# Patient Record
Sex: FEMALE | Race: BLACK OR AFRICAN AMERICAN | NOT HISPANIC OR LATINO | Employment: OTHER | URBAN - METROPOLITAN AREA
[De-identification: names, ages, dates, MRNs, and addresses within clinical notes are randomized per-mention and may not be internally consistent; named-entity substitution may affect disease eponyms.]

---

## 2017-01-10 ENCOUNTER — HISTORICAL (OUTPATIENT)
Dept: ADMINISTRATIVE | Facility: HOSPITAL | Age: 77
End: 2017-01-10

## 2017-07-29 RX ORDER — DIGOXIN 125 MCG
TABLET ORAL
Qty: 90 TABLET | Refills: 3 | Status: SHIPPED | OUTPATIENT
Start: 2017-07-29 | End: 2018-01-15

## 2017-08-22 RX ORDER — AMLODIPINE BESYLATE 10 MG/1
1 TABLET ORAL DAILY
COMMUNITY
Start: 2017-03-02 | End: 2017-12-20 | Stop reason: ALTCHOICE

## 2017-08-22 RX ORDER — CLOPIDOGREL BISULFATE 75 MG/1
1 TABLET ORAL DAILY
COMMUNITY
End: 2018-03-28

## 2017-08-22 RX ORDER — FERROUS SULFATE 325(65) MG
1 TABLET ORAL 2 TIMES DAILY
COMMUNITY
End: 2017-12-20 | Stop reason: ALTCHOICE

## 2017-08-22 RX ORDER — CITALOPRAM 20 MG/1
1 TABLET, FILM COATED ORAL DAILY
COMMUNITY
Start: 2017-03-30 | End: 2018-01-15

## 2017-08-22 RX ORDER — ASPIRIN 81 MG/1
1 TABLET ORAL DAILY
COMMUNITY
End: 2018-01-15

## 2017-08-22 RX ORDER — FUROSEMIDE 20 MG/1
1 TABLET ORAL DAILY
COMMUNITY
Start: 2017-01-04 | End: 2017-12-20

## 2017-08-22 RX ORDER — CALCITRIOL 0.25 UG/1
1 CAPSULE ORAL DAILY
COMMUNITY
Start: 2017-01-04 | End: 2018-01-15

## 2017-08-22 RX ORDER — TERAZOSIN 2 MG/1
1 CAPSULE ORAL DAILY
COMMUNITY
Start: 2017-01-04 | End: 2017-12-20 | Stop reason: ALTCHOICE

## 2017-08-22 RX ORDER — PRAVASTATIN SODIUM 40 MG/1
1 TABLET ORAL DAILY
COMMUNITY
Start: 2017-01-04 | End: 2017-12-20 | Stop reason: ALTCHOICE

## 2017-11-20 ENCOUNTER — HISTORICAL (OUTPATIENT)
Dept: ADMINISTRATIVE | Facility: HOSPITAL | Age: 77
End: 2017-11-20

## 2017-12-01 ENCOUNTER — OUTSIDE PLACE OF SERVICE (OUTPATIENT)
Dept: FAMILY MEDICINE | Facility: CLINIC | Age: 77
End: 2017-12-01
Payer: MEDICARE

## 2017-12-01 PROCEDURE — G0180 MD CERTIFICATION HHA PATIENT: HCPCS | Mod: ,,, | Performed by: INTERNAL MEDICINE

## 2017-12-20 ENCOUNTER — OFFICE VISIT (OUTPATIENT)
Dept: FAMILY MEDICINE | Facility: CLINIC | Age: 77
End: 2017-12-20
Payer: MEDICARE

## 2017-12-20 VITALS
WEIGHT: 113 LBS | SYSTOLIC BLOOD PRESSURE: 115 MMHG | TEMPERATURE: 99 F | HEART RATE: 91 BPM | BODY MASS INDEX: 22.19 KG/M2 | HEIGHT: 60 IN | DIASTOLIC BLOOD PRESSURE: 67 MMHG

## 2017-12-20 DIAGNOSIS — Z99.2 STAGE 5 CHRONIC KIDNEY DISEASE ON CHRONIC DIALYSIS: ICD-10-CM

## 2017-12-20 DIAGNOSIS — R41.0 MENTAL CONFUSION: ICD-10-CM

## 2017-12-20 DIAGNOSIS — N18.6 STAGE 5 CHRONIC KIDNEY DISEASE ON CHRONIC DIALYSIS: ICD-10-CM

## 2017-12-20 DIAGNOSIS — R11.2 NON-INTRACTABLE VOMITING WITH NAUSEA, UNSPECIFIED VOMITING TYPE: Primary | ICD-10-CM

## 2017-12-20 PROBLEM — F34.1 DYSTHYMIA: Status: ACTIVE | Noted: 2017-12-20

## 2017-12-20 PROBLEM — Z78.9 NON-SMOKER: Status: ACTIVE | Noted: 2017-12-20

## 2017-12-20 PROBLEM — I10 BENIGN ESSENTIAL HYPERTENSION: Status: ACTIVE | Noted: 2017-12-20

## 2017-12-20 PROBLEM — M1A.00X0 CHRONIC GOUTY ARTHROPATHY: Status: ACTIVE | Noted: 2017-12-20

## 2017-12-20 PROBLEM — N18.4 CHRONIC KIDNEY DISEASE, STAGE 4 (SEVERE): Status: ACTIVE | Noted: 2017-12-20

## 2017-12-20 PROBLEM — K80.20 CALCULUS OF GALLBLADDER: Status: ACTIVE | Noted: 2017-12-20

## 2017-12-20 PROBLEM — E78.2 MULTIPLE-TYPE HYPERLIPIDEMIA: Status: ACTIVE | Noted: 2017-12-20

## 2017-12-20 PROCEDURE — 99215 OFFICE O/P EST HI 40 MIN: CPT | Mod: ,,, | Performed by: INTERNAL MEDICINE

## 2017-12-20 RX ORDER — ATORVASTATIN CALCIUM 40 MG/1
1 TABLET, FILM COATED ORAL DAILY
COMMUNITY
Start: 2017-12-18 | End: 2018-01-15

## 2017-12-20 RX ORDER — TRAMADOL HYDROCHLORIDE 50 MG/1
1 TABLET ORAL EVERY 6 HOURS PRN
COMMUNITY
Start: 2017-11-29 | End: 2018-01-03 | Stop reason: SDUPTHER

## 2017-12-20 RX ORDER — TETANUS TOXOID, REDUCED DIPHTHERIA TOXOID AND ACELLULAR PERTUSSIS VACCINE, ADSORBED 5; 2.5; 8; 8; 2.5 [IU]/.5ML; [IU]/.5ML; UG/.5ML; UG/.5ML; UG/.5ML
SUSPENSION INTRAMUSCULAR
COMMUNITY
Start: 2017-10-21 | End: 2018-01-15

## 2017-12-20 RX ORDER — ONDANSETRON 4 MG/1
4 TABLET, FILM COATED ORAL EVERY 8 HOURS PRN
Qty: 20 TABLET | Refills: 1 | Status: SHIPPED | OUTPATIENT
Start: 2017-12-20 | End: 2018-01-07 | Stop reason: SDUPTHER

## 2017-12-20 RX ORDER — DOCUSATE SODIUM 100 MG/1
100 CAPSULE, LIQUID FILLED ORAL 2 TIMES DAILY
COMMUNITY
End: 2018-03-28

## 2017-12-20 RX ORDER — ALPRAZOLAM 1 MG/1
1 TABLET ORAL
COMMUNITY
Start: 2017-12-13 | End: 2018-03-28

## 2017-12-20 NOTE — PROGRESS NOTES
Subjective:       Patient ID: Tyra Isaac is a 77 y.o. female.    Chief Complaint: Follow-up ([facility] fall ); Aphasia; Extremity Weakness (gotten bad x3 days ); and Emesis    Son    Raffi     [phone]    Patient is a chronically ill 77-year-old -American female who recently went into acute renal failure which was a progression of her chronic kidney disease. She was started on dialysis.    Patient is brought to my office with her son in attendance with some stuttering of speech and some confusion. She also has some nausea but no arleen vomiting. Patient is unable to give an accurate account of as to what is going on. Son is also not very elevated of course going on but apparently 2 days ago she was all right.    Initially her blood pressure was slightly low on the wrist. It is unclear if patient had any head trauma or injury. There is no history of meningitis or encephalitis. I've taken the opportunity to review her medications extensively and I do not see any blood pressure medications or diuretics.    Patient has acute on chronic kidney disease and has been initiated on dialysis recently. She is also on Procrit injection. She had chronic proteinuria. She also has underlying hypertension, depression and chronic back pain.    On review of her medications, I do see that she is on digoxin. She is not on any antibiotics at this point. She is not on any significant pain medications or opiates except for Ultram. I see some prescription for alprazolam also.      Extremity Weakness    This is a new problem. The current episode started in the past 7 days. There has been no history of extremity trauma. The problem occurs constantly. Pertinent negatives include no fever. The treatment provided no relief.   Emesis    This is a new problem. The current episode started yesterday. The problem occurs intermittently. The problem has been unchanged. There has been no fever. Associated symptoms include dizziness,  myalgias and weight loss. Pertinent negatives include no abdominal pain, arthralgias, chest pain, chills, coughing, diarrhea, fever, headaches or URI.       Past Medical History:   Diagnosis Date    Anxiety     Depression     Disorder of kidney and ureter     Gout     Hyperlipidemia     Hypertension      Social History     Social History    Marital status:      Spouse name: N/A    Number of children: N/A    Years of education: N/A     Occupational History    Not on file.     Social History Main Topics    Smoking status: Never Smoker    Smokeless tobacco: Never Used    Alcohol use No    Drug use: No    Sexual activity: No     Other Topics Concern    Not on file     Social History Narrative    No narrative on file     Past Surgical History:   Procedure Laterality Date    CARDIAC SURGERY      WRIST SURGERY       Family History   Problem Relation Age of Onset    Heart disease Mother     Cancer Father        Review of Systems   Constitutional: Positive for activity change, appetite change, fatigue and weight loss. Negative for chills and fever.   HENT: Positive for voice change (stuttering). Negative for congestion, postnasal drip and sinus pressure.    Eyes: Negative for pain, discharge and visual disturbance.   Respiratory: Negative for cough, chest tightness and shortness of breath.    Cardiovascular: Negative for chest pain, palpitations and leg swelling.   Gastrointestinal: Positive for nausea and vomiting. Negative for abdominal distention, abdominal pain, anal bleeding, constipation and diarrhea.   Genitourinary: Negative for difficulty urinating and dysuria.   Musculoskeletal: Positive for extremity weakness and myalgias. Negative for arthralgias and joint swelling.   Skin: Negative for color change, pallor and rash.   Neurological: Positive for dizziness, speech difficulty, weakness and light-headedness. Negative for tremors, seizures, syncope and headaches.   Hematological: Negative  for adenopathy. Does not bruise/bleed easily.   Psychiatric/Behavioral: Positive for confusion. Negative for agitation and dysphoric mood. The patient is nervous/anxious.        Objective:      Physical Exam   Constitutional: She appears well-developed. She is cooperative. She appears distressed.   Somewhat chronically ill-appearing lady.   HENT:   Head: Normocephalic and atraumatic.   Right Ear: Tympanic membrane normal.   Left Ear: Tympanic membrane normal.   Mouth/Throat: No oropharyngeal exudate.   Eyes: Conjunctivae, EOM and lids are normal. Lids are everted and swept, no foreign bodies found. Right eye exhibits no discharge. Left eye exhibits no discharge. No scleral icterus. Right pupil is round and reactive. Left pupil is round and reactive.   Neck: Trachea normal and normal range of motion. Neck supple. No JVD present. No tracheal deviation present. No thyromegaly present.   Cardiovascular: Normal rate, regular rhythm, S1 normal, S2 normal and normal heart sounds.  Exam reveals no friction rub.    Pulmonary/Chest: Breath sounds normal.   Abdominal: Soft. Bowel sounds are normal. There is no rigidity and no guarding.   Musculoskeletal:   Patient has difficulty standing by herself and has to be assisted.   Lymphadenopathy:     She has no cervical adenopathy.     She has no axillary adenopathy.   Neurological: She is alert.   Skin: Skin is warm and dry.   Psychiatric: Cognition and memory are impaired. She expresses inappropriate judgment.   Nursing note and vitals reviewed.      Assessment:       1. Non-intractable vomiting with nausea, unspecified vomiting type    2. Mental confusion    3. Stage 5 chronic kidney disease on chronic dialysis       patient presents with somewhat confused state with stuttering of speech and episodes of nausea.    On examination of the shunt site I do not see any visible infection and she is afebrile.    Given plethora of multiple issues going on in this sick patient have advised  her to go to the emergency department which she declines. I've advised her son in case there is any further deterioration she should be taken to the emergency department.    I've advised to stop all nonurgent or noncritical medications like Lipitor, citalopram and digoxin until we get the dig levels.     I did take opportunity to speak to Dr. Maynard her nephrologist and apprised of patient's status so that she can be followed up tomorrow during dialysis.     Repeat blood pressures were reasonable on the left arm.       Hopefully she is not drained to much. Hopefully this is probably a viral infection which should subside by tomorrow. Continue to give by mouth fluids within reasonable limits. Zofran for nausea.   Plan:           Non-intractable vomiting with nausea, unspecified vomiting type  -     Digoxin level; Future; Expected date: 12/20/2017  -     ondansetron (ZOFRAN) 4 MG tablet; Take 1 tablet (4 mg total) by mouth every 8 (eight) hours as needed for Nausea.  Dispense: 20 tablet; Refill: 1    Mental confusion  -     Comprehensive metabolic panel; Future; Expected date: 12/20/2017  -     CBC auto differential; Future; Expected date: 12/20/2017    Stage 5 chronic kidney disease on chronic dialysis     prognosis overall prognosis is guarded at this point pending further input and inflammation.    Patient's son has been advised should there be further deterioration of status or persistent nausea and vomiting by evening they should go to the emergency department.    I will check with him tomorrow as to how patient is doing.    Follow-up in one month or earlier as needed.    Spent about 45 minutes with review of present records, hospital records, review of medications and discussion with Dr. Maynard.

## 2017-12-20 NOTE — PATIENT INSTRUCTIONS
Confusion  Confusion is a change in a persons ability to think clearly. There may be trouble recognizing familiar people and places or knowing what day it is. Memory, judgment, and decision-making may also be affected. In severe cases, the person may have limited or no response to being spoken to.  Confusion is usually a sign of an underlying problem. It may occur suddenly. Or it may develop gradually over time. Causes of confusion include brain injury, medicines, alcohol, withdrawal from certain medicines or illegal drugs, and infection. Heart attack and stroke may cause it. Confusion can also be a sign of dementia or a mental illness.  Treatment will depend on the cause of the problem. If the issue is a medicine, stopping the medicine may help. The healthcare provider will perform an evaluation and certain tests may be done. Follow up with the healthcare provider for the results.  Home care  · Be sure someone is with the confused person at all times. He or she should not be left alone or unsupervised.  · Tell the healthcare provider about all medicines that the person takes. These include prescription, over-the-counter, herbs, and supplements.  · Dehydration can increase confusion. Ask the healthcare provider how much fluid the person should be drinking. Offer liquids and ensure that they are taken.  · Keep all medicines in a secure place under the caregivers control. To prevent overdose, a confused person should take medicines only under the supervision of a caregiver.  · To help a person with confusion:  ¨ Establish a daily routine. Change can be a source of stress for someone with confusion. Make and keep a time schedule for common tasks such as bathing, dressing, taking medicines, meals, going for walks, shopping, naps and bed time.  ¨ Speak slowly and clearly with a gentle tone of voice. Use short simple words and sentences. Ask one question at a time. Do not interrupt, criticize or argue. Be calm and  supportive. Use friendly facial expressions. Use pointing and touching to help communicate. If there has been loss of long-term memory, do not ask questions about past events. This would only cause frustration for the person.  ¨ Use lists, signs, family photos, clocks and calendars as memory aids. Label cabinets and drawers. Try to distract, not confront, the patient. When he/she becomes frustrated or upset, redirect his/her attention to eating or some other activity of interest.  ¨ If this proves to be due to a permanent condition, talk to the healthcare provider or a  about getting a Power of  for healthcare and for financial decisions. It is best to do this while the person can still sign legal documents and make his or her own decisions. Otherwise, a court order will be required.  Follow-up care  Follow up with the person's healthcare provider or as advised for further testing or changes in medical care.  When to seek medical advice  Call the healthcare provider for any of the following:  · Frequent falling  · Refusal to eat or drink  · Violent behavior or behavior too difficult to manage at home  · New hallucinations or delusions  · Increased drowsiness  · Complaints of headache or numbness or weakness of the face, arm or leg  · Nausea or vomiting  · Slurred speech or trouble speaking, walking, or seeing  · Fainting spell, dizziness or seizure  · Unexplained fever over 100.4º F (38.0º C) or as directed by the healthcare provider  Date Last Reviewed: 8/23/2015  © 2852-6205 [employer]. [address] [place] [address], [place] [address]. All rights reserved. This information is not intended as a substitute for professional medical care. Always follow your healthcare professional's instructions.

## 2018-01-03 DIAGNOSIS — M25.539 PAIN IN WRIST, UNSPECIFIED LATERALITY: Primary | ICD-10-CM

## 2018-01-03 RX ORDER — TRAMADOL HYDROCHLORIDE 50 MG/1
50 TABLET ORAL EVERY 8 HOURS PRN
Qty: 30 TABLET | Refills: 1 | Status: SHIPPED | OUTPATIENT
Start: 2018-01-03 | End: 2018-01-15 | Stop reason: SINTOL

## 2018-01-07 DIAGNOSIS — R11.2 NON-INTRACTABLE VOMITING WITH NAUSEA, UNSPECIFIED VOMITING TYPE: ICD-10-CM

## 2018-01-07 RX ORDER — ONDANSETRON 4 MG/1
TABLET, FILM COATED ORAL
Qty: 20 TABLET | Refills: 1 | Status: SHIPPED | OUTPATIENT
Start: 2018-01-07 | End: 2018-03-28

## 2018-01-15 ENCOUNTER — OFFICE VISIT (OUTPATIENT)
Dept: FAMILY MEDICINE | Facility: CLINIC | Age: 78
End: 2018-01-15
Payer: MEDICARE

## 2018-01-15 VITALS
HEART RATE: 99 BPM | SYSTOLIC BLOOD PRESSURE: 108 MMHG | BODY MASS INDEX: 21.99 KG/M2 | HEIGHT: 60 IN | DIASTOLIC BLOOD PRESSURE: 56 MMHG | WEIGHT: 112 LBS

## 2018-01-15 DIAGNOSIS — I12.0: ICD-10-CM

## 2018-01-15 DIAGNOSIS — I48.0 PAROXYSMAL ATRIAL FIBRILLATION: ICD-10-CM

## 2018-01-15 DIAGNOSIS — E78.2 MULTIPLE-TYPE HYPERLIPIDEMIA: ICD-10-CM

## 2018-01-15 DIAGNOSIS — I10 BENIGN ESSENTIAL HYPERTENSION: Primary | ICD-10-CM

## 2018-01-15 PROCEDURE — 99214 OFFICE O/P EST MOD 30 MIN: CPT | Mod: ,,, | Performed by: INTERNAL MEDICINE

## 2018-01-15 RX ORDER — HYDROCODONE BITARTRATE AND ACETAMINOPHEN 5; 325 MG/1; MG/1
1 TABLET ORAL 2 TIMES DAILY PRN
COMMUNITY
Start: 2018-01-12 | End: 2018-01-29 | Stop reason: ALTCHOICE

## 2018-01-15 RX ORDER — ATORVASTATIN CALCIUM 40 MG/1
20 TABLET, FILM COATED ORAL NIGHTLY
Qty: 45 TABLET | Refills: 1 | Status: SHIPPED | OUTPATIENT
Start: 2018-01-15 | End: 2018-01-29

## 2018-01-15 NOTE — PATIENT INSTRUCTIONS
Diet for Chronic Kidney Disease  Following a special diet when you have kidney disease can help you stay as healthy as possible. Your healthcare provider or dietitian should make a special diet plan just for you.    Eating right  Here are some good eating rules to follow:  · Protein. Eating protein is important for your body. But too much protein can put a strain on your kidneys. Eating less protein may slow the progression of chronic kidney disease. Foods high in protein include meat, fish, eggs, cheese, and other dairy products. A registered dietitian can help you plan a diet that has the right amount of protein for you.  · Sodium. Having too much salt in your diet can make your body hold onto (retain) water. Ask your provider or dietitian how much sodium per day you are allowed. This will help you avoid fluid buildup in your body (fluid retention). It can also help control high blood pressure. Learn to read food labels to know how much sodium is in one serving. Foods high in salt include processed meats, canned and boxed foods, sauces, salted chips and snacks, pickled foods, frozen dinners, and restaurant and fast food.  · Fluids. If you have advanced kidney disease, you will need to limit the water and fluids you drink. If you dont, then too much water will build up in your body. The exact amount of fluid you can drink depends on how well your kidneys are working. Ask your provider how much water you can safely drink each day.  · Potassium. In advanced kidney disease, your potassium level can go dangerously high. This affects your heart. It can cause an irregular heartbeat (arrhythmia). Ask your provider or dietitian if you should limit potassium in your diet. Foods high in potassium include dairy products (milk, yogurt, cheese), dried beans, bananas, oranges, potatoes, tomatoes, spinach, cantaloupe, honeydew melon, dried fruits, and nuts.   · Calcium. Calcium is important to build strong bones. But foods  high in calcium are also high in phosphorus, which can take calcium from your bones. Limiting foods high in phosphorus will help keep calcium in your bones. Ask your provider how much calcium you should get each day.  · Phosphorus. In advanced kidney disease, your phosphorus level can go dangerously high. This affects many systems in the body and can damage your heart. Limit your intake of phosphorus-rich foods. These include dried beans and peas, nuts, peanut butter, cocoa, beer, cola drinks, and dairy products.  Date Last Reviewed: 8/1/2016  © 9597-0584 [employer]. [address] [place] [address], [place] [address]. All rights reserved. This information is not intended as a substitute for professional medical care. Always follow your healthcare professional's instructions.

## 2018-01-15 NOTE — PROGRESS NOTES
"Subjective:       Patient ID: Tyra Isaac is a 77 y.o. female.    Chief Complaint: Follow-up (on meds removal ); Chronic Kidney Disease; and Hyperlipidemia    Pt comes for follow up. Chronic kidney disease End stage recently started on hemodialysis. Not tolerating her dialysis well with frustration, anger and possibly low blood pressure which curtails her dialysis duration to about 60% of time. "The big needle scares me" and "they don't allow me to move my butt crack even a few inches".    Accompanied with son. Recent hospitalization for altered mental status and delirium  Noted with dehydration and viral syndrome.     Pt today more cognizant  and coherent though intermittent asterixis. Perhaps her son in attendance empowers her more to complain.    Also H/O A fib paroxysmal <not on anticoagulation at this point.      Hyperlipidemia   This is a chronic problem. The current episode started more than 1 year ago. The problem is controlled. Exacerbating diseases include chronic renal disease. She has no history of obesity. Associated symptoms include myalgias. Pertinent negatives include no chest pain or shortness of breath. Current antihyperlipidemic treatment includes statins (was off transiently due to delirium).       Past Medical History:   Diagnosis Date    Anxiety     Depression     Disorder of kidney and ureter     Gout     Hyperlipidemia     Hypertension      Social History     Social History    Marital status:      Spouse name: N/A    Number of children: N/A    Years of education: N/A     Occupational History    Not on file.     Social History Main Topics    Smoking status: Never Smoker    Smokeless tobacco: Never Used    Alcohol use No    Drug use: No    Sexual activity: No     Other Topics Concern    Not on file     Social History Narrative    No narrative on file     Past Surgical History:   Procedure Laterality Date    CARDIAC SURGERY      WRIST SURGERY       Family " History   Problem Relation Age of Onset    Heart disease Mother     Cancer Father        Review of Systems   Constitutional: Positive for activity change, appetite change and fatigue. Negative for chills and fever.   HENT: Positive for voice change (stuttering). Negative for congestion, postnasal drip and sinus pressure.    Eyes: Negative for pain, discharge and visual disturbance.   Respiratory: Negative for cough, chest tightness and shortness of breath.    Cardiovascular: Negative for chest pain, palpitations and leg swelling.   Gastrointestinal: Positive for nausea and vomiting. Negative for abdominal distention, abdominal pain, anal bleeding, constipation and diarrhea.   Genitourinary: Negative for difficulty urinating and dysuria.   Musculoskeletal: Positive for myalgias. Negative for arthralgias and joint swelling.   Skin: Negative for color change, pallor and rash.   Neurological: Positive for dizziness, speech difficulty, weakness and light-headedness. Negative for tremors, seizures, syncope and headaches.   Hematological: Negative for adenopathy. Does not bruise/bleed easily.   Psychiatric/Behavioral: Positive for confusion. Negative for agitation and dysphoric mood. The patient is nervous/anxious.        Objective:       Vitals:    01/15/18 1329   BP: (!) 108/56   Pulse: 99   Weight: 50.8 kg (112 lb)   Height: 5' (1.524 m)     Physical Exam   Constitutional: She is cooperative. She appears distressed.   Somewhat chronically ill-appearing lady.   HENT:   Head: Normocephalic and atraumatic.   Mouth/Throat: No oropharyngeal exudate.   Eyes: Conjunctivae, EOM and lids are normal. Lids are everted and swept, no foreign bodies found. Right eye exhibits no discharge. Left eye exhibits no discharge. No scleral icterus. Right pupil is round and reactive. Left pupil is round and reactive.   Neck: Trachea normal and normal range of motion. Neck supple. No JVD present. No tracheal deviation present. No thyromegaly  present.   Cardiovascular: S1 normal and S2 normal.  Tachycardia present.  Exam reveals no friction rub.    Pulmonary/Chest: Breath sounds normal.   Abdominal: Soft. Bowel sounds are normal. There is no rigidity and no guarding.   Musculoskeletal:   Patient has difficulty standing by herself and has to be assisted.   Lymphadenopathy:     She has no cervical adenopathy.   Neurological: She is alert.   Skin: Skin is warm and dry.   Psychiatric: Her mood appears anxious. Her affect is labile. She is not actively hallucinating. She expresses inappropriate judgment.   Nursing note and vitals reviewed.      Assessment:       1. Benign essential hypertension    2. Multiple-type hyperlipidemia    3. 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  Dispense: 45 tablet; Refill: 1    Pt general status   is gradually stabilizing    Overall outlook guarded.  Continues to have pain intermittently in hands and wrists.    A Fib - to keep cardio evaluation and will resume digoxin at low dose . Discuss with Dr Bonner    May be at risk for warfarin till sure about compliance and fall risk    Mentation gradually better though some depression possible.

## 2018-01-17 PROBLEM — I12.0: Chronic | Status: ACTIVE | Noted: 2017-06-17

## 2018-01-17 PROBLEM — F33.1 MODERATE EPISODE OF RECURRENT MAJOR DEPRESSIVE DISORDER: Status: ACTIVE | Noted: 2018-01-17

## 2018-01-17 PROBLEM — N18.6 END STAGE KIDNEY DISEASE: Chronic | Status: ACTIVE | Noted: 2017-06-17

## 2018-01-17 PROBLEM — G93.40 ENCEPHALOPATHY ACUTE: Status: ACTIVE | Noted: 2018-01-01

## 2018-01-29 ENCOUNTER — OFFICE VISIT (OUTPATIENT)
Dept: FAMILY MEDICINE | Facility: CLINIC | Age: 78
End: 2018-01-29
Payer: MEDICARE

## 2018-01-29 VITALS
HEIGHT: 60 IN | WEIGHT: 114 LBS | DIASTOLIC BLOOD PRESSURE: 61 MMHG | HEART RATE: 88 BPM | SYSTOLIC BLOOD PRESSURE: 114 MMHG | BODY MASS INDEX: 22.38 KG/M2

## 2018-01-29 DIAGNOSIS — I10 BENIGN ESSENTIAL HYPERTENSION: Primary | ICD-10-CM

## 2018-01-29 DIAGNOSIS — K59.00 CONSTIPATION, UNSPECIFIED CONSTIPATION TYPE: ICD-10-CM

## 2018-01-29 DIAGNOSIS — E78.2 MULTIPLE-TYPE HYPERLIPIDEMIA: ICD-10-CM

## 2018-01-29 PROCEDURE — 99213 OFFICE O/P EST LOW 20 MIN: CPT | Mod: ,,, | Performed by: INTERNAL MEDICINE

## 2018-01-29 RX ORDER — ATORVASTATIN CALCIUM 40 MG/1
40 TABLET, FILM COATED ORAL DAILY
COMMUNITY

## 2018-01-29 RX ORDER — TRAMADOL HYDROCHLORIDE 50 MG/1
1 TABLET ORAL DAILY PRN
COMMUNITY
Start: 2018-01-23 | End: 2018-03-28

## 2018-01-29 NOTE — PATIENT INSTRUCTIONS
Constipation (Adult)  Constipation means that you have bowel movements that are less frequent than usual. Stools often become very hard and difficult to pass.  Constipation is very common. At some point in life it affects almost everyone. Since everyone's bowel habits are different, what is constipation to one person may not be to another. Your healthcare provider may do tests to diagnose constipation. It depends on what he or she finds when evaluating you.    Symptoms of constipation include:  · Abdominal pain  · Bloating  · Vomiting  · Painful bowel movements  · Itching, swelling, bleeding, or pain around the anus  Causes  Constipation can have many causes. These include:  · Diet low in fiber  · Too much dairy  · Not drinking enough liquids  · Lack of exercise or physical activity. This is especially true for older adults.  · Changes in lifestyle or daily routine, including pregnancy, aging, work, and travel  · Frequent use or misuse of laxatives  · Ignoring the urge to have a bowel movement or delaying it until later  · Medicines, such as certain prescription pain medicines, iron supplements, antacids, certain antidepressants, and calcium supplements  · Diseases like irritable bowel syndrome, bowel obstructions, stroke, diabetes, thyroid disease, Parkinson disease, hemorrhoids, and colon cancer  Complications  Potential complications of constipation can include:  · Hemorrhoids  · Rectal bleeding from hemorrhoids or anal fissures (skin tears)  · Hernias  · Dependency on laxatives  · Chronic constipation  · Fecal impaction  · Bowel obstruction or perforation  Home care  All treatment should be done after talking with your healthcare provider. This is especially true if you have another medical problems, are taking prescription medicines, or are an older adult. Treatment most often involves lifestyle changes. You may also need medicines. Your healthcare provider will tell you which will work best for you. Follow  the advice below to help avoid this problem in the future.  Lifestyle changes  These lifestyle changes can help prevent constipation:  · Diet. Eat a high-fiber diet, with fresh fruit and vegetables, and reduce dairy intake, meats, and processed foods  · Fluids. It's important to get enough fluids each day. Drink plenty of water when you eat more fiber. If you are on diet that limits the amount of fluid you can have, talk about this with your healthcare provider.  · Regular exercise. Check with your healthcare provider first.  Medications  Take any medicines as directed. Some laxatives are safe to use only every now and then. Others can be taken on a regular basis. Talk with your doctor or pharmacist if you have questions.  Prescription pain medicines can cause constipation. If you are taking this kind of medicine, ask your healthcare provider if you should also take a stool softener.  Medicines you may take to treat constipation include:  · Fiber supplements  · Stool softeners  · Laxatives  · Enemas  · Rectal suppositories  Follow-up care  Follow up with your healthcare provider if symptoms don't get better in the next few days. You may need to have more tests or see a specialist.  Call 911  Call 911 if any of these occur:  · Trouble breathing  · Stiff, rigid abdomen that is severely painful to touch  · Confusion  · Fainting or loss of consciousness  · Rapid heart rate  · Chest pain  When to seek medical advice  Call your healthcare provider right away if any of these occur:  · Fever over 100.4°F (38°C)  · Failure to resume normal bowel movements  · Pain in your abdomen or back gets worse  · Nausea or vomiting  · Swelling in your abdomen  · Blood in the stool  · Black, tarry stool  · Involuntary weight loss  · Weakness  Date Last Reviewed: 12/30/2015  © 9393-3256 [employer]. [address] [place] [address], [place] [address]. All rights reserved. This information is not intended as a substitute for  professional medical care. Always follow your healthcare professional's instructions.

## 2018-01-29 NOTE — PROGRESS NOTES
Subjective:       Patient ID: Tyra Isaac is a 77 y.o. female.    Chief Complaint: Hypertension; Hyperlipidemia; and Constipation    Patient is a 77-year-old female who comes for follow-up. She has chronic kidney disease and is going to hemodialysis 3 times a week on Tuesdays, Thursdays and Saturdays. On the day of dialysis she does feel drained. But, gradually and slowly she is getting used to dialysis.          Hypertension   This is a chronic problem. The current episode started more than 1 year ago. The problem is controlled. Pertinent negatives include no anxiety, chest pain, headaches, palpitations or shortness of breath. Past treatments include nothing.   Hyperlipidemia   This is a chronic problem. The current episode started more than 1 year ago. Associated symptoms include myalgias. Pertinent negatives include no chest pain or shortness of breath. Current antihyperlipidemic treatment includes statins. Risk factors for coronary artery disease include a sedentary lifestyle.   Constipation   This is a chronic problem. The current episode started more than 1 month ago. The problem has been waxing and waning since onset. Her stool frequency is 2 to 3 times per week. The patient is not on a high fiber diet. She does not exercise regularly. There has not been adequate water intake. Pertinent negatives include no abdominal pain, diarrhea, difficulty urinating, fecal incontinence, fever, nausea or vomiting. Risk factors include stress.       Past Medical History:   Diagnosis Date    Anxiety     Depression     Disorder of kidney and ureter     Encephalopathy acute 1/1/2018    End stage kidney disease 6/17/2017    Gout     Hyperlipidemia     Hypertension     Moderate episode of recurrent major depressive disorder 1/17/2018    Nephropathy hypertensive, stage 5 chronic kidney disease or end stage renal disease 6/17/2017     Social History     Social History    Marital status:      Spouse name:  N/A    Number of children: N/A    Years of education: N/A     Occupational History    Not on file.     Social History Main Topics    Smoking status: Never Smoker    Smokeless tobacco: Never Used    Alcohol use No    Drug use: No    Sexual activity: No     Other Topics Concern    Not on file     Social History Narrative    No narrative on file     Past Surgical History:   Procedure Laterality Date    CARDIAC SURGERY      WRIST SURGERY       Family History   Problem Relation Age of Onset    Heart disease Mother     Cancer Father        Review of Systems   Constitutional: Positive for activity change, appetite change and fatigue. Negative for chills and fever.   HENT: Negative for congestion, postnasal drip, sinus pressure and voice change (stuttering).    Eyes: Negative for pain, discharge and visual disturbance.   Respiratory: Negative for cough, chest tightness and shortness of breath.    Cardiovascular: Negative for chest pain, palpitations and leg swelling.   Gastrointestinal: Negative for abdominal distention, abdominal pain, anal bleeding, constipation, diarrhea, nausea and vomiting.   Genitourinary: Negative for difficulty urinating and dysuria.   Musculoskeletal: Positive for myalgias. Negative for arthralgias and joint swelling.   Skin: Negative for color change, pallor and rash.   Neurological: Negative for tremors, seizures, syncope and headaches.   Psychiatric/Behavioral: Negative for agitation, confusion and dysphoric mood. The patient is nervous/anxious.        Objective:       Vitals:    01/29/18 1454   BP: 114/61   Pulse: 88   Weight: 51.7 kg (114 lb)   Height: 5' (1.524 m)     Physical Exam   Constitutional: She is cooperative. She appears distressed.   Somewhat chronically ill-appearing lady.   HENT:   Head: Normocephalic and atraumatic.   Mouth/Throat: No oropharyngeal exudate.   Eyes: Conjunctivae, EOM and lids are normal. Lids are everted and swept, no foreign bodies found. Right eye  exhibits no discharge. Left eye exhibits no discharge. No scleral icterus. Right pupil is round and reactive. Left pupil is round and reactive.   Neck: Trachea normal and normal range of motion. Neck supple. No JVD present. No tracheal deviation present. No thyromegaly present.   Cardiovascular: Normal rate, S1 normal and S2 normal.  Exam reveals no friction rub.    Pulmonary/Chest: Breath sounds normal.   Abdominal: Soft. Bowel sounds are normal. There is no rigidity and no guarding.   Musculoskeletal:   Patient has difficulty standing by herself and has to be assisted.   Lymphadenopathy:     She has no cervical adenopathy.   Neurological: She is alert.   Psychiatric: Her mood appears anxious. Her affect is not labile. She is not actively hallucinating. She does not express inappropriate judgment.   Patient has a more affable affect today.   Nursing note and vitals reviewed.      Assessment:       1. Benign essential hypertension    2. Multiple-type hyperlipidemia    3. Constipation, unspecified constipation type         Plan:           Benign essential hypertension    Multiple-type hyperlipidemia    Constipation, unspecified constipation type    Patient's blood pressures are stable. She is going through dialysis. I've advised her to take some stool softener or gentle laxative up to 3 times a day on the nondialysis days. Hopefully if this eases her bowel movement, her general happiness will and sense of well-being will increase.    She continues to have pain in the left hand and arm after the surgery and it is hoped that as time passes by it should get better. She'll keep her follow-up with the orthopedic to discuss this neuralgia type pain.    Otherwise I'll see her back in 2-3 months time for follow-up and take it from there.

## 2018-03-01 ENCOUNTER — TELEPHONE (OUTPATIENT)
Dept: FAMILY MEDICINE | Facility: CLINIC | Age: 78
End: 2018-03-01

## 2018-03-01 DIAGNOSIS — Z78.0 ASYMPTOMATIC MENOPAUSE: Primary | ICD-10-CM

## 2018-03-11 PROBLEM — M85.89 OSTEOPENIA OF MULTIPLE SITES: Status: ACTIVE | Noted: 2018-03-09

## 2018-03-28 ENCOUNTER — OFFICE VISIT (OUTPATIENT)
Dept: FAMILY MEDICINE | Facility: CLINIC | Age: 78
End: 2018-03-28
Payer: MEDICARE

## 2018-03-28 VITALS
SYSTOLIC BLOOD PRESSURE: 113 MMHG | WEIGHT: 114 LBS | BODY MASS INDEX: 22.38 KG/M2 | HEIGHT: 60 IN | DIASTOLIC BLOOD PRESSURE: 58 MMHG | HEART RATE: 69 BPM

## 2018-03-28 DIAGNOSIS — I12.0: ICD-10-CM

## 2018-03-28 DIAGNOSIS — I48.0 PAROXYSMAL ATRIAL FIBRILLATION: ICD-10-CM

## 2018-03-28 DIAGNOSIS — E78.2 MULTIPLE-TYPE HYPERLIPIDEMIA: Primary | ICD-10-CM

## 2018-03-28 PROCEDURE — 99214 OFFICE O/P EST MOD 30 MIN: CPT | Mod: ,,, | Performed by: INTERNAL MEDICINE

## 2018-03-28 RX ORDER — WARFARIN 3 MG/1
1.5 TABLET ORAL
Status: ON HOLD | COMMUNITY
Start: 2018-03-21 | End: 2021-06-26 | Stop reason: HOSPADM

## 2018-03-28 RX ORDER — LATANOPROST 50 UG/ML
1 SOLUTION/ DROPS OPHTHALMIC NIGHTLY
COMMUNITY
Start: 2018-01-15 | End: 2023-06-28

## 2018-03-28 RX ORDER — DORZOLAMIDE HYDROCHLORIDE AND TIMOLOL MALEATE 20; 5 MG/ML; MG/ML
1 SOLUTION/ DROPS OPHTHALMIC 2 TIMES DAILY
COMMUNITY
Start: 2018-02-06 | End: 2023-06-28

## 2018-03-28 NOTE — PATIENT INSTRUCTIONS
Chronic Kidney Disease (CKD)     The role of the kidneys is to remove waste products and extra water from the blood.  When the kidneys do not work as they should, waste products begin to build up in the blood. This is called chronic kidney disease (CKD). CKD means that you have kidney damage or a decrease in kidney function lasting at least 3 months. CKD allows extra water, waste, and toxins to build up in the body. This can eventually become life-threatening. You might need dialysis or a kidney transplant to stay alive. This most severe form is called end stage renal disease.  Diabetes is the leading causes of chronic renal failure. Other causes include high blood pressure, hardening of the arteries (atherosclerosis), lupus, inflammation of the blood vessels (vasculitis), and past viral or bacterial infections. Certain over-the-counter pain medicines can cause renal failure when taken often over a long period of time. These include aspirin, ibuprofen, and related anti-inflammatory medicines called NSAIDs (nonsteroidal anti-inflammatory drugs).  Home care  The following guidelines will help you care for yourself at home:  · If you have diabetes, talk with your healthcare provider about keeping your blood sugar under control. Ask if you need to make and changes to your diet, lifestyle, or medicines.  · If you have high blood pressure:  ¨ Take prescribed medicine to lower your blood pressure to the recommended goal of less than 130/80.  ¨ Start a regular exercise program that you enjoy. Check with your healthcare provider to be sure your planned exercise program is right for you.  ¨ Eat less salt (sodium). Your healthcare provider can tell you how much salt per day is safe for you.  · If you are overweight, talk with your healthcare provider about a weight loss plan.  · If you smoke, you must quit. Smoking makes kidney disease worse. Talk with your healthcare provider about ways to help you quit.  For more  information, visit the following links:  ¨ www.smokefree.gov/sites/default/files/pdf/clearing-the-air-accessible.pdf  ¨ www.smokefree.gov  ¨ www.cancer.org/healthy/stayawayfromtobacco/guidetoquittingsmoking/  · Most people with CKD need to follow a special diet.  Be sure you understand yours. In general, you will need to limit protein, salt, potassium, and phosphorus. You also need to limit how much fluid you drink.   · CKD is a risk factor for heart disease. Talk with your healthcare provider about any other risk factors you might have and what you can do to lessen them.  · Talk with your healthcare provider about any medicines you are taking to find out if they need to be reduced or stopped.  · Don't use the following over-the-counter medicines, or consult your healthcare provider before using:  ¨ Aspirin and NSAIDs such as ibuprofen or naproxen. Using acetaminophen for fever or pain is OK.  ¨ Laxatives and antacids containing magnesium or aluminum  ¨ Fleet or phospho soda enemas containing phosphorus  ¨ Certain stomach acid-blocking medicine such as cimetidine or ranitidine   ¨ Decongestants containing pseudoephedrine   ¨ Herbal supplements  Follow-up care  Follow up with your healthcare provider, or as advised. Contact one of the following for more information:  · American Association of Kidney Patients 693-284-5798 www.aakp.org  · National Kidney Foundation 458-986-4848 www.kidney.org  · American Kidney Fund 076-379-6431 www.kidneyfund.org  · National Kidney Disease Education Program 866-4KIDNEY www.nkdep.nih.gov  If an X-ray, ECG (cardiogram), or other diagnostic test was taken, you will be told of any new findings that may affect your care.  Call 911  Call 911 if you have any of the following:  · Severe weakness, dizziness, fainting, drowsiness, or confusion  · Chest pain or shortness of breath  · Heart beating fast, slow, or irregularly  When to seek medical advice  Call your healthcare provider right away  if any of these occur:  · Nausea or vomiting  · Fever of 100.4°F (38°C) or higher, or as directed by your healthcare provider  · Unexpected weight gain or swelling in the legs, ankles, or around the eyes  · Decrease or absent urine output  Date Last Reviewed: 9/1/2016  © 2889-8862 [employer]. [address] [place] [address]. All rights reserved. This information is not intended as a substitute for professional medical care. Always follow your healthcare professional's instructions.        Atrial Fibrillation    Atrial fibrillation is a condition in which the heart beats in an irregular pattern. It is caused by a problem in the heart's electrical pathways. It can be a sign of heart disease or other health problems that affect the heart.  Heart palpitations are the most common symptom of atrial fibrillation. This is the feeling that your heart is fluttering, beating fast, hard, or irregular. When the heart beats too fast, it doesn't pump blood very well. This can cause other symptoms like anxiety, fatigue, shortness of breath, chest pain, dizziness, or fainting. Atrial fibrillation may come and go. It can last from a few hours to a couple of days. Or, it may become chronic, lasting for months at a time or even become permanent.  Atrial fibrillation may be caused by heart disease or other conditions in the body that affect the heart:  · Coronary artery disease (atherosclerosis)  · High blood pressure  · Disease of the heart valves  · Enlarged heart  · Heart failure  Atrial fibrillation can also occur without heart disease because of:  · Overactive thyroid (hyperthyroid)  · Chronic lung disease (COPD, emphysema, bronchitis)  · Heavy alcohol use  · Cardiac stimulants like cocaine, amphetamines, diet pills, certain decongestant cold medicines, caffeine, or nicotine  · Infection  · Blood clot in the lung (pulmonary embolus)  · Diabetes  · Chronic kidney disease  · Obesity  · Extreme athletic  conditioning  Treating or removing these causes will help your treatment for atrial fibrillation. It will also make it less likely for the atrial fibrillation to come back.  Atrial fibrillation can alternate back and forth with another abnormal rhythm called atrial flutter. Atrial flutter is a more regular heart rhythm and is also associated with an increased stroke risk. Proper treatment can lower your risk for stroke.  Home care  Follow these guidelines when caring for yourself at home:  · Go back to your usual activities as soon as you are feeling back to normal.  · If you smoke, stop smoking. Contact your healthcare provider or a local stop-smoking program for help.  · Don't use stimulants like alcohol, cocaine, amphetamines, diet pills, certain decongestant cold medicines, caffeine, or nicotine.  · If your provider prescribed medicine to stop atrial fibrillation from coming back, take it exactly as directed. Some medicines must be taken every day, not just when you have symptoms. This will help them work as they should.  · If you were prescribed warfarin to lower your risk for stroke, have your blood tested on a regular basis as advised by your provider. This will make sure you are getting the dose that is right for you. It also lower your risk for side effects.  Follow-up care  Follow up with your healthcare provider, or as advised.  When to seek medical advice  Call your healthcare provider right away if any of these following occur:  · Shortness of breath or swelling in the legs gets worse  · Unexpected weight gain  · Chest pain or the sense that your heart is fluttering or beating fast or hard (palpitations)  · Any sign of bleeding if you are on a blood thinner   · Pain, redness, or swelling in one leg  Also call your provider right away if you have these signs of stroke:  · Weakness of an arm or leg or one side of the face  · Difficulty with speech or vision  · Extreme drowsiness, confusion, dizziness, or  fainting  Date Last Reviewed: 5/1/2016  © 9610-9689 [employer]. [address] [place] [address], [place] [address]. All rights reserved. This information is not intended as a substitute for professional medical care. Always follow your healthcare professional's instructions.

## 2018-03-28 NOTE — PROGRESS NOTES
Subjective:       Patient ID: Tyra Isaac is a 77 y.o. female.    Chief Complaint: Chronic Kidney Disease; Gout; and Hyperlipidemia    Ms. Yadi Isaac is a pleasant 77-year-old -American female who comes for follow-up. She has gone on dialysis since about a year or so with end-stage kidney disease. She had a phase in the interim with some psychosis and infections. Gradually she is turning around. She gets dialysis 3 days a week. She has a working shunt in the right arm.  She used to be hypertensive in past but after dialysis her blood pressures have stabilized. She used to be on amlodipine, furosemide terazosin in past.    Currently she is on warfarin for paroxysmal atrial fibrillation. Plavix has been discontinued.    She had seen Dr. matthew recently from cardiology department who is satisfied with her progress. She continues on warfarin with monitoring as per protocols. No recent falls.        Hyperlipidemia   This is a chronic problem. The current episode started more than 1 year ago. The problem is controlled. She has no history of hypothyroidism or obesity. Pertinent negatives include no chest pain, myalgias or shortness of breath. Current antihyperlipidemic treatment includes statins. The current treatment provides moderate improvement of lipids. Compliance problems include psychosocial issues.  Risk factors for coronary artery disease include post-menopausal and a sedentary lifestyle.   Atrial Fibrillation   Presents for follow-up visit. Symptoms include hypotension. Symptoms are negative for bradycardia, chest pain, hypertension, palpitations and shortness of breath. The symptoms have been stable. Past medical history includes atrial fibrillation and hyperlipidemia. Medication compliance problems include psychosocial issues.       Past Medical History:   Diagnosis Date    Anxiety     Depression     Disorder of kidney and ureter     Encephalopathy acute 1/1/2018    End stage kidney  disease 6/17/2017    Gout     Hyperlipidemia     Hypertension     Moderate episode of recurrent major depressive disorder 1/17/2018    Nephropathy hypertensive, stage 5 chronic kidney disease or end stage renal disease 6/17/2017    Osteopenia of multiple sites 3/9/2018    Based upon bone density measurements. Patient also has chronic kidney disease.     Social History     Social History    Marital status:      Spouse name: N/A    Number of children: N/A    Years of education: N/A     Occupational History    Not on file.     Social History Main Topics    Smoking status: Never Smoker    Smokeless tobacco: Never Used    Alcohol use No    Drug use: No    Sexual activity: No     Other Topics Concern    Not on file     Social History Narrative    No narrative on file     Past Surgical History:   Procedure Laterality Date    CARDIAC SURGERY      WRIST SURGERY       Family History   Problem Relation Age of Onset    Heart disease Mother     Cancer Father        Review of Systems   Constitutional: Positive for activity change (better), appetite change (improving.) and fatigue (Improving). Negative for chills and fever. Unexpected weight change: lost 20-25 pounds post dialysis.   HENT: Negative for congestion, postnasal drip, sinus pressure and voice change (stuttering).    Eyes: Negative for pain, discharge and visual disturbance.   Respiratory: Negative for cough, chest tightness and shortness of breath.    Cardiovascular: Negative for chest pain, palpitations and leg swelling.   Gastrointestinal: Negative for abdominal distention, abdominal pain, anal bleeding, constipation, diarrhea, nausea and vomiting.   Endocrine: Negative for polydipsia and polyphagia.   Genitourinary: Negative for difficulty urinating and dysuria.        Patient does make some urine spontaneously.   Musculoskeletal: Negative for arthralgias, joint swelling and myalgias.   Skin: Negative for color change, pallor and rash.    Neurological: Negative for tremors, seizures, syncope and headaches.   Psychiatric/Behavioral: Negative for agitation, confusion and dysphoric mood. The patient is not nervous/anxious.        Objective:       Vitals:    03/28/18 1118   BP: (!) 113/58   Pulse: 69   Weight: 51.7 kg (114 lb)   Height: 5' (1.524 m)     Physical Exam   Constitutional: She is cooperative. She appears distressed.   Somewhat chronically ill-appearing lady.   HENT:   Head: Normocephalic and atraumatic.   Mouth/Throat: No oropharyngeal exudate.   Eyes: Conjunctivae, EOM and lids are normal. Lids are everted and swept, no foreign bodies found. Right eye exhibits no discharge. Left eye exhibits no discharge. No scleral icterus. Right pupil is round and reactive. Left pupil is round and reactive.   Neck: Trachea normal and normal range of motion. Neck supple. No JVD present. No tracheal deviation present. No thyromegaly present.   Cardiovascular: Normal rate, regular rhythm, S1 normal and S2 normal.  Exam reveals no friction rub.    Murmur heard.   Systolic murmur is present with a grade of 2/6   Pulmonary/Chest: Breath sounds normal.   Abdominal: Soft. Bowel sounds are normal. There is no rigidity and no guarding.   Musculoskeletal:   Patient has difficulty standing by herself and has to be assisted.   Lymphadenopathy:     She has no cervical adenopathy.   Neurological: She is alert. Coordination normal.   Patient was made to ambulate and she could step on a 1 step stool and stepdown without any wobbling or risk for falls. Her ambulation is somewhat slow and cautious with no sway or abnormality in coordination.   Psychiatric: Her mood appears anxious. Her affect is not labile. She is not actively hallucinating. She does not express inappropriate judgment.   Patient has a more affable affect today.   Nursing note and vitals reviewed.      Assessment:       1. Multiple-type hyperlipidemia    2. Arteriosclerosis of kidney, stage 5 chronic kidney  disease or end stage renal disease    3. Paroxysmal atrial fibrillation         Plan:           Multiple-type hyperlipidemia    Arteriosclerosis of kidney, stage 5 chronic kidney disease or end stage renal disease    Paroxysmal atrial fibrillation    Patient's blood pressures are naturally better at this point. Perhaps the dialysis and being treated for chronic kidney disease has mitigated that problem. She is taking her Lipitor. She is also on warfarin. Fall precautions have been discussed.    Warfarin precautions also have been discussed.    I've reviewed her immunizations and she had a Prevnar 13 last year. Ev[facility] she [facility] pneumonia [facility] and I would like to get the details.    Follow-up in 3-4 months. Forward any records from cardiology.  Current Outpatient Prescriptions on File Prior to Visit   Medication Sig Dispense Refill    atorvastatin (LIPITOR) 40 MG tablet Take 40 mg by mouth once daily.      [DISCONTINUED] docusate sodium (COLACE) 100 MG capsule Take 100 mg by mouth 2 (two) times daily.      [DISCONTINUED] traMADol (ULTRAM) 50 mg tablet 1 tablet daily as needed.      [DISCONTINUED] ALPRAZolam (XANAX) 1 MG tablet 1 tablet every Tues, Thurs, Sat.      [DISCONTINUED] clopidogrel (PLAVIX) 75 mg tablet Take 1 tablet by mouth Daily.      [DISCONTINUED] ondansetron (ZOFRAN) 4 MG tablet TAKE ONE TABLET BY MOUTH EVERY 8 HOURS AS NEEDED FOR NAUSEA 20 tablet 1     No current facility-administered medications on file prior to visit.

## 2018-05-04 PROBLEM — M50.30 DDD (DEGENERATIVE DISC DISEASE), CERVICAL: Status: ACTIVE | Noted: 2018-05-04

## 2018-05-04 PROBLEM — M75.52 BILATERAL SHOULDER BURSITIS: Status: ACTIVE | Noted: 2018-05-04

## 2018-05-04 PROBLEM — M75.51 BILATERAL SHOULDER BURSITIS: Status: ACTIVE | Noted: 2018-05-04

## 2018-05-04 PROBLEM — M47.812 CERVICAL SPONDYLOSIS: Status: ACTIVE | Noted: 2018-05-04

## 2018-05-30 PROBLEM — G56.03 BILATERAL CARPAL TUNNEL SYNDROME: Status: ACTIVE | Noted: 2018-05-30

## 2018-05-30 PROBLEM — M50.20 CERVICAL HERNIATION: Status: ACTIVE | Noted: 2018-05-30

## 2018-05-30 PROBLEM — G56.21 CUBITAL TUNNEL SYNDROME ON RIGHT: Status: ACTIVE | Noted: 2018-05-30

## 2018-05-30 PROBLEM — M48.02 SPINAL STENOSIS IN CERVICAL REGION: Status: ACTIVE | Noted: 2018-05-30

## 2018-07-11 ENCOUNTER — OFFICE VISIT (OUTPATIENT)
Dept: FAMILY MEDICINE | Facility: CLINIC | Age: 78
End: 2018-07-11
Payer: MEDICARE

## 2018-07-11 VITALS
SYSTOLIC BLOOD PRESSURE: 124 MMHG | DIASTOLIC BLOOD PRESSURE: 68 MMHG | WEIGHT: 116 LBS | BODY MASS INDEX: 18.64 KG/M2 | HEART RATE: 67 BPM | HEIGHT: 66 IN

## 2018-07-11 DIAGNOSIS — R20.0 NUMBNESS AND TINGLING IN RIGHT HAND: ICD-10-CM

## 2018-07-11 DIAGNOSIS — E78.2 MULTIPLE-TYPE HYPERLIPIDEMIA: Primary | ICD-10-CM

## 2018-07-11 DIAGNOSIS — I48.0 PAROXYSMAL ATRIAL FIBRILLATION: ICD-10-CM

## 2018-07-11 DIAGNOSIS — I12.0: ICD-10-CM

## 2018-07-11 DIAGNOSIS — R20.2 NUMBNESS AND TINGLING IN RIGHT HAND: ICD-10-CM

## 2018-07-11 PROCEDURE — 99214 OFFICE O/P EST MOD 30 MIN: CPT | Mod: ,,, | Performed by: INTERNAL MEDICINE

## 2018-07-11 NOTE — PROGRESS NOTES
Subjective:       Patient ID: Colin Isaac is a 78 y.o. female.    Chief Complaint: Hyperlipidemia; Chronic Kidney Disease; Atrial Fibrillation; and Numbness hand right    Ms. colin Isaac comes for follow-up. She is a pleasant 78-year-old female. She had been diagnosed with end-stage kidney disease a couple of years back and has been initiated on hemodialysis thereafter. Initially she was struggling with her dialysis and side effects and had episodes of confusions when she was hospitalized.    Gradually she has overcome all these adversities and is more participant in activities of daily living and life.    I felt that with the loss of her  and also to for dialysis she might have been depressed but this does not seem to be the case. Her son in attendance agrees that she is hardly depressed and most of the time she is looking forward to living at full enriched life.    She also complains of numbness and tingling in the right hand. It is unclear if it involves the whole hand or specific fingers.      Hyperlipidemia   This is a chronic problem. The current episode started more than 1 year ago. The problem is controlled. Exacerbating diseases include chronic renal disease. She has no history of liver disease or obesity. Pertinent negatives include no chest pain, myalgias or shortness of breath. Current antihyperlipidemic treatment includes statins. Compliance problems include psychosocial issues.  Risk factors for coronary artery disease include dyslipidemia.   Atrial Fibrillation   Presents for follow-up visit. Symptoms are negative for chest pain, hypertension, palpitations, shortness of breath and syncope. The symptoms have been stable. Past medical history includes atrial fibrillation and hyperlipidemia. There are no medication compliance problems.       Past Medical History:   Diagnosis Date    Anxiety     Depression     Disorder of kidney and ureter     Encephalopathy acute 1/1/2018    End  stage kidney disease 6/17/2017    Gout     Hyperlipidemia     Hypertension     Moderate episode of recurrent major depressive disorder 1/17/2018    Nephropathy hypertensive, stage 5 chronic kidney disease or end stage renal disease 6/17/2017    Osteopenia of multiple sites 3/9/2018    Based upon bone density measurements. Patient also has chronic kidney disease.     Social History     Social History    Marital status:      Spouse name: N/A    Number of children: N/A    Years of education: N/A     Occupational History    Not on file.     Social History Main Topics    Smoking status: Never Smoker    Smokeless tobacco: Never Used    Alcohol use No    Drug use: No    Sexual activity: No     Other Topics Concern    Not on file     Social History Narrative    No narrative on file     Past Surgical History:   Procedure Laterality Date    CARDIAC SURGERY      WRIST SURGERY       Family History   Problem Relation Age of Onset    Heart disease Mother     Cancer Father        Review of Systems   Constitutional: Positive for activity change (better.), appetite change (improving..) and fatigue (Improving.). Negative for chills and fever. Unexpected weight change: lost 20-25 pounds post dialysis.   HENT: Negative for congestion, postnasal drip, sinus pressure and voice change (stuttering).    Eyes: Negative for pain, discharge and visual disturbance.   Respiratory: Negative for cough, chest tightness and shortness of breath.    Cardiovascular: Negative for chest pain, palpitations, leg swelling and syncope.   Gastrointestinal: Negative for abdominal distention, abdominal pain, anal bleeding, constipation, diarrhea, nausea and vomiting.   Endocrine: Negative for polydipsia and polyphagia.   Genitourinary: Negative for difficulty urinating and dysuria.        Patient does make some urine spontaneously.   Musculoskeletal: Negative for arthralgias, joint swelling and myalgias.   Skin: Negative for color  "change, pallor and rash.   Neurological: Positive for numbness. Negative for tremors, seizures, syncope and headaches.        She complains of numbness in the right hand.   Psychiatric/Behavioral: Negative for agitation, confusion and dysphoric mood. The patient is not nervous/anxious.        Objective:       Vitals:    07/11/18 1005   BP: 124/68   Pulse: 67   Weight: 52.6 kg (116 lb)   Height: 5' 6" (1.676 m)     Physical Exam   Constitutional: She appears well-developed and well-nourished. She is cooperative. No distress.   Patient gradually seems to be appearing better.   HENT:   Head: Normocephalic and atraumatic.   Mouth/Throat: No oropharyngeal exudate.   Eyes: Conjunctivae, EOM and lids are normal. Lids are everted and swept, no foreign bodies found. Right eye exhibits no discharge. Left eye exhibits no discharge. No scleral icterus. Right pupil is round and reactive. Left pupil is round and reactive.   Neck: Trachea normal and normal range of motion. Neck supple. No JVD present. No tracheal deviation present. No thyromegaly present.   Cardiovascular: Normal rate, regular rhythm, S1 normal and S2 normal.  Exam reveals no friction rub.    Murmur heard.   Systolic murmur is present with a grade of 2/6   Pulmonary/Chest: Breath sounds normal.   Abdominal: Soft. Bowel sounds are normal. There is no rigidity and no guarding.   Musculoskeletal:   Patient is able to ambulate and does ok today   Lymphadenopathy:     She has no cervical adenopathy.   Neurological: She is alert. Coordination normal.   Mild stutter   Skin: Skin is warm and dry.   Rt arm AV shunt with thrill   Psychiatric: Her behavior is normal. Her mood appears anxious. Her affect is not labile. She is not actively hallucinating. She does not express inappropriate judgment.   Patient has a more affable affect today.   Nursing note and vitals reviewed.    I. Did review patient's labs.  Total cholesterol-149.  HDL cholesterol-58. LDL cholesterol-77. " Cholesterol ratio 3.0.    Blood glucose 109. Creatinine 7 CBC shows hemoglobin of 10.9 and HCT 35.9 Platelets are 247.  Assessment:       1. Multiple-type hyperlipidemia    2. Arteriosclerosis of kidney, stage 5 chronic kidney disease or end stage renal disease    3. Paroxysmal atrial fibrillation    4. Numbness and tingling in right hand         Plan:           Multiple-type hyperlipidemia    Arteriosclerosis of kidney, stage 5 chronic kidney disease or end stage renal disease    Paroxysmal atrial fibrillation    Numbness and tingling in right hand    Patient is going to dialysis 3 times a week. Her general outlook at life has increased. She is compliant to her medications. She is taking warfarin for atrial fibrillation. She is being monitored by Dr. Bonner.    Advised her warfarin cautions and fall precautions. Dietary precautions again discussed.    She did have a pneumonia vaccine probably at the local pharmacy or [facility] and have requested records for the same.    She has some subjective numbness in the right hand and have advised her to try some B complex vitamins.    Reviewed recent labs from Dr Bonner/Lipid panel is good. Cr > 7 - expected    Perhaps patient may be hyperextending her right wrist when she sleeps with extended wrists and her head resting on the wrist.    She would benefit from a wrist splint She can take over-the-counter B complex.    This issue will be addressed at follow up also.    Fup 4 months or earlier as needed.

## 2018-07-11 NOTE — PATIENT INSTRUCTIONS
What Is Atrial Flutter/Atrial Fibrillation?      The heart has its own electrical system. This system makes the signals that start each heartbeat. The heartbeat begins in 1 of the 2 upper chambers of the heart (atria). A problem can make the atria beat faster than normal. The atria may beat fast but still evenly. This problem is called atrial flutter. If the atria beat very fast and also unevenly, it is called atrial fibrillation (AFib).  Causes of Atrial Flutter and Atrial Fibrillation  Causes of these problems can include:  · Previous heart attack  · High blood pressure  · Thyroid problems  In many cases, the cause is unknown.  When the Atria Beat Too Fast  The atria may beat fast only once in a while. This is called a paroxysmal heart rhythm problem. If they beat fast all the time, it is a chronic problem.  Atrial Flutter  With atrial flutter, electrical signals travel around and around inside the atria. These circling signals make the atria beat too fast:  · Atrial flutter can cause symptoms similar to AFib. It can also lead to the even faster, uneven rhythms of AFib.  Atrial Fibrillation (AFib)  With AFib, cells in the atria send extra electrical signals. These extra signals make the atria beat very fast. They also beat unevenly:  · The atria beat so fast and unevenly that they may quiver instead of franklyn. If the atria dont contract, they dont move enough blood into the 2 lower chambers of the heart (ventricles). This can cause you to feel dizzy or weak.  · Blood that doesnt keep moving can pool and form clots in the atria. These clots can move into other parts of the body and cause serious problems such as a stroke.   Symptoms of Atrial Flutter and AFib  These symptoms include the following:  · Palpitations (a fluttering, fast heartbeat)  · Weakness or tiredness  · Shortness of breath  · Chest pain or tightness  · Dizziness or lightheadedness  · Fainting spells   Date Last Reviewed: 2/26/2014  ©  6338-5877 [employer]. [address] [place] [address], [place] [address]. All rights reserved. This information is not intended as a substitute for professional medical care. Always follow your healthcare professional's instructions.

## 2018-09-12 ENCOUNTER — TELEPHONE (OUTPATIENT)
Dept: FAMILY MEDICINE | Facility: CLINIC | Age: 78
End: 2018-09-12

## 2018-09-12 DIAGNOSIS — L81.9 PIGMENTATION ABNORMALITY OF SKIN: Primary | ICD-10-CM

## 2018-09-12 NOTE — TELEPHONE ENCOUNTER
Pt wants reff to dermo for brown spots on face and legs  Pended to Igor  Please sign     Order printed for abnormal skin pigmentation

## 2018-11-13 ENCOUNTER — OFFICE VISIT (OUTPATIENT)
Dept: FAMILY MEDICINE | Facility: CLINIC | Age: 78
End: 2018-11-13
Payer: MEDICARE

## 2018-11-13 VITALS
DIASTOLIC BLOOD PRESSURE: 53 MMHG | WEIGHT: 121 LBS | SYSTOLIC BLOOD PRESSURE: 102 MMHG | BODY MASS INDEX: 19.44 KG/M2 | HEART RATE: 67 BPM | HEIGHT: 66 IN

## 2018-11-13 DIAGNOSIS — Z23 PNEUMOCOCCAL VACCINE ADMINISTERED: ICD-10-CM

## 2018-11-13 DIAGNOSIS — N18.6 STAGE 5 CHRONIC KIDNEY DISEASE ON CHRONIC DIALYSIS: ICD-10-CM

## 2018-11-13 DIAGNOSIS — I12.0 BENIGN HYPERTENSION WITH ESRD (END-STAGE RENAL DISEASE): ICD-10-CM

## 2018-11-13 DIAGNOSIS — N18.6 BENIGN HYPERTENSION WITH ESRD (END-STAGE RENAL DISEASE): ICD-10-CM

## 2018-11-13 DIAGNOSIS — I48.0 PAROXYSMAL ATRIAL FIBRILLATION: ICD-10-CM

## 2018-11-13 DIAGNOSIS — Z99.2 DEPENDENCE ON RENAL DIALYSIS: ICD-10-CM

## 2018-11-13 DIAGNOSIS — E78.2 MULTIPLE-TYPE HYPERLIPIDEMIA: Primary | ICD-10-CM

## 2018-11-13 DIAGNOSIS — Z99.2 STAGE 5 CHRONIC KIDNEY DISEASE ON CHRONIC DIALYSIS: ICD-10-CM

## 2018-11-13 PROCEDURE — 90732 PPSV23 VACC 2 YRS+ SUBQ/IM: CPT | Mod: ,,, | Performed by: INTERNAL MEDICINE

## 2018-11-13 PROCEDURE — 1101F PT FALLS ASSESS-DOCD LE1/YR: CPT | Mod: ,,, | Performed by: INTERNAL MEDICINE

## 2018-11-13 PROCEDURE — 99214 OFFICE O/P EST MOD 30 MIN: CPT | Mod: 25,,, | Performed by: INTERNAL MEDICINE

## 2018-11-13 PROCEDURE — G0009 ADMIN PNEUMOCOCCAL VACCINE: HCPCS | Mod: ,,, | Performed by: INTERNAL MEDICINE

## 2018-11-13 NOTE — PATIENT INSTRUCTIONS
Taking Your Blood Pressure  Blood pressure is the force of blood against the artery wall as it moves from the heart through the blood vessels. You can take your own blood pressure reading using a digital monitor. Take your readings the same each time, using the same arm. Take readings as often as your healthcare provider instructs.  About blood pressure monitors  Blood pressure monitors are designed for certain ages and cases. You can find monitors for older adults, for pregnant women, and for children. Make sure the one you choose is the right one for your age and situation.  The American Heart Association recommends an automatic cuff monitor that fits on your upper arm (bicep). The cuff should fit your arm size. A cuff thats too large or too small will not give an accurate reading. Measure around your upper arm to find your size.  Monitors that attach to your finger or wrist are not as accurate as monitors for your upper arm.  Ask your healthcare provider for help in choosing a monitor. Bring your monitor to your next provider visit if you need help in using it the correct way.  The steps below are general instructions for using an automatic digital monitor.  Step 1. Relax    · Take your blood pressure at the same time every day, such as in the morning or evening, or at the time your healthcare provider recommends.  · Wait at least a half-hour after smoking, eating, or exercising. Don't drink coffee, tea, soda, or other caffeinated beverages before checking your blood pressure.  · Sit comfortably at a table with both feet on the floor. Do not cross your legs or feet. Place the monitor near you.  · Rest for a few minutes before you begin.  Step 2. Wrap the cuff    · Place your arm on the table, palm up. Your arm should be at the level of your heart. Wrap the cuff around your upper arm, just above your elbow. Its best done on bare skin, not over clothing. Most cuffs will indicate where the brachial artery (the  blood vessel in the middle of the arm at the inner side of the elbow) should line up with the cuff. Look in your monitor's instruction booklet for an illustration. You can also bring your cuff to your healthcare provider and have them show you how to correctly place the cuff.  Step 3. Inflate the cuff    · Push the button that starts the pump.  · The cuff will tighten, then loosen.  · The numbers will change. When they stop changing, your blood pressure reading will appear.  · Take 2 or 3 readings one minute apart.  Step 4. Write down the results of each reading    · Write down your blood pressure numbers for each reading. Note the date and time. Keep your results in one place, such as a notebook. Even if your monitor has a built-in memory, keep a hard copy of the readings.  · Remove the cuff from your arm. Turn off the machine.  · Bring your blood pressure records with your healthcare providers at each visit.  · If you start a new blood pressure medicine, note the day you started the new medicine. Also note the day if you change the dose of your medicine. This information goes on your blood pressure recording sheet. This will help your healthcare provider monitor how well the medicine changes are working.  · Ask your healthcare provider what numbers should prompt you to call him or her. Also ask what numbers should prompt you to get help right away.  Date Last Reviewed: 11/1/2016  © 5358-6797 [employer]. [address] [place] [address], [place] [address]. All rights reserved. This information is not intended as a substitute for professional medical care. Always follow your healthcare professional's instructions.

## 2018-11-13 NOTE — PROGRESS NOTES
Subjective:       Patient ID: Tyra Isaac is a 78 y.o. female.    Chief Complaint: Hyperlipidemia; Chronic Kidney Disease; and Atrial Fibrillation    Pt comes for follow up - she has underlying chronic end-stage kidney disease and goes thru dialysis 3 times a week. She is tolerating the dialysis procedure fairly okay except for some tingling and numbness in the hand after dialysis and mild lightheadedness after dialysis. She is also getting vitamin D, iron supplements thru the dialysis.     She is supposed to follow-up with her nephrologist Dr. Maynard was off recently.    Patient is also on chronic anticoagulation with warfarin. Her previous cardiologist Dr. matthew has relocated and she has started seeing Dr. Harsh Thompson now. She already had a visit with him thus far.    Thus far she is okay. Apparently she recently had a nerve conduction study which was not completed because of her anticoagulation status. Ordered by Dr Ocampo.    Patient is updated on flu vaccination for 2018.          Hyperlipidemia   This is a chronic problem. The current episode started more than 1 year ago. The problem is controlled. She has no history of obesity. Pertinent negatives include no chest pain, myalgias or shortness of breath. Current antihyperlipidemic treatment includes statins. Risk factors for coronary artery disease include a sedentary lifestyle and dyslipidemia.   Atrial Fibrillation   Presents for follow-up visit. Symptoms are negative for bradycardia, chest pain, dizziness, hypertension, hypotension, pacemaker problem, palpitations, shortness of breath and syncope. The symptoms have been stable. Past medical history includes atrial fibrillation and hyperlipidemia.   Should also has hypertension which after dialysis has been remarkably better. In fact currently she is not on any antihypertensive medications.    Past Medical History:   Diagnosis Date    Anxiety     Depression     Disorder of kidney and ureter      Encephalopathy acute 1/1/2018    End stage kidney disease 6/17/2017    Gout     Hyperlipidemia     Hypertension     Moderate episode of recurrent major depressive disorder 1/17/2018    Nephropathy hypertensive, stage 5 chronic kidney disease or end stage renal disease 6/17/2017    Osteopenia of multiple sites 3/9/2018    Based upon bone density measurements. Patient also has chronic kidney disease.     Social History     Socioeconomic History    Marital status:      Spouse name: Not on file    Number of children: Not on file    Years of education: Not on file    Highest education level: Not on file   Social Needs    Financial resource strain: Not on file    Food insecurity - worry: Not on file    Food insecurity - inability: Not on file    Transportation needs - medical: Not on file    Transportation needs - non-medical: Not on file   Occupational History    Not on file   Tobacco Use    Smoking status: Never Smoker    Smokeless tobacco: Never Used   Substance and Sexual Activity    Alcohol use: No    Drug use: No    Sexual activity: No   Other Topics Concern    Not on file   Social History Narrative    Not on file     Past Surgical History:   Procedure Laterality Date    CARDIAC SURGERY      WRIST SURGERY       Family History   Problem Relation Age of Onset    Heart disease Mother     Cancer Father        Review of Systems   Constitutional: Positive for fatigue (Improving.). Negative for activity change, appetite change (improving...), chills and fever. Unexpected weight change: lost 20-25 pounds post dialysis.   HENT: Negative for congestion, postnasal drip, sinus pressure and voice change (stuttering).    Eyes: Negative for pain, discharge and visual disturbance.   Respiratory: Negative for cough, chest tightness and shortness of breath.    Cardiovascular: Negative for chest pain, palpitations, leg swelling and syncope.   Gastrointestinal: Negative for abdominal distention,  "abdominal pain, constipation and vomiting.   Endocrine: Negative for polydipsia and polyphagia.   Genitourinary: Negative for difficulty urinating and dysuria.        Patient does make some urine spontaneously.   Musculoskeletal: Negative for arthralgias, joint swelling and myalgias.   Skin: Negative for color change, pallor and rash.   Neurological: Positive for numbness. Negative for dizziness, tremors, seizures, syncope and headaches.        She complains of numbness in the right hand.   Psychiatric/Behavioral: Negative for agitation, confusion and dysphoric mood. The patient is not nervous/anxious.          Objective:      Blood pressure (!) 102/53, pulse 67, height 5' 6" (1.676 m), weight 54.9 kg (121 lb). Body mass index is 19.53 kg/m².  Physical Exam   Constitutional: She appears well-developed and well-nourished. She is cooperative. No distress.   Patient gradually seems to be appearing better.   HENT:   Head: Normocephalic and atraumatic.   Mouth/Throat: No oropharyngeal exudate.   Eyes: Conjunctivae, EOM and lids are normal. Lids are everted and swept, no foreign bodies found. Right eye exhibits no discharge. Left eye exhibits no discharge. No scleral icterus. Right pupil is round and reactive. Left pupil is round and reactive.   Neck: Trachea normal and normal range of motion. Neck supple. No JVD present. No tracheal deviation present. No thyromegaly present.   Cardiovascular: Normal rate, regular rhythm, S1 normal and S2 normal. Exam reveals no friction rub.   Murmur heard.   Systolic murmur is present with a grade of 2/6.  Pulmonary/Chest: Breath sounds normal.   Abdominal: Soft. Bowel sounds are normal. There is no rigidity and no guarding.   Musculoskeletal:   Patient is able to ambulate and does ok today   Lymphadenopathy:     She has no cervical adenopathy.   Neurological: She is alert. Coordination normal.   Skin: Skin is warm and dry.   Rt arm AV shunt with thrill   Psychiatric: Her behavior is " normal. Her mood appears anxious. Her affect is not labile. She is not actively hallucinating. Cognition and memory are impaired. She does not express inappropriate judgment.   Patient has a more affable affect today.  Some cognitive issues have started. She thought I was Dr. matthew.   Nursing note and vitals reviewed.        Assessment:       1. Multiple-type hyperlipidemia    2. Stage 5 chronic kidney disease on chronic dialysis    3. Paroxysmal atrial fibrillation    4. Pneumococcal vaccine administered    5. Dependence on renal dialysis    6. Benign hypertension with ESRD (end-stage renal disease)           No visits with results within 3 Month(s) from this visit.   Latest known visit with results is:   No results found for any previous visit.     Patient's multiple medical conditions have been noted. She is updated on influenza vaccination for 2018. She's not sure about pneumonia vaccines. She did get a pneumonia vaccine from the office in 2017 January-Prevnar 13. She should be due for pneumococcal 23 now.    Plan:           Multiple-type hyperlipidemia    Stage 5 chronic kidney disease on chronic dialysis    Paroxysmal atrial fibrillation    Pneumococcal vaccine administered  -     (In Office Administered) Pneumococcal Polysaccharide Vaccine (23 Valent) (SQ/IM)    Dependence on renal dialysis    Benign hypertension with ESRD (end-stage renal disease)      Patient's multiple issues have been noted. Injury precautions and warfarin precautions will be discussed again. She is following Dr. Thompson at this point.    Her blood pressure is under control. She has a stable sinus rhythm at this point.    Need to get copies of her old reports.    She'll be updated on pneumococcal 23 vaccine today. This completes her pneumonia series of immunizations for life.    She has been advised to keep follow-up with Dr. Maynard also.      Current Outpatient Medications:     atorvastatin (LIPITOR) 40 MG tablet, Take 40 mg by mouth once  daily., Disp: , Rfl:     docusate sodium (COLACE) 100 MG capsule, Take 100 mg by mouth 2 (two) times daily., Disp: , Rfl:     dorzolamide-timolol 2-0.5% (COSOPT) 22.3-6.8 mg/mL ophthalmic solution, 1 drop Daily., Disp: , Rfl:     latanoprost 0.005 % ophthalmic solution, 1 drop 2 (two) times daily., Disp: , Rfl:     lidocaine 5 % Crea, Apply topically., Disp: , Rfl:     warfarin (COUMADIN) 2 MG tablet, 1 tablet Daily., Disp: , Rfl:

## 2018-11-19 PROBLEM — Z99.2 DEPENDENCE ON RENAL DIALYSIS: Status: ACTIVE | Noted: 2018-11-19

## 2018-11-19 PROBLEM — N18.6 BENIGN HYPERTENSION WITH ESRD (END-STAGE RENAL DISEASE): Status: ACTIVE | Noted: 2017-12-20

## 2018-11-19 PROBLEM — I12.0 BENIGN HYPERTENSION WITH ESRD (END-STAGE RENAL DISEASE): Status: ACTIVE | Noted: 2017-12-20

## 2019-02-14 ENCOUNTER — OFFICE VISIT (OUTPATIENT)
Dept: FAMILY MEDICINE | Facility: CLINIC | Age: 79
End: 2019-02-14
Payer: MEDICARE

## 2019-02-14 VITALS
DIASTOLIC BLOOD PRESSURE: 65 MMHG | SYSTOLIC BLOOD PRESSURE: 125 MMHG | BODY MASS INDEX: 20.57 KG/M2 | HEIGHT: 66 IN | HEART RATE: 75 BPM | WEIGHT: 128 LBS

## 2019-02-14 DIAGNOSIS — N18.6 BENIGN HYPERTENSION WITH ESRD (END-STAGE RENAL DISEASE): Primary | ICD-10-CM

## 2019-02-14 DIAGNOSIS — Z99.2 STAGE 5 CHRONIC KIDNEY DISEASE ON CHRONIC DIALYSIS: ICD-10-CM

## 2019-02-14 DIAGNOSIS — N18.6 STAGE 5 CHRONIC KIDNEY DISEASE ON CHRONIC DIALYSIS: ICD-10-CM

## 2019-02-14 DIAGNOSIS — I48.0 PAROXYSMAL ATRIAL FIBRILLATION: ICD-10-CM

## 2019-02-14 DIAGNOSIS — I25.118 CORONARY ARTERY DISEASE OF NATIVE HEART WITH STABLE ANGINA PECTORIS, UNSPECIFIED VESSEL OR LESION TYPE: ICD-10-CM

## 2019-02-14 DIAGNOSIS — I12.0 BENIGN HYPERTENSION WITH ESRD (END-STAGE RENAL DISEASE): Primary | ICD-10-CM

## 2019-02-14 DIAGNOSIS — E78.2 MULTIPLE-TYPE HYPERLIPIDEMIA: ICD-10-CM

## 2019-02-14 PROCEDURE — 1101F PT FALLS ASSESS-DOCD LE1/YR: CPT | Mod: ,,, | Performed by: INTERNAL MEDICINE

## 2019-02-14 PROCEDURE — 1101F PR PT FALLS ASSESS DOC 0-1 FALLS W/OUT INJ PAST YR: ICD-10-PCS | Mod: ,,, | Performed by: INTERNAL MEDICINE

## 2019-02-14 PROCEDURE — 99214 OFFICE O/P EST MOD 30 MIN: CPT | Mod: ,,, | Performed by: INTERNAL MEDICINE

## 2019-02-14 PROCEDURE — 99214 PR OFFICE/OUTPT VISIT, EST, LEVL IV, 30-39 MIN: ICD-10-PCS | Mod: ,,, | Performed by: INTERNAL MEDICINE

## 2019-02-14 NOTE — PATIENT INSTRUCTIONS
Aerobic Exercise for a Healthy Heart  Exercise is a lot more than an energy booster and a stress reliever. It also strengthens your heart muscle, lowers your blood pressure and cholesterol, and burns calories. It can also improve your resting muscle tone, and your mood.     Remember, some activity is better than none.    Choose an aerobic activity  Choose an activity that makes your heart and lungs work harder than they do when you rest or walk normally. This aerobic exercise can improve the way your heart and other muscles use oxygen. Make it fun by exercising with a friend and choosing an activity you enjoy. Here are some ideas:  · Walking  · Swimming  · Bicycling  · Stair climbing  · Dancing  · Jogging  · Gardening  Exercise regularly  If you havent been exercising regularly,  get your doctors OK first. Then start slowly.  Here are some tips:  · Begin exercising 3 times a week for 5 to 10 minutes at a time.  · When you feel comfortable, add a few minutes each session.  · Slowly build up to exercising 3 to 4 times each week. Each session should last for 40 minutes, on average, and involve moderate- to vigorous-intensity physical activity.  · If you have been given nitroglycerin, be sure to carry it when you exercise.  · If you get chest pain (angina) when youre exercising, stop what youre doing, take your nitroglycerin, and call your doctor.  Date Last Reviewed: 6/2/2016  © 6873-0673 [employer]. [address] [place] [address], [place] [address]. All rights reserved. This information is not intended as a substitute for professional medical care. Always follow your healthcare professional's instructions.

## 2019-02-14 NOTE — PROGRESS NOTES
Subjective:       Patient ID: Tyra Isaac is a 78 y.o. female.    Chief Complaint: Hypertension; Chronic Kidney Disease; Hyperlipidemia; Atrial Fibrillation; and Coronary Artery Disease    Ms. Cristina Isaac is a pleasant 78-year-old -American female who comes for follow-up. She is accompanied with her son. Underlying medical issues include long-standing history of hypertension leading to chronic kidney disease. After she had been started on dialysis 3 times a week, her blood pressures have gradually stabilized and she is currently not on any medications.    She is also on chronic anticoagulation with warfarin which is being monitored by Dr. Thompson. Her previous cardiologist was Dr. matthew who has since relocated and movement.    She also has hyperlipidemia which is controlled by atorvastatin at 40 mg per day.    Initially after dialysis she had a period ofTime when she could not tolerate the sudden change in her body physiology and also had behavioral decompensation. She had frequent hospitalization at that point. Gradually and slowly she has overcome those issues and is currently in fact enjoying life.    Her right arm AV shunt is working adequately as she go through dialysis. She has been  for several years.    Patient is also On chronic anticoagulation at this point. She used to see Dr. matthew in past. Apparently she had a history of paroxysmal atrial fibrillation and history of CVA in past. She is tolerating the warfarin without any problems. No bleeding episodes. No evidence of GI bleed.    Patient does not have current anginal or chest symptoms. Exercise tolerance is fair. She is not on aspirin at this point.      Hypertension   This is a chronic problem. The current episode started more than 1 year ago. The problem is controlled. Pertinent negatives include no chest pain, headaches, palpitations or shortness of breath. Risk factors for coronary artery disease include sedentary  lifestyle. The current treatment provides moderate improvement. Identifiable causes of hypertension include chronic renal disease. There is no history of pheochromocytoma.   Hyperlipidemia   This is a chronic problem. Exacerbating diseases include chronic renal disease. Pertinent negatives include no chest pain, myalgias or shortness of breath. Current antihyperlipidemic treatment includes statins. The current treatment provides moderate improvement of lipids. Risk factors for coronary artery disease include dyslipidemia.   Atrial Fibrillation   Presents for follow-up visit. Symptoms are negative for chest pain, dizziness, hypertension, palpitations, shortness of breath, syncope and tachycardia. The symptoms have been stable. Past medical history includes atrial fibrillation and hyperlipidemia. Medication compliance problems include psychosocial issues.       Past Medical History:   Diagnosis Date    Anxiety     Depression     Disorder of kidney and ureter     Encephalopathy acute 1/1/2018    End stage kidney disease 6/17/2017    Gout     Hyperlipidemia     Hypertension     Moderate episode of recurrent major depressive disorder 1/17/2018    Nephropathy hypertensive, stage 5 chronic kidney disease or end stage renal disease 6/17/2017    Osteopenia of multiple sites 3/9/2018    Based upon bone density measurements. Patient also has chronic kidney disease.     Social History     Socioeconomic History    Marital status:      Spouse name: Not on file    Number of children: Not on file    Years of education: Not on file    Highest education level: Not on file   Social Needs    Financial resource strain: Not on file    Food insecurity - worry: Not on file    Food insecurity - inability: Not on file    Transportation needs - medical: Not on file    Transportation needs - non-medical: Not on file   Occupational History    Not on file   Tobacco Use    Smoking status: Never Smoker    Smokeless  "tobacco: Never Used   Substance and Sexual Activity    Alcohol use: No    Drug use: No    Sexual activity: No   Other Topics Concern    Not on file   Social History Narrative    Not on file     Past Surgical History:   Procedure Laterality Date    CARDIAC SURGERY      WRIST SURGERY       Family History   Problem Relation Age of Onset    Heart disease Mother     Cancer Father        Review of Systems   Constitutional: Positive for fatigue (Improving.). Negative for activity change, appetite change (improving...), chills and fever. Unexpected weight change: lost 20-25 pounds post dialysis.   HENT: Negative for congestion, postnasal drip, sinus pressure and voice change (stuttering).    Eyes: Negative for pain, discharge and visual disturbance.   Respiratory: Negative for cough, chest tightness and shortness of breath.    Cardiovascular: Negative for chest pain, palpitations, leg swelling and syncope.   Gastrointestinal: Negative for abdominal distention, abdominal pain, constipation and vomiting.   Endocrine: Negative for polydipsia and polyphagia.   Genitourinary: Negative for difficulty urinating and dysuria.        Patient does make some urine spontaneously.   Musculoskeletal: Negative for arthralgias, joint swelling and myalgias.   Skin: Negative for color change, pallor and rash.   Neurological: Positive for numbness. Negative for dizziness, tremors, seizures, syncope and headaches.        She complains of numbness in the right hand.   Psychiatric/Behavioral: Negative for agitation, confusion and dysphoric mood. The patient is not nervous/anxious.          Objective:      Blood pressure 125/65, pulse 75, height 5' 6" (1.676 m), weight 58.1 kg (128 lb). Body mass index is 20.66 kg/m².  Physical Exam   Constitutional: She appears well-developed and well-nourished. She is cooperative. No distress.   Patient gradually seems to be appearing better.   HENT:   Head: Normocephalic and atraumatic.   Mouth/Throat: " No oropharyngeal exudate.   Eyes: Conjunctivae, EOM and lids are normal. Lids are everted and swept, no foreign bodies found. Right eye exhibits no discharge. Left eye exhibits no discharge. No scleral icterus. Right pupil is round and reactive. Left pupil is round and reactive.   Neck: Trachea normal and normal range of motion. Neck supple. No JVD present. No tracheal deviation present. No thyromegaly present.   Cardiovascular: Normal rate, regular rhythm, S1 normal and S2 normal. Exam reveals no friction rub.   Murmur heard.   Systolic murmur is present with a grade of 2/6.  Pulmonary/Chest: Breath sounds normal.   Abdominal: Soft. Bowel sounds are normal. There is no rigidity and no guarding.   Musculoskeletal: She exhibits no deformity.        Arms:  Patient is able to ambulate and does ok today   Lymphadenopathy:     She has no cervical adenopathy.   Neurological: She is alert. Coordination normal.   Skin: Skin is warm and dry.   Rt arm AV shunt with thrill   Psychiatric: She has a normal mood and affect. Her speech is normal and behavior is normal. Her affect is not labile. She is not actively hallucinating. She does not express inappropriate judgment.   Patient has a more affable affect today.  Some cognitive issues have started. Today She called me  Dr. Kalpesh Goel. Last time she had called me Dr. matthew.   Nursing note and vitals reviewed.        Assessment:       1. Benign hypertension with ESRD (end-stage renal disease)    2. Multiple-type hyperlipidemia    3. Stage 5 chronic kidney disease on chronic dialysis    4. Paroxysmal atrial fibrillation    5. Coronary artery disease of native heart with stable angina pectoris, unspecified vessel or lesion type           No visits with results within 3 Month(s) from this visit.   Latest known visit with results is:   No results found for any previous visit.         Plan:           Benign hypertension with ESRD (end-stage renal disease)    Multiple-type  hyperlipidemia    Stage 5 chronic kidney disease on chronic dialysis    Paroxysmal atrial fibrillation    Coronary artery disease of native heart with stable angina pectoris, unspecified vessel or lesion type      Patient has been advised to watch diet and exercise. Avoid fried and fatty food. Compliance to medications and follow up urged.    Patient has been advised to watch diet and exercise. Avoid fried and fatty food. Compliance to medications and follow up urged.    Advised Ms. Isaac about age and season appropriate immunizations/ cancer screenings.  Also seasonal influenza vaccine, update on tetanus diphtheria vaccination every 10 years.    Fall and injury precautions discussed. Discussed about medical and social issues for approximately 25 minutes.    The patient is asked to make an attempt to improve diet and exercise patterns to aid in medical management of this problem.    Fup 6 momths       Current Outpatient Medications:     atorvastatin (LIPITOR) 40 MG tablet, Take 40 mg by mouth once daily., Disp: , Rfl:     docusate sodium (COLACE) 100 MG capsule, Take 100 mg by mouth 2 (two) times daily., Disp: , Rfl:     dorzolamide-timolol 2-0.5% (COSOPT) 22.3-6.8 mg/mL ophthalmic solution, 1 drop Daily., Disp: , Rfl:     latanoprost 0.005 % ophthalmic solution, 1 drop 2 (two) times daily., Disp: , Rfl:     lidocaine 5 % Crea, Apply topically., Disp: , Rfl:     warfarin (COUMADIN) 2 MG tablet, 1 tablet Daily., Disp: , Rfl:

## 2019-03-28 ENCOUNTER — TELEPHONE (OUTPATIENT)
Dept: FAMILY MEDICINE | Facility: CLINIC | Age: 79
End: 2019-03-28

## 2019-03-28 NOTE — TELEPHONE ENCOUNTER
----- Message from Kalpesh Goel MD sent at 3/28/2019  9:19 AM CDT -----  The results are within acceptable range.  Please keep regular follow up.

## 2019-08-14 ENCOUNTER — OFFICE VISIT (OUTPATIENT)
Dept: FAMILY MEDICINE | Facility: CLINIC | Age: 79
End: 2019-08-14
Payer: MEDICARE

## 2019-08-14 VITALS
BODY MASS INDEX: 20.25 KG/M2 | SYSTOLIC BLOOD PRESSURE: 114 MMHG | WEIGHT: 126 LBS | HEIGHT: 66 IN | HEART RATE: 81 BPM | DIASTOLIC BLOOD PRESSURE: 52 MMHG

## 2019-08-14 DIAGNOSIS — N18.6 STAGE 5 CHRONIC KIDNEY DISEASE ON CHRONIC DIALYSIS: ICD-10-CM

## 2019-08-14 DIAGNOSIS — Z23 NEED FOR SHINGLES VACCINE: ICD-10-CM

## 2019-08-14 DIAGNOSIS — E78.2 MULTIPLE-TYPE HYPERLIPIDEMIA: Primary | ICD-10-CM

## 2019-08-14 DIAGNOSIS — Z99.2 STAGE 5 CHRONIC KIDNEY DISEASE ON CHRONIC DIALYSIS: ICD-10-CM

## 2019-08-14 DIAGNOSIS — I25.118 CORONARY ARTERY DISEASE OF NATIVE HEART WITH STABLE ANGINA PECTORIS, UNSPECIFIED VESSEL OR LESION TYPE: ICD-10-CM

## 2019-08-14 DIAGNOSIS — I48.0 PAROXYSMAL ATRIAL FIBRILLATION: ICD-10-CM

## 2019-08-14 PROCEDURE — 99214 OFFICE O/P EST MOD 30 MIN: CPT | Mod: S$GLB,,, | Performed by: INTERNAL MEDICINE

## 2019-08-14 PROCEDURE — 1101F PR PT FALLS ASSESS DOC 0-1 FALLS W/OUT INJ PAST YR: ICD-10-PCS | Mod: S$GLB,,, | Performed by: INTERNAL MEDICINE

## 2019-08-14 PROCEDURE — 99999 PR PBB SHADOW E&M-EST. PATIENT-LVL III: ICD-10-PCS | Mod: PBBFAC,,, | Performed by: INTERNAL MEDICINE

## 2019-08-14 PROCEDURE — 99214 PR OFFICE/OUTPT VISIT, EST, LEVL IV, 30-39 MIN: ICD-10-PCS | Mod: S$GLB,,, | Performed by: INTERNAL MEDICINE

## 2019-08-14 PROCEDURE — 99999 PR PBB SHADOW E&M-EST. PATIENT-LVL III: CPT | Mod: PBBFAC,,, | Performed by: INTERNAL MEDICINE

## 2019-08-14 PROCEDURE — 1101F PT FALLS ASSESS-DOCD LE1/YR: CPT | Mod: S$GLB,,, | Performed by: INTERNAL MEDICINE

## 2019-08-14 RX ORDER — CALCIUM ACETATE 667 MG/1
667 CAPSULE ORAL DAILY
Refills: 6 | COMMUNITY
Start: 2019-07-22 | End: 2022-02-14

## 2019-08-14 NOTE — PATIENT INSTRUCTIONS
Kidney Disease: Avoiding High-Sodium Foods  Sodium is a mineral that the body needs in small amounts. Sodium is found in table salt. Table salt is sodium chloride. Most people eat far more salt than they need. There are 2 main reasons for this. Salt is present in high amounts in most processed foods (pre-prepared foods like breakfast cereals, cookies, and pickles) and in all restaurant foods. In other words, if you are not cooking from fresh ingredients at home, you are very likely eating more salt than you need. When sodium intake is too high, it can increase thirst and cause the body to retain fluid. This can increase blood pressure and strain the kidneys. If you have chronic kidney disease, try not to eat the foods listed here, unless the label states that they are made without salt. People with chronic kidney disease should restrict their sodium intake to less than 1,500 mg of sodium (3,800 mg of table salt) each day.     · Canned and processed foods, such as gravies, instant cereal, packaged noodles and potato mixes, olives, pickles, soups, vegetables  · Cheeses, such as American, Blue, Parmesan, Roquefort  · Cured meats, such as gutierrez, beef jerky, bologna, corned beef, ham, hot dogs, sandwich meats, sausages  · Fast foods, such as burritos, fish sandwiches, milk shakes, salted French fries, tacos  · Frozen foods, such as meat pies, TV dinners, waffles  · Salted snacks, such as chips, crackers, peanut popcorn, pretzels, and nuts  · Other packaged items, such as antacids, baking soda, bouillon, catsup, lite salt, relish, salted butter and margarine, soy and teriyaki sauce, steak sauce, vegetable juices  Date Last Reviewed: 2/1/2017 © 2000-2017 [employer]. [address] [place] [address], [place] [address]. All rights reserved. This information is not intended as a substitute for professional medical care. Always follow your healthcare professional's instructions.

## 2019-08-14 NOTE — PROGRESS NOTES
Subjective:       Patient ID: Tyra Isaac is a 79 y.o. female.    Chief Complaint: Hypertension; Chronic Kidney Disease; and CAD F/U    Patient is a 79-year-old female who comes for follow-up.  She has end-stage kidney disease and is on hemodialysis 3 times a week.  This is going on for the last several years.  Initially there was a rough patch in her life when dialysis cause her more problems with some encephalopathy.  Gradually things are settling down and getting better at this point.  She is off the blood pressure medications because of dialysis.    She is taking atorvastatin 40 mg at bedtime and is being followed by Dr. Carreon.  She also is on chronic anticoagulation with warfarin which is being monitored by Dr. carreon.  No abnormal or unusual bleeding.    Patient also has coronary artery disease. Patient is currently not on aspirin.  The reason needs to be clarified.  From the nephrologist or the cardiologist.    Hyperlipidemia   This is a chronic problem. The current episode started more than 1 year ago. The problem is controlled. Exacerbating diseases include chronic renal disease. She has no history of hypothyroidism or obesity. Pertinent negatives include no chest pain or myalgias. Current antihyperlipidemic treatment includes statins. The current treatment provides moderate improvement of lipids. Risk factors for coronary artery disease include a sedentary lifestyle and dyslipidemia.   Atrial Fibrillation   Presents for follow-up visit. Symptoms include hypertension. Symptoms are negative for bradycardia, chest pain, dizziness, pacemaker problem, palpitations, syncope and tachycardia. The symptoms have been stable. Past medical history includes atrial fibrillation and hyperlipidemia. Medication compliance problems include psychosocial issues.       Past Medical History:   Diagnosis Date    Anxiety     Depression     Disorder of kidney and ureter     Encephalopathy acute 1/1/2018    End  stage kidney disease 6/17/2017    Gout     Hyperlipidemia     Hypertension     Moderate episode of recurrent major depressive disorder 1/17/2018    Nephropathy hypertensive, stage 5 chronic kidney disease or end stage renal disease 6/17/2017    Osteopenia of multiple sites 3/9/2018    Based upon bone density measurements. Patient also has chronic kidney disease.     Social History     Socioeconomic History    Marital status:      Spouse name: Aria Isaac    Number of children: 3    Years of education: Not on file    Highest education level: Not on file   Occupational History    Occupation: Not working   Social Needs    Financial resource strain: Not on file    Food insecurity:     Worry: Not on file     Inability: Not on file    Transportation needs:     Medical: Not on file     Non-medical: Not on file   Tobacco Use    Smoking status: Never Smoker    Smokeless tobacco: Never Used   Substance and Sexual Activity    Alcohol use: No    Drug use: No    Sexual activity: Not Currently   Lifestyle    Physical activity:     Days per week: Not on file     Minutes per session: Not on file    Stress: Not at all   Relationships    Social connections:     Talks on phone: Not on file     Gets together: Not on file     Attends Hinduism service: Not on file     Active member of club or organization: Not on file     Attends meetings of clubs or organizations: Not on file     Relationship status: Not on file   Other Topics Concern    Not on file   Social History Narrative    Not on file     Past Surgical History:   Procedure Laterality Date    CARDIAC SURGERY      WRIST SURGERY       Family History   Problem Relation Age of Onset    Heart disease Mother     Cancer Father        Review of Systems   Constitutional: Positive for fatigue (Improving.). Negative for activity change, appetite change (improving...), chills and fever. Unexpected weight change: lost 20-25 pounds post dialysis.  "  HENT: Negative for congestion, postnasal drip, sinus pressure and voice change (stuttering).    Eyes: Negative for pain, discharge and visual disturbance.   Respiratory: Negative for cough and chest tightness.    Cardiovascular: Negative for chest pain, palpitations, leg swelling and syncope.        A fib on anticoagulation   Gastrointestinal: Negative for abdominal distention, abdominal pain, constipation and vomiting.   Endocrine: Negative for polydipsia and polyphagia.   Genitourinary: Negative for difficulty urinating and dysuria.        Patient does make some urine spontaneously.   Musculoskeletal: Negative for arthralgias, joint swelling and myalgias.   Skin: Negative for color change, pallor and rash.   Neurological: Positive for numbness. Negative for dizziness, tremors, seizures and syncope.        She complains of numbness in the right hand.   Psychiatric/Behavioral: Negative for agitation, confusion and dysphoric mood. The patient is not nervous/anxious.          Objective:      Blood pressure (!) 114/52, pulse 81, height 5' 6" (1.676 m), weight 57.2 kg (126 lb). Body mass index is 20.34 kg/m².  Physical Exam   Constitutional: She appears well-developed and well-nourished. She is cooperative. No distress.   Patient gradually seems to be appearing better.   HENT:   Head: Normocephalic and atraumatic.   Mouth/Throat: No oropharyngeal exudate.   Eyes: Conjunctivae, EOM and lids are normal. Lids are everted and swept, no foreign bodies found. Right eye exhibits no discharge. Left eye exhibits no discharge. No scleral icterus. Right pupil is round and reactive. Left pupil is round and reactive.   Neck: Trachea normal and normal range of motion. Neck supple. No JVD present. No tracheal deviation present. No thyromegaly present.   Cardiovascular: Normal rate, regular rhythm, S1 normal and S2 normal. Exam reveals no friction rub.   Murmur heard.   Systolic murmur is present with a grade of " 2/6.  Pulmonary/Chest: Breath sounds normal. No respiratory distress.   Abdominal: Soft. Bowel sounds are normal. There is no rigidity and no guarding.   Musculoskeletal: She exhibits no edema, tenderness or deformity.   Lymphadenopathy:     She has no cervical adenopathy.   Neurological: She is alert. Coordination normal.   Skin: Skin is warm and dry.   Rt arm AV shunt with thrill   Psychiatric: She has a normal mood and affect. Her speech is normal and behavior is normal. Her affect is not labile. She is not actively hallucinating. She does not express inappropriate judgment.   Nursing note and vitals reviewed.        Assessment:       1. Multiple-type hyperlipidemia    2. Stage 5 chronic kidney disease on chronic dialysis    3. Paroxysmal atrial fibrillation    4. Coronary artery disease of native heart with stable angina pectoris, unspecified vessel or lesion type    5. Need for shingles vaccine           No visits with results within 3 Month(s) from this visit.   Latest known visit with results is:   No results found for any previous visit.         Plan:           Multiple-type hyperlipidemia    Stage 5 chronic kidney disease on chronic dialysis    Paroxysmal atrial fibrillation    Coronary artery disease of native heart with stable angina pectoris, unspecified vessel or lesion type    Need for shingles vaccine  -     varicella-zoster gE-AS01B, PF, (SHINGRIX, PF,) 50 mcg/0.5 mL injection; Inject 0.5 mLs into the muscle once. 1st dose now and repeat 2-6 months booster dose for 1 dose  Dispense: 0.5 mL; Refill: 1    Advised Ms. Isaac about age and season appropriate immunizations/ cancer screenings.  Also seasonal influenza vaccine, update on tetanus diphtheria vaccination every 10 years.    Patient continues to watch her diet and her son does the cooking any makes sure that she does not get any fatty, greasy or non recommended food.    She does have a somewhat irregular rhythm and has a stable atrial  fibrillation.    Need to check on the status Of aspirin.      I will give her a prescription for shingles vaccine.    Fall cautions          Current Outpatient Medications:     atorvastatin (LIPITOR) 40 MG tablet, Take 40 mg by mouth once daily., Disp: , Rfl:     calcium acetate (PHOSLO) 667 mg capsule, Take 667 mg by mouth 2 (two) times daily., Disp: , Rfl: 6    docusate sodium (COLACE) 100 MG capsule, Take 100 mg by mouth 2 (two) times daily., Disp: , Rfl:     dorzolamide-timolol 2-0.5% (COSOPT) 22.3-6.8 mg/mL ophthalmic solution, 1 drop Daily., Disp: , Rfl:     latanoprost 0.005 % ophthalmic solution, 1 drop 2 (two) times daily., Disp: , Rfl:     lidocaine 5 % Crea, Apply topically., Disp: , Rfl:     warfarin (COUMADIN) 2 MG tablet, 1 tablet Daily., Disp: , Rfl:     varicella-zoster gE-AS01B, PF, (SHINGRIX, PF,) 50 mcg/0.5 mL injection, Inject 0.5 mLs into the muscle once. 1st dose now and repeat 2-6 months booster dose for 1 dose, Disp: 0.5 mL, Rfl: 1

## 2019-12-13 PROBLEM — E87.6 HYPOKALEMIA: Status: ACTIVE | Noted: 2019-12-13

## 2019-12-13 PROBLEM — N18.6 ESRD (END STAGE RENAL DISEASE): Status: ACTIVE | Noted: 2019-12-13

## 2019-12-13 PROBLEM — I48.91 ATRIAL FIBRILLATION WITH RAPID VENTRICULAR RESPONSE: Status: ACTIVE | Noted: 2019-12-13

## 2019-12-13 PROBLEM — Z79.01 LONG TERM (CURRENT) USE OF ANTICOAGULANTS: Status: ACTIVE | Noted: 2019-12-13

## 2019-12-20 ENCOUNTER — TELEPHONE (OUTPATIENT)
Dept: FAMILY MEDICINE | Facility: CLINIC | Age: 79
End: 2019-12-20

## 2019-12-20 NOTE — TELEPHONE ENCOUNTER
----- Message from Cierra Rodriguez sent at 12/19/2019 12:16 PM CST -----  Regarding: Hospital Followup Appt Requested  Contact: [phone]  Hospital: [facility]    Discharge date: 12/14    Discharge instructions: followup with PCP with 7-10 days    Please contact PT to christi followup appointment

## 2019-12-23 ENCOUNTER — OFFICE VISIT (OUTPATIENT)
Dept: FAMILY MEDICINE | Facility: CLINIC | Age: 79
End: 2019-12-23
Payer: MEDICARE

## 2019-12-23 VITALS
BODY MASS INDEX: 21.05 KG/M2 | HEART RATE: 84 BPM | DIASTOLIC BLOOD PRESSURE: 63 MMHG | HEIGHT: 66 IN | SYSTOLIC BLOOD PRESSURE: 117 MMHG | WEIGHT: 131 LBS

## 2019-12-23 DIAGNOSIS — I12.0 BENIGN HYPERTENSION WITH ESRD (END-STAGE RENAL DISEASE): ICD-10-CM

## 2019-12-23 DIAGNOSIS — E78.2 MULTIPLE-TYPE HYPERLIPIDEMIA: ICD-10-CM

## 2019-12-23 DIAGNOSIS — Z99.2 STAGE 5 CHRONIC KIDNEY DISEASE ON CHRONIC DIALYSIS: Primary | ICD-10-CM

## 2019-12-23 DIAGNOSIS — N18.6 STAGE 5 CHRONIC KIDNEY DISEASE ON CHRONIC DIALYSIS: Primary | ICD-10-CM

## 2019-12-23 DIAGNOSIS — N18.6 BENIGN HYPERTENSION WITH ESRD (END-STAGE RENAL DISEASE): ICD-10-CM

## 2019-12-23 DIAGNOSIS — I48.0 PAROXYSMAL ATRIAL FIBRILLATION: ICD-10-CM

## 2019-12-23 DIAGNOSIS — R55 SYNCOPE, UNSPECIFIED SYNCOPE TYPE: ICD-10-CM

## 2019-12-23 DIAGNOSIS — I25.118 CORONARY ARTERY DISEASE OF NATIVE HEART WITH STABLE ANGINA PECTORIS, UNSPECIFIED VESSEL OR LESION TYPE: ICD-10-CM

## 2019-12-23 PROCEDURE — 1101F PR PT FALLS ASSESS DOC 0-1 FALLS W/OUT INJ PAST YR: ICD-10-PCS | Mod: S$GLB,,, | Performed by: INTERNAL MEDICINE

## 2019-12-23 PROCEDURE — 1126F AMNT PAIN NOTED NONE PRSNT: CPT | Mod: S$GLB,,, | Performed by: INTERNAL MEDICINE

## 2019-12-23 PROCEDURE — 99214 OFFICE O/P EST MOD 30 MIN: CPT | Mod: S$GLB,,, | Performed by: INTERNAL MEDICINE

## 2019-12-23 PROCEDURE — 1101F PT FALLS ASSESS-DOCD LE1/YR: CPT | Mod: S$GLB,,, | Performed by: INTERNAL MEDICINE

## 2019-12-23 PROCEDURE — 99214 PR OFFICE/OUTPT VISIT, EST, LEVL IV, 30-39 MIN: ICD-10-PCS | Mod: S$GLB,,, | Performed by: INTERNAL MEDICINE

## 2019-12-23 PROCEDURE — 1126F PR PAIN SEVERITY QUANTIFIED, NO PAIN PRESENT: ICD-10-PCS | Mod: S$GLB,,, | Performed by: INTERNAL MEDICINE

## 2019-12-23 PROCEDURE — 1159F PR MEDICATION LIST DOCUMENTED IN MEDICAL RECORD: ICD-10-PCS | Mod: S$GLB,,, | Performed by: INTERNAL MEDICINE

## 2019-12-23 PROCEDURE — 1159F MED LIST DOCD IN RCRD: CPT | Mod: S$GLB,,, | Performed by: INTERNAL MEDICINE

## 2019-12-23 NOTE — PATIENT INSTRUCTIONS
"  Near-Fainting: Vagal Reaction  Fainting (syncope) is a temporary loss of consciousness (passing out). It is associated with a loss of postural tone. Postural tone is the constant contraction of the muscles in your body to help keep your body upright. It also helps blood return towards the heart and brain. Syncope occurs when there is reduced blood flow to the brain due to this common vagal reaction. A vagal reaction is a reflex response that causes a sudden drop in your blood pressure, and your pulse to slow down. If the pulse is low enough, the blood pressure falls and causes fainting or near-fainting. Lying down usually stops the reaction very quickly.  These are symptoms of near-fainting:  · Feeling lightheaded or like you are going to faint  · Weak pulse  · Nausea  · Sweating  · Blurred vision or feeling like your vision is "blacking out"  · Palpitations  · Chest pain  · Trouble breathing  · Cool and clammy skin  Causes for near-fainting include:  · Sudden emotional stress like fear, pain, panic, sight of blood  · Straining or overexertion, straining while using the toilet, coughing, sneezing  · Standing up too quickly, or standing up for too long a time  · Pregnancy  Home care  The following will help you care for yourself at home:  · Rest today and go back to your normal activities as soon as you are feeling back to normal.  · If you become light-headed or dizzy, lie down right away or sit with your head lowered between your knees.  · Stay hydrated and do not skip meals.  · Avoid prolonged standing and hot places  · Do what you can to prevent constipation. If you bear down excessively when trying to have a bowel movement, this can trigger a vagal response  There may be other causes for a vagal response and near-syncope. For example, this can happen after open-heart surgery when the heart muscle is inflamed and irritated.  Check with your doctor to see if there is testing you need such as a tilt-table test, " heart rhythm monitoring, or blood tests. Review the medicines you take with your healthcare provider and pharmacist to be sure the symptoms you have are not a side effect of a medicine.  Follow-up care  Follow up with your healthcare provider, or as advised.   If you are having frequent episodes of near-syncope or vagal reactions, be cautious about activities such as driving that could harm yourself or others if you were to faint. Do not drive or operate heavy machinery if you are feeling like you may faint.  Call 911  Call 911 if any of these occur:  · Another fainting spell occurs, and it is not explained by the common causes listed above  · Fainting or loss of consciousness  · Chest, arm, neck, jaw, back, or abdominal pain  · Shortness of breath  · Weakness, tingling, or numbness in one side of the face, one arm or leg  · Slurred speech, confusion, trouble walking or seeing  · Seizure  · Blood in vomit or stools (black or red color)  When to seek medical advice  Call your healthcare provider right away if you have occasional mild lightheadedness, especially when standing up.  Date Last Reviewed: 6/1/2016  © 6578-4408 [employer]. [address] [place] [address], [place] [address]. All rights reserved. This information is not intended as a substitute for professional medical care. Always follow your healthcare professional's instructions.

## 2019-12-23 NOTE — PROGRESS NOTES
"Subjective:       Patient ID: Tyra Isaac is a 79 y.o. female.    Chief Complaint: Hospital Follow Up (afib); Syncopal Episode; and Chronic Kidney Disease    Miss Isaac is a 79-year-old female who had to go to the hospital recently because she became hypotensive during a dialysis session.  She had not eaten any food prior to that .  She had not eaten food so that she could keep NPO appointment with her vascular surgeon for her fistula.  Dr. Horowitz.  She had put on winter clothing also.  This probably overestimated her weight and she was probably over dialyzed.    She was brought to the emergency room and she was rapidly ventilating.  She also had atrial fibrillation with rapid ventricular response.  Her son give her "Magic kiss" of confidence and she immediately came back to normal.    She was observed in the hospital and given some IV fluids.  She felt better very soon after that.  Please see the hospital summary copied and pasted ad verbatim.        HPI:   80yo AAF with ESRD, h/o CVA on coumadin, CAD, and HLD presented to the ED from Dr. Vasquez's office for a syncopal episode. The patient underwent dialysis in [place] this morning. She became hypotensive during dialysis prompting the unit to stop her dialysis. She reports of occasional intradialytic hypotension and states her dry weight has been adjusted due to "winter clothing" but cannot give me specifics. After dialysis she continued to have bleeding from her fistula, therefore the patient was sent to Dr. Vasquez's office for evaluation. While at Dr. Vasquez's office the patient became unresponsive and EMS was called. When they arrived, EMS noted the patient to be in AFib with RVR and the patient was shocked. However she continued to be hypotensive and tachycardic and was brought to the ED. In the ED she was given a fluid bolus and noted to intermittent go in and out of sinus rhythm. Cardiology was contacted who recommended more IV fluids and " observation overnight. [facility] consulted for admission.     * No surgery found *       Hospital Course:   Patient admitted with afib with rvr. Patient placed on telemetry and cardiology consulted. Patient hydrated with improvement in heart rate. No further workup per cardiology. Patient was eventually given close follow up at discharge. Patient instructed to closely monitor volume status as this felt to be significant contributor to patients atrial fibrillation. Patient eventually discharged home with close follow up and plans for patient to see vascular surgery for further evaluation of fistula.                       Hyperlipidemia   This is a chronic problem. The current episode started more than 1 year ago. The problem is controlled. Exacerbating diseases include chronic renal disease. She has no history of hypothyroidism or obesity. Pertinent negatives include no chest pain or myalgias. Current antihyperlipidemic treatment includes statins. The current treatment provides moderate improvement of lipids. Risk factors for coronary artery disease include a sedentary lifestyle and dyslipidemia.   Atrial Fibrillation   Presents for follow-up visit. Symptoms include hypertension. Symptoms are negative for bradycardia, chest pain, dizziness, pacemaker problem, palpitations, syncope and tachycardia. The symptoms have been stable. Past medical history includes atrial fibrillation and hyperlipidemia. Medication compliance problems include psychosocial issues.       Past Medical History:   Diagnosis Date    A-fib     Anxiety     Depression     Disorder of kidney and ureter     Encephalopathy acute 1/1/2018    End stage kidney disease 6/17/2017    Gout     Hyperlipidemia     Hypertension     Moderate episode of recurrent major depressive disorder 1/17/2018    Nephropathy hypertensive, stage 5 chronic kidney disease or end stage renal disease 6/17/2017    Osteopenia of multiple sites 3/9/2018    Based upon bone  density measurements. Patient also has chronic kidney disease.    Stroke 11/2016     Social History     Socioeconomic History    Marital status:      Spouse name: Aria Isaac    Number of children: 3    Years of education: Not on file    Highest education level: Not on file   Occupational History    Occupation: Not working   Social Needs    Financial resource strain: Not on file    Food insecurity:     Worry: Not on file     Inability: Not on file    Transportation needs:     Medical: Not on file     Non-medical: Not on file   Tobacco Use    Smoking status: Never Smoker    Smokeless tobacco: Never Used   Substance and Sexual Activity    Alcohol use: No    Drug use: No    Sexual activity: Not Currently   Lifestyle    Physical activity:     Days per week: Not on file     Minutes per session: Not on file    Stress: Not at all   Relationships    Social connections:     Talks on phone: Not on file     Gets together: Not on file     Attends Religion service: Not on file     Active member of club or organization: Not on file     Attends meetings of clubs or organizations: Not on file     Relationship status: Not on file   Other Topics Concern    Not on file   Social History Narrative    Not on file     Past Surgical History:   Procedure Laterality Date    CARDIAC SURGERY      stents    WRIST SURGERY       Family History   Problem Relation Age of Onset    Heart disease Mother     Cancer Father        Review of Systems   Constitutional: Positive for fatigue (Improving.). Negative for activity change, appetite change (improving...), chills and fever. Unexpected weight change: lost 20-25 pounds post dialysis.   HENT: Negative for congestion, postnasal drip, sinus pressure and voice change (stuttering).    Eyes: Negative for pain, discharge and visual disturbance.   Respiratory: Negative for cough and chest tightness.    Cardiovascular: Negative for chest pain, palpitations, leg swelling  "and syncope.        A fib on anticoagulation.  Patient has AV fistula on the right side of her arm.  This needs to be addressed with her vascular specialist.   Gastrointestinal: Negative for abdominal distention, abdominal pain, constipation and vomiting.   Endocrine: Negative for polydipsia and polyphagia.   Genitourinary: Negative for difficulty urinating and dysuria.        Patient does make some urine spontaneously.on Hemodialysis   Musculoskeletal: Negative for arthralgias, joint swelling and myalgias.   Skin: Negative for color change, pallor and rash.   Neurological: Positive for numbness. Negative for dizziness, tremors, seizures and syncope.        She complains of numbness in the right hand.   Psychiatric/Behavioral: Negative for agitation, confusion and dysphoric mood. The patient is not nervous/anxious.          Objective:      Blood pressure 117/63, pulse 84, height 5' 6" (1.676 m), weight 59.4 kg (131 lb). Body mass index is 21.14 kg/m².  Physical Exam   Constitutional: She appears well-developed and well-nourished. She is cooperative. No distress.   Patient gradually seems to be appearing better.   HENT:   Head: Normocephalic and atraumatic.   Mouth/Throat: No oropharyngeal exudate.   Eyes: Conjunctivae, EOM and lids are normal. Lids are everted and swept, no foreign bodies found. Right eye exhibits no discharge. Left eye exhibits no discharge. No scleral icterus. Right pupil is round and reactive. Left pupil is round and reactive.   Neck: Trachea normal and normal range of motion. Neck supple. No JVD present. No tracheal deviation present. No thyromegaly present.   Cardiovascular: Normal rate, regular rhythm, S1 normal and S2 normal. Exam reveals no friction rub.   Murmur heard.   Systolic murmur is present with a grade of 2/6.  Pulmonary/Chest: Breath sounds normal. No respiratory distress.   Abdominal: Soft. Bowel sounds are normal. There is no rigidity and no guarding.   Musculoskeletal: She " exhibits no edema, tenderness or deformity.   Lymphadenopathy:     She has no cervical adenopathy.   Neurological: She is alert. Coordination normal.   Skin: Skin is warm and dry.   Rt arm AV shunt with thrill   Psychiatric: She has a normal mood and affect. Her speech is normal and behavior is normal. Her affect is not labile. She is not actively hallucinating. She does not express inappropriate judgment.   Nursing note and vitals reviewed.        Assessment:       1. 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 Paroxysmal atrial fibrillation    3. Benign hypertension with ESRD (end-stage renal disease)    4. Coronary artery disease of native heart with stable angina pectoris, unspecified vessel or lesion type    5. Multiple-type hyperlipidemia    6. Syncope, unspecified syncope type           Admission on 12/13/2019, Discharged on 12/14/2019   Component Date Value Ref Range Status    WBC 12/13/2019 5.38  3.90 - 12.70 K/uL Final    RBC 12/13/2019 3.54* 4.00 - 5.40 M/uL Final    Hemoglobin 12/13/2019 10.6* 12.0 - 16.0 g/dL Final    Hematocrit 12/13/2019 34.0* 37.0 - 48.5 % Final    Mean Corpuscular Volume 12/13/2019 96  82 - 98 fL Final    Mean Corpuscular Hemoglobin 12/13/2019 29.9  27.0 - 31.0 pg Final    Mean Corpuscular Hemoglobin Conc 12/13/2019 31.2* 32.0 - 36.0 g/dL Final    RDW 12/13/2019 13.9  11.5 - 14.5 % Final    Platelets 12/13/2019 226  150 - 350 K/uL Final    MPV 12/13/2019 9.6  9.2 - 12.9 fL Final    Immature Granulocytes 12/13/2019 0.4  0.0 - 0.5 % Final    Gran # (ANC) 12/13/2019 2.4  1.8 - 7.7 K/uL Final    Immature Grans (Abs) 12/13/2019 0.02  0.00 - 0.04 K/uL Final    Lymph # 12/13/2019 1.7  1.0 - 4.8 K/uL Final    Mono # 12/13/2019 0.9  0.3 - 1.0 K/uL Final    Eos # 12/13/2019 0.4  0.0 - 0.5 K/uL Final    Baso # 12/13/2019 0.03  0.00 - 0.20 K/uL Final    nRBC 12/13/2019 1* 0 /100 WBC Final    Gran% 12/13/2019 45.3  38.0 - 73.0 % Final    Lymph% 12/13/2019 31.2   18.0 - 48.0 % Final    Mono% 12/13/2019 16.0* 4.0 - 15.0 % Final    Eosinophil% 12/13/2019 6.5  0.0 - 8.0 % Final    Basophil% 12/13/2019 0.6  0.0 - 1.9 % Final    Differential Method 12/13/2019 Automated   Final    Sodium 12/13/2019 138  136 - 145 mmol/L Final    Potassium 12/13/2019 3.2* 3.5 - 5.1 mmol/L Final    Chloride 12/13/2019 94* 95 - 110 mmol/L Final    CO2 12/13/2019 35* 22 - 31 mmol/L Final    Glucose 12/13/2019 96  70 - 110 mg/dL Final    BUN, Bld 12/13/2019 14  7 - 18 mg/dL Final    Creatinine 12/13/2019 4.43* 0.50 - 1.40 mg/dL Final    Calcium 12/13/2019 8.7  8.4 - 10.2 mg/dL Final    Total Protein 12/13/2019 6.9  6.0 - 8.4 g/dL Final    Albumin 12/13/2019 4.1  3.5 - 5.2 g/dL Final    Total Bilirubin 12/13/2019 0.7  0.2 - 1.3 mg/dL Final    Alkaline Phosphatase 12/13/2019 71  38 - 145 U/L Final    AST 12/13/2019 27  14 - 36 U/L Final    ALT 12/13/2019 28  10 - 44 U/L Final    Anion Gap 12/13/2019 9  8 - 16 mmol/L Final    eGFR if African American 12/13/2019 10* >60 mL/min/1.73 m^2 Final    eGFR if non African American 12/13/2019 9* >60 mL/min/1.73 m^2 Final    Troponin I 12/13/2019 0.017  0.012 - 0.034 ng/mL Final    POC PH 12/13/2019 7.51* 7.35 - 7.45 Final    POC PCO2 12/13/2019 43  35.0 - 45.0 mmHg Final    POC PO2 12/13/2019 90  80.0 - 100.0 mmHg Final    POC tHb 12/13/2019 9.8* 12.0 - 18.0 g/dL Final    POC O2Hb 12/13/2019 97.2  94.0 - 100.0 % Final    POC COHb 12/13/2019 0.7  <1.6 % Final    POC MetHb 12/13/2019 0.0  <3.0 % Final    POC SATURATED O2 12/13/2019 97.9  94.0 - 100.0 % Final    POC pH Temp 12/13/2019 7.51* 7.35 - 7.45 Final    POC pCO2 Temp 12/13/2019 43  35.0 - 45.0 mmHg Final    POC pO2 Temp 12/13/2019 90  80.0 - 100.0 mmHg Final    POC Temp 12/13/2019 37.0  C Final    Mode #1 12/13/2019 -   Final    O2 Device #1 12/13/2019 -   Final    O2 Device #2 12/13/2019 -   Final    FiO2 12/13/2019 -  % Final    Mech VT 12/13/2019 -  mL Final    Mech  Rate (BPM) 12/13/2019 -  bpm Final    PiP 12/13/2019 -  cm H2O Final    PEEP 12/13/2019 -  cm H2O Final    Pressure Support 12/13/2019 -  cm H2O Final    Pressure Control 12/13/2019 -  cm H2O Final    Pulse Ox 12/13/2019 -  % Final    IPAP 12/13/2019 -  cm H2O Final    EPAP 12/13/2019 -  cm H2O Final    Flow 12/13/2019 -  LPM Final    Frequency 12/13/2019 -  Hz Final    I Time 12/13/2019 -   Final    Nitric 12/13/2019 -  PPM Final    PAW 12/13/2019 -  cmH2O Final    POC BE(B) 12/13/2019 10.1* -2.0 - 2.0 mmol/L Final    POC pAO2 12/13/2019 CANCELED  mmHg Final    POC HCO3-(c) 12/13/2019 34.3* 22.0 - 26.0 mmol/L Final    POC A-aDO2 12/13/2019 CANCELED  mmHg Final    Date of Draw 12/13/2019 [id number]   Final    Time of Draw 12/13/2019 1235   Final    Allens Test 12/13/2019 NA   Final    Analyzed by: 12/13/2019 MHB   Final    Drawn by: 12/13/2019 B   Final    Sample Site 12/13/2019 LBA   Final    PT 12/13/2019 22.7* 11.8 - 14.7 sec Final    INR 12/13/2019 2.2   Final    LVIDS 12/13/2019 3.00  2.1 - 4.0 cm Final    Ascending aorta 12/13/2019 2.9  cm Final    AV mean gradient 12/13/2019 4  mmHg Final    Ao peak romeo 12/13/2019 1.56  m/s Final    Ao VTI 12/13/2019 37.7  cm Final    IVS 12/13/2019 1.32* 0.6 - 1.1 cm Final    LA size 12/13/2019 3.4  cm Final    LVIDD 12/13/2019 3.67  3.5 - 6.0 cm Final    LVOT diameter 12/13/2019 2.4  cm Final    LVOT peak VTI 12/13/2019 29.00  cm Final    PW 12/13/2019 1.22* 0.6 - 1.1 cm Final    MV Peak A Romeo 12/13/2019 1.27  m/s Final    E wave decelartion time 12/13/2019 299  msec Final    RA Major Axis 12/13/2019 4.42  cm Final    RA Width 12/13/2019 2.96  cm Final    TR Max Romeo 12/13/2019 2.93  m/s Final    LA WIDTH 12/13/2019 3.49  cm Final    PV PEAK VELOCITY 12/13/2019 92.3  cm/s Final    LVOT peak romeo 12/13/2019 111.00  m/s Final    BSA 12/13/2019 1.63  m2 Final    TDI SEPTAL 12/13/2019 0.05  m/s Final    LV LATERAL E/E' RATIO  12/13/2019 16.25  m/s Final    LV SEPTAL E/E' RATIO 12/13/2019 26.00  m/s Final    TDI LATERAL 12/13/2019 0.08  m/s Final    FS 12/13/2019 18  28 - 44 % Final    LA volume 12/13/2019 44.88  cm3 Final    LV mass 12/13/2019 158.72  g Final    Left Ventricle Relative Wall Thick* 12/13/2019 0.66  cm Final    AV valve area 12/13/2019 3.48  cm2 Final    AV Velocity Ratio 12/13/2019 71.15   Final    AV index (prosthetic) 12/13/2019 0.77   Final    MV valve area p 1/2 method 12/13/2019 2.34  cm2 Final    E/A ratio 12/13/2019 1.02   Final    Mean e' 12/13/2019 0.07  m/s Final    LVOT area 12/13/2019 4.5  cm2 Final    LVOT stroke volume 12/13/2019 131.13  cm3 Final    AV peak gradient 12/13/2019 10  mmHg Final    E/E' ratio 12/13/2019 20.00  m/s Final    MV Peak E Romeo 12/13/2019 1.30  m/s Final    MV stenosis pressure 1/2 time 12/13/2019 94  ms Final    LA Volume Index 12/13/2019 27.3  mL/m2 Final    LV Mass Index 12/13/2019 97  g/m2 Final    Left Atrium Minor Axis 12/13/2019 4.45  cm Final    Left Atrium Major Axis 12/13/2019 4.45  cm Final    Triscuspid Valve Regurgitation Pea* 12/13/2019 34  mmHg Final    Right Atrial Pressure (from IVC) 12/13/2019 3  mmHg Final    TV rest pulmonary artery pressure 12/13/2019 37  mmHg Final    Magnesium 12/13/2019 2.0  1.6 - 2.6 mg/dL Final    POCT Glucose 12/13/2019 90  70 - 110 mg/dL Final    Sodium 12/14/2019 134* 136 - 145 mmol/L Final    Potassium 12/14/2019 4.5  3.5 - 5.1 mmol/L Final    Chloride 12/14/2019 97  95 - 110 mmol/L Final    CO2 12/14/2019 31  22 - 31 mmol/L Final    Glucose 12/14/2019 95  70 - 110 mg/dL Final    BUN, Bld 12/14/2019 23* 7 - 18 mg/dL Final    Creatinine 12/14/2019 5.97* 0.50 - 1.40 mg/dL Final    Calcium 12/14/2019 8.3* 8.4 - 10.2 mg/dL Final    Anion Gap 12/14/2019 6* 8 - 16 mmol/L Final    eGFR if  12/14/2019 7* >60 mL/min/1.73 m^2 Final    eGFR if non African American 12/14/2019 6* >60 mL/min/1.73 m^2  Final    WBC 12/14/2019 4.42  3.90 - 12.70 K/uL Final    RBC 12/14/2019 3.50* 4.00 - 5.40 M/uL Final    Hemoglobin 12/14/2019 10.6* 12.0 - 16.0 g/dL Final    Hematocrit 12/14/2019 33.9* 37.0 - 48.5 % Final    Mean Corpuscular Volume 12/14/2019 97  82 - 98 fL Final    Mean Corpuscular Hemoglobin 12/14/2019 30.3  27.0 - 31.0 pg Final    Mean Corpuscular Hemoglobin Conc 12/14/2019 31.3* 32.0 - 36.0 g/dL Final    RDW 12/14/2019 14.3  11.5 - 14.5 % Final    Platelets 12/14/2019 156  150 - 350 K/uL Final    MPV 12/14/2019 10.0  9.2 - 12.9 fL Final    Immature Granulocytes 12/14/2019 0.5  0.0 - 0.5 % Final    Gran # (ANC) 12/14/2019 2.1  1.8 - 7.7 K/uL Final    Immature Grans (Abs) 12/14/2019 0.02  0.00 - 0.04 K/uL Final    Lymph # 12/14/2019 1.2  1.0 - 4.8 K/uL Final    Mono # 12/14/2019 0.8  0.3 - 1.0 K/uL Final    Eos # 12/14/2019 0.3  0.0 - 0.5 K/uL Final    Baso # 12/14/2019 0.01  0.00 - 0.20 K/uL Final    nRBC 12/14/2019 0  0 /100 WBC Final    Gran% 12/14/2019 47.1  38.0 - 73.0 % Final    Lymph% 12/14/2019 27.8  18.0 - 48.0 % Final    Mono% 12/14/2019 17.4* 4.0 - 15.0 % Final    Eosinophil% 12/14/2019 7.0  0.0 - 8.0 % Final    Basophil% 12/14/2019 0.2  0.0 - 1.9 % Final    Differential Method 12/14/2019 Automated   Final    Glucose 12/14/2019 95  70 - 110 mg/dL Final    Sodium 12/14/2019 134* 136 - 145 mmol/L Final    Potassium 12/14/2019 4.5  3.5 - 5.1 mmol/L Final    Chloride 12/14/2019 97  95 - 110 mmol/L Final    CO2 12/14/2019 31  22 - 31 mmol/L Final    BUN, Bld 12/14/2019 23* 7 - 18 mg/dL Final    Calcium 12/14/2019 8.3* 8.4 - 10.2 mg/dL Final    Creatinine 12/14/2019 5.97* 0.50 - 1.40 mg/dL Final    Albumin 12/14/2019 3.5  3.5 - 5.2 g/dL Final    Phosphorus 12/14/2019 3.2  2.7 - 4.5 mg/dL Final    eGFR if  12/14/2019 7* >60 mL/min/1.73 m^2 Final    eGFR if non African American 12/14/2019 6* >60 mL/min/1.73 m^2 Final    Anion Gap 12/14/2019 6* 8 - 16 mmol/L  Final         Plan:           Stage 5 chronic kidney disease on chronic dialysis    Paroxysmal atrial fibrillation    Benign hypertension with ESRD (end-stage renal disease)    Coronary artery disease of native heart with stable angina pectoris, unspecified vessel or lesion type    Multiple-type hyperlipidemia    Syncope, unspecified syncope type     Patient's hospital stay has been reviewed again.  She has been advised not to go on empty stomach for dialysis given her passing out.  Again probably she or I will check with her nephrologist if she gets a right amount of dialysis.      Advised Ms. Isaac about age and season appropriate immunizations/ cancer screenings.  Also seasonal influenza vaccine, update on tetanus diphtheria vaccination every 10 years.    Patient continues to watch her diet and her son does the cooking any makes sure that she does not get any fatty, greasy or non recommended food.    Spent niesha 25 minutes with patient which involved review of pts medical conditions, labs, medications and with 50% of time face-to-face discussion about medical problems, management and any applicable changes.      I have reviewed her preventive care issues including immunizations and she is updated on the flu vaccinations, pneumonia vaccinations, tetanus vaccinations and shingles vaccination.  Fup 4 months      Current Outpatient Medications:     atorvastatin (LIPITOR) 40 MG tablet, Take 40 mg by mouth once daily., Disp: , Rfl:     b complex vitamins tablet, Take 1 tablet by mouth once daily., Disp: , Rfl:     calcium acetate (PHOSLO) 667 mg capsule, Take 667 mg by mouth 2 (two) times daily., Disp: , Rfl: 6    docusate sodium (COLACE) 100 MG capsule, Take 100 mg by mouth once daily. , Disp: , Rfl:     dorzolamide-timolol 2-0.5% (COSOPT) 22.3-6.8 mg/mL ophthalmic solution, Place 1 drop into both eyes 2 (two) times daily. , Disp: , Rfl:     latanoprost 0.005 % ophthalmic solution, Place 1 drop into both eyes  every evening. , Disp: , Rfl:     lidocaine 5 % Crea, Apply 1 application topically every Mon, Wed, Fri. , Disp: , Rfl:     warfarin (COUMADIN) 3 MG tablet, Take 1.5 mg by mouth every Tues, Thurs. , Disp: , Rfl:     warfarin (COUMADIN) 3 MG tablet, Take 3 mg by mouth every Mon, Wed, Fri, Sun., Disp: , Rfl:     warfarin (COUMADIN) 3 MG tablet, Take 3 mg by mouth every Saturday., Disp: , Rfl:

## 2020-04-27 ENCOUNTER — OFFICE VISIT (OUTPATIENT)
Dept: FAMILY MEDICINE | Facility: CLINIC | Age: 80
End: 2020-04-27
Payer: MEDICARE

## 2020-04-27 VITALS
TEMPERATURE: 98 F | BODY MASS INDEX: 20.41 KG/M2 | HEIGHT: 66 IN | WEIGHT: 127 LBS | HEART RATE: 67 BPM | DIASTOLIC BLOOD PRESSURE: 68 MMHG | SYSTOLIC BLOOD PRESSURE: 161 MMHG

## 2020-04-27 DIAGNOSIS — Z12.31 ENCOUNTER FOR SCREENING MAMMOGRAM FOR BREAST CANCER: ICD-10-CM

## 2020-04-27 DIAGNOSIS — N18.6 STAGE 5 CHRONIC KIDNEY DISEASE ON CHRONIC DIALYSIS: ICD-10-CM

## 2020-04-27 DIAGNOSIS — E78.2 MULTIPLE-TYPE HYPERLIPIDEMIA: ICD-10-CM

## 2020-04-27 DIAGNOSIS — Z99.2 STAGE 5 CHRONIC KIDNEY DISEASE ON CHRONIC DIALYSIS: ICD-10-CM

## 2020-04-27 DIAGNOSIS — I48.0 PAROXYSMAL ATRIAL FIBRILLATION: Primary | ICD-10-CM

## 2020-04-27 DIAGNOSIS — N18.6 BENIGN HYPERTENSION WITH ESRD (END-STAGE RENAL DISEASE): ICD-10-CM

## 2020-04-27 DIAGNOSIS — I25.118 CORONARY ARTERY DISEASE OF NATIVE HEART WITH STABLE ANGINA PECTORIS, UNSPECIFIED VESSEL OR LESION TYPE: ICD-10-CM

## 2020-04-27 DIAGNOSIS — I12.0 BENIGN HYPERTENSION WITH ESRD (END-STAGE RENAL DISEASE): ICD-10-CM

## 2020-04-27 PROCEDURE — 1159F MED LIST DOCD IN RCRD: CPT | Mod: S$GLB,,, | Performed by: INTERNAL MEDICINE

## 2020-04-27 PROCEDURE — 99214 OFFICE O/P EST MOD 30 MIN: CPT | Mod: S$GLB,,, | Performed by: INTERNAL MEDICINE

## 2020-04-27 PROCEDURE — 1159F PR MEDICATION LIST DOCUMENTED IN MEDICAL RECORD: ICD-10-PCS | Mod: S$GLB,,, | Performed by: INTERNAL MEDICINE

## 2020-04-27 PROCEDURE — 1101F PR PT FALLS ASSESS DOC 0-1 FALLS W/OUT INJ PAST YR: ICD-10-PCS | Mod: S$GLB,,, | Performed by: INTERNAL MEDICINE

## 2020-04-27 PROCEDURE — 1126F AMNT PAIN NOTED NONE PRSNT: CPT | Mod: S$GLB,,, | Performed by: INTERNAL MEDICINE

## 2020-04-27 PROCEDURE — 99214 PR OFFICE/OUTPT VISIT, EST, LEVL IV, 30-39 MIN: ICD-10-PCS | Mod: S$GLB,,, | Performed by: INTERNAL MEDICINE

## 2020-04-27 PROCEDURE — 1126F PR PAIN SEVERITY QUANTIFIED, NO PAIN PRESENT: ICD-10-PCS | Mod: S$GLB,,, | Performed by: INTERNAL MEDICINE

## 2020-04-27 PROCEDURE — 1101F PT FALLS ASSESS-DOCD LE1/YR: CPT | Mod: S$GLB,,, | Performed by: INTERNAL MEDICINE

## 2020-04-27 NOTE — PROGRESS NOTES
Subjective:       Patient ID: Tyra Isaac is a 79 y.o. female.    Chief Complaint: Hypertension; Hyperlipidemia; Chronic Kidney Disease; and Atrial Fibrillation    Miss Isaac is a 79-year-old female who comes for follow-up.    Underlying issues of chronic kidney disease on hemodialysis, chronic anticoagulation have been noted.  Patient did have a fistula repair surgery done by Dr. Vasquez sometimes in the last few months.  Is working well and she is going through dialysis 3 times a week.  She is taking her anticoagulation also.    Today her blood pressures are slightly on the higher side.  Historically she gets low blood pressure readings whenever she goes through dialysis assuming that her blood pressures have been permitted to ride a little higher.    She denies any chest pains.  She denies any shortness of breath or headaches.  Appetite is fair.  Cognition is reasonable.  Sleep is okay.  No incontinence symptoms.    Underlying atrial fibrillation also has been noted for which she is on chronic anticoagulation.    She is accompanied with her son.                                  Hyperlipidemia   This is a chronic problem. The current episode started more than 1 year ago. The problem is controlled. Exacerbating diseases include chronic renal disease. She has no history of hypothyroidism or obesity. Pertinent negatives include no chest pain, myalgias or shortness of breath. Current antihyperlipidemic treatment includes statins. The current treatment provides moderate improvement of lipids. Risk factors for coronary artery disease include a sedentary lifestyle and dyslipidemia.   Atrial Fibrillation   Presents for follow-up visit. Symptoms include hypertension. Symptoms are negative for bradycardia, chest pain, dizziness, hemodynamic instability, pacemaker problem, palpitations, shortness of breath, syncope, tachycardia and weakness. The symptoms have been stable. Past medical history includes atrial  fibrillation and hyperlipidemia. Medication compliance problems include psychosocial issues.       Past Medical History:   Diagnosis Date    A-fib     Anxiety     Depression     Disorder of kidney and ureter     Encephalopathy acute 1/1/2018    End stage kidney disease 6/17/2017    Gout     Hyperlipidemia     Hypertension     Moderate episode of recurrent major depressive disorder 1/17/2018    Nephropathy hypertensive, stage 5 chronic kidney disease or end stage renal disease 6/17/2017    Osteopenia of multiple sites 3/9/2018    Based upon bone density measurements. Patient also has chronic kidney disease.    Stroke 11/2016     Social History     Socioeconomic History    Marital status:      Spouse name: Aria Isaac    Number of children: 3    Years of education: Not on file    Highest education level: Not on file   Occupational History    Occupation: Not working   Social Needs    Financial resource strain: Not on file    Food insecurity:     Worry: Not on file     Inability: Not on file    Transportation needs:     Medical: Not on file     Non-medical: Not on file   Tobacco Use    Smoking status: Never Smoker    Smokeless tobacco: Never Used   Substance and Sexual Activity    Alcohol use: No    Drug use: No    Sexual activity: Not Currently   Lifestyle    Physical activity:     Days per week: Not on file     Minutes per session: Not on file    Stress: Not at all   Relationships    Social connections:     Talks on phone: Not on file     Gets together: Not on file     Attends Christianity service: Not on file     Active member of club or organization: Not on file     Attends meetings of clubs or organizations: Not on file     Relationship status: Not on file   Other Topics Concern    Not on file   Social History Narrative    Not on file     Past Surgical History:   Procedure Laterality Date    CARDIAC SURGERY      stents    WRIST SURGERY       Family History   Problem  "Relation Age of Onset    Heart disease Mother     Cancer Father        Review of Systems   Constitutional: Positive for fatigue (Improving.). Negative for activity change, appetite change (improving...), chills and fever. Unexpected weight change: lost 20-25 pounds post dialysis.   HENT: Negative for congestion, postnasal drip, sinus pressure and voice change (stuttering).    Eyes: Negative for pain, discharge and visual disturbance.   Respiratory: Negative for cough, chest tightness and shortness of breath.    Cardiovascular: Negative for chest pain, palpitations, leg swelling and syncope.        A fib on anticoagulation.  Patient has a functional AV fistula on the right side.   Gastrointestinal: Negative for abdominal distention, abdominal pain, constipation and vomiting.   Endocrine: Negative for polydipsia and polyphagia.   Genitourinary: Negative for difficulty urinating and dysuria.        Patient does make some urine spontaneously.on Hemodialysis   Musculoskeletal: Negative for arthralgias, joint swelling and myalgias.   Skin: Negative for color change, pallor and rash.   Neurological: Positive for numbness. Negative for dizziness, tremors, seizures, syncope and weakness.        She complains of numbness in the right hand.   Psychiatric/Behavioral: Negative for agitation, confusion and dysphoric mood. The patient is not nervous/anxious.         Expected age-related cognitive decline.         Objective:      Blood pressure (!) 161/68, pulse 67, temperature 98.2 °F (36.8 °C), height 5' 6" (1.676 m), weight 57.6 kg (127 lb). Body mass index is 20.5 kg/m².  Physical Exam   Constitutional: She appears well-developed and well-nourished. She is cooperative. No distress.   Patient gradually seems to be appearing better.   HENT:   Head: Normocephalic and atraumatic.   Mouth/Throat: No oropharyngeal exudate.   Eyes: Conjunctivae, EOM and lids are normal. Lids are everted and swept, no foreign bodies found. Right eye " exhibits no discharge. Left eye exhibits no discharge. No scleral icterus. Right pupil is round and reactive. Left pupil is round and reactive.   Neck: Trachea normal and normal range of motion. Neck supple. No JVD present. No tracheal deviation present. No thyromegaly present.   Cardiovascular: Normal rate, regular rhythm, S1 normal and S2 normal. Exam reveals no friction rub.   Murmur heard.   Systolic murmur is present with a grade of 2/6.  Dr Thompson Cardiologist   Pulmonary/Chest: Breath sounds normal. No respiratory distress.   Abdominal: Soft. Bowel sounds are normal. There is no rigidity and no guarding.   Musculoskeletal: She exhibits no edema, tenderness or deformity.   Lymphadenopathy:     She has no cervical adenopathy.   Neurological: She is alert. Coordination normal.   Skin: Skin is warm and dry.   Rt arm AV shunt with thrill   Psychiatric: She has a normal mood and affect. Her speech is normal and behavior is normal. Her affect is not labile. She is not actively hallucinating. She does not express inappropriate judgment.   Nursing note and vitals reviewed.        Assessment:       1. Paroxysmal atrial fibrillation    2. Benign hypertension with ESRD (end-stage renal disease)    3. Coronary artery disease of native heart with stable angina pectoris, unspecified vessel or lesion type    4. Multiple-type hyperlipidemia    5. Stage 5 chronic kidney disease on chronic dialysis    6. Encounter for screening mammogram for breast cancer           No visits with results within 3 Month(s) from this visit.   Latest known visit with results is:   Admission on 12/13/2019, Discharged on 12/14/2019   Component Date Value Ref Range Status    WBC 12/13/2019 5.38  3.90 - 12.70 K/uL Final    RBC 12/13/2019 3.54* 4.00 - 5.40 M/uL Final    Hemoglobin 12/13/2019 10.6* 12.0 - 16.0 g/dL Final    Hematocrit 12/13/2019 34.0* 37.0 - 48.5 % Final    Mean Corpuscular Volume 12/13/2019 96  82 - 98 fL Final    Mean  Corpuscular Hemoglobin 12/13/2019 29.9  27.0 - 31.0 pg Final    Mean Corpuscular Hemoglobin Conc 12/13/2019 31.2* 32.0 - 36.0 g/dL Final    RDW 12/13/2019 13.9  11.5 - 14.5 % Final    Platelets 12/13/2019 226  150 - 350 K/uL Final    MPV 12/13/2019 9.6  9.2 - 12.9 fL Final    Immature Granulocytes 12/13/2019 0.4  0.0 - 0.5 % Final    Gran # (ANC) 12/13/2019 2.4  1.8 - 7.7 K/uL Final    Immature Grans (Abs) 12/13/2019 0.02  0.00 - 0.04 K/uL Final    Lymph # 12/13/2019 1.7  1.0 - 4.8 K/uL Final    Mono # 12/13/2019 0.9  0.3 - 1.0 K/uL Final    Eos # 12/13/2019 0.4  0.0 - 0.5 K/uL Final    Baso # 12/13/2019 0.03  0.00 - 0.20 K/uL Final    nRBC 12/13/2019 1* 0 /100 WBC Final    Gran% 12/13/2019 45.3  38.0 - 73.0 % Final    Lymph% 12/13/2019 31.2  18.0 - 48.0 % Final    Mono% 12/13/2019 16.0* 4.0 - 15.0 % Final    Eosinophil% 12/13/2019 6.5  0.0 - 8.0 % Final    Basophil% 12/13/2019 0.6  0.0 - 1.9 % Final    Differential Method 12/13/2019 Automated   Final    Sodium 12/13/2019 138  136 - 145 mmol/L Final    Potassium 12/13/2019 3.2* 3.5 - 5.1 mmol/L Final    Chloride 12/13/2019 94* 95 - 110 mmol/L Final    CO2 12/13/2019 35* 22 - 31 mmol/L Final    Glucose 12/13/2019 96  70 - 110 mg/dL Final    BUN, Bld 12/13/2019 14  7 - 18 mg/dL Final    Creatinine 12/13/2019 4.43* 0.50 - 1.40 mg/dL Final    Calcium 12/13/2019 8.7  8.4 - 10.2 mg/dL Final    Total Protein 12/13/2019 6.9  6.0 - 8.4 g/dL Final    Albumin 12/13/2019 4.1  3.5 - 5.2 g/dL Final    Total Bilirubin 12/13/2019 0.7  0.2 - 1.3 mg/dL Final    Alkaline Phosphatase 12/13/2019 71  38 - 145 U/L Final    AST 12/13/2019 27  14 - 36 U/L Final    ALT 12/13/2019 28  10 - 44 U/L Final    Anion Gap 12/13/2019 9  8 - 16 mmol/L Final    eGFR if African American 12/13/2019 10* >60 mL/min/1.73 m^2 Final    eGFR if non African American 12/13/2019 9* >60 mL/min/1.73 m^2 Final    Troponin I 12/13/2019 0.017  0.012 - 0.034 ng/mL Final    POC PH  12/13/2019 7.51* 7.35 - 7.45 Final    POC PCO2 12/13/2019 43  35.0 - 45.0 mmHg Final    POC PO2 12/13/2019 90  80.0 - 100.0 mmHg Final    POC tHb 12/13/2019 9.8* 12.0 - 18.0 g/dL Final    POC O2Hb 12/13/2019 97.2  94.0 - 100.0 % Final    POC COHb 12/13/2019 0.7  <1.6 % Final    POC MetHb 12/13/2019 0.0  <3.0 % Final    POC SATURATED O2 12/13/2019 97.9  94.0 - 100.0 % Final    POC pH Temp 12/13/2019 7.51* 7.35 - 7.45 Final    POC pCO2 Temp 12/13/2019 43  35.0 - 45.0 mmHg Final    POC pO2 Temp 12/13/2019 90  80.0 - 100.0 mmHg Final    POC Temp 12/13/2019 37.0  C Final    Mode #1 12/13/2019 -   Final    O2 Device #1 12/13/2019 -   Final    O2 Device #2 12/13/2019 -   Final    FiO2 12/13/2019 -  % Final    Mech VT 12/13/2019 -  mL Final    Mech Rate (BPM) 12/13/2019 -  bpm Final    PiP 12/13/2019 -  cm H2O Final    PEEP 12/13/2019 -  cm H2O Final    Pressure Support 12/13/2019 -  cm H2O Final    Pressure Control 12/13/2019 -  cm H2O Final    Pulse Ox 12/13/2019 -  % Final    IPAP 12/13/2019 -  cm H2O Final    EPAP 12/13/2019 -  cm H2O Final    Flow 12/13/2019 -  LPM Final    Frequency 12/13/2019 -  Hz Final    I Time 12/13/2019 -   Final    Nitric 12/13/2019 -  PPM Final    PAW 12/13/2019 -  cmH2O Final    POC BE(B) 12/13/2019 10.1* -2.0 - 2.0 mmol/L Final    POC pAO2 12/13/2019 CANCELED  mmHg Final    POC HCO3-(c) 12/13/2019 34.3* 22.0 - 26.0 mmol/L Final    POC A-aDO2 12/13/2019 CANCELED  mmHg Final    Date of Draw 12/13/2019 [id number]   Final    Time of Draw 12/13/2019 1235   Final    Allens Test 12/13/2019 NA   Final    Analyzed by: 12/13/2019 MHB   Final    Drawn by: 12/13/2019 B   Final    Sample Site 12/13/2019 LBA   Final    PT 12/13/2019 22.7* 11.8 - 14.7 sec Final    INR 12/13/2019 2.2   Final    LVIDS 12/13/2019 3.00  2.1 - 4.0 cm Final    Ascending aorta 12/13/2019 2.9  cm Final    AV mean gradient 12/13/2019 4  mmHg Final    Ao peak paige 12/13/2019 1.56  m/s  Final    Ao VTI 12/13/2019 37.7  cm Final    IVS 12/13/2019 1.32* 0.6 - 1.1 cm Final    LA size 12/13/2019 3.4  cm Final    LVIDD 12/13/2019 3.67  3.5 - 6.0 cm Final    LVOT diameter 12/13/2019 2.4  cm Final    LVOT peak VTI 12/13/2019 29.00  cm Final    PW 12/13/2019 1.22* 0.6 - 1.1 cm Final    MV Peak A Romeo 12/13/2019 1.27  m/s Final    E wave decelartion time 12/13/2019 299  msec Final    RA Major Axis 12/13/2019 4.42  cm Final    RA Width 12/13/2019 2.96  cm Final    TR Max Romeo 12/13/2019 2.93  m/s Final    LA WIDTH 12/13/2019 3.49  cm Final    PV PEAK VELOCITY 12/13/2019 92.3  cm/s Final    LVOT peak romeo 12/13/2019 111.00  m/s Final    BSA 12/13/2019 1.63  m2 Final    TDI SEPTAL 12/13/2019 0.05  m/s Final    LV LATERAL E/E' RATIO 12/13/2019 16.25  m/s Final    LV SEPTAL E/E' RATIO 12/13/2019 26.00  m/s Final    TDI LATERAL 12/13/2019 0.08  m/s Final    FS 12/13/2019 18  28 - 44 % Final    LA volume 12/13/2019 44.88  cm3 Final    LV mass 12/13/2019 158.72  g Final    Left Ventricle Relative Wall Thick* 12/13/2019 0.66  cm Final    AV valve area 12/13/2019 3.48  cm2 Final    AV Velocity Ratio 12/13/2019 71.15   Final    AV index (prosthetic) 12/13/2019 0.77   Final    MV valve area p 1/2 method 12/13/2019 2.34  cm2 Final    E/A ratio 12/13/2019 1.02   Final    Mean e' 12/13/2019 0.07  m/s Final    LVOT area 12/13/2019 4.5  cm2 Final    LVOT stroke volume 12/13/2019 131.13  cm3 Final    AV peak gradient 12/13/2019 10  mmHg Final    E/E' ratio 12/13/2019 20.00  m/s Final    MV Peak E Romeo 12/13/2019 1.30  m/s Final    MV stenosis pressure 1/2 time 12/13/2019 94  ms Final    LA Volume Index 12/13/2019 27.3  mL/m2 Final    LV Mass Index 12/13/2019 97  g/m2 Final    Left Atrium Minor Axis 12/13/2019 4.45  cm Final    Left Atrium Major Axis 12/13/2019 4.45  cm Final    Triscuspid Valve Regurgitation Pea* 12/13/2019 34  mmHg Final    Right Atrial Pressure (from IVC) 12/13/2019 3   mmHg Final    TV rest pulmonary artery pressure 12/13/2019 37  mmHg Final    Magnesium 12/13/2019 2.0  1.6 - 2.6 mg/dL Final    POCT Glucose 12/13/2019 90  70 - 110 mg/dL Final    Sodium 12/14/2019 134* 136 - 145 mmol/L Final    Potassium 12/14/2019 4.5  3.5 - 5.1 mmol/L Final    Chloride 12/14/2019 97  95 - 110 mmol/L Final    CO2 12/14/2019 31  22 - 31 mmol/L Final    Glucose 12/14/2019 95  70 - 110 mg/dL Final    BUN, Bld 12/14/2019 23* 7 - 18 mg/dL Final    Creatinine 12/14/2019 5.97* 0.50 - 1.40 mg/dL Final    Calcium 12/14/2019 8.3* 8.4 - 10.2 mg/dL Final    Anion Gap 12/14/2019 6* 8 - 16 mmol/L Final    eGFR if  12/14/2019 7* >60 mL/min/1.73 m^2 Final    eGFR if non African American 12/14/2019 6* >60 mL/min/1.73 m^2 Final    WBC 12/14/2019 4.42  3.90 - 12.70 K/uL Final    RBC 12/14/2019 3.50* 4.00 - 5.40 M/uL Final    Hemoglobin 12/14/2019 10.6* 12.0 - 16.0 g/dL Final    Hematocrit 12/14/2019 33.9* 37.0 - 48.5 % Final    Mean Corpuscular Volume 12/14/2019 97  82 - 98 fL Final    Mean Corpuscular Hemoglobin 12/14/2019 30.3  27.0 - 31.0 pg Final    Mean Corpuscular Hemoglobin Conc 12/14/2019 31.3* 32.0 - 36.0 g/dL Final    RDW 12/14/2019 14.3  11.5 - 14.5 % Final    Platelets 12/14/2019 156  150 - 350 K/uL Final    MPV 12/14/2019 10.0  9.2 - 12.9 fL Final    Immature Granulocytes 12/14/2019 0.5  0.0 - 0.5 % Final    Gran # (ANC) 12/14/2019 2.1  1.8 - 7.7 K/uL Final    Immature Grans (Abs) 12/14/2019 0.02  0.00 - 0.04 K/uL Final    Lymph # 12/14/2019 1.2  1.0 - 4.8 K/uL Final    Mono # 12/14/2019 0.8  0.3 - 1.0 K/uL Final    Eos # 12/14/2019 0.3  0.0 - 0.5 K/uL Final    Baso # 12/14/2019 0.01  0.00 - 0.20 K/uL Final    nRBC 12/14/2019 0  0 /100 WBC Final    Gran% 12/14/2019 47.1  38.0 - 73.0 % Final    Lymph% 12/14/2019 27.8  18.0 - 48.0 % Final    Mono% 12/14/2019 17.4* 4.0 - 15.0 % Final    Eosinophil% 12/14/2019 7.0  0.0 - 8.0 % Final    Basophil%  12/14/2019 0.2  0.0 - 1.9 % Final    Differential Method 12/14/2019 Automated   Final    Glucose 12/14/2019 95  70 - 110 mg/dL Final    Sodium 12/14/2019 134* 136 - 145 mmol/L Final    Potassium 12/14/2019 4.5  3.5 - 5.1 mmol/L Final    Chloride 12/14/2019 97  95 - 110 mmol/L Final    CO2 12/14/2019 31  22 - 31 mmol/L Final    BUN, Bld 12/14/2019 23* 7 - 18 mg/dL Final    Calcium 12/14/2019 8.3* 8.4 - 10.2 mg/dL Final    Creatinine 12/14/2019 5.97* 0.50 - 1.40 mg/dL Final    Albumin 12/14/2019 3.5  3.5 - 5.2 g/dL Final    Phosphorus 12/14/2019 3.2  2.7 - 4.5 mg/dL Final    eGFR if  12/14/2019 7* >60 mL/min/1.73 m^2 Final    eGFR if non African American 12/14/2019 6* >60 mL/min/1.73 m^2 Final    Anion Gap 12/14/2019 6* 8 - 16 mmol/L Final         Plan:           Paroxysmal atrial fibrillation    Benign hypertension with ESRD (end-stage renal disease)    Coronary artery disease of native heart with stable angina pectoris, unspecified vessel or lesion type    Multiple-type hyperlipidemia    Stage 5 chronic kidney disease on chronic dialysis    Encounter for screening mammogram for breast cancer  -     Mammo Digital Screening Bilat; Future; Expected date: 04/27/2020     Patient's blood pressures are slightly on the higher side.  Perhaps these might be protective to her since she drops her blood pressure after the dialysis.    She is also on anticoagulation with warfarin.  This is being followed by Dr. Thompson her current cardiologist.  I have discussed her about being cautious on this medication because this is a blood thinner.  Any injury could be bad.    She is utilizing the COVID-19 precautions and trying to avoid going and lesvia its or in crowds.    I have advised her to check her blood pressures at home if possible and also try to write down her blood pressures during the dialysis.  See if they are higher or elevated before the dialysis starts.  Advised Ms. Isaac about age and season  appropriate immunizations/ cancer screenings.  Also seasonal influenza vaccine, update on tetanus diphtheria vaccination every 10 years.    Patient continues to watch her diet and her son does the cooking any makes sure that she does not get any fatty, greasy or non recommended food.    I have also discussed about mammogram and she will do it once the situation for COVID-19 clears up.      I have reviewed her preventive care issues including immunizations and she is updated on the flu vaccinations, pneumonia vaccinations, tetanus vaccinations and shingles vaccination.  Fup 4 months    Spent niesha 25 minutes with patient which involved review of pts medical conditions, labs, medications and with 50% of time face-to-face discussion about medical problems, management and any applicable changes.      Current Outpatient Medications:     atorvastatin (LIPITOR) 40 MG tablet, Take 40 mg by mouth once daily., Disp: , Rfl:     b complex vitamins tablet, Take 1 tablet by mouth once daily., Disp: , Rfl:     calcium acetate (PHOSLO) 667 mg capsule, Take 667 mg by mouth 2 (two) times daily., Disp: , Rfl: 6    docusate sodium (COLACE) 100 MG capsule, Take 100 mg by mouth once daily. , Disp: , Rfl:     dorzolamide-timolol 2-0.5% (COSOPT) 22.3-6.8 mg/mL ophthalmic solution, Place 1 drop into both eyes 2 (two) times daily. , Disp: , Rfl:     latanoprost 0.005 % ophthalmic solution, Place 1 drop into both eyes every evening. , Disp: , Rfl:     lidocaine 5 % Crea, Apply 1 application topically every Mon, Wed, Fri. , Disp: , Rfl:     warfarin (COUMADIN) 3 MG tablet, Take 1.5 mg by mouth every Tues, Thurs. , Disp: , Rfl:     warfarin (COUMADIN) 3 MG tablet, Take 3 mg by mouth every Mon, Wed, Fri, Sun., Disp: , Rfl:

## 2020-05-11 ENCOUNTER — TELEPHONE (OUTPATIENT)
Dept: FAMILY MEDICINE | Facility: CLINIC | Age: 80
End: 2020-05-11

## 2020-05-11 ENCOUNTER — HOSPITAL ENCOUNTER (OUTPATIENT)
Dept: RADIOLOGY | Facility: HOSPITAL | Age: 80
Discharge: HOME | End: 2020-05-11
Attending: INTERNAL MEDICINE
Payer: MEDICARE

## 2020-05-11 VITALS — HEIGHT: 66 IN | WEIGHT: 127 LBS | BODY MASS INDEX: 20.41 KG/M2

## 2020-05-11 DIAGNOSIS — Z12.31 ENCOUNTER FOR SCREENING MAMMOGRAM FOR BREAST CANCER: ICD-10-CM

## 2020-05-11 PROCEDURE — 77067 SCR MAMMO BI INCL CAD: CPT | Mod: TC,PO

## 2020-05-11 NOTE — TELEPHONE ENCOUNTER
----- Message from Kalpesh Goel MD sent at 5/11/2020 12:06 PM CDT -----  PLEASE NOTIFY PATIENT THAT HER MAMMOGRAM IS IN NORMAL RANGE.  NEXT MAMMOGRAM IN 1 YEAR TIME.

## 2020-08-31 ENCOUNTER — OFFICE VISIT (OUTPATIENT)
Dept: FAMILY MEDICINE | Facility: CLINIC | Age: 80
End: 2020-08-31
Payer: MEDICARE

## 2020-08-31 VITALS
HEIGHT: 66 IN | BODY MASS INDEX: 20.09 KG/M2 | SYSTOLIC BLOOD PRESSURE: 125 MMHG | DIASTOLIC BLOOD PRESSURE: 67 MMHG | TEMPERATURE: 98 F | HEART RATE: 84 BPM | WEIGHT: 125 LBS

## 2020-08-31 DIAGNOSIS — Z23 NEEDS FLU SHOT: ICD-10-CM

## 2020-08-31 DIAGNOSIS — I12.0 BENIGN HYPERTENSION WITH ESRD (END-STAGE RENAL DISEASE): ICD-10-CM

## 2020-08-31 DIAGNOSIS — E78.2 MULTIPLE-TYPE HYPERLIPIDEMIA: ICD-10-CM

## 2020-08-31 DIAGNOSIS — I25.118 CORONARY ARTERY DISEASE OF NATIVE HEART WITH STABLE ANGINA PECTORIS, UNSPECIFIED VESSEL OR LESION TYPE: ICD-10-CM

## 2020-08-31 DIAGNOSIS — I48.0 PAROXYSMAL ATRIAL FIBRILLATION: Primary | ICD-10-CM

## 2020-08-31 DIAGNOSIS — Z99.2 STAGE 5 CHRONIC KIDNEY DISEASE ON CHRONIC DIALYSIS: ICD-10-CM

## 2020-08-31 DIAGNOSIS — N18.6 STAGE 5 CHRONIC KIDNEY DISEASE ON CHRONIC DIALYSIS: ICD-10-CM

## 2020-08-31 DIAGNOSIS — N18.6 BENIGN HYPERTENSION WITH ESRD (END-STAGE RENAL DISEASE): ICD-10-CM

## 2020-08-31 PROCEDURE — 99214 OFFICE O/P EST MOD 30 MIN: CPT | Mod: 25,S$GLB,, | Performed by: INTERNAL MEDICINE

## 2020-08-31 PROCEDURE — G0008 ADMIN INFLUENZA VIRUS VAC: HCPCS | Mod: S$GLB,,, | Performed by: INTERNAL MEDICINE

## 2020-08-31 PROCEDURE — 1101F PT FALLS ASSESS-DOCD LE1/YR: CPT | Mod: S$GLB,,, | Performed by: INTERNAL MEDICINE

## 2020-08-31 PROCEDURE — 1126F PR PAIN SEVERITY QUANTIFIED, NO PAIN PRESENT: ICD-10-PCS | Mod: S$GLB,,, | Performed by: INTERNAL MEDICINE

## 2020-08-31 PROCEDURE — 90662 FLU VACCINE - QUADRIVALENT - HIGH DOSE (65+) PRESERVATIVE FREE IM: ICD-10-PCS | Mod: S$GLB,,, | Performed by: INTERNAL MEDICINE

## 2020-08-31 PROCEDURE — 90662 IIV NO PRSV INCREASED AG IM: CPT | Mod: S$GLB,,, | Performed by: INTERNAL MEDICINE

## 2020-08-31 PROCEDURE — 1101F PR PT FALLS ASSESS DOC 0-1 FALLS W/OUT INJ PAST YR: ICD-10-PCS | Mod: S$GLB,,, | Performed by: INTERNAL MEDICINE

## 2020-08-31 PROCEDURE — 99214 PR OFFICE/OUTPT VISIT, EST, LEVL IV, 30-39 MIN: ICD-10-PCS | Mod: 25,S$GLB,, | Performed by: INTERNAL MEDICINE

## 2020-08-31 PROCEDURE — 1159F MED LIST DOCD IN RCRD: CPT | Mod: S$GLB,,, | Performed by: INTERNAL MEDICINE

## 2020-08-31 PROCEDURE — G0008 FLU VACCINE - QUADRIVALENT - HIGH DOSE (65+) PRESERVATIVE FREE IM: ICD-10-PCS | Mod: S$GLB,,, | Performed by: INTERNAL MEDICINE

## 2020-08-31 PROCEDURE — 1159F PR MEDICATION LIST DOCUMENTED IN MEDICAL RECORD: ICD-10-PCS | Mod: S$GLB,,, | Performed by: INTERNAL MEDICINE

## 2020-08-31 PROCEDURE — 1126F AMNT PAIN NOTED NONE PRSNT: CPT | Mod: S$GLB,,, | Performed by: INTERNAL MEDICINE

## 2020-08-31 RX ORDER — AMLODIPINE BESYLATE 5 MG/1
5 TABLET ORAL DAILY
Status: ON HOLD | COMMUNITY
Start: 2020-08-03 | End: 2022-01-28 | Stop reason: HOSPADM

## 2020-08-31 NOTE — PATIENT INSTRUCTIONS
"  Facts About Dietary Fat     Olive oil is a good source of unsaturated fat.     Eating less saturated and trans fat is one of the best things you can do for your heart. Start by finding out which fats are better to use. Then always try to use as little "bad" fat as you can.  Why eat less fat?  · Cutting down on the fat you eat can lower your blood cholesterol levels. This may help prevent clogged arteries from buildup of plaque.  · A low-fat diet can help you lose excess weight. Doing so can lower your blood pressure and reduce your chances of getting diabetes.  · A low-fat diet reduces your risk for stroke and for some cancers.  Unsaturated fat is most healthy  · When you must add fat, use unsaturated fat.  · Unsaturated fats come from plants. They include olive, canola, peanut, corn, avocado, safflower, and sunflower oils.  · Liquid (squeezable) margarine is also mostly unsaturated fat.  · In moderate amounts, unsaturated fat can even be good for your heart.  Saturated fat is less healthy  · Avoid eating saturated fat because it raises your blood cholesterol levels.  · Most saturated fat comes from animals. Foods such as butter, lard, cheese, cream, whole milk, and fatty cuts of meat are high in saturated fat.  · Some oils, such as palm and coconut oils, are also saturated fats.  Trans fat is least healthy  · Also avoid trans fat whenever possible. Even if it's not listed on the food label, look for it in the ingredients in the form of hydrogenated or partially hydrogenated oils.  · This is found in snack foods, shortening, french fries, and stick margarines.  Add flavor without fat  · Sprinkle herbs on fish, chicken, and meat, and in soups.  · Try herbs, lemon juice, or flavored vinegar on vegetables.  · Add chopped onions, garlic, and peppers to flavor beans and rice.   Date Last Reviewed: 5/11/2015  © 8876-0637 [employer]. [address] [place] [address], [place] [address]. All rights reserved. This " information is not intended as a substitute for professional medical care. Always follow your healthcare professional's instructions.

## 2020-08-31 NOTE — PROGRESS NOTES
Subjective:       Patient ID: Tyra Isaac is a 80 y.o. female.    Chief Complaint: Hypertension, Hyperlipidemia, Chronic Kidney Disease, and Atrial Fibrillation    Miss Isaac is a 80-year-old female who comes for follow-up.    Underlying issues of chronic kidney disease on hemodialysis, chronic anticoagulation have been noted.  Patient did have a fistula repair surgery done by Dr. Vasquez sometimes in past.  Is working well and she is going through dialysis 3 times a week.  She is taking her anticoagulation also.    Today her blood pressures are on the low normal side..  Historically she gets low blood pressure readings whenever she goes through dialysis assuming that her blood pressures have been permitted to ride a little higher.    She denies any chest pains.  She denies any shortness of breath or headaches.  Appetite is fair.  Cognition is reasonable.  Sleep is okay.  No incontinence symptoms.    Underlying atrial fibrillation also has been noted for which she is on chronic anticoagulation.    She is accompanied with her son.    Hypertension  This is a chronic problem. The current episode started more than 1 year ago. The problem is controlled. Associated symptoms include malaise/fatigue. Pertinent negatives include no chest pain, palpitations, peripheral edema or shortness of breath. Risk factors for coronary artery disease include sedentary lifestyle, dyslipidemia and post-menopausal state. Past treatments include calcium channel blockers. The current treatment provides moderate improvement. Compliance problems include psychosocial issues.  Hypertensive end-organ damage includes kidney disease and CAD/MI. Identifiable causes of hypertension include chronic renal disease.   Hyperlipidemia  This is a chronic problem. The current episode started more than 1 year ago. The problem is controlled. Exacerbating diseases include chronic renal disease. She has no history of hypothyroidism or obesity.  Pertinent negatives include no chest pain, myalgias or shortness of breath. Current antihyperlipidemic treatment includes statins. The current treatment provides moderate improvement of lipids. Risk factors for coronary artery disease include a sedentary lifestyle and dyslipidemia.   Atrial Fibrillation  Presents for follow-up visit. Symptoms include hypertension. Symptoms are negative for bradycardia, chest pain, dizziness, hemodynamic instability, pacemaker problem, palpitations, shortness of breath, syncope, tachycardia and weakness. The symptoms have been stable. Past medical history includes atrial fibrillation and hyperlipidemia. Medication compliance problems include psychosocial issues.       Past Medical History:   Diagnosis Date    A-fib     Anxiety     Depression     Disorder of kidney and ureter     Encephalopathy acute 1/1/2018    End stage kidney disease 6/17/2017    Gout     Hyperlipidemia     Hypertension     Moderate episode of recurrent major depressive disorder 1/17/2018    Nephropathy hypertensive, stage 5 chronic kidney disease or end stage renal disease 6/17/2017    Osteopenia of multiple sites 3/9/2018    Based upon bone density measurements. Patient also has chronic kidney disease.    Stroke 11/2016     Social History     Socioeconomic History    Marital status:      Spouse name: Aria Isaac    Number of children: 3    Years of education: Not on file    Highest education level: Not on file   Occupational History    Occupation: Not working   Social Needs    Financial resource strain: Not on file    Food insecurity     Worry: Not on file     Inability: Not on file    Transportation needs     Medical: Not on file     Non-medical: Not on file   Tobacco Use    Smoking status: Never Smoker    Smokeless tobacco: Never Used   Substance and Sexual Activity    Alcohol use: No    Drug use: No    Sexual activity: Not Currently   Lifestyle    Physical activity      Days per week: Not on file     Minutes per session: Not on file    Stress: Not at all   Relationships    Social connections     Talks on phone: Not on file     Gets together: Not on file     Attends Yarsanism service: Not on file     Active member of club or organization: Not on file     Attends meetings of clubs or organizations: Not on file     Relationship status: Not on file   Other Topics Concern    Not on file   Social History Narrative    Not on file     Past Surgical History:   Procedure Laterality Date    CARDIAC SURGERY      stents    WRIST SURGERY       Family History   Problem Relation Age of Onset    Heart disease Mother     Cancer Father        Review of Systems   Constitutional: Positive for fatigue (Improving.) and malaise/fatigue. Negative for activity change, appetite change (improving...), chills and fever. Unexpected weight change: lost 20-25 pounds post dialysis.   HENT: Negative for congestion, postnasal drip, sinus pressure and voice change (stuttering).    Eyes: Negative for pain, discharge and visual disturbance.   Respiratory: Negative for cough, chest tightness and shortness of breath.    Cardiovascular: Negative for chest pain, palpitations, leg swelling and syncope.        A fib on anticoagulation.  Patient has a functional AV fistula on the right side.   Gastrointestinal: Negative for abdominal distention, abdominal pain, constipation and vomiting.   Endocrine: Negative for polydipsia and polyphagia.   Genitourinary: Negative for difficulty urinating and dysuria.        Patient does make some urine spontaneously.on Hemodialysis   Musculoskeletal: Negative for arthralgias, joint swelling and myalgias.   Skin: Negative for color change, pallor and rash.   Neurological: Positive for numbness. Negative for dizziness, tremors, seizures, syncope and weakness.        She complains of numbness in the right hand.   Psychiatric/Behavioral: Negative for agitation, confusion and dysphoric  "mood. The patient is not nervous/anxious.         Expected age-related cognitive decline.         Objective:      Blood pressure 125/67, pulse 84, temperature 97.9 °F (36.6 °C), height 5' 6" (1.676 m), weight 56.7 kg (125 lb). Body mass index is 20.18 kg/m².  Physical Exam  Vitals signs and nursing note reviewed.   Constitutional:       General: She is not in acute distress.     Appearance: She is well-developed. She is ill-appearing. She is not toxic-appearing or diaphoretic.      Comments: Stable mentation.  Wearing a mask.   HENT:      Head: Normocephalic and atraumatic.      Mouth/Throat:      Pharynx: No oropharyngeal exudate.   Eyes:      General: Lids are normal. Lids are everted, no foreign bodies appreciated. No scleral icterus.        Right eye: No discharge.         Left eye: No discharge.      Conjunctiva/sclera: Conjunctivae normal.      Pupils:      Right eye: Pupil is round and reactive.      Left eye: Pupil is round and reactive.   Neck:      Musculoskeletal: Normal range of motion and neck supple.      Thyroid: No thyromegaly.      Vascular: No JVD.      Trachea: Trachea normal. No tracheal deviation.   Cardiovascular:      Rate and Rhythm: Normal rate and regular rhythm.      Heart sounds: S1 normal and S2 normal. Murmur present. Systolic murmur present with a grade of 2/6. No friction rub.      Comments: Dr Thompson Cardiologist  Pulmonary:      Effort: No respiratory distress.      Breath sounds: Normal breath sounds.   Abdominal:      General: Bowel sounds are normal.      Palpations: Abdomen is soft. Abdomen is not rigid.      Tenderness: There is no guarding.   Musculoskeletal:         General: No tenderness or deformity.        Arms:       Right lower leg: No edema.      Left lower leg: No edema.   Lymphadenopathy:      Cervical: No cervical adenopathy.   Skin:     General: Skin is warm and dry.      Coloration: Skin is pale.      Findings: No bruising or rash.      Comments: Rt arm AV shunt " with thrill   Neurological:      Mental Status: She is alert. Mental status is at baseline.      Coordination: Coordination normal.   Psychiatric:         Mood and Affect: Affect is not labile.         Speech: Speech normal.         Behavior: Behavior normal. Behavior is cooperative.         Judgment: Judgment is not inappropriate.           Assessment:       1. Paroxysmal atrial fibrillation    2. Benign hypertension with ESRD (end-stage renal disease)    3. Coronary artery disease of native heart with stable angina pectoris, unspecified vessel or lesion type    4. Multiple-type hyperlipidemia    5. Stage 5 chronic kidney disease on chronic dialysis    6. Needs flu shot           No visits with results within 3 Month(s) from this visit.   Latest known visit with results is:   Admission on 12/13/2019, Discharged on 12/14/2019   Component Date Value Ref Range Status    WBC 12/13/2019 5.38  3.90 - 12.70 K/uL Final    RBC 12/13/2019 3.54* 4.00 - 5.40 M/uL Final    Hemoglobin 12/13/2019 10.6* 12.0 - 16.0 g/dL Final    Hematocrit 12/13/2019 34.0* 37.0 - 48.5 % Final    Mean Corpuscular Volume 12/13/2019 96  82 - 98 fL Final    Mean Corpuscular Hemoglobin 12/13/2019 29.9  27.0 - 31.0 pg Final    Mean Corpuscular Hemoglobin Conc 12/13/2019 31.2* 32.0 - 36.0 g/dL Final    RDW 12/13/2019 13.9  11.5 - 14.5 % Final    Platelets 12/13/2019 226  150 - 350 K/uL Final    MPV 12/13/2019 9.6  9.2 - 12.9 fL Final    Immature Granulocytes 12/13/2019 0.4  0.0 - 0.5 % Final    Gran # (ANC) 12/13/2019 2.4  1.8 - 7.7 K/uL Final    Immature Grans (Abs) 12/13/2019 0.02  0.00 - 0.04 K/uL Final    Lymph # 12/13/2019 1.7  1.0 - 4.8 K/uL Final    Mono # 12/13/2019 0.9  0.3 - 1.0 K/uL Final    Eos # 12/13/2019 0.4  0.0 - 0.5 K/uL Final    Baso # 12/13/2019 0.03  0.00 - 0.20 K/uL Final    nRBC 12/13/2019 1* 0 /100 WBC Final    Gran% 12/13/2019 45.3  38.0 - 73.0 % Final    Lymph% 12/13/2019 31.2  18.0 - 48.0 % Final    Mono%  12/13/2019 16.0* 4.0 - 15.0 % Final    Eosinophil% 12/13/2019 6.5  0.0 - 8.0 % Final    Basophil% 12/13/2019 0.6  0.0 - 1.9 % Final    Differential Method 12/13/2019 Automated   Final    Sodium 12/13/2019 138  136 - 145 mmol/L Final    Potassium 12/13/2019 3.2* 3.5 - 5.1 mmol/L Final    Chloride 12/13/2019 94* 95 - 110 mmol/L Final    CO2 12/13/2019 35* 22 - 31 mmol/L Final    Glucose 12/13/2019 96  70 - 110 mg/dL Final    BUN, Bld 12/13/2019 14  7 - 18 mg/dL Final    Creatinine 12/13/2019 4.43* 0.50 - 1.40 mg/dL Final    Calcium 12/13/2019 8.7  8.4 - 10.2 mg/dL Final    Total Protein 12/13/2019 6.9  6.0 - 8.4 g/dL Final    Albumin 12/13/2019 4.1  3.5 - 5.2 g/dL Final    Total Bilirubin 12/13/2019 0.7  0.2 - 1.3 mg/dL Final    Alkaline Phosphatase 12/13/2019 71  38 - 145 U/L Final    AST 12/13/2019 27  14 - 36 U/L Final    ALT 12/13/2019 28  10 - 44 U/L Final    Anion Gap 12/13/2019 9  8 - 16 mmol/L Final    eGFR if African American 12/13/2019 10* >60 mL/min/1.73 m^2 Final    eGFR if non African American 12/13/2019 9* >60 mL/min/1.73 m^2 Final    Troponin I 12/13/2019 0.017  0.012 - 0.034 ng/mL Final    POC PH 12/13/2019 7.51* 7.35 - 7.45 Final    POC PCO2 12/13/2019 43  35.0 - 45.0 mmHg Final    POC PO2 12/13/2019 90  80.0 - 100.0 mmHg Final    POC tHb 12/13/2019 9.8* 12.0 - 18.0 g/dL Final    POC O2Hb 12/13/2019 97.2  94.0 - 100.0 % Final    POC COHb 12/13/2019 0.7  <1.6 % Final    POC MetHb 12/13/2019 0.0  <3.0 % Final    POC SATURATED O2 12/13/2019 97.9  94.0 - 100.0 % Final    POC pH Temp 12/13/2019 7.51* 7.35 - 7.45 Final    POC pCO2 Temp 12/13/2019 43  35.0 - 45.0 mmHg Final    POC pO2 Temp 12/13/2019 90  80.0 - 100.0 mmHg Final    POC Temp 12/13/2019 37.0  C Final    Mode #1 12/13/2019 -   Final    O2 Device #1 12/13/2019 -   Final    O2 Device #2 12/13/2019 -   Final    FiO2 12/13/2019 -  % Final    Mech VT 12/13/2019 -  mL Final    Mech Rate (BPM) 12/13/2019 -  bpm  Final    PiP 12/13/2019 -  cm H2O Final    PEEP 12/13/2019 -  cm H2O Final    Pressure Support 12/13/2019 -  cm H2O Final    Pressure Control 12/13/2019 -  cm H2O Final    Pulse Ox 12/13/2019 -  % Final    IPAP 12/13/2019 -  cm H2O Final    EPAP 12/13/2019 -  cm H2O Final    Flow 12/13/2019 -  LPM Final    Frequency 12/13/2019 -  Hz Final    I Time 12/13/2019 -   Final    Nitric 12/13/2019 -  PPM Final    PAW 12/13/2019 -  cmH2O Final    POC BE(B) 12/13/2019 10.1* -2.0 - 2.0 mmol/L Final    POC pAO2 12/13/2019 CANCELED  mmHg Final    POC HCO3-(c) 12/13/2019 34.3* 22.0 - 26.0 mmol/L Final    POC A-aDO2 12/13/2019 CANCELED  mmHg Final    Date of Draw 12/13/2019 [id number]   Final    Time of Draw 12/13/2019 1235   Final    Allens Test 12/13/2019 NA   Final    Analyzed by: 12/13/2019 [facility]   Final    Drawn by: 12/13/2019 [facility]   Final    Sample Site 12/13/2019 LBA   Final    PT 12/13/2019 22.7* 11.8 - 14.7 sec Final    INR 12/13/2019 2.2   Final    LVIDS 12/13/2019 3.00  2.1 - 4.0 cm Final    Ascending aorta 12/13/2019 2.9  cm Final    AV mean gradient 12/13/2019 4  mmHg Final    Ao peak romeo 12/13/2019 1.56  m/s Final    Ao VTI 12/13/2019 37.7  cm Final    IVS 12/13/2019 1.32* 0.6 - 1.1 cm Final    LA size 12/13/2019 3.4  cm Final    LVIDD 12/13/2019 3.67  3.5 - 6.0 cm Final    LVOT diameter 12/13/2019 2.4  cm Final    LVOT peak VTI 12/13/2019 29.00  cm Final    PW 12/13/2019 1.22* 0.6 - 1.1 cm Final    MV Peak A Romeo 12/13/2019 1.27  m/s Final    E wave decelartion time 12/13/2019 299  msec Final    RA Major Axis 12/13/2019 4.42  cm Final    RA Width 12/13/2019 2.96  cm Final    TR Max Romeo 12/13/2019 2.93  m/s Final    LA WIDTH 12/13/2019 3.49  cm Final    PV PEAK VELOCITY 12/13/2019 92.3  cm/s Final    LVOT peak romeo 12/13/2019 111.00  m/s Final    BSA 12/13/2019 1.63  m2 Final    TDI SEPTAL 12/13/2019 0.05  m/s Final    LV LATERAL E/E' RATIO 12/13/2019 16.25  m/s Final    LV  SEPTAL E/E' RATIO 12/13/2019 26.00  m/s Final    TDI LATERAL 12/13/2019 0.08  m/s Final    FS 12/13/2019 18  28 - 44 % Final    LA volume 12/13/2019 44.88  cm3 Final    LV mass 12/13/2019 158.72  g Final    Left Ventricle Relative Wall Thick* 12/13/2019 0.66  cm Final    AV valve area 12/13/2019 3.48  cm2 Final    AV Velocity Ratio 12/13/2019 71.15   Final    AV index (prosthetic) 12/13/2019 0.77   Final    MV valve area p 1/2 method 12/13/2019 2.34  cm2 Final    E/A ratio 12/13/2019 1.02   Final    Mean e' 12/13/2019 0.07  m/s Final    LVOT area 12/13/2019 4.5  cm2 Final    LVOT stroke volume 12/13/2019 131.13  cm3 Final    AV peak gradient 12/13/2019 10  mmHg Final    E/E' ratio 12/13/2019 20.00  m/s Final    MV Peak E Romeo 12/13/2019 1.30  m/s Final    MV stenosis pressure 1/2 time 12/13/2019 94  ms Final    LA Volume Index 12/13/2019 27.3  mL/m2 Final    LV Mass Index 12/13/2019 97  g/m2 Final    Left Atrium Minor Axis 12/13/2019 4.45  cm Final    Left Atrium Major Axis 12/13/2019 4.45  cm Final    Triscuspid Valve Regurgitation Pea* 12/13/2019 34  mmHg Final    Right Atrial Pressure (from IVC) 12/13/2019 3  mmHg Final    TV rest pulmonary artery pressure 12/13/2019 37  mmHg Final    Magnesium 12/13/2019 2.0  1.6 - 2.6 mg/dL Final    POCT Glucose 12/13/2019 90  70 - 110 mg/dL Final    Sodium 12/14/2019 134* 136 - 145 mmol/L Final    Potassium 12/14/2019 4.5  3.5 - 5.1 mmol/L Final    Chloride 12/14/2019 97  95 - 110 mmol/L Final    CO2 12/14/2019 31  22 - 31 mmol/L Final    Glucose 12/14/2019 95  70 - 110 mg/dL Final    BUN, Bld 12/14/2019 23* 7 - 18 mg/dL Final    Creatinine 12/14/2019 5.97* 0.50 - 1.40 mg/dL Final    Calcium 12/14/2019 8.3* 8.4 - 10.2 mg/dL Final    Anion Gap 12/14/2019 6* 8 - 16 mmol/L Final    eGFR if  12/14/2019 7* >60 mL/min/1.73 m^2 Final    eGFR if non African American 12/14/2019 6* >60 mL/min/1.73 m^2 Final    WBC 12/14/2019 4.42   3.90 - 12.70 K/uL Final    RBC 12/14/2019 3.50* 4.00 - 5.40 M/uL Final    Hemoglobin 12/14/2019 10.6* 12.0 - 16.0 g/dL Final    Hematocrit 12/14/2019 33.9* 37.0 - 48.5 % Final    Mean Corpuscular Volume 12/14/2019 97  82 - 98 fL Final    Mean Corpuscular Hemoglobin 12/14/2019 30.3  27.0 - 31.0 pg Final    Mean Corpuscular Hemoglobin Conc 12/14/2019 31.3* 32.0 - 36.0 g/dL Final    RDW 12/14/2019 14.3  11.5 - 14.5 % Final    Platelets 12/14/2019 156  150 - 350 K/uL Final    MPV 12/14/2019 10.0  9.2 - 12.9 fL Final    Immature Granulocytes 12/14/2019 0.5  0.0 - 0.5 % Final    Gran # (ANC) 12/14/2019 2.1  1.8 - 7.7 K/uL Final    Immature Grans (Abs) 12/14/2019 0.02  0.00 - 0.04 K/uL Final    Lymph # 12/14/2019 1.2  1.0 - 4.8 K/uL Final    Mono # 12/14/2019 0.8  0.3 - 1.0 K/uL Final    Eos # 12/14/2019 0.3  0.0 - 0.5 K/uL Final    Baso # 12/14/2019 0.01  0.00 - 0.20 K/uL Final    nRBC 12/14/2019 0  0 /100 WBC Final    Gran% 12/14/2019 47.1  38.0 - 73.0 % Final    Lymph% 12/14/2019 27.8  18.0 - 48.0 % Final    Mono% 12/14/2019 17.4* 4.0 - 15.0 % Final    Eosinophil% 12/14/2019 7.0  0.0 - 8.0 % Final    Basophil% 12/14/2019 0.2  0.0 - 1.9 % Final    Differential Method 12/14/2019 Automated   Final    Glucose 12/14/2019 95  70 - 110 mg/dL Final    Sodium 12/14/2019 134* 136 - 145 mmol/L Final    Potassium 12/14/2019 4.5  3.5 - 5.1 mmol/L Final    Chloride 12/14/2019 97  95 - 110 mmol/L Final    CO2 12/14/2019 31  22 - 31 mmol/L Final    BUN, Bld 12/14/2019 23* 7 - 18 mg/dL Final    Calcium 12/14/2019 8.3* 8.4 - 10.2 mg/dL Final    Creatinine 12/14/2019 5.97* 0.50 - 1.40 mg/dL Final    Albumin 12/14/2019 3.5  3.5 - 5.2 g/dL Final    Phosphorus 12/14/2019 3.2  2.7 - 4.5 mg/dL Final    eGFR if  12/14/2019 7* >60 mL/min/1.73 m^2 Final    eGFR if non African American 12/14/2019 6* >60 mL/min/1.73 m^2 Final    Anion Gap 12/14/2019 6* 8 - 16 mmol/L Final         Plan:            Paroxysmal atrial fibrillation    Benign hypertension with ESRD (end-stage renal disease)    Coronary artery disease of native heart with stable angina pectoris, unspecified vessel or lesion type    Multiple-type hyperlipidemia    Stage 5 chronic kidney disease on chronic dialysis    Needs flu shot  -     Influenza - Quadrivalent - High Dose (65+) (PF) (IM)     She is also on anticoagulation with warfarin.  This is being followed by Dr. Thompson her current cardiologist.  I have discussed her about being cautious on this medication because this is a blood thinner.  Any injury could be bad.    She continues on dialysis 3 times a week on Monday, Wednesday and Friday.  After dialysis her blood pressures are slightly on the lower side and she feels fatigue.  Prior to dialysis she feels the best.    She is utilizing the COVID-19 precautions and trying to avoid going and lesvia its or in crowds.      Advised Ms. Isaac about age and season appropriate immunizations/ cancer screenings.  Also seasonal influenza vaccine, update on tetanus diphtheria vaccination every 10 years.    Patient continues to watch her diet and her son does the cooking any makes sure that she does not get any fatty, greasy or non recommended food.    I have also discussed about mammogram and she will do it in November.      Today she will be updated again on influenza vaccination.  Fup 4 months    Spent niesha 25 minutes with patient which involved review of pts medical conditions, labs, medications and with 50% of time face-to-face discussion about medical problems, management and any applicable changes.      Current Outpatient Medications:     amLODIPine (NORVASC) 5 MG tablet, Take 5 mg by mouth once daily., Disp: , Rfl:     atorvastatin (LIPITOR) 40 MG tablet, Take 40 mg by mouth once daily., Disp: , Rfl:     b complex vitamins tablet, Take 1 tablet by mouth once daily., Disp: , Rfl:     calcium acetate (PHOSLO) 667 mg capsule, Take 667 mg by  mouth 2 (two) times daily., Disp: , Rfl: 6    docusate sodium (COLACE) 100 MG capsule, Take 100 mg by mouth once daily. , Disp: , Rfl:     dorzolamide-timolol 2-0.5% (COSOPT) 22.3-6.8 mg/mL ophthalmic solution, Place 1 drop into both eyes 2 (two) times daily. , Disp: , Rfl:     latanoprost 0.005 % ophthalmic solution, Place 1 drop into both eyes every evening. , Disp: , Rfl:     lidocaine 5 % Crea, Apply 1 application topically every Mon, Wed, Fri. , Disp: , Rfl:     warfarin (COUMADIN) 3 MG tablet, Take 1.5 mg by mouth every Tues, Thurs. , Disp: , Rfl:     warfarin (COUMADIN) 3 MG tablet, Take 3 mg by mouth every Mon, Wed, Fri, Sun., Disp: , Rfl:

## 2020-12-31 ENCOUNTER — OFFICE VISIT (OUTPATIENT)
Dept: FAMILY MEDICINE | Facility: CLINIC | Age: 80
End: 2020-12-31
Payer: MEDICARE

## 2020-12-31 VITALS
BODY MASS INDEX: 20.41 KG/M2 | HEIGHT: 66 IN | SYSTOLIC BLOOD PRESSURE: 139 MMHG | HEART RATE: 92 BPM | DIASTOLIC BLOOD PRESSURE: 67 MMHG | WEIGHT: 127 LBS | TEMPERATURE: 98 F | OXYGEN SATURATION: 98 %

## 2020-12-31 DIAGNOSIS — I25.118 CORONARY ARTERY DISEASE OF NATIVE HEART WITH STABLE ANGINA PECTORIS, UNSPECIFIED VESSEL OR LESION TYPE: ICD-10-CM

## 2020-12-31 DIAGNOSIS — N18.6 BENIGN HYPERTENSION WITH ESRD (END-STAGE RENAL DISEASE): Chronic | ICD-10-CM

## 2020-12-31 DIAGNOSIS — Z99.2 STAGE 5 CHRONIC KIDNEY DISEASE ON CHRONIC DIALYSIS: Chronic | ICD-10-CM

## 2020-12-31 DIAGNOSIS — I48.0 PAROXYSMAL ATRIAL FIBRILLATION: Primary | ICD-10-CM

## 2020-12-31 DIAGNOSIS — N18.6 STAGE 5 CHRONIC KIDNEY DISEASE ON CHRONIC DIALYSIS: Chronic | ICD-10-CM

## 2020-12-31 DIAGNOSIS — E78.2 MULTIPLE-TYPE HYPERLIPIDEMIA: ICD-10-CM

## 2020-12-31 DIAGNOSIS — I12.0 BENIGN HYPERTENSION WITH ESRD (END-STAGE RENAL DISEASE): Chronic | ICD-10-CM

## 2020-12-31 PROCEDURE — 1159F PR MEDICATION LIST DOCUMENTED IN MEDICAL RECORD: ICD-10-PCS | Mod: S$GLB,,, | Performed by: INTERNAL MEDICINE

## 2020-12-31 PROCEDURE — 99214 PR OFFICE/OUTPT VISIT, EST, LEVL IV, 30-39 MIN: ICD-10-PCS | Mod: S$GLB,,, | Performed by: INTERNAL MEDICINE

## 2020-12-31 PROCEDURE — 1126F PR PAIN SEVERITY QUANTIFIED, NO PAIN PRESENT: ICD-10-PCS | Mod: S$GLB,,, | Performed by: INTERNAL MEDICINE

## 2020-12-31 PROCEDURE — 1126F AMNT PAIN NOTED NONE PRSNT: CPT | Mod: S$GLB,,, | Performed by: INTERNAL MEDICINE

## 2020-12-31 PROCEDURE — 1159F MED LIST DOCD IN RCRD: CPT | Mod: S$GLB,,, | Performed by: INTERNAL MEDICINE

## 2020-12-31 PROCEDURE — 99214 OFFICE O/P EST MOD 30 MIN: CPT | Mod: S$GLB,,, | Performed by: INTERNAL MEDICINE

## 2020-12-31 NOTE — PATIENT INSTRUCTIONS
Discharge Instructions for Atrial Fibrillation  You have been diagnosed with an abnormal heart rhythm called atrial fibrillation. With this condition, your hearts 2 upper chambers quiver rather than squeeze the blood out in a normal pattern. This leads to an irregular and sometimes rapid heartbeat. Some people will develop associated symptoms such as a flip-flopping heartbeat, chest pain, lightheadedness, or shortness of breath. Other people may have no symptoms at all. Atrial fibrillation is serious because it affects the hearts ability to fill with blood as it should. Blood clots may form. This increases the risk for stroke. Untreated atrial fibrillation can also lead to heart failure. Atrial fibrillation can be controlled. With treatment, most people with atrial fibrillation lead normal lives.  Treatment options  Recommended treatment for atrial fibrillation depends on your age, symptoms, how long you have had atrial fibrillation, and other factors. You will have a complete evaluation to find out if you have any abnormalities that caused your heart to go into atrial fibrillation. This might be blocked heart arteries or a thyroid problem. Your doctor will assess your particular case and discuss choices with you.  Treatment choices may include:  · Treating an underlying disorder that puts you at risk for atrial fibrillation. For example, correcting an abnormal thyroid or electrolyte problem, or treating a blocked heart artery.  · Restoring a normal heart rhythm with an electrical shock (cardioversion) or with an antiarrhythmic medicine (chemical cardioversion)  · Using medicine to control your heart rate in atrial fibrillation.  · Preventing the risk for blood clot and stroke using blood-thinning medicines. Your doctor will tell you what he or she recommends. Choices may include aspirin, clopidogrel, warfarin, dabigatran, rivaroxaban, apixaban, and edoxaban.  · Doing catheter ablation or a surgical maze  procedure. These use different methods to destroy certain areas of heart tissue. This interrupts the electrical signals causing atrial fibrillation. One of these procedures may be a choice when medicines do not work, or as an alternative to long-term medicine.  · Other treatment choices may be recommended for you by your doctor.  Managing risk factors for stroke and preventing heart failure are important parts of any treatment plan for atrial fibrillation.  Home care  · Take your medicines exactly as directed. Dont skip doses.  · Work with your doctor to find the right medicines and doses for you.  · Learn to take your own pulse. Keep a record of your results. Ask your doctor which pulse rates mean that you need medical attention. Slowing your pulse is often the goal of treatment. Ask your doctor if its OK for you to use an automatic machine to check your pulse at home. Sometimes these machines dont count the pulse correctly when you have atrial fibrillation.  · Limit your intake of coffee, tea, cola, and other beverages with caffeine. Talk with your doctor about whether you should eliminate caffeine.  · Avoid over-the-counter medicines that have caffeine in them.  · Let your doctor know what medicines you take, including prescription and over-the-counter medicines, as well as any supplements. They interfere with some medicines given for atrial fibrillation.  · Ask your doctor about whether you can drink alcohol. Some people need to avoid alcohol to better treat atrial fibrillation. If you are taking blood-thinner medicines, alcohol may interfere with them by increasing their effect.  · Never take stimulants such as amphetamines or cocaine. These drugs can speed up your heart rate and trigger atrial fibrillation.  Follow-up care  Follow up with your doctor, or as advised.     When should I call my healthcare provider  Call your healthcare provider right away if you have any of the  following:  · Weakness  · Dizziness  · Fainting  · Fatigue  · Shortness of breath  · Chest pain with increased activity  · A change in the usual regularity of your heartbeat, or an unusually fast heartbeat   Date Last Reviewed: 4/23/2016  © 1244-1747 [employer]. [address] [place] [address], [place] [address]. All rights reserved. This information is not intended as a substitute for professional medical care. Always follow your healthcare professional's instructions.

## 2020-12-31 NOTE — PROGRESS NOTES
"Subjective:       Patient ID: Tyra Isaac is a 80 y.o. female.    Chief Complaint: Hyperlipidemia, Hypertension, Chronic Kidney Disease, and Anticoagulation    Miss Isaac is a 80-year-old female who comes for follow-up.      Not a very astute historian and does not recall the details but after review of chart it seems that she had to have a revision procedure for shunt by Dr. Vasquez and she passed out in the office.  She had a dialysis at that time which was left incomplete because of passing out.  Not sure when she had the dialysis.  She was taken into the a emergency Department at [facility] that she was found to have atrial fibrillation with rapid ventricular response.    Upon review of records it seems that she has had episodes of low blood pressure during dialysis probably from over dialysis.  It is difficult to adjust her weight as per winter clothing.    Otherwise she is doing okay at this point.    She is taking her anticoagulation.    Blood pressures are under control and in fact on the low side.  No major bleeding issues.  She is trying to keep safe from the COVID-19 though some family members were positive for COVID-19 including grandchildren.    History and physical done at the hospital has been copied and pasted ad verbatim here without any editing done by me.  This is to facilitate easy reference.    HPI: 80yo AAF with ESRD, h/o CVA on coumadin, CAD, and HLD presented to the ED from Dr. Vasquez's office for a syncopal episode. The patient underwent dialysis in [place] this morning. She became hypotensive during dialysis prompting the unit to stop her dialysis. She reports of occasional intradialytic hypotension and states her dry weight has been adjusted due to "winter clothing" but cannot give me specifics. After dialysis she continued to have bleeding from her fistula, therefore the patient was sent to Dr. Vasquez's office for evaluation. While at Dr. Vasquez's office the " patient became unresponsive and EMS was called. When they arrived, EMS noted the patient to be in AFib with RVR and the patient was shocked. However she continued to be hypotensive and tachycardic and was brought to the ED. In the ED she was given a fluid bolus and noted to intermittent go in and out of sinus rhythm. Cardiology was contacted who recommended more IV fluids and observation overnight. [facility] consulted for admission.    Hospital Course:   Patient admitted with afib with rvr. Patient placed on telemetry and cardiology consulted. Patient hydrated with improvement in heart rate. No further workup per cardiology. Patient was eventually given close follow up at discharge. Patient instructed to closely monitor volume status as this felt to be significant contributor to patients atrial fibrillation. Patient eventually discharged home with close follow up and plans for patient to see vascular surgery for further evaluation of fistula.       Consults:   Consults (From admission, onward)        Status Ordering Provider        Inpatient consult to Cardiology  Once    Provider:  Elijah Hess III, MD   Completed STEFAN NEAL        Inpatient consult to Nephrology  Once    Provider:  Jose Rolle MD   Completed STEFAN NEAL          No new Assessment & Plan notes have been filed under this hospital service since the last note was generated.  Service: Hospital Medicine    Final Active Diagnoses:    Diagnosis Date Noted POA   PRINCIPAL PROBLEM:  Atrial fibrillation with rapid ventricular response (I48.91) 12/13/2019 Yes   ESRD (end stage renal disease) (N18.6) 12/13/2019 Yes   Long term (current) use of anticoagulants (Z79.01) 12/13/2019 Not Applicable   Hypokalemia (E87.6) 12/13/2019 Yes   Coronary artery disease of native heart with stable angina pectoris (I25.118) 02/14/2019 Yes     Problems Resolved During this Admission:        Hyperlipidemia  This is a chronic problem. The current episode  started more than 1 year ago. The problem is controlled. Exacerbating diseases include chronic renal disease. She has no history of hypothyroidism or obesity. Pertinent negatives include no chest pain, myalgias or shortness of breath. Current antihyperlipidemic treatment includes statins. The current treatment provides moderate improvement of lipids. Risk factors for coronary artery disease include a sedentary lifestyle and dyslipidemia.   Hypertension  This is a chronic problem. The current episode started more than 1 year ago. The problem is controlled. Associated symptoms include malaise/fatigue. Pertinent negatives include no chest pain, palpitations, peripheral edema or shortness of breath. Risk factors for coronary artery disease include sedentary lifestyle, dyslipidemia and post-menopausal state. Past treatments include calcium channel blockers. The current treatment provides moderate improvement. Compliance problems include psychosocial issues.  Hypertensive end-organ damage includes kidney disease and CAD/MI. Identifiable causes of hypertension include chronic renal disease.   Atrial Fibrillation  Presents for follow-up visit. Symptoms include hypertension. Symptoms are negative for bradycardia, chest pain, dizziness, hemodynamic instability, pacemaker problem, palpitations, shortness of breath, syncope, tachycardia and weakness. The symptoms have been stable. Past medical history includes atrial fibrillation and hyperlipidemia. Medication compliance problems include psychosocial issues.       Past Medical History:   Diagnosis Date    A-fib     Anxiety     Depression     Disorder of kidney and ureter     Encephalopathy acute 1/1/2018    End stage kidney disease 6/17/2017    Gout     Hyperlipidemia     Hypertension     Moderate episode of recurrent major depressive disorder 1/17/2018    Nephropathy hypertensive, stage 5 chronic kidney disease or end stage renal disease 6/17/2017    Osteopenia of  multiple sites 3/9/2018    Based upon bone density measurements. Patient also has chronic kidney disease.    Stroke 11/2016     Social History     Socioeconomic History    Marital status:      Spouse name: Aria Isaac    Number of children: 3    Years of education: Not on file    Highest education level: Not on file   Occupational History    Occupation: Not working   Social Needs    Financial resource strain: Not on file    Food insecurity     Worry: Not on file     Inability: Not on file    Transportation needs     Medical: Not on file     Non-medical: Not on file   Tobacco Use    Smoking status: Never Smoker    Smokeless tobacco: Never Used   Substance and Sexual Activity    Alcohol use: No    Drug use: No    Sexual activity: Not Currently   Lifestyle    Physical activity     Days per week: Not on file     Minutes per session: Not on file    Stress: Not at all   Relationships    Social connections     Talks on phone: Not on file     Gets together: Not on file     Attends [employer] service: Not on file     Active member of club or organization: Not on file     Attends meetings of clubs or organizations: Not on file     Relationship status: Not on file   Other Topics Concern    Not on file   Social History Narrative    Not on file     Past Surgical History:   Procedure Laterality Date    CARDIAC SURGERY      stents    WRIST SURGERY       Family History   Problem Relation Age of Onset    Heart disease Mother     Cancer Father        Review of Systems   Constitutional: Positive for fatigue (Improving.) and malaise/fatigue. Negative for activity change, appetite change (improving...), chills and fever. Unexpected weight change: lost 20-25 pounds post dialysis.   HENT: Negative for congestion, postnasal drip, sinus pressure and voice change (stuttering).    Eyes: Negative for pain, discharge and visual disturbance.   Respiratory: Negative for cough, chest tightness and shortness of  "breath.    Cardiovascular: Negative for chest pain, palpitations, leg swelling and syncope.        A fib on anticoagulation.  Patient has a functional AV fistula on the right side.   Gastrointestinal: Negative for abdominal distention, abdominal pain, constipation and vomiting.   Endocrine: Negative for polydipsia and polyphagia.   Genitourinary: Negative for difficulty urinating and dysuria.        Patient does make some urine spontaneously.on Hemodialysis   Musculoskeletal: Negative for arthralgias, joint swelling and myalgias.   Skin: Negative for color change, pallor and rash.   Neurological: Positive for numbness. Negative for dizziness, tremors, seizures, syncope and weakness.        She complains of numbness in the right hand.   Psychiatric/Behavioral: Negative for agitation, confusion and dysphoric mood. The patient is not nervous/anxious.         Expected age-related cognitive decline.         Objective:      Blood pressure 139/67, pulse 92, temperature 97.9 °F (36.6 °C), height 5' 6" (1.676 m), weight 57.6 kg (127 lb), SpO2 98 %. Body mass index is 20.5 kg/m².  Physical Exam  Vitals signs and nursing note reviewed.   Constitutional:       General: She is not in acute distress.     Appearance: She is well-developed. She is ill-appearing. She is not toxic-appearing or diaphoretic.      Comments: Seem to have some difficulty recalling details of medical issues..  Wearing a mask.   HENT:      Head: Normocephalic and atraumatic.      Mouth/Throat:      Pharynx: No oropharyngeal exudate.   Eyes:      General: No scleral icterus.        Right eye: No discharge.         Left eye: No discharge.      Conjunctiva/sclera: Conjunctivae normal.   Neck:      Musculoskeletal: Normal range of motion and neck supple.      Thyroid: No thyromegaly.      Vascular: No JVD.      Trachea: Trachea normal. No tracheal deviation.   Cardiovascular:      Rate and Rhythm: Normal rate and regular rhythm.      Heart sounds: S1 normal and " S2 normal. Murmur present. Systolic murmur present with a grade of 2/6. No friction rub.      Comments: Dr Thompson Cardiologist  Pulmonary:      Effort: No respiratory distress.      Breath sounds: Normal breath sounds.   Abdominal:      General: Bowel sounds are normal.      Palpations: Abdomen is soft. Abdomen is not rigid.      Tenderness: There is no guarding.   Musculoskeletal:         General: No tenderness or deformity.        Arms:       Right lower leg: No edema.      Left lower leg: No edema.   Lymphadenopathy:      Cervical: No cervical adenopathy.   Skin:     General: Skin is warm and dry.      Coloration: Skin is pale.      Findings: No bruising or rash.      Comments: Rt arm AV shunt with thrill   Neurological:      Mental Status: She is alert. Mental status is at baseline.      Coordination: Coordination normal.   Psychiatric:         Mood and Affect: Affect is not labile.         Speech: Speech normal.         Behavior: Behavior normal. Behavior is cooperative.         Cognition and Memory: Cognition is impaired (struggles to remember details).         Judgment: Judgment is not inappropriate.           Assessment:       1. Paroxysmal atrial fibrillation    2. Benign hypertension with ESRD (end-stage renal disease)    3. Coronary artery disease of native heart with stable angina pectoris, unspecified vessel or lesion type    4. Multiple-type hyperlipidemia    5. Stage 5 chronic kidney disease on chronic dialysis           No visits with results within 3 Month(s) from this visit.   Latest known visit with results is:   Admission on 12/13/2019, Discharged on 12/14/2019   Component Date Value Ref Range Status    WBC 12/13/2019 5.38  3.90 - 12.70 K/uL Final    RBC 12/13/2019 3.54* 4.00 - 5.40 M/uL Final    Hemoglobin 12/13/2019 10.6* 12.0 - 16.0 g/dL Final    Hematocrit 12/13/2019 34.0* 37.0 - 48.5 % Final    MCV 12/13/2019 96  82 - 98 fL Final    MCH 12/13/2019 29.9  27.0 - 31.0 pg Final    MCHC  12/13/2019 31.2* 32.0 - 36.0 g/dL Final    RDW 12/13/2019 13.9  11.5 - 14.5 % Final    Platelets 12/13/2019 226  150 - 350 K/uL Final    MPV 12/13/2019 9.6  9.2 - 12.9 fL Final    Immature Granulocytes 12/13/2019 0.4  0.0 - 0.5 % Final    Gran # (ANC) 12/13/2019 2.4  1.8 - 7.7 K/uL Final    Immature Grans (Abs) 12/13/2019 0.02  0.00 - 0.04 K/uL Final    Lymph # 12/13/2019 1.7  1.0 - 4.8 K/uL Final    Mono # 12/13/2019 0.9  0.3 - 1.0 K/uL Final    Eos # 12/13/2019 0.4  0.0 - 0.5 K/uL Final    Baso # 12/13/2019 0.03  0.00 - 0.20 K/uL Final    nRBC 12/13/2019 1* 0 /100 WBC Final    Gran % 12/13/2019 45.3  38.0 - 73.0 % Final    Lymph % 12/13/2019 31.2  18.0 - 48.0 % Final    Mono % 12/13/2019 16.0* 4.0 - 15.0 % Final    Eosinophil % 12/13/2019 6.5  0.0 - 8.0 % Final    Basophil % 12/13/2019 0.6  0.0 - 1.9 % Final    Differential Method 12/13/2019 Automated   Final    Sodium 12/13/2019 138  136 - 145 mmol/L Final    Potassium 12/13/2019 3.2* 3.5 - 5.1 mmol/L Final    Chloride 12/13/2019 94* 95 - 110 mmol/L Final    CO2 12/13/2019 35* 22 - 31 mmol/L Final    Glucose 12/13/2019 96  70 - 110 mg/dL Final    BUN 12/13/2019 14  7 - 18 mg/dL Final    Creatinine 12/13/2019 4.43* 0.50 - 1.40 mg/dL Final    Calcium 12/13/2019 8.7  8.4 - 10.2 mg/dL Final    Total Protein 12/13/2019 6.9  6.0 - 8.4 g/dL Final    Albumin 12/13/2019 4.1  3.5 - 5.2 g/dL Final    Total Bilirubin 12/13/2019 0.7  0.2 - 1.3 mg/dL Final    Alkaline Phosphatase 12/13/2019 71  38 - 145 U/L Final    AST 12/13/2019 27  14 - 36 U/L Final    ALT 12/13/2019 28  10 - 44 U/L Final    Anion Gap 12/13/2019 9  8 - 16 mmol/L Final    eGFR if African American 12/13/2019 10* >60 mL/min/1.73 m^2 Final    eGFR if non African American 12/13/2019 9* >60 mL/min/1.73 m^2 Final    Troponin I 12/13/2019 0.017  0.012 - 0.034 ng/mL Final    POC PH 12/13/2019 7.51* 7.35 - 7.45 Final    POC PCO2 12/13/2019 43  35.0 - 45.0 mmHg Final    POC PO2  12/13/2019 90  80.0 - 100.0 mmHg Final    POC THb 12/13/2019 9.8* 12.0 - 18.0 g/dL Final    POC O2Hb 12/13/2019 97.2  94.0 - 100.0 % Final    POC COHb 12/13/2019 0.7  <1.6 % Final    POC MetHb 12/13/2019 0.0  <3.0 % Final    POC SATURATED O2 12/13/2019 97.9  94.0 - 100.0 % Final    POC pH Temp 12/13/2019 7.51* 7.35 - 7.45 Final    POC pCO2 Temp 12/13/2019 43  35.0 - 45.0 mmHg Final    POC pO2 Temp 12/13/2019 90  80.0 - 100.0 mmHg Final    POC Temp 12/13/2019 37.0  C Final    Mode #1 12/13/2019 -   Final    O2 Device #1 12/13/2019 -   Final    O2 Device #2 12/13/2019 -   Final    FiO2 12/13/2019 -  % Final    Mech VT 12/13/2019 -  mL Final    Mech Rate (BPM) 12/13/2019 -  bpm Final    PiP 12/13/2019 -  cm H2O Final    PEEP 12/13/2019 -  cm H2O Final    Pressure Support 12/13/2019 -  cm H2O Final    Pressure Control 12/13/2019 -  cm H2O Final    Pulse Ox 12/13/2019 -  % Final    IPAP 12/13/2019 -  cm H2O Final    EPAP 12/13/2019 -  cm H2O Final    Flow 12/13/2019 -  LPM Final    Frequency 12/13/2019 -  Hz Final    I Time 12/13/2019 -   Final    Nitric 12/13/2019 -  PPM Final    PAW 12/13/2019 -  cmH2O Final    POC BE(B) 12/13/2019 10.1* -2.0 - 2.0 mmol/L Final    POC pAO2 12/13/2019 CANCELED  mmHg Final    POC HCO3-(c) 12/13/2019 34.3* 22.0 - 26.0 mmol/L Final    POC A-aDO2 12/13/2019 CANCELED  mmHg Final    Date of Draw 12/13/2019 [id number]   Final    Time of Draw 12/13/2019 1235   Final    Allens Test 12/13/2019 NA   Final    Analyzed by: 12/13/2019 MHB   Final    Drawn by: 12/13/2019 MHB   Final    Sample Site 12/13/2019 LBA   Final    PT 12/13/2019 22.7* 11.8 - 14.7 sec Final    INR 12/13/2019 2.2   Final    LVIDs 12/13/2019 3.00  2.1 - 4.0 cm Final    Ascending aorta 12/13/2019 2.9  cm Final    AV mean gradient 12/13/2019 4  mmHg Final    Ao peak paige 12/13/2019 1.56  m/s Final    Ao VTI 12/13/2019 37.7  cm Final    IVS 12/13/2019 1.32* 0.6 - 1.1 cm Final    LA size  12/13/2019 3.4  cm Final    LVIDd 12/13/2019 3.67  3.5 - 6.0 cm Final    LVOT diameter 12/13/2019 2.4  cm Final    LVOT peak VTI 12/13/2019 29.00  cm Final    Posterior Wall 12/13/2019 1.22* 0.6 - 1.1 cm Final    MV Peak A Romeo 12/13/2019 1.27  m/s Final    E wave decelartion time 12/13/2019 299  msec Final    RA Major Axis 12/13/2019 4.42  cm Final    RA Width 12/13/2019 2.96  cm Final    TR Max Romeo 12/13/2019 2.93  m/s Final    LA WIDTH 12/13/2019 3.49  cm Final    PV PEAK VELOCITY 12/13/2019 92.3  cm/s Final    LVOT peak romeo 12/13/2019 111.00  m/s Final    BSA 12/13/2019 1.63  m2 Final    TDI SEPTAL 12/13/2019 0.05  m/s Final    LV LATERAL E/E' RATIO 12/13/2019 16.25  m/s Final    LV SEPTAL E/E' RATIO 12/13/2019 26.00  m/s Final    TDI LATERAL 12/13/2019 0.08  m/s Final    FS 12/13/2019 18  28 - 44 % Final    LA volume 12/13/2019 44.88  cm3 Final    LV mass 12/13/2019 158.72  g Final    Left Ventricle Relative Wall Thick* 12/13/2019 0.66  cm Final    AV valve area 12/13/2019 3.48  cm2 Final    AV Velocity Ratio 12/13/2019 71.15   Final    AV index (prosthetic) 12/13/2019 0.77   Final    MV valve area p 1/2 method 12/13/2019 2.34  cm2 Final    E/A ratio 12/13/2019 1.02   Final    Mean e' 12/13/2019 0.07  m/s Final    LVOT area 12/13/2019 4.5  cm2 Final    LVOT stroke volume 12/13/2019 131.13  cm3 Final    AV peak gradient 12/13/2019 10  mmHg Final    E/E' ratio 12/13/2019 20.00  m/s Final    MV Peak E Romeo 12/13/2019 1.30  m/s Final    MV stenosis pressure 1/2 time 12/13/2019 94  ms Final    LA Volume Index 12/13/2019 27.3  mL/m2 Final    LV Mass Index 12/13/2019 97  g/m2 Final    Left Atrium Minor Axis 12/13/2019 4.45  cm Final    Left Atrium Major Axis 12/13/2019 4.45  cm Final    Triscuspid Valve Regurgitation Pea* 12/13/2019 34  mmHg Final    Right Atrial Pressure (from IVC) 12/13/2019 3  mmHg Final    TV rest pulmonary artery pressure 12/13/2019 37  mmHg Final     Magnesium 12/13/2019 2.0  1.6 - 2.6 mg/dL Final    POCT Glucose 12/13/2019 90  70 - 110 mg/dL Final    Sodium 12/14/2019 134* 136 - 145 mmol/L Final    Potassium 12/14/2019 4.5  3.5 - 5.1 mmol/L Final    Chloride 12/14/2019 97  95 - 110 mmol/L Final    CO2 12/14/2019 31  22 - 31 mmol/L Final    Glucose 12/14/2019 95  70 - 110 mg/dL Final    BUN 12/14/2019 23* 7 - 18 mg/dL Final    Creatinine 12/14/2019 5.97* 0.50 - 1.40 mg/dL Final    Calcium 12/14/2019 8.3* 8.4 - 10.2 mg/dL Final    Anion Gap 12/14/2019 6* 8 - 16 mmol/L Final    eGFR if  12/14/2019 7* >60 mL/min/1.73 m^2 Final    eGFR if non African American 12/14/2019 6* >60 mL/min/1.73 m^2 Final    WBC 12/14/2019 4.42  3.90 - 12.70 K/uL Final    RBC 12/14/2019 3.50* 4.00 - 5.40 M/uL Final    Hemoglobin 12/14/2019 10.6* 12.0 - 16.0 g/dL Final    Hematocrit 12/14/2019 33.9* 37.0 - 48.5 % Final    MCV 12/14/2019 97  82 - 98 fL Final    MCH 12/14/2019 30.3  27.0 - 31.0 pg Final    MCHC 12/14/2019 31.3* 32.0 - 36.0 g/dL Final    RDW 12/14/2019 14.3  11.5 - 14.5 % Final    Platelets 12/14/2019 156  150 - 350 K/uL Final    MPV 12/14/2019 10.0  9.2 - 12.9 fL Final    Immature Granulocytes 12/14/2019 0.5  0.0 - 0.5 % Final    Gran # (ANC) 12/14/2019 2.1  1.8 - 7.7 K/uL Final    Immature Grans (Abs) 12/14/2019 0.02  0.00 - 0.04 K/uL Final    Lymph # 12/14/2019 1.2  1.0 - 4.8 K/uL Final    Mono # 12/14/2019 0.8  0.3 - 1.0 K/uL Final    Eos # 12/14/2019 0.3  0.0 - 0.5 K/uL Final    Baso # 12/14/2019 0.01  0.00 - 0.20 K/uL Final    nRBC 12/14/2019 0  0 /100 WBC Final    Gran % 12/14/2019 47.1  38.0 - 73.0 % Final    Lymph % 12/14/2019 27.8  18.0 - 48.0 % Final    Mono % 12/14/2019 17.4* 4.0 - 15.0 % Final    Eosinophil % 12/14/2019 7.0  0.0 - 8.0 % Final    Basophil % 12/14/2019 0.2  0.0 - 1.9 % Final    Differential Method 12/14/2019 Automated   Final    Glucose 12/14/2019 95  70 - 110 mg/dL Final    Sodium 12/14/2019  134* 136 - 145 mmol/L Final    Potassium 12/14/2019 4.5  3.5 - 5.1 mmol/L Final    Chloride 12/14/2019 97  95 - 110 mmol/L Final    CO2 12/14/2019 31  22 - 31 mmol/L Final    BUN 12/14/2019 23* 7 - 18 mg/dL Final    Calcium 12/14/2019 8.3* 8.4 - 10.2 mg/dL Final    Creatinine 12/14/2019 5.97* 0.50 - 1.40 mg/dL Final    Albumin 12/14/2019 3.5  3.5 - 5.2 g/dL Final    Phosphorus 12/14/2019 3.2  2.7 - 4.5 mg/dL Final    eGFR if  12/14/2019 7* >60 mL/min/1.73 m^2 Final    eGFR if non African American 12/14/2019 6* >60 mL/min/1.73 m^2 Final    Anion Gap 12/14/2019 6* 8 - 16 mmol/L Final         Plan:           Paroxysmal atrial fibrillation  Comments:  Patient was recently hospitalized with paroxysmal atrial fibrillation with rapid ventricular response.  This responded to hydration.  [facility]    Benign hypertension with ESRD (end-stage renal disease)  Comments:  Blood pressures are stable as she goes through dialysis for end-stage kidney disease.  3 times a week.    Coronary artery disease of native heart with stable angina pectoris, unspecified vessel or lesion type  Comments:  No major symptoms of angina or chest pain at this point.    Multiple-type hyperlipidemia    Stage 5 chronic kidney disease on chronic dialysis  Comments:  Dialysis on Monday, Wednesday and Friday.  Shunt revision done recently on right upper arm.     Recent hospitalization for rapid ventricular response and syncopal episode has been noted.  She was given hydration and she came out of it.  Probably she might have had a little bit more overzealous dialysis.      She is also on anticoagulation with warfarin.  This is being followed by Dr. Thompson her current cardiologist.  I have discussed her about being cautious on this medication because this is a blood thinner.  Any injury could be bad.      She continues on dialysis 3 times a week on Monday, Wednesday and Friday.  After dialysis her blood pressures  are slightly on the lower side and she feels fatigue.  Prior to dialysis she feels the best.    She is utilizing the COVID-19 precautions and trying to avoid going and lesvia its or in crowds.      Advised Ms. Isaac about age and season appropriate immunizations/ cancer screenings.  Also seasonal influenza vaccine, update on tetanus diphtheria vaccination every 10 years.    Patient continues to watch her diet and her son does the cooking any makes sure that she does not get any fatty, greasy or non recommended food.    Fup 4 months    Spent niesha 25 minutes with patient which involved review of pts medical conditions, labs, medications and with 50% of time face-to-face discussion about medical problems, management and any applicable changes.      Current Outpatient Medications:     amLODIPine (NORVASC) 5 MG tablet, Take 5 mg by mouth once daily., Disp: , Rfl:     atorvastatin (LIPITOR) 40 MG tablet, Take 40 mg by mouth once daily., Disp: , Rfl:     b complex vitamins tablet, Take 1 tablet by mouth once daily., Disp: , Rfl:     calcium acetate (PHOSLO) 667 mg capsule, Take 667 mg by mouth 2 (two) times daily., Disp: , Rfl: 6    docusate sodium (COLACE) 100 MG capsule, Take 100 mg by mouth once daily. , Disp: , Rfl:     dorzolamide-timolol 2-0.5% (COSOPT) 22.3-6.8 mg/mL ophthalmic solution, Place 1 drop into both eyes 2 (two) times daily. , Disp: , Rfl:     latanoprost 0.005 % ophthalmic solution, Place 1 drop into both eyes every evening. , Disp: , Rfl:     lidocaine 5 % Crea, Apply 1 application topically every Mon, Wed, Fri. , Disp: , Rfl:     warfarin (COUMADIN) 3 MG tablet, Take 1.5 mg by mouth every Tues, Thurs. , Disp: , Rfl:     warfarin (COUMADIN) 3 MG tablet, Take 3 mg by mouth every Mon, Wed, Fri, Sun., Disp: , Rfl:

## 2021-02-02 ENCOUNTER — OFFICE VISIT (OUTPATIENT)
Dept: FAMILY MEDICINE | Facility: CLINIC | Age: 81
End: 2021-02-02
Payer: MEDICARE

## 2021-02-02 VITALS
HEART RATE: 78 BPM | BODY MASS INDEX: 20.73 KG/M2 | WEIGHT: 129 LBS | DIASTOLIC BLOOD PRESSURE: 50 MMHG | HEIGHT: 66 IN | SYSTOLIC BLOOD PRESSURE: 111 MMHG | TEMPERATURE: 98 F

## 2021-02-02 DIAGNOSIS — Z99.2 STAGE 5 CHRONIC KIDNEY DISEASE ON CHRONIC DIALYSIS: ICD-10-CM

## 2021-02-02 DIAGNOSIS — I48.0 PAROXYSMAL ATRIAL FIBRILLATION: Primary | ICD-10-CM

## 2021-02-02 DIAGNOSIS — N18.6 STAGE 5 CHRONIC KIDNEY DISEASE ON CHRONIC DIALYSIS: ICD-10-CM

## 2021-02-02 DIAGNOSIS — I25.118 CORONARY ARTERY DISEASE OF NATIVE HEART WITH STABLE ANGINA PECTORIS, UNSPECIFIED VESSEL OR LESION TYPE: ICD-10-CM

## 2021-02-02 DIAGNOSIS — E78.2 MULTIPLE-TYPE HYPERLIPIDEMIA: ICD-10-CM

## 2021-02-02 DIAGNOSIS — N18.6 BENIGN HYPERTENSION WITH ESRD (END-STAGE RENAL DISEASE): ICD-10-CM

## 2021-02-02 DIAGNOSIS — Z79.01 CHRONIC ANTICOAGULATION: ICD-10-CM

## 2021-02-02 DIAGNOSIS — I12.0 BENIGN HYPERTENSION WITH ESRD (END-STAGE RENAL DISEASE): ICD-10-CM

## 2021-02-02 PROCEDURE — 1101F PT FALLS ASSESS-DOCD LE1/YR: CPT | Mod: S$GLB,,, | Performed by: INTERNAL MEDICINE

## 2021-02-02 PROCEDURE — 1170F PR FUNCTIONAL STATUS ASSESSED: ICD-10-PCS | Mod: S$GLB,,, | Performed by: INTERNAL MEDICINE

## 2021-02-02 PROCEDURE — 1126F AMNT PAIN NOTED NONE PRSNT: CPT | Mod: S$GLB,,, | Performed by: INTERNAL MEDICINE

## 2021-02-02 PROCEDURE — 1159F MED LIST DOCD IN RCRD: CPT | Mod: S$GLB,,, | Performed by: INTERNAL MEDICINE

## 2021-02-02 PROCEDURE — 3288F FALL RISK ASSESSMENT DOCD: CPT | Mod: S$GLB,,, | Performed by: INTERNAL MEDICINE

## 2021-02-02 PROCEDURE — 99214 PR OFFICE/OUTPT VISIT, EST, LEVL IV, 30-39 MIN: ICD-10-PCS | Mod: S$GLB,,, | Performed by: INTERNAL MEDICINE

## 2021-02-02 PROCEDURE — 99214 OFFICE O/P EST MOD 30 MIN: CPT | Mod: S$GLB,,, | Performed by: INTERNAL MEDICINE

## 2021-02-02 PROCEDURE — 1159F PR MEDICATION LIST DOCUMENTED IN MEDICAL RECORD: ICD-10-PCS | Mod: S$GLB,,, | Performed by: INTERNAL MEDICINE

## 2021-02-02 PROCEDURE — 1126F PR PAIN SEVERITY QUANTIFIED, NO PAIN PRESENT: ICD-10-PCS | Mod: S$GLB,,, | Performed by: INTERNAL MEDICINE

## 2021-02-02 PROCEDURE — 3288F PR FALLS RISK ASSESSMENT DOCUMENTED: ICD-10-PCS | Mod: S$GLB,,, | Performed by: INTERNAL MEDICINE

## 2021-02-02 PROCEDURE — 1170F FXNL STATUS ASSESSED: CPT | Mod: S$GLB,,, | Performed by: INTERNAL MEDICINE

## 2021-02-02 PROCEDURE — 1101F PR PT FALLS ASSESS DOC 0-1 FALLS W/OUT INJ PAST YR: ICD-10-PCS | Mod: S$GLB,,, | Performed by: INTERNAL MEDICINE

## 2021-03-03 ENCOUNTER — HOSPITAL ENCOUNTER (OUTPATIENT)
Facility: HOSPITAL | Age: 81
Discharge: HOME | End: 2021-03-05
Attending: EMERGENCY MEDICINE | Admitting: INTERNAL MEDICINE
Payer: MEDICARE

## 2021-03-03 DIAGNOSIS — R07.9 CHEST PAIN, UNSPECIFIED TYPE: ICD-10-CM

## 2021-03-03 DIAGNOSIS — R00.0 HEART RATE FAST: ICD-10-CM

## 2021-03-03 DIAGNOSIS — R07.9 CHEST PAIN: ICD-10-CM

## 2021-03-03 DIAGNOSIS — I48.91 ATRIAL FIBRILLATION WITH RAPID VENTRICULAR RESPONSE: Primary | ICD-10-CM

## 2021-03-03 DIAGNOSIS — I48.91 ATRIAL FIBRILLATION WITH RVR: ICD-10-CM

## 2021-03-03 DIAGNOSIS — I48.91 A-FIB: ICD-10-CM

## 2021-03-03 LAB
ALBUMIN SERPL BCP-MCNC: 3.7 G/DL (ref 3.5–5.2)
ALP SERPL-CCNC: 59 U/L (ref 55–135)
ALT SERPL W/O P-5'-P-CCNC: 31 U/L (ref 10–44)
ANION GAP SERPL CALC-SCNC: 14 MMOL/L (ref 8–16)
APTT PPP: 99.5 SEC (ref 23.6–33.3)
AST SERPL-CCNC: 25 U/L (ref 10–40)
BASOPHILS # BLD AUTO: 0.03 K/UL (ref 0–0.2)
BASOPHILS NFR BLD: 0.4 % (ref 0–1.9)
BILIRUB SERPL-MCNC: 0.8 MG/DL (ref 0.1–1)
BUN SERPL-MCNC: 30 MG/DL (ref 8–23)
CALCIUM SERPL-MCNC: 8.6 MG/DL (ref 8.7–10.5)
CHLORIDE SERPL-SCNC: 98 MMOL/L (ref 95–110)
CO2 SERPL-SCNC: 30 MMOL/L (ref 23–29)
CREAT SERPL-MCNC: 6.2 MG/DL (ref 0.5–1.4)
D DIMER PPP IA.FEU-MCNC: 0.28 UG/ML FEU
DIFFERENTIAL METHOD: ABNORMAL
EOSINOPHIL # BLD AUTO: 0.6 K/UL (ref 0–0.5)
EOSINOPHIL NFR BLD: 8.7 % (ref 0–8)
ERYTHROCYTE [DISTWIDTH] IN BLOOD BY AUTOMATED COUNT: 13.6 % (ref 11.5–14.5)
EST. GFR  (AFRICAN AMERICAN): 6.8 ML/MIN/1.73 M^2
EST. GFR  (NON AFRICAN AMERICAN): 5.9 ML/MIN/1.73 M^2
GLUCOSE SERPL-MCNC: 104 MG/DL (ref 70–110)
HCT VFR BLD AUTO: 32.7 % (ref 37–48.5)
HGB BLD-MCNC: 10.1 G/DL (ref 12–16)
IMM GRANULOCYTES # BLD AUTO: 0.03 K/UL (ref 0–0.04)
IMM GRANULOCYTES NFR BLD AUTO: 0.4 % (ref 0–0.5)
INR PPP: 2.6
LYMPHOCYTES # BLD AUTO: 1.3 K/UL (ref 1–4.8)
LYMPHOCYTES NFR BLD: 19.2 % (ref 18–48)
MAGNESIUM SERPL-MCNC: 2.1 MG/DL (ref 1.6–2.6)
MCH RBC QN AUTO: 31.1 PG (ref 27–31)
MCHC RBC AUTO-ENTMCNC: 30.9 G/DL (ref 32–36)
MCV RBC AUTO: 101 FL (ref 82–98)
MONOCYTES # BLD AUTO: 0.8 K/UL (ref 0.3–1)
MONOCYTES NFR BLD: 11.8 % (ref 4–15)
NEUTROPHILS # BLD AUTO: 4 K/UL (ref 1.8–7.7)
NEUTROPHILS NFR BLD: 59.5 % (ref 38–73)
NRBC BLD-RTO: 0 /100 WBC
PLATELET # BLD AUTO: 225 K/UL (ref 150–350)
PMV BLD AUTO: 9.8 FL (ref 9.2–12.9)
POTASSIUM SERPL-SCNC: 3.6 MMOL/L (ref 3.5–5.1)
PROT SERPL-MCNC: 6.8 G/DL (ref 6–8.4)
PROTHROMBIN TIME: 27 SEC (ref 10.6–14.8)
RBC # BLD AUTO: 3.25 M/UL (ref 4–5.4)
SARS-COV-2 RDRP RESP QL NAA+PROBE: NEGATIVE
SODIUM SERPL-SCNC: 142 MMOL/L (ref 136–145)
TROPONIN I SERPL DL<=0.01 NG/ML-MCNC: <0.03 NG/ML
TSH SERPL DL<=0.005 MIU/L-ACNC: 1.44 UIU/ML (ref 0.34–5.6)
WBC # BLD AUTO: 6.76 K/UL (ref 3.9–12.7)

## 2021-03-03 PROCEDURE — 94761 N-INVAS EAR/PLS OXIMETRY MLT: CPT

## 2021-03-03 PROCEDURE — G0378 HOSPITAL OBSERVATION PER HR: HCPCS

## 2021-03-03 PROCEDURE — 85730 THROMBOPLASTIN TIME PARTIAL: CPT

## 2021-03-03 PROCEDURE — 99291 CRITICAL CARE FIRST HOUR: CPT | Mod: 25

## 2021-03-03 PROCEDURE — 27000221 HC OXYGEN, UP TO 24 HOURS

## 2021-03-03 PROCEDURE — 93010 EKG 12-LEAD: ICD-10-PCS | Mod: 76,,, | Performed by: INTERNAL MEDICINE

## 2021-03-03 PROCEDURE — 99900035 HC TECH TIME PER 15 MIN (STAT)

## 2021-03-03 PROCEDURE — 25000003 PHARM REV CODE 250: Performed by: EMERGENCY MEDICINE

## 2021-03-03 PROCEDURE — 96365 THER/PROPH/DIAG IV INF INIT: CPT

## 2021-03-03 PROCEDURE — 85025 COMPLETE CBC W/AUTO DIFF WBC: CPT

## 2021-03-03 PROCEDURE — 25000003 PHARM REV CODE 250: Performed by: INTERNAL MEDICINE

## 2021-03-03 PROCEDURE — 85610 PROTHROMBIN TIME: CPT

## 2021-03-03 PROCEDURE — 63600175 PHARM REV CODE 636 W HCPCS: Performed by: EMERGENCY MEDICINE

## 2021-03-03 PROCEDURE — 96366 THER/PROPH/DIAG IV INF ADDON: CPT

## 2021-03-03 PROCEDURE — 85379 FIBRIN DEGRADATION QUANT: CPT

## 2021-03-03 PROCEDURE — 80053 COMPREHEN METABOLIC PANEL: CPT

## 2021-03-03 PROCEDURE — 83735 ASSAY OF MAGNESIUM: CPT

## 2021-03-03 PROCEDURE — 84484 ASSAY OF TROPONIN QUANT: CPT

## 2021-03-03 PROCEDURE — 84443 ASSAY THYROID STIM HORMONE: CPT

## 2021-03-03 PROCEDURE — U0002 COVID-19 LAB TEST NON-CDC: HCPCS

## 2021-03-03 PROCEDURE — 93010 ELECTROCARDIOGRAM REPORT: CPT | Mod: ,,, | Performed by: INTERNAL MEDICINE

## 2021-03-03 PROCEDURE — 93005 ELECTROCARDIOGRAM TRACING: CPT | Performed by: INTERNAL MEDICINE

## 2021-03-03 RX ORDER — SODIUM CHLORIDE 9 MG/ML
INJECTION, SOLUTION INTRAVENOUS ONCE
Status: CANCELLED | OUTPATIENT
Start: 2021-03-03 | End: 2021-03-03

## 2021-03-03 RX ORDER — POTASSIUM CHLORIDE 7.45 MG/ML
40 INJECTION INTRAVENOUS
Status: DISCONTINUED | OUTPATIENT
Start: 2021-03-03 | End: 2021-03-05 | Stop reason: HOSPADM

## 2021-03-03 RX ORDER — POTASSIUM CHLORIDE 20 MEQ/1
40 TABLET, EXTENDED RELEASE ORAL
Status: DISCONTINUED | OUTPATIENT
Start: 2021-03-03 | End: 2021-03-05 | Stop reason: HOSPADM

## 2021-03-03 RX ORDER — DOCUSATE SODIUM 100 MG/1
100 CAPSULE, LIQUID FILLED ORAL DAILY
Status: DISCONTINUED | OUTPATIENT
Start: 2021-03-04 | End: 2021-03-05 | Stop reason: HOSPADM

## 2021-03-03 RX ORDER — POTASSIUM CHLORIDE 7.45 MG/ML
20 INJECTION INTRAVENOUS
Status: DISCONTINUED | OUTPATIENT
Start: 2021-03-03 | End: 2021-03-05 | Stop reason: HOSPADM

## 2021-03-03 RX ORDER — POTASSIUM CHLORIDE 20 MEQ/1
20 TABLET, EXTENDED RELEASE ORAL
Status: DISCONTINUED | OUTPATIENT
Start: 2021-03-03 | End: 2021-03-05 | Stop reason: HOSPADM

## 2021-03-03 RX ORDER — CALCIUM CHLORIDE IN 0.9 % NACL 1 G/100 ML
1 INTRAVENOUS SOLUTION, PIGGYBACK (ML) INTRAVENOUS
Status: DISCONTINUED | OUTPATIENT
Start: 2021-03-03 | End: 2021-03-05 | Stop reason: HOSPADM

## 2021-03-03 RX ORDER — WARFARIN 1 MG/1
3 TABLET ORAL
Status: DISCONTINUED | OUTPATIENT
Start: 2021-03-03 | End: 2021-03-05 | Stop reason: HOSPADM

## 2021-03-03 RX ORDER — ENOXAPARIN SODIUM 100 MG/ML
1 INJECTION SUBCUTANEOUS
Status: COMPLETED | OUTPATIENT
Start: 2021-03-03 | End: 2021-03-03

## 2021-03-03 RX ORDER — MAGNESIUM SULFATE HEPTAHYDRATE 40 MG/ML
2 INJECTION, SOLUTION INTRAVENOUS
Status: DISCONTINUED | OUTPATIENT
Start: 2021-03-03 | End: 2021-03-05 | Stop reason: HOSPADM

## 2021-03-03 RX ORDER — TALC
6 POWDER (GRAM) TOPICAL NIGHTLY PRN
Status: DISCONTINUED | OUTPATIENT
Start: 2021-03-03 | End: 2021-03-05 | Stop reason: HOSPADM

## 2021-03-03 RX ORDER — LATANOPROST 50 UG/ML
1 SOLUTION/ DROPS OPHTHALMIC NIGHTLY
Status: DISCONTINUED | OUTPATIENT
Start: 2021-03-03 | End: 2021-03-05 | Stop reason: HOSPADM

## 2021-03-03 RX ORDER — ATORVASTATIN CALCIUM 40 MG/1
40 TABLET, FILM COATED ORAL DAILY
Status: DISCONTINUED | OUTPATIENT
Start: 2021-03-04 | End: 2021-03-05 | Stop reason: HOSPADM

## 2021-03-03 RX ORDER — SODIUM CHLORIDE 0.9 % (FLUSH) 0.9 %
10 SYRINGE (ML) INJECTION
Status: DISCONTINUED | OUTPATIENT
Start: 2021-03-03 | End: 2021-03-05 | Stop reason: HOSPADM

## 2021-03-03 RX ORDER — PROCHLORPERAZINE EDISYLATE 5 MG/ML
5 INJECTION INTRAMUSCULAR; INTRAVENOUS EVERY 6 HOURS PRN
Status: DISCONTINUED | OUTPATIENT
Start: 2021-03-03 | End: 2021-03-05 | Stop reason: HOSPADM

## 2021-03-03 RX ORDER — MAGNESIUM SULFATE 1 G/100ML
1 INJECTION INTRAVENOUS
Status: DISCONTINUED | OUTPATIENT
Start: 2021-03-03 | End: 2021-03-05 | Stop reason: HOSPADM

## 2021-03-03 RX ORDER — MAGNESIUM SULFATE HEPTAHYDRATE 40 MG/ML
4 INJECTION, SOLUTION INTRAVENOUS
Status: DISCONTINUED | OUTPATIENT
Start: 2021-03-03 | End: 2021-03-05 | Stop reason: HOSPADM

## 2021-03-03 RX ORDER — IBUPROFEN 200 MG
24 TABLET ORAL
Status: DISCONTINUED | OUTPATIENT
Start: 2021-03-03 | End: 2021-03-05 | Stop reason: HOSPADM

## 2021-03-03 RX ORDER — LANOLIN ALCOHOL/MO/W.PET/CERES
800 CREAM (GRAM) TOPICAL
Status: DISCONTINUED | OUTPATIENT
Start: 2021-03-03 | End: 2021-03-05 | Stop reason: HOSPADM

## 2021-03-03 RX ORDER — AMIODARONE HYDROCHLORIDE 150 MG/3ML
150 INJECTION, SOLUTION INTRAVENOUS
Status: DISCONTINUED | OUTPATIENT
Start: 2021-03-03 | End: 2021-03-03

## 2021-03-03 RX ORDER — DORZOLAMIDE HYDROCHLORIDE AND TIMOLOL MALEATE 20; 5 MG/ML; MG/ML
1 SOLUTION/ DROPS OPHTHALMIC 2 TIMES DAILY
Status: DISCONTINUED | OUTPATIENT
Start: 2021-03-03 | End: 2021-03-05 | Stop reason: HOSPADM

## 2021-03-03 RX ORDER — GLUCAGON 1 MG
1 KIT INJECTION
Status: DISCONTINUED | OUTPATIENT
Start: 2021-03-03 | End: 2021-03-05 | Stop reason: HOSPADM

## 2021-03-03 RX ORDER — ACETAMINOPHEN 325 MG/1
650 TABLET ORAL EVERY 8 HOURS PRN
Status: DISCONTINUED | OUTPATIENT
Start: 2021-03-03 | End: 2021-03-05 | Stop reason: HOSPADM

## 2021-03-03 RX ORDER — IBUPROFEN 200 MG
16 TABLET ORAL
Status: DISCONTINUED | OUTPATIENT
Start: 2021-03-03 | End: 2021-03-05 | Stop reason: HOSPADM

## 2021-03-03 RX ORDER — MUPIROCIN 20 MG/G
OINTMENT TOPICAL 2 TIMES DAILY
Status: DISCONTINUED | OUTPATIENT
Start: 2021-03-03 | End: 2021-03-04

## 2021-03-03 RX ORDER — ONDANSETRON 2 MG/ML
4 INJECTION INTRAMUSCULAR; INTRAVENOUS EVERY 8 HOURS PRN
Status: DISCONTINUED | OUTPATIENT
Start: 2021-03-03 | End: 2021-03-05 | Stop reason: HOSPADM

## 2021-03-03 RX ORDER — CALCIUM ACETATE 667 MG/1
667 CAPSULE ORAL 2 TIMES DAILY
Status: DISCONTINUED | OUTPATIENT
Start: 2021-03-03 | End: 2021-03-05 | Stop reason: HOSPADM

## 2021-03-03 RX ADMIN — WARFARIN SODIUM 3 MG: 1 TABLET ORAL at 05:03

## 2021-03-03 RX ADMIN — SODIUM CHLORIDE 250 ML: 0.9 INJECTION, SOLUTION INTRAVENOUS at 08:03

## 2021-03-03 RX ADMIN — MUPIROCIN: 20 OINTMENT TOPICAL at 09:03

## 2021-03-03 RX ADMIN — CALCIUM ACETATE 667 MG: 667 CAPSULE ORAL at 09:03

## 2021-03-03 RX ADMIN — AMIODARONE HYDROCHLORIDE 1 MG/MIN: 1.8 INJECTION, SOLUTION INTRAVENOUS at 08:03

## 2021-03-03 RX ADMIN — AMIODARONE HYDROCHLORIDE 150 MG: 1.5 INJECTION, SOLUTION INTRAVENOUS at 08:03

## 2021-03-03 RX ADMIN — DORZOLAMIDE HYDROCHLORIDE AND TIMOLOL MALEATE 1 DROP: 22.3; 6.8 SOLUTION/ DROPS OPHTHALMIC at 09:03

## 2021-03-03 RX ADMIN — LATANOPROST 1 DROP: 50 SOLUTION OPHTHALMIC at 09:03

## 2021-03-03 RX ADMIN — ENOXAPARIN SODIUM 60 MG: 60 INJECTION SUBCUTANEOUS at 09:03

## 2021-03-04 ENCOUNTER — CLINICAL SUPPORT (OUTPATIENT)
Dept: CARDIOLOGY | Facility: HOSPITAL | Age: 81
End: 2021-03-04
Attending: SPECIALIST
Payer: MEDICARE

## 2021-03-04 LAB
ANION GAP SERPL CALC-SCNC: 11 MMOL/L (ref 8–16)
AORTIC ROOT ANNULUS: 2.61 CM
AORTIC VALVE CUSP SEPERATION: 1.71 CM
AV INDEX (PROSTH): 0.75
AV MEAN GRADIENT: 6 MMHG
AV PEAK GRADIENT: 10 MMHG
AV VALVE AREA: 2.38 CM2
AV VELOCITY RATIO: 82.5
BASOPHILS # BLD AUTO: 0.02 K/UL (ref 0–0.2)
BASOPHILS NFR BLD: 0.4 % (ref 0–1.9)
BSA FOR ECHO PROCEDURE: 1.65 M2
BUN SERPL-MCNC: 40 MG/DL (ref 8–23)
CALCIUM SERPL-MCNC: 8.6 MG/DL (ref 8.7–10.5)
CHLORIDE SERPL-SCNC: 100 MMOL/L (ref 95–110)
CO2 SERPL-SCNC: 29 MMOL/L (ref 23–29)
CREAT SERPL-MCNC: 8.1 MG/DL (ref 0.5–1.4)
CV ECHO LV RWT: 0.7 CM
DIFFERENTIAL METHOD: ABNORMAL
DOP CALC AO PEAK VEL: 1.6 M/S
DOP CALC AO VTI: 33.1 CM
DOP CALC LVOT AREA: 3.2 CM2
DOP CALC LVOT DIAMETER: 2.01 CM
DOP CALC LVOT PEAK VEL: 132 M/S
DOP CALC LVOT STROKE VOLUME: 78.91 CM3
DOP CALCLVOT PEAK VEL VTI: 24.88 CM
E WAVE DECELERATION TIME: 386.85 MSEC
E/A RATIO: 1.43
E/E' RATIO: 19.88 M/S
ECHO LV POSTERIOR WALL: 1.07 CM (ref 0.6–1.1)
EOSINOPHIL # BLD AUTO: 0.6 K/UL (ref 0–0.5)
EOSINOPHIL NFR BLD: 12 % (ref 0–8)
ERYTHROCYTE [DISTWIDTH] IN BLOOD BY AUTOMATED COUNT: 13.5 % (ref 11.5–14.5)
EST. GFR  (AFRICAN AMERICAN): 4.9 ML/MIN/1.73 M^2
EST. GFR  (NON AFRICAN AMERICAN): 4.3 ML/MIN/1.73 M^2
FRACTIONAL SHORTENING: 47 % (ref 28–44)
GLUCOSE SERPL-MCNC: 117 MG/DL (ref 70–110)
HCT VFR BLD AUTO: 30.6 % (ref 37–48.5)
HGB BLD-MCNC: 9.4 G/DL (ref 12–16)
IMM GRANULOCYTES # BLD AUTO: 0.01 K/UL (ref 0–0.04)
IMM GRANULOCYTES NFR BLD AUTO: 0.2 % (ref 0–0.5)
INR PPP: 2
INTERVENTRICULAR SEPTUM: 1.36 CM (ref 0.6–1.1)
IVRT: 49.73 MSEC
LEFT ATRIUM SIZE: 3.55 CM
LEFT INTERNAL DIMENSION IN SYSTOLE: 1.61 CM (ref 2.1–4)
LEFT VENTRICLE DIASTOLIC VOLUME INDEX: 38.46 ML/M2
LEFT VENTRICLE DIASTOLIC VOLUME: 63.84 ML
LEFT VENTRICLE MASS INDEX: 68 G/M2
LEFT VENTRICLE SYSTOLIC VOLUME INDEX: 13.7 ML/M2
LEFT VENTRICLE SYSTOLIC VOLUME: 22.8 ML
LEFT VENTRICULAR INTERNAL DIMENSION IN DIASTOLE: 3.04 CM (ref 3.5–6)
LEFT VENTRICULAR MASS: 113.4 G
LV LATERAL E/E' RATIO: 15.9 M/S
LV SEPTAL E/E' RATIO: 26.5 M/S
LYMPHOCYTES # BLD AUTO: 1.2 K/UL (ref 1–4.8)
LYMPHOCYTES NFR BLD: 26.3 % (ref 18–48)
MAGNESIUM SERPL-MCNC: 2.1 MG/DL (ref 1.6–2.6)
MCH RBC QN AUTO: 30.8 PG (ref 27–31)
MCHC RBC AUTO-ENTMCNC: 30.7 G/DL (ref 32–36)
MCV RBC AUTO: 100 FL (ref 82–98)
MONOCYTES # BLD AUTO: 0.8 K/UL (ref 0.3–1)
MONOCYTES NFR BLD: 16.1 % (ref 4–15)
MV PEAK A VEL: 1.11 M/S
MV PEAK E VEL: 1.59 M/S
NEUTROPHILS # BLD AUTO: 2.1 K/UL (ref 1.8–7.7)
NEUTROPHILS NFR BLD: 45 % (ref 38–73)
NRBC BLD-RTO: 0 /100 WBC
PISA TR MAX VEL: 2.95 M/S
PLATELET # BLD AUTO: 198 K/UL (ref 150–350)
PMV BLD AUTO: 9.9 FL (ref 9.2–12.9)
POTASSIUM SERPL-SCNC: 3.9 MMOL/L (ref 3.5–5.1)
PROTHROMBIN TIME: 21.6 SEC (ref 10.6–14.8)
PV PEAK VELOCITY: 108.78 CM/S
RA PRESSURE: 3 MMHG
RBC # BLD AUTO: 3.05 M/UL (ref 4–5.4)
RIGHT VENTRICULAR END-DIASTOLIC DIMENSION: 336 CM
SODIUM SERPL-SCNC: 140 MMOL/L (ref 136–145)
TDI LATERAL: 0.1 M/S
TDI SEPTAL: 0.06 M/S
TDI: 0.08 M/S
TR MAX PG: 35 MMHG
TROPONIN I SERPL DL<=0.01 NG/ML-MCNC: 0.08 NG/ML
TV REST PULMONARY ARTERY PRESSURE: 38 MMHG
WBC # BLD AUTO: 4.67 K/UL (ref 3.9–12.7)

## 2021-03-04 PROCEDURE — 27000221 HC OXYGEN, UP TO 24 HOURS

## 2021-03-04 PROCEDURE — 36415 COLL VENOUS BLD VENIPUNCTURE: CPT | Performed by: INTERNAL MEDICINE

## 2021-03-04 PROCEDURE — G0378 HOSPITAL OBSERVATION PER HR: HCPCS

## 2021-03-04 PROCEDURE — 94761 N-INVAS EAR/PLS OXIMETRY MLT: CPT

## 2021-03-04 PROCEDURE — 85610 PROTHROMBIN TIME: CPT | Performed by: INTERNAL MEDICINE

## 2021-03-04 PROCEDURE — 25000003 PHARM REV CODE 250: Performed by: INTERNAL MEDICINE

## 2021-03-04 PROCEDURE — 83735 ASSAY OF MAGNESIUM: CPT | Performed by: INTERNAL MEDICINE

## 2021-03-04 PROCEDURE — 93306 TTE W/DOPPLER COMPLETE: CPT

## 2021-03-04 PROCEDURE — 84484 ASSAY OF TROPONIN QUANT: CPT | Performed by: INTERNAL MEDICINE

## 2021-03-04 PROCEDURE — 99900035 HC TECH TIME PER 15 MIN (STAT)

## 2021-03-04 PROCEDURE — 80048 BASIC METABOLIC PNL TOTAL CA: CPT | Performed by: INTERNAL MEDICINE

## 2021-03-04 PROCEDURE — 99900031 HC PATIENT EDUCATION (STAT)

## 2021-03-04 PROCEDURE — 85025 COMPLETE CBC W/AUTO DIFF WBC: CPT | Performed by: INTERNAL MEDICINE

## 2021-03-04 RX ADMIN — DOCUSATE SODIUM 100 MG: 100 CAPSULE, LIQUID FILLED ORAL at 09:03

## 2021-03-04 RX ADMIN — ATORVASTATIN CALCIUM 40 MG: 40 TABLET, FILM COATED ORAL at 09:03

## 2021-03-04 RX ADMIN — DORZOLAMIDE HYDROCHLORIDE AND TIMOLOL MALEATE 1 DROP: 22.3; 6.8 SOLUTION/ DROPS OPHTHALMIC at 09:03

## 2021-03-04 RX ADMIN — CALCIUM ACETATE 667 MG: 667 CAPSULE ORAL at 09:03

## 2021-03-04 RX ADMIN — LATANOPROST 1 DROP: 50 SOLUTION OPHTHALMIC at 09:03

## 2021-03-04 RX ADMIN — MUPIROCIN: 20 OINTMENT TOPICAL at 09:03

## 2021-03-04 RX ADMIN — WARFARIN SODIUM 1.5 MG: 1 TABLET ORAL at 05:03

## 2021-03-05 VITALS
DIASTOLIC BLOOD PRESSURE: 59 MMHG | WEIGHT: 128.06 LBS | BODY MASS INDEX: 20.58 KG/M2 | OXYGEN SATURATION: 98 % | SYSTOLIC BLOOD PRESSURE: 137 MMHG | RESPIRATION RATE: 18 BRPM | HEIGHT: 66 IN | HEART RATE: 57 BPM | TEMPERATURE: 98 F

## 2021-03-05 PROBLEM — I48.91 ATRIAL FIBRILLATION WITH RVR: Status: RESOLVED | Noted: 2021-03-03 | Resolved: 2021-03-05

## 2021-03-05 LAB
ANION GAP SERPL CALC-SCNC: 13 MMOL/L (ref 8–16)
BASOPHILS # BLD AUTO: 0.02 K/UL (ref 0–0.2)
BASOPHILS NFR BLD: 0.4 % (ref 0–1.9)
BUN SERPL-MCNC: 50 MG/DL (ref 8–23)
CALCIUM SERPL-MCNC: 8.7 MG/DL (ref 8.7–10.5)
CHLORIDE SERPL-SCNC: 99 MMOL/L (ref 95–110)
CO2 SERPL-SCNC: 27 MMOL/L (ref 23–29)
CREAT SERPL-MCNC: 9.6 MG/DL (ref 0.5–1.4)
DIFFERENTIAL METHOD: ABNORMAL
EOSINOPHIL # BLD AUTO: 0.5 K/UL (ref 0–0.5)
EOSINOPHIL NFR BLD: 11 % (ref 0–8)
ERYTHROCYTE [DISTWIDTH] IN BLOOD BY AUTOMATED COUNT: 13.3 % (ref 11.5–14.5)
EST. GFR  (AFRICAN AMERICAN): 4 ML/MIN/1.73 M^2
EST. GFR  (NON AFRICAN AMERICAN): 3.5 ML/MIN/1.73 M^2
GLUCOSE SERPL-MCNC: 83 MG/DL (ref 70–110)
HCT VFR BLD AUTO: 29 % (ref 37–48.5)
HGB BLD-MCNC: 9.1 G/DL (ref 12–16)
IMM GRANULOCYTES # BLD AUTO: 0.01 K/UL (ref 0–0.04)
IMM GRANULOCYTES NFR BLD AUTO: 0.2 % (ref 0–0.5)
INR PPP: 2.8
LYMPHOCYTES # BLD AUTO: 1 K/UL (ref 1–4.8)
LYMPHOCYTES NFR BLD: 20.3 % (ref 18–48)
MAGNESIUM SERPL-MCNC: 2.4 MG/DL (ref 1.6–2.6)
MCH RBC QN AUTO: 31.3 PG (ref 27–31)
MCHC RBC AUTO-ENTMCNC: 31.4 G/DL (ref 32–36)
MCV RBC AUTO: 100 FL (ref 82–98)
MONOCYTES # BLD AUTO: 0.7 K/UL (ref 0.3–1)
MONOCYTES NFR BLD: 14.6 % (ref 4–15)
NEUTROPHILS # BLD AUTO: 2.6 K/UL (ref 1.8–7.7)
NEUTROPHILS NFR BLD: 53.5 % (ref 38–73)
NRBC BLD-RTO: 0 /100 WBC
PLATELET # BLD AUTO: 200 K/UL (ref 150–350)
PMV BLD AUTO: 10.2 FL (ref 9.2–12.9)
POTASSIUM SERPL-SCNC: 4.1 MMOL/L (ref 3.5–5.1)
PROTHROMBIN TIME: 28.2 SEC (ref 10.6–14.8)
RBC # BLD AUTO: 2.91 M/UL (ref 4–5.4)
SODIUM SERPL-SCNC: 139 MMOL/L (ref 136–145)
WBC # BLD AUTO: 4.92 K/UL (ref 3.9–12.7)

## 2021-03-05 PROCEDURE — 90935 HEMODIALYSIS ONE EVALUATION: CPT

## 2021-03-05 PROCEDURE — 94761 N-INVAS EAR/PLS OXIMETRY MLT: CPT

## 2021-03-05 PROCEDURE — 80048 BASIC METABOLIC PNL TOTAL CA: CPT | Performed by: INTERNAL MEDICINE

## 2021-03-05 PROCEDURE — 83735 ASSAY OF MAGNESIUM: CPT | Performed by: INTERNAL MEDICINE

## 2021-03-05 PROCEDURE — 85025 COMPLETE CBC W/AUTO DIFF WBC: CPT | Performed by: INTERNAL MEDICINE

## 2021-03-05 PROCEDURE — G0378 HOSPITAL OBSERVATION PER HR: HCPCS | Mod: CS

## 2021-03-05 PROCEDURE — 27000221 HC OXYGEN, UP TO 24 HOURS

## 2021-03-05 PROCEDURE — 99900035 HC TECH TIME PER 15 MIN (STAT)

## 2021-03-05 PROCEDURE — 36415 COLL VENOUS BLD VENIPUNCTURE: CPT | Performed by: INTERNAL MEDICINE

## 2021-03-05 PROCEDURE — 85610 PROTHROMBIN TIME: CPT | Performed by: INTERNAL MEDICINE

## 2021-03-05 RX ORDER — AMIODARONE HYDROCHLORIDE 200 MG/1
200 TABLET ORAL 2 TIMES DAILY
Qty: 60 TABLET | Refills: 1 | Status: ON HOLD | OUTPATIENT
Start: 2021-03-05 | End: 2021-06-26 | Stop reason: SDUPTHER

## 2021-03-05 RX ADMIN — DORZOLAMIDE HYDROCHLORIDE AND TIMOLOL MALEATE 1 DROP: 22.3; 6.8 SOLUTION/ DROPS OPHTHALMIC at 09:03

## 2021-05-10 ENCOUNTER — LAB VISIT (OUTPATIENT)
Dept: LAB | Facility: HOSPITAL | Age: 81
End: 2021-05-10
Attending: NURSE PRACTITIONER
Payer: MEDICARE

## 2021-05-10 DIAGNOSIS — I48.0 PAROXYSMAL ATRIAL FIBRILLATION: Primary | ICD-10-CM

## 2021-05-10 LAB
INR PPP: 7.4
PROTHROMBIN TIME: 60.6 SEC (ref 11.8–14.3)

## 2021-05-10 PROCEDURE — 36415 COLL VENOUS BLD VENIPUNCTURE: CPT | Performed by: NURSE PRACTITIONER

## 2021-05-10 PROCEDURE — 85610 PROTHROMBIN TIME: CPT | Performed by: NURSE PRACTITIONER

## 2021-05-19 DIAGNOSIS — Z12.31 OTHER SCREENING MAMMOGRAM: Primary | ICD-10-CM

## 2021-05-26 ENCOUNTER — HOSPITAL ENCOUNTER (OUTPATIENT)
Dept: RADIOLOGY | Facility: HOSPITAL | Age: 81
Discharge: HOME | End: 2021-05-26
Attending: INTERNAL MEDICINE
Payer: MEDICARE

## 2021-05-26 VITALS — WEIGHT: 128.06 LBS | HEIGHT: 65 IN | BODY MASS INDEX: 21.34 KG/M2

## 2021-05-26 DIAGNOSIS — Z12.31 OTHER SCREENING MAMMOGRAM: ICD-10-CM

## 2021-05-26 PROCEDURE — 77067 SCR MAMMO BI INCL CAD: CPT | Mod: TC,PO

## 2021-06-07 ENCOUNTER — OFFICE VISIT (OUTPATIENT)
Dept: FAMILY MEDICINE | Facility: CLINIC | Age: 81
End: 2021-06-07
Payer: MEDICARE

## 2021-06-07 VITALS
BODY MASS INDEX: 20.99 KG/M2 | WEIGHT: 126 LBS | DIASTOLIC BLOOD PRESSURE: 57 MMHG | SYSTOLIC BLOOD PRESSURE: 138 MMHG | TEMPERATURE: 99 F | HEIGHT: 65 IN | HEART RATE: 62 BPM | RESPIRATION RATE: 18 BRPM

## 2021-06-07 DIAGNOSIS — I25.118 CORONARY ARTERY DISEASE OF NATIVE HEART WITH STABLE ANGINA PECTORIS, UNSPECIFIED VESSEL OR LESION TYPE: ICD-10-CM

## 2021-06-07 DIAGNOSIS — N18.6 BENIGN HYPERTENSION WITH ESRD (END-STAGE RENAL DISEASE): ICD-10-CM

## 2021-06-07 DIAGNOSIS — R05.9 COUGH: Primary | ICD-10-CM

## 2021-06-07 DIAGNOSIS — I12.0 BENIGN HYPERTENSION WITH ESRD (END-STAGE RENAL DISEASE): ICD-10-CM

## 2021-06-07 DIAGNOSIS — I48.0 PAROXYSMAL ATRIAL FIBRILLATION: ICD-10-CM

## 2021-06-07 DIAGNOSIS — R06.02 SHORTNESS OF BREATH: ICD-10-CM

## 2021-06-07 DIAGNOSIS — E78.2 MULTIPLE-TYPE HYPERLIPIDEMIA: ICD-10-CM

## 2021-06-07 PROCEDURE — 3075F SYST BP GE 130 - 139MM HG: CPT | Mod: S$GLB,,, | Performed by: INTERNAL MEDICINE

## 2021-06-07 PROCEDURE — 1101F PR PT FALLS ASSESS DOC 0-1 FALLS W/OUT INJ PAST YR: ICD-10-PCS | Mod: S$GLB,,, | Performed by: INTERNAL MEDICINE

## 2021-06-07 PROCEDURE — 99214 PR OFFICE/OUTPT VISIT, EST, LEVL IV, 30-39 MIN: ICD-10-PCS | Mod: S$GLB,,, | Performed by: INTERNAL MEDICINE

## 2021-06-07 PROCEDURE — 1126F AMNT PAIN NOTED NONE PRSNT: CPT | Mod: S$GLB,,, | Performed by: INTERNAL MEDICINE

## 2021-06-07 PROCEDURE — 1126F PR PAIN SEVERITY QUANTIFIED, NO PAIN PRESENT: ICD-10-PCS | Mod: S$GLB,,, | Performed by: INTERNAL MEDICINE

## 2021-06-07 PROCEDURE — 1159F PR MEDICATION LIST DOCUMENTED IN MEDICAL RECORD: ICD-10-PCS | Mod: S$GLB,,, | Performed by: INTERNAL MEDICINE

## 2021-06-07 PROCEDURE — 3288F FALL RISK ASSESSMENT DOCD: CPT | Mod: S$GLB,,, | Performed by: INTERNAL MEDICINE

## 2021-06-07 PROCEDURE — 3288F PR FALLS RISK ASSESSMENT DOCUMENTED: ICD-10-PCS | Mod: S$GLB,,, | Performed by: INTERNAL MEDICINE

## 2021-06-07 PROCEDURE — 1159F MED LIST DOCD IN RCRD: CPT | Mod: S$GLB,,, | Performed by: INTERNAL MEDICINE

## 2021-06-07 PROCEDURE — 3078F PR MOST RECENT DIASTOLIC BLOOD PRESSURE < 80 MM HG: ICD-10-PCS | Mod: S$GLB,,, | Performed by: INTERNAL MEDICINE

## 2021-06-07 PROCEDURE — 3078F DIAST BP <80 MM HG: CPT | Mod: S$GLB,,, | Performed by: INTERNAL MEDICINE

## 2021-06-07 PROCEDURE — 3075F PR MOST RECENT SYSTOLIC BLOOD PRESS GE 130-139MM HG: ICD-10-PCS | Mod: S$GLB,,, | Performed by: INTERNAL MEDICINE

## 2021-06-07 PROCEDURE — 99214 OFFICE O/P EST MOD 30 MIN: CPT | Mod: S$GLB,,, | Performed by: INTERNAL MEDICINE

## 2021-06-07 PROCEDURE — 1101F PT FALLS ASSESS-DOCD LE1/YR: CPT | Mod: S$GLB,,, | Performed by: INTERNAL MEDICINE

## 2021-06-23 ENCOUNTER — HOSPITAL ENCOUNTER (OUTPATIENT)
Dept: RADIOLOGY | Facility: HOSPITAL | Age: 81
Discharge: HOME | End: 2021-06-23
Attending: INTERNAL MEDICINE
Payer: MEDICARE

## 2021-06-23 DIAGNOSIS — R92.2 INCONCLUSIVE MAMMOGRAM: ICD-10-CM

## 2021-06-23 PROCEDURE — 76642 ULTRASOUND BREAST LIMITED: CPT | Mod: TC,PO,RT

## 2021-06-23 PROCEDURE — 77061 BREAST TOMOSYNTHESIS UNI: CPT | Mod: TC,PO,RT

## 2021-06-25 ENCOUNTER — HOSPITAL ENCOUNTER (OUTPATIENT)
Facility: HOSPITAL | Age: 81
Discharge: HOME | End: 2021-06-26
Attending: EMERGENCY MEDICINE
Payer: MEDICARE

## 2021-06-25 DIAGNOSIS — N18.6 END STAGE RENAL DISEASE ON DIALYSIS: ICD-10-CM

## 2021-06-25 DIAGNOSIS — Z99.2 END STAGE RENAL DISEASE ON DIALYSIS: ICD-10-CM

## 2021-06-25 DIAGNOSIS — R06.02 SHORTNESS OF BREATH: ICD-10-CM

## 2021-06-25 DIAGNOSIS — R53.1 GENERALIZED WEAKNESS: Primary | ICD-10-CM

## 2021-06-25 LAB
ALBUMIN SERPL BCP-MCNC: 3.9 G/DL (ref 3.5–5.2)
ALP SERPL-CCNC: 57 U/L (ref 55–135)
ALT SERPL W/O P-5'-P-CCNC: 23 U/L (ref 10–44)
ANION GAP SERPL CALC-SCNC: 14 MMOL/L (ref 8–16)
AST SERPL-CCNC: 21 U/L (ref 10–40)
BASOPHILS # BLD AUTO: 0.03 K/UL (ref 0–0.2)
BASOPHILS NFR BLD: 0.5 % (ref 0–1.9)
BILIRUB SERPL-MCNC: 1 MG/DL (ref 0.1–1)
BNP SERPL-MCNC: 694 PG/ML (ref 0–99)
BUN SERPL-MCNC: 13 MG/DL (ref 8–23)
CALCIUM SERPL-MCNC: 8.8 MG/DL (ref 8.7–10.5)
CHLORIDE SERPL-SCNC: 90 MMOL/L (ref 95–110)
CO2 SERPL-SCNC: 34 MMOL/L (ref 23–29)
CREAT SERPL-MCNC: 4.9 MG/DL (ref 0.5–1.4)
DIFFERENTIAL METHOD: ABNORMAL
EOSINOPHIL # BLD AUTO: 0.5 K/UL (ref 0–0.5)
EOSINOPHIL NFR BLD: 7.7 % (ref 0–8)
ERYTHROCYTE [DISTWIDTH] IN BLOOD BY AUTOMATED COUNT: 13.8 % (ref 11.5–14.5)
EST. GFR  (AFRICAN AMERICAN): 8.9 ML/MIN/1.73 M^2
EST. GFR  (NON AFRICAN AMERICAN): 7.8 ML/MIN/1.73 M^2
GLUCOSE SERPL-MCNC: 86 MG/DL (ref 70–110)
HCT VFR BLD AUTO: 34.3 % (ref 37–48.5)
HGB BLD-MCNC: 10.6 G/DL (ref 12–16)
IMM GRANULOCYTES # BLD AUTO: 0.02 K/UL (ref 0–0.04)
IMM GRANULOCYTES NFR BLD AUTO: 0.3 % (ref 0–0.5)
LYMPHOCYTES # BLD AUTO: 1.1 K/UL (ref 1–4.8)
LYMPHOCYTES NFR BLD: 18 % (ref 18–48)
MCH RBC QN AUTO: 30.6 PG (ref 27–31)
MCHC RBC AUTO-ENTMCNC: 30.9 G/DL (ref 32–36)
MCV RBC AUTO: 99 FL (ref 82–98)
MONOCYTES # BLD AUTO: 1.2 K/UL (ref 0.3–1)
MONOCYTES NFR BLD: 19.4 % (ref 4–15)
NEUTROPHILS # BLD AUTO: 3.2 K/UL (ref 1.8–7.7)
NEUTROPHILS NFR BLD: 54.1 % (ref 38–73)
NRBC BLD-RTO: 0 /100 WBC
PLATELET # BLD AUTO: 277 K/UL (ref 150–450)
PMV BLD AUTO: 9.8 FL (ref 9.2–12.9)
POTASSIUM SERPL-SCNC: 3.4 MMOL/L (ref 3.5–5.1)
PROT SERPL-MCNC: 7.3 G/DL (ref 6–8.4)
RBC # BLD AUTO: 3.46 M/UL (ref 4–5.4)
SARS-COV-2 RDRP RESP QL NAA+PROBE: NEGATIVE
SODIUM SERPL-SCNC: 138 MMOL/L (ref 136–145)
TROPONIN I SERPL DL<=0.01 NG/ML-MCNC: 0.03 NG/ML
WBC # BLD AUTO: 5.94 K/UL (ref 3.9–12.7)

## 2021-06-25 PROCEDURE — G0378 HOSPITAL OBSERVATION PER HR: HCPCS | Mod: CS

## 2021-06-25 PROCEDURE — G0378 HOSPITAL OBSERVATION PER HR: HCPCS

## 2021-06-25 PROCEDURE — 85025 COMPLETE CBC W/AUTO DIFF WBC: CPT | Performed by: EMERGENCY MEDICINE

## 2021-06-25 PROCEDURE — 83880 ASSAY OF NATRIURETIC PEPTIDE: CPT | Performed by: EMERGENCY MEDICINE

## 2021-06-25 PROCEDURE — 93010 ELECTROCARDIOGRAM REPORT: CPT | Mod: ,,, | Performed by: INTERNAL MEDICINE

## 2021-06-25 PROCEDURE — U0002 COVID-19 LAB TEST NON-CDC: HCPCS | Performed by: EMERGENCY MEDICINE

## 2021-06-25 PROCEDURE — 99285 EMERGENCY DEPT VISIT HI MDM: CPT | Mod: 25

## 2021-06-25 PROCEDURE — 93010 EKG 12-LEAD: ICD-10-PCS | Mod: ,,, | Performed by: INTERNAL MEDICINE

## 2021-06-25 PROCEDURE — 80053 COMPREHEN METABOLIC PANEL: CPT | Performed by: EMERGENCY MEDICINE

## 2021-06-25 PROCEDURE — 93005 ELECTROCARDIOGRAM TRACING: CPT | Performed by: INTERNAL MEDICINE

## 2021-06-25 PROCEDURE — 84484 ASSAY OF TROPONIN QUANT: CPT | Performed by: EMERGENCY MEDICINE

## 2021-06-25 RX ORDER — WARFARIN 1 MG/1
3 TABLET ORAL
Status: DISCONTINUED | OUTPATIENT
Start: 2021-06-27 | End: 2021-06-26

## 2021-06-25 RX ORDER — POTASSIUM CHLORIDE 7.45 MG/ML
20 INJECTION INTRAVENOUS
Status: DISCONTINUED | OUTPATIENT
Start: 2021-06-26 | End: 2021-06-26 | Stop reason: HOSPADM

## 2021-06-25 RX ORDER — SODIUM CHLORIDE 0.9 % (FLUSH) 0.9 %
10 SYRINGE (ML) INJECTION
Status: DISCONTINUED | OUTPATIENT
Start: 2021-06-25 | End: 2021-06-26 | Stop reason: HOSPADM

## 2021-06-25 RX ORDER — POTASSIUM CHLORIDE 7.45 MG/ML
40 INJECTION INTRAVENOUS
Status: DISCONTINUED | OUTPATIENT
Start: 2021-06-26 | End: 2021-06-26 | Stop reason: HOSPADM

## 2021-06-25 RX ORDER — ONDANSETRON 2 MG/ML
4 INJECTION INTRAMUSCULAR; INTRAVENOUS EVERY 8 HOURS PRN
Status: DISCONTINUED | OUTPATIENT
Start: 2021-06-25 | End: 2021-06-26 | Stop reason: HOSPADM

## 2021-06-25 RX ORDER — ATORVASTATIN CALCIUM 40 MG/1
40 TABLET, FILM COATED ORAL DAILY
Status: DISCONTINUED | OUTPATIENT
Start: 2021-06-26 | End: 2021-06-26 | Stop reason: HOSPADM

## 2021-06-25 RX ORDER — MAGNESIUM SULFATE HEPTAHYDRATE 40 MG/ML
2 INJECTION, SOLUTION INTRAVENOUS
Status: DISCONTINUED | OUTPATIENT
Start: 2021-06-26 | End: 2021-06-26 | Stop reason: HOSPADM

## 2021-06-25 RX ORDER — POLYETHYLENE GLYCOL 3350 17 G/17G
17 POWDER, FOR SOLUTION ORAL DAILY
Status: DISCONTINUED | OUTPATIENT
Start: 2021-06-26 | End: 2021-06-26 | Stop reason: HOSPADM

## 2021-06-25 RX ORDER — POTASSIUM CHLORIDE 20 MEQ/1
20 TABLET, EXTENDED RELEASE ORAL
Status: DISCONTINUED | OUTPATIENT
Start: 2021-06-26 | End: 2021-06-26 | Stop reason: HOSPADM

## 2021-06-25 RX ORDER — AMLODIPINE BESYLATE 5 MG/1
5 TABLET ORAL DAILY
Status: DISCONTINUED | OUTPATIENT
Start: 2021-06-26 | End: 2021-06-26 | Stop reason: HOSPADM

## 2021-06-25 RX ORDER — ACETAMINOPHEN 325 MG/1
650 TABLET ORAL EVERY 8 HOURS PRN
Status: DISCONTINUED | OUTPATIENT
Start: 2021-06-25 | End: 2021-06-26 | Stop reason: HOSPADM

## 2021-06-25 RX ORDER — TALC
6 POWDER (GRAM) TOPICAL NIGHTLY PRN
Status: DISCONTINUED | OUTPATIENT
Start: 2021-06-25 | End: 2021-06-26 | Stop reason: HOSPADM

## 2021-06-25 RX ORDER — LANOLIN ALCOHOL/MO/W.PET/CERES
800 CREAM (GRAM) TOPICAL
Status: DISCONTINUED | OUTPATIENT
Start: 2021-06-26 | End: 2021-06-26 | Stop reason: HOSPADM

## 2021-06-25 RX ORDER — AMIODARONE HYDROCHLORIDE 200 MG/1
200 TABLET ORAL 2 TIMES DAILY
Status: DISCONTINUED | OUTPATIENT
Start: 2021-06-25 | End: 2021-06-26

## 2021-06-25 RX ORDER — MAGNESIUM SULFATE 1 G/100ML
1 INJECTION INTRAVENOUS
Status: DISCONTINUED | OUTPATIENT
Start: 2021-06-26 | End: 2021-06-26 | Stop reason: HOSPADM

## 2021-06-25 RX ORDER — MAGNESIUM SULFATE HEPTAHYDRATE 40 MG/ML
4 INJECTION, SOLUTION INTRAVENOUS
Status: DISCONTINUED | OUTPATIENT
Start: 2021-06-26 | End: 2021-06-26 | Stop reason: HOSPADM

## 2021-06-25 RX ORDER — POTASSIUM CHLORIDE 20 MEQ/1
40 TABLET, EXTENDED RELEASE ORAL
Status: DISCONTINUED | OUTPATIENT
Start: 2021-06-26 | End: 2021-06-26 | Stop reason: HOSPADM

## 2021-06-26 VITALS
BODY MASS INDEX: 20.94 KG/M2 | WEIGHT: 125.69 LBS | TEMPERATURE: 99 F | DIASTOLIC BLOOD PRESSURE: 61 MMHG | HEART RATE: 54 BPM | SYSTOLIC BLOOD PRESSURE: 109 MMHG | RESPIRATION RATE: 17 BRPM | OXYGEN SATURATION: 97 % | HEIGHT: 65 IN

## 2021-06-26 LAB
ANION GAP SERPL CALC-SCNC: 13 MMOL/L (ref 8–16)
BASOPHILS # BLD AUTO: 0.03 K/UL (ref 0–0.2)
BASOPHILS NFR BLD: 0.6 % (ref 0–1.9)
BUN SERPL-MCNC: 18 MG/DL (ref 8–23)
CALCIUM SERPL-MCNC: 8.4 MG/DL (ref 8.7–10.5)
CHLORIDE SERPL-SCNC: 96 MMOL/L (ref 95–110)
CO2 SERPL-SCNC: 31 MMOL/L (ref 23–29)
CREAT SERPL-MCNC: 5.7 MG/DL (ref 0.5–1.4)
DIFFERENTIAL METHOD: ABNORMAL
EOSINOPHIL # BLD AUTO: 0.5 K/UL (ref 0–0.5)
EOSINOPHIL NFR BLD: 10.1 % (ref 0–8)
ERYTHROCYTE [DISTWIDTH] IN BLOOD BY AUTOMATED COUNT: 13.4 % (ref 11.5–14.5)
EST. GFR  (AFRICAN AMERICAN): 7.4 ML/MIN/1.73 M^2
EST. GFR  (NON AFRICAN AMERICAN): 6.5 ML/MIN/1.73 M^2
GLUCOSE SERPL-MCNC: 77 MG/DL (ref 70–110)
GLUCOSE SERPL-MCNC: 80 MG/DL (ref 70–110)
HCT VFR BLD AUTO: 30.7 % (ref 37–48.5)
HGB BLD-MCNC: 9.5 G/DL (ref 12–16)
IMM GRANULOCYTES # BLD AUTO: 0.02 K/UL (ref 0–0.04)
IMM GRANULOCYTES NFR BLD AUTO: 0.4 % (ref 0–0.5)
INR PPP: 2.9
LYMPHOCYTES # BLD AUTO: 1.4 K/UL (ref 1–4.8)
LYMPHOCYTES NFR BLD: 28.8 % (ref 18–48)
MAGNESIUM SERPL-MCNC: 2.1 MG/DL (ref 1.6–2.6)
MCH RBC QN AUTO: 30.3 PG (ref 27–31)
MCHC RBC AUTO-ENTMCNC: 30.9 G/DL (ref 32–36)
MCV RBC AUTO: 98 FL (ref 82–98)
MONOCYTES # BLD AUTO: 0.8 K/UL (ref 0.3–1)
MONOCYTES NFR BLD: 16.6 % (ref 4–15)
NEUTROPHILS # BLD AUTO: 2.1 K/UL (ref 1.8–7.7)
NEUTROPHILS NFR BLD: 43.5 % (ref 38–73)
NRBC BLD-RTO: 0 /100 WBC
PLATELET # BLD AUTO: 239 K/UL (ref 150–450)
PMV BLD AUTO: 9.9 FL (ref 9.2–12.9)
POTASSIUM SERPL-SCNC: 3.5 MMOL/L (ref 3.5–5.1)
PROTHROMBIN TIME: 29 SEC (ref 11.8–14.3)
RBC # BLD AUTO: 3.14 M/UL (ref 4–5.4)
SODIUM SERPL-SCNC: 140 MMOL/L (ref 136–145)
WBC # BLD AUTO: 4.83 K/UL (ref 3.9–12.7)

## 2021-06-26 PROCEDURE — G0378 HOSPITAL OBSERVATION PER HR: HCPCS | Mod: CS

## 2021-06-26 PROCEDURE — 85610 PROTHROMBIN TIME: CPT | Performed by: STUDENT IN AN ORGANIZED HEALTH CARE EDUCATION/TRAINING PROGRAM

## 2021-06-26 PROCEDURE — 25000003 PHARM REV CODE 250: Performed by: STUDENT IN AN ORGANIZED HEALTH CARE EDUCATION/TRAINING PROGRAM

## 2021-06-26 PROCEDURE — 80048 BASIC METABOLIC PNL TOTAL CA: CPT | Performed by: STUDENT IN AN ORGANIZED HEALTH CARE EDUCATION/TRAINING PROGRAM

## 2021-06-26 PROCEDURE — 83735 ASSAY OF MAGNESIUM: CPT | Performed by: STUDENT IN AN ORGANIZED HEALTH CARE EDUCATION/TRAINING PROGRAM

## 2021-06-26 PROCEDURE — 94761 N-INVAS EAR/PLS OXIMETRY MLT: CPT

## 2021-06-26 PROCEDURE — 85025 COMPLETE CBC W/AUTO DIFF WBC: CPT | Performed by: STUDENT IN AN ORGANIZED HEALTH CARE EDUCATION/TRAINING PROGRAM

## 2021-06-26 PROCEDURE — 99900035 HC TECH TIME PER 15 MIN (STAT)

## 2021-06-26 PROCEDURE — 99900031 HC PATIENT EDUCATION (STAT)

## 2021-06-26 PROCEDURE — 36415 COLL VENOUS BLD VENIPUNCTURE: CPT | Performed by: STUDENT IN AN ORGANIZED HEALTH CARE EDUCATION/TRAINING PROGRAM

## 2021-06-26 RX ORDER — AMIODARONE HYDROCHLORIDE 200 MG/1
200 TABLET ORAL DAILY
Qty: 60 TABLET | Refills: 1
Start: 2021-06-26 | End: 2021-10-11

## 2021-06-26 RX ORDER — AMIODARONE HYDROCHLORIDE 200 MG/1
200 TABLET ORAL DAILY
Status: DISCONTINUED | OUTPATIENT
Start: 2021-06-27 | End: 2021-06-26 | Stop reason: HOSPADM

## 2021-06-26 RX ORDER — SODIUM CHLORIDE 9 MG/ML
INJECTION, SOLUTION INTRAVENOUS ONCE
Status: DISCONTINUED | OUTPATIENT
Start: 2021-06-26 | End: 2021-06-26 | Stop reason: HOSPADM

## 2021-06-26 RX ORDER — MUPIROCIN 20 MG/G
OINTMENT TOPICAL 2 TIMES DAILY
Status: DISCONTINUED | OUTPATIENT
Start: 2021-06-26 | End: 2021-06-26 | Stop reason: HOSPADM

## 2021-06-26 RX ADMIN — POTASSIUM CHLORIDE 20 MEQ: 20 TABLET, EXTENDED RELEASE ORAL at 08:06

## 2021-06-26 RX ADMIN — AMIODARONE HYDROCHLORIDE 200 MG: 200 TABLET ORAL at 08:06

## 2021-06-26 RX ADMIN — AMLODIPINE BESYLATE 5 MG: 5 TABLET ORAL at 08:06

## 2021-06-26 RX ADMIN — AMIODARONE HYDROCHLORIDE 200 MG: 200 TABLET ORAL at 12:06

## 2021-06-26 RX ADMIN — POLYETHYLENE GLYCOL 3350 17 G: 17 POWDER, FOR SOLUTION ORAL at 08:06

## 2021-06-26 RX ADMIN — ATORVASTATIN CALCIUM 40 MG: 40 TABLET, FILM COATED ORAL at 08:06

## 2021-06-28 ENCOUNTER — TELEPHONE (OUTPATIENT)
Dept: FAMILY MEDICINE | Facility: CLINIC | Age: 81
End: 2021-06-28

## 2021-07-07 DIAGNOSIS — R06.02 SHORTNESS OF BREATH: Primary | ICD-10-CM

## 2021-07-28 ENCOUNTER — HOSPITAL ENCOUNTER (OUTPATIENT)
Dept: PULMONOLOGY | Facility: HOSPITAL | Age: 81
Discharge: HOME | End: 2021-07-28
Attending: SPECIALIST
Payer: MEDICARE

## 2021-07-28 DIAGNOSIS — R06.02 SHORTNESS OF BREATH: ICD-10-CM

## 2021-07-28 PROCEDURE — 94010 BREATHING CAPACITY TEST: CPT

## 2021-07-28 PROCEDURE — 94729 DIFFUSING CAPACITY: CPT

## 2021-07-28 PROCEDURE — 94727 GAS DIL/WSHOT DETER LNG VOL: CPT

## 2021-07-28 PROCEDURE — 94060 EVALUATION OF WHEEZING: CPT

## 2021-08-20 PROBLEM — R94.2 OBSTRUCTIVE PATTERN PRESENT ON PULMONARY FUNCTION TESTING: Status: ACTIVE | Noted: 2021-07-28

## 2021-10-11 ENCOUNTER — OFFICE VISIT (OUTPATIENT)
Dept: FAMILY MEDICINE | Facility: CLINIC | Age: 81
End: 2021-10-11
Payer: MEDICARE

## 2021-10-11 VITALS
HEIGHT: 65 IN | HEART RATE: 87 BPM | DIASTOLIC BLOOD PRESSURE: 70 MMHG | TEMPERATURE: 99 F | BODY MASS INDEX: 20.49 KG/M2 | SYSTOLIC BLOOD PRESSURE: 134 MMHG | WEIGHT: 123 LBS

## 2021-10-11 DIAGNOSIS — E78.2 MULTIPLE-TYPE HYPERLIPIDEMIA: ICD-10-CM

## 2021-10-11 DIAGNOSIS — I48.0 PAROXYSMAL ATRIAL FIBRILLATION: ICD-10-CM

## 2021-10-11 DIAGNOSIS — N18.6 STAGE 5 CHRONIC KIDNEY DISEASE ON CHRONIC DIALYSIS: ICD-10-CM

## 2021-10-11 DIAGNOSIS — Z79.01 CHRONIC ANTICOAGULATION: ICD-10-CM

## 2021-10-11 DIAGNOSIS — N18.6 BENIGN HYPERTENSION WITH ESRD (END-STAGE RENAL DISEASE): Primary | ICD-10-CM

## 2021-10-11 DIAGNOSIS — I25.118 CORONARY ARTERY DISEASE OF NATIVE HEART WITH STABLE ANGINA PECTORIS, UNSPECIFIED VESSEL OR LESION TYPE: ICD-10-CM

## 2021-10-11 DIAGNOSIS — Z99.2 STAGE 5 CHRONIC KIDNEY DISEASE ON CHRONIC DIALYSIS: ICD-10-CM

## 2021-10-11 DIAGNOSIS — I12.0 BENIGN HYPERTENSION WITH ESRD (END-STAGE RENAL DISEASE): Primary | ICD-10-CM

## 2021-10-11 DIAGNOSIS — Z23 NEED FOR INFLUENZA VACCINATION: ICD-10-CM

## 2021-10-11 PROCEDURE — 3288F FALL RISK ASSESSMENT DOCD: CPT | Mod: S$GLB,,, | Performed by: INTERNAL MEDICINE

## 2021-10-11 PROCEDURE — 90662 FLU VACCINE - QUADRIVALENT - HIGH DOSE (65+) PRESERVATIVE FREE IM: ICD-10-PCS | Mod: S$GLB,,, | Performed by: INTERNAL MEDICINE

## 2021-10-11 PROCEDURE — 1126F AMNT PAIN NOTED NONE PRSNT: CPT | Mod: S$GLB,,, | Performed by: INTERNAL MEDICINE

## 2021-10-11 PROCEDURE — 1101F PR PT FALLS ASSESS DOC 0-1 FALLS W/OUT INJ PAST YR: ICD-10-PCS | Mod: S$GLB,,, | Performed by: INTERNAL MEDICINE

## 2021-10-11 PROCEDURE — 90662 IIV NO PRSV INCREASED AG IM: CPT | Mod: S$GLB,,, | Performed by: INTERNAL MEDICINE

## 2021-10-11 PROCEDURE — 1159F PR MEDICATION LIST DOCUMENTED IN MEDICAL RECORD: ICD-10-PCS | Mod: S$GLB,,, | Performed by: INTERNAL MEDICINE

## 2021-10-11 PROCEDURE — G0008 FLU VACCINE - QUADRIVALENT - HIGH DOSE (65+) PRESERVATIVE FREE IM: ICD-10-PCS | Mod: S$GLB,,, | Performed by: INTERNAL MEDICINE

## 2021-10-11 PROCEDURE — 1101F PT FALLS ASSESS-DOCD LE1/YR: CPT | Mod: S$GLB,,, | Performed by: INTERNAL MEDICINE

## 2021-10-11 PROCEDURE — G0008 ADMIN INFLUENZA VIRUS VAC: HCPCS | Mod: S$GLB,,, | Performed by: INTERNAL MEDICINE

## 2021-10-11 PROCEDURE — 99214 PR OFFICE/OUTPT VISIT, EST, LEVL IV, 30-39 MIN: ICD-10-PCS | Mod: 25,S$GLB,, | Performed by: INTERNAL MEDICINE

## 2021-10-11 PROCEDURE — 99214 OFFICE O/P EST MOD 30 MIN: CPT | Mod: 25,S$GLB,, | Performed by: INTERNAL MEDICINE

## 2021-10-11 PROCEDURE — 1160F RVW MEDS BY RX/DR IN RCRD: CPT | Mod: S$GLB,,, | Performed by: INTERNAL MEDICINE

## 2021-10-11 PROCEDURE — 1126F PR PAIN SEVERITY QUANTIFIED, NO PAIN PRESENT: ICD-10-PCS | Mod: S$GLB,,, | Performed by: INTERNAL MEDICINE

## 2021-10-11 PROCEDURE — 1159F MED LIST DOCD IN RCRD: CPT | Mod: S$GLB,,, | Performed by: INTERNAL MEDICINE

## 2021-10-11 PROCEDURE — 3288F PR FALLS RISK ASSESSMENT DOCUMENTED: ICD-10-PCS | Mod: S$GLB,,, | Performed by: INTERNAL MEDICINE

## 2021-10-11 PROCEDURE — 1160F PR REVIEW ALL MEDS BY PRESCRIBER/CLIN PHARMACIST DOCUMENTED: ICD-10-PCS | Mod: S$GLB,,, | Performed by: INTERNAL MEDICINE

## 2022-01-26 ENCOUNTER — HOSPITAL ENCOUNTER (OUTPATIENT)
Facility: HOSPITAL | Age: 82
Discharge: HOME | End: 2022-01-29
Attending: EMERGENCY MEDICINE | Admitting: FAMILY MEDICINE
Payer: MEDICARE

## 2022-01-26 DIAGNOSIS — I48.91 ATRIAL FIBRILLATION WITH RAPID VENTRICULAR RESPONSE: ICD-10-CM

## 2022-01-26 DIAGNOSIS — E87.6 HYPOKALEMIA: ICD-10-CM

## 2022-01-26 DIAGNOSIS — R53.1 GENERALIZED WEAKNESS: ICD-10-CM

## 2022-01-26 DIAGNOSIS — R07.9 CHEST PAIN: Primary | ICD-10-CM

## 2022-01-26 DIAGNOSIS — R07.9 CHEST PAIN, UNSPECIFIED TYPE: ICD-10-CM

## 2022-01-26 DIAGNOSIS — R55 SYNCOPE, UNSPECIFIED SYNCOPE TYPE: ICD-10-CM

## 2022-01-26 DIAGNOSIS — R06.02 SOB (SHORTNESS OF BREATH): ICD-10-CM

## 2022-01-26 DIAGNOSIS — N18.6 ESRD (END STAGE RENAL DISEASE): ICD-10-CM

## 2022-01-26 DIAGNOSIS — I48.91 A-FIB: ICD-10-CM

## 2022-01-26 LAB
ALBUMIN SERPL BCP-MCNC: 3.6 G/DL (ref 3.5–5.2)
ALP SERPL-CCNC: 63 U/L (ref 55–135)
ALT SERPL W/O P-5'-P-CCNC: 27 U/L (ref 10–44)
ANION GAP SERPL CALC-SCNC: 13 MMOL/L (ref 8–16)
APTT PPP: 34.5 SEC (ref 23.3–35.1)
AST SERPL-CCNC: 23 U/L (ref 10–40)
BASOPHILS # BLD AUTO: 0.03 K/UL (ref 0–0.2)
BASOPHILS NFR BLD: 0.5 % (ref 0–1.9)
BILIRUB SERPL-MCNC: 0.9 MG/DL (ref 0.1–1)
BNP SERPL-MCNC: 339 PG/ML (ref 0–99)
BUN SERPL-MCNC: 19 MG/DL (ref 8–23)
CALCIUM SERPL-MCNC: 8.4 MG/DL (ref 8.7–10.5)
CHLORIDE SERPL-SCNC: 98 MMOL/L (ref 95–110)
CO2 SERPL-SCNC: 28 MMOL/L (ref 23–29)
CREAT SERPL-MCNC: 4.4 MG/DL (ref 0.5–1.4)
DIFFERENTIAL METHOD: ABNORMAL
EOSINOPHIL # BLD AUTO: 0.7 K/UL (ref 0–0.5)
EOSINOPHIL NFR BLD: 10.5 % (ref 0–8)
ERYTHROCYTE [DISTWIDTH] IN BLOOD BY AUTOMATED COUNT: 13.4 % (ref 11.5–14.5)
EST. GFR  (AFRICAN AMERICAN): 10.2 ML/MIN/1.73 M^2
EST. GFR  (NON AFRICAN AMERICAN): 8.8 ML/MIN/1.73 M^2
GLUCOSE SERPL-MCNC: 76 MG/DL (ref 70–110)
HCT VFR BLD AUTO: 37.4 % (ref 37–48.5)
HGB BLD-MCNC: 11.7 G/DL (ref 12–16)
IMM GRANULOCYTES # BLD AUTO: 0.02 K/UL (ref 0–0.04)
IMM GRANULOCYTES NFR BLD AUTO: 0.3 % (ref 0–0.5)
INR PPP: 2.4
LYMPHOCYTES # BLD AUTO: 1.5 K/UL (ref 1–4.8)
LYMPHOCYTES NFR BLD: 23.2 % (ref 18–48)
MAGNESIUM SERPL-MCNC: 1.9 MG/DL (ref 1.6–2.6)
MCH RBC QN AUTO: 30.4 PG (ref 27–31)
MCHC RBC AUTO-ENTMCNC: 31.3 G/DL (ref 32–36)
MCV RBC AUTO: 97 FL (ref 82–98)
MONOCYTES # BLD AUTO: 0.8 K/UL (ref 0.3–1)
MONOCYTES NFR BLD: 11.9 % (ref 4–15)
NEUTROPHILS # BLD AUTO: 3.4 K/UL (ref 1.8–7.7)
NEUTROPHILS NFR BLD: 53.6 % (ref 38–73)
NRBC BLD-RTO: 0 /100 WBC
PLATELET # BLD AUTO: 250 K/UL (ref 150–450)
PMV BLD AUTO: 9.3 FL (ref 9.2–12.9)
POTASSIUM SERPL-SCNC: 3.1 MMOL/L (ref 3.5–5.1)
PROT SERPL-MCNC: 6.9 G/DL (ref 6–8.4)
PROTHROMBIN TIME: 24.9 SEC (ref 11.4–13.7)
RBC # BLD AUTO: 3.85 M/UL (ref 4–5.4)
SARS-COV-2 RDRP RESP QL NAA+PROBE: NEGATIVE
SODIUM SERPL-SCNC: 139 MMOL/L (ref 136–145)
TROPONIN I SERPL DL<=0.01 NG/ML-MCNC: 0.03 NG/ML
TROPONIN I SERPL DL<=0.01 NG/ML-MCNC: 0.47 NG/ML
WBC # BLD AUTO: 6.29 K/UL (ref 3.9–12.7)

## 2022-01-26 PROCEDURE — 85025 COMPLETE CBC W/AUTO DIFF WBC: CPT | Performed by: EMERGENCY MEDICINE

## 2022-01-26 PROCEDURE — 80053 COMPREHEN METABOLIC PANEL: CPT | Performed by: EMERGENCY MEDICINE

## 2022-01-26 PROCEDURE — 93005 ELECTROCARDIOGRAM TRACING: CPT | Performed by: SPECIALIST

## 2022-01-26 PROCEDURE — U0002 COVID-19 LAB TEST NON-CDC: HCPCS | Performed by: EMERGENCY MEDICINE

## 2022-01-26 PROCEDURE — 83880 ASSAY OF NATRIURETIC PEPTIDE: CPT | Performed by: EMERGENCY MEDICINE

## 2022-01-26 PROCEDURE — 85730 THROMBOPLASTIN TIME PARTIAL: CPT | Performed by: EMERGENCY MEDICINE

## 2022-01-26 PROCEDURE — 93010 ELECTROCARDIOGRAM REPORT: CPT | Mod: ,,, | Performed by: SPECIALIST

## 2022-01-26 PROCEDURE — 93010 EKG 12-LEAD: ICD-10-PCS | Mod: 76,,, | Performed by: SPECIALIST

## 2022-01-26 PROCEDURE — 25000003 PHARM REV CODE 250: Performed by: FAMILY MEDICINE

## 2022-01-26 PROCEDURE — G0378 HOSPITAL OBSERVATION PER HR: HCPCS | Mod: CS

## 2022-01-26 PROCEDURE — 85610 PROTHROMBIN TIME: CPT | Performed by: EMERGENCY MEDICINE

## 2022-01-26 PROCEDURE — 99291 CRITICAL CARE FIRST HOUR: CPT

## 2022-01-26 PROCEDURE — 84484 ASSAY OF TROPONIN QUANT: CPT | Mod: 91 | Performed by: FAMILY MEDICINE

## 2022-01-26 PROCEDURE — 36415 COLL VENOUS BLD VENIPUNCTURE: CPT | Performed by: FAMILY MEDICINE

## 2022-01-26 PROCEDURE — 99204 OFFICE O/P NEW MOD 45 MIN: CPT | Mod: ,,, | Performed by: INTERNAL MEDICINE

## 2022-01-26 PROCEDURE — 25000003 PHARM REV CODE 250: Performed by: EMERGENCY MEDICINE

## 2022-01-26 PROCEDURE — 99204 PR OFFICE/OUTPT VISIT, NEW, LEVL IV, 45-59 MIN: ICD-10-PCS | Mod: ,,, | Performed by: INTERNAL MEDICINE

## 2022-01-26 PROCEDURE — G0378 HOSPITAL OBSERVATION PER HR: HCPCS

## 2022-01-26 PROCEDURE — 36415 COLL VENOUS BLD VENIPUNCTURE: CPT | Performed by: EMERGENCY MEDICINE

## 2022-01-26 PROCEDURE — 84484 ASSAY OF TROPONIN QUANT: CPT | Performed by: EMERGENCY MEDICINE

## 2022-01-26 PROCEDURE — 96374 THER/PROPH/DIAG INJ IV PUSH: CPT

## 2022-01-26 PROCEDURE — 83735 ASSAY OF MAGNESIUM: CPT | Performed by: EMERGENCY MEDICINE

## 2022-01-26 PROCEDURE — 83036 HEMOGLOBIN GLYCOSYLATED A1C: CPT | Performed by: FAMILY MEDICINE

## 2022-01-26 RX ORDER — TALC
6 POWDER (GRAM) TOPICAL NIGHTLY PRN
Status: DISCONTINUED | OUTPATIENT
Start: 2022-01-26 | End: 2022-01-29 | Stop reason: HOSPADM

## 2022-01-26 RX ORDER — ASPIRIN 325 MG
325 TABLET ORAL
Status: COMPLETED | OUTPATIENT
Start: 2022-01-26 | End: 2022-01-26

## 2022-01-26 RX ORDER — SIMETHICONE 80 MG
1 TABLET,CHEWABLE ORAL 4 TIMES DAILY PRN
Status: DISCONTINUED | OUTPATIENT
Start: 2022-01-26 | End: 2022-01-29 | Stop reason: HOSPADM

## 2022-01-26 RX ORDER — CALCIUM ACETATE 667 MG/1
667 CAPSULE ORAL DAILY
Status: DISCONTINUED | OUTPATIENT
Start: 2022-01-26 | End: 2022-01-29 | Stop reason: HOSPADM

## 2022-01-26 RX ORDER — DORZOLAMIDE HYDROCHLORIDE AND TIMOLOL MALEATE 20; 5 MG/ML; MG/ML
1 SOLUTION/ DROPS OPHTHALMIC 2 TIMES DAILY
Status: DISCONTINUED | OUTPATIENT
Start: 2022-01-26 | End: 2022-01-29 | Stop reason: HOSPADM

## 2022-01-26 RX ORDER — POTASSIUM CHLORIDE 20 MEQ/1
40 TABLET, EXTENDED RELEASE ORAL ONCE
Status: COMPLETED | OUTPATIENT
Start: 2022-01-26 | End: 2022-01-26

## 2022-01-26 RX ORDER — SODIUM CHLORIDE 0.9 % (FLUSH) 0.9 %
10 SYRINGE (ML) INJECTION
Status: DISCONTINUED | OUTPATIENT
Start: 2022-01-26 | End: 2022-01-29 | Stop reason: HOSPADM

## 2022-01-26 RX ORDER — POLYETHYLENE GLYCOL 3350 17 G/17G
17 POWDER, FOR SOLUTION ORAL 2 TIMES DAILY PRN
Status: DISCONTINUED | OUTPATIENT
Start: 2022-01-26 | End: 2022-01-29 | Stop reason: HOSPADM

## 2022-01-26 RX ORDER — NALOXONE HCL 0.4 MG/ML
0.02 VIAL (ML) INJECTION
Status: DISCONTINUED | OUTPATIENT
Start: 2022-01-26 | End: 2022-01-29 | Stop reason: HOSPADM

## 2022-01-26 RX ORDER — AMOXICILLIN 250 MG
1 CAPSULE ORAL 2 TIMES DAILY PRN
Status: DISCONTINUED | OUTPATIENT
Start: 2022-01-26 | End: 2022-01-26

## 2022-01-26 RX ORDER — ATORVASTATIN CALCIUM 20 MG/1
40 TABLET, FILM COATED ORAL DAILY
Status: DISCONTINUED | OUTPATIENT
Start: 2022-01-26 | End: 2022-01-27

## 2022-01-26 RX ORDER — WARFARIN 1 MG/1
1 TABLET ORAL DAILY
Status: DISCONTINUED | OUTPATIENT
Start: 2022-01-26 | End: 2022-01-29 | Stop reason: HOSPADM

## 2022-01-26 RX ORDER — WARFARIN 1 MG/1
1 TABLET ORAL DAILY
Status: ON HOLD | COMMUNITY
End: 2022-01-28 | Stop reason: SDUPTHER

## 2022-01-26 RX ORDER — METOPROLOL TARTRATE 25 MG/1
25 TABLET, FILM COATED ORAL 2 TIMES DAILY
Status: DISCONTINUED | OUTPATIENT
Start: 2022-01-26 | End: 2022-01-29 | Stop reason: HOSPADM

## 2022-01-26 RX ORDER — LATANOPROST 50 UG/ML
1 SOLUTION/ DROPS OPHTHALMIC NIGHTLY
Status: DISCONTINUED | OUTPATIENT
Start: 2022-01-26 | End: 2022-01-29 | Stop reason: HOSPADM

## 2022-01-26 RX ORDER — IBUPROFEN 200 MG
16 TABLET ORAL
Status: DISCONTINUED | OUTPATIENT
Start: 2022-01-26 | End: 2022-01-29 | Stop reason: HOSPADM

## 2022-01-26 RX ORDER — DOCUSATE SODIUM 100 MG/1
100 CAPSULE, LIQUID FILLED ORAL DAILY
Status: DISCONTINUED | OUTPATIENT
Start: 2022-01-26 | End: 2022-01-29 | Stop reason: HOSPADM

## 2022-01-26 RX ORDER — HYDROCODONE BITARTRATE AND ACETAMINOPHEN 5; 325 MG/1; MG/1
1 TABLET ORAL EVERY 4 HOURS PRN
Status: DISCONTINUED | OUTPATIENT
Start: 2022-01-26 | End: 2022-01-29 | Stop reason: HOSPADM

## 2022-01-26 RX ORDER — IPRATROPIUM BROMIDE AND ALBUTEROL SULFATE 2.5; .5 MG/3ML; MG/3ML
3 SOLUTION RESPIRATORY (INHALATION) EVERY 4 HOURS PRN
Status: DISCONTINUED | OUTPATIENT
Start: 2022-01-26 | End: 2022-01-29 | Stop reason: HOSPADM

## 2022-01-26 RX ORDER — DILTIAZEM HYDROCHLORIDE 5 MG/ML
0.25 INJECTION INTRAVENOUS
Status: COMPLETED | OUTPATIENT
Start: 2022-01-26 | End: 2022-01-26

## 2022-01-26 RX ORDER — MORPHINE SULFATE 4 MG/ML
4 INJECTION, SOLUTION INTRAMUSCULAR; INTRAVENOUS EVERY 4 HOURS PRN
Status: DISCONTINUED | OUTPATIENT
Start: 2022-01-26 | End: 2022-01-27

## 2022-01-26 RX ORDER — ACETAMINOPHEN 325 MG/1
650 TABLET ORAL EVERY 8 HOURS PRN
Status: DISCONTINUED | OUTPATIENT
Start: 2022-01-26 | End: 2022-01-29 | Stop reason: HOSPADM

## 2022-01-26 RX ORDER — ONDANSETRON 2 MG/ML
4 INJECTION INTRAMUSCULAR; INTRAVENOUS EVERY 6 HOURS PRN
Status: DISCONTINUED | OUTPATIENT
Start: 2022-01-26 | End: 2022-01-29 | Stop reason: HOSPADM

## 2022-01-26 RX ORDER — IBUPROFEN 200 MG
24 TABLET ORAL
Status: DISCONTINUED | OUTPATIENT
Start: 2022-01-26 | End: 2022-01-29 | Stop reason: HOSPADM

## 2022-01-26 RX ORDER — GLUCAGON 1 MG
1 KIT INJECTION
Status: DISCONTINUED | OUTPATIENT
Start: 2022-01-26 | End: 2022-01-29 | Stop reason: HOSPADM

## 2022-01-26 RX ORDER — ASPIRIN 81 MG/1
81 TABLET ORAL DAILY
Status: DISCONTINUED | OUTPATIENT
Start: 2022-01-27 | End: 2022-01-29 | Stop reason: HOSPADM

## 2022-01-26 RX ADMIN — POTASSIUM CHLORIDE 40 MEQ: 20 TABLET, EXTENDED RELEASE ORAL at 12:01

## 2022-01-26 RX ADMIN — LATANOPROST 1 DROP: 50 SOLUTION OPHTHALMIC at 08:01

## 2022-01-26 RX ADMIN — DORZOLAMIDE HYDROCHLORIDE AND TIMOLOL MALEATE 1 DROP: 22.3; 6.8 SOLUTION/ DROPS OPHTHALMIC at 08:01

## 2022-01-26 RX ADMIN — DILTIAZEM HYDROCHLORIDE 14 MG: 5 INJECTION INTRAVENOUS at 11:01

## 2022-01-26 RX ADMIN — CALCIUM ACETATE 667 MG: 667 CAPSULE ORAL at 07:01

## 2022-01-26 RX ADMIN — ASPIRIN 325 MG ORAL TABLET 325 MG: 325 PILL ORAL at 11:01

## 2022-01-26 RX ADMIN — DILTIAZEM HYDROCHLORIDE 5 MG/HR: 100 INJECTION, POWDER, LYOPHILIZED, FOR SOLUTION INTRAVENOUS at 11:01

## 2022-01-26 RX ADMIN — WARFARIN SODIUM 1 MG: 1 TABLET ORAL at 07:01

## 2022-01-26 RX ADMIN — METOPROLOL TARTRATE 25 MG: 25 TABLET, FILM COATED ORAL at 08:01

## 2022-01-26 NOTE — ED PROVIDER NOTES
Encounter Date: 1/26/2022       History     Chief Complaint   Patient presents with    Chest Pain     Patient reports CP after finishing dialysis today      81-year-old female end-stage renal disease on hemodialysis Monday Wednesday Friday ([facility]) , history of anemia associated with CKD, hyperlipidemia, hypertension, AFib on Coumadin presents to the ER for evaluation of AFib with RVR while at hemodialysis today.  She reports that she was just completing her dialysis around 915 when she developed chest pain that is 8/10 midsternal pressure and tightness with associated shortness of breath though she reports both chest pain and shortness of breath or improved now.  She had been placed on 2 L oxygen with EMS oxygen was off on arrival she was 90% and comfortable.  She also reports nausea which is now resolved.  No diaphoresis lightheadedness or dizziness currently.  She did feel lightheaded earlier.  She reports that she is having palpitations with elevated heart rate and feels that she is skipping beats.  But is less severe than at 9:15 a.m. was symptoms began.  She does not know her home medications she has asked us to call her son Raffi         Review of patient's allergies indicates:   Allergen Reactions    Cyclobenzaprine     Fish containing products Hives    Peanut     Tramadol Itching     Past Medical History:   Diagnosis Date    A-fib     Anxiety     Depression     Disorder of kidney and ureter     Encephalopathy acute 1/1/2018    End stage kidney disease 6/17/2017    Gout     Hyperlipidemia     Hypertension     Moderate episode of recurrent major depressive disorder 1/17/2018    Nephropathy hypertensive, stage 5 chronic kidney disease or end stage renal disease 6/17/2017    Obstructive pattern present on pulmonary function testing 7/28/2021    Shows moderate obstruction.    Osteopenia of multiple sites 3/9/2018    Based upon bone density measurements. Patient also has chronic kidney  disease.    Stroke 11/2016     Past Surgical History:   Procedure Laterality Date    CARDIAC SURGERY      stents    WRIST SURGERY       Family History   Problem Relation Age of Onset    Heart disease Mother     Cancer Father      Social History     Tobacco Use    Smoking status: Never Smoker    Smokeless tobacco: Never Used   Substance Use Topics    Alcohol use: No    Drug use: No     Review of Systems   Constitutional: Negative for activity change, appetite change, chills, diaphoresis, fatigue and fever.   HENT: Negative for congestion, postnasal drip, rhinorrhea and sore throat.    Respiratory: Positive for chest tightness. Negative for cough, shortness of breath, wheezing and stridor.    Cardiovascular: Positive for chest pain and palpitations. Negative for leg swelling.   Gastrointestinal: Positive for nausea. Negative for abdominal distention, abdominal pain, blood in stool, constipation, diarrhea and vomiting.   Genitourinary: Negative for dysuria, flank pain, frequency, hematuria and urgency.   Musculoskeletal: Negative for arthralgias, back pain, myalgias, neck pain and neck stiffness.   Skin: Negative for rash.   Neurological: Positive for light-headedness. Negative for dizziness, syncope, weakness and headaches.   Hematological: Does not bruise/bleed easily.   Psychiatric/Behavioral: Negative for confusion. The patient is not nervous/anxious.    All other systems reviewed and are negative.      Physical Exam     Initial Vitals [01/26/22 1052]   BP Pulse Resp Temp SpO2   131/82 (!) 195 20 98.3 °F (36.8 °C) 98 %      MAP       --         Physical Exam    Nursing note and vitals reviewed.  Constitutional: She appears well-developed and well-nourished. She is not diaphoretic. No distress.   HENT:   Head: Normocephalic and atraumatic.   Right Ear: External ear normal.   Left Ear: External ear normal.   Nose: Nose normal.   Mouth/Throat: Oropharynx is clear and moist.   Eyes: Conjunctivae and EOM are  normal. Pupils are equal, round, and reactive to light.   Neck: Neck supple. No tracheal deviation present.   Normal range of motion.  Cardiovascular: Normal heart sounds and intact distal pulses. Exam reveals no gallop and no friction rub.    No murmur heard.  Blood pressure 131/82 AFib with RVR heart rate 120s to 160s   Pulmonary/Chest: Breath sounds normal. No stridor. No respiratory distress. She has no wheezes. She has no rhonchi. She has no rales. She exhibits no tenderness.   Sats 98% on room air respirations 20 clear breath sounds bilaterally   Abdominal: Abdomen is soft. Bowel sounds are normal. She exhibits no distension and no mass. There is no abdominal tenderness. There is no rebound and no guarding.   Musculoskeletal:         General: No tenderness or edema. Normal range of motion.      Cervical back: Normal range of motion and neck supple.     Neurological: She is alert and oriented to person, place, and time. She has normal strength. No cranial nerve deficit or sensory deficit.   Skin: Skin is warm and dry. No rash noted. No erythema. No pallor.   Psychiatric: She has a normal mood and affect. Her behavior is normal. Judgment and thought content normal.         ED Course   Procedures  Labs Reviewed   CBC W/ AUTO DIFFERENTIAL - Abnormal; Notable for the following components:       Result Value    RBC 3.85 (*)     Hemoglobin 11.7 (*)     MCHC 31.3 (*)     Eos # 0.7 (*)     Eosinophil % 10.5 (*)     All other components within normal limits   COMPREHENSIVE METABOLIC PANEL - Abnormal; Notable for the following components:    Potassium 3.1 (*)     Creatinine 4.4 (*)     Calcium 8.4 (*)     eGFR if  10.2 (*)     eGFR if non  8.8 (*)     All other components within normal limits   B-TYPE NATRIURETIC PEPTIDE - Abnormal; Notable for the following components:     (*)     All other components within normal limits   PROTIME-INR - Abnormal; Notable for the following  components:    PT 24.9 (*)     All other components within normal limits   TROPONIN I   SARS-COV-2 RNA AMPLIFICATION, QUAL   MAGNESIUM   APTT   APTT     EKG Readings: (Independently Interpreted)   Initial Reading: No STEMI. Rhythm: Atrial Fibrillation. Heart Rate: 147. Ectopy: PVCs. Conduction: Normal. ST Segment Depression: V2, V3, V5, V4 and V6.        ECG Results          EKG 12-lead (In process)  Result time 01/26/22 11:58:44    In process by Interface, Lab In [facility] (01/26/22 11:58:44)                 Narrative:    Test Reason : R07.9,    Vent. Rate : 072 BPM     Atrial Rate : 072 BPM     P-R Int : 116 ms          QRS Dur : 074 ms      QT Int : 446 ms       P-R-T Axes : 000 029 -49 degrees     QTc Int : 488 ms    Normal sinus rhythm  ST and T wave abnormality, consider inferior ischemia  ST and T wave abnormality, consider anterolateral ischemia  Prolonged QT  Abnormal ECG  When compared with ECG of 26-JAN-2022 10:49,  Significant changes have occurred    Referred By: [email or web address]   SELF           Confirmed By:                              EKG 12-lead (In process)  Result time 01/26/22 10:56:51    In process by Interface, Lab In [facility] (01/26/22 10:56:51)                 Narrative:    Test Reason : R07.9,    Vent. Rate : 142 BPM     Atrial Rate : 147 BPM     P-R Int : 000 ms          QRS Dur : 080 ms      QT Int : 330 ms       P-R-T Axes : 000 -10 188 degrees     QTc Int : 507 ms    Atrial fibrillation with rapid ventricular response with premature  ventricular or aberrantly conducted complexes  Marked ST abnormality, possible inferior subendocardial injury  Abnormal ECG  When compared with ECG of 25-JUN-2021 18:49,  Significant changes have occurred    Referred By: [email or web address]   SELF           Confirmed By:                             Imaging Results          X-Ray Chest AP Portable (Final result)  Result time 01/26/22 11:29:18    Final result by Martin Woody MD (01/26/22 11:29:18)                  Narrative:    Chest single view    CLINICAL DATA: Chest pain    FINDINGS: Comparison to June 25. Heart size is normal. The mediastinum is unremarkable. Pulmonary vasculature and interstitial markings are mildly prominent. No alveolar infiltrates or pleural effusions are identified. Osseous structures are within normal limits.    IMPRESSION:  1. Mild diffuse interstitial prominence suggesting borderline interstitial edema.  2. No other significant findings.    Electronically signed by:  Martin Woody MD  1/26/2022 11:29 AM CST Workstation: [id number]                            X-Rays:   Independently Interpreted Readings:   Chest X-Ray: Normal heart size.  No infiltrates.  No acute abnormalities.     Medications   diltiaZEM 100 mg in dextrose 5% 100 mL IVPB (ready to mix system) (titrating) (0 mg/hr Intravenous Stopped 1/26/22 1128)   diltiaZEM injection 14 mg (14 mg Intravenous Given 1/26/22 1115)   aspirin tablet 325 mg (325 mg Oral Given 1/26/22 1113)   potassium chloride SA CR tablet 40 mEq (40 mEq Oral Given 1/26/22 1206)     Medical Decision Making:   Independently Interpreted Test(s):   I have ordered and independently interpreted X-rays - see prior notes.  I have ordered and independently interpreted EKG Reading(s) - see prior notes  Clinical Tests:   Lab Tests: Ordered and Reviewed       <> Summary of Lab: Mag 1.9  COVID negative  INR 2.4  Troponin negative    Potassium 3.1 creatinine 4.4 calcium 8.4 GFR 10.2  Hemoglobin 11.7  Radiological Study: Ordered and Reviewed  Medical Tests: Ordered and Reviewed  ED Management:  81-year-old female end-stage renal disease on hemodialysis Monday Wednesday Friday ([facility]) , history of anemia associated with CKD, hyperlipidemia, hypertension, AFib on Coumadin presents to the ER for evaluation of AFib with RVR while at hemodialysis today.  She reports that she was just completing her dialysis around 915 when she developed chest pain that is 8/10 midsternal  pressure and tightness with associated shortness of breath though she reports both chest pain and shortness of breath or improved now.  She had been placed on 2 L oxygen with EMS oxygen was off on arrival she was 90% and comfortable.  She also reports nausea which is now resolved.  No diaphoresis lightheadedness or dizziness currently.  She did feel lightheaded earlier.  She reports that she is having palpitations with elevated heart rate and feels that she is skipping beats.  But is less severe than at 9:15 a.m. was symptoms began.  She does not know her home medications she has asked us to call her son Raffi   On physical exam patient is in AFib with RVR heart rate between 120 and 140 with ST depressions in V2 through V6.  Possibly rate dependent but with patient's ongoing chest pain shortness of breath will treat her with aspirin 325 mg orally.  She has been told to chew the aspirin.  Will also treat with Cardizem 14 mg IV bolus and started a drip.  If chest pain continues will discuss using nitro were morphine.  She has no cardiac history.  Her blood pressure is stable right now at 131/82 but with EMS patient was hypotensive at 92/55.  She is oxygenating well awake alert and oriented.  Sats are currently 98% on room air she was placed on 2 L of oxygen for comfort.  No peripheral edema.  Patient did not take beta-blockers or calcium channel blockers will use Cardizem to control her rate.  Jacquie Wang M.D.  11:14 AM 1/26/2022    Other:   I have discussed this case with another health care provider.            Attending Attestation:         Attending Critical Care:   Critical Care Times:   Direct Patient Care (initial evaluation, reassessments, and time considering the case)................................................................10 minutes.   Additional History from reviewing old medical records or taking additional history from the family, EMS, PCP, etc.......................5 minutes.   Ordering,  Reviewing, and Interpreting Diagnostic Studies...............................................................................................................5 minutes.   Documentation..................................................................................................................................................................................10 minutes.   Consultation with other Physicians. .................................................................................................................................................5 minutes.   Consultation with the patient's family directly relating to the patient's condition, care, and DNR status (when patient unable)......5 minutes.   ==============================================================  · Total Critical Care Time - exclusive of procedural time: 40 minutes.  ==============================================================  Critical care was necessary to treat or prevent imminent or life-threatening deterioration of the following conditions: cardiac arrhythmia.   Critical care was time spent personally by me on the following activities: obtaining history from patient or relative, examination of patient, ordering lab, x-rays, and/or EKG, review of old charts, development of treatment plan with patient or relative, ordering and performing treatments and interventions, discussion with consultants, interpretation of cardiac measurements, evaluation of patient's response to treatment and re-evaluation of patient's conition.   Critical Care Condition: life-threatening           ED Course as of 01/26/22 1207   Wed Jan 26, 2022   1143 After Cardizem 14 mg - HR rate controlled at 68.  No cardizem gtt started.  Jacquie Wang M.D.  11:44 AM 1/26/2022   [RM]   1147 She reports chest pain is completely resolved after the aspirin and Cardizem here.  Repeat EKG shows pain is now in sinus rhythm with a rate of 72 prolonged QT.  She does have  flattening of T-waves throughout and inverted T-wave in V3 the 4 V5 V6.  Jacquie Wang M.D.  11:47 AM 1/26/2022   [RM]      ED Course User Index  [RM] Jacquie Wang MD          patient is currently chest pain free heart rate in the 70s.  Rate controlled after Cardizem bolus.  EKG shows still showed ischemic changes and patient reported that she was having concerning sounding chest pain when she was in AFib with RVR she will be admitted to Hospital Medicine for rule out ACS.  Her potassium has been replaced with 40 mEq of potassium chloride.  Jacquie Wang M.D.  12:07 PM 1/26/2022        Clinical Impression:   Final diagnoses:  [R07.9] Chest pain  [R06.02] SOB (shortness of breath)  [E87.6] Hypokalemia  [I48.91] Atrial fibrillation with rapid ventricular response (Primary)          ED Disposition Condition    Admit               Jacquie Wang MD  01/26/22 1203

## 2022-01-26 NOTE — H&P
[facility]   History & Physical   Patient Name: Tyra Isaac  MRN: [id number]  Admission Date: 1/26/2022 10:40 AM  Attending Physician: Annabella Wetzel MD  Primary Care Provider: Kalpesh Goel MD  Face-to-Face encounter date: 01/26/2022    Patient information was obtained from patient, past medical records, ER physician, and ER records.     HISTORY OF PRESENT ILLNESS:     Tyra Isaac is a 81 y.o. Black or  female   With PMH of pAF on coumadin, HTN, ESRD on HD   who presents with chest pain.    Onset today after her HD session this morning  Located substernal, described as tightness and pressure  Constant and severe  +SOB, +nausea, +palpitations, +dizziness  No fever or chills  Per EMS she was hypotensive before arrival  Found to be a-fib RVR in the ER  She was given diltiazem bolus + asa   She converted to nsr and all symptoms resolved  She reports that currently she is only hungry  She is not currently taking rate control or antiarrhythmics medication    REVIEW OF SYSTEMS:     All systems reviewed and are negative except as noted per above.    PAST MEDICAL HISTORY:     Past Medical History:   Diagnosis Date    A-fib     Anxiety     Depression     Disorder of kidney and ureter     Encephalopathy acute 1/1/2018    End stage kidney disease 6/17/2017    Gout     Hyperlipidemia     Hypertension     Moderate episode of recurrent major depressive disorder 1/17/2018    Nephropathy hypertensive, stage 5 chronic kidney disease or end stage renal disease 6/17/2017    Obstructive pattern present on pulmonary function testing 7/28/2021    Shows moderate obstruction.    Osteopenia of multiple sites 3/9/2018    Based upon bone density measurements. Patient also has chronic kidney disease.    Stroke 11/2016       PAST SURGICAL HISTORY:     Past Surgical History:   Procedure Laterality Date    CARDIAC SURGERY      stents    WRIST SURGERY    "      ALLERGIES:   Cyclobenzaprine, Fish containing products, Peanut, and Tramadol    FAMILY HISTORY:     Family History   Problem Relation Age of Onset    Heart disease Mother     Cancer Father        SOCIAL HISTORY:     Social History     Tobacco Use    Smoking status: Never Smoker    Smokeless tobacco: Never Used   Substance Use Topics    Alcohol use: No        Social History     Substance and Sexual Activity   Sexual Activity Not Currently        HOME MEDICATIONS:     Prior to Admission medications    Medication Sig Start Date End Date Taking? Authorizing Provider   amLODIPine (NORVASC) 5 MG tablet Take 5 mg by mouth once daily. 8/3/20  Yes Historical Provider   atorvastatin (LIPITOR) 40 MG tablet Take 40 mg by mouth once daily.   Yes Historical Provider   b complex vitamins tablet Take 1 tablet by mouth once daily.   Yes Historical Provider   calcium acetate (PHOSLO) 667 mg capsule Take 667 mg by mouth Daily. 7/22/19  Yes Historical Provider   docusate sodium (COLACE) 100 MG capsule Take 100 mg by mouth once daily.    Yes Historical Provider   dorzolamide-timolol 2-0.5% (COSOPT) 22.3-6.8 mg/mL ophthalmic solution Place 1 drop into both eyes 2 (two) times daily.  2/6/18  Yes Historical Provider   latanoprost 0.005 % ophthalmic solution Place 1 drop into both eyes every evening.  1/15/18  Yes Historical Provider   lidocaine 5 % Crea Apply 1 application topically every Mon, Wed, Fri.    Yes Historical Provider   warfarin (COUMADIN) 1 MG tablet Take 1 mg by mouth Daily.   Yes Historical Provider   warfarin (COUMADIN) 3 MG tablet Take 1.5 mg by mouth Daily. Pt takes 1/2 of the 3mg pill to equal dose of 1.5mg, same dose everyday  1/26/22  Historical Provider       PHYSICAL EXAM:     /60   Pulse 69   Temp 98.3 °F (36.8 °C) (Oral)   Resp (!) 24   Ht 5' 5" (1.651 m)   Wt 55.8 kg (123 lb)   SpO2 100%   BMI 20.47 kg/m²     Gen: alert, responsive  HEENT:  Eyes - no pallor  External ears with no " lesions  Nares patent  Mouth - lips chapped  CV: RRR  Lungs: CTA B/L  Abd: +BS, soft, NT, ND  Ext: no atrophy or edema  Skin: warm, dry  Neuro: alert, responsive, follows commands  Psych: pleasant, calm    LABS AND IMAGING:     Labs Reviewed   CBC W/ AUTO DIFFERENTIAL - Abnormal; Notable for the following components:       Result Value    RBC 3.85 (*)     Hemoglobin 11.7 (*)     MCHC 31.3 (*)     Eos # 0.7 (*)     Eosinophil % 10.5 (*)     All other components within normal limits   COMPREHENSIVE METABOLIC PANEL - Abnormal; Notable for the following components:    Potassium 3.1 (*)     Creatinine 4.4 (*)     Calcium 8.4 (*)     eGFR if  10.2 (*)     eGFR if non  8.8 (*)     All other components within normal limits   B-TYPE NATRIURETIC PEPTIDE - Abnormal; Notable for the following components:     (*)     All other components within normal limits   PROTIME-INR - Abnormal; Notable for the following components:    PT 24.9 (*)     All other components within normal limits   TROPONIN I   SARS-COV-2 RNA AMPLIFICATION, QUAL   MAGNESIUM   APTT   APTT   HEMOGLOBIN A1C     Imaging Results          X-Ray Chest AP Portable (Final result)  Result time 01/26/22 11:29:18    Final result by Martin Woody MD (01/26/22 11:29:18)                 Narrative:    Chest single view    CLINICAL DATA: Chest pain    FINDINGS: Comparison to June 25. Heart size is normal. The mediastinum is unremarkable. Pulmonary vasculature and interstitial markings are mildly prominent. No alveolar infiltrates or pleural effusions are identified. Osseous structures are within normal limits.    IMPRESSION:  1. Mild diffuse interstitial prominence suggesting borderline interstitial edema.  2. No other significant findings.    Electronically signed by:  Martin Woody MD  1/26/2022 11:29 AM CST Workstation: [id number]                                ASSESSMENT & PLAN:   Tyra Isaac is a 81 y.o. female  admitted for    Active Hospital Problems    Diagnosis  POA    *Atrial fibrillation with rapid ventricular response [I48.91]  Yes    Chest pain [R07.9]  Yes    ESRD (end stage renal disease) [N18.6]  Yes    Hypokalemia [E87.6]  Yes    Coronary artery disease of native heart with stable angina pectoris [I25.118]  Yes      Resolved Hospital Problems   No resolved problems to display.        Plan    A-fib RVR  Anticoagulated on coumadin  Hypokalemia  - converted to nsr with diltiazem bolus  - start metoprolol  - potassium replacement  - trending troponin  - telemetry  - echo  - daily INR  - Trending BMP; Mg; replacement prn  - cardiology consulted, thank you    ESRD on HD  - nephrology consulted, thank you    Chronic conditions as noted above/below; home medications reviewed personally by me and restarted as appropriate  DVT ppx: home coumadin    Annabella Wetzel MD  [facility] Hospitalist  01/26/2022

## 2022-01-26 NOTE — CONSULTS
INPATIENT NEPHROLOGY CONSULT   [facility]    Patient Name: Tyra Isaac  Date: 01/26/2022    Reason for consultation: ESRD    Chief Complaint:   Chief Complaint   Patient presents with    Chest Pain     Patient reports CP after finishing dialysis today        History of Present Illness:  82 y/o F with a history of pAF, HTN and ESRD on HD MWF who p/f HD unit after completing HD with chest pain, + dyspnea/nausea/palpitations/diaphoresis, found to be in AF with RVR with hypotension, getting medical management, consulted for dialysis.    Interval History:  1/26- feeling better, lots of social stressors, in NSR on exam    Plan of Care:    Assessment:  ESRD on HD MWF  HTN  pAF- AF with RVR; a/w chest pain- getting ACS r/o  HypoK/HypoMg  SHPT  Anemia of CKD    Plan:    - Will continue HD MWF- no acute needs since completed HD today.  - Will adjust UF goal to dry weight.  - Getting medical management for arrhythmia and plan for ACS r/o with echo, nuclear stress test. BNP improved from prior, not much trop leak.  - Recheck K, Mg in AM and replete accordingly.  - Check phos.  - No acute BRAYAN needs.     Thank you for allowing us to participate in this patient's care. We will continue to follow.    Vital Signs:  Temp Readings from Last 3 Encounters:   01/26/22 98.3 °F (36.8 °C) (Oral)   10/11/21 98.6 °F (37 °C)   06/26/21 98.6 °F (37 °C)       Pulse Readings from Last 3 Encounters:   01/26/22 69   10/11/21 87   06/26/21 (!) 54       BP Readings from Last 3 Encounters:   01/26/22 126/62   10/11/21 134/70   06/26/21 109/61       Weight:  Wt Readings from Last 3 Encounters:   01/26/22 55.8 kg (123 lb)   10/11/21 55.8 kg (123 lb)   06/25/21 57 kg (125 lb 10.6 oz)       Past Medical & Surgical History:  Past Medical History:   Diagnosis Date    A-fib     Anxiety     Depression     Disorder of kidney and ureter     Encephalopathy acute 1/1/2018    End stage kidney disease 6/17/2017    Gout      Hyperlipidemia     Hypertension     Moderate episode of recurrent major depressive disorder 1/17/2018    Nephropathy hypertensive, stage 5 chronic kidney disease or end stage renal disease 6/17/2017    Obstructive pattern present on pulmonary function testing 7/28/2021    Shows moderate obstruction.    Osteopenia of multiple sites 3/9/2018    Based upon bone density measurements. Patient also has chronic kidney disease.    Stroke 11/2016       Past Surgical History:   Procedure Laterality Date    CARDIAC SURGERY      stents    WRIST SURGERY         Past Social History:  Social History     Socioeconomic History    Marital status:      Spouse name: Aria Isaac    Number of children: 3   Occupational History    Occupation: Not working   Tobacco Use    Smoking status: Never Smoker    Smokeless tobacco: Never Used   Substance and Sexual Activity    Alcohol use: No    Drug use: No    Sexual activity: Not Currently     Social Determinants of Health     Financial Resource Strain: Medium Risk    Difficulty of Paying Living Expenses: Somewhat hard   Food Insecurity: No Food Insecurity    Worried About Running Out of Food in the Last Year: Never true    Ran Out of Food in the Last Year: Never true   Transportation Needs: No Transportation Needs    Lack of Transportation (Medical): No    Lack of Transportation (Non-Medical): No   Physical Activity: Inactive    Days of Exercise per Week: 0 days    Minutes of Exercise per Session: 0 min   Stress: Stress Concern Present    Feeling of Stress : Rather much   Social Connections: Socially Isolated    Frequency of Communication with Friends and Family: Twice a week    Frequency of Social Gatherings with Friends and Family: Twice a week    Attends Christian Services: Never    Active Member of Clubs or Organizations: No    Attends Club or Organization Meetings: Never    Marital Status:    Housing Stability: Low Risk     Unable to Pay  for Housing in the Last Year: No    Number of Places Lived in the Last Year: 1    Unstable Housing in the Last Year: No       Medications:  Scheduled Meds:   [START ON 1/27/2022] aspirin  81 mg Oral Daily    atorvastatin  40 mg Oral Daily    calcium acetate(phosphat bind)  667 mg Oral Daily    docusate sodium  100 mg Oral Daily    dorzolamide-timolol 2-0.5%  1 drop Both Eyes BID    latanoprost  1 drop Both Eyes QHS    metoprolol tartrate  25 mg Oral BID    warfarin  1 mg Oral Daily     Continuous Infusions:  PRN Meds:.acetaminophen, albuterol-ipratropium, dextrose 50%, dextrose 50%, glucagon (human recombinant), glucose, glucose, HYDROcodone-acetaminophen, melatonin, morphine, naloxone, ondansetron, polyethylene glycol, simethicone, sodium chloride 0.9%  No current facility-administered medications on file prior to encounter.     Current Outpatient Medications on File Prior to Encounter   Medication Sig Dispense Refill    amLODIPine (NORVASC) 5 MG tablet Take 5 mg by mouth once daily.      atorvastatin (LIPITOR) 40 MG tablet Take 40 mg by mouth once daily.      b complex vitamins tablet Take 1 tablet by mouth once daily.      calcium acetate (PHOSLO) 667 mg capsule Take 667 mg by mouth Daily.  6    docusate sodium (COLACE) 100 MG capsule Take 100 mg by mouth once daily.       dorzolamide-timolol 2-0.5% (COSOPT) 22.3-6.8 mg/mL ophthalmic solution Place 1 drop into both eyes 2 (two) times daily.       latanoprost 0.005 % ophthalmic solution Place 1 drop into both eyes every evening.       lidocaine 5 % Crea Apply 1 application topically every Mon, Wed, Fri.       warfarin (COUMADIN) 1 MG tablet Take 1 mg by mouth Daily.      [DISCONTINUED] warfarin (COUMADIN) 3 MG tablet Take 1.5 mg by mouth Daily. Pt takes 1/2 of the 3mg pill to equal dose of 1.5mg, same dose everyday         Allergies:  Cyclobenzaprine, Fish containing products, Peanut, and Tramadol    Past Family History:  Reviewed; refer to  Hospitalist Admission Note    Review of Systems:  Review of Systems - All 14 systems reviewed and negative, except as noted in HPI    Physical Exam:  General Appearance:    NAD, AAO x 3, cooperative, appears stated age   Head:    Normocephalic, atraumatic   Eyes:    PER, EOMI, and conjunctiva/sclera clear bilaterally       Mouth:   Moist mucus membranes, no thrush or oral lesions,       normal dentition   Back:     No CVA tenderness   Lungs:     Clear to auscultation bilaterally, no wheezes, crackles,           rales or rhonchi, symmetric air movement, respirations unlabored   Chest wall:    No tenderness or deformity   Heart:    Regular rate and rhythm, S1 and S2 normal, CAROLA, no rub   or gallop   Abdomen:     Soft, non-tender, non-distended, bowel sounds active all four   quadrants, no RT or guarding, no masses, no organomegaly   Extremities:   Warm and well perfused, distal pulses are intact, no             cyanosis or peripheral edema   MSK:   No joint or muscle swelling, tenderness or deformity   Skin:   Skin color, texture, turgor normal, no rashes or lesions   Neurologic/Psychiatric:   CNII-XII intact, normal strength and sensation       throughout, no asterixis; normal affect, memory, judgement     and insight      Results:  Lab Results   Component Value Date     01/26/2022    K 3.1 (L) 01/26/2022    CL 98 01/26/2022    CO2 28 01/26/2022    BUN 19 01/26/2022    CREATININE 4.4 (H) 01/26/2022    CALCIUM 8.4 (L) 01/26/2022    ANIONGAP 13 01/26/2022    ESTGFRAFRICA 10.2 (A) 01/26/2022    EGFRNONAA 8.8 (A) 01/26/2022       Lab Results   Component Value Date    CALCIUM 8.4 (L) 01/26/2022    PHOS 3.2 12/14/2019       Recent Labs   Lab 01/26/22  1056   WBC 6.29   RBC 3.85*   HGB 11.7*   HCT 37.4      MCV 97   MCH 30.4   MCHC 31.3*       I have personally reviewed pertinent radiological imaging and reports.    I have spent > 70 minutes providing care for this patient for the above diagnoses. These  services have included chart/data/imaging review, evaluation, exam, formulation of plan, , note preparation, and discussions with staff involved in this patient's care.    Rose Maynard MD MPH  [facility] [address] [address]  [phone] (p)  [phone] (f)

## 2022-01-26 NOTE — ED NOTES
Bed: 01A Trauma  Expected date:   Expected time:   Means of arrival:   Comments:  EMS AFIB RVR HYPOTENSIVE

## 2022-01-27 ENCOUNTER — CLINICAL SUPPORT (OUTPATIENT)
Dept: CARDIOLOGY | Facility: HOSPITAL | Age: 82
End: 2022-01-27
Attending: FAMILY MEDICINE
Payer: MEDICARE

## 2022-01-27 ENCOUNTER — HOSPITAL ENCOUNTER (OUTPATIENT)
Dept: RADIOLOGY | Facility: HOSPITAL | Age: 82
Discharge: HOME | End: 2022-01-27
Attending: EMERGENCY MEDICINE | Admitting: FAMILY MEDICINE
Payer: MEDICARE

## 2022-01-27 ENCOUNTER — CLINICAL SUPPORT (OUTPATIENT)
Dept: CARDIOLOGY | Facility: HOSPITAL | Age: 82
End: 2022-01-27
Attending: EMERGENCY MEDICINE
Payer: MEDICARE

## 2022-01-27 PROBLEM — R79.89 ELEVATED TROPONIN: Status: ACTIVE | Noted: 2022-01-27

## 2022-01-27 PROBLEM — F41.9 ANXIETY AND DEPRESSION: Chronic | Status: ACTIVE | Noted: 2022-01-27

## 2022-01-27 PROBLEM — F32.A ANXIETY AND DEPRESSION: Chronic | Status: ACTIVE | Noted: 2022-01-27

## 2022-01-27 PROBLEM — E87.6 HYPOKALEMIA: Status: RESOLVED | Noted: 2019-12-13 | Resolved: 2022-01-27

## 2022-01-27 PROBLEM — D63.8 ANEMIA OF CHRONIC DISEASE: Chronic | Status: ACTIVE | Noted: 2022-01-27

## 2022-01-27 PROBLEM — N18.6 ESRD (END STAGE RENAL DISEASE): Chronic | Status: ACTIVE | Noted: 2019-12-13

## 2022-01-27 PROBLEM — Z65.9 OTHER SOCIAL STRESSOR: Chronic | Status: ACTIVE | Noted: 2022-01-27

## 2022-01-27 LAB
ANION GAP SERPL CALC-SCNC: 11 MMOL/L (ref 8–16)
AORTIC ROOT ANNULUS: 2.98 CM
AORTIC VALVE CUSP SEPERATION: 1.51 CM
AV INDEX (PROSTH): 0.84
AV MEAN GRADIENT: 4 MMHG
AV PEAK GRADIENT: 8 MMHG
AV VALVE AREA: 2.73 CM2
AV VELOCITY RATIO: 67.02
BASOPHILS # BLD AUTO: 0.03 K/UL (ref 0–0.2)
BASOPHILS NFR BLD: 0.6 % (ref 0–1.9)
BSA FOR ECHO PROCEDURE: 1.6 M2
BUN SERPL-MCNC: 38 MG/DL (ref 8–23)
CALCIUM SERPL-MCNC: 8.6 MG/DL (ref 8.7–10.5)
CHLORIDE SERPL-SCNC: 101 MMOL/L (ref 95–110)
CO2 SERPL-SCNC: 28 MMOL/L (ref 23–29)
CREAT SERPL-MCNC: 6.6 MG/DL (ref 0.5–1.4)
CV ECHO LV RWT: 0.58 CM
DIFFERENTIAL METHOD: ABNORMAL
DOP CALC AO PEAK VEL: 1.42 M/S
DOP CALC AO VTI: 28.86 CM
DOP CALC LVOT AREA: 3.3 CM2
DOP CALC LVOT DIAMETER: 2.04 CM
DOP CALC LVOT PEAK VEL: 95.17 M/S
DOP CALC LVOT STROKE VOLUME: 78.8 CM3
DOP CALCLVOT PEAK VEL VTI: 24.12 CM
E WAVE DECELERATION TIME: 207.19 MSEC
E/A RATIO: 1.59
E/E' RATIO: 32.36 M/S
ECHO LV POSTERIOR WALL: 1.08 CM (ref 0.6–1.1)
EJECTION FRACTION: 65 %
EOSINOPHIL # BLD AUTO: 0.8 K/UL (ref 0–0.5)
EOSINOPHIL NFR BLD: 14 % (ref 0–8)
ERYTHROCYTE [DISTWIDTH] IN BLOOD BY AUTOMATED COUNT: 13.4 % (ref 11.5–14.5)
EST. GFR  (AFRICAN AMERICAN): 6.2 ML/MIN/1.73 M^2
EST. GFR  (NON AFRICAN AMERICAN): 5.4 ML/MIN/1.73 M^2
ESTIMATED AVG GLUCOSE: 105 MG/DL (ref 68–131)
FRACTIONAL SHORTENING: 47 % (ref 28–44)
GLUCOSE SERPL-MCNC: 116 MG/DL (ref 70–110)
GLUCOSE SERPL-MCNC: 80 MG/DL (ref 70–110)
HBA1C MFR BLD: 5.3 % (ref 4.5–6.2)
HCT VFR BLD AUTO: 33.1 % (ref 37–48.5)
HGB BLD-MCNC: 10.1 G/DL (ref 12–16)
HR MV ECHO: 64 BPM
IMM GRANULOCYTES # BLD AUTO: 0.01 K/UL (ref 0–0.04)
IMM GRANULOCYTES NFR BLD AUTO: 0.2 % (ref 0–0.5)
INR PPP: 2.1
INTERVENTRICULAR SEPTUM: 1.15 CM (ref 0.6–1.1)
LEFT ATRIUM SIZE: 3.15 CM
LEFT INTERNAL DIMENSION IN SYSTOLE: 2 CM (ref 2.1–4)
LEFT VENTRICLE DIASTOLIC VOLUME INDEX: 31.8 ML/M2
LEFT VENTRICLE DIASTOLIC VOLUME: 51.2 ML
LEFT VENTRICLE MASS INDEX: 84 G/M2
LEFT VENTRICLE SYSTOLIC VOLUME INDEX: 11.2 ML/M2
LEFT VENTRICLE SYSTOLIC VOLUME: 18.1 ML
LEFT VENTRICULAR INTERNAL DIMENSION IN DIASTOLE: 3.75 CM (ref 3.5–6)
LEFT VENTRICULAR MASS: 134.65 G
LV LATERAL E/E' RATIO: 29.67 M/S
LV SEPTAL E/E' RATIO: 35.6 M/S
LYMPHOCYTES # BLD AUTO: 1.2 K/UL (ref 1–4.8)
LYMPHOCYTES NFR BLD: 23.2 % (ref 18–48)
MAGNESIUM SERPL-MCNC: 2.1 MG/DL (ref 1.6–2.6)
MCH RBC QN AUTO: 30.1 PG (ref 27–31)
MCHC RBC AUTO-ENTMCNC: 30.5 G/DL (ref 32–36)
MCV RBC AUTO: 99 FL (ref 82–98)
MONOCYTES # BLD AUTO: 0.8 K/UL (ref 0.3–1)
MONOCYTES NFR BLD: 14 % (ref 4–15)
MV MEAN GRADIENT: 6 MMHG
MV PEAK A VEL: 1.12 M/S
MV PEAK E VEL: 1.78 M/S
NEUTROPHILS # BLD AUTO: 2.6 K/UL (ref 1.8–7.7)
NEUTROPHILS NFR BLD: 48 % (ref 38–73)
NRBC BLD-RTO: 0 /100 WBC
PHOSPHATE SERPL-MCNC: 3.6 MG/DL (ref 2.7–4.5)
PISA TR MAX VEL: 2.87 M/S
PLATELET # BLD AUTO: 229 K/UL (ref 150–450)
PMV BLD AUTO: 9.5 FL (ref 9.2–12.9)
POTASSIUM SERPL-SCNC: 4.9 MMOL/L (ref 3.5–5.1)
PROTHROMBIN TIME: 22.2 SEC (ref 11.4–13.7)
PV PEAK VELOCITY: 101.44 CM/S
RA PRESSURE: 3 MMHG
RBC # BLD AUTO: 3.35 M/UL (ref 4–5.4)
RIGHT VENTRICULAR END-DIASTOLIC DIMENSION: 226 CM
SODIUM SERPL-SCNC: 140 MMOL/L (ref 136–145)
TDI LATERAL: 0.06 M/S
TDI SEPTAL: 0.05 M/S
TDI: 0.06 M/S
TR MAX PG: 33 MMHG
TROPONIN I SERPL DL<=0.01 NG/ML-MCNC: 0.47 NG/ML
TROPONIN I SERPL DL<=0.01 NG/ML-MCNC: 0.53 NG/ML
TV REST PULMONARY ARTERY PRESSURE: 36 MMHG
WBC # BLD AUTO: 5.35 K/UL (ref 3.9–12.7)

## 2022-01-27 PROCEDURE — 99214 OFFICE O/P EST MOD 30 MIN: CPT | Mod: ,,, | Performed by: INTERNAL MEDICINE

## 2022-01-27 PROCEDURE — A9502 TC99M TETROFOSMIN: HCPCS

## 2022-01-27 PROCEDURE — 85610 PROTHROMBIN TIME: CPT | Performed by: FAMILY MEDICINE

## 2022-01-27 PROCEDURE — 97116 GAIT TRAINING THERAPY: CPT

## 2022-01-27 PROCEDURE — 25000003 PHARM REV CODE 250: Performed by: FAMILY MEDICINE

## 2022-01-27 PROCEDURE — G0378 HOSPITAL OBSERVATION PER HR: HCPCS | Mod: CS

## 2022-01-27 PROCEDURE — 99900035 HC TECH TIME PER 15 MIN (STAT)

## 2022-01-27 PROCEDURE — 25000003 PHARM REV CODE 250: Performed by: INTERNAL MEDICINE

## 2022-01-27 PROCEDURE — 85025 COMPLETE CBC W/AUTO DIFF WBC: CPT | Performed by: FAMILY MEDICINE

## 2022-01-27 PROCEDURE — 93306 ECHO (CUPID ONLY): ICD-10-PCS | Mod: 26,,, | Performed by: INTERNAL MEDICINE

## 2022-01-27 PROCEDURE — 83735 ASSAY OF MAGNESIUM: CPT | Performed by: FAMILY MEDICINE

## 2022-01-27 PROCEDURE — 97162 PT EVAL MOD COMPLEX 30 MIN: CPT

## 2022-01-27 PROCEDURE — 36415 COLL VENOUS BLD VENIPUNCTURE: CPT | Performed by: FAMILY MEDICINE

## 2022-01-27 PROCEDURE — 93306 TTE W/DOPPLER COMPLETE: CPT | Mod: 26,,, | Performed by: INTERNAL MEDICINE

## 2022-01-27 PROCEDURE — 84100 ASSAY OF PHOSPHORUS: CPT | Performed by: FAMILY MEDICINE

## 2022-01-27 PROCEDURE — 93017 CV STRESS TEST TRACING ONLY: CPT

## 2022-01-27 PROCEDURE — 99900031 HC PATIENT EDUCATION (STAT)

## 2022-01-27 PROCEDURE — 63600175 PHARM REV CODE 636 W HCPCS: Performed by: NURSE PRACTITIONER

## 2022-01-27 PROCEDURE — 80048 BASIC METABOLIC PNL TOTAL CA: CPT | Performed by: FAMILY MEDICINE

## 2022-01-27 PROCEDURE — 93306 TTE W/DOPPLER COMPLETE: CPT

## 2022-01-27 PROCEDURE — 94761 N-INVAS EAR/PLS OXIMETRY MLT: CPT

## 2022-01-27 PROCEDURE — 99214 PR OFFICE/OUTPT VISIT, EST, LEVL IV, 30-39 MIN: ICD-10-PCS | Mod: ,,, | Performed by: INTERNAL MEDICINE

## 2022-01-27 PROCEDURE — 84484 ASSAY OF TROPONIN QUANT: CPT | Performed by: FAMILY MEDICINE

## 2022-01-27 RX ORDER — ATORVASTATIN CALCIUM 40 MG/1
40 TABLET, FILM COATED ORAL NIGHTLY
Status: DISCONTINUED | OUTPATIENT
Start: 2022-01-27 | End: 2022-01-29 | Stop reason: HOSPADM

## 2022-01-27 RX ORDER — REGADENOSON 0.08 MG/ML
0.4 INJECTION, SOLUTION INTRAVENOUS ONCE
Status: COMPLETED | OUTPATIENT
Start: 2022-01-27 | End: 2022-01-27

## 2022-01-27 RX ADMIN — ATORVASTATIN CALCIUM 40 MG: 40 TABLET, FILM COATED ORAL at 08:01

## 2022-01-27 RX ADMIN — DORZOLAMIDE HYDROCHLORIDE AND TIMOLOL MALEATE 1 DROP: 22.3; 6.8 SOLUTION/ DROPS OPHTHALMIC at 08:01

## 2022-01-27 RX ADMIN — DOCUSATE SODIUM 100 MG: 100 CAPSULE, LIQUID FILLED ORAL at 11:01

## 2022-01-27 RX ADMIN — METOPROLOL TARTRATE 25 MG: 25 TABLET, FILM COATED ORAL at 11:01

## 2022-01-27 RX ADMIN — LATANOPROST 1 DROP: 50 SOLUTION OPHTHALMIC at 08:01

## 2022-01-27 RX ADMIN — DORZOLAMIDE HYDROCHLORIDE AND TIMOLOL MALEATE 1 DROP: 22.3; 6.8 SOLUTION/ DROPS OPHTHALMIC at 12:01

## 2022-01-27 RX ADMIN — METOPROLOL TARTRATE 25 MG: 25 TABLET, FILM COATED ORAL at 08:01

## 2022-01-27 RX ADMIN — WARFARIN SODIUM 1 MG: 1 TABLET ORAL at 05:01

## 2022-01-27 RX ADMIN — ASPIRIN 81 MG: 81 TABLET, DELAYED RELEASE ORAL at 11:01

## 2022-01-27 RX ADMIN — REGADENOSON 0.4 MG: 0.08 INJECTION, SOLUTION INTRAVENOUS at 10:01

## 2022-01-27 RX ADMIN — CALCIUM ACETATE 667 MG: 667 CAPSULE ORAL at 05:01

## 2022-01-27 NOTE — PROGRESS NOTES
Critical access hospital  Department of Cardiology  Progress Note      PATIENT NAME: Tyra Isaac    MRN: [id number]  TODAY'S DATE: 01/27/2022  ADMIT DATE: 1/26/2022                          CONSULT REQUESTED BY: Melissa Wu MD    SUBJECTIVE     1/27/22:  Patient seen in the stress lab this morning.  Patient tolerated Lexiscan stress test without much issue.    PRINCIPAL PROBLEM: Atrial fibrillation with rapid ventricular response  81-year-old female end-stage renal disease on hemodialysis Monday Wednesday Friday ([facility]) , history of anemia associated with CKD, hyperlipidemia, hypertension, AFib on Coumadin presents to the ER for evaluation of AFib with RVR while at hemodialysis today.  She reports that she was just completing her dialysis around 915 when she developed chest pain that is 8/10 midsternal pressure and tightness with associated shortness of breath though she reports both chest pain and shortness of breath or improved now.  She had been placed on 2 L oxygen with EMS oxygen was off on arrival she was 90% and comfortable.  She also reports nausea which is now resolved.  No diaphoresis lightheadedness or dizziness currently.  She did feel lightheaded earlier.  She reports that she is having palpitations with elevated heart rate and feels that she is skipping beats.  But is less severe than at 9:15 a.m. was symptoms began.  She does not know her home medications she has asked us to call her son Raffi     REASON FOR CONSULT:  Chest pain      HPI:  Patient is a 20-year-old lady with known history of end-stage renal disease on hemodialysis goes for HD on Monday Wednesday and Friday.  And she also has underlying atrial fibrillation for which she is on Coumadin history of hypertension hyperlipidemia.  Patient went for her dialysis and during the dialysis patient developed chest discomfort was also short of breath and she was also hypotensive at the same time.  And as a blood pressure improved her  chest pain also got better.  She has no further chest discomfort at the present time.  Her breathing is good denies any shortness of breath or difficulty in breathing she does have intermittent palpitations with elevated heart rate.  And she has to be beats intermittently.        Review of patient's allergies indicates:   Allergen Reactions    Cyclobenzaprine     Fish containing products Hives    Peanut     Tramadol Itching       Past Medical History:   Diagnosis Date    A-fib     Anxiety     Depression     Disorder of kidney and ureter     Encephalopathy acute 1/1/2018    End stage kidney disease 6/17/2017    Gout     Hyperlipidemia     Hypertension     Moderate episode of recurrent major depressive disorder 1/17/2018    Nephropathy hypertensive, stage 5 chronic kidney disease or end stage renal disease 6/17/2017    Obstructive pattern present on pulmonary function testing 7/28/2021    Shows moderate obstruction.    Osteopenia of multiple sites 3/9/2018    Based upon bone density measurements. Patient also has chronic kidney disease.    Stroke 11/2016     Past Surgical History:   Procedure Laterality Date    CARDIAC SURGERY      stents    WRIST SURGERY       Social History     Tobacco Use    Smoking status: Never Smoker    Smokeless tobacco: Never Used   Substance Use Topics    Alcohol use: No    Drug use: No        REVIEW OF SYSTEMS  CONSTITUTIONAL: Negative for chills, fatigue and fever.   EYES: No double vision, No blurred vision  NEURO: No headaches, No dizziness  RESPIRATORY: Negative for cough, shortness of breath and wheezing.   CARDIOVASCULAR:  Episode of chest pain during chest pain. Negative for palpitations and leg swelling.   GI: Negative for abdominal pain, No melena, diarrhea, nausea and vomiting.   : Negative for dysuria and frequency, Negative for hematuria  SKIN: Negative for bruising, Negative for edema or discoloration noted.   ENDOCRINE: Negative for polyphagia,  Negative for heat intolerance, Negative for cold intolerance  PSYCHIATRIC: Negative for depression, Negative for anxiety, Negative for memory loss  MUSCULOSKELETAL: Negative for neck pain, Negative for muscle weakness, Negative for back pain     OBJECTIVE     VITAL SIGNS (Most Recent)  Temp: 98.9 °F (37.2 °C) (01/27/22 1157)  Pulse: 76 (01/27/22 1157)  Resp: 18 (01/27/22 1157)  BP: (!) 143/63 (01/27/22 1157)  SpO2: 96 % (01/27/22 1157)    VENTILATION STATUS  Resp: 18 (01/27/22 1157)  SpO2: 96 % (01/27/22 1157)       I & O (Last 24H):    Intake/Output Summary (Last 24 hours) at 1/27/2022 1204  Last data filed at 1/26/2022 1604  Gross per 24 hour   Intake 120 ml   Output --   Net 120 ml       WEIGHTS  Wt Readings from Last 3 Encounters:   01/26/22 1604 55.6 kg (122 lb 9.2 oz)   01/26/22 1052 55.8 kg (123 lb)   10/11/21 1107 55.8 kg (123 lb)   06/25/21 2342 57 kg (125 lb 10.6 oz)   06/25/21 1857 57.2 kg (126 lb)   06/25/21 1827 57.2 kg (126 lb)       PHYSICAL EXAM  GENERAL: well built, well nourished, well-developed in no apparent distress alert and oriented.   CONSTITUTIONAL: No fever, no chills  HEENT: Normocephalic, atraumatic,pupils reactive to light                 NECK:  No JVD no carotid bruit  CVS: S1S2+, RRR, systolic murmurs,   LUNGS: Clear  ABDOMEN: Soft, NT, BS+  EXTREMITIES: No cyanosis, edema  : No diaz catheter  NEURO: AAO X 3  PSY: Normal affect      HOME MEDICATIONS:  No current facility-administered medications on file prior to encounter.     Current Outpatient Medications on File Prior to Encounter   Medication Sig Dispense Refill    amLODIPine (NORVASC) 5 MG tablet Take 5 mg by mouth once daily.      atorvastatin (LIPITOR) 40 MG tablet Take 40 mg by mouth once daily.      b complex vitamins tablet Take 1 tablet by mouth once daily.      calcium acetate (PHOSLO) 667 mg capsule Take 667 mg by mouth Daily.  6    docusate sodium (COLACE) 100 MG capsule Take 100 mg by mouth once daily.        dorzolamide-timolol 2-0.5% (COSOPT) 22.3-6.8 mg/mL ophthalmic solution Place 1 drop into both eyes 2 (two) times daily.       latanoprost 0.005 % ophthalmic solution Place 1 drop into both eyes every evening.       lidocaine 5 % Crea Apply 1 application topically every Mon, Wed, Fri.       warfarin (COUMADIN) 1 MG tablet Take 1 mg by mouth Daily.         SCHEDULED MEDS:   aspirin  81 mg Oral Daily    atorvastatin  40 mg Oral QHS    calcium acetate(phosphat bind)  667 mg Oral Daily    docusate sodium  100 mg Oral Daily    dorzolamide-timolol 2-0.5%  1 drop Both Eyes BID    latanoprost  1 drop Both Eyes QHS    metoprolol tartrate  25 mg Oral BID    warfarin  1 mg Oral Daily       CONTINUOUS INFUSIONS:    PRN MEDS:acetaminophen, albuterol-ipratropium, dextrose 50%, dextrose 50%, glucagon (human recombinant), glucose, glucose, HYDROcodone-acetaminophen, melatonin, naloxone, ondansetron, polyethylene glycol, simethicone, sodium chloride 0.9%    LABS AND DIAGNOSTICS     CBC LAST 3 DAYS  Recent Labs   Lab 01/26/22  1056 01/27/22  0508   WBC 6.29 5.35   RBC 3.85* 3.35*   HGB 11.7* 10.1*   HCT 37.4 33.1*   MCV 97 99*   MCH 30.4 30.1   MCHC 31.3* 30.5*   RDW 13.4 13.4    229   MPV 9.3 9.5   GRAN 53.6  3.4 48.0  2.6   LYMPH 23.2  1.5 23.2  1.2   MONO 11.9  0.8 14.0  0.8   BASO 0.03 0.03   NRBC 0 0       COAGULATION LAST 3 DAYS  Recent Labs   Lab 01/26/22  1056 01/27/22  0508   LABPT 24.9* 22.2*   INR 2.4 2.1   APTT 34.5  --        CHEMISTRY LAST 3 DAYS  Recent Labs   Lab 01/26/22  1056 01/27/22  0508    140   K 3.1* 4.9   CL 98 101   CO2 28 28   ANIONGAP 13 11   BUN 19 38*   CREATININE 4.4* 6.6*   GLU 76 80   CALCIUM 8.4* 8.6*   MG 1.9 2.1   ALBUMIN 3.6  --    PROT 6.9  --    ALKPHOS 63  --    ALT 27  --    AST 23  --    BILITOT 0.9  --        CARDIAC PROFILE LAST 3 DAYS  Recent Labs   Lab 01/26/22  1056 01/26/22  1056 01/26/22  1651 01/26/22  2319 01/27/22  0508   *  --   --   --   --     TROPONINI 0.033   < > 0.465* 0.531* 0.474*    < > = values in this interval not displayed.       ENDOCRINE LAST 3 DAYS  No results for input(s): TSH, PROCAL in the last 168 hours.    LAST ARTERIAL BLOOD GAS  ABG  No results for input(s): PH, PO2, PCO2, HCO3, BE in the last 168 hours.    LAST 7 DAYS MICROBIOLOGY   Microbiology Results (last 7 days)     ** No results found for the last 168 hours. **          MOST RECENT IMAGING  NM Myocardial Perfusion Spect Multi Pharmacologic  Reason: Chest pain/anginal equiv, intermediate CAD risk, not treadmill candidate; CAD monitoring, CAD on angio or CCTA > 2yrs    Radiopharmaceutical: 9.8 mCi Technetium 99m Myoview utilized for rest imaging. 25.5 mCi Technetium 99m Myoview utilized for stress imaging.  0.4 mg Lexiscan administered for pharmacologic stress.    COMPARISON:None    FINDINGS:  Tomographic images reveal uniform radiopharmaceutical distribution throughout the left ventricular myocardium on stress and rest images. No reversible perfusion defect to suggest myocardial ischemia.    Gated SPECT images show normal left ventricular wall motion. Calculated left ventricular ejection fraction is 74 %.    IMPRESSION:  1. Normal exam. No scintigraphic evidence of left ventricular myocardial ischemia.  2. Normal left ventricular wall motion.  3. Calculated left ventricular ejection fraction of 74 %.    Electronically signed by:  Anthony Espinoza MD  1/27/2022 11:36 AM [facility] Workstation: [id number]      ECHOCARDIOGRAM RESULTS (last 5)  Results for orders placed during the hospital encounter of 03/03/21    Echo Color Flow Doppler? Yes    Interpretation Summary  · The left ventricle is small with concentric remodeling and normal systolic function. The estimated ejection fraction is 63%  · The estimated PA systolic pressure is 38 mmHg.  · Grade II left ventricular diastolic dysfunction.  · Normal right ventricular size with normal right ventricular systolic function.  · Mild-to-moderate  mitral regurgitation.  · Mild to moderate tricuspid regurgitation.  · Normal central venous pressure (3 mmHg).  · The patient converted from atrial fibrillation to normal sinus rhythm 03/03/2021      Results for orders placed during the hospital encounter of 12/13/19    Echo Color Flow Doppler? Yes    Interpretation Summary  · Mild concentric left ventricular hypertrophy.  · Normal left ventricular systolic function. The estimated ejection fraction is 70%  · Grade II (moderate) left ventricular diastolic dysfunction consistent with pseudonormalization.  · The estimated PA systolic pressure is 37 mm Hg  · No wall motion abnormalities.  · Normal right ventricular systolic function.  · Normal central venous pressure (3 mm Hg).  · Mild mitral sclerosis.  · Mild mitral regurgitation.  · Mild to moderate tricuspid regurgitation.      CURRENT/PREVIOUS VISIT EKG  Results for orders placed or performed during the hospital encounter of 01/26/22   EKG 12-lead    Collection Time: 01/26/22 11:45 AM    Narrative    Test Reason : R07.9,    Vent. Rate : 072 BPM     Atrial Rate : 072 BPM     P-R Int : 116 ms          QRS Dur : 074 ms      QT Int : 446 ms       P-R-T Axes : 000 029 -49 degrees     QTc Int : 488 ms    Normal sinus rhythm  ST and T wave abnormality, consider inferior ischemia  ST and T wave abnormality, consider anterolateral ischemia  Prolonged QT  Abnormal ECG  When compared with ECG of 26-JAN-2022 10:49,  Significant changes have occurred    Referred By: [email or web address]   SELF           Confirmed By:            ASSESSMENT/PLAN:     Active Hospital Problems    Diagnosis    *Atrial fibrillation with RVR with history of known paroxysmal atrial fibrillation    Elevated troponin    Other social stressor    Anemia of chronic disease    Anxiety and depression    Chest pain    Obstructive pattern present on pulmonary function testing     Shows moderate obstruction.      ESRD on HD via are right upper extremity AVF MWF     Long term (current) use of anticoagulants    Coronary artery disease of native heart with stable angina pectoris    Paroxysmal atrial fibrillation    Moderate episode of recurrent major depressive disorder    Benign hypertension with ESRD (end-stage renal disease)     BP slightly elevated1.21.15 BP elelvated. Took meds in [facility] will increase amlodipine to 10 mg per day. New prescription sent to the pharmacy. She will double the existing prescription. She will continue to monitor her blood pressures.      Dysthymia     depression of several years after death of her          ASSESSMENT & PLAN:   1.  Chest pain during dialysis  2.  Hypotension during dialysis  3.  Paroxysmal atrial fibrillation,/AFib with RVR  4.  Essential hypertension  5.  Mixed hyperlipidemia  6.  End-stage renal disease on hemodialysis  7.  Hypokalemia      RECOMMENDATIONS:    1. Lexiscan stress test was negative for reversible ischemia.  2. 2D echocardiogram is pending.  3. Continue aspirin 81 mg p.o. daily, atorvastatin 40 mg p.o. daily, metoprolol tartrate 25 mg p.o. b.i.d., and warfarin.  4. From a cardiac standpoint the patient is stable.  If no significant findings on echocardiogram, she can discharge home.  Follow up with primary cardiologist outpatient.         Jacquie Carter NP  [facility]  Department of Cardiology  Date of Service: 01/27/2022      I have seen the patient, reviewed the Nurse Practitioner's history and physical, assessment, plan, progress note and consultation note. I have personally interviewed and examined the patient at bedside and agree with the findings.       Gino Leal MD  Department of Cardiology  [facility]

## 2022-01-27 NOTE — PLAN OF CARE
Medicare Outpatient Observation Notice was signed, explained and given to patient/caregiver on 01/27/2022 at 11:38am     addressed any questions or concerns.    Medicare Outpatient Observation Notice will be scanned into patient's medical record
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
## 2022-01-27 NOTE — PLAN OF CARE
01/27/22 1516   Patient Assessment/Suction   Level of Consciousness (AVPU) alert   Respiratory Effort Normal;Unlabored   All Lung Fields Breath Sounds clear   PRE-TX-O2   O2 Device (Oxygen Therapy) room air   SpO2 95 %   Pulse Oximetry Type Continuous   $ Pulse Oximetry - Multiple Charge Pulse Oximetry - Multiple   Pulse 70   Resp 18   Aerosol Therapy   $ Aerosol Therapy Charges PRN treatment not required   Respiratory Treatment Status (SVN) PRN treatment not required   Education   $ Education Bronchodilator;15 min   Respiratory Evaluation   $ Care Plan Tech Time 15 min
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
## 2022-01-27 NOTE — PLAN OF CARE
[facility]  Initial Discharge Assessment       Primary Care Provider: Kalpesh Goel MD    Admission Diagnosis: Atrial fibrillation with rapid ventricular response [I48.91]    Admission Date: 1/26/2022  Expected Discharge Date: 1/28/2022     Assessment completed: At bedside with patient  Advanced Directives: Living will/POA-Marcelo Clements  Marital Status:   Children: 3 adult  Next of Kin:  Children    Needs: None    Discharge Plan: Home. She has services from  [facility].    CM  will continue to follow.             Payor: [employer] MEDICARE / Plan: [employer] MEDICARE HMO / Product Type: Capitation /     Extended Emergency Contact Information  Primary Emergency Contact: Poncho Marcelo  Address: [address] [place] [address] [place] [address] [place]  Home Phone: [phone]  Relation: Son   needed? No    Discharge Plan A: Home  Discharge Plan B: Home      [id number] [place] - [address]  [address]  [place] [address]  Phone: [phone] Fax: [phone]      Initial Assessment (most recent)     Adult Discharge Assessment - 01/27/22 1612        Discharge Assessment    Assessment Type Discharge Planning Assessment     Confirmed/corrected address, phone number and insurance Yes     Confirmed Demographics Correct on Facesheet     Source of Information patient     When was your last doctors appointment? --   3 weeks ago    Does patient/caregiver understand observation status Yes     Communicated ILAN with patient/caregiver Date not available/Unable to determine     Reason For Admission Chest pain     Lives With alone     Facility Arrived From: [facility]     Do you expect to return to your current living situation? Yes     Do you have help at home or someone to help you manage your care at home? Yes     Who are your caregiver(s) and their phone number(s)? Karli and Marcelo Isaac (son)  [phone]     Prior to hospitilization cognitive status: Alert/Oriented     Current cognitive status: Alert/Oriented     Walking or Climbing Stairs Difficulty none     Dressing/Bathing Difficulty none     Equipment Currently Used at Home none     Readmission within 30 days? No     Patient currently being followed by outpatient case management? No     Do you currently have service(s) that help you manage your care at home? No     Do you take prescription medications? Yes     Do you have prescription coverage? Yes     Coverage [employer]     Do you have any problems affording any of your prescribed medications? No     Is the patient taking medications as prescribed? yes     Who is going to help you get home at discharge? Son     How do you get to doctors appointments? family or friend will provide     Are you on dialysis? Yes     Dialysis Name and Scheduled days [facility]     Do you take coumadin? Yes     Who monitors your labs? Dr Britton     Discharge Plan A Home     Discharge Plan B Home     DME Needed Upon Discharge  none     Discharge Plan discussed with: Patient

## 2022-01-27 NOTE — HPI
Tyra Isaac is a 81 y.o. Black or  female   With PMH of pAF on coumadin, HTN, ESRD on HD   who presents with chest pain.     Onset today after her HD session this morning  Located substernal, described as tightness and pressure  Constant and severe  +SOB, +nausea, +palpitations, +dizziness  No fever or chills  Per EMS she was hypotensive before arrival  Found to be a-fib RVR in the ER  She was given diltiazem bolus + asa   She converted to nsr and all symptoms resolved  She reports that currently she is only hungry  She is not currently taking rate control or antiarrhythmics medication
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
## 2022-01-27 NOTE — PROGRESS NOTES
@  09:35 OBTAINED RESTING IMAGES    REMAIN NPO STATUS.     Cimetidine Pregnancy And Lactation Text: This medication is Pregnancy Category B and is considered safe during pregnancy. It is also excreted in breast milk and breast feeding isn't recommended.

## 2022-01-27 NOTE — PROGRESS NOTES
INPATIENT NEPHROLOGY Progress Note   [facility]    Patient Name: Tyra Isaac  Date: 01/27/2022    Reason for consultation: ESRD    Chief Complaint:   Chief Complaint   Patient presents with    Chest Pain     Patient reports CP after finishing dialysis today        History of Present Illness:  80 y/o F with a history of pAF, HTN and ESRD on HD MWF who p/f HD unit after completing HD with chest pain, + dyspnea/nausea/palpitations/diaphoresis, found to be in AF with RVR with hypotension, getting medical management, consulted for dialysis.    Interval History:  1/26- feeling better, lots of social stressors, in NSR on exam  1/27- VSS, on RA, nuclear stress test negative    Echo:  · The left ventricle is normal in size with mild concentric hypertrophy and normal systolic function.  · The estimated ejection fraction is 65%.  · Grade II left ventricular diastolic dysfunction.  · Atrial fibrillation not observed.  · Normal right ventricular size with normal right ventricular systolic function.  · Moderate to severe left atrial enlargement.  · Mild right atrial enlargement.  · Mild mitral regurgitation.  · Mild to moderate tricuspid regurgitation.  · Normal central venous pressure (3 mmHg).  · The estimated PA systolic pressure is 36 mmHg.  · Mildly elevated gradient accross mitral valve of around 6 mmHg at HR of 64 bpm. MVA by pressure half time is normal, however this can be inaccurate.    Plan of Care:    Assessment:  ESRD on HD MWF  HTN  AF with RVR  HypoK/HypoMg  SHPT  Anemia of CKD    Plan:    - Will continue HD MWF.  - Will adjust UF goal to dry weight.  - ACS workup is neg. Echo reviewed. Suspect chest pain from demand ischemia due to AF with RVR/hypotension. She is currently in NSR and symptom free.  - K, Mg are replete.  - Check phos.  - Hb is at goal- no acute BRAYAN needs.     Thank you for allowing us to participate in this patient's care. We will continue to follow.    Vital  Signs:  Temp Readings from Last 3 Encounters:   01/27/22 98.4 °F (36.9 °C) (Oral)   10/11/21 98.6 °F (37 °C)   06/26/21 98.6 °F (37 °C)       Pulse Readings from Last 3 Encounters:   01/27/22 69   10/11/21 87   06/26/21 (!) 54       BP Readings from Last 3 Encounters:   01/27/22 132/63   10/11/21 134/70   06/26/21 109/61       Weight:  Wt Readings from Last 3 Encounters:   01/26/22 55.6 kg (122 lb 9.2 oz)   10/11/21 55.8 kg (123 lb)   06/25/21 57 kg (125 lb 10.6 oz)       Medications:  Scheduled Meds:   aspirin  81 mg Oral Daily    atorvastatin  40 mg Oral QHS    calcium acetate(phosphat bind)  667 mg Oral Daily    docusate sodium  100 mg Oral Daily    dorzolamide-timolol 2-0.5%  1 drop Both Eyes BID    latanoprost  1 drop Both Eyes QHS    metoprolol tartrate  25 mg Oral BID    warfarin  1 mg Oral Daily     Continuous Infusions:  PRN Meds:.acetaminophen, albuterol-ipratropium, dextrose 50%, dextrose 50%, glucagon (human recombinant), glucose, glucose, HYDROcodone-acetaminophen, melatonin, naloxone, ondansetron, polyethylene glycol, simethicone, sodium chloride 0.9%  No current facility-administered medications on file prior to encounter.     Current Outpatient Medications on File Prior to Encounter   Medication Sig Dispense Refill    amLODIPine (NORVASC) 5 MG tablet Take 5 mg by mouth once daily.      atorvastatin (LIPITOR) 40 MG tablet Take 40 mg by mouth once daily.      b complex vitamins tablet Take 1 tablet by mouth once daily.      calcium acetate (PHOSLO) 667 mg capsule Take 667 mg by mouth Daily.  6    docusate sodium (COLACE) 100 MG capsule Take 100 mg by mouth once daily.       dorzolamide-timolol 2-0.5% (COSOPT) 22.3-6.8 mg/mL ophthalmic solution Place 1 drop into both eyes 2 (two) times daily.       latanoprost 0.005 % ophthalmic solution Place 1 drop into both eyes every evening.       lidocaine 5 % Crea Apply 1 application topically every Mon, Wed, Fri.       warfarin (COUMADIN) 1 MG  tablet Take 1 mg by mouth Daily.       Review of Systems:  Neg    Physical Exam:  Constitutional: nad, aao x 3  Heart: rrr, no m/r/g, wwp, no edema  Lungs: ctab, no w/r/r/c, no lb  Abdomen: s/nt/nd, +BS    Results:  Lab Results   Component Value Date     01/27/2022    K 4.9 01/27/2022     01/27/2022    CO2 28 01/27/2022    BUN 38 (H) 01/27/2022    CREATININE 6.6 (H) 01/27/2022    CALCIUM 8.6 (L) 01/27/2022    ANIONGAP 11 01/27/2022    ESTGFRAFRICA 6.2 (A) 01/27/2022    EGFRNONAA 5.4 (A) 01/27/2022       Lab Results   Component Value Date    CALCIUM 8.6 (L) 01/27/2022    PHOS 3.2 12/14/2019       Recent Labs   Lab 01/27/22  0508   WBC 5.35   RBC 3.35*   HGB 10.1*   HCT 33.1*      MCV 99*   MCH 30.1   MCHC 30.5*       I have personally reviewed pertinent radiological imaging and reports.    I have spent > 35 minutes providing care for this patient for the above diagnoses. These services have included chart/data/imaging review, evaluation, exam, formulation of plan, , note preparation, and discussions with staff involved in this patient's care.    Rose Maynard MD MPH  [facility] [address] [address]  [phone] (p)  [phone] (f)

## 2022-01-27 NOTE — PT/OT/SLP EVAL
Physical Therapy Evaluation and Discharge Note    Patient Name:  Tyra Isaac   MRN:  [id number]    Recommendations:     Discharge Recommendations:  home   Discharge Equipment Recommendations: rollator   Barriers to discharge: None    Assessment:     Tyra Isaac is a 81 y.o. female admitted with a medical diagnosis of Atrial fibrillation with rapid ventricular response. .  At this time, patient is functioning at their prior level of function and does not require further acute PT services.     Recent Surgery: * No surgery found *      Plan:     During this hospitalization, patient does not require further acute PT services.  Please re-consult if situation changes.      Subjective     Chief Complaint: none reported  Patient/Family Comments/goals: D/C home  Pain/Comfort:  ·      Patients cultural, spiritual, Shinto conflicts given the current situation:      Living Environment:  Pt lives at  [facility] alone in a ones story house without entry steps. Her son does spends much time with her.  Prior to admission, patients level of function was independent.  Equipment used at home: none.  DME owned (not currently used): none.  Upon discharge, patient will have assistance from her son.    Objective:     Communicated with nurse  prior to session.  Patient found supine with   upon PT entry to room.    General Precautions: Standard, fall   Orthopedic Precautions:    Braces:     Respiratory Status: Room air    Exams:  · Cognitive Exam:  Patient is oriented to Person, Place, Time and Situation  · RLE ROM: WFL  · RLE Strength: WFL  · LLE ROM: WFL  · LLE Strength: WFL    Functional Mobility:  · Bed Mobility:     · Supine to Sit: stand by assistance  · Sit to Supine: stand by assistance  · Transfers:     · Sit to Stand:  modified independence with rolling walker  · Gait: 125 ft RW and SBA    AM-PAC 6 CLICK MOBILITY  Total Score:        Therapeutic Activities and Exercises:  Pt ambulated in the myers with RW and SBA with  "report of increased stability with AD.Pt requested rollator " So  "I can sit down." as needed.    AM-PAC 6 CLICK MOBILITY  Total Score:      Patient left supine with all lines intact, call button in reach and bed alarm on.    GOALS:   Multidisciplinary Problems     Physical Therapy Goals     Not on file                History:     Past Medical History:   Diagnosis Date    A-fib     Anxiety     Depression     Disorder of kidney and ureter     Encephalopathy acute 1/1/2018    End stage kidney disease 6/17/2017    Gout     Hyperlipidemia     Hypertension     Moderate episode of recurrent major depressive disorder 1/17/2018    Nephropathy hypertensive, stage 5 chronic kidney disease or end stage renal disease 6/17/2017    Obstructive pattern present on pulmonary function testing 7/28/2021    Shows moderate obstruction.    Osteopenia of multiple sites 3/9/2018    Based upon bone density measurements. Patient also has chronic kidney disease.    Stroke 11/2016       Past Surgical History:   Procedure Laterality Date    CARDIAC SURGERY      stents    WRIST SURGERY         Time Tracking:     PT Received On: 01/27/22  PT Start Time: 1430     PT Stop Time: 1450  PT Total Time (min): 20 min     Billable Minutes: Evaluation 10 minutes and Gait Training 10 minutes      01/27/2022  "

## 2022-01-28 PROBLEM — R07.9 CHEST PAIN: Status: RESOLVED | Noted: 2022-01-26 | Resolved: 2022-01-28

## 2022-01-28 LAB
ANION GAP SERPL CALC-SCNC: 11 MMOL/L (ref 8–16)
BASOPHILS # BLD AUTO: 0.02 K/UL (ref 0–0.2)
BASOPHILS NFR BLD: 0.3 % (ref 0–1.9)
BUN SERPL-MCNC: 53 MG/DL (ref 8–23)
CALCIUM SERPL-MCNC: 8.7 MG/DL (ref 8.7–10.5)
CHLORIDE SERPL-SCNC: 103 MMOL/L (ref 95–110)
CO2 SERPL-SCNC: 26 MMOL/L (ref 23–29)
CREAT SERPL-MCNC: 8.4 MG/DL (ref 0.5–1.4)
CV PHARM DOSE: 0.4 MG
CV STRESS BASE HR: 67 BPM
DIASTOLIC BLOOD PRESSURE: 66 MMHG
DIFFERENTIAL METHOD: ABNORMAL
EOSINOPHIL # BLD AUTO: 0.8 K/UL (ref 0–0.5)
EOSINOPHIL NFR BLD: 13.9 % (ref 0–8)
ERYTHROCYTE [DISTWIDTH] IN BLOOD BY AUTOMATED COUNT: 13.2 % (ref 11.5–14.5)
EST. GFR  (AFRICAN AMERICAN): 4.7 ML/MIN/1.73 M^2
EST. GFR  (NON AFRICAN AMERICAN): 4 ML/MIN/1.73 M^2
GLUCOSE SERPL-MCNC: 130 MG/DL (ref 70–110)
GLUCOSE SERPL-MCNC: 144 MG/DL (ref 70–110)
GLUCOSE SERPL-MCNC: 74 MG/DL (ref 70–110)
GLUCOSE SERPL-MCNC: 75 MG/DL (ref 70–110)
GLUCOSE SERPL-MCNC: 81 MG/DL (ref 70–110)
HCT VFR BLD AUTO: 32.8 % (ref 37–48.5)
HGB BLD-MCNC: 10.1 G/DL (ref 12–16)
IMM GRANULOCYTES # BLD AUTO: 0.02 K/UL (ref 0–0.04)
IMM GRANULOCYTES NFR BLD AUTO: 0.3 % (ref 0–0.5)
INR PPP: 1.8
LYMPHOCYTES # BLD AUTO: 1.1 K/UL (ref 1–4.8)
LYMPHOCYTES NFR BLD: 19.6 % (ref 18–48)
MAGNESIUM SERPL-MCNC: 2.3 MG/DL (ref 1.6–2.6)
MCH RBC QN AUTO: 30.4 PG (ref 27–31)
MCHC RBC AUTO-ENTMCNC: 30.8 G/DL (ref 32–36)
MCV RBC AUTO: 99 FL (ref 82–98)
MONOCYTES # BLD AUTO: 0.8 K/UL (ref 0.3–1)
MONOCYTES NFR BLD: 13.2 % (ref 4–15)
NEUTROPHILS # BLD AUTO: 3.1 K/UL (ref 1.8–7.7)
NEUTROPHILS NFR BLD: 52.7 % (ref 38–73)
NRBC BLD-RTO: 0 /100 WBC
OHS CV CPX 1 MINUTE RECOVERY HEART RATE: 93 BPM
OHS CV CPX 85 PERCENT MAX PREDICTED HEART RATE MALE: 115
OHS CV CPX MAX PREDICTED HEART RATE: 135
OHS CV CPX PATIENT IS FEMALE: 1
OHS CV CPX PATIENT IS MALE: 0
OHS CV CPX PEAK DIASTOLIC BLOOD PRESSURE: 64 MMHG
OHS CV CPX PEAK HEAR RATE: 98 BPM
OHS CV CPX PEAK RATE PRESSURE PRODUCT: NORMAL
OHS CV CPX PEAK SYSTOLIC BLOOD PRESSURE: 134 MMHG
OHS CV CPX PERCENT MAX PREDICTED HEART RATE ACHIEVED: 73
OHS CV CPX RATE PRESSURE PRODUCT PRESENTING: 8576
PLATELET # BLD AUTO: 223 K/UL (ref 150–450)
PMV BLD AUTO: 9.6 FL (ref 9.2–12.9)
POTASSIUM SERPL-SCNC: 4.8 MMOL/L (ref 3.5–5.1)
PROTHROMBIN TIME: 20.2 SEC (ref 11.4–13.7)
RBC # BLD AUTO: 3.32 M/UL (ref 4–5.4)
SODIUM SERPL-SCNC: 140 MMOL/L (ref 136–145)
SYSTOLIC BLOOD PRESSURE: 128 MMHG
WBC # BLD AUTO: 5.83 K/UL (ref 3.9–12.7)

## 2022-01-28 PROCEDURE — 94761 N-INVAS EAR/PLS OXIMETRY MLT: CPT

## 2022-01-28 PROCEDURE — G0378 HOSPITAL OBSERVATION PER HR: HCPCS | Mod: CS

## 2022-01-28 PROCEDURE — 80048 BASIC METABOLIC PNL TOTAL CA: CPT | Performed by: FAMILY MEDICINE

## 2022-01-28 PROCEDURE — 85025 COMPLETE CBC W/AUTO DIFF WBC: CPT | Performed by: FAMILY MEDICINE

## 2022-01-28 PROCEDURE — 25000003 PHARM REV CODE 250: Performed by: INTERNAL MEDICINE

## 2022-01-28 PROCEDURE — 99900031 HC PATIENT EDUCATION (STAT)

## 2022-01-28 PROCEDURE — 83735 ASSAY OF MAGNESIUM: CPT | Performed by: FAMILY MEDICINE

## 2022-01-28 PROCEDURE — 90935 HEMODIALYSIS ONE EVALUATION: CPT

## 2022-01-28 PROCEDURE — 36415 COLL VENOUS BLD VENIPUNCTURE: CPT | Performed by: FAMILY MEDICINE

## 2022-01-28 PROCEDURE — 25000003 PHARM REV CODE 250: Performed by: FAMILY MEDICINE

## 2022-01-28 PROCEDURE — 85610 PROTHROMBIN TIME: CPT | Performed by: FAMILY MEDICINE

## 2022-01-28 PROCEDURE — 99900035 HC TECH TIME PER 15 MIN (STAT)

## 2022-01-28 RX ORDER — WARFARIN 1 MG/1
1 TABLET ORAL DAILY
Qty: 30 TABLET | Refills: 0 | Status: SHIPPED | OUTPATIENT
Start: 2022-01-28 | End: 2022-02-27

## 2022-01-28 RX ORDER — ONDANSETRON 4 MG/1
4 TABLET, ORALLY DISINTEGRATING ORAL EVERY 6 HOURS PRN
Status: DISCONTINUED | OUTPATIENT
Start: 2022-01-28 | End: 2022-01-29 | Stop reason: HOSPADM

## 2022-01-28 RX ORDER — METOPROLOL TARTRATE 25 MG/1
25 TABLET, FILM COATED ORAL 2 TIMES DAILY
Qty: 60 TABLET | Refills: 0 | Status: ON HOLD | OUTPATIENT
Start: 2022-01-28 | End: 2022-02-08 | Stop reason: HOSPADM

## 2022-01-28 RX ADMIN — DORZOLAMIDE HYDROCHLORIDE AND TIMOLOL MALEATE 1 DROP: 22.3; 6.8 SOLUTION/ DROPS OPHTHALMIC at 09:01

## 2022-01-28 RX ADMIN — DORZOLAMIDE HYDROCHLORIDE AND TIMOLOL MALEATE 1 DROP: 22.3; 6.8 SOLUTION/ DROPS OPHTHALMIC at 08:01

## 2022-01-28 RX ADMIN — ONDANSETRON 4 MG: 4 TABLET, ORALLY DISINTEGRATING ORAL at 10:01

## 2022-01-28 RX ADMIN — METOPROLOL TARTRATE 25 MG: 25 TABLET, FILM COATED ORAL at 08:01

## 2022-01-28 RX ADMIN — DOCUSATE SODIUM 100 MG: 100 CAPSULE, LIQUID FILLED ORAL at 09:01

## 2022-01-28 RX ADMIN — ASPIRIN 81 MG: 81 TABLET, DELAYED RELEASE ORAL at 09:01

## 2022-01-28 RX ADMIN — ATORVASTATIN CALCIUM 40 MG: 40 TABLET, FILM COATED ORAL at 08:01

## 2022-01-28 RX ADMIN — METOPROLOL TARTRATE 25 MG: 25 TABLET, FILM COATED ORAL at 09:01

## 2022-01-28 RX ADMIN — LATANOPROST 1 DROP: 50 SOLUTION OPHTHALMIC at 08:01

## 2022-01-28 NOTE — PROGRESS NOTES
INPATIENT NEPHROLOGY Progress Note   [facility]    Patient Name: Tyra Isaac  Date: 01/28/2022    Reason for consultation: ESRD    Chief Complaint:   Chief Complaint   Patient presents with    Chest Pain     Patient reports CP after finishing dialysis today        History of Present Illness:  80 y/o F with a history of pAF, HTN and ESRD on HD MWF who p/f HD unit after completing HD with chest pain, + dyspnea/nausea/palpitations/diaphoresis, found to be in AF with RVR with hypotension, getting medical management, consulted for dialysis.    Interval History:  1/26- feeling better, lots of social stressors, in NSR on exam  1/27- VSS, on RA, nuclear stress test negative  1/28- VSS, on RA, no complaints    Echo:  · The left ventricle is normal in size with mild concentric hypertrophy and normal systolic function.  · The estimated ejection fraction is 65%.  · Grade II left ventricular diastolic dysfunction.  · Atrial fibrillation not observed.  · Normal right ventricular size with normal right ventricular systolic function.  · Moderate to severe left atrial enlargement.  · Mild right atrial enlargement.  · Mild mitral regurgitation.  · Mild to moderate tricuspid regurgitation.  · Normal central venous pressure (3 mmHg).  · The estimated PA systolic pressure is 36 mmHg.  · Mildly elevated gradient accross mitral valve of around 6 mmHg at HR of 64 bpm. MVA by pressure half time is normal, however this can be inaccurate.    Plan of Care:    Assessment:  ESRD on HD MWF  HTN  AF with RVR  HypoK/HypoMg  SHPT  Anemia of CKD    Plan:    - Continue HD MWF.  - Adjust UF goal to dry weight.  - ACS workup is neg. Echo reviewed. Suspect chest pain from demand ischemia due to AF with RVR/hypotension. She is currently in NSR and symptom free.  - K, Mg are replete.  - Phos is at goal.  - Hb is at goal- no acute BRAYAN needs.     Thank you for allowing us to participate in this patient's care. We will continue to  follow.    Vital Signs:  Temp Readings from Last 3 Encounters:   01/28/22 98.4 °F (36.9 °C) (Oral)   10/11/21 98.6 °F (37 °C)   06/26/21 98.6 °F (37 °C)       Pulse Readings from Last 3 Encounters:   01/28/22 78   10/11/21 87   06/26/21 (!) 54       BP Readings from Last 3 Encounters:   01/28/22 118/65   10/11/21 134/70   06/26/21 109/61       Weight:  Wt Readings from Last 3 Encounters:   01/26/22 55.6 kg (122 lb 9.2 oz)   10/11/21 55.8 kg (123 lb)   06/25/21 57 kg (125 lb 10.6 oz)       Medications:  Scheduled Meds:   aspirin  81 mg Oral Daily    atorvastatin  40 mg Oral QHS    calcium acetate(phosphat bind)  667 mg Oral Daily    docusate sodium  100 mg Oral Daily    dorzolamide-timolol 2-0.5%  1 drop Both Eyes BID    latanoprost  1 drop Both Eyes QHS    metoprolol tartrate  25 mg Oral BID    warfarin  1 mg Oral Daily     Continuous Infusions:  PRN Meds:.acetaminophen, albuterol-ipratropium, dextrose 50%, dextrose 50%, glucagon (human recombinant), glucose, glucose, HYDROcodone-acetaminophen, melatonin, naloxone, ondansetron, polyethylene glycol, simethicone, sodium chloride 0.9%  No current facility-administered medications on file prior to encounter.     Current Outpatient Medications on File Prior to Encounter   Medication Sig Dispense Refill    atorvastatin (LIPITOR) 40 MG tablet Take 40 mg by mouth once daily.      b complex vitamins tablet Take 1 tablet by mouth once daily.      calcium acetate (PHOSLO) 667 mg capsule Take 667 mg by mouth Daily.  6    docusate sodium (COLACE) 100 MG capsule Take 100 mg by mouth once daily.       dorzolamide-timolol 2-0.5% (COSOPT) 22.3-6.8 mg/mL ophthalmic solution Place 1 drop into both eyes 2 (two) times daily.       latanoprost 0.005 % ophthalmic solution Place 1 drop into both eyes every evening.       lidocaine 5 % Crea Apply 1 application topically every Mon, Wed, Fri.       [DISCONTINUED] amLODIPine (NORVASC) 5 MG tablet Take 5 mg by mouth once daily.       [DISCONTINUED] warfarin (COUMADIN) 1 MG tablet Take 1 mg by mouth Daily.       Review of Systems:  Neg    Physical Exam:  Constitutional: nad, aao x 3  Heart: rrr, no m/r/g, wwp, no edema  Lungs: ctab, no w/r/r/c, no lb  Abdomen: s/nt/nd, +BS    Results:  Lab Results   Component Value Date     01/28/2022    K 4.8 01/28/2022     01/28/2022    CO2 26 01/28/2022    BUN 53 (H) 01/28/2022    CREATININE 8.4 (H) 01/28/2022    CALCIUM 8.7 01/28/2022    ANIONGAP 11 01/28/2022    ESTGFRAFRICA 4.7 (A) 01/28/2022    EGFRNONAA 4.0 (A) 01/28/2022       Lab Results   Component Value Date    CALCIUM 8.7 01/28/2022    PHOS 3.6 01/27/2022       Recent Labs   Lab 01/28/22  0515   WBC 5.83   RBC 3.32*   HGB 10.1*   HCT 32.8*      MCV 99*   MCH 30.4   MCHC 30.8*       I have personally reviewed pertinent radiological imaging and reports.    I have spent > 35 minutes providing care for this patient for the above diagnoses. These services have included chart/data/imaging review, evaluation, exam, formulation of plan, , note preparation, and discussions with staff involved in this patient's care.    Rose Maynard MD MPH  [facility] [address] [address]  [phone] (p)  [phone] (f)

## 2022-01-28 NOTE — PLAN OF CARE
1143 Requested approval from Titi with [facility], via chat, to pull a rolling walker from the [employer].    1430 Received approval from [place] to pull the rolling walker. Went to deliver the walker and patient refused. She stated that she does not like that type of walker; her wrist is broken and she cannot lift it.  informed her that she should not be lifting it. She still declined. This info was relayed to Dr Wu.

## 2022-01-28 NOTE — PLAN OF CARE
01/28/22 0848   Patient Assessment/Suction   Level of Consciousness (AVPU) alert   Respiratory Effort Normal;Unlabored   All Lung Fields Breath Sounds clear   PRE-TX-O2   O2 Device (Oxygen Therapy) room air   SpO2 95 %   Pulse Oximetry Type Intermittent   $ Pulse Oximetry - Multiple Charge Pulse Oximetry - Multiple   Pulse 63   Resp 18   Aerosol Therapy   $ Aerosol Therapy Charges PRN treatment not required   Respiratory Treatment Status (SVN) PRN treatment not required   Education   $ Education Bronchodilator;15 min   Respiratory Evaluation   $ Care Plan Tech Time 15 min
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
## 2022-01-29 VITALS
WEIGHT: 122.56 LBS | HEART RATE: 62 BPM | BODY MASS INDEX: 20.42 KG/M2 | TEMPERATURE: 98 F | OXYGEN SATURATION: 97 % | HEIGHT: 65 IN | SYSTOLIC BLOOD PRESSURE: 96 MMHG | RESPIRATION RATE: 18 BRPM | DIASTOLIC BLOOD PRESSURE: 50 MMHG

## 2022-01-29 LAB
ANION GAP SERPL CALC-SCNC: 12 MMOL/L (ref 8–16)
BASOPHILS # BLD AUTO: 0.03 K/UL (ref 0–0.2)
BASOPHILS NFR BLD: 0.5 % (ref 0–1.9)
BUN SERPL-MCNC: 27 MG/DL (ref 8–23)
CALCIUM SERPL-MCNC: 8.7 MG/DL (ref 8.7–10.5)
CHLORIDE SERPL-SCNC: 96 MMOL/L (ref 95–110)
CO2 SERPL-SCNC: 33 MMOL/L (ref 23–29)
CREAT SERPL-MCNC: 6 MG/DL (ref 0.5–1.4)
DIFFERENTIAL METHOD: ABNORMAL
EOSINOPHIL # BLD AUTO: 0.3 K/UL (ref 0–0.5)
EOSINOPHIL NFR BLD: 4.9 % (ref 0–8)
ERYTHROCYTE [DISTWIDTH] IN BLOOD BY AUTOMATED COUNT: 13.3 % (ref 11.5–14.5)
EST. GFR  (AFRICAN AMERICAN): 7 ML/MIN/1.73 M^2
EST. GFR  (NON AFRICAN AMERICAN): 6.1 ML/MIN/1.73 M^2
GLUCOSE SERPL-MCNC: 146 MG/DL (ref 70–110)
GLUCOSE SERPL-MCNC: 76 MG/DL (ref 70–110)
GLUCOSE SERPL-MCNC: 88 MG/DL (ref 70–110)
HCT VFR BLD AUTO: 35.9 % (ref 37–48.5)
HGB BLD-MCNC: 11 G/DL (ref 12–16)
IMM GRANULOCYTES # BLD AUTO: 0.01 K/UL (ref 0–0.04)
IMM GRANULOCYTES NFR BLD AUTO: 0.2 % (ref 0–0.5)
INR PPP: 1.5
LYMPHOCYTES # BLD AUTO: 1.2 K/UL (ref 1–4.8)
LYMPHOCYTES NFR BLD: 20.1 % (ref 18–48)
MAGNESIUM SERPL-MCNC: 2.1 MG/DL (ref 1.6–2.6)
MCH RBC QN AUTO: 30.6 PG (ref 27–31)
MCHC RBC AUTO-ENTMCNC: 30.6 G/DL (ref 32–36)
MCV RBC AUTO: 100 FL (ref 82–98)
MONOCYTES # BLD AUTO: 0.8 K/UL (ref 0.3–1)
MONOCYTES NFR BLD: 13.5 % (ref 4–15)
NEUTROPHILS # BLD AUTO: 3.5 K/UL (ref 1.8–7.7)
NEUTROPHILS NFR BLD: 60.8 % (ref 38–73)
NRBC BLD-RTO: 0 /100 WBC
PLATELET # BLD AUTO: 230 K/UL (ref 150–450)
PMV BLD AUTO: 9.6 FL (ref 9.2–12.9)
POTASSIUM SERPL-SCNC: 4.6 MMOL/L (ref 3.5–5.1)
PROTHROMBIN TIME: 17 SEC (ref 11.4–13.7)
RBC # BLD AUTO: 3.6 M/UL (ref 4–5.4)
SODIUM SERPL-SCNC: 141 MMOL/L (ref 136–145)
WBC # BLD AUTO: 5.77 K/UL (ref 3.9–12.7)

## 2022-01-29 PROCEDURE — 25000003 PHARM REV CODE 250: Performed by: FAMILY MEDICINE

## 2022-01-29 PROCEDURE — 85025 COMPLETE CBC W/AUTO DIFF WBC: CPT | Performed by: FAMILY MEDICINE

## 2022-01-29 PROCEDURE — 85610 PROTHROMBIN TIME: CPT | Performed by: FAMILY MEDICINE

## 2022-01-29 PROCEDURE — 80048 BASIC METABOLIC PNL TOTAL CA: CPT | Performed by: FAMILY MEDICINE

## 2022-01-29 PROCEDURE — 36415 COLL VENOUS BLD VENIPUNCTURE: CPT | Performed by: FAMILY MEDICINE

## 2022-01-29 PROCEDURE — G0378 HOSPITAL OBSERVATION PER HR: HCPCS

## 2022-01-29 PROCEDURE — 99900035 HC TECH TIME PER 15 MIN (STAT)

## 2022-01-29 PROCEDURE — 94760 N-INVAS EAR/PLS OXIMETRY 1: CPT

## 2022-01-29 PROCEDURE — 83735 ASSAY OF MAGNESIUM: CPT | Performed by: FAMILY MEDICINE

## 2022-01-29 RX ADMIN — DORZOLAMIDE HYDROCHLORIDE AND TIMOLOL MALEATE 1 DROP: 22.3; 6.8 SOLUTION/ DROPS OPHTHALMIC at 10:01

## 2022-01-29 RX ADMIN — DOCUSATE SODIUM 100 MG: 100 CAPSULE, LIQUID FILLED ORAL at 10:01

## 2022-01-29 RX ADMIN — ASPIRIN 81 MG: 81 TABLET, DELAYED RELEASE ORAL at 10:01

## 2022-01-29 RX ADMIN — METOPROLOL TARTRATE 25 MG: 25 TABLET, FILM COATED ORAL at 10:01

## 2022-01-29 NOTE — NURSING
Patient is alert, stable and oriented. Breathing is non labored and even. Last VSS. Iv dc'd the night before. Patient NAD. Patient stated understanding of DC instructions. Patient discharged via wheelchair with nurse tech. Patient's son has all of her belongings.

## 2022-01-29 NOTE — CARE UPDATE
01/29/22 1035   PRE-TX-O2   O2 Device (Oxygen Therapy) room air   SpO2 96 %   Pulse Oximetry Type Intermittent   $ Pulse Oximetry - Single Charge Pulse Oximetry - Single   Pulse 68   Resp 18   Aerosol Therapy   $ Aerosol Therapy Charges PRN treatment not required   Respiratory Evaluation   $ Care Plan Tech Time 15 min

## 2022-01-29 NOTE — PLAN OF CARE
Chart and discharge orders reviewed.  Patient discharged home with no further case management needs         01/29/22 1119   Final Note   Assessment Type Final Discharge Note   Anticipated Discharge Disposition Home   What phone number can be called within the next 1-3 days to see how you are doing after discharge? [phone]

## 2022-01-31 ENCOUNTER — TELEPHONE (OUTPATIENT)
Dept: FAMILY MEDICINE | Facility: CLINIC | Age: 82
End: 2022-01-31
Payer: MEDICARE

## 2022-01-31 NOTE — TELEPHONE ENCOUNTER
Attempted to reach patient and schedule hospital follow up appointment and ask TCC questions. No answer at preferred number and no option for Voice Mail.

## 2022-02-01 ENCOUNTER — TELEPHONE (OUTPATIENT)
Dept: FAMILY MEDICINE | Facility: CLINIC | Age: 82
End: 2022-02-01
Payer: MEDICARE

## 2022-02-02 ENCOUNTER — HOSPITAL ENCOUNTER (INPATIENT)
Facility: HOSPITAL | Age: 82
LOS: 6 days | Discharge: HOME | DRG: 286 | End: 2022-02-08
Attending: EMERGENCY MEDICINE | Admitting: INTERNAL MEDICINE
Payer: MEDICARE

## 2022-02-02 DIAGNOSIS — R07.9 CHEST PAIN: ICD-10-CM

## 2022-02-02 DIAGNOSIS — N18.6 ESRD (END STAGE RENAL DISEASE): Chronic | ICD-10-CM

## 2022-02-02 DIAGNOSIS — Z91.199 NON-COMPLIANCE: ICD-10-CM

## 2022-02-02 DIAGNOSIS — R55 SYNCOPE, UNSPECIFIED SYNCOPE TYPE: Primary | ICD-10-CM

## 2022-02-02 DIAGNOSIS — I48.91 ATRIAL FIBRILLATION WITH RVR: ICD-10-CM

## 2022-02-02 DIAGNOSIS — I20.0 UNSTABLE ANGINA PECTORIS: ICD-10-CM

## 2022-02-02 LAB
ALBUMIN SERPL BCP-MCNC: 3.8 G/DL (ref 3.5–5.2)
ALP SERPL-CCNC: 73 U/L (ref 55–135)
ALT SERPL W/O P-5'-P-CCNC: 26 U/L (ref 10–44)
ANION GAP SERPL CALC-SCNC: 16 MMOL/L (ref 8–16)
APTT PPP: 30 SEC (ref 23.3–35.1)
AST SERPL-CCNC: 21 U/L (ref 10–40)
BACTERIA #/AREA URNS HPF: NEGATIVE /HPF
BASOPHILS # BLD AUTO: 0.04 K/UL (ref 0–0.2)
BASOPHILS NFR BLD: 0.6 % (ref 0–1.9)
BILIRUB SERPL-MCNC: 0.7 MG/DL (ref 0.1–1)
BILIRUB UR QL STRIP: NEGATIVE
BNP SERPL-MCNC: 375 PG/ML (ref 0–99)
BUN SERPL-MCNC: 16 MG/DL (ref 8–23)
CALCIUM SERPL-MCNC: 9.1 MG/DL (ref 8.7–10.5)
CHLORIDE SERPL-SCNC: 91 MMOL/L (ref 95–110)
CK MB SERPL-MCNC: 2 NG/ML (ref 0.1–6.5)
CK MB SERPL-MCNC: 2.3 NG/ML (ref 0.1–6.5)
CK MB SERPL-MCNC: 2.8 NG/ML (ref 0.1–6.5)
CK SERPL-CCNC: 106 U/L (ref 20–180)
CLARITY UR: CLEAR
CO2 SERPL-SCNC: 31 MMOL/L (ref 23–29)
COLOR UR: YELLOW
CREAT SERPL-MCNC: 4.1 MG/DL (ref 0.5–1.4)
DIFFERENTIAL METHOD: ABNORMAL
EOSINOPHIL # BLD AUTO: 0.7 K/UL (ref 0–0.5)
EOSINOPHIL NFR BLD: 10.2 % (ref 0–8)
ERYTHROCYTE [DISTWIDTH] IN BLOOD BY AUTOMATED COUNT: 13.2 % (ref 11.5–14.5)
EST. GFR  (AFRICAN AMERICAN): 11.1 ML/MIN/1.73 M^2
EST. GFR  (NON AFRICAN AMERICAN): 9.6 ML/MIN/1.73 M^2
GLUCOSE SERPL-MCNC: 89 MG/DL (ref 70–110)
GLUCOSE UR QL STRIP: NEGATIVE
HCT VFR BLD AUTO: 38.4 % (ref 37–48.5)
HGB BLD-MCNC: 11.8 G/DL (ref 12–16)
HGB UR QL STRIP: NEGATIVE
HYALINE CASTS #/AREA URNS LPF: 3 /LPF
IMM GRANULOCYTES # BLD AUTO: 0.02 K/UL (ref 0–0.04)
IMM GRANULOCYTES NFR BLD AUTO: 0.3 % (ref 0–0.5)
INR PPP: 1.6
KETONES UR QL STRIP: NEGATIVE
LEUKOCYTE ESTERASE UR QL STRIP: NEGATIVE
LYMPHOCYTES # BLD AUTO: 2.5 K/UL (ref 1–4.8)
LYMPHOCYTES NFR BLD: 35.3 % (ref 18–48)
MAGNESIUM SERPL-MCNC: 2.1 MG/DL (ref 1.6–2.6)
MCH RBC QN AUTO: 29.7 PG (ref 27–31)
MCHC RBC AUTO-ENTMCNC: 30.7 G/DL (ref 32–36)
MCV RBC AUTO: 97 FL (ref 82–98)
MICROSCOPIC COMMENT: ABNORMAL
MONOCYTES # BLD AUTO: 1 K/UL (ref 0.3–1)
MONOCYTES NFR BLD: 13.7 % (ref 4–15)
NEUTROPHILS # BLD AUTO: 2.9 K/UL (ref 1.8–7.7)
NEUTROPHILS NFR BLD: 39.9 % (ref 38–73)
NITRITE UR QL STRIP: NEGATIVE
NRBC BLD-RTO: 0 /100 WBC
PH UR STRIP: >8 [PH] (ref 5–8)
PLATELET # BLD AUTO: 267 K/UL (ref 150–450)
PMV BLD AUTO: 9.4 FL (ref 9.2–12.9)
POTASSIUM SERPL-SCNC: 3.4 MMOL/L (ref 3.5–5.1)
PROT SERPL-MCNC: 7.6 G/DL (ref 6–8.4)
PROT UR QL STRIP: ABNORMAL
PROTHROMBIN TIME: 17.9 SEC (ref 11.4–13.7)
RBC # BLD AUTO: 3.97 M/UL (ref 4–5.4)
RBC #/AREA URNS HPF: 1 /HPF (ref 0–4)
SARS-COV-2 RDRP RESP QL NAA+PROBE: NEGATIVE
SODIUM SERPL-SCNC: 138 MMOL/L (ref 136–145)
SP GR UR STRIP: 1 (ref 1–1.03)
SQUAMOUS #/AREA URNS HPF: 5 /HPF
TROPONIN I SERPL DL<=0.01 NG/ML-MCNC: 0.04 NG/ML
TROPONIN I SERPL DL<=0.01 NG/ML-MCNC: 0.07 NG/ML
TROPONIN I SERPL DL<=0.01 NG/ML-MCNC: 0.07 NG/ML
URN SPEC COLLECT METH UR: ABNORMAL
UROBILINOGEN UR STRIP-ACNC: NEGATIVE EU/DL
WBC # BLD AUTO: 7.17 K/UL (ref 3.9–12.7)
WBC #/AREA URNS HPF: 1 /HPF (ref 0–5)

## 2022-02-02 PROCEDURE — 25000003 PHARM REV CODE 250: Performed by: INTERNAL MEDICINE

## 2022-02-02 PROCEDURE — 93005 ELECTROCARDIOGRAM TRACING: CPT | Performed by: INTERNAL MEDICINE

## 2022-02-02 PROCEDURE — 85610 PROTHROMBIN TIME: CPT | Performed by: EMERGENCY MEDICINE

## 2022-02-02 PROCEDURE — 83880 ASSAY OF NATRIURETIC PEPTIDE: CPT | Performed by: EMERGENCY MEDICINE

## 2022-02-02 PROCEDURE — 82553 CREATINE MB FRACTION: CPT | Mod: 91 | Performed by: INTERNAL MEDICINE

## 2022-02-02 PROCEDURE — U0002 COVID-19 LAB TEST NON-CDC: HCPCS | Performed by: EMERGENCY MEDICINE

## 2022-02-02 PROCEDURE — 94761 N-INVAS EAR/PLS OXIMETRY MLT: CPT

## 2022-02-02 PROCEDURE — 21000000 HC CCU ICU ROOM CHARGE

## 2022-02-02 PROCEDURE — 84484 ASSAY OF TROPONIN QUANT: CPT | Performed by: EMERGENCY MEDICINE

## 2022-02-02 PROCEDURE — 85730 THROMBOPLASTIN TIME PARTIAL: CPT | Performed by: EMERGENCY MEDICINE

## 2022-02-02 PROCEDURE — 85025 COMPLETE CBC W/AUTO DIFF WBC: CPT | Performed by: EMERGENCY MEDICINE

## 2022-02-02 PROCEDURE — 81001 URINALYSIS AUTO W/SCOPE: CPT | Performed by: EMERGENCY MEDICINE

## 2022-02-02 PROCEDURE — 63600175 PHARM REV CODE 636 W HCPCS

## 2022-02-02 PROCEDURE — 80053 COMPREHEN METABOLIC PANEL: CPT | Performed by: EMERGENCY MEDICINE

## 2022-02-02 PROCEDURE — 82553 CREATINE MB FRACTION: CPT | Performed by: EMERGENCY MEDICINE

## 2022-02-02 PROCEDURE — 84484 ASSAY OF TROPONIN QUANT: CPT | Mod: 91 | Performed by: INTERNAL MEDICINE

## 2022-02-02 PROCEDURE — 93010 ELECTROCARDIOGRAM REPORT: CPT | Mod: 76,,, | Performed by: INTERNAL MEDICINE

## 2022-02-02 PROCEDURE — 25000003 PHARM REV CODE 250: Performed by: EMERGENCY MEDICINE

## 2022-02-02 PROCEDURE — 83735 ASSAY OF MAGNESIUM: CPT | Performed by: EMERGENCY MEDICINE

## 2022-02-02 PROCEDURE — 36415 COLL VENOUS BLD VENIPUNCTURE: CPT | Performed by: INTERNAL MEDICINE

## 2022-02-02 PROCEDURE — 96374 THER/PROPH/DIAG INJ IV PUSH: CPT

## 2022-02-02 PROCEDURE — 99900035 HC TECH TIME PER 15 MIN (STAT)

## 2022-02-02 PROCEDURE — 82550 ASSAY OF CK (CPK): CPT | Performed by: EMERGENCY MEDICINE

## 2022-02-02 PROCEDURE — 93010 EKG 12-LEAD: ICD-10-PCS | Mod: ,,, | Performed by: INTERNAL MEDICINE

## 2022-02-02 PROCEDURE — 96361 HYDRATE IV INFUSION ADD-ON: CPT

## 2022-02-02 PROCEDURE — 93010 ELECTROCARDIOGRAM REPORT: CPT | Mod: ,,, | Performed by: INTERNAL MEDICINE

## 2022-02-02 PROCEDURE — 99291 CRITICAL CARE FIRST HOUR: CPT

## 2022-02-02 RX ORDER — WARFARIN 3 MG/1
3 TABLET ORAL
Status: ON HOLD | COMMUNITY
End: 2023-07-21 | Stop reason: HOSPADM

## 2022-02-02 RX ORDER — WARFARIN 1 MG/1
3 TABLET ORAL
Status: DISCONTINUED | OUTPATIENT
Start: 2022-02-03 | End: 2022-02-03

## 2022-02-02 RX ORDER — CALCIUM ACETATE 667 MG/1
667 CAPSULE ORAL DAILY
Status: DISCONTINUED | OUTPATIENT
Start: 2022-02-02 | End: 2022-02-08 | Stop reason: HOSPADM

## 2022-02-02 RX ORDER — ATORVASTATIN CALCIUM 40 MG/1
40 TABLET, FILM COATED ORAL DAILY
Status: DISCONTINUED | OUTPATIENT
Start: 2022-02-02 | End: 2022-02-08 | Stop reason: HOSPADM

## 2022-02-02 RX ORDER — SODIUM CHLORIDE 0.9 % (FLUSH) 0.9 %
10 SYRINGE (ML) INJECTION
Status: DISCONTINUED | OUTPATIENT
Start: 2022-02-02 | End: 2022-02-08 | Stop reason: HOSPADM

## 2022-02-02 RX ORDER — AMLODIPINE BESYLATE 5 MG/1
5 TABLET ORAL DAILY
COMMUNITY
End: 2023-03-06

## 2022-02-02 RX ORDER — ACETAMINOPHEN 325 MG/1
650 TABLET ORAL EVERY 4 HOURS PRN
Status: DISCONTINUED | OUTPATIENT
Start: 2022-02-02 | End: 2022-02-08 | Stop reason: HOSPADM

## 2022-02-02 RX ORDER — ONDANSETRON 4 MG/1
8 TABLET, ORALLY DISINTEGRATING ORAL EVERY 6 HOURS PRN
Status: DISCONTINUED | OUTPATIENT
Start: 2022-02-02 | End: 2022-02-08 | Stop reason: HOSPADM

## 2022-02-02 RX ORDER — TALC
8 POWDER (GRAM) TOPICAL NIGHTLY PRN
Status: DISCONTINUED | OUTPATIENT
Start: 2022-02-02 | End: 2022-02-08 | Stop reason: HOSPADM

## 2022-02-02 RX ORDER — ONDANSETRON 2 MG/ML
4 INJECTION INTRAMUSCULAR; INTRAVENOUS EVERY 6 HOURS PRN
Status: DISCONTINUED | OUTPATIENT
Start: 2022-02-02 | End: 2022-02-08 | Stop reason: HOSPADM

## 2022-02-02 RX ORDER — WARFARIN 2 MG/1
2 TABLET ORAL
COMMUNITY
End: 2023-03-06

## 2022-02-02 RX ORDER — DOCUSATE SODIUM 100 MG/1
100 CAPSULE, LIQUID FILLED ORAL DAILY
Status: DISCONTINUED | OUTPATIENT
Start: 2022-02-03 | End: 2022-02-08 | Stop reason: HOSPADM

## 2022-02-02 RX ORDER — POLYETHYLENE GLYCOL 3350 17 G/17G
17 POWDER, FOR SOLUTION ORAL DAILY PRN
Status: DISCONTINUED | OUTPATIENT
Start: 2022-02-02 | End: 2022-02-08 | Stop reason: HOSPADM

## 2022-02-02 RX ORDER — ASPIRIN 325 MG
325 TABLET ORAL
Status: DISCONTINUED | OUTPATIENT
Start: 2022-02-02 | End: 2022-02-02

## 2022-02-02 RX ORDER — ASPIRIN 325 MG
325 TABLET ORAL DAILY
Status: DISCONTINUED | OUTPATIENT
Start: 2022-02-03 | End: 2022-02-08 | Stop reason: HOSPADM

## 2022-02-02 RX ORDER — LATANOPROST 50 UG/ML
1 SOLUTION/ DROPS OPHTHALMIC NIGHTLY
Status: DISCONTINUED | OUTPATIENT
Start: 2022-02-02 | End: 2022-02-08 | Stop reason: HOSPADM

## 2022-02-02 RX ORDER — POTASSIUM CHLORIDE 20 MEQ/1
20 TABLET, EXTENDED RELEASE ORAL ONCE
Status: COMPLETED | OUTPATIENT
Start: 2022-02-02 | End: 2022-02-02

## 2022-02-02 RX ORDER — ASPIRIN 325 MG
325 TABLET ORAL
Status: COMPLETED | OUTPATIENT
Start: 2022-02-02 | End: 2022-02-02

## 2022-02-02 RX ORDER — WARFARIN 1 MG/1
2 TABLET ORAL
Status: DISCONTINUED | OUTPATIENT
Start: 2022-02-02 | End: 2022-02-03

## 2022-02-02 RX ORDER — DILTIAZEM HYDROCHLORIDE 5 MG/ML
10 INJECTION INTRAVENOUS
Status: COMPLETED | OUTPATIENT
Start: 2022-02-02 | End: 2022-02-02

## 2022-02-02 RX ORDER — NITROGLYCERIN 0.4 MG/1
0.4 TABLET SUBLINGUAL EVERY 5 MIN PRN
Status: DISCONTINUED | OUTPATIENT
Start: 2022-02-02 | End: 2022-02-08 | Stop reason: HOSPADM

## 2022-02-02 RX ORDER — ONDANSETRON 2 MG/ML
INJECTION INTRAMUSCULAR; INTRAVENOUS
Status: COMPLETED
Start: 2022-02-02 | End: 2022-02-02

## 2022-02-02 RX ORDER — DORZOLAMIDE HYDROCHLORIDE AND TIMOLOL MALEATE 20; 5 MG/ML; MG/ML
1 SOLUTION/ DROPS OPHTHALMIC 2 TIMES DAILY
Status: DISCONTINUED | OUTPATIENT
Start: 2022-02-02 | End: 2022-02-08 | Stop reason: HOSPADM

## 2022-02-02 RX ADMIN — CALCIUM ACETATE 667 MG: 667 CAPSULE ORAL at 05:02

## 2022-02-02 RX ADMIN — DORZOLAMIDE HYDROCHLORIDE AND TIMOLOL MALEATE 1 DROP: 22.3; 6.8 SOLUTION/ DROPS OPHTHALMIC at 08:02

## 2022-02-02 RX ADMIN — ONDANSETRON 4 MG: 2 INJECTION INTRAMUSCULAR; INTRAVENOUS at 11:02

## 2022-02-02 RX ADMIN — DEXTROSE MONOHYDRATE 5 MG/HR: 50 INJECTION, SOLUTION INTRAVENOUS at 11:02

## 2022-02-02 RX ADMIN — LATANOPROST 1 DROP: 50 SOLUTION OPHTHALMIC at 08:02

## 2022-02-02 RX ADMIN — POTASSIUM CHLORIDE 20 MEQ: 20 TABLET, EXTENDED RELEASE ORAL at 02:02

## 2022-02-02 RX ADMIN — SODIUM CHLORIDE 250 ML: 9 INJECTION, SOLUTION INTRAVENOUS at 11:02

## 2022-02-02 RX ADMIN — WARFARIN SODIUM 2 MG: 1 TABLET ORAL at 05:02

## 2022-02-02 RX ADMIN — ASPIRIN 325 MG ORAL TABLET 325 MG: 325 PILL ORAL at 01:02

## 2022-02-02 RX ADMIN — DILTIAZEM HYDROCHLORIDE 10 MG: 5 INJECTION INTRAVENOUS at 11:02

## 2022-02-02 RX ADMIN — MELATONIN 9 MG: at 08:02

## 2022-02-02 RX ADMIN — ATORVASTATIN CALCIUM 40 MG: 40 TABLET, FILM COATED ORAL at 08:02

## 2022-02-02 NOTE — CONSULTS
Nephrology Consult Note        Patient Name: Tyra Isaac  MRN: [id number]    Patient Class: IP- Inpatient   Admission Date: 2/2/2022  Length of Stay: 0 days  Date of Service: 2/2/2022    Attending Physician: Briana Marley MD  Primary Care Provider: Kalpesh Goel MD    Reason for Consult: esrd/anemia/htn/shpt/chest pain/afrvr    SUBJECTIVE:     HPI: 81F with ESRD on HD via RUE AVF on MWF, paroxysmal atrial fibrillation, on Coumadin, chronic anemia, hypertension, hyperlipidemia, anxiety/depression, presenting with chest pain and Afib RVR from dialysis, completed dialysis today. Patient discharged 5 days ago after similar presentation, underwent stress test which was negative for reversible ischemia, Lopressor 25 b.i.d. added for Afib. Patient received sublingual nitroglycerin today, however pain did not resolve until heart rate controlled, overall pain lasted for 45 minutes, she denied any palpitation nausea short of breath.    Past Medical History:   Diagnosis Date    A-fib     Anxiety     Depression     Disorder of kidney and ureter     Encephalopathy acute 1/1/2018    End stage kidney disease 6/17/2017    Gout     Hyperlipidemia     Hypertension     Moderate episode of recurrent major depressive disorder 1/17/2018    Nephropathy hypertensive, stage 5 chronic kidney disease or end stage renal disease 6/17/2017    Obstructive pattern present on pulmonary function testing 7/28/2021    Shows moderate obstruction.    Osteopenia of multiple sites 3/9/2018    Based upon bone density measurements. Patient also has chronic kidney disease.    Stroke 11/2016     Past Surgical History:   Procedure Laterality Date    CARDIAC SURGERY      stents    WRIST SURGERY       Family History   Problem Relation Age of Onset    Heart disease Mother     Cancer Father      Social History     Tobacco Use    Smoking status: Never Smoker    Smokeless tobacco: Never Used   Substance Use Topics    Alcohol use: No     Drug use: No       Review of patient's allergies indicates:   Allergen Reactions    Cyclobenzaprine     Fish containing products Hives    Peanut     Tramadol Itching       Outpatient meds:  No current facility-administered medications on file prior to encounter.     Current Outpatient Medications on File Prior to Encounter   Medication Sig Dispense Refill    amLODIPine (NORVASC) 5 MG tablet Take 5 mg by mouth once daily.      atorvastatin (LIPITOR) 40 MG tablet Take 40 mg by mouth once daily.      b complex vitamins tablet Take 1 tablet by mouth once daily.      calcium acetate (PHOSLO) 667 mg capsule Take 667 mg by mouth Daily.  6    docusate sodium (COLACE) 100 MG capsule Take 100 mg by mouth once daily.       dorzolamide-timolol 2-0.5% (COSOPT) 22.3-6.8 mg/mL ophthalmic solution Place 1 drop into both eyes 2 (two) times daily.       latanoprost 0.005 % ophthalmic solution Place 1 drop into both eyes every evening.       lidocaine 5 % Crea Apply 1 application topically every Mon, Wed, Fri.       warfarin (COUMADIN) 2 MG tablet Take 2 mg by mouth every Mon, Wed, Fri.      warfarin (COUMADIN) 3 MG tablet Take 3 mg by mouth every Tuesday, Thursday, Saturday, Sunday.      metoprolol tartrate (LOPRESSOR) 25 MG tablet Take 1 tablet (25 mg total) by mouth 2 (two) times daily. 60 tablet 0    warfarin (COUMADIN) 1 MG tablet Take 1 tablet (1 mg total) by mouth Daily. Continue to take coumadin as you were prior to admission with intermittent INR checks and dose adjustments as needed 30 tablet 0       Scheduled meds:      Infusions:   dilTIAZem 5 mg/hr (02/02/22 1125)       PRN meds:      Review of Systems:  Review of Systems   Constitutional: Negative for chills, fever, malaise/fatigue and weight loss.   HENT: Negative for hearing loss and nosebleeds.    Eyes: Negative for blurred vision, double vision and photophobia.   Respiratory: Negative for cough, shortness of breath and wheezing.     Cardiovascular: Negative for chest pain, palpitations and leg swelling.   Gastrointestinal: Negative for abdominal pain, constipation, diarrhea, heartburn, nausea and vomiting.   Genitourinary: Negative for dysuria, frequency and urgency.   Musculoskeletal: Negative for falls, joint pain and myalgias.   Skin: Negative for itching and rash.   Neurological: Negative for dizziness, speech change, focal weakness, loss of consciousness and headaches.   Endo/Heme/Allergies: Does not bruise/bleed easily.   Psychiatric/Behavioral: Negative for depression and substance abuse. The patient is not nervous/anxious.      OBJECTIVE:     Vital Signs and IO (Last 24H):  Temp:  [98.1 °F (36.7 °C)]   Pulse:  []   Resp:  [16-38]   BP: (102-125)/(53-67)   SpO2:  [98 %-100 %]   No intake/output data recorded.    Wt Readings from Last 5 Encounters:   02/02/22 56.7 kg (125 lb)   01/26/22 55.6 kg (122 lb 9.2 oz)   10/11/21 55.8 kg (123 lb)   06/25/21 57 kg (125 lb 10.6 oz)   06/07/21 57.2 kg (126 lb)     Physical Exam:  Physical Exam  Constitutional:       Appearance: She is well-developed and well-nourished. She is not diaphoretic.   HENT:      Head: Normocephalic and atraumatic.      Mouth/Throat:      Mouth: Oropharynx is clear and moist.   Eyes:      General: No scleral icterus.     Extraocular Movements: EOM normal.      Pupils: Pupils are equal, round, and reactive to light.   Cardiovascular:      Rate and Rhythm: Normal rate. Rhythm irregular.   Pulmonary:      Effort: Pulmonary effort is normal. No respiratory distress.      Breath sounds: No stridor.   Abdominal:      General: There is no distension.      Palpations: Abdomen is soft.   Musculoskeletal:         General: No deformity or edema. Normal range of motion.      Cervical back: Neck supple.   Skin:     General: Skin is warm and dry.      Findings: No erythema or rash.   Neurological:      Mental Status: She is alert and oriented to person, place, and time.       Cranial Nerves: No cranial nerve deficit.   Psychiatric:         Mood and Affect: Mood and affect normal.         Behavior: Behavior normal.       Body mass index is 21.46 kg/m².    Laboratory:  Recent Labs   Lab 01/28/22 0515 01/29/22 0422 02/02/22  1117    141 138   K 4.8 4.6 3.4*    96 91*   CO2 26 33* 31*   BUN 53* 27* 16   CREATININE 8.4* 6.0* 4.1*   ESTGFRAFRICA 4.7* 7.0* 11.1*   EGFRNONAA 4.0* 6.1* 9.6*   GLU 75 88 89       Recent Labs   Lab 01/27/22  0508 01/27/22  0508 01/28/22 0515 01/29/22 0422 02/02/22  1117   CALCIUM 8.6*   < > 8.7 8.7 9.1   ALBUMIN  --   --   --   --  3.8   PHOS 3.6  --   --   --   --    MG 2.1   < > 2.3 2.1 2.1    < > = values in this interval not displayed.             No results for input(s): POCTGLUCOSE in the last 168 hours.    Recent Labs   Lab 01/26/22  1525   Hemoglobin A1C 5.3       Recent Labs   Lab 01/28/22 0515 01/28/22 0515 01/29/22 0422 02/02/22  1117   WBC 5.83  --  5.77 7.17   HGB 10.1*  --  11.0* 11.8*   HCT 32.8*  --  35.9* 38.4     --  230 267   MCV 99*  --  100* 97   MCHC 30.8*  --  30.6* 30.7*   MONO 13.2  0.8   < > 13.5  0.8 13.7  1.0    < > = values in this interval not displayed.       Recent Labs   Lab 02/02/22  1117   BILITOT 0.7   PROT 7.6   ALBUMIN 3.8   ALKPHOS 73   ALT 26   AST 21             Invalid input(s): LEUCOCYTESUR    Recent Labs   Lab 12/13/19  1243   POC PH 7.51 H   POC PCO2 43   POC PO2 90   POC SATURATED O2 97.9       Microbiology Results (last 7 days)     ** No results found for the last 168 hours. **        ASSESSMENT/PLAN:     Active Hospital Problems    Diagnosis  POA    *Atrial fibrillation with RVR [I48.91]  Unknown      Resolved Hospital Problems   No resolved problems to display.     ESRD on HD MWF via AVF  Continue current dialysis prescription.  Next HD per schedule or PRN.  Renal diet - low K, low phos.  No IVs or BP checks on access and/or non-dominant arm.    Anemia of CKD  Hgb and HCT are acceptable.  Monitor.  Will provide BRAYAN and/or IV iron PRN.    MBD / Secondary HPT  Ca, phos, PTH and vitamin D levels are acceptable.   Phos binders, vitamin D analogues and calcimimetics as needed.    HTN  BP seem controlled.   Tolerate asymptomatic HTN up to -160.  Continue home meds.  Low sodium diet.    AF with RVR  Chest pain  Awaiting cards input.    Thank you for allowing us to participate in the care of your patient!   We will follow the patient and provide recommendations as needed.    Patient care time was spent personally by me on the following activities:   · Obtaining a history.  · Examination of patient.  · Providing medical care at the patients bedside.  · Developing a treatment plan with patient or surrogate and bedside caregivers.  · Ordering and reviewing laboratory studies, radiographic studies, pulse oximetry.  · Ordering and performing treatments and interventions.  · Evaluation of patient's response to treatment.  · Discussions with consultants while on the unit and immediately available to the patient.  · Re-evaluation of the patient's condition.  · Documentation in the medical record.     Raleigh Yee MD    [facility]  [address] [place] [address]  [place] [address]    [phone] - tel  [phone] - fax    2/2/2022

## 2022-02-02 NOTE — Clinical Note
The catheter was inserted into the ostium   left main. An angiography was performed of the left coronary arteries. Multiple views were taken. The angiography was performed via power injection. The injected amount was 10 mL contrast at 5 mL/s. The PSI from the power injection was 450.
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
## 2022-02-02 NOTE — ED PROVIDER NOTES
Encounter Date: 2/2/2022       History     Chief Complaint   Patient presents with    Chest Pain     Afib RVR from dialysis, completed dialysis today     Patient with history of end-stage renal disease.  Patient recently admitted with atrial fibrillation with rapid ventricular rate.  Patient had elevated troponin at that time.  Patient currently anticoagulated with warfarin.  Patient sternal chest pain on the way to dialysis today.  Patient did finish dialysis then started with more chest pain with palpitations.  Patient's pulse rate was noted to be 160 after she finished dialysis.  Unsure what her pulse rate was upon presentation.  EMS activated.  With EMS patient was noted to be in AFib with RVR.  No relieving factors in route.  Symptoms continued emergency room.  There is no fever chills.  No pleurisy hemoptysis.        Review of patient's allergies indicates:   Allergen Reactions    Cyclobenzaprine     Fish containing products Hives    Peanut     Tramadol Itching     Past Medical History:   Diagnosis Date    A-fib     Anxiety     Depression     Disorder of kidney and ureter     Encephalopathy acute 1/1/2018    End stage kidney disease 6/17/2017    Gout     Hyperlipidemia     Hypertension     Moderate episode of recurrent major depressive disorder 1/17/2018    Nephropathy hypertensive, stage 5 chronic kidney disease or end stage renal disease 6/17/2017    Obstructive pattern present on pulmonary function testing 7/28/2021    Shows moderate obstruction.    Osteopenia of multiple sites 3/9/2018    Based upon bone density measurements. Patient also has chronic kidney disease.    Stroke 11/2016     Past Surgical History:   Procedure Laterality Date    CARDIAC SURGERY      stents    WRIST SURGERY       Family History   Problem Relation Age of Onset    Heart disease Mother     Cancer Father      Social History     Tobacco Use    Smoking status: Never Smoker    Smokeless tobacco: Never Used    Substance Use Topics    Alcohol use: No    Drug use: No     Review of Systems   Constitutional: Negative for chills and fever.   HENT: Negative for congestion.    Eyes: Negative for visual disturbance.   Respiratory: Negative for cough.    Cardiovascular: Positive for chest pain and palpitations.   Gastrointestinal: Negative for abdominal pain and vomiting.   Genitourinary: Negative for dysuria.   Musculoskeletal: Negative for joint swelling.   Neurological: Negative for headaches.   Psychiatric/Behavioral: Negative for confusion.       Physical Exam     Initial Vitals [02/02/22 1055]   BP Pulse Resp Temp SpO2   102/67 (!) 211 18 98.1 °F (36.7 °C) 98 %      MAP       --         Physical Exam    Nursing note and vitals reviewed.  Constitutional: She is not diaphoretic. No distress.   HENT:   Head: Normocephalic and atraumatic.   Eyes: Conjunctivae are normal.   Neck:   Normal range of motion.  Cardiovascular:   Irregular, tachycardic rhythm and rate   Pulmonary/Chest: Breath sounds normal.   Abdominal: Abdomen is soft. There is no abdominal tenderness.   Musculoskeletal:         General: Normal range of motion.      Cervical back: Normal range of motion.     Neurological: She is alert.   No gross deficits   Skin: No rash noted.   Psychiatric: She has a normal mood and affect.         ED Course   Critical Care    Date/Time: 2/2/2022 11:41 AM  Performed by: Amanuel Smith MD  Authorized by: Amanuel Smith MD   Direct patient critical care time: 15 minutes  Additional history critical care time: 5 minutes  Ordering / reviewing critical care time: 5 minutes  Documentation critical care time: 5 minutes  Consulting other physicians critical care time: 5 minutes  Total critical care time (exclusive of procedural time) : 35 minutes        Labs Reviewed   PROTIME-INR - Abnormal; Notable for the following components:       Result Value    PT 17.9 (*)     All other components within normal limits   CBC W/ AUTO  DIFFERENTIAL - Abnormal; Notable for the following components:    RBC 3.97 (*)     Hemoglobin 11.8 (*)     MCHC 30.7 (*)     Eos # 0.7 (*)     Eosinophil % 10.2 (*)     All other components within normal limits   COMPREHENSIVE METABOLIC PANEL - Abnormal; Notable for the following components:    Potassium 3.4 (*)     Chloride 91 (*)     CO2 31 (*)     Creatinine 4.1 (*)     eGFR if  11.1 (*)     eGFR if non  9.6 (*)     All other components within normal limits   TROPONIN I - Abnormal; Notable for the following components:    Troponin I 0.045 (*)     All other components within normal limits   APTT   CK   MAGNESIUM   SARS-COV-2 RNA AMPLIFICATION, QUAL   B-TYPE NATRIURETIC PEPTIDE   CK-MB   URINALYSIS, REFLEX TO URINE CULTURE          Imaging Results          X-Ray Chest AP Portable (In process)                  Medications   aspirin tablet 325 mg (has no administration in time range)   diltiaZEM 100 mg in dextrose 5% 100 mL IVPB (ready to mix system) (titrating) (5 mg/hr Intravenous New Bag 2/2/22 1125)   sodium chloride 0.9% bolus 250 mL (has no administration in time range)   diltiaZEM injection 10 mg (10 mg Intravenous Given 2/2/22 1114)   ondansetron 4 mg/2 mL injection (4 mg  Given 2/2/22 1119)     Medical Decision Making:   History:   Old Medical Records: I decided to obtain old medical records.  Clinical Tests:   Lab Tests: Reviewed  Radiological Study: Reviewed  Medical Tests: Reviewed  ED Management:  Patient presents with chest pain associated with AFib with RVR.  Patient's pulse rate was up to 200. Patient had apparent syncopal episode of unclear etiology.  Patient had emesis with syncope.  Patient mental status is now improved.  Repeat neurologic exam with no focal deficits.  Rate is improving with Cardizem.  Hospitalist consulted for admission.                        Clinical Impression:   Final diagnoses:  [R07.9] Chest pain  [R55] Syncope, unspecified syncope type  (Primary)  [I48.91] Atrial fibrillation with RVR          ED Disposition Condition    Admit               Amanuel Smith MD  02/02/22 5395

## 2022-02-02 NOTE — Clinical Note
md at bedside. Notified that inr is 1.8. Elects to reschedule case for a later time when inr is more therapeutic for procedure. Pt made aware.

## 2022-02-02 NOTE — H&P
[facility] Medicine History & Physical Examination   Patient Name: Tyra Isaac  MRN: [id number]  Patient Class: IP- Inpatient   Admission Date: 2/2/2022 10:54 AM  Attending Physician: Briana Marley MD  Primary Care Provider: Kalpesh Goel MD  Face-to-Face encounter date: 02/02/2022  Code Status: Full  Chief Complaint: Chest Pain (Afib RVR from dialysis, completed dialysis today)      Covid test negative       Patient information was obtained from patient, past medical records and ER records and ED physician sign out.   HISTORY OF PRESENT ILLNESS:   Tyra Isaac is a 81 y.o. female who has PMH of  ESRD on HD via are right upper extremity AVF MWF, paroxysmal atrial fibrillation, on Coumadin, chronic anemia, hypertension, hyperlipidemia, anxiety/depression, presenting with chest pain   The patient presented to [facility] on 2/2/2022 with a primary complaint of Chest Pain (Afib RVR from dialysis, completed dialysis today)  .   Patient got recently discharged 5 days ago with similar presentation, underwent stress test which was negative for reversible ischemia, Lopressor 25 b.i.d. added for Afib  Had successful dialysis session on Monday, for mild chest discomfort today prior to dialysis this morning, which got worsened after 3hr dialysis, with 8/10 no radiating precordial chest pain associated with lightheadedness, patient also noted to have elevated blood pressure with increased heart rate, unsure about the numbers.  Patient received sublingual nitroglycerin.  However pain did not resolve until heart rate controlled, overall pain lasted for 45 minutes, she denied any palpitation nausea short of breath.    In ED prior to initiation of Cardizem drip, patient had episode of syncope with NBNB emesis HR was 200, normotensive,CT head and CXR were unremarkable.  Labs were unremarkable at baseline with mild hyperkalemia 3.4, INR 1.6, troponin 0.045 better than last wk ,  EKG afib rvr  with st depression 2/2 demand ischemia  During my evaluation patient denies any chest pain, remains on AFib rate controlled on Cardizem drip    REVIEW OF SYSTEMS:   10 Point Review of System was performed and was found to be negative except for that mentioned already in the HPI above.     PAST MEDICAL HISTORY:     Past Medical History:   Diagnosis Date    A-fib     Anxiety     Depression     Disorder of kidney and ureter     Encephalopathy acute 1/1/2018    End stage kidney disease 6/17/2017    Gout     Hyperlipidemia     Hypertension     Moderate episode of recurrent major depressive disorder 1/17/2018    Nephropathy hypertensive, stage 5 chronic kidney disease or end stage renal disease 6/17/2017    Obstructive pattern present on pulmonary function testing 7/28/2021    Shows moderate obstruction.    Osteopenia of multiple sites 3/9/2018    Based upon bone density measurements. Patient also has chronic kidney disease.    Stroke 11/2016       PAST SURGICAL HISTORY:     Past Surgical History:   Procedure Laterality Date    CARDIAC SURGERY      stents    WRIST SURGERY         ALLERGIES:   Cyclobenzaprine, Fish containing products, Peanut, and Tramadol    FAMILY HISTORY:     Family History   Problem Relation Age of Onset    Heart disease Mother     Cancer Father        SOCIAL HISTORY:     Social History     Tobacco Use    Smoking status: Never Smoker    Smokeless tobacco: Never Used   Substance Use Topics    Alcohol use: No        Social History     Substance and Sexual Activity   Sexual Activity Not Currently        HOME MEDICATIONS:     Prior to Admission medications    Medication Sig Start Date End Date Taking? Authorizing Provider   amLODIPine (NORVASC) 5 MG tablet Take 5 mg by mouth once daily.   Yes Historical Provider   atorvastatin (LIPITOR) 40 MG tablet Take 40 mg by mouth once daily.   Yes Historical Provider   b complex vitamins tablet Take 1 tablet by mouth once  "daily.   Yes Historical Provider   calcium acetate (PHOSLO) 667 mg capsule Take 667 mg by mouth Daily. 7/22/19  Yes Historical Provider   docusate sodium (COLACE) 100 MG capsule Take 100 mg by mouth once daily.    Yes Historical Provider   dorzolamide-timolol 2-0.5% (COSOPT) 22.3-6.8 mg/mL ophthalmic solution Place 1 drop into both eyes 2 (two) times daily.  2/6/18  Yes Historical Provider   latanoprost 0.005 % ophthalmic solution Place 1 drop into both eyes every evening.  1/15/18  Yes Historical Provider   lidocaine 5 % Crea Apply 1 application topically every Mon, Wed, Fri.    Yes Historical Provider   warfarin (COUMADIN) 2 MG tablet Take 2 mg by mouth every Mon, Wed, Fri.   Yes Historical Provider   warfarin (COUMADIN) 3 MG tablet Take 3 mg by mouth every Tuesday, Thursday, Saturday, Sunday.   Yes Historical Provider   metoprolol tartrate (LOPRESSOR) 25 MG tablet Take 1 tablet (25 mg total) by mouth 2 (two) times daily. 1/28/22 2/27/22  Melissa Wu MD   warfarin (COUMADIN) 1 MG tablet Take 1 tablet (1 mg total) by mouth Daily. Continue to take coumadin as you were prior to admission with intermittent INR checks and dose adjustments as needed 1/28/22 2/27/22  Melissa uW MD         PHYSICAL EXAM:   /67 (BP Location: Left arm, Patient Position: Sitting)   Pulse (!) 211   Temp 98.1 °F (36.7 °C) (Oral)   Resp 18   Ht 5' 4" (1.626 m)   Wt 56.7 kg (125 lb)   SpO2 98%   Breastfeeding No   BMI 21.46 kg/m²   Vitals Reviewed  General appearance: Well-developed, well-nourished female in no apparent distress on nc.  Skin: No Rash.   Neuro: Motor and sensory exams grossly intact. Good tone. Power in all 4 extremities 5/5.   HENT: Atraumatic head. Moist mucous membranes of oral cavity.  Eyes: Normal extraocular movements.   Neck: Supple. No evidence of lymphadenopathy. No thyroidomegaly.  Lungs: Clear to auscultation bilaterally. No wheezing present.   Heart:  Irregular rate and rhythm. S1 and S2 " present with no murmurs/gallop/rub. No pedal edema. No JVD present.   Abdomen: Soft, non-distended, non-tender. No rebound tenderness/guarding. No masses or organomegaly. Bowel sounds are normal. Bladder is not palpable.  :no diaz ,no CVA tenderness  Extremities: No cyanosis, clubbing, or edema.  Right upper extremity AVF  Psych/mental status: Alert and oriented. Cooperative. Responds appropriately to questions.     EMERGENCY DEPARTMENT LABS AND IMAGING:     Labs Reviewed   PROTIME-INR - Abnormal; Notable for the following components:       Result Value    PT 17.9 (*)     All other components within normal limits   CBC W/ AUTO DIFFERENTIAL - Abnormal; Notable for the following components:    RBC 3.97 (*)     Hemoglobin 11.8 (*)     MCHC 30.7 (*)     Eos # 0.7 (*)     Eosinophil % 10.2 (*)     All other components within normal limits   COMPREHENSIVE METABOLIC PANEL - Abnormal; Notable for the following components:    Potassium 3.4 (*)     Chloride 91 (*)     CO2 31 (*)     Creatinine 4.1 (*)     eGFR if  11.1 (*)     eGFR if non  9.6 (*)     All other components within normal limits   TROPONIN I - Abnormal; Notable for the following components:    Troponin I 0.045 (*)     All other components within normal limits   B-TYPE NATRIURETIC PEPTIDE - Abnormal; Notable for the following components:     (*)     All other components within normal limits   APTT   CK-MB   CK   MAGNESIUM   SARS-COV-2 RNA AMPLIFICATION, QUAL   URINALYSIS, REFLEX TO URINE CULTURE       CT Head Without Contrast   Final Result      X-Ray Chest AP Portable   Final Result          ASSESSMENT & PLAN:   Tyra Isaac is a 81 y.o. female admitted for      # AFib RVR with chest pain and syncope aft HD  # hypokalemia  # sub therapeutic INR due to possible  noncompliance with warfarin    Chronic medical problems  # ESRD on hemodialysis  # hypertension  # HLD  # chronic anemia  # SHPT    Plan:  Admit to  CCU  C/W Cardizem drip  Trend CE , repeat EKG after controlling HR  Consult cardiology for possible LHC /amiodarone and Nephrology  Will hold  beta-blocker nw  Replace pottasium  20meeq for nw  Restart home medications    DVT Prophylaxis: will be placed on warfarin  for DVT prophylaxis and will be advised to be as mobile as possible and sit in a chair as tolerated.     INPATIENT LIST OF MEDICATIONS     Current Facility-Administered Medications:     aspirin tablet 325 mg, 325 mg, Oral, ED 1 Time, Amanuel Smith MD    diltiaZEM 100 mg in dextrose 5% 100 mL IVPB (ready to mix system) (titrating), 0-15 mg/hr, Intravenous, Continuous, Amanuel Smith MD, Last Rate: 5 mL/hr at 02/02/22 1125, 5 mg/hr at 02/02/22 1125    Current Outpatient Medications:     amLODIPine (NORVASC) 5 MG tablet, Take 5 mg by mouth once daily., Disp: , Rfl:     atorvastatin (LIPITOR) 40 MG tablet, Take 40 mg by mouth once daily., Disp: , Rfl:     b complex vitamins tablet, Take 1 tablet by mouth once daily., Disp: , Rfl:     calcium acetate (PHOSLO) 667 mg capsule, Take 667 mg by mouth Daily., Disp: , Rfl: 6    docusate sodium (COLACE) 100 MG capsule, Take 100 mg by mouth once daily. , Disp: , Rfl:     dorzolamide-timolol 2-0.5% (COSOPT) 22.3-6.8 mg/mL ophthalmic solution, Place 1 drop into both eyes 2 (two) times daily. , Disp: , Rfl:     latanoprost 0.005 % ophthalmic solution, Place 1 drop into both eyes every evening. , Disp: , Rfl:     lidocaine 5 % Crea, Apply 1 application topically every Mon, Wed, Fri. , Disp: , Rfl:     warfarin (COUMADIN) 2 MG tablet, Take 2 mg by mouth every Mon, Wed, Fri., Disp: , Rfl:     warfarin (COUMADIN) 3 MG tablet, Take 3 mg by mouth every Tuesday, Thursday, Saturday, Sunday., Disp: , Rfl:     metoprolol tartrate (LOPRESSOR) 25 MG tablet, Take 1 tablet (25 mg total) by mouth 2 (two) times daily., Disp: 60 tablet, Rfl: 0    warfarin (COUMADIN) 1 MG tablet, Take 1 tablet (1 mg total) by mouth  Daily. Continue to take coumadin as you were prior to admission with intermittent INR checks and dose adjustments as needed, Disp: 30 tablet, Rfl: 0      Scheduled Meds:   aspirin  325 mg Oral ED 1 Time     Continuous Infusions:   dilTIAZem 5 mg/hr (02/02/22 1125)     PRN Meds:.      Briana Marley  [facility] Hospitalist  02/02/2022

## 2022-02-02 NOTE — Clinical Note
70 ml of contrast were injected throughout the case. 80 mL of contrast was the total wasted during the case. 150 mL was the total amount used during the case.

## 2022-02-02 NOTE — Clinical Note
The catheter was inserted into the left ventricle. An angiography was performed of the LV. The angiography was performed via power injection. The injected amount was 24 mL contrast at 12 mL/s. The PSI from the power injection was 500.

## 2022-02-03 PROBLEM — I48.91 ATRIAL FIBRILLATION WITH RVR: Status: ACTIVE | Noted: 2022-02-03

## 2022-02-03 PROBLEM — R07.9 CHEST PAIN: Status: ACTIVE | Noted: 2022-02-03

## 2022-02-03 PROBLEM — I48.91 ATRIAL FIBRILLATION WITH RVR: Status: RESOLVED | Noted: 2021-03-03 | Resolved: 2022-02-03

## 2022-02-03 PROBLEM — Z91.199 NON-COMPLIANCE: Status: ACTIVE | Noted: 2022-02-03

## 2022-02-03 LAB
ALBUMIN SERPL BCP-MCNC: 3.1 G/DL (ref 3.5–5.2)
ALP SERPL-CCNC: 59 U/L (ref 55–135)
ALT SERPL W/O P-5'-P-CCNC: 22 U/L (ref 10–44)
ANION GAP SERPL CALC-SCNC: 9 MMOL/L (ref 8–16)
APTT PPP: 31.2 SEC (ref 23.3–35.1)
AST SERPL-CCNC: 18 U/L (ref 10–40)
BASOPHILS # BLD AUTO: 0.02 K/UL (ref 0–0.2)
BASOPHILS NFR BLD: 0.3 % (ref 0–1.9)
BILIRUB SERPL-MCNC: 0.9 MG/DL (ref 0.1–1)
BUN SERPL-MCNC: 26 MG/DL (ref 8–23)
CALCIUM SERPL-MCNC: 8.4 MG/DL (ref 8.7–10.5)
CHLORIDE SERPL-SCNC: 94 MMOL/L (ref 95–110)
CO2 SERPL-SCNC: 31 MMOL/L (ref 23–29)
CREAT SERPL-MCNC: 5.9 MG/DL (ref 0.5–1.4)
DIFFERENTIAL METHOD: ABNORMAL
EOSINOPHIL # BLD AUTO: 0.6 K/UL (ref 0–0.5)
EOSINOPHIL NFR BLD: 8 % (ref 0–8)
ERYTHROCYTE [DISTWIDTH] IN BLOOD BY AUTOMATED COUNT: 13.2 % (ref 11.5–14.5)
EST. GFR  (AFRICAN AMERICAN): 7.1 ML/MIN/1.73 M^2
EST. GFR  (NON AFRICAN AMERICAN): 6.2 ML/MIN/1.73 M^2
GLUCOSE SERPL-MCNC: 84 MG/DL (ref 70–110)
HCT VFR BLD AUTO: 33.3 % (ref 37–48.5)
HGB BLD-MCNC: 10.2 G/DL (ref 12–16)
IMM GRANULOCYTES # BLD AUTO: 0.02 K/UL (ref 0–0.04)
IMM GRANULOCYTES NFR BLD AUTO: 0.3 % (ref 0–0.5)
INR PPP: 1.8
LYMPHOCYTES # BLD AUTO: 1.3 K/UL (ref 1–4.8)
LYMPHOCYTES NFR BLD: 16.4 % (ref 18–48)
MAGNESIUM SERPL-MCNC: 2 MG/DL (ref 1.6–2.6)
MCH RBC QN AUTO: 30.3 PG (ref 27–31)
MCHC RBC AUTO-ENTMCNC: 30.6 G/DL (ref 32–36)
MCV RBC AUTO: 99 FL (ref 82–98)
MONOCYTES # BLD AUTO: 1 K/UL (ref 0.3–1)
MONOCYTES NFR BLD: 13.6 % (ref 4–15)
NEUTROPHILS # BLD AUTO: 4.7 K/UL (ref 1.8–7.7)
NEUTROPHILS NFR BLD: 61.4 % (ref 38–73)
NRBC BLD-RTO: 0 /100 WBC
PHOSPHATE SERPL-MCNC: 3.6 MG/DL (ref 2.7–4.5)
PLATELET # BLD AUTO: 239 K/UL (ref 150–450)
PMV BLD AUTO: 9.7 FL (ref 9.2–12.9)
POTASSIUM SERPL-SCNC: 5.1 MMOL/L (ref 3.5–5.1)
PROT SERPL-MCNC: 6.2 G/DL (ref 6–8.4)
PROTHROMBIN TIME: 19.9 SEC (ref 11.4–13.7)
RBC # BLD AUTO: 3.37 M/UL (ref 4–5.4)
SODIUM SERPL-SCNC: 134 MMOL/L (ref 136–145)
WBC # BLD AUTO: 7.67 K/UL (ref 3.9–12.7)

## 2022-02-03 PROCEDURE — 85025 COMPLETE CBC W/AUTO DIFF WBC: CPT | Performed by: INTERNAL MEDICINE

## 2022-02-03 PROCEDURE — 21000000 HC CCU ICU ROOM CHARGE

## 2022-02-03 PROCEDURE — 99223 PR INITIAL HOSPITAL CARE,LEVL III: ICD-10-PCS | Mod: ,,, | Performed by: INTERNAL MEDICINE

## 2022-02-03 PROCEDURE — 99223 1ST HOSP IP/OBS HIGH 75: CPT | Mod: ,,, | Performed by: INTERNAL MEDICINE

## 2022-02-03 PROCEDURE — 93010 EKG 12-LEAD: ICD-10-PCS | Mod: ,,, | Performed by: SPECIALIST

## 2022-02-03 PROCEDURE — 99900035 HC TECH TIME PER 15 MIN (STAT)

## 2022-02-03 PROCEDURE — 83735 ASSAY OF MAGNESIUM: CPT | Performed by: INTERNAL MEDICINE

## 2022-02-03 PROCEDURE — 94761 N-INVAS EAR/PLS OXIMETRY MLT: CPT

## 2022-02-03 PROCEDURE — 25000003 PHARM REV CODE 250: Performed by: INTERNAL MEDICINE

## 2022-02-03 PROCEDURE — 63600175 PHARM REV CODE 636 W HCPCS: Performed by: INTERNAL MEDICINE

## 2022-02-03 PROCEDURE — 93010 ELECTROCARDIOGRAM REPORT: CPT | Mod: ,,, | Performed by: SPECIALIST

## 2022-02-03 PROCEDURE — 93005 ELECTROCARDIOGRAM TRACING: CPT | Performed by: SPECIALIST

## 2022-02-03 PROCEDURE — 84100 ASSAY OF PHOSPHORUS: CPT | Performed by: INTERNAL MEDICINE

## 2022-02-03 PROCEDURE — 85610 PROTHROMBIN TIME: CPT | Performed by: INTERNAL MEDICINE

## 2022-02-03 PROCEDURE — 85730 THROMBOPLASTIN TIME PARTIAL: CPT | Performed by: INTERNAL MEDICINE

## 2022-02-03 PROCEDURE — 36415 COLL VENOUS BLD VENIPUNCTURE: CPT | Performed by: INTERNAL MEDICINE

## 2022-02-03 PROCEDURE — 25000003 PHARM REV CODE 250: Performed by: EMERGENCY MEDICINE

## 2022-02-03 PROCEDURE — 80053 COMPREHEN METABOLIC PANEL: CPT | Performed by: INTERNAL MEDICINE

## 2022-02-03 RX ORDER — AMIODARONE HYDROCHLORIDE 200 MG/1
200 TABLET ORAL 3 TIMES DAILY
Status: DISCONTINUED | OUTPATIENT
Start: 2022-02-03 | End: 2022-02-08 | Stop reason: HOSPADM

## 2022-02-03 RX ORDER — ENOXAPARIN SODIUM 100 MG/ML
0.75 INJECTION SUBCUTANEOUS
Status: DISCONTINUED | OUTPATIENT
Start: 2022-02-03 | End: 2022-02-03

## 2022-02-03 RX ORDER — ENOXAPARIN SODIUM 100 MG/ML
30 INJECTION SUBCUTANEOUS
Status: DISCONTINUED | OUTPATIENT
Start: 2022-02-03 | End: 2022-02-04

## 2022-02-03 RX ADMIN — AMIODARONE HYDROCHLORIDE 200 MG: 200 TABLET ORAL at 03:02

## 2022-02-03 RX ADMIN — DEXTROSE MONOHYDRATE 5 MG/HR: 50 INJECTION, SOLUTION INTRAVENOUS at 02:02

## 2022-02-03 RX ADMIN — DORZOLAMIDE HYDROCHLORIDE AND TIMOLOL MALEATE 1 DROP: 22.3; 6.8 SOLUTION/ DROPS OPHTHALMIC at 09:02

## 2022-02-03 RX ADMIN — CALCIUM ACETATE 667 MG: 667 CAPSULE ORAL at 06:02

## 2022-02-03 RX ADMIN — LATANOPROST 1 DROP: 50 SOLUTION OPHTHALMIC at 09:02

## 2022-02-03 RX ADMIN — MELATONIN 9 MG: at 09:02

## 2022-02-03 RX ADMIN — ASPIRIN 325 MG ORAL TABLET 325 MG: 325 PILL ORAL at 11:02

## 2022-02-03 RX ADMIN — ENOXAPARIN SODIUM 30 MG: 30 INJECTION SUBCUTANEOUS at 03:02

## 2022-02-03 RX ADMIN — AMIODARONE HYDROCHLORIDE 200 MG: 200 TABLET ORAL at 09:02

## 2022-02-03 RX ADMIN — DOCUSATE SODIUM 100 MG: 100 CAPSULE, LIQUID FILLED ORAL at 11:02

## 2022-02-03 RX ADMIN — ATORVASTATIN CALCIUM 40 MG: 40 TABLET, FILM COATED ORAL at 09:02

## 2022-02-03 NOTE — CARE UPDATE
02/02/22 2033   Patient Assessment/Suction   Level of Consciousness (AVPU)   (resting quietly with nadn)   Respiratory Effort Normal;Unlabored   Expansion/Accessory Muscles/Retractions no use of accessory muscles   PRE-TX-O2   O2 Device (Oxygen Therapy) room air   Pulse Oximetry Type Continuous   $ Pulse Oximetry - Multiple Charge Pulse Oximetry - Multiple   Maintain adequate oxygenation

## 2022-02-03 NOTE — CARE UPDATE
02/03/22 0943   PRE-TX-O2   O2 Device (Oxygen Therapy) room air   Pulse Oximetry Type Continuous   $ Pulse Oximetry - Multiple Charge Pulse Oximetry - Multiple   Resp 15   Respiratory Evaluation   $ Care Plan Tech Time 15 min

## 2022-02-03 NOTE — PLAN OF CARE
Problem: Adult Inpatient Plan of Care  Goal: Plan of Care Review  2/3/2022 0144 by Vianca Tan RN  Outcome: Ongoing, Progressing  2/3/2022 0143 by Vianca Tan RN  Outcome: Ongoing, Progressing  Goal: Patient-Specific Goal (Individualized)  2/3/2022 0144 by Vianca Tan RN  Outcome: Ongoing, Progressing  2/3/2022 0143 by Vianca Tan RN  Outcome: Ongoing, Progressing  Goal: Absence of Hospital-Acquired Illness or Injury  2/3/2022 0144 by Vianca Tan RN  Outcome: Ongoing, Progressing  2/3/2022 0143 by Vianca Tan RN  Outcome: Ongoing, Progressing  Goal: Optimal Comfort and Wellbeing  2/3/2022 0144 by Vianca Tan RN  Outcome: Ongoing, Progressing  2/3/2022 0143 by Vianca Tan RN  Outcome: Ongoing, Progressing  Goal: Readiness for Transition of Care  2/3/2022 0144 by Vianca Tan RN  Outcome: Ongoing, Progressing  2/3/2022 0143 by Vianca Tan RN  Outcome: Ongoing, Progressing     Problem: Fall Injury Risk  Goal: Absence of Fall and Fall-Related Injury  2/3/2022 0144 by Vianca Tan RN  Outcome: Ongoing, Progressing  2/3/2022 0143 by Vinaca Tan RN  Outcome: Ongoing, Progressing     Problem: Dysrhythmia  Goal: Normalized Cardiac Rhythm  Outcome: Ongoing, Progressing     Problem: Chest Pain  Goal: Resolution of Chest Pain Symptoms  Outcome: Ongoing, Progressing

## 2022-02-03 NOTE — CONSULTS
[facility]  Department of Cardiology  Consult Note      PATIENT NAME: Tyra Isaac    MRN: [id number]  TODAY'S DATE: 02/03/2022  ADMIT DATE: 2/2/2022                          CONSULT REQUESTED BY: Ousmane Mcgregor MD    SUBJECTIVE     PRINCIPAL PROBLEM: Atrial fibrillation with RVR      REASON FOR CONSULT:    From H&P: Tyra Isaac is a 81 y.o. female who has PMH of  ESRD on HD via are right upper extremity AVF MWF, paroxysmal atrial fibrillation, on Coumadin, chronic anemia, hypertension, hyperlipidemia, anxiety/depression, presenting with chest pain   The patient presented to [facility] on 2/2/2022 with a primary complaint of Chest Pain (Afib RVR from dialysis, completed dialysis today)  .   Patient got recently discharged 5 days ago with similar presentation, underwent stress test which was negative for reversible ischemia, Lopressor 25 b.i.d. added for Afib  Had successful dialysis session on Monday, for mild chest discomfort today prior to dialysis this morning, which got worsened after 3hr dialysis, with 8/10 no radiating precordial chest pain associated with lightheadedness, patient also noted to have elevated blood pressure with increased heart rate, unsure about the numbers.  Patient received sublingual nitroglycerin.  However pain did not resolve until heart rate controlled, overall pain lasted for 45 minutes, she denied any palpitation nausea short of breath.     In ED prior to initiation of Cardizem drip, patient had episode of syncope with NBNB emesis HR was 200, normotensive,CT head and CXR were unremarkable.  Labs were unremarkable at baseline with mild hyperkalemia 3.4, INR 1.6, troponin 0.045 better than last wk , EKG afib rvr  with st depression 2/2 demand ischemia  During my evaluation patient denies any chest pain, remains on AFib rate controlled on Cardizem drip      HPI:    Ms. Isaac is an a an 81-year-old female who presented to the emergency room  after having chest discomfort during the dialysis.  She reportedly had gone into AFib with RVR.  She was brought to emergency room at which time she had quite an elevated heart rate in the 190s. Her EKG had significant ST depression during that time.         Review of patient's allergies indicates:   Allergen Reactions    Cyclobenzaprine     Fish containing products Hives    Peanut     Tramadol Itching       Past Medical History:   Diagnosis Date    A-fib     Anxiety     Depression     Disorder of kidney and ureter     Encephalopathy acute 1/1/2018    End stage kidney disease 6/17/2017    Gout     Hyperlipidemia     Hypertension     Moderate episode of recurrent major depressive disorder 1/17/2018    Nephropathy hypertensive, stage 5 chronic kidney disease or end stage renal disease 6/17/2017    Obstructive pattern present on pulmonary function testing 7/28/2021    Shows moderate obstruction.    Osteopenia of multiple sites 3/9/2018    Based upon bone density measurements. Patient also has chronic kidney disease.    Stroke 11/2016     Past Surgical History:   Procedure Laterality Date    CARDIAC SURGERY      stents    WRIST SURGERY       Social History     Tobacco Use    Smoking status: Never Smoker    Smokeless tobacco: Never Used   Substance Use Topics    Alcohol use: No    Drug use: No        REVIEW OF SYSTEMS  CONSTITUTIONAL: Negative for chills, fatigue and fever.   EYES: No double vision, No blurred vision  NEURO: No headaches, No dizziness; + syncope  RESPIRATORY: Negative for cough, shortness of breath and wheezing.    CARDIOVASCULAR: + for chest pain. Negative for palpitations and leg swelling.   GI: Negative for abdominal pain, No melena, diarrhea, nausea and vomiting.   : Negative for dysuria and frequency, Negative for hematuria  SKIN: Negative for bruising, Negative for edema or discoloration noted.   PSYCHIATRIC: Negative for depression, Negative for anxiety, Negative for  memory loss  MUSCULOSKELETAL: Negative for neck pain, Negative for muscle weakness, Negative for back pain     OBJECTIVE     VITAL SIGNS (Most Recent)  Temp: 99.6 °F (37.6 °C) (02/03/22 0700)  Pulse: 66 (02/03/22 0700)  Resp: 15 (02/03/22 0943)  BP: (!) 109/53 (02/03/22 0700)  SpO2: 98 % (02/03/22 0700)    VENTILATION STATUS  Resp: 15 (02/03/22 0943)  SpO2: 98 % (02/03/22 0700)       I & O (Last 24H):    Intake/Output Summary (Last 24 hours) at 2/3/2022 1124  Last data filed at 2/3/2022 0800  Gross per 24 hour   Intake 490 ml   Output 1 ml   Net 489 ml       WEIGHTS  Wt Readings from Last 3 Encounters:   02/02/22 1640 56 kg (123 lb 7.3 oz)   02/02/22 1055 56.7 kg (125 lb)   01/26/22 1604 55.6 kg (122 lb 9.2 oz)   01/26/22 1052 55.8 kg (123 lb)   10/11/21 1107 55.8 kg (123 lb)       PHYSICAL EXAM  CONSTITUTIONAL: No fever, no chills  HEENT: Normocephalic, atraumatic,pupils reactive to light                 NECK:  No JVD no carotid bruit  CVS: S1S2+, RRR  LUNGS: Clear  ABDOMEN: Soft, NT, BS+  EXTREMITIES: No cyanosis, edema  : No diaz catheter  NEURO: AAO X 3  PSY: Normal affect      HOME MEDICATIONS:  No current facility-administered medications on file prior to encounter.     Current Outpatient Medications on File Prior to Encounter   Medication Sig Dispense Refill    amLODIPine (NORVASC) 5 MG tablet Take 5 mg by mouth once daily.      atorvastatin (LIPITOR) 40 MG tablet Take 40 mg by mouth once daily.      b complex vitamins tablet Take 1 tablet by mouth once daily.      calcium acetate (PHOSLO) 667 mg capsule Take 667 mg by mouth Daily.  6    docusate sodium (COLACE) 100 MG capsule Take 100 mg by mouth once daily.       dorzolamide-timolol 2-0.5% (COSOPT) 22.3-6.8 mg/mL ophthalmic solution Place 1 drop into both eyes 2 (two) times daily.       latanoprost 0.005 % ophthalmic solution Place 1 drop into both eyes every evening.       lidocaine 5 % Crea Apply 1 application topically every Mon, Wed, Fri.        warfarin (COUMADIN) 2 MG tablet Take 2 mg by mouth every Mon, Wed, Fri.      warfarin (COUMADIN) 3 MG tablet Take 3 mg by mouth every Tuesday, Thursday, Saturday, Sunday.      metoprolol tartrate (LOPRESSOR) 25 MG tablet Take 1 tablet (25 mg total) by mouth 2 (two) times daily. 60 tablet 0    warfarin (COUMADIN) 1 MG tablet Take 1 tablet (1 mg total) by mouth Daily. Continue to take coumadin as you were prior to admission with intermittent INR checks and dose adjustments as needed 30 tablet 0       SCHEDULED MEDS:   aspirin  325 mg Oral Daily    atorvastatin  40 mg Oral Daily    calcium acetate(phosphat bind)  667 mg Oral Daily    docusate sodium  100 mg Oral Daily    dorzolamide-timolol 2-0.5%  1 drop Both Eyes BID    latanoprost  1 drop Both Eyes QHS    warfarin  2 mg Oral Every Mon, Wed, Fri    warfarin  3 mg Oral Every Tues, Thurs, Sat, Sun       CONTINUOUS INFUSIONS:    PRN MEDS:acetaminophen, melatonin, nitroGLYCERIN, ondansetron, ondansetron, polyethylene glycol, sodium chloride 0.9%    LABS AND DIAGNOSTICS     CBC LAST 3 DAYS  Recent Labs   Lab 01/29/22  0422 01/29/22  0422 02/02/22  1117 02/03/22  0323   WBC 5.77  --  7.17 7.67   RBC 3.60*  --  3.97* 3.37*   HGB 11.0*  --  11.8* 10.2*   HCT 35.9*  --  38.4 33.3*   *  --  97 99*   MCH 30.6  --  29.7 30.3   MCHC 30.6*  --  30.7* 30.6*   RDW 13.3  --  13.2 13.2     --  267 239   MPV 9.6  --  9.4 9.7   GRAN 60.8  3.5   < > 39.9  2.9 61.4  4.7   LYMPH 20.1  1.2   < > 35.3  2.5 16.4*  1.3   MONO 13.5  0.8   < > 13.7  1.0 13.6  1.0   BASO 0.03  --  0.04 0.02   NRBC 0  --  0 0    < > = values in this interval not displayed.       COAGULATION LAST 3 DAYS  Recent Labs   Lab 01/29/22  0422 02/02/22  1117 02/03/22  0323   LABPT 17.0* 17.9* 19.9*   INR 1.5 1.6 1.8   APTT  --  30.0 31.2       CHEMISTRY LAST 3 DAYS  Recent Labs   Lab 01/29/22  0422 02/02/22  1117 02/03/22  0323    138 134*   K 4.6 3.4* 5.1   CL 96 91* 94*   CO2  33* 31* 31*   ANIONGAP 12 16 9   BUN 27* 16 26*   CREATININE 6.0* 4.1* 5.9*   GLU 88 89 84   CALCIUM 8.7 9.1 8.4*   MG 2.1 2.1 2.0   ALBUMIN  --  3.8 3.1*   PROT  --  7.6 6.2   ALKPHOS  --  73 59   ALT  --  26 22   AST  --  21 18   BILITOT  --  0.7 0.9       CARDIAC PROFILE LAST 3 DAYS  Recent Labs   Lab 02/02/22  1117 02/02/22  1701 02/02/22  2213 02/02/22  2214   *  --   --   --      --   --   --    CPKMB 2.3 2.8 2.0  --    TROPONINI 0.045* 0.068*  --  0.068*       ENDOCRINE LAST 3 DAYS  No results for input(s): TSH, PROCAL in the last 168 hours.    LAST ARTERIAL BLOOD GAS  ABG  No results for input(s): PH, PO2, PCO2, HCO3, BE in the last 168 hours.    LAST 7 DAYS MICROBIOLOGY   Microbiology Results (last 7 days)     ** No results found for the last 168 hours. **          MOST RECENT IMAGING  CT Head Without Contrast  CMS MANDATED QUALITY DATA - CT RADIATION 436    All CT scans at this facility utilize dose modulation, iterative reconstruction, and/or weight based dosing when appropriate to reduce radiation dose to as low as reasonably achievable.    CLINICAL HISTORY:  81 years (1940) Female Mental status change, unknown cause    TECHNIQUE:  CT HEAD WITHOUT IV CONTRAST. 115 images obtained. Axial CT of the brain without contrast using soft tissue and bone algorithm. Please note in the acute setting if there is a clinical concern for an acute stroke MRI would be more sensitive/specific for evaluation of ischemia.    COMPARISON:  None available.    FINDINGS:  No acute intracranial hemorrhage, hydrocephalus, herniation or midline shift and the basal and suprasellar cisterns are patent. No acute skull fracture is identified.    Mild periventricular deep cerebral white matter low attenuation, a nonspecific finding in this age group which can be seen in any diffuse white matter process but which is most commonly associated with chronic microvascular ischemic disease. There are scattered atheromatous  calcifications in the intracranial internal carotid arteries.    The orbits appear within normal limits noting likely bilateral lens replacement. External auditory canals are unremarkable. The visualized paranasal sinuses and mastoid air cells are essentially clear.    IMPRESSION:  1. No acute intracranial process.  2. Chronic/involutional findings as noted above.    .    Electronically signed by:  Rahul Palacio MD  2/2/2022 12:10 PM CST Workstation: [id number]  X-Ray Chest AP Portable  Reason: Chest Pain    FINDINGS:    PA and lateral chest with comparison chest x-ray January 26, 2022 show normal cardiomediastinal silhouette.  Lungs are clear. Pulmonary vasculature is normal. No acute osseous abnormality.    IMPRESSION:    No acute cardiopulmonary abnormality.    Electronically signed by:  Mike Clayton DO  2/2/2022 11:53 AM CST Workstation: [id number]      ECHOCARDIOGRAM RESULTS (last 5)  Results for orders placed during the hospital encounter of 01/26/22    Echo    Interpretation Summary  · The left ventricle is normal in size with mild concentric hypertrophy and normal systolic function.  · The estimated ejection fraction is 65%.  · Grade II left ventricular diastolic dysfunction.  · Atrial fibrillation not observed.  · Normal right ventricular size with normal right ventricular systolic function.  · Moderate to severe left atrial enlargement.  · Mild right atrial enlargement.  · Mild mitral regurgitation.  · Mild to moderate tricuspid regurgitation.  · Normal central venous pressure (3 mmHg).  · The estimated PA systolic pressure is 36 mmHg.  · Mildly elevated gradient accross mitral valve of around 6 mmHg at HR of 64 bpm. MVA by pressure half time is normal, however this can be inaccurate.      Results for orders placed during the hospital encounter of 03/03/21    Echo Color Flow Doppler? Yes    Interpretation Summary  · The left ventricle is small with concentric remodeling and normal systolic function.  The estimated ejection fraction is 63%  · The estimated PA systolic pressure is 38 mmHg.  · Grade II left ventricular diastolic dysfunction.  · Normal right ventricular size with normal right ventricular systolic function.  · Mild-to-moderate mitral regurgitation.  · Mild to moderate tricuspid regurgitation.  · Normal central venous pressure (3 mmHg).  · The patient converted from atrial fibrillation to normal sinus rhythm 03/03/2021      Results for orders placed during the hospital encounter of 12/13/19    Echo Color Flow Doppler? Yes    Interpretation Summary  · Mild concentric left ventricular hypertrophy.  · Normal left ventricular systolic function. The estimated ejection fraction is 70%  · Grade II (moderate) left ventricular diastolic dysfunction consistent with pseudonormalization.  · The estimated PA systolic pressure is 37 mm Hg  · No wall motion abnormalities.  · Normal right ventricular systolic function.  · Normal central venous pressure (3 mm Hg).  · Mild mitral sclerosis.  · Mild mitral regurgitation.  · Mild to moderate tricuspid regurgitation.      CURRENT/PREVIOUS VISIT EKG  Results for orders placed or performed during the hospital encounter of 02/02/22   EKG 12-lead    Collection Time: 02/02/22 11:04 AM    Narrative    Test Reason : R07.9,    Vent. Rate : 198 BPM     Atrial Rate : 241 BPM     P-R Int : 000 ms          QRS Dur : 088 ms      QT Int : 216 ms       P-R-T Axes : 000 005 250 degrees     QTc Int : 392 ms    Atrial fibrillation with rapid ventricular response  Marked ST abnormality, possible inferior subendocardial injury  Marked ST abnormality, possible anteroseptal subendocardial injury  Abnormal ECG  When compared with ECG of 02-FEB-2022 11:02,  T wave inversion now evident in Inferior leads  T wave inversion now evident in Anterior leads    Referred By: [email or web address]   SELF           Confirmed By:            ASSESSMENT/PLAN:     There are no active hospital problems to display for this  patient.      ASSESSMENT & PLAN:     1. Afib with RVR- now in SR  2. Syncope  3. Chest pain  4. ESRD on HD  5. Chronic anemia      RECOMMENDATIONS:    1. Will start her amiodarone 200 mg PO TID x 5 days then 200 mg po BID.   2. Patient noted to have significant ST depression when her heart rate was in the 140s to 190s in atrial fibrillation.  Appears to be a global ischemia.  Advised patient on option for medical management versus angiogram with possible PCI.  Risks of angiogram include but not limited to bleeding, vascular damage, renal failure, stroke, myocardial infarction, emergency bypass surgery and death.  All questions have been answered to patient's satisfaction, and patient has voiced desire to proceed with the procedure.   3. Notified CT surgery that we may need back up.   Will also inform nephrology of the plans to coordinate dialysis.   4. Keep NPO after midnight for angiogram tomorrow.   Hold warfarin.   Start Lovenox 0.75 mg SubQ daily.   5. Will follow.         Jacquie Carter NP  [facility]  Department of Cardiology  Date of Service: 02/03/2022      I have seen the patient, reviewed the Nurse Practitioner's history and physical, assessment, plan, progress note and consultation note. I have personally interviewed and examined the patient at bedside and agree with the findings.     1. Patient has been having paroxysmal atrial fibrillation especially while she is in dialysis.  Need to keep her in sinus rhythm so that she does not have AFib with RVR will start her on amiodarone 200 mg p.o. t.i.d. and titrate the doses.  2. Patient had significant ST depression in all the precordial leads as well as limb leads when her heart rate was 198 beats per minute.  Patient probably has significant coronary artery disease and this is her 2nd admission and would probably benefit from cardiac catheterization and coronary angiography.  Will discussed with patient and the family again.       [facility]  MD Mel  Department of Cardiology  [facility]

## 2022-02-03 NOTE — CONSULTS
Nephrology Consult Note        Patient Name: Tyra Isaac  MRN: [id number]    Patient Class: IP- Inpatient   Admission Date: 2/2/2022  Length of Stay: 1 days  Date of Service: 2/3/2022    Attending Physician: Ousmane Mcgregor MD  Primary Care Provider: Kalpesh Goel MD    Reason for Consult: esrd/anemia/htn/shpt/chest pain/afrvr    SUBJECTIVE:     HPI: 81F with ESRD on HD via RUE AVF on MWF, paroxysmal atrial fibrillation, on Coumadin, chronic anemia, hypertension, hyperlipidemia, anxiety/depression, presenting with chest pain and Afib RVR from dialysis, completed dialysis today. Patient discharged 5 days ago after similar presentation, underwent stress test which was negative for reversible ischemia, Lopressor 25 b.i.d. added for Afib. Patient received sublingual nitroglycerin today, however pain did not resolve until heart rate controlled, overall pain lasted for 45 minutes, she denied any palpitation nausea short of breath.    2/3 VSS, no new complains. HD in AM or can be dc from renal standpoint.    Past Medical History:   Diagnosis Date    A-fib     Anxiety     Depression     Disorder of kidney and ureter     Encephalopathy acute 1/1/2018    End stage kidney disease 6/17/2017    Gout     Hyperlipidemia     Hypertension     Moderate episode of recurrent major depressive disorder 1/17/2018    Nephropathy hypertensive, stage 5 chronic kidney disease or end stage renal disease 6/17/2017    Obstructive pattern present on pulmonary function testing 7/28/2021    Shows moderate obstruction.    Osteopenia of multiple sites 3/9/2018    Based upon bone density measurements. Patient also has chronic kidney disease.    Stroke 11/2016     Past Surgical History:   Procedure Laterality Date    CARDIAC SURGERY      stents    WRIST SURGERY       Family History   Problem Relation Age of Onset    Heart disease Mother     Cancer Father      Social History     Tobacco Use    Smoking status: Never Smoker     Smokeless tobacco: Never Used   Substance Use Topics    Alcohol use: No    Drug use: No       Review of patient's allergies indicates:   Allergen Reactions    Cyclobenzaprine     Fish containing products Hives    Peanut     Tramadol Itching       Outpatient meds:  No current facility-administered medications on file prior to encounter.     Current Outpatient Medications on File Prior to Encounter   Medication Sig Dispense Refill    amLODIPine (NORVASC) 5 MG tablet Take 5 mg by mouth once daily.      atorvastatin (LIPITOR) 40 MG tablet Take 40 mg by mouth once daily.      b complex vitamins tablet Take 1 tablet by mouth once daily.      calcium acetate (PHOSLO) 667 mg capsule Take 667 mg by mouth Daily.  6    docusate sodium (COLACE) 100 MG capsule Take 100 mg by mouth once daily.       dorzolamide-timolol 2-0.5% (COSOPT) 22.3-6.8 mg/mL ophthalmic solution Place 1 drop into both eyes 2 (two) times daily.       latanoprost 0.005 % ophthalmic solution Place 1 drop into both eyes every evening.       lidocaine 5 % Crea Apply 1 application topically every Mon, Wed, Fri.       warfarin (COUMADIN) 2 MG tablet Take 2 mg by mouth every Mon, Wed, Fri.      warfarin (COUMADIN) 3 MG tablet Take 3 mg by mouth every Tuesday, Thursday, Saturday, Sunday.      metoprolol tartrate (LOPRESSOR) 25 MG tablet Take 1 tablet (25 mg total) by mouth 2 (two) times daily. 60 tablet 0    warfarin (COUMADIN) 1 MG tablet Take 1 tablet (1 mg total) by mouth Daily. Continue to take coumadin as you were prior to admission with intermittent INR checks and dose adjustments as needed 30 tablet 0       Scheduled meds:   aspirin  325 mg Oral Daily    atorvastatin  40 mg Oral Daily    calcium acetate(phosphat bind)  667 mg Oral Daily    docusate sodium  100 mg Oral Daily    dorzolamide-timolol 2-0.5%  1 drop Both Eyes BID    latanoprost  1 drop Both Eyes QHS    warfarin  2 mg Oral Every Mon, Wed, Fri    warfarin  3 mg Oral Every  Tues, Thurs, Sat, Sun       Infusions:      PRN meds:      Review of Systems:  Review of Systems   Constitutional: Negative for chills, fever, malaise/fatigue and weight loss.   HENT: Negative for hearing loss and nosebleeds.    Eyes: Negative for blurred vision, double vision and photophobia.   Respiratory: Negative for cough, shortness of breath and wheezing.    Cardiovascular: Negative for chest pain, palpitations and leg swelling.   Gastrointestinal: Negative for abdominal pain, constipation, diarrhea, heartburn, nausea and vomiting.   Genitourinary: Negative for dysuria, frequency and urgency.   Musculoskeletal: Negative for falls, joint pain and myalgias.   Skin: Negative for itching and rash.   Neurological: Negative for dizziness, speech change, focal weakness, loss of consciousness and headaches.   Endo/Heme/Allergies: Does not bruise/bleed easily.   Psychiatric/Behavioral: Negative for depression and substance abuse. The patient is not nervous/anxious.      OBJECTIVE:     Vital Signs and IO (Last 24H):  Temp:  [98.1 °F (36.7 °C)-100.3 °F (37.9 °C)]   Pulse:  []   Resp:  [16-43]   BP: ()/(53-67)   SpO2:  [95 %-100 %]   I/O last 3 completed shifts:  In: 490 [P.O.:240; IV Piggyback:250]  Out: -     Wt Readings from Last 5 Encounters:   02/02/22 56 kg (123 lb 7.3 oz)   01/26/22 55.6 kg (122 lb 9.2 oz)   10/11/21 55.8 kg (123 lb)   06/25/21 57 kg (125 lb 10.6 oz)   06/07/21 57.2 kg (126 lb)     Physical Exam:  Physical Exam  Constitutional:       Appearance: She is well-developed. She is not diaphoretic.   HENT:      Head: Normocephalic and atraumatic.   Eyes:      General: No scleral icterus.     Pupils: Pupils are equal, round, and reactive to light.   Cardiovascular:      Rate and Rhythm: Normal rate and regular rhythm.   Pulmonary:      Effort: Pulmonary effort is normal. No respiratory distress.      Breath sounds: No stridor.   Abdominal:      General: There is no distension.      Palpations:  Abdomen is soft.   Musculoskeletal:         General: No deformity. Normal range of motion.      Cervical back: Neck supple.   Skin:     General: Skin is warm and dry.      Findings: No erythema or rash.   Neurological:      Mental Status: She is alert and oriented to person, place, and time.      Cranial Nerves: No cranial nerve deficit.   Psychiatric:         Behavior: Behavior normal.     Body mass index is 21.19 kg/m².    Laboratory:  Recent Labs   Lab 01/29/22 0422 02/02/22 1117 02/03/22  0323    138 134*   K 4.6 3.4* 5.1   CL 96 91* 94*   CO2 33* 31* 31*   BUN 27* 16 26*   CREATININE 6.0* 4.1* 5.9*   ESTGFRAFRICA 7.0* 11.1* 7.1*   EGFRNONAA 6.1* 9.6* 6.2*   GLU 88 89 84       Recent Labs   Lab 01/29/22 0422 02/02/22 1117 02/03/22  0323   CALCIUM 8.7 9.1 8.4*   ALBUMIN  --  3.8 3.1*   PHOS  --   --  3.6   MG 2.1 2.1 2.0             No results for input(s): POCTGLUCOSE in the last 168 hours.    Recent Labs   Lab 01/26/22  1525   Hemoglobin A1C 5.3       Recent Labs   Lab 01/29/22 0422 01/29/22 0422 02/02/22 1117 02/03/22  0323   WBC 5.77  --  7.17 7.67   HGB 11.0*  --  11.8* 10.2*   HCT 35.9*  --  38.4 33.3*     --  267 239   *  --  97 99*   MCHC 30.6*  --  30.7* 30.6*   MONO 13.5  0.8   < > 13.7  1.0 13.6  1.0    < > = values in this interval not displayed.       Recent Labs   Lab 02/02/22 1117 02/03/22  0323   BILITOT 0.7 0.9   PROT 7.6 6.2   ALBUMIN 3.8 3.1*   ALKPHOS 73 59   ALT 26 22   AST 21 18       Recent Labs   Lab 02/02/22  1605   Color, UA Yellow   Appearance, UA Clear   pH, UA >8.0 A   Specific Gravity, UA 1.005   Protein, UA 1+ A   Glucose, UA Negative   Ketones, UA Negative   Urobilinogen, UA Negative   Bilirubin (UA) Negative   Occult Blood UA Negative   Nitrite, UA Negative   RBC, UA 1   WBC, UA 1   Bacteria Negative   Hyaline Casts, UA 3 A       Recent Labs   Lab 12/13/19  1243   POC PH 7.51 H   POC PCO2 43   POC PO2 90   POC SATURATED O2 97.9       Microbiology  Results (last 7 days)     ** No results found for the last 168 hours. **        ASSESSMENT/PLAN:     Active Hospital Problems    Diagnosis  POA    *Atrial fibrillation with RVR [I48.91]  Unknown      Resolved Hospital Problems   No resolved problems to display.     ESRD on HD MWF via AVF  Continue current dialysis prescription.  Next HD per schedule or PRN.  Renal diet - low K, low phos.  No IVs or BP checks on access and/or non-dominant arm.    Anemia of CKD  Hgb and HCT are acceptable. Monitor.  Will provide BRAYAN and/or IV iron PRN.    MBD / Secondary HPT  Ca, phos, PTH and vitamin D levels are acceptable.   Phos binders, vitamin D analogues and calcimimetics as needed.    HTN  BP seem controlled.   Tolerate asymptomatic HTN up to -160.  Continue home meds.  Low sodium diet.    AF with RVR  Chest pain  Awaiting cards input.    Thank you for allowing us to participate in the care of your patient!   We will follow the patient and provide recommendations as needed.    Patient care time was spent personally by me on the following activities:   · Obtaining a history.  · Examination of patient.  · Providing medical care at the patients bedside.  · Developing a treatment plan with patient or surrogate and bedside caregivers.  · Ordering and reviewing laboratory studies, radiographic studies, pulse oximetry.  · Ordering and performing treatments and interventions.  · Evaluation of patient's response to treatment.  · Discussions with consultants while on the unit and immediately available to the patient.  · Re-evaluation of the patient's condition.  · Documentation in the medical record.     Raleigh Yee MD    [facility]  [address] [place] [id number]    [phone] - tel  [phone] - fax    2/3/2022

## 2022-02-04 LAB
ALBUMIN SERPL BCP-MCNC: 3.2 G/DL (ref 3.5–5.2)
ALP SERPL-CCNC: 57 U/L (ref 55–135)
ALT SERPL W/O P-5'-P-CCNC: 21 U/L (ref 10–44)
ANION GAP SERPL CALC-SCNC: 12 MMOL/L (ref 8–16)
AST SERPL-CCNC: 19 U/L (ref 10–40)
BILIRUB SERPL-MCNC: 0.8 MG/DL (ref 0.1–1)
BUN SERPL-MCNC: 41 MG/DL (ref 8–23)
CALCIUM SERPL-MCNC: 8.8 MG/DL (ref 8.7–10.5)
CHLORIDE SERPL-SCNC: 96 MMOL/L (ref 95–110)
CO2 SERPL-SCNC: 29 MMOL/L (ref 23–29)
CREAT SERPL-MCNC: 8.4 MG/DL (ref 0.5–1.4)
ERYTHROCYTE [DISTWIDTH] IN BLOOD BY AUTOMATED COUNT: 13 % (ref 11.5–14.5)
EST. GFR  (AFRICAN AMERICAN): 4.7 ML/MIN/1.73 M^2
EST. GFR  (NON AFRICAN AMERICAN): 4 ML/MIN/1.73 M^2
GLUCOSE SERPL-MCNC: 79 MG/DL (ref 70–110)
HCT VFR BLD AUTO: 32.9 % (ref 37–48.5)
HGB BLD-MCNC: 10.3 G/DL (ref 12–16)
MCH RBC QN AUTO: 30.3 PG (ref 27–31)
MCHC RBC AUTO-ENTMCNC: 31.3 G/DL (ref 32–36)
MCV RBC AUTO: 97 FL (ref 82–98)
PLATELET # BLD AUTO: 239 K/UL (ref 150–450)
PMV BLD AUTO: 9.5 FL (ref 9.2–12.9)
POTASSIUM SERPL-SCNC: 5.1 MMOL/L (ref 3.5–5.1)
PROT SERPL-MCNC: 6.5 G/DL (ref 6–8.4)
RBC # BLD AUTO: 3.4 M/UL (ref 4–5.4)
SODIUM SERPL-SCNC: 137 MMOL/L (ref 136–145)
WBC # BLD AUTO: 5.36 K/UL (ref 3.9–12.7)

## 2022-02-04 PROCEDURE — 25000003 PHARM REV CODE 250: Performed by: INTERNAL MEDICINE

## 2022-02-04 PROCEDURE — 99900035 HC TECH TIME PER 15 MIN (STAT)

## 2022-02-04 PROCEDURE — 90935 HEMODIALYSIS ONE EVALUATION: CPT

## 2022-02-04 PROCEDURE — 21000000 HC CCU ICU ROOM CHARGE

## 2022-02-04 PROCEDURE — 94761 N-INVAS EAR/PLS OXIMETRY MLT: CPT

## 2022-02-04 PROCEDURE — 85027 COMPLETE CBC AUTOMATED: CPT | Performed by: INTERNAL MEDICINE

## 2022-02-04 PROCEDURE — 99900031 HC PATIENT EDUCATION (STAT)

## 2022-02-04 PROCEDURE — 36415 COLL VENOUS BLD VENIPUNCTURE: CPT | Performed by: INTERNAL MEDICINE

## 2022-02-04 PROCEDURE — 63600175 PHARM REV CODE 636 W HCPCS: Performed by: NURSE PRACTITIONER

## 2022-02-04 PROCEDURE — 99233 SBSQ HOSP IP/OBS HIGH 50: CPT | Mod: ,,, | Performed by: INTERNAL MEDICINE

## 2022-02-04 PROCEDURE — 80053 COMPREHEN METABOLIC PANEL: CPT | Performed by: INTERNAL MEDICINE

## 2022-02-04 PROCEDURE — 99233 PR SUBSEQUENT HOSPITAL CARE,LEVL III: ICD-10-PCS | Mod: ,,, | Performed by: INTERNAL MEDICINE

## 2022-02-04 RX ORDER — MUPIROCIN 20 MG/G
OINTMENT TOPICAL 2 TIMES DAILY
Status: COMPLETED | OUTPATIENT
Start: 2022-02-04 | End: 2022-02-08

## 2022-02-04 RX ORDER — SODIUM CHLORIDE 9 MG/ML
INJECTION, SOLUTION INTRAVENOUS ONCE
Status: DISCONTINUED | OUTPATIENT
Start: 2022-02-04 | End: 2022-02-08 | Stop reason: HOSPADM

## 2022-02-04 RX ORDER — ENOXAPARIN SODIUM 100 MG/ML
30 INJECTION SUBCUTANEOUS
Status: COMPLETED | OUTPATIENT
Start: 2022-02-04 | End: 2022-02-06

## 2022-02-04 RX ORDER — DIPHENHYDRAMINE HCL 25 MG
50 CAPSULE ORAL
Status: DISCONTINUED | OUTPATIENT
Start: 2022-02-04 | End: 2022-02-04 | Stop reason: HOSPADM

## 2022-02-04 RX ADMIN — AMIODARONE HYDROCHLORIDE 200 MG: 200 TABLET ORAL at 11:02

## 2022-02-04 RX ADMIN — MUPIROCIN: 20 OINTMENT TOPICAL at 11:02

## 2022-02-04 RX ADMIN — ATORVASTATIN CALCIUM 40 MG: 40 TABLET, FILM COATED ORAL at 11:02

## 2022-02-04 RX ADMIN — AMIODARONE HYDROCHLORIDE 200 MG: 200 TABLET ORAL at 12:02

## 2022-02-04 RX ADMIN — LATANOPROST 1 DROP: 50 SOLUTION OPHTHALMIC at 11:02

## 2022-02-04 RX ADMIN — AMIODARONE HYDROCHLORIDE 200 MG: 200 TABLET ORAL at 04:02

## 2022-02-04 RX ADMIN — CALCIUM ACETATE 667 MG: 667 CAPSULE ORAL at 04:02

## 2022-02-04 RX ADMIN — DORZOLAMIDE HYDROCHLORIDE AND TIMOLOL MALEATE 1 DROP: 22.3; 6.8 SOLUTION/ DROPS OPHTHALMIC at 09:02

## 2022-02-04 RX ADMIN — ENOXAPARIN SODIUM 30 MG: 30 INJECTION SUBCUTANEOUS at 12:02

## 2022-02-04 RX ADMIN — DORZOLAMIDE HYDROCHLORIDE AND TIMOLOL MALEATE 1 DROP: 22.3; 6.8 SOLUTION/ DROPS OPHTHALMIC at 11:02

## 2022-02-04 RX ADMIN — MUPIROCIN: 20 OINTMENT TOPICAL at 12:02

## 2022-02-04 RX ADMIN — ASPIRIN 325 MG ORAL TABLET 325 MG: 325 PILL ORAL at 12:02

## 2022-02-04 NOTE — PROGRESS NOTES
[facility]  Department of Cardiology  Progress Note    PATIENT NAME: Tyra Isaac  MRN: [id number]  TODAY'S DATE: 02/04/2022  ADMIT DATE: 2/2/2022    SUBJECTIVE     PRINCIPLE PROBLEM: Chest pain    INTERVAL HISTORY:    2/4/2022  Patient is comfortable today denies any chest pain or tightness or heaviness.  Patient was brought to the cardiac cath lab and was on the table done it was realized that her INR was elevated and patient was taken off the table.    Review of patient's allergies indicates:   Allergen Reactions    Cyclobenzaprine     Fish containing products Hives    Peanut     Tramadol Itching       REVIEW OF SYSTEMS  CARDIOVASCULAR: No recent chest pain, palpitations, arm, neck, or jaw pain  RESPIRATORY: No recent fever, cough chills, SOB or congestion  : No blood in the urine  GI: No Nausea, vomiting, constipation, diarrhea, blood, or reflux.  MUSCULOSKELETAL: No myalgias  NEURO: No lightheadedness or dizziness  EYES: No Double vision, blurry, vision or headache     OBJECTIVE     VITAL SIGNS (Most Recent)  Temp: 98.3 °F (36.8 °C) (02/04/22 0700)  Pulse: 66 (02/04/22 0505)  Resp: 18 (02/04/22 0505)  BP: (!) 121/59 (02/04/22 0700)  SpO2: 98 % (02/04/22 0505)    VENTILATION STATUS  Resp: 18 (02/04/22 0505)  SpO2: 98 % (02/04/22 0505)       I & O (Last 24H):    Intake/Output Summary (Last 24 hours) at 2/4/2022 1152  Last data filed at 2/3/2022 2300  Gross per 24 hour   Intake 840 ml   Output --   Net 840 ml       WEIGHTS  Wt Readings from Last 3 Encounters:   02/02/22 1640 56 kg (123 lb 7.3 oz)   02/02/22 1055 56.7 kg (125 lb)   01/26/22 1604 55.6 kg (122 lb 9.2 oz)   01/26/22 1052 55.8 kg (123 lb)   10/11/21 1107 55.8 kg (123 lb)       PHYSICAL EXAM  CONSTITUTIONAL: Well built, well nourished in no apparent distress  NECK: no carotid bruit, no JVD  LUNGS: CTA  CHEST WALL: no tenderness  HEART: regular rate and rhythm, S1, S2 normal, soft systolic murmur  ABDOMEN: soft, non-tender; bowel  sounds normal; no masses,  no organomegaly  EXTREMITIES: Extremities normal, no edema  NEURO: AAO X 3    SCHEDULED MEDS:   sodium chloride 0.9%   Intravenous Once    amiodarone  200 mg Oral TID    aspirin  325 mg Oral Daily    atorvastatin  40 mg Oral Daily    calcium acetate(phosphat bind)  667 mg Oral Daily    docusate sodium  100 mg Oral Daily    dorzolamide-timolol 2-0.5%  1 drop Both Eyes BID    enoxparin  30 mg Subcutaneous Q24H    latanoprost  1 drop Both Eyes QHS    mupirocin   Nasal BID       CONTINUOUS INFUSIONS:    PRN MEDS:acetaminophen, melatonin, nitroGLYCERIN, ondansetron, ondansetron, polyethylene glycol, sodium chloride 0.9%, sodium chloride 0.9%    LABS AND DIAGNOSTICS     CBC LAST 3 DAYS  Recent Labs   Lab 01/29/22 0422 01/29/22 0422 02/02/22 1117 02/03/22 0323 02/04/22  0628   WBC 5.77   < > 7.17 7.67 5.36   RBC 3.60*   < > 3.97* 3.37* 3.40*   HGB 11.0*   < > 11.8* 10.2* 10.3*   HCT 35.9*   < > 38.4 33.3* 32.9*   *   < > 97 99* 97   MCH 30.6   < > 29.7 30.3 30.3   MCHC 30.6*   < > 30.7* 30.6* 31.3*   RDW 13.3   < > 13.2 13.2 13.0      < > 267 239 239   MPV 9.6   < > 9.4 9.7 9.5   GRAN 60.8  3.5   < > 39.9  2.9 61.4  4.7  --    LYMPH 20.1  1.2   < > 35.3  2.5 16.4*  1.3  --    MONO 13.5  0.8   < > 13.7  1.0 13.6  1.0  --    BASO 0.03  --  0.04 0.02  --    NRBC 0  --  0 0  --     < > = values in this interval not displayed.       COAGULATION LAST 3 DAYS  Recent Labs   Lab 01/29/22 0422 02/02/22 1117 02/03/22  0323   LABPT 17.0* 17.9* 19.9*   INR 1.5 1.6 1.8   APTT  --  30.0 31.2       CHEMISTRY LAST 3 DAYS  Recent Labs   Lab 01/29/22  0422 01/29/22  0422 02/02/22  1117 02/03/22  0323 02/04/22  0628      < > 138 134* 137   K 4.6   < > 3.4* 5.1 5.1   CL 96   < > 91* 94* 96   CO2 33*   < > 31* 31* 29   ANIONGAP 12   < > 16 9 12   BUN 27*   < > 16 26* 41*   CREATININE 6.0*   < > 4.1* 5.9* 8.4*   GLU 88   < > 89 84 79   CALCIUM 8.7   < > 9.1 8.4* 8.8   MG 2.1   --  2.1 2.0  --    ALBUMIN  --   --  3.8 3.1* 3.2*   PROT  --   --  7.6 6.2 6.5   ALKPHOS  --   --  73 59 57   ALT  --   --  26 22 21   AST  --   --  21 18 19   BILITOT  --   --  0.7 0.9 0.8    < > = values in this interval not displayed.       CARDIAC PROFILE LAST 3 DAYS  Recent Labs   Lab 02/02/22  1117 02/02/22  1701 02/02/22  2213 02/02/22  2214   *  --   --   --      --   --   --    CPKMB 2.3 2.8 2.0  --    TROPONINI 0.045* 0.068*  --  0.068*       ENDOCRINE LAST 3 DAYS  No results for input(s): TSH, PROCAL in the last 168 hours.    LAST ARTERIAL BLOOD GAS  ABG  No results for input(s): PH, PO2, PCO2, HCO3, BE in the last 168 hours.    LAST 7 DAYS MICROBIOLOGY   Microbiology Results (last 7 days)     ** No results found for the last 168 hours. **          MOST RECENT IMAGING  Cardiac catheterization  Aborted invasive cardiology procedure- see Procedure Log link for details.      LASTECHO  Results for orders placed during the hospital encounter of 01/26/22    Echo    Interpretation Summary  · The left ventricle is normal in size with mild concentric hypertrophy and normal systolic function.  · The estimated ejection fraction is 65%.  · Grade II left ventricular diastolic dysfunction.  · Atrial fibrillation not observed.  · Normal right ventricular size with normal right ventricular systolic function.  · Moderate to severe left atrial enlargement.  · Mild right atrial enlargement.  · Mild mitral regurgitation.  · Mild to moderate tricuspid regurgitation.  · Normal central venous pressure (3 mmHg).  · The estimated PA systolic pressure is 36 mmHg.  · Mildly elevated gradient accross mitral valve of around 6 mmHg at HR of 64 bpm. MVA by pressure half time is normal, however this can be inaccurate.      CURRENT/PREVIOUS VISIT EKG  Results for orders placed or performed during the hospital encounter of 02/02/22   EKG 12-LEAD    Collection Time: 02/03/22 12:22 PM    Narrative    Test Reason :  R07.9,    Vent. Rate : 063 BPM     Atrial Rate : 063 BPM     P-R Int : 166 ms          QRS Dur : 072 ms      QT Int : 484 ms       P-R-T Axes : 091 032 060 degrees     QTc Int : 495 ms    Normal sinus rhythm  Septal infarct ,age undetermined  Abnormal ECG  When compared with ECG of 02-FEB-2022 11:04,  Significant changes have occurred    Referred By: [email or web address]   SELF           Confirmed By:        ASSESSMENT/PLAN:     Active Hospital Problems    Diagnosis    *Chest pain    Non-compliance    Atrial fibrillation with RVR    ESRD on HD via are right upper extremity AVF MWF    Benign hypertension with ESRD (end-stage renal disease)     BP slightly elevated1.21.15 BP elelvated. Took meds in [facility] will increase amlodipine to 10 mg per day. New prescription sent to the pharmacy. She will double the existing prescription. She will continue to monitor her blood pressures.         ASSESSMENT & PLAN:   1.  Paroxysmal atrial fibrillation  2.  Significant diffuse ST depression with AFib with RVR  3.  Chest pain during hemodialysis  4.  Atherosclerotic coronary artery disease status post stent placement in the proximal left circumflex artery  5.  End-stage renal disease on hemodialysis  6.  Essential hypertension  7.  Anemia of chronic disease.  8.  History of syncope      RECOMMENDATIONS:  1.  Patient has significant ST depression with atrial fibrillation with rapid ventricular rate.  Patient was scheduled for cardiac catheterization and coronary angiography but her PT INR was significantly elevated 1.8 and patient was also given Lovenox last night and she is a renal patient possibly the Lovenox is still in the system.  2.  Patient is not candidate for radial axis and risk of bleeding with elevated INR.  3.  Will reschedule her for Monday morning.  4.  Coumadin is on hold in spite of that INR went up and if the INR persists on being elevated might need to consider giving her vitamin K to reverse it.  Will also hold the  Lovenox.  One day prior to the procedure.  5.  Will follow with you.        Gino Leal MD  [facility]  Department of Cardiology  Date of Service: 02/04/2022  11:52 AM

## 2022-02-04 NOTE — SUBJECTIVE & OBJECTIVE
Interval History:     Review of Systems   Constitutional: Negative for activity change and appetite change.   HENT: Negative for congestion and dental problem.    Eyes: Negative for discharge and itching.   Respiratory: Negative for shortness of breath.    Cardiovascular: Negative for chest pain.   Gastrointestinal: Negative for abdominal distention and abdominal pain.   Endocrine: Negative for cold intolerance.   Genitourinary: Negative for difficulty urinating and dysuria.   Musculoskeletal: Negative for arthralgias and back pain.   Skin: Negative for color change.   Neurological: Negative for dizziness and facial asymmetry.   Hematological: Negative for adenopathy.   Psychiatric/Behavioral: Negative for agitation and behavioral problems.     Objective:     Vital Signs (Most Recent):  Temp: 98.6 °F (37 °C) (02/03/22 1700)  Pulse: 72 (02/03/22 1700)  Resp: 18 (02/03/22 1700)  BP: (!) 125/58 (02/03/22 1700)  SpO2: 97 % (02/03/22 1700) Vital Signs (24h Range):  Temp:  [98.5 °F (36.9 °C)-100.3 °F (37.9 °C)] 98.6 °F (37 °C)  Pulse:  [63-72] 72  Resp:  [15-30] 18  SpO2:  [95 %-98 %] 97 %  BP: ()/(53-58) 125/58     Weight: 56 kg (123 lb 7.3 oz)  Body mass index is 21.19 kg/m².    Intake/Output Summary (Last 24 hours) at 2/3/2022 1853  Last data filed at 2/3/2022 1800  Gross per 24 hour   Intake 840 ml   Output 1 ml   Net 839 ml      Physical Exam  Vitals and nursing note reviewed.   Constitutional:       General: She is not in acute distress.  HENT:      Head: Atraumatic.      Right Ear: External ear normal.      Left Ear: External ear normal.      Nose: Nose normal.      Mouth/Throat:      Mouth: Mucous membranes are moist.   Eyes:      General: No scleral icterus.     Extraocular Movements: EOM normal.   Cardiovascular:      Rate and Rhythm: Normal rate.   Pulmonary:      Effort: Pulmonary effort is normal.   Abdominal:      General: There is no distension.   Musculoskeletal:         General: No edema. Normal  range of motion.      Cervical back: Normal range of motion.   Skin:     General: Skin is warm.   Neurological:      Mental Status: She is alert and oriented to person, place, and time.   Psychiatric:         Mood and Affect: Mood and affect normal.         Behavior: Behavior normal.         Significant Labs:   All pertinent labs within the past 24 hours have been reviewed.  CBC:   Recent Labs   Lab 02/02/22  1117 02/03/22  0323   WBC 7.17 7.67   HGB 11.8* 10.2*   HCT 38.4 33.3*    239     CMP:   Recent Labs   Lab 02/02/22  1117 02/03/22  0323    134*   K 3.4* 5.1   CL 91* 94*   CO2 31* 31*   GLU 89 84   BUN 16 26*   CREATININE 4.1* 5.9*   CALCIUM 9.1 8.4*   PROT 7.6 6.2   ALBUMIN 3.8 3.1*   BILITOT 0.7 0.9   ALKPHOS 73 59   AST 21 18   ALT 26 22   ANIONGAP 16 9   EGFRNONAA 9.6* 6.2*       Significant Imaging: I have reviewed all pertinent imaging results/findings within the past 24 hours.

## 2022-02-04 NOTE — PROGRESS NOTES
[facility] Medicine  Progress Note    Patient Name: Tyra Isaac  MRN: [id number]  Patient Class: IP- Inpatient   Admission Date: 2/2/2022  Length of Stay: 2 days  Attending Physician: Ousmane Mcgregor MD  Primary Care Provider: Kalpesh Goel MD        Subjective:     Principal Problem:Chest pain        HPI:  No notes on file    Overview/Hospital Course:  02/03  Off Cardizem drip  Pt will have cardiac cath tomorrow    02/04  INR levels on higher range   Pt denies any issues         Interval History:     Review of Systems   Constitutional: Negative for activity change and appetite change.   HENT: Negative for congestion and dental problem.    Eyes: Negative for discharge and itching.   Respiratory: Negative for shortness of breath.    Cardiovascular: Negative for chest pain.   Gastrointestinal: Negative for abdominal distention and abdominal pain.   Endocrine: Negative for cold intolerance.   Genitourinary: Negative for difficulty urinating and dysuria.   Musculoskeletal: Negative for arthralgias and back pain.   Skin: Negative for color change.   Neurological: Negative for dizziness and facial asymmetry.   Hematological: Negative for adenopathy.   Psychiatric/Behavioral: Negative for agitation and behavioral problems.     Objective:     Vital Signs (Most Recent):  Temp: 97.8 °F (36.6 °C) (02/04/22 1500)  Pulse: 67 (02/04/22 1130)  Resp: (!) 28 (02/04/22 1130)  BP: (!) 126/59 (02/04/22 1500)  SpO2: 98 % (02/04/22 1130) Vital Signs (24h Range):  Temp:  [97.8 °F (36.6 °C)-99 °F (37.2 °C)] 97.8 °F (36.6 °C)  Pulse:  [62-73] 67  Resp:  [18-35] 28  SpO2:  [94 %-99 %] 98 %  BP: (111-138)/(54-69) 126/59     Weight: 56 kg (123 lb 7.3 oz)  Body mass index is 21.19 kg/m².    Intake/Output Summary (Last 24 hours) at 2/4/2022 172  Last data filed at 2/3/2022 2300  Gross per 24 hour   Intake 600 ml   Output --   Net 600 ml      Physical Exam  Vitals and nursing note reviewed.   Constitutional:        General: She is not in acute distress.  HENT:      Head: Atraumatic.      Right Ear: External ear normal.      Left Ear: External ear normal.      Nose: Nose normal.   Eyes:      Extraocular Movements: EOM normal.      Pupils: Pupils are equal, round, and reactive to light.   Cardiovascular:      Rate and Rhythm: Normal rate.   Pulmonary:      Effort: Pulmonary effort is normal.      Breath sounds: Normal breath sounds.   Abdominal:      General: Bowel sounds are normal.      Palpations: Abdomen is soft.   Musculoskeletal:         General: No edema. Normal range of motion.      Cervical back: Neck supple.   Skin:     General: Skin is warm.   Neurological:      Mental Status: She is alert and oriented to person, place, and time.   Psychiatric:         Mood and Affect: Mood and affect normal.         Significant Labs:   All pertinent labs within the past 24 hours have been reviewed.  CBC:   Recent Labs   Lab 02/03/22 0323 02/04/22  0628   WBC 7.67 5.36   HGB 10.2* 10.3*   HCT 33.3* 32.9*    239     CMP:   Recent Labs   Lab 02/03/22 0323 02/04/22  0628   * 137   K 5.1 5.1   CL 94* 96   CO2 31* 29   GLU 84 79   BUN 26* 41*   CREATININE 5.9* 8.4*   CALCIUM 8.4* 8.8   PROT 6.2 6.5   ALBUMIN 3.1* 3.2*   BILITOT 0.9 0.8   ALKPHOS 59 57   AST 18 19   ALT 22 21   ANIONGAP 9 12   EGFRNONAA 6.2* 4.0*       Significant Imaging: I have reviewed all pertinent imaging results/findings within the past 24 hours.      Assessment/Plan:      * Chest pain  Developed chest pain during HD session  Recent admission with same c/o and Lexiscan stress test was normal  Again getting admitted with Chest pain and A Fib  Pt scheduled for cardiac cath  On 02/07      Atrial fibrillation with RVR  Was on iv cardizem  gtt  Now on amiodarone PO as per Cardiology recommendations      Non-compliance  Prescribed Metoprolol when got admitted few days ago with A Fib RVR  Havent started medicine so far       ESRD on HD via are right upper  extremity AVF MWF  HD as per normal schedule      Benign hypertension with ESRD (end-stage renal disease)  Stable         VTE Risk Mitigation (From admission, onward)         Ordered     enoxaparin injection 30 mg  Every 24 hours (non-standard times)         02/04/22 1212     IP VTE HIGH RISK PATIENT  Once         02/02/22 1648     Place sequential compression device  Until discontinued         02/02/22 1648     Reason for No Pharmacological VTE Prophylaxis  Once        Question:  Reasons:  Answer:  Already adequately anticoagulated on oral Anticoagulants    02/02/22 1648                Discharge Planning   ILAN: 2/7/2022     Code Status: Full Code   Is the patient medically ready for discharge?: No    Reason for patient still in hospital (select all that apply): Treatment  Discharge Plan A: Home                  Ousmane Mcgregor MD  Department of Hospital Medicine   [facility]

## 2022-02-04 NOTE — PROGRESS NOTES
[facility] Medicine  Progress Note    Patient Name: Tyra Isaac  MRN: [id number]  Patient Class: IP- Inpatient   Admission Date: 2/2/2022  Length of Stay: 1 days  Attending Physician: Ousmane Mcgregor MD  Primary Care Provider: Kalpesh Goel MD        Subjective:     Principal Problem:Chest pain        HPI:  No notes on file    Overview/Hospital Course:  02/03  Off Cardizem drip  Pt will have cardiac cath tomorrow      Interval History:     Review of Systems   Constitutional: Negative for activity change and appetite change.   HENT: Negative for congestion and dental problem.    Eyes: Negative for discharge and itching.   Respiratory: Negative for shortness of breath.    Cardiovascular: Negative for chest pain.   Gastrointestinal: Negative for abdominal distention and abdominal pain.   Endocrine: Negative for cold intolerance.   Genitourinary: Negative for difficulty urinating and dysuria.   Musculoskeletal: Negative for arthralgias and back pain.   Skin: Negative for color change.   Neurological: Negative for dizziness and facial asymmetry.   Hematological: Negative for adenopathy.   Psychiatric/Behavioral: Negative for agitation and behavioral problems.     Objective:     Vital Signs (Most Recent):  Temp: 98.6 °F (37 °C) (02/03/22 1700)  Pulse: 72 (02/03/22 1700)  Resp: 18 (02/03/22 1700)  BP: (!) 125/58 (02/03/22 1700)  SpO2: 97 % (02/03/22 1700) Vital Signs (24h Range):  Temp:  [98.5 °F (36.9 °C)-100.3 °F (37.9 °C)] 98.6 °F (37 °C)  Pulse:  [63-72] 72  Resp:  [15-30] 18  SpO2:  [95 %-98 %] 97 %  BP: ()/(53-58) 125/58     Weight: 56 kg (123 lb 7.3 oz)  Body mass index is 21.19 kg/m².    Intake/Output Summary (Last 24 hours) at 2/3/2022 9013  Last data filed at 2/3/2022 1800  Gross per 24 hour   Intake 840 ml   Output 1 ml   Net 839 ml      Physical Exam  Vitals and nursing note reviewed.   Constitutional:       General: She is not in acute distress.  HENT:      Head: Atraumatic.       Right Ear: External ear normal.      Left Ear: External ear normal.      Nose: Nose normal.      Mouth/Throat:      Mouth: Mucous membranes are moist.   Eyes:      General: No scleral icterus.     Extraocular Movements: EOM normal.   Cardiovascular:      Rate and Rhythm: Normal rate.   Pulmonary:      Effort: Pulmonary effort is normal.   Abdominal:      General: There is no distension.   Musculoskeletal:         General: No edema. Normal range of motion.      Cervical back: Normal range of motion.   Skin:     General: Skin is warm.   Neurological:      Mental Status: She is alert and oriented to person, place, and time.   Psychiatric:         Mood and Affect: Mood and affect normal.         Behavior: Behavior normal.         Significant Labs:   All pertinent labs within the past 24 hours have been reviewed.  CBC:   Recent Labs   Lab 02/02/22  1117 02/03/22  0323   WBC 7.17 7.67   HGB 11.8* 10.2*   HCT 38.4 33.3*    239     CMP:   Recent Labs   Lab 02/02/22  1117 02/03/22  0323    134*   K 3.4* 5.1   CL 91* 94*   CO2 31* 31*   GLU 89 84   BUN 16 26*   CREATININE 4.1* 5.9*   CALCIUM 9.1 8.4*   PROT 7.6 6.2   ALBUMIN 3.8 3.1*   BILITOT 0.7 0.9   ALKPHOS 73 59   AST 21 18   ALT 26 22   ANIONGAP 16 9   EGFRNONAA 9.6* 6.2*       Significant Imaging: I have reviewed all pertinent imaging results/findings within the past 24 hours.      Assessment/Plan:      * Chest pain  Developed chest pain during HD session  Recent admission with same c/o and Lexiscan stress test was normal  Again getting admitted with Chest pain and A Fib  Pt scheduled for cardiac cath Tmrw AM       Atrial fibrillation with RVR  Was on iv cardizem  gtt  Now on amiodarone PO as per Cardiology recommendations      Non-compliance  Prescribed Metoprolol when got admitted few days ago with A Fib RVR  Havent started medicine so far       ESRD on HD via are right upper extremity AVF MWF  HD as per normal schedule      Benign hypertension with ESRD  (end-stage renal disease)  Stable       VTE Risk Mitigation (From admission, onward)         Ordered     enoxaparin injection 30 mg  Every 24 hours (non-standard times)         02/03/22 1205     IP VTE HIGH RISK PATIENT  Once         02/02/22 1648     Place sequential compression device  Until discontinued         02/02/22 1648     Reason for No Pharmacological VTE Prophylaxis  Once        Question:  Reasons:  Answer:  Already adequately anticoagulated on oral Anticoagulants    02/02/22 1648                Discharge Planning   ILAN:  Unknown    Code Status: Full Code   Is the patient medically ready for discharge?: No    Reason for patient still in hospital (select all that apply): Treatment                     Ousmane Mcgregor MD  Department of Hospital Medicine   [facility]

## 2022-02-04 NOTE — ASSESSMENT & PLAN NOTE
Prescribed Metoprolol when got admitted few days ago with A Fib RVR  Havent started medicine so far

## 2022-02-04 NOTE — PROGRESS NOTES
Late entry:  Called and spoke with the patient's son regarding her condition and recommendation for possible angiogram with PCI. Advised him on the risks including but not limited to bleeding, vascular damage, renal failure, stroke, myocardial infarction, emergency bypass surgery and death.  All questions have been answered to son's satisfaction, and he is agreeable for her to have the procedure.

## 2022-02-04 NOTE — PLAN OF CARE
[facility]  Initial Discharge Assessment       Primary Care Provider: Kalpesh Goel MD    Admission Diagnosis: Syncope, unspecified syncope type [R55]    Admission Date: 2/2/2022  Expected Discharge Date:     Discharge Barriers Identified: None     Initial assessment at bedside with patient  Patient reports living home alone with family that checks in daily. Patient is an established patient of [facility] on M,W,F at 0600. Patients rob Robertson provides transportation to dialysis and all MD appointments. Patient and caregiver anticipate discharge home when medically clear.      Payor: [employer] MEDICARE / Plan: [employer] MEDICARE HMO / Product Type: Capitation /     Extended Emergency Contact Information  Primary Emergency Contact: Marcelo Isaac  Address: [address] [place] [address] [place] [address] [facility]  Home Phone: [phone]  Relation: Son   needed? No    Discharge Plan A: Home  Discharge Plan B: Home      [id number] - [place] - [address]  [address]  [place] [address]  Phone: [phone] Fax: [phone]      Initial Assessment (most recent)     Adult Discharge Assessment - 02/04/22 1049        Discharge Assessment    Assessment Type Discharge Planning Assessment     Confirmed/corrected address, phone number and insurance Yes     Confirmed Demographics Correct on Facesheet     Source of Information patient     When was your last doctors appointment? --   Jan 2022    Reason For Admission Syncope     Lives With alone     Facility Arrived From: Home     Do you expect to return to your current living situation? Yes     Do you have help at home or someone to help you manage your care at home? Yes     Who are your caregiver(s) and their phone number(s)? Marcelo Isaac [phone]     Prior to hospitilization cognitive status: Alert/Oriented     Current cognitive status: Alert/Oriented     Walking or Climbing Stairs  Difficulty none     Dressing/Bathing Difficulty none     Do you have any problems with: Needs other help     Specify other help Marcelo Isaac [phone]     Home Layout Able to live on 1st floor     Equipment Currently Used at Home none     Readmission within 30 days? Yes     Patient currently being followed by outpatient case management? Yes     If yes, name of outpatient case management following: insurance company assigned oupatient case management     Do you currently have service(s) that help you manage your care at home? No     Do you take prescription medications? Yes     Do you have prescription coverage? Yes     Coverage [employer]     Do you have any problems affording any of your prescribed medications? No     Is the patient taking medications as prescribed? yes     Who is going to help you get home at discharge? Marcelo Isaac [phone]     How do you get to doctors appointments? family or friend will provide     Are you on dialysis? Yes     Dialysis Name and Scheduled days [facility]F 0600     Do you take coumadin? No     Discharge Plan A Home     Discharge Plan B Home     DME Needed Upon Discharge  none     Discharge Plan discussed with: Patient     Discharge Barriers Identified None

## 2022-02-04 NOTE — PLAN OF CARE
Problem: Adult Inpatient Plan of Care  Goal: Plan of Care Review  Outcome: Ongoing, Progressing  Goal: Patient-Specific Goal (Individualized)  Outcome: Ongoing, Progressing  Goal: Absence of Hospital-Acquired Illness or Injury  Outcome: Ongoing, Progressing  Goal: Optimal Comfort and Wellbeing  Outcome: Ongoing, Progressing  Goal: Readiness for Transition of Care  Outcome: Ongoing, Progressing     Problem: Fall Injury Risk  Goal: Absence of Fall and Fall-Related Injury  Outcome: Ongoing, Progressing     Problem: Dysrhythmia  Goal: Normalized Cardiac Rhythm  Outcome: Ongoing, Progressing     Problem: Chest Pain  Goal: Resolution of Chest Pain Symptoms  Outcome: Ongoing, Progressing

## 2022-02-04 NOTE — PLAN OF CARE
02/04/22 0744   Patient Assessment/Suction   Level of Consciousness (AVPU) alert   Respiratory Effort Normal;Unlabored   All Lung Fields Breath Sounds clear;diminished   PRE-TX-O2   O2 Device (Oxygen Therapy) room air   SpO2 98 %   Pulse Oximetry Type Continuous   $ Pulse Oximetry - Multiple Charge Pulse Oximetry - Multiple   Pulse 68   Resp (!) 27   Education   $ Education 15 min   Respiratory Evaluation   $ Care Plan Tech Time 15 min

## 2022-02-04 NOTE — SUBJECTIVE & OBJECTIVE
Interval History:     Review of Systems   Constitutional: Negative for activity change and appetite change.   HENT: Negative for congestion and dental problem.    Eyes: Negative for discharge and itching.   Respiratory: Negative for shortness of breath.    Cardiovascular: Negative for chest pain.   Gastrointestinal: Negative for abdominal distention and abdominal pain.   Endocrine: Negative for cold intolerance.   Genitourinary: Negative for difficulty urinating and dysuria.   Musculoskeletal: Negative for arthralgias and back pain.   Skin: Negative for color change.   Neurological: Negative for dizziness and facial asymmetry.   Hematological: Negative for adenopathy.   Psychiatric/Behavioral: Negative for agitation and behavioral problems.     Objective:     Vital Signs (Most Recent):  Temp: 97.8 °F (36.6 °C) (02/04/22 1500)  Pulse: 67 (02/04/22 1130)  Resp: (!) 28 (02/04/22 1130)  BP: (!) 126/59 (02/04/22 1500)  SpO2: 98 % (02/04/22 1130) Vital Signs (24h Range):  Temp:  [97.8 °F (36.6 °C)-99 °F (37.2 °C)] 97.8 °F (36.6 °C)  Pulse:  [62-73] 67  Resp:  [18-35] 28  SpO2:  [94 %-99 %] 98 %  BP: (111-138)/(54-69) 126/59     Weight: 56 kg (123 lb 7.3 oz)  Body mass index is 21.19 kg/m².    Intake/Output Summary (Last 24 hours) at 2/4/2022 1727  Last data filed at 2/3/2022 2300  Gross per 24 hour   Intake 600 ml   Output --   Net 600 ml      Physical Exam  Vitals and nursing note reviewed.   Constitutional:       General: She is not in acute distress.  HENT:      Head: Atraumatic.      Right Ear: External ear normal.      Left Ear: External ear normal.      Nose: Nose normal.   Eyes:      Extraocular Movements: EOM normal.      Pupils: Pupils are equal, round, and reactive to light.   Cardiovascular:      Rate and Rhythm: Normal rate.   Pulmonary:      Effort: Pulmonary effort is normal.      Breath sounds: Normal breath sounds.   Abdominal:      General: Bowel sounds are normal.      Palpations: Abdomen is soft.    Musculoskeletal:         General: No edema. Normal range of motion.      Cervical back: Neck supple.   Skin:     General: Skin is warm.   Neurological:      Mental Status: She is alert and oriented to person, place, and time.   Psychiatric:         Mood and Affect: Mood and affect normal.         Significant Labs:   All pertinent labs within the past 24 hours have been reviewed.  CBC:   Recent Labs   Lab 02/03/22  0323 02/04/22  0628   WBC 7.67 5.36   HGB 10.2* 10.3*   HCT 33.3* 32.9*    239     CMP:   Recent Labs   Lab 02/03/22  0323 02/04/22  0628   * 137   K 5.1 5.1   CL 94* 96   CO2 31* 29   GLU 84 79   BUN 26* 41*   CREATININE 5.9* 8.4*   CALCIUM 8.4* 8.8   PROT 6.2 6.5   ALBUMIN 3.1* 3.2*   BILITOT 0.9 0.8   ALKPHOS 59 57   AST 18 19   ALT 22 21   ANIONGAP 9 12   EGFRNONAA 6.2* 4.0*       Significant Imaging: I have reviewed all pertinent imaging results/findings within the past 24 hours.

## 2022-02-04 NOTE — PLAN OF CARE
"Patient reports going to dialysis on Wed, 2/2 and feeling short of breath. During dialysis treatment symptoms continued and patient came to [facility] ER for treatment.       02/04/22 1059   Readmission   Why were you hospitalized in the last 30 days? "short of breath"   Why were you readmitted? Alarmed about signs/symptoms   When you left the hospital how did you feel? "good"   When you left the hospital where did you go? Home Alone   Did patient/caregiver refused recommended DC plan? Yes  (Refused recommended walker)   Tell me about what happened between when you left the hospital and the day you returned. "I was ok until that morning. I was scheduled for dialysis and started feeling bad, short of breath"   When did you start not feeling well? Wed 2/2   Did you try to manage your symptoms your self? No   Did you call anyone? No   Why? Already at dialysis center   Did you try to see or did see a doctor or nurse before you came? Yes   Were you seen? Yes   Did you have  a follow-up appointment on discharge? No   Was this a planned readmission? No     "

## 2022-02-04 NOTE — ASSESSMENT & PLAN NOTE
Developed chest pain during HD session  Recent admission with same c/o and Lexiscan stress test was normal  Again getting admitted with Chest pain and A Fib  Pt scheduled for cardiac cath  On 02/07

## 2022-02-04 NOTE — HOSPITAL COURSE
Patient admitted after she had Chest pain during Dialysis session   She was found to be in A Fib RVR  Pt had Recent Hospital admission with A Fib and was discharged to home with  betablockers(Pt didn't took any meds as instructed)  This Time she was started on Amiodarone for A Fib and her condition came back to baseline  Pt had Left sided Cardiac cath which showed clean coronaries  Later pt was discharged to home

## 2022-02-04 NOTE — PLAN OF CARE
Problem: Adult Inpatient Plan of Care  Goal: Plan of Care Review  Outcome: Ongoing, Progressing  Goal: Patient-Specific Goal (Individualized)  Outcome: Ongoing, Progressing  Goal: Absence of Hospital-Acquired Illness or Injury  Outcome: Ongoing, Progressing  Goal: Optimal Comfort and Wellbeing  Outcome: Ongoing, Progressing  Goal: Readiness for Transition of Care  Outcome: Ongoing, Progressing     Problem: Fall Injury Risk  Goal: Absence of Fall and Fall-Related Injury  Outcome: Ongoing, Progressing     Problem: Dysrhythmia  Goal: Normalized Cardiac Rhythm  Outcome: Ongoing, Progressing     Problem: Dysrhythmia  Goal: Normalized Cardiac Rhythm  Outcome: Ongoing, Progressing     Problem: Chest Pain  Goal: Resolution of Chest Pain Symptoms  Outcome: Ongoing, Progressing

## 2022-02-04 NOTE — ASSESSMENT & PLAN NOTE
Developed chest pain during HD session  Recent admission with same c/o and Lexiscan stress test was normal  Again getting admitted with Chest pain and A Fib  Pt scheduled for cardiac cath Tmrw AM

## 2022-02-05 LAB
ALBUMIN SERPL BCP-MCNC: 3.1 G/DL (ref 3.5–5.2)
ALP SERPL-CCNC: 59 U/L (ref 55–135)
ALT SERPL W/O P-5'-P-CCNC: 21 U/L (ref 10–44)
ANION GAP SERPL CALC-SCNC: 11 MMOL/L (ref 8–16)
AST SERPL-CCNC: 16 U/L (ref 10–40)
BILIRUB SERPL-MCNC: 0.6 MG/DL (ref 0.1–1)
BUN SERPL-MCNC: 26 MG/DL (ref 8–23)
CALCIUM SERPL-MCNC: 8.6 MG/DL (ref 8.7–10.5)
CHLORIDE SERPL-SCNC: 99 MMOL/L (ref 95–110)
CO2 SERPL-SCNC: 27 MMOL/L (ref 23–29)
CREAT SERPL-MCNC: 5.4 MG/DL (ref 0.5–1.4)
ERYTHROCYTE [DISTWIDTH] IN BLOOD BY AUTOMATED COUNT: 13 % (ref 11.5–14.5)
EST. GFR  (AFRICAN AMERICAN): 7.9 ML/MIN/1.73 M^2
EST. GFR  (NON AFRICAN AMERICAN): 6.9 ML/MIN/1.73 M^2
GLUCOSE SERPL-MCNC: 79 MG/DL (ref 70–110)
HCT VFR BLD AUTO: 33.2 % (ref 37–48.5)
HGB BLD-MCNC: 10.2 G/DL (ref 12–16)
INR PPP: 1.5
MCH RBC QN AUTO: 30.4 PG (ref 27–31)
MCHC RBC AUTO-ENTMCNC: 30.7 G/DL (ref 32–36)
MCV RBC AUTO: 99 FL (ref 82–98)
PLATELET # BLD AUTO: 250 K/UL (ref 150–450)
PMV BLD AUTO: 9.7 FL (ref 9.2–12.9)
POTASSIUM SERPL-SCNC: 5.1 MMOL/L (ref 3.5–5.1)
PROT SERPL-MCNC: 6.4 G/DL (ref 6–8.4)
PROTHROMBIN TIME: 17.1 SEC (ref 11.4–13.7)
RBC # BLD AUTO: 3.35 M/UL (ref 4–5.4)
SODIUM SERPL-SCNC: 137 MMOL/L (ref 136–145)
WBC # BLD AUTO: 4.9 K/UL (ref 3.9–12.7)

## 2022-02-05 PROCEDURE — 80053 COMPREHEN METABOLIC PANEL: CPT | Performed by: INTERNAL MEDICINE

## 2022-02-05 PROCEDURE — 99900035 HC TECH TIME PER 15 MIN (STAT)

## 2022-02-05 PROCEDURE — 94761 N-INVAS EAR/PLS OXIMETRY MLT: CPT

## 2022-02-05 PROCEDURE — 86704 HEP B CORE ANTIBODY TOTAL: CPT | Performed by: INTERNAL MEDICINE

## 2022-02-05 PROCEDURE — 99900031 HC PATIENT EDUCATION (STAT)

## 2022-02-05 PROCEDURE — 85610 PROTHROMBIN TIME: CPT | Performed by: INTERNAL MEDICINE

## 2022-02-05 PROCEDURE — 86706 HEP B SURFACE ANTIBODY: CPT | Performed by: INTERNAL MEDICINE

## 2022-02-05 PROCEDURE — 36415 COLL VENOUS BLD VENIPUNCTURE: CPT | Performed by: INTERNAL MEDICINE

## 2022-02-05 PROCEDURE — 63600175 PHARM REV CODE 636 W HCPCS: Performed by: NURSE PRACTITIONER

## 2022-02-05 PROCEDURE — 99233 SBSQ HOSP IP/OBS HIGH 50: CPT | Mod: ,,, | Performed by: INTERNAL MEDICINE

## 2022-02-05 PROCEDURE — 87340 HEPATITIS B SURFACE AG IA: CPT | Performed by: INTERNAL MEDICINE

## 2022-02-05 PROCEDURE — 99233 PR SUBSEQUENT HOSPITAL CARE,LEVL III: ICD-10-PCS | Mod: ,,, | Performed by: INTERNAL MEDICINE

## 2022-02-05 PROCEDURE — 25000003 PHARM REV CODE 250: Performed by: INTERNAL MEDICINE

## 2022-02-05 PROCEDURE — 85027 COMPLETE CBC AUTOMATED: CPT | Performed by: INTERNAL MEDICINE

## 2022-02-05 PROCEDURE — 21000000 HC CCU ICU ROOM CHARGE

## 2022-02-05 RX ADMIN — MUPIROCIN: 20 OINTMENT TOPICAL at 08:02

## 2022-02-05 RX ADMIN — AMIODARONE HYDROCHLORIDE 200 MG: 200 TABLET ORAL at 09:02

## 2022-02-05 RX ADMIN — DOCUSATE SODIUM 100 MG: 100 CAPSULE, LIQUID FILLED ORAL at 08:02

## 2022-02-05 RX ADMIN — ASPIRIN 325 MG ORAL TABLET 325 MG: 325 PILL ORAL at 08:02

## 2022-02-05 RX ADMIN — DORZOLAMIDE HYDROCHLORIDE AND TIMOLOL MALEATE 1 DROP: 22.3; 6.8 SOLUTION/ DROPS OPHTHALMIC at 08:02

## 2022-02-05 RX ADMIN — CALCIUM ACETATE 667 MG: 667 CAPSULE ORAL at 04:02

## 2022-02-05 RX ADMIN — ENOXAPARIN SODIUM 30 MG: 30 INJECTION SUBCUTANEOUS at 02:02

## 2022-02-05 RX ADMIN — AMIODARONE HYDROCHLORIDE 200 MG: 200 TABLET ORAL at 02:02

## 2022-02-05 RX ADMIN — LATANOPROST 1 DROP: 50 SOLUTION OPHTHALMIC at 08:02

## 2022-02-05 RX ADMIN — ATORVASTATIN CALCIUM 40 MG: 40 TABLET, FILM COATED ORAL at 08:02

## 2022-02-05 RX ADMIN — AMIODARONE HYDROCHLORIDE 200 MG: 200 TABLET ORAL at 08:02

## 2022-02-05 NOTE — PROGRESS NOTES
Nephrology Consult Note        Patient Name: Tyra Isaac  MRN: [id number]    Patient Class: IP- Inpatient   Admission Date: 2/2/2022  Length of Stay: 3 days  Date of Service: 2/5/2022    Attending Physician: Ousmane Mcgregor MD  Primary Care Provider: Kalpesh Goel MD    Reason for Consult: esrd/anemia/htn/shpt/chest pain/afrvr    SUBJECTIVE:     HPI: 81F with ESRD on HD via RUE AVF on MWF, paroxysmal atrial fibrillation, on Coumadin, chronic anemia, hypertension, hyperlipidemia, anxiety/depression, presenting with chest pain and Afib RVR from dialysis, completed dialysis today. Patient discharged 5 days ago after similar presentation, underwent stress test which was negative for reversible ischemia, Lopressor 25 b.i.d. added for Afib. Patient received sublingual nitroglycerin today, however pain did not resolve until heart rate controlled, overall pain lasted for 45 minutes, she denied any palpitation nausea short of breath.    2/3 VSS, no new complains. HD in AM or can be dc from renal standpoint.  2/4 VSS, HD later today.  2/5 VSS, tolerated HD with 800ml UF yesterday. Plan for angiogram Monday.    Past Medical History:   Diagnosis Date    A-fib     Anxiety     Depression     Disorder of kidney and ureter     Encephalopathy acute 1/1/2018    End stage kidney disease 6/17/2017    Gout     Hyperlipidemia     Hypertension     Moderate episode of recurrent major depressive disorder 1/17/2018    Nephropathy hypertensive, stage 5 chronic kidney disease or end stage renal disease 6/17/2017    Obstructive pattern present on pulmonary function testing 7/28/2021    Shows moderate obstruction.    Osteopenia of multiple sites 3/9/2018    Based upon bone density measurements. Patient also has chronic kidney disease.    Stroke 11/2016     Past Surgical History:   Procedure Laterality Date    CARDIAC SURGERY      stents    WRIST SURGERY       Family History   Problem Relation Age of Onset    Heart disease Mother      Cancer Father      Social History     Tobacco Use    Smoking status: Never Smoker    Smokeless tobacco: Never Used   Substance Use Topics    Alcohol use: No    Drug use: No       Review of patient's allergies indicates:   Allergen Reactions    Cyclobenzaprine     Fish containing products Hives    Peanut     Tramadol Itching       Outpatient meds:  No current facility-administered medications on file prior to encounter.     Current Outpatient Medications on File Prior to Encounter   Medication Sig Dispense Refill    amLODIPine (NORVASC) 5 MG tablet Take 5 mg by mouth once daily.      atorvastatin (LIPITOR) 40 MG tablet Take 40 mg by mouth once daily.      b complex vitamins tablet Take 1 tablet by mouth once daily.      calcium acetate (PHOSLO) 667 mg capsule Take 667 mg by mouth Daily.  6    docusate sodium (COLACE) 100 MG capsule Take 100 mg by mouth once daily.       dorzolamide-timolol 2-0.5% (COSOPT) 22.3-6.8 mg/mL ophthalmic solution Place 1 drop into both eyes 2 (two) times daily.       latanoprost 0.005 % ophthalmic solution Place 1 drop into both eyes every evening.       lidocaine 5 % Crea Apply 1 application topically every Mon, Wed, Fri.       warfarin (COUMADIN) 2 MG tablet Take 2 mg by mouth every Mon, Wed, Fri.      warfarin (COUMADIN) 3 MG tablet Take 3 mg by mouth every Tuesday, Thursday, Saturday, Sunday.      metoprolol tartrate (LOPRESSOR) 25 MG tablet Take 1 tablet (25 mg total) by mouth 2 (two) times daily. 60 tablet 0    warfarin (COUMADIN) 1 MG tablet Take 1 tablet (1 mg total) by mouth Daily. Continue to take coumadin as you were prior to admission with intermittent INR checks and dose adjustments as needed 30 tablet 0       Scheduled meds:   sodium chloride 0.9%   Intravenous Once    amiodarone  200 mg Oral TID    aspirin  325 mg Oral Daily    atorvastatin  40 mg Oral Daily    calcium acetate(phosphat bind)  667 mg Oral Daily    docusate sodium  100 mg Oral  Daily    dorzolamide-timolol 2-0.5%  1 drop Both Eyes BID    enoxparin  30 mg Subcutaneous Q24H    latanoprost  1 drop Both Eyes QHS    mupirocin   Nasal BID       Infusions:      PRN meds:      Review of Systems:  Review of Systems   Constitutional: Negative for chills, fever, malaise/fatigue and weight loss.   HENT: Negative for hearing loss and nosebleeds.    Eyes: Negative for blurred vision, double vision and photophobia.   Respiratory: Negative for cough, shortness of breath and wheezing.    Cardiovascular: Negative for chest pain, palpitations and leg swelling.   Gastrointestinal: Negative for abdominal pain, constipation, diarrhea, heartburn, nausea and vomiting.   Genitourinary: Negative for dysuria, frequency and urgency.   Musculoskeletal: Negative for falls, joint pain and myalgias.   Skin: Negative for itching and rash.   Neurological: Negative for dizziness, speech change, focal weakness, loss of consciousness and headaches.   Endo/Heme/Allergies: Does not bruise/bleed easily.   Psychiatric/Behavioral: Negative for depression and substance abuse. The patient is not nervous/anxious.      OBJECTIVE:     Vital Signs and IO (Last 24H):  Temp:  [97.8 °F (36.6 °C)-99.5 °F (37.5 °C)]   Pulse:  [60-74]   Resp:  [16-28]   BP: ()/(47-69)   SpO2:  [96 %-98 %]   I/O last 3 completed shifts:  In: 740 [P.O.:240; Other:500]  Out: 1306 [Other:1306]    Wt Readings from Last 5 Encounters:   02/02/22 56 kg (123 lb 7.3 oz)   01/26/22 55.6 kg (122 lb 9.2 oz)   10/11/21 55.8 kg (123 lb)   06/25/21 57 kg (125 lb 10.6 oz)   06/07/21 57.2 kg (126 lb)     Physical Exam:  Physical Exam  Constitutional:       Appearance: She is well-developed. She is not diaphoretic.   HENT:      Head: Normocephalic and atraumatic.   Eyes:      General: No scleral icterus.     Pupils: Pupils are equal, round, and reactive to light.   Cardiovascular:      Rate and Rhythm: Normal rate and regular rhythm.   Pulmonary:      Effort:  Pulmonary effort is normal. No respiratory distress.      Breath sounds: No stridor.   Abdominal:      General: There is no distension.      Palpations: Abdomen is soft.   Musculoskeletal:         General: No deformity. Normal range of motion.      Cervical back: Neck supple.   Skin:     General: Skin is warm and dry.      Findings: No erythema or rash.   Neurological:      Mental Status: She is alert and oriented to person, place, and time.      Cranial Nerves: No cranial nerve deficit.   Psychiatric:         Behavior: Behavior normal.     Body mass index is 21.19 kg/m².    Laboratory:  Recent Labs   Lab 02/03/22 0323 02/04/22 0628 02/05/22  0637   * 137 137   K 5.1 5.1 5.1   CL 94* 96 99   CO2 31* 29 27   BUN 26* 41* 26*   CREATININE 5.9* 8.4* 5.4*   ESTGFRAFRICA 7.1* 4.7* 7.9*   EGFRNONAA 6.2* 4.0* 6.9*   GLU 84 79 79       Recent Labs   Lab 02/02/22 1117 02/02/22 1117 02/03/22 0323 02/04/22 0628 02/05/22  0637   CALCIUM 9.1   < > 8.4* 8.8 8.6*   ALBUMIN 3.8   < > 3.1* 3.2* 3.1*   PHOS  --   --  3.6  --   --    MG 2.1  --  2.0  --   --     < > = values in this interval not displayed.             No results for input(s): POCTGLUCOSE in the last 168 hours.    Recent Labs   Lab 01/26/22  1525   Hemoglobin A1C 5.3       Recent Labs   Lab 02/02/22 1117 02/02/22 1117 02/03/22 0323 02/04/22 0628 02/05/22  0637   WBC 7.17   < > 7.67 5.36 4.90   HGB 11.8*   < > 10.2* 10.3* 10.2*   HCT 38.4   < > 33.3* 32.9* 33.2*      < > 239 239 250   MCV 97   < > 99* 97 99*   MCHC 30.7*   < > 30.6* 31.3* 30.7*   MONO 13.7  1.0  --  13.6  1.0  --   --     < > = values in this interval not displayed.       Recent Labs   Lab 02/03/22  0323 02/04/22  0628 02/05/22  0637   BILITOT 0.9 0.8 0.6   PROT 6.2 6.5 6.4   ALBUMIN 3.1* 3.2* 3.1*   ALKPHOS 59 57 59   ALT 22 21 21   AST 18 19 16       Recent Labs   Lab 02/02/22  1605   Color, UA Yellow   Appearance, UA Clear   pH, UA >8.0 A   Specific Gravity, UA 1.005    Protein, UA 1+ A   Glucose, UA Negative   Ketones, UA Negative   Urobilinogen, UA Negative   Bilirubin (UA) Negative   Occult Blood UA Negative   Nitrite, UA Negative   RBC, UA 1   WBC, UA 1   Bacteria Negative   Hyaline Casts, UA 3 A       Recent Labs   Lab 12/13/19  1243   POC PH 7.51 H   POC PCO2 43   POC PO2 90   POC SATURATED O2 97.9       Microbiology Results (last 7 days)     ** No results found for the last 168 hours. **        ASSESSMENT/PLAN:     Active Hospital Problems    Diagnosis  POA    *Chest pain [R07.9]  Unknown    Non-compliance [Z91.19]  Not Applicable    Atrial fibrillation with RVR [I48.91]  Unknown    ESRD on HD via are right upper extremity AVF MWF [N18.6]  Yes     Chronic    Benign hypertension with ESRD (end-stage renal disease) [I12.0, N18.6]  Yes     BP slightly elevated1.21.15 BP elelvated. Took meds in [facility] will increase amlodipine to 10 mg per day. New prescription sent to the pharmacy. She will double the existing prescription. She will continue to monitor her blood pressures.        Resolved Hospital Problems    Diagnosis Date Resolved POA    Atrial fibrillation with RVR [I48.91] 02/03/2022 Unknown     ESRD on HD MWF via AVF  Continue current dialysis prescription.  Next HD per schedule or PRN.  Renal diet - low K, low phos.  No IVs or BP checks on access and/or non-dominant arm.    Anemia of CKD  Hgb and HCT are acceptable. Monitor.  Will provide BRAYAN and/or IV iron PRN.    MBD / Secondary HPT  Ca, phos, PTH and vitamin D levels are acceptable.   Phos binders, vitamin D analogues and calcimimetics as needed.    HTN  BP seem controlled.   Tolerate asymptomatic HTN up to -160.  Continue home meds.  Low sodium diet.    AF with RVR  Chest pain with EKG changes  Apprecaite cards plan for angiogram Monday.    Thank you for allowing us to participate in the care of your patient!   We will follow the patient and provide recommendations as needed.    Patient care time was spent  personally by me on the following activities:   · Obtaining a history.  · Examination of patient.  · Providing medical care at the patients bedside.  · Developing a treatment plan with patient or surrogate and bedside caregivers.  · Ordering and reviewing laboratory studies, radiographic studies, pulse oximetry.  · Ordering and performing treatments and interventions.  · Evaluation of patient's response to treatment.  · Discussions with consultants while on the unit and immediately available to the patient.  · Re-evaluation of the patient's condition.  · Documentation in the medical record.     Raleigh Yee MD    [facility]  [address] [place] [address]    [phone] - tel  [phone] - fax    2/5/2022

## 2022-02-05 NOTE — PROGRESS NOTES
Nephrology Consult Note - late entry for 2/4/2022        Patient Name: Tyra Isaac  MRN: [id number]    Patient Class: IP- Inpatient   Admission Date: 2/2/2022  Length of Stay: 2 days  Date of Service: 2/2/2022    Attending Physician: Ousmane Mcgregor MD  Primary Care Provider: Kalpesh Goel MD    Reason for Consult: esrd/anemia/htn/shpt/chest pain/afrvr    SUBJECTIVE:     HPI: 81F with ESRD on HD via RUE AVF on MWF, paroxysmal atrial fibrillation, on Coumadin, chronic anemia, hypertension, hyperlipidemia, anxiety/depression, presenting with chest pain and Afib RVR from dialysis, completed dialysis today. Patient discharged 5 days ago after similar presentation, underwent stress test which was negative for reversible ischemia, Lopressor 25 b.i.d. added for Afib. Patient received sublingual nitroglycerin today, however pain did not resolve until heart rate controlled, overall pain lasted for 45 minutes, she denied any palpitation nausea short of breath.    2/3 VSS, no new complains. HD in AM or can be dc from renal standpoint.  2/4 VSS, HD later today.    Past Medical History:   Diagnosis Date    A-fib     Anxiety     Depression     Disorder of kidney and ureter     Encephalopathy acute 1/1/2018    End stage kidney disease 6/17/2017    Gout     Hyperlipidemia     Hypertension     Moderate episode of recurrent major depressive disorder 1/17/2018    Nephropathy hypertensive, stage 5 chronic kidney disease or end stage renal disease 6/17/2017    Obstructive pattern present on pulmonary function testing 7/28/2021    Shows moderate obstruction.    Osteopenia of multiple sites 3/9/2018    Based upon bone density measurements. Patient also has chronic kidney disease.    Stroke 11/2016     Past Surgical History:   Procedure Laterality Date    CARDIAC SURGERY      stents    WRIST SURGERY       Family History   Problem Relation Age of Onset    Heart disease Mother     Cancer Father      Social History      Tobacco Use    Smoking status: Never Smoker    Smokeless tobacco: Never Used   Substance Use Topics    Alcohol use: No    Drug use: No       Review of patient's allergies indicates:   Allergen Reactions    Cyclobenzaprine     Fish containing products Hives    Peanut     Tramadol Itching       Outpatient meds:  No current facility-administered medications on file prior to encounter.     Current Outpatient Medications on File Prior to Encounter   Medication Sig Dispense Refill    amLODIPine (NORVASC) 5 MG tablet Take 5 mg by mouth once daily.      atorvastatin (LIPITOR) 40 MG tablet Take 40 mg by mouth once daily.      b complex vitamins tablet Take 1 tablet by mouth once daily.      calcium acetate (PHOSLO) 667 mg capsule Take 667 mg by mouth Daily.  6    docusate sodium (COLACE) 100 MG capsule Take 100 mg by mouth once daily.       dorzolamide-timolol 2-0.5% (COSOPT) 22.3-6.8 mg/mL ophthalmic solution Place 1 drop into both eyes 2 (two) times daily.       latanoprost 0.005 % ophthalmic solution Place 1 drop into both eyes every evening.       lidocaine 5 % Crea Apply 1 application topically every Mon, Wed, Fri.       warfarin (COUMADIN) 2 MG tablet Take 2 mg by mouth every Mon, Wed, Fri.      warfarin (COUMADIN) 3 MG tablet Take 3 mg by mouth every Tuesday, Thursday, Saturday, Sunday.      metoprolol tartrate (LOPRESSOR) 25 MG tablet Take 1 tablet (25 mg total) by mouth 2 (two) times daily. 60 tablet 0    warfarin (COUMADIN) 1 MG tablet Take 1 tablet (1 mg total) by mouth Daily. Continue to take coumadin as you were prior to admission with intermittent INR checks and dose adjustments as needed 30 tablet 0       Scheduled meds:   sodium chloride 0.9%   Intravenous Once    amiodarone  200 mg Oral TID    aspirin  325 mg Oral Daily    atorvastatin  40 mg Oral Daily    calcium acetate(phosphat bind)  667 mg Oral Daily    docusate sodium  100 mg Oral Daily    dorzolamide-timolol 2-0.5%  1  drop Both Eyes BID    enoxparin  30 mg Subcutaneous Q24H    latanoprost  1 drop Both Eyes QHS    mupirocin   Nasal BID       Infusions:      PRN meds:      Review of Systems:  Review of Systems   Constitutional: Negative for chills, fever, malaise/fatigue and weight loss.   HENT: Negative for hearing loss and nosebleeds.    Eyes: Negative for blurred vision, double vision and photophobia.   Respiratory: Negative for cough, shortness of breath and wheezing.    Cardiovascular: Negative for chest pain, palpitations and leg swelling.   Gastrointestinal: Negative for abdominal pain, constipation, diarrhea, heartburn, nausea and vomiting.   Genitourinary: Negative for dysuria, frequency and urgency.   Musculoskeletal: Negative for falls, joint pain and myalgias.   Skin: Negative for itching and rash.   Neurological: Negative for dizziness, speech change, focal weakness, loss of consciousness and headaches.   Endo/Heme/Allergies: Does not bruise/bleed easily.   Psychiatric/Behavioral: Negative for depression and substance abuse. The patient is not nervous/anxious.      OBJECTIVE:     Vital Signs and IO (Last 24H):  Temp:  [97.8 °F (36.6 °C)-99.5 °F (37.5 °C)]   Pulse:  [60-74]   Resp:  [16-28]   BP: ()/(47-69)   SpO2:  [96 %-98 %]   I/O last 3 completed shifts:  In: 740 [P.O.:240; Other:500]  Out: 1306 [Other:1306]    Wt Readings from Last 5 Encounters:   02/02/22 56 kg (123 lb 7.3 oz)   01/26/22 55.6 kg (122 lb 9.2 oz)   10/11/21 55.8 kg (123 lb)   06/25/21 57 kg (125 lb 10.6 oz)   06/07/21 57.2 kg (126 lb)     Physical Exam:  Physical Exam  Constitutional:       Appearance: She is well-developed. She is not diaphoretic.   HENT:      Head: Normocephalic and atraumatic.   Eyes:      General: No scleral icterus.     Pupils: Pupils are equal, round, and reactive to light.   Cardiovascular:      Rate and Rhythm: Normal rate and regular rhythm.   Pulmonary:      Effort: Pulmonary effort is normal. No respiratory  distress.      Breath sounds: No stridor.   Abdominal:      General: There is no distension.      Palpations: Abdomen is soft.   Musculoskeletal:         General: No deformity. Normal range of motion.      Cervical back: Neck supple.   Skin:     General: Skin is warm and dry.      Findings: No erythema or rash.   Neurological:      Mental Status: She is alert and oriented to person, place, and time.      Cranial Nerves: No cranial nerve deficit.   Psychiatric:         Behavior: Behavior normal.     Body mass index is 21.19 kg/m².    Laboratory:  Recent Labs   Lab 02/03/22 0323 02/04/22 0628 02/05/22  0637   * 137 137   K 5.1 5.1 5.1   CL 94* 96 99   CO2 31* 29 27   BUN 26* 41* 26*   CREATININE 5.9* 8.4* 5.4*   ESTGFRAFRICA 7.1* 4.7* 7.9*   EGFRNONAA 6.2* 4.0* 6.9*   GLU 84 79 79       Recent Labs   Lab 02/02/22 1117 02/02/22 1117 02/03/22 0323 02/04/22 0628 02/05/22  0637   CALCIUM 9.1   < > 8.4* 8.8 8.6*   ALBUMIN 3.8   < > 3.1* 3.2* 3.1*   PHOS  --   --  3.6  --   --    MG 2.1  --  2.0  --   --     < > = values in this interval not displayed.             No results for input(s): POCTGLUCOSE in the last 168 hours.    Recent Labs   Lab 01/26/22  1525   Hemoglobin A1C 5.3       Recent Labs   Lab 02/02/22 1117 02/02/22 1117 02/03/22 0323 02/04/22 0628 02/05/22  0637   WBC 7.17   < > 7.67 5.36 4.90   HGB 11.8*   < > 10.2* 10.3* 10.2*   HCT 38.4   < > 33.3* 32.9* 33.2*      < > 239 239 250   MCV 97   < > 99* 97 99*   MCHC 30.7*   < > 30.6* 31.3* 30.7*   MONO 13.7  1.0  --  13.6  1.0  --   --     < > = values in this interval not displayed.       Recent Labs   Lab 02/03/22  0323 02/04/22  0628 02/05/22  0637   BILITOT 0.9 0.8 0.6   PROT 6.2 6.5 6.4   ALBUMIN 3.1* 3.2* 3.1*   ALKPHOS 59 57 59   ALT 22 21 21   AST 18 19 16       Recent Labs   Lab 02/02/22  1605   Color, UA Yellow   Appearance, UA Clear   pH, UA >8.0 A   Specific Gravity, UA 1.005   Protein, UA 1+ A   Glucose, UA Negative   Ketones,  UA Negative   Urobilinogen, UA Negative   Bilirubin (UA) Negative   Occult Blood UA Negative   Nitrite, UA Negative   RBC, UA 1   WBC, UA 1   Bacteria Negative   Hyaline Casts, UA 3 A       Recent Labs   Lab 12/13/19  1243   POC PH 7.51 H   POC PCO2 43   POC PO2 90   POC SATURATED O2 97.9       Microbiology Results (last 7 days)     ** No results found for the last 168 hours. **        ASSESSMENT/PLAN:     Active Hospital Problems    Diagnosis  POA    *Chest pain [R07.9]  Unknown    Non-compliance [Z91.19]  Not Applicable    Atrial fibrillation with RVR [I48.91]  Unknown    ESRD on HD via are right upper extremity AVF MWF [N18.6]  Yes     Chronic    Benign hypertension with ESRD (end-stage renal disease) [I12.0, N18.6]  Yes     BP slightly elevated1.21.15 BP elelvated. Took meds in [facility] will increase amlodipine to 10 mg per day. New prescription sent to the pharmacy. She will double the existing prescription. She will continue to monitor her blood pressures.        Resolved Hospital Problems    Diagnosis Date Resolved POA    Atrial fibrillation with RVR [I48.91] 02/03/2022 Unknown     ESRD on HD MWF via AVF  Continue current dialysis prescription.  Next HD per schedule or PRN.  Renal diet - low K, low phos.  No IVs or BP checks on access and/or non-dominant arm.    Anemia of CKD  Hgb and HCT are acceptable. Monitor.  Will provide BRAYAN and/or IV iron PRN.    MBD / Secondary HPT  Ca, phos, PTH and vitamin D levels are acceptable.   Phos binders, vitamin D analogues and calcimimetics as needed.    HTN  BP seem controlled.   Tolerate asymptomatic HTN up to -160.  Continue home meds.  Low sodium diet.    AF with RVR  Chest pain  Awaiting cards input.    Thank you for allowing us to participate in the care of your patient!   We will follow the patient and provide recommendations as needed.    Patient care time was spent personally by me on the following activities:   · Obtaining a history.  · Examination of  patient.  · Providing medical care at the patients bedside.  · Developing a treatment plan with patient or surrogate and bedside caregivers.  · Ordering and reviewing laboratory studies, radiographic studies, pulse oximetry.  · Ordering and performing treatments and interventions.  · Evaluation of patient's response to treatment.  · Discussions with consultants while on the unit and immediately available to the patient.  · Re-evaluation of the patient's condition.  · Documentation in the medical record.     Raleigh Yee MD    [facility]  [address] [place] [id number] [phone] - tel  [phone] - fax    2/4/2022

## 2022-02-05 NOTE — PLAN OF CARE
Problem: Adult Inpatient Plan of Care  Goal: Plan of Care Review  Outcome: Ongoing, Progressing  Goal: Patient-Specific Goal (Individualized)  Outcome: Ongoing, Progressing  Goal: Absence of Hospital-Acquired Illness or Injury  Outcome: Ongoing, Progressing  Goal: Optimal Comfort and Wellbeing  Outcome: Ongoing, Progressing  Goal: Readiness for Transition of Care  Outcome: Ongoing, Progressing     Problem: Fall Injury Risk  Goal: Absence of Fall and Fall-Related Injury  Outcome: Ongoing, Progressing     Problem: Dysrhythmia  Goal: Normalized Cardiac Rhythm  Outcome: Ongoing, Progressing     Problem: Chest Pain  Goal: Resolution of Chest Pain Symptoms  Outcome: Ongoing, Progressing     Problem: Device-Related Complication Risk (Hemodialysis)  Goal: Safe, Effective Therapy Delivery  Outcome: Ongoing, Progressing     Problem: Hemodynamic Instability (Hemodialysis)  Goal: Effective Tissue Perfusion  Outcome: Ongoing, Progressing     Problem: Infection (Hemodialysis)  Goal: Absence of Infection Signs and Symptoms  Outcome: Ongoing, Progressing

## 2022-02-05 NOTE — PROGRESS NOTES
[facility]  Department of Cardiology  Progress Note    PATIENT NAME: Tyra Isaac  MRN: [id number]  TODAY'S DATE: 02/05/2022  ADMIT DATE: 2/2/2022    SUBJECTIVE     PRINCIPLE PROBLEM: Chest pain    INTERVAL HISTORY:    2/5/2022   Patient seen resting in bed with no acute distress.  No chest pain or shortness of breath noted.  Her INR is down to 1.5 today.    2/4/22  Patient is comfortable today denies any chest pain or tightness or heaviness.  Patient was brought to the cardiac cath lab and was on the table done it was realized that her INR was elevated and patient was taken off the table.    Review of patient's allergies indicates:   Allergen Reactions    Cyclobenzaprine     Fish containing products Hives    Peanut     Tramadol Itching       REVIEW OF SYSTEMS  CARDIOVASCULAR: No recent chest pain, palpitations, arm, neck, or jaw pain  RESPIRATORY: No recent fever, cough chills, SOB or congestion  : No blood in the urine  GI: No Nausea, vomiting, constipation, diarrhea, blood, or reflux.  MUSCULOSKELETAL: No myalgias  NEURO: No lightheadedness or dizziness  EYES: No Double vision, blurry, vision or headache     OBJECTIVE     VITAL SIGNS (Most Recent)  Temp: 98.5 °F (36.9 °C) (02/05/22 1200)  Pulse: 60 (02/05/22 0340)  Resp: 19 (02/05/22 0340)  BP: (!) 111/58 (02/05/22 1200)  SpO2: 96 % (02/05/22 0540)    VENTILATION STATUS  Resp: 19 (02/05/22 0340)  SpO2: 96 % (02/05/22 0540)       I & O (Last 24H):    Intake/Output Summary (Last 24 hours) at 2/5/2022 1322  Last data filed at 2/4/2022 2300  Gross per 24 hour   Intake 500 ml   Output 1306 ml   Net -806 ml       WEIGHTS  Wt Readings from Last 3 Encounters:   02/02/22 1640 56 kg (123 lb 7.3 oz)   02/02/22 1055 56.7 kg (125 lb)   01/26/22 1604 55.6 kg (122 lb 9.2 oz)   01/26/22 1052 55.8 kg (123 lb)   10/11/21 1107 55.8 kg (123 lb)       PHYSICAL EXAM  CONSTITUTIONAL: Well built, well nourished in no apparent distress  NECK: no carotid bruit, no  JVD  LUNGS: CTA  CHEST WALL: no tenderness  HEART: regular rate and rhythm, S1, S2 normal, soft systolic murmur  ABDOMEN: soft, non-tender; bowel sounds normal; no masses,  no organomegaly  EXTREMITIES: Extremities normal, no edema  NEURO: AAO X 3    SCHEDULED MEDS:   sodium chloride 0.9%   Intravenous Once    amiodarone  200 mg Oral TID    aspirin  325 mg Oral Daily    atorvastatin  40 mg Oral Daily    calcium acetate(phosphat bind)  667 mg Oral Daily    docusate sodium  100 mg Oral Daily    dorzolamide-timolol 2-0.5%  1 drop Both Eyes BID    enoxparin  30 mg Subcutaneous Q24H    latanoprost  1 drop Both Eyes QHS    mupirocin   Nasal BID       CONTINUOUS INFUSIONS:    PRN MEDS:acetaminophen, melatonin, nitroGLYCERIN, ondansetron, ondansetron, polyethylene glycol, sodium chloride 0.9%, sodium chloride 0.9%    LABS AND DIAGNOSTICS     CBC LAST 3 DAYS  Recent Labs   Lab 02/02/22  1117 02/02/22  1117 02/03/22 0323 02/04/22  0628 02/05/22  0637   WBC 7.17   < > 7.67 5.36 4.90   RBC 3.97*   < > 3.37* 3.40* 3.35*   HGB 11.8*   < > 10.2* 10.3* 10.2*   HCT 38.4   < > 33.3* 32.9* 33.2*   MCV 97   < > 99* 97 99*   MCH 29.7   < > 30.3 30.3 30.4   MCHC 30.7*   < > 30.6* 31.3* 30.7*   RDW 13.2   < > 13.2 13.0 13.0      < > 239 239 250   MPV 9.4   < > 9.7 9.5 9.7   GRAN 39.9  2.9  --  61.4  4.7  --   --    LYMPH 35.3  2.5  --  16.4*  1.3  --   --    MONO 13.7  1.0  --  13.6  1.0  --   --    BASO 0.04  --  0.02  --   --    NRBC 0  --  0  --   --     < > = values in this interval not displayed.       COAGULATION LAST 3 DAYS  Recent Labs   Lab 02/02/22  1117 02/03/22  0323 02/05/22  0637   LABPT 17.9* 19.9* 17.1*   INR 1.6 1.8 1.5   APTT 30.0 31.2  --        CHEMISTRY LAST 3 DAYS  Recent Labs   Lab 02/02/22  1117 02/02/22  1117 02/03/22  0323 02/04/22  0628 02/05/22  0637      < > 134* 137 137   K 3.4*   < > 5.1 5.1 5.1   CL 91*   < > 94* 96 99   CO2 31*   < > 31* 29 27   ANIONGAP 16   < > 9 12 11   BUN  16   < > 26* 41* 26*   CREATININE 4.1*   < > 5.9* 8.4* 5.4*   GLU 89   < > 84 79 79   CALCIUM 9.1   < > 8.4* 8.8 8.6*   MG 2.1  --  2.0  --   --    ALBUMIN 3.8   < > 3.1* 3.2* 3.1*   PROT 7.6   < > 6.2 6.5 6.4   ALKPHOS 73   < > 59 57 59   ALT 26   < > 22 21 21   AST 21   < > 18 19 16   BILITOT 0.7   < > 0.9 0.8 0.6    < > = values in this interval not displayed.       CARDIAC PROFILE LAST 3 DAYS  Recent Labs   Lab 02/02/22  1117 02/02/22  1701 02/02/22  2213 02/02/22  2214   *  --   --   --      --   --   --    CPKMB 2.3 2.8 2.0  --    TROPONINI 0.045* 0.068*  --  0.068*       ENDOCRINE LAST 3 DAYS  No results for input(s): TSH, PROCAL in the last 168 hours.    LAST ARTERIAL BLOOD GAS  ABG  No results for input(s): PH, PO2, PCO2, HCO3, BE in the last 168 hours.    LAST 7 DAYS MICROBIOLOGY   Microbiology Results (last 7 days)     ** No results found for the last 168 hours. **          MOST RECENT IMAGING  Cardiac catheterization  Aborted invasive cardiology procedure- see Procedure Log link for details.      LASTECHO  Results for orders placed during the hospital encounter of 01/26/22    Echo    Interpretation Summary  · The left ventricle is normal in size with mild concentric hypertrophy and normal systolic function.  · The estimated ejection fraction is 65%.  · Grade II left ventricular diastolic dysfunction.  · Atrial fibrillation not observed.  · Normal right ventricular size with normal right ventricular systolic function.  · Moderate to severe left atrial enlargement.  · Mild right atrial enlargement.  · Mild mitral regurgitation.  · Mild to moderate tricuspid regurgitation.  · Normal central venous pressure (3 mmHg).  · The estimated PA systolic pressure is 36 mmHg.  · Mildly elevated gradient accross mitral valve of around 6 mmHg at HR of 64 bpm. MVA by pressure half time is normal, however this can be inaccurate.      CURRENT/PREVIOUS VISIT EKG  Results for orders placed or performed during  the hospital encounter of 02/02/22   EKG 12-LEAD    Collection Time: 02/03/22 12:22 PM    Narrative    Test Reason : R07.9,    Vent. Rate : 063 BPM     Atrial Rate : 063 BPM     P-R Int : 166 ms          QRS Dur : 072 ms      QT Int : 484 ms       P-R-T Axes : 091 032 060 degrees     QTc Int : 495 ms    Normal sinus rhythm  Septal infarct ,age undetermined  Abnormal ECG  When compared with ECG of 02-FEB-2022 11:04,  Significant changes have occurred    Referred By: KUNAL   SELF           Confirmed By:        ASSESSMENT/PLAN:     Active Hospital Problems    Diagnosis    *Chest pain    Non-compliance    Atrial fibrillation with RVR    ESRD on HD via are right upper extremity AVF MWF    Benign hypertension with ESRD (end-stage renal disease)     BP slightly elevated1.21.15 BP elelvated. Took meds in [facility]I will increase amlodipine to 10 mg per day. New prescription sent to the pharmacy. She will double the existing prescription. She will continue to monitor her blood pressures.         ASSESSMENT & PLAN:   1.  Paroxysmal atrial fibrillation  2.  Significant diffuse ST depression with AFib with RVR  3.  Chest pain during hemodialysis  4.  Atherosclerotic coronary artery disease status post stent placement in the proximal left circumflex artery  5.  End-stage renal disease on hemodialysis  6.  Essential hypertension  7.  Anemia of chronic disease.  8.  History of syncope      RECOMMENDATIONS:    1. Continue to hold off on warfarin.  Patient will be receiving Lovenox daily with her last dose being tomorrow.   2. Plan for angiogram with possible PCI on Monday.   3. Dialysis per Dr. Yee.   4. Will follow.       Jacquie Carter NP  [facility]  Department of Cardiology  Date of Service: 02/05/2022  11:52 AM    I have seen the patient, reviewed the Nurse Practitioner's history and physical, assessment, plan, progress note and consultation note. I have personally interviewed and examined the patient at  bedside and agree with the findings.       Gino Leal MD  Department of Cardiology  [facility]

## 2022-02-05 NOTE — PLAN OF CARE
02/05/22 1051   Patient Assessment/Suction   Level of Consciousness (AVPU) alert   Respiratory Effort Normal;Unlabored   All Lung Fields Breath Sounds clear;diminished   PRE-TX-O2   O2 Device (Oxygen Therapy) room air   SpO2 98 %   Pulse Oximetry Type Continuous   $ Pulse Oximetry - Multiple Charge Pulse Oximetry - Multiple   Pulse 60   Resp (!) 29   Education   $ Education 15 min   Respiratory Evaluation   $ Care Plan Tech Time 15 min

## 2022-02-06 LAB
ALBUMIN SERPL BCP-MCNC: 3 G/DL (ref 3.5–5.2)
ALP SERPL-CCNC: 55 U/L (ref 55–135)
ALT SERPL W/O P-5'-P-CCNC: 18 U/L (ref 10–44)
ANION GAP SERPL CALC-SCNC: 12 MMOL/L (ref 8–16)
AST SERPL-CCNC: 15 U/L (ref 10–40)
BILIRUB SERPL-MCNC: 0.6 MG/DL (ref 0.1–1)
BUN SERPL-MCNC: 39 MG/DL (ref 8–23)
CALCIUM SERPL-MCNC: 8.6 MG/DL (ref 8.7–10.5)
CHLORIDE SERPL-SCNC: 100 MMOL/L (ref 95–110)
CO2 SERPL-SCNC: 25 MMOL/L (ref 23–29)
CREAT SERPL-MCNC: 7.3 MG/DL (ref 0.5–1.4)
ERYTHROCYTE [DISTWIDTH] IN BLOOD BY AUTOMATED COUNT: 13 % (ref 11.5–14.5)
EST. GFR  (AFRICAN AMERICAN): 5.5 ML/MIN/1.73 M^2
EST. GFR  (NON AFRICAN AMERICAN): 4.8 ML/MIN/1.73 M^2
GLUCOSE SERPL-MCNC: 86 MG/DL (ref 70–110)
HCT VFR BLD AUTO: 32 % (ref 37–48.5)
HGB BLD-MCNC: 9.9 G/DL (ref 12–16)
INR PPP: 1.3
MCH RBC QN AUTO: 30.2 PG (ref 27–31)
MCHC RBC AUTO-ENTMCNC: 30.9 G/DL (ref 32–36)
MCV RBC AUTO: 98 FL (ref 82–98)
PLATELET # BLD AUTO: 249 K/UL (ref 150–450)
PMV BLD AUTO: 9.6 FL (ref 9.2–12.9)
POTASSIUM SERPL-SCNC: 5 MMOL/L (ref 3.5–5.1)
PROT SERPL-MCNC: 6.3 G/DL (ref 6–8.4)
PROTHROMBIN TIME: 15.2 SEC (ref 11.4–13.7)
RBC # BLD AUTO: 3.28 M/UL (ref 4–5.4)
SODIUM SERPL-SCNC: 137 MMOL/L (ref 136–145)
WBC # BLD AUTO: 5.15 K/UL (ref 3.9–12.7)

## 2022-02-06 PROCEDURE — 99233 PR SUBSEQUENT HOSPITAL CARE,LEVL III: ICD-10-PCS | Mod: ,,, | Performed by: INTERNAL MEDICINE

## 2022-02-06 PROCEDURE — 85610 PROTHROMBIN TIME: CPT | Performed by: INTERNAL MEDICINE

## 2022-02-06 PROCEDURE — 94761 N-INVAS EAR/PLS OXIMETRY MLT: CPT

## 2022-02-06 PROCEDURE — 25000003 PHARM REV CODE 250: Performed by: INTERNAL MEDICINE

## 2022-02-06 PROCEDURE — 21000000 HC CCU ICU ROOM CHARGE

## 2022-02-06 PROCEDURE — 36415 COLL VENOUS BLD VENIPUNCTURE: CPT | Performed by: INTERNAL MEDICINE

## 2022-02-06 PROCEDURE — 63600175 PHARM REV CODE 636 W HCPCS: Performed by: NURSE PRACTITIONER

## 2022-02-06 PROCEDURE — 80053 COMPREHEN METABOLIC PANEL: CPT | Performed by: INTERNAL MEDICINE

## 2022-02-06 PROCEDURE — 99900035 HC TECH TIME PER 15 MIN (STAT)

## 2022-02-06 PROCEDURE — 99900031 HC PATIENT EDUCATION (STAT)

## 2022-02-06 PROCEDURE — 99233 SBSQ HOSP IP/OBS HIGH 50: CPT | Mod: ,,, | Performed by: INTERNAL MEDICINE

## 2022-02-06 PROCEDURE — 85027 COMPLETE CBC AUTOMATED: CPT | Performed by: INTERNAL MEDICINE

## 2022-02-06 RX ORDER — DIPHENHYDRAMINE HCL 25 MG
50 CAPSULE ORAL
Status: DISCONTINUED | OUTPATIENT
Start: 2022-02-06 | End: 2022-02-07 | Stop reason: HOSPADM

## 2022-02-06 RX ADMIN — ASPIRIN 325 MG ORAL TABLET 325 MG: 325 PILL ORAL at 10:02

## 2022-02-06 RX ADMIN — DORZOLAMIDE HYDROCHLORIDE AND TIMOLOL MALEATE 1 DROP: 22.3; 6.8 SOLUTION/ DROPS OPHTHALMIC at 08:02

## 2022-02-06 RX ADMIN — LATANOPROST 1 DROP: 50 SOLUTION OPHTHALMIC at 08:02

## 2022-02-06 RX ADMIN — ATORVASTATIN CALCIUM 40 MG: 40 TABLET, FILM COATED ORAL at 08:02

## 2022-02-06 RX ADMIN — AMIODARONE HYDROCHLORIDE 200 MG: 200 TABLET ORAL at 10:02

## 2022-02-06 RX ADMIN — DORZOLAMIDE HYDROCHLORIDE AND TIMOLOL MALEATE 1 DROP: 22.3; 6.8 SOLUTION/ DROPS OPHTHALMIC at 10:02

## 2022-02-06 RX ADMIN — CALCIUM ACETATE 667 MG: 667 CAPSULE ORAL at 05:02

## 2022-02-06 RX ADMIN — ENOXAPARIN SODIUM 30 MG: 30 INJECTION SUBCUTANEOUS at 02:02

## 2022-02-06 RX ADMIN — MUPIROCIN: 20 OINTMENT TOPICAL at 10:02

## 2022-02-06 RX ADMIN — MUPIROCIN: 20 OINTMENT TOPICAL at 08:02

## 2022-02-06 RX ADMIN — AMIODARONE HYDROCHLORIDE 200 MG: 200 TABLET ORAL at 03:02

## 2022-02-06 RX ADMIN — AMIODARONE HYDROCHLORIDE 200 MG: 200 TABLET ORAL at 08:02

## 2022-02-06 NOTE — SUBJECTIVE & OBJECTIVE
Interval History:     Review of Systems   Constitutional: Negative for activity change and appetite change.   HENT: Negative for congestion and dental problem.    Eyes: Negative for discharge and itching.   Respiratory: Negative for shortness of breath.    Cardiovascular: Negative for chest pain.   Gastrointestinal: Negative for abdominal distention and abdominal pain.   Endocrine: Negative for cold intolerance.   Genitourinary: Negative for difficulty urinating and dysuria.   Musculoskeletal: Negative for arthralgias and back pain.   Skin: Negative for color change.   Neurological: Negative for dizziness and facial asymmetry.   Hematological: Negative for adenopathy.   Psychiatric/Behavioral: Negative for agitation and behavioral problems.     Objective:     Vital Signs (Most Recent):  Temp: 97.5 °F (36.4 °C) (02/05/22 1701)  Pulse: 60 (02/05/22 1447)  Resp: 19 (02/05/22 1447)  BP: 135/66 (02/05/22 1701)  SpO2: 96 % (02/05/22 1447) Vital Signs (24h Range):  Temp:  [97.5 °F (36.4 °C)-99.5 °F (37.5 °C)] 97.5 °F (36.4 °C)  Pulse:  [60-74] 60  Resp:  [16-29] 19  SpO2:  [96 %-98 %] 96 %  BP: ()/(47-71) 135/66     Weight: 56 kg (123 lb 7.3 oz)  Body mass index is 21.19 kg/m².    Intake/Output Summary (Last 24 hours) at 2/5/2022 1918  Last data filed at 2/5/2022 1700  Gross per 24 hour   Intake 680 ml   Output 1306 ml   Net -626 ml      Physical Exam  Vitals and nursing note reviewed.   Constitutional:       General: She is not in acute distress.  HENT:      Head: Atraumatic.      Right Ear: External ear normal.      Left Ear: External ear normal.      Nose: Nose normal.      Mouth/Throat:      Mouth: Mucous membranes are moist.   Eyes:      General: No scleral icterus.     Extraocular Movements: EOM normal.   Cardiovascular:      Rate and Rhythm: Normal rate.   Pulmonary:      Effort: Pulmonary effort is normal.   Abdominal:      General: There is no distension.   Musculoskeletal:         General: No edema. Normal  range of motion.      Cervical back: Normal range of motion.   Skin:     General: Skin is warm.   Neurological:      Mental Status: She is alert and oriented to person, place, and time.   Psychiatric:         Mood and Affect: Mood and affect normal.         Behavior: Behavior normal.         Significant Labs:   All pertinent labs within the past 24 hours have been reviewed.  CBC:   Recent Labs   Lab 02/04/22 0628 02/05/22  0637   WBC 5.36 4.90   HGB 10.3* 10.2*   HCT 32.9* 33.2*    250     CMP:   Recent Labs   Lab 02/04/22 0628 02/05/22  0637    137   K 5.1 5.1   CL 96 99   CO2 29 27   GLU 79 79   BUN 41* 26*   CREATININE 8.4* 5.4*   CALCIUM 8.8 8.6*   PROT 6.5 6.4   ALBUMIN 3.2* 3.1*   BILITOT 0.8 0.6   ALKPHOS 57 59   AST 19 16   ALT 21 21   ANIONGAP 12 11   EGFRNONAA 4.0* 6.9*       Significant Imaging: I have reviewed all pertinent imaging results/findings within the past 24 hours.

## 2022-02-06 NOTE — PROGRESS NOTES
[facility]  Department of Cardiology  Progress Note    PATIENT NAME: Tyra Isaac  MRN: [id number]  TODAY'S DATE: 02/06/2022  ADMIT DATE: 2/2/2022    SUBJECTIVE     PRINCIPLE PROBLEM: Chest pain    INTERVAL HISTORY:    2/6/2022   Patient seen resting in bed with no distress noted. No active chest pain reported. INR 1.3 today. Last dose of lovenox due today.       2/5/22  Patient seen resting in bed with no acute distress.  No chest pain or shortness of breath noted.  Her INR is down to 1.5 today.    2/4/22  Patient is comfortable today denies any chest pain or tightness or heaviness.  Patient was brought to the cardiac cath lab and was on the table done it was realized that her INR was elevated and patient was taken off the table.    Review of patient's allergies indicates:   Allergen Reactions    Cyclobenzaprine     Fish containing products Hives    Peanut     Tramadol Itching       REVIEW OF SYSTEMS  CARDIOVASCULAR: No recent chest pain, palpitations, arm, neck, or jaw pain  RESPIRATORY: No recent fever, cough chills, SOB or congestion  : No blood in the urine  GI: No Nausea, vomiting, constipation, diarrhea, blood, or reflux.  MUSCULOSKELETAL: No myalgias  NEURO: No lightheadedness or dizziness  EYES: No Double vision, blurry, vision or headache     OBJECTIVE     VITAL SIGNS (Most Recent)  Temp: 98.6 °F (37 °C) (02/06/22 1137)  Pulse: (!) 58 (02/06/22 1137)  Resp: 20 (02/06/22 1137)  BP: (!) 110/51 (02/06/22 1137)  SpO2: 100 % (02/06/22 1137)    VENTILATION STATUS  Resp: 20 (02/06/22 1137)  SpO2: 100 % (02/06/22 1137)       I & O (Last 24H):    Intake/Output Summary (Last 24 hours) at 2/6/2022 1300  Last data filed at 2/6/2022 0949  Gross per 24 hour   Intake 500 ml   Output --   Net 500 ml       WEIGHTS  Wt Readings from Last 3 Encounters:   02/02/22 1640 56 kg (123 lb 7.3 oz)   02/02/22 1055 56.7 kg (125 lb)   01/26/22 1604 55.6 kg (122 lb 9.2 oz)   01/26/22 1052 55.8 kg (123 lb)    10/11/21 1107 55.8 kg (123 lb)       PHYSICAL EXAM  CONSTITUTIONAL: Well built, well nourished in no apparent distress  NECK: no carotid bruit, no JVD  LUNGS: CTA  CHEST WALL: no tenderness  HEART: regular rate and rhythm, S1, S2 normal, soft systolic murmur  ABDOMEN: soft, non-tender; bowel sounds normal; no masses,  no organomegaly  EXTREMITIES: Extremities normal, no edema  NEURO: AAO X 3    SCHEDULED MEDS:   sodium chloride 0.9%   Intravenous Once    amiodarone  200 mg Oral TID    aspirin  325 mg Oral Daily    atorvastatin  40 mg Oral Daily    calcium acetate(phosphat bind)  667 mg Oral Daily    docusate sodium  100 mg Oral Daily    dorzolamide-timolol 2-0.5%  1 drop Both Eyes BID    enoxparin  30 mg Subcutaneous Q24H    latanoprost  1 drop Both Eyes QHS    mupirocin   Nasal BID       CONTINUOUS INFUSIONS:    PRN MEDS:acetaminophen, melatonin, nitroGLYCERIN, ondansetron, ondansetron, polyethylene glycol, sodium chloride 0.9%, sodium chloride 0.9%    LABS AND DIAGNOSTICS     CBC LAST 3 DAYS  Recent Labs   Lab 02/02/22  1117 02/02/22  1117 02/03/22  0323 02/03/22  0323 02/04/22  0628 02/05/22  0637 02/06/22  0617   WBC 7.17   < > 7.67   < > 5.36 4.90 5.15   RBC 3.97*   < > 3.37*   < > 3.40* 3.35* 3.28*   HGB 11.8*   < > 10.2*   < > 10.3* 10.2* 9.9*   HCT 38.4   < > 33.3*   < > 32.9* 33.2* 32.0*   MCV 97   < > 99*   < > 97 99* 98   MCH 29.7   < > 30.3   < > 30.3 30.4 30.2   MCHC 30.7*   < > 30.6*   < > 31.3* 30.7* 30.9*   RDW 13.2   < > 13.2   < > 13.0 13.0 13.0      < > 239   < > 239 250 249   MPV 9.4   < > 9.7   < > 9.5 9.7 9.6   GRAN 39.9  2.9  --  61.4  4.7  --   --   --   --    LYMPH 35.3  2.5  --  16.4*  1.3  --   --   --   --    MONO 13.7  1.0  --  13.6  1.0  --   --   --   --    BASO 0.04  --  0.02  --   --   --   --    NRBC 0  --  0  --   --   --   --     < > = values in this interval not displayed.       COAGULATION LAST 3 DAYS  Recent Labs   Lab 02/02/22  1117 02/02/22  1117  02/03/22 0323 02/05/22 0637 02/06/22  0617   LABPT 17.9*   < > 19.9* 17.1* 15.2*   INR 1.6   < > 1.8 1.5 1.3   APTT 30.0  --  31.2  --   --     < > = values in this interval not displayed.       CHEMISTRY LAST 3 DAYS  Recent Labs   Lab 02/02/22  1117 02/02/22  1117 02/03/22 0323 02/03/22 0323 02/04/22 0628 02/05/22 0637 02/06/22  0617      < > 134*   < > 137 137 137   K 3.4*   < > 5.1   < > 5.1 5.1 5.0   CL 91*   < > 94*   < > 96 99 100   CO2 31*   < > 31*   < > 29 27 25   ANIONGAP 16   < > 9   < > 12 11 12   BUN 16   < > 26*   < > 41* 26* 39*   CREATININE 4.1*   < > 5.9*   < > 8.4* 5.4* 7.3*   GLU 89   < > 84   < > 79 79 86   CALCIUM 9.1   < > 8.4*   < > 8.8 8.6* 8.6*   MG 2.1  --  2.0  --   --   --   --    ALBUMIN 3.8   < > 3.1*   < > 3.2* 3.1* 3.0*   PROT 7.6   < > 6.2   < > 6.5 6.4 6.3   ALKPHOS 73   < > 59   < > 57 59 55   ALT 26   < > 22   < > 21 21 18   AST 21   < > 18   < > 19 16 15   BILITOT 0.7   < > 0.9   < > 0.8 0.6 0.6    < > = values in this interval not displayed.       CARDIAC PROFILE LAST 3 DAYS  Recent Labs   Lab 02/02/22  1117 02/02/22  1701 02/02/22 2213 02/02/22 2214   *  --   --   --      --   --   --    CPKMB 2.3 2.8 2.0  --    TROPONINI 0.045* 0.068*  --  0.068*       ENDOCRINE LAST 3 DAYS  No results for input(s): TSH, PROCAL in the last 168 hours.    LAST ARTERIAL BLOOD GAS  ABG  No results for input(s): PH, PO2, PCO2, HCO3, BE in the last 168 hours.    LAST 7 DAYS MICROBIOLOGY   Microbiology Results (last 7 days)     ** No results found for the last 168 hours. **          MOST RECENT IMAGING  Cardiac catheterization  Aborted invasive cardiology procedure- see Procedure Log link for details.      LASTECHO  Results for orders placed during the hospital encounter of 01/26/22    Echo    Interpretation Summary  · The left ventricle is normal in size with mild concentric hypertrophy and normal systolic function.  · The estimated ejection fraction is 65%.  · Grade II  left ventricular diastolic dysfunction.  · Atrial fibrillation not observed.  · Normal right ventricular size with normal right ventricular systolic function.  · Moderate to severe left atrial enlargement.  · Mild right atrial enlargement.  · Mild mitral regurgitation.  · Mild to moderate tricuspid regurgitation.  · Normal central venous pressure (3 mmHg).  · The estimated PA systolic pressure is 36 mmHg.  · Mildly elevated gradient accross mitral valve of around 6 mmHg at HR of 64 bpm. MVA by pressure half time is normal, however this can be inaccurate.      CURRENT/PREVIOUS VISIT EKG  Results for orders placed or performed during the hospital encounter of 02/02/22   EKG 12-LEAD    Collection Time: 02/03/22 12:22 PM    Narrative    Test Reason : R07.9,    Vent. Rate : 063 BPM     Atrial Rate : 063 BPM     P-R Int : 166 ms          QRS Dur : 072 ms      QT Int : 484 ms       P-R-T Axes : 091 032 060 degrees     QTc Int : 495 ms    Normal sinus rhythm  Septal infarct ,age undetermined  Abnormal ECG  When compared with ECG of 02-FEB-2022 11:04,  Significant changes have occurred    Referred By: [email or web address]   SELF           Confirmed By:        ASSESSMENT/PLAN:     Active Hospital Problems    Diagnosis    *Chest pain    Non-compliance    Atrial fibrillation with RVR    ESRD on HD via are right upper extremity AVF MWF    Benign hypertension with ESRD (end-stage renal disease)     BP slightly elevated1.21.15 BP elelvated. Took meds in AMI will increase amlodipine to 10 mg per day. New prescription sent to the pharmacy. She will double the existing prescription. She will continue to monitor her blood pressures.         ASSESSMENT & PLAN:   1.  Paroxysmal atrial fibrillation  2.  Significant diffuse ST depression with AFib with RVR  3.  Chest pain during hemodialysis  4.  Atherosclerotic coronary artery disease status post stent placement in the proximal left circumflex artery  5.  End-stage renal disease on  hemodialysis  6.  Essential hypertension  7.  Anemia of chronic disease.  8.  History of syncope      RECOMMENDATIONS:    1. Patient is stable and prepared for [facility] tomorrow.   INR is down to 1.3.   2. Stop lovenox after today's dose.   3. Anticipate dialysis post angiogram tomorrow.   4. Will follow.       Jacquie Carter NP  [facility]  Department of Cardiology  Date of Service: 02/06/2022  11:52 AM    I have seen the patient, reviewed the Nurse Practitioner's history and physical, assessment, plan, progress note and consultation note. I have personally interviewed and examined the patient at bedside and agree with the findings.       Gino Leal MD  Department of Cardiology  [facility]

## 2022-02-06 NOTE — PLAN OF CARE
02/06/22 0803   Patient Assessment/Suction   Level of Consciousness (AVPU) alert   Respiratory Effort Normal;Unlabored   All Lung Fields Breath Sounds clear   PRE-TX-O2   O2 Device (Oxygen Therapy) room air   SpO2 100 %   Pulse Oximetry Type Continuous   $ Pulse Oximetry - Multiple Charge Pulse Oximetry - Multiple   Pulse 66   Resp (!) 31   /74   Education   $ Education 15 min   Respiratory Evaluation   $ Care Plan Tech Time 15 min

## 2022-02-06 NOTE — PROGRESS NOTES
[facility] Medicine  Progress Note    Patient Name: Tyra Isaac  MRN: [id number]  Patient Class: IP- Inpatient   Admission Date: 2/2/2022  Length of Stay: 3 days  Attending Physician: Ousmane Mcgregor MD  Primary Care Provider: Kalpesh Goel MD        Subjective:     Principal Problem:Chest pain        HPI:  No notes on file    Overview/Hospital Course:  02/03  Off Cardizem drip  Pt will have cardiac cath tomorrow    02/04  INR levels on higher range   Pt denies any issues     02/05  No new issues  Awaiting angiogram on 02/07      Interval History:     Review of Systems   Constitutional: Negative for activity change and appetite change.   HENT: Negative for congestion and dental problem.    Eyes: Negative for discharge and itching.   Respiratory: Negative for shortness of breath.    Cardiovascular: Negative for chest pain.   Gastrointestinal: Negative for abdominal distention and abdominal pain.   Endocrine: Negative for cold intolerance.   Genitourinary: Negative for difficulty urinating and dysuria.   Musculoskeletal: Negative for arthralgias and back pain.   Skin: Negative for color change.   Neurological: Negative for dizziness and facial asymmetry.   Hematological: Negative for adenopathy.   Psychiatric/Behavioral: Negative for agitation and behavioral problems.     Objective:     Vital Signs (Most Recent):  Temp: 97.5 °F (36.4 °C) (02/05/22 1701)  Pulse: 60 (02/05/22 1447)  Resp: 19 (02/05/22 1447)  BP: 135/66 (02/05/22 1701)  SpO2: 96 % (02/05/22 1447) Vital Signs (24h Range):  Temp:  [97.5 °F (36.4 °C)-99.5 °F (37.5 °C)] 97.5 °F (36.4 °C)  Pulse:  [60-74] 60  Resp:  [16-29] 19  SpO2:  [96 %-98 %] 96 %  BP: ()/(47-71) 135/66     Weight: 56 kg (123 lb 7.3 oz)  Body mass index is 21.19 kg/m².    Intake/Output Summary (Last 24 hours) at 2/5/2022 1918  Last data filed at 2/5/2022 1700  Gross per 24 hour   Intake 680 ml   Output 1306 ml   Net -626 ml      Physical Exam  Vitals and  nursing note reviewed.   Constitutional:       General: She is not in acute distress.  HENT:      Head: Atraumatic.      Right Ear: External ear normal.      Left Ear: External ear normal.      Nose: Nose normal.      Mouth/Throat:      Mouth: Mucous membranes are moist.   Eyes:      General: No scleral icterus.     Extraocular Movements: EOM normal.   Cardiovascular:      Rate and Rhythm: Normal rate.   Pulmonary:      Effort: Pulmonary effort is normal.   Abdominal:      General: There is no distension.   Musculoskeletal:         General: No edema. Normal range of motion.      Cervical back: Normal range of motion.   Skin:     General: Skin is warm.   Neurological:      Mental Status: She is alert and oriented to person, place, and time.   Psychiatric:         Mood and Affect: Mood and affect normal.         Behavior: Behavior normal.         Significant Labs:   All pertinent labs within the past 24 hours have been reviewed.  CBC:   Recent Labs   Lab 02/04/22 0628 02/05/22  0637   WBC 5.36 4.90   HGB 10.3* 10.2*   HCT 32.9* 33.2*    250     CMP:   Recent Labs   Lab 02/04/22 0628 02/05/22  0637    137   K 5.1 5.1   CL 96 99   CO2 29 27   GLU 79 79   BUN 41* 26*   CREATININE 8.4* 5.4*   CALCIUM 8.8 8.6*   PROT 6.5 6.4   ALBUMIN 3.2* 3.1*   BILITOT 0.8 0.6   ALKPHOS 57 59   AST 19 16   ALT 21 21   ANIONGAP 12 11   EGFRNONAA 4.0* 6.9*       Significant Imaging: I have reviewed all pertinent imaging results/findings within the past 24 hours.      Assessment/Plan:      * Chest pain  Developed chest pain during HD session  Recent admission with same c/o and Lexiscan stress test was normal  Again getting admitted with Chest pain and A Fib  Pt scheduled for cardiac cath  On 02/07      Atrial fibrillation with RVR  Was on iv cardizem  gtt  Now on amiodarone PO as per Cardiology recommendations      Non-compliance  Prescribed Metoprolol when got admitted few days ago with A Fib RVR  Havent started medicine so  far       ESRD on HD via are right upper extremity AVF MWF  HD as per normal schedule      Benign hypertension with ESRD (end-stage renal disease)  Stable         VTE Risk Mitigation (From admission, onward)         Ordered     enoxaparin injection 30 mg  Every 24 hours (non-standard times)         02/04/22 1212     IP VTE HIGH RISK PATIENT  Once         02/02/22 1648     Place sequential compression device  Until discontinued         02/02/22 1648     Reason for No Pharmacological VTE Prophylaxis  Once        Question:  Reasons:  Answer:  Already adequately anticoagulated on oral Anticoagulants    02/02/22 1648                Discharge Planning   ILAN:      Code Status: Full Code   Is the patient medically ready for discharge?: No    Reason for patient still in hospital (select all that apply): Treatment  Discharge Plan A: Home                  Ousmane Mcgregor MD  Department of Hospital Medicine   Formerly [facility], [facility]

## 2022-02-06 NOTE — PROGRESS NOTES
Nephrology Consult Note        Patient Name: Tyra Isaac  MRN: [id number]    Patient Class: IP- Inpatient   Admission Date: 2/2/2022  Length of Stay: 4 days  Date of Service: 2/6/2022    Attending Physician: Ousmane Mcgregor MD  Primary Care Provider: Kalpesh Goel MD    Reason for Consult: esrd/anemia/htn/shpt/chest pain/afrvr    SUBJECTIVE:     HPI: 81F with ESRD on HD via RUE AVF on MWF, paroxysmal atrial fibrillation, on Coumadin, chronic anemia, hypertension, hyperlipidemia, anxiety/depression, presenting with chest pain and Afib RVR from dialysis, completed dialysis today. Patient discharged 5 days ago after similar presentation, underwent stress test which was negative for reversible ischemia, Lopressor 25 b.i.d. added for Afib. Patient received sublingual nitroglycerin today, however pain did not resolve until heart rate controlled, overall pain lasted for 45 minutes, she denied any palpitation nausea short of breath.    2/3 VSS, no new complains. HD in AM or can be dc from renal standpoint.  2/4 VSS, HD later today.  2/5 VSS, tolerated HD with 800ml UF yesterday. Plan for angiogram Monday.  2/6 VSS, no new complains. HD in AM as well as angiogram by Cards.    Past Medical History:   Diagnosis Date    A-fib     Anxiety     Depression     Disorder of kidney and ureter     Encephalopathy acute 1/1/2018    End stage kidney disease 6/17/2017    Gout     Hyperlipidemia     Hypertension     Moderate episode of recurrent major depressive disorder 1/17/2018    Nephropathy hypertensive, stage 5 chronic kidney disease or end stage renal disease 6/17/2017    Obstructive pattern present on pulmonary function testing 7/28/2021    Shows moderate obstruction.    Osteopenia of multiple sites 3/9/2018    Based upon bone density measurements. Patient also has chronic kidney disease.    Stroke 11/2016     Past Surgical History:   Procedure Laterality Date    CARDIAC SURGERY      stents    WRIST SURGERY        Family History   Problem Relation Age of Onset    Heart disease Mother     Cancer Father      Social History     Tobacco Use    Smoking status: Never Smoker    Smokeless tobacco: Never Used   Substance Use Topics    Alcohol use: No    Drug use: No       Review of patient's allergies indicates:   Allergen Reactions    Cyclobenzaprine     Fish containing products Hives    Peanut     Tramadol Itching       Outpatient meds:  No current facility-administered medications on file prior to encounter.     Current Outpatient Medications on File Prior to Encounter   Medication Sig Dispense Refill    amLODIPine (NORVASC) 5 MG tablet Take 5 mg by mouth once daily.      atorvastatin (LIPITOR) 40 MG tablet Take 40 mg by mouth once daily.      b complex vitamins tablet Take 1 tablet by mouth once daily.      calcium acetate (PHOSLO) 667 mg capsule Take 667 mg by mouth Daily.  6    docusate sodium (COLACE) 100 MG capsule Take 100 mg by mouth once daily.       dorzolamide-timolol 2-0.5% (COSOPT) 22.3-6.8 mg/mL ophthalmic solution Place 1 drop into both eyes 2 (two) times daily.       latanoprost 0.005 % ophthalmic solution Place 1 drop into both eyes every evening.       lidocaine 5 % Crea Apply 1 application topically every Mon, Wed, Fri.       warfarin (COUMADIN) 2 MG tablet Take 2 mg by mouth every Mon, Wed, Fri.      warfarin (COUMADIN) 3 MG tablet Take 3 mg by mouth every Tuesday, Thursday, Saturday, Sunday.      metoprolol tartrate (LOPRESSOR) 25 MG tablet Take 1 tablet (25 mg total) by mouth 2 (two) times daily. 60 tablet 0    warfarin (COUMADIN) 1 MG tablet Take 1 tablet (1 mg total) by mouth Daily. Continue to take coumadin as you were prior to admission with intermittent INR checks and dose adjustments as needed 30 tablet 0       Scheduled meds:   sodium chloride 0.9%   Intravenous Once    amiodarone  200 mg Oral TID    aspirin  325 mg Oral Daily    atorvastatin  40 mg Oral Daily    calcium  acetate(phosphat bind)  667 mg Oral Daily    docusate sodium  100 mg Oral Daily    dorzolamide-timolol 2-0.5%  1 drop Both Eyes BID    enoxparin  30 mg Subcutaneous Q24H    latanoprost  1 drop Both Eyes QHS    mupirocin   Nasal BID       Infusions:      PRN meds:      Review of Systems:  Review of Systems   Constitutional: Negative for chills, fever, malaise/fatigue and weight loss.   HENT: Negative for hearing loss and nosebleeds.    Eyes: Negative for blurred vision, double vision and photophobia.   Respiratory: Negative for cough, shortness of breath and wheezing.    Cardiovascular: Negative for chest pain, palpitations and leg swelling.   Gastrointestinal: Negative for abdominal pain, constipation, diarrhea, heartburn, nausea and vomiting.   Genitourinary: Negative for dysuria, frequency and urgency.   Musculoskeletal: Negative for falls, joint pain and myalgias.   Skin: Negative for itching and rash.   Neurological: Negative for dizziness, speech change, focal weakness, loss of consciousness and headaches.   Endo/Heme/Allergies: Does not bruise/bleed easily.   Psychiatric/Behavioral: Negative for depression and substance abuse. The patient is not nervous/anxious.      OBJECTIVE:     Vital Signs and IO (Last 24H):  Temp:  [97.5 °F (36.4 °C)-98.6 °F (37 °C)]   Pulse:  [58-62]   Resp:  [18-29]   BP: (108-157)/(53-71)   SpO2:  [96 %-100 %]   I/O last 3 completed shifts:  In: 680 [P.O.:180; Other:500]  Out: 1306 [Other:1306]    Wt Readings from Last 5 Encounters:   02/02/22 56 kg (123 lb 7.3 oz)   01/26/22 55.6 kg (122 lb 9.2 oz)   10/11/21 55.8 kg (123 lb)   06/25/21 57 kg (125 lb 10.6 oz)   06/07/21 57.2 kg (126 lb)     Physical Exam:  Physical Exam  Constitutional:       Appearance: She is well-developed. She is not diaphoretic.   HENT:      Head: Normocephalic and atraumatic.   Eyes:      General: No scleral icterus.     Pupils: Pupils are equal, round, and reactive to light.   Cardiovascular:      Rate and  Rhythm: Normal rate and regular rhythm.   Pulmonary:      Effort: Pulmonary effort is normal. No respiratory distress.      Breath sounds: No stridor.   Abdominal:      General: There is no distension.      Palpations: Abdomen is soft.   Musculoskeletal:         General: No deformity. Normal range of motion.      Cervical back: Neck supple.   Skin:     General: Skin is warm and dry.      Findings: No erythema or rash.   Neurological:      Mental Status: She is alert and oriented to person, place, and time.      Cranial Nerves: No cranial nerve deficit.   Psychiatric:         Behavior: Behavior normal.     Body mass index is 21.19 kg/m².    Laboratory:  Recent Labs   Lab 02/04/22 0628 02/05/22 0637 02/06/22  0617    137 137   K 5.1 5.1 5.0   CL 96 99 100   CO2 29 27 25   BUN 41* 26* 39*   CREATININE 8.4* 5.4* 7.3*   ESTGFRAFRICA 4.7* 7.9* 5.5*   EGFRNONAA 4.0* 6.9* 4.8*   GLU 79 79 86       Recent Labs   Lab 02/02/22 1117 02/02/22 1117 02/03/22 0323 02/03/22 0323 02/04/22 0628 02/05/22 0637 02/06/22  0617   CALCIUM 9.1   < > 8.4*   < > 8.8 8.6* 8.6*   ALBUMIN 3.8   < > 3.1*   < > 3.2* 3.1* 3.0*   PHOS  --   --  3.6  --   --   --   --    MG 2.1  --  2.0  --   --   --   --     < > = values in this interval not displayed.             No results for input(s): POCTGLUCOSE in the last 168 hours.    Recent Labs   Lab 01/26/22  1525   Hemoglobin A1C 5.3       Recent Labs   Lab 02/02/22 1117 02/02/22 1117 02/03/22 0323 02/03/22 0323 02/04/22 0628 02/05/22 0637 02/06/22  0617   WBC 7.17   < > 7.67   < > 5.36 4.90 5.15   HGB 11.8*   < > 10.2*   < > 10.3* 10.2* 9.9*   HCT 38.4   < > 33.3*   < > 32.9* 33.2* 32.0*      < > 239   < > 239 250 249   MCV 97   < > 99*   < > 97 99* 98   MCHC 30.7*   < > 30.6*   < > 31.3* 30.7* 30.9*   MONO 13.7  1.0  --  13.6  1.0  --   --   --   --     < > = values in this interval not displayed.       Recent Labs   Lab 02/04/22  0628 02/05/22  0637 02/06/22  0617   BILITOT  0.8 0.6 0.6   PROT 6.5 6.4 6.3   ALBUMIN 3.2* 3.1* 3.0*   ALKPHOS 57 59 55   ALT 21 21 18   AST 19 16 15       Recent Labs   Lab 02/02/22  1605   Color, UA Yellow   Appearance, UA Clear   pH, UA >8.0 A   Specific Gravity, UA 1.005   Protein, UA 1+ A   Glucose, UA Negative   Ketones, UA Negative   Urobilinogen, UA Negative   Bilirubin (UA) Negative   Occult Blood UA Negative   Nitrite, UA Negative   RBC, UA 1   WBC, UA 1   Bacteria Negative   Hyaline Casts, UA 3 A       Recent Labs   Lab 12/13/19  1243   POC PH 7.51 H   POC PCO2 43   POC PO2 90   POC SATURATED O2 97.9       Microbiology Results (last 7 days)     ** No results found for the last 168 hours. **        ASSESSMENT/PLAN:     Active Hospital Problems    Diagnosis  POA    *Chest pain [R07.9]  Unknown    Non-compliance [Z91.19]  Not Applicable    Atrial fibrillation with RVR [I48.91]  Unknown    ESRD on HD via are right upper extremity AVF MWF [N18.6]  Yes     Chronic    Benign hypertension with ESRD (end-stage renal disease) [I12.0, N18.6]  Yes     BP slightly elevated1.21.15 BP elelvated. Took meds in [facility] will increase amlodipine to 10 mg per day. New prescription sent to the pharmacy. She will double the existing prescription. She will continue to monitor her blood pressures.        Resolved Hospital Problems    Diagnosis Date Resolved POA    Atrial fibrillation with RVR [I48.91] 02/03/2022 Unknown     ESRD on HD MWF via AVF  Continue current dialysis prescription.  Next HD per schedule or PRN.  Renal diet - low K, low phos.  No IVs or BP checks on access and/or non-dominant arm.    Anemia of CKD  Hgb and HCT are acceptable. Monitor.  Will provide BRAYAN and/or IV iron PRN.    MBD / Secondary HPT  Ca, phos, PTH and vitamin D levels are acceptable.   Phos binders, vitamin D analogues and calcimimetics as needed.    HTN  BP seem controlled.   Tolerate asymptomatic HTN up to -160.  Continue home meds.  Low sodium diet.    AF with RVR  Chest pain  with EKG changes  [facility] plan for angiogram Monday.    Thank you for allowing us to participate in the care of your patient!   We will follow the patient and provide recommendations as needed.    Patient care time was spent personally by me on the following activities:   · Obtaining a history.  · Examination of patient.  · Providing medical care at the patients bedside.  · Developing a treatment plan with patient or surrogate and bedside caregivers.  · Ordering and reviewing laboratory studies, radiographic studies, pulse oximetry.  · Ordering and performing treatments and interventions.  · Evaluation of patient's response to treatment.  · Discussions with consultants while on the unit and immediately available to the patient.  · Re-evaluation of the patient's condition.  · Documentation in the medical record.     Raleigh Yee MD    [facility]  [address] [place] [address] [id number] [phone] - tel  [phone] - fax    2/6/2022

## 2022-02-06 NOTE — CARE UPDATE
02/05/22 2018   Patient Assessment/Suction   Level of Consciousness (AVPU) alert   Respiratory Effort Unlabored   Expansion/Accessory Muscles/Retractions no use of accessory muscles   All Lung Fields Breath Sounds clear;diminished   Rhythm/Pattern, Respiratory no shortness of breath reported   Cough Frequency no cough   PRE-TX-O2   O2 Device (Oxygen Therapy) room air   SpO2 100 %   Pulse Oximetry Type Continuous   $ Pulse Oximetry - Multiple Charge Pulse Oximetry - Multiple   Pulse (!) 59   Resp (!) 21   Positioning   Head of Bed (HOB) Positioning HOB elevated;HOB at 30 degrees   Respiratory Evaluation   $ Care Plan Tech Time 15 min

## 2022-02-07 LAB
ALBUMIN SERPL BCP-MCNC: 3 G/DL (ref 3.5–5.2)
ALBUMIN SERPL BCP-MCNC: 3.3 G/DL (ref 3.5–5.2)
ALP SERPL-CCNC: 56 U/L (ref 55–135)
ALP SERPL-CCNC: 69 U/L (ref 55–135)
ALT SERPL W/O P-5'-P-CCNC: 24 U/L (ref 10–44)
ALT SERPL W/O P-5'-P-CCNC: 28 U/L (ref 10–44)
ANION GAP SERPL CALC-SCNC: 11 MMOL/L (ref 8–16)
ANION GAP SERPL CALC-SCNC: 11 MMOL/L (ref 8–16)
AST SERPL-CCNC: 19 U/L (ref 10–40)
AST SERPL-CCNC: 23 U/L (ref 10–40)
BILIRUB SERPL-MCNC: 0.6 MG/DL (ref 0.1–1)
BILIRUB SERPL-MCNC: 0.6 MG/DL (ref 0.1–1)
BUN SERPL-MCNC: 19 MG/DL (ref 8–23)
BUN SERPL-MCNC: 49 MG/DL (ref 8–23)
CALCIUM SERPL-MCNC: 8.7 MG/DL (ref 8.7–10.5)
CALCIUM SERPL-MCNC: 9 MG/DL (ref 8.7–10.5)
CATH EF QUANTITATIVE: 60 %
CHLORIDE SERPL-SCNC: 101 MMOL/L (ref 95–110)
CHLORIDE SERPL-SCNC: 107 MMOL/L (ref 95–110)
CO2 SERPL-SCNC: 24 MMOL/L (ref 23–29)
CO2 SERPL-SCNC: 24 MMOL/L (ref 23–29)
CREAT SERPL-MCNC: 3.3 MG/DL (ref 0.5–1.4)
CREAT SERPL-MCNC: 9.3 MG/DL (ref 0.5–1.4)
ERYTHROCYTE [DISTWIDTH] IN BLOOD BY AUTOMATED COUNT: 12.9 % (ref 11.5–14.5)
ERYTHROCYTE [DISTWIDTH] IN BLOOD BY AUTOMATED COUNT: 13.1 % (ref 11.5–14.5)
EST. GFR  (AFRICAN AMERICAN): 14.4 ML/MIN/1.73 M^2
EST. GFR  (AFRICAN AMERICAN): 4.1 ML/MIN/1.73 M^2
EST. GFR  (NON AFRICAN AMERICAN): 12.5 ML/MIN/1.73 M^2
EST. GFR  (NON AFRICAN AMERICAN): 3.6 ML/MIN/1.73 M^2
GLUCOSE SERPL-MCNC: 103 MG/DL (ref 70–110)
GLUCOSE SERPL-MCNC: 115 MG/DL (ref 70–110)
GLUCOSE SERPL-MCNC: 82 MG/DL (ref 70–110)
HBV CORE AB SERPL QL IA: NEGATIVE
HBV SURFACE AB SER QL: NON REACTIVE
HBV SURFACE AG SERPL QL IA: NEGATIVE
HCT VFR BLD AUTO: 30.8 % (ref 37–48.5)
HCT VFR BLD AUTO: 35.4 % (ref 37–48.5)
HGB BLD-MCNC: 11.1 G/DL (ref 12–16)
HGB BLD-MCNC: 9.5 G/DL (ref 12–16)
INR PPP: 1.3
LACTATE SERPL-SCNC: 1.2 MMOL/L (ref 0.5–1.9)
MCH RBC QN AUTO: 29.7 PG (ref 27–31)
MCH RBC QN AUTO: 30.4 PG (ref 27–31)
MCHC RBC AUTO-ENTMCNC: 30.8 G/DL (ref 32–36)
MCHC RBC AUTO-ENTMCNC: 31.4 G/DL (ref 32–36)
MCV RBC AUTO: 96 FL (ref 82–98)
MCV RBC AUTO: 97 FL (ref 82–98)
PLATELET # BLD AUTO: 255 K/UL (ref 150–450)
PLATELET # BLD AUTO: 272 K/UL (ref 150–450)
PMV BLD AUTO: 9.1 FL (ref 9.2–12.9)
PMV BLD AUTO: 9.6 FL (ref 9.2–12.9)
POTASSIUM SERPL-SCNC: 3.2 MMOL/L (ref 3.5–5.1)
POTASSIUM SERPL-SCNC: 5.6 MMOL/L (ref 3.5–5.1)
PROT SERPL-MCNC: 6.2 G/DL (ref 6–8.4)
PROT SERPL-MCNC: 6.9 G/DL (ref 6–8.4)
PROTHROMBIN TIME: 15 SEC (ref 11.4–13.7)
RBC # BLD AUTO: 3.2 M/UL (ref 4–5.4)
RBC # BLD AUTO: 3.65 M/UL (ref 4–5.4)
SODIUM SERPL-SCNC: 136 MMOL/L (ref 136–145)
SODIUM SERPL-SCNC: 142 MMOL/L (ref 136–145)
TROPONIN I SERPL DL<=0.01 NG/ML-MCNC: 0.06 NG/ML
WBC # BLD AUTO: 5.26 K/UL (ref 3.9–12.7)
WBC # BLD AUTO: 5.34 K/UL (ref 3.9–12.7)

## 2022-02-07 PROCEDURE — 93010 EKG 12-LEAD: ICD-10-PCS | Mod: ,,, | Performed by: SPECIALIST

## 2022-02-07 PROCEDURE — 25000003 PHARM REV CODE 250: Performed by: INTERNAL MEDICINE

## 2022-02-07 PROCEDURE — 85027 COMPLETE CBC AUTOMATED: CPT | Mod: 91 | Performed by: INTERNAL MEDICINE

## 2022-02-07 PROCEDURE — 93005 ELECTROCARDIOGRAM TRACING: CPT | Performed by: SPECIALIST

## 2022-02-07 PROCEDURE — 25500020 PHARM REV CODE 255: Performed by: INTERNAL MEDICINE

## 2022-02-07 PROCEDURE — 93458 L HRT ARTERY/VENTRICLE ANGIO: CPT | Mod: 26,,, | Performed by: INTERNAL MEDICINE

## 2022-02-07 PROCEDURE — 93010 ELECTROCARDIOGRAM REPORT: CPT | Mod: ,,, | Performed by: SPECIALIST

## 2022-02-07 PROCEDURE — 21000000 HC CCU ICU ROOM CHARGE

## 2022-02-07 PROCEDURE — 84484 ASSAY OF TROPONIN QUANT: CPT | Performed by: INTERNAL MEDICINE

## 2022-02-07 PROCEDURE — 99900035 HC TECH TIME PER 15 MIN (STAT)

## 2022-02-07 PROCEDURE — 94761 N-INVAS EAR/PLS OXIMETRY MLT: CPT

## 2022-02-07 PROCEDURE — 99153 MOD SED SAME PHYS/QHP EA: CPT | Performed by: INTERNAL MEDICINE

## 2022-02-07 PROCEDURE — 93458 L HRT ARTERY/VENTRICLE ANGIO: CPT | Performed by: INTERNAL MEDICINE

## 2022-02-07 PROCEDURE — C1769 GUIDE WIRE: HCPCS | Performed by: INTERNAL MEDICINE

## 2022-02-07 PROCEDURE — 63600175 PHARM REV CODE 636 W HCPCS: Performed by: STUDENT IN AN ORGANIZED HEALTH CARE EDUCATION/TRAINING PROGRAM

## 2022-02-07 PROCEDURE — 90935 HEMODIALYSIS ONE EVALUATION: CPT

## 2022-02-07 PROCEDURE — 99152 MOD SED SAME PHYS/QHP 5/>YRS: CPT | Performed by: INTERNAL MEDICINE

## 2022-02-07 PROCEDURE — C1760 CLOSURE DEV, VASC: HCPCS | Performed by: INTERNAL MEDICINE

## 2022-02-07 PROCEDURE — 99152 MOD SED SAME PHYS/QHP 5/>YRS: CPT | Mod: ,,, | Performed by: INTERNAL MEDICINE

## 2022-02-07 PROCEDURE — 93458 PR CATH PLACE/CORON ANGIO, IMG SUPER/INTERP,W LEFT HEART VENTRICULOGRAPHY: ICD-10-PCS | Mod: 26,,, | Performed by: INTERNAL MEDICINE

## 2022-02-07 PROCEDURE — 25000003 PHARM REV CODE 250: Performed by: NURSE PRACTITIONER

## 2022-02-07 PROCEDURE — C1894 INTRO/SHEATH, NON-LASER: HCPCS | Performed by: INTERNAL MEDICINE

## 2022-02-07 PROCEDURE — 99152 PR MOD CONSCIOUS SEDATION, SAME PHYS, 5+ YRS, FIRST 15 MIN: ICD-10-PCS | Mod: ,,, | Performed by: INTERNAL MEDICINE

## 2022-02-07 PROCEDURE — C1887 CATHETER, GUIDING: HCPCS | Performed by: INTERNAL MEDICINE

## 2022-02-07 PROCEDURE — 85610 PROTHROMBIN TIME: CPT | Performed by: INTERNAL MEDICINE

## 2022-02-07 PROCEDURE — 83605 ASSAY OF LACTIC ACID: CPT | Performed by: INTERNAL MEDICINE

## 2022-02-07 PROCEDURE — 63600175 PHARM REV CODE 636 W HCPCS: Performed by: INTERNAL MEDICINE

## 2022-02-07 PROCEDURE — 36415 COLL VENOUS BLD VENIPUNCTURE: CPT | Performed by: INTERNAL MEDICINE

## 2022-02-07 PROCEDURE — 80053 COMPREHEN METABOLIC PANEL: CPT | Mod: 91 | Performed by: INTERNAL MEDICINE

## 2022-02-07 DEVICE — DEVICE CLOSURE VASCADE 5FR: Type: IMPLANTABLE DEVICE | Site: GROIN | Status: FUNCTIONAL

## 2022-02-07 RX ORDER — MIDAZOLAM HYDROCHLORIDE 1 MG/ML
INJECTION INTRAMUSCULAR; INTRAVENOUS
Status: DISCONTINUED | OUTPATIENT
Start: 2022-02-07 | End: 2022-02-07 | Stop reason: HOSPADM

## 2022-02-07 RX ORDER — IODIXANOL 320 MG/ML
INJECTION, SOLUTION INTRAVASCULAR
Status: DISCONTINUED | OUTPATIENT
Start: 2022-02-07 | End: 2022-02-07 | Stop reason: HOSPADM

## 2022-02-07 RX ORDER — HEPARIN SODIUM 10000 [USP'U]/ML
INJECTION, SOLUTION INTRAVENOUS; SUBCUTANEOUS
Status: DISCONTINUED | OUTPATIENT
Start: 2022-02-07 | End: 2022-02-07 | Stop reason: HOSPADM

## 2022-02-07 RX ORDER — SODIUM CHLORIDE 450 MG/100ML
INJECTION, SOLUTION INTRAVENOUS CONTINUOUS
Status: ACTIVE | OUTPATIENT
Start: 2022-02-07 | End: 2022-02-07

## 2022-02-07 RX ORDER — LIDOCAINE HYDROCHLORIDE 10 MG/ML
INJECTION, SOLUTION EPIDURAL; INFILTRATION; INTRACAUDAL; PERINEURAL
Status: DISCONTINUED | OUTPATIENT
Start: 2022-02-07 | End: 2022-02-07 | Stop reason: HOSPADM

## 2022-02-07 RX ORDER — ONDANSETRON 2 MG/ML
4 INJECTION INTRAMUSCULAR; INTRAVENOUS ONCE
Status: COMPLETED | OUTPATIENT
Start: 2022-02-07 | End: 2022-02-07

## 2022-02-07 RX ORDER — FENTANYL CITRATE 50 UG/ML
INJECTION, SOLUTION INTRAMUSCULAR; INTRAVENOUS
Status: DISCONTINUED | OUTPATIENT
Start: 2022-02-07 | End: 2022-02-07 | Stop reason: HOSPADM

## 2022-02-07 RX ADMIN — AMIODARONE HYDROCHLORIDE 200 MG: 200 TABLET ORAL at 03:02

## 2022-02-07 RX ADMIN — AMIODARONE HYDROCHLORIDE 200 MG: 200 TABLET ORAL at 08:02

## 2022-02-07 RX ADMIN — ASPIRIN 325 MG ORAL TABLET 325 MG: 325 PILL ORAL at 09:02

## 2022-02-07 RX ADMIN — MUPIROCIN: 20 OINTMENT TOPICAL at 08:02

## 2022-02-07 RX ADMIN — DORZOLAMIDE HYDROCHLORIDE AND TIMOLOL MALEATE 1 DROP: 22.3; 6.8 SOLUTION/ DROPS OPHTHALMIC at 08:02

## 2022-02-07 RX ADMIN — MUPIROCIN: 20 OINTMENT TOPICAL at 09:02

## 2022-02-07 RX ADMIN — ONDANSETRON 4 MG: 2 INJECTION INTRAMUSCULAR; INTRAVENOUS at 06:02

## 2022-02-07 RX ADMIN — DIPHENHYDRAMINE HYDROCHLORIDE 50 MG: 25 CAPSULE ORAL at 09:02

## 2022-02-07 NOTE — PROGRESS NOTES
[facility]  Adult Nutrition   Progress Note (Initial Assessment)     SUMMARY     Recommendations  Recommendation/Intervention:   1. RD recommends and has added Nepro TID.   2. RD recommends to continue current diet as tolerated, encourage intake and assist as needed.   3. Menu  to continue to get dietary preferences daily.    Goals:   Patient to get >75% of her estimated nitritional needs met from 75 to 100% intake of her PO diet and ONS.    Nutrition Goal Status: progressing towards goal    Communication of RD Recs: other (comment) (patient)    Dietitian Rounds Brief  Assessed per Length of Stay : Saw patient today and she was totally asleep and I tried to arouse her to eat her lunch because it was sitting there getting cold. She must have just gotten back from HD and was exhausted because nursing has been recording her eating 75% and I believe this is true. She told me that she was going to eat it in a few minutes. I asked  Her if she would like some Nepro for extra nutrition and she said yes so I have ordered it when her meal intake is not sufficient. She has not had any c/o N/V/D and her LBM was today. Will follow prn till discharge.     Reason for Assessment  Reason For Assessment: length of stay  Diagnosis: cardiac disease,renal disease,psychological disorder,other (see comments) (chest pain)  Relevant Medical History: Hypertension, Hyperlipidemia, Disorder of kidney and ureter, Anxiety, Depression, Gout, Encephalopathy acute, Nephropathy hypertensive, stage 5 chronic kidney disease or end stage renal disease, Moderate episode of recurrent major depressive disorder, Osteopenia of multiple sites, Based upon bone density measurements, patient also has chronic kidney disease, Stroke, A-fib, Obstructive pattern present on pulmonary function testing  Interdisciplinary Rounds: attended    Nutrition Risk Screen  Nutrition Risk Screen: no indicators present     MST Score: 0  Have you recently  "lost weight without trying?: No  Weight loss score: 0  Have you been eating poorly because of a decreased appetite?: No  Appetite score: 0       Nutrition/Diet History  Spiritual, Cultural Beliefs, Holiness Practices, Values that Affect Care: no  Food Allergies: other (see comments) (fish-containing products and peanuts)  Factors Affecting Nutritional Intake: None identified at this time    Anthropometrics  Temp: 98.2 °F (36.8 °C)  Height Method: Stated  Height: 5' 4" (162.6 cm)  Height (inches): 64 in  Weight Method: Bed Scale  Weight: 56 kg (123 lb 7.3 oz)  Weight (lb): 123.46 lb  Ideal Body Weight (IBW), Female: 120 lb  % Ideal Body Weight, Female (lb): 102.88 %  BMI (Calculated): 21.2  BMI Grade: 18.5-24.9 - normal       Weight History:  Wt Readings from Last 10 Encounters:   02/02/22 56 kg (123 lb 7.3 oz)   01/26/22 55.6 kg (122 lb 9.2 oz)   10/11/21 55.8 kg (123 lb)   06/25/21 57 kg (125 lb 10.6 oz)   06/07/21 57.2 kg (126 lb)   05/26/21 58.1 kg (128 lb 1.4 oz)   03/05/21 58.1 kg (128 lb 1.4 oz)   02/02/21 58.5 kg (129 lb)   12/31/20 57.6 kg (127 lb)   08/31/20 56.7 kg (125 lb)       Lab/Procedures/Meds: Pertinent Labs Reviewed    Clinical Chemistry:  Recent Labs   Lab 02/03/22  0323 02/04/22  0628 02/07/22  0703   *   < > 136   K 5.1   < > 5.6*   CL 94*   < > 101   CO2 31*   < > 24   GLU 84   < > 82   BUN 26*   < > 49*   CREATININE 5.9*   < > 9.3*   CALCIUM 8.4*   < > 8.7   PROT 6.2   < > 6.2   ALBUMIN 3.1*   < > 3.0*   BILITOT 0.9   < > 0.6   ALKPHOS 59   < > 56   AST 18   < > 19   ALT 22   < > 24   ANIONGAP 9   < > 11   ESTGFRAFRICA 7.1*   < > 4.1*   EGFRNONAA 6.2*   < > 3.6*   MG 2.0  --   --    PHOS 3.6  --   --     < > = values in this interval not displayed.       CBC:   Recent Labs   Lab 02/07/22  0703   WBC 5.34   RBC 3.20*   HGB 9.5*   HCT 30.8*      MCV 96   MCH 29.7   MCHC 30.8*         Cardiac Profile:  Recent Labs   Lab 02/02/22  1117 02/02/22  1701 02/02/22  2213 02/02/22  2214 "   *  --   --   --      --   --   --    CPKMB 2.3 2.8 2.0  --    TROPONINI 0.045* 0.068*  --  0.068*         Medications: Pertinent Medications reviewed    Scheduled Meds:   sodium chloride 0.9%   Intravenous Once    amiodarone  200 mg Oral TID    aspirin  325 mg Oral Daily    atorvastatin  40 mg Oral Daily    calcium acetate(phosphat bind)  667 mg Oral Daily    docusate sodium  100 mg Oral Daily    dorzolamide-timolol 2-0.5%  1 drop Both Eyes BID    latanoprost  1 drop Both Eyes QHS    mupirocin   Nasal BID       Continuous Infusions:   sodium chloride 0.45%         PRN Meds:.acetaminophen, melatonin, nitroGLYCERIN, ondansetron, ondansetron, polyethylene glycol, sodium chloride 0.9%, sodium chloride 0.9%    Estimated/Assessed Needs  Weight Used For Calorie Calculations: 56 kg (123 lb 7.3 oz)  Energy Calorie Requirements (kcal): 6393-6716 kcals/day (25-30 kcals/kg ABW)  Energy Need Method: Kcal/kg  Protein Requirements: 66-83 g/day (1.2-1.5 g/kg IBW)  Weight Used For Protein Calculations: 55 kg (121 lb 4.1 oz)  Fluid Requirements (mL): 24 H UOP + 1000 mL     RDA Method (mL): 1400       Nutrition Prescription Ordered  Current Diet Order: Cardiac    Evaluation of Received Nutrient/Fluid Intake  Energy Calories Required: meeting needs  Protein Required: meeting needs  Fluid Required: meeting needs  Tolerance: tolerating     Intake/Output Summary (Last 24 hours) at 2/7/2022 1456  Last data filed at 2/7/2022 0600  Gross per 24 hour   Intake 480 ml   Output --   Net 480 ml      % Intake of Estimated Energy Needs: 75 - 100 %  % Meal Intake: 75 - 100 %    Nutrition Risk  Level of Risk/Frequency of Follow-up: moderate     Monitor and Evaluation  Food and Nutrient Intake: food and beverage intake,energy intake  Food and Nutrient Adminstration: diet order  Knowledge/Beliefs/Attitudes: food and nutrition knowledge/skill,beliefs and attitudes  Physical Activity and Function: nutrition-related ADLs and  IADLs  Anthropometric Measurements: weight,weight change,body mass index  Biochemical Data, Medical Tests and Procedures: inflammatory profile,glucose/endocrine profile,gastrointestinal profile,electrolyte and renal panel,lipid profile  Nutrition-Focused Physical Findings: overall appearance     Nutrition Follow-Up  RD Follow-up?: Yes  Zenobia Ross RD 02/07/2022   3:03 PM

## 2022-02-07 NOTE — PROGRESS NOTES
Nephrology Consult Note        Patient Name: Tyra Isaac  MRN: [id number]    Patient Class: IP- Inpatient   Admission Date: 2/2/2022  Length of Stay: 5 days  Date of Service: 2/7/2022    Attending Physician: Ousmane Mcgregor MD  Primary Care Provider: Kalpesh Goel MD    Reason for Consult: esrd/anemia/htn/shpt/chest pain/afrvr    SUBJECTIVE:     HPI: 81F with ESRD on HD via RUE AVF on MWF, paroxysmal atrial fibrillation, on Coumadin, chronic anemia, hypertension, hyperlipidemia, anxiety/depression, presenting with chest pain and Afib RVR from dialysis, completed dialysis today. Patient discharged 5 days ago after similar presentation, underwent stress test which was negative for reversible ischemia, Lopressor 25 b.i.d. added for Afib. Patient received sublingual nitroglycerin today, however pain did not resolve until heart rate controlled, overall pain lasted for 45 minutes, she denied any palpitation nausea short of breath.    2/3 VSS, no new complains. HD in AM or can be dc from renal standpoint.  2/4 VSS, HD later today.  2/5 VSS, tolerated HD with 800ml UF yesterday. Plan for angiogram Monday.  2/6 VSS, no new complains. HD in AM as well as angiogram by Cards.    2/7  AFVSS.  At a procedure    Past Medical History:   Diagnosis Date    A-fib     Anxiety     Depression     Disorder of kidney and ureter     Encephalopathy acute 1/1/2018    End stage kidney disease 6/17/2017    Gout     Hyperlipidemia     Hypertension     Moderate episode of recurrent major depressive disorder 1/17/2018    Nephropathy hypertensive, stage 5 chronic kidney disease or end stage renal disease 6/17/2017    Obstructive pattern present on pulmonary function testing 7/28/2021    Shows moderate obstruction.    Osteopenia of multiple sites 3/9/2018    Based upon bone density measurements. Patient also has chronic kidney disease.    Stroke 11/2016     Past Surgical History:   Procedure Laterality Date    CARDIAC SURGERY       stents    WRIST SURGERY       Family History   Problem Relation Age of Onset    Heart disease Mother     Cancer Father      Social History     Tobacco Use    Smoking status: Never Smoker    Smokeless tobacco: Never Used   Substance Use Topics    Alcohol use: No    Drug use: No       Review of patient's allergies indicates:   Allergen Reactions    Cyclobenzaprine     Fish containing products Hives    Peanut     Tramadol Itching       Outpatient meds:  No current facility-administered medications on file prior to encounter.     Current Outpatient Medications on File Prior to Encounter   Medication Sig Dispense Refill    amLODIPine (NORVASC) 5 MG tablet Take 5 mg by mouth once daily.      atorvastatin (LIPITOR) 40 MG tablet Take 40 mg by mouth once daily.      b complex vitamins tablet Take 1 tablet by mouth once daily.      calcium acetate (PHOSLO) 667 mg capsule Take 667 mg by mouth Daily.  6    docusate sodium (COLACE) 100 MG capsule Take 100 mg by mouth once daily.       dorzolamide-timolol 2-0.5% (COSOPT) 22.3-6.8 mg/mL ophthalmic solution Place 1 drop into both eyes 2 (two) times daily.       latanoprost 0.005 % ophthalmic solution Place 1 drop into both eyes every evening.       lidocaine 5 % Crea Apply 1 application topically every Mon, Wed, Fri.       warfarin (COUMADIN) 2 MG tablet Take 2 mg by mouth every Mon, Wed, Fri.      warfarin (COUMADIN) 3 MG tablet Take 3 mg by mouth every Tuesday, Thursday, Saturday, Sunday.      metoprolol tartrate (LOPRESSOR) 25 MG tablet Take 1 tablet (25 mg total) by mouth 2 (two) times daily. 60 tablet 0    warfarin (COUMADIN) 1 MG tablet Take 1 tablet (1 mg total) by mouth Daily. Continue to take coumadin as you were prior to admission with intermittent INR checks and dose adjustments as needed 30 tablet 0       Scheduled meds:   sodium chloride 0.9%   Intravenous Once    amiodarone  200 mg Oral TID    aspirin  325 mg Oral Daily    atorvastatin  40  mg Oral Daily    calcium acetate(phosphat bind)  667 mg Oral Daily    docusate sodium  100 mg Oral Daily    dorzolamide-timolol 2-0.5%  1 drop Both Eyes BID    latanoprost  1 drop Both Eyes QHS    mupirocin   Nasal BID       Infusions:      PRN meds:      Review of Systems:  Review of Systems   Constitutional: Negative for chills, fever, malaise/fatigue and weight loss.   HENT: Negative for hearing loss and nosebleeds.    Eyes: Negative for blurred vision, double vision and photophobia.   Respiratory: Negative for cough, shortness of breath and wheezing.    Cardiovascular: Negative for chest pain, palpitations and leg swelling.   Gastrointestinal: Negative for abdominal pain, constipation, diarrhea, heartburn, nausea and vomiting.   Genitourinary: Negative for dysuria, frequency and urgency.   Musculoskeletal: Negative for falls, joint pain and myalgias.   Skin: Negative for itching and rash.   Neurological: Negative for dizziness, speech change, focal weakness, loss of consciousness and headaches.   Endo/Heme/Allergies: Does not bruise/bleed easily.   Psychiatric/Behavioral: Negative for depression and substance abuse. The patient is not nervous/anxious.      OBJECTIVE:     Vital Signs and IO (Last 24H):  Temp:  [98.2 °F (36.8 °C)-98.9 °F (37.2 °C)]   Pulse:  [56-66]   Resp:  [16-24]   BP: (110-138)/(51-73)   SpO2:  [97 %-100 %]   I/O last 3 completed shifts:  In: 1160 [P.O.:1160]  Out: -     Wt Readings from Last 5 Encounters:   02/02/22 56 kg (123 lb 7.3 oz)   01/26/22 55.6 kg (122 lb 9.2 oz)   10/11/21 55.8 kg (123 lb)   06/25/21 57 kg (125 lb 10.6 oz)   06/07/21 57.2 kg (126 lb)     Physical Exam:  Physical Exam  Constitutional:       Appearance: She is well-developed. She is not diaphoretic.   HENT:      Head: Normocephalic and atraumatic.   Eyes:      General: No scleral icterus.     Pupils: Pupils are equal, round, and reactive to light.   Cardiovascular:      Rate and Rhythm: Normal rate and regular  rhythm.   Pulmonary:      Effort: Pulmonary effort is normal. No respiratory distress.      Breath sounds: No stridor.   Abdominal:      General: There is no distension.      Palpations: Abdomen is soft.   Musculoskeletal:         General: No deformity. Normal range of motion.      Cervical back: Neck supple.   Skin:     General: Skin is warm and dry.      Findings: No erythema or rash.   Neurological:      Mental Status: She is alert and oriented to person, place, and time.      Cranial Nerves: No cranial nerve deficit.   Psychiatric:         Behavior: Behavior normal.     Body mass index is 21.19 kg/m².    Laboratory:  Recent Labs   Lab 02/05/22 0637 02/06/22 0617 02/07/22  0703    137 136   K 5.1 5.0 5.6*   CL 99 100 101   CO2 27 25 24   BUN 26* 39* 49*   CREATININE 5.4* 7.3* 9.3*   ESTGFRAFRICA 7.9* 5.5* 4.1*   EGFRNONAA 6.9* 4.8* 3.6*   GLU 79 86 82       Recent Labs   Lab 02/02/22 1117 02/02/22 1117 02/03/22 0323 02/04/22 0628 02/05/22 0637 02/06/22 0617 02/07/22  0703   CALCIUM 9.1   < > 8.4*   < > 8.6* 8.6* 8.7   ALBUMIN 3.8   < > 3.1*   < > 3.1* 3.0* 3.0*   PHOS  --   --  3.6  --   --   --   --    MG 2.1  --  2.0  --   --   --   --     < > = values in this interval not displayed.             No results for input(s): POCTGLUCOSE in the last 168 hours.    Recent Labs   Lab 01/26/22  1525   Hemoglobin A1C 5.3       Recent Labs   Lab 02/02/22 1117 02/02/22 1117 02/03/22 0323 02/04/22 0628 02/05/22 0637 02/06/22  0617 02/07/22  0703   WBC 7.17   < > 7.67   < > 4.90 5.15 5.34   HGB 11.8*   < > 10.2*   < > 10.2* 9.9* 9.5*   HCT 38.4   < > 33.3*   < > 33.2* 32.0* 30.8*      < > 239   < > 250 249 255   MCV 97   < > 99*   < > 99* 98 96   MCHC 30.7*   < > 30.6*   < > 30.7* 30.9* 30.8*   MONO 13.7  1.0  --  13.6  1.0  --   --   --   --     < > = values in this interval not displayed.       Recent Labs   Lab 02/05/22  0637 02/06/22  0617 02/07/22  0703   BILITOT 0.6 0.6 0.6   PROT 6.4 6.3 6.2    ALBUMIN 3.1* 3.0* 3.0*   ALKPHOS 59 55 56   ALT 21 18 24   AST 16 15 19       Recent Labs   Lab 02/02/22  1605   Color, UA Yellow   Appearance, UA Clear   pH, UA >8.0 A   Specific Gravity, UA 1.005   Protein, UA 1+ A   Glucose, UA Negative   Ketones, UA Negative   Urobilinogen, UA Negative   Bilirubin (UA) Negative   Occult Blood UA Negative   Nitrite, UA Negative   RBC, UA 1   WBC, UA 1   Bacteria Negative   Hyaline Casts, UA 3 A       Recent Labs   Lab 12/13/19  1243   POC PH 7.51 H   POC PCO2 43   POC PO2 90   POC SATURATED O2 97.9       Microbiology Results (last 7 days)     ** No results found for the last 168 hours. **        ASSESSMENT/PLAN:     Active Hospital Problems    Diagnosis  POA    *Chest pain [R07.9]  Unknown    Non-compliance [Z91.19]  Not Applicable    Atrial fibrillation with RVR [I48.91]  Unknown    ESRD on HD via are right upper extremity AVF MWF [N18.6]  Yes     Chronic    Benign hypertension with ESRD (end-stage renal disease) [I12.0, N18.6]  Yes     BP slightly elevated1.21.15 BP elelvated. Took meds in [facility] will increase amlodipine to 10 mg per day. New prescription sent to the pharmacy. She will double the existing prescription. She will continue to monitor her blood pressures.        Resolved Hospital Problems    Diagnosis Date Resolved POA    Atrial fibrillation with RVR [I48.91] 02/03/2022 Unknown     ESRD on HD MWF via AVF  Continue current dialysis prescription.  Next HD per schedule or PRN.  Renal diet - low K, low phos.  No IVs or BP checks on access and/or non-dominant arm.    Anemia of CKD  Hgb and HCT are acceptable. Monitor.  Will provide BRAYAN and/or IV iron PRN.    MBD / Secondary HPT  Ca, phos, PTH and vitamin D levels are acceptable.   Phos binders, vitamin D analogues and calcimimetics as needed.    HTN  BP seem controlled.   Tolerate asymptomatic HTN up to -160.  Continue home meds.  Low sodium diet.    AF with RVR  Chest pain with EKG changes  [facility]rec[facility] Cards  plan for angiogram Monday.    Hyperkalemia  --dialysis today  --low k diet    Thank you for allowing us to participate in the care of your patient!   We will follow the patient and provide recommendations as needed.    Patient care time was spent personally by me on the following activities:   · Obtaining a history.  · Examination of patient.  · Providing medical care at the patients bedside.  · Developing a treatment plan with patient or surrogate and bedside caregivers.  · Ordering and reviewing laboratory studies, radiographic studies, pulse oximetry.  · Ordering and performing treatments and interventions.  · Evaluation of patient's response to treatment.  · Discussions with consultants while on the unit and immediately available to the patient.  · Re-evaluation of the patient's condition.  · Documentation in the medical record.     Jimenez Valero MD    [facility]  [address] [place] [address]  [place] [address]    [phone] - tel  [phone] - fax    2/7/2022

## 2022-02-07 NOTE — PROGRESS NOTES
[facility] Medicine  Progress Note    Patient Name: Tyra Isaac  MRN: [id number]  Patient Class: IP- Inpatient   Admission Date: 2/2/2022  Length of Stay: 4 days  Attending Physician: Ousmane Mcgregor MD  Primary Care Provider: Kalpesh Goel MD        Subjective:     Principal Problem:Chest pain        HPI:  No notes on file    Overview/Hospital Course:  02/03  Off Cardizem drip  Pt will have cardiac cath tomorrow    02/04  INR levels on higher range   Pt denies any issues     02/05  No new issues  Awaiting angiogram on 02/07 02/06  INR levels on lower range  Pt will have cardiac cath Tmrw AM       Interval History:     Review of Systems   Constitutional: Negative for activity change and appetite change.   HENT: Negative for congestion and dental problem.    Eyes: Negative for discharge and itching.   Respiratory: Negative for shortness of breath.    Cardiovascular: Negative for chest pain.   Gastrointestinal: Negative for abdominal distention and abdominal pain.   Endocrine: Negative for cold intolerance.   Genitourinary: Negative for difficulty urinating and dysuria.   Musculoskeletal: Negative for arthralgias and back pain.   Skin: Negative for color change.   Neurological: Negative for dizziness and facial asymmetry.   Hematological: Negative for adenopathy.   Psychiatric/Behavioral: Negative for agitation and behavioral problems.     Objective:     Vital Signs (Most Recent):  Temp: 98.4 °F (36.9 °C) (02/06/22 1924)  Pulse: 62 (02/06/22 1924)  Resp: (!) 22 (02/06/22 1924)  BP: 130/63 (02/06/22 1924)  SpO2: 100 % (02/06/22 1924) Vital Signs (24h Range):  Temp:  [98 °F (36.7 °C)-98.7 °F (37.1 °C)] 98.4 °F (36.9 °C)  Pulse:  [56-66] 62  Resp:  [18-31] 22  SpO2:  [97 %-100 %] 100 %  BP: (108-134)/(51-74) 130/63     Weight: 56 kg (123 lb 7.3 oz)  Body mass index is 21.19 kg/m².    Intake/Output Summary (Last 24 hours) at 2/6/2022 1954  Last data filed at 2/6/2022 1900  Gross per 24 hour    Intake 920 ml   Output --   Net 920 ml      Physical Exam  Vitals and nursing note reviewed.   Constitutional:       General: She is not in acute distress.  HENT:      Head: Atraumatic.      Right Ear: External ear normal.      Left Ear: External ear normal.      Nose: Nose normal.      Mouth/Throat:      Mouth: Mucous membranes are moist.   Eyes:      General: No scleral icterus.     Extraocular Movements: EOM normal.   Cardiovascular:      Rate and Rhythm: Normal rate.   Pulmonary:      Effort: Pulmonary effort is normal.   Abdominal:      General: There is no distension.   Musculoskeletal:         General: No edema. Normal range of motion.      Cervical back: Normal range of motion.   Skin:     General: Skin is warm.   Neurological:      Mental Status: She is alert and oriented to person, place, and time.   Psychiatric:         Mood and Affect: Mood and affect normal.         Behavior: Behavior normal.         Significant Labs:   All pertinent labs within the past 24 hours have been reviewed.  CBC:   Recent Labs   Lab 02/05/22  0637 02/06/22  0617   WBC 4.90 5.15   HGB 10.2* 9.9*   HCT 33.2* 32.0*    249     CMP:   Recent Labs   Lab 02/05/22  0637 02/06/22  0617    137   K 5.1 5.0   CL 99 100   CO2 27 25   GLU 79 86   BUN 26* 39*   CREATININE 5.4* 7.3*   CALCIUM 8.6* 8.6*   PROT 6.4 6.3   ALBUMIN 3.1* 3.0*   BILITOT 0.6 0.6   ALKPHOS 59 55   AST 16 15   ALT 21 18   ANIONGAP 11 12   EGFRNONAA 6.9* 4.8*       Significant Imaging: I have reviewed all pertinent imaging results/findings within the past 24 hours.      Assessment/Plan:      * Chest pain  Developed chest pain during HD session  Recent admission with same c/o and Lexiscan stress test was normal  Again getting admitted with Chest pain and A Fib  Pt scheduled for cardiac cath  On 02/07      Atrial fibrillation with RVR  Was on iv cardizem  gtt  Now on amiodarone PO as per Cardiology recommendations      Non-compliance  Prescribed Metoprolol  when got admitted few days ago with A Fib RVR  Havent started medicine so far       ESRD on HD via are right upper extremity AVF MWF  HD as per normal schedule      Benign hypertension with ESRD (end-stage renal disease)  Stable         VTE Risk Mitigation (From admission, onward)         Ordered     IP VTE HIGH RISK PATIENT  Once         02/02/22 1648     Place sequential compression device  Until discontinued         02/02/22 1648     Reason for No Pharmacological VTE Prophylaxis  Once        Question:  Reasons:  Answer:  Already adequately anticoagulated on oral Anticoagulants    02/02/22 1648                Discharge Planning   ILAN:      Code Status: Full Code   Is the patient medically ready for discharge?: No    Reason for patient still in hospital (select all that apply): Treatment  Discharge Plan A: Home                  Ousmane Mcgregor MD  Department of Hospital Medicine   [facility]

## 2022-02-07 NOTE — NURSING TRANSFER
Nursing Transfer Note      2/7/2022     Reason patient is being transferred: back to room     Transfer To: 2528 from 1406    Transfer via stretcher    Transfer with cardiac monitoring    Transported by [facility]            Chart send with patient: Yes

## 2022-02-07 NOTE — CARE UPDATE
02/07/22 0820   PRE-TX-O2   O2 Device (Oxygen Therapy) room air   Pulse Oximetry Type Continuous   $ Pulse Oximetry - Multiple Charge Pulse Oximetry - Multiple   Resp 16   Respiratory Evaluation   $ Care Plan Tech Time 15 min

## 2022-02-07 NOTE — SUBJECTIVE & OBJECTIVE
Interval History:     Review of Systems   Constitutional: Negative for activity change and appetite change.   HENT: Negative for congestion and dental problem.    Eyes: Negative for discharge and itching.   Respiratory: Negative for shortness of breath.    Cardiovascular: Negative for chest pain.   Gastrointestinal: Negative for abdominal distention and abdominal pain.   Endocrine: Negative for cold intolerance.   Genitourinary: Negative for difficulty urinating and dysuria.   Musculoskeletal: Negative for arthralgias and back pain.   Skin: Negative for color change.   Neurological: Negative for dizziness and facial asymmetry.   Hematological: Negative for adenopathy.   Psychiatric/Behavioral: Negative for agitation and behavioral problems.     Objective:     Vital Signs (Most Recent):  Temp: 98.4 °F (36.9 °C) (02/06/22 1924)  Pulse: 62 (02/06/22 1924)  Resp: (!) 22 (02/06/22 1924)  BP: 130/63 (02/06/22 1924)  SpO2: 100 % (02/06/22 1924) Vital Signs (24h Range):  Temp:  [98 °F (36.7 °C)-98.7 °F (37.1 °C)] 98.4 °F (36.9 °C)  Pulse:  [56-66] 62  Resp:  [18-31] 22  SpO2:  [97 %-100 %] 100 %  BP: (108-134)/(51-74) 130/63     Weight: 56 kg (123 lb 7.3 oz)  Body mass index is 21.19 kg/m².    Intake/Output Summary (Last 24 hours) at 2/6/2022 1954  Last data filed at 2/6/2022 1900  Gross per 24 hour   Intake 920 ml   Output --   Net 920 ml      Physical Exam  Vitals and nursing note reviewed.   Constitutional:       General: She is not in acute distress.  HENT:      Head: Atraumatic.      Right Ear: External ear normal.      Left Ear: External ear normal.      Nose: Nose normal.      Mouth/Throat:      Mouth: Mucous membranes are moist.   Eyes:      General: No scleral icterus.     Extraocular Movements: EOM normal.   Cardiovascular:      Rate and Rhythm: Normal rate.   Pulmonary:      Effort: Pulmonary effort is normal.   Abdominal:      General: There is no distension.   Musculoskeletal:         General: No edema. Normal  range of motion.      Cervical back: Normal range of motion.   Skin:     General: Skin is warm.   Neurological:      Mental Status: She is alert and oriented to person, place, and time.   Psychiatric:         Mood and Affect: Mood and affect normal.         Behavior: Behavior normal.         Significant Labs:   All pertinent labs within the past 24 hours have been reviewed.  CBC:   Recent Labs   Lab 02/05/22 0637 02/06/22  0617   WBC 4.90 5.15   HGB 10.2* 9.9*   HCT 33.2* 32.0*    249     CMP:   Recent Labs   Lab 02/05/22  0637 02/06/22  0617    137   K 5.1 5.0   CL 99 100   CO2 27 25   GLU 79 86   BUN 26* 39*   CREATININE 5.4* 7.3*   CALCIUM 8.6* 8.6*   PROT 6.4 6.3   ALBUMIN 3.1* 3.0*   BILITOT 0.6 0.6   ALKPHOS 59 55   AST 16 15   ALT 21 18   ANIONGAP 11 12   EGFRNONAA 6.9* 4.8*       Significant Imaging: I have reviewed all pertinent imaging results/findings within the past 24 hours.

## 2022-02-07 NOTE — PLAN OF CARE
Problem: Malnutrition  Goal: Improved Nutritional Intake  Outcome: Ongoing, Progressing  Intervention: Promote and Optimize Oral Intake  Flowsheets (Taken 2/7/2022 1503)  Oral Nutrition Promotion: calorie-dense liquids provided

## 2022-02-07 NOTE — CARE UPDATE
02/06/22 2051   Patient Assessment/Suction   Level of Consciousness (AVPU) alert   Respiratory Effort Unlabored   Expansion/Accessory Muscles/Retractions no use of accessory muscles   All Lung Fields Breath Sounds clear;equal bilaterally   Rhythm/Pattern, Respiratory no shortness of breath reported   Cough Frequency no cough   PRE-TX-O2   O2 Device (Oxygen Therapy) room air   SpO2 100 %   Pulse Oximetry Type Continuous   $ Pulse Oximetry - Multiple Charge Pulse Oximetry - Multiple   Pulse 65   Resp 20   Positioning   Head of Bed (HOB) Positioning HOB elevated;HOB at 30 degrees   Respiratory Evaluation   $ Care Plan Tech Time 15 min

## 2022-02-07 NOTE — PLAN OF CARE
Problem: Fall Injury Risk  Goal: Absence of Fall and Fall-Related Injury  Outcome: Ongoing, Progressing     Problem: Dysrhythmia  Goal: Normalized Cardiac Rhythm  Outcome: Ongoing, Progressing     Problem: Chest Pain  Goal: Resolution of Chest Pain Symptoms  Outcome: Ongoing, Progressing     Problem: Device-Related Complication Risk (Hemodialysis)  Goal: Safe, Effective Therapy Delivery  Outcome: Ongoing, Progressing

## 2022-02-08 ENCOUNTER — TELEPHONE (OUTPATIENT)
Dept: CARDIOLOGY | Facility: CLINIC | Age: 82
End: 2022-02-08
Payer: MEDICARE

## 2022-02-08 VITALS
SYSTOLIC BLOOD PRESSURE: 115 MMHG | OXYGEN SATURATION: 98 % | HEIGHT: 64 IN | TEMPERATURE: 99 F | WEIGHT: 123.44 LBS | DIASTOLIC BLOOD PRESSURE: 56 MMHG | HEART RATE: 66 BPM | RESPIRATION RATE: 15 BRPM | BODY MASS INDEX: 21.07 KG/M2

## 2022-02-08 PROBLEM — R07.9 CHEST PAIN: Status: RESOLVED | Noted: 2022-02-03 | Resolved: 2022-02-08

## 2022-02-08 PROBLEM — I48.91 ATRIAL FIBRILLATION WITH RVR: Status: RESOLVED | Noted: 2022-02-03 | Resolved: 2022-02-08

## 2022-02-08 LAB
ALBUMIN SERPL BCP-MCNC: 2.9 G/DL (ref 3.5–5.2)
ALP SERPL-CCNC: 57 U/L (ref 55–135)
ALT SERPL W/O P-5'-P-CCNC: 22 U/L (ref 10–44)
ANION GAP SERPL CALC-SCNC: 12 MMOL/L (ref 8–16)
AST SERPL-CCNC: 14 U/L (ref 10–40)
BILIRUB SERPL-MCNC: 0.8 MG/DL (ref 0.1–1)
BUN SERPL-MCNC: 45 MG/DL (ref 8–23)
CALCIUM SERPL-MCNC: 8.4 MG/DL (ref 8.7–10.5)
CHLORIDE SERPL-SCNC: 102 MMOL/L (ref 95–110)
CO2 SERPL-SCNC: 22 MMOL/L (ref 23–29)
CREAT SERPL-MCNC: 8.4 MG/DL (ref 0.5–1.4)
ERYTHROCYTE [DISTWIDTH] IN BLOOD BY AUTOMATED COUNT: 13.2 % (ref 11.5–14.5)
EST. GFR  (AFRICAN AMERICAN): 4.7 ML/MIN/1.73 M^2
EST. GFR  (NON AFRICAN AMERICAN): 4 ML/MIN/1.73 M^2
GLUCOSE SERPL-MCNC: 71 MG/DL (ref 70–110)
HCT VFR BLD AUTO: 30 % (ref 37–48.5)
HGB BLD-MCNC: 9.7 G/DL (ref 12–16)
INR PPP: 1.1
MCH RBC QN AUTO: 31 PG (ref 27–31)
MCHC RBC AUTO-ENTMCNC: 32.3 G/DL (ref 32–36)
MCV RBC AUTO: 96 FL (ref 82–98)
PLATELET # BLD AUTO: 236 K/UL (ref 150–450)
PMV BLD AUTO: 9.6 FL (ref 9.2–12.9)
POTASSIUM SERPL-SCNC: 5.7 MMOL/L (ref 3.5–5.1)
PROT SERPL-MCNC: 6.2 G/DL (ref 6–8.4)
PROTHROMBIN TIME: 13.8 SEC (ref 11.4–13.7)
RBC # BLD AUTO: 3.13 M/UL (ref 4–5.4)
SODIUM SERPL-SCNC: 136 MMOL/L (ref 136–145)
WBC # BLD AUTO: 7.93 K/UL (ref 3.9–12.7)

## 2022-02-08 PROCEDURE — 85027 COMPLETE CBC AUTOMATED: CPT | Performed by: INTERNAL MEDICINE

## 2022-02-08 PROCEDURE — 94761 N-INVAS EAR/PLS OXIMETRY MLT: CPT

## 2022-02-08 PROCEDURE — 25000003 PHARM REV CODE 250: Performed by: INTERNAL MEDICINE

## 2022-02-08 PROCEDURE — 85610 PROTHROMBIN TIME: CPT | Performed by: INTERNAL MEDICINE

## 2022-02-08 PROCEDURE — 80053 COMPREHEN METABOLIC PANEL: CPT | Performed by: INTERNAL MEDICINE

## 2022-02-08 PROCEDURE — 99900035 HC TECH TIME PER 15 MIN (STAT)

## 2022-02-08 PROCEDURE — 36415 COLL VENOUS BLD VENIPUNCTURE: CPT | Performed by: INTERNAL MEDICINE

## 2022-02-08 RX ORDER — AMIODARONE HYDROCHLORIDE 200 MG/1
200 TABLET ORAL 2 TIMES DAILY
Qty: 60 TABLET | Refills: 0 | Status: ON HOLD | OUTPATIENT
Start: 2022-02-08 | End: 2023-03-07

## 2022-02-08 RX ADMIN — ACETAMINOPHEN 650 MG: 325 TABLET, FILM COATED ORAL at 05:02

## 2022-02-08 RX ADMIN — MUPIROCIN: 20 OINTMENT TOPICAL at 12:02

## 2022-02-08 RX ADMIN — ATORVASTATIN CALCIUM 40 MG: 40 TABLET, FILM COATED ORAL at 12:02

## 2022-02-08 RX ADMIN — MUPIROCIN: 20 OINTMENT TOPICAL at 08:02

## 2022-02-08 RX ADMIN — DORZOLAMIDE HYDROCHLORIDE AND TIMOLOL MALEATE 1 DROP: 22.3; 6.8 SOLUTION/ DROPS OPHTHALMIC at 12:02

## 2022-02-08 RX ADMIN — AMIODARONE HYDROCHLORIDE 200 MG: 200 TABLET ORAL at 12:02

## 2022-02-08 RX ADMIN — AMIODARONE HYDROCHLORIDE 200 MG: 200 TABLET ORAL at 08:02

## 2022-02-08 RX ADMIN — ASPIRIN 325 MG ORAL TABLET 325 MG: 325 PILL ORAL at 08:02

## 2022-02-08 RX ADMIN — ACETAMINOPHEN 650 MG: 325 TABLET, FILM COATED ORAL at 12:02

## 2022-02-08 RX ADMIN — DORZOLAMIDE HYDROCHLORIDE AND TIMOLOL MALEATE 1 DROP: 22.3; 6.8 SOLUTION/ DROPS OPHTHALMIC at 08:02

## 2022-02-08 RX ADMIN — DOCUSATE SODIUM 100 MG: 100 CAPSULE, LIQUID FILLED ORAL at 08:02

## 2022-02-08 RX ADMIN — LATANOPROST 1 DROP: 50 SOLUTION OPHTHALMIC at 12:02

## 2022-02-08 NOTE — NURSING
Made aware that rapid response was called in dialysis and called updated and rapid response team that patient was given sedation and had a angiogram.  Vital signs, labs, mediations ordered per orders and EKG obtained per chart review.

## 2022-02-08 NOTE — PROGRESS NOTES
[facility] Medicine  Progress Note    Patient Name: Tyra Isaac  MRN: [id number]  Patient Class: IP- Inpatient   Admission Date: 2/2/2022  Length of Stay: 5 days  Attending Physician: Ousmane Mcgregor MD  Primary Care Provider: Kalpesh Goel MD        Subjective:     Principal Problem:Chest pain        HPI:  No notes on file    Overview/Hospital Course:  02/03  Off Cardizem drip  Pt will have cardiac cath tomorrow    02/04  INR levels on higher range   Pt denies any issues     02/05  No new issues  Awaiting angiogram on 02/07 02/06  INR levels on lower range  Pt will have cardiac cath Tmrw AM     02/07  Had Cardiac cath today and no intervention was done  Later pt had Dialysis and Rapid response was called because of vasovagal syncope      Interval History:     Review of Systems   Constitutional: Negative for activity change and appetite change.   HENT: Negative for congestion and dental problem.    Eyes: Negative for discharge and itching.   Respiratory: Negative for shortness of breath.    Cardiovascular: Negative for chest pain.   Gastrointestinal: Negative for abdominal distention and abdominal pain.   Endocrine: Negative for cold intolerance.   Genitourinary: Negative for difficulty urinating and dysuria.   Musculoskeletal: Negative for arthralgias and back pain.   Skin: Negative for color change.   Neurological: Negative for dizziness and facial asymmetry.   Hematological: Negative for adenopathy.   Psychiatric/Behavioral: Negative for agitation and behavioral problems.     Objective:     Vital Signs (Most Recent):  Temp: 98.6 °F (37 °C) (02/07/22 1830)  Pulse: 63 (02/07/22 1830)  Resp: 18 (02/07/22 1830)  BP: 116/68 (02/07/22 1830)  SpO2: (!) 94 % (02/07/22 1830) Vital Signs (24h Range):  Temp:  [98.2 °F (36.8 °C)-98.9 °F (37.2 °C)] 98.6 °F (37 °C)  Pulse:  [58-69] 63  Resp:  [16-25] 18  SpO2:  [93 %-100 %] 94 %  BP: ()/(51-74) 116/68     Weight: 56 kg (123 lb 7.3 oz)  Body  mass index is 21.19 kg/m².    Intake/Output Summary (Last 24 hours) at 2/7/2022 2113  Last data filed at 2/7/2022 1830  Gross per 24 hour   Intake 790 ml   Output 812 ml   Net -22 ml      Physical Exam  Vitals and nursing note reviewed.   Constitutional:       General: She is not in acute distress.  HENT:      Head: Atraumatic.      Right Ear: External ear normal.      Left Ear: External ear normal.      Nose: Nose normal.      Mouth/Throat:      Mouth: Mucous membranes are moist.   Eyes:      General: No scleral icterus.     Extraocular Movements: EOM normal.   Cardiovascular:      Rate and Rhythm: Normal rate.   Pulmonary:      Effort: Pulmonary effort is normal.   Abdominal:      General: Bowel sounds are normal.   Musculoskeletal:         General: No edema. Normal range of motion.      Cervical back: Normal range of motion.   Skin:     General: Skin is warm.   Neurological:      Mental Status: She is alert and oriented to person, place, and time.   Psychiatric:         Mood and Affect: Mood and affect normal.         Behavior: Behavior normal.         Significant Labs:   All pertinent labs within the past 24 hours have been reviewed.  CBC:   Recent Labs   Lab 02/06/22  0617 02/07/22  0703 02/07/22  1831   WBC 5.15 5.34 5.26   HGB 9.9* 9.5* 11.1*   HCT 32.0* 30.8* 35.4*    255 272     CMP:   Recent Labs   Lab 02/06/22  0617 02/07/22  0703 02/07/22  1831    136 142   K 5.0 5.6* 3.2*    101 107   CO2 25 24 24   GLU 86 82 115*   BUN 39* 49* 19   CREATININE 7.3* 9.3* 3.3*   CALCIUM 8.6* 8.7 9.0   PROT 6.3 6.2 6.9   ALBUMIN 3.0* 3.0* 3.3*   BILITOT 0.6 0.6 0.6   ALKPHOS 55 56 69   AST 15 19 23   ALT 18 24 28   ANIONGAP 12 11 11   EGFRNONAA 4.8* 3.6* 12.5*       Significant Imaging: I have reviewed all pertinent imaging results/findings within the past 24 hours.      Assessment/Plan:      * Chest pain  Developed chest pain during HD session  Recent admission with same c/o and Lexiscan stress test was  normal  Again getting admitted with Chest pain and A Fib  Cardiac cath  On 02/07 showed clean coronaries and no stents were inserted        Atrial fibrillation with RVR  Was on iv cardizem  gtt  Now on amiodarone PO as per Cardiology recommendations      Non-compliance  Prescribed Metoprolol when got admitted few days ago with A Fib RVR  Havent started medicine so far       ESRD on HD via are right upper extremity AVF MWF  HD as per normal schedule      Benign hypertension with ESRD (end-stage renal disease)  Stable         VTE Risk Mitigation (From admission, onward)         Ordered     IP VTE HIGH RISK PATIENT  Once         02/02/22 1648     Place sequential compression device  Until discontinued         02/02/22 1648     Reason for No Pharmacological VTE Prophylaxis  Once        Question:  Reasons:  Answer:  Already adequately anticoagulated on oral Anticoagulants    02/02/22 1648                Discharge Planning   ILAN: 2/8/2022     Code Status: Full Code   Is the patient medically ready for discharge?: No    Reason for patient still in hospital (select all that apply): Treatment  Discharge Plan A: Home                  Ousmane Mcgregor MD  Department of Hospital Medicine   [facility]

## 2022-02-08 NOTE — PROGRESS NOTES
HD tx ended d/t nausea and vomitting. Net UF removed 0.3L.      02/07/22 1830   Post-Hemodialysis Assessment   Rinseback Volume (mL) 250 mL   Blood Volume Processed (Liters) 25.4 L   Dialyzer Clearance Lightly streaked   Duration of Treatment (minutes) 80 minutes   Hemodialysis Intake (mL) 550 mL   Total UF (mL) 812 mL   Net Fluid Removal 262   Patient Response to Treatment VSS   Post-Treatment Weight 57.7 kg (127 lb 3.3 oz)   Treatment Weight Change 57.7   Arterial bleeding stop time (min) 5 min   Venous bleeding stop time (min) 5 min   Post-Hemodialysis Comments Tx ended early d/t n/v

## 2022-02-08 NOTE — PLAN OF CARE
Problem: Fall Injury Risk  Goal: Absence of Fall and Fall-Related Injury  Outcome: Ongoing, Progressing     Problem: Dysrhythmia  Goal: Normalized Cardiac Rhythm  Outcome: Ongoing, Progressing     Problem: Chest Pain  Goal: Resolution of Chest Pain Symptoms  Outcome: Ongoing, Progressing

## 2022-02-08 NOTE — TELEPHONE ENCOUNTER
----- Message from Rosendo Ugarte sent at 2/8/2022  1:42 PM CST -----  Type:  Sooner Apoointment Request    Caller is requesting a sooner appointment.  Caller declined first available appointment listed below.  Caller will not accept being placed on the waitlist and is requesting a message be sent to doctor.  Name of Caller: pt   When is the first available appointment?  Symptoms: follow up angio  Would the patient rather a call back or a response via [employer]? Call back   Best Call Back Number:   Additional Information: requesting sooner appt, call and coordinate

## 2022-02-08 NOTE — SUBJECTIVE & OBJECTIVE
Interval History:     Review of Systems   Constitutional: Negative for activity change and appetite change.   HENT: Negative for congestion and dental problem.    Eyes: Negative for discharge and itching.   Respiratory: Negative for shortness of breath.    Cardiovascular: Negative for chest pain.   Gastrointestinal: Negative for abdominal distention and abdominal pain.   Endocrine: Negative for cold intolerance.   Genitourinary: Negative for difficulty urinating and dysuria.   Musculoskeletal: Negative for arthralgias and back pain.   Skin: Negative for color change.   Neurological: Negative for dizziness and facial asymmetry.   Hematological: Negative for adenopathy.   Psychiatric/Behavioral: Negative for agitation and behavioral problems.     Objective:     Vital Signs (Most Recent):  Temp: 98.6 °F (37 °C) (02/07/22 1830)  Pulse: 63 (02/07/22 1830)  Resp: 18 (02/07/22 1830)  BP: 116/68 (02/07/22 1830)  SpO2: (!) 94 % (02/07/22 1830) Vital Signs (24h Range):  Temp:  [98.2 °F (36.8 °C)-98.9 °F (37.2 °C)] 98.6 °F (37 °C)  Pulse:  [58-69] 63  Resp:  [16-25] 18  SpO2:  [93 %-100 %] 94 %  BP: ()/(51-74) 116/68     Weight: 56 kg (123 lb 7.3 oz)  Body mass index is 21.19 kg/m².    Intake/Output Summary (Last 24 hours) at 2/7/2022 2113  Last data filed at 2/7/2022 1830  Gross per 24 hour   Intake 790 ml   Output 812 ml   Net -22 ml      Physical Exam  Vitals and nursing note reviewed.   Constitutional:       General: She is not in acute distress.  HENT:      Head: Atraumatic.      Right Ear: External ear normal.      Left Ear: External ear normal.      Nose: Nose normal.      Mouth/Throat:      Mouth: Mucous membranes are moist.   Eyes:      General: No scleral icterus.     Extraocular Movements: EOM normal.   Cardiovascular:      Rate and Rhythm: Normal rate.   Pulmonary:      Effort: Pulmonary effort is normal.   Abdominal:      General: Bowel sounds are normal.   Musculoskeletal:         General: No edema. Normal  range of motion.      Cervical back: Normal range of motion.   Skin:     General: Skin is warm.   Neurological:      Mental Status: She is alert and oriented to person, place, and time.   Psychiatric:         Mood and Affect: Mood and affect normal.         Behavior: Behavior normal.         Significant Labs:   All pertinent labs within the past 24 hours have been reviewed.  CBC:   Recent Labs   Lab 02/06/22  0617 02/07/22  0703 02/07/22  1831   WBC 5.15 5.34 5.26   HGB 9.9* 9.5* 11.1*   HCT 32.0* 30.8* 35.4*    255 272     CMP:   Recent Labs   Lab 02/06/22  0617 02/07/22  0703 02/07/22  1831    136 142   K 5.0 5.6* 3.2*    101 107   CO2 25 24 24   GLU 86 82 115*   BUN 39* 49* 19   CREATININE 7.3* 9.3* 3.3*   CALCIUM 8.6* 8.7 9.0   PROT 6.3 6.2 6.9   ALBUMIN 3.0* 3.0* 3.3*   BILITOT 0.6 0.6 0.6   ALKPHOS 55 56 69   AST 15 19 23   ALT 18 24 28   ANIONGAP 12 11 11   EGFRNONAA 4.8* 3.6* 12.5*       Significant Imaging: I have reviewed all pertinent imaging results/findings within the past 24 hours.

## 2022-02-08 NOTE — PLAN OF CARE
Rapid Response called to dialysis    - patient had heart cath today and was npo and then went for dialysis before she could get something to eat. She had some blueberries and then went to dialysis and had an episode of nausea and vomiting. VS have been stable /81. No other signs of hemodynamic instability. Sending CBC, CMP, lactic, and troponin given heart cath today. Heart cath reportedly clean without intervention performed. EKG done at bedside and similar to previous. Will follow up her labs. Likely vasovagal response given procedure and then dialysis this evening. Ok to return to the floor. Will cut dialysis short today. Dr. Valero notified per the nursing staff.

## 2022-02-08 NOTE — PLAN OF CARE
02/08/22 1005   Final Note   Assessment Type Final Discharge Note   Anticipated Discharge Disposition Home   Post-Acute Status   Discharge Delays None known at this time   Chart and discharge orders reviewed.  Patient discharged home with no further case management needs.

## 2022-02-08 NOTE — DISCHARGE SUMMARY
[facility] Medicine  Discharge Summary      Patient Name: Tyra Isaac  MRN: [id number]  Patient Class: IP- Inpatient  Admission Date: 2/2/2022  Hospital Length of Stay: 6 days  Discharge Date and Time:  02/08/2022 5:12 PM  Attending Physician: No att. providers found   Discharging Provider: Ousmane Mcgregor MD  Primary Care Provider: Kalpesh Goel MD      HPI:   No notes on file    Procedure(s) (LRB):  Angiogram, Coronary, with Left Heart Cath (N/A)      Hospital Course:   Patient admitted after she had Chest pain during Dialysis session   She was found to be in A Fib RVR  Pt had Recent Hospital admission with A Fib and was discharged to home with  betablockers(Pt didn't took any meds as instructed)  This Time she was started on Amiodarone for A Fib and her condition came back to baseline  Pt had Left sided Cardiac cath which showed clean coronaries  Later pt was discharged to home        Goals of Care Treatment Preferences:  Code Status: Full Code      Consults:   Consults (From admission, onward)        Status Ordering Provider     Inpatient consult to Nephrology  Once        Provider:  MD Edgardo Joyce SHARMILA          No new Assessment & Plan notes have been filed under this hospital service since the last note was generated.  Service: Hospital Medicine    Final Active Diagnoses:    Diagnosis Date Noted POA    Non-compliance [Z91.19] 02/03/2022 Not Applicable    ESRD on HD via are right upper extremity AVF MWF [N18.6] 12/13/2019 Yes     Chronic    Benign hypertension with ESRD (end-stage renal disease) [I12.0, N18.6] 12/20/2017 Yes      Problems Resolved During this Admission:    Diagnosis Date Noted Date Resolved POA    PRINCIPAL PROBLEM:  Chest pain [R07.9] 02/03/2022 02/08/2022 Unknown    Atrial fibrillation with RVR [I48.91] 02/03/2022 02/08/2022 Unknown    Atrial fibrillation with RVR [I48.91] 03/03/2021 02/03/2022 Unknown       Discharged  Condition: good    Disposition: Home or Self Care    Follow Up:   Follow-up Information     Gino Leal MD.    Specialties: Interventional Cardiology, Cardiology, Cardiovascular Disease  Contact information:  [facility]  [address]  [facility] [address] [phone]                       Patient Instructions:      Protime-INR   Standing Status: Future Standing Exp. Date: 04/05/23     APTT   Standing Status: Future Standing Exp. Date: 04/05/23     Diet Cardiac       Significant Diagnostic Studies: Labs:   CMP   Recent Labs   Lab 02/07/22  0703 02/07/22  1831 02/08/22  0720    142 136   K 5.6* 3.2* 5.7*    107 102   CO2 24 24 22*   GLU 82 115* 71   BUN 49* 19 45*   CREATININE 9.3* 3.3* 8.4*   CALCIUM 8.7 9.0 8.4*   PROT 6.2 6.9 6.2   ALBUMIN 3.0* 3.3* 2.9*   BILITOT 0.6 0.6 0.8   ALKPHOS 56 69 57   AST 19 23 14   ALT 24 28 22   ANIONGAP 11 11 12   ESTGFRAFRICA 4.1* 14.4* 4.7*   EGFRNONAA 3.6* 12.5* 4.0*    and CBC   Recent Labs   Lab 02/07/22  0703 02/07/22  0703 02/07/22 1831 02/07/22 1831 02/08/22  0721   WBC 5.34  --  5.26  --  7.93   HGB 9.5*  --  11.1*  --  9.7*   HCT 30.8*   < > 35.4*   < > 30.0*     --  272  --  236    < > = values in this interval not displayed.       Pending Diagnostic Studies:     Procedure Component Value Units Date/Time    EKG 12-LEAD [[id number]] Collected: 02/07/22 1832    Order Status: Sent Lab Status: In process Updated: 02/08/22 0500    Narrative:      Test Reason :     Vent. Rate : 064 BPM     Atrial Rate : 064 BPM     P-R Int : 180 ms          QRS Dur : 082 ms      QT Int : 492 ms       P-R-T Axes : 084 -03 062 degrees     QTc Int : 507 ms    Normal sinus rhythm  Septal infarct (cited on or before 03-FEB-2022)  Abnormal ECG  When compared with ECG of 03-FEB-2022 12:22,  No significant change was found    Referred By: [email or web address]   SELF           Confirmed By:     EKG 12-LEAD [[id number]]     Order Status: Sent Lab Status: No result     EKG 12-LEAD [[id number]]      Order Status: Sent Lab Status: No result     EKG 12-LEAD [[id number]]     Order Status: Sent Lab Status: No result     EKG 12-LEAD [[id number]] Collected: 02/03/22 1222    Order Status: Sent Lab Status: In process Updated: 02/04/22 0901    Narrative:      Test Reason : R07.9,    Vent. Rate : 063 BPM     Atrial Rate : 063 BPM     P-R Int : 166 ms          QRS Dur : 072 ms      QT Int : 484 ms       P-R-T Axes : 091 032 060 degrees     QTc Int : 495 ms    Normal sinus rhythm  Septal infarct ,age undetermined  Abnormal ECG  When compared with ECG of 02-FEB-2022 11:04,  Significant changes have occurred    Referred By: [email or web address]   SELF           Confirmed By:     EKG 12-LEAD [[id number]]     Order Status: Sent Lab Status: No result          Medications:  Reconciled Home Medications:      Medication List      START taking these medications    amiodarone 200 MG Tab  Commonly known as: PACERONE  Take 1 tablet (200 mg total) by mouth 2 (two) times daily.        CONTINUE taking these medications    amLODIPine 5 MG tablet  Commonly known as: NORVASC  Take 5 mg by mouth once daily.     atorvastatin 40 MG tablet  Commonly known as: LIPITOR  Take 40 mg by mouth once daily.     b complex vitamins tablet  Take 1 tablet by mouth once daily.     calcium acetate(phosphat bind) 667 mg capsule  Commonly known as: PHOSLO  Take 667 mg by mouth Daily.     docusate sodium 100 MG capsule  Commonly known as: COLACE  Take 100 mg by mouth once daily.     dorzolamide-timolol 2-0.5% 22.3-6.8 mg/mL ophthalmic solution  Commonly known as: COSOPT  Place 1 drop into both eyes 2 (two) times daily.     latanoprost 0.005 % ophthalmic solution  Place 1 drop into both eyes every evening.     LIDOcaine 5 % Crea  Apply 1 application topically every Mon, Wed, Fri.     * warfarin 1 MG tablet  Commonly known as: COUMADIN  Take 1 tablet (1 mg total) by mouth Daily. Continue to take coumadin as you were prior to admission with intermittent INR checks and dose  adjustments as needed     * warfarin 2 MG tablet  Commonly known as: COUMADIN  Take 2 mg by mouth every Mon, Wed, Fri.     * warfarin 3 MG tablet  Commonly known as: COUMADIN  Take 3 mg by mouth every Tuesday, Thursday, Saturday, Sunday.         * This list has 3 medication(s) that are the same as other medications prescribed for you. Read the directions carefully, and ask your doctor or other care provider to review them with you.            STOP taking these medications    metoprolol tartrate 25 MG tablet  Commonly known as: LOPRESSOR            Indwelling Lines/Drains at time of discharge:   Lines/Drains/Airways     Drain                 Hemodialysis AV Fistula Right upper arm -- days              Physical Exam  Cardiovascular:      Rate and Rhythm: Normal rate.   Neurological:      Mental Status: She is alert and oriented to person, place, and time.       Time spent on the discharge of patient: 45  minutes         Ousmane Mcgregor MD  Department of Hospital Medicine  [facility]

## 2022-02-08 NOTE — CARE UPDATE
02/08/22 0845   PRE-TX-O2   O2 Device (Oxygen Therapy) room air   Pulse Oximetry Type Continuous   $ Pulse Oximetry - Multiple Charge Pulse Oximetry - Multiple   Resp 15   Respiratory Evaluation   $ Care Plan Tech Time 15 min

## 2022-02-08 NOTE — NURSING
Discharge instructions given and reviewed with patient. Central tele monitoring discontinued.   Questions answered.  PIV x2 removed.  Catheter tip intact.  Pressure applied. Respirations even and unlabored.  Denies pain and discomfort.  Dressing remained in tact to groin.    Pedal pulses remained +2.  Awaiting transportation.

## 2022-02-08 NOTE — ASSESSMENT & PLAN NOTE
Developed chest pain during HD session  Recent admission with same c/o and Lexiscan stress test was normal  Again getting admitted with Chest pain and A Fib  Cardiac cath  On 02/07 showed clean coronaries and no stents were inserted

## 2022-02-09 ENCOUNTER — TELEPHONE (OUTPATIENT)
Dept: FAMILY MEDICINE | Facility: CLINIC | Age: 82
End: 2022-02-09
Payer: MEDICARE

## 2022-02-10 ENCOUNTER — PATIENT OUTREACH (OUTPATIENT)
Dept: FAMILY MEDICINE | Facility: CLINIC | Age: 82
End: 2022-02-10
Payer: MEDICARE

## 2022-02-10 NOTE — PROGRESS NOTES
Discharge Information     Discharge Date:   2/8/22    Primary Discharge Diagnosis:  Chest pain/Afib      Discharge Summary:  Reviewed      Medication & Order Review     Were medication changes made or new medications added?   Yes    If so, has the patient filled the prescriptions?  Yes     Was Home Health ordered? Yes    If so, has Home Health contacted patient and/or initiated services?  Yes    Name of Home Health Agency? not known, talked to [place]    Durable Medical Equipment ordered?  No     If so, has the DME provider contacted patient and delivered equipment?  not applicable    Follow Up               Any problems since discharge? No    How is the patient feeling since returning home?  Feels better    Have you set up recommended follow up appointments?  (cardiology, surgery, etc.) N/A    Schedule Hospital Follow-up appointment within 7-14 days (preferably 7).      Notes:  Dr. Goel 2/14/22  11:00            Lisa Khalil
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
## 2022-02-14 ENCOUNTER — OFFICE VISIT (OUTPATIENT)
Dept: FAMILY MEDICINE | Facility: CLINIC | Age: 82
End: 2022-02-14
Payer: MEDICARE

## 2022-02-14 VITALS
SYSTOLIC BLOOD PRESSURE: 121 MMHG | WEIGHT: 120 LBS | HEART RATE: 75 BPM | DIASTOLIC BLOOD PRESSURE: 56 MMHG | HEIGHT: 64 IN | BODY MASS INDEX: 20.49 KG/M2

## 2022-02-14 DIAGNOSIS — N18.6 BENIGN HYPERTENSION WITH ESRD (END-STAGE RENAL DISEASE): ICD-10-CM

## 2022-02-14 DIAGNOSIS — K29.60 OTHER GASTRITIS WITHOUT HEMORRHAGE, UNSPECIFIED CHRONICITY: ICD-10-CM

## 2022-02-14 DIAGNOSIS — E78.2 MULTIPLE-TYPE HYPERLIPIDEMIA: ICD-10-CM

## 2022-02-14 DIAGNOSIS — R55 SYNCOPE, UNSPECIFIED SYNCOPE TYPE: ICD-10-CM

## 2022-02-14 DIAGNOSIS — N18.6 STAGE 5 CHRONIC KIDNEY DISEASE ON CHRONIC DIALYSIS: ICD-10-CM

## 2022-02-14 DIAGNOSIS — Z99.2 STAGE 5 CHRONIC KIDNEY DISEASE ON CHRONIC DIALYSIS: ICD-10-CM

## 2022-02-14 DIAGNOSIS — I48.0 PAROXYSMAL ATRIAL FIBRILLATION: Primary | ICD-10-CM

## 2022-02-14 DIAGNOSIS — I12.0 BENIGN HYPERTENSION WITH ESRD (END-STAGE RENAL DISEASE): ICD-10-CM

## 2022-02-14 DIAGNOSIS — Z79.01 CHRONIC ANTICOAGULATION: ICD-10-CM

## 2022-02-14 PROCEDURE — 1126F AMNT PAIN NOTED NONE PRSNT: CPT | Mod: S$GLB,,, | Performed by: INTERNAL MEDICINE

## 2022-02-14 PROCEDURE — 99496 TRANSJ CARE MGMT HIGH F2F 7D: CPT | Mod: S$GLB,,, | Performed by: INTERNAL MEDICINE

## 2022-02-14 PROCEDURE — 3288F FALL RISK ASSESSMENT DOCD: CPT | Mod: S$GLB,,, | Performed by: INTERNAL MEDICINE

## 2022-02-14 PROCEDURE — 1159F MED LIST DOCD IN RCRD: CPT | Mod: S$GLB,,, | Performed by: INTERNAL MEDICINE

## 2022-02-14 PROCEDURE — 1159F PR MEDICATION LIST DOCUMENTED IN MEDICAL RECORD: ICD-10-PCS | Mod: S$GLB,,, | Performed by: INTERNAL MEDICINE

## 2022-02-14 PROCEDURE — 1160F PR REVIEW ALL MEDS BY PRESCRIBER/CLIN PHARMACIST DOCUMENTED: ICD-10-PCS | Mod: S$GLB,,, | Performed by: INTERNAL MEDICINE

## 2022-02-14 PROCEDURE — 1126F PR PAIN SEVERITY QUANTIFIED, NO PAIN PRESENT: ICD-10-PCS | Mod: S$GLB,,, | Performed by: INTERNAL MEDICINE

## 2022-02-14 PROCEDURE — 1101F PT FALLS ASSESS-DOCD LE1/YR: CPT | Mod: S$GLB,,, | Performed by: INTERNAL MEDICINE

## 2022-02-14 PROCEDURE — 3288F PR FALLS RISK ASSESSMENT DOCUMENTED: ICD-10-PCS | Mod: S$GLB,,, | Performed by: INTERNAL MEDICINE

## 2022-02-14 PROCEDURE — 99496 TRANSITIONAL CARE MANAGE SERVICE 7 DAY DISCHARGE: ICD-10-PCS | Mod: S$GLB,,, | Performed by: INTERNAL MEDICINE

## 2022-02-14 PROCEDURE — 1160F RVW MEDS BY RX/DR IN RCRD: CPT | Mod: S$GLB,,, | Performed by: INTERNAL MEDICINE

## 2022-02-14 PROCEDURE — 1101F PR PT FALLS ASSESS DOC 0-1 FALLS W/OUT INJ PAST YR: ICD-10-PCS | Mod: S$GLB,,, | Performed by: INTERNAL MEDICINE

## 2022-02-14 RX ORDER — CALCIUM ACETATE 667 MG/1
667 TABLET ORAL 2 TIMES DAILY WITH MEALS
Status: ON HOLD | COMMUNITY
Start: 2021-12-20 | End: 2023-12-15 | Stop reason: HOSPADM

## 2022-02-14 RX ORDER — FAMOTIDINE 20 MG/1
20 TABLET, FILM COATED ORAL NIGHTLY PRN
Qty: 30 TABLET | Refills: 5 | Status: SHIPPED | OUTPATIENT
Start: 2022-02-14 | End: 2023-06-28

## 2022-02-14 RX ORDER — METOPROLOL TARTRATE 25 MG/1
25 TABLET, FILM COATED ORAL 2 TIMES DAILY
COMMUNITY
End: 2022-02-14 | Stop reason: ALTCHOICE

## 2022-02-18 ENCOUNTER — OFFICE VISIT (OUTPATIENT)
Dept: CARDIOLOGY | Facility: CLINIC | Age: 82
End: 2022-02-18
Payer: MEDICARE

## 2022-02-18 VITALS
DIASTOLIC BLOOD PRESSURE: 60 MMHG | SYSTOLIC BLOOD PRESSURE: 124 MMHG | HEART RATE: 64 BPM | OXYGEN SATURATION: 99 % | WEIGHT: 121 LBS | HEIGHT: 64 IN | BODY MASS INDEX: 20.66 KG/M2

## 2022-02-18 DIAGNOSIS — I12.0: ICD-10-CM

## 2022-02-18 DIAGNOSIS — D63.8 ANEMIA OF CHRONIC DISEASE: Chronic | ICD-10-CM

## 2022-02-18 DIAGNOSIS — I25.118 CORONARY ARTERY DISEASE OF NATIVE HEART WITH STABLE ANGINA PECTORIS, UNSPECIFIED VESSEL OR LESION TYPE: ICD-10-CM

## 2022-02-18 DIAGNOSIS — Z79.01 LONG TERM (CURRENT) USE OF ANTICOAGULANTS: ICD-10-CM

## 2022-02-18 DIAGNOSIS — N18.6 ESRD (END STAGE RENAL DISEASE): Primary | Chronic | ICD-10-CM

## 2022-02-18 DIAGNOSIS — I48.0 PAROXYSMAL ATRIAL FIBRILLATION: ICD-10-CM

## 2022-02-18 PROCEDURE — 99214 PR OFFICE/OUTPT VISIT, EST, LEVL IV, 30-39 MIN: ICD-10-PCS | Mod: S$GLB,,, | Performed by: NURSE PRACTITIONER

## 2022-02-18 PROCEDURE — 3078F PR MOST RECENT DIASTOLIC BLOOD PRESSURE < 80 MM HG: ICD-10-PCS | Mod: CPTII,S$GLB,, | Performed by: NURSE PRACTITIONER

## 2022-02-18 PROCEDURE — 1111F DSCHRG MED/CURRENT MED MERGE: CPT | Mod: CPTII,S$GLB,, | Performed by: NURSE PRACTITIONER

## 2022-02-18 PROCEDURE — 99214 OFFICE O/P EST MOD 30 MIN: CPT | Mod: S$GLB,,, | Performed by: NURSE PRACTITIONER

## 2022-02-18 PROCEDURE — 1126F PR PAIN SEVERITY QUANTIFIED, NO PAIN PRESENT: ICD-10-PCS | Mod: CPTII,S$GLB,, | Performed by: NURSE PRACTITIONER

## 2022-02-18 PROCEDURE — 3074F SYST BP LT 130 MM HG: CPT | Mod: CPTII,S$GLB,, | Performed by: NURSE PRACTITIONER

## 2022-02-18 PROCEDURE — 1126F AMNT PAIN NOTED NONE PRSNT: CPT | Mod: CPTII,S$GLB,, | Performed by: NURSE PRACTITIONER

## 2022-02-18 PROCEDURE — 1101F PR PT FALLS ASSESS DOC 0-1 FALLS W/OUT INJ PAST YR: ICD-10-PCS | Mod: CPTII,S$GLB,, | Performed by: NURSE PRACTITIONER

## 2022-02-18 PROCEDURE — 3288F FALL RISK ASSESSMENT DOCD: CPT | Mod: CPTII,S$GLB,, | Performed by: NURSE PRACTITIONER

## 2022-02-18 PROCEDURE — 1111F PR DISCHARGE MEDS RECONCILED W/ CURRENT OUTPATIENT MED LIST: ICD-10-PCS | Mod: CPTII,S$GLB,, | Performed by: NURSE PRACTITIONER

## 2022-02-18 PROCEDURE — 1159F PR MEDICATION LIST DOCUMENTED IN MEDICAL RECORD: ICD-10-PCS | Mod: CPTII,S$GLB,, | Performed by: NURSE PRACTITIONER

## 2022-02-18 PROCEDURE — 1101F PT FALLS ASSESS-DOCD LE1/YR: CPT | Mod: CPTII,S$GLB,, | Performed by: NURSE PRACTITIONER

## 2022-02-18 PROCEDURE — 3288F PR FALLS RISK ASSESSMENT DOCUMENTED: ICD-10-PCS | Mod: CPTII,S$GLB,, | Performed by: NURSE PRACTITIONER

## 2022-02-18 PROCEDURE — 3074F PR MOST RECENT SYSTOLIC BLOOD PRESSURE < 130 MM HG: ICD-10-PCS | Mod: CPTII,S$GLB,, | Performed by: NURSE PRACTITIONER

## 2022-02-18 PROCEDURE — 3078F DIAST BP <80 MM HG: CPT | Mod: CPTII,S$GLB,, | Performed by: NURSE PRACTITIONER

## 2022-02-18 PROCEDURE — 1159F MED LIST DOCD IN RCRD: CPT | Mod: CPTII,S$GLB,, | Performed by: NURSE PRACTITIONER

## 2022-02-18 NOTE — PROGRESS NOTES
Subjective:    Patient ID:  Tyra Isaac is a 81 y.o. female     HPI:  Patient presents today for follow up post hospitalization for chest pain and syncope. Patient underwent coronary angiogram and had no significant blockages. She has been doing well otherwise and going to dialysis as scheduled.     Review of patient's allergies indicates:   Allergen Reactions    Cyclobenzaprine     Fish containing products Hives    Peanut     Tramadol Itching       Past Medical History:   Diagnosis Date    A-fib     Anxiety     Depression     Disorder of kidney and ureter     Encephalopathy acute 1/1/2018    End stage kidney disease 6/17/2017    Gout     Hyperlipidemia     Hypertension     Moderate episode of recurrent major depressive disorder 1/17/2018    Nephropathy hypertensive, stage 5 chronic kidney disease or end stage renal disease 6/17/2017    Obstructive pattern present on pulmonary function testing 7/28/2021    Shows moderate obstruction.    Osteopenia of multiple sites 3/9/2018    Based upon bone density measurements. Patient also has chronic kidney disease.    Stroke 11/2016     Past Surgical History:   Procedure Laterality Date    ANGIOGRAM, CORONARY, WITH LEFT HEART CATHETERIZATION N/A 2/7/2022    Procedure: Angiogram, Coronary, with Left Heart Cath;  Surgeon: Gino Leal MD;  Location: [facility] CATH/EP LAB;  Service: Cardiology;  Laterality: N/A;    CARDIAC SURGERY      stents    WRIST SURGERY       Social History     Tobacco Use    Smoking status: Never Smoker    Smokeless tobacco: Never Used   Substance Use Topics    Alcohol use: No    Drug use: No     Family History   Problem Relation Age of Onset    Heart disease Mother     Cancer Father         Review of Systems:   Constitution: Negative for diaphoresis and fever.   HEENT: Negative for nosebleeds.    Cardiovascular: Negative for chest pain       No dyspnea on exertion       No leg swelling        No  palpitations  Respiratory: Negative for shortness of breath and wheezing.    Hematologic/Lymphatic: Negative for bleeding problem. Does not bruise/bleed easily.   Skin: Negative for color change and rash.   Musculoskeletal: Negative for falls and myalgias.   Gastrointestinal: Negative for hematemesis and hematochezia.   Genitourinary: Negative for hematuria.   Neurological: Negative for dizziness and light-headedness.   Psychiatric/Behavioral: Negative for altered mental status and memory loss.          Objective:        There were no vitals filed for this visit.    Lab Results   Component Value Date    WBC 7.93 02/08/2022    HGB 9.7 (L) 02/08/2022    HCT 30.0 (L) 02/08/2022     02/08/2022    ALT 22 02/08/2022    AST 14 02/08/2022     02/08/2022    K 5.7 (H) 02/08/2022     02/08/2022    CREATININE 8.4 (H) 02/08/2022    BUN 45 (H) 02/08/2022    CO2 22 (L) 02/08/2022    TSH 1.440 03/03/2021    INR 1.1 02/08/2022    HGBA1C 5.3 01/26/2022        ECHOCARDIOGRAM RESULTS  Results for orders placed during the hospital encounter of 01/26/22    Echo    Interpretation Summary  · The left ventricle is normal in size with mild concentric hypertrophy and normal systolic function.  · The estimated ejection fraction is 65%.  · Grade II left ventricular diastolic dysfunction.  · Atrial fibrillation not observed.  · Normal right ventricular size with normal right ventricular systolic function.  · Moderate to severe left atrial enlargement.  · Mild right atrial enlargement.  · Mild mitral regurgitation.  · Mild to moderate tricuspid regurgitation.  · Normal central venous pressure (3 mmHg).  · The estimated PA systolic pressure is 36 mmHg.  · Mildly elevated gradient accross mitral valve of around 6 mmHg at HR of 64 bpm. MVA by pressure half time is normal, however this can be inaccurate.        CURRENT/PREVIOUS VISIT EKG  Results for orders placed or performed during the hospital encounter of 02/02/22   EKG 12-LEAD     Collection Time: 02/07/22  6:32 PM    Narrative    Test Reason :     Vent. Rate : 064 BPM     Atrial Rate : 064 BPM     P-R Int : 180 ms          QRS Dur : 082 ms      QT Int : 492 ms       P-R-T Axes : 084 -03 062 degrees     QTc Int : 507 ms    Normal sinus rhythm  Septal infarct (cited on or before 03-FEB-2022)  Abnormal ECG  When compared with ECG of 03-FEB-2022 12:22,  No significant change was found  Confirmed by Denzel Cano MD ([id number]) on 2/16/2022 10:40:13 PM    Referred By: KUNAL   SELF           Confirmed By:Denzel Cano MD     No valid procedures specified.   Results for orders placed during the hospital encounter of 01/26/22    Nuclear Stress Test    Interpretation Summary    The EKG portion of this study is negative for ischemia.    The patient reported no chest pain during the stress test.    There were no arrhythmias during stress.    The nuclear portion of this study will be reported separately.      Physical Exam:  CONSTITUTIONAL: No fever, no chills  HEENT: Normocephalic, atraumatic,pupils reactive to light                 NECK:  No JVD no carotid bruit  CVS: S1S2+, RRR  LUNGS: Clear  ABDOMEN: Soft, NT, BS+  EXTREMITIES: No cyanosis, edema  : No diaz catheter  NEURO: AAO X 3  PSY: Normal affect      Medication List with Changes/Refills   Current Medications    AMIODARONE (PACERONE) 200 MG TAB    Take 1 tablet (200 mg total) by mouth 2 (two) times daily.    AMLODIPINE (NORVASC) 5 MG TABLET    Take 5 mg by mouth once daily.    ATORVASTATIN (LIPITOR) 40 MG TABLET    Take 40 mg by mouth once daily.    B COMPLEX VITAMINS TABLET    Take 1 tablet by mouth once daily.    CALCIUM ACETATE,PHOSPHAT BIND, (PHOSLO) 667 MG TABLET    Take 667 mg by mouth 2 (two) times daily with meals.    DOCUSATE SODIUM (COLACE) 100 MG CAPSULE    Take 100 mg by mouth once daily.     DORZOLAMIDE-TIMOLOL 2-0.5% (COSOPT) 22.3-6.8 MG/ML OPHTHALMIC SOLUTION    Place 1 drop into both eyes 2 (two) times daily.      FAMOTIDINE (PEPCID) 20 MG TABLET    Take 1 tablet (20 mg total) by mouth nightly as needed for Heartburn.    LATANOPROST 0.005 % OPHTHALMIC SOLUTION    Place 1 drop into both eyes every evening.     LIDOCAINE 5 % CREA    Apply 1 application topically every Mon, Wed, Fri.     WARFARIN (COUMADIN) 1 MG TABLET    Take 1 tablet (1 mg total) by mouth Daily. Continue to take coumadin as you were prior to admission with intermittent INR checks and dose adjustments as needed    WARFARIN (COUMADIN) 2 MG TABLET    Take 2 mg by mouth every Mon, Wed, Fri.    WARFARIN (COUMADIN) 3 MG TABLET    Take 3 mg by mouth every Tuesday, Thursday, Saturday, Sunday.             Assessment:       1. ESRD on HD via are right upper extremity AVF MWF    2. Anemia of chronic disease    3. 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 Paroxysmal atrial fibrillation    5. Coronary artery disease of native heart with stable angina pectoris, unspecified vessel or lesion type    6. Long term (current) use of anticoagulants         Plan:     1. Groin is soft with no swelling or tenderness.   2. Recommend continuing with current medications.   Vitals stable.   3. Follow up with primary cardiologist as recommended.     Problem List Items Addressed This Visit        Unprioritized    ESRD on HD via are right upper extremity AVF MWF - Primary (Chronic)    Anemia of chronic disease (Chronic)    Arteriosclerosis of kidney, stage 5 chronic kidney disease or end stage renal disease    Paroxysmal atrial fibrillation    Coronary artery disease of native heart with stable angina pectoris    Long term (current) use of anticoagulants          No follow-ups on file.

## 2022-05-18 ENCOUNTER — TELEPHONE (OUTPATIENT)
Dept: FAMILY MEDICINE | Facility: CLINIC | Age: 82
End: 2022-05-18

## 2022-05-18 DIAGNOSIS — Z12.31 ENCOUNTER FOR SCREENING MAMMOGRAM FOR BREAST CANCER: Primary | ICD-10-CM

## 2022-05-18 NOTE — TELEPHONE ENCOUNTER
----- Message from Sweta Campbell sent at 5/18/2022  2:10 PM CDT -----  Regarding: Mammo  Pt called requesting for Mammo orders.      Thank you!

## 2022-06-01 ENCOUNTER — HOSPITAL ENCOUNTER (OUTPATIENT)
Dept: RADIOLOGY | Facility: HOSPITAL | Age: 82
Discharge: HOME | End: 2022-06-01
Attending: INTERNAL MEDICINE
Payer: MEDICARE

## 2022-06-01 DIAGNOSIS — Z12.31 ENCOUNTER FOR SCREENING MAMMOGRAM FOR BREAST CANCER: ICD-10-CM

## 2022-06-01 PROCEDURE — 77067 SCR MAMMO BI INCL CAD: CPT | Mod: TC,PO

## 2022-06-02 ENCOUNTER — TELEPHONE (OUTPATIENT)
Dept: FAMILY MEDICINE | Facility: CLINIC | Age: 82
End: 2022-06-02

## 2022-06-02 NOTE — TELEPHONE ENCOUNTER
----- Message from Kalpesh Goel MD sent at 6/2/2022  6:20 AM CDT -----  Please notify patient or patient's son that mammogram had shown an area of concern for which the Radiology Department should contact her for further imaging.  Not to worry at this point.

## 2022-06-15 ENCOUNTER — OFFICE VISIT (OUTPATIENT)
Dept: FAMILY MEDICINE | Facility: CLINIC | Age: 82
End: 2022-06-15
Payer: MEDICARE

## 2022-06-15 VITALS
BODY MASS INDEX: 19.81 KG/M2 | WEIGHT: 116 LBS | HEART RATE: 61 BPM | DIASTOLIC BLOOD PRESSURE: 61 MMHG | HEIGHT: 64 IN | SYSTOLIC BLOOD PRESSURE: 139 MMHG

## 2022-06-15 DIAGNOSIS — I48.0 PAROXYSMAL ATRIAL FIBRILLATION: Primary | Chronic | ICD-10-CM

## 2022-06-15 DIAGNOSIS — Z87.898 HISTORY OF SYNCOPE: ICD-10-CM

## 2022-06-15 DIAGNOSIS — Z79.01 CHRONIC ANTICOAGULATION: ICD-10-CM

## 2022-06-15 DIAGNOSIS — N18.6 STAGE 5 CHRONIC KIDNEY DISEASE ON CHRONIC DIALYSIS: Chronic | ICD-10-CM

## 2022-06-15 DIAGNOSIS — I12.0 BENIGN HYPERTENSION WITH ESRD (END-STAGE RENAL DISEASE): Chronic | ICD-10-CM

## 2022-06-15 DIAGNOSIS — G31.84 MILD COGNITIVE IMPAIRMENT: Chronic | ICD-10-CM

## 2022-06-15 DIAGNOSIS — I25.118 CORONARY ARTERY DISEASE OF NATIVE HEART WITH STABLE ANGINA PECTORIS, UNSPECIFIED VESSEL OR LESION TYPE: ICD-10-CM

## 2022-06-15 DIAGNOSIS — Z99.2 STAGE 5 CHRONIC KIDNEY DISEASE ON CHRONIC DIALYSIS: Chronic | ICD-10-CM

## 2022-06-15 DIAGNOSIS — E78.2 MULTIPLE-TYPE HYPERLIPIDEMIA: ICD-10-CM

## 2022-06-15 DIAGNOSIS — N18.6 BENIGN HYPERTENSION WITH ESRD (END-STAGE RENAL DISEASE): Chronic | ICD-10-CM

## 2022-06-15 PROCEDURE — 1160F RVW MEDS BY RX/DR IN RCRD: CPT | Mod: CPTII,S$GLB,, | Performed by: INTERNAL MEDICINE

## 2022-06-15 PROCEDURE — 3075F PR MOST RECENT SYSTOLIC BLOOD PRESS GE 130-139MM HG: ICD-10-PCS | Mod: CPTII,S$GLB,, | Performed by: INTERNAL MEDICINE

## 2022-06-15 PROCEDURE — 1159F MED LIST DOCD IN RCRD: CPT | Mod: CPTII,S$GLB,, | Performed by: INTERNAL MEDICINE

## 2022-06-15 PROCEDURE — 1101F PT FALLS ASSESS-DOCD LE1/YR: CPT | Mod: CPTII,S$GLB,, | Performed by: INTERNAL MEDICINE

## 2022-06-15 PROCEDURE — 1126F AMNT PAIN NOTED NONE PRSNT: CPT | Mod: CPTII,S$GLB,, | Performed by: INTERNAL MEDICINE

## 2022-06-15 PROCEDURE — 3288F FALL RISK ASSESSMENT DOCD: CPT | Mod: CPTII,S$GLB,, | Performed by: INTERNAL MEDICINE

## 2022-06-15 PROCEDURE — 99214 OFFICE O/P EST MOD 30 MIN: CPT | Mod: S$GLB,,, | Performed by: INTERNAL MEDICINE

## 2022-06-15 PROCEDURE — 3288F PR FALLS RISK ASSESSMENT DOCUMENTED: ICD-10-PCS | Mod: CPTII,S$GLB,, | Performed by: INTERNAL MEDICINE

## 2022-06-15 PROCEDURE — 3078F DIAST BP <80 MM HG: CPT | Mod: CPTII,S$GLB,, | Performed by: INTERNAL MEDICINE

## 2022-06-15 PROCEDURE — 1159F PR MEDICATION LIST DOCUMENTED IN MEDICAL RECORD: ICD-10-PCS | Mod: CPTII,S$GLB,, | Performed by: INTERNAL MEDICINE

## 2022-06-15 PROCEDURE — 3075F SYST BP GE 130 - 139MM HG: CPT | Mod: CPTII,S$GLB,, | Performed by: INTERNAL MEDICINE

## 2022-06-15 PROCEDURE — 1160F PR REVIEW ALL MEDS BY PRESCRIBER/CLIN PHARMACIST DOCUMENTED: ICD-10-PCS | Mod: CPTII,S$GLB,, | Performed by: INTERNAL MEDICINE

## 2022-06-15 PROCEDURE — 99214 PR OFFICE/OUTPT VISIT, EST, LEVL IV, 30-39 MIN: ICD-10-PCS | Mod: S$GLB,,, | Performed by: INTERNAL MEDICINE

## 2022-06-15 PROCEDURE — 3078F PR MOST RECENT DIASTOLIC BLOOD PRESSURE < 80 MM HG: ICD-10-PCS | Mod: CPTII,S$GLB,, | Performed by: INTERNAL MEDICINE

## 2022-06-15 PROCEDURE — 1101F PR PT FALLS ASSESS DOC 0-1 FALLS W/OUT INJ PAST YR: ICD-10-PCS | Mod: CPTII,S$GLB,, | Performed by: INTERNAL MEDICINE

## 2022-06-15 PROCEDURE — 1126F PR PAIN SEVERITY QUANTIFIED, NO PAIN PRESENT: ICD-10-PCS | Mod: CPTII,S$GLB,, | Performed by: INTERNAL MEDICINE

## 2022-06-15 RX ORDER — DILTIAZEM HYDROCHLORIDE 120 MG/1
120 CAPSULE, COATED, EXTENDED RELEASE ORAL 2 TIMES DAILY
Status: ON HOLD | COMMUNITY
Start: 2022-02-28 | End: 2023-03-07 | Stop reason: SDUPTHER
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
## 2022-06-17 ENCOUNTER — HOSPITAL ENCOUNTER (OUTPATIENT)
Dept: RADIOLOGY | Facility: HOSPITAL | Age: 82
Discharge: HOME | End: 2022-06-17
Attending: INTERNAL MEDICINE
Payer: MEDICARE

## 2022-06-17 DIAGNOSIS — R92.2 INCONCLUSIVE MAMMOGRAM: ICD-10-CM

## 2022-06-17 PROCEDURE — 77065 DX MAMMO INCL CAD UNI: CPT | Mod: TC,PO,RT

## 2022-07-14 ENCOUNTER — TELEPHONE (OUTPATIENT)
Dept: FAMILY MEDICINE | Facility: CLINIC | Age: 82
End: 2022-07-14

## 2022-07-14 NOTE — TELEPHONE ENCOUNTER
Attempted calling patient and son to discuss the mammogram findings.  Nobody picked the phone.  No message in system.  Send patient message through the portal also.  Will send regularly male also.  Patient does not have a active chart.

## 2022-07-15 ENCOUNTER — TELEPHONE (OUTPATIENT)
Dept: FAMILY MEDICINE | Facility: CLINIC | Age: 82
End: 2022-07-15

## 2022-07-15 NOTE — TELEPHONE ENCOUNTER
After hearing from Dr. Anthony Espinoza about the mammogram findings, I did finally get in touch with patient on Friday the 15th of July at 2:30 p.m..    I had made a couple of attempts earlier this week to get in touch with the patient as well as her son and another attempt to get in touch with the son and none of them had answered my phone earlier.    Basically I reiterated the fact that mammogram was abnormal and suspicious for a cancer and stereotactic biopsy was recommended.  For some technical reasons because of small breast this could not be performed successfully.    Now specifically this does not mean that we can wait for 6 months and see what is going on with the mass lesion which may grow in size and thus increase the cancer burden especially if she would like to be treated earlier.  This may make a difference between a very treatable cancer or untreatable cancer.    The next strategy which that would be appropriate would be a surgery or surgical biopsy to isolated this lesion and find out what is going on.    Surgical biopsy itself is not a very complicated procedure.    After that she needs to decide as to what degree of treatment and intervention she would like to have but will cross that bridge later on especially if it turns out to be a cancer.    Patient's son was not available at home today and will try to reach out to him again on Monday.    Patient understands the basics and potential of a cancer diagnosis but deeper thought and cognition might be somewhat lacking at this station and age of life.

## 2022-07-27 ENCOUNTER — OFFICE VISIT (OUTPATIENT)
Dept: SURGERY | Facility: CLINIC | Age: 82
End: 2022-07-27
Payer: MEDICARE

## 2022-07-27 VITALS — TEMPERATURE: 99 F | DIASTOLIC BLOOD PRESSURE: 77 MMHG | HEART RATE: 103 BPM | SYSTOLIC BLOOD PRESSURE: 165 MMHG

## 2022-07-27 DIAGNOSIS — R92.1 CALCIFICATION OF RIGHT BREAST: Primary | ICD-10-CM

## 2022-07-27 PROCEDURE — 3288F PR FALLS RISK ASSESSMENT DOCUMENTED: ICD-10-PCS | Mod: CPTII,S$GLB,, | Performed by: SURGERY

## 2022-07-27 PROCEDURE — 1100F PTFALLS ASSESS-DOCD GE2>/YR: CPT | Mod: CPTII,S$GLB,, | Performed by: SURGERY

## 2022-07-27 PROCEDURE — 3288F FALL RISK ASSESSMENT DOCD: CPT | Mod: CPTII,S$GLB,, | Performed by: SURGERY

## 2022-07-27 PROCEDURE — 99204 OFFICE O/P NEW MOD 45 MIN: CPT | Mod: S$GLB,,, | Performed by: SURGERY

## 2022-07-27 PROCEDURE — 1100F PR PT FALLS ASSESS DOC 2+ FALLS/FALL W/INJURY/YR: ICD-10-PCS | Mod: CPTII,S$GLB,, | Performed by: SURGERY

## 2022-07-27 PROCEDURE — 3078F DIAST BP <80 MM HG: CPT | Mod: CPTII,S$GLB,, | Performed by: SURGERY

## 2022-07-27 PROCEDURE — 99204 PR OFFICE/OUTPT VISIT, NEW, LEVL IV, 45-59 MIN: ICD-10-PCS | Mod: S$GLB,,, | Performed by: SURGERY

## 2022-07-27 PROCEDURE — 3077F PR MOST RECENT SYSTOLIC BLOOD PRESSURE >= 140 MM HG: ICD-10-PCS | Mod: CPTII,S$GLB,, | Performed by: SURGERY

## 2022-07-27 PROCEDURE — 3077F SYST BP >= 140 MM HG: CPT | Mod: CPTII,S$GLB,, | Performed by: SURGERY

## 2022-07-27 PROCEDURE — 1126F PR PAIN SEVERITY QUANTIFIED, NO PAIN PRESENT: ICD-10-PCS | Mod: CPTII,S$GLB,, | Performed by: SURGERY

## 2022-07-27 PROCEDURE — 3078F PR MOST RECENT DIASTOLIC BLOOD PRESSURE < 80 MM HG: ICD-10-PCS | Mod: CPTII,S$GLB,, | Performed by: SURGERY

## 2022-07-27 PROCEDURE — 1126F AMNT PAIN NOTED NONE PRSNT: CPT | Mod: CPTII,S$GLB,, | Performed by: SURGERY

## 2022-07-27 NOTE — H&P
GENERAL SURGERY  OUTPATIENT H&P    REASON FOR VISIT/CC:  Right breast calcifications    HPI: Tyra Isaac is a 82 y.o. female who underwent screening mammography which noted increasing calcifications on the right breast.  She was sent diagnostic imaging showing findings of suspicious and categorized as BI-RADS 4. However given the location and the patient's breast anatomy she is not a candidate for stereotactic biopsy.  She was sent to General surgery for evaluation of surgical excisional biopsy.  Patient denies any palpable breast lesion.  No nipple discharge or changes.  No personal or family history of breast cancer.  Patient has never had a breast biopsy in the past.    I have reviewed the patient's chart including prior progress notes, procedures and testing.     ROS:   Review of Systems   Constitutional: Negative for activity change, chills and fever.   HENT: Negative for trouble swallowing.    Respiratory: Negative for apnea, chest tightness and shortness of breath.    Cardiovascular: Negative for chest pain, palpitations and leg swelling.   Gastrointestinal: Negative for abdominal distention, abdominal pain, nausea and vomiting.   Genitourinary: Negative for difficulty urinating, dysuria and hematuria.   Musculoskeletal: Negative for arthralgias, neck pain and neck stiffness.   Skin: Negative for color change and wound.   Neurological: Negative for dizziness, weakness and light-headedness.       PROBLEM LIST:  Patient Active Problem List   Diagnosis    Benign hypertension with ESRD (end-stage renal disease)    Body mass index (BMI) of 22.0-22.9 in adult    Calculus of gallbladder    Chronic gouty arthropathy    Chronic kidney disease, stage 4 (severe)    Dysthymia    Multiple-type hyperlipidemia    Non-smoker    Encephalopathy acute    Nephropathy hypertensive, stage 5 chronic kidney disease or end stage renal disease    Moderate episode of recurrent major depressive disorder    Osteopenia  of multiple sites    Arteriosclerosis of kidney, stage 5 chronic kidney disease or end stage renal disease    Paroxysmal atrial fibrillation    DDD (degenerative disc disease), cervical    Cervical spondylosis    Bilateral shoulder bursitis    Spinal stenosis in cervical region    Cervical herniation    Bilateral carpal tunnel syndrome    Cubital tunnel syndrome on right    Numbness and tingling in right hand    Stage 5 chronic kidney disease on chronic dialysis    Pneumococcal vaccine administered    Dependence on renal dialysis    Coronary artery disease of native heart with stable angina pectoris    Atrial fibrillation with RVR with history of known paroxysmal atrial fibrillation    ESRD on HD via are right upper extremity AVF MWF    Long term (current) use of anticoagulants    Syncope    Generalized weakness    End stage renal disease on dialysis    Obstructive pattern present on pulmonary function testing    Elevated troponin    Other social stressor    Anemia of chronic disease    Anxiety and depression    Non-compliance         HISTORY  Past Medical History:   Diagnosis Date    A-fib     Anxiety     Depression     Disorder of kidney and ureter     Encephalopathy acute 1/1/2018    End stage kidney disease 6/17/2017    Gout     Hyperlipidemia     Hypertension     Moderate episode of recurrent major depressive disorder 1/17/2018    Nephropathy hypertensive, stage 5 chronic kidney disease or end stage renal disease 6/17/2017    Obstructive pattern present on pulmonary function testing 7/28/2021    Shows moderate obstruction.    Osteopenia of multiple sites 3/9/2018    Based upon bone density measurements. Patient also has chronic kidney disease.    Stroke 11/2016       Past Surgical History:   Procedure Laterality Date    ANGIOGRAM, CORONARY, WITH LEFT HEART CATHETERIZATION N/A 2/7/2022    Procedure: Angiogram, Coronary, with Left Heart Cath;  Surgeon: Gino Leal,  MD;  Location: [facility] CATH/EP LAB;  Service: Cardiology;  Laterality: N/A;    CARDIAC SURGERY      stents    EYE SURGERY      WRIST SURGERY         Social History     Tobacco Use    Smoking status: Never Smoker    Smokeless tobacco: Never Used   Substance Use Topics    Alcohol use: No    Drug use: No       Family History   Problem Relation Age of Onset    Heart disease Mother     Cancer Father          MEDS:  Current Outpatient Medications on File Prior to Visit   Medication Sig Dispense Refill    amLODIPine (NORVASC) 5 MG tablet Take 5 mg by mouth once daily.      atorvastatin (LIPITOR) 40 MG tablet Take 40 mg by mouth once daily.      b complex vitamins tablet Take 1 tablet by mouth once daily.      calcium acetate,phosphat bind, (PHOSLO) 667 mg tablet Take 667 mg by mouth 2 (two) times daily with meals.      diltiaZEM (CARDIZEM CD) 120 MG Cp24 Take 120 mg by mouth 2 (two) times daily.      docusate sodium (COLACE) 100 MG capsule Take 100 mg by mouth once daily.       dorzolamide-timolol 2-0.5% (COSOPT) 22.3-6.8 mg/mL ophthalmic solution Place 1 drop into both eyes 2 (two) times daily.       famotidine (PEPCID) 20 MG tablet Take 1 tablet (20 mg total) by mouth nightly as needed for Heartburn. 30 tablet 5    latanoprost 0.005 % ophthalmic solution Place 1 drop into both eyes every evening.       lidocaine 5 % Crea Apply 1 application topically every Mon, Wed, Fri.       warfarin (COUMADIN) 2 MG tablet Take 2 mg by mouth every Mon, Wed, Fri.      warfarin (COUMADIN) 3 MG tablet Take 3 mg by mouth every Tuesday, Thursday, Saturday, Sunday.      amiodarone (PACERONE) 200 MG Tab Take 1 tablet (200 mg total) by mouth 2 (two) times daily. 60 tablet 0     No current facility-administered medications on file prior to visit.       ALLERGIES:  Review of patient's allergies indicates:   Allergen Reactions    Cyclobenzaprine     Fish containing products Hives    Peanut     Tramadol Itching          VITALS:  Vitals:    07/27/22 1050   BP: (!) 165/77   Pulse: 103   Temp: 99.3 °F (37.4 °C)         PHYSICAL EXAM:  Physical Exam  Vitals reviewed.   Constitutional:       General: She is not in acute distress.     Appearance: Normal appearance. She is well-developed.   HENT:      Head: Normocephalic and atraumatic.      Nose: Nose normal.   Eyes:      General: No scleral icterus.     Conjunctiva/sclera: Conjunctivae normal.   Neck:      Trachea: No tracheal tenderness or tracheal deviation.   Cardiovascular:      Rate and Rhythm: Normal rate and regular rhythm.      Pulses: Normal pulses.   Pulmonary:      Effort: Pulmonary effort is normal. No accessory muscle usage or respiratory distress.      Breath sounds: Normal breath sounds.   Chest:   Breasts:      Right: No mass, nipple discharge, skin change, tenderness, axillary adenopathy or supraclavicular adenopathy.      Left: No mass, nipple discharge, skin change, tenderness, axillary adenopathy or supraclavicular adenopathy.        Comments: Small breast size with minimal fatty breast tissue  Abdominal:      General: There is no distension.      Palpations: Abdomen is soft.      Tenderness: There is no abdominal tenderness.   Musculoskeletal:         General: No swelling or tenderness. Normal range of motion.      Cervical back: Normal range of motion and neck supple. No rigidity.   Lymphadenopathy:      Upper Body:      Right upper body: No supraclavicular or axillary adenopathy.      Left upper body: No supraclavicular or axillary adenopathy.   Skin:     General: Skin is warm and dry.      Coloration: Skin is not jaundiced.      Findings: No erythema.   Neurological:      General: No focal deficit present.      Mental Status: She is alert and oriented to person, place, and time.      Motor: No weakness or abnormal muscle tone.   Psychiatric:         Mood and Affect: Mood normal.         Behavior: Behavior normal.         Thought Content: Thought content  normal.         Judgment: Judgment normal.           LABS:  Lab Results   Component Value Date    WBC 7.93 02/08/2022    RBC 3.13 (L) 02/08/2022    HGB 9.7 (L) 02/08/2022    HCT 30.0 (L) 02/08/2022     02/08/2022     Lab Results   Component Value Date    GLU 71 02/08/2022     02/08/2022    K 5.7 (H) 02/08/2022     02/08/2022    CO2 22 (L) 02/08/2022    BUN 45 (H) 02/08/2022    CREATININE 8.4 (H) 02/08/2022    CALCIUM 8.4 (L) 02/08/2022     Lab Results   Component Value Date    ALT 22 02/08/2022    AST 14 02/08/2022    ALKPHOS 57 02/08/2022    BILITOT 0.8 02/08/2022     Lab Results   Component Value Date    MG 2.0 02/03/2022    PHOS 3.6 02/03/2022       STUDIES:  Images and reports were personally reviewed.  Screening MMG  FINDINGS:  Routine and tomosynthesis images were obtained.  Breasts dense bilaterally which may lower mammographic sensitivity.  Small nodular density the anterior right breast lateral to line is unchanged.  No foci of architectural distortion identified.  Clustered microcalcifications in the posterior right breast at and just above the nipple line on the MLO view appear increased.  Right diagnostic mammography to include magnification images is recommended..     Impression:     1. Right diagnostic mammography is recommended for further assessment increasing clustered microcalcifications in the posterior right breast.  2. No other suspicious findings.  BI-RADS CATEGORY 0: ASSESSMENT IS INCOMPLETE. NEEDS ADDITIONAL IMAGING EVALUATION.    Diagnostic [facility] MMG  FINDINGS:  Diagnostic mammography of the right breast including magnification views of calcifications in question in the posterior right breast, superior to the nipple line was performed.  The cluster of microcalcifications is increased compared to prior studies and on magnification views, and demonstrates punctate and heterogeneous morphologies.     Impression:     Stereotactic biopsy recommended for increasing  microcalcifications within the posterior superior right breast.     BI-RADS CATEGORY 4: SUSPICIOUS ABNORMALITY-BIOPSY SHOULD BE CONSIDERED.  Addenda     Patient was evaluated on site for excess ability of the calcifications for potential stereotactic biopsy.  Due to multiple factors including anatomy of the patient's breast, positioning of the calcifications, and overall patient positioning on the available stereotactic unit, calcifications are not currently accessible for stereotactic biopsy.     Given this, these calcifications remains suspicious.  Options for additional evaluation would include surgical consultation with attempted wire localization and excisional biopsy, six-month follow-up diagnostic mammogram, or bilateral breast MRI.  Given increase of microcalcifications since 2021, based on imaging findings surgical consultation and attempted excision or biopsy is recommended.     Decision for further evaluation is ultimately left to discretion of the referring clinician in consultation with patient and patient's family, as well as factoring in the overall clinical condition of the patient.     I discussed this with Dr. Goel at time of addendum.      ASSESSMENT & PLAN:  82 y.o. female with BI-RADS 4 lesion of the right breast  - not a candidate for stereotactic biopsy given the location of the lesion and the patient's breast anatomy  - I discussed with the patient performing a surgical excisional biopsy as well as the associated risk of pain, bleeding, scarring, infection, seroma, hematoma, need for further intervention  - however we will need either a guidewire or reflector placed to help guide the surgery and assure were removed the area of concern  - I will reach out to Radiology to see if they think this would be technically feasible, if so I will contact the patient or her son to schedule surgery

## 2022-08-15 ENCOUNTER — TELEPHONE (OUTPATIENT)
Dept: FAMILY MEDICINE | Facility: CLINIC | Age: 82
End: 2022-08-15

## 2022-08-15 NOTE — TELEPHONE ENCOUNTER
----- Message from Alfred Arnold sent at 8/12/2022  4:20 PM CDT -----  Contact: pt's son Chao at [phone]  Type: Needs Medical Advice  Who Called:  pt's rob Guidry  Best Call Back Number: [phone]  Additional Information: Chao is calling the office to see if the Biopsy is to be preformed on 8/18/22 or on 8/25/22 as those are their preferred dates. Please call back and advise.

## 2022-08-19 ENCOUNTER — TELEPHONE (OUTPATIENT)
Dept: SURGERY | Facility: CLINIC | Age: 82
End: 2022-08-19
Payer: MEDICARE

## 2022-08-19 NOTE — TELEPHONE ENCOUNTER
Spoke to Marcelo notified ,we will follow up with date for procedure and get clearence form Dr Harsh Benson  And coumadin instructions. Understanding verbalized

## 2022-08-19 NOTE — TELEPHONE ENCOUNTER
----- Message from Kimberlyn Patel sent at 8/19/2022  4:02 PM CDT -----  Contact: [phone]  Type: Needs Medical Advice  Who Called: Pts Son Raffi    Best Call Back Number: [phone]    Additional Information: Raffi is calling to see what the status of surgery date is for pts breast biopsy. Pls call back and advise. Raffi is wondering if you are waiting on clearance info from PCP/ Cardio.

## 2022-09-19 DIAGNOSIS — G25.2 COARSE TREMORS: Primary | ICD-10-CM

## 2022-09-19 DIAGNOSIS — I69.923 STUTTERING AS LATE EFFECT OF CEREBROVASCULAR DISEASE: ICD-10-CM

## 2022-09-19 NOTE — PROGRESS NOTES
Patient's nephrologist Dr. Maynard sent me a message concerning patient having stuttering in speech and also tremors.  Spoke to patient's son and this has been going on for quite some time.  He believes that this might be due to some changes in medications or not take medications like diltiazem timely fashion.  History of stroke also has been noted and I believe this might be long-term consequences.      No fevers or head injury or trauma.  No significant alcohol use.      Will give referral to Neurology also.    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 Wagner  office  also.

## 2022-10-24 DIAGNOSIS — R25.1 TREMOR: ICD-10-CM

## 2022-10-24 DIAGNOSIS — G45.9 TRANSIENT CEREBRAL ISCHEMIA, UNSPECIFIED TYPE: Primary | ICD-10-CM

## 2022-11-01 DIAGNOSIS — R29.6 FREQUENT FALLS: Primary | ICD-10-CM

## 2022-11-01 DIAGNOSIS — F80.81 STUTTERING: Primary | ICD-10-CM

## 2022-11-09 ENCOUNTER — LAB VISIT (OUTPATIENT)
Dept: LAB | Facility: HOSPITAL | Age: 82
End: 2022-11-09
Attending: NURSE PRACTITIONER
Payer: MEDICARE

## 2022-11-09 DIAGNOSIS — R25.1 TREMOR: Primary | ICD-10-CM

## 2022-11-09 LAB
ALBUMIN SERPL BCP-MCNC: 4.4 G/DL (ref 3.5–5.2)
ALP SERPL-CCNC: 81 U/L (ref 55–135)
ALT SERPL W/O P-5'-P-CCNC: 29 U/L (ref 10–44)
ANION GAP SERPL CALC-SCNC: 11 MMOL/L (ref 8–16)
AST SERPL-CCNC: 26 U/L (ref 10–40)
BILIRUB SERPL-MCNC: 0.9 MG/DL (ref 0.1–1)
BUN SERPL-MCNC: 17 MG/DL (ref 8–23)
CALCIUM SERPL-MCNC: 8.7 MG/DL (ref 8.7–10.5)
CHLORIDE SERPL-SCNC: 94 MMOL/L (ref 95–110)
CO2 SERPL-SCNC: 32 MMOL/L (ref 23–29)
CREAT SERPL-MCNC: 4.4 MG/DL (ref 0.5–1.4)
EST. GFR  (NO RACE VARIABLE): 9.5 ML/MIN/1.73 M^2
GLUCOSE SERPL-MCNC: 110 MG/DL (ref 70–110)
POTASSIUM SERPL-SCNC: 3.3 MMOL/L (ref 3.5–5.1)
PROT SERPL-MCNC: 8.2 G/DL (ref 6–8.4)
SODIUM SERPL-SCNC: 137 MMOL/L (ref 136–145)
TSH SERPL DL<=0.005 MIU/L-ACNC: 1.33 UIU/ML (ref 0.34–5.6)

## 2022-11-09 PROCEDURE — 84443 ASSAY THYROID STIM HORMONE: CPT | Performed by: NURSE PRACTITIONER

## 2022-11-09 PROCEDURE — 36415 COLL VENOUS BLD VENIPUNCTURE: CPT | Performed by: NURSE PRACTITIONER

## 2022-11-09 PROCEDURE — 80053 COMPREHEN METABOLIC PANEL: CPT | Performed by: NURSE PRACTITIONER

## 2022-11-11 ENCOUNTER — HOSPITAL ENCOUNTER (OUTPATIENT)
Dept: RADIOLOGY | Facility: HOSPITAL | Age: 82
Discharge: HOME | End: 2022-11-11
Attending: NURSE PRACTITIONER
Payer: MEDICARE

## 2022-11-11 DIAGNOSIS — G45.9 TRANSIENT CEREBRAL ISCHEMIA, UNSPECIFIED TYPE: ICD-10-CM

## 2022-11-11 DIAGNOSIS — R25.1 TREMOR: ICD-10-CM

## 2022-11-11 PROCEDURE — 70544 MR ANGIOGRAPHY HEAD W/O DYE: CPT | Mod: TC,PO

## 2022-11-11 PROCEDURE — 70551 MRI BRAIN STEM W/O DYE: CPT | Mod: TC,PO

## 2022-11-11 PROCEDURE — 93880 EXTRACRANIAL BILAT STUDY: CPT | Mod: TC,PO

## 2022-12-07 ENCOUNTER — OFFICE VISIT (OUTPATIENT)
Dept: FAMILY MEDICINE | Facility: CLINIC | Age: 82
End: 2022-12-07
Payer: MEDICARE

## 2022-12-07 VITALS
HEART RATE: 84 BPM | WEIGHT: 117 LBS | SYSTOLIC BLOOD PRESSURE: 131 MMHG | HEIGHT: 64 IN | DIASTOLIC BLOOD PRESSURE: 62 MMHG | BODY MASS INDEX: 19.97 KG/M2

## 2022-12-07 DIAGNOSIS — I12.0 BENIGN HYPERTENSION WITH ESRD (END-STAGE RENAL DISEASE): ICD-10-CM

## 2022-12-07 DIAGNOSIS — E78.2 MULTIPLE-TYPE HYPERLIPIDEMIA: ICD-10-CM

## 2022-12-07 DIAGNOSIS — N18.6 BENIGN HYPERTENSION WITH ESRD (END-STAGE RENAL DISEASE): ICD-10-CM

## 2022-12-07 DIAGNOSIS — N18.6 STAGE 5 CHRONIC KIDNEY DISEASE ON CHRONIC DIALYSIS: ICD-10-CM

## 2022-12-07 DIAGNOSIS — I48.0 PAROXYSMAL ATRIAL FIBRILLATION: Primary | ICD-10-CM

## 2022-12-07 DIAGNOSIS — Z79.01 CHRONIC ANTICOAGULATION: ICD-10-CM

## 2022-12-07 DIAGNOSIS — Z99.2 STAGE 5 CHRONIC KIDNEY DISEASE ON CHRONIC DIALYSIS: ICD-10-CM

## 2022-12-07 DIAGNOSIS — I25.118 CORONARY ARTERY DISEASE OF NATIVE HEART WITH STABLE ANGINA PECTORIS, UNSPECIFIED VESSEL OR LESION TYPE: ICD-10-CM

## 2022-12-07 PROCEDURE — 1159F MED LIST DOCD IN RCRD: CPT | Mod: CPTII,S$GLB,, | Performed by: INTERNAL MEDICINE

## 2022-12-07 PROCEDURE — 3288F FALL RISK ASSESSMENT DOCD: CPT | Mod: CPTII,S$GLB,, | Performed by: INTERNAL MEDICINE

## 2022-12-07 PROCEDURE — 99214 OFFICE O/P EST MOD 30 MIN: CPT | Mod: S$GLB,,, | Performed by: INTERNAL MEDICINE

## 2022-12-07 PROCEDURE — 3075F PR MOST RECENT SYSTOLIC BLOOD PRESS GE 130-139MM HG: ICD-10-PCS | Mod: CPTII,S$GLB,, | Performed by: INTERNAL MEDICINE

## 2022-12-07 PROCEDURE — 1160F RVW MEDS BY RX/DR IN RCRD: CPT | Mod: CPTII,S$GLB,, | Performed by: INTERNAL MEDICINE

## 2022-12-07 PROCEDURE — 1101F PT FALLS ASSESS-DOCD LE1/YR: CPT | Mod: CPTII,S$GLB,, | Performed by: INTERNAL MEDICINE

## 2022-12-07 PROCEDURE — 1101F PR PT FALLS ASSESS DOC 0-1 FALLS W/OUT INJ PAST YR: ICD-10-PCS | Mod: CPTII,S$GLB,, | Performed by: INTERNAL MEDICINE

## 2022-12-07 PROCEDURE — 1159F PR MEDICATION LIST DOCUMENTED IN MEDICAL RECORD: ICD-10-PCS | Mod: CPTII,S$GLB,, | Performed by: INTERNAL MEDICINE

## 2022-12-07 PROCEDURE — 3078F DIAST BP <80 MM HG: CPT | Mod: CPTII,S$GLB,, | Performed by: INTERNAL MEDICINE

## 2022-12-07 PROCEDURE — 1160F PR REVIEW ALL MEDS BY PRESCRIBER/CLIN PHARMACIST DOCUMENTED: ICD-10-PCS | Mod: CPTII,S$GLB,, | Performed by: INTERNAL MEDICINE

## 2022-12-07 PROCEDURE — 99214 PR OFFICE/OUTPT VISIT, EST, LEVL IV, 30-39 MIN: ICD-10-PCS | Mod: S$GLB,,, | Performed by: INTERNAL MEDICINE

## 2022-12-07 PROCEDURE — 1126F PR PAIN SEVERITY QUANTIFIED, NO PAIN PRESENT: ICD-10-PCS | Mod: CPTII,S$GLB,, | Performed by: INTERNAL MEDICINE

## 2022-12-07 PROCEDURE — 3288F PR FALLS RISK ASSESSMENT DOCUMENTED: ICD-10-PCS | Mod: CPTII,S$GLB,, | Performed by: INTERNAL MEDICINE

## 2022-12-07 PROCEDURE — 1126F AMNT PAIN NOTED NONE PRSNT: CPT | Mod: CPTII,S$GLB,, | Performed by: INTERNAL MEDICINE

## 2022-12-07 PROCEDURE — 3075F SYST BP GE 130 - 139MM HG: CPT | Mod: CPTII,S$GLB,, | Performed by: INTERNAL MEDICINE

## 2022-12-07 PROCEDURE — 3078F PR MOST RECENT DIASTOLIC BLOOD PRESSURE < 80 MM HG: ICD-10-PCS | Mod: CPTII,S$GLB,, | Performed by: INTERNAL MEDICINE

## 2022-12-07 RX ORDER — WARFARIN 1 MG/1
1.5 TABLET ORAL
COMMUNITY
End: 2023-03-06
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
## 2022-12-09 ENCOUNTER — HOSPITAL ENCOUNTER (OUTPATIENT)
Facility: HOSPITAL | Age: 82
Discharge: HOME | End: 2022-12-10
Attending: EMERGENCY MEDICINE | Admitting: INTERNAL MEDICINE
Payer: MEDICARE

## 2022-12-09 DIAGNOSIS — R79.89 ELEVATED TROPONIN: ICD-10-CM

## 2022-12-09 DIAGNOSIS — R07.9 CHEST PAIN: ICD-10-CM

## 2022-12-09 DIAGNOSIS — I48.91 FIBRILLATION, ATRIAL: ICD-10-CM

## 2022-12-09 DIAGNOSIS — I48.91 ATRIAL FIBRILLATION WITH RVR: Primary | ICD-10-CM

## 2022-12-09 PROBLEM — I51.89 DIASTOLIC DYSFUNCTION: Chronic | Status: ACTIVE | Noted: 2022-12-09

## 2022-12-09 PROBLEM — Z79.01 ANTICOAGULATED ON COUMADIN: Chronic | Status: ACTIVE | Noted: 2022-12-09

## 2022-12-09 PROBLEM — E87.6 HYPOKALEMIA: Status: ACTIVE | Noted: 2022-12-09

## 2022-12-09 PROBLEM — I25.10 CAD (CORONARY ARTERY DISEASE): Chronic | Status: ACTIVE | Noted: 2022-12-09

## 2022-12-09 LAB
ALBUMIN SERPL BCP-MCNC: 3 G/DL (ref 3.5–5.2)
ALP SERPL-CCNC: 62 U/L (ref 55–135)
ALT SERPL W/O P-5'-P-CCNC: 21 U/L (ref 10–44)
ANION GAP SERPL CALC-SCNC: 11 MMOL/L (ref 8–16)
AST SERPL-CCNC: 21 U/L (ref 10–40)
BASOPHILS # BLD AUTO: 0.03 K/UL (ref 0–0.2)
BASOPHILS NFR BLD: 0.3 % (ref 0–1.9)
BILIRUB SERPL-MCNC: 1 MG/DL (ref 0.1–1)
BNP SERPL-MCNC: 376 PG/ML (ref 0–99)
BUN SERPL-MCNC: 15 MG/DL (ref 8–23)
CALCIUM SERPL-MCNC: 8.2 MG/DL (ref 8.7–10.5)
CHLORIDE SERPL-SCNC: 98 MMOL/L (ref 95–110)
CO2 SERPL-SCNC: 29 MMOL/L (ref 23–29)
CREAT SERPL-MCNC: 4 MG/DL (ref 0.5–1.4)
DIFFERENTIAL METHOD: ABNORMAL
EOSINOPHIL # BLD AUTO: 0.3 K/UL (ref 0–0.5)
EOSINOPHIL NFR BLD: 2.9 % (ref 0–8)
ERYTHROCYTE [DISTWIDTH] IN BLOOD BY AUTOMATED COUNT: 13.1 % (ref 11.5–14.5)
EST. GFR  (NO RACE VARIABLE): 10.7 ML/MIN/1.73 M^2
GLUCOSE SERPL-MCNC: 148 MG/DL (ref 70–110)
GLUCOSE SERPL-MCNC: 90 MG/DL (ref 70–110)
HCT VFR BLD AUTO: 33.3 % (ref 37–48.5)
HGB BLD-MCNC: 10.4 G/DL (ref 12–16)
IMM GRANULOCYTES # BLD AUTO: 0.04 K/UL (ref 0–0.04)
IMM GRANULOCYTES NFR BLD AUTO: 0.4 % (ref 0–0.5)
INR PPP: 1.6
LYMPHOCYTES # BLD AUTO: 1.6 K/UL (ref 1–4.8)
LYMPHOCYTES NFR BLD: 16.9 % (ref 18–48)
MAGNESIUM SERPL-MCNC: 1.7 MG/DL (ref 1.6–2.6)
MCH RBC QN AUTO: 31 PG (ref 27–31)
MCHC RBC AUTO-ENTMCNC: 31.2 G/DL (ref 32–36)
MCV RBC AUTO: 99 FL (ref 82–98)
MONOCYTES # BLD AUTO: 1.5 K/UL (ref 0.3–1)
MONOCYTES NFR BLD: 15.3 % (ref 4–15)
NEUTROPHILS # BLD AUTO: 6.2 K/UL (ref 1.8–7.7)
NEUTROPHILS NFR BLD: 64.2 % (ref 38–73)
NRBC BLD-RTO: 0 /100 WBC
PLATELET # BLD AUTO: 297 K/UL (ref 150–450)
PMV BLD AUTO: 9.3 FL (ref 9.2–12.9)
POTASSIUM SERPL-SCNC: 3.2 MMOL/L (ref 3.5–5.1)
PROT SERPL-MCNC: 7.2 G/DL (ref 6–8.4)
PROTHROMBIN TIME: 17.8 SEC (ref 11.4–13.7)
RBC # BLD AUTO: 3.36 M/UL (ref 4–5.4)
SODIUM SERPL-SCNC: 138 MMOL/L (ref 136–145)
TROPONIN I SERPL HS-MCNC: 160.9 PG/ML (ref 0–14.9)
TROPONIN I SERPL HS-MCNC: 27.5 PG/ML (ref 0–14.9)
TROPONIN I SERPL HS-MCNC: 623.7 PG/ML (ref 0–14.9)
TSH SERPL DL<=0.005 MIU/L-ACNC: 0.77 UIU/ML (ref 0.34–5.6)
WBC # BLD AUTO: 9.7 K/UL (ref 3.9–12.7)

## 2022-12-09 PROCEDURE — 96365 THER/PROPH/DIAG IV INF INIT: CPT

## 2022-12-09 PROCEDURE — 83880 ASSAY OF NATRIURETIC PEPTIDE: CPT | Performed by: EMERGENCY MEDICINE

## 2022-12-09 PROCEDURE — G0378 HOSPITAL OBSERVATION PER HR: HCPCS

## 2022-12-09 PROCEDURE — 36415 COLL VENOUS BLD VENIPUNCTURE: CPT | Performed by: EMERGENCY MEDICINE

## 2022-12-09 PROCEDURE — 63600175 PHARM REV CODE 636 W HCPCS: Performed by: EMERGENCY MEDICINE

## 2022-12-09 PROCEDURE — 36415 COLL VENOUS BLD VENIPUNCTURE: CPT | Performed by: INTERNAL MEDICINE

## 2022-12-09 PROCEDURE — 85025 COMPLETE CBC W/AUTO DIFF WBC: CPT | Performed by: EMERGENCY MEDICINE

## 2022-12-09 PROCEDURE — 83735 ASSAY OF MAGNESIUM: CPT | Performed by: EMERGENCY MEDICINE

## 2022-12-09 PROCEDURE — 93010 EKG 12-LEAD: ICD-10-PCS | Mod: ,,, | Performed by: INTERNAL MEDICINE

## 2022-12-09 PROCEDURE — 84484 ASSAY OF TROPONIN QUANT: CPT | Mod: 91 | Performed by: INTERNAL MEDICINE

## 2022-12-09 PROCEDURE — 96375 TX/PRO/DX INJ NEW DRUG ADDON: CPT

## 2022-12-09 PROCEDURE — 63600175 PHARM REV CODE 636 W HCPCS: Performed by: INTERNAL MEDICINE

## 2022-12-09 PROCEDURE — 25000003 PHARM REV CODE 250: Performed by: EMERGENCY MEDICINE

## 2022-12-09 PROCEDURE — 25000003 PHARM REV CODE 250: Performed by: INTERNAL MEDICINE

## 2022-12-09 PROCEDURE — 82962 GLUCOSE BLOOD TEST: CPT

## 2022-12-09 PROCEDURE — 84443 ASSAY THYROID STIM HORMONE: CPT | Performed by: EMERGENCY MEDICINE

## 2022-12-09 PROCEDURE — 84484 ASSAY OF TROPONIN QUANT: CPT | Mod: 91 | Performed by: EMERGENCY MEDICINE

## 2022-12-09 PROCEDURE — 99285 EMERGENCY DEPT VISIT HI MDM: CPT | Mod: 25

## 2022-12-09 PROCEDURE — 80053 COMPREHEN METABOLIC PANEL: CPT | Performed by: EMERGENCY MEDICINE

## 2022-12-09 PROCEDURE — 93010 ELECTROCARDIOGRAM REPORT: CPT | Mod: 76,,, | Performed by: INTERNAL MEDICINE

## 2022-12-09 PROCEDURE — 93005 ELECTROCARDIOGRAM TRACING: CPT | Performed by: INTERNAL MEDICINE

## 2022-12-09 PROCEDURE — 85610 PROTHROMBIN TIME: CPT | Performed by: INTERNAL MEDICINE

## 2022-12-09 RX ORDER — WARFARIN 1 MG/1
3 TABLET ORAL ONCE
Status: COMPLETED | OUTPATIENT
Start: 2022-12-09 | End: 2022-12-09

## 2022-12-09 RX ORDER — SODIUM CHLORIDE 0.9 % (FLUSH) 0.9 %
10 SYRINGE (ML) INJECTION EVERY 6 HOURS PRN
Status: DISCONTINUED | OUTPATIENT
Start: 2022-12-09 | End: 2022-12-10 | Stop reason: HOSPADM

## 2022-12-09 RX ORDER — DORZOLAMIDE HYDROCHLORIDE AND TIMOLOL MALEATE 20; 5 MG/ML; MG/ML
1 SOLUTION/ DROPS OPHTHALMIC 2 TIMES DAILY
Status: DISCONTINUED | OUTPATIENT
Start: 2022-12-09 | End: 2022-12-10 | Stop reason: HOSPADM

## 2022-12-09 RX ORDER — CALCIUM ACETATE 667 MG/1
667 CAPSULE ORAL
Status: DISCONTINUED | OUTPATIENT
Start: 2022-12-09 | End: 2022-12-10 | Stop reason: HOSPADM

## 2022-12-09 RX ORDER — NALOXONE HCL 0.4 MG/ML
0.02 VIAL (ML) INJECTION
Status: DISCONTINUED | OUTPATIENT
Start: 2022-12-09 | End: 2022-12-10 | Stop reason: HOSPADM

## 2022-12-09 RX ORDER — PREDNISOLONE ACETATE 10 MG/ML
SUSPENSION/ DROPS OPHTHALMIC
COMMUNITY
Start: 2022-09-02 | End: 2023-03-06

## 2022-12-09 RX ORDER — ATORVASTATIN CALCIUM 40 MG/1
40 TABLET, FILM COATED ORAL NIGHTLY
Status: DISCONTINUED | OUTPATIENT
Start: 2022-12-09 | End: 2022-12-10 | Stop reason: HOSPADM

## 2022-12-09 RX ORDER — ACETAMINOPHEN 325 MG/1
650 TABLET ORAL EVERY 4 HOURS PRN
Status: DISCONTINUED | OUTPATIENT
Start: 2022-12-09 | End: 2022-12-10 | Stop reason: HOSPADM

## 2022-12-09 RX ORDER — DILTIAZEM HYDROCHLORIDE 120 MG/1
120 CAPSULE, COATED, EXTENDED RELEASE ORAL 2 TIMES DAILY
Status: DISCONTINUED | OUTPATIENT
Start: 2022-12-09 | End: 2022-12-10 | Stop reason: HOSPADM

## 2022-12-09 RX ORDER — LATANOPROST 50 UG/ML
1 SOLUTION/ DROPS OPHTHALMIC NIGHTLY
Status: DISCONTINUED | OUTPATIENT
Start: 2022-12-09 | End: 2022-12-10 | Stop reason: HOSPADM

## 2022-12-09 RX ORDER — POTASSIUM CHLORIDE 20 MEQ/1
20 TABLET, EXTENDED RELEASE ORAL ONCE
Status: COMPLETED | OUTPATIENT
Start: 2022-12-09 | End: 2022-12-09

## 2022-12-09 RX ORDER — IBUPROFEN 200 MG
24 TABLET ORAL
Status: DISCONTINUED | OUTPATIENT
Start: 2022-12-09 | End: 2022-12-10 | Stop reason: HOSPADM

## 2022-12-09 RX ORDER — DILTIAZEM HYDROCHLORIDE 5 MG/ML
10 INJECTION INTRAVENOUS
Status: COMPLETED | OUTPATIENT
Start: 2022-12-09 | End: 2022-12-09

## 2022-12-09 RX ORDER — MAGNESIUM SULFATE 1 G/100ML
1 INJECTION INTRAVENOUS ONCE
Status: COMPLETED | OUTPATIENT
Start: 2022-12-09 | End: 2022-12-09

## 2022-12-09 RX ORDER — GLUCAGON 1 MG
1 KIT INJECTION
Status: DISCONTINUED | OUTPATIENT
Start: 2022-12-09 | End: 2022-12-10 | Stop reason: HOSPADM

## 2022-12-09 RX ORDER — DORZOLAMIDE HCL 20 MG/ML
SOLUTION/ DROPS OPHTHALMIC
COMMUNITY
End: 2023-03-06

## 2022-12-09 RX ORDER — IBUPROFEN 200 MG
16 TABLET ORAL
Status: DISCONTINUED | OUTPATIENT
Start: 2022-12-09 | End: 2022-12-10 | Stop reason: HOSPADM

## 2022-12-09 RX ORDER — LATANOPROST/PF 0.005 %
DROPS OPHTHALMIC (EYE)
COMMUNITY
End: 2023-03-06

## 2022-12-09 RX ORDER — DIGOXIN 0.25 MG/ML
125 INJECTION INTRAMUSCULAR; INTRAVENOUS
Status: COMPLETED | OUTPATIENT
Start: 2022-12-09 | End: 2022-12-09

## 2022-12-09 RX ORDER — ONDANSETRON 2 MG/ML
4 INJECTION INTRAMUSCULAR; INTRAVENOUS EVERY 8 HOURS PRN
Status: DISCONTINUED | OUTPATIENT
Start: 2022-12-09 | End: 2022-12-10 | Stop reason: HOSPADM

## 2022-12-09 RX ADMIN — DILTIAZEM HYDROCHLORIDE 120 MG: 120 CAPSULE, COATED, EXTENDED RELEASE ORAL at 09:12

## 2022-12-09 RX ADMIN — ONDANSETRON 4 MG: 2 INJECTION INTRAMUSCULAR; INTRAVENOUS at 06:12

## 2022-12-09 RX ADMIN — POTASSIUM CHLORIDE 20 MEQ: 1500 TABLET, EXTENDED RELEASE ORAL at 02:12

## 2022-12-09 RX ADMIN — DORZOLAMIDE HYDROCHLORIDE AND TIMOLOL MALEATE 1 DROP: 22.3; 6.8 SOLUTION/ DROPS OPHTHALMIC at 09:12

## 2022-12-09 RX ADMIN — LATANOPROST 1 DROP: 50 SOLUTION OPHTHALMIC at 09:12

## 2022-12-09 RX ADMIN — MAGNESIUM SULFATE 1 G: 1 INJECTION INTRAVENOUS at 04:12

## 2022-12-09 RX ADMIN — CALCIUM ACETATE 667 MG: 667 CAPSULE ORAL at 06:12

## 2022-12-09 RX ADMIN — DIGOXIN 125 MCG: 0.25 INJECTION INTRAMUSCULAR; INTRAVENOUS at 11:12

## 2022-12-09 RX ADMIN — WARFARIN SODIUM 3 MG: 1 TABLET ORAL at 06:12

## 2022-12-09 RX ADMIN — ATORVASTATIN CALCIUM 40 MG: 40 TABLET, FILM COATED ORAL at 09:12

## 2022-12-09 RX ADMIN — DILTIAZEM HYDROCHLORIDE 10 MG: 5 INJECTION INTRAVENOUS at 12:12

## 2022-12-09 NOTE — CONSULTS
Nephrology Consult Note        Patient Name: Tyra Isaac  MRN: [id number]    Patient Class: OP- Observation   Admission Date: 12/9/2022  Length of Stay: 0 days  Date of Service: 12/9/2022    Attending Physician: Melissa Wu MD  Primary Care Provider: Kalpesh Goel MD    Reason for Consult: esrd/anemia/htn/shpt/afib with rvr    SUBJECTIVE:     HPI: 82F with  ESRD omn HD MWF was sent from [facility] where she completed a dialysis session today but developed weakness, dizziness and tachycardia in the 150's. She did not take some of her meds for few days due to some confusion what she should take. She is otherwise very compliant and well controlled patient.    Past Medical History:   Diagnosis Date    A-fib     Anxiety     Depression     Disorder of kidney and ureter     Encephalopathy acute 1/1/2018    End stage kidney disease 6/17/2017    Gout     Hyperlipidemia     Hypertension     Moderate episode of recurrent major depressive disorder 1/17/2018    Nephropathy hypertensive, 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    Osteopenia of multiple sites 3/9/2018    Based upon bone density measurements. Patient also has chronic kidney disease.    Stroke 11/2016     Past Surgical History:   Procedure Laterality Date    ANGIOGRAM, CORONARY, WITH LEFT HEART CATHETERIZATION N/A 2/7/2022    Procedure: Angiogram, Coronary, with Left Heart Cath;  Surgeon: Gino Leal MD;  Location: [facility] CATH/EP LAB;  Service: Cardiology;  Laterality: N/A;    CARDIAC SURGERY      stents    EYE SURGERY      WRIST SURGERY       Family History   Problem Relation Age of Onset    Heart disease Mother     Cancer Father      Social History     Tobacco Use    Smoking status: Never    Smokeless tobacco: Never   Substance Use Topics    Alcohol use: No    Drug use: No       Review of patient's allergies indicates:    Allergen Reactions    Cyclobenzaprine     Fish containing products Hives    Peanut Other (See Comments)    Tramadol Itching       Outpatient meds:  No current facility-administered medications on file prior to encounter.     Current Outpatient Medications on File Prior to Encounter   Medication Sig Dispense Refill    amiodarone (PACERONE) 200 MG Tab Take 1 tablet (200 mg total) by mouth 2 (two) times daily. 60 tablet 0    amLODIPine (NORVASC) 5 MG tablet Take 5 mg by mouth once daily.      atorvastatin (LIPITOR) 40 MG tablet Take 40 mg by mouth once daily.      b complex vitamins tablet Take 1 tablet by mouth once daily.      calcium acetate,phosphat bind, (PHOSLO) 667 mg tablet Take 667 mg by mouth 2 (two) times daily with meals.      diltiaZEM (CARDIZEM CD) 120 MG Cp24 Take 120 mg by mouth 2 (two) times daily.      docusate sodium (COLACE) 100 MG capsule Take 100 mg by mouth 3 (three) times a week.      dorzolamide-timolol 2-0.5% (COSOPT) 22.3-6.8 mg/mL ophthalmic solution Place 1 drop into both eyes 2 (two) times daily.       famotidine (PEPCID) 20 MG tablet Take 1 tablet (20 mg total) by mouth nightly as needed for Heartburn. 30 tablet 5    latanoprost 0.005 % ophthalmic solution Place 1 drop into both eyes every evening.       lidocaine 5 % Crea Apply 1 application topically every Mon, Wed, Fri.       prednisoLONE acetate (PRED FORTE) 1 % DrpS   Instructions: 10 mL, INSTILL 1 DROP INTO RIGHT EYE 4 TIMES DAILY FOR 7 DAYS THEN STOP, 0 Refill(s), Type: Soft Stop      warfarin (COUMADIN) 1 MG tablet Take 1.5 tablet by mouth on Monday, Wednesday, Friday, Saturday, and Sunday      warfarin (COUMADIN) 3 MG tablet Take 3 mg by mouth twice a week. On Tuesday and thursdays      dorzolamide (TRUSOPT) 2 % ophthalmic solution 1 drop into affected eye      latanoprost, PF, 0.005 % Drop 1 drop into affected eye in the evening      vit B complex 100 no.2/herbs (VITAMIN B COMPLEX 100  2-HERBS ORAL)       warfarin (COUMADIN) 2  MG tablet Take 2 mg by mouth every Mon, Wed, Fri.         Scheduled meds:      Infusions:      PRN meds:      Review of Systems:  Review of Systems   Constitutional:  Positive for malaise/fatigue. Negative for chills, fever and weight loss.   HENT:  Negative for hearing loss and nosebleeds.    Eyes:  Negative for blurred vision, double vision and photophobia.   Respiratory:  Negative for cough, shortness of breath and wheezing.    Cardiovascular:  Positive for palpitations. Negative for chest pain and leg swelling.   Gastrointestinal:  Negative for abdominal pain, constipation, diarrhea, heartburn, nausea and vomiting.   Genitourinary:  Negative for dysuria, frequency and urgency.   Musculoskeletal:  Negative for falls, joint pain and myalgias.   Skin:  Negative for itching and rash.   Neurological:  Positive for focal weakness. Negative for dizziness, speech change, loss of consciousness and headaches.   Endo/Heme/Allergies:  Does not bruise/bleed easily.   Psychiatric/Behavioral:  Negative for depression and substance abuse. The patient is not nervous/anxious.      OBJECTIVE:     Vital Signs and IO:  Temp:  [97.8 °F (36.6 °C)]   Pulse:  []   Resp:  [21-36]   BP: ()/(52-93)   SpO2:  [96 %-98 %]   No intake/output data recorded.  Wt Readings from Last 5 Encounters:   12/07/22 53.1 kg (117 lb)   06/15/22 52.6 kg (116 lb)   02/18/22 54.9 kg (121 lb)   02/14/22 54.4 kg (120 lb)   02/02/22 56 kg (123 lb 7.3 oz)     There is no height or weight on file to calculate BMI.    Physical Exam  Constitutional:       General: She is not in acute distress.     Appearance: She is well-developed. She is not diaphoretic.   HENT:      Head: Normocephalic and atraumatic.      Mouth/Throat:      Mouth: Mucous membranes are moist.   Eyes:      General: No scleral icterus.     Pupils: Pupils are equal, round, and reactive to light.   Cardiovascular:      Rate and Rhythm: Normal rate and regular rhythm.   Pulmonary:       Effort: Pulmonary effort is normal. No respiratory distress.      Breath sounds: No stridor.   Abdominal:      General: There is no distension.      Palpations: Abdomen is soft.   Musculoskeletal:         General: No deformity. Normal range of motion.      Cervical back: Neck supple.   Skin:     General: Skin is warm and dry.      Findings: No erythema or rash.   Neurological:      Mental Status: She is alert and oriented to person, place, and time.      Cranial Nerves: No cranial nerve deficit.   Psychiatric:         Behavior: Behavior normal.       Laboratory:  Recent Labs   Lab 12/09/22  1145      K 3.2*   CL 98   CO2 29   BUN 15   CREATININE 4.0*   GLU 90       Recent Labs   Lab 12/09/22  1145   CALCIUM 8.2*   ALBUMIN 3.0*   MG 1.7             No results for input(s): POCTGLUCOSE in the last 168 hours.    Recent Labs   Lab 01/26/22  1525   Hemoglobin A1C 5.3       Recent Labs   Lab 12/09/22  1145   WBC 9.70   HGB 10.4*   HCT 33.3*      MCV 99*   MCHC 31.2*   MONO 15.3*  1.5*       Recent Labs   Lab 12/09/22  1145   BILITOT 1.0   PROT 7.2   ALBUMIN 3.0*   ALKPHOS 62   ALT 21   AST 21       Recent Labs   Lab 02/02/22  1605   Color, UA Yellow   Appearance, UA Clear   pH, UA >8.0 A   Specific Gravity, UA 1.005   Protein, UA 1+ A   Glucose, UA Negative   Ketones, UA Negative   Urobilinogen, UA Negative   Bilirubin (UA) Negative   Occult Blood UA Negative   Nitrite, UA Negative   RBC, UA 1   WBC, UA 1   Bacteria Negative   Hyaline Casts, UA 3 A       Recent Labs   Lab 12/13/19  1243   POC PH 7.51 H   POC PCO2 43   POC PO2 90   POC SATURATED O2 97.9       Microbiology Results (last 7 days)       ** No results found for the last 168 hours. **            ASSESSMENT/PLAN:     ESRD on HD MWF via [facility]  Continue current dialysis prescription.  Next HD per schedule.  Renal diet - low K, low phos.  No IVs or BP checks on access and/or non-dominant arm.    Anemia of CKD  Hgb and HCT are acceptable. Monitor for  now.  Will provide BRAYAN and/or IV iron PRN.    MBD / Secondary HPT  Ca, Phos, PTH and vitamin D levels are acceptable.   Phos binders, vitamin D and analogues, calcimimetics will be given as needed.    HTN  BP seem controlled.   Tolerate asymptomatic HTN up to -160.  Continue home meds.  Low sodium diet.    AF with RVR  Plan per primary team and cards.    Thank you for allowing us to participate in the care of your patient!   We will follow the patient and provide recommendations as needed.    Patient care time was spent personally by me on the following activities:     Obtaining a history.  Examination of patient.  Providing medical care at the patients bedside.  Developing a treatment plan with patient or surrogate and bedside caregivers.  Ordering and reviewing laboratory studies, radiographic studies, pulse oximetry.  Ordering and performing treatments and interventions.  Evaluation of patient's response to treatment.  Discussions with consultants while on the unit and immediately available to the patient.  Re-evaluation of the patient's condition.  Documentation in the medical record.     Raleigh Yee MD    [facility]  [address] [place] [address]  [place] [address]    [phone] - tel  [phone] - fax    12/9/2022

## 2022-12-09 NOTE — SUBJECTIVE & OBJECTIVE
Past Medical History:   Diagnosis Date    A-fib     Anxiety     Depression     Disorder of kidney and ureter     Encephalopathy acute 1/1/2018    End stage kidney disease 6/17/2017    Gout     Hyperlipidemia     Hypertension     Moderate episode of recurrent major depressive disorder 1/17/2018    Nephropathy hypertensive, 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    Osteopenia of multiple sites 3/9/2018    Based upon bone density measurements. Patient also has chronic kidney disease.    Stroke 11/2016       Past Surgical History:   Procedure Laterality Date    ANGIOGRAM, CORONARY, WITH LEFT HEART CATHETERIZATION N/A 2/7/2022    Procedure: Angiogram, Coronary, with Left Heart Cath;  Surgeon: Gino Leal MD;  Location: [facility] CATH/EP LAB;  Service: Cardiology;  Laterality: N/A;    CARDIAC SURGERY      stents    EYE SURGERY      WRIST SURGERY         Review of patient's allergies indicates:   Allergen Reactions    Cyclobenzaprine     Fish containing products Hives    Peanut Other (See Comments)    Tramadol Itching       No current facility-administered medications on file prior to encounter.     Current Outpatient Medications on File Prior to Encounter   Medication Sig    amiodarone (PACERONE) 200 MG Tab Take 1 tablet (200 mg total) by mouth 2 (two) times daily.    amLODIPine (NORVASC) 5 MG tablet Take 5 mg by mouth once daily.    atorvastatin (LIPITOR) 40 MG tablet Take 40 mg by mouth once daily.    b complex vitamins tablet Take 1 tablet by mouth once daily.    calcium acetate,phosphat bind, (PHOSLO) 667 mg tablet Take 667 mg by mouth 2 (two) times daily with meals.    diltiaZEM (CARDIZEM CD) 120 MG Cp24 Take 120 mg by mouth 2 (two) times daily.    docusate sodium (COLACE) 100 MG capsule Take 100 mg by mouth 3 (three) times a week.    dorzolamide-timolol 2-0.5% (COSOPT) 22.3-6.8 mg/mL  ophthalmic solution Place 1 drop into both eyes 2 (two) times daily.     famotidine (PEPCID) 20 MG tablet Take 1 tablet (20 mg total) by mouth nightly as needed for Heartburn.    latanoprost 0.005 % ophthalmic solution Place 1 drop into both eyes every evening.     lidocaine 5 % Crea Apply 1 application topically every Mon, Wed, Fri.     prednisoLONE acetate (PRED FORTE) 1 % DrpS   Instructions: 10 mL, INSTILL 1 DROP INTO RIGHT EYE 4 TIMES DAILY FOR 7 DAYS THEN STOP, 0 Refill(s), Type: Soft Stop    warfarin (COUMADIN) 1 MG tablet Take 1.5 tablet by mouth on Monday, Wednesday, Friday, Saturday, and Sunday    warfarin (COUMADIN) 3 MG tablet Take 3 mg by mouth twice a week. On Tuesday and thursdays    dorzolamide (TRUSOPT) 2 % ophthalmic solution 1 drop into affected eye    latanoprost, PF, 0.005 % Drop 1 drop into affected eye in the evening    vit B complex 100 no.2/herbs (VITAMIN B COMPLEX 100  2-HERBS ORAL)     warfarin (COUMADIN) 2 MG tablet Take 2 mg by mouth every Mon, Wed, Fri.     Family History       Problem Relation (Age of Onset)    Cancer Father    Heart disease Mother          Tobacco Use    Smoking status: Never    Smokeless tobacco: Never   Substance and Sexual Activity    Alcohol use: No    Drug use: No    Sexual activity: Not Currently     Review of Systems   Constitutional:  Positive for chills (always cold). Negative for fever.   HENT:  Negative for congestion.    Respiratory:  Positive for shortness of breath.    Cardiovascular:  Positive for chest pain and palpitations. Negative for leg swelling.   Gastrointestinal:  Negative for nausea and vomiting.   Genitourinary:         Produces small amount of urine, wears depends   Skin:  Negative for wound.   Allergic/Immunologic: Negative for immunocompromised state.   Neurological:  Positive for light-headedness.   Psychiatric/Behavioral:  Negative for confusion.    Objective:     Vital Signs (Most Recent):  Temp: 97.8 °F (36.6 °C) (12/09/22  1140)  Pulse: 77 (12/09/22 1400)  Resp: (!) 32 (12/09/22 1400)  BP: 123/60 (12/09/22 1400)  SpO2: 98 % (12/09/22 1400)   Vital Signs (24h Range):  Temp:  [97.8 °F (36.6 °C)] 97.8 °F (36.6 °C)  Pulse:  [] 77  Resp:  [21-36] 32  SpO2:  [96 %-98 %] 98 %  BP: ()/(52-93) 123/60        There is no height or weight on file to calculate BMI.    Physical Exam  Vitals and nursing note reviewed.   Constitutional:       General: She is not in acute distress.     Appearance: She is not ill-appearing.      Comments: Lying in stretcher   HENT:      Head: Normocephalic and atraumatic.      Mouth/Throat:      Mouth: Mucous membranes are moist.      Comments: Wearing surgical mask   Cardiovascular:      Rate and Rhythm: Normal rate and regular rhythm.      Comments: No LE edema  Pulmonary:      Effort: No respiratory distress.      Breath sounds: No stridor. No wheezing.      Comments: On RA, no wheeze, no accessory muscle use  Abdominal:      General: Bowel sounds are normal. There is no distension.      Palpations: Abdomen is soft.      Tenderness: There is no abdominal tenderness.   Genitourinary:     Comments: Wearing depends  Musculoskeletal:      Cervical back: Neck supple.   Skin:     Comments: RUE AVF with dressing overlying   Neurological:      Mental Status: She is alert and oriented to person, place, and time.      Comments: Speech intact, moving all four extremities   Psychiatric:         Mood and Affect: Mood normal.           Significant Labs: BMP:   Recent Labs   Lab 12/09/22  1145   GLU 90      K 3.2*   CL 98   CO2 29   BUN 15   CREATININE 4.0*   CALCIUM 8.2*   MG 1.7     CBC:   Recent Labs   Lab 12/09/22  1145   WBC 9.70   HGB 10.4*   HCT 33.3*        CMP:   Recent Labs   Lab 12/09/22  1145      K 3.2*   CL 98   CO2 29   GLU 90   BUN 15   CREATININE 4.0*   CALCIUM 8.2*   PROT 7.2   ALBUMIN 3.0*   BILITOT 1.0   ALKPHOS 62   AST 21   ALT 21   ANIONGAP 11     Cardiac Markers:   Recent Labs    Lab 12/09/22  1145   *     Magnesium:   Recent Labs   Lab 12/09/22  1145   MG 1.7     Respiratory Culture: No results for input(s): GSRESP, RESPIRATORYC in the last 48 hours.    Significant Imaging: I have reviewed all pertinent imaging results/findings within the past 24 hours.    MRA Brain without contrast    Result Date: 11/11/2022  Examination: Magnetic resonance angiography of the intracranial system. CLINICAL HISTORY: Dizziness. Stuttering. Slurred speech. COMPARISON: Comparison made with patient's previous CT scan of the head without contrast 2/2/2022. TECHNIQUE: Following the acquisition of the dedicated localizer series in the axial plane, composite 3-D MIP images were acquired and generated on a independent workstation. FINDINGS: Standard as a localizer series demonstrates evidence of moderate cerebral atrophy with evidence of a small lacunar infarct 2 mm l situated patient's left basal ganglia. There is appropriate signal intensity emanating from the bilateral anterior cerebral, middle cerebral, posterior cerebral arteries, and respective divisions. Fetal PCA supplies the posterior circulation with evidence of normal flow signal intensity. Marginal irregularity about the proximal ICA bilaterally due to diffuse atheromatous plaquing. No aneurysmal at the branch point of the MCAs bilaterally. A comm patent. The posterior vertebral basilar system demonstrates normal signal intensity. Both inferior and superior cerebellar arteries maintained normal flow signal intensity. IMPRESSION: 1. No MR evidence of large vessel occlusion or stenosis. Mild diffuse atherosclerotic plaquing throughout the M1 segments of the bilateral middle cerebral arteries bilaterally. 2. Fetal PCA on the right supplying the posterior circulation. Electronically signed by:  Rahul Mantilla MD  11/11/2022 3:03 PM [facility] Workstation: [id number]    MRI Brain Without Contrast    Result Date: 11/11/2022  REASON: G45.9 R25.1 Dizziness,  stutter, slurred speech.; No hx CA; No hx Brain Surgery; No hx Trauma; No hx CVA TECHNIQUE: Multiplanar and multi sequential MRI of the brain, without IV contrast. COMPARISON: None FINDINGS: Gray-white matter distinction is preserved throughout the brain parenchyma. Periventricular and deep white matter FLAIR and T2 hyperintensities noted, likely reflecting changes of chronic small vessel ischemic disease. There is mild involutional changes of the brain parenchyma. The ventricles are midline without mass effect. No areas of restricted diffusion or hemorrhage identified. No intra or extra-axial fluid collections or mass. Vascular flow voids are within normal limits. Calvarial bone marrow signal is normal. IMPRESSION: 1.  No acute intracranial abnormality. 2.  Chronic and involutional changes of the brain. Electronically signed by:  Mike Clayton DO  11/11/2022 3:21 PM [facility] Workstation: [id number]    X-Ray Chest AP Portable    Result Date: 12/9/2022  Chest single view CLINICAL DATA: Chest pain FINDINGS: Comparison to February 2. Heart size is upper normal. The thoracic aorta is elongated. Mild interstitial prominence is once again noted. There is development of faint atelectasis or infiltrate at the right lung base. There are no pleural effusions. IMPRESSION: 1. Mild atelectasis or infiltrate at the right lung base, new compared to February 2022. 2. Mild diffuse abnormal interstitial prominence, stable. Electronically signed by:  Martin Woody MD  12/9/2022 1:19 PM [facility] Workstation: [id number]    US Carotid Bilateral    Result Date: 11/11/2022  Reason: G 45.9 Grayscale, color and spectral Doppler analysis was performed. Criteria for stenosis is based upon the Society of Radiologists in Ultrasound Consensus Conference, 2003 (Radiology, November 2003, 229, 340-346). Comparison: None Findings: Grayscale images show there are scattered plaques within the carotid bulbs and proximal internal carotid arteries  bilaterally. Estimated peak systolic velocity in right internal carotid artery is 81 cm/s. Antegrade flow is present in the right vertebral artery. Estimated peak systolic velocity in left internal carotid artery is 83 cm/s. Antegrade flow is present in the left vertebral artery. Impression: No hemodynamically significant internal carotid artery stenosis. Scattered plaques bilaterally Antegrade flow within the vertebral arteries Electronically signed by:  Ana Condon MD  11/11/2022 3:43 PM [facility] Workstation: [id number]

## 2022-12-09 NOTE — HPI
Ms. Isaac is an 82-year-old female who was brought to the ED via EMS with rapid heart rate.  Patient with known history of atrial fibrillation, as per home medication on diltiazem and Coumadin, she admits she did not take her medications since Tuesday, states her son usually leaves out, reports she forgot.  Known history of ESRD on HD via right upper extremity AVF, MWF, was in her usual state of health, completed today session of hemodialysis, states she was about to stand up, became dizzy, mild chest discomfort associated with rapid palpitation and shortness of breath.  Denies any nausea, vomiting, fever, associated syncope.  Denies any history of bleeding.  As per ED report she was found to be in atrial fibrillation with heart rate 160 at dialysis and transferred to the ED. She initially received 10 mg IV diltiazem which dropped her blood pressure but she soon recovered.  Subsequently given 125 mcg of digoxin and heart rate improved.  Labs with hemoglobin 10.4, potassium 3.2, , high sensitivity troponin 27.5, EKG on presentation atrial fibrillation at 148 beats per minute, chest x-ray with mild diffuse chronic changes.  Case discussed with ED provider who requested admission.  Plan of care discussed with patient.

## 2022-12-09 NOTE — H&P
[facility] - Emergency Dept  Hospital Medicine  History & Physical    Patient Name: Tyra Isaac  MRN: [id number]  Patient Class: OP- Observation  Admission Date: 12/9/2022  Attending Physician: Melissa Wu MD   Primary Care Provider: Kalpesh Goel MD         Patient information was obtained from patient, past medical records and ER records.     Subjective:     Principal Problem:Atrial fibrillation with RVR    Chief Complaint:   Chief Complaint   Patient presents with    weakness    afib RVR        HPI: Ms. Isaac is an 82-year-old female who was brought to the ED via EMS with rapid heart rate.  Patient with known history of atrial fibrillation, as per home medication on diltiazem and Coumadin, she admits she did not take her medications since Tuesday, states her son usually leaves out, reports she forgot.  Known history of ESRD on HD via right upper extremity AVF, MWF, was in her usual state of health, completed today session of hemodialysis, states she was about to stand up, became dizzy, mild chest discomfort associated with rapid palpitation and shortness of breath.  Denies any nausea, vomiting, fever, associated syncope.  Denies any history of bleeding.  As per ED report she was found to be in atrial fibrillation with heart rate 160 at dialysis and transferred to the ED. She initially received 10 mg IV diltiazem which dropped her blood pressure but she soon recovered.  Subsequently given 125 mcg of digoxin and heart rate improved.  Labs with hemoglobin 10.4, potassium 3.2, , high sensitivity troponin 27.5, EKG on presentation atrial fibrillation at 148 beats per minute, chest x-ray with mild diffuse chronic changes.  Case discussed with ED provider who requested admission.  Plan of care discussed with patient.      Past Medical History:   Diagnosis Date    A-fib     Anxiety     Depression     Disorder of kidney and ureter     Encephalopathy acute 1/1/2018    End stage  kidney disease 6/17/2017    Gout     Hyperlipidemia     Hypertension     Moderate episode of recurrent major depressive disorder 1/17/2018    Nephropathy hypertensive, stage 5 chronic kidney disease or end stage renal disease 6/17/2017    Obstructive pattern present on pulmonary function testing 7/28/2021    Shows moderate obstruction.    Osteopenia of multiple sites 3/9/2018    Based upon bone density measurements. Patient also has chronic kidney disease.    Stroke 11/2016       Past Surgical History:   Procedure Laterality Date    ANGIOGRAM, CORONARY, WITH LEFT HEART CATHETERIZATION N/A 2/7/2022    Procedure: Angiogram, Coronary, with Left Heart Cath;  Surgeon: Gino Leal MD;  Location: [facility] CATH/EP LAB;  Service: Cardiology;  Laterality: N/A;    CARDIAC SURGERY      stents    EYE SURGERY      WRIST SURGERY         Review of patient's allergies indicates:   Allergen Reactions    Cyclobenzaprine     Fish containing products Hives    Peanut Other (See Comments)    Tramadol Itching       No current facility-administered medications on file prior to encounter.     Current Outpatient Medications on File Prior to Encounter   Medication Sig    amiodarone (PACERONE) 200 MG Tab Take 1 tablet (200 mg total) by mouth 2 (two) times daily.    amLODIPine (NORVASC) 5 MG tablet Take 5 mg by mouth once daily.    atorvastatin (LIPITOR) 40 MG tablet Take 40 mg by mouth once daily.    b complex vitamins tablet Take 1 tablet by mouth once daily.    calcium acetate,phosphat bind, (PHOSLO) 667 mg tablet Take 667 mg by mouth 2 (two) times daily with meals.    diltiaZEM (CARDIZEM CD) 120 MG Cp24 Take 120 mg by mouth 2 (two) times daily.    docusate sodium (COLACE) 100 MG capsule Take 100 mg by mouth 3 (three) times a week.    dorzolamide-timolol 2-0.5% (COSOPT) 22.3-6.8 mg/mL ophthalmic solution Place 1 drop into both eyes 2 (two) times daily.     famotidine (PEPCID) 20 MG tablet Take 1 tablet (20 mg total)  by mouth nightly as needed for Heartburn.    latanoprost 0.005 % ophthalmic solution Place 1 drop into both eyes every evening.     lidocaine 5 % Crea Apply 1 application topically every Mon, Wed, Fri.     prednisoLONE acetate (PRED FORTE) 1 % DrpS   Instructions: 10 mL, INSTILL 1 DROP INTO RIGHT EYE 4 TIMES DAILY FOR 7 DAYS THEN STOP, 0 Refill(s), Type: Soft Stop    warfarin (COUMADIN) 1 MG tablet Take 1.5 tablet by mouth on Monday, Wednesday, Friday, Saturday, and Sunday    warfarin (COUMADIN) 3 MG tablet Take 3 mg by mouth twice a week. On Tuesday and thursdays    dorzolamide (TRUSOPT) 2 % ophthalmic solution 1 drop into affected eye    latanoprost, PF, 0.005 % Drop 1 drop into affected eye in the evening    vit B complex 100 no.2/herbs (VITAMIN B COMPLEX 100  2-HERBS ORAL)     warfarin (COUMADIN) 2 MG tablet Take 2 mg by mouth every Mon, Wed, Fri.     Family History       Problem Relation (Age of Onset)    Cancer Father    Heart disease Mother          Tobacco Use    Smoking status: Never    Smokeless tobacco: Never   Substance and Sexual Activity    Alcohol use: No    Drug use: No    Sexual activity: Not Currently     Review of Systems   Constitutional:  Positive for chills (always cold). Negative for fever.   HENT:  Negative for congestion.    Respiratory:  Positive for shortness of breath.    Cardiovascular:  Positive for chest pain and palpitations. Negative for leg swelling.   Gastrointestinal:  Negative for nausea and vomiting.   Genitourinary:         Produces small amount of urine, wears depends   Skin:  Negative for wound.   Allergic/Immunologic: Negative for immunocompromised state.   Neurological:  Positive for light-headedness.   Psychiatric/Behavioral:  Negative for confusion.    Objective:     Vital Signs (Most Recent):  Temp: 97.8 °F (36.6 °C) (12/09/22 1140)  Pulse: 77 (12/09/22 1400)  Resp: (!) 32 (12/09/22 1400)  BP: 123/60 (12/09/22 1400)  SpO2: 98 % (12/09/22 1400)   Vital Signs  (24h Range):  Temp:  [97.8 °F (36.6 °C)] 97.8 °F (36.6 °C)  Pulse:  [] 77  Resp:  [21-36] 32  SpO2:  [96 %-98 %] 98 %  BP: ()/(52-93) 123/60        There is no height or weight on file to calculate BMI.    Physical Exam  Vitals and nursing note reviewed.   Constitutional:       General: She is not in acute distress.     Appearance: She is not ill-appearing.      Comments: Lying in stretcher   HENT:      Head: Normocephalic and atraumatic.      Mouth/Throat:      Mouth: Mucous membranes are moist.      Comments: Wearing surgical mask   Cardiovascular:      Rate and Rhythm: Normal rate and regular rhythm.      Comments: No LE edema  Pulmonary:      Effort: No respiratory distress.      Breath sounds: No stridor. No wheezing.      Comments: On RA, no wheeze, no accessory muscle use  Abdominal:      General: Bowel sounds are normal. There is no distension.      Palpations: Abdomen is soft.      Tenderness: There is no abdominal tenderness.   Genitourinary:     Comments: Wearing depends  Musculoskeletal:      Cervical back: Neck supple.   Skin:     Comments: RUE AVF with dressing overlying   Neurological:      Mental Status: She is alert and oriented to person, place, and time.      Comments: Speech intact, moving all four extremities   Psychiatric:         Mood and Affect: Mood normal.           Significant Labs: BMP:   Recent Labs   Lab 12/09/22  1145   GLU 90      K 3.2*   CL 98   CO2 29   BUN 15   CREATININE 4.0*   CALCIUM 8.2*   MG 1.7     CBC:   Recent Labs   Lab 12/09/22  1145   WBC 9.70   HGB 10.4*   HCT 33.3*        CMP:   Recent Labs   Lab 12/09/22  1145      K 3.2*   CL 98   CO2 29   GLU 90   BUN 15   CREATININE 4.0*   CALCIUM 8.2*   PROT 7.2   ALBUMIN 3.0*   BILITOT 1.0   ALKPHOS 62   AST 21   ALT 21   ANIONGAP 11     Cardiac Markers:   Recent Labs   Lab 12/09/22  1145   *     Magnesium:   Recent Labs   Lab 12/09/22  1145   MG 1.7     Respiratory Culture: No results for  input(s): GSRESP, RESPIRATORYC in the last 48 hours.    Significant Imaging: I have reviewed all pertinent imaging results/findings within the past 24 hours.    MRA Brain without contrast    Result Date: 11/11/2022  Examination: Magnetic resonance angiography of the intracranial system. CLINICAL HISTORY: Dizziness. Stuttering. Slurred speech. COMPARISON: Comparison made with patient's previous CT scan of the head without contrast 2/2/2022. TECHNIQUE: Following the acquisition of the dedicated localizer series in the axial plane, composite 3-D MIP images were acquired and generated on a independent workstation. FINDINGS: Standard as a localizer series demonstrates evidence of moderate cerebral atrophy with evidence of a small lacunar infarct 2 mm l situated patient's left basal ganglia. There is appropriate signal intensity emanating from the bilateral anterior cerebral, middle cerebral, posterior cerebral arteries, and respective divisions. Fetal PCA supplies the posterior circulation with evidence of normal flow signal intensity. Marginal irregularity about the proximal ICA bilaterally due to diffuse atheromatous plaquing. No aneurysmal at the branch point of the MCAs bilaterally. A comm patent. The posterior vertebral basilar system demonstrates normal signal intensity. Both inferior and superior cerebellar arteries maintained normal flow signal intensity. IMPRESSION: 1. No MR evidence of large vessel occlusion or stenosis. Mild diffuse atherosclerotic plaquing throughout the M1 segments of the bilateral middle cerebral arteries bilaterally. 2. Fetal PCA on the right supplying the posterior circulation. Electronically signed by:  Rahul Mantilla MD  11/11/2022 3:03 PM [facility]UST Workstation: [id number]    MRI Brain Without Contrast    Result Date: 11/11/2022  REASON: G45.9 R25.1 Dizziness, stutter, slurred speech.; No hx CA; No hx Brain Surgery; No hx Trauma; No hx CVA TECHNIQUE: Multiplanar and multi sequential MRI of  the brain, without IV contrast. COMPARISON: None FINDINGS: Gray-white matter distinction is preserved throughout the brain parenchyma. Periventricular and deep white matter FLAIR and T2 hyperintensities noted, likely reflecting changes of chronic small vessel ischemic disease. There is mild involutional changes of the brain parenchyma. The ventricles are midline without mass effect. No areas of restricted diffusion or hemorrhage identified. No intra or extra-axial fluid collections or mass. Vascular flow voids are within normal limits. Calvarial bone marrow signal is normal. IMPRESSION: 1.  No acute intracranial abnormality. 2.  Chronic and involutional changes of the brain. Electronically signed by:  Mike Clayton DO  11/11/2022 3:21 PM CST Workstation: [id number]    X-Ray Chest AP Portable    Result Date: 12/9/2022  Chest single view CLINICAL DATA: Chest pain FINDINGS: Comparison to February 2. Heart size is upper normal. The thoracic aorta is elongated. Mild interstitial prominence is once again noted. There is development of faint atelectasis or infiltrate at the right lung base. There are no pleural effusions. IMPRESSION: 1. Mild atelectasis or infiltrate at the right lung base, new compared to February 2022. 2. Mild diffuse abnormal interstitial prominence, stable. Electronically signed by:  Martin Woody MD  12/9/2022 1:19 PM CST Workstation: [id number]    US Carotid Bilateral    Result Date: 11/11/2022  Reason: G 45.9 Grayscale, color and spectral Doppler analysis was performed. Criteria for stenosis is based upon the Society of Radiologists in Ultrasound Consensus Conference, 2003 (Radiology, November 2003, 229, 340-346). Comparison: None Findings: Grayscale images show there are scattered plaques within the carotid bulbs and proximal internal carotid arteries bilaterally. Estimated peak systolic velocity in right internal carotid artery is 81 cm/s. Antegrade flow is present in the right vertebral  artery. Estimated peak systolic velocity in left internal carotid artery is 83 cm/s. Antegrade flow is present in the left vertebral artery. Impression: No hemodynamically significant internal carotid artery stenosis. Scattered plaques bilaterally Antegrade flow within the vertebral arteries Electronically signed by:  Ana Condno MD  11/11/2022 3:43 PM CST Workstation: [id number]       Assessment/Plan:     Active Hospital Problems    Diagnosis    *Atrial fibrillation with RVR    Hypokalemia    Elevated troponin    CAD s/p PCI    Diastolic dysfunction    Anticoagulated on Coumadin    Non-compliance    Anemia of chronic disease    Anxiety and depression    ESRD on HD via are right upper extremity AVF MWF    Multiple-type hyperlipidemia     01/21/2015:-Patient is tolerating her medications. She also has chronic kidney disease.       Plan:  Admit observation, telemetry floor, continuous cardiac monitoring   Known history of atrial fibrillation, missed at least 3 days of medication, RVR after dialysis, resume diltiazem and monitor heart rate and blood pressure   Ordered stat INR now, dose Coumadin as per level, trending INR level   Check TSH and magnesium level, 20 mEq oral potassium supplementation and trending   February 2022, left heart catheterization patent left circumflex stent, no other obstructive coronary artery disease lesion, transthoracic echocardiogram EF of 65% with grade 2 diastolic dysfunction with mild to moderate tricuspid regurgitation   Continue education/intervention to ensure patient compliance   Serial troponin for completeness   Renally dose all medications and avoid nephrotoxin drugs  Fall and delirium precautions   A.m. labs ordered  Cardiology consulted   Further plan as per clinical course    VTE Risk Mitigation (From admission, onward)    None             Melissa Wu MD  Department of Hospital Medicine   [facility] - Emergency Dept

## 2022-12-09 NOTE — ED PROVIDER NOTES
Encounter Date: 12/9/2022       History     Chief Complaint   Patient presents with    weakness    afib RVR     Patient presents complaining of fast heart rate.  Patient is sent from dialysis secondary to heart rate greater than 150.  Patient states she feels weak and dizzy.  Patient received full dialysis today.  Patient states she is not been taking her medication secondary to confusion regarding which drugs to take.    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    Osteopenia of multiple sites 3/9/2018    Based upon bone density measurements. Patient also has chronic kidney disease.    Stroke 11/2016     Past Surgical History:   Procedure Laterality Date    ANGIOGRAM, CORONARY, WITH LEFT HEART CATHETERIZATION N/A 2/7/2022    Procedure: Angiogram, Coronary, with Left Heart Cath;  Surgeon: Gino Leal MD;  Location: [facility] CATH/EP LAB;  Service: Cardiology;  Laterality: N/A;    CARDIAC SURGERY      stents    EYE SURGERY      WRIST SURGERY       Family History   Problem Relation Age of Onset    Heart disease Mother     Cancer Father      Social History     Tobacco Use    Smoking status: Never    Smokeless tobacco: Never   Substance Use Topics    Alcohol use: No    Drug use: No     Review of Systems   All other systems reviewed and are negative.    Physical Exam     Initial Vitals [12/09/22 1140]   BP  Pulse Resp Temp SpO2   (!) 96/52 (!) 168 (!) 26 97.8 °F (36.6 °C) 96 %      MAP       --         Physical Exam    Nursing note and vitals reviewed.  Constitutional:   Pleasant, polite weak, frail   HENT:   Head: Normocephalic and atraumatic.   Eyes: EOM are normal.   Neck: Neck supple.   Normal range of motion.  Cardiovascular:            Irregularly irregular, tachycardic   Pulmonary/Chest: No respiratory distress.   Abdominal: Abdomen is soft.   Musculoskeletal:         General: Normal range of motion.      Cervical back: Normal range of motion and neck supple.     Neurological: She is alert and oriented to person, place, and time. She has normal strength.   Skin: Skin is warm and dry. Capillary refill takes less than 2 seconds.   Psychiatric: She has a normal mood and affect. Her behavior is normal. Judgment and thought content normal.       ED Course   Procedures  Labs Reviewed   CBC W/ AUTO DIFFERENTIAL - Abnormal; Notable for the following components:       Result Value    RBC 3.36 (*)     Hemoglobin 10.4 (*)     Hematocrit 33.3 (*)     MCV 99 (*)     MCHC 31.2 (*)     Mono # 1.5 (*)     Lymph % 16.9 (*)     Mono % 15.3 (*)     All other components within normal limits   COMPREHENSIVE METABOLIC PANEL - Abnormal; Notable for the following components:    Potassium 3.2 (*)     Creatinine 4.0 (*)     Calcium 8.2 (*)     Albumin 3.0 (*)     eGFR 10.7 (*)     All other components within normal limits   TROPONIN I HIGH SENSITIVITY - Abnormal; Notable for the following components:    Troponin I High Sensitivity 27.5 (*)     All other components within normal limits   B-TYPE NATRIURETIC PEPTIDE - Abnormal; Notable for the following components:     (*)     All other components within normal limits   TROPONIN I HIGH SENSITIVITY        ECG Results              EKG 12-lead (In process)  Result time 12/09/22 11:54:28      In process by Interface, Lab In [facility] (12/09/22 11:54:28)                   Narrative:    Test  Reason : R07.9,    Vent. Rate : 148 BPM     Atrial Rate : 159 BPM     P-R Int : 000 ms          QRS Dur : 076 ms      QT Int : 256 ms       P-R-T Axes : 000 014 201 degrees     QTc Int : 401 ms    Atrial fibrillation with rapid ventricular response  Septal infarct (cited on or before 03-FEB-2022)  ST and T wave abnormality, consider inferior ischemia  ST and T wave abnormality, consider anterolateral ischemia  Abnormal ECG  When compared with ECG of 07-FEB-2022 18:32,  Significant changes have occurred    Referred By:             Confirmed By:                                   Imaging Results              X-Ray Chest AP Portable (Final result)  Result time 12/09/22 13:19:36      Final result by Martin Woody MD (12/09/22 13:19:36)                   Narrative:    Chest single view    CLINICAL DATA: Chest pain    FINDINGS: Comparison to February 2. Heart size is upper normal. The thoracic aorta is elongated. Mild interstitial prominence is once again noted. There is development of faint atelectasis or infiltrate at the right lung base. There are no pleural effusions.    IMPRESSION:  1. Mild atelectasis or infiltrate at the right lung base, new compared to February 2022.  2. Mild diffuse abnormal interstitial prominence, stable.    Electronically signed by:  Martin Woody MD  12/9/2022 1:19 PM CST Workstation: [id number]                                     Medications   digoxin injection 125 mcg (125 mcg Intravenous Given 12/9/22 1154)   diltiaZEM injection 10 mg (10 mg Intravenous Given 12/9/22 1204)     Medical Decision Making:   Initial Assessment:   No apparent distress  Differential Diagnosis:   Considerations include but are not limited to atrial fibrillation with rapid ventricular rate, electrolyte abnormalities, dehydration, medication noncompliance  Independently Interpreted Test(s):   I have ordered and independently interpreted EKG Reading(s) - see summary below       <> Summary of EKG Reading(s):  Initial EKG with irregular irregular rhythm, tachycardic, no ST elevation     After medication patient's repeat EKG now with normal rate.  Clinical Tests:   Lab Tests: Reviewed and Ordered  Radiological Study: Ordered and Reviewed  Medical Tests: Reviewed and Ordered  ED Management:  Patient started on Cardizem in the feel which dropped her blood pressure to the 80s.  On arrival digoxin was used which improved patient's rate to the 80s.  Patient also received 10 mg of Cardizem after this.  She has remained in a normal rate and blood pressure has stabilized.  Patient has been consulted to Hospital Medicine for atrial fibrillation with rapid ventricular rate.  She remained stable.      Attending Critical Care:   Critical Care Times:   Direct Patient Care (initial evaluation, reassessments, and time considering the case)................................................................10 minutes.   Additional History from reviewing old medical records or taking additional history from the family, EMS, PCP, etc.......................10 minutes.   Ordering, Reviewing, and Interpreting Diagnostic Studies...............................................................................................................10 minutes.   Documentation..................................................................................................................................................................................5 minutes.   ==============================================================  · Total Critical Care Time - exclusive of procedural time: 35 minutes.  ==============================================================  Critical care was necessary to treat or prevent imminent or life-threatening deterioration of the following conditions:  Atrial fibrillation with rapid ventricular rate.   Critical care was time spent personally by me on the following activities: obtaining history from patient or relative, examination of  patient, review of x-rays / CT sent with the patient, ordering lab, x-rays, and/or EKG, development of treatment plan with patient or relative, ordering and performing treatments and interventions, interpretation of cardiac measurements and re-evaluation of patient's condition.   Critical Care Condition: potentially life-threatening                           Clinical Impression:   Final diagnoses:  [R07.9] Chest pain  [I48.91] Atrial fibrillation with RVR (Primary)        ED Disposition Condition    Admit Stable                Ramon Bose MD  12/09/22 2138

## 2022-12-10 VITALS
HEART RATE: 74 BPM | RESPIRATION RATE: 18 BRPM | DIASTOLIC BLOOD PRESSURE: 61 MMHG | TEMPERATURE: 100 F | OXYGEN SATURATION: 95 % | WEIGHT: 116.19 LBS | HEIGHT: 60 IN | BODY MASS INDEX: 22.81 KG/M2 | SYSTOLIC BLOOD PRESSURE: 116 MMHG

## 2022-12-10 LAB
ANION GAP SERPL CALC-SCNC: 10 MMOL/L (ref 8–16)
BUN SERPL-MCNC: 28 MG/DL (ref 8–23)
CALCIUM SERPL-MCNC: 8.2 MG/DL (ref 8.7–10.5)
CHLORIDE SERPL-SCNC: 98 MMOL/L (ref 95–110)
CO2 SERPL-SCNC: 30 MMOL/L (ref 23–29)
CREAT SERPL-MCNC: 5.9 MG/DL (ref 0.5–1.4)
ERYTHROCYTE [DISTWIDTH] IN BLOOD BY AUTOMATED COUNT: 12.9 % (ref 11.5–14.5)
EST. GFR  (NO RACE VARIABLE): 6.7 ML/MIN/1.73 M^2
GLUCOSE SERPL-MCNC: 86 MG/DL (ref 70–110)
HCT VFR BLD AUTO: 30.4 % (ref 37–48.5)
HGB BLD-MCNC: 9.4 G/DL (ref 12–16)
INR PPP: 1.6
MAGNESIUM SERPL-MCNC: 2.3 MG/DL (ref 1.6–2.6)
MCH RBC QN AUTO: 30.8 PG (ref 27–31)
MCHC RBC AUTO-ENTMCNC: 30.9 G/DL (ref 32–36)
MCV RBC AUTO: 100 FL (ref 82–98)
PHOSPHATE SERPL-MCNC: 4.6 MG/DL (ref 2.7–4.5)
PLATELET # BLD AUTO: 309 K/UL (ref 150–450)
PMV BLD AUTO: 9.2 FL (ref 9.2–12.9)
POTASSIUM SERPL-SCNC: 4.5 MMOL/L (ref 3.5–5.1)
PROTHROMBIN TIME: 18.2 SEC (ref 11.4–13.7)
RBC # BLD AUTO: 3.05 M/UL (ref 4–5.4)
SODIUM SERPL-SCNC: 138 MMOL/L (ref 136–145)
TROPONIN I SERPL HS-MCNC: 490.6 PG/ML (ref 0–14.9)
WBC # BLD AUTO: 7.15 K/UL (ref 3.9–12.7)

## 2022-12-10 PROCEDURE — 25000003 PHARM REV CODE 250: Performed by: INTERNAL MEDICINE

## 2022-12-10 PROCEDURE — 84100 ASSAY OF PHOSPHORUS: CPT | Performed by: INTERNAL MEDICINE

## 2022-12-10 PROCEDURE — 36415 COLL VENOUS BLD VENIPUNCTURE: CPT | Performed by: INTERNAL MEDICINE

## 2022-12-10 PROCEDURE — G0378 HOSPITAL OBSERVATION PER HR: HCPCS

## 2022-12-10 PROCEDURE — 84484 ASSAY OF TROPONIN QUANT: CPT | Performed by: INTERNAL MEDICINE

## 2022-12-10 PROCEDURE — 85027 COMPLETE CBC AUTOMATED: CPT | Performed by: INTERNAL MEDICINE

## 2022-12-10 PROCEDURE — 93005 ELECTROCARDIOGRAM TRACING: CPT | Performed by: INTERNAL MEDICINE

## 2022-12-10 PROCEDURE — 93010 EKG 12-LEAD: ICD-10-PCS | Mod: ,,, | Performed by: INTERNAL MEDICINE

## 2022-12-10 PROCEDURE — 80048 BASIC METABOLIC PNL TOTAL CA: CPT | Performed by: INTERNAL MEDICINE

## 2022-12-10 PROCEDURE — 83735 ASSAY OF MAGNESIUM: CPT | Performed by: INTERNAL MEDICINE

## 2022-12-10 PROCEDURE — 93010 ELECTROCARDIOGRAM REPORT: CPT | Mod: ,,, | Performed by: INTERNAL MEDICINE

## 2022-12-10 PROCEDURE — 85610 PROTHROMBIN TIME: CPT | Performed by: INTERNAL MEDICINE

## 2022-12-10 RX ADMIN — DORZOLAMIDE HYDROCHLORIDE AND TIMOLOL MALEATE 1 DROP: 22.3; 6.8 SOLUTION/ DROPS OPHTHALMIC at 10:12

## 2022-12-10 RX ADMIN — CALCIUM ACETATE 667 MG: 667 CAPSULE ORAL at 12:12

## 2022-12-10 NOTE — PLAN OF CARE
[facility]  Initial Discharge Assessment       Primary Care Provider: Kalpesh Goel MD    Admission Diagnosis: Atrial fibrillation with RVR [I48.91]    Admission Date: 12/9/2022  Expected Discharge Date:     Discharge Barriers Identified: None    Payor: HU[employer] MANAGED MEDICARE / Plan: [employer] MEDICARE HMO / Product Type: Capitation /     Extended Emergency Contact Information  Primary Emergency Contact: Raffi Isaac  Address: [address] [place] [address] [place] [address] [place]  Mobile Phone: [phone]  Relation: Son   needed? No    Discharge Plan A: Home  Discharge Plan B: Home      [facility] - [place] [address]  [address]  [place] [address]  Phone: [phone] Fax: [phone]      Initial Assessment (most recent)       Adult Discharge Assessment - 12/10/22 0847          Discharge Assessment    Assessment Type Discharge Planning Assessment     Confirmed/corrected address, phone number and insurance Yes     Confirmed Demographics Correct on Facesheet     Source of Information patient     When was your last doctors appointment? --   Pt doesn't know date    Does patient/caregiver understand observation status Yes     Communicated ILAN with patient/caregiver Date not available/Unable to determine     Reason For Admission A-fib with RVR     People in Home alone     Do you expect to return to your current living situation? Yes     Do you have help at home or someone to help you manage your care at home? Yes     Who are your caregiver(s) and their phone number(s)? Raffi Isaac [phone]     Prior to hospitilization cognitive status: Alert/Oriented     Current cognitive status: Alert/Oriented     Equipment Currently Used at Home none     Readmission within 30 days? No     Patient currently being followed by outpatient case management? No     Do you currently have service(s) that help you manage your care at home? No     Do you  take prescription medications? Yes     Do you have prescription coverage? Yes     Coverage [employer]     Do you have any problems affording any of your prescribed medications? No     Is the patient taking medications as prescribed? yes     Who is going to help you get home at discharge? Raffi Isaac - son     How do you get to doctors appointments? family or friend will provide     Are you on dialysis? Yes     Dialysis Name and Scheduled days [facility] - Mon, Wed, Fri     Do you take coumadin? No     Discharge Plan A Home     Discharge Plan B Home     DME Needed Upon Discharge  none     Discharge Plan discussed with: Patient     Discharge Barriers Identified None

## 2022-12-10 NOTE — NURSING
Patient troponin level called to Dr. Santos - new orders receive - ekg done- nurse will continue to monitor for any change in condition....

## 2022-12-10 NOTE — PLAN OF CARE
12/10/22 1037   WARD Message   Medicare Outpatient and Observation Notification regarding financial responsibility Given to patient/caregiver;Signed/date by patient/caregiver   Date WARD was signed 12/10/22   Time WARD was signed 1037

## 2022-12-10 NOTE — PROGRESS NOTES
Nephrology Consult Note        Patient Name: Tyra Isaac  MRN: [id number]    Patient Class: OP- Observation   Admission Date: 12/9/2022  Length of Stay: 0 days  Date of Service: 12/10/2022    Attending Physician: Melissa Wu MD  Primary Care Provider: Kalpesh Goel MD    Reason for Consult: esrd/anemia/htn/shpt/afib with rvr    SUBJECTIVE:     HPI: 82F with  ESRD omn HD MWF was sent from [facility] where she completed a dialysis session today but developed weakness, dizziness and tachycardia in the 150's. She did not take some of her meds for few days due to some confusion what she should take. She is otherwise very compliant and well controlled patient.    12/10 VSS, no new complains.    Past Medical History:   Diagnosis Date    A-fib     Anxiety     Depression     Disorder of kidney and ureter     Encephalopathy acute 1/1/2018    End stage kidney disease 6/17/2017    Gout     Hyperlipidemia     Hypertension     Moderate episode of recurrent major depressive disorder 1/17/2018    Nephropathy hypertensive, 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    Osteopenia of multiple sites 3/9/2018    Based upon bone density measurements. Patient also has chronic kidney disease.    Stroke 11/2016     Past Surgical History:   Procedure Laterality Date    ANGIOGRAM, CORONARY, WITH LEFT HEART CATHETERIZATION N/A 2/7/2022    Procedure: Angiogram, Coronary, with Left Heart Cath;  Surgeon: Gino Leal MD;  Location: [facility] CATH/EP LAB;  Service: Cardiology;  Laterality: N/A;    CARDIAC SURGERY      stents    EYE SURGERY      WRIST SURGERY       Family History   Problem Relation Age of Onset    Heart disease Mother     Cancer Father      Social History     Tobacco Use    Smoking status: Never    Smokeless tobacco: Never   Substance Use Topics    Alcohol use: No    Drug use: No       Review of  patient's allergies indicates:   Allergen Reactions    Cyclobenzaprine     Fish containing products Hives    Peanut Other (See Comments)    Tramadol Itching       Outpatient meds:  No current facility-administered medications on file prior to encounter.     Current Outpatient Medications on File Prior to Encounter   Medication Sig Dispense Refill    amiodarone (PACERONE) 200 MG Tab Take 1 tablet (200 mg total) by mouth 2 (two) times daily. 60 tablet 0    amLODIPine (NORVASC) 5 MG tablet Take 5 mg by mouth once daily.      atorvastatin (LIPITOR) 40 MG tablet Take 40 mg by mouth once daily.      b complex vitamins tablet Take 1 tablet by mouth once daily.      calcium acetate,phosphat bind, (PHOSLO) 667 mg tablet Take 667 mg by mouth 2 (two) times daily with meals.      diltiaZEM (CARDIZEM CD) 120 MG Cp24 Take 120 mg by mouth 2 (two) times daily.      docusate sodium (COLACE) 100 MG capsule Take 100 mg by mouth 3 (three) times a week.      dorzolamide-timolol 2-0.5% (COSOPT) 22.3-6.8 mg/mL ophthalmic solution Place 1 drop into both eyes 2 (two) times daily.       famotidine (PEPCID) 20 MG tablet Take 1 tablet (20 mg total) by mouth nightly as needed for Heartburn. 30 tablet 5    latanoprost 0.005 % ophthalmic solution Place 1 drop into both eyes every evening.       lidocaine 5 % Crea Apply 1 application topically every Mon, Wed, Fri.       prednisoLONE acetate (PRED FORTE) 1 % DrpS   Instructions: 10 mL, INSTILL 1 DROP INTO RIGHT EYE 4 TIMES DAILY FOR 7 DAYS THEN STOP, 0 Refill(s), Type: Soft Stop      warfarin (COUMADIN) 1 MG tablet Take 1.5 tablet by mouth on Monday, Wednesday, Friday, Saturday, and Sunday      warfarin (COUMADIN) 3 MG tablet Take 3 mg by mouth twice a week. On Tuesday and thursdays      dorzolamide (TRUSOPT) 2 % ophthalmic solution 1 drop into affected eye      latanoprost, PF, 0.005 % Drop 1 drop into affected eye in the evening      vit B complex 100 no.2/herbs (VITAMIN B COMPLEX 100  2-HERBS  ORAL)       warfarin (COUMADIN) 2 MG tablet Take 2 mg by mouth every Mon, Wed, Fri.         Scheduled meds:      Infusions:      PRN meds:      Review of Systems:    OBJECTIVE:     Vital Signs and IO:  Temp:  [97.8 °F (36.6 °C)-99.7 °F (37.6 °C)]   Pulse:  []   Resp:  [18-36]   BP: ()/(52-93)   SpO2:  [90 %-98 %]   No intake/output data recorded.  Wt Readings from Last 5 Encounters:   12/10/22 52.7 kg (116 lb 2.9 oz)   12/07/22 53.1 kg (117 lb)   06/15/22 52.6 kg (116 lb)   02/18/22 54.9 kg (121 lb)   02/14/22 54.4 kg (120 lb)     Body mass index is 22.69 kg/m².    Physical Exam  Constitutional:       General: She is not in acute distress.     Appearance: She is well-developed. She is not diaphoretic.   HENT:      Head: Normocephalic and atraumatic.      Mouth/Throat:      Mouth: Mucous membranes are moist.   Eyes:      General: No scleral icterus.     Pupils: Pupils are equal, round, and reactive to light.   Cardiovascular:      Rate and Rhythm: Normal rate and regular rhythm.   Pulmonary:      Effort: Pulmonary effort is normal. No respiratory distress.      Breath sounds: No stridor.   Abdominal:      General: There is no distension.      Palpations: Abdomen is soft.   Musculoskeletal:         General: No deformity. Normal range of motion.      Cervical back: Neck supple.   Skin:     General: Skin is warm and dry.      Findings: No erythema or rash.   Neurological:      Mental Status: She is alert and oriented to person, place, and time.      Cranial Nerves: No cranial nerve deficit.   Psychiatric:         Behavior: Behavior normal.     Laboratory:  Recent Labs   Lab 12/09/22  1145 12/10/22  0444    138   K 3.2* 4.5   CL 98 98   CO2 29 30*   BUN 15 28*   CREATININE 4.0* 5.9*   GLU 90 86         Recent Labs   Lab 12/09/22  1145 12/10/22  0444   CALCIUM 8.2* 8.2*   ALBUMIN 3.0*  --    PHOS  --  4.6*   MG 1.7 2.3               No results for input(s): POCTGLUCOSE in the last 168 hours.    Recent  Labs   Lab 01/26/22  1525   Hemoglobin A1C 5.3         Recent Labs   Lab 12/09/22  1145 12/10/22  0444   WBC 9.70 7.15   HGB 10.4* 9.4*   HCT 33.3* 30.4*    309   MCV 99* 100*   MCHC 31.2* 30.9*   MONO 15.3*  1.5*  --          Recent Labs   Lab 12/09/22  1145   BILITOT 1.0   PROT 7.2   ALBUMIN 3.0*   ALKPHOS 62   ALT 21   AST 21         Recent Labs   Lab 02/02/22  1605   Color, UA Yellow   Appearance, UA Clear   pH, UA >8.0 A   Specific Gravity, UA 1.005   Protein, UA 1+ A   Glucose, UA Negative   Ketones, UA Negative   Urobilinogen, UA Negative   Bilirubin (UA) Negative   Occult Blood UA Negative   Nitrite, UA Negative   RBC, UA 1   WBC, UA 1   Bacteria Negative   Hyaline Casts, UA 3 A         Recent Labs   Lab 12/13/19  1243   POC PH 7.51 H   POC PCO2 43   POC PO2 90   POC SATURATED O2 97.9         Microbiology Results (last 7 days)       ** No results found for the last 168 hours. **            ASSESSMENT/PLAN:     ESRD on HD MWF via AVF  Continue current dialysis prescription.  Next HD per schedule.  Renal diet - low K, low phos.  No IVs or BP checks on access and/or non-dominant arm.    Anemia of CKD  Hgb and HCT are acceptable. Monitor for now.  Will provide BRAYAN and/or IV iron PRN.    MBD / Secondary HPT  Ca, Phos, PTH and vitamin D levels are acceptable.   Phos binders, vitamin D and analogues, calcimimetics will be given as needed.    HTN  BP seem controlled.   Tolerate asymptomatic HTN up to -160.  Continue home meds.  Low sodium diet.    AF with RVR  Plan per primary team and cards.    Thank you for allowing us to participate in the care of your patient!   We will follow the patient and provide recommendations as needed.    Patient care time was spent personally by me on the following activities:     Obtaining a history.  Examination of patient.  Providing medical care at the patients bedside.  Developing a treatment plan with patient or surrogate and bedside caregivers.  Ordering and  reviewing laboratory studies, radiographic studies, pulse oximetry.  Ordering and performing treatments and interventions.  Evaluation of patient's response to treatment.  Discussions with consultants while on the unit and immediately available to the patient.  Re-evaluation of the patient's condition.  Documentation in the medical record.     Raleigh Yee MD    [facility]  [address] [place] [address]  [place] [address]    [phone] - tel  [phone] - fax    12/10/2022

## 2022-12-10 NOTE — NURSING
End of shift -Patient - asleep- v/s stable - heart rhythm  normal sinus on monitor -respiration 18 even and unlabored-  with occasional loose cough- nadn-safety maintain throughout shift

## 2022-12-10 NOTE — DISCHARGE SUMMARY
[facility]  Discharge Summary  Patient Name: Tyra Isaac MRN: [id number]   Patient Class: OP- Observation  Length of Stay: 0   Admission Date: 12/9/2022 11:39 AM Attending Physician: Tristan Arauz MD   Primary Care Provider: Kalpesh Goel MD Face-to-Face encounter date: 12/10/2022   Chief Complaint: weakness and afib RVR    Date of Discharge: 12/10/2022  Discharge Disposition:  Home or Self Care  Condition: Stable       Reason for Hospitalization   Atrial fibrillation with RVR secondary to noncompliance  Elevated troponin secondary to demand ischemia      Patient Active Problem List   Diagnosis    Benign hypertension with ESRD (end-stage renal disease)    Body mass index (BMI) of 22.0-22.9 in adult    Calculus of gallbladder    Chronic gouty arthropathy    Chronic kidney disease, stage 4 (severe)    Dysthymia    Multiple-type hyperlipidemia    Non-smoker    Encephalopathy acute    Nephropathy hypertensive, stage 5 chronic kidney disease or end stage renal disease    Moderate episode of recurrent major depressive disorder    Osteopenia of multiple sites    Arteriosclerosis of kidney, stage 5 chronic kidney disease or end stage renal disease    Paroxysmal atrial fibrillation    DDD (degenerative disc disease), cervical    Cervical spondylosis    Bilateral shoulder bursitis    Spinal stenosis in cervical region    Cervical herniation    Bilateral carpal tunnel syndrome    Cubital tunnel syndrome on right    Numbness and tingling in right hand    Stage 5 chronic kidney disease on chronic dialysis    Pneumococcal vaccine administered    Dependence on renal dialysis    Coronary artery disease of native heart with stable angina pectoris    Atrial fibrillation with RVR with history of known paroxysmal atrial fibrillation    ESRD on HD via are right upper extremity AVF MWF    Long term (current) use of anticoagulants    Syncope    Generalized weakness    End stage renal disease on dialysis     Obstructive pattern present on pulmonary function testing    Elevated troponin    Other social stressor    Anemia of chronic disease    Anxiety and depression    Non-compliance    Atrial fibrillation with RVR    CAD s/p PCI    Hypokalemia    Diastolic dysfunction    Anticoagulated on Coumadin       Brief History of Present Illness   Ms. Isaac is an 82-year-old female who was brought to the ED via EMS with rapid heart rate.  Patient with known history of atrial fibrillation, as per home medication on diltiazem and Coumadin, she admits she did not take her medications since Tuesday, states her son usually leaves out, reports she forgot.  Known history of ESRD on HD via right upper extremity AVF, MWF, was in her usual state of health, completed today session of hemodialysis, states she was about to stand up, became dizzy, mild chest discomfort associated with rapid palpitation and shortness of breath.  Denies any nausea, vomiting, fever, associated syncope.  Denies any history of bleeding.  As per ED report she was found to be in atrial fibrillation with heart rate 160 at dialysis and transferred to the ED. She initially received 10 mg IV diltiazem which dropped her blood pressure but she soon recovered.  Subsequently given 125 mcg of digoxin and heart rate improved.  Labs with hemoglobin 10.4, potassium 3.2, , high sensitivity troponin 27.5, EKG on presentation atrial fibrillation at 148 beats per minute, chest x-ray with mild diffuse chronic changes.  Case discussed with ED provider who requested admission.  Plan of care discussed with patient.       For the full HPI please refer to the History & Physical from this admission.    Hospital Course By Problem with Pertinent Findings   Patient was admitted after presenting for her hemodialysis with a heart rate of greater than 150.  Patient admitted to not taking her medications since Tuesday.  Her heart rate improved after starting her home medications.  Enter  rates have been controlled for over 24 hours.  She did have an elevated high sensitivity troponin of 600.  She would no EKG changes as this has since trended down.  I did speak with Cardiology team who reviewed patient's chart and agreed that this was likely from demand ischemia from elevated heart rates and ESRD.  She currently has no active chest pain or any complaints and is doing well.  I have counseled her on the importance of taking her medication as given her comorbidities it is dangerous if she continues to be noncompliant.  She says she has medications at home and does not need refills at this time    Patient was seen and examined on the date of discharge and determined to be suitable for discharge.    Physical Exam  BP (!) 104/49 (BP Location: Left arm, Patient Position: Lying)   Pulse 75   Temp 100.1 °F (37.8 °C) (Oral)   Resp 18   Ht 5' (1.524 m)   Wt 52.7 kg (116 lb 2.9 oz)   SpO2 96%   BMI 22.69 kg/m²   Vitals reviewed.    Constitutional: No distress.   HENT: Atraumatic.   Cardiovascular: Normal rate, irregular rhythm  Pulmonary/Chest: Effort normal. Clear to auscultation bilaterally. No wheezes.   Abdominal: Soft. Bowel sounds are normal. Exhibits no distension and no mass. No tenderness  Neurological: Alert.   Skin: Skin is warm and dry.     Following labs were Reviewed   Recent Labs   Lab 12/10/22  0444   WBC 7.15   HGB 9.4*   HCT 30.4*      CALCIUM 8.2*      K 4.5   CO2 30*   CL 98   BUN 28*   CREATININE 5.9*     No results found for: POCTGLUCOSE         Microbiology Results (last 7 days)       ** No results found for the last 168 hours. **          X-Ray Chest AP Portable   Final Result          No results found for this or any previous visit.      Consultants and Procedures   Consultants:  Consults (From admission, onward)          Status Ordering Provider     Inpatient consult to Cardiology  Once        Provider:  Gino Leal MD    Acknowledged CHA BOLDEN      Inpatient consult to Nephrology  Once        Provider:  Raleigh Yee MD    Completed MELISSA BOLDEN     Inpatient consult to Hospitalist  Once        Provider:  Melissa Bolden MD    Acknowledged EDDIE WOLF            Discharge Information:   Diet:  Cardiac diet, renal diet    Physical Activity:  Activity as tolerated    Instructions:  1. Take all medications as prescribed  2. Keep all follow-up appointments      Follow-Up Appointments:  Please call your primary care physician to schedule an appointment in 1 week time.            Pending laboratory work/Tests to be performed/followed by the Primary care Physician:    The patient was discharged in the care of her parents//wife/family/caregiver, with discharge instructions were reviewed in written and verbal form. All pertinent questions were discussed and prescriptions were provided. The importance of making follow up appointments and compliance of medications has been stressed repeatedly. The patient will follow up in 1 week or sooner as needed with the PCP, and the patient is on board with the plan. Upon discharge, patient needs to be on following medications.    Discharge Medications:     Medication List        CONTINUE taking these medications      amiodarone 200 MG Tab  Commonly known as: PACERONE  Take 1 tablet (200 mg total) by mouth 2 (two) times daily.     amLODIPine 5 MG tablet  Commonly known as: NORVASC     atorvastatin 40 MG tablet  Commonly known as: LIPITOR     b complex vitamins tablet     calcium acetate(phosphat bind) 667 mg tablet  Commonly known as: PHOSLO     diltiaZEM 120 MG Cp24  Commonly known as: CARDIZEM CD     docusate sodium 100 MG capsule  Commonly known as: COLACE     dorzolamide 2 % ophthalmic solution  Commonly known as: TRUSOPT     dorzolamide-timolol 2-0.5% 22.3-6.8 mg/mL ophthalmic solution  Commonly known as: COSOPT     famotidine 20 MG tablet  Commonly known as: PEPCID  Take 1 tablet (20 mg total) by  mouth nightly as needed for Heartburn.     latanoprost (PF) 0.005 % Drop     latanoprost 0.005 % ophthalmic solution     LIDOcaine 5 % Crea     prednisoLONE acetate 1 % Drps  Commonly known as: PRED FORTE     VITAMIN B COMPLEX 100  2-HERBS ORAL     * warfarin 2 MG tablet  Commonly known as: COUMADIN     * warfarin 3 MG tablet  Commonly known as: COUMADIN     * warfarin 1 MG tablet  Commonly known as: COUMADIN           * This list has 3 medication(s) that are the same as other medications prescribed for you. Read the directions carefully, and ask your doctor or other care provider to review them with you.                    I spent 45 minutes preparing the discharge including reviewing records from previous encounters, preparation of discharge summary, assessing and final examination of the patient, discharge medicine reconciliation, discussing plan of care, follow up and education and prescriptions.       Tristan Renteria  [facility]ist  12/10/2022

## 2022-12-11 NOTE — PLAN OF CARE
Patient cleared for discharge from case management standpoint.   12/11/22 0925   Final Note   Assessment Type Final Discharge Note   Anticipated Discharge Disposition Home   What phone number can be called within the next 1-3 days to see how you are doing after discharge? [phone]   Post-Acute Status   Discharge Delays None known at this time         Chart and discharge orders reviewed.  Patient discharged home with no further case management needs.

## 2022-12-11 NOTE — NURSING
Discharge instructions reviewed with pt. Questions answered. Copy of discharge instructions given to pt. Discharge home via wheelchair to vehicle by Lamar sima.

## 2023-02-24 ENCOUNTER — HOSPITAL ENCOUNTER (OUTPATIENT)
Facility: HOSPITAL | Age: 83
Discharge: HOME | End: 2023-02-25
Attending: STUDENT IN AN ORGANIZED HEALTH CARE EDUCATION/TRAINING PROGRAM | Admitting: INTERNAL MEDICINE
Payer: MEDICARE

## 2023-02-24 DIAGNOSIS — Z99.2 ESRD ON DIALYSIS: Primary | ICD-10-CM

## 2023-02-24 DIAGNOSIS — G56.21 CUBITAL TUNNEL SYNDROME ON RIGHT: ICD-10-CM

## 2023-02-24 DIAGNOSIS — R20.2 NUMBNESS AND TINGLING IN RIGHT HAND: ICD-10-CM

## 2023-02-24 DIAGNOSIS — R20.0 NUMBNESS AND TINGLING IN RIGHT HAND: ICD-10-CM

## 2023-02-24 DIAGNOSIS — N18.6 END STAGE RENAL DISEASE ON DIALYSIS: ICD-10-CM

## 2023-02-24 DIAGNOSIS — E87.6 HYPOKALEMIA: ICD-10-CM

## 2023-02-24 DIAGNOSIS — Z99.2 END STAGE RENAL DISEASE ON DIALYSIS: ICD-10-CM

## 2023-02-24 DIAGNOSIS — N18.6 ESRD ON DIALYSIS: Primary | ICD-10-CM

## 2023-02-24 DIAGNOSIS — I95.9 HYPOTENSION: ICD-10-CM

## 2023-02-24 DIAGNOSIS — E83.51 HYPOCALCEMIA: ICD-10-CM

## 2023-02-24 DIAGNOSIS — R07.9 CHEST PAIN: ICD-10-CM

## 2023-02-24 DIAGNOSIS — R00.2 PALPITATIONS: ICD-10-CM

## 2023-02-24 PROBLEM — I47.10 SVT (SUPRAVENTRICULAR TACHYCARDIA): Status: ACTIVE | Noted: 2023-02-24

## 2023-02-24 LAB
ALBUMIN SERPL BCP-MCNC: 3.5 G/DL (ref 3.5–5.2)
ALP SERPL-CCNC: 72 U/L (ref 55–135)
ALT SERPL W/O P-5'-P-CCNC: 18 U/L (ref 10–44)
ANION GAP SERPL CALC-SCNC: 10 MMOL/L (ref 8–16)
APTT BLDCRRT: 37.3 SEC (ref 21–32)
AST SERPL-CCNC: 20 U/L (ref 10–40)
BASOPHILS # BLD AUTO: 0.03 K/UL (ref 0–0.2)
BASOPHILS NFR BLD: 0.4 % (ref 0–1.9)
BILIRUB SERPL-MCNC: 0.7 MG/DL (ref 0.1–1)
BNP SERPL-MCNC: 417 PG/ML (ref 0–99)
BUN SERPL-MCNC: 17 MG/DL (ref 8–23)
CALCIUM SERPL-MCNC: 8.2 MG/DL (ref 8.7–10.5)
CHLORIDE SERPL-SCNC: 98 MMOL/L (ref 95–110)
CO2 SERPL-SCNC: 31 MMOL/L (ref 23–29)
CREAT SERPL-MCNC: 4.1 MG/DL (ref 0.5–1.4)
DIFFERENTIAL METHOD: ABNORMAL
EOSINOPHIL # BLD AUTO: 0.5 K/UL (ref 0–0.5)
EOSINOPHIL NFR BLD: 8 % (ref 0–8)
ERYTHROCYTE [DISTWIDTH] IN BLOOD BY AUTOMATED COUNT: 14.2 % (ref 11.5–14.5)
EST. GFR  (NO RACE VARIABLE): 10.3 ML/MIN/1.73 M^2
GLUCOSE SERPL-MCNC: 103 MG/DL (ref 70–110)
HCT VFR BLD AUTO: 31.8 % (ref 37–48.5)
HGB BLD-MCNC: 9.8 G/DL (ref 12–16)
IMM GRANULOCYTES # BLD AUTO: 0.02 K/UL (ref 0–0.04)
IMM GRANULOCYTES NFR BLD AUTO: 0.3 % (ref 0–0.5)
INR PPP: 3.2 (ref 0.8–1.2)
LYMPHOCYTES # BLD AUTO: 1.3 K/UL (ref 1–4.8)
LYMPHOCYTES NFR BLD: 19.2 % (ref 18–48)
MAGNESIUM SERPL-MCNC: 1.7 MG/DL (ref 1.6–2.6)
MCH RBC QN AUTO: 30.6 PG (ref 27–31)
MCHC RBC AUTO-ENTMCNC: 30.8 G/DL (ref 32–36)
MCV RBC AUTO: 99 FL (ref 82–98)
MONOCYTES # BLD AUTO: 0.9 K/UL (ref 0.3–1)
MONOCYTES NFR BLD: 13 % (ref 4–15)
NEUTROPHILS # BLD AUTO: 4 K/UL (ref 1.8–7.7)
NEUTROPHILS NFR BLD: 59.1 % (ref 38–73)
NRBC BLD-RTO: 0 /100 WBC
PHOSPHATE SERPL-MCNC: 2.1 MG/DL (ref 2.7–4.5)
PLATELET # BLD AUTO: 251 K/UL (ref 150–450)
PMV BLD AUTO: 9.1 FL (ref 9.2–12.9)
POTASSIUM SERPL-SCNC: 3.2 MMOL/L (ref 3.5–5.1)
PROT SERPL-MCNC: 6.5 G/DL (ref 6–8.4)
PROTHROMBIN TIME: 32.2 SEC (ref 9–12.5)
RBC # BLD AUTO: 3.2 M/UL (ref 4–5.4)
SODIUM SERPL-SCNC: 139 MMOL/L (ref 136–145)
TROPONIN I SERPL HS-MCNC: 25 PG/ML (ref 0–14.9)
WBC # BLD AUTO: 6.78 K/UL (ref 3.9–12.7)

## 2023-02-24 PROCEDURE — 84484 ASSAY OF TROPONIN QUANT: CPT | Performed by: STUDENT IN AN ORGANIZED HEALTH CARE EDUCATION/TRAINING PROGRAM

## 2023-02-24 PROCEDURE — 96375 TX/PRO/DX INJ NEW DRUG ADDON: CPT

## 2023-02-24 PROCEDURE — G0378 HOSPITAL OBSERVATION PER HR: HCPCS

## 2023-02-24 PROCEDURE — 93010 ELECTROCARDIOGRAM REPORT: CPT | Mod: 76,,, | Performed by: SPECIALIST

## 2023-02-24 PROCEDURE — 63600175 PHARM REV CODE 636 W HCPCS: Performed by: STUDENT IN AN ORGANIZED HEALTH CARE EDUCATION/TRAINING PROGRAM

## 2023-02-24 PROCEDURE — 99285 EMERGENCY DEPT VISIT HI MDM: CPT | Mod: 25

## 2023-02-24 PROCEDURE — 85610 PROTHROMBIN TIME: CPT | Performed by: STUDENT IN AN ORGANIZED HEALTH CARE EDUCATION/TRAINING PROGRAM

## 2023-02-24 PROCEDURE — 93005 ELECTROCARDIOGRAM TRACING: CPT | Performed by: SPECIALIST

## 2023-02-24 PROCEDURE — 84100 ASSAY OF PHOSPHORUS: CPT | Performed by: STUDENT IN AN ORGANIZED HEALTH CARE EDUCATION/TRAINING PROGRAM

## 2023-02-24 PROCEDURE — 85730 THROMBOPLASTIN TIME PARTIAL: CPT | Performed by: STUDENT IN AN ORGANIZED HEALTH CARE EDUCATION/TRAINING PROGRAM

## 2023-02-24 PROCEDURE — 96365 THER/PROPH/DIAG IV INF INIT: CPT

## 2023-02-24 PROCEDURE — 85025 COMPLETE CBC W/AUTO DIFF WBC: CPT | Performed by: STUDENT IN AN ORGANIZED HEALTH CARE EDUCATION/TRAINING PROGRAM

## 2023-02-24 PROCEDURE — 96366 THER/PROPH/DIAG IV INF ADDON: CPT

## 2023-02-24 PROCEDURE — 63600175 PHARM REV CODE 636 W HCPCS: Performed by: INTERNAL MEDICINE

## 2023-02-24 PROCEDURE — 96372 THER/PROPH/DIAG INJ SC/IM: CPT | Performed by: INTERNAL MEDICINE

## 2023-02-24 PROCEDURE — 80053 COMPREHEN METABOLIC PANEL: CPT | Performed by: STUDENT IN AN ORGANIZED HEALTH CARE EDUCATION/TRAINING PROGRAM

## 2023-02-24 PROCEDURE — 83735 ASSAY OF MAGNESIUM: CPT | Performed by: STUDENT IN AN ORGANIZED HEALTH CARE EDUCATION/TRAINING PROGRAM

## 2023-02-24 PROCEDURE — 83880 ASSAY OF NATRIURETIC PEPTIDE: CPT | Performed by: STUDENT IN AN ORGANIZED HEALTH CARE EDUCATION/TRAINING PROGRAM

## 2023-02-24 PROCEDURE — 93010 EKG 12-LEAD: ICD-10-PCS | Mod: ,,, | Performed by: SPECIALIST

## 2023-02-24 RX ORDER — ENOXAPARIN SODIUM 100 MG/ML
30 INJECTION SUBCUTANEOUS EVERY 24 HOURS
Status: DISCONTINUED | OUTPATIENT
Start: 2023-02-24 | End: 2023-02-25 | Stop reason: HOSPADM

## 2023-02-24 RX ORDER — MAGNESIUM SULFATE 1 G/100ML
1 INJECTION INTRAVENOUS ONCE
Status: COMPLETED | OUTPATIENT
Start: 2023-02-24 | End: 2023-02-24

## 2023-02-24 RX ORDER — HYDRALAZINE HYDROCHLORIDE 20 MG/ML
10 INJECTION INTRAMUSCULAR; INTRAVENOUS EVERY 4 HOURS PRN
Status: DISCONTINUED | OUTPATIENT
Start: 2023-02-24 | End: 2023-02-25 | Stop reason: HOSPADM

## 2023-02-24 RX ORDER — CALCIUM GLUCONATE 20 MG/ML
1 INJECTION, SOLUTION INTRAVENOUS
Status: DISCONTINUED | OUTPATIENT
Start: 2023-02-24 | End: 2023-02-24

## 2023-02-24 RX ORDER — ADENOSINE 3 MG/ML
12 INJECTION, SOLUTION INTRAVENOUS
Status: COMPLETED | OUTPATIENT
Start: 2023-02-24 | End: 2023-02-24

## 2023-02-24 RX ORDER — DILTIAZEM HYDROCHLORIDE 5 MG/ML
20 INJECTION INTRAVENOUS
Status: DISCONTINUED | OUTPATIENT
Start: 2023-02-24 | End: 2023-02-24

## 2023-02-24 RX ORDER — ACETAMINOPHEN 325 MG/1
650 TABLET ORAL EVERY 4 HOURS PRN
Status: DISCONTINUED | OUTPATIENT
Start: 2023-02-24 | End: 2023-02-25 | Stop reason: HOSPADM

## 2023-02-24 RX ORDER — ADENOSINE 3 MG/ML
6 INJECTION, SOLUTION INTRAVENOUS
Status: COMPLETED | OUTPATIENT
Start: 2023-02-24 | End: 2023-02-24

## 2023-02-24 RX ADMIN — ENOXAPARIN SODIUM 30 MG: 100 INJECTION SUBCUTANEOUS at 07:02

## 2023-02-24 RX ADMIN — ADENOSINE 12 MG: 3 INJECTION, SOLUTION INTRAVENOUS at 11:02

## 2023-02-24 RX ADMIN — ADENOSINE 6 MG: 3 INJECTION, SOLUTION INTRAVENOUS at 11:02

## 2023-02-24 RX ADMIN — MAGNESIUM SULFATE 1 G: 1 INJECTION INTRAVENOUS at 12:02

## 2023-02-24 NOTE — SUBJECTIVE & OBJECTIVE
Past Medical History:   Diagnosis Date    A-fib     Anxiety     Depression     Disorder of kidney and ureter     Encephalopathy acute 1/1/2018    End stage kidney disease 6/17/2017    Gout     Hyperlipidemia     Hypertension     Moderate episode of recurrent major depressive disorder 1/17/2018    Nephropathy hypertensive, 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    Osteopenia of multiple sites 3/9/2018    Based upon bone density measurements. Patient also has chronic kidney disease.    Stroke 11/2016       Past Surgical History:   Procedure Laterality Date    ANGIOGRAM, CORONARY, WITH LEFT HEART CATHETERIZATION N/A 2/7/2022    Procedure: Angiogram, Coronary, with Left Heart Cath;  Surgeon: Gino Leal MD;  Location: [facility] CATH/EP LAB;  Service: Cardiology;  Laterality: N/A;    CARDIAC SURGERY      stents    EYE SURGERY      WRIST SURGERY         Review of patient's allergies indicates:   Allergen Reactions    Cyclobenzaprine     Fish containing products Hives    Peanut Other (See Comments)    Tramadol Itching       No current facility-administered medications on file prior to encounter.     Current Outpatient Medications on File Prior to Encounter   Medication Sig    amLODIPine (NORVASC) 5 MG tablet Take 5 mg by mouth once daily.    atorvastatin (LIPITOR) 40 MG tablet Take 40 mg by mouth once daily.    b complex vitamins tablet Take 1 tablet by mouth once daily.    calcium acetate,phosphat bind, (PHOSLO) 667 mg tablet Take 667 mg by mouth 2 (two) times daily with meals.    diltiaZEM (CARDIZEM CD) 120 MG Cp24 Take 120 mg by mouth 2 (two) times daily.    docusate sodium (COLACE) 100 MG capsule Take 100 mg by mouth 3 (three) times a week.    latanoprost 0.005 % ophthalmic solution Place 1 drop into both eyes every evening.     vit B complex 100 no.2/herbs (VITAMIN B COMPLEX 100  2-HERBS  ORAL) Take 1 tablet by mouth once daily.    warfarin (COUMADIN) 1 MG tablet Take 1.5 mg by mouth every Tuesday, Thursday, Saturday, Sunday.    warfarin (COUMADIN) 3 MG tablet Take 3 mg by mouth twice a week. On Tuesday and thursdays    amiodarone (PACERONE) 200 MG Tab Take 1 tablet (200 mg total) by mouth 2 (two) times daily.    dorzolamide (TRUSOPT) 2 % ophthalmic solution 1 drop into affected eye    dorzolamide-timolol 2-0.5% (COSOPT) 22.3-6.8 mg/mL ophthalmic solution Place 1 drop into both eyes 2 (two) times daily.     famotidine (PEPCID) 20 MG tablet Take 1 tablet (20 mg total) by mouth nightly as needed for Heartburn.    latanoprost, PF, 0.005 % Drop 1 drop into affected eye in the evening    lidocaine 5 % Crea Apply 1 application topically every Mon, Wed, Fri.     prednisoLONE acetate (PRED FORTE) 1 % DrpS   Instructions: 10 mL, INSTILL 1 DROP INTO RIGHT EYE 4 TIMES DAILY FOR 7 DAYS THEN STOP, 0 Refill(s), Type: Soft Stop    warfarin (COUMADIN) 2 MG tablet Take 2 mg by mouth every Mon, Wed, Fri.     Family History       Problem Relation (Age of Onset)    Cancer Father    Heart disease Mother          Tobacco Use    Smoking status: Never    Smokeless tobacco: Never   Substance and Sexual Activity    Alcohol use: No    Drug use: No    Sexual activity: Not Currently     Review of Systems   Constitutional:  Positive for fatigue. Negative for activity change and appetite change.   HENT:  Negative for congestion and dental problem.    Eyes:  Negative for discharge and itching.   Respiratory:  Negative for shortness of breath.    Cardiovascular:  Negative for chest pain.   Gastrointestinal:  Negative for abdominal distention and abdominal pain.   Endocrine: Negative for cold intolerance.   Genitourinary:  Negative for difficulty urinating and dysuria.   Musculoskeletal:  Negative for arthralgias and back pain.   Skin:  Negative for color change.   Neurological:  Negative for dizziness and facial asymmetry.    Hematological:  Negative for adenopathy.   Psychiatric/Behavioral:  Negative for agitation and behavioral problems.    Objective:     Vital Signs (Most Recent):  Temp: 98.3 °F (36.8 °C) (02/24/23 1054)  Pulse: 84 (02/24/23 1324)  Resp: 20 (02/24/23 1324)  BP: (!) 121/58 (02/24/23 1324)  SpO2: 100 % (02/24/23 1324)   Vital Signs (24h Range):  Temp:  [98.3 °F (36.8 °C)] 98.3 °F (36.8 °C)  Pulse:  [] 84  Resp:  [20-45] 20  SpO2:  [83 %-100 %] 100 %  BP: ()/(50-76) 121/58     Weight: 52.6 kg (116 lb)  Body mass index is 22.65 kg/m².    Physical Exam  Vitals and nursing note reviewed.   Constitutional:       General: She is not in acute distress.  HENT:      Head: Atraumatic.      Right Ear: External ear normal.      Left Ear: External ear normal.      Nose: Nose normal.      Mouth/Throat:      Mouth: Mucous membranes are moist.   Cardiovascular:      Rate and Rhythm: Normal rate.   Pulmonary:      Effort: Pulmonary effort is normal.   Musculoskeletal:         General: Normal range of motion.      Cervical back: Normal range of motion.   Skin:     General: Skin is warm.   Neurological:      Mental Status: She is alert and oriented to person, place, and time.   Psychiatric:         Behavior: Behavior normal.           Significant Labs: All pertinent labs within the past 24 hours have been reviewed.  CBC:   Recent Labs   Lab 02/24/23  1128   WBC 6.78   HGB 9.8*   HCT 31.8*        CMP:   Recent Labs   Lab 02/24/23  1128      K 3.2*   CL 98   CO2 31*      BUN 17   CREATININE 4.1*   CALCIUM 8.2*   PROT 6.5   ALBUMIN 3.5   BILITOT 0.7   ALKPHOS 72   AST 20   ALT 18   ANIONGAP 10       Significant Imaging: I have reviewed all pertinent imaging results/findings within the past 24 hours.

## 2023-02-24 NOTE — H&P
[facility] - Emergency Dept  Hospital Medicine  History & Physical    Patient Name: Tyra Isaac  MRN: [id number]  Patient Class: OP- Observation  Admission Date: 2/24/2023  Attending Physician: Ousmane Mcgregor MD   Primary Care Provider: Kalpesh Goel MD         Patient information was obtained from patient, past medical records, ER records and ER MD .     Subjective:     Principal Problem:Hypotension    Chief Complaint:   Chief Complaint   Patient presents with    Hypotension        HPI: 82 year old patient getting admitted with hypotension and suspected SVT  Pt had HD today and developed Hypotension and Tachycardia  She was given 2 L fluid in HD center and was transported to ER  In ER she received adenosine pushes for suspected SVT  15 minutes later pts condition subsided  Pt denies any issues    Pt got admitted on December 2022 with A Fib RVR due to noncompliance , per chart review  Cardiac cath  On 02/07/22 showed clean coronaries and no stents were inserted      Past Medical History:   Diagnosis Date    A-fib     Anxiety     Depression     Disorder of kidney and ureter     Encephalopathy acute 1/1/2018    End stage kidney disease 6/17/2017    Gout     Hyperlipidemia     Hypertension     Moderate episode of recurrent major depressive disorder 1/17/2018    Nephropathy hypertensive, stage 5 chronic kidney disease or end stage renal disease 6/17/2017    Obstructive pattern present on pulmonary function testing 7/28/2021    Shows moderate obstruction.    Osteopenia of multiple sites 3/9/2018    Based upon bone density measurements. Patient also has chronic kidney disease.    Stroke 11/2016       Past Surgical History:   Procedure Laterality Date    ANGIOGRAM, CORONARY, WITH LEFT HEART CATHETERIZATION N/A 2/7/2022    Procedure: Angiogram, Coronary, with Left Heart Cath;  Surgeon: Gino Leal MD;  Location: [facility] CATH/EP LAB;  Service: Cardiology;  Laterality: N/A;    CARDIAC  SURGERY      stents    EYE SURGERY      WRIST SURGERY         Review of patient's allergies indicates:   Allergen Reactions    Cyclobenzaprine     Fish containing products Hives    Peanut Other (See Comments)    Tramadol Itching       No current facility-administered medications on file prior to encounter.     Current Outpatient Medications on File Prior to Encounter   Medication Sig    amLODIPine (NORVASC) 5 MG tablet Take 5 mg by mouth once daily.    atorvastatin (LIPITOR) 40 MG tablet Take 40 mg by mouth once daily.    b complex vitamins tablet Take 1 tablet by mouth once daily.    calcium acetate,phosphat bind, (PHOSLO) 667 mg tablet Take 667 mg by mouth 2 (two) times daily with meals.    diltiaZEM (CARDIZEM CD) 120 MG Cp24 Take 120 mg by mouth 2 (two) times daily.    docusate sodium (COLACE) 100 MG capsule Take 100 mg by mouth 3 (three) times a week.    latanoprost 0.005 % ophthalmic solution Place 1 drop into both eyes every evening.     vit B complex 100 no.2/herbs (VITAMIN B COMPLEX 100  2-HERBS ORAL) Take 1 tablet by mouth once daily.    warfarin (COUMADIN) 1 MG tablet Take 1.5 mg by mouth every Tuesday, Thursday, Saturday, Sunday.    warfarin (COUMADIN) 3 MG tablet Take 3 mg by mouth twice a week. On Tuesday and thursdays    amiodarone (PACERONE) 200 MG Tab Take 1 tablet (200 mg total) by mouth 2 (two) times daily.    dorzolamide (TRUSOPT) 2 % ophthalmic solution 1 drop into affected eye    dorzolamide-timolol 2-0.5% (COSOPT) 22.3-6.8 mg/mL ophthalmic solution Place 1 drop into both eyes 2 (two) times daily.     famotidine (PEPCID) 20 MG tablet Take 1 tablet (20 mg total) by mouth nightly as needed for Heartburn.    latanoprost, PF, 0.005 % Drop 1 drop into affected eye in the evening    lidocaine 5 % Crea Apply 1 application topically every Mon, Wed, Fri.     prednisoLONE acetate (PRED FORTE) 1 % DrpS   Instructions: 10 mL, INSTILL 1 DROP INTO RIGHT EYE 4 TIMES DAILY FOR 7 DAYS THEN  STOP, 0 Refill(s), Type: Soft Stop    warfarin (COUMADIN) 2 MG tablet Take 2 mg by mouth every Mon, Wed, Fri.     Family History       Problem Relation (Age of Onset)    Cancer Father    Heart disease Mother          Tobacco Use    Smoking status: Never    Smokeless tobacco: Never   Substance and Sexual Activity    Alcohol use: No    Drug use: No    Sexual activity: Not Currently     Review of Systems   Constitutional:  Positive for fatigue. Negative for activity change and appetite change.   HENT:  Negative for congestion and dental problem.    Eyes:  Negative for discharge and itching.   Respiratory:  Negative for shortness of breath.    Cardiovascular:  Negative for chest pain.   Gastrointestinal:  Negative for abdominal distention and abdominal pain.   Endocrine: Negative for cold intolerance.   Genitourinary:  Negative for difficulty urinating and dysuria.   Musculoskeletal:  Negative for arthralgias and back pain.   Skin:  Negative for color change.   Neurological:  Negative for dizziness and facial asymmetry.   Hematological:  Negative for adenopathy.   Psychiatric/Behavioral:  Negative for agitation and behavioral problems.    Objective:     Vital Signs (Most Recent):  Temp: 98.3 °F (36.8 °C) (02/24/23 1054)  Pulse: 84 (02/24/23 1324)  Resp: 20 (02/24/23 1324)  BP: (!) 121/58 (02/24/23 1324)  SpO2: 100 % (02/24/23 1324)   Vital Signs (24h Range):  Temp:  [98.3 °F (36.8 °C)] 98.3 °F (36.8 °C)  Pulse:  [] 84  Resp:  [20-45] 20  SpO2:  [83 %-100 %] 100 %  BP: ()/(50-76) 121/58     Weight: 52.6 kg (116 lb)  Body mass index is 22.65 kg/m².    Physical Exam  Vitals and nursing note reviewed.   Constitutional:       General: She is not in acute distress.  HENT:      Head: Atraumatic.      Right Ear: External ear normal.      Left Ear: External ear normal.      Nose: Nose normal.      Mouth/Throat:      Mouth: Mucous membranes are moist.   Cardiovascular:      Rate and Rhythm: Normal rate.    Pulmonary:      Effort: Pulmonary effort is normal.   Musculoskeletal:         General: Normal range of motion.      Cervical back: Normal range of motion.   Skin:     General: Skin is warm.   Neurological:      Mental Status: She is alert and oriented to person, place, and time.   Psychiatric:         Behavior: Behavior normal.           Significant Labs: All pertinent labs within the past 24 hours have been reviewed.  CBC:   Recent Labs   Lab 02/24/23  1128   WBC 6.78   HGB 9.8*   HCT 31.8*        CMP:   Recent Labs   Lab 02/24/23  1128      K 3.2*   CL 98   CO2 31*      BUN 17   CREATININE 4.1*   CALCIUM 8.2*   PROT 6.5   ALBUMIN 3.5   BILITOT 0.7   ALKPHOS 72   AST 20   ALT 18   ANIONGAP 10       Significant Imaging: I have reviewed all pertinent imaging results/findings within the past 24 hours.    Assessment/Plan:     * Hypotension  Hypotension and associated Tachycardia after HD session  Condition stable now  Home tomorrow AM if stable       Noncompliance  Ongoing issue , per chart review       SVT (supraventricular tachycardia)  Suspected SVT and pt received iv adenosine in ER  Condition stable       Coronary artery disease of native heart with stable angina pectoris  Stable  Cardiac cath  On 02/07/22 showed clean coronaries and no stents were inserted      Dependence on renal dialysis  Aware       Paroxysmal atrial fibrillation  H/o A Fib  Hold meds until pt is more stable clinically          VTE Risk Mitigation (From admission, onward)         Ordered     enoxaparin injection 30 mg  Daily         02/24/23 1327     IP VTE HIGH RISK PATIENT  Once         02/24/23 1327     Place sequential compression device  Until discontinued         02/24/23 1327                   Ousmane Mcgregor MD  Department of Hospital Medicine   [facility] - Emergency Dept

## 2023-02-24 NOTE — HPI
82 year old patient getting admitted with hypotension and suspected SVT  Pt had HD today and developed Hypotension and Tachycardia  She was given 2 L fluid in HD center and was transported to ER  In ER she received adenosine pushes for suspected SVT  15 minutes later pts condition subsided  Pt denies any issues    Pt got admitted on December 2022 with A Fib RVR due to noncompliance , per chart review  Cardiac cath  On 02/07/22 showed clean coronaries and no stents were inserted

## 2023-02-24 NOTE — ED PROVIDER NOTES
Encounter Date: 2/24/2023       History     Chief Complaint   Patient presents with    Hypotension     82-year-old female with history of end-stage renal disease on Monday Wednesday Friday dialysis presents for evaluation of hypotension and tachycardia which started after dialysis today.  Patient went for her regular dialysis at [facility], they ran her net even because the patient's weight was low, and at the end of dialysis, they noticed her to be hypotensive with systolics in the 80s and tachycardic with heart rate in the 170s.  They gave her 2 L IV fluid and transferred her here for close monitoring.  On arrival, the patient was alert and oriented but somewhat of a poor historian, she was tachycardic into the 160s and hypotensive.   She has a history of AFib with RVR and is anticoagulated on Coumadin although she did not take her dose today.    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    Osteopenia of multiple sites 3/9/2018    Based upon bone density measurements. Patient also has chronic kidney disease.    Stroke 11/2016     Past Surgical History:   Procedure Laterality Date    ANGIOGRAM, CORONARY, WITH LEFT HEART CATHETERIZATION N/A 2/7/2022    Procedure: Angiogram, Coronary, with Left Heart Cath;  Surgeon: Gino Leal MD;  Location: [facility] CATH/EP LAB;  Service: Cardiology;  Laterality: N/A;     CARDIAC SURGERY      stents    EYE SURGERY      WRIST SURGERY       Family History   Problem Relation Age of Onset    Heart disease Mother     Cancer Father      Social History     Tobacco Use    Smoking status: Never    Smokeless tobacco: Never   Substance Use Topics    Alcohol use: No    Drug use: No     Review of Systems   Constitutional:  Negative for activity change, appetite change, chills, fever and unexpected weight change.   HENT:  Negative for dental problem and drooling.    Eyes:  Negative for discharge and itching.   Respiratory:  Negative for cough, chest tightness, shortness of breath, wheezing and stridor.    Cardiovascular:  Positive for palpitations. Negative for chest pain and leg swelling.   Gastrointestinal:  Negative for abdominal distention, abdominal pain, diarrhea and nausea.   Genitourinary:  Negative for difficulty urinating, dysuria, frequency and urgency.   Musculoskeletal:  Negative for back pain, gait problem and joint swelling.   Neurological:  Positive for weakness. Negative for dizziness, syncope, numbness and headaches.   Psychiatric/Behavioral:  Negative for agitation, behavioral problems and confusion.      Physical Exam     Initial Vitals [02/24/23 1054]   BP Pulse Resp Temp SpO2   (!) 82/55 (!) 176 20 98.3 °F (36.8 °C) 98 %      MAP       --         Physical Exam    Nursing note and vitals reviewed.  Constitutional: She appears well-developed and well-nourished. She is not diaphoretic.   HENT:   Head: Normocephalic and atraumatic.   Mouth/Throat: Oropharynx is clear and moist.   Eyes: EOM are normal. Pupils are equal, round, and reactive to light. Right eye exhibits no discharge. Left eye exhibits no discharge.   Neck: No tracheal deviation present.   Normal range of motion.  Cardiovascular:  Regular rhythm and intact distal pulses.           No murmur heard.  Tachycardic, regular rate   Pulmonary/Chest: No respiratory distress. She has no wheezes. She has no rales. She exhibits  no tenderness.   Abdominal: Abdomen is soft. She exhibits no distension. There is no abdominal tenderness.   Musculoskeletal:         General: No tenderness or edema. Normal range of motion.      Cervical back: Normal range of motion.      Comments: Right upper extremity fistula with palpable     Neurological: She is alert. She has normal strength. No cranial nerve deficit or sensory deficit. GCS eye subscore is 4. GCS verbal subscore is 5. GCS motor subscore is 6.   Patient has mild confusion to location, stating she is at [facility]   Skin: Skin is warm and dry. No rash noted.   Psychiatric: She has a normal mood and affect. Her behavior is normal. Thought content normal.       ED Course   Critical Care    Date/Time: 2/24/2023 12:35 PM  Performed by: Rolly Danielle MD  Authorized by: Rolly Danielle MD   Direct patient critical care time: 15 minutes  Additional history critical care time: 5 minutes  Ordering / reviewing critical care time: 5 minutes  Consulting other physicians critical care time: 5 minutes  Consult with family critical care time: 5 minutes  Total critical care time (exclusive of procedural time) : 35 minutes  Critical care was necessary to treat or prevent imminent or life-threatening deterioration of the following conditions: cardiac failure.  Critical care was time spent personally by me on the following activities: ordering and performing treatments and interventions, ordering and review of laboratory studies, ordering and review of radiographic studies, pulse oximetry, re-evaluation of patient's condition and obtaining history from patient or surrogate.      Labs Reviewed   CBC W/ AUTO DIFFERENTIAL - Abnormal; Notable for the following components:       Result Value    RBC 3.20 (*)     Hemoglobin 9.8 (*)     Hematocrit 31.8 (*)     MCV 99 (*)     MCHC 30.8 (*)     MPV 9.1 (*)     All other components within normal limits   COMPREHENSIVE METABOLIC PANEL - Abnormal; Notable  for the following components:    Potassium 3.2 (*)     CO2 31 (*)     Creatinine 4.1 (*)     Calcium 8.2 (*)     eGFR 10.3 (*)     All other components within normal limits   TROPONIN I HIGH SENSITIVITY - Abnormal; Notable for the following components:    Troponin I High Sensitivity 25.0 (*)     All other components within normal limits   B-TYPE NATRIURETIC PEPTIDE - Abnormal; Notable for the following components:     (*)     All other components within normal limits   PHOSPHORUS - Abnormal; Notable for the following components:    Phosphorus 2.1 (*)     All other components within normal limits   APTT - Abnormal; Notable for the following components:    aPTT 37.3 (*)     All other components within normal limits   PROTIME-INR - Abnormal; Notable for the following components:    Prothrombin Time 32.2 (*)     INR 3.2 (*)     All other components within normal limits   MAGNESIUM          Imaging Results              X-Ray Chest AP Portable (Final result)  Result time 02/24/23 12:32:41      Final result by Martin Woody MD (02/24/23 12:32:41)                   Narrative:    Chest single view    CLINICAL DATA: Chest pain    FINDINGS: Comparison to December 2022. Heart size is upper normal. The thoracic aorta is elongated. No active infiltrate or effusion is identified.    IMPRESSION:  1. No acute process.    Electronically signed by:  Martin Woody MD  2/24/2023 12:32 PM [facility] Workstation: [id number]                                     Medications   calcium gluconate 1 g in NS IVPB (premixed) (has no administration in time range)   magnesium sulfate in dextrose IVPB (premix) 1 g (has no administration in time range)   adenosine injection 6 mg (6 mg Intravenous Given 2/24/23 1115)   adenosine injection 12 mg (12 mg Intravenous Given 2/24/23 1120)                 ED Course as of 02/24/23 1236   Fri Feb 24, 2023   1104 Initial EKG shows SVT at a rate of 175.  Will treat with 6 mg adenosine then 12 mg  adenosine if the 6 does not work. [BS]   1125 Attempted adenosine 6 and subsequently adenosine 12 with slowing of the heart rate which showed irregularly irregular rate most consistent with underlying AFib [BS]      ED Course User Index  [BS] Rolly Danielle MD               82-year-old female with history of ESRD on Monday Wednesday Friday dialysis, AFib, chronic anticoagulation with Coumadin, presents for evaluation of hypotension tachycardia from [facility].  She was run euvolemic due to her checking in at decreased weight.  Subsequently she became hypotensive and tachycardic and they gave her 1.5 L IV fluids.  Patient placed on a monitor and found to be tachycardic and hypotensive with systolic in the 60s, heart rate 160s.  Initial EKG shows rapid narrow complex tachycardia at 165 concerning for SVT versus Afib.  Patient given 6 and then 12 of adenosine with slowed rate more consistent with AFib with RVR.  About 15 minutes later, patient subsequently converted into sinus rhythm.  She has since had a normal blood pressure.  BNP elevated at 450 consistent with patient's underlying heart disease.  Troponin mildly elevated, doubt ACS.  Workup notable for mild hypokalemia, mild hypocalcemia consistent with patient who just finished dialysis.  Recurrent EKG showed prolonged QT C2 505.  Patient had subsequent frequent PVCs, Magnesium and calcium repleted.  Will admit to hospital medicine for close monitoring of volume status electrolyte abnormalities.    Clinical Impression:   Final diagnoses:  [R07.9] Chest pain  [R00.2] Palpitations  [N18.6, Z99.2] ESRD on dialysis (Primary)  [E87.6] Hypokalemia  [E83.51] Hypocalcemia               Rolly Danielle MD  02/24/23 3571

## 2023-02-24 NOTE — ASSESSMENT & PLAN NOTE
Hypotension and associated Tachycardia after HD session  Condition stable now  Home tomorrow AM if stable

## 2023-02-25 VITALS
SYSTOLIC BLOOD PRESSURE: 118 MMHG | WEIGHT: 119.94 LBS | HEART RATE: 82 BPM | DIASTOLIC BLOOD PRESSURE: 59 MMHG | BODY MASS INDEX: 19.98 KG/M2 | OXYGEN SATURATION: 97 % | RESPIRATION RATE: 16 BRPM | TEMPERATURE: 98 F | HEIGHT: 65 IN

## 2023-02-25 PROBLEM — I95.9 HYPOTENSION: Status: RESOLVED | Noted: 2023-02-24 | Resolved: 2023-02-25

## 2023-02-25 PROBLEM — I47.10 SVT (SUPRAVENTRICULAR TACHYCARDIA): Status: RESOLVED | Noted: 2023-02-24 | Resolved: 2023-02-25

## 2023-02-25 LAB
ALBUMIN SERPL BCP-MCNC: 3.2 G/DL (ref 3.5–5.2)
ALP SERPL-CCNC: 66 U/L (ref 55–135)
ALT SERPL W/O P-5'-P-CCNC: 16 U/L (ref 10–44)
ANION GAP SERPL CALC-SCNC: 11 MMOL/L (ref 8–16)
AST SERPL-CCNC: 23 U/L (ref 10–40)
BILIRUB SERPL-MCNC: 0.6 MG/DL (ref 0.1–1)
BUN SERPL-MCNC: 28 MG/DL (ref 8–23)
CALCIUM SERPL-MCNC: 8.4 MG/DL (ref 8.7–10.5)
CHLORIDE SERPL-SCNC: 101 MMOL/L (ref 95–110)
CO2 SERPL-SCNC: 28 MMOL/L (ref 23–29)
CREAT SERPL-MCNC: 6 MG/DL (ref 0.5–1.4)
ERYTHROCYTE [DISTWIDTH] IN BLOOD BY AUTOMATED COUNT: 14.1 % (ref 11.5–14.5)
EST. GFR  (NO RACE VARIABLE): 6.6 ML/MIN/1.73 M^2
GLUCOSE SERPL-MCNC: 100 MG/DL (ref 70–110)
HCT VFR BLD AUTO: 29.5 % (ref 37–48.5)
HGB BLD-MCNC: 9.2 G/DL (ref 12–16)
MCH RBC QN AUTO: 30.7 PG (ref 27–31)
MCHC RBC AUTO-ENTMCNC: 31.2 G/DL (ref 32–36)
MCV RBC AUTO: 98 FL (ref 82–98)
PLATELET # BLD AUTO: 223 K/UL (ref 150–450)
PMV BLD AUTO: 9.4 FL (ref 9.2–12.9)
POTASSIUM SERPL-SCNC: 3.9 MMOL/L (ref 3.5–5.1)
PROT SERPL-MCNC: 5.8 G/DL (ref 6–8.4)
RBC # BLD AUTO: 3 M/UL (ref 4–5.4)
SODIUM SERPL-SCNC: 140 MMOL/L (ref 136–145)
WBC # BLD AUTO: 4.91 K/UL (ref 3.9–12.7)

## 2023-02-25 PROCEDURE — 80053 COMPREHEN METABOLIC PANEL: CPT | Performed by: INTERNAL MEDICINE

## 2023-02-25 PROCEDURE — G0378 HOSPITAL OBSERVATION PER HR: HCPCS

## 2023-02-25 PROCEDURE — 85027 COMPLETE CBC AUTOMATED: CPT | Performed by: INTERNAL MEDICINE

## 2023-02-25 PROCEDURE — 36415 COLL VENOUS BLD VENIPUNCTURE: CPT | Performed by: INTERNAL MEDICINE

## 2023-02-25 NOTE — DISCHARGE SUMMARY
[facility] Medicine  Discharge Summary      Patient Name: Tyra Isaac  MRN: [id number]  ROMMEL: [id number]  Patient Class: OP- Observation  Admission Date: 2/24/2023  Hospital Length of Stay: 0 days  Discharge Date and Time: 2/25/2023 11:25 AM  Attending Physician: No att. providers found   Discharging Provider: Ousmane Mcgregor MD  Primary Care Provider: Kalpesh Goel MD    Primary Care Team: Networked reference to record PCT     HPI:   82 year old patient getting admitted with hypotension and suspected SVT  Pt had HD today and developed Hypotension and Tachycardia  She was given 2 L fluid in HD center and was transported to ER  In ER she received adenosine pushes for suspected SVT  15 minutes later pts condition subsided  Pt denies any issues    Pt got admitted on December 2022 with A Fib RVR due to noncompliance , per chart review  Cardiac cath  On 02/07/22 showed clean coronaries and no stents were inserted      * No surgery found *      Hospital Course:    82 year old patient got  admitted with hypotension and suspected SVT  Pt had HD  and developed Hypotension and Tachycardia soon after   She was given 2 L fluid in HD center and was transported to ER  In ER she received adenosine pushes for suspected SVT  EKG showed sinus tachycardia   15 minutes later pts condition subsided  Pt was admitted for overnight observation  Pts condition came back to baseline and was discharged back to home        Goals of Care Treatment Preferences:  Code Status: Full Code      Consults:   Consults (From admission, onward)        Status Ordering Provider     Inpatient consult to Internal Medicine  Once        Provider:  MD Caridad El ALAN            Final Active Diagnoses:    Diagnosis Date Noted POA    Noncompliance [Z91.199] 02/03/2022 Not Applicable    Coronary artery disease of native heart with stable angina pectoris [I25.118] 02/14/2019 Yes    Dependence on renal dialysis [Z99.2]  "11/19/2018 Not Applicable    Paroxysmal atrial fibrillation [I48.0] 03/28/2018 Yes      Problems Resolved During this Admission:    Diagnosis Date Noted Date Resolved POA    PRINCIPAL PROBLEM:  Hypotension [I95.9] 02/24/2023 02/25/2023 Unknown    SVT (supraventricular tachycardia) [I47.1] 02/24/2023 02/25/2023 Unknown       Discharged Condition: good    Disposition: Home or Self Care    Follow Up:   Follow-up Information     primary care Follow up in 1 week(s).                     Patient Instructions:      WALKER FOR HOME USE     Order Specific Question Answer Comments   Type of Walker: Rollator with brakes and/or seat    With wheels? Yes    Height: 5' 5" (1.651 m)    Weight: 54.4 kg (119 lb 14.9 oz)    Length of need (1-99 months): 3    Does patient have medical equipment at home? none    Please check all that apply: Patient's condition impairs ambulation.    Please check all that apply: Patient is unable to safely ambulate without equipment.        Significant Diagnostic Studies: Labs:   CMP   Recent Labs   Lab 02/24/23  1128 02/25/23  0455    140   K 3.2* 3.9   CL 98 101   CO2 31* 28    100   BUN 17 28*   CREATININE 4.1* 6.0*   CALCIUM 8.2* 8.4*   PROT 6.5 5.8*   ALBUMIN 3.5 3.2*   BILITOT 0.7 0.6   ALKPHOS 72 66   AST 20 23   ALT 18 16   ANIONGAP 10 11    and CBC   Recent Labs   Lab 02/24/23  1128 02/25/23  0455   WBC 6.78 4.91   HGB 9.8* 9.2*   HCT 31.8* 29.5*    223       Pending Diagnostic Studies:     None         Medications:  Reconciled Home Medications:      Medication List      CONTINUE taking these medications    amiodarone 200 MG Tab  Commonly known as: PACERONE  Take 1 tablet (200 mg total) by mouth 2 (two) times daily.     amLODIPine 5 MG tablet  Commonly known as: NORVASC  Take 5 mg by mouth once daily.     atorvastatin 40 MG tablet  Commonly known as: LIPITOR  Take 40 mg by mouth once daily.     b complex vitamins tablet  Take 1 tablet by mouth once daily.     calcium " acetate(phosphat bind) 667 mg tablet  Commonly known as: PHOSLO  Take 667 mg by mouth 2 (two) times daily with meals.     diltiaZEM 120 MG Cp24  Commonly known as: CARDIZEM CD  Take 120 mg by mouth 2 (two) times daily.     docusate sodium 100 MG capsule  Commonly known as: COLACE  Take 100 mg by mouth 3 (three) times a week.     dorzolamide 2 % ophthalmic solution  Commonly known as: TRUSOPT  1 drop into affected eye     dorzolamide-timolol 2-0.5% 22.3-6.8 mg/mL ophthalmic solution  Commonly known as: COSOPT  Place 1 drop into both eyes 2 (two) times daily.     famotidine 20 MG tablet  Commonly known as: PEPCID  Take 1 tablet (20 mg total) by mouth nightly as needed for Heartburn.     latanoprost (PF) 0.005 % Drop  1 drop into affected eye in the evening     latanoprost 0.005 % ophthalmic solution  Place 1 drop into both eyes every evening.     LIDOcaine 5 % Crea  Apply 1 application topically every Mon, Wed, Fri.     prednisoLONE acetate 1 % Drps  Commonly known as: PRED FORTE  Instructions: 10 mL, INSTILL 1 DROP INTO RIGHT EYE 4 TIMES DAILY FOR 7 DAYS THEN STOP, 0 Refill(s), Type: Soft Stop     VITAMIN B COMPLEX 100  2-HERBS ORAL  Take 1 tablet by mouth once daily.     * warfarin 2 MG tablet  Commonly known as: COUMADIN  Take 2 mg by mouth every Mon, Wed, Fri.     * warfarin 3 MG tablet  Commonly known as: COUMADIN  Take 3 mg by mouth twice a week. On Tuesday and thursdays     * warfarin 1 MG tablet  Commonly known as: COUMADIN  Take 1.5 mg by mouth every Tuesday, Thursday, Saturday, Sunday.         * This list has 3 medication(s) that are the same as other medications prescribed for you. Read the directions carefully, and ask your doctor or other care provider to review them with you.                Indwelling Lines/Drains at time of discharge:   Lines/Drains/Airways     Drain  Duration                Hemodialysis AV Fistula Right upper arm -- days              Physical Exam   Cardiovascular: Normal rate.    Neurological: She is alert.     Time spent on the discharge of patient: 45 minutes         Ousmane Mcgregor MD  Department of Hospital Medicine  [facility]

## 2023-02-25 NOTE — NURSING
Discharge instructions given to patient. Patient verbalized understanding of discharge orders. PIV removed from left forearm. Patient to discharge home via private vehicle.

## 2023-02-25 NOTE — PLAN OF CARE
CM met with patient at bedside.  Explained the MOON.  Patient verbalized understanding the WARD.  WARD signed and scanned to .       02/25/23 0968   WARD Message   Medicare Outpatient and Observation Notification regarding financial responsibility Explained to patient/caregiver;Signed/date by patient/caregiver   Date WARD was signed 02/25/23   Time WARD was signed [id number]

## 2023-02-25 NOTE — CARE UPDATE
Patient ready to discharge.  KATY did not receive a response from Migdalia Gallagher with [facility].  Amanda and Natalie not available per secure chat.    KATY spoke with Tamara with [facility] and spoke with Susana at [facility] both stated that the earliest they can deliver the rollator would be this coming Monday.    Referral sent to [facility] via care port, Tamara reported via phone that they will have to the pt home on Monday.  KATY spoke with pt again at bedside to inform her and she stated that-that would be fine.

## 2023-02-25 NOTE — PLAN OF CARE
Patient cleared to discharge by case management stand point.  Patient discharging home self care no needs.       02/25/23 1048   Final Note   Assessment Type Final Discharge Note   Anticipated Discharge Disposition Home   Post-Acute Status   Discharge Delays None known at this time

## 2023-02-25 NOTE — HOSPITAL COURSE
82 year old patient got  admitted with hypotension and suspected SVT  Pt had HD  and developed Hypotension and Tachycardia soon after   She was given 2 L fluid in HD center and was transported to ER  In ER she received adenosine pushes for suspected SVT  EKG showed sinus tachycardia   15 minutes later pts condition subsided  Pt was admitted for overnight observation  Pts condition came back to baseline and was discharged back to home

## 2023-02-25 NOTE — PLAN OF CARE
Count includes the [facility]  Initial Discharge Assessment       Primary Care Provider: Kalpesh Goel MD    Admission Diagnosis: Hypotension [I95.9]    Admission Date: 2/24/2023  Expected Discharge Date: 2/25/2023    Cm met with pt at bedside. Verified information on the face sheet.  Patient receives dialysis at [facility] on Mon, Wed, and Fri.  She's on coumadin that's monitored by Dr. Kalpesh Goel.  Patient lives alone but son Raffi very involved and assist when needed.  Patient is unsure of who will transport her home upon discharge.  Patient requested a rollator.  MD aware via secure chat.    Discharge Barriers Identified: None        Payor: HUMANA MANAGED MEDICARE / Plan: HUMANA MEDICARE HMO / Product Type: Capitation /     Extended Emergency Contact Information  Primary Emergency Contact: Raffi Isaac  Address: [address] [place] [address] [place] [address] [place] [employer] [facility]  Mobile Phone: [phone]  Relation: Son   needed? No    Discharge Plan A: Home  Discharge Plan B: Home      [facility] [id number]  [address]  [address] [facility].  [address] [place] [address]  [place] [address]  Phone: [phone] Fax: [phone]      Initial Assessment (most recent)       Adult Discharge Assessment - 02/25/23 0931          Discharge Assessment    Assessment Type Discharge Planning Assessment     Confirmed/corrected address, phone number and insurance Yes     Confirmed Demographics Correct on Facesheet     Source of Information patient     Does patient/caregiver understand observation status Yes     Communicated ILAN with patient/caregiver Date not available/Unable to determine     Reason For Admission No active principal problem     Facility Arrived From: [facility]     Do you expect to return to your current living situation? Yes     Do you have help at home or someone to help you manage your care at home? No     Current cognitive status: Alert/Oriented     Equipment Currently Used at Home none     Patient  currently being followed by outpatient case management? No     Do you currently have service(s) that help you manage your care at home? No     Do you take prescription medications? Yes     Do you have prescription coverage? Yes     Coverage Payor:  HUMANA MANAGED MEDICARE - HUMANA MEDICARE O     Do you have any problems affording any of your prescribed medications? No     Is the patient taking medications as prescribed? yes     Who is going to help you get home at discharge? not sure     How do you get to doctors appointments? family or friend will provide     Are you on dialysis? Yes     Dialysis Name and Scheduled days [facility] mon, wed, fri     Do you take coumadin? Yes     Who monitors your labs? dr. Kalpesh Goel     Discharge Plan A Home     Discharge Plan B Home     DME Needed Upon Discharge  rollator     Discharge Plan discussed with: Patient     Discharge Barriers Identified None

## 2023-03-06 ENCOUNTER — HOSPITAL ENCOUNTER (OUTPATIENT)
Facility: HOSPITAL | Age: 83
Discharge: HOME | End: 2023-03-07
Attending: EMERGENCY MEDICINE | Admitting: HOSPITALIST
Payer: MEDICARE

## 2023-03-06 DIAGNOSIS — R07.9 CHEST PAIN: ICD-10-CM

## 2023-03-06 DIAGNOSIS — I48.91 ATRIAL FIBRILLATION WITH RVR: Primary | ICD-10-CM

## 2023-03-06 DIAGNOSIS — N18.6 END STAGE RENAL DISEASE: ICD-10-CM

## 2023-03-06 DIAGNOSIS — I48.91 A-FIB: ICD-10-CM

## 2023-03-06 LAB
ALBUMIN SERPL BCP-MCNC: 3.5 G/DL (ref 3.5–5.2)
ALP SERPL-CCNC: 62 U/L (ref 55–135)
ALT SERPL W/O P-5'-P-CCNC: 16 U/L (ref 10–44)
ANION GAP SERPL CALC-SCNC: 14 MMOL/L (ref 8–16)
AST SERPL-CCNC: 20 U/L (ref 10–40)
BASOPHILS # BLD AUTO: 0.04 K/UL (ref 0–0.2)
BASOPHILS NFR BLD: 0.6 % (ref 0–1.9)
BILIRUB SERPL-MCNC: 0.8 MG/DL (ref 0.1–1)
BNP SERPL-MCNC: 716 PG/ML (ref 0–99)
BUN SERPL-MCNC: 38 MG/DL (ref 8–23)
CALCIUM SERPL-MCNC: 9.2 MG/DL (ref 8.7–10.5)
CHLORIDE SERPL-SCNC: 100 MMOL/L (ref 95–110)
CO2 SERPL-SCNC: 28 MMOL/L (ref 23–29)
CREAT SERPL-MCNC: 8.5 MG/DL (ref 0.5–1.4)
DIFFERENTIAL METHOD: ABNORMAL
EOSINOPHIL # BLD AUTO: 0.9 K/UL (ref 0–0.5)
EOSINOPHIL NFR BLD: 14.9 % (ref 0–8)
ERYTHROCYTE [DISTWIDTH] IN BLOOD BY AUTOMATED COUNT: 13.9 % (ref 11.5–14.5)
EST. GFR  (NO RACE VARIABLE): 4.3 ML/MIN/1.73 M^2
GLUCOSE SERPL-MCNC: 119 MG/DL (ref 70–110)
HCT VFR BLD AUTO: 32.5 % (ref 37–48.5)
HGB BLD-MCNC: 9.9 G/DL (ref 12–16)
IMM GRANULOCYTES # BLD AUTO: 0.03 K/UL (ref 0–0.04)
IMM GRANULOCYTES NFR BLD AUTO: 0.5 % (ref 0–0.5)
INR PPP: 3.4 (ref 0.8–1.2)
LYMPHOCYTES # BLD AUTO: 1.2 K/UL (ref 1–4.8)
LYMPHOCYTES NFR BLD: 18.5 % (ref 18–48)
MAGNESIUM SERPL-MCNC: 2 MG/DL (ref 1.6–2.6)
MCH RBC QN AUTO: 30.3 PG (ref 27–31)
MCHC RBC AUTO-ENTMCNC: 30.5 G/DL (ref 32–36)
MCV RBC AUTO: 99 FL (ref 82–98)
MONOCYTES # BLD AUTO: 0.7 K/UL (ref 0.3–1)
MONOCYTES NFR BLD: 10.8 % (ref 4–15)
NEUTROPHILS # BLD AUTO: 3.4 K/UL (ref 1.8–7.7)
NEUTROPHILS NFR BLD: 54.7 % (ref 38–73)
NRBC BLD-RTO: 0 /100 WBC
PLATELET # BLD AUTO: 318 K/UL (ref 150–450)
PMV BLD AUTO: 9.1 FL (ref 9.2–12.9)
POTASSIUM SERPL-SCNC: 4.1 MMOL/L (ref 3.5–5.1)
PROT SERPL-MCNC: 6.9 G/DL (ref 6–8.4)
PROTHROMBIN TIME: 34.1 SEC (ref 9–12.5)
RBC # BLD AUTO: 3.27 M/UL (ref 4–5.4)
SODIUM SERPL-SCNC: 142 MMOL/L (ref 136–145)
TROPONIN I SERPL HS-MCNC: 51.3 PG/ML (ref 0–14.9)
TROPONIN I SERPL HS-MCNC: 62.4 PG/ML (ref 0–14.9)
WBC # BLD AUTO: 6.23 K/UL (ref 3.9–12.7)

## 2023-03-06 PROCEDURE — 25000003 PHARM REV CODE 250: Performed by: EMERGENCY MEDICINE

## 2023-03-06 PROCEDURE — 93010 EKG 12-LEAD: ICD-10-PCS | Mod: ,,, | Performed by: INTERNAL MEDICINE

## 2023-03-06 PROCEDURE — 90935 HEMODIALYSIS ONE EVALUATION: CPT

## 2023-03-06 PROCEDURE — G0378 HOSPITAL OBSERVATION PER HR: HCPCS

## 2023-03-06 PROCEDURE — 99285 EMERGENCY DEPT VISIT HI MDM: CPT | Mod: 25

## 2023-03-06 PROCEDURE — 25000003 PHARM REV CODE 250: Performed by: HOSPITALIST

## 2023-03-06 PROCEDURE — 25000003 PHARM REV CODE 250: Performed by: INTERNAL MEDICINE

## 2023-03-06 PROCEDURE — 84484 ASSAY OF TROPONIN QUANT: CPT | Mod: 91 | Performed by: HOSPITALIST

## 2023-03-06 PROCEDURE — 93005 ELECTROCARDIOGRAM TRACING: CPT | Mod: 59 | Performed by: INTERNAL MEDICINE

## 2023-03-06 PROCEDURE — 96366 THER/PROPH/DIAG IV INF ADDON: CPT | Mod: 59

## 2023-03-06 PROCEDURE — 63600175 PHARM REV CODE 636 W HCPCS: Mod: JZ,JG | Performed by: INTERNAL MEDICINE

## 2023-03-06 PROCEDURE — 96375 TX/PRO/DX INJ NEW DRUG ADDON: CPT | Mod: 59

## 2023-03-06 PROCEDURE — 93005 ELECTROCARDIOGRAM TRACING: CPT | Performed by: GENERAL PRACTICE

## 2023-03-06 PROCEDURE — 86706 HEP B SURFACE ANTIBODY: CPT | Performed by: HOSPITALIST

## 2023-03-06 PROCEDURE — 87340 HEPATITIS B SURFACE AG IA: CPT | Performed by: HOSPITALIST

## 2023-03-06 PROCEDURE — 96366 THER/PROPH/DIAG IV INF ADDON: CPT

## 2023-03-06 PROCEDURE — 36415 COLL VENOUS BLD VENIPUNCTURE: CPT | Performed by: HOSPITALIST

## 2023-03-06 PROCEDURE — 85610 PROTHROMBIN TIME: CPT | Performed by: EMERGENCY MEDICINE

## 2023-03-06 PROCEDURE — 83880 ASSAY OF NATRIURETIC PEPTIDE: CPT | Performed by: EMERGENCY MEDICINE

## 2023-03-06 PROCEDURE — 84484 ASSAY OF TROPONIN QUANT: CPT | Performed by: EMERGENCY MEDICINE

## 2023-03-06 PROCEDURE — 80053 COMPREHEN METABOLIC PANEL: CPT | Performed by: EMERGENCY MEDICINE

## 2023-03-06 PROCEDURE — 93010 ELECTROCARDIOGRAM REPORT: CPT | Mod: ,,, | Performed by: INTERNAL MEDICINE

## 2023-03-06 PROCEDURE — 83735 ASSAY OF MAGNESIUM: CPT | Performed by: EMERGENCY MEDICINE

## 2023-03-06 PROCEDURE — 85025 COMPLETE CBC W/AUTO DIFF WBC: CPT | Performed by: EMERGENCY MEDICINE

## 2023-03-06 PROCEDURE — 96365 THER/PROPH/DIAG IV INF INIT: CPT | Mod: 59

## 2023-03-06 RX ORDER — SODIUM,POTASSIUM PHOSPHATES 280-250MG
2 POWDER IN PACKET (EA) ORAL
Status: DISCONTINUED | OUTPATIENT
Start: 2023-03-06 | End: 2023-03-07 | Stop reason: HOSPADM

## 2023-03-06 RX ORDER — LANOLIN ALCOHOL/MO/W.PET/CERES
800 CREAM (GRAM) TOPICAL
Status: DISCONTINUED | OUTPATIENT
Start: 2023-03-06 | End: 2023-03-07 | Stop reason: HOSPADM

## 2023-03-06 RX ORDER — CALCIUM ACETATE 667 MG/1
667 CAPSULE ORAL
Status: DISCONTINUED | OUTPATIENT
Start: 2023-03-06 | End: 2023-03-07 | Stop reason: HOSPADM

## 2023-03-06 RX ORDER — HEPARIN SODIUM 5000 [USP'U]/ML
5000 INJECTION, SOLUTION INTRAVENOUS; SUBCUTANEOUS ONCE
Status: CANCELLED | OUTPATIENT
Start: 2023-03-06

## 2023-03-06 RX ORDER — MUPIROCIN 20 MG/G
OINTMENT TOPICAL 2 TIMES DAILY
Status: DISCONTINUED | OUTPATIENT
Start: 2023-03-06 | End: 2023-03-07 | Stop reason: HOSPADM

## 2023-03-06 RX ORDER — WARFARIN 3 MG/1
1.5 TABLET ORAL
Status: ON HOLD | COMMUNITY
End: 2023-07-21 | Stop reason: HOSPADM

## 2023-03-06 RX ORDER — SODIUM CHLORIDE 9 MG/ML
INJECTION, SOLUTION INTRAVENOUS ONCE
Status: CANCELLED | OUTPATIENT
Start: 2023-03-06 | End: 2023-03-06

## 2023-03-06 RX ORDER — ACETAMINOPHEN 325 MG/1
650 TABLET ORAL EVERY 8 HOURS PRN
Status: DISCONTINUED | OUTPATIENT
Start: 2023-03-06 | End: 2023-03-07 | Stop reason: HOSPADM

## 2023-03-06 RX ORDER — DILTIAZEM HYDROCHLORIDE 5 MG/ML
10 INJECTION INTRAVENOUS
Status: COMPLETED | OUTPATIENT
Start: 2023-03-06 | End: 2023-03-06

## 2023-03-06 RX ORDER — ONDANSETRON 2 MG/ML
4 INJECTION INTRAMUSCULAR; INTRAVENOUS EVERY 8 HOURS PRN
Status: DISCONTINUED | OUTPATIENT
Start: 2023-03-06 | End: 2023-03-07 | Stop reason: HOSPADM

## 2023-03-06 RX ORDER — IBUPROFEN 200 MG
16 TABLET ORAL
Status: DISCONTINUED | OUTPATIENT
Start: 2023-03-06 | End: 2023-03-07 | Stop reason: HOSPADM

## 2023-03-06 RX ORDER — GLUCAGON 1 MG
1 KIT INJECTION
Status: DISCONTINUED | OUTPATIENT
Start: 2023-03-06 | End: 2023-03-07 | Stop reason: HOSPADM

## 2023-03-06 RX ORDER — FAMOTIDINE 20 MG/1
20 TABLET, FILM COATED ORAL NIGHTLY PRN
Status: DISCONTINUED | OUTPATIENT
Start: 2023-03-06 | End: 2023-03-07 | Stop reason: HOSPADM

## 2023-03-06 RX ORDER — TALC
6 POWDER (GRAM) TOPICAL NIGHTLY PRN
Status: DISCONTINUED | OUTPATIENT
Start: 2023-03-06 | End: 2023-03-07 | Stop reason: HOSPADM

## 2023-03-06 RX ORDER — NALOXONE HCL 0.4 MG/ML
0.02 VIAL (ML) INJECTION
Status: DISCONTINUED | OUTPATIENT
Start: 2023-03-06 | End: 2023-03-07 | Stop reason: HOSPADM

## 2023-03-06 RX ORDER — LATANOPROST 50 UG/ML
1 SOLUTION/ DROPS OPHTHALMIC NIGHTLY
Status: DISCONTINUED | OUTPATIENT
Start: 2023-03-06 | End: 2023-03-07 | Stop reason: HOSPADM

## 2023-03-06 RX ORDER — ATORVASTATIN CALCIUM 40 MG/1
40 TABLET, FILM COATED ORAL DAILY
Status: DISCONTINUED | OUTPATIENT
Start: 2023-03-06 | End: 2023-03-07 | Stop reason: HOSPADM

## 2023-03-06 RX ORDER — IBUPROFEN 200 MG
24 TABLET ORAL
Status: DISCONTINUED | OUTPATIENT
Start: 2023-03-06 | End: 2023-03-07 | Stop reason: HOSPADM

## 2023-03-06 RX ORDER — DILTIAZEM HYDROCHLORIDE 120 MG/1
120 CAPSULE, COATED, EXTENDED RELEASE ORAL 2 TIMES DAILY
Status: DISCONTINUED | OUTPATIENT
Start: 2023-03-06 | End: 2023-03-07

## 2023-03-06 RX ORDER — SIMETHICONE 80 MG
1 TABLET,CHEWABLE ORAL 4 TIMES DAILY PRN
Status: DISCONTINUED | OUTPATIENT
Start: 2023-03-06 | End: 2023-03-07 | Stop reason: HOSPADM

## 2023-03-06 RX ORDER — ACETAMINOPHEN 325 MG/1
650 TABLET ORAL EVERY 4 HOURS PRN
Status: DISCONTINUED | OUTPATIENT
Start: 2023-03-06 | End: 2023-03-07 | Stop reason: HOSPADM

## 2023-03-06 RX ORDER — DOCUSATE SODIUM 100 MG/1
100 CAPSULE, LIQUID FILLED ORAL
Status: DISCONTINUED | OUTPATIENT
Start: 2023-03-06 | End: 2023-03-07 | Stop reason: HOSPADM

## 2023-03-06 RX ORDER — DORZOLAMIDE HYDROCHLORIDE AND TIMOLOL MALEATE 20; 5 MG/ML; MG/ML
1 SOLUTION/ DROPS OPHTHALMIC 2 TIMES DAILY
Status: DISCONTINUED | OUTPATIENT
Start: 2023-03-06 | End: 2023-03-07 | Stop reason: HOSPADM

## 2023-03-06 RX ORDER — POLYETHYLENE GLYCOL 3350 17 G/17G
17 POWDER, FOR SOLUTION ORAL 2 TIMES DAILY
Status: DISCONTINUED | OUTPATIENT
Start: 2023-03-06 | End: 2023-03-07 | Stop reason: HOSPADM

## 2023-03-06 RX ORDER — SODIUM CHLORIDE 9 MG/ML
INJECTION, SOLUTION INTRAVENOUS
Status: CANCELLED | OUTPATIENT
Start: 2023-03-06

## 2023-03-06 RX ORDER — SODIUM CHLORIDE 0.9 % (FLUSH) 0.9 %
10 SYRINGE (ML) INJECTION EVERY 12 HOURS PRN
Status: DISCONTINUED | OUTPATIENT
Start: 2023-03-06 | End: 2023-03-07 | Stop reason: HOSPADM

## 2023-03-06 RX ADMIN — LATANOPROST 1 DROP: 50 SOLUTION OPHTHALMIC at 09:03

## 2023-03-06 RX ADMIN — DILTIAZEM HYDROCHLORIDE 5 MG/HR: 100 INJECTION, POWDER, LYOPHILIZED, FOR SOLUTION INTRAVENOUS at 08:03

## 2023-03-06 RX ADMIN — DORZOLAMIDE HYDROCHLORIDE AND TIMOLOL MALEATE 1 DROP: 22.3; 6.8 SOLUTION/ DROPS OPHTHALMIC at 09:03

## 2023-03-06 RX ADMIN — MUPIROCIN 1 G: 20 OINTMENT TOPICAL at 09:03

## 2023-03-06 RX ADMIN — EPOETIN ALFA-EPBX 2700 UNITS: 10000 INJECTION, SOLUTION INTRAVENOUS; SUBCUTANEOUS at 04:03

## 2023-03-06 RX ADMIN — POLYETHYLENE GLYCOL 3350 17 G: 17 POWDER, FOR SOLUTION ORAL at 09:03

## 2023-03-06 RX ADMIN — CALCIUM ACETATE 667 MG: 667 CAPSULE ORAL at 07:03

## 2023-03-06 RX ADMIN — DILTIAZEM HYDROCHLORIDE 10 MG: 5 INJECTION INTRAVENOUS at 07:03

## 2023-03-06 RX ADMIN — DILTIAZEM HYDROCHLORIDE 120 MG: 120 CAPSULE, COATED, EXTENDED RELEASE ORAL at 09:03

## 2023-03-06 NOTE — PROGRESS NOTES
HD tx tolerated fair  UF off during tx d/t pt c/o cramping  Net UF 1.2 L       03/06/23 1715   Handoff Report   Received From Uyen Schneider   Given To Charity   Vital Signs   Temp 98.4 °F (36.9 °C)   Temp Source Oral   Pulse 70   Resp 18   BP (!) 133/56   Assessments (Pre/Post)   Blood Liters Processed (BLP) 58   Transport Modality bed   Level of Consciousness (AVPU) alert   Dialyzer Clearance moderately streaked        Hemodialysis AV Fistula Right upper arm   No Placement Date or Time found.   Present Prior to Hospital Arrival?: Yes  Location: Right upper arm   Site Assessment Clean;Dry;Intact;No redness;No swelling   Patency Present;Thrill;Bruit   Status Deaccessed   Flows Good   Dressing Intervention First dressing   Dressing Status Clean;Dry;Intact   Site Condition No complications   Dressing Gauze   Post-Hemodialysis Assessment   Rinseback Volume (mL) 250 mL   Blood Volume Processed (Liters) 58 L   Dialyzer Clearance Moderately streaked   Duration of Treatment 180 minutes   Additional Fluid Intake (mL) 500 mL   Total UF (mL) 1724 mL   Net Fluid Removal 1224   Patient Response to Treatment Tolreated   Post-Treatment Weight 53.2 kg (117 lb 4.6 oz)   Treatment Weight Change -1.2   Arterial bleeding stop time (min) 6 min   Venous bleeding stop time (min) 6 min   Post-Hemodialysis Comments Tx complete, all blood reinfused per protocol

## 2023-03-06 NOTE — ED NOTES
Pt resting quietly. No acute distress noted. Denies any needs at this time. Will continue to monitor.

## 2023-03-06 NOTE — CONSULTS
INPATIENT NEPHROLOGY CONSULT   [facility] NEPHROLOGY    Tyra Isaac  03/06/2023    Reason for consultation:    esrd    Chief Complaint:   Chief Complaint   Patient presents with    Palpitations     Patient found to be tachycardiac when being hooked up in dialysis this AM           History of Present Illness:    Per ED  Patient presents emergency department via EMS with reported elevated heart rate noted at dialysis patient presented to dialysis this morning I have found have a heart rate in the 180s EMS was contacted no dialysis was initiated patient reports that she is chronic clear short of breath did not seem to be any worse than baseline she did not notice palpitations she does have a history of atrial fibrillation she denies any recent changes in her medications for last dialysis was Friday she was able to sit for the entire session without difficulty patient reports that she felt at baseline over the weekend did not know anything was wrong until they told her something wrong dialysis she sees Dr. Maynard      3/6  No nausea, chest pain, sob, fever, urinary or bowel complaint, new neurologic symptoms, new joint pain        Plan of Care:       Assessment:    esrd  --dialysis today.  Back in sinus rhythm    --fluid restrict  --renal dose medication per routine  --continue outpt medication  --continue binders with meals    Anemia  --erythropoiesis stimulating agent with renal replacement therapy    H/o Hyperphosphatemia  --renal diet  --continue binders    Hypertension  --uf with hd  --fluid restrict  --low salt diet  --continue home medication    Secondary hyperparathyroidism  --continue activated vit d at outpatient dialysis unit per protocol  --low phos diet    Afib RVR  --iv diltiazem started  --cardiology consultation at discretion of primary            Thank you for allowing us to participate in this patient's care. We will continue to follow.    Vital Signs:  Temp Readings from Last 3 Encounters:    03/06/23 98 °F (36.7 °C) (Oral)   02/25/23 98 °F (36.7 °C) (Oral)   12/10/22 100 °F (37.8 °C) (Oral)       Pulse Readings from Last 3 Encounters:   03/06/23 70   02/25/23 82   12/10/22 74       BP Readings from Last 3 Encounters:   03/06/23 129/60   02/25/23 (!) 118/59   12/10/22 116/61       Weight:  Wt Readings from Last 3 Encounters:   03/06/23 54.4 kg (120 lb)   02/24/23 54.4 kg (119 lb 14.9 oz)   12/10/22 52.7 kg (116 lb 2.9 oz)       Past Medical & Surgical History:  Past Medical History:   Diagnosis Date    A-fib     Anxiety     Depression     Disorder of kidney and ureter     Encephalopathy acute 1/1/2018    End stage kidney disease 6/17/2017    Gout     Hyperlipidemia     Hypertension     Moderate episode of recurrent major depressive disorder 1/17/2018    Nephropathy hypertensive, 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    Osteopenia of multiple sites 3/9/2018    Based upon bone density measurements. Patient also has chronic kidney disease.    Stroke 11/2016       Past Surgical History:   Procedure Laterality Date    ANGIOGRAM, CORONARY, WITH LEFT HEART CATHETERIZATION N/A 2/7/2022    Procedure: Angiogram, Coronary, with Left Heart Cath;  Surgeon: Gino Leal MD;  Location: [facility] CATH/EP LAB;  Service: Cardiology;  Laterality: N/A;    CARDIAC SURGERY      stents    EYE SURGERY      WRIST SURGERY         Past Social History:  Social History     Socioeconomic History    Marital status:      Spouse name: Aria Isaac    Number of children: 3   Occupational History    Occupation: Not working   Tobacco Use    Smoking status: Never    Smokeless tobacco: Never   Substance and Sexual Activity    Alcohol use: No    Drug use: No    Sexual activity: Not Currently     Social Determinants of Health     Financial Resource Strain: Medium Risk    Difficulty of Paying  Living Expenses: Somewhat hard   Food Insecurity: No Food Insecurity    Worried About Running Out of Food in the Last Year: Never true    Ran Out of Food in the Last Year: Never true   Transportation Needs: No Transportation Needs    Lack of Transportation (Medical): No    Lack of Transportation (Non-Medical): No   Physical Activity: Inactive    Days of Exercise per Week: 0 days    Minutes of Exercise per Session: 0 min   Stress: Stress Concern Present    Feeling of Stress : To some extent   Social Connections: Moderately Isolated    Frequency of Communication with Friends and Family: Three times a week    Frequency of Social Gatherings with Friends and Family: Three times a week    Attends [employer] Services: 1 to 4 times per year    Active Member of Clubs or Organizations: No    Attends Club or Organization Meetings: Never    Marital Status:    Housing Stability: Low Risk     Unable to Pay for Housing in the Last Year: No    Number of Places Lived in the Last Year: 1    Unstable Housing in the Last Year: No       Medications:  No current facility-administered medications on file prior to encounter.     Current Outpatient Medications on File Prior to Encounter   Medication Sig Dispense Refill    atorvastatin (LIPITOR) 40 MG tablet Take 40 mg by mouth once daily.      b complex vitamins tablet Take 1 tablet by mouth once daily.      calcium acetate,phosphat bind, (PHOSLO) 667 mg tablet Take 667 mg by mouth 2 (two) times daily with meals.      diltiaZEM (CARDIZEM CD) 120 MG Cp24 Take 120 mg by mouth 2 (two) times daily.      docusate sodium (COLACE) 100 MG capsule Take 100 mg by mouth 3 (three) times a week.      dorzolamide-timolol 2-0.5% (COSOPT) 22.3-6.8 mg/mL ophthalmic solution Place 1 drop into both eyes 2 (two) times daily.       latanoprost 0.005 % ophthalmic solution Place 1 drop into both eyes every evening.       lidocaine 5 % Crea Apply 1 application topically every Mon, Wed, Fri.       warfarin  "(COUMADIN) 3 MG tablet Take 1.5 mg by mouth every Tuesday, Thursday, Saturday, Sunday.      amiodarone (PACERONE) 200 MG Tab Take 1 tablet (200 mg total) by mouth 2 (two) times daily. 60 tablet 0    amLODIPine (NORVASC) 5 MG tablet Take 5 mg by mouth once daily.      dorzolamide (TRUSOPT) 2 % ophthalmic solution 1 drop into affected eye      famotidine (PEPCID) 20 MG tablet Take 1 tablet (20 mg total) by mouth nightly as needed for Heartburn. 30 tablet 5    latanoprost, PF, 0.005 % Drop 1 drop into affected eye in the evening      prednisoLONE acetate (PRED FORTE) 1 % DrpS   Instructions: 10 mL, INSTILL 1 DROP INTO RIGHT EYE 4 TIMES DAILY FOR 7 DAYS THEN STOP, 0 Refill(s), Type: Soft Stop      vit B complex 100 no.2/herbs (VITAMIN B COMPLEX 100  2-HERBS ORAL) Take 1 tablet by mouth once daily.      warfarin (COUMADIN) 1 MG tablet Take 1.5 mg by mouth every Tuesday, Thursday, Saturday, Sunday.      warfarin (COUMADIN) 2 MG tablet Take 2 mg by mouth every Mon, Wed, Fri.      warfarin (COUMADIN) 3 MG tablet Take 3 mg by mouth every Mon, Wed, Fri.       Scheduled Meds:  Continuous Infusions:   dilTIAZem 5 mg/hr (03/06/23 0802)     PRN Meds:.    Allergies:  Cyclobenzaprine, Fish containing products, Peanut, and Tramadol    Past Family History:  Reviewed; refer to Hospitalist Admission Note    Review of Systems:  Review of Systems - All 14 systems reviewed and negative, except as noted in HPI    Physical Exam:    /60   Pulse 70   Temp 98 °F (36.7 °C) (Oral)   Resp (!) 26   Ht 5' 5" (1.651 m)   Wt 54.4 kg (120 lb)   SpO2 100%   BMI 19.97 kg/m²     General Appearance:    Alert, cooperative, no distress, appears stated age   Head:    Normocephalic, without obvious abnormality, atraumatic   Eyes:    PER, conjunctiva/corneas clear, EOM's intact in both eyes        Throat:   Lips, mucosa, and tongue normal; teeth and gums normal   Back:     Symmetric, no curvature, ROM normal, no CVA tenderness   Lungs:     Clear " to auscultation bilaterally, respirations unlabored   Chest wall:    No tenderness or deformity   Heart:    RRR no rub   Abdomen:     Soft, non-tender, bowel sounds active all four quadrants,     no masses, no organomegaly   Extremities:   Extremities normal, atraumatic, no cyanosis or edema   Pulses:   2+ and symmetric all extremities   MSK:   No joint or muscle swelling, tenderness or deformity   Skin:   Skin color, texture, turgor normal, no rashes or lesions   Neurologic:   CNII-XII intact, normal strength and sensation       Throughout.  No flap     Results:  Lab Results   Component Value Date     03/06/2023    K 4.1 03/06/2023     03/06/2023    CO2 28 03/06/2023    BUN 38 (H) 03/06/2023    CREATININE 8.5 (H) 03/06/2023    CALCIUM 9.2 03/06/2023    ANIONGAP 14 03/06/2023    ESTGFRAFRICA 4.7 (A) 02/08/2022    EGFRNONAA 4.0 (A) 02/08/2022       Lab Results   Component Value Date    CALCIUM 9.2 03/06/2023    PHOS 2.1 (L) 02/24/2023       Recent Labs   Lab 03/06/23  0759   WBC 6.23   RBC 3.27*   HGB 9.9*   HCT 32.5*      MCV 99*   MCH 30.3   MCHC 30.5*          I have personally reviewed pertinent radiological imaging and reports.    Patient care was time spent personally by me on the following activities:   Obtaining a history  Examination of patient.  Providing medical care at the patients bedside.  Developing a treatment plan with patient or surrogate and bedside caregivers  Ordering and reviewing laboratory studies, radiographic studies, pulse oximetry.  Ordering and performing treatments and interventions.  Evaluation of patient's response to treatment.  Discussions with consultants while on the unit and immediately available to the patient.  Re-evaluation of the patient's condition.  Documentation in the medical record.     Jimenez Valero MD  Nephrology  [facility] [place]  [phone]

## 2023-03-06 NOTE — H&P
[facility] Medicine  History & Physical    Patient Name: Tyra Isaac  MRN: [id number]  Patient Class: OP- Observation  Admission Date: 3/6/2023  Attending Physician: Gladis De Leon MD   Primary Care Provider: Kalpesh Goel MD         Patient information was obtained from patient, past medical records and ER records.     Subjective:     Principal Problem:Atrial fibrillation with RVR    Chief Complaint:   Chief Complaint   Patient presents with    Palpitations     Patient found to be tachycardiac when being hooked up in dialysis this AM         HPI: 82-year-old lady with numerous medical problems including but not limited to ESRD on hemodialysis, hypertension, AFib on Coumadin, CAD status post PCI sent from dialysis unit due to tachycardia and palpitations.  Upon further asking patient mentioned that she was in her usual state of health until she went to dialysis unit and started feeling short of breath and palpitations.  She was found to have heart rate of more than 180 and was subsequently sent to emergency room.  She endorses some dyspnea on exertion, palpitations and episode of chest tightness during RVR otherwise denies any fever, chills, headache, dizziness, nausea, vomiting, bladder or bowel symptoms.     In ER patient was found to have AFib with RVR and required bolus of IV Cardizem.  Hospital medicine was consulted for admission.      Past Medical History:   Diagnosis Date    A-fib     Anxiety     Depression     Disorder of kidney and ureter     Encephalopathy acute 1/1/2018    End stage kidney disease 6/17/2017    Gout     Hyperlipidemia     Hypertension     Moderate episode of recurrent major depressive disorder 1/17/2018    Nephropathy hypertensive, stage 5 chronic kidney disease or end stage renal disease 6/17/2017    Obstructive pattern present on pulmonary function testing 7/28/2021    Shows moderate obstruction.    Osteopenia of multiple sites 3/9/2018     Based upon bone density measurements. Patient also has chronic kidney disease.    Stroke 11/2016       Past Surgical History:   Procedure Laterality Date    ANGIOGRAM, CORONARY, WITH LEFT HEART CATHETERIZATION N/A 2/7/2022    Procedure: Angiogram, Coronary, with Left Heart Cath;  Surgeon: Gino Leal MD;  Location: [facility] CATH/EP LAB;  Service: Cardiology;  Laterality: N/A;    CARDIAC SURGERY      stents    EYE SURGERY      WRIST SURGERY         Review of patient's allergies indicates:   Allergen Reactions    Cyclobenzaprine     Fish containing products Hives    Peanut Other (See Comments)    Tramadol Itching       No current facility-administered medications on file prior to encounter.     Current Outpatient Medications on File Prior to Encounter   Medication Sig    atorvastatin (LIPITOR) 40 MG tablet Take 40 mg by mouth once daily.    b complex vitamins tablet Take 1 tablet by mouth once daily.    calcium acetate,phosphat bind, (PHOSLO) 667 mg tablet Take 667 mg by mouth 2 (two) times daily with meals.    diltiaZEM (CARDIZEM CD) 120 MG Cp24 Take 120 mg by mouth 2 (two) times daily.    docusate sodium (COLACE) 100 MG capsule Take 100 mg by mouth 3 (three) times a week.    dorzolamide-timolol 2-0.5% (COSOPT) 22.3-6.8 mg/mL ophthalmic solution Place 1 drop into both eyes 2 (two) times daily.     latanoprost 0.005 % ophthalmic solution Place 1 drop into both eyes every evening.     lidocaine 5 % Crea Apply 1 application topically every Mon, Wed, Fri.     warfarin (COUMADIN) 3 MG tablet Take 1.5 mg by mouth every Tuesday, Thursday, Saturday, Sunday.    amiodarone (PACERONE) 200 MG Tab Take 1 tablet (200 mg total) by mouth 2 (two) times daily.    famotidine (PEPCID) 20 MG tablet Take 1 tablet (20 mg total) by mouth nightly as needed for Heartburn.    warfarin (COUMADIN) 3 MG tablet Take 3 mg by mouth every Mon, Wed, Fri.    [DISCONTINUED] amLODIPine (NORVASC) 5 MG tablet Take 5 mg by mouth once  daily.    [DISCONTINUED] dorzolamide (TRUSOPT) 2 % ophthalmic solution 1 drop into affected eye    [DISCONTINUED] latanoprost, PF, 0.005 % Drop 1 drop into affected eye in the evening    [DISCONTINUED] prednisoLONE acetate (PRED FORTE) 1 % DrpS   Instructions: 10 mL, INSTILL 1 DROP INTO RIGHT EYE 4 TIMES DAILY FOR 7 DAYS THEN STOP, 0 Refill(s), Type: Soft Stop    [DISCONTINUED] vit B complex 100 no.2/herbs (VITAMIN B COMPLEX 100  2-HERBS ORAL) Take 1 tablet by mouth once daily.    [DISCONTINUED] warfarin (COUMADIN) 1 MG tablet Take 1.5 mg by mouth every Tuesday, Thursday, Saturday, Sunday.    [DISCONTINUED] warfarin (COUMADIN) 2 MG tablet Take 2 mg by mouth every Mon, Wed, Fri.     Family History       Problem Relation (Age of Onset)    Cancer Father    Heart disease Mother          Tobacco Use    Smoking status: Never    Smokeless tobacco: Never   Substance and Sexual Activity    Alcohol use: No    Drug use: No    Sexual activity: Not Currently     Review of Systems   Constitutional:  Positive for fatigue. Negative for chills and fever.   HENT:  Negative for sore throat.    Eyes:  Negative for redness and itching.   Respiratory:  Positive for shortness of breath. Negative for chest tightness.    Cardiovascular:  Positive for chest pain and palpitations.   Gastrointestinal:  Negative for blood in stool and nausea.   Genitourinary:  Negative for dysuria.   Musculoskeletal:  Negative for gait problem and joint swelling.   Neurological:  Negative for tremors and weakness.   Psychiatric/Behavioral:  Negative for behavioral problems and sleep disturbance.    All other systems reviewed and are negative.  Objective:     Vital Signs (Most Recent):  Temp: 98 °F (36.7 °C) (03/06/23 0751)  Pulse: 76 (03/06/23 1315)  Resp: (!) 23 (03/06/23 1315)  BP: 127/60 (03/06/23 1315)  SpO2: 100 % (03/06/23 1315)   Vital Signs (24h Range):  Temp:  [98 °F (36.7 °C)] 98 °F (36.7 °C)  Pulse:  [] 76  Resp:  [17-27] 23  SpO2:   [100 %] 100 %  BP: (106-167)/(53-79) 127/60     Weight: 54.4 kg (120 lb)  Body mass index is 19.97 kg/m².    Physical Exam  Vitals and nursing note reviewed.   Constitutional:       Appearance: She is well-developed.      Comments: Chronically ill-appearing   HENT:      Head: Atraumatic.   Eyes:      Pupils: Pupils are equal, round, and reactive to light.   Neck:      Vascular: No JVD.   Cardiovascular:      Rate and Rhythm: Normal rate and regular rhythm.      Heart sounds: Normal heart sounds. No murmur heard.    No gallop.   Pulmonary:      Effort: No respiratory distress.      Breath sounds: Normal breath sounds. No wheezing.   Abdominal:      General: Bowel sounds are normal. There is no distension.      Palpations: Abdomen is soft.      Tenderness: There is no guarding or rebound.   Musculoskeletal:      Cervical back: Neck supple.   Lymphadenopathy:      Cervical: No cervical adenopathy.   Skin:     General: Skin is warm.      Capillary Refill: Capillary refill takes less than 2 seconds.      Findings: No rash.   Neurological:      General: No focal deficit present.      Mental Status: She is alert and oriented to person, place, and time.      Cranial Nerves: No cranial nerve deficit.   Psychiatric:         Behavior: Behavior normal.         CRANIAL NERVES     CN III, IV, VI   Pupils are equal, round, and reactive to light.     Significant Labs: All pertinent labs within the past 24 hours have been reviewed.    Significant Imaging: I have reviewed all pertinent imaging results/findings within the past 24 hours.    Assessment/Plan:     82-year-old lady with numerous medical problems sent from dialysis unit due to AFib RVR.  Recent admission for SVT/Afib.     Active Hospital Problems    Diagnosis  POA    *Atrial fibrillation with RVR [I48.91]  Yes    CAD s/p PCI [I25.10]  Yes     Chronic    Anticoagulated on Coumadin [Z79.01]  Not Applicable     Chronic    End stage renal disease on dialysis [N18.6, Z99.2]   Not Applicable    ESRD on HD via are right upper extremity AVF MWF [N18.6]  Yes     Chronic    Long term (current) use of anticoagulants [Z79.01]  Not Applicable    Benign hypertension with ESRD (end-stage renal disease) [I12.0, N18.6]  Yes     BP slightly elevated1.21.15 BP elelvated. Took meds in [facility] will increase amlodipine to 10 mg per day. New prescription sent to the pharmacy. She will double the existing prescription. She will continue to monitor her blood pressures.        Resolved Hospital Problems   No resolved problems to display.       Plan:  Admit to telemetry-observation  She required IV push of Cardizem in ER, brief Cardizem drip and upon my interview she remains in sinus rhythm with heart rate in 70s  Resume essential home medications  Wean Cardizem drip  Consult cardiology as patient has frequent ER visits for SVT/AFib.  Questionable compliance  2D echo  Consult nephrology for dialysis need  Serial EKGs, trend cardiac enzymes, telemetry monitoring  Patient is already on Coumadin, mildly supratherapeutic INR, will hold next couple doses of Coumadin  Further management as per clinical course  VTE Risk Mitigation (From admission, onward)         Ordered     IP VTE HIGH RISK PATIENT  Once         03/06/23 1131     Place sequential compression device  Until discontinued         03/06/23 1131     Reason for No Pharmacological VTE Prophylaxis  Once        Question:  Reasons:  Answer:  Already adequately anticoagulated on oral Anticoagulants    03/06/23 1131                   Gladis De Leon MD  Department of Hospital Medicine   [facility] - Emergency Dept

## 2023-03-06 NOTE — ED PROVIDER NOTES
Encounter Date: 3/6/2023       History     Chief Complaint   Patient presents with    Palpitations     Patient found to be tachycardiac when being hooked up in dialysis this AM      Patient presents emergency department via EMS with reported elevated heart rate noted at dialysis patient presented to dialysis this morning I have found have a heart rate in the 180s EMS was contacted no dialysis was initiated patient reports that she is chronic clear short of breath did not seem to be any worse than baseline she did not notice palpitations she does have a history of atrial fibrillation she denies any recent changes in her medications for last dialysis was Friday she was able to sit for the entire session without difficulty patient reports that she felt at baseline over the weekend did not know anything was wrong until they told her something wrong dialysis she sees Dr. Maynard      Review of patient's allergies indicates:   Allergen Reactions    Cyclobenzaprine     Fish containing products Hives    Peanut Other (See Comments)    Tramadol Itching     Past Medical History:   Diagnosis Date    A-fib     Anxiety     Depression     Disorder of kidney and ureter     Encephalopathy acute 1/1/2018    End stage kidney disease 6/17/2017    Gout     Hyperlipidemia     Hypertension     Moderate episode of recurrent major depressive disorder 1/17/2018    Nephropathy hypertensive, 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    Osteopenia of multiple sites 3/9/2018    Based upon bone density measurements. Patient also has chronic kidney disease.    Stroke 11/2016     Past Surgical History:   Procedure Laterality Date    ANGIOGRAM, CORONARY, WITH LEFT HEART CATHETERIZATION N/A 2/7/2022    Procedure: Angiogram, Coronary, with Left Heart Cath;  Surgeon: Gino Leal MD;  Location: [facility] CATH/EP LAB;  Service:  Cardiology;  Laterality: N/A;    CARDIAC SURGERY      stents    EYE SURGERY      WRIST SURGERY       Family History   Problem Relation Age of Onset    Heart disease Mother     Cancer Father      Social History     Tobacco Use    Smoking status: Never    Smokeless tobacco: Never   Substance Use Topics    Alcohol use: No    Drug use: No     Review of Systems   Constitutional:  Positive for fatigue. Negative for chills, diaphoresis and fever.   HENT:  Negative for congestion.    Respiratory:  Positive for shortness of breath. Negative for cough.    Cardiovascular:  Positive for palpitations. Negative for chest pain.   Gastrointestinal:  Negative for abdominal pain, nausea and vomiting.   All other systems reviewed and are negative.    Physical Exam     Initial Vitals [03/06/23 0751]   BP Pulse Resp Temp SpO2   (!) 167/76 (!) 173 20 98 °F (36.7 °C) 100 %      MAP       --         Physical Exam    Constitutional: She appears well-developed and well-nourished. She appears distressed.   HENT:   Head: Normocephalic and atraumatic.   Right Ear: External ear normal.   Left Ear: External ear normal.   Mouth/Throat: Oropharynx is clear and moist.   Eyes: Conjunctivae and EOM are normal. Pupils are equal, round, and reactive to light.   Neck: Neck supple.   Normal range of motion.  Cardiovascular:  Normal heart sounds and intact distal pulses.           Irregularly irregular tachycardic   Pulmonary/Chest: Breath sounds normal. No respiratory distress.   Abdominal: Abdomen is soft. Bowel sounds are normal. There is no abdominal tenderness.   Musculoskeletal:         General: No edema. Normal range of motion.      Cervical back: Normal range of motion and neck supple.     Neurological: She is alert and oriented to person, place, and time. GCS score is 15. GCS eye subscore is 4. GCS verbal subscore is 5. GCS motor subscore is 6.   Skin: Skin is warm and dry. Capillary refill takes less than 2 seconds. No rash noted.   Psychiatric:  She has a normal mood and affect. Her behavior is normal.       ED Course   Procedures  Labs Reviewed   CBC W/ AUTO DIFFERENTIAL - Abnormal; Notable for the following components:       Result Value    RBC 3.27 (*)     Hemoglobin 9.9 (*)     Hematocrit 32.5 (*)     MCV 99 (*)     MCHC 30.5 (*)     MPV 9.1 (*)     Eos # 0.9 (*)     Eosinophil % 14.9 (*)     All other components within normal limits   COMPREHENSIVE METABOLIC PANEL - Abnormal; Notable for the following components:    Glucose 119 (*)     BUN 38 (*)     Creatinine 8.5 (*)     eGFR 4.3 (*)     All other components within normal limits   B-TYPE NATRIURETIC PEPTIDE - Abnormal; Notable for the following components:     (*)     All other components within normal limits   TROPONIN I HIGH SENSITIVITY - Abnormal; Notable for the following components:    Troponin I High Sensitivity 51.3 (*)     All other components within normal limits    Narrative:     Troponin critical result(s) repeated. Called and verbal readback   obtained from [facility] ED, RN by [email or web address] 03/06/2023 08:57   PROTIME-INR - Abnormal; Notable for the following components:    Prothrombin Time 34.1 (*)     INR 3.4 (*)     All other components within normal limits   MAGNESIUM        ECG Results              EKG 12-lead (In process)  Result time 03/06/23 07:57:17      In process by Interface, Lab [facility] (03/06/23 07:57:17)                   Narrative:    Test Reason : R07.9,    Vent. Rate : 153 BPM     Atrial Rate : 174 BPM     P-R Int : 000 ms          QRS Dur : 076 ms      QT Int : 270 ms       P-R-T Axes : 000 -13 134 degrees     QTc Int : 431 ms    Atrial fibrillation with rapid ventricular response  ST and T wave abnormality, consider lateral ischemia  Abnormal ECG  When compared with ECG of 24-FEB-2023 11:43,  Atrial fibrillation has replaced Sinus rhythm  Vent. rate has increased BY  68 BPM  Minimal criteria for Anteroseptal infarct are no longer Present  Nonspecific T wave  abnormality no longer evident in Inferior leads  T wave inversion no longer evident in Anterior leads    Referred By:             Confirmed By:                                   Imaging Results              X-Ray Chest AP Portable (Final result)  Result time 03/06/23 08:22:25      Final result by Aleksandar Castillo MD (03/06/23 08:22:25)                   Narrative:    XR CHEST 1 VIEW    CLINICAL HISTORY:  82 years Female palpitations    COMPARISON: February 24, 2023    FINDINGS: Cardiac silhouette size is within normal limits for portable technique. Lordotic positioning. No confluent airspace disease. No interstitial edema. No large pleural effusion or pneumothorax. No acute osseous abnormality.    IMPRESSION:    No acute pulmonary pathology.    Electronically signed by:  Aleksandar Castillo MD  3/6/2023 8:22 AM [facility] Workstation: [id number]                                     Medications   diltiaZEM 100 mg in dextrose 5% 100 mL IVPB (ready to mix system) (non-titrating) (5 mg/hr Intravenous New Bag 3/6/23 0802)   diltiaZEM injection 10 mg (10 mg Intravenous Given 3/6/23 0757)                Attending Attestation:         Attending Critical Care:   Critical Care Times:   Direct Patient Care (initial evaluation, reassessments, and time considering the case)................................................................13 minutes.   Additional History from reviewing old medical records or taking additional history from the family, EMS, PCP, etc.......................5 minutes.   Ordering, Reviewing, and Interpreting Diagnostic Studies...............................................................................................................6 minutes.   Documentation..................................................................................................................................................................................7 minutes.   Consultation with other Physicians.  .................................................................................................................................................6 minutes.   ==============================================================  Total Critical Care Time - exclusive of procedural time: 37 minutes.  ==============================================================                     Clinical Impression:   Final diagnoses:  [I48.91] Atrial fibrillation with RVR (Primary)  [N18.6] End stage renal disease        ED Disposition Condition    Admit Stable                Sridhar Haley MD  03/06/23 0904

## 2023-03-06 NOTE — SUBJECTIVE & OBJECTIVE
Past Medical History:   Diagnosis Date    A-fib     Anxiety     Depression     Disorder of kidney and ureter     Encephalopathy acute 1/1/2018    End stage kidney disease 6/17/2017    Gout     Hyperlipidemia     Hypertension     Moderate episode of recurrent major depressive disorder 1/17/2018    Nephropathy hypertensive, 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    Osteopenia of multiple sites 3/9/2018    Based upon bone density measurements. Patient also has chronic kidney disease.    Stroke 11/2016       Past Surgical History:   Procedure Laterality Date    ANGIOGRAM, CORONARY, WITH LEFT HEART CATHETERIZATION N/A 2/7/2022    Procedure: Angiogram, Coronary, with Left Heart Cath;  Surgeon: Gino Leal MD;  Location: [facility] CATH/EP LAB;  Service: Cardiology;  Laterality: N/A;    CARDIAC SURGERY      stents    EYE SURGERY      WRIST SURGERY         Review of patient's allergies indicates:   Allergen Reactions    Cyclobenzaprine     Fish containing products Hives    Peanut Other (See Comments)    Tramadol Itching       No current facility-administered medications on file prior to encounter.     Current Outpatient Medications on File Prior to Encounter   Medication Sig    atorvastatin (LIPITOR) 40 MG tablet Take 40 mg by mouth once daily.    b complex vitamins tablet Take 1 tablet by mouth once daily.    calcium acetate,phosphat bind, (PHOSLO) 667 mg tablet Take 667 mg by mouth 2 (two) times daily with meals.    diltiaZEM (CARDIZEM CD) 120 MG Cp24 Take 120 mg by mouth 2 (two) times daily.    docusate sodium (COLACE) 100 MG capsule Take 100 mg by mouth 3 (three) times a week.    dorzolamide-timolol 2-0.5% (COSOPT) 22.3-6.8 mg/mL ophthalmic solution Place 1 drop into both eyes 2 (two) times daily.     latanoprost 0.005 % ophthalmic solution Place 1 drop into both eyes every evening.      lidocaine 5 % Crea Apply 1 application topically every Mon, Wed, Fri.     warfarin (COUMADIN) 3 MG tablet Take 1.5 mg by mouth every Tuesday, Thursday, Saturday, Sunday.    amiodarone (PACERONE) 200 MG Tab Take 1 tablet (200 mg total) by mouth 2 (two) times daily.    famotidine (PEPCID) 20 MG tablet Take 1 tablet (20 mg total) by mouth nightly as needed for Heartburn.    warfarin (COUMADIN) 3 MG tablet Take 3 mg by mouth every Mon, Wed, Fri.    [DISCONTINUED] amLODIPine (NORVASC) 5 MG tablet Take 5 mg by mouth once daily.    [DISCONTINUED] dorzolamide (TRUSOPT) 2 % ophthalmic solution 1 drop into affected eye    [DISCONTINUED] latanoprost, PF, 0.005 % Drop 1 drop into affected eye in the evening    [DISCONTINUED] prednisoLONE acetate (PRED FORTE) 1 % DrpS   Instructions: 10 mL, INSTILL 1 DROP INTO RIGHT EYE 4 TIMES DAILY FOR 7 DAYS THEN STOP, 0 Refill(s), Type: Soft Stop    [DISCONTINUED] vit B complex 100 no.2/herbs (VITAMIN B COMPLEX 100  2-HERBS ORAL) Take 1 tablet by mouth once daily.    [DISCONTINUED] warfarin (COUMADIN) 1 MG tablet Take 1.5 mg by mouth every Tuesday, Thursday, Saturday, Sunday.    [DISCONTINUED] warfarin (COUMADIN) 2 MG tablet Take 2 mg by mouth every Mon, Wed, Fri.     Family History       Problem Relation (Age of Onset)    Cancer Father    Heart disease Mother          Tobacco Use    Smoking status: Never    Smokeless tobacco: Never   Substance and Sexual Activity    Alcohol use: No    Drug use: No    Sexual activity: Not Currently     Review of Systems   Constitutional:  Positive for fatigue. Negative for chills and fever.   HENT:  Negative for sore throat.    Eyes:  Negative for redness and itching.   Respiratory:  Positive for shortness of breath. Negative for chest tightness.    Cardiovascular:  Positive for chest pain and palpitations.   Gastrointestinal:  Negative for blood in stool and nausea.   Genitourinary:  Negative for dysuria.   Musculoskeletal:  Negative for gait problem and  joint swelling.   Neurological:  Negative for tremors and weakness.   Psychiatric/Behavioral:  Negative for behavioral problems and sleep disturbance.    All other systems reviewed and are negative.  Objective:     Vital Signs (Most Recent):  Temp: 98 °F (36.7 °C) (03/06/23 0751)  Pulse: 76 (03/06/23 1315)  Resp: (!) 23 (03/06/23 1315)  BP: 127/60 (03/06/23 1315)  SpO2: 100 % (03/06/23 1315)   Vital Signs (24h Range):  Temp:  [98 °F (36.7 °C)] 98 °F (36.7 °C)  Pulse:  [] 76  Resp:  [17-27] 23  SpO2:  [100 %] 100 %  BP: (106-167)/(53-79) 127/60     Weight: 54.4 kg (120 lb)  Body mass index is 19.97 kg/m².    Physical Exam  Vitals and nursing note reviewed.   Constitutional:       Appearance: She is well-developed.      Comments: Chronically ill-appearing   HENT:      Head: Atraumatic.   Eyes:      Pupils: Pupils are equal, round, and reactive to light.   Neck:      Vascular: No JVD.   Cardiovascular:      Rate and Rhythm: Normal rate and regular rhythm.      Heart sounds: Normal heart sounds. No murmur heard.    No gallop.   Pulmonary:      Effort: No respiratory distress.      Breath sounds: Normal breath sounds. No wheezing.   Abdominal:      General: Bowel sounds are normal. There is no distension.      Palpations: Abdomen is soft.      Tenderness: There is no guarding or rebound.   Musculoskeletal:      Cervical back: Neck supple.   Lymphadenopathy:      Cervical: No cervical adenopathy.   Skin:     General: Skin is warm.      Capillary Refill: Capillary refill takes less than 2 seconds.      Findings: No rash.   Neurological:      General: No focal deficit present.      Mental Status: She is alert and oriented to person, place, and time.      Cranial Nerves: No cranial nerve deficit.   Psychiatric:         Behavior: Behavior normal.         CRANIAL NERVES     CN III, IV, VI   Pupils are equal, round, and reactive to light.     Significant Labs: All pertinent labs within the past 24 hours have been  reviewed.    Significant Imaging: I have reviewed all pertinent imaging results/findings within the past 24 hours.

## 2023-03-06 NOTE — HPI
82-year-old lady with numerous medical problems including but not limited to ESRD on hemodialysis, hypertension, AFib on Coumadin, CAD status post PCI sent from dialysis unit due to tachycardia and palpitations.  Upon further asking patient mentioned that she was in her usual state of health until she went to dialysis unit and started feeling short of breath and palpitations.  She was found to have heart rate of more than 180 and was subsequently sent to emergency room.  She endorses some dyspnea on exertion, palpitations and episode of chest tightness during RVR otherwise denies any fever, chills, headache, dizziness, nausea, vomiting, bladder or bowel symptoms.     In ER patient was found to have AFib with RVR and required bolus of IV Cardizem.  Hospital medicine was consulted for admission.

## 2023-03-06 NOTE — ED NOTES
Pt resting quietly. No acute distress noted. Denies any needs at this time. Will continue to monitor. LUNCH TRAY ORDERED

## 2023-03-07 ENCOUNTER — CLINICAL SUPPORT (OUTPATIENT)
Dept: CARDIOLOGY | Facility: HOSPITAL | Age: 83
End: 2023-03-07
Attending: HOSPITALIST
Payer: MEDICARE

## 2023-03-07 VITALS
BODY MASS INDEX: 19.31 KG/M2 | DIASTOLIC BLOOD PRESSURE: 60 MMHG | HEART RATE: 79 BPM | OXYGEN SATURATION: 97 % | TEMPERATURE: 99 F | WEIGHT: 115.88 LBS | HEIGHT: 65 IN | SYSTOLIC BLOOD PRESSURE: 126 MMHG | RESPIRATION RATE: 16 BRPM

## 2023-03-07 PROBLEM — I48.91 ATRIAL FIBRILLATION WITH RVR: Status: RESOLVED | Noted: 2022-12-09 | Resolved: 2023-03-07

## 2023-03-07 LAB
ALBUMIN SERPL BCP-MCNC: 3.4 G/DL (ref 3.5–5.2)
ALP SERPL-CCNC: 67 U/L (ref 55–135)
ALT SERPL W/O P-5'-P-CCNC: 17 U/L (ref 10–44)
ANION GAP SERPL CALC-SCNC: 6 MMOL/L (ref 8–16)
AORTIC ROOT ANNULUS: 2.8 CM
AORTIC VALVE CUSP SEPERATION: 1.2 CM
ASCENDING AORTA: 3.4 CM
AST SERPL-CCNC: 15 U/L (ref 10–40)
AV INDEX (PROSTH): 0.57
AV MEAN GRADIENT: 9 MMHG
AV PEAK GRADIENT: 16 MMHG
AV VALVE AREA: 1.78 CM2
AV VELOCITY RATIO: 0.51
BASOPHILS # BLD AUTO: 0.04 K/UL (ref 0–0.2)
BASOPHILS NFR BLD: 0.7 % (ref 0–1.9)
BILIRUB SERPL-MCNC: 0.7 MG/DL (ref 0.1–1)
BSA FOR ECHO PROCEDURE: 1.58 M2
BUN SERPL-MCNC: 20 MG/DL (ref 8–23)
CALCIUM SERPL-MCNC: 9.1 MG/DL (ref 8.7–10.5)
CHLORIDE SERPL-SCNC: 109 MMOL/L (ref 95–110)
CO2 SERPL-SCNC: 25 MMOL/L (ref 23–29)
CREAT SERPL-MCNC: 5.3 MG/DL (ref 0.5–1.4)
CV ECHO LV RWT: 0.73 CM
DIFFERENTIAL METHOD: ABNORMAL
DOP CALC AO PEAK VEL: 2.01 M/S
DOP CALC AO VTI: 41.7 CM
DOP CALC LVOT AREA: 3.1 CM2
DOP CALC LVOT DIAMETER: 2 CM
DOP CALC LVOT PEAK VEL: 1.03 M/S
DOP CALC LVOT STROKE VOLUME: 74.1 CM3
DOP CALC MV VTI: 58 CM
DOP CALCLVOT PEAK VEL VTI: 23.6 CM
E WAVE DECELERATION TIME: 365 MSEC
E/A RATIO: 1.55
E/E' RATIO: 23.38 M/S
ECHO LV POSTERIOR WALL: 1.4 CM (ref 0.6–1.1)
EJECTION FRACTION: 60 %
EOSINOPHIL # BLD AUTO: 0.8 K/UL (ref 0–0.5)
EOSINOPHIL NFR BLD: 14.5 % (ref 0–8)
ERYTHROCYTE [DISTWIDTH] IN BLOOD BY AUTOMATED COUNT: 14 % (ref 11.5–14.5)
EST. GFR  (NO RACE VARIABLE): 7.6 ML/MIN/1.73 M^2
FRACTIONAL SHORTENING: 28 % (ref 28–44)
GLUCOSE SERPL-MCNC: 87 MG/DL (ref 70–110)
HCT VFR BLD AUTO: 31.1 % (ref 37–48.5)
HGB BLD-MCNC: 9.6 G/DL (ref 12–16)
IMM GRANULOCYTES # BLD AUTO: 0.02 K/UL (ref 0–0.04)
IMM GRANULOCYTES NFR BLD AUTO: 0.4 % (ref 0–0.5)
INR PPP: 3 (ref 0.8–1.2)
INTERVENTRICULAR SEPTUM: 1.81 CM (ref 0.6–1.1)
IVC DIAMETER: 19 CM
LEFT ATRIUM VOLUME INDEX MOD: 35.6 ML/M2
LEFT ATRIUM VOLUME MOD: 55.9 CM3
LEFT INTERNAL DIMENSION IN SYSTOLE: 2.74 CM (ref 2.1–4)
LEFT VENTRICLE DIASTOLIC VOLUME INDEX: 39.94 ML/M2
LEFT VENTRICLE DIASTOLIC VOLUME: 62.7 ML
LEFT VENTRICLE MASS INDEX: 155 G/M2
LEFT VENTRICLE SYSTOLIC VOLUME INDEX: 17.8 ML/M2
LEFT VENTRICLE SYSTOLIC VOLUME: 28 ML
LEFT VENTRICULAR INTERNAL DIMENSION IN DIASTOLE: 3.82 CM (ref 3.5–6)
LEFT VENTRICULAR MASS: 243.28 G
LV LATERAL E/E' RATIO: 20.78 M/S
LV SEPTAL E/E' RATIO: 26.71 M/S
LVOT MG: 2 MMHG
LVOT MV: 0.69 CM/S
LYMPHOCYTES # BLD AUTO: 1 K/UL (ref 1–4.8)
LYMPHOCYTES NFR BLD: 17.1 % (ref 18–48)
MAGNESIUM SERPL-MCNC: 2 MG/DL (ref 1.6–2.6)
MCH RBC QN AUTO: 30.9 PG (ref 27–31)
MCHC RBC AUTO-ENTMCNC: 30.9 G/DL (ref 32–36)
MCV RBC AUTO: 100 FL (ref 82–98)
MONOCYTES # BLD AUTO: 0.8 K/UL (ref 0.3–1)
MONOCYTES NFR BLD: 13.5 % (ref 4–15)
MV MEAN GRADIENT: 6 MMHG
MV PEAK A VEL: 1.21 M/S
MV PEAK E VEL: 1.87 M/S
MV PEAK GRADIENT: 18 MMHG
MV STENOSIS PRESSURE HALF TIME: 115 MS
MV VALVE AREA BY CONTINUITY EQUATION: 1.28 CM2
MV VALVE AREA P 1/2 METHOD: 1.91 CM2
NEUTROPHILS # BLD AUTO: 3 K/UL (ref 1.8–7.7)
NEUTROPHILS NFR BLD: 53.8 % (ref 38–73)
NRBC BLD-RTO: 0 /100 WBC
PHOSPHATE SERPL-MCNC: 3.9 MG/DL (ref 2.7–4.5)
PISA TR MAX VEL: 3 M/S
PLATELET # BLD AUTO: 294 K/UL (ref 150–450)
PMV BLD AUTO: 9 FL (ref 9.2–12.9)
POTASSIUM SERPL-SCNC: 5.2 MMOL/L (ref 3.5–5.1)
PROT SERPL-MCNC: 6.4 G/DL (ref 6–8.4)
PROTHROMBIN TIME: 29.8 SEC (ref 9–12.5)
PV MV: 0.82 M/S
PV PEAK VELOCITY: 1.15 CM/S
RBC # BLD AUTO: 3.11 M/UL (ref 4–5.4)
RV MID DIAMA: 23.4 CM
RV TISSUE DOPPLER FREE WALL SYSTOLIC VELOCITY 1 (APICAL 4 CHAMBER VIEW): 0.02 CM/S
SODIUM SERPL-SCNC: 140 MMOL/L (ref 136–145)
TDI LATERAL: 0.09 M/S
TDI SEPTAL: 0.07 M/S
TDI: 0.08 M/S
TR MAX PG: 36 MMHG
TRICUSPID ANNULAR PLANE SYSTOLIC EXCURSION: 2.66 CM
WBC # BLD AUTO: 5.57 K/UL (ref 3.9–12.7)

## 2023-03-07 PROCEDURE — 83735 ASSAY OF MAGNESIUM: CPT | Performed by: HOSPITALIST

## 2023-03-07 PROCEDURE — 84100 ASSAY OF PHOSPHORUS: CPT | Performed by: HOSPITALIST

## 2023-03-07 PROCEDURE — 25000003 PHARM REV CODE 250: Performed by: INTERNAL MEDICINE

## 2023-03-07 PROCEDURE — 93306 TTE W/DOPPLER COMPLETE: CPT | Mod: 26,,, | Performed by: INTERNAL MEDICINE

## 2023-03-07 PROCEDURE — 93306 ECHO (CUPID ONLY): ICD-10-PCS | Mod: 26,,, | Performed by: INTERNAL MEDICINE

## 2023-03-07 PROCEDURE — 99223 PR INITIAL HOSPITAL CARE,LEVL III: ICD-10-PCS | Mod: ,,, | Performed by: INTERNAL MEDICINE

## 2023-03-07 PROCEDURE — 36415 COLL VENOUS BLD VENIPUNCTURE: CPT | Performed by: HOSPITALIST

## 2023-03-07 PROCEDURE — 99223 1ST HOSP IP/OBS HIGH 75: CPT | Mod: ,,, | Performed by: INTERNAL MEDICINE

## 2023-03-07 PROCEDURE — 85025 COMPLETE CBC W/AUTO DIFF WBC: CPT | Performed by: HOSPITALIST

## 2023-03-07 PROCEDURE — 93306 TTE W/DOPPLER COMPLETE: CPT

## 2023-03-07 PROCEDURE — 85610 PROTHROMBIN TIME: CPT | Performed by: HOSPITALIST

## 2023-03-07 PROCEDURE — 25000003 PHARM REV CODE 250: Performed by: NURSE PRACTITIONER

## 2023-03-07 PROCEDURE — G0378 HOSPITAL OBSERVATION PER HR: HCPCS

## 2023-03-07 PROCEDURE — 80053 COMPREHEN METABOLIC PANEL: CPT | Performed by: HOSPITALIST

## 2023-03-07 PROCEDURE — 25000003 PHARM REV CODE 250: Performed by: HOSPITALIST

## 2023-03-07 RX ORDER — DILTIAZEM HYDROCHLORIDE 120 MG/1
120 CAPSULE, COATED, EXTENDED RELEASE ORAL DAILY
Status: ON HOLD
Start: 2023-03-07 | End: 2023-06-29 | Stop reason: SDUPTHER

## 2023-03-07 RX ORDER — LIDOCAINE AND PRILOCAINE 25; 25 MG/G; MG/G
CREAM TOPICAL
Status: DISCONTINUED | OUTPATIENT
Start: 2023-03-07 | End: 2023-03-07 | Stop reason: HOSPADM

## 2023-03-07 RX ORDER — AMIODARONE HYDROCHLORIDE 200 MG/1
TABLET ORAL
Qty: 67 TABLET | Refills: 0 | Status: ON HOLD | OUTPATIENT
Start: 2023-03-07 | End: 2023-03-21 | Stop reason: HOSPADM

## 2023-03-07 RX ORDER — AMIODARONE HYDROCHLORIDE 200 MG/1
200 TABLET ORAL 2 TIMES DAILY
Status: DISCONTINUED | OUTPATIENT
Start: 2023-03-07 | End: 2023-03-07 | Stop reason: HOSPADM

## 2023-03-07 RX ORDER — DILTIAZEM HYDROCHLORIDE 120 MG/1
120 CAPSULE, COATED, EXTENDED RELEASE ORAL DAILY
Status: DISCONTINUED | OUTPATIENT
Start: 2023-03-08 | End: 2023-03-07 | Stop reason: HOSPADM

## 2023-03-07 RX ADMIN — MUPIROCIN 1 G: 20 OINTMENT TOPICAL at 10:03

## 2023-03-07 RX ADMIN — DORZOLAMIDE HYDROCHLORIDE AND TIMOLOL MALEATE 1 DROP: 22.3; 6.8 SOLUTION/ DROPS OPHTHALMIC at 10:03

## 2023-03-07 RX ADMIN — DILTIAZEM HYDROCHLORIDE 120 MG: 120 CAPSULE, COATED, EXTENDED RELEASE ORAL at 10:03

## 2023-03-07 RX ADMIN — CALCIUM ACETATE 667 MG: 667 CAPSULE ORAL at 12:03

## 2023-03-07 RX ADMIN — AMIODARONE HYDROCHLORIDE 200 MG: 200 TABLET ORAL at 10:03

## 2023-03-07 RX ADMIN — ATORVASTATIN CALCIUM 40 MG: 40 TABLET, FILM COATED ORAL at 10:03

## 2023-03-07 RX ADMIN — CALCIUM ACETATE 667 MG: 667 CAPSULE ORAL at 07:03

## 2023-03-07 NOTE — CONSULTS
[facility]  Department of Cardiology  Consult Note      PATIENT NAME: Tyra Isaac    MRN: [id number]  TODAY'S DATE: 2023  ADMIT DATE: 3/6/2023                          CONSULT REQUESTED BY: Gladis De Leon MD    SUBJECTIVE     PRINCIPAL PROBLEM: Atrial fibrillation with RVR      REASON FOR CONSULT:    From H&P: 82-year-old lady with numerous medical problems including but not limited to ESRD on hemodialysis, hypertension, AFib on Coumadin, CAD status post PCI sent from dialysis unit due to tachycardia and palpitations.  Upon further asking patient mentioned that she was in her usual state of health until she went to dialysis unit and started feeling short of breath and palpitations.  She was found to have heart rate of more than 180 and was subsequently sent to emergency room.  She endorses some dyspnea on exertion, palpitations and episode of chest tightness during RVR otherwise denies any fever, chills, headache, dizziness, nausea, vomiting, bladder or bowel symptoms.      In ER patient was found to have AFib with RVR and required bolus of IV Cardizem.  Hospital medicine was consulted for admission.         HPI:    Ms. Isaac is an 82 year old female who presented to the ER with Afib with RVR. Her HR apparently spiked to 180 bpm during dialysis yesterday. She has a hx of PAF and is on coumadin. She also has hx of CAD with PCI, ESRD on HD, HTN. Patient converted back to SR shortly after arriving to the ER. She has maintained SR overnight. Patient's home med list reflect amiodarone, but she is unsure if she has been taking it. It appears to be .       Review of patient's allergies indicates:   Allergen Reactions    Cyclobenzaprine     Fish containing products Hives    Peanut Other (See Comments)    Tramadol Itching       Past Medical History:   Diagnosis Date    A-fib     Anxiety     Depression     Disorder of kidney and ureter     Encephalopathy acute 2018    End stage  kidney disease 6/17/2017    Gout     Hyperlipidemia     Hypertension     Moderate episode of recurrent major depressive disorder 1/17/2018    Nephropathy hypertensive, 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    Osteopenia of multiple sites 3/9/2018    Based upon bone density measurements. Patient also has chronic kidney disease.    Stroke 11/2016     Past Surgical History:   Procedure Laterality Date    ANGIOGRAM, CORONARY, WITH LEFT HEART CATHETERIZATION N/A 2/7/2022    Procedure: Angiogram, Coronary, with Left Heart Cath;  Surgeon: Gino Leal MD;  Location: [facility] CATH/EP LAB;  Service: Cardiology;  Laterality: N/A;    CARDIAC SURGERY      stents    EYE SURGERY      WRIST SURGERY       Social History     Tobacco Use    Smoking status: Never    Smokeless tobacco: Never   Substance Use Topics    Alcohol use: No    Drug use: No        REVIEW OF SYSTEMS  CONSTITUTIONAL: Negative for chills, fatigue and fever.   EYES: No double vision, No blurred vision  NEURO: No headaches, No dizziness  RESPIRATORY: Negative for cough, shortness of breath and wheezing.    CARDIOVASCULAR: Negative for chest pain. Negative for palpitations and leg swelling.   GI: Negative for abdominal pain, No melena, diarrhea, nausea and vomiting.   : Negative for dysuria and frequency, Negative for hematuria  SKIN: Negative for bruising, Negative for edema or discoloration noted.   PSYCHIATRIC: Negative for depression, Negative for anxiety, Negative for memory loss  MUSCULOSKELETAL: Negative for neck pain, Negative for muscle weakness, Negative for back pain     OBJECTIVE     VITAL SIGNS (Most Recent)  Temp: 98.1 °F (36.7 °C) (03/07/23 0842)  Pulse: 74 (03/07/23 0842)  Resp: 16 (03/07/23 0842)  BP: (!) 132/58 (03/07/23 0842)  SpO2: 100 % (03/07/23 0842)    VENTILATION STATUS  Resp: 16 (03/07/23 0842)  SpO2: 100 %  (03/07/23 0842)       I & O (Last 24H):  Intake/Output Summary (Last 24 hours) at 3/7/2023 0917  Last data filed at 3/7/2023 0841  Gross per 24 hour   Intake 740 ml   Output 1724 ml   Net -984 ml       WEIGHTS  Wt Readings from Last 1 Encounters:   03/07/23 0400 52.6 kg (115 lb 14.4 oz)   03/07/23 0300 52.6 kg (115 lb 14.4 oz)   03/06/23 0751 54.4 kg (120 lb)       PHYSICAL EXAM  CONSTITUTIONAL: No fever, no chills  HEENT: Normocephalic, atraumatic,pupils reactive to light                 NECK:  No JVD no carotid bruit  CVS: S1S2+, RRR  LUNGS: Clear  ABDOMEN: Soft, NT, BS+  EXTREMITIES: No cyanosis, edema  : No diaz catheter  NEURO: AAO X 3  PSY: Normal affect      HOME MEDICATIONS:  No current facility-administered medications on file prior to encounter.     Current Outpatient Medications on File Prior to Encounter   Medication Sig Dispense Refill    atorvastatin (LIPITOR) 40 MG tablet Take 40 mg by mouth once daily.      b complex vitamins tablet Take 1 tablet by mouth once daily.      calcium acetate,phosphat bind, (PHOSLO) 667 mg tablet Take 667 mg by mouth 2 (two) times daily with meals.      diltiaZEM (CARDIZEM CD) 120 MG Cp24 Take 120 mg by mouth 2 (two) times daily.      docusate sodium (COLACE) 100 MG capsule Take 100 mg by mouth 3 (three) times a week.      dorzolamide-timolol 2-0.5% (COSOPT) 22.3-6.8 mg/mL ophthalmic solution Place 1 drop into both eyes 2 (two) times daily.       latanoprost 0.005 % ophthalmic solution Place 1 drop into both eyes every evening.       lidocaine 5 % Crea Apply 1 application topically every Mon, Wed, Fri.       warfarin (COUMADIN) 3 MG tablet Take 1.5 mg by mouth every Tuesday, Thursday, Saturday, Sunday.      amiodarone (PACERONE) 200 MG Tab Take 1 tablet (200 mg total) by mouth 2 (two) times daily. 60 tablet 0    famotidine (PEPCID) 20 MG tablet Take 1 tablet (20 mg total) by mouth nightly as needed for Heartburn. 30 tablet 5    warfarin (COUMADIN) 3 MG tablet Take 3 mg  by mouth every Mon, Wed, Fri.         SCHEDULED MEDS:   atorvastatin  40 mg Oral Daily    calcium acetate(phosphat bind)  667 mg Oral TID WM    diltiaZEM  120 mg Oral BID    docusate sodium  100 mg Oral Once per day on Mon Wed Fri    dorzolamide-timolol 2-0.5%  1 drop Both Eyes BID    latanoprost  1 drop Both Eyes QHS    mupirocin   Nasal BID    polyethylene glycol  17 g Oral BID       CONTINUOUS INFUSIONS:    PRN MEDS:acetaminophen, acetaminophen, famotidine, glucagon (human recombinant), glucose, glucose, magnesium oxide, magnesium oxide, melatonin, naloxone, ondansetron, potassium bicarbonate, potassium bicarbonate, potassium bicarbonate, potassium, sodium phosphates, potassium, sodium phosphates, potassium, sodium phosphates, simethicone, sodium chloride 0.9%    LABS AND DIAGNOSTICS     CBC LAST 3 DAYS  Recent Labs   Lab 03/06/23 0759 03/07/23 0348   WBC 6.23 5.57   RBC 3.27* 3.11*   HGB 9.9* 9.6*   HCT 32.5* 31.1*   MCV 99* 100*   MCH 30.3 30.9   MCHC 30.5* 30.9*   RDW 13.9 14.0    294   MPV 9.1* 9.0*   GRAN 54.7  3.4 53.8  3.0   LYMPH 18.5  1.2 17.1*  1.0   MONO 10.8  0.7 13.5  0.8   BASO 0.04 0.04   NRBC 0 0       COAGULATION LAST 3 DAYS  Recent Labs   Lab 03/06/23  0759 03/07/23  0348   INR 3.4* 3.0*       CHEMISTRY LAST 3 DAYS  Recent Labs   Lab 03/06/23  0759 03/07/23  0348    140   K 4.1 5.2*    109   CO2 28 25   ANIONGAP 14 6*   BUN 38* 20   CREATININE 8.5* 5.3*   * 87   CALCIUM 9.2 9.1   MG 2.0 2.0   ALBUMIN 3.5 3.4*   PROT 6.9 6.4   ALKPHOS 62 67   ALT 16 17   AST 20 15   BILITOT 0.8 0.7       CARDIAC PROFILE LAST 3 DAYS  Recent Labs   Lab 03/06/23  0759 03/06/23  1935   *  --    TROPONINIHS 51.3* 62.4*       ENDOCRINE LAST 3 DAYS  No results for input(s): TSH, PROCAL in the last 168 hours.    LAST ARTERIAL BLOOD GAS  ABG  No results for input(s): PH, PO2, PCO2, HCO3, BE in the last 168 hours.    LAST 7 DAYS MICROBIOLOGY   Microbiology Results (last 7 days)        ** No results found for the last 168 hours. **            MOST RECENT IMAGING  X-Ray Chest AP Portable  XR CHEST 1 VIEW    CLINICAL HISTORY:  82 years Female palpitations    COMPARISON: February 24, 2023    FINDINGS: Cardiac silhouette size is within normal limits for portable technique. Lordotic positioning. No confluent airspace disease. No interstitial edema. No large pleural effusion or pneumothorax. No acute osseous abnormality.    IMPRESSION:    No acute pulmonary pathology.    Electronically signed by:  Aleksandar Castillo MD  3/6/2023 8:22 AM [facility] Workstation: [id number]      ECHOCARDIOGRAM RESULTS (last 5)  Results for orders placed during the hospital encounter of 01/26/22    Echo    Interpretation Summary  · The left ventricle is normal in size with mild concentric hypertrophy and normal systolic function.  · The estimated ejection fraction is 65%.  · Grade II left ventricular diastolic dysfunction.  · Atrial fibrillation not observed.  · Normal right ventricular size with normal right ventricular systolic function.  · Moderate to severe left atrial enlargement.  · Mild right atrial enlargement.  · Mild mitral regurgitation.  · Mild to moderate tricuspid regurgitation.  · Normal central venous pressure (3 mmHg).  · The estimated PA systolic pressure is 36 mmHg.  · Mildly elevated gradient accross mitral valve of around 6 mmHg at HR of 64 bpm. MVA by pressure half time is normal, however this can be inaccurate.      Results for orders placed during the hospital encounter of 03/03/21    Echo Color Flow Doppler? Yes    Interpretation Summary  · The left ventricle is small with concentric remodeling and normal systolic function. The estimated ejection fraction is 63%  · The estimated PA systolic pressure is 38 mmHg.  · Grade II left ventricular diastolic dysfunction.  · Normal right ventricular size with normal right ventricular systolic function.  · Mild-to-moderate mitral regurgitation.  · Mild to moderate  tricuspid regurgitation.  · Normal central venous pressure (3 mmHg).  · The patient converted from atrial fibrillation to normal sinus rhythm 03/03/2021      Results for orders placed during the hospital encounter of 12/13/19    Echo Color Flow Doppler? Yes    Interpretation Summary  · Mild concentric left ventricular hypertrophy.  · Normal left ventricular systolic function. The estimated ejection fraction is 70%  · Grade II (moderate) left ventricular diastolic dysfunction consistent with pseudonormalization.  · The estimated PA systolic pressure is 37 mm Hg  · No wall motion abnormalities.  · Normal right ventricular systolic function.  · Normal central venous pressure (3 mm Hg).  · Mild mitral sclerosis.  · Mild mitral regurgitation.  · Mild to moderate tricuspid regurgitation.      CURRENT/PREVIOUS VISIT EKG  Results for orders placed or performed during the hospital encounter of 03/06/23   EKG 12-lead    Collection Time: 03/06/23  7:52 AM    Narrative    Test Reason : R07.9,    Vent. Rate : 153 BPM     Atrial Rate : 174 BPM     P-R Int : 000 ms          QRS Dur : 076 ms      QT Int : 270 ms       P-R-T Axes : 000 -13 134 degrees     QTc Int : 431 ms    Atrial fibrillation with rapid ventricular response  ST and T wave abnormality, consider lateral ischemia  Abnormal ECG  When compared with ECG of 24-FEB-2023 11:43,  Atrial fibrillation has replaced Sinus rhythm  Vent. rate has increased BY  68 BPM  Minimal criteria for Anteroseptal infarct are no longer Present  Nonspecific T wave abnormality no longer evident in Inferior leads  T wave inversion no longer evident in Anterior leads    Referred By:             Confirmed By:            ASSESSMENT/PLAN:     Active Hospital Problems    Diagnosis    *Atrial fibrillation with RVR    CAD s/p PCI    Anticoagulated on Coumadin    End stage renal disease on dialysis    ESRD on HD via are right upper extremity AVF MWF    Long term (current) use of anticoagulants    Benign  hypertension with ESRD (end-stage renal disease)     BP slightly elevated1.21.15 BP elelvated. Took meds in [facility] will increase amlodipine to 10 mg per day. New prescription sent to the pharmacy. She will double the existing prescription. She will continue to monitor her blood pressures.         ASSESSMENT & PLAN:     Afib with RVR- back in SR  Anticoagulated on warfarin  History of syncope  Hx of bradycardia  CAD  HTN  ESRD on HD      RECOMMENDATIONS:    2D echo pending.   She is back in SR. She has had several episodes of Afib with RVR over the past few years. She did also at some point have bradycardia and syncope. Can cautiously resume amiodarone at 200 mg pO BID and refer to EP for evaluation.   She has been on cardizem  mg po BID. Decrease to daily dosing.  Fluid and electrolyte management per renal.   Patient may benefit from EP evaluation.   Hopeful for dc home soon. Follow up with primary cardiologist outpatient.         Jacquie Carter NP  [facility]  Department of Cardiology  Date of Service: 03/07/2023  9:17 AM     I have personally interviewed and examined the patient. I have reviewed the Nurse Practitioner's history and physical, assessment, and plan. I agree with the findings and made appropriate changes as necessary in recommendations.    1. Patient is currently on amiodarone it was restarted.  Will decrease the dosage of Cardizem CD.  2. Discussed with patient that she would need further evaluation with the electrophysiologist for possible atrial fibrillation ablation.      Gino Leal MD  Department of Cardiology  [facility]  03/07/2023 9:29 AM

## 2023-03-07 NOTE — PLAN OF CARE
Problem: Adult Inpatient Plan of Care  Goal: Plan of Care Review  Outcome: Ongoing, Not Progressing  Goal: Patient-Specific Goal (Individualized)  Outcome: Ongoing, Not Progressing  Goal: Absence of Hospital-Acquired Illness or Injury  Outcome: Ongoing, Not Progressing  Goal: Optimal Comfort and Wellbeing  Outcome: Ongoing, Not Progressing  Goal: Readiness for Transition of Care  Outcome: Ongoing, Not Progressing     Problem: Device-Related Complication Risk (Hemodialysis)  Goal: Safe, Effective Therapy Delivery  Outcome: Ongoing, Not Progressing     Problem: Hemodynamic Instability (Hemodialysis)  Goal: Effective Tissue Perfusion  Outcome: Ongoing, Not Progressing     Problem: Infection (Hemodialysis)  Goal: Absence of Infection Signs and Symptoms  Outcome: Ongoing, Not Progressing

## 2023-03-07 NOTE — PLAN OF CARE
[facility]  Discharge Final Note    Primary Care Provider: Kalpesh Goel MD    Expected Discharge Date: 3/7/2023     met with Pt at bedside to confirm discharge plans. Pt verbalized plan to discharge home via family transport.  called Pt's son (Raffi Isaac (Son) [phone] (Mobile)) to confirm discharge plan. Pt's son confirm discharge plan.  sent email to social  to schedule Pt's follow-up appointment. Pt has no other needs to be addressed by case management. Pt cleared to discharge by case management.    Final Discharge Note (most recent)       Final Note - 03/07/23 1534          Final Note    Assessment Type Final Discharge Note     Anticipated Discharge Disposition Home or Self Care     What phone number can be called within the next 1-3 days to see how you are doing after discharge? [phone]     Hospital Resources/Appts/Education Provided Appointments scheduled by Navigator/Coordinator;Community resources provided        Post-Acute Status    Coverage Payor:  HUMAN MANAGED MEDICARE - [employer] MEDICARE HMO     Discharge Delays None known at this time                     Important Message from Medicare             Contact Info       Kalpesh Goel MD   Specialty: Internal Medicine   Relationship: PCP - General    [address]  [address]  [facility] [address]   Phone: [phone]       Next Steps: Follow up in 2 week(s)

## 2023-03-07 NOTE — PROGRESS NOTES
INPATIENT NEPHROLOGY CONSULT   [facility] NE[facility]    Saimahattie Isaac  03/07/2023    Reason for consultation:    esrd    Chief Complaint:   Chief Complaint   Patient presents with    Palpitations     Patient found to be tachycardiac when being hooked up in dialysis this AM           History of Present Illness:    Per ED  Patient presents emergency department via EMS with reported elevated heart rate noted at dialysis patient presented to dialysis this morning I have found have a heart rate in the 180s EMS was contacted no dialysis was initiated patient reports that she is chronic clear short of breath did not seem to be any worse than baseline she did not notice palpitations she does have a history of atrial fibrillation she denies any recent changes in her medications for last dialysis was Friday she was able to sit for the entire session without difficulty patient reports that she felt at baseline over the weekend did not know anything was wrong until they told her something wrong dialysis she sees Dr. Maynard      3/6  No nausea, chest pain, sob, fever, urinary or bowel complaint, new neurologic symptoms, new joint pain    3/7  tolerated hd for uremic clearance and uf.  No nausea, chest pain, sob, fever, urinary or bowel complaint, new neurologic symptoms, new joint pain      Plan of Care:       Assessment:    esrd  --dialysis today.  Back in sinus rhythm    --fluid restrict  --renal dose medication per routine  --continue outpt medication  --continue binders with meals    Anemia  --erythropoiesis stimulating agent with renal replacement therapy    H/o Hyperphosphatemia  --renal diet  --continue binders    Hypertension  --uf with hd  --fluid restrict  --low salt diet  --continue home medication    Secondary hyperparathyroidism  --continue activated vit d at outpatient dialysis unit per protocol  --low phos diet    Afib RVR  -back in sinus rhythm.   Amiodarone started    Hyperkalemia  --low k diet  --2 K  dialysate         Thank you for allowing us to participate in this patient's care. We will continue to follow.    Vital Signs:  Temp Readings from Last 3 Encounters:   03/07/23 98.5 °F (36.9 °C) (Oral)   02/25/23 98 °F (36.7 °C) (Oral)   12/10/22 100 °F (37.8 °C) (Oral)       Pulse Readings from Last 3 Encounters:   03/07/23 79   02/25/23 82   12/10/22 74       BP Readings from Last 3 Encounters:   03/07/23 126/60   02/25/23 (!) 118/59   12/10/22 116/61       Weight:  Wt Readings from Last 3 Encounters:   03/07/23 52.6 kg (115 lb 14.4 oz)   02/24/23 54.4 kg (119 lb 14.9 oz)   12/10/22 52.7 kg (116 lb 2.9 oz)       Past Medical & Surgical History:  Past Medical History:   Diagnosis Date    A-fib     Anxiety     Depression     Disorder of kidney and ureter     Encephalopathy acute 1/1/2018    End stage kidney disease 6/17/2017    Gout     Hyperlipidemia     Hypertension     Moderate episode of recurrent major depressive disorder 1/17/2018    Nephropathy hypertensive, 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    Osteopenia of multiple sites 3/9/2018    Based upon bone density measurements. Patient also has chronic kidney disease.    Stroke 11/2016       Past Surgical History:   Procedure Laterality Date    ANGIOGRAM, CORONARY, WITH LEFT HEART CATHETERIZATION N/A 2/7/2022    Procedure: Angiogram, Coronary, with Left Heart Cath;  Surgeon: Gino Leal MD;  Location: [facility] CATH/EP LAB;  Service: Cardiology;  Laterality: N/A;    CARDIAC SURGERY      stents    EYE SURGERY      WRIST SURGERY         Past Social History:  Social History     Socioeconomic History    Marital status:      Spouse name: Aria Isaac    Number of children: 3   Occupational History    Occupation: Not working   Tobacco Use    Smoking status: Never    Smokeless tobacco: Never   Substance and Sexual Activity     Alcohol use: No    Drug use: No    Sexual activity: Not Currently     Social Determinants of Health     Financial Resource Strain: Medium Risk    Difficulty of Paying Living Expenses: Somewhat hard   Food Insecurity: No Food Insecurity    Worried About Running Out of Food in the Last Year: Never true    Ran Out of Food in the Last Year: Never true   Transportation Needs: No Transportation Needs    Lack of Transportation (Medical): No    Lack of Transportation (Non-Medical): No   Physical Activity: Inactive    Days of Exercise per Week: 0 days    Minutes of Exercise per Session: 0 min   Stress: Stress Concern Present    Feeling of Stress : To some extent   Social Connections: Moderately Isolated    Frequency of Communication with Friends and Family: Three times a week    Frequency of Social Gatherings with Friends and Family: Three times a week    Attends Buddhist Services: 1 to 4 times per year    Active Member of Clubs or Organizations: No    Attends Club or Organization Meetings: Never    Marital Status:    Housing Stability: Low Risk     Unable to Pay for Housing in the Last Year: No    Number of Places Lived in the Last Year: 1    Unstable Housing in the Last Year: No       Medications:  No current facility-administered medications on file prior to encounter.     Current Outpatient Medications on File Prior to Encounter   Medication Sig Dispense Refill    atorvastatin (LIPITOR) 40 MG tablet Take 40 mg by mouth once daily.      b complex vitamins tablet Take 1 tablet by mouth once daily.      calcium acetate,phosphat bind, (PHOSLO) 667 mg tablet Take 667 mg by mouth 2 (two) times daily with meals.      diltiaZEM (CARDIZEM CD) 120 MG Cp24 Take 120 mg by mouth 2 (two) times daily.      docusate sodium (COLACE) 100 MG capsule Take 100 mg by mouth 3 (three) times a week.      dorzolamide-timolol 2-0.5% (COSOPT) 22.3-6.8 mg/mL ophthalmic solution Place 1 drop into both eyes 2 (two) times daily.        "latanoprost 0.005 % ophthalmic solution Place 1 drop into both eyes every evening.       lidocaine 5 % Crea Apply 1 application topically every Mon, Wed, Fri.       warfarin (COUMADIN) 3 MG tablet Take 1.5 mg by mouth every Tuesday, Thursday, Saturday, Sunday.      amiodarone (PACERONE) 200 MG Tab Take 1 tablet (200 mg total) by mouth 2 (two) times daily. 60 tablet 0    famotidine (PEPCID) 20 MG tablet Take 1 tablet (20 mg total) by mouth nightly as needed for Heartburn. 30 tablet 5    warfarin (COUMADIN) 3 MG tablet Take 3 mg by mouth every Mon, Wed, Fri.       Scheduled Meds:   amiodarone  200 mg Oral BID    atorvastatin  40 mg Oral Daily    calcium acetate(phosphat bind)  667 mg Oral TID WM    diltiaZEM  120 mg Oral BID    docusate sodium  100 mg Oral Once per day on Mon Wed Fri    dorzolamide-timolol 2-0.5%  1 drop Both Eyes BID    latanoprost  1 drop Both Eyes QHS    mupirocin   Nasal BID    polyethylene glycol  17 g Oral BID     Continuous Infusions:      PRN Meds:.    Allergies:  Cyclobenzaprine, Fish containing products, Peanut, and Tramadol    Past Family History:  Reviewed; refer to Hospitalist Admission Note    Review of Systems:  Review of Systems - All 14 systems reviewed and negative, except as noted in HPI    Physical Exam:    /60 (BP Location: Left arm, Patient Position: Lying)   Pulse 79   Temp 98.5 °F (36.9 °C) (Oral)   Resp 16   Ht 5' 5" (1.651 m)   Wt 52.6 kg (115 lb 14.4 oz)   SpO2 97%   BMI 19.29 kg/m²     General Appearance:    Alert, cooperative, no distress, appears stated age   Head:    Normocephalic, without obvious abnormality, atraumatic   Eyes:    PER, conjunctiva/corneas clear, EOM's intact in both eyes        Throat:   Lips, mucosa, and tongue normal; teeth and gums normal   Back:     Symmetric, no curvature, ROM normal, no CVA tenderness   Lungs:     Clear to auscultation bilaterally, respirations unlabored   Chest wall:    No tenderness or deformity   Heart:    RRR no " rub   Abdomen:     Soft, non-tender, bowel sounds active all four quadrants,     no masses, no organomegaly   Extremities:   Extremities normal, atraumatic, no cyanosis or edema   Pulses:   2+ and symmetric all extremities   MSK:   No joint or muscle swelling, tenderness or deformity   Skin:   Skin color, texture, turgor normal, no rashes or lesions   Neurologic:   CNII-XII intact, normal strength and sensation       Throughout.  No flap     Results:  Lab Results   Component Value Date     03/07/2023    K 5.2 (H) 03/07/2023     03/07/2023    CO2 25 03/07/2023    BUN 20 03/07/2023    CREATININE 5.3 (H) 03/07/2023    CALCIUM 9.1 03/07/2023    ANIONGAP 6 (L) 03/07/2023    ESTGFRAFRICA 4.7 (A) 02/08/2022    EGFRNONAA 4.0 (A) 02/08/2022       Lab Results   Component Value Date    CALCIUM 9.1 03/07/2023    PHOS 3.9 03/07/2023       Recent Labs   Lab 03/07/23  0348   WBC 5.57   RBC 3.11*   HGB 9.6*   HCT 31.1*      *   MCH 30.9   MCHC 30.9*            I have personally reviewed pertinent radiological imaging and reports.    Patient care was time spent personally by me on the following activities:   Obtaining a history  Examination of patient.  Providing medical care at the patients bedside.  Developing a treatment plan with patient or surrogate and bedside caregivers  Ordering and reviewing laboratory studies, radiographic studies, pulse oximetry.  Ordering and performing treatments and interventions.  Evaluation of patient's response to treatment.  Discussions with consultants while on the unit and immediately available to the patient.  Re-evaluation of the patient's condition.  Documentation in the medical record.     Jimenez Valero MD  Nephrology  [facility]  [phone]

## 2023-03-07 NOTE — DISCHARGE SUMMARY
[facility]  Discharge Summary      Patient Name: Tyra Isaac  MRN: [id number]  [facility]: [id number]  Patient Class: OP- Observation  Admission Date: 3/6/2023  Hospital Length of Stay: 0 days  Discharge Date and Time: 3/7/2023  4:10 PM  Attending Physician: No att. providers found   Discharging Provider: Gladis De Leon MD  Primary Care Provider: Kalpesh Goel MD    Primary Care Team: Networked reference to record PCT     HPI:   82-year-old lady with numerous medical problems including but not limited to ESRD on hemodialysis, hypertension, AFib on Coumadin, CAD status post PCI sent from dialysis unit due to tachycardia and palpitations.  Upon further asking patient mentioned that she was in her usual state of health until she went to dialysis unit and started feeling short of breath and palpitations.  She was found to have heart rate of more than 180 and was subsequently sent to emergency room.  She endorses some dyspnea on exertion, palpitations and episode of chest tightness during RVR otherwise denies any fever, chills, headache, dizziness, nausea, vomiting, bladder or bowel symptoms.     In ER patient was found to have AFib with RVR and required bolus of IV Cardizem.  Hospital medicine was consulted for admission.      * No surgery found *      Hospital Course:   82-year-old lady with numerous medical problems including but not limited to ESRD on hemodialysis, hypertension, AFib on Coumadin, CAD status post PCI sent from dialysis unit due to tachycardia and palpitations.  She was found to have AFib with RVR which quickly resolved with 1 push of 10 mg IV Cardizem and brief Cardizem drip.  Since arrival to her inpatient bed she remained in sinus rhythm.  She was evaluated by Cardiology who decided to put her back on amiodarone and cut down her Cardizem from b.i.d. to daily dose.  She is already on Coumadin and has therapeutic INR. 2D echo was performed.  I also generated if he  referral at the request of Cardiology team.  Upon my interview she denies any new symptoms, she was in great spirit and asymptomatic.     I have seen the patient on the day of discharge and reviewed the discharge instructions as outlined below. Patient verbalized understanding and is aware to contact primary care physician or return to ED if new or worsening symptoms.        Goals of Care Treatment Preferences:  Code Status: Full Code      Consults:   Consults (From admission, onward)        Status Ordering Provider     Inpatient consult to Cardiology  Once        Provider:  Gino Leal MD    Completed KIMMY DE LEONKRTAMMY     Inpatient consult to Nephrology  Once        Provider:  Imtiaz Sainz MD    Acknowledged GLADIS DE LEON     Inpatient consult to Hospitalist  Once        Provider:  Gladis De Leon MD    Acknowledged GLADIS DE LEON     Inpatient consult to Nephrology  Once        Provider:  Imtiaz Sainz MD    Completed GLADIS DE LEON          No new Assessment & Plan notes have been filed under this hospital service since the last note was generated.  Service: Hospital Medicine    Final Active Diagnoses:    Diagnosis Date Noted POA    CAD s/p PCI [I25.10] 12/09/2022 Yes     Chronic    Anticoagulated on Coumadin [Z79.01] 12/09/2022 Not Applicable     Chronic    End stage renal disease on dialysis [N18.6, Z99.2] 06/25/2021 Not Applicable    ESRD on HD via are right upper extremity AVF MWF [N18.6] 12/13/2019 Yes     Chronic    Long term (current) use of anticoagulants [Z79.01] 12/13/2019 Not Applicable    Paroxysmal atrial fibrillation [I48.0] 03/28/2018 Yes    Benign hypertension with ESRD (end-stage renal disease) [I12.0, N18.6] 12/20/2017 Yes      Problems Resolved During this Admission:    Diagnosis Date Noted Date Resolved POA    PRINCIPAL PROBLEM:  Atrial fibrillation with RVR [I48.91] 12/09/2022 03/07/2023 Yes       Discharged Condition: good    Disposition: Home or Self  Care    Follow Up:   Follow-up Information     Kalpesh Goel MD Follow up in 2 week(s).    Specialty: Internal Medicine  Contact information:  [address] [facility] [facility]  [address] [place] [address]  [phone]                       Patient Instructions:      Ambulatory referral/consult to Cardiac Electrophysiology   Standing Status: Future   Referral Priority: Routine Referral Type: Consultation   Referral Reason: Specialty Services Required   Requested Specialty: Cardiology   Number of Visits Requested: 1     Diet renal     Notify your health care provider if you experience any of the following:  temperature >100.4     Notify your health care provider if you experience any of the following:  difficulty breathing or increased cough     Activity as tolerated       Significant Diagnostic Studies: Labs: All labs within the past 24 hours have been reviewed    Pending Diagnostic Studies:     Procedure Component Value Units Date/Time    EKG 12-lead [[id number]]     Order Status: Sent Lab Status: No result     Hepatitis B surface antibody [[id number]] Collected: 03/06/23 1935    Order Status: Sent Lab Status: In process Updated: 03/06/23 1953    Specimen: Blood     Hepatitis B surface antigen [[id number]] Collected: 03/06/23 1935    Order Status: Sent Lab Status: In process Updated: 03/06/23 1954    Specimen: Blood          Medications:  Reconciled Home Medications:      Medication List      CHANGE how you take these medications    amiodarone 200 MG Tab  Commonly known as: PACERONE  Take 1 tablet (200 mg total) by mouth 3 (three) times daily for 7 days, THEN 1 tablet (200 mg total) 2 (two) times daily for 23 days.  Start taking on: March 7, 2023  What changed: See the new instructions.     diltiaZEM 120 MG Cp24  Commonly known as: CARDIZEM CD  Take 1 capsule (120 mg total) by mouth once daily.  What changed: when to take this        CONTINUE taking these medications    atorvastatin 40 MG tablet  Commonly known as: LIPITOR  Take 40 mg  by mouth once daily.     b complex vitamins tablet  Take 1 tablet by mouth once daily.     calcium acetate(phosphat bind) 667 mg tablet  Commonly known as: PHOSLO  Take 667 mg by mouth 2 (two) times daily with meals.     docusate sodium 100 MG capsule  Commonly known as: COLACE  Take 100 mg by mouth 3 (three) times a week.     dorzolamide-timolol 2-0.5% 22.3-6.8 mg/mL ophthalmic solution  Commonly known as: COSOPT  Place 1 drop into both eyes 2 (two) times daily.     famotidine 20 MG tablet  Commonly known as: PEPCID  Take 1 tablet (20 mg total) by mouth nightly as needed for Heartburn.     latanoprost 0.005 % ophthalmic solution  Place 1 drop into both eyes every evening.     LIDOcaine 5 % Crea  Apply 1 application topically every Mon, Wed, Fri.     * warfarin 3 MG tablet  Commonly known as: COUMADIN  Take 3 mg by mouth every Mon, Wed, Fri.     * warfarin 3 MG tablet  Commonly known as: COUMADIN  Take 1.5 mg by mouth every Tuesday, Thursday, Saturday, Sunday.         * This list has 2 medication(s) that are the same as other medications prescribed for you. Read the directions carefully, and ask your doctor or other care provider to review them with you.                Indwelling Lines/Drains at time of discharge:   Lines/Drains/Airways     Drain  Duration                Hemodialysis AV Fistula Right upper arm -- days                Time spent on the discharge of patient: 35 minutes         Gladis De Leon MD  Department of Hospital Medicine  [facility]

## 2023-03-07 NOTE — PLAN OF CARE
"   03/07/23 1047   Readmission   Why were you hospitalized in the last 30 days? Hypotension and associated Tachycardia after HD session   Why were you readmitted? Alarmed about signs/symptoms   When you left the hospital how did you feel? "Fine"   When you left the hospital where did you go? Home Alone   Did patient/caregiver refused recommended DC plan? No   Tell me about what happened between when you left the hospital and the day you returned. Pt presented to HD with hypertension   When did you start not feeling well? HD nurse call EMS to transport Pt to emergency department   Did you try to manage your symptoms your self? No   Did you call anyone? Yes   Who did you call? Other (comments)  (HD Nurse)   Did you try to see or did see a doctor or nurse before you came? No   Why? Pt admitted to ED from HD clinic   Did you have  a follow-up appointment on discharge? Yes   Did you go? No   Why?   (Pt readmitted 1 week after discharge)   Was this a planned readmission? No       "

## 2023-03-07 NOTE — PLAN OF CARE
[facility]  Initial Discharge Assessment       Primary Care Provider: Kalpesh Goel MD    Admission Diagnosis: Atrial fibrillation with RVR [I48.91]    Admission Date: 3/6/2023  Expected Discharge Date: 3/7/2023     met with Pt at bedside to complete discharge assessment. Pt AAOx4s. Demographics, PCP, and insurance verified. Pt is <30 day readmit. No home health. No dialysis. Pt reports ability to complete ADLs with the assistance of: a rollator. Pt verbalized plan to discharge home via family transport. Pt requested a 3-in-1 bedside commode upon discharge. Pt has no other needs to be addressed at this time.    Discharge Barriers Identified: None    Payor: [employer] MEDICARE / Plan: HUMANA MEDICARE HMO / Product Type: Capitation /     Extended Emergency Contact Information  Primary Emergency Contact: Raffi Isaac  Address: [address] [place] [address] [place] [address] [place]  Mobile Phone: [phone]  Relation: Son   needed? No    Discharge Plan A: Home  Discharge Plan B: Home      [id number]  [place] - [address] [facility].  [address] [place] [address]  [place] [address]  Phone: [phone] Fax: [phone]      Initial Assessment (most recent)       Adult Discharge Assessment - 03/07/23 1038          Discharge Assessment    Assessment Type Discharge Planning Assessment     Confirmed/corrected address, phone number and insurance Yes     Confirmed Demographics Correct on Facesheet     Source of Information patient     Does patient/caregiver understand observation status Yes     Communicated ILAN with patient/caregiver Yes     Reason For Admission Atrial fibrillation with RVR     People in Home alone     Facility Arrived From: Home     Do you expect to return to your current living situation? Yes     Do you have help at home or someone to help you manage your care at home? Yes     Who are your caregiver(s) and their phone number(s)?  Raffi Isaac (Son)   [phone] (Mobile)     Prior to hospitilization cognitive status: Alert/Oriented     Current cognitive status: Alert/Oriented     Walking or Climbing Stairs ambulation difficulty, requires equipment;stair climbing difficulty, requires equipment;transferring difficulty, requires equipment     Mobility Management Rollator     Home Accessibility wheelchair accessible     Home Layout Able to live on 1st floor     Equipment Currently Used at Home rollator     Readmission within 30 days? Yes     Patient currently being followed by outpatient case management? No     Do you currently have service(s) that help you manage your care at home? No     Do you take prescription medications? Yes     Do you have prescription coverage? Yes     Coverage Payor:  [employer] MEDICARE - HUMANA MEDICARE HMO     Do you have any problems affording any of your prescribed medications? No     Is the patient taking medications as prescribed? yes     Who is going to help you get home at discharge? Raffi Isaac (Son)   [phone] (Mobile)     How do you get to doctors appointments? family or friend will provide     Are you on dialysis? Yes     Dialysis Name and Scheduled days [facility] M[facility]     Do you take coumadin? No     Discharge Plan A Home     Discharge Plan B Home     DME Needed Upon Discharge  3-in-1 commode     Discharge Plan discussed with: Patient     Discharge Barriers Identified None

## 2023-03-08 ENCOUNTER — PATIENT OUTREACH (OUTPATIENT)
Dept: FAMILY MEDICINE | Facility: CLINIC | Age: 83
End: 2023-03-08

## 2023-03-08 ENCOUNTER — TELEPHONE (OUTPATIENT)
Dept: FAMILY MEDICINE | Facility: CLINIC | Age: 83
End: 2023-03-08

## 2023-03-08 LAB
HBV SURFACE AB SER QL: REACTIVE
HBV SURFACE AG SERPL QL IA: NEGATIVE

## 2023-03-08 NOTE — TELEPHONE ENCOUNTER
Discharge Information     Discharge Date:  3/7/2023     Primary Discharge Diagnosis:  afib      Discharge Summary:  Reviewed      Medication & Order Review     Were medication changes made or new medications added?   Yes    If so, has the patient filled the prescriptions?  Yes     Was Home Health ordered? No    If so, has Home Health contacted patient and/or initiated services?  No    Name of Home Health Agency?  no    Durable Medical Equipment ordered?  No     If so, has the DME provider contacted patient and delivered equipment?   no    Follow Up               Any problems since discharge? No    How is the patient feeling since returning home?  Feeling better     Have you set up recommended follow up appointments?  (cardiology, surgery, etc.)    Schedule Hospital Follow-up appointment within 7-14 days (preferably 7).      Notes:  pt is feeling fatigue but better will keep fup             Kaci Celeste

## 2023-03-20 ENCOUNTER — HOSPITAL ENCOUNTER (OUTPATIENT)
Facility: HOSPITAL | Age: 83
Discharge: HOME | End: 2023-03-21
Attending: EMERGENCY MEDICINE | Admitting: INTERNAL MEDICINE
Payer: MEDICARE

## 2023-03-20 DIAGNOSIS — R07.9 CHEST PAIN: ICD-10-CM

## 2023-03-20 LAB
ALBUMIN SERPL BCP-MCNC: 3.5 G/DL (ref 3.5–5.2)
ALP SERPL-CCNC: 67 U/L (ref 55–135)
ALT SERPL W/O P-5'-P-CCNC: 77 U/L (ref 10–44)
ANION GAP SERPL CALC-SCNC: 12 MMOL/L (ref 8–16)
AST SERPL-CCNC: 49 U/L (ref 10–40)
BASOPHILS # BLD AUTO: 0.03 K/UL (ref 0–0.2)
BASOPHILS NFR BLD: 0.5 % (ref 0–1.9)
BILIRUB SERPL-MCNC: 0.6 MG/DL (ref 0.1–1)
BNP SERPL-MCNC: 1377 PG/ML (ref 0–99)
BUN SERPL-MCNC: 26 MG/DL (ref 8–23)
CALCIUM SERPL-MCNC: 7.9 MG/DL (ref 8.7–10.5)
CHLORIDE SERPL-SCNC: 94 MMOL/L (ref 95–110)
CO2 SERPL-SCNC: 32 MMOL/L (ref 23–29)
CREAT SERPL-MCNC: 6.2 MG/DL (ref 0.5–1.4)
DIFFERENTIAL METHOD: ABNORMAL
EOSINOPHIL # BLD AUTO: 0.7 K/UL (ref 0–0.5)
EOSINOPHIL NFR BLD: 11.2 % (ref 0–8)
ERYTHROCYTE [DISTWIDTH] IN BLOOD BY AUTOMATED COUNT: 14.5 % (ref 11.5–14.5)
EST. GFR  (NO RACE VARIABLE): 6.3 ML/MIN/1.73 M^2
GLUCOSE SERPL-MCNC: 76 MG/DL (ref 70–110)
HCT VFR BLD AUTO: 33 % (ref 37–48.5)
HGB BLD-MCNC: 10.2 G/DL (ref 12–16)
IMM GRANULOCYTES # BLD AUTO: 0.02 K/UL (ref 0–0.04)
IMM GRANULOCYTES NFR BLD AUTO: 0.3 % (ref 0–0.5)
INR PPP: 3.7 (ref 0.8–1.2)
LYMPHOCYTES # BLD AUTO: 1.2 K/UL (ref 1–4.8)
LYMPHOCYTES NFR BLD: 18.5 % (ref 18–48)
MCH RBC QN AUTO: 31.1 PG (ref 27–31)
MCHC RBC AUTO-ENTMCNC: 30.9 G/DL (ref 32–36)
MCV RBC AUTO: 101 FL (ref 82–98)
MONOCYTES # BLD AUTO: 1 K/UL (ref 0.3–1)
MONOCYTES NFR BLD: 16 % (ref 4–15)
NEUTROPHILS # BLD AUTO: 3.4 K/UL (ref 1.8–7.7)
NEUTROPHILS NFR BLD: 53.5 % (ref 38–73)
NRBC BLD-RTO: 0 /100 WBC
PLATELET # BLD AUTO: 289 K/UL (ref 150–450)
PMV BLD AUTO: 9.6 FL (ref 9.2–12.9)
POTASSIUM SERPL-SCNC: 3.8 MMOL/L (ref 3.5–5.1)
PROT SERPL-MCNC: 6.6 G/DL (ref 6–8.4)
PROTHROMBIN TIME: 36.7 SEC (ref 9–12.5)
RBC # BLD AUTO: 3.28 M/UL (ref 4–5.4)
SODIUM SERPL-SCNC: 138 MMOL/L (ref 136–145)
TROPONIN I SERPL HS-MCNC: 24.5 PG/ML (ref 0–14.9)
TROPONIN I SERPL HS-MCNC: 26.4 PG/ML (ref 0–14.9)
WBC # BLD AUTO: 6.26 K/UL (ref 3.9–12.7)

## 2023-03-20 PROCEDURE — 85025 COMPLETE CBC W/AUTO DIFF WBC: CPT | Performed by: EMERGENCY MEDICINE

## 2023-03-20 PROCEDURE — G0378 HOSPITAL OBSERVATION PER HR: HCPCS

## 2023-03-20 PROCEDURE — 99285 EMERGENCY DEPT VISIT HI MDM: CPT | Mod: 25

## 2023-03-20 PROCEDURE — 83880 ASSAY OF NATRIURETIC PEPTIDE: CPT | Performed by: EMERGENCY MEDICINE

## 2023-03-20 PROCEDURE — 84484 ASSAY OF TROPONIN QUANT: CPT | Mod: 91 | Performed by: EMERGENCY MEDICINE

## 2023-03-20 PROCEDURE — 93010 ELECTROCARDIOGRAM REPORT: CPT | Mod: ,,, | Performed by: GENERAL PRACTICE

## 2023-03-20 PROCEDURE — 80053 COMPREHEN METABOLIC PANEL: CPT | Performed by: EMERGENCY MEDICINE

## 2023-03-20 PROCEDURE — 85610 PROTHROMBIN TIME: CPT | Performed by: EMERGENCY MEDICINE

## 2023-03-20 PROCEDURE — 93010 EKG 12-LEAD: ICD-10-PCS | Mod: ,,, | Performed by: GENERAL PRACTICE

## 2023-03-20 RX ORDER — GLUCAGON 1 MG
1 KIT INJECTION
Status: DISCONTINUED | OUTPATIENT
Start: 2023-03-21 | End: 2023-03-21 | Stop reason: HOSPADM

## 2023-03-20 RX ORDER — IBUPROFEN 200 MG
24 TABLET ORAL
Status: DISCONTINUED | OUTPATIENT
Start: 2023-03-21 | End: 2023-03-21 | Stop reason: HOSPADM

## 2023-03-20 RX ORDER — NALOXONE HCL 0.4 MG/ML
0.02 VIAL (ML) INJECTION
Status: DISCONTINUED | OUTPATIENT
Start: 2023-03-21 | End: 2023-03-21 | Stop reason: HOSPADM

## 2023-03-20 RX ORDER — SODIUM CHLORIDE 0.9 % (FLUSH) 0.9 %
10 SYRINGE (ML) INJECTION EVERY 12 HOURS PRN
Status: DISCONTINUED | OUTPATIENT
Start: 2023-03-21 | End: 2023-03-21 | Stop reason: HOSPADM

## 2023-03-20 RX ORDER — SOTALOL HYDROCHLORIDE 80 MG/1
40 TABLET ORAL 2 TIMES DAILY
Status: ON HOLD | COMMUNITY
Start: 2023-03-15 | End: 2023-03-21 | Stop reason: HOSPADM

## 2023-03-20 RX ORDER — IBUPROFEN 200 MG
16 TABLET ORAL
Status: DISCONTINUED | OUTPATIENT
Start: 2023-03-21 | End: 2023-03-21 | Stop reason: HOSPADM

## 2023-03-21 ENCOUNTER — HOSPITAL ENCOUNTER (OUTPATIENT)
Dept: RADIOLOGY | Facility: HOSPITAL | Age: 83
Discharge: HOME | End: 2023-03-21
Attending: INTERNAL MEDICINE
Payer: MEDICARE

## 2023-03-21 ENCOUNTER — CLINICAL SUPPORT (OUTPATIENT)
Dept: CARDIOLOGY | Facility: HOSPITAL | Age: 83
End: 2023-03-21
Attending: INTERNAL MEDICINE
Payer: MEDICARE

## 2023-03-21 VITALS
BODY MASS INDEX: 18.8 KG/M2 | WEIGHT: 117 LBS | OXYGEN SATURATION: 97 % | TEMPERATURE: 99 F | RESPIRATION RATE: 17 BRPM | DIASTOLIC BLOOD PRESSURE: 52 MMHG | HEART RATE: 55 BPM | HEIGHT: 66 IN | SYSTOLIC BLOOD PRESSURE: 111 MMHG

## 2023-03-21 VITALS — HEIGHT: 65 IN | BODY MASS INDEX: 19.49 KG/M2 | WEIGHT: 117 LBS

## 2023-03-21 PROBLEM — R07.9 CHEST PAIN: Status: ACTIVE | Noted: 2023-03-21

## 2023-03-21 PROBLEM — Z78.9 FREQUENT HOSPITAL ADMISSIONS: Status: ACTIVE | Noted: 2023-03-21

## 2023-03-21 PROBLEM — I48.91 ATRIAL FIBRILLATION WITH RAPID VENTRICULAR RESPONSE: Status: RESOLVED | Noted: 2019-12-13 | Resolved: 2023-03-21

## 2023-03-21 PROBLEM — R07.9 CHEST PAIN: Status: RESOLVED | Noted: 2022-02-03 | Resolved: 2023-03-21

## 2023-03-21 PROBLEM — R00.1 BRADYCARDIA: Status: ACTIVE | Noted: 2023-03-21

## 2023-03-21 PROBLEM — R00.1 BRADYCARDIA: Status: RESOLVED | Noted: 2023-03-21 | Resolved: 2023-03-21

## 2023-03-21 PROBLEM — R07.9 CHEST PAIN: Status: RESOLVED | Noted: 2023-03-21 | Resolved: 2023-03-21

## 2023-03-21 PROBLEM — Z78.9 FREQUENT HOSPITAL ADMISSIONS: Status: RESOLVED | Noted: 2023-03-21 | Resolved: 2023-03-21

## 2023-03-21 LAB
ALBUMIN SERPL BCP-MCNC: 3.2 G/DL (ref 3.5–5.2)
ALP SERPL-CCNC: 65 U/L (ref 55–135)
ALT SERPL W/O P-5'-P-CCNC: 62 U/L (ref 10–44)
ANION GAP SERPL CALC-SCNC: 15 MMOL/L (ref 8–16)
AST SERPL-CCNC: 34 U/L (ref 10–40)
BASOPHILS # BLD AUTO: 0.03 K/UL (ref 0–0.2)
BASOPHILS NFR BLD: 0.6 % (ref 0–1.9)
BILIRUB SERPL-MCNC: 0.7 MG/DL (ref 0.1–1)
BSA FOR ECHO PROCEDURE: 1.56 M2
BUN SERPL-MCNC: 33 MG/DL (ref 8–23)
CALCIUM SERPL-MCNC: 8 MG/DL (ref 8.7–10.5)
CHLORIDE SERPL-SCNC: 95 MMOL/L (ref 95–110)
CO2 SERPL-SCNC: 32 MMOL/L (ref 23–29)
CREAT SERPL-MCNC: 7.2 MG/DL (ref 0.5–1.4)
CV ECHO LV RWT: 0.77 CM
CV PHARM DOSE: 0.4 MG
CV STRESS BASE HR: 54 BPM
DIASTOLIC BLOOD PRESSURE: 59 MMHG
DIFFERENTIAL METHOD: ABNORMAL
DOP CALC MV VTI: 55.4 CM
E WAVE DECELERATION TIME: 282 MSEC
E/A RATIO: 1.67
ECHO LV POSTERIOR WALL: 1.45 CM (ref 0.6–1.1)
EJECTION FRACTION: 57 %
EOSINOPHIL # BLD AUTO: 0.7 K/UL (ref 0–0.5)
EOSINOPHIL NFR BLD: 13.8 % (ref 0–8)
ERYTHROCYTE [DISTWIDTH] IN BLOOD BY AUTOMATED COUNT: 14.6 % (ref 11.5–14.5)
EST. GFR  (NO RACE VARIABLE): 5.3 ML/MIN/1.73 M^2
FRACTIONAL SHORTENING: 29 % (ref 28–44)
GLUCOSE SERPL-MCNC: 102 MG/DL (ref 70–110)
HCT VFR BLD AUTO: 30.2 % (ref 37–48.5)
HGB BLD-MCNC: 9.2 G/DL (ref 12–16)
IMM GRANULOCYTES # BLD AUTO: 0.01 K/UL (ref 0–0.04)
IMM GRANULOCYTES NFR BLD AUTO: 0.2 % (ref 0–0.5)
INR PPP: 3.6 (ref 0.8–1.2)
INTERVENTRICULAR SEPTUM: 1.43 CM (ref 0.6–1.1)
IVC DIAMETER: 2.19 CM
LEFT INTERNAL DIMENSION IN SYSTOLE: 2.67 CM (ref 2.1–4)
LEFT VENTRICLE DIASTOLIC VOLUME INDEX: 39.05 ML/M2
LEFT VENTRICLE DIASTOLIC VOLUME: 61.7 ML
LEFT VENTRICLE MASS INDEX: 127 G/M2
LEFT VENTRICLE SYSTOLIC VOLUME INDEX: 12.8 ML/M2
LEFT VENTRICLE SYSTOLIC VOLUME: 20.2 ML
LEFT VENTRICULAR INTERNAL DIMENSION IN DIASTOLE: 3.78 CM (ref 3.5–6)
LEFT VENTRICULAR MASS: 201.44 G
LYMPHOCYTES # BLD AUTO: 1.1 K/UL (ref 1–4.8)
LYMPHOCYTES NFR BLD: 23 % (ref 18–48)
MCH RBC QN AUTO: 30.5 PG (ref 27–31)
MCHC RBC AUTO-ENTMCNC: 30.5 G/DL (ref 32–36)
MCV RBC AUTO: 100 FL (ref 82–98)
MONOCYTES # BLD AUTO: 0.8 K/UL (ref 0.3–1)
MONOCYTES NFR BLD: 15.4 % (ref 4–15)
MV MEAN GRADIENT: 4 MMHG
MV PEAK A VEL: 1.03 M/S
MV PEAK E VEL: 1.72 M/S
MV PEAK GRADIENT: 12 MMHG
MV STENOSIS PRESSURE HALF TIME: 119 MS
MV VALVE AREA P 1/2 METHOD: 1.85 CM2
NEUTROPHILS # BLD AUTO: 2.3 K/UL (ref 1.8–7.7)
NEUTROPHILS NFR BLD: 47 % (ref 38–73)
NRBC BLD-RTO: 0 /100 WBC
OHS CV CPX 1 MINUTE RECOVERY HEART RATE: 66 BPM
OHS CV CPX 85 PERCENT MAX PREDICTED HEART RATE MALE: 114
OHS CV CPX MAX PREDICTED HEART RATE: 134
OHS CV CPX PATIENT IS FEMALE: 1
OHS CV CPX PATIENT IS MALE: 0
OHS CV CPX PEAK DIASTOLIC BLOOD PRESSURE: 52 MMHG
OHS CV CPX PEAK HEAR RATE: 67 BPM
OHS CV CPX PEAK RATE PRESSURE PRODUCT: 8375
OHS CV CPX PEAK SYSTOLIC BLOOD PRESSURE: 125 MMHG
OHS CV CPX PERCENT MAX PREDICTED HEART RATE ACHIEVED: 50
OHS CV CPX RATE PRESSURE PRODUCT PRESENTING: 7614
PISA TR MAX VEL: 3.06 M/S
PLATELET # BLD AUTO: 234 K/UL (ref 150–450)
PMV BLD AUTO: 9.4 FL (ref 9.2–12.9)
POTASSIUM SERPL-SCNC: 4.4 MMOL/L (ref 3.5–5.1)
PROT SERPL-MCNC: 6 G/DL (ref 6–8.4)
PROTHROMBIN TIME: 35.4 SEC (ref 9–12.5)
RBC # BLD AUTO: 3.02 M/UL (ref 4–5.4)
RV MID DIAMA: 26.4 CM
SODIUM SERPL-SCNC: 142 MMOL/L (ref 136–145)
SYSTOLIC BLOOD PRESSURE: 141 MMHG
TR MAX PG: 37 MMHG
TRICUSPID ANNULAR PLANE SYSTOLIC EXCURSION: 2.33 CM
TROPONIN I SERPL HS-MCNC: 21.6 PG/ML (ref 0–14.9)
WBC # BLD AUTO: 4.86 K/UL (ref 3.9–12.7)

## 2023-03-21 PROCEDURE — 93016 CV STRESS TEST SUPVJ ONLY: CPT | Mod: ,,, | Performed by: INTERNAL MEDICINE

## 2023-03-21 PROCEDURE — A9502 TC99M TETROFOSMIN: HCPCS

## 2023-03-21 PROCEDURE — 85610 PROTHROMBIN TIME: CPT | Performed by: INTERNAL MEDICINE

## 2023-03-21 PROCEDURE — 25000003 PHARM REV CODE 250: Performed by: NURSE PRACTITIONER

## 2023-03-21 PROCEDURE — 99223 PR INITIAL HOSPITAL CARE,LEVL III: ICD-10-PCS | Mod: ,,, | Performed by: GENERAL PRACTICE

## 2023-03-21 PROCEDURE — 93018 CV STRESS TEST I&R ONLY: CPT | Mod: ,,, | Performed by: INTERNAL MEDICINE

## 2023-03-21 PROCEDURE — 93308 TTE F-UP OR LMTD: CPT

## 2023-03-21 PROCEDURE — 63600175 PHARM REV CODE 636 W HCPCS: Performed by: INTERNAL MEDICINE

## 2023-03-21 PROCEDURE — 84484 ASSAY OF TROPONIN QUANT: CPT | Performed by: INTERNAL MEDICINE

## 2023-03-21 PROCEDURE — 93018 NUCLEAR STRESS TEST (CUPID ONLY): ICD-10-PCS | Mod: ,,, | Performed by: INTERNAL MEDICINE

## 2023-03-21 PROCEDURE — 36415 COLL VENOUS BLD VENIPUNCTURE: CPT | Performed by: INTERNAL MEDICINE

## 2023-03-21 PROCEDURE — 25000003 PHARM REV CODE 250: Performed by: INTERNAL MEDICINE

## 2023-03-21 PROCEDURE — 78452 HT MUSCLE IMAGE SPECT MULT: CPT | Mod: TC

## 2023-03-21 PROCEDURE — 80053 COMPREHEN METABOLIC PANEL: CPT | Performed by: INTERNAL MEDICINE

## 2023-03-21 PROCEDURE — 85025 COMPLETE CBC W/AUTO DIFF WBC: CPT | Performed by: INTERNAL MEDICINE

## 2023-03-21 PROCEDURE — 93017 CV STRESS TEST TRACING ONLY: CPT

## 2023-03-21 PROCEDURE — 99223 1ST HOSP IP/OBS HIGH 75: CPT | Mod: ,,, | Performed by: GENERAL PRACTICE

## 2023-03-21 PROCEDURE — G0378 HOSPITAL OBSERVATION PER HR: HCPCS

## 2023-03-21 PROCEDURE — 93308 TTE F-UP OR LMTD: CPT | Mod: 26,,, | Performed by: INTERNAL MEDICINE

## 2023-03-21 PROCEDURE — 93308 ECHO (CUPID ONLY): ICD-10-PCS | Mod: 26,,, | Performed by: INTERNAL MEDICINE

## 2023-03-21 PROCEDURE — 93016 NUCLEAR STRESS TEST (CUPID ONLY): ICD-10-PCS | Mod: ,,, | Performed by: INTERNAL MEDICINE

## 2023-03-21 RX ORDER — FAMOTIDINE 20 MG/1
20 TABLET, FILM COATED ORAL DAILY
Status: DISCONTINUED | OUTPATIENT
Start: 2023-03-21 | End: 2023-03-21

## 2023-03-21 RX ORDER — REGADENOSON 0.08 MG/ML
0.4 INJECTION, SOLUTION INTRAVENOUS
Status: COMPLETED | OUTPATIENT
Start: 2023-03-21 | End: 2023-03-21

## 2023-03-21 RX ORDER — AMINOPHYLLINE 25 MG/ML
50 INJECTION, SOLUTION INTRAVENOUS
Status: COMPLETED | OUTPATIENT
Start: 2023-03-21 | End: 2023-03-21

## 2023-03-21 RX ORDER — CALCIUM ACETATE 667 MG/1
667 CAPSULE ORAL DAILY
Status: DISCONTINUED | OUTPATIENT
Start: 2023-03-21 | End: 2023-03-21 | Stop reason: HOSPADM

## 2023-03-21 RX ORDER — SODIUM CHLORIDE 9 MG/ML
INJECTION, SOLUTION INTRAVENOUS ONCE
Status: CANCELLED | OUTPATIENT
Start: 2023-03-21 | End: 2023-03-21

## 2023-03-21 RX ORDER — ATORVASTATIN CALCIUM 40 MG/1
40 TABLET, FILM COATED ORAL DAILY
Status: DISCONTINUED | OUTPATIENT
Start: 2023-03-21 | End: 2023-03-21 | Stop reason: HOSPADM

## 2023-03-21 RX ORDER — MUPIROCIN 20 MG/G
OINTMENT TOPICAL 2 TIMES DAILY
Status: DISCONTINUED | OUTPATIENT
Start: 2023-03-21 | End: 2023-03-21 | Stop reason: HOSPADM

## 2023-03-21 RX ORDER — FAMOTIDINE 20 MG/1
20 TABLET, FILM COATED ORAL EVERY OTHER DAY
Status: DISCONTINUED | OUTPATIENT
Start: 2023-03-21 | End: 2023-03-21 | Stop reason: HOSPADM

## 2023-03-21 RX ORDER — DOCUSATE SODIUM 100 MG/1
100 CAPSULE, LIQUID FILLED ORAL
Status: DISCONTINUED | OUTPATIENT
Start: 2023-03-21 | End: 2023-03-21 | Stop reason: HOSPADM

## 2023-03-21 RX ADMIN — REGADENOSON 0.4 MG: 0.08 INJECTION, SOLUTION INTRAVENOUS at 11:03

## 2023-03-21 RX ADMIN — FAMOTIDINE 20 MG: 20 TABLET ORAL at 09:03

## 2023-03-21 RX ADMIN — ATORVASTATIN CALCIUM 40 MG: 40 TABLET, FILM COATED ORAL at 09:03

## 2023-03-21 RX ADMIN — AMINOPHYLLINE 50 MG: 25 INJECTION, SOLUTION INTRAVENOUS at 11:03

## 2023-03-21 RX ADMIN — CALCIUM ACETATE 667 MG: 667 CAPSULE ORAL at 09:03

## 2023-03-21 RX ADMIN — MUPIROCIN 1 G: 20 OINTMENT TOPICAL at 01:03

## 2023-03-21 NOTE — PLAN OF CARE
03/21/23 1458   WARD Message   Medicare Outpatient and Observation Notification regarding financial responsibility Given to patient/caregiver;Explained to patient/caregiver;Signed/date by patient/caregiver   Date WARD was signed 03/21/23   Time WARD was signed [id number]

## 2023-03-21 NOTE — ASSESSMENT & PLAN NOTE
Two weeks ago when pt got admitted , pt was prescribed only amiodarone and cardizem.  Pt says she takes sotalol also which has been discontinued   Per Chart review , pt has H/o Noncompliance

## 2023-03-21 NOTE — PLAN OF CARE
03/21/23 1507   Final Note   Assessment Type Final Discharge Note   Anticipated Discharge Disposition Home   What phone number can be called within the next 1-3 days to see how you are doing after discharge? [phone]   Hospital Resources/Appts/Education Provided Appointments scheduled by Navigator/Coordinator;Isabel schedule provided   Post-Acute Status   Discharge Delays None known at this time     Discharge orders and chart reviewed. No other discharge needs noted at this time. Pt is clear for discharge from case management. Pt is discharging to home.    Patient to follow up with nephrologist and HD as normal schedule.     Maddie notified of [facility] discharge and plan for patient to return to scheduled chair time on tomorrow.

## 2023-03-21 NOTE — ED PROVIDER NOTES
Encounter Date: 3/20/2023       History     Chief Complaint   Patient presents with    Chest Pain     Pt arrived by ems from home, pt is dialysis(last tx this am), pt cc chest tightness non radiating, pt that states she was prescribed a new medication 1 week ago(sotalol hcl) and it is making her have nausea with each dose. Prior to dialysis she had vomiting from medication     Chief complaint is chest pain.  She complains of a tightness across her anterior chest starting around 1:00 p.m..  This is after dialysis.  She came in by ambulance had 4 baby aspirin when nitro now is pain-free.  She does not usually get chest pain like this.  She does have history of an MI 10 years ago.  She does have a history of atrial fibrillation with RVR at 1 point time.  At the present time she has only mild shortness of breath.  She is almost lying flat in the bed.      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    Osteopenia of multiple sites 3/9/2018    Based upon bone density measurements. Patient also has chronic kidney disease.    Stroke 11/2016     Past Surgical History:   Procedure Laterality Date    ANGIOGRAM, CORONARY, WITH LEFT HEART CATHETERIZATION N/A 2/7/2022    Procedure: Angiogram, Coronary, with Left Heart Cath;  Surgeon: Gino Leal MD;  Location: [facility] CATH/EP LAB;  Service: Cardiology;  Laterality:  N/A;    CARDIAC SURGERY      stents    EYE SURGERY      WRIST SURGERY       Family History   Problem Relation Age of Onset    Heart disease Mother     Cancer Father      Social History     Tobacco Use    Smoking status: Never    Smokeless tobacco: Never   Substance Use Topics    Alcohol use: No    Drug use: No     Review of Systems   Constitutional:  Negative for chills and fever.   HENT:  Negative for ear pain, rhinorrhea and sore throat.    Eyes:  Negative for pain and visual disturbance.   Respiratory:  Positive for chest tightness and shortness of breath. Negative for cough.    Cardiovascular:  Negative for chest pain and palpitations.   Gastrointestinal:  Negative for abdominal pain, constipation, diarrhea, nausea and vomiting.   Genitourinary:  Negative for dysuria, frequency, hematuria and urgency.   Musculoskeletal:  Negative for back pain, joint swelling and myalgias.   Skin:  Negative for rash.   Neurological:  Negative for dizziness, seizures, weakness and headaches.   Psychiatric/Behavioral:  Negative for dysphoric mood. The patient is not nervous/anxious.      Physical Exam     Initial Vitals   BP Pulse Resp Temp SpO2   03/20/23 1851 03/20/23 1851 03/20/23 1851 03/20/23 1852 03/20/23 1851   (!) 104/52 (!) 51 18 98.2 °F (36.8 °C) 97 %      MAP       --                Physical Exam    Nursing note and vitals reviewed.  Constitutional: She appears well-developed and well-nourished.   HENT:   Head: Normocephalic and atraumatic.   Eyes: Conjunctivae, EOM and lids are normal. Pupils are equal, round, and reactive to light.   Neck: Trachea normal. Neck supple. No thyroid mass and no thyromegaly present.   Normal range of motion.  Cardiovascular:  Normal rate, regular rhythm and normal heart sounds.           Pulmonary/Chest: Effort normal and breath sounds normal.   Abdominal: Abdomen is soft. There is no abdominal tenderness.   Musculoskeletal:         General: Normal range of motion.      Cervical back:  Normal range of motion and neck supple.     Neurological: She is alert and oriented to person, place, and time. She has normal strength and normal reflexes. No cranial nerve deficit or sensory deficit.   Skin: Skin is warm and dry.   No calf pain to palpation.   Psychiatric: She has a normal mood and affect. Her speech is normal and behavior is normal. Judgment and thought content normal.       ED Course   Procedures  Labs Reviewed   CBC W/ AUTO DIFFERENTIAL - Abnormal; Notable for the following components:       Result Value    RBC 3.28 (*)     Hemoglobin 10.2 (*)     Hematocrit 33.0 (*)      (*)     MCH 31.1 (*)     MCHC 30.9 (*)     Eos # 0.7 (*)     Mono % 16.0 (*)     Eosinophil % 11.2 (*)     All other components within normal limits   COMPREHENSIVE METABOLIC PANEL - Abnormal; Notable for the following components:    Chloride 94 (*)     CO2 32 (*)     BUN 26 (*)     Creatinine 6.2 (*)     Calcium 7.9 (*)     AST 49 (*)     ALT 77 (*)     eGFR 6.3 (*)     All other components within normal limits   B-TYPE NATRIURETIC PEPTIDE - Abnormal; Notable for the following components:    BNP 1,377 (*)     All other components within normal limits   TROPONIN I HIGH SENSITIVITY - Abnormal; Notable for the following components:    Troponin I High Sensitivity 26.4 (*)     All other components within normal limits   TROPONIN I HIGH SENSITIVITY - Abnormal; Notable for the following components:    Troponin I High Sensitivity 24.5 (*)     All other components within normal limits   PROTIME-INR - Abnormal; Notable for the following components:    Prothrombin Time 36.7 (*)     INR 3.7 (*)     All other components within normal limits          Imaging Results              X-Ray Chest AP Portable (In process)                      Medications   sodium chloride 0.9% flush 10 mL (has no administration in time range)   naloxone 0.4 mg/mL injection 0.02 mg (has no administration in time range)   glucose chewable tablet 16 g (has no  administration in time range)   glucose chewable tablet 24 g (has no administration in time range)   glucagon (human recombinant) injection 1 mg (has no administration in time range)   dextrose 10% bolus 125 mL 125 mL (has no administration in time range)   dextrose 10% bolus 250 mL 250 mL (has no administration in time range)     Medical Decision Making:   ED Management:  Chief complaint is chest pain.  Differential diagnosis includes acute coronary syndrome unstable angina cardiac arrhythmia pericarditis aortic dissection pneumothorax pulmonary embolus pancreatitis GERD among others.  Will be evaluated for chest pain.                        Clinical Impression:   Final diagnoses:  [R07.9] Chest pain        ED Disposition Condition    Observation                 Saturnino Fritz MD  03/21/23 0057

## 2023-03-21 NOTE — PROGRESS NOTES
Pharmacist Renal Dose Adjustment Note    Tyra Isaac is a 82 y.o. female being treated with the medication Famotidine    Patient Data:    Vital Signs (Most Recent):  Temp: 97.8 °F (36.6 °C) (03/21/23 0030)  Pulse: (!) 51 (03/21/23 0030)  Resp: 16 (03/21/23 0030)  BP: (!) 116/55 (03/21/23 0030)  SpO2: 96 % (03/21/23 0030)   Vital Signs (72h Range):  Temp:  [97.8 °F (36.6 °C)-98.2 °F (36.8 °C)]   Pulse:  [49-62]   Resp:  [16-21]   BP: (102-139)/(51-61)   SpO2:  [96 %-100 %]      Recent Labs   Lab 03/20/23 1907   CREATININE 6.2*     Serum creatinine: 6.2 mg/dL (H) 03/20/23 1907  Estimated creatinine clearance: 5.9 mL/min (A)    Per [facility] renal dosing protocol:     Previous Order: Famotidine 20 mg QD    Will be changed to:     New Order:Famotidine 20 mg QOD,    Due to: CrCl < 30 mL/min    Renal dose adjustments performed as noted above.    We will continue monitoring and adjusting as necessary.    Pharmacist: Jayjay Talbot, PharmD  Ext: [phone]

## 2023-03-21 NOTE — PLAN OF CARE
[facility]  Initial Discharge Assessment       Primary Care Provider: Kalpesh Goel MD    Admission Diagnosis: Chest pain [R07.9]    Admission Date: 3/20/2023  Expected Discharge Date: 3/21/2023    Discharge assessment completed with patient at bedside. Information verified as correct on facesheet. Patient reports independent in ADLs with the assistance of her DME listed below. Patient drives self to appointments, but car just broke down. Patient inquired about transport to/from dialysis. Case management encouraged patient to follow up with  at clinic. Case management also left voicemail with clinic SW to notify of need for transport. Patient denies additional needs at this time.     Discharge Barriers Identified: None    Payor: [employer] MANAGED MEDICARE / Plan: [employer] MEDICARE HMO / Product Type: Capitation /     Extended Emergency Contact Information  Primary Emergency Contact: Raffi Isaac  Address: [address] [place] [address] [place] [address] [facility]  Home Phone: [phone]  Mobile Phone: [phone]  Relation: Son   needed? No    Discharge Plan A: Home  Discharge Plan B: Home, Home Health      [id number] - [place] - [address] [facility]  [address]  [place] [address]  Phone: [phone] Fax: [phone]      Initial Assessment (most recent)       Adult Discharge Assessment - 03/21/23 1459          Discharge Assessment    Assessment Type Discharge Planning Assessment     Confirmed/corrected address, phone number and insurance Yes     Confirmed Demographics Correct on Facesheet     Source of Information patient     Communicated ILAN with patient/caregiver Yes     Reason For Admission chest pain     People in Home alone     Facility Arrived From: home     Do you expect to return to your current living situation? Yes     Do you have help at home or someone to help you manage your care at home? No     Prior to hospitilization  cognitive status: Alert/Oriented     Current cognitive status: Alert/Oriented     Equipment Currently Used at Home wheelchair     Readmission within 30 days? Yes     Patient currently being followed by outpatient case management? No     Do you currently have service(s) that help you manage your care at home? No     Do you take prescription medications? Yes     Do you have prescription coverage? Yes     Coverage [employer] managed medicare     Do you have any problems affording any of your prescribed medications? No     Is the patient taking medications as prescribed? yes     Who is going to help you get home at discharge? family     How do you get to doctors appointments? car, drives self     Are you on dialysis? Yes     Dialysis Name and Scheduled days [facility] MWF 0600 chair time     Do you take coumadin? No     Discharge Plan A Home     Discharge Plan B Home;Home Health     DME Needed Upon Discharge  none     Discharge Plan discussed with: Patient     Discharge Barriers Identified None

## 2023-03-21 NOTE — ASSESSMENT & PLAN NOTE
Atypical  After Dialysis pt has been suffering from hypotension/tachycardia/chest pain ;Recently

## 2023-03-21 NOTE — SUBJECTIVE & OBJECTIVE
Interval History: \    Review of Systems   Constitutional:  Negative for activity change and appetite change.   HENT:  Negative for congestion and dental problem.    Eyes:  Negative for discharge and itching.   Respiratory:  Negative for shortness of breath.    Cardiovascular:  Negative for chest pain.   Gastrointestinal:  Negative for abdominal distention and abdominal pain.   Endocrine: Negative for cold intolerance.   Genitourinary:  Negative for difficulty urinating and dysuria.   Musculoskeletal:  Negative for arthralgias and back pain.   Skin:  Negative for color change.   Neurological:  Negative for dizziness and facial asymmetry.   Hematological:  Negative for adenopathy.   Psychiatric/Behavioral:  Negative for agitation and behavioral problems.    Objective:     Vital Signs (Most Recent):  Temp: 98.7 °F (37.1 °C) (03/21/23 0720)  Pulse: (!) 57 (03/21/23 0720)  Resp: 18 (03/21/23 0720)  BP: (!) 101/57 (03/21/23 0720)  SpO2: 96 % (03/21/23 1100)   Vital Signs (24h Range):  Temp:  [97.8 °F (36.6 °C)-98.7 °F (37.1 °C)] 98.7 °F (37.1 °C)  Pulse:  [49-62] 57  Resp:  [15-21] 18  SpO2:  [96 %-100 %] 96 %  BP: (101-139)/(49-61) 101/57     Weight: 53.1 kg (117 lb)  Body mass index is 19.17 kg/m².    Intake/Output Summary (Last 24 hours) at 3/21/2023 1523  Last data filed at 3/21/2023 0836  Gross per 24 hour   Intake 0 ml   Output --   Net 0 ml      Physical Exam  Vitals and nursing note reviewed.   Constitutional:       General: She is not in acute distress.  HENT:      Head: Atraumatic.      Right Ear: External ear normal.      Left Ear: External ear normal.      Nose: Nose normal.      Mouth/Throat:      Mouth: Mucous membranes are moist.   Cardiovascular:      Rate and Rhythm: Normal rate.   Pulmonary:      Effort: Pulmonary effort is normal.   Musculoskeletal:         General: Normal range of motion.      Cervical back: Normal range of motion.   Skin:     General: Skin is warm.   Neurological:      Mental Status:  She is alert and oriented to person, place, and time.   Psychiatric:         Behavior: Behavior normal.       Significant Labs: All pertinent labs within the past 24 hours have been reviewed.  CBC:   Recent Labs   Lab 03/20/23  1907 03/21/23  0345   WBC 6.26 4.86   HGB 10.2* 9.2*   HCT 33.0* 30.2*    234     CMP:   Recent Labs   Lab 03/20/23 1907 03/21/23  0345    142   K 3.8 4.4   CL 94* 95   CO2 32* 32*   GLU 76 102   BUN 26* 33*   CREATININE 6.2* 7.2*   CALCIUM 7.9* 8.0*   PROT 6.6 6.0   ALBUMIN 3.5 3.2*   BILITOT 0.6 0.7   ALKPHOS 67 65   AST 49* 34   ALT 77* 62*   ANIONGAP 12 15       Significant Imaging: I have reviewed all pertinent imaging results/findings within the past 24 hours.

## 2023-03-21 NOTE — HOSPITAL COURSE
Pt got admitted with chest pain  Pt was evaluated by Cardiology and had stress test  Stress test done was normal  This is pts Third IP admission since 02/25/23  Pt suffered from SVT/A Fib RVR before whenever she had hypotension soon after Dialysis  Pt stopped taking sotalol and amiodarone because of side effects  Pt was instructed to take cardizem and educated not to skip it  Pt will follow up with Regular Cardiology MD(Dr Thompson ) on outpatient basis

## 2023-03-21 NOTE — PLAN OF CARE
Problem: Adult Inpatient Plan of Care  Goal: Plan of Care Review  Outcome: Ongoing, Progressing  Goal: Readiness for Transition of Care  Outcome: Ongoing, Progressing

## 2023-03-21 NOTE — H&P
Counts include 234 beds at the [facility]    History & Physical      Patient Name: Tyra Isaac  MRN: [id number]  Admission Date: 3/20/2023  Attending Physician: Zi Gomez MD   Primary Care Provider: Kalpesh Goel MD         Patient information was obtained from patient and ER records.     Subjective:     Principal Problem:Chest pain    Chief Complaint:   Chief Complaint   Patient presents with    Chest Pain     Pt arrived by ems from home, pt is dialysis(last tx this am), pt cc chest tightness non radiating, pt that states she was prescribed a new medication 1 week ago(sotalol hcl) and it is making her have nausea with each dose. Prior to dialysis she had vomiting from medication        HPI:  A 82-year-old female history of ESRD on HD via right upper extremity AVF Monday Wednesday Friday (Dr Valero), AFib on Cardizem and Coumadin, gout, hypertension.    Per chart review she is had several issues during dialysis.  In December 9, 2022 she had rapid palpitations and shortness of breath, AFib with RVR episode.  In February 24, 2023 she would episodes with hypotension and tachycardia, for which she was admitted, thought to be related to SVT.  Earlier this month March 6, 2023 she would episode of AFib with RVR during dialysis, for which she was admitted.    She presents today with chief complaint of chest pain.  Pain is described as tightness across her chest lasting for several minutes.  EMS gave nitroglycerin and 4 baby aspirin.  She is pain-free in the ER.  Reportedly has a history of MI 10 years ago.  Currently she is symptom free except for mild shortness of breath.  She recently started sotalol and states she has adverse reaction to this and does not want to take it any longer.    Patient to be admitted for evaluation and management of chest pain.    Ten point review systems otherwise negative.    Past Medical History:   Diagnosis Date    A-fib     Anxiety     Depression     Disorder of kidney and ureter      Encephalopathy acute 1/1/2018    End stage kidney disease 6/17/2017    Gout     Hyperlipidemia     Hypertension     Moderate episode of recurrent major depressive disorder 1/17/2018    Nephropathy hypertensive, 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    Osteopenia of multiple sites 3/9/2018    Based upon bone density measurements. Patient also has chronic kidney disease.    Stroke 11/2016       Past Surgical History:   Procedure Laterality Date    ANGIOGRAM, CORONARY, WITH LEFT HEART CATHETERIZATION N/A 2/7/2022    Procedure: Angiogram, Coronary, with Left Heart Cath;  Surgeon: Gino Leal MD;  Location: [facility] CATH/EP LAB;  Service: Cardiology;  Laterality: N/A;    CARDIAC SURGERY      stents    EYE SURGERY      WRIST SURGERY         Review of patient's allergies indicates:   Allergen Reactions    Cyclobenzaprine     Fish containing products Hives    Peanut Other (See Comments)    Tramadol Itching       No current facility-administered medications on file prior to encounter.     Current Outpatient Medications on File Prior to Encounter   Medication Sig    atorvastatin (LIPITOR) 40 MG tablet Take 40 mg by mouth once daily.    b complex vitamins tablet Take 1 tablet by mouth once daily.    calcium acetate,phosphat bind, (PHOSLO) 667 mg tablet Take 667 mg by mouth 2 (two) times daily with meals.    diltiaZEM (CARDIZEM CD) 120 MG Cp24 Take 1 capsule (120 mg total) by mouth once daily.    docusate sodium (COLACE) 100 MG capsule Take 100 mg by mouth 3 (three) times a week.    dorzolamide-timolol 2-0.5% (COSOPT) 22.3-6.8 mg/mL ophthalmic solution Place 1 drop into both eyes 2 (two) times daily.     famotidine (PEPCID) 20 MG tablet Take 1 tablet (20 mg total) by mouth nightly as needed for Heartburn.    latanoprost 0.005 % ophthalmic solution Place 1 drop into both eyes every evening.      sotaloL (BETAPACE) 80 MG tablet Take 40 mg by mouth 2 (two) times daily.    warfarin (COUMADIN) 3 MG tablet Take 3 mg by mouth every Mon, Wed, Fri.    warfarin (COUMADIN) 3 MG tablet Take 1.5 mg by mouth every Tuesday, Thursday, Saturday, Sunday.    amiodarone (PACERONE) 200 MG Tab Take 1 tablet (200 mg total) by mouth 3 (three) times daily for 7 days, THEN 1 tablet (200 mg total) 2 (two) times daily for 23 days.    lidocaine 5 % Crea Apply 1 application topically every Mon, Wed, Fri.      Family History       Problem Relation (Age of Onset)    Cancer Father    Heart disease Mother          Tobacco Use    Smoking status: Never    Smokeless tobacco: Never   Substance and Sexual Activity    Alcohol use: No    Drug use: No    Sexual activity: Not Currently     Review of Systems  Objective:     Vital Signs (Most Recent):  Temp: 97.8 °F (36.6 °C) (03/21/23 0030)  Pulse: (!) 51 (03/21/23 0030)  Resp: 16 (03/21/23 0030)  BP: (!) 116/55 (03/21/23 0030)  SpO2: 96 % (03/21/23 0030)   Vital Signs (24h Range):  Temp:  [97.8 °F (36.6 °C)-98.2 °F (36.8 °C)] 97.8 °F (36.6 °C)  Pulse:  [49-62] 51  Resp:  [16-21] 16  SpO2:  [96 %-100 %] 96 %  BP: (102-139)/(51-61) 116/55     Weight: 53.1 kg (117 lb)  Body mass index is 19.17 kg/m².    Physical Exam  Constitutional:       Appearance: Normal appearance. She is normal weight.   HENT:      Head: Normocephalic and atraumatic.      Right Ear: External ear normal.      Left Ear: External ear normal.      Nose: Nose normal.      Mouth/Throat:      Mouth: Mucous membranes are moist.      Pharynx: Oropharynx is clear.   Eyes:      Extraocular Movements: Extraocular movements intact.      Pupils: Pupils are equal, round, and reactive to light.   Cardiovascular:      Rate and Rhythm: Bradycardia present.      Pulses: Normal pulses.      Heart sounds: Normal heart sounds.   Pulmonary:      Effort: Pulmonary effort is normal.      Breath sounds: Normal breath sounds.   Abdominal:      General:  "Abdomen is flat. Bowel sounds are normal.   Musculoskeletal:         General: Normal range of motion.   Skin:     General: Skin is warm.      Capillary Refill: Capillary refill takes less than 2 seconds.   Neurological:      General: No focal deficit present.      Mental Status: She is alert and oriented to person, place, and time. Mental status is at baseline.   Psychiatric:         Mood and Affect: Mood normal.         Behavior: Behavior normal.         CRANIAL NERVES     CN III, IV, VI   Pupils are equal, round, and reactive to light.    Significant Labs: All pertinent labs within the past 24 hours have been reviewed.    Significant Imaging: I have reviewed all pertinent imaging results/findings within the past 24 hours.    Assessment/Plan:     Active Diagnoses:    Diagnosis Date Noted POA    PRINCIPAL PROBLEM:  Chest pain [R07.9] 02/03/2022 Unknown      Problems Resolved During this Admission:     VTE Risk Mitigation (From admission, onward)           Ordered     IP VTE HIGH RISK PATIENT  Once         03/20/23 2311     Place sequential compression device  Until discontinued         03/20/23 2311                  1. Chest pain during dialysis, associated with some hypotension, bradycardia  -NPO for Lexiscan  -EKG with bradycardia, not concerning for ACS, troponin negligible  -trend troponins, EKG  -cardiology consult, Gastroenterology consult  -check TSH    2. Bradycardia-hold PTA Cardizem 180 mg    3. Subjective adverse reaction to sotalol 40 mg bid  -"I dont feel right:  -states she does not want take sotalol, cardiology requested to comment if alternative therapy necessary    4. ESRD Monday Wednesday Friday  -consult Dr. Valero's group    5.Atrial fibrillation  6. Elevated INR 3.7  -hold PTA Coumadin tonight. Can resume Coumadin tomorrow if INR not elevated  -Home Rx: Warfarin 3 mg every Monday Wednesday Friday, 1.5 mg every Tuesday Thursday Saturday Sunday    High risk for decline  Rest of plan per hospital " course      Zi Gomez MD  Department of Hospital Medicine   [facility]

## 2023-03-21 NOTE — CONSULTS
Consult Note  Nephrology    Consult Requested By: Ousmane Mcgregor MD    Reason for Consult: ESRD, dependent on dialysis    SUBJECTIVE:     History of Present Illness:   81 y/o female patient known to our practice, has out patient dialysis 3x wk on MWF schedule.  She presented to ER yesterday evening w/complaint of chest pain, does have cardiac hx--MI several years ago, dysrhythmia.  Nephrology is consulted for dialysis orders.    3/21  stress test in progress.  Ordered HD on MWF schedule, no acute needs today.  No complaints.    Assessment/plan:    ESRD  SHPT  Anemia of chronic dz  HTN    --continue HD on MWF schedule.  Dose all medications for dialysis  --continue Ca+ acetate w/meals  --epo w/HD  --BP controlled    Past Medical History:   Diagnosis Date    A-fib     Anxiety     Depression     Disorder of kidney and ureter     Encephalopathy acute 1/1/2018    End stage kidney disease 6/17/2017    Gout     Hyperlipidemia     Hypertension     Moderate episode of recurrent major depressive disorder 1/17/2018    Nephropathy hypertensive, 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    Osteopenia of multiple sites 3/9/2018    Based upon bone density measurements. Patient also has chronic kidney disease.    Stroke 11/2016     Past Surgical History:   Procedure Laterality Date    ANGIOGRAM, CORONARY, WITH LEFT HEART CATHETERIZATION N/A 2/7/2022    Procedure: Angiogram, Coronary, with Left Heart Cath;  Surgeon: Gino Leal MD;  Location: [facility] CATH/EP LAB;  Service: Cardiology;  Laterality: N/A;    CARDIAC SURGERY      stents    EYE SURGERY      WRIST SURGERY       Family History   Problem Relation Age of Onset    Heart disease Mother     Cancer Father      Social History     Tobacco Use    Smoking status: Never    Smokeless tobacco: Never   Substance Use Topics    Alcohol use: No    Drug use: No        Review of patient's allergies indicates:   Allergen Reactions    Cyclobenzaprine     Fish containing products Hives    Peanut Other (See Comments)    Tramadol Itching        Review of Systems:  Negative unless noted above    OBJECTIVE:     Vital Signs Range (Last 24H):  Temp:  [97.8 °F (36.6 °C)-98.7 °F (37.1 °C)]   Pulse:  [49-62]   Resp:  [15-21]   BP: (101-139)/(49-61)   SpO2:  [96 %-100 %]     Physical Exam:  General- NAD noted  HEENT- WNL  Neck- supple  CV- Regular rate and rhythm  Resp- No increased WOB  GI- Non tender/non-distended  Extrem- No cyanosis, clubbing, edema.  Derm- skin w/d  Neuro-  No flap.     Body mass index is 19.17 kg/m².    Laboratory:  CBC:   Recent Labs   Lab 03/21/23  0345   WBC 4.86   RBC 3.02*   HGB 9.2*   HCT 30.2*      *   MCH 30.5   MCHC 30.5*     CMP:   Recent Labs   Lab 03/21/23  0345      CALCIUM 8.0*   ALBUMIN 3.2*   PROT 6.0      K 4.4   CO2 32*   CL 95   BUN 33*   CREATININE 7.2*   ALKPHOS 65   ALT 62*   AST 34   BILITOT 0.7       Diagnostic Results:  Labs: Reviewed    EKG:     ASSESSMENT/PLAN:     Active Hospital Problems    Diagnosis  POA    *Chest pain [R07.9]  Unknown      Resolved Hospital Problems   No resolved problems to display.         Thank you for allowing us to participate in the care of your patient. We will follow the patient and provide recommendations as needed.      Time spent seeing patient( greater than 1/2 spent in direct contact) :     Arpita Salas NP

## 2023-03-21 NOTE — NURSING
Discharge paperwork reviewed and discussed with patient. Tele removed. PIV removed, intact. Cab called for transport home. No issues/complaints.

## 2023-03-21 NOTE — PROGRESS NOTES
[facility] Medicine  Progress Note    Patient Name: Tyra Isaac  MRN: [id number]  Patient Class: OP- Observation   Admission Date: 3/20/2023  Length of Stay: 0 days  Attending Physician: Ousmane Mcgregor MD  Primary Care Provider: Kalpesh Goel MD        Subjective:     Principal Problem:Chest pain        HPI:  No notes on file    Overview/Hospital Course:  3/21  Stress etst was normal  Cardiology team wants to keep pt one more day        Interval History: \    Review of Systems   Constitutional:  Negative for activity change and appetite change.   HENT:  Negative for congestion and dental problem.    Eyes:  Negative for discharge and itching.   Respiratory:  Negative for shortness of breath.    Cardiovascular:  Negative for chest pain.   Gastrointestinal:  Negative for abdominal distention and abdominal pain.   Endocrine: Negative for cold intolerance.   Genitourinary:  Negative for difficulty urinating and dysuria.   Musculoskeletal:  Negative for arthralgias and back pain.   Skin:  Negative for color change.   Neurological:  Negative for dizziness and facial asymmetry.   Hematological:  Negative for adenopathy.   Psychiatric/Behavioral:  Negative for agitation and behavioral problems.    Objective:     Vital Signs (Most Recent):  Temp: 98.7 °F (37.1 °C) (03/21/23 0720)  Pulse: (!) 57 (03/21/23 0720)  Resp: 18 (03/21/23 0720)  BP: (!) 101/57 (03/21/23 0720)  SpO2: 96 % (03/21/23 1100)   Vital Signs (24h Range):  Temp:  [97.8 °F (36.6 °C)-98.7 °F (37.1 °C)] 98.7 °F (37.1 °C)  Pulse:  [49-62] 57  Resp:  [15-21] 18  SpO2:  [96 %-100 %] 96 %  BP: (101-139)/(49-61) 101/57     Weight: 53.1 kg (117 lb)  Body mass index is 19.17 kg/m².    Intake/Output Summary (Last 24 hours) at 3/21/2023 1523  Last data filed at 3/21/2023 0836  Gross per 24 hour   Intake 0 ml   Output --   Net 0 ml      Physical Exam  Vitals and nursing note reviewed.   Constitutional:       General: She is not in acute  distress.  HENT:      Head: Atraumatic.      Right Ear: External ear normal.      Left Ear: External ear normal.      Nose: Nose normal.      Mouth/Throat:      Mouth: Mucous membranes are moist.   Cardiovascular:      Rate and Rhythm: Normal rate.   Pulmonary:      Effort: Pulmonary effort is normal.   Musculoskeletal:         General: Normal range of motion.      Cervical back: Normal range of motion.   Skin:     General: Skin is warm.   Neurological:      Mental Status: She is alert and oriented to person, place, and time.   Psychiatric:         Behavior: Behavior normal.       Significant Labs: All pertinent labs within the past 24 hours have been reviewed.  CBC:   Recent Labs   Lab 03/20/23 1907 03/21/23  0345   WBC 6.26 4.86   HGB 10.2* 9.2*   HCT 33.0* 30.2*    234     CMP:   Recent Labs   Lab 03/20/23 1907 03/21/23  0345    142   K 3.8 4.4   CL 94* 95   CO2 32* 32*   GLU 76 102   BUN 26* 33*   CREATININE 6.2* 7.2*   CALCIUM 7.9* 8.0*   PROT 6.6 6.0   ALBUMIN 3.5 3.2*   BILITOT 0.6 0.7   ALKPHOS 67 65   AST 49* 34   ALT 77* 62*   ANIONGAP 12 15       Significant Imaging: I have reviewed all pertinent imaging results/findings within the past 24 hours.      Assessment/Plan:      * Chest pain  Atypical  After Dialysis pt has been suffering from hypotension/tachycardia/chest pain ;Recently       Atrial fibrillation with RVR with history of known paroxysmal atrial fibrillation  Two weeks ago when pt got admitted , pt was prescribed only amiodarone and cardizem.  Pt says she takes sotalol also which has been discontinued   Per Chart review , pt has H/o Noncompliance           Bradycardia  Relative bradycardia   Hold all AV veronika blockers      Frequent hospital admissions  This is pts third admission to hospital since 02/25/23      CAD s/p PCI  Aware       End stage renal disease on dialysis  HD as per Nephro Team        VTE Risk Mitigation (From admission, onward)         Ordered     IP VTE HIGH RISK  PATIENT  Once         03/20/23 2311     Place sequential compression device  Until discontinued         03/20/23 2311                Discharge Planning   ILAN: 3/21/2023     Code Status: Full Code   Is the patient medically ready for discharge?:     Reason for patient still in hospital (select all that apply): Treatment  Discharge Plan A: Home   Discharge Delays: None known at this time              Ousmane Mcgregor MD  Department of Hospital Medicine   [facility]

## 2023-03-21 NOTE — PROGRESS NOTES
@  11:48 Patient injected with Myoview for Stress images.  Lexiscan Stress    RELEASE NPO STATUS FROM NUCLEAR MEDICINE.

## 2023-03-21 NOTE — CONSULTS
Formerly [facility], [facility]  Department of Cardiology  Consult Note      PATIENT NAME: Tyra Isaac  MRN: [id number]  TODAY'S DATE: 03/21/2023  ADMIT DATE: 3/20/2023                          CONSULT REQUESTED BY: Ousmane Mcgregor MD    SUBJECTIVE     PRINCIPAL PROBLEM: Chest pain      REASON FOR CONSULT:  Chest Pain  Pt arrived by ems from home, pt is dialysis(last tx this am), pt cc chest tightness non radiating, pt that states she was prescribed a new medication 1 week ago(sotalol hcl) and it is making her have nausea with each dose. Prior to dialysis she had vomiting from medication    HPI:    Patient is an 82 female with past medical history of ESRD on HD, atrial fibrillation on Cardizem and Coumadin, gout, hypertension, hyperlipidemia, stroke who presented to the ED black yesterday for chest pain back in a.m..  She describes the pain as tightness across her chest lasting several minutes.  EMS gave nitro and 4 baby aspirin.  She was pain-free when she arrived to the ED. patient was recently started on sotalol for AFib RVR.  Patient has had multiple recent admissions for tachycardia, AFib RVR and hypotension.  During last admission, patient was discharged on amiodarone 200mg TID for 7 days, then 200 mg BID  and Diltiazem 120mg daily. Patient states she followed with Dr. Thompson and he altered her medication.    Patient is poor historian and uncertain of what medications she is taking.        H&H 9.2/31.1, K 4.4, Cr. 7.2, BNP 1377  Serial Trop HS: 26.4, 24.5, 21.6  CXR: Negative  EKG: SB with nonspecific T wave abnormality    During exam, patient is in stress lab for nuclear stress. She developed hypotension, generalized weakness, and nausea during stress test. Patient was laid supine with improvement in BP.  Symptoms continued and patient was given aminophylline. Patient was feeling better post aminophylline. No acute EKG changes noted during stress.     STRESS RESULTS 3/21/23  IMPRESSION:     1.  NO FINDINGS OF  PHARMACOLOGICALLY INDUCED REVERSIBILITY.     2.  ESTIMATED EJECTION FRACTION OF 74%.    CARDIAC CATH 2/7/22  The left ventricular systolic function was normal.  The left ventricular end diastolic pressure was elevated.  The pre-procedure left ventricular end diastolic pressure was 20.  The post-procedure left ventricular end diastolic pressure was 31.  The ejection fraction was calculated to be 60%.  The estimated blood loss was none.  Left main is patent, lad patent moderate to severe tortuosity.  Left circumflex artery proximal stent is patent moderate to severe tortuosity, ramus intermedius is patent.  RCA is small caliber is patent moderate tortuosity. Non dominant vessel.    ECHO 3/7/23  The left ventricle is normal in size with normal systolic function.  The estimated ejection fraction is 60%.  Grade II left ventricular diastolic dysfunction.  Normal right ventricular size with normal right ventricular systolic function.  Moderate left atrial enlargement.  Mild-to-moderate mitral regurgitation.  Moderate to severe tricuspid regurgitation.  There is mild pulmonary hypertension.  There is mild aortic valve stenosis.  Aortic valve area is 1.78 cm2; peak velocity is 2.01 m/s; mean gradient is 9 mmHg.        FROM H&P:  HPI:  A 82-year-old female history of ESRD on HD via right upper extremity AVF Monday Wednesday Friday (Dr Valero), AFib on Cardizem and Coumadin, gout, hypertension.     Per chart review she is had several issues during dialysis.  In December 9, 2022 she had rapid palpitations and shortness of breath, AFib with RVR episode.  In February 24, 2023 she would episodes with hypotension and tachycardia, for which she was admitted, thought to be related to SVT.  Earlier this month March 6, 2023 she would episode of AFib with RVR during dialysis, for which she was admitted.     She presents today with chief complaint of chest pain.  Pain is described as tightness across her chest lasting for several minutes.   EMS gave nitroglycerin and 4 baby aspirin.  She is pain-free in the ER.  Reportedly has a history of MI 10 years ago.  Currently she is symptom free except for mild shortness of breath.  She recently started sotalol and states she has adverse reaction to this and does not want to take it any longer.     Patient to be admitted for evaluation and management of chest pain.     Ten point review systems otherwise negative.      Review of patient's allergies indicates:   Allergen Reactions    Cyclobenzaprine     Fish containing products Hives    Peanut Other (See Comments)    Tramadol Itching       Past Medical History:   Diagnosis Date    A-fib     Anxiety     Depression     Disorder of kidney and ureter     Encephalopathy acute 1/1/2018    End stage kidney disease 6/17/2017    Gout     Hyperlipidemia     Hypertension     Moderate episode of recurrent major depressive disorder 1/17/2018    Nephropathy hypertensive, 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    Osteopenia of multiple sites 3/9/2018    Based upon bone density measurements. Patient also has chronic kidney disease.    Stroke 11/2016     Past Surgical History:   Procedure Laterality Date    ANGIOGRAM, CORONARY, WITH LEFT HEART CATHETERIZATION N/A 2/7/2022    Procedure: Angiogram, Coronary, with Left Heart Cath;  Surgeon: Gino Leal MD;  Location: [facility] CATH/EP LAB;  Service: Cardiology;  Laterality: N/A;    CARDIAC SURGERY      stents    EYE SURGERY      WRIST SURGERY       Social History     Tobacco Use    Smoking status: Never    Smokeless tobacco: Never   Substance Use Topics    Alcohol use: No    Drug use: No        REVIEW OF SYSTEMS  CONSTITUTIONAL: Negative for chills, fatigue and fever.   EYES: No double vision, No blurred vision  NEURO: No headaches, No dizziness  RESPIRATORY: Negative for cough, shortness of breath and  wheezing.    CARDIOVASCULAR: Positive for chest pain. Negative for palpitations and leg swelling.   GI: Negative for abdominal pain, No melena, diarrhea, nausea and vomiting.   : Negative for dysuria and frequency, Negative for hematuria  SKIN: Negative for bruising, Negative for edema or discoloration noted.   ENDOCRINE: Negative for polyphagia, Negative for heat intolerance, Negative for cold intolerance  PSYCHIATRIC: Negative for depression, Negative for anxiety, Negative for memory loss  MUSCULOSKELETAL: Negative for neck pain, Negative for muscle weakness, Negative for back pain     OBJECTIVE     VITAL SIGNS (Most Recent)  Temp: 98.7 °F (37.1 °C) (03/21/23 0720)  Pulse: (!) 57 (03/21/23 0720)  Resp: 18 (03/21/23 0720)  BP: (!) 101/57 (03/21/23 0720)  SpO2: 97 % (03/21/23 0847)    VENTILATION STATUS  Resp: 18 (03/21/23 0720)  SpO2: 97 % (03/21/23 0847)       I & O (Last 24H):  Intake/Output Summary (Last 24 hours) at 3/21/2023 1037  Last data filed at 3/21/2023 0836  Gross per 24 hour   Intake 0 ml   Output --   Net 0 ml       WEIGHTS  Wt Readings from Last 3 Encounters:   03/21/23 0030 53.1 kg (117 lb)   03/20/23 1851 53.5 kg (118 lb)   03/07/23 0400 52.6 kg (115 lb 14.4 oz)   03/07/23 0300 52.6 kg (115 lb 14.4 oz)   03/06/23 0751 54.4 kg (120 lb)   02/24/23 2100 54.4 kg (119 lb 14.9 oz)   02/24/23 1054 52.6 kg (116 lb)       PHYSICAL EXAM  GENERAL: well built, well nourished, well-developed in no apparent distress alert and oriented.   HEENT: Normocephalic. Pupils normal and conjunctivae normal.    NECK: No JVD. No bruit..   THYROID: Thyroid not examined  CARDIAC: Regular rate and rhythm. Sinus bradycardia. S1 is normal.S2 is normal.2/6 systolic murmur  CHEST ANATOMY: normal.   LUNGS: Clear to auscultation. No wheezing or rhonchi..   ABDOMEN: Soft. Normal bowel sounds. Nontender  URINARY: No diaz catheter   EXTREMITIES: No cyanosis, clubbing or edema noted at this time.  PERIPHERAL VASCULAR SYSTEM: Good  palpable distal pulses.   CENTRAL NERVOUS SYSTEM: No focal motor or sensory deficits noted.   SKIN: Skin without lesions, moist, well perfused.   MUSCLE STRENGTH & TONE: No noteable weakness, atrophy or abnormal movement.     HOME MEDICATIONS:  No current facility-administered medications on file prior to encounter.     Current Outpatient Medications on File Prior to Encounter   Medication Sig Dispense Refill    atorvastatin (LIPITOR) 40 MG tablet Take 40 mg by mouth once daily.      b complex vitamins tablet Take 1 tablet by mouth once daily.      calcium acetate,phosphat bind, (PHOSLO) 667 mg tablet Take 667 mg by mouth 2 (two) times daily with meals.      diltiaZEM (CARDIZEM CD) 120 MG Cp24 Take 1 capsule (120 mg total) by mouth once daily.      docusate sodium (COLACE) 100 MG capsule Take 100 mg by mouth 3 (three) times a week.      dorzolamide-timolol 2-0.5% (COSOPT) 22.3-6.8 mg/mL ophthalmic solution Place 1 drop into both eyes 2 (two) times daily.       famotidine (PEPCID) 20 MG tablet Take 1 tablet (20 mg total) by mouth nightly as needed for Heartburn. 30 tablet 5    latanoprost 0.005 % ophthalmic solution Place 1 drop into both eyes every evening.       sotaloL (BETAPACE) 80 MG tablet Take 40 mg by mouth 2 (two) times daily.      warfarin (COUMADIN) 3 MG tablet Take 3 mg by mouth every Mon, Wed, Fri.      warfarin (COUMADIN) 3 MG tablet Take 1.5 mg by mouth every Tuesday, Thursday, Saturday, Sunday.      amiodarone (PACERONE) 200 MG Tab Take 1 tablet (200 mg total) by mouth 3 (three) times daily for 7 days, THEN 1 tablet (200 mg total) 2 (two) times daily for 23 days. 67 tablet 0    lidocaine 5 % Crea Apply 1 application topically every Mon, Wed, Fri.          SCHEDULED MEDS:   atorvastatin  40 mg Oral Daily    calcium acetate(phosphat bind)  667 mg Oral Daily    docusate sodium  100 mg Oral Once per day on Mon Wed Fri    famotidine  20 mg Oral Every other day       CONTINUOUS INFUSIONS:    PRN  MEDS:dextrose 10%, dextrose 10%, glucagon (human recombinant), glucose, glucose, naloxone, sodium chloride 0.9%    LABS AND DIAGNOSTICS     CBC LAST 3 DAYS  Recent Labs   Lab 03/20/23 1907 03/21/23  0345   WBC 6.26 4.86   RBC 3.28* 3.02*   HGB 10.2* 9.2*   HCT 33.0* 30.2*   * 100*   MCH 31.1* 30.5   MCHC 30.9* 30.5*   RDW 14.5 14.6*    234   MPV 9.6 9.4   GRAN 53.5  3.4 47.0  2.3   LYMPH 18.5  1.2 23.0  1.1   MONO 16.0*  1.0 15.4*  0.8   BASO 0.03 0.03   NRBC 0 0       COAGULATION LAST 3 DAYS  Recent Labs   Lab 03/20/23 1907 03/21/23  0345   INR 3.7* 3.6*       CHEMISTRY LAST 3 DAYS  Recent Labs   Lab 03/20/23 1907 03/21/23  0345    142   K 3.8 4.4   CL 94* 95   CO2 32* 32*   ANIONGAP 12 15   BUN 26* 33*   CREATININE 6.2* 7.2*   GLU 76 102   CALCIUM 7.9* 8.0*   ALBUMIN 3.5 3.2*   PROT 6.6 6.0   ALKPHOS 67 65   ALT 77* 62*   AST 49* 34   BILITOT 0.6 0.7       CARDIAC PROFILE LAST 3 DAYS  Recent Labs   Lab 03/20/23 1907 03/20/23 2106 03/21/23  0049   BNP 1,377*  --   --    TROPONINIHS 26.4* 24.5* 21.6*       ENDOCRINE LAST 3 DAYS  No results for input(s): TSH, PROCAL in the last 168 hours.    LAST ARTERIAL BLOOD GAS  ABG  No results for input(s): PH, PO2, PCO2, HCO3, BE in the last 168 hours.    LAST 7 DAYS MICROBIOLOGY   Microbiology Results (last 7 days)       ** No results found for the last 168 hours. **            MOST RECENT IMAGING  X-Ray Chest AP Portable  XR CHEST 1 VIEW    CLINICAL HISTORY:  82 years Female chest pain    COMPARISON: March 6, 2023    FINDINGS: Cardiac silhouette size is within normal limits. No airspace consolidation. No interstitial edema. No pleural effusion or pneumothorax. No acute osseous abnormality.    IMPRESSION:    No acute pulmonary pathology.    Electronically signed by:  Aleksandar Castillo MD  3/21/2023 6:51 AM CDT Workstation: [id number]      ECHOCARDIOGRAM RESULTS (last 5)  Results for orders placed during the hospital encounter of  03/06/23    Echo    Interpretation Summary  · The left ventricle is normal in size with normal systolic function.  · The estimated ejection fraction is 60%.  · Grade II left ventricular diastolic dysfunction.  · Normal right ventricular size with normal right ventricular systolic function.  · Moderate left atrial enlargement.  · Mild-to-moderate mitral regurgitation.  · Moderate to severe tricuspid regurgitation.  · There is mild pulmonary hypertension.  · There is mild aortic valve stenosis.  · Aortic valve area is 1.78 cm2; peak velocity is 2.01 m/s; mean gradient is 9 mmHg.      Results for orders placed during the hospital encounter of 01/26/22    Echo    Interpretation Summary  · The left ventricle is normal in size with mild concentric hypertrophy and normal systolic function.  · The estimated ejection fraction is 65%.  · Grade II left ventricular diastolic dysfunction.  · Atrial fibrillation not observed.  · Normal right ventricular size with normal right ventricular systolic function.  · Moderate to severe left atrial enlargement.  · Mild right atrial enlargement.  · Mild mitral regurgitation.  · Mild to moderate tricuspid regurgitation.  · Normal central venous pressure (3 mmHg).  · The estimated PA systolic pressure is 36 mmHg.  · Mildly elevated gradient accross mitral valve of around 6 mmHg at HR of 64 bpm. MVA by pressure half time is normal, however this can be inaccurate.      Results for orders placed during the hospital encounter of 03/03/21    Echo Color Flow Doppler? Yes    Interpretation Summary  · The left ventricle is small with concentric remodeling and normal systolic function. The estimated ejection fraction is 63%  · The estimated PA systolic pressure is 38 mmHg.  · Grade II left ventricular diastolic dysfunction.  · Normal right ventricular size with normal right ventricular systolic function.  · Mild-to-moderate mitral regurgitation.  · Mild to moderate tricuspid regurgitation.  · Normal  central venous pressure (3 mmHg).  · The patient converted from atrial fibrillation to normal sinus rhythm 03/03/2021      Results for orders placed during the hospital encounter of 12/13/19    Echo Color Flow Doppler? Yes    Interpretation Summary  · Mild concentric left ventricular hypertrophy.  · Normal left ventricular systolic function. The estimated ejection fraction is 70%  · Grade II (moderate) left ventricular diastolic dysfunction consistent with pseudonormalization.  · The estimated PA systolic pressure is 37 mm Hg  · No wall motion abnormalities.  · Normal right ventricular systolic function.  · Normal central venous pressure (3 mm Hg).  · Mild mitral sclerosis.  · Mild mitral regurgitation.  · Mild to moderate tricuspid regurgitation.      CURRENT/PREVIOUS VISIT EKG  Results for orders placed or performed during the hospital encounter of 03/06/23   EKG 12-lead    Collection Time: 03/20/23  6:49 PM    Narrative    Test Reason : I48.91,    Vent. Rate : 049 BPM     Atrial Rate : 049 BPM     P-R Int : 162 ms          QRS Dur : 076 ms      QT Int : 596 ms       P-R-T Axes : 100 -09 040 degrees     QTc Int : 538 ms    Sinus bradycardia  Nonspecific T wave abnormality  Abnormal ECG  When compared with ECG of 06-MAR-2023 07:52,  Sinus rhythm has replaced Atrial fibrillation  Vent. rate has decreased  BPM  ST no longer depressed in Lateral leads  T wave inversion now evident in Anterior leads  Nonspecific T wave abnormality has replaced inverted T waves in Lateral  leads    Referred By: [email or web address]   SELF           Confirmed By:            ASSESSMENT/PLAN:     Active Hospital Problems    Diagnosis    *Chest pain       ASSESSMENT & PLAN:   Chest Pain  Sinus Bradycardia  Paroxysmal Atrial Fibrillation- history of AFRVR on Sotalol and Diltiazem  ESRD on HD  Elevated INR      RECOMMENDATIONS:    Patient presented with chest pain during dialysis with associated hypotension and bradycardia.  Patient was recently  started on sotalol.  Trivial elevation in Troponin HS. No acute ST wave changes on EKG.   Patient is in sinus bradycardia with HR 50s during exam. Patient had stress test this am and became symptomatic with hypotension, generalized weakness and nausea. She was given aminophylline and had improvement with symptoms.   Nuclear stress test this am is negative for pharmacologically reversible ischemia.   ECHO 3/7/23 EF 60% with Grade II diastolic dysfunction.  Mild to moderate MR. Moderate to severe TR. Mild pulmonary HTN.   Continue atorvastatin. Recommend low sodium heart healthy diet.  Patient is bradycardic this admission with Hrs in 50s. Patient has history of AFRVR. Patient was recently discharged on Amiodarone and Diltizem and was later transitioned to Diltiazem and Sotalol. Diltiazem and Sotalol are being held at this time due to SB. Patient does not wish to be on Sotalol upon discharge as it causes her nausea and vomiting. Patient would benefit from being back on amiodarone. Recommend holding off on diltiazem in view of bradycardia.  BNP elevated 1377. Nephrology is following for ESRD and HD, Mon, Wed, and Fri.  Appreciate their recommendations and input.   Strict I's and O's. 1.5L fluid restriction. 2g low sodium heart healthy diet.   INR 3.6 this am. Recommend restarting Warfarin when improved. Serial INR. Monitor for bleeding.  Consider Eliquis instead of Warfarin to reduce drug interaction.   Thank you for the consultation. We will continue to follow.       Debbie Barney NP  Department of Cardiology  Date of Service: 03/21/2023        I have personally interviewed and examined the patient, I have reviewed the Nurse Practitioner's history and physical, assessment, and plan. I agree with the findings and plan.    Sotalol is not recommended for dialysis patients secondary to high risk of TORSADES DE POINTS.  Therefore don't recommend to continue it.    Patient stopped amiodarone on her own because she thought  it made her SOB.  Continue Cardizem cd 120 daily.   If she cannot tolerate amiodarone , may need pacemaker in future.    Ok to discharge.  F/U Dr Thompson.      Phillip Mcgregor M.D.  Department of Cardiology  Date of Service: 03/21/2023  10:37 AM

## 2023-03-21 NOTE — ASSESSMENT & PLAN NOTE
"Progress Notes by Beck Stinson PA-C at 8/11/2019 10:50 AM     Author: Beck Stinson PA-C Service: -- Author Type: Physician Assistant    Filed: 8/11/2019 11:32 AM Encounter Date: 8/11/2019 Status: Signed    : Beck Stinson PA-C (Physician Assistant)       Subjective:      Patient ID: Bret Fleming is a 69 y.o. male.    Chief Complaint:    HPI  Bret Fleming is a 69 y.o. 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  She states that the facial pain appears to be intermittent.  It is quite significant however.    Patient shares that he just recently had a dental appointment 3 weeks ago\" it did not have any problems or findings.\"     He does have a complicated cardiovascular history.  However currently he is denying fever chills night sweats, otorrhea or balance or hearing deficits.  He does not have cold-like symptoms respiratory or urinary symptoms.  He denies cardiac symptoms to include shortness of breath dyspnea on exertion dizziness syncope presyncope diaphoresis nausea vomiting left arm or chest pain.     Cardiovascular risk factors:    Male, 69  Positive hypertension, is on lisinopril and atenolol  Positive hyperlipidemia, patient is on atorvastatin  Positive former smoker   Negative alcohol use or illicit street drug use  Positive previous MI at age 50.  Positive sedentary lifestyle and obesity  Negative diabetes mellitus      History reviewed. No pertinent past medical history.    History reviewed. No pertinent surgical history.    No family history on file.    Social History     Tobacco Use   ? Smoking status: Former Smoker   ? Smokeless tobacco: Never Used   Substance Use Topics   ? Alcohol use: Not on " This is pts third admission to hospital since 02/25/23     file   ? Drug use: Not on file       Review of Systems  As above in HPI, otherwise balance of Review of Systems are negative.    Objective:     /76 (Patient Site: Right Arm, Patient Position: Sitting, Cuff Size: Adult Large)   Pulse 86   Temp 98  F (36.7  C) (Oral)   Resp 16   Wt (!) 269 lb 2 oz (122.1 kg)   SpO2 97%     Physical Exam  General: Patient is resting comfortably no acute distress is afebrile  HEENT: Head is normocephalic atraumatic   eyes are PERRL EOMI sclera anicteric   TMs are clear bilaterally  Throat is with mild pharyngeal wall erythema and no exudate  No cervical lymphadenopathy present  LUNGS: Clear to auscultation bilaterally  HEART: Regular rate and rhythm  Skin: Without rash non-diaphoretic    Lab:  Recent Results (from the past 24 hour(s))   Electrocardiogram Perform - Clinic   Result Value Ref Range    SYSTOLIC BLOOD PRESSURE  mmHg    DIASTOLIC BLOOD PRESSURE  mmHg    VENTRICULAR RATE 80 BPM    ATRIAL RATE 80 BPM    P-R INTERVAL 180 ms    QRS DURATION 88 ms    Q-T INTERVAL 376 ms    QTC CALCULATION (BEZET) 433 ms    P Axis 27 degrees    R AXIS -3 degrees    T AXIS 21 degrees    MUSE DIAGNOSIS       Normal sinus rhythm  Inferior infarct , age undetermined  Abnormal ECG  No previous ECGs available         Assessment:     Procedures    The primary encounter diagnosis was Jaw pain. Diagnoses of Essential hypertension, Hyperlipidemia, unspecified hyperlipidemia type, At risk for acute ischemic cardiac event, and Obesity, unspecified classification, unspecified obesity type, unspecified whether serious comorbidity present were also pertinent to this visit.    Plan:     1. Jaw pain  Electrocardiogram Perform - Clinic   2. Essential hypertension     3. Hyperlipidemia, unspecified hyperlipidemia type     4. At risk for acute ischemic cardiac event     5. Obesity, unspecified classification, unspecified obesity type, unspecified whether serious comorbidity present         Patient was seen  in a timely manner and addressed.  A EKG was obtained.  Multiple etiologies and diagnoses were considered chiefly among them to include acute coronary syndrome with atypical chest pain symptoms but he does have jaw pain.  Because of his cardiovascular risk factors and complicated cardiac history I do think he needs serial EKGs and troponin to rule out any cardiac cause.  If it is found that he does not have cardiac underlying symptoms then the face pain can be treated.  However, he did not exhibit classic signs of tic douloureux, trigeminal neuralgia or otitis media.  Additionally he had dental appointment 3 weeks ago to rule out any dental issues.  Because of the paucity of other potential differential diagnoses I did want him to have rule out for cardiac disease.  This was explained to the patient will present by personal vehicle.  I called and gave report to the ER attending physician.  Questions were answered patient satisfaction for discharge.    Patient Instructions   Go to the ER for evaluation of your jaw and face pain      Patient Education     Uncertain Causes of Chest Pain    Chest pain can happen for a number of reasons. Sometimes the cause can't be determined. If your condition does not seem serious, and your pain does not appear to be coming from your heart, your healthcare provider may recommend watching it closely. Sometimes the signs of a serious problem take more time to appear. Many problems not related to your heart can cause chest pain.These include:    Musculoskeletal. Costochondritis, an inflammation of the tissues around the ribs that can occur from trauma or overuse injuries    Respiratory. Pneumonia, pneumothorax, or pneumonitis (inflammation of the lining of the chest and lungs)    Gastrointestinal. Esophageal reflux, heartburn, or gallbladder disease    Anxiety and panic disorders    Nerve compression and neuritis    Miscellaneous problems such as aortic aneurysm or pulmonary embolism (a  blood clot in the lungs)  Home care  After your visit, follow these recommendations:    Rest today and avoid strenuous activity.    Take any prescribed medicine as directed.    Be aware of any recurrent chest pain and notice any changes  Follow-up care  Follow up with your healthcare provider if you do not start to feel better within 24 hours, or as advised.  Call 911  Call 911 if any of these occur:    A change in the type of pain: if it feels different, becomes more severe, lasts longer, or begins to spread into your shoulder, arm, neck, jaw or back    Shortness of breath or increased pain with breathing    Weakness, dizziness, or fainting    Rapid heart beat    Crushing sensation in your chest  When to seek medical advice  Call your healthcare provider right away if any of the following occur:    Cough with dark colored sputum (phlegm) or blood    Fever of 100.4 F (38 C) or higher, or as directed by your healthcare provider    Swelling, pain or redness in one leg    Shortness of breath  Date Last Reviewed: 12/30/2015 2000-2017 [employer]. [address] [place] [address]. All rights reserved. This information is not intended as a substitute for professional medical care. Always follow your healthcare professional's instructions.

## 2023-03-21 NOTE — DISCHARGE SUMMARY
[facility] Medicine  Discharge Summary      Patient Name: Tyra Isaac  MRN: [id number]  ROMMEL: [id number]  Patient Class: OP- Observation  Admission Date: 3/20/2023  Hospital Length of Stay: 0 days  Discharge Date and Time:  03/21/2023 4:36 PM  Attending Physician: Ousmane Mcgregor MD   Discharging Provider: Ousmane Mcgregor MD  Primary Care Provider: Kalpesh Goel MD    Primary Care Team: Networked reference to record PCT     HPI:   No notes on file    * No surgery found *      Hospital Course:   Pt got admitted with chest pain  Pt was evaluated by Cardiology and had stress test  Stress test done was normal  This is pts Third IP admission since 02/25/23  Pt suffered from SVT/A Fib RVR before whenever she had hypotension soon after Dialysis  Pt stopped taking sotalol and amiodarone because of side effects  Pt was instructed to take cardizem and educated not to skip it  Pt will follow up with Regular Cardiology MD(Dr Thompson ) on outpatient basis            Goals of Care Treatment Preferences:  Code Status: Full Code      Consults:   Consults (From admission, onward)        Status Ordering Provider     Inpatient consult to Cardiology  Once        Provider:  Gino Leal MD    Completed BUCKY DAVISON     Inpatient consult to Nephrology  Once        Provider:  Jimenez Valero MD    Completed BUCKY DAVISON     Inpatient consult to Hospitalist  Once        Provider:  MD Caridad Servin URSIN T.          No new Assessment & Plan notes have been filed under this hospital service since the last note was generated.  Service: Hospital Medicine    Final Active Diagnoses:    Diagnosis Date Noted POA    CAD s/p PCI [I25.10] 12/09/2022 Yes     Chronic    End stage renal disease on dialysis [N18.6, Z99.2] 06/25/2021 Not Applicable      Problems Resolved During this Admission:    Diagnosis Date Noted Date Resolved POA    PRINCIPAL PROBLEM:  Chest pain [R07.9] 03/21/2023  03/21/2023 Unknown    Chest pain [R07.9] 02/03/2022 03/21/2023 Unknown    Atrial fibrillation with RVR with history of known paroxysmal atrial fibrillation [I48.91] 12/13/2019 03/21/2023 Yes    Bradycardia [R00.1] 03/21/2023 03/21/2023 Unknown    Frequent hospital admissions [Z78.9] 03/21/2023 03/21/2023 Unknown       Discharged Condition: good    Disposition: Home or Self Care    Follow Up:    Patient Instructions:   No discharge procedures on file.    Significant Diagnostic Studies: Labs:   CMP   Recent Labs   Lab 03/20/23 1907 03/21/23  0345    142   K 3.8 4.4   CL 94* 95   CO2 32* 32*   GLU 76 102   BUN 26* 33*   CREATININE 6.2* 7.2*   CALCIUM 7.9* 8.0*   PROT 6.6 6.0   ALBUMIN 3.5 3.2*   BILITOT 0.6 0.7   ALKPHOS 67 65   AST 49* 34   ALT 77* 62*   ANIONGAP 12 15    and CBC   Recent Labs   Lab 03/20/23 1907 03/21/23  0345   WBC 6.26 4.86   HGB 10.2* 9.2*   HCT 33.0* 30.2*    234       Pending Diagnostic Studies:     Procedure Component Value Units Date/Time    Echo [[id number]]  (Abnormal) Resulted: 03/21/23 1221    Order Status: Sent Lab Status: In process Updated: 03/21/23 1222     BSA 1.56 m2      IVC diameter 2.19 cm      RV mid diameter 26.40 cm      LVIDd 3.78 cm      IVS 1.43 cm      Posterior Wall 1.45 cm      LVIDs 2.67 cm      FS 29 %      LV mass 201.44 g      TAPSE 2.33 cm      Left Ventricle Relative Wall Thickness 0.77 cm      MV mean gradient 4 mmHg      MV valve area p 1/2 method 1.85 cm2      E/A ratio 1.67     E wave deceleration time 282.00 msec      MV peak gradient 12 mmHg      MV Peak E Romeo 1.72 m/s      TR Max Romeo 3.06 m/s      MV VTI 55.4 cm      MV stenosis pressure 1/2 time 119.00 ms      MV Peak A Romeo 1.03 m/s      LV Systolic Volume 20.20 mL      LV Systolic Volume Index 12.8 mL/m2      LV Diastolic Volume 61.70 mL      LV Diastolic Volume Index 39.05 mL/m2      LV Mass Index 127 g/m2      Triscuspid Valve Regurgitation Peak Gradient 37 mmHg     Narrative:      ·  Concentric hypertrophy and       Impression:               Medications:  Reconciled Home Medications:      Medication List      CONTINUE taking these medications    atorvastatin 40 MG tablet  Commonly known as: LIPITOR  Take 40 mg by mouth once daily.     b complex vitamins tablet  Take 1 tablet by mouth once daily.     calcium acetate(phosphat bind) 667 mg tablet  Commonly known as: PHOSLO  Take 667 mg by mouth 2 (two) times daily with meals.     diltiaZEM 120 MG Cp24  Commonly known as: CARDIZEM CD  Take 1 capsule (120 mg total) by mouth once daily.     docusate sodium 100 MG capsule  Commonly known as: COLACE  Take 100 mg by mouth 3 (three) times a week.     dorzolamide-timolol 2-0.5% 22.3-6.8 mg/mL ophthalmic solution  Commonly known as: COSOPT  Place 1 drop into both eyes 2 (two) times daily.     famotidine 20 MG tablet  Commonly known as: PEPCID  Take 1 tablet (20 mg total) by mouth nightly as needed for Heartburn.     latanoprost 0.005 % ophthalmic solution  Place 1 drop into both eyes every evening.     LIDOcaine 5 % Crea  Apply 1 application topically every Mon, Wed, Fri.     * warfarin 3 MG tablet  Commonly known as: COUMADIN  Take 3 mg by mouth every Mon, Wed, Fri.     * warfarin 3 MG tablet  Commonly known as: COUMADIN  Take 1.5 mg by mouth every Tuesday, Thursday, Saturday, Sunday.         * This list has 2 medication(s) that are the same as other medications prescribed for you. Read the directions carefully, and ask your doctor or other care provider to review them with you.            STOP taking these medications    amiodarone 200 MG Tab  Commonly known as: PACERONE     sotaloL 80 MG tablet  Commonly known as: BETAPACE            Indwelling Lines/Drains at time of discharge:   Lines/Drains/Airways     Drain  Duration                Hemodialysis AV Fistula Right upper arm -- days              Physical Exam   Cardiovascular: Normal rate.   Neurological: She is alert.     Time spent on the discharge of  patient: 34 minutes         Ousmane Mcgregor MD  Department of Hospital Medicine  [facility]

## 2023-03-21 NOTE — PLAN OF CARE
03/21/23 1537   Discharge Reassessment   Assessment Type Discharge Planning Reassessment   Did the patient's condition or plan change since previous assessment? Yes   Discharge Plan discussed with: Patient   Communicated ILAN with patient/caregiver Yes   Post-Acute Status   Post-Acute Authorization Dialysis   Diaylsis Status Discharge Plan Changed     Cardiology wants to observe patient overnight. Case management notified Mdadie that patient will receive her scheduled treatment in house at [facility] on tomorrow. Patient to return to scheduled OP chair time as scheduled on Friday.

## 2023-03-22 NOTE — PLAN OF CARE
03/22/23 0822   Final Note   Assessment Type Final Discharge Note   Anticipated Discharge Disposition Home     Patient discharged after case management hours.     Patient cleared for discharge from case management. Patient discharged to home.

## 2023-03-29 ENCOUNTER — PATIENT MESSAGE (OUTPATIENT)
Dept: FAMILY MEDICINE | Facility: CLINIC | Age: 83
End: 2023-03-29

## 2023-03-29 ENCOUNTER — OFFICE VISIT (OUTPATIENT)
Dept: FAMILY MEDICINE | Facility: CLINIC | Age: 83
End: 2023-03-29
Payer: MEDICARE

## 2023-03-29 VITALS
HEART RATE: 52 BPM | DIASTOLIC BLOOD PRESSURE: 62 MMHG | HEIGHT: 65 IN | BODY MASS INDEX: 19.49 KG/M2 | WEIGHT: 117 LBS | SYSTOLIC BLOOD PRESSURE: 143 MMHG

## 2023-03-29 DIAGNOSIS — I12.0 BENIGN HYPERTENSION WITH ESRD (END-STAGE RENAL DISEASE): ICD-10-CM

## 2023-03-29 DIAGNOSIS — Z79.01 CHRONIC ANTICOAGULATION: ICD-10-CM

## 2023-03-29 DIAGNOSIS — E78.2 MULTIPLE-TYPE HYPERLIPIDEMIA: ICD-10-CM

## 2023-03-29 DIAGNOSIS — Z59.82 TRANSPORTATION INSECURITY DUE TO LACK OF ACCESS TO VEHICLE: ICD-10-CM

## 2023-03-29 DIAGNOSIS — I25.118 CORONARY ARTERY DISEASE OF NATIVE HEART WITH STABLE ANGINA PECTORIS, UNSPECIFIED VESSEL OR LESION TYPE: ICD-10-CM

## 2023-03-29 DIAGNOSIS — M54.2 NECK PAIN: ICD-10-CM

## 2023-03-29 DIAGNOSIS — Z91.89 AT RISK FOR POLYPHARMACY: Chronic | ICD-10-CM

## 2023-03-29 DIAGNOSIS — M25.522 LEFT ELBOW PAIN: ICD-10-CM

## 2023-03-29 DIAGNOSIS — G31.84 MILD COGNITIVE IMPAIRMENT: ICD-10-CM
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
## 2023-03-29 DIAGNOSIS — N18.6 BENIGN HYPERTENSION WITH ESRD (END-STAGE RENAL DISEASE): ICD-10-CM

## 2023-03-29 DIAGNOSIS — N18.6 STAGE 5 CHRONIC KIDNEY DISEASE ON CHRONIC DIALYSIS: ICD-10-CM

## 2023-03-29 DIAGNOSIS — Z99.2 STAGE 5 CHRONIC KIDNEY DISEASE ON CHRONIC DIALYSIS: ICD-10-CM

## 2023-03-29 DIAGNOSIS — I48.0 PAROXYSMAL ATRIAL FIBRILLATION: ICD-10-CM

## 2023-03-29 PROCEDURE — 1159F PR MEDICATION LIST DOCUMENTED IN MEDICAL RECORD: ICD-10-PCS | Mod: CPTII,S$GLB,, | Performed by: INTERNAL MEDICINE

## 2023-03-29 PROCEDURE — 1160F PR REVIEW ALL MEDS BY PRESCRIBER/CLIN PHARMACIST DOCUMENTED: ICD-10-PCS | Mod: CPTII,S$GLB,, | Performed by: INTERNAL MEDICINE

## 2023-03-29 PROCEDURE — 1160F RVW MEDS BY RX/DR IN RCRD: CPT | Mod: CPTII,S$GLB,, | Performed by: INTERNAL MEDICINE

## 2023-03-29 PROCEDURE — 99214 OFFICE O/P EST MOD 30 MIN: CPT | Mod: S$GLB,,, | Performed by: INTERNAL MEDICINE

## 2023-03-29 PROCEDURE — 3288F PR FALLS RISK ASSESSMENT DOCUMENTED: ICD-10-PCS | Mod: CPTII,S$GLB,, | Performed by: INTERNAL MEDICINE

## 2023-03-29 PROCEDURE — 3288F FALL RISK ASSESSMENT DOCD: CPT | Mod: CPTII,S$GLB,, | Performed by: INTERNAL MEDICINE

## 2023-03-29 PROCEDURE — 99214 PR OFFICE/OUTPT VISIT, EST, LEVL IV, 30-39 MIN: ICD-10-PCS | Mod: S$GLB,,, | Performed by: INTERNAL MEDICINE

## 2023-03-29 PROCEDURE — 3078F PR MOST RECENT DIASTOLIC BLOOD PRESSURE < 80 MM HG: ICD-10-PCS | Mod: CPTII,S$GLB,, | Performed by: INTERNAL MEDICINE

## 2023-03-29 PROCEDURE — 3078F DIAST BP <80 MM HG: CPT | Mod: CPTII,S$GLB,, | Performed by: INTERNAL MEDICINE

## 2023-03-29 PROCEDURE — 3077F SYST BP >= 140 MM HG: CPT | Mod: CPTII,S$GLB,, | Performed by: INTERNAL MEDICINE

## 2023-03-29 PROCEDURE — 1100F PTFALLS ASSESS-DOCD GE2>/YR: CPT | Mod: CPTII,S$GLB,, | Performed by: INTERNAL MEDICINE

## 2023-03-29 PROCEDURE — 3077F PR MOST RECENT SYSTOLIC BLOOD PRESSURE >= 140 MM HG: ICD-10-PCS | Mod: CPTII,S$GLB,, | Performed by: INTERNAL MEDICINE

## 2023-03-29 PROCEDURE — 1159F MED LIST DOCD IN RCRD: CPT | Mod: CPTII,S$GLB,, | Performed by: INTERNAL MEDICINE

## 2023-03-29 PROCEDURE — 1100F PR PT FALLS ASSESS DOC 2+ FALLS/FALL W/INJURY/YR: ICD-10-PCS | Mod: CPTII,S$GLB,, | Performed by: INTERNAL MEDICINE

## 2023-03-29 RX ORDER — AMIODARONE HYDROCHLORIDE 100 MG/1
TABLET ORAL DAILY
Status: ON HOLD | COMMUNITY
End: 2023-06-29 | Stop reason: HOSPADM

## 2023-03-29 RX ORDER — AMLODIPINE BESYLATE 10 MG/1
10 TABLET ORAL DAILY
COMMUNITY
End: 2023-03-29 | Stop reason: ALTCHOICE

## 2023-03-29 RX ORDER — SOTALOL HYDROCHLORIDE 80 MG/1
TABLET ORAL
COMMUNITY
Start: 2023-03-15 | End: 2023-03-29 | Stop reason: ALTCHOICE

## 2023-03-29 SDOH — SOCIAL DETERMINANTS OF HEALTH (SDOH): TRANSPORTATION INSECURITY: Z59.82
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
## 2023-03-31 ENCOUNTER — HOSPITAL ENCOUNTER (OUTPATIENT)
Facility: HOSPITAL | Age: 83
Discharge: HOME | End: 2023-04-01
Attending: EMERGENCY MEDICINE | Admitting: STUDENT IN AN ORGANIZED HEALTH CARE EDUCATION/TRAINING PROGRAM
Payer: MEDICARE

## 2023-03-31 DIAGNOSIS — N18.9 CHRONIC KIDNEY DISEASE, UNSPECIFIED CKD STAGE: Primary | ICD-10-CM

## 2023-03-31 DIAGNOSIS — Z99.2 DIALYSIS PATIENT: ICD-10-CM

## 2023-03-31 DIAGNOSIS — N18.6 ESRD (END STAGE RENAL DISEASE): ICD-10-CM

## 2023-03-31 DIAGNOSIS — N18.6 END STAGE RENAL DISEASE ON DIALYSIS: ICD-10-CM

## 2023-03-31 DIAGNOSIS — Z99.2 ADMISSION FOR DIALYSIS: ICD-10-CM

## 2023-03-31 DIAGNOSIS — Z99.2 END STAGE RENAL DISEASE ON DIALYSIS: ICD-10-CM

## 2023-03-31 LAB
ALBUMIN SERPL BCP-MCNC: 3.8 G/DL (ref 3.5–5.2)
ALP SERPL-CCNC: 64 U/L (ref 55–135)
ALT SERPL W/O P-5'-P-CCNC: 30 U/L (ref 10–44)
ANION GAP SERPL CALC-SCNC: 15 MMOL/L (ref 8–16)
AST SERPL-CCNC: 26 U/L (ref 10–40)
BACTERIA #/AREA URNS HPF: NEGATIVE /HPF
BASOPHILS # BLD AUTO: 0.04 K/UL (ref 0–0.2)
BASOPHILS NFR BLD: 0.6 % (ref 0–1.9)
BILIRUB SERPL-MCNC: 0.7 MG/DL (ref 0.1–1)
BILIRUB UR QL STRIP: NEGATIVE
BUN SERPL-MCNC: 68 MG/DL (ref 8–23)
CALCIUM SERPL-MCNC: 8.9 MG/DL (ref 8.7–10.5)
CHLORIDE SERPL-SCNC: 100 MMOL/L (ref 95–110)
CLARITY UR: CLEAR
CO2 SERPL-SCNC: 29 MMOL/L (ref 23–29)
COLOR UR: YELLOW
CREAT SERPL-MCNC: 12.5 MG/DL (ref 0.5–1.4)
DIFFERENTIAL METHOD: ABNORMAL
EOSINOPHIL # BLD AUTO: 0.7 K/UL (ref 0–0.5)
EOSINOPHIL NFR BLD: 10.9 % (ref 0–8)
ERYTHROCYTE [DISTWIDTH] IN BLOOD BY AUTOMATED COUNT: 14.1 % (ref 11.5–14.5)
EST. GFR  (NO RACE VARIABLE): 2.7 ML/MIN/1.73 M^2
GLUCOSE SERPL-MCNC: 100 MG/DL (ref 70–110)
GLUCOSE UR QL STRIP: NEGATIVE
HCT VFR BLD AUTO: 32.2 % (ref 37–48.5)
HGB BLD-MCNC: 10 G/DL (ref 12–16)
HGB UR QL STRIP: NEGATIVE
HYALINE CASTS #/AREA URNS LPF: 1 /LPF
IMM GRANULOCYTES # BLD AUTO: 0.02 K/UL (ref 0–0.04)
IMM GRANULOCYTES NFR BLD AUTO: 0.3 % (ref 0–0.5)
INR PPP: 2 (ref 0.8–1.2)
KETONES UR QL STRIP: NEGATIVE
LEUKOCYTE ESTERASE UR QL STRIP: NEGATIVE
LYMPHOCYTES # BLD AUTO: 1.2 K/UL (ref 1–4.8)
LYMPHOCYTES NFR BLD: 17.9 % (ref 18–48)
MAGNESIUM SERPL-MCNC: 2.5 MG/DL (ref 1.6–2.6)
MCH RBC QN AUTO: 31.1 PG (ref 27–31)
MCHC RBC AUTO-ENTMCNC: 31.1 G/DL (ref 32–36)
MCV RBC AUTO: 100 FL (ref 82–98)
MICROSCOPIC COMMENT: NORMAL
MONOCYTES # BLD AUTO: 1 K/UL (ref 0.3–1)
MONOCYTES NFR BLD: 15.5 % (ref 4–15)
NEUTROPHILS # BLD AUTO: 3.6 K/UL (ref 1.8–7.7)
NEUTROPHILS NFR BLD: 54.8 % (ref 38–73)
NITRITE UR QL STRIP: NEGATIVE
NRBC BLD-RTO: 0 /100 WBC
PH UR STRIP: 8 [PH] (ref 5–8)
PLATELET # BLD AUTO: 228 K/UL (ref 150–450)
PMV BLD AUTO: 9.6 FL (ref 9.2–12.9)
POTASSIUM SERPL-SCNC: 4.5 MMOL/L (ref 3.5–5.1)
PROT SERPL-MCNC: 6.7 G/DL (ref 6–8.4)
PROT UR QL STRIP: ABNORMAL
PROTHROMBIN TIME: 21 SEC (ref 9–12.5)
RBC # BLD AUTO: 3.22 M/UL (ref 4–5.4)
RBC #/AREA URNS HPF: 0 /HPF (ref 0–4)
SODIUM SERPL-SCNC: 144 MMOL/L (ref 136–145)
SP GR UR STRIP: 1.01 (ref 1–1.03)
SQUAMOUS #/AREA URNS HPF: 0 /HPF
URN SPEC COLLECT METH UR: ABNORMAL
UROBILINOGEN UR STRIP-ACNC: NEGATIVE EU/DL
WBC # BLD AUTO: 6.52 K/UL (ref 3.9–12.7)
WBC #/AREA URNS HPF: 0 /HPF (ref 0–5)

## 2023-03-31 PROCEDURE — 93010 ELECTROCARDIOGRAM REPORT: CPT | Mod: ,,, | Performed by: INTERNAL MEDICINE

## 2023-03-31 PROCEDURE — G0378 HOSPITAL OBSERVATION PER HR: HCPCS

## 2023-03-31 PROCEDURE — 99285 EMERGENCY DEPT VISIT HI MDM: CPT | Mod: 25

## 2023-03-31 PROCEDURE — 63600175 PHARM REV CODE 636 W HCPCS: Performed by: STUDENT IN AN ORGANIZED HEALTH CARE EDUCATION/TRAINING PROGRAM

## 2023-03-31 PROCEDURE — 94761 N-INVAS EAR/PLS OXIMETRY MLT: CPT

## 2023-03-31 PROCEDURE — 80053 COMPREHEN METABOLIC PANEL: CPT | Performed by: NURSE PRACTITIONER

## 2023-03-31 PROCEDURE — 93010 EKG 12-LEAD: ICD-10-PCS | Mod: ,,, | Performed by: INTERNAL MEDICINE

## 2023-03-31 PROCEDURE — 85610 PROTHROMBIN TIME: CPT | Performed by: NURSE PRACTITIONER

## 2023-03-31 PROCEDURE — 81001 URINALYSIS AUTO W/SCOPE: CPT | Performed by: NURSE PRACTITIONER

## 2023-03-31 PROCEDURE — 85025 COMPLETE CBC W/AUTO DIFF WBC: CPT | Performed by: NURSE PRACTITIONER

## 2023-03-31 PROCEDURE — 90935 HEMODIALYSIS ONE EVALUATION: CPT

## 2023-03-31 PROCEDURE — 83735 ASSAY OF MAGNESIUM: CPT | Performed by: NURSE PRACTITIONER

## 2023-03-31 PROCEDURE — 96374 THER/PROPH/DIAG INJ IV PUSH: CPT | Mod: 59

## 2023-03-31 PROCEDURE — 93005 ELECTROCARDIOGRAM TRACING: CPT | Mod: 59 | Performed by: INTERNAL MEDICINE

## 2023-03-31 PROCEDURE — 25000003 PHARM REV CODE 250: Performed by: STUDENT IN AN ORGANIZED HEALTH CARE EDUCATION/TRAINING PROGRAM

## 2023-03-31 RX ORDER — DILTIAZEM HYDROCHLORIDE 120 MG/1
120 CAPSULE, COATED, EXTENDED RELEASE ORAL DAILY
Status: DISCONTINUED | OUTPATIENT
Start: 2023-03-31 | End: 2023-03-31

## 2023-03-31 RX ORDER — DORZOLAMIDE HYDROCHLORIDE AND TIMOLOL MALEATE 20; 5 MG/ML; MG/ML
1 SOLUTION/ DROPS OPHTHALMIC 2 TIMES DAILY
Status: DISCONTINUED | OUTPATIENT
Start: 2023-03-31 | End: 2023-04-01 | Stop reason: HOSPADM

## 2023-03-31 RX ORDER — DOCUSATE SODIUM 100 MG/1
100 CAPSULE, LIQUID FILLED ORAL
Status: DISCONTINUED | OUTPATIENT
Start: 2023-03-31 | End: 2023-04-01 | Stop reason: HOSPADM

## 2023-03-31 RX ORDER — SODIUM CHLORIDE 9 MG/ML
INJECTION, SOLUTION INTRAVENOUS ONCE
Status: DISCONTINUED | OUTPATIENT
Start: 2023-03-31 | End: 2023-03-31

## 2023-03-31 RX ORDER — HEPARIN SODIUM 5000 [USP'U]/ML
5000 INJECTION, SOLUTION INTRAVENOUS; SUBCUTANEOUS
Status: DISCONTINUED | OUTPATIENT
Start: 2023-03-31 | End: 2023-04-01 | Stop reason: HOSPADM

## 2023-03-31 RX ORDER — DILTIAZEM HYDROCHLORIDE 180 MG/1
180 CAPSULE, COATED, EXTENDED RELEASE ORAL 2 TIMES DAILY
Status: DISCONTINUED | OUTPATIENT
Start: 2023-03-31 | End: 2023-04-01 | Stop reason: HOSPADM

## 2023-03-31 RX ORDER — SODIUM CHLORIDE 9 MG/ML
INJECTION, SOLUTION INTRAVENOUS
Status: DISCONTINUED | OUTPATIENT
Start: 2023-03-31 | End: 2023-04-01 | Stop reason: HOSPADM

## 2023-03-31 RX ORDER — ONDANSETRON 2 MG/ML
4 INJECTION INTRAMUSCULAR; INTRAVENOUS EVERY 8 HOURS PRN
Status: DISCONTINUED | OUTPATIENT
Start: 2023-03-31 | End: 2023-04-01 | Stop reason: HOSPADM

## 2023-03-31 RX ORDER — SODIUM CHLORIDE 0.9 % (FLUSH) 0.9 %
10 SYRINGE (ML) INJECTION
Status: DISCONTINUED | OUTPATIENT
Start: 2023-03-31 | End: 2023-04-01 | Stop reason: HOSPADM

## 2023-03-31 RX ORDER — LATANOPROST 50 UG/ML
1 SOLUTION/ DROPS OPHTHALMIC NIGHTLY
Status: DISCONTINUED | OUTPATIENT
Start: 2023-03-31 | End: 2023-04-01 | Stop reason: HOSPADM

## 2023-03-31 RX ORDER — CALCIUM ACETATE 667 MG/1
1334 CAPSULE ORAL
Status: DISCONTINUED | OUTPATIENT
Start: 2023-03-31 | End: 2023-04-01 | Stop reason: HOSPADM

## 2023-03-31 RX ORDER — ACETAMINOPHEN 325 MG/1
650 TABLET ORAL EVERY 8 HOURS PRN
Status: DISCONTINUED | OUTPATIENT
Start: 2023-03-31 | End: 2023-04-01 | Stop reason: HOSPADM

## 2023-03-31 RX ORDER — FAMOTIDINE 20 MG/1
20 TABLET, FILM COATED ORAL
Status: DISCONTINUED | OUTPATIENT
Start: 2023-03-31 | End: 2023-04-01 | Stop reason: HOSPADM

## 2023-03-31 RX ORDER — ATORVASTATIN CALCIUM 40 MG/1
40 TABLET, FILM COATED ORAL DAILY
Status: DISCONTINUED | OUTPATIENT
Start: 2023-03-31 | End: 2023-04-01 | Stop reason: HOSPADM

## 2023-03-31 RX ORDER — TALC
6 POWDER (GRAM) TOPICAL NIGHTLY PRN
Status: DISCONTINUED | OUTPATIENT
Start: 2023-03-31 | End: 2023-04-01 | Stop reason: HOSPADM

## 2023-03-31 RX ORDER — DILTIAZEM HYDROCHLORIDE 120 MG/1
180 CAPSULE, COATED, EXTENDED RELEASE ORAL 2 TIMES DAILY
Status: ON HOLD | COMMUNITY
End: 2023-06-29 | Stop reason: HOSPADM

## 2023-03-31 RX ORDER — WARFARIN 1 MG/1
3 TABLET ORAL
Status: DISCONTINUED | OUTPATIENT
Start: 2023-03-31 | End: 2023-04-01 | Stop reason: HOSPADM

## 2023-03-31 RX ORDER — MUPIROCIN 20 MG/G
OINTMENT TOPICAL 2 TIMES DAILY
Status: DISCONTINUED | OUTPATIENT
Start: 2023-03-31 | End: 2023-04-01 | Stop reason: HOSPADM

## 2023-03-31 RX ADMIN — WARFARIN SODIUM 3 MG: 1 TABLET ORAL at 05:03

## 2023-03-31 RX ADMIN — DILTIAZEM HYDROCHLORIDE 180 MG: 180 CAPSULE, COATED, EXTENDED RELEASE ORAL at 09:03

## 2023-03-31 RX ADMIN — ONDANSETRON 4 MG: 2 INJECTION INTRAMUSCULAR; INTRAVENOUS at 06:03

## 2023-03-31 RX ADMIN — LATANOPROST 1 DROP: 50 SOLUTION OPHTHALMIC at 09:03

## 2023-03-31 RX ADMIN — CALCIUM ACETATE 1334 MG: 667 CAPSULE ORAL at 05:03

## 2023-03-31 RX ADMIN — MUPIROCIN 1 G: 20 OINTMENT TOPICAL at 09:03

## 2023-03-31 RX ADMIN — DORZOLAMIDE HYDROCHLORIDE AND TIMOLOL MALEATE 1 DROP: 22.3; 6.8 SOLUTION/ DROPS OPHTHALMIC at 09:03

## 2023-03-31 NOTE — NURSING
Patient arrived to floor today at 1615 - alert and oriented - came straight from dialysis - removed 1.6 liters today - patient is scheduled for imaging of her elbow - patient can ambulate to the bathroom - stand-by assist - vital signs within normal parameters - no complaints of pain at this time - no visible signs or symptoms of distress - will continue to monitor throughout shift for any changes in condition - SOILA Horowitz RN

## 2023-03-31 NOTE — ED PROVIDER NOTES
Encounter Date: 3/31/2023       History     Chief Complaint   Patient presents with    Fatigue     C/o fatigue. Pt states missed dialysis wed & today due to no ride.      Tyra Isaac is an 82 year old female with pmh atrial fibrillation, anxiety, depression, gout, hyperlipidemia, HTN, CKD on dialysis presenting to the ED with c/o generalized weakness and fatigue. She is scheduled for dialysis on MWF and was unable to attend two days ago and today due to not having a ride. She has had no chest pain, SOB, upper respiratory symptoms, fever, urinary symptoms, vomiting/diarrhea.     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    Osteopenia of multiple sites 3/9/2018    Based upon bone density measurements. Patient also has chronic kidney disease.    Stroke 11/2016     Past Surgical History:   Procedure Laterality Date    ANGIOGRAM, CORONARY, WITH LEFT HEART CATHETERIZATION N/A 2/7/2022    Procedure: Angiogram, Coronary, with Left Heart Cath;  Surgeon: Gino Leal MD;  Location: [facility] CATH/EP LAB;  Service: Cardiology;  Laterality: N/A;    CARDIAC SURGERY      stents    EYE SURGERY      WRIST SURGERY       Family History   Problem Relation Age of Onset    Heart disease Mother     Cancer Father      Social History     Tobacco Use    Smoking status: Never    Smokeless tobacco: Never   Substance Use  Topics    Alcohol use: No    Drug use: No     Review of Systems   Constitutional:  Positive for fatigue. Negative for fever.   HENT:  Negative for sore throat.    Respiratory:  Negative for shortness of breath.    Cardiovascular:  Negative for chest pain.   Gastrointestinal:  Negative for diarrhea, nausea and vomiting.   Genitourinary:  Negative for dysuria, frequency and urgency.   Musculoskeletal:  Negative for back pain.   Skin:  Negative for rash.   Neurological:  Positive for weakness and light-headedness.   Hematological:  Does not bruise/bleed easily.     Physical Exam     Initial Vitals [03/31/23 0828]   BP Pulse Resp Temp SpO2   (!) 171/74 (!) 53 19 98 °F (36.7 °C) 98 %      MAP       --         Physical Exam    Nursing note and vitals reviewed.  Constitutional: She appears well-developed and well-nourished. She is not diaphoretic. No distress.   HENT:   Head: Normocephalic and atraumatic.   Mouth/Throat: Oropharynx is clear and moist.   Eyes: Conjunctivae are normal.   Neck: Neck supple.   Cardiovascular:  Regular rhythm, normal heart sounds and intact distal pulses.   Bradycardia present.   Exam reveals no gallop and no friction rub.       No murmur heard.  Pulmonary/Chest: Breath sounds normal. No respiratory distress. She has no wheezes. She has no rhonchi. She has no rales.   Abdominal: Abdomen is soft. She exhibits no distension. There is no abdominal tenderness.   Musculoskeletal:         General: Normal range of motion.      Cervical back: Neck supple.     Neurological: She is alert and oriented to person, place, and time.   Skin: No rash noted. No erythema.   Psychiatric: Her speech is normal.       ED Course   Procedures  Labs Reviewed   CBC W/ AUTO DIFFERENTIAL - Abnormal; Notable for the following components:       Result Value    RBC 3.22 (*)     Hemoglobin 10.0 (*)     Hematocrit 32.2 (*)      (*)     MCH 31.1 (*)     MCHC 31.1 (*)     Eos # 0.7 (*)     Lymph % 17.9 (*)     Mono % 15.5  (*)     Eosinophil % 10.9 (*)     All other components within normal limits   COMPREHENSIVE METABOLIC PANEL - Abnormal; Notable for the following components:    BUN 68 (*)     Creatinine 12.5 (*)     eGFR 2.7 (*)     All other components within normal limits   URINALYSIS, REFLEX TO URINE CULTURE - Abnormal; Notable for the following components:    Protein, UA 1+ (*)     All other components within normal limits    Narrative:     Specimen Source->Urine   PROTIME-INR - Abnormal; Notable for the following components:    Prothrombin Time 21.0 (*)     INR 2.0 (*)     All other components within normal limits   MAGNESIUM   URINALYSIS MICROSCOPIC    Narrative:     Specimen Source->Urine        ECG Results              EKG 12-lead (In process)  Result time 03/31/23 08:59:19      In process by Interface, Lab In [facility] (03/31/23 08:59:19)                   Narrative:    Test Reason : Z99.2,    Vent. Rate : 056 BPM     Atrial Rate : 056 BPM     P-R Int : 174 ms          QRS Dur : 080 ms      QT Int : 530 ms       P-R-T Axes : 090 -06 040 degrees     QTc Int : 511 ms    Sinus bradycardia  Prolonged QT  Abnormal ECG  When compared with ECG of 20-MAR-2023 18:49,  Nonspecific T wave abnormality no longer evident in Lateral leads    Referred By: [email or web address]   SELF           Confirmed By:                                   Imaging Results    None          Medications   mupirocin 2 % ointment (has no administration in time range)   epoetin rosy-epbx injection 2,700 Units (has no administration in time range)   atorvastatin tablet 40 mg (has no administration in time range)   calcium acetate(phosphat bind) capsule 1,334 mg (has no administration in time range)   diltiaZEM 24 hr capsule 180 mg (has no administration in time range)   docusate sodium capsule 100 mg (has no administration in time range)   dorzolamide-timolol 2-0.5% ophthalmic solution 1 drop (has no administration in time range)   famotidine tablet 20 mg (has no  administration in time range)   latanoprost 0.005 % ophthalmic solution 1 drop (has no administration in time range)   warfarin (COUMADIN) tablet 3 mg (has no administration in time range)   warfarin (COUMADIN) split tablet 1.5 mg (has no administration in time range)   diltiaZEM 24 hr capsule 120 mg (has no administration in time range)   sodium chloride 0.9% flush 10 mL (has no administration in time range)   melatonin tablet 6 mg (has no administration in time range)   acetaminophen tablet 650 mg (has no administration in time range)   ondansetron injection 4 mg (has no administration in time range)   acetaminophen tablet 650 mg (has no administration in time range)           APC / Resident Notes:   MDM    Patient presents for emergent evaluation of acute fatigue, generalized weakness that poses a threat to life and/or bodily function.    In the ED patient found to have CKD. Admission required for dialysis  I ordered labs and personally reviewed them.  Labs significant for , creatinine 12.5, INR 2. H&H 10, 32  I ordered EKG and personally reviewed it.  EKG significant for sinus bradycardia, rate of 56.  Also reviewed by MD.     Admit MDM  I discussed the patient presentation labs, ekg, X-rays, CT findings with the consultant for Dr. Valero, hospital medicine (speciality).    The patient has no hyperkalemia. No emergent findings but patient will require admission for dialysis. I spoke with Dr. Valero who will place dialysis orders. Hospital medicine will admit.  No significant electrolyte derangement requiring emergent intervention.                    Clinical Impression:   Final diagnoses:  [Z99.2] Dialysis patient  [N18.9] Chronic kidney disease, unspecified CKD stage (Primary)  [Z99.2] Admission for dialysis               Yomaira Zheng NP  03/31/23 1006

## 2023-03-31 NOTE — CONSULTS
INPATIENT NEPHROLOGY CONSULT   [facility] NEPHROLOGY    Tyra Isaac  03/31/2023    Reason for consultation    esrd    Chief Complaint:   Chief Complaint   Patient presents with    Fatigue     C/o fatigue. Pt states missed dialysis wed & today due to no ride.           History of Present Illness:    Tyra Isaac is an 82 year old female with pmh atrial fibrillation, anxiety, depression, gout, hyperlipidemia, HTN, CKD on dialysis presenting to the ED with c/o generalized weakness and fatigue. She is scheduled for dialysis on MWF and was unable to attend two days ago and today due to not having a ride. She has had no chest pain, SOB, upper respiratory symptoms, fever, urinary symptoms, vomiting/diarrhea.           Plan of Care:       Assessment:     esrd  --continue dialysis per routine  --fluid restrict  --renal dose medication per routine  --continue outpt medication  --continue binders with meals    Anemia  --erythropoiesis stimulating agent with renal replacement therapy    Hyperphosphatemia  --renal diet  --continue binders    Hypertension  --uf with hd  --fluid restrict  --low salt diet  --continue home medication         Thank you for allowing us to participate in this patient's care. We will continue to follow.    Vital Signs:  Temp Readings from Last 3 Encounters:   03/31/23 98.2 °F (36.8 °C) (Oral)   03/21/23 98.5 °F (36.9 °C) (Oral)   03/07/23 98.5 °F (36.9 °C) (Oral)       Pulse Readings from Last 3 Encounters:   03/31/23 (!) 47   03/29/23 (!) 52   03/21/23 (!) 55       BP Readings from Last 3 Encounters:   03/31/23 (!) 125/44   03/29/23 (!) 143/62   03/21/23 (!) 111/52       Weight:  Wt Readings from Last 3 Encounters:   03/31/23 53.1 kg (117 lb)   03/29/23 53.1 kg (117 lb)   03/21/23 53.1 kg (117 lb)       Past Medical & Surgical History:  Past Medical History:   Diagnosis Date    A-fib     Anxiety     Depression     Disorder of kidney and ureter     Encephalopathy acute 1/1/2018    End stage  kidney disease 6/17/2017    Gout     Hyperlipidemia     Hypertension     Moderate episode of recurrent major depressive disorder 1/17/2018    Nephropathy hypertensive, 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    Osteopenia of multiple sites 3/9/2018    Based upon bone density measurements. Patient also has chronic kidney disease.    Stroke 11/2016       Past Surgical History:   Procedure Laterality Date    ANGIOGRAM, CORONARY, WITH LEFT HEART CATHETERIZATION N/A 2/7/2022    Procedure: Angiogram, Coronary, with Left Heart Cath;  Surgeon: Gino Leal MD;  Location: [facility] CATH/EP LAB;  Service: Cardiology;  Laterality: N/A;    CARDIAC SURGERY      stents    EYE SURGERY      WRIST SURGERY         Past Social History:  Social History     Socioeconomic History    Marital status:      Spouse name: Aria Isaac    Number of children: 3   Occupational History    Occupation: Not working   Tobacco Use    Smoking status: Never    Smokeless tobacco: Never   Substance and Sexual Activity    Alcohol use: No    Drug use: No    Sexual activity: Not Currently     Social Determinants of Health     Financial Resource Strain: Medium Risk    Difficulty of Paying Living Expenses: Somewhat hard   Food Insecurity: No Food Insecurity    Worried About Running Out of Food in the Last Year: Never true    Ran Out of Food in the Last Year: Never true   Transportation Needs: Unmet Transportation Needs    Lack of Transportation (Medical): Yes    Lack of Transportation (Non-Medical): No   Physical Activity: Inactive    Days of Exercise per Week: 0 days    Minutes of Exercise per Session: 0 min   Stress: Stress Concern Present    Feeling of Stress : To some extent   Social Connections: Moderately Isolated    Frequency of Communication with Friends and Family: Three times a week    Frequency of Social  Gatherings with Friends and Family: Three times a week    Attends [employer] Services: 1 to 4 times per year    Active Member of Clubs or Organizations: No    Attends Club or Organization Meetings: Never    Marital Status:    Housing Stability: Low Risk     Unable to Pay for Housing in the Last Year: No    Number of Places Lived in the Last Year: 1    Unstable Housing in the Last Year: No       Medications:  No current facility-administered medications on file prior to encounter.     Current Outpatient Medications on File Prior to Encounter   Medication Sig Dispense Refill    atorvastatin (LIPITOR) 40 MG tablet Take 40 mg by mouth once daily.      b complex vitamins tablet Take 1 tablet by mouth once daily.      calcium acetate,phosphat bind, (PHOSLO) 667 mg tablet Take 667 mg by mouth 2 (two) times daily with meals.      diltiaZEM (CARDIZEM CD) 120 MG Cp24 Take 180 mg by mouth 2 (two) times a day.      docusate sodium (COLACE) 100 MG capsule Take 100 mg by mouth 3 (three) times a week.      dorzolamide-timolol 2-0.5% (COSOPT) 22.3-6.8 mg/mL ophthalmic solution Place 1 drop into both eyes 2 (two) times daily.       famotidine (PEPCID) 20 MG tablet Take 1 tablet (20 mg total) by mouth nightly as needed for Heartburn. 30 tablet 5    latanoprost 0.005 % ophthalmic solution Place 1 drop into both eyes every evening.       warfarin (COUMADIN) 3 MG tablet Take 3 mg by mouth every Mon, Wed, Fri.      warfarin (COUMADIN) 3 MG tablet Take 1.5 mg by mouth every Tuesday, Thursday, Saturday, Sunday.      amiodarone (PACERONE) 100 MG Tab Take by mouth once daily.      diltiaZEM (CARDIZEM CD) 120 MG Cp24 Take 1 capsule (120 mg total) by mouth once daily.      lidocaine 5 % Crea Apply 1 application topically every Mon, Wed, Fri.        Scheduled Meds:   atorvastatin  40 mg Oral Daily    calcium acetate(phosphat bind)  1,334 mg Oral TID WM    diltiaZEM  120 mg Oral Daily    diltiaZEM  180 mg Oral BID    docusate sodium  100  "mg Oral Once per day on Mon Wed Fri    dorzolamide-timolol 2-0.5%  1 drop Both Eyes BID    epoetin rosy-epbx  50 Units/kg Intravenous Every Mon, Wed, Fri    famotidine  20 mg Oral Daily    latanoprost  1 drop Both Eyes QHS    mupirocin   Nasal BID    [START ON 4/1/2023] warfarin  1.5 mg Oral Every Tues, Thurs, Sat, Sun    warfarin  3 mg Oral Every Mon, Wed, Fri     Continuous Infusions:  PRN Meds:.acetaminophen, acetaminophen, melatonin, ondansetron, sodium chloride 0.9%    Allergies:  Cyclobenzaprine, Fish containing products, Peanut, and Tramadol    Past Family History:  Reviewed; refer to Hospitalist Admission Note    Review of Systems:  Review of Systems - All 14 systems reviewed and negative, except as noted in HPI    Physical Exam:    BP (!) 125/44 (BP Location: Left arm, Patient Position: Lying)   Pulse (!) 47   Temp 98.2 °F (36.8 °C) (Oral)   Resp (!) 27   Ht 5' 5" (1.651 m)   Wt 53.1 kg (117 lb)   SpO2 98%   BMI 19.47 kg/m²     General Appearance:    Alert, cooperative, no distress, appears stated age   Head:    Normocephalic, without obvious abnormality, atraumatic   Eyes:    PER, conjunctiva/corneas clear, EOM's intact in both eyes        Throat:   Lips, mucosa, and tongue normal; teeth and gums normal   Back:     Symmetric, no curvature, ROM normal, no CVA tenderness   Lungs:     Clear to auscultation bilaterally, respirations unlabored   Chest wall:    No tenderness or deformity   Heart:    Regular rate and rhythm, S1 and S2 normal, no murmur, rub   or gallop   Abdomen:     Soft, non-tender, bowel sounds active all four quadrants,     no masses, no organomegaly   Extremities:   Extremities normal, atraumatic, no cyanosis or edema   Pulses:   2+ and symmetric all extremities   MSK:   No joint or muscle swelling, tenderness or deformity   Skin:   Skin color, texture, turgor normal, no rashes or lesions   Neurologic:   CNII-XII intact, normal strength and sensation       Throughout.  No flap "     Results:  Lab Results   Component Value Date     03/31/2023    K 4.5 03/31/2023     03/31/2023    CO2 29 03/31/2023    BUN 68 (H) 03/31/2023    CREATININE 12.5 (H) 03/31/2023    CALCIUM 8.9 03/31/2023    ANIONGAP 15 03/31/2023    ESTGFRAFRICA 4.7 (A) 02/08/2022    EGFRNONAA 4.0 (A) 02/08/2022       Lab Results   Component Value Date    CALCIUM 8.9 03/31/2023    PHOS 3.9 03/07/2023       Recent Labs   Lab 03/31/23  0921   WBC 6.52   RBC 3.22*   HGB 10.0*   HCT 32.2*      *   MCH 31.1*   MCHC 31.1*          I have personally reviewed pertinent radiological imaging and reports.    Patient care was time spent personally by me on the following activities:   Obtaining a history  Examination of patient.  Providing medical care at the patients bedside.  Developing a treatment plan with patient or surrogate and bedside caregivers  Ordering and reviewing laboratory studies, radiographic studies, pulse oximetry.  Ordering and performing treatments and interventions.  Evaluation of patient's response to treatment.  Discussions with consultants while on the unit and immediately available to the patient.  Re-evaluation of the patient's condition.  Documentation in the medical record.     Jimenez Valero MD  Nephrology  [facility]  [phone]

## 2023-03-31 NOTE — PROGRESS NOTES
03/31/23 1600   Required for all Hemodialysis Patients   Hepatitis Status negative  (Immune)   Treatment Type   Treatment Type Maintenance   Vital Signs   Temp 98.4 °F (36.9 °C)   Temp Source Oral   Pulse (!) 50   Heart Rate Source Monitor   Resp 16   SpO2 97 %   Pulse Oximetry Type Intermittent   Device (Oxygen Therapy) room air   BP (!) 116/46   BP Location Left arm   BP Method Automatic   Patient Position Lying   Assessments (Pre/Post)   Consent Obtained yes   Date Hepatitis Profile Obtained 03/06/23   Blood Liters Processed (BLP) 56.1   Transport Modality stretcher   Level of Consciousness (AVPU) alert   Pain   Preferred Pain Scale number (Numeric Rating Pain Scale)   Pain Rating (0-10): Rest 0        Hemodialysis AV Fistula Right upper arm   No Placement Date or Time found.   Present Prior to Hospital Arrival?: Yes  Location: Right upper arm   Site Assessment Clean;Dry;Intact;No redness;No swelling   Patency Present;Thrill;Bruit   Status Deaccessed   Flows Good   Dressing Intervention First dressing   Dressing Status Clean;Dry;Intact   Site Condition No complications   Dressing Gauze  (Paper tape)   During Hemodialysis Assessment   Blood Flow Rate (mL/min) 400 mL/min   Dialysate Flow Rate (mL/min) 800 ml/min   UF Removed (mL) 2107 mL   Post-Hemodialysis Assessment   Rinseback Volume (mL) 250 mL   Blood Volume Processed (Liters) 56.1 L   Dialyzer Clearance Lightly streaked   Duration of Treatment 180 minutes   Additional Fluid Intake (mL) 500 mL   Total UF (mL) 2107 mL   Net Fluid Removal 1607   Patient Response to Treatment Tolerated   Post-Treatment Weight   (Unable to obtain due to stretcher)   Post-Hemodialysis Comments HD complete/Blood reinfused per protocol.     Pt educated on Hemodialysis and AVF site care.   Pressure held for 7 minutes for Venous and Arterial sites.    Report given to Arley at 1610

## 2023-04-01 VITALS
SYSTOLIC BLOOD PRESSURE: 119 MMHG | TEMPERATURE: 97 F | HEART RATE: 51 BPM | BODY MASS INDEX: 18.83 KG/M2 | HEIGHT: 65 IN | DIASTOLIC BLOOD PRESSURE: 50 MMHG | OXYGEN SATURATION: 98 % | WEIGHT: 113 LBS | RESPIRATION RATE: 16 BRPM

## 2023-04-01 LAB
ALBUMIN SERPL BCP-MCNC: 3.5 G/DL (ref 3.5–5.2)
ALP SERPL-CCNC: 61 U/L (ref 55–135)
ALT SERPL W/O P-5'-P-CCNC: 22 U/L (ref 10–44)
ANION GAP SERPL CALC-SCNC: 10 MMOL/L (ref 8–16)
AST SERPL-CCNC: 17 U/L (ref 10–40)
BASOPHILS # BLD AUTO: 0.04 K/UL (ref 0–0.2)
BASOPHILS NFR BLD: 0.8 % (ref 0–1.9)
BILIRUB SERPL-MCNC: 0.9 MG/DL (ref 0.1–1)
BUN SERPL-MCNC: 39 MG/DL (ref 8–23)
CALCIUM SERPL-MCNC: 8.6 MG/DL (ref 8.7–10.5)
CHLORIDE SERPL-SCNC: 102 MMOL/L (ref 95–110)
CO2 SERPL-SCNC: 27 MMOL/L (ref 23–29)
CREAT SERPL-MCNC: 8.7 MG/DL (ref 0.5–1.4)
DIFFERENTIAL METHOD: ABNORMAL
EOSINOPHIL # BLD AUTO: 0.8 K/UL (ref 0–0.5)
EOSINOPHIL NFR BLD: 16.3 % (ref 0–8)
ERYTHROCYTE [DISTWIDTH] IN BLOOD BY AUTOMATED COUNT: 14.2 % (ref 11.5–14.5)
EST. GFR  (NO RACE VARIABLE): 4.2 ML/MIN/1.73 M^2
GLUCOSE SERPL-MCNC: 111 MG/DL (ref 70–110)
HCT VFR BLD AUTO: 32.9 % (ref 37–48.5)
HGB BLD-MCNC: 9.9 G/DL (ref 12–16)
IMM GRANULOCYTES # BLD AUTO: 0.01 K/UL (ref 0–0.04)
IMM GRANULOCYTES NFR BLD AUTO: 0.2 % (ref 0–0.5)
LYMPHOCYTES # BLD AUTO: 1.2 K/UL (ref 1–4.8)
LYMPHOCYTES NFR BLD: 25.8 % (ref 18–48)
MCH RBC QN AUTO: 30.2 PG (ref 27–31)
MCHC RBC AUTO-ENTMCNC: 30.1 G/DL (ref 32–36)
MCV RBC AUTO: 100 FL (ref 82–98)
MONOCYTES # BLD AUTO: 0.8 K/UL (ref 0.3–1)
MONOCYTES NFR BLD: 15.9 % (ref 4–15)
NEUTROPHILS # BLD AUTO: 1.9 K/UL (ref 1.8–7.7)
NEUTROPHILS NFR BLD: 41 % (ref 38–73)
NRBC BLD-RTO: 0 /100 WBC
PLATELET # BLD AUTO: 226 K/UL (ref 150–450)
PMV BLD AUTO: 9.8 FL (ref 9.2–12.9)
POTASSIUM SERPL-SCNC: 4.7 MMOL/L (ref 3.5–5.1)
PROT SERPL-MCNC: 6.4 G/DL (ref 6–8.4)
RBC # BLD AUTO: 3.28 M/UL (ref 4–5.4)
SODIUM SERPL-SCNC: 139 MMOL/L (ref 136–145)
WBC # BLD AUTO: 4.72 K/UL (ref 3.9–12.7)

## 2023-04-01 PROCEDURE — 25000003 PHARM REV CODE 250: Performed by: STUDENT IN AN ORGANIZED HEALTH CARE EDUCATION/TRAINING PROGRAM

## 2023-04-01 PROCEDURE — G0378 HOSPITAL OBSERVATION PER HR: HCPCS

## 2023-04-01 PROCEDURE — 36415 COLL VENOUS BLD VENIPUNCTURE: CPT | Performed by: STUDENT IN AN ORGANIZED HEALTH CARE EDUCATION/TRAINING PROGRAM

## 2023-04-01 PROCEDURE — 85025 COMPLETE CBC W/AUTO DIFF WBC: CPT | Performed by: STUDENT IN AN ORGANIZED HEALTH CARE EDUCATION/TRAINING PROGRAM

## 2023-04-01 PROCEDURE — 80053 COMPREHEN METABOLIC PANEL: CPT | Performed by: STUDENT IN AN ORGANIZED HEALTH CARE EDUCATION/TRAINING PROGRAM

## 2023-04-01 RX ADMIN — CALCIUM ACETATE 1334 MG: 667 CAPSULE ORAL at 12:04

## 2023-04-01 RX ADMIN — CALCIUM ACETATE 1334 MG: 667 CAPSULE ORAL at 08:04

## 2023-04-01 RX ADMIN — ATORVASTATIN CALCIUM 40 MG: 40 TABLET, FILM COATED ORAL at 08:04

## 2023-04-01 RX ADMIN — DILTIAZEM HYDROCHLORIDE 180 MG: 180 CAPSULE, COATED, EXTENDED RELEASE ORAL at 08:04

## 2023-04-01 RX ADMIN — DORZOLAMIDE HYDROCHLORIDE AND TIMOLOL MALEATE 1 DROP: 22.3; 6.8 SOLUTION/ DROPS OPHTHALMIC at 08:04

## 2023-04-01 RX ADMIN — MUPIROCIN 1 G: 20 OINTMENT TOPICAL at 08:04

## 2023-04-01 NOTE — NURSING
Patient discharging home today - hospital will be paying for her Uber ride home - no signs of distress - vital signs stable - patient ambulating with minimal assistance - SOILA Horowitz RN

## 2023-04-01 NOTE — PLAN OF CARE
04/01/23 1404   WARD Message   Medicare Outpatient and Observation Notification regarding financial responsibility Given to patient/caregiver;Explained to patient/caregiver;Signed/date by patient/caregiver   Date WARD was signed 04/01/23   Time WARD was signed 1117     WARD explained to patient- pt v/u and signed form. WARD uploaded into media manager.

## 2023-04-01 NOTE — H&P
Formerly [facility] Medicine History & Physical Examination   Patient Name: Tyra Isaac  MRN: [id number]  Patient Class: OP- Observation   Admission Date: 3/31/2023  8:25 AM  Length of Stay: 0  Attending Physician: Jony Hair MD  Primary Care Provider: Kalpesh Goel MD  Face-to-Face encounter date: 03/31/2023  Code Status: Full Code  MPOA:  Chief Complaint: Fatigue (C/o fatigue. Pt states missed dialysis wed & today due to no ride. )        HISTORY OF PRESENT ILLNESS:   Tyra Isaac is a 82 y.o.  female with known history of ESRD, afib, htn, hld who presented to ER with generalized fatigue after missing her dialysis session. Patient denies change in vision, hearing, headache, fever, cough, congestion, runny nose, chest pain, shortness of breath, palpitations, abdominal pain, diarrhea, constipation, vomiting, dysuria, hematuria, joint pain and back pain.     REVIEW OF SYSTEMS:   10 Point Review of System was performed and was found to be negative except for that mentioned already in the HPI above.     PAST MEDICAL HISTORY:     Past Medical History:   Diagnosis Date    A-fib     Anxiety     Depression     Disorder of kidney and ureter     Encephalopathy acute 1/1/2018    End stage kidney disease 6/17/2017    Gout     Hyperlipidemia     Hypertension     Moderate episode of recurrent major depressive disorder 1/17/2018    Nephropathy hypertensive, 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    Osteopenia of multiple sites 3/9/2018    Based upon bone density measurements. Patient also has chronic kidney disease.    Stroke 11/2016       PAST SURGICAL HISTORY:     Past Surgical History:   Procedure Laterality Date    ANGIOGRAM, CORONARY, WITH LEFT HEART CATHETERIZATION N/A 2/7/2022    Procedure: Angiogram, Coronary, with Left Heart Cath;  Surgeon: Gino Leal  MD;  Location: [facility] CATH/EP LAB;  Service: Cardiology;  Laterality: N/A;    CARDIAC SURGERY      stents    EYE SURGERY      WRIST SURGERY         ALLERGIES:   Cyclobenzaprine, Fish containing products, Peanut, and Tramadol    FAMILY HISTORY:     Family History   Problem Relation Age of Onset    Heart disease Mother     Cancer Father        SOCIAL HISTORY:     Social History     Tobacco Use    Smoking status: Never    Smokeless tobacco: Never   Substance Use Topics    Alcohol use: No        Social History     Substance and Sexual Activity   Sexual Activity Not Currently        HOME MEDICATIONS:     Prior to Admission medications    Medication Sig Start Date End Date Taking? Authorizing Provider   atorvastatin (LIPITOR) 40 MG tablet Take 40 mg by mouth once daily.   Yes Historical Provider   b complex vitamins tablet Take 1 tablet by mouth once daily.   Yes Historical Provider   calcium acetate,phosphat bind, (PHOSLO) 667 mg tablet Take 667 mg by mouth 2 (two) times daily with meals. 12/20/21  Yes Historical Provider   diltiaZEM (CARDIZEM CD) 120 MG Cp24 Take 180 mg by mouth 2 (two) times a day.   Yes Historical Provider   docusate sodium (COLACE) 100 MG capsule Take 100 mg by mouth 3 (three) times a week.   Yes Historical Provider   dorzolamide-timolol 2-0.5% (COSOPT) 22.3-6.8 mg/mL ophthalmic solution Place 1 drop into both eyes 2 (two) times daily.  2/6/18  Yes Historical Provider   famotidine (PEPCID) 20 MG tablet Take 1 tablet (20 mg total) by mouth nightly as needed for Heartburn. 2/14/22 3/6/24 Yes Kalpesh Goel MD   latanoprost 0.005 % ophthalmic solution Place 1 drop into both eyes every evening.  1/15/18  Yes Historical Provider   warfarin (COUMADIN) 3 MG tablet Take 3 mg by mouth every Mon, Wed, Fri.   Yes Historical Provider   warfarin (COUMADIN) 3 MG tablet Take 1.5 mg by mouth every Tuesday, Thursday, Saturday, Sunday.   Yes Historical Provider   amiodarone (PACERONE) 100 MG Tab Take by mouth once daily.  "   Historical Provider   diltiaZEM (CARDIZEM CD) 120 MG Cp24 Take 1 capsule (120 mg total) by mouth once daily. 3/7/23   Gladis De Leon MD   lidocaine 5 % Crea Apply 1 application topically every Mon, Wed, Fri.     Historical Provider         PHYSICAL EXAM:   BP (!) 106/50   Pulse (!) 53   Temp 98.6 °F (37 °C)   Resp 16   Ht 5' 5" (1.651 m)   Wt 51.3 kg (113 lb)   SpO2 96%   BMI 18.80 kg/m²   Vitals Reviewed  General appearance:  female in no apparent distress.  Skin: No Rash.   Neuro: Motor and sensory exams grossly intact. Good tone. Power in all 4 extremities 5/5.   HENT: Atraumatic head. Moist mucous membranes of oral cavity.  Eyes: Normal extraocular movements.   Neck: Supple. No evidence of lymphadenopathy. No thyroidomegaly.  Lungs: Clear to auscultation bilaterally. No wheezing present.   Heart: Regular rate and rhythm. S1 and S2 present with no murmurs/gallop/rub. No pedal edema. No JVD present.   Abdomen: Soft, non-distended, non-tender. No rebound tenderness/guarding. No masses or organomegaly. Bowel sounds are normal. Bladder is not palpable.   Extremities: No cyanosis, clubbing.  Psych/mental status: Alert and oriented. Cooperative. Responds appropriately to questions.   EMERGENCY DEPARTMENT LABS AND IMAGING:     Labs Reviewed   CBC W/ AUTO DIFFERENTIAL - Abnormal; Notable for the following components:       Result Value    RBC 3.22 (*)     Hemoglobin 10.0 (*)     Hematocrit 32.2 (*)      (*)     MCH 31.1 (*)     MCHC 31.1 (*)     Eos # 0.7 (*)     Lymph % 17.9 (*)     Mono % 15.5 (*)     Eosinophil % 10.9 (*)     All other components within normal limits   COMPREHENSIVE METABOLIC PANEL - Abnormal; Notable for the following components:    BUN 68 (*)     Creatinine 12.5 (*)     eGFR 2.7 (*)     All other components within normal limits   URINALYSIS, REFLEX TO URINE CULTURE - Abnormal; Notable for the following components:    Protein, UA 1+ (*)     All other components within normal " limits    Narrative:     Specimen Source->Urine   PROTIME-INR - Abnormal; Notable for the following components:    Prothrombin Time 21.0 (*)     INR 2.0 (*)     All other components within normal limits   MAGNESIUM   URINALYSIS MICROSCOPIC    Narrative:     Specimen Source->Urine       X-Ray Elbow Complete Left   Final Result          ASSESSMENT & PLAN:   Tyra Isaac is a 82 y.o. female admitted for    Active Hospital Problems    Diagnosis    *ESRD on HD via are right upper extremity AVF MWF    End stage renal disease on dialysis    Stage 5 chronic kidney disease on chronic dialysis    Chronic kidney disease, stage 4 (severe)     Patient has estimated GFR of approximately 27. Stage IV.01/21/2015. Patient is having adequate urine output. She feels mildly fatigued and tired. No shortness of breath. No asterixis. She will be following with Dr. Villarreal.        Benign hypertension with ESRD (end-stage renal disease)     BP slightly elevated1.21.15 BP elelvated. Took meds in [facility] will increase amlodipine to 10 mg per day. New prescription sent to the pharmacy. She will double the existing prescription. She will continue to monitor her blood pressures.                Plan    ESRD   K+ is wnl, not floridly overloaded  Consult nephro for HD  Continue home meds      Diet: renal    DVT Prophylaxis: heparin and Encourage ambulation. OOB as tolerated.     Discharge Planning and Disposition:  Home independent    Expected LOS: 1  ________________________________________________________________________________    INPATIENT LIST OF MEDICATIONS     Current Facility-Administered Medications:     0.9%  NaCl infusion, , Intravenous, PRN, Jimenez Valero MD    acetaminophen tablet 650 mg, 650 mg, Oral, Q8H PRN, Pako Hair MD    acetaminophen tablet 650 mg, 650 mg, Oral, Q8H PRN, Pako Hair MD    atorvastatin tablet 40 mg, 40 mg, Oral, Daily, Pako Hair MD    calcium acetate(phosphat bind)  capsule 1,334 mg, 1,334 mg, Oral, TID WM, Pako Hair MD, 1,334 mg at 03/31/23 1744    diltiaZEM 24 hr capsule 180 mg, 180 mg, Oral, BID, Pako Hair MD, 180 mg at 03/31/23 2109    docusate sodium capsule 100 mg, 100 mg, Oral, Once per day on Mon Wed Fri, Pako Hair MD    dorzolamide-timolol 2-0.5% ophthalmic solution 1 drop, 1 drop, Both Eyes, BID, Pako Hair MD, 1 drop at 03/31/23 2109    epoetin rosy-epbx injection 2,700 Units, 50 Units/kg, Intravenous, Every Mon, Wed, Fri, Pako Hair MD    famotidine tablet 20 mg, 20 mg, Oral, Q48H, Pako Hair MD    heparin (porcine) injection 5,000 Units, 5,000 Units, Intravenous, PRN, Jimenez Valero MD    latanoprost 0.005 % ophthalmic solution 1 drop, 1 drop, Both Eyes, QHS, Pako Hair MD, 1 drop at 03/31/23 2110    melatonin tablet 6 mg, 6 mg, Oral, Nightly PRN, Pako Hair MD    mupirocin 2 % ointment, , Nasal, BID, Pako Hair MD, 1 g at 03/31/23 2110    ondansetron injection 4 mg, 4 mg, Intravenous, Q8H PRN, Pako Hair MD, 4 mg at 03/31/23 1815    sodium chloride 0.9% bolus 250 mL 250 mL, 250 mL, Intravenous, PRN, Jimenez Valero MD    sodium chloride 0.9% flush 10 mL, 10 mL, Intravenous, PRN, Pako Hair MD    [START ON 4/1/2023] warfarin (COUMADIN) split tablet 1.5 mg, 1.5 mg, Oral, Every Tues, Thurs, Sat, Sun, Pako Hair MD    warfarin (COUMADIN) tablet 3 mg, 3 mg, Oral, Every Mon, Wed, Fri, Pako Hair MD, 3 mg at 03/31/23 1745      Scheduled Meds:   atorvastatin  40 mg Oral Daily    calcium acetate(phosphat bind)  1,334 mg Oral TID WM    diltiaZEM  180 mg Oral BID    docusate sodium  100 mg Oral Once per day on Mon Wed Fri    dorzolamide-timolol 2-0.5%  1 drop Both Eyes BID    epoetin rosy-epbx  50 Units/kg Intravenous Every Mon, Wed, Fri    famotidine  20 mg Oral Q48H    latanoprost  1 drop Both Eyes QHS     mupirocin   Nasal BID    [START ON 4/1/2023] warfarin  1.5 mg Oral Every Tues, Thurs, Sat, Sun    warfarin  3 mg Oral Every Mon, Wed, Fri     Continuous Infusions:  PRN Meds:.sodium chloride 0.9%, acetaminophen, acetaminophen, heparin (porcine), melatonin, ondansetron, sodium chloride 0.9%, sodium chloride 0.9%      Pako Hair  [facility] Hospitalist  03/31/2023

## 2023-04-01 NOTE — RESPIRATORY THERAPY
03/31/23 2022   Patient Assessment/Suction   Level of Consciousness (AVPU) alert   Respiratory Effort Normal;Unlabored   Expansion/Accessory Muscles/Retractions expansion symmetric;no use of accessory muscles   PRE-TX-O2   Device (Oxygen Therapy) room air   SpO2 96 %   Pulse Oximetry Type Intermittent   $ Pulse Oximetry - Multiple Charge Pulse Oximetry - Multiple   Pulse (!) 53   Resp 16

## 2023-04-01 NOTE — PROGRESS NOTES
INPATIENT NEPHROLOGY PROGRESS  [facility] NEPHROLOGY    Tyra Isaac  04/01/2023    Reason for consultation    esrd    Chief Complaint:   Chief Complaint   Patient presents with    Fatigue     C/o fatigue. Pt states missed dialysis wed & today due to no ride.           History of Present Illness:    Tyra Isaac is an 82 year old female with pmh atrial fibrillation, anxiety, depression, gout, hyperlipidemia, HTN, CKD on dialysis presenting to the ED with c/o generalized weakness and fatigue. She is scheduled for dialysis on MWF and was unable to attend two days ago and today due to not having a ride. She has had no chest pain, SOB, upper respiratory symptoms, fever, urinary symptoms, vomiting/diarrhea.     4/1 lab results reviewed.  VSS.  States she fell at home prior to admit, has R elbow pain.  Xray done yesterday, no fx.    Plan of Care:       Assessment:     esrd  --continue dialysis per routine  --fluid restrict  --renal dose medication per routine  --continue outpt medication  --continue binders with meals    Anemia  --erythropoiesis stimulating agent with renal replacement therapy    Hyperphosphatemia  --renal diet  --continue binders    Hypertension  --uf with hd  --fluid restrict  --low salt diet  --continue home medication         Thank you for allowing us to participate in this patient's care. We will continue to follow.    Vital Signs:  Temp Readings from Last 3 Encounters:   04/01/23 98.3 °F (36.8 °C)   03/21/23 98.5 °F (36.9 °C) (Oral)   03/07/23 98.5 °F (36.9 °C) (Oral)       Pulse Readings from Last 3 Encounters:   04/01/23 (!) 47   03/29/23 (!) 52   03/21/23 (!) 55       BP Readings from Last 3 Encounters:   04/01/23 (!) 100/47   03/29/23 (!) 143/62   03/21/23 (!) 111/52       Weight:  Wt Readings from Last 3 Encounters:   03/31/23 51.3 kg (113 lb)   03/29/23 53.1 kg (117 lb)   03/21/23 53.1 kg (117 lb)       Past Medical & Surgical History:  Past Medical History:   Diagnosis Date     A-fib     Anxiety     Depression     Disorder of kidney and ureter     Encephalopathy acute 1/1/2018    End stage kidney disease 6/17/2017    Gout     Hyperlipidemia     Hypertension     Moderate episode of recurrent major depressive disorder 1/17/2018    Nephropathy hypertensive, 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    Osteopenia of multiple sites 3/9/2018    Based upon bone density measurements. Patient also has chronic kidney disease.    Stroke 11/2016       Past Surgical History:   Procedure Laterality Date    ANGIOGRAM, CORONARY, WITH LEFT HEART CATHETERIZATION N/A 2/7/2022    Procedure: Angiogram, Coronary, with Left Heart Cath;  Surgeon: Gino Leal MD;  Location: [facility] CATH/EP LAB;  Service: Cardiology;  Laterality: N/A;    CARDIAC SURGERY      stents    EYE SURGERY      WRIST SURGERY         Past Social History:  Social History     Socioeconomic History    Marital status:      Spouse name: Aria Isaac    Number of children: 3   Occupational History    Occupation: Not working   Tobacco Use    Smoking status: Never    Smokeless tobacco: Never   Substance and Sexual Activity    Alcohol use: No    Drug use: No    Sexual activity: Not Currently     Social Determinants of Health     Financial Resource Strain: Medium Risk    Difficulty of Paying Living Expenses: Somewhat hard   Food Insecurity: No Food Insecurity    Worried About Running Out of Food in the Last Year: Never true    Ran Out of Food in the Last Year: Never true   Transportation Needs: Unmet Transportation Needs    Lack of Transportation (Medical): Yes    Lack of Transportation (Non-Medical): No   Physical Activity: Inactive    Days of Exercise per Week: 0 days    Minutes of Exercise per Session: 0 min   Stress: Stress Concern Present    Feeling of Stress : To some extent   Social Connections:  Moderately Isolated    Frequency of Communication with Friends and Family: Three times a week    Frequency of Social Gatherings with Friends and Family: Three times a week    Attends [employer] Services: 1 to 4 times per year    Active Member of Clubs or Organizations: No    Attends Club or Organization Meetings: Never    Marital Status:    Housing Stability: Low Risk     Unable to Pay for Housing in the Last Year: No    Number of Places Lived in the Last Year: 1    Unstable Housing in the Last Year: No       Medications:  No current facility-administered medications on file prior to encounter.     Current Outpatient Medications on File Prior to Encounter   Medication Sig Dispense Refill    atorvastatin (LIPITOR) 40 MG tablet Take 40 mg by mouth once daily.      b complex vitamins tablet Take 1 tablet by mouth once daily.      calcium acetate,phosphat bind, (PHOSLO) 667 mg tablet Take 667 mg by mouth 2 (two) times daily with meals.      diltiaZEM (CARDIZEM CD) 120 MG Cp24 Take 180 mg by mouth 2 (two) times a day.      docusate sodium (COLACE) 100 MG capsule Take 100 mg by mouth 3 (three) times a week.      dorzolamide-timolol 2-0.5% (COSOPT) 22.3-6.8 mg/mL ophthalmic solution Place 1 drop into both eyes 2 (two) times daily.       famotidine (PEPCID) 20 MG tablet Take 1 tablet (20 mg total) by mouth nightly as needed for Heartburn. 30 tablet 5    latanoprost 0.005 % ophthalmic solution Place 1 drop into both eyes every evening.       warfarin (COUMADIN) 3 MG tablet Take 3 mg by mouth every Mon, Wed, Fri.      warfarin (COUMADIN) 3 MG tablet Take 1.5 mg by mouth every Tuesday, Thursday, Saturday, Sunday.      amiodarone (PACERONE) 100 MG Tab Take by mouth once daily.      diltiaZEM (CARDIZEM CD) 120 MG Cp24 Take 1 capsule (120 mg total) by mouth once daily.      lidocaine 5 % Crea Apply 1 application topically every Mon, Wed, Fri.        Scheduled Meds:   atorvastatin  40 mg Oral Daily    calcium  "acetate(phosphat bind)  1,334 mg Oral TID WM    diltiaZEM  180 mg Oral BID    docusate sodium  100 mg Oral Once per day on Mon Wed Fri    dorzolamide-timolol 2-0.5%  1 drop Both Eyes BID    epoetin rosy-epbx  50 Units/kg Intravenous Every Mon, Wed, Fri    famotidine  20 mg Oral Q48H    latanoprost  1 drop Both Eyes QHS    mupirocin   Nasal BID    warfarin  1.5 mg Oral Every Tues, Thurs, Sat, Sun    warfarin  3 mg Oral Every Mon, Wed, Fri     Continuous Infusions:  PRN Meds:.sodium chloride 0.9%, acetaminophen, acetaminophen, heparin (porcine), melatonin, ondansetron, sodium chloride 0.9%, sodium chloride 0.9%    Allergies:  Cyclobenzaprine, Fish containing products, Peanut, and Tramadol    Past Family History:  Reviewed; refer to Hospitalist Admission Note    Review of Systems:  Review of Systems - All 14 systems reviewed and negative, except as noted in HPI    Physical Exam:    BP (!) 100/47   Pulse (!) 47   Temp 98.3 °F (36.8 °C)   Resp 16   Ht 5' 5" (1.651 m)   Wt 51.3 kg (113 lb)   SpO2 97%   BMI 18.80 kg/m²     General Appearance:    Alert, cooperative, no distress, appears stated age   Head:    Normocephalic, without obvious abnormality, atraumatic   Eyes:    PER, conjunctiva/corneas clear, EOM's intact in both eyes        Throat:   Lips, mucosa, and tongue normal; teeth and gums normal   Back:     Symmetric, no curvature, ROM normal, no CVA tenderness   Lungs:     Clear to auscultation bilaterally, respirations unlabored   Chest wall:    No tenderness or deformity   Heart:    Regular rate and rhythm, S1 and S2 normal, no murmur, rub   or gallop   Abdomen:     Soft, non-tender, bowel sounds active all four quadrants,     no masses, no organomegaly   Extremities:   Extremities normal, atraumatic, no cyanosis or edema   Pulses:   2+ and symmetric all extremities   MSK:   No joint or muscle swelling, tenderness or deformity   Skin:   Skin color, texture, turgor normal, no rashes or lesions   Neurologic:   " CNII-XII intact, normal strength and sensation       Throughout.  No flap     Results:  Lab Results   Component Value Date     04/01/2023    K 4.7 04/01/2023     04/01/2023    CO2 27 04/01/2023    BUN 39 (H) 04/01/2023    CREATININE 8.7 (H) 04/01/2023    CALCIUM 8.6 (L) 04/01/2023    ANIONGAP 10 04/01/2023    ESTGFRAFRICA 4.7 (A) 02/08/2022    EGFRNONAA 4.0 (A) 02/08/2022       Lab Results   Component Value Date    CALCIUM 8.6 (L) 04/01/2023    PHOS 3.9 03/07/2023       Recent Labs   Lab 04/01/23  0503   WBC 4.72   RBC 3.28*   HGB 9.9*   HCT 32.9*      *   MCH 30.2   MCHC 30.1*            I have personally reviewed pertinent radiological imaging and reports.    Patient care was time spent personally by me on the following activities:   Obtaining a history  Examination of patient.  Providing medical care at the patients bedside.  Developing a treatment plan with patient or surrogate and bedside caregivers  Ordering and reviewing laboratory studies, radiographic studies, pulse oximetry.  Ordering and performing treatments and interventions.  Evaluation of patient's response to treatment.  Discussions with consultants while on the unit and immediately available to the patient.  Re-evaluation of the patient's condition.  Documentation in the medical record.     Jimenez Valero MD  Nephrology  [facility]  [phone]

## 2023-04-01 NOTE — NURSING
Assessed patient - alert and oriented - can ambulate to bathroom - dialysis patient - MWF - took of 1.6 liters yesterday - patient had episode of nausea and vomiting yesterday but at present time does not have any symptoms - vital signs within normal parameters - no complaints of pain - no visible signs or symptoms of distress - will continue to monitor throughout shift for any changes in condition - SOILA Horowitz RN

## 2023-04-01 NOTE — PLAN OF CARE
04/01/23 1406   Final Note   Assessment Type Final Discharge Note   Anticipated Discharge Disposition Home   What phone number can be called within the next 1-3 days to see how you are doing after discharge? [phone]   Hospital Resources/Appts/Education Provided Provided patient/caregiver with written discharge plan information   Post-Acute Status   Discharge Delays None known at this time     Discharge orders and chart reviewed. No other discharge needs noted at this time. Pt is clear for discharge from case management. Pt is discharging to home.    Patient to resume OP HD with scheduled chair time as before hospitalization.

## 2023-04-19 NOTE — DISCHARGE SUMMARY
[facility]  Discharge Summary  Patient Name: Tyra Isaac MRN: [id number]   Patient Class: OP- Observation  Length of Stay: 0   Admission Date: 3/31/2023  8:25 AM Attending Physician: Pako Hair MD   Primary Care Provider: Kalpesh Goel MD Face-to-Face encounter date: 4/1/23   Chief Complaint: Fatigue (C/o fatigue. Pt states missed dialysis wed & today due to no ride. )    Date of Discharge: 4/1/23  Discharge Disposition:Home or Self Care   Condition: Stable       Reason for Hospitalization     Active Hospital Problems    Diagnosis    *ESRD on HD via are right upper extremity AVF MWF    End stage renal disease on dialysis    Stage 5 chronic kidney disease on chronic dialysis    Chronic kidney disease, stage 4 (severe)     Patient has estimated GFR of approximately 27. Stage IV.01/21/2015. Patient is having adequate urine output. She feels mildly fatigued and tired. No shortness of breath. No asterixis. She will be following with Dr. Villarreal.        Benign hypertension with ESRD (end-stage renal disease)     BP slightly elevated1.21.15 BP elelvated. Took meds in [facility] will increase amlodipine to 10 mg per day. New prescription sent to the pharmacy. She will double the existing prescription. She will continue to monitor her blood pressures.             Brief History of Present Illness   Tyra Isaac is a 82 y.o.  female with known history of ESRD, afib, htn, hld who presented to ER with generalized fatigue after missing her dialysis session. Patient denies change in vision, hearing, headache, fever, cough, congestion, runny nose, chest pain, shortness of breath, palpitations, abdominal pain, diarrhea, constipation, vomiting, dysuria, hematuria, joint pain and back pain.     Hospital Course By Problem with Pertinent Findings       ESRD   K+ is wnl, not floridly overloaded  Consult nephro for HD  Continue home meds  Completed HD  Discharge       Patient was seen and examined on the date  "of discharge and determined to be suitable for discharge.    Physical Exam  BP (!) 119/50   Pulse (!) 51   Temp 97.3 °F (36.3 °C) (Oral)   Resp 16   Ht 5' 5" (1.651 m)   Wt 51.3 kg (113 lb)   SpO2 98%   BMI 18.80 kg/m²   Vitals reviewed.    Constitutional: No distress.   HENT: Atraumatic.   Cardiovascular: Normal rate, regular rhythm.  S1 S2.    Pulmonary/Chest: Effort normal. Clear to auscultation bilaterally. No wheezes.   Abdominal: Soft. Bowel sounds are normal. Exhibits no distension and no mass. No tenderness  Neurological: Alert.   Skin: Skin is warm and dry.     Following labs were Reviewed   No results for input(s): WBC, HGB, HCT, PLT, GLUCOSE, CALCIUM, ALBUMIN, PROT, NA, K, CO2, CL, BUN, CREATININE, ALKPHOS, ALT, AST, BILITOT in the last 24 hours.  No results found for: POCTGLUCOSE     All labs within the past 24 hours have been reviewed    Microbiology Results (last 7 days)       ** No results found for the last 168 hours. **          X-Ray Elbow Complete Left   Final Result          No results found for this or any previous visit.      Consultants and Procedures   Consultants:  Consults (From admission, onward)          Status Ordering Provider     Inpatient consult to Nephrology  Once        Provider:  Jimenez Valero MD    Completed CHANI ELISE            Procedures:   * No surgery found *     Discharge Information:   Diet:  Resume Cardiac diet/Diabetic Diet    Physical Activity:  Activity as tolerated    Instructions:  1. Take all medications as prescribed  2. Keep all follow-up appointments  3. Return to the hospital or call your primary care physicians if any worsening symptoms such as chest pain, shortness of breath, bleeding,  intractable pain, fever >101 occur.      Follow-Up Appointments:  Please call your primary care physician to schedule an appointment in 1 week time.     Follow-up Information       [facility] OP HD Follow up.    Why: Resume HD at [facility] with your normal scheduled " chair time                             Pending laboratory work/Tests to be performed/followed by the Primary care Physician:    The patient was discharged with discharge instructions reviewed in written and verbal form. All questions were answered and prescriptions were provided. The importance of making follow up appointments and compliance of medications has been emphasized. The patient will follow up in 1 week or sooner as needed with the PCP. Tthe patient understands and agrees with the plan. Upon discharge, patient needs to be on following medications.    Discharge Medications:     Medication List        CONTINUE taking these medications      amiodarone 100 MG Tab  Commonly known as: PACERONE     atorvastatin 40 MG tablet  Commonly known as: LIPITOR     b complex vitamins tablet     calcium acetate(phosphat bind) 667 mg tablet  Commonly known as: PHOSLO     * diltiaZEM 120 MG Cp24  Commonly known as: CARDIZEM CD  Take 1 capsule (120 mg total) by mouth once daily.     * diltiaZEM 120 MG Cp24  Commonly known as: CARDIZEM CD     docusate sodium 100 MG capsule  Commonly known as: COLACE     dorzolamide-timolol 2-0.5% 22.3-6.8 mg/mL ophthalmic solution  Commonly known as: COSOPT     famotidine 20 MG tablet  Commonly known as: PEPCID  Take 1 tablet (20 mg total) by mouth nightly as needed for Heartburn.     latanoprost 0.005 % ophthalmic solution     LIDOcaine 5 % Crea     * warfarin 3 MG tablet  Commonly known as: COUMADIN     * warfarin 3 MG tablet  Commonly known as: COUMADIN           * This list has 4 medication(s) that are the same as other medications prescribed for you. Read the directions carefully, and ask your doctor or other care provider to review them with you.                    I spent 22 minutes preparing the discharge for this patient including reviewing records from previous encounters, preparation of discharge summary, assessing and final examination of the patient, discharge medicine  reconciliation, discussing plan of care, follow up and education and prescriptions.       Pako Hair  [facility] Hospitalist

## 2023-05-25 ENCOUNTER — PATIENT MESSAGE (OUTPATIENT)
Dept: FAMILY MEDICINE | Facility: CLINIC | Age: 83
End: 2023-05-25
Payer: MEDICARE

## 2023-05-25 ENCOUNTER — TELEPHONE (OUTPATIENT)
Dept: FAMILY MEDICINE | Facility: CLINIC | Age: 83
End: 2023-05-25
Payer: MEDICARE

## 2023-05-25 NOTE — TELEPHONE ENCOUNTER
Tried calling pt about setting up a Cardiology appt p/Referral  No answer, No voicemail, Info sent to pt portal

## 2023-06-26 NOTE — PATIENT INSTRUCTIONS
Assessment & Plan     Hospital discharge follow-up  Closed fracture of right hip with routine healing, subsequent encounter  Anemia due to blood loss, acute  Admitted for right hip fracture s/p open reduction internal fixation with nailing 5/17/2023.  Has 1 additional visit scheduled through [facility] and continues to use a walker to ambulate.  Had acute blood loss anemia and required 1 unit PRBCs.  Discharge hemoglobin 8.6, reassess today.  - Hemoglobin    Age-related osteoporosis without current pathological fracture  Vitamin D deficiency  Known osteoporosis on alendronate and vitamin D.  Reassess vitamin D level today and adjust supplement dose if needed.  Alendronate refills provided and recommend updating DEXA.  - alendronate (FOSAMAX) 70 MG tablet  Dispense: 12 tablet; Refill: 3  - Vitamin D Deficiency    Acute cystitis without hematuria  Resolved with IV ceftriaxone and p.o. cefdinir.    Benign essential hypertension  Hyponatremia  Resolved prior to discharge.  Blood pressure stable today, will start-hydrochlorothiazide refills provided.  - losartan-hydrochlorothiazide (HYZAAR) 100-25 MG tablet  Dispense: 90 tablet; Refill: 3    Pulmonary emphysema, unspecified emphysema type (H)  Smoker  Symptoms controlled with daily Spiriva and as needed albuterol.  Nicotine/Tobacco Cessation:  She reports that she has been smoking cigarettes. She started smoking about 61 years ago. She has a 60.00 pack-year smoking history. She has never used smokeless tobacco.  Nicotine/Tobacco Cessation Plan:   Information offered: Patient not interested at this time    Gastroesophageal reflux disease without esophagitis   stable, refills provided.  - omeprazole (PRILOSEC) 20 MG DR capsule  Dispense: 90 capsule; Refill: 3    Visit for screening mammogram  - MA SCREENING DIGITAL BILAT - Future  (s+30)    Persistent insomnia  Refills provided.  - melatonin 1 MG TABS tablet  Dispense: 90 tablet; Refill: 3       MED REC  "Patient Education       Atrial Fibrillation   The Basics   Written by the doctors and editors at [facility]   What is atrial fibrillation? -- Atrial fibrillation is the most common heart rhythm problem (figure 1). The condition puts you at risk of stroke and other problems as well as death. Another term for atrial fibrillation is "A-fib."  In people with A-fib, the electrical signals that control the heartbeat are abnormal. The top 2 chambers (there are 4 chambers) of the heart stop pumping blood as strongly as normal. When this happens, the blood can start to form clots. These clots can travel to the brain through the blood vessels and cause strokes.  In some people, A-fib never goes away. In others, A-fib can come and go, even with treatment. If you had A-fib in the past, but have a normal heart rhythm now, ask your doctor what you can do to keep A-fib from coming back. Some people can reduce their chances of having A-fib again by:  · Controlling their blood pressure  · Avoiding or limiting alcohol  · Cutting down on caffeine  · Getting treatment for an overactive thyroid gland  · Getting regular exercise  · Losing weight (if they are overweight)  · Reducing stress  What are the symptoms of atrial fibrillation? -- Some people with A-fib have no symptoms. When symptoms do occur, they can include:  · Feeling as though your heart is racing, skipping beats, or beating out of sync  · Mild chest "tightness" or pain  · Feeling lightheaded, dizzy, or like you might pass out  · Having trouble breathing, especially with exercise  Is there a test for atrial fibrillation? -- Yes. If your doctor or nurse suspects you have A-fib, he or she will probably do a test called an electrocardiogram. This test, also known as an "ECG," measures the electrical activity in your heart.  How is atrial fibrillation treated? -- In some cases, A-fib goes away on its own, even without treatment. But many people do need treatment.  Treatment can " "REQUIRED  Post Medication Reconciliation Status:  Discharge medications reconciled and changed, see notes/orders        Anahi Andersen DO  [facility] Center[facility]RA[facility]N Breaux is a 75 year old, presenting for the following health issues:  Hospital F/U (5/16-5/19, [facility], Right Intertroch Hip Fract) and [facility] Care (Pcp was serafin)        6/26/2023  Suzy GREEN   Additional Questions   Roomed by EDGAR Lea CMA([facility])     [facility]       Hospital Follow-up Visit:    Hospital/Nursing Home/ Rehab Facility: [facility]  Date of Admission: 5/16  Date of Discharge: 5/19  Reason(s) for Admission: Right Intertroch Hip Fract    Was your hospitalization related to COVID-19? No   Problems taking medications regularly:  None  Medication changes since discharge: None  Problems adhering to non-medication therapy:  None    Summary of hospitalization:  [facility] discharge summary reviewed  Diagnostic Tests/Treatments reviewed.  Follow up needed: none  Other Healthcare Providers Involved in Patient s Care:         None  Update since discharge: improved. Plan of care communicated with patient and family       Admitted with right hip pain found to have right femur fracture.  S/p open reduction internal fixation along with nail 5/17 through [facility].  Hemoglobin dropped to 6.8 s/p 1 unit PRBCs.  Resumed alendronate postdischarge.  Was on Lovenox x28 days, now discontinued.  No longer on oxycodone for pain control.    History of COPD, ongoing tobacco use.  Uses Spiriva daily and albuterol nebs as needed. 1/2 ppd.     History of alcohol use disorder and hyponatremia.  Is on hydrochlorothiazide.    Found to have a UTI and toxic metabolic encephalopathy, culture grew E. coli subsequently treated with cefdinir.        Objective    /71   Pulse 102   Ht 1.549 m (5' 1\")   Wt 43.2 kg (95 lb 3.2 oz)   SpO2 96%   BMI 17.99 kg/m    Body mass index is 17.99 " "include 1 or more of these:  · Medicines to control the speed or rhythm of the heartbeat  · Medicines to keep clots from forming  · A treatment called "cardioversion," which involves applying a mild electrical current to the heart to make the rhythm regular again  · Treatments called "ablation," which use heat ("radiofrequency ablation") or cold ("cryoablation") to destroy the small part of the heart that is sending abnormal electrical signals  · A device called a pacemaker that is implanted in your body and sends electrical signals to the heart to control the heartbeat  What will my life be like? -- Most people with A-fib are able to live normal lives. Still, it is important that you take the medicines your doctor prescribes every day. Taking your medicines as directed can help reduce the chances that your A-fib will cause a stroke. It's also a good idea to learn what the signs and symptoms of a stroke are (figure 2).  All topics are updated as new evidence becomes available and our peer review process is complete.  This topic retrieved from [employer] on: Sep 21, 2021.  Topic 40482 Version 18.0  Release: 29.4.2 - C29.263  © 2021 UpToDate, Inc. and/or its affiliates. All rights reserved.  figure 1: Atrial fibrillation     This drawing shows where the heart is located in the chest. In atrial fibrillation, the electrical signals that control the heartbeat are abnormal. As a result, the top two chambers of the heart (see arrows) stop pumping effectively, and blood that should move out of these chambers gets left behind.  Graphic 93099 Version 3.0    figure 2: Signs of stroke     The letters in the word "FAST" help you remember the signs of stroke. Some experts use "BE-FAST." This adds Balance (trouble standing or walking) and Eyes (problems with vision) to the list above.  If a person shows any of these signs, call for an ambulance right away. In the [place] and [place], dial 9-1-1.    Graphic 09911 Version 5.0    Consumer " Information Use and Disclaimer   This information is not specific medical advice and does not replace information you receive from your health care provider. This is only a brief summary of general information. It does NOT include all information about conditions, illnesses, injuries, tests, procedures, treatments, therapies, discharge instructions or life-style choices that may apply to you. You must talk with your health care provider for complete information about your health and treatment options. This information should not be used to decide whether or not to accept your health care provider's advice, instructions or recommendations. Only your health care provider has the knowledge and training to provide advice that is right for you. The use of this information is governed by the [employer] End User License Agreement, available at https://[email or web address].CRE Secure.Eight19/en/solutions/Gimado/about/lazaro.The use of [employer] content is governed by the [employer] Terms of Use. ©2021 UpToDate, Inc. All rights reserved.  Copyright   © 2021 UpToDate, Inc. and/or its affiliates. All rights reserved.     kg/m .  Physical Exam   GENERAL: alert and no distress  RESP: lungs clear to auscultation - no rales, rhonchi or wheezes  CV: regular rates and rhythm, normal S1 S2, no S3 or S4, no murmur, click or rub and no peripheral edema  MS: Trace edema right lateral hip, uses walker to ambulate  PSYCH: mentation appears normal, affect normal/bright

## 2023-06-28 ENCOUNTER — HOSPITAL ENCOUNTER (OUTPATIENT)
Facility: HOSPITAL | Age: 83
Discharge: HOME HEALTH | End: 2023-06-29
Attending: EMERGENCY MEDICINE | Admitting: HOSPITALIST
Payer: MEDICARE

## 2023-06-28 DIAGNOSIS — R07.9 CHEST PAIN: ICD-10-CM

## 2023-06-28 DIAGNOSIS — R00.0 TACHYCARDIA: ICD-10-CM

## 2023-06-28 DIAGNOSIS — Z79.01 ANTICOAGULATED ON COUMADIN: Chronic | ICD-10-CM

## 2023-06-28 DIAGNOSIS — N18.6 STAGE 5 CHRONIC KIDNEY DISEASE ON CHRONIC DIALYSIS: ICD-10-CM

## 2023-06-28 DIAGNOSIS — R07.9 CHEST PAIN, UNSPECIFIED TYPE: Primary | ICD-10-CM

## 2023-06-28 DIAGNOSIS — I48.91 ATRIAL FIBRILLATION, UNSPECIFIED TYPE: ICD-10-CM

## 2023-06-28 DIAGNOSIS — R06.00 DYSPNEA, UNSPECIFIED TYPE: ICD-10-CM

## 2023-06-28 DIAGNOSIS — Z99.2 STAGE 5 CHRONIC KIDNEY DISEASE ON CHRONIC DIALYSIS: ICD-10-CM

## 2023-06-28 LAB
ALBUMIN SERPL BCP-MCNC: 3.5 G/DL (ref 3.5–5.2)
ALP SERPL-CCNC: 49 U/L (ref 55–135)
ALT SERPL W/O P-5'-P-CCNC: 16 U/L (ref 10–44)
ANION GAP SERPL CALC-SCNC: 16 MMOL/L (ref 8–16)
AST SERPL-CCNC: 20 U/L (ref 10–40)
BASOPHILS # BLD AUTO: 0.02 K/UL (ref 0–0.2)
BASOPHILS NFR BLD: 0.3 % (ref 0–1.9)
BILIRUB SERPL-MCNC: 1 MG/DL (ref 0.1–1)
BNP SERPL-MCNC: 762 PG/ML (ref 0–99)
BUN SERPL-MCNC: 36 MG/DL (ref 8–23)
CALCIUM SERPL-MCNC: 8.5 MG/DL (ref 8.7–10.5)
CHLORIDE SERPL-SCNC: 101 MMOL/L (ref 95–110)
CO2 SERPL-SCNC: 25 MMOL/L (ref 23–29)
CREAT SERPL-MCNC: 8.1 MG/DL (ref 0.5–1.4)
DIFFERENTIAL METHOD: ABNORMAL
EOSINOPHIL # BLD AUTO: 0.8 K/UL (ref 0–0.5)
EOSINOPHIL NFR BLD: 11.7 % (ref 0–8)
ERYTHROCYTE [DISTWIDTH] IN BLOOD BY AUTOMATED COUNT: 14.5 % (ref 11.5–14.5)
EST. GFR  (NO RACE VARIABLE): 4.5 ML/MIN/1.73 M^2
GLUCOSE SERPL-MCNC: 82 MG/DL (ref 70–110)
HCT VFR BLD AUTO: 33 % (ref 37–48.5)
HGB BLD-MCNC: 10.5 G/DL (ref 12–16)
IMM GRANULOCYTES # BLD AUTO: 0.03 K/UL (ref 0–0.04)
IMM GRANULOCYTES NFR BLD AUTO: 0.4 % (ref 0–0.5)
INR PPP: 3.9 (ref 0.8–1.2)
LYMPHOCYTES # BLD AUTO: 1.1 K/UL (ref 1–4.8)
LYMPHOCYTES NFR BLD: 15.5 % (ref 18–48)
MAGNESIUM SERPL-MCNC: 1.8 MG/DL (ref 1.6–2.6)
MCH RBC QN AUTO: 31.7 PG (ref 27–31)
MCHC RBC AUTO-ENTMCNC: 31.8 G/DL (ref 32–36)
MCV RBC AUTO: 100 FL (ref 82–98)
MONOCYTES # BLD AUTO: 1 K/UL (ref 0.3–1)
MONOCYTES NFR BLD: 14.2 % (ref 4–15)
NEUTROPHILS # BLD AUTO: 3.9 K/UL (ref 1.8–7.7)
NEUTROPHILS NFR BLD: 57.9 % (ref 38–73)
NRBC BLD-RTO: 0 /100 WBC
PLATELET # BLD AUTO: 264 K/UL (ref 150–450)
PMV BLD AUTO: 10.1 FL (ref 9.2–12.9)
POTASSIUM SERPL-SCNC: 3.4 MMOL/L (ref 3.5–5.1)
PROT SERPL-MCNC: 6.6 G/DL (ref 6–8.4)
PROTHROMBIN TIME: 39.5 SEC (ref 9–12.5)
RBC # BLD AUTO: 3.31 M/UL (ref 4–5.4)
SARS-COV-2 RDRP RESP QL NAA+PROBE: NEGATIVE
SODIUM SERPL-SCNC: 142 MMOL/L (ref 136–145)
TROPONIN I SERPL HS-MCNC: 36.4 PG/ML (ref 0–14.9)
TROPONIN I SERPL HS-MCNC: 49.3 PG/ML (ref 0–14.9)
TROPONIN I SERPL HS-MCNC: 58.9 PG/ML (ref 0–14.9)
WBC # BLD AUTO: 6.78 K/UL (ref 3.9–12.7)

## 2023-06-28 PROCEDURE — 83735 ASSAY OF MAGNESIUM: CPT | Performed by: EMERGENCY MEDICINE

## 2023-06-28 PROCEDURE — 99285 EMERGENCY DEPT VISIT HI MDM: CPT | Mod: 25

## 2023-06-28 PROCEDURE — 94761 N-INVAS EAR/PLS OXIMETRY MLT: CPT

## 2023-06-28 PROCEDURE — 36415 COLL VENOUS BLD VENIPUNCTURE: CPT | Performed by: NURSE PRACTITIONER

## 2023-06-28 PROCEDURE — 25000003 PHARM REV CODE 250: Performed by: EMERGENCY MEDICINE

## 2023-06-28 PROCEDURE — U0002 COVID-19 LAB TEST NON-CDC: HCPCS | Performed by: EMERGENCY MEDICINE

## 2023-06-28 PROCEDURE — 25000003 PHARM REV CODE 250: Performed by: NURSE PRACTITIONER

## 2023-06-28 PROCEDURE — G0378 HOSPITAL OBSERVATION PER HR: HCPCS

## 2023-06-28 PROCEDURE — 90935 HEMODIALYSIS ONE EVALUATION: CPT

## 2023-06-28 PROCEDURE — 63600175 PHARM REV CODE 636 W HCPCS: Performed by: INTERNAL MEDICINE

## 2023-06-28 PROCEDURE — 63600175 PHARM REV CODE 636 W HCPCS: Performed by: EMERGENCY MEDICINE

## 2023-06-28 PROCEDURE — 85025 COMPLETE CBC W/AUTO DIFF WBC: CPT | Performed by: EMERGENCY MEDICINE

## 2023-06-28 PROCEDURE — 84484 ASSAY OF TROPONIN QUANT: CPT | Performed by: EMERGENCY MEDICINE

## 2023-06-28 PROCEDURE — 96361 HYDRATE IV INFUSION ADD-ON: CPT

## 2023-06-28 PROCEDURE — 96366 THER/PROPH/DIAG IV INF ADDON: CPT | Mod: 59

## 2023-06-28 PROCEDURE — 96365 THER/PROPH/DIAG IV INF INIT: CPT

## 2023-06-28 PROCEDURE — 83880 ASSAY OF NATRIURETIC PEPTIDE: CPT | Performed by: EMERGENCY MEDICINE

## 2023-06-28 PROCEDURE — 85610 PROTHROMBIN TIME: CPT | Performed by: EMERGENCY MEDICINE

## 2023-06-28 PROCEDURE — 80053 COMPREHEN METABOLIC PANEL: CPT | Performed by: EMERGENCY MEDICINE

## 2023-06-28 PROCEDURE — 93010 EKG 12-LEAD: ICD-10-PCS | Mod: 76,,, | Performed by: INTERNAL MEDICINE

## 2023-06-28 PROCEDURE — 84484 ASSAY OF TROPONIN QUANT: CPT | Mod: 91 | Performed by: NURSE PRACTITIONER

## 2023-06-28 PROCEDURE — 93005 ELECTROCARDIOGRAM TRACING: CPT | Mod: 59 | Performed by: INTERNAL MEDICINE

## 2023-06-28 PROCEDURE — 96374 THER/PROPH/DIAG INJ IV PUSH: CPT

## 2023-06-28 PROCEDURE — 93010 ELECTROCARDIOGRAM REPORT: CPT | Mod: 76,,, | Performed by: INTERNAL MEDICINE

## 2023-06-28 PROCEDURE — 96375 TX/PRO/DX INJ NEW DRUG ADDON: CPT | Mod: 59

## 2023-06-28 RX ORDER — DIGOXIN 0.25 MG/ML
250 INJECTION INTRAMUSCULAR; INTRAVENOUS ONCE
Status: COMPLETED | OUTPATIENT
Start: 2023-06-28 | End: 2023-06-28

## 2023-06-28 RX ORDER — MAGNESIUM SULFATE HEPTAHYDRATE 40 MG/ML
2 INJECTION, SOLUTION INTRAVENOUS ONCE
Status: COMPLETED | OUTPATIENT
Start: 2023-06-28 | End: 2023-06-28

## 2023-06-28 RX ORDER — TALC
6 POWDER (GRAM) TOPICAL NIGHTLY PRN
Status: DISCONTINUED | OUTPATIENT
Start: 2023-06-28 | End: 2023-06-29 | Stop reason: HOSPADM

## 2023-06-28 RX ORDER — ACETAMINOPHEN 325 MG/1
650 TABLET ORAL EVERY 8 HOURS PRN
Status: DISCONTINUED | OUTPATIENT
Start: 2023-06-28 | End: 2023-06-29 | Stop reason: HOSPADM

## 2023-06-28 RX ORDER — DILTIAZEM HYDROCHLORIDE 5 MG/ML
5 INJECTION INTRAVENOUS
Status: COMPLETED | OUTPATIENT
Start: 2023-06-28 | End: 2023-06-28

## 2023-06-28 RX ORDER — POTASSIUM CHLORIDE 20 MEQ/1
40 TABLET, EXTENDED RELEASE ORAL ONCE
Status: DISCONTINUED | OUTPATIENT
Start: 2023-06-28 | End: 2023-06-28

## 2023-06-28 RX ORDER — SODIUM CHLORIDE 0.9 % (FLUSH) 0.9 %
10 SYRINGE (ML) INJECTION EVERY 12 HOURS PRN
Status: DISCONTINUED | OUTPATIENT
Start: 2023-06-28 | End: 2023-06-29 | Stop reason: HOSPADM

## 2023-06-28 RX ORDER — HEPARIN SODIUM 5000 [USP'U]/ML
5000 INJECTION, SOLUTION INTRAVENOUS; SUBCUTANEOUS EVERY 8 HOURS
Status: DISCONTINUED | OUTPATIENT
Start: 2023-06-28 | End: 2023-06-28

## 2023-06-28 RX ORDER — IBUPROFEN 200 MG
16 TABLET ORAL
Status: DISCONTINUED | OUTPATIENT
Start: 2023-06-28 | End: 2023-06-29 | Stop reason: HOSPADM

## 2023-06-28 RX ORDER — NALOXONE HCL 0.4 MG/ML
0.02 VIAL (ML) INJECTION
Status: DISCONTINUED | OUTPATIENT
Start: 2023-06-28 | End: 2023-06-29 | Stop reason: HOSPADM

## 2023-06-28 RX ORDER — DILTIAZEM HYDROCHLORIDE 5 MG/ML
5 INJECTION INTRAVENOUS
Status: DISPENSED | OUTPATIENT
Start: 2023-06-28 | End: 2023-06-28

## 2023-06-28 RX ORDER — GLUCAGON 1 MG
1 KIT INJECTION
Status: DISCONTINUED | OUTPATIENT
Start: 2023-06-28 | End: 2023-06-29 | Stop reason: HOSPADM

## 2023-06-28 RX ORDER — IBUPROFEN 200 MG
24 TABLET ORAL
Status: DISCONTINUED | OUTPATIENT
Start: 2023-06-28 | End: 2023-06-29 | Stop reason: HOSPADM

## 2023-06-28 RX ORDER — MAG HYDROX/ALUMINUM HYD/SIMETH 200-200-20
30 SUSPENSION, ORAL (FINAL DOSE FORM) ORAL 4 TIMES DAILY PRN
Status: DISCONTINUED | OUTPATIENT
Start: 2023-06-28 | End: 2023-06-29 | Stop reason: HOSPADM

## 2023-06-28 RX ORDER — SIMETHICONE 80 MG
1 TABLET,CHEWABLE ORAL 4 TIMES DAILY PRN
Status: DISCONTINUED | OUTPATIENT
Start: 2023-06-28 | End: 2023-06-29 | Stop reason: HOSPADM

## 2023-06-28 RX ORDER — AMOXICILLIN 250 MG
1 CAPSULE ORAL 2 TIMES DAILY
Status: DISCONTINUED | OUTPATIENT
Start: 2023-06-28 | End: 2023-06-29 | Stop reason: HOSPADM

## 2023-06-28 RX ORDER — ONDANSETRON 2 MG/ML
4 INJECTION INTRAMUSCULAR; INTRAVENOUS EVERY 8 HOURS PRN
Status: DISCONTINUED | OUTPATIENT
Start: 2023-06-28 | End: 2023-06-29 | Stop reason: HOSPADM

## 2023-06-28 RX ADMIN — SENNOSIDES AND DOCUSATE SODIUM 1 TABLET: 50; 8.6 TABLET ORAL at 09:06

## 2023-06-28 RX ADMIN — DILTIAZEM HYDROCHLORIDE 5 MG: 5 INJECTION INTRAVENOUS at 07:06

## 2023-06-28 RX ADMIN — SODIUM CHLORIDE 250 ML: 0.9 INJECTION, SOLUTION INTRAVENOUS at 07:06

## 2023-06-28 RX ADMIN — DIGOXIN 250 MCG: 0.25 INJECTION INTRAMUSCULAR; INTRAVENOUS at 07:06

## 2023-06-28 RX ADMIN — MAGNESIUM SULFATE 2 G: 2 INJECTION INTRAVENOUS at 11:06

## 2023-06-28 NOTE — HPI
Tyra Isaac is an 84 y/o F with a past medical history significant for HTN, HLD, depression, atrial fibrillation on warfarin, and ESRD on HD MWF who presented to the ED with c/o chest tightness and SOB which began around 5am.  She went to her regularly scheduled dialysis but after being there about 15 minutes, it was decided to send her to the ED 2/2 continued chest pain.  EMS noted that the patient was in afib with RVR, and upon arrival to the ED, she was in continued afib with a rate of 190.  She was given Diltiazem IV 5mg and digoxin 0.25 mg IV and shortly afterward converted to sinus rhythm.  Denies recent fevers, abdominal pain, N/V/D or any other symptoms.  CXR was negative for acute findings.  She is placed in observation under the service of hospital medicine for continued monitoring and treatment.

## 2023-06-28 NOTE — H&P
[facility] Medicine  History & Physical    Patient Name: Tyra Isaac  MRN: [id number]  Patient Class: OP- Observation  Admission Date: 6/28/2023  Attending Physician: Ayaz Amado MD   Primary Care Provider: Kalpesh Goel MD         Patient information was obtained from patient, past medical records and ER records.     Subjective:     Principal Problem:Atrial fibrillation with RVR    Chief Complaint:   Chief Complaint   Patient presents with    Chest Pain     Onset around 0515, went to dialysis and after aprox 15 min the machine was stopped and EMS called.  EMS found a-fib RVR.  Pt states she is seen by Dr. Rubio        HPI: Tyra Isaac is an 84 y/o F with a past medical history significant for HTN, HLD, depression, atrial fibrillation on warfarin, and ESRD on HD MWF who presented to the ED with c/o chest tightness and SOB which began around 5am.  She went to her regularly scheduled dialysis but after being there about 15 minutes, it was decided to send her to the ED 2/2 continued chest pain.  EMS noted that the patient was in afib with RVR, and upon arrival to the ED, she was in continued afib with a rate of 190.  She was given Diltiazem IV 5mg and digoxin 0.25 mg IV and shortly afterward converted to sinus rhythm.  Denies recent fevers, abdominal pain, N/V/D or any other symptoms.  CXR was negative for acute findings.  She is placed in observation under the service of hospital medicine for continued monitoring and treatment.          Past Medical History:   Diagnosis Date    A-fib     Anxiety     Depression     Disorder of kidney and ureter     Encephalopathy acute 1/1/2018    End stage kidney disease 6/17/2017    Gout     Hyperlipidemia     Hypertension     Moderate episode of recurrent major depressive disorder 1/17/2018    Nephropathy hypertensive, stage 5 chronic kidney disease or end stage renal disease 6/17/2017    Obstructive pattern present on  pulmonary function testing 7/28/2021    Shows moderate obstruction.    Osteopenia of multiple sites 3/9/2018    Based upon bone density measurements. Patient also has chronic kidney disease.    Stroke 11/2016       Past Surgical History:   Procedure Laterality Date    ANGIOGRAM, CORONARY, WITH LEFT HEART CATHETERIZATION N/A 2/7/2022    Procedure: Angiogram, Coronary, with Left Heart Cath;  Surgeon: Gino Leal MD;  Location: [facility] CATH/EP LAB;  Service: Cardiology;  Laterality: N/A;    CARDIAC SURGERY      stents    EYE SURGERY      WRIST SURGERY         Review of patient's allergies indicates:   Allergen Reactions    Cyclobenzaprine     Fish containing products Hives    Peanut Other (See Comments)    Tramadol Itching       No current facility-administered medications on file prior to encounter.     Current Outpatient Medications on File Prior to Encounter   Medication Sig    atorvastatin (LIPITOR) 40 MG tablet Take 40 mg by mouth once daily.    b complex vitamins tablet Take 1 tablet by mouth once daily.    calcium acetate,phosphat bind, (PHOSLO) 667 mg tablet Take 667 mg by mouth 2 (two) times daily with meals.    diltiaZEM (CARDIZEM CD) 120 MG Cp24 Take 1 capsule (120 mg total) by mouth once daily. (Patient taking differently: Take 120 mg by mouth 2 (two) times a day.)    docusate sodium (COLACE) 100 MG capsule Take 100 mg by mouth once daily.    warfarin (COUMADIN) 3 MG tablet Take 3 mg by mouth every Mon, Wed, Fri.    warfarin (COUMADIN) 3 MG tablet Take 1.5 mg by mouth every Tuesday, Thursday, Saturday, Sunday.    amiodarone (PACERONE) 100 MG Tab Take by mouth once daily.    diltiaZEM (CARDIZEM CD) 120 MG Cp24 Take 180 mg by mouth 2 (two) times a day.    [DISCONTINUED] dorzolamide-timolol 2-0.5% (COSOPT) 22.3-6.8 mg/mL ophthalmic solution Place 1 drop into both eyes 2 (two) times daily.     [DISCONTINUED] famotidine (PEPCID) 20 MG tablet Take 1 tablet (20 mg total) by mouth nightly as  needed for Heartburn.    [DISCONTINUED] latanoprost 0.005 % ophthalmic solution Place 1 drop into both eyes every evening.     [DISCONTINUED] lidocaine 5 % Crea Apply 1 application topically every Mon, Wed, Fri.      Family History       Problem Relation (Age of Onset)    Cancer Father    Heart disease Mother          Tobacco Use    Smoking status: Never    Smokeless tobacco: Never   Substance and Sexual Activity    Alcohol use: No    Drug use: No    Sexual activity: Not Currently     Review of Systems   Constitutional:  Negative for chills and fever.   HENT:  Negative for congestion and rhinorrhea.    Respiratory:  Positive for shortness of breath. Negative for cough.    Cardiovascular:  Positive for chest pain and palpitations.   Gastrointestinal:  Negative for diarrhea, nausea and vomiting.   Genitourinary:  Negative for dysuria and urgency.   Musculoskeletal:  Negative for neck pain and neck stiffness.   Skin:  Negative for pallor and rash.   Neurological:  Negative for dizziness and weakness.   Psychiatric/Behavioral:  Negative for agitation and confusion.    All other systems reviewed and are negative.  Objective:     Vital Signs (Most Recent):  Temp: 98.8 °F (37.1 °C) (06/28/23 1650)  Pulse: 72 (06/28/23 1650)  Resp: 16 (06/28/23 1650)  BP: (!) 115/55 (06/28/23 1650)  SpO2: 99 % (06/28/23 1650) Vital Signs (24h Range):  Temp:  [98.8 °F (37.1 °C)-99.3 °F (37.4 °C)] 98.8 °F (37.1 °C)  Pulse:  [] 72  Resp:  [10-39] 16  SpO2:  [95 %-100 %] 99 %  BP: ()/(55-71) 115/55     Weight: 49.9 kg (110 lb)  Body mass index is 18.3 kg/m².     Physical Exam  Vitals and nursing note reviewed.   Constitutional:       General: She is not in acute distress.     Appearance: She is well-developed. She is not toxic-appearing.   HENT:      Head: Normocephalic and atraumatic.   Cardiovascular:      Rate and Rhythm: Normal rate and regular rhythm.   Pulmonary:      Effort: Pulmonary effort is normal. No respiratory  distress.      Breath sounds: Normal breath sounds.   Abdominal:      General: Bowel sounds are normal. There is no distension.      Palpations: Abdomen is soft.      Tenderness: There is no abdominal tenderness.   Musculoskeletal:      Cervical back: Normal range of motion and neck supple.   Skin:     General: Skin is warm and dry.      Findings: No rash.   Neurological:      General: No focal deficit present.      Mental Status: She is alert and oriented to person, place, and time.   Psychiatric:         Mood and Affect: Mood normal.         Behavior: Behavior normal.         Thought Content: Thought content normal.              Significant Labs: All pertinent labs within the past 24 hours have been reviewed.  CBC:   Recent Labs   Lab 06/28/23  0737   WBC 6.78   HGB 10.5*   HCT 33.0*        CMP:   Recent Labs   Lab 06/28/23  0737      K 3.4*      CO2 25   GLU 82   BUN 36*   CREATININE 8.1*   CALCIUM 8.5*   PROT 6.6   ALBUMIN 3.5   BILITOT 1.0   ALKPHOS 49*   AST 20   ALT 16   ANIONGAP 16       Significant Imaging: I have reviewed all pertinent imaging results/findings within the past 24 hours.    Assessment/Plan:     * Atrial fibrillation with RVR  Patient with Paroxysmal (<7 days) atrial fibrillation which is now controlled after IV diltiazem and IV digoxin in ED currently with Beta Blocker and Calcium Channel Blocker. Patient is currently in sinus rhythm.[id number] Score: 4. Anticoagulation indicated. Anticoagulation done with warfarin.    Chest pain  Likely 2/2 afib with RVR, now resolved and she is in SR.    Cardiology consult.  Monitor tele      Anemia of chronic disease  Patient's anemia is currently controlled. S/p 0 units of PRBCs.  Current CBC reviewed-   Lab Results   Component Value Date    HGB 10.5 (L) 06/28/2023    HCT 33.0 (L) 06/28/2023     (H) 06/28/2023     06/28/2023     Monitor serial CBC and transfuse if patient becomes hemodynamically unstable, symptomatic or  H/H drops below 7/21.         End stage renal disease on dialysis  Nephrology consult      Coronary artery disease of native heart with stable angina pectoris  Patient with known CAD which is now controlled Will continue Statin and monitor for S/Sx of angina/ACS. Continue to monitor on telemetry.         Moderate episode of recurrent major depressive disorder  Chronic; continue chronic SSRI        Benign hypertension with ESRD (end-stage renal disease)  Chronic, controlled.  Latest blood pressure and vitals reviewed-     Temp:  [98.8 °F (37.1 °C)-99.3 °F (37.4 °C)]   Pulse:  []   Resp:  [10-39]   BP: ()/(55-71)   SpO2:  [95 %-100 %] .   Home meds for hypertension were reviewed and noted below.   Hypertension Medications             diltiaZEM (CARDIZEM CD) 120 MG Cp24 Take 1 capsule (120 mg total) by mouth once daily.    3/7/23 1510 - Original Entry by Gladis De Leon MD  Authorizing Provider: Gladis De Leon MD             Name:  diltiaZEM (CARDIZEM CD) 120 MG Cp24 Start date:  3/7/23 End date:  --    Medication:  DILTIAZEM  MG ORAL CP24 [[id number]]    Dose:  120 mg Route:  Oral Frequency:  Daily    Sig: Take 1 capsule (120 mg total) by mouth once daily.    Instructions:  --           Changes to the order are displayed in red.  Note that there may be changes made to the order that are not shown in this report, such as changes to details about ingredients.     diltiaZEM (CARDIZEM CD) 120 MG Cp24 Take 180 mg by mouth 2 (two) times a day.    3/31/23 1134 - Original Entry by Karina Salinas  Authorizing Provider: Angela Provider             Name:  diltiaZEM (CARDIZEM CD) 120 MG Cp24 Start date:  -- End date:  --    Medication:  DILTIAZEM  MG ORAL CP24 [[id number]]    Dose:  180 mg Route:  Oral Frequency:  2 times daily    Sig: Take 180 mg by mouth 2 (two) times a day.    Instructions:  --           Changes to the order are displayed in red.  Note that there may be changes made to the order that  are not shown in this report, such as changes to details about ingredients.               While in the hospital, will manage blood pressure as follows; Continue home antihypertensive regimen    Will utilize p.r.n. blood pressure medication only if patient's blood pressure greater than 180/110 and she develops symptoms such as worsening chest pain or shortness of breath.          VTE Risk Mitigation (From admission, onward)    None               On 06/28/2023, patient should be placed in hospital observation services under my care in collaboration with Dr. Ayaz Amado.      Zarina Suarez, KELBY  Department of Hospital Medicine  Formerly [facility], [facility]

## 2023-06-28 NOTE — ASSESSMENT & PLAN NOTE
Patient with Paroxysmal (<7 days) atrial fibrillation which is now controlled after IV diltiazem and IV digoxin in ED currently with Beta Blocker and Calcium Channel Blocker. Patient is currently in sinus rhythm.[id number] Score: 4. Anticoagulation indicated. Anticoagulation done with warfarin.

## 2023-06-28 NOTE — CONSULTS
INPATIENT NEPHROLOGY CONSULT   [facility]    Patient Name: Tyra Isaac  Date: 06/28/2023    Reason for consultation: ESRD    Chief Complaint:   Chief Complaint   Patient presents with    Chest Pain     Onset around 0515, went to dialysis and after aprox 15 min the machine was stopped and EMS called.  EMS found a-fib RVR.  Pt states she is seen by Dr. Rubio       History of Present Illness:  83-year-old female who has a history of end-stage renal disease on hemodialysis MWF, hyperlipidemia, hypertension, depression, AFib, began this morning with tachycardia shortness of breath along with chest pain at approximally 5:15 a.m..  Patient went to dialysis and after being on  dialysis  for 15 minutes, it was decided to send the patient to the emergency room. EN route to the hospital she was noted to have been in atrial fib with RVR by EMS.  By the time the patient arrived in the emergency room her rate was still fast however she had no complaints of any shortness of breath or chest pain.  No nausea vomiting.  No fever or chills.  Patient states that she does still make urine.  Patient denies having taken any of her routine medication this morning and states that she normally takes it at noon on the day of dialysis.  Her nephrologist is Dr. Valero.  Her cardiologist is Dr. Thompson.  The patient is on warfarin as well as sotalol. Patient treated medically for AF with RVR- converted to NSR in th ER- no complaints. Consulted for dialysis.    Interval History:  6/28- will do HD 2nd shift today    Plan of Care:    Assessment:  ESRD on HD MWF  HTN  AF with RVR  Hypokalemia  HypoMg  SHPT  Anemia of CKD    Plan:    - ordered HD today  - UF 1-3L today  - in NSR- treated medically- f/u cards eval  - adjust dialysate for hypoK  - ordered IV Mg repletion  - resume phos binder  - ordered BRAYAN with HD    Thank you for allowing us to participate in this patient's care. We will continue to follow.    Vital  Signs:  Temp Readings from Last 3 Encounters:   06/28/23 99.3 °F (37.4 °C) (Oral)   04/01/23 97.3 °F (36.3 °C) (Oral)   03/21/23 98.5 °F (36.9 °C) (Oral)       Pulse Readings from Last 3 Encounters:   06/28/23 83   04/01/23 (!) 51   03/29/23 (!) 52       BP Readings from Last 3 Encounters:   06/28/23 130/64   04/01/23 (!) 119/50   03/29/23 (!) 143/62       Weight:  Wt Readings from Last 3 Encounters:   06/28/23 49.9 kg (110 lb)   03/31/23 51.3 kg (113 lb)   03/29/23 53.1 kg (117 lb)       Past Medical & Surgical History:  Past Medical History:   Diagnosis Date    A-fib     Anxiety     Depression     Disorder of kidney and ureter     Encephalopathy acute 1/1/2018    End stage kidney disease 6/17/2017    Gout     Hyperlipidemia     Hypertension     Moderate episode of recurrent major depressive disorder 1/17/2018    Nephropathy hypertensive, 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    Osteopenia of multiple sites 3/9/2018    Based upon bone density measurements. Patient also has chronic kidney disease.    Stroke 11/2016       Past Surgical History:   Procedure Laterality Date    ANGIOGRAM, CORONARY, WITH LEFT HEART CATHETERIZATION N/A 2/7/2022    Procedure: Angiogram, Coronary, with Left Heart Cath;  Surgeon: Gino Leal MD;  Location: [facility] CATH/EP LAB;  Service: Cardiology;  Laterality: N/A;    CARDIAC SURGERY      stents    EYE SURGERY      WRIST SURGERY         Past Social History:  Social History     Socioeconomic History    Marital status:      Spouse name: Aria Isaac    Number of children: 3   Occupational History    Occupation: Not working   Tobacco Use    Smoking status: Never    Smokeless tobacco: Never   Substance and Sexual Activity    Alcohol use: No    Drug use: No    Sexual activity: Not Currently     Social Determinants of Health     Transportation  Needs: Unmet Transportation Needs    Lack of Transportation (Medical): Yes    Lack of Transportation (Non-Medical): No       Medications:  Scheduled Meds:   diltiaZEM  5 mg Intravenous ED 1 Time     Continuous Infusions:  PRN Meds:.  No current facility-administered medications on file prior to encounter.     Current Outpatient Medications on File Prior to Encounter   Medication Sig Dispense Refill    amiodarone (PACERONE) 100 MG Tab Take by mouth once daily.      atorvastatin (LIPITOR) 40 MG tablet Take 40 mg by mouth once daily.      b complex vitamins tablet Take 1 tablet by mouth once daily.      calcium acetate,phosphat bind, (PHOSLO) 667 mg tablet Take 667 mg by mouth 2 (two) times daily with meals.      diltiaZEM (CARDIZEM CD) 120 MG Cp24 Take 1 capsule (120 mg total) by mouth once daily.      diltiaZEM (CARDIZEM CD) 120 MG Cp24 Take 180 mg by mouth 2 (two) times a day.      docusate sodium (COLACE) 100 MG capsule Take 100 mg by mouth 3 (three) times a week.      dorzolamide-timolol 2-0.5% (COSOPT) 22.3-6.8 mg/mL ophthalmic solution Place 1 drop into both eyes 2 (two) times daily.       famotidine (PEPCID) 20 MG tablet Take 1 tablet (20 mg total) by mouth nightly as needed for Heartburn. 30 tablet 5    latanoprost 0.005 % ophthalmic solution Place 1 drop into both eyes every evening.       lidocaine 5 % Crea Apply 1 application topically every Mon, Wed, Fri.       warfarin (COUMADIN) 3 MG tablet Take 3 mg by mouth every Mon, Wed, Fri.      warfarin (COUMADIN) 3 MG tablet Take 1.5 mg by mouth every Tuesday, Thursday, Saturday, Sunday.         Allergies:  Cyclobenzaprine, Fish containing products, Peanut, and Tramadol    Past Family History:  Reviewed; refer to Hospitalist Admission Note    Review of Systems:  Review of Systems - All 14 systems reviewed and negative, except as noted in HPI    Physical Exam:  General Appearance:    NAD, AAO x 3, cooperative, appears stated age   Head:    Normocephalic,  atraumatic   Eyes:    PER, EOMI, and conjunctiva/sclera clear bilaterally       Mouth:   Moist mucus membranes, no thrush or oral lesions,       normal dentition   Back:     No CVA tenderness   Lungs:     Clear to auscultation bilaterally, no wheezes, crackles,           rales or rhonchi, symmetric air movement, respirations unlabored   Chest wall:    No tenderness or deformity   Heart:    Regular rate and rhythm, S1 and S2 normal, no murmur, rub   or gallop   Abdomen:     Soft, non-tender, non-distended, bowel sounds active all four   quadrants, no RT or guarding, no masses, no organomegaly   Extremities:   Warm and well perfused, distal pulses are intact, no             cyanosis or peripheral edema   MSK:   No joint or muscle swelling, tenderness or deformity   Skin:   Skin color, texture, turgor normal, no rashes or lesions   Neurologic/Psychiatric:   CNII-XII intact, normal strength and sensation       throughout, no asterixis; normal affect, memory, judgement     and insight      Results:  Lab Results   Component Value Date     06/28/2023    K 3.4 (L) 06/28/2023     06/28/2023    CO2 25 06/28/2023    BUN 36 (H) 06/28/2023    CREATININE 8.1 (H) 06/28/2023    CALCIUM 8.5 (L) 06/28/2023    ANIONGAP 16 06/28/2023    ESTGFRAFRICA 4.7 (A) 02/08/2022    EGFRNONAA 4.0 (A) 02/08/2022       Lab Results   Component Value Date    CALCIUM 8.5 (L) 06/28/2023    PHOS 3.9 03/07/2023       Recent Labs   Lab 06/28/23  0737   WBC 6.78   RBC 3.31*   HGB 10.5*   HCT 33.0*      *   MCH 31.7*   MCHC 31.8*       I have personally reviewed pertinent radiological imaging and reports.    I have spent > 70 minutes providing care for this patient for the above diagnoses. These services have included chart/data/imaging review, evaluation, exam, formulation of plan, , note preparation, and discussions with staff involved in this patient's care.    Rose Maynard MD MPH  [facility] [place]  [address] [place] [address]  [phone] (p)  [phone] (f)

## 2023-06-28 NOTE — ASSESSMENT & PLAN NOTE
Chronic, controlled.  Latest blood pressure and vitals reviewed-     Temp:  [98.8 °F (37.1 °C)-99.3 °F (37.4 °C)]   Pulse:  []   Resp:  [10-39]   BP: ()/(55-71)   SpO2:  [95 %-100 %] .   Home meds for hypertension were reviewed and noted below.   Hypertension Medications             diltiaZEM (CARDIZEM CD) 120 MG Cp24 Take 1 capsule (120 mg total) by mouth once daily.    3/7/23 1510 - Original Entry by Gladis De Leon MD  Authorizing Provider: Gladis De Leon MD             Name:  diltiaZEM (CARDIZEM CD) 120 MG Cp24 Start date:  3/7/23 End date:  --    Medication:  DILTIAZEM  MG ORAL CP24 [[id number]]    Dose:  120 mg Route:  Oral Frequency:  Daily    Sig: Take 1 capsule (120 mg total) by mouth once daily.    Instructions:  --           Changes to the order are displayed in red.  Note that there may be changes made to the order that are not shown in this report, such as changes to details about ingredients.     diltiaZEM (CARDIZEM CD) 120 MG Cp24 Take 180 mg by mouth 2 (two) times a day.    3/31/23 1134 - Original Entry by Karina Salinas  Authorizing Provider: Angela Provider             Name:  diltiaZEM (CARDIZEM CD) 120 MG Cp24 Start date:  -- End date:  --    Medication:  DILTIAZEM  MG ORAL CP24 [[id number]]    Dose:  180 mg Route:  Oral Frequency:  2 times daily    Sig: Take 180 mg by mouth 2 (two) times a day.    Instructions:  --           Changes to the order are displayed in red.  Note that there may be changes made to the order that are not shown in this report, such as changes to details about ingredients.               While in the hospital, will manage blood pressure as follows; Continue home antihypertensive regimen    Will utilize p.r.n. blood pressure medication only if patient's blood pressure greater than 180/110 and she develops symptoms such as worsening chest pain or shortness of breath.

## 2023-06-28 NOTE — ED PROVIDER NOTES
Encounter Date: 6/28/2023       History     Chief Complaint   Patient presents with    Chest Pain     Onset around 0515, went to dialysis and after aprox 15 min the machine was stopped and EMS called.  EMS found a-fib RVR.  Pt states she is seen by Dr. Rubio     83-year-old female who has a history of end-stage renal disease on hemodialysis, hyperlipidemia, hypertension, depression, AFib, began this morning with tachycardia shortness of breath along with chest pain at approximally 5:15 a.m..  Patient went to dialysis and after being on  dialysis  for 15 minutes, it was decided to send the patient to the emergency room.  EN route to the hospital she was noted to have been in atrial fib with RVR by EMS.  By the time the patient arrived in the emergency room her rate was still fast however she had no complaints of any shortness of breath or chest pain.  No nausea vomiting.  No fever or chills.  Patient states that she does still make urine.  Patient denies having taken any of her routine medication this morning and states that she normally takes it at noon on the day of dialysis.  Her nephrologist is Dr. Valero.  Her cardiologist is Dr. Thompson.  The patient is on warfarin as well as sotalol.      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    Osteopenia of multiple sites 3/9/2018    Based upon bone density  measurements. Patient also has chronic kidney disease.    Stroke 11/2016     Past Surgical History:   Procedure Laterality Date    ANGIOGRAM, CORONARY, WITH LEFT HEART CATHETERIZATION N/A 2/7/2022    Procedure: Angiogram, Coronary, with Left Heart Cath;  Surgeon: Gino Leal MD;  Location: [facility] CATH/EP LAB;  Service: Cardiology;  Laterality: N/A;    CARDIAC SURGERY      stents    EYE SURGERY      WRIST SURGERY       Family History   Problem Relation Age of Onset    Heart disease Mother     Cancer Father      Social History     Tobacco Use    Smoking status: Never    Smokeless tobacco: Never   Substance Use Topics    Alcohol use: No    Drug use: No     Review of Systems   Constitutional:  Negative for chills, diaphoresis and fever.   HENT:  Negative for congestion, postnasal drip, rhinorrhea, sinus pressure, sinus pain, sore throat and trouble swallowing.    Respiratory:  Positive for shortness of breath. Negative for cough, chest tightness, wheezing and stridor.    Cardiovascular:  Positive for chest pain and palpitations.   Gastrointestinal:  Negative for abdominal pain and nausea.   Genitourinary:  Positive for decreased urine volume. Negative for dysuria.   Musculoskeletal:  Negative for back pain.   Skin: Negative.  Negative for color change, pallor and rash.   Neurological:  Negative for weakness.   Hematological:  Does not bruise/bleed easily.   All other systems reviewed and are negative.      Physical Exam     Initial Vitals   BP Pulse Resp Temp SpO2   06/28/23 0717 06/28/23 0717 06/28/23 0717 06/28/23 0732 06/28/23 0717   106/67 (!) 183 18 99.3 °F (37.4 °C) 99 %      MAP       --                Physical Exam    Vitals reviewed.  Constitutional: She appears well-developed and well-nourished. She is not diaphoretic. No distress.   HENT:   Head: Normocephalic and atraumatic.   Nose: Nose normal.   Mouth/Throat: Oropharynx is clear and moist. No oropharyngeal exudate.   Eyes: Conjunctivae are normal.  Pupils are equal, round, and reactive to light.   Neck: Neck supple. No JVD present.   Normal range of motion.  Cardiovascular:  Normal rate, normal heart sounds and intact distal pulses.     Exam reveals no gallop and no friction rub.       No murmur heard.  Irregular irregular tachycardic rhythm   Pulmonary/Chest: Breath sounds normal. No stridor. No respiratory distress. She has no wheezes. She has no rhonchi. She has no rales. She exhibits no tenderness.   Abdominal: Abdomen is soft. Bowel sounds are normal. She exhibits no distension and no mass. There is no abdominal tenderness. There is no rebound and no guarding.   Musculoskeletal:         General: No tenderness or edema. Normal range of motion.      Cervical back: Normal range of motion and neck supple.      Comments: Good thrill in the upper arm     Neurological: She is alert and oriented to person, place, and time. She has normal strength. No cranial nerve deficit or sensory deficit. GCS score is 15. GCS eye subscore is 4. GCS verbal subscore is 5. GCS motor subscore is 6.   Skin: Skin is warm and dry. Capillary refill takes less than 2 seconds. No rash noted. No erythema. No pallor.   Psychiatric: She has a normal mood and affect. Her behavior is normal. Judgment and thought content normal.         ED Course   Critical Care    Date/Time: 8/1/2023 6:34 AM    Performed by: Shawn Pratt Jr., MD  Authorized by: Ayaz Amado MD  Direct patient critical care time: 20 minutes  Ordering / reviewing critical care time: 5 minutes  Documentation critical care time: 5 minutes  Consulting other physicians critical care time: 5 minutes  Total critical care time (exclusive of procedural time) : 35 minutes  Critical care was time spent personally by me on the following activities: discussions with consultants, development of treatment plan with patient or surrogate, evaluation of patient's response to treatment, examination of patient, obtaining history from  patient or surrogate, ordering and performing treatments and interventions, ordering and review of laboratory studies, ordering and review of radiographic studies, pulse oximetry and re-evaluation of patient's condition.        Labs Reviewed   CBC W/ AUTO DIFFERENTIAL - Abnormal; Notable for the following components:       Result Value    RBC 3.31 (*)     Hemoglobin 10.5 (*)     Hematocrit 33.0 (*)      (*)     MCH 31.7 (*)     MCHC 31.8 (*)     Eos # 0.8 (*)     Lymph % 15.5 (*)     Eosinophil % 11.7 (*)     All other components within normal limits   COMPREHENSIVE METABOLIC PANEL - Abnormal; Notable for the following components:    Potassium 3.4 (*)     BUN 36 (*)     Creatinine 8.1 (*)     Calcium 8.5 (*)     Alkaline Phosphatase 49 (*)     eGFR 4.5 (*)     All other components within normal limits   B-TYPE NATRIURETIC PEPTIDE - Abnormal; Notable for the following components:     (*)     All other components within normal limits   TROPONIN I HIGH SENSITIVITY - Abnormal; Notable for the following components:    Troponin I High Sensitivity 36.4 (*)     All other components within normal limits   PROTIME-INR - Abnormal; Notable for the following components:    Prothrombin Time 39.5 (*)     INR 3.9 (*)     All other components within normal limits   TROPONIN I HIGH SENSITIVITY - Abnormal; Notable for the following components:    Troponin I High Sensitivity 49.3 (*)     All other components within normal limits   MAGNESIUM   SARS-COV-2 RNA AMPLIFICATION, QUAL        ECG Results              EKG 12-lead (Final result)  Result time 06/30/23 18:18:31      Final result by Interface, Lab In [facility] (06/30/23 18:18:31)                   Narrative:    Test Reason : R00.0,    Vent. Rate : 084 BPM     Atrial Rate : 084 BPM     P-R Int : 154 ms          QRS Dur : 074 ms      QT Int : 430 ms       P-R-T Axes : 092 052 069 degrees     QTc Int : 508 ms    Normal sinus rhythm  Cannot rule out Anteroseptal infarct ,age  undetermined  Abnormal ECG  When compared with ECG of 28-JUN-2023 07:22,  Significant changes have occurred  Confirmed by Jerson Leal MD ([id number]) on 6/30/2023 6:18:24 PM    Referred By: [email or web address]   SELF           Confirmed By:Jerson Leal MD                                     EKG 12-lead (Final result)  Result time 06/30/23 18:18:27      Final result by Interface, Lab In [facility] (06/30/23 18:18:27)                   Narrative:    Test Reason : R07.9,    Vent. Rate : 181 BPM     Atrial Rate : 000 BPM     P-R Int : 000 ms          QRS Dur : 082 ms      QT Int : 218 ms       P-R-T Axes : 000 -30 171 degrees     QTc Int : 378 ms    Atrial fibrillation with rapid ventricular response  Left axis deviation  Marked ST abnormality, possible inferolateral subendocardial injury  Abnormal ECG  When compared with ECG of 28-JUN-2023 07:19,  Atrial fibrillation has replaced Sinus rhythm  Criteria for Septal infarct are no longer Present  T wave inversion more evident in Anterior-lateral leads  Confirmed by Jerson Leal MD ([id number]) on 6/30/2023 6:18:19 PM    Referred By: [email or web address]   SELF           Confirmed By:Jerson Leal MD                                  Imaging Results              X-Ray Chest AP Portable (Final result)  Result time 06/28/23 16:21:08      Final result by Shannon Gonzalez MD (06/28/23 16:21:08)                   Narrative:    EXAM:  XR Chest, 1 View    CLINICAL HISTORY:  The patient is 83 years old and is Female; chest pain Chest pain, tachycardia    TECHNIQUE:  Frontal view of the chest.    COMPARISON:  Chest radiograph 3/20/2023    FINDINGS:  LUNGS:  Unremarkable.  No consolidations.  PLEURAL SPACE:  Unremarkable.  No pleural effusions. No pneumothorax.  HEART:  Unremarkable.  Normal cardiac silhouette.  MEDIASTINUM:  Unremarkable.  BONES/JOINTS:  Unremarkable.    IMPRESSION:  No acute findings in the chest.    Electronically signed by:  Shannon Solomon MD  6/28/2023 4:21 PM CDT Workstation:  [id number]                                     Medications   diltiaZEM injection 5 mg (0 mg Intravenous Hold 6/28/23 0759)   sodium chloride 0.9% bolus 250 mL 250 mL (0 mLs Intravenous Stopped 6/28/23 0846)   diltiaZEM injection 5 mg (5 mg Intravenous Given 6/28/23 0739)   digoxin injection 250 mcg (250 mcg Intravenous Given 6/28/23 0748)   magnesium sulfate 2g in water 50mL IVPB (premix) (0 g Intravenous Stopped 6/28/23 1308)                Attending Attestation:             Attending ED Notes:   ED course and MDM:  This 83-year-old female who has a history of chronic kidney disease on hemodialysis was brought in for complaints of chest pain and shortness of breath and noted to have been in AFib with RVR.  During the ED course the patient had a saline lock established and was given 250 cc of saline in light of her systolic pressure being only slightly more than 100.  She then received 5 mg of Cardizem 0.25 mg of digoxin and shortly afterwards converted to sinus rhythm.  Screening labs obtained showed a troponin of 36.4.  The potassium is 3.4 and a magnesium of 1.8.  INR is 3.9 for she is on warfarin.  CBC had a white count of 6.78 and an H&H of 10.5 and 33.  Chest x-ray was unremarkable.  EKG showed a sinus rhythm with anteroseptal scar.  During the ED course I did speak to Dr. Maynard who has plans on dialyzing the patient.  Hospital Medicine will be consulted for an observation admission.                   Clinical Impression:   Final diagnoses:  [R07.9] Chest pain  [R00.0] Tachycardia  [R07.9] Chest pain, unspecified type (Primary)  [R06.00] Dyspnea, unspecified type  [I48.91] Atrial fibrillation, unspecified type  [N18.6, Z99.2] Stage 5 chronic kidney disease on chronic dialysis        ED Disposition Condition    Observation Stable                Shawn Pratt Jr., MD  06/28/23 1009       Shawn Pratt Jr., MD  08/01/23 9502

## 2023-06-28 NOTE — ASSESSMENT & PLAN NOTE
Patient's anemia is currently controlled. S/p 0 units of PRBCs.  Current CBC reviewed-   Lab Results   Component Value Date    HGB 10.5 (L) 06/28/2023    HCT 33.0 (L) 06/28/2023     (H) 06/28/2023     06/28/2023     Monitor serial CBC and transfuse if patient becomes hemodynamically unstable, symptomatic or H/H drops below 7/21.

## 2023-06-28 NOTE — PHARMACY MED REC
"Admission Medication History     The home medication history was taken by Evan Mendosa.    You may go to "Admission" then "Reconcile Home Medications" tabs to review and/or act upon these items.     The home medication list has been updated by the Pharmacy department.   Please read ALL comments highlighted in yellow.   Please address this information as you see fit.    Feel free to contact us if you have any questions or require assistance.      The medications listed below were removed from the home medication list. Please reorder if appropriate:  Patient reports no longer taking the following medication(s):  Cosopt Ophthalmic  Famotidine 20 mg  Latanoprost 0.005%Ophthalmic  Lidocaine 5% Cream    Medications listed below were obtained from: Patient/family and Analytic software- [employer]  No current facility-administered medications on file prior to encounter.     Current Outpatient Medications on File Prior to Encounter   Medication Sig Dispense Refill    atorvastatin (LIPITOR) 40 MG tablet Take 40 mg by mouth once daily.      b complex vitamins tablet Take 1 tablet by mouth once daily.      calcium acetate,phosphat bind, (PHOSLO) 667 mg tablet Take 667 mg by mouth 2 (two) times daily with meals.      diltiaZEM (CARDIZEM CD) 120 MG Cp24 Take 1 capsule (120 mg total) by mouth once daily. (Patient taking differently: Take 120 mg by mouth 2 (two) times a day.)      docusate sodium (COLACE) 100 MG capsule Take 100 mg by mouth once daily.      warfarin (COUMADIN) 3 MG tablet Take 3 mg by mouth every Mon, Wed, Fri.      warfarin (COUMADIN) 3 MG tablet Take 1.5 mg by mouth every Tuesday, Thursday, Saturday, Sunday.      amiodarone (PACERONE) 100 MG Tab Take by mouth once daily.      diltiaZEM (CARDIZEM CD) 120 MG Cp24 Take 180 mg by mouth 2 (two) times a day.      [DISCONTINUED] dorzolamide-timolol 2-0.5% (COSOPT) 22.3-6.8 mg/mL ophthalmic solution Place 1 drop into both eyes 2 (two) times daily.       [DISCONTINUED] " famotidine (PEPCID) 20 MG tablet Take 1 tablet (20 mg total) by mouth nightly as needed for Heartburn. 30 tablet 5    [DISCONTINUED] latanoprost 0.005 % ophthalmic solution Place 1 drop into both eyes every evening.       [DISCONTINUED] lidocaine 5 % Crea Apply 1 application topically every Mon, Wed, Fri.            Evan Mendosa  EXT 1924                 .

## 2023-06-28 NOTE — NURSING
Consent and Hep b status verified, removed 1500 uf net, no issues with access, post thrill and bruit noted, patient stable throughout treatment. Educated patient with HD treatment. Report given to Charge, Rn     06/28/23 3508   Handoff Report   Received From Mary Carmen   Treatment Type   Treatment Type Acute   Vital Signs   Temp 98.8 °F (37.1 °C)   Temp Source Oral   Pulse 72   Heart Rate Source Monitor   Resp 16   SpO2 99 %   Pulse Oximetry Type Intermittent   Device (Oxygen Therapy) room air   BP (!) 115/55   BP Location Right arm   BP Method Automatic   Assessments (Pre/Post)   Consent Obtained yes   Safety vein preservation armband present   Date Hepatitis Profile Obtained 03/06/23   Blood Liters Processed (BLP) 71.5   Transport Modality stretcher   Level of Consciousness (AVPU) alert   Dialyzer Clearance mildly streaked   Pain   Preferred Pain Scale number (Numeric Rating Pain Scale)   Comfort/Acceptable Pain Level 0   Pain Rating (0-10): Rest 0   Pain Rating (0-10): Activity 0   Pain Management Interventions quiet environment facilitated;pillow support provided;position adjusted   Pain/Comfort Interventions   Fever Reduction/Comfort Measures lightweight clothing;lightweight bedding   Pre-Hemodialysis Assessment   Additional Dialysis Information Needed Yes   Patient Status Departed   Treatment Consent Verified Yes   Treatment Status Completed        Hemodialysis AV Fistula Right upper arm   No Placement Date or Time found.   Present Prior to Hospital Arrival?: Yes  Location: Right upper arm   Site Assessment Clean;Dry;Intact   Patency Present;Thrill;Bruit   Status Deaccessed   Dressing Intervention First dressing   Dressing Status Clean;Dry;Intact   Site Condition No complications   Dressing Gauze   Post-Hemodialysis Assessment   Rinseback Volume (mL) 250 mL   Blood Volume Processed (Liters) 71.5 L   Dialyzer Clearance Lightly streaked   Duration of Treatment 180 minutes   Additional Fluid Intake (mL) 500 mL    Total UF (mL) 2000 mL   Net Fluid Removal 1500   Patient Response to Treatment [email or web address]   Post-Treatment Weight 48.4 kg (106 lb 11.2 oz)   Treatment Weight Change -1.5   Arterial bleeding stop time (min) 5 min   Venous bleeding stop time (min) 5 min   Post-Hemodialysis Comments stable

## 2023-06-28 NOTE — SUBJECTIVE & OBJECTIVE
Past Medical History:   Diagnosis Date    A-fib     Anxiety     Depression     Disorder of kidney and ureter     Encephalopathy acute 1/1/2018    End stage kidney disease 6/17/2017    Gout     Hyperlipidemia     Hypertension     Moderate episode of recurrent major depressive disorder 1/17/2018    Nephropathy hypertensive, 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    Osteopenia of multiple sites 3/9/2018    Based upon bone density measurements. Patient also has chronic kidney disease.    Stroke 11/2016       Past Surgical History:   Procedure Laterality Date    ANGIOGRAM, CORONARY, WITH LEFT HEART CATHETERIZATION N/A 2/7/2022    Procedure: Angiogram, Coronary, with Left Heart Cath;  Surgeon: Gino Leal MD;  Location: [facility] CATH/EP LAB;  Service: Cardiology;  Laterality: N/A;    CARDIAC SURGERY      stents    EYE SURGERY      WRIST SURGERY         Review of patient's allergies indicates:   Allergen Reactions    Cyclobenzaprine     Fish containing products Hives    Peanut Other (See Comments)    Tramadol Itching       No current facility-administered medications on file prior to encounter.     Current Outpatient Medications on File Prior to Encounter   Medication Sig    atorvastatin (LIPITOR) 40 MG tablet Take 40 mg by mouth once daily.    b complex vitamins tablet Take 1 tablet by mouth once daily.    calcium acetate,phosphat bind, (PHOSLO) 667 mg tablet Take 667 mg by mouth 2 (two) times daily with meals.    diltiaZEM (CARDIZEM CD) 120 MG Cp24 Take 1 capsule (120 mg total) by mouth once daily. (Patient taking differently: Take 120 mg by mouth 2 (two) times a day.)    docusate sodium (COLACE) 100 MG capsule Take 100 mg by mouth once daily.    warfarin (COUMADIN) 3 MG tablet Take 3 mg by mouth every Mon, Wed, Fri.    warfarin (COUMADIN) 3 MG tablet Take 1.5 mg by mouth every Tuesday,  Thursday, Saturday, Sunday.    amiodarone (PACERONE) 100 MG Tab Take by mouth once daily.    diltiaZEM (CARDIZEM CD) 120 MG Cp24 Take 180 mg by mouth 2 (two) times a day.    [DISCONTINUED] dorzolamide-timolol 2-0.5% (COSOPT) 22.3-6.8 mg/mL ophthalmic solution Place 1 drop into both eyes 2 (two) times daily.     [DISCONTINUED] famotidine (PEPCID) 20 MG tablet Take 1 tablet (20 mg total) by mouth nightly as needed for Heartburn.    [DISCONTINUED] latanoprost 0.005 % ophthalmic solution Place 1 drop into both eyes every evening.     [DISCONTINUED] lidocaine 5 % Crea Apply 1 application topically every Mon, Wed, Fri.      Family History       Problem Relation (Age of Onset)    Cancer Father    Heart disease Mother          Tobacco Use    Smoking status: Never    Smokeless tobacco: Never   Substance and Sexual Activity    Alcohol use: No    Drug use: No    Sexual activity: Not Currently     Review of Systems   Constitutional:  Negative for chills and fever.   HENT:  Negative for congestion and rhinorrhea.    Respiratory:  Positive for shortness of breath. Negative for cough.    Cardiovascular:  Positive for chest pain and palpitations.   Gastrointestinal:  Negative for diarrhea, nausea and vomiting.   Genitourinary:  Negative for dysuria and urgency.   Musculoskeletal:  Negative for neck pain and neck stiffness.   Skin:  Negative for pallor and rash.   Neurological:  Negative for dizziness and weakness.   Psychiatric/Behavioral:  Negative for agitation and confusion.    All other systems reviewed and are negative.  Objective:     Vital Signs (Most Recent):  Temp: 98.8 °F (37.1 °C) (06/28/23 1650)  Pulse: 72 (06/28/23 1650)  Resp: 16 (06/28/23 1650)  BP: (!) 115/55 (06/28/23 1650)  SpO2: 99 % (06/28/23 1650) Vital Signs (24h Range):  Temp:  [98.8 °F (37.1 °C)-99.3 °F (37.4 °C)] 98.8 °F (37.1 °C)  Pulse:  [] 72  Resp:  [10-39] 16  SpO2:  [95 %-100 %] 99 %  BP: ()/(55-71) 115/55     Weight: 49.9 kg (110  lb)  Body mass index is 18.3 kg/m².     Physical Exam  Vitals and nursing note reviewed.   Constitutional:       General: She is not in acute distress.     Appearance: She is well-developed. She is not toxic-appearing.   HENT:      Head: Normocephalic and atraumatic.   Cardiovascular:      Rate and Rhythm: Normal rate and regular rhythm.   Pulmonary:      Effort: Pulmonary effort is normal. No respiratory distress.      Breath sounds: Normal breath sounds.   Abdominal:      General: Bowel sounds are normal. There is no distension.      Palpations: Abdomen is soft.      Tenderness: There is no abdominal tenderness.   Musculoskeletal:      Cervical back: Normal range of motion and neck supple.   Skin:     General: Skin is warm and dry.      Findings: No rash.   Neurological:      General: No focal deficit present.      Mental Status: She is alert and oriented to person, place, and time.   Psychiatric:         Mood and Affect: Mood normal.         Behavior: Behavior normal.         Thought Content: Thought content normal.              Significant Labs: All pertinent labs within the past 24 hours have been reviewed.  CBC:   Recent Labs   Lab 06/28/23  0737   WBC 6.78   HGB 10.5*   HCT 33.0*        CMP:   Recent Labs   Lab 06/28/23  0737      K 3.4*      CO2 25   GLU 82   BUN 36*   CREATININE 8.1*   CALCIUM 8.5*   PROT 6.6   ALBUMIN 3.5   BILITOT 1.0   ALKPHOS 49*   AST 20   ALT 16   ANIONGAP 16       Significant Imaging: I have reviewed all pertinent imaging results/findings within the past 24 hours.

## 2023-06-28 NOTE — ASSESSMENT & PLAN NOTE
Patient with known CAD which is now controlled Will continue Statin and monitor for S/Sx of angina/ACS. Continue to monitor on telemetry.

## 2023-06-29 VITALS
RESPIRATION RATE: 18 BRPM | TEMPERATURE: 98 F | DIASTOLIC BLOOD PRESSURE: 73 MMHG | BODY MASS INDEX: 18.33 KG/M2 | HEIGHT: 65 IN | SYSTOLIC BLOOD PRESSURE: 164 MMHG | OXYGEN SATURATION: 97 % | HEART RATE: 82 BPM | WEIGHT: 110 LBS

## 2023-06-29 LAB
ALBUMIN SERPL BCP-MCNC: 3.1 G/DL (ref 3.5–5.2)
ALP SERPL-CCNC: 53 U/L (ref 55–135)
ALT SERPL W/O P-5'-P-CCNC: 18 U/L (ref 10–44)
ANION GAP SERPL CALC-SCNC: 6 MMOL/L (ref 8–16)
AST SERPL-CCNC: 16 U/L (ref 10–40)
BASOPHILS # BLD AUTO: 0.04 K/UL (ref 0–0.2)
BASOPHILS NFR BLD: 0.6 % (ref 0–1.9)
BILIRUB SERPL-MCNC: 0.8 MG/DL (ref 0.1–1)
BUN SERPL-MCNC: 21 MG/DL (ref 8–23)
CALCIUM SERPL-MCNC: 8.5 MG/DL (ref 8.7–10.5)
CHLORIDE SERPL-SCNC: 109 MMOL/L (ref 95–110)
CO2 SERPL-SCNC: 27 MMOL/L (ref 23–29)
CREAT SERPL-MCNC: 5.3 MG/DL (ref 0.5–1.4)
DIFFERENTIAL METHOD: ABNORMAL
EOSINOPHIL # BLD AUTO: 1.1 K/UL (ref 0–0.5)
EOSINOPHIL NFR BLD: 17.2 % (ref 0–8)
ERYTHROCYTE [DISTWIDTH] IN BLOOD BY AUTOMATED COUNT: 14.3 % (ref 11.5–14.5)
EST. GFR  (NO RACE VARIABLE): 7.6 ML/MIN/1.73 M^2
GLUCOSE SERPL-MCNC: 75 MG/DL (ref 70–110)
HCT VFR BLD AUTO: 31.4 % (ref 37–48.5)
HGB BLD-MCNC: 9.6 G/DL (ref 12–16)
IMM GRANULOCYTES # BLD AUTO: 0.02 K/UL (ref 0–0.04)
IMM GRANULOCYTES NFR BLD AUTO: 0.3 % (ref 0–0.5)
INR PPP: 2.6 (ref 0.8–1.2)
LYMPHOCYTES # BLD AUTO: 0.9 K/UL (ref 1–4.8)
LYMPHOCYTES NFR BLD: 15 % (ref 18–48)
MAGNESIUM SERPL-MCNC: 2.2 MG/DL (ref 1.6–2.6)
MCH RBC QN AUTO: 30.7 PG (ref 27–31)
MCHC RBC AUTO-ENTMCNC: 30.6 G/DL (ref 32–36)
MCV RBC AUTO: 100 FL (ref 82–98)
MONOCYTES # BLD AUTO: 1 K/UL (ref 0.3–1)
MONOCYTES NFR BLD: 15.4 % (ref 4–15)
NEUTROPHILS # BLD AUTO: 3.2 K/UL (ref 1.8–7.7)
NEUTROPHILS NFR BLD: 51.5 % (ref 38–73)
NRBC BLD-RTO: 0 /100 WBC
PHOSPHATE SERPL-MCNC: 3.2 MG/DL (ref 2.7–4.5)
PLATELET # BLD AUTO: 242 K/UL (ref 150–450)
PMV BLD AUTO: 9.3 FL (ref 9.2–12.9)
POTASSIUM SERPL-SCNC: 4.8 MMOL/L (ref 3.5–5.1)
PROT SERPL-MCNC: 6.2 G/DL (ref 6–8.4)
PROTHROMBIN TIME: 27.2 SEC (ref 9–12.5)
RBC # BLD AUTO: 3.13 M/UL (ref 4–5.4)
SODIUM SERPL-SCNC: 142 MMOL/L (ref 136–145)
WBC # BLD AUTO: 6.22 K/UL (ref 3.9–12.7)

## 2023-06-29 PROCEDURE — 80053 COMPREHEN METABOLIC PANEL: CPT | Performed by: NURSE PRACTITIONER

## 2023-06-29 PROCEDURE — 94761 N-INVAS EAR/PLS OXIMETRY MLT: CPT

## 2023-06-29 PROCEDURE — 36415 COLL VENOUS BLD VENIPUNCTURE: CPT | Performed by: NURSE PRACTITIONER

## 2023-06-29 PROCEDURE — 84100 ASSAY OF PHOSPHORUS: CPT | Performed by: NURSE PRACTITIONER

## 2023-06-29 PROCEDURE — 83735 ASSAY OF MAGNESIUM: CPT | Performed by: NURSE PRACTITIONER

## 2023-06-29 PROCEDURE — 85025 COMPLETE CBC W/AUTO DIFF WBC: CPT | Performed by: NURSE PRACTITIONER

## 2023-06-29 PROCEDURE — 25000003 PHARM REV CODE 250: Performed by: NURSE PRACTITIONER

## 2023-06-29 PROCEDURE — G0378 HOSPITAL OBSERVATION PER HR: HCPCS

## 2023-06-29 PROCEDURE — 85610 PROTHROMBIN TIME: CPT | Performed by: NURSE PRACTITIONER

## 2023-06-29 RX ORDER — DILTIAZEM HYDROCHLORIDE 120 MG/1
120 CAPSULE, COATED, EXTENDED RELEASE ORAL 2 TIMES DAILY
Status: ON HOLD
Start: 2023-06-29 | End: 2023-12-15 | Stop reason: HOSPADM

## 2023-06-29 RX ORDER — METOPROLOL TARTRATE 25 MG/1
25 TABLET, FILM COATED ORAL 2 TIMES DAILY
Qty: 60 TABLET | Refills: 11 | Status: ON HOLD | OUTPATIENT
Start: 2023-06-29 | End: 2023-11-14 | Stop reason: HOSPADM

## 2023-06-29 RX ORDER — ATORVASTATIN CALCIUM 40 MG/1
40 TABLET, FILM COATED ORAL DAILY
Status: DISCONTINUED | OUTPATIENT
Start: 2023-06-29 | End: 2023-06-29 | Stop reason: HOSPADM

## 2023-06-29 RX ORDER — DOCUSATE SODIUM 100 MG/1
100 CAPSULE, LIQUID FILLED ORAL DAILY
Status: DISCONTINUED | OUTPATIENT
Start: 2023-06-29 | End: 2023-06-29 | Stop reason: HOSPADM

## 2023-06-29 RX ORDER — METOPROLOL TARTRATE 25 MG/1
25 TABLET, FILM COATED ORAL 2 TIMES DAILY
Status: DISCONTINUED | OUTPATIENT
Start: 2023-06-29 | End: 2023-06-29 | Stop reason: HOSPADM

## 2023-06-29 RX ORDER — CALCIUM ACETATE 667 MG/1
667 CAPSULE ORAL DAILY
Status: DISCONTINUED | OUTPATIENT
Start: 2023-06-29 | End: 2023-06-29 | Stop reason: HOSPADM

## 2023-06-29 RX ORDER — DILTIAZEM HYDROCHLORIDE 120 MG/1
120 CAPSULE, COATED, EXTENDED RELEASE ORAL 2 TIMES DAILY
Status: DISCONTINUED | OUTPATIENT
Start: 2023-06-29 | End: 2023-06-29 | Stop reason: HOSPADM

## 2023-06-29 RX ADMIN — DILTIAZEM HYDROCHLORIDE 120 MG: 120 CAPSULE, COATED, EXTENDED RELEASE ORAL at 08:06

## 2023-06-29 RX ADMIN — SENNOSIDES AND DOCUSATE SODIUM 1 TABLET: 50; 8.6 TABLET ORAL at 08:06

## 2023-06-29 RX ADMIN — ATORVASTATIN CALCIUM 40 MG: 40 TABLET, FILM COATED ORAL at 08:06

## 2023-06-29 RX ADMIN — CALCIUM ACETATE 667 MG: 667 CAPSULE ORAL at 08:06

## 2023-06-29 RX ADMIN — DOCUSATE SODIUM 100 MG: 100 CAPSULE, LIQUID FILLED ORAL at 08:06

## 2023-06-29 NOTE — PROGRESS NOTES
INPATIENT NEPHROLOGY Progress Note  [facility]    Patient Name: Tyra Isaac  Date: 06/29/2023    Reason for consultation: ESRD    Chief Complaint:   Chief Complaint   Patient presents with    Chest Pain     Onset around 0515, went to dialysis and after aprox 15 min the machine was stopped and EMS called.  EMS found a-fib RVR.  Pt states she is seen by Dr. Rubio       History of Present Illness:  83-year-old female who has a history of end-stage renal disease on hemodialysis MWF, hyperlipidemia, hypertension, depression, AFib, began this morning with tachycardia shortness of breath along with chest pain at approximally 5:15 a.m..  Patient went to dialysis and after being on  dialysis  for 15 minutes, it was decided to send the patient to the emergency room. EN route to the hospital she was noted to have been in atrial fib with RVR by EMS.  By the time the patient arrived in the emergency room her rate was still fast however she had no complaints of any shortness of breath or chest pain.  No nausea vomiting.  No fever or chills.  Patient states that she does still make urine.  Patient denies having taken any of her routine medication this morning and states that she normally takes it at noon on the day of dialysis.  Her nephrologist is Dr. Valero.  Her cardiologist is Dr. Thompson.  The patient is on warfarin as well as sotalol. Patient treated medically for AF with RVR- converted to NSR in th ER- no complaints. Consulted for dialysis.    Interval History:  6/28- will do HD 2nd shift today  6/29- no issues with HD yest, in NSR    Plan of Care:    Assessment:  ESRD on HD MWF  HTN  AF with RVR  Hypokalemia  HypoMg  SHPT  Anemia of CKD    Plan:    - continue HD MWF  - BP/VS stable- UF 1-3L today  - in NSR- treated medically- f/u cards eval  - adjusted dialysate for hypoK- at goal  - ordered IV Mg repletion yest- at goal  - continue phos binder  - continue BRAYAN with HD    Thank you for allowing  us to participate in this patient's care. We will continue to follow.    Vital Signs:  Temp Readings from Last 3 Encounters:   06/29/23 99.4 °F (37.4 °C) (Oral)   04/01/23 97.3 °F (36.3 °C) (Oral)   03/21/23 98.5 °F (36.9 °C) (Oral)       Pulse Readings from Last 3 Encounters:   06/29/23 78   04/01/23 (!) 51   03/29/23 (!) 52       BP Readings from Last 3 Encounters:   06/29/23 (!) 134/59   04/01/23 (!) 119/50   03/29/23 (!) 143/62       Weight:  Wt Readings from Last 3 Encounters:   06/28/23 49.9 kg (110 lb)   03/31/23 51.3 kg (113 lb)   03/29/23 53.1 kg (117 lb)     Medications:  Scheduled Meds:   atorvastatin  40 mg Oral Daily    calcium acetate(phosphat bind)  667 mg Oral Daily    diltiaZEM  120 mg Oral BID    docusate sodium  100 mg Oral Daily    epoetin rosy (PROCRIT) injection  50 Units/kg Intravenous Every Mon, Wed, Fri    senna-docusate 8.6-50 mg  1 tablet Oral BID     Continuous Infusions:  PRN Meds:.  No current facility-administered medications on file prior to encounter.     Current Outpatient Medications on File Prior to Encounter   Medication Sig Dispense Refill    atorvastatin (LIPITOR) 40 MG tablet Take 40 mg by mouth once daily.      b complex vitamins tablet Take 1 tablet by mouth once daily.      calcium acetate,phosphat bind, (PHOSLO) 667 mg tablet Take 667 mg by mouth 2 (two) times daily with meals.      diltiaZEM (CARDIZEM CD) 120 MG Cp24 Take 1 capsule (120 mg total) by mouth once daily. (Patient taking differently: Take 120 mg by mouth 2 (two) times a day.)      docusate sodium (COLACE) 100 MG capsule Take 100 mg by mouth once daily.      warfarin (COUMADIN) 3 MG tablet Take 3 mg by mouth every Mon, Wed, Fri.      warfarin (COUMADIN) 3 MG tablet Take 1.5 mg by mouth every Tuesday, Thursday, Saturday, Sunday.      amiodarone (PACERONE) 100 MG Tab Take by mouth once daily.      diltiaZEM (CARDIZEM CD) 120 MG Cp24 Take 180 mg by mouth 2 (two) times a day.         Review of  Systems:  Neg    Physical Exam:  General Appearance:    NAD, AAO x 3, cooperative, appears stated age   Head:    Normocephalic, atraumatic   Eyes:    PER, EOMI, and conjunctiva/sclera clear bilaterally       Mouth:   Moist mucus membranes, no thrush or oral lesions,       normal dentition   Back:     No CVA tenderness   Lungs:     Clear to auscultation bilaterally, no wheezes, crackles,           rales or rhonchi, symmetric air movement, respirations unlabored   Chest wall:    No tenderness or deformity   Heart:    Regular rate and rhythm, S1 and S2 normal, no murmur, rub   or gallop   Abdomen:     Soft, non-tender, non-distended, bowel sounds active all four   quadrants, no RT or guarding, no masses, no organomegaly   Extremities:   Warm and well perfused, distal pulses are intact, no             cyanosis or peripheral edema   MSK:   No joint or muscle swelling, tenderness or deformity   Skin:   Skin color, texture, turgor normal, no rashes or lesions   Neurologic/Psychiatric:   CNII-XII intact, normal strength and sensation       throughout, no asterixis; normal affect, memory, judgement     and insight      Results:  Lab Results   Component Value Date     06/29/2023    K 4.8 06/29/2023     06/29/2023    CO2 27 06/29/2023    BUN 21 06/29/2023    CREATININE 5.3 (H) 06/29/2023    CALCIUM 8.5 (L) 06/29/2023    ANIONGAP 6 (L) 06/29/2023    ESTGFRAFRICA 4.7 (A) 02/08/2022    EGFRNONAA 4.0 (A) 02/08/2022       Lab Results   Component Value Date    CALCIUM 8.5 (L) 06/29/2023    PHOS 3.2 06/29/2023       Recent Labs   Lab 06/29/23  0340   WBC 6.22   RBC 3.13*   HGB 9.6*   HCT 31.4*      *   MCH 30.7   MCHC 30.6*         I have personally reviewed pertinent radiological imaging and reports.    I have spent > 35 minutes providing care for this patient for the above diagnoses. These services have included chart/data/imaging review, evaluation, exam, formulation of plan, , note  preparation, and discussions with staff involved in this patient's care.    Rose Maynard MD MPH  [facility] [address] [address]  [phone] (p)  [phone] (f)

## 2023-06-29 NOTE — PLAN OF CARE
Problem: Device-Related Complication Risk (Hemodialysis)  Goal: Safe, Effective Therapy Delivery  Outcome: Met     Problem: Hemodynamic Instability (Hemodialysis)  Goal: Effective Tissue Perfusion  Outcome: Met     Problem: Infection (Hemodialysis)  Goal: Absence of Infection Signs and Symptoms  Outcome: Met     Problem: Adult Inpatient Plan of Care  Goal: Plan of Care Review  Outcome: Met  Goal: Patient-Specific Goal (Individualized)  Outcome: Met  Goal: Absence of Hospital-Acquired Illness or Injury  Outcome: Met  Goal: Optimal Comfort and Wellbeing  Outcome: Met  Goal: Readiness for Transition of Care  Outcome: Met     Problem: Skin Injury Risk Increased  Goal: Skin Health and Integrity  Outcome: Met

## 2023-06-29 NOTE — CONSULTS
[facility]  Department of Cardiology  Consult Note      PATIENT NAME: Tyra Isaac    MRN: [id number]  TODAY'S DATE: 06/29/2023  ADMIT DATE: 6/28/2023                          CONSULT REQUESTED BY: Ayaz Amado MD    SUBJECTIVE     PRINCIPAL PROBLEM: Atrial fibrillation with RVR      REASON FOR CONSULT:    From H&P: Tyra Isaac is an 82 y/o F with a past medical history significant for HTN, HLD, depression, atrial fibrillation on warfarin, and ESRD on HD MWF who presented to the ED with c/o chest tightness and SOB which began around 5am.  She went to her regularly scheduled dialysis but after being there about 15 minutes, it was decided to send her to the ED 2/2 continued chest pain.  EMS noted that the patient was in afib with RVR, and upon arrival to the ED, she was in continued afib with a rate of 190.  She was given Diltiazem IV 5mg and digoxin 0.25 mg IV and shortly afterward converted to sinus rhythm.  Denies recent fevers, abdominal pain, N/V/D or any other symptoms.  CXR was negative for acute findings.  She is placed in observation under the service of hospital medicine for continued monitoring and treatment.         HPI:    Patient is an 83 year old female who presented to the ER with complaints of chest tightness and SOB which worsened during dialysis. Patient was noted to be in Afib with RVR up to the 190s while in the ambulance. She was given IV diltiazem and digoxin and converted back to SR. HS troponin was mildly elevated but had a negative delta. She was seen lying in bed today with no complaints.         Review of patient's allergies indicates:   Allergen Reactions    Cyclobenzaprine     Fish containing products Hives    Peanut Other (See Comments)    Tramadol Itching       Past Medical History:   Diagnosis Date    A-fib     Anxiety     Depression     Disorder of kidney and ureter     Encephalopathy acute 1/1/2018    End stage kidney disease 6/17/2017    Gout      Hyperlipidemia     Hypertension     Moderate episode of recurrent major depressive disorder 1/17/2018    Nephropathy hypertensive, 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    Osteopenia of multiple sites 3/9/2018    Based upon bone density measurements. Patient also has chronic kidney disease.    Stroke 11/2016     Past Surgical History:   Procedure Laterality Date    ANGIOGRAM, CORONARY, WITH LEFT HEART CATHETERIZATION N/A 2/7/2022    Procedure: Angiogram, Coronary, with Left Heart Cath;  Surgeon: Gino Leal MD;  Location[facility] CATH[facility]EP LAB;  Service: Cardiology;  Laterality: N/A;    CARDIAC SURGERY      stents    EYE SURGERY      WRIST SURGERY       Social History     Tobacco Use    Smoking status: Never    Smokeless tobacco: Never   Substance Use Topics    Alcohol use: No    Drug use: No        REVIEW OF SYSTEMS  Per HPI    OBJECTIVE     VITAL SIGNS (Most Recent)  Temp: 98.2 °F (36.8 °C) (06/29/23 1218)  Pulse: 82 (06/29/23 1218)  Resp: 18 (06/29/23 1218)  BP: (!) 164/73 (06/29/23 1218)  SpO2: 97 % (06/29/23 1218)    VENTILATION STATUS  Resp: 18 (06/29/23 1218)  SpO2: 97 % (06/29/23 1218)           I & O (Last 24H):  Intake/Output Summary (Last 24 hours) at 6/29/2023 1334  Last data filed at 6/29/2023 1100  Gross per 24 hour   Intake 860 ml   Output 2000 ml   Net -1140 ml       WEIGHTS  Wt Readings from Last 1 Encounters:   06/28/23 0717 49.9 kg (110 lb)       PHYSICAL EXAM  CONSTITUTIONAL: No fever, no chills  HEENT: Normocephalic, atraumatic,pupils reactive to light                 NECK:  No JVD no carotid bruit  CVS: S1S2+, RRR  LUNGS: Clear  ABDOMEN: Soft, NT, BS+  EXTREMITIES: No cyanosis, edema  : No diaz catheter  NEURO: AAO X 3  PSY: Normal affect      HOME MEDICATIONS:  No current facility-administered medications on file prior to encounter.     Current Outpatient  Medications on File Prior to Encounter   Medication Sig Dispense Refill    atorvastatin (LIPITOR) 40 MG tablet Take 40 mg by mouth once daily.      b complex vitamins tablet Take 1 tablet by mouth once daily.      calcium acetate,phosphat bind, (PHOSLO) 667 mg tablet Take 667 mg by mouth 2 (two) times daily with meals.      docusate sodium (COLACE) 100 MG capsule Take 100 mg by mouth once daily.      warfarin (COUMADIN) 3 MG tablet Take 3 mg by mouth every Mon, Wed, Fri.      warfarin (COUMADIN) 3 MG tablet Take 1.5 mg by mouth every Tuesday, Thursday, Saturday, Sunday.      [DISCONTINUED] diltiaZEM (CARDIZEM CD) 120 MG Cp24 Take 1 capsule (120 mg total) by mouth once daily. (Patient taking differently: Take 120 mg by mouth 2 (two) times a day.)      [DISCONTINUED] amiodarone (PACERONE) 100 MG Tab Take by mouth once daily.      [DISCONTINUED] diltiaZEM (CARDIZEM CD) 120 MG Cp24 Take 180 mg by mouth 2 (two) times a day.         SCHEDULED MEDS:   atorvastatin  40 mg Oral Daily    calcium acetate(phosphat bind)  667 mg Oral Daily    diltiaZEM  120 mg Oral BID    docusate sodium  100 mg Oral Daily    epoetin orsy (PROCRIT) injection  50 Units/kg Intravenous Every Mon, Wed, Fri    senna-docusate 8.6-50 mg  1 tablet Oral BID       CONTINUOUS INFUSIONS:    PRN MEDS:acetaminophen, aluminum-magnesium hydroxide-simethicone, dextrose 50%, dextrose 50%, glucagon (human recombinant), glucose, glucose, melatonin, naloxone, ondansetron, simethicone, sodium chloride 0.9%    LABS AND DIAGNOSTICS     CBC LAST 3 DAYS  Recent Labs   Lab 06/28/23  0737 06/29/23  0340   WBC 6.78 6.22   RBC 3.31* 3.13*   HGB 10.5* 9.6*   HCT 33.0* 31.4*   * 100*   MCH 31.7* 30.7   MCHC 31.8* 30.6*   RDW 14.5 14.3    242   MPV 10.1 9.3   GRAN 57.9  3.9 51.5  3.2   LYMPH 15.5*  1.1 15.0*  0.9*   MONO 14.2  1.0 15.4*  1.0   BASO 0.02 0.04   NRBC 0 0       COAGULATION LAST 3 DAYS  Recent Labs   Lab 06/28/23  0737 06/29/23  0339   INR 3.9*  2.6*       CHEMISTRY LAST 3 DAYS  Recent Labs   Lab 06/28/23  0737 06/29/23  0339    142   K 3.4* 4.8    109   CO2 25 27   ANIONGAP 16 6*   BUN 36* 21   CREATININE 8.1* 5.3*   GLU 82 75   CALCIUM 8.5* 8.5*   MG 1.8 2.2   ALBUMIN 3.5 3.1*   PROT 6.6 6.2   ALKPHOS 49* 53*   ALT 16 18   AST 20 16   BILITOT 1.0 0.8       CARDIAC PROFILE LAST 3 DAYS  Recent Labs   Lab 06/28/23  0737 06/28/23  1006 06/28/23  1949   *  --   --    TROPONINIHS 36.4* 49.3* 58.9*       ENDOCRINE LAST 3 DAYS  No results for input(s): TSH, PROCAL in the last 168 hours.    LAST ARTERIAL BLOOD GAS  ABG  No results for input(s): PH, PO2, PCO2, HCO3, BE in the last 168 hours.    LAST 7 DAYS MICROBIOLOGY   Microbiology Results (last 7 days)       ** No results found for the last 168 hours. **            MOST RECENT IMAGING  X-Ray Chest AP Portable  EXAM:  XR Chest, 1 View    CLINICAL HISTORY:  The patient is 83 years old and is Female; chest pain Chest pain, tachycardia    TECHNIQUE:  Frontal view of the chest.    COMPARISON:  Chest radiograph 3/20/2023    FINDINGS:  LUNGS:  Unremarkable.  No consolidations.  PLEURAL SPACE:  Unremarkable.  No pleural effusions. No pneumothorax.  HEART:  Unremarkable.  Normal cardiac silhouette.  MEDIASTINUM:  Unremarkable.  BONES/JOINTS:  Unremarkable.    IMPRESSION:  No acute findings in the chest.    Electronically signed by:  Shannon Solomon MD  6/28/2023 4:21 PM CDT Workstation: [id number]      ECHOCARDIOGRAM RESULTS (last 5)  Results for orders placed during the hospital encounter of 03/20/23    Echo    Interpretation Summary  · The left ventricle is normal in size with moderate concentric hypertrophy and normal systolic function.  · The estimated ejection fraction is 57%.  · Normal left ventricular diastolic function.  · Normal right ventricular size with normal right ventricular systolic function.  · Moderate to severe tricuspid regurgitation.  · Mild-to-moderate mitral regurgitation.  ·  Moderate left atrial enlargement.  · Mild right atrial enlargement.  · There is mild pulmonary hypertension.      Results for orders placed during the hospital encounter of 03/06/23    Echo    Interpretation Summary  · The left ventricle is normal in size with normal systolic function.  · The estimated ejection fraction is 60%.  · Grade II left ventricular diastolic dysfunction.  · Normal right ventricular size with normal right ventricular systolic function.  · Moderate left atrial enlargement.  · Mild-to-moderate mitral regurgitation.  · Moderate to severe tricuspid regurgitation.  · There is mild pulmonary hypertension.  · There is mild aortic valve stenosis.  · Aortic valve area is 1.78 cm2; peak velocity is 2.01 m/s; mean gradient is 9 mmHg.      Results for orders placed during the hospital encounter of 01/26/22    Echo    Interpretation Summary  · The left ventricle is normal in size with mild concentric hypertrophy and normal systolic function.  · The estimated ejection fraction is 65%.  · Grade II left ventricular diastolic dysfunction.  · Atrial fibrillation not observed.  · Normal right ventricular size with normal right ventricular systolic function.  · Moderate to severe left atrial enlargement.  · Mild right atrial enlargement.  · Mild mitral regurgitation.  · Mild to moderate tricuspid regurgitation.  · Normal central venous pressure (3 mmHg).  · The estimated PA systolic pressure is 36 mmHg.  · Mildly elevated gradient accross mitral valve of around 6 mmHg at HR of 64 bpm. MVA by pressure half time is normal, however this can be inaccurate.      Results for orders placed during the hospital encounter of 03/03/21    Echo Color Flow Doppler? Yes    Interpretation Summary  · The left ventricle is small with concentric remodeling and normal systolic function. The estimated ejection fraction is 63%  · The estimated PA systolic pressure is 38 mmHg.  · Grade II left ventricular diastolic dysfunction.  ·  Normal right ventricular size with normal right ventricular systolic function.  · Mild-to-moderate mitral regurgitation.  · Mild to moderate tricuspid regurgitation.  · Normal central venous pressure (3 mmHg).  · The patient converted from atrial fibrillation to normal sinus rhythm 03/03/2021      Results for orders placed during the hospital encounter of 12/13/19    Echo Color Flow Doppler? Yes    Interpretation Summary  · Mild concentric left ventricular hypertrophy.  · Normal left ventricular systolic function. The estimated ejection fraction is 70%  · Grade II (moderate) left ventricular diastolic dysfunction consistent with pseudonormalization.  · The estimated PA systolic pressure is 37 mm Hg  · No wall motion abnormalities.  · Normal right ventricular systolic function.  · Normal central venous pressure (3 mm Hg).  · Mild mitral sclerosis.  · Mild mitral regurgitation.  · Mild to moderate tricuspid regurgitation.      CURRENT/PREVIOUS VISIT EKG  Results for orders placed or performed during the hospital encounter of 06/28/23   EKG 12-lead    Collection Time: 06/28/23  8:10 AM    Narrative    Test Reason : R00.0,    Vent. Rate : 084 BPM     Atrial Rate : 084 BPM     P-R Int : 154 ms          QRS Dur : 074 ms      QT Int : 430 ms       P-R-T Axes : 092 052 069 degrees     QTc Int : 508 ms    Normal sinus rhythm  Cannot rule out Anteroseptal infarct ,age undetermined  Abnormal ECG  When compared with ECG of 28-JUN-2023 07:22,  Significant changes have occurred    Referred By: [email or web address]   SELF           Confirmed By:            ASSESSMENT/PLAN:     Active Hospital Problems    Diagnosis    *Atrial fibrillation with RVR    Chest pain    Anemia of chronic disease    End stage renal disease on dialysis    Coronary artery disease of native heart with stable angina pectoris    Moderate episode of recurrent major depressive disorder    Benign hypertension with ESRD (end-stage renal disease)     BP slightly  elevated1.21.15 BP elelvated. Took meds in AMI will increase amlodipine to 10 mg per day. New prescription sent to the pharmacy. She will double the existing prescription. She will continue to monitor her blood pressures.         ASSESSMENT & PLAN:     Afib with RVR  Chest pain  Anemia of chronic disease  ESRD on HD  CAD      RECOMMENDATIONS:    Repeat one more troponin level for trend downward.   Continue Cardizem  mg po BID.   Add metoprolol tartrate 25 mg po BID.   Recommend EP evaluation outpatient.   Hopeful for dc home soon.         Jacquie Carter NP  Date of Service: 06/29/2023  1:34 PM     I have personally interviewed and examined the patient. I have reviewed the Nurse Practitioner's history and physical, assessment, and plan. I agree with the findings and made appropriate changes as necessary in recommendations.      Heather Smith MD  Department of Cardiology  [facility]  06/29/2023 1:49 PM

## 2023-06-29 NOTE — PLAN OF CARE
CM faxed clinicals to [facility] [phone] for COREY tomorrow 6/30/23.     06/29/23 1314   Post-Acute Status   Post-Acute Authorization Home Health   Home Health Status Set-up Complete/Auth obtained

## 2023-06-29 NOTE — PLAN OF CARE
WARD explained, pt verbalized understanding. Form signed.     06/29/23 1242   WARD Message   Medicare Outpatient and Observation Notification regarding financial responsibility Given to patient/caregiver;Explained to patient/caregiver;Signed/date by patient/caregiver   Date WARD was signed 06/29/23   Time WARD was signed [id number]

## 2023-06-29 NOTE — PLAN OF CARE
CM requested appt from [facility] group for hospital follow up for pt, added to AVS.    Hospital follow up scheduled with Dr. Thompson on 7/10/23 @ 1:15 PM.  Added to AVS.     06/29/23 1311   Post-Acute Status   Hospital Resources/Appts/Education Provided Appointments scheduled and added to AVS

## 2023-06-29 NOTE — PLAN OF CARE
[facility]  Initial Discharge Assessment       Primary Care Provider: Kalpesh Goel MD    Admission Diagnosis: Chest pain, unspecified type [R07.9]    Admission Date: 6/28/2023  Expected Discharge Date: 6/29/2023    Transition of Care Barriers: None    Payor: [employer] MANAGED MEDICARE / Plan: [employer] MEDICARE HMO / Product Type: Capitation /     Extended Emergency Contact Information  Primary Emergency Contact: Raffi Isaac  Address: [address] [place] [address] [id number] [place] [address]  Home Phone: [phone]  Mobile Phone: [phone]  Relation: Son   needed? No    Discharge Plan A: Home Health  Discharge Plan B: Home      [id number] - [place], [place] - [address]  [address] [address]  Phone: [phone] Fax: [phone]    DC assessment completed with pt, information on facesheet verified. Lives alone. Will need transportation home. PCP verified. [facility] [address]. Takes coumain, monitored @ coumadin clinic. HH with [facility]. HD @ [facility]. DME-RW. Independent at baseline. Insurance verified. Denies POA. Denies recent admission. Planning to DC home with HH.    Initial Assessment (most recent)       Adult Discharge Assessment - 06/29/23 1228          Discharge Assessment    Assessment Type Discharge Planning Assessment     Confirmed/corrected address, phone number and insurance Yes     Confirmed Demographics Correct on Facesheet     Source of Information patient     Does patient/caregiver understand observation status Yes     Communicated ILAN with patient/caregiver Yes     People in Home alone     Facility Arrived From: home     Do you expect to return to your current living situation? Yes     Do you have help at home or someone to help you manage your care at home? No     Prior to hospitilization cognitive status: Alert/Oriented     Current cognitive status: Alert/Oriented     Equipment Currently Used  at Home walker, rolling     Readmission within 30 days? No     Patient currently being followed by outpatient case management? No     Do you currently have service(s) that help you manage your care at home? Yes     Name and Contact number of agency [facility]     Is the pt/caregiver preference to resume services with current agency Yes     Do you take prescription medications? Yes     Do you have prescription coverage? Yes     Coverage [employer]     Do you have any problems affording any of your prescribed medications? No     Is the patient taking medications as prescribed? yes     Who is going to help you get home at discharge? cab     How do you get to doctors appointments? public transportation;family or friend will provide     Are you on dialysis? Yes     Dialysis Name and Scheduled days [facility]     Do you take coumadin? Yes     Who monitors your labs? coumadin clinic     Discharge Plan A Home Health     Discharge Plan B Home     DME Needed Upon Discharge  none     Discharge Plan discussed with: Adult children;Patient     Transition of Care Barriers None

## 2023-06-29 NOTE — PLAN OF CARE
Pt clear for DC from CM standpoint. Discharging home with resumption of HH.     06/29/23 1314   Final Note   Assessment Type Final Discharge Note   Anticipated Discharge Disposition Home-Health   Post-Acute Status   Post-Acute Authorization Home Health   Home Health Status Set-up Complete/Auth obtained

## 2023-06-30 ENCOUNTER — PES CALL (OUTPATIENT)
Dept: ADMINISTRATIVE | Facility: CLINIC | Age: 83
End: 2023-06-30
Payer: MEDICARE

## 2023-07-18 ENCOUNTER — HOSPITAL ENCOUNTER (INPATIENT)
Facility: HOSPITAL | Age: 83
LOS: 2 days | Discharge: HOME HEALTH | DRG: 917 | End: 2023-07-21
Attending: EMERGENCY MEDICINE | Admitting: STUDENT IN AN ORGANIZED HEALTH CARE EDUCATION/TRAINING PROGRAM
Payer: MEDICARE

## 2023-07-18 DIAGNOSIS — T45.514A POISONING BY WARFARIN SODIUM, UNDETERMINED INTENT, INITIAL ENCOUNTER: Primary | ICD-10-CM

## 2023-07-18 DIAGNOSIS — R57.8 HEMORRHAGIC SHOCK: ICD-10-CM

## 2023-07-18 DIAGNOSIS — R00.1 BRADYCARDIA: ICD-10-CM

## 2023-07-18 DIAGNOSIS — Z99.2 DEPENDENCE ON RENAL DIALYSIS: ICD-10-CM

## 2023-07-18 DIAGNOSIS — R79.1 SUPRATHERAPEUTIC INR: ICD-10-CM

## 2023-07-18 DIAGNOSIS — N18.6 ESRD (END STAGE RENAL DISEASE): Chronic | ICD-10-CM

## 2023-07-18 DIAGNOSIS — Z79.01 ANTICOAGULATED ON COUMADIN: Chronic | ICD-10-CM

## 2023-07-18 DIAGNOSIS — I95.9 HYPOTENSION, UNSPECIFIED HYPOTENSION TYPE: ICD-10-CM

## 2023-07-18 LAB
ALBUMIN SERPL BCP-MCNC: 3.4 G/DL (ref 3.5–5.2)
ALP SERPL-CCNC: 48 U/L (ref 55–135)
ALT SERPL W/O P-5'-P-CCNC: 66 U/L (ref 10–44)
ANION GAP SERPL CALC-SCNC: 9 MMOL/L (ref 8–16)
AST SERPL-CCNC: 31 U/L (ref 10–40)
BASOPHILS # BLD AUTO: 0.03 K/UL (ref 0–0.2)
BASOPHILS NFR BLD: 0.6 % (ref 0–1.9)
BILIRUB SERPL-MCNC: 0.8 MG/DL (ref 0.1–1)
BNP SERPL-MCNC: 1147 PG/ML (ref 0–99)
BUN SERPL-MCNC: 35 MG/DL (ref 8–23)
CALCIUM SERPL-MCNC: 8.9 MG/DL (ref 8.7–10.5)
CHLORIDE SERPL-SCNC: 100 MMOL/L (ref 95–110)
CO2 SERPL-SCNC: 33 MMOL/L (ref 23–29)
CREAT SERPL-MCNC: 8.1 MG/DL (ref 0.5–1.4)
DIFFERENTIAL METHOD: ABNORMAL
EOSINOPHIL # BLD AUTO: 0.8 K/UL (ref 0–0.5)
EOSINOPHIL NFR BLD: 14 % (ref 0–8)
ERYTHROCYTE [DISTWIDTH] IN BLOOD BY AUTOMATED COUNT: 14.3 % (ref 11.5–14.5)
EST. GFR  (NO RACE VARIABLE): 4.5 ML/MIN/1.73 M^2
GLUCOSE SERPL-MCNC: 83 MG/DL (ref 70–110)
HCT VFR BLD AUTO: 31.6 % (ref 37–48.5)
HGB BLD-MCNC: 9.6 G/DL (ref 12–16)
IMM GRANULOCYTES # BLD AUTO: 0.01 K/UL (ref 0–0.04)
IMM GRANULOCYTES NFR BLD AUTO: 0.2 % (ref 0–0.5)
INR PPP: ABNORMAL (ref 0.8–1.2)
INR PPP: ABNORMAL (ref 0.8–1.2)
LYMPHOCYTES # BLD AUTO: 1.2 K/UL (ref 1–4.8)
LYMPHOCYTES NFR BLD: 21.8 % (ref 18–48)
MAGNESIUM SERPL-MCNC: 2 MG/DL (ref 1.6–2.6)
MCH RBC QN AUTO: 30.6 PG (ref 27–31)
MCHC RBC AUTO-ENTMCNC: 30.4 G/DL (ref 32–36)
MCV RBC AUTO: 101 FL (ref 82–98)
MONOCYTES # BLD AUTO: 1 K/UL (ref 0.3–1)
MONOCYTES NFR BLD: 17.9 % (ref 4–15)
NEUTROPHILS # BLD AUTO: 2.4 K/UL (ref 1.8–7.7)
NEUTROPHILS NFR BLD: 45.5 % (ref 38–73)
NRBC BLD-RTO: 0 /100 WBC
PLATELET # BLD AUTO: 260 K/UL (ref 150–450)
PMV BLD AUTO: 9.5 FL (ref 9.2–12.9)
POTASSIUM SERPL-SCNC: 4.5 MMOL/L (ref 3.5–5.1)
PROT SERPL-MCNC: 6.4 G/DL (ref 6–8.4)
PROTHROMBIN TIME: >100 SEC (ref 9–12.5)
PROTHROMBIN TIME: >100 SEC (ref 9–12.5)
RBC # BLD AUTO: 3.14 M/UL (ref 4–5.4)
SODIUM SERPL-SCNC: 142 MMOL/L (ref 136–145)
TROPONIN I SERPL HS-MCNC: 20.7 PG/ML (ref 0–14.9)
TROPONIN I SERPL HS-MCNC: 22.3 PG/ML (ref 0–14.9)
WBC # BLD AUTO: 5.36 K/UL (ref 3.9–12.7)

## 2023-07-18 PROCEDURE — 99285 EMERGENCY DEPT VISIT HI MDM: CPT | Mod: 25

## 2023-07-18 PROCEDURE — 84484 ASSAY OF TROPONIN QUANT: CPT | Mod: 91 | Performed by: EMERGENCY MEDICINE

## 2023-07-18 PROCEDURE — G0378 HOSPITAL OBSERVATION PER HR: HCPCS

## 2023-07-18 PROCEDURE — 85610 PROTHROMBIN TIME: CPT | Performed by: EMERGENCY MEDICINE

## 2023-07-18 PROCEDURE — 83880 ASSAY OF NATRIURETIC PEPTIDE: CPT | Performed by: EMERGENCY MEDICINE

## 2023-07-18 PROCEDURE — A4216 STERILE WATER/SALINE, 10 ML: HCPCS | Performed by: STUDENT IN AN ORGANIZED HEALTH CARE EDUCATION/TRAINING PROGRAM

## 2023-07-18 PROCEDURE — 86900 BLOOD TYPING SEROLOGIC ABO: CPT | Performed by: STUDENT IN AN ORGANIZED HEALTH CARE EDUCATION/TRAINING PROGRAM

## 2023-07-18 PROCEDURE — 93010 ELECTROCARDIOGRAM REPORT: CPT | Mod: ,,, | Performed by: SPECIALIST

## 2023-07-18 PROCEDURE — 85025 COMPLETE CBC W/AUTO DIFF WBC: CPT | Performed by: EMERGENCY MEDICINE

## 2023-07-18 PROCEDURE — 25000003 PHARM REV CODE 250: Performed by: EMERGENCY MEDICINE

## 2023-07-18 PROCEDURE — 80053 COMPREHEN METABOLIC PANEL: CPT | Performed by: EMERGENCY MEDICINE

## 2023-07-18 PROCEDURE — 96360 HYDRATION IV INFUSION INIT: CPT

## 2023-07-18 PROCEDURE — 93010 EKG 12-LEAD: ICD-10-PCS | Mod: ,,, | Performed by: SPECIALIST

## 2023-07-18 PROCEDURE — 25000003 PHARM REV CODE 250: Performed by: STUDENT IN AN ORGANIZED HEALTH CARE EDUCATION/TRAINING PROGRAM

## 2023-07-18 PROCEDURE — 83735 ASSAY OF MAGNESIUM: CPT | Performed by: EMERGENCY MEDICINE

## 2023-07-18 PROCEDURE — 94761 N-INVAS EAR/PLS OXIMETRY MLT: CPT

## 2023-07-18 PROCEDURE — 93005 ELECTROCARDIOGRAM TRACING: CPT | Performed by: SPECIALIST

## 2023-07-18 PROCEDURE — 85610 PROTHROMBIN TIME: CPT | Mod: 91 | Performed by: STUDENT IN AN ORGANIZED HEALTH CARE EDUCATION/TRAINING PROGRAM

## 2023-07-18 RX ORDER — DILTIAZEM HYDROCHLORIDE 120 MG/1
120 CAPSULE, COATED, EXTENDED RELEASE ORAL DAILY
Status: DISCONTINUED | OUTPATIENT
Start: 2023-07-19 | End: 2023-07-21 | Stop reason: HOSPADM

## 2023-07-18 RX ORDER — PHYTONADIONE 5 MG/1
5 TABLET ORAL ONCE
Status: COMPLETED | OUTPATIENT
Start: 2023-07-18 | End: 2023-07-18

## 2023-07-18 RX ORDER — SODIUM CHLORIDE 0.9 % (FLUSH) 0.9 %
10 SYRINGE (ML) INJECTION EVERY 12 HOURS
Status: DISCONTINUED | OUTPATIENT
Start: 2023-07-18 | End: 2023-07-21 | Stop reason: HOSPADM

## 2023-07-18 RX ORDER — METOPROLOL TARTRATE 25 MG/1
25 TABLET, FILM COATED ORAL 2 TIMES DAILY
Status: DISCONTINUED | OUTPATIENT
Start: 2023-07-18 | End: 2023-07-20

## 2023-07-18 RX ORDER — TALC
6 POWDER (GRAM) TOPICAL NIGHTLY PRN
Status: DISCONTINUED | OUTPATIENT
Start: 2023-07-18 | End: 2023-07-19

## 2023-07-18 RX ORDER — ACETAMINOPHEN 325 MG/1
650 TABLET ORAL EVERY 8 HOURS PRN
Status: DISCONTINUED | OUTPATIENT
Start: 2023-07-18 | End: 2023-07-21 | Stop reason: HOSPADM

## 2023-07-18 RX ORDER — DORZOLAMIDE HYDROCHLORIDE AND TIMOLOL MALEATE 20; 5 MG/ML; MG/ML
1 SOLUTION/ DROPS OPHTHALMIC 2 TIMES DAILY
COMMUNITY
Start: 2023-06-30

## 2023-07-18 RX ORDER — ATORVASTATIN CALCIUM 40 MG/1
40 TABLET, FILM COATED ORAL DAILY
Status: DISCONTINUED | OUTPATIENT
Start: 2023-07-18 | End: 2023-07-21 | Stop reason: HOSPADM

## 2023-07-18 RX ORDER — ONDANSETRON 2 MG/ML
4 INJECTION INTRAMUSCULAR; INTRAVENOUS EVERY 8 HOURS PRN
Status: DISCONTINUED | OUTPATIENT
Start: 2023-07-18 | End: 2023-07-21 | Stop reason: HOSPADM

## 2023-07-18 RX ORDER — APIXABAN 2.5 MG/1
2.5 TABLET, FILM COATED ORAL 2 TIMES DAILY
COMMUNITY
Start: 2023-07-18

## 2023-07-18 RX ORDER — LATANOPROST 50 UG/ML
1 SOLUTION/ DROPS OPHTHALMIC NIGHTLY
COMMUNITY
Start: 2023-06-30

## 2023-07-18 RX ADMIN — METOPROLOL TARTRATE 25 MG: 25 TABLET, FILM COATED ORAL at 10:07

## 2023-07-18 RX ADMIN — PHYTONADIONE 5 MG: 5 TABLET ORAL at 10:07

## 2023-07-18 RX ADMIN — ATORVASTATIN CALCIUM 40 MG: 40 TABLET, FILM COATED ORAL at 10:07

## 2023-07-18 RX ADMIN — SODIUM CHLORIDE 500 ML: 0.9 INJECTION, SOLUTION INTRAVENOUS at 07:07

## 2023-07-18 RX ADMIN — SODIUM CHLORIDE, PRESERVATIVE FREE 10 ML: 5 INJECTION INTRAVENOUS at 09:07

## 2023-07-18 NOTE — PHARMACY MED REC
"    Admission Medication History     The home medication history was taken by Karina Salinas.    You may go to "Admission" then "Reconcile Home Medications" tabs to review and/or act upon these items.     The home medication list has been updated by the Pharmacy department.   Please read ALL comments highlighted in yellow.   Please address this information as you see fit.    Feel free to contact us if you have any questions or require assistance.      Medications listed below were obtained from: Patient/family and Analytic software- [employer]  No current facility-administered medications on file prior to encounter.     Current Outpatient Medications on File Prior to Encounter   Medication Sig Dispense Refill    atorvastatin (LIPITOR) 40 MG tablet Take 40 mg by mouth once daily.      b complex vitamins tablet Take 1 tablet by mouth once daily.      calcium acetate,phosphat bind, (PHOSLO) 667 mg tablet Take 667 mg by mouth 2 (two) times daily with meals.      diltiaZEM (CARDIZEM CD) 120 MG Cp24 Take 1 capsule (120 mg total) by mouth 2 (two) times a day.      docusate sodium (COLACE) 100 MG capsule Take 100 mg by mouth once daily.      dorzolamide-timolol 2-0.5% (COSOPT) 22.3-6.8 mg/mL ophthalmic solution Place 1 drop into both eyes 2 (two) times daily.      latanoprost 0.005 % ophthalmic solution Place 1 drop into both eyes every evening.      metoprolol tartrate (LOPRESSOR) 25 MG tablet Take 1 tablet (25 mg total) by mouth 2 (two) times daily. 60 tablet 11    warfarin (COUMADIN) 3 MG tablet Take 3 mg by mouth every Mon, Wed, Fri.      warfarin (COUMADIN) 3 MG tablet Take 1.5 mg by mouth every Tuesday, Thursday, Saturday, Sunday.      ELIQUIS 2.5 mg Tab Take 2.5 mg by mouth 2 (two) times daily. NEW Rx - NOT YET STARTED           Karina Salinas  [id number]              .        "

## 2023-07-18 NOTE — ED PROVIDER NOTES
Encounter Date: 7/18/2023       History     Chief Complaint   Patient presents with    Weakness     Dialysis pt presents to ed complaining of generalized weakness since dialysis yesterday. Fistula was bleeding heavily according to family members. Pt has been complaining of severe weakness since. Unsure how much fluid was taken off.      83-year-old female presented emergency department with generalized malaise and weakness and bleeding from the dialysis fistula site on and off since yesterday.  Patient said she had dialysis yesterday and on her way home from dialysis started having bleeding from fistula site.  Patient said she lost significant amount of blood.  Patient continued to have episodes of bleeding today and has a dressing placed and currently bleeding stops.  As patient feeling extremely weeks contacted EMS.  EMS noted patient was hypotensive initially and gave her 200 cc of fluid on the way here and patient is feeling slightly better.  Patient denies fever or chills or nausea vomiting or chest pain or shortness of breath or abdominal pain.  Denies any focal weakness or numbness.  No active bleeding at this time.    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    Osteopenia of multiple sites 3/9/2018    Based upon bone density measurements. Patient also has chronic kidney disease.    Stroke 11/2016      Past Surgical History:   Procedure Laterality Date    ANGIOGRAM, CORONARY, WITH LEFT HEART CATHETERIZATION N/A 2/7/2022    Procedure: Angiogram, Coronary, with Left Heart Cath;  Surgeon: Gino Leal MD;  Location: [facility] CATH/EP LAB;  Service: Cardiology;  Laterality: N/A;    CARDIAC SURGERY      stents    EYE SURGERY      WRIST SURGERY       Family History   Problem Relation Age of Onset    Heart disease Mother     Cancer Father      Social History     Tobacco Use    Smoking status: Never    Smokeless tobacco: Never   Substance Use Topics    Alcohol use: No    Drug use: No     Review of Systems   Constitutional: Negative.    HENT: Negative.     Eyes: Negative.    Respiratory: Negative.     Cardiovascular: Negative.  Negative for chest pain.   Gastrointestinal: Negative.    Endocrine: Negative.    Genitourinary: Negative.    Musculoskeletal: Negative.    Skin: Negative.    Allergic/Immunologic: Negative.    Neurological: Negative.    Hematological: Negative.    Psychiatric/Behavioral: Negative.     All other systems reviewed and are negative.    Physical Exam     Initial Vitals [07/18/23 1555]   BP Pulse Resp Temp SpO2   (!) 122/50 63 18 98.5 °F (36.9 °C) 99 %      MAP       --         Physical Exam    Nursing note and vitals reviewed.  Constitutional: She appears well-developed and well-nourished.   HENT:   Head: Normocephalic and atraumatic.   Nose: Nose normal.   Mouth/Throat: Oropharynx is clear and moist.   Eyes: Conjunctivae and EOM are normal. Pupils are equal, round, and reactive to light.   Neck: Neck supple. No thyromegaly present. No tracheal deviation present. No JVD present.   Normal range of motion.  Cardiovascular:  Normal rate, regular rhythm, normal heart sounds and intact distal pulses.           No murmur heard.  Pulmonary/Chest: Breath sounds normal. No stridor. No respiratory distress. She has no wheezes. She has no rales.   Abdominal: Abdomen is soft. Bowel sounds are normal. She  exhibits no distension. There is no abdominal tenderness.   Musculoskeletal:         General: No edema. Normal range of motion.      Cervical back: Normal range of motion and neck supple.      Comments: Right upper extremity dialysis fistula with a dressing in place and no active bleeding noted.  Drug screen removed and no active bleeding noted.  Extremities neurovascularly intact     Neurological: She is alert and oriented to person, place, and time. She has normal strength. GCS score is 15. GCS eye subscore is 4. GCS verbal subscore is 5. GCS motor subscore is 6.   Skin: Skin is warm. Capillary refill takes less than 2 seconds.   Psychiatric: She has a normal mood and affect. Thought content normal.       ED Course   Procedures  Labs Reviewed   CBC W/ AUTO DIFFERENTIAL - Abnormal; Notable for the following components:       Result Value    RBC 3.14 (*)     Hemoglobin 9.6 (*)     Hematocrit 31.6 (*)      (*)     MCHC 30.4 (*)     Eos # 0.8 (*)     Mono % 17.9 (*)     Eosinophil % 14.0 (*)     All other components within normal limits   COMPREHENSIVE METABOLIC PANEL - Abnormal; Notable for the following components:    CO2 33 (*)     BUN 35 (*)     Creatinine 8.1 (*)     Albumin 3.4 (*)     Alkaline Phosphatase 48 (*)     ALT 66 (*)     eGFR 4.5 (*)     All other components within normal limits   TROPONIN I HIGH SENSITIVITY - Abnormal; Notable for the following components:    Troponin I High Sensitivity 22.3 (*)     All other components within normal limits   B-TYPE NATRIURETIC PEPTIDE - Abnormal; Notable for the following components:    BNP 1,147 (*)     All other components within normal limits   PROTIME-INR - Abnormal; Notable for the following components:    Prothrombin Time >100.0 (*)     All other components within normal limits    Narrative:     pt/inr critical result(s) repeated. Called and verbal readback   obtained from Romie Sue RN by LS1 07/18/2023 16:58   TROPONIN I HIGH SENSITIVITY - Abnormal;  Notable for the following components:    Troponin I High Sensitivity 20.7 (*)     All other components within normal limits   MAGNESIUM   PROTIME-INR     EKG Readings: (Independently Interpreted)   Initial Reading: No STEMI. Rhythm: Normal Sinus Rhythm. Ectopy: No Ectopy. Conduction: Normal.   Prolonged QT   ECG Results              EKG 12-lead (In process)  Result time 07/18/23 16:10:52      In process by Interface, Lab In [facility] (07/18/23 16:10:52)                   Narrative:    Test Reason : R06.02,    Vent. Rate : 074 BPM     Atrial Rate : 074 BPM     P-R Int : 180 ms          QRS Dur : 086 ms      QT Int : 518 ms       P-R-T Axes : 085 013 062 degrees     QTc Int : 574 ms    Normal sinus rhythm with sinus arrhythmia  Septal infarct (cited on or before 28-JUN-2023)  Prolonged QT  Abnormal ECG  When compared with ECG of 28-JUN-2023 08:10,  QT has lengthened    Referred By:             Confirmed By:                                   Imaging Results              X-Ray Chest AP Portable (Final result)  Result time 07/18/23 16:55:09      Final result by Mike Clayton MD (07/18/23 16:55:09)                   Narrative:    Reason: CHF    FINDINGS:    Portable chest with comparison chest x-ray June 28, 2023 show normal cardiomediastinal silhouette.  Lungs are clear. Pulmonary vasculature is normal. No acute osseous abnormality.    IMPRESSION:    No acute cardiopulmonary abnormality.    Electronically signed by:  Mike Clayton DO  7/18/2023 4:55 PM CDT Workstation: [id number]                                     Medications   sodium chloride 0.9% bolus 500 mL 500 mL (500 mLs Intravenous New Bag 7/18/23 1916)   sodium chloride 0.9% flush 10 mL (has no administration in time range)   melatonin tablet 6 mg (has no administration in time range)   acetaminophen tablet 650 mg (has no administration in time range)   ondansetron injection 4 mg (has no administration in time range)   atorvastatin tablet 40 mg (has no  administration in time range)   diltiaZEM 24 hr capsule 120 mg (has no administration in time range)   metoprolol tartrate (LOPRESSOR) tablet 25 mg (has no administration in time range)     Medical Decision Making:   Differential Diagnosis:   83-year-old female presented emergency department initially hypotensive likely secondary to recent blood loss and patient on dialysis.  IV fluids given and patient's blood pressure normalized.  Patient initially was having generalized weakness which improved with fluids.  Patient had labs reviewed and patient is hemoglobin is stable.  Patient currently is hemodynamically stable after fluids.  Given patient on Coumadin INR and PT checked and they are elevated and given this nephrologist Dr. Maynard consulted and given PT greater than 100 and INR not measurable and elevated she recommended patient be admitted for observation given recurrent episodes of bleeding from dialysis fistula since yesterday.  Given this Hospital Medicine consulted in an abundance of caution.  Patient currently is hemodynamically stable and labs reviewed and patient to be monitored closely in the hospital.  Independently Interpreted Test(s):   I have ordered and independently interpreted X-rays - see prior notes.  I have ordered and independently interpreted EKG Reading(s) - see prior notes  Clinical Tests:   Lab Tests: Ordered and Reviewed  Radiological Study: Ordered and Reviewed  Medical Tests: Ordered and Reviewed  Other:   I have discussed this case with another health care provider.       <> Summary of the Discussion: Hospitalist and nephrologist consulted about bleeding from dialysis fistula which now is controlled and no active bleeding.  Patient's INR however is elevated and patient initially was hypotensive but now hemodynamically stable and Hospital Medicine consulted for further management                        Clinical Impression:   Final diagnoses:  [T45.514A] Poisoning by warfarin sodium,  undetermined intent, initial encounter (Primary)  [I95.9] Hypotension, unspecified hypotension type  [R57.8] Hemorrhagic shock  [Z79.01] Anticoagulated on Coumadin (Chronic)        ED Disposition Condition    Observation                 Tiny Zavala MD  07/18/23 1925

## 2023-07-19 LAB
ABO + RH BLD: NORMAL
ANION GAP SERPL CALC-SCNC: 8 MMOL/L (ref 8–16)
BLD GP AB SCN CELLS X3 SERPL QL: NORMAL
BUN SERPL-MCNC: 42 MG/DL (ref 8–23)
CALCIUM SERPL-MCNC: 8.4 MG/DL (ref 8.7–10.5)
CHLORIDE SERPL-SCNC: 105 MMOL/L (ref 95–110)
CO2 SERPL-SCNC: 29 MMOL/L (ref 23–29)
CREAT SERPL-MCNC: 8.9 MG/DL (ref 0.5–1.4)
ERYTHROCYTE [DISTWIDTH] IN BLOOD BY AUTOMATED COUNT: 14.2 % (ref 11.5–14.5)
EST. GFR  (NO RACE VARIABLE): 4.1 ML/MIN/1.73 M^2
FERRITIN SERPL-MCNC: 1029 NG/ML (ref 20–300)
GLUCOSE SERPL-MCNC: 91 MG/DL (ref 70–110)
HCT VFR BLD AUTO: 29.1 % (ref 37–48.5)
HGB BLD-MCNC: 8.8 G/DL (ref 12–16)
INR PPP: 9.7 (ref 0.8–1.2)
IRON SERPL-MCNC: 60 UG/DL (ref 30–160)
MCH RBC QN AUTO: 30.6 PG (ref 27–31)
MCHC RBC AUTO-ENTMCNC: 30.2 G/DL (ref 32–36)
MCV RBC AUTO: 101 FL (ref 82–98)
PLATELET # BLD AUTO: 264 K/UL (ref 150–450)
PMV BLD AUTO: 9.6 FL (ref 9.2–12.9)
POTASSIUM SERPL-SCNC: 4.9 MMOL/L (ref 3.5–5.1)
PROTHROMBIN TIME: 93.3 SEC (ref 9–12.5)
RBC # BLD AUTO: 2.88 M/UL (ref 4–5.4)
SATURATED IRON: 36 % (ref 20–50)
SODIUM SERPL-SCNC: 142 MMOL/L (ref 136–145)
SPECIMEN OUTDATE: NORMAL
TOTAL IRON BINDING CAPACITY: 169 UG/DL (ref 250–450)
TRANSFERRIN SERPL-MCNC: 121 MG/DL (ref 200–375)
WBC # BLD AUTO: 5.7 K/UL (ref 3.9–12.7)

## 2023-07-19 PROCEDURE — 85610 PROTHROMBIN TIME: CPT | Performed by: STUDENT IN AN ORGANIZED HEALTH CARE EDUCATION/TRAINING PROGRAM

## 2023-07-19 PROCEDURE — 99900035 HC TECH TIME PER 15 MIN (STAT)

## 2023-07-19 PROCEDURE — 36415 COLL VENOUS BLD VENIPUNCTURE: CPT | Performed by: STUDENT IN AN ORGANIZED HEALTH CARE EDUCATION/TRAINING PROGRAM

## 2023-07-19 PROCEDURE — 84466 ASSAY OF TRANSFERRIN: CPT | Performed by: STUDENT IN AN ORGANIZED HEALTH CARE EDUCATION/TRAINING PROGRAM

## 2023-07-19 PROCEDURE — 25000003 PHARM REV CODE 250: Performed by: STUDENT IN AN ORGANIZED HEALTH CARE EDUCATION/TRAINING PROGRAM

## 2023-07-19 PROCEDURE — 94761 N-INVAS EAR/PLS OXIMETRY MLT: CPT

## 2023-07-19 PROCEDURE — 90935 HEMODIALYSIS ONE EVALUATION: CPT

## 2023-07-19 PROCEDURE — 12000002 HC ACUTE/MED SURGE SEMI-PRIVATE ROOM

## 2023-07-19 PROCEDURE — A4216 STERILE WATER/SALINE, 10 ML: HCPCS | Performed by: STUDENT IN AN ORGANIZED HEALTH CARE EDUCATION/TRAINING PROGRAM

## 2023-07-19 PROCEDURE — 93010 EKG 12-LEAD: ICD-10-PCS | Mod: ,,, | Performed by: INTERNAL MEDICINE

## 2023-07-19 PROCEDURE — 93010 ELECTROCARDIOGRAM REPORT: CPT | Mod: ,,, | Performed by: INTERNAL MEDICINE

## 2023-07-19 PROCEDURE — 63600175 PHARM REV CODE 636 W HCPCS: Mod: JZ | Performed by: INTERNAL MEDICINE

## 2023-07-19 PROCEDURE — 80048 BASIC METABOLIC PNL TOTAL CA: CPT | Performed by: STUDENT IN AN ORGANIZED HEALTH CARE EDUCATION/TRAINING PROGRAM

## 2023-07-19 PROCEDURE — 82728 ASSAY OF FERRITIN: CPT | Performed by: STUDENT IN AN ORGANIZED HEALTH CARE EDUCATION/TRAINING PROGRAM

## 2023-07-19 PROCEDURE — 93005 ELECTROCARDIOGRAM TRACING: CPT | Performed by: INTERNAL MEDICINE

## 2023-07-19 PROCEDURE — 85027 COMPLETE CBC AUTOMATED: CPT | Performed by: STUDENT IN AN ORGANIZED HEALTH CARE EDUCATION/TRAINING PROGRAM

## 2023-07-19 RX ORDER — CALCIUM ACETATE 667 MG/1
667 CAPSULE ORAL DAILY
Status: DISCONTINUED | OUTPATIENT
Start: 2023-07-19 | End: 2023-07-21 | Stop reason: HOSPADM

## 2023-07-19 RX ADMIN — ERYTHROPOIETIN 5200 UNITS: 10000 INJECTION, SOLUTION INTRAVENOUS; SUBCUTANEOUS at 04:07

## 2023-07-19 RX ADMIN — ATORVASTATIN CALCIUM 40 MG: 40 TABLET, FILM COATED ORAL at 09:07

## 2023-07-19 RX ADMIN — SODIUM CHLORIDE, PRESERVATIVE FREE 10 ML: 5 INJECTION INTRAVENOUS at 09:07

## 2023-07-19 RX ADMIN — METOPROLOL TARTRATE 25 MG: 25 TABLET, FILM COATED ORAL at 09:07

## 2023-07-19 RX ADMIN — DILTIAZEM HYDROCHLORIDE 120 MG: 120 CAPSULE, COATED, EXTENDED RELEASE ORAL at 08:07

## 2023-07-19 RX ADMIN — METOPROLOL TARTRATE 25 MG: 25 TABLET, FILM COATED ORAL at 08:07

## 2023-07-19 NOTE — CONSULTS
INPATIENT NEPHROLOGY CONSULT   [facility]    Patient Name: Tyra Isaac  Date: 07/19/2023    Reason for consultation: ESRD    Chief Complaint:   Chief Complaint   Patient presents with    Weakness     Dialysis pt presents to ed complaining of generalized weakness since dialysis yesterday. Fistula was bleeding heavily according to family members. Pt has been complaining of severe weakness since. Unsure how much fluid was taken off.        History of Present Illness:   Patient is a 83-year-old female with history of ESRD on HD Monday Wednesday Friday and AFib on anticoagulation who presents with generalized weakness and bleeding from her right upper extremity fistula site.  Patient had routine HD yesterday and developed bleeding for fissure slight delay home.  Per patient, it was a lot of blood.  The bleeding later stopped.  Patient is on warfarin for AFib.  Patient had another episode of bleeding today.  EMS was called patient's house as she felt generally weak.  Patient was noted to be hypotensive was given a small fluid bolus with improvement in blood pressure.  In ED, there is no further evidence of bleeding.  Hemoglobin 9.6 which is near her baseline.  PT greater than 100 and INR unable to calculate. Consulted for dialysis.     Interval History:  7/19- she says she thinks she was getting coumadin twice per day for the last week- she noticed it was different- she says the nurse who filled her weekly box said she was following orders- will need assistance from VICENTE/KATY to contact nurse and find out what orders she was following    Plan of Care:    Assessment:  ESRD on HD MWF  HTN  SHPT  Anemia of CKD  Bleeding due to supratherapeutic INR- hx of AF on AC    Plan:    - ordered HD MWF  - UF 2L  - resume binder with meals  - no acute transfusion needs- ordered BRAYAN with HD  - will need to clarify outpd coumadin orders- INR coming down- coumadin remains on hold- patient denies dietary  changes    Thank you for allowing us to participate in this patient's care. We will continue to follow.    Vital Signs:  Temp Readings from Last 3 Encounters:   07/19/23 98.3 °F (36.8 °C) (Oral)   06/29/23 98.2 °F (36.8 °C) (Oral)   04/01/23 97.3 °F (36.3 °C) (Oral)       Pulse Readings from Last 3 Encounters:   07/19/23 (!) 56   06/29/23 82   04/01/23 (!) 51       BP Readings from Last 3 Encounters:   07/19/23 (!) 114/51   06/29/23 (!) 164/73   04/01/23 (!) 119/50       Weight:  Wt Readings from Last 3 Encounters:   07/18/23 51.5 kg (113 lb 9.6 oz)   06/28/23 49.9 kg (110 lb)   03/31/23 51.3 kg (113 lb)       Past Medical & Surgical History:  Past Medical History:   Diagnosis Date    A-fib     Anxiety     Depression     Disorder of kidney and ureter     Encephalopathy acute 1/1/2018    End stage kidney disease 6/17/2017    Gout     Hyperlipidemia     Hypertension     Moderate episode of recurrent major depressive disorder 1/17/2018    Nephropathy hypertensive, 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    Osteopenia of multiple sites 3/9/2018    Based upon bone density measurements. Patient also has chronic kidney disease.    Stroke 11/2016       Past Surgical History:   Procedure Laterality Date    ANGIOGRAM, CORONARY, WITH LEFT HEART CATHETERIZATION N/A 2/7/2022    Procedure: Angiogram, Coronary, with Left Heart Cath;  Surgeon: Gino Leal MD;  Location: [facility] CATH/EP LAB;  Service: Cardiology;  Laterality: N/A;    CARDIAC SURGERY      stents    EYE SURGERY      WRIST SURGERY         Past Social History:  Social History     Socioeconomic History    Marital status:      Spouse name: Aria Isaac    Number of children: 3   Occupational History    Occupation: Not working   Tobacco Use    Smoking status: Never    Smokeless tobacco: Never   Substance and Sexual Activity     Alcohol use: No    Drug use: No    Sexual activity: Not Currently     Social Determinants of Health     Transportation Needs: Unmet Transportation Needs    Lack of Transportation (Medical): Yes    Lack of Transportation (Non-Medical): No       Medications:  Scheduled Meds:   atorvastatin  40 mg Oral Daily    diltiaZEM  120 mg Oral Daily    metoprolol tartrate  25 mg Oral BID    sodium chloride 0.9%  10 mL Intravenous Q12H     Continuous Infusions:  PRN Meds:.acetaminophen, melatonin, ondansetron  No current facility-administered medications on file prior to encounter.     Current Outpatient Medications on File Prior to Encounter   Medication Sig Dispense Refill    atorvastatin (LIPITOR) 40 MG tablet Take 40 mg by mouth once daily.      b complex vitamins tablet Take 1 tablet by mouth once daily.      calcium acetate,phosphat bind, (PHOSLO) 667 mg tablet Take 667 mg by mouth 2 (two) times daily with meals.      diltiaZEM (CARDIZEM CD) 120 MG Cp24 Take 1 capsule (120 mg total) by mouth 2 (two) times a day.      docusate sodium (COLACE) 100 MG capsule Take 100 mg by mouth once daily.      dorzolamide-timolol 2-0.5% (COSOPT) 22.3-6.8 mg/mL ophthalmic solution Place 1 drop into both eyes 2 (two) times daily.      latanoprost 0.005 % ophthalmic solution Place 1 drop into both eyes every evening.      metoprolol tartrate (LOPRESSOR) 25 MG tablet Take 1 tablet (25 mg total) by mouth 2 (two) times daily. 60 tablet 11    warfarin (COUMADIN) 3 MG tablet Take 3 mg by mouth every Mon, Wed, Fri.      warfarin (COUMADIN) 3 MG tablet Take 1.5 mg by mouth every Tuesday, Thursday, Saturday, Sunday.      ELIQUIS 2.5 mg Tab Take 2.5 mg by mouth 2 (two) times daily. NEW Rx - NOT YET STARTED         Allergies:  Cyclobenzaprine, Fish containing products, Peanut, and Tramadol    Past Family History:  Reviewed; refer to Hospitalist Admission Note    Review of Systems:  Review of Systems - All 14 systems reviewed and negative, except as  noted in HPI    Physical Exam:  General Appearance:    NAD, AAO x 3, cooperative, appears stated age   Head:    Normocephalic, atraumatic   Eyes:    PER, EOMI, and conjunctiva/sclera clear bilaterally       Mouth:   Moist mucus membranes, no thrush or oral lesions,       normal dentition   Back:     No CVA tenderness   Lungs:     Clear to auscultation bilaterally, no wheezes, crackles,           rales or rhonchi, symmetric air movement, respirations unlabored   Chest wall:    No tenderness or deformity   Heart:    Regular rate and rhythm, S1 and S2 normal, no murmur, rub   or gallop   Abdomen:     Soft, non-tender, non-distended, bowel sounds active all four   quadrants, no RT or guarding, no masses, no organomegaly   Extremities:   Warm and well perfused, distal pulses are intact, no             cyanosis or peripheral edema   MSK:   No joint or muscle swelling, tenderness or deformity   Skin:   Skin color, texture, turgor normal, no rashes or lesions   Neurologic/Psychiatric:   CNII-XII intact, normal strength and sensation       throughout, no asterixis; normal affect, memory, judgement     and insight      Results:  Lab Results   Component Value Date     07/19/2023    K 4.9 07/19/2023     07/19/2023    CO2 29 07/19/2023    BUN 42 (H) 07/19/2023    CREATININE 8.9 (H) 07/19/2023    CALCIUM 8.4 (L) 07/19/2023    ANIONGAP 8 07/19/2023    ESTGFRAFRICA 4.7 (A) 02/08/2022    EGFRNONAA 4.0 (A) 02/08/2022       Lab Results   Component Value Date    CALCIUM 8.4 (L) 07/19/2023    PHOS 3.2 06/29/2023       Recent Labs   Lab 07/19/23  0607   WBC 5.70   RBC 2.88*   HGB 8.8*   HCT 29.1*      *   MCH 30.6   MCHC 30.2*       I have personally reviewed pertinent radiological imaging and reports.    I have spent > 70 minutes providing care for this patient for the above diagnoses. These services have included chart/data/imaging review, evaluation, exam, formulation of plan, , note preparation,  and discussions with staff involved in this patient's care.    Rose Maynard MD MPH  [facility] [address] [address]  [phone] (p)  [phone] (f)

## 2023-07-19 NOTE — PROGRESS NOTES
[facility] Medicine  Progress Note    Patient Name: Tyra Isaac  MRN: [id number]  Patient Class: OP- Observation   Admission Date: 7/18/2023  Length of Stay: 0 days  Attending Physician: Indu Del Valle MD  Primary Care Provider: Kalpesh Goel MD        Subjective:     Principal Problem:Supratherapeutic INR        HPI:  Patient is a 83-year-old female with history of ESRD on HD Monday Wednesday Friday and AFib on anticoagulation who presents with generalized weakness and bleeding from her right upper extremity fistula site.  Patient had routine HD yesterday and developed bleeding for fissure slight delay home.  Per patient, it was a lot of blood.  The bleeding later stopped.  Patient is on warfarin for AFib.  Patient had another episode of bleeding today.  EMS was called patient's house as she felt generally weak.  Patient was noted to be hypotensive was given a small fluid bolus with improvement in blood pressure.  In ED, there is no further evidence of bleeding.  Hemoglobin 9.6 which is near her baseline.  PT greater than 100 and INR unable to calculate.  Repeat PT INR pending.      Overview/Hospital Course:  No notes on file    Interval History:  Patient seen and examined.  No acute events since admission.  Reports that she became mildly short of breath after ambulating to the bathroom, but shortness of breath now resolved.  Discussed plan of care with patient and answered all questions.  Nursing at bedside.    Review of Systems   Respiratory:  Positive for shortness of breath.    Neurological:  Positive for weakness (Generalized).   All other systems reviewed and are negative.  Objective:     Vital Signs (Most Recent):  Temp: 98.8 °F (37.1 °C) (07/19/23 1140)  Pulse: 60 (07/19/23 1140)  Resp: 19 (07/19/23 1140)  BP: 134/63 (07/19/23 1140)  SpO2: 97 % (07/19/23 1140) Vital Signs (24h Range):  Temp:  [97.6 °F (36.4 °C)-98.8 °F (37.1 °C)] 98.8 °F (37.1 °C)  Pulse:  [54-74] 60  Resp:   [17-19] 19  SpO2:  [97 %-99 %] 97 %  BP: (114-137)/(50-64) 134/63     Weight: 51.5 kg (113 lb 9.6 oz)  Body mass index is 18.9 kg/m².    Intake/Output Summary (Last 24 hours) at 7/19/2023 1226  Last data filed at 7/19/2023 1047  Gross per 24 hour   Intake 820 ml   Output 102 ml   Net 718 ml         Physical Exam  Vitals and nursing note reviewed.   Constitutional:       General: She is not in acute distress.     Appearance: She is not ill-appearing, toxic-appearing or diaphoretic.   Cardiovascular:      Rate and Rhythm: Normal rate and regular rhythm.      Pulses: Normal pulses.      Heart sounds: Normal heart sounds.      Comments: Right upper arm AV fistula positive bruit, positive thrill  Pulmonary:      Effort: Pulmonary effort is normal. No respiratory distress.      Breath sounds: Normal breath sounds. No wheezing, rhonchi or rales.   Abdominal:      General: Bowel sounds are normal. There is no distension.      Palpations: Abdomen is soft.      Tenderness: There is no abdominal tenderness. There is no guarding.   Skin:     General: Skin is warm and dry.      Coloration: Skin is not jaundiced or pale.      Comments: No signs of bleeding at AV fistula site.   Neurological:      Mental Status: She is alert and oriented to person, place, and time. Mental status is at baseline.           Significant Labs: All pertinent labs within the past 24 hours have been reviewed.  CBC:   Recent Labs   Lab 07/18/23  1605 07/19/23  0607   WBC 5.36 5.70   HGB 9.6* 8.8*   HCT 31.6* 29.1*    264     CMP:   Recent Labs   Lab 07/18/23  1605 07/19/23  0607    142   K 4.5 4.9    105   CO2 33* 29   GLU 83 91   BUN 35* 42*   CREATININE 8.1* 8.9*   CALCIUM 8.9 8.4*   PROT 6.4  --    ALBUMIN 3.4*  --    BILITOT 0.8  --    ALKPHOS 48*  --    AST 31  --    ALT 66*  --    ANIONGAP 9 8     Coagulation:   Recent Labs   Lab 07/19/23  0607   INR 9.7*       Significant Imaging: I have reviewed all pertinent imaging  results/findings within the past 24 hours.    X-Ray Chest AP Portable [[id number]] Collected: 07/18/23 1601   Order Status: Completed Updated: 07/18/23 1657   Narrative:     Reason: CHF     FINDINGS:     Portable chest with comparison chest x-ray June 28, 2023 show normal cardiomediastinal silhouette.   Lungs are clear. Pulmonary vasculature is normal. No acute osseous abnormality.     IMPRESSION:     No acute cardiopulmonary abnormality.     Electronically signed by:  Mike Clayton DO  7/18/2023 4:55 PM CDT Workstation: [id number]       Assessment/Plan:      * Supratherapeutic INR  PT greater than 100 and INR unable to calculate.  Repeat PT INR pending.  Received dose of oral vitamin K.   No further bleeding from fistula site  Holding warfarin  CBC and INR daily  Monitor for bleeding    Atrial fibrillation  Patient with Paroxysmal (<7 days) atrial fibrillation which is controlled currently with Beta Blocker and Calcium Channel Blocker. Patient is currently in sinus rhythm.[id number] Score: 4. . Anticoagulation Indicated, but on hold currently due to Supra therapeutic INR.     Review of patient chart shows recent encounter for change of anticoagulation from warfarin to apixaban.  Was ordered 7/17/2023.  Patient has not started this medication yet.  Once INR within normal range, will start apixaban per patient's cardiologist and nephrologist recommendations.    Anemia of chronic disease  Hemoglobin stable   CBC daily  Check iron panel and type and screen      ESRD on HD via are right upper extremity AVF MWF  HD Monday Wednesday Friday   Nephrology consulted for routine HD        VTE Risk Mitigation (From admission, onward)           Ordered     IP VTE HIGH RISK PATIENT  Once         07/18/23 1915     Place sequential compression device  Until discontinued         07/18/23 1915                    Discharge Planning   ILAN: 7/20/2023     Code Status: Full Code   Is the patient medically ready for discharge?:     Reason  for patient still in hospital (select all that apply): Patient trending condition, Treatment and Consult recommendations                     Maryjane Hua NP  Department of Hospital Medicine   [facility]

## 2023-07-19 NOTE — NURSING
Pt in dialysis- Monitor room called stating heart rate dropping to low 40's. Notified Jillian in dialysis. Notified NP Refugio of pt status.

## 2023-07-19 NOTE — SUBJECTIVE & OBJECTIVE
Interval History:  Patient seen and examined.  No acute events since admission.  Reports that she became mildly short of breath after ambulating to the bathroom, but shortness of breath now resolved.  Discussed plan of care with patient and answered all questions.  Nursing at bedside.    Review of Systems   Respiratory:  Positive for shortness of breath.    Neurological:  Positive for weakness (Generalized).   All other systems reviewed and are negative.  Objective:     Vital Signs (Most Recent):  Temp: 98.8 °F (37.1 °C) (07/19/23 1140)  Pulse: 60 (07/19/23 1140)  Resp: 19 (07/19/23 1140)  BP: 134/63 (07/19/23 1140)  SpO2: 97 % (07/19/23 1140) Vital Signs (24h Range):  Temp:  [97.6 °F (36.4 °C)-98.8 °F (37.1 °C)] 98.8 °F (37.1 °C)  Pulse:  [54-74] 60  Resp:  [17-19] 19  SpO2:  [97 %-99 %] 97 %  BP: (114-137)/(50-64) 134/63     Weight: 51.5 kg (113 lb 9.6 oz)  Body mass index is 18.9 kg/m².    Intake/Output Summary (Last 24 hours) at 7/19/2023 1226  Last data filed at 7/19/2023 1047  Gross per 24 hour   Intake 820 ml   Output 102 ml   Net 718 ml         Physical Exam  Vitals and nursing note reviewed.   Constitutional:       General: She is not in acute distress.     Appearance: She is not ill-appearing, toxic-appearing or diaphoretic.   Cardiovascular:      Rate and Rhythm: Normal rate and regular rhythm.      Pulses: Normal pulses.      Heart sounds: Normal heart sounds.      Comments: Right upper arm AV fistula positive bruit, positive thrill  Pulmonary:      Effort: Pulmonary effort is normal. No respiratory distress.      Breath sounds: Normal breath sounds. No wheezing, rhonchi or rales.   Abdominal:      General: Bowel sounds are normal. There is no distension.      Palpations: Abdomen is soft.      Tenderness: There is no abdominal tenderness. There is no guarding.   Skin:     General: Skin is warm and dry.      Coloration: Skin is not jaundiced or pale.      Comments: No signs of bleeding at AV fistula site.    Neurological:      Mental Status: She is alert and oriented to person, place, and time. Mental status is at baseline.           Significant Labs: All pertinent labs within the past 24 hours have been reviewed.  CBC:   Recent Labs   Lab 07/18/23  1605 07/19/23  0607   WBC 5.36 5.70   HGB 9.6* 8.8*   HCT 31.6* 29.1*    264     CMP:   Recent Labs   Lab 07/18/23  1605 07/19/23  0607    142   K 4.5 4.9    105   CO2 33* 29   GLU 83 91   BUN 35* 42*   CREATININE 8.1* 8.9*   CALCIUM 8.9 8.4*   PROT 6.4  --    ALBUMIN 3.4*  --    BILITOT 0.8  --    ALKPHOS 48*  --    AST 31  --    ALT 66*  --    ANIONGAP 9 8     Coagulation:   Recent Labs   Lab 07/19/23  0607   INR 9.7*       Significant Imaging: I have reviewed all pertinent imaging results/findings within the past 24 hours.    X-Ray Chest AP Portable [[id number]] Collected: 07/18/23 1601   Order Status: Completed Updated: 07/18/23 1657   Narrative:     Reason: CHF     FINDINGS:     Portable chest with comparison chest x-ray June 28, 2023 show normal cardiomediastinal silhouette.   Lungs are clear. Pulmonary vasculature is normal. No acute osseous abnormality.     IMPRESSION:     No acute cardiopulmonary abnormality.     Electronically signed by:  Mike Clayton DO  7/18/2023 4:55 PM CDT Workstation: [id number]

## 2023-07-19 NOTE — H&P
[facility] - Emergency Dept  Hospital Medicine  History & Physical    Patient Name: Tyra Isaac  MRN: [id number]  Patient Class: OP- Observation  Admission Date: 7/18/2023  Attending Physician: Imtiaz Fenton MD   Primary Care Provider: Kalpesh Goel MD         Patient information was obtained from patient, past medical records and ER records.     Subjective:     Principal Problem:Supratherapeutic INR    Chief Complaint:   Chief Complaint   Patient presents with    Weakness     Dialysis pt presents to ed complaining of generalized weakness since dialysis yesterday. Fistula was bleeding heavily according to family members. Pt has been complaining of severe weakness since. Unsure how much fluid was taken off.         HPI: Patient is a 83-year-old female with history of ESRD on HD Monday Wednesday Friday and AFib on anticoagulation who presents with generalized weakness and bleeding from her right upper extremity fistula site.  Patient had routine HD yesterday and developed bleeding for fissure slight delay home.  Per patient, it was a lot of blood.  The bleeding later stopped.  Patient is on warfarin for AFib.  Patient had another episode of bleeding today.  EMS was called patient's house as she felt generally weak.  Patient was noted to be hypotensive was given a small fluid bolus with improvement in blood pressure.  In ED, there is no further evidence of bleeding.  Hemoglobin 9.6 which is near her baseline.  PT greater than 100 and INR unable to calculate.  Repeat PT INR pending.      Past Medical History:   Diagnosis Date    A-fib     Anxiety     Depression     Disorder of kidney and ureter     Encephalopathy acute 1/1/2018    End stage kidney disease 6/17/2017    Gout     Hyperlipidemia     Hypertension     Moderate episode of recurrent major depressive disorder 1/17/2018    Nephropathy hypertensive, stage 5 chronic kidney disease or end stage renal disease 6/17/2017    Obstructive  pattern present on pulmonary function testing 7/28/2021    Shows moderate obstruction.    Osteopenia of multiple sites 3/9/2018    Based upon bone density measurements. Patient also has chronic kidney disease.    Stroke 11/2016       Past Surgical History:   Procedure Laterality Date    ANGIOGRAM, CORONARY, WITH LEFT HEART CATHETERIZATION N/A 2/7/2022    Procedure: Angiogram, Coronary, with Left Heart Cath;  Surgeon: Gino Leal MD;  Location: [facility] CATH/EP LAB;  Service: Cardiology;  Laterality: N/A;    CARDIAC SURGERY      stents    EYE SURGERY      WRIST SURGERY         Review of patient's allergies indicates:   Allergen Reactions    Cyclobenzaprine     Fish containing products Hives    Peanut Other (See Comments)    Tramadol Itching       No current facility-administered medications on file prior to encounter.     Current Outpatient Medications on File Prior to Encounter   Medication Sig    atorvastatin (LIPITOR) 40 MG tablet Take 40 mg by mouth once daily.    b complex vitamins tablet Take 1 tablet by mouth once daily.    calcium acetate,phosphat bind, (PHOSLO) 667 mg tablet Take 667 mg by mouth 2 (two) times daily with meals.    diltiaZEM (CARDIZEM CD) 120 MG Cp24 Take 1 capsule (120 mg total) by mouth 2 (two) times a day.    docusate sodium (COLACE) 100 MG capsule Take 100 mg by mouth once daily.    dorzolamide-timolol 2-0.5% (COSOPT) 22.3-6.8 mg/mL ophthalmic solution Place 1 drop into both eyes 2 (two) times daily.    latanoprost 0.005 % ophthalmic solution Place 1 drop into both eyes every evening.    metoprolol tartrate (LOPRESSOR) 25 MG tablet Take 1 tablet (25 mg total) by mouth 2 (two) times daily.    warfarin (COUMADIN) 3 MG tablet Take 3 mg by mouth every Mon, Wed, Fri.    warfarin (COUMADIN) 3 MG tablet Take 1.5 mg by mouth every Tuesday, Thursday, Saturday, Sunday.    ELIQUIS 2.5 mg Tab Take 2.5 mg by mouth 2 (two) times daily. NEW Rx - NOT YET STARTED     Family History        Problem Relation (Age of Onset)    Cancer Father    Heart disease Mother          Tobacco Use    Smoking status: Never    Smokeless tobacco: Never   Substance and Sexual Activity    Alcohol use: No    Drug use: No    Sexual activity: Not Currently     Review of Systems   Constitutional:  Positive for fatigue. Negative for chills and fever.   HENT: Negative.     Eyes: Negative.    Respiratory: Negative.     Cardiovascular: Negative.    Gastrointestinal: Negative.    Endocrine: Negative.    Genitourinary: Negative.    Musculoskeletal: Negative.    Skin: Negative.    Neurological:  Positive for weakness. Negative for dizziness, facial asymmetry, light-headedness and headaches.   Hematological:  Negative for adenopathy. Bruises/bleeds easily.   Psychiatric/Behavioral: Negative.     Objective:     Vital Signs (Most Recent):  Temp: 98.5 °F (36.9 °C) (07/18/23 1555)  Pulse: (!) 54 (07/18/23 1824)  Resp: 18 (07/18/23 1824)  BP: 136/63 (07/18/23 1824)  SpO2: 97 % (07/18/23 1824) Vital Signs (24h Range):  Temp:  [98.5 °F (36.9 °C)] 98.5 °F (36.9 °C)  Pulse:  [54-66] 54  Resp:  [18] 18  SpO2:  [97 %-99 %] 97 %  BP: (122-136)/(50-63) 136/63     Weight: 49.9 kg (110 lb)  Body mass index is 18.3 kg/m².     Physical Exam  Vitals and nursing note reviewed.   Constitutional:       General: She is not in acute distress.     Appearance: Normal appearance.   HENT:      Head: Normocephalic and atraumatic.      Nose: Nose normal.   Eyes:      Extraocular Movements: Extraocular movements intact.      Conjunctiva/sclera: Conjunctivae normal.      Pupils: Pupils are equal, round, and reactive to light.   Cardiovascular:      Rate and Rhythm: Normal rate and regular rhythm.   Pulmonary:      Effort: Pulmonary effort is normal. No respiratory distress.      Breath sounds: Normal breath sounds. No stridor. No wheezing or rales.   Abdominal:      General: Bowel sounds are normal. There is no distension.      Palpations: Abdomen is  soft.      Tenderness: There is no abdominal tenderness.   Musculoskeletal:         General: No swelling or tenderness. Normal range of motion.      Cervical back: Normal range of motion and neck supple.      Right lower leg: No edema.      Left lower leg: No edema.   Skin:     General: Skin is warm and dry.      Comments: Fistula right upper extremity, nonbleeding   Neurological:      General: No focal deficit present.      Mental Status: She is alert and oriented to person, place, and time.   Psychiatric:         Mood and Affect: Mood normal.         Behavior: Behavior normal.         Thought Content: Thought content normal.         Judgment: Judgment normal.            CRANIAL NERVES     CN III, IV, VI   Pupils are equal, round, and reactive to light.     Significant Labs: All pertinent labs within the past 24 hours have been reviewed.  Recent Lab Results         07/18/23  1744   07/18/23  1605        Albumin   3.4       Alkaline Phosphatase   48       ALT   66       Anion Gap   9       AST   31       Baso #   0.03       Basophil %   0.6       BILIRUBIN TOTAL   0.8  Comment: For infants and newborns, interpretation of results should be based  on gestational age, weight and in agreement with clinical  observations.    Premature Infant recommended reference ranges:  Up to 24 hours.............<8.0 mg/dL  Up to 48 hours............<12.0 mg/dL  3-5 days..................<15.0 mg/dL  6-29 days.................<15.0 mg/dL         BNP   1,147  Comment: Values of less than 100 pg/ml are consistent with non-CHF populations.       BUN   35       Calcium   8.9       Chloride   100       CO2   33       Creatinine   8.1       Differential Method   Automated       eGFR   4.5       Eos #   0.8       Eosinophil %   14.0       Glucose   83       Gran # (ANC)   2.4       Gran %   45.5       Hematocrit   31.6       Hemoglobin   9.6       Immature Grans (Abs)   0.01  Comment: Mild elevation in immature granulocytes is non specific  and   can be seen in a variety of conditions including stress response,   acute inflammation, trauma and pregnancy. Correlation with other   laboratory and clinical findings is essential.         Immature Granulocytes   0.2       INR   SEE COMMENT  Comment: Coumadin Therapy:  2.0 - 3.0 for INR for all indicators except mechanical heart valves  and antiphospholipid syndromes which should use 2.5 - 3.5.  See comment  INR not calculated due to Pt result >100 secs         Lymph #   1.2       Lymph %   21.8       Magnesium   2.0       MCH   30.6       MCHC   30.4       MCV   101       Mono #   1.0       Mono %   17.9       MPV   9.5       nRBC   0       Platelets   260       Potassium   4.5       PROTEIN TOTAL   6.4       Protime   >100.0       RBC   3.14       RDW   14.3       Sodium   142       Troponin I High Sensitivity 20.7  Comment: Troponin results differ between methods. Do not use   results between Troponin methods interchangeably.    Alkaline Phospatase levels above 400 U/L may   cause false positive results.    Access hsTnI should not be used for patients taking   Asfotase rosy (Strensiq).     22.3  Comment: Troponin results differ between methods. Do not use   results between Troponin methods interchangeably.    Alkaline Phospatase levels above 400 U/L may   cause false positive results.    Access hsTnI should not be used for patients taking   Asfotase rosy (Strensiq).         WBC   5.36               Significant Imaging: I have reviewed all pertinent imaging results/findings within the past 24 hours.    Assessment/Plan:     * Supratherapeutic INR  PT greater than 100 and INR unable to calculate.  Repeat PT INR pending.  No further bleeding from fistula site  Holding warfarin  CBC and INR daily      Atrial fibrillation with RVR  Continue diltiazem and metoprolol, holding warfarin    Anemia of chronic disease  Hemoglobin stable   CBC daily  Check iron panel and type and screen      ESRD on HD via are right  upper extremity AVF MWF  HD Monday Wednesday Friday   Nephrology consulted for routine HD      VTE Risk Mitigation (From admission, onward)         Ordered     IP VTE HIGH RISK PATIENT  Once         07/18/23 1915     Place sequential compression device  Until discontinued         07/18/23 1915                   On 07/18/2023, patient should be placed in hospital observation services under my care.        Imtiaz Fenton MD  Department of Hospital Medicine  [facility] - Emergency Dept

## 2023-07-19 NOTE — HPI
Patient is a 83-year-old female with history of ESRD on HD Monday Wednesday Friday and AFib on anticoagulation who presents with generalized weakness and bleeding from her right upper extremity fistula site.  Patient had routine HD yesterday and developed bleeding for fissure slight delay home.  Per patient, it was a lot of blood.  The bleeding later stopped.  Patient is on warfarin for AFib.  Patient had another episode of bleeding today.  EMS was called patient's house as she felt generally weak.  Patient was noted to be hypotensive was given a small fluid bolus with improvement in blood pressure.  In ED, there is no further evidence of bleeding.  Hemoglobin 9.6 which is near her baseline.  PT greater than 100 and INR unable to calculate.  Repeat PT INR pending.

## 2023-07-19 NOTE — PLAN OF CARE
[facility]  Initial Discharge Assessment       Primary Care Provider: Kalpesh Goel MD    Admission Diagnosis: Poisoning by warfarin sodium, undetermined intent, initial encounter [T45.514A]    Admission Date: 7/18/2023  Expected Discharge Date: 7/20/2023    Pt not in room, called sonRaffi to answer discharge assessment questions, verified PCP and information on facesheet.  Pt goes to dialysis at [facility] on [facility]. See DME below and has HH but son doesn't know name of agency.  Not sure who will drive pt home.  Needs to be determined.     Payor: [employer] MANAGED MEDICARE / Plan: [employer] MEDICARE HMO / Product Type: Capitation /     Extended Emergency Contact Information  Primary Emergency Contact: Raffi Isaac  Address: [address], [place] [id number] [place] [address]  Home Phone: [phone]  Mobile Phone: [phone]  Relation: Son   needed? No    Discharge Plan A: Home Health  Discharge Plan B: Home Health      [facility] [address] [facility] [address] [facility].  [address] [place] [id number]  Phone: [phone] Fax: [phone]      Initial Assessment (most recent)       Adult Discharge Assessment - 07/19/23 1619          Discharge Assessment    Assessment Type Discharge Planning Assessment     Confirmed/corrected address, phone number and insurance Yes     Confirmed Demographics Correct on Facesheet     Source of Information family     Communicated ILAN with patient/caregiver No     People in Home alone     Do you expect to return to your current living situation? Yes     Prior to hospitilization cognitive status: Alert/Oriented     Current cognitive status: Alert/Oriented     Walking or Climbing Stairs ambulation difficulty, requires equipment     Mobility Management rollator     Do you have any problems with: Errands/Grocery     Home Accessibility not wheelchair accessible;stairs to enter home     Number of Stairs, Main Entrance one      Home Layout Able to live on 1st floor     Equipment Currently Used at Home bedside commode;wheelchair;rollator   INR monitor    Patient currently being followed by outpatient case management? No     Do you currently have service(s) that help you manage your care at home? Yes     Name and Contact number of agency not sure of agency     Is the pt/caregiver preference to resume services with current agency Yes     Do you take prescription medications? Yes     Do you have prescription coverage? Yes     Do you have any problems affording any of your prescribed medications? No     Who is going to help you get home at discharge? not sure     How do you get to doctors appointments? family or friend will provide     Are you on dialysis? Yes     Dialysis Name and Scheduled days [facility]     Do you take coumadin? No     Discharge Plan A Home Health     Discharge Plan B Home Health     DME Needed Upon Discharge  none

## 2023-07-19 NOTE — ASSESSMENT & PLAN NOTE
Patient with Paroxysmal (<7 days) atrial fibrillation which is controlled currently with Beta Blocker and Calcium Channel Blocker. Patient is currently in sinus rhythm.[id number] Score: 4. . Anticoagulation Indicated, but on hold currently due to Supra therapeutic INR..

## 2023-07-19 NOTE — ASSESSMENT & PLAN NOTE
Patient with Paroxysmal (<7 days) atrial fibrillation which is controlled currently with Beta Blocker and Calcium Channel Blocker. Patient is currently in sinus rhythm.[id number] Score: 4. . Anticoagulation Indicated, but on hold currently due to Supra therapeutic INR.     Review of patient chart shows recent encounter for change of anticoagulation from warfarin to apixaban.  Was ordered 7/17/2023.  Patient has not started this medication yet.  Once INR within normal range, will start apixaban per patient's cardiologist and nephrologist recommendations.

## 2023-07-19 NOTE — ASSESSMENT & PLAN NOTE
PT greater than 100 and INR unable to calculate.  Repeat PT INR pending.  Received dose of oral vitamin K.   No further bleeding from fistula site  Holding warfarin  CBC and INR daily  Monitor for bleeding

## 2023-07-19 NOTE — PROGRESS NOTES
HD tx tolerated well  Net UF 2 L    Floor called at end of tx to say pt was sinus zane lower 40s, MD notified  Pt denies complaints       07/19/23 1700   Handoff Report   Received From Uyen Schneider   Given To Stevie   Vital Signs   Temp 98.2 °F (36.8 °C)   Pulse 60   Resp 18   SpO2 98 %   Pulse Oximetry Type Intermittent   Device (Oxygen Therapy) room air   BP (!) 139/52   BP Location Left arm   BP Method Automatic   Patient Position Lying   Assessments (Pre/Post)   Blood Liters Processed (BLP) 66   Transport Modality bed   Level of Consciousness (AVPU) alert        Hemodialysis AV Fistula Right upper arm   No Placement Date or Time found.   Present Prior to Hospital Arrival?: Yes  Location: Right upper arm   Site Assessment Clean;Dry;Intact   Patency Present;Thrill;Bruit   Status Deaccessed   Flows Good   Dressing Intervention First dressing   Dressing Status Clean;Dry;Intact   Site Condition No complications   Dressing Gauze   Post-Hemodialysis Assessment   Rinseback Volume (mL) 250 mL   Blood Volume Processed (Liters) 65.8 L   Dialyzer Clearance Moderately streaked   Duration of Treatment 180 minutes   Additional Fluid Intake (mL) 500 mL   Total UF (mL) 2500 mL   Net Fluid Removal 2000   Patient Response to Treatment Tolerated well   Arterial bleeding stop time (min) 10 min   Venous bleeding stop time (min) 10 min   Post-Hemodialysis Comments Tx tolerated well, no issues

## 2023-07-19 NOTE — ASSESSMENT & PLAN NOTE
PT greater than 100 and INR unable to calculate.  Repeat PT INR pending.  No further bleeding from fistula site  Holding warfarin  CBC and INR daily

## 2023-07-19 NOTE — NURSING
Nurses Note -- 4 Eyes      7/18/2023   9:21 PM      Skin assessed during: Admit      [x] No Altered Skin Integrity Present    [x]Prevention Measures Documented      [] Yes- Altered Skin Integrity Present or Discovered   [] LDA Added if Not in Epic (Describe Wound)   [] New Altered Skin Integrity was Present on Admit and Documented in LDA   [] Wound Image Taken    Wound Care Consulted? No    Attending Nurse:  Kelle Foley RN     Second RN/Staff Member:  [id number]

## 2023-07-19 NOTE — SUBJECTIVE & OBJECTIVE
Past Medical History:   Diagnosis Date    A-fib     Anxiety     Depression     Disorder of kidney and ureter     Encephalopathy acute 1/1/2018    End stage kidney disease 6/17/2017    Gout     Hyperlipidemia     Hypertension     Moderate episode of recurrent major depressive disorder 1/17/2018    Nephropathy hypertensive, 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    Osteopenia of multiple sites 3/9/2018    Based upon bone density measurements. Patient also has chronic kidney disease.    Stroke 11/2016       Past Surgical History:   Procedure Laterality Date    ANGIOGRAM, CORONARY, WITH LEFT HEART CATHETERIZATION N/A 2/7/2022    Procedure: Angiogram, Coronary, with Left Heart Cath;  Surgeon: Gino Leal MD;  Location: [facility] CATH/EP LAB;  Service: Cardiology;  Laterality: N/A;    CARDIAC SURGERY      stents    EYE SURGERY      WRIST SURGERY         Review of patient's allergies indicates:   Allergen Reactions    Cyclobenzaprine     Fish containing products Hives    Peanut Other (See Comments)    Tramadol Itching       No current facility-administered medications on file prior to encounter.     Current Outpatient Medications on File Prior to Encounter   Medication Sig    atorvastatin (LIPITOR) 40 MG tablet Take 40 mg by mouth once daily.    b complex vitamins tablet Take 1 tablet by mouth once daily.    calcium acetate,phosphat bind, (PHOSLO) 667 mg tablet Take 667 mg by mouth 2 (two) times daily with meals.    diltiaZEM (CARDIZEM CD) 120 MG Cp24 Take 1 capsule (120 mg total) by mouth 2 (two) times a day.    docusate sodium (COLACE) 100 MG capsule Take 100 mg by mouth once daily.    dorzolamide-timolol 2-0.5% (COSOPT) 22.3-6.8 mg/mL ophthalmic solution Place 1 drop into both eyes 2 (two) times daily.    latanoprost 0.005 % ophthalmic solution Place 1 drop into both eyes every evening.     metoprolol tartrate (LOPRESSOR) 25 MG tablet Take 1 tablet (25 mg total) by mouth 2 (two) times daily.    warfarin (COUMADIN) 3 MG tablet Take 3 mg by mouth every Mon, Wed, Fri.    warfarin (COUMADIN) 3 MG tablet Take 1.5 mg by mouth every Tuesday, Thursday, Saturday, Sunday.    ELIQUIS 2.5 mg Tab Take 2.5 mg by mouth 2 (two) times daily. NEW Rx - NOT YET STARTED     Family History       Problem Relation (Age of Onset)    Cancer Father    Heart disease Mother          Tobacco Use    Smoking status: Never    Smokeless tobacco: Never   Substance and Sexual Activity    Alcohol use: No    Drug use: No    Sexual activity: Not Currently     Review of Systems   Constitutional:  Positive for fatigue. Negative for chills and fever.   HENT: Negative.     Eyes: Negative.    Respiratory: Negative.     Cardiovascular: Negative.    Gastrointestinal: Negative.    Endocrine: Negative.    Genitourinary: Negative.    Musculoskeletal: Negative.    Skin: Negative.    Neurological:  Positive for weakness. Negative for dizziness, facial asymmetry, light-headedness and headaches.   Hematological:  Negative for adenopathy. Bruises/bleeds easily.   Psychiatric/Behavioral: Negative.     Objective:     Vital Signs (Most Recent):  Temp: 98.5 °F (36.9 °C) (07/18/23 1555)  Pulse: (!) 54 (07/18/23 1824)  Resp: 18 (07/18/23 1824)  BP: 136/63 (07/18/23 1824)  SpO2: 97 % (07/18/23 1824) Vital Signs (24h Range):  Temp:  [98.5 °F (36.9 °C)] 98.5 °F (36.9 °C)  Pulse:  [54-66] 54  Resp:  [18] 18  SpO2:  [97 %-99 %] 97 %  BP: (122-136)/(50-63) 136/63     Weight: 49.9 kg (110 lb)  Body mass index is 18.3 kg/m².     Physical Exam  Vitals and nursing note reviewed.   Constitutional:       General: She is not in acute distress.     Appearance: Normal appearance.   HENT:      Head: Normocephalic and atraumatic.      Nose: Nose normal.   Eyes:      Extraocular Movements: Extraocular movements intact.      Conjunctiva/sclera: Conjunctivae normal.      Pupils:  Pupils are equal, round, and reactive to light.   Cardiovascular:      Rate and Rhythm: Normal rate and regular rhythm.   Pulmonary:      Effort: Pulmonary effort is normal. No respiratory distress.      Breath sounds: Normal breath sounds. No stridor. No wheezing or rales.   Abdominal:      General: Bowel sounds are normal. There is no distension.      Palpations: Abdomen is soft.      Tenderness: There is no abdominal tenderness.   Musculoskeletal:         General: No swelling or tenderness. Normal range of motion.      Cervical back: Normal range of motion and neck supple.      Right lower leg: No edema.      Left lower leg: No edema.   Skin:     General: Skin is warm and dry.      Comments: Fistula right upper extremity, nonbleeding   Neurological:      General: No focal deficit present.      Mental Status: She is alert and oriented to person, place, and time.   Psychiatric:         Mood and Affect: Mood normal.         Behavior: Behavior normal.         Thought Content: Thought content normal.         Judgment: Judgment normal.            CRANIAL NERVES     CN III, IV, VI   Pupils are equal, round, and reactive to light.     Significant Labs: All pertinent labs within the past 24 hours have been reviewed.  Recent Lab Results         07/18/23  1744   07/18/23  1605        Albumin   3.4       Alkaline Phosphatase   48       ALT   66       Anion Gap   9       AST   31       Baso #   0.03       Basophil %   0.6       BILIRUBIN TOTAL   0.8  Comment: For infants and newborns, interpretation of results should be based  on gestational age, weight and in agreement with clinical  observations.    Premature Infant recommended reference ranges:  Up to 24 hours.............<8.0 mg/dL  Up to 48 hours............<12.0 mg/dL  3-5 days..................<15.0 mg/dL  6-29 days.................<15.0 mg/dL         BNP   1,147  Comment: Values of less than 100 pg/ml are consistent with non-CHF populations.       BUN   35        Calcium   8.9       Chloride   100       CO2   33       Creatinine   8.1       Differential Method   Automated       eGFR   4.5       Eos #   0.8       Eosinophil %   14.0       Glucose   83       Gran # (ANC)   2.4       Gran %   45.5       Hematocrit   31.6       Hemoglobin   9.6       Immature Grans (Abs)   0.01  Comment: Mild elevation in immature granulocytes is non specific and   can be seen in a variety of conditions including stress response,   acute inflammation, trauma and pregnancy. Correlation with other   laboratory and clinical findings is essential.         Immature Granulocytes   0.2       INR   SEE COMMENT  Comment: Coumadin Therapy:  2.0 - 3.0 for INR for all indicators except mechanical heart valves  and antiphospholipid syndromes which should use 2.5 - 3.5.  See comment  INR not calculated due to Pt result >100 secs         Lymph #   1.2       Lymph %   21.8       Magnesium   2.0       MCH   30.6       MCHC   30.4       MCV   101       Mono #   1.0       Mono %   17.9       MPV   9.5       nRBC   0       Platelets   260       Potassium   4.5       PROTEIN TOTAL   6.4       Protime   >100.0       RBC   3.14       RDW   14.3       Sodium   142       Troponin I High Sensitivity 20.7  Comment: Troponin results differ between methods. Do not use   results between Troponin methods interchangeably.    Alkaline Phospatase levels above 400 U/L may   cause false positive results.    Access hsTnI should not be used for patients taking   Asfotase rosy (Strensiq).     22.3  Comment: Troponin results differ between methods. Do not use   results between Troponin methods interchangeably.    Alkaline Phospatase levels above 400 U/L may   cause false positive results.    Access hsTnI should not be used for patients taking   Asfotase rosy (Strensiq).         WBC   5.36               Significant Imaging: I have reviewed all pertinent imaging results/findings within the past 24 hours.

## 2023-07-20 LAB
ANION GAP SERPL CALC-SCNC: 9 MMOL/L (ref 8–16)
BUN SERPL-MCNC: 25 MG/DL (ref 8–23)
CALCIUM SERPL-MCNC: 9 MG/DL (ref 8.7–10.5)
CHLORIDE SERPL-SCNC: 107 MMOL/L (ref 95–110)
CO2 SERPL-SCNC: 22 MMOL/L (ref 23–29)
CREAT SERPL-MCNC: 6.4 MG/DL (ref 0.5–1.4)
ERYTHROCYTE [DISTWIDTH] IN BLOOD BY AUTOMATED COUNT: 13.8 % (ref 11.5–14.5)
EST. GFR  (NO RACE VARIABLE): 6 ML/MIN/1.73 M^2
GLUCOSE SERPL-MCNC: 81 MG/DL (ref 70–110)
HCT VFR BLD AUTO: 32 % (ref 37–48.5)
HGB BLD-MCNC: 9.9 G/DL (ref 12–16)
INR PPP: 1.6 (ref 0.8–1.2)
MCH RBC QN AUTO: 31.1 PG (ref 27–31)
MCHC RBC AUTO-ENTMCNC: 30.9 G/DL (ref 32–36)
MCV RBC AUTO: 101 FL (ref 82–98)
PLATELET # BLD AUTO: 266 K/UL (ref 150–450)
PMV BLD AUTO: 9.5 FL (ref 9.2–12.9)
POTASSIUM SERPL-SCNC: 5 MMOL/L (ref 3.5–5.1)
PROTHROMBIN TIME: 17.6 SEC (ref 9–12.5)
RBC # BLD AUTO: 3.18 M/UL (ref 4–5.4)
SODIUM SERPL-SCNC: 138 MMOL/L (ref 136–145)
WBC # BLD AUTO: 7.1 K/UL (ref 3.9–12.7)

## 2023-07-20 PROCEDURE — 25000003 PHARM REV CODE 250: Performed by: HOSPITALIST

## 2023-07-20 PROCEDURE — 85610 PROTHROMBIN TIME: CPT | Performed by: NURSE PRACTITIONER

## 2023-07-20 PROCEDURE — 12000002 HC ACUTE/MED SURGE SEMI-PRIVATE ROOM

## 2023-07-20 PROCEDURE — 25000003 PHARM REV CODE 250: Performed by: NURSE PRACTITIONER

## 2023-07-20 PROCEDURE — 80048 BASIC METABOLIC PNL TOTAL CA: CPT | Performed by: STUDENT IN AN ORGANIZED HEALTH CARE EDUCATION/TRAINING PROGRAM

## 2023-07-20 PROCEDURE — 36415 COLL VENOUS BLD VENIPUNCTURE: CPT | Performed by: STUDENT IN AN ORGANIZED HEALTH CARE EDUCATION/TRAINING PROGRAM

## 2023-07-20 PROCEDURE — 85027 COMPLETE CBC AUTOMATED: CPT | Performed by: STUDENT IN AN ORGANIZED HEALTH CARE EDUCATION/TRAINING PROGRAM

## 2023-07-20 PROCEDURE — 25000003 PHARM REV CODE 250: Performed by: STUDENT IN AN ORGANIZED HEALTH CARE EDUCATION/TRAINING PROGRAM

## 2023-07-20 PROCEDURE — A4216 STERILE WATER/SALINE, 10 ML: HCPCS | Performed by: STUDENT IN AN ORGANIZED HEALTH CARE EDUCATION/TRAINING PROGRAM

## 2023-07-20 PROCEDURE — 36415 COLL VENOUS BLD VENIPUNCTURE: CPT | Performed by: NURSE PRACTITIONER

## 2023-07-20 PROCEDURE — 94761 N-INVAS EAR/PLS OXIMETRY MLT: CPT

## 2023-07-20 RX ORDER — MUPIROCIN 20 MG/G
OINTMENT TOPICAL 2 TIMES DAILY
Status: DISCONTINUED | OUTPATIENT
Start: 2023-07-20 | End: 2023-07-21 | Stop reason: HOSPADM

## 2023-07-20 RX ORDER — METOPROLOL TARTRATE 25 MG/1
25 TABLET, FILM COATED ORAL 2 TIMES DAILY
Status: DISCONTINUED | OUTPATIENT
Start: 2023-07-21 | End: 2023-07-21 | Stop reason: HOSPADM

## 2023-07-20 RX ADMIN — MUPIROCIN 1 G: 20 OINTMENT TOPICAL at 08:07

## 2023-07-20 RX ADMIN — MUPIROCIN: 20 OINTMENT TOPICAL at 09:07

## 2023-07-20 RX ADMIN — CALCIUM ACETATE 667 MG: 667 CAPSULE ORAL at 08:07

## 2023-07-20 RX ADMIN — DILTIAZEM HYDROCHLORIDE 120 MG: 120 CAPSULE, COATED, EXTENDED RELEASE ORAL at 08:07

## 2023-07-20 RX ADMIN — SODIUM CHLORIDE, PRESERVATIVE FREE 10 ML: 5 INJECTION INTRAVENOUS at 09:07

## 2023-07-20 RX ADMIN — APIXABAN 2.5 MG: 2.5 TABLET, FILM COATED ORAL at 08:07

## 2023-07-20 RX ADMIN — ATORVASTATIN CALCIUM 40 MG: 40 TABLET, FILM COATED ORAL at 08:07

## 2023-07-20 NOTE — PROGRESS NOTES
formerly [facility] Medicine  Progress Note    Patient Name: Tyra Isaac  MRN: [id number]  Patient Class: IP- Inpatient   Admission Date: 7/18/2023  Length of Stay: 1 days  Attending Physician: Indu Del Valle MD  Primary Care Provider: Kalpesh Goel MD        Subjective:     Principal Problem:Supratherapeutic INR        HPI:  Patient is a 83-year-old female with history of ESRD on HD Monday Wednesday Friday and AFib on anticoagulation who presents with generalized weakness and bleeding from her right upper extremity fistula site.  Patient had routine HD yesterday and developed bleeding for fissure slight delay home.  Per patient, it was a lot of blood.  The bleeding later stopped.  Patient is on warfarin for AFib.  Patient had another episode of bleeding today.  EMS was called patient's house as she felt generally weak.  Patient was noted to be hypotensive was given a small fluid bolus with improvement in blood pressure.  In ED, there is no further evidence of bleeding.  Hemoglobin 9.6 which is near her baseline.  PT greater than 100 and INR unable to calculate.  Repeat PT INR pending.      Overview/Hospital Course:  No notes on file    Interval History:  Patient seen and examined.  No acute events since admission. Feeling a little better today.  No further bleeding noted. PT/INR pending. Discussed with patient transition from coumadin to apixiban.     Review of Systems   Neurological:  Positive for weakness (Generalized).   All other systems reviewed and are negative.  Objective:     Vital Signs (Most Recent):  Temp: 98.5 °F (36.9 °C) (07/20/23 1158)  Pulse: (!) 54 (07/20/23 1158)  Resp: 19 (07/20/23 1158)  BP: (!) 183/65 (07/20/23 1158)  SpO2: 95 % (07/20/23 1158) Vital Signs (24h Range):  Temp:  [98.2 °F (36.8 °C)-98.7 °F (37.1 °C)] 98.5 °F (36.9 °C)  Pulse:  [44-81] 54  Resp:  [16-20] 19  SpO2:  [95 %-98 %] 95 %  BP: (106-183)/(49-67) 183/65     Weight: 51.5 kg (113 lb 9.6 oz)  Body mass  index is 18.9 kg/m².    Intake/Output Summary (Last 24 hours) at 7/20/2023 1224  Last data filed at 7/20/2023 0909  Gross per 24 hour   Intake 1120 ml   Output 2500 ml   Net -1380 ml           Physical Exam  Vitals and nursing note reviewed.   Constitutional:       General: She is not in acute distress.     Appearance: She is not ill-appearing, toxic-appearing or diaphoretic.   Cardiovascular:      Rate and Rhythm: Normal rate and regular rhythm.      Pulses: Normal pulses.      Heart sounds: Normal heart sounds.      Comments: Right upper arm AV fistula positive bruit, positive thrill  Pulmonary:      Effort: Pulmonary effort is normal. No respiratory distress.      Breath sounds: Normal breath sounds. No wheezing, rhonchi or rales.   Abdominal:      General: Bowel sounds are normal. There is no distension.      Palpations: Abdomen is soft.      Tenderness: There is no abdominal tenderness. There is no guarding.   Skin:     General: Skin is warm and dry.      Coloration: Skin is not jaundiced or pale.      Comments: No signs of bleeding at AV fistula site.   Neurological:      Mental Status: She is alert and oriented to person, place, and time. Mental status is at baseline.           Significant Labs: All pertinent labs within the past 24 hours have been reviewed.  CBC:   Recent Labs   Lab 07/18/23  1605 07/19/23  0607 07/20/23  0452   WBC 5.36 5.70 7.10   HGB 9.6* 8.8* 9.9*   HCT 31.6* 29.1* 32.0*    264 266       CMP:   Recent Labs   Lab 07/18/23  1605 07/19/23  0607 07/20/23  0453    142 138   K 4.5 4.9 5.0    105 107   CO2 33* 29 22*   GLU 83 91 81   BUN 35* 42* 25*   CREATININE 8.1* 8.9* 6.4*   CALCIUM 8.9 8.4* 9.0   PROT 6.4  --   --    ALBUMIN 3.4*  --   --    BILITOT 0.8  --   --    ALKPHOS 48*  --   --    AST 31  --   --    ALT 66*  --   --    ANIONGAP 9 8 9             Significant Imaging: I have reviewed all pertinent imaging results/findings within the past 24  hours.      Assessment/Plan:      * Supratherapeutic INR  PT greater than 100 and INR unable to calculate.  Repeat PT INR pending.  Received dose of oral vitamin K.   No further bleeding from fistula site  CBC and INR daily  Monitor for bleeding  Stop warfarin and transition to Apixiban once PT/INR normalized    Atrial fibrillation  Patient with Paroxysmal (<7 days) atrial fibrillation which is controlled currently with Beta Blocker and Calcium Channel Blocker. Patient is currently in sinus rhythm.[id number] Score: 4. . Anticoagulation Indicated, but on hold currently due to Supra therapeutic INR.     Review of patient chart shows recent encounter for change of anticoagulation from warfarin to apixaban.  Was ordered 7/17/2023.  Patient has not started this medication yet.  Once INR within normal range, will start apixaban per patient's cardiologist and nephrologist recommendations.    Anemia of chronic disease  Hemoglobin stable   CBC daily  Check iron panel and type and screen      ESRD on HD via are right upper extremity AVF MWF  HD Monday Wednesday Friday   Nephrology consulted for routine HD        VTE Risk Mitigation (From admission, onward)         Ordered     apixaban tablet 2.5 mg  2 times daily         07/20/23 1221     IP VTE HIGH RISK PATIENT  Once         07/18/23 1915     Place sequential compression device  Until discontinued         07/18/23 1915                Discharge Planning   ILAN: 7/20/2023     Code Status: Full Code   Is the patient medically ready for discharge?:     Reason for patient still in hospital (select all that apply): Patient trending condition, Laboratory test and Treatment  Discharge Plan A: Home Health                  Maryjane Hua NP  Department of Hospital Medicine   [facility]

## 2023-07-20 NOTE — PLAN OF CARE
Problem: Adult Inpatient Plan of Care  Goal: Plan of Care Review  Outcome: Ongoing, Progressing  Goal: Patient-Specific Goal (Individualized)  Outcome: Ongoing, Progressing  Goal: Absence of Hospital-Acquired Illness or Injury  Outcome: Ongoing, Progressing  Goal: Optimal Comfort and Wellbeing  Outcome: Ongoing, Progressing  Goal: Readiness for Transition of Care  Outcome: Ongoing, Progressing     Problem: Fall Injury Risk  Goal: Absence of Fall and Fall-Related Injury  Outcome: Ongoing, Progressing     Problem: Device-Related Complication Risk (Hemodialysis)  Goal: Safe, Effective Therapy Delivery  Outcome: Ongoing, Progressing     Problem: Hemodynamic Instability (Hemodialysis)  Goal: Effective Tissue Perfusion  Outcome: Ongoing, Progressing     Problem: Infection (Hemodialysis)  Goal: Absence of Infection Signs and Symptoms  Outcome: Ongoing, Progressing

## 2023-07-20 NOTE — PLAN OF CARE
Explained to pt the MOON and pt signed.     07/20/23 0837   WARD Message   Medicare Outpatient and Observation Notification regarding financial responsibility Explained to patient/caregiver;Signed/date by patient/caregiver   Date WARD was signed 07/20/23   Time WARD was signed 0837     Pt doesn't remember the name of HH agency.  However, in [facility] is listed as [facility].

## 2023-07-20 NOTE — ASSESSMENT & PLAN NOTE
PT greater than 100 and INR unable to calculate.  Repeat PT INR pending.  Received dose of oral vitamin K.   No further bleeding from fistula site  CBC and INR daily  Monitor for bleeding  Stop warfarin and transition to Apixiban once PT/INR normalized

## 2023-07-20 NOTE — PLAN OF CARE
07/19/23 2146   Patient Assessment/Suction   Level of Consciousness (AVPU) alert   Respiratory Effort Normal;Unlabored   PRE-TX-O2   Device (Oxygen Therapy) room air   SpO2 98 %   Pulse Oximetry Type Intermittent   $ Pulse Oximetry - Multiple Charge Pulse Oximetry - Multiple   Pulse 72   Resp 18   Respiratory Evaluation   $ Care Plan Tech Time 15 min

## 2023-07-20 NOTE — SUBJECTIVE & OBJECTIVE
Interval History:  Patient seen and examined.  No acute events since admission. Feeling a little better today.  No further bleeding noted. PT/INR pending. Discussed with patient transition from coumadin to apixiban.     Review of Systems   Neurological:  Positive for weakness (Generalized).   All other systems reviewed and are negative.  Objective:     Vital Signs (Most Recent):  Temp: 98.5 °F (36.9 °C) (07/20/23 1158)  Pulse: (!) 54 (07/20/23 1158)  Resp: 19 (07/20/23 1158)  BP: (!) 183/65 (07/20/23 1158)  SpO2: 95 % (07/20/23 1158) Vital Signs (24h Range):  Temp:  [98.2 °F (36.8 °C)-98.7 °F (37.1 °C)] 98.5 °F (36.9 °C)  Pulse:  [44-81] 54  Resp:  [16-20] 19  SpO2:  [95 %-98 %] 95 %  BP: (106-183)/(49-67) 183/65     Weight: 51.5 kg (113 lb 9.6 oz)  Body mass index is 18.9 kg/m².    Intake/Output Summary (Last 24 hours) at 7/20/2023 1224  Last data filed at 7/20/2023 0909  Gross per 24 hour   Intake 1120 ml   Output 2500 ml   Net -1380 ml           Physical Exam  Vitals and nursing note reviewed.   Constitutional:       General: She is not in acute distress.     Appearance: She is not ill-appearing, toxic-appearing or diaphoretic.   Cardiovascular:      Rate and Rhythm: Normal rate and regular rhythm.      Pulses: Normal pulses.      Heart sounds: Normal heart sounds.      Comments: Right upper arm AV fistula positive bruit, positive thrill  Pulmonary:      Effort: Pulmonary effort is normal. No respiratory distress.      Breath sounds: Normal breath sounds. No wheezing, rhonchi or rales.   Abdominal:      General: Bowel sounds are normal. There is no distension.      Palpations: Abdomen is soft.      Tenderness: There is no abdominal tenderness. There is no guarding.   Skin:     General: Skin is warm and dry.      Coloration: Skin is not jaundiced or pale.      Comments: No signs of bleeding at AV fistula site.   Neurological:      Mental Status: She is alert and oriented to person, place, and time. Mental status  is at baseline.           Significant Labs: All pertinent labs within the past 24 hours have been reviewed.  CBC:   Recent Labs   Lab 07/18/23  1605 07/19/23  0607 07/20/23  0452   WBC 5.36 5.70 7.10   HGB 9.6* 8.8* 9.9*   HCT 31.6* 29.1* 32.0*    264 266       CMP:   Recent Labs   Lab 07/18/23  1605 07/19/23  0607 07/20/23  0453    142 138   K 4.5 4.9 5.0    105 107   CO2 33* 29 22*   GLU 83 91 81   BUN 35* 42* 25*   CREATININE 8.1* 8.9* 6.4*   CALCIUM 8.9 8.4* 9.0   PROT 6.4  --   --    ALBUMIN 3.4*  --   --    BILITOT 0.8  --   --    ALKPHOS 48*  --   --    AST 31  --   --    ALT 66*  --   --    ANIONGAP 9 8 9             Significant Imaging: I have reviewed all pertinent imaging results/findings within the past 24 hours.

## 2023-07-20 NOTE — PROGRESS NOTES
INPATIENT NEPHROLOGY Progress Note  [facility]    Patient Name: Tyra Isaac  Date: 07/20/2023    Reason for consultation: ESRD    Chief Complaint:   Chief Complaint   Patient presents with    Weakness     Dialysis pt presents to ed complaining of generalized weakness since dialysis yesterday. Fistula was bleeding heavily according to family members. Pt has been complaining of severe weakness since. Unsure how much fluid was taken off.        History of Present Illness:   Patient is a 83-year-old female with history of ESRD on HD Monday Wednesday Friday and AFib on anticoagulation who presents with generalized weakness and bleeding from her right upper extremity fistula site.  Patient had routine HD yesterday and developed bleeding for fissure slight delay home.  Per patient, it was a lot of blood.  The bleeding later stopped.  Patient is on warfarin for AFib.  Patient had another episode of bleeding today.  EMS was called patient's house as she felt generally weak.  Patient was noted to be hypotensive was given a small fluid bolus with improvement in blood pressure.  In ED, there is no further evidence of bleeding.  Hemoglobin 9.6 which is near her baseline.  PT greater than 100 and INR unable to calculate. Consulted for dialysis.     Interval History:  7/19- she says she thinks she was getting coumadin twice per day for the last week- she noticed it was different- she says the nurse who filled her weekly box said she was following orders- will need assistance from SW/KATY to contact nurse and find out what orders she was following  7/29- once INR down, patient will be switched to eliquis    Plan of Care:    Assessment:  ESRD on HD MWF  HTN  SHPT  Anemia of CKD  Bleeding due to supratherapeutic INR- hx of AF on AC    Plan:    - continue HD MWF  - UF 2L  - continue binder with meals  - no acute transfusion needs- continue BRAYAN with HD  - will be switched to eliquis     Thank you for allowing  us to participate in this patient's care. We will continue to follow.    Vital Signs:  Temp Readings from Last 3 Encounters:   07/20/23 98.7 °F (37.1 °C) (Oral)   06/29/23 98.2 °F (36.8 °C) (Oral)   04/01/23 97.3 °F (36.3 °C) (Oral)       Pulse Readings from Last 3 Encounters:   07/20/23 (!) 54   06/29/23 82   04/01/23 (!) 51       BP Readings from Last 3 Encounters:   07/20/23 (!) 143/66   06/29/23 (!) 164/73   04/01/23 (!) 119/50       Weight:  Wt Readings from Last 3 Encounters:   07/18/23 51.5 kg (113 lb 9.6 oz)   06/28/23 49.9 kg (110 lb)   03/31/23 51.3 kg (113 lb)     Medications:  Scheduled Meds:   atorvastatin  40 mg Oral Daily    calcium acetate(phosphat bind)  667 mg Oral Daily    diltiaZEM  120 mg Oral Daily    epoetin rosy (PROCRIT) injection  100 Units/kg Subcutaneous Every Mon, Wed, Fri    epoetin rosy-ebpx (RETACRIT) injection  100 Units/kg Intravenous Every Mon, Wed, Fri    [START ON 7/21/2023] metoprolol tartrate  25 mg Oral BID    mupirocin   Nasal BID    sodium chloride 0.9%  10 mL Intravenous Q12H     Continuous Infusions:  PRN Meds:.acetaminophen, ondansetron  No current facility-administered medications on file prior to encounter.     Current Outpatient Medications on File Prior to Encounter   Medication Sig Dispense Refill    atorvastatin (LIPITOR) 40 MG tablet Take 40 mg by mouth once daily.      b complex vitamins tablet Take 1 tablet by mouth once daily.      calcium acetate,phosphat bind, (PHOSLO) 667 mg tablet Take 667 mg by mouth 2 (two) times daily with meals.      diltiaZEM (CARDIZEM CD) 120 MG Cp24 Take 1 capsule (120 mg total) by mouth 2 (two) times a day.      docusate sodium (COLACE) 100 MG capsule Take 100 mg by mouth once daily.      dorzolamide-timolol 2-0.5% (COSOPT) 22.3-6.8 mg/mL ophthalmic solution Place 1 drop into both eyes 2 (two) times daily.      latanoprost 0.005 % ophthalmic solution Place 1 drop into both eyes every evening.      metoprolol tartrate (LOPRESSOR) 25  MG tablet Take 1 tablet (25 mg total) by mouth 2 (two) times daily. 60 tablet 11    warfarin (COUMADIN) 3 MG tablet Take 3 mg by mouth every Mon, Wed, Fri.      warfarin (COUMADIN) 3 MG tablet Take 1.5 mg by mouth every Tuesday, Thursday, Saturday, Sunday.      ELIQUIS 2.5 mg Tab Take 2.5 mg by mouth 2 (two) times daily. NEW Rx - NOT YET STARTED         Review of Systems:  Neg    Physical Exam:  General Appearance:    NAD, AAO x 3, cooperative, appears stated age   Head:    Normocephalic, atraumatic   Eyes:    PER, EOMI, and conjunctiva/sclera clear bilaterally       Mouth:   Moist mucus membranes, no thrush or oral lesions,       normal dentition   Back:     No CVA tenderness   Lungs:     Clear to auscultation bilaterally, no wheezes, crackles,           rales or rhonchi, symmetric air movement, respirations unlabored   Chest wall:    No tenderness or deformity   Heart:    Regular rate and rhythm, S1 and S2 normal, no murmur, rub   or gallop   Abdomen:     Soft, non-tender, non-distended, bowel sounds active all four   quadrants, no RT or guarding, no masses, no organomegaly   Extremities:   Warm and well perfused, distal pulses are intact, no             cyanosis or peripheral edema   MSK:   No joint or muscle swelling, tenderness or deformity   Skin:   Skin color, texture, turgor normal, no rashes or lesions   Neurologic/Psychiatric:   CNII-XII intact, normal strength and sensation       throughout, no asterixis; normal affect, memory, judgement     and insight      Results:  Lab Results   Component Value Date     07/20/2023    K 5.0 07/20/2023     07/20/2023    CO2 22 (L) 07/20/2023    BUN 25 (H) 07/20/2023    CREATININE 6.4 (H) 07/20/2023    CALCIUM 9.0 07/20/2023    ANIONGAP 9 07/20/2023    ESTGFRAFRICA 4.7 (A) 02/08/2022    EGFRNONAA 4.0 (A) 02/08/2022       Lab Results   Component Value Date    CALCIUM 9.0 07/20/2023    PHOS 3.2 06/29/2023       Recent Labs   Lab 07/20/23  0452   WBC 7.10   RBC  3.18*   HGB 9.9*   HCT 32.0*      *   MCH 31.1*   MCHC 30.9*         I have personally reviewed pertinent radiological imaging and reports.    I have spent > 35 minutes providing care for this patient for the above diagnoses. These services have included chart/data/imaging review, evaluation, exam, formulation of plan, , note preparation, and discussions with staff involved in this patient's care.    Rose Maynard MD MPH  [facility] [address] [address]  [phone] (p)  [phone] (f)

## 2023-07-21 ENCOUNTER — TELEPHONE (OUTPATIENT)
Dept: FAMILY MEDICINE | Facility: CLINIC | Age: 83
End: 2023-07-21

## 2023-07-21 VITALS
DIASTOLIC BLOOD PRESSURE: 58 MMHG | SYSTOLIC BLOOD PRESSURE: 114 MMHG | BODY MASS INDEX: 18.93 KG/M2 | HEIGHT: 65 IN | TEMPERATURE: 98 F | WEIGHT: 113.63 LBS | RESPIRATION RATE: 18 BRPM | OXYGEN SATURATION: 97 % | HEART RATE: 53 BPM

## 2023-07-21 LAB
ANION GAP SERPL CALC-SCNC: 8 MMOL/L (ref 8–16)
BUN SERPL-MCNC: 35 MG/DL (ref 8–23)
CALCIUM SERPL-MCNC: 9 MG/DL (ref 8.7–10.5)
CHLORIDE SERPL-SCNC: 108 MMOL/L (ref 95–110)
CO2 SERPL-SCNC: 21 MMOL/L (ref 23–29)
CREAT SERPL-MCNC: 8.6 MG/DL (ref 0.5–1.4)
ERYTHROCYTE [DISTWIDTH] IN BLOOD BY AUTOMATED COUNT: 13.5 % (ref 11.5–14.5)
EST. GFR  (NO RACE VARIABLE): 4.2 ML/MIN/1.73 M^2
GLUCOSE SERPL-MCNC: 101 MG/DL (ref 70–110)
HCT VFR BLD AUTO: 32.9 % (ref 37–48.5)
HGB BLD-MCNC: 10.2 G/DL (ref 12–16)
INR PPP: 1.5 (ref 0.8–1.2)
MCH RBC QN AUTO: 31 PG (ref 27–31)
MCHC RBC AUTO-ENTMCNC: 31 G/DL (ref 32–36)
MCV RBC AUTO: 100 FL (ref 82–98)
PLATELET # BLD AUTO: 275 K/UL (ref 150–450)
PMV BLD AUTO: 9.7 FL (ref 9.2–12.9)
POTASSIUM SERPL-SCNC: 4.8 MMOL/L (ref 3.5–5.1)
PROTHROMBIN TIME: 16.2 SEC (ref 9–12.5)
RBC # BLD AUTO: 3.29 M/UL (ref 4–5.4)
SODIUM SERPL-SCNC: 137 MMOL/L (ref 136–145)
WBC # BLD AUTO: 7.05 K/UL (ref 3.9–12.7)

## 2023-07-21 PROCEDURE — 94761 N-INVAS EAR/PLS OXIMETRY MLT: CPT

## 2023-07-21 PROCEDURE — 80048 BASIC METABOLIC PNL TOTAL CA: CPT | Performed by: STUDENT IN AN ORGANIZED HEALTH CARE EDUCATION/TRAINING PROGRAM

## 2023-07-21 PROCEDURE — A4216 STERILE WATER/SALINE, 10 ML: HCPCS | Performed by: STUDENT IN AN ORGANIZED HEALTH CARE EDUCATION/TRAINING PROGRAM

## 2023-07-21 PROCEDURE — 85610 PROTHROMBIN TIME: CPT | Performed by: NURSE PRACTITIONER

## 2023-07-21 PROCEDURE — 90935 HEMODIALYSIS ONE EVALUATION: CPT

## 2023-07-21 PROCEDURE — 36415 COLL VENOUS BLD VENIPUNCTURE: CPT | Performed by: NURSE PRACTITIONER

## 2023-07-21 PROCEDURE — 85027 COMPLETE CBC AUTOMATED: CPT | Performed by: STUDENT IN AN ORGANIZED HEALTH CARE EDUCATION/TRAINING PROGRAM

## 2023-07-21 PROCEDURE — 63600175 PHARM REV CODE 636 W HCPCS: Mod: JZ | Performed by: INTERNAL MEDICINE

## 2023-07-21 PROCEDURE — 25000003 PHARM REV CODE 250: Performed by: STUDENT IN AN ORGANIZED HEALTH CARE EDUCATION/TRAINING PROGRAM

## 2023-07-21 RX ADMIN — SODIUM CHLORIDE, PRESERVATIVE FREE 10 ML: 5 INJECTION INTRAVENOUS at 09:07

## 2023-07-21 RX ADMIN — ERYTHROPOIETIN 5200 UNITS: 10000 INJECTION, SOLUTION INTRAVENOUS; SUBCUTANEOUS at 11:07

## 2023-07-21 NOTE — HOSPITAL COURSE
Patient was admitted with Coumadin toxicity.  She was monitored closely during her stay.  Her labs and vital signs were trended closely.  Nephrology was consulted for routine dialysis.  She was given oral dose of vitamin K. Her Coumadin was held and her PT and INR fell to near normal levels quickly.  She was transition to oral apixaban.  Patient was seen and evaluated on day of discharge and deemed appropriate.  Patient was counseled to stop taking Coumadin.  Discharge instructions as well as return precautions were discussed with patient with good understanding.

## 2023-07-21 NOTE — TELEPHONE ENCOUNTER
Attempted to contact pt. The number in chart is the son. The son Raffi answered but stated he has no information and no clue as to her where about's. He states he is no longer in charge of anything concerning the pt and has not been for the past 3 months and he keeps telling everyone that . He said the pt has a home phone number but he did not know it right off hand and will try to find it and call back. I have taken the liberty to remove his contact information. Can you please get updated contact information from the pt while she is there.

## 2023-07-21 NOTE — PROGRESS NOTES
2000 ml net uf removed   07/21/23 1250   Required for all Hemodialysis Patients   Hepatitis Status negative   Handoff Report   Received From Uma   Given To Layla   Treatment Type   Treatment Type Maintenance   Vital Signs   Temp 97.8 °F (36.6 °C)   Pulse (!) 53   Resp 18   SpO2 97 %   BP (!) 127/51   Assessments (Pre/Post)   Safety vein preservation armband present   Date Hepatitis Profile Obtained 03/06/23   Blood Liters Processed (BLP) 50.8   Transport Modality not applicable   Level of Consciousness (AVPU) alert   Dialyzer Clearance mildly streaked   Pain   Preferred Pain Scale number (Numeric Rating Pain Scale)   Pain Rating (0-10): Rest 0        Hemodialysis AV Fistula Right upper arm   No Placement Date or Time found.   Present Prior to Hospital Arrival?: Yes  Location: Right upper arm   Site Assessment Clean;Dry;Intact   Patency Present;Thrill;Bruit   Status Deaccessed   Flows Good   Dressing Status Clean;Dry;Intact   Site Condition No complications   Dressing Gauze   Post-Hemodialysis Assessment   Rinseback Volume (mL) 250 mL   Blood Volume Processed (Liters) 50.8 L   Dialyzer Clearance Lightly streaked   Duration of Treatment 180 minutes   Additional Fluid Intake (mL) 500 mL   Total UF (mL) 2500 mL   Net Fluid Removal 2000   Patient Response to Treatment tolerated well   Post-Treatment Weight 49.5 kg (109 lb 2 oz)   Treatment Weight Change -2   Arterial bleeding stop time (min) 5 min   Venous bleeding stop time (min) 5 min   Post-Hemodialysis Comments tx completed   Edema   Edema generalized     Educated about fluid control

## 2023-07-21 NOTE — NURSING
0700: report received, in bed with no distress  0930: off floor to dialysis  1500: went over all discharge instructions and answered all questions, removed cardiac monitor and placed with , removed saline lock waiting on cab.

## 2023-07-21 NOTE — PROGRESS NOTES
INPATIENT NEPHROLOGY Progress Note  [facility]    Patient Name: Tyra Isaac  Date: 07/21/2023    Reason for consultation: ESRD    Chief Complaint:   Chief Complaint   Patient presents with    Weakness     Dialysis pt presents to ed complaining of generalized weakness since dialysis yesterday. Fistula was bleeding heavily according to family members. Pt has been complaining of severe weakness since. Unsure how much fluid was taken off.        History of Present Illness:   Patient is a 83-year-old female with history of ESRD on HD Monday Wednesday Friday and AFib on anticoagulation who presents with generalized weakness and bleeding from her right upper extremity fistula site.  Patient had routine HD yesterday and developed bleeding for fissure slight delay home.  Per patient, it was a lot of blood.  The bleeding later stopped.  Patient is on warfarin for AFib.  Patient had another episode of bleeding today.  EMS was called patient's house as she felt generally weak.  Patient was noted to be hypotensive was given a small fluid bolus with improvement in blood pressure.  In ED, there is no further evidence of bleeding.  Hemoglobin 9.6 which is near her baseline.  PT greater than 100 and INR unable to calculate. Consulted for dialysis.     Interval History:  7/19- she says she thinks she was getting coumadin twice per day for the last week- she noticed it was different- she says the nurse who filled her weekly box said she was following orders- will need assistance from VICENTE/KATY to contact nurse and find out what orders she was following  7/20- once INR down, patient will be switched to eliquis  7/21- seen on HD- eliquis initiated     Plan of Care:    Assessment:  ESRD on HD MWF  HTN  SHPT  Anemia of CKD  Bleeding due to supratherapeutic INR- hx of AF on AC    Plan:    - continue HD MWF  - UF 2L  - continue binder with meals  - no acute transfusion needs- continue BRAYAN with HD  - switched from  coumadin to eliquis     Thank you for allowing us to participate in this patient's care. We will continue to follow.    Vital Signs:  Temp Readings from Last 3 Encounters:   07/21/23 98.1 °F (36.7 °C) (Oral)   06/29/23 98.2 °F (36.8 °C) (Oral)   04/01/23 97.3 °F (36.3 °C) (Oral)       Pulse Readings from Last 3 Encounters:   07/21/23 (!) 54   06/29/23 82   04/01/23 (!) 51       BP Readings from Last 3 Encounters:   07/21/23 (!) 149/56   06/29/23 (!) 164/73   04/01/23 (!) 119/50       Weight:  Wt Readings from Last 3 Encounters:   07/18/23 51.5 kg (113 lb 9.6 oz)   06/28/23 49.9 kg (110 lb)   03/31/23 51.3 kg (113 lb)     Medications:  Scheduled Meds:   apixaban  2.5 mg Oral BID    atorvastatin  40 mg Oral Daily    calcium acetate(phosphat bind)  667 mg Oral Daily    diltiaZEM  120 mg Oral Daily    epoetin rosy (PROCRIT) injection  100 Units/kg Subcutaneous Every Mon, Wed, Fri    epoetin rosy-ebpx (RETACRIT) injection  100 Units/kg Intravenous Every Mon, Wed, Fri    metoprolol tartrate  25 mg Oral BID    mupirocin   Nasal BID    sodium chloride 0.9%  10 mL Intravenous Q12H     Continuous Infusions:  PRN Meds:.acetaminophen, ondansetron  No current facility-administered medications on file prior to encounter.     Current Outpatient Medications on File Prior to Encounter   Medication Sig Dispense Refill    atorvastatin (LIPITOR) 40 MG tablet Take 40 mg by mouth once daily.      b complex vitamins tablet Take 1 tablet by mouth once daily.      calcium acetate,phosphat bind, (PHOSLO) 667 mg tablet Take 667 mg by mouth 2 (two) times daily with meals.      diltiaZEM (CARDIZEM CD) 120 MG Cp24 Take 1 capsule (120 mg total) by mouth 2 (two) times a day.      docusate sodium (COLACE) 100 MG capsule Take 100 mg by mouth once daily.      dorzolamide-timolol 2-0.5% (COSOPT) 22.3-6.8 mg/mL ophthalmic solution Place 1 drop into both eyes 2 (two) times daily.      latanoprost 0.005 % ophthalmic solution Place 1 drop into both eyes  every evening.      metoprolol tartrate (LOPRESSOR) 25 MG tablet Take 1 tablet (25 mg total) by mouth 2 (two) times daily. 60 tablet 11    [DISCONTINUED] warfarin (COUMADIN) 3 MG tablet Take 3 mg by mouth every Mon, Wed, Fri.      [DISCONTINUED] warfarin (COUMADIN) 3 MG tablet Take 1.5 mg by mouth every Tuesday, Thursday, Saturday, Sunday.      ELIQUIS 2.5 mg Tab Take 2.5 mg by mouth 2 (two) times daily. NEW Rx - NOT YET STARTED         Review of Systems:  Neg    Physical Exam:  General Appearance:    NAD, AAO x 3, cooperative, appears stated age   Head:    Normocephalic, atraumatic   Eyes:    PER, EOMI, and conjunctiva/sclera clear bilaterally       Mouth:   Moist mucus membranes, no thrush or oral lesions,       normal dentition   Back:     No CVA tenderness   Lungs:     Clear to auscultation bilaterally, no wheezes, crackles,           rales or rhonchi, symmetric air movement, respirations unlabored   Chest wall:    No tenderness or deformity   Heart:    Regular rate and rhythm, S1 and S2 normal, no murmur, rub   or gallop   Abdomen:     Soft, non-tender, non-distended, bowel sounds active all four   quadrants, no RT or guarding, no masses, no organomegaly   Extremities:   Warm and well perfused, distal pulses are intact, no             cyanosis or peripheral edema   MSK:   No joint or muscle swelling, tenderness or deformity   Skin:   Skin color, texture, turgor normal, no rashes or lesions   Neurologic/Psychiatric:   CNII-XII intact, normal strength and sensation       throughout, no asterixis; normal affect, memory, judgement     and insight      Results:  Lab Results   Component Value Date     07/21/2023    K 4.8 07/21/2023     07/21/2023    CO2 21 (L) 07/21/2023    BUN 35 (H) 07/21/2023    CREATININE 8.6 (H) 07/21/2023    CALCIUM 9.0 07/21/2023    ANIONGAP 8 07/21/2023    ESTGFRAFRICA 4.7 (A) 02/08/2022    EGFRNONAA 4.0 (A) 02/08/2022       Lab Results   Component Value Date    CALCIUM 9.0  07/21/2023    PHOS 3.2 06/29/2023       Recent Labs   Lab 07/21/23  0513   WBC 7.05   RBC 3.29*   HGB 10.2*   HCT 32.9*      *   MCH 31.0   MCHC 31.0*         I have personally reviewed pertinent radiological imaging and reports.    I have spent > 35 minutes providing care for this patient for the above diagnoses. These services have included chart/data/imaging review, evaluation, exam, formulation of plan, , note preparation, and discussions with staff involved in this patient's care.    Rose Maynard MD MPH  [facility] [place]  [address] [place] [id number]  [phone] (p)  [phone] (f)

## 2023-07-21 NOTE — PLAN OF CARE
Sent HH order and clinicals to [facility] via careport.  Plan DC today.     07/21/23 0853   Post-Acute Status   Post-Acute Authorization Home Health   Home Health Status Referrals Sent   Discharge Plan   Discharge Plan A Home Health   Discharge Plan B Home Health

## 2023-07-21 NOTE — PLAN OF CARE
07/21/23 1320   Final Note   Assessment Type Final Discharge Note   Anticipated Discharge Disposition Home-Health   What phone number can be called within the next 1-3 days to see how you are doing after discharge? [phone]   Hospital Resources/Appts/Education Provided Post-Acute resouces added to AVS;Appointments scheduled by Navigator/Coordinator   Post-Acute Status   Post-Acute Authorization Home Health   Home Health Status Set-up Complete/Auth obtained   Other Status Awaiting f/u Appts   Discharge Delays (!) Procedure Scheduling (IR, OR, Labs, Echo, Cath, Echo, EEG)     Discharge orders and chart reviewed. No other discharge needs noted at this time. Pt is clear for discharge from case management. Pt is discharging to home with resumption of [facility].    Inbasket message sent to PCP office to notify of discharge- office to contact patient with hospital follow up appt date/time.    Patient requesting cab upon discharge- primary nurse to arrange.      1355 - approval for cab received from Jayro (unit director)

## 2023-07-21 NOTE — PLAN OF CARE
07/21/23 0828   Post-Acute Status   Post-Acute Authorization Medications     Case Management attempted to call [facility] pharmacy regarding Eliquis prescription- pharmacy currently closed

## 2023-07-21 NOTE — PLAN OF CARE
07/21/23 0832   Post-Acute Status   Post-Acute Authorization Other   Other Status Awaiting f/u Appts     PCP office notified via Inbasket of discharge to home on today; hospital follow up appt requested

## 2023-07-21 NOTE — TELEPHONE ENCOUNTER
----- Message from Jael Kimble RN sent at 7/21/2023  8:28 AM CDT -----  Good Morning    Patient admitted to [facility] for supra-therapeutic INR. Patient discharging to home today with home health. Coumadin has been switched to PO eliquis. Per hospitalist, patient will need to follow up within two weeks of hospital discharge. Please schedule hospital follow up and contact patient with appt information.     Thank you  Jael Kimble RN CM

## 2023-07-21 NOTE — DISCHARGE SUMMARY
[facility] Medicine  Discharge Summary      Patient Name: Tyra Isaac  MRN: [id number]  Banner: [id number]  Patient Class: IP- Inpatient  Admission Date: 7/18/2023  Hospital Length of Stay: 2 days  Discharge Date and Time: 7/21/2023  3:30 PM  Attending Physician: Indu Del Valle MD   Discharging Provider: Maryjane Hua NP  Primary Care Provider: Kalpesh Goel MD    Primary Care Team: Networked reference to record PCT     HPI:   Patient is a 83-year-old female with history of ESRD on HD Monday Wednesday Friday and AFib on anticoagulation who presents with generalized weakness and bleeding from her right upper extremity fistula site.  Patient had routine HD yesterday and developed bleeding for fissure slight delay home.  Per patient, it was a lot of blood.  The bleeding later stopped.  Patient is on warfarin for AFib.  Patient had another episode of bleeding today.  EMS was called patient's house as she felt generally weak.  Patient was noted to be hypotensive was given a small fluid bolus with improvement in blood pressure.  In ED, there is no further evidence of bleeding.  Hemoglobin 9.6 which is near her baseline.  PT greater than 100 and INR unable to calculate.  Repeat PT INR pending.      * No surgery found *      Hospital Course:   Patient was admitted with Coumadin toxicity.  She was monitored closely during her stay.  Her labs and vital signs were trended closely.  Nephrology was consulted for routine dialysis.  She was given oral dose of vitamin K. Her Coumadin was held and her PT and INR fell to near normal levels quickly.  She was transition to oral apixaban.  Patient was seen and evaluated on day of discharge and deemed appropriate.  Patient was counseled to stop taking Coumadin.  Discharge instructions as well as return precautions were discussed with patient with good understanding.       Goals of Care Treatment Preferences:  Code Status: Full Code      Consults:    Consults (From admission, onward)          Status Ordering Provider     IP consult to case management  Once        Provider:  (Not yet assigned)    Completed FARIDA FENTON     Inpatient consult to Nephrology  Once        Provider:  Rose Maynard MD    Completed FARIDA FENTON     Inpatient consult to Hospitalist  Once        Provider:  Farida Fenton MD    Acknowledged RUMA DILLAN            No new Assessment & Plan notes have been filed under this hospital service since the last note was generated.  Service: Hospital Medicine    Final Active Diagnoses:    Diagnosis Date Noted POA    PRINCIPAL PROBLEM:  Supratherapeutic INR [R79.1] 07/18/2023 Yes    Atrial fibrillation [I48.91] 12/09/2022 Yes    Anemia of chronic disease [D63.8] 01/27/2022 Yes     Chronic    ESRD on HD via are right upper extremity AVF MWF [N18.6] 12/13/2019 Yes     Chronic      Problems Resolved During this Admission:       Discharged Condition: good    Disposition: Home-Health Care Svc    Follow Up:   Follow-up Information       Kalpesh Goel MD Follow up in 2 week(s).    Specialty: Internal Medicine  Contact information:  [address] [id number] [phone]               [facility] Follow up.    Specialty: Home Health Services  Why: Home Health  Contact information:  [address] [address] [phone]                           Patient Instructions:      Ambulatory referral/consult to [facility]   Standing Status: Future   Referral Priority: Urgent Referral Type: Consultation   Referral Reason: Specialty Services Required   Number of Visits Requested: 1     Ambulatory referral/consult to Home Health   Standing Status: Future   Referral Priority: Routine Referral Type: Home Health   Referral Reason: Specialty Services Required   Requested Specialty: Home Health Services   Number of Visits Requested: 1     Diet renal     Notify your health care provider if you experience any of the  following:  increased confusion or weakness     Notify your health care provider if you experience any of the following:  persistent dizziness, light-headedness, or visual disturbances     Activity as tolerated       Significant Diagnostic Studies: Labs:   CMP   Recent Labs   Lab 07/20/23 0453 07/21/23 0513    137   K 5.0 4.8    108   CO2 22* 21*   GLU 81 101   BUN 25* 35*   CREATININE 6.4* 8.6*   CALCIUM 9.0 9.0   ANIONGAP 9 8   , CBC   Recent Labs   Lab 07/20/23 0452 07/21/23 0513   WBC 7.10 7.05   HGB 9.9* 10.2*   HCT 32.0* 32.9*    275    and INR   Lab Results   Component Value Date    INR 1.5 (H) 07/21/2023    INR 1.6 (H) 07/20/2023    INR 9.7 (HH) 07/19/2023     Radiology: X-Ray: CXR:   X-Ray Chest AP Portable [[id number]] Collected: 07/18/23 1601   Order Status: Completed Updated: 07/18/23 8349   Narrative:     Reason: CHF     FINDINGS:     Portable chest with comparison chest x-ray June 28, 2023 show normal cardiomediastinal silhouette.   Lungs are clear. Pulmonary vasculature is normal. No acute osseous abnormality.     IMPRESSION:     No acute cardiopulmonary abnormality.     Electronically signed by:  Mike Clayton DO  7/18/2023 4:55 PM CDT Workstation: [id number]         Pending Diagnostic Studies:       None           Medications:  Reconciled Home Medications:      Medication List        CONTINUE taking these medications      atorvastatin 40 MG tablet  Commonly known as: LIPITOR  Take 40 mg by mouth once daily.     b complex vitamins tablet  Take 1 tablet by mouth once daily.     calcium acetate(phosphat bind) 667 mg tablet  Commonly known as: PHOSLO  Take 667 mg by mouth 2 (two) times daily with meals.     diltiaZEM 120 MG Cp24  Commonly known as: CARDIZEM CD  Take 1 capsule (120 mg total) by mouth 2 (two) times a day.     docusate sodium 100 MG capsule  Commonly known as: COLACE  Take 100 mg by mouth once daily.     dorzolamide-timolol 2-0.5% 22.3-6.8 mg/mL ophthalmic  solution  Commonly known as: COSOPT  Place 1 drop into both eyes 2 (two) times daily.     ELIQUIS 2.5 mg Tab  Generic drug: apixaban  Take 2.5 mg by mouth 2 (two) times daily. NEW Rx - NOT YET STARTED     latanoprost 0.005 % ophthalmic solution  Place 1 drop into both eyes every evening.     metoprolol tartrate 25 MG tablet  Commonly known as: LOPRESSOR  Take 1 tablet (25 mg total) by mouth 2 (two) times daily.            STOP taking these medications      warfarin 3 MG tablet  Commonly known as: COUMADIN              Indwelling Lines/Drains at time of discharge:   Lines/Drains/Airways       Drain  Duration                  Hemodialysis AV Fistula Right upper arm -- days                    Time spent on the discharge of patient: 32 minutes         Maryjane Hua NP  Department of Hospital Medicine  Critical access hospital

## 2023-07-22 ENCOUNTER — HOSPITAL ENCOUNTER (EMERGENCY)
Facility: HOSPITAL | Age: 83
Discharge: HOME | End: 2023-07-22
Attending: EMERGENCY MEDICINE
Payer: MEDICARE

## 2023-07-22 VITALS
OXYGEN SATURATION: 100 % | SYSTOLIC BLOOD PRESSURE: 130 MMHG | RESPIRATION RATE: 18 BRPM | DIASTOLIC BLOOD PRESSURE: 68 MMHG | WEIGHT: 115 LBS | HEART RATE: 68 BPM | BODY MASS INDEX: 19.16 KG/M2 | HEIGHT: 65 IN | TEMPERATURE: 98 F

## 2023-07-22 DIAGNOSIS — R11.2 NAUSEA AND VOMITING, UNSPECIFIED VOMITING TYPE: Primary | ICD-10-CM

## 2023-07-22 DIAGNOSIS — R11.10 VOMITING: ICD-10-CM

## 2023-07-22 LAB
ALBUMIN SERPL BCP-MCNC: 3.8 G/DL (ref 3.5–5.2)
ALP SERPL-CCNC: 63 U/L (ref 55–135)
ALT SERPL W/O P-5'-P-CCNC: 33 U/L (ref 10–44)
ANION GAP SERPL CALC-SCNC: 13 MMOL/L (ref 8–16)
AST SERPL-CCNC: 19 U/L (ref 10–40)
BASOPHILS # BLD AUTO: 0.05 K/UL (ref 0–0.2)
BASOPHILS NFR BLD: 0.6 % (ref 0–1.9)
BILIRUB SERPL-MCNC: 0.5 MG/DL (ref 0.1–1)
BUN SERPL-MCNC: 28 MG/DL (ref 8–23)
CALCIUM SERPL-MCNC: 9 MG/DL (ref 8.7–10.5)
CHLORIDE SERPL-SCNC: 96 MMOL/L (ref 95–110)
CO2 SERPL-SCNC: 28 MMOL/L (ref 23–29)
CREAT SERPL-MCNC: 7.5 MG/DL (ref 0.5–1.4)
DIFFERENTIAL METHOD: ABNORMAL
EOSINOPHIL # BLD AUTO: 0.7 K/UL (ref 0–0.5)
EOSINOPHIL NFR BLD: 7.6 % (ref 0–8)
ERYTHROCYTE [DISTWIDTH] IN BLOOD BY AUTOMATED COUNT: 14.4 % (ref 11.5–14.5)
EST. GFR  (NO RACE VARIABLE): 5 ML/MIN/1.73 M^2
GLUCOSE SERPL-MCNC: 134 MG/DL (ref 70–110)
HCT VFR BLD AUTO: 39.6 % (ref 37–48.5)
HGB BLD-MCNC: 12.3 G/DL (ref 12–16)
IMM GRANULOCYTES # BLD AUTO: 0.05 K/UL (ref 0–0.04)
IMM GRANULOCYTES NFR BLD AUTO: 0.6 % (ref 0–0.5)
LIPASE SERPL-CCNC: 38 U/L (ref 4–60)
LYMPHOCYTES # BLD AUTO: 1.1 K/UL (ref 1–4.8)
LYMPHOCYTES NFR BLD: 13.1 % (ref 18–48)
MCH RBC QN AUTO: 31 PG (ref 27–31)
MCHC RBC AUTO-ENTMCNC: 31.1 G/DL (ref 32–36)
MCV RBC AUTO: 100 FL (ref 82–98)
MONOCYTES # BLD AUTO: 1 K/UL (ref 0.3–1)
MONOCYTES NFR BLD: 12.2 % (ref 4–15)
NEUTROPHILS # BLD AUTO: 5.6 K/UL (ref 1.8–7.7)
NEUTROPHILS NFR BLD: 65.9 % (ref 38–73)
NRBC BLD-RTO: 0 /100 WBC
PLATELET # BLD AUTO: 321 K/UL (ref 150–450)
PMV BLD AUTO: 9.7 FL (ref 9.2–12.9)
POTASSIUM SERPL-SCNC: 4.5 MMOL/L (ref 3.5–5.1)
PROT SERPL-MCNC: 8 G/DL (ref 6–8.4)
RBC # BLD AUTO: 3.97 M/UL (ref 4–5.4)
SODIUM SERPL-SCNC: 137 MMOL/L (ref 136–145)
TROPONIN I SERPL HS-MCNC: 17.9 PG/ML (ref 0–14.9)
TROPONIN I SERPL HS-MCNC: 19.9 PG/ML (ref 0–14.9)
WBC # BLD AUTO: 8.55 K/UL (ref 3.9–12.7)

## 2023-07-22 PROCEDURE — 80053 COMPREHEN METABOLIC PANEL: CPT | Performed by: EMERGENCY MEDICINE

## 2023-07-22 PROCEDURE — 84484 ASSAY OF TROPONIN QUANT: CPT | Performed by: EMERGENCY MEDICINE

## 2023-07-22 PROCEDURE — 63600175 PHARM REV CODE 636 W HCPCS: Performed by: EMERGENCY MEDICINE

## 2023-07-22 PROCEDURE — 96361 HYDRATE IV INFUSION ADD-ON: CPT

## 2023-07-22 PROCEDURE — 93010 EKG 12-LEAD: ICD-10-PCS | Mod: ,,, | Performed by: SPECIALIST

## 2023-07-22 PROCEDURE — 93005 ELECTROCARDIOGRAM TRACING: CPT | Performed by: SPECIALIST

## 2023-07-22 PROCEDURE — 93010 ELECTROCARDIOGRAM REPORT: CPT | Mod: ,,, | Performed by: SPECIALIST

## 2023-07-22 PROCEDURE — 25000003 PHARM REV CODE 250: Performed by: EMERGENCY MEDICINE

## 2023-07-22 PROCEDURE — 85025 COMPLETE CBC W/AUTO DIFF WBC: CPT | Performed by: EMERGENCY MEDICINE

## 2023-07-22 PROCEDURE — 96374 THER/PROPH/DIAG INJ IV PUSH: CPT

## 2023-07-22 PROCEDURE — 99284 EMERGENCY DEPT VISIT MOD MDM: CPT | Mod: 25

## 2023-07-22 PROCEDURE — 83690 ASSAY OF LIPASE: CPT | Performed by: EMERGENCY MEDICINE

## 2023-07-22 RX ORDER — ONDANSETRON 4 MG/1
4 TABLET, FILM COATED ORAL EVERY 8 HOURS PRN
Qty: 15 TABLET | Refills: 0 | Status: SHIPPED | OUTPATIENT
Start: 2023-07-22 | End: 2024-03-15

## 2023-07-22 RX ORDER — ONDANSETRON 2 MG/ML
4 INJECTION INTRAMUSCULAR; INTRAVENOUS
Status: COMPLETED | OUTPATIENT
Start: 2023-07-22 | End: 2023-07-22

## 2023-07-22 RX ADMIN — ONDANSETRON 4 MG: 2 INJECTION INTRAMUSCULAR; INTRAVENOUS at 01:07

## 2023-07-22 RX ADMIN — SODIUM CHLORIDE 250 ML: 0.9 INJECTION, SOLUTION INTRAVENOUS at 01:07

## 2023-07-22 NOTE — ED PROVIDER NOTES
Encounter Date: 7/22/2023       History     Chief Complaint   Patient presents with    Vomiting     Pt was shopping at [employer] when she started to feel bad. She was with her nephew so they sat down for approx. 45 minutes then she started to vomit. [employer] employee called [id number]-[phone].      83-year-old female past medical history of end-stage renal disease on hemodialysis, hypertension, hyperlipidemia, anxiety, depression, AFib, presents emergency department with nausea, vomiting diarrhea.  Patient says that she was just discharged yesterday from the hospital.  She says that she was shopping with her nephew at "[email or web address] when she started to feel bad she sat down in she started to vomit.  Patient has had several episodes of nonbloody nonbilious emesis.  Patient has had some loose stool as well.  Abdominal cramps but no pain.  No chest pain or shortness of breath.  She says that she has not taken anything prior to arrival.  She received her dialysis yesterday in the hospital.  She goes Monday Wednesday Friday.    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    Osteopenia of multiple sites 3/9/2018    Based upon bone density measurements. Patient also has chronic kidney disease.    Stroke 11/2016     Past Surgical History:   Procedure Laterality Date    ANGIOGRAM, CORONARY, WITH LEFT HEART CATHETERIZATION N/A 2/7/2022     Procedure: Angiogram, Coronary, with Left Heart Cath;  Surgeon: Gino Leal MD;  Location: [facility] CATH[facility]EP LAB;  Service: Cardiology;  Laterality: N/A;    CARDIAC SURGERY      stents    EYE SURGERY      WRIST SURGERY       Family History   Problem Relation Age of Onset    Heart disease Mother     Cancer Father      Social History     Tobacco Use    Smoking status: Never    Smokeless tobacco: Never   Substance Use Topics    Alcohol use: No    Drug use: No     Review of Systems   Constitutional:  Negative for chills and fever.   HENT:  Negative for sore throat.    Respiratory:  Negative for shortness of breath.    Cardiovascular:  Negative for chest pain and palpitations.   Gastrointestinal:  Positive for nausea and vomiting. Negative for abdominal pain and diarrhea.   Genitourinary:  Negative for dysuria and flank pain.   Musculoskeletal:  Negative for back pain.   Skin:  Negative for rash.   Neurological:  Negative for speech difficulty, weakness and headaches.   Hematological:  Does not bruise/bleed easily.   All other systems reviewed and are negative.    Physical Exam     Initial Vitals [07/22/23 1302]   BP Pulse Resp Temp SpO2   134/60 64 18 98 °F (36.7 °C) 100 %      MAP       --         Physical Exam    Nursing note and vitals reviewed.  Constitutional: She appears well-developed and well-nourished.   HENT:   Head: Normocephalic and atraumatic.   Mouth/Throat: No oropharyngeal exudate.   Dry mucous membranes   Eyes: Conjunctivae and EOM are normal. Pupils are equal, round, and reactive to light.   Neck: Neck supple. No tracheal deviation present.   Normal range of motion.  Cardiovascular:  Normal rate, regular rhythm, normal heart sounds and intact distal pulses.           No murmur heard.  Pulmonary/Chest: Breath sounds normal. No stridor. No respiratory distress. She has no wheezes. She has no rhonchi. She has no rales.   Abdominal: Abdomen is soft. She exhibits no distension. There is no abdominal  tenderness. There is no rebound.   Musculoskeletal:         General: No tenderness or edema. Normal range of motion.      Cervical back: Normal range of motion and neck supple.      Comments: Right upper arm:  There is a dialysis fistula present with a palpable thrill and bruit.     Neurological: She is alert and oriented to person, place, and time. She has normal strength. No cranial nerve deficit or sensory deficit. GCS score is 15. GCS eye subscore is 4. GCS verbal subscore is 5. GCS motor subscore is 6.   Skin: Skin is warm and dry. Capillary refill takes less than 2 seconds. No rash noted. No erythema. No pallor.   Psychiatric: She has a normal mood and affect. Thought content normal.       ED Course   Procedures  Labs Reviewed   CBC W/ AUTO DIFFERENTIAL - Abnormal; Notable for the following components:       Result Value    RBC 3.97 (*)      (*)     MCHC 31.1 (*)     Immature Granulocytes 0.6 (*)     Immature Grans (Abs) 0.05 (*)     Eos # 0.7 (*)     Lymph % 13.1 (*)     All other components within normal limits   COMPREHENSIVE METABOLIC PANEL - Abnormal; Notable for the following components:    Glucose 134 (*)     BUN 28 (*)     Creatinine 7.5 (*)     eGFR 5.0 (*)     All other components within normal limits   TROPONIN I HIGH SENSITIVITY - Abnormal; Notable for the following components:    Troponin I High Sensitivity 19.9 (*)     All other components within normal limits   TROPONIN I HIGH SENSITIVITY - Abnormal; Notable for the following components:    Troponin I High Sensitivity 17.9 (*)     All other components within normal limits   LIPASE        ECG Results              EKG 12-lead (In process)  Result time 07/22/23 15:28:32      In process by Interface, Lab In [facility] (07/22/23 15:28:32)                   Narrative:    Test Reason : R11.10,    Vent. Rate : 058 BPM     Atrial Rate : 058 BPM     P-R Int : 158 ms          QRS Dur : 072 ms      QT Int : 504 ms       P-R-T Axes : 086 026 032  degrees     QTc Int : 494 ms    Sinus bradycardia with sinus arrhythmia  Septal infarct (cited on or before 22-JUL-2023)  Abnormal ECG  When compared with ECG of 22-JUL-2023 14:13,  No significant change was found    Referred By: [email or web address]   SELF           Confirmed By:                                   Results for orders placed or performed during the hospital encounter of 07/22/23   CBC auto differential   Result Value Ref Range    WBC 8.55 3.90 - 12.70 K/uL    RBC 3.97 (L) 4.00 - 5.40 M/uL    Hemoglobin 12.3 12.0 - 16.0 g/dL    Hematocrit 39.6 37.0 - 48.5 %     (H) 82 - 98 fL    MCH 31.0 27.0 - 31.0 pg    MCHC 31.1 (L) 32.0 - 36.0 g/dL    RDW 14.4 11.5 - 14.5 %    Platelets 321 150 - 450 K/uL    MPV 9.7 9.2 - 12.9 fL    Immature Granulocytes 0.6 (H) 0.0 - 0.5 %    Gran # (ANC) 5.6 1.8 - 7.7 K/uL    Immature Grans (Abs) 0.05 (H) 0.00 - 0.04 K/uL    Lymph # 1.1 1.0 - 4.8 K/uL    Mono # 1.0 0.3 - 1.0 K/uL    Eos # 0.7 (H) 0.0 - 0.5 K/uL    Baso # 0.05 0.00 - 0.20 K/uL    nRBC 0 0 /100 WBC    Gran % 65.9 38.0 - 73.0 %    Lymph % 13.1 (L) 18.0 - 48.0 %    Mono % 12.2 4.0 - 15.0 %    Eosinophil % 7.6 0.0 - 8.0 %    Basophil % 0.6 0.0 - 1.9 %    Differential Method Automated    Comprehensive metabolic panel   Result Value Ref Range    Sodium 137 136 - 145 mmol/L    Potassium 4.5 3.5 - 5.1 mmol/L    Chloride 96 95 - 110 mmol/L    CO2 28 23 - 29 mmol/L    Glucose 134 (H) 70 - 110 mg/dL    BUN 28 (H) 8 - 23 mg/dL    Creatinine 7.5 (H) 0.5 - 1.4 mg/dL    Calcium 9.0 8.7 - 10.5 mg/dL    Total Protein 8.0 6.0 - 8.4 g/dL    Albumin 3.8 3.5 - 5.2 g/dL    Total Bilirubin 0.5 0.1 - 1.0 mg/dL    Alkaline Phosphatase 63 55 - 135 U/L    AST 19 10 - 40 U/L    ALT 33 10 - 44 U/L    eGFR 5.0 (A) >60 mL/min/1.73 m^2    Anion Gap 13 8 - 16 mmol/L   Lipase   Result Value Ref Range    Lipase 38 4 - 60 U/L   Troponin I High Sensitivity   Result Value Ref Range    Troponin I High Sensitivity 19.9 (H) 0.0 - 14.9 pg/mL   TROPONIN I HIGH  SENSITIVITY   Result Value Ref Range    Troponin I High Sensitivity 17.9 (H) 0.0 - 14.9 pg/mL       Imaging Results    None          Medications   sodium chloride 0.9% bolus 250 mL 250 mL (0 mLs Intravenous Stopped 7/22/23 1447)   ondansetron injection 4 mg (4 mg Intravenous Given 7/22/23 1342)     Medical Decision Making:   Differential Diagnosis:   Includes but not limited to vomiting, diarrhea, dehydration, electrolyte abnormality, viral illness, less suspicious for acute coronary syndrome, pancreatitis, urinary tract infection,  Clinical Tests:   Lab Tests: Ordered and Reviewed  Radiological Study: Ordered and Reviewed  Medical Tests: Ordered and Reviewed  ED Management:  Emergent evaluation of 83-year-old female presents emergency department above-mentioned complaints.  Patient seen in the ER and evaluated and has had labs which include a troponin which is around the patient's baseline x 2.  Lipase is normal.  Electrolytes are within normal limits.  CBC relatively within normal limits.  Patient given 250 cc of fluids in the emergency department.  She is tolerating p.o. eating crackers and peanut butter.  Is feeling better.  Likely has a viral type illness.  She is not have any abdominal tenderness to palpation doubt obstruction doubt volvulus, doubt any acute abdominal surgical pathology.  She will be discharged home to follow up with primary care provider.  She will return if her symptoms change or worsen.    A dictation software program was used for this note.  Please expect some simple typographical  errors in this note.            ED Course as of 07/22/23 2115   Sat Jul 22, 2023   1421 EKG 2:16 p.m. sinus bradycardia rate of 58, sinus arrhythmia.  No ST elevation or depression.  No STEMI.  EKG interpreted independently by me. [JR]   1711 Is reported that the patient is tolerating p.o. peanut butter crackers and no vomiting.  Is feeling better.  Plan to discharge home.  Troponin is stable this is likely  elevated minimally in the setting of CKD. I do not suspect ACS [JR]      ED Course User Index  [JR] Brando Sutton DO                 Clinical Impression:   Final diagnoses:  [R11.10] Vomiting  [R11.2] Nausea and vomiting, unspecified vomiting type (Primary)        ED Disposition Condition    Discharge Stable          ED Prescriptions       Medication Sig Dispense Start Date End Date Auth. Provider    ondansetron (ZOFRAN) 4 MG tablet Take 1 tablet (4 mg total) by mouth every 8 (eight) hours as needed for Nausea. 15 tablet 7/22/2023 -- Brando Sutton DO          Follow-up Information       Follow up With Specialties Details Why Contact Info Additional Information    Kalpesh Goel MD Internal Medicine In 3 days  [address] [address]  [address]  [facility] [address]  [phone]       [facility] - Emergency Dept Emergency Medicine  If symptoms worsen [address] [facility] [address]  [phone] 1st floor             Brando Sutton DO  07/22/23 7664

## 2023-07-24 ENCOUNTER — PATIENT OUTREACH (OUTPATIENT)
Dept: ADMINISTRATIVE | Facility: CLINIC | Age: 83
End: 2023-07-24
Payer: MEDICARE

## 2023-07-24 ENCOUNTER — PES CALL (OUTPATIENT)
Dept: ADMINISTRATIVE | Facility: CLINIC | Age: 83
End: 2023-07-24
Payer: MEDICARE

## 2023-07-24 NOTE — PROGRESS NOTES
C3 nurse attempted to contact Tyra Isaac  for a TCC post hospital discharge follow up call. No answer. No voicemail available.The patient does not have a scheduled HOSFU appointment. Message sent to PCP staff for assistance with scheduling visit with patient.

## 2023-07-25 ENCOUNTER — PES CALL (OUTPATIENT)
Dept: ADMINISTRATIVE | Facility: CLINIC | Age: 83
End: 2023-07-25
Payer: MEDICARE

## 2023-07-25 NOTE — PROGRESS NOTES
2nd Attempt made to reach patient for TCC call. Patient unable to complete tcc call at this time. Patient requests callback.

## 2023-07-27 ENCOUNTER — OFFICE VISIT (OUTPATIENT)
Dept: HOME HEALTH SERVICES | Facility: CLINIC | Age: 83
End: 2023-07-27
Payer: MEDICARE

## 2023-07-27 VITALS
SYSTOLIC BLOOD PRESSURE: 110 MMHG | HEART RATE: 55 BPM | RESPIRATION RATE: 18 BRPM | OXYGEN SATURATION: 97 % | DIASTOLIC BLOOD PRESSURE: 68 MMHG

## 2023-07-27 DIAGNOSIS — Z99.2 DEPENDENCE ON RENAL DIALYSIS: ICD-10-CM

## 2023-07-27 DIAGNOSIS — T45.514A POISONING BY WARFARIN SODIUM, UNDETERMINED INTENT, INITIAL ENCOUNTER: Primary | ICD-10-CM

## 2023-07-27 DIAGNOSIS — R19.7 DIARRHEA, UNSPECIFIED TYPE: ICD-10-CM

## 2023-07-27 PROBLEM — Z79.01 ANTICOAGULATED ON COUMADIN: Chronic | Status: RESOLVED | Noted: 2022-12-09 | Resolved: 2023-07-27

## 2023-07-27 PROCEDURE — 1160F PR REVIEW ALL MEDS BY PRESCRIBER/CLIN PHARMACIST DOCUMENTED: ICD-10-PCS | Mod: CPTII,S$GLB,,

## 2023-07-27 PROCEDURE — 3074F PR MOST RECENT SYSTOLIC BLOOD PRESSURE < 130 MM HG: ICD-10-PCS | Mod: CPTII,S$GLB,,

## 2023-07-27 PROCEDURE — 1160F RVW MEDS BY RX/DR IN RCRD: CPT | Mod: CPTII,S$GLB,,

## 2023-07-27 PROCEDURE — 3078F PR MOST RECENT DIASTOLIC BLOOD PRESSURE < 80 MM HG: ICD-10-PCS | Mod: CPTII,S$GLB,,

## 2023-07-27 PROCEDURE — 1159F MED LIST DOCD IN RCRD: CPT | Mod: CPTII,S$GLB,,

## 2023-07-27 PROCEDURE — 3078F DIAST BP <80 MM HG: CPT | Mod: CPTII,S$GLB,,

## 2023-07-27 PROCEDURE — 99496 TRANSJ CARE MGMT HIGH F2F 7D: CPT | Mod: S$GLB,,,

## 2023-07-27 PROCEDURE — 1111F DSCHRG MED/CURRENT MED MERGE: CPT | Mod: CPTII,S$GLB,,

## 2023-07-27 PROCEDURE — 99496 TRANSITIONAL CARE MANAGE SERVICE 7 DAY DISCHARGE: ICD-10-PCS | Mod: S$GLB,,,

## 2023-07-27 PROCEDURE — 1111F PR DISCHARGE MEDS RECONCILED W/ CURRENT OUTPATIENT MED LIST: ICD-10-PCS | Mod: CPTII,S$GLB,,

## 2023-07-27 PROCEDURE — 1159F PR MEDICATION LIST DOCUMENTED IN MEDICAL RECORD: ICD-10-PCS | Mod: CPTII,S$GLB,,

## 2023-07-27 PROCEDURE — 3074F SYST BP LT 130 MM HG: CPT | Mod: CPTII,S$GLB,,

## 2023-07-27 RX ORDER — SOTALOL HYDROCHLORIDE 80 MG/1
80 TABLET ORAL 2 TIMES DAILY
COMMUNITY
End: 2023-11-15 | Stop reason: SDUPTHER

## 2023-07-28 NOTE — PROGRESS NOTES
[facility]  Transition of Care Home Visit    Visit Date: 7/27/2023  Encounter Provider: Christelle LAMA-SHERIN  PCP:  Kalpesh Goel MD    PRESENTING HISTORY      Patient ID: Tyra Isaac is a 83 y.o. female.    Consult Requested By:  Dr. Indu Del Valle  Reason for Consult:  Hospital Follow Up    Tyra is being seen at home due to due to physical debility that presents a taxing effort to leave the home, to mitigate high risk of hospital readmission and/or due to the limited availability of reliable or safe options for transportation to the point of access to the provider. Prior to treatment on this visit the chart was reviewed and patient verbal consent was obtained. .      Chief Complaint: Transitional Care    History of Present Illness: Ms. Tyar Isaac is a 83 y.o. female who was recently admitted to the hospital.      Admission Date: 7/18/2023  Hospital Length of Stay: 2 days  Discharge Date and Time: 7/21/2023  3:30 PM  Hospital Diagnosis: Poisoning by warfarin sodium, undetermined intent, initial encounter [T45.514A]     HPI:   Patient is a 83-year-old female with history of ESRD on HD Monday Wednesday Friday and AFib on anticoagulation who presents with generalized weakness and bleeding from her right upper extremity fistula site.  Patient had routine HD yesterday and developed bleeding for fissure slight delay home.  Per patient, it was a lot of blood.  The bleeding later stopped.  Patient is on warfarin for AFib.  Patient had another episode of bleeding today.  EMS was called patient's house as she felt generally weak.  Patient was noted to be hypotensive was given a small fluid bolus with improvement in blood pressure.  In ED, there is no further evidence of bleeding.  Hemoglobin 9.6 which is near her baseline.  PT greater than 100 and INR unable to calculate.  Repeat PT INR pending.        Hospital Course:   Patient was admitted with Coumadin toxicity.  She was monitored closely  "during her stay.  Her labs and vital signs were trended closely.  Nephrology was consulted for routine dialysis.  She was given oral dose of vitamin K. Her Coumadin was held and her PT and INR fell to near normal levels quickly.  She was transition to oral apixaban.  Patient was seen and evaluated on day of discharge and deemed appropriate.  Patient was counseled to stop taking Coumadin.  Discharge instructions as well as return precautions were discussed with patient with good understanding.       ___________________________________________________________________    Today: Tyra Isaac is being seen in the home for a hospital follow up. See hospital course above for details.     HPI:  Greeted at the door by patient; visit conducted in living room. Patient reports has had diarrhea for 3 days now. No further bleeding for HD fistula. Has switched to Eliquis as directed. Uses rollator for ambulation, does not drive, and  nurse for medication management, otherwise appears independent for all ADLs. Uses taxi for most transportation. HD M-W-F. She is oriented x 3, but appears confused at times, with repetition of recent events and questions. She is easily reoriented. Home health nurse Lizeth present for half of visit.     Patient states is slowly improving since discharge. Reports diarrhea, denies taking anything OTC for relief. Reports sleeping "on and off" throughout the night. Appetite is poor, but improving each day.     VSS. All hospital discharge orders reviewed and being followed, all medications reconciled and reviewed, patient verbalized understanding. No other needs identified at this time. [facility] contact information provided to patient and left in home.      PCP is Kalpesh Goel MD. Plan to follow as needed to decrease risk of readmission.         Review of Systems   Constitutional:  Positive for appetite change. Negative for activity change and fever.   HENT: Negative.     Eyes: " Negative.    Respiratory:  Negative for cough and shortness of breath.    Cardiovascular:  Negative for chest pain and leg swelling.   Gastrointestinal:  Positive for diarrhea. Negative for abdominal pain, constipation and nausea.   Genitourinary:  Negative for difficulty urinating and hematuria.   Musculoskeletal:  Negative for joint swelling and neck stiffness.   Skin:  Negative for color change and wound.   Neurological:  Negative for dizziness, numbness and headaches.     Assessments:  Environmental: Lives alone in single story home.  Functional Status: Primarily independent. Has HH nurse managing medications right now. Does not drive.   Safety: Fall risk.   Nutritional: Adequate food in home. Poor appetite.   Home Health/DME/Supplies: [facility]. Rollator, shower chair.     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    Osteopenia of multiple sites 3/9/2018    Based upon bone density measurements. Patient also has chronic kidney disease.    Stroke 11/2016       Past Surgical History:   Procedure Laterality Date    ANGIOGRAM, CORONARY, WITH LEFT HEART CATHETERIZATION N/A 2/7/2022    Procedure: Angiogram, Coronary, with Left Heart Cath;  Surgeon: Gino Leal MD;  Location: [facility] CATH[facility]EP LAB;  Service: Cardiology;  Laterality: N/A;    CARDIAC SURGERY      stents    EYE SURGERY      WRIST SURGERY         Family History   Problem Relation Age of Onset    Heart disease Mother     Cancer Father        Social History     Socioeconomic History    Marital  status:      Spouse name: Aria Isaac    Number of children: 3   Occupational History    Occupation: Not working   Tobacco Use    Smoking status: Never    Smokeless tobacco: Never   Substance and Sexual Activity    Alcohol use: No    Drug use: No    Sexual activity: Not Currently     Social Determinants of Health     Transportation Needs: Unmet Transportation Needs    Lack of Transportation (Medical): Yes    Lack of Transportation (Non-Medical): No       MEDICATIONS & ALLERGIES:     Current Outpatient Medications on File Prior to Visit   Medication Sig Dispense Refill    atorvastatin (LIPITOR) 40 MG tablet Take 40 mg by mouth once daily.      b complex vitamins tablet Take 1 tablet by mouth once daily.      calcium acetate,phosphat bind, (PHOSLO) 667 mg tablet Take 667 mg by mouth 2 (two) times daily with meals.      diltiaZEM (CARDIZEM CD) 120 MG Cp24 Take 1 capsule (120 mg total) by mouth 2 (two) times a day.      docusate sodium (COLACE) 100 MG capsule Take 100 mg by mouth once daily.      dorzolamide-timolol 2-0.5% (COSOPT) 22.3-6.8 mg/mL ophthalmic solution Place 1 drop into both eyes 2 (two) times daily.      ELIQUIS 2.5 mg Tab Take 2.5 mg by mouth 2 (two) times daily. NEW Rx - NOT YET STARTED      latanoprost 0.005 % ophthalmic solution Place 1 drop into both eyes every evening.      metoprolol tartrate (LOPRESSOR) 25 MG tablet Take 1 tablet (25 mg total) by mouth 2 (two) times daily. 60 tablet 11    ondansetron (ZOFRAN) 4 MG tablet Take 1 tablet (4 mg total) by mouth every 8 (eight) hours as needed for Nausea. 15 tablet 0    sotaloL (BETAPACE) 80 MG tablet Take 80 mg by mouth 2 (two) times daily. Take 1/2 tablet (40mg) BID       No current facility-administered medications on file prior to visit.        Review of patient's allergies indicates:   Allergen Reactions    Cyclobenzaprine     Fish containing products Hives    Peanut Other (See Comments)    Tramadol Itching       OBJECTIVE:      Vital Signs:  Vitals:    07/27/23 2322   BP: 110/68   Pulse: (!) 55   Resp: 18     There is no height or weight on file to calculate BMI.     Physical Exam:  Physical Exam  Constitutional:       Appearance: Normal appearance.   HENT:      Head: Normocephalic and atraumatic.   Cardiovascular:      Rate and Rhythm: Normal rate and regular rhythm.      Pulses: Normal pulses.      Heart sounds: Normal heart sounds.   Pulmonary:      Effort: Pulmonary effort is normal.      Breath sounds: Normal breath sounds.   Abdominal:      General: Bowel sounds are normal.      Palpations: Abdomen is soft.   Musculoskeletal:         General: Normal range of motion.      Cervical back: Normal range of motion and neck supple.   Skin:     General: Skin is warm and dry.      Comments: HD access to LUE.    Neurological:      General: No focal deficit present.      Mental Status: She is alert and oriented to person, place, and time. Mental status is at baseline.      Comments: Confusion at times, easily reoriented.   Psychiatric:         Mood and Affect: Mood normal.         Behavior: Behavior normal.       Laboratory  Lab Results   Component Value Date    WBC 8.55 07/22/2023    HGB 12.3 07/22/2023    HCT 39.6 07/22/2023     (H) 07/22/2023     07/22/2023     Lab Results   Component Value Date    INR 1.5 (H) 07/21/2023    INR 1.6 (H) 07/20/2023    INR 9.7 (HH) 07/19/2023     Lab Results   Component Value Date    HGBA1C 5.3 01/26/2022     No results for input(s): POCTGLUCOSE in the last 72 hours.        TRANSITION OF CARE:     [facility] Contact Note: 7/21    Family and/or Caretaker present at visit?  No.  Diagnostic tests reviewed/disposition: No diagnosic tests pending after this hospitalization.  Disease/illness education: ESRD, Blood thinners, Diarrhea  Home health/community services discussion/referrals: Patient has home health established at [facility] . Referral to case management.  Establishment or  re-establishment of referral orders for community resources:  Patient needs transportation resources .   Discussion with other health care providers: No discussion with other health care providers necessary.     Transition of Care Visit:     I have reviewed and updated the history and problem list.  I have reconciled the medication list.  I have discussed the hospitalization and current medical issues, prognosis and plans with the patient/family.  I  spent more than 50% of time discussing the care with the patient/family.  Total Face-to-Face Encounter: 60 minutes.    Medications Reconciliation:   I have reconciled the patient's home medications and discharge medications with the patient/family. I have updated all changes.  Refer to After-Visit Medication List.    ASSESSMENT & PLAN:       1. Poisoning by warfarin sodium, undetermined intent, initial encounter  - Home health nurse believes patient was taking double dose of coumadin prior to hospitalization.   -Will consult case management for medication management resources as  nurse has few visits left. Concerned for patient's ability to safely manage medications.  -     Ambulatory referral/consult to [facility]    2. Diarrhea, unspecified type   -Discussed trying immodium OTC first.    - nurse will arrange  of OTC medication as patient does not have transportation.     3. Dependence on renal dialysis   -Stable on MWF schedule   -Patient needs transportation resources.       Were controlled substances prescribed?  No. No      Patient Instructions Given:  - Continue all medications, treatments and therapies as ordered.   - Follow all instructions, recommendations as discussed.  - Maintain Safety Precautions at all times.  - Attend all medical appointments as scheduled.  - For worsening symptoms: call Primary Care Physician or Nurse Practitioner.  - For emergencies, call 911 or immediately report to the nearest emergency room.    After Visit  Medication List :     Medication List            Accurate as of July 27, 2023 11:34 PM. If you have any questions, ask your nurse or doctor.                CONTINUE taking these medications      atorvastatin 40 MG tablet  Commonly known as: LIPITOR     b complex vitamins tablet     calcium acetate(phosphat bind) 667 mg tablet  Commonly known as: PHOSLO     diltiaZEM 120 MG Cp24  Commonly known as: CARDIZEM CD  Take 1 capsule (120 mg total) by mouth 2 (two) times a day.     docusate sodium 100 MG capsule  Commonly known as: COLACE     dorzolamide-timolol 2-0.5% 22.3-6.8 mg/mL ophthalmic solution  Commonly known as: COSOPT     ELIQUIS 2.5 mg Tab  Generic drug: apixaban     latanoprost 0.005 % ophthalmic solution     metoprolol tartrate 25 MG tablet  Commonly known as: LOPRESSOR  Take 1 tablet (25 mg total) by mouth 2 (two) times daily.     ondansetron 4 MG tablet  Commonly known as: ZOFRAN  Take 1 tablet (4 mg total) by mouth every 8 (eight) hours as needed for Nausea.     sotaloL 80 MG tablet  Commonly known as: BETAPACE            Future Appointments   Date Time Provider Department Center   7/31/2023  1:00 PM ARON Magana G FA MD [facility]           Signature:

## 2023-11-09 ENCOUNTER — HOSPITAL ENCOUNTER (INPATIENT)
Facility: HOSPITAL | Age: 83
LOS: 4 days | Discharge: HOME HEALTH | DRG: 193 | End: 2023-11-14
Attending: STUDENT IN AN ORGANIZED HEALTH CARE EDUCATION/TRAINING PROGRAM | Admitting: STUDENT IN AN ORGANIZED HEALTH CARE EDUCATION/TRAINING PROGRAM
Payer: MEDICARE

## 2023-11-09 DIAGNOSIS — R11.10 VOMITING: ICD-10-CM

## 2023-11-09 DIAGNOSIS — I48.91 A-FIB: ICD-10-CM

## 2023-11-09 DIAGNOSIS — J18.9 PNEUMONIA: Primary | ICD-10-CM

## 2023-11-09 DIAGNOSIS — M47.812 CERVICAL SPONDYLOSIS: ICD-10-CM

## 2023-11-09 DIAGNOSIS — R06.02 SOB (SHORTNESS OF BREATH): ICD-10-CM

## 2023-11-09 PROBLEM — R11.0 NAUSEA: Status: ACTIVE | Noted: 2023-11-09

## 2023-11-09 LAB
ALBUMIN SERPL BCP-MCNC: 3.7 G/DL (ref 3.5–5.2)
ALP SERPL-CCNC: 54 U/L (ref 55–135)
ALT SERPL W/O P-5'-P-CCNC: 20 U/L (ref 10–44)
ANION GAP SERPL CALC-SCNC: 12 MMOL/L (ref 8–16)
AST SERPL-CCNC: 25 U/L (ref 10–40)
BACTERIA #/AREA URNS HPF: NEGATIVE /HPF
BASOPHILS # BLD AUTO: 0.02 K/UL (ref 0–0.2)
BASOPHILS NFR BLD: 0.3 % (ref 0–1.9)
BILIRUB SERPL-MCNC: 1.1 MG/DL (ref 0.1–1)
BILIRUB UR QL STRIP: NEGATIVE
BUN SERPL-MCNC: 44 MG/DL (ref 8–23)
CALCIUM SERPL-MCNC: 8.2 MG/DL (ref 8.7–10.5)
CHLORIDE SERPL-SCNC: 99 MMOL/L (ref 95–110)
CLARITY UR: CLEAR
CO2 SERPL-SCNC: 32 MMOL/L (ref 23–29)
COLOR UR: YELLOW
CREAT SERPL-MCNC: 7.8 MG/DL (ref 0.5–1.4)
DIFFERENTIAL METHOD: ABNORMAL
EOSINOPHIL # BLD AUTO: 0.1 K/UL (ref 0–0.5)
EOSINOPHIL NFR BLD: 0.7 % (ref 0–8)
ERYTHROCYTE [DISTWIDTH] IN BLOOD BY AUTOMATED COUNT: 15.6 % (ref 11.5–14.5)
EST. GFR  (NO RACE VARIABLE): 4.8 ML/MIN/1.73 M^2
GLUCOSE SERPL-MCNC: 106 MG/DL (ref 70–110)
GLUCOSE UR QL STRIP: ABNORMAL
HCT VFR BLD AUTO: 32.2 % (ref 37–48.5)
HGB BLD-MCNC: 10.2 G/DL (ref 12–16)
HGB UR QL STRIP: ABNORMAL
HYALINE CASTS #/AREA URNS LPF: 3 /LPF
IMM GRANULOCYTES # BLD AUTO: 0.02 K/UL (ref 0–0.04)
IMM GRANULOCYTES NFR BLD AUTO: 0.3 % (ref 0–0.5)
INFLUENZA A, MOLECULAR: NEGATIVE
INFLUENZA B, MOLECULAR: NEGATIVE
INR PPP: 1.1 (ref 0.8–1.2)
KETONES UR QL STRIP: NEGATIVE
LEUKOCYTE ESTERASE UR QL STRIP: NEGATIVE
LIPASE SERPL-CCNC: 12 U/L (ref 4–60)
LYMPHOCYTES # BLD AUTO: 0.8 K/UL (ref 1–4.8)
LYMPHOCYTES NFR BLD: 11 % (ref 18–48)
MCH RBC QN AUTO: 30.9 PG (ref 27–31)
MCHC RBC AUTO-ENTMCNC: 31.7 G/DL (ref 32–36)
MCV RBC AUTO: 98 FL (ref 82–98)
MICROSCOPIC COMMENT: ABNORMAL
MONOCYTES # BLD AUTO: 1.3 K/UL (ref 0.3–1)
MONOCYTES NFR BLD: 17.1 % (ref 4–15)
MRSA SCREEN BY PCR: NEGATIVE
NEUTROPHILS # BLD AUTO: 5.2 K/UL (ref 1.8–7.7)
NEUTROPHILS NFR BLD: 70.6 % (ref 38–73)
NITRITE UR QL STRIP: NEGATIVE
NRBC BLD-RTO: 0 /100 WBC
PH UR STRIP: >8 [PH] (ref 5–8)
PLATELET # BLD AUTO: 186 K/UL (ref 150–450)
PMV BLD AUTO: 10.1 FL (ref 9.2–12.9)
POTASSIUM SERPL-SCNC: 3.8 MMOL/L (ref 3.5–5.1)
PROT SERPL-MCNC: 6.6 G/DL (ref 6–8.4)
PROT UR QL STRIP: ABNORMAL
PROTHROMBIN TIME: 11.9 SEC (ref 9–12.5)
RBC # BLD AUTO: 3.3 M/UL (ref 4–5.4)
RBC #/AREA URNS HPF: 1 /HPF (ref 0–4)
SARS-COV-2 RDRP RESP QL NAA+PROBE: NEGATIVE
SODIUM SERPL-SCNC: 143 MMOL/L (ref 136–145)
SP GR UR STRIP: 1.01 (ref 1–1.03)
SPECIMEN SOURCE: NORMAL
SQUAMOUS #/AREA URNS HPF: 3 /HPF
URN SPEC COLLECT METH UR: ABNORMAL
UROBILINOGEN UR STRIP-ACNC: ABNORMAL EU/DL
WBC # BLD AUTO: 7.38 K/UL (ref 3.9–12.7)
WBC #/AREA URNS HPF: 0 /HPF (ref 0–5)

## 2023-11-09 PROCEDURE — 87641 MR-STAPH DNA AMP PROBE: CPT | Performed by: STUDENT IN AN ORGANIZED HEALTH CARE EDUCATION/TRAINING PROGRAM

## 2023-11-09 PROCEDURE — 96367 TX/PROPH/DG ADDL SEQ IV INF: CPT

## 2023-11-09 PROCEDURE — 85610 PROTHROMBIN TIME: CPT | Performed by: STUDENT IN AN ORGANIZED HEALTH CARE EDUCATION/TRAINING PROGRAM

## 2023-11-09 PROCEDURE — 87040 BLOOD CULTURE FOR BACTERIA: CPT | Mod: 59 | Performed by: STUDENT IN AN ORGANIZED HEALTH CARE EDUCATION/TRAINING PROGRAM

## 2023-11-09 PROCEDURE — 63600175 PHARM REV CODE 636 W HCPCS: Performed by: STUDENT IN AN ORGANIZED HEALTH CARE EDUCATION/TRAINING PROGRAM

## 2023-11-09 PROCEDURE — 36415 COLL VENOUS BLD VENIPUNCTURE: CPT | Performed by: STUDENT IN AN ORGANIZED HEALTH CARE EDUCATION/TRAINING PROGRAM

## 2023-11-09 PROCEDURE — 87449 NOS EACH ORGANISM AG IA: CPT | Performed by: STUDENT IN AN ORGANIZED HEALTH CARE EDUCATION/TRAINING PROGRAM

## 2023-11-09 PROCEDURE — 93005 ELECTROCARDIOGRAM TRACING: CPT | Performed by: INTERNAL MEDICINE

## 2023-11-09 PROCEDURE — 85025 COMPLETE CBC W/AUTO DIFF WBC: CPT

## 2023-11-09 PROCEDURE — 87502 INFLUENZA DNA AMP PROBE: CPT

## 2023-11-09 PROCEDURE — 25000003 PHARM REV CODE 250: Performed by: STUDENT IN AN ORGANIZED HEALTH CARE EDUCATION/TRAINING PROGRAM

## 2023-11-09 PROCEDURE — 81001 URINALYSIS AUTO W/SCOPE: CPT

## 2023-11-09 PROCEDURE — G0378 HOSPITAL OBSERVATION PER HR: HCPCS

## 2023-11-09 PROCEDURE — 99285 EMERGENCY DEPT VISIT HI MDM: CPT | Mod: 25

## 2023-11-09 PROCEDURE — 96365 THER/PROPH/DIAG IV INF INIT: CPT

## 2023-11-09 PROCEDURE — 83690 ASSAY OF LIPASE: CPT

## 2023-11-09 PROCEDURE — 80053 COMPREHEN METABOLIC PANEL: CPT

## 2023-11-09 PROCEDURE — 93010 ELECTROCARDIOGRAM REPORT: CPT | Mod: ,,, | Performed by: INTERNAL MEDICINE

## 2023-11-09 PROCEDURE — 25000003 PHARM REV CODE 250

## 2023-11-09 PROCEDURE — U0002 COVID-19 LAB TEST NON-CDC: HCPCS

## 2023-11-09 PROCEDURE — 63700000 PHARM REV CODE 250 ALT 637 W/O HCPCS: Performed by: STUDENT IN AN ORGANIZED HEALTH CARE EDUCATION/TRAINING PROGRAM

## 2023-11-09 PROCEDURE — 93010 EKG 12-LEAD: ICD-10-PCS | Mod: ,,, | Performed by: INTERNAL MEDICINE

## 2023-11-09 RX ORDER — DILTIAZEM HYDROCHLORIDE 120 MG/1
120 CAPSULE, COATED, EXTENDED RELEASE ORAL 2 TIMES DAILY
Status: DISCONTINUED | OUTPATIENT
Start: 2023-11-09 | End: 2023-11-14 | Stop reason: HOSPADM

## 2023-11-09 RX ORDER — LATANOPROST 50 UG/ML
1 SOLUTION/ DROPS OPHTHALMIC NIGHTLY
Status: DISCONTINUED | OUTPATIENT
Start: 2023-11-09 | End: 2023-11-14 | Stop reason: HOSPADM

## 2023-11-09 RX ORDER — ONDANSETRON 2 MG/ML
4 INJECTION INTRAMUSCULAR; INTRAVENOUS EVERY 8 HOURS PRN
Status: CANCELLED | OUTPATIENT
Start: 2023-11-09

## 2023-11-09 RX ORDER — TALC
6 POWDER (GRAM) TOPICAL NIGHTLY PRN
Status: DISCONTINUED | OUTPATIENT
Start: 2023-11-09 | End: 2023-11-14 | Stop reason: HOSPADM

## 2023-11-09 RX ORDER — SOTALOL HYDROCHLORIDE 80 MG/1
80 TABLET ORAL 2 TIMES DAILY
Status: DISCONTINUED | OUTPATIENT
Start: 2023-11-09 | End: 2023-11-14 | Stop reason: HOSPADM

## 2023-11-09 RX ORDER — SCOLOPAMINE TRANSDERMAL SYSTEM 1 MG/1
1 PATCH, EXTENDED RELEASE TRANSDERMAL
Status: DISCONTINUED | OUTPATIENT
Start: 2023-11-09 | End: 2023-11-11

## 2023-11-09 RX ORDER — AZITHROMYCIN 250 MG/1
250 TABLET, FILM COATED ORAL DAILY
Status: DISCONTINUED | OUTPATIENT
Start: 2023-11-10 | End: 2023-11-09

## 2023-11-09 RX ORDER — SODIUM CHLORIDE 0.9 % (FLUSH) 0.9 %
10 SYRINGE (ML) INJECTION
Status: DISCONTINUED | OUTPATIENT
Start: 2023-11-09 | End: 2023-11-14 | Stop reason: HOSPADM

## 2023-11-09 RX ORDER — ACETAMINOPHEN 500 MG
1000 TABLET ORAL
Status: COMPLETED | OUTPATIENT
Start: 2023-11-09 | End: 2023-11-09

## 2023-11-09 RX ORDER — AZITHROMYCIN 250 MG/1
500 TABLET, FILM COATED ORAL
Status: COMPLETED | OUTPATIENT
Start: 2023-11-09 | End: 2023-11-09

## 2023-11-09 RX ORDER — ATORVASTATIN CALCIUM 40 MG/1
40 TABLET, FILM COATED ORAL DAILY
Status: DISCONTINUED | OUTPATIENT
Start: 2023-11-10 | End: 2023-11-14 | Stop reason: HOSPADM

## 2023-11-09 RX ORDER — ONDANSETRON 4 MG/1
4 TABLET, FILM COATED ORAL EVERY 8 HOURS PRN
Status: CANCELLED | OUTPATIENT
Start: 2023-11-09

## 2023-11-09 RX ORDER — CALCIUM ACETATE 667 MG/1
667 CAPSULE ORAL 2 TIMES DAILY WITH MEALS
Status: DISCONTINUED | OUTPATIENT
Start: 2023-11-10 | End: 2023-11-14 | Stop reason: HOSPADM

## 2023-11-09 RX ORDER — DORZOLAMIDE HYDROCHLORIDE AND TIMOLOL MALEATE 20; 5 MG/ML; MG/ML
1 SOLUTION/ DROPS OPHTHALMIC 2 TIMES DAILY
Status: DISCONTINUED | OUTPATIENT
Start: 2023-11-09 | End: 2023-11-14 | Stop reason: HOSPADM

## 2023-11-09 RX ADMIN — SCOPALAMINE 1 PATCH: 1 PATCH, EXTENDED RELEASE TRANSDERMAL at 11:11

## 2023-11-09 RX ADMIN — ACETAMINOPHEN 1000 MG: 500 TABLET ORAL at 05:11

## 2023-11-09 RX ADMIN — DILTIAZEM HYDROCHLORIDE 120 MG: 120 CAPSULE, COATED, EXTENDED RELEASE ORAL at 11:11

## 2023-11-09 RX ADMIN — Medication 6 MG: at 11:11

## 2023-11-09 RX ADMIN — CEFTRIAXONE SODIUM 1 G: 1 INJECTION, POWDER, FOR SOLUTION INTRAMUSCULAR; INTRAVENOUS at 09:11

## 2023-11-09 RX ADMIN — SOTALOL HYDROCHLORIDE 80 MG: 80 TABLET ORAL at 11:11

## 2023-11-09 RX ADMIN — VANCOMYCIN HYDROCHLORIDE 1250 MG: 1.25 INJECTION, POWDER, LYOPHILIZED, FOR SOLUTION INTRAVENOUS at 11:11

## 2023-11-09 RX ADMIN — AZITHROMYCIN MONOHYDRATE 500 MG: 250 TABLET ORAL at 09:11

## 2023-11-09 RX ADMIN — APIXABAN 2.5 MG: 2.5 TABLET, FILM COATED ORAL at 11:11

## 2023-11-09 NOTE — ED PROVIDER NOTES
"Encounter Date: 11/9/2023       History     Chief Complaint   Patient presents with    Shortness of Breath     Shortness of breat started this AM, pt had dialysis yx, pt not sure how many L removed.      HPI  83-year-old presenting with mild rhinorrhea, mild shortness of breath, mild nausea, vomiting mild abdominal pain that improved after vomiting.  Patient reports she has "a little of everything but overall I feel okay but more tired than usual today".  She reports no fever or chills but does have a fever of 100.7 today.  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    Osteopenia of multiple sites 3/9/2018    Based upon bone density measurements. Patient also has chronic kidney disease.    Stroke 11/2016     Past Surgical History:   Procedure Laterality Date    ANGIOGRAM, CORONARY, WITH LEFT HEART CATHETERIZATION N/A 2/7/2022    Procedure: Angiogram, Coronary, with Left Heart Cath;  Surgeon: Gino Leal MD;  Location: [facility] CATH/EP LAB;  Service: Cardiology;  Laterality: N/A;    CARDIAC SURGERY      stents    EYE SURGERY      WRIST SURGERY       Family History   Problem Relation Age of Onset    Heart disease Mother     Cancer Father      Social History     Tobacco Use    Smoking status: Never    Smokeless tobacco: Never   Substance Use Topics    Alcohol use: No    Drug use: No     Review of Systems "   Constitutional:  Positive for fatigue.   HENT:  Positive for rhinorrhea.    Respiratory:  Positive for shortness of breath.    Gastrointestinal:  Positive for abdominal pain, nausea and vomiting.       Physical Exam     Initial Vitals   BP Pulse Resp Temp SpO2   11/09/23 1651 11/09/23 1651 11/09/23 1651 11/09/23 1654 11/09/23 1651   138/63 78 16 (!) 100.7 °F (38.2 °C) 97 %      MAP       --                Physical Exam    Nursing note and vitals reviewed.  Constitutional: She appears well-developed. She is not diaphoretic.   HENT:   Head: Normocephalic.   Eyes: Right eye exhibits no discharge. Left eye exhibits no discharge. No scleral icterus.   Neck: Neck supple. No tracheal deviation present.   Cardiovascular:  Normal rate and regular rhythm.           Pulmonary/Chest: Breath sounds normal. No stridor. No respiratory distress. She has no wheezes. She has no rhonchi. She has no rales.   Tachypneic, on my initial eval pt sleeping comfortably but tachypneic to mid to upper 30's    Abdominal: Abdomen is soft. She exhibits no distension. There is no abdominal tenderness. There is no rebound and no guarding.   Musculoskeletal:         General: No edema.      Cervical back: Neck supple.     Neurological: She is alert and oriented to person, place, and time.   Skin: Skin is warm and dry.         ED Course   Procedures  Labs Reviewed   CBC W/ AUTO DIFFERENTIAL - Abnormal; Notable for the following components:       Result Value    RBC 3.30 (*)     Hemoglobin 10.2 (*)     Hematocrit 32.2 (*)     MCHC 31.7 (*)     RDW 15.6 (*)     Lymph # 0.8 (*)     Mono # 1.3 (*)     Lymph % 11.0 (*)     Mono % 17.1 (*)     All other components within normal limits   COMPREHENSIVE METABOLIC PANEL - Abnormal; Notable for the following components:    CO2 32 (*)     BUN 44 (*)     Creatinine 7.8 (*)     Calcium 8.2 (*)     Total Bilirubin 1.1 (*)     Alkaline Phosphatase 54 (*)     eGFR 4.8 (*)     All other components within normal limits    URINALYSIS, REFLEX TO URINE CULTURE - Abnormal; Notable for the following components:    pH, UA >8.0 (*)     Protein, UA 2+ (*)     Glucose, UA Trace (*)     Occult Blood UA Trace (*)     Urobilinogen, UA 2.0-3.0 (*)     All other components within normal limits    Narrative:     Specimen Source->Urine   URINALYSIS MICROSCOPIC - Abnormal; Notable for the following components:    Hyaline Casts, UA 3 (*)     All other components within normal limits    Narrative:     Specimen Source->Urine   CULTURE, BLOOD   CULTURE, BLOOD   MRSA SCREEN BY PCR   INFLUENZA A AND B ANTIGEN    Narrative:     Specimen Source->Nasopharyngeal Swab   SARS-COV-2 RNA AMPLIFICATION, QUAL   LIPASE   LEGIONELLA ANTIGEN, URINE RANDOM   LEGIONELLA ANTIGEN, URINE RANDOM   PROTIME-INR          Imaging Results              CT Chest Abdomen Pelvis Without Contrast (XPD) (Final result)  Result time 11/09/23 20:40:56   Procedure changed from CT Chest Without Contrast     Final result by Juanito Kennedy DO (11/09/23 20:40:56)                   Narrative:    INDICATION: sob    COMPARISON: None.    TECHNIQUE:    CT of the chest, abdomen and pelvis WITHOUT intravenous contrast.  The chest, abdomen and pelvis were scanned utilizing a multidetector helical scanner from the lung apex to the lesser trochanter.  Axial, coronal and sagittal reformations were obtained. This exam was performed according to our departmental dose-optimization program, which includes automated exposure control, adjustment of the mA and/or kV according to patient size, and/or use of iterative reconstruction technique.    FINDINGS:    Lack of intravenous contrast degrades evaluation, including evaluation for hilar lymphadenopathy and sensitivity for organ pathology.    CHEST:    Lines/tubes:  None.    Lungs and Airways:  No lobar consolidation. Scattered multifocal pulmonary ground glass associated with clustered nodularity. For example in the right upper lobe on image 40. Findings are  seen throughout all lobes but are most pronounced in the apical segment of the right lower lobe on image 65.    Pleura: Trace pleural effusions. No pneumothoraces.    Heart and mediastinum: Thyroid intact. Expected aortic arch configuration. Scattered atherosclerosis. Multiple enlarged mediastinal lymph nodes identified. Bilateral hilar fullness, likely secondary to lymphadenopathy. For reference a prominent precarinal lymph node measures 1.9 x 1.0 cm on axial image 56. Heart is mildly enlarged. Coronary atherosclerosis. No pericardial effusion.    Soft tissues: No drainable fluid collections in the superficial soft tissues.    Bones: No acute osseous lesions.    ABDOMEN/PELVIS:    Hepatobiliary: No focal hepatic lesions.  Noninflamed gallbladder. No biliary ductal dilatation.    Spleen: No splenomegaly.    Pancreas: No main pancreatic ductal dilatation.    Adrenals: No adrenal nodules.    Kidneys/Ureters: Severely atrophic right kidney. No hydronephrosis.    Pelvic Organs/Bladder: Unremarkable.    Peritoneum/Retroperitoneum: No free air or fluid.    Lymph nodes: No bulky lymphadenopathy in the abdomen or pelvis.    Vessels: Severe diffuse advanced atherosclerosis.    GI Tract: Extensive diverticulosis. No diverticulitis. No evidence for obstruction. No evidence for acute appendicitis.    Bones and Soft Tissue: No acute findings. No suspicious lytic or blastic osseous abnormalities. Grade 1 anterolisthesis of L4 on L5.    IMPRESSION:    1. Multifocal pulmonary ground glass with areas of clustered nodularity bilaterally. Findings are most pronounced in right lower lobe and are consistent with an infectious process.  2. Mediastinal and bilateral hilar lymphadenopathy which could be reactive.  3. Advanced atherosclerosis.  4. Diverticulosis coli.    Electronically signed by:  Juanito Kennedy DO  11/09/2023 08:40 PM CST Workstation: [id number]                                     X-Ray Chest AP Portable (Final result)   Result time 11/09/23 18:13:48      Final result by Penny Osuna DO (11/09/23 18:13:48)                   Narrative:    AP chest radiograph: 11/9/2023 6:13 PM CST    History: 83 years  old Female with dyspnea.    Comparison: None available    Findings: The cardiomediastinal silhouette is enlarged.    No pneumothorax is seen.    There are bilateral interstitial airspace opacities..    No discrete pleural effusion is apparent.    Impression: There are bilateral interstitial airspace opacities. These can be seen with edema and/or infection.    Electronically signed by:  Penny Osuna DO  11/09/2023 06:13 PM CST Workstation: [id number]                                     Medications   atorvastatin tablet 40 mg (has no administration in time range)   calcium acetate(phosphat bind) tablet 667 mg (has no administration in time range)   diltiaZEM 24 hr capsule 120 mg (has no administration in time range)   dorzolamide-timolol 2-0.5% ophthalmic solution 1 drop (has no administration in time range)   apixaban tablet 2.5 mg (has no administration in time range)   latanoprost 0.005 % ophthalmic solution 1 drop (has no administration in time range)   sotaloL tablet 80 mg (has no administration in time range)   sodium chloride 0.9% flush 10 mL (has no administration in time range)   melatonin tablet 6 mg (has no administration in time range)   scopolamine 1.3-1.5 mg (1 mg over 3 days) 1 patch (has no administration in time range)   cefTRIAXone (ROCEPHIN) 1 g in dextrose 5 % 100 mL IVPB (ready to mix) (has no administration in time range)   vancomycin - pharmacy to dose (has no administration in time range)   vancomycin 1.25 g in dextrose 5% 250 mL IVPB (ready to mix) (has no administration in time range)   acetaminophen tablet 1,000 mg (1,000 mg Oral Given 11/9/23 1707)   cefTRIAXone (ROCEPHIN) 1 g in dextrose 5 % 100 mL IVPB (ready to mix) (1 g Intravenous New Bag 11/9/23 2135)   azithromycin tablet 500 mg (500 mg Oral Given  "11/9/23 3098)     Medical Decision Making  Amount and/or Complexity of Data Reviewed  Labs: ordered.  Radiology: ordered.    Risk  OTC drugs.  Prescription drug management.  Decision regarding hospitalization.    83-year-old presenting withmild rhinorrhea, mild shortness of breath, mild nausea, vomiting mild abdominal pain that improved after vomiting.  Patient reports she has "a little of everything but overall I feel okay but more tired than usual today".  She reports no fever or chills but does have a fever of 100.7 today.     Vitals within acceptable limits on presentation except for temperature of 100.7 and tachypnea to mid 30s    Differential includes sepsis, viral syndrome, gastroenteritis, pneumonia, ACS    Lower suspicion for ACS based on patient's symptoms that seem infectious and also with fever therefore no cardiac workup at this time.  Infectious workup completed with CBC, CMP, urinalysis, lipase, COVID, flu, chest x-ray, CT chest abdomen and pelvis with p.o. but not IV contrast and all within acceptable limits or near patient's baseline except for bilateral pneumonia.  Patient's tachypnea improving after fever reduction.  Still no respiratory distress.  Patient reports on multiple reassessments that she continues to feel fatigued otherwise feels fine.  Patient lives alone.  Elevated PSA port score therefore admitted for pneumonia.  Ceftriaxone and azithromycin to be given after blood cultures collected.  Spoke to  who agrees to admit pt.   Sylvia Pereyra MD  Emergency Medicine Staff Physician  9:12 PM                              Clinical Impression:   Final diagnoses:  [R11.10] Vomiting  [R06.02] SOB (shortness of breath)        ED Disposition Condition    Observation                 Sylvia Pereyra MD  11/09/23 7484    "

## 2023-11-10 PROBLEM — J96.01 ACUTE RESPIRATORY FAILURE WITH HYPOXIA: Status: ACTIVE | Noted: 2023-11-10

## 2023-11-10 LAB
ALLENS TEST: ABNORMAL
ALLENS TEST: ABNORMAL
ANION GAP SERPL CALC-SCNC: 11 MMOL/L (ref 8–16)
BASOPHILS # BLD AUTO: 0.02 K/UL (ref 0–0.2)
BASOPHILS NFR BLD: 0.3 % (ref 0–1.9)
BUN SERPL-MCNC: 49 MG/DL (ref 8–23)
CALCIUM SERPL-MCNC: 7.8 MG/DL (ref 8.7–10.5)
CHLORIDE SERPL-SCNC: 94 MMOL/L (ref 95–110)
CO2 SERPL-SCNC: 31 MMOL/L (ref 23–29)
CREAT SERPL-MCNC: 8.1 MG/DL (ref 0.5–1.4)
DELSYS: ABNORMAL
DELSYS: ABNORMAL
DIFFERENTIAL METHOD: ABNORMAL
EOSINOPHIL # BLD AUTO: 0.3 K/UL (ref 0–0.5)
EOSINOPHIL NFR BLD: 5.1 % (ref 0–8)
ERYTHROCYTE [DISTWIDTH] IN BLOOD BY AUTOMATED COUNT: 15.2 % (ref 11.5–14.5)
EST. GFR  (NO RACE VARIABLE): 4.5 ML/MIN/1.73 M^2
GLUCOSE SERPL-MCNC: 72 MG/DL (ref 70–110)
HCO3 UR-SCNC: 22.4 MMOL/L (ref 24–28)
HCO3 UR-SCNC: 22.6 MMOL/L (ref 24–28)
HCT VFR BLD AUTO: 30.9 % (ref 37–48.5)
HGB BLD-MCNC: 10 G/DL (ref 12–16)
IMM GRANULOCYTES # BLD AUTO: 0.02 K/UL (ref 0–0.04)
IMM GRANULOCYTES NFR BLD AUTO: 0.3 % (ref 0–0.5)
LYMPHOCYTES # BLD AUTO: 1.2 K/UL (ref 1–4.8)
LYMPHOCYTES NFR BLD: 18 % (ref 18–48)
MCH RBC QN AUTO: 31.2 PG (ref 27–31)
MCHC RBC AUTO-ENTMCNC: 32.4 G/DL (ref 32–36)
MCV RBC AUTO: 96 FL (ref 82–98)
MONOCYTES # BLD AUTO: 1.3 K/UL (ref 0.3–1)
MONOCYTES NFR BLD: 19.4 % (ref 4–15)
NEUTROPHILS # BLD AUTO: 3.7 K/UL (ref 1.8–7.7)
NEUTROPHILS NFR BLD: 56.9 % (ref 38–73)
NRBC BLD-RTO: 0 /100 WBC
PCO2 BLDA: 36.5 MMHG (ref 35–45)
PCO2 BLDA: 37.2 MMHG (ref 35–45)
PH SMN: 7.39 [PH] (ref 7.35–7.45)
PH SMN: 7.39 [PH] (ref 7.35–7.45)
PLATELET # BLD AUTO: 181 K/UL (ref 150–450)
PMV BLD AUTO: 10.2 FL (ref 9.2–12.9)
PO2 BLDA: 53 MMHG (ref 80–100)
PO2 BLDA: 54 MMHG (ref 80–100)
POC BE: -2 MMOL/L
POC BE: -3 MMOL/L
POC SATURATED O2: 87 % (ref 95–100)
POC SATURATED O2: 88 % (ref 95–100)
POC TCO2: 23 MMOL/L (ref 23–27)
POC TCO2: 24 MMOL/L (ref 23–27)
POTASSIUM SERPL-SCNC: 3.6 MMOL/L (ref 3.5–5.1)
PROCALCITONIN SERPL IA-MCNC: 3.9 NG/ML (ref 0–0.5)
RBC # BLD AUTO: 3.21 M/UL (ref 4–5.4)
SAMPLE: ABNORMAL
SAMPLE: ABNORMAL
SITE: ABNORMAL
SITE: ABNORMAL
SODIUM SERPL-SCNC: 136 MMOL/L (ref 136–145)
VANCOMYCIN SERPL-MCNC: 15.4 UG/ML
WBC # BLD AUTO: 6.5 K/UL (ref 3.9–12.7)

## 2023-11-10 PROCEDURE — 25000242 PHARM REV CODE 250 ALT 637 W/ HCPCS: Performed by: INTERNAL MEDICINE

## 2023-11-10 PROCEDURE — 25000003 PHARM REV CODE 250: Performed by: INTERNAL MEDICINE

## 2023-11-10 PROCEDURE — 87340 HEPATITIS B SURFACE AG IA: CPT | Performed by: INTERNAL MEDICINE

## 2023-11-10 PROCEDURE — 94761 N-INVAS EAR/PLS OXIMETRY MLT: CPT

## 2023-11-10 PROCEDURE — 93010 ELECTROCARDIOGRAM REPORT: CPT | Mod: ,,, | Performed by: INTERNAL MEDICINE

## 2023-11-10 PROCEDURE — 86704 HEP B CORE ANTIBODY TOTAL: CPT | Performed by: INTERNAL MEDICINE

## 2023-11-10 PROCEDURE — 36415 COLL VENOUS BLD VENIPUNCTURE: CPT | Performed by: STUDENT IN AN ORGANIZED HEALTH CARE EDUCATION/TRAINING PROGRAM

## 2023-11-10 PROCEDURE — 36415 COLL VENOUS BLD VENIPUNCTURE: CPT | Performed by: INTERNAL MEDICINE

## 2023-11-10 PROCEDURE — 93005 ELECTROCARDIOGRAM TRACING: CPT | Performed by: INTERNAL MEDICINE

## 2023-11-10 PROCEDURE — 25000003 PHARM REV CODE 250: Performed by: STUDENT IN AN ORGANIZED HEALTH CARE EDUCATION/TRAINING PROGRAM

## 2023-11-10 PROCEDURE — 12000002 HC ACUTE/MED SURGE SEMI-PRIVATE ROOM

## 2023-11-10 PROCEDURE — 80048 BASIC METABOLIC PNL TOTAL CA: CPT | Performed by: STUDENT IN AN ORGANIZED HEALTH CARE EDUCATION/TRAINING PROGRAM

## 2023-11-10 PROCEDURE — 63600175 PHARM REV CODE 636 W HCPCS: Performed by: INTERNAL MEDICINE

## 2023-11-10 PROCEDURE — 96367 TX/PROPH/DG ADDL SEQ IV INF: CPT

## 2023-11-10 PROCEDURE — 84145 PROCALCITONIN (PCT): CPT | Performed by: STUDENT IN AN ORGANIZED HEALTH CARE EDUCATION/TRAINING PROGRAM

## 2023-11-10 PROCEDURE — 63600175 PHARM REV CODE 636 W HCPCS: Performed by: STUDENT IN AN ORGANIZED HEALTH CARE EDUCATION/TRAINING PROGRAM

## 2023-11-10 PROCEDURE — 86706 HEP B SURFACE ANTIBODY: CPT | Performed by: INTERNAL MEDICINE

## 2023-11-10 PROCEDURE — 80202 ASSAY OF VANCOMYCIN: CPT | Performed by: STUDENT IN AN ORGANIZED HEALTH CARE EDUCATION/TRAINING PROGRAM

## 2023-11-10 PROCEDURE — 99900031 HC PATIENT EDUCATION (STAT)

## 2023-11-10 PROCEDURE — 93010 EKG 12-LEAD: ICD-10-PCS | Mod: ,,, | Performed by: INTERNAL MEDICINE

## 2023-11-10 PROCEDURE — 27000221 HC OXYGEN, UP TO 24 HOURS

## 2023-11-10 PROCEDURE — 85025 COMPLETE CBC W/AUTO DIFF WBC: CPT | Performed by: STUDENT IN AN ORGANIZED HEALTH CARE EDUCATION/TRAINING PROGRAM

## 2023-11-10 PROCEDURE — 90935 HEMODIALYSIS ONE EVALUATION: CPT

## 2023-11-10 PROCEDURE — 99900035 HC TECH TIME PER 15 MIN (STAT)

## 2023-11-10 PROCEDURE — 94640 AIRWAY INHALATION TREATMENT: CPT

## 2023-11-10 RX ORDER — IPRATROPIUM BROMIDE AND ALBUTEROL SULFATE 2.5; .5 MG/3ML; MG/3ML
3 SOLUTION RESPIRATORY (INHALATION) EVERY 6 HOURS
Status: DISCONTINUED | OUTPATIENT
Start: 2023-11-10 | End: 2023-11-14 | Stop reason: HOSPADM

## 2023-11-10 RX ORDER — MUPIROCIN 20 MG/G
OINTMENT TOPICAL 2 TIMES DAILY
Status: DISCONTINUED | OUTPATIENT
Start: 2023-11-10 | End: 2023-11-14 | Stop reason: HOSPADM

## 2023-11-10 RX ORDER — ACETAMINOPHEN 325 MG/1
650 TABLET ORAL EVERY 6 HOURS PRN
Status: DISCONTINUED | OUTPATIENT
Start: 2023-11-10 | End: 2023-11-14 | Stop reason: HOSPADM

## 2023-11-10 RX ORDER — HEPARIN SODIUM 5000 [USP'U]/ML
5000 INJECTION, SOLUTION INTRAVENOUS; SUBCUTANEOUS
Status: CANCELLED | OUTPATIENT
Start: 2023-11-10

## 2023-11-10 RX ORDER — SODIUM CHLORIDE 9 MG/ML
INJECTION, SOLUTION INTRAVENOUS ONCE
Status: CANCELLED | OUTPATIENT
Start: 2023-11-10 | End: 2023-11-10

## 2023-11-10 RX ORDER — SODIUM CHLORIDE 9 MG/ML
INJECTION, SOLUTION INTRAVENOUS
Status: CANCELLED | OUTPATIENT
Start: 2023-11-10

## 2023-11-10 RX ADMIN — SOTALOL HYDROCHLORIDE 80 MG: 80 TABLET ORAL at 08:11

## 2023-11-10 RX ADMIN — DILTIAZEM HYDROCHLORIDE 120 MG: 120 CAPSULE, COATED, EXTENDED RELEASE ORAL at 08:11

## 2023-11-10 RX ADMIN — IPRATROPIUM BROMIDE AND ALBUTEROL SULFATE 3 ML: 2.5; .5 SOLUTION RESPIRATORY (INHALATION) at 07:11

## 2023-11-10 RX ADMIN — APIXABAN 2.5 MG: 2.5 TABLET, FILM COATED ORAL at 08:11

## 2023-11-10 RX ADMIN — LATANOPROST 1 DROP: 50 SOLUTION OPHTHALMIC at 08:11

## 2023-11-10 RX ADMIN — CALCIUM ACETATE 667 MG: 667 CAPSULE ORAL at 08:11

## 2023-11-10 RX ADMIN — DORZOLAMIDE HYDROCHLORIDE AND TIMOLOL MALEATE 1 DROP: 22.3; 6.8 SOLUTION/ DROPS OPHTHALMIC at 08:11

## 2023-11-10 RX ADMIN — MUPIROCIN 1 G: 20 OINTMENT TOPICAL at 08:11

## 2023-11-10 RX ADMIN — ACETAMINOPHEN 650 MG: 325 TABLET ORAL at 03:11

## 2023-11-10 RX ADMIN — PROMETHAZINE HYDROCHLORIDE 12.5 MG: 25 INJECTION INTRAMUSCULAR; INTRAVENOUS at 02:11

## 2023-11-10 RX ADMIN — CEFTRIAXONE SODIUM 1 G: 1 INJECTION, POWDER, FOR SOLUTION INTRAMUSCULAR; INTRAVENOUS at 08:11

## 2023-11-10 RX ADMIN — ATORVASTATIN CALCIUM 40 MG: 40 TABLET, FILM COATED ORAL at 08:11

## 2023-11-10 NOTE — HPI
Ms. Isaac is a 84 yo w/pmhx of ESRD on HD MWF, afib, HTN presenting w/nausea/vomiting. Reports vomiting x2 this AM after which her son called EMS. She also reports feeling SOB and having a non-productive cough this AM. Denies ever choking on food but does report she coughed up food this AM. Did not check her temperature this AM but reports her son thought she was feverish to EMS. Denies any new rashes. Does make urine but denies any hematuria or dysuria. Did have diarrhea today but reports it started after drinking the contrast for the CT.     In the ED, patient was febrile to 100.7. She was noted to be tachypnic to the high 30s by the ED. Ct noted multifocal PNA and bilateral hilar lymphadenopathy. She was given ctx/azithro.

## 2023-11-10 NOTE — NURSING
Received patient from ED via stretcher. AAO X 3 on room air in no apparent distress. Admission and assessment completed. Skin assessed. See 4eyes documentation. Denies pain.    Nurses Note -- 4 Eyes      11/9/2023   10:38 PM      Skin assessed during: Admit      [x] No Altered Skin Integrity Present    []Prevention Measures Documented      [] Yes- Altered Skin Integrity Present or Discovered   [] LDA Added if Not in Epic (Describe Wound)   [] New Altered Skin Integrity was Present on Admit and Documented in LDA   [] Wound Image Taken    Wound Care Consulted? No    Attending Nurse:  [id number]    Second RN/Staff Member:   [id number]

## 2023-11-10 NOTE — PROGRESS NOTES
Speech Pathology    Pt back on unit after dialysis.  Not able to perform swallow assessment due to patient vomiting after dialysis; nursing aware.  Will perform evaluation when pt able to participate.

## 2023-11-10 NOTE — PT/OT/SLP PROGRESS
Occupational Therapy      Patient Name:  Tyra Isaac   MRN:  [id number]    Patient not seen today secondary to Dialysis in AM; pt vomiting when attempted in PM.  Will follow-up 11/11.     11/10/2023

## 2023-11-10 NOTE — HOSPITAL COURSE
"11/10/2023  Ms Isaac is confused and easily distracted. She states that she has trouble breathing. She denies chest pain or numbness but is generally weak. " Otherwise I cannot say anything ".    11/11/2023  Ms Isaac arouses and speaks but will only say that she feels bad but cannot provide any more information. " I can breath now".    11/12/2023  Ms Isaac is much more awake alert and responsive.. She states that she has mild DUBOSE with movement    11/13/2023  Ms Isaac is still weak but improved respiratory status  She is considering SNF placement    11/14/2023  Ms Isaac is feeling well with no SOB and mild DUBOSE. She ia AA O X 3 and refuses SNF referral as recommended by PT.   ROS: see above otherwise negative X 8  PE: in no distress HEENT MURRAY EOM intact moist mucus membranes Neck supple no use of accessory muscles Lungs no crackles wheezes or rhonchi Heart S 1 S 2 RRR no murmur Abdomen BS+ nontender Ext: without CC or E pulses 1-2+ Skin no acute finding Neuro no acute sensory or motor deficit  "

## 2023-11-10 NOTE — SUBJECTIVE & OBJECTIVE
Past Medical History:   Diagnosis Date    A-fib     Anxiety     Depression     Disorder of kidney and ureter     Encephalopathy acute 1/1/2018    End stage kidney disease 6/17/2017    Gout     Hyperlipidemia     Hypertension     Moderate episode of recurrent major depressive disorder 1/17/2018    Nephropathy hypertensive, 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    Osteopenia of multiple sites 3/9/2018    Based upon bone density measurements. Patient also has chronic kidney disease.    Stroke 11/2016       Past Surgical History:   Procedure Laterality Date    ANGIOGRAM, CORONARY, WITH LEFT HEART CATHETERIZATION N/A 2/7/2022    Procedure: Angiogram, Coronary, with Left Heart Cath;  Surgeon: Gino Leal MD;  Location: [facility] CATH/EP LAB;  Service: Cardiology;  Laterality: N/A;    CARDIAC SURGERY      stents    EYE SURGERY      WRIST SURGERY         Review of patient's allergies indicates:   Allergen Reactions    Cyclobenzaprine     Fish containing products Hives    Peanut Other (See Comments)    Tramadol Itching       No current facility-administered medications on file prior to encounter.     Current Outpatient Medications on File Prior to Encounter   Medication Sig    atorvastatin (LIPITOR) 40 MG tablet Take 40 mg by mouth once daily.    b complex vitamins tablet Take 1 tablet by mouth once daily.    calcium acetate,phosphat bind, (PHOSLO) 667 mg tablet Take 667 mg by mouth 2 (two) times daily with meals.    diltiaZEM (CARDIZEM CD) 120 MG Cp24 Take 1 capsule (120 mg total) by mouth 2 (two) times a day.    docusate sodium (COLACE) 100 MG capsule Take 100 mg by mouth once daily.    dorzolamide-timolol 2-0.5% (COSOPT) 22.3-6.8 mg/mL ophthalmic solution Place 1 drop into both eyes 2 (two) times daily.    ELIQUIS 2.5 mg Tab Take 2.5 mg by mouth 2 (two) times daily.    latanoprost 0.005 %  ophthalmic solution Place 1 drop into both eyes every evening.    metoprolol tartrate (LOPRESSOR) 25 MG tablet Take 1 tablet (25 mg total) by mouth 2 (two) times daily.    ondansetron (ZOFRAN) 4 MG tablet Take 1 tablet (4 mg total) by mouth every 8 (eight) hours as needed for Nausea.    sotaloL (BETAPACE) 80 MG tablet Take 80 mg by mouth 2 (two) times daily. Take 1/2 tablet (40mg) BID     Family History       Problem Relation (Age of Onset)    Cancer Father    Heart disease Mother          Tobacco Use    Smoking status: Never    Smokeless tobacco: Never   Substance and Sexual Activity    Alcohol use: No    Drug use: No    Sexual activity: Not Currently     Review of Systems   Respiratory:  Positive for cough and shortness of breath.    Gastrointestinal:  Positive for diarrhea, nausea and vomiting.     Objective:     Vital Signs (Most Recent):  Temp: 99 °F (37.2 °C) (11/09/23 1920)  Pulse: 73 (11/09/23 2100)  Resp: 16 (11/09/23 1651)  BP: (!) 143/70 (11/09/23 2100)  SpO2: 95 % (11/09/23 2100) Vital Signs (24h Range):  Temp:  [99 °F (37.2 °C)-100.7 °F (38.2 °C)] 99 °F (37.2 °C)  Pulse:  [73-82] 73  Resp:  [16] 16  SpO2:  [95 %-97 %] 95 %  BP: (138-146)/(63-70) 143/70     Weight: 52.2 kg (115 lb)  Body mass index is 19.14 kg/m².     Physical Exam  Constitutional:       Appearance: Normal appearance.   HENT:      Head: Normocephalic and atraumatic.      Right Ear: External ear normal.      Left Ear: External ear normal.      Nose: Nose normal.      Mouth/Throat:      Mouth: Mucous membranes are moist.      Comments: Eating crackers  Eyes:      Extraocular Movements: Extraocular movements intact.   Cardiovascular:      Rate and Rhythm: Normal rate. Rhythm irregular.      Heart sounds: No murmur heard.  Pulmonary:      Effort: No respiratory distress.      Breath sounds: Rales (bases bilaterally) present. No wheezing.      Comments: On RA  Abdominal:      General: Abdomen is flat. There is no distension.      Tenderness:  There is no abdominal tenderness.   Musculoskeletal:      Right lower leg: No edema.      Left lower leg: No edema.   Skin:     General: Skin is warm.      Findings: No rash.   Neurological:      General: No focal deficit present.      Mental Status: She is alert and oriented to person, place, and time.   Psychiatric:         Mood and Affect: Mood normal.         Behavior: Behavior normal.                Significant Labs: All pertinent labs within the past 24 hours have been reviewed.    Significant Imaging: I have reviewed all pertinent imaging results/findings within the past 24 hours.

## 2023-11-10 NOTE — PLAN OF CARE
Unable to meet with patient at bedside so spoke with Raffi Isaac (Son) [phone] (Mobile) on phone to complete WARD. Explained Medicare Outpatient Observation Notice (MOON). MOON form scanned into media manager.        11/10/23 9362   WARD Message   Medicare Outpatient and Observation Notification regarding financial responsibility Explained to patient/caregiver   Date WARD was signed 11/10/23   Time WARD was signed [id number]

## 2023-11-10 NOTE — PT/OT/SLP PROGRESS
Physical Therapy      Patient Name:  Tyra Isaac   MRN:  [id number]    Patient not seen today secondary to Dialysis. Will follow-up 11/11/2023.

## 2023-11-10 NOTE — PLAN OF CARE
Per chart review/search, identified patient was with [facility]  from previous discharge. Spoke with rep at [facility]  [phone] who states patient was discharged from  in September 2023 but they are willing to accept patient back if needed.

## 2023-11-10 NOTE — PLAN OF CARE
Problem: Adult Inpatient Plan of Care  Goal: Plan of Care Review  Outcome: Ongoing, Progressing  Goal: Optimal Comfort and Wellbeing  Outcome: Ongoing, Progressing     Problem: Respiratory Compromise (Pneumonia)  Goal: Effective Oxygenation and Ventilation  Outcome: Ongoing, Progressing     Problem: Fall Injury Risk  Goal: Absence of Fall and Fall-Related Injury  Outcome: Ongoing, Progressing

## 2023-11-10 NOTE — SUBJECTIVE & OBJECTIVE
Interval History: Ms Isaac has acute respiratory failure with hypoxia.     Review of Systems   Constitutional:  Positive for activity change, diaphoresis and fatigue. Negative for chills and fever.   HENT: Negative.     Eyes: Negative.    Respiratory:  Positive for cough, chest tightness and shortness of breath.    Cardiovascular: Negative.    Gastrointestinal:  Positive for abdominal distention. Negative for abdominal pain.   Endocrine: Positive for cold intolerance.   Genitourinary: Negative.    Musculoskeletal:  Positive for arthralgias, gait problem and myalgias.   Skin: Negative.    Allergic/Immunologic: Positive for immunocompromised state.   Neurological:  Positive for weakness.   Hematological:  Bruises/bleeds easily.   Psychiatric/Behavioral:  Positive for confusion and decreased concentration. The patient is nervous/anxious.      Objective:     Vital Signs (Most Recent):  Temp: 99.3 °F (37.4 °C) (11/10/23 1713)  Pulse: 65 (11/10/23 1610)  Resp: 16 (11/10/23 1210)  BP: (!) 144/70 (11/10/23 1610)  SpO2: 96 % (11/10/23 1610) Vital Signs (24h Range):  Temp:  [98 °F (36.7 °C)-99.6 °F (37.6 °C)] 99.3 °F (37.4 °C)  Pulse:  [50-78] 65  Resp:  [16-20] 16  SpO2:  [95 %-98 %] 96 %  BP: (122-158)/(54-72) 144/70     Weight: 52.2 kg (115 lb 1.3 oz)  Body mass index is 19.15 kg/m².    Intake/Output Summary (Last 24 hours) at 11/10/2023 1750  Last data filed at 11/10/2023 1451  Gross per 24 hour   Intake 1095 ml   Output 3360 ml   Net -2265 ml         Physical Exam  Vitals and nursing note reviewed.   Constitutional:       General: She is not in acute distress.  HENT:      Head: Normocephalic and atraumatic.      Nose: Nose normal.      Mouth/Throat:      Mouth: Mucous membranes are moist.   Eyes:      Extraocular Movements: Extraocular movements intact.      Pupils: Pupils are equal, round, and reactive to light.   Cardiovascular:      Rate and Rhythm: Normal rate and regular rhythm.   Pulmonary:      Effort:  Pulmonary effort is normal.      Breath sounds: Normal breath sounds.   Abdominal:      General: Bowel sounds are normal. There is no distension.      Tenderness: There is no abdominal tenderness. There is no guarding or rebound.   Musculoskeletal:         General: Normal range of motion.      Cervical back: Normal range of motion and neck supple.   Skin:     General: Skin is warm.   Neurological:      Mental Status: She is alert. She is disoriented.      Motor: Weakness present.   Psychiatric:      Comments: Disoriented             Significant Labs: All pertinent labs within the past 24 hours have been reviewed.  Recent Lab Results  (Last 5 results in the past 24 hours)        11/10/23  1529   11/10/23  1518   11/10/23  0509   11/09/23  2214   11/09/23  2207        Procalcitonin     3.895  Comment: Procalcitonin Result Interpretation:    <=0.50 ng/mL  Systemic infection (sepsis) is not likely. Local bacterial infection   is   possible.    >0.50 and <= 2.00 ng/mL  Systemic infection (sepsis) is possible, but other conditions are   also known   to elevate procalcitonin.     >2.00 ng/mL  Systemic infection (sepsis) is likely unless other causes are known.    >=10.00 ng/mL  Important systemic inflammatory response, almost exclusively due to   severe   bacterial sepsis or septic shock.             Allens Test Pass   Pass             Anion Gap     11           Baso #     0.02           Basophil %     0.3           Blood Culture, Routine               Site LR   LR             BUN     49           Calcium     7.8           Chloride     94           CO2     31           Creatinine     8.1           [facility] Room Air   Room Air             Differential Method     Automated           eGFR     4.5           Eos #     0.3           Eosinophil %     5.1           Glucose     72           Gran # (ANC)     3.7           Gran %     56.9           Hematocrit     30.9           Hemoglobin     10.0           Immature Grans (Abs)      0.02  Comment: Mild elevation in immature granulocytes is non specific and   can be seen in a variety of conditions including stress response,   acute inflammation, trauma and pregnancy. Correlation with other   laboratory and clinical findings is essential.             Immature Granulocytes     0.3           INR       1.1  Comment: Coumadin Therapy:  2.0 - 3.0 for INR for all indicators except mechanical heart valves  and antiphospholipid syndromes which should use 2.5 - 3.5.           Lymph #     1.2           Lymph %     18.0           MCH     31.2           MCHC     32.4           MCV     96           Mono #     1.3           Mono %     19.4           MPV     10.2           MRSA SCREEN BY PCR         Negative       nRBC     0           Platelet Count     181           POC BE -2   -3             POC HCO3 22.6   22.4             POC PCO2 37.2   36.5             POC PH 7.391   7.395             POC PO2 53   54             POC SATURATED O2 87   88             POC TCO2 24   23             Potassium     3.6           Protime       11.9         RBC     3.21           RDW     15.2           Sample ARTERIAL   ARTERIAL             Sodium     136           WBC     6.50                                  Significant Imaging: I have reviewed all pertinent imaging results/findings within the past 24 hours.

## 2023-11-10 NOTE — PROGRESS NOTES
Consent an Hep b status verified, removed 2850uf net, post thrill and bruit noted, no issues with access. Patient became nauseated at end of treatment. Educated patient on HD treatment. Report given to AMY Miranda     11/10/23 1210   Handoff Report   Received From Mary Carmen   Given To Ruth   Vital Signs   Temp 98.3 °F (36.8 °C)   Temp Source Oral   Pulse (!) 55   Heart Rate Source Monitor   Resp 16   SpO2 98 %   Pulse Oximetry Type Intermittent   Device (Oxygen Therapy) room air   /67   BP Location Left arm   BP Method Automatic   Patient Position Lying   Assessments (Pre/Post)   Consent Obtained yes   Safety vein preservation armband present   Date Hepatitis Profile Obtained 03/06/23   Blood Liters Processed (BLP) 64.1   Transport Modality bed   Level of Consciousness (AVPU) alert   Dialyzer Clearance mildly streaked   Pain   Preferred Pain Scale number (Numeric Rating Pain Scale)   Comfort/Acceptable Pain Level 0   Pain Rating (0-10): Rest 0   Pain Rating (0-10): Activity 0   Pain Management Interventions quiet environment facilitated;position adjusted;pillow support provided   Pain/Comfort Interventions   Fever Reduction/Comfort Measures lightweight clothing;lightweight bedding   Pre-Hemodialysis Assessment   Additional Dialysis Information Needed Yes   Patient Status Departed   Treatment Consent Verified Yes   Treatment Status Completed        Hemodialysis AV Fistula Right upper arm   No Placement Date or Time found.   Present Prior to Hospital Arrival?: Yes  Location: Right upper arm   Site Assessment Clean;Intact;Dry   Patency Present;Thrill;Bruit   Status Deaccessed   Dressing Intervention First dressing   Dressing Status Clean;Dry;Intact   Site Condition No complications   Dressing Gauze   Post-Hemodialysis Assessment   Rinseback Volume (mL) 250 mL   Blood Volume Processed (Liters) 64.1 L   Dialyzer Clearance Lightly streaked   Duration of Treatment 180 minutes   Additional Fluid Intake (mL) 600 mL    Total UF (mL) 3360 mL   Net Fluid Removal 2850   Patient Response to Treatment see notes   Post-Treatment Weight 49.5 kg (109 lb 2 oz)   Treatment Weight Change -2.7   Arterial bleeding stop time (min) 5 min   Venous bleeding stop time (min) 5 min   Post-Hemodialysis Comments stable

## 2023-11-10 NOTE — ASSESSMENT & PLAN NOTE
CT w/bilateral PNA   -speech consult to evaluate for aspiration PNA  -s/p azithro/ctx in ED   -hold further azithro for now 2/2 prolonged QTc  -cont ctx   -add vanc (ESRD - MRSA risk factor)  -MRSA nares   -procal in AM   -blood cultures   -UA to complete infectious workup   -PT/OT; pt lives alone

## 2023-11-10 NOTE — PLAN OF CARE
Unable to meet with patient at bedside so spoke with  Raffi Isaac (Son) [phone] (Mobile)  on phone to complete initial assessment. Patient / family reports patient DOES have a living will and Raffi Isaac (Son) [phone] (Mobile) is medical POA. Patient lives alone but son Raffi is staying with patient for a short time. Patient takes HD [facility] at [facility] [place]  Initial Discharge Assessment       Primary Care Provider: Kalpesh Goel MD    Admission Diagnosis: Vomiting [R11.10]    Admission Date: 11/9/2023  Expected Discharge Date: 11/12/2023    Transition of Care Barriers: (P) None    Payor: [employer] MEDICARE / Plan: [employer] MEDICARE HMO / Product Type: Capitation /     Extended Emergency Contact Information  Primary Emergency Contact: Raffi Isaac  Address: [address] [place] [address] [id number] [place] [address]  Home Phone: [phone]  Mobile Phone: [phone]  Relation: Son   needed? No    Discharge Plan A: (P) Home Health  Discharge Plan B: (P) Home Health      [id number] - [place], [place] - [address] [facility].  [address] [place] [address]  Phone: [phone] Fax: [phone]      Initial Assessment (most recent)       Adult Discharge Assessment - 11/10/23 1522          Discharge Assessment    Assessment Type Discharge Planning Assessment (P)      Confirmed/corrected address, phone number and insurance Yes (P)      Confirmed Demographics Correct on Facesheet (P)      Source of Information family (P)      Does patient/caregiver understand observation status Yes (P)      Communicated ILAN with patient/caregiver No (P)      Reason For Admission pneumonia (P)      People in Home alone (P)      Facility Arrived From: home (P)      Do you expect to return to your current living situation? Yes (P)      Do you have help at home or someone to help you manage your care at home? Yes (P)      Who are your  caregiver(s) and their phone number(s)? Raffi Isaac (Son)   [phone] (Mobile) (P)      Current cognitive status: Unable to Assess (P)      Walking or Climbing Stairs ambulation difficulty, requires equipment (P)      Equipment Currently Used at Home rollator (P)      Readmission within 30 days? No (P)      Patient currently being followed by outpatient case management? No (P)      Do you currently have service(s) that help you manage your care at home? Yes (P)      Name and Contact number of agency unknown home health agency - son to find out name and let us know (P)      Is the pt/caregiver preference to resume services with current agency Yes (P)      Do you have prescription coverage? Yes (P)      Coverage [employer] (P)      Who is going to help you get home at discharge? Raffi Isaac (Son)   [phone] (Mobile) (P)      How do you get to doctors appointments? family or friend will provide (P)      Are you on dialysis? Yes (P)      Dialysis Name and Scheduled days [facility] [address], MWF schedule (P)      Do you take coumadin? No (P)      DME Needed Upon Discharge  none (P)      Discharge Plan discussed with: Adult children (P)      Transition of Care Barriers None (P)      Discharge Plan A Home Health (P)      Discharge Plan B Home Health (P)

## 2023-11-10 NOTE — PROGRESS NOTES
Speech Pathology    Received orders for evaluation pertaining to swallow.  Chart reviewed.  Went to unit.  Pt off unit for dialysis.  Spoke with nursing.  Will evaluate when pt available.

## 2023-11-10 NOTE — ASSESSMENT & PLAN NOTE
HD MVF  -consult nephrology- follows w/Dr. Maynard      Patient requests all Lab and Radiology Results on their Discharge Instructions

## 2023-11-10 NOTE — H&P
[facility] - Emergency Dept  Hospital Medicine  History & Physical    Patient Name: Tyra Isaac  MRN: [id number]  Patient Class: OP- Observation  Admission Date: 11/9/2023  Attending Physician: Alexander Jo MD   Primary Care Provider: Kalpesh Goel MD         Patient information was obtained from patient and ER records.     Subjective:     Principal Problem:Pneumonia    Chief Complaint:   Chief Complaint   Patient presents with    Shortness of Breath     Shortness of breat started this AM, pt had dialysis yx, pt not sure how many L removed.         HPI: Ms. Isaac is a 82 yo w/pmhx of ESRD on HD MWF, afib, HTN presenting w/nausea/vomiting. Reports vomiting x2 this AM after which her son called EMS. She also reports feeling SOB and having a non-productive cough this AM. Denies ever choking on food but does report she coughed up food this AM. Did not check her temperature this AM but reports her son thought she was feverish to EMS. Denies any new rashes. Does make urine but denies any hematuria or dysuria. Did have diarrhea today but reports it started after drinking the contrast for the CT.     In the ED, patient was febrile to 100.7. She was noted to be tachypnic to the high 30s by the ED. Ct noted multifocal PNA and bilateral hilar lymphadenopathy. She was given ctx/azithro.       Past Medical History:   Diagnosis Date    A-fib     Anxiety     Depression     Disorder of kidney and ureter     Encephalopathy acute 1/1/2018    End stage kidney disease 6/17/2017    Gout     Hyperlipidemia     Hypertension     Moderate episode of recurrent major depressive disorder 1/17/2018    Nephropathy hypertensive, stage 5 chronic kidney disease or end stage renal disease 6/17/2017    Obstructive pattern present on pulmonary function testing 7/28/2021    Shows moderate obstruction.    Osteopenia of multiple sites 3/9/2018    Based upon bone density measurements. Patient also has chronic kidney  disease.    Stroke 11/2016       Past Surgical History:   Procedure Laterality Date    ANGIOGRAM, CORONARY, WITH LEFT HEART CATHETERIZATION N/A 2/7/2022    Procedure: Angiogram, Coronary, with Left Heart Cath;  Surgeon: Gino Leal MD;  Location: [facility] CATH/EP LAB;  Service: Cardiology;  Laterality: N/A;    CARDIAC SURGERY      stents    EYE SURGERY      WRIST SURGERY         Review of patient's allergies indicates:   Allergen Reactions    Cyclobenzaprine     Fish containing products Hives    Peanut Other (See Comments)    Tramadol Itching       No current facility-administered medications on file prior to encounter.     Current Outpatient Medications on File Prior to Encounter   Medication Sig    atorvastatin (LIPITOR) 40 MG tablet Take 40 mg by mouth once daily.    b complex vitamins tablet Take 1 tablet by mouth once daily.    calcium acetate,phosphat bind, (PHOSLO) 667 mg tablet Take 667 mg by mouth 2 (two) times daily with meals.    diltiaZEM (CARDIZEM CD) 120 MG Cp24 Take 1 capsule (120 mg total) by mouth 2 (two) times a day.    docusate sodium (COLACE) 100 MG capsule Take 100 mg by mouth once daily.    dorzolamide-timolol 2-0.5% (COSOPT) 22.3-6.8 mg/mL ophthalmic solution Place 1 drop into both eyes 2 (two) times daily.    ELIQUIS 2.5 mg Tab Take 2.5 mg by mouth 2 (two) times daily.    latanoprost 0.005 % ophthalmic solution Place 1 drop into both eyes every evening.    metoprolol tartrate (LOPRESSOR) 25 MG tablet Take 1 tablet (25 mg total) by mouth 2 (two) times daily.    ondansetron (ZOFRAN) 4 MG tablet Take 1 tablet (4 mg total) by mouth every 8 (eight) hours as needed for Nausea.    sotaloL (BETAPACE) 80 MG tablet Take 80 mg by mouth 2 (two) times daily. Take 1/2 tablet (40mg) BID     Family History       Problem Relation (Age of Onset)    Cancer Father    Heart disease Mother          Tobacco Use    Smoking status: Never    Smokeless tobacco: Never   Substance and Sexual  Activity    Alcohol use: No    Drug use: No    Sexual activity: Not Currently     Review of Systems   Respiratory:  Positive for cough and shortness of breath.    Gastrointestinal:  Positive for diarrhea, nausea and vomiting.     Objective:     Vital Signs (Most Recent):  Temp: 99 °F (37.2 °C) (11/09/23 1920)  Pulse: 73 (11/09/23 2100)  Resp: 16 (11/09/23 1651)  BP: (!) 143/70 (11/09/23 2100)  SpO2: 95 % (11/09/23 2100) Vital Signs (24h Range):  Temp:  [99 °F (37.2 °C)-100.7 °F (38.2 °C)] 99 °F (37.2 °C)  Pulse:  [73-82] 73  Resp:  [16] 16  SpO2:  [95 %-97 %] 95 %  BP: (138-146)/(63-70) 143/70     Weight: 52.2 kg (115 lb)  Body mass index is 19.14 kg/m².     Physical Exam  Constitutional:       Appearance: Normal appearance.   HENT:      Head: Normocephalic and atraumatic.      Right Ear: External ear normal.      Left Ear: External ear normal.      Nose: Nose normal.      Mouth/Throat:      Mouth: Mucous membranes are moist.      Comments: Eating crackers  Eyes:      Extraocular Movements: Extraocular movements intact.   Cardiovascular:      Rate and Rhythm: Normal rate. Rhythm irregular.      Heart sounds: No murmur heard.  Pulmonary:      Effort: No respiratory distress.      Breath sounds: Rales (bases bilaterally) present. No wheezing.      Comments: On RA  Abdominal:      General: Abdomen is flat. There is no distension.      Tenderness: There is no abdominal tenderness.   Musculoskeletal:      Right lower leg: No edema.      Left lower leg: No edema.   Skin:     General: Skin is warm.      Findings: No rash.   Neurological:      General: No focal deficit present.      Mental Status: She is alert and oriented to person, place, and time.   Psychiatric:         Mood and Affect: Mood normal.         Behavior: Behavior normal.                Significant Labs: All pertinent labs within the past 24 hours have been reviewed.    Significant Imaging: I have reviewed all pertinent imaging results/findings within the  past 24 hours.    Assessment/Plan:     * Pneumonia  CT w/bilateral PNA   -speech consult to evaluate for aspiration PNA  -s/p azithro/ctx in ED   -hold further azithro for now 2/2 prolonged QTc  -cont ctx   -add vanc (ESRD - MRSA risk factor)  -MRSA nares   -procal in AM   -blood cultures   -UA to complete infectious workup   -PT/OT; pt lives alone       Nausea  -scopolamine patch   -avoid QTc prolonging agents   -recheck EKG in AM       Anemia of chronic disease  Stable   ctm     ESRD on HD via are right upper extremity AVF MWF  HD MVF  -consult nephrology- follows w/Dr. Maynard       Paroxysmal atrial fibrillation  -check INR (pt unsure if she takes eliquis or warfarin)  -continue eliquis for now   -sotalol   -re-check EKG in AM (QtC >500 and may need to stop sotalol)  -diltizem   -also had metoprolol listed on MAR but upon pharmacy tech review, last filled in July 2023 - hold for now         VTE Risk Mitigation (From admission, onward)         Ordered     apixaban tablet 2.5 mg  2 times daily         11/09/23 2139     IP VTE HIGH RISK PATIENT  Once         11/09/23 2139     Place sequential compression device  Until discontinued         11/09/23 2139                   On 11/09/2023, patient should be placed in hospital observation services under my care.            Alexander Jo MD  Department of Hospital Medicine  [facility] - Emergency Dept

## 2023-11-10 NOTE — PROGRESS NOTES
"Pharmacokinetic Initial Assessment: IV Vancomycin    Assessment/Plan:    Initiate intravenous vancomycin with loading dose of 1250 mg once with subsequent doses when random concentrations are less than 20 mcg/mL  Desired empiric serum trough concentration is 10 to 15 mcg/mL  Draw vancomycin random level on 11/10 at 2200.  Pharmacy will continue to follow and monitor vancomycin.      Please contact pharmacy at extension [phone] with any questions regarding this assessment.     Thank you for the consult,   Jayjay Dahiana       Patient brief summary:  Tyra Isaac is a 83 y.o. female initiated on antimicrobial therapy with IV Vancomycin for treatment of suspected lower respiratory infection    Drug Allergies:   Review of patient's allergies indicates:   Allergen Reactions    Cyclobenzaprine     Fish containing products Hives    Peanut Other (See Comments)    Tramadol Itching       Actual Body Weight:   52.2 kg    Renal Function:   Estimated Creatinine Clearance: 4.5 mL/min (A) (based on SCr of 7.8 mg/dL (H)).,     CBC (last 72 hours):  Recent Labs   Lab Result Units 11/09/23  1705   WBC K/uL 7.38   Hemoglobin g/dL 10.2*   Hematocrit % 32.2*   Platelets K/uL 186   Gran % % 70.6   Lymph % % 11.0*   Mono % % 17.1*   Eosinophil % % 0.7   Basophil % % 0.3   Differential Method  Automated       Metabolic Panel (last 72 hours):  Recent Labs   Lab Result Units 11/09/23  1705 11/09/23  2028   Sodium mmol/L 143  --    Potassium mmol/L 3.8  --    Chloride mmol/L 99  --    CO2 mmol/L 32*  --    Glucose mg/dL 106  --    Glucose, UA   --  Trace*   BUN mg/dL 44*  --    Creatinine mg/dL 7.8*  --    Albumin g/dL 3.7  --    Total Bilirubin mg/dL 1.1*  --    Alkaline Phosphatase U/L 54*  --    AST U/L 25  --    ALT U/L 20  --        Drug levels (last 3 results):  No results for input(s): "VANCOMYCINRA", "VANCORANDOM", "VANCOMYCINPE", "VANCOPEAK", "VANCOMYCINTR", "VANCOTROUGH" in the last 72 hours.    Microbiologic Results:  Microbiology " Results (last 7 days)       Procedure Component Value Units Date/Time    MRSA Screen by PCR [[id number]]     Order Status: No result Specimen: Nasopharyngeal Swab from Nasal     Blood Culture #1 **CANNOT BE ORDERED STAT** [[id number]] Collected: 11/09/23 2121    Order Status: Sent Specimen: Blood Updated: 11/09/23 2122    Blood Culture #2 **CANNOT BE ORDERED STAT** [[id number]]     Order Status: Sent Specimen: Blood

## 2023-11-10 NOTE — ASSESSMENT & PLAN NOTE
-check INR (pt unsure if she takes eliquis or warfarin)  -continue eliquis for now   -sotalol   -re-check EKG in AM (QtC >500 and may need to stop sotalol)  -diltizem   -also had metoprolol listed on MAR but upon pharmacy tech review, last filled in July 2023 - hold for now

## 2023-11-10 NOTE — CONSULTS
Nephrology Consult Note        Patient Name: Tyra Isaac  MRN: [id number]    Patient Class: OP- Observation   Admission Date: 11/9/2023  Length of Stay: 0 days  Date of Service: 11/10/2023    Attending Physician: Humble Yee MD  Primary Care Provider: Kalpesh Goel MD    Reason for Consult: esrd    SUBJECTIVE:     HPI: 82 yo w/pmhx of ESRD on HD MWF by Dr. Valero, afib, HTN presenting w/nausea/vomiting. Reports vomiting x2 this AM after which her son called EMS. She also reports feeling SOB and having a non-productive cough this AM. Denies ever choking on food but does report she coughed up food this AM. Did not check her temperature this AM but reports her son thought she was feverish to EMS. Denies any new rashes. Does make urine but denies any hematuria or dysuria. Did have diarrhea today but reports it started after drinking the contrast for the CT.      In the ED, patient was febrile to 100.7. She was noted to be tachypnic to the high 30s by the ED. Ct noted multifocal PNA and bilateral hilar lymphadenopathy. She was given ctx/azithro.     Past Medical History:   Diagnosis Date    A-fib     Anxiety     Depression     Disorder of kidney and ureter     Encephalopathy acute 1/1/2018    End stage kidney disease 6/17/2017    Gout     Hyperlipidemia     Hypertension     Moderate episode of recurrent major depressive disorder 1/17/2018    Nephropathy hypertensive, 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    Osteopenia of multiple sites 3/9/2018    Based upon bone density measurements. Patient also has chronic kidney disease.    Stroke 11/2016     Past Surgical History:   Procedure Laterality Date    ANGIOGRAM, CORONARY, WITH LEFT HEART CATHETERIZATION N/A 2/7/2022    Procedure: Angiogram, Coronary, with Left Heart Cath;  Surgeon: Gino Leal MD;  Location: [facility] CATH/EP LAB;   Service: Cardiology;  Laterality: N/A;    CARDIAC SURGERY      stents    EYE SURGERY      WRIST SURGERY       Family History   Problem Relation Age of Onset    Heart disease Mother     Cancer Father      Social History     Tobacco Use    Smoking status: Never    Smokeless tobacco: Never   Substance Use Topics    Alcohol use: No    Drug use: No       Review of patient's allergies indicates:   Allergen Reactions    Cyclobenzaprine     Fish containing products Hives    Peanut Other (See Comments)    Tramadol Itching       Outpatient meds:  No current facility-administered medications on file prior to encounter.     Current Outpatient Medications on File Prior to Encounter   Medication Sig Dispense Refill    atorvastatin (LIPITOR) 40 MG tablet Take 40 mg by mouth once daily.      b complex vitamins tablet Take 1 tablet by mouth once daily.      calcium acetate,phosphat bind, (PHOSLO) 667 mg tablet Take 667 mg by mouth 2 (two) times daily with meals.      diltiaZEM (CARDIZEM CD) 120 MG Cp24 Take 1 capsule (120 mg total) by mouth 2 (two) times a day.      docusate sodium (COLACE) 100 MG capsule Take 100 mg by mouth once daily.      dorzolamide-timolol 2-0.5% (COSOPT) 22.3-6.8 mg/mL ophthalmic solution Place 1 drop into both eyes 2 (two) times daily.      ELIQUIS 2.5 mg Tab Take 2.5 mg by mouth 2 (two) times daily.      latanoprost 0.005 % ophthalmic solution Place 1 drop into both eyes every evening.      metoprolol tartrate (LOPRESSOR) 25 MG tablet Take 1 tablet (25 mg total) by mouth 2 (two) times daily. 60 tablet 11    ondansetron (ZOFRAN) 4 MG tablet Take 1 tablet (4 mg total) by mouth every 8 (eight) hours as needed for Nausea. 15 tablet 0    sotaloL (BETAPACE) 80 MG tablet Take 80 mg by mouth 2 (two) times daily. Take 1/2 tablet (40mg) BID         Scheduled meds:   apixaban  2.5 mg Oral BID    atorvastatin  40 mg Oral Daily    calcium acetate(phosphat bind)  667 mg Oral BID WM    cefTRIAXone (ROCEPHIN) IVPB  1 g  Intravenous Q24H    diltiaZEM  120 mg Oral BID    dorzolamide-timolol 2-0.5%  1 drop Both Eyes BID    latanoprost  1 drop Both Eyes QHS    mupirocin   Nasal BID    scopolamine  1 patch Transdermal Q3 Days    sotaloL  80 mg Oral BID       Infusions:      PRN meds:  melatonin, sodium chloride 0.9%, Pharmacy to dose Vancomycin consult **AND** vancomycin - pharmacy to dose    Review of Systems:  Constitutional:  Negative for chills, fever, malaise/fatigue and weight loss.   HENT:  Negative for hearing loss and nosebleeds.    Eyes:  Negative for blurred vision, double vision and photophobia.   Respiratory:  Negative for cough, shortness of breath and wheezing.    Cardiovascular:  Negative for chest pain, palpitations and leg swelling.   Gastrointestinal:  Negative for abdominal pain, constipation, diarrhea, heartburn, nausea and vomiting.   Genitourinary:  Negative for dysuria, frequency and urgency.   Musculoskeletal:  Negative for falls, joint pain and myalgias.   Skin:  Negative for itching and rash.   Neurological:  Negative for dizziness, speech change, focal weakness, loss of consciousness and headaches.   Endo/Heme/Allergies:  Does not bruise/bleed easily.   Psychiatric/Behavioral:  Negative for depression and substance abuse. The patient is not nervous/anxious.      OBJECTIVE:     Vital Signs and IO:  Temp:  [98 °F (36.7 °C)-100.7 °F (38.2 °C)]   Pulse:  [59-82]   Resp:  [16-20]   BP: (129-158)/(60-70)   SpO2:  [95 %-98 %]   I/O last 3 completed shifts:  In: 370 [P.O.:120; I.V.:250]  Out: -   Wt Readings from Last 5 Encounters:   11/09/23 52.2 kg (115 lb 1.3 oz)   07/22/23 52.2 kg (115 lb)   07/18/23 51.5 kg (113 lb 9.6 oz)   06/28/23 49.9 kg (110 lb)   03/31/23 51.3 kg (113 lb)     Body mass index is 19.15 kg/m².    Physical Exam  Constitutional:       General: Patient is not in acute distress.     Appearance: Patient is well-developed. She is not diaphoretic.   HENT:      Head: Normocephalic and atraumatic.      " Mouth/Throat: Mucous membranes are moist.   Eyes:      General: No scleral icterus.     Pupils: Pupils are equal, round, and reactive to light.   Cardiovascular:      Rate and Rhythm: Normal rate and regular rhythm.   Pulmonary:      Effort: Pulmonary effort is normal. No respiratory distress.      Breath sounds: No stridor.   Abdominal:      General: There is no distension.      Palpations: Abdomen is soft.   Musculoskeletal:         General: No deformity. Normal range of motion.      Cervical back: Neck supple.   Skin:     General: Skin is warm and dry.      Findings: No rash present. No erythema.   Neurological:      Mental Status: Patient is alert and oriented to person, place, and time.      Cranial Nerves: No cranial nerve deficit.   Psychiatric:         Behavior: Behavior normal.     Laboratory:  Recent Labs   Lab 11/09/23  1705 11/10/23  0509    136   K 3.8 3.6   CL 99 94*   CO2 32* 31*   BUN 44* 49*   CREATININE 7.8* 8.1*    72       Recent Labs   Lab 11/09/23  1705 11/10/23  0509   CALCIUM 8.2* 7.8*   ALBUMIN 3.7  --              No results for input(s): "POCTGLUCOSE" in the last 168 hours.    Recent Labs   Lab 01/26/22  1525   Hemoglobin A1C 5.3       Recent Labs   Lab 11/09/23  1705 11/10/23  0509   WBC 7.38 6.50   HGB 10.2* 10.0*   HCT 32.2* 30.9*    181   MCV 98 96   MCHC 31.7* 32.4   MONO 17.1*  1.3* 19.4*  1.3*   EOSINOPHIL 0.7 5.1       Recent Labs   Lab 11/09/23  1705   BILITOT 1.1*   PROT 6.6   ALBUMIN 3.7   ALKPHOS 54*   ALT 20   AST 25       Recent Labs   Lab 02/02/22  1605 03/31/23  1046 11/09/23  2028   Color, UA Yellow Yellow Yellow   Appearance, UA Clear Clear Clear   pH, UA >8.0 A 8.0 >8.0 A   Specific [place], UA 1.005 1.010 1.015   Protein, UA 1+ A 1+ A 2+ A   Glucose, UA Negative Negative Trace A   Ketones, UA Negative Negative Negative   Urobilinogen, UA Negative Negative 2.0-3.0 A   Bilirubin (UA) Negative Negative Negative   Occult Blood UA Negative Negative " Trace A   Nitrite, UA Negative Negative Negative   RBC, UA 1 0 1   WBC, UA 1 0 0   Bacteria Negative Negative Negative   Hyaline Casts, UA 3 A 1 3 A             Microbiology Results (last 7 days)       Procedure Component Value Units Date/Time    Blood Culture #1 **CANNOT BE ORDERED STAT** [[id number]] Collected: 11/09/23 2121    Order Status: Completed Specimen: Blood Updated: 11/10/23 0517     Blood Culture, Routine No Growth to date    Blood Culture #2 **CANNOT BE ORDERED STAT** [[id number]] Collected: 11/09/23 2201    Order Status: Completed Specimen: Blood Updated: 11/10/23 0517     Blood Culture, Routine No Growth to date    MRSA Screen by PCR [[id number]] Collected: 11/09/23 2207    Order Status: Completed Specimen: Nasopharyngeal Swab from Nasal Updated: 11/09/23 2343     MRSA SCREEN BY PCR Negative            ASSESSMENT/PLAN:     ESRD on HD MWF via [facility]  Next HD per schedule.  Continue current dialysis prescription.  Renal diet - low K, low phos.  No IVs or BP checks on access and/or non-dominant arm.    Anemia of CKD  Hgb and HCT are acceptable. Monitor for now.  Will provide BRAYAN and/or IV iron PRN.    MBD / Secondary HPT  Ca, Phos, PTH and vitamin D levels are acceptable.   Phos binders, vitamin D and analogues, calcimimetics will be given as needed.    HTN  BP seem controlled.   Tolerate asymptomatic HTN up to -160.  Continue home meds.  Low sodium diet.    PNA  Management per primary team.    Thank you for allowing us to participate in the care of your patient!   We will follow the patient and provide recommendations as needed.    Patient care time was spent personally by me on the following activities:     Obtaining a history.  Examination of patient.  Providing medical care at the patients bedside.  Developing a treatment plan with patient or surrogate and bedside caregivers.  Ordering and reviewing laboratory studies, radiographic studies, pulse oximetry.  Ordering and performing treatments  and interventions.  Evaluation of patient's response to treatment.  Discussions with consultants while on the unit and immediately available to the patient.  Re-evaluation of the patient's condition.  Documentation in the medical record.     Raleigh Yee MD    [facility]  [address] [place] [id number]    [phone] - tel  [phone] - fax    11/10/2023

## 2023-11-11 LAB
ANION GAP SERPL CALC-SCNC: 12 MMOL/L (ref 8–16)
BASOPHILS # BLD AUTO: 0.02 K/UL (ref 0–0.2)
BASOPHILS NFR BLD: 0.2 % (ref 0–1.9)
BUN SERPL-MCNC: 32 MG/DL (ref 8–23)
CALCIUM SERPL-MCNC: 8.8 MG/DL (ref 8.7–10.5)
CHLORIDE SERPL-SCNC: 103 MMOL/L (ref 95–110)
CO2 SERPL-SCNC: 23 MMOL/L (ref 23–29)
CREAT SERPL-MCNC: 6.3 MG/DL (ref 0.5–1.4)
DIFFERENTIAL METHOD: ABNORMAL
EOSINOPHIL # BLD AUTO: 0 K/UL (ref 0–0.5)
EOSINOPHIL NFR BLD: 0.3 % (ref 0–8)
ERYTHROCYTE [DISTWIDTH] IN BLOOD BY AUTOMATED COUNT: 15.5 % (ref 11.5–14.5)
EST. GFR  (NO RACE VARIABLE): 6.1 ML/MIN/1.73 M^2
GLUCOSE SERPL-MCNC: 73 MG/DL (ref 70–110)
HCT VFR BLD AUTO: 32.2 % (ref 37–48.5)
HGB BLD-MCNC: 10.2 G/DL (ref 12–16)
IMM GRANULOCYTES # BLD AUTO: 0.03 K/UL (ref 0–0.04)
IMM GRANULOCYTES NFR BLD AUTO: 0.3 % (ref 0–0.5)
LYMPHOCYTES # BLD AUTO: 0.9 K/UL (ref 1–4.8)
LYMPHOCYTES NFR BLD: 8.1 % (ref 18–48)
MCH RBC QN AUTO: 30.7 PG (ref 27–31)
MCHC RBC AUTO-ENTMCNC: 31.7 G/DL (ref 32–36)
MCV RBC AUTO: 97 FL (ref 82–98)
MONOCYTES # BLD AUTO: 1.6 K/UL (ref 0.3–1)
MONOCYTES NFR BLD: 14.3 % (ref 4–15)
NEUTROPHILS # BLD AUTO: 8.5 K/UL (ref 1.8–7.7)
NEUTROPHILS NFR BLD: 76.8 % (ref 38–73)
NRBC BLD-RTO: 0 /100 WBC
PLATELET # BLD AUTO: 194 K/UL (ref 150–450)
PMV BLD AUTO: 11.1 FL (ref 9.2–12.9)
POTASSIUM SERPL-SCNC: 4.5 MMOL/L (ref 3.5–5.1)
RBC # BLD AUTO: 3.32 M/UL (ref 4–5.4)
SODIUM SERPL-SCNC: 138 MMOL/L (ref 136–145)
WBC # BLD AUTO: 11.05 K/UL (ref 3.9–12.7)

## 2023-11-11 PROCEDURE — 99900031 HC PATIENT EDUCATION (STAT)

## 2023-11-11 PROCEDURE — 63600175 PHARM REV CODE 636 W HCPCS: Performed by: STUDENT IN AN ORGANIZED HEALTH CARE EDUCATION/TRAINING PROGRAM

## 2023-11-11 PROCEDURE — 92610 EVALUATE SWALLOWING FUNCTION: CPT

## 2023-11-11 PROCEDURE — 94761 N-INVAS EAR/PLS OXIMETRY MLT: CPT

## 2023-11-11 PROCEDURE — 25000242 PHARM REV CODE 250 ALT 637 W/ HCPCS: Performed by: INTERNAL MEDICINE

## 2023-11-11 PROCEDURE — 27000221 HC OXYGEN, UP TO 24 HOURS

## 2023-11-11 PROCEDURE — 97165 OT EVAL LOW COMPLEX 30 MIN: CPT

## 2023-11-11 PROCEDURE — 94799 UNLISTED PULMONARY SVC/PX: CPT

## 2023-11-11 PROCEDURE — 25000003 PHARM REV CODE 250: Performed by: INTERNAL MEDICINE

## 2023-11-11 PROCEDURE — 36415 COLL VENOUS BLD VENIPUNCTURE: CPT | Performed by: STUDENT IN AN ORGANIZED HEALTH CARE EDUCATION/TRAINING PROGRAM

## 2023-11-11 PROCEDURE — 80048 BASIC METABOLIC PNL TOTAL CA: CPT | Performed by: STUDENT IN AN ORGANIZED HEALTH CARE EDUCATION/TRAINING PROGRAM

## 2023-11-11 PROCEDURE — 94760 N-INVAS EAR/PLS OXIMETRY 1: CPT

## 2023-11-11 PROCEDURE — 85025 COMPLETE CBC W/AUTO DIFF WBC: CPT | Performed by: STUDENT IN AN ORGANIZED HEALTH CARE EDUCATION/TRAINING PROGRAM

## 2023-11-11 PROCEDURE — 12000002 HC ACUTE/MED SURGE SEMI-PRIVATE ROOM

## 2023-11-11 PROCEDURE — 25000003 PHARM REV CODE 250: Performed by: STUDENT IN AN ORGANIZED HEALTH CARE EDUCATION/TRAINING PROGRAM

## 2023-11-11 PROCEDURE — 97535 SELF CARE MNGMENT TRAINING: CPT

## 2023-11-11 PROCEDURE — 94640 AIRWAY INHALATION TREATMENT: CPT

## 2023-11-11 PROCEDURE — 99900035 HC TECH TIME PER 15 MIN (STAT)

## 2023-11-11 RX ADMIN — IPRATROPIUM BROMIDE AND ALBUTEROL SULFATE 3 ML: 2.5; .5 SOLUTION RESPIRATORY (INHALATION) at 01:11

## 2023-11-11 RX ADMIN — MUPIROCIN 1 G: 20 OINTMENT TOPICAL at 08:11

## 2023-11-11 RX ADMIN — SOTALOL HYDROCHLORIDE 80 MG: 80 TABLET ORAL at 08:11

## 2023-11-11 RX ADMIN — CALCIUM ACETATE 667 MG: 667 CAPSULE ORAL at 04:11

## 2023-11-11 RX ADMIN — IPRATROPIUM BROMIDE AND ALBUTEROL SULFATE 3 ML: 2.5; .5 SOLUTION RESPIRATORY (INHALATION) at 08:11

## 2023-11-11 RX ADMIN — LATANOPROST 1 DROP: 50 SOLUTION OPHTHALMIC at 09:11

## 2023-11-11 RX ADMIN — DILTIAZEM HYDROCHLORIDE 120 MG: 120 CAPSULE, COATED, EXTENDED RELEASE ORAL at 08:11

## 2023-11-11 RX ADMIN — CEFTRIAXONE SODIUM 1 G: 1 INJECTION, POWDER, FOR SOLUTION INTRAMUSCULAR; INTRAVENOUS at 08:11

## 2023-11-11 RX ADMIN — MUPIROCIN: 20 OINTMENT TOPICAL at 09:11

## 2023-11-11 RX ADMIN — APIXABAN 2.5 MG: 2.5 TABLET, FILM COATED ORAL at 08:11

## 2023-11-11 RX ADMIN — DORZOLAMIDE HYDROCHLORIDE AND TIMOLOL MALEATE 1 DROP: 22.3; 6.8 SOLUTION/ DROPS OPHTHALMIC at 09:11

## 2023-11-11 RX ADMIN — CALCIUM ACETATE 667 MG: 667 CAPSULE ORAL at 07:11

## 2023-11-11 RX ADMIN — ATORVASTATIN CALCIUM 40 MG: 40 TABLET, FILM COATED ORAL at 08:11

## 2023-11-11 RX ADMIN — IPRATROPIUM BROMIDE AND ALBUTEROL SULFATE 3 ML: 2.5; .5 SOLUTION RESPIRATORY (INHALATION) at 02:11

## 2023-11-11 RX ADMIN — DORZOLAMIDE HYDROCHLORIDE AND TIMOLOL MALEATE 1 DROP: 22.3; 6.8 SOLUTION/ DROPS OPHTHALMIC at 08:11

## 2023-11-11 NOTE — PLAN OF CARE
Problem: SLP  Goal: SLP Goal  Description: 1. Pt will tolerate IDDSI level 5 diet and thin liquids w/o overt s/s of aspiration.   Outcome: Ongoing, Progressing     B/s swallow completed, ST will follow for ongoing dysphagia treatment. GI consult recommended.

## 2023-11-11 NOTE — PLAN OF CARE
11/10/23 1937   Patient Assessment/Suction   Level of Consciousness (AVPU) alert   Respiratory Effort Normal;Unlabored   All Lung Fields Breath Sounds coarse;diminished   Rhythm/Pattern, Respiratory no shortness of breath reported   PRE-TX-O2   Device (Oxygen Therapy) Oxymask   $ Is the patient on Low Flow Oxygen? Yes   Flow (L/min) 2   SpO2 100 %   Pulse Oximetry Type Intermittent   $ Pulse Oximetry - Multiple Charge Pulse Oximetry - Multiple   Pulse (!) 57   Resp 18   Aerosol Therapy   $ Aerosol Therapy Charges Aerosol Treatment   Daily Review of Necessity (SVN) completed   Respiratory Treatment Status (SVN) given   Treatment Route (SVN) mask;oxygen   Patient Position (SVN) Persaud's;HOB elevated   Post Treatment Assessment (SVN) breath sounds improved   Signs of Intolerance (SVN) none   Education   $ Education Bronchodilator;15 min   Respiratory Evaluation   $ Care Plan Tech Time 15 min

## 2023-11-11 NOTE — PT/OT/SLP EVAL
Speech Language Pathology Evaluation  Bedside Swallow    Patient Name:  Tyra Isaac   MRN:  [id number]  Admitting Diagnosis: Pneumonia    Recommendations:                 General Recommendations:  Dysphagia therapy; GI consult   Diet recommendations:  Mechanical soft, Thin   Aspiration Precautions: Alternating bites/sips, Feed only when awake/alert, Frequent oral care, Meds whole buried in puree, and Remain upright 30 minutes post meal   General Precautions: Standard, aspiration, respiratory, fall  Communication strategies:  provide increased time to answer    Assessment:     Tyra Isaac is a 83 y.o. female with a admitting diagnosis of  Pneumonia. She presents with oropharyngeal dysphagia of unknown severity, and possible esophageal dysphagia d/t regurgitation of food. GI consult recommended. ST will follow for ongoing dysphagia tx.   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    Osteopenia of multiple sites 3/9/2018    Based upon bone density measurements. Patient also has chronic kidney disease.    Stroke 11/2016       Past Surgical History:   Procedure Laterality Date    ANGIOGRAM, CORONARY, WITH LEFT HEART CATHETERIZATION N/A 2/7/2022    Procedure: Angiogram, Coronary, with Left Heart Cath;  Surgeon: Gino Leal MD;  Location: [facility] CATH/EP LAB;  Service: Cardiology;  Laterality: N/A;    CARDIAC SURGERY      stents    EYE SURGERY      WRIST SURGERY         Modified Barium Swallow: Not located in pt's chart    Chest X-Rays: CXR on 11/09/23  w/o comparison    Findings: The cardiomediastinal silhouette is enlarged.No pneumothorax is seen.There are bilateral interstitial airspace opacities. No discrete pleural effusion is apparent.    Prior diet: Regular consistency during current admission.     Subjective     Pt demo mild SOB upon ST arrival to b/s with partially consumed AM reg consistency meal tray. She regurgitated several bites of poorly masticated food. Pt was repositioned in bed with support. Improved breathing was observed following.     Patient goals: To continue safe consumption of PO intake.      Pain/Comfort:  Pain Rating 1: 0/10    Respiratory Status: Room air    Objective:     Oral Musculature Evaluation  Oral Musculature: WFL  Dentition: partials  Secretion Management: adequate  Mucosal Quality: adequate  Mandibular Strength and Mobility: WFL  Oral Labial Strength and Mobility: WFL  Lingual Strength and Mobility: WFL  Velar Elevation: WFL  Buccal Strength and Mobility: WFL  Volitional Cough: WFL  Volitional Swallow: Delayed  Voice Prior to PO Intake: Clear    Bedside Swallow Eval:   Consistencies Assessed:  Thin liquids - 3oz via cup edge and straw   Puree - x3tsp   Soft solids - x2      Oral Phase:   Prolonged mastication - across ss trials  Lingual residue - Trace lingual residue s/p ss   Slow oral transit time - across trials    Pharyngeal Phase:   delayed swallow initation    Compensatory Strategies  None    Treatment: Education provided regarding POC and continued dysphagia tx, as well as training for application of safe swallow strategy use particularly following dialysis treatment to promote increased safety during meals and minimize aspiration risk.     Goals:   Multidisciplinary Problems       SLP Goals          Problem: SLP    Goal Priority Disciplines Outcome   SLP Goal     SLP Ongoing, Progressing   Description: 1. Pt will tolerate IDDSI level 5 diet and thin liquids w/o overt s/s of aspiration.                        Plan:      Patient to be seen:  3 x/week   Plan of Care expires:     Plan of Care reviewed with:  patient   SLP Follow-Up:  Yes       Discharge recommendations:      Barriers to Discharge:  None    Time Tracking:     SLP Treatment Date:   11/11/23  Speech Start Time:  0851  Speech Stop Time:  0915     Speech Total Time (min):  24 min    Billable Minutes: Eval Swallow and Oral Function 9min and Self Care/Home Management Training 15min    11/11/2023

## 2023-11-11 NOTE — SUBJECTIVE & OBJECTIVE
"Interval History: Ms Isaac remains confused. She states that she can " breath better".    Review of Systems   Unable to perform ROS: Mental status change     Objective:     Vital Signs (Most Recent):  Temp: 98.9 °F (37.2 °C) (11/11/23 1700)  Pulse: (!) 56 (11/11/23 1700)  Resp: 17 (11/11/23 1700)  BP: (!) 90/51 (11/11/23 1700)  SpO2: 100 % (11/11/23 1700) Vital Signs (24h Range):  Temp:  [98.3 °F (36.8 °C)-99.7 °F (37.6 °C)] 98.9 °F (37.2 °C)  Pulse:  [56-61] 56  Resp:  [16-20] 17  SpO2:  [100 %] 100 %  BP: ()/(46-57) 90/51     Weight: 52.2 kg (115 lb 1.3 oz)  Body mass index is 19.15 kg/m².    Intake/Output Summary (Last 24 hours) at 11/11/2023 1721  Last data filed at 11/10/2023 2214  Gross per 24 hour   Intake 220 ml   Output --   Net 220 ml         Physical Exam  Vitals and nursing note reviewed.   Constitutional:       General: She is not in acute distress.  HENT:      Head: Normocephalic and atraumatic.      Nose: Nose normal.      Mouth/Throat:      Mouth: Mucous membranes are moist.   Eyes:      Extraocular Movements: Extraocular movements intact.      Pupils: Pupils are equal, round, and reactive to light.   Cardiovascular:      Rate and Rhythm: Normal rate and regular rhythm.   Pulmonary:      Breath sounds: Rales present.      Comments: Occasional dependent  Abdominal:      General: Bowel sounds are normal.   Musculoskeletal:      Cervical back: Normal range of motion and neck supple.      Comments: Moves her extremities   Skin:     General: Skin is warm.   Neurological:      Mental Status: She is alert and oriented to person, place, and time.   Psychiatric:      Comments: Dulled affect             Significant Labs: All pertinent labs within the past 24 hours have been reviewed.  Recent Lab Results         11/11/23  0355   11/10/23  2223        Anion Gap 12         Baso # 0.02         Basophil % 0.2         BUN 32         Calcium 8.8         Chloride 103         CO2 23         Creatinine 6.3      "    Differential Method Automated         eGFR 6.1         Eos # 0.0         Eosinophil % 0.3         Glucose 73         Gran # (ANC) 8.5         Gran % 76.8         Hematocrit 32.2         Hemoglobin 10.2         Immature Grans (Abs) 0.03  Comment: Mild elevation in immature granulocytes is non specific and   can be seen in a variety of conditions including stress response,   acute inflammation, trauma and pregnancy. Correlation with other   laboratory and clinical findings is essential.           Immature Granulocytes 0.3         Lymph # 0.9         Lymph % 8.1         MCH 30.7         MCHC 31.7         MCV 97         Mono # 1.6         Mono % 14.3         MPV 11.1         nRBC 0         Platelet Count 194         Potassium 4.5         RBC 3.32         RDW 15.5         Sodium 138         Vancomycin, Random   15.4       WBC 11.05                 Significant Imaging: I have reviewed all pertinent imaging results/findings within the past 24 hours.

## 2023-11-11 NOTE — PT/OT/SLP PROGRESS
Physical Therapy      Patient Name:  Tyra Isaac   MRN:  [id number]    Patient not seen today secondary to Patient fatigue, Other (Comment) (PT attempted eval x 2 times. first patient with speech and second could not be adequately aroused to participate in PT evaluation.). Will follow-up 11/12/23.

## 2023-11-11 NOTE — PROGRESS NOTES
"[facility] Medicine  Progress Note    Patient Name: Tyra Isaac  MRN: [id number]  Patient Class: IP- Inpatient   Admission Date: 11/9/2023  Length of Stay: 1 days  Attending Physician: Humble Yee MD  Primary Care Provider: Kalpesh Goel MD        Subjective:     Principal Problem:Pneumonia        HPI:  Ms. Isaac is a 82 yo w/pmhx of ESRD on HD MWF, afib, HTN presenting w/nausea/vomiting. Reports vomiting x2 this AM after which her son called EMS. She also reports feeling SOB and having a non-productive cough this AM. Denies ever choking on food but does report she coughed up food this AM. Did not check her temperature this AM but reports her son thought she was feverish to EMS. Denies any new rashes. Does make urine but denies any hematuria or dysuria. Did have diarrhea today but reports it started after drinking the contrast for the CT.     In the ED, patient was febrile to 100.7. She was noted to be tachypnic to the high 30s by the ED. Ct noted multifocal PNA and bilateral hilar lymphadenopathy. She was given ctx/azithro.       Overview/Hospital Course:  11/10/2023  Ms Isaac is confused and easily distracted. She states that she has trouble breathing. She denies chest pain or numbness but is generally weak. " Otherwise I cannot say anything ".    11/11/2023  Ms Isaac arouses and speaks but will only say that she feels bad but cannot provide any more information. " I can breath now".      Interval History: Ms Isaac remains confused. She states that she can " breath better".    Review of Systems   Unable to perform ROS: Mental status change     Objective:     Vital Signs (Most Recent):  Temp: 98.9 °F (37.2 °C) (11/11/23 1700)  Pulse: (!) 56 (11/11/23 1700)  Resp: 17 (11/11/23 1700)  BP: (!) 90/51 (11/11/23 1700)  SpO2: 100 % (11/11/23 1700) Vital Signs (24h Range):  Temp:  [98.3 °F (36.8 °C)-99.7 °F (37.6 °C)] 98.9 °F (37.2 °C)  Pulse:  [56-61] 56  Resp:  [16-20] " 17  SpO2:  [100 %] 100 %  BP: ()/(46-57) 90/51     Weight: 52.2 kg (115 lb 1.3 oz)  Body mass index is 19.15 kg/m².    Intake/Output Summary (Last 24 hours) at 11/11/2023 1721  Last data filed at 11/10/2023 2214  Gross per 24 hour   Intake 220 ml   Output --   Net 220 ml         Physical Exam  Vitals and nursing note reviewed.   Constitutional:       General: She is not in acute distress.  HENT:      Head: Normocephalic and atraumatic.      Nose: Nose normal.      Mouth/Throat:      Mouth: Mucous membranes are moist.   Eyes:      Extraocular Movements: Extraocular movements intact.      Pupils: Pupils are equal, round, and reactive to light.   Cardiovascular:      Rate and Rhythm: Normal rate and regular rhythm.   Pulmonary:      Breath sounds: Rales present.      Comments: Occasional dependent  Abdominal:      General: Bowel sounds are normal.   Musculoskeletal:      Cervical back: Normal range of motion and neck supple.      Comments: Moves her extremities   Skin:     General: Skin is warm.   Neurological:      Mental Status: She is alert and oriented to person, place, and time.   Psychiatric:      Comments: Dulled affect             Significant Labs: All pertinent labs within the past 24 hours have been reviewed.  Recent Lab Results         11/11/23  0355   11/10/23  2223        Anion Gap 12         Baso # 0.02         Basophil % 0.2         BUN 32         Calcium 8.8         Chloride 103         CO2 23         Creatinine 6.3         Differential Method Automated         eGFR 6.1         Eos # 0.0         Eosinophil % 0.3         Glucose 73         Gran # (ANC) 8.5         Gran % 76.8         Hematocrit 32.2         Hemoglobin 10.2         Immature Grans (Abs) 0.03  Comment: Mild elevation in immature granulocytes is non specific and   can be seen in a variety of conditions including stress response,   acute inflammation, trauma and pregnancy. Correlation with other   laboratory and clinical findings is  essential.           Immature Granulocytes 0.3         Lymph # 0.9         Lymph % 8.1         MCH 30.7         MCHC 31.7         MCV 97         Mono # 1.6         Mono % 14.3         MPV 11.1         nRBC 0         Platelet Count 194         Potassium 4.5         RBC 3.32         RDW 15.5         Sodium 138         Vancomycin, Random   15.4       WBC 11.05                 Significant Imaging: I have reviewed all pertinent imaging results/findings within the past 24 hours.      Assessment/Plan:      * Pneumonia  CT w/bilateral PNA   -speech consult to evaluate for aspiration PNA  -s/p azithro/ctx in ED   -hold further azithro for now 2/2 prolonged QTc  -cont ctx   -add vanc (ESRD - MRSA risk factor)  -MRSA nares   -procal in AM   -blood cultures   -UA to complete infectious workup   -PT/OT; pt lives alone       Nausea  -scopolamine patch   -avoid QTc prolonging agents   -recheck EKG in AM       Anemia of chronic disease  Stable   ctm     ESRD on HD via are right upper extremity AVF MWF  HD MVF  -consult nephrology- follows w/Dr. Maynard       Paroxysmal atrial fibrillation  -check INR (pt unsure if she takes eliquis or warfarin)  -continue eliquis for now   -sotalol   -re-check EKG in AM (QtC >500 and may need to stop sotalol)  -diltizem   -also had metoprolol listed on MAR but upon pharmacy tech review, last filled in July 2023 - hold for now         VTE Risk Mitigation (From admission, onward)         Ordered     apixaban tablet 2.5 mg  2 times daily         11/09/23 2139     IP VTE HIGH RISK PATIENT  Once         11/09/23 2139     Place sequential compression device  Until discontinued         11/09/23 2139                Discharge Planning   ILAN: 11/12/2023     Code Status: Full Code   Is the patient medically ready for discharge?:     Reason for patient still in hospital (select all that apply): Patient new problem, Treatment and Consult recommendations  Discharge Plan A: Home Health                  Humble GALLO  MD Joselito  Department of Hospital Medicine   [facility]

## 2023-11-11 NOTE — CARE UPDATE
11/11/23 0807   Patient Assessment/Suction   All Lung Fields Breath Sounds coarse   Rhythm/Pattern, Respiratory unlabored   PRE-TX-O2   Device (Oxygen Therapy) Oxymask   $ Is the patient on Low Flow Oxygen? Yes   Flow (L/min) 2   SpO2 100 %   Pulse Oximetry Type Continuous   $ Pulse Oximetry - Multiple Charge Pulse Oximetry - Multiple   Pulse 60   Resp 20   Aerosol Therapy   $ Aerosol Therapy Charges Aerosol Treatment   Daily Review of Necessity (SVN) completed   Respiratory Treatment Status (SVN) given   Treatment Route (SVN) mask;oxygen   Patient Position (SVN) semi-Persaud's   Post Treatment Assessment (SVN) breath sounds unchanged;vital signs unchanged   Signs of Intolerance (SVN) none   Education   $ Education Bronchodilator;15 min  (DEEP BREATHING/COUGH)   Respiratory Evaluation   $ Care Plan Tech Time 15 min     PLACED O2 ON S/B - RA SATS 100-97%

## 2023-11-11 NOTE — PROGRESS NOTES
Nephrology Consult Note        Patient Name: Tyra Isaac  MRN: [id number]    Patient Class: IP- Inpatient   Admission Date: 11/9/2023  Length of Stay: 1 days  Date of Service: 11/11/2023    Attending Physician: Humble Yee MD  Primary Care Provider: Kalpesh Goel MD    Reason for Consult: esrd    SUBJECTIVE:     HPI: 82 yo w/pmhx of ESRD on HD MWF by Dr. Valero, afib, HTN presenting w/nausea/vomiting. Reports vomiting x2 this AM after which her son called EMS. She also reports feeling SOB and having a non-productive cough this AM. Denies ever choking on food but does report she coughed up food this AM. Did not check her temperature this AM but reports her son thought she was feverish to EMS. Denies any new rashes. Does make urine but denies any hematuria or dysuria. Did have diarrhea today but reports it started after drinking the contrast for the CT.      In the ED, patient was febrile to 100.7. She was noted to be tachypnic to the high 30s by the ED. Ct noted multifocal PNA and bilateral hilar lymphadenopathy. She was given ctx/azithro.     11/11 VSS, no new complains. HD on Mon or PRN.    Past Medical History:   Diagnosis Date    A-fib     Anxiety     Depression     Disorder of kidney and ureter     Encephalopathy acute 1/1/2018    End stage kidney disease 6/17/2017    Gout     Hyperlipidemia     Hypertension     Moderate episode of recurrent major depressive disorder 1/17/2018    Nephropathy hypertensive, 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    Osteopenia of multiple sites 3/9/2018    Based upon bone density measurements. Patient also has chronic kidney disease.    Stroke 11/2016     Past Surgical History:   Procedure Laterality Date    ANGIOGRAM, CORONARY, WITH LEFT HEART CATHETERIZATION N/A 2/7/2022    Procedure: Angiogram, Coronary, with Left Heart Cath;  Surgeon:  Gino Leal MD;  Location: [facility] CATH/EP LAB;  Service: Cardiology;  Laterality: N/A;    CARDIAC SURGERY      stents    EYE SURGERY      WRIST SURGERY       Family History   Problem Relation Age of Onset    Heart disease Mother     Cancer Father      Social History     Tobacco Use    Smoking status: Never    Smokeless tobacco: Never   Substance Use Topics    Alcohol use: No    Drug use: No       Review of patient's allergies indicates:   Allergen Reactions    Cyclobenzaprine     Fish containing products Hives    Peanut Other (See Comments)    Tramadol Itching       Outpatient meds:  No current facility-administered medications on file prior to encounter.     Current Outpatient Medications on File Prior to Encounter   Medication Sig Dispense Refill    atorvastatin (LIPITOR) 40 MG tablet Take 40 mg by mouth once daily.      b complex vitamins tablet Take 1 tablet by mouth once daily.      calcium acetate,phosphat bind, (PHOSLO) 667 mg tablet Take 667 mg by mouth 2 (two) times daily with meals.      diltiaZEM (CARDIZEM CD) 120 MG Cp24 Take 1 capsule (120 mg total) by mouth 2 (two) times a day.      docusate sodium (COLACE) 100 MG capsule Take 100 mg by mouth once daily.      dorzolamide-timolol 2-0.5% (COSOPT) 22.3-6.8 mg/mL ophthalmic solution Place 1 drop into both eyes 2 (two) times daily.      ELIQUIS 2.5 mg Tab Take 2.5 mg by mouth 2 (two) times daily.      latanoprost 0.005 % ophthalmic solution Place 1 drop into both eyes every evening.      metoprolol tartrate (LOPRESSOR) 25 MG tablet Take 1 tablet (25 mg total) by mouth 2 (two) times daily. 60 tablet 11    ondansetron (ZOFRAN) 4 MG tablet Take 1 tablet (4 mg total) by mouth every 8 (eight) hours as needed for Nausea. 15 tablet 0    sotaloL (BETAPACE) 80 MG tablet Take 80 mg by mouth 2 (two) times daily. Take 1/2 tablet (40mg) BID         Scheduled meds:   albuterol-ipratropium  3 mL Nebulization Q6H    apixaban  2.5 mg Oral BID    atorvastatin  40 mg Oral  Daily    calcium acetate(phosphat bind)  667 mg Oral BID WM    cefTRIAXone (ROCEPHIN) IVPB  1 g Intravenous Q24H    diltiaZEM  120 mg Oral BID    dorzolamide-timolol 2-0.5%  1 drop Both Eyes BID    latanoprost  1 drop Both Eyes QHS    mupirocin   Nasal BID    sotaloL  80 mg Oral BID       Infusions:      PRN meds:  acetaminophen, melatonin, promethazine (PHENERGAN) 12.5 mg in dextrose 5 % (D5W) 50 mL IVPB, sodium chloride 0.9%, Pharmacy to dose Vancomycin consult **AND** vancomycin - pharmacy to dose    Review of Systems:    OBJECTIVE:     Vital Signs and IO:  Temp:  [98.3 °F (36.8 °C)-99.7 °F (37.6 °C)]   Pulse:  [50-65]   Resp:  [16-20]   BP: (107-152)/(46-72)   SpO2:  [96 %-100 %]   I/O last 3 completed shifts:  In: 1315 [P.O.:365; I.V.:350; Other:600]  Out: 3360 [Other:3360]  Wt Readings from Last 5 Encounters:   11/09/23 52.2 kg (115 lb 1.3 oz)   07/22/23 52.2 kg (115 lb)   07/18/23 51.5 kg (113 lb 9.6 oz)   06/28/23 49.9 kg (110 lb)   03/31/23 51.3 kg (113 lb)     Body mass index is 19.15 kg/m².    Physical Exam  Constitutional:       General: Patient is not in acute distress.     Appearance: Patient is well-developed. She is not diaphoretic.   HENT:      Head: Normocephalic and atraumatic.      Mouth/Throat: Mucous membranes are moist.   Eyes:      General: No scleral icterus.     Pupils: Pupils are equal, round, and reactive to light.   Cardiovascular:      Rate and Rhythm: Normal rate and regular rhythm.   Pulmonary:      Effort: Pulmonary effort is normal. No respiratory distress.      Breath sounds: No stridor.   Abdominal:      General: There is no distension.      Palpations: Abdomen is soft.   Musculoskeletal:         General: No deformity. Normal range of motion.      Cervical back: Neck supple.   Skin:     General: Skin is warm and dry.      Findings: No rash present. No erythema.   Neurological:      Mental Status: Patient is alert and oriented to person, place, and time.      Cranial Nerves: No  "cranial nerve deficit.   Psychiatric:         Behavior: Behavior normal.     Laboratory:  Recent Labs   Lab 11/09/23  1705 11/10/23  0509 11/11/23  0355    136 138   K 3.8 3.6 4.5   CL 99 94* 103   CO2 32* 31* 23   BUN 44* 49* 32*   CREATININE 7.8* 8.1* 6.3*    72 73         Recent Labs   Lab 11/09/23  1705 11/10/23  0509 11/11/23  0355   CALCIUM 8.2* 7.8* 8.8   ALBUMIN 3.7  --   --                No results for input(s): "POCTGLUCOSE" in the last 168 hours.    Recent Labs   Lab 01/26/22  1525   Hemoglobin A1C 5.3         Recent Labs   Lab 11/09/23  1705 11/10/23  0509 11/11/23  0355   WBC 7.38 6.50 11.05   HGB 10.2* 10.0* 10.2*   HCT 32.2* 30.9* 32.2*    181 194   MCV 98 96 97   MCHC 31.7* 32.4 31.7*   MONO 17.1*  1.3* 19.4*  1.3* 14.3  1.6*   EOSINOPHIL 0.7 5.1 0.3         Recent Labs   Lab 11/09/23  1705   BILITOT 1.1*   PROT 6.6   ALBUMIN 3.7   ALKPHOS 54*   ALT 20   AST 25         Recent Labs   Lab 02/02/22  1605 03/31/23  1046 11/09/23  2028   Color, UA Yellow Yellow Yellow   Appearance, UA Clear Clear Clear   pH, UA >8.0 A 8.0 >8.0 A   Specific [place], UA 1.005 1.010 1.015   Protein, UA 1+ A 1+ A 2+ A   Glucose, UA Negative Negative Trace A   Ketones, UA Negative Negative Negative   Urobilinogen, UA Negative Negative 2.0-3.0 A   Bilirubin (UA) Negative Negative Negative   Occult Blood UA Negative Negative Trace A   Nitrite, UA Negative Negative Negative   RBC, UA 1 0 1   WBC, UA 1 0 0   Bacteria Negative Negative Negative   Hyaline Casts, UA 3 A 1 3 A         Recent Labs   Lab 11/10/23  1518 11/10/23  1529   POC PH 7.395 7.391   POC PCO2 36.5 37.2   POC HCO3 22.4 L 22.6 L   POC PO2 54 LL 53 LL   POC SATURATED O2 88 87   POC BE -3 L -2   Sample ARTERIAL ARTERIAL       Microbiology Results (last 7 days)       Procedure Component Value Units Date/Time    Blood Culture #1 **CANNOT BE ORDERED STAT** [[id number]] Collected: 11/09/23 2121    Order Status: Completed Specimen: Blood Updated: " 11/10/23 2232     Blood Culture, Routine No Growth to date      No Growth to date    Blood Culture #2 **CANNOT BE ORDERED STAT** [[id number]] Collected: 11/09/23 2201    Order Status: Completed Specimen: Blood Updated: 11/10/23 2232     Blood Culture, Routine No Growth to date      No Growth to date    Culture, Respiratory with Gram Stain [[id number]]     Order Status: No result Specimen: Respiratory     MRSA Screen by PCR [[id number]] Collected: 11/09/23 2207    Order Status: Completed Specimen: Nasopharyngeal Swab from Nasal Updated: 11/09/23 2343     MRSA SCREEN BY PCR Negative            ASSESSMENT/PLAN:     ESRD on HD MWF via AVF  Next HD per schedule.  Continue current dialysis prescription.  Renal diet - low K, low phos.  No IVs or BP checks on access and/or non-dominant arm.    Anemia of CKD  Hgb and HCT are acceptable. Monitor for now.  Will provide BRAYAN and/or IV iron PRN.    MBD / Secondary HPT  Ca, Phos, PTH and vitamin D levels are acceptable.   Phos binders, vitamin D and analogues, calcimimetics will be given as needed.    HTN  BP seem controlled.   Tolerate asymptomatic HTN up to -160.  Continue home meds.  Low sodium diet.    PNA  Management per primary team.    Thank you for allowing us to participate in the care of your patient!   We will follow the patient and provide recommendations as needed.    Patient care time was spent personally by me on the following activities:     Obtaining a history.  Examination of patient.  Providing medical care at the patients bedside.  Developing a treatment plan with patient or surrogate and bedside caregivers.  Ordering and reviewing laboratory studies, radiographic studies, pulse oximetry.  Ordering and performing treatments and interventions.  Evaluation of patient's response to treatment.  Discussions with consultants while on the unit and immediately available to the patient.  Re-evaluation of the patient's condition.  Documentation in the medical  record.     Raleigh Yee MD    [facility]  [address] [place] [address]    [phone] - tel  [phone] - fax    11/11/2023

## 2023-11-11 NOTE — PROGRESS NOTES
"[facility] Medicine  Progress Note    Patient Name: Tyra Isaac  MRN: [id number]  Patient Class: OP- Observation   Admission Date: 11/9/2023  Length of Stay: 0 days  Attending Physician: Humble Yee MD  Primary Care Provider: Kalpesh Goel MD        Subjective:     Principal Problem:Pneumonia        HPI:  Ms. sIaac is a 84 yo w/pmhx of ESRD on HD MWF, afib, HTN presenting w/nausea/vomiting. Reports vomiting x2 this AM after which her son called EMS. She also reports feeling SOB and having a non-productive cough this AM. Denies ever choking on food but does report she coughed up food this AM. Did not check her temperature this AM but reports her son thought she was feverish to EMS. Denies any new rashes. Does make urine but denies any hematuria or dysuria. Did have diarrhea today but reports it started after drinking the contrast for the CT.     In the ED, patient was febrile to 100.7. She was noted to be tachypnic to the high 30s by the ED. Ct noted multifocal PNA and bilateral hilar lymphadenopathy. She was given ctx/azithro.       Overview/Hospital Course:  11/10/2023  Ms Isaac is confused and easily distracted. She states that she has trouble breathing. She denies chest pain or numbness but is generally weak. " Otherwise I cannot say anything ".      Interval History: Ms Isaac has acute respiratory failure with hypoxia.     Review of Systems   Constitutional:  Positive for activity change, diaphoresis and fatigue. Negative for chills and fever.   HENT: Negative.     Eyes: Negative.    Respiratory:  Positive for cough, chest tightness and shortness of breath.    Cardiovascular: Negative.    Gastrointestinal:  Positive for abdominal distention. Negative for abdominal pain.   Endocrine: Positive for cold intolerance.   Genitourinary: Negative.    Musculoskeletal:  Positive for arthralgias, gait problem and myalgias.   Skin: Negative.    Allergic/Immunologic: Positive for " immunocompromised state.   Neurological:  Positive for weakness.   Hematological:  Bruises/bleeds easily.   Psychiatric/Behavioral:  Positive for confusion and decreased concentration. The patient is nervous/anxious.      Objective:     Vital Signs (Most Recent):  Temp: 99.3 °F (37.4 °C) (11/10/23 1713)  Pulse: 65 (11/10/23 1610)  Resp: 16 (11/10/23 1210)  BP: (!) 144/70 (11/10/23 1610)  SpO2: 96 % (11/10/23 1610) Vital Signs (24h Range):  Temp:  [98 °F (36.7 °C)-99.6 °F (37.6 °C)] 99.3 °F (37.4 °C)  Pulse:  [50-78] 65  Resp:  [16-20] 16  SpO2:  [95 %-98 %] 96 %  BP: (122-158)/(54-72) 144/70     Weight: 52.2 kg (115 lb 1.3 oz)  Body mass index is 19.15 kg/m².    Intake/Output Summary (Last 24 hours) at 11/10/2023 1750  Last data filed at 11/10/2023 1451  Gross per 24 hour   Intake 1095 ml   Output 3360 ml   Net -2265 ml         Physical Exam  Vitals and nursing note reviewed.   Constitutional:       General: She is not in acute distress.  HENT:      Head: Normocephalic and atraumatic.      Nose: Nose normal.      Mouth/Throat:      Mouth: Mucous membranes are moist.   Eyes:      Extraocular Movements: Extraocular movements intact.      Pupils: Pupils are equal, round, and reactive to light.   Cardiovascular:      Rate and Rhythm: Normal rate and regular rhythm.   Pulmonary:      Effort: Pulmonary effort is normal.      Breath sounds: Normal breath sounds.   Abdominal:      General: Bowel sounds are normal. There is no distension.      Tenderness: There is no abdominal tenderness. There is no guarding or rebound.   Musculoskeletal:         General: Normal range of motion.      Cervical back: Normal range of motion and neck supple.   Skin:     General: Skin is warm.   Neurological:      Mental Status: She is alert. She is disoriented.      Motor: Weakness present.   Psychiatric:      Comments: Disoriented             Significant Labs: All pertinent labs within the past 24 hours have been reviewed.  Recent Lab Results   (Last 5 results in the past 24 hours)        11/10/23  1529   11/10/23  1518   11/10/23  0509   11/09/23  2214   11/09/23  2207        Procalcitonin     3.895  Comment: Procalcitonin Result Interpretation:    <=0.50 ng/mL  Systemic infection (sepsis) is not likely. Local bacterial infection   is   possible.    >0.50 and <= 2.00 ng/mL  Systemic infection (sepsis) is possible, but other conditions are   also known   to elevate procalcitonin.     >2.00 ng/mL  Systemic infection (sepsis) is likely unless other causes are known.    >=10.00 ng/mL  Important systemic inflammatory response, almost exclusively due to   severe   bacterial sepsis or septic shock.             Allens Test Pass   Pass             Anion Gap     11           Baso #     0.02           Basophil %     0.3           Blood Culture, Routine               Site LR   LR             BUN     49           Calcium     7.8           Chloride     94           CO2     31           Creatinine     8.1           DelSys Room Air   Room Air             Differential Method     Automated           eGFR     4.5           Eos #     0.3           Eosinophil %     5.1           Glucose     72           Gran # (ANC)     3.7           Gran %     56.9           Hematocrit     30.9           Hemoglobin     10.0           Immature Grans (Abs)     0.02  Comment: Mild elevation in immature granulocytes is non specific and   can be seen in a variety of conditions including stress response,   acute inflammation, trauma and pregnancy. Correlation with other   laboratory and clinical findings is essential.             Immature Granulocytes     0.3           INR       1.1  Comment: Coumadin Therapy:  2.0 - 3.0 for INR for all indicators except mechanical heart valves  and antiphospholipid syndromes which should use 2.5 - 3.5.           Lymph #     1.2           Lymph %     18.0           MCH     31.2           MCHC     32.4           MCV     96           Mono #     1.3           Mono %      19.4           MPV     10.2           MRSA SCREEN BY PCR         Negative       nRBC     0           Platelet Count     181           POC BE -2   -3             POC HCO3 22.6   22.4             POC PCO2 37.2   36.5             POC PH 7.391   7.395             POC PO2 53   54             POC SATURATED O2 87   88             POC TCO2 24   23             Potassium     3.6           Protime       11.9         RBC     3.21           RDW     15.2           Sample ARTERIAL   ARTERIAL             Sodium     136           WBC     6.50                                  Significant Imaging: I have reviewed all pertinent imaging results/findings within the past 24 hours.      Assessment/Plan:      * Pneumonia  CT w/bilateral PNA   -speech consult to evaluate for aspiration PNA  -s/p azithro/ctx in ED   -hold further azithro for now 2/2 prolonged QTc  -cont ctx   -add vanc (ESRD - MRSA risk factor)  -MRSA nares   -procal in AM   -blood cultures   -UA to complete infectious workup   -PT/OT; pt lives alone       Nausea  -scopolamine patch   -avoid QTc prolonging agents   -recheck EKG in AM       Anemia of chronic disease  Stable   ctm     ESRD on HD via are right upper extremity AVF MWF  HD MVF  -consult nephrology- follows w/Dr. Maynard       Paroxysmal atrial fibrillation  -check INR (pt unsure if she takes eliquis or warfarin)  -continue eliquis for now   -sotalol   -re-check EKG in AM (QtC >500 and may need to stop sotalol)  -diltizem   -also had metoprolol listed on MAR but upon pharmacy tech review, last filled in July 2023 - hold for now         VTE Risk Mitigation (From admission, onward)         Ordered     apixaban tablet 2.5 mg  2 times daily         11/09/23 2139     IP VTE HIGH RISK PATIENT  Once         11/09/23 2139     Place sequential compression device  Until discontinued         11/09/23 2139                Discharge Planning   ILAN: 11/12/2023     Code Status: Full Code   Is the patient medically ready for  discharge?:     Reason for patient still in hospital (select all that apply): Patient unstable, Patient new problem, Treatment and Consult recommendations  Discharge Plan A: Home Health                  Humble Yee MD  Department of Hospital Medicine   [facility]

## 2023-11-11 NOTE — PROGRESS NOTES
Pharmacokinetic Assessment Follow Up: IV Vancomycin    Vancomycin serum concentration assessment(s):    The random level was drawn correctly and can be used to guide therapy at this time. The measurement is above the desired definitive target range of 10 to 15 mcg/mL.    Vancomycin Regimen Plan:    Re-dose when the random level is less than 15 mcg/mL, next level to be drawn at 0430 on 11/12    Drug levels (last 3 results):  Recent Labs   Lab Result Units 11/10/23  2223   Vancomycin, Random ug/mL 15.4       Pharmacy will continue to follow and monitor vancomycin.    Please contact pharmacy at extension [phone] for questions regarding this assessment.    Thank you for the consult,   Jayjay Talbot       Patient brief summary:  Tyra Isaac is a 83 y.o. female initiated on antimicrobial therapy with IV Vancomycin for treatment of lower respiratory infection    Drug Allergies:   Review of patient's allergies indicates:   Allergen Reactions    Cyclobenzaprine     Fish containing products Hives    Peanut Other (See Comments)    Tramadol Itching       Actual Body Weight:   52.2 kg    Renal Function:   Estimated Creatinine Clearance: 4.3 mL/min (A) (based on SCr of 8.1 mg/dL (H)).,     CBC (last 72 hours):  Recent Labs   Lab Result Units 11/09/23  1705 11/10/23  0509   WBC K/uL 7.38 6.50   Hemoglobin g/dL 10.2* 10.0*   Hematocrit % 32.2* 30.9*   Platelets K/uL 186 181   Gran % % 70.6 56.9   Lymph % % 11.0* 18.0   Mono % % 17.1* 19.4*   Eosinophil % % 0.7 5.1   Basophil % % 0.3 0.3   Differential Method  Automated Automated       Metabolic Panel (last 72 hours):  Recent Labs   Lab Result Units 11/09/23  1705 11/09/23  2028 11/10/23  0509   Sodium mmol/L 143  --  136   Potassium mmol/L 3.8  --  3.6   Chloride mmol/L 99  --  94*   CO2 mmol/L 32*  --  31*   Glucose mg/dL 106  --  72   Glucose, UA   --  Trace*  --    BUN mg/dL 44*  --  49*   Creatinine mg/dL 7.8*  --  8.1*   Albumin g/dL 3.7  --   --    Total Bilirubin mg/dL 1.1*   --   --    Alkaline Phosphatase U/L 54*  --   --    AST U/L 25  --   --    ALT U/L 20  --   --        Vancomycin Administrations:  vancomycin given in the last 96 hours                     vancomycin 1.25 g in dextrose 5% 250 mL IVPB (ready to mix) (mg) 1,250 mg New Bag 11/09/23 2305                    Microbiologic Results:  Microbiology Results (last 7 days)       Procedure Component Value Units Date/Time    Blood Culture #1 **CANNOT BE ORDERED STAT** [[id number]] Collected: 11/09/23 2121    Order Status: Completed Specimen: Blood Updated: 11/10/23 2232     Blood Culture, Routine No Growth to date      No Growth to date    Blood Culture #2 **CANNOT BE ORDERED STAT** [[id number]] Collected: 11/09/23 2201    Order Status: Completed Specimen: Blood Updated: 11/10/23 2232     Blood Culture, Routine No Growth to date      No Growth to date    Culture, Respiratory with Gram Stain [[id number]]     Order Status: No result Specimen: Respiratory     MRSA Screen by PCR [[id number]] Collected: 11/09/23 2207    Order Status: Completed Specimen: Nasopharyngeal Swab from Nasal Updated: 11/09/23 2343     MRSA SCREEN BY PCR Negative

## 2023-11-11 NOTE — PLAN OF CARE
Patient now tolerating 2L NC w/100% O2 sats. Aox3, following commands. Does not appear to be in respiratory distress. Will cancel ICU transfer for now.

## 2023-11-11 NOTE — NURSING
Patient weaned from 11LPM to 2LPM via oxymask. SpO2 maintained at 100%. Patient AAO X 3. On call MD notified. Came to bedside and canceled transfer to higher level of care. Medications taken as prescribed except Sotalol 80mg and Diltiazem 120mg held per on call MD. HR 55-60. Patient rested comfortably throughout the night with no complaints.    0610 Patient remains on 2LPM via oxymask; SPO2 maintaining at 100%.

## 2023-11-11 NOTE — PT/OT/SLP EVAL
Occupational Therapy   Evaluation    Name: Tyra Isaac  MRN: [id number]  Admitting Diagnosis: Pneumonia  Recent Surgery: * No surgery found *      Recommendations:     Discharge Recommendations: Mod Intensity Therapy  Discharge Equipment Recommendations:  rollator  Barriers to discharge:  Decreased caregiver support; lives alone     Assessment:     Tyra Isaac is a 83 y.o. female with a medical diagnosis of Pneumonia.  Performance deficits affecting function: weakness, impaired endurance, impaired self care skills, impaired functional mobility, decreased safety awareness, decreased lower extremity function, decreased upper extremity function, impaired cardiopulmonary response to activity.      Rehab Prognosis: Fair; patient would benefit from acute skilled OT services to address these deficits and reach maximum level of function.       Plan:     Patient to be seen 5 x/week to address the above listed problems via self-care/home management, therapeutic activities, therapeutic exercises  Plan of Care Expires: 12/11/23  Plan of Care Reviewed with: patient    Subjective     Chief Complaint: nausea  Patient/Family Comments/goals: return home     Occupational Profile:  Living Environment: lives alone in single story home; son is staying with her  Previous level of function: performs self care with use of AD and dialysis 3 x weekly.   Equipment Used at Home: rollator  Assistance upon Discharge: son is available for assistance at discharge    Pain/Comfort:  Pain Rating 1: 0/10    Patients cultural, spiritual, Mosque conflicts given the current situation: no    Objective:     Communicated with: nurse prior to session.  Patient found HOB elevated with peripheral IV upon OT entry to room.    General Precautions: Standard, aspiration, fall  Orthopedic Precautions: N/A  Braces: N/A  Respiratory Status: Room air    Occupational Performance:    Bed Mobility:    Patient completed Rolling/Turning to Left with   minimum assistance  Patient completed Scooting/Bridging with minimum assistance  Patient completed Supine to Sit with minimum assistance (patient with nausea and returned to supine; elevated HOB)    Activities of Daily Living:  Grooming: stand by assistance/setup for washing face with HOB elevated    Cognitive/Visual Perceptual:  Cognitive/Psychosocial Skills:     -       Oriented to: Person, Place, and Time   -       Follows Commands/attention:Follows multistep  commands  -       Communication: clear/fluent  -       Memory: No Deficits noted  -       Safety awareness/insight to disability: impaired   -       Mood/Affect/Coping skills/emotional control: Appropriate to situation    Physical Exam:  Upper Extremity Range of Motion:     -       Right Upper Extremity: WNL  -       Left Upper Extremity: WNL  Upper Extremity Strength: -       Right Upper Extremity: WNL  -       Left Upper Extremity: WNL   Strength:    -       Right Upper Extremity: WNL  -       Left Upper Extremity: WNL  Fine Motor Coordination:    -       Intact    AMPAC 6 Click ADL:  AMPAC Total Score: 19    Treatment & Education:  Patient was educated on role of OT/POC, importance of activity during hospitalization, equipment needs and discharge planning, and reviewed use of call bell for assistance.     Patient left HOB elevated with all lines intact, call button in reach, and bed alarm on    GOALS:   Multidisciplinary Problems       Occupational Therapy Goals          Problem: Occupational Therapy    Goal Priority Disciplines Outcome Interventions   Occupational Therapy Goal     OT, PT/OT     Description: Goals to be met by: 12/11/2023     Patient will increase functional independence with ADLs by performing:    UE Dressing with Supervision.  LE Dressing with Supervision.  Grooming while seated at sink with Supervision.  Toileting from toilet with Supervision for hygiene and clothing management.   Bathing from  tub bench with Supervision.                          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    Osteopenia of multiple sites 3/9/2018    Based upon bone density measurements. Patient also has chronic kidney disease.    Stroke 11/2016         Past Surgical History:   Procedure Laterality Date    ANGIOGRAM, CORONARY, WITH LEFT HEART CATHETERIZATION N/A 2/7/2022    Procedure: Angiogram, Coronary, with Left Heart Cath;  Surgeon: Gino Leal MD;  Location: [facility] CATH/EP LAB;  Service: Cardiology;  Laterality: N/A;    CARDIAC SURGERY      stents    EYE SURGERY      WRIST SURGERY         Time Tracking:     OT Date of Treatment: 11/11/23  OT Start Time: 0935  OT Stop Time: 0946  OT Total Time (min): 11 min    Billable Minutes:Evaluation 11    11/11/2023

## 2023-11-12 LAB
ANION GAP SERPL CALC-SCNC: 12 MMOL/L (ref 8–16)
BASOPHILS # BLD AUTO: 0.02 K/UL (ref 0–0.2)
BASOPHILS NFR BLD: 0.3 % (ref 0–1.9)
BUN SERPL-MCNC: 55 MG/DL (ref 8–23)
CALCIUM SERPL-MCNC: 8.6 MG/DL (ref 8.7–10.5)
CHLORIDE SERPL-SCNC: 104 MMOL/L (ref 95–110)
CO2 SERPL-SCNC: 22 MMOL/L (ref 23–29)
CREAT SERPL-MCNC: 9.2 MG/DL (ref 0.5–1.4)
DIFFERENTIAL METHOD: ABNORMAL
EOSINOPHIL # BLD AUTO: 0.5 K/UL (ref 0–0.5)
EOSINOPHIL NFR BLD: 6.2 % (ref 0–8)
ERYTHROCYTE [DISTWIDTH] IN BLOOD BY AUTOMATED COUNT: 15.6 % (ref 11.5–14.5)
EST. GFR  (NO RACE VARIABLE): 3.9 ML/MIN/1.73 M^2
GLUCOSE SERPL-MCNC: 68 MG/DL (ref 70–110)
HCT VFR BLD AUTO: 31.6 % (ref 37–48.5)
HGB BLD-MCNC: 10.2 G/DL (ref 12–16)
IMM GRANULOCYTES # BLD AUTO: 0.03 K/UL (ref 0–0.04)
IMM GRANULOCYTES NFR BLD AUTO: 0.4 % (ref 0–0.5)
LYMPHOCYTES # BLD AUTO: 0.9 K/UL (ref 1–4.8)
LYMPHOCYTES NFR BLD: 10.8 % (ref 18–48)
MCH RBC QN AUTO: 31.3 PG (ref 27–31)
MCHC RBC AUTO-ENTMCNC: 32.3 G/DL (ref 32–36)
MCV RBC AUTO: 97 FL (ref 82–98)
MONOCYTES # BLD AUTO: 1.3 K/UL (ref 0.3–1)
MONOCYTES NFR BLD: 16.3 % (ref 4–15)
NEUTROPHILS # BLD AUTO: 5.2 K/UL (ref 1.8–7.7)
NEUTROPHILS NFR BLD: 66 % (ref 38–73)
NRBC BLD-RTO: 0 /100 WBC
PLATELET # BLD AUTO: 189 K/UL (ref 150–450)
PMV BLD AUTO: 10.5 FL (ref 9.2–12.9)
POTASSIUM SERPL-SCNC: 4.5 MMOL/L (ref 3.5–5.1)
RBC # BLD AUTO: 3.26 M/UL (ref 4–5.4)
SODIUM SERPL-SCNC: 138 MMOL/L (ref 136–145)
VANCOMYCIN SERPL-MCNC: 12.6 UG/ML
WBC # BLD AUTO: 7.89 K/UL (ref 3.9–12.7)

## 2023-11-12 PROCEDURE — 80048 BASIC METABOLIC PNL TOTAL CA: CPT | Performed by: STUDENT IN AN ORGANIZED HEALTH CARE EDUCATION/TRAINING PROGRAM

## 2023-11-12 PROCEDURE — 99900031 HC PATIENT EDUCATION (STAT)

## 2023-11-12 PROCEDURE — 94640 AIRWAY INHALATION TREATMENT: CPT

## 2023-11-12 PROCEDURE — 94761 N-INVAS EAR/PLS OXIMETRY MLT: CPT

## 2023-11-12 PROCEDURE — 63600175 PHARM REV CODE 636 W HCPCS: Performed by: INTERNAL MEDICINE

## 2023-11-12 PROCEDURE — 87070 CULTURE OTHR SPECIMN AEROBIC: CPT | Performed by: INTERNAL MEDICINE

## 2023-11-12 PROCEDURE — 85025 COMPLETE CBC W/AUTO DIFF WBC: CPT | Performed by: STUDENT IN AN ORGANIZED HEALTH CARE EDUCATION/TRAINING PROGRAM

## 2023-11-12 PROCEDURE — 25000003 PHARM REV CODE 250: Performed by: INTERNAL MEDICINE

## 2023-11-12 PROCEDURE — 12000002 HC ACUTE/MED SURGE SEMI-PRIVATE ROOM

## 2023-11-12 PROCEDURE — 36415 COLL VENOUS BLD VENIPUNCTURE: CPT | Performed by: INTERNAL MEDICINE

## 2023-11-12 PROCEDURE — 87205 SMEAR GRAM STAIN: CPT | Performed by: INTERNAL MEDICINE

## 2023-11-12 PROCEDURE — 94760 N-INVAS EAR/PLS OXIMETRY 1: CPT

## 2023-11-12 PROCEDURE — 97161 PT EVAL LOW COMPLEX 20 MIN: CPT

## 2023-11-12 PROCEDURE — 25000242 PHARM REV CODE 250 ALT 637 W/ HCPCS: Performed by: INTERNAL MEDICINE

## 2023-11-12 PROCEDURE — 80202 ASSAY OF VANCOMYCIN: CPT | Performed by: INTERNAL MEDICINE

## 2023-11-12 RX ORDER — DOXYCYCLINE 100 MG/1
100 CAPSULE ORAL EVERY 12 HOURS
Status: DISCONTINUED | OUTPATIENT
Start: 2023-11-12 | End: 2023-11-14 | Stop reason: HOSPADM

## 2023-11-12 RX ADMIN — DORZOLAMIDE HYDROCHLORIDE AND TIMOLOL MALEATE 1 DROP: 22.3; 6.8 SOLUTION/ DROPS OPHTHALMIC at 10:11

## 2023-11-12 RX ADMIN — PROMETHAZINE HYDROCHLORIDE 12.5 MG: 25 INJECTION INTRAMUSCULAR; INTRAVENOUS at 09:11

## 2023-11-12 RX ADMIN — DOXYCYCLINE HYCLATE 100 MG: 100 CAPSULE ORAL at 08:11

## 2023-11-12 RX ADMIN — APIXABAN 2.5 MG: 2.5 TABLET, FILM COATED ORAL at 08:11

## 2023-11-12 RX ADMIN — ATORVASTATIN CALCIUM 40 MG: 40 TABLET, FILM COATED ORAL at 10:11

## 2023-11-12 RX ADMIN — IPRATROPIUM BROMIDE AND ALBUTEROL SULFATE 3 ML: 2.5; .5 SOLUTION RESPIRATORY (INHALATION) at 07:11

## 2023-11-12 RX ADMIN — LATANOPROST 1 DROP: 50 SOLUTION OPHTHALMIC at 08:11

## 2023-11-12 RX ADMIN — MUPIROCIN 1 G: 20 OINTMENT TOPICAL at 08:11

## 2023-11-12 RX ADMIN — APIXABAN 2.5 MG: 2.5 TABLET, FILM COATED ORAL at 10:11

## 2023-11-12 RX ADMIN — DORZOLAMIDE HYDROCHLORIDE AND TIMOLOL MALEATE 1 DROP: 22.3; 6.8 SOLUTION/ DROPS OPHTHALMIC at 08:11

## 2023-11-12 RX ADMIN — MUPIROCIN 1 G: 20 OINTMENT TOPICAL at 10:11

## 2023-11-12 RX ADMIN — CALCIUM ACETATE 667 MG: 667 CAPSULE ORAL at 07:11

## 2023-11-12 RX ADMIN — IPRATROPIUM BROMIDE AND ALBUTEROL SULFATE 3 ML: 2.5; .5 SOLUTION RESPIRATORY (INHALATION) at 02:11

## 2023-11-12 RX ADMIN — CALCIUM ACETATE 667 MG: 667 CAPSULE ORAL at 04:11

## 2023-11-12 RX ADMIN — IPRATROPIUM BROMIDE AND ALBUTEROL SULFATE 3 ML: 2.5; .5 SOLUTION RESPIRATORY (INHALATION) at 01:11

## 2023-11-12 NOTE — PROGRESS NOTES
"[facility] Medicine  Progress Note    Patient Name: Tyra Isaac  MRN: [id number]  Patient Class: IP- Inpatient   Admission Date: 11/9/2023  Length of Stay: 2 days  Attending Physician: Humble Yee MD  Primary Care Provider: Kalpesh Goel MD        Subjective:     Principal Problem:Pneumonia        HPI:  Ms. Isaac is a 84 yo w/pmhx of ESRD on HD MWF, afib, HTN presenting w/nausea/vomiting. Reports vomiting x2 this AM after which her son called EMS. She also reports feeling SOB and having a non-productive cough this AM. Denies ever choking on food but does report she coughed up food this AM. Did not check her temperature this AM but reports her son thought she was feverish to EMS. Denies any new rashes. Does make urine but denies any hematuria or dysuria. Did have diarrhea today but reports it started after drinking the contrast for the CT.     In the ED, patient was febrile to 100.7. She was noted to be tachypnic to the high 30s by the ED. Ct noted multifocal PNA and bilateral hilar lymphadenopathy. She was given ctx/azithro.       Overview/Hospital Course:  11/10/2023  Ms Isaac is confused and easily distracted. She states that she has trouble breathing. She denies chest pain or numbness but is generally weak. " Otherwise I cannot say anything ".    11/11/2023  Ms Isaac arouses and speaks but will only say that she feels bad but cannot provide any more information. " I can breath now".    11/12/2023  Ms Isaac is much more awake alert and responsive.. She states that she has mild DUBOSE with movement          Interval History: Ms Isaac has a much improved mental state. Per PT no barrier to home care    Review of Systems   Constitutional:  Positive for activity change, diaphoresis and fatigue.   HENT: Negative.     Eyes: Negative.    Respiratory: Negative.     Cardiovascular: Negative.    Gastrointestinal: Negative.    Endocrine: Positive for cold intolerance. "   Genitourinary:  Positive for decreased urine volume.   Musculoskeletal:  Positive for arthralgias and myalgias.   Skin: Negative.    Allergic/Immunologic: Positive for immunocompromised state.   Neurological:  Positive for weakness.   Hematological:  Bruises/bleeds easily.   Psychiatric/Behavioral:  The patient is nervous/anxious.      Objective:     Vital Signs (Most Recent):  Temp: 98.3 °F (36.8 °C) (11/12/23 1122)  Pulse: (!) 57 (11/12/23 1122)  Resp: 20 (11/12/23 1122)  BP: (!) 111/54 (11/12/23 1122)  SpO2: 98 % (11/12/23 1122) Vital Signs (24h Range):  Temp:  [98.3 °F (36.8 °C)-99.4 °F (37.4 °C)] 98.3 °F (36.8 °C)  Pulse:  [56-65] 57  Resp:  [6-22] 20  SpO2:  [96 %-100 %] 98 %  BP: ()/(47-58) 111/54     Weight: 52.2 kg (115 lb 1.3 oz)  Body mass index is 19.15 kg/m².    Intake/Output Summary (Last 24 hours) at 11/12/2023 1319  Last data filed at 11/12/2023 0739  Gross per 24 hour   Intake 100 ml   Output --   Net 100 ml         Physical Exam  Vitals and nursing note reviewed.   Constitutional:       General: She is not in acute distress.  HENT:      Head: Normocephalic and atraumatic.      Nose: Nose normal.      Mouth/Throat:      Mouth: Mucous membranes are moist.   Eyes:      Extraocular Movements: Extraocular movements intact.      Pupils: Pupils are equal, round, and reactive to light.   Cardiovascular:      Rate and Rhythm: Normal rate and regular rhythm.   Pulmonary:      Effort: Pulmonary effort is normal.      Breath sounds: Normal breath sounds.   Abdominal:      General: Bowel sounds are normal.   Musculoskeletal:         General: Normal range of motion.      Cervical back: Normal range of motion and neck supple.   Skin:     General: Skin is warm.   Neurological:      Mental Status: She is alert and oriented to person, place, and time.   Psychiatric:      Comments: Improved mood and affect             Significant Labs: All pertinent labs within the past 24 hours have been reviewed.  Recent  Lab Results         11/12/23  0605        Anion Gap 12       Baso # 0.02       Basophil % 0.3       BUN 55       Calcium 8.6       Chloride 104       CO2 22       Creatinine 9.2       Differential Method Automated       eGFR 3.9       Eos # 0.5       Eosinophil % 6.2       Glucose 68       Gran # (ANC) 5.2       Gran % 66.0       Hematocrit 31.6       Hemoglobin 10.2       Immature Grans (Abs) 0.03  Comment: Mild elevation in immature granulocytes is non specific and   can be seen in a variety of conditions including stress response,   acute inflammation, trauma and pregnancy. Correlation with other   laboratory and clinical findings is essential.         Immature Granulocytes 0.4       Lymph # 0.9       Lymph % 10.8       MCH 31.3       MCHC 32.3       MCV 97       Mono # 1.3       Mono % 16.3       MPV 10.5       nRBC 0       Platelet Count 189       Potassium 4.5       RBC 3.26       RDW 15.6       Sodium 138       Vancomycin, Random 12.6       WBC 7.89               Significant Imaging: I have reviewed all pertinent imaging results/findings within the past 24 hours.      Assessment/Plan:      * Pneumonia  CT w/bilateral PNA   -speech consult to evaluate for aspiration PNA  -s/p azithro/ctx in ED   -hold further azithro for now 2/2 prolonged QTc  -cont ctx   -add vanc (ESRD - MRSA risk factor)  -MRSA nares   -procal in AM   -blood cultures   -UA to complete infectious workup   -PT/OT; pt lives alone       Nausea  -scopolamine patch   -avoid QTc prolonging agents   -recheck EKG in AM       Anemia of chronic disease  Stable   ctm     ESRD on HD via are right upper extremity AVF MWF  HD MVF  -consult nephrology- follows w/Dr. Maynard       Paroxysmal atrial fibrillation  -check INR (pt unsure if she takes eliquis or warfarin)  -continue eliquis for now   -sotalol   -re-check EKG in AM (QtC >500 and may need to stop sotalol)  -diltizem   -also had metoprolol listed on MAR but upon pharmacy tech review, last filled in  July 2023 - hold for now         VTE Risk Mitigation (From admission, onward)         Ordered     apixaban tablet 2.5 mg  2 times daily         11/09/23 2139     IP VTE HIGH RISK PATIENT  Once         11/09/23 2139     Place sequential compression device  Until discontinued         11/09/23 2139                Discharge Planning   ILAN: 11/12/2023     Code Status: Full Code   Is the patient medically ready for discharge?:     Reason for patient still in hospital (select all that apply): Treatment and Pending disposition  Discharge Plan A: Home Health                  Humble Yee MD  Department of Hospital Medicine   [facility]

## 2023-11-12 NOTE — PROGRESS NOTES
Nephrology Consult Note        Patient Name: Tyra Isaac  MRN: [id number]    Patient Class: IP- Inpatient   Admission Date: 11/9/2023  Length of Stay: 2 days  Date of Service: 11/12/2023    Attending Physician: Humble Yee MD  Primary Care Provider: Kalpesh Goel MD    Reason for Consult: esrd    SUBJECTIVE:     HPI: 84 yo w/pmhx of ESRD on HD MWF by Dr. Valero, afib, HTN presenting w/nausea/vomiting. Reports vomiting x2 this AM after which her son called EMS. She also reports feeling SOB and having a non-productive cough this AM. Denies ever choking on food but does report she coughed up food this AM. Did not check her temperature this AM but reports her son thought she was feverish to EMS. Denies any new rashes. Does make urine but denies any hematuria or dysuria. Did have diarrhea today but reports it started after drinking the contrast for the CT.      In the ED, patient was febrile to 100.7. She was noted to be tachypnic to the high 30s by the ED. Ct noted multifocal PNA and bilateral hilar lymphadenopathy. She was given ctx/azithro.     11/11 VSS, no new complains. HD on Mon or PRN.  11/12 VSS, no new complains. HD in AM.    Past Medical History:   Diagnosis Date    A-fib     Anxiety     Depression     Disorder of kidney and ureter     Encephalopathy acute 1/1/2018    End stage kidney disease 6/17/2017    Gout     Hyperlipidemia     Hypertension     Moderate episode of recurrent major depressive disorder 1/17/2018    Nephropathy hypertensive, 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    Osteopenia of multiple sites 3/9/2018    Based upon bone density measurements. Patient also has chronic kidney disease.    Stroke 11/2016     Past Surgical History:   Procedure Laterality Date    ANGIOGRAM, CORONARY, WITH LEFT HEART CATHETERIZATION N/A 2/7/2022    Procedure: Angiogram,  Coronary, with Left Heart Cath;  Surgeon: Gino Leal MD;  Location: [facility] CATH/EP LAB;  Service: Cardiology;  Laterality: N/A;    CARDIAC SURGERY      stents    EYE SURGERY      WRIST SURGERY       Family History   Problem Relation Age of Onset    Heart disease Mother     Cancer Father      Social History     Tobacco Use    Smoking status: Never    Smokeless tobacco: Never   Substance Use Topics    Alcohol use: No    Drug use: No       Review of patient's allergies indicates:   Allergen Reactions    Cyclobenzaprine     Fish containing products Hives    Peanut Other (See Comments)    Tramadol Itching       Outpatient meds:  No current facility-administered medications on file prior to encounter.     Current Outpatient Medications on File Prior to Encounter   Medication Sig Dispense Refill    atorvastatin (LIPITOR) 40 MG tablet Take 40 mg by mouth once daily.      b complex vitamins tablet Take 1 tablet by mouth once daily.      calcium acetate,phosphat bind, (PHOSLO) 667 mg tablet Take 667 mg by mouth 2 (two) times daily with meals.      diltiaZEM (CARDIZEM CD) 120 MG Cp24 Take 1 capsule (120 mg total) by mouth 2 (two) times a day.      docusate sodium (COLACE) 100 MG capsule Take 100 mg by mouth once daily.      dorzolamide-timolol 2-0.5% (COSOPT) 22.3-6.8 mg/mL ophthalmic solution Place 1 drop into both eyes 2 (two) times daily.      ELIQUIS 2.5 mg Tab Take 2.5 mg by mouth 2 (two) times daily.      latanoprost 0.005 % ophthalmic solution Place 1 drop into both eyes every evening.      metoprolol tartrate (LOPRESSOR) 25 MG tablet Take 1 tablet (25 mg total) by mouth 2 (two) times daily. 60 tablet 11    ondansetron (ZOFRAN) 4 MG tablet Take 1 tablet (4 mg total) by mouth every 8 (eight) hours as needed for Nausea. 15 tablet 0    sotaloL (BETAPACE) 80 MG tablet Take 80 mg by mouth 2 (two) times daily. Take 1/2 tablet (40mg) BID         Scheduled meds:   albuterol-ipratropium  3 mL Nebulization Q6H    apixaban   2.5 mg Oral BID    atorvastatin  40 mg Oral Daily    calcium acetate(phosphat bind)  667 mg Oral BID WM    cefTRIAXone (ROCEPHIN) IVPB  1 g Intravenous Q24H    diltiaZEM  120 mg Oral BID    dorzolamide-timolol 2-0.5%  1 drop Both Eyes BID    latanoprost  1 drop Both Eyes QHS    mupirocin   Nasal BID    sotaloL  80 mg Oral BID    [START ON 11/13/2023] vancomycin (VANCOCIN) IV (PEDS and ADULTS)  500 mg Intravenous Once       Infusions:      PRN meds:  acetaminophen, melatonin, promethazine (PHENERGAN) 12.5 mg in dextrose 5 % (D5W) 50 mL IVPB, sodium chloride 0.9%, Pharmacy to dose Vancomycin consult **AND** vancomycin - pharmacy to dose    Review of Systems:    OBJECTIVE:     Vital Signs and IO:  Temp:  [98.8 °F (37.1 °C)-99.2 °F (37.3 °C)]   Pulse:  [56-65]   Resp:  [6-20]   BP: ()/(51-58)   SpO2:  [96 %-100 %]   I/O last 3 completed shifts:  In: 220 [P.O.:120; I.V.:100]  Out: -   Wt Readings from Last 5 Encounters:   11/09/23 52.2 kg (115 lb 1.3 oz)   07/22/23 52.2 kg (115 lb)   07/18/23 51.5 kg (113 lb 9.6 oz)   06/28/23 49.9 kg (110 lb)   03/31/23 51.3 kg (113 lb)     Body mass index is 19.15 kg/m².    Physical Exam  Constitutional:       General: Patient is not in acute distress.     Appearance: Patient is well-developed. She is not diaphoretic.   HENT:      Head: Normocephalic and atraumatic.      Mouth/Throat: Mucous membranes are moist.   Eyes:      General: No scleral icterus.     Pupils: Pupils are equal, round, and reactive to light.   Cardiovascular:      Rate and Rhythm: Normal rate and regular rhythm.   Pulmonary:      Effort: Pulmonary effort is normal. No respiratory distress.      Breath sounds: No stridor.   Abdominal:      General: There is no distension.      Palpations: Abdomen is soft.   Musculoskeletal:         General: No deformity. Normal range of motion.      Cervical back: Neck supple.   Skin:     General: Skin is warm and dry.      Findings: No rash present. No erythema.  "  Neurological:      Mental Status: Patient is alert and oriented to person, place, and time.      Cranial Nerves: No cranial nerve deficit.   Psychiatric:         Behavior: Behavior normal.     Laboratory:  Recent Labs   Lab 11/10/23  0509 11/11/23  0355 11/12/23  0605    138 138   K 3.6 4.5 4.5   CL 94* 103 104   CO2 31* 23 22*   BUN 49* 32* 55*   CREATININE 8.1* 6.3* 9.2*   GLU 72 73 68*         Recent Labs   Lab 11/09/23  1705 11/10/23  0509 11/11/23  0355 11/12/23  0605   CALCIUM 8.2* 7.8* 8.8 8.6*   ALBUMIN 3.7  --   --   --                No results for input(s): "POCTGLUCOSE" in the last 168 hours.    Recent Labs   Lab 01/26/22  1525   Hemoglobin A1C 5.3         Recent Labs   Lab 11/10/23  0509 11/11/23  0355 11/12/23  0605   WBC 6.50 11.05 7.89   HGB 10.0* 10.2* 10.2*   HCT 30.9* 32.2* 31.6*    194 189   MCV 96 97 97   MCHC 32.4 31.7* 32.3   MONO 19.4*  1.3* 14.3  1.6* 16.3*  1.3*   EOSINOPHIL 5.1 0.3 6.2         Recent Labs   Lab 11/09/23  1705   BILITOT 1.1*   PROT 6.6   ALBUMIN 3.7   ALKPHOS 54*   ALT 20   AST 25         Recent Labs   Lab 02/02/22  1605 03/31/23  1046 11/09/23  2028   Color, UA Yellow Yellow Yellow   Appearance, UA Clear Clear Clear   pH, UA >8.0 A 8.0 >8.0 A   Specific [place], UA 1.005 1.010 1.015   Protein, UA 1+ A 1+ A 2+ A   Glucose, UA Negative Negative Trace A   Ketones, UA Negative Negative Negative   Urobilinogen, UA Negative Negative 2.0-3.0 A   Bilirubin (UA) Negative Negative Negative   Occult Blood UA Negative Negative Trace A   Nitrite, UA Negative Negative Negative   RBC, UA 1 0 1   WBC, UA 1 0 0   Bacteria Negative Negative Negative   Hyaline Casts, UA 3 A 1 3 A         Recent Labs   Lab 11/10/23  1518 11/10/23  1529   POC PH 7.395 7.391   POC PCO2 36.5 37.2   POC HCO3 22.4 L 22.6 L   POC PO2 54 LL 53 LL   POC SATURATED O2 88 87   POC BE -3 L -2   Sample ARTERIAL ARTERIAL         Microbiology Results (last 7 days)       Procedure Component Value Units " Date/Time    Blood Culture #1 **CANNOT BE ORDERED STAT** [[id number]] Collected: 11/09/23 2121    Order Status: Completed Specimen: Blood Updated: 11/11/23 2232     Blood Culture, Routine No Growth to date      No Growth to date      No Growth to date    Blood Culture #2 **CANNOT BE ORDERED STAT** [[id number]] Collected: 11/09/23 2201    Order Status: Completed Specimen: Blood Updated: 11/11/23 2232     Blood Culture, Routine No Growth to date      No Growth to date      No Growth to date    Culture, Respiratory with Gram Stain [[id number]]     Order Status: No result Specimen: Respiratory     MRSA Screen by PCR [[id number]] Collected: 11/09/23 2207    Order Status: Completed Specimen: Nasopharyngeal Swab from Nasal Updated: 11/09/23 2343     MRSA SCREEN BY PCR Negative            ASSESSMENT/PLAN:     ESRD on HD MWF via [facility]  Next HD per schedule.  Continue current dialysis prescription.  Renal diet - low K, low phos.  No IVs or BP checks on access and/or non-dominant arm.    Anemia of CKD  Hgb and HCT are acceptable. Monitor for now.  Will provide BRAYAN and/or IV iron PRN.    MBD / Secondary HPT  Ca, Phos, PTH and vitamin D levels are acceptable.   Phos binders, vitamin D and analogues, calcimimetics will be given as needed.    HTN  BP seem controlled.   Tolerate asymptomatic HTN up to -160.  Continue home meds.  Low sodium diet.    PNA  Management per primary team.    Thank you for allowing us to participate in the care of your patient!   We will follow the patient and provide recommendations as needed.    Patient care time was spent personally by me on the following activities:     Obtaining a history.  Examination of patient.  Providing medical care at the patients bedside.  Developing a treatment plan with patient or surrogate and bedside caregivers.  Ordering and reviewing laboratory studies, radiographic studies, pulse oximetry.  Ordering and performing treatments and interventions.  Evaluation of  patient's response to treatment.  Discussions with consultants while on the unit and immediately available to the patient.  Re-evaluation of the patient's condition.  Documentation in the medical record.     Raleigh Yee MD    [facility]  [address] [place] [address] [place] [address]    [phone] - tel  [phone] - fax    11/12/2023

## 2023-11-12 NOTE — PT/OT/SLP EVAL
"Physical Therapy Evaluation    Patient Name:  Tyra Isaac   MRN:  [id number]    Recommendations:     Discharge Recommendations: Moderate Intensity Therapy   Discharge Equipment Recommendations: to be determined by next level of care   Barriers to discharge: None    Assessment:     Tyra Isaac is a 83 y.o. female admitted with a medical diagnosis of Pneumonia.  She presents with the following impairments/functional limitations: weakness, impaired endurance, impaired self care skills, impaired functional mobility, gait instability, impaired balance, decreased lower extremity function, impaired cardiopulmonary response to activity. Patient is agreeable to participation with PT evaluation. She denies pain at rest. Ahe is A/Ox4. She lives alone, but her son has been staying with her. She has a rollator, but states she does not use an AD for ambulation at baseline. BP at rest of 111/47 with patient reporting some dizziness. She requires Karen for supine to sit with sitting BP of 111/62. She requires Karen for sit to stand with RW. She ambulated 20' in room with RW, Karen, small step length, and slow gait speed with patient stating "I am getting weaker by the minute". She is agreeable to sit up in chair post-ambulation to eat her grits with chair alarm on and RN notified.     Rehab Prognosis: Good; patient would benefit from acute skilled PT services to address these deficits and reach maximum level of function.    Recent Surgery: * No surgery found *      Plan:     During this hospitalization, patient to be seen 5 x/week to address the identified rehab impairments via gait training, therapeutic activities, therapeutic exercises and progress toward the following goals:    Plan of Care Expires:  12/12/23    Subjective     Chief Complaint: weakness   Patient/Family Comments/goals: agrees to sit up in chair   Pain/Comfort:  Pain Rating 1: 0/10    Patients cultural, spiritual, Mandaen conflicts given the current " situation:      Living Environment:  Patient lives alone, but son has been staying with her in a SSH with no KEY.   Prior to admission, patients level of function was no AD at baseline. She denies any recent falls or PT. She does not drive.  Equipment used at home: rollator.  DME owned (not currently used): none.  Upon discharge, patient will have assistance from family.    Objective:     Communicated with RN Brooklynn prior to session.  Patient found HOB elevated with blood pressure cuff, telemetry, pulse ox (continuous)  upon PT entry to room.    General Precautions: Standard, fall  Orthopedic Precautions:N/A   Braces: N/A  Respiratory Status: Room air    Exams:  RLE Strength: WFL  LLE Strength: WFL    Functional Mobility:  Bed Mobility:     Supine to Sit: minimum assistance  Transfers:     Sit to Stand:  minimum assistance with rolling walker  Gait: 20' in room with RW, Karen, small step length, and slow gait speed       AM-PAC 6 CLICK MOBILITY  Total Score:16       Treatment & Education:  Patient was educated on the importance of OOB activity and functional mobility to negate negative effects of prolonged bed rest during hospitalization, safe transfers and ambulation, and D/C planning     Patient left up in chair with all lines intact, call button in reach, chair alarm on, and RN notified.    GOALS:   Multidisciplinary Problems       Physical Therapy Goals          Problem: Physical Therapy    Goal Priority Disciplines Outcome Goal Variances Interventions   Physical Therapy Goal     PT, PT/OT      Description: Goals to be met by: 23    Patient will increase functional independence with mobility by performin. Supine to sit with Supervision  2. Sit to stand transfer with Supervision  3. Bed to chair transfer with Supervision using Rolling Walker  4. Gait  x 250 feet with Supervision using Rolling Walker.   5. Lower extremity exercise program x20 reps per handout, with supervision                        History:     Past Medical History:   Diagnosis Date    A-fib     Anxiety     Depression     Disorder of kidney and ureter     Encephalopathy acute 1/1/2018    End stage kidney disease 6/17/2017    Gout     Hyperlipidemia     Hypertension     Moderate episode of recurrent major depressive disorder 1/17/2018    Nephropathy hypertensive, 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    Osteopenia of multiple sites 3/9/2018    Based upon bone density measurements. Patient also has chronic kidney disease.    Stroke 11/2016       Past Surgical History:   Procedure Laterality Date    ANGIOGRAM, CORONARY, WITH LEFT HEART CATHETERIZATION N/A 2/7/2022    Procedure: Angiogram, Coronary, with Left Heart Cath;  Surgeon: Gino Leal MD;  Location: [facility] CATH/EP LAB;  Service: Cardiology;  Laterality: N/A;    CARDIAC SURGERY      stents    EYE SURGERY      WRIST SURGERY         Time Tracking:     PT Received On: 11/12/23  PT Start Time: 1010     PT Stop Time: 1025  PT Total Time (min): 15 min     Billable Minutes: Evaluation 15      11/12/2023

## 2023-11-12 NOTE — PROGRESS NOTES
VANCOMYCIN PHARMACOKINETIC NOTE:  Vancomycin Day #4    Objective:    83 y.o., female, Actual Body Weight = 52.2 kg (115 lb 1.3 oz)    Diagnosis/Indication for Vancomycin:  Pneumonia   Desired Vancomycin Peak:  30-35 mcg/ml; Desired Trough: 10-15 mcg/ml    Current Vancomycin Regimen:   500 mg IV intermittent    Current Dosing History   Day of Thx Date Current Weight (kg) Laboratory  Doses    Vancomycin Level Progress Note on Chart Comments      Time SCr CrCl Scheduled Time Time Dose Dosage (mcg/ml) Peak,  Random,  Trough?   1 11/9 52.2 17:05 7.8 4.5 Vancomycin 1250 once    HD on MWF            23:00 23:05 1 1250          2 11/10 52.2 05:09 8.1 4.3 22:00 22:23 - - 15.4 Random  HD: 5595-8144   3 11/11 52.2 03:55 6.3 5.6              4 11/12     AM 06:05 - - 12.6 Random         Receiving other antibiotics:    Antibiotics (From admission, onward)      Start     Stop Route Frequency Ordered    11/13/23 2100  vancomycin 500 mg in dextrose 5 % 100 mL IVPB (ready to mix)         -- IV Once 11/12/23 0731    11/10/23 2130  cefTRIAXone (ROCEPHIN) 1 g in dextrose 5 % 100 mL IVPB (ready to mix)         -- IV Every 24 hours (non-standard times) 11/09/23 2131    11/10/23 0900  mupirocin 2 % ointment         11/15/23 0859 Nasl 2 times daily 11/10/23 0800    11/09/23 2235  vancomycin - pharmacy to dose  (vancomycin IVPB (PEDS and ADULTS))        See Hyperspace for full Linked Orders Report.    -- IV pharmacy to manage frequency 11/09/23 2135             The patient has the following labs:     11/12/2023 Estimated Creatinine Clearance: 3.8 mL/min (A) (based on SCr of 9.2 mg/dL (H)). Lab Results   Component Value Date    BUN 55 (H) 11/12/2023     Lab Results   Component Value Date    WBC 7.89 11/12/2023        Patient receives hemodialysis on Mondays, Wednesdays, and Fridays    Random vancomycin:  12.6 mcg/mL collected 56 hours after Dose #1    Microbiology Results (last 7 days)       Procedure Component Value Units Date/Time    Blood  Culture #1 **CANNOT BE ORDERED STAT** [[id number]] Collected: 11/09/23 2121    Order Status: Completed Specimen: Blood Updated: 11/11/23 2232     Blood Culture, Routine No Growth to date      No Growth to date      No Growth to date    Blood Culture #2 **CANNOT BE ORDERED STAT** [[id number]] Collected: 11/09/23 2201    Order Status: Completed Specimen: Blood Updated: 11/11/23 2232     Blood Culture, Routine No Growth to date      No Growth to date      No Growth to date    Culture, Respiratory with Gram Stain [[id number]]     Order Status: No result Specimen: Respiratory     MRSA Screen by PCR [[id number]] Collected: 11/09/23 2207    Order Status: Completed Specimen: Nasopharyngeal Swab from Nasal Updated: 11/09/23 2343     MRSA SCREEN BY PCR Negative             Assessment:  Vancomycin dose =  24 mg/kg  Renal function is: dependent on dialysis.  Vancomycin elimination is dependent on dialysis.  No future vancomycin doses are warranted in next 24 hours due to poor renal function.  Vancomycin half-life is prolonged with low renal function.  Serum vancomycin levels will decrease slowly.  Vancomycin trough is within goal range.      Plan:  No change to current vancomycin regimen  Dosing schedule coordinated with Next HD  Will obtain random vancomycin level with next hemodialysis    Pharmacy will continue to manage vancomycin therapy and adjust regimen as necessary.    Thank you for allowing us to participate in this patient's care.     Stephanie Wing 11/12/2023 7:32 AM  Department of Pharmacy  Ext [phone]

## 2023-11-12 NOTE — PLAN OF CARE
Problem: Adult Inpatient Plan of Care  Goal: Plan of Care Review  Outcome: Ongoing, Progressing  Goal: Patient-Specific Goal (Individualized)  Outcome: Ongoing, Progressing  Goal: Absence of Hospital-Acquired Illness or Injury  Outcome: Ongoing, Progressing  Goal: Optimal Comfort and Wellbeing  Outcome: Ongoing, Progressing  Goal: Readiness for Transition of Care  Outcome: Ongoing, Progressing     Problem: Fluid Imbalance (Pneumonia)  Goal: Fluid Balance  Outcome: Ongoing, Progressing     Problem: Infection (Pneumonia)  Goal: Resolution of Infection Signs and Symptoms  Outcome: Ongoing, Progressing     Problem: Respiratory Compromise (Pneumonia)  Goal: Effective Oxygenation and Ventilation  Outcome: Ongoing, Progressing     Problem: Fall Injury Risk  Goal: Absence of Fall and Fall-Related Injury  Outcome: Ongoing, Progressing     Problem: Device-Related Complication Risk (Hemodialysis)  Goal: Safe, Effective Therapy Delivery  Outcome: Ongoing, Progressing     Problem: Hemodynamic Instability (Hemodialysis)  Goal: Effective Tissue Perfusion  Outcome: Ongoing, Progressing     Problem: Infection (Hemodialysis)  Goal: Absence of Infection Signs and Symptoms  Outcome: Ongoing, Progressing     Problem: Skin Injury Risk Increased  Goal: Skin Health and Integrity  Outcome: Ongoing, Progressing

## 2023-11-12 NOTE — PROGRESS NOTES
Therapy with Vancomycin order discontinued by Dr. Yee on 11/12/23 @ 15:50.   Pharmacy will sign off, please re-consult as needed.    Thank you for allowing us to participate in this patient's care.  Nancy Silva 11/12/2023 3:54 PM  Dept of Pharmacy  Ext [phone]

## 2023-11-12 NOTE — SUBJECTIVE & OBJECTIVE
Interval History: Ms Isaac has a much improved mental state. Per PT no barrier to home care    Review of Systems   Constitutional:  Positive for activity change, diaphoresis and fatigue.   HENT: Negative.     Eyes: Negative.    Respiratory: Negative.     Cardiovascular: Negative.    Gastrointestinal: Negative.    Endocrine: Positive for cold intolerance.   Genitourinary:  Positive for decreased urine volume.   Musculoskeletal:  Positive for arthralgias and myalgias.   Skin: Negative.    Allergic/Immunologic: Positive for immunocompromised state.   Neurological:  Positive for weakness.   Hematological:  Bruises/bleeds easily.   Psychiatric/Behavioral:  The patient is nervous/anxious.      Objective:     Vital Signs (Most Recent):  Temp: 98.3 °F (36.8 °C) (11/12/23 1122)  Pulse: (!) 57 (11/12/23 1122)  Resp: 20 (11/12/23 1122)  BP: (!) 111/54 (11/12/23 1122)  SpO2: 98 % (11/12/23 1122) Vital Signs (24h Range):  Temp:  [98.3 °F (36.8 °C)-99.4 °F (37.4 °C)] 98.3 °F (36.8 °C)  Pulse:  [56-65] 57  Resp:  [6-22] 20  SpO2:  [96 %-100 %] 98 %  BP: ()/(47-58) 111/54     Weight: 52.2 kg (115 lb 1.3 oz)  Body mass index is 19.15 kg/m².    Intake/Output Summary (Last 24 hours) at 11/12/2023 1319  Last data filed at 11/12/2023 0739  Gross per 24 hour   Intake 100 ml   Output --   Net 100 ml         Physical Exam  Vitals and nursing note reviewed.   Constitutional:       General: She is not in acute distress.  HENT:      Head: Normocephalic and atraumatic.      Nose: Nose normal.      Mouth/Throat:      Mouth: Mucous membranes are moist.   Eyes:      Extraocular Movements: Extraocular movements intact.      Pupils: Pupils are equal, round, and reactive to light.   Cardiovascular:      Rate and Rhythm: Normal rate and regular rhythm.   Pulmonary:      Effort: Pulmonary effort is normal.      Breath sounds: Normal breath sounds.   Abdominal:      General: Bowel sounds are normal.   Musculoskeletal:         General: Normal  range of motion.      Cervical back: Normal range of motion and neck supple.   Skin:     General: Skin is warm.   Neurological:      Mental Status: She is alert and oriented to person, place, and time.   Psychiatric:      Comments: Improved mood and affect             Significant Labs: All pertinent labs within the past 24 hours have been reviewed.  Recent Lab Results         11/12/23  0605        Anion Gap 12       Baso # 0.02       Basophil % 0.3       BUN 55       Calcium 8.6       Chloride 104       CO2 22       Creatinine 9.2       Differential Method Automated       eGFR 3.9       Eos # 0.5       Eosinophil % 6.2       Glucose 68       Gran # (ANC) 5.2       Gran % 66.0       Hematocrit 31.6       Hemoglobin 10.2       Immature Grans (Abs) 0.03  Comment: Mild elevation in immature granulocytes is non specific and   can be seen in a variety of conditions including stress response,   acute inflammation, trauma and pregnancy. Correlation with other   laboratory and clinical findings is essential.         Immature Granulocytes 0.4       Lymph # 0.9       Lymph % 10.8       MCH 31.3       MCHC 32.3       MCV 97       Mono # 1.3       Mono % 16.3       MPV 10.5       nRBC 0       Platelet Count 189       Potassium 4.5       RBC 3.26       RDW 15.6       Sodium 138       Vancomycin, Random 12.6       WBC 7.89               Significant Imaging: I have reviewed all pertinent imaging results/findings within the past 24 hours.

## 2023-11-12 NOTE — CARE UPDATE
11/11/23 2003   Patient Assessment/Suction   Level of Consciousness (AVPU) alert   Respiratory Effort Normal;Unlabored   Expansion/Accessory Muscles/Retractions no use of accessory muscles   All Lung Fields Breath Sounds diminished;clear   Cough Frequency no cough   PRE-TX-O2   Device (Oxygen Therapy) room air   SpO2 100 %   Pulse Oximetry Type Intermittent   $ Pulse Oximetry - Single Charge Pulse Oximetry - Single   $ Pulse Oximetry - Multiple Charge Pulse Oximetry - Multiple   Pulse (!) 58   Resp 16   Positioning HOB elevated 30 degrees   Positioning   Head of Bed (HOB) Positioning HOB elevated   Aerosol Therapy   $ Aerosol Therapy Charges Aerosol Treatment   Daily Review of Necessity (SVN) completed   Respiratory Treatment Status (SVN) given   Treatment Route (SVN) mask;oxygen   Patient Position (SVN) HOB elevated   Post Treatment Assessment (SVN) breath sounds unchanged   Signs of Intolerance (SVN) none   Education   $ Education Bronchodilator;15 min   Respiratory Evaluation   $ Care Plan Tech Time 15 min   $ Eval/Re-eval Charges Evaluation

## 2023-11-13 LAB
ANION GAP SERPL CALC-SCNC: 13 MMOL/L (ref 8–16)
BASOPHILS # BLD AUTO: 0.03 K/UL (ref 0–0.2)
BASOPHILS NFR BLD: 0.5 % (ref 0–1.9)
BUN SERPL-MCNC: 71 MG/DL (ref 8–23)
CALCIUM SERPL-MCNC: 8.2 MG/DL (ref 8.7–10.5)
CHLORIDE SERPL-SCNC: 103 MMOL/L (ref 95–110)
CO2 SERPL-SCNC: 22 MMOL/L (ref 23–29)
CREAT SERPL-MCNC: 10.7 MG/DL (ref 0.5–1.4)
DIFFERENTIAL METHOD: ABNORMAL
EOSINOPHIL # BLD AUTO: 0.8 K/UL (ref 0–0.5)
EOSINOPHIL NFR BLD: 11.9 % (ref 0–8)
ERYTHROCYTE [DISTWIDTH] IN BLOOD BY AUTOMATED COUNT: 15.3 % (ref 11.5–14.5)
EST. GFR  (NO RACE VARIABLE): 3.3 ML/MIN/1.73 M^2
GLUCOSE SERPL-MCNC: 70 MG/DL (ref 70–110)
HCT VFR BLD AUTO: 30.7 % (ref 37–48.5)
HGB BLD-MCNC: 9.9 G/DL (ref 12–16)
IMM GRANULOCYTES # BLD AUTO: 0.02 K/UL (ref 0–0.04)
IMM GRANULOCYTES NFR BLD AUTO: 0.3 % (ref 0–0.5)
LYMPHOCYTES # BLD AUTO: 0.9 K/UL (ref 1–4.8)
LYMPHOCYTES NFR BLD: 14.1 % (ref 18–48)
MCH RBC QN AUTO: 31.2 PG (ref 27–31)
MCHC RBC AUTO-ENTMCNC: 32.2 G/DL (ref 32–36)
MCV RBC AUTO: 97 FL (ref 82–98)
MONOCYTES # BLD AUTO: 0.9 K/UL (ref 0.3–1)
MONOCYTES NFR BLD: 14.6 % (ref 4–15)
NEUTROPHILS # BLD AUTO: 3.7 K/UL (ref 1.8–7.7)
NEUTROPHILS NFR BLD: 58.6 % (ref 38–73)
NRBC BLD-RTO: 0 /100 WBC
PLATELET # BLD AUTO: 201 K/UL (ref 150–450)
PMV BLD AUTO: 10.8 FL (ref 9.2–12.9)
POTASSIUM SERPL-SCNC: 4.5 MMOL/L (ref 3.5–5.1)
RBC # BLD AUTO: 3.17 M/UL (ref 4–5.4)
SODIUM SERPL-SCNC: 138 MMOL/L (ref 136–145)
WBC # BLD AUTO: 6.31 K/UL (ref 3.9–12.7)

## 2023-11-13 PROCEDURE — 12000002 HC ACUTE/MED SURGE SEMI-PRIVATE ROOM

## 2023-11-13 PROCEDURE — 94640 AIRWAY INHALATION TREATMENT: CPT

## 2023-11-13 PROCEDURE — 36415 COLL VENOUS BLD VENIPUNCTURE: CPT | Performed by: INTERNAL MEDICINE

## 2023-11-13 PROCEDURE — 25000003 PHARM REV CODE 250: Performed by: INTERNAL MEDICINE

## 2023-11-13 PROCEDURE — 85025 COMPLETE CBC W/AUTO DIFF WBC: CPT | Performed by: INTERNAL MEDICINE

## 2023-11-13 PROCEDURE — 94761 N-INVAS EAR/PLS OXIMETRY MLT: CPT

## 2023-11-13 PROCEDURE — 80048 BASIC METABOLIC PNL TOTAL CA: CPT | Performed by: INTERNAL MEDICINE

## 2023-11-13 PROCEDURE — 99900031 HC PATIENT EDUCATION (STAT)

## 2023-11-13 PROCEDURE — 90935 HEMODIALYSIS ONE EVALUATION: CPT

## 2023-11-13 PROCEDURE — 25000242 PHARM REV CODE 250 ALT 637 W/ HCPCS: Performed by: INTERNAL MEDICINE

## 2023-11-13 RX ADMIN — LATANOPROST 1 DROP: 50 SOLUTION OPHTHALMIC at 08:11

## 2023-11-13 RX ADMIN — DORZOLAMIDE HYDROCHLORIDE AND TIMOLOL MALEATE 1 DROP: 22.3; 6.8 SOLUTION/ DROPS OPHTHALMIC at 08:11

## 2023-11-13 RX ADMIN — IPRATROPIUM BROMIDE AND ALBUTEROL SULFATE 3 ML: 2.5; .5 SOLUTION RESPIRATORY (INHALATION) at 01:11

## 2023-11-13 RX ADMIN — MUPIROCIN 1 G: 20 OINTMENT TOPICAL at 08:11

## 2023-11-13 RX ADMIN — CALCIUM ACETATE 667 MG: 667 CAPSULE ORAL at 05:11

## 2023-11-13 RX ADMIN — CALCIUM ACETATE 667 MG: 667 CAPSULE ORAL at 08:11

## 2023-11-13 RX ADMIN — APIXABAN 2.5 MG: 2.5 TABLET, FILM COATED ORAL at 08:11

## 2023-11-13 RX ADMIN — DOXYCYCLINE HYCLATE 100 MG: 100 CAPSULE ORAL at 08:11

## 2023-11-13 RX ADMIN — IPRATROPIUM BROMIDE AND ALBUTEROL SULFATE 3 ML: 2.5; .5 SOLUTION RESPIRATORY (INHALATION) at 07:11

## 2023-11-13 RX ADMIN — IPRATROPIUM BROMIDE AND ALBUTEROL SULFATE 3 ML: 2.5; .5 SOLUTION RESPIRATORY (INHALATION) at 08:11

## 2023-11-13 NOTE — PROGRESS NOTES
Nephrology Consult Note        Patient Name: Tyra Isaac  MRN: [id number]    Patient Class: IP- Inpatient   Admission Date: 11/9/2023  Length of Stay: 3 days  Date of Service: 11/13/2023    Attending Physician: Humble Yee MD  Primary Care Provider: Kalpesh Goel MD    Reason for Consult: esrd    SUBJECTIVE:     HPI: 82 yo w/pmhx of ESRD on HD MWF by Dr. Valero, afib, HTN presenting w/nausea/vomiting. Reports vomiting x2 this AM after which her son called EMS. She also reports feeling SOB and having a non-productive cough this AM. Denies ever choking on food but does report she coughed up food this AM. Did not check her temperature this AM but reports her son thought she was feverish to EMS. Denies any new rashes. Does make urine but denies any hematuria or dysuria. Did have diarrhea today but reports it started after drinking the contrast for the CT.      In the ED, patient was febrile to 100.7. She was noted to be tachypnic to the high 30s by the ED. Ct noted multifocal PNA and bilateral hilar lymphadenopathy. She was given ctx/azithro.     11/11 VSS, no new complains. HD on Mon or PRN.  11/12 VSS, no new complains. HD in AM.  11/13  Patient seen on hemodialysis for uremic clearance and ultrafiltration.        Past Medical History:   Diagnosis Date    A-fib     Anxiety     Depression     Disorder of kidney and ureter     Encephalopathy acute 1/1/2018    End stage kidney disease 6/17/2017    Gout     Hyperlipidemia     Hypertension     Moderate episode of recurrent major depressive disorder 1/17/2018    Nephropathy hypertensive, 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    Osteopenia of multiple sites 3/9/2018    Based upon bone density measurements. Patient also has chronic kidney disease.    Stroke 11/2016     Past Surgical History:   Procedure Laterality Date    ANGIOGRAM,  CORONARY, WITH LEFT HEART CATHETERIZATION N/A 2/7/2022    Procedure: Angiogram, Coronary, with Left Heart Cath;  Surgeon: Gino Leal MD;  Location: [facility] CATH/EP LAB;  Service: Cardiology;  Laterality: N/A;    CARDIAC SURGERY      stents    EYE SURGERY      WRIST SURGERY       Family History   Problem Relation Age of Onset    Heart disease Mother     Cancer Father      Social History     Tobacco Use    Smoking status: Never    Smokeless tobacco: Never   Substance Use Topics    Alcohol use: No    Drug use: No       Review of patient's allergies indicates:   Allergen Reactions    Cyclobenzaprine     Fish containing products Hives    Peanut Other (See Comments)    Tramadol Itching       Outpatient meds:  No current facility-administered medications on file prior to encounter.     Current Outpatient Medications on File Prior to Encounter   Medication Sig Dispense Refill    atorvastatin (LIPITOR) 40 MG tablet Take 40 mg by mouth once daily.      b complex vitamins tablet Take 1 tablet by mouth once daily.      calcium acetate,phosphat bind, (PHOSLO) 667 mg tablet Take 667 mg by mouth 2 (two) times daily with meals.      diltiaZEM (CARDIZEM CD) 120 MG Cp24 Take 1 capsule (120 mg total) by mouth 2 (two) times a day.      docusate sodium (COLACE) 100 MG capsule Take 100 mg by mouth once daily.      dorzolamide-timolol 2-0.5% (COSOPT) 22.3-6.8 mg/mL ophthalmic solution Place 1 drop into both eyes 2 (two) times daily.      ELIQUIS 2.5 mg Tab Take 2.5 mg by mouth 2 (two) times daily.      latanoprost 0.005 % ophthalmic solution Place 1 drop into both eyes every evening.      metoprolol tartrate (LOPRESSOR) 25 MG tablet Take 1 tablet (25 mg total) by mouth 2 (two) times daily. 60 tablet 11    ondansetron (ZOFRAN) 4 MG tablet Take 1 tablet (4 mg total) by mouth every 8 (eight) hours as needed for Nausea. 15 tablet 0    sotaloL (BETAPACE) 80 MG tablet Take 80 mg by mouth 2 (two) times daily. Take 1/2 tablet (40mg) BID          Scheduled meds:   albuterol-ipratropium  3 mL Nebulization Q6H    apixaban  2.5 mg Oral BID    atorvastatin  40 mg Oral Daily    calcium acetate(phosphat bind)  667 mg Oral BID WM    diltiaZEM  120 mg Oral BID    dorzolamide-timolol 2-0.5%  1 drop Both Eyes BID    doxycycline  100 mg Oral Q12H    latanoprost  1 drop Both Eyes QHS    mupirocin   Nasal BID    sotaloL  80 mg Oral BID       Infusions:      PRN meds:  acetaminophen, melatonin, promethazine (PHENERGAN) 12.5 mg in dextrose 5 % (D5W) 50 mL IVPB, sodium chloride 0.9%    Review of Systems:    OBJECTIVE:     Vital Signs and IO:  Temp:  [97.7 °F (36.5 °C)-99.4 °F (37.4 °C)]   Pulse:  [57-70]   Resp:  [14-22]   BP: (104-132)/(47-63)   SpO2:  [96 %-100 %]   I/O last 3 completed shifts:  In: 560 [P.O.:510; IV Piggyback:50]  Out: -   Wt Readings from Last 5 Encounters:   11/09/23 52.2 kg (115 lb 1.3 oz)   07/22/23 52.2 kg (115 lb)   07/18/23 51.5 kg (113 lb 9.6 oz)   06/28/23 49.9 kg (110 lb)   03/31/23 51.3 kg (113 lb)     Body mass index is 19.15 kg/m².    Physical Exam  Constitutional:       General: Patient is not in acute distress.     Appearance: Patient is well-developed. She is not diaphoretic.   HENT:      Head: Normocephalic and atraumatic.      Mouth/Throat: Mucous membranes are moist.   Eyes:      General: No scleral icterus.     Pupils: Pupils are equal, round, and reactive to light.   Cardiovascular:      Rate and Rhythm: Normal rate and regular rhythm.   Pulmonary:      Effort: Pulmonary effort is normal. No respiratory distress.      Breath sounds: No stridor.   Abdominal:      General: There is no distension.      Palpations: Abdomen is soft.   Musculoskeletal:         General: No deformity. Normal range of motion.      Cervical back: Neck supple.   Skin:     General: Skin is warm and dry.      Findings: No rash present. No erythema.   Neurological:      Mental Status: Patient is alert and oriented to person, place, and time.      Cranial  "Nerves: No cranial nerve deficit.   Psychiatric:         Behavior: Behavior normal.     Laboratory:  Recent Labs   Lab 11/11/23  0355 11/12/23  0605 11/13/23  0412    138 138   K 4.5 4.5 4.5    104 103   CO2 23 22* 22*   BUN 32* 55* 71*   CREATININE 6.3* 9.2* 10.7*   GLU 73 68* 70         Recent Labs   Lab 11/09/23  1705 11/10/23  0509 11/11/23  0355 11/12/23  0605 11/13/23  0412   CALCIUM 8.2*   < > 8.8 8.6* 8.2*   ALBUMIN 3.7  --   --   --   --     < > = values in this interval not displayed.               No results for input(s): "POCTGLUCOSE" in the last 168 hours.    Recent Labs   Lab 01/26/22  1525   Hemoglobin A1C 5.3         Recent Labs   Lab 11/11/23  0355 11/12/23  0605 11/13/23  0412   WBC 11.05 7.89 6.31   HGB 10.2* 10.2* 9.9*   HCT 32.2* 31.6* 30.7*    189 201   MCV 97 97 97   MCHC 31.7* 32.3 32.2   MONO 14.3  1.6* 16.3*  1.3* 14.6  0.9   EOSINOPHIL 0.3 6.2 11.9*         Recent Labs   Lab 11/09/23  1705   BILITOT 1.1*   PROT 6.6   ALBUMIN 3.7   ALKPHOS 54*   ALT 20   AST 25         Recent Labs   Lab 02/02/22  1605 03/31/23  1046 11/09/23  2028   Color, UA Yellow Yellow Yellow   Appearance, UA Clear Clear Clear   pH, UA >8.0 A 8.0 >8.0 A   Specific [place], UA 1.005 1.010 1.015   Protein, UA 1+ A 1+ A 2+ A   Glucose, UA Negative Negative Trace A   Ketones, UA Negative Negative Negative   Urobilinogen, UA Negative Negative 2.0-3.0 A   Bilirubin (UA) Negative Negative Negative   Occult Blood UA Negative Negative Trace A   Nitrite, UA Negative Negative Negative   RBC, UA 1 0 1   WBC, UA 1 0 0   Bacteria Negative Negative Negative   Hyaline Casts, UA 3 A 1 3 A         Recent Labs   Lab 11/10/23  1518 11/10/23  1529   POC PH 7.395 7.391   POC PCO2 36.5 37.2   POC HCO3 22.4 L 22.6 L   POC PO2 54 LL 53 LL   POC SATURATED O2 88 87   POC BE -3 L -2   Sample ARTERIAL ARTERIAL         Microbiology Results (last 7 days)       Procedure Component Value Units Date/Time    Culture, Respiratory " with Gram Stain [[id number]] Collected: 11/12/23 1832    Order Status: Completed Specimen: Respiratory from Sputum Updated: 11/13/23 0757     Respiratory Culture Normal respiratory figueroa    Blood Culture #1 **CANNOT BE ORDERED STAT** [[id number]] Collected: 11/09/23 2121    Order Status: Completed Specimen: Blood Updated: 11/12/23 2232     Blood Culture, Routine No Growth to date      No Growth to date      No Growth to date      No Growth to date    Blood Culture #2 **CANNOT BE ORDERED STAT** [[id number]] Collected: 11/09/23 2201    Order Status: Completed Specimen: Blood Updated: 11/12/23 2232     Blood Culture, Routine No Growth to date      No Growth to date      No Growth to date      No Growth to date    MRSA Screen by PCR [[id number]] Collected: 11/09/23 2207    Order Status: Completed Specimen: Nasopharyngeal Swab from Nasal Updated: 11/09/23 2343     MRSA SCREEN BY PCR Negative            ASSESSMENT/PLAN:     ESRD on HD MWF via AVF  Next HD per schedule.  Patient seen on hemodialysis for uremic clearance and ultrafiltration.      Continue current dialysis prescription.  Renal diet - low K, low phos.  No IVs or BP checks on access and/or non-dominant arm.    Anemia of CKD  Hgb and HCT are acceptable. Monitor for now.  Will provide BRAYAN and/or IV iron PRN.    MBD / Secondary HPT  Ca, Phos, PTH and vitamin D levels are acceptable.   Phos binders, vitamin D and analogues, calcimimetics will be given as needed.    HTN  BP seem controlled.   Tolerate asymptomatic HTN up to -160.  Continue home meds.  Low sodium diet.    PNA  Management per primary team.    Thank you for allowing us to participate in the care of your patient!   We will follow the patient and provide recommendations as needed.    Patient care time was spent personally by me on the following activities:     Obtaining a history.  Examination of patient.  Providing medical care at the patients bedside.  Developing a treatment plan with patient  or surrogate and bedside caregivers.  Ordering and reviewing laboratory studies, radiographic studies, pulse oximetry.  Ordering and performing treatments and interventions.  Evaluation of patient's response to treatment.  Discussions with consultants while on the unit and immediately available to the patient.  Re-evaluation of the patient's condition.  Documentation in the medical record.     Jimenez Valero MD    [facility]  [address] [place] [id number]    [phone] - tel  [phone] - fax    11/13/2023

## 2023-11-13 NOTE — PT/OT/SLP PROGRESS
Physical Therapy      Patient Name:  Tyra Isaac   MRN:  [id number]    Patient not seen today secondary to Dialysis. Will follow-up .

## 2023-11-13 NOTE — PLAN OF CARE
met with Pt at bedside to discuss discharge plan. Pt states she would like to go home and have someone come in her home to help her with PT/OT. Pt would like to use [facility] referral sent via [facility].      11/13/23 9548   Post-Acute Status   Post-Acute Authorization Home Health   Home Health Status Referrals Sent   Discharge Delays None known at this time   Discharge Plan   Discharge Plan A Home Health   Discharge Plan B Home Health

## 2023-11-13 NOTE — PROGRESS NOTES
"[facility] Medicine  Progress Note    Patient Name: Tyra Isaac  MRN: [id number]  Patient Class: IP- Inpatient   Admission Date: 11/9/2023  Length of Stay: 3 days  Attending Physician: Humble Yee MD  Primary Care Provider: Kalpesh Goel MD        Subjective:     Principal Problem:Pneumonia        HPI:  Ms. Isaac is a 82 yo w/pmhx of ESRD on HD MWF, afib, HTN presenting w/nausea/vomiting. Reports vomiting x2 this AM after which her son called EMS. She also reports feeling SOB and having a non-productive cough this AM. Denies ever choking on food but does report she coughed up food this AM. Did not check her temperature this AM but reports her son thought she was feverish to EMS. Denies any new rashes. Does make urine but denies any hematuria or dysuria. Did have diarrhea today but reports it started after drinking the contrast for the CT.     In the ED, patient was febrile to 100.7. She was noted to be tachypnic to the high 30s by the ED. Ct noted multifocal PNA and bilateral hilar lymphadenopathy. She was given ctx/azithro.     Overview/Hospital Course:  11/10/2023  Ms Isaac is confused and easily distracted. She states that she has trouble breathing. She denies chest pain or numbness but is generally weak. " Otherwise I cannot say anything ".    11/11/2023  Ms Isaac arouses and speaks but will only say that she feels bad but cannot provide any more information. " I can breath now".    11/12/2023  Ms Isaac is much more awake alert and responsive.. She states that she has mild DUBOSE with movement    11/13/2023  Ms Isaac is still weak but improved respiratory status  She is considering SNF placement        Interval History: PT has recommended SNF placement and she is discussing it with her family    Review of Systems   Constitutional:  Positive for diaphoresis and fatigue.   HENT: Negative.     Eyes: Negative.    Respiratory: Negative.     Cardiovascular: Negative.  "   Gastrointestinal: Negative.    Endocrine: Positive for heat intolerance.   Genitourinary:  Positive for decreased urine volume.   Musculoskeletal:  Positive for arthralgias, gait problem and myalgias.   Skin: Negative.    Allergic/Immunologic: Positive for immunocompromised state.   Neurological:  Positive for weakness.   Hematological:  Bruises/bleeds easily.   Psychiatric/Behavioral:  The patient is nervous/anxious.      Objective:     Vital Signs (Most Recent):  Temp: 98.2 °F (36.8 °C) (11/13/23 1612)  Pulse: 73 (11/13/23 1612)  Resp: 16 (11/13/23 1612)  BP: (!) 106/56 (11/13/23 1612)  SpO2: 96 % (11/13/23 1612) Vital Signs (24h Range):  Temp:  [97.8 °F (36.6 °C)-98.6 °F (37 °C)] 98.2 °F (36.8 °C)  Pulse:  [61-87] 73  Resp:  [14-18] 16  SpO2:  [96 %-99 %] 96 %  BP: ()/(47-63) 106/56     Weight: 52.2 kg (115 lb 1.3 oz)  Body mass index is 19.15 kg/m².    Intake/Output Summary (Last 24 hours) at 11/13/2023 1741  Last data filed at 11/13/2023 1300  Gross per 24 hour   Intake 840 ml   Output 2025 ml   Net -1185 ml         Physical Exam  Vitals and nursing note reviewed.   Constitutional:       General: She is not in acute distress.     Appearance: She is not ill-appearing.   HENT:      Head: Normocephalic and atraumatic.      Nose: Nose normal.      Mouth/Throat:      Mouth: Mucous membranes are moist.   Eyes:      Extraocular Movements: Extraocular movements intact.      Pupils: Pupils are equal, round, and reactive to light.   Cardiovascular:      Rate and Rhythm: Normal rate and regular rhythm.   Pulmonary:      Effort: Pulmonary effort is normal.      Breath sounds: Normal breath sounds.   Abdominal:      General: Bowel sounds are normal.   Musculoskeletal:      Cervical back: Normal range of motion and neck supple.      Comments: 4/5 generalized muscle strength   Skin:     General: Skin is warm.   Neurological:      Mental Status: She is alert and oriented to person, place, and time.   Psychiatric:       Comments: Somewhat dulled affect             Significant Labs: All pertinent labs within the past 24 hours have been reviewed.  Recent Lab Results         11/13/23  0412   11/12/23  1832        Anion Gap 13         Baso # 0.03         Basophil % 0.5         BUN 71         Calcium 8.2         Chloride 103         CO2 22         Creatinine 10.7         Differential Method Automated         eGFR 3.3         Eos # 0.8         Eosinophil % 11.9         Glucose 70         Gram Stain (Respiratory)   >10 epithelial cells per low power field          Rare WBC's          Moderate Gram positive rods          Moderate Gram negative rods          Moderate budding yeast       Gran # (ANC) 3.7         Gran % 58.6         Hematocrit 30.7         Hemoglobin 9.9         Immature Grans (Abs) 0.02  Comment: Mild elevation in immature granulocytes is non specific and   can be seen in a variety of conditions including stress response,   acute inflammation, trauma and pregnancy. Correlation with other   laboratory and clinical findings is essential.           Immature Granulocytes 0.3         Lymph # 0.9         Lymph % 14.1         MCH 31.2         MCHC 32.2         MCV 97         Mono # 0.9         Mono % 14.6         MPV 10.8         nRBC 0         Platelet Count 201         Potassium 4.5         RBC 3.17         RDW 15.3         Respiratory Culture   Normal respiratory figueroa  [P]       Sodium 138         WBC 6.31                  [P] - Preliminary Result               Significant Imaging: I have reviewed all pertinent imaging results/findings within the past 24 hours.    Assessment/Plan:      * Pneumonia  CT w/bilateral PNA   -speech consult to evaluate for aspiration PNA  -s/p azithro/ctx in ED   -hold further azithro for now 2/2 prolonged QTc  -cont ctx   -add vanc (ESRD - MRSA risk factor)  -MRSA nares   -procal in AM   -blood cultures   -UA to complete infectious workup   -PT/OT; pt lives alone       Nausea  -scopolamine patch    -avoid QTc prolonging agents   -recheck EKG in AM       Anemia of chronic disease  Stable   ctm     ESRD on HD via are right upper extremity AVF MWF  HD MVF  -consult nephrology- follows w/Dr. Maynard       Paroxysmal atrial fibrillation  -check INR (pt unsure if she takes eliquis or warfarin)  -continue eliquis for now   -sotalol   -re-check EKG in AM (QtC >500 and may need to stop sotalol)  -diltizem   -also had metoprolol listed on MAR but upon pharmacy tech review, last filled in July 2023 - hold for now         VTE Risk Mitigation (From admission, onward)           Ordered     apixaban tablet 2.5 mg  2 times daily         11/09/23 2139     IP VTE HIGH RISK PATIENT  Once         11/09/23 2139     Place sequential compression device  Until discontinued         11/09/23 2139                    Discharge Planning   ILAN: 11/13/2023     Code Status: Full Code   Is the patient medically ready for discharge?:     Reason for patient still in hospital (select all that apply): Treatment and Pending disposition  Discharge Plan A: Home Health   Discharge Delays: None known at this time              Humble Yee MD  Department of Hospital Medicine   [facility]

## 2023-11-13 NOTE — PT/OT/SLP PROGRESS
Occupational Therapy      Patient Name:  Tyra Isaac   MRN:  [id number]    Patient not seen today secondary to Dialysis. Will follow-up next scheduled service date.    11/13/2023

## 2023-11-13 NOTE — CARE UPDATE
11/12/23 1942   Patient Assessment/Suction   Level of Consciousness (AVPU) alert   Respiratory Effort Normal;Unlabored   Expansion/Accessory Muscles/Retractions no retractions;no use of accessory muscles   All Lung Fields Breath Sounds equal bilaterally;clear   Cough Frequency infrequent   Cough Type dry;good;nonproductive   PRE-TX-O2   Device (Oxygen Therapy) room air   SpO2 97 %   Pulse Oximetry Type Intermittent   $ Pulse Oximetry - Single Charge Pulse Oximetry - Single   Aerosol Therapy   $ Aerosol Therapy Charges Aerosol Treatment   Daily Review of Necessity (SVN) completed   Respiratory Treatment Status (SVN) given   Treatment Route (SVN) mask;oxygen   Patient Position (SVN) HOB elevated   Post Treatment Assessment (SVN) breath sounds improved   Signs of Intolerance (SVN) none   Aerosol Therapy (SVN) Infant   Patient Position (SVN) HOB elevated   Post Treatment Assessment (SVN) increased aeration   Signs of Intolerance (SVN) none   Education   $ Education Bronchodilator;15 min

## 2023-11-13 NOTE — CARE UPDATE
11/13/23 0743   Patient Assessment/Suction   Level of Consciousness (AVPU) alert   Respiratory Effort Normal;Unlabored   Expansion/Accessory Muscles/Retractions no use of accessory muscles;no retractions   All Lung Fields Breath Sounds clear   Rhythm/Pattern, Respiratory unlabored;pattern regular;depth regular;no shortness of breath reported   Cough Frequency no cough   PRE-TX-O2   Device (Oxygen Therapy) room air   SpO2 96 %   Pulse Oximetry Type Intermittent   $ Pulse Oximetry - Multiple Charge Pulse Oximetry - Multiple   Pulse 65   Resp 16   Aerosol Therapy   $ Aerosol Therapy Charges Aerosol Treatment   Daily Review of Necessity (SVN) completed   Respiratory Treatment Status (SVN) given   Treatment Route (SVN) mask;oxygen   Patient Position (SVN) HOB elevated   Post Treatment Assessment (SVN) breath sounds unchanged   Signs of Intolerance (SVN) none   Breath Sounds Post-Respiratory Treatment   Throughout All Fields Post-Treatment All Fields   Throughout All Fields Post-Treatment no change   Post-treatment Heart Rate (beats/min) 68   Post-treatment Resp Rate (breaths/min) 17

## 2023-11-13 NOTE — PROGRESS NOTES
11/13/23 1210   Vital Signs   Temp 97.8 °F (36.6 °C)   Pulse 84   Resp 18   SpO2 98 %   BP (!) 95/57   Post-Hemodialysis Assessment   Rinseback Volume (mL) 300 mL   Blood Volume Processed (Liters) 53.9 L   Dialyzer Clearance Lightly streaked   Duration of Treatment 165 minutes   Additional Fluid Intake (mL) 500 mL   Total UF (mL) 2025 mL   Net Fluid Removal 1225   Patient Response to Treatment Unable to pull UF goal   Post-Treatment Weight 48 kg (105 lb 13.1 oz)   Treatment Weight Change -1.03   Arterial bleeding stop time (min) 5 min   Venous bleeding stop time (min) 5 min   Post-Hemodialysis Comments Report given to floor RN

## 2023-11-13 NOTE — SUBJECTIVE & OBJECTIVE
Interval History: PT has recommended SNF placement and she is discussing it with her family    Review of Systems   Constitutional:  Positive for diaphoresis and fatigue.   HENT: Negative.     Eyes: Negative.    Respiratory: Negative.     Cardiovascular: Negative.    Gastrointestinal: Negative.    Endocrine: Positive for heat intolerance.   Genitourinary:  Positive for decreased urine volume.   Musculoskeletal:  Positive for arthralgias, gait problem and myalgias.   Skin: Negative.    Allergic/Immunologic: Positive for immunocompromised state.   Neurological:  Positive for weakness.   Hematological:  Bruises/bleeds easily.   Psychiatric/Behavioral:  The patient is nervous/anxious.      Objective:     Vital Signs (Most Recent):  Temp: 98.2 °F (36.8 °C) (11/13/23 1612)  Pulse: 73 (11/13/23 1612)  Resp: 16 (11/13/23 1612)  BP: (!) 106/56 (11/13/23 1612)  SpO2: 96 % (11/13/23 1612) Vital Signs (24h Range):  Temp:  [97.8 °F (36.6 °C)-98.6 °F (37 °C)] 98.2 °F (36.8 °C)  Pulse:  [61-87] 73  Resp:  [14-18] 16  SpO2:  [96 %-99 %] 96 %  BP: ()/(47-63) 106/56     Weight: 52.2 kg (115 lb 1.3 oz)  Body mass index is 19.15 kg/m².    Intake/Output Summary (Last 24 hours) at 11/13/2023 1741  Last data filed at 11/13/2023 1300  Gross per 24 hour   Intake 840 ml   Output 2025 ml   Net -1185 ml         Physical Exam  Vitals and nursing note reviewed.   Constitutional:       General: She is not in acute distress.     Appearance: She is not ill-appearing.   HENT:      Head: Normocephalic and atraumatic.      Nose: Nose normal.      Mouth/Throat:      Mouth: Mucous membranes are moist.   Eyes:      Extraocular Movements: Extraocular movements intact.      Pupils: Pupils are equal, round, and reactive to light.   Cardiovascular:      Rate and Rhythm: Normal rate and regular rhythm.   Pulmonary:      Effort: Pulmonary effort is normal.      Breath sounds: Normal breath sounds.   Abdominal:      General: Bowel sounds are normal.    Musculoskeletal:      Cervical back: Normal range of motion and neck supple.      Comments: 4/5 generalized muscle strength   Skin:     General: Skin is warm.   Neurological:      Mental Status: She is alert and oriented to person, place, and time.   Psychiatric:      Comments: Somewhat dulled affect             Significant Labs: All pertinent labs within the past 24 hours have been reviewed.  Recent Lab Results         11/13/23  0412   11/12/23  1832        Anion Gap 13         Baso # 0.03         Basophil % 0.5         BUN 71         Calcium 8.2         Chloride 103         CO2 22         Creatinine 10.7         Differential Method Automated         eGFR 3.3         Eos # 0.8         Eosinophil % 11.9         Glucose 70         Gram Stain (Respiratory)   >10 epithelial cells per low power field          Rare WBC's          Moderate Gram positive rods          Moderate Gram negative rods          Moderate budding yeast       Gran # (ANC) 3.7         Gran % 58.6         Hematocrit 30.7         Hemoglobin 9.9         Immature Grans (Abs) 0.02  Comment: Mild elevation in immature granulocytes is non specific and   can be seen in a variety of conditions including stress response,   acute inflammation, trauma and pregnancy. Correlation with other   laboratory and clinical findings is essential.           Immature Granulocytes 0.3         Lymph # 0.9         Lymph % 14.1         MCH 31.2         MCHC 32.2         MCV 97         Mono # 0.9         Mono % 14.6         MPV 10.8         nRBC 0         Platelet Count 201         Potassium 4.5         RBC 3.17         RDW 15.3         Respiratory Culture   Normal respiratory figueroa  [P]       Sodium 138         WBC 6.31                  [P] - Preliminary Result               Significant Imaging: I have reviewed all pertinent imaging results/findings within the past 24 hours.

## 2023-11-13 NOTE — PT/OT/SLP PROGRESS
Speech Language Pathology      Terramaty Isaac  MRN: [id number]    Patient not seen today secondary to dialysis. Will follow-up 11/14.

## 2023-11-14 VITALS
RESPIRATION RATE: 22 BRPM | SYSTOLIC BLOOD PRESSURE: 121 MMHG | WEIGHT: 115.06 LBS | OXYGEN SATURATION: 98 % | TEMPERATURE: 98 F | HEIGHT: 65 IN | DIASTOLIC BLOOD PRESSURE: 60 MMHG | BODY MASS INDEX: 19.17 KG/M2 | HEART RATE: 68 BPM

## 2023-11-14 LAB
ANION GAP SERPL CALC-SCNC: 11 MMOL/L (ref 8–16)
BACTERIA BLD CULT: NORMAL
BACTERIA BLD CULT: NORMAL
BACTERIA SPEC AEROBE CULT: NORMAL
BASOPHILS # BLD AUTO: 0.05 K/UL (ref 0–0.2)
BASOPHILS NFR BLD: 0.9 % (ref 0–1.9)
BUN SERPL-MCNC: 50 MG/DL (ref 8–23)
CALCIUM SERPL-MCNC: 9.5 MG/DL (ref 8.7–10.5)
CHLORIDE SERPL-SCNC: 105 MMOL/L (ref 95–110)
CO2 SERPL-SCNC: 22 MMOL/L (ref 23–29)
CREAT SERPL-MCNC: 8.2 MG/DL (ref 0.5–1.4)
DIFFERENTIAL METHOD: ABNORMAL
EOSINOPHIL # BLD AUTO: 0.7 K/UL (ref 0–0.5)
EOSINOPHIL NFR BLD: 12.8 % (ref 0–8)
ERYTHROCYTE [DISTWIDTH] IN BLOOD BY AUTOMATED COUNT: 15.4 % (ref 11.5–14.5)
EST. GFR  (NO RACE VARIABLE): 4.5 ML/MIN/1.73 M^2
GLUCOSE SERPL-MCNC: 75 MG/DL (ref 70–110)
GRAM STN SPEC: NORMAL
HBV CORE AB SERPL QL IA: NEGATIVE
HBV SURFACE AB SER QL: REACTIVE
HBV SURFACE AG SERPL QL IA: NEGATIVE
HCT VFR BLD AUTO: 34.1 % (ref 37–48.5)
HGB BLD-MCNC: 10.9 G/DL (ref 12–16)
IMM GRANULOCYTES # BLD AUTO: 0.03 K/UL (ref 0–0.04)
IMM GRANULOCYTES NFR BLD AUTO: 0.5 % (ref 0–0.5)
LYMPHOCYTES # BLD AUTO: 1 K/UL (ref 1–4.8)
LYMPHOCYTES NFR BLD: 17.9 % (ref 18–48)
MCH RBC QN AUTO: 31.3 PG (ref 27–31)
MCHC RBC AUTO-ENTMCNC: 32 G/DL (ref 32–36)
MCV RBC AUTO: 98 FL (ref 82–98)
MONOCYTES # BLD AUTO: 0.9 K/UL (ref 0.3–1)
MONOCYTES NFR BLD: 15.6 % (ref 4–15)
NEUTROPHILS # BLD AUTO: 3 K/UL (ref 1.8–7.7)
NEUTROPHILS NFR BLD: 52.3 % (ref 38–73)
NRBC BLD-RTO: 0 /100 WBC
PLATELET # BLD AUTO: 240 K/UL (ref 150–450)
PMV BLD AUTO: 10.3 FL (ref 9.2–12.9)
POTASSIUM SERPL-SCNC: 4.8 MMOL/L (ref 3.5–5.1)
RBC # BLD AUTO: 3.48 M/UL (ref 4–5.4)
SODIUM SERPL-SCNC: 138 MMOL/L (ref 136–145)
WBC # BLD AUTO: 5.77 K/UL (ref 3.9–12.7)

## 2023-11-14 PROCEDURE — 36415 COLL VENOUS BLD VENIPUNCTURE: CPT | Performed by: INTERNAL MEDICINE

## 2023-11-14 PROCEDURE — 25000003 PHARM REV CODE 250: Performed by: INTERNAL MEDICINE

## 2023-11-14 PROCEDURE — 80048 BASIC METABOLIC PNL TOTAL CA: CPT | Performed by: INTERNAL MEDICINE

## 2023-11-14 PROCEDURE — 25000242 PHARM REV CODE 250 ALT 637 W/ HCPCS: Performed by: INTERNAL MEDICINE

## 2023-11-14 PROCEDURE — 99900031 HC PATIENT EDUCATION (STAT)

## 2023-11-14 PROCEDURE — 99900035 HC TECH TIME PER 15 MIN (STAT)

## 2023-11-14 PROCEDURE — 85025 COMPLETE CBC W/AUTO DIFF WBC: CPT | Performed by: INTERNAL MEDICINE

## 2023-11-14 PROCEDURE — 94761 N-INVAS EAR/PLS OXIMETRY MLT: CPT

## 2023-11-14 PROCEDURE — 94640 AIRWAY INHALATION TREATMENT: CPT

## 2023-11-14 RX ORDER — DOXYCYCLINE 100 MG/1
100 CAPSULE ORAL EVERY 12 HOURS
Qty: 14 CAPSULE | Refills: 0 | Status: SHIPPED | OUTPATIENT
Start: 2023-11-14 | End: 2023-11-21

## 2023-11-14 RX ADMIN — IPRATROPIUM BROMIDE AND ALBUTEROL SULFATE 3 ML: 2.5; .5 SOLUTION RESPIRATORY (INHALATION) at 07:11

## 2023-11-14 RX ADMIN — DILTIAZEM HYDROCHLORIDE 120 MG: 120 CAPSULE, COATED, EXTENDED RELEASE ORAL at 09:11

## 2023-11-14 RX ADMIN — CALCIUM ACETATE 667 MG: 667 CAPSULE ORAL at 09:11

## 2023-11-14 RX ADMIN — ATORVASTATIN CALCIUM 40 MG: 40 TABLET, FILM COATED ORAL at 09:11

## 2023-11-14 RX ADMIN — APIXABAN 2.5 MG: 2.5 TABLET, FILM COATED ORAL at 09:11

## 2023-11-14 RX ADMIN — DOXYCYCLINE HYCLATE 100 MG: 100 CAPSULE ORAL at 09:11

## 2023-11-14 RX ADMIN — IPRATROPIUM BROMIDE AND ALBUTEROL SULFATE 3 ML: 2.5; .5 SOLUTION RESPIRATORY (INHALATION) at 01:11

## 2023-11-14 RX ADMIN — SOTALOL HYDROCHLORIDE 80 MG: 80 TABLET ORAL at 09:11

## 2023-11-14 RX ADMIN — MUPIROCIN 1 G: 20 OINTMENT TOPICAL at 09:11

## 2023-11-14 RX ADMIN — DORZOLAMIDE HYDROCHLORIDE AND TIMOLOL MALEATE 1 DROP: 22.3; 6.8 SOLUTION/ DROPS OPHTHALMIC at 09:11

## 2023-11-14 NOTE — PLAN OF CARE
[facility]  Initial Discharge Assessment       Primary Care Provider: Kalpesh Goel MD    Admission Diagnosis: Vomiting [R11.10]    Admission Date: 11/9/2023  Expected Discharge Date: 11/13/2023    Transition of Care Barriers: None    Payor: HU[employer] MANAGED MEDICARE / Plan: [employer] MEDICARE HMO / Product Type: Capitation /     Extended Emergency Contact Information  Primary Emergency Contact: Pantera Isaacvin  Address: [address]           [place] [address] [facility]  Home Phone: [phone]  Mobile Phone: [phone]  Relation: Son   needed? No    Discharge Plan A: (P) Home Health  Discharge Plan B: (P) Home Health      [id number] - [place], [place] - [address].  [address] [place] [address]  Phone: [phone] Fax: [phone]      Initial Assessment (most recent)       Adult Discharge Assessment - 11/10/23 1522          Discharge Assessment    Assessment Type Discharge Planning Assessment     Confirmed/corrected address, phone number and insurance Yes     Confirmed Demographics Correct on Facesheet     Source of Information family     Does patient/caregiver understand observation status Yes     Communicated ILAN with patient/caregiver No     Reason For Admission pneumonia     People in Home alone     Facility Arrived From: home     Do you expect to return to your current living situation? Yes     Do you have help at home or someone to help you manage your care at home? Yes     Who are your caregiver(s) and their phone number(s)? Raffi Isaac (Son)   [phone] (Mobile)     Current cognitive status: Unable to Assess     Walking or Climbing Stairs ambulation difficulty, requires equipment     Equipment Currently Used at Home rollator     Readmission within 30 days? No     Patient currently being followed by outpatient case management? No     Do you currently have service(s) that help you manage your care at home? Yes     Name and  Contact number of agency unknown home health agency - son to find out name and let us know     Is the pt/caregiver preference to resume services with current agency Yes     Do you have prescription coverage? Yes     Coverage [employer]     Who is going to help you get home at discharge? Raffi Isaac (Son)   [phone] (Mobile)     How do you get to doctors appointments? family or friend will provide     Are you on dialysis? Yes     Dialysis Name and Scheduled days [facility] [address], MWF schedule     Do you take coumadin? No     DME Needed Upon Discharge  none     Discharge Plan discussed with: Adult children     Transition of Care Barriers None     Discharge Plan A Home Health     Discharge Plan B Home Health

## 2023-11-14 NOTE — DISCHARGE SUMMARY
"[facility]  Discharge Summary      Patient Name: Tyra Isaac  MRN: [id number]  ROMMEL: [id number]  Patient Class: IP- Inpatient  Admission Date: 11/9/2023  Hospital Length of Stay: 4 days  Discharge Date and Time:  11/14/2023 3:09 PM  Attending Physician: No att. providers found   Discharging Provider: Humble Yee MD  Primary Care Provider: Kalpesh Goel MD    Primary Care Team: Networked reference to record PCT     HPI:   Ms. Isaac is a 84 yo w/pmhx of ESRD on HD MWF, afib, HTN presenting w/nausea/vomiting. Reports vomiting x2 this AM after which her son called EMS. She also reports feeling SOB and having a non-productive cough this AM. Denies ever choking on food but does report she coughed up food this AM. Did not check her temperature this AM but reports her son thought she was feverish to EMS. Denies any new rashes. Does make urine but denies any hematuria or dysuria. Did have diarrhea today but reports it started after drinking the contrast for the CT.     In the ED, patient was febrile to 100.7. She was noted to be tachypnic to the high 30s by the ED. Ct noted multifocal PNA and bilateral hilar lymphadenopathy. She was given ctx/azithro.     * No surgery found *      Hospital Course:   11/10/2023  Ms Isaac is confused and easily distracted. She states that she has trouble breathing. She denies chest pain or numbness but is generally weak. " Otherwise I cannot say anything ".    11/11/2023  Ms Isaac arouses and speaks but will only say that she feels bad but cannot provide any more information. " I can breath now".    11/12/2023  Ms Isaac is much more awake alert and responsive.. She states that she has mild DUBOSE with movement    11/13/2023  Ms Isaac is still weak but improved respiratory status  She is considering SNF placement    11/14/2023  Ms Isaac is feeling well with no SOB and mild DUBOSE. She ia AA O X 3 and refuses SNF referral as recommended by " PT.   ROS: see above otherwise negative X 8  PE: in no distress HEENT MURRAY EOM intact moist mucus membranes Neck supple no use of accessory muscles Lungs no crackles wheezes or rhonchi Heart S 1 S 2 RRR no murmur Abdomen BS+ nontender Ext: without CC or E pulses 1-2+ Skin no acute finding Neuro no acute sensory or motor deficit     Goals of Care Treatment Preferences:  Code Status: Full Code      Consults:   Consults (From admission, onward)          Status Ordering Provider     Inpatient consult to Social Work/Case Management  Once        Provider:  (Not yet assigned)    Completed ZOHAIB ISSA     Inpatient consult to Nephrology  Once        Provider:  Raleigh Yee MD    Completed ZOHAIB ISSA            No new Assessment & Plan notes have been filed under this hospital service since the last note was generated.  Service: Hospital Medicine    Final Active Diagnoses:    Diagnosis Date Noted POA    PRINCIPAL PROBLEM:  Pneumonia [J18.9] 11/09/2023 Yes    Acute respiratory failure with hypoxia [J96.01] 11/10/2023 Yes    Nausea [R11.0] 11/09/2023 Yes    Anemia of chronic disease [D63.8] 01/27/2022 Yes     Chronic    ESRD on HD via are right upper extremity AVF MWF [N18.6] 12/13/2019 Yes     Chronic    Paroxysmal atrial fibrillation [I48.0] 03/28/2018 Yes      Problems Resolved During this Admission:       Discharged Condition: good    Disposition: Home-Health Care Svc    Follow Up:   Follow-up Information       Kalpesh Goel MD Follow up in 1 week(s).    Specialty: Internal Medicine  Contact information:  [address] [facility]  [address]  [facility] [address]  [phone]               Raleigh Yee MD Follow up.    Specialty: Nephrology  Why: Routine dialysis MWF  Contact information:  [address] [facility] NEPHROLOGY [facility] [address]  [phone]                           Patient Instructions:      Basic metabolic panel   Standing Status: Future Standing Exp. Date: 01/12/25     Ambulatory  "referral/consult to Home Health   Referral Priority: Routine Referral Type: Home Health   Referral Reason: Specialty Services Required   Requested Specialty: Home Health Services   Number of Visits Requested: 1     Diet Cardiac     Diet renal     Activity as tolerated       Significant Diagnostic Studies: Labs: BMP:   Recent Labs   Lab 11/13/23 0412 11/14/23 0403   GLU 70 75    138   K 4.5 4.8    105   CO2 22* 22*   BUN 71* 50*   CREATININE 10.7* 8.2*   CALCIUM 8.2* 9.5   , CMP   Recent Labs   Lab 11/13/23 0412 11/14/23 0403    138   K 4.5 4.8    105   CO2 22* 22*   GLU 70 75   BUN 71* 50*   CREATININE 10.7* 8.2*   CALCIUM 8.2* 9.5   ANIONGAP 13 11   , CBC   Recent Labs   Lab 11/13/23 0412 11/14/23 0403   WBC 6.31 5.77   HGB 9.9* 10.9*   HCT 30.7* 34.1*    240   , INR   Lab Results   Component Value Date    INR 1.1 11/09/2023    INR 1.5 (H) 07/21/2023    INR 1.6 (H) 07/20/2023   , Troponin No results for input(s): "TROPONINI" in the last 168 hours., and All labs within the past 24 hours have been reviewed  Microbiology: Blood Culture   Lab Results   Component Value Date    LABBLOO No Growth to date 11/09/2023    LABBLOO No Growth to date 11/09/2023    LABBLOO No Growth to date 11/09/2023    LABBLOO No Growth to date 11/09/2023    LABBLOO No Growth to date 11/09/2023    and Urine Culture  No results found for: "LABURIN"    Pending Diagnostic Studies:       None           Medications:  Reconciled Home Medications:      Medication List        START taking these medications      doxycycline 100 MG Cap  Commonly known as: VIBRAMYCIN  Take 1 capsule (100 mg total) by mouth every 12 (twelve) hours. for 7 days            CONTINUE taking these medications      atorvastatin 40 MG tablet  Commonly known as: LIPITOR  Take 40 mg by mouth once daily.     b complex vitamins tablet  Take 1 tablet by mouth once daily.     calcium acetate(phosphat bind) 667 mg tablet  Commonly known as: " PHOSLO  Take 667 mg by mouth 2 (two) times daily with meals.     diltiaZEM 120 MG Cp24  Commonly known as: CARDIZEM CD  Take 1 capsule (120 mg total) by mouth 2 (two) times a day.     docusate sodium 100 MG capsule  Commonly known as: COLACE  Take 100 mg by mouth once daily.     dorzolamide-timolol 2-0.5% 22.3-6.8 mg/mL ophthalmic solution  Commonly known as: COSOPT  Place 1 drop into both eyes 2 (two) times daily.     ELIQUIS 2.5 mg Tab  Generic drug: apixaban  Take 2.5 mg by mouth 2 (two) times daily.     latanoprost 0.005 % ophthalmic solution  Place 1 drop into both eyes every evening.     ondansetron 4 MG tablet  Commonly known as: ZOFRAN  Take 1 tablet (4 mg total) by mouth every 8 (eight) hours as needed for Nausea.     sotaloL 80 MG tablet  Commonly known as: BETAPACE  Take 80 mg by mouth 2 (two) times daily. Take 1/2 tablet (40mg) BID            STOP taking these medications      metoprolol tartrate 25 MG tablet  Commonly known as: LOPRESSOR              Indwelling Lines/Drains at time of discharge:   Lines/Drains/Airways       Drain  Duration                  Hemodialysis AV Fistula Right upper arm -- days                    Time spent on the discharge of patient: 38 minutes         Humble Yee MD  Department of Hospital Medicine  [facility]

## 2023-11-14 NOTE — PLAN OF CARE
Patient to transfer to home health service with [facility], verified SOC date is 11/15/2023 per Gaye at . Patient will transport home via private vehicle with son Raffi Isaac (Son)   [phone] (Mobile), verified on phone with son.  Patient will resume HD at [facility] on MWF schedule.  Discharge orders and chart reviewed with no further post-acute discharge needs identified at this time.  At this time, patient is cleared for discharge from Case Management standpoint.        11/14/23 0944   Final Note   Assessment Type Final Discharge Note   Anticipated Discharge Disposition [facility]   Hospital Resources/Appts/Education Provided   (Patient to coordinate with home health for scheduling post hospital follow up appointment with their PCP.)   Post-Acute Status   Post-Acute Authorization [facility]   Home Health Status Set-up Complete/Auth obtained   Coverage [employer]   Discharge Delays None known at this time

## 2023-11-15 ENCOUNTER — PATIENT OUTREACH (OUTPATIENT)
Dept: FAMILY MEDICINE | Facility: CLINIC | Age: 83
End: 2023-11-15

## 2023-11-15 ENCOUNTER — PATIENT OUTREACH (OUTPATIENT)
Dept: ADMINISTRATIVE | Facility: CLINIC | Age: 83
End: 2023-11-15
Payer: MEDICARE

## 2023-11-15 LAB
L PNEUMO1 AG UR QL IA: NORMAL
LEGIONELLA SPECIMEN SOURCE: NORMAL

## 2023-11-15 NOTE — PROGRESS NOTES
C3 nurse attempted to contact Tyra Isaac for a TCC post hospital discharge follow up call. The patient is unable to conduct the call @ this time. The patient requested a callback.    The patient does not have a scheduled HOSFU appointment within 5-7 days post hospital discharge date 11/14/23. Message sent to Physician staff to assist with HOSFU appointment scheduling.

## 2023-11-15 NOTE — TELEPHONE ENCOUNTER
Discharge Information     Discharge Date:   11/14/23    Primary Discharge Diagnosis:  Pneumonia      Discharge Summary:  Reviewed      Medication & Order Review     Were medication changes made or new medications added?   Yes     If so, has the patient filled the prescriptions?  Yes     Was Home Health ordered? Yes    If so, has Home Health contacted patient and/or initiated services?  Yes    Name of Home Health Agency? N/A    Durable Medical Equipment ordered?  No     If so, has the DME provider contacted patient and delivered equipment?  N/A    Follow Up               Any problems since discharge? No    How is the patient feeling since returning home?  Ok     Have you set up recommended follow up appointments?  (cardiology, surgery, etc.)    Schedule Hospital Follow-up appointment within 7-14 days (preferably 7).      Notes:  pt feeling ok and will keep appt.             Jeffery Barbosa

## 2023-11-15 NOTE — PROGRESS NOTES
C3 nurse spoke with Tyra Isaac for a TCC post hospital discharge follow up call. The patient has a scheduled [facility] appointment with Kalpesh Goel MD   on 11/27/23 @ 1020.

## 2023-11-29 ENCOUNTER — PATIENT MESSAGE (OUTPATIENT)
Dept: FAMILY MEDICINE | Facility: CLINIC | Age: 83
End: 2023-11-29

## 2023-11-29 ENCOUNTER — OFFICE VISIT (OUTPATIENT)
Dept: FAMILY MEDICINE | Facility: CLINIC | Age: 83
End: 2023-11-29
Payer: MEDICARE

## 2023-11-29 VITALS
BODY MASS INDEX: 17.83 KG/M2 | SYSTOLIC BLOOD PRESSURE: 130 MMHG | DIASTOLIC BLOOD PRESSURE: 54 MMHG | HEART RATE: 50 BPM | WEIGHT: 107 LBS | HEIGHT: 65 IN

## 2023-11-29 DIAGNOSIS — G31.84 MILD COGNITIVE IMPAIRMENT: ICD-10-CM

## 2023-11-29 DIAGNOSIS — N18.6 STAGE 5 CHRONIC KIDNEY DISEASE ON CHRONIC DIALYSIS: ICD-10-CM

## 2023-11-29 DIAGNOSIS — N18.6 BENIGN HYPERTENSION WITH ESRD (END-STAGE RENAL DISEASE): ICD-10-CM

## 2023-11-29 DIAGNOSIS — E78.2 MULTIPLE-TYPE HYPERLIPIDEMIA: ICD-10-CM

## 2023-11-29 DIAGNOSIS — Z12.31 ENCOUNTER FOR SCREENING MAMMOGRAM FOR BREAST CANCER: ICD-10-CM

## 2023-11-29 DIAGNOSIS — Z79.01 CHRONIC ANTICOAGULATION: ICD-10-CM

## 2023-11-29 DIAGNOSIS — Z99.2 STAGE 5 CHRONIC KIDNEY DISEASE ON CHRONIC DIALYSIS: ICD-10-CM

## 2023-11-29 DIAGNOSIS — I25.118 CORONARY ARTERY DISEASE OF NATIVE HEART WITH STABLE ANGINA PECTORIS, UNSPECIFIED VESSEL OR LESION TYPE: ICD-10-CM

## 2023-11-29 DIAGNOSIS — I12.0 BENIGN HYPERTENSION WITH ESRD (END-STAGE RENAL DISEASE): ICD-10-CM

## 2023-11-29 DIAGNOSIS — J18.9 PNEUMONIA OF BOTH LOWER LOBES DUE TO INFECTIOUS ORGANISM: Primary | ICD-10-CM

## 2023-11-29 DIAGNOSIS — I48.0 PAROXYSMAL ATRIAL FIBRILLATION: ICD-10-CM

## 2023-11-29 PROCEDURE — 99214 OFFICE O/P EST MOD 30 MIN: CPT | Mod: S$GLB,,, | Performed by: INTERNAL MEDICINE

## 2023-11-29 PROCEDURE — 99214 PR OFFICE/OUTPT VISIT, EST, LEVL IV, 30-39 MIN: ICD-10-PCS | Mod: S$GLB,,, | Performed by: INTERNAL MEDICINE

## 2023-11-29 PROCEDURE — 1111F PR DISCHARGE MEDS RECONCILED W/ CURRENT OUTPATIENT MED LIST: ICD-10-PCS | Mod: CPTII,S$GLB,, | Performed by: INTERNAL MEDICINE

## 2023-11-29 PROCEDURE — 1100F PTFALLS ASSESS-DOCD GE2>/YR: CPT | Mod: CPTII,S$GLB,, | Performed by: INTERNAL MEDICINE

## 2023-11-29 PROCEDURE — 1160F RVW MEDS BY RX/DR IN RCRD: CPT | Mod: CPTII,S$GLB,, | Performed by: INTERNAL MEDICINE

## 2023-11-29 PROCEDURE — 3288F PR FALLS RISK ASSESSMENT DOCUMENTED: ICD-10-PCS | Mod: CPTII,S$GLB,, | Performed by: INTERNAL MEDICINE

## 2023-11-29 PROCEDURE — 1126F AMNT PAIN NOTED NONE PRSNT: CPT | Mod: CPTII,S$GLB,, | Performed by: INTERNAL MEDICINE

## 2023-11-29 PROCEDURE — 3078F PR MOST RECENT DIASTOLIC BLOOD PRESSURE < 80 MM HG: ICD-10-PCS | Mod: CPTII,S$GLB,, | Performed by: INTERNAL MEDICINE

## 2023-11-29 PROCEDURE — 1159F MED LIST DOCD IN RCRD: CPT | Mod: CPTII,S$GLB,, | Performed by: INTERNAL MEDICINE

## 2023-11-29 PROCEDURE — 3075F PR MOST RECENT SYSTOLIC BLOOD PRESS GE 130-139MM HG: ICD-10-PCS | Mod: CPTII,S$GLB,, | Performed by: INTERNAL MEDICINE

## 2023-11-29 PROCEDURE — 3075F SYST BP GE 130 - 139MM HG: CPT | Mod: CPTII,S$GLB,, | Performed by: INTERNAL MEDICINE

## 2023-11-29 PROCEDURE — 3078F DIAST BP <80 MM HG: CPT | Mod: CPTII,S$GLB,, | Performed by: INTERNAL MEDICINE

## 2023-11-29 PROCEDURE — 1100F PR PT FALLS ASSESS DOC 2+ FALLS/FALL W/INJURY/YR: ICD-10-PCS | Mod: CPTII,S$GLB,, | Performed by: INTERNAL MEDICINE

## 2023-11-29 PROCEDURE — 3288F FALL RISK ASSESSMENT DOCD: CPT | Mod: CPTII,S$GLB,, | Performed by: INTERNAL MEDICINE

## 2023-11-29 PROCEDURE — 1160F PR REVIEW ALL MEDS BY PRESCRIBER/CLIN PHARMACIST DOCUMENTED: ICD-10-PCS | Mod: CPTII,S$GLB,, | Performed by: INTERNAL MEDICINE

## 2023-11-29 PROCEDURE — 1111F DSCHRG MED/CURRENT MED MERGE: CPT | Mod: CPTII,S$GLB,, | Performed by: INTERNAL MEDICINE

## 2023-11-29 PROCEDURE — 1159F PR MEDICATION LIST DOCUMENTED IN MEDICAL RECORD: ICD-10-PCS | Mod: CPTII,S$GLB,, | Performed by: INTERNAL MEDICINE

## 2023-11-29 PROCEDURE — 1126F PR PAIN SEVERITY QUANTIFIED, NO PAIN PRESENT: ICD-10-PCS | Mod: CPTII,S$GLB,, | Performed by: INTERNAL MEDICINE

## 2023-11-29 RX ORDER — LOPERAMIDE HCL 2 MG
2 TABLET ORAL 4 TIMES DAILY PRN
Status: ON HOLD | COMMUNITY
End: 2023-12-15
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
## 2023-11-30 ENCOUNTER — PATIENT MESSAGE (OUTPATIENT)
Dept: FAMILY MEDICINE | Facility: CLINIC | Age: 83
End: 2023-11-30

## 2023-11-30 RX ORDER — SOTALOL HYDROCHLORIDE 80 MG/1
80 TABLET ORAL 2 TIMES DAILY
Qty: 60 TABLET | Refills: 0 | Status: ON HOLD | OUTPATIENT
Start: 2023-11-30 | End: 2023-12-15 | Stop reason: HOSPADM

## 2023-12-04 ENCOUNTER — HOSPITAL ENCOUNTER (INPATIENT)
Facility: HOSPITAL | Age: 83
LOS: 10 days | Discharge: NURSING, REHAB OR LONG-TERM CARE | DRG: 177 | End: 2023-12-15
Attending: EMERGENCY MEDICINE | Admitting: STUDENT IN AN ORGANIZED HEALTH CARE EDUCATION/TRAINING PROGRAM
Payer: MEDICARE

## 2023-12-04 DIAGNOSIS — N18.6 ESRD (END STAGE RENAL DISEASE): Chronic | ICD-10-CM

## 2023-12-04 DIAGNOSIS — U07.1 COVID-19 VIRUS INFECTION: ICD-10-CM

## 2023-12-04 DIAGNOSIS — R00.1 SINUS BRADYCARDIA SEEN ON CARDIAC MONITOR: ICD-10-CM

## 2023-12-04 DIAGNOSIS — R53.1 GENERALIZED WEAKNESS: ICD-10-CM

## 2023-12-04 DIAGNOSIS — N18.6 END STAGE RENAL DISEASE: ICD-10-CM

## 2023-12-04 DIAGNOSIS — R07.9 CHEST PAIN: ICD-10-CM

## 2023-12-04 DIAGNOSIS — I48.0 AF (PAROXYSMAL ATRIAL FIBRILLATION): ICD-10-CM

## 2023-12-04 DIAGNOSIS — I47.10 SVT (SUPRAVENTRICULAR TACHYCARDIA): ICD-10-CM

## 2023-12-04 DIAGNOSIS — U07.1 COVID-19: ICD-10-CM

## 2023-12-04 DIAGNOSIS — I48.91 ATRIAL FIBRILLATION: Primary | ICD-10-CM

## 2023-12-04 PROBLEM — R11.0 NAUSEA: Status: RESOLVED | Noted: 2023-11-09 | Resolved: 2023-12-04

## 2023-12-04 PROBLEM — N18.4 CHRONIC KIDNEY DISEASE, STAGE 4 (SEVERE): Status: RESOLVED | Noted: 2017-12-20 | Resolved: 2023-12-04

## 2023-12-04 PROBLEM — I12.0: Chronic | Status: RESOLVED | Noted: 2017-06-17 | Resolved: 2023-12-04

## 2023-12-04 PROBLEM — Z95.5 HISTORY OF CORONARY ARTERY STENT PLACEMENT: Status: ACTIVE | Noted: 2023-03-15

## 2023-12-04 PROBLEM — J96.01 ACUTE RESPIRATORY FAILURE WITH HYPOXIA: Status: RESOLVED | Noted: 2023-11-10 | Resolved: 2023-12-04

## 2023-12-04 PROBLEM — Z23 PNEUMOCOCCAL VACCINE ADMINISTERED: Status: RESOLVED | Noted: 2018-11-13 | Resolved: 2023-12-04

## 2023-12-04 PROBLEM — G93.40 ENCEPHALOPATHY ACUTE: Status: RESOLVED | Noted: 2018-01-01 | Resolved: 2023-12-04

## 2023-12-04 PROBLEM — R79.1 SUPRATHERAPEUTIC INR: Status: RESOLVED | Noted: 2023-07-18 | Resolved: 2023-12-04

## 2023-12-04 PROBLEM — F33.1 MODERATE EPISODE OF RECURRENT MAJOR DEPRESSIVE DISORDER: Status: RESOLVED | Noted: 2018-01-17 | Resolved: 2023-12-04

## 2023-12-04 PROBLEM — J18.9 PNEUMONIA: Status: RESOLVED | Noted: 2023-11-09 | Resolved: 2023-12-04

## 2023-12-04 PROBLEM — Z99.2 STAGE 5 CHRONIC KIDNEY DISEASE ON CHRONIC DIALYSIS: Status: RESOLVED | Noted: 2018-11-13 | Resolved: 2023-12-04

## 2023-12-04 PROBLEM — R55 SYNCOPE: Status: RESOLVED | Noted: 2019-12-23 | Resolved: 2023-12-04

## 2023-12-04 PROBLEM — E87.6 HYPOKALEMIA: Status: RESOLVED | Noted: 2022-12-09 | Resolved: 2023-12-04

## 2023-12-04 LAB
ADENOVIRUS: NOT DETECTED
ALBUMIN SERPL BCP-MCNC: 3.7 G/DL (ref 3.5–5.2)
ALP SERPL-CCNC: 46 U/L (ref 55–135)
ALT SERPL W/O P-5'-P-CCNC: 9 U/L (ref 10–44)
ANION GAP SERPL CALC-SCNC: 18 MMOL/L (ref 8–16)
AST SERPL-CCNC: 27 U/L (ref 10–40)
BACTERIA #/AREA URNS HPF: NEGATIVE /HPF
BASOPHILS # BLD AUTO: 0.02 K/UL (ref 0–0.2)
BASOPHILS NFR BLD: 0.3 % (ref 0–1.9)
BILIRUB SERPL-MCNC: 0.8 MG/DL (ref 0.1–1)
BILIRUB UR QL STRIP: NEGATIVE
BNP SERPL-MCNC: 2609 PG/ML (ref 0–99)
BORDETELLA PARAPERTUSSIS (IS1001): NOT DETECTED
BORDETELLA PERTUSSIS (PTXP): NOT DETECTED
BUN SERPL-MCNC: 59 MG/DL (ref 8–23)
CALCIUM SERPL-MCNC: 8 MG/DL (ref 8.7–10.5)
CHLAMYDIA PNEUMONIAE: NOT DETECTED
CHLORIDE SERPL-SCNC: 97 MMOL/L (ref 95–110)
CLARITY UR: CLEAR
CO2 SERPL-SCNC: 25 MMOL/L (ref 23–29)
COLOR UR: YELLOW
CORONAVIRUS 229E, COMMON COLD VIRUS: NOT DETECTED
CORONAVIRUS HKU1, COMMON COLD VIRUS: NOT DETECTED
CORONAVIRUS NL63, COMMON COLD VIRUS: NOT DETECTED
CORONAVIRUS OC43, COMMON COLD VIRUS: NOT DETECTED
CREAT SERPL-MCNC: 10.6 MG/DL (ref 0.5–1.4)
DIFFERENTIAL METHOD BLD: ABNORMAL
EOSINOPHIL # BLD AUTO: 0 K/UL (ref 0–0.5)
EOSINOPHIL NFR BLD: 0.3 % (ref 0–8)
ERYTHROCYTE [DISTWIDTH] IN BLOOD BY AUTOMATED COUNT: 15.6 % (ref 11.5–14.5)
EST. GFR  (NO RACE VARIABLE): 3.3 ML/MIN/1.73 M^2
FLUBV RNA NPH QL NAA+NON-PROBE: NOT DETECTED
GLUCOSE SERPL-MCNC: 119 MG/DL (ref 70–110)
GLUCOSE UR QL STRIP: ABNORMAL
HCT VFR BLD AUTO: 33 % (ref 37–48.5)
HGB BLD-MCNC: 10.3 G/DL (ref 12–16)
HGB UR QL STRIP: ABNORMAL
HPIV1 RNA NPH QL NAA+NON-PROBE: NOT DETECTED
HPIV2 RNA NPH QL NAA+NON-PROBE: NOT DETECTED
HPIV3 RNA NPH QL NAA+NON-PROBE: NOT DETECTED
HPIV4 RNA NPH QL NAA+NON-PROBE: NOT DETECTED
HUMAN METAPNEUMOVIRUS: NOT DETECTED
HYALINE CASTS #/AREA URNS LPF: 6 /LPF
IMM GRANULOCYTES # BLD AUTO: 0.03 K/UL (ref 0–0.04)
IMM GRANULOCYTES NFR BLD AUTO: 0.5 % (ref 0–0.5)
INFLUENZA A (SUBTYPES H1,H1-2009,H3): NOT DETECTED
KETONES UR QL STRIP: ABNORMAL
LACTATE SERPL-SCNC: 1.8 MMOL/L (ref 0.5–1.9)
LEUKOCYTE ESTERASE UR QL STRIP: NEGATIVE
LIPASE SERPL-CCNC: 23 U/L (ref 4–60)
LYMPHOCYTES # BLD AUTO: 0.9 K/UL (ref 1–4.8)
LYMPHOCYTES NFR BLD: 13.7 % (ref 18–48)
MAGNESIUM SERPL-MCNC: 2 MG/DL (ref 1.6–2.6)
MCH RBC QN AUTO: 31.2 PG (ref 27–31)
MCHC RBC AUTO-ENTMCNC: 31.2 G/DL (ref 32–36)
MCV RBC AUTO: 100 FL (ref 82–98)
MICROSCOPIC COMMENT: ABNORMAL
MONOCYTES # BLD AUTO: 1.1 K/UL (ref 0.3–1)
MONOCYTES NFR BLD: 17.1 % (ref 4–15)
MYCOPLASMA PNEUMONIAE: NOT DETECTED
NEUTROPHILS # BLD AUTO: 4.4 K/UL (ref 1.8–7.7)
NEUTROPHILS NFR BLD: 68.1 % (ref 38–73)
NITRITE UR QL STRIP: NEGATIVE
NRBC BLD-RTO: 0 /100 WBC
PH UR STRIP: 8 [PH] (ref 5–8)
PLATELET # BLD AUTO: 156 K/UL (ref 150–450)
PMV BLD AUTO: 10.8 FL (ref 9.2–12.9)
POTASSIUM SERPL-SCNC: 5 MMOL/L (ref 3.5–5.1)
PROT SERPL-MCNC: 7.4 G/DL (ref 6–8.4)
PROT UR QL STRIP: ABNORMAL
RBC # BLD AUTO: 3.3 M/UL (ref 4–5.4)
RBC #/AREA URNS HPF: 41 /HPF (ref 0–4)
RESPIRATORY INFECTION PANEL SOURCE: ABNORMAL
RSV RNA NPH QL NAA+NON-PROBE: NOT DETECTED
RV+EV RNA NPH QL NAA+NON-PROBE: NOT DETECTED
SARS-COV-2 RNA RESP QL NAA+PROBE: DETECTED
SODIUM SERPL-SCNC: 140 MMOL/L (ref 136–145)
SP GR UR STRIP: 1.01 (ref 1–1.03)
SQUAMOUS #/AREA URNS HPF: 18 /HPF
TROPONIN I SERPL HS-MCNC: 71.1 PG/ML (ref 0–14.9)
TROPONIN I SERPL HS-MCNC: 71.2 PG/ML (ref 0–14.9)
URN SPEC COLLECT METH UR: ABNORMAL
UROBILINOGEN UR STRIP-ACNC: NEGATIVE EU/DL
WBC # BLD AUTO: 6.49 K/UL (ref 3.9–12.7)
WBC #/AREA URNS HPF: 2 /HPF (ref 0–5)

## 2023-12-04 PROCEDURE — 94761 N-INVAS EAR/PLS OXIMETRY MLT: CPT

## 2023-12-04 PROCEDURE — G0378 HOSPITAL OBSERVATION PER HR: HCPCS

## 2023-12-04 PROCEDURE — 93005 ELECTROCARDIOGRAM TRACING: CPT | Performed by: GENERAL PRACTICE

## 2023-12-04 PROCEDURE — 5A1D70Z PERFORMANCE OF URINARY FILTRATION, INTERMITTENT, LESS THAN 6 HOURS PER DAY: ICD-10-PCS | Performed by: INTERNAL MEDICINE

## 2023-12-04 PROCEDURE — 80053 COMPREHEN METABOLIC PANEL: CPT | Performed by: EMERGENCY MEDICINE

## 2023-12-04 PROCEDURE — 96365 THER/PROPH/DIAG IV INF INIT: CPT

## 2023-12-04 PROCEDURE — 99900035 HC TECH TIME PER 15 MIN (STAT)

## 2023-12-04 PROCEDURE — 93010 ELECTROCARDIOGRAM REPORT: CPT | Mod: ,,, | Performed by: GENERAL PRACTICE

## 2023-12-04 PROCEDURE — 36415 COLL VENOUS BLD VENIPUNCTURE: CPT | Performed by: EMERGENCY MEDICINE

## 2023-12-04 PROCEDURE — 83735 ASSAY OF MAGNESIUM: CPT | Performed by: EMERGENCY MEDICINE

## 2023-12-04 PROCEDURE — 96366 THER/PROPH/DIAG IV INF ADDON: CPT

## 2023-12-04 PROCEDURE — 27000221 HC OXYGEN, UP TO 24 HOURS

## 2023-12-04 PROCEDURE — 84484 ASSAY OF TROPONIN QUANT: CPT | Performed by: EMERGENCY MEDICINE

## 2023-12-04 PROCEDURE — 25000003 PHARM REV CODE 250: Performed by: STUDENT IN AN ORGANIZED HEALTH CARE EDUCATION/TRAINING PROGRAM

## 2023-12-04 PROCEDURE — 96375 TX/PRO/DX INJ NEW DRUG ADDON: CPT

## 2023-12-04 PROCEDURE — 93010 ELECTROCARDIOGRAM REPORT: CPT | Mod: 76,,, | Performed by: GENERAL PRACTICE

## 2023-12-04 PROCEDURE — 96376 TX/PRO/DX INJ SAME DRUG ADON: CPT

## 2023-12-04 PROCEDURE — 83690 ASSAY OF LIPASE: CPT | Performed by: EMERGENCY MEDICINE

## 2023-12-04 PROCEDURE — 99900031 HC PATIENT EDUCATION (STAT)

## 2023-12-04 PROCEDURE — 87798 DETECT AGENT NOS DNA AMP: CPT | Performed by: EMERGENCY MEDICINE

## 2023-12-04 PROCEDURE — 63600175 PHARM REV CODE 636 W HCPCS: Performed by: EMERGENCY MEDICINE

## 2023-12-04 PROCEDURE — 87040 BLOOD CULTURE FOR BACTERIA: CPT | Mod: 59 | Performed by: EMERGENCY MEDICINE

## 2023-12-04 PROCEDURE — 99285 EMERGENCY DEPT VISIT HI MDM: CPT | Mod: 25

## 2023-12-04 PROCEDURE — 85025 COMPLETE CBC W/AUTO DIFF WBC: CPT | Performed by: EMERGENCY MEDICINE

## 2023-12-04 PROCEDURE — 90935 HEMODIALYSIS ONE EVALUATION: CPT

## 2023-12-04 PROCEDURE — 63600175 PHARM REV CODE 636 W HCPCS: Performed by: STUDENT IN AN ORGANIZED HEALTH CARE EDUCATION/TRAINING PROGRAM

## 2023-12-04 PROCEDURE — 83880 ASSAY OF NATRIURETIC PEPTIDE: CPT | Performed by: EMERGENCY MEDICINE

## 2023-12-04 PROCEDURE — 81001 URINALYSIS AUTO W/SCOPE: CPT | Performed by: EMERGENCY MEDICINE

## 2023-12-04 PROCEDURE — 83605 ASSAY OF LACTIC ACID: CPT | Performed by: EMERGENCY MEDICINE

## 2023-12-04 RX ORDER — CALCIUM ACETATE 667 MG/1
667 CAPSULE ORAL DAILY
Status: DISCONTINUED | OUTPATIENT
Start: 2023-12-04 | End: 2023-12-09

## 2023-12-04 RX ORDER — ADENOSINE 3 MG/ML
6 INJECTION, SOLUTION INTRAVENOUS ONCE
Status: DISCONTINUED | OUTPATIENT
Start: 2023-12-04 | End: 2023-12-05

## 2023-12-04 RX ORDER — HEPARIN SODIUM 5000 [USP'U]/ML
5000 INJECTION, SOLUTION INTRAVENOUS; SUBCUTANEOUS
Status: DISCONTINUED | OUTPATIENT
Start: 2023-12-04 | End: 2023-12-15 | Stop reason: HOSPADM

## 2023-12-04 RX ORDER — DILTIAZEM HCL 1 MG/ML
0-15 INJECTION, SOLUTION INTRAVENOUS CONTINUOUS
Status: DISCONTINUED | OUTPATIENT
Start: 2023-12-04 | End: 2023-12-04

## 2023-12-04 RX ORDER — SODIUM CHLORIDE 0.9 % (FLUSH) 0.9 %
10 SYRINGE (ML) INJECTION
Status: DISCONTINUED | OUTPATIENT
Start: 2023-12-04 | End: 2023-12-15 | Stop reason: HOSPADM

## 2023-12-04 RX ORDER — ACETAMINOPHEN 325 MG/1
650 TABLET ORAL EVERY 8 HOURS PRN
Status: DISCONTINUED | OUTPATIENT
Start: 2023-12-04 | End: 2023-12-15 | Stop reason: HOSPADM

## 2023-12-04 RX ORDER — ONDANSETRON 2 MG/ML
4 INJECTION INTRAMUSCULAR; INTRAVENOUS
Status: COMPLETED | OUTPATIENT
Start: 2023-12-04 | End: 2023-12-04

## 2023-12-04 RX ORDER — ADENOSINE 3 MG/ML
12 INJECTION, SOLUTION INTRAVENOUS ONCE
Status: COMPLETED | OUTPATIENT
Start: 2023-12-04 | End: 2023-12-04

## 2023-12-04 RX ORDER — ONDANSETRON 2 MG/ML
4 INJECTION INTRAMUSCULAR; INTRAVENOUS EVERY 8 HOURS PRN
Status: DISCONTINUED | OUTPATIENT
Start: 2023-12-04 | End: 2023-12-15 | Stop reason: HOSPADM

## 2023-12-04 RX ORDER — ADENOSINE 3 MG/ML
6 INJECTION, SOLUTION INTRAVENOUS DAILY PRN
Status: DISCONTINUED | OUTPATIENT
Start: 2023-12-04 | End: 2023-12-06

## 2023-12-04 RX ORDER — NALOXONE HCL 0.4 MG/ML
0.02 VIAL (ML) INJECTION
Status: DISCONTINUED | OUTPATIENT
Start: 2023-12-04 | End: 2023-12-15 | Stop reason: HOSPADM

## 2023-12-04 RX ORDER — TALC
6 POWDER (GRAM) TOPICAL NIGHTLY PRN
Status: DISCONTINUED | OUTPATIENT
Start: 2023-12-04 | End: 2023-12-15 | Stop reason: HOSPADM

## 2023-12-04 RX ORDER — SODIUM CHLORIDE 9 MG/ML
INJECTION, SOLUTION INTRAVENOUS
Status: DISCONTINUED | OUTPATIENT
Start: 2023-12-04 | End: 2023-12-15 | Stop reason: HOSPADM

## 2023-12-04 RX ORDER — ATORVASTATIN CALCIUM 40 MG/1
40 TABLET, FILM COATED ORAL DAILY
Status: DISCONTINUED | OUTPATIENT
Start: 2023-12-04 | End: 2023-12-15 | Stop reason: HOSPADM

## 2023-12-04 RX ORDER — HYDRALAZINE HYDROCHLORIDE 20 MG/ML
5 INJECTION INTRAMUSCULAR; INTRAVENOUS
Status: COMPLETED | OUTPATIENT
Start: 2023-12-04 | End: 2023-12-04

## 2023-12-04 RX ORDER — DILTIAZEM HYDROCHLORIDE 5 MG/ML
0.25 INJECTION INTRAVENOUS ONCE
Status: COMPLETED | OUTPATIENT
Start: 2023-12-04 | End: 2023-12-04

## 2023-12-04 RX ORDER — DILTIAZEM HYDROCHLORIDE 120 MG/1
120 CAPSULE, COATED, EXTENDED RELEASE ORAL 2 TIMES DAILY
Status: DISCONTINUED | OUTPATIENT
Start: 2023-12-04 | End: 2023-12-05

## 2023-12-04 RX ORDER — SODIUM CHLORIDE 9 MG/ML
INJECTION, SOLUTION INTRAVENOUS ONCE
Status: DISCONTINUED | OUTPATIENT
Start: 2023-12-04 | End: 2023-12-05

## 2023-12-04 RX ORDER — LATANOPROST 50 UG/ML
1 SOLUTION/ DROPS OPHTHALMIC NIGHTLY
Status: DISCONTINUED | OUTPATIENT
Start: 2023-12-04 | End: 2023-12-15 | Stop reason: HOSPADM

## 2023-12-04 RX ORDER — SOTALOL HYDROCHLORIDE 80 MG/1
80 TABLET ORAL 2 TIMES DAILY
Status: DISCONTINUED | OUTPATIENT
Start: 2023-12-04 | End: 2023-12-04

## 2023-12-04 RX ORDER — DORZOLAMIDE HYDROCHLORIDE AND TIMOLOL MALEATE 20; 5 MG/ML; MG/ML
1 SOLUTION/ DROPS OPHTHALMIC 2 TIMES DAILY
Status: DISCONTINUED | OUTPATIENT
Start: 2023-12-04 | End: 2023-12-15 | Stop reason: HOSPADM

## 2023-12-04 RX ADMIN — DORZOLAMIDE HYDROCHLORIDE AND TIMOLOL MALEATE 1 DROP: 20; 5 SOLUTION/ DROPS OPHTHALMIC at 09:12

## 2023-12-04 RX ADMIN — ONDANSETRON 4 MG: 2 INJECTION INTRAMUSCULAR; INTRAVENOUS at 07:12

## 2023-12-04 RX ADMIN — APIXABAN 2.5 MG: 2.5 TABLET, FILM COATED ORAL at 09:12

## 2023-12-04 RX ADMIN — DILTIAZEM HYDROCHLORIDE 5 MG/HR: 100 INJECTION, POWDER, LYOPHILIZED, FOR SOLUTION INTRAVENOUS at 06:12

## 2023-12-04 RX ADMIN — ADENOSINE 6 MG: 3 INJECTION, SOLUTION INTRAVENOUS at 03:12

## 2023-12-04 RX ADMIN — DILTIAZEM HYDROCHLORIDE 120 MG: 120 CAPSULE, COATED, EXTENDED RELEASE ORAL at 09:12

## 2023-12-04 RX ADMIN — HYDRALAZINE HYDROCHLORIDE 5 MG: 20 INJECTION INTRAMUSCULAR; INTRAVENOUS at 07:12

## 2023-12-04 RX ADMIN — DILTIAZEM HYDROCHLORIDE 12 MG: 5 INJECTION INTRAVENOUS at 05:12

## 2023-12-04 RX ADMIN — LATANOPROST 1 DROP: 50 SOLUTION OPHTHALMIC at 09:12

## 2023-12-04 RX ADMIN — ADENOSINE 12 MG: 3 INJECTION, SOLUTION INTRAVENOUS at 03:12

## 2023-12-04 NOTE — HPI
Tyra Isaac is an 83 year old female with a past medical history of ESRD (MWF), Afib, HTN, anemia, and CAD who presented with worsening fatigue, myalgias, constipation, and cough. She was supposed to go to HD today, but instead she presented to the ED. She was discovered to have COVID-19 on workup with no evidence of pneumonia on CXR. Nephrology was consulted from the ED HD. Hospital Medicine was consulted for admission.

## 2023-12-04 NOTE — ASSESSMENT & PLAN NOTE
No evidence of pneumonia and respiratory failure.  -COVID-19 precautions  -Trend inflammatory markers

## 2023-12-04 NOTE — ED PROVIDER NOTES
Encounter Date: 12/4/2023       History     Chief Complaint   Patient presents with    Vomiting    Cough    Fatigue     Started with weakness 2 days ago. Dialysis mwf, started vomiting this am.       83-year-old female with history of end-stage renal disease with dialysis on Monday Wednesday Friday followed by Dr. Maynard with right upper extremity AV fistula, last dialysis on Friday, atrial fibrillation on Eliquis, hypertension, hyperlipidemia, anxiety, gout, CVA, COPD.  Patient with recent diagnosis of pneumonia 2 weeks ago, completed antibiotics.  She presents emergency department with complaint of persistent cough, shortness of breath, generalized weakness, nausea, vomiting over last 2 days.  Patient states that she has generalized pain all over.  Was to go to dialysis today at 6:00 a.m. however came to the emergency department due to generalized weakness, cough, fatigue.  Her son who is her primary caretaker states that he feels his mother has too many medications to take this is also causing her to feel bad.      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    Osteopenia of multiple sites 3/9/2018    Based upon bone density measurements. Patient also has chronic kidney disease.    Stroke 11/2016     Past Surgical History:   Procedure Laterality Date    ANGIOGRAM, CORONARY, WITH LEFT HEART CATHETERIZATION  N/A 2/7/2022    Procedure: Angiogram, Coronary, with Left Heart Cath;  Surgeon: Gino Leal MD;  Location: [facility] CATH/EP LAB;  Service: Cardiology;  Laterality: N/A;    CARDIAC SURGERY      stents    EYE SURGERY      WRIST SURGERY       Family History   Problem Relation Age of Onset    Heart disease Mother     Cancer Father      Social History     Tobacco Use    Smoking status: Never    Smokeless tobacco: Never   Substance Use Topics    Alcohol use: No    Drug use: No     Review of Systems   Constitutional:  Positive for fatigue. Negative for fever.   HENT:  Negative for sore throat.    Respiratory:  Positive for cough. Negative for shortness of breath.    Cardiovascular:  Negative for chest pain.   Gastrointestinal:  Negative for nausea.   Genitourinary:  Negative for dysuria.   Musculoskeletal:  Negative for back pain.   Skin:  Negative for rash.   Neurological:  Positive for weakness.   Hematological:  Does not bruise/bleed easily.       Physical Exam     Initial Vitals [12/04/23 0636]   BP Pulse Resp Temp SpO2   (!) 192/80 74 (!) 26 98.5 °F (36.9 °C) 98 %      MAP       --         Physical Exam    Nursing note and vitals reviewed.  Constitutional: She appears well-developed and well-nourished.   Appears uncomfortable, following commands, GCS 15   HENT:   Head: Normocephalic and atraumatic.   Nose: Nose normal.   Mouth/Throat: Oropharynx is clear and moist.   Eyes: Conjunctivae and EOM are normal. Pupils are equal, round, and reactive to light.   Neck: Neck supple. No thyromegaly present. No tracheal deviation present.   Normal range of motion.  Cardiovascular:  Normal rate, regular rhythm, normal heart sounds and intact distal pulses.     Exam reveals no gallop and no friction rub.       No murmur heard.  Pulmonary/Chest: No stridor. No respiratory distress.   Course bilateral breath sounds no adventitious sounds   Abdominal: Abdomen is soft. Bowel sounds are normal. She exhibits no distension and no mass.  There is no abdominal tenderness. There is no rebound and no guarding.   Musculoskeletal:         General: No edema. Normal range of motion.      Cervical back: Normal range of motion and neck supple.     Lymphadenopathy:     She has no cervical adenopathy.   Neurological: She is alert and oriented to person, place, and time. She has normal strength and normal reflexes. GCS score is 15. GCS eye subscore is 4. GCS verbal subscore is 5. GCS motor subscore is 6.   Skin: Skin is warm and dry. Capillary refill takes less than 2 seconds.   Psychiatric: She has a normal mood and affect.         ED Course   Procedures  Labs Reviewed   RESPIRATORY INFECTION PANEL (PCR), NASOPHARYNGEAL - Abnormal; Notable for the following components:       Result Value    SARS-CoV2 (COVID-19) Qualitative PCR Detected (*)     All other components within normal limits    Narrative:     Specimen Source->Nasopharyngeal Swab   CBC W/ AUTO DIFFERENTIAL - Abnormal; Notable for the following components:    RBC 3.30 (*)     Hemoglobin 10.3 (*)     Hematocrit 33.0 (*)      (*)     MCH 31.2 (*)     MCHC 31.2 (*)     RDW 15.6 (*)     Lymph # 0.9 (*)     Mono # 1.1 (*)     Lymph % 13.7 (*)     Mono % 17.1 (*)     All other components within normal limits   COMPREHENSIVE METABOLIC PANEL - Abnormal; Notable for the following components:    Glucose 119 (*)     BUN 59 (*)     Creatinine 10.6 (*)     Calcium 8.0 (*)     Alkaline Phosphatase 46 (*)     ALT 9 (*)     eGFR 3.3 (*)     Anion Gap 18 (*)     All other components within normal limits   TROPONIN I HIGH SENSITIVITY - Abnormal; Notable for the following components:    Troponin I High Sensitivity 71.2 (*)     All other components within normal limits   B-TYPE NATRIURETIC PEPTIDE - Abnormal; Notable for the following components:    BNP 2,609 (*)     All other components within normal limits   URINALYSIS - Abnormal; Notable for the following components:    Protein, UA 2+ (*)     Glucose, UA 1+ (*)      Ketones, UA Trace (*)     Occult Blood UA 2+ (*)     All other components within normal limits    Narrative:     Collection Type->Urine, Clean Catch   URINALYSIS MICROSCOPIC - Abnormal; Notable for the following components:    RBC, UA 41 (*)     Hyaline Casts, UA 6 (*)     All other components within normal limits    Narrative:     Collection Type->Urine, Clean Catch   CULTURE, BLOOD    Narrative:     Collection has been rescheduled by [email or web address] at 12/04/2023 07:14 Reason:   Done per phil  Collection has been rescheduled by [email or web address] at 12/04/2023 07:14 Reason:   Done per phil   CULTURE, BLOOD    Narrative:     Collection has been rescheduled by [email or web address] at 12/04/2023 07:14 Reason:   Done per phil  Collection has been rescheduled by [email or web address] at 12/04/2023 07:14 Reason:   Done per phil   MAGNESIUM   LIPASE   LACTIC ACID, PLASMA   TROPONIN I HIGH SENSITIVITY        ECG Results              EKG 12-lead (In process)  Result time 12/04/23 07:36:43      In process by Interface, Lab In [facility] (12/04/23 07:36:43)                   Narrative:    Test Reason : R07.9,    Vent. Rate : 070 BPM     Atrial Rate : 070 BPM     P-R Int : 156 ms          QRS Dur : 076 ms      QT Int : 498 ms       P-R-T Axes : 084 049 049 degrees     QTc Int : 537 ms    Sinus rhythm with Premature atrial complexes  Septal infarct (cited on or before 22-JUL-2023)  Prolonged QT  Abnormal ECG  When compared with ECG of 10-NOV-2023 07:46,  Premature atrial complexes are now Present  Nonspecific T wave abnormality has replaced inverted T waves in Anterior  leads  QT has shortened    Referred By: [email or web address]   SELF           Confirmed By:                                   Imaging Results              X-Ray Chest AP Portable (Final result)  Result time 12/04/23 08:16:15      Final result by Aleksandar Castillo MD (12/04/23 08:16:15)                   Narrative:    XR CHEST 1 VIEW    CLINICAL HISTORY:  83 years Female Chest Pain    COMPARISON: November 9,  2023    FINDINGS: Cardiac silhouette size is within normal limits. Atherosclerotic calcification of the aorta. No confluent airspace disease. No pleural effusion or pneumothorax. No acute osseous abnormality.    IMPRESSION:    No acute pulmonary pathology.    Electronically signed by:  Aleksandar Castillo MD  12/04/2023 08:16 AM CST Workstation: [id number]                                     Medications   epoetin rosy-epbx injection 2,400 Units (has no administration in time range)   ondansetron injection 4 mg (4 mg Intravenous Given 12/4/23 0710)   hydrALAZINE injection 5 mg (5 mg Intravenous Given 12/4/23 0709)     Medical Decision Making  Amount and/or Complexity of Data Reviewed  Labs: ordered.  Radiology: ordered.    Risk  Prescription drug management.  Decision regarding hospitalization.                          Medical Decision Making:   Initial Assessment:   83-year-old female with history of end-stage renal disease with dialysis on Monday Wednesday Friday followed by Dr. Maynard with right upper extremity AV fistula, last dialysis on Friday, atrial fibrillation on Eliquis, hypertension, hyperlipidemia, anxiety, gout, CVA, COPD.  Patient with recent diagnosis of pneumonia 1 week ago.  She presents emergency department with complaint of persistent cough, shortness of breath, generalized weakness, nausea, vomiting over last 2 days.  Patient states that she has generalized pain all over.  Was to go to dialysis today at 6:00 a.m. however came to the emergency department due to generalized weakness, cough, fatigue.      Differential Diagnosis:   Pneumonia, pulmonary edema, hypervolemia, pancreatitis, viral syndrome  Clinical Tests:   Lab Tests: Ordered and Reviewed  Radiological Study: Ordered and Reviewed  Medical Tests: Ordered and Reviewed             Clinical Impression:  Final diagnoses:  [N18.6] End stage renal disease (Primary)  [R53.1] Generalized weakness  [U07.1] COVID-19 virus infection          ED  Disposition Condition    Admit Stable                Danny Whyte MD  12/04/23 112

## 2023-12-04 NOTE — H&P
Critical access hospital - Emergency Dept  Hospital Medicine  History & Physical    Patient Name: Tyra Isaac  MRN: [id number]  Patient Class: OP- Observation  Admission Date: 12/4/2023  Attending Physician: Juanito Ahn MD   Primary Care Provider: Kalpesh Goel MD         Patient information was obtained from patient, past medical records, and ER records.     Subjective:     Principal Problem:COVID-19    Chief Complaint:   Chief Complaint   Patient presents with    Vomiting    Cough    Fatigue     Started with weakness 2 days ago. Dialysis mwf, started vomiting this am.          HPI: Tyra Isaac is an 83 year old female with a past medical history of ESRD (MWF), Afib, HTN, anemia, and CAD who presented with worsening fatigue, myalgias, constipation, and cough. She was supposed to go to HD today, but instead she presented to the ED. She was discovered to have COVID-19 on workup with no evidence of pneumonia on CXR. Nephrology was consulted from the ED HD. Hospital Medicine was consulted for admission.    Past Medical History:   Diagnosis Date    A-fib     Anxiety     Depression     Disorder of kidney and ureter     Encephalopathy acute 1/1/2018    End stage kidney disease 6/17/2017    Gout     Hyperlipidemia     Hypertension     Moderate episode of recurrent major depressive disorder 1/17/2018    Nephropathy hypertensive, 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    Osteopenia of multiple sites 3/9/2018    Based upon bone density measurements. Patient also has chronic kidney disease.    Stroke 11/2016       Past Surgical History:   Procedure Laterality Date    ANGIOGRAM, CORONARY, WITH LEFT HEART CATHETERIZATION N/A 2/7/2022    Procedure: Angiogram, Coronary, with Left Heart Cath;  Surgeon: Gino Leal MD;  Location: [facility] CATH/EP LAB;  Service: Cardiology;   Laterality: N/A;    CARDIAC SURGERY      stents    EYE SURGERY      WRIST SURGERY         Review of patient's allergies indicates:   Allergen Reactions    Cyclobenzaprine     Fish containing products Hives    Peanut Other (See Comments)    Tramadol Itching       No current facility-administered medications on file prior to encounter.     Current Outpatient Medications on File Prior to Encounter   Medication Sig    atorvastatin (LIPITOR) 40 MG tablet Take 40 mg by mouth once daily.    b complex vitamins tablet Take 1 tablet by mouth once daily.    calcium acetate,phosphat bind, (PHOSLO) 667 mg tablet Take 667 mg by mouth 2 (two) times daily with meals.    diltiaZEM (CARDIZEM CD) 120 MG Cp24 Take 1 capsule (120 mg total) by mouth 2 (two) times a day.    dorzolamide-timolol 2-0.5% (COSOPT) 22.3-6.8 mg/mL ophthalmic solution Place 1 drop into both eyes 2 (two) times daily.    ELIQUIS 2.5 mg Tab Take 2.5 mg by mouth 2 (two) times daily.    latanoprost 0.005 % ophthalmic solution Place 1 drop into both eyes every evening.    loperamide (IMODIUM A-D) 2 mg Tab Take 2 mg by mouth 4 (four) times daily as needed.    ondansetron (ZOFRAN) 4 MG tablet Take 1 tablet (4 mg total) by mouth every 8 (eight) hours as needed for Nausea.    sotaloL (BETAPACE) 80 MG tablet Take 1 tablet (80 mg total) by mouth 2 (two) times daily.     Family History       Problem Relation (Age of Onset)    Cancer Father    Heart disease Mother          Tobacco Use    Smoking status: Never    Smokeless tobacco: Never   Substance and Sexual Activity    Alcohol use: No    Drug use: No    Sexual activity: Not Currently     Review of Systems   Constitutional:  Positive for activity change, fatigue and fever.   Respiratory:  Positive for cough. Negative for shortness of breath.    Musculoskeletal:  Positive for myalgias.     Objective:     Vital Signs (Most Recent):  Temp: 98.5 °F (36.9 °C) (12/04/23 0636)  Pulse: 71 (12/04/23 1200)  Resp: (!) 26 (12/04/23  0636)  BP: (!) 131/56 (12/04/23 1200)  SpO2: 95 % (12/04/23 1200) Vital Signs (24h Range):  Temp:  [98.5 °F (36.9 °C)] 98.5 °F (36.9 °C)  Pulse:  [71-74] 71  Resp:  [26] 26  SpO2:  [95 %-99 %] 95 %  BP: (131-192)/(56-80) 131/56     Weight: 48.1 kg (106 lb)  Body mass index is 17.11 kg/m².     Physical Exam  Vitals and nursing note reviewed.   Constitutional:       General: She is not in acute distress.     Appearance: She is ill-appearing.   HENT:      Head: Normocephalic and atraumatic.      Right Ear: External ear normal.      Left Ear: External ear normal.      Nose: Nose normal.      Mouth/Throat:      Mouth: Mucous membranes are moist.      Pharynx: Oropharynx is clear.   Eyes:      Extraocular Movements: Extraocular movements intact.      Conjunctiva/sclera: Conjunctivae normal.   Cardiovascular:      Rate and Rhythm: Normal rate and regular rhythm.      Pulses: Normal pulses.      Heart sounds: Normal heart sounds.      Comments: RUE AVF with thrill.  Pulmonary:      Effort: Pulmonary effort is normal.      Breath sounds: Normal breath sounds.      Comments: RA.  Abdominal:      General: Bowel sounds are normal. There is no distension.      Palpations: Abdomen is soft.      Tenderness: There is no abdominal tenderness.   Musculoskeletal:         General: Normal range of motion.      Cervical back: Normal range of motion and neck supple.   Skin:     General: Skin is warm and dry.   Neurological:      Mental Status: She is alert. Mental status is at baseline.   Psychiatric:         Mood and Affect: Mood normal.         Behavior: Behavior normal.                Significant Labs: All pertinent labs within the past 24 hours have been reviewed.    Significant Imaging: I have reviewed all pertinent imaging results/findings within the past 24 hours.  Assessment/Plan:     * COVID-19  No evidence of pneumonia and respiratory failure.  -COVID-19 precautions  -Trend inflammatory markers    Benign hypertension with ESRD  (end-stage renal disease)  -Continue home diltiazem  -HD per Nephrology  -Renally dose medications    CAD s/p PCI  -Continue home statin  -Telemetry    Atrial fibrillation  -Eliquis  -Diltiazem  -Sotalol  -Telemetry    Anemia of chronic disease  Stable.  -Trend Hgb with CBC      VTE Risk Mitigation (From admission, onward)           Ordered     apixaban tablet 2.5 mg  2 times daily         12/04/23 1145     IP VTE HIGH RISK PATIENT  Once         12/04/23 1145     Place sequential compression device  Until discontinued         12/04/23 1145     Reason for No Pharmacological VTE Prophylaxis  Once        Question:  Reasons:  Answer:  Already adequately anticoagulated on oral Anticoagulants    12/04/23 1145     heparin (porcine) injection 5,000 Units  As needed (PRN)         12/04/23 1145                       On 12/04/2023, patient should be placed in hospital observation services under my care.             Juanito Ahn MD  Department of Hospital Medicine  [facility] - Emergency Dept

## 2023-12-04 NOTE — SUBJECTIVE & OBJECTIVE
Past Medical History:   Diagnosis Date    A-fib     Anxiety     Depression     Disorder of kidney and ureter     Encephalopathy acute 1/1/2018    End stage kidney disease 6/17/2017    Gout     Hyperlipidemia     Hypertension     Moderate episode of recurrent major depressive disorder 1/17/2018    Nephropathy hypertensive, 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    Osteopenia of multiple sites 3/9/2018    Based upon bone density measurements. Patient also has chronic kidney disease.    Stroke 11/2016       Past Surgical History:   Procedure Laterality Date    ANGIOGRAM, CORONARY, WITH LEFT HEART CATHETERIZATION N/A 2/7/2022    Procedure: Angiogram, Coronary, with Left Heart Cath;  Surgeon: Gino Leal MD;  Location: [facility] CATH/EP LAB;  Service: Cardiology;  Laterality: N/A;    CARDIAC SURGERY      stents    EYE SURGERY      WRIST SURGERY         Review of patient's allergies indicates:   Allergen Reactions    Cyclobenzaprine     Fish containing products Hives    Peanut Other (See Comments)    Tramadol Itching       No current facility-administered medications on file prior to encounter.     Current Outpatient Medications on File Prior to Encounter   Medication Sig    atorvastatin (LIPITOR) 40 MG tablet Take 40 mg by mouth once daily.    b complex vitamins tablet Take 1 tablet by mouth once daily.    calcium acetate,phosphat bind, (PHOSLO) 667 mg tablet Take 667 mg by mouth 2 (two) times daily with meals.    diltiaZEM (CARDIZEM CD) 120 MG Cp24 Take 1 capsule (120 mg total) by mouth 2 (two) times a day.    dorzolamide-timolol 2-0.5% (COSOPT) 22.3-6.8 mg/mL ophthalmic solution Place 1 drop into both eyes 2 (two) times daily.    ELIQUIS 2.5 mg Tab Take 2.5 mg by mouth 2 (two) times daily.    latanoprost 0.005 % ophthalmic solution Place 1 drop into both eyes every evening.    loperamide  (IMODIUM A-D) 2 mg Tab Take 2 mg by mouth 4 (four) times daily as needed.    ondansetron (ZOFRAN) 4 MG tablet Take 1 tablet (4 mg total) by mouth every 8 (eight) hours as needed for Nausea.    sotaloL (BETAPACE) 80 MG tablet Take 1 tablet (80 mg total) by mouth 2 (two) times daily.     Family History       Problem Relation (Age of Onset)    Cancer Father    Heart disease Mother          Tobacco Use    Smoking status: Never    Smokeless tobacco: Never   Substance and Sexual Activity    Alcohol use: No    Drug use: No    Sexual activity: Not Currently     Review of Systems   Constitutional:  Positive for activity change, fatigue and fever.   Respiratory:  Positive for cough. Negative for shortness of breath.    Musculoskeletal:  Positive for myalgias.     Objective:     Vital Signs (Most Recent):  Temp: 98.5 °F (36.9 °C) (12/04/23 0636)  Pulse: 71 (12/04/23 1200)  Resp: (!) 26 (12/04/23 0636)  BP: (!) 131/56 (12/04/23 1200)  SpO2: 95 % (12/04/23 1200) Vital Signs (24h Range):  Temp:  [98.5 °F (36.9 °C)] 98.5 °F (36.9 °C)  Pulse:  [71-74] 71  Resp:  [26] 26  SpO2:  [95 %-99 %] 95 %  BP: (131-192)/(56-80) 131/56     Weight: 48.1 kg (106 lb)  Body mass index is 17.11 kg/m².     Physical Exam  Vitals and nursing note reviewed.   Constitutional:       General: She is not in acute distress.     Appearance: She is ill-appearing.   HENT:      Head: Normocephalic and atraumatic.      Right Ear: External ear normal.      Left Ear: External ear normal.      Nose: Nose normal.      Mouth/Throat:      Mouth: Mucous membranes are moist.      Pharynx: Oropharynx is clear.   Eyes:      Extraocular Movements: Extraocular movements intact.      Conjunctiva/sclera: Conjunctivae normal.   Cardiovascular:      Rate and Rhythm: Normal rate and regular rhythm.      Pulses: Normal pulses.      Heart sounds: Normal heart sounds.      Comments: RUE AVF with thrill.  Pulmonary:      Effort: Pulmonary effort is normal.      Breath sounds: Normal  breath sounds.      Comments: RA.  Abdominal:      General: Bowel sounds are normal. There is no distension.      Palpations: Abdomen is soft.      Tenderness: There is no abdominal tenderness.   Musculoskeletal:         General: Normal range of motion.      Cervical back: Normal range of motion and neck supple.   Skin:     General: Skin is warm and dry.   Neurological:      Mental Status: She is alert. Mental status is at baseline.   Psychiatric:         Mood and Affect: Mood normal.         Behavior: Behavior normal.                Significant Labs: All pertinent labs within the past 24 hours have been reviewed.    Significant Imaging: I have reviewed all pertinent imaging results/findings within the past 24 hours.

## 2023-12-04 NOTE — NURSING
Fernanda texted back to remove the needles and will try HD tomorrow. [place] removed in her room in PCU, hemostasis achieved in 6 mins, +B&T.

## 2023-12-04 NOTE — PROGRESS NOTES
12/04/23 1545   Handoff Report   Received From Hector CONROY RN   Given To Ric KAHN RN   Treatment Type   Treatment Type Maintenance   Vital Signs   Temp 99.8 °F (37.7 °C)   Temp Source Oral   Pulse (!) 157   Heart Rate Source Monitor   Resp 20   SpO2 96 %   Flow (L/min) 2   Device (Oxygen Therapy) nasal cannula   /63   BP Method Automatic   Patient Position Lying   Assessments (Pre/Post)   Consent Obtained yes   Safety vein preservation armband present   Date Hepatitis Profile Obtained 11/11/23   Blood Liters Processed (BLP) 23.4   Transport Modality not applicable   Level of Consciousness (AVPU) alert   Dialyzer Clearance mildly streaked   Pain   Pain Rating (0-10): Rest 0   Post-Hemodialysis Assessment   Rinseback Volume (mL) 250 mL   Blood Volume Processed (Liters) 23.4 L   Dialyzer Clearance Lightly streaked   Duration of Treatment 65 minutes   Additional Fluid Intake (mL) 500 mL   Total UF (mL) 723 mL   Net Fluid Removal 223   Patient Response to Treatment pt did not daniela tx   Post-Treatment Weight 50.1 kg (110 lb 7.2 oz)   Treatment Weight Change -0.2   Post-Hemodialysis Comments pt to be transfered to PCU     40 mins into the tx, pt's HR increased to 150-170's, U?F turned off and  paged.  called back to say to rinse pt back. Pt rinsed back and needles taped to pt's arm pending a deciesion of if tx will be restarted. Pt ran a total of 65 mins with a net u/f of 223 ml

## 2023-12-04 NOTE — PLAN OF CARE
Patient was undergoing dialysis at the bedside when her heart rate began to increase on telemetry. EKG showed SVT. She received 6 mg followed by 12 mg of adenosine without improvement in her heart rate. Cardiology was consulted and recommended a diltiazem bolus and infusion. The patient will be transferred to the stepdown unit.

## 2023-12-04 NOTE — PHARMACY MED REC
"Admission Medication History     The home medication history was taken by Khari Hicks.    You may go to "Admission" then "Reconcile Home Medications" tabs to review and/or act upon these items.     The home medication list has been updated by the Pharmacy department.   Please read ALL comments highlighted in yellow.   Please address this information as you see fit.    Feel free to contact us if you have any questions or require assistance.        Medications listed below were obtained from: Patient/family and Analytic software- [employer]  No current facility-administered medications on file prior to encounter.     Current Outpatient Medications on File Prior to Encounter   Medication Sig Dispense Refill    atorvastatin (LIPITOR) 40 MG tablet Take 40 mg by mouth once daily.      b complex vitamins tablet Take 1 tablet by mouth once daily.      calcium acetate,phosphat bind, (PHOSLO) 667 mg tablet Take 667 mg by mouth 2 (two) times daily with meals.      diltiaZEM (CARDIZEM CD) 120 MG Cp24 Take 1 capsule (120 mg total) by mouth 2 (two) times a day.      dorzolamide-timolol 2-0.5% (COSOPT) 22.3-6.8 mg/mL ophthalmic solution Place 1 drop into both eyes 2 (two) times daily.      ELIQUIS 2.5 mg Tab Take 2.5 mg by mouth 2 (two) times daily.      latanoprost 0.005 % ophthalmic solution Place 1 drop into both eyes every evening.      loperamide (IMODIUM A-D) 2 mg Tab Take 2 mg by mouth 4 (four) times daily as needed.      ondansetron (ZOFRAN) 4 MG tablet Take 1 tablet (4 mg total) by mouth every 8 (eight) hours as needed for Nausea. 15 tablet 0    sotaloL (BETAPACE) 80 MG tablet Take 1 tablet (80 mg total) by mouth 2 (two) times daily. 60 tablet 0       Potential issues to be addressed PRIOR TO DISCHARGE  Please discuss with the patient barriers to adherence with medication treatment plans  Patient requires education regarding drug therapies     Khari Hicks  [id number]              .          "

## 2023-12-04 NOTE — CONSULTS
INPATIENT NEPHROLOGY CONSULT   [facility] NEPHROLOGY    Tyra Isaac  12/04/2023    Reason for consultation:    ESRD    Chief Complaint:   Chief Complaint   Patient presents with    Vomiting    Cough    Fatigue     Started with weakness 2 days ago. Dialysis mwf, started vomiting this am.            History of Present Illness:    Per ER  83-year-old female with history of end-stage renal disease with dialysis on Monday Wednesday Friday followed by Dr. Maynard with right upper extremity AV fistula, last dialysis on Friday, atrial fibrillation on Eliquis, hypertension, hyperlipidemia, anxiety, gout, CVA, COPD.  Patient with recent diagnosis of pneumonia 2 weeks ago, completed antibiotics.  She presents emergency department with complaint of persistent cough, shortness of breath, generalized weakness, nausea, vomiting over last 2 days.  Patient states that she has generalized pain all over.  Was to go to dialysis today at 6:00 a.m. however came to the emergency department due to generalized weakness, cough, fatigue.  Her son who is her primary caretaker states that he feels his mother has too many medications to take this is also causing her to feel bad.         Plan of Care:       Assessment:    esrd  --continue dialysis per routine  --fluid restrict  --renal dose medication per routine  --continue outpt medication  --continue binders with meals    Anemia  --erythropoiesis stimulating agent with renal replacement therapy    Hyperphosphatemia  --renal diet  --continue binders    Hypertension  --uf with hd  --fluid restrict  --low salt diet  --continue home medication    Pulmonary edema  --uf as tolerated  --fluid restrict          Thank you for allowing us to participate in this patient's care. We will continue to follow.    Vital Signs:  Temp Readings from Last 3 Encounters:   12/04/23 98.5 °F (36.9 °C) (Oral)   11/14/23 98.2 °F (36.8 °C) (Oral)   07/22/23 97.9 °F (36.6 °C) (Oral)       Pulse Readings from Last 3  Encounters:   12/04/23 72   11/29/23 (!) 50   11/14/23 68       BP Readings from Last 3 Encounters:   12/04/23 (!) 142/64   11/29/23 (!) 130/54   11/14/23 121/60       Weight:  Wt Readings from Last 3 Encounters:   12/04/23 48.1 kg (106 lb)   11/29/23 48.5 kg (107 lb)   11/09/23 52.2 kg (115 lb 1.3 oz)       Past Medical & Surgical History:  Past Medical History:   Diagnosis Date    A-fib     Anxiety     Depression     Disorder of kidney and ureter     Encephalopathy acute 1/1/2018    End stage kidney disease 6/17/2017    Gout     Hyperlipidemia     Hypertension     Moderate episode of recurrent major depressive disorder 1/17/2018    Nephropathy hypertensive, 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    Osteopenia of multiple sites 3/9/2018    Based upon bone density measurements. Patient also has chronic kidney disease.    Stroke 11/2016       Past Surgical History:   Procedure Laterality Date    ANGIOGRAM, CORONARY, WITH LEFT HEART CATHETERIZATION N/A 2/7/2022    Procedure: Angiogram, Coronary, with Left Heart Cath;  Surgeon: Gino Leal MD;  Location: [facility] CATH/EP LAB;  Service: Cardiology;  Laterality: N/A;    CARDIAC SURGERY      stents    EYE SURGERY      WRIST SURGERY         Past Social History:  Social History     Socioeconomic History    Marital status:      Spouse name: Aria Isaac    Number of children: 3   Occupational History    Occupation: Not working   Tobacco Use    Smoking status: Never    Smokeless tobacco: Never   Substance and Sexual Activity    Alcohol use: No    Drug use: No    Sexual activity: Not Currently     Social Determinants of Health     Financial Resource Strain: Medium Risk (6/15/2022)    Overall Financial Resource Strain (CARDIA)     Difficulty of Paying Living Expenses: Somewhat hard   Food Insecurity: No Food Insecurity (6/15/2022)     Hunger Vital Sign     Worried About Running Out of Food in the Last Year: Never true     Ran Out of Food in the Last Year: Never true   Transportation Needs: Unmet Transportation Needs (3/29/2023)    PRAPARE - Transportation     Lack of Transportation (Medical): Yes     Lack of Transportation (Non-Medical): No   Physical Activity: Inactive (6/15/2022)    Exercise Vital Sign     Days of Exercise per Week: 0 days     Minutes of Exercise per Session: 0 min   Stress: Stress Concern Present (6/15/2022)    [employer] [place] [employer] - Occupational Stress Questionnaire     Feeling of Stress : To some extent   Social Connections: Moderately Isolated (6/15/2022)    Social Connection and Isolation Panel [NHANES]     Frequency of Communication with Friends and Family: Three times a week     Frequency of Social Gatherings with Friends and Family: Three times a week     Attends Christianity Services: 1 to 4 times per year     Active Member of Clubs or Organizations: No     Attends Club or Organization Meetings: Never     Marital Status:    Housing Stability: Low Risk  (6/15/2022)    Housing Stability Vital Sign     Unable to Pay for Housing in the Last Year: No     Number of Places Lived in the Last Year: 1     Unstable Housing in the Last Year: No       Medications:  No current facility-administered medications on file prior to encounter.     Current Outpatient Medications on File Prior to Encounter   Medication Sig Dispense Refill    atorvastatin (LIPITOR) 40 MG tablet Take 40 mg by mouth once daily.      b complex vitamins tablet Take 1 tablet by mouth once daily.      calcium acetate,phosphat bind, (PHOSLO) 667 mg tablet Take 667 mg by mouth 2 (two) times daily with meals.      diltiaZEM (CARDIZEM CD) 120 MG Cp24 Take 1 capsule (120 mg total) by mouth 2 (two) times a day.      dorzolamide-timolol 2-0.5% (COSOPT) 22.3-6.8 mg/mL ophthalmic solution Place 1 drop into both eyes 2 (two) times daily.       "ELIQUIS 2.5 mg Tab Take 2.5 mg by mouth 2 (two) times daily.      latanoprost 0.005 % ophthalmic solution Place 1 drop into both eyes every evening.      loperamide (IMODIUM A-D) 2 mg Tab Take 2 mg by mouth 4 (four) times daily as needed.      ondansetron (ZOFRAN) 4 MG tablet Take 1 tablet (4 mg total) by mouth every 8 (eight) hours as needed for Nausea. 15 tablet 0    sotaloL (BETAPACE) 80 MG tablet Take 1 tablet (80 mg total) by mouth 2 (two) times daily. 60 tablet 0     Scheduled Meds:  Continuous Infusions:  PRN Meds:.    Allergies:  Cyclobenzaprine, Fish containing products, Peanut, and Tramadol    Past Family History:  Reviewed; refer to Hospitalist Admission Note    Review of Systems:  Review of Systems - All 14 systems reviewed and negative, except as noted in HPI    Physical Exam:    BP (!) 142/64   Pulse 72   Temp 98.5 °F (36.9 °C) (Oral)   Resp (!) 26   Ht 5' 6" (1.676 m)   Wt 48.1 kg (106 lb)   SpO2 98%   BMI 17.11 kg/m²     General Appearance:    Ill appearing.     Head:    Normocephalic, without obvious abnormality, atraumatic   Eyes:    PER, conjunctiva/corneas clear, EOM's intact in both eyes        Throat:   Lips, mucosa, and tongue normal; teeth and gums normal   Back:     Symmetric, no curvature, ROM normal, no CVA tenderness   Lungs:     Clear to auscultation bilaterally, respirations unlabored   Chest wall:    No tenderness or deformity   Heart:    Regular rate and rhythm, S1 and S2 normal, no murmur, rub   or gallop   Abdomen:     Soft, non-tender, bowel sounds active all four quadrants,     no masses, no organomegaly   Extremities:   Diminished breath sounds   Pulses:   2+ and symmetric all extremities   MSK:   No joint or muscle swelling, tenderness or deformity   Skin:   Skin color, texture, turgor normal, no rashes or lesions   Neurologic:   CNII-XII intact, normal strength and sensation       Throughout.  No flap     Results:  Lab Results   Component Value Date     12/04/2023 "    K 5.0 12/04/2023    CL 97 12/04/2023    CO2 25 12/04/2023    BUN 59 (H) 12/04/2023    CREATININE 10.6 (H) 12/04/2023    CALCIUM 8.0 (L) 12/04/2023    ANIONGAP 18 (H) 12/04/2023    ESTGFRAFRICA 4.7 (A) 02/08/2022    EGFRNONAA 4.0 (A) 02/08/2022       Lab Results   Component Value Date    CALCIUM 8.0 (L) 12/04/2023    PHOS 3.2 06/29/2023       Recent Labs   Lab 12/04/23  0704   WBC 6.49   RBC 3.30*   HGB 10.3*   HCT 33.0*      *   MCH 31.2*   MCHC 31.2*          I have personally reviewed pertinent radiological imaging and reports.    Patient care was time spent personally by me on the following activities:   Obtaining a history  Examination of patient.  Providing medical care at the patients bedside.  Developing a treatment plan with patient or surrogate and bedside caregivers  Ordering and reviewing laboratory studies, radiographic studies, pulse oximetry.  Ordering and performing treatments and interventions.  Evaluation of patient's response to treatment.  Discussions with consultants while on the unit and immediately available to the patient.  Re-evaluation of the patient's condition.  Documentation in the medical record.       Jimenez Valero MD  Nephrology  [facility]  [phone]

## 2023-12-05 PROBLEM — I47.10 SVT (SUPRAVENTRICULAR TACHYCARDIA): Status: RESOLVED | Noted: 2023-12-04 | Resolved: 2023-12-05

## 2023-12-05 LAB
ALBUMIN SERPL BCP-MCNC: 3.3 G/DL (ref 3.5–5.2)
ALP SERPL-CCNC: 59 U/L (ref 55–135)
ALT SERPL W/O P-5'-P-CCNC: 11 U/L (ref 10–44)
ANION GAP SERPL CALC-SCNC: 14 MMOL/L (ref 8–16)
AST SERPL-CCNC: 24 U/L (ref 10–40)
BASOPHILS # BLD AUTO: 0.02 K/UL (ref 0–0.2)
BASOPHILS NFR BLD: 0.4 % (ref 0–1.9)
BILIRUB SERPL-MCNC: 0.8 MG/DL (ref 0.1–1)
BUN SERPL-MCNC: 56 MG/DL (ref 8–23)
CALCIUM SERPL-MCNC: 8.1 MG/DL (ref 8.7–10.5)
CHLORIDE SERPL-SCNC: 100 MMOL/L (ref 95–110)
CO2 SERPL-SCNC: 26 MMOL/L (ref 23–29)
CREAT SERPL-MCNC: 9.2 MG/DL (ref 0.5–1.4)
CRP SERPL-MCNC: 312 MG/L (ref 0–8.2)
D DIMER PPP IA.FEU-MCNC: 0.9 MG/L FEU (ref 0–0.49)
DIFFERENTIAL METHOD BLD: ABNORMAL
EOSINOPHIL # BLD AUTO: 0 K/UL (ref 0–0.5)
EOSINOPHIL NFR BLD: 0.6 % (ref 0–8)
ERYTHROCYTE [DISTWIDTH] IN BLOOD BY AUTOMATED COUNT: 15.5 % (ref 11.5–14.5)
EST. GFR  (NO RACE VARIABLE): 3.9 ML/MIN/1.73 M^2
FERRITIN SERPL-MCNC: 3819.1 NG/ML (ref 20–300)
GLUCOSE SERPL-MCNC: 104 MG/DL (ref 70–110)
GLUCOSE SERPL-MCNC: 127 MG/DL (ref 70–110)
GLUCOSE SERPL-MCNC: 90 MG/DL (ref 70–110)
HCT VFR BLD AUTO: 30.8 % (ref 37–48.5)
HGB BLD-MCNC: 9.7 G/DL (ref 12–16)
IMM GRANULOCYTES # BLD AUTO: 0.01 K/UL (ref 0–0.04)
IMM GRANULOCYTES NFR BLD AUTO: 0.2 % (ref 0–0.5)
LDH SERPL L TO P-CCNC: 197 U/L (ref 110–260)
LYMPHOCYTES # BLD AUTO: 0.8 K/UL (ref 1–4.8)
LYMPHOCYTES NFR BLD: 15.6 % (ref 18–48)
MAGNESIUM SERPL-MCNC: 2 MG/DL (ref 1.6–2.6)
MCH RBC QN AUTO: 31 PG (ref 27–31)
MCHC RBC AUTO-ENTMCNC: 31.5 G/DL (ref 32–36)
MCV RBC AUTO: 98 FL (ref 82–98)
MONOCYTES # BLD AUTO: 1 K/UL (ref 0.3–1)
MONOCYTES NFR BLD: 18.5 % (ref 4–15)
NEUTROPHILS # BLD AUTO: 3.4 K/UL (ref 1.8–7.7)
NEUTROPHILS NFR BLD: 64.7 % (ref 38–73)
NRBC BLD-RTO: 0 /100 WBC
PHOSPHATE SERPL-MCNC: 5.9 MG/DL (ref 2.7–4.5)
PLATELET # BLD AUTO: 147 K/UL (ref 150–450)
PMV BLD AUTO: 10.8 FL (ref 9.2–12.9)
POTASSIUM SERPL-SCNC: 5.1 MMOL/L (ref 3.5–5.1)
PROT SERPL-MCNC: 6.7 G/DL (ref 6–8.4)
RBC # BLD AUTO: 3.13 M/UL (ref 4–5.4)
SODIUM SERPL-SCNC: 140 MMOL/L (ref 136–145)
TROPONIN I SERPL HS-MCNC: 66.4 PG/ML (ref 0–14.9)
TROPONIN I SERPL HS-MCNC: 66.4 PG/ML (ref 0–14.9)
WBC # BLD AUTO: 5.24 K/UL (ref 3.9–12.7)

## 2023-12-05 PROCEDURE — 82728 ASSAY OF FERRITIN: CPT | Performed by: STUDENT IN AN ORGANIZED HEALTH CARE EDUCATION/TRAINING PROGRAM

## 2023-12-05 PROCEDURE — 83615 LACTATE (LD) (LDH) ENZYME: CPT | Performed by: STUDENT IN AN ORGANIZED HEALTH CARE EDUCATION/TRAINING PROGRAM

## 2023-12-05 PROCEDURE — 36415 COLL VENOUS BLD VENIPUNCTURE: CPT | Performed by: STUDENT IN AN ORGANIZED HEALTH CARE EDUCATION/TRAINING PROGRAM

## 2023-12-05 PROCEDURE — 25000003 PHARM REV CODE 250: Performed by: STUDENT IN AN ORGANIZED HEALTH CARE EDUCATION/TRAINING PROGRAM

## 2023-12-05 PROCEDURE — 99223 1ST HOSP IP/OBS HIGH 75: CPT | Mod: ,,, | Performed by: STUDENT IN AN ORGANIZED HEALTH CARE EDUCATION/TRAINING PROGRAM

## 2023-12-05 PROCEDURE — 21000000 HC CCU ICU ROOM CHARGE

## 2023-12-05 PROCEDURE — 85379 FIBRIN DEGRADATION QUANT: CPT | Performed by: STUDENT IN AN ORGANIZED HEALTH CARE EDUCATION/TRAINING PROGRAM

## 2023-12-05 PROCEDURE — 86140 C-REACTIVE PROTEIN: CPT | Performed by: STUDENT IN AN ORGANIZED HEALTH CARE EDUCATION/TRAINING PROGRAM

## 2023-12-05 PROCEDURE — 84484 ASSAY OF TROPONIN QUANT: CPT | Performed by: STUDENT IN AN ORGANIZED HEALTH CARE EDUCATION/TRAINING PROGRAM

## 2023-12-05 PROCEDURE — 63600175 PHARM REV CODE 636 W HCPCS: Performed by: STUDENT IN AN ORGANIZED HEALTH CARE EDUCATION/TRAINING PROGRAM

## 2023-12-05 PROCEDURE — 85025 COMPLETE CBC W/AUTO DIFF WBC: CPT | Performed by: STUDENT IN AN ORGANIZED HEALTH CARE EDUCATION/TRAINING PROGRAM

## 2023-12-05 PROCEDURE — 80053 COMPREHEN METABOLIC PANEL: CPT | Performed by: STUDENT IN AN ORGANIZED HEALTH CARE EDUCATION/TRAINING PROGRAM

## 2023-12-05 PROCEDURE — 94761 N-INVAS EAR/PLS OXIMETRY MLT: CPT

## 2023-12-05 PROCEDURE — 84100 ASSAY OF PHOSPHORUS: CPT | Performed by: STUDENT IN AN ORGANIZED HEALTH CARE EDUCATION/TRAINING PROGRAM

## 2023-12-05 PROCEDURE — 90935 HEMODIALYSIS ONE EVALUATION: CPT

## 2023-12-05 PROCEDURE — 83735 ASSAY OF MAGNESIUM: CPT | Performed by: STUDENT IN AN ORGANIZED HEALTH CARE EDUCATION/TRAINING PROGRAM

## 2023-12-05 PROCEDURE — 80100014 HC HEMODIALYSIS 1:1

## 2023-12-05 RX ORDER — SOTALOL HYDROCHLORIDE 80 MG/1
80 TABLET ORAL 2 TIMES DAILY
Status: DISCONTINUED | OUTPATIENT
Start: 2023-12-05 | End: 2023-12-05

## 2023-12-05 RX ORDER — SOTALOL HYDROCHLORIDE 80 MG/1
80 TABLET ORAL 2 TIMES DAILY
Status: DISCONTINUED | OUTPATIENT
Start: 2023-12-06 | End: 2023-12-10

## 2023-12-05 RX ORDER — LIDOCAINE AND PRILOCAINE 25; 25 MG/G; MG/G
CREAM TOPICAL
Status: DISCONTINUED | OUTPATIENT
Start: 2023-12-05 | End: 2023-12-15 | Stop reason: HOSPADM

## 2023-12-05 RX ORDER — HEPARIN SODIUM 5000 [USP'U]/ML
5000 INJECTION, SOLUTION INTRAVENOUS; SUBCUTANEOUS
Status: CANCELLED | OUTPATIENT
Start: 2023-12-05

## 2023-12-05 RX ORDER — MUPIROCIN 20 MG/G
OINTMENT TOPICAL 2 TIMES DAILY
Status: COMPLETED | OUTPATIENT
Start: 2023-12-05 | End: 2023-12-10

## 2023-12-05 RX ORDER — SODIUM CHLORIDE 9 MG/ML
INJECTION, SOLUTION INTRAVENOUS
Status: CANCELLED | OUTPATIENT
Start: 2023-12-05

## 2023-12-05 RX ORDER — SODIUM CHLORIDE 9 MG/ML
INJECTION, SOLUTION INTRAVENOUS ONCE
Status: CANCELLED | OUTPATIENT
Start: 2023-12-05 | End: 2023-12-05

## 2023-12-05 RX ORDER — DILTIAZEM HYDROCHLORIDE 60 MG/1
120 TABLET, FILM COATED ORAL EVERY 12 HOURS
Status: DISCONTINUED | OUTPATIENT
Start: 2023-12-05 | End: 2023-12-05

## 2023-12-05 RX ADMIN — Medication 6 MG: at 10:12

## 2023-12-05 RX ADMIN — ATORVASTATIN CALCIUM 40 MG: 40 TABLET, FILM COATED ORAL at 08:12

## 2023-12-05 RX ADMIN — APIXABAN 2.5 MG: 2.5 TABLET, FILM COATED ORAL at 08:12

## 2023-12-05 RX ADMIN — ONDANSETRON 4 MG: 2 INJECTION INTRAMUSCULAR; INTRAVENOUS at 07:12

## 2023-12-05 RX ADMIN — DORZOLAMIDE HYDROCHLORIDE AND TIMOLOL MALEATE 1 DROP: 20; 5 SOLUTION/ DROPS OPHTHALMIC at 10:12

## 2023-12-05 RX ADMIN — MUPIROCIN 1 G: 20 OINTMENT TOPICAL at 10:12

## 2023-12-05 RX ADMIN — APIXABAN 2.5 MG: 2.5 TABLET, FILM COATED ORAL at 10:12

## 2023-12-05 RX ADMIN — LATANOPROST 1 DROP: 50 SOLUTION OPHTHALMIC at 10:12

## 2023-12-05 RX ADMIN — CALCIUM ACETATE 667 MG: 667 CAPSULE ORAL at 08:12

## 2023-12-05 NOTE — CONSULTS
INPATIENT NEPHROLOGY CONSULT   [facility] NEPHROLOGY    Tyra Isaac  12/05/2023    Reason for consultation:    ESRD    Chief Complaint:   Chief Complaint   Patient presents with    Vomiting    Cough    Fatigue     Started with weakness 2 days ago. Dialysis mwf, started vomiting this am.            History of Present Illness:    Per ER  83-year-old female with history of end-stage renal disease with dialysis on Monday Wednesday Friday followed by Dr. Maynard with right upper extremity AV fistula, last dialysis on Friday, atrial fibrillation on Eliquis, hypertension, hyperlipidemia, anxiety, gout, CVA, COPD.  Patient with recent diagnosis of pneumonia 2 weeks ago, completed antibiotics.  She presents emergency department with complaint of persistent cough, shortness of breath, generalized weakness, nausea, vomiting over last 2 days.  Patient states that she has generalized pain all over.  Was to go to dialysis today at 6:00 a.m. however came to the emergency department due to generalized weakness, cough, fatigue.  Her son who is her primary caretaker states that he feels his mother has too many medications to take this is also causing her to feel bad.     12/5  dialysis stopped yesterday due to SVT.  Back in SR.   Mild sob.  Generalized achiness.  No angina.      Plan of Care:       Assessment:    esrd  --continue dialysis per routine  --fluid restrict  --renal dose medication per routine  --continue outpt medication  --continue binders with meals  --2 hours hd today due to not getting HD yesterday.  Please keep in stepdown to monitor telemetry while on HD    Anemia  --erythropoiesis stimulating agent with renal replacement therapy    Hyperphosphatemia  --renal diet  --continue binders    Hypertension  --uf with hd  --fluid restrict  --low salt diet  --continue home medication    Pulmonary edema  --uf as tolerated  --fluid restrict          Thank you for allowing us to participate in this patient's care. We will  continue to follow.    Vital Signs:  Temp Readings from Last 3 Encounters:   12/05/23 98.6 °F (37 °C) (Axillary)   11/14/23 98.2 °F (36.8 °C) (Oral)   07/22/23 97.9 °F (36.6 °C) (Oral)       Pulse Readings from Last 3 Encounters:   12/05/23 (!) 52   11/29/23 (!) 50   11/14/23 68       BP Readings from Last 3 Encounters:   12/05/23 137/63   11/29/23 (!) 130/54   11/14/23 121/60       Weight:  Wt Readings from Last 3 Encounters:   12/04/23 50.6 kg (111 lb 8.8 oz)   11/29/23 48.5 kg (107 lb)   11/09/23 52.2 kg (115 lb 1.3 oz)       Past Medical & Surgical History:  Past Medical History:   Diagnosis Date    A-fib     Anxiety     Depression     Disorder of kidney and ureter     Encephalopathy acute 1/1/2018    End stage kidney disease 6/17/2017    Gout     Hyperlipidemia     Hypertension     Moderate episode of recurrent major depressive disorder 1/17/2018    Nephropathy hypertensive, 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    Osteopenia of multiple sites 3/9/2018    Based upon bone density measurements. Patient also has chronic kidney disease.    Stroke 11/2016       Past Surgical History:   Procedure Laterality Date    ANGIOGRAM, CORONARY, WITH LEFT HEART CATHETERIZATION N/A 2/7/2022    Procedure: Angiogram, Coronary, with Left Heart Cath;  Surgeon: Gino Leal MD;  Location: [facility] CATH/EP LAB;  Service: Cardiology;  Laterality: N/A;    CARDIAC SURGERY      stents    EYE SURGERY      WRIST SURGERY         Past Social History:  Social History     Socioeconomic History    Marital status:      Spouse name: Aria Isaac    Number of children: 3   Occupational History    Occupation: Not working   Tobacco Use    Smoking status: Never    Smokeless tobacco: Never   Substance and Sexual Activity    Alcohol use: No    Drug use: No    Sexual activity: Not Currently     Social  Determinants of Health     Financial Resource Strain: Medium Risk (6/15/2022)    Overall Financial Resource Strain (CARDIA)     Difficulty of Paying Living Expenses: Somewhat hard   Food Insecurity: No Food Insecurity (6/15/2022)    Hunger Vital Sign     Worried About Running Out of Food in the Last Year: Never true     Ran Out of Food in the Last Year: Never true   Transportation Needs: Unmet Transportation Needs (3/29/2023)    PRAPARE - Transportation     Lack of Transportation (Medical): Yes     Lack of Transportation (Non-Medical): No   Physical Activity: Inactive (6/15/2022)    Exercise Vital Sign     Days of Exercise per Week: 0 days     Minutes of Exercise per Session: 0 min   Stress: Stress Concern Present (6/15/2022)    [employer] [place] [employer] - Occupational Stress Questionnaire     Feeling of Stress : To some extent   Social Connections: Moderately Isolated (6/15/2022)    Social Connection and Isolation Panel [NHANES]     Frequency of Communication with Friends and Family: Three times a week     Frequency of Social Gatherings with Friends and Family: Three times a week     Attends [employer] Services: 1 to 4 times per year     Active Member of Clubs or Organizations: No     Attends Club or Organization Meetings: Never     Marital Status:    Housing Stability: Low Risk  (6/15/2022)    Housing Stability Vital Sign     Unable to Pay for Housing in the Last Year: No     Number of Places Lived in the Last Year: 1     Unstable Housing in the Last Year: No       Medications:  No current facility-administered medications on file prior to encounter.     Current Outpatient Medications on File Prior to Encounter   Medication Sig Dispense Refill    atorvastatin (LIPITOR) 40 MG tablet Take 40 mg by mouth once daily.      b complex vitamins tablet Take 1 tablet by mouth once daily.      calcium acetate,phosphat bind, (PHOSLO) 667 mg tablet Take 667 mg by mouth 2 (two) times daily with meals.       "diltiaZEM (CARDIZEM CD) 120 MG Cp24 Take 1 capsule (120 mg total) by mouth 2 (two) times a day.      dorzolamide-timolol 2-0.5% (COSOPT) 22.3-6.8 mg/mL ophthalmic solution Place 1 drop into both eyes 2 (two) times daily.      ELIQUIS 2.5 mg Tab Take 2.5 mg by mouth 2 (two) times daily.      latanoprost 0.005 % ophthalmic solution Place 1 drop into both eyes every evening.      loperamide (IMODIUM A-D) 2 mg Tab Take 2 mg by mouth 4 (four) times daily as needed.      ondansetron (ZOFRAN) 4 MG tablet Take 1 tablet (4 mg total) by mouth every 8 (eight) hours as needed for Nausea. 15 tablet 0    sotaloL (BETAPACE) 80 MG tablet Take 1 tablet (80 mg total) by mouth 2 (two) times daily. 60 tablet 0     Scheduled Meds:  Continuous Infusions:  PRN Meds:.    Allergies:  Cyclobenzaprine, Fish containing products, Peanut, and Tramadol    Past Family History:  Reviewed; refer to Hospitalist Admission Note    Review of Systems:  Review of Systems - All 14 systems reviewed and negative, except as noted in HPI    Physical Exam:    /63   Pulse (!) 52   Temp 98.6 °F (37 °C) (Axillary)   Resp 20   Ht 5' 6" (1.676 m)   Wt 50.6 kg (111 lb 8.8 oz)   SpO2 97%   BMI 18.01 kg/m²     General Appearance:    Ill appearing.     Head:    Normocephalic, without obvious abnormality, atraumatic   Eyes:    PER, conjunctiva/corneas clear, EOM's intact in both eyes        Throat:   Lips, mucosa, and tongue normal; teeth and gums normal   Back:     Symmetric, no curvature, ROM normal, no CVA tenderness   Lungs:     Clear to auscultation bilaterally, respirations unlabored   Chest wall:    No tenderness or deformity   Heart:    Regular rate and rhythm, S1 and S2 normal, no murmur, rub   or gallop   Abdomen:     Soft, non-tender, bowel sounds active all four quadrants,     no masses, no organomegaly   Extremities:   Diminished breath sounds   Pulses:   2+ and symmetric all extremities   MSK:   No joint or muscle swelling, tenderness or " deformity   Skin:   Skin color, texture, turgor normal, no rashes or lesions   Neurologic:   CNII-XII intact, normal strength and sensation       Throughout.  No flap     Results:  Lab Results   Component Value Date     12/05/2023    K 5.1 12/05/2023     12/05/2023    CO2 26 12/05/2023    BUN 56 (H) 12/05/2023    CREATININE 9.2 (H) 12/05/2023    CALCIUM 8.1 (L) 12/05/2023    ANIONGAP 14 12/05/2023    ESTGFRAFRICA 4.7 (A) 02/08/2022    EGFRNONAA 4.0 (A) 02/08/2022       Lab Results   Component Value Date    CALCIUM 8.1 (L) 12/05/2023    PHOS 5.9 (H) 12/05/2023       Recent Labs   Lab 12/05/23  0437   WBC 5.24   RBC 3.13*   HGB 9.7*   HCT 30.8*   *   MCV 98   MCH 31.0   MCHC 31.5*            I have personally reviewed pertinent radiological imaging and reports.    Patient care was time spent personally by me on the following activities:   Obtaining a history  Examination of patient.  Providing medical care at the patients bedside.  Developing a treatment plan with patient or surrogate and bedside caregivers  Ordering and reviewing laboratory studies, radiographic studies, pulse oximetry.  Ordering and performing treatments and interventions.  Evaluation of patient's response to treatment.  Discussions with consultants while on the unit and immediately available to the patient.  Re-evaluation of the patient's condition.  Documentation in the medical record.       Jimenez Valero MD  Nephrology  [facility] [place]  [phone]

## 2023-12-05 NOTE — PROGRESS NOTES
"Replaced by [facility]  Adult Nutrition   Progress Note (Initial Assessment)     SUMMARY     Recommendations  1.) Continue renal diet.   2.) Will add Suplena with meals (to provide 450 kcals, 11gm protein per carton).   3.) Suggest adjusting phosphate binder to Renvela 800mg tablets (Corrected Ca 8.7).   4.) RD to monitor and provide recommendatins PRN.    Goals:   1.) Pt to consume/tolerate >75% of meals and ONS.   2.) Phosphate levels to stabilize and return to standard reference range.   3.) Pt to gradually gain +1-2lbs/week to healthy BMI range.  Nutrition Goal Status: new    Dietitian Rounds Brief  Pt presents emergency department with complaint of persistent cough, shortness of breath, generalized weakness, nausea, vomiting over last 2 days. Noted +COVID. Pt with fair intake of meals per chart review. Pt agreed to Nepro with meals. No GI distress. LBM 12/3. Skin: intact per chart. Wt reviewed. UBW 56kg (2022), CBW 50.6kg reflecting 10% wt loss x 1 year, not significant. NFPE not completed d/t COVID19 to prevent exposure and spread of disease. Pt at risk for malnutrition given varied intakes and possible muscle/fat loss.    Diet order:   Current Diet Order: renal       Evaluation of Received Nutrient/Fluid Intake  Energy Calories Required: not meeting needs  Protein Required: not meeting needs  Fluid Required: meeting needs  Tolerance: tolerating     % Intake of Estimated Energy Needs: 25 - 50 %  % Meal Intake: 25 - 50 %      Intake/Output Summary (Last 24 hours) at 12/5/2023 1651  Last data filed at 12/5/2023 1300  Gross per 24 hour   Intake 566.42 ml   Output 0 ml   Net 566.42 ml        Anthropometrics  Temp: 98.7 °F (37.1 °C)  Height Method: Stated  Height: 5' 6" (167.6 cm)  Height (inches): 66 in  Weight Method: Bed Scale  Weight: 50.6 kg (111 lb 8.8 oz)  Weight (lb): 111.55 lb  Ideal Body Weight (IBW), Female: 130 lb  % Ideal Body Weight, Female (lb): 85.81 %  BMI (Calculated): 18  BMI Grade: 17 - 18.4 " protein-energy malnutrition grade I  Usual Body Weight (UBW), k kg (12/3)  % Usual Body Weight: 90.55       Estimated/Assessed Needs  Weight Used For Calorie Calculations: 50.6 kg (111 lb 8.8 oz)  Energy Calorie Requirements (kcal): 5041-9013  Energy Need Method: Kcal/kg (30-35)  Protein Requirements: 61-66 (1.2-1.3)  Weight Used For Protein Calculations: 50.6 kg (111 lb 8.8 oz)  Fluid Requirements (mL): urine output + 1000mL     RDA Method (mL): 1518       Reason for Assessment  Reason For Assessment: low BMI  Diagnosis: cardiac disease  Relevant Medical History: end-stage renal disease with dialysis on  followed by Dr. Maynard with right upper extremity AV fistula, last dialysis on Friday, atrial fibrillation on Eliquis, hypertension, hyperlipidemia, anxiety, gout, CVA, COPD    Nutrition/Diet History  Food Allergies: NKFA  Factors Affecting Nutritional Intake: decreased appetite    Nutrition Risk Screen  Nutrition Risk Screen: no indicators present     MST Score: 0  Have you recently lost weight without trying?: No  Weight loss score: 0  Have you been eating poorly because of a decreased appetite?: No  Appetite score: 0       Weight History:  Wt Readings from Last 5 Encounters:   23 50.6 kg (111 lb 8.8 oz)   23 48.5 kg (107 lb)   23 52.2 kg (115 lb 1.3 oz)   23 52.2 kg (115 lb)   23 51.5 kg (113 lb 9.6 oz)        Lab/Procedures/Meds: Pertinent Labs/Meds Reviewed    Medications:Pertinent Medications Reviewed  Scheduled Meds:   apixaban  2.5 mg Oral BID    atorvastatin  40 mg Oral Daily    calcium acetate(phosphat bind)  667 mg Oral Daily    dorzolamide-timolol 2-0.5%  1 drop Both Eyes BID    epoetin rosy-epbx  50 Units/kg Intravenous Every Mon, Wed, Fri    latanoprost  1 drop Both Eyes QHS    mupirocin   Nasal BID    [START ON 2023] sotaloL  80 mg Oral BID     Continuous Infusions:  PRN Meds:.sodium chloride 0.9%, acetaminophen, adenosine, heparin  (porcine), melatonin, naloxone, ondansetron, sodium chloride 0.9%, sodium chloride 0.9%    Labs: Pertinent Labs Reviewed  Clinical Chemistry:  Recent Labs   Lab 12/04/23  0704 12/05/23 0437    140   K 5.0 5.1   CL 97 100   CO2 25 26   * 90   BUN 59* 56*   CREATININE 10.6* 9.2*   CALCIUM 8.0* 8.1*   PROT 7.4 6.7   ALBUMIN 3.7 3.3*   BILITOT 0.8 0.8   ALKPHOS 46* 59   AST 27 24   ALT 9* 11   ANIONGAP 18* 14   MG 2.0 2.0   PHOS  --  5.9*   LIPASE 23  --      CBC:   Recent Labs   Lab 12/05/23 0437   WBC 5.24   RBC 3.13*   HGB 9.7*   HCT 30.8*   *   MCV 98   MCH 31.0   MCHC 31.5*     Cardiac Profile:  Recent Labs   Lab 12/04/23 0704   BNP 2,609*     Inflammatory Labs:  Recent Labs   Lab 12/05/23 0437   .0*       Monitor and Evaluation  Food and Nutrient Intake: energy intake, food and beverage intake  Food and Nutrient Adminstration: diet order  Knowledge/Beliefs/Attitudes: food and nutrition knowledge/skill  Physical Activity and Function: nutrition-related ADLs and IADLs  Anthropometric Measurements: weight, weight change  Biochemical Data, Medical Tests and Procedures: electrolyte and renal panel, gastrointestinal profile, glucose/endocrine profile, other (specify) (renal profile)  Nutrition-Focused Physical Findings: overall appearance     Nutrition Risk  Level of Risk/Frequency of Follow-up: moderate     Nutrition Follow-Up  RD Follow-up?: Yes      Tamara Dockery RD 12/05/2023 4:51 PM

## 2023-12-05 NOTE — PLAN OF CARE
Problem: Skin Injury Risk Increased  Goal: Skin Health and Integrity  Intervention: Promote and Optimize Oral Intake  Flowsheets (Taken 12/5/2023 1652)  Oral Nutrition Promotion:   calorie-dense foods provided   calorie-dense liquids provided   other (see comments)     Problem: Oral Intake Inadequate  Goal: Improved Oral Intake  Intervention: Promote and Optimize Oral Intake  Flowsheets (Taken 12/5/2023 1652)  Oral Nutrition Promotion:   calorie-dense foods provided   calorie-dense liquids provided   other (see comments)     Recommendations  1.) Continue renal diet.   2.) Will add Suplena with meals (to provide 450 kcals, 11gm protein per carton).   3.) Suggest adjusting phosphate binder to Renvela 800mg tablets (Corrected Ca 8.7).   4.) RD to monitor and provide recommendatins PRN.     Goals:   1.) Pt to consume/tolerate >75% of meals and ONS.   2.) Phosphate levels to stabilize and return to standard reference range.   3.) Pt to gradually gain +1-2lbs/week to healthy BMI range.  Nutrition Goal Status: new

## 2023-12-05 NOTE — CARE UPDATE
12/04/23 2010   Patient Assessment/Suction   Level of Consciousness (AVPU) alert   Respiratory Effort Normal   PRE-TX-O2   Device (Oxygen Therapy) nasal cannula   $ Is the patient on Low Flow Oxygen? Yes   Flow (L/min) 2   SpO2 99 %   Pulse Oximetry Type Continuous   $ Pulse Oximetry - Multiple Charge Pulse Oximetry - Multiple   Pulse 87   Resp (!) 31   Education   $ Education 15 min   Respiratory Evaluation   $ Care Plan Tech Time 15 min

## 2023-12-05 NOTE — NURSING
..Nurses Note -- 4 Eyes      12/4/2023   7:00 PM      Skin assessed during: Transfer      [x] No Altered Skin Integrity Present    []Prevention Measures Documented      [] Yes- Altered Skin Integrity Present or Discovered   [] LDA Added if Not in Epic (Describe Wound)   [] New Altered Skin Integrity was Present on Admit and Documented in LDA   [] Wound Image Taken    Wound Care Consulted? No    Attending Nurse: Karin Jaimes RN    Second RN/Staff Member:   Severiano Loya RN

## 2023-12-05 NOTE — PLAN OF CARE
[facility]  Initial Discharge Assessment       Primary Care Provider: Kalpesh Goel MD    Admission Diagnosis: COVID-19 [U07.1]  SVT (supraventricular tachycardia) [I47.10]  End stage renal disease [N18.6]    Admission Date: 12/4/2023  Expected Discharge Date: 12/7/2023     completed discharge assessment with Pt's son via telephone. Pt lives at home alone. Son  comes to take care of Pt on MWF. Demographics, PCP, and insurance verified. Pt has [facility]. Pt does dialysis at [facility]. Pt completes ADLs with the assistance of a rollator. Pt to discharge home with resumption of home health via family transport. Pt has no other needs to be addressed at this time.    Transition of Care Barriers: None    Payor: [employer] MEDICARE / Plan: [employer] MEDICARE HMO / Product Type: Capitation /     Extended Emergency Contact Information  Primary Emergency Contact: Pantera Isaacvin  Address: [address] [place] [address] [id number] [place] [address]  Home Phone: [phone]  Mobile Phone: [phone]  Relation: Son   needed? No    Discharge Plan A: Home Health  Discharge Plan B: Home Health      [facility] [address] [place] [address] [address] [id number]  Phone: [phone] Fax: [phone]      Initial Assessment (most recent)       Adult Discharge Assessment - 12/05/23 1106          Discharge Assessment    Assessment Type Discharge Planning Assessment     Confirmed/corrected address, phone number and insurance Yes     Confirmed Demographics Correct on Facesheet     Source of Information family     If unable to respond/provide information was family/caregiver contacted? Yes     Contact Name/Number Raffi Isaac (Son) [phone]     Communicated ILAN with patient/caregiver Date not available/Unable to determine     Reason For Admission SVT (supraventricular tachycardia)     People in Home alone     Facility Arrived  From: home     Do you expect to return to your current living situation? Yes     Do you have help at home or someone to help you manage your care at home? Yes     Who are your caregiver(s) and their phone number(s)? Raffi Isaac (Son) [phone]     Prior to hospitilization cognitive status: Unable to Assess     Current cognitive status: Unable to Assess     Walking or Climbing Stairs ambulation difficulty, requires equipment     Mobility Management Rollator     Home Accessibility wheelchair accessible     Home Layout Able to live on 1st floor     Equipment Currently Used at Home rollator     Readmission within 30 days? Yes     Patient currently being followed by outpatient case management? No     Do you currently have service(s) that help you manage your care at home? Yes     Name and Contact number of agency [facility]     Is the pt/caregiver preference to resume services with current agency Yes     Do you take prescription medications? Yes     Do you have prescription coverage? Yes     Coverage [employer] [facility]     Do you have any problems affording any of your prescribed medications? No     Is the patient taking medications as prescribed? yes     Who is going to help you get home at discharge? Raffi Isaac (Son) [phone]     How do you get to doctors appointments? family or friend will provide     Are you on dialysis? Yes     Dialysis Name and Scheduled days [facility] MWF     Do you take coumadin? No     DME Needed Upon Discharge  none     Discharge Plan discussed with: Adult children     Transition of Care Barriers None     Discharge Plan A Home Health     Discharge Plan B Home Health

## 2023-12-05 NOTE — ASSESSMENT & PLAN NOTE
-Continue home diltiazem; currently held for bradycardia  -HD per Nephrology  -Renally dose medications

## 2023-12-05 NOTE — CONSULTS
"[facility]  Department of Cardiology  Consult Note      PATIENT NAME: Tyra Isaac  MRN: [id number]  TODAY'S DATE: 12/05/2023  ADMIT DATE: 12/4/2023                          CONSULT REQUESTED BY: Juanito Ahn MD    SUBJECTIVE     PRINCIPAL PROBLEM: SVT (supraventricular tachycardia)      REASON FOR CONSULT:  SVT      HPI: Pt is 82 yo female admitted for covid 19 as below. Cardiology consulted for SVT vs AF RVR during dialysis yesterday evening around 7 pm.    Pt is resting in bed. Denies chest pain or dyspnea on room air. Reports nausea and vomiting and generalized malaise    Hospitalist HPI: "Tyra Isaac is an 83 year old female with a past medical history of ESRD (MWF), Afib, HTN, anemia, and CAD who presented with worsening fatigue, myalgias, constipation, and cough. She was supposed to go to HD today, but instead she presented to the ED. She was discovered to have COVID-19 on workup with no evidence of pneumonia on CXR. Nephrology was consulted from the ED HD. Hospital Medicine was consulted for admission."     Notes from 12/4/23: 3:58 pm-   Patient was undergoing dialysis at the bedside when her heart rate began to increase on telemetry. EKG showed SVT. She received 6 mg followed by 12 mg of adenosine without improvement in her heart rate. Cardiology was consulted and recommended a diltiazem bolus and infusion. The patient will be transferred to the stepdown unit.     Nurse reported pt converted to sinus zane around 11pm last night. She has been sinus zane since conversion.    Review of patient's allergies indicates:   Allergen Reactions    Cyclobenzaprine     Fish containing products Hives    Peanut Other (See Comments)    Tramadol Itching       Past Medical History:   Diagnosis Date    A-fib     Anxiety     Depression     Disorder of kidney and ureter     Encephalopathy acute 1/1/2018    End stage kidney disease 6/17/2017    Gout     Hyperlipidemia     Hypertension     Moderate " episode of recurrent major depressive disorder 1/17/2018    Nephropathy hypertensive, 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    Osteopenia of multiple sites 3/9/2018    Based upon bone density measurements. Patient also has chronic kidney disease.    Stroke 11/2016     Past Surgical History:   Procedure Laterality Date    ANGIOGRAM, CORONARY, WITH LEFT HEART CATHETERIZATION N/A 2/7/2022    Procedure: Angiogram, Coronary, with Left Heart Cath;  Surgeon: Gino Leal MD;  Location: [facility] CATH/EP LAB;  Service: Cardiology;  Laterality: N/A;    CARDIAC SURGERY      stents    EYE SURGERY      WRIST SURGERY       Social History     Tobacco Use    Smoking status: Never    Smokeless tobacco: Never   Substance Use Topics    Alcohol use: No    Drug use: No        REVIEW OF SYSTEMS  Negative except as mentioned in HPI    OBJECTIVE     VITAL SIGNS (Most Recent)  Temp: 98.6 °F (37 °C) (12/05/23 0701)  Pulse: (!) 50 (12/05/23 0645)  Resp: (!) 25 (12/05/23 0645)  BP: (!) 145/65 (12/05/23 0645)  SpO2: 95 % (12/05/23 0645)    VENTILATION STATUS  Resp: (!) 25 (12/05/23 0645)  SpO2: 95 % (12/05/23 0645)           I & O (Last 24H):  Intake/Output Summary (Last 24 hours) at 12/5/2023 1022  Last data filed at 12/5/2023 0850  Gross per 24 hour   Intake 946.42 ml   Output 723 ml   Net 223.42 ml       WEIGHTS  Wt Readings from Last 3 Encounters:   12/04/23 2220 50.6 kg (111 lb 8.8 oz)   12/04/23 0636 48.1 kg (106 lb)   11/29/23 1040 48.5 kg (107 lb)   11/09/23 2243 52.2 kg (115 lb 1.3 oz)   11/09/23 1651 52.2 kg (115 lb)       PHYSICAL EXAM    GENERAL: fatigued elderly female resting in bed in no apparent distress.  HEENT: Normocephalic.    NECK: No JVD.   CARDIAC: Regular bradycardic rate and rhythm. S1 is normal.S2 is normal.No gallops, clicks or murmurs noted at this time.  CHEST ANATOMY: normal.   LUNGS:  Clear to auscultation. No wheezing or rhonchi..   ABDOMEN: Soft, flat, thin, non tender. Normal bowel sounds.    EXTREMITIES: No edema  CENTRAL NERVOUS SYSTEM: AAO x 3  SKIN: No rash     HOME MEDICATIONS:  No current facility-administered medications on file prior to encounter.     Current Outpatient Medications on File Prior to Encounter   Medication Sig Dispense Refill    atorvastatin (LIPITOR) 40 MG tablet Take 40 mg by mouth once daily.      b complex vitamins tablet Take 1 tablet by mouth once daily.      calcium acetate,phosphat bind, (PHOSLO) 667 mg tablet Take 667 mg by mouth 2 (two) times daily with meals.      diltiaZEM (CARDIZEM CD) 120 MG Cp24 Take 1 capsule (120 mg total) by mouth 2 (two) times a day.      dorzolamide-timolol 2-0.5% (COSOPT) 22.3-6.8 mg/mL ophthalmic solution Place 1 drop into both eyes 2 (two) times daily.      ELIQUIS 2.5 mg Tab Take 2.5 mg by mouth 2 (two) times daily.      latanoprost 0.005 % ophthalmic solution Place 1 drop into both eyes every evening.      loperamide (IMODIUM A-D) 2 mg Tab Take 2 mg by mouth 4 (four) times daily as needed.      ondansetron (ZOFRAN) 4 MG tablet Take 1 tablet (4 mg total) by mouth every 8 (eight) hours as needed for Nausea. 15 tablet 0    sotaloL (BETAPACE) 80 MG tablet Take 1 tablet (80 mg total) by mouth 2 (two) times daily. 60 tablet 0       SCHEDULED MEDS:   apixaban  2.5 mg Oral BID    atorvastatin  40 mg Oral Daily    calcium acetate(phosphat bind)  667 mg Oral Daily    diltiaZEM  120 mg Oral Q12H    dorzolamide-timolol 2-0.5%  1 drop Both Eyes BID    epoetin rosy-epbx  50 Units/kg Intravenous Every Mon, Wed, Fri    latanoprost  1 drop Both Eyes QHS       CONTINUOUS INFUSIONS:    PRN MEDS:sodium chloride 0.9%, acetaminophen, adenosine, heparin (porcine), melatonin, naloxone, ondansetron, sodium chloride 0.9%, sodium chloride 0.9%    LABS AND DIAGNOSTICS     CBC LAST 3 DAYS  Recent Labs   Lab 12/04/23  0704 12/05/23  0437   WBC 6.49 5.24   RBC  "3.30* 3.13*   HGB 10.3* 9.7*   HCT 33.0* 30.8*   * 98   MCH 31.2* 31.0   MCHC 31.2* 31.5*   RDW 15.6* 15.5*    147*   MPV 10.8 10.8   GRAN 68.1  4.4 64.7  3.4   LYMPH 13.7*  0.9* 15.6*  0.8*   MONO 17.1*  1.1* 18.5*  1.0   BASO 0.02 0.02   NRBC 0 0       COAGULATION LAST 3 DAYS  No results for input(s): "LABPT", "INR", "APTT" in the last 168 hours.    CHEMISTRY LAST 3 DAYS  Recent Labs   Lab 12/04/23  0704 12/05/23 0437    140   K 5.0 5.1   CL 97 100   CO2 25 26   ANIONGAP 18* 14   BUN 59* 56*   CREATININE 10.6* 9.2*   * 90   CALCIUM 8.0* 8.1*   MG 2.0 2.0   ALBUMIN 3.7 3.3*   PROT 7.4 6.7   ALKPHOS 46* 59   ALT 9* 11   AST 27 24   BILITOT 0.8 0.8       CARDIAC PROFILE LAST 3 DAYS  Recent Labs   Lab 12/04/23  0704 12/05/23 0437   BNP 2,609*  --    LDH  --  197       ENDOCRINE LAST 3 DAYS  No results for input(s): "TSH", "PROCAL" in the last 168 hours.    LAST ARTERIAL BLOOD GAS  ABG  No results for input(s): "PH", "PO2", "PCO2", "HCO3", "BE" in the last 168 hours.    LAST 7 DAYS MICROBIOLOGY   Microbiology Results (last 7 days)       Procedure Component Value Units Date/Time    Blood culture [[id number]] Collected: 12/04/23 0709    Order Status: Completed Specimen: Blood Updated: 12/05/23 0832     Blood Culture, Routine No Growth to date      No Growth to date    Narrative:      Collection has been rescheduled by MARCELA at 12/04/2023 07:14 Reason:   Done per phil  Collection has been rescheduled by [facility] at 12/04/2023 07:14 Reason:   Done per phil    Blood culture [[id number]] Collected: 12/04/23 0713    Order Status: Completed Specimen: Blood Updated: 12/05/23 0832     Blood Culture, Routine No Growth to date      No Growth to date    Narrative:      Collection has been rescheduled by MARCELA at 12/04/2023 07:14 Reason:   Done per phil  [facility] 12/04/2023 07:14 Reason:   Done per phil    Respiratory Infection Panel (PCR), Nasopharyngeal " [[id number]]  (Abnormal) Collected: 12/04/23 0645    Order Status: Completed Updated: 12/04/23 0947     Respiratory Infection Panel Source NP swab     Adenovirus Not Detected     Coronavirus 229E, Common Cold Virus Not Detected     Coronavirus HKU1, Common Cold Virus Not Detected     Coronavirus NL63, Common Cold Virus Not Detected     Coronavirus OC43, Common Cold Virus Not Detected     Comment: Coronavirus strains 229E, HKU1, NL63, and OC43 can cause the common   cold   and are not associated with the respiratory disease outbreak caused   by  the COVID-19 (SARS-CoV-2 novel Coronavirus) strain.           SARS-CoV2 (COVID-19) Qualitative PCR Detected     Human Metapneumovirus Not Detected     Human Rhinovirus/Enterovirus Not Detected     Influenza A (subtypes H1, H1-2009,H3) Not Detected     Influenza B Not Detected     Parainfluenza Virus 1 Not Detected     Parainfluenza Virus 2 Not Detected     Parainfluenza Virus 3 Not Detected     Parainfluenza Virus 4 Not Detected     Respiratory Syncytial Virus Not Detected     Bordetella Parapertussis (HV2060) Not Detected     Bordetella pertussis (ptxP) Not Detected     Chlamydia pneumoniae Not Detected     Mycoplasma pneumoniae Not Detected     Comment: Respiratory Infection Panel testing performed by Multiplex PCR.       Narrative:      Specimen Source->Nasopharyngeal Swab    Resp Viral Panel PCR, Peds Under 7 Months Nasopharyngeal Swab [[id number]] Collected: 12/04/23 0645    Order Status: Canceled             MOST RECENT IMAGING  X-Ray Chest AP Portable  XR CHEST 1 VIEW    CLINICAL HISTORY:  83 years Female Chest Pain    COMPARISON: November 9, 2023    FINDINGS: Cardiac silhouette size is within normal limits. Atherosclerotic calcification of the aorta. No confluent airspace disease. No pleural effusion or pneumothorax. No acute osseous abnormality.    IMPRESSION:    No acute pulmonary pathology.    Electronically signed by:  Aleksandar Castillo MD  12/04/2023 08:16 AM CST  Workstation: [id number]      ECHOCARDIOGRAM RESULTS (last 5)  Results for orders placed during the hospital encounter of 03/20/23    Echo    Interpretation Summary  · The left ventricle is normal in size with moderate concentric hypertrophy and normal systolic function.  · The estimated ejection fraction is 57%.  · Normal left ventricular diastolic function.  · Normal right ventricular size with normal right ventricular systolic function.  · Moderate to severe tricuspid regurgitation.  · Mild-to-moderate mitral regurgitation.  · Moderate left atrial enlargement.  · Mild right atrial enlargement.  · There is mild pulmonary hypertension.      Results for orders placed during the hospital encounter of 03/06/23    Echo    Interpretation Summary  · The left ventricle is normal in size with normal systolic function.  · The estimated ejection fraction is 60%.  · Grade II left ventricular diastolic dysfunction.  · Normal right ventricular size with normal right ventricular systolic function.  · Moderate left atrial enlargement.  · Mild-to-moderate mitral regurgitation.  · Moderate to severe tricuspid regurgitation.  · There is mild pulmonary hypertension.  · There is mild aortic valve stenosis.  · Aortic valve area is 1.78 cm2; peak velocity is 2.01 m/s; mean gradient is 9 mmHg.      Results for orders placed during the hospital encounter of 01/26/22    Echo    Interpretation Summary  · The left ventricle is normal in size with mild concentric hypertrophy and normal systolic function.  · The estimated ejection fraction is 65%.  · Grade II left ventricular diastolic dysfunction.  · Atrial fibrillation not observed.  · Normal right ventricular size with normal right ventricular systolic function.  · Moderate to severe left atrial enlargement.  · Mild right atrial enlargement.  · Mild mitral regurgitation.  · Mild to moderate tricuspid regurgitation.  · Normal central venous pressure (3 mmHg).  · The estimated PA systolic  pressure is 36 mmHg.  · Mildly elevated gradient accross mitral valve of around 6 mmHg at HR of 64 bpm. MVA by pressure half time is normal, however this can be inaccurate.      Results for orders placed during the hospital encounter of 03/03/21    Echo Color Flow Doppler? Yes    Interpretation Summary  · The left ventricle is small with concentric remodeling and normal systolic function. The estimated ejection fraction is 63%  · The estimated PA systolic pressure is 38 mmHg.  · Grade II left ventricular diastolic dysfunction.  · Normal right ventricular size with normal right ventricular systolic function.  · Mild-to-moderate mitral regurgitation.  · Mild to moderate tricuspid regurgitation.  · Normal central venous pressure (3 mmHg).  · The patient converted from atrial fibrillation to normal sinus rhythm 03/03/2021      Results for orders placed during the hospital encounter of 12/13/19    Echo Color Flow Doppler? Yes    Interpretation Summary  · Mild concentric left ventricular hypertrophy.  · Normal left ventricular systolic function. The estimated ejection fraction is 70%  · Grade II (moderate) left ventricular diastolic dysfunction consistent with pseudonormalization.  · The estimated PA systolic pressure is 37 mm Hg  · No wall motion abnormalities.  · Normal right ventricular systolic function.  · Normal central venous pressure (3 mm Hg).  · Mild mitral sclerosis.  · Mild mitral regurgitation.  · Mild to moderate tricuspid regurgitation.      CURRENT/PREVIOUS VISIT EKG  Results for orders placed or performed during the hospital encounter of 12/04/23   EKG 12-lead    Collection Time: 12/04/23  3:23 PM    Narrative    Test Reason : I47.10,    Vent. Rate : 173 BPM     Atrial Rate : 000 BPM     P-R Int : 000 ms          QRS Dur : 078 ms      QT Int : 248 ms       P-R-T Axes : 000 -39 176 degrees     QTc Int : 420 ms    Supraventricular tachycardia  Left axis deviation  ST and T wave abnormality, consider  inferolateral ischemia  Abnormal ECG  When compared with ECG of 04-DEC-2023 06:45,  Significant changes have occurred    Referred By: [email or web address]   SELF           Confirmed By:            ASSESSMENT/PLAN:     Active Hospital Problems    Diagnosis    *SVT (supraventricular tachycardia)    COVID-19    Atrial fibrillation    CAD s/p PCI    Anemia of chronic disease    Benign hypertension with ESRD (end-stage renal disease)     BP slightly elevated1.21.15 BP elelvated. Took meds in [facility] will increase amlodipine to 10 mg per day. New prescription sent to the pharmacy. She will double the existing prescription. She will continue to monitor her blood pressures.         ASSESSMENT & PLAN:   Afib RVR  CAD  PAF  HTN  ESRD  Anemia  COVID 19      RECOMMENDATIONS:  HS troponin elevation has trended down  Pt denies chest pain or dyspnea.  Pt has history of PAF. Appears to have had episode of RVR overnight. She has converted to sinus/bradycardia.  Obtain EKG for any rhythm change  Discontinue cardizem.  Resume sotalol 80 mg BID in AM; hold if HR < 60 bpm.  Continue Eliquis for anticoagulation  Pt had stress test in March 2023 that was negative for RI  Supportive therapies/treatment for COVID     Tsering Bird NP  Critical access hospital  Department of Cardiology  Date of Service: 12/05/2023        I have personally interviewed and examined the patient, I have reviewed the Nurse Practitioner's history and physical, assessment, and plan. I agree with the findings and plan.    I reviewed EKG from the initial episode, appears more like afib RVR. Because of the fast rates in the 170s it looks more regular. Explains why adenosine did not work. She had some afib RVR episodes later in the night that I can see on the tele monitor as well. She is sinus now, bradycardic. Will discontinue cardizem and resume sotalol tomorrow AM.    Dr. Yury M.D.  Critical access hospital  Department of Cardiology  Date of Service: 12/05/2023  10:22  AM

## 2023-12-05 NOTE — PROGRESS NOTES
"RN notified Dr. Ahn pt appears to be in SVT on tele monitor. Pt currently having HD in room. Pt instructed to "bear down" but no changes in heart rate. EKG ordered, troponin ordered. MD states will be at bedside soon.     Dr. Ahn at bedside at 1520.    "

## 2023-12-05 NOTE — PROGRESS NOTES
"[facility]  Progress Note    Patient Name: Tyra Isaac  MRN: [id number]  Patient Class: IP- Inpatient   Admission Date: 12/4/2023  Length of Stay: 0 days  Attending Physician: Juanito Ahn MD  Primary Care Provider: Kalpesh Goel MD        Subjective:     Principal Problem:COVID-19        HPI:  Tyra Isaac is an 83 year old female with a past medical history of ESRD (MWF), Afib, HTN, anemia, and CAD who presented with worsening fatigue, myalgias, constipation, and cough. She was supposed to go to HD today, but instead she presented to the ED. She was discovered to have COVID-19 on workup with no evidence of pneumonia on CXR. Nephrology was consulted from the ED HD. Hospital Medicine was consulted for admission.    Overview/Hospital Course:  Tyra Isaac is an 83 year old female with a past medical history of ESRD (MWF), Afib, HTN, and CAD who presented with acute COVID-19 forcing her to miss HD. She has no evidence of pneumonia and is not in respiratory failure. While undergoing HD 12/4, she developed an SVT which resolved after adenosine and administration of a diltiazem infusion. She is being monitored in the stepdown unit as she undergoes HD.     Interval History: see "Hospital Course"    Review of Systems   Constitutional:  Positive for activity change, fatigue and fever.   Respiratory:  Positive for cough. Negative for shortness of breath.    Cardiovascular:  Negative for palpitations.   Musculoskeletal:  Positive for myalgias.     Objective:     Vital Signs (Most Recent):  Temp: 98.7 °F (37.1 °C) (12/05/23 1101)  Pulse: (!) 55 (12/05/23 1100)  Resp: (!) 33 (12/05/23 1100)  BP: (!) 118/52 (12/05/23 1100)  SpO2: 96 % (12/05/23 1100) Vital Signs (24h Range):  Temp:  [97.7 °F (36.5 °C)-99.8 °F (37.7 °C)] 98.7 °F (37.1 °C)  Pulse:  [] 55  Resp:  [18-42] 33  SpO2:  [94 %-100 %] 96 %  BP: ()/() 118/52     Weight: 50.6 kg (111 lb 8.8 oz)  Body " mass index is 18.01 kg/m².    Intake/Output Summary (Last 24 hours) at 12/5/2023 1334  Last data filed at 12/5/2023 0850  Gross per 24 hour   Intake 946.42 ml   Output 723 ml   Net 223.42 ml         Physical Exam  Vitals and nursing note reviewed.   Constitutional:       General: She is not in acute distress.     Appearance: She is ill-appearing.   HENT:      Head: Normocephalic and atraumatic.      Right Ear: External ear normal.      Left Ear: External ear normal.      Nose: Nose normal.      Mouth/Throat:      Mouth: Mucous membranes are moist.      Pharynx: Oropharynx is clear.   Eyes:      Extraocular Movements: Extraocular movements intact.      Conjunctiva/sclera: Conjunctivae normal.   Cardiovascular:      Rate and Rhythm: Normal rate and regular rhythm.      Pulses: Normal pulses.      Heart sounds: Normal heart sounds.      Comments: RUE AVF with thrill.  Pulmonary:      Effort: Pulmonary effort is normal.      Breath sounds: Normal breath sounds.      Comments: RA.  Abdominal:      General: Bowel sounds are normal. There is no distension.      Palpations: Abdomen is soft.      Tenderness: There is no abdominal tenderness.   Musculoskeletal:         General: Normal range of motion.      Cervical back: Normal range of motion and neck supple.   Skin:     General: Skin is warm and dry.   Neurological:      Mental Status: She is alert. Mental status is at baseline.   Psychiatric:         Mood and Affect: Mood normal.         Behavior: Behavior normal.             Significant Labs: All pertinent labs within the past 24 hours have been reviewed.    Significant Imaging: I have reviewed all pertinent imaging results/findings within the past 24 hours.    Assessment/Plan:      * COVID-19  No evidence of pneumonia and respiratory failure.  -COVID-19 precautions  -Trend inflammatory markers    Benign hypertension with ESRD (end-stage renal disease)  -Continue home diltiazem; currently held for bradycardia  -HD per  Nephrology  -Renally dose medications    CAD s/p PCI  -Continue home statin  -Telemetry    Atrial fibrillation  -Eliquis  -Diltiazem   -Sotalol  -Telemetry  -Cardiology following    Anemia of chronic disease  Stable.  -Trend Hgb with CBC      VTE Risk Mitigation (From admission, onward)           Ordered     apixaban tablet 2.5 mg  2 times daily         12/04/23 1145     IP VTE HIGH RISK PATIENT  Once         12/04/23 1145     Place sequential compression device  Until discontinued         12/04/23 1145     Reason for No Pharmacological VTE Prophylaxis  Once        Question:  Reasons:  Answer:  Already adequately anticoagulated on oral Anticoagulants    12/04/23 1145     heparin (porcine) injection 5,000 Units  As needed (PRN)         12/04/23 1145                    Discharge Planning   ILAN: 12/7/2023     Code Status: Full Code   Is the patient medically ready for discharge?:     Reason for patient still in hospital (select all that apply): Patient trending condition, Treatment, and Consult recommendations  Discharge Plan A: Home Health                  Juanito Ahn MD  Department of Hospital Medicine   [facility]

## 2023-12-05 NOTE — SUBJECTIVE & OBJECTIVE
"Interval History: see "Hospital Course"    Review of Systems   Constitutional:  Positive for activity change, fatigue and fever.   Respiratory:  Positive for cough. Negative for shortness of breath.    Cardiovascular:  Negative for palpitations.   Musculoskeletal:  Positive for myalgias.     Objective:     Vital Signs (Most Recent):  Temp: 98.7 °F (37.1 °C) (12/05/23 1101)  Pulse: (!) 55 (12/05/23 1100)  Resp: (!) 33 (12/05/23 1100)  BP: (!) 118/52 (12/05/23 1100)  SpO2: 96 % (12/05/23 1100) Vital Signs (24h Range):  Temp:  [97.7 °F (36.5 °C)-99.8 °F (37.7 °C)] 98.7 °F (37.1 °C)  Pulse:  [] 55  Resp:  [18-42] 33  SpO2:  [94 %-100 %] 96 %  BP: ()/() 118/52     Weight: 50.6 kg (111 lb 8.8 oz)  Body mass index is 18.01 kg/m².    Intake/Output Summary (Last 24 hours) at 12/5/2023 1334  Last data filed at 12/5/2023 0850  Gross per 24 hour   Intake 946.42 ml   Output 723 ml   Net 223.42 ml         Physical Exam  Vitals and nursing note reviewed.   Constitutional:       General: She is not in acute distress.     Appearance: She is ill-appearing.   HENT:      Head: Normocephalic and atraumatic.      Right Ear: External ear normal.      Left Ear: External ear normal.      Nose: Nose normal.      Mouth/Throat:      Mouth: Mucous membranes are moist.      Pharynx: Oropharynx is clear.   Eyes:      Extraocular Movements: Extraocular movements intact.      Conjunctiva/sclera: Conjunctivae normal.   Cardiovascular:      Rate and Rhythm: Normal rate and regular rhythm.      Pulses: Normal pulses.      Heart sounds: Normal heart sounds.      Comments: RUE AVF with thrill.  Pulmonary:      Effort: Pulmonary effort is normal.      Breath sounds: Normal breath sounds.      Comments: RA.  Abdominal:      General: Bowel sounds are normal. There is no distension.      Palpations: Abdomen is soft.      Tenderness: There is no abdominal tenderness.   Musculoskeletal:         General: Normal range of motion.      Cervical " back: Normal range of motion and neck supple.   Skin:     General: Skin is warm and dry.   Neurological:      Mental Status: She is alert. Mental status is at baseline.   Psychiatric:         Mood and Affect: Mood normal.         Behavior: Behavior normal.             Significant Labs: All pertinent labs within the past 24 hours have been reviewed.    Significant Imaging: I have reviewed all pertinent imaging results/findings within the past 24 hours.

## 2023-12-06 LAB
ALBUMIN SERPL BCP-MCNC: 3.1 G/DL (ref 3.5–5.2)
ALP SERPL-CCNC: 53 U/L (ref 55–135)
ALT SERPL W/O P-5'-P-CCNC: 12 U/L (ref 10–44)
ANION GAP SERPL CALC-SCNC: 12 MMOL/L (ref 8–16)
AST SERPL-CCNC: 23 U/L (ref 10–40)
BASOPHILS # BLD AUTO: 0.01 K/UL (ref 0–0.2)
BASOPHILS NFR BLD: 0.2 % (ref 0–1.9)
BILIRUB SERPL-MCNC: 0.7 MG/DL (ref 0.1–1)
BUN SERPL-MCNC: 45 MG/DL (ref 8–23)
CALCIUM SERPL-MCNC: 7.9 MG/DL (ref 8.7–10.5)
CHLORIDE SERPL-SCNC: 103 MMOL/L (ref 95–110)
CO2 SERPL-SCNC: 24 MMOL/L (ref 23–29)
CREAT SERPL-MCNC: 7.5 MG/DL (ref 0.5–1.4)
DIFFERENTIAL METHOD BLD: ABNORMAL
EOSINOPHIL # BLD AUTO: 0.2 K/UL (ref 0–0.5)
EOSINOPHIL NFR BLD: 4.5 % (ref 0–8)
ERYTHROCYTE [DISTWIDTH] IN BLOOD BY AUTOMATED COUNT: 15.4 % (ref 11.5–14.5)
EST. GFR  (NO RACE VARIABLE): 5 ML/MIN/1.73 M^2
GLUCOSE SERPL-MCNC: 105 MG/DL (ref 70–110)
HCT VFR BLD AUTO: 31.5 % (ref 37–48.5)
HGB BLD-MCNC: 10 G/DL (ref 12–16)
IMM GRANULOCYTES # BLD AUTO: 0.01 K/UL (ref 0–0.04)
IMM GRANULOCYTES NFR BLD AUTO: 0.2 % (ref 0–0.5)
LYMPHOCYTES # BLD AUTO: 0.7 K/UL (ref 1–4.8)
LYMPHOCYTES NFR BLD: 14 % (ref 18–48)
MAGNESIUM SERPL-MCNC: 2 MG/DL (ref 1.6–2.6)
MCH RBC QN AUTO: 31.5 PG (ref 27–31)
MCHC RBC AUTO-ENTMCNC: 31.7 G/DL (ref 32–36)
MCV RBC AUTO: 99 FL (ref 82–98)
MONOCYTES # BLD AUTO: 0.5 K/UL (ref 0.3–1)
MONOCYTES NFR BLD: 10.6 % (ref 4–15)
NEUTROPHILS # BLD AUTO: 3.3 K/UL (ref 1.8–7.7)
NEUTROPHILS NFR BLD: 70.5 % (ref 38–73)
NRBC BLD-RTO: 0 /100 WBC
PHOSPHATE SERPL-MCNC: 3.3 MG/DL (ref 2.7–4.5)
PLATELET # BLD AUTO: 163 K/UL (ref 150–450)
PMV BLD AUTO: 11.1 FL (ref 9.2–12.9)
POTASSIUM SERPL-SCNC: 4 MMOL/L (ref 3.5–5.1)
PROT SERPL-MCNC: 6.5 G/DL (ref 6–8.4)
RBC # BLD AUTO: 3.17 M/UL (ref 4–5.4)
SODIUM SERPL-SCNC: 139 MMOL/L (ref 136–145)
WBC # BLD AUTO: 4.64 K/UL (ref 3.9–12.7)

## 2023-12-06 PROCEDURE — 25000003 PHARM REV CODE 250: Performed by: STUDENT IN AN ORGANIZED HEALTH CARE EDUCATION/TRAINING PROGRAM

## 2023-12-06 PROCEDURE — 94761 N-INVAS EAR/PLS OXIMETRY MLT: CPT

## 2023-12-06 PROCEDURE — 99900035 HC TECH TIME PER 15 MIN (STAT)

## 2023-12-06 PROCEDURE — 21000000 HC CCU ICU ROOM CHARGE

## 2023-12-06 PROCEDURE — 99900031 HC PATIENT EDUCATION (STAT)

## 2023-12-06 PROCEDURE — 80053 COMPREHEN METABOLIC PANEL: CPT | Performed by: STUDENT IN AN ORGANIZED HEALTH CARE EDUCATION/TRAINING PROGRAM

## 2023-12-06 PROCEDURE — 90935 HEMODIALYSIS ONE EVALUATION: CPT

## 2023-12-06 PROCEDURE — 84100 ASSAY OF PHOSPHORUS: CPT | Performed by: STUDENT IN AN ORGANIZED HEALTH CARE EDUCATION/TRAINING PROGRAM

## 2023-12-06 PROCEDURE — 85025 COMPLETE CBC W/AUTO DIFF WBC: CPT | Performed by: STUDENT IN AN ORGANIZED HEALTH CARE EDUCATION/TRAINING PROGRAM

## 2023-12-06 PROCEDURE — 99233 SBSQ HOSP IP/OBS HIGH 50: CPT | Mod: ,,, | Performed by: STUDENT IN AN ORGANIZED HEALTH CARE EDUCATION/TRAINING PROGRAM

## 2023-12-06 PROCEDURE — 36415 COLL VENOUS BLD VENIPUNCTURE: CPT | Performed by: STUDENT IN AN ORGANIZED HEALTH CARE EDUCATION/TRAINING PROGRAM

## 2023-12-06 PROCEDURE — 83735 ASSAY OF MAGNESIUM: CPT | Performed by: STUDENT IN AN ORGANIZED HEALTH CARE EDUCATION/TRAINING PROGRAM

## 2023-12-06 PROCEDURE — 25000003 PHARM REV CODE 250: Performed by: NURSE PRACTITIONER

## 2023-12-06 PROCEDURE — 63600175 PHARM REV CODE 636 W HCPCS: Mod: JZ | Performed by: STUDENT IN AN ORGANIZED HEALTH CARE EDUCATION/TRAINING PROGRAM

## 2023-12-06 RX ORDER — FACIAL-BODY WIPES
10 EACH TOPICAL ONCE
Status: COMPLETED | OUTPATIENT
Start: 2023-12-06 | End: 2023-12-06

## 2023-12-06 RX ORDER — AMOXICILLIN 250 MG
1 CAPSULE ORAL 2 TIMES DAILY
Status: DISCONTINUED | OUTPATIENT
Start: 2023-12-06 | End: 2023-12-15 | Stop reason: HOSPADM

## 2023-12-06 RX ADMIN — MUPIROCIN 1 G: 20 OINTMENT TOPICAL at 02:12

## 2023-12-06 RX ADMIN — BISACODYL 10 MG: 10 SUPPOSITORY RECTAL at 08:12

## 2023-12-06 RX ADMIN — SOTALOL HYDROCHLORIDE 80 MG: 80 TABLET ORAL at 08:12

## 2023-12-06 RX ADMIN — APIXABAN 2.5 MG: 2.5 TABLET, FILM COATED ORAL at 02:12

## 2023-12-06 RX ADMIN — ATORVASTATIN CALCIUM 40 MG: 40 TABLET, FILM COATED ORAL at 02:12

## 2023-12-06 RX ADMIN — EPOETIN ALFA-EPBX 2400 UNITS: 10000 INJECTION, SOLUTION INTRAVENOUS; SUBCUTANEOUS at 12:12

## 2023-12-06 RX ADMIN — SOTALOL HYDROCHLORIDE 80 MG: 80 TABLET ORAL at 02:12

## 2023-12-06 RX ADMIN — DORZOLAMIDE HYDROCHLORIDE AND TIMOLOL MALEATE 1 DROP: 20; 5 SOLUTION/ DROPS OPHTHALMIC at 08:12

## 2023-12-06 RX ADMIN — APIXABAN 2.5 MG: 2.5 TABLET, FILM COATED ORAL at 08:12

## 2023-12-06 RX ADMIN — DORZOLAMIDE HYDROCHLORIDE AND TIMOLOL MALEATE 1 DROP: 20; 5 SOLUTION/ DROPS OPHTHALMIC at 02:12

## 2023-12-06 RX ADMIN — MUPIROCIN 1 G: 20 OINTMENT TOPICAL at 08:12

## 2023-12-06 RX ADMIN — LATANOPROST 1 DROP: 50 SOLUTION OPHTHALMIC at 08:12

## 2023-12-06 RX ADMIN — SENNOSIDES AND DOCUSATE SODIUM 1 TABLET: 8.6; 5 TABLET ORAL at 08:12

## 2023-12-06 RX ADMIN — CALCIUM ACETATE 667 MG: 667 CAPSULE ORAL at 02:12

## 2023-12-06 NOTE — PLAN OF CARE
12/06/23 0850   Patient Assessment/Suction   Level of Consciousness (AVPU) alert   Respiratory Effort Unlabored   PRE-TX-O2   Device (Oxygen Therapy) room air   SpO2 100 %   Pulse Oximetry Type Continuous   $ Pulse Oximetry - Multiple Charge Pulse Oximetry - Multiple   Pulse 71   Resp (!) 26   Respiratory Evaluation   $ Care Plan Tech Time 15 min

## 2023-12-06 NOTE — PROGRESS NOTES
"[facility]  Department of Cardiology  Progress Note      PATIENT NAME: Tyra Isaac  MRN: [id number]  TODAY'S DATE: 12/06/2023  ADMIT DATE: 12/4/2023                          CONSULT REQUESTED BY: Juanito Ahn MD    SUBJECTIVE     PRINCIPAL PROBLEM: COVID-19      REASON FOR CONSULT:  SVT    INTERVAL HISTORY:  12/6/23:  Pt has not any further episodes of RVR. She tolerated HD today w/o abnormal rhythm or tachycardia.     HPI: Pt is 84 yo female admitted for covid 19 as below. Cardiology consulted for SVT vs AF RVR during dialysis yesterday evening around 7 pm.    Pt is resting in bed. Denies chest pain or dyspnea on room air. Reports nausea and vomiting and generalized malaise    Hospitalist HPI: "Tyra Isaac is an 83 year old female with a past medical history of ESRD (MWF), Afib, HTN, anemia, and CAD who presented with worsening fatigue, myalgias, constipation, and cough. She was supposed to go to HD today, but instead she presented to the ED. She was discovered to have COVID-19 on workup with no evidence of pneumonia on CXR. Nephrology was consulted from the ED HD. Hospital Medicine was consulted for admission."     Notes from 12/4/23: 3:58 pm-   Patient was undergoing dialysis at the bedside when her heart rate began to increase on telemetry. EKG showed SVT. She received 6 mg followed by 12 mg of adenosine without improvement in her heart rate. Cardiology was consulted and recommended a diltiazem bolus and infusion. The patient will be transferred to the stepdown unit.     Nurse reported pt converted to sinus zane around 11pm last night. She has been sinus zane since conversion.    Review of patient's allergies indicates:   Allergen Reactions    Cyclobenzaprine     Fish containing products Hives    Peanut Other (See Comments)    Tramadol Itching       Past Medical History:   Diagnosis Date    A-fib     Anxiety     Depression     Disorder of kidney and ureter     Encephalopathy acute " 1/1/2018    End stage kidney disease 6/17/2017    Gout     Hyperlipidemia     Hypertension     Moderate episode of recurrent major depressive disorder 1/17/2018    Nephropathy hypertensive, 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    Osteopenia of multiple sites 3/9/2018    Based upon bone density measurements. Patient also has chronic kidney disease.    Stroke 11/2016     Past Surgical History:   Procedure Laterality Date    ANGIOGRAM, CORONARY, WITH LEFT HEART CATHETERIZATION N/A 2/7/2022    Procedure: Angiogram, Coronary, with Left Heart Cath;  Surgeon: Gino Leal MD;  Location: [facility] CATH/EP LAB;  Service: Cardiology;  Laterality: N/A;    CARDIAC SURGERY      stents    EYE SURGERY      WRIST SURGERY       Social History     Tobacco Use    Smoking status: Never    Smokeless tobacco: Never   Substance Use Topics    Alcohol use: No    Drug use: No        REVIEW OF SYSTEMS  Negative except as mentioned in HPI    OBJECTIVE     VITAL SIGNS (Most Recent)  Temp: 98 °F (36.7 °C) (12/06/23 1300)  Pulse: 81 (12/06/23 1300)  Resp: (!) 22 (12/06/23 1300)  BP: (!) 121/55 (12/06/23 1414)  SpO2: 100 % (12/06/23 1300)    VENTILATION STATUS  Resp: (!) 22 (12/06/23 1300)  SpO2: 100 % (12/06/23 1300)           I & O (Last 24H):  Intake/Output Summary (Last 24 hours) at 12/6/2023 1448  Last data filed at 12/6/2023 1300  Gross per 24 hour   Intake 700 ml   Output 3794 ml   Net -3094 ml         WEIGHTS  Wt Readings from Last 3 Encounters:   12/04/23 2220 50.6 kg (111 lb 8.8 oz)   12/04/23 0636 48.1 kg (106 lb)   11/29/23 1040 48.5 kg (107 lb)   11/09/23 2243 52.2 kg (115 lb 1.3 oz)   11/09/23 1651 52.2 kg (115 lb)       PHYSICAL EXAM    GENERAL: fatigued elderly female resting in bed in no apparent distress.  HEENT: Normocephalic.    NECK: No JVD.   CARDIAC: Regular bradycardic rate and rhythm. S1 is  normal.S2 is normal.No gallops, clicks or murmurs noted at this time.  CHEST ANATOMY: normal.   LUNGS: Clear to auscultation. No wheezing or rhonchi..   ABDOMEN: Soft, flat, thin, non tender. Normal bowel sounds.    EXTREMITIES: No edema  CENTRAL NERVOUS SYSTEM: AAO x 3  SKIN: No rash     HOME MEDICATIONS:  No current facility-administered medications on file prior to encounter.     Current Outpatient Medications on File Prior to Encounter   Medication Sig Dispense Refill    atorvastatin (LIPITOR) 40 MG tablet Take 40 mg by mouth once daily.      b complex vitamins tablet Take 1 tablet by mouth once daily.      calcium acetate,phosphat bind, (PHOSLO) 667 mg tablet Take 667 mg by mouth 2 (two) times daily with meals.      diltiaZEM (CARDIZEM CD) 120 MG Cp24 Take 1 capsule (120 mg total) by mouth 2 (two) times a day.      dorzolamide-timolol 2-0.5% (COSOPT) 22.3-6.8 mg/mL ophthalmic solution Place 1 drop into both eyes 2 (two) times daily.      ELIQUIS 2.5 mg Tab Take 2.5 mg by mouth 2 (two) times daily.      latanoprost 0.005 % ophthalmic solution Place 1 drop into both eyes every evening.      loperamide (IMODIUM A-D) 2 mg Tab Take 2 mg by mouth 4 (four) times daily as needed.      ondansetron (ZOFRAN) 4 MG tablet Take 1 tablet (4 mg total) by mouth every 8 (eight) hours as needed for Nausea. 15 tablet 0    sotaloL (BETAPACE) 80 MG tablet Take 1 tablet (80 mg total) by mouth 2 (two) times daily. 60 tablet 0       SCHEDULED MEDS:   apixaban  2.5 mg Oral BID    atorvastatin  40 mg Oral Daily    calcium acetate(phosphat bind)  667 mg Oral Daily    dorzolamide-timolol 2-0.5%  1 drop Both Eyes BID    epoetin rosy-epbx  50 Units/kg Intravenous Every Mon, Wed, Fri    latanoprost  1 drop Both Eyes QHS    mupirocin   Nasal BID    senna-docusate 8.6-50 mg  1 tablet Oral BID    sotaloL  80 mg Oral BID       CONTINUOUS INFUSIONS:    PRN MEDS:sodium chloride 0.9%, acetaminophen, heparin (porcine), LIDOcaine-prilocaine,  "melatonin, naloxone, ondansetron, sodium chloride 0.9%, sodium chloride 0.9%    LABS AND DIAGNOSTICS     CBC LAST 3 DAYS  Recent Labs   Lab 12/04/23  0704 12/05/23  0437 12/06/23  0300   WBC 6.49 5.24 4.64   RBC 3.30* 3.13* 3.17*   HGB 10.3* 9.7* 10.0*   HCT 33.0* 30.8* 31.5*   * 98 99*   MCH 31.2* 31.0 31.5*   MCHC 31.2* 31.5* 31.7*   RDW 15.6* 15.5* 15.4*    147* 163   MPV 10.8 10.8 11.1   GRAN 68.1  4.4 64.7  3.4 70.5  3.3   LYMPH 13.7*  0.9* 15.6*  0.8* 14.0*  0.7*   MONO 17.1*  1.1* 18.5*  1.0 10.6  0.5   BASO 0.02 0.02 0.01   NRBC 0 0 0         COAGULATION LAST 3 DAYS  No results for input(s): "LABPT", "INR", "APTT" in the last 168 hours.    CHEMISTRY LAST 3 DAYS  Recent Labs   Lab 12/04/23  0704 12/05/23  0437 12/06/23  0300    140 139   K 5.0 5.1 4.0   CL 97 100 103   CO2 25 26 24   ANIONGAP 18* 14 12   BUN 59* 56* 45*   CREATININE 10.6* 9.2* 7.5*   * 90 105   CALCIUM 8.0* 8.1* 7.9*   MG 2.0 2.0 2.0   ALBUMIN 3.7 3.3* 3.1*   PROT 7.4 6.7 6.5   ALKPHOS 46* 59 53*   ALT 9* 11 12   AST 27 24 23   BILITOT 0.8 0.8 0.7         CARDIAC PROFILE LAST 3 DAYS  Recent Labs   Lab 12/04/23  0704 12/05/23  0437   BNP 2,609*  --    LDH  --  197         ENDOCRINE LAST 3 DAYS  No results for input(s): "TSH", "PROCAL" in the last 168 hours.    LAST ARTERIAL BLOOD GAS  ABG  No results for input(s): "PH", "PO2", "PCO2", "HCO3", "BE" in the last 168 hours.    LAST 7 DAYS MICROBIOLOGY   Microbiology Results (last 7 days)       Procedure Component Value Units Date/Time    Blood culture [[id number]] Collected: 12/04/23 0713    Order Status: Completed Specimen: Blood Updated: 12/06/23 0832     Blood Culture, Routine No Growth to date      No Growth to date      No Growth to date    Narrative:      Collection has been rescheduled by [email or web address] at 12/04/2023 07:14 Reason:   Done per phil  Collection has been rescheduled by [email or web address] at 12/04/2023 07:14 Reason:   Done per phil    Blood culture [[id number]] " Collected: 12/04/23 0709    Order Status: Completed Specimen: Blood Updated: 12/06/23 0832     Blood Culture, Routine No Growth to date      No Growth to date      No Growth to date    Narrative:      Collection has been rescheduled by [email or web address] at 12/04/2023 07:14 Reason:   Done per phil  Collection has been rescheduled by [email or web address] at 12/04/2023 07:14 Reason:   Done per phil    Respiratory Infection Panel (PCR), Nasopharyngeal [[id number]]  (Abnormal) Collected: 12/04/23 0645    Order Status: Completed Updated: 12/04/23 0947     Respiratory Infection Panel Source NP swab     Adenovirus Not Detected     Coronavirus 229E, Common Cold Virus Not Detected     Coronavirus HKU1, Common Cold Virus Not Detected     Coronavirus NL63, Common Cold Virus Not Detected     Coronavirus OC43, Common Cold Virus Not Detected     Comment: Coronavirus strains 229E, HKU1, NL63, and OC43 can cause the common   cold   and are not associated with the respiratory disease outbreak caused   by  the COVID-19 (SARS-CoV-2 novel Coronavirus) strain.           SARS-CoV2 (COVID-19) Qualitative PCR Detected     Human Metapneumovirus Not Detected     Human Rhinovirus/Enterovirus Not Detected     Influenza A (subtypes H1, H1-2009,H3) Not Detected     Influenza B Not Detected     Parainfluenza Virus 1 Not Detected     Parainfluenza Virus 2 Not Detected     Parainfluenza Virus 3 Not Detected     Parainfluenza Virus 4 Not Detected     Respiratory Syncytial Virus Not Detected     Bordetella Parapertussis (HT4943) Not Detected     Bordetella pertussis (ptxP) Not Detected     Chlamydia pneumoniae Not Detected     Mycoplasma pneumoniae Not Detected     Comment: Respiratory Infection Panel testing performed by Multiplex PCR.       Narrative:      Specimen Source->Nasopharyngeal Swab    Resp Viral Panel PCR, Peds Under 7 Months Nasopharyngeal Swab [[id number]] Collected: 12/04/23 0645    Order Status: Canceled             MOST RECENT IMAGING  X-Ray Chest AP  Portable  XR CHEST 1 VIEW    CLINICAL HISTORY:  83 years Female Chest Pain    COMPARISON: November 9, 2023    FINDINGS: Cardiac silhouette size is within normal limits. Atherosclerotic calcification of the aorta. No confluent airspace disease. No pleural effusion or pneumothorax. No acute osseous abnormality.    IMPRESSION:    No acute pulmonary pathology.    Electronically signed by:  Aleksandar Castillo MD  12/04/2023 08:16 AM CST Workstation: [id number]      ECHOCARDIOGRAM RESULTS (last 5)  Results for orders placed during the hospital encounter of 03/20/23    Echo    Interpretation Summary  · The left ventricle is normal in size with moderate concentric hypertrophy and normal systolic function.  · The estimated ejection fraction is 57%.  · Normal left ventricular diastolic function.  · Normal right ventricular size with normal right ventricular systolic function.  · Moderate to severe tricuspid regurgitation.  · Mild-to-moderate mitral regurgitation.  · Moderate left atrial enlargement.  · Mild right atrial enlargement.  · There is mild pulmonary hypertension.      Results for orders placed during the hospital encounter of 03/06/23    Echo    Interpretation Summary  · The left ventricle is normal in size with normal systolic function.  · The estimated ejection fraction is 60%.  · Grade II left ventricular diastolic dysfunction.  · Normal right ventricular size with normal right ventricular systolic function.  · Moderate left atrial enlargement.  · Mild-to-moderate mitral regurgitation.  · Moderate to severe tricuspid regurgitation.  · There is mild pulmonary hypertension.  · There is mild aortic valve stenosis.  · Aortic valve area is 1.78 cm2; peak velocity is 2.01 m/s; mean gradient is 9 mmHg.      Results for orders placed during the hospital encounter of 01/26/22    Echo    Interpretation Summary  · The left ventricle is normal in size with mild concentric hypertrophy and normal systolic function.  · The  estimated ejection fraction is 65%.  · Grade II left ventricular diastolic dysfunction.  · Atrial fibrillation not observed.  · Normal right ventricular size with normal right ventricular systolic function.  · Moderate to severe left atrial enlargement.  · Mild right atrial enlargement.  · Mild mitral regurgitation.  · Mild to moderate tricuspid regurgitation.  · Normal central venous pressure (3 mmHg).  · The estimated PA systolic pressure is 36 mmHg.  · Mildly elevated gradient accross mitral valve of around 6 mmHg at HR of 64 bpm. MVA by pressure half time is normal, however this can be inaccurate.      Results for orders placed during the hospital encounter of 03/03/21    Echo Color Flow Doppler? Yes    Interpretation Summary  · The left ventricle is small with concentric remodeling and normal systolic function. The estimated ejection fraction is 63%  · The estimated PA systolic pressure is 38 mmHg.  · Grade II left ventricular diastolic dysfunction.  · Normal right ventricular size with normal right ventricular systolic function.  · Mild-to-moderate mitral regurgitation.  · Mild to moderate tricuspid regurgitation.  · Normal central venous pressure (3 mmHg).  · The patient converted from atrial fibrillation to normal sinus rhythm 03/03/2021      Results for orders placed during the hospital encounter of 12/13/19    Echo Color Flow Doppler? Yes    Interpretation Summary  · Mild concentric left ventricular hypertrophy.  · Normal left ventricular systolic function. The estimated ejection fraction is 70%  · Grade II (moderate) left ventricular diastolic dysfunction consistent with pseudonormalization.  · The estimated PA systolic pressure is 37 mm Hg  · No wall motion abnormalities.  · Normal right ventricular systolic function.  · Normal central venous pressure (3 mm Hg).  · Mild mitral sclerosis.  · Mild mitral regurgitation.  · Mild to moderate tricuspid regurgitation.      CURRENT/PREVIOUS VISIT EKG  Results  for orders placed or performed during the hospital encounter of 12/04/23   EKG 12-lead    Collection Time: 12/04/23  3:23 PM    Narrative    Test Reason : I47.10,    Vent. Rate : 173 BPM     Atrial Rate : 000 BPM     P-R Int : 000 ms          QRS Dur : 078 ms      QT Int : 248 ms       P-R-T Axes : 000 -39 176 degrees     QTc Int : 420 ms    Supraventricular tachycardia  Left axis deviation  ST and T wave abnormality, consider inferolateral ischemia  Abnormal ECG  When compared with ECG of 04-DEC-2023 06:45,  Significant changes have occurred    Referred By: [email or web address]   SELF           Confirmed By:            ASSESSMENT/PLAN:     Active Hospital Problems    Diagnosis    *COVID-19    Atrial fibrillation    CAD s/p PCI    Anemia of chronic disease    Benign hypertension with ESRD (end-stage renal disease)     BP slightly elevated1.21.15 BP elelvated. Took meds in [facility]I will increase amlodipine to 10 mg per day. New prescription sent to the pharmacy. She will double the existing prescription. She will continue to monitor her blood pressures.         ASSESSMENT & PLAN:   Afib RVR  CAD  PAF  HTN  ESRD  Anemia  COVID 19      RECOMMENDATIONS:  Pt had HS troponin elevation that has trended down  Pt denies chest pain or dyspnea.  Stress test in March 2023 that was negative for RI    Pt has remained in NSR overnight.  Tolerated HD w/o RVR episodes today  Obtain EKG for any rhythm change  Discontinued cardizem.  Resumed sotalol 80 mg BID today; hold if HR < 60 bpm.  Continue Eliquis for anticoagulation    Supportive therapies/treatment for COVID   Cardiology will sign off and see PRN    Tsering Bird NP  [facility]  Department of Cardiology  Date of Service: 12/06/2023

## 2023-12-06 NOTE — SUBJECTIVE & OBJECTIVE
"Interval History: see "Hospital Course"    Review of Systems   Constitutional:  Positive for activity change and fatigue. Negative for fever.   Respiratory:  Positive for cough. Negative for shortness of breath.    Cardiovascular:  Negative for palpitations.   Musculoskeletal:  Positive for myalgias.     Objective:     Vital Signs (Most Recent):  Temp: 98 °F (36.7 °C) (12/06/23 1300)  Pulse: 81 (12/06/23 1300)  Resp: (!) 22 (12/06/23 1300)  BP: (!) 117/49 (12/06/23 1300)  SpO2: 100 % (12/06/23 1300) Vital Signs (24h Range):  Temp:  [97.7 °F (36.5 °C)-98.7 °F (37.1 °C)] 98 °F (36.7 °C)  Pulse:  [53-83] 81  Resp:  [20-37] 22  SpO2:  [93 %-100 %] 100 %  BP: ()/(40-55) 117/49     Weight: 50.6 kg (111 lb 8.8 oz)  Body mass index is 18.01 kg/m².    Intake/Output Summary (Last 24 hours) at 12/6/2023 1358  Last data filed at 12/6/2023 1300  Gross per 24 hour   Intake 700 ml   Output 3794 ml   Net -3094 ml         Physical Exam  Vitals and nursing note reviewed.   Constitutional:       General: She is not in acute distress.     Appearance: She is ill-appearing.   HENT:      Head: Normocephalic and atraumatic.      Right Ear: External ear normal.      Left Ear: External ear normal.      Nose: Nose normal.      Mouth/Throat:      Mouth: Mucous membranes are moist.      Pharynx: Oropharynx is clear.   Eyes:      Extraocular Movements: Extraocular movements intact.      Conjunctiva/sclera: Conjunctivae normal.   Cardiovascular:      Rate and Rhythm: Normal rate and regular rhythm.      Pulses: Normal pulses.      Heart sounds: Normal heart sounds.      Comments: RUE AVF with thrill.  Pulmonary:      Effort: Pulmonary effort is normal.      Breath sounds: Normal breath sounds.      Comments: RA.  Abdominal:      General: Bowel sounds are normal. There is no distension.      Palpations: Abdomen is soft.      Tenderness: There is no abdominal tenderness.   Musculoskeletal:         General: Normal range of motion.      " Cervical back: Normal range of motion and neck supple.   Skin:     General: Skin is warm and dry.   Neurological:      Mental Status: She is alert. Mental status is at baseline.   Psychiatric:         Mood and Affect: Mood normal.         Behavior: Behavior normal.             Significant Labs: All pertinent labs within the past 24 hours have been reviewed.    Significant Imaging: I have reviewed all pertinent imaging results/findings within the past 24 hours.

## 2023-12-06 NOTE — PLAN OF CARE
Problem: Adult Inpatient Plan of Care  Goal: Plan of Care Review  12/6/2023 1558 by Padmini Phan RN  Outcome: Ongoing, Progressing    Problem: Adult Inpatient Plan of Care  Goal: Absence of Hospital-Acquired Illness or Injury  12/6/2023 1558 by Padmini Phan RN  Outcome: Ongoing, Progressing    Problem: Adult Inpatient Plan of Care  Goal: Optimal Comfort and Wellbeing  12/6/2023 1558 by Padmini Phan RN  Outcome: Ongoing, Progressing     Problem: Adult Inpatient Plan of Care  Goal: Readiness for Transition of Care  Outcome: Ongoing, Progressing     Problem: Device-Related Complication Risk (Hemodialysis)  Goal: Safe, Effective Therapy Delivery  12/6/2023 1558 by Padmini Phan RN  Outcome: Ongoing, Progressing     Problem: Hemodynamic Instability (Hemodialysis)  Goal: Effective Tissue Perfusion  12/6/2023 1558 by Padmini Phan RN  Outcome: Ongoing, Progressing  12/6/2023 1556 by Padmini Phan RN  Outcome: Ongoing, Progressing     Problem: Infection (Hemodialysis)  Goal: Absence of Infection Signs and Symptoms  12/6/2023 1558 by Padmini Phan RN  Outcome: Ongoing, Progressing     Problem: Fall Injury Risk  Goal: Absence of Fall and Fall-Related Injury  12/6/2023 1558 by Padmini Phan RN  Outcome: Ongoing, Progressing     Problem: Skin Injury Risk Increased  Goal: Skin Health and Integrity  Outcome: Ongoing, Progressing     Problem: Oral Intake Inadequate  Goal: Improved Oral Intake  12/6/2023 1558 by Padmini Phan RN  Outcome: Ongoing, Progressing

## 2023-12-06 NOTE — CONSULTS
INPATIENT NEPHROLOGY CONSULT   [facility] NEPHROLOGY    Tyra Isaac  12/06/2023    Reason for consultation:    ESRD    Chief Complaint:   Chief Complaint   Patient presents with    Vomiting    Cough    Fatigue     Started with weakness 2 days ago. Dialysis mwf, started vomiting this am.            History of Present Illness:    Per ER  83-year-old female with history of end-stage renal disease with dialysis on Monday Wednesday Friday followed by Dr. Maynard with right upper extremity AV fistula, last dialysis on Friday, atrial fibrillation on Eliquis, hypertension, hyperlipidemia, anxiety, gout, CVA, COPD.  Patient with recent diagnosis of pneumonia 2 weeks ago, completed antibiotics.  She presents emergency department with complaint of persistent cough, shortness of breath, generalized weakness, nausea, vomiting over last 2 days.  Patient states that she has generalized pain all over.  Was to go to dialysis today at 6:00 a.m. however came to the emergency department due to generalized weakness, cough, fatigue.  Her son who is her primary caretaker states that he feels his mother has too many medications to take this is also causing her to feel bad.     12/5  dialysis stopped yesterday due to SVT.  Back in SR.   Mild sob.  Generalized achiness.  No angina.    12/6  underwent dialysis with 1500 cc uf.  No arrhythmias during treatment    Plan of Care:       Assessment:    esrd  --continue dialysis per routine  --fluid restrict  --renal dose medication per routine  --continue outpt medication  --continue binders with meals       Anemia  --erythropoiesis stimulating agent with renal replacement therapy    Hyperphosphatemia  --renal diet  --continue binders    Hypertension  --uf with hd  --fluid restrict  --low salt diet  --continue home medication    Pulmonary edema--better after ultrafiltration  --uf as tolerated  --fluid restrict          Thank you for allowing us to participate in this patient's care. We will  continue to follow.    Vital Signs:  Temp Readings from Last 3 Encounters:   12/06/23 98 °F (36.7 °C)   11/14/23 98.2 °F (36.8 °C) (Oral)   07/22/23 97.9 °F (36.6 °C) (Oral)       Pulse Readings from Last 3 Encounters:   12/06/23 81   11/29/23 (!) 50   11/14/23 68       BP Readings from Last 3 Encounters:   12/06/23 (!) 121/55   11/29/23 (!) 130/54   11/14/23 121/60       Weight:  Wt Readings from Last 3 Encounters:   12/04/23 50.6 kg (111 lb 8.8 oz)   11/29/23 48.5 kg (107 lb)   11/09/23 52.2 kg (115 lb 1.3 oz)       Past Medical & Surgical History:  Past Medical History:   Diagnosis Date    A-fib     Anxiety     Depression     Disorder of kidney and ureter     Encephalopathy acute 1/1/2018    End stage kidney disease 6/17/2017    Gout     Hyperlipidemia     Hypertension     Moderate episode of recurrent major depressive disorder 1/17/2018    Nephropathy hypertensive, 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    Osteopenia of multiple sites 3/9/2018    Based upon bone density measurements. Patient also has chronic kidney disease.    Stroke 11/2016       Past Surgical History:   Procedure Laterality Date    ANGIOGRAM, CORONARY, WITH LEFT HEART CATHETERIZATION N/A 2/7/2022    Procedure: Angiogram, Coronary, with Left Heart Cath;  Surgeon: Gino Leal MD;  Location: [facility] CATH/EP LAB;  Service: Cardiology;  Laterality: N/A;    CARDIAC SURGERY      stents    EYE SURGERY      WRIST SURGERY         Past Social History:  Social History     Socioeconomic History    Marital status:      Spouse name: Aria Isaac    Number of children: 3   Occupational History    Occupation: Not working   Tobacco Use    Smoking status: Never    Smokeless tobacco: Never   Substance and Sexual Activity    Alcohol use: No    Drug use: No    Sexual activity: Not Currently     Social Determinants of  Health     Financial Resource Strain: Medium Risk (6/15/2022)    Overall Financial Resource Strain (CARDIA)     Difficulty of Paying Living Expenses: Somewhat hard   Food Insecurity: No Food Insecurity (6/15/2022)    Hunger Vital Sign     Worried About Running Out of Food in the Last Year: Never true     Ran Out of Food in the Last Year: Never true   Transportation Needs: Unmet Transportation Needs (3/29/2023)    PRAPARE - Transportation     Lack of Transportation (Medical): Yes     Lack of Transportation (Non-Medical): No   Physical Activity: Inactive (6/15/2022)    Exercise Vital Sign     Days of Exercise per Week: 0 days     Minutes of Exercise per Session: 0 min   Stress: Stress Concern Present (6/15/2022)    [employer] - Occupational Stress Questionnaire     Feeling of Stress : To some extent   Social Connections: Moderately Isolated (6/15/2022)    Social Connection and Isolation Panel [NHANES]     Frequency of Communication with Friends and Family: Three times a week     Frequency of Social Gatherings with Friends and Family: Three times a week     Attends Mandaen Services: 1 to 4 times per year     Active Member of Clubs or Organizations: No     Attends Club or Organization Meetings: Never     Marital Status:    Housing Stability: Low Risk  (6/15/2022)    Housing Stability Vital Sign     Unable to Pay for Housing in the Last Year: No     Number of Places Lived in the Last Year: 1     Unstable Housing in the Last Year: No       Medications:  No current facility-administered medications on file prior to encounter.     Current Outpatient Medications on File Prior to Encounter   Medication Sig Dispense Refill    atorvastatin (LIPITOR) 40 MG tablet Take 40 mg by mouth once daily.      b complex vitamins tablet Take 1 tablet by mouth once daily.      calcium acetate,phosphat bind, (PHOSLO) 667 mg tablet Take 667 mg by mouth 2 (two) times daily with meals.      diltiaZEM  "(CARDIZEM CD) 120 MG Cp24 Take 1 capsule (120 mg total) by mouth 2 (two) times a day.      dorzolamide-timolol 2-0.5% (COSOPT) 22.3-6.8 mg/mL ophthalmic solution Place 1 drop into both eyes 2 (two) times daily.      ELIQUIS 2.5 mg Tab Take 2.5 mg by mouth 2 (two) times daily.      latanoprost 0.005 % ophthalmic solution Place 1 drop into both eyes every evening.      loperamide (IMODIUM A-D) 2 mg Tab Take 2 mg by mouth 4 (four) times daily as needed.      ondansetron (ZOFRAN) 4 MG tablet Take 1 tablet (4 mg total) by mouth every 8 (eight) hours as needed for Nausea. 15 tablet 0    sotaloL (BETAPACE) 80 MG tablet Take 1 tablet (80 mg total) by mouth 2 (two) times daily. 60 tablet 0     Scheduled Meds:  Continuous Infusions:  PRN Meds:.    Allergies:  Cyclobenzaprine, Fish containing products, Peanut, and Tramadol    Past Family History:  Reviewed; refer to Hospitalist Admission Note    Review of Systems:  Review of Systems - All 14 systems reviewed and negative, except as noted in HPI    Physical Exam:    BP (!) 121/55   Pulse 81   Temp 98 °F (36.7 °C)   Resp (!) 22   Ht 5' 6" (1.676 m)   Wt 50.6 kg (111 lb 8.8 oz)   SpO2 100%   Breastfeeding No   BMI 18.01 kg/m²     General Appearance:    Ill appearing.     Head:    Normocephalic, without obvious abnormality, atraumatic   Eyes:    PER, conjunctiva/corneas clear, EOM's intact in both eyes        Throat:   Lips, mucosa, and tongue normal; teeth and gums normal   Back:     Symmetric, no curvature, ROM normal, no CVA tenderness   Lungs:     Clear to auscultation bilaterally, respirations unlabored   Chest wall:    No tenderness or deformity   Heart:    Regular rate and rhythm, S1 and S2 normal, no murmur, rub   or gallop   Abdomen:     Soft, non-tender, bowel sounds active all four quadrants,     no masses, no organomegaly   Extremities:   Diminished breath sounds   Pulses:   2+ and symmetric all extremities   MSK:   No joint or muscle swelling, tenderness or " deformity   Skin:   Skin color, texture, turgor normal, no rashes or lesions   Neurologic:   CNII-XII intact, normal strength and sensation       Throughout.  No flap     Results:  Lab Results   Component Value Date     12/06/2023    K 4.0 12/06/2023     12/06/2023    CO2 24 12/06/2023    BUN 45 (H) 12/06/2023    CREATININE 7.5 (H) 12/06/2023    CALCIUM 7.9 (L) 12/06/2023    ANIONGAP 12 12/06/2023    ESTGFRAFRICA 4.7 (A) 02/08/2022    EGFRNONAA 4.0 (A) 02/08/2022       Lab Results   Component Value Date    CALCIUM 7.9 (L) 12/06/2023    PHOS 3.3 12/06/2023       Recent Labs   Lab 12/06/23  0300   WBC 4.64   RBC 3.17*   HGB 10.0*   HCT 31.5*      MCV 99*   MCH 31.5*   MCHC 31.7*            I have personally reviewed pertinent radiological imaging and reports.    Patient care was time spent personally by me on the following activities:   Obtaining a history  Examination of patient.  Providing medical care at the patients bedside.  Developing a treatment plan with patient or surrogate and bedside caregivers  Ordering and reviewing laboratory studies, radiographic studies, pulse oximetry.  Ordering and performing treatments and interventions.  Evaluation of patient's response to treatment.  Discussions with consultants while on the unit and immediately available to the patient.  Re-evaluation of the patient's condition.  Documentation in the medical record.       Jimenez Valero MD  Nephrology  [facility] [place]  [phone]

## 2023-12-06 NOTE — PROGRESS NOTES
"[facility]  Progress Note    Patient Name: Tyra Isaac  MRN: [id number]  Patient Class: IP- Inpatient   Admission Date: 12/4/2023  Length of Stay: 1 days  Attending Physician: Juanito Ahn MD  Primary Care Provider: Kalpesh Goel MD        Subjective:     Principal Problem:COVID-19        HPI:  Tyra Isaac is an 83 year old female with a past medical history of ESRD (MWF), Afib, HTN, anemia, and CAD who presented with worsening fatigue, myalgias, constipation, and cough. She was supposed to go to HD today, but instead she presented to the ED. She was discovered to have COVID-19 on workup with no evidence of pneumonia on CXR. Nephrology was consulted from the ED HD. Hospital Medicine was consulted for admission.    Overview/Hospital Course:  Tyra Isaac is an 83 year old female with a past medical history of ESRD (MWF), Afib, HTN, and CAD who presented with acute COVID-19 forcing her to miss HD. She has no evidence of pneumonia and is not in respiratory failure. While undergoing HD 12/4, she developed a narrow QRS complex tachycardia thought to be either SVT of Afib with RVR which resolved after adenosine and administration of a diltiazem infusion. She is being monitored in the stepdown unit as she undergoes HD. Cardiology was consulted and started the patient on PO sotalol. She continues to observed.    Interval History: see "Hospital Course"    Review of Systems   Constitutional:  Positive for activity change and fatigue. Negative for fever.   Respiratory:  Positive for cough. Negative for shortness of breath.    Cardiovascular:  Negative for palpitations.   Musculoskeletal:  Positive for myalgias.     Objective:     Vital Signs (Most Recent):  Temp: 98 °F (36.7 °C) (12/06/23 1300)  Pulse: 81 (12/06/23 1300)  Resp: (!) 22 (12/06/23 1300)  BP: (!) 117/49 (12/06/23 1300)  SpO2: 100 % (12/06/23 1300) Vital Signs (24h Range):  Temp:  [97.7 °F (36.5 °C)-98.7 °F " (37.1 °C)] 98 °F (36.7 °C)  Pulse:  [53-83] 81  Resp:  [20-37] 22  SpO2:  [93 %-100 %] 100 %  BP: ()/(40-55) 117/49     Weight: 50.6 kg (111 lb 8.8 oz)  Body mass index is 18.01 kg/m².    Intake/Output Summary (Last 24 hours) at 12/6/2023 1358  Last data filed at 12/6/2023 1300  Gross per 24 hour   Intake 700 ml   Output 3794 ml   Net -3094 ml         Physical Exam  Vitals and nursing note reviewed.   Constitutional:       General: She is not in acute distress.     Appearance: She is ill-appearing.   HENT:      Head: Normocephalic and atraumatic.      Right Ear: External ear normal.      Left Ear: External ear normal.      Nose: Nose normal.      Mouth/Throat:      Mouth: Mucous membranes are moist.      Pharynx: Oropharynx is clear.   Eyes:      Extraocular Movements: Extraocular movements intact.      Conjunctiva/sclera: Conjunctivae normal.   Cardiovascular:      Rate and Rhythm: Normal rate and regular rhythm.      Pulses: Normal pulses.      Heart sounds: Normal heart sounds.      Comments: RUE AVF with thrill.  Pulmonary:      Effort: Pulmonary effort is normal.      Breath sounds: Normal breath sounds.      Comments: RA.  Abdominal:      General: Bowel sounds are normal. There is no distension.      Palpations: Abdomen is soft.      Tenderness: There is no abdominal tenderness.   Musculoskeletal:         General: Normal range of motion.      Cervical back: Normal range of motion and neck supple.   Skin:     General: Skin is warm and dry.   Neurological:      Mental Status: She is alert. Mental status is at baseline.   Psychiatric:         Mood and Affect: Mood normal.         Behavior: Behavior normal.             Significant Labs: All pertinent labs within the past 24 hours have been reviewed.    Significant Imaging: I have reviewed all pertinent imaging results/findings within the past 24 hours.    Assessment/Plan:      * COVID-19  No evidence of pneumonia and respiratory failure.  -COVID-19  precautions  -Trend inflammatory markers    Benign hypertension with ESRD (end-stage renal disease)  -Continue to monitor  -HD per Nephrology  -Renally dose medications    CAD s/p PCI  -Continue home statin  -Telemetry    Atrial fibrillation  Had possible RVR since resolved.  -Eliquis  -Sotalol  -Telemetry  -Cardiology following    Anemia of chronic disease  Stable.  -Trend Hgb with CBC      VTE Risk Mitigation (From admission, onward)           Ordered     apixaban tablet 2.5 mg  2 times daily         12/04/23 1145     IP VTE HIGH RISK PATIENT  Once         12/04/23 1145     Place sequential compression device  Until discontinued         12/04/23 1145     Reason for No Pharmacological VTE Prophylaxis  Once        Question:  Reasons:  Answer:  Already adequately anticoagulated on oral Anticoagulants    12/04/23 1145     heparin (porcine) injection 5,000 Units  As needed (PRN)         12/04/23 1145                    Discharge Planning   ILAN: 12/8/2023     Code Status: Full Code   Is the patient medically ready for discharge?:     Reason for patient still in hospital (select all that apply): Patient trending condition, Treatment, and Consult recommendations  Discharge Plan A: Home Health                  Juanito Ahn MD  Department of Hospital Medicine   [facility]

## 2023-12-06 NOTE — PROGRESS NOTES
12/06/23 1300   Vital Signs   Temp 98 °F (36.7 °C)   Pulse 81   Resp (!) 22   SpO2 100 %   BP (!) 117/49   Post-Hemodialysis Assessment   Rinseback Volume (mL) 250 mL   Blood Volume Processed (Liters) 65 L   Dialyzer Clearance Lightly streaked   Duration of Treatment 180 minutes   Additional Fluid Intake (mL) 200 mL   Total UF (mL) 2294 mL   Net Fluid Removal 1594   Patient Response to Treatment tolerated fair   Post-Treatment Weight 48.8 kg (107 lb 9.4 oz)   Treatment Weight Change -2.1   Arterial bleeding stop time (min) 10 min   Venous bleeding stop time (min) 10 min   Post-Hemodialysis Comments no problems post tx

## 2023-12-06 NOTE — NURSING
Consent and Hep b status verified, removed 1000 uf net, no issues with access, post thrill and bruit noted, patient stable throughout treatment, educated patient on hd treatment. Report given to AMY Lee     12/05/23 7187   Handoff Report   Received From Mary Carmen   Given To Rosa   Vital Signs   Temp 98.4 °F (36.9 °C)   Temp Source Oral   Pulse 69   Heart Rate Source Monitor   Resp (!) 35   SpO2 96 %   Pulse Oximetry Type Continuous   Device (Oxygen Therapy) room air   BP (!) 116/53   BP Location Left arm   BP Method Automatic   Patient Position Lying   Assessments (Pre/Post)   Consent Obtained yes   Safety vein preservation armband present   Date Hepatitis Profile Obtained 11/11/23   Blood Liters Processed (BLP) 48   Transport Modality bed   Level of Consciousness (AVPU) responds to voice   Dialyzer Clearance mildly streaked   Pain   Preferred Pain Scale number (Numeric Rating Pain Scale)   Comfort/Acceptable Pain Level 0   Pain Rating (0-10): Rest 0   Pain Rating (0-10): Activity 0   Pain Management Interventions quiet environment facilitated;pillow support provided;position adjusted   Pain/Comfort Interventions   Fever Reduction/Comfort Measures lightweight clothing;lightweight bedding   Pre-Hemodialysis Assessment   Additional Dialysis Information Needed Yes   Patient Status Departed   Treatment Consent Verified Yes   Treatment Status Completed        Hemodialysis AV Fistula Right upper arm   No Placement Date or Time found.   Present Prior to Hospital Arrival?: Yes  Location: Right upper arm   Site Assessment Clean;Dry;Intact   Patency Present;Thrill;Bruit   Status Deaccessed   Dressing Intervention First dressing   Dressing Status Clean;Dry;Intact   Site Condition No complications   Dressing Gauze   Post-Hemodialysis Assessment   Rinseback Volume (mL) 250 mL   Blood Volume Processed (Liters) 48 L   Dialyzer Clearance Lightly streaked   Duration of Treatment 120 minutes   Additional Fluid Intake (mL) 500 mL    Total UF (mL) 1500 mL   Net Fluid Removal 1000   Patient Response to Treatment [email or web address]   Post-Treatment Weight 49.6 kg (109 lb 5.6 oz)   Treatment Weight Change -1   Arterial bleeding stop time (min) 5 min   Venous bleeding stop time (min) 5 min   Post-Hemodialysis Comments stable

## 2023-12-06 NOTE — PLAN OF CARE
Problem: Adult Inpatient Plan of Care  Goal: Plan of Care Review  Outcome: Ongoing, Progressing  Goal: Patient-Specific Goal (Individualized)  Outcome: Ongoing, Progressing  Goal: Absence of Hospital-Acquired Illness or Injury  Outcome: Ongoing, Progressing  Goal: Optimal Comfort and Wellbeing  Outcome: Ongoing, Progressing  Goal: Readiness for Transition of Care  Outcome: Ongoing, Progressing     Problem: Device-Related Complication Risk (Hemodialysis)  Goal: Safe, Effective Therapy Delivery  Outcome: Ongoing, Progressing     Problem: Hemodynamic Instability (Hemodialysis)  Goal: Effective Tissue Perfusion  Outcome: Ongoing, Progressing     Problem: Infection (Hemodialysis)  Goal: Absence of Infection Signs and Symptoms  Outcome: Ongoing, Progressing     Problem: Fall Injury Risk  Goal: Absence of Fall and Fall-Related Injury  Outcome: Ongoing, Progressing     Problem: Skin Injury Risk Increased  Goal: Skin Health and Integrity  Outcome: Ongoing, Progressing     Problem: Oral Intake Inadequate  Goal: Improved Oral Intake  Outcome: Ongoing, Progressing

## 2023-12-07 LAB
ALBUMIN SERPL BCP-MCNC: 3.4 G/DL (ref 3.5–5.2)
ALP SERPL-CCNC: 62 U/L (ref 55–135)
ALT SERPL W/O P-5'-P-CCNC: 16 U/L (ref 10–44)
ANION GAP SERPL CALC-SCNC: 11 MMOL/L (ref 8–16)
AST SERPL-CCNC: 25 U/L (ref 10–40)
BASOPHILS # BLD AUTO: 0.02 K/UL (ref 0–0.2)
BASOPHILS NFR BLD: 0.4 % (ref 0–1.9)
BILIRUB SERPL-MCNC: 0.6 MG/DL (ref 0.1–1)
BUN SERPL-MCNC: 32 MG/DL (ref 8–23)
CALCIUM SERPL-MCNC: 9.1 MG/DL (ref 8.7–10.5)
CHLORIDE SERPL-SCNC: 103 MMOL/L (ref 95–110)
CO2 SERPL-SCNC: 25 MMOL/L (ref 23–29)
CREAT SERPL-MCNC: 5.8 MG/DL (ref 0.5–1.4)
DIFFERENTIAL METHOD BLD: ABNORMAL
EOSINOPHIL # BLD AUTO: 0.3 K/UL (ref 0–0.5)
EOSINOPHIL NFR BLD: 5.8 % (ref 0–8)
ERYTHROCYTE [DISTWIDTH] IN BLOOD BY AUTOMATED COUNT: 15.5 % (ref 11.5–14.5)
EST. GFR  (NO RACE VARIABLE): 6.8 ML/MIN/1.73 M^2
GLUCOSE SERPL-MCNC: 103 MG/DL (ref 70–110)
GLUCOSE SERPL-MCNC: 121 MG/DL (ref 70–110)
HCT VFR BLD AUTO: 35.5 % (ref 37–48.5)
HGB BLD-MCNC: 11.1 G/DL (ref 12–16)
IMM GRANULOCYTES # BLD AUTO: 0.02 K/UL (ref 0–0.04)
IMM GRANULOCYTES NFR BLD AUTO: 0.4 % (ref 0–0.5)
LYMPHOCYTES # BLD AUTO: 1.1 K/UL (ref 1–4.8)
LYMPHOCYTES NFR BLD: 19.3 % (ref 18–48)
MAGNESIUM SERPL-MCNC: 2.1 MG/DL (ref 1.6–2.6)
MCH RBC QN AUTO: 31.6 PG (ref 27–31)
MCHC RBC AUTO-ENTMCNC: 31.3 G/DL (ref 32–36)
MCV RBC AUTO: 101 FL (ref 82–98)
MONOCYTES # BLD AUTO: 0.8 K/UL (ref 0.3–1)
MONOCYTES NFR BLD: 13.7 % (ref 4–15)
NEUTROPHILS # BLD AUTO: 3.4 K/UL (ref 1.8–7.7)
NEUTROPHILS NFR BLD: 60.4 % (ref 38–73)
NRBC BLD-RTO: 0 /100 WBC
PHOSPHATE SERPL-MCNC: 2.3 MG/DL (ref 2.7–4.5)
PLATELET # BLD AUTO: 198 K/UL (ref 150–450)
PMV BLD AUTO: 10.7 FL (ref 9.2–12.9)
POTASSIUM SERPL-SCNC: 4.6 MMOL/L (ref 3.5–5.1)
PROT SERPL-MCNC: 7.2 G/DL (ref 6–8.4)
RBC # BLD AUTO: 3.51 M/UL (ref 4–5.4)
SODIUM SERPL-SCNC: 139 MMOL/L (ref 136–145)
WBC # BLD AUTO: 5.69 K/UL (ref 3.9–12.7)

## 2023-12-07 PROCEDURE — 63600175 PHARM REV CODE 636 W HCPCS: Performed by: STUDENT IN AN ORGANIZED HEALTH CARE EDUCATION/TRAINING PROGRAM

## 2023-12-07 PROCEDURE — 80053 COMPREHEN METABOLIC PANEL: CPT | Performed by: STUDENT IN AN ORGANIZED HEALTH CARE EDUCATION/TRAINING PROGRAM

## 2023-12-07 PROCEDURE — 93010 ELECTROCARDIOGRAM REPORT: CPT | Mod: ,,, | Performed by: GENERAL PRACTICE

## 2023-12-07 PROCEDURE — 21000000 HC CCU ICU ROOM CHARGE

## 2023-12-07 PROCEDURE — 25000003 PHARM REV CODE 250: Performed by: NURSE PRACTITIONER

## 2023-12-07 PROCEDURE — 83735 ASSAY OF MAGNESIUM: CPT | Performed by: STUDENT IN AN ORGANIZED HEALTH CARE EDUCATION/TRAINING PROGRAM

## 2023-12-07 PROCEDURE — 36415 COLL VENOUS BLD VENIPUNCTURE: CPT | Performed by: STUDENT IN AN ORGANIZED HEALTH CARE EDUCATION/TRAINING PROGRAM

## 2023-12-07 PROCEDURE — 25000003 PHARM REV CODE 250: Performed by: STUDENT IN AN ORGANIZED HEALTH CARE EDUCATION/TRAINING PROGRAM

## 2023-12-07 PROCEDURE — 94799 UNLISTED PULMONARY SVC/PX: CPT

## 2023-12-07 PROCEDURE — 99233 SBSQ HOSP IP/OBS HIGH 50: CPT | Mod: ,,, | Performed by: STUDENT IN AN ORGANIZED HEALTH CARE EDUCATION/TRAINING PROGRAM

## 2023-12-07 PROCEDURE — 94761 N-INVAS EAR/PLS OXIMETRY MLT: CPT

## 2023-12-07 PROCEDURE — 63600175 PHARM REV CODE 636 W HCPCS

## 2023-12-07 PROCEDURE — 84100 ASSAY OF PHOSPHORUS: CPT | Performed by: STUDENT IN AN ORGANIZED HEALTH CARE EDUCATION/TRAINING PROGRAM

## 2023-12-07 PROCEDURE — 85025 COMPLETE CBC W/AUTO DIFF WBC: CPT | Performed by: STUDENT IN AN ORGANIZED HEALTH CARE EDUCATION/TRAINING PROGRAM

## 2023-12-07 PROCEDURE — 99900035 HC TECH TIME PER 15 MIN (STAT)

## 2023-12-07 PROCEDURE — 93005 ELECTROCARDIOGRAM TRACING: CPT | Performed by: GENERAL PRACTICE

## 2023-12-07 PROCEDURE — 99900031 HC PATIENT EDUCATION (STAT)

## 2023-12-07 RX ORDER — MIDAZOLAM HYDROCHLORIDE 1 MG/ML
INJECTION INTRAMUSCULAR; INTRAVENOUS
Status: DISPENSED
Start: 2023-12-07 | End: 2023-12-07

## 2023-12-07 RX ORDER — PHENYLEPHRINE HCL IN 0.9% NACL 20MG/250ML
0-5 PLASTIC BAG, INJECTION (ML) INTRAVENOUS CONTINUOUS
Status: DISCONTINUED | OUTPATIENT
Start: 2023-12-07 | End: 2023-12-07

## 2023-12-07 RX ORDER — DILTIAZEM HYDROCHLORIDE 30 MG/1
30 TABLET, FILM COATED ORAL EVERY 8 HOURS
Status: DISCONTINUED | OUTPATIENT
Start: 2023-12-07 | End: 2023-12-08

## 2023-12-07 RX ORDER — DIGOXIN 0.25 MG/ML
250 INJECTION INTRAMUSCULAR; INTRAVENOUS ONCE
Status: COMPLETED | OUTPATIENT
Start: 2023-12-07 | End: 2023-12-07

## 2023-12-07 RX ORDER — ADENOSINE 3 MG/ML
INJECTION, SOLUTION INTRAVENOUS
Status: DISPENSED
Start: 2023-12-07 | End: 2023-12-07

## 2023-12-07 RX ADMIN — SOTALOL HYDROCHLORIDE 80 MG: 80 TABLET ORAL at 09:12

## 2023-12-07 RX ADMIN — LATANOPROST 1 DROP: 50 SOLUTION OPHTHALMIC at 09:12

## 2023-12-07 RX ADMIN — DILTIAZEM HYDROCHLORIDE 30 MG: 30 TABLET, FILM COATED ORAL at 03:12

## 2023-12-07 RX ADMIN — DILTIAZEM HYDROCHLORIDE 30 MG: 30 TABLET, FILM COATED ORAL at 09:12

## 2023-12-07 RX ADMIN — SENNOSIDES AND DOCUSATE SODIUM 1 TABLET: 8.6; 5 TABLET ORAL at 09:12

## 2023-12-07 RX ADMIN — ATORVASTATIN CALCIUM 40 MG: 40 TABLET, FILM COATED ORAL at 09:12

## 2023-12-07 RX ADMIN — Medication 6 MG: at 09:12

## 2023-12-07 RX ADMIN — AMIODARONE HYDROCHLORIDE 1 MG/MIN: 1.8 INJECTION, SOLUTION INTRAVENOUS at 11:12

## 2023-12-07 RX ADMIN — MUPIROCIN 1 G: 20 OINTMENT TOPICAL at 09:12

## 2023-12-07 RX ADMIN — APIXABAN 2.5 MG: 2.5 TABLET, FILM COATED ORAL at 09:12

## 2023-12-07 RX ADMIN — SODIUM CHLORIDE 250 ML: 0.9 INJECTION, SOLUTION INTRAVENOUS at 12:12

## 2023-12-07 RX ADMIN — DORZOLAMIDE HYDROCHLORIDE AND TIMOLOL MALEATE 1 DROP: 20; 5 SOLUTION/ DROPS OPHTHALMIC at 09:12

## 2023-12-07 RX ADMIN — AMIODARONE HYDROCHLORIDE 360 MG: 1.8 INJECTION, SOLUTION INTRAVENOUS at 11:12

## 2023-12-07 RX ADMIN — SODIUM CHLORIDE 1000 ML: 0.9 INJECTION, SOLUTION INTRAVENOUS at 10:12

## 2023-12-07 RX ADMIN — AMIODARONE HYDROCHLORIDE 150 MG: 1.5 INJECTION, SOLUTION INTRAVENOUS at 11:12

## 2023-12-07 RX ADMIN — CALCIUM ACETATE 667 MG: 667 CAPSULE ORAL at 09:12

## 2023-12-07 RX ADMIN — DIGOXIN 250 MCG: 0.25 INJECTION INTRAMUSCULAR; INTRAVENOUS at 11:12

## 2023-12-07 NOTE — PT/OT/SLP PROGRESS
Physical Therapy      Patient Name:  Tyra Isaac   MRN:  [id number]    Patient not seen today secondary to Nurse/ NAHUN hold (calling for STAT EKG.).Low BP and now in A-fib w RVR. Will follow-up tomorrow.

## 2023-12-07 NOTE — SUBJECTIVE & OBJECTIVE
"Interval History: see "Hospital Course"    Review of Systems   Constitutional:  Positive for activity change and fatigue. Negative for fever.   Respiratory:  Positive for cough. Negative for shortness of breath.    Cardiovascular:  Positive for palpitations. Negative for chest pain.   Musculoskeletal:  Positive for myalgias.   Neurological:  Positive for dizziness and light-headedness.     Objective:     Vital Signs (Most Recent):  Temp: 97.9 °F (36.6 °C) (12/07/23 0701)  Pulse: 61 (12/07/23 1250)  Resp: 19 (12/07/23 1250)  BP: (!) 92/49 (12/07/23 1250)  SpO2: 99 % (12/07/23 1250) Vital Signs (24h Range):  Temp:  [97.6 °F (36.4 °C)-98.1 °F (36.7 °C)] 97.9 °F (36.6 °C)  Pulse:  [] 61  Resp:  [13-29] 19  SpO2:  [95 %-100 %] 99 %  BP: ()/(40-65) 92/49     Weight: 50.6 kg (111 lb 8.8 oz)  Body mass index is 18.01 kg/m².    Intake/Output Summary (Last 24 hours) at 12/7/2023 1358  Last data filed at 12/7/2023 0844  Gross per 24 hour   Intake 120 ml   Output --   Net 120 ml         Physical Exam  Vitals and nursing note reviewed.   Constitutional:       General: She is not in acute distress.     Appearance: She is ill-appearing.   HENT:      Head: Normocephalic and atraumatic.      Right Ear: External ear normal.      Left Ear: External ear normal.      Nose: Nose normal.      Mouth/Throat:      Mouth: Mucous membranes are moist.      Pharynx: Oropharynx is clear.   Eyes:      Extraocular Movements: Extraocular movements intact.      Conjunctiva/sclera: Conjunctivae normal.   Cardiovascular:      Rate and Rhythm: Tachycardia present. Rhythm irregular.      Pulses: Normal pulses.      Heart sounds: Normal heart sounds.      Comments: RUE AVF with thrill.  Pulmonary:      Effort: Pulmonary effort is normal.      Breath sounds: Normal breath sounds.      Comments: RA.  Abdominal:      General: Bowel sounds are normal. There is no distension.      Palpations: Abdomen is soft.      Tenderness: There is no abdominal " tenderness.   Musculoskeletal:         General: Normal range of motion.      Cervical back: Normal range of motion and neck supple.   Skin:     General: Skin is warm and dry.   Neurological:      Mental Status: She is alert and oriented to person, place, and time.   Psychiatric:         Mood and Affect: Mood normal.         Behavior: Behavior normal.             Significant Labs: All pertinent labs within the past 24 hours have been reviewed.    Significant Imaging: I have reviewed all pertinent imaging results/findings within the past 24 hours.

## 2023-12-07 NOTE — NURSING
Pt rhythm converted from Afib to SR 60s with SBP 90s. Notified cardiology MD and hospital MD. Amiodarone bolus complete and drip on hold at present time. NS 250ml bolus in progress. Pt resting comfortably with eyes closed, with respirations even and unlabored at present time.

## 2023-12-07 NOTE — NURSING
Noted on monitor pt's HR 150s. When I entered the room the patient was awake and alert, but stated she was experiencing some lightheadedness and dizziness. Her BP was noted to be 72/42 and upon recheck remained 70s/40s. She was placed in trendelenburg, a stat EKG was ordered, and hospital MD and cardiology MD notified. EKG revealed pt had converted to AFIB RVR. Hospital MD and cardiology MD arrived to pt's bedside. NS bolus, digoxin 250mcg IV given and Amiodarone bolus initiated per MD order. This nurse remaining at bedside to monitor pt.

## 2023-12-07 NOTE — CARE UPDATE
12/07/23 0753   Patient Assessment/Suction   Level of Consciousness (AVPU) alert   Respiratory Effort Normal;Unlabored   Expansion/Accessory Muscles/Retractions no use of accessory muscles   PRE-TX-O2   Device (Oxygen Therapy) room air   SpO2 100 %   Pulse Oximetry Type Continuous   $ Pulse Oximetry - Multiple Charge Pulse Oximetry - Multiple   Pulse 68   Resp (!) 22   Education   $ Education DME Oxygen;15 min   Respiratory Evaluation   $ Care Plan Tech Time 15 min   $ Eval/Re-eval Charges Re-evaluation   Evaluation For Re-Eval 3 day

## 2023-12-07 NOTE — PROGRESS NOTES
"[facility]  Department of Cardiology  Progress Note      PATIENT NAME: Tyra Isaac  MRN: [id number]  TODAY'S DATE: 12/07/2023  ADMIT DATE: 12/4/2023                          CONSULT REQUESTED BY: Juanito Ahn MD    SUBJECTIVE     PRINCIPAL PROBLEM: Atrial fibrillation      REASON FOR CONSULT:  SVT    INTERVAL HISTORY:  12/7/23:  Pt had episode of RVR 150s and hypotension, SBP 60s. IVFs given. IV digoxin and IV amiodarone 150 mg IV x 10 mins  Pt states she is feeling better now. Appears weak, coughing    12/6/23:  Pt has not any further episodes of RVR. She tolerated HD today w/o abnormal rhythm or tachycardia.     HPI: Pt is 82 yo female admitted for covid 19 as below. Cardiology consulted for SVT vs AF RVR during dialysis yesterday evening around 7 pm.    Pt is resting in bed. Denies chest pain or dyspnea on room air. Reports nausea and vomiting and generalized malaise    Hospitalist HPI: "Tyra Isaac is an 83 year old female with a past medical history of ESRD (MWF), Afib, HTN, anemia, and CAD who presented with worsening fatigue, myalgias, constipation, and cough. She was supposed to go to HD today, but instead she presented to the ED. She was discovered to have COVID-19 on workup with no evidence of pneumonia on CXR. Nephrology was consulted from the ED HD. Hospital Medicine was consulted for admission."     Notes from 12/4/23: 3:58 pm-   Patient was undergoing dialysis at the bedside when her heart rate began to increase on telemetry. EKG showed SVT. She received 6 mg followed by 12 mg of adenosine without improvement in her heart rate. Cardiology was consulted and recommended a diltiazem bolus and infusion. The patient will be transferred to the stepdown unit.     Nurse reported pt converted to sinus zane around 11pm last night. She has been sinus zane since conversion.    Review of patient's allergies indicates:   Allergen Reactions    Cyclobenzaprine     Fish containing " products Hives    Peanut Other (See Comments)    Tramadol Itching       Past Medical History:   Diagnosis Date    A-fib     Anxiety     Depression     Disorder of kidney and ureter     Encephalopathy acute 1/1/2018    End stage kidney disease 6/17/2017    Gout     Hyperlipidemia     Hypertension     Moderate episode of recurrent major depressive disorder 1/17/2018    Nephropathy hypertensive, 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    Osteopenia of multiple sites 3/9/2018    Based upon bone density measurements. Patient also has chronic kidney disease.    Stroke 11/2016     Past Surgical History:   Procedure Laterality Date    ANGIOGRAM, CORONARY, WITH LEFT HEART CATHETERIZATION N/A 2/7/2022    Procedure: Angiogram, Coronary, with Left Heart Cath;  Surgeon: Gnio Leal MD;  Location: [facility] CATH/EP LAB;  Service: Cardiology;  Laterality: N/A;    CARDIAC SURGERY      stents    EYE SURGERY      WRIST SURGERY       Social History     Tobacco Use    Smoking status: Never    Smokeless tobacco: Never   Substance Use Topics    Alcohol use: No    Drug use: No        REVIEW OF SYSTEMS  Negative except as mentioned in HPI    OBJECTIVE     VITAL SIGNS (Most Recent)  Temp: 98.1 °F (36.7 °C) (12/07/23 1500)  Pulse: (!) 57 (12/07/23 1500)  Resp: 20 (12/07/23 1500)  BP: (!) 99/54 (12/07/23 1500)  SpO2: 99 % (12/07/23 1500)    VENTILATION STATUS  Resp: 20 (12/07/23 1500)  SpO2: 99 % (12/07/23 1500)           I & O (Last 24H):  Intake/Output Summary (Last 24 hours) at 12/7/2023 1546  Last data filed at 12/7/2023 1525  Gross per 24 hour   Intake 1707.85 ml   Output --   Net 1707.85 ml         WEIGHTS  Wt Readings from Last 3 Encounters:   12/04/23 2220 50.6 kg (111 lb 8.8 oz)   12/04/23 0636 48.1 kg (106 lb)   11/29/23 1040 48.5 kg (107 lb)   11/09/23 2243 52.2 kg (115 lb 1.3 oz)   11/09/23 1651 52.2  kg (115 lb)       PHYSICAL EXAM    GENERAL: fatigued elderly female resting in bed in no apparent distress.  HEENT: Normocephalic.    NECK: No JVD.   CARDIAC: Regular rate and rhythm. S1 is normal.S2 is normal.No gallops, clicks or murmurs noted at this time.  CHEST ANATOMY: normal.   LUNGS: Clear to auscultation. No wheezing or rhonchi..   ABDOMEN: Soft, flat, thin, non tender. Normal bowel sounds.    EXTREMITIES: No edema  CENTRAL NERVOUS SYSTEM: AAO x 3  SKIN: No rash     HOME MEDICATIONS:  No current facility-administered medications on file prior to encounter.     Current Outpatient Medications on File Prior to Encounter   Medication Sig Dispense Refill    atorvastatin (LIPITOR) 40 MG tablet Take 40 mg by mouth once daily.      b complex vitamins tablet Take 1 tablet by mouth once daily.      calcium acetate,phosphat bind, (PHOSLO) 667 mg tablet Take 667 mg by mouth 2 (two) times daily with meals.      diltiaZEM (CARDIZEM CD) 120 MG Cp24 Take 1 capsule (120 mg total) by mouth 2 (two) times a day.      dorzolamide-timolol 2-0.5% (COSOPT) 22.3-6.8 mg/mL ophthalmic solution Place 1 drop into both eyes 2 (two) times daily.      ELIQUIS 2.5 mg Tab Take 2.5 mg by mouth 2 (two) times daily.      latanoprost 0.005 % ophthalmic solution Place 1 drop into both eyes every evening.      loperamide (IMODIUM A-D) 2 mg Tab Take 2 mg by mouth 4 (four) times daily as needed.      ondansetron (ZOFRAN) 4 MG tablet Take 1 tablet (4 mg total) by mouth every 8 (eight) hours as needed for Nausea. 15 tablet 0    sotaloL (BETAPACE) 80 MG tablet Take 1 tablet (80 mg total) by mouth 2 (two) times daily. 60 tablet 0       SCHEDULED MEDS:   adenosine        apixaban  2.5 mg Oral BID    atorvastatin  40 mg Oral Daily    calcium acetate(phosphat bind)  667 mg Oral Daily    diltiaZEM  30 mg Oral Q8H    dorzolamide-timolol 2-0.5%  1 drop Both Eyes BID    epoetin rosy-epbx  50 Units/kg Intravenous Every Mon, Wed, Fri    latanoprost  1 drop Both  "Eyes QHS    midazolam        mupirocin   Nasal BID    senna-docusate 8.6-50 mg  1 tablet Oral BID    sotaloL  80 mg Oral BID       CONTINUOUS INFUSIONS:   amiodarone in dextrose 5% Stopped (12/07/23 1300)    phenylephrine Stopped (12/07/23 1245)       PRN MEDS:sodium chloride 0.9%, acetaminophen, adenosine, heparin (porcine), LIDOcaine-prilocaine, melatonin, midazolam, naloxone, ondansetron, sodium chloride 0.9%, sodium chloride 0.9%    LABS AND DIAGNOSTICS     CBC LAST 3 DAYS  Recent Labs   Lab 12/05/23  0437 12/06/23  0300 12/07/23  0442   WBC 5.24 4.64 5.69   RBC 3.13* 3.17* 3.51*   HGB 9.7* 10.0* 11.1*   HCT 30.8* 31.5* 35.5*   MCV 98 99* 101*   MCH 31.0 31.5* 31.6*   MCHC 31.5* 31.7* 31.3*   RDW 15.5* 15.4* 15.5*   * 163 198   MPV 10.8 11.1 10.7   GRAN 64.7  3.4 70.5  3.3 60.4  3.4   LYMPH 15.6*  0.8* 14.0*  0.7* 19.3  1.1   MONO 18.5*  1.0 10.6  0.5 13.7  0.8   BASO 0.02 0.01 0.02   NRBC 0 0 0         COAGULATION LAST 3 DAYS  No results for input(s): "LABPT", "INR", "APTT" in the last 168 hours.    CHEMISTRY LAST 3 DAYS  Recent Labs   Lab 12/05/23  0437 12/06/23  0300 12/07/23  0442    139 139   K 5.1 4.0 4.6    103 103   CO2 26 24 25   ANIONGAP 14 12 11   BUN 56* 45* 32*   CREATININE 9.2* 7.5* 5.8*   GLU 90 105 103   CALCIUM 8.1* 7.9* 9.1   MG 2.0 2.0 2.1   ALBUMIN 3.3* 3.1* 3.4*   PROT 6.7 6.5 7.2   ALKPHOS 59 53* 62   ALT 11 12 16   AST 24 23 25   BILITOT 0.8 0.7 0.6         CARDIAC PROFILE LAST 3 DAYS  Recent Labs   Lab 12/04/23  0704 12/05/23  0437   BNP 2,609*  --    LDH  --  197         ENDOCRINE LAST 3 DAYS  No results for input(s): "TSH", "PROCAL" in the last 168 hours.    LAST ARTERIAL BLOOD GAS  ABG  No results for input(s): "PH", "PO2", "PCO2", "HCO3", "BE" in the last 168 hours.    LAST 7 DAYS MICROBIOLOGY   Microbiology Results (last 7 days)       Procedure Component Value Units Date/Time    Blood culture [[id number]] Collected: 12/04/23 0709    Order Status: Completed " Specimen: Blood Updated: 12/07/23 0832     Blood Culture, Routine No Growth to date      No Growth to date      No Growth to date      No Growth to date    Narrative:      Collection has been rescheduled by [email or web address] at 12/04/2023 07:14 Reason:   Done per phil  Collection has been rescheduled by [email or web address] at 12/04/2023 07:14 Reason:   Done per phil    Blood culture [[id number]] Collected: 12/04/23 0713    Order Status: Completed Specimen: Blood Updated: 12/07/23 0832     Blood Culture, Routine No Growth to date      No Growth to date      No Growth to date      No Growth to date    Narrative:      Collection has been rescheduled by [email or web address] at 12/04/2023 07:14 Reason:   Done per phil  Collection has been rescheduled by [email or web address] at 12/04/2023 07:14 Reason:   Done per phil    Respiratory Infection Panel (PCR), Nasopharyngeal [[id number]]  (Abnormal) Collected: 12/04/23 0645    Order Status: Completed Updated: 12/04/23 0947     Respiratory Infection Panel Source NP swab     Adenovirus Not Detected     Coronavirus 229E, Common Cold Virus Not Detected     Coronavirus HKU1, Common Cold Virus Not Detected     Coronavirus NL63, Common Cold Virus Not Detected     Coronavirus OC43, Common Cold Virus Not Detected     Comment: Coronavirus strains 229E, HKU1, NL63, and OC43 can cause the common   cold   and are not associated with the respiratory disease outbreak caused   by  the COVID-19 (SARS-CoV-2 novel Coronavirus) strain.           SARS-CoV2 (COVID-19) Qualitative PCR Detected     Human Metapneumovirus Not Detected     Human Rhinovirus/Enterovirus Not Detected     Influenza A (subtypes H1, H1-2009,H3) Not Detected     Influenza B Not Detected     Parainfluenza Virus 1 Not Detected     Parainfluenza Virus 2 Not Detected     Parainfluenza Virus 3 Not Detected     Parainfluenza Virus 4 Not Detected     Respiratory Syncytial Virus Not Detected     Bordetella Parapertussis ([id number]) Not Detected     Bordetella pertussis (ptxP) Not  Detected     Chlamydia pneumoniae Not Detected     Mycoplasma pneumoniae Not Detected     Comment: Respiratory Infection Panel testing performed by Multiplex PCR.       Narrative:      Specimen Source->Nasopharyngeal Swab    Resp Viral Panel PCR, Peds Under 7 Months Nasopharyngeal Swab [[id number]] Collected: 12/04/23 0645    Order Status: Canceled             MOST RECENT IMAGING  X-Ray Chest AP Portable  XR CHEST 1 VIEW    CLINICAL HISTORY:  83 years Female Chest Pain    COMPARISON: November 9, 2023    FINDINGS: Cardiac silhouette size is within normal limits. Atherosclerotic calcification of the aorta. No confluent airspace disease. No pleural effusion or pneumothorax. No acute osseous abnormality.    IMPRESSION:    No acute pulmonary pathology.    Electronically signed by:  Aleksandar Castillo MD  12/04/2023 08:16 AM Email Data Source Workstation: [id number]      ECHOCARDIOGRAM RESULTS (last 5)  Results for orders placed during the hospital encounter of 03/20/23    Echo    Interpretation Summary  · The left ventricle is normal in size with moderate concentric hypertrophy and normal systolic function.  · The estimated ejection fraction is 57%.  · Normal left ventricular diastolic function.  · Normal right ventricular size with normal right ventricular systolic function.  · Moderate to severe tricuspid regurgitation.  · Mild-to-moderate mitral regurgitation.  · Moderate left atrial enlargement.  · Mild right atrial enlargement.  · There is mild pulmonary hypertension.      Results for orders placed during the hospital encounter of 03/06/23    Echo    Interpretation Summary  · The left ventricle is normal in size with normal systolic function.  · The estimated ejection fraction is 60%.  · Grade II left ventricular diastolic dysfunction.  · Normal right ventricular size with normal right ventricular systolic function.  · Moderate left atrial enlargement.  · Mild-to-moderate mitral regurgitation.  · Moderate to severe tricuspid  regurgitation.  · There is mild pulmonary hypertension.  · There is mild aortic valve stenosis.  · Aortic valve area is 1.78 cm2; peak velocity is 2.01 m/s; mean gradient is 9 mmHg.      Results for orders placed during the hospital encounter of 01/26/22    Echo    Interpretation Summary  · The left ventricle is normal in size with mild concentric hypertrophy and normal systolic function.  · The estimated ejection fraction is 65%.  · Grade II left ventricular diastolic dysfunction.  · Atrial fibrillation not observed.  · Normal right ventricular size with normal right ventricular systolic function.  · Moderate to severe left atrial enlargement.  · Mild right atrial enlargement.  · Mild mitral regurgitation.  · Mild to moderate tricuspid regurgitation.  · Normal central venous pressure (3 mmHg).  · The estimated PA systolic pressure is 36 mmHg.  · Mildly elevated gradient accross mitral valve of around 6 mmHg at HR of 64 bpm. MVA by pressure half time is normal, however this can be inaccurate.      Results for orders placed during the hospital encounter of 03/03/21    Echo Color Flow Doppler? Yes    Interpretation Summary  · The left ventricle is small with concentric remodeling and normal systolic function. The estimated ejection fraction is 63%  · The estimated PA systolic pressure is 38 mmHg.  · Grade II left ventricular diastolic dysfunction.  · Normal right ventricular size with normal right ventricular systolic function.  · Mild-to-moderate mitral regurgitation.  · Mild to moderate tricuspid regurgitation.  · Normal central venous pressure (3 mmHg).  · The patient converted from atrial fibrillation to normal sinus rhythm 03/03/2021      Results for orders placed during the hospital encounter of 12/13/19    Echo Color Flow Doppler? Yes    Interpretation Summary  · Mild concentric left ventricular hypertrophy.  · Normal left ventricular systolic function. The estimated ejection fraction is 70%  · Grade II  (moderate) left ventricular diastolic dysfunction consistent with pseudonormalization.  · The estimated PA systolic pressure is 37 mm Hg  · No wall motion abnormalities.  · Normal right ventricular systolic function.  · Normal central venous pressure (3 mm Hg).  · Mild mitral sclerosis.  · Mild mitral regurgitation.  · Mild to moderate tricuspid regurgitation.      CURRENT/PREVIOUS VISIT EKG  Results for orders placed or performed during the hospital encounter of 12/04/23   EKG 12-lead    Collection Time: 12/07/23 12:57 PM    Narrative    Test Reason : I48.0,    Vent. Rate : 060 BPM     Atrial Rate : 060 BPM     P-R Int : 156 ms          QRS Dur : 070 ms      QT Int : 494 ms       P-R-T Axes : 036 012 002 degrees     QTc Int : 494 ms    Normal sinus rhythm  Nonspecific ST abnormality  Prolonged QT  Abnormal ECG  When compared with ECG of 07-DEC-2023 10:47,  Sinus rhythm has replaced Atrial fibrillation  Vent. rate has decreased BY  83 BPM  Questionable change in QRS duration    Referred By: [email or web address]   SELF           Confirmed By:            ASSESSMENT/PLAN:     Active Hospital Problems    Diagnosis    *Atrial fibrillation    COVID-19    CAD s/p PCI    Anemia of chronic disease    Benign hypertension with ESRD (end-stage renal disease)     BP slightly elevated1.21.15 BP elelvated. Took meds in [facility] will increase amlodipine to 10 mg per day. New prescription sent to the pharmacy. She will double the existing prescription. She will continue to monitor her blood pressures.         ASSESSMENT & PLAN:   Afib RVR  CAD  PAF  HTN  ESRD  Anemia  COVID 19      RECOMMENDATIONS:  Pt had mild HS troponin elevation on admit that has trended down, likely demand ischemia from RVR.  Pt denies chest pain or dyspnea.  Stress test in March 2023 that was negative for RI    Pt had recurrence of RVR.   She had effective response to IVFs, IV digoxin and IV amiodarone bolus  Continue Sotalol 80 mg BID for now  Start Cardizem 30 mg Q 8  hrs  Continue Eliquis for anticoagulation  Will need outpatient referral to EP    Supportive therapies/treatment for COVID       Tsering Bird NP  [facility]  Department of Cardiology  Date of Service: 12/07/2023

## 2023-12-07 NOTE — PLAN OF CARE
12/06/23 2054   Patient Assessment/Suction   Level of Consciousness (AVPU) alert   Respiratory Effort Normal;Unlabored   Rhythm/Pattern, Respiratory pattern regular;unlabored   PRE-TX-O2   Device (Oxygen Therapy) room air   SpO2 96 %   Pulse Oximetry Type Continuous   $ Pulse Oximetry - Multiple Charge Pulse Oximetry - Multiple   Pulse 75   Resp 20   Education   $ Education 15 min   Respiratory Evaluation   $ Care Plan Tech Time 15 min

## 2023-12-07 NOTE — CONSULTS
INPATIENT NEPHROLOGY CONSULT   [facility] NEPHROLOGY    Tyra Isaac  12/07/2023    Reason for consultation:    ESRD    Chief Complaint:   Chief Complaint   Patient presents with    Vomiting    Cough    Fatigue     Started with weakness 2 days ago. Dialysis mwf, started vomiting this am.            History of Present Illness:    Per ER  83-year-old female with history of end-stage renal disease with dialysis on Monday Wednesday Friday followed by Dr. Maynard with right upper extremity AV fistula, last dialysis on Friday, atrial fibrillation on Eliquis, hypertension, hyperlipidemia, anxiety, gout, CVA, COPD.  Patient with recent diagnosis of pneumonia 2 weeks ago, completed antibiotics.  She presents emergency department with complaint of persistent cough, shortness of breath, generalized weakness, nausea, vomiting over last 2 days.  Patient states that she has generalized pain all over.  Was to go to dialysis today at 6:00 a.m. however came to the emergency department due to generalized weakness, cough, fatigue.  Her son who is her primary caretaker states that he feels his mother has too many medications to take this is also causing her to feel bad.     12/5  dialysis stopped yesterday due to SVT.  Back in SR.   Mild sob.  Generalized achiness.  No angina.    12/6  underwent dialysis with 1500 cc uf.  No arrhythmias during treatment  12/7 went back into SVT vs AFIB rvr this am.  Cardiology at the bedside.  No cp.  Mild light headedness      Plan of Care:       Assessment:    esrd  --continue dialysis per routine  --fluid restrict  --renal dose medication per routine  --continue outpt medication  --continue binders with meals       Anemia  --erythropoiesis stimulating agent with renal replacement therapy    Hyperphosphatemia  --renal diet  --continue binders    Hypertension  --uf with hd  --fluid restrict  --low salt diet  --continue home medication    Pulmonary edema--better after ultrafiltration  --uf as  tolerated  --fluid restrict    Hypotension  --judicious fluid bolus          Thank you for allowing us to participate in this patient's care. We will continue to follow.    Vital Signs:  Temp Readings from Last 3 Encounters:   12/07/23 97.9 °F (36.6 °C)   11/14/23 98.2 °F (36.8 °C) (Oral)   07/22/23 97.9 °F (36.6 °C) (Oral)       Pulse Readings from Last 3 Encounters:   12/07/23 68   11/29/23 (!) 50   11/14/23 68       BP Readings from Last 3 Encounters:   12/07/23 (!) 129/58   11/29/23 (!) 130/54   11/14/23 121/60       Weight:  Wt Readings from Last 3 Encounters:   12/04/23 50.6 kg (111 lb 8.8 oz)   11/29/23 48.5 kg (107 lb)   11/09/23 52.2 kg (115 lb 1.3 oz)       Past Medical & Surgical History:  Past Medical History:   Diagnosis Date    A-fib     Anxiety     Depression     Disorder of kidney and ureter     Encephalopathy acute 1/1/2018    End stage kidney disease 6/17/2017    Gout     Hyperlipidemia     Hypertension     Moderate episode of recurrent major depressive disorder 1/17/2018    Nephropathy hypertensive, 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    Osteopenia of multiple sites 3/9/2018    Based upon bone density measurements. Patient also has chronic kidney disease.    Stroke 11/2016       Past Surgical History:   Procedure Laterality Date    ANGIOGRAM, CORONARY, WITH LEFT HEART CATHETERIZATION N/A 2/7/2022    Procedure: Angiogram, Coronary, with Left Heart Cath;  Surgeon: Gino Leal MD;  Location: [facility] CATH/EP LAB;  Service: Cardiology;  Laterality: N/A;    CARDIAC SURGERY      stents    EYE SURGERY      WRIST SURGERY         Past Social History:  Social History     Socioeconomic History    Marital status:      Spouse name: Aria Isaac    Number of children: 3   Occupational History    Occupation: Not working   Tobacco Use    Smoking status: Never     Smokeless tobacco: Never   Substance and Sexual Activity    Alcohol use: No    Drug use: No    Sexual activity: Not Currently     Social Determinants of Health     Financial Resource Strain: Medium Risk (6/15/2022)    Overall Financial Resource Strain (CARDIA)     Difficulty of Paying Living Expenses: Somewhat hard   Food Insecurity: No Food Insecurity (6/15/2022)    Hunger Vital Sign     Worried About Running Out of Food in the Last Year: Never true     Ran Out of Food in the Last Year: Never true   Transportation Needs: Unmet Transportation Needs (3/29/2023)    PRAPARE - Transportation     Lack of Transportation (Medical): Yes     Lack of Transportation (Non-Medical): No   Physical Activity: Inactive (6/15/2022)    Exercise Vital Sign     Days of Exercise per Week: 0 days     Minutes of Exercise per Session: 0 min   Stress: Stress Concern Present (6/15/2022)    [employer] [place] [employer] - Occupational Stress Questionnaire     Feeling of Stress : To some extent   Social Connections: Moderately Isolated (6/15/2022)    Social Connection and Isolation Panel [NHANES]     Frequency of Communication with Friends and Family: Three times a week     Frequency of Social Gatherings with Friends and Family: Three times a week     Attends Yazidism Services: 1 to 4 times per year     Active Member of Clubs or Organizations: No     Attends Club or Organization Meetings: Never     Marital Status:    Housing Stability: Low Risk  (6/15/2022)    Housing Stability Vital Sign     Unable to Pay for Housing in the Last Year: No     Number of Places Lived in the Last Year: 1     Unstable Housing in the Last Year: No       Medications:  No current facility-administered medications on file prior to encounter.     Current Outpatient Medications on File Prior to Encounter   Medication Sig Dispense Refill    atorvastatin (LIPITOR) 40 MG tablet Take 40 mg by mouth once daily.      b complex vitamins tablet Take 1 tablet  "by mouth once daily.      calcium acetate,phosphat bind, (PHOSLO) 667 mg tablet Take 667 mg by mouth 2 (two) times daily with meals.      diltiaZEM (CARDIZEM CD) 120 MG Cp24 Take 1 capsule (120 mg total) by mouth 2 (two) times a day.      dorzolamide-timolol 2-0.5% (COSOPT) 22.3-6.8 mg/mL ophthalmic solution Place 1 drop into both eyes 2 (two) times daily.      ELIQUIS 2.5 mg Tab Take 2.5 mg by mouth 2 (two) times daily.      latanoprost 0.005 % ophthalmic solution Place 1 drop into both eyes every evening.      loperamide (IMODIUM A-D) 2 mg Tab Take 2 mg by mouth 4 (four) times daily as needed.      ondansetron (ZOFRAN) 4 MG tablet Take 1 tablet (4 mg total) by mouth every 8 (eight) hours as needed for Nausea. 15 tablet 0    sotaloL (BETAPACE) 80 MG tablet Take 1 tablet (80 mg total) by mouth 2 (two) times daily. 60 tablet 0     Scheduled Meds:  Continuous Infusions:  PRN Meds:.    Allergies:  Cyclobenzaprine, Fish containing products, Peanut, and Tramadol    Past Family History:  Reviewed; refer to Hospitalist Admission Note    Review of Systems:  Review of Systems - All 14 systems reviewed and negative, except as noted in HPI    Physical Exam:    BP (!) 129/58   Pulse 68   Temp 97.9 °F (36.6 °C)   Resp (!) 22   Ht 5' 6" (1.676 m)   Wt 50.6 kg (111 lb 8.8 oz)   SpO2 100%   Breastfeeding No   BMI 18.01 kg/m²     General Appearance:    Ill appearing.     Head:    Normocephalic, without obvious abnormality, atraumatic   Eyes:    PER, conjunctiva/corneas clear, EOM's intact in both eyes        Throat:   Lips, mucosa, and tongue normal; teeth and gums normal   Back:     Symmetric, no curvature, ROM normal, no CVA tenderness   Lungs:     Clear to auscultation bilaterally, respirations unlabored   Chest wall:    No tenderness or deformity   Heart:    Regular rate and rhythm, S1 and S2 normal, no murmur, rub   or gallop   Abdomen:     Soft, non-tender, bowel sounds active all four quadrants,     no masses, no " organomegaly   Extremities:   Diminished breath sounds   Pulses:   2+ and symmetric all extremities   MSK:   No joint or muscle swelling, tenderness or deformity   Skin:   Skin color, texture, turgor normal, no rashes or lesions   Neurologic:   CNII-XII intact, normal strength and sensation       Throughout.  No flap     Results:  Lab Results   Component Value Date     12/07/2023    K 4.6 12/07/2023     12/07/2023    CO2 25 12/07/2023    BUN 32 (H) 12/07/2023    CREATININE 5.8 (H) 12/07/2023    CALCIUM 9.1 12/07/2023    ANIONGAP 11 12/07/2023    ESTGFRAFRICA 4.7 (A) 02/08/2022    EGFRNONAA 4.0 (A) 02/08/2022       Lab Results   Component Value Date    CALCIUM 9.1 12/07/2023    PHOS 2.3 (L) 12/07/2023       Recent Labs   Lab 12/07/23  0442   WBC 5.69   RBC 3.51*   HGB 11.1*   HCT 35.5*      *   MCH 31.6*   MCHC 31.3*            I have personally reviewed pertinent radiological imaging and reports.    Patient care was time spent personally by me on the following activities:   Obtaining a history  Examination of patient.  Providing medical care at the patients bedside.  Developing a treatment plan with patient or surrogate and bedside caregivers  Ordering and reviewing laboratory studies, radiographic studies, pulse oximetry.  Ordering and performing treatments and interventions.  Evaluation of patient's response to treatment.  Discussions with consultants while on the unit and immediately available to the patient.  Re-evaluation of the patient's condition.  Documentation in the medical record.       Jimenez Valero MD  Nephrology  [facility]  [phone]

## 2023-12-08 LAB
ALBUMIN SERPL BCP-MCNC: 2.8 G/DL (ref 3.5–5.2)
ALP SERPL-CCNC: 45 U/L (ref 55–135)
ALT SERPL W/O P-5'-P-CCNC: 15 U/L (ref 10–44)
ANION GAP SERPL CALC-SCNC: 7 MMOL/L (ref 8–16)
AST SERPL-CCNC: 21 U/L (ref 10–40)
BASOPHILS # BLD AUTO: 0.02 K/UL (ref 0–0.2)
BASOPHILS NFR BLD: 0.4 % (ref 0–1.9)
BILIRUB SERPL-MCNC: 0.4 MG/DL (ref 0.1–1)
BUN SERPL-MCNC: 43 MG/DL (ref 8–23)
CALCIUM SERPL-MCNC: 7.9 MG/DL (ref 8.7–10.5)
CHLORIDE SERPL-SCNC: 105 MMOL/L (ref 95–110)
CO2 SERPL-SCNC: 25 MMOL/L (ref 23–29)
CREAT SERPL-MCNC: 7.2 MG/DL (ref 0.5–1.4)
DIFFERENTIAL METHOD BLD: ABNORMAL
EOSINOPHIL # BLD AUTO: 0.5 K/UL (ref 0–0.5)
EOSINOPHIL NFR BLD: 10.2 % (ref 0–8)
ERYTHROCYTE [DISTWIDTH] IN BLOOD BY AUTOMATED COUNT: 15 % (ref 11.5–14.5)
EST. GFR  (NO RACE VARIABLE): 5.2 ML/MIN/1.73 M^2
GLUCOSE SERPL-MCNC: 81 MG/DL (ref 70–110)
HCT VFR BLD AUTO: 31.3 % (ref 37–48.5)
HGB BLD-MCNC: 9.6 G/DL (ref 12–16)
IMM GRANULOCYTES # BLD AUTO: 0.02 K/UL (ref 0–0.04)
IMM GRANULOCYTES NFR BLD AUTO: 0.4 % (ref 0–0.5)
LYMPHOCYTES # BLD AUTO: 1.1 K/UL (ref 1–4.8)
LYMPHOCYTES NFR BLD: 20.6 % (ref 18–48)
MAGNESIUM SERPL-MCNC: 2 MG/DL (ref 1.6–2.6)
MCH RBC QN AUTO: 31 PG (ref 27–31)
MCHC RBC AUTO-ENTMCNC: 30.7 G/DL (ref 32–36)
MCV RBC AUTO: 101 FL (ref 82–98)
MONOCYTES # BLD AUTO: 0.7 K/UL (ref 0.3–1)
MONOCYTES NFR BLD: 13.2 % (ref 4–15)
NEUTROPHILS # BLD AUTO: 2.9 K/UL (ref 1.8–7.7)
NEUTROPHILS NFR BLD: 55.2 % (ref 38–73)
NRBC BLD-RTO: 0 /100 WBC
PHOSPHATE SERPL-MCNC: 2.4 MG/DL (ref 2.7–4.5)
PLATELET # BLD AUTO: 227 K/UL (ref 150–450)
PMV BLD AUTO: 10.4 FL (ref 9.2–12.9)
POTASSIUM SERPL-SCNC: 4.5 MMOL/L (ref 3.5–5.1)
PROT SERPL-MCNC: 6 G/DL (ref 6–8.4)
RBC # BLD AUTO: 3.1 M/UL (ref 4–5.4)
SODIUM SERPL-SCNC: 137 MMOL/L (ref 136–145)
WBC # BLD AUTO: 5.29 K/UL (ref 3.9–12.7)

## 2023-12-08 PROCEDURE — 85025 COMPLETE CBC W/AUTO DIFF WBC: CPT | Performed by: STUDENT IN AN ORGANIZED HEALTH CARE EDUCATION/TRAINING PROGRAM

## 2023-12-08 PROCEDURE — 84100 ASSAY OF PHOSPHORUS: CPT | Performed by: STUDENT IN AN ORGANIZED HEALTH CARE EDUCATION/TRAINING PROGRAM

## 2023-12-08 PROCEDURE — 63600175 PHARM REV CODE 636 W HCPCS: Performed by: STUDENT IN AN ORGANIZED HEALTH CARE EDUCATION/TRAINING PROGRAM

## 2023-12-08 PROCEDURE — 25000003 PHARM REV CODE 250: Performed by: NURSE PRACTITIONER

## 2023-12-08 PROCEDURE — 25000003 PHARM REV CODE 250: Performed by: STUDENT IN AN ORGANIZED HEALTH CARE EDUCATION/TRAINING PROGRAM

## 2023-12-08 PROCEDURE — 83735 ASSAY OF MAGNESIUM: CPT | Performed by: STUDENT IN AN ORGANIZED HEALTH CARE EDUCATION/TRAINING PROGRAM

## 2023-12-08 PROCEDURE — 97166 OT EVAL MOD COMPLEX 45 MIN: CPT

## 2023-12-08 PROCEDURE — 97162 PT EVAL MOD COMPLEX 30 MIN: CPT

## 2023-12-08 PROCEDURE — 36415 COLL VENOUS BLD VENIPUNCTURE: CPT | Performed by: STUDENT IN AN ORGANIZED HEALTH CARE EDUCATION/TRAINING PROGRAM

## 2023-12-08 PROCEDURE — 97530 THERAPEUTIC ACTIVITIES: CPT

## 2023-12-08 PROCEDURE — 21000000 HC CCU ICU ROOM CHARGE

## 2023-12-08 PROCEDURE — 80053 COMPREHEN METABOLIC PANEL: CPT | Performed by: STUDENT IN AN ORGANIZED HEALTH CARE EDUCATION/TRAINING PROGRAM

## 2023-12-08 PROCEDURE — 63600175 PHARM REV CODE 636 W HCPCS: Mod: JZ | Performed by: STUDENT IN AN ORGANIZED HEALTH CARE EDUCATION/TRAINING PROGRAM

## 2023-12-08 PROCEDURE — 90935 HEMODIALYSIS ONE EVALUATION: CPT

## 2023-12-08 PROCEDURE — 99233 SBSQ HOSP IP/OBS HIGH 50: CPT | Mod: ,,, | Performed by: STUDENT IN AN ORGANIZED HEALTH CARE EDUCATION/TRAINING PROGRAM

## 2023-12-08 PROCEDURE — 99900031 HC PATIENT EDUCATION (STAT)

## 2023-12-08 PROCEDURE — 99900035 HC TECH TIME PER 15 MIN (STAT)

## 2023-12-08 PROCEDURE — 94761 N-INVAS EAR/PLS OXIMETRY MLT: CPT

## 2023-12-08 PROCEDURE — 94799 UNLISTED PULMONARY SVC/PX: CPT

## 2023-12-08 RX ORDER — PROCHLORPERAZINE EDISYLATE 5 MG/ML
10 INJECTION INTRAMUSCULAR; INTRAVENOUS EVERY 8 HOURS PRN
Status: DISCONTINUED | OUTPATIENT
Start: 2023-12-08 | End: 2023-12-15 | Stop reason: HOSPADM

## 2023-12-08 RX ORDER — DILTIAZEM HYDROCHLORIDE 30 MG/1
30 TABLET, FILM COATED ORAL 2 TIMES DAILY
Status: DISCONTINUED | OUTPATIENT
Start: 2023-12-08 | End: 2023-12-09

## 2023-12-08 RX ADMIN — DORZOLAMIDE HYDROCHLORIDE AND TIMOLOL MALEATE 1 DROP: 20; 5 SOLUTION/ DROPS OPHTHALMIC at 09:12

## 2023-12-08 RX ADMIN — ONDANSETRON 4 MG: 2 INJECTION INTRAMUSCULAR; INTRAVENOUS at 02:12

## 2023-12-08 RX ADMIN — SENNOSIDES AND DOCUSATE SODIUM 1 TABLET: 8.6; 5 TABLET ORAL at 11:12

## 2023-12-08 RX ADMIN — EPOETIN ALFA-EPBX 2400 UNITS: 10000 INJECTION, SOLUTION INTRAVENOUS; SUBCUTANEOUS at 09:12

## 2023-12-08 RX ADMIN — PROCHLORPERAZINE EDISYLATE 10 MG: 5 INJECTION INTRAMUSCULAR; INTRAVENOUS at 07:12

## 2023-12-08 RX ADMIN — APIXABAN 2.5 MG: 2.5 TABLET, FILM COATED ORAL at 09:12

## 2023-12-08 RX ADMIN — SENNOSIDES AND DOCUSATE SODIUM 1 TABLET: 8.6; 5 TABLET ORAL at 09:12

## 2023-12-08 RX ADMIN — ATORVASTATIN CALCIUM 40 MG: 40 TABLET, FILM COATED ORAL at 11:12

## 2023-12-08 RX ADMIN — MUPIROCIN 1 G: 20 OINTMENT TOPICAL at 11:12

## 2023-12-08 RX ADMIN — MUPIROCIN 1 G: 20 OINTMENT TOPICAL at 09:12

## 2023-12-08 RX ADMIN — LATANOPROST 1 DROP: 50 SOLUTION OPHTHALMIC at 09:12

## 2023-12-08 RX ADMIN — APIXABAN 2.5 MG: 2.5 TABLET, FILM COATED ORAL at 11:12

## 2023-12-08 RX ADMIN — DORZOLAMIDE HYDROCHLORIDE AND TIMOLOL MALEATE 1 DROP: 20; 5 SOLUTION/ DROPS OPHTHALMIC at 11:12

## 2023-12-08 RX ADMIN — CALCIUM ACETATE 667 MG: 667 CAPSULE ORAL at 11:12

## 2023-12-08 RX ADMIN — DILTIAZEM HYDROCHLORIDE 30 MG: 30 TABLET, FILM COATED ORAL at 05:12

## 2023-12-08 RX ADMIN — DILTIAZEM HYDROCHLORIDE 30 MG: 30 TABLET, FILM COATED ORAL at 06:12

## 2023-12-08 RX ADMIN — SOTALOL HYDROCHLORIDE 80 MG: 80 TABLET ORAL at 09:12

## 2023-12-08 NOTE — PT/OT/SLP EVAL
Physical Therapy Evaluation    Patient Name:  Tyra Isaac   MRN:  [id number]    Recommendations:     Discharge Recommendations: Moderate Intensity Therapy   Discharge Equipment Recommendations:  (TBD)   Barriers to discharge: Decreased caregiver support    Assessment:     Tyra Isaac is a 83 y.o. female admitted with a medical diagnosis of Atrial fibrillation.  She presents with the following impairments/functional limitations: weakness, impaired endurance, impaired self care skills, impaired functional mobility, gait instability, impaired balance, pain, impaired cardiopulmonary response to activity.    Pt found sitting up EOB with OT & tech in room and pt agreeable to working with PT. Pt A & O x  3 and has the following co-morbidities: ESRD on HD, HTN, Hx Anemia.  Pt tolerated session poorly once PT entered room and required moderate A for safe mobilization during session today. Pt's blood pressure dropped to 95/53 and further to 70/34 while sitting EOB with increasing dizziness and pt c/o nausea, abdominal cramps and pt then vomited. PT & tech get pt into bed and positioned in R sidelying and notified RN. Pt would benefit from acute PT during hospitalization to increase strength, endurance and safety with mobility. Pt currently demonstrates a need for Moderate Intensit Therapy on a daily basis secondary to a decline in functional status due to Covid-19.       Rehab Prognosis: Fair; patient would benefit from acute skilled PT services to address these deficits and reach maximum level of function.    Recent Surgery: * No surgery found *      Plan:     During this hospitalization, patient to be seen 6 x/week to address the identified rehab impairments via gait training, therapeutic activities, therapeutic exercises and progress toward the following goals:    Plan of Care Expires:  01/08/24    Subjective     Chief Complaint: increasing dizziness, nausea, abdominal cramps and pt vomited.  Patient/Family  Comments/goals: pt wants to be able to attend her friends  tomorrow.  Pain/Comfort:  Pain Rating 1:  (not rated)  Location 1: abdomen  Pain Addressed 1: Reposition, Distraction, Cessation of Activity, Nurse notified  Pain Rating Post-Intervention 1:  (not rated)    Patients cultural, spiritual, Adventism conflicts given the current situation:      Living Environment:  Pt lives alone in a H. She had a friend that assisted with all care, but he passed away just recently.  Prior to admission, patients level of function was Supervision to MI with rollator.  Equipment used at home: rollator.  DME owned (not currently used): none.  Upon discharge, patient will have assistance from her son at times.    Objective:     Communicated with AMY [place] prior to and Sabrina during session.  Patient found sitting edge of bed with blood pressure cuff, peripheral IV, pulse ox (continuous), telemetry  upon PT entry to room.    General Precautions: Standard, airborne, contact, droplet, fall  Orthopedic Precautions:N/A   Braces: N/A  Respiratory Status: Room air    Exams:  Cognitive Exam:  Patient is oriented to Person, Place, and Situation  RLE ROM: WFL  RLE Strength: NT  LLE ROM: WFL  LLE Strength: NT    Functional Mobility:  Bed Mobility:     Sit to Supine: moderate assistance  Rolling Right; moderate assistance to help stabilize pt's BP and prevent aspiration in case of persistent vomiting.  Sitting balance EOB was Supervision > moderate A(vomiting/dizzy)      AM-PAC 6 CLICK MOBILITY  Total Score:11       Treatment & Education:  Pt was educated on the following: call light use, importance of OOB activity and functional mobility to negate the negative effects of prolonged bed rest during this hospitalization, safe transfers/ambulation and discharge planning recommendations/options.          Patient left right sidelying with all lines intact, call button in reach, and AMY Bennett present.    GOALS:   Multidisciplinary Problems        Physical Therapy Goals          Problem: Physical Therapy    Goal Priority Disciplines Outcome Goal Variances Interventions   Physical Therapy Goal     PT, PT/OT      Description: Goals to be met by: 24     Patient will increase functional independence with mobility by performin. Supine to sit with Supervision  2. Sit to stand transfer with Supervision  3. Bed to chair transfer with Supervision using Rolling Walker  4. Gait  x 150 feet with Supervision using Rolling Walker.                              History:     Past Medical History:   Diagnosis Date    A-fib     Anxiety     Depression     Disorder of kidney and ureter     Encephalopathy acute 2018    End stage kidney disease 2017    Gout     Hyperlipidemia     Hypertension     Moderate episode of recurrent major depressive disorder 2018    Nephropathy hypertensive, stage 5 chronic kidney disease or end stage renal disease 2017    Obstructive pattern present on pulmonary function testing 2021    Shows moderate obstruction.    Osteopenia of multiple sites 3/9/2018    Based upon bone density measurements. Patient also has chronic kidney disease.    Stroke 2016       Past Surgical History:   Procedure Laterality Date    ANGIOGRAM, CORONARY, WITH LEFT HEART CATHETERIZATION N/A 2022    Procedure: Angiogram, Coronary, with Left Heart Cath;  Surgeon: Gino Leal MD;  Location: [facility] CATH/EP LAB;  Service: Cardiology;  Laterality: N/A;    CARDIAC SURGERY      stents    EYE SURGERY      WRIST SURGERY         Time Tracking:     PT Received On: 23  PT Start Time: 1353     PT Stop Time: 1409  PT Total Time (min): 16 min     Billable Minutes: Evaluation 8 and Therapeutic Activity 8      2023

## 2023-12-08 NOTE — CONSULTS
INPATIENT NEPHROLOGY CONSULT   [facility] NEPHROLOGY    Tyra Isaac  12/08/2023    Reason for consultation:    ESRD    Chief Complaint:   Chief Complaint   Patient presents with    Vomiting    Cough    Fatigue     Started with weakness 2 days ago. Dialysis mwf, started vomiting this am.            History of Present Illness:    Per ER  83-year-old female with history of end-stage renal disease with dialysis on Monday Wednesday Friday followed by Dr. Maynard with right upper extremity AV fistula, last dialysis on Friday, atrial fibrillation on Eliquis, hypertension, hyperlipidemia, anxiety, gout, CVA, COPD.  Patient with recent diagnosis of pneumonia 2 weeks ago, completed antibiotics.  She presents emergency department with complaint of persistent cough, shortness of breath, generalized weakness, nausea, vomiting over last 2 days.  Patient states that she has generalized pain all over.  Was to go to dialysis today at 6:00 a.m. however came to the emergency department due to generalized weakness, cough, fatigue.  Her son who is her primary caretaker states that he feels his mother has too many medications to take this is also causing her to feel bad.     12/5  dialysis stopped yesterday due to SVT.  Back in SR.   Mild sob.  Generalized achiness.  No angina.    12/6  underwent dialysis with 1500 cc uf.  No arrhythmias during treatment  12/7 went back into SVT vs AFIB rvr this am.  Cardiology at the bedside.  No cp.  Mild light headedness    12/8  Back in sinus rhythm.  AFVSS.  S/p hd with 2 liter ultrafiltration    Plan of Care:       Assessment:    esrd  --continue dialysis per routine  --fluid restrict  --renal dose medication per routine  --continue outpt medication  --continue binders with meals       Anemia  --erythropoiesis stimulating agent with renal replacement therapy    Hyperphosphatemia  --renal diet  --continue binders    Hypertension  --uf with hd  --fluid restrict  --low salt diet  --continue home  medication    Pulmonary edema--better after ultrafiltration  --uf as tolerated  --fluid restrict    Hypotension  -better today  --hold parameters with ultrafiltration          Thank you for allowing us to participate in this patient's care. We will continue to follow.    Vital Signs:  Temp Readings from Last 3 Encounters:   12/08/23 96.6 °F (35.9 °C) (Oral)   11/14/23 98.2 °F (36.8 °C) (Oral)   07/22/23 97.9 °F (36.6 °C) (Oral)       Pulse Readings from Last 3 Encounters:   12/08/23 (!) 56   11/29/23 (!) 50   11/14/23 68       BP Readings from Last 3 Encounters:   12/08/23 (!) 93/52   11/29/23 (!) 130/54   11/14/23 121/60       Weight:  Wt Readings from Last 3 Encounters:   12/04/23 50.6 kg (111 lb 8.8 oz)   11/29/23 48.5 kg (107 lb)   11/09/23 52.2 kg (115 lb 1.3 oz)       Past Medical & Surgical History:  Past Medical History:   Diagnosis Date    A-fib     Anxiety     Depression     Disorder of kidney and ureter     Encephalopathy acute 1/1/2018    End stage kidney disease 6/17/2017    Gout     Hyperlipidemia     Hypertension     Moderate episode of recurrent major depressive disorder 1/17/2018    Nephropathy hypertensive, 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    Osteopenia of multiple sites 3/9/2018    Based upon bone density measurements. Patient also has chronic kidney disease.    Stroke 11/2016       Past Surgical History:   Procedure Laterality Date    ANGIOGRAM, CORONARY, WITH LEFT HEART CATHETERIZATION N/A 2/7/2022    Procedure: Angiogram, Coronary, with Left Heart Cath;  Surgeon: Gino Leal MD;  Location: [facility] CATH[facility]EP LAB;  Service: Cardiology;  Laterality: N/A;    CARDIAC SURGERY      stents    EYE SURGERY      WRIST SURGERY         Past Social History:  Social History     Socioeconomic History    Marital status:      Spouse name: Aria Isaac    Number  of children: 3   Occupational History    Occupation: Not working   Tobacco Use    Smoking status: Never    Smokeless tobacco: Never   Substance and Sexual Activity    Alcohol use: No    Drug use: No    Sexual activity: Not Currently     Social Determinants of Health     Financial Resource Strain: Medium Risk (6/15/2022)    Overall Financial Resource Strain (CARDIA)     Difficulty of Paying Living Expenses: Somewhat hard   Food Insecurity: No Food Insecurity (6/15/2022)    Hunger Vital Sign     Worried About Running Out of Food in the Last Year: Never true     Ran Out of Food in the Last Year: Never true   Transportation Needs: Unmet Transportation Needs (3/29/2023)    PRAPARE - Transportation     Lack of Transportation (Medical): Yes     Lack of Transportation (Non-Medical): No   Physical Activity: Inactive (6/15/2022)    Exercise Vital Sign     Days of Exercise per Week: 0 days     Minutes of Exercise per Session: 0 min   Stress: Stress Concern Present (6/15/2022)    [employer] [place] [employer] - Occupational Stress Questionnaire     Feeling of Stress : To some extent   Social Connections: Moderately Isolated (6/15/2022)    Social Connection and Isolation Panel [NHANES]     Frequency of Communication with Friends and Family: Three times a week     Frequency of Social Gatherings with Friends and Family: Three times a week     Attends Temple Services: 1 to 4 times per year     Active Member of Clubs or Organizations: No     Attends Club or Organization Meetings: Never     Marital Status:    Housing Stability: Low Risk  (6/15/2022)    Housing Stability Vital Sign     Unable to Pay for Housing in the Last Year: No     Number of Places Lived in the Last Year: 1     Unstable Housing in the Last Year: No       Medications:  No current facility-administered medications on file prior to encounter.     Current Outpatient Medications on File Prior to Encounter   Medication Sig Dispense Refill     "atorvastatin (LIPITOR) 40 MG tablet Take 40 mg by mouth once daily.      b complex vitamins tablet Take 1 tablet by mouth once daily.      calcium acetate,phosphat bind, (PHOSLO) 667 mg tablet Take 667 mg by mouth 2 (two) times daily with meals.      diltiaZEM (CARDIZEM CD) 120 MG Cp24 Take 1 capsule (120 mg total) by mouth 2 (two) times a day.      dorzolamide-timolol 2-0.5% (COSOPT) 22.3-6.8 mg/mL ophthalmic solution Place 1 drop into both eyes 2 (two) times daily.      ELIQUIS 2.5 mg Tab Take 2.5 mg by mouth 2 (two) times daily.      latanoprost 0.005 % ophthalmic solution Place 1 drop into both eyes every evening.      loperamide (IMODIUM A-D) 2 mg Tab Take 2 mg by mouth 4 (four) times daily as needed.      ondansetron (ZOFRAN) 4 MG tablet Take 1 tablet (4 mg total) by mouth every 8 (eight) hours as needed for Nausea. 15 tablet 0    sotaloL (BETAPACE) 80 MG tablet Take 1 tablet (80 mg total) by mouth 2 (two) times daily. 60 tablet 0     Scheduled Meds:  Continuous Infusions:  PRN Meds:.    Allergies:  Cyclobenzaprine, Fish containing products, Peanut, and Tramadol    Past Family History:  Reviewed; refer to Hospitalist Admission Note    Review of Systems:  Review of Systems - All 14 systems reviewed and negative, except as noted in HPI    Physical Exam:    BP (!) 93/52   Pulse (!) 56   Temp 96.6 °F (35.9 °C) (Oral)   Resp 19   Ht 5' 6" (1.676 m)   Wt 50.6 kg (111 lb 8.8 oz)   SpO2 100%   Breastfeeding No   BMI 18.01 kg/m²     General Appearance:    Ill appearing.     Head:    Normocephalic, without obvious abnormality, atraumatic   Eyes:    PER, conjunctiva/corneas clear, EOM's intact in both eyes        Throat:   Lips, mucosa, and tongue normal; teeth and gums normal   Back:     Symmetric, no curvature, ROM normal, no CVA tenderness   Lungs:     Clear to auscultation bilaterally, respirations unlabored   Chest wall:    No tenderness or deformity   Heart:    Regular rate and rhythm, S1 and S2 normal, no " murmur, rub   or gallop   Abdomen:     Soft, non-tender, bowel sounds active all four quadrants,     no masses, no organomegaly   Extremities:   Diminished breath sounds   Pulses:   2+ and symmetric all extremities   MSK:   No joint or muscle swelling, tenderness or deformity   Skin:   Skin color, texture, turgor normal, no rashes or lesions   Neurologic:   CNII-XII intact, normal strength and sensation       Throughout.  No flap     Results:  Lab Results   Component Value Date     12/08/2023    K 4.5 12/08/2023     12/08/2023    CO2 25 12/08/2023    BUN 43 (H) 12/08/2023    CREATININE 7.2 (H) 12/08/2023    CALCIUM 7.9 (L) 12/08/2023    ANIONGAP 7 (L) 12/08/2023    ESTGFRAFRICA 4.7 (A) 02/08/2022    EGFRNONAA 4.0 (A) 02/08/2022       Lab Results   Component Value Date    CALCIUM 7.9 (L) 12/08/2023    PHOS 2.4 (L) 12/08/2023       Recent Labs   Lab 12/08/23  0414   WBC 5.29   RBC 3.10*   HGB 9.6*   HCT 31.3*      *   MCH 31.0   MCHC 30.7*            I have personally reviewed pertinent radiological imaging and reports.    Patient care was time spent personally by me on the following activities:   Obtaining a history  Examination of patient.  Providing medical care at the patients bedside.  Developing a treatment plan with patient or surrogate and bedside caregivers  Ordering and reviewing laboratory studies, radiographic studies, pulse oximetry.  Ordering and performing treatments and interventions.  Evaluation of patient's response to treatment.  Discussions with consultants while on the unit and immediately available to the patient.  Re-evaluation of the patient's condition.  Documentation in the medical record.       Jimenez Valero MD  Nephrology  [facility]  [phone]

## 2023-12-08 NOTE — SUBJECTIVE & OBJECTIVE
"Interval History: see "Hospital Course"    Review of Systems   Constitutional:  Positive for activity change and fatigue. Negative for fever.   Respiratory:  Positive for cough. Negative for shortness of breath.    Cardiovascular:  Positive for palpitations. Negative for chest pain.   Musculoskeletal:  Positive for myalgias.   Neurological:  Positive for dizziness and light-headedness.     Objective:     Vital Signs (Most Recent):  Temp: 97.5 °F (36.4 °C) (12/08/23 0730)  Pulse: (!) 44 (12/08/23 0830)  Resp: 20 (12/08/23 0730)  BP: (!) 114/49 (12/08/23 0800)  SpO2: 100 % (12/08/23 0730) Vital Signs (24h Range):  Temp:  [97.1 °F (36.2 °C)-98.7 °F (37.1 °C)] 97.5 °F (36.4 °C)  Pulse:  [] 44  Resp:  [12-34] 20  SpO2:  [95 %-100 %] 100 %  BP: ()/(40-65) 114/49     Weight: 50.6 kg (111 lb 8.8 oz)  Body mass index is 18.01 kg/m².    Intake/Output Summary (Last 24 hours) at 12/8/2023 0831  Last data filed at 12/8/2023 0527  Gross per 24 hour   Intake 1787.85 ml   Output 0 ml   Net 1787.85 ml         Physical Exam  Vitals and nursing note reviewed.   Constitutional:       General: She is not in acute distress.     Appearance: She is ill-appearing.   HENT:      Head: Normocephalic and atraumatic.      Right Ear: External ear normal.      Left Ear: External ear normal.      Nose: Nose normal.      Mouth/Throat:      Mouth: Mucous membranes are moist.      Pharynx: Oropharynx is clear.   Eyes:      Extraocular Movements: Extraocular movements intact.      Conjunctiva/sclera: Conjunctivae normal.   Cardiovascular:      Rate and Rhythm: Bradycardia present. Rhythm irregular.      Pulses: Normal pulses.      Heart sounds: Normal heart sounds.      Comments: RUE AVF with thrill.  Pulmonary:      Effort: Pulmonary effort is normal.      Breath sounds: Normal breath sounds.      Comments: RA.  Abdominal:      General: Bowel sounds are normal. There is no distension.      Palpations: Abdomen is soft.      Tenderness: " There is no abdominal tenderness.   Musculoskeletal:         General: Normal range of motion.      Cervical back: Normal range of motion and neck supple.   Skin:     General: Skin is warm and dry.   Neurological:      Mental Status: She is alert and oriented to person, place, and time.   Psychiatric:         Mood and Affect: Mood normal.         Behavior: Behavior normal.             Significant Labs: All pertinent labs within the past 24 hours have been reviewed.    Significant Imaging: I have reviewed all pertinent imaging results/findings within the past 24 hours.

## 2023-12-08 NOTE — PROGRESS NOTES
"Former[facility]  Department of Cardiology  Progress Note      PATIENT NAME: Tyra Isaac  MRN: [id number]  TODAY'S DATE: 12/08/2023  ADMIT DATE: 12/4/2023                          CONSULT REQUESTED BY: Juanito Ahn MD    SUBJECTIVE     PRINCIPAL PROBLEM: Atrial fibrillation      REASON FOR CONSULT:  SVT    INTERVAL HISTORY:  12/8/23  Feeling better, sitting up in bed drinking juice.  HR 40-50s overnight (likely asleep). Denies lightheadedness  Still has productive cough w/ phlegm     12/7/23:  Pt had episode of RVR 150s and hypotension, SBP 60s. IVFs given. IV digoxin and IV amiodarone 150 mg IV x 10 mins  Pt states she is feeling better now. Appears weak, coughing    12/6/23:  Pt has not any further episodes of RVR. She tolerated HD today w/o abnormal rhythm or tachycardia.     HPI: Pt is 82 yo female admitted for covid 19 as below. Cardiology consulted for SVT vs AF RVR during dialysis yesterday evening around 7 pm.    Pt is resting in bed. Denies chest pain or dyspnea on room air. Reports nausea and vomiting and generalized malaise    Hospitalist HPI: "Tyra Isaac is an 83 year old female with a past medical history of ESRD (MWF), Afib, HTN, anemia, and CAD who presented with worsening fatigue, myalgias, constipation, and cough. She was supposed to go to HD today, but instead she presented to the ED. She was discovered to have COVID-19 on workup with no evidence of pneumonia on CXR. Nephrology was consulted from the ED HD. Hospital Medicine was consulted for admission."     Notes from 12/4/23: 3:58 pm-   Patient was undergoing dialysis at the bedside when her heart rate began to increase on telemetry. EKG showed SVT. She received 6 mg followed by 12 mg of adenosine without improvement in her heart rate. Cardiology was consulted and recommended a diltiazem bolus and infusion. The patient will be transferred to the stepdown unit.     Nurse reported pt converted to sinus zane around 11pm " last night. She has been sinus zane since conversion.    Review of patient's allergies indicates:   Allergen Reactions    Cyclobenzaprine     Fish containing products Hives    Peanut Other (See Comments)    Tramadol Itching       Past Medical History:   Diagnosis Date    A-fib     Anxiety     Depression     Disorder of kidney and ureter     Encephalopathy acute 1/1/2018    End stage kidney disease 6/17/2017    Gout     Hyperlipidemia     Hypertension     Moderate episode of recurrent major depressive disorder 1/17/2018    Nephropathy hypertensive, 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    Osteopenia of multiple sites 3/9/2018    Based upon bone density measurements. Patient also has chronic kidney disease.    Stroke 11/2016     Past Surgical History:   Procedure Laterality Date    ANGIOGRAM, CORONARY, WITH LEFT HEART CATHETERIZATION N/A 2/7/2022    Procedure: Angiogram, Coronary, with Left Heart Cath;  Surgeon: Gino Leal MD;  Location: [facility] CATH/EP LAB;  Service: Cardiology;  Laterality: N/A;    CARDIAC SURGERY      stents    EYE SURGERY      WRIST SURGERY       Social History     Tobacco Use    Smoking status: Never    Smokeless tobacco: Never   Substance Use Topics    Alcohol use: No    Drug use: No        REVIEW OF SYSTEMS  Negative except as mentioned in HPI    OBJECTIVE     VITAL SIGNS (Most Recent)  Temp: 96.6 °F (35.9 °C) (12/08/23 1149)  Pulse: (!) 56 (12/08/23 1149)  Resp: 19 (12/08/23 1149)  BP: (!) 93/52 (12/08/23 1149)  SpO2: 100 % (12/08/23 1149)    VENTILATION STATUS  Resp: 19 (12/08/23 1149)  SpO2: 100 % (12/08/23 1149)           I & O (Last 24H):  Intake/Output Summary (Last 24 hours) at 12/8/2023 1532  Last data filed at 12/8/2023 1157  Gross per 24 hour   Intake 80 ml   Output 2500 ml   Net -2420 ml         WEIGHTS  Wt Readings from Last 3 Encounters:    12/04/23 2220 50.6 kg (111 lb 8.8 oz)   12/04/23 0636 48.1 kg (106 lb)   11/29/23 1040 48.5 kg (107 lb)   11/09/23 2243 52.2 kg (115 lb 1.3 oz)   11/09/23 1651 52.2 kg (115 lb)       PHYSICAL EXAM    GENERAL: elderly female resting in bed in no apparent distress.  HEENT: Normocephalic.    NECK: No JVD.   CARDIAC: Regular bradycardic rate and rhythm. S1 is normal.S2 is normal.No gallops, clicks or murmurs noted at this time.  CHEST ANATOMY: normal.   LUNGS: +wet cough; rhonchi   ABDOMEN: Soft, flat, thin, non tender. Normal bowel sounds.    EXTREMITIES: No edema  CENTRAL NERVOUS SYSTEM: AAO x 3  SKIN: No rash     HOME MEDICATIONS:  No current facility-administered medications on file prior to encounter.     Current Outpatient Medications on File Prior to Encounter   Medication Sig Dispense Refill    atorvastatin (LIPITOR) 40 MG tablet Take 40 mg by mouth once daily.      b complex vitamins tablet Take 1 tablet by mouth once daily.      calcium acetate,phosphat bind, (PHOSLO) 667 mg tablet Take 667 mg by mouth 2 (two) times daily with meals.      diltiaZEM (CARDIZEM CD) 120 MG Cp24 Take 1 capsule (120 mg total) by mouth 2 (two) times a day.      dorzolamide-timolol 2-0.5% (COSOPT) 22.3-6.8 mg/mL ophthalmic solution Place 1 drop into both eyes 2 (two) times daily.      ELIQUIS 2.5 mg Tab Take 2.5 mg by mouth 2 (two) times daily.      latanoprost 0.005 % ophthalmic solution Place 1 drop into both eyes every evening.      loperamide (IMODIUM A-D) 2 mg Tab Take 2 mg by mouth 4 (four) times daily as needed.      ondansetron (ZOFRAN) 4 MG tablet Take 1 tablet (4 mg total) by mouth every 8 (eight) hours as needed for Nausea. 15 tablet 0    sotaloL (BETAPACE) 80 MG tablet Take 1 tablet (80 mg total) by mouth 2 (two) times daily. 60 tablet 0       SCHEDULED MEDS:   apixaban  2.5 mg Oral BID    atorvastatin  40 mg Oral Daily    calcium acetate(phosphat bind)  667 mg Oral Daily    diltiaZEM  30 mg Oral BID     "dorzolamide-timolol 2-0.5%  1 drop Both Eyes BID    epoetin rosy-epbx  50 Units/kg Intravenous Every Mon, Wed, Fri    latanoprost  1 drop Both Eyes QHS    mupirocin   Nasal BID    senna-docusate 8.6-50 mg  1 tablet Oral BID    sotaloL  80 mg Oral BID       CONTINUOUS INFUSIONS:      PRN MEDS:sodium chloride 0.9%, acetaminophen, heparin (porcine), LIDOcaine-prilocaine, melatonin, naloxone, ondansetron, sodium chloride 0.9%, sodium chloride 0.9%    LABS AND DIAGNOSTICS     CBC LAST 3 DAYS  Recent Labs   Lab 12/06/23  0300 12/07/23  0442 12/08/23  0414   WBC 4.64 5.69 5.29   RBC 3.17* 3.51* 3.10*   HGB 10.0* 11.1* 9.6*   HCT 31.5* 35.5* 31.3*   MCV 99* 101* 101*   MCH 31.5* 31.6* 31.0   MCHC 31.7* 31.3* 30.7*   RDW 15.4* 15.5* 15.0*    198 227   MPV 11.1 10.7 10.4   GRAN 70.5  3.3 60.4  3.4 55.2  2.9   LYMPH 14.0*  0.7* 19.3  1.1 20.6  1.1   MONO 10.6  0.5 13.7  0.8 13.2  0.7   BASO 0.01 0.02 0.02   NRBC 0 0 0         COAGULATION LAST 3 DAYS  No results for input(s): "LABPT", "INR", "APTT" in the last 168 hours.    CHEMISTRY LAST 3 DAYS  Recent Labs   Lab 12/06/23  0300 12/07/23  0442 12/08/23  0414    139 137   K 4.0 4.6 4.5    103 105   CO2 24 25 25   ANIONGAP 12 11 7*   BUN 45* 32* 43*   CREATININE 7.5* 5.8* 7.2*    103 81   CALCIUM 7.9* 9.1 7.9*   MG 2.0 2.1 2.0   ALBUMIN 3.1* 3.4* 2.8*   PROT 6.5 7.2 6.0   ALKPHOS 53* 62 45*   ALT 12 16 15   AST 23 25 21   BILITOT 0.7 0.6 0.4         CARDIAC PROFILE LAST 3 DAYS  Recent Labs   Lab 12/04/23  0704 12/05/23  0437   BNP 2,609*  --    LDH  --  197         ENDOCRINE LAST 3 DAYS  No results for input(s): "TSH", "PROCAL" in the last 168 hours.    LAST ARTERIAL BLOOD GAS  ABG  No results for input(s): "PH", "PO2", "PCO2", "HCO3", "BE" in the last 168 hours.    LAST 7 DAYS MICROBIOLOGY   Microbiology Results (last 7 days)       Procedure Component Value Units Date/Time    Blood culture [[id number]] Collected: 12/04/23 0713    Order Status: " Completed Specimen: Blood Updated: 12/08/23 0832     Blood Culture, Routine No Growth to date      No Growth to date      No Growth to date      No Growth to date      No Growth to date    Narrative:      Collection has been rescheduled by [email or web address] at 12/04/2023 07:14 Reason:   Done per phil  Collection has been rescheduled by [email or web address] at 12/04/2023 07:14 Reason:   Done per phil    Blood culture [[id number]] Collected: 12/04/23 0709    Order Status: Completed Specimen: Blood Updated: 12/08/23 0832     Blood Culture, Routine No Growth to date      No Growth to date      No Growth to date      No Growth to date      No Growth to date    Narrative:      Collection has been rescheduled by [email or web address] at 12/04/2023 07:14 Reason:   Done per phil  Collection has been rescheduled by [email or web address] at 12/04/2023 07:14 Reason:   Done per phil    Respiratory Infection Panel (PCR), Nasopharyngeal [[id number]]  (Abnormal) Collected: 12/04/23 0645    Order Status: Completed Updated: 12/04/23 0947     Respiratory Infection Panel Source NP swab     Adenovirus Not Detected     Coronavirus 229E, Common Cold Virus Not Detected     Coronavirus HKU1, Common Cold Virus Not Detected     Coronavirus NL63, Common Cold Virus Not Detected     Coronavirus OC43, Common Cold Virus Not Detected     Comment: Coronavirus strains 229E, HKU1, NL63, and OC43 can cause the common   cold   and are not associated with the respiratory disease outbreak caused   by  the COVID-19 (SARS-CoV-2 novel Coronavirus) strain.           SARS-CoV2 (COVID-19) Qualitative PCR Detected     Human Metapneumovirus Not Detected     Human Rhinovirus/Enterovirus Not Detected     Influenza A (subtypes H1, H1-2009,H3) Not Detected     Influenza B Not Detected     Parainfluenza Virus 1 Not Detected     Parainfluenza Virus 2 Not Detected     Parainfluenza Virus 3 Not Detected     Parainfluenza Virus 4 Not Detected     Respiratory Syncytial Virus Not Detected     Bordetella Parapertussis  (SV2102) Not Detected     Bordetella pertussis (ptxP) Not Detected     Chlamydia pneumoniae Not Detected     Mycoplasma pneumoniae Not Detected     Comment: Respiratory Infection Panel testing performed by Multiplex PCR.       Narrative:      Specimen Source->Nasopharyngeal Swab    Resp Viral Panel PCR, Peds Under 7 Months Nasopharyngeal Swab [[id number]] Collected: 12/04/23 0645    Order Status: Canceled             MOST RECENT IMAGING  X-Ray Chest AP Portable  XR CHEST 1 VIEW    CLINICAL HISTORY:  83 years Female Chest Pain    COMPARISON: November 9, 2023    FINDINGS: Cardiac silhouette size is within normal limits. Atherosclerotic calcification of the aorta. No confluent airspace disease. No pleural effusion or pneumothorax. No acute osseous abnormality.    IMPRESSION:    No acute pulmonary pathology.    Electronically signed by:  Aleksandar Castillo MD  12/04/2023 08:16 AM [facility] Workstation: [id number]      ECHOCARDIOGRAM RESULTS (last 5)  Results for orders placed during the hospital encounter of 03/20/23    Echo    Interpretation Summary  · The left ventricle is normal in size with moderate concentric hypertrophy and normal systolic function.  · The estimated ejection fraction is 57%.  · Normal left ventricular diastolic function.  · Normal right ventricular size with normal right ventricular systolic function.  · Moderate to severe tricuspid regurgitation.  · Mild-to-moderate mitral regurgitation.  · Moderate left atrial enlargement.  · Mild right atrial enlargement.  · There is mild pulmonary hypertension.      Results for orders placed during the hospital encounter of 03/06/23    Echo    Interpretation Summary  · The left ventricle is normal in size with normal systolic function.  · The estimated ejection fraction is 60%.  · Grade II left ventricular diastolic dysfunction.  · Normal right ventricular size with normal right ventricular systolic function.  · Moderate left atrial enlargement.  · Mild-to-moderate  mitral regurgitation.  · Moderate to severe tricuspid regurgitation.  · There is mild pulmonary hypertension.  · There is mild aortic valve stenosis.  · Aortic valve area is 1.78 cm2; peak velocity is 2.01 m/s; mean gradient is 9 mmHg.      Results for orders placed during the hospital encounter of 01/26/22    Echo    Interpretation Summary  · The left ventricle is normal in size with mild concentric hypertrophy and normal systolic function.  · The estimated ejection fraction is 65%.  · Grade II left ventricular diastolic dysfunction.  · Atrial fibrillation not observed.  · Normal right ventricular size with normal right ventricular systolic function.  · Moderate to severe left atrial enlargement.  · Mild right atrial enlargement.  · Mild mitral regurgitation.  · Mild to moderate tricuspid regurgitation.  · Normal central venous pressure (3 mmHg).  · The estimated PA systolic pressure is 36 mmHg.  · Mildly elevated gradient accross mitral valve of around 6 mmHg at HR of 64 bpm. MVA by pressure half time is normal, however this can be inaccurate.      Results for orders placed during the hospital encounter of 03/03/21    Echo Color Flow Doppler? Yes    Interpretation Summary  · The left ventricle is small with concentric remodeling and normal systolic function. The estimated ejection fraction is 63%  · The estimated PA systolic pressure is 38 mmHg.  · Grade II left ventricular diastolic dysfunction.  · Normal right ventricular size with normal right ventricular systolic function.  · Mild-to-moderate mitral regurgitation.  · Mild to moderate tricuspid regurgitation.  · Normal central venous pressure (3 mmHg).  · The patient converted from atrial fibrillation to normal sinus rhythm 03/03/2021      Results for orders placed during the hospital encounter of 12/13/19    Echo Color Flow Doppler? Yes    Interpretation Summary  · Mild concentric left ventricular hypertrophy.  · Normal left ventricular systolic function. The  estimated ejection fraction is 70%  · Grade II (moderate) left ventricular diastolic dysfunction consistent with pseudonormalization.  · The estimated PA systolic pressure is 37 mm Hg  · No wall motion abnormalities.  · Normal right ventricular systolic function.  · Normal central venous pressure (3 mm Hg).  · Mild mitral sclerosis.  · Mild mitral regurgitation.  · Mild to moderate tricuspid regurgitation.      CURRENT/PREVIOUS VISIT EKG  Results for orders placed or performed during the hospital encounter of 12/04/23   EKG 12-lead    Collection Time: 12/07/23 12:57 PM    Narrative    Test Reason : I48.0,    Vent. Rate : 060 BPM     Atrial Rate : 060 BPM     P-R Int : 156 ms          QRS Dur : 070 ms      QT Int : 494 ms       P-R-T Axes : 036 012 002 degrees     QTc Int : 494 ms    Normal sinus rhythm  Nonspecific ST abnormality  Prolonged QT  Abnormal ECG  When compared with ECG of 07-DEC-2023 10:47,  Sinus rhythm has replaced Atrial fibrillation  Vent. rate has decreased BY  83 BPM  Questionable change in QRS duration  Confirmed by Meche SOSA, Phillip SOUZA ([id number]) on 12/7/2023 9:56:21 PM    Referred By: [email or web address]   SELF           Confirmed By:Phillip Mcgregor MD           ASSESSMENT/PLAN:     Active Hospital Problems    Diagnosis    *Atrial fibrillation    COVID-19    CAD s/p PCI    Anemia of chronic disease    Benign hypertension with ESRD (end-stage renal disease)     BP slightly elevated1.21.15 BP elelvated. Took meds in [facility] will increase amlodipine to 10 mg per day. New prescription sent to the pharmacy. She will double the existing prescription. She will continue to monitor her blood pressures.         ASSESSMENT & PLAN:   Afib RVR  CAD  PAF  HTN  ESRD  Anemia  COVID 19      RECOMMENDATIONS:  Pt had mild HS troponin elevation on admit that has trended down, likely demand ischemia from RVR.  Pt denies chest pain or dyspnea.  Stress test in March 2023 that was negative for RI    No recurrence of RVR.   Bradycardic  overnight, likely while asleep  Continue Sotalol 80 mg BID for now  Reduce Cardizem 30 mg to twice daily: 0800, 1800  Continue Eliquis for anticoagulation  Will need outpatient referral to EP    Supportive therapies/treatment for COVID     Cardiology will see PRN over the weekend.     Tsering Bird NP  [facility]  Department of Cardiology  Date of Service: 12/08/2023

## 2023-12-08 NOTE — PLAN OF CARE
Goals to be met by: 24     Patient will increase functional independence with mobility by performin. Supine to sit with Supervision  2. Sit to stand transfer with Supervision  3. Bed to chair transfer with Supervision using Rolling Walker  4. Gait  x 150 feet with Supervision using Rolling Walker.

## 2023-12-08 NOTE — PROGRESS NOTES
[facility]  Adult Nutrition   Progress Note (Follow-Up)    SUMMARY     Recommendations  1) Continue current renal diet as tolerated and encourage intake.  2) Continue Suplena with meals (to provide 450 kcals, 11gm protein per carton).   3) RD to continue to monitor and provide recommendations PRN.    Goals:   1) Pt to consume/tolerate >75% of meals and ONS.   2) Phosphate levels to stabilize and return to standard reference range.   3) Pt to gradually gain +1-2lbs/week to healthy BMI range.    Nutrition Goal Status: new    Dietitian Rounds Brief  F/U Nutrition Note: Pt is an 83 yof admitted with atrial fibrillation and a pmh of ESRD (MWF), Afib, HTN, anemia, and CAD who presented with worsening fatigue, myalgias, constipation, and cough. She is supposed to go to HD today (12/11), which is probably where she is at this time. She does have COVID-19 which was discovered on workup at admission with no evidence of pneumonia on CXR. She is recorded to be eating about 25% of her meals which is not adequate but does seem to be drinking the suplena per nursing. Not able to visit due to Covid 19 restrictions. Will continue to follow prn.    Due to pt being Covid-19 positive could not see her today but nursing reports 0% intake today and 50% yesterday. Pt was constipated but finally had a BM today. Her skin is intact per chart and labs reviewed. See (6 year) weight history below. Will follow prn till discharge.    Wt Readings from Last 60 Encounters:   12/04/23 50.6 kg (111 lb 8.8 oz)   11/29/23 48.5 kg (107 lb)   11/09/23 52.2 kg (115 lb 1.3 oz)   07/22/23 52.2 kg (115 lb)   07/18/23 51.5 kg (113 lb 9.6 oz)   06/28/23 49.9 kg (110 lb)   03/31/23 51.3 kg (113 lb)   03/29/23 53.1 kg (117 lb)   03/21/23 53.1 kg (117 lb)   03/21/23 53.1 kg (117 lb)   03/07/23 52.6 kg (115 lb 14.4 oz)   02/24/23 54.4 kg (119 lb 14.9 oz)   12/10/22 52.7 kg (116 lb 2.9 oz)   12/07/22 53.1 kg (117 lb)   06/15/22 52.6 kg (116 lb)  "  22 54.9 kg (121 lb)   22 54.4 kg (120 lb)   22 56 kg (123 lb 7.3 oz)   22 55.6 kg (122 lb 9.2 oz)   10/11/21 55.8 kg (123 lb)   21 57 kg (125 lb 10.6 oz)   21 57.2 kg (126 lb)   21 58.1 kg (128 lb 1.4 oz)   21 58.1 kg (128 lb 1.4 oz)   21 58.5 kg (129 lb)   20 57.6 kg (127 lb)   20 56.7 kg (125 lb)   20 57.6 kg (126 lb 15.8 oz)   20 57.6 kg (127 lb)   19 59.4 kg (131 lb)   19 59.7 kg (131 lb 9.8 oz)   19 57.2 kg (126 lb)   19 58.1 kg (128 lb)   18 54.9 kg (121 lb)   18 52.6 kg (116 lb)   18 51.7 kg (114 lb)   18 51.7 kg (114 lb)   18 51.7 kg (114 lb)   01/15/18 50.8 kg (112 lb)   17 51.3 kg (113 lb)         Diet order:   Current Diet Order: Renal      Evaluation of Received Nutrient/Fluid Intake  Energy Calories Required: not meeting needs  Protein Required: not meeting needs  Fluid Required: meeting needs  Tolerance: not tolerating     % Intake of Estimated Energy Needs: 50%  % Meal Intake: 25 - 50 %      Intake/Output Summary (Last 24 hours) at 2023 1228  Last data filed at 2023 1157  Gross per 24 hour   Intake 1667.85 ml   Output 2500 ml   Net -832.15 ml        Anthropometrics  Temp: 96.6 °F (35.9 °C)  Height Method: Stated  Height: 5' 6" (167.6 cm)  Height (inches): 66 in  Weight Method: Bed Scale  Weight: 50.6 kg (111 lb 8.8 oz)  Weight (lb): 111.55 lb  Ideal Body Weight (IBW), Female: 130 lb  % Ideal Body Weight, Female (lb): 85.81 %  BMI (Calculated): 18  BMI Grade: 17 - 18.4 protein-energy malnutrition grade I  Usual Body Weight (UBW), k kg (12/3)  % Usual Body Weight: 90.55       Estimated/Assessed Needs  Weight Used For Calorie Calculations: 50.6 kg (111 lb 8.8 oz)  Energy Calorie Requirements (kcal): 0065-7332 kcals/day (30-35 kcals/kg ABW)  Energy Need Method: Kcal/kg (30-35)  Protein Requirements: 71-89 g/day (1.2-1.5 g/kg IBW)  Weight Used For " Protein Calculations: 59 kg (130 lb 1.1 oz)  Fluid Requirements (mL): urine output + 1000mL  Estimated Fluid Requirement Method: RDA Method  RDA Method (mL): 1518       Reason for Assessment  Reason For Assessment: RD follow-up  Diagnosis: cardiac disease  Relevant Medical History: end-stage renal disease with dialysis on Monday Wednesday Friday followed by Dr. Maynard with right upper extremity AV fistula, last dialysis on Friday, atrial fibrillation on Eliquis, hypertension, hyperlipidemia, anxiety, gout, CVA, COPD  General Information Comments: Pt presents emergency department with complaint of persistent cough, shortness of breath, generalized weakness, nausea, vomiting over last 2 days. Noted +COVID. Pt with fair intake of meals per chart review. Pt agreed to Nepro with meals. No GI distress. LBM 12/3. Skin: intact per chart. Wt reviewed. UBW 56kg (2022), CBW 50.6kg reflecting 10% wt loss x 1 year, not significant. NFPE not completed d/t COVID19 to prevent exposure and spread of disease. Pt at risk for malnutrition given varied intakes and possible muscle/fat loss.    Nutrition/Diet History  Food Allergies: fish, peanut  Factors Affecting Nutritional Intake: nausea/vomiting    Nutrition Risk Screen  Nutrition Risk Screen: no indicators present     MST Score: 0  Have you recently lost weight without trying?: No  Weight loss score: 0  Have you been eating poorly because of a decreased appetite?: No  Appetite score: 0       Weight History:  Wt Readings from Last 5 Encounters:   12/04/23 50.6 kg (111 lb 8.8 oz)   11/29/23 48.5 kg (107 lb)   11/09/23 52.2 kg (115 lb 1.3 oz)   07/22/23 52.2 kg (115 lb)   07/18/23 51.5 kg (113 lb 9.6 oz)        Lab/Procedures/Meds: Pertinent Labs/Meds Reviewed    Medications:Pertinent Medications Reviewed  Scheduled Meds:   apixaban  2.5 mg Oral BID    atorvastatin  40 mg Oral Daily    calcium acetate(phosphat bind)  667 mg Oral Daily    diltiaZEM  30 mg Oral BID    dorzolamide-timolol  2-0.5%  1 drop Both Eyes BID    epoetin rosy-epbx  50 Units/kg Intravenous Every Mon, Wed, Fri    latanoprost  1 drop Both Eyes QHS    mupirocin   Nasal BID    senna-docusate 8.6-50 mg  1 tablet Oral BID    sotaloL  80 mg Oral BID     Continuous Infusions:  PRN Meds:.sodium chloride 0.9%, acetaminophen, heparin (porcine), LIDOcaine-prilocaine, melatonin, naloxone, ondansetron, sodium chloride 0.9%, sodium chloride 0.9%    Labs: Pertinent Labs Reviewed  Clinical Chemistry:  Recent Labs   Lab 12/04/23  0704 12/05/23  0437 12/06/23  0300 12/07/23  0442 12/08/23  0414      < > 139 139 137   K 5.0   < > 4.0 4.6 4.5   CL 97   < > 103 103 105   CO2 25   < > 24 25 25   *   < > 105 103 81   BUN 59*   < > 45* 32* 43*   CREATININE 10.6*   < > 7.5* 5.8* 7.2*   CALCIUM 8.0*   < > 7.9* 9.1 7.9*   PROT 7.4   < > 6.5 7.2 6.0   ALBUMIN 3.7   < > 3.1* 3.4* 2.8*   BILITOT 0.8   < > 0.7 0.6 0.4   ALKPHOS 46*   < > 53* 62 45*   AST 27   < > 23 25 21   ALT 9*   < > 12 16 15   ANIONGAP 18*   < > 12 11 7*   MG 2.0   < > 2.0 2.1 2.0   PHOS  --    < > 3.3 2.3* 2.4*   LIPASE 23  --   --   --   --     < > = values in this interval not displayed.     CBC:   Recent Labs   Lab 12/08/23 0414   WBC 5.29   RBC 3.10*   HGB 9.6*   HCT 31.3*      *   MCH 31.0   MCHC 30.7*     Cardiac Profile:  Recent Labs   Lab 12/04/23  0704   BNP 2,609*     Inflammatory Labs:  Recent Labs   Lab 12/05/23 0437   .0*       Monitor and Evaluation  Food and Nutrient Intake: energy intake, food and beverage intake  Food and Nutrient Adminstration: diet order  Knowledge/Beliefs/Attitudes: food and nutrition knowledge/skill  Physical Activity and Function: nutrition-related ADLs and IADLs  Anthropometric Measurements: weight, weight change  Biochemical Data, Medical Tests and Procedures: electrolyte and renal panel, gastrointestinal profile, glucose/endocrine profile, other (specify) (renal profile)  Nutrition-Focused Physical Findings:  overall appearance     Nutrition Risk  Level of Risk/Frequency of Follow-up: moderate - high     Nutrition Follow-Up  RD Follow-up?: Yes      Zenobia Ross RD 12/08/2023 12:28 PM

## 2023-12-08 NOTE — PROGRESS NOTES
"[facility] Medicine  Progress Note    Patient Name: Tyra Isaac  MRN: [id number]  Patient Class: IP- Inpatient   Admission Date: 12/4/2023  Length of Stay: 3 days  Attending Physician: Juanito Ahn MD  Primary Care Provider: Kalpesh Goel MD        Subjective:     Principal Problem:Atrial fibrillation        HPI:  Tyra Isaac is an 83 year old female with a past medical history of ESRD (MWF), Afib, HTN, anemia, and CAD who presented with worsening fatigue, myalgias, constipation, and cough. She was supposed to go to HD today, but instead she presented to the ED. She was discovered to have COVID-19 on workup with no evidence of pneumonia on CXR. Nephrology was consulted from the ED HD. Hospital Medicine was consulted for admission.    Overview/Hospital Course:  Tyra Isaac is an 83 year old female with a past medical history of ESRD (MWF), Afib, HTN, and CAD who presented with acute COVID-19 forcing her to miss HD. She has no evidence of pneumonia and is not in respiratory failure. While undergoing HD 12/4, she developed a narrow QRS complex tachycardia thought to be either SVT of Afib with RVR which resolved after adenosine and administration of a diltiazem infusion. She is being monitored in the stepdown unit as she undergoes HD. Cardiology was consulted and started the patient on PO sotalol as she was able to be weaned from diltiazem. On 12/7, the patient developed Afib with RVR with hypotension which resolved with IV fluids administration, a dose of IV digoxin, and a bolus of IV amiodarone. The converted to NSR and her BP is at the lower limit of normal. Cardiology started PO diltiazem. She will continue to be monitored in the stepdown unit as she is now bradycardic and mildly hypotensive.    Interval History: see "Hospital Course"    Review of Systems   Constitutional:  Positive for activity change and fatigue. Negative for fever.   Respiratory:  Positive for cough. " Negative for shortness of breath.    Cardiovascular:  Positive for palpitations. Negative for chest pain.   Musculoskeletal:  Positive for myalgias.   Neurological:  Positive for dizziness and light-headedness.     Objective:     Vital Signs (Most Recent):  Temp: 97.5 °F (36.4 °C) (12/08/23 0730)  Pulse: (!) 44 (12/08/23 0830)  Resp: 20 (12/08/23 0730)  BP: (!) 114/49 (12/08/23 0800)  SpO2: 100 % (12/08/23 0730) Vital Signs (24h Range):  Temp:  [97.1 °F (36.2 °C)-98.7 °F (37.1 °C)] 97.5 °F (36.4 °C)  Pulse:  [] 44  Resp:  [12-34] 20  SpO2:  [95 %-100 %] 100 %  BP: ()/(40-65) 114/49     Weight: 50.6 kg (111 lb 8.8 oz)  Body mass index is 18.01 kg/m².    Intake/Output Summary (Last 24 hours) at 12/8/2023 0831  Last data filed at 12/8/2023 0527  Gross per 24 hour   Intake 1787.85 ml   Output 0 ml   Net 1787.85 ml         Physical Exam  Vitals and nursing note reviewed.   Constitutional:       General: She is not in acute distress.     Appearance: She is ill-appearing.   HENT:      Head: Normocephalic and atraumatic.      Right Ear: External ear normal.      Left Ear: External ear normal.      Nose: Nose normal.      Mouth/Throat:      Mouth: Mucous membranes are moist.      Pharynx: Oropharynx is clear.   Eyes:      Extraocular Movements: Extraocular movements intact.      Conjunctiva/sclera: Conjunctivae normal.   Cardiovascular:      Rate and Rhythm: Bradycardia present. Rhythm irregular.      Pulses: Normal pulses.      Heart sounds: Normal heart sounds.      Comments: RUE AVF with thrill.  Pulmonary:      Effort: Pulmonary effort is normal.      Breath sounds: Normal breath sounds.      Comments: RA.  Abdominal:      General: Bowel sounds are normal. There is no distension.      Palpations: Abdomen is soft.      Tenderness: There is no abdominal tenderness.   Musculoskeletal:         General: Normal range of motion.      Cervical back: Normal range of motion and neck supple.   Skin:     General: Skin is  warm and dry.   Neurological:      Mental Status: She is alert and oriented to person, place, and time.   Psychiatric:         Mood and Affect: Mood normal.         Behavior: Behavior normal.             Significant Labs: All pertinent labs within the past 24 hours have been reviewed.    Significant Imaging: I have reviewed all pertinent imaging results/findings within the past 24 hours.    Assessment/Plan:      * Atrial fibrillation  Had RVR since resolved.  -Eliquis  -Sotalol  -Diltiazem  -Telemetry  -Cardiology following    COVID-19  No evidence of pneumonia and respiratory failure.  -COVID-19 precautions  -Trend inflammatory markers    Benign hypertension with ESRD (end-stage renal disease)  -Continue to monitor  -HD per Nephrology  -Renally dose medications    CAD s/p PCI  -Continue home statin  -Telemetry    Anemia of chronic disease  Stable.  -Trend Hgb with CBC      VTE Risk Mitigation (From admission, onward)           Ordered     apixaban tablet 2.5 mg  2 times daily         12/04/23 1145     IP VTE HIGH RISK PATIENT  Once         12/04/23 1145     Place sequential compression device  Until discontinued         12/04/23 1145     Reason for No Pharmacological VTE Prophylaxis  Once        Question:  Reasons:  Answer:  Already adequately anticoagulated on oral Anticoagulants    12/04/23 1145     heparin (porcine) injection 5,000 Units  As needed (PRN)         12/04/23 1145                    Discharge Planning   ILAN: 12/9/2023     Code Status: Full Code   Is the patient medically ready for discharge?:     Reason for patient still in hospital (select all that apply): Patient trending condition, Treatment, Consult recommendations, and PT / OT recommendations  Discharge Plan A: Home Health                  Juanito Ahn MD  Department of Hospital Medicine   [facility]

## 2023-12-08 NOTE — PROGRESS NOTES
12/08/23 1054   Vital Signs   Temp 97.6 °F (36.4 °C)   Pulse (!) 49   Resp 20   SpO2 100 %   /60   MAP (mmHg) 86   Post-Hemodialysis Assessment   Rinseback Volume (mL) 250 mL   Blood Volume Processed (Liters) 66.1 L   Dialyzer Clearance Lightly streaked   Duration of Treatment 180 minutes   Total UF (mL) 2500 mL   Net Fluid Removal 2000   Patient Response to Treatment tolerated well   Post-Treatment Weight 47.9 kg (105 lb 9.6 oz)   Treatment Weight Change -2.6   Arterial bleeding stop time (min) 6 min   Venous bleeding stop time (min) 6 min   Post-Hemodialysis Comments no problems

## 2023-12-08 NOTE — PT/OT/SLP EVAL
Occupational Therapy   Evaluation    Name: Tyra Isaac  MRN: [id number]  Admitting Diagnosis: Atrial fibrillation  Recent Surgery: * No surgery found *      Recommendations:     Discharge Recommendations: Moderate Intensity Therapy  Discharge Equipment Recommendations:  bath bench  Barriers to discharge:  Decreased caregiver support    Assessment:     Tyra Isaac is a 83 y.o. female with a medical diagnosis of Atrial fibrillation.   Performance deficits affecting function: weakness, impaired endurance, impaired self care skills, impaired functional mobility, gait instability, impaired balance, decreased safety awareness, impaired cardiopulmonary response to activity.      Pt see in PM after dialysis; she completed bed mobility, lower body dressing, and stood at the edge of the bed with Min-CGA; she then began feeling nauseous; BP was low and continued dropping; pt began vomiting; when pt finished vomiting she was assisted to side lying with nurse present/attending to pt's needs.      Rehab Prognosis: Fair; patient would benefit from acute skilled OT services to address these deficits and reach maximum level of function.       Plan:     Patient to be seen 6 x/week to address the above listed problems via self-care/home management, therapeutic activities, therapeutic exercises  Plan of Care Expires: 01/07/24  Plan of Care Reviewed with: patient    Subjective     Chief Complaint: caregiver recently passed away  Patient/Family Comments/goals: to get her strength back     Occupational Profile:  Living Environment: Lives alone SSH 2STE; tub/shower  Previous level of function: Mod (I) with ADLs/functional mobility using rollator; does not drive; caregiver provided transportation to appointments, the store, etc.  Son also assists at times.    Roles and Routines: dialysis   Equipment Used at Home: rollator  Assistance upon Discharge: son; pt's caregiver passed away recently    Pain/Comfort:  Pain Rating 1:  0/10  Pain Rating Post-Intervention 1: 0/10    Objective:     Communicated with: nurse prior to session.  Patient found supine with telemetry, pulse ox (continuous), peripheral IV, blood pressure cuff upon OT entry to room.    General Precautions: Standard, fall, airborne, droplet, contact  Respiratory Status: Room air    Occupational Performance:    Bed Mobility:    Patient completed Supine to Sit with contact guard assistance  Patient completed Sit to Supine with minimum assistance    Functional Mobility/Transfers:  Patient completed Sit <> Stand Transfer with minimum assistance  with  hand-held assist   Functional Mobility: NT    Activities of Daily Living:  Lower Body Dressing: contact guard assistance seated EOB    Cognitive/Visual Perceptual:  Cognitive/Psychosocial Skills:     -       Follows Commands/attention:Follows multistep  commands    Physical Exam:  Upper Extremity Range of Motion:     -       Right Upper Extremity: WFL  -       Left Upper Extremity: WFL  Upper Extremity Strength:    -       Right Upper Extremity: WFL  -       Left Upper Extremity: WFL   Strength:    -       Right Upper Extremity: WFL  -       Left Upper Extremity: WFL  Fine Motor Coordination:    -       Intact  Gross motor coordination:   WFL    AMPAC 6 Click ADL:  AMPAC Total Score: 21    Treatment & Education:  Pt educated on OT role/POC    Patient left right sidelying with all lines intact, call button in reach, bed alarm on, and nurse present    GOALS:   Multidisciplinary Problems       Occupational Therapy Goals          Problem: Occupational Therapy    Goal Priority Disciplines Outcome Interventions   Occupational Therapy Goal     OT, PT/OT     Description: Goals to be met by: 1/9/24     Patient will increase functional independence with ADLs by performing:    UE Dressing with Supervision.  LE Dressing with Supervision and Assistive Devices as needed.  Grooming while standing at sink with Supervision.  Toileting from  toilet with Supervision for hygiene and clothing management.   Toilet transfer to toilet with Supervision.                         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    Osteopenia of multiple sites 3/9/2018    Based upon bone density measurements. Patient also has chronic kidney disease.    Stroke 11/2016         Past Surgical History:   Procedure Laterality Date    ANGIOGRAM, CORONARY, WITH LEFT HEART CATHETERIZATION N/A 2/7/2022    Procedure: Angiogram, Coronary, with Left Heart Cath;  Surgeon: Gino Leal MD;  Location: [facility] CATH/EP LAB;  Service: Cardiology;  Laterality: N/A;    CARDIAC SURGERY      stents    EYE SURGERY      WRIST SURGERY         Time Tracking:     OT Date of Treatment: 12/08/23  OT Start Time: 1336  OT Stop Time: 1409  OT Total Time (min): 33 min  Partial co-evaluation; PT entered the room just as pt was getting sick/dizzy; pt needed multiple skilled hands for safety/care     Billable Minutes:Evaluation 10  Therapeutic Activity 23    12/8/2023

## 2023-12-09 PROBLEM — R53.81 DEBILITY: Status: ACTIVE | Noted: 2021-06-25

## 2023-12-09 LAB
ALBUMIN SERPL BCP-MCNC: 3.2 G/DL (ref 3.5–5.2)
ALP SERPL-CCNC: 51 U/L (ref 55–135)
ALT SERPL W/O P-5'-P-CCNC: 16 U/L (ref 10–44)
ANION GAP SERPL CALC-SCNC: 8 MMOL/L (ref 8–16)
AST SERPL-CCNC: 24 U/L (ref 10–40)
BACTERIA BLD CULT: NORMAL
BACTERIA BLD CULT: NORMAL
BASOPHILS # BLD AUTO: 0.05 K/UL (ref 0–0.2)
BASOPHILS NFR BLD: 0.8 % (ref 0–1.9)
BILIRUB SERPL-MCNC: 0.5 MG/DL (ref 0.1–1)
BUN SERPL-MCNC: 27 MG/DL (ref 8–23)
CALCIUM SERPL-MCNC: 9 MG/DL (ref 8.7–10.5)
CHLORIDE SERPL-SCNC: 103 MMOL/L (ref 95–110)
CO2 SERPL-SCNC: 27 MMOL/L (ref 23–29)
CREAT SERPL-MCNC: 5.5 MG/DL (ref 0.5–1.4)
DIFFERENTIAL METHOD BLD: ABNORMAL
EOSINOPHIL # BLD AUTO: 0.7 K/UL (ref 0–0.5)
EOSINOPHIL NFR BLD: 10.6 % (ref 0–8)
ERYTHROCYTE [DISTWIDTH] IN BLOOD BY AUTOMATED COUNT: 15 % (ref 11.5–14.5)
EST. GFR  (NO RACE VARIABLE): 7.2 ML/MIN/1.73 M^2
GLUCOSE SERPL-MCNC: 94 MG/DL (ref 70–110)
HCT VFR BLD AUTO: 35.2 % (ref 37–48.5)
HGB BLD-MCNC: 11 G/DL (ref 12–16)
IMM GRANULOCYTES # BLD AUTO: 0.07 K/UL (ref 0–0.04)
IMM GRANULOCYTES NFR BLD AUTO: 1.1 % (ref 0–0.5)
LYMPHOCYTES # BLD AUTO: 1.5 K/UL (ref 1–4.8)
LYMPHOCYTES NFR BLD: 23.9 % (ref 18–48)
MAGNESIUM SERPL-MCNC: 2 MG/DL (ref 1.6–2.6)
MCH RBC QN AUTO: 31.4 PG (ref 27–31)
MCHC RBC AUTO-ENTMCNC: 31.3 G/DL (ref 32–36)
MCV RBC AUTO: 101 FL (ref 82–98)
MONOCYTES # BLD AUTO: 0.8 K/UL (ref 0.3–1)
MONOCYTES NFR BLD: 12.8 % (ref 4–15)
NEUTROPHILS # BLD AUTO: 3.2 K/UL (ref 1.8–7.7)
NEUTROPHILS NFR BLD: 50.8 % (ref 38–73)
NRBC BLD-RTO: 0 /100 WBC
PHOSPHATE SERPL-MCNC: 2.2 MG/DL (ref 2.7–4.5)
PLATELET # BLD AUTO: 294 K/UL (ref 150–450)
PMV BLD AUTO: 10.1 FL (ref 9.2–12.9)
POTASSIUM SERPL-SCNC: 4.6 MMOL/L (ref 3.5–5.1)
PROT SERPL-MCNC: 6.9 G/DL (ref 6–8.4)
RBC # BLD AUTO: 3.5 M/UL (ref 4–5.4)
SODIUM SERPL-SCNC: 138 MMOL/L (ref 136–145)
WBC # BLD AUTO: 6.39 K/UL (ref 3.9–12.7)

## 2023-12-09 PROCEDURE — 85025 COMPLETE CBC W/AUTO DIFF WBC: CPT | Performed by: STUDENT IN AN ORGANIZED HEALTH CARE EDUCATION/TRAINING PROGRAM

## 2023-12-09 PROCEDURE — 94761 N-INVAS EAR/PLS OXIMETRY MLT: CPT

## 2023-12-09 PROCEDURE — 97110 THERAPEUTIC EXERCISES: CPT | Mod: CQ

## 2023-12-09 PROCEDURE — 97530 THERAPEUTIC ACTIVITIES: CPT | Mod: CQ

## 2023-12-09 PROCEDURE — 21000000 HC CCU ICU ROOM CHARGE

## 2023-12-09 PROCEDURE — 63600175 PHARM REV CODE 636 W HCPCS: Performed by: STUDENT IN AN ORGANIZED HEALTH CARE EDUCATION/TRAINING PROGRAM

## 2023-12-09 PROCEDURE — 25000003 PHARM REV CODE 250: Performed by: STUDENT IN AN ORGANIZED HEALTH CARE EDUCATION/TRAINING PROGRAM

## 2023-12-09 PROCEDURE — 97530 THERAPEUTIC ACTIVITIES: CPT

## 2023-12-09 PROCEDURE — 36415 COLL VENOUS BLD VENIPUNCTURE: CPT | Performed by: STUDENT IN AN ORGANIZED HEALTH CARE EDUCATION/TRAINING PROGRAM

## 2023-12-09 PROCEDURE — 97535 SELF CARE MNGMENT TRAINING: CPT

## 2023-12-09 PROCEDURE — 84100 ASSAY OF PHOSPHORUS: CPT | Performed by: STUDENT IN AN ORGANIZED HEALTH CARE EDUCATION/TRAINING PROGRAM

## 2023-12-09 PROCEDURE — 83735 ASSAY OF MAGNESIUM: CPT | Performed by: STUDENT IN AN ORGANIZED HEALTH CARE EDUCATION/TRAINING PROGRAM

## 2023-12-09 PROCEDURE — 80053 COMPREHEN METABOLIC PANEL: CPT | Performed by: STUDENT IN AN ORGANIZED HEALTH CARE EDUCATION/TRAINING PROGRAM

## 2023-12-09 PROCEDURE — 25000003 PHARM REV CODE 250: Performed by: NURSE PRACTITIONER

## 2023-12-09 PROCEDURE — 99900035 HC TECH TIME PER 15 MIN (STAT)

## 2023-12-09 RX ORDER — MIDODRINE HYDROCHLORIDE 2.5 MG/1
5 TABLET ORAL 3 TIMES DAILY
Status: DISCONTINUED | OUTPATIENT
Start: 2023-12-09 | End: 2023-12-15 | Stop reason: HOSPADM

## 2023-12-09 RX ADMIN — MUPIROCIN 1 G: 20 OINTMENT TOPICAL at 09:12

## 2023-12-09 RX ADMIN — LATANOPROST 1 DROP: 50 SOLUTION OPHTHALMIC at 09:12

## 2023-12-09 RX ADMIN — ONDANSETRON 4 MG: 2 INJECTION INTRAMUSCULAR; INTRAVENOUS at 09:12

## 2023-12-09 RX ADMIN — APIXABAN 2.5 MG: 2.5 TABLET, FILM COATED ORAL at 09:12

## 2023-12-09 RX ADMIN — SENNOSIDES AND DOCUSATE SODIUM 1 TABLET: 8.6; 5 TABLET ORAL at 09:12

## 2023-12-09 RX ADMIN — MIDODRINE HYDROCHLORIDE 5 MG: 2.5 TABLET ORAL at 09:12

## 2023-12-09 RX ADMIN — ATORVASTATIN CALCIUM 40 MG: 40 TABLET, FILM COATED ORAL at 09:12

## 2023-12-09 RX ADMIN — CALCIUM ACETATE 667 MG: 667 CAPSULE ORAL at 09:12

## 2023-12-09 RX ADMIN — DORZOLAMIDE HYDROCHLORIDE AND TIMOLOL MALEATE 1 DROP: 20; 5 SOLUTION/ DROPS OPHTHALMIC at 09:12

## 2023-12-09 RX ADMIN — SODIUM CHLORIDE, SODIUM LACTATE, POTASSIUM CHLORIDE, AND CALCIUM CHLORIDE 1000 ML: .6; .31; .03; .02 INJECTION, SOLUTION INTRAVENOUS at 04:12

## 2023-12-09 RX ADMIN — DILTIAZEM HYDROCHLORIDE 30 MG: 30 TABLET, FILM COATED ORAL at 09:12

## 2023-12-09 RX ADMIN — SOTALOL HYDROCHLORIDE 80 MG: 80 TABLET ORAL at 09:12

## 2023-12-09 RX ADMIN — MIDODRINE HYDROCHLORIDE 5 MG: 2.5 TABLET ORAL at 03:12

## 2023-12-09 NOTE — PROGRESS NOTES
INPATIENT NEPHROLOGY Progress  [facility] NEPHROLOGY    Tyra Isaac  12/09/2023    Reason for consultation:    ESRD    Chief Complaint:   Chief Complaint   Patient presents with    Vomiting    Cough    Fatigue     Started with weakness 2 days ago. Dialysis mwf, started vomiting this am.            History of Present Illness:    Per ER  83-year-old female with history of end-stage renal disease with dialysis on Monday Wednesday Friday followed by Dr. Maynard with right upper extremity AV fistula, last dialysis on Friday, atrial fibrillation on Eliquis, hypertension, hyperlipidemia, anxiety, gout, CVA, COPD.  Patient with recent diagnosis of pneumonia 2 weeks ago, completed antibiotics.  She presents emergency department with complaint of persistent cough, shortness of breath, generalized weakness, nausea, vomiting over last 2 days.  Patient states that she has generalized pain all over.  Was to go to dialysis today at 6:00 a.m. however came to the emergency department due to generalized weakness, cough, fatigue.  Her son who is her primary caretaker states that he feels his mother has too many medications to take this is also causing her to feel bad.     12/5  dialysis stopped yesterday due to SVT.  Back in SR.   Mild sob.  Generalized achiness.  No angina.    12/6  underwent dialysis with 1500 cc uf.  No arrhythmias during treatment  12/7 went back into SVT vs AFIB rvr this am.  Cardiology at the bedside.  No cp.  Mild light headedness    12/8  Back in sinus rhythm.  AFVSS.  S/p hd with 2 liter ultrafiltration  12/9  Phos 2.2, discontinued binder for now.  Will trend.  Pt generally feels bad, appetite not very good.  Encouraged PO intake.    Plan of Care:       Assessment:    esrd  --continue dialysis per routine  --fluid restrict  --renal dose medication per routine  --continue outpt medication  --continue binders with meals       Anemia  --erythropoiesis stimulating agent with renal replacement  therapy    Hyperphosphatemia--now hypophosphatemic, likely 2/2 poor PO intake  --renal diet  --hold binders    Hypertension  --uf with hd  --fluid restrict  --low salt diet  --continue home medication    Pulmonary edema--better after ultrafiltration  --uf as tolerated  --fluid restrict    Hypotension  -better today  --hold parameters with ultrafiltration          Thank you for allowing us to participate in this patient's care. We will continue to follow.    Vital Signs:  Temp Readings from Last 3 Encounters:   12/09/23 97.7 °F (36.5 °C) (Oral)   11/14/23 98.2 °F (36.8 °C) (Oral)   07/22/23 97.9 °F (36.6 °C) (Oral)       Pulse Readings from Last 3 Encounters:   12/09/23 (!) 58   11/29/23 (!) 50   11/14/23 68       BP Readings from Last 3 Encounters:   12/09/23 (!) 119/56   11/29/23 (!) 130/54   11/14/23 121/60       Weight:  Wt Readings from Last 3 Encounters:   12/04/23 50.6 kg (111 lb 8.8 oz)   11/29/23 48.5 kg (107 lb)   11/09/23 52.2 kg (115 lb 1.3 oz)       Past Medical & Surgical History:  Past Medical History:   Diagnosis Date    A-fib     Anxiety     Depression     Disorder of kidney and ureter     Encephalopathy acute 1/1/2018    End stage kidney disease 6/17/2017    Gout     Hyperlipidemia     Hypertension     Moderate episode of recurrent major depressive disorder 1/17/2018    Nephropathy hypertensive, 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    Osteopenia of multiple sites 3/9/2018    Based upon bone density measurements. Patient also has chronic kidney disease.    Stroke 11/2016       Past Surgical History:   Procedure Laterality Date    ANGIOGRAM, CORONARY, WITH LEFT HEART CATHETERIZATION N/A 2/7/2022    Procedure: Angiogram, Coronary, with Left Heart Cath;  Surgeon: Gino Leal MD;  Location: [facility] CATH/EP LAB;  Service: Cardiology;  Laterality: N/A;    CARDIAC SURGERY       stents    EYE SURGERY      WRIST SURGERY         Past Social History:  Social History     Socioeconomic History    Marital status:      Spouse name: Aria Isaac    Number of children: 3   Occupational History    Occupation: Not working   Tobacco Use    Smoking status: Never    Smokeless tobacco: Never   Substance and Sexual Activity    Alcohol use: No    Drug use: No    Sexual activity: Not Currently     Social Determinants of Health     Financial Resource Strain: Medium Risk (6/15/2022)    Overall Financial Resource Strain (CARDIA)     Difficulty of Paying Living Expenses: Somewhat hard   Food Insecurity: No Food Insecurity (6/15/2022)    Hunger Vital Sign     Worried About Running Out of Food in the Last Year: Never true     Ran Out of Food in the Last Year: Never true   Transportation Needs: Unmet Transportation Needs (3/29/2023)    PRAPARE - Transportation     Lack of Transportation (Medical): Yes     Lack of Transportation (Non-Medical): No   Physical Activity: Inactive (6/15/2022)    Exercise Vital Sign     Days of Exercise per Week: 0 days     Minutes of Exercise per Session: 0 min   Stress: Stress Concern Present (6/15/2022)    Citizen of the [place] [employer] - Occupational Stress Questionnaire     Feeling of Stress : To some extent   Social Connections: Moderately Isolated (6/15/2022)    Social Connection and Isolation Panel [NHANES]     Frequency of Communication with Friends and Family: Three times a week     Frequency of Social Gatherings with Friends and Family: Three times a week     Attends Congregational Services: 1 to 4 times per year     Active Member of Clubs or Organizations: No     Attends Club or Organization Meetings: Never     Marital Status:    Housing Stability: Low Risk  (6/15/2022)    Housing Stability Vital Sign     Unable to Pay for Housing in the Last Year: No     Number of Places Lived in the Last Year: 1     Unstable Housing in the Last Year: No  "      Medications:  No current facility-administered medications on file prior to encounter.     Current Outpatient Medications on File Prior to Encounter   Medication Sig Dispense Refill    atorvastatin (LIPITOR) 40 MG tablet Take 40 mg by mouth once daily.      b complex vitamins tablet Take 1 tablet by mouth once daily.      calcium acetate,phosphat bind, (PHOSLO) 667 mg tablet Take 667 mg by mouth 2 (two) times daily with meals.      diltiaZEM (CARDIZEM CD) 120 MG Cp24 Take 1 capsule (120 mg total) by mouth 2 (two) times a day.      dorzolamide-timolol 2-0.5% (COSOPT) 22.3-6.8 mg/mL ophthalmic solution Place 1 drop into both eyes 2 (two) times daily.      ELIQUIS 2.5 mg Tab Take 2.5 mg by mouth 2 (two) times daily.      latanoprost 0.005 % ophthalmic solution Place 1 drop into both eyes every evening.      loperamide (IMODIUM A-D) 2 mg Tab Take 2 mg by mouth 4 (four) times daily as needed.      ondansetron (ZOFRAN) 4 MG tablet Take 1 tablet (4 mg total) by mouth every 8 (eight) hours as needed for Nausea. 15 tablet 0    sotaloL (BETAPACE) 80 MG tablet Take 1 tablet (80 mg total) by mouth 2 (two) times daily. 60 tablet 0     Scheduled Meds:  Continuous Infusions:  PRN Meds:.    Allergies:  Cyclobenzaprine, Fish containing products, Peanut, and Tramadol    Past Family History:  Reviewed; refer to Hospitalist Admission Note    Review of Systems:  Review of Systems - All 14 systems reviewed and negative, except as noted in HPI    Physical Exam:    BP (!) 119/56   Pulse (!) 58   Temp 97.7 °F (36.5 °C) (Oral)   Resp (!) 24   Ht 5' 6" (1.676 m)   Wt 50.6 kg (111 lb 8.8 oz)   SpO2 99%   Breastfeeding No   BMI 18.01 kg/m²     General Appearance:    Ill appearing.     Head:    Normocephalic, without obvious abnormality, atraumatic   Eyes:    PER, conjunctiva/corneas clear, EOM's intact in both eyes        Throat:   Lips, mucosa, and tongue normal; teeth and gums normal   Back:     Symmetric, no curvature, ROM " normal, no CVA tenderness   Lungs:     Clear to auscultation bilaterally, respirations unlabored   Chest wall:    No tenderness or deformity   Heart:    Regular rate and rhythm, S1 and S2 normal, no murmur, rub   or gallop   Abdomen:     Soft, non-tender, bowel sounds active all four quadrants,     no masses, no organomegaly   Extremities:   Diminished breath sounds   Pulses:   2+ and symmetric all extremities   MSK:   No joint or muscle swelling, tenderness or deformity   Skin:   Skin color, texture, turgor normal, no rashes or lesions   Neurologic:   CNII-XII intact, normal strength and sensation       Throughout.  No flap     Results:  Lab Results   Component Value Date     12/09/2023    K 4.6 12/09/2023     12/09/2023    CO2 27 12/09/2023    BUN 27 (H) 12/09/2023    CREATININE 5.5 (H) 12/09/2023    CALCIUM 9.0 12/09/2023    ANIONGAP 8 12/09/2023    ESTGFRAFRICA 4.7 (A) 02/08/2022    EGFRNONAA 4.0 (A) 02/08/2022       Lab Results   Component Value Date    CALCIUM 9.0 12/09/2023    PHOS 2.2 (L) 12/09/2023       Recent Labs   Lab 12/09/23  0800   WBC 6.39   RBC 3.50*   HGB 11.0*   HCT 35.2*      *   MCH 31.4*   MCHC 31.3*            I have personally reviewed pertinent radiological imaging and reports.    Patient care was time spent personally by me on the following activities:   Obtaining a history  Examination of patient.  Providing medical care at the patients bedside.  Developing a treatment plan with patient or surrogate and bedside caregivers  Ordering and reviewing laboratory studies, radiographic studies, pulse oximetry.  Ordering and performing treatments and interventions.  Evaluation of patient's response to treatment.  Discussions with consultants while on the unit and immediately available to the patient.  Re-evaluation of the patient's condition.  Documentation in the medical record.       Jimenez Valero MD  Nephrology  [facility]  [phone]

## 2023-12-09 NOTE — CARE UPDATE
12/08/23 1201   Patient Assessment/Suction   Level of Consciousness (AVPU) alert   Respiratory Effort Normal;Unlabored   Expansion/Accessory Muscles/Retractions no use of accessory muscles   All Lung Fields Breath Sounds diminished   PRE-TX-O2   Device (Oxygen Therapy) room air   SpO2 96 %   Pulse Oximetry Type Continuous   $ Pulse Oximetry - Multiple Charge Pulse Oximetry - Multiple   Pulse 75   Resp 20   BP (!) 89/52   Education   $ Education 15 min;DME Oxygen   Respiratory Evaluation   $ Care Plan Tech Time 15 min   $ Eval/Re-eval Charges Re-evaluation   Evaluation For Re-Eval 3 day

## 2023-12-09 NOTE — ASSESSMENT & PLAN NOTE
RVR resolved now sinus zane  -continue eliquis, sotalol, diltiazem  -monitor vitals  -Telemetry  -Cardiology following

## 2023-12-09 NOTE — PT/OT/SLP PROGRESS
Occupational Therapy   Treatment    Name: Tyra Isaac  MRN: [id number]  Admitting Diagnosis:  Atrial fibrillation     The primary encounter diagnosis was End stage renal disease. Diagnoses of Generalized weakness, COVID-19 virus infection, ESRD on HD via are right upper extremity AVF MWF, COVID-19, Chest pain, SVT (supraventricular tachycardia), Sinus bradycardia seen on cardiac monitor, AF (paroxysmal atrial fibrillation), and Atrial fibrillation were also pertinent to this visit.    Recommendations:     Discharge Recommendations: Moderate Intensity Therapy  Discharge Equipment Recommendations:  bath bench  Barriers to discharge:  Decreased caregiver support (hypotension, fall risk)    Assessment:     Tyra Isaac is a 83 y.o. female with a medical diagnosis of Atrial fibrillation.  She presents with Performance deficits affecting function are weakness, impaired endurance, impaired self care skills, impaired functional mobility, gait instability, impaired balance, decreased safety awareness, impaired cardiopulmonary response to activity.    Patient is orthostatic, symptomatic and BP still dropping with OOB activity. Pt had no vomiting this session but endorsed feeling a little nauseous and spat small amount of phlegm once seated in BS chair. She initially denied feeling dizzy but started feeling more dizzy the longer she sat up. BP dropped to 73/33 while sitting ~15 min up in chair. Pt assisted BTB with Min A, returned to supine Min A, bed placed in trendelenburg position for brief period of time, HOB and legs elevated in bed at end of session. Pt. dizziness quickly resolving once lying in bed. Nurse was in room at time giving pt meds at start of OT session and initiated BP reading prior to mobilizing OOB. Nurse was notified at end of session. See vitals below.              Lyin/56               Sitting EOB: 107/67              Sitting after transfer to BS chair: 115/55              Sitting ~15  minutes in chair: 73/33              Bed in trendelenbur/47              Supine with HOB and legs elevated: 115/56.     Rehab Prognosis:  Good and Fair; patient would benefit from acute skilled OT services to address these deficits and reach maximum level of function.       Plan:     Patient to be seen 6 x/week to address the above listed problems via self-care/home management, therapeutic activities, therapeutic exercises  Plan of Care Expires: 24  Plan of Care Reviewed with: patient    Subjective     Chief Complaint: I feel weak.  Patient/Family Comments/goals: I want my hair combed.  Pain/Comfort:  Pain Rating 1: 0/10  Pain Rating Post-Intervention 1: 0/10    Objective:     Communicated with: nurse prior to session.  Patient found HOB elevated with bed alarm, blood pressure cuff, telemetry, pulse ox (continuous), peripheral IV upon OT entry to room.    General Precautions: Standard, fall, droplet, contact, airborne (hypotension)    Orthopedic Precautions:N/A  Braces: N/A  Respiratory Status: Room air     Occupational Performance:     Bed Mobility:    Patient completed Rolling/Turning to Left with  minimum assistance and with side rail  Patient completed Scooting/Bridging with minimum assistance and with side rail  Patient completed Supine to Sit with minimum assistance and with side rail  Patient completed Sit to Supine with minimum assistance and with side rail     Functional Mobility/Transfers:  Patient completed Sit <> Stand Transfer with minimum assistance  with  rolling walker   Patient completed Bed <> Chair Transfer using Step Transfer technique with minimum assistance with hand-held assist and rolling walker  Functional Mobility: Stand step to BS chair min A with RW ~6 steps.    Activities of Daily Living:  Grooming: minimum assistance to comb hair, SBA to brush teeth and wash face seated in BS chair.      Treatment & Education:  Purpose of OT and POC  Pt. seen for self care retraining and  functional mobility retraining with adapted techniques and modifications as stated above.  Pt instructed in fall prevention strategies.  BP assessed throughout 2/2 low BP previous session:   All questions and concerns addressed within scope.  Collaborated with RN       Patient left HOB elevated with all lines intact, call button in reach, bed alarm on, nurse notified, and nurse present    GOALS:   Multidisciplinary Problems       Occupational Therapy Goals          Problem: Occupational Therapy    Goal Priority Disciplines Outcome Interventions   Occupational Therapy Goal     OT, PT/OT     Description: Goals to be met by: 1/9/24     Patient will increase functional independence with ADLs by performing:    UE Dressing with Supervision.  LE Dressing with Supervision and Assistive Devices as needed.  Grooming while standing at sink with Supervision.  Toileting from toilet with Supervision for hygiene and clothing management.   Toilet transfer to toilet with Supervision.                         Time Tracking:     OT Date of Treatment: 12/09/23  OT Start Time: 0918  OT Stop Time: 0949  OT Total Time (min): 31 min    Billable Minutes:Self Care/Home Management 15  Therapeutic Activity 16  Total Time 31    OT/ANSHUL: OT          12/9/2023

## 2023-12-09 NOTE — SUBJECTIVE & OBJECTIVE
Interval History: Patient seen and examined. ALLAN. Still with lower heart rate and blood pressures. She is interested in SNF.      Objective:     Vital Signs (Most Recent):  Temp: 98.2 °F (36.8 °C) (12/09/23 1101)  Pulse: (!) 55 (12/09/23 1301)  Resp: 20 (12/09/23 1301)  BP: (!) 91/56 (12/09/23 1301)  SpO2: 98 % (12/09/23 1301) Vital Signs (24h Range):  Temp:  [97.2 °F (36.2 °C)-98.2 °F (36.8 °C)] 98.2 °F (36.8 °C)  Pulse:  [55-71] 55  Resp:  [16-33] 20  SpO2:  [97 %-100 %] 98 %  BP: ()/(40-56) 91/56     Weight: 50.6 kg (111 lb 8.8 oz)  Body mass index is 18.01 kg/m².    Intake/Output Summary (Last 24 hours) at 12/9/2023 1429  Last data filed at 12/9/2023 1340  Gross per 24 hour   Intake 480 ml   Output 270 ml   Net 210 ml         Physical Exam  Vitals and nursing note reviewed.   Constitutional:       General: She is not in acute distress.  HENT:      Head: Normocephalic and atraumatic.   Cardiovascular:      Rate and Rhythm: Regular rhythm. Bradycardia present.      Pulses: Normal pulses.      Heart sounds: Normal heart sounds.      Comments: RUE AVF with thrill.  Pulmonary:      Effort: Pulmonary effort is normal. No respiratory distress.      Breath sounds: Normal breath sounds.      Comments: RA.  Musculoskeletal:         General: Normal range of motion.      Cervical back: Neck supple.   Skin:     General: Skin is warm and dry.   Neurological:      General: No focal deficit present.      Mental Status: She is alert and oriented to person, place, and time. Mental status is at baseline.   Psychiatric:         Mood and Affect: Mood normal.         Behavior: Behavior normal.             Significant Labs: All pertinent labs within the past 24 hours have been reviewed.    Significant Imaging: I have reviewed all pertinent imaging results/findings within the past 24 hours.

## 2023-12-09 NOTE — CARE UPDATE
12/09/23 0852   Patient Assessment/Suction   Level of Consciousness (AVPU) alert   Respiratory Effort Normal;Unlabored   PRE-TX-O2   Device (Oxygen Therapy) room air   SpO2 97 %   Pulse Oximetry Type Continuous   $ Pulse Oximetry - Multiple Charge Pulse Oximetry - Multiple   Pulse (!) 58   Resp 16   Respiratory Evaluation   $ Care Plan Tech Time 15 min

## 2023-12-09 NOTE — PROGRESS NOTES
[facility] Medicine  Progress Note    Patient Name: Tyra Isaac  MRN: [id number]  Patient Class: IP- Inpatient   Admission Date: 12/4/2023  Length of Stay: 4 days  Attending Physician: Elieser Herrera MD  Primary Care Provider: Kalpesh Goel MD        Subjective:     Principal Problem:Atrial fibrillation        HPI:  Tyra Isaac is an 83 year old female with a past medical history of ESRD (MWF), Afib, HTN, anemia, and CAD who presented with worsening fatigue, myalgias, constipation, and cough. She was supposed to go to HD today, but instead she presented to the ED. She was discovered to have COVID-19 on workup with no evidence of pneumonia on CXR. Nephrology was consulted from the ED HD. Hospital Medicine was consulted for admission.    Overview/Hospital Course:  Tyra Isaac is an 83 year old female with a past medical history of ESRD (MWF), Afib, HTN, and CAD who presented with acute COVID-19 forcing her to miss HD. She has no evidence of pneumonia and is not in respiratory failure. While undergoing HD 12/4, she developed a narrow QRS complex tachycardia thought to be either SVT of Afib with RVR which resolved after adenosine and administration of a diltiazem infusion. She underwent routine HD. Cardiology was consulted and started the patient on PO sotalol as she was able to be weaned from diltiazem infusion. On 12/7, the patient developed Afib with RVR with hypotension which resolved with IV fluids administration, a dose of IV digoxin, and a bolus of IV amiodarone. The converted to NSR and her BP was at the lower limit of normal. Cardiology started PO diltiazem in addition to the PO sotalol and her vitals were monitored. PT/OT recommended moderate intensity therapy so case management consulted to look into SNF.    Interval History: Patient seen and examined. NAEON. Still with lower heart rate and blood pressures. She is interested in SNF.      Objective:     Vital Signs  (Most Recent):  Temp: 98.2 °F (36.8 °C) (12/09/23 1101)  Pulse: (!) 55 (12/09/23 1301)  Resp: 20 (12/09/23 1301)  BP: (!) 91/56 (12/09/23 1301)  SpO2: 98 % (12/09/23 1301) Vital Signs (24h Range):  Temp:  [97.2 °F (36.2 °C)-98.2 °F (36.8 °C)] 98.2 °F (36.8 °C)  Pulse:  [55-71] 55  Resp:  [16-33] 20  SpO2:  [97 %-100 %] 98 %  BP: ()/(40-56) 91/56     Weight: 50.6 kg (111 lb 8.8 oz)  Body mass index is 18.01 kg/m².    Intake/Output Summary (Last 24 hours) at 12/9/2023 1429  Last data filed at 12/9/2023 1340  Gross per 24 hour   Intake 480 ml   Output 270 ml   Net 210 ml         Physical Exam  Vitals and nursing note reviewed.   Constitutional:       General: She is not in acute distress.  HENT:      Head: Normocephalic and atraumatic.   Cardiovascular:      Rate and Rhythm: Regular rhythm. Bradycardia present.      Pulses: Normal pulses.      Heart sounds: Normal heart sounds.      Comments: RUE AVF with thrill.  Pulmonary:      Effort: Pulmonary effort is normal. No respiratory distress.      Breath sounds: Normal breath sounds.      Comments: RA.  Musculoskeletal:         General: Normal range of motion.      Cervical back: Neck supple.   Skin:     General: Skin is warm and dry.   Neurological:      General: No focal deficit present.      Mental Status: She is alert and oriented to person, place, and time. Mental status is at baseline.   Psychiatric:         Mood and Affect: Mood normal.         Behavior: Behavior normal.             Significant Labs: All pertinent labs within the past 24 hours have been reviewed.    Significant Imaging: I have reviewed all pertinent imaging results/findings within the past 24 hours.    Assessment/Plan:      * Atrial fibrillation  RVR resolved now sinus zane  -continue eliquis, sotalol, diltiazem  -monitor vitals  -Telemetry  -Cardiology following    COVID-19  No evidence of pneumonia and respiratory failure.  -COVID-19 precautions    CAD s/p PCI  -Continue home statin,  eliquis, BP control  -Telemetry    Anemia of chronic disease  Stable.  -Trend Hgb with CBC    Debility  - PT/OT; rec moderate intensity therapy  - CM consult for SNF    Benign hypertension with ESRD (end-stage renal disease)  BP on lower side  -Continue to monitor  -HD per Nephrology  -Renally dose medications      VTE Risk Mitigation (From admission, onward)           Ordered     apixaban tablet 2.5 mg  2 times daily         12/04/23 1145     IP VTE HIGH RISK PATIENT  Once         12/04/23 1145     Place sequential compression device  Until discontinued         12/04/23 1145     Reason for No Pharmacological VTE Prophylaxis  Once        Question:  Reasons:  Answer:  Already adequately anticoagulated on oral Anticoagulants    12/04/23 1145     heparin (porcine) injection 5,000 Units  As needed (PRN)         12/04/23 1145                    Discharge Planning   ILAN: 12/10/2023     Code Status: Full Code   Is the patient medically ready for discharge?:     Reason for patient still in hospital (select all that apply): Patient trending condition and Pending disposition  Discharge Plan A: Home Health                  Elieser Herrera MD  Department of Hospital Medicine   [facility]

## 2023-12-09 NOTE — PT/OT/SLP PROGRESS
"Physical Therapy Treatment    Patient Name:  Tyra Isaac   MRN:  [id number]    Recommendations:     Discharge Recommendations: Moderate Intensity Therapy  Discharge Equipment Recommendations:  (TBD)  Barriers to discharge: Decreased caregiver support    Assessment:     Tyra Isaac is a 83 y.o. female admitted with a medical diagnosis of Atrial fibrillation.  She presents with the following impairments/functional limitations: weakness, impaired endurance, impaired self care skills, impaired functional mobility, gait instability, impaired balance, impaired cognition, decreased upper extremity function, decreased lower extremity function, decreased safety awareness, impaired cardiopulmonary response to activity . Pt requires CGA with functional mobility. Movements are slowed and careful, but with extended time the pt can perform t/f's. Pt c/o mod fatigue following t/f to bed side chair, following a short rest break; the pt performed B Le exercises.     Rehab Prognosis: Fair; patient would benefit from acute skilled PT services to address these deficits and reach maximum level of function.    Recent Surgery: * No surgery found *      Plan:     During this hospitalization, patient to be seen 6 x/week to address the identified rehab impairments via gait training, therapeutic activities, therapeutic exercises and progress toward the following goals:    Plan of Care Expires:  24    Subjective     Chief Complaint: Fatigue with mobility  Patient/Family Comments/goals: "I want to make my friends  next Saturday"  Pain/Comfort:  Pain Rating 1: 0/10      Objective:     Communicated with RN prior to session.  Patient found HOB elevated with bed alarm, blood pressure cuff, peripheral IV, pulse ox (continuous), telemetry upon PT entry to room.     General Precautions: Standard, airborne, contact, droplet, fall  Orthopedic Precautions: N/A  Braces: N/A  Respiratory Status: Room air     Functional " Mobility:  Bed Mobility:     Supine to Sit: contact guard assistance  Transfers:     Sit to Stand:  contact guard assistance with rolling walker  Bed to Chair: contact guard assistance with  rolling walker  using  Step Transfer      AM-PAC 6 CLICK MOBILITY  Turning over in bed (including adjusting bedclothes, sheets and blankets)?: 3  Sitting down on and standing up from a chair with arms (e.g., wheelchair, bedside commode, etc.): 3  Moving from lying on back to sitting on the side of the bed?: 3  Moving to and from a bed to a chair (including a wheelchair)?: 3  Need to walk in hospital room?: 2  Climbing 3-5 steps with a railing?: 1  Basic Mobility Total Score: 15       Treatment & Education:  Pt was educated on the following: call light use, importance of OOB activity and functional mobility to negate the negative effects of prolonged bed rest during this hospitalization, safe transfers/ambulation and discharge planning recommendations/options.   Pt performed 3 LE there ex sitting x 10 reps with vc for proper execution and joint protection: ap, laq, marching, hip abd/add, gs/qs.     Patient left up in chair with all lines intact and call button in reach..    GOALS:   Multidisciplinary Problems       Physical Therapy Goals          Problem: Physical Therapy    Goal Priority Disciplines Outcome Goal Variances Interventions   Physical Therapy Goal     PT, PT/OT      Description: Goals to be met by: 24     Patient will increase functional independence with mobility by performin. Supine to sit with Supervision  2. Sit to stand transfer with Supervision  3. Bed to chair transfer with Supervision using Rolling Walker  4. Gait  x 150 feet with Supervision using Rolling Walker.                              Time Tracking:     PT Received On: 23  PT Start Time: 1117     PT Stop Time: 1144  PT Total Time (min): 27 min     Billable Minutes: Therapeutic Activity 17 and Therapeutic Exercise 10    Treatment Type:  Treatment  PT/PTA: PTA     Number of PTA visits since last PT visit: 1     12/09/2023

## 2023-12-10 LAB
ALBUMIN SERPL BCP-MCNC: 2.9 G/DL (ref 3.5–5.2)
ALP SERPL-CCNC: 50 U/L (ref 55–135)
ALT SERPL W/O P-5'-P-CCNC: 18 U/L (ref 10–44)
ANION GAP SERPL CALC-SCNC: 8 MMOL/L (ref 8–16)
AST SERPL-CCNC: 23 U/L (ref 10–40)
BASOPHILS # BLD AUTO: 0.04 K/UL (ref 0–0.2)
BASOPHILS NFR BLD: 0.6 % (ref 0–1.9)
BILIRUB SERPL-MCNC: 0.4 MG/DL (ref 0.1–1)
BUN SERPL-MCNC: 38 MG/DL (ref 8–23)
CALCIUM SERPL-MCNC: 8.3 MG/DL (ref 8.7–10.5)
CHLORIDE SERPL-SCNC: 102 MMOL/L (ref 95–110)
CO2 SERPL-SCNC: 26 MMOL/L (ref 23–29)
CREAT SERPL-MCNC: 7.1 MG/DL (ref 0.5–1.4)
DIFFERENTIAL METHOD BLD: ABNORMAL
EOSINOPHIL # BLD AUTO: 0.8 K/UL (ref 0–0.5)
EOSINOPHIL NFR BLD: 12.1 % (ref 0–8)
ERYTHROCYTE [DISTWIDTH] IN BLOOD BY AUTOMATED COUNT: 14.9 % (ref 11.5–14.5)
EST. GFR  (NO RACE VARIABLE): 5.3 ML/MIN/1.73 M^2
GLUCOSE SERPL-MCNC: 93 MG/DL (ref 70–110)
HCT VFR BLD AUTO: 29.7 % (ref 37–48.5)
HGB BLD-MCNC: 9.1 G/DL (ref 12–16)
IMM GRANULOCYTES # BLD AUTO: 0.06 K/UL (ref 0–0.04)
IMM GRANULOCYTES NFR BLD AUTO: 0.9 % (ref 0–0.5)
LYMPHOCYTES # BLD AUTO: 1.5 K/UL (ref 1–4.8)
LYMPHOCYTES NFR BLD: 22.5 % (ref 18–48)
MAGNESIUM SERPL-MCNC: 2.1 MG/DL (ref 1.6–2.6)
MCH RBC QN AUTO: 31.3 PG (ref 27–31)
MCHC RBC AUTO-ENTMCNC: 30.6 G/DL (ref 32–36)
MCV RBC AUTO: 102 FL (ref 82–98)
MONOCYTES # BLD AUTO: 0.9 K/UL (ref 0.3–1)
MONOCYTES NFR BLD: 12.8 % (ref 4–15)
NEUTROPHILS # BLD AUTO: 3.4 K/UL (ref 1.8–7.7)
NEUTROPHILS NFR BLD: 51.1 % (ref 38–73)
NRBC BLD-RTO: 0 /100 WBC
PHOSPHATE SERPL-MCNC: 1.9 MG/DL (ref 2.7–4.5)
PLATELET # BLD AUTO: 285 K/UL (ref 150–450)
PMV BLD AUTO: 10.2 FL (ref 9.2–12.9)
POTASSIUM SERPL-SCNC: 4.5 MMOL/L (ref 3.5–5.1)
PROT SERPL-MCNC: 6.1 G/DL (ref 6–8.4)
RBC # BLD AUTO: 2.91 M/UL (ref 4–5.4)
SODIUM SERPL-SCNC: 136 MMOL/L (ref 136–145)
WBC # BLD AUTO: 6.71 K/UL (ref 3.9–12.7)

## 2023-12-10 PROCEDURE — 25000003 PHARM REV CODE 250: Performed by: STUDENT IN AN ORGANIZED HEALTH CARE EDUCATION/TRAINING PROGRAM

## 2023-12-10 PROCEDURE — 99900035 HC TECH TIME PER 15 MIN (STAT)

## 2023-12-10 PROCEDURE — 21000000 HC CCU ICU ROOM CHARGE

## 2023-12-10 PROCEDURE — 80053 COMPREHEN METABOLIC PANEL: CPT | Performed by: STUDENT IN AN ORGANIZED HEALTH CARE EDUCATION/TRAINING PROGRAM

## 2023-12-10 PROCEDURE — 94761 N-INVAS EAR/PLS OXIMETRY MLT: CPT

## 2023-12-10 PROCEDURE — 36415 COLL VENOUS BLD VENIPUNCTURE: CPT | Performed by: STUDENT IN AN ORGANIZED HEALTH CARE EDUCATION/TRAINING PROGRAM

## 2023-12-10 PROCEDURE — 83735 ASSAY OF MAGNESIUM: CPT | Performed by: STUDENT IN AN ORGANIZED HEALTH CARE EDUCATION/TRAINING PROGRAM

## 2023-12-10 PROCEDURE — 85025 COMPLETE CBC W/AUTO DIFF WBC: CPT | Performed by: STUDENT IN AN ORGANIZED HEALTH CARE EDUCATION/TRAINING PROGRAM

## 2023-12-10 PROCEDURE — 84100 ASSAY OF PHOSPHORUS: CPT | Performed by: STUDENT IN AN ORGANIZED HEALTH CARE EDUCATION/TRAINING PROGRAM

## 2023-12-10 RX ORDER — SODIUM,POTASSIUM PHOSPHATES 280-250MG
1 POWDER IN PACKET (EA) ORAL ONCE
Status: COMPLETED | OUTPATIENT
Start: 2023-12-10 | End: 2023-12-10

## 2023-12-10 RX ADMIN — MIDODRINE HYDROCHLORIDE 5 MG: 2.5 TABLET ORAL at 08:12

## 2023-12-10 RX ADMIN — POTASSIUM, SODIUM PHOSPHATES 280 MG-160 MG-250 MG ORAL POWDER PACKET 1 PACKET: POWDER IN PACKET at 11:12

## 2023-12-10 RX ADMIN — Medication 6 MG: at 08:12

## 2023-12-10 RX ADMIN — APIXABAN 2.5 MG: 2.5 TABLET, FILM COATED ORAL at 08:12

## 2023-12-10 RX ADMIN — SENNOSIDES AND DOCUSATE SODIUM 1 TABLET: 8.6; 5 TABLET ORAL at 08:12

## 2023-12-10 RX ADMIN — ATORVASTATIN CALCIUM 40 MG: 40 TABLET, FILM COATED ORAL at 08:12

## 2023-12-10 RX ADMIN — LATANOPROST 1 DROP: 50 SOLUTION OPHTHALMIC at 08:12

## 2023-12-10 RX ADMIN — MUPIROCIN 1 G: 20 OINTMENT TOPICAL at 08:12

## 2023-12-10 RX ADMIN — MIDODRINE HYDROCHLORIDE 5 MG: 2.5 TABLET ORAL at 03:12

## 2023-12-10 RX ADMIN — DORZOLAMIDE HYDROCHLORIDE AND TIMOLOL MALEATE 1 DROP: 20; 5 SOLUTION/ DROPS OPHTHALMIC at 08:12

## 2023-12-10 NOTE — PROGRESS NOTES
[facility] Medicine  Progress Note    Patient Name: Tyra Isaac  MRN: [id number]  Patient Class: IP- Inpatient   Admission Date: 12/4/2023  Length of Stay: 5 days  Attending Physician: Elieser Herrera MD  Primary Care Provider: Kalpesh Goel MD        Subjective:     Principal Problem:Atrial fibrillation        HPI:  Tyra Isaac is an 83 year old female with a past medical history of ESRD (MWF), Afib, HTN, anemia, and CAD who presented with worsening fatigue, myalgias, constipation, and cough. She was supposed to go to HD today, but instead she presented to the ED. She was discovered to have COVID-19 on workup with no evidence of pneumonia on CXR. Nephrology was consulted from the ED HD. Hospital Medicine was consulted for admission.    Overview/Hospital Course:  Tyra Isaac is an 83 year old female with a past medical history of ESRD (MWF), Afib, HTN, and CAD who presented with acute COVID-19 forcing her to miss HD. She has no evidence of pneumonia and is not in respiratory failure. While undergoing HD 12/4, she developed a narrow QRS complex tachycardia thought to be either SVT of Afib with RVR which resolved after adenosine and administration of a diltiazem infusion. She underwent routine HD. Cardiology was consulted and started the patient on PO sotalol as she was able to be weaned from diltiazem infusion. On 12/7, the patient developed Afib with RVR with hypotension which resolved with IV fluids administration, a dose of IV digoxin, and a bolus of IV amiodarone. The converted to NSR and her BP was at the lower limit of normal. Cardiology started PO diltiazem in addition to the PO sotalol and her vitals were monitored. She became hypotensive and bradycardic requiring addition of midodrine and IVF bolus with discontinuation of PO diltiazem and sotalol. Cardiology re-consulted. PT/OT recommended moderate intensity therapy so case management consulted to look into  SNF.    Interval History: Patient seen and examined. Had worse hypotension yesterday requiring IVF bolus and midodrine. Her rate and rhythm control meds are discontinued and cardiology has been asked to re-evaluate. She was asymptomatic during her hypotension.      Objective:     Vital Signs (Most Recent):  Temp: 97.2 °F (36.2 °C) (12/10/23 0701)  Pulse: (!) 54 (12/10/23 1207)  Resp: (!) 24 (12/10/23 1207)  BP: (!) 101/47 (12/10/23 1100)  SpO2: 99 % (12/10/23 1207) Vital Signs (24h Range):  Temp:  [97.1 °F (36.2 °C)-97.8 °F (36.6 °C)] 97.2 °F (36.2 °C)  Pulse:  [51-58] 54  Resp:  [20-36] 24  SpO2:  [98 %-100 %] 99 %  BP: ()/(28-60) 101/47     Weight: 50.6 kg (111 lb 8.8 oz)  Body mass index is 18.01 kg/m².    Intake/Output Summary (Last 24 hours) at 12/10/2023 1241  Last data filed at 12/10/2023 0830  Gross per 24 hour   Intake 540 ml   Output --   Net 540 ml         Physical Exam  Vitals and nursing note reviewed.   Constitutional:       General: She is not in acute distress.  HENT:      Head: Normocephalic and atraumatic.   Cardiovascular:      Rate and Rhythm: Regular rhythm. Bradycardia present.      Pulses: Normal pulses.      Heart sounds: Normal heart sounds.      Comments: RUE AVF with thrill.  Pulmonary:      Effort: Pulmonary effort is normal. No respiratory distress.      Breath sounds: Normal breath sounds.      Comments: RA.  Musculoskeletal:         General: Normal range of motion.      Cervical back: Neck supple.   Skin:     General: Skin is warm and dry.   Neurological:      General: No focal deficit present.      Mental Status: She is alert and oriented to person, place, and time. Mental status is at baseline.   Psychiatric:         Mood and Affect: Mood normal.         Behavior: Behavior normal.             Significant Labs: All pertinent labs within the past 24 hours have been reviewed.    Significant Imaging: I have reviewed all pertinent imaging results/findings within the past 24  hours.    Assessment/Plan:      * Atrial fibrillation  RVR resolved now sinus zane  -dc'd sotalol and dilt yesterday due to bradycardia and hypotension  -continue eliquis  -monitor vitals  -Telemetry  -Cardiology re-consulted    COVID-19  No evidence of pneumonia and respiratory failure.  -COVID-19 precautions    CAD s/p PCI  -Continue home statin, eliquis, BP control  -Telemetry    Anemia of chronic disease  Stable.  -Trend Hgb with CBC    Debility  - PT/OT; rec moderate intensity therapy  - CM consult for SNF    Benign hypertension with ESRD (end-stage renal disease)  BP on lower side with episodes of true hypotension  -Continue new midodrine  -Continue to monitor  -HD per Nephrology  -Renally dose medications      VTE Risk Mitigation (From admission, onward)           Ordered     apixaban tablet 2.5 mg  2 times daily         12/04/23 1145     IP VTE HIGH RISK PATIENT  Once         12/04/23 1145     Place sequential compression device  Until discontinued         12/04/23 1145     Reason for No Pharmacological VTE Prophylaxis  Once        Question:  Reasons:  Answer:  Already adequately anticoagulated on oral Anticoagulants    12/04/23 1145     heparin (porcine) injection 5,000 Units  As needed (PRN)         12/04/23 1145                    Discharge Planning   ILAN: 12/10/2023     Code Status: Full Code   Is the patient medically ready for discharge?:     Reason for patient still in hospital (select all that apply): Patient trending condition and Consult recommendations  Discharge Plan A: Home Health                  Elieser Herrera MD  Department of Hospital Medicine   [facility]

## 2023-12-10 NOTE — ASSESSMENT & PLAN NOTE
BP on lower side with episodes of true hypotension  -Continue new midodrine  -Continue to monitor  -HD per Nephrology  -Renally dose medications

## 2023-12-10 NOTE — ASSESSMENT & PLAN NOTE
RVR resolved now sinus zane  -dc'd sotalol and dilt yesterday due to bradycardia and hypotension  -continue eliquis  -monitor vitals  -Telemetry  -Cardiology re-consulted

## 2023-12-10 NOTE — SUBJECTIVE & OBJECTIVE
Interval History: Patient seen and examined. Had worse hypotension yesterday requiring IVF bolus and midodrine. Her rate and rhythm control meds are discontinued and cardiology has been asked to re-evaluate. She was asymptomatic during her hypotension.      Objective:     Vital Signs (Most Recent):  Temp: 97.2 °F (36.2 °C) (12/10/23 0701)  Pulse: (!) 54 (12/10/23 1207)  Resp: (!) 24 (12/10/23 1207)  BP: (!) 101/47 (12/10/23 1100)  SpO2: 99 % (12/10/23 1207) Vital Signs (24h Range):  Temp:  [97.1 °F (36.2 °C)-97.8 °F (36.6 °C)] 97.2 °F (36.2 °C)  Pulse:  [51-58] 54  Resp:  [20-36] 24  SpO2:  [98 %-100 %] 99 %  BP: ()/(28-60) 101/47     Weight: 50.6 kg (111 lb 8.8 oz)  Body mass index is 18.01 kg/m².    Intake/Output Summary (Last 24 hours) at 12/10/2023 1241  Last data filed at 12/10/2023 0830  Gross per 24 hour   Intake 540 ml   Output --   Net 540 ml         Physical Exam  Vitals and nursing note reviewed.   Constitutional:       General: She is not in acute distress.  HENT:      Head: Normocephalic and atraumatic.   Cardiovascular:      Rate and Rhythm: Regular rhythm. Bradycardia present.      Pulses: Normal pulses.      Heart sounds: Normal heart sounds.      Comments: RUE AVF with thrill.  Pulmonary:      Effort: Pulmonary effort is normal. No respiratory distress.      Breath sounds: Normal breath sounds.      Comments: RA.  Musculoskeletal:         General: Normal range of motion.      Cervical back: Neck supple.   Skin:     General: Skin is warm and dry.   Neurological:      General: No focal deficit present.      Mental Status: She is alert and oriented to person, place, and time. Mental status is at baseline.   Psychiatric:         Mood and Affect: Mood normal.         Behavior: Behavior normal.             Significant Labs: All pertinent labs within the past 24 hours have been reviewed.    Significant Imaging: I have reviewed all pertinent imaging results/findings within the past 24 hours.

## 2023-12-10 NOTE — CARE UPDATE
12/10/23 1207   PRE-TX-O2   Device (Oxygen Therapy) room air   SpO2 99 %   Pulse Oximetry Type Continuous   $ Pulse Oximetry - Multiple Charge Pulse Oximetry - Multiple   Pulse (!) 54   Resp (!) 24   Respiratory Evaluation   $ Care Plan Tech Time 15 min

## 2023-12-10 NOTE — PROGRESS NOTES
INPATIENT NEPHROLOGY Progress  [facility] NEPHROLOGY    Tyra Isaac  12/10/2023    Reason for consultation:    ESRD    Chief Complaint:   Chief Complaint   Patient presents with    Vomiting    Cough    Fatigue     Started with weakness 2 days ago. Dialysis mwf, started vomiting this am.            History of Present Illness:    Per ER  83-year-old female with history of end-stage renal disease with dialysis on Monday Wednesday Friday followed by Dr. Maynard with right upper extremity AV fistula, last dialysis on Friday, atrial fibrillation on Eliquis, hypertension, hyperlipidemia, anxiety, gout, CVA, COPD.  Patient with recent diagnosis of pneumonia 2 weeks ago, completed antibiotics.  She presents emergency department with complaint of persistent cough, shortness of breath, generalized weakness, nausea, vomiting over last 2 days.  Patient states that she has generalized pain all over.  Was to go to dialysis today at 6:00 a.m. however came to the emergency department due to generalized weakness, cough, fatigue.  Her son who is her primary caretaker states that he feels his mother has too many medications to take this is also causing her to feel bad.     12/5  dialysis stopped yesterday due to SVT.  Back in SR.   Mild sob.  Generalized achiness.  No angina.    12/6  underwent dialysis with 1500 cc uf.  No arrhythmias during treatment  12/7 went back into SVT vs AFIB rvr this am.  Cardiology at the bedside.  No cp.  Mild light headedness    12/8  Back in sinus rhythm.  AFVSS.  S/p hd with 2 liter ultrafiltration  12/9  Phos 2.2, discontinued binder for now.  Will trend.  Pt generally feels bad, appetite not very good.  Encouraged PO intake.  12/10  pressures low, but stable.  PO4 1.9, got PO repletion this am. Sleeping, NAD noted.     Plan of Care:       Assessment:    esrd  --continue dialysis per routine  --fluid restrict  --renal dose medication per routine  --continue outpt medication  --continue binders  with meals       Anemia  --erythropoiesis stimulating agent with renal replacement therapy    Hyperphosphatemia--now hypophosphatemic, likely 2/2 poor PO intake.  Got repletion 12/10  --renal diet  --hold binders    Hypertension  --uf with hd  --fluid restrict  --low salt diet  --continue home medication    Pulmonary edema--better after ultrafiltration  --uf as tolerated  --fluid restrict    Hypotension  -better today  --hold parameters with ultrafiltration          Thank you for allowing us to participate in this patient's care. We will continue to follow.    Vital Signs:  Temp Readings from Last 3 Encounters:   12/10/23 97.2 °F (36.2 °C) (Oral)   11/14/23 98.2 °F (36.8 °C) (Oral)   07/22/23 97.9 °F (36.6 °C) (Oral)       Pulse Readings from Last 3 Encounters:   12/10/23 (!) 54   11/29/23 (!) 50   11/14/23 68       BP Readings from Last 3 Encounters:   12/10/23 (!) 106/49   11/29/23 (!) 130/54   11/14/23 121/60       Weight:  Wt Readings from Last 3 Encounters:   12/04/23 50.6 kg (111 lb 8.8 oz)   11/29/23 48.5 kg (107 lb)   11/09/23 52.2 kg (115 lb 1.3 oz)       Past Medical & Surgical History:  Past Medical History:   Diagnosis Date    A-fib     Anxiety     Depression     Disorder of kidney and ureter     Encephalopathy acute 1/1/2018    End stage kidney disease 6/17/2017    Gout     Hyperlipidemia     Hypertension     Moderate episode of recurrent major depressive disorder 1/17/2018    Nephropathy hypertensive, 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    Osteopenia of multiple sites 3/9/2018    Based upon bone density measurements. Patient also has chronic kidney disease.    Stroke 11/2016       Past Surgical History:   Procedure Laterality Date    ANGIOGRAM, CORONARY, WITH LEFT HEART CATHETERIZATION N/A 2/7/2022    Procedure: Angiogram, Coronary, with Left Heart Cath;  Surgeon: Gino  Tray Leal MD;  Location: [facility] CATH/EP LAB;  Service: Cardiology;  Laterality: N/A;    CARDIAC SURGERY      stents    EYE SURGERY      WRIST SURGERY         Past Social History:  Social History     Socioeconomic History    Marital status:      Spouse name: Aria Isaac    Number of children: 3   Occupational History    Occupation: Not working   Tobacco Use    Smoking status: Never    Smokeless tobacco: Never   Substance and Sexual Activity    Alcohol use: No    Drug use: No    Sexual activity: Not Currently     Social Determinants of Health     Financial Resource Strain: Medium Risk (6/15/2022)    Overall Financial Resource Strain (CARDIA)     Difficulty of Paying Living Expenses: Somewhat hard   Food Insecurity: No Food Insecurity (6/15/2022)    Hunger Vital Sign     Worried About Running Out of Food in the Last Year: Never true     Ran Out of Food in the Last Year: Never true   Transportation Needs: Unmet Transportation Needs (3/29/2023)    PRAPARE - Transportation     Lack of Transportation (Medical): Yes     Lack of Transportation (Non-Medical): No   Physical Activity: Inactive (6/15/2022)    Exercise Vital Sign     Days of Exercise per Week: 0 days     Minutes of Exercise per Session: 0 min   Stress: Stress Concern Present (6/15/2022)    [employer] - Occupational Stress Questionnaire     Feeling of Stress : To some extent   Social Connections: Moderately Isolated (6/15/2022)    Social Connection and Isolation Panel [NHANES]     Frequency of Communication with Friends and Family: Three times a week     Frequency of Social Gatherings with Friends and Family: Three times a week     Attends Je[employer] witness Services: 1 to 4 times per year     Active Member of Clubs or Organizations: No     Attends Club or Organization Meetings: Never     Marital Status:    Housing Stability: Low Risk  (6/15/2022)    Housing Stability Vital Sign     Unable to Pay for Housing in the Last  "Year: No     Number of Places Lived in the Last Year: 1     Unstable Housing in the Last Year: No       Medications:  No current facility-administered medications on file prior to encounter.     Current Outpatient Medications on File Prior to Encounter   Medication Sig Dispense Refill    atorvastatin (LIPITOR) 40 MG tablet Take 40 mg by mouth once daily.      b complex vitamins tablet Take 1 tablet by mouth once daily.      calcium acetate,phosphat bind, (PHOSLO) 667 mg tablet Take 667 mg by mouth 2 (two) times daily with meals.      diltiaZEM (CARDIZEM CD) 120 MG Cp24 Take 1 capsule (120 mg total) by mouth 2 (two) times a day.      dorzolamide-timolol 2-0.5% (COSOPT) 22.3-6.8 mg/mL ophthalmic solution Place 1 drop into both eyes 2 (two) times daily.      ELIQUIS 2.5 mg Tab Take 2.5 mg by mouth 2 (two) times daily.      latanoprost 0.005 % ophthalmic solution Place 1 drop into both eyes every evening.      loperamide (IMODIUM A-D) 2 mg Tab Take 2 mg by mouth 4 (four) times daily as needed.      ondansetron (ZOFRAN) 4 MG tablet Take 1 tablet (4 mg total) by mouth every 8 (eight) hours as needed for Nausea. 15 tablet 0    sotaloL (BETAPACE) 80 MG tablet Take 1 tablet (80 mg total) by mouth 2 (two) times daily. 60 tablet 0     Scheduled Meds:  Continuous Infusions:  PRN Meds:.    Allergies:  Cyclobenzaprine, Fish containing products, Peanut, and Tramadol    Past Family History:  Reviewed; refer to Hospitalist Admission Note    Review of Systems:  Review of Systems - All 14 systems reviewed and negative, except as noted in HPI    Physical Exam:    BP (!) 106/49   Pulse (!) 54   Temp 97.2 °F (36.2 °C) (Oral)   Resp 20   Ht 5' 6" (1.676 m)   Wt 50.6 kg (111 lb 8.8 oz)   SpO2 99%   Breastfeeding No   BMI 18.01 kg/m²     General Appearance:    Ill appearing.     Head:    Normocephalic, without obvious abnormality, atraumatic   Eyes:    PER, conjunctiva/corneas clear, EOM's intact in both eyes        Throat:   Lips, " mucosa, and tongue normal; teeth and gums normal   Back:     Symmetric, no curvature, ROM normal, no CVA tenderness   Lungs:     Clear to auscultation bilaterally, respirations unlabored   Chest wall:    No tenderness or deformity   Heart:    Regular rate and rhythm, S1 and S2 normal, no murmur, rub   or gallop   Abdomen:     Soft, non-tender, bowel sounds active all four quadrants,     no masses, no organomegaly   Extremities:   Diminished breath sounds   Pulses:   2+ and symmetric all extremities   MSK:   No joint or muscle swelling, tenderness or deformity   Skin:   Skin color, texture, turgor normal, no rashes or lesions   Neurologic:   CNII-XII intact, normal strength and sensation       Throughout.  No flap     Results:  Lab Results   Component Value Date     12/10/2023    K 4.5 12/10/2023     12/10/2023    CO2 26 12/10/2023    BUN 38 (H) 12/10/2023    CREATININE 7.1 (H) 12/10/2023    CALCIUM 8.3 (L) 12/10/2023    ANIONGAP 8 12/10/2023    ESTGFRAFRICA 4.7 (A) 02/08/2022    EGFRNONAA 4.0 (A) 02/08/2022       Lab Results   Component Value Date    CALCIUM 8.3 (L) 12/10/2023    PHOS 1.9 (L) 12/10/2023       Recent Labs   Lab 12/10/23  0350   WBC 6.71   RBC 2.91*   HGB 9.1*   HCT 29.7*      *   MCH 31.3*   MCHC 30.6*            I have personally reviewed pertinent radiological imaging and reports.    Patient care was time spent personally by me on the following activities:   Obtaining a history  Examination of patient.  Providing medical care at the patients bedside.  Developing a treatment plan with patient or surrogate and bedside caregivers  Ordering and reviewing laboratory studies, radiographic studies, pulse oximetry.  Ordering and performing treatments and interventions.  Evaluation of patient's response to treatment.  Discussions with consultants while on the unit and immediately available to the patient.  Re-evaluation of the patient's condition.  Documentation in the medical  record.       Jimenez Valero MD  Nephrology  [facility] [place]  [phone]

## 2023-12-11 LAB
ALBUMIN SERPL BCP-MCNC: 2.9 G/DL (ref 3.5–5.2)
ALP SERPL-CCNC: 51 U/L (ref 55–135)
ALT SERPL W/O P-5'-P-CCNC: 23 U/L (ref 10–44)
ANION GAP SERPL CALC-SCNC: 6 MMOL/L (ref 8–16)
AST SERPL-CCNC: 28 U/L (ref 10–40)
BASOPHILS # BLD AUTO: 0.04 K/UL (ref 0–0.2)
BASOPHILS NFR BLD: 0.5 % (ref 0–1.9)
BILIRUB SERPL-MCNC: 0.3 MG/DL (ref 0.1–1)
BUN SERPL-MCNC: 44 MG/DL (ref 8–23)
CALCIUM SERPL-MCNC: 8.2 MG/DL (ref 8.7–10.5)
CHLORIDE SERPL-SCNC: 100 MMOL/L (ref 95–110)
CO2 SERPL-SCNC: 28 MMOL/L (ref 23–29)
CREAT SERPL-MCNC: 8.4 MG/DL (ref 0.5–1.4)
DIFFERENTIAL METHOD BLD: ABNORMAL
EOSINOPHIL # BLD AUTO: 0.8 K/UL (ref 0–0.5)
EOSINOPHIL NFR BLD: 9.6 % (ref 0–8)
ERYTHROCYTE [DISTWIDTH] IN BLOOD BY AUTOMATED COUNT: 14.8 % (ref 11.5–14.5)
EST. GFR  (NO RACE VARIABLE): 4.3 ML/MIN/1.73 M^2
GLUCOSE SERPL-MCNC: 94 MG/DL (ref 70–110)
HCT VFR BLD AUTO: 28.1 % (ref 37–48.5)
HGB BLD-MCNC: 8.9 G/DL (ref 12–16)
IMM GRANULOCYTES # BLD AUTO: 0.07 K/UL (ref 0–0.04)
IMM GRANULOCYTES NFR BLD AUTO: 0.8 % (ref 0–0.5)
LYMPHOCYTES # BLD AUTO: 1.4 K/UL (ref 1–4.8)
LYMPHOCYTES NFR BLD: 17 % (ref 18–48)
MAGNESIUM SERPL-MCNC: 2.1 MG/DL (ref 1.6–2.6)
MCH RBC QN AUTO: 31.9 PG (ref 27–31)
MCHC RBC AUTO-ENTMCNC: 31.7 G/DL (ref 32–36)
MCV RBC AUTO: 101 FL (ref 82–98)
MONOCYTES # BLD AUTO: 1 K/UL (ref 0.3–1)
MONOCYTES NFR BLD: 11.9 % (ref 4–15)
NEUTROPHILS # BLD AUTO: 5.1 K/UL (ref 1.8–7.7)
NEUTROPHILS NFR BLD: 60.2 % (ref 38–73)
NRBC BLD-RTO: 0 /100 WBC
PHOSPHATE SERPL-MCNC: 2.4 MG/DL (ref 2.7–4.5)
PLATELET # BLD AUTO: 296 K/UL (ref 150–450)
PMV BLD AUTO: 10 FL (ref 9.2–12.9)
POTASSIUM SERPL-SCNC: 4.7 MMOL/L (ref 3.5–5.1)
PROT SERPL-MCNC: 6 G/DL (ref 6–8.4)
RBC # BLD AUTO: 2.79 M/UL (ref 4–5.4)
SODIUM SERPL-SCNC: 134 MMOL/L (ref 136–145)
WBC # BLD AUTO: 8.48 K/UL (ref 3.9–12.7)

## 2023-12-11 PROCEDURE — 90935 HEMODIALYSIS ONE EVALUATION: CPT

## 2023-12-11 PROCEDURE — 80053 COMPREHEN METABOLIC PANEL: CPT | Performed by: STUDENT IN AN ORGANIZED HEALTH CARE EDUCATION/TRAINING PROGRAM

## 2023-12-11 PROCEDURE — 63600175 PHARM REV CODE 636 W HCPCS: Mod: JZ | Performed by: STUDENT IN AN ORGANIZED HEALTH CARE EDUCATION/TRAINING PROGRAM

## 2023-12-11 PROCEDURE — 99900031 HC PATIENT EDUCATION (STAT)

## 2023-12-11 PROCEDURE — 94761 N-INVAS EAR/PLS OXIMETRY MLT: CPT

## 2023-12-11 PROCEDURE — 99900035 HC TECH TIME PER 15 MIN (STAT)

## 2023-12-11 PROCEDURE — 25000003 PHARM REV CODE 250: Performed by: STUDENT IN AN ORGANIZED HEALTH CARE EDUCATION/TRAINING PROGRAM

## 2023-12-11 PROCEDURE — 99233 SBSQ HOSP IP/OBS HIGH 50: CPT | Mod: ,,, | Performed by: INTERNAL MEDICINE

## 2023-12-11 PROCEDURE — 36415 COLL VENOUS BLD VENIPUNCTURE: CPT | Performed by: STUDENT IN AN ORGANIZED HEALTH CARE EDUCATION/TRAINING PROGRAM

## 2023-12-11 PROCEDURE — 84100 ASSAY OF PHOSPHORUS: CPT | Performed by: STUDENT IN AN ORGANIZED HEALTH CARE EDUCATION/TRAINING PROGRAM

## 2023-12-11 PROCEDURE — 85025 COMPLETE CBC W/AUTO DIFF WBC: CPT | Performed by: STUDENT IN AN ORGANIZED HEALTH CARE EDUCATION/TRAINING PROGRAM

## 2023-12-11 PROCEDURE — 21000000 HC CCU ICU ROOM CHARGE

## 2023-12-11 PROCEDURE — 83735 ASSAY OF MAGNESIUM: CPT | Performed by: STUDENT IN AN ORGANIZED HEALTH CARE EDUCATION/TRAINING PROGRAM

## 2023-12-11 RX ADMIN — DORZOLAMIDE HYDROCHLORIDE AND TIMOLOL MALEATE 1 DROP: 20; 5 SOLUTION/ DROPS OPHTHALMIC at 08:12

## 2023-12-11 RX ADMIN — APIXABAN 2.5 MG: 2.5 TABLET, FILM COATED ORAL at 08:12

## 2023-12-11 RX ADMIN — MIDODRINE HYDROCHLORIDE 5 MG: 2.5 TABLET ORAL at 02:12

## 2023-12-11 RX ADMIN — MIDODRINE HYDROCHLORIDE 5 MG: 2.5 TABLET ORAL at 08:12

## 2023-12-11 RX ADMIN — ATORVASTATIN CALCIUM 40 MG: 40 TABLET, FILM COATED ORAL at 08:12

## 2023-12-11 RX ADMIN — LATANOPROST 1 DROP: 50 SOLUTION OPHTHALMIC at 08:12

## 2023-12-11 RX ADMIN — EPOETIN ALFA-EPBX 2400 UNITS: 10000 INJECTION, SOLUTION INTRAVENOUS; SUBCUTANEOUS at 11:12

## 2023-12-11 NOTE — PT/OT/SLP PROGRESS
Occupational Therapy      Patient Name:  Tyra Isaac   MRN:  [id number]    Patient not seen today secondary to  (In room dialysis.). Will follow-up tomorrow.    12/11/2023

## 2023-12-11 NOTE — PROGRESS NOTES
[facility] Medicine  Progress Note    Patient Name: Tyra Isaac  MRN: [id number]  Patient Class: IP- Inpatient   Admission Date: 12/4/2023  Length of Stay: 6 days  Attending Physician: Elieser Herrera MD  Primary Care Provider: Kalpesh Goel MD        Subjective:     Principal Problem:Atrial fibrillation        HPI:  Tyra Isaac is an 83 year old female with a past medical history of ESRD (MWF), Afib, HTN, anemia, and CAD who presented with worsening fatigue, myalgias, constipation, and cough. She was supposed to go to HD today, but instead she presented to the ED. She was discovered to have COVID-19 on workup with no evidence of pneumonia on CXR. Nephrology was consulted from the ED HD. Hospital Medicine was consulted for admission.    Overview/Hospital Course:  Tyra Isaac is an 83 year old female with a past medical history of ESRD (MWF), Afib, HTN, and CAD who presented with acute COVID-19 forcing her to miss HD. She has no evidence of pneumonia and is not in respiratory failure. While undergoing HD 12/4, she developed a narrow QRS complex tachycardia thought to be either SVT of Afib with RVR which resolved after adenosine and administration of a diltiazem infusion. She underwent routine HD. Cardiology was consulted and started the patient on PO sotalol as she was able to be weaned from diltiazem infusion. On 12/7, the patient developed Afib with RVR with hypotension which resolved with IV fluids administration, a dose of IV digoxin, and a bolus of IV amiodarone. The converted to NSR and her BP was at the lower limit of normal. Cardiology started PO diltiazem in addition to the PO sotalol and her vitals were monitored. She became hypotensive and bradycardic requiring addition of midodrine and IVF bolus with discontinuation of PO diltiazem and sotalol. Cardiology re-consulted. PT/OT recommended moderate intensity therapy so case management consulted to look into  SNF.    Interval History: Patient seen and examined. NAEON. No changes. Seen on HD.      Objective:     Vital Signs (Most Recent):  Temp: 98.3 °F (36.8 °C) (12/11/23 0920)  Pulse: (!) 50 (12/11/23 1100)  Resp: (!) 30 (12/11/23 1100)  BP: (!) 124/56 (12/11/23 1100)  SpO2: 99 % (12/11/23 1100) Vital Signs (24h Range):  Temp:  [97 °F (36.1 °C)-98.7 °F (37.1 °C)] 98.3 °F (36.8 °C)  Pulse:  [50-63] 50  Resp:  [20-39] 30  SpO2:  [96 %-100 %] 99 %  BP: ()/(42-99) 124/56     Weight: 50.6 kg (111 lb 8.8 oz)  Body mass index is 18.01 kg/m².    Intake/Output Summary (Last 24 hours) at 12/11/2023 1156  Last data filed at 12/11/2023 0608  Gross per 24 hour   Intake 790 ml   Output 1 ml   Net 789 ml         Physical Exam  Vitals and nursing note reviewed.   Constitutional:       General: She is not in acute distress.  HENT:      Head: Normocephalic and atraumatic.   Cardiovascular:      Rate and Rhythm: Regular rhythm. Bradycardia present.      Pulses: Normal pulses.      Heart sounds: Normal heart sounds.      Comments: RUE AVF with thrill.  Pulmonary:      Effort: Pulmonary effort is normal. No respiratory distress.      Breath sounds: Normal breath sounds.      Comments: RA.  Musculoskeletal:         General: Normal range of motion.      Cervical back: Neck supple.   Skin:     General: Skin is warm and dry.   Neurological:      General: No focal deficit present.      Mental Status: She is alert and oriented to person, place, and time. Mental status is at baseline.   Psychiatric:         Mood and Affect: Mood normal.         Behavior: Behavior normal.             Significant Labs: All pertinent labs within the past 24 hours have been reviewed.    Significant Imaging: I have reviewed all pertinent imaging results/findings within the past 24 hours.    Assessment/Plan:      * Atrial fibrillation  RVR resolved now sinus zane  Off rate and rhythm control meds due to bradycardia and hypotension  -continue eliquis  -monitor  vitals  -Telemetry  -Cardiology re-consulted    COVID-19  No evidence of pneumonia and respiratory failure.  -COVID-19 precautions    CAD s/p PCI  -Continue home statin, eliquis, BP control  -Telemetry    Anemia of chronic disease  Stable.  -Trend Hgb with CBC    Debility  - PT/OT; rec moderate intensity therapy  - CM consult for SNF    Benign hypertension with ESRD (end-stage renal disease)  BP stable  -Continue new midodrine  -Continue to monitor  -HD per Nephrology  -Renally dose medications      VTE Risk Mitigation (From admission, onward)           Ordered     apixaban tablet 2.5 mg  2 times daily         12/04/23 1145     IP VTE HIGH RISK PATIENT  Once         12/04/23 1145     Place sequential compression device  Until discontinued         12/04/23 1145     Reason for No Pharmacological VTE Prophylaxis  Once        Question:  Reasons:  Answer:  Already adequately anticoagulated on oral Anticoagulants    12/04/23 1145     heparin (porcine) injection 5,000 Units  As needed (PRN)         12/04/23 1145                    Discharge Planning   ILAN: 12/14/2023     Code Status: Full Code   Is the patient medically ready for discharge?:     Reason for patient still in hospital (select all that apply): Patient trending condition, Consult recommendations, and Pending disposition  Discharge Plan A: Home Health                  Elieser Herrera MD  Department of Hospital Medicine   [facility]

## 2023-12-11 NOTE — ASSESSMENT & PLAN NOTE
RVR resolved now sinus zane  Off rate and rhythm control meds due to bradycardia and hypotension  -continue eliquis  -monitor vitals  -Telemetry  -Cardiology re-consulted

## 2023-12-11 NOTE — PROGRESS NOTES
"[facility]  Department of Cardiology  Progress Note      PATIENT NAME: Tyra Isaac  MRN: [id number]  TODAY'S DATE: 12/11/2023  ADMIT DATE: 12/4/2023                          CONSULT REQUESTED BY: Elieser Herrera MD    SUBJECTIVE     PRINCIPAL PROBLEM: Atrial fibrillation      REASON FOR CONSULT:  SVT    INTERVAL HISTORY:    12/11/23:  Patient seen resting in bed on dialysis. Cardiology was reconsulted due to hypotension and bradycardia. Hospital medicine is requesting medication recommendations.     12/8/23  Feeling better, sitting up in bed drinking juice.  HR 40-50s overnight (likely asleep). Denies lightheadedness  Still has productive cough w/ phlegm     12/7/23:  Pt had episode of RVR 150s and hypotension, SBP 60s. IVFs given. IV digoxin and IV amiodarone 150 mg IV x 10 mins  Pt states she is feeling better now. Appears weak, coughing    12/6/23:  Pt has not any further episodes of RVR. She tolerated HD today w/o abnormal rhythm or tachycardia.     HPI: Pt is 82 yo female admitted for covid 19 as below. Cardiology consulted for SVT vs AF RVR during dialysis yesterday evening around 7 pm.    Pt is resting in bed. Denies chest pain or dyspnea on room air. Reports nausea and vomiting and generalized malaise    Hospitalist HPI: "Tyra Isaac is an 83 year old female with a past medical history of ESRD (MWF), Afib, HTN, anemia, and CAD who presented with worsening fatigue, myalgias, constipation, and cough. She was supposed to go to HD today, but instead she presented to the ED. She was discovered to have COVID-19 on workup with no evidence of pneumonia on CXR. Nephrology was consulted from the ED HD. Hospital Medicine was consulted for admission."     Notes from 12/4/23: 3:58 pm-   Patient was undergoing dialysis at the bedside when her heart rate began to increase on telemetry. EKG showed SVT. She received 6 mg followed by 12 mg of adenosine without improvement in her heart rate. " Cardiology was consulted and recommended a diltiazem bolus and infusion. The patient will be transferred to the stepdown unit.     Nurse reported pt converted to sinus zane around 11pm last night. She has been sinus zane since conversion.    Review of patient's allergies indicates:   Allergen Reactions    Cyclobenzaprine     Fish containing products Hives    Peanut Other (See Comments)    Tramadol Itching       Past Medical History:   Diagnosis Date    A-fib     Anxiety     Depression     Disorder of kidney and ureter     Encephalopathy acute 1/1/2018    End stage kidney disease 6/17/2017    Gout     Hyperlipidemia     Hypertension     Moderate episode of recurrent major depressive disorder 1/17/2018    Nephropathy hypertensive, 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    Osteopenia of multiple sites 3/9/2018    Based upon bone density measurements. Patient also has chronic kidney disease.    Stroke 11/2016     Past Surgical History:   Procedure Laterality Date    ANGIOGRAM, CORONARY, WITH LEFT HEART CATHETERIZATION N/A 2/7/2022    Procedure: Angiogram, Coronary, with Left Heart Cath;  Surgeon: Gino Leal MD;  Location: [facility] CATH/EP LAB;  Service: Cardiology;  Laterality: N/A;    CARDIAC SURGERY      stents    EYE SURGERY      WRIST SURGERY       Social History     Tobacco Use    Smoking status: Never    Smokeless tobacco: Never   Substance Use Topics    Alcohol use: No    Drug use: No        REVIEW OF SYSTEMS  Negative except as mentioned in HPI    OBJECTIVE     VITAL SIGNS (Most Recent)  Temp: 98 °F (36.7 °C) (12/11/23 1230)  Pulse: (!) 54 (12/11/23 1230)  Resp: (!) 25 (12/11/23 1230)  BP: (!) 140/62 (12/11/23 1230)  SpO2: 99 % (12/11/23 1230)    VENTILATION STATUS  Resp: (!) 25 (12/11/23 1230)  SpO2: 99 % (12/11/23 1230)           I & O (Last 24H):  Intake/Output Summary (Last  24 hours) at 12/11/2023 1304  Last data filed at 12/11/2023 1230  Gross per 24 hour   Intake 930 ml   Output 2001 ml   Net -1071 ml         WEIGHTS  Wt Readings from Last 3 Encounters:   12/04/23 2220 50.6 kg (111 lb 8.8 oz)   12/04/23 0636 48.1 kg (106 lb)   11/29/23 1040 48.5 kg (107 lb)   11/09/23 2243 52.2 kg (115 lb 1.3 oz)   11/09/23 1651 52.2 kg (115 lb)       PHYSICAL EXAM    GENERAL: elderly female resting in bed in no apparent distress.  HEENT: Normocephalic.    NECK: No JVD.   CARDIAC: Regular bradycardic rate and rhythm. S1 is normal.S2 is normal.No gallops, clicks or murmurs noted at this time.  CHEST ANATOMY: normal.   LUNGS: CTA  ABDOMEN: Soft, flat, thin, non tender. Normal bowel sounds.    EXTREMITIES: No edema  CENTRAL NERVOUS SYSTEM: AAO x 3  SKIN: No rash     HOME MEDICATIONS:  No current facility-administered medications on file prior to encounter.     Current Outpatient Medications on File Prior to Encounter   Medication Sig Dispense Refill    atorvastatin (LIPITOR) 40 MG tablet Take 40 mg by mouth once daily.      b complex vitamins tablet Take 1 tablet by mouth once daily.      calcium acetate,phosphat bind, (PHOSLO) 667 mg tablet Take 667 mg by mouth 2 (two) times daily with meals.      diltiaZEM (CARDIZEM CD) 120 MG Cp24 Take 1 capsule (120 mg total) by mouth 2 (two) times a day.      dorzolamide-timolol 2-0.5% (COSOPT) 22.3-6.8 mg/mL ophthalmic solution Place 1 drop into both eyes 2 (two) times daily.      ELIQUIS 2.5 mg Tab Take 2.5 mg by mouth 2 (two) times daily.      latanoprost 0.005 % ophthalmic solution Place 1 drop into both eyes every evening.      loperamide (IMODIUM A-D) 2 mg Tab Take 2 mg by mouth 4 (four) times daily as needed.      ondansetron (ZOFRAN) 4 MG tablet Take 1 tablet (4 mg total) by mouth every 8 (eight) hours as needed for Nausea. 15 tablet 0    sotaloL (BETAPACE) 80 MG tablet Take 1 tablet (80 mg total) by mouth 2 (two) times daily. 60 tablet 0       SCHEDULED  "MEDS:   apixaban  2.5 mg Oral BID    atorvastatin  40 mg Oral Daily    dorzolamide-timolol 2-0.5%  1 drop Both Eyes BID    epoetin rosy-epbx  50 Units/kg Intravenous Every Mon, Wed, Fri    latanoprost  1 drop Both Eyes QHS    midodrine  5 mg Oral TID    senna-docusate 8.6-50 mg  1 tablet Oral BID       CONTINUOUS INFUSIONS:      PRN MEDS:sodium chloride 0.9%, acetaminophen, heparin (porcine), LIDOcaine-prilocaine, melatonin, naloxone, ondansetron, prochlorperazine, sodium chloride 0.9%, sodium chloride 0.9%    LABS AND DIAGNOSTICS     CBC LAST 3 DAYS  Recent Labs   Lab 12/09/23  0800 12/10/23  0350 12/11/23  0405   WBC 6.39 6.71 8.48   RBC 3.50* 2.91* 2.79*   HGB 11.0* 9.1* 8.9*   HCT 35.2* 29.7* 28.1*   * 102* 101*   MCH 31.4* 31.3* 31.9*   MCHC 31.3* 30.6* 31.7*   RDW 15.0* 14.9* 14.8*    285 296   MPV 10.1 10.2 10.0   GRAN 50.8  3.2 51.1  3.4 60.2  5.1   LYMPH 23.9  1.5 22.5  1.5 17.0*  1.4   MONO 12.8  0.8 12.8  0.9 11.9  1.0   BASO 0.05 0.04 0.04   NRBC 0 0 0         COAGULATION LAST 3 DAYS  No results for input(s): "LABPT", "INR", "APTT" in the last 168 hours.    CHEMISTRY LAST 3 DAYS  Recent Labs   Lab 12/09/23  0343 12/10/23  0350 12/11/23  0405    136 134*   K 4.6 4.5 4.7    102 100   CO2 27 26 28   ANIONGAP 8 8 6*   BUN 27* 38* 44*   CREATININE 5.5* 7.1* 8.4*   GLU 94 93 94   CALCIUM 9.0 8.3* 8.2*   MG 2.0 2.1 2.1   ALBUMIN 3.2* 2.9* 2.9*   PROT 6.9 6.1 6.0   ALKPHOS 51* 50* 51*   ALT 16 18 23   AST 24 23 28   BILITOT 0.5 0.4 0.3         CARDIAC PROFILE LAST 3 DAYS  Recent Labs   Lab 12/05/23  0437            ENDOCRINE LAST 3 DAYS  No results for input(s): "TSH", "PROCAL" in the last 168 hours.    LAST ARTERIAL BLOOD GAS  ABG  No results for input(s): "PH", "PO2", "PCO2", "HCO3", "BE" in the last 168 hours.    LAST 7 DAYS MICROBIOLOGY   Microbiology Results (last 7 days)       Procedure Component Value Units Date/Time    Blood culture [[id number]] Collected: " 12/04/23 0713    Order Status: Completed Specimen: Blood Updated: 12/09/23 0832     Blood Culture, Routine No growth after 5 days.    Narrative:      Collection has been rescheduled by [email or web address] at 12/04/2023 07:14 Reason:   Done per phil  Collection has been rescheduled by [email or web address] at 12/04/2023 07:14 Reason:   Done per phil    Blood culture [[id number]] Collected: 12/04/23 0709    Order Status: Completed Specimen: Blood Updated: 12/09/23 0832     Blood Culture, Routine No growth after 5 days.    Narrative:      Collection has been rescheduled by [email or web address] at 12/04/2023 07:14 Reason:   Done per phil  Collection has been rescheduled by [email or web address] at 12/04/2023 07:14 Reason:   Done per phli            MOST RECENT IMAGING  X-Ray Chest AP Portable  XR CHEST 1 VIEW    CLINICAL HISTORY:  83 years Female Chest Pain    COMPARISON: November 9, 2023    FINDINGS: Cardiac silhouette size is within normal limits. Atherosclerotic calcification of the aorta. No confluent airspace disease. No pleural effusion or pneumothorax. No acute osseous abnormality.    IMPRESSION:    No acute pulmonary pathology.    Electronically signed by:  Aleksandar Castillo MD  12/04/2023 08:16 AM CST Workstation: [id number]      ECHOCARDIOGRAM RESULTS (last 5)  Results for orders placed during the hospital encounter of 03/20/23    Echo    Interpretation Summary  · The left ventricle is normal in size with moderate concentric hypertrophy and normal systolic function.  · The estimated ejection fraction is 57%.  · Normal left ventricular diastolic function.  · Normal right ventricular size with normal right ventricular systolic function.  · Moderate to severe tricuspid regurgitation.  · Mild-to-moderate mitral regurgitation.  · Moderate left atrial enlargement.  · Mild right atrial enlargement.  · There is mild pulmonary hypertension.      Results for orders placed during the hospital encounter of 03/06/23    Echo    Interpretation Summary  · The left ventricle is  normal in size with normal systolic function.  · The estimated ejection fraction is 60%.  · Grade II left ventricular diastolic dysfunction.  · Normal right ventricular size with normal right ventricular systolic function.  · Moderate left atrial enlargement.  · Mild-to-moderate mitral regurgitation.  · Moderate to severe tricuspid regurgitation.  · There is mild pulmonary hypertension.  · There is mild aortic valve stenosis.  · Aortic valve area is 1.78 cm2; peak velocity is 2.01 m/s; mean gradient is 9 mmHg.      Results for orders placed during the hospital encounter of 01/26/22    Echo    Interpretation Summary  · The left ventricle is normal in size with mild concentric hypertrophy and normal systolic function.  · The estimated ejection fraction is 65%.  · Grade II left ventricular diastolic dysfunction.  · Atrial fibrillation not observed.  · Normal right ventricular size with normal right ventricular systolic function.  · Moderate to severe left atrial enlargement.  · Mild right atrial enlargement.  · Mild mitral regurgitation.  · Mild to moderate tricuspid regurgitation.  · Normal central venous pressure (3 mmHg).  · The estimated PA systolic pressure is 36 mmHg.  · Mildly elevated gradient accross mitral valve of around 6 mmHg at HR of 64 bpm. MVA by pressure half time is normal, however this can be inaccurate.      Results for orders placed during the hospital encounter of 03/03/21    Echo Color Flow Doppler? Yes    Interpretation Summary  · The left ventricle is small with concentric remodeling and normal systolic function. The estimated ejection fraction is 63%  · The estimated PA systolic pressure is 38 mmHg.  · Grade II left ventricular diastolic dysfunction.  · Normal right ventricular size with normal right ventricular systolic function.  · Mild-to-moderate mitral regurgitation.  · Mild to moderate tricuspid regurgitation.  · Normal central venous pressure (3 mmHg).  · The patient converted from  atrial fibrillation to normal sinus rhythm 03/03/2021      Results for orders placed during the hospital encounter of 12/13/19    Echo Color Flow Doppler? Yes    Interpretation Summary  · Mild concentric left ventricular hypertrophy.  · Normal left ventricular systolic function. The estimated ejection fraction is 70%  · Grade II (moderate) left ventricular diastolic dysfunction consistent with pseudonormalization.  · The estimated PA systolic pressure is 37 mm Hg  · No wall motion abnormalities.  · Normal right ventricular systolic function.  · Normal central venous pressure (3 mm Hg).  · Mild mitral sclerosis.  · Mild mitral regurgitation.  · Mild to moderate tricuspid regurgitation.      CURRENT/PREVIOUS VISIT EKG  Results for orders placed or performed during the hospital encounter of 12/04/23   EKG 12-lead    Collection Time: 12/07/23 12:57 PM    Narrative    Test Reason : I48.0,    Vent. Rate : 060 BPM     Atrial Rate : 060 BPM     P-R Int : 156 ms          QRS Dur : 070 ms      QT Int : 494 ms       P-R-T Axes : 036 012 002 degrees     QTc Int : 494 ms    Normal sinus rhythm  Nonspecific ST abnormality  Prolonged QT  Abnormal ECG  When compared with ECG of 07-DEC-2023 10:47,  Sinus rhythm has replaced Atrial fibrillation  Vent. rate has decreased BY  83 BPM  Questionable change in QRS duration  Confirmed by Meche SOSA, Phillip SOUZA ([id number]) on 12/7/2023 9:56:21 PM    Referred By: [email or web address]   SELF           Confirmed By:Phillip Mcgregor MD           ASSESSMENT/PLAN:     Active Hospital Problems    Diagnosis    *Atrial fibrillation    COVID-19    CAD s/p PCI    Anemia of chronic disease    Debility    Benign hypertension with ESRD (end-stage renal disease)     BP slightly elevated1.21.15 BP elelvated. Took meds in [facility] will increase amlodipine to 10 mg per day. New prescription sent to the pharmacy. She will double the existing prescription. She will continue to monitor her blood pressures.         ASSESSMENT & PLAN:    Afib RVR  SVT  CAD  PAF  HTN  ESRD  Anemia  COVID 19      RECOMMENDATIONS:     Hold off on betablockers and antiarrhythmics for now due to bradycardia. Afib  and SVT tend to be primary triggered during dialysis treatments likely due to fluid and electrolyte fluctuations.   Recommend EP evaluation and consideration for pacemaker implantation. No indication for this urgently and she can be evaluated for this outpatient.   Continue Eliquis 2.5 mg po BID.   She is doing well on midodrine 5 mg po TID. Continue the same.   Will be available PRN. Thank you for the consultation.     Jacquie Carter NP  [facility]  Department of Cardiology  Date of Service: 12/11/2023      83-year-old lady with history of paroxysmal atrial fibrillation chronic kidney disease on hemodialysis. Patient has hypotensive episodes towards the later part of the dialysis treatment and atrial fibrillation is precipitated during these events.  Patient is allergic to seafood therefore amiodarone is not a choice.  And she has tried sotalol in the past and caused her to have profound bradycardia.    Currently she is maintaining sinus bradycardia with adequate blood pressure control.  Recommend to maintain on mitral drain but increase the dose to 5 mg 3 times a day and avoid hypotensive episodes.  Withhold any beta-blockers or antiarrhythmic therapy for the time being and manage her blood pressure better during hemodialysis    I have personally interviewed and examined the patient, I have reviewed the Nurse Practitioner's history and physical, assessment, and plan. I have personally evaluated the patient at bedside and agree with the findings and made appropriate changes as necessary in recommendations.    Jerson Leal MD  Department of Cardiology  [facility]  12/11/2023 1:11 PM

## 2023-12-11 NOTE — PROGRESS NOTES
INPATIENT NEPHROLOGY Progress  [facility] NEPHROLOGY    Tyra Isaac  12/11/2023    Reason for consultation:  ESRD    Chief Complaint:   Chief Complaint   Patient presents with    Vomiting    Cough    Fatigue     Started with weakness 2 days ago. Dialysis mwf, started vomiting this am.       History of Present Illness:    Per ER  83-year-old female with history of end-stage renal disease with dialysis on Monday Wednesday Friday followed by Dr. Maynard with right upper extremity AV fistula, last dialysis on Friday, atrial fibrillation on Eliquis, hypertension, hyperlipidemia, anxiety, gout, CVA, COPD.  Patient with recent diagnosis of pneumonia 2 weeks ago, completed antibiotics.  She presents emergency department with complaint of persistent cough, shortness of breath, generalized weakness, nausea, vomiting over last 2 days.  Patient states that she has generalized pain all over.  Was to go to dialysis today at 6:00 a.m. however came to the emergency department due to generalized weakness, cough, fatigue.  Her son who is her primary caretaker states that he feels his mother has too many medications to take this is also causing her to feel bad.     12/5  dialysis stopped yesterday due to SVT.  Back in SR.   Mild sob.  Generalized achiness.  No angina.    12/6  underwent dialysis with 1500 cc uf.  No arrhythmias during treatment  12/7 went back into SVT vs AFIB rvr this am.  Cardiology at the bedside.  No cp.  Mild light headedness    12/8  Back in sinus rhythm.  AFVSS.  S/p hd with 2 liter ultrafiltration  12/9  Phos 2.2, discontinued binder for now.  Will trend.  Pt generally feels bad, appetite not very good.  Encouraged PO intake.  12/10  pressures low, but stable.  PO4 1.9, got PO repletion this am. Sleeping, NAD noted.   12/11 VSS. HD today,    Plan of Care:     ESRD on HD MWF  --continue dialysis per routine  --fluid restrict  --renal dose medication per routine  --continue outpt medication  --continue  binders with meals    Anemia  --erythropoiesis stimulating agent with renal replacement therapy    Hyperphosphatemia--now hypophosphatemic, likely 2/2 poor PO intake.  Got repletion 12/10  --renal diet  --hold binders    Hypertension  Hypotension  --better now  --hold parameters with ultrafiltration    COVID  --management per primary team    Thank you for allowing us to participate in this patient's care. We will continue to follow.    Vital Signs:  Temp Readings from Last 3 Encounters:   12/11/23 98.3 °F (36.8 °C) (Axillary)   11/14/23 98.2 °F (36.8 °C) (Oral)   07/22/23 97.9 °F (36.6 °C) (Oral)       Pulse Readings from Last 3 Encounters:   12/11/23 (!) 55   11/29/23 (!) 50   11/14/23 68       BP Readings from Last 3 Encounters:   12/11/23 (!) 133/54   11/29/23 (!) 130/54   11/14/23 121/60       Weight:  Wt Readings from Last 3 Encounters:   12/04/23 50.6 kg (111 lb 8.8 oz)   11/29/23 48.5 kg (107 lb)   11/09/23 52.2 kg (115 lb 1.3 oz)       Past Medical & Surgical History:  Past Medical History:   Diagnosis Date    A-fib     Anxiety     Depression     Disorder of kidney and ureter     Encephalopathy acute 1/1/2018    End stage kidney disease 6/17/2017    Gout     Hyperlipidemia     Hypertension     Moderate episode of recurrent major depressive disorder 1/17/2018    Nephropathy hypertensive, 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    Osteopenia of multiple sites 3/9/2018    Based upon bone density measurements. Patient also has chronic kidney disease.    Stroke 11/2016       Past Surgical History:   Procedure Laterality Date    ANGIOGRAM, CORONARY, WITH LEFT HEART CATHETERIZATION N/A 2/7/2022    Procedure: Angiogram, Coronary, with Left Heart Cath;  Surgeon: Gino Leal MD;  Location: [facility] CATH/EP LAB;  Service: Cardiology;  Laterality: N/A;    CARDIAC SURGERY      stents    EYE  SURGERY      WRIST SURGERY         Past Social History:  Social History     Socioeconomic History    Marital status:      Spouse name: Aria Isaac    Number of children: 3   Occupational History    Occupation: Not working   Tobacco Use    Smoking status: Never    Smokeless tobacco: Never   Substance and Sexual Activity    Alcohol use: No    Drug use: No    Sexual activity: Not Currently     Social Determinants of Health     Financial Resource Strain: Medium Risk (6/15/2022)    Overall Financial Resource Strain (CARDIA)     Difficulty of Paying Living Expenses: Somewhat hard   Food Insecurity: No Food Insecurity (6/15/2022)    Hunger Vital Sign     Worried About Running Out of Food in the Last Year: Never true     Ran Out of Food in the Last Year: Never true   Transportation Needs: Unmet Transportation Needs (3/29/2023)    PRAPARE - Transportation     Lack of Transportation (Medical): Yes     Lack of Transportation (Non-Medical): No   Physical Activity: Inactive (6/15/2022)    Exercise Vital Sign     Days of Exercise per Week: 0 days     Minutes of Exercise per Session: 0 min   Stress: Stress Concern Present (6/15/2022)    [employer] [place] [employer] - Occupational Stress Questionnaire     Feeling of Stress : To some extent   Social Connections: Moderately Isolated (6/15/2022)    Social Connection and Isolation Panel [NHANES]     Frequency of Communication with Friends and Family: Three times a week     Frequency of Social Gatherings with Friends and Family: Three times a week     Attends Rastafarian Services: 1 to 4 times per year     Active Member of Clubs or Organizations: No     Attends Club or Organization Meetings: Never     Marital Status:    Housing Stability: Low Risk  (6/15/2022)    Housing Stability Vital Sign     Unable to Pay for Housing in the Last Year: No     Number of Places Lived in the Last Year: 1     Unstable Housing in the Last Year: No       Medications:  No  "current facility-administered medications on file prior to encounter.     Current Outpatient Medications on File Prior to Encounter   Medication Sig Dispense Refill    atorvastatin (LIPITOR) 40 MG tablet Take 40 mg by mouth once daily.      b complex vitamins tablet Take 1 tablet by mouth once daily.      calcium acetate,phosphat bind, (PHOSLO) 667 mg tablet Take 667 mg by mouth 2 (two) times daily with meals.      diltiaZEM (CARDIZEM CD) 120 MG Cp24 Take 1 capsule (120 mg total) by mouth 2 (two) times a day.      dorzolamide-timolol 2-0.5% (COSOPT) 22.3-6.8 mg/mL ophthalmic solution Place 1 drop into both eyes 2 (two) times daily.      ELIQUIS 2.5 mg Tab Take 2.5 mg by mouth 2 (two) times daily.      latanoprost 0.005 % ophthalmic solution Place 1 drop into both eyes every evening.      loperamide (IMODIUM A-D) 2 mg Tab Take 2 mg by mouth 4 (four) times daily as needed.      ondansetron (ZOFRAN) 4 MG tablet Take 1 tablet (4 mg total) by mouth every 8 (eight) hours as needed for Nausea. 15 tablet 0    sotaloL (BETAPACE) 80 MG tablet Take 1 tablet (80 mg total) by mouth 2 (two) times daily. 60 tablet 0     Scheduled Meds:  Continuous Infusions:  PRN Meds:.    Allergies:  Cyclobenzaprine, Fish containing products, Peanut, and Tramadol    Past Family History:  Reviewed; refer to Hospitalist Admission Note    Review of Systems:  Review of Systems - All 14 systems reviewed and negative, except as noted in HPI    Physical Exam:    BP (!) 133/54   Pulse (!) 55   Temp 98.3 °F (36.8 °C) (Axillary)   Resp (!) 22   Ht 5' 6" (1.676 m)   Wt 50.6 kg (111 lb 8.8 oz)   SpO2 99%   Breastfeeding No   BMI 18.01 kg/m²     General Appearance:    Ill appearing.     Head:    Normocephalic, without obvious abnormality, atraumatic   Eyes:    PER, conjunctiva/corneas clear, EOM's intact in both eyes        Throat:   Lips, mucosa, and tongue normal; teeth and gums normal   Back:     Symmetric, no curvature, ROM normal, no CVA " "tenderness   Lungs:     Clear to auscultation bilaterally, respirations unlabored   Chest wall:    No tenderness or deformity   Heart:    Regular rate and rhythm, S1 and S2 normal, no murmur, rub   or gallop   Abdomen:     Soft, non-tender, bowel sounds active all four quadrants,     no masses, no organomegaly   Extremities:   Diminished breath sounds   Pulses:   2+ and symmetric all extremities   MSK:   No joint or muscle swelling, tenderness or deformity   Skin:   Skin color, texture, turgor normal, no rashes or lesions   Neurologic:   CNII-XII intact, normal strength and sensation       Throughout.  No flap     Results:  Recent Labs   Lab 12/09/23  0343 12/10/23  0350 12/11/23  0405    136 134*   K 4.6 4.5 4.7    102 100   CO2 27 26 28   BUN 27* 38* 44*   CREATININE 5.5* 7.1* 8.4*   GLU 94 93 94       Recent Labs   Lab 12/09/23  0343 12/10/23  0350 12/11/23  0405   CALCIUM 9.0 8.3* 8.2*   ALBUMIN 3.2* 2.9* 2.9*   PHOS 2.2* 1.9* 2.4*   MG 2.0 2.1 2.1             No results for input(s): "POCTGLUCOSE" in the last 168 hours.    Recent Labs   Lab 01/26/22  1525   Hemoglobin A1C 5.3       Recent Labs   Lab 12/09/23  0800 12/10/23  0350 12/11/23  0405   WBC 6.39 6.71 8.48   HGB 11.0* 9.1* 8.9*   HCT 35.2* 29.7* 28.1*    285 296   * 102* 101*   MCHC 31.3* 30.6* 31.7*   MONO 12.8  0.8 12.8  0.9 11.9  1.0   EOSINOPHIL 10.6* 12.1* 9.6*       Recent Labs   Lab 12/09/23  0343 12/10/23  0350 12/11/23  0405   BILITOT 0.5 0.4 0.3   PROT 6.9 6.1 6.0   ALBUMIN 3.2* 2.9* 2.9*   ALKPHOS 51* 50* 51*   ALT 16 18 23   AST 24 23 28       Recent Labs   Lab 03/31/23  1046 11/09/23 2028 12/04/23  0959   Color, UA Yellow Yellow Yellow   Appearance, UA Clear Clear Clear   pH, UA 8.0 >8.0 A 8.0   Specific Gravity, UA 1.010 1.015 1.015   Protein, UA 1+ A 2+ A 2+ A   Glucose, UA Negative Trace A 1+ A   Ketones, UA Negative Negative Trace A   Urobilinogen, UA Negative 2.0-3.0 A Negative   Bilirubin (UA) Negative " Negative Negative   Occult Blood UA Negative Trace A 2+ A   Nitrite, UA Negative Negative Negative   RBC, UA 0 1 41 H   WBC, UA 0 0 2   Bacteria Negative Negative Negative   Hyaline Casts, UA 1 3 A 6 A       Recent Labs   Lab 11/10/23  1518 11/10/23  1529   POC PH 7.395 7.391   POC PCO2 36.5 37.2   POC HCO3 22.4 L 22.6 L   POC PO2 54 LL 53 LL   POC SATURATED O2 88 87   POC BE -3 L -2   Sample ARTERIAL ARTERIAL       Microbiology Results (last 7 days)       Procedure Component Value Units Date/Time    Blood culture [[id number]] Collected: 12/04/23 0713    Order Status: Completed Specimen: Blood Updated: 12/09/23 0832     Blood Culture, Routine No growth after 5 days.    Narrative:      Collection has been rescheduled by SMA9 at 12/04/2023 07:14 Reason:   Done per phil  Collection has been rescheduled by SMA9 at 12/04/2023 07:14 Reason:   Done per phil    Blood culture [[id number]] Collected: 12/04/23 0709    Order Status: Completed Specimen: Blood Updated: 12/09/23 0832     Blood Culture, Routine No growth after 5 days.    Narrative:      Collection has been rescheduled by SMA9 at 12/04/2023 07:14 Reason:   Done per phil  Collection has been rescheduled by SMA9 at 12/04/2023 07:14 Reason:   Done per phil          Patient care was time spent personally by me on the following activities:     Obtaining a history  Examination of patient.  Providing medical care at the patients bedside.  Developing a treatment plan with patient or surrogate and bedside caregivers  Ordering and reviewing laboratory studies, radiographic studies, pulse oximetry.  Ordering and performing treatments and interventions.  Evaluation of patient's response to treatment.  Discussions with consultants while on the unit and immediately available to the patient.  Re-evaluation of the patient's condition.  Documentation in the medical record.     Raleigh Yee MD  Nephrology  [facility] [place]  [phone]

## 2023-12-11 NOTE — CARE UPDATE
12/11/23 0830   Patient Assessment/Suction   Level of Consciousness (AVPU) alert   Respiratory Effort Unlabored   Expansion/Accessory Muscles/Retractions expansion symmetric;no retractions;no use of accessory muscles   Rhythm/Pattern, Respiratory depth regular;pattern regular;unlabored   PRE-TX-O2   Device (Oxygen Therapy) room air   SpO2 99 %   Pulse Oximetry Type Continuous   $ Pulse Oximetry - Multiple Charge Pulse Oximetry - Multiple   Pulse (!) 59   Resp (!) 22   Education   $ Education 15 min   Respiratory Evaluation   $ Care Plan Tech Time 15 min        [Consultation] : a consultation visit

## 2023-12-11 NOTE — PT/OT/SLP PROGRESS
Physical Therapy      Patient Name:  Tyra Isaac   MRN:  [id number]    Patient not seen today secondary to Dialysis. Will follow-up .

## 2023-12-11 NOTE — PLAN OF CARE
[facility]  Discharge Reassessment    Primary Care Provider: Kalpesh Goel MD    Expected Discharge Date: 12/14/2023    SW spoke with patient's son Raffi Isaac [phone] regarding SNF placement.  Per Raffi, he and patient are in agreement with SNF placement with [facility] as choice.  VICENTE sent patient information to [facility], [facility], and [facility] via Personally system.    Locet called in to [facility] [phone] alex faxed to [phone] for state approval.  Fax confirmation received.      Reassessment (most recent)       Discharge Reassessment - 12/11/23 7735          Discharge Reassessment    Assessment Type Discharge Planning Reassessment     Did the patient's condition or plan change since previous assessment? Yes     Discharge Plan discussed with: Adult children     Communicated ILAN with patient/caregiver Yes     Discharge Plan A Skilled Nursing Facility     Discharge Plan B Home Health     DME Needed Upon Discharge  none     Transition of Care Barriers None     Why the patient remains in the hospital Requires continued medical care        Post-Acute Status    Post-Acute Authorization Placement     Post-Acute Placement Status Referrals Sent     Patient choice form signed by patient/caregiver List with quality metrics by geographic area provided

## 2023-12-11 NOTE — ASSESSMENT & PLAN NOTE
BP stable  -Continue new midodrine  -Continue to monitor  -HD per Nephrology  -Renally dose medications

## 2023-12-11 NOTE — SUBJECTIVE & OBJECTIVE
Interval History: Patient seen and examined. NAEON. No changes. Seen on HD.      Objective:     Vital Signs (Most Recent):  Temp: 98.3 °F (36.8 °C) (12/11/23 0920)  Pulse: (!) 50 (12/11/23 1100)  Resp: (!) 30 (12/11/23 1100)  BP: (!) 124/56 (12/11/23 1100)  SpO2: 99 % (12/11/23 1100) Vital Signs (24h Range):  Temp:  [97 °F (36.1 °C)-98.7 °F (37.1 °C)] 98.3 °F (36.8 °C)  Pulse:  [50-63] 50  Resp:  [20-39] 30  SpO2:  [96 %-100 %] 99 %  BP: ()/(42-99) 124/56     Weight: 50.6 kg (111 lb 8.8 oz)  Body mass index is 18.01 kg/m².    Intake/Output Summary (Last 24 hours) at 12/11/2023 1156  Last data filed at 12/11/2023 0608  Gross per 24 hour   Intake 790 ml   Output 1 ml   Net 789 ml         Physical Exam  Vitals and nursing note reviewed.   Constitutional:       General: She is not in acute distress.  HENT:      Head: Normocephalic and atraumatic.   Cardiovascular:      Rate and Rhythm: Regular rhythm. Bradycardia present.      Pulses: Normal pulses.      Heart sounds: Normal heart sounds.      Comments: RUE AVF with thrill.  Pulmonary:      Effort: Pulmonary effort is normal. No respiratory distress.      Breath sounds: Normal breath sounds.      Comments: RA.  Musculoskeletal:         General: Normal range of motion.      Cervical back: Neck supple.   Skin:     General: Skin is warm and dry.   Neurological:      General: No focal deficit present.      Mental Status: She is alert and oriented to person, place, and time. Mental status is at baseline.   Psychiatric:         Mood and Affect: Mood normal.         Behavior: Behavior normal.             Significant Labs: All pertinent labs within the past 24 hours have been reviewed.    Significant Imaging: I have reviewed all pertinent imaging results/findings within the past 24 hours.

## 2023-12-12 LAB
ALBUMIN SERPL BCP-MCNC: 3.1 G/DL (ref 3.5–5.2)
ALP SERPL-CCNC: 58 U/L (ref 55–135)
ALT SERPL W/O P-5'-P-CCNC: 26 U/L (ref 10–44)
ANION GAP SERPL CALC-SCNC: 8 MMOL/L (ref 8–16)
AST SERPL-CCNC: 31 U/L (ref 10–40)
BASOPHILS # BLD AUTO: 0.04 K/UL (ref 0–0.2)
BASOPHILS NFR BLD: 0.5 % (ref 0–1.9)
BILIRUB SERPL-MCNC: 0.3 MG/DL (ref 0.1–1)
BUN SERPL-MCNC: 24 MG/DL (ref 8–23)
CALCIUM SERPL-MCNC: 9 MG/DL (ref 8.7–10.5)
CHLORIDE SERPL-SCNC: 103 MMOL/L (ref 95–110)
CO2 SERPL-SCNC: 23 MMOL/L (ref 23–29)
CREAT SERPL-MCNC: 6.1 MG/DL (ref 0.5–1.4)
DIFFERENTIAL METHOD BLD: ABNORMAL
EOSINOPHIL # BLD AUTO: 0.8 K/UL (ref 0–0.5)
EOSINOPHIL NFR BLD: 9.8 % (ref 0–8)
ERYTHROCYTE [DISTWIDTH] IN BLOOD BY AUTOMATED COUNT: 15.3 % (ref 11.5–14.5)
EST. GFR  (NO RACE VARIABLE): 6.4 ML/MIN/1.73 M^2
GLUCOSE SERPL-MCNC: 118 MG/DL (ref 70–110)
HCT VFR BLD AUTO: 32.3 % (ref 37–48.5)
HGB BLD-MCNC: 9.9 G/DL (ref 12–16)
IMM GRANULOCYTES # BLD AUTO: 0.17 K/UL (ref 0–0.04)
IMM GRANULOCYTES NFR BLD AUTO: 2 % (ref 0–0.5)
LYMPHOCYTES # BLD AUTO: 1.6 K/UL (ref 1–4.8)
LYMPHOCYTES NFR BLD: 18.1 % (ref 18–48)
MAGNESIUM SERPL-MCNC: 2.1 MG/DL (ref 1.6–2.6)
MCH RBC QN AUTO: 31.1 PG (ref 27–31)
MCHC RBC AUTO-ENTMCNC: 30.7 G/DL (ref 32–36)
MCV RBC AUTO: 102 FL (ref 82–98)
MONOCYTES # BLD AUTO: 1.1 K/UL (ref 0.3–1)
MONOCYTES NFR BLD: 13.2 % (ref 4–15)
NEUTROPHILS # BLD AUTO: 4.8 K/UL (ref 1.8–7.7)
NEUTROPHILS NFR BLD: 56.4 % (ref 38–73)
NRBC BLD-RTO: 0 /100 WBC
PHOSPHATE SERPL-MCNC: 2.7 MG/DL (ref 2.7–4.5)
PLATELET # BLD AUTO: 326 K/UL (ref 150–450)
PMV BLD AUTO: 10.1 FL (ref 9.2–12.9)
POTASSIUM SERPL-SCNC: 4.6 MMOL/L (ref 3.5–5.1)
PROT SERPL-MCNC: 6.7 G/DL (ref 6–8.4)
RBC # BLD AUTO: 3.18 M/UL (ref 4–5.4)
SODIUM SERPL-SCNC: 134 MMOL/L (ref 136–145)
WBC # BLD AUTO: 8.54 K/UL (ref 3.9–12.7)

## 2023-12-12 PROCEDURE — 97530 THERAPEUTIC ACTIVITIES: CPT

## 2023-12-12 PROCEDURE — 84100 ASSAY OF PHOSPHORUS: CPT | Performed by: STUDENT IN AN ORGANIZED HEALTH CARE EDUCATION/TRAINING PROGRAM

## 2023-12-12 PROCEDURE — 99900031 HC PATIENT EDUCATION (STAT)

## 2023-12-12 PROCEDURE — 94761 N-INVAS EAR/PLS OXIMETRY MLT: CPT

## 2023-12-12 PROCEDURE — 83735 ASSAY OF MAGNESIUM: CPT | Performed by: STUDENT IN AN ORGANIZED HEALTH CARE EDUCATION/TRAINING PROGRAM

## 2023-12-12 PROCEDURE — 21000000 HC CCU ICU ROOM CHARGE

## 2023-12-12 PROCEDURE — 36415 COLL VENOUS BLD VENIPUNCTURE: CPT | Performed by: STUDENT IN AN ORGANIZED HEALTH CARE EDUCATION/TRAINING PROGRAM

## 2023-12-12 PROCEDURE — 94799 UNLISTED PULMONARY SVC/PX: CPT

## 2023-12-12 PROCEDURE — 90935 HEMODIALYSIS ONE EVALUATION: CPT

## 2023-12-12 PROCEDURE — 99900035 HC TECH TIME PER 15 MIN (STAT)

## 2023-12-12 PROCEDURE — 97535 SELF CARE MNGMENT TRAINING: CPT

## 2023-12-12 PROCEDURE — 80053 COMPREHEN METABOLIC PANEL: CPT | Performed by: STUDENT IN AN ORGANIZED HEALTH CARE EDUCATION/TRAINING PROGRAM

## 2023-12-12 PROCEDURE — 97110 THERAPEUTIC EXERCISES: CPT

## 2023-12-12 PROCEDURE — 25000003 PHARM REV CODE 250: Performed by: STUDENT IN AN ORGANIZED HEALTH CARE EDUCATION/TRAINING PROGRAM

## 2023-12-12 PROCEDURE — 85025 COMPLETE CBC W/AUTO DIFF WBC: CPT | Performed by: STUDENT IN AN ORGANIZED HEALTH CARE EDUCATION/TRAINING PROGRAM

## 2023-12-12 PROCEDURE — 97116 GAIT TRAINING THERAPY: CPT

## 2023-12-12 RX ADMIN — MIDODRINE HYDROCHLORIDE 5 MG: 2.5 TABLET ORAL at 03:12

## 2023-12-12 RX ADMIN — APIXABAN 2.5 MG: 2.5 TABLET, FILM COATED ORAL at 10:12

## 2023-12-12 RX ADMIN — ATORVASTATIN CALCIUM 40 MG: 40 TABLET, FILM COATED ORAL at 08:12

## 2023-12-12 RX ADMIN — APIXABAN 2.5 MG: 2.5 TABLET, FILM COATED ORAL at 08:12

## 2023-12-12 RX ADMIN — LATANOPROST 1 DROP: 50 SOLUTION OPHTHALMIC at 10:12

## 2023-12-12 RX ADMIN — DORZOLAMIDE HYDROCHLORIDE AND TIMOLOL MALEATE 1 DROP: 20; 5 SOLUTION/ DROPS OPHTHALMIC at 10:12

## 2023-12-12 RX ADMIN — MIDODRINE HYDROCHLORIDE 5 MG: 2.5 TABLET ORAL at 08:12

## 2023-12-12 NOTE — PLAN OF CARE
Problem: Occupational Therapy  Goal: Occupational Therapy Goal  Description: Goals to be met by: 1/9/24     Patient will increase functional independence with ADLs by performing:    UE Dressing with Supervision.  LE Dressing with Supervision and Assistive Devices as needed.  Grooming while standing at sink with Supervision.  Toileting from toilet with Supervision for hygiene and clothing management.   Toilet transfer to toilet with Supervision.    Outcome: Ongoing, Progressing

## 2023-12-12 NOTE — PT/OT/SLP PROGRESS
"Physical Therapy Treatment    Patient Name:  Tyra Isaac   MRN:  [id number]    Recommendations:     Discharge Recommendations: Moderate Intensity Therapy  Discharge Equipment Recommendations: to be determined by next level of care  Barriers to discharge: Decreased caregiver support    Assessment:     Tyra Isaac is a 83 y.o. female admitted with a medical diagnosis of Atrial fibrillation.  She presents with the following impairments/functional limitations: weakness, impaired endurance, impaired self care skills, impaired functional mobility, gait instability, impaired cardiopulmonary response to activity .    Pt presented sitting in chair.  Her sa02 was 100 % on RA. Pt t/f'ed to stand with CGA and ambulated in the  room 10 ft x2 RW and CGA taking frequent breaks for conversation. Pt returned to chair and after a brief rest, she  participated in B LE therex  tolerating therapy well.    Rehab Prognosis: Good; patient would benefit from acute skilled PT services to address these deficits and reach maximum level of function.    Recent Surgery: * No surgery found *      Plan:     During this hospitalization, patient to be seen 6 x/week to address the identified rehab impairments via gait training, therapeutic activities, therapeutic exercises and progress toward the following goals:    Plan of Care Expires:  01/08/24    Subjective     Chief Complaint: None   Patient/Family Comments/goals: Return to  PLOF  " I was walking all over my house," PTA  Pain/Comfort:  Pain Rating 1: 0/10      Objective:     Communicated with nnurse prior to session.  Patient found up in chair with telemetry, peripheral IV upon PT entry to room.     General Precautions: Standard, fall, droplet, contact, airborne  Orthopedic Precautions: N/A  Braces: N/A  Respiratory Status: Room air     Functional Mobility:  Transfers:     Sit to Stand:  contact guard assistance with rolling walker  Gait: 10 ft x2 RW and CGA , extra slow " pace      AM-PAC 6 CLICK MOBILITY          Treatment & Education:  1x10 reps B LE therex in sitting to include LAQs, APs, marches, t/f  and gait as indicated above    Patient left up in chair with all lines intact and call button in reach..    GOALS:   Multidisciplinary Problems       Physical Therapy Goals          Problem: Physical Therapy    Goal Priority Disciplines Outcome Goal Variances Interventions   Physical Therapy Goal     PT, PT/OT Ongoing, Progressing     Description: Goals to be met by: 24     Patient will increase functional independence with mobility by performin. Supine to sit with Supervision  2. Sit to stand transfer with Supervision  3. Bed to chair transfer with Supervision using Rolling Walker  4. Gait  x 150 feet with Supervision using Rolling Walker.                              Time Tracking:     PT Received On: 23  PT Start Time: 1040     PT Stop Time: 1104  PT Total Time (min): 24 min     Billable Minutes: Gait Training 12 minutes  and Therapeutic Exercise 12 minutes    Treatment Type: Treatment  PT/PTA: PT     Number of PTA visits since last PT visit: 1     2023

## 2023-12-12 NOTE — PROGRESS NOTES
INPATIENT NEPHROLOGY Progress  [facility] NEPHROLOGY    Tyra Isaac  12/12/2023    Reason for consultation:  ESRD    Chief Complaint:   Chief Complaint   Patient presents with    Vomiting    Cough    Fatigue     Started with weakness 2 days ago. Dialysis mwf, started vomiting this am.       History of Present Illness:    Per ER  83-year-old female with history of end-stage renal disease with dialysis on Monday Wednesday Friday followed by Dr. Maynard with right upper extremity AV fistula, last dialysis on Friday, atrial fibrillation on Eliquis, hypertension, hyperlipidemia, anxiety, gout, CVA, COPD.  Patient with recent diagnosis of pneumonia 2 weeks ago, completed antibiotics.  She presents emergency department with complaint of persistent cough, shortness of breath, generalized weakness, nausea, vomiting over last 2 days.  Patient states that she has generalized pain all over.  Was to go to dialysis today at 6:00 a.m. however came to the emergency department due to generalized weakness, cough, fatigue.  Her son who is her primary caretaker states that he feels his mother has too many medications to take this is also causing her to feel bad.     12/5  dialysis stopped yesterday due to SVT.  Back in SR.   Mild sob.  Generalized achiness.  No angina.    12/6  underwent dialysis with 1500 cc uf.  No arrhythmias during treatment  12/7 went back into SVT vs AFIB rvr this am.  Cardiology at the bedside.  No cp.  Mild light headedness    12/8  Back in sinus rhythm.  AFVSS.  S/p hd with 2 liter ultrafiltration  12/9  Phos 2.2, discontinued binder for now.  Will trend.  Pt generally feels bad, appetite not very good.  Encouraged PO intake.  12/10  pressures low, but stable.  PO4 1.9, got PO repletion this am. Sleeping, NAD noted.   12/11 VSS. HD today,  12/12 VSS. HD tomorrow. Add phos binders when Phos > 4-5.    Plan of Care:     ESRD on HD MWF  --continue dialysis per routine  --fluid restrict  --renal dose  medication per routine  --continue outpt medication  --continue binders with meals    Anemia  --erythropoiesis stimulating agent with renal replacement therapy    Hyperphosphatemia--now hypophosphatemic, likely 2/2 poor PO intake.  Got repletion 12/10  --renal diet  --hold binders  --add phos binders when Phos > 4-5.    Hypertension  Hypotension  --better now  --hold parameters with ultrafiltration    COVID  --management per primary team    Thank you for allowing us to participate in this patient's care. We will continue to follow.    Vital Signs:  Temp Readings from Last 3 Encounters:   12/12/23 97.8 °F (36.6 °C) (Axillary)   11/14/23 98.2 °F (36.8 °C) (Oral)   07/22/23 97.9 °F (36.6 °C) (Oral)       Pulse Readings from Last 3 Encounters:   12/12/23 (!) 56   11/29/23 (!) 50   11/14/23 68       BP Readings from Last 3 Encounters:   12/12/23 (!) 131/56   11/29/23 (!) 130/54   11/14/23 121/60       Weight:  Wt Readings from Last 3 Encounters:   12/04/23 50.6 kg (111 lb 8.8 oz)   11/29/23 48.5 kg (107 lb)   11/09/23 52.2 kg (115 lb 1.3 oz)       Past Medical & Surgical History:  Past Medical History:   Diagnosis Date    A-fib     Anxiety     Depression     Disorder of kidney and ureter     Encephalopathy acute 1/1/2018    End stage kidney disease 6/17/2017    Gout     Hyperlipidemia     Hypertension     Moderate episode of recurrent major depressive disorder 1/17/2018    Nephropathy hypertensive, 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    Osteopenia of multiple sites 3/9/2018    Based upon bone density measurements. Patient also has chronic kidney disease.    Stroke 11/2016       Past Surgical History:   Procedure Laterality Date    ANGIOGRAM, CORONARY, WITH LEFT HEART CATHETERIZATION N/A 2/7/2022    Procedure: Angiogram, Coronary, with Left Heart Cath;  Surgeon: Gino Leal MD;   Location: [facility] CATH/EP LAB;  Service: Cardiology;  Laterality: N/A;    CARDIAC SURGERY      stents    EYE SURGERY      WRIST SURGERY         Past Social History:  Social History     Socioeconomic History    Marital status:      Spouse name: Aria Isaac    Number of children: 3   Occupational History    Occupation: Not working   Tobacco Use    Smoking status: Never    Smokeless tobacco: Never   Substance and Sexual Activity    Alcohol use: No    Drug use: No    Sexual activity: Not Currently     Social Determinants of Health     Financial Resource Strain: Medium Risk (6/15/2022)    Overall Financial Resource Strain (CARDIA)     Difficulty of Paying Living Expenses: Somewhat hard   Food Insecurity: No Food Insecurity (6/15/2022)    Hunger Vital Sign     Worried About Running Out of Food in the Last Year: Never true     Ran Out of Food in the Last Year: Never true   Transportation Needs: Unmet Transportation Needs (3/29/2023)    PRAPARE - Transportation     Lack of Transportation (Medical): Yes     Lack of Transportation (Non-Medical): No   Physical Activity: Inactive (6/15/2022)    Exercise Vital Sign     Days of Exercise per Week: 0 days     Minutes of Exercise per Session: 0 min   Stress: Stress Concern Present (6/15/2022)    [employer] [place] [employer] - Occupational Stress Questionnaire     Feeling of Stress : To some extent   Social Connections: Moderately Isolated (6/15/2022)    Social Connection and Isolation Panel [NHANES]     Frequency of Communication with Friends and Family: Three times a week     Frequency of Social Gatherings with Friends and Family: Three times a week     Attends Orthodox Services: 1 to 4 times per year     Active Member of Clubs or Organizations: No     Attends Club or Organization Meetings: Never     Marital Status:    Housing Stability: Low Risk  (6/15/2022)    Housing Stability Vital Sign     Unable to Pay for Housing in the Last Year: No      "Number of Places Lived in the Last Year: 1     Unstable Housing in the Last Year: No       Medications:  No current facility-administered medications on file prior to encounter.     Current Outpatient Medications on File Prior to Encounter   Medication Sig Dispense Refill    atorvastatin (LIPITOR) 40 MG tablet Take 40 mg by mouth once daily.      b complex vitamins tablet Take 1 tablet by mouth once daily.      calcium acetate,phosphat bind, (PHOSLO) 667 mg tablet Take 667 mg by mouth 2 (two) times daily with meals.      diltiaZEM (CARDIZEM CD) 120 MG Cp24 Take 1 capsule (120 mg total) by mouth 2 (two) times a day.      dorzolamide-timolol 2-0.5% (COSOPT) 22.3-6.8 mg/mL ophthalmic solution Place 1 drop into both eyes 2 (two) times daily.      ELIQUIS 2.5 mg Tab Take 2.5 mg by mouth 2 (two) times daily.      latanoprost 0.005 % ophthalmic solution Place 1 drop into both eyes every evening.      loperamide (IMODIUM A-D) 2 mg Tab Take 2 mg by mouth 4 (four) times daily as needed.      ondansetron (ZOFRAN) 4 MG tablet Take 1 tablet (4 mg total) by mouth every 8 (eight) hours as needed for Nausea. 15 tablet 0    sotaloL (BETAPACE) 80 MG tablet Take 1 tablet (80 mg total) by mouth 2 (two) times daily. 60 tablet 0     Scheduled Meds:  Continuous Infusions:  PRN Meds:.    Allergies:  Cyclobenzaprine, Fish containing products, Peanut, and Tramadol    Past Family History:  Reviewed; refer to Hospitalist Admission Note    Review of Systems:  Review of Systems - All 14 systems reviewed and negative, except as noted in HPI    Physical Exam:    BP (!) 131/56   Pulse (!) 56   Temp 97.8 °F (36.6 °C) (Axillary)   Resp (!) 27   Ht 5' 6" (1.676 m)   Wt 50.6 kg (111 lb 8.8 oz)   SpO2 98%   Breastfeeding No   BMI 18.01 kg/m²     General Appearance:    Ill appearing.     Head:    Normocephalic, without obvious abnormality, atraumatic   Eyes:    PER, conjunctiva/corneas clear, EOM's intact in both eyes        Throat:   Lips, " "mucosa, and tongue normal; teeth and gums normal   Back:     Symmetric, no curvature, ROM normal, no CVA tenderness   Lungs:     Clear to auscultation bilaterally, respirations unlabored   Chest wall:    No tenderness or deformity   Heart:    Regular rate and rhythm, S1 and S2 normal, no murmur, rub   or gallop   Abdomen:     Soft, non-tender, bowel sounds active all four quadrants,     no masses, no organomegaly   Extremities:   Diminished breath sounds   Pulses:   2+ and symmetric all extremities   MSK:   No joint or muscle swelling, tenderness or deformity   Skin:   Skin color, texture, turgor normal, no rashes or lesions   Neurologic:   CNII-XII intact, normal strength and sensation       Throughout.  No flap     Results:  Recent Labs   Lab 12/10/23  0350 12/11/23  0405 12/12/23  0336    134* 134*   K 4.5 4.7 4.6    100 103   CO2 26 28 23   BUN 38* 44* 24*   CREATININE 7.1* 8.4* 6.1*   GLU 93 94 118*         Recent Labs   Lab 12/10/23  0350 12/11/23  0405 12/12/23  0336   CALCIUM 8.3* 8.2* 9.0   ALBUMIN 2.9* 2.9* 3.1*   PHOS 1.9* 2.4* 2.7   MG 2.1 2.1 2.1               No results for input(s): "POCTGLUCOSE" in the last 168 hours.    Recent Labs   Lab 01/26/22  1525   Hemoglobin A1C 5.3         Recent Labs   Lab 12/10/23  0350 12/11/23  0405 12/12/23  0336   WBC 6.71 8.48 8.54   HGB 9.1* 8.9* 9.9*   HCT 29.7* 28.1* 32.3*    296 326   * 101* 102*   MCHC 30.6* 31.7* 30.7*   MONO 12.8  0.9 11.9  1.0 13.2  1.1*   EOSINOPHIL 12.1* 9.6* 9.8*         Recent Labs   Lab 12/10/23  0350 12/11/23 0405 12/12/23  0336   BILITOT 0.4 0.3 0.3   PROT 6.1 6.0 6.7   ALBUMIN 2.9* 2.9* 3.1*   ALKPHOS 50* 51* 58   ALT 18 23 26   AST 23 28 31         Recent Labs   Lab 03/31/23  1046 11/09/23 2028 12/04/23  0959   Color, UA Yellow Yellow Yellow   Appearance, UA Clear Clear Clear   pH, UA 8.0 >8.0 A 8.0   Specific Gravity, UA 1.010 1.015 1.015   Protein, UA 1+ A 2+ A 2+ A   Glucose, UA Negative Trace A 1+ A "   Ketones, UA Negative Negative Trace A   Urobilinogen, UA Negative 2.0-3.0 A Negative   Bilirubin (UA) Negative Negative Negative   Occult Blood UA Negative Trace A 2+ A   Nitrite, UA Negative Negative Negative   RBC, UA 0 1 41 H   WBC, UA 0 0 2   Bacteria Negative Negative Negative   Hyaline Casts, UA 1 3 A 6 A         Recent Labs   Lab 11/10/23  1518 11/10/23  1529   POC PH 7.395 7.391   POC PCO2 36.5 37.2   POC HCO3 22.4 L 22.6 L   POC PO2 54 LL 53 LL   POC SATURATED O2 88 87   POC BE -3 L -2   Sample ARTERIAL ARTERIAL         Microbiology Results (last 7 days)       Procedure Component Value Units Date/Time    Blood culture [[id number]] Collected: 12/04/23 0713    Order Status: Completed Specimen: Blood Updated: 12/09/23 0832     Blood Culture, Routine No growth after 5 days.    Narrative:      Collection has been rescheduled by [email or web address] at 12/04/2023 07:14 Reason:   Done per phil  Collection has been rescheduled by SMA9 at 12/04/2023 07:14 Reason:   Done per phil    Blood culture [[id number]] Collected: 12/04/23 0709    Order Status: Completed Specimen: Blood Updated: 12/09/23 0832     Blood Culture, Routine No growth after 5 days.    Narrative:      Collection has been rescheduled by [email or web address] at 12/04/2023 07:14 Reason:   Done per phil  Collection has been rescheduled by SMA9 at 12/04/2023 07:14 Reason:   Done per phil          Patient care was time spent personally by me on the following activities:     Obtaining a history  Examination of patient.  Providing medical care at the patients bedside.  Developing a treatment plan with patient or surrogate and bedside caregivers  Ordering and reviewing laboratory studies, radiographic studies, pulse oximetry.  Ordering and performing treatments and interventions.  Evaluation of patient's response to treatment.  Discussions with consultants while on the unit and immediately available to the patient.  Re-evaluation of the patient's condition.  Documentation in  the medical record.     Raleigh Yee MD  Nephrology  [facility] [place]  [phone]

## 2023-12-12 NOTE — PROGRESS NOTES
[facility]  Progress Note    Patient Name: Tyra Isaac  MRN: [id number]  Patient Class: IP- Inpatient   Admission Date: 12/4/2023  Length of Stay: 7 days  Attending Physician: Elieser Herrera MD  Primary Care Provider: Kalpesh Goel MD        Subjective:     Principal Problem:Atrial fibrillation        HPI:  Tyra Isaac is an 83 year old female with a past medical history of ESRD (MWF), Afib, HTN, anemia, and CAD who presented with worsening fatigue, myalgias, constipation, and cough. She was supposed to go to HD today, but instead she presented to the ED. She was discovered to have COVID-19 on workup with no evidence of pneumonia on CXR. Nephrology was consulted from the ED HD. Hospital Medicine was consulted for admission.    Overview/Hospital Course:  Tyra Isaac is an 83 year old female with a past medical history of ESRD (MWF), Afib, HTN, and CAD who presented with acute COVID-19 forcing her to miss HD. She has no evidence of pneumonia and is not in respiratory failure. While undergoing HD 12/4, she developed a narrow QRS complex tachycardia thought to be either SVT of Afib with RVR which resolved after adenosine and administration of a diltiazem infusion. She underwent routine HD. Cardiology was consulted and started the patient on PO sotalol as she was able to be weaned from diltiazem infusion. On 12/7, the patient developed Afib with RVR with hypotension which resolved with IV fluids administration, a dose of IV digoxin, and a bolus of IV amiodarone. The converted to NSR and her BP was at the lower limit of normal. Cardiology started PO diltiazem in addition to the PO sotalol and her vitals were monitored. She became hypotensive and bradycardic requiring addition of midodrine and IVF bolus with discontinuation of PO diltiazem and sotalol. Cardiology re-consulted and recommended to continue current regimen and f/u outpatient w/ EP referral. PT/OT recommended  moderate intensity therapy. Pending SNF placement delayed due to COVID isolation.    Interval History: Patient seen and examined. YAQUELIN Needs 10 days since COVID+ test before going to SNF so anticipate discharge 12/14-12/15.      Objective:     Vital Signs (Most Recent):  Temp: 98.1 °F (36.7 °C) (12/12/23 1100)  Pulse: (!) 50 (12/12/23 1100)  Resp: (!) 22 (12/12/23 1100)  BP: (!) 107/45 (12/12/23 1100)  SpO2: 100 % (12/12/23 1100) Vital Signs (24h Range):  Temp:  [97.8 °F (36.6 °C)-99.3 °F (37.4 °C)] 98.1 °F (36.7 °C)  Pulse:  [50-58] 50  Resp:  [20-36] 22  SpO2:  [97 %-100 %] 100 %  BP: ()/(40-59) 107/45     Weight: 50.6 kg (111 lb 8.8 oz)  Body mass index is 18.01 kg/m².    Intake/Output Summary (Last 24 hours) at 12/12/2023 1337  Last data filed at 12/12/2023 0853  Gross per 24 hour   Intake 600 ml   Output 0 ml   Net 600 ml         Physical Exam  Vitals and nursing note reviewed.   Constitutional:       General: She is not in acute distress.  HENT:      Head: Normocephalic and atraumatic.   Cardiovascular:      Rate and Rhythm: Regular rhythm. Bradycardia present.      Pulses: Normal pulses.      Heart sounds: Normal heart sounds.      Comments: RUE AVF with thrill.  Pulmonary:      Effort: Pulmonary effort is normal. No respiratory distress.      Breath sounds: Normal breath sounds.      Comments: RA.  Musculoskeletal:         General: Normal range of motion.      Cervical back: Neck supple.   Skin:     General: Skin is warm and dry.   Neurological:      General: No focal deficit present.      Mental Status: She is alert and oriented to person, place, and time. Mental status is at baseline.   Psychiatric:         Mood and Affect: Mood normal.         Behavior: Behavior normal.             Significant Labs: All pertinent labs within the past 24 hours have been reviewed.    Significant Imaging: I have reviewed all pertinent imaging results/findings within the past 24 hours.    Assessment/Plan:      *  Atrial fibrillation  RVR resolved now sinus zane  Off rate and rhythm control meds due to bradycardia and hypotension  -continue eliquis  -monitor vitals  -Telemetry  -Cardiology re-consulted; appreciate recs to continue current care and f/u outpatient w/ EP referral    COVID-19  No evidence of pneumonia and respiratory failure.  -COVID-19 precautions    CAD s/p PCI  -Continue home statin, eliquis, BP control  -Telemetry    Anemia of chronic disease  Stable.  -Trend Hgb with CBC    Debility  - PT/OT; rec moderate intensity therapy  - CM consult for SNF; delayed due to COVID.    Benign hypertension with ESRD (end-stage renal disease)  BP stable  -Continue new midodrine  -Continue to monitor  -HD per Nephrology  -Renally dose medications      VTE Risk Mitigation (From admission, onward)           Ordered     apixaban tablet 2.5 mg  2 times daily         12/04/23 1145     IP VTE HIGH RISK PATIENT  Once         12/04/23 1145     Place sequential compression device  Until discontinued         12/04/23 1145     Reason for No Pharmacological VTE Prophylaxis  Once        Question:  Reasons:  Answer:  Already adequately anticoagulated on oral Anticoagulants    12/04/23 1145     heparin (porcine) injection 5,000 Units  As needed (PRN)         12/04/23 1145                    Discharge Planning   ILAN: 12/14/2023     Code Status: Full Code   Is the patient medically ready for discharge?: Yes    Reason for patient still in hospital (select all that apply): Pending disposition  Discharge Plan A: Skilled Nursing Facility                  Elieser Herrera MD  Department of Hospital Medicine   [facility]

## 2023-12-12 NOTE — PLAN OF CARE
[facility] [place] Phone: [phone]   -    12/11/2023 23:29 (CT)  Response: No, unable to accept patient  Reason: Care Needs Exceed Current Capacity        [facility] Phone: [phone]   --   12/11/2023 15:59 (CT)  Response: Referral Received  Comments: Unable to take until 10 full days of isolation are complete       [facility] Phone: [phone]   --   12/11/2023 18:02 (CT)  Response: Referral Received  Comments: Patients packet has been sent to our team for clinical review. We are not in network with this insurance. If you have received 3 in network denials, please let me know. Rey Torres [phone]     VICENTE sent patient information to [facility] vannessa Nair, [facility], and Lindy via [employer] system for review.    142 obtained and placed in careDirectly.       12/12/23 1107   Post-Acute Status   Post-Acute Authorization Placement   Post-Acute Placement Status Pending post-acute provider review/more information requested

## 2023-12-12 NOTE — PROGRESS NOTES
"[facility]  Adult Nutrition   Progress Note (Follow-Up)    SUMMARY     Recommendations  1) Continue renal diet as tolerated and continue to encourage intake.   2) Continue Suplena with meals (to provide 450 kcals, 11gm protein per carton).   3) RD to continue to monitor and provide recommendatins PRN.    Goals:   1) Pt to consume/tolerate >75% of meals and ONS.   2) Pt to gradually gain +1-2lbs/week to healthy BMI range.    Nutrition Goal Status: new    Dietitian Rounds Brief  F/U Nutrition Note: Due to Covid 19 restrictions can not go in pts room so I called her son Chao and left a message with him to call me regarding his mothers PO intake today since he has supposedly been in to see her. Her nurse said she is drinking 120 mls of her ONS TID and 25% of her meals and that should be adequate to meet pts energy and protein needs at least 100%. Her LBM was today and per her new labs she has just slight hypophosphatemia but this is improving (1.9 to 2.4). She is not on a phosphate binder either. Will continue follow up prn.    Diet order:   Current Diet Order: Renal      Evaluation of Received Nutrient/Fluid Intake  Energy Calories Required: not meeting needs  Protein Required: not meeting needs  Fluid Required: meeting needs  Tolerance: not tolerating     % Intake of Estimated Energy Needs: 25 - 50 %  % Meal Intake: 0 - 25 %      Intake/Output Summary (Last 24 hours) at 2023 1829  Last data filed at 2023 1754  Gross per 24 hour   Intake 1770 ml   Output 2000 ml   Net -230 ml        Anthropometrics  Temp: 98.5 °F (36.9 °C)  Height Method: Stated  Height: 5' 6" (167.6 cm)  Height (inches): 66 in  Weight Method: Bed Scale  Weight: 50.6 kg (111 lb 8.8 oz)  Weight (lb): 111.55 lb  Ideal Body Weight (IBW), Female: 130 lb  % Ideal Body Weight, Female (lb): 85.81 %  BMI (Calculated): 18  BMI Grade: 17 - 18.4 protein-energy malnutrition grade I  Usual Body Weight (UBW), k kg (12/3)  % Usual " Body Weight: 90.55       Estimated/Assessed Needs  Weight Used For Calorie Calculations: 50.6 kg (111 lb 8.8 oz)  Energy Calorie Requirements (kcal): 9973-0708 kcals/day (30-35 kcals/kg ABW)  Energy Need Method: Kcal/kg (30-35)  Protein Requirements: 71-89 g/day (1.2-1.5 g/kg IBW)  Weight Used For Protein Calculations: 59 kg (130 lb 1.1 oz)  Fluid Requirements (mL): urine output + 1000mL  Estimated Fluid Requirement Method: RDA Method  RDA Method (mL): 1518       Reason for Assessment  Reason For Assessment: RD follow-up (Atrial fibrillation)  Diagnosis: other (see comments) (Atrial fibrillation)  Relevant Medical History: end-stage renal disease with dialysis on Monday Wednesday Friday followed by Dr. Maynard with right upper extremity AV fistula, last dialysis on Friday, atrial fibrillation on Eliquis, hypertension, hyperlipidemia, anxiety, gout, CVA, COPD  General Information Comments: Pt presents emergency department with complaint of persistent cough, shortness of breath, generalized weakness, nausea, vomiting over last 2 days. Noted +COVID. Pt with fair intake of meals per chart review. Pt agreed to Nepro with meals. No GI distress. LBM 12/3. Skin: intact per chart. Wt reviewed. UBW 56kg (2022), CBW 50.6kg reflecting 10% wt loss x 1 year, not significant. NFPE not completed d/t COVID19 to prevent exposure and spread of disease. Pt at risk for malnutrition given varied intakes and possible muscle/fat loss.    Nutrition/Diet History  Spiritual, Cultural Beliefs, Moravian Practices, Values that Affect Care: no  Food Allergies: fish, peanut  Factors Affecting Nutritional Intake: nausea/vomiting    Nutrition Risk Screen  Nutrition Risk Screen: no indicators present     MST Score: 0  Have you recently lost weight without trying?: No  Weight loss score: 0  Have you been eating poorly because of a decreased appetite?: No  Appetite score: 0       Weight History:  Wt Readings from Last 5 Encounters:   12/04/23 50.6 kg  "(111 lb 8.8 oz)   11/29/23 48.5 kg (107 lb)   11/09/23 52.2 kg (115 lb 1.3 oz)   07/22/23 52.2 kg (115 lb)   07/18/23 51.5 kg (113 lb 9.6 oz)        Lab/Procedures/Meds: Pertinent Labs/Meds Reviewed    Medications:Pertinent Medications Reviewed  Scheduled Meds:   apixaban  2.5 mg Oral BID    atorvastatin  40 mg Oral Daily    dorzolamide-timolol 2-0.5%  1 drop Both Eyes BID    epoetin rosy-epbx  50 Units/kg Intravenous Every Mon, Wed, Fri    latanoprost  1 drop Both Eyes QHS    midodrine  5 mg Oral TID    senna-docusate 8.6-50 mg  1 tablet Oral BID     Continuous Infusions:  PRN Meds:.sodium chloride 0.9%, acetaminophen, heparin (porcine), LIDOcaine-prilocaine, melatonin, naloxone, ondansetron, prochlorperazine, sodium chloride 0.9%, sodium chloride 0.9%    Labs: Pertinent Labs Reviewed  Clinical Chemistry:  Recent Labs   Lab 12/09/23  0343 12/10/23  0350 12/11/23  0405    136 134*   K 4.6 4.5 4.7    102 100   CO2 27 26 28   GLU 94 93 94   BUN 27* 38* 44*   CREATININE 5.5* 7.1* 8.4*   CALCIUM 9.0 8.3* 8.2*   PROT 6.9 6.1 6.0   ALBUMIN 3.2* 2.9* 2.9*   BILITOT 0.5 0.4 0.3   ALKPHOS 51* 50* 51*   AST 24 23 28   ALT 16 18 23   ANIONGAP 8 8 6*   MG 2.0 2.1 2.1   PHOS 2.2* 1.9* 2.4*     CBC:   Recent Labs   Lab 12/11/23  0405   WBC 8.48   RBC 2.79*   HGB 8.9*   HCT 28.1*      *   MCH 31.9*   MCHC 31.7*     Lipid Panel:  No results for input(s): "CHOL", "HDL", "LDLCALC", "TRIG", "CHOLHDL" in the last 168 hours.  Cardiac Profile:  No results for input(s): "BNP", "CPK", "CPKMB", "TROPONINI", "CKTOTAL" in the last 168 hours.  Inflammatory Labs:  Recent Labs   Lab 12/05/23  0437   .0*     Diabetes:  No results for input(s): "HGBA1C", "POCTGLUCOSE" in the last 168 hours.  Thyroid & Parathyroid:  No results for input(s): "TSH", "FREET4", "P2BYRHL", "C1MJCFP", "THYROIDAB" in the last 168 hours.    Monitor and Evaluation  Food and Nutrient Intake: energy intake, food and beverage intake  Food and " Nutrient Adminstration: diet order  Knowledge/Beliefs/Attitudes: food and nutrition knowledge/skill  Physical Activity and Function: nutrition-related ADLs and IADLs  Anthropometric Measurements: weight, weight change  Biochemical Data, Medical Tests and Procedures: electrolyte and renal panel, gastrointestinal profile, glucose/endocrine profile, other (specify) (renal profile)  Nutrition-Focused Physical Findings: overall appearance     Nutrition Risk  Level of Risk/Frequency of Follow-up: moderate - high     Nutrition Follow-Up  RD Follow-up?: Yes      Zenobia Ross RD 12/11/2023 6:29 PM

## 2023-12-12 NOTE — CARE UPDATE
12/11/23 1913   Patient Assessment/Suction   Level of Consciousness (AVPU) alert   Respiratory Effort Unlabored;Normal   Expansion/Accessory Muscles/Retractions no use of accessory muscles;no retractions   PRE-TX-O2   Device (Oxygen Therapy) room air   SpO2 99 %   Pulse Oximetry Type Continuous   Pulse (!) 56   Resp (!) 36

## 2023-12-12 NOTE — SUBJECTIVE & OBJECTIVE
Interval History: Patient seen and examined. YAQUELIN Needs 10 days since COVID+ test before going to SNF so anticipate discharge 12/14-12/15.      Objective:     Vital Signs (Most Recent):  Temp: 98.1 °F (36.7 °C) (12/12/23 1100)  Pulse: (!) 50 (12/12/23 1100)  Resp: (!) 22 (12/12/23 1100)  BP: (!) 107/45 (12/12/23 1100)  SpO2: 100 % (12/12/23 1100) Vital Signs (24h Range):  Temp:  [97.8 °F (36.6 °C)-99.3 °F (37.4 °C)] 98.1 °F (36.7 °C)  Pulse:  [50-58] 50  Resp:  [20-36] 22  SpO2:  [97 %-100 %] 100 %  BP: ()/(40-59) 107/45     Weight: 50.6 kg (111 lb 8.8 oz)  Body mass index is 18.01 kg/m².    Intake/Output Summary (Last 24 hours) at 12/12/2023 1337  Last data filed at 12/12/2023 0853  Gross per 24 hour   Intake 600 ml   Output 0 ml   Net 600 ml         Physical Exam  Vitals and nursing note reviewed.   Constitutional:       General: She is not in acute distress.  HENT:      Head: Normocephalic and atraumatic.   Cardiovascular:      Rate and Rhythm: Regular rhythm. Bradycardia present.      Pulses: Normal pulses.      Heart sounds: Normal heart sounds.      Comments: RUE AVF with thrill.  Pulmonary:      Effort: Pulmonary effort is normal. No respiratory distress.      Breath sounds: Normal breath sounds.      Comments: RA.  Musculoskeletal:         General: Normal range of motion.      Cervical back: Neck supple.   Skin:     General: Skin is warm and dry.   Neurological:      General: No focal deficit present.      Mental Status: She is alert and oriented to person, place, and time. Mental status is at baseline.   Psychiatric:         Mood and Affect: Mood normal.         Behavior: Behavior normal.             Significant Labs: All pertinent labs within the past 24 hours have been reviewed.    Significant Imaging: I have reviewed all pertinent imaging results/findings within the past 24 hours.

## 2023-12-12 NOTE — PT/OT/SLP PROGRESS
Occupational Therapy   Treatment    Name: Tyra Isaac  MRN: [id number]  Admitting Diagnosis:  Atrial fibrillation       Recommendations:     Discharge Recommendations: Moderate Intensity Therapy  Discharge Equipment Recommendations:  bath bench  Barriers to discharge:   (hypotension, falls risk)    Assessment:     Tyra Isaac is a 83 y.o. female with a medical diagnosis of Atrial fibrillation.  She presents with improving medical acuity and functional mobility. Patient participated in bed mobility, bed/chair transfer and LB dressing sitting in chair. Performance deficits affecting function are weakness, impaired endurance, impaired self care skills, impaired functional mobility, gait instability, impaired balance, decreased safety awareness, impaired cardiopulmonary response to activity.     Rehab Prognosis:  Fair; patient would benefit from acute skilled OT services to address these deficits and reach maximum level of function.       Plan:     Patient to be seen 5 x/week to address the above listed problems via self-care/home management, therapeutic activities, therapeutic exercises  Plan of Care Expires: 01/07/24  Plan of Care Reviewed with: patient    Subjective     Chief Complaint: General weakness  Patient/Family Comments/goals: Improved blood pressure, functional mobility and ADL independence.  Pain/Comfort:  Pain Rating 1: 0/10  Pain Rating Post-Intervention 1: 0/10    Objective:     Communicated with: nurse prior to session.  Patient found HOB elevated with telemetry, peripheral IV upon OT entry to room.    General Precautions: Standard, fall    Orthopedic Precautions:N/A  Braces: N/A  Respiratory Status: Room air     Occupational Performance:     Bed Mobility:    Patient completed Scooting/Bridging with contact guard assistance  Patient completed Supine to Sit with contact guard assistance     Functional Mobility/Transfers:  Patient completed Bed <> Chair Transfer using Step Transfer technique  with contact guard assistance with rolling walker  Functional Mobility: ambulated 5 feet from bed to chair with contact guard assistance using rolling walker.    Activities of Daily Living:  Lower Body Dressing: contact guard assistance to don/doff socks sitting in chair.      [facility] 6 Click ADL: 19    Treatment & Education:  Patient's /56 at end of session sitting in chair.     Patient left up in chair with all lines intact and call button in reach    GOALS:   Multidisciplinary Problems       Occupational Therapy Goals          Problem: Occupational Therapy    Goal Priority Disciplines Outcome Interventions   Occupational Therapy Goal     OT, PT/OT Ongoing, Progressing    Description: Goals to be met by: 1/9/24     Patient will increase functional independence with ADLs by performing:    UE Dressing with Supervision.  LE Dressing with Supervision and Assistive Devices as needed.  Grooming while standing at sink with Supervision.  Toileting from toilet with Supervision for hygiene and clothing management.   Toilet transfer to toilet with Supervision.                         Time Tracking:     OT Date of Treatment: 12/12/23  OT Start Time: 1012  OT Stop Time: 1036  OT Total Time (min): 24 min    Billable Minutes:Self Care/Home Management 12  Therapeutic Activity 12    OT/ANSHUL: OT          12/12/2023

## 2023-12-12 NOTE — ASSESSMENT & PLAN NOTE
RVR resolved now sinus zane  Off rate and rhythm control meds due to bradycardia and hypotension  -continue eliquis  -monitor vitals  -Telemetry  -Cardiology re-consulted; appreciate recs to continue current care and f/u outpatient w/ EP referral

## 2023-12-13 LAB
ALBUMIN SERPL BCP-MCNC: 3.1 G/DL (ref 3.5–5.2)
ALP SERPL-CCNC: 57 U/L (ref 55–135)
ALT SERPL W/O P-5'-P-CCNC: 26 U/L (ref 10–44)
ANION GAP SERPL CALC-SCNC: 6 MMOL/L (ref 8–16)
AST SERPL-CCNC: 29 U/L (ref 10–40)
BASOPHILS # BLD AUTO: 0.03 K/UL (ref 0–0.2)
BASOPHILS NFR BLD: 0.4 % (ref 0–1.9)
BILIRUB SERPL-MCNC: 0.3 MG/DL (ref 0.1–1)
BUN SERPL-MCNC: 33 MG/DL (ref 8–23)
CALCIUM SERPL-MCNC: 9 MG/DL (ref 8.7–10.5)
CHLORIDE SERPL-SCNC: 103 MMOL/L (ref 95–110)
CO2 SERPL-SCNC: 22 MMOL/L (ref 23–29)
CREAT SERPL-MCNC: 7.9 MG/DL (ref 0.5–1.4)
DIFFERENTIAL METHOD BLD: ABNORMAL
EOSINOPHIL # BLD AUTO: 0.8 K/UL (ref 0–0.5)
EOSINOPHIL NFR BLD: 10.3 % (ref 0–8)
ERYTHROCYTE [DISTWIDTH] IN BLOOD BY AUTOMATED COUNT: 15.5 % (ref 11.5–14.5)
EST. GFR  (NO RACE VARIABLE): 4.7 ML/MIN/1.73 M^2
GLUCOSE SERPL-MCNC: 89 MG/DL (ref 70–110)
HCT VFR BLD AUTO: 30.2 % (ref 37–48.5)
HGB BLD-MCNC: 9.6 G/DL (ref 12–16)
IMM GRANULOCYTES # BLD AUTO: 0.16 K/UL (ref 0–0.04)
IMM GRANULOCYTES NFR BLD AUTO: 2.2 % (ref 0–0.5)
LYMPHOCYTES # BLD AUTO: 1.6 K/UL (ref 1–4.8)
LYMPHOCYTES NFR BLD: 21.9 % (ref 18–48)
MAGNESIUM SERPL-MCNC: 2.3 MG/DL (ref 1.6–2.6)
MCH RBC QN AUTO: 32.2 PG (ref 27–31)
MCHC RBC AUTO-ENTMCNC: 31.8 G/DL (ref 32–36)
MCV RBC AUTO: 101 FL (ref 82–98)
MONOCYTES # BLD AUTO: 1.1 K/UL (ref 0.3–1)
MONOCYTES NFR BLD: 14.5 % (ref 4–15)
NEUTROPHILS # BLD AUTO: 3.7 K/UL (ref 1.8–7.7)
NEUTROPHILS NFR BLD: 50.7 % (ref 38–73)
NRBC BLD-RTO: 0 /100 WBC
PHOSPHATE SERPL-MCNC: 3.6 MG/DL (ref 2.7–4.5)
PLATELET # BLD AUTO: 309 K/UL (ref 150–450)
PMV BLD AUTO: 9.6 FL (ref 9.2–12.9)
POTASSIUM SERPL-SCNC: 5 MMOL/L (ref 3.5–5.1)
PROT SERPL-MCNC: 6.6 G/DL (ref 6–8.4)
RBC # BLD AUTO: 2.98 M/UL (ref 4–5.4)
SODIUM SERPL-SCNC: 131 MMOL/L (ref 136–145)
WBC # BLD AUTO: 7.31 K/UL (ref 3.9–12.7)

## 2023-12-13 PROCEDURE — 85025 COMPLETE CBC W/AUTO DIFF WBC: CPT | Performed by: STUDENT IN AN ORGANIZED HEALTH CARE EDUCATION/TRAINING PROGRAM

## 2023-12-13 PROCEDURE — 25000003 PHARM REV CODE 250: Performed by: STUDENT IN AN ORGANIZED HEALTH CARE EDUCATION/TRAINING PROGRAM

## 2023-12-13 PROCEDURE — 83735 ASSAY OF MAGNESIUM: CPT | Performed by: STUDENT IN AN ORGANIZED HEALTH CARE EDUCATION/TRAINING PROGRAM

## 2023-12-13 PROCEDURE — 21000000 HC CCU ICU ROOM CHARGE

## 2023-12-13 PROCEDURE — 36415 COLL VENOUS BLD VENIPUNCTURE: CPT | Performed by: STUDENT IN AN ORGANIZED HEALTH CARE EDUCATION/TRAINING PROGRAM

## 2023-12-13 PROCEDURE — 99900031 HC PATIENT EDUCATION (STAT)

## 2023-12-13 PROCEDURE — 63600175 PHARM REV CODE 636 W HCPCS: Mod: JZ | Performed by: STUDENT IN AN ORGANIZED HEALTH CARE EDUCATION/TRAINING PROGRAM

## 2023-12-13 PROCEDURE — 84100 ASSAY OF PHOSPHORUS: CPT | Performed by: STUDENT IN AN ORGANIZED HEALTH CARE EDUCATION/TRAINING PROGRAM

## 2023-12-13 PROCEDURE — 94761 N-INVAS EAR/PLS OXIMETRY MLT: CPT

## 2023-12-13 PROCEDURE — 90935 HEMODIALYSIS ONE EVALUATION: CPT

## 2023-12-13 PROCEDURE — 80053 COMPREHEN METABOLIC PANEL: CPT | Performed by: STUDENT IN AN ORGANIZED HEALTH CARE EDUCATION/TRAINING PROGRAM

## 2023-12-13 RX ADMIN — ATORVASTATIN CALCIUM 40 MG: 40 TABLET, FILM COATED ORAL at 12:12

## 2023-12-13 RX ADMIN — APIXABAN 2.5 MG: 2.5 TABLET, FILM COATED ORAL at 08:12

## 2023-12-13 RX ADMIN — APIXABAN 2.5 MG: 2.5 TABLET, FILM COATED ORAL at 12:12

## 2023-12-13 RX ADMIN — DORZOLAMIDE HYDROCHLORIDE AND TIMOLOL MALEATE 1 DROP: 20; 5 SOLUTION/ DROPS OPHTHALMIC at 08:12

## 2023-12-13 RX ADMIN — LATANOPROST 1 DROP: 50 SOLUTION OPHTHALMIC at 08:12

## 2023-12-13 RX ADMIN — MIDODRINE HYDROCHLORIDE 5 MG: 2.5 TABLET ORAL at 08:12

## 2023-12-13 RX ADMIN — EPOETIN ALFA-EPBX 2400 UNITS: 10000 INJECTION, SOLUTION INTRAVENOUS; SUBCUTANEOUS at 10:12

## 2023-12-13 RX ADMIN — SENNOSIDES AND DOCUSATE SODIUM 1 TABLET: 8.6; 5 TABLET ORAL at 08:12

## 2023-12-13 NOTE — SUBJECTIVE & OBJECTIVE
Interval History: Patient seen and examined. NAEON. Stable no changes.      Objective:     Vital Signs (Most Recent):  Temp: 98.2 °F (36.8 °C) (12/13/23 0845)  Pulse: (!) 55 (12/13/23 1130)  Resp: (!) 21 (12/13/23 1120)  BP: (!) 111/56 (12/13/23 1130)  SpO2: 99 % (12/13/23 1120) Vital Signs (24h Range):  Temp:  [98.1 °F (36.7 °C)-99.1 °F (37.3 °C)] 98.2 °F (36.8 °C)  Pulse:  [52-64] 55  Resp:  [20-35] 21  SpO2:  [95 %-100 %] 99 %  BP: ()/(39-94) 111/56     Weight: 50.6 kg (111 lb 8.8 oz)  Body mass index is 18.01 kg/m².    Intake/Output Summary (Last 24 hours) at 12/13/2023 1307  Last data filed at 12/13/2023 0840  Gross per 24 hour   Intake 420 ml   Output 0 ml   Net 420 ml         Physical Exam  Vitals and nursing note reviewed.   Constitutional:       General: She is not in acute distress.  HENT:      Head: Normocephalic and atraumatic.   Cardiovascular:      Rate and Rhythm: Regular rhythm. Bradycardia present.      Pulses: Normal pulses.      Heart sounds: Normal heart sounds.      Comments: RUE AVF with thrill.  Pulmonary:      Effort: Pulmonary effort is normal. No respiratory distress.      Breath sounds: Normal breath sounds.      Comments: RA.  Musculoskeletal:         General: Normal range of motion.      Cervical back: Neck supple.   Skin:     General: Skin is warm and dry.   Neurological:      General: No focal deficit present.      Mental Status: She is alert and oriented to person, place, and time. Mental status is at baseline.   Psychiatric:         Mood and Affect: Mood normal.         Behavior: Behavior normal.             Significant Labs: All pertinent labs within the past 24 hours have been reviewed.    Significant Imaging: I have reviewed all pertinent imaging results/findings within the past 24 hours.

## 2023-12-13 NOTE — PT/OT/SLP PROGRESS
Occupational Therapy      Patient Name:  Tyra Isaac   MRN:  [id number]    Patient not seen today secondary to Dialysis.     12/13/2023

## 2023-12-13 NOTE — ASSESSMENT & PLAN NOTE
- PT/OT; rec moderate intensity therapy  - CM consult for SNF; delayed due to COVID (needs 10 days since positive test).

## 2023-12-13 NOTE — PROGRESS NOTES
[facility] Medicine  Progress Note    Patient Name: Trya Isaac  MRN: [id number]  Patient Class: IP- Inpatient   Admission Date: 12/4/2023  Length of Stay: 8 days  Attending Physician: Elieser Herrera MD  Primary Care Provider: Kalpesh Goel MD        Subjective:     Principal Problem:Atrial fibrillation        HPI:  Tyra Isaac is an 83 year old female with a past medical history of ESRD (MWF), Afib, HTN, anemia, and CAD who presented with worsening fatigue, myalgias, constipation, and cough. She was supposed to go to HD today, but instead she presented to the ED. She was discovered to have COVID-19 on workup with no evidence of pneumonia on CXR. Nephrology was consulted from the ED HD. Hospital Medicine was consulted for admission.    Overview/Hospital Course:  83M with PMH ESRD (MWF), Afib, HTN, and CAD who presented with acute COVID-19 forcing her to miss HD. She had no evidence of pneumonia or respiratory failure. While undergoing HD 12/4, she developed a narrow QRS complex tachycardia thought to be either SVT of Afib with RVR which resolved after adenosine and administration of a diltiazem infusion. She underwent routine HD. Cardiology was consulted and started the patient on PO sotalol as she was able to be weaned from diltiazem infusion. On 12/7, the patient developed Afib with RVR with hypotension which resolved with IV fluids administration, a dose of IV digoxin, and a bolus of IV amiodarone. The converted to NSR and her BP was at the lower limit of normal. Cardiology started PO diltiazem in addition to the PO sotalol and her vitals were monitored. She became hypotensive and bradycardic requiring addition of midodrine and IVF bolus with discontinuation of PO diltiazem and sotalol. Cardiology re-consulted and recommended to continue current regimen and f/u outpatient w/ EP referral. PT/OT recommended moderate intensity therapy. Pending SNF placement delayed due to COVID  isolation.    Interval History: Patient seen and examined. NAEON. Stable no changes.      Objective:     Vital Signs (Most Recent):  Temp: 98.2 °F (36.8 °C) (12/13/23 0845)  Pulse: (!) 55 (12/13/23 1130)  Resp: (!) 21 (12/13/23 1120)  BP: (!) 111/56 (12/13/23 1130)  SpO2: 99 % (12/13/23 1120) Vital Signs (24h Range):  Temp:  [98.1 °F (36.7 °C)-99.1 °F (37.3 °C)] 98.2 °F (36.8 °C)  Pulse:  [52-64] 55  Resp:  [20-35] 21  SpO2:  [95 %-100 %] 99 %  BP: ()/(39-94) 111/56     Weight: 50.6 kg (111 lb 8.8 oz)  Body mass index is 18.01 kg/m².    Intake/Output Summary (Last 24 hours) at 12/13/2023 1307  Last data filed at 12/13/2023 0840  Gross per 24 hour   Intake 420 ml   Output 0 ml   Net 420 ml         Physical Exam  Vitals and nursing note reviewed.   Constitutional:       General: She is not in acute distress.  HENT:      Head: Normocephalic and atraumatic.   Cardiovascular:      Rate and Rhythm: Regular rhythm. Bradycardia present.      Pulses: Normal pulses.      Heart sounds: Normal heart sounds.      Comments: RUE AVF with thrill.  Pulmonary:      Effort: Pulmonary effort is normal. No respiratory distress.      Breath sounds: Normal breath sounds.      Comments: RA.  Musculoskeletal:         General: Normal range of motion.      Cervical back: Neck supple.   Skin:     General: Skin is warm and dry.   Neurological:      General: No focal deficit present.      Mental Status: She is alert and oriented to person, place, and time. Mental status is at baseline.   Psychiatric:         Mood and Affect: Mood normal.         Behavior: Behavior normal.             Significant Labs: All pertinent labs within the past 24 hours have been reviewed.    Significant Imaging: I have reviewed all pertinent imaging results/findings within the past 24 hours.    Assessment/Plan:      * Atrial fibrillation  RVR resolved now sinus zane  Off rate and rhythm control meds due to bradycardia and hypotension  -continue eliquis  -monitor  vitals  -Telemetry  -Cardiology re-consulted; appreciate recs to continue current care and f/u outpatient w/ EP referral    COVID-19  No evidence of pneumonia and respiratory failure.  -COVID-19 precautions    CAD s/p PCI  -Continue home statin, eliquis, BP control  -Telemetry    Anemia of chronic disease  Stable.  -Trend Hgb with CBC    Debility  - PT/OT; rec moderate intensity therapy  - CM consult for SNF; delayed due to COVID (needs 10 days since positive test).    Benign hypertension with ESRD (end-stage renal disease)  BP stable  -Continue new midodrine  -Continue to monitor  -HD per Nephrology  -Renally dose medications      VTE Risk Mitigation (From admission, onward)           Ordered     apixaban tablet 2.5 mg  2 times daily         12/04/23 1145     IP VTE HIGH RISK PATIENT  Once         12/04/23 1145     Place sequential compression device  Until discontinued         12/04/23 1145     Reason for No Pharmacological VTE Prophylaxis  Once        Question:  Reasons:  Answer:  Already adequately anticoagulated on oral Anticoagulants    12/04/23 1145     heparin (porcine) injection 5,000 Units  As needed (PRN)         12/04/23 1145                    Discharge Planning   ILAN: 12/14/2023     Code Status: Full Code   Is the patient medically ready for discharge?:     Reason for patient still in hospital (select all that apply): Other (specify) discharge to SNF tomorrow after out of COVID isolation  Discharge Plan A: Skilled Nursing Facility                  Elieser Herrera MD  Department of Hospital Medicine   [facility]

## 2023-12-13 NOTE — PROGRESS NOTES
12/13/23 1215   Vital Signs   Temp 97.9 °F (36.6 °C)   Pulse 61   Resp (!) 30   SpO2 100 %   BP (!) 116/59   MAP (mmHg) 80   Post-Hemodialysis Assessment   Rinseback Volume (mL) 250 mL   Blood Volume Processed (Liters) 68.1 L   Dialyzer Clearance Lightly streaked   Duration of Treatment 180 minutes   Additional Fluid Intake (mL) 350 mL   Total UF (mL) 2100 mL   Net Fluid Removal 1500   Patient Response to Treatment tolerated well   Post-Treatment Weight 49.2 kg (108 lb 7.5 oz)   Treatment Weight Change -1.8   Arterial bleeding stop time (min) 8 min   Venous bleeding stop time (min) 8 min   Post-Hemodialysis Comments no problems post tx

## 2023-12-13 NOTE — RESPIRATORY THERAPY
12/12/23 1915   Patient Assessment/Suction   Level of Consciousness (AVPU) alert   Respiratory Effort Normal;Unlabored   PRE-TX-O2   Device (Oxygen Therapy) room air   SpO2 100 %   Pulse Oximetry Type Continuous   $ Pulse Oximetry - Multiple Charge Pulse Oximetry - Multiple   Pulse (!) 58   Resp (!) 32   Education   $ Education 15 min   Respiratory Evaluation   $ Care Plan Tech Time 15 min   $ Eval/Re-eval Charges Re-evaluation

## 2023-12-13 NOTE — PROGRESS NOTES
INPATIENT NEPHROLOGY Progress  [facility] NEPHROLOGY    Tyra Isaac  12/13/2023    Reason for consultation:  ESRD    Chief Complaint:   Chief Complaint   Patient presents with    Vomiting    Cough    Fatigue     Started with weakness 2 days ago. Dialysis mwf, started vomiting this am.       History of Present Illness:    Per ER  83-year-old female with history of end-stage renal disease with dialysis on Monday Wednesday Friday followed by Dr. Maynard with right upper extremity AV fistula, last dialysis on Friday, atrial fibrillation on Eliquis, hypertension, hyperlipidemia, anxiety, gout, CVA, COPD.  Patient with recent diagnosis of pneumonia 2 weeks ago, completed antibiotics.  She presents emergency department with complaint of persistent cough, shortness of breath, generalized weakness, nausea, vomiting over last 2 days.  Patient states that she has generalized pain all over.  Was to go to dialysis today at 6:00 a.m. however came to the emergency department due to generalized weakness, cough, fatigue.  Her son who is her primary caretaker states that he feels his mother has too many medications to take this is also causing her to feel bad.     12/5  dialysis stopped yesterday due to SVT.  Back in SR.   Mild sob.  Generalized achiness.  No angina.    12/6  underwent dialysis with 1500 cc uf.  No arrhythmias during treatment  12/7 went back into SVT vs AFIB rvr this am.  Cardiology at the bedside.  No cp.  Mild light headedness    12/8  Back in sinus rhythm.  AFVSS.  S/p hd with 2 liter ultrafiltration  12/9  Phos 2.2, discontinued binder for now.  Will trend.  Pt generally feels bad, appetite not very good.  Encouraged PO intake.  12/10  pressures low, but stable.  PO4 1.9, got PO repletion this am. Sleeping, NAD noted.   12/11 VSS. HD today,  12/12 VSS. HD tomorrow. Add phos binders when Phos > 4-5.  12/13 VSS. HD today.    Plan of Care:     ESRD on HD MWF  --continue dialysis per routine  --fluid  restrict  --renal dose medication per routine    Anemia  --erythropoiesis stimulating agent with renal replacement therapy    Hyperphosphatemia--now hypophosphatemic, likely 2/2 poor PO intake.  Got repletion 12/10  --renal diet  --hold binders  --add phos binders when Phos > 4-5.    Hypertension  Hypotension  --BP better now  --hold parameters with ultrafiltration    COVID  --management per primary team    Thank you for allowing us to participate in this patient's care. We will continue to follow.    Vital Signs:  Temp Readings from Last 3 Encounters:   12/13/23 98.2 °F (36.8 °C)   11/14/23 98.2 °F (36.8 °C) (Oral)   07/22/23 97.9 °F (36.6 °C) (Oral)       Pulse Readings from Last 3 Encounters:   12/13/23 (!) 58   11/29/23 (!) 50   11/14/23 68       BP Readings from Last 3 Encounters:   12/13/23 (!) 126/58   11/29/23 (!) 130/54   11/14/23 121/60       Weight:  Wt Readings from Last 3 Encounters:   12/04/23 50.6 kg (111 lb 8.8 oz)   11/29/23 48.5 kg (107 lb)   11/09/23 52.2 kg (115 lb 1.3 oz)       Past Medical & Surgical History:  Past Medical History:   Diagnosis Date    A-fib     Anxiety     Depression     Disorder of kidney and ureter     Encephalopathy acute 1/1/2018    End stage kidney disease 6/17/2017    Gout     Hyperlipidemia     Hypertension     Moderate episode of recurrent major depressive disorder 1/17/2018    Nephropathy hypertensive, 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    Osteopenia of multiple sites 3/9/2018    Based upon bone density measurements. Patient also has chronic kidney disease.    Stroke 11/2016       Past Surgical History:   Procedure Laterality Date    ANGIOGRAM, CORONARY, WITH LEFT HEART CATHETERIZATION N/A 2/7/2022    Procedure: Angiogram, Coronary, with Left Heart Cath;  Surgeon: Gino Leal MD;  Location: [facility] CATH/EP LAB;  Service: Cardiology;   Laterality: N/A;    CARDIAC SURGERY      stents    EYE SURGERY      WRIST SURGERY         Past Social History:  Social History     Socioeconomic History    Marital status:      Spouse name: Aria Isaac    Number of children: 3   Occupational History    Occupation: Not working   Tobacco Use    Smoking status: Never    Smokeless tobacco: Never   Substance and Sexual Activity    Alcohol use: No    Drug use: No    Sexual activity: Not Currently     Social Determinants of Health     Financial Resource Strain: Medium Risk (6/15/2022)    Overall Financial Resource Strain (CARDIA)     Difficulty of Paying Living Expenses: Somewhat hard   Food Insecurity: No Food Insecurity (6/15/2022)    Hunger Vital Sign     Worried About Running Out of Food in the Last Year: Never true     Ran Out of Food in the Last Year: Never true   Transportation Needs: Unmet Transportation Needs (3/29/2023)    PRAPARE - Transportation     Lack of Transportation (Medical): Yes     Lack of Transportation (Non-Medical): No   Physical Activity: Inactive (6/15/2022)    Exercise Vital Sign     Days of Exercise per Week: 0 days     Minutes of Exercise per Session: 0 min   Stress: Stress Concern Present (6/15/2022)    [employer] [employer] - Occupational Stress Questionnaire     Feeling of Stress : To some extent   Social Connections: Moderately Isolated (6/15/2022)    Social Connection and Isolation Panel [NHANES]     Frequency of Communication with Friends and Family: Three times a week     Frequency of Social Gatherings with Friends and Family: Three times a week     Attends Denominational Services: 1 to 4 times per year     Active Member of Clubs or Organizations: No     Attends Club or Organization Meetings: Never     Marital Status:    Housing Stability: Low Risk  (6/15/2022)    Housing Stability Vital Sign     Unable to Pay for Housing in the Last Year: No     Number of Places Lived in the Last Year: 1     Unstable  "Housing in the Last Year: No       Medications:  No current facility-administered medications on file prior to encounter.     Current Outpatient Medications on File Prior to Encounter   Medication Sig Dispense Refill    atorvastatin (LIPITOR) 40 MG tablet Take 40 mg by mouth once daily.      b complex vitamins tablet Take 1 tablet by mouth once daily.      calcium acetate,phosphat bind, (PHOSLO) 667 mg tablet Take 667 mg by mouth 2 (two) times daily with meals.      diltiaZEM (CARDIZEM CD) 120 MG Cp24 Take 1 capsule (120 mg total) by mouth 2 (two) times a day.      dorzolamide-timolol 2-0.5% (COSOPT) 22.3-6.8 mg/mL ophthalmic solution Place 1 drop into both eyes 2 (two) times daily.      ELIQUIS 2.5 mg Tab Take 2.5 mg by mouth 2 (two) times daily.      latanoprost 0.005 % ophthalmic solution Place 1 drop into both eyes every evening.      loperamide (IMODIUM A-D) 2 mg Tab Take 2 mg by mouth 4 (four) times daily as needed.      ondansetron (ZOFRAN) 4 MG tablet Take 1 tablet (4 mg total) by mouth every 8 (eight) hours as needed for Nausea. 15 tablet 0    sotaloL (BETAPACE) 80 MG tablet Take 1 tablet (80 mg total) by mouth 2 (two) times daily. 60 tablet 0     Scheduled Meds:  Continuous Infusions:  PRN Meds:.    Allergies:  Cyclobenzaprine, Fish containing products, Peanut, and Tramadol    Past Family History:  Reviewed; refer to Hospitalist Admission Note    Review of Systems:  Review of Systems - All 14 systems reviewed and negative, except as noted in HPI    Physical Exam:    BP (!) 126/58   Pulse (!) 58   Temp 98.2 °F (36.8 °C)   Resp (!) 30   Ht 5' 6" (1.676 m)   Wt 50.6 kg (111 lb 8.8 oz)   SpO2 100%   Breastfeeding No   BMI 18.01 kg/m²     General Appearance:    Ill appearing.     Head:    Normocephalic, without obvious abnormality, atraumatic   Eyes:    PER, conjunctiva/corneas clear, EOM's intact in both eyes        Throat:   Lips, mucosa, and tongue normal; teeth and gums normal   Back:     Symmetric, " "no curvature, ROM normal, no CVA tenderness   Lungs:     Clear to auscultation bilaterally, respirations unlabored   Chest wall:    No tenderness or deformity   Heart:    Regular rate and rhythm, S1 and S2 normal, no murmur, rub   or gallop   Abdomen:     Soft, non-tender, bowel sounds active all four quadrants,     no masses, no organomegaly   Extremities:   Diminished breath sounds   Pulses:   2+ and symmetric all extremities   MSK:   No joint or muscle swelling, tenderness or deformity   Skin:   Skin color, texture, turgor normal, no rashes or lesions   Neurologic:   CNII-XII intact, normal strength and sensation       Throughout.  No flap     Results:  Recent Labs   Lab 12/11/23 0405 12/12/23 0336 12/13/23  0432   * 134* 131*   K 4.7 4.6 5.0    103 103   CO2 28 23 22*   BUN 44* 24* 33*   CREATININE 8.4* 6.1* 7.9*   GLU 94 118* 89         Recent Labs   Lab 12/11/23 0405 12/12/23 0336 12/13/23  0432   CALCIUM 8.2* 9.0 9.0   ALBUMIN 2.9* 3.1* 3.1*   PHOS 2.4* 2.7 3.6   MG 2.1 2.1 2.3               No results for input(s): "POCTGLUCOSE" in the last 168 hours.    Recent Labs   Lab 01/26/22  1525   Hemoglobin A1C 5.3         Recent Labs   Lab 12/11/23 0405 12/12/23 0336 12/13/23  0432   WBC 8.48 8.54 7.31   HGB 8.9* 9.9* 9.6*   HCT 28.1* 32.3* 30.2*    326 309   * 102* 101*   MCHC 31.7* 30.7* 31.8*   MONO 11.9  1.0 13.2  1.1* 14.5  1.1*   EOSINOPHIL 9.6* 9.8* 10.3*         Recent Labs   Lab 12/11/23 0405 12/12/23  0336 12/13/23  0432   BILITOT 0.3 0.3 0.3   PROT 6.0 6.7 6.6   ALBUMIN 2.9* 3.1* 3.1*   ALKPHOS 51* 58 57   ALT 23 26 26   AST 28 31 29         Recent Labs   Lab 03/31/23  1046 11/09/23 2028 12/04/23  0959   Color, UA Yellow Yellow Yellow   Appearance, UA Clear Clear Clear   pH, UA 8.0 >8.0 A 8.0   Specific Gravity, UA 1.010 1.015 1.015   Protein, UA 1+ A 2+ A 2+ A   Glucose, UA Negative Trace A 1+ A   Ketones, UA Negative Negative Trace A   Urobilinogen, UA Negative " 2.0-3.0 A Negative   Bilirubin (UA) Negative Negative Negative   Occult Blood UA Negative Trace A 2+ A   Nitrite, UA Negative Negative Negative   RBC, UA 0 1 41 H   WBC, UA 0 0 2   Bacteria Negative Negative Negative   Hyaline Casts, UA 1 3 A 6 A         Recent Labs   Lab 11/10/23  1518 11/10/23  1529   POC PH 7.395 7.391   POC PCO2 36.5 37.2   POC HCO3 22.4 L 22.6 L   POC PO2 54 LL 53 LL   POC SATURATED O2 88 87   POC BE -3 L -2   Sample ARTERIAL ARTERIAL         Microbiology Results (last 7 days)       Procedure Component Value Units Date/Time    Blood culture [[id number]] Collected: 12/04/23 0713    Order Status: Completed Specimen: Blood Updated: 12/09/23 0832     Blood Culture, Routine No growth after 5 days.    Narrative:      Collection has been rescheduled by [facility] at 12/04/2023 07:14 Reason:   Done per phil  Collection has been rescheduled by [facility] at 12/04/2023 07:14 Reason:   Done per phil    Blood culture [[id number]] Collected: 12/04/23 0709    Order Status: Completed Specimen: Blood Updated: 12/09/23 0832     Blood Culture, Routine No growth after 5 days.    Narrative:      Collection has been rescheduled by [facility] at 12/04/2023 07:14 Reason:   Done per phil  Collection has been rescheduled by [facility] at 12/04/2023 07:14 Reason:   Done per phil          Patient care was time spent personally by me on the following activities:     Obtaining a history  Examination of patient.  Providing medical care at the patients bedside.  Developing a treatment plan with patient or surrogate and bedside caregivers  Ordering and reviewing laboratory studies, radiographic studies, pulse oximetry.  Ordering and performing treatments and interventions.  Evaluation of patient's response to treatment.  Discussions with consultants while on the unit and immediately available to the patient.  Re-evaluation of the patient's condition.  Documentation in the medical record.     Raleigh Yee MD  Nephrology  [place]  [facility] [place]  [phone]

## 2023-12-13 NOTE — PT/OT/SLP PROGRESS
Physical Therapy      Patient Name:  Tyra Isaac   MRN:  [id number]    Patient not seen today secondary to Dialysis. Will follow-up 12/14/2023.

## 2023-12-14 LAB
ALBUMIN SERPL BCP-MCNC: 3.2 G/DL (ref 3.5–5.2)
ANION GAP SERPL CALC-SCNC: 8 MMOL/L (ref 8–16)
BUN SERPL-MCNC: 23 MG/DL (ref 8–23)
CALCIUM SERPL-MCNC: 8.8 MG/DL (ref 8.7–10.5)
CHLORIDE SERPL-SCNC: 101 MMOL/L (ref 95–110)
CO2 SERPL-SCNC: 24 MMOL/L (ref 23–29)
CREAT SERPL-MCNC: 6.1 MG/DL (ref 0.5–1.4)
ERYTHROCYTE [DISTWIDTH] IN BLOOD BY AUTOMATED COUNT: 15.9 % (ref 11.5–14.5)
EST. GFR  (NO RACE VARIABLE): 6.4 ML/MIN/1.73 M^2
GLUCOSE SERPL-MCNC: 97 MG/DL (ref 70–110)
HCT VFR BLD AUTO: 30.6 % (ref 37–48.5)
HGB BLD-MCNC: 9.6 G/DL (ref 12–16)
MAGNESIUM SERPL-MCNC: 2.2 MG/DL (ref 1.6–2.6)
MCH RBC QN AUTO: 31.5 PG (ref 27–31)
MCHC RBC AUTO-ENTMCNC: 31.4 G/DL (ref 32–36)
MCV RBC AUTO: 100 FL (ref 82–98)
PHOSPHATE SERPL-MCNC: 2.9 MG/DL (ref 2.7–4.5)
PLATELET # BLD AUTO: 312 K/UL (ref 150–450)
PMV BLD AUTO: 9.4 FL (ref 9.2–12.9)
POTASSIUM SERPL-SCNC: 4.7 MMOL/L (ref 3.5–5.1)
RBC # BLD AUTO: 3.05 M/UL (ref 4–5.4)
SODIUM SERPL-SCNC: 133 MMOL/L (ref 136–145)
WBC # BLD AUTO: 6.82 K/UL (ref 3.9–12.7)

## 2023-12-14 PROCEDURE — 25000003 PHARM REV CODE 250: Performed by: STUDENT IN AN ORGANIZED HEALTH CARE EDUCATION/TRAINING PROGRAM

## 2023-12-14 PROCEDURE — 83735 ASSAY OF MAGNESIUM: CPT | Performed by: STUDENT IN AN ORGANIZED HEALTH CARE EDUCATION/TRAINING PROGRAM

## 2023-12-14 PROCEDURE — 86580 TB INTRADERMAL TEST: CPT | Performed by: HOSPITALIST

## 2023-12-14 PROCEDURE — 97530 THERAPEUTIC ACTIVITIES: CPT

## 2023-12-14 PROCEDURE — 99900031 HC PATIENT EDUCATION (STAT)

## 2023-12-14 PROCEDURE — 94761 N-INVAS EAR/PLS OXIMETRY MLT: CPT

## 2023-12-14 PROCEDURE — 97535 SELF CARE MNGMENT TRAINING: CPT

## 2023-12-14 PROCEDURE — 99900035 HC TECH TIME PER 15 MIN (STAT)

## 2023-12-14 PROCEDURE — 30200315 PPD INTRADERMAL TEST REV CODE 302: Performed by: HOSPITALIST

## 2023-12-14 PROCEDURE — 21400001 HC TELEMETRY ROOM

## 2023-12-14 PROCEDURE — 80069 RENAL FUNCTION PANEL: CPT | Performed by: STUDENT IN AN ORGANIZED HEALTH CARE EDUCATION/TRAINING PROGRAM

## 2023-12-14 PROCEDURE — 94799 UNLISTED PULMONARY SVC/PX: CPT

## 2023-12-14 PROCEDURE — 36415 COLL VENOUS BLD VENIPUNCTURE: CPT | Performed by: STUDENT IN AN ORGANIZED HEALTH CARE EDUCATION/TRAINING PROGRAM

## 2023-12-14 PROCEDURE — 97116 GAIT TRAINING THERAPY: CPT

## 2023-12-14 PROCEDURE — 85027 COMPLETE CBC AUTOMATED: CPT | Performed by: STUDENT IN AN ORGANIZED HEALTH CARE EDUCATION/TRAINING PROGRAM

## 2023-12-14 RX ADMIN — TUBERCULIN PURIFIED PROTEIN DERIVATIVE 5 UNITS: 5 INJECTION, SOLUTION INTRADERMAL at 03:12

## 2023-12-14 RX ADMIN — MIDODRINE HYDROCHLORIDE 5 MG: 2.5 TABLET ORAL at 03:12

## 2023-12-14 RX ADMIN — ATORVASTATIN CALCIUM 40 MG: 40 TABLET, FILM COATED ORAL at 08:12

## 2023-12-14 RX ADMIN — APIXABAN 2.5 MG: 2.5 TABLET, FILM COATED ORAL at 08:12

## 2023-12-14 RX ADMIN — DORZOLAMIDE HYDROCHLORIDE AND TIMOLOL MALEATE 1 DROP: 20; 5 SOLUTION/ DROPS OPHTHALMIC at 08:12

## 2023-12-14 RX ADMIN — MIDODRINE HYDROCHLORIDE 5 MG: 2.5 TABLET ORAL at 08:12

## 2023-12-14 RX ADMIN — SENNOSIDES AND DOCUSATE SODIUM 1 TABLET: 8.6; 5 TABLET ORAL at 08:12

## 2023-12-14 RX ADMIN — LATANOPROST 1 DROP: 50 SOLUTION OPHTHALMIC at 08:12

## 2023-12-14 NOTE — PT/OT/SLP PROGRESS
Physical Therapy Treatment    Patient Name:  Tyra Isaac   MRN:  [id number]    Recommendations:     Discharge Recommendations: Moderate Intensity Therapy  Discharge Equipment Recommendations: to be determined by next level of care  Barriers to discharge:  medical status    Assessment:     Tyra Isaac is a 83 y.o. female admitted with a medical diagnosis of Atrial fibrillation.  She presents with the following impairments/functional limitations: weakness, impaired endurance, impaired functional mobility, gait instability, decreased safety awareness, impaired cardiopulmonary response to activity .    Rehab Prognosis: Good; patient would benefit from acute skilled PT services to address these deficits and reach maximum level of function.    Recent Surgery: * No surgery found *      Plan:     During this hospitalization, patient to be seen 6 x/week to address the identified rehab impairments via gait training, therapeutic activities, therapeutic exercises and progress toward the following goals:    Plan of Care Expires:  01/08/24    Subjective     Chief Complaint: weakness  Patient/Family Comments/goals: Return to PLOF  Pain/Comfort:         Objective:     Communicated with nurse prior to session.  Patient found up in chair with telemetry, peripheral IV upon PT entry to room.     General Precautions: Standard, fall, droplet, contact, airborne  Orthopedic Precautions: N/A  Braces: N/A  Respiratory Status: Room air     Functional Mobility:  Transfers:     Sit to Stand:  contact guard assistance with rolling walker  Gait: 20 ft x2 RW and CGA.  Required a rest during gait due to mild shortness of breath.  Pt received cueing for decreased conversation during gait      AM-PAC 6 CLICK MOBILITY          Treatment & Education:  Mobility training as indicated above. Educated on use of call light    Patient left up in chair with all lines intact and call button in reach..    GOALS:   Multidisciplinary Problems        Physical Therapy Goals          Problem: Physical Therapy    Goal Priority Disciplines Outcome Goal Variances Interventions   Physical Therapy Goal     PT, PT/OT Ongoing, Progressing     Description: Goals to be met by: 24     Patient will increase functional independence with mobility by performin. Supine to sit with Supervision  2. Sit to stand transfer with Supervision  3. Bed to chair transfer with Supervision using Rolling Walker  4. Gait  x 150 feet with Supervision using Rolling Walker.                              Time Tracking:     PT Received On: 23  PT Start Time: 1131     PT Stop Time: 1154  PT Total Time (min): 23 min     Billable Minutes: Gait Training 12 minutes  and Therapeutic Activity 11 minutes    Treatment Type: Treatment  PT/PTA: PT     Number of PTA visits since last PT visit: 1     2023

## 2023-12-14 NOTE — PROGRESS NOTES
INPATIENT NEPHROLOGY Progress  [facility] NEPHROLOGY    Tyra Isaac  12/14/2023    Reason for consultation:  ESRD    Chief Complaint:   Chief Complaint   Patient presents with    Vomiting    Cough    Fatigue     Started with weakness 2 days ago. Dialysis mwf, started vomiting this am.       History of Present Illness:    Per ER  83-year-old female with history of end-stage renal disease with dialysis on Monday Wednesday Friday followed by Dr. Maynard with right upper extremity AV fistula, last dialysis on Friday, atrial fibrillation on Eliquis, hypertension, hyperlipidemia, anxiety, gout, CVA, COPD.  Patient with recent diagnosis of pneumonia 2 weeks ago, completed antibiotics.  She presents emergency department with complaint of persistent cough, shortness of breath, generalized weakness, nausea, vomiting over last 2 days.  Patient states that she has generalized pain all over.  Was to go to dialysis today at 6:00 a.m. however came to the emergency department due to generalized weakness, cough, fatigue.  Her son who is her primary caretaker states that he feels his mother has too many medications to take this is also causing her to feel bad.     12/5  dialysis stopped yesterday due to SVT.  Back in SR.   Mild sob.  Generalized achiness.  No angina.    12/6  underwent dialysis with 1500 cc uf.  No arrhythmias during treatment  12/7 went back into SVT vs AFIB rvr this am.  Cardiology at the bedside.  No cp.  Mild light headedness    12/8  Back in sinus rhythm.  AFVSS.  S/p hd with 2 liter ultrafiltration  12/9  Phos 2.2, discontinued binder for now.  Will trend.  Pt generally feels bad, appetite not very good.  Encouraged PO intake.  12/10  pressures low, but stable.  PO4 1.9, got PO repletion this am. Sleeping, NAD noted.   12/11 VSS. HD today,  12/12 VSS. HD tomorrow. Add phos binders when Phos > 4-5.  12/13 VSS. HD today.  12/14 VSS. Tolerated HD well yesterday. Next HD tomorrow.    Plan of Care:     ESRD on  HD MWF  --continue dialysis per routine  --fluid restrict  --renal dose medication per routine    Anemia  --erythropoiesis stimulating agent with renal replacement therapy    Hyperphosphatemia--now hypophosphatemic, likely 2/2 poor PO intake.  Got repletion 12/10  --renal diet  --hold binders  --add phos binders when Phos > 4-5.    Hypertension  Hypotension  --BP better now  --hold parameters with ultrafiltration    COVID  --management per primary team    Thank you for allowing us to participate in this patient's care. We will continue to follow.    Vital Signs:  Temp Readings from Last 3 Encounters:   12/14/23 98 °F (36.7 °C)   11/14/23 98.2 °F (36.8 °C) (Oral)   07/22/23 97.9 °F (36.6 °C) (Oral)       Pulse Readings from Last 3 Encounters:   12/14/23 65   11/29/23 (!) 50   11/14/23 68       BP Readings from Last 3 Encounters:   12/14/23 109/76   11/29/23 (!) 130/54   11/14/23 121/60       Weight:  Wt Readings from Last 3 Encounters:   12/04/23 50.6 kg (111 lb 8.8 oz)   11/29/23 48.5 kg (107 lb)   11/09/23 52.2 kg (115 lb 1.3 oz)       Past Medical & Surgical History:  Past Medical History:   Diagnosis Date    A-fib     Anxiety     Depression     Disorder of kidney and ureter     Encephalopathy acute 1/1/2018    End stage kidney disease 6/17/2017    Gout     Hyperlipidemia     Hypertension     Moderate episode of recurrent major depressive disorder 1/17/2018    Nephropathy hypertensive, 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    Osteopenia of multiple sites 3/9/2018    Based upon bone density measurements. Patient also has chronic kidney disease.    Stroke 11/2016       Past Surgical History:   Procedure Laterality Date    ANGIOGRAM, CORONARY, WITH LEFT HEART CATHETERIZATION N/A 2/7/2022    Procedure: Angiogram, Coronary, with Left Heart Cath;  Surgeon: Gino Leal MD;  Location:  [facility] CATH/EP LAB;  Service: Cardiology;  Laterality: N/A;    CARDIAC SURGERY      stents    EYE SURGERY      WRIST SURGERY         Past Social History:  Social History     Socioeconomic History    Marital status:      Spouse name: Aria Isaac    Number of children: 3   Occupational History    Occupation: Not working   Tobacco Use    Smoking status: Never    Smokeless tobacco: Never   Substance and Sexual Activity    Alcohol use: No    Drug use: No    Sexual activity: Not Currently     Social Determinants of Health     Financial Resource Strain: Medium Risk (6/15/2022)    Overall Financial Resource Strain (CARDIA)     Difficulty of Paying Living Expenses: Somewhat hard   Food Insecurity: No Food Insecurity (6/15/2022)    Hunger Vital Sign     Worried About Running Out of Food in the Last Year: Never true     Ran Out of Food in the Last Year: Never true   Transportation Needs: Unmet Transportation Needs (3/29/2023)    PRAPARE - Transportation     Lack of Transportation (Medical): Yes     Lack of Transportation (Non-Medical): No   Physical Activity: Inactive (6/15/2022)    Exercise Vital Sign     Days of Exercise per Week: 0 days     Minutes of Exercise per Session: 0 min   Stress: Stress Concern Present (6/15/2022)    [employer] [place] [employer] - Occupational Stress Questionnaire     Feeling of Stress : To some extent   Social Connections: Moderately Isolated (6/15/2022)    Social Connection and Isolation Panel [NHANES]     Frequency of Communication with Friends and Family: Three times a week     Frequency of Social Gatherings with Friends and Family: Three times a week     Attends Taoism Services: 1 to 4 times per year     Active Member of Clubs or Organizations: No     Attends Club or Organization Meetings: Never     Marital Status:    Housing Stability: Low Risk  (6/15/2022)    Housing Stability Vital Sign     Unable to Pay for Housing in the Last Year: No     Number of  "Places Lived in the Last Year: 1     Unstable Housing in the Last Year: No       Medications:  No current facility-administered medications on file prior to encounter.     Current Outpatient Medications on File Prior to Encounter   Medication Sig Dispense Refill    atorvastatin (LIPITOR) 40 MG tablet Take 40 mg by mouth once daily.      b complex vitamins tablet Take 1 tablet by mouth once daily.      calcium acetate,phosphat bind, (PHOSLO) 667 mg tablet Take 667 mg by mouth 2 (two) times daily with meals.      diltiaZEM (CARDIZEM CD) 120 MG Cp24 Take 1 capsule (120 mg total) by mouth 2 (two) times a day.      dorzolamide-timolol 2-0.5% (COSOPT) 22.3-6.8 mg/mL ophthalmic solution Place 1 drop into both eyes 2 (two) times daily.      ELIQUIS 2.5 mg Tab Take 2.5 mg by mouth 2 (two) times daily.      latanoprost 0.005 % ophthalmic solution Place 1 drop into both eyes every evening.      loperamide (IMODIUM A-D) 2 mg Tab Take 2 mg by mouth 4 (four) times daily as needed.      ondansetron (ZOFRAN) 4 MG tablet Take 1 tablet (4 mg total) by mouth every 8 (eight) hours as needed for Nausea. 15 tablet 0    sotaloL (BETAPACE) 80 MG tablet Take 1 tablet (80 mg total) by mouth 2 (two) times daily. 60 tablet 0     Scheduled Meds:  Continuous Infusions:  PRN Meds:.    Allergies:  Cyclobenzaprine, Fish containing products, Peanut, and Tramadol    Past Family History:  Reviewed; refer to Hospitalist Admission Note    Review of Systems:  Review of Systems - All 14 systems reviewed and negative, except as noted in HPI    Physical Exam:    /76   Pulse 65   Temp 98 °F (36.7 °C)   Resp 20   Ht 5' 6" (1.676 m)   Wt 50.6 kg (111 lb 8.8 oz)   SpO2 99%   Breastfeeding No   BMI 18.01 kg/m²     General Appearance:    Ill appearing.     Head:    Normocephalic, without obvious abnormality, atraumatic   Eyes:    PER, conjunctiva/corneas clear, EOM's intact in both eyes        Throat:   Lips, mucosa, and tongue normal; teeth and " "gums normal   Back:     Symmetric, no curvature, ROM normal, no CVA tenderness   Lungs:     Clear to auscultation bilaterally, respirations unlabored   Chest wall:    No tenderness or deformity   Heart:    Regular rate and rhythm, S1 and S2 normal, no murmur, rub   or gallop   Abdomen:     Soft, non-tender, bowel sounds active all four quadrants,     no masses, no organomegaly   Extremities:   Diminished breath sounds   Pulses:   2+ and symmetric all extremities   MSK:   No joint or muscle swelling, tenderness or deformity   Skin:   Skin color, texture, turgor normal, no rashes or lesions   Neurologic:   CNII-XII intact, normal strength and sensation       Throughout.  No flap     Results:  Recent Labs   Lab 12/12/23 0336 12/13/23  0432 12/14/23  0435   * 131* 133*   K 4.6 5.0 4.7    103 101   CO2 23 22* 24   BUN 24* 33* 23   CREATININE 6.1* 7.9* 6.1*   * 89 97         Recent Labs   Lab 12/12/23 0336 12/13/23 0432 12/14/23  0435   CALCIUM 9.0 9.0 8.8   ALBUMIN 3.1* 3.1* 3.2*   PHOS 2.7 3.6 2.9   MG 2.1 2.3 2.2               No results for input(s): "POCTGLUCOSE" in the last 168 hours.    Recent Labs   Lab 01/26/22  1525   Hemoglobin A1C 5.3         Recent Labs   Lab 12/11/23  0405 12/12/23 0336 12/13/23 0432 12/14/23  0435   WBC 8.48 8.54 7.31 6.82   HGB 8.9* 9.9* 9.6* 9.6*   HCT 28.1* 32.3* 30.2* 30.6*    326 309 312   * 102* 101* 100*   MCHC 31.7* 30.7* 31.8* 31.4*   MONO 11.9  1.0 13.2  1.1* 14.5  1.1*  --    EOSINOPHIL 9.6* 9.8* 10.3*  --          Recent Labs   Lab 12/11/23  0405 12/12/23  0336 12/13/23  0432 12/14/23  0435   BILITOT 0.3 0.3 0.3  --    PROT 6.0 6.7 6.6  --    ALBUMIN 2.9* 3.1* 3.1* 3.2*   ALKPHOS 51* 58 57  --    ALT 23 26 26  --    AST 28 31 29  --          Recent Labs   Lab 03/31/23  1046 11/09/23 2028 12/04/23  0959   Color, UA Yellow Yellow Yellow   Appearance, UA Clear Clear Clear   pH, UA 8.0 >8.0 A 8.0   Specific Gravity, UA 1.010 1.015 1.015 "   Protein, UA 1+ A 2+ A 2+ A   Glucose, UA Negative Trace A 1+ A   Ketones, UA Negative Negative Trace A   Urobilinogen, UA Negative 2.0-3.0 A Negative   Bilirubin (UA) Negative Negative Negative   Occult Blood UA Negative Trace A 2+ A   Nitrite, UA Negative Negative Negative   RBC, UA 0 1 41 H   WBC, UA 0 0 2   Bacteria Negative Negative Negative   Hyaline Casts, UA 1 3 A 6 A         Recent Labs   Lab 11/10/23  1518 11/10/23  1529   POC PH 7.395 7.391   POC PCO2 36.5 37.2   POC HCO3 22.4 L 22.6 L   POC PO2 54 LL 53 LL   POC SATURATED O2 88 87   POC BE -3 L -2   Sample ARTERIAL ARTERIAL         Microbiology Results (last 7 days)       Procedure Component Value Units Date/Time    Blood culture [[id number]] Collected: 12/04/23 0713    Order Status: Completed Specimen: Blood Updated: 12/09/23 0832     Blood Culture, Routine No growth after 5 days.    Narrative:      Collection has been rescheduled by [facility] at 12/04/2023 07:14 Reason:   Done per phil  Collection has been rescheduled by [facility] at 12/04/2023 07:14 Reason:   Done per phil    Blood culture [[id number]] Collected: 12/04/23 0709    Order Status: Completed Specimen: Blood Updated: 12/09/23 0832     Blood Culture, Routine No growth after 5 days.    Narrative:      Collection has been rescheduled by [facility] at 12/04/2023 07:14 Reason:   Done per phil  Collection has been rescheduled by [facility] at 12/04/2023 07:14 Reason:   Done per phil          Patient care was time spent personally by me on the following activities:     Obtaining a history  Examination of patient.  Providing medical care at the patients bedside.  Developing a treatment plan with patient or surrogate and bedside caregivers  Ordering and reviewing laboratory studies, radiographic studies, pulse oximetry.  Ordering and performing treatments and interventions.  Evaluation of patient's response to treatment.  Discussions with consultants while on the unit and immediately available to the  patient.  Re-evaluation of the patient's condition.  Documentation in the medical record.     Raleigh Yee MD  Nephrology  [facility] [place]  [phone]

## 2023-12-14 NOTE — PT/OT/SLP PROGRESS
Occupational Therapy   Treatment    Name: Tyra Isaac  MRN: [id number]  Admitting Diagnosis:  Atrial fibrillation       Recommendations:     Discharge Recommendations: Moderate Intensity Therapy  Discharge Equipment Recommendations:  bath bench  Barriers to discharge:   (hypotension, falls risk)    Assessment:     Tyra Isaac is a 83 y.o. female with a medical diagnosis of Atrial fibrillation.  She presents with improving medical acuity and functional mobility. Patient participated in bed mobility, bed/chair transfer and grooming sitting in chair. Performance deficits affecting function are weakness, impaired endurance, impaired self care skills, impaired functional mobility, gait instability, impaired balance, decreased safety awareness, impaired cardiopulmonary response to activity.     Rehab Prognosis:  Fair; patient would benefit from acute skilled OT services to address these deficits and reach maximum level of function.       Plan:     Patient to be seen 5 x/week to address the above listed problems via self-care/home management, therapeutic activities, therapeutic exercises  Plan of Care Expires: 01/07/24  Plan of Care Reviewed with: patient    Subjective     Chief Complaint: General weakness  Patient/Family Comments/goals: Improved functional mobility and ADL independence.  Pain/Comfort:  Pain Rating 1: 0/10  Pain Rating Post-Intervention 1: 0/10    Objective:     Communicated with: nurse prior to session.  Patient found HOB elevated with telemetry, peripheral IV upon OT entry to room.    General Precautions: Standard, fall    Orthopedic Precautions:N/A  Braces: N/A  Respiratory Status: Room air     Occupational Performance:     Bed Mobility:    Patient completed Scooting/Bridging with contact guard assistance  Patient completed Supine to Sit with contact guard assistance   Performed unsupported sitting EOB with contact guard assistance.    Functional Mobility/Transfers:  Patient completed Bed  <> Chair Transfer using Step Transfer technique with contact guard assistance with rolling walker    Activities of Daily Living:  Grooming: contact guard assistance to brush teeth and wash face sitting in chair.  Lower Body Dressing: contact guard assistance to don/doff socks sitting in chair.      [facility] 6 Click ADL: 19    Treatment & Education:  Patient is making positive progress towards OT goals.    Patient left up in chair with all lines intact and call button in reach    GOALS:   Multidisciplinary Problems       Occupational Therapy Goals          Problem: Occupational Therapy    Goal Priority Disciplines Outcome Interventions   Occupational Therapy Goal     OT, PT/OT Ongoing, Progressing    Description: Goals to be met by: 1/9/24     Patient will increase functional independence with ADLs by performing:    UE Dressing with Supervision.  LE Dressing with Supervision and Assistive Devices as needed.  Grooming while standing at sink with Supervision.  Toileting from toilet with Supervision for hygiene and clothing management.   Toilet transfer to toilet with Supervision.                         Time Tracking:     OT Date of Treatment: 12/14/23  OT Start Time: 1028  OT Stop Time: 1057  OT Total Time (min): 29 min    Billable Minutes:Self Care/Home Management 15  Therapeutic Activity 14    OT/ANSHUL: OT          12/14/2023

## 2023-12-14 NOTE — PLAN OF CARE
9:04am VICENTE spoke to Shayna with [facility] who stated they will submit for authorization today and can accept tomorrow.  VICENTE informed that today is 10th day on covid+ precautions.  Per Shayna 12/04/2023 is day ZERO and today 12/14/2023 is day 10; therefore can accept patient tomorrow.    3:10pm SW attempted to contact patient's son Raffi Isaac to inform of discharge tomorrow.  SW unable to leave voice message as voice mail not set up at this time.     VICENTE spoke to [facility] with [facility] S[facility] [phone] who stated patient comes to their clinic MWF at 6:45am            12/14/23 0904   Post-Acute Status   Post-Acute Authorization Placement   Post-Acute Placement Status Pending payor review/awaiting authorization (if required)

## 2023-12-14 NOTE — PLAN OF CARE
12/14/23 0755   Patient Assessment/Suction   Level of Consciousness (AVPU) alert   Respiratory Effort Normal;Unlabored   Expansion/Accessory Muscles/Retractions no use of accessory muscles   All Lung Fields Breath Sounds diminished   PRE-TX-O2   Device (Oxygen Therapy) room air   SpO2 100 %   Pulse Oximetry Type Continuous   $ Pulse Oximetry - Multiple Charge Pulse Oximetry - Multiple   Pulse 61   Resp (!) 41   /60   Education   $ Education 15 min  (SpO2)

## 2023-12-14 NOTE — PLAN OF CARE
Problem: Adult Inpatient Plan of Care  Goal: Plan of Care Review  Outcome: Ongoing, Progressing  Goal: Patient-Specific Goal (Individualized)  Outcome: Ongoing, Progressing  Goal: Absence of Hospital-Acquired Illness or Injury  Outcome: Ongoing, Progressing  Goal: Optimal Comfort and Wellbeing  Outcome: Ongoing, Progressing  Goal: Readiness for Transition of Care  Outcome: Ongoing, Progressing    Problem: Fall Injury Risk  Goal: Absence of Fall and Fall-Related Injury  Outcome: Ongoing, Progressing     Problem: Skin Injury Risk Increased  Goal: Skin Health and Integrity  Outcome: Ongoing, Progressing     Problem: Oral Intake Inadequate  Goal: Improved Oral Intake  Outcome: Ongoing, Progressing

## 2023-12-15 PROBLEM — N18.6 ESRD (END STAGE RENAL DISEASE): Status: ACTIVE | Noted: 2023-12-15

## 2023-12-15 PROBLEM — U07.1 COVID-19: Status: RESOLVED | Noted: 2023-12-04 | Resolved: 2023-12-15

## 2023-12-15 PROBLEM — D53.9 MACROCYTIC ANEMIA: Status: ACTIVE | Noted: 2023-12-15

## 2023-12-15 LAB
ERYTHROCYTE [DISTWIDTH] IN BLOOD BY AUTOMATED COUNT: 15.7 % (ref 11.5–14.5)
HCT VFR BLD AUTO: 33.4 % (ref 37–48.5)
HGB BLD-MCNC: 10.3 G/DL (ref 12–16)
MAGNESIUM SERPL-MCNC: 2.4 MG/DL (ref 1.6–2.6)
MCH RBC QN AUTO: 31.5 PG (ref 27–31)
MCHC RBC AUTO-ENTMCNC: 30.8 G/DL (ref 32–36)
MCV RBC AUTO: 102 FL (ref 82–98)
PLATELET # BLD AUTO: 333 K/UL (ref 150–450)
PMV BLD AUTO: 9.6 FL (ref 9.2–12.9)
RBC # BLD AUTO: 3.27 M/UL (ref 4–5.4)
WBC # BLD AUTO: 6.69 K/UL (ref 3.9–12.7)

## 2023-12-15 PROCEDURE — 25000003 PHARM REV CODE 250: Performed by: STUDENT IN AN ORGANIZED HEALTH CARE EDUCATION/TRAINING PROGRAM

## 2023-12-15 PROCEDURE — 85027 COMPLETE CBC AUTOMATED: CPT | Performed by: STUDENT IN AN ORGANIZED HEALTH CARE EDUCATION/TRAINING PROGRAM

## 2023-12-15 PROCEDURE — 63600175 PHARM REV CODE 636 W HCPCS: Mod: JZ | Performed by: STUDENT IN AN ORGANIZED HEALTH CARE EDUCATION/TRAINING PROGRAM

## 2023-12-15 PROCEDURE — 90935 HEMODIALYSIS ONE EVALUATION: CPT

## 2023-12-15 PROCEDURE — 36415 COLL VENOUS BLD VENIPUNCTURE: CPT | Performed by: STUDENT IN AN ORGANIZED HEALTH CARE EDUCATION/TRAINING PROGRAM

## 2023-12-15 PROCEDURE — 94761 N-INVAS EAR/PLS OXIMETRY MLT: CPT

## 2023-12-15 PROCEDURE — 83735 ASSAY OF MAGNESIUM: CPT | Performed by: STUDENT IN AN ORGANIZED HEALTH CARE EDUCATION/TRAINING PROGRAM

## 2023-12-15 RX ORDER — MIDODRINE HYDROCHLORIDE 5 MG/1
5 TABLET ORAL 3 TIMES DAILY
Qty: 90 TABLET | Refills: 0 | Status: ON HOLD | OUTPATIENT
Start: 2023-12-15 | End: 2023-12-21

## 2023-12-15 RX ORDER — AMOXICILLIN 250 MG
1 CAPSULE ORAL 2 TIMES DAILY PRN
Qty: 30 TABLET | Refills: 0 | Status: SHIPPED | OUTPATIENT
Start: 2023-12-15 | End: 2024-01-14

## 2023-12-15 RX ORDER — LOPERAMIDE HCL 2 MG
2 TABLET ORAL 4 TIMES DAILY PRN
Qty: 3 TABLET | Refills: 0 | Status: SHIPPED | OUTPATIENT
Start: 2023-12-15

## 2023-12-15 RX ADMIN — SENNOSIDES AND DOCUSATE SODIUM 1 TABLET: 8.6; 5 TABLET ORAL at 08:12

## 2023-12-15 RX ADMIN — DORZOLAMIDE HYDROCHLORIDE AND TIMOLOL MALEATE 1 DROP: 20; 5 SOLUTION/ DROPS OPHTHALMIC at 08:12

## 2023-12-15 RX ADMIN — APIXABAN 2.5 MG: 2.5 TABLET, FILM COATED ORAL at 08:12

## 2023-12-15 RX ADMIN — MIDODRINE HYDROCHLORIDE 5 MG: 2.5 TABLET ORAL at 08:12

## 2023-12-15 RX ADMIN — ATORVASTATIN CALCIUM 40 MG: 40 TABLET, FILM COATED ORAL at 08:12

## 2023-12-15 RX ADMIN — EPOETIN ALFA-EPBX 2400 UNITS: 10000 INJECTION, SOLUTION INTRAVENOUS; SUBCUTANEOUS at 03:12

## 2023-12-15 NOTE — RESPIRATORY THERAPY
12/14/23 1915   Patient Assessment/Suction   Level of Consciousness (AVPU) alert   Respiratory Effort Normal;Unlabored   PRE-TX-O2   Device (Oxygen Therapy) room air   SpO2 100 %   Pulse Oximetry Type Continuous   $ Pulse Oximetry - Multiple Charge Pulse Oximetry - Multiple   Pulse 62   Resp (!) 25   Education   $ Education 15 min   Respiratory Evaluation   $ Care Plan Tech Time 15 min   $ Eval/Re-eval Charges Re-evaluation

## 2023-12-15 NOTE — NURSING
Nurses Note -- 4 Eyes      12/15/2023   12:38 AM      Skin assessed during: Transfer      [x] No Altered Skin Integrity Present    [x]Prevention Measures Documented      [] Yes- Altered Skin Integrity Present or Discovered   [] LDA Added if Not in Epic (Describe Wound)   [] New Altered Skin Integrity was Present on Admit and Documented in LDA   [] Wound Image Taken    Wound Care Consulted? No    Attending Nurse:  Seymour Henao RN/Staff Member:   [id number]

## 2023-12-15 NOTE — PLAN OF CARE
CM messaged Janett at [facility] to inquire about [employer] auth and inform pt has dc orders. CM waiting for return call or message from Janett. CM will contact [facility].      0922- CM s/w Chana from [facility] is approved with start date of today. Auth # [id number]. [facility] has faxed this auth to [facility] admissions.    0934- CM sent dc orders to Janett at [facility] via Care port. CM messaged Janett to notify orders are in.    0939- CM contacted pt's son Raffi and noified pt will discharge after dialysis today.    0943- CM contacted [facility] S[facility]chez, s/w [facility] to inform pt will return to M,W,F HD on Monday 12/18. D?c summary faxed to Maddie.    1150- Per Janett at [facility], orders still under review. CM waiting for facility to give permission for nurse to call report.    1230- Pt needs HD prior to discharge. CM called HD room, no answer. CM went to HD dept but no one was available. CM left a message requesting HD dialyze early for discharge this evening. CM explained facility will not accept after 5 pm.     1250- CM received a call from [facility] nurse stating they could not take pt until 4 pm. CM explained discharge would be delayed until 12/16. HD nurse stated pt's with higher potassium take priority but HD nurse would try to take pt earlier if possible.    1515- CM received permission to call report to Nurse for room A25. Pt is in HD at this time.    1600- CM notified Nurse Parks to call report. KATY will place an order for wheelchair transport to [facility] after Dialysis is complete.

## 2023-12-15 NOTE — NURSING
Patient taken to dialysis at 1411 via bed by nursing tech.     Patient back from dialysis at 1834. Pending  to discharge to [place].

## 2023-12-15 NOTE — PROGRESS NOTES
INPATIENT NEPHROLOGY Progress  [facility] NEPHROLOGY    Tyra Isaac  12/15/2023    Reason for consultation:  ESRD    Chief Complaint:   Chief Complaint   Patient presents with    Vomiting    Cough    Fatigue     Started with weakness 2 days ago. Dialysis mwf, started vomiting this am.       History of Present Illness:    Per ER  83-year-old female with history of end-stage renal disease with dialysis on Monday Wednesday Friday followed by Dr. Maynard with right upper extremity AV fistula, last dialysis on Friday, atrial fibrillation on Eliquis, hypertension, hyperlipidemia, anxiety, gout, CVA, COPD.  Patient with recent diagnosis of pneumonia 2 weeks ago, completed antibiotics.  She presents emergency department with complaint of persistent cough, shortness of breath, generalized weakness, nausea, vomiting over last 2 days.  Patient states that she has generalized pain all over.  Was to go to dialysis today at 6:00 a.m. however came to the emergency department due to generalized weakness, cough, fatigue.  Her son who is her primary caretaker states that he feels his mother has too many medications to take this is also causing her to feel bad.     12/5  dialysis stopped yesterday due to SVT.  Back in SR.   Mild sob.  Generalized achiness.  No angina.    12/6  underwent dialysis with 1500 cc uf.  No arrhythmias during treatment  12/7 went back into SVT vs AFIB rvr this am.  Cardiology at the bedside.  No cp.  Mild light headedness    12/8  Back in sinus rhythm.  AFVSS.  S/p hd with 2 liter ultrafiltration  12/9  Phos 2.2, discontinued binder for now.  Will trend.  Pt generally feels bad, appetite not very good.  Encouraged PO intake.  12/10  pressures low, but stable.  PO4 1.9, got PO repletion this am. Sleeping, NAD noted.   12/11 VSS. HD today,  12/12 VSS. HD tomorrow. Add phos binders when Phos > 4-5.  12/13 VSS. HD today.  12/14 VSS. Tolerated HD well yesterday. Next HD tomorrow.  12/15 VSS. Seen on HD today.  OK to dc after if stable.    Plan of Care:     ESRD on HD MWF  --continue dialysis per routine  --fluid restrict  --renal dose medication per routine    Anemia  --erythropoiesis stimulating agent with renal replacement therapy    Hyperphosphatemia--now hypophosphatemic, likely 2/2 poor PO intake.  Got repletion 12/10  --renal diet  --hold binders  --add phos binders when Phos > 4-5.    Hypertension  Hypotension  --BP better now  --hold parameters with ultrafiltration    COVID  --management per primary team    Thank you for allowing us to participate in this patient's care. We will continue to follow.    Vital Signs:  Temp Readings from Last 3 Encounters:   12/15/23 98.5 °F (36.9 °C) (Oral)   11/14/23 98.2 °F (36.8 °C) (Oral)   07/22/23 97.9 °F (36.6 °C) (Oral)       Pulse Readings from Last 3 Encounters:   12/15/23 62   11/29/23 (!) 50   11/14/23 68       BP Readings from Last 3 Encounters:   12/15/23 139/62   11/29/23 (!) 130/54   11/14/23 121/60       Weight:  Wt Readings from Last 3 Encounters:   12/04/23 50.6 kg (111 lb 8.8 oz)   11/29/23 48.5 kg (107 lb)   11/09/23 52.2 kg (115 lb 1.3 oz)       Past Medical & Surgical History:  Past Medical History:   Diagnosis Date    A-fib     Anxiety     Depression     Disorder of kidney and ureter     Encephalopathy acute 1/1/2018    End stage kidney disease 6/17/2017    Gout     Hyperlipidemia     Hypertension     Moderate episode of recurrent major depressive disorder 1/17/2018    Nephropathy hypertensive, 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    Osteopenia of multiple sites 3/9/2018    Based upon bone density measurements. Patient also has chronic kidney disease.    Stroke 11/2016       Past Surgical History:   Procedure Laterality Date    ANGIOGRAM, CORONARY, WITH LEFT HEART CATHETERIZATION N/A 2/7/2022    Procedure: Angiogram, Coronary,  with Left Heart Cath;  Surgeon: Gino Leal MD;  Location: [facility] CATH/EP LAB;  Service: Cardiology;  Laterality: N/A;    CARDIAC SURGERY      stents    EYE SURGERY      WRIST SURGERY         Past Social History:  Social History     Socioeconomic History    Marital status:      Spouse name: Aria Isaac    Number of children: 3   Occupational History    Occupation: Not working   Tobacco Use    Smoking status: Never    Smokeless tobacco: Never   Substance and Sexual Activity    Alcohol use: No    Drug use: No    Sexual activity: Not Currently     Social Determinants of Health     Financial Resource Strain: Medium Risk (6/15/2022)    Overall Financial Resource Strain (CARDIA)     Difficulty of Paying Living Expenses: Somewhat hard   Food Insecurity: No Food Insecurity (6/15/2022)    Hunger Vital Sign     Worried About Running Out of Food in the Last Year: Never true     Ran Out of Food in the Last Year: Never true   Transportation Needs: Unmet Transportation Needs (3/29/2023)    PRAPARE - Transportation     Lack of Transportation (Medical): Yes     Lack of Transportation (Non-Medical): No   Physical Activity: Inactive (6/15/2022)    Exercise Vital Sign     Days of Exercise per Week: 0 days     Minutes of Exercise per Session: 0 min   Stress: Stress Concern Present (6/15/2022)    [employer] [place] [employer] - Occupational Stress Questionnaire     Feeling of Stress : To some extent   Social Connections: Moderately Isolated (6/15/2022)    Social Connection and Isolation Panel [NHANES]     Frequency of Communication with Friends and Family: Three times a week     Frequency of Social Gatherings with Friends and Family: Three times a week     Attends Nondenominational Services: 1 to 4 times per year     Active Member of Clubs or Organizations: No     Attends Club or Organization Meetings: Never     Marital Status:    Housing Stability: Low Risk  (6/15/2022)    Housing Stability Vital Sign      "Unable to Pay for Housing in the Last Year: No     Number of Places Lived in the Last Year: 1     Unstable Housing in the Last Year: No       Medications:  No current facility-administered medications on file prior to encounter.     Current Outpatient Medications on File Prior to Encounter   Medication Sig Dispense Refill    atorvastatin (LIPITOR) 40 MG tablet Take 40 mg by mouth once daily.      b complex vitamins tablet Take 1 tablet by mouth once daily.      dorzolamide-timolol 2-0.5% (COSOPT) 22.3-6.8 mg/mL ophthalmic solution Place 1 drop into both eyes 2 (two) times daily.      ELIQUIS 2.5 mg Tab Take 2.5 mg by mouth 2 (two) times daily.      latanoprost 0.005 % ophthalmic solution Place 1 drop into both eyes every evening.      ondansetron (ZOFRAN) 4 MG tablet Take 1 tablet (4 mg total) by mouth every 8 (eight) hours as needed for Nausea. 15 tablet 0    [DISCONTINUED] calcium acetate,phosphat bind, (PHOSLO) 667 mg tablet Take 667 mg by mouth 2 (two) times daily with meals.      [DISCONTINUED] diltiaZEM (CARDIZEM CD) 120 MG Cp24 Take 1 capsule (120 mg total) by mouth 2 (two) times a day.      [DISCONTINUED] loperamide (IMODIUM A-D) 2 mg Tab Take 2 mg by mouth 4 (four) times daily as needed.      [DISCONTINUED] sotaloL (BETAPACE) 80 MG tablet Take 1 tablet (80 mg total) by mouth 2 (two) times daily. 60 tablet 0     Scheduled Meds:  Continuous Infusions:  PRN Meds:.    Allergies:  Cyclobenzaprine, Fish containing products, Peanut, and Tramadol    Past Family History:  Reviewed; refer to Hospitalist Admission Note    Review of Systems:  Review of Systems - All 14 systems reviewed and negative, except as noted in HPI    Physical Exam:    /62 (BP Location: Left arm, Patient Position: Lying)   Pulse 62   Temp 98.5 °F (36.9 °C) (Oral)   Resp 16   Ht 5' 6" (1.676 m)   Wt 50.6 kg (111 lb 8.8 oz)   SpO2 97%   Breastfeeding No   BMI 18.01 kg/m²     General Appearance:    Ill appearing.     Head:    " "Normocephalic, without obvious abnormality, atraumatic   Eyes:    PER, conjunctiva/corneas clear, EOM's intact in both eyes        Throat:   Lips, mucosa, and tongue normal; teeth and gums normal   Back:     Symmetric, no curvature, ROM normal, no CVA tenderness   Lungs:     Clear to auscultation bilaterally, respirations unlabored   Chest wall:    No tenderness or deformity   Heart:    Regular rate and rhythm, S1 and S2 normal, no murmur, rub   or gallop   Abdomen:     Soft, non-tender, bowel sounds active all four quadrants,     no masses, no organomegaly   Extremities:   Diminished breath sounds   Pulses:   2+ and symmetric all extremities   MSK:   No joint or muscle swelling, tenderness or deformity   Skin:   Skin color, texture, turgor normal, no rashes or lesions   Neurologic:   CNII-XII intact, normal strength and sensation       Throughout.  No flap     Results:  Recent Labs   Lab 12/12/23  0336 12/13/23 0432 12/14/23  0435   * 131* 133*   K 4.6 5.0 4.7    103 101   CO2 23 22* 24   BUN 24* 33* 23   CREATININE 6.1* 7.9* 6.1*   * 89 97         Recent Labs   Lab 12/12/23  0336 12/13/23  0432 12/14/23  0435 12/15/23  0537   CALCIUM 9.0 9.0 8.8  --    ALBUMIN 3.1* 3.1* 3.2*  --    PHOS 2.7 3.6 2.9  --    MG 2.1 2.3 2.2 2.4               No results for input(s): "POCTGLUCOSE" in the last 168 hours.    Recent Labs   Lab 01/26/22  1525   Hemoglobin A1C 5.3         Recent Labs   Lab 12/11/23  0405 12/12/23  0336 12/13/23  0432 12/14/23  0435 12/15/23  0537   WBC 8.48 8.54 7.31 6.82 6.69   HGB 8.9* 9.9* 9.6* 9.6* 10.3*   HCT 28.1* 32.3* 30.2* 30.6* 33.4*    326 309 312 333   * 102* 101* 100* 102*   MCHC 31.7* 30.7* 31.8* 31.4* 30.8*   MONO 11.9  1.0 13.2  1.1* 14.5  1.1*  --   --    EOSINOPHIL 9.6* 9.8* 10.3*  --   --          Recent Labs   Lab 12/11/23  0405 12/12/23  0336 12/13/23  0432 12/14/23  0435   BILITOT 0.3 0.3 0.3  --    PROT 6.0 6.7 6.6  --    ALBUMIN 2.9* 3.1* 3.1* " 3.2*   ALKPHOS 51* 58 57  --    ALT 23 26 26  --    AST 28 31 29  --          Recent Labs   Lab 03/31/23  1046 11/09/23 2028 12/04/23  0959   Color, UA Yellow Yellow Yellow   Appearance, UA Clear Clear Clear   pH, UA 8.0 >8.0 A 8.0   Specific Gravity, UA 1.010 1.015 1.015   Protein, UA 1+ A 2+ A 2+ A   Glucose, UA Negative Trace A 1+ A   Ketones, UA Negative Negative Trace A   Urobilinogen, UA Negative 2.0-3.0 A Negative   Bilirubin (UA) Negative Negative Negative   Occult Blood UA Negative Trace A 2+ A   Nitrite, UA Negative Negative Negative   RBC, UA 0 1 41 H   WBC, UA 0 0 2   Bacteria Negative Negative Negative   Hyaline Casts, UA 1 3 A 6 A         Recent Labs   Lab 11/10/23  1518 11/10/23  1529   POC PH 7.395 7.391   POC PCO2 36.5 37.2   POC HCO3 22.4 L 22.6 L   POC PO2 54 LL 53 LL   POC SATURATED O2 88 87   POC BE -3 L -2   Sample ARTERIAL ARTERIAL         Microbiology Results (last 7 days)       Procedure Component Value Units Date/Time    Blood culture [[id number]] Collected: 12/04/23 0713    Order Status: Completed Specimen: Blood Updated: 12/09/23 0832     Blood Culture, Routine No growth after 5 days.    Narrative:      Collection has been rescheduled by [email or web address] at 12/04/2023 07:14 Reason:   Done per phil  Collection has been rescheduled by [email or web address] at 12/04/2023 07:14 Reason:   Done per phil    Blood culture [[id number]] Collected: 12/04/23 0709    Order Status: Completed Specimen: Blood Updated: 12/09/23 0832     Blood Culture, Routine No growth after 5 days.    Narrative:      Collection has been rescheduled by [email or web address] at 12/04/2023 07:14 Reason:   Done per phil  Collection has been rescheduled by [email or web address] at 12/04/2023 07:14 Reason:   Done per phil          Patient care was time spent personally by me on the following activities:     Obtaining a history  Examination of patient.  Providing medical care at the patients bedside.  Developing a treatment plan with patient or surrogate and bedside  caregivers  Ordering and reviewing laboratory studies, radiographic studies, pulse oximetry.  Ordering and performing treatments and interventions.  Evaluation of patient's response to treatment.  Discussions with consultants while on the unit and immediately available to the patient.  Re-evaluation of the patient's condition.  Documentation in the medical record.     Raleigh Yee MD  Nephrology  [facility] [place]  [phone]

## 2023-12-15 NOTE — PLAN OF CARE
See previous note       12/15/23 0944   Post-Acute Status   Post-Acute Authorization Placement;Dialysis   Post-Acute Placement Status Set-up Complete/Auth obtained   Diaylsis Status Set-up Complete/Auth obtained   Discharge Delays (!) Ambulance Transport/Facility Transport   Discharge Plan   Discharge Plan A Skilled Nursing Facility   Discharge Plan B Skilled Nursing Facility

## 2023-12-15 NOTE — SUBJECTIVE & OBJECTIVE
Interval History: Patient seen and examined. NAEON. Stable no changes.      Objective:     Vital Signs (Most Recent):  Temp: 98.4 °F (36.9 °C) (12/15/23 0009)  Pulse: (!) 59 (12/15/23 0009)  Resp: 20 (12/15/23 0009)  BP: (!) 147/71 (12/15/23 0009)  SpO2: 100 % (12/15/23 0009) Vital Signs (24h Range):  Temp:  [98 °F (36.7 °C)-98.4 °F (36.9 °C)] 98.4 °F (36.9 °C)  Pulse:  [58-66] 59  Resp:  [20-41] 20  SpO2:  [99 %-100 %] 100 %  BP: (109-147)/(56-76) 147/71     Weight: 50.6 kg (111 lb 8.8 oz)  Body mass index is 18.01 kg/m².    Intake/Output Summary (Last 24 hours) at 12/15/2023 0152  Last data filed at 12/14/2023 1350  Gross per 24 hour   Intake 660 ml   Output --   Net 660 ml           Physical Exam  Vitals and nursing note reviewed.   Constitutional:       General: She is not in acute distress.  HENT:      Head: Normocephalic and atraumatic.   Cardiovascular:      Rate and Rhythm: Regular rhythm. Bradycardia present.      Pulses: Normal pulses.      Heart sounds: Normal heart sounds.      Comments: RUE AVF with thrill.  Pulmonary:      Effort: Pulmonary effort is normal. No respiratory distress.      Breath sounds: Normal breath sounds.      Comments: RA.  Musculoskeletal:         General: Normal range of motion.      Cervical back: Neck supple.   Skin:     General: Skin is warm and dry.   Neurological:      General: No focal deficit present.      Mental Status: She is alert and oriented to person, place, and time. Mental status is at baseline.   Psychiatric:         Mood and Affect: Mood normal.         Behavior: Behavior normal.             Significant Labs: All pertinent labs within the past 24 hours have been reviewed.    Significant Imaging: I have reviewed all pertinent imaging results/findings within the past 24 hours.  Review of Systems   Constitutional: Negative.    Respiratory:  Positive for shortness of breath. Negative for cough.    Cardiovascular:  Negative for chest pain.   Gastrointestinal:  Negative  for abdominal pain.   Musculoskeletal:  Positive for arthralgias.

## 2023-12-15 NOTE — ASSESSMENT & PLAN NOTE
No evidence of pneumonia and respiratory failure.  -COVID-19 precautions  -facility agrees to accept patient tomorrow

## 2023-12-15 NOTE — PT/OT/SLP PROGRESS
Occupational Therapy      Patient Name:  Tyra Isaac   MRN:  [id number]    Attempted OT tx. Patient deferred OT tx due to B/B incontinence in adult brief. Will follow-up when appropriate.    12/15/2023

## 2023-12-15 NOTE — PT/OT/SLP PROGRESS
Physical Therapy      Patient Name:  Tyra Isaac   MRN:  [id number]    Patient not seen today secondary to Dialysis. Will follow-up .

## 2023-12-15 NOTE — PROGRESS NOTES
[facility]  Progress Note    Patient Name: Tyra Isaac  MRN: [id number]  Patient Class: IP- Inpatient   Admission Date: 12/4/2023  Length of Stay: 10 days  Attending Physician: Elieser Herrera MD  Primary Care Provider: Kalpesh Goel MD        Subjective:     Principal Problem:Atrial fibrillation        HPI:  Tyra Isaac is an 83 year old female with a past medical history of ESRD (MWF), Afib, HTN, anemia, and CAD who presented with worsening fatigue, myalgias, constipation, and cough. She was supposed to go to HD today, but instead she presented to the ED. She was discovered to have COVID-19 on workup with no evidence of pneumonia on CXR. Nephrology was consulted from the ED HD. Hospital Medicine was consulted for admission.    Overview/Hospital Course:  83M with PMH ESRD (MWF), Afib, HTN, and CAD who presented with acute COVID-19 forcing her to miss HD. She had no evidence of pneumonia or respiratory failure. While undergoing HD 12/4, she developed a narrow QRS complex tachycardia thought to be either SVT of Afib with RVR which resolved after adenosine and administration of a diltiazem infusion. She underwent routine HD. Cardiology was consulted and started the patient on PO sotalol as she was able to be weaned from diltiazem infusion. On 12/7, the patient developed Afib with RVR with hypotension which resolved with IV fluids administration, a dose of IV digoxin, and a bolus of IV amiodarone. The converted to NSR and her BP was at the lower limit of normal. Cardiology started PO diltiazem in addition to the PO sotalol and her vitals were monitored. She became hypotensive and bradycardic requiring addition of midodrine and IVF bolus with discontinuation of PO diltiazem and sotalol. Cardiology re-consulted and recommended to continue current regimen and f/u outpatient w/ EP referral. PT/OT recommended moderate intensity therapy. Pending SNF placement delayed due to COVID  isolation. CXR ordered at facility request.     Interval History: Patient seen and examined. NAEON. Stable no changes.      Objective:     Vital Signs (Most Recent):  Temp: 98.4 °F (36.9 °C) (12/15/23 0009)  Pulse: (!) 59 (12/15/23 0009)  Resp: 20 (12/15/23 0009)  BP: (!) 147/71 (12/15/23 0009)  SpO2: 100 % (12/15/23 0009) Vital Signs (24h Range):  Temp:  [98 °F (36.7 °C)-98.4 °F (36.9 °C)] 98.4 °F (36.9 °C)  Pulse:  [58-66] 59  Resp:  [20-41] 20  SpO2:  [99 %-100 %] 100 %  BP: (109-147)/(56-76) 147/71     Weight: 50.6 kg (111 lb 8.8 oz)  Body mass index is 18.01 kg/m².    Intake/Output Summary (Last 24 hours) at 12/15/2023 0152  Last data filed at 12/14/2023 1350  Gross per 24 hour   Intake 660 ml   Output --   Net 660 ml           Physical Exam  Vitals and nursing note reviewed.   Constitutional:       General: She is not in acute distress.  HENT:      Head: Normocephalic and atraumatic.   Cardiovascular:      Rate and Rhythm: Regular rhythm. Bradycardia present.      Pulses: Normal pulses.      Heart sounds: Normal heart sounds.      Comments: RUE AVF with thrill.  Pulmonary:      Effort: Pulmonary effort is normal. No respiratory distress.      Breath sounds: Normal breath sounds.      Comments: RA.  Musculoskeletal:         General: Normal range of motion.      Cervical back: Neck supple.   Skin:     General: Skin is warm and dry.   Neurological:      General: No focal deficit present.      Mental Status: She is alert and oriented to person, place, and time. Mental status is at baseline.   Psychiatric:         Mood and Affect: Mood normal.         Behavior: Behavior normal.             Significant Labs: All pertinent labs within the past 24 hours have been reviewed.    Significant Imaging: I have reviewed all pertinent imaging results/findings within the past 24 hours.  Review of Systems   Constitutional: Negative.    Respiratory:  Positive for shortness of breath. Negative for cough.    Cardiovascular:   Negative for chest pain.   Gastrointestinal:  Negative for abdominal pain.   Musculoskeletal:  Positive for arthralgias.       Assessment/Plan:      * Atrial fibrillation  RVR resolved now sinus zane  Off rate and rhythm control meds due to bradycardia and hypotension  -continue eliquis  -monitor vitals  -Telemetry  -Cardiology re-consulted; appreciate recs to continue current care and f/u outpatient w/ EP referral    COVID-19  No evidence of pneumonia and respiratory failure.  -COVID-19 precautions  -facility agrees to accept patient tomorrow    CAD s/p PCI  -Continue home statin, eliquis, BP control  -Telemetry    Anxiety and depression  Patient has persistent depression which is mild and is currently controlled. Will Continue anti-depressant medications. We will not consult psychiatry at this time. Patient does not display psychosis at this time. Continue to monitor closely and adjust plan of care as needed.        Anemia of chronic disease  Stable.  -Trend Hgb with CBC    Debility  - PT/OT; rec moderate intensity therapy  - CM consult for SNF; delayed due to COVID (needs 10 days since positive test).    Benign hypertension with ESRD (end-stage renal disease)  BP stable  -Continue new midodrine  -Continue to monitor  -HD per Nephrology  -Renally dose medications      VTE Risk Mitigation (From admission, onward)           Ordered     apixaban tablet 2.5 mg  2 times daily         12/04/23 1145     IP VTE HIGH RISK PATIENT  Once         12/04/23 1145     Place sequential compression device  Until discontinued         12/04/23 1145     Reason for No Pharmacological VTE Prophylaxis  Once        Question:  Reasons:  Answer:  Already adequately anticoagulated on oral Anticoagulants    12/04/23 1145     heparin (porcine) injection 5,000 Units  As needed (PRN)         12/04/23 1145                    Discharge Planning   ILAN: 12/15/2023     Code Status: Full Code   Is the patient medically ready for discharge?:      Reason for patient still in hospital (select all that apply): Patient trending condition and Treatment  Discharge Plan A: Skilled Nursing Facility                  Indu Del Valle MD  Department of Hospital Medicine   [facility]

## 2023-12-15 NOTE — NURSING
Pt transferred to new room via bed from ICU stepdown to telemetry. Pt belongings and call light within reach. Pt oriented to new room. Bed alarm on. Pt instructed to call is anything is needed.

## 2023-12-16 NOTE — PROGRESS NOTES
12/15/23 1735   Vital Signs   Temp 98.1 °F (36.7 °C)   Pulse 69   Resp 18   SpO2 99 %   /61   Post-Hemodialysis Assessment   Rinseback Volume (mL) 250 mL   Blood Volume Processed (Liters) 68.1 L   Dialyzer Clearance Lightly streaked   Duration of Treatment 180 minutes   Total UF (mL) 785 mL   Net Fluid Removal 285   Patient Response to Treatment unable to pull UF goal.   Post-Treatment Weight 49 kg (108 lb 0.4 oz)   Treatment Weight Change -0.5   Arterial bleeding stop time (min) 8 min   Venous bleeding stop time (min) 8 min   Post-Hemodialysis Comments no problems post tx

## 2023-12-16 NOTE — DISCHARGE SUMMARY
[facility] Medicine  Discharge Summary      Patient Name: Tyra Isaac  MRN: [id number]  [facility]: [id number]  Patient Class: IP- Inpatient  Admission Date: 12/4/2023  Hospital Length of Stay: 10 days  Discharge Date and Time: 12/15/2023  7:00 PM  Attending Physician: No att. providers found   Discharging Provider: Melissa Wu MD  Primary Care Provider: Kalpesh Goel MD    Primary Care Team: Networked reference to record PCT     HPI:   Tyra Isaac is an 83 year old female with a past medical history of ESRD (MWF), Afib, HTN, anemia, and CAD who presented with worsening fatigue, myalgias, constipation, and cough. She was supposed to go to HD today, but instead she presented to the ED. She was discovered to have COVID-19 on workup with no evidence of pneumonia on CXR. Nephrology was consulted from the ED HD. Hospital Medicine was consulted for admission.    * No surgery found *      Hospital Course:   83M with PMH ESRD (MWF), Afib, HTN, and CAD who presented with acute COVID-19 which caused her to miss her outpatient HD session. She had no evidence of pneumonia or respiratory failure. While undergoing HD 12/4, she developed a narrow QRS complex tachycardia thought to be either SVT of Afib with RVR (later impression atrial fibrillation) which resolved administration of a diltiazem infusion. She underwent routine HD. Cardiology was consulted and started the patient on PO sotalol as she was able to be weaned from diltiazem infusion. On 12/7, the patient developed Afib with RVR with hypotension which resolved with IV fluids administration, a dose of IV digoxin, and a bolus of IV amiodarone. She converted to NSR and her BP was at the lower limit of normal. Cardiology started PO diltiazem in addition to the PO sotalol and her vitals were monitored. She became hypotensive and bradycardic requiring addition of midodrine and IVF bolus with discontinuation of PO diltiazem and sotalol.  Cardiology re-consulted and recommended to continue current regimen and f/u outpatient w/ EP referral. PT/OT recommended moderate intensity therapy. Pending SNF placement delayed due to COVID isolation.       Assumed care of this patient on 12/15/23.  Patient with prolonged hospital stay.  Initially admitted with COVID-19, no evidence of hypoxia or pneumonia, supportive care with isolation precaution.  ESRD on HD MWF via right upper extremity AVF, hospital course complicated by recurrent arrhythmia with hypotension.  Known history of paroxysmal atrial fibrillation, followed by Dr. Thompson, on outpatient diltiazem, sotalol, Eliquis.  She had recurrent tachyarrhythmia, ultimately felt to be atrial fibrillation with RVR, seen by Cardiology, medications were adjusted but ultimately discontinued due to bradycardia and hypotension, monitored closely without any recurrent arrhythmias and tolerating dialysis.  Midodrine was initiated for hypotension with improvement.  Cardiology recommended outpatient EP referral.  Previous echo with EF of 57% with moderate to severe tricuspid regurgitation, mild to moderate mitral regurgitation.  She was evaluated by PT/OT, recommendations for skilled nursing placement but delayed due to isolation for COVID-19, ultimately accepted 12/15.  She had dialysis prior to discharge and tolerated well without any hemodynamic instability.  Denies any new complaints on discharge, cleared by consultants for discharge and appears medically stable for discharge with outpatient follow-up.  Discharge plan was reviewed with patient, no questions or concerns.  Discussed with nursing and case management.      Discharge examination   Lying in bed, alert, oriented, on room air, regular heart rhythm, right upper extremity AVF     Goals of Care Treatment Preferences:  Code Status: Full Code      Consults:   Consults (From admission, onward)          Status Ordering Provider     IP consult to case management   Once        Provider:  (Not yet assigned)    Completed LIONEL WASHINGTON     Inpatient consult to Cardiology  Once        Provider:  Valdo Cotton MD    Completed LIONEL WASHINGTON     Inpatient consult to Nephrology  Once        Provider:  Jimenez Valero MD    Completed LIONEL WASHINGTON.            No new Assessment & Plan notes have been filed under this hospital service since the last note was generated.  Service: Hospital Medicine    Final Active Diagnoses:    Diagnosis Date Noted POA    PRINCIPAL PROBLEM:  Atrial fibrillation [I48.91] 12/09/2022 Yes    ESRD (end stage renal disease) [N18.6] 12/15/2023 Yes    Macrocytic anemia [D53.9] 12/15/2023 Yes    CAD s/p PCI [I25.10] 12/09/2022 Yes     Chronic    Anemia of chronic disease [D63.8] 01/27/2022 Yes     Chronic    Anxiety and depression [F41.9, F32.A] 01/27/2022 Yes     Chronic    Debility [R53.81] 06/25/2021 Yes    Long term (current) use of anticoagulants [Z79.01] 12/13/2019 Not Applicable    Chronic gouty arthropathy [M1A.00X0] 12/20/2017 Yes      Problems Resolved During this Admission:    Diagnosis Date Noted Date Resolved POA    COVID-19 [U07.1] 12/04/2023 12/15/2023 Yes    SVT (supraventricular tachycardia) [I47.10] 12/04/2023 12/05/2023 Yes       Discharged Condition: good    Disposition: Skilled Nursing Facility    Follow Up:   Follow-up Information       [facility] Follow up.    Specialty: Skilled Nursing Facility  Contact information:  [address]  [facility] [address]  [phone]             Kalpesh Goel MD. Schedule an appointment as soon as possible for a visit in 2 week(s).    Specialty: Internal Medicine  Why: follow up  Contact information:  [address] [facility]  [address]  [place] [address]  [phone]               Harsh Thompson Jr., MD. Schedule an appointment as soon as possible for a visit in 2 week(s).    Specialty: Cardiology  Why: follow up  Contact information:  [address] [facility]  "[id number]  [phone]               MADDIE BENJAMIN [address] Follow up.    Contact information:  [address] [facility] [address] [phone]             Maddie  Zeke Huang .    Contact information:  [address]  [place] [id number] [phone]                         Patient Instructions:      Ambulatory referral/consult to Cardiac Electrophysiology   Standing Status: Future   Referral Priority: Routine Referral Type: Consultation   Referral Reason: Specialty Services Required   Requested Specialty: Cardiology   Number of Visits Requested: 1     Ambulatory referral/consult to Electrophysiology   Standing Status: Future   Referral Priority: Routine Referral Type: Consultation   Referral Reason: Specialty Services Required   Requested Specialty: Electrophysiology   Number of Visits Requested: 1     Diet renal     Notify your health care provider if you experience any of the following:  temperature >100.4     Notify your health care provider if you experience any of the following:  difficulty breathing or increased cough     Notify your health care provider if you experience any of the following:  persistent dizziness, light-headedness, or visual disturbances     Notify your health care provider if you experience any of the following:  increased confusion or weakness     Activity as tolerated       Significant Diagnostic Studies: Labs: BMP:   Recent Labs   Lab 12/15/23  0537   MG 2.4   , CMP No results for input(s): "NA", "K", "CL", "CO2", "GLU", "BUN", "CREATININE", "CALCIUM", "PROT", "ALBUMIN", "BILITOT", "ALKPHOS", "AST", "ALT", "ANIONGAP", "ESTGFRAFRICA", "EGFRNONAA" in the last 48 hours., CBC   Recent Labs   Lab 12/15/23  0537   WBC 6.69   HGB 10.3*   HCT 33.4*      , INR   Lab Results   Component Value Date    INR 1.1 11/09/2023    INR 1.5 (H) 07/21/2023    INR 1.6 (H) 07/20/2023   , Lipid Panel No results found for: "CHOL", "HDL", "LDLCALC", "TRIG", "CHOLHDL", and " "Troponin No results for input(s): "TROPONINI" in the last 168 hours.    Pending Diagnostic Studies:       None         X-Ray Chest 1 View    Result Date: 12/15/2023  CLINICAL HISTORY: 83 years (1940) Female requirement for nursing home admission TECHNIQUE: Portable AP radiograph the chest. One view. COMPARISON: Radiograph from December 4, 2023. FINDINGS: The lungs are clear. Costophrenic angles are seen without effusion. No pneumothorax is identified. The heart is normal in size. Atheromatous calcifications are seen at the aortic arch. Osseous structures appear within normal limits. The visualized upper abdomen is unremarkable. IMPRESSION: No acute cardiac or pulmonary process. . Electronically signed by:  Rahul Palacio MD  12/15/2023 06:53 AM CST Workstation: [id number]    X-Ray Chest AP Portable    Result Date: 12/4/2023  XR CHEST 1 VIEW CLINICAL HISTORY: 83 years Female Chest Pain COMPARISON: November 9, 2023 FINDINGS: Cardiac silhouette size is within normal limits. Atherosclerotic calcification of the aorta. No confluent airspace disease. No pleural effusion or pneumothorax. No acute osseous abnormality. IMPRESSION: No acute pulmonary pathology. Electronically signed by:  Aleksandar Castillo MD  12/04/2023 08:16 AM CST Workstation: [id number]     Medications:  Reconciled Home Medications:      Medication List        START taking these medications      midodrine 5 MG Tab  Commonly known as: PROAMATINE  Take 1 tablet (5 mg total) by mouth 3 (three) times daily.     senna-docusate 8.6-50 mg 8.6-50 mg per tablet  Commonly known as: PERICOLACE  Take 1 tablet by mouth 2 (two) times daily as needed for Constipation.            CHANGE how you take these medications      loperamide 2 mg Tab  Commonly known as: IMODIUM A-D  Take 1 tablet (2 mg total) by mouth 4 (four) times daily as needed (diarrhea).  What changed: reasons to take this            CONTINUE taking these medications      atorvastatin 40 MG " tablet  Commonly known as: LIPITOR  Take 40 mg by mouth once daily.     b complex vitamins tablet  Take 1 tablet by mouth once daily.     dorzolamide-timolol 2-0.5% 22.3-6.8 mg/mL ophthalmic solution  Commonly known as: COSOPT  Place 1 drop into both eyes 2 (two) times daily.     ELIQUIS 2.5 mg Tab  Generic drug: apixaban  Take 2.5 mg by mouth 2 (two) times daily.     latanoprost 0.005 % ophthalmic solution  Place 1 drop into both eyes every evening.     ondansetron 4 MG tablet  Commonly known as: ZOFRAN  Take 1 tablet (4 mg total) by mouth every 8 (eight) hours as needed for Nausea.            STOP taking these medications      calcium acetate(phosphat bind) 667 mg tablet  Commonly known as: PHOSLO     diltiaZEM 120 MG Cp24  Commonly known as: CARDIZEM CD     sotaloL 80 MG tablet  Commonly known as: BETAPACE              Indwelling Lines/Drains at time of discharge:   Lines/Drains/Airways       Drain  Duration                  Hemodialysis AV Fistula Right upper arm -- days                    Time spent on the discharge of patient: 36 minutes         Melissa Wu MD  Department of Hospital Medicine  [facility]

## 2023-12-20 ENCOUNTER — HOSPITAL ENCOUNTER (INPATIENT)
Facility: HOSPITAL | Age: 83
LOS: 1 days | Discharge: NURSING, REHAB OR LONG-TERM CARE | DRG: 312 | End: 2023-12-21
Attending: EMERGENCY MEDICINE | Admitting: STUDENT IN AN ORGANIZED HEALTH CARE EDUCATION/TRAINING PROGRAM
Payer: MEDICARE

## 2023-12-20 VITALS
WEIGHT: 111.56 LBS | BODY MASS INDEX: 17.93 KG/M2 | OXYGEN SATURATION: 99 % | DIASTOLIC BLOOD PRESSURE: 62 MMHG | SYSTOLIC BLOOD PRESSURE: 101 MMHG | HEART RATE: 69 BPM | HEIGHT: 66 IN | RESPIRATION RATE: 18 BRPM | TEMPERATURE: 98 F

## 2023-12-20 DIAGNOSIS — R53.1 WEAKNESS: ICD-10-CM

## 2023-12-20 DIAGNOSIS — I95.9 HYPOTENSION: ICD-10-CM

## 2023-12-20 DIAGNOSIS — R57.1 HYPOVOLEMIC SHOCK: ICD-10-CM

## 2023-12-20 DIAGNOSIS — R79.89 ELEVATED TROPONIN: ICD-10-CM

## 2023-12-20 DIAGNOSIS — I95.9 HYPOTENSION, UNSPECIFIED HYPOTENSION TYPE: Primary | ICD-10-CM

## 2023-12-20 DIAGNOSIS — R94.31 T WAVE INVERSION IN EKG: ICD-10-CM

## 2023-12-20 DIAGNOSIS — N18.6 ESRD (END STAGE RENAL DISEASE): ICD-10-CM

## 2023-12-20 DIAGNOSIS — I49.9 CARDIAC RHYTHM DISORDER OR DISTURBANCE OR CHANGE: ICD-10-CM

## 2023-12-20 DIAGNOSIS — I48.91 ATRIAL FIBRILLATION WITH RVR: ICD-10-CM

## 2023-12-20 LAB
ALBUMIN SERPL BCP-MCNC: 3.1 G/DL (ref 3.5–5.2)
ALP SERPL-CCNC: 50 U/L (ref 55–135)
ALT SERPL W/O P-5'-P-CCNC: 12 U/L (ref 10–44)
ANION GAP SERPL CALC-SCNC: 11 MMOL/L (ref 8–16)
AST SERPL-CCNC: 21 U/L (ref 10–40)
BACTERIA #/AREA URNS HPF: ABNORMAL /HPF
BASOPHILS # BLD AUTO: 0.03 K/UL (ref 0–0.2)
BASOPHILS NFR BLD: 0.6 % (ref 0–1.9)
BILIRUB SERPL-MCNC: 0.5 MG/DL (ref 0.1–1)
BILIRUB UR QL STRIP: NEGATIVE
BNP SERPL-MCNC: 638 PG/ML (ref 0–99)
BUN SERPL-MCNC: 17 MG/DL (ref 8–23)
CALCIUM SERPL-MCNC: 7.2 MG/DL (ref 8.7–10.5)
CHLORIDE SERPL-SCNC: 101 MMOL/L (ref 95–110)
CLARITY UR: CLEAR
CO2 SERPL-SCNC: 26 MMOL/L (ref 23–29)
COLOR UR: YELLOW
CREAT SERPL-MCNC: 4.2 MG/DL (ref 0.5–1.4)
DIFFERENTIAL METHOD BLD: ABNORMAL
EOSINOPHIL # BLD AUTO: 0.4 K/UL (ref 0–0.5)
EOSINOPHIL NFR BLD: 9 % (ref 0–8)
ERYTHROCYTE [DISTWIDTH] IN BLOOD BY AUTOMATED COUNT: 15.9 % (ref 11.5–14.5)
EST. GFR  (NO RACE VARIABLE): 10 ML/MIN/1.73 M^2
GLUCOSE SERPL-MCNC: 81 MG/DL (ref 70–110)
GLUCOSE UR QL STRIP: NEGATIVE
HCT VFR BLD AUTO: 27.6 % (ref 37–48.5)
HGB BLD-MCNC: 8.6 G/DL (ref 12–16)
HGB UR QL STRIP: NEGATIVE
HYALINE CASTS #/AREA URNS LPF: 8 /LPF
IMM GRANULOCYTES # BLD AUTO: 0.01 K/UL (ref 0–0.04)
IMM GRANULOCYTES NFR BLD AUTO: 0.2 % (ref 0–0.5)
INFLUENZA A, MOLECULAR: NEGATIVE
INFLUENZA B, MOLECULAR: NEGATIVE
KETONES UR QL STRIP: NEGATIVE
LACTATE SERPL-SCNC: 1.3 MMOL/L (ref 0.5–1.9)
LEUKOCYTE ESTERASE UR QL STRIP: NEGATIVE
LYMPHOCYTES # BLD AUTO: 1 K/UL (ref 1–4.8)
LYMPHOCYTES NFR BLD: 21.5 % (ref 18–48)
MAGNESIUM SERPL-MCNC: 1.6 MG/DL (ref 1.6–2.6)
MCH RBC QN AUTO: 32.1 PG (ref 27–31)
MCHC RBC AUTO-ENTMCNC: 31.2 G/DL (ref 32–36)
MCV RBC AUTO: 103 FL (ref 82–98)
MICROSCOPIC COMMENT: ABNORMAL
MONOCYTES # BLD AUTO: 0.8 K/UL (ref 0.3–1)
MONOCYTES NFR BLD: 16.1 % (ref 4–15)
NEUTROPHILS # BLD AUTO: 2.5 K/UL (ref 1.8–7.7)
NEUTROPHILS NFR BLD: 52.6 % (ref 38–73)
NITRITE UR QL STRIP: NEGATIVE
NRBC BLD-RTO: 0 /100 WBC
PH UR STRIP: 6 [PH] (ref 5–8)
PLATELET # BLD AUTO: 232 K/UL (ref 150–450)
PMV BLD AUTO: 9.5 FL (ref 9.2–12.9)
POTASSIUM SERPL-SCNC: 3.2 MMOL/L (ref 3.5–5.1)
PROT SERPL-MCNC: 5.8 G/DL (ref 6–8.4)
PROT UR QL STRIP: ABNORMAL
RBC # BLD AUTO: 2.68 M/UL (ref 4–5.4)
RBC #/AREA URNS HPF: 0 /HPF (ref 0–4)
SODIUM SERPL-SCNC: 138 MMOL/L (ref 136–145)
SP GR UR STRIP: 1.01 (ref 1–1.03)
SPECIMEN SOURCE: NORMAL
SQUAMOUS #/AREA URNS HPF: 11 /HPF
TROPONIN I SERPL HS-MCNC: 28 PG/ML (ref 0–14.9)
URN SPEC COLLECT METH UR: ABNORMAL
UROBILINOGEN UR STRIP-ACNC: NEGATIVE EU/DL
WBC # BLD AUTO: 4.79 K/UL (ref 3.9–12.7)
WBC #/AREA URNS HPF: 3 /HPF (ref 0–5)

## 2023-12-20 PROCEDURE — 25000003 PHARM REV CODE 250

## 2023-12-20 PROCEDURE — 80053 COMPREHEN METABOLIC PANEL: CPT | Performed by: EMERGENCY MEDICINE

## 2023-12-20 PROCEDURE — 36415 COLL VENOUS BLD VENIPUNCTURE: CPT | Performed by: EMERGENCY MEDICINE

## 2023-12-20 PROCEDURE — 93010 ELECTROCARDIOGRAM REPORT: CPT | Mod: 76,,, | Performed by: GENERAL PRACTICE

## 2023-12-20 PROCEDURE — 99223 1ST HOSP IP/OBS HIGH 75: CPT | Mod: ,,, | Performed by: STUDENT IN AN ORGANIZED HEALTH CARE EDUCATION/TRAINING PROGRAM

## 2023-12-20 PROCEDURE — 93005 ELECTROCARDIOGRAM TRACING: CPT | Performed by: GENERAL PRACTICE

## 2023-12-20 PROCEDURE — 63600175 PHARM REV CODE 636 W HCPCS

## 2023-12-20 PROCEDURE — 96366 THER/PROPH/DIAG IV INF ADDON: CPT

## 2023-12-20 PROCEDURE — 85014 HEMATOCRIT: CPT

## 2023-12-20 PROCEDURE — 25000003 PHARM REV CODE 250: Performed by: NURSE PRACTITIONER

## 2023-12-20 PROCEDURE — 96365 THER/PROPH/DIAG IV INF INIT: CPT

## 2023-12-20 PROCEDURE — 87502 INFLUENZA DNA AMP PROBE: CPT | Performed by: EMERGENCY MEDICINE

## 2023-12-20 PROCEDURE — 99291 CRITICAL CARE FIRST HOUR: CPT

## 2023-12-20 PROCEDURE — 94799 UNLISTED PULMONARY SVC/PX: CPT

## 2023-12-20 PROCEDURE — 82330 ASSAY OF CALCIUM: CPT

## 2023-12-20 PROCEDURE — 84295 ASSAY OF SERUM SODIUM: CPT

## 2023-12-20 PROCEDURE — 82803 BLOOD GASES ANY COMBINATION: CPT

## 2023-12-20 PROCEDURE — 83735 ASSAY OF MAGNESIUM: CPT | Performed by: EMERGENCY MEDICINE

## 2023-12-20 PROCEDURE — 99900035 HC TECH TIME PER 15 MIN (STAT)

## 2023-12-20 PROCEDURE — 84132 ASSAY OF SERUM POTASSIUM: CPT

## 2023-12-20 PROCEDURE — 93010 ELECTROCARDIOGRAM REPORT: CPT | Mod: ,,, | Performed by: GENERAL PRACTICE

## 2023-12-20 PROCEDURE — 81001 URINALYSIS AUTO W/SCOPE: CPT | Performed by: EMERGENCY MEDICINE

## 2023-12-20 PROCEDURE — 21400001 HC TELEMETRY ROOM

## 2023-12-20 PROCEDURE — 83605 ASSAY OF LACTIC ACID: CPT | Performed by: EMERGENCY MEDICINE

## 2023-12-20 PROCEDURE — 25000003 PHARM REV CODE 250: Performed by: EMERGENCY MEDICINE

## 2023-12-20 PROCEDURE — 87040 BLOOD CULTURE FOR BACTERIA: CPT | Performed by: EMERGENCY MEDICINE

## 2023-12-20 PROCEDURE — 85025 COMPLETE CBC W/AUTO DIFF WBC: CPT | Performed by: EMERGENCY MEDICINE

## 2023-12-20 PROCEDURE — 84484 ASSAY OF TROPONIN QUANT: CPT | Performed by: EMERGENCY MEDICINE

## 2023-12-20 PROCEDURE — 83880 ASSAY OF NATRIURETIC PEPTIDE: CPT | Performed by: EMERGENCY MEDICINE

## 2023-12-20 PROCEDURE — 25000003 PHARM REV CODE 250: Performed by: STUDENT IN AN ORGANIZED HEALTH CARE EDUCATION/TRAINING PROGRAM

## 2023-12-20 PROCEDURE — 36600 WITHDRAWAL OF ARTERIAL BLOOD: CPT

## 2023-12-20 PROCEDURE — 63600175 PHARM REV CODE 636 W HCPCS: Performed by: EMERGENCY MEDICINE

## 2023-12-20 PROCEDURE — 96375 TX/PRO/DX INJ NEW DRUG ADDON: CPT

## 2023-12-20 RX ORDER — SODIUM CHLORIDE 9 MG/ML
500 INJECTION, SOLUTION INTRAVENOUS
Status: COMPLETED | OUTPATIENT
Start: 2023-12-20 | End: 2023-12-20

## 2023-12-20 RX ORDER — MAGNESIUM SULFATE HEPTAHYDRATE 40 MG/ML
2 INJECTION, SOLUTION INTRAVENOUS ONCE
Status: COMPLETED | OUTPATIENT
Start: 2023-12-20 | End: 2023-12-21

## 2023-12-20 RX ORDER — ONDANSETRON HYDROCHLORIDE 2 MG/ML
4 INJECTION, SOLUTION INTRAVENOUS
Status: COMPLETED | OUTPATIENT
Start: 2023-12-20 | End: 2023-12-20

## 2023-12-20 RX ORDER — LATANOPROST 50 UG/ML
1 SOLUTION/ DROPS OPHTHALMIC NIGHTLY
Status: DISCONTINUED | OUTPATIENT
Start: 2023-12-20 | End: 2023-12-21 | Stop reason: HOSPADM

## 2023-12-20 RX ORDER — SODIUM CHLORIDE 0.9 % (FLUSH) 0.9 %
10 SYRINGE (ML) INJECTION
Status: DISCONTINUED | OUTPATIENT
Start: 2023-12-20 | End: 2023-12-21 | Stop reason: HOSPADM

## 2023-12-20 RX ORDER — TALC
6 POWDER (GRAM) TOPICAL NIGHTLY PRN
Status: DISCONTINUED | OUTPATIENT
Start: 2023-12-20 | End: 2023-12-21 | Stop reason: HOSPADM

## 2023-12-20 RX ORDER — MUPIROCIN 20 MG/G
OINTMENT TOPICAL 2 TIMES DAILY
Status: DISCONTINUED | OUTPATIENT
Start: 2023-12-20 | End: 2023-12-21 | Stop reason: HOSPADM

## 2023-12-20 RX ORDER — MIDODRINE HYDROCHLORIDE 2.5 MG/1
5 TABLET ORAL
Status: DISCONTINUED | OUTPATIENT
Start: 2023-12-20 | End: 2023-12-21

## 2023-12-20 RX ORDER — NOREPINEPHRINE BITARTRATE/D5W 4MG/250ML
PLASTIC BAG, INJECTION (ML) INTRAVENOUS
Status: COMPLETED
Start: 2023-12-20 | End: 2023-12-20

## 2023-12-20 RX ORDER — ONDANSETRON HYDROCHLORIDE 2 MG/ML
4 INJECTION, SOLUTION INTRAVENOUS EVERY 8 HOURS PRN
Status: DISCONTINUED | OUTPATIENT
Start: 2023-12-20 | End: 2023-12-21 | Stop reason: HOSPADM

## 2023-12-20 RX ORDER — AMIODARONE HYDROCHLORIDE 150 MG/3ML
150 INJECTION, SOLUTION INTRAVENOUS
Status: DISCONTINUED | OUTPATIENT
Start: 2023-12-20 | End: 2023-12-20

## 2023-12-20 RX ORDER — DORZOLAMIDE HYDROCHLORIDE AND TIMOLOL MALEATE 20; 5 MG/ML; MG/ML
1 SOLUTION/ DROPS OPHTHALMIC 2 TIMES DAILY
Status: DISCONTINUED | OUTPATIENT
Start: 2023-12-20 | End: 2023-12-21 | Stop reason: HOSPADM

## 2023-12-20 RX ORDER — POTASSIUM CHLORIDE 20 MEQ/1
20 TABLET, EXTENDED RELEASE ORAL ONCE
Status: COMPLETED | OUTPATIENT
Start: 2023-12-20 | End: 2023-12-20

## 2023-12-20 RX ORDER — ATORVASTATIN CALCIUM 40 MG/1
40 TABLET, FILM COATED ORAL NIGHTLY
Status: DISCONTINUED | OUTPATIENT
Start: 2023-12-20 | End: 2023-12-21 | Stop reason: HOSPADM

## 2023-12-20 RX ORDER — NOREPINEPHRINE BITARTRATE/D5W 4MG/250ML
0-3 PLASTIC BAG, INJECTION (ML) INTRAVENOUS CONTINUOUS
Status: DISCONTINUED | OUTPATIENT
Start: 2023-12-20 | End: 2023-12-20

## 2023-12-20 RX ORDER — SODIUM CHLORIDE 9 MG/ML
INJECTION, SOLUTION INTRAVENOUS ONCE
Status: CANCELLED | OUTPATIENT
Start: 2023-12-20 | End: 2023-12-20

## 2023-12-20 RX ADMIN — POTASSIUM CHLORIDE 20 MEQ: 1500 TABLET, EXTENDED RELEASE ORAL at 10:12

## 2023-12-20 RX ADMIN — DRONEDARONE 400 MG: 400 TABLET, FILM COATED ORAL at 10:12

## 2023-12-20 RX ADMIN — MIDODRINE HYDROCHLORIDE 5 MG: 2.5 TABLET ORAL at 10:12

## 2023-12-20 RX ADMIN — MUPIROCIN 1 G: 20 OINTMENT TOPICAL at 10:12

## 2023-12-20 RX ADMIN — SODIUM CHLORIDE 500 ML: 0.9 INJECTION, SOLUTION INTRAVENOUS at 11:12

## 2023-12-20 RX ADMIN — APIXABAN 2.5 MG: 2.5 TABLET, FILM COATED ORAL at 10:12

## 2023-12-20 RX ADMIN — NOREPINEPHRINE BITARTRATE 0.05 MCG/KG/MIN: 4 INJECTION, SOLUTION INTRAVENOUS at 11:12

## 2023-12-20 RX ADMIN — LATANOPROST 1 DROP: 50 SOLUTION/ DROPS OPHTHALMIC at 10:12

## 2023-12-20 RX ADMIN — MAGNESIUM SULFATE HEPTAHYDRATE 2 G: 40 INJECTION, SOLUTION INTRAVENOUS at 10:12

## 2023-12-20 RX ADMIN — ONDANSETRON 4 MG: 2 INJECTION INTRAMUSCULAR; INTRAVENOUS at 11:12

## 2023-12-20 RX ADMIN — Medication 0.05 MCG/KG/MIN: at 11:12

## 2023-12-20 RX ADMIN — ATORVASTATIN CALCIUM 40 MG: 40 TABLET, FILM COATED ORAL at 10:12

## 2023-12-20 RX ADMIN — DORZOLAMIDE HYDROCHLORIDE AND TIMOLOL MALEATE 1 DROP: 22.3; 6.8 SOLUTION/ DROPS OPHTHALMIC at 10:12

## 2023-12-20 NOTE — ASSESSMENT & PLAN NOTE
Patient with Paroxysmal (<7 days) atrial fibrillation which is controlled currently with Amiodarone. Patient is currently in sinus rhythm.[id number] Score: 4. HASBLED Score: . Anticoagulation indicated. Anticoagulation done with Eliquis .  Patient already converted, IV amiodarone currently on hold  Resume home medications  Cardiology consulted

## 2023-12-20 NOTE — ASSESSMENT & PLAN NOTE
Patient bolused fluids in the ED  Currently on IV Levophed GTT  Resume home medication of midodrine  Wean as tolerated  Cardiac telemetry

## 2023-12-20 NOTE — ASSESSMENT & PLAN NOTE
Creatine stable for now. BMP reviewed- noted Estimated Creatinine Clearance: 8.1 mL/min (A) (based on SCr of 4.2 mg/dL (H)). according to latest data. Based on current GFR, CKD stage is end stage.  Monitor UOP and serial BMP and adjust therapy as needed. Renally dose meds. Avoid nephrotoxic medications and procedures.  Patient was on dialysis during episode  Consult Nephrology

## 2023-12-20 NOTE — SUBJECTIVE & OBJECTIVE
Past Medical History:   Diagnosis Date    A-fib     Anxiety     Depression     Disorder of kidney and ureter     Encephalopathy acute 1/1/2018    End stage kidney disease 6/17/2017    Gout     Hyperlipidemia     Hypertension     Moderate episode of recurrent major depressive disorder 1/17/2018    Nephropathy hypertensive, 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    Osteopenia of multiple sites 3/9/2018    Based upon bone density measurements. Patient also has chronic kidney disease.    Stroke 11/2016       Past Surgical History:   Procedure Laterality Date    ANGIOGRAM, CORONARY, WITH LEFT HEART CATHETERIZATION N/A 2/7/2022    Procedure: Angiogram, Coronary, with Left Heart Cath;  Surgeon: Gino Leal MD;  Location: [facility] CATH/EP LAB;  Service: Cardiology;  Laterality: N/A;    CARDIAC SURGERY      stents    EYE SURGERY      WRIST SURGERY         Review of patient's allergies indicates:   Allergen Reactions    Cyclobenzaprine     Fish containing products Hives    Peanut Other (See Comments)    Tramadol Itching       No current facility-administered medications on file prior to encounter.     Current Outpatient Medications on File Prior to Encounter   Medication Sig    atorvastatin (LIPITOR) 40 MG tablet Take 40 mg by mouth once daily.    b complex vitamins tablet Take 1 tablet by mouth once daily.    dorzolamide-timolol 2-0.5% (COSOPT) 22.3-6.8 mg/mL ophthalmic solution Place 1 drop into both eyes 2 (two) times daily.    ELIQUIS 2.5 mg Tab Take 2.5 mg by mouth 2 (two) times daily.    latanoprost 0.005 % ophthalmic solution Place 1 drop into both eyes every evening.    loperamide (IMODIUM A-D) 2 mg Tab Take 1 tablet (2 mg total) by mouth 4 (four) times daily as needed (diarrhea).    midodrine (PROAMATINE) 5 MG Tab Take 1 tablet (5 mg total) by mouth 3 (three) times daily.    ondansetron  (ZOFRAN) 4 MG tablet Take 1 tablet (4 mg total) by mouth every 8 (eight) hours as needed for Nausea.    senna-docusate 8.6-50 mg (PERICOLACE) 8.6-50 mg per tablet Take 1 tablet by mouth 2 (two) times daily as needed for Constipation.     Family History       Problem Relation (Age of Onset)    Cancer Father    Heart disease Mother          Tobacco Use    Smoking status: Never    Smokeless tobacco: Never   Substance and Sexual Activity    Alcohol use: No    Drug use: No    Sexual activity: Not Currently     Review of Systems   Constitutional:  Positive for fatigue. Negative for fever.   Respiratory:  Positive for shortness of breath. Negative for wheezing.    Cardiovascular:  Positive for palpitations. Negative for chest pain.   Gastrointestinal:  Positive for vomiting.   Neurological:  Positive for weakness.   All other systems reviewed and are negative.    Objective:     Vital Signs (Most Recent):  Temp: 96.1 °F (35.6 °C) (12/20/23 1250)  Pulse: 74 (12/20/23 1240)  Resp: 20 (12/20/23 1240)  BP: (!) 137/58 (12/20/23 1240)  SpO2: 100 % (12/20/23 1240) Vital Signs (24h Range):  Temp:  [96.1 °F (35.6 °C)] 96.1 °F (35.6 °C)  Pulse:  [] 74  Resp:  [20-49] 20  SpO2:  [90 %-100 %] 100 %  BP: ()/(37-58) 137/58     Weight: 50.3 kg (111 lb)  Body mass index is 17.92 kg/m².     Physical Exam  Vitals and nursing note reviewed.   HENT:      Head: Normocephalic.      Mouth/Throat:      Mouth: Mucous membranes are moist.   Eyes:      Pupils: Pupils are equal, round, and reactive to light.   Cardiovascular:      Rate and Rhythm: Normal rate. Rhythm irregular.      Pulses: Normal pulses.   Pulmonary:      Effort: Pulmonary effort is normal.   Abdominal:      Palpations: Abdomen is soft.   Musculoskeletal:         General: Normal range of motion.      Cervical back: Normal range of motion.   Skin:     General: Skin is warm.   Neurological:      General: No focal deficit present.      Mental Status: She is alert. Mental  status is at baseline.   Psychiatric:         Mood and Affect: Mood normal.              CRANIAL NERVES     CN III, IV, VI   Pupils are equal, round, and reactive to light.       Significant Labs: All pertinent labs within the past 24 hours have been reviewed.  CBC:   Recent Labs   Lab 12/20/23  1156   WBC 4.79   HGB 8.6*   HCT 27.6*        CMP:   Recent Labs   Lab 12/20/23  1156      K 3.2*      CO2 26   GLU 81   BUN 17   CREATININE 4.2*   CALCIUM 7.2*   PROT 5.8*   ALBUMIN 3.1*   BILITOT 0.5   ALKPHOS 50*   AST 21   ALT 12   ANIONGAP 11     Cardiac Markers:   Recent Labs   Lab 12/20/23  1156   *     Magnesium:   Recent Labs   Lab 12/20/23  1156   MG 1.6     Troponin:   Recent Labs   Lab 12/20/23  1156   TROPONINIHS 28.0*       Significant Imaging: I have reviewed all pertinent imaging results/findings within the past 24 hours.  Imaging Results              X-Ray Chest AP Portable (Final result)  Result time 12/20/23 11:49:14      Final result by Aleksey Atkins MD (12/20/23 11:49:14)                   Narrative:    HISTORY: Sepsis    FINDINGS: Portable chest radiograph at 1135 hours compared to prior exams shows the cardiomediastinal silhouette and pulmonary vasculature are within normal limits. There are aortic vascular calcifications.    The lungs are normally expanded, with no consolidation, large pleural effusion, or evidence of pulmonary edema. No confluent infiltrates or pneumothorax. The bones are diffusely osteopenic, with no acute fractures.    IMPRESSION: No evidence of acute cardiopulmonary disease.    Electronically signed by:  Aleksey Atkins MD  12/20/2023 11:49 AM [facility] Workstation: [id number]

## 2023-12-20 NOTE — HPI
83-year-old  female with history of hypertension and ESRD on MWF to the ED on account of hypotension during dialysis.  Patient states that she was at dialysis when she started to feel dizzy, with associated shortness of breath.  She endorses palpitation but denies any chest pain, bilateral leg swelling, orthopnea.  She stated that she had an episode of vomiting, blood pressures and was found to be low in the 50s and was subsequently brought to the ED. fluids a dialysis center she denies any fever, contact with sick persons or recent travels.  On arrival to the ED, patient's blood pressure 59/41, patient was bolused IV fluids, patient was found to be in AFib and Cardiology was called who recommended amiodarone alongside Levophed.  Patient started on Levophed and converted to NSR prior to IV amiodarone GTT.  Patient states that she has a history of AFib and is currently on Eliquis.  On presentation to the ED, hemoglobin 8 0.6/27.6, potassium 3.2 and creatinine 4.2.  BNP 6 3 8 and troponin 28.0.  Review of home records shows that patient is on midodrine 3 times daily.

## 2023-12-20 NOTE — Clinical Note
Diagnosis: Hypotension, unspecified hypotension type [[id number]]   Future Attending Provider: NANCY SOUZA [[id number]]   Admitting Provider:: NANCY SOUZA [[id number]]   Admit to which facility:: [facility] [[id number]]   Reason for IP Medical Treatment  (Clinical interventions that can only be accomplished in the IP setting? ) :: hypotension   I certify that Inpatient services for greater than or equal to 2 midnights are medically necessary:: Yes   Plans for Post-Acute care--if anticipated (pick the single best option):: A. No post acute care anticipated at this time

## 2023-12-20 NOTE — CONSULTS
[facility] - Emergency Dept  Department of Cardiology  Consult Note      PATIENT NAME: Tyra Isaac  MRN: [id number]  TODAY'S DATE: 12/20/2023  ADMIT DATE: 12/20/2023                          CONSULT REQUESTED BY: Tiffany Quarles MD    SUBJECTIVE     PRINCIPAL PROBLEM: <principal problem not specified>      REASON FOR CONSULT:  Afib RVR     Per HPI from admission notes: 83-year-old  female with history of hypertension and ESRD on MWF to the ED on account of hypotension during dialysis.  Patient states that she was at dialysis when she started to feel dizzy, with associated shortness of breath.  She endorses palpitation but denies any chest pain, bilateral leg swelling, orthopnea.  She stated that she had an episode of vomiting, blood pressures and was found to be low in the 50s and was subsequently brought to the ED. fluids a dialysis center she denies any fever, contact with sick persons or recent travels.  On arrival to the ED, patient's blood pressure 59/41, patient was bolused IV fluids, patient was found to be in AFib and Cardiology was called who recommended amiodarone alongside Levophed.  Patient started on Levophed and converted to NSR prior to IV amiodarone GTT.  Patient states that she has a history of AFib and is currently on Eliquis.  On presentation to the ED, hemoglobin 8 0.6/27.6, potassium 3.2 and creatinine 4.2.  BNP 6 3 8 and troponin 28.0.  Review of home records shows that patient is on midodrine 3 times daily.         HPI:    Pt is a 83 year old female with history of bradycardia, hypotension, Afib RVR, and ESRD who gets her dialysis MWF. She states that she went to dialysis today and started to feel extremely weak and faint then proceeded to start vomiting. Her dialysis nurse found her to be tachycardic and hypotensive and called 911. Her pressure was said to be 59/41 and initial HR was in the 150s. Pt was started on Norepinephrine in the ER; that has since  been weaned off. Amiodarone was also ordered but pt converted before receiving medication. She is now still feeling rather weak, but BP is stable at 119/57 and HR is 71. Repeat EKG showed some chronic T wave inversion, but otherwise NSR.         Review of patient's allergies indicates:   Allergen Reactions    Cyclobenzaprine     Fish containing products Hives    Peanut Other (See Comments)    Tramadol Itching       Past Medical History:   Diagnosis Date    A-fib     Anxiety     Depression     Disorder of kidney and ureter     Encephalopathy acute 1/1/2018    End stage kidney disease 6/17/2017    Gout     Hyperlipidemia     Hypertension     Moderate episode of recurrent major depressive disorder 1/17/2018    Nephropathy hypertensive, 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    Osteopenia of multiple sites 3/9/2018    Based upon bone density measurements. Patient also has chronic kidney disease.    Stroke 11/2016     Past Surgical History:   Procedure Laterality Date    ANGIOGRAM, CORONARY, WITH LEFT HEART CATHETERIZATION N/A 2/7/2022    Procedure: Angiogram, Coronary, with Left Heart Cath;  Surgeon: Gino Leal MD;  Location: [facility] CATH/EP LAB;  Service: Cardiology;  Laterality: N/A;    CARDIAC SURGERY      stents    EYE SURGERY      WRIST SURGERY       Social History     Tobacco Use    Smoking status: Never    Smokeless tobacco: Never   Substance Use Topics    Alcohol use: No    Drug use: No        REVIEW OF SYSTEMS    As mentioned in HPI    OBJECTIVE     VITAL SIGNS (Most Recent)  Temp: 98.3 °F (36.8 °C) (12/20/23 1430)  Pulse: 71 (12/20/23 1540)  Resp: 18 (12/20/23 1540)  BP: (!) 119/57 (12/20/23 1540)  SpO2: 100 % (12/20/23 1540)    VENTILATION STATUS  Resp: 18 (12/20/23 1540)  SpO2: 100 % (12/20/23 1540)           I & O (Last 24H):No intake or output data in the 24 hours ending  12/20/23 1547    WEIGHTS  Wt Readings from Last 3 Encounters:   12/20/23 1129 50.3 kg (111 lb)   12/04/23 2220 50.6 kg (111 lb 8.8 oz)   12/04/23 0636 48.1 kg (106 lb)   11/29/23 1040 48.5 kg (107 lb)       PHYSICAL EXAM    CONSTITUTIONAL: NAD  HEENT: Normocephalic. No pallor  NECK: no JVD  LUNGS: CTA b/l  HEART: regular rate and rhythm, S1, S2 normal, +murmur    ABDOMEN: soft, non-tender, bowel sounds normal  EXTREMITIES: No edema  SKIN: No rash  NEURO: AAO X 3  PSYCH: normal affect      HOME MEDICATIONS:  No current facility-administered medications on file prior to encounter.     Current Outpatient Medications on File Prior to Encounter   Medication Sig Dispense Refill    atorvastatin (LIPITOR) 40 MG tablet Take 40 mg by mouth once daily.      b complex vitamins tablet Take 1 tablet by mouth once daily.      dorzolamide-timolol 2-0.5% (COSOPT) 22.3-6.8 mg/mL ophthalmic solution Place 1 drop into both eyes 2 (two) times daily.      ELIQUIS 2.5 mg Tab Take 2.5 mg by mouth 2 (two) times daily.      latanoprost 0.005 % ophthalmic solution Place 1 drop into both eyes every evening.      loperamide (IMODIUM A-D) 2 mg Tab Take 1 tablet (2 mg total) by mouth 4 (four) times daily as needed (diarrhea). 3 tablet 0    midodrine (PROAMATINE) 5 MG Tab Take 1 tablet (5 mg total) by mouth 3 (three) times daily. 90 tablet 0    ondansetron (ZOFRAN) 4 MG tablet Take 1 tablet (4 mg total) by mouth every 8 (eight) hours as needed for Nausea. 15 tablet 0    senna-docusate 8.6-50 mg (PERICOLACE) 8.6-50 mg per tablet Take 1 tablet by mouth 2 (two) times daily as needed for Constipation. 30 tablet 0       SCHEDULED MEDS:   amiodarone  150 mg Intravenous ED 1 Time    dronedarone  400 mg Oral BID WM    [START ON 12/22/2023] epoetin rosy (PROCRIT) injection  50 Units/kg Intravenous Every Mon, Wed, Fri    midodrine  5 mg Oral TID AC       CONTINUOUS INFUSIONS:   amiodarone in dextrose 5%      NORepinephrine bitartrate-D5W Stopped (12/20/23  "4555)       PRN MEDS:    LABS AND DIAGNOSTICS     CBC LAST 3 DAYS  Recent Labs   Lab 12/14/23  0435 12/15/23  0537 12/20/23  1156   WBC 6.82 6.69 4.79   RBC 3.05* 3.27* 2.68*   HGB 9.6* 10.3* 8.6*   HCT 30.6* 33.4* 27.6*   * 102* 103*   MCH 31.5* 31.5* 32.1*   MCHC 31.4* 30.8* 31.2*   RDW 15.9* 15.7* 15.9*    333 232   MPV 9.4 9.6 9.5   GRAN  --   --  52.6  2.5   LYMPH  --   --  21.5  1.0   MONO  --   --  16.1*  0.8   BASO  --   --  0.03   NRBC  --   --  0       COAGULATION LAST 3 DAYS  No results for input(s): "LABPT", "INR", "APTT" in the last 168 hours.    CHEMISTRY LAST 3 DAYS  Recent Labs   Lab 12/14/23  0435 12/15/23  0537 12/20/23  1156   *  --  138   K 4.7  --  3.2*     --  101   CO2 24  --  26   ANIONGAP 8  --  11   BUN 23  --  17   CREATININE 6.1*  --  4.2*   GLU 97  --  81   CALCIUM 8.8  --  7.2*   MG 2.2 2.4 1.6   ALBUMIN 3.2*  --  3.1*   PROT  --   --  5.8*   ALKPHOS  --   --  50*   ALT  --   --  12   AST  --   --  21   BILITOT  --   --  0.5       CARDIAC PROFILE LAST 3 DAYS  Recent Labs   Lab 12/20/23  1156   *   TROPONINIHS 28.0*       ENDOCRINE LAST 3 DAYS  No results for input(s): "TSH", "PROCAL" in the last 168 hours.    LAST ARTERIAL BLOOD GAS  ABG  No results for input(s): "PH", "PO2", "PCO2", "HCO3", "BE" in the last 168 hours.    LAST 7 DAYS MICROBIOLOGY   Microbiology Results (last 7 days)       Procedure Component Value Units Date/Time    Blood culture x two cultures. Draw prior to antibiotics. [[id number]] Collected: 12/20/23 1316    Order Status: Sent Specimen: Blood Updated: 12/20/23 1321    Narrative:      Collection has been rescheduled by [facility] at 12/20/2023 11:40 Reason:   Pt getting xray   Collection has been rescheduled by [facility] at 12/20/2023 11:44 Reason:   Getting midline Started   Collection has been rescheduled by [facility] at 12/20/2023 11:40 Reason:   Pt getting xray   Collection has been rescheduled by [facility] at 12/20/2023 11:44 Reason:   Getting " midline Started     Blood culture x two cultures. Draw prior to antibiotics. [[id number]] Collected: 12/20/23 1217    Order Status: Sent Specimen: Blood Updated: 12/20/23 1237    Narrative:      Collection has been rescheduled by [facility] at 12/20/2023 11:40 Reason:   Pt getting xray   Collection has been rescheduled by [email or web address] at 12/20/2023 11:44 Reason:   Getting midline Started   Collection has been rescheduled by 10 at 12/20/2023 11:40 Reason:   Pt getting xray   Collection has been rescheduled by 10 at 12/20/2023 11:44 Reason:   Getting midline Started             MOST RECENT IMAGING  X-Ray Chest AP Portable  HISTORY: Sepsis    FINDINGS: Portable chest radiograph at 1135 hours compared to prior exams shows the cardiomediastinal silhouette and pulmonary vasculature are within normal limits. There are aortic vascular calcifications.    The lungs are normally expanded, with no consolidation, large pleural effusion, or evidence of pulmonary edema. No confluent infiltrates or pneumothorax. The bones are diffusely osteopenic, with no acute fractures.    IMPRESSION: No evidence of acute cardiopulmonary disease.    Electronically signed by:  Aleksey Atkins MD  12/20/2023 11:49 AM CST Workstation: [id number]      ECHOCARDIOGRAM RESULTS (last 5)  Results for orders placed during the hospital encounter of 03/20/23    Echo    Interpretation Summary  · The left ventricle is normal in size with moderate concentric hypertrophy and normal systolic function.  · The estimated ejection fraction is 57%.  · Normal left ventricular diastolic function.  · Normal right ventricular size with normal right ventricular systolic function.  · Moderate to severe tricuspid regurgitation.  · Mild-to-moderate mitral regurgitation.  · Moderate left atrial enlargement.  · Mild right atrial enlargement.  · There is mild pulmonary hypertension.      Results for orders placed during the hospital encounter of 03/06/23    Echo    Interpretation Summary  ·  The left ventricle is normal in size with normal systolic function.  · The estimated ejection fraction is 60%.  · Grade II left ventricular diastolic dysfunction.  · Normal right ventricular size with normal right ventricular systolic function.  · Moderate left atrial enlargement.  · Mild-to-moderate mitral regurgitation.  · Moderate to severe tricuspid regurgitation.  · There is mild pulmonary hypertension.  · There is mild aortic valve stenosis.  · Aortic valve area is 1.78 cm2; peak velocity is 2.01 m/s; mean gradient is 9 mmHg.      Results for orders placed during the hospital encounter of 01/26/22    Echo    Interpretation Summary  · The left ventricle is normal in size with mild concentric hypertrophy and normal systolic function.  · The estimated ejection fraction is 65%.  · Grade II left ventricular diastolic dysfunction.  · Atrial fibrillation not observed.  · Normal right ventricular size with normal right ventricular systolic function.  · Moderate to severe left atrial enlargement.  · Mild right atrial enlargement.  · Mild mitral regurgitation.  · Mild to moderate tricuspid regurgitation.  · Normal central venous pressure (3 mmHg).  · The estimated PA systolic pressure is 36 mmHg.  · Mildly elevated gradient accross mitral valve of around 6 mmHg at HR of 64 bpm. MVA by pressure half time is normal, however this can be inaccurate.      Results for orders placed during the hospital encounter of 03/03/21    Echo Color Flow Doppler? Yes    Interpretation Summary  · The left ventricle is small with concentric remodeling and normal systolic function. The estimated ejection fraction is 63%  · The estimated PA systolic pressure is 38 mmHg.  · Grade II left ventricular diastolic dysfunction.  · Normal right ventricular size with normal right ventricular systolic function.  · Mild-to-moderate mitral regurgitation.  · Mild to moderate tricuspid regurgitation.  · Normal central venous pressure (3 mmHg).  · The  patient converted from atrial fibrillation to normal sinus rhythm 03/03/2021      Results for orders placed during the hospital encounter of 12/13/19    Echo Color Flow Doppler? Yes    Interpretation Summary  · Mild concentric left ventricular hypertrophy.  · Normal left ventricular systolic function. The estimated ejection fraction is 70%  · Grade II (moderate) left ventricular diastolic dysfunction consistent with pseudonormalization.  · The estimated PA systolic pressure is 37 mm Hg  · No wall motion abnormalities.  · Normal right ventricular systolic function.  · Normal central venous pressure (3 mm Hg).  · Mild mitral sclerosis.  · Mild mitral regurgitation.  · Mild to moderate tricuspid regurgitation.      CURRENT/PREVIOUS VISIT EKG  Results for orders placed or performed during the hospital encounter of 12/20/23   EKG 12-lead    Collection Time: 12/20/23  3:40 PM    Narrative    Test Reason : R53.1,    Vent. Rate : 069 BPM     Atrial Rate : 069 BPM     P-R Int : 152 ms          QRS Dur : 074 ms      QT Int : 474 ms       P-R-T Axes : 083 040 -87 degrees     QTc Int : 507 ms    Sinus rhythm with Premature supraventricular complexes  ST and T wave abnormality, consider inferior ischemia  ST and T wave abnormality, consider anterior ischemia  Prolonged QT  Abnormal ECG  When compared with ECG of 20-DEC-2023 12:43,  Premature ventricular complexes are no longer Present  Premature supraventricular complexes are now Present  Nonspecific T wave abnormality has replaced inverted T waves in Lateral  leads    Referred By: [email or web address]   SELF           Confirmed By:            ASSESSMENT/PLAN:     Active Hospital Problems    Diagnosis    ESRD (end stage renal disease)    Hypotension    Atrial fibrillation       ASSESSMENT & PLAN:     Afib RVR   Hypotension   ESRD - dialysis MWF   Anemia of chronic disease   Anxiety     RECOMMENDATIONS:    Initial EKG was concerning for some ST depression, but most likely from the  tachycardia related to the Afib RVR.  Repeat EKG shows some chronic T wave inversion.   Redraw high sensitivity troponin.   Replace K with 20mEq PO and replace Mag with 2g IV, but continue to monitor kidney function as pt is ESRD.   Will start Multaq 400 mg BID  to help with rhythm control.   DC amiodarone.   Continue midodrine 5mg TID for hypotension.   Monitor pt for tonight and see how she is tolerating medication changes and re-assess tomorrow.     Lizette Villegas NP  Department of Cardiology  Date of Service: 12/20/2023

## 2023-12-20 NOTE — CONSULTS
Consult Note  Nephrology    Consult Requested By: Tiffany Quarles MD    Reason for Consult:  ESRD, dependent on dialysis    SUBJECTIVE:     History of Present Illness:   84 y/o female patient known to our practice, has out patient dialysis 3x wk on MWF schedule.  She did go to her regularly scheduled dialysis treatment this morning.  States that she felt dizzy and weak following treatment, was SOB, and her pressure dropped.  She stated that she had an episode of vomiting, blood pressures and was found to be low in the 50s and was given fluids, then subsequently brought to the ED.  Nephrology is consulted for dialysis orders.    12/20  pt completed her treatment this am, CXR is clear.  Will plan HD on pt's schedule, next due Friday.    Assessment/plan:    ESRD  Hypotension  Hypokalemia  SHPT  Anemia of chronic dz    --HD on MWF.  Dose all medications for dialysis.  Renal diet, fluid restriction  --on pressors  --not unusual for K to be low following dialysis treatment.  This should correct itself.  Will f/u labs in am.  --continue binders w/meals  --epo w/HD as indicated    Past Medical History:   Diagnosis Date    A-fib     Anxiety     Depression     Disorder of kidney and ureter     Encephalopathy acute 1/1/2018    End stage kidney disease 6/17/2017    Gout     Hyperlipidemia     Hypertension     Moderate episode of recurrent major depressive disorder 1/17/2018    Nephropathy hypertensive, 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    Osteopenia of multiple sites 3/9/2018    Based upon bone density measurements. Patient also has chronic kidney disease.    Stroke 11/2016     Past Surgical History:   Procedure Laterality Date    ANGIOGRAM, CORONARY, WITH LEFT HEART CATHETERIZATION N/A 2/7/2022    Procedure: Angiogram, Coronary, with Left Heart Cath;  Surgeon: Gino Leal MD;   Location: [facility] CATH/EP LAB;  Service: Cardiology;  Laterality: N/A;    CARDIAC SURGERY      stents    EYE SURGERY      WRIST SURGERY       Family History   Problem Relation Age of Onset    Heart disease Mother     Cancer Father      Social History     Tobacco Use    Smoking status: Never    Smokeless tobacco: Never   Substance Use Topics    Alcohol use: No    Drug use: No       Review of patient's allergies indicates:   Allergen Reactions    Cyclobenzaprine     Fish containing products Hives    Peanut Other (See Comments)    Tramadol Itching        Review of Systems:  Weakness, fatigue    OBJECTIVE:     Vital Signs Range (Last 24H):  Temp:  [96.1 °F (35.6 °C)-98.3 °F (36.8 °C)]   Pulse:  []   Resp:  [18-49]   BP: ()/(37-75)   SpO2:  [90 %-100 %]     Physical Exam:  General- NAD noted  HEENT- WNL  Neck- supple  CV- Regular rate and rhythm  Resp- Lungs CTA Bilaterally, No increased WOB  GI- Non tender/non-distended, BS normoactive x4 quads  Extrem- No cyanosis, clubbing, edema.  Derm- skin w/d  Neuro-  No flap.     Body mass index is 17.92 kg/m².    Laboratory:  CBC:   Recent Labs   Lab 12/20/23  1156   WBC 4.79   RBC 2.68*   HGB 8.6*   HCT 27.6*      *   MCH 32.1*   MCHC 31.2*     CMP:   Recent Labs   Lab 12/20/23  1156   GLU 81   CALCIUM 7.2*   ALBUMIN 3.1*   PROT 5.8*      K 3.2*   CO2 26      BUN 17   CREATININE 4.2*   ALKPHOS 50*   ALT 12   AST 21   BILITOT 0.5     Recent Labs   Lab 12/20/23  1247   COLORU Yellow   SPECGRAV 1.015   PHUR 6.0   PROTEINUA 2+*   BACTERIA Rare   NITRITE Negative   LEUKOCYTESUR Negative   UROBILINOGEN Negative   HYALINECASTS 8*       Diagnostic Results:  Labs: Reviewed      ASSESSMENT/PLAN:     Active Hospital Problems    Diagnosis  POA    ESRD (end stage renal disease) [N18.6]  Yes    Hypotension [I95.9]  Yes    Atrial fibrillation [I48.91]  Yes      Resolved Hospital Problems   No resolved problems to display.         Thank you for allowing us to  participate in the care of your patient. We will follow the patient and provide recommendations as needed.      Time spent seeing patient( greater than 1/2 spent in direct contact) :     Arpita Salas NP

## 2023-12-20 NOTE — ED PROVIDER NOTES
Encounter Date: 12/20/2023       History     Chief Complaint   Patient presents with    Hypotension     Vomiting     Patient presents complaining of vomiting, weakness, low blood pressure.  Patient received dialysis today and immediately following became very weak and faint then vomited.  Patient had very low blood pressure.  Per EMS report patient had 0.8 L taken off and then was replaced another 1.2 L.  Additionally EMS gave approximately 200 cc.  Upon arrival patient remains weak.  She does have vomit on her clothes.  She complains of mild chest discomfort.  Patient does have a history of atrial fibrillation.      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    Osteopenia of multiple sites 3/9/2018    Based upon bone density measurements. Patient also has chronic kidney disease.    Stroke 11/2016     Past Surgical History:   Procedure Laterality Date    ANGIOGRAM, CORONARY, WITH LEFT HEART CATHETERIZATION N/A 2/7/2022    Procedure: Angiogram, Coronary, with Left Heart Cath;  Surgeon: Gino Leal MD;  Location: [facility] CATH/EP LAB;  Service: Cardiology;  Laterality: N/A;    CARDIAC SURGERY      stents    EYE SURGERY      WRIST SURGERY       Family History   Problem Relation Age of Onset    Heart disease Mother     Cancer Father      Social History     Tobacco Use    Smoking status: Never    Smokeless  tobacco: Never   Substance Use Topics    Alcohol use: No    Drug use: No     Review of Systems   All other systems reviewed and are negative.      Physical Exam     Initial Vitals   BP Pulse Resp Temp SpO2   12/20/23 1129 12/20/23 1200 12/20/23 1129 12/20/23 1250 12/20/23 1200   (!) 59/41 (!) 150 20 96.1 °F (35.6 °C) 99 %      MAP       --                Physical Exam    Nursing note and vitals reviewed.  Constitutional:   Pleasant, polite, weak, frail, vomit on her clothes   HENT:   Head: Normocephalic and atraumatic.   Eyes: EOM are normal.   Neck: Neck supple.   Normal range of motion.  Cardiovascular:            Irregularly irregular, tachycardic   Pulmonary/Chest: Breath sounds normal. No respiratory distress.   Abdominal: Abdomen is soft.   Musculoskeletal:         General: Normal range of motion.      Cervical back: Normal range of motion and neck supple.     Neurological: She is alert and oriented to person, place, and time. She has normal strength.   Skin: Skin is warm and dry. Capillary refill takes less than 2 seconds.   Psychiatric: She has a normal mood and affect. Her behavior is normal. Judgment and thought content normal.         ED Course   Procedures  Labs Reviewed   CBC W/ AUTO DIFFERENTIAL - Abnormal; Notable for the following components:       Result Value    RBC 2.68 (*)     Hemoglobin 8.6 (*)     Hematocrit 27.6 (*)      (*)     MCH 32.1 (*)     MCHC 31.2 (*)     RDW 15.9 (*)     Mono % 16.1 (*)     Eosinophil % 9.0 (*)     All other components within normal limits   COMPREHENSIVE METABOLIC PANEL - Abnormal; Notable for the following components:    Potassium 3.2 (*)     Creatinine 4.2 (*)     Calcium 7.2 (*)     Total Protein 5.8 (*)     Albumin 3.1 (*)     Alkaline Phosphatase 50 (*)     eGFR 10.0 (*)     All other components within normal limits   URINALYSIS, REFLEX TO URINE CULTURE - Abnormal; Notable for the following components:    Protein, UA 2+ (*)     All other components within  normal limits    Narrative:     Specimen Source->Urine   B-TYPE NATRIURETIC PEPTIDE - Abnormal; Notable for the following components:     (*)     All other components within normal limits   TROPONIN I HIGH SENSITIVITY - Abnormal; Notable for the following components:    Troponin I High Sensitivity 28.0 (*)     All other components within normal limits   URINALYSIS MICROSCOPIC - Abnormal; Notable for the following components:    Hyaline Casts, UA 8 (*)     All other components within normal limits    Narrative:     Specimen Source->Urine   CULTURE, BLOOD    Narrative:     Collection has been rescheduled by [facility] at 12/20/2023 11:40 Reason:   Pt getting xray   Collection has been rescheduled by [facility] at 12/20/2023 11:44 Reason:   Getting midline Started   Collection has been rescheduled by [facility] at 12/20/2023 11:40 Reason:   Pt getting xray   Collection has been rescheduled by [facility] at 12/20/2023 11:44 Reason:   Getting midline Started    CULTURE, BLOOD    Narrative:     Collection has been rescheduled by [facility] at 12/20/2023 11:40 Reason:   Pt getting xray   Collection has been rescheduled by [facility] at 12/20/2023 11:44 Reason:   Getting midline Started   Collection has been rescheduled by [facility] at 12/20/2023 11:40 Reason:   Pt getting xray   Collection has been rescheduled by [facility] at 12/20/2023 11:44 Reason:   Getting midline Started    INFLUENZA A AND B ANTIGEN    Narrative:     Specimen Source->Nasopharyngeal Swab   MAGNESIUM   LACTIC ACID, PLASMA   LACTIC ACID, PLASMA          Imaging Results              X-Ray Chest AP Portable (Final result)  Result time 12/20/23 11:49:14      Final result by Aleksey Atkins MD (12/20/23 11:49:14)                   Narrative:    HISTORY: Sepsis    FINDINGS: Portable chest radiograph at 1135 hours compared to prior exams shows the cardiomediastinal silhouette and pulmonary vasculature are within normal limits. There are aortic vascular calcifications.    The lungs are normally  expanded, with no consolidation, large pleural effusion, or evidence of pulmonary edema. No confluent infiltrates or pneumothorax. The bones are diffusely osteopenic, with no acute fractures.    IMPRESSION: No evidence of acute cardiopulmonary disease.    Electronically signed by:  Aleksey Atkins MD  12/20/2023 11:49 AM [facility] Workstation: [id number]                                     Medications   ondansetron injection 4 mg (has no administration in time range)   NORepinephrine 4 mg in dextrose 5% 250 mL infusion (premix) (has no administration in time range)   0.9%  NaCl infusion (has no administration in time range)   NORepinephrine bitartrate-D5W 4 mg/250 mL (16 mcg/mL) infusion Soln (has no administration in time range)   amiodarone injection 150 mg (has no administration in time range)   amiodarone 360 mg/200 mL (1.8 mg/mL) infusion (has no administration in time range)   midodrine tablet 5 mg (has no administration in time range)     Medical Decision Making  Summary       · The left ventricular systolic function was normal.  · The left ventricular end diastolic pressure was elevated.  · The pre-procedure left ventricular end diastolic pressure was 20.  · The post-procedure left ventricular end diastolic pressure was 31.  · The ejection fraction was calculated to be 60%.  · The estimated blood loss was none.  · Left main is patent, lad patent moderate to severe tortuosity.  · Left circumflex artery proximal stent is patent moderate to severe tortuosity, ramus intermedius is patent.  · RCA is small caliber is patent moderate tortuosity. Non dominant vessel.     The procedure log was documented by Documenter: RT Sherita and verified by Gino Leal MD.    Patient with hypotensive event after dialysis.  Patient considerations include hypovolemic shock, infection, electrolyte abnormalities, acute on chronic renal failure, cardiac etiologies    In the emergency department patient was found to be markedly  hypotensive requiring rescue Levophed therapy.  Patient received both Levophed and IV fluids.  Patient's blood pressure steadily improved.  Patient was considered for both amiodarone and Levophed therapy secondary to atrial fibrillation with rapid ventricular rate.  This was in consultation with Dr. Amezquita however before amiodarone drip was started patient has spontaneously converted.  Patient's blood pressure has steadily improved and she is nearly completely weaned off of Levophed.  Review of patient's blood work reveals no evidence of significant electrolyte or metabolic abnormalities.  Patient's high sensitivity troponin is mildly elevated at 28.  Patient has been consulted hospital medicine secondary to hypovolemic shock.  She remains clinically stable.    Attending Critical Care:   Critical Care Times:   Direct Patient Care (initial evaluation, reassessments, and time considering the case)................................................................10 minutes.   Additional History from reviewing old medical records or taking additional history from the family, EMS, PCP, etc.......................10 minutes.   Ordering, Reviewing, and Interpreting Diagnostic Studies...............................................................................................................10 minutes.   Documentation..................................................................................................................................................................................5 minutes.   ==============================================================  · Total Critical Care Time - exclusive of procedural time: 35 minutes.  ==============================================================  Critical care was necessary to treat or prevent imminent or life-threatening deterioration of the following conditions:  Hypovolemic shock.   Critical care was time spent personally by me on the following activities: obtaining  history from patient or relative, examination of patient, review of x-rays / CT sent with the patient, ordering lab, x-rays, and/or EKG, development of treatment plan with patient or relative, ordering and performing treatments and interventions, interpretation of cardiac measurements and re-evaluation of patient's conition.   Critical Care Condition: potentially life-threatening     Amount and/or Complexity of Data Reviewed  Labs: ordered. Decision-making details documented in ED Course.  Radiology: ordered.    Risk  Prescription drug management.               ED Course as of 12/20/23 1343   Wed Dec 20, 2023   1223 WBC: 4.79 [AP]   1223 Hemoglobin(!): 8.6 [AP]   1223 Hematocrit(!): 27.6 [AP]   1223 Platelet Count: 232 [AP]   1227 WBC: 4.79 [AP]   1227 Hemoglobin(!): 8.6 [AP]   1227 Hematocrit(!): 27.6 [AP]   1227 Platelet Count: 232 [AP]   1334 Influenza A, Molecular: Negative [AP]   1335 Magnesium : 1.6 [AP]   1335 Sodium: 138 [AP]   1335 Potassium(!): 3.2 [AP]   1335 Chloride: 101 [AP]   1335 CO2: 26 [AP]   1335 Glucose: 81 [AP]   1335 BUN: 17 [AP]   1335 Creatinine(!): 4.2 [AP]   1335 Calcium(!): 7.2 [AP]   1335 Anion Gap: 11 [AP]   1335 eGFR(!): 10.0 [AP]   1335 ALT: 12 [AP]   1335 AST: 21 [AP]   1335 ALP(!): 50 [AP]   1335 Albumin(!): 3.1 [AP]   1335 PROTEIN TOTAL(!): 5.8 [AP]   1335 Calcium(!): 7.2 [AP]   1335 Troponin I High Sensitivity(!): 28.0 [AP]   1335 BNP(!): 638 [AP]   1335 WBC: 4.79 [AP]   1335 Hemoglobin(!): 8.6 [AP]   1335 Hematocrit(!): 27.6 [AP]   1335 Platelet Count: 232 [AP]      ED Course User Index  [AP] Ramon Bose MD                             Clinical Impression:  Final diagnoses:  [R53.1] Weakness  [I95.9] Hypotension, unspecified hypotension type (Primary)  [R57.1] Hypovolemic shock  [I48.91] Atrial fibrillation with RVR          ED Disposition Condition    Observation                 Ramon Bose MD  12/20/23 0499

## 2023-12-20 NOTE — H&P
[facility] - Emergency Dept  Hospital Medicine  History & Physical    Patient Name: Tyra Isaac  MRN: [id number]  Patient Class: OP- Observation  Admission Date: 12/20/2023  Attending Physician: Tiffany Quarles MD  Primary Care Provider: Kalpesh Goel MD         Patient information was obtained from patient and ER records.     Subjective:     Principal Problem:<principal problem not specified>    Chief Complaint:   Chief Complaint   Patient presents with    Hypotension     Vomiting        HPI: 83-year-old  female with history of hypertension and ESRD on MWF to the ED on account of hypotension during dialysis.  Patient states that she was at dialysis when she started to feel dizzy, with associated shortness of breath.  She endorses palpitation but denies any chest pain, bilateral leg swelling, orthopnea.  She stated that she had an episode of vomiting, blood pressures and was found to be low in the 50s and was subsequently brought to the ED. fluids a dialysis center she denies any fever, contact with sick persons or recent travels.  On arrival to the ED, patient's blood pressure 59/41, patient was bolused IV fluids, patient was found to be in AFib and Cardiology was called who recommended amiodarone alongside Levophed.  Patient started on Levophed and converted to NSR prior to IV amiodarone GTT.  Patient states that she has a history of AFib and is currently on Eliquis.  On presentation to the ED, hemoglobin 8 0.6/27.6, potassium 3.2 and creatinine 4.2.  BNP 6 3 8 and troponin 28.0.  Review of home records shows that patient is on midodrine 3 times daily.    Past Medical History:   Diagnosis Date    A-fib     Anxiety     Depression     Disorder of kidney and ureter     Encephalopathy acute 1/1/2018    End stage kidney disease 6/17/2017    Gout     Hyperlipidemia     Hypertension     Moderate episode of recurrent major depressive disorder 1/17/2018    Nephropathy hypertensive,  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    Osteopenia of multiple sites 3/9/2018    Based upon bone density measurements. Patient also has chronic kidney disease.    Stroke 11/2016       Past Surgical History:   Procedure Laterality Date    ANGIOGRAM, CORONARY, WITH LEFT HEART CATHETERIZATION N/A 2/7/2022    Procedure: Angiogram, Coronary, with Left Heart Cath;  Surgeon: Gino Leal MD;  Location: [facility] CATH/EP LAB;  Service: Cardiology;  Laterality: N/A;    CARDIAC SURGERY      stents    EYE SURGERY      WRIST SURGERY         Review of patient's allergies indicates:   Allergen Reactions    Cyclobenzaprine     Fish containing products Hives    Peanut Other (See Comments)    Tramadol Itching       No current facility-administered medications on file prior to encounter.     Current Outpatient Medications on File Prior to Encounter   Medication Sig    atorvastatin (LIPITOR) 40 MG tablet Take 40 mg by mouth once daily.    b complex vitamins tablet Take 1 tablet by mouth once daily.    dorzolamide-timolol 2-0.5% (COSOPT) 22.3-6.8 mg/mL ophthalmic solution Place 1 drop into both eyes 2 (two) times daily.    ELIQUIS 2.5 mg Tab Take 2.5 mg by mouth 2 (two) times daily.    latanoprost 0.005 % ophthalmic solution Place 1 drop into both eyes every evening.    loperamide (IMODIUM A-D) 2 mg Tab Take 1 tablet (2 mg total) by mouth 4 (four) times daily as needed (diarrhea).    midodrine (PROAMATINE) 5 MG Tab Take 1 tablet (5 mg total) by mouth 3 (three) times daily.    ondansetron (ZOFRAN) 4 MG tablet Take 1 tablet (4 mg total) by mouth every 8 (eight) hours as needed for Nausea.    senna-docusate 8.6-50 mg (PERICOLACE) 8.6-50 mg per tablet Take 1 tablet by mouth 2 (two) times daily as needed for Constipation.     Family History       Problem Relation (Age of Onset)    Cancer Father    Heart  disease Mother          Tobacco Use    Smoking status: Never    Smokeless tobacco: Never   Substance and Sexual Activity    Alcohol use: No    Drug use: No    Sexual activity: Not Currently     Review of Systems   Constitutional:  Positive for fatigue. Negative for fever.   Respiratory:  Positive for shortness of breath. Negative for wheezing.    Cardiovascular:  Positive for palpitations. Negative for chest pain.   Gastrointestinal:  Positive for vomiting.   Neurological:  Positive for weakness.   All other systems reviewed and are negative.    Objective:     Vital Signs (Most Recent):  Temp: 96.1 °F (35.6 °C) (12/20/23 1250)  Pulse: 74 (12/20/23 1240)  Resp: 20 (12/20/23 1240)  BP: (!) 137/58 (12/20/23 1240)  SpO2: 100 % (12/20/23 1240) Vital Signs (24h Range):  Temp:  [96.1 °F (35.6 °C)] 96.1 °F (35.6 °C)  Pulse:  [] 74  Resp:  [20-49] 20  SpO2:  [90 %-100 %] 100 %  BP: ()/(37-58) 137/58     Weight: 50.3 kg (111 lb)  Body mass index is 17.92 kg/m².     Physical Exam  Vitals and nursing note reviewed.   HENT:      Head: Normocephalic.      Mouth/Throat:      Mouth: Mucous membranes are moist.   Eyes:      Pupils: Pupils are equal, round, and reactive to light.   Cardiovascular:      Rate and Rhythm: Normal rate. Rhythm irregular.      Pulses: Normal pulses.   Pulmonary:      Effort: Pulmonary effort is normal.   Abdominal:      Palpations: Abdomen is soft.   Musculoskeletal:         General: Normal range of motion.      Cervical back: Normal range of motion.   Skin:     General: Skin is warm.   Neurological:      General: No focal deficit present.      Mental Status: She is alert. Mental status is at baseline.   Psychiatric:         Mood and Affect: Mood normal.              CRANIAL NERVES     CN III, IV, VI   Pupils are equal, round, and reactive to light.       Significant Labs: All pertinent labs within the past 24 hours have been reviewed.  CBC:   Recent Labs   Lab 12/20/23  1156   WBC 4.79   HGB  8.6*   HCT 27.6*        CMP:   Recent Labs   Lab 12/20/23  1156      K 3.2*      CO2 26   GLU 81   BUN 17   CREATININE 4.2*   CALCIUM 7.2*   PROT 5.8*   ALBUMIN 3.1*   BILITOT 0.5   ALKPHOS 50*   AST 21   ALT 12   ANIONGAP 11     Cardiac Markers:   Recent Labs   Lab 12/20/23  1156   *     Magnesium:   Recent Labs   Lab 12/20/23  1156   MG 1.6     Troponin:   Recent Labs   Lab 12/20/23  1156   TROPONINIHS 28.0*       Significant Imaging: I have reviewed all pertinent imaging results/findings within the past 24 hours.  Imaging Results              X-Ray Chest AP Portable (Final result)  Result time 12/20/23 11:49:14      Final result by Aleksey Atkins MD (12/20/23 11:49:14)                   Narrative:    HISTORY: Sepsis    FINDINGS: Portable chest radiograph at 1135 hours compared to prior exams shows the cardiomediastinal silhouette and pulmonary vasculature are within normal limits. There are aortic vascular calcifications.    The lungs are normally expanded, with no consolidation, large pleural effusion, or evidence of pulmonary edema. No confluent infiltrates or pneumothorax. The bones are diffusely osteopenic, with no acute fractures.    IMPRESSION: No evidence of acute cardiopulmonary disease.    Electronically signed by:  Aleksey Atkisn MD  12/20/2023 11:49 AM [facility] Workstation: [id number]                                    Assessment/Plan:     Hypotension  Patient bolused fluids in the ED  Currently on IV Levophed GTT  Resume home medication of midodrine  Wean as tolerated  Cardiac telemetry      Atrial fibrillation  Patient with Paroxysmal (<7 days) atrial fibrillation which is controlled currently with Amiodarone. Patient is currently in sinus rhythm.[id number] Score: 4. HASBLED Score: . Anticoagulation indicated. Anticoagulation done with Eliquis .  Patient already converted, IV amiodarone currently on hold  Resume home medications  Cardiology consulted    ESRD (end stage renal  disease)  Creatine stable for now. BMP reviewed- noted Estimated Creatinine Clearance: 8.1 mL/min (A) (based on SCr of 4.2 mg/dL (H)). according to latest data. Based on current GFR, CKD stage is end stage.  Monitor UOP and serial BMP and adjust therapy as needed. Renally dose meds. Avoid nephrotoxic medications and procedures.  Patient was on dialysis during episode  Consult Nephrology      VTE Risk Mitigation (From admission, onward)      None               On 12/20/2023, patient should be placed in hospital observation services under my care.             Tiffany Quarles MD  Department of Hospital Medicine  [facility] - Emergency Dept

## 2023-12-21 VITALS
HEIGHT: 66 IN | WEIGHT: 106.94 LBS | RESPIRATION RATE: 17 BRPM | HEART RATE: 60 BPM | BODY MASS INDEX: 17.19 KG/M2 | SYSTOLIC BLOOD PRESSURE: 112 MMHG | TEMPERATURE: 99 F | OXYGEN SATURATION: 98 % | DIASTOLIC BLOOD PRESSURE: 41 MMHG

## 2023-12-21 LAB
ALLENS TEST: ABNORMAL
ANION GAP SERPL CALC-SCNC: 9 MMOL/L (ref 8–16)
BUN SERPL-MCNC: 24 MG/DL (ref 8–23)
CALCIUM SERPL-MCNC: 7.6 MG/DL (ref 8.7–10.5)
CHLORIDE SERPL-SCNC: 105 MMOL/L (ref 95–110)
CO2 SERPL-SCNC: 26 MMOL/L (ref 23–29)
CORTIS SERPL-MCNC: 8.8 UG/DL
CREAT SERPL-MCNC: 5.4 MG/DL (ref 0.5–1.4)
DELSYS: ABNORMAL
ERYTHROCYTE [DISTWIDTH] IN BLOOD BY AUTOMATED COUNT: 15.9 % (ref 11.5–14.5)
EST. GFR  (NO RACE VARIABLE): 7.4 ML/MIN/1.73 M^2
FIO2: 21
GLUCOSE SERPL-MCNC: 103 MG/DL (ref 70–110)
GLUCOSE SERPL-MCNC: 64 MG/DL (ref 70–110)
HCO3 UR-SCNC: 22.6 MMOL/L (ref 24–28)
HCT VFR BLD AUTO: 26 % (ref 37–48.5)
HCT VFR BLD CALC: 27 %PCV (ref 36–54)
HGB BLD-MCNC: 8 G/DL (ref 12–16)
MAGNESIUM SERPL-MCNC: 2.5 MG/DL (ref 1.6–2.6)
MCH RBC QN AUTO: 31.6 PG (ref 27–31)
MCHC RBC AUTO-ENTMCNC: 30.8 G/DL (ref 32–36)
MCV RBC AUTO: 103 FL (ref 82–98)
MODE: ABNORMAL
PCO2 BLDA: 37.3 MMHG (ref 35–45)
PH SMN: 7.39 [PH] (ref 7.35–7.45)
PLATELET # BLD AUTO: 226 K/UL (ref 150–450)
PMV BLD AUTO: 9.8 FL (ref 9.2–12.9)
PO2 BLDA: 99 MMHG (ref 80–100)
POC BE: -2 MMOL/L
POC IONIZED CALCIUM: 0.99 MMOL/L (ref 1.06–1.42)
POC SATURATED O2: 98 % (ref 95–100)
POC TCO2: 24 MMOL/L (ref 23–27)
POTASSIUM BLD-SCNC: 3.3 MMOL/L (ref 3.5–5.1)
POTASSIUM SERPL-SCNC: 4.6 MMOL/L (ref 3.5–5.1)
RBC # BLD AUTO: 2.53 M/UL (ref 4–5.4)
SAMPLE: ABNORMAL
SITE: ABNORMAL
SODIUM BLD-SCNC: 139 MMOL/L (ref 136–145)
SODIUM SERPL-SCNC: 140 MMOL/L (ref 136–145)
WBC # BLD AUTO: 5.38 K/UL (ref 3.9–12.7)

## 2023-12-21 PROCEDURE — 93005 ELECTROCARDIOGRAM TRACING: CPT | Performed by: GENERAL PRACTICE

## 2023-12-21 PROCEDURE — 83735 ASSAY OF MAGNESIUM: CPT | Performed by: STUDENT IN AN ORGANIZED HEALTH CARE EDUCATION/TRAINING PROGRAM

## 2023-12-21 PROCEDURE — 25000003 PHARM REV CODE 250: Performed by: NURSE PRACTITIONER

## 2023-12-21 PROCEDURE — 85027 COMPLETE CBC AUTOMATED: CPT | Performed by: STUDENT IN AN ORGANIZED HEALTH CARE EDUCATION/TRAINING PROGRAM

## 2023-12-21 PROCEDURE — 94761 N-INVAS EAR/PLS OXIMETRY MLT: CPT

## 2023-12-21 PROCEDURE — 25000003 PHARM REV CODE 250: Performed by: STUDENT IN AN ORGANIZED HEALTH CARE EDUCATION/TRAINING PROGRAM

## 2023-12-21 PROCEDURE — 25000003 PHARM REV CODE 250: Performed by: INTERNAL MEDICINE

## 2023-12-21 PROCEDURE — 36415 COLL VENOUS BLD VENIPUNCTURE: CPT | Performed by: INTERNAL MEDICINE

## 2023-12-21 PROCEDURE — 93010 ELECTROCARDIOGRAM REPORT: CPT | Mod: ,,, | Performed by: GENERAL PRACTICE

## 2023-12-21 PROCEDURE — 80048 BASIC METABOLIC PNL TOTAL CA: CPT | Performed by: STUDENT IN AN ORGANIZED HEALTH CARE EDUCATION/TRAINING PROGRAM

## 2023-12-21 PROCEDURE — 99900031 HC PATIENT EDUCATION (STAT)

## 2023-12-21 PROCEDURE — 82533 TOTAL CORTISOL: CPT | Performed by: INTERNAL MEDICINE

## 2023-12-21 RX ORDER — MIDODRINE HYDROCHLORIDE 10 MG/1
10 TABLET ORAL
Status: DISCONTINUED | OUTPATIENT
Start: 2023-12-21 | End: 2023-12-21 | Stop reason: HOSPADM

## 2023-12-21 RX ORDER — MIDODRINE HYDROCHLORIDE 10 MG/1
10 TABLET ORAL
Qty: 270 TABLET | Refills: 0 | OUTPATIENT
Start: 2023-12-21 | End: 2024-06-06

## 2023-12-21 RX ORDER — MIDODRINE HYDROCHLORIDE 2.5 MG/1
5 TABLET ORAL ONCE
Status: COMPLETED | OUTPATIENT
Start: 2023-12-21 | End: 2023-12-21

## 2023-12-21 RX ADMIN — MIDODRINE HYDROCHLORIDE 5 MG: 2.5 TABLET ORAL at 01:12

## 2023-12-21 RX ADMIN — MIDODRINE HYDROCHLORIDE 5 MG: 2.5 TABLET ORAL at 05:12

## 2023-12-21 RX ADMIN — MIDODRINE HYDROCHLORIDE 10 MG: 10 TABLET ORAL at 11:12

## 2023-12-21 RX ADMIN — APIXABAN 2.5 MG: 2.5 TABLET, FILM COATED ORAL at 09:12

## 2023-12-21 RX ADMIN — DRONEDARONE 400 MG: 400 TABLET, FILM COATED ORAL at 09:12

## 2023-12-21 RX ADMIN — MUPIROCIN: 20 OINTMENT TOPICAL at 09:12

## 2023-12-21 RX ADMIN — DORZOLAMIDE HYDROCHLORIDE AND TIMOLOL MALEATE 1 DROP: 22.3; 6.8 SOLUTION/ DROPS OPHTHALMIC at 09:12

## 2023-12-21 NOTE — PROGRESS NOTES
Pt hypotensive multiple times throughout the night. Placed in trendelenburg. Dr Calix notified. Ordered 5mg midodrine.      12/21/23 0000 12/21/23 0100 12/21/23 0112   Vital Signs   BP (!) 96/40 (!) 90/50 (!) 92/40      12/21/23 0114   Vital Signs   BP (!) 82/37  (53)

## 2023-12-21 NOTE — PLAN OF CARE
Pt cleared for dc from case management to [facility]  Nurse can call report to [phone], ask for nurse for room A20 and SNF will pickup pt.  Kasey RN notified.     12/21/23 0680   Final Note   Assessment Type Final Discharge Note   Anticipated Discharge Disposition SNF   Post-Acute Status   Post-Acute Authorization Placement   Post-Acute Placement Status Set-up Complete/Auth obtained

## 2023-12-21 NOTE — DISCHARGE INSTRUCTIONS
[facility]  Facility Transfer Orders        Admit to: snf    Diagnoses:   Active Hospital Problems    Diagnosis  POA    *Hypotension [I95.9]  Yes     Priority: 1 - High    Atrial fibrillation [I48.91]  Yes     Priority: 2     ESRD (end stage renal disease) [N18.6]  Yes      Resolved Hospital Problems   No resolved problems to display.     Allergies:   Review of patient's allergies indicates:   Allergen Reactions    Cyclobenzaprine     Fish containing products Hives    Peanut Other (See Comments)    Tramadol Itching       Code Status: full    Vitals: Routine       Diet: renal diet    Activity: Activity as tolerated    Nursing Precautions: Aspiration , Fall, Seizure, and Pressure ulcer prevention    Bed/Surface: Pressure Redistribution    Consults: PT to evaluate and treat- 3 times a week, OT to evaluate and treat- 3 times a week, and Nutrition to evaluate and recommend diet    Oxygen: room air    Dialysis: Patient is on dialysis. [facility]    Labs:                   Pending Diagnostic Studies:       Procedure Component Value Units Date/Time    Lactic acid, plasma #1 [[id number]]     Order Status: Sent Lab Status: No result     Specimen: Blood           Imaging:     Miscellaneous Care:       IV Access:      Medications: Discontinue all previous medication orders, if any. See new list below.  Current Discharge Medication List        START taking these medications    Details   dronedarone (MULTAQ) 400 mg Tab Take 1 tablet (400 mg total) by mouth 2 (two) times daily with meals.  Qty: 180 tablet, Refills: 0           CONTINUE these medications which have CHANGED    Details   midodrine (PROAMATINE) 10 MG tablet Take 1 tablet (10 mg total) by mouth 3 (three) times daily with meals.  Qty: 270 tablet, Refills: 0           CONTINUE these medications which have NOT CHANGED    Details   atorvastatin (LIPITOR) 40 MG tablet Take 40 mg by mouth once daily.      b complex vitamins tablet Take 1 tablet by mouth once daily.       dorzolamide-timolol 2-0.5% (COSOPT) 22.3-6.8 mg/mL ophthalmic solution Place 1 drop into both eyes 2 (two) times daily.      ELIQUIS 2.5 mg Tab Take 2.5 mg by mouth 2 (two) times daily.      latanoprost 0.005 % ophthalmic solution Place 1 drop into both eyes every evening.      loperamide (IMODIUM A-D) 2 mg Tab Take 1 tablet (2 mg total) by mouth 4 (four) times daily as needed (diarrhea).  Qty: 3 tablet, Refills: 0      ondansetron (ZOFRAN) 4 MG tablet Take 1 tablet (4 mg total) by mouth every 8 (eight) hours as needed for Nausea.  Qty: 15 tablet, Refills: 0      senna-docusate 8.6-50 mg (PERICOLACE) 8.6-50 mg per tablet Take 1 tablet by mouth 2 (two) times daily as needed for Constipation.  Qty: 30 tablet, Refills: 0           Follow up:    Follow-up Information       Kalpesh Goel MD Follow up in 1 week(s).    Specialty: Internal Medicine  Contact information:  [address] [facility]  [address] [place] [id number]  [phone]                               Immunizations Administered as of 12/21/2023       Name Date Dose VIS Date Route Exp Date    COVID-19, MRNA, LN-S, PF (Moderna) 1/14/2022 -- -- -- --    COVID-19, MRNA, LN-S, PF (Moderna) 3/9/2021 -- -- -- --    COVID-19, MRNA, LN-S, PF (Moderna) 2/5/2021 -- -- -- --    COVID-19, MRNA, LN-S, PF (Moderna) 1/1/2021 -- -- -- --    Lot: UNK             This patient has had both covid vaccinations    Some patients may experience side effects after vaccination.  These may include fever, headache, muscle or joint aches.  Most symptoms resolve with 24-48 hours and do not require urgent medical evaluation unless they persist for more than 72 hours or symptoms are concerning for an unrelated medical condition.          Tiffany Quarles MD

## 2023-12-21 NOTE — CARE UPDATE
12/21/23 0732   Patient Assessment/Suction   Level of Consciousness (AVPU) alert   Respiratory Effort Normal;Unlabored   PRE-TX-O2   Device (Oxygen Therapy) room air   SpO2 99 %   Pulse Oximetry Type Intermittent   $ Pulse Oximetry - Multiple Charge Pulse Oximetry - Multiple   Education   $ Education 15 min;Other (see comment)

## 2023-12-21 NOTE — CONSULTS
Consult Note  Nephrology    Consult Requested By: Tiffany Quarles MD    Reason for Consult:  ESRD, dependent on dialysis    SUBJECTIVE:     History of Present Illness:   84 y/o female patient known to our practice, has out patient dialysis 3x wk on MWF schedule.  She did go to her regularly scheduled dialysis treatment this morning.  States that she felt dizzy and weak following treatment, was SOB, and her pressure dropped.  She stated that she had an episode of vomiting, blood pressures and was found to be low in the 50s and was given fluids, then subsequently brought to the ED.  Nephrology is consulted for dialysis orders.    12/20  pt completed her treatment this am, CXR is clear.  Will plan HD on pt's schedule, next due Friday.    Assessment/plan:    ESRD  Hypotension  Hypokalemia  SHPT  Anemia of chronic dz    --HD on MWF.  Dose all medications for dialysis.  Renal diet, fluid restriction  --on pressors  --not unusual for K to be low following dialysis treatment.  This should correct itself.  Will f/u labs in am.  --continue binders w/meals  --epo w/HD as indicated  --judicious UF  --midodrine to 10mg tid    Past Medical History:   Diagnosis Date    A-fib     Anxiety     Depression     Disorder of kidney and ureter     Encephalopathy acute 1/1/2018    End stage kidney disease 6/17/2017    Gout     Hyperlipidemia     Hypertension     Moderate episode of recurrent major depressive disorder 1/17/2018    Nephropathy hypertensive, 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    Osteopenia of multiple sites 3/9/2018    Based upon bone density measurements. Patient also has chronic kidney disease.    Stroke 11/2016     Past Surgical History:   Procedure Laterality Date    ANGIOGRAM, CORONARY, WITH LEFT HEART CATHETERIZATION N/A 2/7/2022    Procedure: Angiogram, Coronary, with Left Heart Cath;   Surgeon: Gino Leal MD;  Location: [facility] CATH/EP LAB;  Service: Cardiology;  Laterality: N/A;    CARDIAC SURGERY      stents    EYE SURGERY      WRIST SURGERY       Family History   Problem Relation Age of Onset    Heart disease Mother     Cancer Father      Social History     Tobacco Use    Smoking status: Never    Smokeless tobacco: Never   Substance Use Topics    Alcohol use: No    Drug use: No       Review of patient's allergies indicates:   Allergen Reactions    Cyclobenzaprine     Fish containing products Hives    Peanut Other (See Comments)    Tramadol Itching        Review of Systems:  Weakness, fatigue    OBJECTIVE:     Vital Signs Range (Last 24H):  Temp:  [96.1 °F (35.6 °C)-99.2 °F (37.3 °C)]   Pulse:  []   Resp:  [16-49]   BP: ()/(37-75)   SpO2:  [90 %-100 %]     Physical Exam:  General- NAD noted  HEENT- WNL  Neck- supple  CV- Regular rate and rhythm  Resp- Lungs CTA Bilaterally, No increased WOB  GI- Non tender/non-distended, BS normoactive x4 quads  Extrem- No cyanosis, clubbing, edema.  Derm- skin w/d  Neuro-  No flap.     Body mass index is 17.26 kg/m².    Laboratory:  CBC:   Recent Labs   Lab 12/21/23  0330   WBC 5.38   RBC 2.53*   HGB 8.0*   HCT 26.0*      *   MCH 31.6*   MCHC 30.8*       CMP:   Recent Labs   Lab 12/20/23  1156 12/21/23  0329   GLU 81 64*   CALCIUM 7.2* 7.6*   ALBUMIN 3.1*  --    PROT 5.8*  --     140   K 3.2* 4.6   CO2 26 26    105   BUN 17 24*   CREATININE 4.2* 5.4*   ALKPHOS 50*  --    ALT 12  --    AST 21  --    BILITOT 0.5  --        Recent Labs   Lab 12/20/23  1247   COLORU Yellow   SPECGRAV 1.015   PHUR 6.0   PROTEINUA 2+*   BACTERIA Rare   NITRITE Negative   LEUKOCYTESUR Negative   UROBILINOGEN Negative   HYALINECASTS 8*         Diagnostic Results:  Labs: Reviewed      ASSESSMENT/PLAN:     Active Hospital Problems    Diagnosis  POA    ESRD (end stage renal disease) [N18.6]  Yes    Hypotension [I95.9]  Yes    Atrial fibrillation  [I48.91]  Yes      Resolved Hospital Problems   No resolved problems to display.       Imaging Results              X-Ray Chest AP Portable (Final result)  Result time 12/20/23 11:49:14      Final result by Aleksey Atkins MD (12/20/23 11:49:14)                   Narrative:    HISTORY: Sepsis    FINDINGS: Portable chest radiograph at 1135 hours compared to prior exams shows the cardiomediastinal silhouette and pulmonary vasculature are within normal limits. There are aortic vascular calcifications.    The lungs are normally expanded, with no consolidation, large pleural effusion, or evidence of pulmonary edema. No confluent infiltrates or pneumothorax. The bones are diffusely osteopenic, with no acute fractures.    IMPRESSION: No evidence of acute cardiopulmonary disease.    Electronically signed by:  Aleksey Atkins MD  12/20/2023 11:49 AM [facility] Workstation: [id number]                                  Patient care was time spent personally by me on the following activities:   Obtaining a history  Examination of patient.  Providing medical care at the patients bedside.  Developing a treatment plan with patient or surrogate and bedside caregivers  Ordering and reviewing laboratory studies, radiographic studies, pulse oximetry.  Ordering and performing treatments and interventions.  Evaluation of patient's response to treatment.  Discussions with consultants while on the unit and immediately available to the patient.  Re-evaluation of the patient's condition.  Documentation in the medical record.     Thank you for allowing us to participate in the care of your patient. We will follow the patient and provide recommendations as needed.      Time spent seeing patient( greater than 1/2 spent in direct contact) :     Jimenez Valero MD

## 2023-12-21 NOTE — PLAN OF CARE
[facility]  Initial Discharge Assessment       Primary Care Provider: Kalpesh Goel MD    Admission Diagnosis: Hypotension, unspecified hypotension type [I95.9]    Admission Date: 12/20/2023  Expected Discharge Date: 12/22/2023    Met with pt to complete discharge assessment, daughterBecky in room [phone], verified PCP, pharmacy and information on facesheet.  Pt current at [facility].   Pt goes to dialysis at [facility] MWF 530am.  Plan to return to SNF upon discharge.    NOTE:  DME listed below are at pt's home.    Transition of Care Barriers: None    Payor: [email or web address] MANAGED MEDICARE / Plan: [employer] MEDICARE HMO / Product Type: Capitation /     Extended Emergency Contact Information  Primary Emergency Contact: Raffi Isaac  Address: [address] [place] [address] [id number] [place] [address]  Home Phone: [phone]  Mobile Phone: [phone]  Relation: Son   needed? No  Secondary Emergency Contact: Marcelo Isaac  Mobile Phone: [phone]  Relation: Son     12/21/23 1340   Discharge Assessment   Assessment Type Discharge Planning Assessment   Confirmed/corrected address, phone number and insurance Yes   Confirmed Demographics Correct on Facesheet   Source of Information patient;family   Communicated ILAN with patient/caregiver No   People in Home facility resident   Do you expect to return to your current living situation? Yes   Prior to hospitilization cognitive status: Alert/Oriented   Current cognitive status: Alert/Oriented   Walking or Climbing Stairs Difficulty yes   Walking or Climbing Stairs ambulation difficulty, requires equipment   Mobility Management rollator   Dressing/Bathing Difficulty no   Equipment Currently Used at Home rollator;shower chair;bedside commode;grab bar   Readmission within 30 days? No   Patient currently being followed by outpatient case management? No   Do you take prescription medications? Yes   Do you have  prescription coverage? Yes   Coverage [employer]   Do you have any problems affording any of your prescribed medications? No   Is the patient taking medications as prescribed? yes   How do you get to doctors appointments? family or friend will provide   Are you on dialysis? Yes   Dialysis Name and Scheduled days [facility] 530am   Discharge Plan A Skilled Nursing Facility   Discharge Plan B Skilled Nursing Facility   DME Needed Upon Discharge  none   Discharge Plan discussed with: Adult children;Patient   Transition of Care Barriers None

## 2023-12-21 NOTE — PROGRESS NOTES
[facility] - Emergency Dept  Department of Cardiology  Consult Note      PATIENT NAME: Tyra Isaac  MRN: [id number]  TODAY'S DATE: 12/21/2023  ADMIT DATE: 12/20/2023                          CONSULT REQUESTED BY: Tiffany Quarles MD    SUBJECTIVE     PRINCIPAL PROBLEM: <principal problem not specified>      12/21/2023    Patient hr and bp stable over night.   Denies CP or SOB.      REASON FOR CONSULT:  Afib RVR     Per HPI from admission notes: 83-year-old  female with history of hypertension and ESRD on MWF to the ED on account of hypotension during dialysis.  Patient states that she was at dialysis when she started to feel dizzy, with associated shortness of breath.  She endorses palpitation but denies any chest pain, bilateral leg swelling, orthopnea.  She stated that she had an episode of vomiting, blood pressures and was found to be low in the 50s and was subsequently brought to the ED. fluids a dialysis center she denies any fever, contact with sick persons or recent travels.  On arrival to the ED, patient's blood pressure 59/41, patient was bolused IV fluids, patient was found to be in AFib and Cardiology was called who recommended amiodarone alongside Levophed.  Patient started on Levophed and converted to NSR prior to IV amiodarone GTT.  Patient states that she has a history of AFib and is currently on Eliquis.  On presentation to the ED, hemoglobin 8 0.6/27.6, potassium 3.2 and creatinine 4.2.  BNP 6 3 8 and troponin 28.0.  Review of home records shows that patient is on midodrine 3 times daily.         HPI:    Pt is a 83 year old female with history of bradycardia, hypotension, Afib RVR, and ESRD who gets her dialysis MWF. She states that she went to dialysis today and started to feel extremely weak and faint then proceeded to start vomiting. Her dialysis nurse found her to be tachycardic and hypotensive and called 911. Her pressure was said to be 59/41 and initial HR  was in the 150s. Pt was started on Norepinephrine in the ER; that has since been weaned off. Amiodarone was also ordered but pt converted before receiving medication. She is now still feeling rather weak, but BP is stable at 119/57 and HR is 71. Repeat EKG showed some chronic T wave inversion, but otherwise NSR.         Review of patient's allergies indicates:   Allergen Reactions    Cyclobenzaprine     Fish containing products Hives    Peanut Other (See Comments)    Tramadol Itching       Past Medical History:   Diagnosis Date    A-fib     Anxiety     Depression     Disorder of kidney and ureter     Encephalopathy acute 1/1/2018    End stage kidney disease 6/17/2017    Gout     Hyperlipidemia     Hypertension     Moderate episode of recurrent major depressive disorder 1/17/2018    Nephropathy hypertensive, 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    Osteopenia of multiple sites 3/9/2018    Based upon bone density measurements. Patient also has chronic kidney disease.    Stroke 11/2016     Past Surgical History:   Procedure Laterality Date    ANGIOGRAM, CORONARY, WITH LEFT HEART CATHETERIZATION N/A 2/7/2022    Procedure: Angiogram, Coronary, with Left Heart Cath;  Surgeon: Gino Leal MD;  Location[facility] CATH/EP LAB;  Service: Cardiology;  Laterality: N/A;    CARDIAC SURGERY      stents    EYE SURGERY      WRIST SURGERY       Social History     Tobacco Use    Smoking status: Never    Smokeless tobacco: Never   Substance Use Topics    Alcohol use: No    Drug use: No        REVIEW OF SYSTEMS    As mentioned in HPI    OBJECTIVE     VITAL SIGNS (Most Recent)  Temp: 98.6 °F (37 °C) (12/21/23 1100)  Pulse: 60 (12/21/23 1100)  Resp: 17 (12/21/23 1100)  BP: (!) 98/42 (12/21/23 1100)  SpO2: 98 % (12/21/23 1100)    VENTILATION STATUS  Resp: 17 (12/21/23 1100)  SpO2: 98 % (12/21/23 1100)            I & O (Last 24H):  Intake/Output Summary (Last 24 hours) at 12/21/2023 1410  Last data filed at 12/21/2023 0933  Gross per 24 hour   Intake 220 ml   Output 0 ml   Net 220 ml       WEIGHTS  Wt Readings from Last 3 Encounters:   12/20/23 1936 48.5 kg (106 lb 14.8 oz)   12/20/23 1129 50.3 kg (111 lb)   12/04/23 2220 50.6 kg (111 lb 8.8 oz)   12/04/23 0636 48.1 kg (106 lb)   11/29/23 1040 48.5 kg (107 lb)       PHYSICAL EXAM    CONSTITUTIONAL: NAD  HEENT: Normocephalic. No pallor  NECK: no JVD  LUNGS: CTA b/l  HEART: regular rate and rhythm, S1, S2 normal, +murmur    ABDOMEN: soft, non-tender, bowel sounds normal  EXTREMITIES: No edema  SKIN: No rash  NEURO: AAO X 3  PSYCH: normal affect      HOME MEDICATIONS:  No current facility-administered medications on file prior to encounter.     Current Outpatient Medications on File Prior to Encounter   Medication Sig Dispense Refill    atorvastatin (LIPITOR) 40 MG tablet Take 40 mg by mouth once daily.      b complex vitamins tablet Take 1 tablet by mouth once daily.      dorzolamide-timolol 2-0.5% (COSOPT) 22.3-6.8 mg/mL ophthalmic solution Place 1 drop into both eyes 2 (two) times daily.      ELIQUIS 2.5 mg Tab Take 2.5 mg by mouth 2 (two) times daily.      latanoprost 0.005 % ophthalmic solution Place 1 drop into both eyes every evening.      loperamide (IMODIUM A-D) 2 mg Tab Take 1 tablet (2 mg total) by mouth 4 (four) times daily as needed (diarrhea). 3 tablet 0    midodrine (PROAMATINE) 5 MG Tab Take 1 tablet (5 mg total) by mouth 3 (three) times daily. 90 tablet 0    ondansetron (ZOFRAN) 4 MG tablet Take 1 tablet (4 mg total) by mouth every 8 (eight) hours as needed for Nausea. 15 tablet 0    senna-docusate 8.6-50 mg (PERICOLACE) 8.6-50 mg per tablet Take 1 tablet by mouth 2 (two) times daily as needed for Constipation. 30 tablet 0       SCHEDULED MEDS:   apixaban  2.5 mg Oral BID    atorvastatin  40 mg Oral QHS    dorzolamide-timolol 2-0.5%  1 drop Both Eyes  "BID    dronedarone  400 mg Oral BID WM    [START ON 12/22/2023] epoetin rosy (PROCRIT) injection  50 Units/kg Intravenous Every Mon, Wed, Fri    latanoprost  1 drop Both Eyes QHS    midodrine  10 mg Oral TID AC    mupirocin   Nasal BID       CONTINUOUS INFUSIONS:        PRN MEDS:    LABS AND DIAGNOSTICS     CBC LAST 3 DAYS  Recent Labs   Lab 12/15/23  0537 12/20/23  1156 12/20/23  1207 12/21/23  0330   WBC 6.69 4.79  --  5.38   RBC 3.27* 2.68*  --  2.53*   HGB 10.3* 8.6*  --  8.0*   HCT 33.4* 27.6* 27* 26.0*   * 103*  --  103*   MCH 31.5* 32.1*  --  31.6*   MCHC 30.8* 31.2*  --  30.8*   RDW 15.7* 15.9*  --  15.9*    232  --  226   MPV 9.6 9.5  --  9.8   GRAN  --  52.6  2.5  --   --    LYMPH  --  21.5  1.0  --   --    MONO  --  16.1*  0.8  --   --    BASO  --  0.03  --   --    NRBC  --  0  --   --        COAGULATION LAST 3 DAYS  No results for input(s): "LABPT", "INR", "APTT" in the last 168 hours.    CHEMISTRY LAST 3 DAYS  Recent Labs   Lab 12/15/23  0537 12/20/23  1156 12/20/23  1207 12/21/23  0329   NA  --  138  --  140   K  --  3.2*  --  4.6   CL  --  101  --  105   CO2  --  26  --  26   ANIONGAP  --  11  --  9   BUN  --  17  --  24*   CREATININE  --  4.2*  --  5.4*   GLU  --  81  --  64*   CALCIUM  --  7.2*  --  7.6*   PH  --   --  7.390  --    MG 2.4 1.6  --  2.5   ALBUMIN  --  3.1*  --   --    PROT  --  5.8*  --   --    ALKPHOS  --  50*  --   --    ALT  --  12  --   --    AST  --  21  --   --    BILITOT  --  0.5  --   --        CARDIAC PROFILE LAST 3 DAYS  Recent Labs   Lab 12/20/23  1156   *   TROPONINIHS 28.0*       ENDOCRINE LAST 3 DAYS  No results for input(s): "TSH", "PROCAL" in the last 168 hours.    LAST ARTERIAL BLOOD GAS  ABG  Recent Labs   Lab 12/20/23  1207   PH 7.390   PO2 99   PCO2 37.3   HCO3 22.6*   BE -2       LAST 7 DAYS MICROBIOLOGY   Microbiology Results (last 7 days)       Procedure Component Value Units Date/Time    Blood culture x two cultures. Draw prior to " antibiotics. [[id number]] Collected: 12/20/23 1316    Order Status: Completed Specimen: Blood Updated: 12/20/23 1958     Blood Culture, Routine No Growth to date    Narrative:      Aerobic and anaerobic  Collection has been rescheduled by CH10 at 12/20/2023 11:40 Reason:   Pt getting xray   Collection has been rescheduled by CH10 at 12/20/2023 11:44 Reason:   Getting midline Started   Collection has been rescheduled by CH10 at 12/20/2023 11:40 Reason:   Pt getting xray   Collection has been rescheduled by CH10 at 12/20/2023 11:44 Reason:   Getting midline Started     Blood culture x two cultures. Draw prior to antibiotics. [[id number]] Collected: 12/20/23 1217    Order Status: Completed Specimen: Blood Updated: 12/20/23 1917     Blood Culture, Routine No Growth to date    Narrative:      Aerobic and anaerobic  Collection has been rescheduled by CH10 at 12/20/2023 11:40 Reason:   Pt getting xray   Collection has been rescheduled by CH10 at 12/20/2023 11:44 Reason:   Getting midline Started   Collection has been rescheduled by CH10 at 12/20/2023 11:40 Reason:   Pt getting xray   Collection has been rescheduled by 10 at 12/20/2023 11:44 Reason:   Getting midline Started             MOST RECENT IMAGING  X-Ray Chest AP Portable  HISTORY: Sepsis    FINDINGS: Portable chest radiograph at 1135 hours compared to prior exams shows the cardiomediastinal silhouette and pulmonary vasculature are within normal limits. There are aortic vascular calcifications.    The lungs are normally expanded, with no consolidation, large pleural effusion, or evidence of pulmonary edema. No confluent infiltrates or pneumothorax. The bones are diffusely osteopenic, with no acute fractures.    IMPRESSION: No evidence of acute cardiopulmonary disease.    Electronically signed by:  Aleksey Atkins MD  12/20/2023 11:49 AM [facility] Workstation: [id number]      ECHOCARDIOGRAM RESULTS (last 5)  Results for orders placed during the hospital encounter of  03/20/23    Echo    Interpretation Summary  · The left ventricle is normal in size with moderate concentric hypertrophy and normal systolic function.  · The estimated ejection fraction is 57%.  · Normal left ventricular diastolic function.  · Normal right ventricular size with normal right ventricular systolic function.  · Moderate to severe tricuspid regurgitation.  · Mild-to-moderate mitral regurgitation.  · Moderate left atrial enlargement.  · Mild right atrial enlargement.  · There is mild pulmonary hypertension.      Results for orders placed during the hospital encounter of 03/06/23    Echo    Interpretation Summary  · The left ventricle is normal in size with normal systolic function.  · The estimated ejection fraction is 60%.  · Grade II left ventricular diastolic dysfunction.  · Normal right ventricular size with normal right ventricular systolic function.  · Moderate left atrial enlargement.  · Mild-to-moderate mitral regurgitation.  · Moderate to severe tricuspid regurgitation.  · There is mild pulmonary hypertension.  · There is mild aortic valve stenosis.  · Aortic valve area is 1.78 cm2; peak velocity is 2.01 m/s; mean gradient is 9 mmHg.      Results for orders placed during the hospital encounter of 01/26/22    Echo    Interpretation Summary  · The left ventricle is normal in size with mild concentric hypertrophy and normal systolic function.  · The estimated ejection fraction is 65%.  · Grade II left ventricular diastolic dysfunction.  · Atrial fibrillation not observed.  · Normal right ventricular size with normal right ventricular systolic function.  · Moderate to severe left atrial enlargement.  · Mild right atrial enlargement.  · Mild mitral regurgitation.  · Mild to moderate tricuspid regurgitation.  · Normal central venous pressure (3 mmHg).  · The estimated PA systolic pressure is 36 mmHg.  · Mildly elevated gradient accross mitral valve of around 6 mmHg at HR of 64 bpm. MVA by pressure half  time is normal, however this can be inaccurate.      Results for orders placed during the hospital encounter of 03/03/21    Echo Color Flow Doppler? Yes    Interpretation Summary  · The left ventricle is small with concentric remodeling and normal systolic function. The estimated ejection fraction is 63%  · The estimated PA systolic pressure is 38 mmHg.  · Grade II left ventricular diastolic dysfunction.  · Normal right ventricular size with normal right ventricular systolic function.  · Mild-to-moderate mitral regurgitation.  · Mild to moderate tricuspid regurgitation.  · Normal central venous pressure (3 mmHg).  · The patient converted from atrial fibrillation to normal sinus rhythm 03/03/2021      Results for orders placed during the hospital encounter of 12/13/19    Echo Color Flow Doppler? Yes    Interpretation Summary  · Mild concentric left ventricular hypertrophy.  · Normal left ventricular systolic function. The estimated ejection fraction is 70%  · Grade II (moderate) left ventricular diastolic dysfunction consistent with pseudonormalization.  · The estimated PA systolic pressure is 37 mm Hg  · No wall motion abnormalities.  · Normal right ventricular systolic function.  · Normal central venous pressure (3 mm Hg).  · Mild mitral sclerosis.  · Mild mitral regurgitation.  · Mild to moderate tricuspid regurgitation.      CURRENT/PREVIOUS VISIT EKG  Results for orders placed or performed during the hospital encounter of 12/20/23   EKG 12-lead    Collection Time: 12/21/23  7:03 AM    Narrative    Test Reason : I49.9,    Vent. Rate : 061 BPM     Atrial Rate : 061 BPM     P-R Int : 166 ms          QRS Dur : 072 ms      QT Int : 476 ms       P-R-T Axes : 084 -01 165 degrees     QTc Int : 479 ms    Normal sinus rhythm  ST and T wave abnormality, consider inferior ischemia  ST and T wave abnormality, consider anterolateral ischemia  Prolonged QT  Abnormal ECG  When compared with ECG of 20-DEC-2023 15:40,  Premature  supraventricular complexes are no longer Present  T wave inversion less evident in Inferior leads  Inverted T waves have replaced nonspecific T wave abnormality in Lateral  leads    Referred By: [email or web address]   SELF           Confirmed By:            ASSESSMENT/PLAN:     Active Hospital Problems    Diagnosis    ESRD (end stage renal disease)    Hypotension    Atrial fibrillation       ASSESSMENT & PLAN:     Afib RVR -now in NSR- HR Stable   Hypotension -Improved  ESRD - dialysis MWF   Anemia of chronic disease   Anxiety     RECOMMENDATIONS:    Patient may go home on Multaq 400 mg PO BID  Eliquis 2.5 mg PO BID  Midodrine TID      Tamara Sloan NP  Department of Cardiology  Date of Service: 12/21/2023

## 2023-12-21 NOTE — NURSING
Report called to Asif at 1620. Discharge instructions given to patient. Patient verbalized understanding. Two PIV's removed from patients left arm without difficulty, catheter tip intact. Tele removed and returned to monitor room. Patient discharged via facility transportation.

## 2023-12-21 NOTE — CARE UPDATE
12/20/23 2130   Patient Assessment/Suction   Level of Consciousness (AVPU) alert   Respiratory Effort Unlabored   Expansion/Accessory Muscles/Retractions no use of accessory muscles   All Lung Fields Breath Sounds clear   Rhythm/Pattern, Respiratory no shortness of breath reported   Cough Frequency no cough   PRE-TX-O2   Device (Oxygen Therapy) room air   SpO2 100 %   Pulse 72   Resp 20   Positioning   Head of Bed (HOB) Positioning HOB elevated;HOB at 30-45 degrees   Respiratory Evaluation   $ Care Plan Tech Time 15 min   $ Eval/Re-eval Charges Re-evaluation

## 2023-12-21 NOTE — NURSING
Nurses Note -- 4 Eyes      12/20/2023   11:20 PM      Skin assessed during: Admit      [x] No Altered Skin Integrity Present    [x]Prevention Measures Documented      [] Yes- Altered Skin Integrity Present or Discovered   [] LDA Added if Not in Epic (Describe Wound)   [] New Altered Skin Integrity was Present on Admit and Documented in LDA   [] Wound Image Taken    Wound Care Consulted? No    Attending Nurse:  Christie Henao RN/Staff Member:   [id number]

## 2023-12-21 NOTE — DISCHARGE SUMMARY
[facility] Medicine  Discharge Summary      Patient Name: Tyra Isaac  MRN: [id number]  [facility]: [id number]  Patient Class: IP- Inpatient  Admission Date: 12/20/2023  Hospital Length of Stay: 1 days  Discharge Date and Time:  12/21/2023 3:08 PM  Attending Physician: Tiffany Quarles MD   Discharging Provider: Tiffany Quarlse MD  Primary Care Provider: Kalpesh Goel MD    Primary Care Team: Networked reference to record PCT     HPI:   83-year-old  female with history of hypertension and ESRD on MWF to the ED on account of hypotension during dialysis.  Patient states that she was at dialysis when she started to feel dizzy, with associated shortness of breath.  She endorses palpitation but denies any chest pain, bilateral leg swelling, orthopnea.  She stated that she had an episode of vomiting, blood pressures and was found to be low in the 50s and was subsequently brought to the ED. fluids a dialysis center she denies any fever, contact with sick persons or recent travels.  On arrival to the ED, patient's blood pressure 59/41, patient was bolused IV fluids, patient was found to be in AFib and Cardiology was called who recommended amiodarone alongside Levophed.  Patient started on Levophed and converted to NSR prior to IV amiodarone GTT.  Patient states that she has a history of AFib and is currently on Eliquis.  On presentation to the ED, hemoglobin 8 0.6/27.6, potassium 3.2 and creatinine 4.2.  BNP 6 3 8 and troponin 28.0.  Review of home records shows that patient is on midodrine 3 times daily.    * No surgery found *      Hospital Course:    Cardiology evaluated patient and adjusted medications.  Patient was placed on amiodarone and midodrine was increased.    Physical examination on discharge:  Constitutional: No distress.   HENT: NC  Head: Atraumatic.   Cardiovascular: Normal rate, regular rhythm and normal heart sounds.   Pulmonary/Chest: Effort normal. No wheezes.    Abdominal: Soft. Bowel sounds are normal. No distension and no mass. No tenderness  Neurological: Alert.   Skin: Skin is warm and dry.   Psych: Appropriate mood and affect    I have seen the patient on the day of discharge and reviewed the discharge instructions as outlined.      Goals of Care Treatment Preferences:  Code Status: Full Code      Consults:   Consults (From admission, onward)          Status Ordering Provider     Inpatient consult to Cardiology  Once        Provider:  Valdo Cotton MD    Completed NANCY SOUZA     Inpatient consult to Nephrology  Once        Provider:  Jimenez Valero MD    Completed NANCY SOUZA            No new Assessment & Plan notes have been filed under this hospital service since the last note was generated.  Service: Hospital Medicine    Final Active Diagnoses:    Diagnosis Date Noted POA    PRINCIPAL PROBLEM:  Hypotension [I95.9] 02/24/2023 Yes    Atrial fibrillation [I48.91] 12/09/2022 Yes    ESRD (end stage renal disease) [N18.6] 12/15/2023 Yes      Problems Resolved During this Admission:       Discharged Condition: good    Disposition: Home or Self Care    Follow Up:   Follow-up Information       Kalpesh Goel MD Follow up in 1 week(s).    Specialty: Internal Medicine  Contact information:  [address] [place] [address]  [address] [place] [address] [id number] [phone]                           Patient Instructions:      Activity as tolerated       Significant Diagnostic Studies: Labs: BMP:   Recent Labs   Lab 12/20/23  1156 12/21/23  0329   GLU 81 64*    140   K 3.2* 4.6    105   CO2 26 26   BUN 17 24*   CREATININE 4.2* 5.4*   CALCIUM 7.2* 7.6*   MG 1.6 2.5   , CMP   Recent Labs   Lab 12/20/23  1156 12/21/23  0329    140   K 3.2* 4.6    105   CO2 26 26   GLU 81 64*   BUN 17 24*   CREATININE 4.2* 5.4*   CALCIUM 7.2* 7.6*   PROT 5.8*  --    ALBUMIN 3.1*  --    BILITOT 0.5  --    ALKPHOS 50*  --    AST 21  --    ALT 12  --    ANIONGAP 11 9   , CBC   Recent  "Labs   Lab 12/20/23  1156 12/20/23  1207 12/21/23  0330   WBC 4.79  --  5.38   HGB 8.6*  --  8.0*   HCT 27.6*   < > 26.0*     --  226    < > = values in this interval not displayed.   , Troponin No results for input(s): "TROPONINI" in the last 168 hours., and All labs within the past 24 hours have been reviewed  Microbiology: Blood Culture   Lab Results   Component Value Date    LABBLOO No Growth to date 12/20/2023    LABBLOO No Growth to date 12/20/2023     Radiology: X-Ray: CXR: X-Ray Chest 1 View (CXR): No results found for this visit on 12/20/23.  CT scan: CT ABDOMEN PELVIS WITH CONTRAST: No results found for this visit on 12/20/23.    Pending Diagnostic Studies:       Procedure Component Value Units Date/Time    Lactic acid, plasma #1 [[id number]]     Order Status: Sent Lab Status: No result     Specimen: Blood            Medications:  Reconciled Home Medications:      Medication List        START taking these medications      dronedarone 400 mg Tab  Commonly known as: MULTAQ  Take 1 tablet (400 mg total) by mouth 2 (two) times daily with meals.            CHANGE how you take these medications      midodrine 10 MG tablet  Commonly known as: PROAMATINE  Take 1 tablet (10 mg total) by mouth 3 (three) times daily with meals.  What changed:   medication strength  how much to take  when to take this            CONTINUE taking these medications      atorvastatin 40 MG tablet  Commonly known as: LIPITOR  Take 40 mg by mouth once daily.     b complex vitamins tablet  Take 1 tablet by mouth once daily.     dorzolamide-timolol 2-0.5% 22.3-6.8 mg/mL ophthalmic solution  Commonly known as: COSOPT  Place 1 drop into both eyes 2 (two) times daily.     ELIQUIS 2.5 mg Tab  Generic drug: apixaban  Take 2.5 mg by mouth 2 (two) times daily.     latanoprost 0.005 % ophthalmic solution  Place 1 drop into both eyes every evening.     loperamide 2 mg Tab  Commonly known as: IMODIUM A-D  Take 1 tablet (2 mg total) by mouth 4 " (four) times daily as needed (diarrhea).     ondansetron 4 MG tablet  Commonly known as: ZOFRAN  Take 1 tablet (4 mg total) by mouth every 8 (eight) hours as needed for Nausea.     senna-docusate 8.6-50 mg 8.6-50 mg per tablet  Commonly known as: PERICOLACE  Take 1 tablet by mouth 2 (two) times daily as needed for Constipation.              Indwelling Lines/Drains at time of discharge:   Lines/Drains/Airways       Drain  Duration                  Hemodialysis AV Fistula Right upper arm -- days                    Time spent on the discharge of patient: 35 minutes         Tiffany Quarles MD  Department of Hospital Medicine  [facility]

## 2023-12-21 NOTE — NURSING
Patient arrived to unit via stretcher from ED. Patient pulled over to bed without difficulty. Patient oriented to room, bed in lowest position, wheels locked, bed rails up x2, bed alarm initiated, call light and bedside table within reach. Safety measures addressed.

## 2023-12-21 NOTE — NURSING
Pt hypotensive multiple times throughout the night. Placed in trendelenburg. Dr Calix notified. Ordered 5mg midodrine.

## 2023-12-25 LAB
BACTERIA BLD CULT: NORMAL
BACTERIA BLD CULT: NORMAL

## 2024-01-04 ENCOUNTER — HOSPITAL ENCOUNTER (INPATIENT)
Facility: HOSPITAL | Age: 84
LOS: 1 days | Discharge: HOME HEALTH | DRG: 308 | End: 2024-01-08
Attending: EMERGENCY MEDICINE | Admitting: INTERNAL MEDICINE
Payer: MEDICARE

## 2024-01-04 ENCOUNTER — CLINICAL SUPPORT (OUTPATIENT)
Dept: FAMILY MEDICINE | Facility: CLINIC | Age: 84
End: 2024-01-04
Payer: MEDICARE

## 2024-01-04 VITALS
DIASTOLIC BLOOD PRESSURE: 72 MMHG | HEART RATE: 109 BPM | WEIGHT: 100 LBS | BODY MASS INDEX: 16.07 KG/M2 | SYSTOLIC BLOOD PRESSURE: 128 MMHG | HEIGHT: 66 IN

## 2024-01-04 DIAGNOSIS — Z79.01 CHRONIC ANTICOAGULATION: ICD-10-CM

## 2024-01-04 DIAGNOSIS — E87.5 HYPERKALEMIA: Primary | ICD-10-CM

## 2024-01-04 DIAGNOSIS — I48.91 A-FIB: ICD-10-CM

## 2024-01-04 DIAGNOSIS — N18.6 ESRD (END STAGE RENAL DISEASE): ICD-10-CM

## 2024-01-04 DIAGNOSIS — N18.6 BENIGN HYPERTENSION WITH ESRD (END-STAGE RENAL DISEASE): ICD-10-CM

## 2024-01-04 DIAGNOSIS — N18.6 STAGE 5 CHRONIC KIDNEY DISEASE ON CHRONIC DIALYSIS: ICD-10-CM

## 2024-01-04 DIAGNOSIS — Z99.2 END STAGE RENAL DISEASE ON DIALYSIS: ICD-10-CM

## 2024-01-04 DIAGNOSIS — R06.00 DYSPNEA, UNSPECIFIED TYPE: ICD-10-CM

## 2024-01-04 DIAGNOSIS — G31.84 MILD COGNITIVE IMPAIRMENT: ICD-10-CM

## 2024-01-04 DIAGNOSIS — Z99.2 STAGE 5 CHRONIC KIDNEY DISEASE ON CHRONIC DIALYSIS: ICD-10-CM

## 2024-01-04 DIAGNOSIS — I48.91 ATRIAL FIBRILLATION WITH RAPID VENTRICULAR RESPONSE: ICD-10-CM

## 2024-01-04 DIAGNOSIS — I95.9 HYPOTENSION: ICD-10-CM

## 2024-01-04 DIAGNOSIS — N18.6 END STAGE RENAL DISEASE ON DIALYSIS: ICD-10-CM

## 2024-01-04 DIAGNOSIS — E78.2 MULTIPLE-TYPE HYPERLIPIDEMIA: ICD-10-CM

## 2024-01-04 DIAGNOSIS — I95.3 HEMODIALYSIS-ASSOCIATED HYPOTENSION: ICD-10-CM

## 2024-01-04 DIAGNOSIS — R07.9 CHEST PAIN: ICD-10-CM

## 2024-01-04 DIAGNOSIS — I48.0 PAROXYSMAL ATRIAL FIBRILLATION: ICD-10-CM

## 2024-01-04 DIAGNOSIS — T82.9XXD DIALYSIS COMPLICATION, SUBSEQUENT ENCOUNTER: ICD-10-CM

## 2024-01-04 DIAGNOSIS — I12.0 BENIGN HYPERTENSION WITH ESRD (END-STAGE RENAL DISEASE): ICD-10-CM

## 2024-01-04 DIAGNOSIS — I25.118 CORONARY ARTERY DISEASE OF NATIVE HEART WITH STABLE ANGINA PECTORIS, UNSPECIFIED VESSEL OR LESION TYPE: ICD-10-CM

## 2024-01-04 DIAGNOSIS — R11.2 NAUSEA AND VOMITING, UNSPECIFIED VOMITING TYPE: Primary | ICD-10-CM

## 2024-01-04 DIAGNOSIS — I48.91 ATRIAL FIBRILLATION WITH RVR: ICD-10-CM

## 2024-01-04 PROBLEM — R79.89 ELEVATED LACTIC ACID LEVEL: Status: ACTIVE | Noted: 2024-01-04

## 2024-01-04 LAB
ALBUMIN SERPL BCP-MCNC: 4.5 G/DL (ref 3.5–5.2)
ALP SERPL-CCNC: 67 U/L (ref 55–135)
ALT SERPL W/O P-5'-P-CCNC: 16 U/L (ref 10–44)
ANION GAP SERPL CALC-SCNC: 15 MMOL/L (ref 8–16)
AST SERPL-CCNC: 27 U/L (ref 10–40)
BASOPHILS # BLD AUTO: 0.06 K/UL (ref 0–0.2)
BASOPHILS NFR BLD: 1 % (ref 0–1.9)
BILIRUB SERPL-MCNC: 0.8 MG/DL (ref 0.1–1)
BNP SERPL-MCNC: 2284 PG/ML (ref 0–99)
BUN SERPL-MCNC: 25 MG/DL (ref 8–23)
CALCIUM SERPL-MCNC: 9.7 MG/DL (ref 8.7–10.5)
CHLORIDE SERPL-SCNC: 96 MMOL/L (ref 95–110)
CO2 SERPL-SCNC: 29 MMOL/L (ref 23–29)
CREAT SERPL-MCNC: 6 MG/DL (ref 0.5–1.4)
DIFFERENTIAL METHOD BLD: ABNORMAL
EOSINOPHIL # BLD AUTO: 0.6 K/UL (ref 0–0.5)
EOSINOPHIL NFR BLD: 9.3 % (ref 0–8)
ERYTHROCYTE [DISTWIDTH] IN BLOOD BY AUTOMATED COUNT: 16 % (ref 11.5–14.5)
EST. GFR  (NO RACE VARIABLE): 6.5 ML/MIN/1.73 M^2
FERRITIN SERPL-MCNC: 2595.4 NG/ML (ref 20–300)
GLUCOSE SERPL-MCNC: 112 MG/DL (ref 70–110)
HCT VFR BLD AUTO: 38.1 % (ref 37–48.5)
HGB BLD-MCNC: 11.7 G/DL (ref 12–16)
IMM GRANULOCYTES # BLD AUTO: 0.02 K/UL (ref 0–0.04)
IMM GRANULOCYTES NFR BLD AUTO: 0.3 % (ref 0–0.5)
IRON SERPL-MCNC: 137 UG/DL (ref 30–160)
LACTATE SERPL-SCNC: 0.7 MMOL/L (ref 0.5–1.9)
LACTATE SERPL-SCNC: 2.2 MMOL/L (ref 0.5–1.9)
LYMPHOCYTES # BLD AUTO: 1.4 K/UL (ref 1–4.8)
LYMPHOCYTES NFR BLD: 22.7 % (ref 18–48)
MAGNESIUM SERPL-MCNC: 2.1 MG/DL (ref 1.6–2.6)
MCH RBC QN AUTO: 31.5 PG (ref 27–31)
MCHC RBC AUTO-ENTMCNC: 30.7 G/DL (ref 32–36)
MCV RBC AUTO: 102 FL (ref 82–98)
MONOCYTES # BLD AUTO: 0.9 K/UL (ref 0.3–1)
MONOCYTES NFR BLD: 13.9 % (ref 4–15)
NEUTROPHILS # BLD AUTO: 3.3 K/UL (ref 1.8–7.7)
NEUTROPHILS NFR BLD: 52.8 % (ref 38–73)
NRBC BLD-RTO: 0 /100 WBC
PLATELET # BLD AUTO: 499 K/UL (ref 150–450)
PMV BLD AUTO: 9.6 FL (ref 9.2–12.9)
POTASSIUM SERPL-SCNC: 3.6 MMOL/L (ref 3.5–5.1)
PROCALCITONIN SERPL IA-MCNC: 1.27 NG/ML (ref 0–0.5)
PROT SERPL-MCNC: 8.6 G/DL (ref 6–8.4)
RBC # BLD AUTO: 3.72 M/UL (ref 4–5.4)
SATURATED IRON: 47 % (ref 20–50)
SODIUM SERPL-SCNC: 140 MMOL/L (ref 136–145)
TOTAL IRON BINDING CAPACITY: 291 UG/DL (ref 250–450)
TRANSFERRIN SERPL-MCNC: 208 MG/DL (ref 200–375)
TROPONIN I SERPL HS-MCNC: 61.4 PG/ML (ref 0–14.9)
TSH SERPL DL<=0.005 MIU/L-ACNC: 1.92 UIU/ML (ref 0.34–5.6)
WBC # BLD AUTO: 6.26 K/UL (ref 3.9–12.7)

## 2024-01-04 PROCEDURE — G0378 HOSPITAL OBSERVATION PER HR: HCPCS

## 2024-01-04 PROCEDURE — 25000003 PHARM REV CODE 250: Performed by: EMERGENCY MEDICINE

## 2024-01-04 PROCEDURE — 83735 ASSAY OF MAGNESIUM: CPT | Performed by: EMERGENCY MEDICINE

## 2024-01-04 PROCEDURE — 36415 COLL VENOUS BLD VENIPUNCTURE: CPT | Performed by: EMERGENCY MEDICINE

## 2024-01-04 PROCEDURE — 5A1D70Z PERFORMANCE OF URINARY FILTRATION, INTERMITTENT, LESS THAN 6 HOURS PER DAY: ICD-10-PCS | Performed by: INTERNAL MEDICINE

## 2024-01-04 PROCEDURE — 96365 THER/PROPH/DIAG IV INF INIT: CPT

## 2024-01-04 PROCEDURE — 83605 ASSAY OF LACTIC ACID: CPT | Performed by: EMERGENCY MEDICINE

## 2024-01-04 PROCEDURE — 83605 ASSAY OF LACTIC ACID: CPT | Mod: 91 | Performed by: PHYSICAL THERAPY ASSISTANT

## 2024-01-04 PROCEDURE — 99999 PR PBB SHADOW E&M-EST. PATIENT-LVL III: CPT | Mod: PBBFAC,,, | Performed by: INTERNAL MEDICINE

## 2024-01-04 PROCEDURE — 84484 ASSAY OF TROPONIN QUANT: CPT | Performed by: EMERGENCY MEDICINE

## 2024-01-04 PROCEDURE — 94799 UNLISTED PULMONARY SVC/PX: CPT

## 2024-01-04 PROCEDURE — 99900035 HC TECH TIME PER 15 MIN (STAT)

## 2024-01-04 PROCEDURE — 96366 THER/PROPH/DIAG IV INF ADDON: CPT

## 2024-01-04 PROCEDURE — 93005 ELECTROCARDIOGRAM TRACING: CPT | Performed by: INTERNAL MEDICINE

## 2024-01-04 PROCEDURE — 99900031 HC PATIENT EDUCATION (STAT)

## 2024-01-04 PROCEDURE — 87040 BLOOD CULTURE FOR BACTERIA: CPT | Performed by: EMERGENCY MEDICINE

## 2024-01-04 PROCEDURE — 93010 ELECTROCARDIOGRAM REPORT: CPT | Mod: ,,, | Performed by: INTERNAL MEDICINE

## 2024-01-04 PROCEDURE — 25000242 PHARM REV CODE 250 ALT 637 W/ HCPCS: Performed by: PHYSICAL THERAPY ASSISTANT

## 2024-01-04 PROCEDURE — 83880 ASSAY OF NATRIURETIC PEPTIDE: CPT | Performed by: EMERGENCY MEDICINE

## 2024-01-04 PROCEDURE — 84145 PROCALCITONIN (PCT): CPT | Performed by: PHYSICAL THERAPY ASSISTANT

## 2024-01-04 PROCEDURE — 82728 ASSAY OF FERRITIN: CPT | Performed by: EMERGENCY MEDICINE

## 2024-01-04 PROCEDURE — 84443 ASSAY THYROID STIM HORMONE: CPT | Performed by: PHYSICAL THERAPY ASSISTANT

## 2024-01-04 PROCEDURE — 96361 HYDRATE IV INFUSION ADD-ON: CPT

## 2024-01-04 PROCEDURE — 80053 COMPREHEN METABOLIC PANEL: CPT | Performed by: EMERGENCY MEDICINE

## 2024-01-04 PROCEDURE — 25000003 PHARM REV CODE 250: Performed by: PHYSICAL THERAPY ASSISTANT

## 2024-01-04 PROCEDURE — 99291 CRITICAL CARE FIRST HOUR: CPT

## 2024-01-04 PROCEDURE — 83540 ASSAY OF IRON: CPT | Performed by: EMERGENCY MEDICINE

## 2024-01-04 PROCEDURE — 99214 OFFICE O/P EST MOD 30 MIN: CPT | Mod: S$GLB,,, | Performed by: INTERNAL MEDICINE

## 2024-01-04 PROCEDURE — 85025 COMPLETE CBC W/AUTO DIFF WBC: CPT | Performed by: EMERGENCY MEDICINE

## 2024-01-04 PROCEDURE — 94640 AIRWAY INHALATION TREATMENT: CPT

## 2024-01-04 RX ORDER — GLUCAGON 1 MG
1 KIT INJECTION
Status: DISCONTINUED | OUTPATIENT
Start: 2024-01-04 | End: 2024-01-08 | Stop reason: HOSPADM

## 2024-01-04 RX ORDER — ONDANSETRON HYDROCHLORIDE 2 MG/ML
4 INJECTION, SOLUTION INTRAVENOUS EVERY 8 HOURS PRN
Status: DISCONTINUED | OUTPATIENT
Start: 2024-01-04 | End: 2024-01-08 | Stop reason: HOSPADM

## 2024-01-04 RX ORDER — IBUPROFEN 200 MG
16 TABLET ORAL
Status: DISCONTINUED | OUTPATIENT
Start: 2024-01-04 | End: 2024-01-08 | Stop reason: HOSPADM

## 2024-01-04 RX ORDER — MIDODRINE HYDROCHLORIDE 10 MG/1
10 TABLET ORAL 3 TIMES DAILY
Status: DISCONTINUED | OUTPATIENT
Start: 2024-01-04 | End: 2024-01-08 | Stop reason: HOSPADM

## 2024-01-04 RX ORDER — NALOXONE HCL 0.4 MG/ML
0.02 VIAL (ML) INJECTION
Status: DISCONTINUED | OUTPATIENT
Start: 2024-01-04 | End: 2024-01-08 | Stop reason: HOSPADM

## 2024-01-04 RX ORDER — TALC
6 POWDER (GRAM) TOPICAL NIGHTLY PRN
Status: DISCONTINUED | OUTPATIENT
Start: 2024-01-04 | End: 2024-01-08 | Stop reason: HOSPADM

## 2024-01-04 RX ORDER — SODIUM CHLORIDE 0.9 % (FLUSH) 0.9 %
10 SYRINGE (ML) INJECTION EVERY 12 HOURS PRN
Status: DISCONTINUED | OUTPATIENT
Start: 2024-01-04 | End: 2024-01-08 | Stop reason: HOSPADM

## 2024-01-04 RX ORDER — IPRATROPIUM BROMIDE AND ALBUTEROL SULFATE 2.5; .5 MG/3ML; MG/3ML
3 SOLUTION RESPIRATORY (INHALATION)
Status: DISCONTINUED | OUTPATIENT
Start: 2024-01-04 | End: 2024-01-08 | Stop reason: HOSPADM

## 2024-01-04 RX ORDER — POLYETHYLENE GLYCOL 3350 17 G/17G
17 POWDER, FOR SOLUTION ORAL DAILY
Status: DISCONTINUED | OUTPATIENT
Start: 2024-01-05 | End: 2024-01-08 | Stop reason: HOSPADM

## 2024-01-04 RX ORDER — SODIUM CHLORIDE 9 MG/ML
500 INJECTION, SOLUTION INTRAVENOUS
Status: COMPLETED | OUTPATIENT
Start: 2024-01-04 | End: 2024-01-04

## 2024-01-04 RX ORDER — LEVALBUTEROL INHALATION SOLUTION 0.63 MG/3ML
1 SOLUTION RESPIRATORY (INHALATION)
COMMUNITY
Start: 2023-12-28

## 2024-01-04 RX ORDER — LOPERAMIDE HYDROCHLORIDE 2 MG/1
2 CAPSULE ORAL 4 TIMES DAILY PRN
Status: DISCONTINUED | OUTPATIENT
Start: 2024-01-04 | End: 2024-01-08 | Stop reason: HOSPADM

## 2024-01-04 RX ORDER — PREDNISONE 20 MG/1
20 TABLET ORAL DAILY
Status: ON HOLD | COMMUNITY
Start: 2023-12-28 | End: 2024-01-08 | Stop reason: HOSPADM

## 2024-01-04 RX ORDER — LATANOPROST 50 UG/ML
1 SOLUTION/ DROPS OPHTHALMIC NIGHTLY
Status: DISCONTINUED | OUTPATIENT
Start: 2024-01-04 | End: 2024-01-08 | Stop reason: HOSPADM

## 2024-01-04 RX ORDER — ATORVASTATIN CALCIUM 40 MG/1
40 TABLET, FILM COATED ORAL DAILY
Status: DISCONTINUED | OUTPATIENT
Start: 2024-01-05 | End: 2024-01-08 | Stop reason: HOSPADM

## 2024-01-04 RX ORDER — IBUPROFEN 200 MG
24 TABLET ORAL
Status: DISCONTINUED | OUTPATIENT
Start: 2024-01-04 | End: 2024-01-08 | Stop reason: HOSPADM

## 2024-01-04 RX ORDER — DORZOLAMIDE HYDROCHLORIDE AND TIMOLOL MALEATE 20; 5 MG/ML; MG/ML
1 SOLUTION/ DROPS OPHTHALMIC 2 TIMES DAILY
Status: DISCONTINUED | OUTPATIENT
Start: 2024-01-04 | End: 2024-01-08 | Stop reason: HOSPADM

## 2024-01-04 RX ORDER — ACETAMINOPHEN 325 MG/1
650 TABLET ORAL EVERY 4 HOURS PRN
Status: DISCONTINUED | OUTPATIENT
Start: 2024-01-04 | End: 2024-01-08 | Stop reason: HOSPADM

## 2024-01-04 RX ADMIN — IPRATROPIUM BROMIDE AND ALBUTEROL SULFATE 3 ML: 2.5; .5 SOLUTION RESPIRATORY (INHALATION) at 08:01

## 2024-01-04 RX ADMIN — SODIUM CHLORIDE 500 ML: 0.9 INJECTION, SOLUTION INTRAVENOUS at 11:01

## 2024-01-04 RX ADMIN — MIDODRINE HYDROCHLORIDE 10 MG: 2.5 TABLET ORAL at 08:01

## 2024-01-04 RX ADMIN — DRONEDARONE 400 MG: 400 TABLET, FILM COATED ORAL at 05:01

## 2024-01-04 RX ADMIN — DEXTROSE MONOHYDRATE 5 MG/HR: 50 INJECTION, SOLUTION INTRAVENOUS at 12:01

## 2024-01-04 RX ADMIN — APIXABAN 2.5 MG: 2.5 TABLET, FILM COATED ORAL at 08:01

## 2024-01-04 NOTE — H&P
Formerly [facility], later [facility] - Emergency Dept  Hospital Medicine  History & Physical    Patient Name: Tyra Isaac  MRN: [id number]  Patient Class: OP- Observation  Admission Date: 1/4/2024  Attending Physician: Rosendo Torres MD   Primary Care Provider: Kalpesh Goel MD         Patient information was obtained from patient and ER records.     Subjective:     Principal Problem:Atrial fibrillation with rapid ventricular response    Chief Complaint:   Chief Complaint   Patient presents with    Vomiting    Nausea    Shortness of Breath    Chest Pain        HPI: Pt is an 83-year-old  female with history of hypertension and ESRD on MWF. Pt presents to the ED today from PCP appointment due to episode of vomiting, rapid heart rate, and hypotension. At time of episode, pt complains of mild dyspnea and chest discomfort. Pt has history of hypotension during dialysis and is on midodrine.  On arrival to ED, pt found to be hypotensive of 70/54, fluid bolus given. Pt found to be in Afib RVR in ED HR of 130 and was started on diltiazem drip. Pt has history of afib and is on chronic eliquis. Pt was recently hospitalized for hypotension and afib and was subsequently discharged to SNF. Pt was discharged from SNF 2 days ago. Pt had last dialysis session yesterday. Hemoglobin 11.7, potassium 3.6, creatinine 6.0, BNP 2284, magnesium 2.1, lactic 2.2, and troponin 61.4.  Pt recently was diagnosed with covid 12/4/23 and continues to have viral cough. Pt does endorse nebulizer treatments have been helpful. Pt denies current chest pain, SOB, nausea, abdominal pain, or heart palpitations. Pt full code.     Past Medical History:   Diagnosis Date    A-fib     Anxiety     Depression     Disorder of kidney and ureter     Encephalopathy acute 1/1/2018    End stage kidney disease 6/17/2017    Gout     Hyperlipidemia     Hypertension     Moderate episode of recurrent major depressive disorder 1/17/2018    Nephropathy  hypertensive, 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    Osteopenia of multiple sites 3/9/2018    Based upon bone density measurements. Patient also has chronic kidney disease.    Stroke 11/2016       Past Surgical History:   Procedure Laterality Date    ANGIOGRAM, CORONARY, WITH LEFT HEART CATHETERIZATION N/A 2/7/2022    Procedure: Angiogram, Coronary, with Left Heart Cath;  Surgeon: Gino Leal MD;  Location: [facility] CATH/EP LAB;  Service: Cardiology;  Laterality: N/A;    CARDIAC SURGERY      stents    EYE SURGERY      WRIST SURGERY         Review of patient's allergies indicates:   Allergen Reactions    Cyclobenzaprine     Fish containing products Hives    Peanut Other (See Comments)    Tramadol Itching       No current facility-administered medications on file prior to encounter.     Current Outpatient Medications on File Prior to Encounter   Medication Sig    predniSONE (DELTASONE) 20 MG tablet Take 20 mg by mouth once daily.    atorvastatin (LIPITOR) 40 MG tablet Take 40 mg by mouth once daily.    b complex vitamins tablet Take 1 tablet by mouth once daily.    dorzolamide-timolol 2-0.5% (COSOPT) 22.3-6.8 mg/mL ophthalmic solution Place 1 drop into both eyes 2 (two) times daily.    dronedarone (MULTAQ) 400 mg Tab Take 1 tablet (400 mg total) by mouth 2 (two) times daily with meals.    ELIQUIS 2.5 mg Tab Take 2.5 mg by mouth 2 (two) times daily.    latanoprost 0.005 % ophthalmic solution Place 1 drop into both eyes every evening.    levalbuterol (XOPENEX) 0.63 mg/3 mL nebulizer solution SMARTSIG:Via Inhaler    loperamide (IMODIUM A-D) 2 mg Tab Take 1 tablet (2 mg total) by mouth 4 (four) times daily as needed (diarrhea).    midodrine (PROAMATINE) 10 MG tablet Take 1 tablet (10 mg total) by mouth 3 (three) times daily with meals.    ondansetron (ZOFRAN) 4 MG tablet Take 1 tablet (4  mg total) by mouth every 8 (eight) hours as needed for Nausea.    senna-docusate 8.6-50 mg (PERICOLACE) 8.6-50 mg per tablet Take 1 tablet by mouth 2 (two) times daily as needed for Constipation.     Family History       Problem Relation (Age of Onset)    Cancer Father    Heart disease Mother          Tobacco Use    Smoking status: Never    Smokeless tobacco: Never   Substance and Sexual Activity    Alcohol use: No    Drug use: No    Sexual activity: Not Currently     Review of Systems   Constitutional: Negative.  Negative for appetite change, chills, fatigue, fever and unexpected weight change.   HENT: Negative.  Negative for congestion, ear pain, hearing loss, rhinorrhea, sore throat and tinnitus.    Eyes: Negative.  Negative for pain, discharge and redness.   Respiratory:  Positive for shortness of breath. Negative for cough, wheezing and stridor.    Cardiovascular:  Positive for chest pain. Negative for palpitations and leg swelling.   Gastrointestinal:  Positive for nausea and vomiting. Negative for abdominal distention, abdominal pain, constipation and diarrhea.   Endocrine: Negative.  Negative for cold intolerance, heat intolerance, polydipsia, polyphagia and polyuria.   Genitourinary: Negative.  Negative for decreased urine volume, difficulty urinating, flank pain, hematuria and urgency.   Musculoskeletal: Negative.  Negative for arthralgias, gait problem and myalgias.   Skin: Negative.  Negative for pallor and rash.   Allergic/Immunologic: Negative.  Negative for environmental allergies, food allergies and immunocompromised state.   Neurological:  Positive for weakness (general). Negative for dizziness, syncope, facial asymmetry and headaches.   Hematological: Negative.    Psychiatric/Behavioral: Negative.  Negative for agitation, confusion, self-injury and suicidal ideas.      Objective:     Vital Signs (Most Recent):  Temp: 97.5 °F (36.4 °C) (01/04/24 1049)  Pulse: 61 (01/04/24 1531)  Resp: 20 (01/04/24  1459)  BP: (!) 176/74 (01/04/24 1531)  SpO2: 100 % (01/04/24 1531) Vital Signs (24h Range):  Temp:  [97.5 °F (36.4 °C)] 97.5 °F (36.4 °C)  Pulse:  [] 61  Resp:  [17-20] 20  SpO2:  [95 %-100 %] 100 %  BP: ()/(54-98) 176/74     Weight: 49.9 kg (110 lb)  Body mass index is 17.75 kg/m².     Physical Exam  Vitals reviewed.   Constitutional:       General: She is not in acute distress.     Appearance: Normal appearance. She is normal weight. She is not toxic-appearing.   HENT:      Head: Normocephalic and atraumatic.      Nose: Nose normal. No congestion or rhinorrhea.      Mouth/Throat:      Mouth: Mucous membranes are moist.      Pharynx: Oropharynx is clear.   Eyes:      General:         Right eye: No discharge.         Left eye: No discharge.      Extraocular Movements: Extraocular movements intact.      Conjunctiva/sclera: Conjunctivae normal.      Pupils: Pupils are equal, round, and reactive to light.   Cardiovascular:      Rate and Rhythm: Normal rate. Rhythm irregular.      Pulses: Normal pulses.      Heart sounds: Normal heart sounds.   Pulmonary:      Effort: Pulmonary effort is normal. No respiratory distress.      Breath sounds: Normal breath sounds.   Chest:      Chest wall: No tenderness.   Abdominal:      General: Abdomen is flat. Bowel sounds are normal. There is no distension.      Palpations: Abdomen is soft.      Tenderness: There is no abdominal tenderness. There is no guarding.   Musculoskeletal:         General: No swelling. Normal range of motion.      Cervical back: Normal range of motion and neck supple.   Skin:     General: Skin is warm and dry.      Findings: No rash.   Neurological:      General: No focal deficit present.      Mental Status: She is alert and oriented to person, place, and time. Mental status is at baseline.      Motor: No weakness.   Psychiatric:         Mood and Affect: Mood normal.            CRANIAL NERVES     CN III, IV, VI   Pupils are equal, round, and  reactive to light.       Significant Labs: All pertinent labs within the past 24 hours have been reviewed.  CBC:   Recent Labs   Lab 01/04/24  1109   WBC 6.26   HGB 11.7*   HCT 38.1   *     CMP:   Recent Labs   Lab 01/04/24  1109      K 3.6   CL 96   CO2 29   *   BUN 25*   CREATININE 6.0*   CALCIUM 9.7   PROT 8.6*   ALBUMIN 4.5   BILITOT 0.8   ALKPHOS 67   AST 27   ALT 16   ANIONGAP 15     Lactic Acid:   Recent Labs   Lab 01/04/24  1109   LACTATE 2.2*     Magnesium:   Recent Labs   Lab 01/04/24  1109   MG 2.1     Troponin:   Recent Labs   Lab 01/04/24  1109   TROPONINIHS 61.4*       Significant Imaging: I have reviewed all pertinent imaging results/findings within the past 24 hours.  Assessment/Plan:     * Atrial fibrillation with RVR with history of known paroxysmal atrial fibrillation  Afib RVR of 120, started on diltiazem drip in ED  EKG now NSR, rate controlled, monitor on telemetry   Continue eliquis   EKG PRN for chest pain     Elevated lactic acid level  Lactic 2.2, afebrile without leukocytosis   IV fluids given in ED, will give conservatively and only as necessary due to volume overload and ESRD  Chest xray normal, procal pending, will trend lactic       Hypotension  76/54 on arrival, fluids given in ED  Continue to monitor vitals, continue home regimen of midodrine PRN      Anemia of chronic disease  Current CBC reviewed-   Lab Results   Component Value Date    HGB 11.7 (L) 01/04/2024    HCT 38.1 01/04/2024     Monitor serial CBC and transfuse if patient becomes hemodynamically unstable, symptomatic or H/H drops below 7/21.    Elevated troponin  Troponin 61.4, no chest pain or SOB on exam   Will monitor cardiac enzymes       End stage renal disease on dialysis  Creatine elevated, nephrology consulted for completion of dialysis MWF and recs. BMP reviewed- noted Estimated Creatinine Clearance: 5.6 mL/min (A) (based on SCr of 6 mg/dL (H)). according to latest data. Based on current GFR,  CKD stage is end stage.  Monitor UOP and serial BMP and adjust therapy as needed. Renally dose meds. Avoid nephrotoxic medications and procedures.    Multiple-type hyperlipidemia  Continue statin         VTE Risk Mitigation (From admission, onward)           Ordered     Reason for No Pharmacological VTE Prophylaxis  Once        Question:  Reasons:  Answer:  Already adequately anticoagulated on oral Anticoagulants    01/04/24 1537     IP VTE HIGH RISK PATIENT  Once         01/04/24 1537     Place sequential compression device  Until discontinued         01/04/24 1537                    On 01/04/2024, patient should be placed in hospital observation services under my care in collaboration with Dr Torres.       MATTIE Rebolledo  Department of Hospital Medicine  [facility] - Emergency Dept

## 2024-01-04 NOTE — HPI
Pt is an 83-year-old  female with history of hypertension and ESRD on MWF. Pt presents to the ED today from PCP appointment due to episode of vomiting, rapid heart rate, and hypotension. At time of episode, pt complains of mild dyspnea and chest discomfort. Pt has history of hypotension during dialysis and is on midodrine.  On arrival to ED, pt found to be hypotensive of 70/54, fluid bolus given. Pt found to be in Afib RVR in ED HR of 130 and was started on diltiazem drip. Pt has history of afib and is on chronic eliquis. Pt was recently hospitalized for hypotension and afib and was subsequently discharged to SNF. Pt was discharged from SNF 2 days ago. Pt had last dialysis session yesterday. Hemoglobin 11.7, potassium 3.6, creatinine 6.0, BNP 2284, magnesium 2.1, lactic 2.2, and troponin 61.4.  Pt recently was diagnosed with covid 12/4/23 and continues to have viral cough. Pt does endorse nebulizer treatments have been helpful. Pt denies current chest pain, SOB, nausea, abdominal pain, or heart palpitations. Pt full code.

## 2024-01-04 NOTE — ASSESSMENT & PLAN NOTE
76/54 on arrival, fluids given in ED  Continue to monitor vitals, continue home regimen of midodrine PRN

## 2024-01-04 NOTE — SUBJECTIVE & OBJECTIVE
Past Medical History:   Diagnosis Date    A-fib     Anxiety     Depression     Disorder of kidney and ureter     Encephalopathy acute 1/1/2018    End stage kidney disease 6/17/2017    Gout     Hyperlipidemia     Hypertension     Moderate episode of recurrent major depressive disorder 1/17/2018    Nephropathy hypertensive, 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    Osteopenia of multiple sites 3/9/2018    Based upon bone density measurements. Patient also has chronic kidney disease.    Stroke 11/2016       Past Surgical History:   Procedure Laterality Date    ANGIOGRAM, CORONARY, WITH LEFT HEART CATHETERIZATION N/A 2/7/2022    Procedure: Angiogram, Coronary, with Left Heart Cath;  Surgeon: Gino Leal MD;  Location: [facility] CATH/EP LAB;  Service: Cardiology;  Laterality: N/A;    CARDIAC SURGERY      stents    EYE SURGERY      WRIST SURGERY         Review of patient's allergies indicates:   Allergen Reactions    Cyclobenzaprine     Fish containing products Hives    Peanut Other (See Comments)    Tramadol Itching       No current facility-administered medications on file prior to encounter.     Current Outpatient Medications on File Prior to Encounter   Medication Sig    predniSONE (DELTASONE) 20 MG tablet Take 20 mg by mouth once daily.    atorvastatin (LIPITOR) 40 MG tablet Take 40 mg by mouth once daily.    b complex vitamins tablet Take 1 tablet by mouth once daily.    dorzolamide-timolol 2-0.5% (COSOPT) 22.3-6.8 mg/mL ophthalmic solution Place 1 drop into both eyes 2 (two) times daily.    dronedarone (MULTAQ) 400 mg Tab Take 1 tablet (400 mg total) by mouth 2 (two) times daily with meals.    ELIQUIS 2.5 mg Tab Take 2.5 mg by mouth 2 (two) times daily.    latanoprost 0.005 % ophthalmic solution Place 1 drop into both eyes every evening.    levalbuterol (XOPENEX) 0.63 mg/3 mL nebulizer  solution SMARTSIG:Via Inhaler    loperamide (IMODIUM A-D) 2 mg Tab Take 1 tablet (2 mg total) by mouth 4 (four) times daily as needed (diarrhea).    midodrine (PROAMATINE) 10 MG tablet Take 1 tablet (10 mg total) by mouth 3 (three) times daily with meals.    ondansetron (ZOFRAN) 4 MG tablet Take 1 tablet (4 mg total) by mouth every 8 (eight) hours as needed for Nausea.    senna-docusate 8.6-50 mg (PERICOLACE) 8.6-50 mg per tablet Take 1 tablet by mouth 2 (two) times daily as needed for Constipation.     Family History       Problem Relation (Age of Onset)    Cancer Father    Heart disease Mother          Tobacco Use    Smoking status: Never    Smokeless tobacco: Never   Substance and Sexual Activity    Alcohol use: No    Drug use: No    Sexual activity: Not Currently     Review of Systems   Constitutional: Negative.  Negative for appetite change, chills, fatigue, fever and unexpected weight change.   HENT: Negative.  Negative for congestion, ear pain, hearing loss, rhinorrhea, sore throat and tinnitus.    Eyes: Negative.  Negative for pain, discharge and redness.   Respiratory:  Positive for shortness of breath. Negative for cough, wheezing and stridor.    Cardiovascular:  Positive for chest pain. Negative for palpitations and leg swelling.   Gastrointestinal:  Positive for nausea and vomiting. Negative for abdominal distention, abdominal pain, constipation and diarrhea.   Endocrine: Negative.  Negative for cold intolerance, heat intolerance, polydipsia, polyphagia and polyuria.   Genitourinary: Negative.  Negative for decreased urine volume, difficulty urinating, flank pain, hematuria and urgency.   Musculoskeletal: Negative.  Negative for arthralgias, gait problem and myalgias.   Skin: Negative.  Negative for pallor and rash.   Allergic/Immunologic: Negative.  Negative for environmental allergies, food allergies and immunocompromised state.   Neurological:  Positive for weakness (general). Negative for dizziness,  syncope, facial asymmetry and headaches.   Hematological: Negative.    Psychiatric/Behavioral: Negative.  Negative for agitation, confusion, self-injury and suicidal ideas.      Objective:     Vital Signs (Most Recent):  Temp: 97.5 °F (36.4 °C) (01/04/24 1049)  Pulse: 61 (01/04/24 1531)  Resp: 20 (01/04/24 1459)  BP: (!) 176/74 (01/04/24 1531)  SpO2: 100 % (01/04/24 1531) Vital Signs (24h Range):  Temp:  [97.5 °F (36.4 °C)] 97.5 °F (36.4 °C)  Pulse:  [] 61  Resp:  [17-20] 20  SpO2:  [95 %-100 %] 100 %  BP: ()/(54-98) 176/74     Weight: 49.9 kg (110 lb)  Body mass index is 17.75 kg/m².     Physical Exam  Vitals reviewed.   Constitutional:       General: She is not in acute distress.     Appearance: Normal appearance. She is normal weight. She is not toxic-appearing.   HENT:      Head: Normocephalic and atraumatic.      Nose: Nose normal. No congestion or rhinorrhea.      Mouth/Throat:      Mouth: Mucous membranes are moist.      Pharynx: Oropharynx is clear.   Eyes:      General:         Right eye: No discharge.         Left eye: No discharge.      Extraocular Movements: Extraocular movements intact.      Conjunctiva/sclera: Conjunctivae normal.      Pupils: Pupils are equal, round, and reactive to light.   Cardiovascular:      Rate and Rhythm: Normal rate. Rhythm irregular.      Pulses: Normal pulses.      Heart sounds: Normal heart sounds.   Pulmonary:      Effort: Pulmonary effort is normal. No respiratory distress.      Breath sounds: Normal breath sounds.   Chest:      Chest wall: No tenderness.   Abdominal:      General: Abdomen is flat. Bowel sounds are normal. There is no distension.      Palpations: Abdomen is soft.      Tenderness: There is no abdominal tenderness. There is no guarding.   Musculoskeletal:         General: No swelling. Normal range of motion.      Cervical back: Normal range of motion and neck supple.   Skin:     General: Skin is warm and dry.      Findings: No rash.    Neurological:      General: No focal deficit present.      Mental Status: She is alert and oriented to person, place, and time. Mental status is at baseline.      Motor: No weakness.   Psychiatric:         Mood and Affect: Mood normal.            CRANIAL NERVES     CN III, IV, VI   Pupils are equal, round, and reactive to light.       Significant Labs: All pertinent labs within the past 24 hours have been reviewed.  CBC:   Recent Labs   Lab 01/04/24  1109   WBC 6.26   HGB 11.7*   HCT 38.1   *     CMP:   Recent Labs   Lab 01/04/24  1109      K 3.6   CL 96   CO2 29   *   BUN 25*   CREATININE 6.0*   CALCIUM 9.7   PROT 8.6*   ALBUMIN 4.5   BILITOT 0.8   ALKPHOS 67   AST 27   ALT 16   ANIONGAP 15     Lactic Acid:   Recent Labs   Lab 01/04/24  1109   LACTATE 2.2*     Magnesium:   Recent Labs   Lab 01/04/24  1109   MG 2.1     Troponin:   Recent Labs   Lab 01/04/24  1109   TROPONINIHS 61.4*       Significant Imaging: I have reviewed all pertinent imaging results/findings within the past 24 hours.

## 2024-01-04 NOTE — ASSESSMENT & PLAN NOTE
Creatine elevated, nephrology consulted for completion of dialysis MWF and recs. BMP reviewed- noted Estimated Creatinine Clearance: 5.6 mL/min (A) (based on SCr of 6 mg/dL (H)). according to latest data. Based on current GFR, CKD stage is end stage.  Monitor UOP and serial BMP and adjust therapy as needed. Renally dose meds. Avoid nephrotoxic medications and procedures.

## 2024-01-04 NOTE — ASSESSMENT & PLAN NOTE
Current CBC reviewed-   Lab Results   Component Value Date    HGB 11.7 (L) 01/04/2024    HCT 38.1 01/04/2024     Monitor serial CBC and transfuse if patient becomes hemodynamically unstable, symptomatic or H/H drops below 7/21.
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
## 2024-01-04 NOTE — ASSESSMENT & PLAN NOTE
Afib RVR of 120, started on diltiazem drip in ED  EKG now NSR, rate controlled, monitor on telemetry   Continue eliquis   EKG PRN for chest pain

## 2024-01-04 NOTE — PHARMACY MED REC
"              .        Admission Medication History     The home medication history was taken by Maryanne Smyth.    You may go to "Admission" then "Reconcile Home Medications" tabs to review and/or act upon these items.     The home medication list has been updated by the Pharmacy department.   Please read ALL comments highlighted in yellow.   Please address this information as you see fit.    Feel free to contact us if you have any questions or require assistance.      Medications listed below were obtained from: Patient/family and Analytic software- Process Relations  No current facility-administered medications on file prior to encounter.     Current Outpatient Medications on File Prior to Encounter   Medication Sig Dispense Refill    atorvastatin (LIPITOR) 40 MG tablet Take 40 mg by mouth once daily.      b complex vitamins tablet Take 1 tablet by mouth once daily.      dorzolamide-timolol 2-0.5% (COSOPT) 22.3-6.8 mg/mL ophthalmic solution Place 1 drop into both eyes 2 (two) times daily.      dronedarone (MULTAQ) 400 mg Tab Take 1 tablet (400 mg total) by mouth 2 (two) times daily with meals. 180 tablet 0    ELIQUIS 2.5 mg Tab Take 2.5 mg by mouth 2 (two) times daily.      latanoprost 0.005 % ophthalmic solution Place 1 drop into both eyes every evening.      levalbuterol (XOPENEX) 0.63 mg/3 mL nebulizer solution Take 1 ampule by nebulization every 4 to 6 hours as needed for Wheezing or Shortness of Breath.      loperamide (IMODIUM A-D) 2 mg Tab Take 1 tablet (2 mg total) by mouth 4 (four) times daily as needed (diarrhea). 3 tablet 0    midodrine (PROAMATINE) 10 MG tablet Take 1 tablet (10 mg total) by mouth 3 (three) times daily with meals. 270 tablet 0    ondansetron (ZOFRAN) 4 MG tablet Take 1 tablet (4 mg total) by mouth every 8 (eight) hours as needed for Nausea. 15 tablet 0    predniSONE (DELTASONE) 20 MG tablet Take 20 mg by mouth once daily.      senna-docusate 8.6-50 mg (PERICOLACE) 8.6-50 mg per tablet Take 1 " tablet by mouth 2 (two) times daily as needed for Constipation. 30 tablet 0       Potential issues to be addressed PRIOR TO DISCHARGE  Patient reported not taking the following medications: (Prednisone). These medications remain on the home medication list. Please address accordingly.     Maryanne Smyth  EXT 1929

## 2024-01-04 NOTE — LETTER
January 4, 2024    Emergency room physician   [facility]    Saimaummmaty Isaac  [address] [id number]  Date of birth 1940             [facility] - [address] [place] [id number]  Phone: [phone]  Fax: [phone] Dear Colleague    Kindly evaluate and treat for persistent nausea, vomiting and chest discomfort with shortness of breath.  Patient is end-stage kidney disease on dialysis with frequent hospitalizations.      If you have any questions or concerns, please don't hesitate to call.    Sincerely,        Kalpesh Goel MD

## 2024-01-04 NOTE — ED PROVIDER NOTES
Encounter Date: 1/4/2024       History     Chief Complaint   Patient presents with    Vomiting    Nausea    Shortness of Breath    Chest Pain     Patient presents emergency department with reported shortness of breath chest discomfort nausea vomiting onset symptoms this morning patient did attend dialysis yesterday she has a history of end-stage renal disease atrial fibrillation she presented to Dr. Blackmon's office this morning for evaluation following recent hospitalization where she was found to be hypotensive and tachycardic with active nausea vomiting and diaphoresis she was sent to the emergency department for further evaluation arrival in the ER patient was found to be in AFib with rapid ventricular response she denies any recent changes in her medications she denies any antecedent diarrhea she was able to sit for dialysis yesterday without difficulty she reports general malaise and fatigue        Review of patient's allergies indicates:   Allergen Reactions    Cyclobenzaprine     Fish containing products Hives    Peanut Other (See Comments)    Tramadol Itching     Past Medical History:   Diagnosis Date    A-fib     Anxiety     Depression     Disorder of kidney and ureter     Encephalopathy acute 1/1/2018    End stage kidney disease 6/17/2017    Gout     Hyperlipidemia     Hypertension     Moderate episode of recurrent major depressive disorder 1/17/2018    Nephropathy hypertensive, 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    Osteopenia of multiple sites 3/9/2018    Based upon bone density measurements. Patient also has chronic kidney disease.    Stroke 11/2016     Past Surgical History:   Procedure Laterality Date    ANGIOGRAM, CORONARY, WITH LEFT HEART CATHETERIZATION N/A 2/7/2022    Procedure: Angiogram, Coronary, with Left Heart Cath;  Surgeon: Gino Leal MD;  Location:  [facility] CATH/EP LAB;  Service: Cardiology;  Laterality: N/A;    CARDIAC SURGERY      stents    EYE SURGERY      WRIST SURGERY       Family History   Problem Relation Age of Onset    Heart disease Mother     Cancer Father      Social History     Tobacco Use    Smoking status: Never    Smokeless tobacco: Never   Substance Use Topics    Alcohol use: No    Drug use: No     Review of Systems   Constitutional:  Positive for activity change and fatigue. Negative for fever.   HENT:  Negative for congestion.    Respiratory:  Positive for shortness of breath.    Cardiovascular:  Positive for chest pain and palpitations.   Gastrointestinal:  Positive for nausea and vomiting.   Genitourinary:  Negative for dysuria.       Physical Exam     Initial Vitals [01/04/24 1049]   BP Pulse Resp Temp SpO2   (!) 76/54 (!) 130 17 97.5 °F (36.4 °C) 95 %      MAP       --         Physical Exam    Constitutional: She appears well-developed and well-nourished. She appears distressed.   HENT:   Head: Normocephalic and atraumatic.   Right Ear: External ear normal.   Left Ear: External ear normal.   Mouth/Throat: Oropharynx is clear and moist.   Eyes: Conjunctivae and EOM are normal. Pupils are equal, round, and reactive to light.   Neck: Neck supple.   Normal range of motion.  Cardiovascular:  Normal heart sounds and intact distal pulses.           Irregularly irregular tachycardic   Pulmonary/Chest: She is in respiratory distress. She has rales.   Abdominal: Abdomen is soft. Bowel sounds are normal. There is no abdominal tenderness.   Musculoskeletal:         General: No edema. Normal range of motion.      Cervical back: Normal range of motion and neck supple.     Neurological: She is alert and oriented to person, place, and time. She has normal strength. GCS score is 15. GCS eye subscore is 4. GCS verbal subscore is 5. GCS motor subscore is 6.   Skin: Skin is warm and dry. Capillary refill takes less than 2 seconds. No rash noted.   Psychiatric:  She has a normal mood and affect. Her behavior is normal.         ED Course   Procedures  Labs Reviewed   CBC W/ AUTO DIFFERENTIAL - Abnormal; Notable for the following components:       Result Value    RBC 3.72 (*)     Hemoglobin 11.7 (*)      (*)     MCH 31.5 (*)     MCHC 30.7 (*)     RDW 16.0 (*)     Platelets 499 (*)     Eos # 0.6 (*)     Eosinophil % 9.3 (*)     All other components within normal limits   COMPREHENSIVE METABOLIC PANEL - Abnormal; Notable for the following components:    Glucose 112 (*)     BUN 25 (*)     Creatinine 6.0 (*)     Total Protein 8.6 (*)     eGFR 6.5 (*)     All other components within normal limits   TROPONIN I HIGH SENSITIVITY - Abnormal; Notable for the following components:    Troponin I High Sensitivity 61.4 (*)     All other components within normal limits   LACTIC ACID, PLASMA - Abnormal; Notable for the following components:    Lactate (Lactic Acid) 2.2 (*)     All other components within normal limits   B-TYPE NATRIURETIC PEPTIDE - Abnormal; Notable for the following components:    BNP 2,284 (*)     All other components within normal limits   CULTURE, BLOOD   CULTURE, BLOOD   MAGNESIUM   IRON AND TIBC   FERRITIN   URINALYSIS, REFLEX TO URINE CULTURE   PROCALCITONIN   LACTIC ACID, PLASMA   TSH          Imaging Results              X-Ray Chest AP Portable (Final result)  Result time 01/04/24 11:39:36      Final result by Martin Woody MD (01/04/24 11:39:36)                   Narrative:    Chest single view    CLINICAL DATA: Shortness of breath, chest pain    FINDINGS: AP view is compared to December 20. Heart size is normal. The thoracic aorta is calcified. No infiltrates or effusions are identified. No acute osseous abnormality is demonstrated.    IMPRESSION:  1. No acute radiographic abnormalities.    Electronically signed by:  Martin Woody MD  01/04/2024 11:39 AM [facility] Workstation: [id number]                                     Medications   diltiaZEM 100  mg in dextrose 5% 100 mL IVPB (ready to mix) (non-titrating) (0 mg/hr Intravenous Paused 1/4/24 1413)   epoetin rosy-epbx injection 4,990 Units (has no administration in time range)   sodium chloride 0.9% flush 10 mL (has no administration in time range)   naloxone 0.4 mg/mL injection 0.02 mg (has no administration in time range)   glucose chewable tablet 16 g (has no administration in time range)   glucose chewable tablet 24 g (has no administration in time range)   dextrose 50% injection 12.5 g (has no administration in time range)   dextrose 50% injection 25 g (has no administration in time range)   glucagon (human recombinant) injection 1 mg (has no administration in time range)   acetaminophen tablet 650 mg (has no administration in time range)   polyethylene glycol packet 17 g (has no administration in time range)   ondansetron injection 4 mg (has no administration in time range)   melatonin tablet 6 mg (has no administration in time range)   albuterol-ipratropium 2.5 mg-0.5 mg/3 mL nebulizer solution 3 mL (has no administration in time range)   0.9%  NaCl infusion (0 mLs Intravenous Stopped 1/4/24 1159)     Medical Decision Making  Laboratory evaluation reviewed patient placed on Cardizem drip at arrival emergency department with improvement in her heart rate she was given a 500 cc fluid bolus prior to institution of Cardizem with improvement in her blood pressure symptoms significantly better at this time I have discussed patient's findings with Ms. Brayden sibley I have reviewed patient's laboratory findings radiographic findings will admit patient for further evaluation and treatment    Amount and/or Complexity of Data Reviewed  Labs: ordered. Decision-making details documented in ED Course.  Radiology: ordered. Decision-making details documented in ED Course.  ECG/medicine tests: ordered. Decision-making details documented in ED Course.    Risk  Prescription drug management.  Decision regarding  hospitalization.              Attending Attestation:         Attending Critical Care:   Critical Care Times:   Direct Patient Care (initial evaluation, reassessments, and time considering the case)................................................................15 minutes.   Additional History from reviewing old medical records or taking additional history from the family, EMS, PCP, etc.......................5 minutes.   Ordering, Reviewing, and Interpreting Diagnostic Studies...............................................................................................................8 minutes.   Documentation..................................................................................................................................................................................5 minutes.   Consultation with other Physicians. .................................................................................................................................................6 minutes.   ==============================================================  Total Critical Care Time - exclusive of procedural time: 39 minutes.  ==============================================================                                 Clinical Impression:  Final diagnoses:  [I95.9] Hypotension  [I48.91] Atrial fibrillation with RVR  [I48.91] A-fib          ED Disposition Condition    Observation                 Sridhar Haley MD  01/04/24 9523

## 2024-01-04 NOTE — ASSESSMENT & PLAN NOTE
Lactic 2.2, afebrile without leukocytosis   IV fluids given in ED, will give conservatively and only as necessary due to volume overload and ESRD  Chest xray normal, procal pending, will trend lactic

## 2024-01-04 NOTE — CONSULTS
INPATIENT NEPHROLOGY CONSULT   [facility]    Patient Name: Tyra Isaac  Date: 01/04/2024    Reason for consultation: ESRD    Chief Complaint:   Chief Complaint   Patient presents with    Vomiting    Nausea    Shortness of Breath    Chest Pain       History of Present Illness:  84 y/o F with ESRD on HD MWF who p/w PMD office with n/v, found to be hypotensive with AF with RVR, currently on cardizem gtt in ER, awaiting admission, looks frail/tired, had HD yest with some breathing difficulty she reports, but completed treatment, no exac/reliev factors, consulted for dialysis.    Interval History:  1/4- on cardizem- -140, CXR clear    Plan of Care:    Assessment:  ESRD on HD MWF  Chronic hypotension- 7/2023 echo with pEF, no DD, MR, pulm HTN  AF with RVR- trop leak, BNP > 2000 (worse compared to prior)  Hypokalemia  Lactic acidosis   SHPT  Anemia of CKD    Plan:    - ordered HD MWF  - resume midodrine  - on cardizem  - attempt 2-3L UF tomorrow- CXR clear but BNP up- hypervolemia can exacerbate AF  - hypokalemia can also exacerbate AF- adjust dialysate- Mg is replete  - trend lactate  - check phos in AM  - ordered BRAYAN with HD    Thank you for allowing us to participate in this patient's care. We will continue to follow.    Vital Signs:  Temp Readings from Last 3 Encounters:   01/04/24 97.5 °F (36.4 °C) (Oral)   12/21/23 98.6 °F (37 °C) (Oral)   12/15/23 98.1 °F (36.7 °C)       Pulse Readings from Last 3 Encounters:   01/04/24 (!) 142   01/04/24 109   12/21/23 60       BP Readings from Last 3 Encounters:   01/04/24 110/66   01/04/24 128/72   12/21/23 (!) 112/41       Weight:  Wt Readings from Last 3 Encounters:   01/04/24 49.9 kg (110 lb)   01/04/24 45.4 kg (100 lb)   12/20/23 48.5 kg (106 lb 14.8 oz)       Past Medical & Surgical History:  Past Medical History:   Diagnosis Date    A-fib     Anxiety     Depression     Disorder of kidney and ureter     Encephalopathy acute 1/1/2018    End  stage kidney disease 6/17/2017    Gout     Hyperlipidemia     Hypertension     Moderate episode of recurrent major depressive disorder 1/17/2018    Nephropathy hypertensive, 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    Osteopenia of multiple sites 3/9/2018    Based upon bone density measurements. Patient also has chronic kidney disease.    Stroke 11/2016       Past Surgical History:   Procedure Laterality Date    ANGIOGRAM, CORONARY, WITH LEFT HEART CATHETERIZATION N/A 2/7/2022    Procedure: Angiogram, Coronary, with Left Heart Cath;  Surgeon: Gino Leal MD;  Location: [facility] CATH/EP LAB;  Service: Cardiology;  Laterality: N/A;    CARDIAC SURGERY      stents    EYE SURGERY      WRIST SURGERY         Past Social History:  Social History     Socioeconomic History    Marital status:      Spouse name: Aria Isaac    Number of children: 3   Occupational History    Occupation: Not working   Tobacco Use    Smoking status: Never    Smokeless tobacco: Never   Substance and Sexual Activity    Alcohol use: No    Drug use: No    Sexual activity: Not Currently     Social Determinants of Health     Financial Resource Strain: Medium Risk (6/15/2022)    Overall Financial Resource Strain (CARDIA)     Difficulty of Paying Living Expenses: Somewhat hard   Food Insecurity: No Food Insecurity (6/15/2022)    Hunger Vital Sign     Worried About Running Out of Food in the Last Year: Never true     Ran Out of Food in the Last Year: Never true   Transportation Needs: Unmet Transportation Needs (3/29/2023)    PRAPARE - Transportation     Lack of Transportation (Medical): Yes     Lack of Transportation (Non-Medical): No   Physical Activity: Inactive (6/15/2022)    Exercise Vital Sign     Days of Exercise per Week: 0 days     Minutes of Exercise per Session: 0 min   Stress: Stress Concern Present  (6/15/2022)    [employer] [place] [employer] - Occupational Stress Questionnaire     Feeling of Stress : To some extent   Social Connections: Moderately Isolated (6/15/2022)    Social Connection and Isolation Panel [NHANES]     Frequency of Communication with Friends and Family: Three times a week     Frequency of Social Gatherings with Friends and Family: Three times a week     Attends Yarsanism Services: 1 to 4 times per year     Active Member of Clubs or Organizations: No     Attends Club or Organization Meetings: Never     Marital Status:    Housing Stability: Low Risk  (6/15/2022)    Housing Stability Vital Sign     Unable to Pay for Housing in the Last Year: No     Number of Places Lived in the Last Year: 1     Unstable Housing in the Last Year: No       Medications:  Scheduled Meds:  Continuous Infusions:   dilTIAZem 5 mg/hr (01/04/24 1205)     PRN Meds:.  No current facility-administered medications on file prior to encounter.     Current Outpatient Medications on File Prior to Encounter   Medication Sig Dispense Refill    atorvastatin (LIPITOR) 40 MG tablet Take 40 mg by mouth once daily.      b complex vitamins tablet Take 1 tablet by mouth once daily.      dorzolamide-timolol 2-0.5% (COSOPT) 22.3-6.8 mg/mL ophthalmic solution Place 1 drop into both eyes 2 (two) times daily.      dronedarone (MULTAQ) 400 mg Tab Take 1 tablet (400 mg total) by mouth 2 (two) times daily with meals. 180 tablet 0    ELIQUIS 2.5 mg Tab Take 2.5 mg by mouth 2 (two) times daily.      latanoprost 0.005 % ophthalmic solution Place 1 drop into both eyes every evening.      levalbuterol (XOPENEX) 0.63 mg/3 mL nebulizer solution SMARTSIG:Via Inhaler      loperamide (IMODIUM A-D) 2 mg Tab Take 1 tablet (2 mg total) by mouth 4 (four) times daily as needed (diarrhea). 3 tablet 0    midodrine (PROAMATINE) 10 MG tablet Take 1 tablet (10 mg total) by mouth 3 (three) times daily with meals. 270 tablet 0    ondansetron  (ZOFRAN) 4 MG tablet Take 1 tablet (4 mg total) by mouth every 8 (eight) hours as needed for Nausea. 15 tablet 0    senna-docusate 8.6-50 mg (PERICOLACE) 8.6-50 mg per tablet Take 1 tablet by mouth 2 (two) times daily as needed for Constipation. 30 tablet 0       Allergies:  Cyclobenzaprine, Fish containing products, Peanut, and Tramadol    Past Family History:  Reviewed; refer to Hospitalist Admission Note    Review of Systems:  Review of Systems - All 14 systems reviewed and negative, except as noted in HPI    Physical Exam:  General Appearance:    NAD, AAO x 3, cooperative, appears stated age   Head:    Normocephalic, atraumatic   Eyes:    PER, EOMI, and conjunctiva/sclera clear bilaterally       Mouth:   Moist mucus membranes, no thrush or oral lesions,       normal dentition   Back:     No CVA tenderness   Lungs:     Clear to auscultation bilaterally, no wheezes, crackles,           rales or rhonchi, symmetric air movement, respirations unlabored   Chest wall:    No tenderness or deformity   Heart:    Irreg irreg, S1 and S2 normal, no murmur, rub   or gallop   Abdomen:     Soft, non-tender, non-distended, bowel sounds active all four   quadrants, no RT or guarding, no masses, no organomegaly   Extremities:   Warm and well perfused, distal pulses are intact, no             cyanosis or peripheral edema   MSK:   No joint or muscle swelling, tenderness or deformity   Skin:   Skin color, texture, turgor normal, no rashes or lesions   Neurologic/Psychiatric:   CNII-XII intact, normal strength and sensation       throughout, no asterixis; normal affect, memory, judgement     and insight      Results:  Lab Results   Component Value Date     01/04/2024    K 3.6 01/04/2024    CL 96 01/04/2024    CO2 29 01/04/2024    BUN 25 (H) 01/04/2024    CREATININE 6.0 (H) 01/04/2024    CALCIUM 9.7 01/04/2024    ANIONGAP 15 01/04/2024    ESTGFRAFRICA 4.7 (A) 02/08/2022    EGFRNONAA 4.0 (A) 02/08/2022       Lab Results    Component Value Date    CALCIUM 9.7 01/04/2024    PHOS 2.9 12/14/2023       Recent Labs   Lab 01/04/24  1109   WBC 6.26   RBC 3.72*   HGB 11.7*   HCT 38.1   *   *   MCH 31.5*   MCHC 30.7*       I have personally reviewed pertinent radiological imaging and reports.    I have spent > 70 minutes providing care for this patient for the above diagnoses. These services have included chart/data/imaging review, evaluation, exam, formulation of plan, , note preparation, and discussions with staff involved in this patient's care.    Rose Maynard MD MPH  [facility]  [address] [place] [id number]  [phone] (p)  [phone] (f)

## 2024-01-05 ENCOUNTER — CLINICAL SUPPORT (OUTPATIENT)
Dept: CARDIOLOGY | Facility: HOSPITAL | Age: 84
DRG: 308 | End: 2024-01-05
Attending: EMERGENCY MEDICINE
Payer: MEDICARE

## 2024-01-05 VITALS — HEIGHT: 66 IN | BODY MASS INDEX: 17.68 KG/M2 | WEIGHT: 110 LBS

## 2024-01-05 LAB
ALBUMIN SERPL BCP-MCNC: 3.4 G/DL (ref 3.5–5.2)
ALP SERPL-CCNC: 45 U/L (ref 55–135)
ALT SERPL W/O P-5'-P-CCNC: 9 U/L (ref 10–44)
ANION GAP SERPL CALC-SCNC: 10 MMOL/L (ref 8–16)
AORTIC ROOT ANNULUS: 2.8 CM
AORTIC VALVE CUSP SEPERATION: 0.7 CM
AST SERPL-CCNC: 21 U/L (ref 10–40)
AV INDEX (PROSTH): 0.62
AV MEAN GRADIENT: 5 MMHG
AV PEAK GRADIENT: 10 MMHG
AV VALVE AREA BY VELOCITY RATIO: 1.34 CM²
AV VALVE AREA: 1.41 CM²
AV VELOCITY RATIO: 0.59
BASOPHILS # BLD AUTO: 0.03 K/UL (ref 0–0.2)
BASOPHILS NFR BLD: 0.5 % (ref 0–1.9)
BILIRUB SERPL-MCNC: 0.6 MG/DL (ref 0.1–1)
BSA FOR ECHO PROCEDURE: 1.52 M2
BUN SERPL-MCNC: 31 MG/DL (ref 8–23)
CALCIUM SERPL-MCNC: 8.1 MG/DL (ref 8.7–10.5)
CHLORIDE SERPL-SCNC: 102 MMOL/L (ref 95–110)
CO2 SERPL-SCNC: 30 MMOL/L (ref 23–29)
CREAT SERPL-MCNC: 7.4 MG/DL (ref 0.5–1.4)
CV ECHO LV RWT: 0.54 CM
DIFFERENTIAL METHOD BLD: ABNORMAL
DOP CALC AO PEAK VEL: 1.56 M/S
DOP CALC AO VTI: 36.2 CM
DOP CALC LVOT AREA: 2.3 CM2
DOP CALC LVOT DIAMETER: 1.7 CM
DOP CALC LVOT PEAK VEL: 0.92 M/S
DOP CALC LVOT STROKE VOLUME: 51.04 CM3
DOP CALC MV VTI: 71.1 CM
DOP CALCLVOT PEAK VEL VTI: 22.5 CM
E WAVE DECELERATION TIME: 254 MSEC
E/A RATIO: 2.3
E/E' RATIO: 29.38 M/S
ECHO LV POSTERIOR WALL: 1.08 CM (ref 0.6–1.1)
EOSINOPHIL # BLD AUTO: 0.6 K/UL (ref 0–0.5)
EOSINOPHIL NFR BLD: 10.9 % (ref 0–8)
ERYTHROCYTE [DISTWIDTH] IN BLOOD BY AUTOMATED COUNT: 15.9 % (ref 11.5–14.5)
EST. GFR  (NO RACE VARIABLE): 5.1 ML/MIN/1.73 M^2
FRACTIONAL SHORTENING: 34 % (ref 28–44)
GLUCOSE SERPL-MCNC: 70 MG/DL (ref 70–110)
HCT VFR BLD AUTO: 29.2 % (ref 37–48.5)
HGB BLD-MCNC: 9 G/DL (ref 12–16)
IMM GRANULOCYTES # BLD AUTO: 0.01 K/UL (ref 0–0.04)
IMM GRANULOCYTES NFR BLD AUTO: 0.2 % (ref 0–0.5)
INTERVENTRICULAR SEPTUM: 0.91 CM (ref 0.6–1.1)
IVC DIAMETER: 2.19 CM
LEFT INTERNAL DIMENSION IN SYSTOLE: 2.61 CM (ref 2.1–4)
LEFT VENTRICLE DIASTOLIC VOLUME INDEX: 44.39 ML/M2
LEFT VENTRICLE DIASTOLIC VOLUME: 68.8 ML
LEFT VENTRICLE MASS INDEX: 80 G/M2
LEFT VENTRICLE SYSTOLIC VOLUME INDEX: 16 ML/M2
LEFT VENTRICLE SYSTOLIC VOLUME: 24.8 ML
LEFT VENTRICULAR INTERNAL DIMENSION IN DIASTOLE: 3.97 CM (ref 3.5–6)
LEFT VENTRICULAR MASS: 124.68 G
LV LATERAL E/E' RATIO: 26.11 M/S
LV SEPTAL E/E' RATIO: 33.57 M/S
LVOT MG: 2 MMHG
LVOT MV: 0.62 CM/S
LYMPHOCYTES # BLD AUTO: 1.5 K/UL (ref 1–4.8)
LYMPHOCYTES NFR BLD: 26.2 % (ref 18–48)
MAGNESIUM SERPL-MCNC: 2.1 MG/DL (ref 1.6–2.6)
MCH RBC QN AUTO: 31.6 PG (ref 27–31)
MCHC RBC AUTO-ENTMCNC: 30.8 G/DL (ref 32–36)
MCV RBC AUTO: 103 FL (ref 82–98)
MONOCYTES # BLD AUTO: 1.2 K/UL (ref 0.3–1)
MONOCYTES NFR BLD: 21.6 % (ref 4–15)
MV MEAN GRADIENT: 7 MMHG
MV PEAK A VEL: 1.02 M/S
MV PEAK E VEL: 2.35 M/S
MV PEAK GRADIENT: 20 MMHG
MV VALVE AREA BY CONTINUITY EQUATION: 0.72 CM2
NEUTROPHILS # BLD AUTO: 2.3 K/UL (ref 1.8–7.7)
NEUTROPHILS NFR BLD: 40.6 % (ref 38–73)
NRBC BLD-RTO: 0 /100 WBC
PHOSPHATE SERPL-MCNC: 4.8 MG/DL (ref 2.7–4.5)
PISA MRMAX VEL: 4.52 M/S
PISA TR MAX VEL: 3.1 M/S
PLATELET # BLD AUTO: 388 K/UL (ref 150–450)
PMV BLD AUTO: 9.4 FL (ref 9.2–12.9)
POTASSIUM SERPL-SCNC: 4.5 MMOL/L (ref 3.5–5.1)
PROT SERPL-MCNC: 6.3 G/DL (ref 6–8.4)
PV MV: 0.59 M/S
PV PEAK GRADIENT: 3 MMHG
PV PEAK VELOCITY: 0.85 M/S
RA PRESSURE ESTIMATED: 3 MMHG
RBC # BLD AUTO: 2.85 M/UL (ref 4–5.4)
RV TB RVSP: 6 MMHG
RV TISSUE DOPPLER FREE WALL SYSTOLIC VELOCITY 1 (APICAL 4 CHAMBER VIEW): 11.5 CM/S
SODIUM SERPL-SCNC: 142 MMOL/L (ref 136–145)
TDI LATERAL: 0.09 M/S
TDI SEPTAL: 0.07 M/S
TDI: 0.08 M/S
TR MAX PG: 38 MMHG
TRICUSPID ANNULAR PLANE SYSTOLIC EXCURSION: 2.38 CM
TROPONIN I SERPL HS-MCNC: 62.7 PG/ML (ref 0–14.9)
TV REST PULMONARY ARTERY PRESSURE: 41 MMHG
WBC # BLD AUTO: 5.69 K/UL (ref 3.9–12.7)
Z-SCORE OF LEFT VENTRICULAR DIMENSION IN END DIASTOLE: -1.22
Z-SCORE OF LEFT VENTRICULAR DIMENSION IN END SYSTOLE: -0.51

## 2024-01-05 PROCEDURE — 94799 UNLISTED PULMONARY SVC/PX: CPT

## 2024-01-05 PROCEDURE — G0378 HOSPITAL OBSERVATION PER HR: HCPCS

## 2024-01-05 PROCEDURE — 99900035 HC TECH TIME PER 15 MIN (STAT)

## 2024-01-05 PROCEDURE — 99900031 HC PATIENT EDUCATION (STAT)

## 2024-01-05 PROCEDURE — 25000003 PHARM REV CODE 250: Performed by: PHYSICAL THERAPY ASSISTANT

## 2024-01-05 PROCEDURE — 85025 COMPLETE CBC W/AUTO DIFF WBC: CPT | Performed by: PHYSICAL THERAPY ASSISTANT

## 2024-01-05 PROCEDURE — 90935 HEMODIALYSIS ONE EVALUATION: CPT

## 2024-01-05 PROCEDURE — 84484 ASSAY OF TROPONIN QUANT: CPT | Performed by: PHYSICAL THERAPY ASSISTANT

## 2024-01-05 PROCEDURE — 25000242 PHARM REV CODE 250 ALT 637 W/ HCPCS: Performed by: PHYSICAL THERAPY ASSISTANT

## 2024-01-05 PROCEDURE — 36415 COLL VENOUS BLD VENIPUNCTURE: CPT | Performed by: PHYSICAL THERAPY ASSISTANT

## 2024-01-05 PROCEDURE — 93306 TTE W/DOPPLER COMPLETE: CPT | Mod: 26,,, | Performed by: INTERNAL MEDICINE

## 2024-01-05 PROCEDURE — 84100 ASSAY OF PHOSPHORUS: CPT | Performed by: PHYSICAL THERAPY ASSISTANT

## 2024-01-05 PROCEDURE — 80053 COMPREHEN METABOLIC PANEL: CPT | Performed by: PHYSICAL THERAPY ASSISTANT

## 2024-01-05 PROCEDURE — 94761 N-INVAS EAR/PLS OXIMETRY MLT: CPT

## 2024-01-05 PROCEDURE — 94640 AIRWAY INHALATION TREATMENT: CPT | Mod: XB

## 2024-01-05 PROCEDURE — 83735 ASSAY OF MAGNESIUM: CPT | Performed by: PHYSICAL THERAPY ASSISTANT

## 2024-01-05 PROCEDURE — 93306 TTE W/DOPPLER COMPLETE: CPT

## 2024-01-05 RX ADMIN — MIDODRINE HYDROCHLORIDE 10 MG: 2.5 TABLET ORAL at 09:01

## 2024-01-05 RX ADMIN — APIXABAN 2.5 MG: 2.5 TABLET, FILM COATED ORAL at 08:01

## 2024-01-05 RX ADMIN — DORZOLAMIDE HYDROCHLORIDE AND TIMOLOL MALEATE 1 DROP: 20; 5 SOLUTION/ DROPS OPHTHALMIC at 09:01

## 2024-01-05 RX ADMIN — ATORVASTATIN CALCIUM 40 MG: 40 TABLET, FILM COATED ORAL at 08:01

## 2024-01-05 RX ADMIN — POLYETHYLENE GLYCOL 3350 17 G: 17 POWDER, FOR SOLUTION ORAL at 08:01

## 2024-01-05 RX ADMIN — IPRATROPIUM BROMIDE AND ALBUTEROL SULFATE 3 ML: 2.5; .5 SOLUTION RESPIRATORY (INHALATION) at 07:01

## 2024-01-05 RX ADMIN — IPRATROPIUM BROMIDE AND ALBUTEROL SULFATE 3 ML: 2.5; .5 SOLUTION RESPIRATORY (INHALATION) at 01:01

## 2024-01-05 RX ADMIN — LATANOPROST 1 DROP: 50 SOLUTION OPHTHALMIC at 09:01

## 2024-01-05 RX ADMIN — IPRATROPIUM BROMIDE AND ALBUTEROL SULFATE 3 ML: 2.5; .5 SOLUTION RESPIRATORY (INHALATION) at 08:01

## 2024-01-05 RX ADMIN — DRONEDARONE 400 MG: 400 TABLET, FILM COATED ORAL at 08:01

## 2024-01-05 RX ADMIN — APIXABAN 2.5 MG: 2.5 TABLET, FILM COATED ORAL at 09:01

## 2024-01-05 NOTE — CARE UPDATE
01/05/24 0755   Patient Assessment/Suction   Level of Consciousness (AVPU) alert   Respiratory Effort Normal;Unlabored   Expansion/Accessory Muscles/Retractions no use of accessory muscles   All Lung Fields Breath Sounds clear   PRE-TX-O2   Device (Oxygen Therapy) room air   SpO2 100 %   Pulse Oximetry Type Continuous   $ Pulse Oximetry - Multiple Charge Pulse Oximetry - Multiple   Pulse 66   Resp (!) 24   Aerosol Therapy   $ Aerosol Therapy Charges Aerosol Treatment   Daily Review of Necessity (SVN) completed   Respiratory Treatment Status (SVN) given   Treatment Route (SVN) mask;oxygen   Patient Position (SVN) HOB elevated   Post Treatment Assessment (SVN) breath sounds improved   Signs of Intolerance (SVN) none   Breath Sounds Post-Respiratory Treatment   Throughout All Fields Post-Treatment All Fields   Throughout All Fields Post-Treatment aeration increased   Post-treatment Heart Rate (beats/min) 59   Post-treatment Resp Rate (breaths/min) 18   Respiratory Evaluation   $ Care Plan Tech Time 15 min   $ Eval/Re-eval Charges Re-evaluation

## 2024-01-05 NOTE — RESPIRATORY THERAPY
01/04/24 2003   Patient Assessment/Suction   Level of Consciousness (AVPU) alert   Respiratory Effort Normal;Unlabored   Expansion/Accessory Muscles/Retractions no retractions;no use of accessory muscles   All Lung Fields Breath Sounds clear   PRE-TX-O2   Device (Oxygen Therapy) room air   SpO2 100 %   Pulse 71   Resp 15   BP (!) 141/64   Aerosol Therapy   $ Aerosol Therapy Charges Aerosol Treatment   Daily Review of Necessity (SVN) completed   Respiratory Treatment Status (SVN) given   Treatment Route (SVN) mask;oxygen   Patient Position (SVN) HOB elevated   Post Treatment Assessment (SVN) increased aeration   Signs of Intolerance (SVN) none   Breath Sounds Post-Respiratory Treatment   Throughout All Fields Post-Treatment All Fields   Throughout All Fields Post-Treatment aeration increased   Post-treatment Heart Rate (beats/min) 70   Post-treatment Resp Rate (breaths/min) 15   Education   $ Education Bronchodilator;15 min   Respiratory Evaluation   $ Care Plan Tech Time 15 min   $ Eval/Re-eval Charges Evaluation

## 2024-01-05 NOTE — PROGRESS NOTES
INPATIENT NEPHROLOGY CONSULT   [facility]    Patient Name: Tyra Isaac  Date: 01/05/2024    Reason for consultation: ESRD    Chief Complaint:   Chief Complaint   Patient presents with    Vomiting    Nausea    Shortness of Breath    Chest Pain     History of Present Illness:  82 y/o F with ESRD on HD MWF who p/w PMD office with n/v, found to be hypotensive with AF with RVR, currently on cardizem gtt in ER, awaiting admission, looks frail/tired, had HD yest with some breathing difficulty she reports, but completed treatment, no exac/reliev factors, consulted for dialysis.    Interval History:  1/4- on cardizem- -140, CXR clear    1/5 VSS. Seen on HD today.    Plan of Care:    ESRD on HD MWF  Chronic hypotension- 7/2023 echo with pEF, no DD, MR, pulm HTN  AF with RVR- trop leak, BNP > 2000 (worse compared to prior)  Hypokalemia  Lactic acidosis   Secondary HPT  Anemia of CKD    Plan:    - ordered HD MWF  - resume midodrine  - on cardizem  - attempt 2-3L UF tomorrow- CXR clear but BNP up- hypervolemia can exacerbate AF  - hypokalemia can also exacerbate AF- adjust dialysate- Mg is replete  - trend lactate  - ordered BRAYAN with HD    Thank you for allowing us to participate in this patient's care. We will continue to follow.    Vital Signs:  Temp Readings from Last 3 Encounters:   01/04/24 97.5 °F (36.4 °C) (Oral)   12/21/23 98.6 °F (37 °C) (Oral)   12/15/23 98.1 °F (36.7 °C)       Pulse Readings from Last 3 Encounters:   01/05/24 63   01/04/24 109   12/21/23 60       BP Readings from Last 3 Encounters:   01/05/24 136/65   01/04/24 128/72   12/21/23 (!) 112/41       Weight:  Wt Readings from Last 3 Encounters:   01/04/24 49.9 kg (110 lb)   01/05/24 49.9 kg (110 lb 0.2 oz)   01/04/24 45.4 kg (100 lb)       Past Medical & Surgical History:  Past Medical History:   Diagnosis Date    A-fib     Anxiety     Depression     Disorder of kidney and ureter     Encephalopathy acute 1/1/2018    End  stage kidney disease 6/17/2017    Gout     Hyperlipidemia     Hypertension     Moderate episode of recurrent major depressive disorder 1/17/2018    Nephropathy hypertensive, 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    Osteopenia of multiple sites 3/9/2018    Based upon bone density measurements. Patient also has chronic kidney disease.    Stroke 11/2016       Past Surgical History:   Procedure Laterality Date    ANGIOGRAM, CORONARY, WITH LEFT HEART CATHETERIZATION N/A 2/7/2022    Procedure: Angiogram, Coronary, with Left Heart Cath;  Surgeon: Gino Leal MD;  Location: [facility] CATH/EP LAB;  Service: Cardiology;  Laterality: N/A;    CARDIAC SURGERY      stents    EYE SURGERY      WRIST SURGERY         Past Social History:  Social History     Socioeconomic History    Marital status:      Spouse name: Aria Isaac    Number of children: 3   Occupational History    Occupation: Not working   Tobacco Use    Smoking status: Never    Smokeless tobacco: Never   Substance and Sexual Activity    Alcohol use: No    Drug use: No    Sexual activity: Not Currently     Social Determinants of Health     Financial Resource Strain: Medium Risk (6/15/2022)    Overall Financial Resource Strain (CARDIA)     Difficulty of Paying Living Expenses: Somewhat hard   Food Insecurity: No Food Insecurity (6/15/2022)    Hunger Vital Sign     Worried About Running Out of Food in the Last Year: Never true     Ran Out of Food in the Last Year: Never true   Transportation Needs: Unmet Transportation Needs (3/29/2023)    PRAPARE - Transportation     Lack of Transportation (Medical): Yes     Lack of Transportation (Non-Medical): No   Physical Activity: Inactive (6/15/2022)    Exercise Vital Sign     Days of Exercise per Week: 0 days     Minutes of Exercise per Session: 0 min   Stress: Stress Concern Present  (6/15/2022)    [employer] - Occupational Stress Questionnaire     Feeling of Stress : To some extent   Social Connections: Moderately Isolated (6/15/2022)    Social Connection and Isolation Panel [NHANES]     Frequency of Communication with Friends and Family: Three times a week     Frequency of Social Gatherings with Friends and Family: Three times a week     Attends [employer] Services: 1 to 4 times per year     Active Member of Clubs or Organizations: No     Attends Club or Organization Meetings: Never     Marital Status:    Housing Stability: Low Risk  (6/15/2022)    Housing Stability Vital Sign     Unable to Pay for Housing in the Last Year: No     Number of Places Lived in the Last Year: 1     Unstable Housing in the Last Year: No       Medications:  Scheduled Meds:   albuterol-ipratropium  3 mL Nebulization Q6H WAKE    apixaban  2.5 mg Oral BID    atorvastatin  40 mg Oral Daily    dorzolamide-timolol 2-0.5%  1 drop Both Eyes BID    dronedarone  400 mg Oral BID WM    epoetin rosy-epbx  100 Units/kg Subcutaneous Every Mon, Wed, Fri    latanoprost  1 drop Both Eyes QHS    midodrine  10 mg Oral TID    polyethylene glycol  17 g Oral Daily     Continuous Infusions:      PRN Meds:.  No current facility-administered medications on file prior to encounter.     Current Outpatient Medications on File Prior to Encounter   Medication Sig Dispense Refill    atorvastatin (LIPITOR) 40 MG tablet Take 40 mg by mouth once daily.      b complex vitamins tablet Take 1 tablet by mouth once daily.      dorzolamide-timolol 2-0.5% (COSOPT) 22.3-6.8 mg/mL ophthalmic solution Place 1 drop into both eyes 2 (two) times daily.      dronedarone (MULTAQ) 400 mg Tab Take 1 tablet (400 mg total) by mouth 2 (two) times daily with meals. 180 tablet 0    ELIQUIS 2.5 mg Tab Take 2.5 mg by mouth 2 (two) times daily.      latanoprost 0.005 % ophthalmic solution Place 1 drop into both eyes every evening.       levalbuterol (XOPENEX) 0.63 mg/3 mL nebulizer solution Take 1 ampule by nebulization every 4 to 6 hours as needed for Wheezing or Shortness of Breath.      loperamide (IMODIUM A-D) 2 mg Tab Take 1 tablet (2 mg total) by mouth 4 (four) times daily as needed (diarrhea). 3 tablet 0    midodrine (PROAMATINE) 10 MG tablet Take 1 tablet (10 mg total) by mouth 3 (three) times daily with meals. 270 tablet 0    ondansetron (ZOFRAN) 4 MG tablet Take 1 tablet (4 mg total) by mouth every 8 (eight) hours as needed for Nausea. 15 tablet 0    predniSONE (DELTASONE) 20 MG tablet Take 20 mg by mouth once daily.      senna-docusate 8.6-50 mg (PERICOLACE) 8.6-50 mg per tablet Take 1 tablet by mouth 2 (two) times daily as needed for Constipation. 30 tablet 0       Allergies:  Cyclobenzaprine, Fish containing products, Peanut, and Tramadol    Past Family History:  Reviewed; refer to Hospitalist Admission Note    Review of Systems:  Review of Systems - All 14 systems reviewed and negative, except as noted in HPI    Physical Exam:  General Appearance:    NAD, AAO x 3, cooperative, appears stated age   Head:    Normocephalic, atraumatic   Eyes:    PER, EOMI, and conjunctiva/sclera clear bilaterally       Mouth:   Moist mucus membranes, no thrush or oral lesions,       normal dentition   Back:     No CVA tenderness   Lungs:     Clear to auscultation bilaterally, no wheezes, crackles,           rales or rhonchi, symmetric air movement, respirations unlabored   Chest wall:    No tenderness or deformity   Heart:    Irreg irreg, S1 and S2 normal, no murmur, rub   or gallop   Abdomen:     Soft, non-tender, non-distended, bowel sounds active all four   quadrants, no RT or guarding, no masses, no organomegaly   Extremities:   Warm and well perfused, distal pulses are intact, no             cyanosis or peripheral edema   MSK:   No joint or muscle swelling, tenderness or deformity   Skin:   Skin color, texture, turgor normal, no rashes or lesions  "  Neurologic/Psychiatric:   CNII-XII intact, normal strength and sensation       throughout, no asterixis; normal affect, memory, judgement     and insight      Results:  Recent Labs   Lab 01/04/24  1109 01/05/24  0431    142   K 3.6 4.5   CL 96 102   CO2 29 30*   BUN 25* 31*   CREATININE 6.0* 7.4*   * 70       Recent Labs   Lab 01/04/24  1109 01/05/24  0431   CALCIUM 9.7 8.1*   ALBUMIN 4.5 3.4*   PHOS  --  4.8*   MG 2.1 2.1             No results for input(s): "POCTGLUCOSE" in the last 168 hours.    Recent Labs   Lab 01/26/22  1525   Hemoglobin A1C 5.3       Recent Labs   Lab 01/04/24  1109 01/05/24  0431   WBC 6.26 5.69   HGB 11.7* 9.0*   HCT 38.1 29.2*   * 388   * 103*   MCHC 30.7* 30.8*   MONO 13.9  0.9 21.6*  1.2*   EOSINOPHIL 9.3* 10.9*       Recent Labs   Lab 01/04/24  1109 01/05/24  0431   BILITOT 0.8 0.6   PROT 8.6* 6.3   ALBUMIN 4.5 3.4*   ALKPHOS 67 45*   ALT 16 9*   AST 27 21       Recent Labs   Lab 11/09/23 2028 12/04/23  0959 12/20/23  1247   Color, UA Yellow Yellow Yellow   Appearance, UA Clear Clear Clear   pH, UA >8.0 A 8.0 6.0   Specific [place], UA 1.015 1.015 1.015   Protein, UA 2+ A 2+ A 2+ A   Glucose, UA Trace A 1+ A Negative   Ketones, UA Negative Trace A Negative   Urobilinogen, UA 2.0-3.0 A Negative Negative   Bilirubin (UA) Negative Negative Negative   Occult Blood UA Trace A 2+ A Negative   Nitrite, UA Negative Negative Negative   RBC, UA 1 41 H 0   WBC, UA 0 2 3   Bacteria Negative Negative Rare   Hyaline Casts, UA 3 A 6 A 8 A       Recent Labs   Lab 11/10/23  1518 11/10/23  1529 12/20/23  1207   POC PH 7.395 7.391 7.390   POC PCO2 36.5 37.2 37.3   POC HCO3 22.4 L 22.6 L 22.6 L   POC PO2 54 LL 53 LL 99   POC SATURATED O2 88 87 98   POC BE -3 L -2 -2   Sample ARTERIAL ARTERIAL ARTERIAL       Microbiology Results (last 7 days)       Procedure Component Value Units Date/Time    Blood culture #1 **CANNOT BE ORDERED STAT** [[id number]] Collected: 01/04/24 1138    " Order Status: Completed Specimen: Blood from Antecubital, Left Updated: 01/04/24 1917     Blood Culture, Routine No Growth to date    Blood culture #2 **CANNOT BE ORDERED STAT** [[id number]] Collected: 01/04/24 1214    Order Status: Completed Specimen: Blood from Antecubital, Left Updated: 01/04/24 1917     Blood Culture, Routine No Growth to date           have spent > minutes providing care for this patient for the above diagnoses. These services have included chart/data/imaging review, evaluation, exam, formulation of plan, , note preparation, and discussions with staff involved in this patient's care.    Raleigh Yee., MD  [facility] [address] [address]  [phone] (p)  [phone] (f)

## 2024-01-06 PROBLEM — R79.89 ELEVATED LACTIC ACID LEVEL: Status: RESOLVED | Noted: 2024-01-04 | Resolved: 2024-01-06

## 2024-01-06 LAB
ALBUMIN SERPL BCP-MCNC: 3.3 G/DL (ref 3.5–5.2)
ALP SERPL-CCNC: 49 U/L (ref 55–135)
ALT SERPL W/O P-5'-P-CCNC: 10 U/L (ref 10–44)
ANION GAP SERPL CALC-SCNC: 7 MMOL/L (ref 8–16)
AST SERPL-CCNC: 20 U/L (ref 10–40)
BASOPHILS # BLD AUTO: 0.04 K/UL (ref 0–0.2)
BASOPHILS NFR BLD: 0.8 % (ref 0–1.9)
BILIRUB SERPL-MCNC: 0.5 MG/DL (ref 0.1–1)
BUN SERPL-MCNC: 16 MG/DL (ref 8–23)
CALCIUM SERPL-MCNC: 8 MG/DL (ref 8.7–10.5)
CHLORIDE SERPL-SCNC: 108 MMOL/L (ref 95–110)
CO2 SERPL-SCNC: 23 MMOL/L (ref 23–29)
CREAT SERPL-MCNC: 4.9 MG/DL (ref 0.5–1.4)
DIFFERENTIAL METHOD BLD: ABNORMAL
EOSINOPHIL # BLD AUTO: 0.5 K/UL (ref 0–0.5)
EOSINOPHIL NFR BLD: 9.6 % (ref 0–8)
ERYTHROCYTE [DISTWIDTH] IN BLOOD BY AUTOMATED COUNT: 16.3 % (ref 11.5–14.5)
EST. GFR  (NO RACE VARIABLE): 8.3 ML/MIN/1.73 M^2
GLUCOSE SERPL-MCNC: 90 MG/DL (ref 70–110)
HCT VFR BLD AUTO: 27.6 % (ref 37–48.5)
HGB BLD-MCNC: 8.3 G/DL (ref 12–16)
IMM GRANULOCYTES # BLD AUTO: 0.01 K/UL (ref 0–0.04)
IMM GRANULOCYTES NFR BLD AUTO: 0.2 % (ref 0–0.5)
LYMPHOCYTES # BLD AUTO: 1.2 K/UL (ref 1–4.8)
LYMPHOCYTES NFR BLD: 22.6 % (ref 18–48)
MAGNESIUM SERPL-MCNC: 2 MG/DL (ref 1.6–2.6)
MCH RBC QN AUTO: 31.4 PG (ref 27–31)
MCHC RBC AUTO-ENTMCNC: 30.1 G/DL (ref 32–36)
MCV RBC AUTO: 105 FL (ref 82–98)
MONOCYTES # BLD AUTO: 1 K/UL (ref 0.3–1)
MONOCYTES NFR BLD: 20.3 % (ref 4–15)
NEUTROPHILS # BLD AUTO: 2.4 K/UL (ref 1.8–7.7)
NEUTROPHILS NFR BLD: 46.5 % (ref 38–73)
NRBC BLD-RTO: 0 /100 WBC
PHOSPHATE SERPL-MCNC: 2.9 MG/DL (ref 2.7–4.5)
PLATELET # BLD AUTO: 342 K/UL (ref 150–450)
PMV BLD AUTO: 9.3 FL (ref 9.2–12.9)
POTASSIUM SERPL-SCNC: 4.6 MMOL/L (ref 3.5–5.1)
PROT SERPL-MCNC: 6.3 G/DL (ref 6–8.4)
RBC # BLD AUTO: 2.64 M/UL (ref 4–5.4)
SODIUM SERPL-SCNC: 138 MMOL/L (ref 136–145)
WBC # BLD AUTO: 5.13 K/UL (ref 3.9–12.7)

## 2024-01-06 PROCEDURE — 85025 COMPLETE CBC W/AUTO DIFF WBC: CPT | Performed by: PHYSICAL THERAPY ASSISTANT

## 2024-01-06 PROCEDURE — G0378 HOSPITAL OBSERVATION PER HR: HCPCS

## 2024-01-06 PROCEDURE — 94799 UNLISTED PULMONARY SVC/PX: CPT

## 2024-01-06 PROCEDURE — 25000003 PHARM REV CODE 250: Performed by: PHYSICAL THERAPY ASSISTANT

## 2024-01-06 PROCEDURE — 63600175 PHARM REV CODE 636 W HCPCS: Performed by: PHYSICAL THERAPY ASSISTANT

## 2024-01-06 PROCEDURE — 99900031 HC PATIENT EDUCATION (STAT)

## 2024-01-06 PROCEDURE — 84100 ASSAY OF PHOSPHORUS: CPT | Performed by: PHYSICAL THERAPY ASSISTANT

## 2024-01-06 PROCEDURE — 99900035 HC TECH TIME PER 15 MIN (STAT)

## 2024-01-06 PROCEDURE — 25000242 PHARM REV CODE 250 ALT 637 W/ HCPCS: Performed by: PHYSICAL THERAPY ASSISTANT

## 2024-01-06 PROCEDURE — 36415 COLL VENOUS BLD VENIPUNCTURE: CPT | Performed by: PHYSICAL THERAPY ASSISTANT

## 2024-01-06 PROCEDURE — 96375 TX/PRO/DX INJ NEW DRUG ADDON: CPT

## 2024-01-06 PROCEDURE — 94640 AIRWAY INHALATION TREATMENT: CPT | Mod: XB

## 2024-01-06 PROCEDURE — 83735 ASSAY OF MAGNESIUM: CPT | Performed by: PHYSICAL THERAPY ASSISTANT

## 2024-01-06 PROCEDURE — 80053 COMPREHEN METABOLIC PANEL: CPT | Performed by: PHYSICAL THERAPY ASSISTANT

## 2024-01-06 PROCEDURE — 94761 N-INVAS EAR/PLS OXIMETRY MLT: CPT

## 2024-01-06 RX ADMIN — LATANOPROST 1 DROP: 50 SOLUTION OPHTHALMIC at 09:01

## 2024-01-06 RX ADMIN — IPRATROPIUM BROMIDE AND ALBUTEROL SULFATE 3 ML: 2.5; .5 SOLUTION RESPIRATORY (INHALATION) at 07:01

## 2024-01-06 RX ADMIN — DORZOLAMIDE HYDROCHLORIDE AND TIMOLOL MALEATE 1 DROP: 20; 5 SOLUTION/ DROPS OPHTHALMIC at 09:01

## 2024-01-06 RX ADMIN — DRONEDARONE 400 MG: 400 TABLET, FILM COATED ORAL at 07:01

## 2024-01-06 RX ADMIN — MIDODRINE HYDROCHLORIDE 10 MG: 2.5 TABLET ORAL at 08:01

## 2024-01-06 RX ADMIN — DORZOLAMIDE HYDROCHLORIDE AND TIMOLOL MALEATE 1 DROP: 20; 5 SOLUTION/ DROPS OPHTHALMIC at 08:01

## 2024-01-06 RX ADMIN — ONDANSETRON 4 MG: 2 INJECTION INTRAMUSCULAR; INTRAVENOUS at 08:01

## 2024-01-06 RX ADMIN — MIDODRINE HYDROCHLORIDE 10 MG: 2.5 TABLET ORAL at 04:01

## 2024-01-06 RX ADMIN — IPRATROPIUM BROMIDE AND ALBUTEROL SULFATE 3 ML: 2.5; .5 SOLUTION RESPIRATORY (INHALATION) at 02:01

## 2024-01-06 RX ADMIN — ATORVASTATIN CALCIUM 40 MG: 40 TABLET, FILM COATED ORAL at 08:01

## 2024-01-06 RX ADMIN — APIXABAN 2.5 MG: 2.5 TABLET, FILM COATED ORAL at 08:01

## 2024-01-06 RX ADMIN — DRONEDARONE 400 MG: 400 TABLET, FILM COATED ORAL at 04:01

## 2024-01-06 RX ADMIN — POLYETHYLENE GLYCOL 3350 17 G: 17 POWDER, FOR SOLUTION ORAL at 08:01

## 2024-01-06 NOTE — ASSESSMENT & PLAN NOTE
Afib RVR of 120, started on diltiazem drip in ED  EKG now NSR, rate controlled, monitor on telemetry   Continue eliquis   EKG PRN for chest pain     Resolved

## 2024-01-06 NOTE — PLAN OF CARE
01/05/24 2020   Patient Assessment/Suction   Level of Consciousness (AVPU) alert   Respiratory Effort Normal;Unlabored   All Lung Fields Breath Sounds clear   Rhythm/Pattern, Respiratory pattern regular;unlabored   PRE-TX-O2   Device (Oxygen Therapy) room air   SpO2 100 %   Pulse Oximetry Type Continuous   $ Pulse Oximetry - Multiple Charge Pulse Oximetry - Multiple   Pulse 76   Resp 18   Aerosol Therapy   $ Aerosol Therapy Charges Aerosol Treatment   Daily Review of Necessity (SVN) completed   Respiratory Treatment Status (SVN) given   Treatment Route (SVN) mask;oxygen   Patient Position (SVN) Persaud's;HOB elevated   Post Treatment Assessment (SVN) breath sounds unchanged   Signs of Intolerance (SVN) none   Education   $ Education Bronchodilator;15 min   Respiratory Evaluation   $ Care Plan Tech Time 15 min

## 2024-01-06 NOTE — PROGRESS NOTES
On license of [facility] - Emergency Dept  Hospital Medicine  Progress Note    Patient Name: Tyra Isaac  MRN: [id number]  Patient Class: OP- Observation   Admission Date: 1/4/2024  Length of Stay: 0 days  Attending Physician: Humble Yee MD  Primary Care Provider: Kalpesh Goel MD        Subjective:     Principal Problem:Atrial fibrillation with rapid ventricular response        HPI:  Pt is an 83-year-old  female with history of hypertension and ESRD on MWF. Pt presents to the ED today from PCP appointment due to episode of vomiting, rapid heart rate, and hypotension. At time of episode, pt complains of mild dyspnea and chest discomfort. Pt has history of hypotension during dialysis and is on midodrine.  On arrival to ED, pt found to be hypotensive of 70/54, fluid bolus given. Pt found to be in Afib RVR in ED HR of 130 and was started on diltiazem drip. Pt has history of afib and is on chronic eliquis. Pt was recently hospitalized for hypotension and afib and was subsequently discharged to SNF. Pt was discharged from SNF 2 days ago. Pt had last dialysis session yesterday. Hemoglobin 11.7, potassium 3.6, creatinine 6.0, BNP 2284, magnesium 2.1, lactic 2.2, and troponin 61.4.  Pt recently was diagnosed with covid 12/4/23 and continues to have viral cough. Pt does endorse nebulizer treatments have been helpful. Pt denies current chest pain, SOB, nausea, abdominal pain, or heart palpitations. Pt full code.     Overview/Hospital Course:  1/5/2024  Ms Isaac is feeling better but continues to have salas and has no palpitations    Interval History: Ms Isaac has converted to NSR. Undergoing hemodialysis this date per Dr Yee    Review of Systems   Constitutional:  Positive for activity change, diaphoresis and fatigue.   HENT: Negative.     Eyes: Negative.    Respiratory:          SALAS   Cardiovascular: Negative.  Negative for palpitations.   Gastrointestinal:  Positive for abdominal  distention. Negative for abdominal pain.   Endocrine: Positive for cold intolerance.   Genitourinary:  Positive for decreased urine volume.   Musculoskeletal:  Positive for arthralgias and myalgias.   Skin: Negative.    Allergic/Immunologic: Positive for immunocompromised state.   Neurological:  Positive for weakness.   Hematological:  Bruises/bleeds easily.   Psychiatric/Behavioral:  The patient is nervous/anxious.      Objective:     Vital Signs (Most Recent):  Temp: 97.8 °F (36.6 °C) (01/05/24 1520)  Pulse: 70 (01/05/24 1800)  Resp: 19 (01/05/24 1520)  BP: (!) 118/55 (01/05/24 1800)  SpO2: 98 % (01/05/24 1520) Vital Signs (24h Range):  Temp:  [97.8 °F (36.6 °C)] 97.8 °F (36.6 °C)  Pulse:  [54-75] 70  Resp:  [15-29] 19  SpO2:  [97 %-100 %] 98 %  BP: ()/(48-98) 118/55     Weight: 49.9 kg (110 lb)  Body mass index is 17.75 kg/m².  No intake or output data in the 24 hours ending 01/05/24 1809      Physical Exam  Vitals and nursing note reviewed.   Constitutional:       General: She is not in acute distress.     Appearance: She is ill-appearing. She is not diaphoretic.   HENT:      Head: Atraumatic.      Nose: Nose normal.      Mouth/Throat:      Mouth: Mucous membranes are moist.   Eyes:      Extraocular Movements: Extraocular movements intact.      Pupils: Pupils are equal, round, and reactive to light.   Cardiovascular:      Rate and Rhythm: Normal rate and regular rhythm.   Pulmonary:      Effort: Pulmonary effort is normal.      Breath sounds: Rales present.   Abdominal:      General: Bowel sounds are normal.   Musculoskeletal:         General: Normal range of motion.      Cervical back: Normal range of motion and neck supple.   Skin:     General: Skin is warm.   Neurological:      Mental Status: She is alert and oriented to person, place, and time.   Psychiatric:      Comments: Moldly dulled affect             Significant Labs: All pertinent labs within the past 24 hours have been reviewed.  Recent Lab  Results         01/05/24  1353   01/05/24  0431        Albumin   3.4       ALP   45       ALT   9       Anion Gap   10       Ao root annulus 2.80         Ao peak paige 1.56         Ao VTI 36.20         AST   21       AV valve area 1.41         [place] by Velocity Ratio 1.34         AORTIC VALVE CUSP SEPERATION 0.70         AV mean gradient 5         AV index (prosthetic) 0.62         AV peak gradient 10         AV Velocity Ratio 0.59         Baso #   0.03       Basophil %   0.5       BILIRUBIN TOTAL   0.6  Comment: For infants and newborns, interpretation of results should be based  on gestational age, weight and in agreement with clinical  observations.    Premature Infant recommended reference ranges:  Up to 24 hours.............<8.0 mg/dL  Up to 48 hours............<12.0 mg/dL  3-5 days..................<15.0 mg/dL  6-29 days.................<15.0 mg/dL         BSA 1.52         BUN   31       Calcium   8.1       Chloride   102       CO2   30       Creatinine   7.4       Left Ventricle Relative Wall Thickness 0.54         Differential Method   Automated       E/A ratio 2.30         E/E' ratio 29.38         eGFR   5.1       Eos #   0.6       Eosinophil %   10.9       E wave deceleration time 254.00         FS 34         Glucose   70       Gran # (ANC)   2.3       Gran %   40.6       Hematocrit   29.2       Hemoglobin   9.0       Immature Grans (Abs)   0.01  Comment: Mild elevation in immature granulocytes is non specific and   can be seen in a variety of conditions including stress response,   acute inflammation, trauma and pregnancy. Correlation with other   laboratory and clinical findings is essential.         Immature Granulocytes   0.2       IVC diameter 2.19         IVSd 0.91         LVOT area 2.3         LV LATERAL E/E' RATIO 26.11         LV SEPTAL E/E' RATIO 33.57         LV EDV BP 68.80         LV Diastolic Volume Index 44.39         LVIDd 3.97         LVIDs 2.61         LV mass 124.68         LV Mass Index  80         Left Ventricular Outflow Tract Mean Gradient 2.00         Left Ventricular Outflow Tract Mean Velocity 0.62         LVOT diameter 1.70         LVOT peak romeo 0.92         LVOT stroke volume 51.04         LVOT peak VTI 22.50         LV ESV BP 24.80         LV Systolic Volume Index 16.0         Lymph #   1.5       Lymph %   26.2       Magnesium    2.1       MCH   31.6       MCHC   30.8       MCV   103       Mean e' 0.08         Mono #   1.2       Mono %   21.6       MPV   9.4       Mr max romeo 4.52         MV valve area by continuity eq 0.72         MV mean gradient 7         MV peak gradient 20         MV Peak A Romeo 1.02         MV Peak E Romeo 2.35         MV VTI 71.1         nRBC   0       Phosphorus Level   4.8       Platelet Count   388       Potassium   4.5       PROTEIN TOTAL   6.3       Pulmonary Valve Mean Velocity 0.59         PV peak gradient 3         PV PEAK VELOCITY 0.85         Posterior Wall 1.08         Est. RA pres 3         RBC   2.85       RDW   15.9       RV S' 11.50         RV TB RVSP 6         Sodium   142       TAPSE 2.38         TDI SEPTAL 0.07         TDI LATERAL 0.09         Triscuspid Valve Regurgitation Peak Gradient 38         TR Max Romeo 3.10         Troponin I High Sensitivity   62.7  Comment: Troponin results differ between methods. Do not use   results between Troponin methods interchangeably.    Alkaline Phospatase levels above 400 U/L may   cause false positive results.    Access hsTnI should not be used for patients taking   Asfotase rosy (Strensiq).  Critical result TNIHS 62.7 pg/mL called to and read back by Mingo Kinney Rn  at 05-Jan-2024 05:17 [facility].         TV resting pulmonary artery pressure 41         WBC   5.69       ZLVIDD -1.22         ZLVIDS -0.51                 Significant Imaging: I have reviewed all pertinent imaging results/findings within the past 24 hours.    Assessment/Plan:      * Atrial fibrillation with RVR with history of known paroxysmal  atrial fibrillation  Afib RVR of 120, started on diltiazem drip in ED  EKG now NSR, rate controlled, monitor on telemetry   Continue eliquis   EKG PRN for chest pain     Resolved    Elevated lactic acid level  Lactic 2.2, afebrile without leukocytosis   IV fluids given in ED, will give conservatively and only as necessary due to volume overload and ESRD  Chest xray normal, procal pending, will trend lactic       Hypotension  76/54 on arrival, fluids given in ED  Continue to monitor vitals, continue home regimen of midodrine PRN      Anemia of chronic disease  Current CBC reviewed-   Lab Results   Component Value Date    HGB 11.7 (L) 01/04/2024    HCT 38.1 01/04/2024     Monitor serial CBC and transfuse if patient becomes hemodynamically unstable, symptomatic or H/H drops below 7/21.    Elevated troponin  Troponin 61.4, no chest pain or SOB on exam   Will monitor cardiac enzymes       End stage renal disease on dialysis  Creatine elevated, nephrology consulted for completion of dialysis MWF and recs. BMP reviewed- noted Estimated Creatinine Clearance: 5.6 mL/min (A) (based on SCr of 6 mg/dL (H)). according to latest data. Based on current GFR, CKD stage is end stage.  Monitor UOP and serial BMP and adjust therapy as needed. Renally dose meds. Avoid nephrotoxic medications and procedures.    Multiple-type hyperlipidemia  Continue statin         VTE Risk Mitigation (From admission, onward)           Ordered     apixaban tablet 2.5 mg  2 times daily         01/04/24 1625     Reason for No Pharmacological VTE Prophylaxis  Once        Question:  Reasons:  Answer:  Already adequately anticoagulated on oral Anticoagulants    01/04/24 1537     IP VTE HIGH RISK PATIENT  Once         01/04/24 1537     Place sequential compression device  Until discontinued         01/04/24 1537                    Discharge Planning   ILAN:      Code Status: Full Code   Is the patient medically ready for discharge?:     Reason for patient still  in hospital (select all that apply): Treatment, Consult recommendations, and Pending disposition                     Humble Yee MD  Department of Hospital Medicine   [facility] - Emergency Dept

## 2024-01-06 NOTE — PROGRESS NOTES
[facility]  Progress Note    Patient Name: Tyra Isaac  MRN: [id number]  Patient Class: OP- Observation   Admission Date: 1/4/2024  Length of Stay: 0 days  Attending Physician: Elieser Herrera MD  Primary Care Provider: Kalpesh Goel MD        Subjective:     Principal Problem:Atrial fibrillation with rapid ventricular response        HPI:  Pt is an 83-year-old  female with history of hypertension and ESRD on MWF. Pt presents to the ED today from PCP appointment due to episode of vomiting, rapid heart rate, and hypotension. At time of episode, pt complains of mild dyspnea and chest discomfort. Pt has history of hypotension during dialysis and is on midodrine.  On arrival to ED, pt found to be hypotensive of 70/54, fluid bolus given. Pt found to be in Afib RVR in ED HR of 130 and was started on diltiazem drip. Pt has history of afib and is on chronic eliquis. Pt was recently hospitalized for hypotension and afib and was subsequently discharged to SNF. Pt was discharged from SNF 2 days ago. Pt had last dialysis session yesterday. Hemoglobin 11.7, potassium 3.6, creatinine 6.0, BNP 2284, magnesium 2.1, lactic 2.2, and troponin 61.4.  Pt recently was diagnosed with covid 12/4/23 and continues to have viral cough. Pt does endorse nebulizer treatments have been helpful. Pt denies current chest pain, SOB, nausea, abdominal pain, or heart palpitations. Pt full code.     Overview/Hospital Course:  83F with PMH ESRD, Afib, HTN, and CAD is admitted due to hypotension and Afib with RVR requiring diltiazem infusion. Home midodrine given. She converted to normal sinus rhythm and her BP stabilized. Underwent dialysis with nephrology while inpatient. Remained fatigued. Noted to have recently left SNF due to having to pay for additional days. PT/OT and CM consulted.    Interval History: Patient seen and examined. NAEON. Feels fatigued.      Objective:     Vital Signs (Most  Recent):  Temp: 97.7 °F (36.5 °C) (01/06/24 1100)  Pulse: 69 (01/06/24 1200)  Resp: 18 (01/06/24 1200)  BP: 97/69 (01/06/24 1200)  SpO2: 100 % (01/06/24 1200) Vital Signs (24h Range):  Temp:  [97.7 °F (36.5 °C)-98.6 °F (37 °C)] 97.7 °F (36.5 °C)  Pulse:  [60-76] 69  Resp:  [16-20] 18  SpO2:  [97 %-100 %] 100 %  BP: ()/(44-69) 97/69     Weight: 49.9 kg (110 lb)  Body mass index is 17.75 kg/m².    Intake/Output Summary (Last 24 hours) at 1/6/2024 1232  Last data filed at 1/6/2024 0544  Gross per 24 hour   Intake 360 ml   Output 1500 ml   Net -1140 ml         Physical Exam  Vitals reviewed.   Constitutional:       General: She is not in acute distress.     Appearance: She is underweight. She is ill-appearing (chronic).   HENT:      Head: Normocephalic and atraumatic.   Cardiovascular:      Rate and Rhythm: Normal rate and regular rhythm.   Pulmonary:      Effort: Pulmonary effort is normal. No respiratory distress.   Neurological:      General: No focal deficit present.      Mental Status: She is alert and oriented to person, place, and time. Mental status is at baseline.   Psychiatric:         Mood and Affect: Affect normal.         Behavior: Behavior normal.             Significant Labs: All pertinent labs within the past 24 hours have been reviewed.    Significant Imaging: I have reviewed all pertinent imaging results/findings within the past 24 hours.    Assessment/Plan:      * Atrial fibrillation with RVR with history of known paroxysmal atrial fibrillation  Now in NSR  S/p dilt gtt  - continue eliquis and dronedarone    Hypotension  Was acute on chronic now stable  - continue midodrine    Anemia of chronic disease  Stable. Current CBC reviewed-   Lab Results   Component Value Date    HGB 8.3 (L) 01/06/2024    HCT 27.6 (L) 01/06/2024     Monitor daily CBC and transfuse if patient becomes hemodynamically unstable, symptomatic or H/H drops below 7/21.    Elevated troponin  Chronic, no anginal symptoms  -  monitor clinically      End stage renal disease on dialysis  - HD per nephro    Debility  Recently left SNF.   - PT/OT and CM consult    Multiple-type hyperlipidemia  - Continue statin       VTE Risk Mitigation (From admission, onward)           Ordered     apixaban tablet 2.5 mg  2 times daily         01/04/24 1625     Reason for No Pharmacological VTE Prophylaxis  Once        Question:  Reasons:  Answer:  Already adequately anticoagulated on oral Anticoagulants    01/04/24 1537     IP VTE HIGH RISK PATIENT  Once         01/04/24 1537     Place sequential compression device  Until discontinued         01/04/24 1537                    Discharge Planning   ILAN:  1/8-1/9    Code Status: Full Code   Is the patient medically ready for discharge?:     Reason for patient still in hospital (select all that apply): Patient trending condition and Pending disposition  Discharge Plan A: Home Health          Transfer off stepdown unit. Looking into possible dispo options as she seems too debilitated to return home if there are any other options.        Elieser Herrera MD  Department of Hospital Medicine   [facility]

## 2024-01-06 NOTE — SUBJECTIVE & OBJECTIVE
Interval History: Ms Isaac has converted to NSR. Undergoing hemodialysis this date per Dr Yee    Review of Systems   Constitutional:  Positive for activity change, diaphoresis and fatigue.   HENT: Negative.     Eyes: Negative.    Respiratory:          DUBOSE   Cardiovascular: Negative.  Negative for palpitations.   Gastrointestinal:  Positive for abdominal distention. Negative for abdominal pain.   Endocrine: Positive for cold intolerance.   Genitourinary:  Positive for decreased urine volume.   Musculoskeletal:  Positive for arthralgias and myalgias.   Skin: Negative.    Allergic/Immunologic: Positive for immunocompromised state.   Neurological:  Positive for weakness.   Hematological:  Bruises/bleeds easily.   Psychiatric/Behavioral:  The patient is nervous/anxious.      Objective:     Vital Signs (Most Recent):  Temp: 97.8 °F (36.6 °C) (01/05/24 1520)  Pulse: 70 (01/05/24 1800)  Resp: 19 (01/05/24 1520)  BP: (!) 118/55 (01/05/24 1800)  SpO2: 98 % (01/05/24 1520) Vital Signs (24h Range):  Temp:  [97.8 °F (36.6 °C)] 97.8 °F (36.6 °C)  Pulse:  [54-75] 70  Resp:  [15-29] 19  SpO2:  [97 %-100 %] 98 %  BP: ()/(48-98) 118/55     Weight: 49.9 kg (110 lb)  Body mass index is 17.75 kg/m².  No intake or output data in the 24 hours ending 01/05/24 1809      Physical Exam  Vitals and nursing note reviewed.   Constitutional:       General: She is not in acute distress.     Appearance: She is ill-appearing. She is not diaphoretic.   HENT:      Head: Atraumatic.      Nose: Nose normal.      Mouth/Throat:      Mouth: Mucous membranes are moist.   Eyes:      Extraocular Movements: Extraocular movements intact.      Pupils: Pupils are equal, round, and reactive to light.   Cardiovascular:      Rate and Rhythm: Normal rate and regular rhythm.   Pulmonary:      Effort: Pulmonary effort is normal.      Breath sounds: Rales present.   Abdominal:      General: Bowel sounds are normal.   Musculoskeletal:         General:  [facility]    Progress Note    Terrence Pool Patient Status:  Inpatient    1939 MRN [id number]   Location [facility] 4W/SW/SE Attending Kevan Norman MD   Hosp Day # 3 PCP Sj Krishnan MD     Date of Admission:   Normal range of motion.      Cervical back: Normal range of motion and neck supple.   Skin:     General: Skin is warm.   Neurological:      Mental Status: She is alert and oriented to person, place, and time.   Psychiatric:      Comments: Moldly dulled affect             Significant Labs: All pertinent labs within the past 24 hours have been reviewed.  Recent Lab Results         01/05/24  1353   01/05/24  0431        Albumin   3.4       ALP   45       ALT   9       Anion Gap   10       Ao root annulus 2.80         Ao peak paige 1.56         Ao VTI 36.20         AST   21       AV valve area 1.41         JERSEY by Velocity Ratio 1.34         AORTIC VALVE CUSP SEPERATION 0.70         AV mean gradient 5         AV index (prosthetic) 0.62         AV peak gradient 10         AV Velocity Ratio 0.59         Baso #   0.03       Basophil %   0.5       BILIRUBIN TOTAL   0.6  Comment: For infants and newborns, interpretation of results should be based  on gestational age, weight and in agreement with clinical  observations.    Premature Infant recommended reference ranges:  Up to 24 hours.............<8.0 mg/dL  Up to 48 hours............<12.0 mg/dL  3-5 days..................<15.0 mg/dL  6-29 days.................<15.0 mg/dL         BSA 1.52         BUN   31       Calcium   8.1       Chloride   102       CO2   30       Creatinine   7.4       Left Ventricle Relative Wall Thickness 0.54         Differential Method   Automated       E/A ratio 2.30         E/E' ratio 29.38         eGFR   5.1       Eos #   0.6       Eosinophil %   10.9       E wave deceleration time 254.00         FS 34         Glucose   70       Gran # (ANC)   2.3       Gran %   40.6       Hematocrit   29.2       Hemoglobin   9.0       Immature Grans (Abs)   0.01  Comment: Mild elevation in immature granulocytes is non specific and   can be seen in a variety of conditions including stress response,   acute inflammation, trauma and pregnancy. Correlation with other  mg, 4 mg, Intravenous, Once PRN  [] Prochlorperazine Edisylate (COMPAZINE) injection 5 mg, 5 mg, Intravenous, Once PRN  [] haloperidol lactate (HALDOL) 5 MG/ML injection 0.5 mg, 0.5 mg, Intravenous, Once PRN  [COMPLETED] lactated ringers IV b   laboratory and clinical findings is essential.         Immature Granulocytes   0.2       IVC diameter 2.19         IVSd 0.91         LVOT area 2.3         LV LATERAL E/E' RATIO 26.11         LV SEPTAL E/E' RATIO 33.57         LV EDV BP 68.80         LV Diastolic Volume Index 44.39         LVIDd 3.97         LVIDs 2.61         LV mass 124.68         LV Mass Index 80         Left Ventricular Outflow Tract Mean Gradient 2.00         Left Ventricular Outflow Tract Mean Velocity 0.62         LVOT diameter 1.70         LVOT peak romeo 0.92         LVOT stroke volume 51.04         LVOT peak VTI 22.50         LV ESV BP 24.80         LV Systolic Volume Index 16.0         Lymph #   1.5       Lymph %   26.2       Magnesium    2.1       MCH   31.6       MCHC   30.8       MCV   103       Mean e' 0.08         Mono #   1.2       Mono %   21.6       MPV   9.4       Mr max romeo 4.52         MV valve area by continuity eq 0.72         MV mean gradient 7         MV peak gradient 20         MV Peak A Romeo 1.02         MV Peak E Romeo 2.35         MV VTI 71.1         nRBC   0       Phosphorus Level   4.8       Platelet Count   388       Potassium   4.5       PROTEIN TOTAL   6.3       Pulmonary Valve Mean Velocity 0.59         PV peak gradient 3         PV PEAK VELOCITY 0.85         Posterior Wall 1.08         Est. RA pres 3         RBC   2.85       RDW   15.9       RV S' 11.50         RV TB RVSP 6         Sodium   142       TAPSE 2.38         TDI SEPTAL 0.07         TDI LATERAL 0.09         Triscuspid Valve Regurgitation Peak Gradient 38         TR Max Romeo 3.10         Troponin I High Sensitivity   62.7  Comment: Troponin results differ between methods. Do not use   results between Troponin methods interchangeably.    Alkaline Phospatase levels above 400 U/L may   cause false positive results.    Access hsTnI should not be used for patients taking   Asfotase rosy (Strensiq).  Critical result TNIHS 62.7 pg/mL called to and read back by [facility]  (SINGULAIR) tab 10 mg, 10 mg, Oral, QPM  amLODIPine Besylate (NORVASC) tab 7.5 mg, 7.5 mg, Oral, QAM        HISTORY:  Past Medical History:  Past Medical History:   Diagnosis Date   • Adenocarcinoma of pancreas ([facility] [address] 6/23/2021    Pathology-invasive moderat   Gregoria Canseco  at 05-Jan-2024 05:17 by Iron.         TV resting pulmonary artery pressure 41         WBC   5.69       ZLVIDD -1.22         ZLVIDS -0.51                 Significant Imaging: I have reviewed all pertinent imaging results/findings within the past 24 hours.   induration, fluctuance, nor cellulitis observed. Juleen Flick' sign is negative. His distal neurovascular status is unchanged and intact. There is good color and capillary refill noted.     Laboratory Data:  Lab Results   Component Value Date    WBC 18.3 (H) 0 on 7/03/2021 at 8:31 AM     Finalized by (CST): Patt Olszewski, MD on 7/03/2021 at 8:52 AM           EKG 12 Lead    Result Date: 7/3/2021  ECG Report  Interpretation  -------------------------- Sinus Tachycardia WITHIN NORMAL LIMITS When compared with ECG o

## 2024-01-06 NOTE — PROGRESS NOTES
01/05/24 1840   Vital Signs   Temp 97.9 °F (36.6 °C)   Pulse 72   Resp 18   SpO2 99 %   BP (!) 119/56   Post-Hemodialysis Assessment   Rinseback Volume (mL) 250 mL   Blood Volume Processed (Liters) 68.9 L   Dialyzer Clearance Lightly streaked   Duration of Treatment 180 minutes   Total UF (mL) 1500 mL   Net Fluid Removal 1000   Patient Response to Treatment tolerated well   Post-Treatment Weight 48.8 kg (107 lb 9.4 oz)   Treatment Weight Change -1.2   Arterial bleeding stop time (min) 10 min   Venous bleeding stop time (min) 10 min   Post-Hemodialysis Comments no problems

## 2024-01-06 NOTE — HOSPITAL COURSE
83F with PMH ESRD, Afib, HTN, and CAD is admitted due to hypotension and Afib with RVR requiring diltiazem infusion. Home midodrine given. She converted to normal sinus rhythm and her BP stabilized. Underwent dialysis with nephrology while inpatient. Remained fatigued. Noted to have recently left SNF due to having to pay for additional days. PT/OT and CM consulted.    1/7/2024  Ms Isaac is feeling generally with no other complaints    1/8/2024  Ms Isaac is anxious to go home. Pt may be discharged from Nephrology's standpoint per Dr Drummond.   ROS: generalized weakness otherwise negative X 9  PE: in no distress HEENT MURRAY EOM intact moist mucus membranes Neck supple no use of accessory muscles Lungs no crackles wheezes or rhonchi Heart S 1 S 2 RRR no murmur Abdomen BS+ nontender Ext without CC or E pulses 1-2+ Skin no acute finding Neuro no acute sensory or motor deficit

## 2024-01-06 NOTE — PLAN OF CARE
Pt was explained WARD. Pt verbalized understanding of WARD and signed. WARD scanned to .   01/06/24 1126   WARD Message   Medicare Outpatient and Observation Notification regarding financial responsibility Given to patient/caregiver;Explained to patient/caregiver;Signed/date by patient/caregiver   Date WARD was signed 01/06/24   Time WARD was signed [id number]

## 2024-01-06 NOTE — PLAN OF CARE
[facility]  Initial Discharge Assessment       Primary Care Provider: Kalpesh Goel MD    Admission Diagnosis: A-fib [I48.91]    Admission Date: 1/4/2024  Expected Discharge Date:      completed discharge assessment with Pt at bedside. Pt AAOx4s. Pt lives at home with son and daughter in law. Demographics, PCP, and insurance verified. [facility]. Pt recently discharged from [facility] and is now in copay days. No dialysis. Pt completes ADLs with the assistance of a rollator, wheelchair, shower chair bedside commode, and grab bars. Pt to discharge home via family transport. Pt has no other needs to be addressed at this time.    Transition of Care Barriers: None    Payor: [employer] MEDICARE / Plan: [employer] MEDICARE HMO / Product Type: Capitation /     Extended Emergency Contact Information  Primary Emergency Contact: Raffi Isaac  Address: [address] [place] [address] [id number] [place] [address]  Home Phone: [phone]  Mobile Phone: [phone]  Relation: Son   needed? No  Secondary Emergency Contact: Marcelo Isaac  Mobile Phone: [phone]  Relation: Son    Discharge Plan A: Home Health  Discharge Plan B: Home Health      [facility] [address] [place], [place] - [address]  [address] [address]  Phone: [phone] Fax: [phone]    [facility] - [place], [place] - [address]  [address]  [place] [address]  Phone: [phone] Fax: [phone]      Initial Assessment (most recent)       Adult Discharge Assessment - 01/06/24 0928          Discharge Assessment    Assessment Type Discharge Planning Assessment     Confirmed/corrected address, phone number and insurance Yes     Confirmed Demographics Correct on Facesheet     Source of Information patient     Does patient/caregiver understand observation status Yes     Communicated ILAN with patient/caregiver Date not available/Unable to  determine     Reason For Admission Atrial fibrillation with RVR with history of known paroxysmal atrial fibrillation     People in Home child(aron), adult   Son and daughter in law    Facility Arrived From: Home (Recently discharged from [facility])     Do you expect to return to your current living situation? Yes     Do you have help at home or someone to help you manage your care at home? Yes     Who are your caregiver(s) and their phone number(s)? Son and daughter in law     Prior to hospitilization cognitive status: Alert/Oriented     Current cognitive status: Alert/Oriented     Walking or Climbing Stairs Difficulty yes     Walking or Climbing Stairs ambulation difficulty, requires equipment     Mobility Management Rollator     Dressing/Bathing Difficulty yes     Dressing/Bathing Management showr chair and grab bars     Home Accessibility wheelchair accessible     Home Layout Able to live on 1st floor     Equipment Currently Used at Home shower chair;rollator;bedside commode;grab bar     Readmission within 30 days? Yes     Patient currently being followed by outpatient case management? No     Do you currently have service(s) that help you manage your care at home? Yes     Name and Contact number of agency [facility]     Is the pt/caregiver preference to resume services with current agency Yes     Do you take prescription medications? Yes     Do you have prescription coverage? Yes     Coverage Payor: HU[employer] MANAGED MEDICARE - [employer] MEDICARE HMO -     Do you have any problems affording any of your prescribed medications? No     Is the patient taking medications as prescribed? yes     Who is going to help you get home at discharge? Raffi Isaac (Son) [phone] (     How do you get to doctors appointments? family or friend will provide     Are you on dialysis? Yes     Dialysis Name and Scheduled days [facility]      Do you take coumadin? No     Discharge Plan A Home Health     Discharge Plan  B Home Health     DME Needed Upon Discharge  none     Discharge Plan discussed with: Patient     Transition of Care Barriers None

## 2024-01-06 NOTE — SUBJECTIVE & OBJECTIVE
Interval History: Patient seen and examined. ALLAN. Feels fatigued.      Objective:     Vital Signs (Most Recent):  Temp: 97.7 °F (36.5 °C) (01/06/24 1100)  Pulse: 69 (01/06/24 1200)  Resp: 18 (01/06/24 1200)  BP: 97/69 (01/06/24 1200)  SpO2: 100 % (01/06/24 1200) Vital Signs (24h Range):  Temp:  [97.7 °F (36.5 °C)-98.6 °F (37 °C)] 97.7 °F (36.5 °C)  Pulse:  [60-76] 69  Resp:  [16-20] 18  SpO2:  [97 %-100 %] 100 %  BP: ()/(44-69) 97/69     Weight: 49.9 kg (110 lb)  Body mass index is 17.75 kg/m².    Intake/Output Summary (Last 24 hours) at 1/6/2024 1232  Last data filed at 1/6/2024 0544  Gross per 24 hour   Intake 360 ml   Output 1500 ml   Net -1140 ml         Physical Exam  Vitals reviewed.   Constitutional:       General: She is not in acute distress.     Appearance: She is underweight. She is ill-appearing (chronic).   HENT:      Head: Normocephalic and atraumatic.   Cardiovascular:      Rate and Rhythm: Normal rate and regular rhythm.   Pulmonary:      Effort: Pulmonary effort is normal. No respiratory distress.   Neurological:      General: No focal deficit present.      Mental Status: She is alert and oriented to person, place, and time. Mental status is at baseline.   Psychiatric:         Mood and Affect: Affect normal.         Behavior: Behavior normal.             Significant Labs: All pertinent labs within the past 24 hours have been reviewed.    Significant Imaging: I have reviewed all pertinent imaging results/findings within the past 24 hours.

## 2024-01-06 NOTE — ASSESSMENT & PLAN NOTE
Stable. Current CBC reviewed-   Lab Results   Component Value Date    HGB 8.3 (L) 01/06/2024    HCT 27.6 (L) 01/06/2024     Monitor daily CBC and transfuse if patient becomes hemodynamically unstable, symptomatic or H/H drops below 7/21.

## 2024-01-06 NOTE — PROGRESS NOTES
INPATIENT NEPHROLOGY CONSULT   [facility]    Patient Name: Tyra Isaac  Date: 01/06/2024    Reason for consultation: ESRD    Chief Complaint:   Chief Complaint   Patient presents with    Vomiting    Nausea    Shortness of Breath    Chest Pain     History of Present Illness:  82 y/o F with ESRD on HD MWF who p/w PMD office with n/v, found to be hypotensive with AF with RVR, currently on cardizem gtt in ER, awaiting admission, looks frail/tired, had HD yest with some breathing difficulty she reports, but completed treatment, no exac/reliev factors, consulted for dialysis.    Interval History:  1/4- on cardizem- -140, CXR clear  1/5 VSS. Seen on HD today.  1/6 VSS, no new complains. Feels tired and weak.    Plan of Care:    ESRD on HD MWF  Chronic hypotension- 7/2023 echo with pEF, no DD, MR, pulm HTN  AF with RVR- trop leak, BNP > 2000 (worse compared to prior)  Hypokalemia  Lactic acidosis   Secondary HPT  Anemia of CKD    Plan:    - ordered HD MWF  - resumed midodrine  - attempt UF as tolerated- CXR clear but BNP up- hypervolemia can exacerbate AF  - hypokalemia can also exacerbate AF- adjust dialysate as needed- Mg is replete  - ordered BRAYAN with HD    Thank you for allowing us to participate in this patient's care. We will continue to follow.    Vital Signs:  Temp Readings from Last 3 Encounters:   01/06/24 98.3 °F (36.8 °C)   12/21/23 98.6 °F (37 °C) (Oral)   12/15/23 98.1 °F (36.7 °C)       Pulse Readings from Last 3 Encounters:   01/06/24 72   01/04/24 109   12/21/23 60       BP Readings from Last 3 Encounters:   01/06/24 (!) 121/58   01/04/24 128/72   12/21/23 (!) 112/41       Weight:  Wt Readings from Last 3 Encounters:   01/04/24 49.9 kg (110 lb)   01/05/24 49.9 kg (110 lb 0.2 oz)   01/04/24 45.4 kg (100 lb)       Past Medical & Surgical History:  Past Medical History:   Diagnosis Date    A-fib     Anxiety     Depression     Disorder of kidney and ureter     Encephalopathy  acute 1/1/2018    End stage kidney disease 6/17/2017    Gout     Hyperlipidemia     Hypertension     Moderate episode of recurrent major depressive disorder 1/17/2018    Nephropathy hypertensive, 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    Osteopenia of multiple sites 3/9/2018    Based upon bone density measurements. Patient also has chronic kidney disease.    Stroke 11/2016       Past Surgical History:   Procedure Laterality Date    ANGIOGRAM, CORONARY, WITH LEFT HEART CATHETERIZATION N/A 2/7/2022    Procedure: Angiogram, Coronary, with Left Heart Cath;  Surgeon: Gino Leal MD;  Location: [facility] CATH/EP LAB;  Service: Cardiology;  Laterality: N/A;    CARDIAC SURGERY      stents    EYE SURGERY      WRIST SURGERY         Past Social History:  Social History     Socioeconomic History    Marital status:      Spouse name: Aria Isaac    Number of children: 3   Occupational History    Occupation: Not working   Tobacco Use    Smoking status: Never    Smokeless tobacco: Never   Substance and Sexual Activity    Alcohol use: No    Drug use: No    Sexual activity: Not Currently     Social Determinants of Health     Financial Resource Strain: Medium Risk (6/15/2022)    Overall Financial Resource Strain (CARDIA)     Difficulty of Paying Living Expenses: Somewhat hard   Food Insecurity: No Food Insecurity (6/15/2022)    Hunger Vital Sign     Worried About Running Out of Food in the Last Year: Never true     Ran Out of Food in the Last Year: Never true   Transportation Needs: Unmet Transportation Needs (3/29/2023)    PRAPARE - Transportation     Lack of Transportation (Medical): Yes     Lack of Transportation (Non-Medical): No   Physical Activity: Inactive (6/15/2022)    Exercise Vital Sign     Days of Exercise per Week: 0 days     Minutes of Exercise per Session: 0 min   Stress:  Stress Concern Present (6/15/2022)    [employer] - Occupational Stress Questionnaire     Feeling of Stress : To some extent   Social Connections: Moderately Isolated (6/15/2022)    Social Connection and Isolation Panel [NHANES]     Frequency of Communication with Friends and Family: Three times a week     Frequency of Social Gatherings with Friends and Family: Three times a week     Attends [employer] Services: 1 to 4 times per year     Active Member of Clubs or Organizations: No     Attends Club or Organization Meetings: Never     Marital Status:    Housing Stability: Low Risk  (6/15/2022)    Housing Stability Vital Sign     Unable to Pay for Housing in the Last Year: No     Number of Places Lived in the Last Year: 1     Unstable Housing in the Last Year: No       Medications:  Scheduled Meds:   albuterol-ipratropium  3 mL Nebulization Q6H WAKE    apixaban  2.5 mg Oral BID    atorvastatin  40 mg Oral Daily    dorzolamide-timolol 2-0.5%  1 drop Both Eyes BID    dronedarone  400 mg Oral BID WM    epoetin rosy-epbx  100 Units/kg Subcutaneous Every Mon, Wed, Fri    latanoprost  1 drop Both Eyes QHS    midodrine  10 mg Oral TID    polyethylene glycol  17 g Oral Daily     Continuous Infusions:      PRN Meds:.  No current facility-administered medications on file prior to encounter.     Current Outpatient Medications on File Prior to Encounter   Medication Sig Dispense Refill    atorvastatin (LIPITOR) 40 MG tablet Take 40 mg by mouth once daily.      b complex vitamins tablet Take 1 tablet by mouth once daily.      dorzolamide-timolol 2-0.5% (COSOPT) 22.3-6.8 mg/mL ophthalmic solution Place 1 drop into both eyes 2 (two) times daily.      dronedarone (MULTAQ) 400 mg Tab Take 1 tablet (400 mg total) by mouth 2 (two) times daily with meals. 180 tablet 0    ELIQUIS 2.5 mg Tab Take 2.5 mg by mouth 2 (two) times daily.      latanoprost 0.005 % ophthalmic solution Place 1 drop into both eyes  every evening.      levalbuterol (XOPENEX) 0.63 mg/3 mL nebulizer solution Take 1 ampule by nebulization every 4 to 6 hours as needed for Wheezing or Shortness of Breath.      loperamide (IMODIUM A-D) 2 mg Tab Take 1 tablet (2 mg total) by mouth 4 (four) times daily as needed (diarrhea). 3 tablet 0    midodrine (PROAMATINE) 10 MG tablet Take 1 tablet (10 mg total) by mouth 3 (three) times daily with meals. 270 tablet 0    ondansetron (ZOFRAN) 4 MG tablet Take 1 tablet (4 mg total) by mouth every 8 (eight) hours as needed for Nausea. 15 tablet 0    predniSONE (DELTASONE) 20 MG tablet Take 20 mg by mouth once daily.      senna-docusate 8.6-50 mg (PERICOLACE) 8.6-50 mg per tablet Take 1 tablet by mouth 2 (two) times daily as needed for Constipation. 30 tablet 0       Allergies:  Cyclobenzaprine, Fish containing products, Peanut, and Tramadol    Past Family History:  Reviewed; refer to Hospitalist Admission Note    Review of Systems:  Review of Systems - All 14 systems reviewed and negative, except as noted in HPI    Physical Exam:  General Appearance:    NAD, AAO x 3, cooperative, appears stated age   Head:    Normocephalic, atraumatic   Eyes:    PER, EOMI, and conjunctiva/sclera clear bilaterally       Mouth:   Moist mucus membranes, no thrush or oral lesions,       normal dentition   Back:     No CVA tenderness   Lungs:     Clear to auscultation bilaterally, no wheezes, crackles,           rales or rhonchi, symmetric air movement, respirations unlabored   Chest wall:    No tenderness or deformity   Heart:    Irreg irreg, S1 and S2 normal, no murmur, rub   or gallop   Abdomen:     Soft, non-tender, non-distended, bowel sounds active all four   quadrants, no RT or guarding, no masses, no organomegaly   Extremities:   Warm and well perfused, distal pulses are intact, no             cyanosis or peripheral edema   MSK:   No joint or muscle swelling, tenderness or deformity   Skin:   Skin color, texture, turgor normal, no  "rashes or lesions   Neurologic/Psychiatric:   CNII-XII intact, normal strength and sensation       throughout, no asterixis; normal affect, memory, judgement     and insight      Results:  Recent Labs   Lab 01/04/24  1109 01/05/24  0431 01/06/24  0303    142 138   K 3.6 4.5 4.6   CL 96 102 108   CO2 29 30* 23   BUN 25* 31* 16   CREATININE 6.0* 7.4* 4.9*   * 70 90         Recent Labs   Lab 01/04/24  1109 01/05/24  0431 01/06/24  0303   CALCIUM 9.7 8.1* 8.0*   ALBUMIN 4.5 3.4* 3.3*   PHOS  --  4.8* 2.9   MG 2.1 2.1 2.0               No results for input(s): "POCTGLUCOSE" in the last 168 hours.    Recent Labs   Lab 01/26/22  1525   Hemoglobin A1C 5.3         Recent Labs   Lab 01/04/24 1109 01/05/24 0431 01/06/24  0304   WBC 6.26 5.69 5.13   HGB 11.7* 9.0* 8.3*   HCT 38.1 29.2* 27.6*   * 388 342   * 103* 105*   MCHC 30.7* 30.8* 30.1*   MONO 13.9  0.9 21.6*  1.2* 20.3*  1.0   EOSINOPHIL 9.3* 10.9* 9.6*         Recent Labs   Lab 01/04/24 1109 01/05/24 0431 01/06/24  0303   BILITOT 0.8 0.6 0.5   PROT 8.6* 6.3 6.3   ALBUMIN 4.5 3.4* 3.3*   ALKPHOS 67 45* 49*   ALT 16 9* 10   AST 27 21 20         Recent Labs   Lab 11/09/23 2028 12/04/23  0959 12/20/23  1247   Color, UA Yellow Yellow Yellow   Appearance, UA Clear Clear Clear   pH, UA >8.0 A 8.0 6.0   Specific Aline, UA 1.015 1.015 1.015   Protein, UA 2+ A 2+ A 2+ A   Glucose, UA Trace A 1+ A Negative   Ketones, UA Negative Trace A Negative   Urobilinogen, UA 2.0-3.0 A Negative Negative   Bilirubin (UA) Negative Negative Negative   Occult Blood UA Trace A 2+ A Negative   Nitrite, UA Negative Negative Negative   RBC, UA 1 41 H 0   WBC, UA 0 2 3   Bacteria Negative Negative Rare   Hyaline Casts, UA 3 A 6 A 8 A         Recent Labs   Lab 11/10/23  1518 11/10/23  1529 12/20/23  1207   POC PH 7.395 7.391 7.390   POC PCO2 36.5 37.2 37.3   POC HCO3 22.4 L 22.6 L 22.6 L   POC PO2 54 LL 53 LL 99   POC SATURATED O2 88 87 98   POC BE -3 L -2 -2 "   Sample ARTERIAL ARTERIAL ARTERIAL         Microbiology Results (last 7 days)       Procedure Component Value Units Date/Time    Blood culture #1 **CANNOT BE ORDERED STAT** [[id number]] Collected: 01/04/24 1138    Order Status: Completed Specimen: Blood from Antecubital, Left Updated: 01/05/24 1432     Blood Culture, Routine No Growth to date      No Growth to date    Blood culture #2 **CANNOT BE ORDERED STAT** [[id number]] Collected: 01/04/24 1214    Order Status: Completed Specimen: Blood from Antecubital, Left Updated: 01/05/24 1432     Blood Culture, Routine No Growth to date      No Growth to date           have spent > minutes providing care for this patient for the above diagnoses. These services have included chart/data/imaging review, evaluation, exam, formulation of plan, , note preparation, and discussions with staff involved in this patient's care.    Raleigh Yee., MD  [facility] [address] [address]  [phone] (p)  [phone] (f)

## 2024-01-07 LAB
ALBUMIN SERPL BCP-MCNC: 3.3 G/DL (ref 3.5–5.2)
ALP SERPL-CCNC: 44 U/L (ref 55–135)
ALT SERPL W/O P-5'-P-CCNC: 12 U/L (ref 10–44)
ANION GAP SERPL CALC-SCNC: 9 MMOL/L (ref 8–16)
AST SERPL-CCNC: 21 U/L (ref 10–40)
BASOPHILS # BLD AUTO: 0.04 K/UL (ref 0–0.2)
BASOPHILS NFR BLD: 0.6 % (ref 0–1.9)
BILIRUB SERPL-MCNC: 0.6 MG/DL (ref 0.1–1)
BUN SERPL-MCNC: 28 MG/DL (ref 8–23)
CALCIUM SERPL-MCNC: 8.2 MG/DL (ref 8.7–10.5)
CHLORIDE SERPL-SCNC: 109 MMOL/L (ref 95–110)
CO2 SERPL-SCNC: 22 MMOL/L (ref 23–29)
CREAT SERPL-MCNC: 7.2 MG/DL (ref 0.5–1.4)
DIFFERENTIAL METHOD BLD: ABNORMAL
EOSINOPHIL # BLD AUTO: 0.5 K/UL (ref 0–0.5)
EOSINOPHIL NFR BLD: 7.9 % (ref 0–8)
ERYTHROCYTE [DISTWIDTH] IN BLOOD BY AUTOMATED COUNT: 16 % (ref 11.5–14.5)
EST. GFR  (NO RACE VARIABLE): 5.2 ML/MIN/1.73 M^2
GLUCOSE SERPL-MCNC: 63 MG/DL (ref 70–110)
HCT VFR BLD AUTO: 26.7 % (ref 37–48.5)
HGB BLD-MCNC: 8 G/DL (ref 12–16)
IMM GRANULOCYTES # BLD AUTO: 0.03 K/UL (ref 0–0.04)
IMM GRANULOCYTES NFR BLD AUTO: 0.5 % (ref 0–0.5)
LYMPHOCYTES # BLD AUTO: 1.3 K/UL (ref 1–4.8)
LYMPHOCYTES NFR BLD: 20.2 % (ref 18–48)
MAGNESIUM SERPL-MCNC: 2 MG/DL (ref 1.6–2.6)
MCH RBC QN AUTO: 31.3 PG (ref 27–31)
MCHC RBC AUTO-ENTMCNC: 30 G/DL (ref 32–36)
MCV RBC AUTO: 104 FL (ref 82–98)
MONOCYTES # BLD AUTO: 1.2 K/UL (ref 0.3–1)
MONOCYTES NFR BLD: 18.7 % (ref 4–15)
NEUTROPHILS # BLD AUTO: 3.4 K/UL (ref 1.8–7.7)
NEUTROPHILS NFR BLD: 52.1 % (ref 38–73)
NRBC BLD-RTO: 0 /100 WBC
PHOSPHATE SERPL-MCNC: 3.8 MG/DL (ref 2.7–4.5)
PLATELET # BLD AUTO: 351 K/UL (ref 150–450)
PMV BLD AUTO: 9.6 FL (ref 9.2–12.9)
POTASSIUM SERPL-SCNC: 5.5 MMOL/L (ref 3.5–5.1)
PROT SERPL-MCNC: 5.9 G/DL (ref 6–8.4)
RBC # BLD AUTO: 2.56 M/UL (ref 4–5.4)
SODIUM SERPL-SCNC: 140 MMOL/L (ref 136–145)
WBC # BLD AUTO: 6.58 K/UL (ref 3.9–12.7)

## 2024-01-07 PROCEDURE — 97161 PT EVAL LOW COMPLEX 20 MIN: CPT

## 2024-01-07 PROCEDURE — 83735 ASSAY OF MAGNESIUM: CPT | Performed by: PHYSICAL THERAPY ASSISTANT

## 2024-01-07 PROCEDURE — 85025 COMPLETE CBC W/AUTO DIFF WBC: CPT | Performed by: PHYSICAL THERAPY ASSISTANT

## 2024-01-07 PROCEDURE — 97116 GAIT TRAINING THERAPY: CPT

## 2024-01-07 PROCEDURE — 94640 AIRWAY INHALATION TREATMENT: CPT | Mod: XB

## 2024-01-07 PROCEDURE — 99900031 HC PATIENT EDUCATION (STAT)

## 2024-01-07 PROCEDURE — 84100 ASSAY OF PHOSPHORUS: CPT | Performed by: PHYSICAL THERAPY ASSISTANT

## 2024-01-07 PROCEDURE — 25000242 PHARM REV CODE 250 ALT 637 W/ HCPCS: Performed by: PHYSICAL THERAPY ASSISTANT

## 2024-01-07 PROCEDURE — 36415 COLL VENOUS BLD VENIPUNCTURE: CPT | Performed by: PHYSICAL THERAPY ASSISTANT

## 2024-01-07 PROCEDURE — 90935 HEMODIALYSIS ONE EVALUATION: CPT

## 2024-01-07 PROCEDURE — 94761 N-INVAS EAR/PLS OXIMETRY MLT: CPT

## 2024-01-07 PROCEDURE — 99900035 HC TECH TIME PER 15 MIN (STAT)

## 2024-01-07 PROCEDURE — 12000002 HC ACUTE/MED SURGE SEMI-PRIVATE ROOM

## 2024-01-07 PROCEDURE — 80053 COMPREHEN METABOLIC PANEL: CPT | Performed by: PHYSICAL THERAPY ASSISTANT

## 2024-01-07 PROCEDURE — 25000003 PHARM REV CODE 250: Performed by: INTERNAL MEDICINE

## 2024-01-07 PROCEDURE — 94760 N-INVAS EAR/PLS OXIMETRY 1: CPT

## 2024-01-07 PROCEDURE — 25000003 PHARM REV CODE 250: Performed by: PHYSICAL THERAPY ASSISTANT

## 2024-01-07 RX ADMIN — MIDODRINE HYDROCHLORIDE 10 MG: 2.5 TABLET ORAL at 04:01

## 2024-01-07 RX ADMIN — POLYETHYLENE GLYCOL 3350 17 G: 17 POWDER, FOR SOLUTION ORAL at 08:01

## 2024-01-07 RX ADMIN — DORZOLAMIDE HYDROCHLORIDE AND TIMOLOL MALEATE 1 DROP: 20; 5 SOLUTION/ DROPS OPHTHALMIC at 08:01

## 2024-01-07 RX ADMIN — MIDODRINE HYDROCHLORIDE 10 MG: 2.5 TABLET ORAL at 08:01

## 2024-01-07 RX ADMIN — DRONEDARONE 400 MG: 400 TABLET, FILM COATED ORAL at 04:01

## 2024-01-07 RX ADMIN — SODIUM ZIRCONIUM CYCLOSILICATE 10 G: 10 POWDER, FOR SUSPENSION ORAL at 11:01

## 2024-01-07 RX ADMIN — IPRATROPIUM BROMIDE AND ALBUTEROL SULFATE 3 ML: 2.5; .5 SOLUTION RESPIRATORY (INHALATION) at 07:01

## 2024-01-07 RX ADMIN — APIXABAN 2.5 MG: 2.5 TABLET, FILM COATED ORAL at 08:01

## 2024-01-07 RX ADMIN — LATANOPROST 1 DROP: 50 SOLUTION OPHTHALMIC at 08:01

## 2024-01-07 RX ADMIN — ATORVASTATIN CALCIUM 40 MG: 40 TABLET, FILM COATED ORAL at 08:01

## 2024-01-07 RX ADMIN — DRONEDARONE 400 MG: 400 TABLET, FILM COATED ORAL at 08:01

## 2024-01-07 NOTE — PROGRESS NOTES
[facility]  Progress Note    Patient Name: Tyra Isaac  MRN: [id number]  Patient Class: IP- Inpatient   Admission Date: 1/4/2024  Length of Stay: 0 days  Attending Physician: Humble Yee MD  Primary Care Provider: Kalpesh Goel MD        Subjective:     Principal Problem:Atrial fibrillation with rapid ventricular response        HPI:  Pt is an 83-year-old  female with history of hypertension and ESRD on MWF. Pt presents to the ED today from PCP appointment due to episode of vomiting, rapid heart rate, and hypotension. At time of episode, pt complains of mild dyspnea and chest discomfort. Pt has history of hypotension during dialysis and is on midodrine.  On arrival to ED, pt found to be hypotensive of 70/54, fluid bolus given. Pt found to be in Afib RVR in ED HR of 130 and was started on diltiazem drip. Pt has history of afib and is on chronic eliquis. Pt was recently hospitalized for hypotension and afib and was subsequently discharged to SNF. Pt was discharged from SNF 2 days ago. Pt had last dialysis session yesterday. Hemoglobin 11.7, potassium 3.6, creatinine 6.0, BNP 2284, magnesium 2.1, lactic 2.2, and troponin 61.4.  Pt recently was diagnosed with covid 12/4/23 and continues to have viral cough. Pt does endorse nebulizer treatments have been helpful. Pt denies current chest pain, SOB, nausea, abdominal pain, or heart palpitations. Pt full code.     Overview/Hospital Course:  83F with PMH ESRD, Afib, HTN, and CAD is admitted due to hypotension and Afib with RVR requiring diltiazem infusion. Home midodrine given. She converted to normal sinus rhythm and her BP stabilized. Underwent dialysis with nephrology while inpatient. Remained fatigued. Noted to have recently left SNF due to having to pay for additional days. PT/OT and CM consulted.    1/7/2024  Ms Isaac is feeling generally with no other complaints    Interval History: Ms Isaac feels  very weak and tired.  Hyperkalemia despite lokelma  She will have a short dialysis this AM and in the AM per Dr Yee. At this time she meets inpatient status    Review of Systems   Constitutional:  Positive for activity change, diaphoresis and fatigue.   HENT: Negative.     Eyes: Negative.    Respiratory: Negative.     Cardiovascular: Negative.    Gastrointestinal: Negative.    Endocrine: Positive for cold intolerance.   Genitourinary:  Positive for decreased urine volume.   Musculoskeletal:  Positive for arthralgias, gait problem and myalgias.   Skin: Negative.    Allergic/Immunologic: Positive for immunocompromised state.   Neurological:  Positive for weakness.   Hematological:  Bruises/bleeds easily.   Psychiatric/Behavioral:  Positive for decreased concentration.      Objective:     Vital Signs (Most Recent):  Temp: 98.6 °F (37 °C) (01/07/24 0730)  Pulse: 70 (01/07/24 0730)  Resp: 17 (01/07/24 0730)  BP: 131/62 (01/07/24 0730)  SpO2: 97 % (01/07/24 0730) Vital Signs (24h Range):  Temp:  [98.5 °F (36.9 °C)-99 °F (37.2 °C)] 98.6 °F (37 °C)  Pulse:  [58-78] 70  Resp:  [12-20] 17  SpO2:  [97 %-100 %] 97 %  BP: ()/(51-79) 131/62     Weight: 49.9 kg (110 lb)  Body mass index is 17.75 kg/m².    Intake/Output Summary (Last 24 hours) at 1/7/2024 1127  Last data filed at 1/7/2024 0557  Gross per 24 hour   Intake 200 ml   Output 0 ml   Net 200 ml         Physical Exam  Vitals and nursing note reviewed.   Constitutional:       General: She is not in acute distress.     Appearance: She is ill-appearing. She is not diaphoretic.   HENT:      Head: Normocephalic and atraumatic.      Nose: Nose normal.      Mouth/Throat:      Mouth: Mucous membranes are moist.   Eyes:      Extraocular Movements: Extraocular movements intact.      Pupils: Pupils are equal, round, and reactive to light.   Cardiovascular:      Rate and Rhythm: Normal rate and regular rhythm.   Pulmonary:      Effort: Pulmonary effort is normal.       Breath sounds: Normal breath sounds.   Abdominal:      General: Bowel sounds are normal.   Musculoskeletal:         General: Normal range of motion.      Cervical back: Normal range of motion and neck supple.   Skin:     General: Skin is warm.   Neurological:      Mental Status: She is alert and oriented to person, place, and time.      Motor: Weakness present.   Psychiatric:      Comments: Somewhat dulled affect             Significant Labs: All pertinent labs within the past 24 hours have been reviewed.  Recent Lab Results         01/07/24  0507        Albumin 3.3       ALP 44       ALT 12       Anion Gap 9       AST 21       Baso # 0.04       Basophil % 0.6       BILIRUBIN TOTAL 0.6  Comment: For infants and newborns, interpretation of results should be based  on gestational age, weight and in agreement with clinical  observations.    Premature Infant recommended reference ranges:  Up to 24 hours.............<8.0 mg/dL  Up to 48 hours............<12.0 mg/dL  3-5 days..................<15.0 mg/dL  6-29 days.................<15.0 mg/dL         BUN 28       Calcium 8.2       Chloride 109       CO2 22       Creatinine 7.2       Differential Method Automated       eGFR 5.2       Eos # 0.5       Eosinophil % 7.9       Glucose 63       Gran # (ANC) 3.4       Gran % 52.1       Hematocrit 26.7       Hemoglobin 8.0       Immature Grans (Abs) 0.03  Comment: Mild elevation in immature granulocytes is non specific and   can be seen in a variety of conditions including stress response,   acute inflammation, trauma and pregnancy. Correlation with other   laboratory and clinical findings is essential.         Immature Granulocytes 0.5       Lymph # 1.3       Lymph % 20.2       Magnesium  2.0       MCH 31.3       MCHC 30.0              Mono # 1.2       Mono % 18.7       MPV 9.6       nRBC 0       Phosphorus Level 3.8       Platelet Count 351       Potassium 5.5       PROTEIN TOTAL 5.9       RBC 2.56       RDW 16.0        Sodium 140       WBC 6.58               Significant Imaging: I have reviewed all pertinent imaging results/findings within the past 24 hours.    Assessment/Plan:      * Atrial fibrillation with RVR with history of known paroxysmal atrial fibrillation  Now in NSR  S/p dilt gtt  - continue eliquis and dronedarone    Hypotension  Was acute on chronic now stable  - continue midodrine    Anemia of chronic disease  Stable. Current CBC reviewed-   Lab Results   Component Value Date    HGB 8.3 (L) 01/06/2024    HCT 27.6 (L) 01/06/2024     Monitor daily CBC and transfuse if patient becomes hemodynamically unstable, symptomatic or H/H drops below 7/21.    Elevated troponin  Chronic, no anginal symptoms  - monitor clinically      End stage renal disease on dialysis  - HD per nephro    Debility  Recently left SNF.   - PT/OT and CM consult    Multiple-type hyperlipidemia  - Continue statin       VTE Risk Mitigation (From admission, onward)           Ordered     apixaban tablet 2.5 mg  2 times daily         01/04/24 1625     Reason for No Pharmacological VTE Prophylaxis  Once        Question:  Reasons:  Answer:  Already adequately anticoagulated on oral Anticoagulants    01/04/24 1537     IP VTE HIGH RISK PATIENT  Once         01/04/24 1537     Place sequential compression device  Until discontinued         01/04/24 1537                    Discharge Planning   ILAN:      Code Status: Full Code   Is the patient medically ready for discharge?:     Reason for patient still in hospital (select all that apply): Patient new problem, Treatment, and Consult recommendations  Discharge Plan A: Home Health                  Humble Yee MD  Department of Hospital Medicine   [facility]

## 2024-01-07 NOTE — SUBJECTIVE & OBJECTIVE
Interval History: Ms Isaac feels very weak and tired.  Hyperkalemia despite lokelma  She will have a short dialysis this AM and in the AM per Dr Yee. At this time she meets inpatient status    Review of Systems   Constitutional:  Positive for activity change, diaphoresis and fatigue.   HENT: Negative.     Eyes: Negative.    Respiratory: Negative.     Cardiovascular: Negative.    Gastrointestinal: Negative.    Endocrine: Positive for cold intolerance.   Genitourinary:  Positive for decreased urine volume.   Musculoskeletal:  Positive for arthralgias, gait problem and myalgias.   Skin: Negative.    Allergic/Immunologic: Positive for immunocompromised state.   Neurological:  Positive for weakness.   Hematological:  Bruises/bleeds easily.   Psychiatric/Behavioral:  Positive for decreased concentration.      Objective:     Vital Signs (Most Recent):  Temp: 98.6 °F (37 °C) (01/07/24 0730)  Pulse: 70 (01/07/24 0730)  Resp: 17 (01/07/24 0730)  BP: 131/62 (01/07/24 0730)  SpO2: 97 % (01/07/24 0730) Vital Signs (24h Range):  Temp:  [98.5 °F (36.9 °C)-99 °F (37.2 °C)] 98.6 °F (37 °C)  Pulse:  [58-78] 70  Resp:  [12-20] 17  SpO2:  [97 %-100 %] 97 %  BP: ()/(51-79) 131/62     Weight: 49.9 kg (110 lb)  Body mass index is 17.75 kg/m².    Intake/Output Summary (Last 24 hours) at 1/7/2024 1127  Last data filed at 1/7/2024 0557  Gross per 24 hour   Intake 200 ml   Output 0 ml   Net 200 ml         Physical Exam  Vitals and nursing note reviewed.   Constitutional:       General: She is not in acute distress.     Appearance: She is ill-appearing. She is not diaphoretic.   HENT:      Head: Normocephalic and atraumatic.      Nose: Nose normal.      Mouth/Throat:      Mouth: Mucous membranes are moist.   Eyes:      Extraocular Movements: Extraocular movements intact.      Pupils: Pupils are equal, round, and reactive to light.   Cardiovascular:      Rate and Rhythm: Normal rate and regular rhythm.   Pulmonary:      Effort:  Pulmonary effort is normal.      Breath sounds: Normal breath sounds.   Abdominal:      General: Bowel sounds are normal.   Musculoskeletal:         General: Normal range of motion.      Cervical back: Normal range of motion and neck supple.   Skin:     General: Skin is warm.   Neurological:      Mental Status: She is alert and oriented to person, place, and time.      Motor: Weakness present.   Psychiatric:      Comments: Somewhat dulled affect             Significant Labs: All pertinent labs within the past 24 hours have been reviewed.  Recent Lab Results         01/07/24  0507        Albumin 3.3       ALP 44       ALT 12       Anion Gap 9       AST 21       Baso # 0.04       Basophil % 0.6       BILIRUBIN TOTAL 0.6  Comment: For infants and newborns, interpretation of results should be based  on gestational age, weight and in agreement with clinical  observations.    Premature Infant recommended reference ranges:  Up to 24 hours.............<8.0 mg/dL  Up to 48 hours............<12.0 mg/dL  3-5 days..................<15.0 mg/dL  6-29 days.................<15.0 mg/dL         BUN 28       Calcium 8.2       Chloride 109       CO2 22       Creatinine 7.2       Differential Method Automated       eGFR 5.2       Eos # 0.5       Eosinophil % 7.9       Glucose 63       Gran # (ANC) 3.4       Gran % 52.1       Hematocrit 26.7       Hemoglobin 8.0       Immature Grans (Abs) 0.03  Comment: Mild elevation in immature granulocytes is non specific and   can be seen in a variety of conditions including stress response,   acute inflammation, trauma and pregnancy. Correlation with other   laboratory and clinical findings is essential.         Immature Granulocytes 0.5       Lymph # 1.3       Lymph % 20.2       Magnesium  2.0       MCH 31.3       MCHC 30.0              Mono # 1.2       Mono % 18.7       MPV 9.6       nRBC 0       Phosphorus Level 3.8       Platelet Count 351       Potassium 5.5       PROTEIN TOTAL 5.9        RBC 2.56       RDW 16.0       Sodium 140       WBC 6.58               Significant Imaging: I have reviewed all pertinent imaging results/findings within the past 24 hours.

## 2024-01-07 NOTE — PROGRESS NOTES
INPATIENT NEPHROLOGY CONSULT   [facility]    Patient Name: Tyra Isaac  Date: 01/07/2024    Reason for consultation: ESRD    Chief Complaint:   Chief Complaint   Patient presents with    Vomiting    Nausea    Shortness of Breath    Chest Pain     History of Present Illness:  84 y/o F with ESRD on HD MWF who p/w PMD office with n/v, found to be hypotensive with AF with RVR, currently on cardizem gtt in ER, awaiting admission, looks frail/tired, had HD yest with some breathing difficulty she reports, but completed treatment, no exac/reliev factors, consulted for dialysis.    Interval History:  1/4- on cardizem- -140, CXR clear  1/5 VSS. Seen on HD today.  1/6 VSS, no new complains. Feels tired and weak.  1/7 VSS. Hyperkalemia, despite Lokelma and low K diet. Will do a short HD today and then in AM.    Plan of Care:      ESRD on HD MWF  Chronic hypotension- 7/2023 echo with pEF, no DD, MR, pulm HTN  AF with RVR- trop leak, BNP > 2000 (worse compared to prior)  Hyperkalemia  Lactic acidosis   Secondary HPT  Anemia of CKD    Plan:    - ordered HD MWF, short HD today for hyperkalemia  - resumed midodrine  - attempt UF as tolerated- CXR clear but BNP up- hypervolemia can exacerbate AF  - hypokalemia/hyperkalemia can also exacerbate AF- adjust dialysate as needed- Mg is replete  - ordered BRAYAN with HD    Thank you for allowing us to participate in this patient's care. We will continue to follow.    Vital Signs:  Temp Readings from Last 3 Encounters:   01/07/24 98.6 °F (37 °C) (Oral)   12/21/23 98.6 °F (37 °C) (Oral)   12/15/23 98.1 °F (36.7 °C)       Pulse Readings from Last 3 Encounters:   01/07/24 70   01/04/24 109   12/21/23 60       BP Readings from Last 3 Encounters:   01/07/24 131/62   01/04/24 128/72   12/21/23 (!) 112/41       Weight:  Wt Readings from Last 3 Encounters:   01/04/24 49.9 kg (110 lb)   01/05/24 49.9 kg (110 lb 0.2 oz)   01/04/24 45.4 kg (100 lb)       Past Medical &  Surgical History:  Past Medical History:   Diagnosis Date    A-fib     Anxiety     Depression     Disorder of kidney and ureter     Encephalopathy acute 1/1/2018    End stage kidney disease 6/17/2017    Gout     Hyperlipidemia     Hypertension     Moderate episode of recurrent major depressive disorder 1/17/2018    Nephropathy hypertensive, 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    Osteopenia of multiple sites 3/9/2018    Based upon bone density measurements. Patient also has chronic kidney disease.    Stroke 11/2016       Past Surgical History:   Procedure Laterality Date    ANGIOGRAM, CORONARY, WITH LEFT HEART CATHETERIZATION N/A 2/7/2022    Procedure: Angiogram, Coronary, with Left Heart Cath;  Surgeon: Gino Leal MD;  Location: [facility] CATH/EP LAB;  Service: Cardiology;  Laterality: N/A;    CARDIAC SURGERY      stents    EYE SURGERY      WRIST SURGERY         Past Social History:  Social History     Socioeconomic History    Marital status:      Spouse name: Aria Isaac    Number of children: 3   Occupational History    Occupation: Not working   Tobacco Use    Smoking status: Never    Smokeless tobacco: Never   Substance and Sexual Activity    Alcohol use: No    Drug use: No    Sexual activity: Not Currently     Social Determinants of Health     Financial Resource Strain: Medium Risk (6/15/2022)    Overall Financial Resource Strain (CARDIA)     Difficulty of Paying Living Expenses: Somewhat hard   Food Insecurity: No Food Insecurity (6/15/2022)    Hunger Vital Sign     Worried About Running Out of Food in the Last Year: Never true     Ran Out of Food in the Last Year: Never true   Transportation Needs: Unmet Transportation Needs (3/29/2023)    PRAPARE - Transportation     Lack of Transportation (Medical): Yes     Lack of Transportation (Non-Medical): No   Physical  Activity: Inactive (6/15/2022)    Exercise Vital Sign     Days of Exercise per Week: 0 days     Minutes of Exercise per Session: 0 min   Stress: Stress Concern Present (6/15/2022)    [employer] [place] [employer] - Occupational Stress Questionnaire     Feeling of Stress : To some extent   Social Connections: Moderately Isolated (6/15/2022)    Social Connection and Isolation Panel [NHANES]     Frequency of Communication with Friends and Family: Three times a week     Frequency of Social Gatherings with Friends and Family: Three times a week     Attends Jewish Services: 1 to 4 times per year     Active Member of Clubs or Organizations: No     Attends Club or Organization Meetings: Never     Marital Status:    Housing Stability: Low Risk  (6/15/2022)    Housing Stability Vital Sign     Unable to Pay for Housing in the Last Year: No     Number of Places Lived in the Last Year: 1     Unstable Housing in the Last Year: No       Medications:  Scheduled Meds:   albuterol-ipratropium  3 mL Nebulization Q6H WAKE    apixaban  2.5 mg Oral BID    atorvastatin  40 mg Oral Daily    dorzolamide-timolol 2-0.5%  1 drop Both Eyes BID    dronedarone  400 mg Oral BID WM    [START ON 1/8/2024] epoetin rosy-epbx  10,000 Units Subcutaneous Every Mon, Wed, Fri    latanoprost  1 drop Both Eyes QHS    midodrine  10 mg Oral TID    polyethylene glycol  17 g Oral Daily    sodium zirconium cyclosilicate  10 g Oral Daily     Continuous Infusions:      PRN Meds:.  No current facility-administered medications on file prior to encounter.     Current Outpatient Medications on File Prior to Encounter   Medication Sig Dispense Refill    atorvastatin (LIPITOR) 40 MG tablet Take 40 mg by mouth once daily.      b complex vitamins tablet Take 1 tablet by mouth once daily.      dorzolamide-timolol 2-0.5% (COSOPT) 22.3-6.8 mg/mL ophthalmic solution Place 1 drop into both eyes 2 (two) times daily.      dronedarone (MULTAQ) 400 mg Tab Take  1 tablet (400 mg total) by mouth 2 (two) times daily with meals. 180 tablet 0    ELIQUIS 2.5 mg Tab Take 2.5 mg by mouth 2 (two) times daily.      latanoprost 0.005 % ophthalmic solution Place 1 drop into both eyes every evening.      levalbuterol (XOPENEX) 0.63 mg/3 mL nebulizer solution Take 1 ampule by nebulization every 4 to 6 hours as needed for Wheezing or Shortness of Breath.      loperamide (IMODIUM A-D) 2 mg Tab Take 1 tablet (2 mg total) by mouth 4 (four) times daily as needed (diarrhea). 3 tablet 0    midodrine (PROAMATINE) 10 MG tablet Take 1 tablet (10 mg total) by mouth 3 (three) times daily with meals. 270 tablet 0    ondansetron (ZOFRAN) 4 MG tablet Take 1 tablet (4 mg total) by mouth every 8 (eight) hours as needed for Nausea. 15 tablet 0    predniSONE (DELTASONE) 20 MG tablet Take 20 mg by mouth once daily.      senna-docusate 8.6-50 mg (PERICOLACE) 8.6-50 mg per tablet Take 1 tablet by mouth 2 (two) times daily as needed for Constipation. 30 tablet 0       Allergies:  Cyclobenzaprine, Fish containing products, Peanut, and Tramadol    Past Family History:  Reviewed; refer to Hospitalist Admission Note    Review of Systems:  Review of Systems - All 14 systems reviewed and negative, except as noted in HPI    Physical Exam:  General Appearance:    NAD, AAO x 3, cooperative, appears stated age   Head:    Normocephalic, atraumatic   Eyes:    PER, EOMI, and conjunctiva/sclera clear bilaterally       Mouth:   Moist mucus membranes, no thrush or oral lesions,       normal dentition   Back:     No CVA tenderness   Lungs:     Clear to auscultation bilaterally, no wheezes, crackles,           rales or rhonchi, symmetric air movement, respirations unlabored   Chest wall:    No tenderness or deformity   Heart:    Irreg irreg, S1 and S2 normal, no murmur, rub   or gallop   Abdomen:     Soft, non-tender, non-distended, bowel sounds active all four   quadrants, no RT or guarding, no masses, no organomegaly  "  Extremities:   Warm and well perfused, distal pulses are intact, no             cyanosis or peripheral edema   MSK:   No joint or muscle swelling, tenderness or deformity   Skin:   Skin color, texture, turgor normal, no rashes or lesions   Neurologic/Psychiatric:   CNII-XII intact, normal strength and sensation       throughout, no asterixis; normal affect, memory, judgement     and insight      Results:  Recent Labs   Lab 01/05/24 0431 01/06/24  0303 01/07/24  0507    138 140   K 4.5 4.6 5.5*    108 109   CO2 30* 23 22*   BUN 31* 16 28*   CREATININE 7.4* 4.9* 7.2*   GLU 70 90 63*         Recent Labs   Lab 01/05/24 0431 01/06/24  0303 01/07/24  0507   CALCIUM 8.1* 8.0* 8.2*   ALBUMIN 3.4* 3.3* 3.3*   PHOS 4.8* 2.9 3.8   MG 2.1 2.0 2.0               No results for input(s): "POCTGLUCOSE" in the last 168 hours.    Recent Labs   Lab 01/26/22  1525   Hemoglobin A1C 5.3         Recent Labs   Lab 01/05/24 0431 01/06/24  0304 01/07/24  0507   WBC 5.69 5.13 6.58   HGB 9.0* 8.3* 8.0*   HCT 29.2* 27.6* 26.7*    342 351   * 105* 104*   MCHC 30.8* 30.1* 30.0*   MONO 21.6*  1.2* 20.3*  1.0 18.7*  1.2*   EOSINOPHIL 10.9* 9.6* 7.9         Recent Labs   Lab 01/05/24  0431 01/06/24  0303 01/07/24  0507   BILITOT 0.6 0.5 0.6   PROT 6.3 6.3 5.9*   ALBUMIN 3.4* 3.3* 3.3*   ALKPHOS 45* 49* 44*   ALT 9* 10 12   AST 21 20 21         Recent Labs   Lab 11/09/23 2028 12/04/23  0959 12/20/23  1247   Color, UA Yellow Yellow Yellow   Appearance, UA Clear Clear Clear   pH, UA >8.0 A 8.0 6.0   Specific [place], UA 1.015 1.015 1.015   Protein, UA 2+ A 2+ A 2+ A   Glucose, UA Trace A 1+ A Negative   Ketones, UA Negative Trace A Negative   Urobilinogen, UA 2.0-3.0 A Negative Negative   Bilirubin (UA) Negative Negative Negative   Occult Blood UA Trace A 2+ A Negative   Nitrite, UA Negative Negative Negative   RBC, UA 1 41 H 0   WBC, UA 0 2 3   Bacteria Negative Negative Rare   Hyaline Casts, UA 3 A 6 A 8 A "         Recent Labs   Lab 11/10/23  1518 11/10/23  1529 12/20/23  1207   POC PH 7.395 7.391 7.390   POC PCO2 36.5 37.2 37.3   POC HCO3 22.4 L 22.6 L 22.6 L   POC PO2 54 LL 53 LL 99   POC SATURATED O2 88 87 98   POC BE -3 L -2 -2   Sample ARTERIAL ARTERIAL ARTERIAL         Microbiology Results (last 7 days)       Procedure Component Value Units Date/Time    Blood culture #1 **CANNOT BE ORDERED STAT** [[id number]] Collected: 01/04/24 1138    Order Status: Completed Specimen: Blood from Antecubital, Left Updated: 01/06/24 1432     Blood Culture, Routine No Growth to date      No Growth to date      No Growth to date    Blood culture #2 **CANNOT BE ORDERED STAT** [[id number]] Collected: 01/04/24 1214    Order Status: Completed Specimen: Blood from Antecubital, Left Updated: 01/06/24 1432     Blood Culture, Routine No Growth to date      No Growth to date      No Growth to date           have spent > minutes providing care for this patient for the above diagnoses. These services have included chart/data/imaging review, evaluation, exam, formulation of plan, , note preparation, and discussions with staff involved in this patient's care.    Raleigh Yee., MD  [facility] [address] [address]  [phone] (p)  [phone] (f)

## 2024-01-07 NOTE — NURSING
Nurses Note -- 4 Eyes      1/6/2024   10:30 PM      Skin assessed during: Transfer      [x] No Altered Skin Integrity Present    []Prevention Measures Documented      [] Yes- Altered Skin Integrity Present or Discovered   [] LDA Added if Not in Epic (Describe Wound)   [] New Altered Skin Integrity was Present on Admit and Documented in LDA   [] Wound Image Taken    Wound Care Consulted? No    Attending Nurse:  Padmini Henao RN/Staff Member:   hCe

## 2024-01-07 NOTE — CARE UPDATE
01/06/24 1945   Patient Assessment/Suction   Level of Consciousness (AVPU) alert   Respiratory Effort Normal;Unlabored   Expansion/Accessory Muscles/Retractions no use of accessory muscles   All Lung Fields Breath Sounds clear   Rhythm/Pattern, Respiratory pattern regular;unlabored   Cough Frequency no cough   PRE-TX-O2   Device (Oxygen Therapy) room air   SpO2 99 %   Pulse Oximetry Type Intermittent   $ Pulse Oximetry - Multiple Charge Pulse Oximetry - Multiple   Pulse 68   Resp 17   Aerosol Therapy   $ Aerosol Therapy Charges Aerosol Treatment   Daily Review of Necessity (SVN) completed   Respiratory Treatment Status (SVN) given   Treatment Route (SVN) mask;oxygen   Patient Position (SVN) HOB elevated   Post Treatment Assessment (SVN) breath sounds unchanged   Breath Sounds Post-Respiratory Treatment   Throughout All Fields Post-Treatment All Fields   Throughout All Fields Post-Treatment no change   Post-treatment Heart Rate (beats/min) 74   Post-treatment Resp Rate (breaths/min) 18   Education   $ Education Bronchodilator;15 min   Respiratory Evaluation   $ Care Plan Tech Time 15 min   $ Eval/Re-eval Charges Re-evaluation

## 2024-01-07 NOTE — PT/OT/SLP EVAL
Physical Therapy Evaluation    Patient Name:  Tyra Isaac   MRN:  [id number]    Recommendations:     Discharge Recommendations: Moderate Intensity Therapy versus low intensity therapy with 24/7 supervision from family   Discharge Equipment Recommendations: none   Barriers to discharge: None    Assessment:     Tyra Isaac is a 83 y.o. female admitted with a medical diagnosis of Atrial fibrillation with rapid ventricular response.  She presents with the following impairments/functional limitations: weakness, impaired endurance, impaired self care skills, impaired functional mobility, gait instability, impaired balance, decreased lower extremity function, decreased safety awareness, impaired cardiopulmonary response to activity. Patient is agreeable to participation with PT evaluation. BP at rest of 109/44. She was recently discharged from [facility] and states her son and daughter in law have moved in with her. She uses a rollator for household ambulation and states when she goes out she just holds onto someone. She requires SBA for supine to sit and Karen for sit to stand with RW. She ambulated 20' with RW and CGA-Karen. She declines further ambulation, but is agreeable to sit up in chair with chair alarm on and RN notified.     Rehab Prognosis: Good; patient would benefit from acute skilled PT services to address these deficits and reach maximum level of function.    Recent Surgery: * No surgery found *      Plan:     During this hospitalization, patient to be seen 5 x/week to address the identified rehab impairments via gait training, therapeutic activities, therapeutic exercises and progress toward the following goals:    Plan of Care Expires:  02/07/24    Subjective     Chief Complaint: none verbalized   Patient/Family Comments/goals: agrees to get up   Pain/Comfort:  Pain Rating 1: 0/10    Patients cultural, spiritual, Yarsanism conflicts given the current situation:      Living Environment:  She  was living alone, but was recently discharged from [facility] and states her son and daughter in law have moved in with her. [facility] with 2 KEY and no rails.  Prior to admission, patients level of function was Kady with rollator for household ambulation and states when she goes out she just holds onto someone. She reports 1 fall in the past year. She was recently at SNF and how has HHPT. She does not drive. Equipment used at home: rollator, wheelchair, bedside commode.  DME owned (not currently used): none.  Upon discharge, patient will have assistance from family.    Objective:     Communicated with AMY Choe prior to session.  Patient found HOB elevated with bed alarm, PureWick, telemetry  upon PT entry to room.    General Precautions: Standard, fall  Orthopedic Precautions:N/A   Braces: N/A  Respiratory Status: Room air    Exams:  RLE Strength: WFL  LLE Strength: WFL    Functional Mobility:  Bed Mobility:     Supine to Sit: stand by assistance  Transfers:     Sit to Stand:  minimum assistance with rolling walker  Gait: 20' with RW and CGA-Karen      AM-PAC 6 CLICK MOBILITY  Total Score:19       Treatment & Education:  Patient was educated on the importance of OOB activity and functional mobility to negate negative effects of prolonged bed rest during hospitalization, safe transfers and ambulation, and D/C planning     Patient left up in chair with all lines intact, call button in reach, chair alarm on, and RN notified.    GOALS:   Multidisciplinary Problems       Physical Therapy Goals          Problem: Physical Therapy    Goal Priority Disciplines Outcome Goal Variances Interventions   Physical Therapy Goal     PT, PT/OT      Description: Goals to be met by: 24    Patient will increase functional independence with mobility by performin. Supine to sit with Supervision  2. Sit to stand transfer with Supervision  3. Bed to chair transfer with Supervision using Rolling Walker  4. Gait  x 250 feet with  Supervision using Rolling Walker.   5. Ascend/descend 2 stairs with no hand rails with Supervision using no AD  6. Lower extremity exercise program x20 reps per handout, with supervision                       History:     Past Medical History:   Diagnosis Date    A-fib     Anxiety     Depression     Disorder of kidney and ureter     Encephalopathy acute 1/1/2018    End stage kidney disease 6/17/2017    Gout     Hyperlipidemia     Hypertension     Moderate episode of recurrent major depressive disorder 1/17/2018    Nephropathy hypertensive, 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    Osteopenia of multiple sites 3/9/2018    Based upon bone density measurements. Patient also has chronic kidney disease.    Stroke 11/2016       Past Surgical History:   Procedure Laterality Date    ANGIOGRAM, CORONARY, WITH LEFT HEART CATHETERIZATION N/A 2/7/2022    Procedure: Angiogram, Coronary, with Left Heart Cath;  Surgeon: Gino Leal MD;  Location: [facility] CATH/EP LAB;  Service: Cardiology;  Laterality: N/A;    CARDIAC SURGERY      stents    EYE SURGERY      WRIST SURGERY         Time Tracking:     PT Received On: 01/07/24  PT Start Time: 1140     PT Stop Time: 1157  PT Total Time (min): 17 min     Billable Minutes: Evaluation 7 and Gait Training 10      01/07/2024

## 2024-01-07 NOTE — PROGRESS NOTES
01/07/24 1525   Vital Signs   Temp 98.4 °F (36.9 °C)   Pulse 65   Resp 18   SpO2 99 %   /65   Post-Hemodialysis Assessment   Rinseback Volume (mL) 250 mL   Blood Volume Processed (Liters) 47.3 L   Dialyzer Clearance Lightly streaked   Duration of Treatment 120 minutes   Total UF (mL) 1505 mL   Net Fluid Removal 1005   Patient Response to Treatment tolerated fair   Post-Treatment Weight 48.7 kg (107 lb 5.8 oz)   Treatment Weight Change -1.2   Arterial bleeding stop time (min) 8 min   Venous bleeding stop time (min) 8 min   Post-Hemodialysis Comments no problems post tx

## 2024-01-07 NOTE — PLAN OF CARE
01/07/24 0707   Patient Assessment/Suction   Level of Consciousness (AVPU) alert   Respiratory Effort Normal;Unlabored   Expansion/Accessory Muscles/Retractions no use of accessory muscles   All Lung Fields Breath Sounds diminished;clear   Rhythm/Pattern, Respiratory pattern regular   Cough Frequency infrequent   Cough Type productive   Secretions Amount small   Secretions Color white   Secretions Characteristics thick   PRE-TX-O2   Device (Oxygen Therapy) room air   SpO2 100 %   Pulse Oximetry Type Intermittent   $ Pulse Oximetry - Single Charge Pulse Oximetry - Single   Pulse 65   Resp 17   Aerosol Therapy   $ Aerosol Therapy Charges Aerosol Treatment   Daily Review of Necessity (SVN) completed   Respiratory Treatment Status (SVN) given   Treatment Route (SVN) mask;oxygen   Patient Position (SVN) HOB elevated   Post Treatment Assessment (SVN) breath sounds unchanged   Signs of Intolerance (SVN) none   Breath Sounds Post-Respiratory Treatment   Throughout All Fields Post-Treatment All Fields   Throughout All Fields Post-Treatment no change   Post-treatment Heart Rate (beats/min) 68   Post-treatment Resp Rate (breaths/min) 19   General Safety Checklist   Safety Promotion/Fall Prevention side rails raised   Education   $ Education Bronchodilator;15 min

## 2024-01-08 VITALS
RESPIRATION RATE: 16 BRPM | DIASTOLIC BLOOD PRESSURE: 50 MMHG | WEIGHT: 110 LBS | TEMPERATURE: 99 F | OXYGEN SATURATION: 98 % | SYSTOLIC BLOOD PRESSURE: 126 MMHG | HEART RATE: 85 BPM | BODY MASS INDEX: 17.75 KG/M2

## 2024-01-08 LAB
ALBUMIN SERPL BCP-MCNC: 3.3 G/DL (ref 3.5–5.2)
ALP SERPL-CCNC: 47 U/L (ref 55–135)
ALT SERPL W/O P-5'-P-CCNC: 11 U/L (ref 10–44)
ANION GAP SERPL CALC-SCNC: 8 MMOL/L (ref 8–16)
AST SERPL-CCNC: 18 U/L (ref 10–40)
BASOPHILS # BLD AUTO: 0.03 K/UL (ref 0–0.2)
BASOPHILS NFR BLD: 0.5 % (ref 0–1.9)
BILIRUB SERPL-MCNC: 0.5 MG/DL (ref 0.1–1)
BUN SERPL-MCNC: 19 MG/DL (ref 8–23)
CALCIUM SERPL-MCNC: 8.4 MG/DL (ref 8.7–10.5)
CHLORIDE SERPL-SCNC: 107 MMOL/L (ref 95–110)
CO2 SERPL-SCNC: 24 MMOL/L (ref 23–29)
CREAT SERPL-MCNC: 5.6 MG/DL (ref 0.5–1.4)
DIFFERENTIAL METHOD BLD: ABNORMAL
EOSINOPHIL # BLD AUTO: 0.5 K/UL (ref 0–0.5)
EOSINOPHIL NFR BLD: 9.2 % (ref 0–8)
ERYTHROCYTE [DISTWIDTH] IN BLOOD BY AUTOMATED COUNT: 16.3 % (ref 11.5–14.5)
EST. GFR  (NO RACE VARIABLE): 7.1 ML/MIN/1.73 M^2
GLUCOSE SERPL-MCNC: 78 MG/DL (ref 70–110)
HCT VFR BLD AUTO: 26.9 % (ref 37–48.5)
HGB BLD-MCNC: 8 G/DL (ref 12–16)
IMM GRANULOCYTES # BLD AUTO: 0.04 K/UL (ref 0–0.04)
IMM GRANULOCYTES NFR BLD AUTO: 0.7 % (ref 0–0.5)
LYMPHOCYTES # BLD AUTO: 1.4 K/UL (ref 1–4.8)
LYMPHOCYTES NFR BLD: 24.1 % (ref 18–48)
MAGNESIUM SERPL-MCNC: 1.9 MG/DL (ref 1.6–2.6)
MCH RBC QN AUTO: 31.5 PG (ref 27–31)
MCHC RBC AUTO-ENTMCNC: 29.7 G/DL (ref 32–36)
MCV RBC AUTO: 106 FL (ref 82–98)
MONOCYTES # BLD AUTO: 1.2 K/UL (ref 0.3–1)
MONOCYTES NFR BLD: 20.6 % (ref 4–15)
NEUTROPHILS # BLD AUTO: 2.6 K/UL (ref 1.8–7.7)
NEUTROPHILS NFR BLD: 44.9 % (ref 38–73)
NRBC BLD-RTO: 0 /100 WBC
PHOSPHATE SERPL-MCNC: 3.5 MG/DL (ref 2.7–4.5)
PLATELET # BLD AUTO: 303 K/UL (ref 150–450)
PMV BLD AUTO: 9.6 FL (ref 9.2–12.9)
POTASSIUM SERPL-SCNC: 5.1 MMOL/L (ref 3.5–5.1)
PROT SERPL-MCNC: 6 G/DL (ref 6–8.4)
RBC # BLD AUTO: 2.54 M/UL (ref 4–5.4)
SODIUM SERPL-SCNC: 139 MMOL/L (ref 136–145)
WBC # BLD AUTO: 5.86 K/UL (ref 3.9–12.7)

## 2024-01-08 PROCEDURE — 94640 AIRWAY INHALATION TREATMENT: CPT

## 2024-01-08 PROCEDURE — 63600175 PHARM REV CODE 636 W HCPCS: Mod: JZ,JG | Performed by: INTERNAL MEDICINE

## 2024-01-08 PROCEDURE — 99900031 HC PATIENT EDUCATION (STAT)

## 2024-01-08 PROCEDURE — 97165 OT EVAL LOW COMPLEX 30 MIN: CPT

## 2024-01-08 PROCEDURE — 25000003 PHARM REV CODE 250: Performed by: INTERNAL MEDICINE

## 2024-01-08 PROCEDURE — 84100 ASSAY OF PHOSPHORUS: CPT | Performed by: PHYSICAL THERAPY ASSISTANT

## 2024-01-08 PROCEDURE — 36415 COLL VENOUS BLD VENIPUNCTURE: CPT | Performed by: PHYSICAL THERAPY ASSISTANT

## 2024-01-08 PROCEDURE — 25000242 PHARM REV CODE 250 ALT 637 W/ HCPCS: Performed by: PHYSICAL THERAPY ASSISTANT

## 2024-01-08 PROCEDURE — 83735 ASSAY OF MAGNESIUM: CPT | Performed by: PHYSICAL THERAPY ASSISTANT

## 2024-01-08 PROCEDURE — 25000003 PHARM REV CODE 250: Performed by: PHYSICAL THERAPY ASSISTANT

## 2024-01-08 PROCEDURE — 90935 HEMODIALYSIS ONE EVALUATION: CPT

## 2024-01-08 PROCEDURE — 80053 COMPREHEN METABOLIC PANEL: CPT | Performed by: PHYSICAL THERAPY ASSISTANT

## 2024-01-08 PROCEDURE — 85025 COMPLETE CBC W/AUTO DIFF WBC: CPT | Performed by: PHYSICAL THERAPY ASSISTANT

## 2024-01-08 PROCEDURE — 94761 N-INVAS EAR/PLS OXIMETRY MLT: CPT

## 2024-01-08 PROCEDURE — 97535 SELF CARE MNGMENT TRAINING: CPT

## 2024-01-08 RX ORDER — ONDANSETRON 4 MG/1
4 TABLET, ORALLY DISINTEGRATING ORAL ONCE
Status: COMPLETED | OUTPATIENT
Start: 2024-01-08 | End: 2024-01-08

## 2024-01-08 RX ORDER — SODIUM POLYSTYRENE SULFONATE 4.1 MEQ/G
15 POWDER, FOR SUSPENSION ORAL; RECTAL DAILY
Qty: 210 G | Refills: 0 | Status: SHIPPED | OUTPATIENT
Start: 2024-01-09 | End: 2024-01-23

## 2024-01-08 RX ORDER — POLYETHYLENE GLYCOL 3350 17 G/17G
17 POWDER, FOR SOLUTION ORAL DAILY
Qty: 14 EACH | Refills: 0 | Status: SHIPPED | OUTPATIENT
Start: 2024-01-09 | End: 2024-01-23

## 2024-01-08 RX ADMIN — ONDANSETRON 4 MG: 4 TABLET, ORALLY DISINTEGRATING ORAL at 06:01

## 2024-01-08 RX ADMIN — IPRATROPIUM BROMIDE AND ALBUTEROL SULFATE 3 ML: 2.5; .5 SOLUTION RESPIRATORY (INHALATION) at 01:01

## 2024-01-08 RX ADMIN — DORZOLAMIDE HYDROCHLORIDE AND TIMOLOL MALEATE 1 DROP: 20; 5 SOLUTION/ DROPS OPHTHALMIC at 09:01

## 2024-01-08 RX ADMIN — APIXABAN 2.5 MG: 2.5 TABLET, FILM COATED ORAL at 09:01

## 2024-01-08 RX ADMIN — DRONEDARONE 400 MG: 400 TABLET, FILM COATED ORAL at 08:01

## 2024-01-08 RX ADMIN — ATORVASTATIN CALCIUM 40 MG: 40 TABLET, FILM COATED ORAL at 09:01

## 2024-01-08 RX ADMIN — EPOETIN ALFA-EPBX 10000 UNITS: 10000 INJECTION, SOLUTION INTRAVENOUS; SUBCUTANEOUS at 10:01

## 2024-01-08 RX ADMIN — IPRATROPIUM BROMIDE AND ALBUTEROL SULFATE 3 ML: 2.5; .5 SOLUTION RESPIRATORY (INHALATION) at 07:01

## 2024-01-08 RX ADMIN — MIDODRINE HYDROCHLORIDE 10 MG: 2.5 TABLET ORAL at 09:01

## 2024-01-08 RX ADMIN — POLYETHYLENE GLYCOL 3350 17 G: 17 POWDER, FOR SOLUTION ORAL at 09:01

## 2024-01-08 RX ADMIN — SODIUM ZIRCONIUM CYCLOSILICATE 10 G: 10 POWDER, FOR SUSPENSION ORAL at 09:01

## 2024-01-08 NOTE — PLAN OF CARE
01/08/24 1131   Final Note   Assessment Type Final Discharge Note   Anticipated Discharge Disposition Home-Health   What phone number can be called within the next 1-3 days to see how you are doing after discharge? [phone]   Hospital Resources/Appts/Education Provided Post-Acute resouces added to AVS;Appointments scheduled and added to AVS   Post-Acute Status   Post-Acute Authorization Home Health   Home Health Status Set-up Complete/Auth obtained   Discharge Delays (!) Consult Recommendations Completed     Pt clear for discharge once dialysis completed.    Pt discharged with [facility] and will be seen 1/9/2024    Patient cleared for discharge from case management standpoint.    Follow up appointments scheduled and added to AVS.    Chart and discharge orders reviewed.  Patient discharged home with no further case management needs.

## 2024-01-08 NOTE — CARE UPDATE
01/07/24 1924   Patient Assessment/Suction   Level of Consciousness (AVPU) alert   Respiratory Effort Unlabored   Expansion/Accessory Muscles/Retractions no use of accessory muscles   All Lung Fields Breath Sounds diminished   Rhythm/Pattern, Respiratory unlabored   Cough Frequency infrequent   Cough Type productive   PRE-TX-O2   Device (Oxygen Therapy) room air   SpO2 99 %   Pulse Oximetry Type Intermittent   $ Pulse Oximetry - Multiple Charge Pulse Oximetry - Multiple   Pulse 65   Resp 18   Aerosol Therapy   $ Aerosol Therapy Charges Aerosol Treatment   Daily Review of Necessity (SVN) completed   Respiratory Treatment Status (SVN) given   Treatment Route (SVN) oxygen;mask   Patient Position (SVN) semi-Persaud's   Post Treatment Assessment (SVN) breath sounds unchanged   Signs of Intolerance (SVN) none   Breath Sounds Post-Respiratory Treatment   Throughout All Fields Post-Treatment All Fields   Throughout All Fields Post-Treatment no change   Post-treatment Heart Rate (beats/min) 68   Post-treatment Resp Rate (breaths/min) 20   Respiratory Evaluation   $ Care Plan Tech Time 15 min

## 2024-01-08 NOTE — CARE UPDATE
01/08/24 0703   Patient Assessment/Suction   Level of Consciousness (AVPU) alert   Respiratory Effort Normal;Unlabored   Expansion/Accessory Muscles/Retractions no use of accessory muscles   All Lung Fields Breath Sounds clear   Rhythm/Pattern, Respiratory unlabored   Cough Frequency infrequent   PRE-TX-O2   Device (Oxygen Therapy) room air   SpO2 99 %   Pulse Oximetry Type Intermittent   $ Pulse Oximetry - Multiple Charge Pulse Oximetry - Multiple   Pulse 61   Resp 16   Aerosol Therapy   $ Aerosol Therapy Charges Aerosol Treatment   Daily Review of Necessity (SVN) completed   Respiratory Treatment Status (SVN) given   Treatment Route (SVN) mask;oxygen   Patient Position (SVN) HOB elevated   Post Treatment Assessment (SVN) breath sounds unchanged   Signs of Intolerance (SVN) none   Breath Sounds Post-Respiratory Treatment   Throughout All Fields Post-Treatment All Fields   Throughout All Fields Post-Treatment no change   Post-treatment Heart Rate (beats/min) 65   Post-treatment Resp Rate (breaths/min) 17   Education   $ Education Bronchodilator;15 min

## 2024-01-08 NOTE — PROGRESS NOTES
HD Completed  UF 1.5 Liters     01/08/24 1640   Vital Signs   Pulse 85   Resp 16   BP (!) 126/50   During Hemodialysis Assessment   Blood Flow Rate (mL/min) 400 mL/min   Dialysate Flow Rate (mL/min) 700 ml/min   Ultrafiltration Rate (mL/Hr) 680 mL/Hr   Arteriovenous Lines Secure Yes   Arterial Pressure (mmHg) -180 mmHg   Venous Pressure (mmHg) 190   UF Removed (mL) 2000 mL   TMP 10   Venous Line in Air Detector Yes   Intake (mL) 0 mL   Transducer Dry Yes   Access Visible Yes    notified of access issue? N/A   Heparin given? N/A   Intra-Hemodialysis Comments nadn   Post-Hemodialysis Assessment   Rinseback Volume (mL) 250 mL   Blood Volume Processed (Liters) 59.7 L   Dialyzer Clearance Lightly streaked   Duration of Treatment 180 minutes   Additional Fluid Intake (mL) 500 mL   Total UF (mL) 2000 mL   Net Fluid Removal 1500   Patient Response to Treatment daniela well   Post-Treatment Weight 48.4 kg (106 lb 11.2 oz)   Treatment Weight Change -1.5   Arterial bleeding stop time (min) 5 min   Venous bleeding stop time (min) 54 min   Post-Hemodialysis Comments hd completed

## 2024-01-08 NOTE — PT/OT/SLP EVAL
Occupational Therapy   Evaluation    Name: Tyra Isaac  MRN: [id number]  Admitting Diagnosis: Atrial fibrillation with rapid ventricular response  Recent Surgery: * No surgery found *      Recommendations:     Discharge Recommendations: Low Intensity Therapy (with 24/7 supervision/assist)  Discharge Equipment Recommendations:  none  Barriers to discharge:  None    Assessment:     Tyra Isaac is a 83 y.o. female with a medical diagnosis of Atrial fibrillation with rapid ventricular response.  Pt agreeable to OT evaluation this AM. Performance deficits affecting function: weakness, impaired endurance, impaired self care skills, impaired functional mobility, gait instability, impaired balance, decreased safety awareness, impaired cardiopulmonary response to activity.      Rehab Prognosis: Good; patient would benefit from acute skilled OT services to address these deficits and reach maximum level of function.       Plan:     Patient to be seen 3 x/week to address the above listed problems via self-care/home management, therapeutic activities, therapeutic exercises  Plan of Care Expires: 02/08/24  Plan of Care Reviewed with: patient    Subjective     Chief Complaint: none stated  Patient/Family Comments/goals: none stated    Occupational Profile:  Living Environment: Pt lives with her son and DIL in a 1 story home with no KEY. Pt has a tub/shower combo with grab bars  Previous level of function: Mod I with ADLs and mobility. Relies on family for IADLs  Roles and Routines: mother  Equipment Used at Home: rollator, wheelchair, bedside commode, grab bar  Assistance upon Discharge: yes, from family    Pain/Comfort:  Pain Rating 1: 0/10    Patients cultural, spiritual, Mormon conflicts given the current situation:      Objective:     Communicated with: nursing prior to session.  Patient found HOB elevated with bed alarm, telemetry upon OT entry to room.    General Precautions: Standard, fall  Orthopedic  Precautions: N/A  Braces: N/A  Respiratory Status: Room air    Occupational Performance:    Bed Mobility:    Patient completed Supine to Sit with stand by assistance  Patient completed Sit to Supine with stand by assistance    Functional Mobility/Transfers:  Patient completed Sit <> Stand Transfer with contact guard assistance  with  rolling walker   Functional Mobility: pt amb in room with RW with CGA with no LOB or SOB    Activities of Daily Living:  Feeding:  independence per pt  Grooming: stand by assistance seated EOB  Lower Body Dressing: stand by assistance seated EOB to pull up socks    Cognitive/Visual Perceptual:  Cognitive/Psychosocial Skills:     -       Oriented to: Person, Place, and Time   -       Follows Commands/attention:Follows two-step commands  -       Communication: clear/fluent  -       Memory: No Deficits noted  -       Safety awareness/insight to disability: impaired   -       Mood/Affect/Coping skills/emotional control: Appropriate to situation, Cooperative, and Pleasant    Physical Exam:  Balance:    -       SBA seated balance; CGA standing balance  Upper Extremity Range of Motion:     -       Right Upper Extremity: WFL  -       Left Upper Extremity: WFL  Upper Extremity Strength:    -       Right Upper Extremity: WFL  -       Left Upper Extremity: WFL   Strength:    -       Right Upper Extremity: fair   -       Left Upper Extremity: fair   Fine Motor Coordination:    -       Intact  Gross motor coordination:   WFL    AMPAC 6 Click ADL:  AMPAC Total Score: 20    Treatment & Education:  Pt educated on role of OT/POC, importance of OOB/EOB activity, use of call bell, and safety during ADLs, transfers, and functional mobility.    Patient left HOB elevated with all lines intact, call button in reach, and bed alarm on    GOALS:   Multidisciplinary Problems       Occupational Therapy Goals          Problem: Occupational Therapy    Goal Priority Disciplines Outcome Interventions    Occupational Therapy Goal     OT, PT/OT     Description: Goals to be met by: 2/8/24     Patient will increase functional independence with ADLs by performing:    UE Dressing with Supervision.  LE Dressing with Supervision.  Grooming while standing at sink with Supervision.  Toileting from toilet with Supervision for hygiene and clothing management.   Toilet transfer to toilet with Supervision.                         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    Osteopenia of multiple sites 3/9/2018    Based upon bone density measurements. Patient also has chronic kidney disease.    Stroke 11/2016         Past Surgical History:   Procedure Laterality Date    ANGIOGRAM, CORONARY, WITH LEFT HEART CATHETERIZATION N/A 2/7/2022    Procedure: Angiogram, Coronary, with Left Heart Cath;  Surgeon: Gino Leal MD;  Location: [facility] CATH/EP LAB;  Service: Cardiology;  Laterality: N/A;    CARDIAC SURGERY      stents    EYE SURGERY      WRIST SURGERY         Time Tracking:     OT Date of Treatment: 01/08/24  OT Start Time: 0854  OT Stop Time: 0912  OT Total Time (min): 18 min    Billable Minutes:Evaluation 10  Self Care/Home Management 8    1/8/2024

## 2024-01-08 NOTE — PT/OT/SLP PROGRESS
Occupational Therapy      Patient Name:  Tyra Isaac   MRN:  [id number]    Patient not seen today secondary to  (2 attempts made, on first attempt, pt eating dinner and second attempt, nursing care.). Will follow-up 1/8/2024.    1/7/2024

## 2024-01-08 NOTE — DISCHARGE SUMMARY
[facility]  Discharge Summary      Patient Name: Tyra Isaac  MRN: [id number]  [facility]: [id number]  Patient Class: IP- Inpatient  Admission Date: 1/4/2024  Hospital Length of Stay: 1 days  Discharge Date and Time:  01/08/2024 4:41 PM  Attending Physician: Humble Yee MD   Discharging Provider: Humble Yee MD  Primary Care Provider: Kalpesh Goel MD    Primary Care Team: Networked reference to record PCT     HPI:   Pt is an 83-year-old  female with history of hypertension and ESRD on MWF. Pt presents to the ED today from PCP appointment due to episode of vomiting, rapid heart rate, and hypotension. At time of episode, pt complains of mild dyspnea and chest discomfort. Pt has history of hypotension during dialysis and is on midodrine.  On arrival to ED, pt found to be hypotensive of 70/54, fluid bolus given. Pt found to be in Afib RVR in ED HR of 130 and was started on diltiazem drip. Pt has history of afib and is on chronic eliquis. Pt was recently hospitalized for hypotension and afib and was subsequently discharged to SNF. Pt was discharged from SNF 2 days ago. Pt had last dialysis session yesterday. Hemoglobin 11.7, potassium 3.6, creatinine 6.0, BNP 2284, magnesium 2.1, lactic 2.2, and troponin 61.4.  Pt recently was diagnosed with covid 12/4/23 and continues to have viral cough. Pt does endorse nebulizer treatments have been helpful. Pt denies current chest pain, SOB, nausea, abdominal pain, or heart palpitations. Pt full code.     * No surgery found *      Hospital Course:   83F with PMH ESRD, Afib, HTN, and CAD is admitted due to hypotension and Afib with RVR requiring diltiazem infusion. Home midodrine given. She converted to normal sinus rhythm and her BP stabilized. Underwent dialysis with nephrology while inpatient. Remained fatigued. Noted to have recently left SNF due to having to pay for additional days. PT/OT and CM  consulted.    1/7/2024  Ms Isaac is feeling generally with no other complaints    1/8/2024  Ms Isaac is anxious to go home. Pt may be discharged from Nephrology's standpoint per Dr Drummond.   ROS: generalized weakness otherwise negative X 9  PE: in no distress HEENT MURRAY EOM intact moist mucus membranes Neck supple no use of accessory muscles Lungs no crackles wheezes or rhonchi Heart S 1 S 2 RRR no murmur Abdomen BS+ nontender Ext without CC or E pulses 1-2+ Skin no acute finding Neuro no acute sensory or motor deficit     Goals of Care Treatment Preferences:  Code Status: Full Code      Consults:   Consults (From admission, onward)          Status Ordering Provider     Inpatient consult to Social Work/Case Management  Once        Provider:  (Not yet assigned)    Completed LUCIANA ROMERO     Case Management/  Once        Provider:  (Not yet assigned)    Completed DEISI BEASLEY            No new Assessment & Plan notes have been filed under this hospital service since the last note was generated.  Service: Hospital Medicine    Final Active Diagnoses:    Diagnosis Date Noted POA    PRINCIPAL PROBLEM:  Atrial fibrillation with RVR with history of known paroxysmal atrial fibrillation [I48.91] 12/13/2019 Yes    Hypotension [I95.9] 02/24/2023 Yes    Elevated troponin [R79.89] 01/27/2022 Yes    Anemia of chronic disease [D63.8] 01/27/2022 Yes     Chronic    End stage renal disease on dialysis [N18.6, Z99.2] 06/25/2021 Not Applicable    Debility [R53.81] 06/25/2021 Yes    Multiple-type hyperlipidemia [E78.2] 12/20/2017 Yes      Problems Resolved During this Admission:    Diagnosis Date Noted Date Resolved POA    Elevated lactic acid level [R79.89] 01/04/2024 01/06/2024 Yes       Discharged Condition: good    Disposition: Home-Health Care c    Follow Up:   Contact information for follow-up providers       Kalpesh Goel MD. Go on 1/11/2024.    Specialty: Internal Medicine  Why: 10:20, [facility]  "follow up  Contact information:  [address] [facility]  [address]  [place] [address]  [phone]               Rose Maynard MD Follow up in 2 day(s).    Specialty: Nephrology  Why: office to contact  Contact information:  [address]  [facility]  [place] [address]  [phone]                       Contact information for after-discharge care       Home Medical Care       [facility] .    Service: Home Health Services  Why: will call to schedule  Contact information:  [address] [place] [address]  [phone]                                 Patient Instructions:      Basic metabolic panel   Standing Status: Future Standing Exp. Date: 03/08/25     Ambulatory referral/consult to Home Health   Standing Status: Future   Referral Priority: Routine Referral Type: Home Health   Referral Reason: Specialty Services Required   Requested Specialty: Home Health Services   Number of Visits Requested: 1     Diet Cardiac     Diet renal     Activity as tolerated       Significant Diagnostic Studies: Labs: BMP:   Recent Labs   Lab 01/07/24  0507 01/08/24 0343   GLU 63* 78    139   K 5.5* 5.1    107   CO2 22* 24   BUN 28* 19   CREATININE 7.2* 5.6*   CALCIUM 8.2* 8.4*   MG 2.0 1.9   , CMP   Recent Labs   Lab 01/07/24  0507 01/08/24 0343    139   K 5.5* 5.1    107   CO2 22* 24   GLU 63* 78   BUN 28* 19   CREATININE 7.2* 5.6*   CALCIUM 8.2* 8.4*   PROT 5.9* 6.0   ALBUMIN 3.3* 3.3*   BILITOT 0.6 0.5   ALKPHOS 44* 47*   AST 21 18   ALT 12 11   ANIONGAP 9 8   , CBC   Recent Labs   Lab 01/07/24  0507 01/08/24 0343   WBC 6.58 5.86   HGB 8.0* 8.0*   HCT 26.7* 26.9*    303   , INR   Lab Results   Component Value Date    INR 1.1 11/09/2023    INR 1.5 (H) 07/21/2023    INR 1.6 (H) 07/20/2023   , Troponin No results for input(s): "TROPONINI" in the last 168 hours., and All labs within the past 24 hours have been reviewed  Microbiology: Blood Culture   Lab " "Results   Component Value Date    LABBLOO No Growth to date 01/04/2024    LABBLOO No Growth to date 01/04/2024    LABBLOO No Growth to date 01/04/2024    LABBLOO No Growth to date 01/04/2024    LABBLOO No Growth to date 01/04/2024    and Urine Culture  No results found for: "LABURIN"    Pending Diagnostic Studies:       Procedure Component Value Units Date/Time    Troponin I High Sensitivity [[id number]] Collected: 01/04/24 1633    Order Status: Sent Lab Status: In process Updated: 01/04/24 1633    Specimen: Blood     Troponin I High Sensitivity [[id number]] Collected: 01/04/24 1633    Order Status: Sent Lab Status: In process Updated: 01/04/24 1633    Specimen: Blood            Medications:  Reconciled Home Medications:      Medication List        START taking these medications      polyethylene glycol 17 gram Pwpk  Commonly known as: GLYCOLAX  Take 17 g by mouth once daily. for 14 days  Start taking on: January 9, 2024     sodium polystyrene powder  Commonly known as: KAYEXALATE  Take 15 g by mouth once daily. for 14 days  Start taking on: January 9, 2024            CONTINUE taking these medications      atorvastatin 40 MG tablet  Commonly known as: LIPITOR  Take 40 mg by mouth once daily.     b complex vitamins tablet  Take 1 tablet by mouth once daily.     dorzolamide-timolol 2-0.5% 22.3-6.8 mg/mL ophthalmic solution  Commonly known as: COSOPT  Place 1 drop into both eyes 2 (two) times daily.     dronedarone 400 mg Tab  Commonly known as: MULTAQ  Take 1 tablet (400 mg total) by mouth 2 (two) times daily with meals.     ELIQUIS 2.5 mg Tab  Generic drug: apixaban  Take 2.5 mg by mouth 2 (two) times daily.     latanoprost 0.005 % ophthalmic solution  Place 1 drop into both eyes every evening.     levalbuterol 0.63 mg/3 mL nebulizer solution  Commonly known as: XOPENEX  Take 1 ampule by nebulization every 4 to 6 hours as needed for Wheezing or Shortness of Breath.     loperamide 2 mg Tab  Commonly known as: IMODIUM " A-D  Take 1 tablet (2 mg total) by mouth 4 (four) times daily as needed (diarrhea).     midodrine 10 MG tablet  Commonly known as: PROAMATINE  Take 1 tablet (10 mg total) by mouth 3 (three) times daily with meals.     ondansetron 4 MG tablet  Commonly known as: ZOFRAN  Take 1 tablet (4 mg total) by mouth every 8 (eight) hours as needed for Nausea.     senna-docusate 8.6-50 mg 8.6-50 mg per tablet  Commonly known as: PERICOLACE  Take 1 tablet by mouth 2 (two) times daily as needed for Constipation.            STOP taking these medications      predniSONE 20 MG tablet  Commonly known as: DELTASONE              Indwelling Lines/Drains at time of discharge:   Lines/Drains/Airways       Drain  Duration                  Hemodialysis AV Fistula Right upper arm -- days                    Time spent on the discharge of patient: 37 minutes         Humble Yee MD  Department of Hospital Medicine  Critical access hospital

## 2024-01-08 NOTE — PROGRESS NOTES
INPATIENT NEPHROLOGY Progress Note   [facility]    Patient Name: Tyra Isaac  Date: 01/08/2024    Reason for consultation: ESRD    Chief Complaint:   Chief Complaint   Patient presents with    Vomiting    Nausea    Shortness of Breath    Chest Pain     History of Present Illness:  82 y/o F with ESRD on HD MWF who p/w PMD office with n/v, found to be hypotensive with AF with RVR, currently on cardizem gtt in ER, awaiting admission, looks frail/tired, had HD yest with some breathing difficulty she reports, but completed treatment, no exac/reliev factors, consulted for dialysis.    Interval History:  1/4- on cardizem- -140, CXR clear  1/5 VSS. Seen on HD today.  1/6 VSS, no new complains. Feels tired and weak.  1/7 VSS. Hyperkalemia, despite Lokelma and low K diet. Will do a short HD today and then in AM.  1/8 seen on HD    Plan of Care:    ESRD on HD MWF  Chronic hypotension- 7/2023 echo with pEF, no DD, MR, pulm HTN  AF with RVR- trop leak, BNP > 2000 (worse compared to prior)  Hyperkalemia  Lactic acidosis   Secondary HPT  Anemia of CKD    Plan:    - continue MWF  - continue midodrine  - attempt UF as tolerated  - hypokalemia/hyperkalemia can also exacerbate AF- adjust dialysate as needed- Mg is replete  - continue BRAYAN with HD    Thank you for allowing us to participate in this patient's care. We will continue to follow.    Vital Signs:  Temp Readings from Last 3 Encounters:   01/08/24 98.8 °F (37.1 °C) (Oral)   12/21/23 98.6 °F (37 °C) (Oral)   12/15/23 98.1 °F (36.7 °C)       Pulse Readings from Last 3 Encounters:   01/08/24 71   01/04/24 109   12/21/23 60       BP Readings from Last 3 Encounters:   01/08/24 (!) 110/47   01/04/24 128/72   12/21/23 (!) 112/41       Weight:  Wt Readings from Last 3 Encounters:   01/04/24 49.9 kg (110 lb)   01/05/24 49.9 kg (110 lb 0.2 oz)   01/04/24 45.4 kg (100 lb)       Medications:  Scheduled Meds:   albuterol-ipratropium  3 mL Nebulization Q6H  WAKE    apixaban  2.5 mg Oral BID    atorvastatin  40 mg Oral Daily    dorzolamide-timolol 2-0.5%  1 drop Both Eyes BID    dronedarone  400 mg Oral BID WM    epoetin rosy-epbx  10,000 Units Subcutaneous Every Mon, Wed, Fri    latanoprost  1 drop Both Eyes QHS    midodrine  10 mg Oral TID    polyethylene glycol  17 g Oral Daily    sodium zirconium cyclosilicate  10 g Oral Daily     Continuous Infusions:      PRN Meds:.  No current facility-administered medications on file prior to encounter.     Current Outpatient Medications on File Prior to Encounter   Medication Sig Dispense Refill    atorvastatin (LIPITOR) 40 MG tablet Take 40 mg by mouth once daily.      b complex vitamins tablet Take 1 tablet by mouth once daily.      dorzolamide-timolol 2-0.5% (COSOPT) 22.3-6.8 mg/mL ophthalmic solution Place 1 drop into both eyes 2 (two) times daily.      dronedarone (MULTAQ) 400 mg Tab Take 1 tablet (400 mg total) by mouth 2 (two) times daily with meals. 180 tablet 0    ELIQUIS 2.5 mg Tab Take 2.5 mg by mouth 2 (two) times daily.      latanoprost 0.005 % ophthalmic solution Place 1 drop into both eyes every evening.      levalbuterol (XOPENEX) 0.63 mg/3 mL nebulizer solution Take 1 ampule by nebulization every 4 to 6 hours as needed for Wheezing or Shortness of Breath.      loperamide (IMODIUM A-D) 2 mg Tab Take 1 tablet (2 mg total) by mouth 4 (four) times daily as needed (diarrhea). 3 tablet 0    midodrine (PROAMATINE) 10 MG tablet Take 1 tablet (10 mg total) by mouth 3 (three) times daily with meals. 270 tablet 0    ondansetron (ZOFRAN) 4 MG tablet Take 1 tablet (4 mg total) by mouth every 8 (eight) hours as needed for Nausea. 15 tablet 0    senna-docusate 8.6-50 mg (PERICOLACE) 8.6-50 mg per tablet Take 1 tablet by mouth 2 (two) times daily as needed for Constipation. 30 tablet 0    [DISCONTINUED] predniSONE (DELTASONE) 20 MG tablet Take 20 mg by mouth once daily.         Review of Systems:  Neg    Physical  "Exam:  General Appearance:    NAD, AAO x 3, cooperative, appears stated age   Head:    Normocephalic, atraumatic   Eyes:    PER, EOMI, and conjunctiva/sclera clear bilaterally       Mouth:   Moist mucus membranes, no thrush or oral lesions,       normal dentition   Back:     No CVA tenderness   Lungs:     Clear to auscultation bilaterally, no wheezes, crackles,           rales or rhonchi, symmetric air movement, respirations unlabored   Chest wall:    No tenderness or deformity   Heart:    Irreg irreg, S1 and S2 normal, no murmur, rub   or gallop   Abdomen:     Soft, non-tender, non-distended, bowel sounds active all four   quadrants, no RT or guarding, no masses, no organomegaly   Extremities:   Warm and well perfused, distal pulses are intact, no             cyanosis or peripheral edema   MSK:   No joint or muscle swelling, tenderness or deformity   Skin:   Skin color, texture, turgor normal, no rashes or lesions   Neurologic/Psychiatric:   CNII-XII intact, normal strength and sensation       throughout, no asterixis; normal affect, memory, judgement     and insight      Results:  Recent Labs   Lab 01/06/24  0303 01/07/24  0507 01/08/24  0343    140 139   K 4.6 5.5* 5.1    109 107   CO2 23 22* 24   BUN 16 28* 19   CREATININE 4.9* 7.2* 5.6*   GLU 90 63* 78         Recent Labs   Lab 01/06/24  0303 01/07/24  0507 01/08/24  0343   CALCIUM 8.0* 8.2* 8.4*   ALBUMIN 3.3* 3.3* 3.3*   PHOS 2.9 3.8 3.5   MG 2.0 2.0 1.9               No results for input(s): "POCTGLUCOSE" in the last 168 hours.    Recent Labs   Lab 01/26/22  1525   Hemoglobin A1C 5.3         Recent Labs   Lab 01/06/24  0304 01/07/24  0507 01/08/24  0343   WBC 5.13 6.58 5.86   HGB 8.3* 8.0* 8.0*   HCT 27.6* 26.7* 26.9*    351 303   * 104* 106*   MCHC 30.1* 30.0* 29.7*   MONO 20.3*  1.0 18.7*  1.2* 20.6*  1.2*   EOSINOPHIL 9.6* 7.9 9.2*         Recent Labs   Lab 01/06/24  0303 01/07/24  0507 01/08/24  0343   BILITOT 0.5 0.6 0.5 "   PROT 6.3 5.9* 6.0   ALBUMIN 3.3* 3.3* 3.3*   ALKPHOS 49* 44* 47*   ALT 10 12 11   AST 20 21 18         Recent Labs   Lab 11/09/23 2028 12/04/23  0959 12/20/23  1247   Color, UA Yellow Yellow Yellow   Appearance, UA Clear Clear Clear   pH, UA >8.0 A 8.0 6.0   Specific Dorr, UA 1.015 1.015 1.015   Protein, UA 2+ A 2+ A 2+ A   Glucose, UA Trace A 1+ A Negative   Ketones, UA Negative Trace A Negative   Urobilinogen, UA 2.0-3.0 A Negative Negative   Bilirubin (UA) Negative Negative Negative   Occult Blood UA Trace A 2+ A Negative   Nitrite, UA Negative Negative Negative   RBC, UA 1 41 H 0   WBC, UA 0 2 3   Bacteria Negative Negative Rare   Hyaline Casts, UA 3 A 6 A 8 A         Recent Labs   Lab 11/10/23  1518 11/10/23  1529 12/20/23  1207   POC PH 7.395 7.391 7.390   POC PCO2 36.5 37.2 37.3   POC HCO3 22.4 L 22.6 L 22.6 L   POC PO2 54 LL 53 LL 99   POC SATURATED O2 88 87 98   POC BE -3 L -2 -2   Sample ARTERIAL ARTERIAL ARTERIAL         Microbiology Results (last 7 days)       Procedure Component Value Units Date/Time    Blood culture #1 **CANNOT BE ORDERED STAT** [[id number]] Collected: 01/04/24 1138    Order Status: Completed Specimen: Blood from Antecubital, Left Updated: 01/07/24 1432     Blood Culture, Routine No Growth to date      No Growth to date      No Growth to date      No Growth to date    Blood culture #2 **CANNOT BE ORDERED STAT** [[id number]] Collected: 01/04/24 1214    Order Status: Completed Specimen: Blood from Antecubital, Left Updated: 01/07/24 1432     Blood Culture, Routine No Growth to date      No Growth to date      No Growth to date      No Growth to date           have spent >35 minutes providing care for this patient for the above diagnoses. These services have included chart/data/imaging review, evaluation, exam, formulation of plan, , note preparation, and discussions with staff involved in this patient's care.    Rose Davis MD  [facility]  [address] [place] [address]  [phone] (p)  [phone] (f)

## 2024-01-08 NOTE — PT/OT/SLP PROGRESS
Physical Therapy      Patient Name:  Tyra Isaac   MRN:  [id number]    Patient not seen today secondary to Other (Comment) (Pt declined to participate. Awaiting dialysis and discharge.). Will follow-up 1/9/23.

## 2024-01-09 ENCOUNTER — PATIENT OUTREACH (OUTPATIENT)
Dept: ADMINISTRATIVE | Facility: CLINIC | Age: 84
End: 2024-01-09
Payer: MEDICARE

## 2024-01-09 LAB
BACTERIA BLD CULT: NORMAL
BACTERIA BLD CULT: NORMAL

## 2024-01-09 NOTE — PROGRESS NOTES
C3 nurse spoke with Tyra Isaac's son for a TCC post hospital discharge follow up call. The patient has a scheduled Kalpesh Goel MD [facility] (Internal Medicine) on 1/11/20

## 2024-01-10 ENCOUNTER — TELEPHONE (OUTPATIENT)
Dept: FAMILY MEDICINE | Facility: CLINIC | Age: 84
End: 2024-01-10
Payer: MEDICARE

## 2024-01-10 DIAGNOSIS — J18.9 PNEUMONIA OF BOTH LOWER LOBES DUE TO INFECTIOUS ORGANISM: Primary | ICD-10-CM

## 2024-01-10 DIAGNOSIS — R06.2 WHEEZING: ICD-10-CM

## 2024-01-10 RX ORDER — ALBUTEROL SULFATE 90 UG/1
2 AEROSOL, METERED RESPIRATORY (INHALATION) EVERY 6 HOURS PRN
Qty: 8 G | Refills: 2 | Status: SHIPPED | OUTPATIENT
Start: 2024-01-10

## 2024-01-10 NOTE — TELEPHONE ENCOUNTER
HH nurse called and stated pt needs orders for a nebulizer. She also wanted to let you know pt has high pitched wheezing, rhonchi and coarse breath sounds to all lobes of right lung. Has a productive cough with yellow tinged thick sputum and is afebrile. She is scheduled to see you on tomorrow.  nurse would like a call back.     Pneumonia of both lower lobes due to infectious organism  -     NEBULIZER KIT (SUPPLIES) FOR HOME USE  -     albuterol (VENTOLIN HFA) 90 mcg/actuation inhaler; Inhale 2 puffs into the lungs every 6 (six) hours as needed for Wheezing. Rescue  Dispense: 8 g; Refill: 2    Wheezing  -     NEBULIZER KIT (SUPPLIES) FOR HOME USE  -     albuterol (VENTOLIN HFA) 90 mcg/actuation inhaler; Inhale 2 puffs into the lungs every 6 (six) hours as needed for Wheezing. Rescue  Dispense: 8 g; Refill: 2

## 2024-01-11 ENCOUNTER — DOCUMENT SCAN (OUTPATIENT)
Dept: HOME HEALTH SERVICES | Facility: HOSPITAL | Age: 84
End: 2024-01-11
Payer: MEDICARE

## 2024-01-11 ENCOUNTER — OFFICE VISIT (OUTPATIENT)
Dept: FAMILY MEDICINE | Facility: CLINIC | Age: 84
End: 2024-01-11
Payer: MEDICARE

## 2024-01-11 VITALS
DIASTOLIC BLOOD PRESSURE: 53 MMHG | HEART RATE: 109 BPM | BODY MASS INDEX: 15.59 KG/M2 | HEIGHT: 66 IN | WEIGHT: 97 LBS | SYSTOLIC BLOOD PRESSURE: 87 MMHG

## 2024-01-11 DIAGNOSIS — I48.0 PAROXYSMAL ATRIAL FIBRILLATION: ICD-10-CM

## 2024-01-11 DIAGNOSIS — J18.9 PNEUMONIA OF BOTH LOWER LOBES DUE TO INFECTIOUS ORGANISM: ICD-10-CM

## 2024-01-11 DIAGNOSIS — N18.6 STAGE 5 CHRONIC KIDNEY DISEASE ON CHRONIC DIALYSIS: ICD-10-CM

## 2024-01-11 DIAGNOSIS — G31.84 MILD COGNITIVE IMPAIRMENT: ICD-10-CM

## 2024-01-11 DIAGNOSIS — I25.118 CORONARY ARTERY DISEASE OF NATIVE HEART WITH STABLE ANGINA PECTORIS, UNSPECIFIED VESSEL OR LESION TYPE: ICD-10-CM

## 2024-01-11 DIAGNOSIS — Z99.2 STAGE 5 CHRONIC KIDNEY DISEASE ON CHRONIC DIALYSIS: ICD-10-CM

## 2024-01-11 DIAGNOSIS — I95.9 HYPOTENSION, UNSPECIFIED HYPOTENSION TYPE: ICD-10-CM

## 2024-01-11 DIAGNOSIS — R11.2 NAUSEA AND VOMITING, UNSPECIFIED VOMITING TYPE: Primary | ICD-10-CM

## 2024-01-11 PROCEDURE — 3288F FALL RISK ASSESSMENT DOCD: CPT | Mod: HCNC,CPTII,S$GLB, | Performed by: INTERNAL MEDICINE

## 2024-01-11 PROCEDURE — 3078F DIAST BP <80 MM HG: CPT | Mod: HCNC,CPTII,S$GLB, | Performed by: INTERNAL MEDICINE

## 2024-01-11 PROCEDURE — 3074F SYST BP LT 130 MM HG: CPT | Mod: HCNC,CPTII,S$GLB, | Performed by: INTERNAL MEDICINE

## 2024-01-11 PROCEDURE — 99999 PR PBB SHADOW E&M-EST. PATIENT-LVL III: CPT | Mod: PBBFAC,HCNC,, | Performed by: INTERNAL MEDICINE

## 2024-01-11 PROCEDURE — 1160F RVW MEDS BY RX/DR IN RCRD: CPT | Mod: HCNC,CPTII,S$GLB, | Performed by: INTERNAL MEDICINE

## 2024-01-11 PROCEDURE — 1111F DSCHRG MED/CURRENT MED MERGE: CPT | Mod: HCNC,CPTII,S$GLB, | Performed by: INTERNAL MEDICINE

## 2024-01-11 PROCEDURE — 1159F MED LIST DOCD IN RCRD: CPT | Mod: HCNC,CPTII,S$GLB, | Performed by: INTERNAL MEDICINE

## 2024-01-11 PROCEDURE — 1126F AMNT PAIN NOTED NONE PRSNT: CPT | Mod: HCNC,CPTII,S$GLB, | Performed by: INTERNAL MEDICINE

## 2024-01-11 PROCEDURE — 1100F PTFALLS ASSESS-DOCD GE2>/YR: CPT | Mod: HCNC,CPTII,S$GLB, | Performed by: INTERNAL MEDICINE

## 2024-01-11 PROCEDURE — 99215 OFFICE O/P EST HI 40 MIN: CPT | Mod: HCNC,S$GLB,, | Performed by: INTERNAL MEDICINE

## 2024-01-11 RX ORDER — ONDANSETRON 4 MG/1
4 TABLET, ORALLY DISINTEGRATING ORAL EVERY 6 HOURS PRN
Qty: 30 TABLET | Refills: 5 | Status: SHIPPED | OUTPATIENT
Start: 2024-01-11
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
## 2024-01-11 NOTE — Clinical Note
Saw this patient today in the office.  Continues to feel ill and sick.  Nausea and vomiting.  Not sure what is the cause and if it is secondary to any medications like Multaq or nebulizers with albuterol.  Prescribed some Zofran ODT.  Also advised family to check with Dr. Thompson if this could be because of Multaq.  Or some gastroparesis  Lot of loose ends with the patient at this point.  Was discharged from the hospital without a nebulizer machine but with nebulizer solutions.  Cardiologist not aware what is going on-Dr. Thompson.    Dr. Manasa SOSA

## 2024-02-01 ENCOUNTER — EXTERNAL HOME HEALTH (OUTPATIENT)
Dept: HOME HEALTH SERVICES | Facility: HOSPITAL | Age: 84
End: 2024-02-01
Payer: MEDICARE

## 2024-02-14 ENCOUNTER — DOCUMENT SCAN (OUTPATIENT)
Dept: HOME HEALTH SERVICES | Facility: HOSPITAL | Age: 84
End: 2024-02-14
Payer: MEDICARE

## 2024-02-27 DIAGNOSIS — Z00.00 ENCOUNTER FOR MEDICARE ANNUAL WELLNESS EXAM: ICD-10-CM

## 2024-03-07 ENCOUNTER — DOCUMENT SCAN (OUTPATIENT)
Dept: HOME HEALTH SERVICES | Facility: HOSPITAL | Age: 84
End: 2024-03-07
Payer: MEDICARE

## 2024-03-09 ENCOUNTER — DOCUMENT SCAN (OUTPATIENT)
Dept: HOME HEALTH SERVICES | Facility: HOSPITAL | Age: 84
End: 2024-03-09
Payer: MEDICARE

## 2024-03-15 ENCOUNTER — HOSPITAL ENCOUNTER (INPATIENT)
Facility: HOSPITAL | Age: 84
LOS: 2 days | Discharge: HOME HEALTH | DRG: 193 | End: 2024-03-19
Attending: EMERGENCY MEDICINE | Admitting: INTERNAL MEDICINE
Payer: MEDICARE

## 2024-03-15 DIAGNOSIS — R07.9 CHEST PAIN: ICD-10-CM

## 2024-03-15 DIAGNOSIS — R00.0 TACHYCARDIA: ICD-10-CM

## 2024-03-15 DIAGNOSIS — N18.6 ESRD (END STAGE RENAL DISEASE): Primary | ICD-10-CM

## 2024-03-15 LAB
ALBUMIN SERPL BCP-MCNC: 3.5 G/DL (ref 3.5–5.2)
ALP SERPL-CCNC: 48 U/L (ref 55–135)
ALT SERPL W/O P-5'-P-CCNC: 12 U/L (ref 10–44)
ANION GAP SERPL CALC-SCNC: 9 MMOL/L (ref 8–16)
AST SERPL-CCNC: 19 U/L (ref 10–40)
BASOPHILS # BLD AUTO: 0.03 K/UL (ref 0–0.2)
BASOPHILS NFR BLD: 0.4 % (ref 0–1.9)
BILIRUB SERPL-MCNC: 0.4 MG/DL (ref 0.1–1)
BNP SERPL-MCNC: 1039 PG/ML (ref 0–99)
BUN SERPL-MCNC: 21 MG/DL (ref 8–23)
CALCIUM SERPL-MCNC: 8.8 MG/DL (ref 8.7–10.5)
CHLORIDE SERPL-SCNC: 96 MMOL/L (ref 95–110)
CK SERPL-CCNC: 52 U/L (ref 20–180)
CO2 SERPL-SCNC: 33 MMOL/L (ref 23–29)
CREAT SERPL-MCNC: 4.7 MG/DL (ref 0.5–1.4)
DIFFERENTIAL METHOD BLD: ABNORMAL
EOSINOPHIL # BLD AUTO: 0.6 K/UL (ref 0–0.5)
EOSINOPHIL NFR BLD: 7.1 % (ref 0–8)
ERYTHROCYTE [DISTWIDTH] IN BLOOD BY AUTOMATED COUNT: 14.5 % (ref 11.5–14.5)
EST. GFR  (NO RACE VARIABLE): 8.7 ML/MIN/1.73 M^2
GLUCOSE SERPL-MCNC: 101 MG/DL (ref 70–110)
GROUP A STREP, MOLECULAR: NEGATIVE
HCT VFR BLD AUTO: 32.2 % (ref 37–48.5)
HGB BLD-MCNC: 9.9 G/DL (ref 12–16)
IMM GRANULOCYTES # BLD AUTO: 0.04 K/UL (ref 0–0.04)
IMM GRANULOCYTES NFR BLD AUTO: 0.5 % (ref 0–0.5)
INFLUENZA A, MOLECULAR: NEGATIVE
INFLUENZA B, MOLECULAR: NEGATIVE
LIPASE SERPL-CCNC: 58 U/L (ref 4–60)
LYMPHOCYTES # BLD AUTO: 1 K/UL (ref 1–4.8)
LYMPHOCYTES NFR BLD: 12.2 % (ref 18–48)
MAGNESIUM SERPL-MCNC: 2 MG/DL (ref 1.6–2.6)
MCH RBC QN AUTO: 30 PG (ref 27–31)
MCHC RBC AUTO-ENTMCNC: 30.7 G/DL (ref 32–36)
MCV RBC AUTO: 98 FL (ref 82–98)
MONOCYTES # BLD AUTO: 1.4 K/UL (ref 0.3–1)
MONOCYTES NFR BLD: 18.1 % (ref 4–15)
NEUTROPHILS # BLD AUTO: 4.8 K/UL (ref 1.8–7.7)
NEUTROPHILS NFR BLD: 61.7 % (ref 38–73)
NRBC BLD-RTO: 0 /100 WBC
PLATELET # BLD AUTO: 395 K/UL (ref 150–450)
PMV BLD AUTO: 10 FL (ref 9.2–12.9)
POTASSIUM SERPL-SCNC: 3.5 MMOL/L (ref 3.5–5.1)
PROT SERPL-MCNC: 7.9 G/DL (ref 6–8.4)
RBC # BLD AUTO: 3.3 M/UL (ref 4–5.4)
SARS-COV-2 RDRP RESP QL NAA+PROBE: NEGATIVE
SODIUM SERPL-SCNC: 138 MMOL/L (ref 136–145)
SPECIMEN SOURCE: NORMAL
TROPONIN I SERPL HS-MCNC: 19.2 PG/ML (ref 0–14.9)
TROPONIN I SERPL HS-MCNC: 28.8 PG/ML (ref 0–14.9)
TROPONIN I SERPL HS-MCNC: 41.7 PG/ML (ref 0–14.9)
TSH SERPL DL<=0.005 MIU/L-ACNC: 0.89 UIU/ML (ref 0.34–5.6)
WBC # BLD AUTO: 7.77 K/UL (ref 3.9–12.7)

## 2024-03-15 PROCEDURE — 93005 ELECTROCARDIOGRAM TRACING: CPT | Performed by: GENERAL PRACTICE

## 2024-03-15 PROCEDURE — 84484 ASSAY OF TROPONIN QUANT: CPT | Performed by: EMERGENCY MEDICINE

## 2024-03-15 PROCEDURE — 36415 COLL VENOUS BLD VENIPUNCTURE: CPT

## 2024-03-15 PROCEDURE — U0002 COVID-19 LAB TEST NON-CDC: HCPCS | Performed by: EMERGENCY MEDICINE

## 2024-03-15 PROCEDURE — 83735 ASSAY OF MAGNESIUM: CPT | Performed by: EMERGENCY MEDICINE

## 2024-03-15 PROCEDURE — 25000003 PHARM REV CODE 250

## 2024-03-15 PROCEDURE — 87502 INFLUENZA DNA AMP PROBE: CPT | Performed by: EMERGENCY MEDICINE

## 2024-03-15 PROCEDURE — 83690 ASSAY OF LIPASE: CPT | Performed by: EMERGENCY MEDICINE

## 2024-03-15 PROCEDURE — 87651 STREP A DNA AMP PROBE: CPT | Performed by: EMERGENCY MEDICINE

## 2024-03-15 PROCEDURE — 80053 COMPREHEN METABOLIC PANEL: CPT | Performed by: EMERGENCY MEDICINE

## 2024-03-15 PROCEDURE — 82550 ASSAY OF CK (CPK): CPT | Performed by: EMERGENCY MEDICINE

## 2024-03-15 PROCEDURE — 84443 ASSAY THYROID STIM HORMONE: CPT | Performed by: EMERGENCY MEDICINE

## 2024-03-15 PROCEDURE — 85025 COMPLETE CBC W/AUTO DIFF WBC: CPT | Performed by: EMERGENCY MEDICINE

## 2024-03-15 PROCEDURE — 63600175 PHARM REV CODE 636 W HCPCS

## 2024-03-15 PROCEDURE — 99285 EMERGENCY DEPT VISIT HI MDM: CPT | Mod: 25

## 2024-03-15 PROCEDURE — 83880 ASSAY OF NATRIURETIC PEPTIDE: CPT | Performed by: EMERGENCY MEDICINE

## 2024-03-15 PROCEDURE — 25000003 PHARM REV CODE 250: Performed by: EMERGENCY MEDICINE

## 2024-03-15 PROCEDURE — 96365 THER/PROPH/DIAG IV INF INIT: CPT

## 2024-03-15 PROCEDURE — 96375 TX/PRO/DX INJ NEW DRUG ADDON: CPT

## 2024-03-15 PROCEDURE — 84484 ASSAY OF TROPONIN QUANT: CPT | Mod: 91

## 2024-03-15 PROCEDURE — G0378 HOSPITAL OBSERVATION PER HR: HCPCS

## 2024-03-15 PROCEDURE — 93010 ELECTROCARDIOGRAM REPORT: CPT | Mod: 76,,, | Performed by: GENERAL PRACTICE

## 2024-03-15 RX ORDER — DORZOLAMIDE HYDROCHLORIDE AND TIMOLOL MALEATE 20; 5 MG/ML; MG/ML
1 SOLUTION/ DROPS OPHTHALMIC 2 TIMES DAILY
Status: DISCONTINUED | OUTPATIENT
Start: 2024-03-15 | End: 2024-03-19 | Stop reason: HOSPADM

## 2024-03-15 RX ORDER — LATANOPROST 50 UG/ML
1 SOLUTION/ DROPS OPHTHALMIC NIGHTLY
Status: DISCONTINUED | OUTPATIENT
Start: 2024-03-15 | End: 2024-03-19 | Stop reason: HOSPADM

## 2024-03-15 RX ORDER — NITROGLYCERIN 0.4 MG/1
0.4 TABLET SUBLINGUAL EVERY 5 MIN PRN
Status: DISCONTINUED | OUTPATIENT
Start: 2024-03-15 | End: 2024-03-19 | Stop reason: HOSPADM

## 2024-03-15 RX ORDER — MIDODRINE HYDROCHLORIDE 2.5 MG/1
5 TABLET ORAL 3 TIMES DAILY
Status: DISCONTINUED | OUTPATIENT
Start: 2024-03-15 | End: 2024-03-17

## 2024-03-15 RX ORDER — CALCIUM ACETATE 667 MG/1
667 CAPSULE ORAL
COMMUNITY

## 2024-03-15 RX ORDER — TALC
6 POWDER (GRAM) TOPICAL NIGHTLY PRN
Status: DISCONTINUED | OUTPATIENT
Start: 2024-03-15 | End: 2024-03-19 | Stop reason: HOSPADM

## 2024-03-15 RX ORDER — AMOXICILLIN 250 MG
1 CAPSULE ORAL DAILY PRN
Status: DISCONTINUED | OUTPATIENT
Start: 2024-03-15 | End: 2024-03-19 | Stop reason: HOSPADM

## 2024-03-15 RX ORDER — ACETAMINOPHEN 500 MG
1000 TABLET ORAL
Status: COMPLETED | OUTPATIENT
Start: 2024-03-15 | End: 2024-03-15

## 2024-03-15 RX ORDER — CALCIUM ACETATE 667 MG/1
667 CAPSULE ORAL
Status: DISCONTINUED | OUTPATIENT
Start: 2024-03-15 | End: 2024-03-19 | Stop reason: HOSPADM

## 2024-03-15 RX ORDER — MIDODRINE HYDROCHLORIDE 2.5 MG/1
5 TABLET ORAL ONCE
Status: DISCONTINUED | OUTPATIENT
Start: 2024-03-15 | End: 2024-03-16

## 2024-03-15 RX ORDER — NALOXONE HCL 0.4 MG/ML
0.02 VIAL (ML) INJECTION
Status: DISCONTINUED | OUTPATIENT
Start: 2024-03-15 | End: 2024-03-19 | Stop reason: HOSPADM

## 2024-03-15 RX ORDER — ALUMINUM HYDROXIDE, MAGNESIUM HYDROXIDE, AND SIMETHICONE 1200; 120; 1200 MG/30ML; MG/30ML; MG/30ML
30 SUSPENSION ORAL 4 TIMES DAILY PRN
Status: DISCONTINUED | OUTPATIENT
Start: 2024-03-15 | End: 2024-03-19 | Stop reason: HOSPADM

## 2024-03-15 RX ORDER — ACETAMINOPHEN 325 MG/1
650 TABLET ORAL EVERY 4 HOURS PRN
Status: DISCONTINUED | OUTPATIENT
Start: 2024-03-15 | End: 2024-03-19 | Stop reason: HOSPADM

## 2024-03-15 RX ORDER — SODIUM CHLORIDE 0.9 % (FLUSH) 0.9 %
10 SYRINGE (ML) INJECTION EVERY 12 HOURS PRN
Status: DISCONTINUED | OUTPATIENT
Start: 2024-03-15 | End: 2024-03-19 | Stop reason: HOSPADM

## 2024-03-15 RX ORDER — ATORVASTATIN CALCIUM 40 MG/1
40 TABLET, FILM COATED ORAL DAILY
Status: DISCONTINUED | OUTPATIENT
Start: 2024-03-16 | End: 2024-03-19 | Stop reason: HOSPADM

## 2024-03-15 RX ORDER — ONDANSETRON HYDROCHLORIDE 2 MG/ML
4 INJECTION, SOLUTION INTRAVENOUS EVERY 6 HOURS PRN
Status: DISCONTINUED | OUTPATIENT
Start: 2024-03-15 | End: 2024-03-19 | Stop reason: HOSPADM

## 2024-03-15 RX ADMIN — ONDANSETRON 4 MG: 2 INJECTION INTRAMUSCULAR; INTRAVENOUS at 08:03

## 2024-03-15 RX ADMIN — LATANOPROST 1 DROP: 50 SOLUTION OPHTHALMIC at 08:03

## 2024-03-15 RX ADMIN — ACETAMINOPHEN 650 MG: 325 TABLET ORAL at 11:03

## 2024-03-15 RX ADMIN — SODIUM CHLORIDE 500 ML: 9 INJECTION, SOLUTION INTRAVENOUS at 10:03

## 2024-03-15 RX ADMIN — MIDODRINE HYDROCHLORIDE 5 MG: 2.5 TABLET ORAL at 04:03

## 2024-03-15 RX ADMIN — ACETAMINOPHEN 1000 MG: 500 TABLET ORAL at 10:03

## 2024-03-15 RX ADMIN — DRONEDARONE 400 MG: 400 TABLET, FILM COATED ORAL at 05:03

## 2024-03-15 RX ADMIN — DORZOLAMIDE HYDROCHLORIDE AND TIMOLOL MALEATE 1 DROP: 22.3; 6.8 SOLUTION/ DROPS OPHTHALMIC at 08:03

## 2024-03-15 RX ADMIN — DEXTROSE MONOHYDRATE 5 MG/HR: 50 INJECTION, SOLUTION INTRAVENOUS at 10:03

## 2024-03-15 RX ADMIN — CALCIUM ACETATE 667 MG: 667 CAPSULE ORAL at 05:03

## 2024-03-15 RX ADMIN — APIXABAN 2.5 MG: 2.5 TABLET, FILM COATED ORAL at 08:03

## 2024-03-15 NOTE — HPI
83-year-old female presented to ED for eval. Patient is ESRD on HD. Patient was in the middle of dialysis with about 45 minutes left when she became hypotensive with SBP in the 80s, responded to fluid bolus. She also developed substernal chest pressure at that time which did resolve. She also had persistent narrow complex tachycardia after CP. On arrival to ED patient's , she was in AF RVR, which converted to SR after brief period on cardizem gtt. Plan was possible discharge home however patient had positive troponin with increase in second troponin in ED. ED discussed case with nephrology, plan for admission for trending troponins. Admit to hospital medicine for further eval.

## 2024-03-15 NOTE — ED PROVIDER NOTES
Encounter Date: 3/15/2024       History     Chief Complaint   Patient presents with    Tachycardia     SVT at HD.       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  · Osteopenia of multiple sites 3/9/2018   Based upon bone density measurements. Patient also has chronic kidney disease.  · Stroke 11/2016  Who presents from dialysis secondary to hypotension and tachycardia.  Patient had a proximally 45 minutes left of dialysis.  She developed hypotension.  At that time she also developed midsternal nonradiating chest discomfort.  Shortly after she developed tachycardia.  Blood pressure in the 80s responded to an IV fluid bolus.  Chest pain resolved.  Has had persistent narrow complex tachycardia.  Chart review demonstrates patient with very similar presentation in January of this year.  Patient currently denies any pain or discomfort.  Has had some cough and nasal congestion over the past week which is improving which family members in the same household had also experienced.  She denies fever.  She denies headache any current chest pain, shortness of breath, abdominal pain, nausea, vomiting, diarrhea.  Denies any focal weakness, numbness tingling paresthesias or visual changes.  Denies any headaches.  Denies any other problems or  complaints.        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    Osteopenia of multiple sites 3/9/2018    Based upon bone density measurements. Patient also has chronic kidney disease.    Stroke 11/2016     Past Surgical History:   Procedure Laterality Date    ANGIOGRAM, CORONARY, WITH LEFT HEART CATHETERIZATION N/A 2/7/2022    Procedure: Angiogram, Coronary, with Left Heart Cath;  Surgeon: Gino Leal MD;  Location: [facility] CATH/EP LAB;  Service: Cardiology;  Laterality: N/A;    CARDIAC SURGERY      stents    EYE SURGERY      WRIST SURGERY       Family History   Problem Relation Age of Onset    Heart disease Mother     Cancer Father      Social History     Tobacco Use    Smoking status: Never    Smokeless tobacco: Never   Substance Use Topics    Alcohol use: No    Drug use: No     Review of Systems   Constitutional: Negative.  Negative for activity change, appetite change, chills, fatigue and fever.   HENT:  Positive for congestion and postnasal drip. Negative for ear pain, rhinorrhea, sore throat and trouble swallowing.    Eyes: Negative.  Negative for photophobia, pain, redness and visual disturbance.   Respiratory:  Positive for cough. Negative for chest tightness, shortness of breath and wheezing.    Cardiovascular:  Positive for chest pain (Had a brief episode of chest pressure while in dialysis  when she became hypotensive which has resolved with improvement of the hypotension). Negative for palpitations and leg swelling.   Gastrointestinal: Negative.  Negative for abdominal distention, abdominal pain, blood in stool, constipation, diarrhea, nausea and vomiting.   Endocrine: Negative.    Genitourinary: Negative.  Negative for decreased urine volume, difficulty urinating, dysuria, flank pain, frequency, pelvic pain and urgency.   Musculoskeletal: Negative.  Negative for arthralgias, back pain, gait problem, myalgias, neck pain and neck stiffness.   Skin: Negative.  Negative for rash.   Neurological: Negative.  Negative for dizziness, syncope, facial asymmetry, speech difficulty, weakness, light-headedness, numbness and headaches.   Hematological:  Does not bruise/bleed easily.   Psychiatric/Behavioral: Negative.  Negative for confusion.    All other systems reviewed and are negative.      Physical Exam     Initial Vitals [03/15/24 0942]   BP Pulse Resp Temp SpO2   (!) 102/54 (!) 180 16 99.5 °F (37.5 °C) 98 %      MAP       --         Physical Exam    Nursing note and vitals reviewed.  Constitutional: She is not diaphoretic. She is active and cooperative.  Non-toxic appearance. She does not have a sickly appearance. She does not appear ill. No distress.   HENT:   Head: Normocephalic and atraumatic.   Right Ear: Tympanic membrane normal.   Left Ear: Tympanic membrane normal.   Nose: Nose normal.   Mouth/Throat: Uvula is midline, oropharynx is clear and moist and mucous membranes are normal. No oral lesions. No uvula swelling. No oropharyngeal exudate, posterior oropharyngeal edema or posterior oropharyngeal erythema.   Eyes: Conjunctivae, EOM and lids are normal. Pupils are equal, round, and reactive to light. No scleral icterus.   Neck: Trachea normal and phonation normal. Neck supple. No thyroid mass and no thyromegaly present. No stridor present. No JVD present.   Normal range of motion.   Full passive  range of motion without pain.     Cardiovascular:  Normal heart sounds, intact distal pulses and normal pulses. An irregularly irregular rhythm present.   Tachycardia present.   Exam reveals no gallop, no distant heart sounds, no friction rub and no decreased pulses.       No murmur heard.  Pulmonary/Chest: Effort normal and breath sounds normal. No accessory muscle usage or stridor. No tachypnea. No respiratory distress. She has no wheezes. She has no rhonchi. She has no rales.   Abdominal: Abdomen is soft. Bowel sounds are normal. She exhibits no distension, no pulsatile midline mass and no mass. There is no abdominal tenderness. There is no rigidity and no guarding.   Musculoskeletal:         General: No tenderness or edema. Normal range of motion.      Right hand: Normal. Normal range of motion. Normal strength. Normal sensation. Normal capillary refill. Normal pulse.      Left hand: Normal. Normal range of motion. Normal strength. Normal sensation. Normal capillary refill. Normal pulse.      Cervical back: Normal, full passive range of motion without pain, normal range of motion and neck supple. No edema, erythema, rigidity or bony tenderness. No pain with movement, spinous process tenderness or muscular tenderness. Normal range of motion.      Thoracic back: Normal. No bony tenderness. Normal range of motion.      Lumbar back: Normal. No bony tenderness. Normal range of motion.      Right foot: Normal. Normal range of motion and normal capillary refill. No tenderness or bony tenderness. Normal pulse.      Left foot: Normal. Normal range of motion and normal capillary refill. No tenderness or bony tenderness. Normal pulse.      Comments: Pulses are 2+ throughout, cap refill is less than 2 sec throughout, extremities are nontender throughout with full range of motion. There is no spinal tenderness to palpation.     Neurological: She is alert and oriented to person, place, and time. She has normal strength. She  displays normal reflexes. No cranial nerve deficit or sensory deficit. Gait normal.   No focal deficits.   Skin: Skin is warm, dry and intact. Capillary refill takes less than 2 seconds. No ecchymosis, no petechiae and no rash noted. No erythema. No pallor.   Psychiatric: She has a normal mood and affect. Her speech is normal and behavior is normal. Judgment and thought content normal. Cognition and memory are normal.         ED Course   Procedures  Labs Reviewed   CBC W/ AUTO DIFFERENTIAL - Abnormal; Notable for the following components:       Result Value    RBC 3.30 (*)     Hemoglobin 9.9 (*)     Hematocrit 32.2 (*)     MCHC 30.7 (*)     Mono # 1.4 (*)     Eos # 0.6 (*)     Lymph % 12.2 (*)     Mono % 18.1 (*)     All other components within normal limits   COMPREHENSIVE METABOLIC PANEL - Abnormal; Notable for the following components:    CO2 33 (*)     Creatinine 4.7 (*)     Alkaline Phosphatase 48 (*)     eGFR 8.7 (*)     All other components within normal limits   TROPONIN I HIGH SENSITIVITY - Abnormal; Notable for the following components:    Troponin I High Sensitivity 19.2 (*)     All other components within normal limits   B-TYPE NATRIURETIC PEPTIDE - Abnormal; Notable for the following components:    BNP 1,039 (*)     All other components within normal limits   TROPONIN I HIGH SENSITIVITY - Abnormal; Notable for the following components:    Troponin I High Sensitivity 28.8 (*)     All other components within normal limits   GROUP A STREP, MOLECULAR   LIPASE   CK   MAGNESIUM   TSH   SARS-COV-2 RNA AMPLIFICATION, QUAL   INFLUENZA A AND B ANTIGEN    Narrative:     Specimen Source->Nasopharyngeal Swab   DRUG SCREEN PANEL, URINE EMERGENCY   URINALYSIS, REFLEX TO URINE CULTURE        ECG Results              EKG 12-lead (In process)        Collection Time Result Time QRS Duration [facility] QTC Calculation    03/15/24 15:43:08 03/16/24 05:58:22 72 521                     In process by Interface, Lab In [facility]  (03/16/24 05:58:33)                   Narrative:    Test Reason : R07.9,    Vent. Rate : 072 BPM     Atrial Rate : 072 BPM     P-R Int : 156 ms          QRS Dur : 072 ms      QT Int : 476 ms       P-R-T Axes : 085 028 -66 degrees     QTc Int : 521 ms    Sinus rhythm with Premature atrial complexes  Septal infarct ,age undetermined  T wave abnormality, consider inferior ischemia  T wave abnormality, consider anterolateral ischemia  Prolonged QT  Abnormal ECG  When compared with ECG of 15-MAR-2024 09:46,  Significant changes have occurred    Referred By: [email or web address]   SELF           Confirmed By:                       In process by Interface, Lab In [facility] (03/15/24 17:49:47)                   Narrative:    Test Reason : R07.9,    Vent. Rate : 072 BPM     Atrial Rate : 072 BPM     P-R Int : 156 ms          QRS Dur : 072 ms      QT Int : 476 ms       P-R-T Axes : 085 028 -66 degrees     QTc Int : 521 ms    Sinus rhythm with Premature atrial complexes  Septal infarct ,age undetermined  T wave abnormality, consider inferior ischemia  T wave abnormality, consider anterolateral ischemia  Prolonged QT  Abnormal ECG  When compared with ECG of 15-MAR-2024 09:46,  Significant changes have occurred    Referred By: [email or web address]   SELF           Confirmed By:                                      EKG 12-lead (In process)        Collection Time Result Time QRS Duration [facility] QTC Calculation    03/15/24 09:46:56 03/15/24 10:05:35 78 454                     In process by Interface, Lab In [facility] (03/15/24 10:05:39)                   Narrative:    Test Reason : R00.0,    Vent. Rate : 170 BPM     Atrial Rate : 174 BPM     P-R Int : 000 ms          QRS Dur : 078 ms      QT Int : 270 ms       P-R-T Axes : 000 -22 214 degrees     QTc Int : 454 ms    Atrial fibrillation with rapid ventricular response  Marked ST abnormality, possible inferior subendocardial injury  Abnormal ECG  When compared with ECG of 04-JAN-2024 11:07,  Questionable change  in The axis  Nonspecific T wave abnormality now evident in Inferior leads  Inverted T waves have replaced nonspecific T wave abnormality in Lateral  leads    Referred By: [email or web address]   SELF           Confirmed By:                                   Imaging Results              X-Ray Chest AP Portable (Final result)  Result time 03/15/24 11:00:02      Final result by Aleksey Atkins MD (03/15/24 11:00:02)                   Narrative:    HISTORY: Chest Pain    FINDINGS: Portable chest radiograph at 1018 hours compared to prior exams shows the cardiomediastinal silhouette and pulmonary vasculature are within normal limits. There are aortic vascular calcifications.    The lungs are normally expanded, with no consolidation, large pleural effusion, or evidence of pulmonary edema. No confluent infiltrates or pneumothorax. The bones are diffusely osteopenic.    IMPRESSION: No evidence of acute cardiopulmonary disease.    Electronically signed by:  Aleksey Atkins MD  03/15/2024 11:00 AM CDT Workstation: [id number]                                       Medical Decision Making  Patient has converted into a sinus rhythm.  Blood pressure has improved.  Chest pain has not reoccurred.  Discussed case with Dr. Maynard--plan to discharge if the patient remains asymptomatic and if 2nd troponin has not increased.  Reassessment--2nd troponin has increased greater than 5 points.  Will discuss with hospitalist for admission.  Heart rate remained stable    Amount and/or Complexity of Data Reviewed  Labs: ordered. Decision-making details documented in ED Course.  Radiology: ordered.    Risk  OTC drugs.  Decision regarding hospitalization.              Attending Attestation:         Attending Critical Care:   Critical Care Times:   Direct Patient Care (initial evaluation, reassessments, and time considering the case)................................................................6 minutes.   Additional History from reviewing old medical records  or taking additional history from the family, EMS, PCP, etc.......................6 minutes.   Ordering, Reviewing, and Interpreting Diagnostic Studies...............................................................................................................7 minutes.   Documentation..................................................................................................................................................................................6 minutes.   Consultation with other Physicians. .................................................................................................................................................4 minutes.   Consultation with the patient's family directly relating to the patient's condition, care, and DNR status (when patient unable)......5 minutes.   ==============================================================  Total Critical Care Time - exclusive of procedural time: 34 minutes.  ==============================================================          ED Course as of 03/16/24 0953   Fri Mar 15, 2024   1553 Troponin I High Sensitivity(!): 28.8 [AR]   1553 Influenza A, Molecular: Negative [AR]   1553 SARS-CoV-2 RNA, Amplification, Qual: Negative [AR]   1553 Group A Strep, Molecular: Negative [AR]   1553 TSH: 0.893 [AR]   1553 BNP(!): 1,039 [AR]   1553 Sodium: 138 [AR]   1553 Potassium: 3.5 [AR]   1553 Chloride: 96 [AR]   1553 BUN: 21 [AR]   1553 Creatinine(!): 4.7 [AR]   1553 Lipase: 58 [AR]   1553 CPK: 52 [AR]   1553 Magnesium : 2.0 [AR]   1553 WBC: 7.77 [AR]   1553 Hemoglobin(!): 9.9 [AR]   1553 Hematocrit(!): 32.2 [AR]      ED Course User Index  [AR] Henny Hummel MD                           Clinical Impression:  Final diagnoses:  [R00.0] Tachycardia  [R07.9] Chest pain          ED Disposition Condition    Observation                 Henny Hummel MD  03/16/24 0953

## 2024-03-15 NOTE — ASSESSMENT & PLAN NOTE
Elevated troponins x2 in ED with increase  Serial troponins overnight  EKG PRN  NTG PRN, hold for hypotension  Tele monitoring

## 2024-03-15 NOTE — ASSESSMENT & PLAN NOTE
Creatine stable for now. BMP reviewed- noted Estimated Creatinine Clearance: 6.9 mL/min (A) (based on SCr of 4.7 mg/dL (H)). according to latest data. Based on current GFR, CKD stage is end stage.  Monitor UOP and serial BMP and adjust therapy as needed. Renally dose meds. Avoid nephrotoxic medications and procedures.  Nephrology following

## 2024-03-15 NOTE — SUBJECTIVE & OBJECTIVE
Past Medical History:   Diagnosis Date    A-fib     Anxiety     Depression     Disorder of kidney and ureter     Encephalopathy acute 1/1/2018    End stage kidney disease 6/17/2017    Gout     Hyperlipidemia     Hypertension     Moderate episode of recurrent major depressive disorder 1/17/2018    Nephropathy hypertensive, 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    Osteopenia of multiple sites 3/9/2018    Based upon bone density measurements. Patient also has chronic kidney disease.    Stroke 11/2016       Past Surgical History:   Procedure Laterality Date    ANGIOGRAM, CORONARY, WITH LEFT HEART CATHETERIZATION N/A 2/7/2022    Procedure: Angiogram, Coronary, with Left Heart Cath;  Surgeon: Gino Leal MD;  Location: [facility] CATH/EP LAB;  Service: Cardiology;  Laterality: N/A;    CARDIAC SURGERY      stents    EYE SURGERY      WRIST SURGERY         Review of patient's allergies indicates:   Allergen Reactions    Cyclobenzaprine     Fish containing products Hives    Peanut Other (See Comments)    Tramadol Itching       No current facility-administered medications on file prior to encounter.     Current Outpatient Medications on File Prior to Encounter   Medication Sig    atorvastatin (LIPITOR) 40 MG tablet Take 40 mg by mouth once daily.    b complex vitamins tablet Take 1 tablet by mouth once daily.    calcium acetate,phosphat bind, (PHOSLO) 667 mg capsule Take 667 mg by mouth 3 (three) times daily with meals.    dorzolamide-timolol 2-0.5% (COSOPT) 22.3-6.8 mg/mL ophthalmic solution Place 1 drop into both eyes 2 (two) times daily.    dronedarone (MULTAQ) 400 mg Tab Take 1 tablet (400 mg total) by mouth 2 (two) times daily with meals.    ELIQUIS 2.5 mg Tab Take 2.5 mg by mouth 2 (two) times daily.    latanoprost 0.005 % ophthalmic solution Place 1 drop into both eyes every evening.     loperamide (IMODIUM A-D) 2 mg Tab Take 1 tablet (2 mg total) by mouth 4 (four) times daily as needed (diarrhea).    midodrine (PROAMATINE) 10 MG tablet Take 1 tablet (10 mg total) by mouth 3 (three) times daily with meals.    ondansetron (ZOFRAN-ODT) 4 MG TbDL Take 1 tablet (4 mg total) by mouth every 6 (six) hours as needed (nausea).    albuterol (VENTOLIN HFA) 90 mcg/actuation inhaler Inhale 2 puffs into the lungs every 6 (six) hours as needed for Wheezing. Rescue    [DISCONTINUED] levalbuterol (XOPENEX) 0.63 mg/3 mL nebulizer solution Take 1 ampule by nebulization every 4 to 6 hours as needed for Wheezing or Shortness of Breath.    [DISCONTINUED] ondansetron (ZOFRAN) 4 MG tablet Take 1 tablet (4 mg total) by mouth every 8 (eight) hours as needed for Nausea.     Family History       Problem Relation (Age of Onset)    Cancer Father    Heart disease Mother          Tobacco Use    Smoking status: Never    Smokeless tobacco: Never   Substance and Sexual Activity    Alcohol use: No    Drug use: No    Sexual activity: Not Currently     Review of Systems   Constitutional:  Negative for fever.   Respiratory:  Positive for chest tightness.    Cardiovascular:  Positive for chest pain. Negative for leg swelling.   Gastrointestinal:  Negative for abdominal pain, nausea and vomiting.   Neurological:  Negative for syncope.     Objective:     Vital Signs (Most Recent):  Temp: 99.5 °F (37.5 °C) (03/15/24 0942)  Pulse: 71 (03/15/24 1530)  Resp: (!) 25 (03/15/24 1530)  BP: (!) 107/51 (03/15/24 1530)  SpO2: 99 % (03/15/24 1530) Vital Signs (24h Range):  Temp:  [99.5 °F (37.5 °C)] 99.5 °F (37.5 °C)  Pulse:  [] 71  Resp:  [16-28] 25  SpO2:  [97 %-100 %] 99 %  BP: ()/(35-89) 107/51     Weight: 48.1 kg (106 lb)  Body mass index is 17.64 kg/m².     Physical Exam  Vitals reviewed.   Constitutional:       General: She is not in acute distress.  HENT:      Head: Atraumatic.      Mouth/Throat:      Mouth: Mucous membranes are  "moist.   Eyes:      Conjunctiva/sclera: Conjunctivae normal.   Cardiovascular:      Rate and Rhythm: Normal rate and regular rhythm.   Pulmonary:      Effort: Pulmonary effort is normal. No respiratory distress.   Abdominal:      Palpations: Abdomen is soft.      Tenderness: There is no abdominal tenderness.   Musculoskeletal:      Cervical back: Normal range of motion.      Right lower leg: No edema.      Left lower leg: No edema.   Skin:     General: Skin is warm and dry.   Neurological:      Mental Status: She is alert and oriented to person, place, and time.   Psychiatric:         Mood and Affect: Mood normal.                Significant Labs: All pertinent labs within the past 24 hours have been reviewed.  CBC:   Recent Labs   Lab 03/15/24  1011   WBC 7.77   HGB 9.9*   HCT 32.2*        CMP:   Recent Labs   Lab 03/15/24  1011      K 3.5   CL 96   CO2 33*      BUN 21   CREATININE 4.7*   CALCIUM 8.8   PROT 7.9   ALBUMIN 3.5   BILITOT 0.4   ALKPHOS 48*   AST 19   ALT 12   ANIONGAP 9     Cardiac Markers:   Recent Labs   Lab 03/15/24  1011   BNP 1,039*     Coagulation: No results for input(s): "PT", "INR", "APTT" in the last 48 hours.  Magnesium:   Recent Labs   Lab 03/15/24  1011   MG 2.0     Troponin:   Recent Labs   Lab 03/15/24  1011 03/15/24  1247   TROPONINIHS 19.2* 28.8*     TSH:   Recent Labs   Lab 03/15/24  1011   TSH 0.893     Urine Studies: No results for input(s): "COLORU", "APPEARANCEUA", "PHUR", "SPECGRAV", "PROTEINUA", "GLUCUA", "KETONESU", "BILIRUBINUA", "OCCULTUA", "NITRITE", "UROBILINOGEN", "LEUKOCYTESUR", "RBCUA", "WBCUA", "BACTERIA", "SQUAMEPITHEL", "HYALINECASTS" in the last 48 hours.    Invalid input(s): "WRIGHTSUR"    Significant Imaging: I have reviewed all pertinent imaging results/findings within the past 24 hours.  "

## 2024-03-15 NOTE — ASSESSMENT & PLAN NOTE
S/p HD  Resume home midodrine  Holding off on IVF given ESRD  Monitor BP  Will consider pressors if MAP<60

## 2024-03-15 NOTE — ASSESSMENT & PLAN NOTE
AF RVR on arrival, converted to SR s/p cardizem  Patient with Paroxysmal (<7 days) atrial fibrillation which is controlled currently with  multaq . Patient is currently in sinus rhythm.[id number] Score: 4. Anticoagulation indicated. Anticoagulation done with eliquis .

## 2024-03-16 PROBLEM — R50.9 FEVER: Status: ACTIVE | Noted: 2024-03-16

## 2024-03-16 LAB
ALBUMIN SERPL BCP-MCNC: 3.1 G/DL (ref 3.5–5.2)
ALP SERPL-CCNC: 48 U/L (ref 55–135)
ALT SERPL W/O P-5'-P-CCNC: 9 U/L (ref 10–44)
ANION GAP SERPL CALC-SCNC: 8 MMOL/L (ref 8–16)
AST SERPL-CCNC: 13 U/L (ref 10–40)
BASOPHILS # BLD AUTO: 0.04 K/UL (ref 0–0.2)
BASOPHILS NFR BLD: 0.4 % (ref 0–1.9)
BILIRUB SERPL-MCNC: 0.4 MG/DL (ref 0.1–1)
BUN SERPL-MCNC: 33 MG/DL (ref 8–23)
CALCIUM SERPL-MCNC: 8.9 MG/DL (ref 8.7–10.5)
CHLORIDE SERPL-SCNC: 99 MMOL/L (ref 95–110)
CO2 SERPL-SCNC: 32 MMOL/L (ref 23–29)
CREAT SERPL-MCNC: 6.4 MG/DL (ref 0.5–1.4)
DIFFERENTIAL METHOD BLD: ABNORMAL
EOSINOPHIL # BLD AUTO: 0.3 K/UL (ref 0–0.5)
EOSINOPHIL NFR BLD: 2.8 % (ref 0–8)
ERYTHROCYTE [DISTWIDTH] IN BLOOD BY AUTOMATED COUNT: 14.2 % (ref 11.5–14.5)
EST. GFR  (NO RACE VARIABLE): 6 ML/MIN/1.73 M^2
GLUCOSE SERPL-MCNC: 99 MG/DL (ref 70–110)
HCT VFR BLD AUTO: 31.5 % (ref 37–48.5)
HGB BLD-MCNC: 9.5 G/DL (ref 12–16)
IMM GRANULOCYTES # BLD AUTO: 0.02 K/UL (ref 0–0.04)
IMM GRANULOCYTES NFR BLD AUTO: 0.2 % (ref 0–0.5)
LYMPHOCYTES # BLD AUTO: 1 K/UL (ref 1–4.8)
LYMPHOCYTES NFR BLD: 11 % (ref 18–48)
MAGNESIUM SERPL-MCNC: 2 MG/DL (ref 1.6–2.6)
MCH RBC QN AUTO: 29.1 PG (ref 27–31)
MCHC RBC AUTO-ENTMCNC: 30.2 G/DL (ref 32–36)
MCV RBC AUTO: 97 FL (ref 82–98)
MONOCYTES # BLD AUTO: 1.4 K/UL (ref 0.3–1)
MONOCYTES NFR BLD: 14.6 % (ref 4–15)
NEUTROPHILS # BLD AUTO: 6.6 K/UL (ref 1.8–7.7)
NEUTROPHILS NFR BLD: 71 % (ref 38–73)
NRBC BLD-RTO: 0 /100 WBC
PHOSPHATE SERPL-MCNC: 3.1 MG/DL (ref 2.7–4.5)
PLATELET # BLD AUTO: 368 K/UL (ref 150–450)
PMV BLD AUTO: 9.9 FL (ref 9.2–12.9)
POTASSIUM SERPL-SCNC: 4.4 MMOL/L (ref 3.5–5.1)
PROT SERPL-MCNC: 7.2 G/DL (ref 6–8.4)
RBC # BLD AUTO: 3.26 M/UL (ref 4–5.4)
SODIUM SERPL-SCNC: 139 MMOL/L (ref 136–145)
TROPONIN I SERPL HS-MCNC: 41.1 PG/ML (ref 0–14.9)
TROPONIN I SERPL HS-MCNC: 42 PG/ML (ref 0–14.9)
TROPONIN I SERPL HS-MCNC: 54.4 PG/ML (ref 0–14.9)
TROPONIN I SERPL HS-MCNC: 57 PG/ML (ref 0–14.9)
WBC # BLD AUTO: 9.27 K/UL (ref 3.9–12.7)

## 2024-03-16 PROCEDURE — 99900035 HC TECH TIME PER 15 MIN (STAT)

## 2024-03-16 PROCEDURE — 27000221 HC OXYGEN, UP TO 24 HOURS

## 2024-03-16 PROCEDURE — 83735 ASSAY OF MAGNESIUM: CPT

## 2024-03-16 PROCEDURE — 25000003 PHARM REV CODE 250: Performed by: INTERNAL MEDICINE

## 2024-03-16 PROCEDURE — 84484 ASSAY OF TROPONIN QUANT: CPT | Mod: 91

## 2024-03-16 PROCEDURE — 85025 COMPLETE CBC W/AUTO DIFF WBC: CPT

## 2024-03-16 PROCEDURE — 99233 SBSQ HOSP IP/OBS HIGH 50: CPT | Mod: ,,, | Performed by: INTERNAL MEDICINE

## 2024-03-16 PROCEDURE — 94761 N-INVAS EAR/PLS OXIMETRY MLT: CPT

## 2024-03-16 PROCEDURE — 63600175 PHARM REV CODE 636 W HCPCS

## 2024-03-16 PROCEDURE — 25000003 PHARM REV CODE 250

## 2024-03-16 PROCEDURE — 96376 TX/PRO/DX INJ SAME DRUG ADON: CPT

## 2024-03-16 PROCEDURE — 84100 ASSAY OF PHOSPHORUS: CPT

## 2024-03-16 PROCEDURE — 80053 COMPREHEN METABOLIC PANEL: CPT

## 2024-03-16 PROCEDURE — 84484 ASSAY OF TROPONIN QUANT: CPT

## 2024-03-16 PROCEDURE — G0378 HOSPITAL OBSERVATION PER HR: HCPCS

## 2024-03-16 PROCEDURE — 36415 COLL VENOUS BLD VENIPUNCTURE: CPT

## 2024-03-16 RX ORDER — POLYETHYLENE GLYCOL 3350 17 G/17G
17 POWDER, FOR SOLUTION ORAL DAILY
Status: DISCONTINUED | OUTPATIENT
Start: 2024-03-16 | End: 2024-03-19 | Stop reason: HOSPADM

## 2024-03-16 RX ORDER — IPRATROPIUM BROMIDE AND ALBUTEROL SULFATE 2.5; .5 MG/3ML; MG/3ML
3 SOLUTION RESPIRATORY (INHALATION) EVERY 6 HOURS PRN
Status: DISCONTINUED | OUTPATIENT
Start: 2024-03-16 | End: 2024-03-19 | Stop reason: HOSPADM

## 2024-03-16 RX ADMIN — LATANOPROST 1 DROP: 50 SOLUTION OPHTHALMIC at 08:03

## 2024-03-16 RX ADMIN — CALCIUM ACETATE 667 MG: 667 CAPSULE ORAL at 07:03

## 2024-03-16 RX ADMIN — DORZOLAMIDE HYDROCHLORIDE AND TIMOLOL MALEATE 1 DROP: 22.3; 6.8 SOLUTION/ DROPS OPHTHALMIC at 08:03

## 2024-03-16 RX ADMIN — MIDODRINE HYDROCHLORIDE 5 MG: 2.5 TABLET ORAL at 08:03

## 2024-03-16 RX ADMIN — DORZOLAMIDE HYDROCHLORIDE AND TIMOLOL MALEATE 1 DROP: 22.3; 6.8 SOLUTION/ DROPS OPHTHALMIC at 07:03

## 2024-03-16 RX ADMIN — SENNOSIDES AND DOCUSATE SODIUM 1 TABLET: 8.6; 5 TABLET ORAL at 02:03

## 2024-03-16 RX ADMIN — ACETAMINOPHEN 650 MG: 325 TABLET ORAL at 11:03

## 2024-03-16 RX ADMIN — DRONEDARONE 400 MG: 400 TABLET, FILM COATED ORAL at 07:03

## 2024-03-16 RX ADMIN — MIDODRINE HYDROCHLORIDE 5 MG: 2.5 TABLET ORAL at 07:03

## 2024-03-16 RX ADMIN — CALCIUM ACETATE 667 MG: 667 CAPSULE ORAL at 05:03

## 2024-03-16 RX ADMIN — CALCIUM ACETATE 667 MG: 667 CAPSULE ORAL at 01:03

## 2024-03-16 RX ADMIN — ATORVASTATIN CALCIUM 40 MG: 40 TABLET, FILM COATED ORAL at 10:03

## 2024-03-16 RX ADMIN — Medication 6 MG: at 02:03

## 2024-03-16 RX ADMIN — APIXABAN 2.5 MG: 2.5 TABLET, FILM COATED ORAL at 07:03

## 2024-03-16 RX ADMIN — POLYETHYLENE GLYCOL 3350 17 G: 17 POWDER, FOR SOLUTION ORAL at 05:03

## 2024-03-16 RX ADMIN — Medication 1 TABLET: at 08:03

## 2024-03-16 RX ADMIN — ONDANSETRON 4 MG: 2 INJECTION INTRAMUSCULAR; INTRAVENOUS at 07:03

## 2024-03-16 RX ADMIN — MIDODRINE HYDROCHLORIDE 5 MG: 2.5 TABLET ORAL at 02:03

## 2024-03-16 RX ADMIN — DRONEDARONE 400 MG: 400 TABLET, FILM COATED ORAL at 05:03

## 2024-03-16 RX ADMIN — ACETAMINOPHEN 650 MG: 325 TABLET ORAL at 01:03

## 2024-03-16 RX ADMIN — APIXABAN 2.5 MG: 2.5 TABLET, FILM COATED ORAL at 08:03

## 2024-03-16 NOTE — ASSESSMENT & PLAN NOTE
Elevated troponins x2 in ED with increase  Serial troponins -stable  Likely elevated secondary to ESRD.  Denies any further chest pain.

## 2024-03-16 NOTE — ASSESSMENT & PLAN NOTE
Creatine stable for now. BMP reviewed- noted Estimated Creatinine Clearance: 5.7 mL/min (A) (based on SCr of 6.4 mg/dL (H)). according to latest data. Based on current GFR, CKD stage is end stage.  Monitor UOP and serial BMP and adjust therapy as needed. Renally dose meds. Avoid nephrotoxic medications and procedures.  Nephrology following.  Monday Wednesday Friday dialysis.  States she did 4 hours of dialysis yesterday.   [employer] Customer Service  [facility]  [address] , [address]  [place] [address]  Fax: [phone]  Phone: [phone]      2017      Lexii Oneill  [facility] [address]        Dear Lexii,    Thank you for your interest in becoming a [employer] user!    Your access code is: [email or web address]  Expires: 2017  7:45 AM     Please access the [employer] website at [email or web address].  Below the ID and password fields, select the  Sign Up Now  as New User.  You will be prompted to enter the access code listed above as well as additional personal information.  Please follow the directions carefully when creating your username and password.    If you allow your access code to , or if you have any questions please call a [employer] Representative at [phone] during normal clinic hours.     Sincerely,      [employer] Customer Service  [facility]

## 2024-03-16 NOTE — ASSESSMENT & PLAN NOTE
Unknown etiology at this time.  Chest x-ray was negative for any acute process.  UA pending, patient will likely not be able to give this.  We will monitor this evening.

## 2024-03-16 NOTE — PLAN OF CARE
Problem: Infection  Goal: Absence of Infection Signs and Symptoms  Outcome: Ongoing, Progressing     Problem: Nausea and Vomiting  Goal: Fluid and Electrolyte Balance  Outcome: Ongoing, Progressing     Problem: Fall Injury Risk  Goal: Absence of Fall and Fall-Related Injury  Outcome: Ongoing, Progressing

## 2024-03-16 NOTE — CONSULTS
INPATIENT NEPHROLOGY CONSULT   [facility]    Patient Name: Tyra Isaac  Date: 03/16/2024    Reason for consultation: ESRD    Chief Complaint:   Chief Complaint   Patient presents with    Tachycardia     SVT at HD.         History of Present Illness:  83-year-old female presented to ED for eval. Patient is ESRD on HD. Patient was in the middle of dialysis with about 45 minutes left when she became hypotensive with SBP in the 80s, responded to fluid bolus. She also developed substernal chest pressure at that time which did resolve. She also had persistent narrow complex tachycardia after CP. On arrival to ED patient's , she was in AF RVR, which converted to SR after brief period on cardizem gtt. Plan was possible discharge home however patient had positive troponin with increase in second troponin in ED. Consulted for dialysis.     Interval History:  3/16- trop stabilized on last 3 checks- off cardizem- c/o constipation    Plan of Care:    Assessment:  ESRD on HD MWF  AF with RVR  Elevated troponin  SHPT  Anemia of CKD    Plan:    - no acute RRT needs- continue HD MWF  - back in NSR with cardizem gtt- now off gtt and only on PO meds  - trop peaked and now stabilized  - resume midodrine, eliquis  - resume binder with meals  - will give BRAYAN with HD    Thank you for allowing us to participate in this patient's care. We will continue to follow.    Vital Signs:  Temp Readings from Last 3 Encounters:   03/16/24 98.2 °F (36.8 °C) (Oral)   01/08/24 98.8 °F (37.1 °C)   12/21/23 98.6 °F (37 °C) (Oral)       Pulse Readings from Last 3 Encounters:   03/16/24 87   01/11/24 109   01/08/24 85       BP Readings from Last 3 Encounters:   03/16/24 (!) 126/59   01/11/24 (!) 87/53   01/08/24 (!) 126/50       Weight:  Wt Readings from Last 3 Encounters:   03/15/24 54.5 kg (120 lb 2.4 oz)   01/11/24 44 kg (97 lb)   01/04/24 49.9 kg (110 lb)       Past Medical & Surgical History:  Past Medical History:    Diagnosis Date    A-fib     Anxiety     Depression     Disorder of kidney and ureter     Encephalopathy acute 1/1/2018    End stage kidney disease 6/17/2017    Gout     Hyperlipidemia     Hypertension     Moderate episode of recurrent major depressive disorder 1/17/2018    Nephropathy hypertensive, 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    Osteopenia of multiple sites 3/9/2018    Based upon bone density measurements. Patient also has chronic kidney disease.    Stroke 11/2016       Past Surgical History:   Procedure Laterality Date    ANGIOGRAM, CORONARY, WITH LEFT HEART CATHETERIZATION N/A 2/7/2022    Procedure: Angiogram, Coronary, with Left Heart Cath;  Surgeon: Gino Leal MD;  Location: [facility] CATH/EP LAB;  Service: Cardiology;  Laterality: N/A;    CARDIAC SURGERY      stents    EYE SURGERY      WRIST SURGERY         Past Social History:  Social History     Socioeconomic History    Marital status:      Spouse name: Aria Isaac    Number of children: 3   Occupational History    Occupation: Not working   Tobacco Use    Smoking status: Never    Smokeless tobacco: Never   Substance and Sexual Activity    Alcohol use: No    Drug use: No    Sexual activity: Not Currently     Social Determinants of Health     Financial Resource Strain: Medium Risk (6/15/2022)    Overall Financial Resource Strain (CARDIA)     Difficulty of Paying Living Expenses: Somewhat hard   Food Insecurity: No Food Insecurity (6/15/2022)    Hunger Vital Sign     Worried About Running Out of Food in the Last Year: Never true     Ran Out of Food in the Last Year: Never true   Transportation Needs: Unmet Transportation Needs (3/29/2023)    PRAPARE - Transportation     Lack of Transportation (Medical): Yes     Lack of Transportation (Non-Medical): No   Physical Activity: Inactive (6/15/2022)     Exercise Vital Sign     Days of Exercise per Week: 0 days     Minutes of Exercise per Session: 0 min   Stress: Stress Concern Present (6/15/2022)    [employer] - Occupational Stress Questionnaire     Feeling of Stress : To some extent   Social Connections: Moderately Isolated (6/15/2022)    Social Connection and Isolation Panel [NHANES]     Frequency of Communication with Friends and Family: Three times a week     Frequency of Social Gatherings with Friends and Family: Three times a week     Attends Ten[employer] Services: 1 to 4 times per year     Active Member of Clubs or Organizations: No     Attends Club or Organization Meetings: Never     Marital Status:    Housing Stability: Low Risk  (6/15/2022)    Housing Stability Vital Sign     Unable to Pay for Housing in the Last Year: No     Number of Places Lived in the Last Year: 1     Unstable Housing in the Last Year: No       Medications:  Scheduled Meds:   apixaban  2.5 mg Oral BID    atorvastatin  40 mg Oral Daily    calcium acetate(phosphat bind)  667 mg Oral TID WM    dorzolamide-timolol 2-0.5%  1 drop Both Eyes BID    dronedarone  400 mg Oral BID WM    latanoprost  1 drop Both Eyes QHS    midodrine  5 mg Oral TID    midodrine  5 mg Oral Once    vit b cmplx 3-fa-vit c-biotin 1- mg-mg-mcg  1 tablet Oral Daily     Continuous Infusions:   dilTIAZem Stopped (03/15/24 1105)     PRN Meds:.acetaminophen, albuterol-ipratropium, aluminum-magnesium hydroxide-simethicone, melatonin, naloxone, nitroGLYCERIN, ondansetron, senna-docusate 8.6-50 mg, sodium chloride 0.9%  No current facility-administered medications on file prior to encounter.     Current Outpatient Medications on File Prior to Encounter   Medication Sig Dispense Refill    atorvastatin (LIPITOR) 40 MG tablet Take 40 mg by mouth once daily.      b complex vitamins tablet Take 1 tablet by mouth once daily.      calcium acetate,phosphat bind, (PHOSLO) 667 mg capsule Take 667  mg by mouth 3 (three) times daily with meals.      dorzolamide-timolol 2-0.5% (COSOPT) 22.3-6.8 mg/mL ophthalmic solution Place 1 drop into both eyes 2 (two) times daily.      dronedarone (MULTAQ) 400 mg Tab Take 1 tablet (400 mg total) by mouth 2 (two) times daily with meals. 180 tablet 0    ELIQUIS 2.5 mg Tab Take 2.5 mg by mouth 2 (two) times daily.      latanoprost 0.005 % ophthalmic solution Place 1 drop into both eyes every evening.      loperamide (IMODIUM A-D) 2 mg Tab Take 1 tablet (2 mg total) by mouth 4 (four) times daily as needed (diarrhea). 3 tablet 0    midodrine (PROAMATINE) 10 MG tablet Take 1 tablet (10 mg total) by mouth 3 (three) times daily with meals. 270 tablet 0    ondansetron (ZOFRAN-ODT) 4 MG TbDL Take 1 tablet (4 mg total) by mouth every 6 (six) hours as needed (nausea). 30 tablet 5    albuterol (VENTOLIN HFA) 90 mcg/actuation inhaler Inhale 2 puffs into the lungs every 6 (six) hours as needed for Wheezing. Rescue 8 g 2       Allergies:  Cyclobenzaprine, Fish containing products, Peanut, and Tramadol    Past Family History:  Reviewed; refer to Hospitalist Admission Note    Review of Systems:  Review of Systems - All 14 systems reviewed and negative, except as noted in HPI    Physical Exam:  General Appearance:    NAD, AAO x 3, cooperative, appears stated age   Head:    Normocephalic, atraumatic   Eyes:    PER, EOMI, and conjunctiva/sclera clear bilaterally       Mouth:   Moist mucus membranes, no thrush or oral lesions,       normal dentition   Back:     No CVA tenderness   Lungs:     Clear to auscultation bilaterally, no wheezes, crackles,           rales or rhonchi, symmetric air movement, respirations unlabored   Chest wall:    No tenderness or deformity   Heart:    Regular rate and rhythm, S1 and S2 normal, no murmur, rub   or gallop   Abdomen:     Soft, non-tender, non-distended, bowel sounds active all four   quadrants, no RT or guarding, no masses, no organomegaly   Extremities:    Warm and well perfused, distal pulses are intact, no             cyanosis or peripheral edema   MSK:   No joint or muscle swelling, tenderness or deformity   Skin:   Skin color, texture, turgor normal, no rashes or lesions   Neurologic/Psychiatric:   CNII-XII intact, normal strength and sensation       throughout, no asterixis; normal affect, memory, judgement     and insight      Results:  Lab Results   Component Value Date     03/16/2024    K 4.4 03/16/2024    CL 99 03/16/2024    CO2 32 (H) 03/16/2024    BUN 33 (H) 03/16/2024    CREATININE 6.4 (H) 03/16/2024    CALCIUM 8.9 03/16/2024    ANIONGAP 8 03/16/2024    ESTGFRAFRICA 4.7 (A) 02/08/2022    EGFRNONAA 4.0 (A) 02/08/2022       Lab Results   Component Value Date    CALCIUM 8.9 03/16/2024    PHOS 3.1 03/16/2024       Recent Labs   Lab 03/16/24  0320   WBC 9.27   RBC 3.26*   HGB 9.5*   HCT 31.5*      MCV 97   MCH 29.1   MCHC 30.2*       I have personally reviewed pertinent radiological imaging and reports.    I have spent > 70 minutes providing care for this patient for the above diagnoses. These services have included chart/data/imaging review, evaluation, exam, formulation of plan, , note preparation, and discussions with staff involved in this patient's care.    Rose Maynard MD MPH  [facility] [address] [address]  [phone] (p)  [phone] (f)

## 2024-03-16 NOTE — PLAN OF CARE
03/16/24 1052   WARD Message   Medicare Outpatient and Observation Notification regarding financial responsibility Given to patient/caregiver;Explained to patient/caregiver;Signed/date by patient/caregiver   Date WARD was signed 03/16/24   Time WARD was signed 1010

## 2024-03-16 NOTE — SUBJECTIVE & OBJECTIVE
Interval History:  Patient had 1 time fever overnight.  States she has not feel well today.  Unable to articulate what that means.  Denies any further chest pain.    Review of Systems  Objective:     Vital Signs (Most Recent):  Temp: 98.2 °F (36.8 °C) (03/16/24 0701)  Pulse: 87 (03/16/24 0702)  Resp: 18 (03/16/24 0702)  BP: (!) 126/59 (03/16/24 0701)  SpO2: 97 % (03/16/24 0702) Vital Signs (24h Range):  Temp:  [97.7 °F (36.5 °C)-101.3 °F (38.5 °C)] 98.2 °F (36.8 °C)  Pulse:  [] 87  Resp:  [16-28] 18  SpO2:  [93 %-100 %] 97 %  BP: ()/(35-89) 126/59     Weight: 54.5 kg (120 lb 2.4 oz)  Body mass index is 19.99 kg/m².    Intake/Output Summary (Last 24 hours) at 3/16/2024 0900  Last data filed at 3/15/2024 2329  Gross per 24 hour   Intake 553.83 ml   Output --   Net 553.83 ml         Physical Exam  Vitals reviewed.   Constitutional:       General: She is not in acute distress.     Appearance: Normal appearance.   HENT:      Head: Normocephalic and atraumatic.      Right Ear: External ear normal.      Left Ear: External ear normal.      Nose: Nose normal.      Mouth/Throat:      Mouth: Mucous membranes are moist.      Pharynx: Oropharynx is clear.   Eyes:      Extraocular Movements: Extraocular movements intact.      Conjunctiva/sclera: Conjunctivae normal.   Cardiovascular:      Rate and Rhythm: Normal rate and regular rhythm.      Pulses: Normal pulses.      Heart sounds: Normal heart sounds. No murmur heard.     No gallop.   Pulmonary:      Effort: Pulmonary effort is normal. No respiratory distress.      Breath sounds: Normal breath sounds. No wheezing or rales.   Abdominal:      General: Abdomen is flat. There is no distension.      Palpations: Abdomen is soft.      Tenderness: There is no abdominal tenderness. There is no guarding.   Musculoskeletal:         General: No swelling. Normal range of motion.      Cervical back: Normal range of motion and neck supple.      Comments: Right upper extremity  fistula noted   Skin:     General: Skin is warm and dry.   Neurological:      General: No focal deficit present.      Mental Status: She is alert and oriented to person, place, and time.             Significant Labs: All pertinent labs within the past 24 hours have been reviewed.    Significant Imaging: I have reviewed all pertinent imaging results/findings within the past 24 hours.

## 2024-03-16 NOTE — PROGRESS NOTES
[facility]  Progress Note    Patient Name: Tyra Isaac  MRN: [id number]  Patient Class: OP- Observation   Admission Date: 3/15/2024  Length of Stay: 0 days  Attending Physician: Denzel Ventura Jr., MD  Primary Care Provider: Kalpesh Goel MD        Subjective:     Principal Problem:Chest pain        HPI:  83-year-old female presented to ED for eval. Patient is ESRD on HD. Patient was in the middle of dialysis with about 45 minutes left when she became hypotensive with SBP in the 80s, responded to fluid bolus. She also developed substernal chest pressure at that time which did resolve. She also had persistent narrow complex tachycardia after CP. On arrival to ED patient's , she was in AF RVR, which converted to SR after brief period on cardizem gtt. Plan was possible discharge home however patient had positive troponin with increase in second troponin in ED. ED discussed case with nephrology, plan for admission for trending troponins. Admit to hospital medicine for further eval.     Overview/Hospital Course:  No notes on file    Interval History:  Patient had 1 time fever overnight.  States she has not feel well today.  Unable to articulate what that means.  Denies any further chest pain.    Review of Systems  Objective:     Vital Signs (Most Recent):  Temp: 98.2 °F (36.8 °C) (03/16/24 0701)  Pulse: 87 (03/16/24 0702)  Resp: 18 (03/16/24 0702)  BP: (!) 126/59 (03/16/24 0701)  SpO2: 97 % (03/16/24 0702) Vital Signs (24h Range):  Temp:  [97.7 °F (36.5 °C)-101.3 °F (38.5 °C)] 98.2 °F (36.8 °C)  Pulse:  [] 87  Resp:  [16-28] 18  SpO2:  [93 %-100 %] 97 %  BP: ()/(35-89) 126/59     Weight: 54.5 kg (120 lb 2.4 oz)  Body mass index is 19.99 kg/m².    Intake/Output Summary (Last 24 hours) at 3/16/2024 0900  Last data filed at 3/15/2024 2329  Gross per 24 hour   Intake 553.83 ml   Output --   Net 553.83 ml         Physical Exam  Vitals reviewed.   Constitutional:        General: She is not in acute distress.     Appearance: Normal appearance.   HENT:      Head: Normocephalic and atraumatic.      Right Ear: External ear normal.      Left Ear: External ear normal.      Nose: Nose normal.      Mouth/Throat:      Mouth: Mucous membranes are moist.      Pharynx: Oropharynx is clear.   Eyes:      Extraocular Movements: Extraocular movements intact.      Conjunctiva/sclera: Conjunctivae normal.   Cardiovascular:      Rate and Rhythm: Normal rate and regular rhythm.      Pulses: Normal pulses.      Heart sounds: Normal heart sounds. No murmur heard.     No gallop.   Pulmonary:      Effort: Pulmonary effort is normal. No respiratory distress.      Breath sounds: Normal breath sounds. No wheezing or rales.   Abdominal:      General: Abdomen is flat. There is no distension.      Palpations: Abdomen is soft.      Tenderness: There is no abdominal tenderness. There is no guarding.   Musculoskeletal:         General: No swelling. Normal range of motion.      Cervical back: Normal range of motion and neck supple.      Comments: Right upper extremity fistula noted   Skin:     General: Skin is warm and dry.   Neurological:      General: No focal deficit present.      Mental Status: She is alert and oriented to person, place, and time.             Significant Labs: All pertinent labs within the past 24 hours have been reviewed.    Significant Imaging: I have reviewed all pertinent imaging results/findings within the past 24 hours.    Assessment/Plan:      * Chest pain  Elevated troponins x2 in ED with increase  Serial troponins -stable  Likely elevated secondary to ESRD.  Denies any further chest pain.      Fever  Unknown etiology at this time.  Chest x-ray was negative for any acute process.  UA pending, patient will likely not be able to give this.  We will monitor this evening.      ESRD (end stage renal disease)  Creatine stable for now. BMP reviewed- noted Estimated Creatinine Clearance:  5.7 mL/min (A) (based on SCr of 6.4 mg/dL (H)). according to latest data. Based on current GFR, CKD stage is end stage.  Monitor UOP and serial BMP and adjust therapy as needed. Renally dose meds. Avoid nephrotoxic medications and procedures.  Nephrology following.  Monday Wednesday Friday dialysis.  States she did 4 hours of dialysis yesterday.    Hypotension  Resolved  Resume home midodrine  Monitor BP    Atrial fibrillation with RVR with history of known paroxysmal atrial fibrillation  AF RVR on arrival, converted to SR s/p cardizem  Patient with Paroxysmal (<7 days) atrial fibrillation which is controlled currently with  multaq . Patient is currently in sinus rhythm.[id number] Score: 4. Anticoagulation indicated. Anticoagulation done with eliquis .      VTE Risk Mitigation (From admission, onward)           Ordered     apixaban tablet 2.5 mg  2 times daily         03/15/24 1620     Reason for No Pharmacological VTE Prophylaxis  Once        Question:  Reasons:  Answer:  Already adequately anticoagulated on oral Anticoagulants    03/15/24 1617     IP VTE HIGH RISK PATIENT  Once         03/15/24 1617     Place sequential compression device  Until discontinued         03/15/24 1617                    Discharge Planning   ILAN:      Code Status: Full Code   Is the patient medically ready for discharge?:     Reason for patient still in hospital (select all that apply): Treatment and Pending disposition                     Denzel Ventura Jr, MD  Department of Hospital Medicine   [facility]

## 2024-03-16 NOTE — NURSING
Nurses Note -- 4 Eyes      3/16/2024   12:04 AM      Skin assessed during: Admit      [x] No Altered Skin Integrity Present    []Prevention Measures Documented      [] Yes- Altered Skin Integrity Present or Discovered   [] LDA Added if Not in Epic (Describe Wound)   [] New Altered Skin Integrity was Present on Admit and Documented in LDA   [] Wound Image Taken    Wound Care Consulted? No    Attending Nurse:  Marleni Henao RN/Staff Member:[id number]

## 2024-03-16 NOTE — PLAN OF CARE
[facility]  Initial Discharge Assessment    CM engaged with patient at bedside to complete initial d/c assessment. Patient reports that she does not have a living will or a POA. Patient confirmed all demographic information as correct and reports that she would like to transition back to her home with family upon discharge. Patient does attend dialysis with [facility] on Monday, Wednesday and Friday. Patient was unsure as to whether or not she took coumadin medication. Patient does not use any other DME other than what is listed below. Patient denies the use of any outpatient services at this time and reports her primary care givers to be her son Raffi and his wife Laura with whom she is currently residing. Patient is transported to all doctors appointments via Raffi. Patient reports no additional needs at this time.       Primary Care Provider: Kalpesh Goel MD    Admission Diagnosis: Chest pain [R07.9]    Admission Date: 3/15/2024  Expected Discharge Date:     Transition of Care Barriers: None    Payor: [employer] MANAGED MEDICARE / Plan: [employer] MEDICARE HMO / Product Type: Capitation /     Extended Emergency Contact Information  Primary Emergency Contact: Raffi Isaac  Address: [address] [place] [address] [id number] [place] [address]  Home Phone: [phone]  Mobile Phone: [phone]  Relation: Son   needed? No  Secondary Emergency Contact: Marcelo Isaac  Mobile Phone: [phone]  Relation: Son    Discharge Plan A: Home with family  Discharge Plan B: Home with family      [facility] [place], [place] - [address]  [address] [address]  Phone: [phone] Fax: [phone]    [facility] - [place] - [address]  [address] [facility] [address]  Phone: [phone] Fax: [phone]      Initial Assessment (most recent)       Adult Discharge Assessment - 03/16/24 1045          Discharge Assessment     Assessment Type Discharge Planning Assessment     Confirmed/corrected address, phone number and insurance Yes     Confirmed Demographics Correct on Facesheet     Source of Information patient     When was your last doctors appointment? --   2 months ago.    Does patient/caregiver understand observation status Yes     Communicated ILAN with patient/caregiver Date not available/Unable to determine     Reason For Admission Chest Pain     People in Home child(aron), adult     Facility Arrived From: Home     Do you expect to return to your current living situation? Yes     Do you have help at home or someone to help you manage your care at home? Yes     Who are your caregiver(s) and their phone number(s)? Darcy [phone]     Prior to hospitilization cognitive status: Alert/Oriented     Current cognitive status: Alert/Oriented     Walking or Climbing Stairs Difficulty yes     Walking or Climbing Stairs ambulation difficulty, requires equipment     Mobility Management Rolling walker     Dressing/Bathing Difficulty yes     Dressing/Bathing bathing difficulty, requires equipment     Home Accessibility wheelchair accessible     Home Layout Able to live on 1st floor     Equipment Currently Used at Home walker, rolling     Readmission within 30 days? No     Patient currently being followed by outpatient case management? No     Do you currently have service(s) that help you manage your care at home? No     Do you take prescription medications? Yes     Do you have prescription coverage? Yes     Coverage [employer] Man[employer] Medicare     Do you have any problems affording any of your prescribed medications? No     Is the patient taking medications as prescribed? yes     Who is going to help you get home at discharge? Raffi Isaac, Son     How do you get to doctors appointments? family or friend will provide     Are you on dialysis? Yes     Dialysis Name and Scheduled days [facility] Monday, Wednesday and Friday     Do you  take coumadin? No     Discharge Plan A Home with family     Discharge Plan B Home with family     DME Needed Upon Discharge  shower chair     Discharge Plan discussed with: Patient     Transition of Care Barriers None

## 2024-03-17 LAB
ALBUMIN SERPL BCP-MCNC: 3.1 G/DL (ref 3.5–5.2)
ALP SERPL-CCNC: 44 U/L (ref 55–135)
ALT SERPL W/O P-5'-P-CCNC: 8 U/L (ref 10–44)
ANION GAP SERPL CALC-SCNC: 10 MMOL/L (ref 8–16)
AST SERPL-CCNC: 12 U/L (ref 10–40)
BASOPHILS # BLD AUTO: 0.03 K/UL (ref 0–0.2)
BASOPHILS NFR BLD: 0.3 % (ref 0–1.9)
BILIRUB SERPL-MCNC: 0.4 MG/DL (ref 0.1–1)
BUN SERPL-MCNC: 54 MG/DL (ref 8–23)
CALCIUM SERPL-MCNC: 9.1 MG/DL (ref 8.7–10.5)
CHLORIDE SERPL-SCNC: 96 MMOL/L (ref 95–110)
CO2 SERPL-SCNC: 31 MMOL/L (ref 23–29)
CREAT SERPL-MCNC: 8.2 MG/DL (ref 0.5–1.4)
D DIMER PPP IA.FEU-MCNC: 0.82 MG/L FEU (ref 0–0.49)
DIFFERENTIAL METHOD BLD: ABNORMAL
EOSINOPHIL # BLD AUTO: 0.1 K/UL (ref 0–0.5)
EOSINOPHIL NFR BLD: 0.9 % (ref 0–8)
ERYTHROCYTE [DISTWIDTH] IN BLOOD BY AUTOMATED COUNT: 14.4 % (ref 11.5–14.5)
EST. GFR  (NO RACE VARIABLE): 4.5 ML/MIN/1.73 M^2
GLUCOSE SERPL-MCNC: 91 MG/DL (ref 70–110)
HCT VFR BLD AUTO: 30.4 % (ref 37–48.5)
HGB BLD-MCNC: 9.3 G/DL (ref 12–16)
IMM GRANULOCYTES # BLD AUTO: 0.07 K/UL (ref 0–0.04)
IMM GRANULOCYTES NFR BLD AUTO: 0.6 % (ref 0–0.5)
LYMPHOCYTES # BLD AUTO: 1.1 K/UL (ref 1–4.8)
LYMPHOCYTES NFR BLD: 9.1 % (ref 18–48)
MAGNESIUM SERPL-MCNC: 2 MG/DL (ref 1.6–2.6)
MCH RBC QN AUTO: 29.2 PG (ref 27–31)
MCHC RBC AUTO-ENTMCNC: 30.6 G/DL (ref 32–36)
MCV RBC AUTO: 95 FL (ref 82–98)
MONOCYTES # BLD AUTO: 1.5 K/UL (ref 0.3–1)
MONOCYTES NFR BLD: 13.3 % (ref 4–15)
NEUTROPHILS # BLD AUTO: 8.7 K/UL (ref 1.8–7.7)
NEUTROPHILS NFR BLD: 75.8 % (ref 38–73)
NRBC BLD-RTO: 0 /100 WBC
PLATELET # BLD AUTO: 392 K/UL (ref 150–450)
PMV BLD AUTO: 9.9 FL (ref 9.2–12.9)
POTASSIUM SERPL-SCNC: 5 MMOL/L (ref 3.5–5.1)
PROCALCITONIN SERPL IA-MCNC: 5.65 NG/ML (ref 0–0.5)
PROT SERPL-MCNC: 7 G/DL (ref 6–8.4)
RBC # BLD AUTO: 3.19 M/UL (ref 4–5.4)
SODIUM SERPL-SCNC: 137 MMOL/L (ref 136–145)
TROPONIN I SERPL HS-MCNC: 53.3 PG/ML (ref 0–14.9)
TROPONIN I SERPL HS-MCNC: 60.6 PG/ML (ref 0–14.9)
WBC # BLD AUTO: 11.48 K/UL (ref 3.9–12.7)

## 2024-03-17 PROCEDURE — 84484 ASSAY OF TROPONIN QUANT: CPT

## 2024-03-17 PROCEDURE — 21400001 HC TELEMETRY ROOM

## 2024-03-17 PROCEDURE — 25000003 PHARM REV CODE 250

## 2024-03-17 PROCEDURE — 83735 ASSAY OF MAGNESIUM: CPT

## 2024-03-17 PROCEDURE — 99900035 HC TECH TIME PER 15 MIN (STAT)

## 2024-03-17 PROCEDURE — 63600175 PHARM REV CODE 636 W HCPCS: Performed by: INTERNAL MEDICINE

## 2024-03-17 PROCEDURE — 36415 COLL VENOUS BLD VENIPUNCTURE: CPT | Performed by: INTERNAL MEDICINE

## 2024-03-17 PROCEDURE — 84484 ASSAY OF TROPONIN QUANT: CPT | Mod: 91

## 2024-03-17 PROCEDURE — 96367 TX/PROPH/DG ADDL SEQ IV INF: CPT

## 2024-03-17 PROCEDURE — 87040 BLOOD CULTURE FOR BACTERIA: CPT | Performed by: HOSPITALIST

## 2024-03-17 PROCEDURE — 80053 COMPREHEN METABOLIC PANEL: CPT

## 2024-03-17 PROCEDURE — 36415 COLL VENOUS BLD VENIPUNCTURE: CPT

## 2024-03-17 PROCEDURE — 99233 SBSQ HOSP IP/OBS HIGH 50: CPT | Mod: ,,, | Performed by: INTERNAL MEDICINE

## 2024-03-17 PROCEDURE — 94761 N-INVAS EAR/PLS OXIMETRY MLT: CPT

## 2024-03-17 PROCEDURE — 84145 PROCALCITONIN (PCT): CPT | Performed by: HOSPITALIST

## 2024-03-17 PROCEDURE — 27000221 HC OXYGEN, UP TO 24 HOURS

## 2024-03-17 PROCEDURE — 25000003 PHARM REV CODE 250: Performed by: INTERNAL MEDICINE

## 2024-03-17 PROCEDURE — 99900031 HC PATIENT EDUCATION (STAT)

## 2024-03-17 PROCEDURE — 85379 FIBRIN DEGRADATION QUANT: CPT | Performed by: HOSPITALIST

## 2024-03-17 PROCEDURE — 85025 COMPLETE CBC W/AUTO DIFF WBC: CPT

## 2024-03-17 PROCEDURE — 87040 BLOOD CULTURE FOR BACTERIA: CPT | Mod: 59 | Performed by: INTERNAL MEDICINE

## 2024-03-17 RX ORDER — MIDODRINE HYDROCHLORIDE 10 MG/1
10 TABLET ORAL 3 TIMES DAILY
Status: DISCONTINUED | OUTPATIENT
Start: 2024-03-17 | End: 2024-03-19 | Stop reason: HOSPADM

## 2024-03-17 RX ORDER — GLYCERIN 1 G/1
1 SUPPOSITORY RECTAL ONCE
Status: COMPLETED | OUTPATIENT
Start: 2024-03-17 | End: 2024-03-17

## 2024-03-17 RX ADMIN — CALCIUM ACETATE 667 MG: 667 CAPSULE ORAL at 05:03

## 2024-03-17 RX ADMIN — MIDODRINE HYDROCHLORIDE 10 MG: 10 TABLET ORAL at 05:03

## 2024-03-17 RX ADMIN — ATORVASTATIN CALCIUM 40 MG: 40 TABLET, FILM COATED ORAL at 08:03

## 2024-03-17 RX ADMIN — DRONEDARONE 400 MG: 400 TABLET, FILM COATED ORAL at 08:03

## 2024-03-17 RX ADMIN — DRONEDARONE 400 MG: 400 TABLET, FILM COATED ORAL at 05:03

## 2024-03-17 RX ADMIN — APIXABAN 2.5 MG: 2.5 TABLET, FILM COATED ORAL at 08:03

## 2024-03-17 RX ADMIN — DORZOLAMIDE HYDROCHLORIDE AND TIMOLOL MALEATE 1 DROP: 22.3; 6.8 SOLUTION/ DROPS OPHTHALMIC at 08:03

## 2024-03-17 RX ADMIN — MIDODRINE HYDROCHLORIDE 5 MG: 2.5 TABLET ORAL at 08:03

## 2024-03-17 RX ADMIN — VANCOMYCIN HYDROCHLORIDE 1250 MG: 1.25 INJECTION, POWDER, LYOPHILIZED, FOR SOLUTION INTRAVENOUS at 12:03

## 2024-03-17 RX ADMIN — ACETAMINOPHEN 650 MG: 325 TABLET ORAL at 03:03

## 2024-03-17 RX ADMIN — CALCIUM ACETATE 667 MG: 667 CAPSULE ORAL at 08:03

## 2024-03-17 RX ADMIN — CALCIUM ACETATE 667 MG: 667 CAPSULE ORAL at 12:03

## 2024-03-17 RX ADMIN — GLYCERIN 1 SUPPOSITORY: 2 SUPPOSITORY RECTAL at 03:03

## 2024-03-17 RX ADMIN — LATANOPROST 1 DROP: 50 SOLUTION OPHTHALMIC at 08:03

## 2024-03-17 RX ADMIN — PIPERACILLIN SODIUM AND TAZOBACTAM SODIUM 3.38 G: 3; .375 INJECTION, POWDER, LYOPHILIZED, FOR SOLUTION INTRAVENOUS at 02:03

## 2024-03-17 RX ADMIN — Medication 1 TABLET: at 08:03

## 2024-03-17 NOTE — PLAN OF CARE
Problem: Infection  Goal: Absence of Infection Signs and Symptoms  Outcome: Ongoing, Progressing     Problem: Nausea and Vomiting  Goal: Fluid and Electrolyte Balance  Outcome: Ongoing, Progressing     Problem: Fall Injury Risk  Goal: Absence of Fall and Fall-Related Injury  Outcome: Ongoing, Progressing     Problem: Skin Injury Risk Increased  Goal: Skin Health and Integrity  Outcome: Ongoing, Progressing

## 2024-03-17 NOTE — PROGRESS NOTES
VANCOMYCIN PHARMACOKINETIC NOTE:  Vancomycin Day # 1    Objective/Assessment:    Diagnosis/Indication for Vancomycin: Pneumonia     83 y.o., female; Actual Body Weight = 54.5 kg (120 lb 2.4 oz).    The patient has the following labs:  3/17/2024 Estimated Creatinine Clearance: 4.5 mL/min (A) (based on SCr of 8.2 mg/dL (H)). Lab Results   Component Value Date    BUN 54 (H) 03/17/2024     Lab Results   Component Value Date    WBC 11.48 03/17/2024        Patient is receiving hemodialysis therapy.    Plan for Dialysis Patient:  Give first dose of IV vancomycin.    Vancomycin dose = 23 mg/kg actual body weight    Will follow random vancomycin levels and redose when serum vancomycin level decreases to 10-15 mcg/mL  Random vancomycin level has been ordered prior to dialysis on 3/20/24 with AM labs.    Thank you for allowing us to participate in this patient's care.     Stephanie Wing 3/17/2024 11:48 AM  Department of Pharmacy  Ext [phone]

## 2024-03-17 NOTE — NURSING
BP 86/39. MAP 57. Dr. Ventura notified of BP and that her home dose of midodrine is higher than what she is taking here. MD ordered to continue her home dose.

## 2024-03-17 NOTE — PROGRESS NOTES
[facility]  Progress Note    Patient Name: Tyra Isaac  MRN: [id number]  Patient Class: OP- Observation   Admission Date: 3/15/2024  Length of Stay: 0 days  Attending Physician: Denzel Ventura Jr., MD  Primary Care Provider: Kalpesh Goel MD        Subjective:     Principal Problem:Chest pain        HPI:  83-year-old female presented to ED for eval. Patient is ESRD on HD. Patient was in the middle of dialysis with about 45 minutes left when she became hypotensive with SBP in the 80s, responded to fluid bolus. She also developed substernal chest pressure at that time which did resolve. She also had persistent narrow complex tachycardia after CP. On arrival to ED patient's , she was in AF RVR, which converted to SR after brief period on cardizem gtt. Plan was possible discharge home however patient had positive troponin with increase in second troponin in ED. ED discussed case with nephrology, plan for admission for trending troponins. Admit to hospital medicine for further eval.     Overview/Hospital Course:  No notes on file    Interval History:  Patient had fever overnight.  Patient had chest x-ray overnight and protocol which was elevated.  X-ray concerning for possible pneumonia.  We will start on HAP covered    Review of Systems  Objective:     Vital Signs (Most Recent):  Temp: 98.7 °F (37.1 °C) (03/17/24 0500)  Pulse: 70 (03/17/24 0808)  Resp: 20 (03/17/24 0808)  BP: (!) 116/51 (03/17/24 0700)  SpO2: 98 % (03/17/24 0808) Vital Signs (24h Range):  Temp:  [98.1 °F (36.7 °C)-101.3 °F (38.5 °C)] 98.7 °F (37.1 °C)  Pulse:  [70-83] 70  Resp:  [18-30] 20  SpO2:  [85 %-99 %] 98 %  BP: (110-128)/(50-60) 116/51     Weight: 54.5 kg (120 lb 2.4 oz)  Body mass index is 19.99 kg/m².    Intake/Output Summary (Last 24 hours) at 3/17/2024 1145  Last data filed at 3/17/2024 0501  Gross per 24 hour   Intake 580 ml   Output 0 ml   Net 580 ml         Physical Exam  Vitals reviewed.    Constitutional:       General: She is not in acute distress.     Appearance: Normal appearance.   HENT:      Head: Normocephalic and atraumatic.      Right Ear: External ear normal.      Left Ear: External ear normal.      Nose: Nose normal.      Mouth/Throat:      Mouth: Mucous membranes are moist.      Pharynx: Oropharynx is clear.   Eyes:      Extraocular Movements: Extraocular movements intact.      Conjunctiva/sclera: Conjunctivae normal.   Cardiovascular:      Rate and Rhythm: Normal rate and regular rhythm.      Pulses: Normal pulses.      Heart sounds: Normal heart sounds. No murmur heard.     No gallop.   Pulmonary:      Effort: Pulmonary effort is normal. No respiratory distress.      Breath sounds: Normal breath sounds. No wheezing or rales.   Abdominal:      General: Abdomen is flat. There is no distension.      Palpations: Abdomen is soft.      Tenderness: There is no abdominal tenderness. There is no guarding.   Musculoskeletal:         General: No swelling. Normal range of motion.      Cervical back: Normal range of motion and neck supple.   Skin:     General: Skin is warm and dry.   Neurological:      General: No focal deficit present.      Mental Status: She is alert and oriented to person, place, and time.             Significant Labs: All pertinent labs within the past 24 hours have been reviewed.    Significant Imaging: I have reviewed all pertinent imaging results/findings within the past 24 hours.    Assessment/Plan:      * Chest pain  Elevated troponins x2 in ED with increase  Serial troponins -stable  Likely elevated secondary to ESRD.  Denies any further chest pain.      Fever  Likely secondary to pneumonia.  Repeat chest x-ray concerning for pneumonia.  Protocol ordered overnight was positive.  We will start Zosyn and vanc.  Monitor for improvement.  Blood culture sent, no growth to date    ESRD (end stage renal disease)  Creatine stable for now. BMP reviewed- noted Estimated Creatinine  Clearance: 5.7 mL/min (A) (based on SCr of 6.4 mg/dL (H)). according to latest data. Based on current GFR, CKD stage is end stage.  Monitor UOP and serial BMP and adjust therapy as needed. Renally dose meds. Avoid nephrotoxic medications and procedures.  Nephrology following.  Monday Wednesday Friday dialysis.  States she did 4 hours of dialysis yesterday.    Hypotension  Resolved  Resume home midodrine  Monitor BP    Atrial fibrillation with RVR with history of known paroxysmal atrial fibrillation  AF RVR on arrival, converted to SR s/p cardizem  Patient with Paroxysmal (<7 days) atrial fibrillation which is controlled currently with  multaq . Patient is currently in sinus rhythm.[id number] Score: 4. Anticoagulation indicated. Anticoagulation done with eliquis .      VTE Risk Mitigation (From admission, onward)           Ordered     apixaban tablet 2.5 mg  2 times daily         03/15/24 1620     Reason for No Pharmacological VTE Prophylaxis  Once        Question:  Reasons:  Answer:  Already adequately anticoagulated on oral Anticoagulants    03/15/24 1617     IP VTE HIGH RISK PATIENT  Once         03/15/24 1617     Place sequential compression device  Until discontinued         03/15/24 1617                    Discharge Planning   ILAN:      Code Status: Full Code   Is the patient medically ready for discharge?:     Reason for patient still in hospital (select all that apply): Treatment  Discharge Plan A: Home with family                  Denzel Ventura Jr, MD  Department of Hospital Medicine   [facility]

## 2024-03-17 NOTE — SUBJECTIVE & OBJECTIVE
Interval History:  Patient had fever overnight.  Patient had chest x-ray overnight and protocol which was elevated.  X-ray concerning for possible pneumonia.  We will start on HAP covered    Review of Systems  Objective:     Vital Signs (Most Recent):  Temp: 98.7 °F (37.1 °C) (03/17/24 0500)  Pulse: 70 (03/17/24 0808)  Resp: 20 (03/17/24 0808)  BP: (!) 116/51 (03/17/24 0700)  SpO2: 98 % (03/17/24 0808) Vital Signs (24h Range):  Temp:  [98.1 °F (36.7 °C)-101.3 °F (38.5 °C)] 98.7 °F (37.1 °C)  Pulse:  [70-83] 70  Resp:  [18-30] 20  SpO2:  [85 %-99 %] 98 %  BP: (110-128)/(50-60) 116/51     Weight: 54.5 kg (120 lb 2.4 oz)  Body mass index is 19.99 kg/m².    Intake/Output Summary (Last 24 hours) at 3/17/2024 1145  Last data filed at 3/17/2024 0501  Gross per 24 hour   Intake 580 ml   Output 0 ml   Net 580 ml         Physical Exam  Vitals reviewed.   Constitutional:       General: She is not in acute distress.     Appearance: Normal appearance.   HENT:      Head: Normocephalic and atraumatic.      Right Ear: External ear normal.      Left Ear: External ear normal.      Nose: Nose normal.      Mouth/Throat:      Mouth: Mucous membranes are moist.      Pharynx: Oropharynx is clear.   Eyes:      Extraocular Movements: Extraocular movements intact.      Conjunctiva/sclera: Conjunctivae normal.   Cardiovascular:      Rate and Rhythm: Normal rate and regular rhythm.      Pulses: Normal pulses.      Heart sounds: Normal heart sounds. No murmur heard.     No gallop.   Pulmonary:      Effort: Pulmonary effort is normal. No respiratory distress.      Breath sounds: Normal breath sounds. No wheezing or rales.   Abdominal:      General: Abdomen is flat. There is no distension.      Palpations: Abdomen is soft.      Tenderness: There is no abdominal tenderness. There is no guarding.   Musculoskeletal:         General: No swelling. Normal range of motion.      Cervical back: Normal range of motion and neck supple.   Skin:     General:  Skin is warm and dry.   Neurological:      General: No focal deficit present.      Mental Status: She is alert and oriented to person, place, and time.             Significant Labs: All pertinent labs within the past 24 hours have been reviewed.    Significant Imaging: I have reviewed all pertinent imaging results/findings within the past 24 hours.

## 2024-03-17 NOTE — NURSING
Pt spiked fever of 101.3 and symptomatic. /58, HR 80, O2 saturations mid 80s, 2L NC placed on pt and saturations came up to 97%. PRN tylenol given. Dr. Valentine notified. Stat UA and blood cultures ordered. Attempted to get UA and unsuccessful. Dr. Valentine notified. Blood cultures drawn and sent to lab.

## 2024-03-17 NOTE — ASSESSMENT & PLAN NOTE
Likely secondary to pneumonia.  Repeat chest x-ray concerning for pneumonia.  Protocol ordered overnight was positive.  We will start Zosyn and vanc.  Monitor for improvement.  Blood culture sent, no growth to date

## 2024-03-17 NOTE — CARE UPDATE
03/17/24 0808   Patient Assessment/Suction   Level of Consciousness (AVPU) alert   Respiratory Effort Unlabored   Expansion/Accessory Muscles/Retractions expansion symmetric   All Lung Fields Breath Sounds Anterior:;Lateral:;diminished   Rhythm/Pattern, Respiratory depth regular;pattern regular;unlabored   PRE-TX-O2   Device (Oxygen Therapy) nasal cannula   $ Is the patient on Low Flow Oxygen? Yes   Flow (L/min) 1   SpO2 98 %   Pulse Oximetry Type Intermittent   $ Pulse Oximetry - Multiple Charge Pulse Oximetry - Multiple   Pulse 70   Resp 20   Aerosol Therapy   $ Aerosol Therapy Charges PRN treatment not required   Education   $ Education Bronchodilator;15 min

## 2024-03-17 NOTE — PROGRESS NOTES
INPATIENT NEPHROLOGY Progress Note  [facility]    Patient Name: Tyra Isaac  Date: 03/17/2024    Reason for consultation: ESRD    Chief Complaint:   Chief Complaint   Patient presents with    Tachycardia     SVT at HD.         History of Present Illness:  83-year-old female presented to ED for eval. Patient is ESRD on HD. Patient was in the middle of dialysis with about 45 minutes left when she became hypotensive with SBP in the 80s, responded to fluid bolus. She also developed substernal chest pressure at that time which did resolve. She also had persistent narrow complex tachycardia after CP. On arrival to ED patient's , she was in AF RVR, which converted to SR after brief period on cardizem gtt. Plan was possible discharge home however patient had positive troponin with increase in second troponin in ED. Consulted for dialysis.     Interval History:  3/16- trop stabilized on last 3 checks- off cardizem- c/o constipation  3/17- febrile, BP stable, on 2L NC, trop rising, 3/17 blood cx neg thus far- ordered CXR and KUB- patient urinated but she said nurse didn't take sample for collection (UA,UCx ordered)- she is quite ill appearing today    Plan of Care:    Assessment:  ESRD on HD MWF  AF with RVR  Chronic hypotension (with intradialytic exacerbations)  Elevated troponin  SHPT  Anemia of CKD  Fever    Plan:    - no acute RRT needs- continue HD MWF  - back in NSR - continue multaq and eliquis  - continue midodrine  - trop rising again- not sure if fever related- ordered CXR- consider cards consult  - continue binder with meals  - ordered BRAYAN with HD  - blood cx neg thus far- awaiting UA/UCx- ordered CXR given increased O2 requirement- check KUB given constipation    Update:   CXR- Faint reticular nodular densities, asymmetrically greater on the left, concerning for infectious/inflammatory process in the setting of fever (potentially aspiration).   KUB- Moderate gas and fecal  material in the rectum, potentially related to constipation.  - would start antbx for PNA  - would start bowel regimen    Thank you for allowing us to participate in this patient's care. We will continue to follow.    Vital Signs:  Temp Readings from Last 3 Encounters:   03/17/24 98.7 °F (37.1 °C)   01/08/24 98.8 °F (37.1 °C)   12/21/23 98.6 °F (37 °C) (Oral)       Pulse Readings from Last 3 Encounters:   03/17/24 81   01/11/24 109   01/08/24 85       BP Readings from Last 3 Encounters:   03/17/24 121/60   01/11/24 (!) 87/53   01/08/24 (!) 126/50       Weight:  Wt Readings from Last 3 Encounters:   03/15/24 54.5 kg (120 lb 2.4 oz)   01/11/24 44 kg (97 lb)   01/04/24 49.9 kg (110 lb)         Medications:  Scheduled Meds:   apixaban  2.5 mg Oral BID    atorvastatin  40 mg Oral Daily    calcium acetate(phosphat bind)  667 mg Oral TID WM    dorzolamide-timolol 2-0.5%  1 drop Both Eyes BID    dronedarone  400 mg Oral BID WM    latanoprost  1 drop Both Eyes QHS    midodrine  5 mg Oral TID    polyethylene glycol  17 g Oral Daily    vit b cmplx 3-fa-vit c-biotin 1- mg-mg-mcg  1 tablet Oral Daily     Continuous Infusions:   dilTIAZem Stopped (03/15/24 1105)     PRN Meds:.acetaminophen, albuterol-ipratropium, aluminum-magnesium hydroxide-simethicone, melatonin, naloxone, nitroGLYCERIN, ondansetron, senna-docusate 8.6-50 mg, sodium chloride 0.9%  No current facility-administered medications on file prior to encounter.     Current Outpatient Medications on File Prior to Encounter   Medication Sig Dispense Refill    atorvastatin (LIPITOR) 40 MG tablet Take 40 mg by mouth once daily.      b complex vitamins tablet Take 1 tablet by mouth once daily.      calcium acetate,phosphat bind, (PHOSLO) 667 mg capsule Take 667 mg by mouth 3 (three) times daily with meals.      dorzolamide-timolol 2-0.5% (COSOPT) 22.3-6.8 mg/mL ophthalmic solution Place 1 drop into both eyes 2 (two) times daily.      dronedarone (MULTAQ) 400 mg Tab  Take 1 tablet (400 mg total) by mouth 2 (two) times daily with meals. 180 tablet 0    ELIQUIS 2.5 mg Tab Take 2.5 mg by mouth 2 (two) times daily.      latanoprost 0.005 % ophthalmic solution Place 1 drop into both eyes every evening.      loperamide (IMODIUM A-D) 2 mg Tab Take 1 tablet (2 mg total) by mouth 4 (four) times daily as needed (diarrhea). 3 tablet 0    midodrine (PROAMATINE) 10 MG tablet Take 1 tablet (10 mg total) by mouth 3 (three) times daily with meals. 270 tablet 0    ondansetron (ZOFRAN-ODT) 4 MG TbDL Take 1 tablet (4 mg total) by mouth every 6 (six) hours as needed (nausea). 30 tablet 5    albuterol (VENTOLIN HFA) 90 mcg/actuation inhaler Inhale 2 puffs into the lungs every 6 (six) hours as needed for Wheezing. Rescue 8 g 2       Review of Systems:  Neg    Physical Exam:  General Appearance:    Ill appearing   Head:    Normocephalic, atraumatic   Eyes:    PER, EOMI, and conjunctiva/sclera clear bilaterally       Mouth:   Moist mucus membranes, no thrush or oral lesions,       normal dentition   Back:     No CVA tenderness   Lungs:     Coarse   Chest wall:    No tenderness or deformity   Heart:    Regular rate and rhythm, S1 and S2 normal, no murmur, rub   or gallop   Abdomen:     Soft, non-tender, non-distended, bowel sounds active all four   quadrants, no RT or guarding, no masses, no organomegaly   Extremities:   Warm and well perfused, distal pulses are intact, no             cyanosis or peripheral edema   MSK:   No joint or muscle swelling, tenderness or deformity   Skin:   Skin color, texture, turgor normal, no rashes or lesions   Neurologic/Psychiatric:   CNII-XII intact, normal strength and sensation       throughout, no asterixis; normal affect, memory, judgement     and insight      Results:  Lab Results   Component Value Date     03/17/2024    K 5.0 03/17/2024    CL 96 03/17/2024    CO2 31 (H) 03/17/2024    BUN 54 (H) 03/17/2024    CREATININE 8.2 (H) 03/17/2024    CALCIUM 9.1  03/17/2024    ANIONGAP 10 03/17/2024    ESTGFRAFRICA 4.7 (A) 02/08/2022    EGFRNONAA 4.0 (A) 02/08/2022       Lab Results   Component Value Date    CALCIUM 9.1 03/17/2024    PHOS 3.1 03/16/2024       Recent Labs   Lab 03/17/24  0642   WBC 11.48   RBC 3.19*   HGB 9.3*   HCT 30.4*      MCV 95   MCH 29.2   MCHC 30.6*         I have personally reviewed pertinent radiological imaging and reports.    I have spent > 35 minutes providing care for this patient for the above diagnoses. These services have included chart/data/imaging review, evaluation, exam, formulation of plan, , note preparation, and discussions with staff involved in this patient's care.    Rose Maynard MD MPH  [facility] [place]  [address] [place] [id number]  [phone] (p)  [phone] (f)

## 2024-03-18 LAB
ALBUMIN SERPL BCP-MCNC: 2.9 G/DL (ref 3.5–5.2)
ALP SERPL-CCNC: 48 U/L (ref 55–135)
ALT SERPL W/O P-5'-P-CCNC: 8 U/L (ref 10–44)
ANION GAP SERPL CALC-SCNC: 12 MMOL/L (ref 8–16)
AST SERPL-CCNC: 12 U/L (ref 10–40)
BASOPHILS # BLD AUTO: 0.03 K/UL (ref 0–0.2)
BASOPHILS NFR BLD: 0.3 % (ref 0–1.9)
BILIRUB SERPL-MCNC: 0.4 MG/DL (ref 0.1–1)
BUN SERPL-MCNC: 70 MG/DL (ref 8–23)
CALCIUM SERPL-MCNC: 8.8 MG/DL (ref 8.7–10.5)
CHLORIDE SERPL-SCNC: 94 MMOL/L (ref 95–110)
CO2 SERPL-SCNC: 29 MMOL/L (ref 23–29)
CREAT SERPL-MCNC: 9.5 MG/DL (ref 0.5–1.4)
DIFFERENTIAL METHOD BLD: ABNORMAL
EOSINOPHIL # BLD AUTO: 0.5 K/UL (ref 0–0.5)
EOSINOPHIL NFR BLD: 5.9 % (ref 0–8)
ERYTHROCYTE [DISTWIDTH] IN BLOOD BY AUTOMATED COUNT: 14.6 % (ref 11.5–14.5)
EST. GFR  (NO RACE VARIABLE): 3.8 ML/MIN/1.73 M^2
GLUCOSE SERPL-MCNC: 78 MG/DL (ref 70–110)
HCT VFR BLD AUTO: 28.4 % (ref 37–48.5)
HGB BLD-MCNC: 8.8 G/DL (ref 12–16)
IMM GRANULOCYTES # BLD AUTO: 0.03 K/UL (ref 0–0.04)
IMM GRANULOCYTES NFR BLD AUTO: 0.3 % (ref 0–0.5)
LYMPHOCYTES # BLD AUTO: 0.8 K/UL (ref 1–4.8)
LYMPHOCYTES NFR BLD: 8.2 % (ref 18–48)
MAGNESIUM SERPL-MCNC: 2.1 MG/DL (ref 1.6–2.6)
MCH RBC QN AUTO: 29 PG (ref 27–31)
MCHC RBC AUTO-ENTMCNC: 31 G/DL (ref 32–36)
MCV RBC AUTO: 94 FL (ref 82–98)
MONOCYTES # BLD AUTO: 1.5 K/UL (ref 0.3–1)
MONOCYTES NFR BLD: 15.9 % (ref 4–15)
MRSA SCREEN BY PCR: NEGATIVE
NEUTROPHILS # BLD AUTO: 6.4 K/UL (ref 1.8–7.7)
NEUTROPHILS NFR BLD: 69.4 % (ref 38–73)
NRBC BLD-RTO: 0 /100 WBC
PLATELET # BLD AUTO: 332 K/UL (ref 150–450)
PMV BLD AUTO: 10 FL (ref 9.2–12.9)
POTASSIUM SERPL-SCNC: 5.2 MMOL/L (ref 3.5–5.1)
PROT SERPL-MCNC: 6.9 G/DL (ref 6–8.4)
RBC # BLD AUTO: 3.03 M/UL (ref 4–5.4)
SODIUM SERPL-SCNC: 135 MMOL/L (ref 136–145)
WBC # BLD AUTO: 9.19 K/UL (ref 3.9–12.7)

## 2024-03-18 PROCEDURE — 80053 COMPREHEN METABOLIC PANEL: CPT

## 2024-03-18 PROCEDURE — 21400001 HC TELEMETRY ROOM

## 2024-03-18 PROCEDURE — 27000221 HC OXYGEN, UP TO 24 HOURS

## 2024-03-18 PROCEDURE — 5A1D70Z PERFORMANCE OF URINARY FILTRATION, INTERMITTENT, LESS THAN 6 HOURS PER DAY: ICD-10-PCS | Performed by: INTERNAL MEDICINE

## 2024-03-18 PROCEDURE — 97161 PT EVAL LOW COMPLEX 20 MIN: CPT

## 2024-03-18 PROCEDURE — 85025 COMPLETE CBC W/AUTO DIFF WBC: CPT

## 2024-03-18 PROCEDURE — 83735 ASSAY OF MAGNESIUM: CPT

## 2024-03-18 PROCEDURE — 99233 SBSQ HOSP IP/OBS HIGH 50: CPT | Mod: ,,, | Performed by: INTERNAL MEDICINE

## 2024-03-18 PROCEDURE — 90935 HEMODIALYSIS ONE EVALUATION: CPT

## 2024-03-18 PROCEDURE — 94761 N-INVAS EAR/PLS OXIMETRY MLT: CPT

## 2024-03-18 PROCEDURE — 87641 MR-STAPH DNA AMP PROBE: CPT | Performed by: INTERNAL MEDICINE

## 2024-03-18 PROCEDURE — 97530 THERAPEUTIC ACTIVITIES: CPT

## 2024-03-18 PROCEDURE — 63600175 PHARM REV CODE 636 W HCPCS: Mod: JZ,JG | Performed by: INTERNAL MEDICINE

## 2024-03-18 PROCEDURE — 36415 COLL VENOUS BLD VENIPUNCTURE: CPT

## 2024-03-18 PROCEDURE — 25000003 PHARM REV CODE 250

## 2024-03-18 PROCEDURE — 99900035 HC TECH TIME PER 15 MIN (STAT)

## 2024-03-18 PROCEDURE — 25000003 PHARM REV CODE 250: Performed by: INTERNAL MEDICINE

## 2024-03-18 PROCEDURE — 99900031 HC PATIENT EDUCATION (STAT)

## 2024-03-18 PROCEDURE — 63600175 PHARM REV CODE 636 W HCPCS: Performed by: INTERNAL MEDICINE

## 2024-03-18 RX ADMIN — POLYETHYLENE GLYCOL 3350 17 G: 17 POWDER, FOR SOLUTION ORAL at 08:03

## 2024-03-18 RX ADMIN — APIXABAN 2.5 MG: 2.5 TABLET, FILM COATED ORAL at 09:03

## 2024-03-18 RX ADMIN — CALCIUM ACETATE 667 MG: 667 CAPSULE ORAL at 11:03

## 2024-03-18 RX ADMIN — CALCIUM ACETATE 667 MG: 667 CAPSULE ORAL at 04:03

## 2024-03-18 RX ADMIN — ATORVASTATIN CALCIUM 40 MG: 40 TABLET, FILM COATED ORAL at 08:03

## 2024-03-18 RX ADMIN — DORZOLAMIDE HYDROCHLORIDE AND TIMOLOL MALEATE 1 DROP: 22.3; 6.8 SOLUTION/ DROPS OPHTHALMIC at 08:03

## 2024-03-18 RX ADMIN — PIPERACILLIN SODIUM AND TAZOBACTAM SODIUM 3.38 G: 3; .375 INJECTION, POWDER, LYOPHILIZED, FOR SOLUTION INTRAVENOUS at 02:03

## 2024-03-18 RX ADMIN — Medication 1 TABLET: at 08:03

## 2024-03-18 RX ADMIN — DORZOLAMIDE HYDROCHLORIDE AND TIMOLOL MALEATE 1 DROP: 22.3; 6.8 SOLUTION/ DROPS OPHTHALMIC at 09:03

## 2024-03-18 RX ADMIN — CALCIUM ACETATE 667 MG: 667 CAPSULE ORAL at 08:03

## 2024-03-18 RX ADMIN — MIDODRINE HYDROCHLORIDE 10 MG: 10 TABLET ORAL at 11:03

## 2024-03-18 RX ADMIN — MIDODRINE HYDROCHLORIDE 10 MG: 10 TABLET ORAL at 08:03

## 2024-03-18 RX ADMIN — DRONEDARONE 400 MG: 400 TABLET, FILM COATED ORAL at 08:03

## 2024-03-18 RX ADMIN — DRONEDARONE 400 MG: 400 TABLET, FILM COATED ORAL at 04:03

## 2024-03-18 RX ADMIN — APIXABAN 2.5 MG: 2.5 TABLET, FILM COATED ORAL at 08:03

## 2024-03-18 RX ADMIN — LATANOPROST 1 DROP: 50 SOLUTION OPHTHALMIC at 09:03

## 2024-03-18 RX ADMIN — MIDODRINE HYDROCHLORIDE 10 MG: 10 TABLET ORAL at 04:03

## 2024-03-18 RX ADMIN — EPOETIN ALFA-EPBX 5500 UNITS: 10000 INJECTION, SOLUTION INTRAVENOUS; SUBCUTANEOUS at 03:03

## 2024-03-18 NOTE — PROGRESS NOTES
INPATIENT NEPHROLOGY Progress Note  [facility]    Patient Name: Tyra Isaac  Date: 03/18/2024    Reason for consultation: ESRD    Chief Complaint:   Chief Complaint   Patient presents with    Tachycardia     SVT at HD.         History of Present Illness:  83-year-old female presented to ED for eval. Patient is ESRD on HD. Patient was in the middle of dialysis with about 45 minutes left when she became hypotensive with SBP in the 80s, responded to fluid bolus. She also developed substernal chest pressure at that time which did resolve. She also had persistent narrow complex tachycardia after CP. On arrival to ED patient's , she was in AF RVR, which converted to SR after brief period on cardizem gtt. Plan was possible discharge home however patient had positive troponin with increase in second troponin in ED. Consulted for dialysis.     Interval History:  3/16- trop stabilized on last 3 checks- off cardizem- c/o constipation  3/17- febrile, BP stable, on 2L NC, trop rising, 3/17 blood cx neg thus far- ordered CXR and KUB- patient urinated but she said nurse didn't take sample for collection (UA,UCx ordered)- she is quite ill appearing today  3/18 VSS. Seen on HD today.    Plan of Care:    ESRD on HD MWF  AF with RVR  Chronic hypotension (with intradialytic exacerbations)  Elevated troponin  Secondary HPT  Anemia of CKD  Hyperkalemia  Hyponatremia  Fever    Plan:    - continue HD MWF  - back in NSR - continue multaq and eliquis  - continue midodrine  - trop rising again- not sure if fever related- ordered CXR- consider cards consult  - continue binder with meals  - ordered BRAYAN with HD  - blood cx neg thus far- awaiting UA/UCx- ordered CXR given increased O2 requirement- check KUB given constipation  - continue renal diet, Lokelma if she has more persistent hyperkalemia, optimize bowel regimen    Update:   CXR- Faint reticular nodular densities, asymmetrically greater on the left,  concerning for infectious/inflammatory process in the setting of fever (potentially aspiration).   KUB- Moderate gas and fecal material in the rectum, potentially related to constipation.  - started antbx for PNA  - started bowel regimen    Thank you for allowing us to participate in this patient's care. We will continue to follow.    Vital Signs:  Temp Readings from Last 3 Encounters:   03/18/24 98 °F (36.7 °C) (Oral)   01/08/24 98.8 °F (37.1 °C)   12/21/23 98.6 °F (37 °C) (Oral)       Pulse Readings from Last 3 Encounters:   03/18/24 82   01/11/24 109   01/08/24 85       BP Readings from Last 3 Encounters:   03/18/24 139/60   01/11/24 (!) 87/53   01/08/24 (!) 126/50       Weight:  Wt Readings from Last 3 Encounters:   03/15/24 54.5 kg (120 lb 2.4 oz)   01/11/24 44 kg (97 lb)   01/04/24 49.9 kg (110 lb)         Medications:  Scheduled Meds:   apixaban  2.5 mg Oral BID    atorvastatin  40 mg Oral Daily    calcium acetate(phosphat bind)  667 mg Oral TID WM    dorzolamide-timolol 2-0.5%  1 drop Both Eyes BID    dronedarone  400 mg Oral BID WM    epoetin rosy-epbx  100 Units/kg Subcutaneous Every Mon, Wed, Fri    latanoprost  1 drop Both Eyes QHS    midodrine  10 mg Oral TID    piperacillin-tazobactam (Zosyn) IV (PEDS and ADULTS) (extended infusion is not appropriate)  3.375 g Intravenous Q12H    polyethylene glycol  17 g Oral Daily    vit b cmplx 3-fa-vit c-biotin 1- mg-mg-mcg  1 tablet Oral Daily     Continuous Infusions:      PRN Meds:.acetaminophen, albuterol-ipratropium, aluminum-magnesium hydroxide-simethicone, melatonin, naloxone, nitroGLYCERIN, ondansetron, senna-docusate 8.6-50 mg, sodium chloride 0.9%, Pharmacy to dose Vancomycin consult **AND** vancomycin - pharmacy to dose  No current facility-administered medications on file prior to encounter.     Current Outpatient Medications on File Prior to Encounter   Medication Sig Dispense Refill    atorvastatin (LIPITOR) 40 MG tablet Take 40 mg by mouth once  daily.      b complex vitamins tablet Take 1 tablet by mouth once daily.      calcium acetate,phosphat bind, (PHOSLO) 667 mg capsule Take 667 mg by mouth 3 (three) times daily with meals.      dorzolamide-timolol 2-0.5% (COSOPT) 22.3-6.8 mg/mL ophthalmic solution Place 1 drop into both eyes 2 (two) times daily.      dronedarone (MULTAQ) 400 mg Tab Take 1 tablet (400 mg total) by mouth 2 (two) times daily with meals. 180 tablet 0    ELIQUIS 2.5 mg Tab Take 2.5 mg by mouth 2 (two) times daily.      latanoprost 0.005 % ophthalmic solution Place 1 drop into both eyes every evening.      loperamide (IMODIUM A-D) 2 mg Tab Take 1 tablet (2 mg total) by mouth 4 (four) times daily as needed (diarrhea). 3 tablet 0    midodrine (PROAMATINE) 10 MG tablet Take 1 tablet (10 mg total) by mouth 3 (three) times daily with meals. 270 tablet 0    ondansetron (ZOFRAN-ODT) 4 MG TbDL Take 1 tablet (4 mg total) by mouth every 6 (six) hours as needed (nausea). 30 tablet 5    albuterol (VENTOLIN HFA) 90 mcg/actuation inhaler Inhale 2 puffs into the lungs every 6 (six) hours as needed for Wheezing. Rescue 8 g 2       Review of Systems:  Neg    Physical Exam:  General Appearance:    Ill appearing   Head:    Normocephalic, atraumatic   Eyes:    PER, EOMI, and conjunctiva/sclera clear bilaterally       Mouth:   Moist mucus membranes, no thrush or oral lesions,       normal dentition   Back:     No CVA tenderness   Lungs:     Coarse   Chest wall:    No tenderness or deformity   Heart:    Regular rate and rhythm, S1 and S2 normal, no murmur, rub   or gallop   Abdomen:     Soft, non-tender, non-distended, bowel sounds active all four   quadrants, no RT or guarding, no masses, no organomegaly   Extremities:   Warm and well perfused, distal pulses are intact, no             cyanosis or peripheral edema   MSK:   No joint or muscle swelling, tenderness or deformity   Skin:   Skin color, texture, turgor normal, no rashes or lesions    Neurologic/Psychiatric:   CNII-XII intact, normal strength and sensation       throughout, no asterixis; normal affect, memory, judgement     and insight      Results:  Lab Results   Component Value Date     (L) 03/18/2024    K 5.2 (H) 03/18/2024    CL 94 (L) 03/18/2024    CO2 29 03/18/2024    BUN 70 (H) 03/18/2024    CREATININE 9.5 (H) 03/18/2024    CALCIUM 8.8 03/18/2024    ANIONGAP 12 03/18/2024    ESTGFRAFRICA 4.7 (A) 02/08/2022    EGFRNONAA 4.0 (A) 02/08/2022       Lab Results   Component Value Date    CALCIUM 8.8 03/18/2024    PHOS 3.1 03/16/2024       Recent Labs   Lab 03/18/24  0436   WBC 9.19   RBC 3.03*   HGB 8.8*   HCT 28.4*      MCV 94   MCH 29.0   MCHC 31.0*       I have spent >  minutes providing care for this patient for the above diagnoses. These services have included chart/data/imaging review, evaluation, exam, formulation of plan, , note preparation, and discussions with staff involved in this patient's care.    Raleigh Yee MD  [facility] [address] [address]  [phone] (p)  [phone] (f)

## 2024-03-18 NOTE — PROGRESS NOTES
[facility] Medicine  Progress Note    Patient Name: Tyra Isaac  MRN: [id number]  Patient Class: IP- Inpatient   Admission Date: 3/15/2024  Length of Stay: 1 days  Attending Physician: Denzel Ventura Jr., MD  Primary Care Provider: Kalpesh Goel MD        Subjective:     Principal Problem:Chest pain        HPI:  83-year-old female presented to ED for eval. Patient is ESRD on HD. Patient was in the middle of dialysis with about 45 minutes left when she became hypotensive with SBP in the 80s, responded to fluid bolus. She also developed substernal chest pressure at that time which did resolve. She also had persistent narrow complex tachycardia after CP. On arrival to ED patient's , she was in AF RVR, which converted to SR after brief period on cardizem gtt. Plan was possible discharge home however patient had positive troponin with increase in second troponin in ED. ED discussed case with nephrology, plan for admission for trending troponins. Admit to hospital medicine for further eval.     Overview/Hospital Course:  No notes on file    Interval History:  No acute events overnight.  Patient starting to improve.  Continue antibiotics.  We will ask PT and OT to see patient.    Review of Systems  Objective:     Vital Signs (Most Recent):  Temp: 99.3 °F (37.4 °C) (03/18/24 0718)  Pulse: 75 (03/18/24 0914)  Resp: 20 (03/18/24 0914)  BP: (!) 125/59 (03/18/24 0718)  SpO2: 97 % (03/18/24 0914) Vital Signs (24h Range):  Temp:  [97.8 °F (36.6 °C)-99.6 °F (37.6 °C)] 99.3 °F (37.4 °C)  Pulse:  [74-84] 75  Resp:  [16-20] 20  SpO2:  [95 %-99 %] 97 %  BP: ()/(39-59) 125/59     Weight: 54.5 kg (120 lb 2.4 oz)  Body mass index is 19.99 kg/m².    Intake/Output Summary (Last 24 hours) at 3/18/2024 1036  Last data filed at 3/18/2024 0610  Gross per 24 hour   Intake 350.05 ml   Output --   Net 350.05 ml         Physical Exam  Vitals reviewed.   Constitutional:       General: She is not in  acute distress.     Appearance: Normal appearance.   HENT:      Head: Normocephalic and atraumatic.      Right Ear: External ear normal.      Left Ear: External ear normal.      Nose: Nose normal.      Mouth/Throat:      Mouth: Mucous membranes are moist.      Pharynx: Oropharynx is clear.   Eyes:      Extraocular Movements: Extraocular movements intact.      Conjunctiva/sclera: Conjunctivae normal.   Cardiovascular:      Rate and Rhythm: Normal rate and regular rhythm.      Pulses: Normal pulses.      Heart sounds: Normal heart sounds. No murmur heard.     No gallop.   Pulmonary:      Effort: Pulmonary effort is normal. No respiratory distress.      Breath sounds: Normal breath sounds. No wheezing or rales.   Abdominal:      General: Abdomen is flat. There is no distension.      Palpations: Abdomen is soft.      Tenderness: There is no abdominal tenderness. There is no guarding.   Musculoskeletal:         General: No swelling. Normal range of motion.      Cervical back: Normal range of motion and neck supple.   Skin:     General: Skin is warm and dry.   Neurological:      General: No focal deficit present.      Mental Status: She is alert and oriented to person, place, and time.             Significant Labs: All pertinent labs within the past 24 hours have been reviewed.    Significant Imaging: I have reviewed all pertinent imaging results/findings within the past 24 hours.    Assessment/Plan:      * Chest pain  Elevated troponins x2 in ED with increase  Serial troponins -stable  Likely elevated secondary to ESRD.  Denies any further chest pain.      Fever  Likely secondary to pneumonia.  Repeat chest x-ray concerning for pneumonia.  Protocol ordered overnight was positive.  We will start Zosyn and vanc.  Monitor for improvement.  Blood culture sent, no growth to date    ESRD (end stage renal disease)  Creatine stable for now. BMP reviewed- noted Estimated Creatinine Clearance: 5.7 mL/min (A) (based on SCr of 6.4  mg/dL (H)). according to latest data. Based on current GFR, CKD stage is end stage.  Monitor UOP and serial BMP and adjust therapy as needed. Renally dose meds. Avoid nephrotoxic medications and procedures.  Nephrology following.  Monday Wednesday Friday dialysis.  States she did 4 hours of dialysis yesterday.    Hypotension  Resolved  Resume home midodrine  Monitor BP    Atrial fibrillation with RVR with history of known paroxysmal atrial fibrillation  AF RVR on arrival, converted to SR s/p cardizem  Patient with Paroxysmal (<7 days) atrial fibrillation which is controlled currently with  multaq . Patient is currently in sinus rhythm.[id number] Score: 4. Anticoagulation indicated. Anticoagulation done with eliquis .      VTE Risk Mitigation (From admission, onward)           Ordered     apixaban tablet 2.5 mg  2 times daily         03/15/24 1620     Reason for No Pharmacological VTE Prophylaxis  Once        Question:  Reasons:  Answer:  Already adequately anticoagulated on oral Anticoagulants    03/15/24 1617     IP VTE HIGH RISK PATIENT  Once         03/15/24 1617     Place sequential compression device  Until discontinued         03/15/24 1617                    Discharge Planning   ILAN: 3/20/2024     Code Status: Full Code   Is the patient medically ready for discharge?:     Reason for patient still in hospital (select all that apply): Treatment and PT / OT recommendations  Discharge Plan A: Home with family                  Denzel Ventura Jr, MD  Department of Hospital Medicine   [facility]

## 2024-03-18 NOTE — PLAN OF CARE
CM attempted to speak to patient at bedside regarding therapy recommendation for SNF at discharge.  Patient off the floor to dialysis. CM will follow up in the AM.

## 2024-03-18 NOTE — SUBJECTIVE & OBJECTIVE
Interval History:  No acute events overnight.  Patient starting to improve.  Continue antibiotics.  We will ask PT and OT to see patient.    Review of Systems  Objective:     Vital Signs (Most Recent):  Temp: 99.3 °F (37.4 °C) (03/18/24 0718)  Pulse: 75 (03/18/24 0914)  Resp: 20 (03/18/24 0914)  BP: (!) 125/59 (03/18/24 0718)  SpO2: 97 % (03/18/24 0914) Vital Signs (24h Range):  Temp:  [97.8 °F (36.6 °C)-99.6 °F (37.6 °C)] 99.3 °F (37.4 °C)  Pulse:  [74-84] 75  Resp:  [16-20] 20  SpO2:  [95 %-99 %] 97 %  BP: ()/(39-59) 125/59     Weight: 54.5 kg (120 lb 2.4 oz)  Body mass index is 19.99 kg/m².    Intake/Output Summary (Last 24 hours) at 3/18/2024 1036  Last data filed at 3/18/2024 0610  Gross per 24 hour   Intake 350.05 ml   Output --   Net 350.05 ml         Physical Exam  Vitals reviewed.   Constitutional:       General: She is not in acute distress.     Appearance: Normal appearance.   HENT:      Head: Normocephalic and atraumatic.      Right Ear: External ear normal.      Left Ear: External ear normal.      Nose: Nose normal.      Mouth/Throat:      Mouth: Mucous membranes are moist.      Pharynx: Oropharynx is clear.   Eyes:      Extraocular Movements: Extraocular movements intact.      Conjunctiva/sclera: Conjunctivae normal.   Cardiovascular:      Rate and Rhythm: Normal rate and regular rhythm.      Pulses: Normal pulses.      Heart sounds: Normal heart sounds. No murmur heard.     No gallop.   Pulmonary:      Effort: Pulmonary effort is normal. No respiratory distress.      Breath sounds: Normal breath sounds. No wheezing or rales.   Abdominal:      General: Abdomen is flat. There is no distension.      Palpations: Abdomen is soft.      Tenderness: There is no abdominal tenderness. There is no guarding.   Musculoskeletal:         General: No swelling. Normal range of motion.      Cervical back: Normal range of motion and neck supple.   Skin:     General: Skin is warm and dry.   Neurological:       General: No focal deficit present.      Mental Status: She is alert and oriented to person, place, and time.             Significant Labs: All pertinent labs within the past 24 hours have been reviewed.    Significant Imaging: I have reviewed all pertinent imaging results/findings within the past 24 hours.

## 2024-03-18 NOTE — PROGRESS NOTES
+375 net uf, pt. Became hypotensive, did not tolerate HD, Dr Yee notified, stated to stop tx   03/18/24 1530   Vital Signs   Temp 98 °F (36.7 °C)   Pulse 72   Resp 18   SpO2 96 %   BP (!) 109/51   Assessments (Pre/Post)   Safety vein preservation armband present   Date Hepatitis Profile Obtained 11/10/23   Blood Liters Processed (BLP) 32.3   Transport Modality bed   Level of Consciousness (AVPU) alert   Dialyzer Clearance moderately streaked   Pain   Preferred Pain Scale number (Numeric Rating Pain Scale)   Pain Rating (0-10): Rest 0        Hemodialysis AV Fistula Right upper arm   No Placement Date or Time found.   Present Prior to Hospital Arrival?: Yes  Location: Right upper arm   Site Assessment Clean;Dry;Intact   Patency Present;Thrill;Bruit   Status Deaccessed   Flows Good   Dressing Status Clean;Dry;Intact   Site Condition No complications   Dressing Gauze   Post-Hemodialysis Assessment   Rinseback Volume (mL) 250 mL   Blood Volume Processed (Liters) 32.3 L   Dialyzer Clearance Moderately streaked   Duration of Treatment 90 minutes   Additional Fluid Intake (mL) 1300 mL   Total UF (mL) 925 mL   Net Fluid Removal 375   Patient Response to Treatment dec b/p ,not tolerating   Post-Treatment Weight 54.4 kg (119 lb 14.9 oz)   Treatment Weight Change -0.1   Post-Hemodialysis Comments tx stopped     Educated on fluid intake

## 2024-03-18 NOTE — PT/OT/SLP EVAL
Physical Therapy Evaluation    Patient Name:  Tyra Isaac   MRN:  [id number]    Recommendations:     Discharge Recommendations: Moderate Intensity Therapy   Discharge Equipment Recommendations: none   Barriers to discharge:  increase assist with mobility, high fall risk,  decrease activity tolerance    Assessment:     Tyra Isaac is a 83 y.o. female admitted with a medical diagnosis of Chest pain.  She presents with the following impairments/functional limitations: weakness, impaired endurance, impaired self care skills, impaired functional mobility, gait instability, impaired balance, decreased lower extremity function, decreased safety awareness, pain, impaired cardiopulmonary response to activity.    Pt found in bed with HOB elevated. Pt agreeable to visit. Pt reports feeling better but continue to be weak and experience loss stool. Pt requested assist with hygiene care. CGA for static standing at RW and max A for optimal hygiene. Pt required min A with short distance ambulation to chair with RW with decrease foot clearance, stride length and gait speed.     Rehab Prognosis: Fair; patient would benefit from acute skilled PT services to address these deficits and reach maximum level of function.    Recent Surgery: * No surgery found *      Plan:     During this hospitalization, patient to be seen 6 x/week to address the identified rehab impairments via gait training, therapeutic activities, therapeutic exercises, neuromuscular re-education and progress toward the following goals:    Plan of Care Expires:  04/18/24    Subjective     Chief Complaint: fatigue and weakness  Patient/Family Comments/goals: get stronger  Pain/Comfort:  Pain Rating 1: 0/10    Patients cultural, spiritual, [employer]ism conflicts given the current situation: no    Living Environment:  Pt lives with her daughter and son in law in a one story home with no KEY. Son assist with transportation to dialysis TRS.   Prior to admission,  patients level of function was MI Rollator.  Equipment used at home: rollator, wheelchair.  DME owned (not currently used): none.  Upon discharge, patient will have assistance from family.    Objective:     Communicated with RN prior to session.  Patient found HOB elevated with peripheral IV, telemetry, oxygen  upon PT entry to room.    General Precautions: Standard, fall  Orthopedic Precautions:N/A   Braces: N/A  Respiratory Status: Nasal cannula, flow .5 L/min    Exams:  RLE ROM: WFL  RLE Strength: 4-/5 throughout  LLE ROM: WFL  LLE Strength: 4-/5 throughout    Functional Mobility:  Bed Mobility:     Supine to Sit: minimum assistance  Transfers:     Sit to Stand:  minimum assistance with rolling walker  Gait: 5 ft with RW and min A with gait deficits as stated above      AM-PAC 6 CLICK MOBILITY  Total Score:14       Treatment & Education:  Pt educated on POC, discharge recommendation, importance of time OOB, RW management, need for assist with mobility, use of call bell to seek assistance as needed and fall prevention      Patient left up in chair with all lines intact, call button in reach, chair alarm on, and extender present.    GOALS:   Multidisciplinary Problems       Physical Therapy Goals          Problem: Physical Therapy    Goal Priority Disciplines Outcome Goal Variances Interventions   Physical Therapy Goal     PT, PT/OT Ongoing, Progressing     Description: Goals to be met by: 24     Patient will increase functional independence with mobility by performin. Supine to sit with Supervision  2. Sit to stand transfer with Supervision  3. Bed to chair transfer with Supervision using Rolling Walker  4. Gait  x 150 feet with Supervision using Rolling Walker.                              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    Osteopenia of multiple sites 3/9/2018    Based upon bone density measurements. Patient also has chronic kidney disease.    Stroke 11/2016       Past Surgical History:   Procedure Laterality Date    ANGIOGRAM, CORONARY, WITH LEFT HEART CATHETERIZATION N/A 2/7/2022    Procedure: Angiogram, Coronary, with Left Heart Cath;  Surgeon: Gino Leal MD;  Location: [facility] CATH/EP LAB;  Service: Cardiology;  Laterality: N/A;    CARDIAC SURGERY      stents    EYE SURGERY      WRIST SURGERY         Time Tracking:     PT Received On: 03/18/24  PT Start Time: 1100     PT Stop Time: 1118  PT Total Time (min): 18 min     Billable Minutes: Evaluation 8 and Therapeutic Activity 10      03/18/2024

## 2024-03-19 VITALS
HEIGHT: 65 IN | BODY MASS INDEX: 20.01 KG/M2 | WEIGHT: 120.13 LBS | OXYGEN SATURATION: 98 % | TEMPERATURE: 97 F | HEART RATE: 74 BPM | RESPIRATION RATE: 16 BRPM | SYSTOLIC BLOOD PRESSURE: 112 MMHG | DIASTOLIC BLOOD PRESSURE: 56 MMHG

## 2024-03-19 LAB
ALBUMIN SERPL BCP-MCNC: 2.8 G/DL (ref 3.5–5.2)
ALP SERPL-CCNC: 43 U/L (ref 55–135)
ALT SERPL W/O P-5'-P-CCNC: 9 U/L (ref 10–44)
ANION GAP SERPL CALC-SCNC: 12 MMOL/L (ref 8–16)
AST SERPL-CCNC: 14 U/L (ref 10–40)
BASOPHILS # BLD AUTO: 0.03 K/UL (ref 0–0.2)
BASOPHILS NFR BLD: 0.5 % (ref 0–1.9)
BILIRUB SERPL-MCNC: 0.4 MG/DL (ref 0.1–1)
BUN SERPL-MCNC: 55 MG/DL (ref 8–23)
CALCIUM SERPL-MCNC: 8.8 MG/DL (ref 8.7–10.5)
CHLORIDE SERPL-SCNC: 100 MMOL/L (ref 95–110)
CO2 SERPL-SCNC: 23 MMOL/L (ref 23–29)
CREAT SERPL-MCNC: 8.1 MG/DL (ref 0.5–1.4)
DIFFERENTIAL METHOD BLD: ABNORMAL
EOSINOPHIL # BLD AUTO: 0.6 K/UL (ref 0–0.5)
EOSINOPHIL NFR BLD: 9 % (ref 0–8)
ERYTHROCYTE [DISTWIDTH] IN BLOOD BY AUTOMATED COUNT: 14.6 % (ref 11.5–14.5)
EST. GFR  (NO RACE VARIABLE): 4.5 ML/MIN/1.73 M^2
GLUCOSE SERPL-MCNC: 150 MG/DL (ref 70–110)
GLUCOSE SERPL-MCNC: 76 MG/DL (ref 70–110)
HCT VFR BLD AUTO: 27.1 % (ref 37–48.5)
HGB BLD-MCNC: 8.3 G/DL (ref 12–16)
IMM GRANULOCYTES # BLD AUTO: 0.02 K/UL (ref 0–0.04)
IMM GRANULOCYTES NFR BLD AUTO: 0.3 % (ref 0–0.5)
LYMPHOCYTES # BLD AUTO: 0.8 K/UL (ref 1–4.8)
LYMPHOCYTES NFR BLD: 11.8 % (ref 18–48)
MAGNESIUM SERPL-MCNC: 2.2 MG/DL (ref 1.6–2.6)
MCH RBC QN AUTO: 29.1 PG (ref 27–31)
MCHC RBC AUTO-ENTMCNC: 30.6 G/DL (ref 32–36)
MCV RBC AUTO: 95 FL (ref 82–98)
MONOCYTES # BLD AUTO: 1.3 K/UL (ref 0.3–1)
MONOCYTES NFR BLD: 19 % (ref 4–15)
NEUTROPHILS # BLD AUTO: 3.9 K/UL (ref 1.8–7.7)
NEUTROPHILS NFR BLD: 59.4 % (ref 38–73)
NRBC BLD-RTO: 0 /100 WBC
PLATELET # BLD AUTO: 321 K/UL (ref 150–450)
PMV BLD AUTO: 10 FL (ref 9.2–12.9)
POTASSIUM SERPL-SCNC: 4.9 MMOL/L (ref 3.5–5.1)
PROT SERPL-MCNC: 6.7 G/DL (ref 6–8.4)
RBC # BLD AUTO: 2.85 M/UL (ref 4–5.4)
SODIUM SERPL-SCNC: 135 MMOL/L (ref 136–145)
WBC # BLD AUTO: 6.63 K/UL (ref 3.9–12.7)

## 2024-03-19 PROCEDURE — 99900031 HC PATIENT EDUCATION (STAT)

## 2024-03-19 PROCEDURE — 94761 N-INVAS EAR/PLS OXIMETRY MLT: CPT

## 2024-03-19 PROCEDURE — 97535 SELF CARE MNGMENT TRAINING: CPT

## 2024-03-19 PROCEDURE — 1111F DSCHRG MED/CURRENT MED MERGE: CPT | Mod: CPTII,,, | Performed by: INTERNAL MEDICINE

## 2024-03-19 PROCEDURE — 63600175 PHARM REV CODE 636 W HCPCS: Performed by: INTERNAL MEDICINE

## 2024-03-19 PROCEDURE — 27000221 HC OXYGEN, UP TO 24 HOURS

## 2024-03-19 PROCEDURE — 25000003 PHARM REV CODE 250: Performed by: INTERNAL MEDICINE

## 2024-03-19 PROCEDURE — 25000003 PHARM REV CODE 250

## 2024-03-19 PROCEDURE — 97165 OT EVAL LOW COMPLEX 30 MIN: CPT

## 2024-03-19 PROCEDURE — 85025 COMPLETE CBC W/AUTO DIFF WBC: CPT

## 2024-03-19 PROCEDURE — 80053 COMPREHEN METABOLIC PANEL: CPT

## 2024-03-19 PROCEDURE — 36415 COLL VENOUS BLD VENIPUNCTURE: CPT

## 2024-03-19 PROCEDURE — 83735 ASSAY OF MAGNESIUM: CPT

## 2024-03-19 PROCEDURE — 97116 GAIT TRAINING THERAPY: CPT

## 2024-03-19 PROCEDURE — 99239 HOSP IP/OBS DSCHRG MGMT >30: CPT | Mod: ,,, | Performed by: INTERNAL MEDICINE

## 2024-03-19 RX ORDER — MUPIROCIN 20 MG/G
OINTMENT TOPICAL 2 TIMES DAILY
Status: DISCONTINUED | OUTPATIENT
Start: 2024-03-19 | End: 2024-03-19 | Stop reason: HOSPADM

## 2024-03-19 RX ORDER — LEVOFLOXACIN 500 MG/1
500 TABLET, FILM COATED ORAL ONCE
Status: COMPLETED | OUTPATIENT
Start: 2024-03-19 | End: 2024-03-19

## 2024-03-19 RX ORDER — LEVOFLOXACIN 250 MG/1
250 TABLET ORAL
Qty: 2 TABLET | Refills: 0 | Status: SHIPPED | OUTPATIENT
Start: 2024-03-19 | End: 2024-06-06

## 2024-03-19 RX ADMIN — Medication 1 TABLET: at 09:03

## 2024-03-19 RX ADMIN — APIXABAN 2.5 MG: 2.5 TABLET, FILM COATED ORAL at 09:03

## 2024-03-19 RX ADMIN — DRONEDARONE 400 MG: 400 TABLET, FILM COATED ORAL at 09:03

## 2024-03-19 RX ADMIN — CALCIUM ACETATE 667 MG: 667 CAPSULE ORAL at 09:03

## 2024-03-19 RX ADMIN — PIPERACILLIN SODIUM AND TAZOBACTAM SODIUM 3.38 G: 3; .375 INJECTION, POWDER, LYOPHILIZED, FOR SOLUTION INTRAVENOUS at 03:03

## 2024-03-19 RX ADMIN — ATORVASTATIN CALCIUM 40 MG: 40 TABLET, FILM COATED ORAL at 09:03

## 2024-03-19 RX ADMIN — CALCIUM ACETATE 667 MG: 667 CAPSULE ORAL at 11:03

## 2024-03-19 RX ADMIN — ACETAMINOPHEN 650 MG: 325 TABLET ORAL at 03:03

## 2024-03-19 RX ADMIN — MIDODRINE HYDROCHLORIDE 10 MG: 10 TABLET ORAL at 11:03

## 2024-03-19 RX ADMIN — MIDODRINE HYDROCHLORIDE 10 MG: 10 TABLET ORAL at 06:03

## 2024-03-19 RX ADMIN — DORZOLAMIDE HYDROCHLORIDE AND TIMOLOL MALEATE 1 DROP: 22.3; 6.8 SOLUTION/ DROPS OPHTHALMIC at 09:03

## 2024-03-19 RX ADMIN — LEVOFLOXACIN 500 MG: 500 TABLET, FILM COATED ORAL at 11:03

## 2024-03-19 NOTE — HOSPITAL COURSE
Patient presents to the emergency room for chest pain.  She also had fever of unknown origin during her stay.  She had chest x-ray which showed possible.  She was started on antibiotics improved.  Patient was sent home Levaquin.  Her D-dimer was slightly elevated had a V/Q scan that was intermediate risk.  Determined to not treat her for PE at this time.  We will consider repeating studies and once patient's pneumonia is cleared.  Patient will need to follow up with her PCP within the next week.  They can discuss further investigation of possible PE.

## 2024-03-19 NOTE — PT/OT/SLP EVAL
Occupational Therapy   Evaluation    Name: Tyra Isaac  MRN: [id number]  Admitting Diagnosis: Chest pain  Recent Surgery: * No surgery found *      Recommendations:     Discharge Recommendations: Moderate Intensity Therapy  Discharge Equipment Recommendations:  none  Barriers to discharge:  None    Assessment:     Tyra Isaac is a 83 y.o. female with a medical diagnosis of Chest pain.   Performance deficits affecting function: weakness, impaired endurance, impaired self care skills, impaired functional mobility, gait instability, impaired balance, decreased lower extremity function, decreased safety awareness, impaired cardiopulmonary response to activity.      Rehab Prognosis: Good; patient would benefit from acute skilled OT services to address these deficits and reach maximum level of function.       Plan:     Patient to be seen 3 x/week to address the above listed problems via self-care/home management, therapeutic activities, therapeutic exercises  Plan of Care Expires: 04/16/24  Plan of Care Reviewed with: patient    Subjective     Chief Complaint: none  Patient/Family Comments/goals: none    Occupational Profile:  Living Environment: Patient lives with son and daughter-in-law in a [facility].   Previous level of function: Patient was independent with ADLs and ambulatory with rollator.   Equipment Used at Home: wheelchair, rollator, bedside commode  Assistance upon Discharge: Patient will require assistance from family.     Pain/Comfort:  Pain Rating 1: 0/10  Pain Rating Post-Intervention 1: 0/10    Patients cultural, spiritual, Roman Catholic conflicts given the current situation: no    Objective:     Communicated with: nurse Vincent prior to session.  Patient found HOB elevated with telemetry upon OT entry to room.    General Precautions: Standard, fall  Orthopedic Precautions: N/A  Braces: N/A  Respiratory Status: Room air    Occupational Performance:    Bed Mobility:    Patient completed Scooting/Bridging  with stand by assistance  Patient completed Supine to Sit with stand by assistance    Functional Mobility/Transfers:  Patient completed Sit <> Stand Transfer with contact guard assistance  with  rolling walker   Patient completed Bed <> Chair Transfer using Stand Pivot technique with contact guard assistance with rolling walker  Patient completed Toilet Transfer Stand Pivot technique with contact guard assistance with  rolling walker    Activities of Daily Living:  Grooming: stand by assistance with oral and facial hygiene while standing at sink  Lower Body Dressing: stand by assistance to don/doff socks in long sitting in bed    Cognitive/Visual Perceptual:  Cognitive/Psychosocial Skills:     -       Oriented to: x4   -       Follows Commands/attention:Follows multistep  commands  -       Communication: clear/fluent  -       Safety awareness/insight to disability: intact   -       Mood/Affect/Coping skills/emotional control: Appropriate to situation and Cooperative  Visual/Perceptual:      -Intact     Physical Exam:  Postural examination/scapula alignment:    -       Rounded shoulders  -       Forward head  Upper Extremity Range of Motion:     -       Right Upper Extremity: WFL  -       Left Upper Extremity: WFL  Upper Extremity Strength:    -       Right Upper Extremity: WFL  -       Left Upper Extremity: WFL   Strength:    -       Right Upper Extremity: WFL  -       Left Upper Extremity: WFL  Fine Motor Coordination:    -       Intact  Gross motor coordination:   WFL    AMPAC 6 Click ADL:  AMPAC Total Score: 20    Treatment & Education:  OT ed pt on OT role & POC as well as discharge recommendations.  OT ed patient on safety with walker use for functional mobility with cues for hand placement & sequencing.       Patient left up in chair with all lines intact, call button in reach, and chair alarm on    GOALS:   Multidisciplinary Problems       Occupational Therapy Goals          Problem: Occupational Therapy     Goal Priority Disciplines Outcome Interventions   Occupational Therapy Goal     OT, PT/OT Ongoing, Progressing    Description: Goals to be met by: 4/16/2024     Patient will increase functional independence with ADLs by performing:    LE Dressing with Modified [place].  Grooming while standing at sink with Modified [place].  Toileting from toilet with Modified [place] for hygiene and clothing management.   Supine to sit with Modified [place].  Toilet transfer to toilet with Modified [place].                         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    Osteopenia of multiple sites 3/9/2018    Based upon bone density measurements. Patient also has chronic kidney disease.    Stroke 11/2016         Past Surgical History:   Procedure Laterality Date    ANGIOGRAM, CORONARY, WITH LEFT HEART CATHETERIZATION N/A 2/7/2022    Procedure: Angiogram, Coronary, with Left Heart Cath;  Surgeon: Gino Leal MD;  Location: [facility] CATH/EP LAB;  Service: Cardiology;  Laterality: N/A;    CARDIAC SURGERY      stents    EYE SURGERY      WRIST SURGERY         Time Tracking:     OT Date of Treatment: 03/19/24  OT Start Time: 0926  OT Stop Time: 0954  OT Total Time (min): 28 min    Billable Minutes:Evaluation 5  Self Care/Home Management 23    3/19/2024

## 2024-03-19 NOTE — PT/OT/SLP PROGRESS
Physical Therapy Treatment    Patient Name:  Tyra Isaac   MRN:  [id number]    Recommendations:     Discharge Recommendations: Low Intensity Therapy  Discharge Equipment Recommendations: none  Barriers to discharge:  increase assist with mobility, high fall risk,  decrease activity tolerance    Assessment:     Tyra Isaac is a 83 y.o. female admitted with a medical diagnosis of Chest pain.  She presents with the following impairments/functional limitations: weakness, impaired endurance, impaired self care skills, impaired functional mobility, gait instability, impaired balance, decreased lower extremity function, decreased safety awareness, pain, impaired cardiopulmonary response to activity.    Pt found up in chair. Pt agreeable to visit. Pt presents with significant improvement with mobility this date. Pt performed sit to stand with CGA to RW after VI for HP. Pt ambulated 125 ft with RW and CGA no loss of balance or shortness of breath.     Rehab Prognosis: Fair; patient would benefit from acute skilled PT services to address these deficits and reach maximum level of function.    Recent Surgery: * No surgery found *      Plan:     During this hospitalization, patient to be seen 5 x/week to address the identified rehab impairments via gait training, therapeutic activities, therapeutic exercises, neuromuscular re-education and progress toward the following goals:    Plan of Care Expires:  04/19/24    Subjective     Chief Complaint: eager to go home  Patient/Family Comments/goals: go home  Pain/Comfort:  Pain Rating 1: 0/10      Objective:     Communicated with RN prior to session.  Patient found up in chair with peripheral IV, telemetry upon PT entry to room.     General Precautions: Standard, fall  Orthopedic Precautions: N/A  Braces: N/A  Respiratory Status: Room air     Functional Mobility:  Transfers:     Sit to Stand:  contact guard assistance with rolling walker  Gait: 125 ft with RW and  CGA      AM-PAC 6 CLICK MOBILITY  Turning over in bed (including adjusting bedclothes, sheets and blankets)?: 3  Sitting down on and standing up from a chair with arms (e.g., wheelchair, bedside commode, etc.): 3  Moving from lying on back to sitting on the side of the bed?: 3  Moving to and from a bed to a chair (including a wheelchair)?: 3  Need to walk in hospital room?: 3  Climbing 3-5 steps with a railing?: 3  Basic Mobility Total Score: 18       Treatment & Education:  Pt educated on POC, discharge recommendation, HP with transfers, importance of time OOB, pacing/energy conservation, need for assist with mobility, use of call bell to seek assistance as needed and fall prevention      Patient left up in chair with all lines intact, call button in reach, and chair alarm on..    GOALS:   Multidisciplinary Problems       Physical Therapy Goals          Problem: Physical Therapy    Goal Priority Disciplines Outcome Goal Variances Interventions   Physical Therapy Goal     PT, PT/OT Ongoing, Progressing     Description: Goals to be met by: 24     Patient will increase functional independence with mobility by performin. Supine to sit with Supervision  2. Sit to stand transfer with Supervision  3. Bed to chair transfer with Supervision using Rolling Walker  4. Gait  x 150 feet with Supervision using Rolling Walker.                              Time Tracking:     PT Received On: 24  PT Start Time: 1000     PT Stop Time: 1013  PT Total Time (min): 13 min     Billable Minutes: Gait Training 13    Treatment Type: Treatment  PT/PTA: PT     Number of PTA visits since last PT visit: 0     2024

## 2024-03-19 NOTE — PLAN OF CARE
DC orders and chart reviewed. No discharge needs noted.  Patient cleared for discharge from .    Patient is discharging home with [facility] services.  Start of care will be tomorrow, 3/20.  FU appointment information added to AVS.        03/19/24 1131   Final Note   Assessment Type Final Discharge Note   Anticipated Discharge Disposition Home-Health   What phone number can be called within the next 1-3 days to see how you are doing after discharge? [phone]   Hospital Resources/Appts/Education Provided Appointments scheduled and added to AVS;Post-Acute resouces added to AVS   Post-Acute Status   Post-Acute Authorization Home Health   Home Health Status Set-up Complete/Auth obtained   Patient choice form signed by patient/caregiver List with quality metrics by geographic area provided   Discharge Delays None known at this time

## 2024-03-19 NOTE — DISCHARGE SUMMARY
[facility] Medicine  Discharge Summary      Patient Name: Tyra Isaac  MRN: [id number]  ROMMEL: [id number]  Patient Class: IP- Inpatient  Admission Date: 3/15/2024  Hospital Length of Stay: 2 days  Discharge Date and Time:  03/19/2024 12:17 PM  Attending Physician: Denzel Ventura Jr., MD   Discharging Provider: Denzel Ventura Jr, MD  Primary Care Provider: Kalpesh Goel MD    Primary Care Team: Networked reference to record PCT     HPI:   83-year-old female presented to ED for eval. Patient is ESRD on HD. Patient was in the middle of dialysis with about 45 minutes left when she became hypotensive with SBP in the 80s, responded to fluid bolus. She also developed substernal chest pressure at that time which did resolve. She also had persistent narrow complex tachycardia after CP. On arrival to ED patient's , she was in AF RVR, which converted to SR after brief period on cardizem gtt. Plan was possible discharge home however patient had positive troponin with increase in second troponin in ED. ED discussed case with nephrology, plan for admission for trending troponins. Admit to hospital medicine for further eval.     * No surgery found *      Hospital Course:   Patient presents to the emergency room for chest pain.  She also had fever of unknown origin during her stay.  She had chest x-ray which showed possible.  She was started on antibiotics improved.  Patient was sent home Levaquin.  Her D-dimer was slightly elevated had a V/Q scan that was intermediate risk.  Determined to not treat her for PE at this time.  We will consider repeating studies and once patient's pneumonia is cleared.  Patient will need to follow up with her PCP within the next week.  They can discuss further investigation of possible PE.     Goals of Care Treatment Preferences:  Code Status: Full Code      Consults:   Consults (From admission, onward)          Status Ordering Provider     Pharmacy to dose  Vancomycin consult  Once        Provider:  (Not yet assigned)   See [facility] for full Linked Orders Report.    Acknowledged ZARINA FALK JR     Inpatient consult to Nephrology  Once        Provider:  Rose Maynard MD    Completed CHANO ARNOLD            No new Assessment & Plan notes have been filed under this hospital service since the last note was generated.  Service: Hospital Medicine    Final Active Diagnoses:    Diagnosis Date Noted POA    PRINCIPAL PROBLEM:  Chest pain [R07.9] 01/26/2022 Yes    Fever [R50.9] 03/16/2024 Unknown    ESRD (end stage renal disease) [N18.6] 12/15/2023 Yes    Hypotension [I95.9] 02/24/2023 Yes    Atrial fibrillation with RVR with history of known paroxysmal atrial fibrillation [I48.91] 12/13/2019 Yes      Problems Resolved During this Admission:       Discharged Condition: good    Disposition: Home-Health Care Svc    Follow Up:   Follow-up Information       Pt, [facility] - Follow up.    Specialty: Home Health Services  Contact information:  [phone]               Kalpesh Goel MD. Go in 1 week(s).    Specialty: Internal Medicine  Why: at 11:20 am.  Contact information:  [address] [place] [address]  [address] [place] [id number]  [phone]                           Patient Instructions:      Ambulatory referral/consult to Home Health   Standing Status: Future   Referral Priority: Routine Referral Type: Home Health   Referral Reason: Specialty Services Required   Requested Specialty: Home Health Services   Number of Visits Requested: 1       Significant Diagnostic Studies: Labs: CMP   Recent Labs   Lab 03/18/24  0436 03/19/24  0502   * 135*   K 5.2* 4.9   CL 94* 100   CO2 29 23   GLU 78 150*   BUN 70* 55*   CREATININE 9.5* 8.1*   CALCIUM 8.8 8.8   PROT 6.9 6.7   ALBUMIN 2.9* 2.8*   BILITOT 0.4 0.4   ALKPHOS 48* 43*   AST 12 14   ALT 8* 9*   ANIONGAP 12 12    and CBC   Recent Labs   Lab 03/18/24  0436 03/19/24  0502   WBC 9.19 6.63   HGB 8.8* 8.3*   HCT 28.4*  27.1*    321       Pending Diagnostic Studies:       None           Medications:  Reconciled Home Medications:      Medication List        START taking these medications      levoFLOXacin 250 MG tablet  Commonly known as: LEVAQUIN  Take 1 tablet (250 mg total) by mouth every 48 hours.            CONTINUE taking these medications      albuterol 90 mcg/actuation inhaler  Commonly known as: VENTOLIN HFA  Inhale 2 puffs into the lungs every 6 (six) hours as needed for Wheezing. Rescue     atorvastatin 40 MG tablet  Commonly known as: LIPITOR  Take 40 mg by mouth once daily.     b complex vitamins tablet  Take 1 tablet by mouth once daily.     calcium acetate(phosphat bind) 667 mg capsule  Commonly known as: PHOSLO  Take 667 mg by mouth 3 (three) times daily with meals.     dorzolamide-timolol 2-0.5% 22.3-6.8 mg/mL ophthalmic solution  Commonly known as: COSOPT  Place 1 drop into both eyes 2 (two) times daily.     dronedarone 400 mg Tab  Commonly known as: MULTAQ  Take 1 tablet (400 mg total) by mouth 2 (two) times daily with meals.     ELIQUIS 2.5 mg Tab  Generic drug: apixaban  Take 2.5 mg by mouth 2 (two) times daily.     latanoprost 0.005 % ophthalmic solution  Place 1 drop into both eyes every evening.     loperamide 2 mg Tab  Commonly known as: IMODIUM A-D  Take 1 tablet (2 mg total) by mouth 4 (four) times daily as needed (diarrhea).     midodrine 10 MG tablet  Commonly known as: PROAMATINE  Take 1 tablet (10 mg total) by mouth 3 (three) times daily with meals.     ondansetron 4 MG Tbdl  Commonly known as: ZOFRAN-ODT  Take 1 tablet (4 mg total) by mouth every 6 (six) hours as needed (nausea).              Indwelling Lines/Drains at time of discharge:   Lines/Drains/Airways       Drain  Duration                  Hemodialysis AV Fistula Right upper arm -- days                    Time spent on the discharge of patient: 40 minutes         Denzel Ventura Jr, MD  Department of Hospital Medicine  [facility]  [facility]

## 2024-03-19 NOTE — PLAN OF CARE
Problem: Adult Inpatient Plan of Care  Goal: Plan of Care Review  Outcome: Ongoing, Progressing  Goal: Patient-Specific Goal (Individualized)  Outcome: Ongoing, Progressing  Goal: Absence of Hospital-Acquired Illness or Injury  Outcome: Ongoing, Progressing  Goal: Optimal Comfort and Wellbeing  Outcome: Ongoing, Progressing  Goal: Readiness for Transition of Care  Outcome: Ongoing, Progressing     Problem: Skin Injury Risk Increased  Goal: Skin Health and Integrity  Outcome: Ongoing, Progressing     Problem: Fall Injury Risk  Goal: Absence of Fall and Fall-Related Injury  Outcome: Ongoing, Progressing     Problem: Nausea and Vomiting  Goal: Fluid and Electrolyte Balance  Outcome: Ongoing, Progressing     Problem: Infection  Goal: Absence of Infection Signs and Symptoms  Outcome: Ongoing, Progressing     Problem: Device-Related Complication Risk (Hemodialysis)  Goal: Safe, Effective Therapy Delivery  Outcome: Ongoing, Progressing     Problem: Hemodynamic Instability (Hemodialysis)  Goal: Effective Tissue Perfusion  Outcome: Ongoing, Progressing     Problem: Infection (Hemodialysis)  Goal: Absence of Infection Signs and Symptoms  Outcome: Ongoing, Progressing

## 2024-03-19 NOTE — PLAN OF CARE
Problem: Occupational Therapy  Goal: Occupational Therapy Goal  Description: Goals to be met by: 4/16/2024     Patient will increase functional independence with ADLs by performing:    LE Dressing with Modified [place].  Grooming while standing at sink with Modified [place].  Toileting from toilet with Modified [place] for hygiene and clothing management.   Supine to sit with Modified [place].  Toilet transfer to toilet with Modified [place].    3/19/2024 1234 by Chuy Varma, OT  Outcome: Ongoing, Progressing

## 2024-03-19 NOTE — CARE UPDATE
03/19/24 0730   Patient Assessment/Suction   Level of Consciousness (AVPU) alert   Expansion/Accessory Muscles/Retractions no use of accessory muscles   All Lung Fields Breath Sounds diminished;clear   Rhythm/Pattern, Respiratory unlabored   Cough Frequency no cough   PRE-TX-O2   Device (Oxygen Therapy) room air   $ Is the patient on Low Flow Oxygen? Yes   SpO2 97 %   Pulse Oximetry Type Intermittent   $ Pulse Oximetry - Multiple Charge Pulse Oximetry - Multiple   Pulse 66   Resp 16   Temp 98.6 °F (37 °C)   BP (!) 147/67   Education   $ Education Bronchodilator;15 min

## 2024-03-19 NOTE — PROGRESS NOTES
INPATIENT NEPHROLOGY Progress Note  [facility]    Patient Name: Tyra Isaac  Date: 03/19/2024    Reason for consultation: ESRD    Chief Complaint:   Chief Complaint   Patient presents with    Tachycardia     SVT at HD.         History of Present Illness:  83-year-old female presented to ED for eval. Patient is ESRD on HD. Patient was in the middle of dialysis with about 45 minutes left when she became hypotensive with SBP in the 80s, responded to fluid bolus. She also developed substernal chest pressure at that time which did resolve. She also had persistent narrow complex tachycardia after CP. On arrival to ED patient's , she was in AF RVR, which converted to SR after brief period on cardizem gtt. Plan was possible discharge home however patient had positive troponin with increase in second troponin in ED. Consulted for dialysis.     Interval History:  3/16- trop stabilized on last 3 checks- off cardizem- c/o constipation  3/17- febrile, BP stable, on 2L NC, trop rising, 3/17 blood cx neg thus far- ordered CXR and KUB- patient urinated but she said nurse didn't take sample for collection (UA,UCx ordered)- she is quite ill appearing today  3/18 VSS. Seen on HD today.  3/19 VSS. Had to terminate HD yesterday due to hypotension. If stable today, can be dc home and follow with outpatient HD tomorrow - alternatively will evaluate her for HD needs in AM.    Plan of Care:    ESRD on HD MWF  AF with RVR  Chronic hypotension (with intradialytic exacerbations)  Elevated troponin  Secondary HPT  Anemia of CKD  Hyperkalemia  Hyponatremia  Fever    Plan:    - continue HD MWF  - back in NSR - continue multaq and eliquis  - continue midodrine  - trop rising again- not sure if fever related- ordered CXR- consider cards consult  - continue binder with meals  - ordered BRAYAN with HD  - blood cx neg thus far- awaiting UA/UCx- ordered CXR given increased O2 requirement- check KUB given constipation  -  continue renal diet, Lokelma if she has more persistent hyperkalemia, optimize bowel regimen    Update:   CXR- Faint reticular nodular densities, asymmetrically greater on the left, concerning for infectious/inflammatory process in the setting of fever (potentially aspiration).   KUB- Moderate gas and fecal material in the rectum, potentially related to constipation.  - started antbx for PNA  - started bowel regimen    Thank you for allowing us to participate in this patient's care. We will continue to follow.    Vital Signs:  Temp Readings from Last 3 Encounters:   03/19/24 98.6 °F (37 °C) (Oral)   01/08/24 98.8 °F (37.1 °C)   12/21/23 98.6 °F (37 °C) (Oral)       Pulse Readings from Last 3 Encounters:   03/19/24 66   01/11/24 109   01/08/24 85       BP Readings from Last 3 Encounters:   03/19/24 (!) 147/67   01/11/24 (!) 87/53   01/08/24 (!) 126/50       Weight:  Wt Readings from Last 3 Encounters:   03/15/24 54.5 kg (120 lb 2.4 oz)   01/11/24 44 kg (97 lb)   01/04/24 49.9 kg (110 lb)         Medications:  Scheduled Meds:   apixaban  2.5 mg Oral BID    atorvastatin  40 mg Oral Daily    calcium acetate(phosphat bind)  667 mg Oral TID WM    dorzolamide-timolol 2-0.5%  1 drop Both Eyes BID    dronedarone  400 mg Oral BID WM    epoetin rosy-epbx  100 Units/kg Subcutaneous Every Mon, Wed, Fri    latanoprost  1 drop Both Eyes QHS    midodrine  10 mg Oral TID    piperacillin-tazobactam (Zosyn) IV (PEDS and ADULTS) (extended infusion is not appropriate)  3.375 g Intravenous Q12H    polyethylene glycol  17 g Oral Daily    vit b cmplx 3-fa-vit c-biotin 1- mg-mg-mcg  1 tablet Oral Daily     Continuous Infusions:      PRN Meds:.acetaminophen, albuterol-ipratropium, aluminum-magnesium hydroxide-simethicone, melatonin, naloxone, nitroGLYCERIN, ondansetron, senna-docusate 8.6-50 mg, sodium chloride 0.9%, Pharmacy to dose Vancomycin consult **AND** vancomycin - pharmacy to dose  No current facility-administered medications  on file prior to encounter.     Current Outpatient Medications on File Prior to Encounter   Medication Sig Dispense Refill    atorvastatin (LIPITOR) 40 MG tablet Take 40 mg by mouth once daily.      b complex vitamins tablet Take 1 tablet by mouth once daily.      calcium acetate,phosphat bind, (PHOSLO) 667 mg capsule Take 667 mg by mouth 3 (three) times daily with meals.      dorzolamide-timolol 2-0.5% (COSOPT) 22.3-6.8 mg/mL ophthalmic solution Place 1 drop into both eyes 2 (two) times daily.      dronedarone (MULTAQ) 400 mg Tab Take 1 tablet (400 mg total) by mouth 2 (two) times daily with meals. 180 tablet 0    ELIQUIS 2.5 mg Tab Take 2.5 mg by mouth 2 (two) times daily.      latanoprost 0.005 % ophthalmic solution Place 1 drop into both eyes every evening.      loperamide (IMODIUM A-D) 2 mg Tab Take 1 tablet (2 mg total) by mouth 4 (four) times daily as needed (diarrhea). 3 tablet 0    midodrine (PROAMATINE) 10 MG tablet Take 1 tablet (10 mg total) by mouth 3 (three) times daily with meals. 270 tablet 0    ondansetron (ZOFRAN-ODT) 4 MG TbDL Take 1 tablet (4 mg total) by mouth every 6 (six) hours as needed (nausea). 30 tablet 5    albuterol (VENTOLIN HFA) 90 mcg/actuation inhaler Inhale 2 puffs into the lungs every 6 (six) hours as needed for Wheezing. Rescue 8 g 2       Review of Systems:  Neg    Physical Exam:  General Appearance:    Ill appearing   Head:    Normocephalic, atraumatic   Eyes:    PER, EOMI, and conjunctiva/sclera clear bilaterally       Mouth:   Moist mucus membranes, no thrush or oral lesions,       normal dentition   Back:     No CVA tenderness   Lungs:     Coarse   Chest wall:    No tenderness or deformity   Heart:    Regular rate and rhythm, S1 and S2 normal, no murmur, rub   or gallop   Abdomen:     Soft, non-tender, non-distended, bowel sounds active all four   quadrants, no RT or guarding, no masses, no organomegaly   Extremities:   Warm and well perfused, distal pulses are intact, no              cyanosis or peripheral edema   MSK:   No joint or muscle swelling, tenderness or deformity   Skin:   Skin color, texture, turgor normal, no rashes or lesions   Neurologic/Psychiatric:   CNII-XII intact, normal strength and sensation       throughout, no asterixis; normal affect, memory, judgement     and insight      Results:  Lab Results   Component Value Date     (L) 03/19/2024    K 4.9 03/19/2024     03/19/2024    CO2 23 03/19/2024    BUN 55 (H) 03/19/2024    CREATININE 8.1 (H) 03/19/2024    CALCIUM 8.8 03/19/2024    ANIONGAP 12 03/19/2024    ESTGFRAFRICA 4.7 (A) 02/08/2022    EGFRNONAA 4.0 (A) 02/08/2022       Lab Results   Component Value Date    CALCIUM 8.8 03/19/2024    PHOS 3.1 03/16/2024       Recent Labs   Lab 03/19/24  0502   WBC 6.63   RBC 2.85*   HGB 8.3*   HCT 27.1*      MCV 95   MCH 29.1   MCHC 30.6*       I have spent >  minutes providing care for this patient for the above diagnoses. These services have included chart/data/imaging review, evaluation, exam, formulation of plan, , note preparation, and discussions with staff involved in this patient's care.    Raleigh Yee MD  [facility] [address] [address]  [phone] (p)  [phone] (f)

## 2024-03-20 ENCOUNTER — PATIENT OUTREACH (OUTPATIENT)
Dept: ADMINISTRATIVE | Facility: CLINIC | Age: 84
End: 2024-03-20
Payer: MEDICARE

## 2024-03-20 ENCOUNTER — PATIENT OUTREACH (OUTPATIENT)
Dept: FAMILY MEDICINE | Facility: CLINIC | Age: 84
End: 2024-03-20
Payer: MEDICARE

## 2024-03-20 NOTE — TELEPHONE ENCOUNTER
Discharge Information     Discharge Date:   3/19/2024    Primary Discharge Diagnosis:  cheat pain pneu      Discharge Summary:  Reviewed      Medication & Order Review     Were medication changes made or new medications added?   Yes    If so, has the patient filled the prescriptions?  No     Was Home Health ordered? Yes    If so, has Home Health contacted patient and/or initiated services?  No    Name of Home Health Agency?home health    Durable Medical Equipment ordered?  No     If so, has the DME provider contacted patient and delivered equipment?  N/A    Follow Up               Any problems since discharge? No    How is the patient feeling since returning home?  Pt is feeling fine     Have you set up recommended follow up appointments?  yes(cardiology, surgery, etc.)    Schedule Hospital Follow-up appointment within 7-14 days (preferably 7).      Notes:  pt is doing fine             Kaci Celeste

## 2024-03-20 NOTE — PROGRESS NOTES
C3 nurse spoke with Tyra Isaac  for a TCC post hospital discharge follow up call. The patient has a scheduled [facility] appointment with Kalpesh Goel MD on 03/21/2024 @ 1300.
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
## 2024-03-21 ENCOUNTER — OFFICE VISIT (OUTPATIENT)
Dept: FAMILY MEDICINE | Facility: CLINIC | Age: 84
End: 2024-03-21
Payer: MEDICARE

## 2024-03-21 VITALS
BODY MASS INDEX: 16.83 KG/M2 | SYSTOLIC BLOOD PRESSURE: 132 MMHG | WEIGHT: 101 LBS | HEIGHT: 65 IN | DIASTOLIC BLOOD PRESSURE: 63 MMHG

## 2024-03-21 DIAGNOSIS — Z09 HOSPITAL DISCHARGE FOLLOW-UP: ICD-10-CM

## 2024-03-21 DIAGNOSIS — N18.6 STAGE 5 CHRONIC KIDNEY DISEASE ON CHRONIC DIALYSIS: ICD-10-CM

## 2024-03-21 DIAGNOSIS — I13.2 HYPERTENSIVE HEART AND CHRONIC KIDNEY DISEASE WITH HEART FAILURE AND WITH STAGE 5 CHRONIC KIDNEY DISEASE, OR END STAGE RENAL DISEASE: Primary | ICD-10-CM

## 2024-03-21 DIAGNOSIS — J18.9 PNEUMONIA OF LEFT LOWER LOBE DUE TO INFECTIOUS ORGANISM: ICD-10-CM

## 2024-03-21 DIAGNOSIS — I95.3 HEMODIALYSIS-ASSOCIATED HYPOTENSION: ICD-10-CM

## 2024-03-21 DIAGNOSIS — E44.0 MODERATE PROTEIN-CALORIE MALNUTRITION: ICD-10-CM

## 2024-03-21 DIAGNOSIS — Z99.2 STAGE 5 CHRONIC KIDNEY DISEASE ON CHRONIC DIALYSIS: ICD-10-CM

## 2024-03-21 DIAGNOSIS — I48.0 PAROXYSMAL ATRIAL FIBRILLATION: ICD-10-CM

## 2024-03-21 DIAGNOSIS — G31.84 MILD COGNITIVE IMPAIRMENT: Chronic | ICD-10-CM

## 2024-03-21 DIAGNOSIS — I25.118 CORONARY ARTERY DISEASE OF NATIVE HEART WITH STABLE ANGINA PECTORIS, UNSPECIFIED VESSEL OR LESION TYPE: ICD-10-CM

## 2024-03-21 PROCEDURE — 1126F AMNT PAIN NOTED NONE PRSNT: CPT | Mod: HCNC,CPTII,S$GLB, | Performed by: INTERNAL MEDICINE

## 2024-03-21 PROCEDURE — 1101F PT FALLS ASSESS-DOCD LE1/YR: CPT | Mod: HCNC,CPTII,S$GLB, | Performed by: INTERNAL MEDICINE

## 2024-03-21 PROCEDURE — 3288F FALL RISK ASSESSMENT DOCD: CPT | Mod: HCNC,CPTII,S$GLB, | Performed by: INTERNAL MEDICINE

## 2024-03-21 PROCEDURE — 3075F SYST BP GE 130 - 139MM HG: CPT | Mod: HCNC,CPTII,S$GLB, | Performed by: INTERNAL MEDICINE

## 2024-03-21 PROCEDURE — 1111F DSCHRG MED/CURRENT MED MERGE: CPT | Mod: HCNC,CPTII,S$GLB, | Performed by: INTERNAL MEDICINE

## 2024-03-21 PROCEDURE — 99496 TRANSJ CARE MGMT HIGH F2F 7D: CPT | Mod: HCNC,S$GLB,, | Performed by: INTERNAL MEDICINE

## 2024-03-21 PROCEDURE — 99999 PR PBB SHADOW E&M-EST. PATIENT-LVL III: CPT | Mod: PBBFAC,HCNC,, | Performed by: INTERNAL MEDICINE

## 2024-03-21 PROCEDURE — 1159F MED LIST DOCD IN RCRD: CPT | Mod: HCNC,CPTII,S$GLB, | Performed by: INTERNAL MEDICINE

## 2024-03-21 PROCEDURE — 3078F DIAST BP <80 MM HG: CPT | Mod: HCNC,CPTII,S$GLB, | Performed by: INTERNAL MEDICINE

## 2024-03-21 PROCEDURE — 1160F RVW MEDS BY RX/DR IN RCRD: CPT | Mod: HCNC,CPTII,S$GLB, | Performed by: INTERNAL MEDICINE

## 2024-03-21 NOTE — Clinical Note
This patient I saw today post hospital follow-up.  Seems to be having some more episodes of hypotension while going through dialysis.  Not sure if she has been over-dialyzed or probably a pneumonia was brewing.  Regards   Dr. Manasa SOSA

## 2024-03-22 LAB
BACTERIA BLD CULT: NORMAL
BACTERIA BLD CULT: NORMAL

## 2024-03-31 LAB
OHS QRS DURATION: 72 MS
OHS QRS DURATION: 78 MS
OHS QTC CALCULATION: 454 MS
OHS QTC CALCULATION: 521 MS
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
## 2024-04-03 ENCOUNTER — OFFICE VISIT (OUTPATIENT)
Dept: FAMILY MEDICINE | Facility: CLINIC | Age: 84
End: 2024-04-03
Payer: MEDICARE

## 2024-04-03 VITALS
DIASTOLIC BLOOD PRESSURE: 72 MMHG | SYSTOLIC BLOOD PRESSURE: 129 MMHG | WEIGHT: 100 LBS | BODY MASS INDEX: 16.66 KG/M2 | HEIGHT: 65 IN | HEART RATE: 87 BPM

## 2024-04-03 DIAGNOSIS — D63.1 ANEMIA DUE TO CHRONIC KIDNEY DISEASE, ON CHRONIC DIALYSIS: ICD-10-CM

## 2024-04-03 DIAGNOSIS — Z99.2 ANEMIA DUE TO CHRONIC KIDNEY DISEASE, ON CHRONIC DIALYSIS: ICD-10-CM

## 2024-04-03 DIAGNOSIS — I25.118 CORONARY ARTERY DISEASE OF NATIVE HEART WITH STABLE ANGINA PECTORIS, UNSPECIFIED VESSEL OR LESION TYPE: ICD-10-CM

## 2024-04-03 DIAGNOSIS — N18.6 STAGE 5 CHRONIC KIDNEY DISEASE ON CHRONIC DIALYSIS: ICD-10-CM

## 2024-04-03 DIAGNOSIS — N18.6 ANEMIA DUE TO CHRONIC KIDNEY DISEASE, ON CHRONIC DIALYSIS: ICD-10-CM

## 2024-04-03 DIAGNOSIS — I95.3 HEMODIALYSIS-ASSOCIATED HYPOTENSION: ICD-10-CM

## 2024-04-03 DIAGNOSIS — Z99.2 STAGE 5 CHRONIC KIDNEY DISEASE ON CHRONIC DIALYSIS: ICD-10-CM

## 2024-04-03 DIAGNOSIS — I13.2 HYPERTENSIVE HEART AND CHRONIC KIDNEY DISEASE WITH HEART FAILURE AND WITH STAGE 5 CHRONIC KIDNEY DISEASE, OR END STAGE RENAL DISEASE: Primary | Chronic | ICD-10-CM

## 2024-04-03 DIAGNOSIS — I48.0 PAROXYSMAL ATRIAL FIBRILLATION: ICD-10-CM

## 2024-04-03 DIAGNOSIS — D84.81 IMMUNODEFICIENCY DUE TO CONDITIONS CLASSIFIED ELSEWHERE: ICD-10-CM

## 2024-04-03 DIAGNOSIS — E44.0 MODERATE PROTEIN-CALORIE MALNUTRITION: ICD-10-CM

## 2024-04-03 PROCEDURE — 3078F DIAST BP <80 MM HG: CPT | Mod: HCNC,CPTII,S$GLB, | Performed by: INTERNAL MEDICINE

## 2024-04-03 PROCEDURE — 99999 PR PBB SHADOW E&M-EST. PATIENT-LVL III: CPT | Mod: PBBFAC,HCNC,, | Performed by: INTERNAL MEDICINE

## 2024-04-03 PROCEDURE — 99214 OFFICE O/P EST MOD 30 MIN: CPT | Mod: HCNC,S$GLB,, | Performed by: INTERNAL MEDICINE

## 2024-04-03 PROCEDURE — 3288F FALL RISK ASSESSMENT DOCD: CPT | Mod: HCNC,CPTII,S$GLB, | Performed by: INTERNAL MEDICINE

## 2024-04-03 PROCEDURE — 1160F RVW MEDS BY RX/DR IN RCRD: CPT | Mod: HCNC,CPTII,S$GLB, | Performed by: INTERNAL MEDICINE

## 2024-04-03 PROCEDURE — 1126F AMNT PAIN NOTED NONE PRSNT: CPT | Mod: HCNC,CPTII,S$GLB, | Performed by: INTERNAL MEDICINE

## 2024-04-03 PROCEDURE — 1159F MED LIST DOCD IN RCRD: CPT | Mod: HCNC,CPTII,S$GLB, | Performed by: INTERNAL MEDICINE

## 2024-04-03 PROCEDURE — 3074F SYST BP LT 130 MM HG: CPT | Mod: HCNC,CPTII,S$GLB, | Performed by: INTERNAL MEDICINE

## 2024-04-03 PROCEDURE — 1111F DSCHRG MED/CURRENT MED MERGE: CPT | Mod: HCNC,CPTII,S$GLB, | Performed by: INTERNAL MEDICINE

## 2024-04-03 PROCEDURE — 1100F PTFALLS ASSESS-DOCD GE2>/YR: CPT | Mod: HCNC,CPTII,S$GLB, | Performed by: INTERNAL MEDICINE

## 2024-04-23 ENCOUNTER — DOCUMENT SCAN (OUTPATIENT)
Dept: HOME HEALTH SERVICES | Facility: HOSPITAL | Age: 84
End: 2024-04-23
Payer: MEDICARE

## 2024-04-30 ENCOUNTER — EXTERNAL HOME HEALTH (OUTPATIENT)
Dept: HOME HEALTH SERVICES | Facility: HOSPITAL | Age: 84
End: 2024-04-30
Payer: MEDICARE

## 2024-05-10 ENCOUNTER — DOCUMENT SCAN (OUTPATIENT)
Dept: HOME HEALTH SERVICES | Facility: HOSPITAL | Age: 84
End: 2024-05-10
Payer: MEDICARE

## 2024-05-28 ENCOUNTER — DOCUMENT SCAN (OUTPATIENT)
Dept: HOME HEALTH SERVICES | Facility: HOSPITAL | Age: 84
End: 2024-05-28
Payer: MEDICARE

## 2024-06-03 ENCOUNTER — HOSPITAL ENCOUNTER (EMERGENCY)
Facility: HOSPITAL | Age: 84
Discharge: HOME | End: 2024-06-03
Attending: EMERGENCY MEDICINE
Payer: MEDICARE

## 2024-06-03 VITALS
SYSTOLIC BLOOD PRESSURE: 130 MMHG | DIASTOLIC BLOOD PRESSURE: 60 MMHG | OXYGEN SATURATION: 97 % | HEIGHT: 65 IN | WEIGHT: 99.44 LBS | TEMPERATURE: 99 F | RESPIRATION RATE: 18 BRPM | BODY MASS INDEX: 16.57 KG/M2 | HEART RATE: 69 BPM

## 2024-06-03 DIAGNOSIS — R25.9 INVOLUNTARY MOVEMENTS: ICD-10-CM

## 2024-06-03 LAB
ALBUMIN SERPL BCP-MCNC: 4.4 G/DL (ref 3.5–5.2)
ALP SERPL-CCNC: 63 U/L (ref 55–135)
ALT SERPL W/O P-5'-P-CCNC: 13 U/L (ref 10–44)
ANION GAP SERPL CALC-SCNC: 12 MMOL/L (ref 8–16)
AST SERPL-CCNC: 28 U/L (ref 10–40)
BASOPHILS # BLD AUTO: 0.02 K/UL (ref 0–0.2)
BASOPHILS NFR BLD: 0.3 % (ref 0–1.9)
BILIRUB SERPL-MCNC: 0.7 MG/DL (ref 0.1–1)
BILIRUB UR QL STRIP: NEGATIVE
BUN SERPL-MCNC: 34 MG/DL (ref 8–23)
CALCIUM SERPL-MCNC: 9 MG/DL (ref 8.7–10.5)
CHLORIDE SERPL-SCNC: 98 MMOL/L (ref 95–110)
CLARITY UR: CLEAR
CO2 SERPL-SCNC: 32 MMOL/L (ref 23–29)
COLOR UR: YELLOW
CREAT SERPL-MCNC: 6 MG/DL (ref 0.5–1.4)
DIFFERENTIAL METHOD BLD: ABNORMAL
EOSINOPHIL # BLD AUTO: 0.6 K/UL (ref 0–0.5)
EOSINOPHIL NFR BLD: 8.6 % (ref 0–8)
ERYTHROCYTE [DISTWIDTH] IN BLOOD BY AUTOMATED COUNT: 18.2 % (ref 11.5–14.5)
EST. GFR  (NO RACE VARIABLE): 6.5 ML/MIN/1.73 M^2
GLUCOSE SERPL-MCNC: 107 MG/DL (ref 70–110)
GLUCOSE SERPL-MCNC: 62 MG/DL (ref 70–110)
GLUCOSE SERPL-MCNC: 65 MG/DL (ref 70–110)
GLUCOSE SERPL-MCNC: 71 MG/DL (ref 70–110)
GLUCOSE UR QL STRIP: NEGATIVE
HCT VFR BLD AUTO: 40.2 % (ref 37–48.5)
HGB BLD-MCNC: 11.8 G/DL (ref 12–16)
HGB UR QL STRIP: NEGATIVE
IMM GRANULOCYTES # BLD AUTO: 0.02 K/UL (ref 0–0.04)
IMM GRANULOCYTES NFR BLD AUTO: 0.3 % (ref 0–0.5)
KETONES UR QL STRIP: NEGATIVE
LEUKOCYTE ESTERASE UR QL STRIP: NEGATIVE
LYMPHOCYTES # BLD AUTO: 1.4 K/UL (ref 1–4.8)
LYMPHOCYTES NFR BLD: 19.3 % (ref 18–48)
MAGNESIUM SERPL-MCNC: 2 MG/DL (ref 1.6–2.6)
MCH RBC QN AUTO: 26.6 PG (ref 27–31)
MCHC RBC AUTO-ENTMCNC: 29.4 G/DL (ref 32–36)
MCV RBC AUTO: 91 FL (ref 82–98)
MONOCYTES # BLD AUTO: 1.1 K/UL (ref 0.3–1)
MONOCYTES NFR BLD: 16.1 % (ref 4–15)
NEUTROPHILS # BLD AUTO: 3.9 K/UL (ref 1.8–7.7)
NEUTROPHILS NFR BLD: 55.4 % (ref 38–73)
NITRITE UR QL STRIP: NEGATIVE
NRBC BLD-RTO: 0 /100 WBC
PH UR STRIP: 8 [PH] (ref 5–8)
PLATELET # BLD AUTO: 226 K/UL (ref 150–450)
PMV BLD AUTO: 9.7 FL (ref 9.2–12.9)
POTASSIUM SERPL-SCNC: 3.7 MMOL/L (ref 3.5–5.1)
PROT SERPL-MCNC: 7.7 G/DL (ref 6–8.4)
PROT UR QL STRIP: NEGATIVE
RBC # BLD AUTO: 4.43 M/UL (ref 4–5.4)
SODIUM SERPL-SCNC: 142 MMOL/L (ref 136–145)
SP GR UR STRIP: 1.01 (ref 1–1.03)
URN SPEC COLLECT METH UR: NORMAL
UROBILINOGEN UR STRIP-ACNC: NEGATIVE EU/DL
WBC # BLD AUTO: 7 K/UL (ref 3.9–12.7)

## 2024-06-03 PROCEDURE — 93005 ELECTROCARDIOGRAM TRACING: CPT | Performed by: INTERNAL MEDICINE

## 2024-06-03 PROCEDURE — 83735 ASSAY OF MAGNESIUM: CPT | Performed by: EMERGENCY MEDICINE

## 2024-06-03 PROCEDURE — 85025 COMPLETE CBC W/AUTO DIFF WBC: CPT | Performed by: EMERGENCY MEDICINE

## 2024-06-03 PROCEDURE — 81003 URINALYSIS AUTO W/O SCOPE: CPT | Performed by: EMERGENCY MEDICINE

## 2024-06-03 PROCEDURE — 96374 THER/PROPH/DIAG INJ IV PUSH: CPT

## 2024-06-03 PROCEDURE — 82962 GLUCOSE BLOOD TEST: CPT | Mod: 91

## 2024-06-03 PROCEDURE — 25000003 PHARM REV CODE 250: Performed by: EMERGENCY MEDICINE

## 2024-06-03 PROCEDURE — 80053 COMPREHEN METABOLIC PANEL: CPT | Performed by: EMERGENCY MEDICINE

## 2024-06-03 PROCEDURE — 99284 EMERGENCY DEPT VISIT MOD MDM: CPT | Mod: 25

## 2024-06-03 PROCEDURE — 93010 ELECTROCARDIOGRAM REPORT: CPT | Mod: ,,, | Performed by: INTERNAL MEDICINE

## 2024-06-03 RX ORDER — DEXTROSE 50 % IN WATER (D50W) INTRAVENOUS SYRINGE
12.5
Status: COMPLETED | OUTPATIENT
Start: 2024-06-03 | End: 2024-06-03

## 2024-06-03 RX ORDER — LORAZEPAM 1 MG/1
1 TABLET ORAL
Status: COMPLETED | OUTPATIENT
Start: 2024-06-03 | End: 2024-06-03

## 2024-06-03 RX ORDER — DIPHENHYDRAMINE HYDROCHLORIDE 50 MG/ML
12.5 INJECTION INTRAMUSCULAR; INTRAVENOUS
Status: DISCONTINUED | OUTPATIENT
Start: 2024-06-03 | End: 2024-06-03

## 2024-06-03 RX ADMIN — DEXTROSE MONOHYDRATE 12.5 G: 25 INJECTION, SOLUTION INTRAVENOUS at 01:06

## 2024-06-03 RX ADMIN — LORAZEPAM 1 MG: 1 TABLET ORAL at 11:06

## 2024-06-03 NOTE — ED PROVIDER NOTES
"Encounter Date: 6/3/2024       History     Chief Complaint   Patient presents with    body twitching      Pt arrived via POV complaining of body twitching  since last night. Dialysis completed today.      84-year-old male presenting with family for evaluation jerking.  She says this began last night.  It had improved but recurred after her dialysis so she came to the ED for evaluation.  She was having involuntary jerking and twitching of her entire body.  They only last a 2nd or 2 but occur very frequently.  She has had similar symptoms in the past, proximally 2 years ago, for which he saw a neurologist.  Family says she had extensive outpatient workup and has been recommended to undergo "electric shock therapy" but the patient declined.    The history is provided by the patient and a relative.     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    Osteopenia of multiple sites 3/9/2018    Based upon bone density measurements. Patient also has chronic kidney disease.    Stroke 11/2016     Past Surgical History:   Procedure Laterality Date    ANGIOGRAM, CORONARY, WITH LEFT HEART CATHETERIZATION N/A 2/7/2022    Procedure: Angiogram, Coronary, with Left Heart Cath;  Surgeon: Gino Leal MD;  Location: [facility] CATH/EP LAB;  Service: Cardiology;  Laterality: N/A;    CARDIAC SURGERY      stents    EYE SURGERY      " WRIST SURGERY       Family History   Problem Relation Name Age of Onset    Heart disease Mother      Cancer Father       Social History     Tobacco Use    Smoking status: Never    Smokeless tobacco: Never   Substance Use Topics    Alcohol use: No    Drug use: No     Review of Systems   Neurological:  Positive for tremors.   All other systems reviewed and are negative.      Physical Exam     Initial Vitals [06/03/24 1103]   BP Pulse Resp Temp SpO2   (!) 154/66 69 18 98.5 °F (36.9 °C) 97 %      MAP       --         Physical Exam    Nursing note and vitals reviewed.  Constitutional: She appears well-developed and well-nourished. She is not diaphoretic. No distress.   HENT:   Head: Normocephalic.   Eyes: Conjunctivae are normal.   Neck: Neck supple.   Normal range of motion.  Cardiovascular:  Normal rate.           Pulmonary/Chest: No respiratory distress.   Abdominal: She exhibits no distension.   Musculoskeletal:         General: No edema.      Cervical back: Normal range of motion and neck supple.     Neurological: She is alert and oriented to person, place, and time. She has normal strength. No cranial nerve deficit.   Patient has involuntary jerking of her bilateral arms and chest that lasts less than a second but does occur every 30 seconds to a minute.   Skin: Skin is warm and dry.   Psychiatric: She has a normal mood and affect.         ED Course   Procedures  Labs Reviewed   CBC W/ AUTO DIFFERENTIAL - Abnormal; Notable for the following components:       Result Value    Hemoglobin 11.8 (*)     MCH 26.6 (*)     MCHC 29.4 (*)     RDW 18.2 (*)     Mono # 1.1 (*)     Eos # 0.6 (*)     Mono % 16.1 (*)     Eosinophil % 8.6 (*)     All other components within normal limits   COMPREHENSIVE METABOLIC PANEL - Abnormal; Notable for the following components:    CO2 32 (*)     Glucose 62 (*)     BUN 34 (*)     Creatinine 6.0 (*)     eGFR 6.5 (*)     All other components within normal limits   POCT GLUCOSE - Abnormal;  Notable for the following components:    POC Glucose 65 (*)     All other components within normal limits   MAGNESIUM   URINALYSIS, REFLEX TO URINE CULTURE    Narrative:     Specimen Source->Urine   POCT GLUCOSE   POCT GLUCOSE   POCT GLUCOSE MONITORING CONTINUOUS        ECG Results              EKG 12-lead (In process)        Collection Time Result Time QRS Duration OHS QTC Calculation    06/03/24 11:25:37 06/03/24 11:54:58 76 524                     In process by Interface, Lab In [facility] (06/03/24 11:55:07)                   Narrative:    Test Reason : R25.9,    Vent. Rate : 060 BPM     Atrial Rate : 060 BPM     P-R Int : 160 ms          QRS Dur : 076 ms      QT Int : 524 ms       P-R-T Axes : 084 -01 -73 degrees     QTc Int : 524 ms    Sinus rhythm with Premature supraventricular complexes  Voltage criteria for left ventricular hypertrophy  ST and Marked T wave abnormality, consider anterolateral ischemia  Prolonged QT  Abnormal ECG  When compared with ECG of 15-MAR-2024 15:43,  T wave inversion more evident in Anterior leads    Referred By: [email or web address]   SELF           Confirmed By:                                   Imaging Results    None          Medications   LORazepam tablet 1 mg (1 mg Oral Given 6/3/24 1150)   dextrose 50 % in water (D50W) injection 12.5 g (12.5 g Intravenous Given by Other 6/3/24 1328)     Medical Decision Making  84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  No focal or unilateral neurologic deficits.  I gave her 1 mg oral Ativan.  Her jerking resolved however it did sedate her for several hours in the ED.  Electrolytes are unremarkable.  Chronic renal failure.  Glucose was low normal in the 60s.  She was not on antihyperglycemics.  Son says that she has not had lunch today because she came street from her dialysis.  She was too sedated for oral glucose, and was given a half amp D50.  UA shows no infection and there is no other symptoms or evidence for infectious process.  After several  hours she eventually aroused, is now awake and alert and appropriate.  She was fed.  Glucose is now 100.  She was still not having any jerking.  She will be discharged home and referred to neurology clinic.      Amount and/or Complexity of Data Reviewed  Labs: ordered.    Risk  Prescription drug management.                                      Clinical Impression:  Final diagnoses:  [R25.9] Involuntary movements          ED Disposition Condition    Discharge Stable          ED Prescriptions    None       Follow-up Information       Follow up With Specialties Details Why Contact Info    Kalpesh Goel MD Internal Medicine   [address]  [address]  [place] [address]  [phone]      Rafael Wagner MD Neurology   [address]   [place] [address]  [phone]               Rolly Bryant MD  06/03/24 3077

## 2024-06-03 NOTE — DISCHARGE INSTRUCTIONS
Return to the ER for worsening symptoms or for any other concerns.  Follow-up with your PCP as well as neurology clinic for re-evaluation.

## 2024-06-04 ENCOUNTER — PATIENT OUTREACH (OUTPATIENT)
Dept: EMERGENCY MEDICINE | Facility: HOSPITAL | Age: 84
End: 2024-06-04

## 2024-06-04 NOTE — PROGRESS NOTES
Pt's son, Raffi, said he is working on getting all appointments situated for pt, and has no needs at this time. Encouraged to call back if assistance is needed.    Hailey Pride  ED Navigator  [phone]

## 2024-06-06 ENCOUNTER — OFFICE VISIT (OUTPATIENT)
Dept: FAMILY MEDICINE | Facility: CLINIC | Age: 84
End: 2024-06-06
Payer: MEDICARE

## 2024-06-06 VITALS
DIASTOLIC BLOOD PRESSURE: 56 MMHG | WEIGHT: 103 LBS | HEIGHT: 65 IN | HEART RATE: 71 BPM | BODY MASS INDEX: 17.16 KG/M2 | SYSTOLIC BLOOD PRESSURE: 122 MMHG

## 2024-06-06 DIAGNOSIS — E44.0 MODERATE PROTEIN-CALORIE MALNUTRITION: ICD-10-CM

## 2024-06-06 DIAGNOSIS — D84.81 IMMUNODEFICIENCY DUE TO CONDITIONS CLASSIFIED ELSEWHERE: ICD-10-CM

## 2024-06-06 DIAGNOSIS — G31.84 MILD COGNITIVE IMPAIRMENT: ICD-10-CM

## 2024-06-06 DIAGNOSIS — I95.3 HEMODIALYSIS-ASSOCIATED HYPOTENSION: ICD-10-CM

## 2024-06-06 DIAGNOSIS — Z59.9 FINANCIAL DIFFICULTIES: ICD-10-CM

## 2024-06-06 DIAGNOSIS — Z13.820 ENCOUNTER FOR OSTEOPOROSIS SCREENING IN ASYMPTOMATIC POSTMENOPAUSAL PATIENT: ICD-10-CM

## 2024-06-06 DIAGNOSIS — I13.2 HYPERTENSIVE HEART AND CHRONIC KIDNEY DISEASE WITH HEART FAILURE AND WITH STAGE 5 CHRONIC KIDNEY DISEASE, OR END STAGE RENAL DISEASE: Primary | ICD-10-CM

## 2024-06-06 DIAGNOSIS — N18.6 STAGE 5 CHRONIC KIDNEY DISEASE ON CHRONIC DIALYSIS: ICD-10-CM

## 2024-06-06 DIAGNOSIS — Z78.0 ENCOUNTER FOR OSTEOPOROSIS SCREENING IN ASYMPTOMATIC POSTMENOPAUSAL PATIENT: ICD-10-CM

## 2024-06-06 DIAGNOSIS — I25.118 CORONARY ARTERY DISEASE OF NATIVE HEART WITH STABLE ANGINA PECTORIS, UNSPECIFIED VESSEL OR LESION TYPE: ICD-10-CM

## 2024-06-06 DIAGNOSIS — I48.0 PAROXYSMAL ATRIAL FIBRILLATION: ICD-10-CM

## 2024-06-06 DIAGNOSIS — Z99.2 STAGE 5 CHRONIC KIDNEY DISEASE ON CHRONIC DIALYSIS: ICD-10-CM

## 2024-06-06 LAB
OHS QRS DURATION: 76 MS
OHS QTC CALCULATION: 524 MS

## 2024-06-06 PROCEDURE — 1160F RVW MEDS BY RX/DR IN RCRD: CPT | Mod: HCNC,CPTII,S$GLB, | Performed by: INTERNAL MEDICINE

## 2024-06-06 PROCEDURE — 3074F SYST BP LT 130 MM HG: CPT | Mod: HCNC,CPTII,S$GLB, | Performed by: INTERNAL MEDICINE

## 2024-06-06 PROCEDURE — 3078F DIAST BP <80 MM HG: CPT | Mod: HCNC,CPTII,S$GLB, | Performed by: INTERNAL MEDICINE

## 2024-06-06 PROCEDURE — 1126F AMNT PAIN NOTED NONE PRSNT: CPT | Mod: HCNC,CPTII,S$GLB, | Performed by: INTERNAL MEDICINE

## 2024-06-06 PROCEDURE — 3288F FALL RISK ASSESSMENT DOCD: CPT | Mod: HCNC,CPTII,S$GLB, | Performed by: INTERNAL MEDICINE

## 2024-06-06 PROCEDURE — 1159F MED LIST DOCD IN RCRD: CPT | Mod: HCNC,CPTII,S$GLB, | Performed by: INTERNAL MEDICINE

## 2024-06-06 PROCEDURE — 99214 OFFICE O/P EST MOD 30 MIN: CPT | Mod: HCNC,S$GLB,, | Performed by: INTERNAL MEDICINE

## 2024-06-06 PROCEDURE — 99999 PR PBB SHADOW E&M-EST. PATIENT-LVL III: CPT | Mod: PBBFAC,HCNC,, | Performed by: INTERNAL MEDICINE

## 2024-06-06 PROCEDURE — 1101F PT FALLS ASSESS-DOCD LE1/YR: CPT | Mod: HCNC,CPTII,S$GLB, | Performed by: INTERNAL MEDICINE

## 2024-06-06 SDOH — SOCIAL DETERMINANTS OF HEALTH (SDOH): PROBLEM RELATED TO HOUSING AND ECONOMIC CIRCUMSTANCES, UNSPECIFIED: Z59.9
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
## 2024-06-19 ENCOUNTER — OUTPATIENT CASE MANAGEMENT (OUTPATIENT)
Dept: ADMINISTRATIVE | Facility: OTHER | Age: 84
End: 2024-06-19
Payer: MEDICARE

## 2024-06-19 NOTE — LETTER
Tyra Isaac  [facility] [facility] [address]          Dear Tyra Isaac,     I work with [facility]'s Outpatient Care Management Department. We received a referral to call you to discuss your medical history. These services are free of charge and are offered to [facility] patients who have recently been discharged from any of our facilities or who have medical conditions that may require the skill of a nurse to assist with management.     I am a Registered Nurse who specializes in connecting patients with available medical and financial resources as well as addressing any educational needs that may be indicated.    I attempted to reach you by telephone, but I was unsuccessful. Please call our department so that we can go over some questions with you, regarding your health.    The Outpatient Care Management Department can be reached at [phone], from 8:00AM to 4:30 PM, on Monday thru Friday.     Additionally, [facility] also has a program where a nurse is available 24/7 to answer questions or provide medical advice, their number is [phone].      Thanks,  Eliza Bird RN [facility]   Outpatient Care Management  Phone #: [phone]

## 2024-06-19 NOTE — PROGRESS NOTES
6/19/2024  1st attempt to complete Initial Assessment  for Outpatient Care Management, mail box not set up .  Will mail unable to assess letter.

## 2024-06-21 ENCOUNTER — HOSPITAL ENCOUNTER (OUTPATIENT)
Dept: RADIOLOGY | Facility: HOSPITAL | Age: 84
Discharge: HOME | End: 2024-06-21
Attending: INTERNAL MEDICINE
Payer: MEDICARE

## 2024-06-21 DIAGNOSIS — Z78.0 ENCOUNTER FOR OSTEOPOROSIS SCREENING IN ASYMPTOMATIC POSTMENOPAUSAL PATIENT: ICD-10-CM

## 2024-06-21 DIAGNOSIS — Z13.820 ENCOUNTER FOR OSTEOPOROSIS SCREENING IN ASYMPTOMATIC POSTMENOPAUSAL PATIENT: ICD-10-CM

## 2024-06-21 PROCEDURE — 77080 DXA BONE DENSITY AXIAL: CPT | Mod: TC,PO

## 2024-06-21 PROCEDURE — 77080 DXA BONE DENSITY AXIAL: CPT | Mod: 26,,, | Performed by: RADIOLOGY

## 2024-06-23 ENCOUNTER — TELEPHONE (OUTPATIENT)
Dept: FAMILY MEDICINE | Facility: CLINIC | Age: 84
End: 2024-06-23

## 2024-06-24 ENCOUNTER — OUTPATIENT CASE MANAGEMENT (OUTPATIENT)
Dept: ADMINISTRATIVE | Facility: OTHER | Age: 84
End: 2024-06-24
Payer: MEDICARE

## 2024-06-24 NOTE — TELEPHONE ENCOUNTER
Treating an 84-year-old  female with a BMI of 17, who is on chronic hemodialysis for end-stage renal disease (ESRD) and has osteoporosis, requires a multifaceted approach due to her complex medical condition. Here is a detailed plan focusing on her osteoporosis management, considering her ESRD and nutritional status:  1. Nutritional Support:  Dietitian Consultation: A dietitian specialized in renal nutrition should be involved to tailor a diet plan that meets her caloric and protein needs without exacerbating her renal condition.  High-Calorie, Nutrient-Dense Foods: Consider incorporating small, frequent meals rich in calories and nutrients to help address her low BMI.  Protein Intake: Ensure adequate protein intake to prevent muscle wasting, but adjust according to her dialysis requirements.  2. Bone Health Management:  Calcium and Phosphate Balance:   Calcium Supplementation: Carefully monitor and supplement calcium as needed, considering her dialysis regimen and serum calcium levels.  Phosphate Binders: Use phosphate binders to manage hyperphosphatemia, common in patients with ESRD, which can exacerbate bone disease.  Vitamin D Supplementation:  Active Vitamin D (Calcitriol or Paricalcitol): These forms are often preferred in ESRD patients to manage secondary hyperparathyroidism and improve bone health.  Medications for Osteoporosis:  Bisphosphonates: Generally avoided in ESRD due to potential nephrotoxicity and limited efficacy.  Denosumab: Can be considered as it is not renally excreted, but careful monitoring is required.  Teriparatide: May be used, but there are concerns regarding safety in patients with renal impairment. Specialist consultation is recommended.  3. Dialysis Management:  Adequate Dialysis: Ensure she is receiving adequate dialysis to manage her overall metabolic state, which can influence bone health.  4. Fall Prevention:  Home Safety Evaluation: Evaluate her home for fall  risks and make necessary modifications.  Assistive Devices: Consider providing assistive devices like walkers or canes to improve mobility and reduce fall risk.  Physical Therapy: Engage her in a physical therapy program focused on strength, balance, and mobility exercises.  5. Regular Monitoring:  Bone Mineral Density (BMD): Regularly monitor BMD to assess the effectiveness of osteoporosis treatment.  Laboratory Tests: Frequent monitoring of serum calcium, phosphate, parathyroid hormone (PTH), and vitamin D levels.  Nutritional Status: Regularly assess her nutritional status, including weight, BMI, and serum albumin levels.  6. Patient Education:  Diet and Medication Adherence: Educate her about the importance of adhering to her dietary restrictions and medication regimen.  Exercise: Encourage weight-bearing and muscle-strengthening exercises appropriate for her physical condition.  7. Coordination of Care:  Multidisciplinary Approach: Ensure a coordinated approach involving nephrologists, endocrinologists, dietitians, physical therapists, and primary care providers.  8. Psychosocial Support:  : Engage  to address any psychosocial issues, such as depression or social isolation, which can impact her overall health and compliance with treatment.  By addressing her osteoporosis in the context of her ESRD and low BMI, you can improve her overall quality of life and reduce the risk of fractures. Regular follow-ups and adjustments to her treatment plan will be essential based on her response and any changes in her condition.

## 2024-06-24 NOTE — PROGRESS NOTES
6/24/2024  3rd attempt to complete Initial Assessment  for Outpatient Care Management, not available. Son states that he will call back with patient for assessment.

## 2024-06-25 ENCOUNTER — TELEPHONE (OUTPATIENT)
Dept: FAMILY MEDICINE | Facility: CLINIC | Age: 84
End: 2024-06-25
Payer: MEDICARE

## 2024-06-25 ENCOUNTER — OUTPATIENT CASE MANAGEMENT (OUTPATIENT)
Dept: ADMINISTRATIVE | Facility: OTHER | Age: 84
End: 2024-06-25
Payer: MEDICARE

## 2024-06-25 NOTE — TELEPHONE ENCOUNTER
----- Message from Eliza Bird RN sent at 6/25/2024  2:04 PM CDT -----  Just wanted to update you that I have been unable to reach patient/caregiver after multiple phone calls and a letter mailed. Thank you for the referral.       Eliza Bird RN [facility]   Outpatient Complex Care Management   [phone]

## 2024-06-25 NOTE — PROGRESS NOTES
6/25/2024  3rd attempt to complete Initial Assessment  for Outpatient Care Management, unable to leave message.  Message to Dr. Goel unable to contact patient/caregiver. OPCM Case Closure.

## 2024-06-27 ENCOUNTER — HOSPITAL ENCOUNTER (INPATIENT)
Facility: HOSPITAL | Age: 84
LOS: 8 days | Discharge: HOME HEALTH | DRG: 640 | End: 2024-07-06
Attending: EMERGENCY MEDICINE | Admitting: INTERNAL MEDICINE
Payer: MEDICARE

## 2024-06-27 DIAGNOSIS — N18.6 ESRD (END STAGE RENAL DISEASE): ICD-10-CM

## 2024-06-27 DIAGNOSIS — R06.02 SOB (SHORTNESS OF BREATH): Primary | ICD-10-CM

## 2024-06-27 DIAGNOSIS — R53.81 DEBILITY: ICD-10-CM

## 2024-06-27 DIAGNOSIS — R07.9 CHEST PAIN: ICD-10-CM

## 2024-06-27 DIAGNOSIS — E87.5 HYPERKALEMIA: ICD-10-CM

## 2024-06-27 DIAGNOSIS — N18.6 END STAGE RENAL DISEASE ON DIALYSIS: ICD-10-CM

## 2024-06-27 DIAGNOSIS — Z99.2 END STAGE RENAL DISEASE ON DIALYSIS: ICD-10-CM

## 2024-06-27 DIAGNOSIS — R00.0 TACHYCARDIA: ICD-10-CM

## 2024-06-27 DIAGNOSIS — I49.9 ABNORMAL HEART RHYTHM: ICD-10-CM

## 2024-06-27 PROCEDURE — 93010 ELECTROCARDIOGRAM REPORT: CPT | Mod: ,,, | Performed by: INTERNAL MEDICINE

## 2024-06-27 PROCEDURE — 99291 CRITICAL CARE FIRST HOUR: CPT

## 2024-06-27 PROCEDURE — 93005 ELECTROCARDIOGRAM TRACING: CPT | Performed by: INTERNAL MEDICINE

## 2024-06-28 ENCOUNTER — CLINICAL SUPPORT (OUTPATIENT)
Dept: CARDIOLOGY | Facility: HOSPITAL | Age: 84
End: 2024-06-28
Attending: STUDENT IN AN ORGANIZED HEALTH CARE EDUCATION/TRAINING PROGRAM
Payer: MEDICARE

## 2024-06-28 VITALS — WEIGHT: 107.38 LBS | HEIGHT: 65 IN | BODY MASS INDEX: 17.89 KG/M2

## 2024-06-28 PROBLEM — E87.5 HYPERKALEMIA: Status: ACTIVE | Noted: 2024-06-28

## 2024-06-28 LAB
ALBUMIN SERPL BCP-MCNC: 3.8 G/DL (ref 3.5–5.2)
ALBUMIN SERPL BCP-MCNC: 4.3 G/DL (ref 3.5–5.2)
ALBUMIN SERPL BCP-MCNC: 4.4 G/DL (ref 3.5–5.2)
ALP SERPL-CCNC: 58 U/L (ref 55–135)
ALP SERPL-CCNC: 64 U/L (ref 55–135)
ALP SERPL-CCNC: 74 U/L (ref 55–135)
ALT SERPL W/O P-5'-P-CCNC: 13 U/L (ref 10–44)
ALT SERPL W/O P-5'-P-CCNC: 16 U/L (ref 10–44)
ALT SERPL W/O P-5'-P-CCNC: 17 U/L (ref 10–44)
ANION GAP SERPL CALC-SCNC: 12 MMOL/L (ref 8–16)
ANION GAP SERPL CALC-SCNC: 13 MMOL/L (ref 8–16)
ANION GAP SERPL CALC-SCNC: 13 MMOL/L (ref 8–16)
AORTIC ROOT ANNULUS: 3 CM
AORTIC VALVE CUSP SEPERATION: 0.8 CM
APICAL FOUR CHAMBER EJECTION FRACTION: 67 %
APICAL TWO CHAMBER EJECTION FRACTION: 60 %
AST SERPL-CCNC: 17 U/L (ref 10–40)
AST SERPL-CCNC: 21 U/L (ref 10–40)
AST SERPL-CCNC: 25 U/L (ref 10–40)
AV INDEX (PROSTH): 0.79
AV MEAN GRADIENT: 3 MMHG
AV PEAK GRADIENT: 6 MMHG
AV VALVE AREA BY VELOCITY RATIO: 1.77 CM²
AV VALVE AREA: 1.8 CM²
AV VELOCITY RATIO: 0.78
BASOPHILS # BLD AUTO: 0.02 K/UL (ref 0–0.2)
BASOPHILS # BLD AUTO: 0.02 K/UL (ref 0–0.2)
BASOPHILS NFR BLD: 0.3 % (ref 0–1.9)
BASOPHILS NFR BLD: 0.3 % (ref 0–1.9)
BILIRUB SERPL-MCNC: 0.4 MG/DL (ref 0.1–1)
BILIRUB SERPL-MCNC: 0.4 MG/DL (ref 0.1–1)
BILIRUB SERPL-MCNC: 0.6 MG/DL (ref 0.1–1)
BNP SERPL-MCNC: 939 PG/ML (ref 0–99)
BSA FOR ECHO PROCEDURE: 1.49 M2
BUN SERPL-MCNC: 38 MG/DL (ref 8–23)
BUN SERPL-MCNC: 56 MG/DL (ref 8–23)
BUN SERPL-MCNC: 58 MG/DL (ref 8–23)
CALCIUM SERPL-MCNC: 9.5 MG/DL (ref 8.7–10.5)
CALCIUM SERPL-MCNC: 9.5 MG/DL (ref 8.7–10.5)
CALCIUM SERPL-MCNC: 9.6 MG/DL (ref 8.7–10.5)
CHLORIDE SERPL-SCNC: 95 MMOL/L (ref 95–110)
CHLORIDE SERPL-SCNC: 98 MMOL/L (ref 95–110)
CHLORIDE SERPL-SCNC: 99 MMOL/L (ref 95–110)
CHOLEST SERPL-MCNC: 136 MG/DL (ref 120–199)
CHOLEST/HDLC SERPL: 2.2 {RATIO} (ref 2–5)
CO2 SERPL-SCNC: 30 MMOL/L (ref 23–29)
CREAT SERPL-MCNC: 7.7 MG/DL (ref 0.5–1.4)
CREAT SERPL-MCNC: 9.2 MG/DL (ref 0.5–1.4)
CREAT SERPL-MCNC: 9.7 MG/DL (ref 0.5–1.4)
CV ECHO LV RWT: 0.65 CM
DIFFERENTIAL METHOD BLD: ABNORMAL
DIFFERENTIAL METHOD BLD: ABNORMAL
DOP CALC AO PEAK VEL: 1.22 M/S
DOP CALC AO VTI: 23.6 CM
DOP CALC LVOT AREA: 2.3 CM2
DOP CALC LVOT DIAMETER: 1.7 CM
DOP CALC LVOT PEAK VEL: 0.95 M/S
DOP CALC LVOT STROKE VOLUME: 42.42 CM3
DOP CALC MV VTI: 61.6 CM
DOP CALCLVOT PEAK VEL VTI: 18.7 CM
E WAVE DECELERATION TIME: 264 MSEC
E/A RATIO: 1.9
ECHO LV POSTERIOR WALL: 1.2 CM (ref 0.6–1.1)
EOSINOPHIL # BLD AUTO: 0.6 K/UL (ref 0–0.5)
EOSINOPHIL # BLD AUTO: 0.7 K/UL (ref 0–0.5)
EOSINOPHIL NFR BLD: 10 % (ref 0–8)
EOSINOPHIL NFR BLD: 11.2 % (ref 0–8)
ERYTHROCYTE [DISTWIDTH] IN BLOOD BY AUTOMATED COUNT: 17.1 % (ref 11.5–14.5)
ERYTHROCYTE [DISTWIDTH] IN BLOOD BY AUTOMATED COUNT: 17.2 % (ref 11.5–14.5)
ERYTHROCYTE [DISTWIDTH] IN BLOOD BY AUTOMATED COUNT: 17.4 % (ref 11.5–14.5)
EST. GFR  (NO RACE VARIABLE): 3.6 ML/MIN/1.73 M^2
EST. GFR  (NO RACE VARIABLE): 3.9 ML/MIN/1.73 M^2
EST. GFR  (NO RACE VARIABLE): 4.8 ML/MIN/1.73 M^2
ESTIMATED AVG GLUCOSE: 100 MG/DL (ref 68–131)
FRACTIONAL SHORTENING: 35 % (ref 28–44)
GLUCOSE SERPL-MCNC: 103 MG/DL (ref 70–110)
GLUCOSE SERPL-MCNC: 109 MG/DL (ref 70–110)
GLUCOSE SERPL-MCNC: 120 MG/DL (ref 70–110)
GLUCOSE SERPL-MCNC: 54 MG/DL (ref 70–110)
GLUCOSE SERPL-MCNC: 62 MG/DL (ref 70–110)
GLUCOSE SERPL-MCNC: 87 MG/DL (ref 70–110)
GLUCOSE SERPL-MCNC: 90 MG/DL (ref 70–110)
GLUCOSE SERPL-MCNC: 90 MG/DL (ref 70–110)
HBA1C MFR BLD: 5.1 % (ref 4.5–6.2)
HCT VFR BLD AUTO: 41 % (ref 37–48.5)
HCT VFR BLD AUTO: 45.5 % (ref 37–48.5)
HCT VFR BLD AUTO: 51 % (ref 37–48.5)
HDLC SERPL-MCNC: 61 MG/DL (ref 40–75)
HDLC SERPL: 44.9 % (ref 20–50)
HGB BLD-MCNC: 12 G/DL (ref 12–16)
HGB BLD-MCNC: 13.2 G/DL (ref 12–16)
HGB BLD-MCNC: 14.9 G/DL (ref 12–16)
IMM GRANULOCYTES # BLD AUTO: 0.01 K/UL (ref 0–0.04)
IMM GRANULOCYTES # BLD AUTO: 0.02 K/UL (ref 0–0.04)
IMM GRANULOCYTES NFR BLD AUTO: 0.2 % (ref 0–0.5)
IMM GRANULOCYTES NFR BLD AUTO: 0.3 % (ref 0–0.5)
INTERVENTRICULAR SEPTUM: 1 CM (ref 0.6–1.1)
IVC DIAMETER: 1.7 CM
LDLC SERPL CALC-MCNC: 60.2 MG/DL (ref 63–159)
LEFT INTERNAL DIMENSION IN SYSTOLE: 2.4 CM (ref 2.1–4)
LEFT VENTRICLE DIASTOLIC VOLUME INDEX: 38.22 ML/M2
LEFT VENTRICLE DIASTOLIC VOLUME: 58.1 ML
LEFT VENTRICLE END DIASTOLIC VOLUME APICAL 2 CHAMBER: 35.5 ML
LEFT VENTRICLE END DIASTOLIC VOLUME APICAL 4 CHAMBER: 59.2 ML
LEFT VENTRICLE MASS INDEX: 85 G/M2
LEFT VENTRICLE SYSTOLIC VOLUME INDEX: 13.3 ML/M2
LEFT VENTRICLE SYSTOLIC VOLUME: 20.2 ML
LEFT VENTRICULAR INTERNAL DIMENSION IN DIASTOLE: 3.7 CM (ref 3.5–6)
LEFT VENTRICULAR MASS: 129.33 G
LVED V (TEICH): 58.1 ML
LVES V (TEICH): 20.2 ML
LVOT MG: 2 MMHG
LVOT MV: 0.63 CM/S
LYMPHOCYTES # BLD AUTO: 1 K/UL (ref 1–4.8)
LYMPHOCYTES # BLD AUTO: 1.8 K/UL (ref 1–4.8)
LYMPHOCYTES NFR BLD: 17.3 % (ref 18–48)
LYMPHOCYTES NFR BLD: 29.8 % (ref 18–48)
MAGNESIUM SERPL-MCNC: 2.3 MG/DL (ref 1.6–2.6)
MCH RBC QN AUTO: 26 PG (ref 27–31)
MCH RBC QN AUTO: 26.1 PG (ref 27–31)
MCH RBC QN AUTO: 26.2 PG (ref 27–31)
MCHC RBC AUTO-ENTMCNC: 29 G/DL (ref 32–36)
MCHC RBC AUTO-ENTMCNC: 29.2 G/DL (ref 32–36)
MCHC RBC AUTO-ENTMCNC: 29.3 G/DL (ref 32–36)
MCV RBC AUTO: 89 FL (ref 82–98)
MCV RBC AUTO: 89 FL (ref 82–98)
MCV RBC AUTO: 91 FL (ref 82–98)
MONOCYTES # BLD AUTO: 0.8 K/UL (ref 0.3–1)
MONOCYTES # BLD AUTO: 0.9 K/UL (ref 0.3–1)
MONOCYTES NFR BLD: 13.6 % (ref 4–15)
MONOCYTES NFR BLD: 15.6 % (ref 4–15)
MV MEAN GRADIENT: 9 MMHG
MV PEAK A VEL: 1.1 M/S
MV PEAK E VEL: 2.09 M/S
MV PEAK GRADIENT: 20 MMHG
MV VALVE AREA BY CONTINUITY EQUATION: 0.69 CM2
NEUTROPHILS # BLD AUTO: 2.6 K/UL (ref 1.8–7.7)
NEUTROPHILS # BLD AUTO: 3.4 K/UL (ref 1.8–7.7)
NEUTROPHILS NFR BLD: 44.8 % (ref 38–73)
NEUTROPHILS NFR BLD: 56.6 % (ref 38–73)
NONHDLC SERPL-MCNC: 75 MG/DL
NRBC BLD-RTO: 0 /100 WBC
NRBC BLD-RTO: 0 /100 WBC
OHS LV EJECTION FRACTION SIMPSONS BIPLANE MOD: 64 %
PISA MRMAX VEL: 4.34 M/S
PISA TR MAX VEL: 3.3 M/S
PLATELET # BLD AUTO: 165 K/UL (ref 150–450)
PLATELET # BLD AUTO: 170 K/UL (ref 150–450)
PLATELET # BLD AUTO: 193 K/UL (ref 150–450)
PMV BLD AUTO: 10.1 FL (ref 9.2–12.9)
PMV BLD AUTO: 9.7 FL (ref 9.2–12.9)
PMV BLD AUTO: 9.9 FL (ref 9.2–12.9)
POTASSIUM SERPL-SCNC: 4.6 MMOL/L (ref 3.5–5.1)
POTASSIUM SERPL-SCNC: 5.4 MMOL/L (ref 3.5–5.1)
POTASSIUM SERPL-SCNC: 6.5 MMOL/L (ref 3.5–5.1)
PROT SERPL-MCNC: 6.9 G/DL (ref 6–8.4)
PROT SERPL-MCNC: 7.6 G/DL (ref 6–8.4)
PROT SERPL-MCNC: 8 G/DL (ref 6–8.4)
PV MV: 0.65 M/S
PV PEAK GRADIENT: 4 MMHG
PV PEAK VELOCITY: 1 M/S
RBC # BLD AUTO: 4.62 M/UL (ref 4–5.4)
RBC # BLD AUTO: 5.03 M/UL (ref 4–5.4)
RBC # BLD AUTO: 5.71 M/UL (ref 4–5.4)
SODIUM SERPL-SCNC: 138 MMOL/L (ref 136–145)
SODIUM SERPL-SCNC: 140 MMOL/L (ref 136–145)
SODIUM SERPL-SCNC: 142 MMOL/L (ref 136–145)
TR MAX PG: 44 MMHG
TRIGL SERPL-MCNC: 74 MG/DL (ref 30–150)
TROPONIN I SERPL HS-MCNC: 39.6 PG/ML (ref 0–14.9)
TROPONIN I SERPL HS-MCNC: 52.2 PG/ML (ref 0–14.9)
TROPONIN I SERPL HS-MCNC: 56.7 PG/ML (ref 0–14.9)
TROPONIN I SERPL HS-MCNC: 61.9 PG/ML (ref 0–14.9)
TSH SERPL DL<=0.005 MIU/L-ACNC: 1.15 UIU/ML (ref 0.34–5.6)
WBC # BLD AUTO: 5.84 K/UL (ref 3.9–12.7)
WBC # BLD AUTO: 5.87 K/UL (ref 3.9–12.7)
WBC # BLD AUTO: 5.91 K/UL (ref 3.9–12.7)
Z-SCORE OF LEFT VENTRICULAR DIMENSION IN END DIASTOLE: -1.85
Z-SCORE OF LEFT VENTRICULAR DIMENSION IN END SYSTOLE: -1.12

## 2024-06-28 PROCEDURE — 99900031 HC PATIENT EDUCATION (STAT)

## 2024-06-28 PROCEDURE — 82962 GLUCOSE BLOOD TEST: CPT

## 2024-06-28 PROCEDURE — 93306 TTE W/DOPPLER COMPLETE: CPT

## 2024-06-28 PROCEDURE — 80053 COMPREHEN METABOLIC PANEL: CPT | Mod: 91 | Performed by: NURSE PRACTITIONER

## 2024-06-28 PROCEDURE — 96365 THER/PROPH/DIAG IV INF INIT: CPT

## 2024-06-28 PROCEDURE — 94640 AIRWAY INHALATION TREATMENT: CPT

## 2024-06-28 PROCEDURE — 36415 COLL VENOUS BLD VENIPUNCTURE: CPT | Performed by: STUDENT IN AN ORGANIZED HEALTH CARE EDUCATION/TRAINING PROGRAM

## 2024-06-28 PROCEDURE — 93306 TTE W/DOPPLER COMPLETE: CPT | Mod: 26,,, | Performed by: INTERNAL MEDICINE

## 2024-06-28 PROCEDURE — 21400001 HC TELEMETRY ROOM

## 2024-06-28 PROCEDURE — 36415 COLL VENOUS BLD VENIPUNCTURE: CPT | Performed by: NURSE PRACTITIONER

## 2024-06-28 PROCEDURE — 25000003 PHARM REV CODE 250: Performed by: STUDENT IN AN ORGANIZED HEALTH CARE EDUCATION/TRAINING PROGRAM

## 2024-06-28 PROCEDURE — 83036 HEMOGLOBIN GLYCOSYLATED A1C: CPT | Performed by: STUDENT IN AN ORGANIZED HEALTH CARE EDUCATION/TRAINING PROGRAM

## 2024-06-28 PROCEDURE — 90935 HEMODIALYSIS ONE EVALUATION: CPT

## 2024-06-28 PROCEDURE — 99900035 HC TECH TIME PER 15 MIN (STAT)

## 2024-06-28 PROCEDURE — 63600175 PHARM REV CODE 636 W HCPCS: Performed by: NURSE PRACTITIONER

## 2024-06-28 PROCEDURE — 63600175 PHARM REV CODE 636 W HCPCS: Performed by: EMERGENCY MEDICINE

## 2024-06-28 PROCEDURE — 85025 COMPLETE CBC W/AUTO DIFF WBC: CPT | Performed by: EMERGENCY MEDICINE

## 2024-06-28 PROCEDURE — 99223 1ST HOSP IP/OBS HIGH 75: CPT | Mod: ,,, | Performed by: STUDENT IN AN ORGANIZED HEALTH CARE EDUCATION/TRAINING PROGRAM

## 2024-06-28 PROCEDURE — 80053 COMPREHEN METABOLIC PANEL: CPT | Performed by: EMERGENCY MEDICINE

## 2024-06-28 PROCEDURE — 93010 ELECTROCARDIOGRAM REPORT: CPT | Mod: ,,, | Performed by: GENERAL PRACTICE

## 2024-06-28 PROCEDURE — 5A1D70Z PERFORMANCE OF URINARY FILTRATION, INTERMITTENT, LESS THAN 6 HOURS PER DAY: ICD-10-PCS | Performed by: INTERNAL MEDICINE

## 2024-06-28 PROCEDURE — 25000003 PHARM REV CODE 250: Performed by: EMERGENCY MEDICINE

## 2024-06-28 PROCEDURE — 83735 ASSAY OF MAGNESIUM: CPT | Performed by: EMERGENCY MEDICINE

## 2024-06-28 PROCEDURE — 80053 COMPREHEN METABOLIC PANEL: CPT | Mod: 91 | Performed by: STUDENT IN AN ORGANIZED HEALTH CARE EDUCATION/TRAINING PROGRAM

## 2024-06-28 PROCEDURE — 93005 ELECTROCARDIOGRAM TRACING: CPT | Performed by: GENERAL PRACTICE

## 2024-06-28 PROCEDURE — 84484 ASSAY OF TROPONIN QUANT: CPT | Mod: 91 | Performed by: STUDENT IN AN ORGANIZED HEALTH CARE EDUCATION/TRAINING PROGRAM

## 2024-06-28 PROCEDURE — 25000003 PHARM REV CODE 250: Performed by: NURSE PRACTITIONER

## 2024-06-28 PROCEDURE — 94761 N-INVAS EAR/PLS OXIMETRY MLT: CPT

## 2024-06-28 PROCEDURE — 84443 ASSAY THYROID STIM HORMONE: CPT | Performed by: STUDENT IN AN ORGANIZED HEALTH CARE EDUCATION/TRAINING PROGRAM

## 2024-06-28 PROCEDURE — 84484 ASSAY OF TROPONIN QUANT: CPT | Performed by: EMERGENCY MEDICINE

## 2024-06-28 PROCEDURE — 83880 ASSAY OF NATRIURETIC PEPTIDE: CPT | Performed by: EMERGENCY MEDICINE

## 2024-06-28 PROCEDURE — 85027 COMPLETE CBC AUTOMATED: CPT | Performed by: NURSE PRACTITIONER

## 2024-06-28 PROCEDURE — 80061 LIPID PANEL: CPT | Performed by: STUDENT IN AN ORGANIZED HEALTH CARE EDUCATION/TRAINING PROGRAM

## 2024-06-28 PROCEDURE — 84484 ASSAY OF TROPONIN QUANT: CPT | Mod: 91 | Performed by: NURSE PRACTITIONER

## 2024-06-28 PROCEDURE — 25000003 PHARM REV CODE 250: Performed by: INTERNAL MEDICINE

## 2024-06-28 PROCEDURE — 85025 COMPLETE CBC W/AUTO DIFF WBC: CPT | Mod: 91 | Performed by: STUDENT IN AN ORGANIZED HEALTH CARE EDUCATION/TRAINING PROGRAM

## 2024-06-28 PROCEDURE — 96375 TX/PRO/DX INJ NEW DRUG ADDON: CPT

## 2024-06-28 RX ORDER — ACETAMINOPHEN 325 MG/1
650 TABLET ORAL EVERY 8 HOURS PRN
Status: DISCONTINUED | OUTPATIENT
Start: 2024-06-28 | End: 2024-07-06 | Stop reason: HOSPADM

## 2024-06-28 RX ORDER — IBUPROFEN 200 MG
24 TABLET ORAL
Status: DISCONTINUED | OUTPATIENT
Start: 2024-06-28 | End: 2024-07-06 | Stop reason: HOSPADM

## 2024-06-28 RX ORDER — LANOLIN ALCOHOL/MO/W.PET/CERES
800 CREAM (GRAM) TOPICAL
Status: DISCONTINUED | OUTPATIENT
Start: 2024-06-28 | End: 2024-07-06 | Stop reason: HOSPADM

## 2024-06-28 RX ORDER — DEXTROSE MONOHYDRATE 100 MG/ML
INJECTION, SOLUTION INTRAVENOUS CONTINUOUS
Status: DISCONTINUED | OUTPATIENT
Start: 2024-06-28 | End: 2024-06-28

## 2024-06-28 RX ORDER — MUPIROCIN 20 MG/G
OINTMENT TOPICAL 2 TIMES DAILY
Status: DISCONTINUED | OUTPATIENT
Start: 2024-06-28 | End: 2024-06-29

## 2024-06-28 RX ORDER — SODIUM,POTASSIUM PHOSPHATES 280-250MG
2 POWDER IN PACKET (EA) ORAL
Status: DISCONTINUED | OUTPATIENT
Start: 2024-06-28 | End: 2024-07-06 | Stop reason: HOSPADM

## 2024-06-28 RX ORDER — IBUPROFEN 200 MG
16 TABLET ORAL
Status: DISCONTINUED | OUTPATIENT
Start: 2024-06-28 | End: 2024-07-06 | Stop reason: HOSPADM

## 2024-06-28 RX ORDER — SODIUM CHLORIDE 0.9 % (FLUSH) 0.9 %
10 SYRINGE (ML) INJECTION
Status: DISCONTINUED | OUTPATIENT
Start: 2024-06-28 | End: 2024-07-06 | Stop reason: HOSPADM

## 2024-06-28 RX ORDER — ASPIRIN 325 MG
325 TABLET ORAL
Status: COMPLETED | OUTPATIENT
Start: 2024-06-28 | End: 2024-06-28

## 2024-06-28 RX ORDER — ONDANSETRON 4 MG/1
4 TABLET, ORALLY DISINTEGRATING ORAL EVERY 6 HOURS PRN
Status: DISCONTINUED | OUTPATIENT
Start: 2024-06-28 | End: 2024-06-28

## 2024-06-28 RX ORDER — TALC
6 POWDER (GRAM) TOPICAL NIGHTLY PRN
Status: DISCONTINUED | OUTPATIENT
Start: 2024-06-28 | End: 2024-07-06 | Stop reason: HOSPADM

## 2024-06-28 RX ORDER — HEPARIN SODIUM 5000 [USP'U]/ML
5000 INJECTION, SOLUTION INTRAVENOUS; SUBCUTANEOUS EVERY 8 HOURS
Status: DISCONTINUED | OUTPATIENT
Start: 2024-06-28 | End: 2024-06-28

## 2024-06-28 RX ORDER — ALBUTEROL SULFATE 0.83 MG/ML
10 SOLUTION RESPIRATORY (INHALATION) CONTINUOUS
Status: DISCONTINUED | OUTPATIENT
Start: 2024-06-28 | End: 2024-06-28

## 2024-06-28 RX ORDER — CALCIUM GLUCONATE 20 MG/ML
1 INJECTION, SOLUTION INTRAVENOUS
Status: COMPLETED | OUTPATIENT
Start: 2024-06-28 | End: 2024-06-28

## 2024-06-28 RX ORDER — ASPIRIN 81 MG/1
81 TABLET ORAL DAILY
Status: DISCONTINUED | OUTPATIENT
Start: 2024-06-28 | End: 2024-07-02

## 2024-06-28 RX ORDER — HYDRALAZINE HYDROCHLORIDE 20 MG/ML
10 INJECTION INTRAMUSCULAR; INTRAVENOUS EVERY 8 HOURS PRN
Status: DISCONTINUED | OUTPATIENT
Start: 2024-06-28 | End: 2024-07-06 | Stop reason: HOSPADM

## 2024-06-28 RX ORDER — ONDANSETRON HYDROCHLORIDE 2 MG/ML
8 INJECTION, SOLUTION INTRAVENOUS EVERY 8 HOURS PRN
Status: DISCONTINUED | OUTPATIENT
Start: 2024-06-28 | End: 2024-07-06 | Stop reason: HOSPADM

## 2024-06-28 RX ORDER — DEXTROSE 50 % IN WATER (D50W) INTRAVENOUS SYRINGE
25
Status: COMPLETED | OUTPATIENT
Start: 2024-06-28 | End: 2024-06-28

## 2024-06-28 RX ORDER — ATORVASTATIN CALCIUM 40 MG/1
40 TABLET, FILM COATED ORAL NIGHTLY
Status: DISCONTINUED | OUTPATIENT
Start: 2024-06-28 | End: 2024-07-02

## 2024-06-28 RX ORDER — ISOSORBIDE MONONITRATE 30 MG/1
30 TABLET, EXTENDED RELEASE ORAL DAILY
Status: DISCONTINUED | OUTPATIENT
Start: 2024-06-29 | End: 2024-07-02

## 2024-06-28 RX ORDER — GLUCAGON 1 MG
1 KIT INJECTION
Status: DISCONTINUED | OUTPATIENT
Start: 2024-06-28 | End: 2024-07-06 | Stop reason: HOSPADM

## 2024-06-28 RX ADMIN — MUPIROCIN 1 G: 20 OINTMENT TOPICAL at 08:06

## 2024-06-28 RX ADMIN — DEXTROSE MONOHYDRATE 25 G: 25 INJECTION, SOLUTION INTRAVENOUS at 02:06

## 2024-06-28 RX ADMIN — ACETAMINOPHEN 650 MG: 325 TABLET ORAL at 02:06

## 2024-06-28 RX ADMIN — ASPIRIN 325 MG ORAL TABLET 325 MG: 325 PILL ORAL at 02:06

## 2024-06-28 RX ADMIN — ALBUTEROL SULFATE 10 MG: 0.83 SOLUTION RESPIRATORY (INHALATION) at 02:06

## 2024-06-28 RX ADMIN — ATORVASTATIN CALCIUM 40 MG: 40 TABLET, FILM COATED ORAL at 08:06

## 2024-06-28 RX ADMIN — DEXTROSE MONOHYDRATE 12.5 G: 25 INJECTION, SOLUTION INTRAVENOUS at 07:06

## 2024-06-28 RX ADMIN — INSULIN HUMAN 5 UNITS: 100 INJECTION, SOLUTION PARENTERAL at 02:06

## 2024-06-28 RX ADMIN — CALCIUM GLUCONATE 1 G: 20 INJECTION, SOLUTION INTRAVENOUS at 02:06

## 2024-06-28 RX ADMIN — HYDRALAZINE HYDROCHLORIDE 10 MG: 20 INJECTION INTRAMUSCULAR; INTRAVENOUS at 04:06

## 2024-06-28 RX ADMIN — ONDANSETRON 8 MG: 2 INJECTION INTRAMUSCULAR; INTRAVENOUS at 04:06

## 2024-06-28 NOTE — ASSESSMENT & PLAN NOTE
Secondary to dialysis and hypotension  rapid response called  Blood glucose 90   Hypotensive and blood rinsed back to patient with good resolve of symptoms.  Continue to monitor vital signs closely.

## 2024-06-28 NOTE — ASSESSMENT & PLAN NOTE
Creatine stable for now. BMP reviewed- noted Estimated Creatinine Clearance: 3.4 mL/min (A) (based on SCr of 9.2 mg/dL (H)). according to latest data. Based on current GFR, CKD stage is end stage.  Monitor UOP and serial BMP and adjust therapy as needed. Renally dose meds. Avoid nephrotoxic medications and procedures.  Nephrology consulted

## 2024-06-28 NOTE — NURSING
0730 Critical troponin reported to AHMET Hua NP. Pt NPO for stress, also scheduled for Dialysis today.          0854 Pt transported to dialysis via bed. Stress test on hold pending cardiology consult    1125 Pt transported back to room from dialysis. Stress test canceled. Lunch tray ordered. Vital signs in chart, pt denies pain or discomfort. No other needs at this time. Will continue to monitor.    1620 pt vomiting, shaking and moaning. Vital signs and glucose taken, manual BP taken, 202/90. SABINO Hua notified. IV zofran and IV hydralazine given. BP re-checked 1635 163/77. Will continue to monitor.

## 2024-06-28 NOTE — PLAN OF CARE
06/28/24 1149   WARD Message   Medicare Outpatient and Observation Notification regarding financial responsibility Given to patient/caregiver;Explained to patient/caregiver;Other (comments)   Date WARD was signed 06/28/24   Time WARD was signed 1030     Pt was explained WARD. Pt verbalized understanding of WARD and signed. WARD scanned to .

## 2024-06-28 NOTE — PROGRESS NOTES
[facility] Medicine  Progress Note    Patient Name: Tyra Isaac  MRN: [id number]  Patient Class: OP- Observation   Admission Date: 6/27/2024  Length of Stay: 0 days  Attending Physician: Fabián Altaimrano MD  Primary Care Provider: Kalpesh Goel MD        Subjective:     Principal Problem:Chest pain        HPI:  Patient is a 84-year-old female with history of atrial fibrillation on Eliquis, ESRD on dialysis Monday Wednesday Friday, CVA presents to emergency room with complaints of chest discomfort.  Patient reports chest discomfort started earlier today lasted about 2 hours, describes it as tightness radiating from the right to left side.  Resolved prior to coming to the emergency room.  Patient reports similar symptoms in the past.  Did have episode of emesis.  Potassium 6.5 on admission, ED physician spoke with Nephrology who ordered stat dialysis, ED physician placed hyperkalemia protocol.  Troponin 39.6.  Patient being admitted for chest pain workup.    Overview/Hospital Course:  No notes on file    Interval History:  Patient seen and examined on dialysis.  No further chest pain since admission.  Reports that her chest felt tight she was short of breath with radiation of the pain to her left chest.  She reports the pain had lasted approximately 2 hours.  Discussed plan of care with patient and answered all questions.    Update:  Rapid response called while patient was on dialysis and Dr. Valero at the bedside.  Patient had syncopal episode with brief hypotension with systolic in 80's.  Her midodrine had been held earlier secondary to HTN..  Her blood was rinsed back and she woke up.  She was appropriate and cognitively intact.  I re-evaluated patient.  She reports feeling presyncope during her dialysis.  She is back to baseline now.  She denied any chest pain or shortness of breath.    Review of Systems   Respiratory:  Positive for chest tightness.    Cardiovascular:  Positive for  chest pain.   All other systems reviewed and are negative.    Objective:     Vital Signs (Most Recent):  Temp: 97.5 °F (36.4 °C) (06/28/24 0905)  Pulse: 68 (06/28/24 1116)  Resp: 16 (06/28/24 1116)  BP: 139/70 (06/28/24 1116)  SpO2: (!) 93 % (06/28/24 0815) Vital Signs (24h Range):  Temp:  [97.5 °F (36.4 °C)-98.2 °F (36.8 °C)] 97.5 °F (36.4 °C)  Pulse:  [] 68  Resp:  [16-20] 16  SpO2:  [93 %-100 %] 93 %  BP: ()/() 139/70     Weight: 48.7 kg (107 lb 5.8 oz)  Body mass index is 17.87 kg/m².    Intake/Output Summary (Last 24 hours) at 6/28/2024 1130  Last data filed at 6/28/2024 0856  Gross per 24 hour   Intake 0 ml   Output --   Net 0 ml         Physical Exam  Constitutional:       General: She is not in acute distress.     Appearance: She is ill-appearing (appears chronically ill). She is not toxic-appearing or diaphoretic.      Comments: Frail and cachectic   HENT:      Head: Normocephalic and atraumatic.   Cardiovascular:      Rate and Rhythm: Normal rate and regular rhythm.      Pulses: Normal pulses.      Heart sounds: Normal heart sounds.      Comments: Right upper arm fistula presently accessed for dialysis.  Pulmonary:      Effort: Pulmonary effort is normal. No respiratory distress.      Breath sounds: Normal breath sounds.   Abdominal:      General: Bowel sounds are normal. There is no distension.      Palpations: Abdomen is soft.      Tenderness: There is no abdominal tenderness. There is no guarding.   Musculoskeletal:         General: Normal range of motion.      Right lower leg: No edema.      Left lower leg: No edema.   Skin:     General: Skin is warm and dry.      Coloration: Skin is not jaundiced.   Neurological:      Mental Status: She is alert and oriented to person, place, and time. Mental status is at baseline.             Significant Labs: All pertinent labs within the past 24 hours have been reviewed.  CBC:   Recent Labs   Lab 06/28/24  0018 06/28/24  0553   WBC 5.91 5.87   HGB  13.2 12.0   HCT 45.5 41.0    165     CMP:   Recent Labs   Lab 06/28/24  0018 06/28/24  0552    142   K 6.5* 5.4*   CL 98 99   CO2 30* 30*   GLU 90 54*   BUN 56* 58*   CREATININE 9.2* 9.7*   CALCIUM 9.6 9.5   PROT 7.6 6.9   ALBUMIN 4.3 3.8   BILITOT 0.4 0.4   ALKPHOS 64 58   AST 21 17   ALT 16 13   ANIONGAP 12 13     Magnesium:   Recent Labs   Lab 06/28/24  0018   MG 2.3     Troponin:   Recent Labs   Lab 06/28/24  0018 06/28/24  0553   TROPONINIHS 39.6* 61.9*       Significant Imaging: I have reviewed all pertinent imaging results/findings within the past 24 hours.    Assessment/Plan:      * Chest pain  Troponin 39.6 --> 61.9  Follow up echocardiogram   Stress test ordered - will obtain after stat dialysis  Started on aspirin daily  Follows with Dr. Thompson outpatient - will consult cardiology  Follow up lipid panel - reviewed, TSH - wnl        Hyperkalemia  This patient has hyperkalemia which is uncontrolled. We will monitor for arrhythmias with EKG or continuous telemetry. We will treat the hyperkalemia with IV insulin and dextrose and Nebulized albuterol sulfate. The likely etiology of the hyperkalemia is ESRD.  The patients latest potassium has been reviewed and the results are listed below  Recent Labs   Lab 06/28/24  0552   K 5.4*     Stat dialysis today          ESRD (end stage renal disease)  Creatine stable for now. BMP reviewed- noted Estimated Creatinine Clearance: 3.3 mL/min (A) (based on SCr of 9.7 mg/dL (H)). according to latest data. Based on current GFR, CKD stage is end stage.  Monitor UOP and serial BMP and adjust therapy as needed. Renally dose meds. Avoid nephrotoxic medications and procedures.  Nephrology consulted    Atrial fibrillation  On Eliquis and Multaq outpatient - continue    Syncope and collapse  Secondary to dialysis and hypotension  rapid response called  Blood glucose 90   Hypotensive and blood rinsed back to patient with good resolve of symptoms.  Continue to monitor  vital signs closely.        VTE Risk Mitigation (From admission, onward)           Ordered     apixaban tablet 2.5 mg  2 times daily         06/28/24 0207     IP VTE HIGH RISK PATIENT  Once         06/28/24 0207     Place sequential compression device  Until discontinued         06/28/24 0207                    Discharge Planning   ILAN: 6/30/2024     Code Status: Full Code   Is the patient medically ready for discharge?:     Reason for patient still in hospital (select all that apply): Patient trending condition, Laboratory test, Treatment, Imaging, and Consult recommendations  Discharge Plan A: Home with family                  Maryjane Hua NP  Department of Hospital Medicine   [facility]

## 2024-06-28 NOTE — PROGRESS NOTES
Pt became unresponsive 90 min into tx  BP 86/44  Blood reinfused, 250 mL NS administered, did not immediately respond, RRT called  Pt responded     VSS, BG 90, pt responsive, Dr. Valero BS  Orders received to terminate tx for today    Net  mL       06/28/24 1125   Handoff Report   Received From Uyen Schneider   Given To Azul   Vital Signs   Temp 97.8 °F (36.6 °C)   Temp Source Oral   Pulse 68   Resp 16   /70   BP Location Left arm   BP Method Automatic   Patient Position Lying   Assessments (Pre/Post)   Blood Liters Processed (BLP) 28   Transport Modality bed   Level of Consciousness (AVPU) alert   Dialyzer Clearance mildly streaked   Post-Hemodialysis Assessment   Rinseback Volume (mL) 250 mL   Blood Volume Processed (Liters) 28 L   Duration of Treatment 90 minutes   Additional Fluid Intake (mL) 750 mL   Total UF (mL) 1315 mL   Net Fluid Removal 665   Patient Response to Treatment Did not tolerate well   Post-Treatment Weight 48 kg (105 lb 13.1 oz)   Treatment Weight Change -0.7   Arterial bleeding stop time (min) 7 min   Venous bleeding stop time (min) 7 min   Post-Hemodialysis Comments Tx stopped d/t hypotensive episode per MD, pt stable

## 2024-06-28 NOTE — ASSESSMENT & PLAN NOTE
This patient has hyperkalemia which is uncontrolled. We will monitor for arrhythmias with EKG or continuous telemetry. We will treat the hyperkalemia with IV insulin and dextrose and Nebulized albuterol sulfate. The likely etiology of the hyperkalemia is ESRD.  The patients latest potassium has been reviewed and the results are listed below  Recent Labs   Lab 06/28/24  0552   K 5.4*     Stat dialysis today

## 2024-06-28 NOTE — CONSULTS
[facility]  Department of Cardiology  Consult Note      PATIENT NAME: Tyra Isaac  MRN: [id number]  TODAY'S DATE: 06/28/2024  ADMIT DATE: 6/27/2024                          CONSULT REQUESTED BY: Fabián Altamirano MD    SUBJECTIVE     PRINCIPAL PROBLEM: Chest pain      REASON FOR CONSULT:  Chest pain, elevated troponin      HPI:    Patient is a 84-year-old female with history of atrial fibrillation on Eliquis, ESRD on dialysis Monday Wednesday Friday, CVA presents to emergency room with complaints of chest tightness x2 hours prior to arrival.  Chest pain resolved in ED but reports associated shortness of breath, nausea, vomiting, and dizziness when CP was occuring.  Hyperkalemia in ED with K of 6.5.  Nephrology consulted, stat dialysis performed.     Trivially elevated troponin HS.  39 > 61.  Chronically elevated troponin HS likely due to ESRD on HD. No acute ST-T wave changes.      During examination, patient is resting in bed.  Dialysis performed. Hypertensive this afternoon. SR on tele.  C/o HA.  CP improved.      ECHO 1/5/24    Left Ventricle: The left ventricle is normal in size. Normal wall thickness. Normal wall motion. There is normal systolic function with a visually estimated ejection fraction of 60 - 65%. There is diastolic dysfunction.    Right Ventricle: Normal right ventricular cavity size. Wall thickness is normal. Right ventricle wall motion  is normal. Systolic function is normal.    Left Atrium: Left atrium is severely dilated.    Mitral Valve: There is moderate bileaflet sclerosis. There is moderate mitral annular calcification present. There is mild to moderate regurgitation with a centrally directed jet.    Tricuspid Valve: There is mild to moderate regurgitation.    Pulmonary Artery: There is mild pulmonary hypertension. The estimated pulmonary artery systolic pressure is 41 mmHg.    IVC/SVC: Normal venous pressure at 3 mmHg.    CATH 2/7/22  The left ventricular systolic  function was normal.  The left ventricular end diastolic pressure was elevated.  The pre-procedure left ventricular end diastolic pressure was 20.  The post-procedure left ventricular end diastolic pressure was 31.  The ejection fraction was calculated to be 60%.  The estimated blood loss was none.  Left main is patent, lad patent moderate to severe tortuosity.  Left circumflex artery proximal stent is patent moderate to severe tortuosity, ramus intermedius is patent.  RCA is small caliber is patent moderate tortuosity. Non dominant vessel.        From H&P   Chest Pain       Pt says she has been having pain in the center of her chest for the last two hours.  Pt sad she threw up prior to the pain.  Pt has hx of afib         HPI: Patient is a 84-year-old female with history of atrial fibrillation on Eliquis, ESRD on dialysis Monday Wednesday Friday, CVA presents to emergency room with complaints of chest discomfort.  Patient reports chest discomfort started earlier today lasted about 2 hours, describes it as tightness radiating from the right to left side.  Resolved prior to coming to the emergency room.  Patient reports similar symptoms in the past.  Did have episode of emesis.  Potassium 6.5 on admission, ED physician spoke with Nephrology who ordered stat dialysis, ED physician placed hyperkalemia protocol.  Troponin 39.6.  Patient being admitted for chest pain workup.      Review of patient's allergies indicates:   Allergen Reactions    Cyclobenzaprine     Fish containing products Hives    Peanut Other (See Comments)    Tramadol Itching       Past Medical History:   Diagnosis Date    A-fib     Anxiety     Depression     Disorder of kidney and ureter     Encephalopathy acute 1/1/2018    End stage kidney disease 6/17/2017    Gout     Hyperlipidemia     Hypertension     Moderate episode of recurrent major depressive disorder 1/17/2018    Nephropathy hypertensive, stage 5 chronic kidney disease or end stage renal  disease 6/17/2017    Obstructive pattern present on pulmonary function testing 7/28/2021    Shows moderate obstruction.    Osteopenia of multiple sites 3/9/2018    Based upon bone density measurements. Patient also has chronic kidney disease.    Stroke 11/2016     Past Surgical History:   Procedure Laterality Date    ANGIOGRAM, CORONARY, WITH LEFT HEART CATHETERIZATION N/A 2/7/2022    Procedure: Angiogram, Coronary, with Left Heart Cath;  Surgeon: Gino Leal MD;  Location: [facility] CATH/EP LAB;  Service: Cardiology;  Laterality: N/A;    CARDIAC SURGERY      stents    EYE SURGERY      WRIST SURGERY       Social History     Tobacco Use    Smoking status: Never    Smokeless tobacco: Never   Substance Use Topics    Alcohol use: No    Drug use: No        REVIEW OF SYSTEMS    As mentioned in HPI    OBJECTIVE     VITAL SIGNS (Most Recent)  Temp: 98 °F (36.7 °C) (06/28/24 1215)  Pulse: 74 (06/28/24 1215)  Resp: 16 (06/28/24 1125)  BP: (!) 172/72 (06/28/24 1215)  SpO2: (!) 94 % (06/28/24 1215)    VENTILATION STATUS  Resp: 16 (06/28/24 1125)  SpO2: (!) 94 % (06/28/24 1215)           I & O (Last 24H):  Intake/Output Summary (Last 24 hours) at 6/28/2024 1331  Last data filed at 6/28/2024 1125  Gross per 24 hour   Intake 750 ml   Output 1315 ml   Net -565 ml       WEIGHTS  Wt Readings from Last 3 Encounters:   06/28/24 0300 48.7 kg (107 lb 5.8 oz)   06/27/24 2343 47.6 kg (105 lb)   06/06/24 0809 46.7 kg (103 lb)   06/03/24 1113 45.1 kg (99 lb 6.8 oz)   06/03/24 1103 49 kg (108 lb)       PHYSICAL EXAM    CONSTITUTIONAL: NAD, frail chronically ill appearing elderly female  HEENT: Normocephalic. No pallor  NECK: no JVD  LUNGS: CTA b/l  HEART: regular rate and rhythm, S1, S2 normal, no murmur   ABDOMEN: soft, non-tender, bowel sounds normal  EXTREMITIES: No edema  SKIN: No rash  NEURO: AAO X 3  PSYCH: normal affect      HOME MEDICATIONS:  No current facility-administered medications on file prior to encounter.     Current  Outpatient Medications on File Prior to Encounter   Medication Sig Dispense Refill    atorvastatin (LIPITOR) 40 MG tablet Take 40 mg by mouth once daily.      b complex vitamins tablet Take 1 tablet by mouth once daily.      calcium acetate,phosphat bind, (PHOSLO) 667 mg capsule Take 667 mg by mouth 3 (three) times daily with meals.      dorzolamide-timolol 2-0.5% (COSOPT) 22.3-6.8 mg/mL ophthalmic solution Place 1 drop into both eyes 2 (two) times daily.      dronedarone (MULTAQ) 400 mg Tab Take 1 tablet (400 mg total) by mouth 2 (two) times daily with meals. 180 tablet 0    ELIQUIS 2.5 mg Tab Take 2.5 mg by mouth 2 (two) times daily.      latanoprost 0.005 % ophthalmic solution Place 1 drop into both eyes every evening.      loperamide (IMODIUM A-D) 2 mg Tab Take 1 tablet (2 mg total) by mouth 4 (four) times daily as needed (diarrhea). 3 tablet 0    midodrine (PROAMATINE) 10 MG tablet Take 1 tablet (10 mg total) by mouth 3 (three) times daily with meals. 270 tablet 0    ondansetron (ZOFRAN-ODT) 4 MG TbDL Take 1 tablet (4 mg total) by mouth every 6 (six) hours as needed (nausea). 30 tablet 5       SCHEDULED MEDS:   [START ON 6/29/2024] apixaban  2.5 mg Oral BID    aspirin  81 mg Oral Daily    atorvastatin  40 mg Oral QHS    dronedarone  400 mg Oral BID WM       CONTINUOUS INFUSIONS:    PRN MEDS:  Current Facility-Administered Medications:     acetaminophen, 650 mg, Oral, Q8H PRN    dextrose 50%, 12.5 g, Intravenous, PRN    dextrose 50%, 25 g, Intravenous, PRN    glucagon (human recombinant), 1 mg, Intramuscular, PRN    glucose, 16 g, Oral, PRN    glucose, 24 g, Oral, PRN    magnesium oxide, 800 mg, Oral, PRN    magnesium oxide, 800 mg, Oral, PRN    melatonin, 6 mg, Oral, Nightly PRN    potassium bicarbonate, 35 mEq, Oral, PRN    potassium bicarbonate, 50 mEq, Oral, PRN    potassium bicarbonate, 60 mEq, Oral, PRN    potassium, sodium phosphates, 2 packet, Oral, PRN    potassium, sodium phosphates, 2 packet, Oral,  "PRN    potassium, sodium phosphates, 2 packet, Oral, PRN    promethazine (PHENERGAN) 12.5 mg in 0.9% NaCl 50 mL IVPB, 12.5 mg, Intravenous, Q6H PRN    sodium chloride 0.9%, 10 mL, Intravenous, PRN    LABS AND DIAGNOSTICS     CBC LAST 3 DAYS  Recent Labs   Lab 06/28/24  0018 06/28/24  0553   WBC 5.91 5.87   RBC 5.03 4.62   HGB 13.2 12.0   HCT 45.5 41.0   MCV 91 89   MCH 26.2* 26.0*   MCHC 29.0* 29.3*   RDW 17.2* 17.1*    165   MPV 9.7 10.1   GRAN 56.6  3.4 44.8  2.6   LYMPH 17.3*  1.0 29.8  1.8   MONO 15.6*  0.9 13.6  0.8   BASO 0.02 0.02   NRBC 0 0       COAGULATION LAST 3 DAYS  No results for input(s): "LABPT", "INR", "APTT" in the last 168 hours.    CHEMISTRY LAST 3 DAYS  Recent Labs   Lab 06/28/24  0018 06/28/24  0552    142   K 6.5* 5.4*   CL 98 99   CO2 30* 30*   ANIONGAP 12 13   BUN 56* 58*   CREATININE 9.2* 9.7*   GLU 90 54*   CALCIUM 9.6 9.5   MG 2.3  --    ALBUMIN 4.3 3.8   PROT 7.6 6.9   ALKPHOS 64 58   ALT 16 13   AST 21 17   BILITOT 0.4 0.4       CARDIAC PROFILE LAST 3 DAYS  Recent Labs   Lab 06/28/24  0018 06/28/24  0553   *  --    TROPONINIHS 39.6* 61.9*       ENDOCRINE LAST 3 DAYS  Recent Labs   Lab 06/28/24  0553   TSH 1.151       LAST ARTERIAL BLOOD GAS  ABG  No results for input(s): "PH", "PO2", "PCO2", "HCO3", "BE" in the last 168 hours.    LAST 7 DAYS MICROBIOLOGY   Microbiology Results (last 7 days)       ** No results found for the last 168 hours. **            MOST RECENT IMAGING  X-Ray Chest AP Portable  Narrative: EXAMINATION:  XR CHEST AP PORTABLE    CLINICAL HISTORY:  Chest Pain;    TECHNIQUE:  Single frontal view of the chest was performed.    COMPARISON:  Prior examinations, most recent of which is March 19, 2024    FINDINGS:  Single portable chest view is submitted.  When accounting for position and technique and depth of inspiration the appearance of the cardiomediastinal silhouette is stable.  Aortic atherosclerotic change and mild tortuosity is " noted.    Mild accentuation of interstitial markings may relate to a pattern of mild interstitial infiltrate/edema.  There is no focal consolidation, there is no pleural effusion or pneumothorax.  Skin folds overlie the patient.    The osseous structures demonstrate chronic change.  Impression: Radiographic findings as above.    Electronically signed by: Aldo Espinosa  Date:    06/28/2024  Time:    01:10      ECHOCARDIOGRAM RESULTS (last 5)  Results for orders placed during the hospital encounter of 01/04/24    Echo    Interpretation Summary    Left Ventricle: The left ventricle is normal in size. Normal wall thickness. Normal wall motion. There is normal systolic function with a visually estimated ejection fraction of 60 - 65%. There is diastolic dysfunction.    Right Ventricle: Normal right ventricular cavity size. Wall thickness is normal. Right ventricle wall motion  is normal. Systolic function is normal.    Left Atrium: Left atrium is severely dilated.    Mitral Valve: There is moderate bileaflet sclerosis. There is moderate mitral annular calcification present. There is mild to moderate regurgitation with a centrally directed jet.    Tricuspid Valve: There is mild to moderate regurgitation.    Pulmonary Artery: There is mild pulmonary hypertension. The estimated pulmonary artery systolic pressure is 41 mmHg.    IVC/SVC: Normal venous pressure at 3 mmHg.      Results for orders placed during the hospital encounter of 03/20/23    Echo    Interpretation Summary  · The left ventricle is normal in size with moderate concentric hypertrophy and normal systolic function.  · The estimated ejection fraction is 57%.  · Normal left ventricular diastolic function.  · Normal right ventricular size with normal right ventricular systolic function.  · Moderate to severe tricuspid regurgitation.  · Mild-to-moderate mitral regurgitation.  · Moderate left atrial enlargement.  · Mild right atrial enlargement.  · There is mild  pulmonary hypertension.      Results for orders placed during the hospital encounter of 03/06/23    Echo    Interpretation Summary  · The left ventricle is normal in size with normal systolic function.  · The estimated ejection fraction is 60%.  · Grade II left ventricular diastolic dysfunction.  · Normal right ventricular size with normal right ventricular systolic function.  · Moderate left atrial enlargement.  · Mild-to-moderate mitral regurgitation.  · Moderate to severe tricuspid regurgitation.  · There is mild pulmonary hypertension.  · There is mild aortic valve stenosis.  · Aortic valve area is 1.78 cm2; peak velocity is 2.01 m/s; mean gradient is 9 mmHg.      Results for orders placed during the hospital encounter of 01/26/22    Echo    Interpretation Summary  · The left ventricle is normal in size with mild concentric hypertrophy and normal systolic function.  · The estimated ejection fraction is 65%.  · Grade II left ventricular diastolic dysfunction.  · Atrial fibrillation not observed.  · Normal right ventricular size with normal right ventricular systolic function.  · Moderate to severe left atrial enlargement.  · Mild right atrial enlargement.  · Mild mitral regurgitation.  · Mild to moderate tricuspid regurgitation.  · Normal central venous pressure (3 mmHg).  · The estimated PA systolic pressure is 36 mmHg.  · Mildly elevated gradient accross mitral valve of around 6 mmHg at HR of 64 bpm. MVA by pressure half time is normal, however this can be inaccurate.      Results for orders placed during the hospital encounter of 03/03/21    Echo Color Flow Doppler? Yes    Interpretation Summary  · The left ventricle is small with concentric remodeling and normal systolic function. The estimated ejection fraction is 63%  · The estimated PA systolic pressure is 38 mmHg.  · Grade II left ventricular diastolic dysfunction.  · Normal right ventricular size with normal right ventricular systolic function.  ·  Mild-to-moderate mitral regurgitation.  · Mild to moderate tricuspid regurgitation.  · Normal central venous pressure (3 mmHg).  · The patient converted from atrial fibrillation to normal sinus rhythm 03/03/2021      CURRENT/PREVIOUS VISIT EKG  Results for orders placed or performed during the hospital encounter of 06/27/24   EKG 12-lead    Collection Time: 06/28/24 12:35 AM   Result Value Ref Range    QRS Duration 76 ms    OHS QTC Calculation 482 ms    Narrative    Test Reason : R07.9,    Vent. Rate : 066 BPM     Atrial Rate : 066 BPM     P-R Int : 172 ms          QRS Dur : 076 ms      QT Int : 460 ms       P-R-T Axes : 075 036 -02 degrees     QTc Int : 482 ms    Normal sinus rhythm  Anteroseptal infarct (cited on or before 27-JUN-2024)  T wave abnormality, consider lateral ischemia  Abnormal ECG  When compared with ECG of 27-JUN-2024 23:43,  Serial changes of Anteroseptal infarct Present    Referred By: [email or web address]   SELF           Confirmed By:            ASSESSMENT/PLAN:     Active Hospital Problems    Diagnosis    *Chest pain    Hyperkalemia    ESRD (end stage renal disease)    Atrial fibrillation    Syncope and collapse       ASSESSMENT & PLAN:     Atypical Chest Pain  Hyperkalemia  Elevated troponin HS  ESRD on HD  PAF    RECOMMENDATIONS:    Patient presented to ED with c/o CP.  Associated n/v, dizziness, and SOB.  No acute ST-T wave changes.  Troponin trivially elevated.  Trend to peak.  Chronically elevated troponin likely secondary to ESRD.  Dialysis performed today.  Will perform nuclear stress test in am.  NPO at midnight. Will add Imdur 30 mg daily.  First dose tomorrow as patient is currently c/o acute HA.    Will defer to Nephrology for dialysis.  Appreciate recommendations and input.  Recommend optimizing antihypertensive regimen.   Continue Eliquis 2.5 mg BID.  Hx PAF.  SR on tele.  Continue dronedarone 400 mg BID.    Recent echo showed normal systolic and diastolic function with no acute valvular  abnormalities.   Thank you for the consult.  We will follow PRN.       Debbie Barney NP  Department of Cardiology  Date of Service: 06/28/2024

## 2024-06-28 NOTE — PLAN OF CARE
[facility]  Initial Discharge Assessment       Primary Care Provider: Kalpesh Goel MD    Admission Diagnosis: SOB (shortness of breath) [R06.02]    Admission Date: 6/27/2024  Expected Discharge Date: 6/30/2024   met with Pt at bedside to complete discharge assessment. . Demographics, PCP, and insurance verified. No home health. Pt has dialysis on MWF at [facility]. Pt reports ability to complete ADLs without assistance. Pt verbalized plan to discharge home via family transport. Pt has no other needs to be addressed at this time.  Transition of Care Barriers: None    Payor: [employer] MANAGED MEDICARE / Plan: [employer] MEDICARE HMO / Product Type: Capitation /     Extended Emergency Contact Information  Primary Emergency Contact: Raffi Isaac  Address: [address] [place], [place] [address] [place] [address]  Home Phone: [phone]  Mobile Phone: [phone]  Relation: Son   needed? No  Secondary Emergency Contact: Marcelo Isaac  Mobile Phone: [phone]  Relation: Son    Discharge Plan A: Home with family  Discharge Plan B: Home with family      [facility] [place], [place] - [address]  [address]  [facility] [address]  Phone: [phone] Fax: [phone]    [facility] [place] - [address]  [address] [place] [address]  Phone: [phone] Fax: [phone]      Initial Assessment (most recent)       Adult Discharge Assessment - 06/28/24 1133          Discharge Assessment    Assessment Type Discharge Planning Assessment     Confirmed/corrected address, phone number and insurance Yes     Confirmed Demographics Correct on Facesheet     Source of Information health record     Communicated ILAN with patient/caregiver Date not available/Unable to determine     Reason For Admission chest pain     People in Home child(aron), adult     Do you expect to return to your current living situation? Yes     Do you have  help at home or someone to help you manage your care at home? Yes     Walking or Climbing Stairs Difficulty yes     Walking or Climbing Stairs ambulation difficulty, requires equipment;stair climbing difficulty, requires equipment     Dressing/Bathing Difficulty yes     Home Accessibility wheelchair accessible     Home Layout Able to live on 1st floor     Equipment Currently Used at Home walker, rolling     Readmission within 30 days? No     Patient currently being followed by outpatient case management? No     Do you currently have service(s) that help you manage your care at home? No     Do you take prescription medications? Yes     Do you have prescription coverage? Yes     Do you have any problems affording any of your prescribed medications? No     Is the patient taking medications as prescribed? yes     How do you get to doctors appointments? family or friend will provide     Are you on dialysis? Yes     Dialysis Name and Scheduled days MWF [facility]     Do you take coumadin? No     Discharge Plan A Home with family     Discharge Plan B Home with family     Transition of Care Barriers None

## 2024-06-28 NOTE — HPI
Patient is a 84-year-old female with history of atrial fibrillation on Eliquis, ESRD on dialysis Monday Wednesday Friday, CVA presents to emergency room with complaints of chest discomfort.  Patient reports chest discomfort started earlier today lasted about 2 hours, describes it as tightness radiating from the right to left side.  Resolved prior to coming to the emergency room.  Patient reports similar symptoms in the past.  Did have episode of emesis.  Potassium 6.5 on admission, ED physician spoke with Nephrology who ordered stat dialysis, ED physician placed hyperkalemia protocol.  Troponin 39.6.  Patient being admitted for chest pain workup.

## 2024-06-28 NOTE — SUBJECTIVE & OBJECTIVE
Past Medical History:   Diagnosis Date    A-fib     Anxiety     Depression     Disorder of kidney and ureter     Encephalopathy acute 1/1/2018    End stage kidney disease 6/17/2017    Gout     Hyperlipidemia     Hypertension     Moderate episode of recurrent major depressive disorder 1/17/2018    Nephropathy hypertensive, 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    Osteopenia of multiple sites 3/9/2018    Based upon bone density measurements. Patient also has chronic kidney disease.    Stroke 11/2016       Past Surgical History:   Procedure Laterality Date    ANGIOGRAM, CORONARY, WITH LEFT HEART CATHETERIZATION N/A 2/7/2022    Procedure: Angiogram, Coronary, with Left Heart Cath;  Surgeon: Gino Leal MD;  Location: [facility] CATH/EP LAB;  Service: Cardiology;  Laterality: N/A;    CARDIAC SURGERY      stents    EYE SURGERY      WRIST SURGERY         Review of patient's allergies indicates:   Allergen Reactions    Cyclobenzaprine     Fish containing products Hives    Peanut Other (See Comments)    Tramadol Itching       No current facility-administered medications on file prior to encounter.     Current Outpatient Medications on File Prior to Encounter   Medication Sig    atorvastatin (LIPITOR) 40 MG tablet Take 40 mg by mouth once daily.    b complex vitamins tablet Take 1 tablet by mouth once daily.    calcium acetate,phosphat bind, (PHOSLO) 667 mg capsule Take 667 mg by mouth 3 (three) times daily with meals.    dorzolamide-timolol 2-0.5% (COSOPT) 22.3-6.8 mg/mL ophthalmic solution Place 1 drop into both eyes 2 (two) times daily.    dronedarone (MULTAQ) 400 mg Tab Take 1 tablet (400 mg total) by mouth 2 (two) times daily with meals.    ELIQUIS 2.5 mg Tab Take 2.5 mg by mouth 2 (two) times daily.    latanoprost 0.005 % ophthalmic solution Place 1 drop into both eyes every evening.     loperamide (IMODIUM A-D) 2 mg Tab Take 1 tablet (2 mg total) by mouth 4 (four) times daily as needed (diarrhea).    midodrine (PROAMATINE) 10 MG tablet Take 1 tablet (10 mg total) by mouth 3 (three) times daily with meals.    ondansetron (ZOFRAN-ODT) 4 MG TbDL Take 1 tablet (4 mg total) by mouth every 6 (six) hours as needed (nausea).     Family History       Problem Relation (Age of Onset)    Cancer Father    Heart disease Mother          Tobacco Use    Smoking status: Never    Smokeless tobacco: Never   Substance and Sexual Activity    Alcohol use: No    Drug use: No    Sexual activity: Not Currently     Review of Systems   Constitutional:  Negative for appetite change and fatigue.   HENT:  Negative for congestion.    Respiratory:  Positive for chest tightness. Negative for shortness of breath.    Cardiovascular:  Positive for chest pain. Negative for palpitations.   Gastrointestinal:  Negative for abdominal distention and abdominal pain.   Musculoskeletal:  Negative for arthralgias.   Neurological:  Negative for dizziness and numbness.   Psychiatric/Behavioral:  Negative for agitation.      Objective:     Vital Signs (Most Recent):  Temp: 98.1 °F (36.7 °C) (06/27/24 2343)  Pulse: 71 (06/28/24 0028)  Resp: 20 (06/28/24 0048)  BP: (!) 165/77 (06/28/24 0028)  SpO2: 100 % (06/28/24 0028) Vital Signs (24h Range):  Temp:  [98.1 °F (36.7 °C)] 98.1 °F (36.7 °C)  Pulse:  [71-81] 71  Resp:  [18-20] 20  SpO2:  [97 %-100 %] 100 %  BP: (139-165)/(73-77) 165/77     Weight: 47.6 kg (105 lb)  Body mass index is 17.21 kg/m².     Physical Exam  Constitutional:       General: She is not in acute distress.     Appearance: She is not ill-appearing.   HENT:      Head: Atraumatic.   Cardiovascular:      Rate and Rhythm: Normal rate and regular rhythm.   Pulmonary:      Effort: No respiratory distress.      Breath sounds: No wheezing.   Abdominal:      General: There is no distension.      Tenderness: There is no abdominal tenderness.    Musculoskeletal:         General: No swelling.   Skin:     General: Skin is dry.   Neurological:      General: No focal deficit present.      Mental Status: She is alert and oriented to person, place, and time.                Significant Labs: All pertinent labs within the past 24 hours have been reviewed.  CBC:   Recent Labs   Lab 06/28/24  0018   WBC 5.91   HGB 13.2   HCT 45.5        CMP:   Recent Labs   Lab 06/28/24  0018      K 6.5*   CL 98   CO2 30*   GLU 90   BUN 56*   CREATININE 9.2*   CALCIUM 9.6   PROT 7.6   ALBUMIN 4.3   BILITOT 0.4   ALKPHOS 64   AST 21   ALT 16   ANIONGAP 12       Significant Imaging: I have reviewed all pertinent imaging results/findings within the past 24 hours.

## 2024-06-28 NOTE — ASSESSMENT & PLAN NOTE
Troponin 39.6, trend troponin   Follow up echocardiogram   Stress test ordered   Started on aspirin daily  Follows with Dr. Thompson outpatient  Follow up lipid panel, TSH

## 2024-06-28 NOTE — PLAN OF CARE
Problem: Adult Inpatient Plan of Care  Goal: Absence of Hospital-Acquired Illness or Injury  Outcome: Progressing  Goal: Optimal Comfort and Wellbeing  Outcome: Progressing  Goal: Readiness for Transition of Care  Outcome: Progressing

## 2024-06-28 NOTE — ASSESSMENT & PLAN NOTE
Troponin 39.6 --> 61.9  Follow up echocardiogram   Stress test ordered - will obtain after stat dialysis  Started on aspirin daily  Follows with Dr. Thompson outpatient - will consult cardiology  Follow up lipid panel - reviewed, TSH - wnl

## 2024-06-28 NOTE — CONSULTS
INPATIENT NEPHROLOGY CONSULT   [facility] NEPHROLOGY    Terramaty Isaac  06/28/2024    Reason for consultation:    esrd    Chief Complaint:   Chief Complaint   Patient presents with    Chest Pain     Pt says she has been having pain in the center of her chest for the last two hours.  Pt sad she threw up prior to the pain.  Pt has hx of afib          History of Present Illness:    Per H and P   Patient is a 84-year-old female with history of atrial fibrillation on Eliquis, ESRD on dialysis Monday Wednesday Friday, CVA presents to emergency room with complaints of chest discomfort.  Patient reports chest discomfort started earlier today lasted about 2 hours, describes it as tightness radiating from the right to left side.  Resolved prior to coming to the emergency room.  Patient reports similar symptoms in the past.  Did have episode of emesis.  Potassium 6.5 on admission, ED physician spoke with Nephrology who ordered stat dialysis, ED physician placed hyperkalemia protocol.  Troponin 39.6.  Patient being admitted for chest pain workup.     6/28  Patient seen on hemodialysis for uremic clearance and ultrafiltration.    Cp resolved.  No sob.      Addendum:  --about 30 minutes after I saw her she had a syncopal episode.  Her blood sugar was 90.   Her blood was rinsed back.   She never became pulseless.  She woke up after the blood was returned.  She briefly had hypotension.  Her bp came up to 150s/80s.   She woke up and was cognitive and appropriate.  No chest pain or sob.   She received about an hour and a half of dialysis    Plan of Care:       Assessment:      esrd  --continue dialysis per routine  --fluid restrict  --renal dose medication per routine  --continue outpt medication  --continue binders with meals    Anemia  --erythropoiesis stimulating agent with renal replacement therapy when hg less than 10    Hyperphosphatemia  --renal diet  --continue binders    Hypertension  --uf with hd  --fluid  restrict  --low salt diet  --continue home medication    Syncope  --likely hemodynamically mediated due to ultrafiltration  --monitor on telemetry  --echo planned  --cardiology has been consulted to evaluate cp.     Hyperkalemia  --shifted last night  --2 k bath  --surveillance k later this afternoon    Thank you for allowing us to participate in this patient's care. We will continue to follow.    Vital Signs:  Temp Readings from Last 3 Encounters:   06/28/24 97.5 °F (36.4 °C) (Oral)   06/03/24 98.5 °F (36.9 °C) (Oral)   03/19/24 97.4 °F (36.3 °C) (Tympanic)       Pulse Readings from Last 3 Encounters:   06/28/24 66   06/06/24 71   06/03/24 69       BP Readings from Last 3 Encounters:   06/28/24 (!) 177/74   06/06/24 (!) 122/56   06/03/24 130/60       Weight:  Wt Readings from Last 3 Encounters:   06/28/24 48.7 kg (107 lb 5.8 oz)   06/06/24 46.7 kg (103 lb)   06/03/24 45.1 kg (99 lb 6.8 oz)       Past Medical & Surgical History:  Past Medical History:   Diagnosis Date    A-fib     Anxiety     Depression     Disorder of kidney and ureter     Encephalopathy acute 1/1/2018    End stage kidney disease 6/17/2017    Gout     Hyperlipidemia     Hypertension     Moderate episode of recurrent major depressive disorder 1/17/2018    Nephropathy hypertensive, 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    Osteopenia of multiple sites 3/9/2018    Based upon bone density measurements. Patient also has chronic kidney disease.    Stroke 11/2016       Past Surgical History:   Procedure Laterality Date    ANGIOGRAM, CORONARY, WITH LEFT HEART CATHETERIZATION N/A 2/7/2022    Procedure: Angiogram, Coronary, with Left Heart Cath;  Surgeon: Gino Leal MD;  Location: [facility] CATH/EP LAB;  Service: Cardiology;  Laterality: N/A;    CARDIAC SURGERY      stents    EYE SURGERY      WRIST SURGERY         Past Social  History:  Social History     Socioeconomic History    Marital status:      Spouse name: Aria Isaac    Number of children: 3   Occupational History    Occupation: Not working   Tobacco Use    Smoking status: Never    Smokeless tobacco: Never   Substance and Sexual Activity    Alcohol use: No    Drug use: No    Sexual activity: Not Currently     Social Determinants of Health     Financial Resource Strain: Medium Risk (6/15/2022)    Overall Financial Resource Strain (CARDIA)     Difficulty of Paying Living Expenses: Somewhat hard   Food Insecurity: No Food Insecurity (6/15/2022)    Hunger Vital Sign     Worried About Running Out of Food in the Last Year: Never true     Ran Out of Food in the Last Year: Never true   Transportation Needs: Unmet Transportation Needs (3/29/2023)    PRAPARE - Transportation     Lack of Transportation (Medical): Yes     Lack of Transportation (Non-Medical): No   Physical Activity: Inactive (6/15/2022)    Exercise Vital Sign     Days of Exercise per Week: 0 days     Minutes of Exercise per Session: 0 min   Stress: Stress Concern Present (6/15/2022)    [employer] - Occupational Stress Questionnaire     Feeling of Stress : To some extent   Housing Stability: Low Risk  (6/15/2022)    Housing Stability Vital Sign     Unable to Pay for Housing in the Last Year: No     Number of Places Lived in the Last Year: 1     Unstable Housing in the Last Year: No       Medications:  No current facility-administered medications on file prior to encounter.     Current Outpatient Medications on File Prior to Encounter   Medication Sig Dispense Refill    atorvastatin (LIPITOR) 40 MG tablet Take 40 mg by mouth once daily.      b complex vitamins tablet Take 1 tablet by mouth once daily.      calcium acetate,phosphat bind, (PHOSLO) 667 mg capsule Take 667 mg by mouth 3 (three) times daily with meals.      dorzolamide-timolol 2-0.5% (COSOPT) 22.3-6.8 mg/mL ophthalmic  solution Place 1 drop into both eyes 2 (two) times daily.      dronedarone (MULTAQ) 400 mg Tab Take 1 tablet (400 mg total) by mouth 2 (two) times daily with meals. 180 tablet 0    ELIQUIS 2.5 mg Tab Take 2.5 mg by mouth 2 (two) times daily.      latanoprost 0.005 % ophthalmic solution Place 1 drop into both eyes every evening.      loperamide (IMODIUM A-D) 2 mg Tab Take 1 tablet (2 mg total) by mouth 4 (four) times daily as needed (diarrhea). 3 tablet 0    midodrine (PROAMATINE) 10 MG tablet Take 1 tablet (10 mg total) by mouth 3 (three) times daily with meals. 270 tablet 0    ondansetron (ZOFRAN-ODT) 4 MG TbDL Take 1 tablet (4 mg total) by mouth every 6 (six) hours as needed (nausea). 30 tablet 5     Scheduled Meds:   [START ON 6/29/2024] apixaban  2.5 mg Oral BID    aspirin  81 mg Oral Daily    atorvastatin  40 mg Oral QHS    dronedarone  400 mg Oral BID WM     Continuous Infusions:  PRN Meds:.  Current Facility-Administered Medications:     acetaminophen, 650 mg, Oral, Q8H PRN    dextrose 50%, 12.5 g, Intravenous, PRN    dextrose 50%, 25 g, Intravenous, PRN    glucagon (human recombinant), 1 mg, Intramuscular, PRN    glucose, 16 g, Oral, PRN    glucose, 24 g, Oral, PRN    magnesium oxide, 800 mg, Oral, PRN    magnesium oxide, 800 mg, Oral, PRN    melatonin, 6 mg, Oral, Nightly PRN    potassium bicarbonate, 35 mEq, Oral, PRN    potassium bicarbonate, 50 mEq, Oral, PRN    potassium bicarbonate, 60 mEq, Oral, PRN    potassium, sodium phosphates, 2 packet, Oral, PRN    potassium, sodium phosphates, 2 packet, Oral, PRN    potassium, sodium phosphates, 2 packet, Oral, PRN    promethazine (PHENERGAN) 12.5 mg in 0.9% NaCl 50 mL IVPB, 12.5 mg, Intravenous, Q6H PRN    sodium chloride 0.9%, 10 mL, Intravenous, PRN    Allergies:  Cyclobenzaprine, Fish containing products, Peanut, and Tramadol    Past Family History:  Reviewed; refer to Hospitalist Admission Note    Review of Systems:  Review of Systems - All 14 systems  "reviewed and negative, except as noted in HPI    Physical Exam:    BP (!) 177/74   Pulse 66   Temp 97.5 °F (36.4 °C) (Oral)   Resp 16   Ht 5' 5" (1.651 m)   Wt 48.7 kg (107 lb 5.8 oz)   SpO2 (!) 93%   BMI 17.87 kg/m²     General Appearance:    Alert, cooperative, no distress, appears stated age   Head:    Normocephalic, without obvious abnormality, atraumatic   Eyes:    PER, conjunctiva/corneas clear, EOM's intact in both eyes        Throat:   Lips, mucosa, and tongue normal; teeth and gums normal   Back:     Symmetric, no curvature, ROM normal, no CVA tenderness   Lungs:     Clear to auscultation bilaterally, respirations unlabored   Chest wall:    No tenderness or deformity   Heart:    Regular rate and rhythm, S1 and S2 normal, no murmur, rub   or gallop   Abdomen:     Soft, non-tender, bowel sounds active all four quadrants,     no masses, no organomegaly   Extremities:   Extremities normal, atraumatic, no cyanosis or edema   Pulses:   2+ and symmetric all extremities   MSK:   No joint or muscle swelling, tenderness or deformity   Skin:   Skin color, texture, turgor normal, no rashes or lesions   Neurologic:   CNII-XII intact, normal strength and sensation       Throughout.  No flap     Results:  Lab Results   Component Value Date     06/28/2024    K 5.4 (H) 06/28/2024    CL 99 06/28/2024    CO2 30 (H) 06/28/2024    BUN 58 (H) 06/28/2024    CREATININE 9.7 (H) 06/28/2024    CALCIUM 9.5 06/28/2024    ANIONGAP 13 06/28/2024    ESTGFRAFRICA 4.7 (A) 02/08/2022    EGFRNONAA 4.0 (A) 02/08/2022       Lab Results   Component Value Date    CALCIUM 9.5 06/28/2024    PHOS 3.1 03/16/2024       Recent Labs   Lab 06/28/24  0553   WBC 5.87   RBC 4.62   HGB 12.0   HCT 41.0      MCV 89   MCH 26.0*   MCHC 29.3*        Imaging Results              X-Ray Chest AP Portable (Final result)  Result time 06/28/24 01:10:09      Final result by Aldo Espinosa MD (06/28/24 01:10:09)                   Impression:    "   Radiographic findings as above.      Electronically signed by: Aldo Espinosa  Date:    06/28/2024  Time:    01:10               Narrative:    EXAMINATION:  XR CHEST AP PORTABLE    CLINICAL HISTORY:  Chest Pain;    TECHNIQUE:  Single frontal view of the chest was performed.    COMPARISON:  Prior examinations, most recent of which is March 19, 2024    FINDINGS:  Single portable chest view is submitted.  When accounting for position and technique and depth of inspiration the appearance of the cardiomediastinal silhouette is stable.  Aortic atherosclerotic change and mild tortuosity is noted.    Mild accentuation of interstitial markings may relate to a pattern of mild interstitial infiltrate/edema.  There is no focal consolidation, there is no pleural effusion or pneumothorax.  Skin folds overlie the patient.    The osseous structures demonstrate chronic change.                        Wet Read by Jacquie Wang MD (06/28/24 00:32:39, [facility] - Emergency Dept, Emergency Medicine)    No pulmonary infiltrates no pulmonary edema no cardiomegaly no pleural effusion                                    Patient care was time spent personally by me on the following activities:   Obtaining a history  Examination of patient.  Providing medical care at the patients bedside.  Developing a treatment plan with patient or surrogate and bedside caregivers  Ordering and reviewing laboratory studies, radiographic studies, pulse oximetry.  Ordering and performing treatments and interventions.  Evaluation of patient's response to treatment.  Discussions with consultants while on the unit and immediately available to the patient.  Re-evaluation of the patient's condition.  Documentation in the medical record.       I have personally reviewed pertinent radiological imaging and reports.    Jimenez Valero MD  [facility] [place]  [phone]

## 2024-06-28 NOTE — ASSESSMENT & PLAN NOTE
This patient has hyperkalemia which is uncontrolled. We will monitor for arrhythmias with EKG or continuous telemetry. We will treat the hyperkalemia with IV insulin and dextrose and Nebulized albuterol sulfate. The likely etiology of the hyperkalemia is ESRD.  The patients latest potassium has been reviewed and the results are listed below  Recent Labs   Lab 06/28/24  0018   K 6.5*

## 2024-06-28 NOTE — NURSING
Nurses Note -- 4 Eyes      6/28/2024   4:11 AM      Skin assessed during: Admit      [x] No Altered Skin Integrity Present    [x]Prevention Measures Documented      [] Yes- Altered Skin Integrity Present or Discovered   [] LDA Added if Not in Epic (Describe Wound)   [] New Altered Skin Integrity was Present on Admit and Documented in LDA   [] Wound Image Taken    Wound Care Consulted? No    Attending Nurse:  Marybeth Henao RN/Staff Member:  AMY Cruz

## 2024-06-28 NOTE — H&P
[facility] - Emergency Dept  Hospital Medicine  History & Physical    Patient Name: Tyra Isaac  MRN: [id number]  Patient Class: OP- Observation  Admission Date: 6/27/2024  Attending Physician: Megan Cabral MD   Primary Care Provider: Kalpesh Goel MD         Patient information was obtained from patient and ER records.     Subjective:     Principal Problem:Chest pain    Chief Complaint:   Chief Complaint   Patient presents with    Chest Pain     Pt says she has been having pain in the center of her chest for the last two hours.  Pt sad she threw up prior to the pain.  Pt has hx of afib        HPI: Patient is a 84-year-old female with history of atrial fibrillation on Eliquis, ESRD on dialysis Monday Wednesday Friday, CVA presents to emergency room with complaints of chest discomfort.  Patient reports chest discomfort started earlier today lasted about 2 hours, describes it as tightness radiating from the right to left side.  Resolved prior to coming to the emergency room.  Patient reports similar symptoms in the past.  Did have episode of emesis.  Potassium 6.5 on admission, ED physician spoke with Nephrology who ordered stat dialysis, ED physician placed hyperkalemia protocol.  Troponin 39.6.  Patient being admitted for chest pain workup.    Past Medical History:   Diagnosis Date    A-fib     Anxiety     Depression     Disorder of kidney and ureter     Encephalopathy acute 1/1/2018    End stage kidney disease 6/17/2017    Gout     Hyperlipidemia     Hypertension     Moderate episode of recurrent major depressive disorder 1/17/2018    Nephropathy hypertensive, 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    Osteopenia of multiple sites 3/9/2018    Based upon bone density measurements. Patient also has chronic kidney disease.    Stroke 11/2016       Past Surgical  History:   Procedure Laterality Date    ANGIOGRAM, CORONARY, WITH LEFT HEART CATHETERIZATION N/A 2/7/2022    Procedure: Angiogram, Coronary, with Left Heart Cath;  Surgeon: Gino Leal MD;  Location: [facility] CATH/EP LAB;  Service: Cardiology;  Laterality: N/A;    CARDIAC SURGERY      stents    EYE SURGERY      WRIST SURGERY         Review of patient's allergies indicates:   Allergen Reactions    Cyclobenzaprine     Fish containing products Hives    Peanut Other (See Comments)    Tramadol Itching       No current facility-administered medications on file prior to encounter.     Current Outpatient Medications on File Prior to Encounter   Medication Sig    atorvastatin (LIPITOR) 40 MG tablet Take 40 mg by mouth once daily.    b complex vitamins tablet Take 1 tablet by mouth once daily.    calcium acetate,phosphat bind, (PHOSLO) 667 mg capsule Take 667 mg by mouth 3 (three) times daily with meals.    dorzolamide-timolol 2-0.5% (COSOPT) 22.3-6.8 mg/mL ophthalmic solution Place 1 drop into both eyes 2 (two) times daily.    dronedarone (MULTAQ) 400 mg Tab Take 1 tablet (400 mg total) by mouth 2 (two) times daily with meals.    ELIQUIS 2.5 mg Tab Take 2.5 mg by mouth 2 (two) times daily.    latanoprost 0.005 % ophthalmic solution Place 1 drop into both eyes every evening.    loperamide (IMODIUM A-D) 2 mg Tab Take 1 tablet (2 mg total) by mouth 4 (four) times daily as needed (diarrhea).    midodrine (PROAMATINE) 10 MG tablet Take 1 tablet (10 mg total) by mouth 3 (three) times daily with meals.    ondansetron (ZOFRAN-ODT) 4 MG TbDL Take 1 tablet (4 mg total) by mouth every 6 (six) hours as needed (nausea).     Family History       Problem Relation (Age of Onset)    Cancer Father    Heart disease Mother          Tobacco Use    Smoking status: Never    Smokeless tobacco: Never   Substance and Sexual Activity    Alcohol use: No    Drug use: No    Sexual activity: Not Currently     Review of Systems   Constitutional:   Negative for appetite change and fatigue.   HENT:  Negative for congestion.    Respiratory:  Positive for chest tightness. Negative for shortness of breath.    Cardiovascular:  Positive for chest pain. Negative for palpitations.   Gastrointestinal:  Negative for abdominal distention and abdominal pain.   Musculoskeletal:  Negative for arthralgias.   Neurological:  Negative for dizziness and numbness.   Psychiatric/Behavioral:  Negative for agitation.      Objective:     Vital Signs (Most Recent):  Temp: 98.1 °F (36.7 °C) (06/27/24 2343)  Pulse: 71 (06/28/24 0028)  Resp: 20 (06/28/24 0048)  BP: (!) 165/77 (06/28/24 0028)  SpO2: 100 % (06/28/24 0028) Vital Signs (24h Range):  Temp:  [98.1 °F (36.7 °C)] 98.1 °F (36.7 °C)  Pulse:  [71-81] 71  Resp:  [18-20] 20  SpO2:  [97 %-100 %] 100 %  BP: (139-165)/(73-77) 165/77     Weight: 47.6 kg (105 lb)  Body mass index is 17.21 kg/m².     Physical Exam  Constitutional:       General: She is not in acute distress.     Appearance: She is not ill-appearing.   HENT:      Head: Atraumatic.   Cardiovascular:      Rate and Rhythm: Normal rate and regular rhythm.   Pulmonary:      Effort: No respiratory distress.      Breath sounds: No wheezing.   Abdominal:      General: There is no distension.      Tenderness: There is no abdominal tenderness.   Musculoskeletal:         General: No swelling.   Skin:     General: Skin is dry.   Neurological:      General: No focal deficit present.      Mental Status: She is alert and oriented to person, place, and time.                Significant Labs: All pertinent labs within the past 24 hours have been reviewed.  CBC:   Recent Labs   Lab 06/28/24 0018   WBC 5.91   HGB 13.2   HCT 45.5        CMP:   Recent Labs   Lab 06/28/24 0018      K 6.5*   CL 98   CO2 30*   GLU 90   BUN 56*   CREATININE 9.2*   CALCIUM 9.6   PROT 7.6   ALBUMIN 4.3   BILITOT 0.4   ALKPHOS 64   AST 21   ALT 16   ANIONGAP 12       Significant Imaging: I have reviewed  all pertinent imaging results/findings within the past 24 hours.  Assessment/Plan:     * Chest pain  Troponin 39.6, trend troponin   Follow up echocardiogram   Stress test ordered   Started on aspirin daily  Follows with Dr. Thompson outpatient  Follow up lipid panel, TSH      Hyperkalemia  This patient has hyperkalemia which is uncontrolled. We will monitor for arrhythmias with EKG or continuous telemetry. We will treat the hyperkalemia with IV insulin and dextrose and Nebulized albuterol sulfate. The likely etiology of the hyperkalemia is ESRD.  The patients latest potassium has been reviewed and the results are listed below  Recent Labs   Lab 06/28/24  0018   K 6.5*             ESRD (end stage renal disease)  Creatine stable for now. BMP reviewed- noted Estimated Creatinine Clearance: 3.4 mL/min (A) (based on SCr of 9.2 mg/dL (H)). according to latest data. Based on current GFR, CKD stage is end stage.  Monitor UOP and serial BMP and adjust therapy as needed. Renally dose meds. Avoid nephrotoxic medications and procedures.  Nephrology consulted    Atrial fibrillation  On Eliquis and Multaq outpatient      VTE Risk Mitigation (From admission, onward)           Ordered     apixaban tablet 2.5 mg  2 times daily         06/28/24 0207     heparin (porcine) injection 5,000 Units  Every 8 hours         06/28/24 0207     IP VTE HIGH RISK PATIENT  Once         06/28/24 0207     Place sequential compression device  Until discontinued         06/28/24 0207                       On 06/28/2024, patient should be placed in hospital observation services under my care.             Megan Cabral MD  Department of Hospital Medicine  Formerly [facility] - Emergency Dept

## 2024-06-28 NOTE — SUBJECTIVE & OBJECTIVE
Interval History:  Patient seen and examined on dialysis.  No further chest pain since admission.  Reports that her chest felt tight she was short of breath with radiation of the pain to her left chest.  She reports the pain had lasted approximately 2 hours.  Discussed plan of care with patient and answered all questions.    Update:  Rapid response called while patient was on dialysis and Dr. Valero at the bedside.  Patient had syncopal episode with brief hypotension with systolic in 80's.  Her midodrine had been held earlier secondary to HTN..  Her blood was rinsed back and she woke up.  She was appropriate and cognitively intact.  I re-evaluated patient.  She reports feeling presyncope during her dialysis.  She is back to baseline now.  She denied any chest pain or shortness of breath.    Review of Systems   Respiratory:  Positive for chest tightness.    Cardiovascular:  Positive for chest pain.   All other systems reviewed and are negative.    Objective:     Vital Signs (Most Recent):  Temp: 97.5 °F (36.4 °C) (06/28/24 0905)  Pulse: 68 (06/28/24 1116)  Resp: 16 (06/28/24 1116)  BP: 139/70 (06/28/24 1116)  SpO2: (!) 93 % (06/28/24 0815) Vital Signs (24h Range):  Temp:  [97.5 °F (36.4 °C)-98.2 °F (36.8 °C)] 97.5 °F (36.4 °C)  Pulse:  [] 68  Resp:  [16-20] 16  SpO2:  [93 %-100 %] 93 %  BP: ()/() 139/70     Weight: 48.7 kg (107 lb 5.8 oz)  Body mass index is 17.87 kg/m².    Intake/Output Summary (Last 24 hours) at 6/28/2024 1130  Last data filed at 6/28/2024 0856  Gross per 24 hour   Intake 0 ml   Output --   Net 0 ml         Physical Exam  Constitutional:       General: She is not in acute distress.     Appearance: She is ill-appearing (appears chronically ill). She is not toxic-appearing or diaphoretic.      Comments: Frail and cachectic   HENT:      Head: Normocephalic and atraumatic.   Cardiovascular:      Rate and Rhythm: Normal rate and regular rhythm.      Pulses: Normal pulses.      Heart  sounds: Normal heart sounds.      Comments: Right upper arm fistula presently accessed for dialysis.  Pulmonary:      Effort: Pulmonary effort is normal. No respiratory distress.      Breath sounds: Normal breath sounds.   Abdominal:      General: Bowel sounds are normal. There is no distension.      Palpations: Abdomen is soft.      Tenderness: There is no abdominal tenderness. There is no guarding.   Musculoskeletal:         General: Normal range of motion.      Right lower leg: No edema.      Left lower leg: No edema.   Skin:     General: Skin is warm and dry.      Coloration: Skin is not jaundiced.   Neurological:      Mental Status: She is alert and oriented to person, place, and time. Mental status is at baseline.             Significant Labs: All pertinent labs within the past 24 hours have been reviewed.  CBC:   Recent Labs   Lab 06/28/24  0018 06/28/24  0553   WBC 5.91 5.87   HGB 13.2 12.0   HCT 45.5 41.0    165     CMP:   Recent Labs   Lab 06/28/24  0018 06/28/24  0552    142   K 6.5* 5.4*   CL 98 99   CO2 30* 30*   GLU 90 54*   BUN 56* 58*   CREATININE 9.2* 9.7*   CALCIUM 9.6 9.5   PROT 7.6 6.9   ALBUMIN 4.3 3.8   BILITOT 0.4 0.4   ALKPHOS 64 58   AST 21 17   ALT 16 13   ANIONGAP 12 13     Magnesium:   Recent Labs   Lab 06/28/24  0018   MG 2.3     Troponin:   Recent Labs   Lab 06/28/24  0018 06/28/24  0553   TROPONINIHS 39.6* 61.9*       Significant Imaging: I have reviewed all pertinent imaging results/findings within the past 24 hours.

## 2024-06-28 NOTE — ASSESSMENT & PLAN NOTE
Creatine stable for now. BMP reviewed- noted Estimated Creatinine Clearance: 3.3 mL/min (A) (based on SCr of 9.7 mg/dL (H)). according to latest data. Based on current GFR, CKD stage is end stage.  Monitor UOP and serial BMP and adjust therapy as needed. Renally dose meds. Avoid nephrotoxic medications and procedures.  Nephrology consulted

## 2024-06-28 NOTE — ED PROVIDER NOTES
Encounter Date: 6/27/2024       History     Chief Complaint   Patient presents with    Chest Pain     Pt says she has been having pain in the center of her chest for the last two hours.  Pt sad she threw up prior to the pain.  Pt has hx of afib     Emergent eval of an 84-year-old female with history of end-stage renal disease dialyzes MWF, AFib on Eliquis, CVA, obstructive lung disease, hypertension, hyperlipidemia, anxiety depression presents to the ER for evaluation of chest pain that began 2 hours ago described as a tightness.  she reports he was a tightness radiating from the right to left chest.  Reports it has been 8/10 in his now resolved had associated shortness of breath nausea with the episode of vomiting weakness and dizziness.  No diaphoresis.  She reports no cardiac history.  She reports no cough or URI symptoms.      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    Osteopenia of multiple sites 3/9/2018    Based upon bone density measurements. Patient also has chronic kidney disease.    Stroke 11/2016     Past Surgical History:   Procedure Laterality Date    ANGIOGRAM, CORONARY, WITH LEFT HEART CATHETERIZATION N/A 2/7/2022    Procedure: Angiogram, Coronary, with Left Heart Cath;  Surgeon: Gino Leal MD;  Location: [facility] CATH/EP LAB;  Service: Cardiology;  Laterality: N/A;    CARDIAC SURGERY       stents    EYE SURGERY      WRIST SURGERY       Family History   Problem Relation Name Age of Onset    Heart disease Mother      Cancer Father       Social History     Tobacco Use    Smoking status: Never    Smokeless tobacco: Never   Substance Use Topics    Alcohol use: No    Drug use: No     Review of Systems   Constitutional:  Negative for activity change, appetite change, chills, diaphoresis, fatigue and fever.   HENT:  Negative for congestion, postnasal drip, rhinorrhea and sore throat.    Respiratory:  Positive for shortness of breath. Negative for cough, chest tightness, wheezing and stridor.    Cardiovascular:  Positive for chest pain. Negative for palpitations and leg swelling.   Gastrointestinal:  Positive for nausea and vomiting. Negative for abdominal distention, abdominal pain, blood in stool, constipation and diarrhea.   Genitourinary:  Negative for dysuria, flank pain, frequency, hematuria and urgency.   Musculoskeletal:  Negative for arthralgias, back pain, myalgias, neck pain and neck stiffness.   Skin:  Negative for rash.   Neurological:  Negative for dizziness, syncope, weakness, light-headedness and headaches.   Hematological:  Does not bruise/bleed easily.   Psychiatric/Behavioral:  Negative for confusion. The patient is not nervous/anxious.    All other systems reviewed and are negative.      Physical Exam     Initial Vitals [06/27/24 2343]   BP Pulse Resp Temp SpO2   139/73 81 18 98.1 °F (36.7 °C) 97 %      MAP       --         Physical Exam    Nursing note and vitals reviewed.  Constitutional: She appears well-developed and well-nourished. She is not diaphoretic. No distress.   Thin petite female   HENT:   Head: Normocephalic and atraumatic.   Right Ear: External ear normal.   Left Ear: External ear normal.   Nose: Nose normal.   Mouth/Throat: Oropharynx is clear and moist.   Eyes: Conjunctivae and EOM are normal. Pupils are equal, round, and reactive to light.   Neck: Neck supple. No  tracheal deviation present.   Normal range of motion.  Cardiovascular:  Normal rate, regular rhythm, normal heart sounds and intact distal pulses.     Exam reveals no gallop and no friction rub.       No murmur heard.  Blood pressure 165/77 pulse 71   Pulmonary/Chest: Breath sounds normal. No stridor. No respiratory distress. She has no wheezes. She has no rhonchi. She has no rales. She exhibits no tenderness.   100% on room air respirations 20 clear breath sounds bilaterally   Abdominal: Abdomen is soft. Bowel sounds are normal. She exhibits no distension and no mass. There is no abdominal tenderness. There is no rebound and no guarding.   Musculoskeletal:         General: No edema. Normal range of motion.      Cervical back: Normal range of motion and neck supple.      Comments: AV fistula site to right upper extremity     Neurological: She is alert and oriented to person, place, and time. She has normal strength. No cranial nerve deficit or sensory deficit.   Skin: Skin is warm and dry. No rash noted. No erythema. No pallor.   Psychiatric: She has a normal mood and affect. Her behavior is normal. Judgment and thought content normal.         ED Course   Procedures  Labs Reviewed   CBC W/ AUTO DIFFERENTIAL - Abnormal; Notable for the following components:       Result Value    MCH 26.2 (*)     MCHC 29.0 (*)     RDW 17.2 (*)     Eos # 0.6 (*)     Lymph % 17.3 (*)     Mono % 15.6 (*)     Eosinophil % 10.0 (*)     All other components within normal limits   COMPREHENSIVE METABOLIC PANEL - Abnormal; Notable for the following components:    Potassium 6.5 (*)     BUN 56 (*)     Creatinine 9.2 (*)     eGFR 3.9 (*)     All other components within normal limits   TROPONIN I HIGH SENSITIVITY - Abnormal; Notable for the following components:    Troponin I High Sensitivity 39.6 (*)     All other components within normal limits   B-TYPE NATRIURETIC PEPTIDE - Abnormal; Notable for the following components:     (*)     All  other components within normal limits   MAGNESIUM     EKG Readings: (Independently Interpreted)   Previous EKG: Compared with most recent EKG   Sinus rhythm with sinus arrhythmia rate 85, inverted T-wave in lead 3 and AVF as well as V3 through V5    Compared to prior EKG March 15, 2024 similar T-wave inversions in inferior and anterolateral leads no acute change           Other EKG Interpretations: Repeat EKG at 12:35 a.m. normal sinus rhythm rate 66 inverted T-waves in lead 3 AVF V4 V5 V6.  No ST depression.  Mild ST elevations V2 V3       Imaging Results              X-Ray Chest AP Portable (Final result)  Result time 06/28/24 01:10:09      Final result by Aldo Espinosa MD (06/28/24 01:10:09)                   Impression:      Radiographic findings as above.      Electronically signed by: Aldo Espinosa  Date:    06/28/2024  Time:    01:10               Narrative:    EXAMINATION:  XR CHEST AP PORTABLE    CLINICAL HISTORY:  Chest Pain;    TECHNIQUE:  Single frontal view of the chest was performed.    COMPARISON:  Prior examinations, most recent of which is March 19, 2024    FINDINGS:  Single portable chest view is submitted.  When accounting for position and technique and depth of inspiration the appearance of the cardiomediastinal silhouette is stable.  Aortic atherosclerotic change and mild tortuosity is noted.    Mild accentuation of interstitial markings may relate to a pattern of mild interstitial infiltrate/edema.  There is no focal consolidation, there is no pleural effusion or pneumothorax.  Skin folds overlie the patient.    The osseous structures demonstrate chronic change.                        Wet Read by Jacquie Wang MD (06/28/24 00:32:39, [facility] - Emergency Dept, Emergency Medicine)    No pulmonary infiltrates no pulmonary edema no cardiomegaly no pleural effusion                                  X-Rays:   Independently Interpreted Readings:   Chest X-Ray: Normal  heart size.  No infiltrates.  No acute abnormalities.     Medications   aspirin tablet 325 mg (has no administration in time range)   calcium gluconate 1 g in NS IVPB (premixed) (has no administration in time range)     Medical Decision Making  Emergent eval of an 84-year-old female with history of end-stage renal disease dialyzes MWF, AFib on Eliquis, CVA, obstructive lung disease, hypertension, hyperlipidemia, anxiety depression presents to the ER for evaluation of chest pain that began 2 hours ago she reports he was a tightness radiating from the right to left chest.  Reports it has been 8/10 in his now resolved had associated shortness of breath nausea with the episode of vomiting weakness and dizziness.  No diaphoresis.  She reports no cardiac history.  She reports no cough or URI symptoms.  On exam blood pressure 165/77 pulse 71 sats 100% respirations 20 patient was reclined in bed in no distress normal cardiac and lung exam no JVD no peripheral edema  MDM    Patient presents for emergent evaluation of acute chest pain shortness of breath nausea vomiting 2 hours ago now resolved that poses a possible threat to life and/or bodily function.    Differential diagnosis includes but is not limited to ACS including unstable angina and MI, pulmonary edema pleural effusions pneumonia bronchitis obstructive lung disease PE  In the ED patient found to have acute chest pain, shortness of breath, end-stage renal disease on HD, hyperkalemia  I ordered labs and personally reviewed them.  Labs significant for see below  I ordered X-rays and personally reviewed them and reviewed the radiologist interpretation.  Xray significant for see above.    I ordered EKG and personally reviewed it.  EKG significant for see above    Admission [facility]  I discussed the patient presentation labs, ekg, X-rays, CT findings with the Hospitalist   Patient was managed in the ED with aspirin 325 mg  The response to treatment was good.    Patient  required emergent consultation to hospitalist for admission.  Jacquie Wang M.D.         Amount and/or Complexity of Data Reviewed  External Data Reviewed: notes.     Details: Echo 1/24   Left Ventricle: The left ventricle is normal in size. Normal wall thickness. Normal wall motion. There is normal systolic function with a visually estimated ejection fraction of 60 - 65%. There is diastolic dysfunction.  ·  Right Ventricle: Normal right ventricular cavity size. Wall thickness is normal. Right ventricle wall motion  is normal. Systolic function is normal.  ·  Left Atrium: Left atrium is severely dilated.  ·  Mitral Valve: There is moderate bileaflet sclerosis. There is moderate mitral annular calcification present. There is mild to moderate regurgitation with a centrally directed jet.  ·  Tricuspid Valve: There is mild to moderate regurgitation.  ·  Pulmonary Artery: There is mild pulmonary hypertension. The estimated pulmonary artery systolic pressure is 41 mmHg.  ·  IVC/SVC: Normal venous pressure at 3 mmHg.    Labs: ordered.     Details: Mag 2.3  Normal CBC  CMP with BUN 56 creatinine 9.2  Potassium 6.5  Radiology: ordered and independent interpretation performed.    Risk  OTC drugs.  Prescription drug management.  Decision regarding hospitalization.              Attending Attestation:         Attending Critical Care:   Critical Care Times:   Direct Patient Care (initial evaluation, reassessments, and time considering the case)................................................................10 minutes.   Additional History from reviewing old medical records or taking additional history from the family, EMS, PCP, etc.......................5 minutes.   Ordering, Reviewing, and Interpreting Diagnostic Studies...............................................................................................................5 minutes.    Documentation..................................................................................................................................................................................10 minutes.   Consultation with other Physicians. .................................................................................................................................................5 minutes.   Consultation with the patient's family directly relating to the patient's condition, care, and DNR status (when patient unable)......5 minutes.   ==============================================================  Total Critical Care Time - exclusive of procedural time: 40 minutes.  ==============================================================  Critical care reasons: hyperkalemia.   Critical care was time spent personally by me on the following activities: obtaining history from patient or relative, examination of patient, review of old charts, ordering lab, x-rays, and/or EKG, development of treatment plan with patient or relative, ordering and performing treatments and interventions, evaluation of patient's response to treatment, discussion with consultants, interpretation of cardiac measurements, discussions with primary provider and re-evaluation of patient's conition.   Critical Care Condition: life-threatening           ED Course as of 06/28/24 0149   Fri Jun 28, 2024   0121 Troponin 39.6   [RM]      ED Course User Index  [RM] Jacquie Wang MD                           Clinical Impression:  Final diagnoses:  [R07.9] Chest pain  [R06.02] SOB (shortness of breath) (Primary)  [N18.6, Z99.2] End stage renal disease on dialysis  [E87.5] Hyperkalemia          ED Disposition Condition    Admit Stable                Jacquie Wang MD  06/28/24 0125       Jacquie Wang MD  06/28/24 0149

## 2024-06-29 ENCOUNTER — CLINICAL SUPPORT (OUTPATIENT)
Dept: CARDIOLOGY | Facility: HOSPITAL | Age: 84
End: 2024-06-29
Attending: STUDENT IN AN ORGANIZED HEALTH CARE EDUCATION/TRAINING PROGRAM
Payer: MEDICARE

## 2024-06-29 LAB
BASOPHILS # BLD AUTO: 0.03 K/UL (ref 0–0.2)
BASOPHILS NFR BLD: 0.3 % (ref 0–1.9)
CV PHARM DOSE: 0.4 MG
CV STRESS BASE HR: 110 BPM
DIASTOLIC BLOOD PRESSURE: 118 MMHG
DIFFERENTIAL METHOD BLD: ABNORMAL
EOSINOPHIL # BLD AUTO: 0 K/UL (ref 0–0.5)
EOSINOPHIL NFR BLD: 0.2 % (ref 0–8)
ERYTHROCYTE [DISTWIDTH] IN BLOOD BY AUTOMATED COUNT: 18 % (ref 11.5–14.5)
GLUCOSE SERPL-MCNC: 119 MG/DL (ref 70–110)
HCT VFR BLD AUTO: 48.9 % (ref 37–48.5)
HGB BLD-MCNC: 14.2 G/DL (ref 12–16)
IMM GRANULOCYTES # BLD AUTO: 0.06 K/UL (ref 0–0.04)
IMM GRANULOCYTES NFR BLD AUTO: 0.7 % (ref 0–0.5)
LYMPHOCYTES # BLD AUTO: 1.1 K/UL (ref 1–4.8)
LYMPHOCYTES NFR BLD: 11.4 % (ref 18–48)
MCH RBC QN AUTO: 26.2 PG (ref 27–31)
MCHC RBC AUTO-ENTMCNC: 29 G/DL (ref 32–36)
MCV RBC AUTO: 90 FL (ref 82–98)
MONOCYTES # BLD AUTO: 1.2 K/UL (ref 0.3–1)
MONOCYTES NFR BLD: 13 % (ref 4–15)
NEUTROPHILS # BLD AUTO: 6.8 K/UL (ref 1.8–7.7)
NEUTROPHILS NFR BLD: 74.4 % (ref 38–73)
NRBC BLD-RTO: 0 /100 WBC
OHS CV CPX 1 MINUTE RECOVERY HEART RATE: 119 BPM
OHS CV CPX 85 PERCENT MAX PREDICTED HEART RATE MALE: 116
OHS CV CPX MAX PREDICTED HEART RATE: 136
OHS CV CPX PATIENT IS FEMALE: 1
OHS CV CPX PATIENT IS MALE: 0
OHS CV CPX PEAK DIASTOLIC BLOOD PRESSURE: 84 MMHG
OHS CV CPX PEAK HEAR RATE: 125 BPM
OHS CV CPX PEAK RATE PRESSURE PRODUCT: NORMAL
OHS CV CPX PEAK SYSTOLIC BLOOD PRESSURE: 264 MMHG
OHS CV CPX PERCENT MAX PREDICTED HEART RATE ACHIEVED: 95
OHS CV CPX RATE PRESSURE PRODUCT PRESENTING: NORMAL
PLATELET # BLD AUTO: 140 K/UL (ref 150–450)
PMV BLD AUTO: 9.9 FL (ref 9.2–12.9)
RBC # BLD AUTO: 5.43 M/UL (ref 4–5.4)
SYSTOLIC BLOOD PRESSURE: 154 MMHG
WBC # BLD AUTO: 9.18 K/UL (ref 3.9–12.7)

## 2024-06-29 PROCEDURE — 93016 CV STRESS TEST SUPVJ ONLY: CPT | Mod: ,,, | Performed by: INTERNAL MEDICINE

## 2024-06-29 PROCEDURE — 36415 COLL VENOUS BLD VENIPUNCTURE: CPT | Performed by: STUDENT IN AN ORGANIZED HEALTH CARE EDUCATION/TRAINING PROGRAM

## 2024-06-29 PROCEDURE — 25000003 PHARM REV CODE 250: Performed by: INTERNAL MEDICINE

## 2024-06-29 PROCEDURE — 93017 CV STRESS TEST TRACING ONLY: CPT

## 2024-06-29 PROCEDURE — 93005 ELECTROCARDIOGRAM TRACING: CPT | Performed by: GENERAL PRACTICE

## 2024-06-29 PROCEDURE — 93018 CV STRESS TEST I&R ONLY: CPT | Mod: ,,, | Performed by: INTERNAL MEDICINE

## 2024-06-29 PROCEDURE — 99900035 HC TECH TIME PER 15 MIN (STAT)

## 2024-06-29 PROCEDURE — 25000003 PHARM REV CODE 250: Performed by: STUDENT IN AN ORGANIZED HEALTH CARE EDUCATION/TRAINING PROGRAM

## 2024-06-29 PROCEDURE — 99900031 HC PATIENT EDUCATION (STAT)

## 2024-06-29 PROCEDURE — 93010 ELECTROCARDIOGRAM REPORT: CPT | Mod: 76,,, | Performed by: GENERAL PRACTICE

## 2024-06-29 PROCEDURE — 93010 ELECTROCARDIOGRAM REPORT: CPT | Mod: ,,, | Performed by: GENERAL PRACTICE

## 2024-06-29 PROCEDURE — 63600175 PHARM REV CODE 636 W HCPCS: Performed by: STUDENT IN AN ORGANIZED HEALTH CARE EDUCATION/TRAINING PROGRAM

## 2024-06-29 PROCEDURE — 80053 COMPREHEN METABOLIC PANEL: CPT | Performed by: STUDENT IN AN ORGANIZED HEALTH CARE EDUCATION/TRAINING PROGRAM

## 2024-06-29 PROCEDURE — 99233 SBSQ HOSP IP/OBS HIGH 50: CPT | Mod: ,,, | Performed by: INTERNAL MEDICINE

## 2024-06-29 PROCEDURE — 90935 HEMODIALYSIS ONE EVALUATION: CPT

## 2024-06-29 PROCEDURE — 94761 N-INVAS EAR/PLS OXIMETRY MLT: CPT

## 2024-06-29 PROCEDURE — 94799 UNLISTED PULMONARY SVC/PX: CPT

## 2024-06-29 PROCEDURE — 25000003 PHARM REV CODE 250: Performed by: NURSE PRACTITIONER

## 2024-06-29 PROCEDURE — 85025 COMPLETE CBC W/AUTO DIFF WBC: CPT | Performed by: STUDENT IN AN ORGANIZED HEALTH CARE EDUCATION/TRAINING PROGRAM

## 2024-06-29 PROCEDURE — 20000000 HC ICU ROOM

## 2024-06-29 RX ORDER — SODIUM CHLORIDE 9 MG/ML
INJECTION, SOLUTION INTRAVENOUS
Status: DISCONTINUED | OUTPATIENT
Start: 2024-06-29 | End: 2024-07-06 | Stop reason: HOSPADM

## 2024-06-29 RX ORDER — METOPROLOL TARTRATE 25 MG/1
12.5 TABLET ORAL 2 TIMES DAILY
Status: DISCONTINUED | OUTPATIENT
Start: 2024-06-29 | End: 2024-07-06 | Stop reason: HOSPADM

## 2024-06-29 RX ORDER — REGADENOSON 0.08 MG/ML
0.4 INJECTION, SOLUTION INTRAVENOUS
Status: COMPLETED | OUTPATIENT
Start: 2024-06-29 | End: 2024-06-29

## 2024-06-29 RX ORDER — ISOSORBIDE MONONITRATE 30 MG/1
30 TABLET, EXTENDED RELEASE ORAL DAILY
Qty: 30 TABLET | Refills: 0 | Status: SHIPPED | OUTPATIENT
Start: 2024-06-29 | End: 2024-07-06 | Stop reason: HOSPADM

## 2024-06-29 RX ORDER — METOPROLOL TARTRATE 1 MG/ML
5 INJECTION, SOLUTION INTRAVENOUS ONCE
Status: COMPLETED | OUTPATIENT
Start: 2024-06-29 | End: 2024-06-29

## 2024-06-29 RX ORDER — HEPARIN SODIUM 5000 [USP'U]/ML
5000 INJECTION, SOLUTION INTRAVENOUS; SUBCUTANEOUS
Status: DISCONTINUED | OUTPATIENT
Start: 2024-06-29 | End: 2024-07-06 | Stop reason: HOSPADM

## 2024-06-29 RX ORDER — SODIUM CHLORIDE 9 MG/ML
INJECTION, SOLUTION INTRAVENOUS ONCE
Status: DISCONTINUED | OUTPATIENT
Start: 2024-06-29 | End: 2024-06-30

## 2024-06-29 RX ORDER — METOPROLOL TARTRATE 25 MG/1
12.5 TABLET ORAL 2 TIMES DAILY
Status: DISCONTINUED | OUTPATIENT
Start: 2024-06-29 | End: 2024-06-29

## 2024-06-29 RX ORDER — MUPIROCIN 20 MG/G
OINTMENT TOPICAL 2 TIMES DAILY
Status: COMPLETED | OUTPATIENT
Start: 2024-06-29 | End: 2024-07-04

## 2024-06-29 RX ORDER — MUPIROCIN 20 MG/G
OINTMENT TOPICAL 2 TIMES DAILY
Status: DISCONTINUED | OUTPATIENT
Start: 2024-06-29 | End: 2024-06-29

## 2024-06-29 RX ORDER — DILTIAZEM HYDROCHLORIDE 5 MG/ML
10 INJECTION INTRAVENOUS ONCE
Status: COMPLETED | OUTPATIENT
Start: 2024-06-29 | End: 2024-06-29

## 2024-06-29 RX ADMIN — METOPROLOL TARTRATE 12.5 MG: 25 TABLET, FILM COATED ORAL at 04:06

## 2024-06-29 RX ADMIN — REGADENOSON 0.4 MG: 0.08 INJECTION, SOLUTION INTRAVENOUS at 09:06

## 2024-06-29 RX ADMIN — ATORVASTATIN CALCIUM 40 MG: 40 TABLET, FILM COATED ORAL at 08:06

## 2024-06-29 RX ADMIN — APIXABAN 2.5 MG: 2.5 TABLET, FILM COATED ORAL at 08:06

## 2024-06-29 RX ADMIN — SODIUM CHLORIDE 250 ML: 9 INJECTION, SOLUTION INTRAVENOUS at 02:06

## 2024-06-29 RX ADMIN — MUPIROCIN 1 G: 20 OINTMENT TOPICAL at 08:06

## 2024-06-29 RX ADMIN — SODIUM CHLORIDE 250 ML: 9 INJECTION, SOLUTION INTRAVENOUS at 03:06

## 2024-06-29 RX ADMIN — DILTIAZEM HYDROCHLORIDE 5 MG: 5 INJECTION INTRAVENOUS at 02:06

## 2024-06-29 RX ADMIN — ASPIRIN 81 MG: 81 TABLET, COATED ORAL at 10:06

## 2024-06-29 RX ADMIN — METOPROLOL TARTRATE 5 MG: 1 INJECTION, SOLUTION INTRAVENOUS at 02:06

## 2024-06-29 RX ADMIN — DRONEDARONE 400 MG: 400 TABLET, FILM COATED ORAL at 04:06

## 2024-06-29 RX ADMIN — METOPROLOL TARTRATE 5 MG: 1 INJECTION, SOLUTION INTRAVENOUS at 03:06

## 2024-06-29 NOTE — PLAN OF CARE
Problem: Adult Inpatient Plan of Care  Goal: Plan of Care Review  Outcome: Progressing  Goal: Patient-Specific Goal (Individualized)  Outcome: Progressing  Goal: Absence of Hospital-Acquired Illness or Injury  Outcome: Progressing  Goal: Optimal Comfort and Wellbeing  Outcome: Progressing  Goal: Readiness for Transition of Care  Outcome: Progressing     Problem: Hemodialysis  Goal: Effective Tissue Perfusion  Outcome: Progressing  Goal: Absence of Infection Signs and Symptoms  Outcome: Progressing     Problem: Skin Injury Risk Increased  Goal: Skin Health and Integrity  Outcome: Progressing     Problem: Fall Injury Risk  Goal: Absence of Fall and Fall-Related Injury  Outcome: Progressing     Problem: Dysrhythmia  Goal: Normalized Cardiac Rhythm  Outcome: Progressing     Problem: Chest Pain  Goal: Resolution of Chest Pain Symptoms  Outcome: Progressing

## 2024-06-29 NOTE — PLAN OF CARE
Patient cleared for discharge from case management standpoint.    Follow up appointments scheduled and added to AVS. Pt to resume HH with P[facility] Pt cleared to dc if stress test negative and cleared by cardiology     Chart and discharge orders reviewed.  Patient discharged home with no further case management needs.       06/29/24 1028   Final Note   Assessment Type Final Discharge Note   Anticipated Discharge Disposition [facility] Resources/Appts/Education Provided Provided patient/caregiver with written discharge plan information;Appointments scheduled and added to AVS   Post-Acute Status   Discharge Delays None known at this time

## 2024-06-29 NOTE — PROGRESS NOTES
[facility]  Department of Cardiology  Progress Note      PATIENT NAME: Tyra Isaac  MRN: [id number]  TODAY'S DATE: 06/29/2024  ADMIT DATE: 6/27/2024                          CONSULT REQUESTED BY: Fabián Altamirano MD    SUBJECTIVE     PRINCIPAL PROBLEM: Chest pain    06/29/2024  Patient seen today in stress lab and then later in the dialysis.  Patient had been tolerating dialysis well but then developed an elevated heart rate in the 160s which quickly settled down to the 110s after dialysis was stopped.  Patient reports having chest pain only whenever she has dialysis and does not have any with exertion.  Her nuclear stress test is negative for reversible ischemia.    REASON FOR CONSULT:  Chest pain, elevated troponin      HPI:    Patient is a 84-year-old female with history of atrial fibrillation on Eliquis, ESRD on dialysis Monday Wednesday Friday, CVA presents to emergency room with complaints of chest tightness x2 hours prior to arrival.  Chest pain resolved in ED but reports associated shortness of breath, nausea, vomiting, and dizziness when CP was occuring.  Hyperkalemia in ED with K of 6.5.  Nephrology consulted, stat dialysis performed.     Trivially elevated troponin HS.  39 > 61.  Chronically elevated troponin HS likely due to ESRD on HD. No acute ST-T wave changes.      During examination, patient is resting in bed.  Dialysis performed. Hypertensive this afternoon. SR on tele.  C/o HA.  CP improved.      ECHO 1/5/24    Left Ventricle: The left ventricle is normal in size. Normal wall thickness. Normal wall motion. There is normal systolic function with a visually estimated ejection fraction of 60 - 65%. There is diastolic dysfunction.    Right Ventricle: Normal right ventricular cavity size. Wall thickness is normal. Right ventricle wall motion  is normal. Systolic function is normal.    Left Atrium: Left atrium is severely dilated.    Mitral Valve: There is moderate bileaflet sclerosis.  There is moderate mitral annular calcification present. There is mild to moderate regurgitation with a centrally directed jet.    Tricuspid Valve: There is mild to moderate regurgitation.    Pulmonary Artery: There is mild pulmonary hypertension. The estimated pulmonary artery systolic pressure is 41 mmHg.    IVC/SVC: Normal venous pressure at 3 mmHg.    CATH 2/7/22  The left ventricular systolic function was normal.  The left ventricular end diastolic pressure was elevated.  The pre-procedure left ventricular end diastolic pressure was 20.  The post-procedure left ventricular end diastolic pressure was 31.  The ejection fraction was calculated to be 60%.  The estimated blood loss was none.  Left main is patent, lad patent moderate to severe tortuosity.  Left circumflex artery proximal stent is patent moderate to severe tortuosity, ramus intermedius is patent.  RCA is small caliber is patent moderate tortuosity. Non dominant vessel.        From H&P   Chest Pain       Pt says she has been having pain in the center of her chest for the last two hours.  Pt sad she threw up prior to the pain.  Pt has hx of afib         HPI: Patient is a 84-year-old female with history of atrial fibrillation on Eliquis, ESRD on dialysis Monday Wednesday Friday, CVA presents to emergency room with complaints of chest discomfort.  Patient reports chest discomfort started earlier today lasted about 2 hours, describes it as tightness radiating from the right to left side.  Resolved prior to coming to the emergency room.  Patient reports similar symptoms in the past.  Did have episode of emesis.  Potassium 6.5 on admission, ED physician spoke with Nephrology who ordered stat dialysis, ED physician placed hyperkalemia protocol.  Troponin 39.6.  Patient being admitted for chest pain workup.      Review of patient's allergies indicates:   Allergen Reactions    Cyclobenzaprine     Fish containing products Hives    Peanut Other (See Comments)     Tramadol Itching       Past Medical History:   Diagnosis Date    A-fib     Anxiety     Depression     Disorder of kidney and ureter     Encephalopathy acute 1/1/2018    End stage kidney disease 6/17/2017    Gout     Hyperlipidemia     Hypertension     Moderate episode of recurrent major depressive disorder 1/17/2018    Nephropathy hypertensive, 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    Osteopenia of multiple sites 3/9/2018    Based upon bone density measurements. Patient also has chronic kidney disease.    Stroke 11/2016     Past Surgical History:   Procedure Laterality Date    ANGIOGRAM, CORONARY, WITH LEFT HEART CATHETERIZATION N/A 2/7/2022    Procedure: Angiogram, Coronary, with Left Heart Cath;  Surgeon: Gino Leal MD;  Location: [facility] CATH/EP LAB;  Service: Cardiology;  Laterality: N/A;    CARDIAC SURGERY      stents    EYE SURGERY      WRIST SURGERY       Social History     Tobacco Use    Smoking status: Never    Smokeless tobacco: Never   Substance Use Topics    Alcohol use: No    Drug use: No        REVIEW OF SYSTEMS    As mentioned in HPI    OBJECTIVE     VITAL SIGNS (Most Recent)  Temp: 98.7 °F (37.1 °C) (06/29/24 1320)  Pulse: (!) 113 (06/29/24 1325)  Resp: 18 (06/29/24 1320)  BP: (!) 107/53 (06/29/24 1325)  SpO2: 99 % (06/29/24 1320)    VENTILATION STATUS  Resp: 18 (06/29/24 1320)  SpO2: 99 % (06/29/24 1320)           I & O (Last 24H):  Intake/Output Summary (Last 24 hours) at 6/29/2024 1340  Last data filed at 6/29/2024 1320  Gross per 24 hour   Intake 620 ml   Output 1500 ml   Net -880 ml       WEIGHTS  Wt Readings from Last 3 Encounters:   06/28/24 0300 48.7 kg (107 lb 5.8 oz)   06/27/24 2343 47.6 kg (105 lb)   06/28/24 1440 48.7 kg (107 lb 5.8 oz)   06/06/24 0809 46.7 kg (103 lb)       PHYSICAL EXAM    CONSTITUTIONAL: NAD, frail chronically ill appearing elderly  female  HEENT: Normocephalic. No pallor  NECK: no JVD  LUNGS: CTA b/l  HEART: regular rate and rhythm, S1, S2 normal, no murmur   ABDOMEN: sof  EXTREMITIES: No edema  SKIN: No rash  NEURO: AAO X 3  PSYCH: normal affect      HOME MEDICATIONS:  No current facility-administered medications on file prior to encounter.     Current Outpatient Medications on File Prior to Encounter   Medication Sig Dispense Refill    atorvastatin (LIPITOR) 40 MG tablet Take 40 mg by mouth once daily.      b complex vitamins tablet Take 1 tablet by mouth once daily.      calcium acetate,phosphat bind, (PHOSLO) 667 mg capsule Take 667 mg by mouth 3 (three) times daily with meals.      dorzolamide-timolol 2-0.5% (COSOPT) 22.3-6.8 mg/mL ophthalmic solution Place 1 drop into both eyes 2 (two) times daily.      dronedarone (MULTAQ) 400 mg Tab Take 1 tablet (400 mg total) by mouth 2 (two) times daily with meals. 180 tablet 0    ELIQUIS 2.5 mg Tab Take 2.5 mg by mouth 2 (two) times daily.      latanoprost 0.005 % ophthalmic solution Place 1 drop into both eyes every evening.      loperamide (IMODIUM A-D) 2 mg Tab Take 1 tablet (2 mg total) by mouth 4 (four) times daily as needed (diarrhea). 3 tablet 0    midodrine (PROAMATINE) 10 MG tablet Take 1 tablet (10 mg total) by mouth 3 (three) times daily with meals. 270 tablet 0    ondansetron (ZOFRAN-ODT) 4 MG TbDL Take 1 tablet (4 mg total) by mouth every 6 (six) hours as needed (nausea). 30 tablet 5       SCHEDULED MEDS:   apixaban  2.5 mg Oral BID    aspirin  81 mg Oral Daily    atorvastatin  40 mg Oral QHS    dronedarone  400 mg Oral BID WM    isosorbide mononitrate  30 mg Oral Daily    mupirocin   Nasal BID       CONTINUOUS INFUSIONS:    PRN MEDS:  Current Facility-Administered Medications:     acetaminophen, 650 mg, Oral, Q8H PRN    dextrose 50%, 12.5 g, Intravenous, PRN    dextrose 50%, 25 g, Intravenous, PRN    glucagon (human recombinant), 1 mg, Intramuscular, PRN    glucose, 16 g, Oral, PRN     "glucose, 24 g, Oral, PRN    hydrALAZINE, 10 mg, Intravenous, Q8H PRN    magnesium oxide, 800 mg, Oral, PRN    magnesium oxide, 800 mg, Oral, PRN    melatonin, 6 mg, Oral, Nightly PRN    ondansetron, 8 mg, Intravenous, Q8H PRN    potassium bicarbonate, 35 mEq, Oral, PRN    potassium bicarbonate, 50 mEq, Oral, PRN    potassium bicarbonate, 60 mEq, Oral, PRN    potassium, sodium phosphates, 2 packet, Oral, PRN    potassium, sodium phosphates, 2 packet, Oral, PRN    potassium, sodium phosphates, 2 packet, Oral, PRN    promethazine (PHENERGAN) 12.5 mg in 0.9% NaCl 50 mL IVPB, 12.5 mg, Intravenous, Q6H PRN    sodium chloride 0.9%, 10 mL, Intravenous, PRN    LABS AND DIAGNOSTICS     CBC LAST 3 DAYS  Recent Labs   Lab 06/28/24  0018 06/28/24  0553 06/28/24  1701 06/29/24  0525   WBC 5.91 5.87 5.84 9.18   RBC 5.03 4.62 5.71* 5.43*   HGB 13.2 12.0 14.9 14.2   HCT 45.5 41.0 51.0* 48.9*   MCV 91 89 89 90   MCH 26.2* 26.0* 26.1* 26.2*   MCHC 29.0* 29.3* 29.2* 29.0*   RDW 17.2* 17.1* 17.4* 18.0*    165 170 140*   MPV 9.7 10.1 9.9 9.9   GRAN 56.6  3.4 44.8  2.6  --  74.4*  6.8   LYMPH 17.3*  1.0 29.8  1.8  --  11.4*  1.1   MONO 15.6*  0.9 13.6  0.8  --  13.0  1.2*   BASO 0.02 0.02  --  0.03   NRBC 0 0  --  0       COAGULATION LAST 3 DAYS  No results for input(s): "LABPT", "INR", "APTT" in the last 168 hours.    CHEMISTRY LAST 3 DAYS  Recent Labs   Lab 06/28/24  0018 06/28/24  0552 06/28/24  1701    142 138   K 6.5* 5.4* 4.6   CL 98 99 95   CO2 30* 30* 30*   ANIONGAP 12 13 13   BUN 56* 58* 38*   CREATININE 9.2* 9.7* 7.7*   GLU 90 54* 120*   CALCIUM 9.6 9.5 9.5   MG 2.3  --   --    ALBUMIN 4.3 3.8 4.4   PROT 7.6 6.9 8.0   ALKPHOS 64 58 74   ALT 16 13 17   AST 21 17 25   BILITOT 0.4 0.4 0.6       CARDIAC PROFILE LAST 3 DAYS  Recent Labs   Lab 06/28/24  0018 06/28/24  0553 06/28/24  1349 06/28/24  1701   *  --   --   --    TROPONINIHS 39.6* 61.9* 52.2* 56.7*       ENDOCRINE LAST 3 DAYS  Recent Labs   Lab " "06/28/24  0553   TSH 1.151       LAST ARTERIAL BLOOD GAS  ABG  No results for input(s): "PH", "PO2", "PCO2", "HCO3", "BE" in the last 168 hours.    LAST 7 DAYS MICROBIOLOGY   Microbiology Results (last 7 days)       ** No results found for the last 168 hours. **            MOST RECENT IMAGING  Nuclear Stress Test    The ECG portion of the study is negative for ischemia.    The patient reported no chest pain during the stress test.    There were no arrhythmias during stress.    The nuclear portion of this study will be reported separately.  NM Myocardial Perfusion Spect Multi Pharmacologic  Narrative: EXAMINATION:  NM MYOCARDIAL PERFUSION SPECT MULTI PHARM    CLINICAL HISTORY:  CAD screening, high CAD risk, not treadmill candidate;    TECHNIQUE:  11.5 mCi technetium 99m tetrofosmin was administered IV for the rest portion of the exam, with 27 mCi for the stress portion of the exam, following a 1 day protocol. The patient was stressed with regadenoson 0.4 mg IV.    FINDINGS:  Comparison the prior exam of 03/21/2023. There is normal and homogeneous radiotracer uptake by the left ventricular myocardium, with no photopenic defects to suggest pharmacologically induced reversible ischemia or infarct. There are no wall motion abnormalities on the gated cine images, with no abnormal transient left ventricular dilatation on stress images.    Ejection fraction is calculated at 90 %. The polar map images confirm the planar SPECT findings.  Impression: 1.   No evidence of pharmacologically induced reversible ischemia or infarct.    2.   Normal left ventricular wall motion.    3.   Calculated ejection fraction of 90 %.    Electronically signed by: Aleksey Atkins  Date:    06/29/2024  Time:    11:01      ECHOCARDIOGRAM RESULTS (last 5)  Results for orders placed during the hospital encounter of 01/04/24    Echo    Interpretation Summary    Left Ventricle: The left ventricle is normal in size. Normal wall thickness. Normal wall " motion. There is normal systolic function with a visually estimated ejection fraction of 60 - 65%. There is diastolic dysfunction.    Right Ventricle: Normal right ventricular cavity size. Wall thickness is normal. Right ventricle wall motion  is normal. Systolic function is normal.    Left Atrium: Left atrium is severely dilated.    Mitral Valve: There is moderate bileaflet sclerosis. There is moderate mitral annular calcification present. There is mild to moderate regurgitation with a centrally directed jet.    Tricuspid Valve: There is mild to moderate regurgitation.    Pulmonary Artery: There is mild pulmonary hypertension. The estimated pulmonary artery systolic pressure is 41 mmHg.    IVC/SVC: Normal venous pressure at 3 mmHg.      Results for orders placed during the hospital encounter of 03/20/23    Echo    Interpretation Summary  · The left ventricle is normal in size with moderate concentric hypertrophy and normal systolic function.  · The estimated ejection fraction is 57%.  · Normal left ventricular diastolic function.  · Normal right ventricular size with normal right ventricular systolic function.  · Moderate to severe tricuspid regurgitation.  · Mild-to-moderate mitral regurgitation.  · Moderate left atrial enlargement.  · Mild right atrial enlargement.  · There is mild pulmonary hypertension.      Results for orders placed during the hospital encounter of 03/06/23    Echo    Interpretation Summary  · The left ventricle is normal in size with normal systolic function.  · The estimated ejection fraction is 60%.  · Grade II left ventricular diastolic dysfunction.  · Normal right ventricular size with normal right ventricular systolic function.  · Moderate left atrial enlargement.  · Mild-to-moderate mitral regurgitation.  · Moderate to severe tricuspid regurgitation.  · There is mild pulmonary hypertension.  · There is mild aortic valve stenosis.  · Aortic valve area is 1.78 cm2; peak velocity is 2.01  m/s; mean gradient is 9 mmHg.      Results for orders placed during the hospital encounter of 01/26/22    Echo    Interpretation Summary  · The left ventricle is normal in size with mild concentric hypertrophy and normal systolic function.  · The estimated ejection fraction is 65%.  · Grade II left ventricular diastolic dysfunction.  · Atrial fibrillation not observed.  · Normal right ventricular size with normal right ventricular systolic function.  · Moderate to severe left atrial enlargement.  · Mild right atrial enlargement.  · Mild mitral regurgitation.  · Mild to moderate tricuspid regurgitation.  · Normal central venous pressure (3 mmHg).  · The estimated PA systolic pressure is 36 mmHg.  · Mildly elevated gradient accross mitral valve of around 6 mmHg at HR of 64 bpm. MVA by pressure half time is normal, however this can be inaccurate.      Results for orders placed during the hospital encounter of 03/03/21    Echo Color Flow Doppler? Yes    Interpretation Summary  · The left ventricle is small with concentric remodeling and normal systolic function. The estimated ejection fraction is 63%  · The estimated PA systolic pressure is 38 mmHg.  · Grade II left ventricular diastolic dysfunction.  · Normal right ventricular size with normal right ventricular systolic function.  · Mild-to-moderate mitral regurgitation.  · Mild to moderate tricuspid regurgitation.  · Normal central venous pressure (3 mmHg).  · The patient converted from atrial fibrillation to normal sinus rhythm 03/03/2021      CURRENT/PREVIOUS VISIT EKG  Results for orders placed or performed during the hospital encounter of 06/27/24   EKG 12-lead    Collection Time: 06/28/24 12:35 AM   Result Value Ref Range    QRS Duration 76 ms    [facility] QTC Calculation 482 ms    Narrative    Test Reason : R07.9,    Vent. Rate : 066 BPM     Atrial Rate : 066 BPM     P-R Int : 172 ms          QRS Dur : 076 ms      QT Int : 460 ms       P-R-T Axes : 075 036 -02  degrees     QTc Int : 482 ms    Normal sinus rhythm  Anteroseptal infarct (cited on or before 27-JUN-2024)  T wave abnormality, consider lateral ischemia  Abnormal ECG  When compared with ECG of 27-JUN-2024 23:43,  Serial changes of Anteroseptal infarct Present    Referred By: KUNAL   SELF           Confirmed By:            ASSESSMENT/PLAN:     Active Hospital Problems    Diagnosis    *Chest pain    Hyperkalemia    ESRD (end stage renal disease)    Atrial fibrillation    Syncope and collapse       ASSESSMENT & PLAN:     Chest Pain  Hyperkalemia  Elevated troponin HS- mild  ESRD on HD  PAF  SVT    RECOMMENDATIONS:    Continue Multaq 40 mg p.o. b.i.d. and Imdur 30 mg p.o. daily.  Patient is bradycardic at times and this may be why she has not on a beta-blocker.  However will start her on a low dose of metoprolol and see how she does.  Continue Eliquis 2.5 mg p.o. b.i.d..        Jacquie Carter NP  Department of Cardiology  Date of Service: 06/29/2024        I have personally interviewed and examined the patient, I have reviewed the Nurse Practitioner's history and physical, assessment, and plan. I have personally evaluated the patient at bedside and agree with the findings and made appropriate changes as necessary in recommendations.  Pt had SVT after HD resolved with IV BB  Start PO BB  Cont AC  NST negative for ischemia   D/w medicine    Heather Smith MD  Department of Cardiology  [facility]  6/29/24

## 2024-06-29 NOTE — PROGRESS NOTES
INPATIENT NEPHROLOGY Progress  [facility] NEPHROLOGY    Tyra Isaac  06/29/2024    Reason for consultation:    esrd    Chief Complaint:   Chief Complaint   Patient presents with    Chest Pain     Pt says she has been having pain in the center of her chest for the last two hours.  Pt sad she threw up prior to the pain.  Pt has hx of afib          History of Present Illness:    Per H and P   Patient is a 84-year-old female with history of atrial fibrillation on Eliquis, ESRD on dialysis Monday Wednesday Friday, CVA presents to emergency room with complaints of chest discomfort.  Patient reports chest discomfort started earlier today lasted about 2 hours, describes it as tightness radiating from the right to left side.  Resolved prior to coming to the emergency room.  Patient reports similar symptoms in the past.  Did have episode of emesis.  Potassium 6.5 on admission, ED physician spoke with Nephrology who ordered stat dialysis, ED physician placed hyperkalemia protocol.  Troponin 39.6.  Patient being admitted for chest pain workup.     6/28  Patient seen on hemodialysis for uremic clearance and ultrafiltration.    Cp resolved.  No sob.      Addendum:  --about 30 minutes after I saw her she had a syncopal episode.  Her blood sugar was 90.   Her blood was rinsed back.   She never became pulseless.  She woke up after the blood was returned.  She briefly had hypotension.  Her bp came up to 150s/80s.   She woke up and was cognitive and appropriate.  No chest pain or sob.   She received about an hour and a half of dialysis  6/29  665cc off w/HD yesterday.  VSS, no chemistry results.  LADI for stress test, then will have dialysis--only got about an hour and a half of yesterday's tx.    Plan of Care:       Assessment:      esrd  --continue dialysis per routine  --fluid restrict  --renal dose medication per routine  --continue outpt medication  --continue binders with meals    Anemia  --erythropoiesis stimulating  agent with renal replacement therapy when hg less than 10    Hyperphosphatemia  --renal diet  --continue binders    Hypertension  --uf with hd  --fluid restrict  --low salt diet  --continue home medication    Syncope  --likely hemodynamically mediated due to ultrafiltration  --monitor on telemetry  --echo planned  --cardiology has been consulted to evaluate cp.     Hyperkalemia  --shifted last night  --2 k bath  --surveillance k later this afternoon    Thank you for allowing us to participate in this patient's care. We will continue to follow.    Vital Signs:  Temp Readings from Last 3 Encounters:   06/29/24 99.2 °F (37.3 °C) (Oral)   06/03/24 98.5 °F (36.9 °C) (Oral)   03/19/24 97.4 °F (36.3 °C) (Tympanic)       Pulse Readings from Last 3 Encounters:   06/29/24 93   06/06/24 71   06/03/24 69       BP Readings from Last 3 Encounters:   06/29/24 (!) 137/59   06/06/24 (!) 122/56   06/03/24 130/60       Weight:  Wt Readings from Last 3 Encounters:   06/28/24 48.7 kg (107 lb 5.8 oz)   06/28/24 48.7 kg (107 lb 5.8 oz)   06/06/24 46.7 kg (103 lb)       Past Medical & Surgical History:  Past Medical History:   Diagnosis Date    A-fib     Anxiety     Depression     Disorder of kidney and ureter     Encephalopathy acute 1/1/2018    End stage kidney disease 6/17/2017    Gout     Hyperlipidemia     Hypertension     Moderate episode of recurrent major depressive disorder 1/17/2018    Nephropathy hypertensive, 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    Osteopenia of multiple sites 3/9/2018    Based upon bone density measurements. Patient also has chronic kidney disease.    Stroke 11/2016       Past Surgical History:   Procedure Laterality Date    ANGIOGRAM, CORONARY, WITH LEFT HEART CATHETERIZATION N/A 2/7/2022    Procedure: Angiogram, Coronary, with Left Heart Cath;  Surgeon: Gino Leal MD;   Location: [facility] CATH/EP LAB;  Service: Cardiology;  Laterality: N/A;    CARDIAC SURGERY      stents    EYE SURGERY      WRIST SURGERY         Past Social History:  Social History     Socioeconomic History    Marital status:      Spouse name: Aria Isaac    Number of children: 3   Occupational History    Occupation: Not working   Tobacco Use    Smoking status: Never    Smokeless tobacco: Never   Substance and Sexual Activity    Alcohol use: No    Drug use: No    Sexual activity: Not Currently     Social Determinants of Health     Financial Resource Strain: Medium Risk (6/15/2022)    Overall Financial Resource Strain (CARDIA)     Difficulty of Paying Living Expenses: Somewhat hard   Food Insecurity: No Food Insecurity (6/15/2022)    Hunger Vital Sign     Worried About Running Out of Food in the Last Year: Never true     Ran Out of Food in the Last Year: Never true   Transportation Needs: Unmet Transportation Needs (3/29/2023)    PRAPARE - Transportation     Lack of Transportation (Medical): Yes     Lack of Transportation (Non-Medical): No   Physical Activity: Inactive (6/15/2022)    Exercise Vital Sign     Days of Exercise per Week: 0 days     Minutes of Exercise per Session: 0 min   Stress: Stress Concern Present (6/15/2022)    [employer] [place] - Occupational Stress Questionnaire     Feeling of Stress : To some extent   Housing Stability: Low Risk  (6/15/2022)    Housing Stability Vital Sign     Unable to Pay for Housing in the Last Year: No     Number of Places Lived in the Last Year: 1     Unstable Housing in the Last Year: No       Medications:  No current facility-administered medications on file prior to encounter.     Current Outpatient Medications on File Prior to Encounter   Medication Sig Dispense Refill    atorvastatin (LIPITOR) 40 MG tablet Take 40 mg by mouth once daily.      b complex vitamins tablet Take 1 tablet by mouth once daily.      calcium acetate,phosphat  bind, (PHOSLO) 667 mg capsule Take 667 mg by mouth 3 (three) times daily with meals.      dorzolamide-timolol 2-0.5% (COSOPT) 22.3-6.8 mg/mL ophthalmic solution Place 1 drop into both eyes 2 (two) times daily.      dronedarone (MULTAQ) 400 mg Tab Take 1 tablet (400 mg total) by mouth 2 (two) times daily with meals. 180 tablet 0    ELIQUIS 2.5 mg Tab Take 2.5 mg by mouth 2 (two) times daily.      latanoprost 0.005 % ophthalmic solution Place 1 drop into both eyes every evening.      loperamide (IMODIUM A-D) 2 mg Tab Take 1 tablet (2 mg total) by mouth 4 (four) times daily as needed (diarrhea). 3 tablet 0    midodrine (PROAMATINE) 10 MG tablet Take 1 tablet (10 mg total) by mouth 3 (three) times daily with meals. 270 tablet 0    ondansetron (ZOFRAN-ODT) 4 MG TbDL Take 1 tablet (4 mg total) by mouth every 6 (six) hours as needed (nausea). 30 tablet 5     Scheduled Meds:   apixaban  2.5 mg Oral BID    aspirin  81 mg Oral Daily    atorvastatin  40 mg Oral QHS    dronedarone  400 mg Oral BID WM    isosorbide mononitrate  30 mg Oral Daily    mupirocin   Nasal BID     Continuous Infusions:  PRN Meds:.  Current Facility-Administered Medications:     acetaminophen, 650 mg, Oral, Q8H PRN    dextrose 50%, 12.5 g, Intravenous, PRN    dextrose 50%, 25 g, Intravenous, PRN    glucagon (human recombinant), 1 mg, Intramuscular, PRN    glucose, 16 g, Oral, PRN    glucose, 24 g, Oral, PRN    hydrALAZINE, 10 mg, Intravenous, Q8H PRN    magnesium oxide, 800 mg, Oral, PRN    magnesium oxide, 800 mg, Oral, PRN    melatonin, 6 mg, Oral, Nightly PRN    ondansetron, 8 mg, Intravenous, Q8H PRN    potassium bicarbonate, 35 mEq, Oral, PRN    potassium bicarbonate, 50 mEq, Oral, PRN    potassium bicarbonate, 60 mEq, Oral, PRN    potassium, sodium phosphates, 2 packet, Oral, PRN    potassium, sodium phosphates, 2 packet, Oral, PRN    potassium, sodium phosphates, 2 packet, Oral, PRN    promethazine (PHENERGAN) 12.5 mg in 0.9% NaCl 50 mL IVPB, 12.5  "mg, Intravenous, Q6H PRN    sodium chloride 0.9%, 10 mL, Intravenous, PRN    Allergies:  Cyclobenzaprine, Fish containing products, Peanut, and Tramadol    Past Family History:  Reviewed; refer to Hospitalist Admission Note    Review of Systems:  Review of Systems - All 14 systems reviewed and negative, except as noted in HPI    Physical Exam:    BP (!) 137/59 (BP Location: Left arm, Patient Position: Lying)   Pulse 93   Temp 99.2 °F (37.3 °C) (Oral)   Resp 18   Ht 5' 5" (1.651 m)   Wt 48.7 kg (107 lb 5.8 oz)   SpO2 97%   BMI 17.87 kg/m²     General Appearance:    Alert, cooperative, no distress, appears stated age   Head:    Normocephalic, without obvious abnormality, atraumatic   Eyes:    PER, conjunctiva/corneas clear, EOM's intact in both eyes        Throat:   Lips, mucosa, and tongue normal; teeth and gums normal   Back:     Symmetric, no curvature, ROM normal, no CVA tenderness   Lungs:     Clear to auscultation bilaterally, respirations unlabored   Chest wall:    No tenderness or deformity   Heart:    Regular rate and rhythm, S1 and S2 normal, no murmur, rub   or gallop   Abdomen:     Soft, non-tender, bowel sounds active all four quadrants,     no masses, no organomegaly   Extremities:   Extremities normal, atraumatic, no cyanosis or edema   Pulses:   2+ and symmetric all extremities   MSK:   No joint or muscle swelling, tenderness or deformity   Skin:   Skin color, texture, turgor normal, no rashes or lesions   Neurologic:   CNII-XII intact, normal strength and sensation       Throughout.  No flap     Results:  Lab Results   Component Value Date     06/28/2024    K 4.6 06/28/2024    CL 95 06/28/2024    CO2 30 (H) 06/28/2024    BUN 38 (H) 06/28/2024    CREATININE 7.7 (H) 06/28/2024    CALCIUM 9.5 06/28/2024    ANIONGAP 13 06/28/2024    ESTGFRAFRICA 4.7 (A) 02/08/2022    EGFRNONAA 4.0 (A) 02/08/2022       Lab Results   Component Value Date    CALCIUM 9.5 06/28/2024    PHOS 3.1 03/16/2024 "       Recent Labs   Lab 06/29/24  0525   WBC 9.18   RBC 5.43*   HGB 14.2   HCT 48.9*   *   MCV 90   MCH 26.2*   MCHC 29.0*        Imaging Results              NM Myocardial Perfusion Spect Multi Exer (In process)                      X-Ray Chest AP Portable (Final result)  Result time 06/28/24 01:10:09      Final result by Aldo Espinosa MD (06/28/24 01:10:09)                   Impression:      Radiographic findings as above.      Electronically signed by: Aldo Espinosa  Date:    06/28/2024  Time:    01:10               Narrative:    EXAMINATION:  XR CHEST AP PORTABLE    CLINICAL HISTORY:  Chest Pain;    TECHNIQUE:  Single frontal view of the chest was performed.    COMPARISON:  Prior examinations, most recent of which is March 19, 2024    FINDINGS:  Single portable chest view is submitted.  When accounting for position and technique and depth of inspiration the appearance of the cardiomediastinal silhouette is stable.  Aortic atherosclerotic change and mild tortuosity is noted.    Mild accentuation of interstitial markings may relate to a pattern of mild interstitial infiltrate/edema.  There is no focal consolidation, there is no pleural effusion or pneumothorax.  Skin folds overlie the patient.    The osseous structures demonstrate chronic change.                        Wet Read by Jacquie Wang MD (06/28/24 00:32:39, [facility] - Emergency Dept, Emergency Medicine)    No pulmonary infiltrates no pulmonary edema no cardiomegaly no pleural effusion                                    Patient care was time spent personally by me on the following activities:   Obtaining a history  Examination of patient.  Providing medical care at the patients bedside.  Developing a treatment plan with patient or surrogate and bedside caregivers  Ordering and reviewing laboratory studies, radiographic studies, pulse oximetry.  Ordering and performing treatments and interventions.  Evaluation of  patient's response to treatment.  Discussions with consultants while on the unit and immediately available to the patient.  Re-evaluation of the patient's condition.  Documentation in the medical record.       I have personally reviewed pertinent radiological imaging and reports.    Arpita Salas NP    [facility] [place]  [phone]

## 2024-06-29 NOTE — PROGRESS NOTES
06/29/24 1320   Handoff Report   Given To Azul REYES   Vital Signs   Temp 98.7 °F (37.1 °C)   Temp Source Oral   Pulse (!) 157   Resp 18   SpO2 99 %   /70   During Hemodialysis Assessment   Blood Volume Processed (Liters) 35 L   UF Removed (mL) 1300 mL   Intake (mL) 250 mL   Intra-Hemodialysis Comments Blood rinsed back per protocol. Dr Maynard notified of heart rate and Azul REYES notified  (Instructed Romain  let son know.)   Post-Hemodialysis Assessment   Rinseback Volume (mL) 250 mL   Blood Volume Processed (Liters) 35 L   Dialyzer Clearance Lightly streaked   Duration of Treatment 130 minutes   Additional Fluid Intake (mL) 500 mL   Total UF (mL) 1300 mL   Net Fluid Removal 800   Patient Response to Treatment Did not tolerate well  (Heart rate up to 165 on tele monitor. Blood rinsed back per protocol.  Dr. Maynard notified and Azul REYES notified.)   Post-Treatment Weight 48 kg (105 lb 13.1 oz)   Treatment Weight Change -0.7   Arterial bleeding stop time (min) 8 min   Venous bleeding stop time (min) 8 min   Post-Hemodialysis Comments Tx ended due to elevated heart rate. Blood rinsed back per protocol.

## 2024-06-29 NOTE — ASSESSMENT & PLAN NOTE
On Eliquis and Multaq outpatient - continue  Had episode of tachycardia during dialysis - EKG ordered.- Atrial fib noted.  Cardiology following.

## 2024-06-29 NOTE — NURSING
1430 pt's HR in the 180's, EKG done, Dr Smith and Jacquie Carter notified. 5 MG of Metoprolol IV given per order. No change, Rapid response called. During rapid - 5 MG of Diltiazem IV given with 250 ml NS bolus. Pt transported to ICU with all personal belongings. Pt's son, Raffi, notified.

## 2024-06-29 NOTE — ASSESSMENT & PLAN NOTE
Troponin 39.6 --> 61.9  Follow up echocardiogram   Stress test ordered - negative for reversible ischemia  Started on aspirin daily  Follows with Dr. Thompson outpatient - will consult cardiology  Follow up lipid panel - reviewed, TSH - wnl

## 2024-06-29 NOTE — CARE UPDATE
Patient had severe tachycardia after dialysis .  Patient with h/o a fib but not on beta blocker for unsure reason .  Appear P wave + at this time and sinus tachy but rate in SVT range .   Iv lopressor 5 mg did not do much and cardizem 5 given. NS bolus 500 cc given and HR improved. She had RRT the same yesterday and moved to ICU . Code status need to re address later on . Dr Smith also at bedside . Added low dose lopressor 12.5 mg bd.   Off note patient had stress test and negative . Possible stress agent caused tachycardia.

## 2024-06-29 NOTE — NURSING
Nurses Note -- 4 Eyes      6/29/2024   4:33 PM      Skin assessed during: Transfer      [x] No Altered Skin Integrity Present    [x]Prevention Measures Documented    Right upper arm fistula  [] Yes- Altered Skin Integrity Present or Discovered   [] LDA Added if Not in Epic (Describe Wound)   [] New Altered Skin Integrity was Present on Admit and Documented in LDA   [] Wound Image Taken    Wound Care Consulted? No    Attending Nurse:  Kelle Henao RN/Staff Member:  AMY Liang on [address] wing

## 2024-06-29 NOTE — HOSPITAL COURSE
Patient was admitted with chest pain and hyperkalemia.  Her EKG was nonischemic and her troponins were noted to be elevated, which is baseline for her 2/2 ESRD. She remained chest pain free during her stay.  Nephrology was consulted for routine dialysis.  She was maintained on telemetry and cardiology was consulted.  She received medications to shift her potassium and dialysis was ordered.  Her dialysis was cut short due to syncopal episode with hypotension.  She was monitored overnight.  She went for stress testing, which was negative for reversible ischemia.  She had another episode of severe tachycardia after dialysis and needed Lopressor and Cardizem IV and moved to ICU.  On 7/1 during dialysis patient became tachycardic and hypotensive in afib RVR. She was cardioverted and started on amio drip and phenylephrine drip.  Palliative Care was consulted, patient wishes to remain full code at this time until she discusses it further with her family.  Patient's heart rate and BP remained stable.  The amiodarone drip was transitioned to PO and patient was transferred out of ICU on 7/4/24.  Patient had hemodialysis 7/5/24 and tolerated this well.  PT/OT was consulted who recommended home health which was ordered.  Patient seen and examined on day of discharge and deemed stable for discharge home with home health.  She will need to follow up with her PCP, Cardiologist, and Nephrologist in 1 week.  Patient agreeable with plan and eager for discharge home.

## 2024-06-29 NOTE — PLAN OF CARE
Resumption of  orders sent to [facility]      06/29/24 0203   Post-Acute Status   Post-Acute Authorization Home Health   Discharge Plan   Discharge Plan A Home Health   Discharge Plan B [facility]

## 2024-06-29 NOTE — NURSING
Pt resting comfortably in bed, NPO for stress test this am. Stated that she had a good night and would like to get home today. No other needs at this time. Will continue to monitor     1030 pt back from stress test via wheelchair. Very shaky while transferring to bed x 2 assist. Assisted with breakfast tray. Will go to dialysis when finished eating.     1320 pt in dialysis, HR went up to 167 for a few minutes. EKG ordered, AHMET Hua NP notified,     1347 Pt's son, Raffi, notified that pt will not be discharging today at pt's request

## 2024-06-29 NOTE — PROGRESS NOTES
[facility] Medicine  Progress Note    Patient Name: Tyra Isaac  MRN: [id number]  Patient Class: IP- Inpatient   Admission Date: 6/27/2024  Length of Stay: 1 days  Attending Physician: Fabián Altamirano MD  Primary Care Provider: Kalpesh Goel MD        Subjective:     Principal Problem:Chest pain        HPI:  Patient is a 84-year-old female with history of atrial fibrillation on Eliquis, ESRD on dialysis Monday Wednesday Friday, CVA presents to emergency room with complaints of chest discomfort.  Patient reports chest discomfort started earlier today lasted about 2 hours, describes it as tightness radiating from the right to left side.  Resolved prior to coming to the emergency room.  Patient reports similar symptoms in the past.  Did have episode of emesis.  Potassium 6.5 on admission, ED physician spoke with Nephrology who ordered stat dialysis, ED physician placed hyperkalemia protocol.  Troponin 39.6.  Patient being admitted for chest pain workup.    Overview/Hospital Course:  Patient was admitted with chest pain and hyperkalemia.  Her EKG was nonischemic and her troponins were noted to be elevated, which is baseline for her 2/2 ESRD. She remained chest pain free during her stay.  Nephrology was consulted for routine dialysis.  She was maintained on telemetry and cardiology was consulted.  She received medications to shift her potassium and dialysis was ordered.  Her dialysis was cut short due to syncopal episode with hypotension.  She was monitored overnight.  She went for stress testing, which was negative for reversible ischemia.  Dialysis was performed the next day and had to be stopped secondary to SVT.      Interval History:  Patient seen and examined after stress testing.  Feels nervous and is having some tremors.  Patient reports this has happened to her in the past and sought neurology consult, but it has been about 3 years and she never finished testing.  She has not had  any further chest pain.     Update: On dialysis patient heart rate increased to high 160's.  EKG has been ordered and nursing to reach out to cardiology team.  She is not in acute distress.  Dialysis aborted again.    Review of Systems   Respiratory:  Positive for chest tightness.    Cardiovascular:  Positive for chest pain.   Neurological:  Positive for tremors.   All other systems reviewed and are negative.    Objective:     Vital Signs (Most Recent):  Temp: 98.3 °F (36.8 °C) (06/29/24 1403)  Pulse: (!) 111 (06/29/24 1403)  Resp: 18 (06/29/24 1403)  BP: (!) 113/53 (06/29/24 1403)  SpO2: 95 % (06/29/24 1403) Vital Signs (24h Range):  Temp:  [87.7 °F (30.9 °C)-99.6 °F (37.6 °C)] 98.3 °F (36.8 °C)  Pulse:  [] 111  Resp:  [16-18] 18  SpO2:  [92 %-99 %] 95 %  BP: ()/() 113/53     Weight: 48.7 kg (107 lb 5.8 oz)  Body mass index is 17.87 kg/m².    Intake/Output Summary (Last 24 hours) at 6/29/2024 1415  Last data filed at 6/29/2024 1320  Gross per 24 hour   Intake 620 ml   Output 1500 ml   Net -880 ml         Physical Exam  Constitutional:       General: She is not in acute distress.     Appearance: She is ill-appearing (appears chronically ill). She is not toxic-appearing or diaphoretic.      Comments: Frail and cachectic   HENT:      Head: Normocephalic and atraumatic.   Cardiovascular:      Rate and Rhythm: Normal rate and regular rhythm.      Pulses: Normal pulses.      Heart sounds: Normal heart sounds.      Comments: Right upper arm fistula presently accessed for dialysis.  Pulmonary:      Effort: Pulmonary effort is normal. No respiratory distress.      Breath sounds: Normal breath sounds.   Abdominal:      General: Bowel sounds are normal. There is no distension.      Palpations: Abdomen is soft.      Tenderness: There is no abdominal tenderness. There is no guarding.   Musculoskeletal:         General: Normal range of motion.      Right lower leg: No edema.      Left lower leg: No edema.    Skin:     General: Skin is warm and dry.      Coloration: Skin is not jaundiced.   Neurological:      Mental Status: She is alert and oriented to person, place, and time. Mental status is at baseline.             Significant Labs: All pertinent labs within the past 24 hours have been reviewed.  CBC:   Recent Labs   Lab 06/28/24  0553 06/28/24  1701 06/29/24  0525   WBC 5.87 5.84 9.18   HGB 12.0 14.9 14.2   HCT 41.0 51.0* 48.9*    170 140*     CMP:   Recent Labs   Lab 06/28/24  0018 06/28/24  0552 06/28/24  1701    142 138   K 6.5* 5.4* 4.6   CL 98 99 95   CO2 30* 30* 30*   GLU 90 54* 120*   BUN 56* 58* 38*   CREATININE 9.2* 9.7* 7.7*   CALCIUM 9.6 9.5 9.5   PROT 7.6 6.9 8.0   ALBUMIN 4.3 3.8 4.4   BILITOT 0.4 0.4 0.6   ALKPHOS 64 58 74   AST 21 17 25   ALT 16 13 17   ANIONGAP 12 13 13     Magnesium:   Recent Labs   Lab 06/28/24  0018   MG 2.3     Troponin:   Recent Labs   Lab 06/28/24  0553 06/28/24  1349 06/28/24  1701   TROPONINIHS 61.9* 52.2* 56.7*       Significant Imaging: I have reviewed all pertinent imaging results/findings within the past 24 hours.    Assessment/Plan:      * Chest pain  Troponin 39.6 --> 61.9  Follow up echocardiogram   Stress test ordered - negative for reversible ischemia  Started on aspirin daily  Follows with Dr. Thompson outpatient - will consult cardiology  Follow up lipid panel - reviewed, TSH - wnl        Hyperkalemia  This patient has hyperkalemia which is uncontrolled. We will monitor for arrhythmias with EKG or continuous telemetry. We will treat the hyperkalemia with IV insulin and dextrose and Nebulized albuterol sulfate. The likely etiology of the hyperkalemia is ESRD.  The patients latest potassium has been reviewed and the results are listed below  Recent Labs   Lab 06/28/24  0552   K 5.4*     Stat dialysis today          ESRD (end stage renal disease)  Creatine stable for now. BMP reviewed- noted Estimated Creatinine Clearance: 3.3 mL/min (A) (based on SCr of  9.7 mg/dL (H)). according to latest data. Based on current GFR, CKD stage is end stage.  Monitor UOP and serial BMP and adjust therapy as needed. Renally dose meds. Avoid nephrotoxic medications and procedures.  Nephrology consulted    Atrial fibrillation  On Eliquis and Multaq outpatient - continue  Had episode of tachycardia during dialysis - EKG ordered.- Atrial fib noted.  Cardiology following.     Syncope and collapse  Secondary to dialysis and hypotension  rapid response called  Blood glucose 90   Hypotensive and blood rinsed back to patient with good resolve of symptoms.  Continue to monitor vital signs closely.        VTE Risk Mitigation (From admission, onward)           Ordered     apixaban tablet 2.5 mg  2 times daily         06/28/24 0207     IP VTE HIGH RISK PATIENT  Once         06/28/24 0207     Place sequential compression device  Until discontinued         06/28/24 0207                    Discharge Planning   ILAN: 6/29/2024     Code Status: Full Code   Is the patient medically ready for discharge?:     Reason for patient still in hospital (select all that apply): Patient trending condition, Treatment, and Consult recommendations  Discharge Plan A: Home Health   Discharge Delays: None known at this time              Maryjane Hua NP  Department of Hospital Medicine   [facility]

## 2024-06-29 NOTE — SUBJECTIVE & OBJECTIVE
Interval History:  Patient seen and examined after stress testing.  Feels nervous and is having some tremors.  Patient reports this has happened to her in the past and sought neurology consult, but it has been about 3 years and she never finished testing.  She has not had any further chest pain.     Update: On dialysis patient heart rate increased to high 160's.  EKG has been ordered and nursing to reach out to cardiology team.  She is not in acute distress.  Dialysis aborted again.    Review of Systems   Respiratory:  Positive for chest tightness.    Cardiovascular:  Positive for chest pain.   Neurological:  Positive for tremors.   All other systems reviewed and are negative.    Objective:     Vital Signs (Most Recent):  Temp: 98.3 °F (36.8 °C) (06/29/24 1403)  Pulse: (!) 111 (06/29/24 1403)  Resp: 18 (06/29/24 1403)  BP: (!) 113/53 (06/29/24 1403)  SpO2: 95 % (06/29/24 1403) Vital Signs (24h Range):  Temp:  [87.7 °F (30.9 °C)-99.6 °F (37.6 °C)] 98.3 °F (36.8 °C)  Pulse:  [] 111  Resp:  [16-18] 18  SpO2:  [92 %-99 %] 95 %  BP: ()/() 113/53     Weight: 48.7 kg (107 lb 5.8 oz)  Body mass index is 17.87 kg/m².    Intake/Output Summary (Last 24 hours) at 6/29/2024 1415  Last data filed at 6/29/2024 1320  Gross per 24 hour   Intake 620 ml   Output 1500 ml   Net -880 ml         Physical Exam  Constitutional:       General: She is not in acute distress.     Appearance: She is ill-appearing (appears chronically ill). She is not toxic-appearing or diaphoretic.      Comments: Frail and cachectic   HENT:      Head: Normocephalic and atraumatic.   Cardiovascular:      Rate and Rhythm: Normal rate and regular rhythm.      Pulses: Normal pulses.      Heart sounds: Normal heart sounds.      Comments: Right upper arm fistula presently accessed for dialysis.  Pulmonary:      Effort: Pulmonary effort is normal. No respiratory distress.      Breath sounds: Normal breath sounds.   Abdominal:      General: Bowel  sounds are normal. There is no distension.      Palpations: Abdomen is soft.      Tenderness: There is no abdominal tenderness. There is no guarding.   Musculoskeletal:         General: Normal range of motion.      Right lower leg: No edema.      Left lower leg: No edema.   Skin:     General: Skin is warm and dry.      Coloration: Skin is not jaundiced.   Neurological:      Mental Status: She is alert and oriented to person, place, and time. Mental status is at baseline.             Significant Labs: All pertinent labs within the past 24 hours have been reviewed.  CBC:   Recent Labs   Lab 06/28/24  0553 06/28/24  1701 06/29/24  0525   WBC 5.87 5.84 9.18   HGB 12.0 14.9 14.2   HCT 41.0 51.0* 48.9*    170 140*     CMP:   Recent Labs   Lab 06/28/24  0018 06/28/24  0552 06/28/24  1701    142 138   K 6.5* 5.4* 4.6   CL 98 99 95   CO2 30* 30* 30*   GLU 90 54* 120*   BUN 56* 58* 38*   CREATININE 9.2* 9.7* 7.7*   CALCIUM 9.6 9.5 9.5   PROT 7.6 6.9 8.0   ALBUMIN 4.3 3.8 4.4   BILITOT 0.4 0.4 0.6   ALKPHOS 64 58 74   AST 21 17 25   ALT 16 13 17   ANIONGAP 12 13 13     Magnesium:   Recent Labs   Lab 06/28/24  0018   MG 2.3     Troponin:   Recent Labs   Lab 06/28/24  0553 06/28/24  1349 06/28/24  1701   TROPONINIHS 61.9* 52.2* 56.7*       Significant Imaging: I have reviewed all pertinent imaging results/findings within the past 24 hours.

## 2024-06-29 NOTE — NURSING NOTE
Lexiscan portion of Stress Test completed without complications. Patient verbalized understanding of pre-post test instructions. Discharged from lab per Cardiology.

## 2024-06-30 DIAGNOSIS — I48.91 ATRIAL FIBRILLATION WITH RAPID VENTRICULAR RESPONSE: Primary | ICD-10-CM

## 2024-06-30 LAB
ALBUMIN SERPL BCP-MCNC: 3.6 G/DL (ref 3.5–5.2)
ALBUMIN SERPL BCP-MCNC: 3.7 G/DL (ref 3.5–5.2)
ALP SERPL-CCNC: 59 U/L (ref 55–135)
ALT SERPL W/O P-5'-P-CCNC: 12 U/L (ref 10–44)
ANION GAP SERPL CALC-SCNC: 10 MMOL/L (ref 8–16)
ANION GAP SERPL CALC-SCNC: 12 MMOL/L (ref 8–16)
AST SERPL-CCNC: 16 U/L (ref 10–40)
BASOPHILS # BLD AUTO: 0.03 K/UL (ref 0–0.2)
BASOPHILS NFR BLD: 0.4 % (ref 0–1.9)
BILIRUB SERPL-MCNC: 0.5 MG/DL (ref 0.1–1)
BUN SERPL-MCNC: 38 MG/DL (ref 8–23)
BUN SERPL-MCNC: 42 MG/DL (ref 8–23)
CALCIUM SERPL-MCNC: 8.9 MG/DL (ref 8.7–10.5)
CALCIUM SERPL-MCNC: 8.9 MG/DL (ref 8.7–10.5)
CHLORIDE SERPL-SCNC: 100 MMOL/L (ref 95–110)
CHLORIDE SERPL-SCNC: 102 MMOL/L (ref 95–110)
CO2 SERPL-SCNC: 24 MMOL/L (ref 23–29)
CO2 SERPL-SCNC: 25 MMOL/L (ref 23–29)
CREAT SERPL-MCNC: 7.5 MG/DL (ref 0.5–1.4)
CREAT SERPL-MCNC: 8.1 MG/DL (ref 0.5–1.4)
DIFFERENTIAL METHOD BLD: ABNORMAL
EOSINOPHIL # BLD AUTO: 0.4 K/UL (ref 0–0.5)
EOSINOPHIL NFR BLD: 5.6 % (ref 0–8)
ERYTHROCYTE [DISTWIDTH] IN BLOOD BY AUTOMATED COUNT: 17.1 % (ref 11.5–14.5)
EST. GFR  (NO RACE VARIABLE): 4.5 ML/MIN/1.73 M^2
EST. GFR  (NO RACE VARIABLE): 5 ML/MIN/1.73 M^2
GLUCOSE SERPL-MCNC: 106 MG/DL (ref 70–110)
GLUCOSE SERPL-MCNC: 54 MG/DL (ref 70–110)
GLUCOSE SERPL-MCNC: 97 MG/DL (ref 70–110)
HCT VFR BLD AUTO: 42.8 % (ref 37–48.5)
HGB BLD-MCNC: 12.4 G/DL (ref 12–16)
IMM GRANULOCYTES # BLD AUTO: 0.02 K/UL (ref 0–0.04)
IMM GRANULOCYTES NFR BLD AUTO: 0.3 % (ref 0–0.5)
LYMPHOCYTES # BLD AUTO: 1.4 K/UL (ref 1–4.8)
LYMPHOCYTES NFR BLD: 18.8 % (ref 18–48)
MCH RBC QN AUTO: 26.4 PG (ref 27–31)
MCHC RBC AUTO-ENTMCNC: 29 G/DL (ref 32–36)
MCV RBC AUTO: 91 FL (ref 82–98)
MONOCYTES # BLD AUTO: 0.9 K/UL (ref 0.3–1)
MONOCYTES NFR BLD: 12.1 % (ref 4–15)
NEUTROPHILS # BLD AUTO: 4.7 K/UL (ref 1.8–7.7)
NEUTROPHILS NFR BLD: 62.8 % (ref 38–73)
NRBC BLD-RTO: 0 /100 WBC
PHOSPHATE SERPL-MCNC: 4.6 MG/DL (ref 2.7–4.5)
PHOSPHATE SERPL-MCNC: 4.6 MG/DL (ref 2.7–4.5)
PLATELET # BLD AUTO: 156 K/UL (ref 150–450)
PMV BLD AUTO: 10.4 FL (ref 9.2–12.9)
POTASSIUM SERPL-SCNC: 5.2 MMOL/L (ref 3.5–5.1)
POTASSIUM SERPL-SCNC: 5.9 MMOL/L (ref 3.5–5.1)
PROT SERPL-MCNC: 6.9 G/DL (ref 6–8.4)
RBC # BLD AUTO: 4.7 M/UL (ref 4–5.4)
SODIUM SERPL-SCNC: 136 MMOL/L (ref 136–145)
SODIUM SERPL-SCNC: 137 MMOL/L (ref 136–145)
WBC # BLD AUTO: 7.44 K/UL (ref 3.9–12.7)

## 2024-06-30 PROCEDURE — 25000003 PHARM REV CODE 250: Performed by: INTERNAL MEDICINE

## 2024-06-30 PROCEDURE — 85025 COMPLETE CBC W/AUTO DIFF WBC: CPT | Performed by: STUDENT IN AN ORGANIZED HEALTH CARE EDUCATION/TRAINING PROGRAM

## 2024-06-30 PROCEDURE — 99900031 HC PATIENT EDUCATION (STAT)

## 2024-06-30 PROCEDURE — 84100 ASSAY OF PHOSPHORUS: CPT | Performed by: STUDENT IN AN ORGANIZED HEALTH CARE EDUCATION/TRAINING PROGRAM

## 2024-06-30 PROCEDURE — 80053 COMPREHEN METABOLIC PANEL: CPT | Performed by: STUDENT IN AN ORGANIZED HEALTH CARE EDUCATION/TRAINING PROGRAM

## 2024-06-30 PROCEDURE — 20000000 HC ICU ROOM

## 2024-06-30 PROCEDURE — 25000003 PHARM REV CODE 250: Performed by: STUDENT IN AN ORGANIZED HEALTH CARE EDUCATION/TRAINING PROGRAM

## 2024-06-30 PROCEDURE — 99233 SBSQ HOSP IP/OBS HIGH 50: CPT | Mod: ,,, | Performed by: INTERNAL MEDICINE

## 2024-06-30 PROCEDURE — 25000003 PHARM REV CODE 250

## 2024-06-30 PROCEDURE — 94761 N-INVAS EAR/PLS OXIMETRY MLT: CPT

## 2024-06-30 PROCEDURE — 36415 COLL VENOUS BLD VENIPUNCTURE: CPT | Performed by: INTERNAL MEDICINE

## 2024-06-30 PROCEDURE — 21400001 HC TELEMETRY ROOM

## 2024-06-30 PROCEDURE — 25000003 PHARM REV CODE 250: Performed by: NURSE PRACTITIONER

## 2024-06-30 PROCEDURE — 80069 RENAL FUNCTION PANEL: CPT | Performed by: INTERNAL MEDICINE

## 2024-06-30 PROCEDURE — 36415 COLL VENOUS BLD VENIPUNCTURE: CPT | Performed by: STUDENT IN AN ORGANIZED HEALTH CARE EDUCATION/TRAINING PROGRAM

## 2024-06-30 RX ADMIN — METOPROLOL TARTRATE 12.5 MG: 25 TABLET, FILM COATED ORAL at 08:06

## 2024-06-30 RX ADMIN — MUPIROCIN 1 G: 20 OINTMENT TOPICAL at 08:06

## 2024-06-30 RX ADMIN — APIXABAN 2.5 MG: 2.5 TABLET, FILM COATED ORAL at 08:06

## 2024-06-30 RX ADMIN — DRONEDARONE 400 MG: 400 TABLET, FILM COATED ORAL at 05:06

## 2024-06-30 RX ADMIN — ASPIRIN 81 MG: 81 TABLET, COATED ORAL at 08:06

## 2024-06-30 RX ADMIN — ATORVASTATIN CALCIUM 40 MG: 40 TABLET, FILM COATED ORAL at 08:06

## 2024-06-30 RX ADMIN — Medication 16 G: at 06:06

## 2024-06-30 RX ADMIN — DRONEDARONE 400 MG: 400 TABLET, FILM COATED ORAL at 08:06

## 2024-06-30 RX ADMIN — SODIUM POLYSTYRENE SULFONATE 30 G: 15 SUSPENSION ORAL; RECTAL at 09:06

## 2024-06-30 RX ADMIN — SODIUM ZIRCONIUM CYCLOSILICATE 10 G: 10 POWDER, FOR SUSPENSION ORAL at 02:06

## 2024-06-30 RX ADMIN — ISOSORBIDE MONONITRATE 30 MG: 30 TABLET, EXTENDED RELEASE ORAL at 08:06

## 2024-06-30 NOTE — ASSESSMENT & PLAN NOTE
This patient has hyperkalemia which is uncontrolled. We will monitor for arrhythmias with EKG or continuous telemetry. We will treat the hyperkalemia with IV insulin and dextrose and Nebulized albuterol sulfate. The likely etiology of the hyperkalemia is ESRD.  The patients latest potassium has been reviewed and the results are listed below  Recent Labs   Lab 06/30/24  0328   K 5.9*     Dialysis should take care

## 2024-06-30 NOTE — SUBJECTIVE & OBJECTIVE
Interval History:     Seen and examined   BP and HR stable . No CP or abdominal pain   Discussed about code status    Review of Systems  Objective:     Vital Signs (Most Recent):  Temp: 98 °F (36.7 °C) (06/30/24 1101)  Pulse: 88 (06/30/24 1200)  Resp: (!) 28 (06/30/24 1200)  BP: 112/81 (06/30/24 1200)  SpO2: 99 % (06/30/24 1200) Vital Signs (24h Range):  Temp:  [97.9 °F (36.6 °C)-98.7 °F (37.1 °C)] 98 °F (36.7 °C)  Pulse:  [] 88  Resp:  [12-55] 28  SpO2:  [94 %-100 %] 99 %  BP: ()/(44-81) 112/81     Weight: 48.7 kg (107 lb 5.8 oz)  Body mass index is 17.87 kg/m².    Intake/Output Summary (Last 24 hours) at 6/30/2024 1236  Last data filed at 6/30/2024 1159  Gross per 24 hour   Intake 1520 ml   Output 1300 ml   Net 220 ml         Physical Exam  Vitals and nursing note reviewed.   HENT:      Head: Normocephalic and atraumatic.   Eyes:      Conjunctiva/sclera: Conjunctivae normal.   Neck:      Vascular: No JVD.   Cardiovascular:      Rate and Rhythm: Tachycardia present.      Heart sounds: Normal heart sounds.   Pulmonary:      Effort: Pulmonary effort is normal.      Breath sounds: Normal breath sounds.   Abdominal:      Palpations: Abdomen is soft.   Musculoskeletal:         General: Normal range of motion.   Skin:     General: Skin is warm.   Neurological:      Mental Status: She is alert and oriented to person, place, and time.   Psychiatric:         Mood and Affect: Mood normal.             Significant Labs: All pertinent labs within the past 24 hours have been reviewed.  CBC:   Recent Labs   Lab 06/28/24  1701 06/29/24  0525 06/30/24  0328   WBC 5.84 9.18 7.44   HGB 14.9 14.2 12.4   HCT 51.0* 48.9* 42.8    140* 156     CMP:   Recent Labs   Lab 06/28/24  1701 06/30/24  0328    137   K 4.6 5.9*   CL 95 102   CO2 30* 25   * 54*   BUN 38* 38*   CREATININE 7.7* 7.5*   CALCIUM 9.5 8.9   PROT 8.0 6.9   ALBUMIN 4.4 3.7   BILITOT 0.6 0.5   ALKPHOS 74 59   AST 25 16   ALT 17 12   ANIONGAP 13  "10     Cardiac Markers: No results for input(s): "CKMB", "MYOGLOBIN", "BNP", "TROPISTAT" in the last 48 hours.    Significant Imaging: I have reviewed all pertinent imaging results/findings within the past 24 hours.  "

## 2024-06-30 NOTE — NURSING
When Dr. Maynard rounded, patient stated to her that one of her medications cost $600.00 and she is not able to afford it. I called [facility] pharmacy (patient's pharmacy), and pharmacist said the Eliquis 2.5 mg tablet 30 day supply is $142.00 and the Multaq is $191.00 for a 30 day supply. Patient may be adding the total for all of her medications together, but none of the medications cost $600.00. I relayed the issue for patient and the amounts to Dr. Altamirano and Dr. Smith - Cardiology for when she does discharge.  Dr. Maynard also updated.

## 2024-06-30 NOTE — ASSESSMENT & PLAN NOTE
On Eliquis and Multaq outpatient - continue  Had episode of tachycardia during dialysis - EKG ordered.- Atrial fib noted.  Cardiology following.   Added lopressor

## 2024-06-30 NOTE — ASSESSMENT & PLAN NOTE
Creatine stable for now. BMP reviewed- noted Estimated Creatinine Clearance: 4.3 mL/min (A) (based on SCr of 7.5 mg/dL (H)). according to latest data. Based on current GFR, CKD stage is end stage.  Monitor UOP and serial BMP and adjust therapy as needed. Renally dose meds. Avoid nephrotoxic medications and procedures.  Nephrology follow up for HD

## 2024-06-30 NOTE — PROGRESS NOTES
INPATIENT NEPHROLOGY [place]  [facility] NEPHROLOGY    Tyra Isaac  06/30/2024    Reason for consultation:    esrd    Chief Complaint:   Chief Complaint   Patient presents with    Chest Pain     Pt says she has been having pain in the center of her chest for the last two hours.  Pt sad she threw up prior to the pain.  Pt has hx of afib          History of Present Illness:    Per H and P   Patient is a 84-year-old female with history of atrial fibrillation on Eliquis, ESRD on dialysis Monday Wednesday Friday, CVA presents to emergency room with complaints of chest discomfort.  Patient reports chest discomfort started earlier today lasted about 2 hours, describes it as tightness radiating from the right to left side.  Resolved prior to coming to the emergency room.  Patient reports similar symptoms in the past.  Did have episode of emesis.  Potassium 6.5 on admission, ED physician spoke with Nephrology who ordered stat dialysis, ED physician placed hyperkalemia protocol.  Troponin 39.6.  Patient being admitted for chest pain workup.     6/28  Patient seen on hemodialysis for uremic clearance and ultrafiltration.    Cp resolved.  No sob.    Addendum:  --about 30 minutes after I saw her she had a syncopal episode.  Her blood sugar was 90.   Her blood was rinsed back.   She never became pulseless.  She woke up after the blood was returned.  She briefly had hypotension.  Her bp came up to 150s/80s.   She woke up and was cognitive and appropriate.  No chest pain or sob.   She received about an hour and a half of dialysis  6/29  665cc off w/HD yesterday.  VSS, no chemistry results.  LADI for stress test, then will have dialysis--only got about an hour and a half of yesterday's tx.  6/30 nuclear stress test neg- went into SVT during last 30min of HD so treatment terminated with rapid response called- patient given metoprolol IV, diltiazem IV and 250cc NS bolus and moved to ICU- got off 800cc UF yest- HR < 100, BP  stable, on RA- started on oral beta blocker    Plan of Care:     ESRD on HD MWF  Syncope  Chest pain  AF/SVT  Elevated trop- demand ischemia  HTN  Hyperkalemia  SHPT  Anemia of CKD  Hypoglycemia      - continue HD MWF  - syncope- hypoglycemia- management per HM  - chest pain- nuclear stress test neg  - cardiology started a low dose beta blocker- monitor for bradycardia  - trend troponin- patient may not be able to afford eliquis- nurse looking into it  - BP controlled- UF is very challenging due to hemodynamic instability- will have TBD goal on HD days right now- liberalize fluids to 1.5L since there may be a component of volume depletion  - hyperK noted this AM- got kayexalate- repeat renal at 2pm- on renal diet  - recheck phos  - no acute BRAYAN needs  - BG is low- defer management to     Patient target weight 46kg- usually only pulled down to 46.5.  Looks at [employer] flow sheets- barely take off 500-1L per treatment.  Will go ahead and change dry weight to 47kg for now and reassess.    Discharge pending medication acquisition and repeat renal panel to f/u high K. Also need to make sure she doesn't remain hypoglycemic.    Thank you for allowing us to participate in this patient's care. We will continue to follow.    Vital Signs:  Temp Readings from Last 3 Encounters:   06/30/24 98.4 °F (36.9 °C) (Oral)   06/03/24 98.5 °F (36.9 °C) (Oral)   03/19/24 97.4 °F (36.3 °C) (Tympanic)       Pulse Readings from Last 3 Encounters:   06/30/24 97   06/06/24 71   06/03/24 69       BP Readings from Last 3 Encounters:   06/30/24 134/63   06/06/24 (!) 122/56   06/03/24 130/60       Weight:  Wt Readings from Last 3 Encounters:   06/28/24 48.7 kg (107 lb 5.8 oz)   06/28/24 48.7 kg (107 lb 5.8 oz)   06/06/24 46.7 kg (103 lb)     Medications:  No current facility-administered medications on file prior to encounter.     Current Outpatient Medications on File Prior to Encounter   Medication Sig Dispense Refill    atorvastatin (LIPITOR) 40  MG tablet Take 40 mg by mouth once daily.      b complex vitamins tablet Take 1 tablet by mouth once daily.      calcium acetate,phosphat bind, (PHOSLO) 667 mg capsule Take 667 mg by mouth 3 (three) times daily with meals.      dorzolamide-timolol 2-0.5% (COSOPT) 22.3-6.8 mg/mL ophthalmic solution Place 1 drop into both eyes 2 (two) times daily.      dronedarone (MULTAQ) 400 mg Tab Take 1 tablet (400 mg total) by mouth 2 (two) times daily with meals. 180 tablet 0    ELIQUIS 2.5 mg Tab Take 2.5 mg by mouth 2 (two) times daily.      latanoprost 0.005 % ophthalmic solution Place 1 drop into both eyes every evening.      loperamide (IMODIUM A-D) 2 mg Tab Take 1 tablet (2 mg total) by mouth 4 (four) times daily as needed (diarrhea). 3 tablet 0    midodrine (PROAMATINE) 10 MG tablet Take 1 tablet (10 mg total) by mouth 3 (three) times daily with meals. 270 tablet 0    ondansetron (ZOFRAN-ODT) 4 MG TbDL Take 1 tablet (4 mg total) by mouth every 6 (six) hours as needed (nausea). 30 tablet 5     Scheduled Meds:   0.9% NaCl   Intravenous Once    0.9% NaCl   Intravenous Once    apixaban  2.5 mg Oral BID    aspirin  81 mg Oral Daily    atorvastatin  40 mg Oral QHS    dronedarone  400 mg Oral BID WM    isosorbide mononitrate  30 mg Oral Daily    metoprolol tartrate  12.5 mg Oral BID    mupirocin   Nasal BID     Continuous Infusions:  PRN Meds:.  Current Facility-Administered Medications:     0.9% NaCl, , Intravenous, PRN    0.9% NaCl, , Intravenous, PRN    acetaminophen, 650 mg, Oral, Q8H PRN    dextrose 50%, 12.5 g, Intravenous, PRN    dextrose 50%, 25 g, Intravenous, PRN    glucagon (human recombinant), 1 mg, Intramuscular, PRN    glucose, 16 g, Oral, PRN    glucose, 24 g, Oral, PRN    heparin (porcine), 5,000 Units, Intravenous, PRN    heparin (porcine), 5,000 Units, Intravenous, PRN    hydrALAZINE, 10 mg, Intravenous, Q8H PRN    magnesium oxide, 800 mg, Oral, PRN    magnesium oxide, 800 mg, Oral, PRN    melatonin, 6 mg,  "Oral, Nightly PRN    ondansetron, 8 mg, Intravenous, Q8H PRN    potassium bicarbonate, 35 mEq, Oral, PRN    potassium bicarbonate, 50 mEq, Oral, PRN    potassium bicarbonate, 60 mEq, Oral, PRN    potassium, sodium phosphates, 2 packet, Oral, PRN    potassium, sodium phosphates, 2 packet, Oral, PRN    potassium, sodium phosphates, 2 packet, Oral, PRN    promethazine (PHENERGAN) 12.5 mg in 0.9% NaCl 50 mL IVPB, 12.5 mg, Intravenous, Q6H PRN    sodium chloride 0.9%, 250 mL, Intravenous, PRN    sodium chloride 0.9%, 250 mL, Intravenous, PRN    sodium chloride 0.9%, 10 mL, Intravenous, PRN    Review of Systems:  Neg    Physical Exam:    /63   Pulse 97   Temp 98.4 °F (36.9 °C) (Oral)   Resp 20   Ht 5' 5" (1.651 m)   Wt 48.7 kg (107 lb 5.8 oz)   SpO2 99%   BMI 17.87 kg/m²     General Appearance:    Alert, cooperative, no distress, appears stated age   Head:    Normocephalic, without obvious abnormality, atraumatic   Eyes:    PER, conjunctiva/corneas clear, EOM's intact in both eyes        Throat:   Lips, mucosa, and tongue normal; teeth and gums normal   Back:     Symmetric, no curvature, ROM normal, no CVA tenderness   Lungs:     Clear to auscultation bilaterally, respirations unlabored   Chest wall:    No tenderness or deformity   Heart:    Regular rate and rhythm, S1 and S2 normal, no murmur, rub   or gallop   Abdomen:     Soft, non-tender, bowel sounds active all four quadrants,     no masses, no organomegaly   Extremities:   Extremities normal, atraumatic, no cyanosis or edema   Pulses:   2+ and symmetric all extremities   MSK:   No joint or muscle swelling, tenderness or deformity   Skin:   Skin color, texture, turgor normal, no rashes or lesions   Neurologic:   CNII-XII intact, normal strength and sensation       Throughout.  No flap     Results:  Lab Results   Component Value Date     06/30/2024    K 5.9 (H) 06/30/2024     06/30/2024    CO2 25 06/30/2024    BUN 38 (H) 06/30/2024    " CREATININE 7.5 (H) 06/30/2024    CALCIUM 8.9 06/30/2024    ANIONGAP 10 06/30/2024    ESTGFRAFRICA 4.7 (A) 02/08/2022    EGFRNONAA 4.0 (A) 02/08/2022       Lab Results   Component Value Date    CALCIUM 8.9 06/30/2024    PHOS 3.1 03/16/2024       Recent Labs   Lab 06/30/24  0328   WBC 7.44   RBC 4.70   HGB 12.4   HCT 42.8      MCV 91   MCH 26.4*   MCHC 29.0*        Imaging Results              X-Ray Chest AP Portable (Final result)  Result time 06/28/24 01:10:09      Final result by Aldo Espinosa MD (06/28/24 01:10:09)                   Impression:      Radiographic findings as above.      Electronically signed by: Aldo Espinosa  Date:    06/28/2024  Time:    01:10               Narrative:    EXAMINATION:  XR CHEST AP PORTABLE    CLINICAL HISTORY:  Chest Pain;    TECHNIQUE:  Single frontal view of the chest was performed.    COMPARISON:  Prior examinations, most recent of which is March 19, 2024    FINDINGS:  Single portable chest view is submitted.  When accounting for position and technique and depth of inspiration the appearance of the cardiomediastinal silhouette is stable.  Aortic atherosclerotic change and mild tortuosity is noted.    Mild accentuation of interstitial markings may relate to a pattern of mild interstitial infiltrate/edema.  There is no focal consolidation, there is no pleural effusion or pneumothorax.  Skin folds overlie the patient.    The osseous structures demonstrate chronic change.                        Wet Read by Jacquie Wang MD (06/28/24 00:32:39, [facility] - Emergency Dept, Emergency Medicine)    No pulmonary infiltrates no pulmonary edema no cardiomegaly no pleural effusion                                    Patient care was time spent personally by me on the following activities: > 35 min  Obtaining a history  Examination of patient.  Providing medical care at the patients bedside.  Developing a treatment plan with patient or surrogate and  bedside caregivers  Ordering and reviewing laboratory studies, radiographic studies, pulse oximetry.  Ordering and performing treatments and interventions.  Evaluation of patient's response to treatment.  Discussions with consultants while on the unit and immediately available to the patient.  Re-evaluation of the patient's condition.  Documentation in the medical record.       I have personally reviewed pertinent radiological imaging and reports.    Rose Maynard MD    [facility] [place]  [phone]

## 2024-06-30 NOTE — ASSESSMENT & PLAN NOTE
Troponin 39.6 --> 61.9  Follow up echocardiogram and good EF and non acute   Stress test  negative for reversible ischemia  aspirin daily  Lopressor  Follow cardiology   Follows with Dr. Thompson outpatient

## 2024-06-30 NOTE — RESPIRATORY THERAPY
06/29/24 1947   Patient Assessment/Suction   Level of Consciousness (AVPU) alert   Respiratory Effort Unlabored   Expansion/Accessory Muscles/Retractions no use of accessory muscles;no retractions   All Lung Fields Breath Sounds Anterior:;Lateral:;clear;equal bilaterally   Rhythm/Pattern, Respiratory unlabored;no shortness of breath reported;pattern regular;depth regular   Cough Frequency infrequent   Cough Type dry;no productive sputum   PRE-TX-O2   Device (Oxygen Therapy) room air   SpO2 99 %   Pulse Oximetry Type Continuous   $ Pulse Oximetry - Multiple Charge Pulse Oximetry - Multiple   Pulse 79   Resp (!) 29   Positioning HOB elevated 30 degrees   Education   $ Education 15 min;Other (see comment)

## 2024-06-30 NOTE — PROGRESS NOTES
[facility]  Department of Cardiology  Progress Note      PATIENT NAME: Tyra Isaac  MRN: [id number]  TODAY'S DATE: 06/30/2024  ADMIT DATE: 6/27/2024                          CONSULT REQUESTED BY: Fabián Altamirano MD    SUBJECTIVE     PRINCIPAL PROBLEM: Chest pain    06/30/2024  Pt seen this morning resting comfortably in ICU. Remains in NSR with rate in 80s. She states she is feeling well today however is concerned about costs of medications.     6/29/2024  Patient seen today in stress lab and then later in the dialysis.  Patient had been tolerating dialysis well but then developed an elevated heart rate in the 160s which quickly settled down to the 110s after dialysis was stopped.  Patient reports having chest pain only whenever she has dialysis and does not have any with exertion.  Her nuclear stress test is negative for reversible ischemia.    REASON FOR CONSULT:  Chest pain, elevated troponin      HPI:    Patient is a 84-year-old female with history of atrial fibrillation on Eliquis, ESRD on dialysis Monday Wednesday Friday, CVA presents to emergency room with complaints of chest tightness x2 hours prior to arrival.  Chest pain resolved in ED but reports associated shortness of breath, nausea, vomiting, and dizziness when CP was occuring.  Hyperkalemia in ED with K of 6.5.  Nephrology consulted, stat dialysis performed.     Trivially elevated troponin HS.  39 > 61.  Chronically elevated troponin HS likely due to ESRD on HD. No acute ST-T wave changes.      During examination, patient is resting in bed.  Dialysis performed. Hypertensive this afternoon. SR on tele.  C/o HA.  CP improved.      ECHO 1/5/24    Left Ventricle: The left ventricle is normal in size. Normal wall thickness. Normal wall motion. There is normal systolic function with a visually estimated ejection fraction of 60 - 65%. There is diastolic dysfunction.    Right Ventricle: Normal right ventricular cavity size. Wall thickness  is normal. Right ventricle wall motion  is normal. Systolic function is normal.    Left Atrium: Left atrium is severely dilated.    Mitral Valve: There is moderate bileaflet sclerosis. There is moderate mitral annular calcification present. There is mild to moderate regurgitation with a centrally directed jet.    Tricuspid Valve: There is mild to moderate regurgitation.    Pulmonary Artery: There is mild pulmonary hypertension. The estimated pulmonary artery systolic pressure is 41 mmHg.    IVC/SVC: Normal venous pressure at 3 mmHg.    CATH 2/7/22  The left ventricular systolic function was normal.  The left ventricular end diastolic pressure was elevated.  The pre-procedure left ventricular end diastolic pressure was 20.  The post-procedure left ventricular end diastolic pressure was 31.  The ejection fraction was calculated to be 60%.  The estimated blood loss was none.  Left main is patent, lad patent moderate to severe tortuosity.  Left circumflex artery proximal stent is patent moderate to severe tortuosity, ramus intermedius is patent.  RCA is small caliber is patent moderate tortuosity. Non dominant vessel.        From H&P   Chest Pain       Pt says she has been having pain in the center of her chest for the last two hours.  Pt sad she threw up prior to the pain.  Pt has hx of afib         HPI: Patient is a 84-year-old female with history of atrial fibrillation on Eliquis, ESRD on dialysis Monday Wednesday Friday, CVA presents to emergency room with complaints of chest discomfort.  Patient reports chest discomfort started earlier today lasted about 2 hours, describes it as tightness radiating from the right to left side.  Resolved prior to coming to the emergency room.  Patient reports similar symptoms in the past.  Did have episode of emesis.  Potassium 6.5 on admission, ED physician spoke with Nephrology who ordered stat dialysis, ED physician placed hyperkalemia protocol.  Troponin 39.6.  Patient being  admitted for chest pain workup.      Review of patient's allergies indicates:   Allergen Reactions    Cyclobenzaprine     Fish containing products Hives    Peanut Other (See Comments)    Tramadol Itching       Past Medical History:   Diagnosis Date    A-fib     Anxiety     Depression     Disorder of kidney and ureter     Encephalopathy acute 1/1/2018    End stage kidney disease 6/17/2017    Gout     Hyperlipidemia     Hypertension     Moderate episode of recurrent major depressive disorder 1/17/2018    Nephropathy hypertensive, 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    Osteopenia of multiple sites 3/9/2018    Based upon bone density measurements. Patient also has chronic kidney disease.    Stroke 11/2016     Past Surgical History:   Procedure Laterality Date    ANGIOGRAM, CORONARY, WITH LEFT HEART CATHETERIZATION N/A 2/7/2022    Procedure: Angiogram, Coronary, with Left Heart Cath;  Surgeon: Gino Leal MD;  Location: [facility] CATH/EP LAB;  Service: Cardiology;  Laterality: N/A;    CARDIAC SURGERY      stents    EYE SURGERY      WRIST SURGERY       Social History     Tobacco Use    Smoking status: Never    Smokeless tobacco: Never   Substance Use Topics    Alcohol use: No    Drug use: No        REVIEW OF SYSTEMS    As mentioned in HPI    OBJECTIVE     VITAL SIGNS (Most Recent)  Temp: 98 °F (36.7 °C) (06/30/24 1101)  Pulse: 88 (06/30/24 1200)  Resp: (!) 28 (06/30/24 1200)  BP: 112/81 (06/30/24 1200)  SpO2: 99 % (06/30/24 1200)    VENTILATION STATUS  Resp: (!) 28 (06/30/24 1200)  SpO2: 99 % (06/30/24 1200)           I & O (Last 24H):  Intake/Output Summary (Last 24 hours) at 6/30/2024 1400  Last data filed at 6/30/2024 1159  Gross per 24 hour   Intake 1020 ml   Output 0 ml   Net 1020 ml       WEIGHTS  Wt Readings from Last 3 Encounters:   06/28/24 0300 48.7 kg (107 lb 5.8 oz)   06/27/24  2343 47.6 kg (105 lb)   06/28/24 1440 48.7 kg (107 lb 5.8 oz)   06/06/24 0809 46.7 kg (103 lb)       PHYSICAL EXAM    CONSTITUTIONAL: NAD, frail chronically ill appearing elderly female  HEENT: Normocephalic. No pallor  NECK: no JVD  LUNGS: CTA b/l  HEART: regular rate and rhythm, S1, S2 normal, no murmur   ABDOMEN: sof  EXTREMITIES: No edema  SKIN: No rash  NEURO: AAO X 3  PSYCH: normal affect      HOME MEDICATIONS:  No current facility-administered medications on file prior to encounter.     Current Outpatient Medications on File Prior to Encounter   Medication Sig Dispense Refill    atorvastatin (LIPITOR) 40 MG tablet Take 40 mg by mouth once daily.      b complex vitamins tablet Take 1 tablet by mouth once daily.      calcium acetate,phosphat bind, (PHOSLO) 667 mg capsule Take 667 mg by mouth 3 (three) times daily with meals.      dorzolamide-timolol 2-0.5% (COSOPT) 22.3-6.8 mg/mL ophthalmic solution Place 1 drop into both eyes 2 (two) times daily.      dronedarone (MULTAQ) 400 mg Tab Take 1 tablet (400 mg total) by mouth 2 (two) times daily with meals. 180 tablet 0    ELIQUIS 2.5 mg Tab Take 2.5 mg by mouth 2 (two) times daily.      latanoprost 0.005 % ophthalmic solution Place 1 drop into both eyes every evening.      loperamide (IMODIUM A-D) 2 mg Tab Take 1 tablet (2 mg total) by mouth 4 (four) times daily as needed (diarrhea). 3 tablet 0    midodrine (PROAMATINE) 10 MG tablet Take 1 tablet (10 mg total) by mouth 3 (three) times daily with meals. 270 tablet 0    ondansetron (ZOFRAN-ODT) 4 MG TbDL Take 1 tablet (4 mg total) by mouth every 6 (six) hours as needed (nausea). 30 tablet 5       SCHEDULED MEDS:   apixaban  2.5 mg Oral BID    aspirin  81 mg Oral Daily    atorvastatin  40 mg Oral QHS    dronedarone  400 mg Oral BID WM    isosorbide mononitrate  30 mg Oral Daily    metoprolol tartrate  12.5 mg Oral BID    mupirocin   Nasal BID    sodium zirconium cyclosilicate  10 g Oral Once       CONTINUOUS  "INFUSIONS:    PRN MEDS:  Current Facility-Administered Medications:     0.9% NaCl, , Intravenous, PRN    0.9% NaCl, , Intravenous, PRN    acetaminophen, 650 mg, Oral, Q8H PRN    dextrose 50%, 12.5 g, Intravenous, PRN    dextrose 50%, 25 g, Intravenous, PRN    glucagon (human recombinant), 1 mg, Intramuscular, PRN    glucose, 16 g, Oral, PRN    glucose, 24 g, Oral, PRN    heparin (porcine), 5,000 Units, Intravenous, PRN    heparin (porcine), 5,000 Units, Intravenous, PRN    hydrALAZINE, 10 mg, Intravenous, Q8H PRN    magnesium oxide, 800 mg, Oral, PRN    magnesium oxide, 800 mg, Oral, PRN    melatonin, 6 mg, Oral, Nightly PRN    ondansetron, 8 mg, Intravenous, Q8H PRN    potassium bicarbonate, 35 mEq, Oral, PRN    potassium bicarbonate, 50 mEq, Oral, PRN    potassium bicarbonate, 60 mEq, Oral, PRN    potassium, sodium phosphates, 2 packet, Oral, PRN    potassium, sodium phosphates, 2 packet, Oral, PRN    potassium, sodium phosphates, 2 packet, Oral, PRN    promethazine (PHENERGAN) 12.5 mg in 0.9% NaCl 50 mL IVPB, 12.5 mg, Intravenous, Q6H PRN    sodium chloride 0.9%, 250 mL, Intravenous, PRN    sodium chloride 0.9%, 250 mL, Intravenous, PRN    sodium chloride 0.9%, 10 mL, Intravenous, PRN    LABS AND DIAGNOSTICS     CBC LAST 3 DAYS  Recent Labs   Lab 06/28/24  0553 06/28/24  1701 06/29/24  0525 06/30/24  0328   WBC 5.87 5.84 9.18 7.44   RBC 4.62 5.71* 5.43* 4.70   HGB 12.0 14.9 14.2 12.4   HCT 41.0 51.0* 48.9* 42.8   MCV 89 89 90 91   MCH 26.0* 26.1* 26.2* 26.4*   MCHC 29.3* 29.2* 29.0* 29.0*   RDW 17.1* 17.4* 18.0* 17.1*    170 140* 156   MPV 10.1 9.9 9.9 10.4   GRAN 44.8  2.6  --  74.4*  6.8 62.8  4.7   LYMPH 29.8  1.8  --  11.4*  1.1 18.8  1.4   MONO 13.6  0.8  --  13.0  1.2* 12.1  0.9   BASO 0.02  --  0.03 0.03   NRBC 0  --  0 0       COAGULATION LAST 3 DAYS  No results for input(s): "LABPT", "INR", "APTT" in the last 168 hours.    CHEMISTRY LAST 3 DAYS  Recent Labs   Lab 06/28/24  0018 " "06/28/24  0552 06/28/24  1701 06/30/24  0328 06/30/24  1321    142 138 137 136   K 6.5* 5.4* 4.6 5.9* 5.2*   CL 98 99 95 102 100   CO2 30* 30* 30* 25 24   ANIONGAP 12 13 13 10 12   BUN 56* 58* 38* 38* 42*   CREATININE 9.2* 9.7* 7.7* 7.5* 8.1*   GLU 90 54* 120* 54* 106   CALCIUM 9.6 9.5 9.5 8.9 8.9   MG 2.3  --   --   --   --    ALBUMIN 4.3 3.8 4.4 3.7 3.6   PROT 7.6 6.9 8.0 6.9  --    ALKPHOS 64 58 74 59  --    ALT 16 13 17 12  --    AST 21 17 25 16  --    BILITOT 0.4 0.4 0.6 0.5  --        CARDIAC PROFILE LAST 3 DAYS  Recent Labs   Lab 06/28/24  0018 06/28/24  0553 06/28/24  1349 06/28/24  1701   *  --   --   --    TROPONINIHS 39.6* 61.9* 52.2* 56.7*       ENDOCRINE LAST 3 DAYS  Recent Labs   Lab 06/28/24  0553   TSH 1.151       LAST ARTERIAL BLOOD GAS  ABG  No results for input(s): "PH", "PO2", "PCO2", "HCO3", "BE" in the last 168 hours.    LAST 7 DAYS MICROBIOLOGY   Microbiology Results (last 7 days)       ** No results found for the last 168 hours. **            MOST RECENT IMAGING  Nuclear Stress Test    The ECG portion of the study is negative for ischemia.    The patient reported no chest pain during the stress test.    There were no arrhythmias during stress.    The nuclear portion of this study will be reported separately.  NM Myocardial Perfusion Spect Multi Pharmacologic  Narrative: EXAMINATION:  NM MYOCARDIAL PERFUSION SPECT MULTI PHARM    CLINICAL HISTORY:  CAD screening, high CAD risk, not treadmill candidate;    TECHNIQUE:  11.5 mCi technetium 99m tetrofosmin was administered IV for the rest portion of the exam, with 27 mCi for the stress portion of the exam, following a 1 day protocol. The patient was stressed with regadenoson 0.4 mg IV.    FINDINGS:  Comparison the prior exam of 03/21/2023. There is normal and homogeneous radiotracer uptake by the left ventricular myocardium, with no photopenic defects to suggest pharmacologically induced reversible ischemia or infarct. There are no " wall motion abnormalities on the gated cine images, with no abnormal transient left ventricular dilatation on stress images.    Ejection fraction is calculated at 90 %. The polar map images confirm the planar SPECT findings.  Impression: 1.   No evidence of pharmacologically induced reversible ischemia or infarct.    2.   Normal left ventricular wall motion.    3.   Calculated ejection fraction of 90 %.    Electronically signed by: Aleksey Atkins  Date:    06/29/2024  Time:    11:01      ECHOCARDIOGRAM RESULTS (last 5)  Results for orders placed during the hospital encounter of 01/04/24    Echo    Interpretation Summary    Left Ventricle: The left ventricle is normal in size. Normal wall thickness. Normal wall motion. There is normal systolic function with a visually estimated ejection fraction of 60 - 65%. There is diastolic dysfunction.    Right Ventricle: Normal right ventricular cavity size. Wall thickness is normal. Right ventricle wall motion  is normal. Systolic function is normal.    Left Atrium: Left atrium is severely dilated.    Mitral Valve: There is moderate bileaflet sclerosis. There is moderate mitral annular calcification present. There is mild to moderate regurgitation with a centrally directed jet.    Tricuspid Valve: There is mild to moderate regurgitation.    Pulmonary Artery: There is mild pulmonary hypertension. The estimated pulmonary artery systolic pressure is 41 mmHg.    IVC/SVC: Normal venous pressure at 3 mmHg.      Results for orders placed during the hospital encounter of 03/20/23    Echo    Interpretation Summary  · The left ventricle is normal in size with moderate concentric hypertrophy and normal systolic function.  · The estimated ejection fraction is 57%.  · Normal left ventricular diastolic function.  · Normal right ventricular size with normal right ventricular systolic function.  · Moderate to severe tricuspid regurgitation.  · Mild-to-moderate mitral regurgitation.  · Moderate  left atrial enlargement.  · Mild right atrial enlargement.  · There is mild pulmonary hypertension.      Results for orders placed during the hospital encounter of 03/06/23    Echo    Interpretation Summary  · The left ventricle is normal in size with normal systolic function.  · The estimated ejection fraction is 60%.  · Grade II left ventricular diastolic dysfunction.  · Normal right ventricular size with normal right ventricular systolic function.  · Moderate left atrial enlargement.  · Mild-to-moderate mitral regurgitation.  · Moderate to severe tricuspid regurgitation.  · There is mild pulmonary hypertension.  · There is mild aortic valve stenosis.  · Aortic valve area is 1.78 cm2; peak velocity is 2.01 m/s; mean gradient is 9 mmHg.      Results for orders placed during the hospital encounter of 01/26/22    Echo    Interpretation Summary  · The left ventricle is normal in size with mild concentric hypertrophy and normal systolic function.  · The estimated ejection fraction is 65%.  · Grade II left ventricular diastolic dysfunction.  · Atrial fibrillation not observed.  · Normal right ventricular size with normal right ventricular systolic function.  · Moderate to severe left atrial enlargement.  · Mild right atrial enlargement.  · Mild mitral regurgitation.  · Mild to moderate tricuspid regurgitation.  · Normal central venous pressure (3 mmHg).  · The estimated PA systolic pressure is 36 mmHg.  · Mildly elevated gradient accross mitral valve of around 6 mmHg at HR of 64 bpm. MVA by pressure half time is normal, however this can be inaccurate.      Results for orders placed during the hospital encounter of 03/03/21    Echo Color Flow Doppler? Yes    Interpretation Summary  · The left ventricle is small with concentric remodeling and normal systolic function. The estimated ejection fraction is 63%  · The estimated PA systolic pressure is 38 mmHg.  · Grade II left ventricular diastolic dysfunction.  · Normal right  ventricular size with normal right ventricular systolic function.  · Mild-to-moderate mitral regurgitation.  · Mild to moderate tricuspid regurgitation.  · Normal central venous pressure (3 mmHg).  · The patient converted from atrial fibrillation to normal sinus rhythm 03/03/2021      CURRENT/PREVIOUS VISIT EKG  Results for orders placed or performed during the hospital encounter of 06/27/24   EKG 12-lead    Collection Time: 06/29/24  2:38 PM   Result Value Ref Range    QRS Duration 80 ms    [facility] QTC Calculation 442 ms    Narrative    Test Reason : R00.0,    Vent. Rate : 177 BPM     Atrial Rate : 177 BPM     P-R Int : 120 ms          QRS Dur : 080 ms      QT Int : 258 ms       P-R-T Axes : 057 -42 231 degrees     QTc Int : 442 ms    Sinus tachycardia  Left axis deviation  Marked ST abnormality, possible inferior subendocardial injury  Marked ST abnormality, possible anterolateral subendocardial injury  Abnormal ECG  When compared with ECG of 29-JUN-2024 13:34,  Significant changes have occurred    Referred By: [email or web address]   SELF           Confirmed By:            ASSESSMENT/PLAN:     Active Hospital Problems    Diagnosis    *Chest pain    Hyperkalemia    ESRD (end stage renal disease)    Atrial fibrillation    Syncope and collapse       ASSESSMENT & PLAN:     Chest Pain  Hyperkalemia  Elevated troponin HS- mild  ESRD on HD  PAF  SVT    RECOMMENDATIONS:    Continue Multaq 400 mg p.o. b.i.d. and Imdur 30 mg p.o. daily.  Continue Lopressor 12.5 mg BID - pt appears to be tolerating well at this time.   Continue Eliquis 2.5 mg p.o. b.i.d. for anticoagulation.  Will place an order for pharmacy assistance program as an outpatient to help with medication costs.   Pt can dc home from a cardiac standpoint and needs to follow up with her cardiologist Dr. Thompson in 1-2 weeks.       Lizette Villegas NP  Department of Cardiology  Date of Service: 06/30/2024        I have personally interviewed and examined the patient, I have  reviewed the Nurse Practitioner's history and physical, assessment, and plan. I have personally evaluated the patient at bedside and agree with the findings and made appropriate changes as necessary in recommendations.  No events on tele. Cont current medical therapy.  NST negative for ischemia     Heather Smith MD  Department of Cardiology  [facility]  6/30/24

## 2024-06-30 NOTE — PLAN OF CARE
Problem: Adult Inpatient Plan of Care  Goal: Plan of Care Review  Outcome: Met  Goal: Patient-Specific Goal (Individualized)  Outcome: Progressing  Goal: Absence of Hospital-Acquired Illness or Injury  Outcome: Progressing  Goal: Optimal Comfort and Wellbeing  Outcome: Progressing  Goal: Readiness for Transition of Care  Outcome: Progressing

## 2024-06-30 NOTE — PLAN OF CARE
Problem: Adult Inpatient Plan of Care  Goal: Patient-Specific Goal (Individualized)  Outcome: Progressing  Goal: Absence of Hospital-Acquired Illness or Injury  Outcome: Progressing  Goal: Optimal Comfort and Wellbeing  Outcome: Progressing  Goal: Readiness for Transition of Care  Outcome: Progressing     Problem: Skin Injury Risk Increased  Goal: Skin Health and Integrity  Outcome: Progressing     Problem: Fall Injury Risk  Goal: Absence of Fall and Fall-Related Injury  Outcome: Progressing     Problem: Dysrhythmia  Goal: Normalized Cardiac Rhythm  Outcome: Progressing

## 2024-06-30 NOTE — NURSING
Nurses Note -- 4 Eyes      6/30/2024   7:15 AM      Skin assessed during: Daily Assessment      [x] No Altered Skin Integrity Present    [x]Prevention Measures Documented  Right upper arm fistula    [] Yes- Altered Skin Integrity Present or Discovered   [] LDA Added if Not in Epic (Describe Wound)   [] New Altered Skin Integrity was Present on Admit and Documented in LDA   [] Wound Image Taken    Wound Care Consulted? No    Attending Nurse:  Kelle Henao RN/Staff Member:  AMY Soriano

## 2024-06-30 NOTE — NURSING
Nurses Note -- 4 Eyes      6/29/2024   7:30 PM      Skin assessed during: Q Shift Change      [x] No Altered Skin Integrity Present    []Prevention Measures Documented      [] Yes- Altered Skin Integrity Present or Discovered   [] LDA Added if Not in Epic (Describe Wound)   [] New Altered Skin Integrity was Present on Admit and Documented in LDA   [] Wound Image Taken    Wound Care Consulted? No    Attending Nurse:  Aliyah Henao RN/Staff Member:  AMY Souza

## 2024-06-30 NOTE — PROGRESS NOTES
[facility] Medicine  Progress Note    Patient Name: Tyra Isaac  MRN: [id number]  Patient Class: IP- Inpatient   Admission Date: 6/27/2024  Length of Stay: 2 days  Attending Physician: Fabián Altamirano MD  Primary Care Provider: Kalpesh Goel MD        Subjective:     Principal Problem:Chest pain        HPI:  Patient is a 84-year-old female with history of atrial fibrillation on Eliquis, ESRD on dialysis Monday Wednesday Friday, CVA presents to emergency room with complaints of chest discomfort.  Patient reports chest discomfort started earlier today lasted about 2 hours, describes it as tightness radiating from the right to left side.  Resolved prior to coming to the emergency room.  Patient reports similar symptoms in the past.  Did have episode of emesis.  Potassium 6.5 on admission, ED physician spoke with Nephrology who ordered stat dialysis, ED physician placed hyperkalemia protocol.  Troponin 39.6.  Patient being admitted for chest pain workup.    Overview/Hospital Course:  Patient was admitted with chest pain and hyperkalemia.  Her EKG was nonischemic and her troponins were noted to be elevated, which is baseline for her 2/2 ESRD. She remained chest pain free during her stay.  Nephrology was consulted for routine dialysis.  She was maintained on telemetry and cardiology was consulted.  She received medications to shift her potassium and dialysis was ordered.  Her dialysis was cut short due to syncopal episode with hypotension.  She was monitored overnight.  She went for stress testing, which was negative for reversible ischemia.  Dialysis was performed the next day and had to be stopped secondary to SVT.   During hospital stay patient got syncopal episode during dialysis and it was stopped.  Cardiology reviewed and stress test ordered and came back negative.  She had another episode of severe tachycardia after dialysis and needed Lopressor and Cardizem IV and moved to ICU.  Later  beta-blocker added(initially patient was on beta-blocker for unsure reason possible hypotension) rate and blood pressure better controlled and downgraded.  Code status discussed in detail with the patient and she agreed for consultation to palliative care.       Interval History:     Seen and examined   BP and HR stable . No CP or abdominal pain   Discussed about code status    Review of Systems  Objective:     Vital Signs (Most Recent):  Temp: 98 °F (36.7 °C) (06/30/24 1101)  Pulse: 88 (06/30/24 1200)  Resp: (!) 28 (06/30/24 1200)  BP: 112/81 (06/30/24 1200)  SpO2: 99 % (06/30/24 1200) Vital Signs (24h Range):  Temp:  [97.9 °F (36.6 °C)-98.7 °F (37.1 °C)] 98 °F (36.7 °C)  Pulse:  [] 88  Resp:  [12-55] 28  SpO2:  [94 %-100 %] 99 %  BP: ()/(44-81) 112/81     Weight: 48.7 kg (107 lb 5.8 oz)  Body mass index is 17.87 kg/m².    Intake/Output Summary (Last 24 hours) at 6/30/2024 1236  Last data filed at 6/30/2024 1159  Gross per 24 hour   Intake 1520 ml   Output 1300 ml   Net 220 ml         Physical Exam  Vitals and nursing note reviewed.   HENT:      Head: Normocephalic and atraumatic.   Eyes:      Conjunctiva/sclera: Conjunctivae normal.   Neck:      Vascular: No JVD.   Cardiovascular:      Rate and Rhythm: Tachycardia present.      Heart sounds: Normal heart sounds.   Pulmonary:      Effort: Pulmonary effort is normal.      Breath sounds: Normal breath sounds.   Abdominal:      Palpations: Abdomen is soft.   Musculoskeletal:         General: Normal range of motion.   Skin:     General: Skin is warm.   Neurological:      Mental Status: She is alert and oriented to person, place, and time.   Psychiatric:         Mood and Affect: Mood normal.             Significant Labs: All pertinent labs within the past 24 hours have been reviewed.  CBC:   Recent Labs   Lab 06/28/24  1701 06/29/24  0525 06/30/24  0328   WBC 5.84 9.18 7.44   HGB 14.9 14.2 12.4   HCT 51.0* 48.9* 42.8    140* 156     CMP:   Recent Labs  "  Lab 06/28/24  1701 06/30/24  0328    137   K 4.6 5.9*   CL 95 102   CO2 30* 25   * 54*   BUN 38* 38*   CREATININE 7.7* 7.5*   CALCIUM 9.5 8.9   PROT 8.0 6.9   ALBUMIN 4.4 3.7   BILITOT 0.6 0.5   ALKPHOS 74 59   AST 25 16   ALT 17 12   ANIONGAP 13 10     Cardiac Markers: No results for input(s): "CKMB", "MYOGLOBIN", "BNP", "TROPISTAT" in the last 48 hours.    Significant Imaging: I have reviewed all pertinent imaging results/findings within the past 24 hours.    Assessment/Plan:      * Chest pain  Troponin 39.6 --> 61.9  Follow up echocardiogram and good EF and non acute   Stress test  negative for reversible ischemia  aspirin daily  Lopressor  Follow cardiology   Follows with Dr. Thompson outpatient           Hyperkalemia  This patient has hyperkalemia which is uncontrolled. We will monitor for arrhythmias with EKG or continuous telemetry. We will treat the hyperkalemia with IV insulin and dextrose and Nebulized albuterol sulfate. The likely etiology of the hyperkalemia is ESRD.  The patients latest potassium has been reviewed and the results are listed below  Recent Labs   Lab 06/30/24  0328   K 5.9*     Dialysis should take care          ESRD (end stage renal disease)  Creatine stable for now. BMP reviewed- noted Estimated Creatinine Clearance: 4.3 mL/min (A) (based on SCr of 7.5 mg/dL (H)). according to latest data. Based on current GFR, CKD stage is end stage.  Monitor UOP and serial BMP and adjust therapy as needed. Renally dose meds. Avoid nephrotoxic medications and procedures.  Nephrology follow up for HD     Atrial fibrillation  On Eliquis and Multaq outpatient - continue  Had episode of tachycardia during dialysis - EKG ordered.- Atrial fib noted.  Cardiology following.   Added lopressor     Syncope and collapse  Secondary to dialysis and hypotension  rapid response called the other day and yesterday   Downgrade from ICU  Continue to monitor vital signs closely.  Patient is considering for " DNR   Patient agree for palliative care consult        VTE Risk Mitigation (From admission, onward)           Ordered     heparin (porcine) injection 5,000 Units  As needed (PRN)         06/29/24 1908     heparin (porcine) injection 5,000 Units  As needed (PRN)         06/29/24 1908     apixaban tablet 2.5 mg  2 times daily         06/28/24 0207     IP VTE HIGH RISK PATIENT  Once         06/28/24 0207     Place sequential compression device  Until discontinued         06/28/24 0207                    Discharge Planning   ILAN: 6/29/2024     Code Status: Full Code   Is the patient medically ready for discharge?:     Reason for patient still in hospital (select all that apply): Treatment  Discharge Plan A: Home Health   Discharge Delays: None known at this time        Critical care time spent on the evaluation and treatment of severe organ dysfunction, review of pertinent labs and imaging studies, discussions with consulting providers and discussions with patient/family: 32 minutes.      Fabián Altamirano MD  Department of Hospital Medicine   [facility]

## 2024-06-30 NOTE — ASSESSMENT & PLAN NOTE
Secondary to dialysis and hypotension  rapid response called the other day and yesterday   Downgrade from ICU  Continue to monitor vital signs closely.  Patient is considering for DNR   Patient agree for palliative care consult

## 2024-07-01 ENCOUNTER — TELEPHONE (OUTPATIENT)
Dept: PHARMACY | Facility: CLINIC | Age: 84
End: 2024-07-01
Payer: MEDICARE

## 2024-07-01 PROBLEM — Z71.89 GOALS OF CARE, COUNSELING/DISCUSSION: Status: ACTIVE | Noted: 2024-07-01

## 2024-07-01 LAB
ALBUMIN SERPL BCP-MCNC: 3.3 G/DL (ref 3.5–5.2)
ALP SERPL-CCNC: 52 U/L (ref 55–135)
ALT SERPL W/O P-5'-P-CCNC: 13 U/L (ref 10–44)
ANION GAP SERPL CALC-SCNC: 13 MMOL/L (ref 8–16)
AST SERPL-CCNC: 18 U/L (ref 10–40)
BASOPHILS # BLD AUTO: 0.03 K/UL (ref 0–0.2)
BASOPHILS NFR BLD: 0.6 % (ref 0–1.9)
BILIRUB SERPL-MCNC: 0.4 MG/DL (ref 0.1–1)
BUN SERPL-MCNC: 53 MG/DL (ref 8–23)
CALCIUM SERPL-MCNC: 8 MG/DL (ref 8.7–10.5)
CHLORIDE SERPL-SCNC: 98 MMOL/L (ref 95–110)
CO2 SERPL-SCNC: 25 MMOL/L (ref 23–29)
CREAT SERPL-MCNC: 9.2 MG/DL (ref 0.5–1.4)
DIFFERENTIAL METHOD BLD: ABNORMAL
EOSINOPHIL # BLD AUTO: 0.8 K/UL (ref 0–0.5)
EOSINOPHIL NFR BLD: 14.8 % (ref 0–8)
ERYTHROCYTE [DISTWIDTH] IN BLOOD BY AUTOMATED COUNT: 16.6 % (ref 11.5–14.5)
EST. GFR  (NO RACE VARIABLE): 3.9 ML/MIN/1.73 M^2
GLUCOSE SERPL-MCNC: 80 MG/DL (ref 70–110)
HCT VFR BLD AUTO: 38 % (ref 37–48.5)
HGB BLD-MCNC: 11.3 G/DL (ref 12–16)
IMM GRANULOCYTES # BLD AUTO: 0.01 K/UL (ref 0–0.04)
IMM GRANULOCYTES NFR BLD AUTO: 0.2 % (ref 0–0.5)
LYMPHOCYTES # BLD AUTO: 1.1 K/UL (ref 1–4.8)
LYMPHOCYTES NFR BLD: 20.2 % (ref 18–48)
MCH RBC QN AUTO: 26 PG (ref 27–31)
MCHC RBC AUTO-ENTMCNC: 29.7 G/DL (ref 32–36)
MCV RBC AUTO: 88 FL (ref 82–98)
MONOCYTES # BLD AUTO: 0.9 K/UL (ref 0.3–1)
MONOCYTES NFR BLD: 15.9 % (ref 4–15)
NEUTROPHILS # BLD AUTO: 2.6 K/UL (ref 1.8–7.7)
NEUTROPHILS NFR BLD: 48.3 % (ref 38–73)
NRBC BLD-RTO: 0 /100 WBC
OHS QRS DURATION: 72 MS
OHS QTC CALCULATION: 466 MS
PLATELET # BLD AUTO: 158 K/UL (ref 150–450)
PMV BLD AUTO: 10.1 FL (ref 9.2–12.9)
POTASSIUM SERPL-SCNC: 4.6 MMOL/L (ref 3.5–5.1)
PROT SERPL-MCNC: 6.2 G/DL (ref 6–8.4)
RBC # BLD AUTO: 4.34 M/UL (ref 4–5.4)
SODIUM SERPL-SCNC: 136 MMOL/L (ref 136–145)
WBC # BLD AUTO: 5.34 K/UL (ref 3.9–12.7)

## 2024-07-01 PROCEDURE — 80053 COMPREHEN METABOLIC PANEL: CPT | Performed by: STUDENT IN AN ORGANIZED HEALTH CARE EDUCATION/TRAINING PROGRAM

## 2024-07-01 PROCEDURE — 25000003 PHARM REV CODE 250: Performed by: INTERNAL MEDICINE

## 2024-07-01 PROCEDURE — 5A2204Z RESTORATION OF CARDIAC RHYTHM, SINGLE: ICD-10-PCS | Performed by: STUDENT IN AN ORGANIZED HEALTH CARE EDUCATION/TRAINING PROGRAM

## 2024-07-01 PROCEDURE — 99233 SBSQ HOSP IP/OBS HIGH 50: CPT | Mod: ,,, | Performed by: STUDENT IN AN ORGANIZED HEALTH CARE EDUCATION/TRAINING PROGRAM

## 2024-07-01 PROCEDURE — 25000003 PHARM REV CODE 250: Performed by: STUDENT IN AN ORGANIZED HEALTH CARE EDUCATION/TRAINING PROGRAM

## 2024-07-01 PROCEDURE — 63600175 PHARM REV CODE 636 W HCPCS

## 2024-07-01 PROCEDURE — 90935 HEMODIALYSIS ONE EVALUATION: CPT

## 2024-07-01 PROCEDURE — 20000000 HC ICU ROOM

## 2024-07-01 PROCEDURE — 36415 COLL VENOUS BLD VENIPUNCTURE: CPT | Performed by: STUDENT IN AN ORGANIZED HEALTH CARE EDUCATION/TRAINING PROGRAM

## 2024-07-01 PROCEDURE — 94761 N-INVAS EAR/PLS OXIMETRY MLT: CPT

## 2024-07-01 PROCEDURE — 99900031 HC PATIENT EDUCATION (STAT)

## 2024-07-01 PROCEDURE — 25000003 PHARM REV CODE 250

## 2024-07-01 PROCEDURE — 63600175 PHARM REV CODE 636 W HCPCS: Performed by: STUDENT IN AN ORGANIZED HEALTH CARE EDUCATION/TRAINING PROGRAM

## 2024-07-01 PROCEDURE — 85025 COMPLETE CBC W/AUTO DIFF WBC: CPT | Performed by: STUDENT IN AN ORGANIZED HEALTH CARE EDUCATION/TRAINING PROGRAM

## 2024-07-01 RX ORDER — FENTANYL CITRATE 50 UG/ML
INJECTION, SOLUTION INTRAMUSCULAR; INTRAVENOUS
Status: COMPLETED
Start: 2024-07-01 | End: 2024-07-01

## 2024-07-01 RX ORDER — MIDAZOLAM HYDROCHLORIDE 2 MG/2ML
1 INJECTION, SOLUTION INTRAMUSCULAR; INTRAVENOUS ONCE
Status: COMPLETED | OUTPATIENT
Start: 2024-07-01 | End: 2024-07-01

## 2024-07-01 RX ORDER — PHENYLEPHRINE HCL IN 0.9% NACL 20MG/250ML
0-5 PLASTIC BAG, INJECTION (ML) INTRAVENOUS CONTINUOUS
Status: DISCONTINUED | OUTPATIENT
Start: 2024-07-01 | End: 2024-07-04

## 2024-07-01 RX ORDER — DIGOXIN 0.25 MG/ML
INJECTION INTRAMUSCULAR; INTRAVENOUS
Status: COMPLETED
Start: 2024-07-01 | End: 2024-07-01

## 2024-07-01 RX ORDER — PHENYLEPHRINE HCL IN 0.9% NACL 1 MG/10 ML
SYRINGE (ML) INTRAVENOUS
Status: COMPLETED
Start: 2024-07-01 | End: 2024-07-01

## 2024-07-01 RX ORDER — MIDAZOLAM HYDROCHLORIDE 2 MG/2ML
INJECTION, SOLUTION INTRAMUSCULAR; INTRAVENOUS
Status: COMPLETED
Start: 2024-07-01 | End: 2024-07-01

## 2024-07-01 RX ORDER — PHENYLEPHRINE HYDROCHLORIDE 10 MG/ML
INJECTION INTRAVENOUS
Status: COMPLETED
Start: 2024-07-01 | End: 2024-07-01

## 2024-07-01 RX ORDER — DIGOXIN 0.25 MG/ML
250 INJECTION INTRAMUSCULAR; INTRAVENOUS ONCE
Status: COMPLETED | OUTPATIENT
Start: 2024-07-01 | End: 2024-07-01

## 2024-07-01 RX ORDER — FENTANYL CITRATE 50 UG/ML
25 INJECTION, SOLUTION INTRAMUSCULAR; INTRAVENOUS ONCE
Status: COMPLETED | OUTPATIENT
Start: 2024-07-01 | End: 2024-07-01

## 2024-07-01 RX ADMIN — PHENYLEPHRINE HYDROCHLORIDE: 10 INJECTION INTRAVENOUS at 12:07

## 2024-07-01 RX ADMIN — Medication 0.1 MG: at 12:07

## 2024-07-01 RX ADMIN — APIXABAN 2.5 MG: 2.5 TABLET, FILM COATED ORAL at 08:07

## 2024-07-01 RX ADMIN — FENTANYL CITRATE 25 MCG: 50 INJECTION, SOLUTION INTRAMUSCULAR; INTRAVENOUS at 12:07

## 2024-07-01 RX ADMIN — AMIODARONE HYDROCHLORIDE 1 MG/MIN: 1.8 INJECTION, SOLUTION INTRAVENOUS at 06:07

## 2024-07-01 RX ADMIN — MIDAZOLAM HYDROCHLORIDE 1 MG: 1 INJECTION, SOLUTION INTRAMUSCULAR; INTRAVENOUS at 12:07

## 2024-07-01 RX ADMIN — MIDAZOLAM HYDROCHLORIDE 1 MG: 2 INJECTION, SOLUTION INTRAMUSCULAR; INTRAVENOUS at 12:07

## 2024-07-01 RX ADMIN — METOPROLOL TARTRATE 12.5 MG: 25 TABLET, FILM COATED ORAL at 08:07

## 2024-07-01 RX ADMIN — PHENYLEPHRINE HYDROCHLORIDE 0.5 MCG/KG/MIN: 10 INJECTION INTRAVENOUS at 12:07

## 2024-07-01 RX ADMIN — MUPIROCIN 1 G: 20 OINTMENT TOPICAL at 09:07

## 2024-07-01 RX ADMIN — ISOSORBIDE MONONITRATE 30 MG: 30 TABLET, EXTENDED RELEASE ORAL at 11:07

## 2024-07-01 RX ADMIN — ASPIRIN 81 MG: 81 TABLET, COATED ORAL at 11:07

## 2024-07-01 RX ADMIN — SODIUM CHLORIDE, POTASSIUM CHLORIDE, SODIUM LACTATE AND CALCIUM CHLORIDE 500 ML: 600; 310; 30; 20 INJECTION, SOLUTION INTRAVENOUS at 12:07

## 2024-07-01 RX ADMIN — DIGOXIN 250 MCG: 250 INJECTION, SOLUTION INTRAMUSCULAR; INTRAVENOUS; PARENTERAL at 12:07

## 2024-07-01 RX ADMIN — AMIODARONE HYDROCHLORIDE 1 MG/MIN: 1.8 INJECTION, SOLUTION INTRAVENOUS at 12:07

## 2024-07-01 RX ADMIN — MUPIROCIN 1 G: 20 OINTMENT TOPICAL at 08:07

## 2024-07-01 RX ADMIN — DIGOXIN 250 MCG: 0.25 INJECTION INTRAMUSCULAR; INTRAVENOUS at 12:07

## 2024-07-01 RX ADMIN — ATORVASTATIN CALCIUM 40 MG: 40 TABLET, FILM COATED ORAL at 08:07

## 2024-07-01 RX ADMIN — APIXABAN 2.5 MG: 2.5 TABLET, FILM COATED ORAL at 11:07

## 2024-07-01 RX ADMIN — METOPROLOL TARTRATE 12.5 MG: 25 TABLET, FILM COATED ORAL at 11:07

## 2024-07-01 RX ADMIN — DRONEDARONE 400 MG: 400 TABLET, FILM COATED ORAL at 09:07

## 2024-07-01 RX ADMIN — AMIODARONE HYDROCHLORIDE 150 MG: 1.5 INJECTION, SOLUTION INTRAVENOUS at 12:07

## 2024-07-01 NOTE — SUBJECTIVE & OBJECTIVE
Interval History: Patient is seeing during dialysis. She is intermittent hypotensive during dialysis. She is also tachycardic. States she lives with her son and daughter in law and wants to go back home on discharge. States she does not want to work with PT today. Patient was started on phenylephrine by cardiology.     Review of Systems  Objective:     Vital Signs (Most Recent):  Temp: 99 °F (37.2 °C) (07/01/24 1101)  Pulse: (!) 125 (07/01/24 1130)  Resp: (!) 27 (07/01/24 1130)  BP: (!) 73/38 (07/01/24 1130)  SpO2: 97 % (07/01/24 1130) Vital Signs (24h Range):  Temp:  [98 °F (36.7 °C)-99 °F (37.2 °C)] 99 °F (37.2 °C)  Pulse:  [] 125  Resp:  [10-35] 27  SpO2:  [95 %-100 %] 97 %  BP: ()/(38-80) 73/38     Weight: 48.7 kg (107 lb 5.8 oz)  Body mass index is 17.87 kg/m².    Intake/Output Summary (Last 24 hours) at 7/1/2024 1214  Last data filed at 7/1/2024 1058  Gross per 24 hour   Intake 535 ml   Output 0 ml   Net 535 ml         Physical Exam  Vitals and nursing note reviewed.   HENT:      Head: Normocephalic and atraumatic.   Eyes:      Conjunctiva/sclera: Conjunctivae normal.   Neck:      Vascular: No JVD.   Cardiovascular:      Rate and Rhythm: Tachycardia present.      Heart sounds: Normal heart sounds.   Pulmonary:      Effort: Pulmonary effort is normal.      Breath sounds: Normal breath sounds.   Abdominal:      Palpations: Abdomen is soft.   Musculoskeletal:         General: Normal range of motion.   Skin:     General: Skin is warm.   Neurological:      Mental Status: She is alert and oriented to person, place, and time.   Psychiatric:         Mood and Affect: Mood normal.         Significant Labs: All pertinent labs within the past 24 hours have been reviewed.  CBC:   Recent Labs   Lab 06/30/24  0328 07/01/24  0235   WBC 7.44 5.34   HGB 12.4 11.3*   HCT 42.8 38.0    158     CMP:   Recent Labs   Lab 06/30/24  0328 06/30/24  1321 07/01/24  0235    136 136   K 5.9* 5.2* 4.6    100 98    CO2 25 24 25   GLU 54* 106 80   BUN 38* 42* 53*   CREATININE 7.5* 8.1* 9.2*   CALCIUM 8.9 8.9 8.0*   PROT 6.9  --  6.2   ALBUMIN 3.7 3.6 3.3*   BILITOT 0.5  --  0.4   ALKPHOS 59  --  52*   AST 16  --  18   ALT 12  --  13   ANIONGAP 10 12 13       Significant Imaging: I have reviewed all pertinent imaging results/findings within the past 24 hours.

## 2024-07-01 NOTE — NURSING
1130- While patient was getting hemodialysis, patient's heart rate up to 140's in Afib. Dialysis nurse gave a total of 620 ml of NS. Morning medications were given including Lopressor 12.5 mg PO and Dronedarone 400 mg PO.  1137 - Patient still noted to be in AFib RVR with a rate in the 180s, BP 73/45. Dr. Cotton's NP, Ryne Villegas called at this time and notified of patients status and vital signs. Dr. Cotton and Lizette are coming to see patient.   Digoxin 250 mcg IVP x 1 dose given with no change in heart rate. Decision was made by Dr. Cotton to cardiovert the patient.   Patient was then given Versed 1 mg IVP X 1 dose and Fentanyl 25 mcg IVP x 1 dose pre-Cardioversion. Three attempted cardioversions were done, but unsuccessful.   Isaak-Synephrine (100 mcg/ ml syringe) 0.1 mg IVP given then Isaak-Synephrine (20 mg/250 ml bag) titratable infusion started at 0.5 mg.   ml bolus given   Amiodarone loading dose given, then continuous amiodarone infusion started @ 33.3 ml/hr.  1219 -Patient blood pressure 95/54 MAP 70, heart rate 139 Afib  1235- Afib rate controlled @ 75, blood pressure 154/70    Brad Villegas. NP updated on patient's vital signs and patient improving. Will titrate Isaak down as patient tolerates.   Dr. Mott and Dr. Bowens updated also.  1535 - Isaak-Synephrine infusion stopped

## 2024-07-01 NOTE — CONSULTS
[facility]  Palliative Medicine  Consult Note    Patient Name: Tyra Isaac  MRN: [id number]  Admission Date: 6/27/2024  Hospital Length of Stay: 3 days  Code Status: Full Code   Attending Provider: Khadra Bowens MD  Consulting Provider: Tamara Barker NP  Primary Care Physician: Kalpesh Goel MD  Principal Problem:Chest pain    Patient information was obtained from patient, past medical records, ER records, and primary team.      Consults  Assessment/Plan:     Palliative Care  Goals of care, counseling/discussion  I reviewed the patient's chart and discussed the case with the patient's team.      I examined Tyra Isaac at bedside.  The patient maintains capacity for complex medical decision-making.  I spoke with pt at bedside.    I introduced myself and my role as palliative care NP. She was agreeable to speaking.    We discussed the patient's medical illness, prognosis, and values in detail.  Below is a brief summary of our discussion.     Pt is  and lives with her son and daughter in law. She is retired and has been on HD for the past 10 yrs. She states dialysis consumes most of her time but when she is not at dialysis she enjoys being home and spending time with family. She is wheelchair bound and requires mod asst with ADLs. She is Rastafari and finds great strength in her abril.     She understands she is hospitalized for chest pain and heart issues. She has experienced cardiac issues during her HD over the past few days. She has a very good understanding of her medical conditions.    We talked about prognosis. I let her know that her medical team and myself are worried about her and that she is high risk for further cardiac complications. She verbalized understanding and voiced concern as well. I told her that I would not be surprised if she experienced further complications over the coming weeks and months that could result in death. She did not seem surprised by  this news.     We discussed code status. I educated pt on the difference between full code and DNR. She wishes to remain full code for now. Our staff provided pt with advanced care planning information and she would like to review it with her son.     We discussed medical POA and pt states she would want to appoint her son Raffi. Our staff offered to complete this paperwork with her and she declined and stated she would rather wait and do it when he is at the hosptial.     For now pt is willing to cont with HD, invasive procedures and treatment. We did not discuss hospice as it is clear pts goals are not aligned with hospice philosophy.     I appreciate being involved in pts care. I will cont to follow along. Please reach out if I can be of any assistance.         Thank you for your consult. I will follow-up with patient. Please contact us if you have any additional questions.    Subjective:     HPI:   From admission HPI:  Patient is a 84-year-old female with history of atrial fibrillation on Eliquis, ESRD on dialysis Monday Wednesday Friday, CVA presents to emergency room with complaints of chest discomfort.  Patient reports chest discomfort started earlier today lasted about 2 hours, describes it as tightness radiating from the right to left side.  Resolved prior to coming to the emergency room.  Patient reports similar symptoms in the past.  Did have episode of emesis.  Potassium 6.5 on admission, ED physician spoke with Nephrology who ordered stat dialysis, ED physician placed hyperkalemia protocol.  Troponin 39.6.  Patient being admitted for chest pain workup.    Palliative medicine consult:  Pt admitted for cp and hyperkalemia. She underwent stress test which was neg. She was cont on HD. She has experienced cardiac issues and hypotension during each HD session over the past several days. She is a full code. Palliative medicine consulted for goals of care discussion.     Hospital Course:  No notes on  file    Interval History: Pt seen today for palliative medicine consult to discuss goals of care.     Past Medical History:   Diagnosis Date    A-fib     Anxiety     Depression     Disorder of kidney and ureter     Encephalopathy acute 1/1/2018    End stage kidney disease 6/17/2017    Gout     Hyperlipidemia     Hypertension     Moderate episode of recurrent major depressive disorder 1/17/2018    Nephropathy hypertensive, 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    Osteopenia of multiple sites 3/9/2018    Based upon bone density measurements. Patient also has chronic kidney disease.    Stroke 11/2016       Past Surgical History:   Procedure Laterality Date    ANGIOGRAM, CORONARY, WITH LEFT HEART CATHETERIZATION N/A 2/7/2022    Procedure: Angiogram, Coronary, with Left Heart Cath;  Surgeon: Gino Leal MD;  Location: [facility] CATH/EP LAB;  Service: Cardiology;  Laterality: N/A;    CARDIAC SURGERY      stents    EYE SURGERY      WRIST SURGERY         Review of patient's allergies indicates:   Allergen Reactions    Cyclobenzaprine     Fish containing products Hives    Peanut Other (See Comments)    Tramadol Itching       Medications:  Continuous Infusions:   amiodarone in dextrose 5%  1 mg/min Intravenous Continuous 33.3 mL/hr at 07/01/24 1424 1 mg/min at 07/01/24 1424    amiodarone in dextrose 5%  0.5 mg/min Intravenous Continuous        phenylephrine  0-5 mcg/kg/min Intravenous Continuous 14.6 mL/hr at 07/01/24 1429 0.4 mcg/kg/min at 07/01/24 1429     Scheduled Meds:   apixaban  2.5 mg Oral BID    aspirin  81 mg Oral Daily    atorvastatin  40 mg Oral QHS    isosorbide mononitrate  30 mg Oral Daily    metoprolol tartrate  12.5 mg Oral BID    mupirocin   Nasal BID     PRN Meds:  Current Facility-Administered Medications:     0.9% NaCl, , Intravenous, PRN    0.9% NaCl, , Intravenous, PRN     acetaminophen, 650 mg, Oral, Q8H PRN    dextrose 50%, 12.5 g, Intravenous, PRN    dextrose 50%, 25 g, Intravenous, PRN    glucagon (human recombinant), 1 mg, Intramuscular, PRN    glucose, 16 g, Oral, PRN    glucose, 24 g, Oral, PRN    heparin (porcine), 5,000 Units, Intravenous, PRN    heparin (porcine), 5,000 Units, Intravenous, PRN    hydrALAZINE, 10 mg, Intravenous, Q8H PRN    magnesium oxide, 800 mg, Oral, PRN    magnesium oxide, 800 mg, Oral, PRN    melatonin, 6 mg, Oral, Nightly PRN    ondansetron, 8 mg, Intravenous, Q8H PRN    potassium bicarbonate, 35 mEq, Oral, PRN    potassium bicarbonate, 50 mEq, Oral, PRN    potassium bicarbonate, 60 mEq, Oral, PRN    potassium, sodium phosphates, 2 packet, Oral, PRN    potassium, sodium phosphates, 2 packet, Oral, PRN    potassium, sodium phosphates, 2 packet, Oral, PRN    promethazine (PHENERGAN) 12.5 mg in 0.9% NaCl 50 mL IVPB, 12.5 mg, Intravenous, Q6H PRN    sodium chloride 0.9%, 250 mL, Intravenous, PRN    sodium chloride 0.9%, 250 mL, Intravenous, PRN    sodium chloride 0.9%, 10 mL, Intravenous, PRN    Family History       Problem Relation (Age of Onset)    Cancer Father    Heart disease Mother          Tobacco Use    Smoking status: Never    Smokeless tobacco: Never   Substance and Sexual Activity    Alcohol use: No    Drug use: No    Sexual activity: Not Currently       Review of Systems   Constitutional:  Positive for activity change and fatigue.   Respiratory:  Positive for shortness of breath.    Cardiovascular:  Positive for chest pain and palpitations.   Neurological:  Positive for syncope and weakness.   All other systems reviewed and are negative.    Objective:     Vital Signs (Most Recent):  Temp: 99 °F (37.2 °C) (07/01/24 1101)  Pulse: 68 (07/01/24 1430)  Resp: (!) 33 (07/01/24 1430)  BP: 135/66 (07/01/24 1430)  SpO2: 100 % (07/01/24 1430) Vital Signs (24h Range):  Temp:  [98 °F (36.7 °C)-99 °F (37.2 °C)] 99 °F (37.2 °C)  Pulse:  [] 68  Resp:   [10-41] 33  SpO2:  [80 %-100 %] 100 %  BP: ()/(38-80) 135/66     Weight: 48.7 kg (107 lb 5.8 oz)  Body mass index is 17.87 kg/m².       Physical Exam  Vitals and nursing note reviewed.   Constitutional:       General: She is not in acute distress.     Appearance: Normal appearance.   HENT:      Mouth/Throat:      Mouth: Mucous membranes are moist.   Eyes:      General: No scleral icterus.     Conjunctiva/sclera: Conjunctivae normal.   Cardiovascular:      Rate and Rhythm: Normal rate and regular rhythm.   Pulmonary:      Effort: Pulmonary effort is normal. No respiratory distress.   Neurological:      Mental Status: She is alert and oriented to person, place, and time.   Psychiatric:         Mood and Affect: Mood normal.         Behavior: Behavior normal.         Thought Content: Thought content normal.         Judgment: Judgment normal.            Review of Symptoms      Symptom Assessment (ESAS 0-10 Scale)  Pain:  0  Dyspnea:  3  Anxiety:  0  Nausea:  0  Depression:  0  Anorexia:  0  Fatigue:  6  Insomnia:  0  Restlessness:  0  Agitation:  0         Living Arrangements:  Lives with family    Psychosocial/Cultural:   See Palliative Psychosocial Note: No  **Primary  to Follow**  Palliative Care  Consult: No        Advance Care Planning  Advance Directives:   Living Will: No    Do Not Resuscitate Status: No    Medical Power of : No      Decision Making:  Patient answered questions  Goals of Care: The patient endorses that what is most important right now is to focus on quality of life, even if it means sacrificing a little time and curative/life-prolongation (regardless of treatment burdens)    Accordingly, we have decided that the best plan to meet the patient's goals includes continuing with treatment         Significant Labs: All pertinent labs within the past 24 hours have been reviewed.  CBC:   Recent Labs   Lab 07/01/24  0235   WBC 5.34   HGB 11.3*   HCT 38.0   MCV 88         BMP:  Recent Labs   Lab 07/01/24  0235   GLU 80      K 4.6   CL 98   CO2 25   BUN 53*   CREATININE 9.2*   CALCIUM 8.0*     LFT:  Lab Results   Component Value Date    AST 18 07/01/2024    ALKPHOS 52 (L) 07/01/2024    BILITOT 0.4 07/01/2024     Albumin:   Albumin   Date Value Ref Range Status   07/01/2024 3.3 (L) 3.5 - 5.2 g/dL Final     Protein:   Total Protein   Date Value Ref Range Status   07/01/2024 6.2 6.0 - 8.4 g/dL Final     Lactic acid:   Lab Results   Component Value Date    LACTATE 0.7 01/04/2024    LACTATE 2.2 (HH) 01/04/2024       Significant Imaging: I have reviewed all pertinent imaging results/findings within the past 24 hours.      I spent a total of 70 minutes on the day of the visit. This includes face to face time in discussion of goals of care, symptom assessment, coordination of care and emotional support.  This also includes non-face to face time preparing to see the patient (eg, review of tests/imaging), obtaining and/or reviewing separately obtained history, documenting clinical information in the electronic or other health record, independently interpreting results and communicating results to the patient/family/caregiver, or care coordinator.    Tamara Barker NP  Palliative Medicine  [facility]

## 2024-07-01 NOTE — SUBJECTIVE & OBJECTIVE
Interval History: Pt seen today for palliative medicine consult to discuss goals of care.     Past Medical History:   Diagnosis Date    A-fib     Anxiety     Depression     Disorder of kidney and ureter     Encephalopathy acute 1/1/2018    End stage kidney disease 6/17/2017    Gout     Hyperlipidemia     Hypertension     Moderate episode of recurrent major depressive disorder 1/17/2018    Nephropathy hypertensive, 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    Osteopenia of multiple sites 3/9/2018    Based upon bone density measurements. Patient also has chronic kidney disease.    Stroke 11/2016       Past Surgical History:   Procedure Laterality Date    ANGIOGRAM, CORONARY, WITH LEFT HEART CATHETERIZATION N/A 2/7/2022    Procedure: Angiogram, Coronary, with Left Heart Cath;  Surgeon: Gino Leal MD;  Location: [facility] CATH/EP LAB;  Service: Cardiology;  Laterality: N/A;    CARDIAC SURGERY      stents    EYE SURGERY      WRIST SURGERY         Review of patient's allergies indicates:   Allergen Reactions    Cyclobenzaprine     Fish containing products Hives    Peanut Other (See Comments)    Tramadol Itching       Medications:  Continuous Infusions:   amiodarone in dextrose 5%  1 mg/min Intravenous Continuous 33.3 mL/hr at 07/01/24 1424 1 mg/min at 07/01/24 1424    amiodarone in dextrose 5%  0.5 mg/min Intravenous Continuous        phenylephrine  0-5 mcg/kg/min Intravenous Continuous 14.6 mL/hr at 07/01/24 1429 0.4 mcg/kg/min at 07/01/24 1429     Scheduled Meds:   apixaban  2.5 mg Oral BID    aspirin  81 mg Oral Daily    atorvastatin  40 mg Oral QHS    isosorbide mononitrate  30 mg Oral Daily    metoprolol tartrate  12.5 mg Oral BID    mupirocin   Nasal BID     PRN Meds:  Current Facility-Administered Medications:     0.9% NaCl, , Intravenous, PRN    0.9% NaCl, , Intravenous, PRN     acetaminophen, 650 mg, Oral, Q8H PRN    dextrose 50%, 12.5 g, Intravenous, PRN    dextrose 50%, 25 g, Intravenous, PRN    glucagon (human recombinant), 1 mg, Intramuscular, PRN    glucose, 16 g, Oral, PRN    glucose, 24 g, Oral, PRN    heparin (porcine), 5,000 Units, Intravenous, PRN    heparin (porcine), 5,000 Units, Intravenous, PRN    hydrALAZINE, 10 mg, Intravenous, Q8H PRN    magnesium oxide, 800 mg, Oral, PRN    magnesium oxide, 800 mg, Oral, PRN    melatonin, 6 mg, Oral, Nightly PRN    ondansetron, 8 mg, Intravenous, Q8H PRN    potassium bicarbonate, 35 mEq, Oral, PRN    potassium bicarbonate, 50 mEq, Oral, PRN    potassium bicarbonate, 60 mEq, Oral, PRN    potassium, sodium phosphates, 2 packet, Oral, PRN    potassium, sodium phosphates, 2 packet, Oral, PRN    potassium, sodium phosphates, 2 packet, Oral, PRN    promethazine (PHENERGAN) 12.5 mg in 0.9% NaCl 50 mL IVPB, 12.5 mg, Intravenous, Q6H PRN    sodium chloride 0.9%, 250 mL, Intravenous, PRN    sodium chloride 0.9%, 250 mL, Intravenous, PRN    sodium chloride 0.9%, 10 mL, Intravenous, PRN    Family History       Problem Relation (Age of Onset)    Cancer Father    Heart disease Mother          Tobacco Use    Smoking status: Never    Smokeless tobacco: Never   Substance and Sexual Activity    Alcohol use: No    Drug use: No    Sexual activity: Not Currently       Review of Systems   Constitutional:  Positive for activity change and fatigue.   Respiratory:  Positive for shortness of breath.    Cardiovascular:  Positive for chest pain and palpitations.   Neurological:  Positive for syncope and weakness.   All other systems reviewed and are negative.    Objective:     Vital Signs (Most Recent):  Temp: 99 °F (37.2 °C) (07/01/24 1101)  Pulse: 68 (07/01/24 1430)  Resp: (!) 33 (07/01/24 1430)  BP: 135/66 (07/01/24 1430)  SpO2: 100 % (07/01/24 1430) Vital Signs (24h Range):  Temp:  [98 °F (36.7 °C)-99 °F (37.2 °C)] 99 °F (37.2 °C)  Pulse:  [] 68  Resp:   [10-41] 33  SpO2:  [80 %-100 %] 100 %  BP: ()/(38-80) 135/66     Weight: 48.7 kg (107 lb 5.8 oz)  Body mass index is 17.87 kg/m².       Physical Exam  Vitals and nursing note reviewed.   Constitutional:       General: She is not in acute distress.     Appearance: Normal appearance.   HENT:      Mouth/Throat:      Mouth: Mucous membranes are moist.   Eyes:      General: No scleral icterus.     Conjunctiva/sclera: Conjunctivae normal.   Cardiovascular:      Rate and Rhythm: Normal rate and regular rhythm.   Pulmonary:      Effort: Pulmonary effort is normal. No respiratory distress.   Neurological:      Mental Status: She is alert and oriented to person, place, and time.   Psychiatric:         Mood and Affect: Mood normal.         Behavior: Behavior normal.         Thought Content: Thought content normal.         Judgment: Judgment normal.            Review of Symptoms      Symptom Assessment (ESAS 0-10 Scale)  Pain:  0  Dyspnea:  3  Anxiety:  0  Nausea:  0  Depression:  0  Anorexia:  0  Fatigue:  6  Insomnia:  0  Restlessness:  0  Agitation:  0         Living Arrangements:  Lives with family    Psychosocial/Cultural:   See Palliative Psychosocial Note: No  **Primary  to Follow**  Palliative Care  Consult: No        Advance Care Planning   Advance Directives:   Living Will: No    Do Not Resuscitate Status: No    Medical Power of : No      Decision Making:  Patient answered questions  Goals of Care: The patient endorses that what is most important right now is to focus on quality of life, even if it means sacrificing a little time and curative/life-prolongation (regardless of treatment burdens)    Accordingly, we have decided that the best plan to meet the patient's goals includes continuing with treatment         Significant Labs: All pertinent labs within the past 24 hours have been reviewed.  CBC:   Recent Labs   Lab 07/01/24  0235   WBC 5.34   HGB 11.3*   HCT 38.0   MCV 88         BMP:  Recent Labs   Lab 07/01/24  0235   GLU 80      K 4.6   CL 98   CO2 25   BUN 53*   CREATININE 9.2*   CALCIUM 8.0*     LFT:  Lab Results   Component Value Date    AST 18 07/01/2024    ALKPHOS 52 (L) 07/01/2024    BILITOT 0.4 07/01/2024     Albumin:   Albumin   Date Value Ref Range Status   07/01/2024 3.3 (L) 3.5 - 5.2 g/dL Final     Protein:   Total Protein   Date Value Ref Range Status   07/01/2024 6.2 6.0 - 8.4 g/dL Final     Lactic acid:   Lab Results   Component Value Date    LACTATE 0.7 01/04/2024    LACTATE 2.2 (HH) 01/04/2024       Significant Imaging: I have reviewed all pertinent imaging results/findings within the past 24 hours.

## 2024-07-01 NOTE — ASSESSMENT & PLAN NOTE
This patient has hyperkalemia which is uncontrolled. We will monitor for arrhythmias with EKG or continuous telemetry. We will treat the hyperkalemia with IV insulin and dextrose and Nebulized albuterol sulfate. The likely etiology of the hyperkalemia is ESRD.  The patients latest potassium has been reviewed and the results are listed below  Recent Labs   Lab 07/01/24  0235   K 4.6     Management per dialysis

## 2024-07-01 NOTE — PROGRESS NOTES
07/01/24 1130   Required for all Hemodialysis Patients   Hepatitis Status negative   Handoff Report   Received From AMY García   Given To AMY Nina   Treatment Type   Treatment Type Maintenance   Vital Signs   Pulse (!) 125   Resp (!) 27   SpO2 97 %   BP (!) 73/38   MAP (mmHg) 47        Hemodialysis AV Fistula Right upper arm   No Placement Date or Time found.   Present Prior to Hospital Arrival?: Yes  Location: Right upper arm   Needle Size 15ga   Site Assessment Clean;Dry;Intact   Patency Present;Thrill;Bruit   Status Deaccessed   Site Condition No complications   Dressing Gauze   Post-Hemodialysis Assessment   Rinseback Volume (mL) 250 mL   Blood Volume Processed (Liters) 49.4 L   Dialyzer Clearance Lightly streaked   Duration of Treatment 135 minutes   Additional Fluid Intake (mL) 800 mL   Total UF (mL) 180 mL   Net Fluid Removal -520   Patient Response to Treatment Did not tolerate with 45 mins left went in to afib with hr 150's.   Post-Treatment Weight 50.5 kg (111 lb 5.3 oz)   Treatment Weight Change 0.2   Arterial bleeding stop time (min) 6 min   Venous bleeding stop time (min) 8 min   Post-Hemodialysis Comments tx ended d/t high hr. MD aware pt left with positive balance of 520 cc.     Pt did not tolerate hd with 45 mins left of tx pt hr went to 150's.  Pt with positive net uf of 520 cc.  B/p low after tx completed and pt rinsed back 200 cc of ns given via arterial line b/p 83/59.  Md at bedside. Report given to primary nurse.

## 2024-07-01 NOTE — NURSING
Nurses Note -- 4 Eyes      6/30/2024   9:12 PM      Skin assessed during: Q Shift Change      [x] No Altered Skin Integrity Present    []Prevention Measures Documented      [] Yes- Altered Skin Integrity Present or Discovered   [] LDA Added if Not in Epic (Describe Wound)   [] New Altered Skin Integrity was Present on Admit and Documented in LDA   [] Wound Image Taken    Wound Care Consulted? No    Attending Nurse:  Aliyah Henao RN/Staff Member:  AMY Estrella

## 2024-07-01 NOTE — PROGRESS NOTES
[facility] Medicine  Progress Note    Patient Name: Tyra Isaac  MRN: [id number]  Patient Class: IP- Inpatient   Admission Date: 6/27/2024  Length of Stay: 3 days  Attending Physician: Khadra Bowens MD  Primary Care Provider: Kalpesh Goel MD        Subjective:     Principal Problem:Chest pain        HPI:  Patient is a 84-year-old female with history of atrial fibrillation on Eliquis, ESRD on dialysis Monday Wednesday Friday, CVA presents to emergency room with complaints of chest discomfort.  Patient reports chest discomfort started earlier today lasted about 2 hours, describes it as tightness radiating from the right to left side.  Resolved prior to coming to the emergency room.  Patient reports similar symptoms in the past.  Did have episode of emesis.  Potassium 6.5 on admission, ED physician spoke with Nephrology who ordered stat dialysis, ED physician placed hyperkalemia protocol.  Troponin 39.6.  Patient being admitted for chest pain workup.    Overview/Hospital Course:  Patient was admitted with chest pain and hyperkalemia.  Her EKG was nonischemic and her troponins were noted to be elevated, which is baseline for her 2/2 ESRD. She remained chest pain free during her stay.  Nephrology was consulted for routine dialysis.  She was maintained on telemetry and cardiology was consulted.  She received medications to shift her potassium and dialysis was ordered.  Her dialysis was cut short due to syncopal episode with hypotension.  She was monitored overnight.  She went for stress testing, which was negative for reversible ischemia.  Dialysis was performed the next day and had to be stopped secondary to SVT.   During hospital stay patient got syncopal episode during dialysis and it was stopped.  Cardiology reviewed and stress test ordered and came back negative.  She had another episode of severe tachycardia after dialysis and needed Lopressor and Cardizem IV and moved to ICU.   Later beta-blocker added(initially patient was on beta-blocker for unsure reason possible hypotension) rate and blood pressure better controlled and downgraded.  Code status discussed in detail with the patient and she agreed for consultation to palliative care, however patient wants to remain full code.        Interval History: Patient is seeing during dialysis. She is intermittent hypotensive during dialysis. She is also tachycardic. States she lives with her son and daughter in law and wants to go back home on discharge. States she does not want to work with PT today. Patient was started on phenylephrine by cardiology.     Review of Systems  Objective:     Vital Signs (Most Recent):  Temp: 99 °F (37.2 °C) (07/01/24 1101)  Pulse: (!) 125 (07/01/24 1130)  Resp: (!) 27 (07/01/24 1130)  BP: (!) 73/38 (07/01/24 1130)  SpO2: 97 % (07/01/24 1130) Vital Signs (24h Range):  Temp:  [98 °F (36.7 °C)-99 °F (37.2 °C)] 99 °F (37.2 °C)  Pulse:  [] 125  Resp:  [10-35] 27  SpO2:  [95 %-100 %] 97 %  BP: ()/(38-80) 73/38     Weight: 48.7 kg (107 lb 5.8 oz)  Body mass index is 17.87 kg/m².    Intake/Output Summary (Last 24 hours) at 7/1/2024 1214  Last data filed at 7/1/2024 1058  Gross per 24 hour   Intake 535 ml   Output 0 ml   Net 535 ml         Physical Exam  Vitals and nursing note reviewed.   HENT:      Head: Normocephalic and atraumatic.   Eyes:      Conjunctiva/sclera: Conjunctivae normal.   Neck:      Vascular: No JVD.   Cardiovascular:      Rate and Rhythm: Tachycardia present.      Heart sounds: Normal heart sounds.   Pulmonary:      Effort: Pulmonary effort is normal.      Breath sounds: Normal breath sounds.   Abdominal:      Palpations: Abdomen is soft.   Musculoskeletal:         General: Normal range of motion.   Skin:     General: Skin is warm.   Neurological:      Mental Status: She is alert and oriented to person, place, and time.   Psychiatric:         Mood and Affect: Mood normal.         Significant  Labs: All pertinent labs within the past 24 hours have been reviewed.  CBC:   Recent Labs   Lab 06/30/24  0328 07/01/24  0235   WBC 7.44 5.34   HGB 12.4 11.3*   HCT 42.8 38.0    158     CMP:   Recent Labs   Lab 06/30/24  0328 06/30/24  1321 07/01/24  0235    136 136   K 5.9* 5.2* 4.6    100 98   CO2 25 24 25   GLU 54* 106 80   BUN 38* 42* 53*   CREATININE 7.5* 8.1* 9.2*   CALCIUM 8.9 8.9 8.0*   PROT 6.9  --  6.2   ALBUMIN 3.7 3.6 3.3*   BILITOT 0.5  --  0.4   ALKPHOS 59  --  52*   AST 16  --  18   ALT 12  --  13   ANIONGAP 10 12 13       Significant Imaging: I have reviewed all pertinent imaging results/findings within the past 24 hours.    Assessment/Plan:      * Chest pain  Troponin 39.6 --> 61.9  Echo show normal EF   Stress test  negative for reversible ischemia  aspirin daily  Lopressor  Follow cardiology           Hyperkalemia  This patient has hyperkalemia which is uncontrolled. We will monitor for arrhythmias with EKG or continuous telemetry. We will treat the hyperkalemia with IV insulin and dextrose and Nebulized albuterol sulfate. The likely etiology of the hyperkalemia is ESRD.  The patients latest potassium has been reviewed and the results are listed below  Recent Labs   Lab 07/01/24  0235   K 4.6     Management per dialysis           ESRD (end stage renal disease)  Creatine stable for now. BMP reviewed- noted Estimated Creatinine Clearance: 3.5 mL/min (A) (based on SCr of 9.2 mg/dL (H)). according to latest data. Based on current GFR, CKD stage is end stage.  Monitor UOP and serial BMP and adjust therapy as needed. Renally dose meds. Avoid nephrotoxic medications and procedures.  Nephrology follow up for HD     Atrial fibrillation  On Eliquis and Multaq outpatient - continue  Had episode of tachycardia during dialysis - EKG ordered.- Atrial fib noted.  Cardiology following.   Cont lopressor     Syncope and collapse  Secondary to dialysis and hypotension  Continue to monitor vital  signs closely.  Patient agreed for palliative care consult  Patient still get hypotensive during dialysis   Phenylphrine was started by cardiology         VTE Risk Mitigation (From admission, onward)           Ordered     heparin (porcine) injection 5,000 Units  As needed (PRN)         06/29/24 1908     heparin (porcine) injection 5,000 Units  As needed (PRN)         06/29/24 1908     apixaban tablet 2.5 mg  2 times daily         06/28/24 0207     IP VTE HIGH RISK PATIENT  Once         06/28/24 0207     Place sequential compression device  Until discontinued         06/28/24 0207                    Discharge Planning   ILAN: 7/2/2024     Code Status: Full Code   Is the patient medically ready for discharge?:     Reason for patient still in hospital (select all that apply): Treatment  Discharge Plan A: Home Health   Discharge Delays: None known at this time            Khadra Bowens MD  Department of Hospital Medicine   [facility]

## 2024-07-01 NOTE — PROGRESS NOTES
INPATIENT NEPHROLOGY Progress  [facility] NEPHROLOGY    Tyra Isaac  07/01/2024    Reason for consultation:    esrd    Chief Complaint:   Chief Complaint   Patient presents with    Chest Pain     Pt says she has been having pain in the center of her chest for the last two hours.  Pt sad she threw up prior to the pain.  Pt has hx of afib          History of Present Illness:    Per H and P   Patient is a 84-year-old female with history of atrial fibrillation on Eliquis, ESRD on dialysis Monday Wednesday Friday, CVA presents to emergency room with complaints of chest discomfort.  Patient reports chest discomfort started earlier today lasted about 2 hours, describes it as tightness radiating from the right to left side.  Resolved prior to coming to the emergency room.  Patient reports similar symptoms in the past.  Did have episode of emesis.  Potassium 6.5 on admission, ED physician spoke with Nephrology who ordered stat dialysis, ED physician placed hyperkalemia protocol.  Troponin 39.6.  Patient being admitted for chest pain workup.     6/28  Patient seen on hemodialysis for uremic clearance and ultrafiltration.    Cp resolved.  No sob.    Addendum:  --about 30 minutes after I saw her she had a syncopal episode.  Her blood sugar was 90.   Her blood was rinsed back.   She never became pulseless.  She woke up after the blood was returned.  She briefly had hypotension.  Her bp came up to 150s/80s.   She woke up and was cognitive and appropriate.  No chest pain or sob.   She received about an hour and a half of dialysis  6/29  665cc off w/HD yesterday.  VSS, no chemistry results.  LADI for stress test, then will have dialysis--only got about an hour and a half of yesterday's tx.  6/30 nuclear stress test neg- went into SVT during last 30min of HD so treatment terminated with rapid response called- patient given metoprolol IV, diltiazem IV and 250cc NS bolus and moved to ICU- got off 800cc UF yest- HR < 100, BP  stable, on RA- started on oral beta blocker    7/1  AFebrile, intermittent hypotension.  C/o coughing more today    Plan of Care:     ESRD on HD MWF  Syncope  Chest pain  AF/SVT  Elevated trop- demand ischemia  HTN  Hyperkalemia  SHPT  Anemia of CKD  Hypoglycemia  pCRX      - continue HD MWF  - syncope- hypoglycemia- management per   - chest pain- nuclear stress test neg  - cardiology started a low dose beta blocker- monitor for bradycardia  - trend troponin- patient may not be able to afford eliquis- nurse looking into it  - BP controlled- UF is very challenging due to hemodynamic instability- will have TBD goal on HD days right now- liberalize fluids to 1.5L since there may be a component of volume depletion  - hyperK noted this AM- got kayexalate- repeat renal at 2pm- on renal diet  - rechecked phos--at goal  - no acute BRAYAN needs  - BG is low- defer management to     Patient target weight 46kg- usually only pulled down to 46.5.  Looks at [employer] flow sheets- barely take off 500-1L per treatment.  Will go ahead and change dry weight to 47kg for now and reassess.    Discharge pending medication acquisition and repeat renal panel to f/u high K. Also need to make sure she doesn't remain hypoglycemic.    Thank you for allowing us to participate in this patient's care. We will continue to follow.    Vital Signs:  Temp Readings from Last 3 Encounters:   07/01/24 98.1 °F (36.7 °C) (Oral)   06/03/24 98.5 °F (36.9 °C) (Oral)   03/19/24 97.4 °F (36.3 °C) (Tympanic)       Pulse Readings from Last 3 Encounters:   07/01/24 81   06/06/24 71   06/03/24 69       BP Readings from Last 3 Encounters:   07/01/24 100/80   06/06/24 (!) 122/56   06/03/24 130/60       Weight:  Wt Readings from Last 3 Encounters:   06/28/24 48.7 kg (107 lb 5.8 oz)   06/28/24 48.7 kg (107 lb 5.8 oz)   06/06/24 46.7 kg (103 lb)     Medications:  No current facility-administered medications on file prior to encounter.     Current Outpatient Medications on  File Prior to Encounter   Medication Sig Dispense Refill    atorvastatin (LIPITOR) 40 MG tablet Take 40 mg by mouth once daily.      b complex vitamins tablet Take 1 tablet by mouth once daily.      calcium acetate,phosphat bind, (PHOSLO) 667 mg capsule Take 667 mg by mouth 3 (three) times daily with meals.      dorzolamide-timolol 2-0.5% (COSOPT) 22.3-6.8 mg/mL ophthalmic solution Place 1 drop into both eyes 2 (two) times daily.      dronedarone (MULTAQ) 400 mg Tab Take 1 tablet (400 mg total) by mouth 2 (two) times daily with meals. 180 tablet 0    ELIQUIS 2.5 mg Tab Take 2.5 mg by mouth 2 (two) times daily.      latanoprost 0.005 % ophthalmic solution Place 1 drop into both eyes every evening.      loperamide (IMODIUM A-D) 2 mg Tab Take 1 tablet (2 mg total) by mouth 4 (four) times daily as needed (diarrhea). 3 tablet 0    midodrine (PROAMATINE) 10 MG tablet Take 1 tablet (10 mg total) by mouth 3 (three) times daily with meals. 270 tablet 0    ondansetron (ZOFRAN-ODT) 4 MG TbDL Take 1 tablet (4 mg total) by mouth every 6 (six) hours as needed (nausea). 30 tablet 5     Scheduled Meds:   apixaban  2.5 mg Oral BID    aspirin  81 mg Oral Daily    atorvastatin  40 mg Oral QHS    dronedarone  400 mg Oral BID WM    isosorbide mononitrate  30 mg Oral Daily    metoprolol tartrate  12.5 mg Oral BID    mupirocin   Nasal BID     Continuous Infusions:  PRN Meds:.  Current Facility-Administered Medications:     0.9% NaCl, , Intravenous, PRN    0.9% NaCl, , Intravenous, PRN    acetaminophen, 650 mg, Oral, Q8H PRN    dextrose 50%, 12.5 g, Intravenous, PRN    dextrose 50%, 25 g, Intravenous, PRN    glucagon (human recombinant), 1 mg, Intramuscular, PRN    glucose, 16 g, Oral, PRN    glucose, 24 g, Oral, PRN    heparin (porcine), 5,000 Units, Intravenous, PRN    heparin (porcine), 5,000 Units, Intravenous, PRN    hydrALAZINE, 10 mg, Intravenous, Q8H PRN    magnesium oxide, 800 mg, Oral, PRN    magnesium oxide, 800 mg, Oral, PRN     "melatonin, 6 mg, Oral, Nightly PRN    ondansetron, 8 mg, Intravenous, Q8H PRN    potassium bicarbonate, 35 mEq, Oral, PRN    potassium bicarbonate, 50 mEq, Oral, PRN    potassium bicarbonate, 60 mEq, Oral, PRN    potassium, sodium phosphates, 2 packet, Oral, PRN    potassium, sodium phosphates, 2 packet, Oral, PRN    potassium, sodium phosphates, 2 packet, Oral, PRN    promethazine (PHENERGAN) 12.5 mg in 0.9% NaCl 50 mL IVPB, 12.5 mg, Intravenous, Q6H PRN    sodium chloride 0.9%, 250 mL, Intravenous, PRN    sodium chloride 0.9%, 250 mL, Intravenous, PRN    sodium chloride 0.9%, 10 mL, Intravenous, PRN    Review of Systems:  Neg    Physical Exam:    /80 (BP Location: Left leg, Patient Position: Lying)   Pulse 81   Temp 98.1 °F (36.7 °C) (Oral)   Resp (!) 29   Ht 5' 5" (1.651 m)   Wt 48.7 kg (107 lb 5.8 oz)   SpO2 98%   BMI 17.87 kg/m²     General Appearance:    Alert, cooperative, no distress, appears stated age   Head:    Normocephalic, without obvious abnormality, atraumatic   Eyes:    PER, conjunctiva/corneas clear, EOM's intact in both eyes        Throat:   Lips, mucosa, and tongue normal; teeth and gums normal   Back:     Symmetric, no curvature, ROM normal, no CVA tenderness   Lungs:     Clear to auscultation bilaterally, respirations unlabored   Chest wall:    No tenderness or deformity   Heart:    Regular rate and rhythm, S1 and S2 normal, no murmur, rub   or gallop   Abdomen:     Soft, non-tender, bowel sounds active all four quadrants,     no masses, no organomegaly   Extremities:   Extremities normal, atraumatic, no cyanosis or edema   Pulses:   2+ and symmetric all extremities   MSK:   No joint or muscle swelling, tenderness or deformity   Skin:   Skin color, texture, turgor normal, no rashes or lesions   Neurologic:   CNII-XII intact, normal strength and sensation       Throughout.  No flap     Results:  Lab Results   Component Value Date     07/01/2024    K 4.6 07/01/2024    CL 98 " 07/01/2024    CO2 25 07/01/2024    BUN 53 (H) 07/01/2024    CREATININE 9.2 (H) 07/01/2024    CALCIUM 8.0 (L) 07/01/2024    ANIONGAP 13 07/01/2024    ESTGFRAFRICA 4.7 (A) 02/08/2022    EGFRNONAA 4.0 (A) 02/08/2022       Lab Results   Component Value Date    CALCIUM 8.0 (L) 07/01/2024    PHOS 4.6 (H) 06/30/2024       Recent Labs   Lab 07/01/24  0235   WBC 5.34   RBC 4.34   HGB 11.3*   HCT 38.0      MCV 88   MCH 26.0*   MCHC 29.7*        Imaging Results              X-Ray Chest AP Portable (Final result)  Result time 06/28/24 01:10:09      Final result by Aldo Espinosa MD (06/28/24 01:10:09)                   Impression:      Radiographic findings as above.      Electronically signed by: Aldo Espinosa  Date:    06/28/2024  Time:    01:10               Narrative:    EXAMINATION:  XR CHEST AP PORTABLE    CLINICAL HISTORY:  Chest Pain;    TECHNIQUE:  Single frontal view of the chest was performed.    COMPARISON:  Prior examinations, most recent of which is March 19, 2024    FINDINGS:  Single portable chest view is submitted.  When accounting for position and technique and depth of inspiration the appearance of the cardiomediastinal silhouette is stable.  Aortic atherosclerotic change and mild tortuosity is noted.    Mild accentuation of interstitial markings may relate to a pattern of mild interstitial infiltrate/edema.  There is no focal consolidation, there is no pleural effusion or pneumothorax.  Skin folds overlie the patient.    The osseous structures demonstrate chronic change.                        Wet Read by Jacquie Wang MD (06/28/24 00:32:39, [facility] - Emergency Dept, Emergency Medicine)    No pulmonary infiltrates no pulmonary edema no cardiomegaly no pleural effusion                                    Patient care was time spent personally by me on the following activities: > 35 min  Obtaining a history  Examination of patient.  Providing medical care at the patients  bedside.  Developing a treatment plan with patient or surrogate and bedside caregivers  Ordering and reviewing laboratory studies, radiographic studies, pulse oximetry.  Ordering and performing treatments and interventions.  Evaluation of patient's response to treatment.  Discussions with consultants while on the unit and immediately available to the patient.  Re-evaluation of the patient's condition.  Documentation in the medical record.       I have personally reviewed pertinent radiological imaging and reports.    Jimenez Valero MD    [facility] [place]  [phone]

## 2024-07-01 NOTE — HPI
From admission HPI:  Patient is a 84-year-old female with history of atrial fibrillation on Eliquis, ESRD on dialysis Monday Wednesday Friday, CVA presents to emergency room with complaints of chest discomfort.  Patient reports chest discomfort started earlier today lasted about 2 hours, describes it as tightness radiating from the right to left side.  Resolved prior to coming to the emergency room.  Patient reports similar symptoms in the past.  Did have episode of emesis.  Potassium 6.5 on admission, ED physician spoke with Nephrology who ordered stat dialysis, ED physician placed hyperkalemia protocol.  Troponin 39.6.  Patient being admitted for chest pain workup.    Palliative medicine consult:  Pt admitted for cp and hyperkalemia. She underwent stress test which was neg. She was cont on HD. She has experienced cardiac issues and hypotension during each HD session over the past several days. She is a full code. Palliative medicine consulted for goals of care discussion.

## 2024-07-01 NOTE — PLAN OF CARE
Problem: Adult Inpatient Plan of Care  Goal: Patient-Specific Goal (Individualized)  Outcome: Progressing  Goal: Absence of Hospital-Acquired Illness or Injury  Outcome: Progressing  Goal: Optimal Comfort and Wellbeing  Outcome: Progressing  Goal: Readiness for Transition of Care  Outcome: Progressing     Problem: Hemodialysis  Goal: Absence of Infection Signs and Symptoms  Outcome: Progressing     Problem: Skin Injury Risk Increased  Goal: Skin Health and Integrity  Outcome: Progressing     Problem: Fall Injury Risk  Goal: Absence of Fall and Fall-Related Injury  Outcome: Progressing     Problem: Dysrhythmia  Goal: Normalized Cardiac Rhythm  Outcome: Progressing     Problem: Coping Ineffective  Goal: Effective Coping  Outcome: Progressing

## 2024-07-01 NOTE — ASSESSMENT & PLAN NOTE
On Eliquis and Multaq outpatient - continue  Had episode of tachycardia during dialysis - EKG ordered.- Atrial fib noted.  Cardiology following.   Cont lopressor

## 2024-07-01 NOTE — TELEPHONE ENCOUNTER
Tyra Isaac has been informed of the [employer] application process for Eliquis and Multaq and what's required to apply.  She will provide the following documents: Proof of household Income( such as social security statement, 1099 form, pension statement or 3 consecutive pay stubs and ·Completed [facility] Authorization Forms (see attached)     Gave information on the [employer]  program for Eliquis    Follow-up will be made in 5 business days.     Pilar Cook  Pharmacy Patient Assistance Team

## 2024-07-01 NOTE — ASSESSMENT & PLAN NOTE
Secondary to dialysis and hypotension  Continue to monitor vital signs closely.  Patient agreed for palliative care consult  Patient still get hypotensive during dialysis   Phenylphrine was started by cardiology

## 2024-07-01 NOTE — ASSESSMENT & PLAN NOTE
Troponin 39.6 --> 61.9  Echo show normal EF   Stress test  negative for reversible ischemia  aspirin daily  Lopressor  Follow cardiology

## 2024-07-01 NOTE — RESPIRATORY THERAPY
06/30/24 1952   Patient Assessment/Suction   Level of Consciousness (AVPU) alert   Respiratory Effort Normal;Unlabored   Expansion/Accessory Muscles/Retractions no retractions;no use of accessory muscles   All Lung Fields Breath Sounds Anterior:;clear;equal bilaterally   Rhythm/Pattern, Respiratory no shortness of breath reported;unlabored;pattern regular;depth regular   Cough Frequency no cough   PRE-TX-O2   Device (Oxygen Therapy) room air   SpO2 98 %   Pulse Oximetry Type Continuous   $ Pulse Oximetry - Multiple Charge Pulse Oximetry - Multiple   Pulse 90   Resp (!) 27   Positioning HOB elevated 30 degrees   Education   $ Education 15 min;Other (see comment)

## 2024-07-01 NOTE — PROGRESS NOTES
[facility]  Department of Cardiology  Progress Note      PATIENT NAME: Tyra Isaac  MRN: [id number]  TODAY'S DATE: 07/01/2024  ADMIT DATE: 6/27/2024                          CONSULT REQUESTED BY: Khadra Bowens MD    SUBJECTIVE     PRINCIPAL PROBLEM: Chest pain    07/01/2024  RN called this morning to report AFib RVR during dialysis.  Upon arrival to the bedside, patient was noted to be in AFib RVR with a rate in the 180s, BP 70s over 30s.  250 of digoxin was pushed per verbal MD order.  Zoll was then connected to the patient.  Three attempted cardioversions - failed.  Patient was then bolused with 500 cc of LR, bolused with amiodarone, and started on a peter drip for hypotension.    06/30/2024  Pt seen this morning resting comfortably in ICU. Remains in NSR with rate in 80s. She states she is feeling well today however is concerned about costs of medications.     6/29/2024  Patient seen today in stress lab and then later in the dialysis.  Patient had been tolerating dialysis well but then developed an elevated heart rate in the 160s which quickly settled down to the 110s after dialysis was stopped.  Patient reports having chest pain only whenever she has dialysis and does not have any with exertion.  Her nuclear stress test is negative for reversible ischemia.    REASON FOR CONSULT:  Chest pain, elevated troponin      HPI:    Patient is a 84-year-old female with history of atrial fibrillation on Eliquis, ESRD on dialysis Monday Wednesday Friday, CVA presents to emergency room with complaints of chest tightness x2 hours prior to arrival.  Chest pain resolved in ED but reports associated shortness of breath, nausea, vomiting, and dizziness when CP was occuring.  Hyperkalemia in ED with K of 6.5.  Nephrology consulted, stat dialysis performed.     Trivially elevated troponin HS.  39 > 61.  Chronically elevated troponin HS likely due to ESRD on HD. No acute ST-T wave changes.      During  examination, patient is resting in bed.  Dialysis performed. Hypertensive this afternoon. SR on tele.  C/o HA.  CP improved.      ECHO 1/5/24    Left Ventricle: The left ventricle is normal in size. Normal wall thickness. Normal wall motion. There is normal systolic function with a visually estimated ejection fraction of 60 - 65%. There is diastolic dysfunction.    Right Ventricle: Normal right ventricular cavity size. Wall thickness is normal. Right ventricle wall motion  is normal. Systolic function is normal.    Left Atrium: Left atrium is severely dilated.    Mitral Valve: There is moderate bileaflet sclerosis. There is moderate mitral annular calcification present. There is mild to moderate regurgitation with a centrally directed jet.    Tricuspid Valve: There is mild to moderate regurgitation.    Pulmonary Artery: There is mild pulmonary hypertension. The estimated pulmonary artery systolic pressure is 41 mmHg.    IVC/SVC: Normal venous pressure at 3 mmHg.    CATH 2/7/22  The left ventricular systolic function was normal.  The left ventricular end diastolic pressure was elevated.  The pre-procedure left ventricular end diastolic pressure was 20.  The post-procedure left ventricular end diastolic pressure was 31.  The ejection fraction was calculated to be 60%.  The estimated blood loss was none.  Left main is patent, lad patent moderate to severe tortuosity.  Left circumflex artery proximal stent is patent moderate to severe tortuosity, ramus intermedius is patent.  RCA is small caliber is patent moderate tortuosity. Non dominant vessel.        From H&P   Chest Pain       Pt says she has been having pain in the center of her chest for the last two hours.  Pt sad she threw up prior to the pain.  Pt has hx of afib         HPI: Patient is a 84-year-old female with history of atrial fibrillation on Eliquis, ESRD on dialysis Monday Wednesday Friday, CVA presents to emergency room with complaints of chest  discomfort.  Patient reports chest discomfort started earlier today lasted about 2 hours, describes it as tightness radiating from the right to left side.  Resolved prior to coming to the emergency room.  Patient reports similar symptoms in the past.  Did have episode of emesis.  Potassium 6.5 on admission, ED physician spoke with Nephrology who ordered stat dialysis, ED physician placed hyperkalemia protocol.  Troponin 39.6.  Patient being admitted for chest pain workup.      Review of patient's allergies indicates:   Allergen Reactions    Cyclobenzaprine     Fish containing products Hives    Peanut Other (See Comments)    Tramadol Itching       Past Medical History:   Diagnosis Date    A-fib     Anxiety     Depression     Disorder of kidney and ureter     Encephalopathy acute 1/1/2018    End stage kidney disease 6/17/2017    Gout     Hyperlipidemia     Hypertension     Moderate episode of recurrent major depressive disorder 1/17/2018    Nephropathy hypertensive, 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    Osteopenia of multiple sites 3/9/2018    Based upon bone density measurements. Patient also has chronic kidney disease.    Stroke 11/2016     Past Surgical History:   Procedure Laterality Date    ANGIOGRAM, CORONARY, WITH LEFT HEART CATHETERIZATION N/A 2/7/2022    Procedure: Angiogram, Coronary, with Left Heart Cath;  Surgeon: Gino Leal MD;  Location: [facility] CATH[facility]EP LAB;  Service: Cardiology;  Laterality: N/A;    CARDIAC SURGERY      stents    EYE SURGERY      WRIST SURGERY       Social History     Tobacco Use    Smoking status: Never    Smokeless tobacco: Never   Substance Use Topics    Alcohol use: No    Drug use: No        REVIEW OF SYSTEMS    As mentioned in HPI    OBJECTIVE     VITAL SIGNS (Most Recent)  Temp: 99 °F (37.2 °C) (07/01/24 1101)  Pulse: (!) 179 (07/01/24  1200)  Resp: 18 (07/01/24 1200)  BP: (!) 73/38 (07/01/24 1130)  SpO2: 97 % (07/01/24 1130)    VENTILATION STATUS  Resp: 18 (07/01/24 1200)  SpO2: 97 % (07/01/24 1130)           I & O (Last 24H):  Intake/Output Summary (Last 24 hours) at 7/1/2024 1324  Last data filed at 7/1/2024 1130  Gross per 24 hour   Intake 1335 ml   Output 180 ml   Net 1155 ml       WEIGHTS  Wt Readings from Last 3 Encounters:   06/28/24 0300 48.7 kg (107 lb 5.8 oz)   06/27/24 2343 47.6 kg (105 lb)   06/28/24 1440 48.7 kg (107 lb 5.8 oz)   06/06/24 0809 46.7 kg (103 lb)       PHYSICAL EXAM    CONSTITUTIONAL: NAD, frail chronically ill appearing elderly female  HEENT: Normocephalic. No pallor  NECK: no JVD  LUNGS: CTA b/l  HEART:  AFib RVR, S1, S2 normal, no murmur   ABDOMEN: sof  EXTREMITIES: No edema  SKIN: No rash  NEURO: AAO X 3  PSYCH: normal affect      HOME MEDICATIONS:  No current facility-administered medications on file prior to encounter.     Current Outpatient Medications on File Prior to Encounter   Medication Sig Dispense Refill    atorvastatin (LIPITOR) 40 MG tablet Take 40 mg by mouth once daily.      b complex vitamins tablet Take 1 tablet by mouth once daily.      calcium acetate,phosphat bind, (PHOSLO) 667 mg capsule Take 667 mg by mouth 3 (three) times daily with meals.      dorzolamide-timolol 2-0.5% (COSOPT) 22.3-6.8 mg/mL ophthalmic solution Place 1 drop into both eyes 2 (two) times daily.      dronedarone (MULTAQ) 400 mg Tab Take 1 tablet (400 mg total) by mouth 2 (two) times daily with meals. 180 tablet 0    ELIQUIS 2.5 mg Tab Take 2.5 mg by mouth 2 (two) times daily.      latanoprost 0.005 % ophthalmic solution Place 1 drop into both eyes every evening.      loperamide (IMODIUM A-D) 2 mg Tab Take 1 tablet (2 mg total) by mouth 4 (four) times daily as needed (diarrhea). 3 tablet 0    midodrine (PROAMATINE) 10 MG tablet Take 1 tablet (10 mg total) by mouth 3 (three) times daily with meals. 270 tablet 0    ondansetron  (ZOFRAN-ODT) 4 MG TbDL Take 1 tablet (4 mg total) by mouth every 6 (six) hours as needed (nausea). 30 tablet 5       SCHEDULED MEDS:   apixaban  2.5 mg Oral BID    aspirin  81 mg Oral Daily    atorvastatin  40 mg Oral QHS    dronedarone  400 mg Oral BID WM    isosorbide mononitrate  30 mg Oral Daily    metoprolol tartrate  12.5 mg Oral BID    mupirocin   Nasal BID       CONTINUOUS INFUSIONS:   amiodarone in dextrose 5%  1 mg/min Intravenous Continuous 33.3 mL/hr at 07/01/24 1237 1 mg/min at 07/01/24 1237    amiodarone in dextrose 5%  0.5 mg/min Intravenous Continuous        phenylephrine  0-5 mcg/kg/min Intravenous Continuous 21.9 mL/hr at 07/01/24 1258 0.6 mcg/kg/min at 07/01/24 1258       PRN MEDS:  Current Facility-Administered Medications:     0.9% NaCl, , Intravenous, PRN    0.9% NaCl, , Intravenous, PRN    acetaminophen, 650 mg, Oral, Q8H PRN    dextrose 50%, 12.5 g, Intravenous, PRN    dextrose 50%, 25 g, Intravenous, PRN    glucagon (human recombinant), 1 mg, Intramuscular, PRN    glucose, 16 g, Oral, PRN    glucose, 24 g, Oral, PRN    heparin (porcine), 5,000 Units, Intravenous, PRN    heparin (porcine), 5,000 Units, Intravenous, PRN    hydrALAZINE, 10 mg, Intravenous, Q8H PRN    magnesium oxide, 800 mg, Oral, PRN    magnesium oxide, 800 mg, Oral, PRN    melatonin, 6 mg, Oral, Nightly PRN    ondansetron, 8 mg, Intravenous, Q8H PRN    potassium bicarbonate, 35 mEq, Oral, PRN    potassium bicarbonate, 50 mEq, Oral, PRN    potassium bicarbonate, 60 mEq, Oral, PRN    potassium, sodium phosphates, 2 packet, Oral, PRN    potassium, sodium phosphates, 2 packet, Oral, PRN    potassium, sodium phosphates, 2 packet, Oral, PRN    promethazine (PHENERGAN) 12.5 mg in 0.9% NaCl 50 mL IVPB, 12.5 mg, Intravenous, Q6H PRN    sodium chloride 0.9%, 250 mL, Intravenous, PRN    sodium chloride 0.9%, 250 mL, Intravenous, PRN    sodium chloride 0.9%, 10 mL, Intravenous, PRN    LABS AND DIAGNOSTICS     CBC LAST 3 DAYS  Recent Labs  "  Lab 06/29/24  0525 06/30/24  0328 07/01/24  0235   WBC 9.18 7.44 5.34   RBC 5.43* 4.70 4.34   HGB 14.2 12.4 11.3*   HCT 48.9* 42.8 38.0   MCV 90 91 88   MCH 26.2* 26.4* 26.0*   MCHC 29.0* 29.0* 29.7*   RDW 18.0* 17.1* 16.6*   * 156 158   MPV 9.9 10.4 10.1   GRAN 74.4*  6.8 62.8  4.7 48.3  2.6   LYMPH 11.4*  1.1 18.8  1.4 20.2  1.1   MONO 13.0  1.2* 12.1  0.9 15.9*  0.9   BASO 0.03 0.03 0.03   NRBC 0 0 0       COAGULATION LAST 3 DAYS  No results for input(s): "LABPT", "INR", "APTT" in the last 168 hours.    CHEMISTRY LAST 3 DAYS  Recent Labs   Lab 06/28/24  0018 06/28/24  0552 06/28/24  1701 06/30/24  0328 06/30/24  1321 07/01/24  0235      < > 138 137 136 136   K 6.5*   < > 4.6 5.9* 5.2* 4.6   CL 98   < > 95 102 100 98   CO2 30*   < > 30* 25 24 25   ANIONGAP 12   < > 13 10 12 13   BUN 56*   < > 38* 38* 42* 53*   CREATININE 9.2*   < > 7.7* 7.5* 8.1* 9.2*   GLU 90   < > 120* 54* 106 80   CALCIUM 9.6   < > 9.5 8.9 8.9 8.0*   MG 2.3  --   --   --   --   --    ALBUMIN 4.3   < > 4.4 3.7 3.6 3.3*   PROT 7.6   < > 8.0 6.9  --  6.2   ALKPHOS 64   < > 74 59  --  52*   ALT 16   < > 17 12  --  13   AST 21   < > 25 16  --  18   BILITOT 0.4   < > 0.6 0.5  --  0.4    < > = values in this interval not displayed.       CARDIAC PROFILE LAST 3 DAYS  Recent Labs   Lab 06/28/24  0018 06/28/24  0553 06/28/24  1349 06/28/24  1701   *  --   --   --    TROPONINIHS 39.6* 61.9* 52.2* 56.7*       ENDOCRINE LAST 3 DAYS  Recent Labs   Lab 06/28/24  0553   TSH 1.151       LAST ARTERIAL BLOOD GAS  ABG  No results for input(s): "PH", "PO2", "PCO2", "HCO3", "BE" in the last 168 hours.    LAST 7 DAYS MICROBIOLOGY   Microbiology Results (last 7 days)       ** No results found for the last 168 hours. **            MOST RECENT IMAGING  Nuclear Stress Test    The ECG portion of the study is negative for ischemia.    The patient reported no chest pain during the stress test.    There were no arrhythmias during stress.    The " nuclear portion of this study will be reported separately.  NM Myocardial Perfusion Spect Multi Pharmacologic  Narrative: EXAMINATION:  NM MYOCARDIAL PERFUSION SPECT MULTI PHARM    CLINICAL HISTORY:  CAD screening, high CAD risk, not treadmill candidate;    TECHNIQUE:  11.5 mCi technetium 99m tetrofosmin was administered IV for the rest portion of the exam, with 27 mCi for the stress portion of the exam, following a 1 day protocol. The patient was stressed with regadenoson 0.4 mg IV.    FINDINGS:  Comparison the prior exam of 03/21/2023. There is normal and homogeneous radiotracer uptake by the left ventricular myocardium, with no photopenic defects to suggest pharmacologically induced reversible ischemia or infarct. There are no wall motion abnormalities on the gated cine images, with no abnormal transient left ventricular dilatation on stress images.    Ejection fraction is calculated at 90 %. The polar map images confirm the planar SPECT findings.  Impression: 1.   No evidence of pharmacologically induced reversible ischemia or infarct.    2.   Normal left ventricular wall motion.    3.   Calculated ejection fraction of 90 %.    Electronically signed by: Aleksey Atkins  Date:    06/29/2024  Time:    11:01      ECHOCARDIOGRAM RESULTS (last 5)  Results for orders placed during the hospital encounter of 01/04/24    Echo    Interpretation Summary    Left Ventricle: The left ventricle is normal in size. Normal wall thickness. Normal wall motion. There is normal systolic function with a visually estimated ejection fraction of 60 - 65%. There is diastolic dysfunction.    Right Ventricle: Normal right ventricular cavity size. Wall thickness is normal. Right ventricle wall motion  is normal. Systolic function is normal.    Left Atrium: Left atrium is severely dilated.    Mitral Valve: There is moderate bileaflet sclerosis. There is moderate mitral annular calcification present. There is mild to moderate regurgitation with a  centrally directed jet.    Tricuspid Valve: There is mild to moderate regurgitation.    Pulmonary Artery: There is mild pulmonary hypertension. The estimated pulmonary artery systolic pressure is 41 mmHg.    IVC/SVC: Normal venous pressure at 3 mmHg.      Results for orders placed during the hospital encounter of 03/20/23    Echo    Interpretation Summary  · The left ventricle is normal in size with moderate concentric hypertrophy and normal systolic function.  · The estimated ejection fraction is 57%.  · Normal left ventricular diastolic function.  · Normal right ventricular size with normal right ventricular systolic function.  · Moderate to severe tricuspid regurgitation.  · Mild-to-moderate mitral regurgitation.  · Moderate left atrial enlargement.  · Mild right atrial enlargement.  · There is mild pulmonary hypertension.      Results for orders placed during the hospital encounter of 03/06/23    Echo    Interpretation Summary  · The left ventricle is normal in size with normal systolic function.  · The estimated ejection fraction is 60%.  · Grade II left ventricular diastolic dysfunction.  · Normal right ventricular size with normal right ventricular systolic function.  · Moderate left atrial enlargement.  · Mild-to-moderate mitral regurgitation.  · Moderate to severe tricuspid regurgitation.  · There is mild pulmonary hypertension.  · There is mild aortic valve stenosis.  · Aortic valve area is 1.78 cm2; peak velocity is 2.01 m/s; mean gradient is 9 mmHg.      Results for orders placed during the hospital encounter of 01/26/22    Echo    Interpretation Summary  · The left ventricle is normal in size with mild concentric hypertrophy and normal systolic function.  · The estimated ejection fraction is 65%.  · Grade II left ventricular diastolic dysfunction.  · Atrial fibrillation not observed.  · Normal right ventricular size with normal right ventricular systolic function.  · Moderate to severe left atrial  enlargement.  · Mild right atrial enlargement.  · Mild mitral regurgitation.  · Mild to moderate tricuspid regurgitation.  · Normal central venous pressure (3 mmHg).  · The estimated PA systolic pressure is 36 mmHg.  · Mildly elevated gradient accross mitral valve of around 6 mmHg at HR of 64 bpm. MVA by pressure half time is normal, however this can be inaccurate.      Results for orders placed during the hospital encounter of 03/03/21    Echo Color Flow Doppler? Yes    Interpretation Summary  · The left ventricle is small with concentric remodeling and normal systolic function. The estimated ejection fraction is 63%  · The estimated PA systolic pressure is 38 mmHg.  · Grade II left ventricular diastolic dysfunction.  · Normal right ventricular size with normal right ventricular systolic function.  · Mild-to-moderate mitral regurgitation.  · Mild to moderate tricuspid regurgitation.  · Normal central venous pressure (3 mmHg).  · The patient converted from atrial fibrillation to normal sinus rhythm 03/03/2021      CURRENT/PREVIOUS VISIT EKG  Results for orders placed or performed during the hospital encounter of 06/27/24   EKG 12-lead    Collection Time: 06/29/24  2:38 PM   Result Value Ref Range    QRS Duration 80 ms    OHS QTC Calculation 442 ms    Narrative    Test Reason : R00.0,    Vent. Rate : 177 BPM     Atrial Rate : 177 BPM     P-R Int : 120 ms          QRS Dur : 080 ms      QT Int : 258 ms       P-R-T Axes : 057 -42 231 degrees     QTc Int : 442 ms    Sinus tachycardia  Left axis deviation  Marked ST abnormality, possible inferior subendocardial injury  Marked ST abnormality, possible anterolateral subendocardial injury  Abnormal ECG  When compared with ECG of 29-JUN-2024 13:34,  Significant changes have occurred    Referred By: [email or web address]   SELF           Confirmed By:            ASSESSMENT/PLAN:     Active Hospital Problems    Diagnosis    *Chest pain    Hyperkalemia    ESRD (end stage renal disease)     Atrial fibrillation    Syncope and collapse       ASSESSMENT & PLAN:     Chest Pain  Hyperkalemia  Elevated troponin HS- mild  ESRD on HD  PAF  SVT    RECOMMENDATIONS:    Bolused with 150 mg amiodarone this morning; for now, continue amiodarone drip 1 milligram/minute for 6 hours then reduced to 0.5 milligrams/minute.   DC Multaq.   Continue Lopressor 12.5 mg BID.   Continue Eliquis 2.5 mg p.o. b.i.d. for anticoagulation.  Isaak drip started for hypotension, titrate as tolerated per order set.  We will continue to follow at this time.       Lizette Villegas NP  Department of Cardiology  Date of Service: 07/01/2024

## 2024-07-01 NOTE — ASSESSMENT & PLAN NOTE
Creatine stable for now. BMP reviewed- noted Estimated Creatinine Clearance: 3.5 mL/min (A) (based on SCr of 9.2 mg/dL (H)). according to latest data. Based on current GFR, CKD stage is end stage.  Monitor UOP and serial BMP and adjust therapy as needed. Renally dose meds. Avoid nephrotoxic medications and procedures.  Nephrology follow up for HD

## 2024-07-01 NOTE — ASSESSMENT & PLAN NOTE
I reviewed the patient's chart and discussed the case with the patient's team.      I examined Tyra Isaac at bedside.  The patient maintains capacity for complex medical decision-making.  I spoke with pt at bedside.    I introduced myself and my role as palliative care NP. She was agreeable to speaking.    We discussed the patient's medical illness, prognosis, and values in detail.  Below is a brief summary of our discussion.     Pt is  and lives with her son and daughter in law. She is retired and has been on HD for the past 10 yrs. She states dialysis consumes most of her time but when she is not at dialysis she enjoys being home and spending time with family. She is wheelchair bound and requires mod asst with ADLs. She is [employer] and finds great strength in her abril.     She understands she is hospitalized for chest pain and heart issues. She has experienced cardiac issues during her HD over the past few days. She has a very good understanding of her medical conditions.    We talked about prognosis. I let her know that her medical team and myself are worried about her and that she is high risk for further cardiac complications. She verbalized understanding and voiced concern as well. I told her that I would not be surprised if she experienced further complications over the coming weeks and months that could result in death. She did not seem surprised by this news.     We discussed code status. I educated pt on the difference between full code and DNR. She wishes to remain full code for now. Our staff provided pt with advanced care planning information and she would like to review it with her son.     We discussed medical POA and pt states she would want to appoint her son Raffi. Our staff offered to complete this paperwork with her and she declined and stated she would rather wait and do it when he is at the hosptial.     For now pt is willing to cont with HD, invasive procedures and treatment.  We did not discuss hospice as it is clear pts goals are not aligned with hospice philosophy.     I appreciate being involved in pts care. I will cont to follow along. Please reach out if I can be of any assistance.

## 2024-07-02 LAB
ALBUMIN SERPL BCP-MCNC: 3.1 G/DL (ref 3.5–5.2)
ALP SERPL-CCNC: 47 U/L (ref 55–135)
ALT SERPL W/O P-5'-P-CCNC: 12 U/L (ref 10–44)
ANION GAP SERPL CALC-SCNC: 12 MMOL/L (ref 8–16)
AST SERPL-CCNC: 18 U/L (ref 10–40)
BASOPHILS # BLD AUTO: 0.01 K/UL (ref 0–0.2)
BASOPHILS NFR BLD: 0.2 % (ref 0–1.9)
BILIRUB SERPL-MCNC: 0.4 MG/DL (ref 0.1–1)
BUN SERPL-MCNC: 34 MG/DL (ref 8–23)
CALCIUM SERPL-MCNC: 7.8 MG/DL (ref 8.7–10.5)
CHLORIDE SERPL-SCNC: 99 MMOL/L (ref 95–110)
CO2 SERPL-SCNC: 25 MMOL/L (ref 23–29)
CREAT SERPL-MCNC: 6.5 MG/DL (ref 0.5–1.4)
DIFFERENTIAL METHOD BLD: ABNORMAL
EOSINOPHIL # BLD AUTO: 0.8 K/UL (ref 0–0.5)
EOSINOPHIL NFR BLD: 17.1 % (ref 0–8)
ERYTHROCYTE [DISTWIDTH] IN BLOOD BY AUTOMATED COUNT: 16.4 % (ref 11.5–14.5)
EST. GFR  (NO RACE VARIABLE): 5.9 ML/MIN/1.73 M^2
GLUCOSE SERPL-MCNC: 64 MG/DL (ref 70–110)
HCT VFR BLD AUTO: 35.3 % (ref 37–48.5)
HGB BLD-MCNC: 10.5 G/DL (ref 12–16)
IMM GRANULOCYTES # BLD AUTO: 0.01 K/UL (ref 0–0.04)
IMM GRANULOCYTES NFR BLD AUTO: 0.2 % (ref 0–0.5)
LYMPHOCYTES # BLD AUTO: 1.2 K/UL (ref 1–4.8)
LYMPHOCYTES NFR BLD: 28.2 % (ref 18–48)
MCH RBC QN AUTO: 26.2 PG (ref 27–31)
MCHC RBC AUTO-ENTMCNC: 29.7 G/DL (ref 32–36)
MCV RBC AUTO: 88 FL (ref 82–98)
MONOCYTES # BLD AUTO: 0.6 K/UL (ref 0.3–1)
MONOCYTES NFR BLD: 13.2 % (ref 4–15)
NEUTROPHILS # BLD AUTO: 1.8 K/UL (ref 1.8–7.7)
NEUTROPHILS NFR BLD: 41.1 % (ref 38–73)
NRBC BLD-RTO: 1 /100 WBC
PLATELET # BLD AUTO: 150 K/UL (ref 150–450)
PMV BLD AUTO: 10.6 FL (ref 9.2–12.9)
POTASSIUM SERPL-SCNC: 4.2 MMOL/L (ref 3.5–5.1)
PROT SERPL-MCNC: 5.8 G/DL (ref 6–8.4)
RBC # BLD AUTO: 4.01 M/UL (ref 4–5.4)
SODIUM SERPL-SCNC: 136 MMOL/L (ref 136–145)
WBC # BLD AUTO: 4.39 K/UL (ref 3.9–12.7)

## 2024-07-02 PROCEDURE — 63600175 PHARM REV CODE 636 W HCPCS: Performed by: STUDENT IN AN ORGANIZED HEALTH CARE EDUCATION/TRAINING PROGRAM

## 2024-07-02 PROCEDURE — 25000003 PHARM REV CODE 250: Performed by: INTERNAL MEDICINE

## 2024-07-02 PROCEDURE — 97161 PT EVAL LOW COMPLEX 20 MIN: CPT

## 2024-07-02 PROCEDURE — 25000003 PHARM REV CODE 250

## 2024-07-02 PROCEDURE — 99233 SBSQ HOSP IP/OBS HIGH 50: CPT | Mod: ,,, | Performed by: INTERNAL MEDICINE

## 2024-07-02 PROCEDURE — 80053 COMPREHEN METABOLIC PANEL: CPT | Performed by: STUDENT IN AN ORGANIZED HEALTH CARE EDUCATION/TRAINING PROGRAM

## 2024-07-02 PROCEDURE — 85025 COMPLETE CBC W/AUTO DIFF WBC: CPT | Performed by: STUDENT IN AN ORGANIZED HEALTH CARE EDUCATION/TRAINING PROGRAM

## 2024-07-02 PROCEDURE — 25000003 PHARM REV CODE 250: Performed by: STUDENT IN AN ORGANIZED HEALTH CARE EDUCATION/TRAINING PROGRAM

## 2024-07-02 PROCEDURE — 97165 OT EVAL LOW COMPLEX 30 MIN: CPT

## 2024-07-02 PROCEDURE — 97535 SELF CARE MNGMENT TRAINING: CPT

## 2024-07-02 PROCEDURE — 94761 N-INVAS EAR/PLS OXIMETRY MLT: CPT

## 2024-07-02 PROCEDURE — 99900031 HC PATIENT EDUCATION (STAT)

## 2024-07-02 PROCEDURE — 36415 COLL VENOUS BLD VENIPUNCTURE: CPT | Performed by: STUDENT IN AN ORGANIZED HEALTH CARE EDUCATION/TRAINING PROGRAM

## 2024-07-02 PROCEDURE — 20000000 HC ICU ROOM

## 2024-07-02 PROCEDURE — 97116 GAIT TRAINING THERAPY: CPT

## 2024-07-02 RX ORDER — GUAIFENESIN AND DEXTROMETHORPHAN HYDROBROMIDE 10; 100 MG/5ML; MG/5ML
10 SYRUP ORAL EVERY 4 HOURS PRN
Status: DISCONTINUED | OUTPATIENT
Start: 2024-07-02 | End: 2024-07-06 | Stop reason: HOSPADM

## 2024-07-02 RX ORDER — LATANOPROST 50 UG/ML
1 SOLUTION/ DROPS OPHTHALMIC NIGHTLY
Status: DISCONTINUED | OUTPATIENT
Start: 2024-07-02 | End: 2024-07-06 | Stop reason: HOSPADM

## 2024-07-02 RX ORDER — AMIODARONE HYDROCHLORIDE 200 MG/1
200 TABLET ORAL 3 TIMES DAILY
Status: DISCONTINUED | OUTPATIENT
Start: 2024-07-02 | End: 2024-07-06 | Stop reason: HOSPADM

## 2024-07-02 RX ORDER — DORZOLAMIDE HYDROCHLORIDE AND TIMOLOL MALEATE 20; 5 MG/ML; MG/ML
1 SOLUTION/ DROPS OPHTHALMIC 2 TIMES DAILY
Status: DISCONTINUED | OUTPATIENT
Start: 2024-07-02 | End: 2024-07-06 | Stop reason: HOSPADM

## 2024-07-02 RX ADMIN — AMIODARONE HYDROCHLORIDE 0.5 MG/MIN: 1.8 INJECTION, SOLUTION INTRAVENOUS at 02:07

## 2024-07-02 RX ADMIN — LATANOPROST 1 DROP: 50 SOLUTION/ DROPS OPHTHALMIC at 09:07

## 2024-07-02 RX ADMIN — AMIODARONE HYDROCHLORIDE 200 MG: 200 TABLET ORAL at 09:07

## 2024-07-02 RX ADMIN — ISOSORBIDE MONONITRATE 30 MG: 30 TABLET, EXTENDED RELEASE ORAL at 08:07

## 2024-07-02 RX ADMIN — APIXABAN 2.5 MG: 2.5 TABLET, FILM COATED ORAL at 08:07

## 2024-07-02 RX ADMIN — AMIODARONE HYDROCHLORIDE 200 MG: 200 TABLET ORAL at 02:07

## 2024-07-02 RX ADMIN — METOPROLOL TARTRATE 12.5 MG: 25 TABLET, FILM COATED ORAL at 09:07

## 2024-07-02 RX ADMIN — DORZOLAMIDE HYDROCHLORIDE AND TIMOLOL MALEATE 1 DROP: 22.3; 6.8 SOLUTION/ DROPS OPHTHALMIC at 09:07

## 2024-07-02 RX ADMIN — ASPIRIN 81 MG: 81 TABLET, COATED ORAL at 08:07

## 2024-07-02 RX ADMIN — ACETAMINOPHEN 650 MG: 325 TABLET ORAL at 11:07

## 2024-07-02 RX ADMIN — GUAIFENESIN AND DEXTROMETHORPHAN 10 ML: 100; 10 SYRUP ORAL at 09:07

## 2024-07-02 RX ADMIN — MUPIROCIN 1 G: 20 OINTMENT TOPICAL at 09:07

## 2024-07-02 RX ADMIN — MUPIROCIN 1 G: 20 OINTMENT TOPICAL at 08:07

## 2024-07-02 RX ADMIN — METOPROLOL TARTRATE 12.5 MG: 25 TABLET, FILM COATED ORAL at 08:07

## 2024-07-02 RX ADMIN — APIXABAN 2.5 MG: 2.5 TABLET, FILM COATED ORAL at 09:07

## 2024-07-02 NOTE — SUBJECTIVE & OBJECTIVE
Interval History: Patient alert and oriented, HR and BP controlled this morning. Denies any chest pain or SOB. States dialysis yesterday was aborted due to hemodynamic instability. Patient states that she is considering becoming DNR, but wants to talk with family.     Review of Systems  Objective:     Vital Signs (Most Recent):  Temp: 98.8 °F (37.1 °C) (07/02/24 1101)  Pulse: 67 (07/02/24 1145)  Resp: 20 (07/02/24 1145)  BP: (!) 158/72 (07/02/24 1130)  SpO2: 98 % (07/02/24 1145) Vital Signs (24h Range):  Temp:  [97.6 °F (36.4 °C)-98.8 °F (37.1 °C)] 98.8 °F (37.1 °C)  Pulse:  [] 67  Resp:  [16-41] 20  SpO2:  [80 %-100 %] 98 %  BP: ()/(43-76) 158/72     Weight: 48.7 kg (107 lb 5.8 oz)  Body mass index is 17.87 kg/m².    Intake/Output Summary (Last 24 hours) at 7/2/2024 1202  Last data filed at 7/2/2024 1000  Gross per 24 hour   Intake 1730.1 ml   Output 0 ml   Net 1730.1 ml         Physical Exam  Vitals and nursing note reviewed.   HENT:      Head: Normocephalic and atraumatic.   Eyes:      Conjunctiva/sclera: Conjunctivae normal.   Neck:      Vascular: No JVD.   Cardiovascular:      Rate and Rhythm: irregular, rate controlled.      Heart sounds: Normal heart sounds.   Pulmonary:      Effort: Pulmonary effort is normal.      Breath sounds: Normal breath sounds.   Abdominal:      Palpations: Abdomen is soft.   Musculoskeletal:         General: Normal range of motion.   Skin:     General: Skin is warm.   Neurological:      Mental Status: She is alert and oriented to person, place, and time.   Psychiatric:         Mood and Affect: Mood normal.         Significant Labs: All pertinent labs within the past 24 hours have been reviewed.  CBC:   Recent Labs   Lab 07/01/24  0235 07/02/24  0324   WBC 5.34 4.39   HGB 11.3* 10.5*   HCT 38.0 35.3*    150     CMP:   Recent Labs   Lab 06/30/24  1321 07/01/24  0235 07/02/24  0324    136 136   K 5.2* 4.6 4.2    98 99   CO2 24 25 25    80 64*   BUN  42* 53* 34*   CREATININE 8.1* 9.2* 6.5*   CALCIUM 8.9 8.0* 7.8*   PROT  --  6.2 5.8*   ALBUMIN 3.6 3.3* 3.1*   BILITOT  --  0.4 0.4   ALKPHOS  --  52* 47*   AST  --  18 18   ALT  --  13 12   ANIONGAP 12 13 12       Significant Imaging: I have reviewed all pertinent imaging results/findings within the past 24 hours.

## 2024-07-02 NOTE — SUBJECTIVE & OBJECTIVE
Interval History: Pt clinically improved today. She reports feeling weak and fatigued today.     Medications:  Continuous Infusions:   phenylephrine  0-5 mcg/kg/min Intravenous Continuous   Stopped at 07/01/24 1545     Scheduled Meds:   amiodarone  200 mg Oral TID    apixaban  2.5 mg Oral BID    metoprolol tartrate  12.5 mg Oral BID    mupirocin   Nasal BID     PRN Meds:  Current Facility-Administered Medications:     0.9% NaCl, , Intravenous, PRN    0.9% NaCl, , Intravenous, PRN    acetaminophen, 650 mg, Oral, Q8H PRN    dextrose 50%, 12.5 g, Intravenous, PRN    dextrose 50%, 25 g, Intravenous, PRN    glucagon (human recombinant), 1 mg, Intramuscular, PRN    glucose, 16 g, Oral, PRN    glucose, 24 g, Oral, PRN    heparin (porcine), 5,000 Units, Intravenous, PRN    heparin (porcine), 5,000 Units, Intravenous, PRN    hydrALAZINE, 10 mg, Intravenous, Q8H PRN    magnesium oxide, 800 mg, Oral, PRN    magnesium oxide, 800 mg, Oral, PRN    melatonin, 6 mg, Oral, Nightly PRN    ondansetron, 8 mg, Intravenous, Q8H PRN    potassium bicarbonate, 35 mEq, Oral, PRN    potassium bicarbonate, 50 mEq, Oral, PRN    potassium bicarbonate, 60 mEq, Oral, PRN    potassium, sodium phosphates, 2 packet, Oral, PRN    potassium, sodium phosphates, 2 packet, Oral, PRN    potassium, sodium phosphates, 2 packet, Oral, PRN    promethazine (PHENERGAN) 12.5 mg in 0.9% NaCl 50 mL IVPB, 12.5 mg, Intravenous, Q6H PRN    sodium chloride 0.9%, 250 mL, Intravenous, PRN    sodium chloride 0.9%, 250 mL, Intravenous, PRN    sodium chloride 0.9%, 10 mL, Intravenous, PRN    Objective:     Vital Signs (Most Recent):  Temp: 98.8 °F (37.1 °C) (07/02/24 1101)  Pulse: 67 (07/02/24 1145)  Resp: 20 (07/02/24 1145)  BP: (!) 158/72 (07/02/24 1130)  SpO2: 98 % (07/02/24 1145) Vital Signs (24h Range):  Temp:  [97.6 °F (36.4 °C)-98.8 °F (37.1 °C)] 98.8 °F (37.1 °C)  Pulse:  [56-82] 67  Resp:  [16-41] 20  SpO2:  [94 %-100 %] 98 %  BP: (100-169)/(55-76) 158/72      Weight: 48.7 kg (107 lb 5.8 oz)  Body mass index is 17.87 kg/m².       Physical Exam  Vitals and nursing note reviewed.   Constitutional:       General: She is not in acute distress.     Appearance: Normal appearance.   HENT:      Mouth/Throat:      Mouth: Mucous membranes are moist.   Eyes:      General: No scleral icterus.     Pupils: Pupils are equal, round, and reactive to light.   Cardiovascular:      Rate and Rhythm: Normal rate and regular rhythm.      Pulses: Normal pulses.   Pulmonary:      Effort: Pulmonary effort is normal. No respiratory distress.   Skin:     General: Skin is warm and dry.   Neurological:      General: No focal deficit present.      Mental Status: She is alert and oriented to person, place, and time.   Psychiatric:         Mood and Affect: Mood normal.         Behavior: Behavior normal.         Thought Content: Thought content normal.         Judgment: Judgment normal.            Review of Symptoms      Symptom Assessment (ESAS 0-10 Scale)  Pain:  0  Dyspnea:  0  Anxiety:  0  Nausea:  0  Depression:  0  Anorexia:  0  Fatigue:  7  Insomnia:  0  Restlessness:  0  Agitation:  0         Living Arrangements:  Lives with family    Psychosocial/Cultural:   See Palliative Psychosocial Note: No  **Primary  to Follow**  Palliative Care  Consult: No        Advance Care Planning   Advance Directives:   Do Not Resuscitate Status: No      Decision Making:  Patient answered questions  Goals of Care: What is most important right now is to focus on quality of life, even if it means sacrificing a little time, curative/life-prolongation (regardless of treatment burdens). Accordingly, we have decided that the best plan to meet the patient's goals includes continuing with treatment.         Significant Labs: All pertinent labs within the past 24 hours have been reviewed.  CBC:   Recent Labs   Lab 07/02/24  0324   WBC 4.39   HGB 10.5*   HCT 35.3*   MCV 88         BMP:  Recent Labs   Lab 07/02/24  0324   GLU 64*      K 4.2   CL 99   CO2 25   BUN 34*   CREATININE 6.5*   CALCIUM 7.8*     LFT:  Lab Results   Component Value Date    AST 18 07/02/2024    ALKPHOS 47 (L) 07/02/2024    BILITOT 0.4 07/02/2024     Albumin:   Albumin   Date Value Ref Range Status   07/02/2024 3.1 (L) 3.5 - 5.2 g/dL Final     Protein:   Total Protein   Date Value Ref Range Status   07/02/2024 5.8 (L) 6.0 - 8.4 g/dL Final     Lactic acid:   Lab Results   Component Value Date    LACTATE 0.7 01/04/2024    LACTATE 2.2 (HH) 01/04/2024       Significant Imaging: I have reviewed all pertinent imaging results/findings within the past 24 hours.

## 2024-07-02 NOTE — CARE UPDATE
07/02/24 0805   Patient Assessment/Suction   Level of Consciousness (AVPU) alert   Respiratory Effort Normal;Unlabored   Expansion/Accessory Muscles/Retractions no use of accessory muscles;no retractions;expansion symmetric   Rhythm/Pattern, Respiratory unlabored;depth regular;no shortness of breath reported   PRE-TX-O2   Device (Oxygen Therapy) room air   SpO2 99 %   Pulse Oximetry Type Continuous   $ Pulse Oximetry - Multiple Charge Pulse Oximetry - Multiple   Pulse 66   Resp 14

## 2024-07-02 NOTE — PROGRESS NOTES
[facility]  Department of Cardiology  Progress Note      PATIENT NAME: Tyra Isaac  MRN: [id number]  TODAY'S DATE: 07/02/2024  ADMIT DATE: 6/27/2024                          CONSULT REQUESTED BY: Khadra Bowens MD    SUBJECTIVE     PRINCIPAL PROBLEM: Chest pain    07/02/2024  Pt seen sleeping in ICU this morning. She converted back to NSR on amio gtt. Amio gtt turned off by nursing staff due to bradycardia early this morning - will convert to PO today. BP improved - pressor weaned off.     07/01/2024  RN called this morning to report AFib RVR during dialysis.  Upon arrival to the bedside, patient was noted to be in AFib RVR with a rate in the 180s, BP 70s over 30s.  250 of digoxin was pushed per verbal MD order.  Zoll was then connected to the patient.  Three attempted cardioversions - failed.  Patient was then bolused with 500 cc of LR, bolused with amiodarone, and started on a peter drip for hypotension.    06/30/2024  Pt seen this morning resting comfortably in ICU. Remains in NSR with rate in 80s. She states she is feeling well today however is concerned about costs of medications.     6/29/2024  Patient seen today in stress lab and then later in the dialysis.  Patient had been tolerating dialysis well but then developed an elevated heart rate in the 160s which quickly settled down to the 110s after dialysis was stopped.  Patient reports having chest pain only whenever she has dialysis and does not have any with exertion.  Her nuclear stress test is negative for reversible ischemia.    REASON FOR CONSULT:  Chest pain, elevated troponin      HPI:    Patient is a 84-year-old female with history of atrial fibrillation on Eliquis, ESRD on dialysis Monday Wednesday Friday, CVA presents to emergency room with complaints of chest tightness x2 hours prior to arrival.  Chest pain resolved in ED but reports associated shortness of breath, nausea, vomiting, and dizziness when CP was occuring.   Hyperkalemia in ED with K of 6.5.  Nephrology consulted, stat dialysis performed.     Trivially elevated troponin HS.  39 > 61.  Chronically elevated troponin HS likely due to ESRD on HD. No acute ST-T wave changes.      During examination, patient is resting in bed.  Dialysis performed. Hypertensive this afternoon. SR on tele.  C/o HA.  CP improved.      ECHO 1/5/24    Left Ventricle: The left ventricle is normal in size. Normal wall thickness. Normal wall motion. There is normal systolic function with a visually estimated ejection fraction of 60 - 65%. There is diastolic dysfunction.    Right Ventricle: Normal right ventricular cavity size. Wall thickness is normal. Right ventricle wall motion  is normal. Systolic function is normal.    Left Atrium: Left atrium is severely dilated.    Mitral Valve: There is moderate bileaflet sclerosis. There is moderate mitral annular calcification present. There is mild to moderate regurgitation with a centrally directed jet.    Tricuspid Valve: There is mild to moderate regurgitation.    Pulmonary Artery: There is mild pulmonary hypertension. The estimated pulmonary artery systolic pressure is 41 mmHg.    IVC/SVC: Normal venous pressure at 3 mmHg.    CATH 2/7/22  The left ventricular systolic function was normal.  The left ventricular end diastolic pressure was elevated.  The pre-procedure left ventricular end diastolic pressure was 20.  The post-procedure left ventricular end diastolic pressure was 31.  The ejection fraction was calculated to be 60%.  The estimated blood loss was none.  Left main is patent, lad patent moderate to severe tortuosity.  Left circumflex artery proximal stent is patent moderate to severe tortuosity, ramus intermedius is patent.  RCA is small caliber is patent moderate tortuosity. Non dominant vessel.        From H&P   Chest Pain       Pt says she has been having pain in the center of her chest for the last two hours.  Pt sad she threw up prior to  the pain.  Pt has hx of afib         HPI: Patient is a 84-year-old female with history of atrial fibrillation on Eliquis, ESRD on dialysis Monday Wednesday Friday, CVA presents to emergency room with complaints of chest discomfort.  Patient reports chest discomfort started earlier today lasted about 2 hours, describes it as tightness radiating from the right to left side.  Resolved prior to coming to the emergency room.  Patient reports similar symptoms in the past.  Did have episode of emesis.  Potassium 6.5 on admission, ED physician spoke with Nephrology who ordered stat dialysis, ED physician placed hyperkalemia protocol.  Troponin 39.6.  Patient being admitted for chest pain workup.      Review of patient's allergies indicates:   Allergen Reactions    Cyclobenzaprine     Fish containing products Hives    Peanut Other (See Comments)    Tramadol Itching       Past Medical History:   Diagnosis Date    A-fib     Anxiety     Depression     Disorder of kidney and ureter     Encephalopathy acute 1/1/2018    End stage kidney disease 6/17/2017    Gout     Hyperlipidemia     Hypertension     Moderate episode of recurrent major depressive disorder 1/17/2018    Nephropathy hypertensive, 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    Osteopenia of multiple sites 3/9/2018    Based upon bone density measurements. Patient also has chronic kidney disease.    Stroke 11/2016     Past Surgical History:   Procedure Laterality Date    ANGIOGRAM, CORONARY, WITH LEFT HEART CATHETERIZATION N/A 2/7/2022    Procedure: Angiogram, Coronary, with Left Heart Cath;  Surgeon: Gino Leal MD;  Location: [facility] CATH/EP LAB;  Service: Cardiology;  Laterality: N/A;    CARDIAC SURGERY      stents    EYE SURGERY      WRIST SURGERY       Social History     Tobacco Use    Smoking status: Never    Smokeless tobacco: Never    Substance Use Topics    Alcohol use: No    Drug use: No        REVIEW OF SYSTEMS    As mentioned in HPI    OBJECTIVE     VITAL SIGNS (Most Recent)  Temp: 98.8 °F (37.1 °C) (07/02/24 1101)  Pulse: 61 (07/02/24 1101)  Resp: (!) 28 (07/02/24 1101)  BP: (!) 149/68 (07/02/24 1101)  SpO2: 99 % (07/02/24 1101)    VENTILATION STATUS  Resp: (!) 28 (07/02/24 1101)  SpO2: 99 % (07/02/24 1101)           I & O (Last 24H):  Intake/Output Summary (Last 24 hours) at 7/2/2024 1132  Last data filed at 7/2/2024 1000  Gross per 24 hour   Intake 1735.1 ml   Output 0 ml   Net 1735.1 ml       WEIGHTS  Wt Readings from Last 3 Encounters:   06/28/24 0300 48.7 kg (107 lb 5.8 oz)   06/27/24 2343 47.6 kg (105 lb)   06/28/24 1440 48.7 kg (107 lb 5.8 oz)   06/06/24 0809 46.7 kg (103 lb)       PHYSICAL EXAM    CONSTITUTIONAL: NAD, frail chronically ill appearing elderly female  HEENT: Normocephalic. No pallor  NECK: no JVD  LUNGS: CTA b/l  HEART:  NSR, S1, S2 normal, no murmur   ABDOMEN: sof  EXTREMITIES: No edema  SKIN: No rash  NEURO: AAO X 3  PSYCH: normal affect      HOME MEDICATIONS:  No current facility-administered medications on file prior to encounter.     Current Outpatient Medications on File Prior to Encounter   Medication Sig Dispense Refill    atorvastatin (LIPITOR) 40 MG tablet Take 40 mg by mouth once daily.      b complex vitamins tablet Take 1 tablet by mouth once daily.      calcium acetate,phosphat bind, (PHOSLO) 667 mg capsule Take 667 mg by mouth 3 (three) times daily with meals.      dorzolamide-timolol 2-0.5% (COSOPT) 22.3-6.8 mg/mL ophthalmic solution Place 1 drop into both eyes 2 (two) times daily.      dronedarone (MULTAQ) 400 mg Tab Take 1 tablet (400 mg total) by mouth 2 (two) times daily with meals. 180 tablet 0    ELIQUIS 2.5 mg Tab Take 2.5 mg by mouth 2 (two) times daily.      latanoprost 0.005 % ophthalmic solution Place 1 drop into both eyes every evening.      loperamide (IMODIUM A-D) 2 mg Tab Take 1 tablet  (2 mg total) by mouth 4 (four) times daily as needed (diarrhea). 3 tablet 0    midodrine (PROAMATINE) 10 MG tablet Take 1 tablet (10 mg total) by mouth 3 (three) times daily with meals. 270 tablet 0    ondansetron (ZOFRAN-ODT) 4 MG TbDL Take 1 tablet (4 mg total) by mouth every 6 (six) hours as needed (nausea). 30 tablet 5       SCHEDULED MEDS:   amiodarone  200 mg Oral TID    apixaban  2.5 mg Oral BID    aspirin  81 mg Oral Daily    atorvastatin  40 mg Oral QHS    isosorbide mononitrate  30 mg Oral Daily    metoprolol tartrate  12.5 mg Oral BID    mupirocin   Nasal BID       CONTINUOUS INFUSIONS:   phenylephrine  0-5 mcg/kg/min Intravenous Continuous   Stopped at 07/01/24 1545       PRN MEDS:  Current Facility-Administered Medications:     0.9% NaCl, , Intravenous, PRN    0.9% NaCl, , Intravenous, PRN    acetaminophen, 650 mg, Oral, Q8H PRN    dextrose 50%, 12.5 g, Intravenous, PRN    dextrose 50%, 25 g, Intravenous, PRN    glucagon (human recombinant), 1 mg, Intramuscular, PRN    glucose, 16 g, Oral, PRN    glucose, 24 g, Oral, PRN    heparin (porcine), 5,000 Units, Intravenous, PRN    heparin (porcine), 5,000 Units, Intravenous, PRN    hydrALAZINE, 10 mg, Intravenous, Q8H PRN    magnesium oxide, 800 mg, Oral, PRN    magnesium oxide, 800 mg, Oral, PRN    melatonin, 6 mg, Oral, Nightly PRN    ondansetron, 8 mg, Intravenous, Q8H PRN    potassium bicarbonate, 35 mEq, Oral, PRN    potassium bicarbonate, 50 mEq, Oral, PRN    potassium bicarbonate, 60 mEq, Oral, PRN    potassium, sodium phosphates, 2 packet, Oral, PRN    potassium, sodium phosphates, 2 packet, Oral, PRN    potassium, sodium phosphates, 2 packet, Oral, PRN    promethazine (PHENERGAN) 12.5 mg in 0.9% NaCl 50 mL IVPB, 12.5 mg, Intravenous, Q6H PRN    sodium chloride 0.9%, 250 mL, Intravenous, PRN    sodium chloride 0.9%, 250 mL, Intravenous, PRN    sodium chloride 0.9%, 10 mL, Intravenous, PRN    LABS AND DIAGNOSTICS     CBC LAST 3 DAYS  Recent Labs   Lab  "06/30/24  0328 07/01/24  0235 07/02/24  0324   WBC 7.44 5.34 4.39   RBC 4.70 4.34 4.01   HGB 12.4 11.3* 10.5*   HCT 42.8 38.0 35.3*   MCV 91 88 88   MCH 26.4* 26.0* 26.2*   MCHC 29.0* 29.7* 29.7*   RDW 17.1* 16.6* 16.4*    158 150   MPV 10.4 10.1 10.6   GRAN 62.8  4.7 48.3  2.6 41.1  1.8   LYMPH 18.8  1.4 20.2  1.1 28.2  1.2   MONO 12.1  0.9 15.9*  0.9 13.2  0.6   BASO 0.03 0.03 0.01   NRBC 0 0 1*       COAGULATION LAST 3 DAYS  No results for input(s): "LABPT", "INR", "APTT" in the last 168 hours.    CHEMISTRY LAST 3 DAYS  Recent Labs   Lab 06/28/24  0018 06/28/24  0552 06/30/24  0328 06/30/24  1321 07/01/24  0235 07/02/24  0324      < > 137 136 136 136   K 6.5*   < > 5.9* 5.2* 4.6 4.2   CL 98   < > 102 100 98 99   CO2 30*   < > 25 24 25 25   ANIONGAP 12   < > 10 12 13 12   BUN 56*   < > 38* 42* 53* 34*   CREATININE 9.2*   < > 7.5* 8.1* 9.2* 6.5*   GLU 90   < > 54* 106 80 64*   CALCIUM 9.6   < > 8.9 8.9 8.0* 7.8*   MG 2.3  --   --   --   --   --    ALBUMIN 4.3   < > 3.7 3.6 3.3* 3.1*   PROT 7.6   < > 6.9  --  6.2 5.8*   ALKPHOS 64   < > 59  --  52* 47*   ALT 16   < > 12  --  13 12   AST 21   < > 16  --  18 18   BILITOT 0.4   < > 0.5  --  0.4 0.4    < > = values in this interval not displayed.       CARDIAC PROFILE LAST 3 DAYS  Recent Labs   Lab 06/28/24  0018 06/28/24  0553 06/28/24  1349 06/28/24  1701   *  --   --   --    TROPONINIHS 39.6* 61.9* 52.2* 56.7*       ENDOCRINE LAST 3 DAYS  Recent Labs   Lab 06/28/24  0553   TSH 1.151       LAST ARTERIAL BLOOD GAS  ABG  No results for input(s): "PH", "PO2", "PCO2", "HCO3", "BE" in the last 168 hours.    LAST 7 DAYS MICROBIOLOGY   Microbiology Results (last 7 days)       ** No results found for the last 168 hours. **            MOST RECENT IMAGING  X-Ray Chest 1 View  Narrative: EXAMINATION:  XR CHEST 1 VIEW    CLINICAL HISTORY:  cough;    COMPARISON:  06/28/2024    FINDINGS:  Cardiac silhouette size is stable compared to prior.  " Atherosclerotic calcification of the aorta.  No arleen pulmonary edema.  No confluent airspace disease.  No large pleural effusion or pneumothorax.  No acute osseous abnormality.  Impression: Stable chest radiograph.  No acute pulmonary process.    Electronically signed by: Aleksandar Castillo  Date:    07/01/2024  Time:    13:31      ECHOCARDIOGRAM RESULTS (last 5)  Results for orders placed during the hospital encounter of 01/04/24    Echo    Interpretation Summary    Left Ventricle: The left ventricle is normal in size. Normal wall thickness. Normal wall motion. There is normal systolic function with a visually estimated ejection fraction of 60 - 65%. There is diastolic dysfunction.    Right Ventricle: Normal right ventricular cavity size. Wall thickness is normal. Right ventricle wall motion  is normal. Systolic function is normal.    Left Atrium: Left atrium is severely dilated.    Mitral Valve: There is moderate bileaflet sclerosis. There is moderate mitral annular calcification present. There is mild to moderate regurgitation with a centrally directed jet.    Tricuspid Valve: There is mild to moderate regurgitation.    Pulmonary Artery: There is mild pulmonary hypertension. The estimated pulmonary artery systolic pressure is 41 mmHg.    IVC/SVC: Normal venous pressure at 3 mmHg.      Results for orders placed during the hospital encounter of 03/20/23    Echo    Interpretation Summary  · The left ventricle is normal in size with moderate concentric hypertrophy and normal systolic function.  · The estimated ejection fraction is 57%.  · Normal left ventricular diastolic function.  · Normal right ventricular size with normal right ventricular systolic function.  · Moderate to severe tricuspid regurgitation.  · Mild-to-moderate mitral regurgitation.  · Moderate left atrial enlargement.  · Mild right atrial enlargement.  · There is mild pulmonary hypertension.      Results for orders placed during the hospital encounter  of 03/06/23    Echo    Interpretation Summary  · The left ventricle is normal in size with normal systolic function.  · The estimated ejection fraction is 60%.  · Grade II left ventricular diastolic dysfunction.  · Normal right ventricular size with normal right ventricular systolic function.  · Moderate left atrial enlargement.  · Mild-to-moderate mitral regurgitation.  · Moderate to severe tricuspid regurgitation.  · There is mild pulmonary hypertension.  · There is mild aortic valve stenosis.  · Aortic valve area is 1.78 cm2; peak velocity is 2.01 m/s; mean gradient is 9 mmHg.      Results for orders placed during the hospital encounter of 01/26/22    Echo    Interpretation Summary  · The left ventricle is normal in size with mild concentric hypertrophy and normal systolic function.  · The estimated ejection fraction is 65%.  · Grade II left ventricular diastolic dysfunction.  · Atrial fibrillation not observed.  · Normal right ventricular size with normal right ventricular systolic function.  · Moderate to severe left atrial enlargement.  · Mild right atrial enlargement.  · Mild mitral regurgitation.  · Mild to moderate tricuspid regurgitation.  · Normal central venous pressure (3 mmHg).  · The estimated PA systolic pressure is 36 mmHg.  · Mildly elevated gradient accross mitral valve of around 6 mmHg at HR of 64 bpm. MVA by pressure half time is normal, however this can be inaccurate.      Results for orders placed during the hospital encounter of 03/03/21    Echo Color Flow Doppler? Yes    Interpretation Summary  · The left ventricle is small with concentric remodeling and normal systolic function. The estimated ejection fraction is 63%  · The estimated PA systolic pressure is 38 mmHg.  · Grade II left ventricular diastolic dysfunction.  · Normal right ventricular size with normal right ventricular systolic function.  · Mild-to-moderate mitral regurgitation.  · Mild to moderate tricuspid regurgitation.  ·  Normal central venous pressure (3 mmHg).  · The patient converted from atrial fibrillation to normal sinus rhythm 03/03/2021      CURRENT/PREVIOUS VISIT EKG  Results for orders placed or performed during the hospital encounter of 06/27/24   EKG 12-lead    Collection Time: 06/29/24  2:38 PM   Result Value Ref Range    QRS Duration 80 ms    [facility] QTC Calculation 442 ms    Narrative    Test Reason : R00.0,    Vent. Rate : 177 BPM     Atrial Rate : 177 BPM     P-R Int : 120 ms          QRS Dur : 080 ms      QT Int : 258 ms       P-R-T Axes : 057 -42 231 degrees     QTc Int : 442 ms    Sinus tachycardia  Left axis deviation  Marked ST abnormality, possible inferior subendocardial injury  Marked ST abnormality, possible anterolateral subendocardial injury  Abnormal ECG  When compared with ECG of 29-JUN-2024 13:34,  Significant changes have occurred    Referred By: KUNAL   SELF           Confirmed By:            ASSESSMENT/PLAN:     Active Hospital Problems    Diagnosis    *Chest pain    Goals of care, counseling/discussion    Hyperkalemia    ESRD (end stage renal disease)    Atrial fibrillation    Syncope and collapse       ASSESSMENT & PLAN:     Paroxysmal atrial fibrillation with RVR  Elevated troponin HS- likely type 2 MI, normal stress test  Severe calcific Mitral Stenosis, mean gradient 9 mm Hg at a heart rate of 74 beats per minute  ESRD on HD    RECOMMENDATIONS:    DC amio gtt - transition to amio 200 mg TID for 2 weeks than decrease to BID. Monitor for bradycardia.    Continue Lopressor 12.5 mg BID.   Continue Eliquis 2.5 mg p.o. b.i.d. for anticoagulation.  We will continue to follow at this time.       Lizette Villegas NP  Department of Cardiology  Date of Service: 07/02/2024

## 2024-07-02 NOTE — ASSESSMENT & PLAN NOTE
"I reviewed the patient's chart and discussed the case with the patient's team.      I examined Tyra Isaac at bedside.  The patient maintains capacity for complex medical decision-making.  I spoke with pt at bedside.    She recalls our conversation from yesterday. She talked about her cardiac event that happened during HD yesterday after we spoke. She reports feeling terrible during episode and she required cardiac shock x 2 attempts and she states it was not a pleasant experience. I let her know I am worried that she will cont to have cardiac issues with HD. She verbalized understanding and stated cardiologist and nephrologist voiced same concern.    We talked about code status again. She states she has abril in god and that she is "leaving things to him".  She wishes to cont with treatment for now and is hopeful that she will tolerate HD tomorrow.     She did give permission for me to speak with her son Raffi via telephone. I attempted to reach out and left VM.     Pt stated she felt tired today and c/o HA and wanted to rest. I asked pt if I could visit with her tomorrow after HD and she was agreeable.     For now pt is willing to cont with HD, invasive procedures and treatment. I will cont to follow along and anticipate I will need to cont to have goals of care discussions with pt.      I appreciate being involved in pts care. Please reach out if I can be of any assistance.   "

## 2024-07-02 NOTE — PROGRESS NOTES
"Critical access hospital  Palliative Medicine  Progress Note    Patient Name: Tyra Isaac  MRN: [id number]  Admission Date: 6/27/2024  Hospital Length of Stay: 4 days  Code Status: Full Code   Attending Provider: Khadra Bowens MD  Consulting Provider: Tamara Barker NP  Primary Care Physician: Kalpesh Goel MD  Principal Problem:Chest pain    Patient information was obtained from patient, past medical records, ER records, and primary team.      Assessment/Plan:     Palliative Care  Goals of care, counseling/discussion  I reviewed the patient's chart and discussed the case with the patient's team.      I examined Tyra Isaac at bedside.  The patient maintains capacity for complex medical decision-making.  I spoke with pt at bedside.    She recalls our conversation from yesterday. She talked about her cardiac event that happened during HD yesterday after we spoke. She reports feeling terrible during episode and she required cardiac shock x 2 attempts and she states it was not a pleasant experience. I let her know I am worried that she will cont to have cardiac issues with HD. She verbalized understanding and stated cardiologist and nephrologist voiced same concern.    We talked about code status again. She states she has abril in god and that she is "leaving things to him".  She wishes to cont with treatment for now and is hopeful that she will tolerate HD tomorrow.     She did give permission for me to speak with her son Raffi via telephone. I attempted to reach out and left VM.     Pt stated she felt tired today and c/o HA and wanted to rest. I asked pt if I could visit with her tomorrow after HD and she was agreeable.     For now pt is willing to cont with HD, invasive procedures and treatment. I will cont to follow along and anticipate I will need to cont to have goals of care discussions with pt.      I appreciate being involved in pts care. Please reach out if I can be of any " assistance.         I will follow-up with patient. Please contact us if you have any additional questions.    Subjective:     Chief Complaint:   Chief Complaint   Patient presents with    Chest Pain     Pt says she has been having pain in the center of her chest for the last two hours.  Pt sad she threw up prior to the pain.  Pt has hx of afib       HPI:   From admission HPI:  Patient is a 84-year-old female with history of atrial fibrillation on Eliquis, ESRD on dialysis Monday Wednesday Friday, CVA presents to emergency room with complaints of chest discomfort.  Patient reports chest discomfort started earlier today lasted about 2 hours, describes it as tightness radiating from the right to left side.  Resolved prior to coming to the emergency room.  Patient reports similar symptoms in the past.  Did have episode of emesis.  Potassium 6.5 on admission, ED physician spoke with Nephrology who ordered stat dialysis, ED physician placed hyperkalemia protocol.  Troponin 39.6.  Patient being admitted for chest pain workup.    Palliative medicine consult:  Pt admitted for cp and hyperkalemia. She underwent stress test which was neg. She was cont on HD. She has experienced cardiac issues and hypotension during each HD session over the past several days. She is a full code. Palliative medicine consulted for goals of care discussion.     Hospital Course:  No notes on file    Interval History: Pt clinically improved today. She reports feeling weak and fatigued today.     Medications:  Continuous Infusions:   phenylephrine  0-5 mcg/kg/min Intravenous Continuous   Stopped at 07/01/24 1545     Scheduled Meds:   amiodarone  200 mg Oral TID    apixaban  2.5 mg Oral BID    metoprolol tartrate  12.5 mg Oral BID    mupirocin   Nasal BID     PRN Meds:  Current Facility-Administered Medications:     0.9% NaCl, , Intravenous, PRN    0.9% NaCl, , Intravenous, PRN    acetaminophen, 650 mg, Oral, Q8H PRN    dextrose 50%, 12.5 g,  Intravenous, PRN    dextrose 50%, 25 g, Intravenous, PRN    glucagon (human recombinant), 1 mg, Intramuscular, PRN    glucose, 16 g, Oral, PRN    glucose, 24 g, Oral, PRN    heparin (porcine), 5,000 Units, Intravenous, PRN    heparin (porcine), 5,000 Units, Intravenous, PRN    hydrALAZINE, 10 mg, Intravenous, Q8H PRN    magnesium oxide, 800 mg, Oral, PRN    magnesium oxide, 800 mg, Oral, PRN    melatonin, 6 mg, Oral, Nightly PRN    ondansetron, 8 mg, Intravenous, Q8H PRN    potassium bicarbonate, 35 mEq, Oral, PRN    potassium bicarbonate, 50 mEq, Oral, PRN    potassium bicarbonate, 60 mEq, Oral, PRN    potassium, sodium phosphates, 2 packet, Oral, PRN    potassium, sodium phosphates, 2 packet, Oral, PRN    potassium, sodium phosphates, 2 packet, Oral, PRN    promethazine (PHENERGAN) 12.5 mg in 0.9% NaCl 50 mL IVPB, 12.5 mg, Intravenous, Q6H PRN    sodium chloride 0.9%, 250 mL, Intravenous, PRN    sodium chloride 0.9%, 250 mL, Intravenous, PRN    sodium chloride 0.9%, 10 mL, Intravenous, PRN    Objective:     Vital Signs (Most Recent):  Temp: 98.8 °F (37.1 °C) (07/02/24 1101)  Pulse: 67 (07/02/24 1145)  Resp: 20 (07/02/24 1145)  BP: (!) 158/72 (07/02/24 1130)  SpO2: 98 % (07/02/24 1145) Vital Signs (24h Range):  Temp:  [97.6 °F (36.4 °C)-98.8 °F (37.1 °C)] 98.8 °F (37.1 °C)  Pulse:  [56-82] 67  Resp:  [16-41] 20  SpO2:  [94 %-100 %] 98 %  BP: (100-169)/(55-76) 158/72     Weight: 48.7 kg (107 lb 5.8 oz)  Body mass index is 17.87 kg/m².       Physical Exam  Vitals and nursing note reviewed.   Constitutional:       General: She is not in acute distress.     Appearance: Normal appearance.   HENT:      Mouth/Throat:      Mouth: Mucous membranes are moist.   Eyes:      General: No scleral icterus.     Pupils: Pupils are equal, round, and reactive to light.   Cardiovascular:      Rate and Rhythm: Normal rate and regular rhythm.      Pulses: Normal pulses.   Pulmonary:      Effort: Pulmonary effort is normal. No respiratory  distress.   Skin:     General: Skin is warm and dry.   Neurological:      General: No focal deficit present.      Mental Status: She is alert and oriented to person, place, and time.   Psychiatric:         Mood and Affect: Mood normal.         Behavior: Behavior normal.         Thought Content: Thought content normal.         Judgment: Judgment normal.            Review of Symptoms      Symptom Assessment (ESAS 0-10 Scale)  Pain:  0  Dyspnea:  0  Anxiety:  0  Nausea:  0  Depression:  0  Anorexia:  0  Fatigue:  7  Insomnia:  0  Restlessness:  0  Agitation:  0         Living Arrangements:  Lives with family    Psychosocial/Cultural:   See Palliative Psychosocial Note: No  **Primary  to Follow**  Palliative Care  Consult: No        Advance Care Planning  Advance Directives:   Do Not Resuscitate Status: No      Decision Making:  Patient answered questions  Goals of Care: What is most important right now is to focus on quality of life, even if it means sacrificing a little time, curative/life-prolongation (regardless of treatment burdens). Accordingly, we have decided that the best plan to meet the patient's goals includes continuing with treatment.         Significant Labs: All pertinent labs within the past 24 hours have been reviewed.  CBC:   Recent Labs   Lab 07/02/24 0324   WBC 4.39   HGB 10.5*   HCT 35.3*   MCV 88        BMP:  Recent Labs   Lab 07/02/24 0324   GLU 64*      K 4.2   CL 99   CO2 25   BUN 34*   CREATININE 6.5*   CALCIUM 7.8*     LFT:  Lab Results   Component Value Date    AST 18 07/02/2024    ALKPHOS 47 (L) 07/02/2024    BILITOT 0.4 07/02/2024     Albumin:   Albumin   Date Value Ref Range Status   07/02/2024 3.1 (L) 3.5 - 5.2 g/dL Final     Protein:   Total Protein   Date Value Ref Range Status   07/02/2024 5.8 (L) 6.0 - 8.4 g/dL Final     Lactic acid:   Lab Results   Component Value Date    LACTATE 0.7 01/04/2024    LACTATE 2.2 (HH) 01/04/2024       Significant  Imaging: I have reviewed all pertinent imaging results/findings within the past 24 hours.    50 min visit spent in chart review, face to face discussion of goals of care,  symptom assessment, coordination of care and emotional support.     Tamara Barker NP  Palliative Medicine  [facility]

## 2024-07-02 NOTE — PT/OT/SLP EVAL
Physical Therapy Evaluation    Patient Name:  Tyra Isaac   MRN:  [id number]    Recommendations:     Discharge Recommendations: Low Intensity Therapy   Discharge Equipment Recommendations: none   Barriers to discharge:  fall risk, medical status     Assessment:     Tyra Isaac is a 84 y.o. female admitted with a medical diagnosis of Chest pain.  She presents with the following impairments/functional limitations: weakness, impaired endurance, impaired self care skills, impaired functional mobility, gait instability, impaired balance, decreased lower extremity function, impaired cardiopulmonary response to activity.    Pt was found HOB elevated and agreeable to therapy evaluation. Pt required SBA for bed mobility to reach EOB. Pt transferred to standing with RW and CGA. Pt states that she performs exercises at home that she learned from HHPT and walks in the myers with her rollator. Pt ambulated 80 ft with RW and CGA. Pt presented with decreased gait speed and poor foot clearance but had good RW management and balance throughout ambulation. Upon returning to the room, pt was sat up in chair and left with all needs met.     Rehab Prognosis: Fair; patient would benefit from acute skilled PT services to address these deficits and reach maximum level of function.    Recent Surgery: * No surgery found *      Plan:     During this hospitalization, patient to be seen 5 x/week to address the identified rehab impairments via gait training, therapeutic activities, therapeutic exercises, neuromuscular re-education and progress toward the following goals:    Plan of Care Expires:  08/02/24    Subjective     Chief Complaint: Feeling weaker than normal   Patient/Family Comments/goals: Get better/ go home   Pain/Comfort:  Pain Rating 1: 0/10    Patients cultural, spiritual, Pentecostalism conflicts given the current situation: no    Living Environment:  Pt lives with son and DIL in a [facility] with 2-3 KEY. (-) .   Prior to  admission, patients level of function was Kady with rollator and WC.  Equipment used at home: wheelchair, rollator.  DME owned (not currently used): none.  Upon discharge, patient will have assistance from son.    Objective:     Communicated with RN prior to session.  Patient found HOB elevated with bed alarm, blood pressure cuff, peripheral IV, pulse ox (continuous), telemetry  upon PT entry to room.    General Precautions: Standard, fall  Orthopedic Precautions:N/A   Braces: N/A  Respiratory Status: Room air    Exams:  RLE ROM: WFL  RLE Strength: 5/5 except hip flexion 4/5  LLE ROM: WFL  LLE Strength: 5/5 except hip flexion 4/5    Functional Mobility:  Bed Mobility:     Supine to Sit: stand by assistance  Transfers:     Sit to Stand:  contact guard assistance with rolling walker  Gait: ambulated 80 ft with RW and CGA       AM-PAC 6 CLICK MOBILITY  Total Score:20       Treatment & Education:  Pt was educated on the importance of time OOB, POC, safe transfers and ambulation, and use of call button for further assistance.     Patient left up in chair with all lines intact, call button in reach, chair alarm on, and RN notified.    GOALS:   Multidisciplinary Problems       Physical Therapy Goals          Problem: Physical Therapy    Goal Priority Disciplines Outcome Goal Variances Interventions   Physical Therapy Goal     PT, PT/OT      Description: Goals to be met by: 2024     Patient will increase functional independence with mobility by performin. Supine to sit with Supervision   2. Sit to supine with Supervision   3. Sit to stand transfer with Supervision   4. Gait  x 200 feet with Supervision using Rolling Walker.                         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    Osteopenia of multiple sites 3/9/2018    Based upon bone density measurements. Patient also has chronic kidney disease.    Stroke 11/2016       Past Surgical History:   Procedure Laterality Date    ANGIOGRAM, CORONARY, WITH LEFT HEART CATHETERIZATION N/A 2/7/2022    Procedure: Angiogram, Coronary, with Left Heart Cath;  Surgeon: Gino Leal MD;  Location: [facility] CATH/EP LAB;  Service: Cardiology;  Laterality: N/A;    CARDIAC SURGERY      stents    EYE SURGERY      WRIST SURGERY         Time Tracking:     PT Received On: 07/02/24  PT Start Time: 1125     PT Stop Time: 1149  PT Total Time (min): 24 min     Billable Minutes: Evaluation 12 and Gait Training 12      07/02/2024

## 2024-07-02 NOTE — PT/OT/SLP EVAL
Occupational Therapy   Evaluation    Name: Tyra Isaac  MRN: [id number]  Admitting Diagnosis: Chest pain  Recent Surgery: * No surgery found *      Recommendations:     Discharge Recommendations: Low Intensity Therapy  Discharge Equipment Recommendations:  none  Barriers to discharge:  None    Assessment:     Tyra Isaac is a 84 y.o. female with a medical diagnosis of Chest pain.  She presents with general weakness. Performance deficits affecting function: weakness, impaired endurance, impaired self care skills, gait instability, impaired functional mobility, decreased safety awareness, decreased lower extremity function, decreased upper extremity function, impaired balance, impaired cardiopulmonary response to activity.      Rehab Prognosis: Fair; patient would benefit from acute skilled OT services to address these deficits and reach maximum level of function.       Plan:     Patient to be seen 3 x/week to address the above listed problems via self-care/home management, therapeutic activities, therapeutic exercises  Plan of Care Expires: 08/02/24  Plan of Care Reviewed with: patient    Subjective     Chief Complaint: General weakness  Patient/Family Comments/goals: Improved functional mobility and ADL independence.    Occupational Profile:  Living Environment: lives with son and ROCIO in 1 story home.  Previous level of function: able to stand and perform transfers using rolling walker. Uses wheelchair to mobilize in the home. DIL assists with bathing. Relies on son and ROCIO for household management and transportation.  Roles and Routines: Limited homemaker  Equipment Used at Home: walker, rolling, rollator, wheelchair, bedside commode  Assistance upon Discharge: Son and ROCIO    Pain/Comfort:  Pain Rating 1: 5/10  Location 1: back  Pain Rating Post-Intervention 1: 5/10    Patients cultural, spiritual, Voodoo conflicts given the current situation: no    Objective:     Communicated with: nurse prior to  session.  Patient found HOB elevated with telemetry, peripheral IV upon OT entry to room.    General Precautions: Standard, fall  Orthopedic Precautions: N/A  Braces: N/A  Respiratory Status: Room air    Occupational Performance:    Bed Mobility:    Patient completed Scooting/Bridging with contact guard assistance  Patient completed Supine to Sit with contact guard assistance  Patient completed Sit to Supine with contact guard assistance  Performed unsupported sitting EOB with contact guard assistance.    Functional Mobility/Transfers:  Patient completed Sit <> Stand Transfer with contact guard assistance  with  rolling walker     Activities of Daily Living:  Lower Body Dressing: contact guard assistance to don/doff socks sitting EOB.    Cognitive/Visual Perceptual:  Cognitive/Psychosocial Skills:     -       Oriented to: Person, Place, and Situation   -       Follows Commands/attention:Follows two-step commands  -       Communication: clear/fluent  -       Memory: No Deficits noted  -       Safety awareness/insight to disability: impaired   -       Mood/Affect/Coping skills/emotional control: Cooperative and Pleasant  Visual/Perceptual:      -Intact Acuity    Physical Exam:  Balance:    -       Sitting/Standing: Contact Guard   Upper Extremity Range of Motion:     -       Right Upper Extremity: WFL  -       Left Upper Extremity: WFL  Upper Extremity Strength:    -       Right Upper Extremity: 3+/5  -       Left Upper Extremity: 3+/5   Strength:    -       Right Upper Extremity: WFL  -       Left Upper Extremity: WFL  Fine Motor Coordination:    -       Intact Acuity    AMPAC 6 Click ADL:  AMPAC Total Score: 15    Treatment & Education:  Patient educated on the purpose of Occupational Therapy and the importance of getting OOB.    Patient left HOB elevated with all lines intact, call button in reach, and bed alarm on    GOALS:   Multidisciplinary Problems       Occupational Therapy Goals          Problem:  Occupational Therapy    Goal Priority Disciplines Outcome Interventions   Occupational Therapy Goal     OT, PT/OT     Description: Goals to be met by: 8/2/2024     Patient will increase functional independence with ADLs by performing:    UE Dressing with Supervision.  LE Dressing with Supervision.  Grooming while standing at sink with Stand-by Assistance.  Toileting from toilet with Stand-by Assistance for hygiene and clothing management.   Toilet transfer to toilet with Stand-by Assistance.                         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    Osteopenia of multiple sites 3/9/2018    Based upon bone density measurements. Patient also has chronic kidney disease.    Stroke 11/2016         Past Surgical History:   Procedure Laterality Date    ANGIOGRAM, CORONARY, WITH LEFT HEART CATHETERIZATION N/A 2/7/2022    Procedure: Angiogram, Coronary, with Left Heart Cath;  Surgeon: Gino Leal MD;  Location: [facility] CATH/EP LAB;  Service: Cardiology;  Laterality: N/A;    CARDIAC SURGERY      stents    EYE SURGERY      WRIST SURGERY         Time Tracking:     OT Date of Treatment: 07/02/24  OT Start Time: 1033  OT Stop Time: 1051  OT Total Time (min): 18 min    Billable Minutes:Evaluation 8  Self Care/Home Management 10    7/2/2024

## 2024-07-02 NOTE — ASSESSMENT & PLAN NOTE
This patient has hyperkalemia which is uncontrolled. We will monitor for arrhythmias with EKG or continuous telemetry. We will treat the hyperkalemia with IV insulin and dextrose and Nebulized albuterol sulfate. The likely etiology of the hyperkalemia is ESRD.  The patients latest potassium has been reviewed and the results are listed below  Recent Labs   Lab 07/02/24  0324   K 4.2     Management per dialysis

## 2024-07-02 NOTE — NURSING
Nurses Note -- 4 Eyes      7/2/2024   8:01 AM      Skin assessed during: Daily Assessment      [x] No Altered Skin Integrity Present    [x]Prevention Measures Documented  Right upper arm fistula    [] Yes- Altered Skin Integrity Present or Discovered   [] LDA Added if Not in Epic (Describe Wound)   [] New Altered Skin Integrity was Present on Admit and Documented in LDA   [] Wound Image Taken    Wound Care Consulted? No    Attending Nurse:  AMY Nina    Second RN/Staff Member:  AMY Soriano

## 2024-07-02 NOTE — ASSESSMENT & PLAN NOTE
On Eliquis and Multaq outpatient - continue  Cardiology following.   Cont lopressor   IV amiodarone changed to PO

## 2024-07-02 NOTE — PROGRESS NOTES
INPATIENT NEPHROLOGY Progress  [facility] NEPHROLOGY    Tyra Isaac  07/02/2024    Reason for consultation:    esrd    Chief Complaint:   Chief Complaint   Patient presents with    Chest Pain     Pt says she has been having pain in the center of her chest for the last two hours.  Pt sad she threw up prior to the pain.  Pt has hx of afib          History of Present Illness:    Per H and P   Patient is a 84-year-old female with history of atrial fibrillation on Eliquis, ESRD on dialysis Monday Wednesday Friday, CVA presents to emergency room with complaints of chest discomfort.  Patient reports chest discomfort started earlier today lasted about 2 hours, describes it as tightness radiating from the right to left side.  Resolved prior to coming to the emergency room.  Patient reports similar symptoms in the past.  Did have episode of emesis.  Potassium 6.5 on admission, ED physician spoke with Nephrology who ordered stat dialysis, ED physician placed hyperkalemia protocol.  Troponin 39.6.  Patient being admitted for chest pain workup.     6/28  Patient seen on hemodialysis for uremic clearance and ultrafiltration.    Cp resolved.  No sob.    Addendum:  --about 30 minutes after I saw her she had a syncopal episode.  Her blood sugar was 90.   Her blood was rinsed back.   She never became pulseless.  She woke up after the blood was returned.  She briefly had hypotension.  Her bp came up to 150s/80s.   She woke up and was cognitive and appropriate.  No chest pain or sob.   She received about an hour and a half of dialysis  6/29  665cc off w/HD yesterday.  VSS, no chemistry results.  LADI for stress test, then will have dialysis--only got about an hour and a half of yesterday's tx.  6/30 nuclear stress test neg- went into SVT during last 30min of HD so treatment terminated with rapid response called- patient given metoprolol IV, diltiazem IV and 250cc NS bolus and moved to ICU- got off 800cc UF yest- HR < 100, BP  stable, on RA- started on oral beta blocker    7/1  AFebrile, intermittent hypotension.  C/o coughing more today  7/2  back in SR.  Dialysis stopped short yesterday due to Afib with RVR and hemodynamic instability.  She was placed on phenylephrine for vasopressor  support (now stopped) and an amiodarone drip for antiarrhythmic support.        Plan of Care:     ESRD on HD MWF  Syncope  Chest pain  AF/SVT  Elevated trop- demand ischemia  HTN  Hyperkalemia  SHPT  Anemia of CKD  Hypoglycemia  pCRX      - continue HD MWF.  If she continues to become unstable during her treatments dialysis will need to be held.    - syncope- hypoglycemia- management per   - chest pain- nuclear stress test neg  - cardiology started a low dose beta blocker- monitor for bradycardia  - trend troponin- patient may not be able to afford eliquis- nurse looking into it  - BP controlled- UF is very challenging due to hemodynamic instability- will have TBD goal on HD days right now- liberalize fluids to 1.5L since there may be a component of volume depletion  - hyperK noted this AM- got kayexalate- repeat renal at 2pm- on renal diet  - rechecked phos--at goal  - no acute BRAYAN needs  - BG is low- defer management to     Patient target weight 46kg- usually only pulled down to 46.5.  Looks at [facility] flow sheets- barely take off 500-1L per treatment.  Will go ahead and change dry weight to 47kg for now and reassess.    Discharge pending medication acquisition and repeat renal panel to f/u high K. Also need to make sure she doesn't remain hypoglycemic.    Thank you for allowing us to participate in this patient's care. We will continue to follow.    Vital Signs:  Temp Readings from Last 3 Encounters:   07/02/24 98.4 °F (36.9 °C) (Oral)   06/03/24 98.5 °F (36.9 °C) (Oral)   03/19/24 97.4 °F (36.3 °C) (Tympanic)       Pulse Readings from Last 3 Encounters:   07/02/24 74   06/06/24 71   06/03/24 69       BP Readings from Last 3 Encounters:   07/02/24  (!) 155/70   06/06/24 (!) 122/56   06/03/24 130/60       Weight:  Wt Readings from Last 3 Encounters:   06/28/24 48.7 kg (107 lb 5.8 oz)   06/28/24 48.7 kg (107 lb 5.8 oz)   06/06/24 46.7 kg (103 lb)     Medications:  No current facility-administered medications on file prior to encounter.     Current Outpatient Medications on File Prior to Encounter   Medication Sig Dispense Refill    atorvastatin (LIPITOR) 40 MG tablet Take 40 mg by mouth once daily.      b complex vitamins tablet Take 1 tablet by mouth once daily.      calcium acetate,phosphat bind, (PHOSLO) 667 mg capsule Take 667 mg by mouth 3 (three) times daily with meals.      dorzolamide-timolol 2-0.5% (COSOPT) 22.3-6.8 mg/mL ophthalmic solution Place 1 drop into both eyes 2 (two) times daily.      dronedarone (MULTAQ) 400 mg Tab Take 1 tablet (400 mg total) by mouth 2 (two) times daily with meals. 180 tablet 0    ELIQUIS 2.5 mg Tab Take 2.5 mg by mouth 2 (two) times daily.      latanoprost 0.005 % ophthalmic solution Place 1 drop into both eyes every evening.      loperamide (IMODIUM A-D) 2 mg Tab Take 1 tablet (2 mg total) by mouth 4 (four) times daily as needed (diarrhea). 3 tablet 0    midodrine (PROAMATINE) 10 MG tablet Take 1 tablet (10 mg total) by mouth 3 (three) times daily with meals. 270 tablet 0    ondansetron (ZOFRAN-ODT) 4 MG TbDL Take 1 tablet (4 mg total) by mouth every 6 (six) hours as needed (nausea). 30 tablet 5     Scheduled Meds:   amiodarone  200 mg Oral TID    apixaban  2.5 mg Oral BID    aspirin  81 mg Oral Daily    atorvastatin  40 mg Oral QHS    isosorbide mononitrate  30 mg Oral Daily    metoprolol tartrate  12.5 mg Oral BID    mupirocin   Nasal BID     Continuous Infusions:   phenylephrine  0-5 mcg/kg/min Intravenous Continuous   Stopped at 07/01/24 1545     PRN Meds:.  Current Facility-Administered Medications:     0.9% NaCl, , Intravenous, PRN    0.9% NaCl, , Intravenous, PRN    acetaminophen, 650 mg, Oral, Q8H PRN    dextrose  "50%, 12.5 g, Intravenous, PRN    dextrose 50%, 25 g, Intravenous, PRN    glucagon (human recombinant), 1 mg, Intramuscular, PRN    glucose, 16 g, Oral, PRN    glucose, 24 g, Oral, PRN    heparin (porcine), 5,000 Units, Intravenous, PRN    heparin (porcine), 5,000 Units, Intravenous, PRN    hydrALAZINE, 10 mg, Intravenous, Q8H PRN    magnesium oxide, 800 mg, Oral, PRN    magnesium oxide, 800 mg, Oral, PRN    melatonin, 6 mg, Oral, Nightly PRN    ondansetron, 8 mg, Intravenous, Q8H PRN    potassium bicarbonate, 35 mEq, Oral, PRN    potassium bicarbonate, 50 mEq, Oral, PRN    potassium bicarbonate, 60 mEq, Oral, PRN    potassium, sodium phosphates, 2 packet, Oral, PRN    potassium, sodium phosphates, 2 packet, Oral, PRN    potassium, sodium phosphates, 2 packet, Oral, PRN    promethazine (PHENERGAN) 12.5 mg in 0.9% NaCl 50 mL IVPB, 12.5 mg, Intravenous, Q6H PRN    sodium chloride 0.9%, 250 mL, Intravenous, PRN    sodium chloride 0.9%, 250 mL, Intravenous, PRN    sodium chloride 0.9%, 10 mL, Intravenous, PRN    Review of Systems:  Neg    Physical Exam:    BP (!) 155/70   Pulse 74   Temp 98.4 °F (36.9 °C) (Oral)   Resp (!) 37   Ht 5' 5" (1.651 m)   Wt 48.7 kg (107 lb 5.8 oz)   SpO2 99%   BMI 17.87 kg/m²     General Appearance:    Alert, cooperative, no distress, appears stated age   Head:    Normocephalic, without obvious abnormality, atraumatic   Eyes:    PER, conjunctiva/corneas clear, EOM's intact in both eyes        Throat:   Lips, mucosa, and tongue normal; teeth and gums normal   Back:     Symmetric, no curvature, ROM normal, no CVA tenderness   Lungs:     Clear to auscultation bilaterally, respirations unlabored   Chest wall:    No tenderness or deformity   Heart:    Regular rate and rhythm, S1 and S2 normal, no murmur, rub   or gallop   Abdomen:     Soft, non-tender, bowel sounds active all four quadrants,     no masses, no organomegaly   Extremities:   Extremities normal, atraumatic, no cyanosis or edema "   Pulses:   2+ and symmetric all extremities   MSK:   No joint or muscle swelling, tenderness or deformity   Skin:   Skin color, texture, turgor normal, no rashes or lesions   Neurologic:   CNII-XII intact, normal strength and sensation       Throughout.  No flap     Results:  Lab Results   Component Value Date     07/02/2024    K 4.2 07/02/2024    CL 99 07/02/2024    CO2 25 07/02/2024    BUN 34 (H) 07/02/2024    CREATININE 6.5 (H) 07/02/2024    CALCIUM 7.8 (L) 07/02/2024    ANIONGAP 12 07/02/2024    ESTGFRAFRICA 4.7 (A) 02/08/2022    EGFRNONAA 4.0 (A) 02/08/2022       Lab Results   Component Value Date    CALCIUM 7.8 (L) 07/02/2024    PHOS 4.6 (H) 06/30/2024       Recent Labs   Lab 07/02/24  0324   WBC 4.39   RBC 4.01   HGB 10.5*   HCT 35.3*      MCV 88   MCH 26.2*   MCHC 29.7*        Imaging Results              X-Ray Chest AP Portable (Final result)  Result time 06/28/24 01:10:09      Final result by Aldo Espinosa MD (06/28/24 01:10:09)                   Impression:      Radiographic findings as above.      Electronically signed by: Aldo Espinosa  Date:    06/28/2024  Time:    01:10               Narrative:    EXAMINATION:  XR CHEST AP PORTABLE    CLINICAL HISTORY:  Chest Pain;    TECHNIQUE:  Single frontal view of the chest was performed.    COMPARISON:  Prior examinations, most recent of which is March 19, 2024    FINDINGS:  Single portable chest view is submitted.  When accounting for position and technique and depth of inspiration the appearance of the cardiomediastinal silhouette is stable.  Aortic atherosclerotic change and mild tortuosity is noted.    Mild accentuation of interstitial markings may relate to a pattern of mild interstitial infiltrate/edema.  There is no focal consolidation, there is no pleural effusion or pneumothorax.  Skin folds overlie the patient.    The osseous structures demonstrate chronic change.                        Wet Read by Jacquie Wang MD (06/28/24  00:32:39, [facility] - Emergency Dept, Emergency Medicine)    No pulmonary infiltrates no pulmonary edema no cardiomegaly no pleural effusion                                    Patient care was time spent personally by me on the following activities: > 35 min  Obtaining a history  Examination of patient.  Providing medical care at the patients bedside.  Developing a treatment plan with patient or surrogate and bedside caregivers  Ordering and reviewing laboratory studies, radiographic studies, pulse oximetry.  Ordering and performing treatments and interventions.  Evaluation of patient's response to treatment.  Discussions with consultants while on the unit and immediately available to the patient.  Re-evaluation of the patient's condition.  Documentation in the medical record.       I have personally reviewed pertinent radiological imaging and reports.    Jimenez Valero MD    [facility] [place]  [phone]

## 2024-07-02 NOTE — PLAN OF CARE
Problem: Adult Inpatient Plan of Care  Goal: Patient-Specific Goal (Individualized)  Outcome: Progressing  Goal: Absence of Hospital-Acquired Illness or Injury  Outcome: Progressing  Goal: Optimal Comfort and Wellbeing  Outcome: Progressing  Goal: Readiness for Transition of Care  Outcome: Progressing     Problem: Skin Injury Risk Increased  Goal: Skin Health and Integrity  Outcome: Progressing     Problem: Fall Injury Risk  Goal: Absence of Fall and Fall-Related Injury  Outcome: Progressing     Problem: Dysrhythmia  Goal: Normalized Cardiac Rhythm  Outcome: Progressing     Problem: Coping Ineffective  Goal: Effective Coping  Outcome: Progressing

## 2024-07-02 NOTE — NURSING
Nurses Note -- 4 Eyes      7/1/2024   9:21 PM      Skin assessed during: Q Shift Change      [x] No Altered Skin Integrity Present    []Prevention Measures Documented      [] Yes- Altered Skin Integrity Present or Discovered   [] LDA Added if Not in Epic (Describe Wound)   [] New Altered Skin Integrity was Present on Admit and Documented in LDA   [] Wound Image Taken    Wound Care Consulted? No    Attending Nurse:  Aliyah Henao RN/Staff Member:  Delores

## 2024-07-02 NOTE — ASSESSMENT & PLAN NOTE
Creatine stable for now. BMP reviewed- noted Estimated Creatinine Clearance: 5 mL/min (A) (based on SCr of 6.5 mg/dL (H)). according to latest data. Based on current GFR, CKD stage is end stage.  Monitor UOP and serial BMP and adjust therapy as needed. Renally dose meds. Avoid nephrotoxic medications and procedures.  Nephrology follow up for HD, patient seems to be not tolerating HD with episodes of hypotension. Palliative care has been consulted.

## 2024-07-02 NOTE — RESPIRATORY THERAPY
07/01/24 2007   Patient Assessment/Suction   Level of Consciousness (AVPU) alert   Respiratory Effort Normal;Unlabored   Expansion/Accessory Muscles/Retractions no use of accessory muscles;no retractions   All Lung Fields Breath Sounds Anterior:;clear   Rhythm/Pattern, Respiratory unlabored;no shortness of breath reported   Cough Frequency infrequent   Cough Type no productive sputum   PRE-TX-O2   Device (Oxygen Therapy) room air   SpO2 100 %   Pulse Oximetry Type Continuous   $ Pulse Oximetry - Multiple Charge Pulse Oximetry - Multiple   Pulse 66   Resp (!) 28   Positioning HOB elevated 30 degrees   Education   $ Education 15 min;Other (see comment)

## 2024-07-02 NOTE — PROGRESS NOTES
[facility] Medicine  Progress Note    Patient Name: Tyra Isaac  MRN: [id number]  Patient Class: IP- Inpatient   Admission Date: 6/27/2024  Length of Stay: 4 days  Attending Physician: Khadra Bowens MD  Primary Care Provider: Kalpesh Goel MD        Subjective:     Principal Problem:Chest pain        HPI:  Patient is a 84-year-old female with history of atrial fibrillation on Eliquis, ESRD on dialysis Monday Wednesday Friday, CVA presents to emergency room with complaints of chest discomfort.  Patient reports chest discomfort started earlier today lasted about 2 hours, describes it as tightness radiating from the right to left side.  Resolved prior to coming to the emergency room.  Patient reports similar symptoms in the past.  Did have episode of emesis.  Potassium 6.5 on admission, ED physician spoke with Nephrology who ordered stat dialysis, ED physician placed hyperkalemia protocol.  Troponin 39.6.  Patient being admitted for chest pain workup.    Overview/Hospital Course:  Patient was admitted with chest pain and hyperkalemia.  Her EKG was nonischemic and her troponins were noted to be elevated, which is baseline for her 2/2 ESRD. She remained chest pain free during her stay.  Nephrology was consulted for routine dialysis.  She was maintained on telemetry and cardiology was consulted.  She received medications to shift her potassium and dialysis was ordered.  Her dialysis was cut short due to syncopal episode with hypotension.  She was monitored overnight.  She went for stress testing, which was negative for reversible ischemia.  Dialysis was performed the next day and had to be stopped secondary to SVT.   During hospital stay patient got syncopal episode during dialysis and it was stopped.  Cardiology reviewed and stress test ordered and came back negative.  She had another episode of severe tachycardia after dialysis and needed Lopressor and Cardizem IV and moved to ICU.   Later beta-blocker added(initially patient was on beta-blocker for unsure reason possible hypotension) rate and blood pressure better controlled and downgraded.  Code status discussed in detail with the patient and she agreed for consultation to palliative care, however patient wants to remain full code.     Patient continue to have episodes of hypotension during dialysis. On 7/1 during dialysis patient became tachycardic and hypotensive in afib RVR. She was cardioverted and started on amio drip and phenylephrine drip. Patient is off pressors this morning. She is considering to become DNR, states she wants to talk with family. Amio drip was changed to PO.        Interval History: Patient alert and oriented, HR and BP controlled this morning. Denies any chest pain or SOB. States dialysis yesterday was aborted due to hemodynamic instability. Patient states that she is considering becoming DNR, but wants to talk with family.     Review of Systems  Objective:     Vital Signs (Most Recent):  Temp: 98.8 °F (37.1 °C) (07/02/24 1101)  Pulse: 67 (07/02/24 1145)  Resp: 20 (07/02/24 1145)  BP: (!) 158/72 (07/02/24 1130)  SpO2: 98 % (07/02/24 1145) Vital Signs (24h Range):  Temp:  [97.6 °F (36.4 °C)-98.8 °F (37.1 °C)] 98.8 °F (37.1 °C)  Pulse:  [] 67  Resp:  [16-41] 20  SpO2:  [80 %-100 %] 98 %  BP: ()/(43-76) 158/72     Weight: 48.7 kg (107 lb 5.8 oz)  Body mass index is 17.87 kg/m².    Intake/Output Summary (Last 24 hours) at 7/2/2024 1202  Last data filed at 7/2/2024 1000  Gross per 24 hour   Intake 1730.1 ml   Output 0 ml   Net 1730.1 ml         Physical Exam  Vitals and nursing note reviewed.   HENT:      Head: Normocephalic and atraumatic.   Eyes:      Conjunctiva/sclera: Conjunctivae normal.   Neck:      Vascular: No JVD.   Cardiovascular:      Rate and Rhythm: irregular, rate controlled.      Heart sounds: Normal heart sounds.   Pulmonary:      Effort: Pulmonary effort is normal.      Breath sounds: Normal  breath sounds.   Abdominal:      Palpations: Abdomen is soft.   Musculoskeletal:         General: Normal range of motion.   Skin:     General: Skin is warm.   Neurological:      Mental Status: She is alert and oriented to person, place, and time.   Psychiatric:         Mood and Affect: Mood normal.         Significant Labs: All pertinent labs within the past 24 hours have been reviewed.  CBC:   Recent Labs   Lab 07/01/24  0235 07/02/24  0324   WBC 5.34 4.39   HGB 11.3* 10.5*   HCT 38.0 35.3*    150     CMP:   Recent Labs   Lab 06/30/24  1321 07/01/24  0235 07/02/24  0324    136 136   K 5.2* 4.6 4.2    98 99   CO2 24 25 25    80 64*   BUN 42* 53* 34*   CREATININE 8.1* 9.2* 6.5*   CALCIUM 8.9 8.0* 7.8*   PROT  --  6.2 5.8*   ALBUMIN 3.6 3.3* 3.1*   BILITOT  --  0.4 0.4   ALKPHOS  --  52* 47*   AST  --  18 18   ALT  --  13 12   ANIONGAP 12 13 12       Significant Imaging: I have reviewed all pertinent imaging results/findings within the past 24 hours.    Assessment/Plan:      * Chest pain  Cardiac work up negative   Troponin 39.6 --> 61.9  Echo show normal EF   Stress test  negative for reversible ischemia  aspirin daily  Lopressor  Follow cardiology           Goals of care, counseling/discussion  Palliative care following, patient considering DNR. Waiting for final decisions.          Hyperkalemia  This patient has hyperkalemia which is uncontrolled. We will monitor for arrhythmias with EKG or continuous telemetry. We will treat the hyperkalemia with IV insulin and dextrose and Nebulized albuterol sulfate. The likely etiology of the hyperkalemia is ESRD.  The patients latest potassium has been reviewed and the results are listed below  Recent Labs   Lab 07/02/24  0324   K 4.2     Management per dialysis           ESRD (end stage renal disease)  Creatine stable for now. BMP reviewed- noted Estimated Creatinine Clearance: 5 mL/min (A) (based on SCr of 6.5 mg/dL (H)). according to latest data.  Based on current GFR, CKD stage is end stage.  Monitor UOP and serial BMP and adjust therapy as needed. Renally dose meds. Avoid nephrotoxic medications and procedures.  Nephrology follow up for HD, patient seems to be not tolerating HD with episodes of hypotension. Palliative care has been consulted.     Atrial fibrillation  On Eliquis and Multaq outpatient - continue  Cardiology following.   Cont lopressor   IV amiodarone changed to PO     Syncope and collapse  Secondary to dialysis and hypotension  Continue to monitor vital signs closely.  Patient agreed for palliative care consult  Patient still get hypotensive during dialysis   ? Start midodrine on dialysis days, will leave this up to nephrology         VTE Risk Mitigation (From admission, onward)           Ordered     heparin (porcine) injection 5,000 Units  As needed (PRN)         06/29/24 1908     heparin (porcine) injection 5,000 Units  As needed (PRN)         06/29/24 1908     apixaban tablet 2.5 mg  2 times daily         06/28/24 0207     IP VTE HIGH RISK PATIENT  Once         06/28/24 0207     Place sequential compression device  Until discontinued         06/28/24 0207                    Discharge Planning   ILAN: 7/3/2024     Code Status: Full Code   Is the patient medically ready for discharge?:     Reason for patient still in hospital (select all that apply): Treatment  Discharge Plan A: Home Health   Discharge Delays: None known at this time          Khadra Bowens MD  Department of Hospital Medicine   [facility]

## 2024-07-02 NOTE — ASSESSMENT & PLAN NOTE
Secondary to dialysis and hypotension  Continue to monitor vital signs closely.  Patient agreed for palliative care consult  Patient still get hypotensive during dialysis   ? Start midodrine on dialysis days, will leave this up to nephrology

## 2024-07-02 NOTE — ASSESSMENT & PLAN NOTE
Cardiac work up negative   Troponin 39.6 --> 61.9  Echo show normal EF   Stress test  negative for reversible ischemia  aspirin daily  Lopressor  Follow cardiology

## 2024-07-03 PROBLEM — D63.1 ANEMIA ASSOCIATED WITH STAGE 5 CHRONIC RENAL FAILURE: Status: ACTIVE | Noted: 2022-01-27

## 2024-07-03 PROBLEM — N18.5 ANEMIA ASSOCIATED WITH STAGE 5 CHRONIC RENAL FAILURE: Status: ACTIVE | Noted: 2022-01-27

## 2024-07-03 PROBLEM — I24.89 DEMAND ISCHEMIA OF MYOCARDIUM: Status: ACTIVE | Noted: 2024-07-03

## 2024-07-03 PROBLEM — I05.0 SEVERE MITRAL VALVE STENOSIS: Status: ACTIVE | Noted: 2024-07-03

## 2024-07-03 PROBLEM — I48.0 PAROXYSMAL ATRIAL FIBRILLATION: Status: ACTIVE | Noted: 2022-12-09

## 2024-07-03 LAB
ALBUMIN SERPL BCP-MCNC: 3.2 G/DL (ref 3.5–5.2)
ALP SERPL-CCNC: 53 U/L (ref 55–135)
ALT SERPL W/O P-5'-P-CCNC: 15 U/L (ref 10–44)
ANION GAP SERPL CALC-SCNC: 14 MMOL/L (ref 8–16)
AST SERPL-CCNC: 17 U/L (ref 10–40)
BASOPHILS # BLD AUTO: 0.02 K/UL (ref 0–0.2)
BASOPHILS NFR BLD: 0.4 % (ref 0–1.9)
BILIRUB SERPL-MCNC: 0.4 MG/DL (ref 0.1–1)
BUN SERPL-MCNC: 52 MG/DL (ref 8–23)
CALCIUM SERPL-MCNC: 8 MG/DL (ref 8.7–10.5)
CHLORIDE SERPL-SCNC: 97 MMOL/L (ref 95–110)
CO2 SERPL-SCNC: 23 MMOL/L (ref 23–29)
CREAT SERPL-MCNC: 8 MG/DL (ref 0.5–1.4)
DIFFERENTIAL METHOD BLD: ABNORMAL
EOSINOPHIL # BLD AUTO: 1 K/UL (ref 0–0.5)
EOSINOPHIL NFR BLD: 20.6 % (ref 0–8)
ERYTHROCYTE [DISTWIDTH] IN BLOOD BY AUTOMATED COUNT: 16.2 % (ref 11.5–14.5)
EST. GFR  (NO RACE VARIABLE): 4.6 ML/MIN/1.73 M^2
GLUCOSE SERPL-MCNC: 139 MG/DL (ref 70–110)
HCT VFR BLD AUTO: 36.1 % (ref 37–48.5)
HGB BLD-MCNC: 10.8 G/DL (ref 12–16)
IMM GRANULOCYTES # BLD AUTO: 0.02 K/UL (ref 0–0.04)
IMM GRANULOCYTES NFR BLD AUTO: 0.4 % (ref 0–0.5)
LYMPHOCYTES # BLD AUTO: 0.9 K/UL (ref 1–4.8)
LYMPHOCYTES NFR BLD: 18.1 % (ref 18–48)
MCH RBC QN AUTO: 26.2 PG (ref 27–31)
MCHC RBC AUTO-ENTMCNC: 29.9 G/DL (ref 32–36)
MCV RBC AUTO: 88 FL (ref 82–98)
MONOCYTES # BLD AUTO: 0.6 K/UL (ref 0.3–1)
MONOCYTES NFR BLD: 11.8 % (ref 4–15)
NEUTROPHILS # BLD AUTO: 2.3 K/UL (ref 1.8–7.7)
NEUTROPHILS NFR BLD: 48.7 % (ref 38–73)
NRBC BLD-RTO: 0 /100 WBC
PLATELET # BLD AUTO: 149 K/UL (ref 150–450)
PMV BLD AUTO: 9.8 FL (ref 9.2–12.9)
POTASSIUM SERPL-SCNC: 4.2 MMOL/L (ref 3.5–5.1)
PROT SERPL-MCNC: 5.9 G/DL (ref 6–8.4)
RBC # BLD AUTO: 4.12 M/UL (ref 4–5.4)
SODIUM SERPL-SCNC: 134 MMOL/L (ref 136–145)
WBC # BLD AUTO: 4.76 K/UL (ref 3.9–12.7)

## 2024-07-03 PROCEDURE — 80053 COMPREHEN METABOLIC PANEL: CPT | Performed by: STUDENT IN AN ORGANIZED HEALTH CARE EDUCATION/TRAINING PROGRAM

## 2024-07-03 PROCEDURE — 25000003 PHARM REV CODE 250: Performed by: INTERNAL MEDICINE

## 2024-07-03 PROCEDURE — 85025 COMPLETE CBC W/AUTO DIFF WBC: CPT | Performed by: STUDENT IN AN ORGANIZED HEALTH CARE EDUCATION/TRAINING PROGRAM

## 2024-07-03 PROCEDURE — 20000000 HC ICU ROOM

## 2024-07-03 PROCEDURE — 94761 N-INVAS EAR/PLS OXIMETRY MLT: CPT

## 2024-07-03 PROCEDURE — 99900035 HC TECH TIME PER 15 MIN (STAT)

## 2024-07-03 PROCEDURE — 90935 HEMODIALYSIS ONE EVALUATION: CPT

## 2024-07-03 PROCEDURE — 63600175 PHARM REV CODE 636 W HCPCS: Performed by: NURSE PRACTITIONER

## 2024-07-03 PROCEDURE — 27000221 HC OXYGEN, UP TO 24 HOURS

## 2024-07-03 PROCEDURE — 25000003 PHARM REV CODE 250: Performed by: STUDENT IN AN ORGANIZED HEALTH CARE EDUCATION/TRAINING PROGRAM

## 2024-07-03 PROCEDURE — 36415 COLL VENOUS BLD VENIPUNCTURE: CPT | Performed by: STUDENT IN AN ORGANIZED HEALTH CARE EDUCATION/TRAINING PROGRAM

## 2024-07-03 RX ADMIN — AMIODARONE HYDROCHLORIDE 200 MG: 200 TABLET ORAL at 03:07

## 2024-07-03 RX ADMIN — METOPROLOL TARTRATE 12.5 MG: 25 TABLET, FILM COATED ORAL at 09:07

## 2024-07-03 RX ADMIN — DORZOLAMIDE HYDROCHLORIDE AND TIMOLOL MALEATE 1 DROP: 22.3; 6.8 SOLUTION/ DROPS OPHTHALMIC at 09:07

## 2024-07-03 RX ADMIN — MUPIROCIN 1 G: 20 OINTMENT TOPICAL at 09:07

## 2024-07-03 RX ADMIN — HYDRALAZINE HYDROCHLORIDE 10 MG: 20 INJECTION INTRAMUSCULAR; INTRAVENOUS at 03:07

## 2024-07-03 RX ADMIN — LATANOPROST 1 DROP: 50 SOLUTION/ DROPS OPHTHALMIC at 09:07

## 2024-07-03 RX ADMIN — GUAIFENESIN AND DEXTROMETHORPHAN 10 ML: 100; 10 SYRUP ORAL at 09:07

## 2024-07-03 RX ADMIN — AMIODARONE HYDROCHLORIDE 200 MG: 200 TABLET ORAL at 09:07

## 2024-07-03 RX ADMIN — APIXABAN 2.5 MG: 2.5 TABLET, FILM COATED ORAL at 09:07

## 2024-07-03 RX ADMIN — GUAIFENESIN AND DEXTROMETHORPHAN 10 ML: 100; 10 SYRUP ORAL at 12:07

## 2024-07-03 RX ADMIN — GUAIFENESIN AND DEXTROMETHORPHAN 10 ML: 100; 10 SYRUP ORAL at 05:07

## 2024-07-03 RX ADMIN — MUPIROCIN 2 G: 20 OINTMENT TOPICAL at 09:07

## 2024-07-03 RX ADMIN — HYDRALAZINE HYDROCHLORIDE 10 MG: 20 INJECTION INTRAMUSCULAR; INTRAVENOUS at 04:07

## 2024-07-03 NOTE — PROGRESS NOTES
[facility]  Palliative Medicine  Progress Note    Patient Name: Tyra Isaac  MRN: [id number]  Admission Date: 6/27/2024  Hospital Length of Stay: 5 days  Code Status: Full Code   Attending Provider: Ayaz Amado MD  Consulting Provider: Tamara Barker NP  Primary Care Physician: Kalpesh Goel MD  Principal Problem:Chest pain    Patient information was obtained from patient, past medical records, ER records, and primary team.      Assessment/Plan:     Palliative Care  Goals of care, counseling/discussion  I reviewed the patient's chart and discussed the case with the patient's team.      I examined Tyra Isaac at bedside.  The patient maintains capacity for complex medical decision-making.  I spoke with pt at bedside.    She feels fatigued today. She is currently undergoing HD and is tolerating it well.     I let pt know that I remain worried that she could experience another cardiac event that resulted in death in the coming hours, days, or weeks. She verbalized understanding.     I rediscussed code status with her.  I recommended that she consider DNR. I let her know that DNR does not mean she needs to stop treatment or HD and that code status only comes into effect if she experienced cardiac or resp arrest. She wants to remain full code until she talks with her son Raffi.    I appreciate being involved in pts care. We will cont to follow along. Please reach out if I can be of any assistance.         I will follow-up with patient. Please contact us if you have any additional questions.    Subjective:     Chief Complaint:   Chief Complaint   Patient presents with    Chest Pain     Pt says she has been having pain in the center of her chest for the last two hours.  Pt sad she threw up prior to the pain.  Pt has hx of afib       HPI:   From admission HPI:  Patient is a 84-year-old female with history of atrial fibrillation on Eliquis, ESRD on dialysis Monday Wednesday Friday,  CVA presents to emergency room with complaints of chest discomfort.  Patient reports chest discomfort started earlier today lasted about 2 hours, describes it as tightness radiating from the right to left side.  Resolved prior to coming to the emergency room.  Patient reports similar symptoms in the past.  Did have episode of emesis.  Potassium 6.5 on admission, ED physician spoke with Nephrology who ordered stat dialysis, ED physician placed hyperkalemia protocol.  Troponin 39.6.  Patient being admitted for chest pain workup.    Palliative medicine consult:  Pt admitted for cp and hyperkalemia. She underwent stress test which was neg. She was cont on HD. She has experienced cardiac issues and hypotension during each HD session over the past several days. She is a full code. Palliative medicine consulted for goals of care discussion.     Hospital Course:  No notes on file    Interval History: Pt doing well today. She is tolerating HD.     Medications:  Continuous Infusions:   phenylephrine  0-5 mcg/kg/min Intravenous Continuous   Stopped at 07/01/24 1545     Scheduled Meds:   amiodarone  200 mg Oral TID    apixaban  2.5 mg Oral BID    dorzolamide-timolol 2-0.5%  1 drop Both Eyes BID    latanoprost  1 drop Both Eyes QHS    metoprolol tartrate  12.5 mg Oral BID    mupirocin   Nasal BID     PRN Meds:  Current Facility-Administered Medications:     0.9% NaCl, , Intravenous, PRN    0.9% NaCl, , Intravenous, PRN    acetaminophen, 650 mg, Oral, Q8H PRN    dextromethorphan-guaiFENesin  mg/5 ml, 10 mL, Oral, Q4H PRN    dextrose 50%, 12.5 g, Intravenous, PRN    dextrose 50%, 25 g, Intravenous, PRN    glucagon (human recombinant), 1 mg, Intramuscular, PRN    glucose, 16 g, Oral, PRN    glucose, 24 g, Oral, PRN    heparin (porcine), 5,000 Units, Intravenous, PRN    heparin (porcine), 5,000 Units, Intravenous, PRN    hydrALAZINE, 10 mg, Intravenous, Q8H PRN    magnesium oxide, 800 mg, Oral, PRN    magnesium oxide, 800 mg,  Oral, PRN    melatonin, 6 mg, Oral, Nightly PRN    ondansetron, 8 mg, Intravenous, Q8H PRN    potassium bicarbonate, 35 mEq, Oral, PRN    potassium bicarbonate, 50 mEq, Oral, PRN    potassium bicarbonate, 60 mEq, Oral, PRN    potassium, sodium phosphates, 2 packet, Oral, PRN    potassium, sodium phosphates, 2 packet, Oral, PRN    potassium, sodium phosphates, 2 packet, Oral, PRN    promethazine (PHENERGAN) 12.5 mg in 0.9% NaCl 50 mL IVPB, 12.5 mg, Intravenous, Q6H PRN    sodium chloride 0.9%, 250 mL, Intravenous, PRN    sodium chloride 0.9%, 250 mL, Intravenous, PRN    sodium chloride 0.9%, 10 mL, Intravenous, PRN    Objective:     Vital Signs (Most Recent):  Temp: 98 °F (36.7 °C) (07/03/24 1315)  Pulse: 70 (07/03/24 1400)  Resp: (!) 30 (07/03/24 1400)  BP: (!) 160/66 (07/03/24 1400)  SpO2: (!) 94 % (07/03/24 1400) Vital Signs (24h Range):  Temp:  [98 °F (36.7 °C)-98.9 °F (37.2 °C)] 98 °F (36.7 °C)  Pulse:  [58-76] 70  Resp:  [14-39] 30  SpO2:  [92 %-100 %] 94 %  BP: (140-196)/() 160/66     Weight: 48.7 kg (107 lb 5.8 oz)  Body mass index is 17.87 kg/m².       Physical Exam  Vitals and nursing note reviewed.   Constitutional:       General: She is not in acute distress.     Appearance: She is not ill-appearing.   HENT:      Mouth/Throat:      Mouth: Mucous membranes are moist.   Eyes:      General: No scleral icterus.     Pupils: Pupils are equal, round, and reactive to light.   Cardiovascular:      Rate and Rhythm: Normal rate and regular rhythm.      Pulses: Normal pulses.   Pulmonary:      Effort: Pulmonary effort is normal. No respiratory distress.   Skin:     General: Skin is warm and dry.   Neurological:      Mental Status: She is alert and oriented to person, place, and time.   Psychiatric:         Mood and Affect: Mood normal.         Behavior: Behavior normal.         Thought Content: Thought content normal.         Judgment: Judgment normal.            Review of Symptoms      Symptom Assessment (ESAS  0-10 Scale)  Pain:  0  Dyspnea:  0  Anxiety:  0  Nausea:  0  Depression:  0  Anorexia:  0  Fatigue:  7  Insomnia:  0  Restlessness:  0  Agitation:  0         Performance Status:  50    Living Arrangements:  Lives with family    Psychosocial/Cultural:   See Palliative Psychosocial Note: No  **Primary  to Follow**  Palliative Care  Consult: No        Advance Care Planning  Advance Directives:   Do Not Resuscitate Status: No      Decision Making:  Patient answered questions  Goals of Care: What is most important right now is to focus on quality of life, even if it means sacrificing a little time, curative/life-prolongation (regardless of treatment burdens). Accordingly, we have decided that the best plan to meet the patient's goals includes continuing with treatment.         Significant Labs: All pertinent labs within the past 24 hours have been reviewed.  CBC:   Recent Labs   Lab 07/03/24 0241   WBC 4.76   HGB 10.8*   HCT 36.1*   MCV 88   *     BMP:  Recent Labs   Lab 07/03/24 0241   *   *   K 4.2   CL 97   CO2 23   BUN 52*   CREATININE 8.0*   CALCIUM 8.0*     LFT:  Lab Results   Component Value Date    AST 17 07/03/2024    ALKPHOS 53 (L) 07/03/2024    BILITOT 0.4 07/03/2024     Albumin:   Albumin   Date Value Ref Range Status   07/03/2024 3.2 (L) 3.5 - 5.2 g/dL Final     Protein:   Total Protein   Date Value Ref Range Status   07/03/2024 5.9 (L) 6.0 - 8.4 g/dL Final     Lactic acid:   Lab Results   Component Value Date    LACTATE 0.7 01/04/2024    LACTATE 2.2 (HH) 01/04/2024       Significant Imaging: I have reviewed all pertinent imaging results/findings within the past 24 hours.    55 min visit spent in chart review, face to face discussion of goals of care,  symptom assessment, coordination of care and emotional support.     Tamara Barker NP  Palliative Medicine  [facility]

## 2024-07-03 NOTE — PLAN OF CARE
Problem: Adult Inpatient Plan of Care  Goal: Patient-Specific Goal (Individualized)  Outcome: Progressing  Goal: Absence of Hospital-Acquired Illness or Injury  Outcome: Progressing  Goal: Optimal Comfort and Wellbeing  Outcome: Progressing  Goal: Readiness for Transition of Care  Outcome: Progressing     Problem: Hemodialysis  Goal: Absence of Infection Signs and Symptoms  Outcome: Progressing     Problem: Skin Injury Risk Increased  Goal: Skin Health and Integrity  Outcome: Progressing     Problem: Fall Injury Risk  Goal: Absence of Fall and Fall-Related Injury  Outcome: Progressing     Problem: Dysrhythmia  Goal: Normalized Cardiac Rhythm  Outcome: Progressing     Problem: Coping Ineffective  Goal: Effective Coping  Outcome: Progressing     Problem: Wound  Goal: Optimal Coping  Outcome: Progressing  Goal: Optimal Functional Ability  Outcome: Progressing  Goal: Absence of Infection Signs and Symptoms  Outcome: Progressing  Goal: Improved Oral Intake  Outcome: Progressing  Goal: Optimal Pain Control and Function  Outcome: Progressing  Goal: Skin Health and Integrity  Outcome: Progressing  Goal: Optimal Wound Healing  Outcome: Progressing

## 2024-07-03 NOTE — PLAN OF CARE
Patient remains in need of acute care setting. Final disposition to be determined by clinical course and patient's decision. Palliative team following patient as she is considering DNR status and possible hospice. Will continue to follow and update as appropriate.

## 2024-07-03 NOTE — CARE UPDATE
07/03/24 0724   PRE-TX-O2   Device (Oxygen Therapy) nasal cannula   $ Is the patient on Low Flow Oxygen? Yes   Flow (L/min) (Oxygen Therapy) 2  (decreased to 1)   SpO2 100 %   Pulse Oximetry Type Continuous   $ Pulse Oximetry - Multiple Charge Pulse Oximetry - Multiple   Pulse 72   Resp 15   Respiratory Evaluation   $ Care Plan Tech Time 15 min

## 2024-07-03 NOTE — SUBJECTIVE & OBJECTIVE
Interval History: Pt doing well today. She is tolerating HD.     Medications:  Continuous Infusions:   phenylephrine  0-5 mcg/kg/min Intravenous Continuous   Stopped at 07/01/24 1545     Scheduled Meds:   amiodarone  200 mg Oral TID    apixaban  2.5 mg Oral BID    dorzolamide-timolol 2-0.5%  1 drop Both Eyes BID    latanoprost  1 drop Both Eyes QHS    metoprolol tartrate  12.5 mg Oral BID    mupirocin   Nasal BID     PRN Meds:  Current Facility-Administered Medications:     0.9% NaCl, , Intravenous, PRN    0.9% NaCl, , Intravenous, PRN    acetaminophen, 650 mg, Oral, Q8H PRN    dextromethorphan-guaiFENesin  mg/5 ml, 10 mL, Oral, Q4H PRN    dextrose 50%, 12.5 g, Intravenous, PRN    dextrose 50%, 25 g, Intravenous, PRN    glucagon (human recombinant), 1 mg, Intramuscular, PRN    glucose, 16 g, Oral, PRN    glucose, 24 g, Oral, PRN    heparin (porcine), 5,000 Units, Intravenous, PRN    heparin (porcine), 5,000 Units, Intravenous, PRN    hydrALAZINE, 10 mg, Intravenous, Q8H PRN    magnesium oxide, 800 mg, Oral, PRN    magnesium oxide, 800 mg, Oral, PRN    melatonin, 6 mg, Oral, Nightly PRN    ondansetron, 8 mg, Intravenous, Q8H PRN    potassium bicarbonate, 35 mEq, Oral, PRN    potassium bicarbonate, 50 mEq, Oral, PRN    potassium bicarbonate, 60 mEq, Oral, PRN    potassium, sodium phosphates, 2 packet, Oral, PRN    potassium, sodium phosphates, 2 packet, Oral, PRN    potassium, sodium phosphates, 2 packet, Oral, PRN    promethazine (PHENERGAN) 12.5 mg in 0.9% NaCl 50 mL IVPB, 12.5 mg, Intravenous, Q6H PRN    sodium chloride 0.9%, 250 mL, Intravenous, PRN    sodium chloride 0.9%, 250 mL, Intravenous, PRN    sodium chloride 0.9%, 10 mL, Intravenous, PRN    Objective:     Vital Signs (Most Recent):  Temp: 98 °F (36.7 °C) (07/03/24 1315)  Pulse: 70 (07/03/24 1400)  Resp: (!) 30 (07/03/24 1400)  BP: (!) 160/66 (07/03/24 1400)  SpO2: (!) 94 % (07/03/24 1400) Vital Signs (24h Range):  Temp:  [98 °F (36.7 °C)-98.9 °F  (37.2 °C)] 98 °F (36.7 °C)  Pulse:  [58-76] 70  Resp:  [14-39] 30  SpO2:  [92 %-100 %] 94 %  BP: (140-196)/() 160/66     Weight: 48.7 kg (107 lb 5.8 oz)  Body mass index is 17.87 kg/m².       Physical Exam  Vitals and nursing note reviewed.   Constitutional:       General: She is not in acute distress.     Appearance: She is not ill-appearing.   HENT:      Mouth/Throat:      Mouth: Mucous membranes are moist.   Eyes:      General: No scleral icterus.     Pupils: Pupils are equal, round, and reactive to light.   Cardiovascular:      Rate and Rhythm: Normal rate and regular rhythm.      Pulses: Normal pulses.   Pulmonary:      Effort: Pulmonary effort is normal. No respiratory distress.   Skin:     General: Skin is warm and dry.   Neurological:      Mental Status: She is alert and oriented to person, place, and time.   Psychiatric:         Mood and Affect: Mood normal.         Behavior: Behavior normal.         Thought Content: Thought content normal.         Judgment: Judgment normal.            Review of Symptoms      Symptom Assessment (ESAS 0-10 Scale)  Pain:  0  Dyspnea:  0  Anxiety:  0  Nausea:  0  Depression:  0  Anorexia:  0  Fatigue:  7  Insomnia:  0  Restlessness:  0  Agitation:  0         Performance Status:  50    Living Arrangements:  Lives with family    Psychosocial/Cultural:   See Palliative Psychosocial Note: No  **Primary  to Follow**  Palliative Care  Consult: No        Advance Care Planning   Advance Directives:   Do Not Resuscitate Status: No      Decision Making:  Patient answered questions  Goals of Care: What is most important right now is to focus on quality of life, even if it means sacrificing a little time, curative/life-prolongation (regardless of treatment burdens). Accordingly, we have decided that the best plan to meet the patient's goals includes continuing with treatment.         Significant Labs: All pertinent labs within the past 24 hours have been  reviewed.  CBC:   Recent Labs   Lab 07/03/24  0241   WBC 4.76   HGB 10.8*   HCT 36.1*   MCV 88   *     BMP:  Recent Labs   Lab 07/03/24  0241   *   *   K 4.2   CL 97   CO2 23   BUN 52*   CREATININE 8.0*   CALCIUM 8.0*     LFT:  Lab Results   Component Value Date    AST 17 07/03/2024    ALKPHOS 53 (L) 07/03/2024    BILITOT 0.4 07/03/2024     Albumin:   Albumin   Date Value Ref Range Status   07/03/2024 3.2 (L) 3.5 - 5.2 g/dL Final     Protein:   Total Protein   Date Value Ref Range Status   07/03/2024 5.9 (L) 6.0 - 8.4 g/dL Final     Lactic acid:   Lab Results   Component Value Date    LACTATE 0.7 01/04/2024    LACTATE 2.2 (HH) 01/04/2024       Significant Imaging: I have reviewed all pertinent imaging results/findings within the past 24 hours.

## 2024-07-03 NOTE — PROGRESS NOTES
"[facility]  Adult Nutrition   Progress Note (Initial Assessment)     SUMMARY     Recommendations  1) Continue current Renal Cardiac (Low Na/Chol); Fluid - 1500mL diet as tolerated.   2) RD recommends and has added Nepro TID to assist in meeting pts nutritional needs.    Goals:   Pt to meet 75% or more of her EEN/EPN by follow up.    Nutrition Goal Status: progressing towards goal    Communication of RD Recs: discussed on rounds    Nutrition Diagnosis PES Statement: Increased nutrition needs related to ESRD as evidenced by MD requesting pt be put on Nepro.    Dietitian Le Brief  VO from MD: Saw pt today during rounds and MD Hoyos was concerned about her PO intake so we decided to add Nepro TID to assist in meeting her nutritional needs. Will follow prn.    Nutrition Related Social Determinants of Health: SDOH: None Identified    Diet order:   Current Diet Order: Renal Cardiac (Low Na/Chol); Fluid - 1500mL      Evaluation of Received Nutrient/Fluid Intake  Energy Calories Required: not meeting needs  Protein Required: not meeting needs  Fluid Required: meeting needs  Tolerance: tolerating     % Intake of Estimated Energy Needs: 50%  % Meal Intake: 25 - 50 %      Intake/Output Summary (Last 24 hours) at 7/3/2024 1216  Last data filed at 7/3/2024 0909  Gross per 24 hour   Intake 800 ml   Output --   Net 800 ml        Anthropometrics  Temp: 98.4 °F (36.9 °C)  Height Method: Stated  Height: 5' 5" (165.1 cm)  Height (inches): 65 in  Weight Method: Bed Scale  Weight: 48.7 kg (107 lb 5.8 oz)  Weight (lb): 107.37 lb  Ideal Body Weight (IBW), Female: 125 lb  % Ideal Body Weight, Female (lb): 85.9 %  BMI (Calculated): 17.9  BMI Grade: 17 - 18.4 protein-energy malnutrition grade I       Estimated/Assessed Needs  Weight Used For Calorie Calculations: 48.7 kg (107 lb 5.8 oz)  Energy Calorie Requirements (kcal): 5058-0428 kcals/day (30-35 kcals/kg ABW)  Energy Need Method: Kcal/kg  Protein Requirements: 59-73 " g/day (1.2-1.5 g/kg ABW) CKD on HD  Weight Used For Protein Calculations: 48.7 kg (107 lb 5.8 oz)     Estimated Fluid Requirement Method: RDA Method  RDA Method (mL): 1461  CHO Requirement: 183-213 g CHO/day    Reason for Assessment  Reason For Assessment: identified at risk by screening criteria  Diagnosis: other (see comments), cardiac disease (Chest pain)  Relevant Medical History: Hypertension, Hyperlipidemia, Disorder of kidney and ureter, Anxiety, Depression, Gout, Encephalopathy acute, End stage kidney disease, Nephropathy hypertensive, stage 5 chronic kidney disease or end stage renal disease, Moderate episode of recurrent major depressive disorder, Osteopenia of multiple sites, Patient also has chronic kidney disease, Stroke, Obstructive pattern present on pulmonary function testing  Interdisciplinary Rounds: attended  Nutrition Discharge Planning: Renal Cardiac (Low Na/Chol); Fluid - 1500mL    Nutrition/Diet History  Spiritual, Cultural Beliefs, Buddhism Practices, Values that Affect Care: no  Food Allergies: fish, peanut  Factors Affecting Nutritional Intake: None identified at this time    Nutrition Risk Screen  Nutrition Risk Screen: no indicators present       Wound 07/03/24 0430 Medical adhesive related skin injury Left Back #1-Wound Image: Images linked  MST Score: 0  Have you recently lost weight without trying?: No  Weight loss score: 0  Have you been eating poorly because of a decreased appetite?: No  Appetite score: 0       Weight History:  Wt Readings from Last 10 Encounters:   06/28/24 48.7 kg (107 lb 5.8 oz)   06/28/24 48.7 kg (107 lb 5.8 oz)   06/06/24 46.7 kg (103 lb)   06/03/24 45.1 kg (99 lb 6.8 oz)   04/03/24 45.4 kg (100 lb)   03/21/24 45.8 kg (101 lb)   03/15/24 54.5 kg (120 lb 2.4 oz)   01/11/24 44 kg (97 lb)   01/04/24 49.9 kg (110 lb)   01/05/24 49.9 kg (110 lb 0.2 oz)        Lab/Procedures/Meds: Pertinent Labs/Meds Reviewed    Medications:Pertinent Medications Reviewed  Scheduled  Meds:   amiodarone  200 mg Oral TID    apixaban  2.5 mg Oral BID    dorzolamide-timolol 2-0.5%  1 drop Both Eyes BID    latanoprost  1 drop Both Eyes QHS    metoprolol tartrate  12.5 mg Oral BID    mupirocin   Nasal BID     Continuous Infusions:   phenylephrine  0-5 mcg/kg/min Intravenous Continuous   Stopped at 07/01/24 1545     PRN Meds:.  Current Facility-Administered Medications:     0.9% NaCl, , Intravenous, PRN    0.9% NaCl, , Intravenous, PRN    acetaminophen, 650 mg, Oral, Q8H PRN    dextromethorphan-guaiFENesin  mg/5 ml, 10 mL, Oral, Q4H PRN    dextrose 50%, 12.5 g, Intravenous, PRN    dextrose 50%, 25 g, Intravenous, PRN    glucagon (human recombinant), 1 mg, Intramuscular, PRN    glucose, 16 g, Oral, PRN    glucose, 24 g, Oral, PRN    heparin (porcine), 5,000 Units, Intravenous, PRN    heparin (porcine), 5,000 Units, Intravenous, PRN    hydrALAZINE, 10 mg, Intravenous, Q8H PRN    magnesium oxide, 800 mg, Oral, PRN    magnesium oxide, 800 mg, Oral, PRN    melatonin, 6 mg, Oral, Nightly PRN    ondansetron, 8 mg, Intravenous, Q8H PRN    potassium bicarbonate, 35 mEq, Oral, PRN    potassium bicarbonate, 50 mEq, Oral, PRN    potassium bicarbonate, 60 mEq, Oral, PRN    potassium, sodium phosphates, 2 packet, Oral, PRN    potassium, sodium phosphates, 2 packet, Oral, PRN    potassium, sodium phosphates, 2 packet, Oral, PRN    promethazine (PHENERGAN) 12.5 mg in 0.9% NaCl 50 mL IVPB, 12.5 mg, Intravenous, Q6H PRN    sodium chloride 0.9%, 250 mL, Intravenous, PRN    sodium chloride 0.9%, 250 mL, Intravenous, PRN    sodium chloride 0.9%, 10 mL, Intravenous, PRN    Labs: Pertinent Labs Reviewed  Clinical Chemistry:  Recent Labs   Lab 06/28/24  0018 06/28/24  0552 06/30/24  0328 06/30/24  1321 07/01/24  0235 07/02/24  0324 07/03/24  0241      < > 137 136 136 136 134*   K 6.5*   < > 5.9* 5.2* 4.6 4.2 4.2   CL 98   < > 102 100 98 99 97   CO2 30*   < > 25 24 25 25 23   GLU 90   < > 54* 106 80 64* 139*   BUN  56*   < > 38* 42* 53* 34* 52*   CREATININE 9.2*   < > 7.5* 8.1* 9.2* 6.5* 8.0*   CALCIUM 9.6   < > 8.9 8.9 8.0* 7.8* 8.0*   PROT 7.6   < > 6.9  --  6.2 5.8* 5.9*   ALBUMIN 4.3   < > 3.7 3.6 3.3* 3.1* 3.2*   BILITOT 0.4   < > 0.5  --  0.4 0.4 0.4   ALKPHOS 64   < > 59  --  52* 47* 53*   AST 21   < > 16  --  18 18 17   ALT 16   < > 12  --  13 12 15   ANIONGAP 12   < > 10 12 13 12 14   MG 2.3  --   --   --   --   --   --    PHOS  --   --  4.6* 4.6*  --   --   --     < > = values in this interval not displayed.     CBC:   Recent Labs   Lab 07/03/24  0241   WBC 4.76   RBC 4.12   HGB 10.8*   HCT 36.1*   *   MCV 88   MCH 26.2*   MCHC 29.9*     Lipid Panel:  Recent Labs   Lab 06/28/24  0553   CHOL 136   HDL 61   LDLCALC 60.2*   TRIG 74   CHOLHDL 44.9     Cardiac Profile:  Recent Labs   Lab 06/28/24  0018   *     Diabetes:  Recent Labs   Lab 06/28/24  0553   HGBA1C 5.1     Thyroid & Parathyroid:  Recent Labs   Lab 06/28/24  0553   TSH 1.151       Monitor and Evaluation  Food and Nutrient Intake: food and beverage intake, energy intake  Food and Nutrient Adminstration: diet order  Knowledge/Beliefs/Attitudes: food and nutrition knowledge/skill, beliefs and attitudes  Physical Activity and Function: nutrition-related ADLs and IADLs, factors affecting access to physical activity  Anthropometric Measurements: weight, weight change, body mass index  Biochemical Data, Medical Tests and Procedures: lipid profile, inflammatory profile, glucose/endocrine profile, gastrointestinal profile, electrolyte and renal panel  Nutrition-Focused Physical Findings: overall appearance     Nutrition Risk  Level of Risk/Frequency of Follow-up: moderate     Nutrition Follow-Up  RD Follow-up?: Yes      Zenobia Ross, JAIME 07/03/2024 12:16 PM

## 2024-07-03 NOTE — ASSESSMENT & PLAN NOTE
I reviewed the patient's chart and discussed the case with the patient's team.      I examined Tyra Isaac at bedside.  The patient maintains capacity for complex medical decision-making.  I spoke with pt at bedside.    She feels fatigued today. She is currently undergoing HD and is tolerating it well.     I let pt know that I remain worried that she could experience another cardiac event that resulted in death in the coming hours, days, or weeks. She verbalized understanding.     I rediscussed code status with her.  I recommended that she consider DNR. I let her know that DNR does not mean she needs to stop treatment or HD and that code status only comes into effect if she experienced cardiac or resp arrest. She wants to remain full code until she talks with her son Raffi.    I appreciate being involved in pts care. We will cont to follow along. Please reach out if I can be of any assistance.

## 2024-07-03 NOTE — NURSING
Nurses Note -- 4 Eyes      7/2/2024   10:08 PM      Skin assessed during: Q Shift Change      [x] No Altered Skin Integrity Present    []Prevention Measures Documented      [] Yes- Altered Skin Integrity Present or Discovered   [] LDA Added if Not in Epic (Describe Wound)   [] New Altered Skin Integrity was Present on Admit and Documented in LDA   [] Wound Image Taken    Wound Care Consulted? No    Attending Nurse: Aliyah Henao RN/Staff Member:  Delores

## 2024-07-03 NOTE — NURSING
Nurses Note -- 4 Eyes      7/3/2024   0800 AM      Skin assessed during: Daily Assessment      [] No Altered Skin Integrity Present    [x]Prevention Measures Documented  Right upper arm fistula , opened small blister on left upper back    [x] Yes- Altered Skin Integrity Present or Discovered   [] LDA Added if Not in Epic (Describe Wound)   [] New Altered Skin Integrity was Present on Admit and Documented in LDA   [x] Wound Image Taken    Wound Care Consulted? No    Attending Nurse:  Kelle Henao RN/Staff Member:  AMY Morgan

## 2024-07-03 NOTE — PROGRESS NOTES
1500 ml net uf removed   07/03/24 1315   Required for all Hemodialysis Patients   Hepatitis Status negative   Handoff Report   Received From [facility]   Given To Kelle   Treatment Type   Treatment Type Maintenance   Vital Signs   Temp 98 °F (36.7 °C)   Temp Source Oral   Pulse 64   Resp (!) 34   SpO2 100 %   BP (!) 181/81   MAP (mmHg) 117   BP Location Left arm   BP Method Automatic   Patient Position Lying   Assessments (Pre/Post)   Safety vein preservation armband present   Date Hepatitis Profile Obtained 11/10/23   Blood Liters Processed (BLP) 52   Transport Modality bed   Level of Consciousness (AVPU) alert   Dialyzer Clearance mildly streaked   Pain   Preferred Pain Scale number (Numeric Rating Pain Scale)   Pain Rating (0-10): Rest 0   Pre-Hemodialysis Assessment   Patient Status Departed        Hemodialysis AV Fistula Right upper arm   No Placement Date or Time found.   Present Prior to Hospital Arrival?: Yes  Location: Right upper arm   Site Assessment Clean;Dry;Intact   Patency Present;Thrill;Bruit   Status Deaccessed   Flows Good   Dressing Status Clean;Dry;Intact   Site Condition No complications   Dressing Gauze   Post-Hemodialysis Assessment   Rinseback Volume (mL) 250 mL   Blood Volume Processed (Liters) 52 L   Dialyzer Clearance Lightly streaked   Duration of Treatment 180 minutes   Additional Fluid Intake (mL) 500 mL   Total UF (mL) 2000 mL   Net Fluid Removal 1500   Patient Response to Treatment tolerated well   Post-Treatment Weight 47.2 kg (104 lb 0.9 oz)   Treatment Weight Change -1.5   Arterial bleeding stop time (min) 5 min   Venous bleeding stop time (min) 5 min   Post-Hemodialysis Comments tx completed     Educated on hd tx

## 2024-07-03 NOTE — PLAN OF CARE
Problem: Skin Injury Risk Increased  Goal: Skin Health and Integrity  Intervention: Promote and Optimize Oral Intake  Flowsheets (Taken 7/3/2024 1152)  Oral Nutrition Promotion: calorie-dense liquids provided     Problem: Oral Intake Inadequate  Goal: Improved Oral Intake  Outcome: Progressing  Intervention: Promote and Optimize Oral Intake  Flowsheets (Taken 7/3/2024 1152)  Oral Nutrition Promotion: calorie-dense liquids provided

## 2024-07-03 NOTE — PT/OT/SLP PROGRESS
Physical Therapy      Patient Name:  Tyra Isaac   MRN:  [id number]    Patient not seen today secondary to Dialysis. Will follow-up next visit date.

## 2024-07-03 NOTE — PROGRESS NOTES
INPATIENT NEPHROLOGY Progress  [facility] NEPHROLOGY    Tyra Isaac  07/03/2024    Reason for consultation:    esrd    Chief Complaint:   Chief Complaint   Patient presents with    Chest Pain     Pt says she has been having pain in the center of her chest for the last two hours.  Pt sad she threw up prior to the pain.  Pt has hx of afib          History of Present Illness:    Per H and P   Patient is a 84-year-old female with history of atrial fibrillation on Eliquis, ESRD on dialysis Monday Wednesday Friday, CVA presents to emergency room with complaints of chest discomfort.  Patient reports chest discomfort started earlier today lasted about 2 hours, describes it as tightness radiating from the right to left side.  Resolved prior to coming to the emergency room.  Patient reports similar symptoms in the past.  Did have episode of emesis.  Potassium 6.5 on admission, ED physician spoke with Nephrology who ordered stat dialysis, ED physician placed hyperkalemia protocol.  Troponin 39.6.  Patient being admitted for chest pain workup.     6/28  Patient seen on hemodialysis for uremic clearance and ultrafiltration.    Cp resolved.  No sob.    Addendum:  --about 30 minutes after I saw her she had a syncopal episode.  Her blood sugar was 90.   Her blood was rinsed back.   She never became pulseless.  She woke up after the blood was returned.  She briefly had hypotension.  Her bp came up to 150s/80s.   She woke up and was cognitive and appropriate.  No chest pain or sob.   She received about an hour and a half of dialysis  6/29  665cc off w/HD yesterday.  VSS, no chemistry results.  LADI for stress test, then will have dialysis--only got about an hour and a half of yesterday's tx.  6/30 nuclear stress test neg- went into SVT during last 30min of HD so treatment terminated with rapid response called- patient given metoprolol IV, diltiazem IV and 250cc NS bolus and moved to ICU- got off 800cc UF yest- HR < 100, BP  stable, on RA- started on oral beta blocker    7/1  AFebrile, intermittent hypotension.  C/o coughing more today  7/2  back in SR.  Dialysis stopped short yesterday due to Afib with RVR and hemodynamic instability.  She was placed on phenylephrine for vasopressor  support (now stopped) and an amiodarone drip for antiarrhythmic support.     7/3  Patient seen on hemodialysis for uremic clearance and ultrafiltration.           Plan of Care:     ESRD on HD MWF  Syncope  Chest pain  AF/SVT  Elevated trop- demand ischemia  HTN  Hyperkalemia  SHPT  Anemia of CKD  Hypoglycemia  pCRX      - continue HD MWF.  If she continues to become unstable during her treatments dialysis will need to be held.    - syncope- hypoglycemia- management per   - chest pain- nuclear stress test neg  - cardiology started a low dose beta blocker- monitor for bradycardia  - trend troponin- patient may not be able to afford eliquis- nurse looking into it  - BP controlled- UF is very challenging due to hemodynamic instability- will have TBD goal on HD days right now- liberalize fluids to 1.5L since there may be a component of volume depletion  - hyperK noted this AM- got kayexalate- repeat renal at 2pm- on renal diet  - rechecked phos--at goal  - no acute BRAYAN needs  - BG is low- defer management to     Patient target weight 46kg- usually only pulled down to 46.5.  Looks at [facility] flow sheets- barely take off 500-1L per treatment.  Will go ahead and change dry weight to 47kg for now and reassess.    Discharge pending medication acquisition and repeat renal panel to f/u high K. Also need to make sure she doesn't remain hypoglycemic.    Thank you for allowing us to participate in this patient's care. We will continue to follow.    Vital Signs:  Temp Readings from Last 3 Encounters:   07/03/24 98 °F (36.7 °C) (Oral)   06/03/24 98.5 °F (36.9 °C) (Oral)   03/19/24 97.4 °F (36.3 °C) (Tympanic)       Pulse Readings from Last 3 Encounters:   07/03/24 67    06/06/24 71   06/03/24 69       BP Readings from Last 3 Encounters:   07/03/24 (!) 161/72   06/06/24 (!) 122/56   06/03/24 130/60       Weight:  Wt Readings from Last 3 Encounters:   06/28/24 48.7 kg (107 lb 5.8 oz)   06/28/24 48.7 kg (107 lb 5.8 oz)   06/06/24 46.7 kg (103 lb)     Medications:  No current facility-administered medications on file prior to encounter.     Current Outpatient Medications on File Prior to Encounter   Medication Sig Dispense Refill    atorvastatin (LIPITOR) 40 MG tablet Take 40 mg by mouth once daily.      b complex vitamins tablet Take 1 tablet by mouth once daily.      calcium acetate,phosphat bind, (PHOSLO) 667 mg capsule Take 667 mg by mouth 3 (three) times daily with meals.      dorzolamide-timolol 2-0.5% (COSOPT) 22.3-6.8 mg/mL ophthalmic solution Place 1 drop into both eyes 2 (two) times daily.      dronedarone (MULTAQ) 400 mg Tab Take 1 tablet (400 mg total) by mouth 2 (two) times daily with meals. 180 tablet 0    ELIQUIS 2.5 mg Tab Take 2.5 mg by mouth 2 (two) times daily.      latanoprost 0.005 % ophthalmic solution Place 1 drop into both eyes every evening.      loperamide (IMODIUM A-D) 2 mg Tab Take 1 tablet (2 mg total) by mouth 4 (four) times daily as needed (diarrhea). 3 tablet 0    midodrine (PROAMATINE) 10 MG tablet Take 1 tablet (10 mg total) by mouth 3 (three) times daily with meals. 270 tablet 0    ondansetron (ZOFRAN-ODT) 4 MG TbDL Take 1 tablet (4 mg total) by mouth every 6 (six) hours as needed (nausea). 30 tablet 5     Scheduled Meds:   amiodarone  200 mg Oral TID    apixaban  2.5 mg Oral BID    dorzolamide-timolol 2-0.5%  1 drop Both Eyes BID    latanoprost  1 drop Both Eyes QHS    metoprolol tartrate  12.5 mg Oral BID    mupirocin   Nasal BID     Continuous Infusions:   phenylephrine  0-5 mcg/kg/min Intravenous Continuous   Stopped at 07/01/24 9156     PRN Meds:.  Current Facility-Administered Medications:     0.9% NaCl, , Intravenous, PRN    0.9% NaCl, ,  "Intravenous, PRN    acetaminophen, 650 mg, Oral, Q8H PRN    dextromethorphan-guaiFENesin  mg/5 ml, 10 mL, Oral, Q4H PRN    dextrose 50%, 12.5 g, Intravenous, PRN    dextrose 50%, 25 g, Intravenous, PRN    glucagon (human recombinant), 1 mg, Intramuscular, PRN    glucose, 16 g, Oral, PRN    glucose, 24 g, Oral, PRN    heparin (porcine), 5,000 Units, Intravenous, PRN    heparin (porcine), 5,000 Units, Intravenous, PRN    hydrALAZINE, 10 mg, Intravenous, Q8H PRN    magnesium oxide, 800 mg, Oral, PRN    magnesium oxide, 800 mg, Oral, PRN    melatonin, 6 mg, Oral, Nightly PRN    ondansetron, 8 mg, Intravenous, Q8H PRN    potassium bicarbonate, 35 mEq, Oral, PRN    potassium bicarbonate, 50 mEq, Oral, PRN    potassium bicarbonate, 60 mEq, Oral, PRN    potassium, sodium phosphates, 2 packet, Oral, PRN    potassium, sodium phosphates, 2 packet, Oral, PRN    potassium, sodium phosphates, 2 packet, Oral, PRN    promethazine (PHENERGAN) 12.5 mg in 0.9% NaCl 50 mL IVPB, 12.5 mg, Intravenous, Q6H PRN    sodium chloride 0.9%, 250 mL, Intravenous, PRN    sodium chloride 0.9%, 250 mL, Intravenous, PRN    sodium chloride 0.9%, 10 mL, Intravenous, PRN    Review of Systems:  Neg    Physical Exam:    BP (!) 161/72 (BP Location: Left arm, Patient Position: Lying)   Pulse 67   Temp 98 °F (36.7 °C) (Oral)   Resp 20   Ht 5' 5" (1.651 m)   Wt 48.7 kg (107 lb 5.8 oz)   SpO2 97%   BMI 17.87 kg/m²     General Appearance:    Alert, cooperative, no distress, appears stated age   Head:    Normocephalic, without obvious abnormality, atraumatic   Eyes:    PER, conjunctiva/corneas clear, EOM's intact in both eyes        Throat:   Lips, mucosa, and tongue normal; teeth and gums normal   Back:     Symmetric, no curvature, ROM normal, no CVA tenderness   Lungs:     Clear to auscultation bilaterally, respirations unlabored   Chest wall:    No tenderness or deformity   Heart:    Regular rate and rhythm, S1 and S2 normal, no murmur, rub   or " gallop   Abdomen:     Soft, non-tender, bowel sounds active all four quadrants,     no masses, no organomegaly   Extremities:   Extremities normal, atraumatic, no cyanosis or edema   Pulses:   2+ and symmetric all extremities   MSK:   No joint or muscle swelling, tenderness or deformity   Skin:   Skin color, texture, turgor normal, no rashes or lesions   Neurologic:   CNII-XII intact, normal strength and sensation       Throughout.  No flap     Results:  Lab Results   Component Value Date     (L) 07/03/2024    K 4.2 07/03/2024    CL 97 07/03/2024    CO2 23 07/03/2024    BUN 52 (H) 07/03/2024    CREATININE 8.0 (H) 07/03/2024    CALCIUM 8.0 (L) 07/03/2024    ANIONGAP 14 07/03/2024    ESTGFRAFRICA 4.7 (A) 02/08/2022    EGFRNONAA 4.0 (A) 02/08/2022       Lab Results   Component Value Date    CALCIUM 8.0 (L) 07/03/2024    PHOS 4.6 (H) 06/30/2024       Recent Labs   Lab 07/03/24  0241   WBC 4.76   RBC 4.12   HGB 10.8*   HCT 36.1*   *   MCV 88   MCH 26.2*   MCHC 29.9*        Imaging Results              X-Ray Chest AP Portable (Final result)  Result time 06/28/24 01:10:09      Final result by Aldo Espinosa MD (06/28/24 01:10:09)                   Impression:      Radiographic findings as above.      Electronically signed by: Aldo Espinosa  Date:    06/28/2024  Time:    01:10               Narrative:    EXAMINATION:  XR CHEST AP PORTABLE    CLINICAL HISTORY:  Chest Pain;    TECHNIQUE:  Single frontal view of the chest was performed.    COMPARISON:  Prior examinations, most recent of which is March 19, 2024    FINDINGS:  Single portable chest view is submitted.  When accounting for position and technique and depth of inspiration the appearance of the cardiomediastinal silhouette is stable.  Aortic atherosclerotic change and mild tortuosity is noted.    Mild accentuation of interstitial markings may relate to a pattern of mild interstitial infiltrate/edema.  There is no focal consolidation, there is no  pleural effusion or pneumothorax.  Skin folds overlie the patient.    The osseous structures demonstrate chronic change.                        Wet Read by Jacquie Wang MD (06/28/24 00:32:39, [facility] - Emergency Dept, Emergency Medicine)    No pulmonary infiltrates no pulmonary edema no cardiomegaly no pleural effusion                                    Patient care was time spent personally by me on the following activities: > 35 min  Obtaining a history  Examination of patient.  Providing medical care at the patients bedside.  Developing a treatment plan with patient or surrogate and bedside caregivers  Ordering and reviewing laboratory studies, radiographic studies, pulse oximetry.  Ordering and performing treatments and interventions.  Evaluation of patient's response to treatment.  Discussions with consultants while on the unit and immediately available to the patient.  Re-evaluation of the patient's condition.  Documentation in the medical record.       I have personally reviewed pertinent radiological imaging and reports.    Jimenez Valero MD    [facility] [place]  [phone]

## 2024-07-03 NOTE — RESPIRATORY THERAPY
07/02/24 1953   Patient Assessment/Suction   Level of Consciousness (AVPU) alert   Respiratory Effort Normal;Unlabored   Expansion/Accessory Muscles/Retractions no use of accessory muscles;no retractions   All Lung Fields Breath Sounds Anterior:;clear   Rhythm/Pattern, Respiratory no shortness of breath reported;unlabored   Cough Frequency infrequent   Cough Type fair;nonproductive   PRE-TX-O2   Device (Oxygen Therapy) room air   SpO2 99 %   Pulse Oximetry Type Continuous   $ Pulse Oximetry - Multiple Charge Pulse Oximetry - Multiple   Pulse 64   Resp (!) 28   Positioning HOB elevated 30 degrees   Education   $ Education 15 min;Other (see comment)

## 2024-07-04 LAB
ALBUMIN SERPL BCP-MCNC: 3.4 G/DL (ref 3.5–5.2)
ALP SERPL-CCNC: 56 U/L (ref 55–135)
ALT SERPL W/O P-5'-P-CCNC: 13 U/L (ref 10–44)
ANION GAP SERPL CALC-SCNC: 13 MMOL/L (ref 8–16)
AST SERPL-CCNC: 15 U/L (ref 10–40)
BASOPHILS # BLD AUTO: 0.02 K/UL (ref 0–0.2)
BASOPHILS NFR BLD: 0.4 % (ref 0–1.9)
BILIRUB SERPL-MCNC: 0.5 MG/DL (ref 0.1–1)
BUN SERPL-MCNC: 34 MG/DL (ref 8–23)
CALCIUM SERPL-MCNC: 8.5 MG/DL (ref 8.7–10.5)
CHLORIDE SERPL-SCNC: 101 MMOL/L (ref 95–110)
CO2 SERPL-SCNC: 22 MMOL/L (ref 23–29)
CREAT SERPL-MCNC: 6.5 MG/DL (ref 0.5–1.4)
DIFFERENTIAL METHOD BLD: ABNORMAL
EOSINOPHIL # BLD AUTO: 0.5 K/UL (ref 0–0.5)
EOSINOPHIL NFR BLD: 11.7 % (ref 0–8)
ERYTHROCYTE [DISTWIDTH] IN BLOOD BY AUTOMATED COUNT: 16.4 % (ref 11.5–14.5)
EST. GFR  (NO RACE VARIABLE): 5.9 ML/MIN/1.73 M^2
GLUCOSE SERPL-MCNC: 72 MG/DL (ref 70–110)
HCT VFR BLD AUTO: 40.2 % (ref 37–48.5)
HGB BLD-MCNC: 12.1 G/DL (ref 12–16)
IMM GRANULOCYTES # BLD AUTO: 0.01 K/UL (ref 0–0.04)
IMM GRANULOCYTES NFR BLD AUTO: 0.2 % (ref 0–0.5)
LYMPHOCYTES # BLD AUTO: 1.1 K/UL (ref 1–4.8)
LYMPHOCYTES NFR BLD: 23.1 % (ref 18–48)
MCH RBC QN AUTO: 26.1 PG (ref 27–31)
MCHC RBC AUTO-ENTMCNC: 30.1 G/DL (ref 32–36)
MCV RBC AUTO: 87 FL (ref 82–98)
MONOCYTES # BLD AUTO: 0.8 K/UL (ref 0.3–1)
MONOCYTES NFR BLD: 18.1 % (ref 4–15)
NEUTROPHILS # BLD AUTO: 2.1 K/UL (ref 1.8–7.7)
NEUTROPHILS NFR BLD: 46.5 % (ref 38–73)
NRBC BLD-RTO: 0 /100 WBC
PLATELET # BLD AUTO: 182 K/UL (ref 150–450)
PMV BLD AUTO: 10.1 FL (ref 9.2–12.9)
POTASSIUM SERPL-SCNC: 4.4 MMOL/L (ref 3.5–5.1)
PROT SERPL-MCNC: 6.4 G/DL (ref 6–8.4)
RBC # BLD AUTO: 4.63 M/UL (ref 4–5.4)
SODIUM SERPL-SCNC: 136 MMOL/L (ref 136–145)
WBC # BLD AUTO: 4.54 K/UL (ref 3.9–12.7)

## 2024-07-04 PROCEDURE — 12000002 HC ACUTE/MED SURGE SEMI-PRIVATE ROOM

## 2024-07-04 PROCEDURE — 99900035 HC TECH TIME PER 15 MIN (STAT)

## 2024-07-04 PROCEDURE — 25000003 PHARM REV CODE 250: Performed by: INTERNAL MEDICINE

## 2024-07-04 PROCEDURE — 94761 N-INVAS EAR/PLS OXIMETRY MLT: CPT

## 2024-07-04 PROCEDURE — 36415 COLL VENOUS BLD VENIPUNCTURE: CPT | Performed by: STUDENT IN AN ORGANIZED HEALTH CARE EDUCATION/TRAINING PROGRAM

## 2024-07-04 PROCEDURE — 85025 COMPLETE CBC W/AUTO DIFF WBC: CPT | Performed by: STUDENT IN AN ORGANIZED HEALTH CARE EDUCATION/TRAINING PROGRAM

## 2024-07-04 PROCEDURE — 25000003 PHARM REV CODE 250: Performed by: STUDENT IN AN ORGANIZED HEALTH CARE EDUCATION/TRAINING PROGRAM

## 2024-07-04 PROCEDURE — 80053 COMPREHEN METABOLIC PANEL: CPT | Performed by: STUDENT IN AN ORGANIZED HEALTH CARE EDUCATION/TRAINING PROGRAM

## 2024-07-04 RX ADMIN — AMIODARONE HYDROCHLORIDE 200 MG: 200 TABLET ORAL at 08:07

## 2024-07-04 RX ADMIN — DORZOLAMIDE HYDROCHLORIDE AND TIMOLOL MALEATE 1 DROP: 22.3; 6.8 SOLUTION/ DROPS OPHTHALMIC at 08:07

## 2024-07-04 RX ADMIN — LATANOPROST 1 DROP: 50 SOLUTION/ DROPS OPHTHALMIC at 08:07

## 2024-07-04 RX ADMIN — APIXABAN 2.5 MG: 2.5 TABLET, FILM COATED ORAL at 08:07

## 2024-07-04 RX ADMIN — GUAIFENESIN AND DEXTROMETHORPHAN 10 ML: 100; 10 SYRUP ORAL at 08:07

## 2024-07-04 RX ADMIN — METOPROLOL TARTRATE 12.5 MG: 25 TABLET, FILM COATED ORAL at 08:07

## 2024-07-04 RX ADMIN — AMIODARONE HYDROCHLORIDE 200 MG: 200 TABLET ORAL at 02:07

## 2024-07-04 RX ADMIN — MUPIROCIN 1 G: 20 OINTMENT TOPICAL at 08:07

## 2024-07-04 NOTE — PROGRESS NOTES
[facility] Medicine  Progress Note    Patient Name: Tyra Isaac  MRN: [id number]  Patient Class: IP- Inpatient   Admission Date: 6/27/2024  Length of Stay: 5 days  Attending Physician: Ayaz Amado MD  Primary Care Provider: Kalpesh Goel MD        Subjective:     Principal Problem:Chest pain        HPI:  Patient is a 84-year-old female with history of atrial fibrillation on Eliquis, ESRD on dialysis Monday Wednesday Friday, CVA presents to emergency room with complaints of chest discomfort.  Patient reports chest discomfort started earlier today lasted about 2 hours, describes it as tightness radiating from the right to left side.  Resolved prior to coming to the emergency room.  Patient reports similar symptoms in the past.  Did have episode of emesis.  Potassium 6.5 on admission, ED physician spoke with Nephrology who ordered stat dialysis, ED physician placed hyperkalemia protocol.  Troponin 39.6.  Patient being admitted for chest pain workup.    Overview/Hospital Course:  Patient was admitted with chest pain and hyperkalemia.  Her EKG was nonischemic and her troponins were noted to be elevated, which is baseline for her 2/2 ESRD. She remained chest pain free during her stay.  Nephrology was consulted for routine dialysis.  She was maintained on telemetry and cardiology was consulted.  She received medications to shift her potassium and dialysis was ordered.  Her dialysis was cut short due to syncopal episode with hypotension.  She was monitored overnight.  She went for stress testing, which was negative for reversible ischemia.  Dialysis was performed the next day and had to be stopped secondary to SVT.   During hospital stay patient got syncopal episode during dialysis and it was stopped.  Cardiology reviewed and stress test ordered and came back negative.  She had another episode of severe tachycardia after dialysis and needed Lopressor and Cardizem IV and moved to ICU.   Later beta-blocker added(initially patient was on beta-blocker for unsure reason possible hypotension) rate and blood pressure better controlled and downgraded.  Code status discussed in detail with the patient and she agreed for consultation to palliative care, however patient wants to remain full code.     Patient continue to have episodes of hypotension during dialysis. On 7/1 during dialysis patient became tachycardic and hypotensive in afib RVR. She was cardioverted and started on amio drip and phenylephrine drip. Patient is off pressors this morning. She is considering to become DNR, states she wants to talk with family. Amio drip was changed to PO.        Interval History:  no major changes in condition.  Heart rate controlled today.  On oral amiodarone.    Review of Systems   Cardiovascular:  Negative for chest pain.     Objective:     Vital Signs (Most Recent):  Temp: 98 °F (36.7 °C) (07/03/24 1901)  Pulse: 72 (07/03/24 1901)  Resp: (!) 27 (07/03/24 1901)  BP: (!) 145/65 (07/03/24 1901)  SpO2: 97 % (07/03/24 1901) Vital Signs (24h Range):  Temp:  [98 °F (36.7 °C)-98.5 °F (36.9 °C)] 98 °F (36.7 °C)  Pulse:  [58-76] 72  Resp:  [14-39] 27  SpO2:  [92 %-100 %] 97 %  BP: (140-196)/() 145/65     Weight: 48.7 kg (107 lb 5.8 oz)  Body mass index is 17.87 kg/m².    Intake/Output Summary (Last 24 hours) at 7/3/2024 2030  Last data filed at 7/3/2024 1724  Gross per 24 hour   Intake 1325 ml   Output 2000 ml   Net -675 ml         Physical Exam  Constitutional:       General: She is not in acute distress.     Appearance: She is not ill-appearing.   Eyes:      General:         Right eye: No discharge.         Left eye: No discharge.   Neck:      Vascular: No JVD.   Cardiovascular:      Rate and Rhythm: Normal rate and regular rhythm.   Pulmonary:      Effort: Pulmonary effort is normal.      Breath sounds: Normal breath sounds.   Abdominal:      General: Abdomen is flat. Bowel sounds are normal. There is no  distension.      Palpations: Abdomen is soft.      Tenderness: There is no abdominal tenderness.   Musculoskeletal:      Right lower leg: No edema.      Left lower leg: No edema.   Skin:     General: Skin is warm and moist.      Findings: No rash.   Neurological:      Mental Status: She is alert.   Psychiatric:         Mood and Affect: Affect is flat.             Significant Labs: All pertinent labs within the past 24 hours have been reviewed.    Significant Imaging:  No new imaging    Assessment/Plan:      * Chest pain  Cardiac work up negative   Troponin 39.6 --> 61.9  Echo show normal EF   Stress test  negative for reversible ischemia  Lopressor  Cardiologist consulting.          Demand ischemia of myocardium  Noted.      Severe mitral valve stenosis  Echocardiogram with evidence of mitral stenosis that is severe . The patient's most recent echocardiogram result is listed below. Cardiology consulting with us.  Calcification is quite pronounced, so she wouldn't be a candidate for balloon valvuloplasty, and her comorbidities make her a poor candidate for open heart surgery.    Echo    Result Date: 6/28/2024    Left Ventricle: The left ventricle is normal in size. Normal wall   thickness. There is concentric remodeling. There is normal systolic   function with a visually estimated ejection fraction of 60 - 65%. Biplane   (2D) method of discs ejection fraction is 64%.    Right Ventricle: Normal right ventricular cavity size. Wall thickness   is normal. Systolic function is normal.    Left Atrium: Left atrium is mildly dilated.    Aortic Valve: The aortic valve is a trileaflet valve. Mildly calcified   left, right and noncoronary cusps. Mildly restricted motion. There is mild   stenosis. Aortic valve area by VTI is 1.80 cm². Aortic valve peak velocity   is 1.22 m/s. Mean gradient is 3 mmHg. The dimensionless index is 0.79.    Mitral Valve: There is moderate mitral annular calcification present.   There is severe  stenosis. The mean pressure gradient across the mitral   valve is 9 mmHg at a heart rate of  bpm. There is mild regurgitation with   a centrally directed jet.    Goals of care, counseling/discussion  Palliative care following, patient considering her options for code status.  Hasn't decided yet, either way.    Hyperkalemia  This patient has hyperkalemia which is controlled. We will monitor for arrhythmias with EKG or continuous telemetry.  The likely etiology of the hyperkalemia is ESRD.  The patients latest potassium has been reviewed and the results are listed below  Recent Labs   Lab 07/03/24  0241   K 4.2     Management per dialysis     ESRD (end stage renal disease)  BMP reviewed- noted Estimated Creatinine Clearance: 4 mL/min (A) (based on SCr of 8 mg/dL (H)). according to latest data. Based on current GFR, CKD stage is end stage.  Renally dose meds.     Nephrology follow up for HD, patient seems to be not tolerating HD with episodes of hypotension and tachyarrhythmia. Palliative care has been consulted.     Paroxysmal atrial fibrillation  Cardiology following.   Cont lopressor  and amiodarone.  Monitor rhythm on monitor.      Anemia associated with stage 5 chronic renal failure  Patient's anemia is currently controlled. Has not received any PRBCs to date. Etiology likely d/t chronic disease due to Chronic Kidney Disease  Current CBC reviewed-   Lab Results   Component Value Date    HGB 10.8 (L) 07/03/2024    HCT 36.1 (L) 07/03/2024     Monitor serial CBC and transfuse if patient becomes hemodynamically unstable, symptomatic or H/H drops below 7/21.    Syncope and collapse  It's secondary to drops in blood pressure, particularly during dialysis.  In addition, she's had tachyarrhythmias while on the machine.  Cardiology consulting.  Continue to monitor vital signs closely.  Will she continue to have these problems during dialysis?  Should she consider stopping treatments and going with comfort-only measures?   Palliative care team following.  So far, patient wishes to continue getting routine dialysis.      VTE Risk Mitigation (From admission, onward)           Ordered     heparin (porcine) injection 5,000 Units  As needed (PRN)         06/29/24 1908     heparin (porcine) injection 5,000 Units  As needed (PRN)         06/29/24 1908     apixaban tablet 2.5 mg  2 times daily         06/28/24 0207     IP VTE HIGH RISK PATIENT  Once         06/28/24 0207     Place sequential compression device  Until discontinued         06/28/24 0207                    Discharge Planning   ILAN: 7/5/2024     Code Status: Full Code   Is the patient medically ready for discharge?:     Reason for patient still in hospital (select all that apply): Patient trending condition and Treatment  Discharge Plan A: Home Health   Discharge Delays: None known at this time          Ayaz Amado MD  Department of Hospital Medicine   [facility]

## 2024-07-04 NOTE — SUBJECTIVE & OBJECTIVE
Interval History:  no major changes in condition.  Heart rate controlled today.  On oral amiodarone.    Review of Systems   Cardiovascular:  Negative for chest pain.     Objective:     Vital Signs (Most Recent):  Temp: 98 °F (36.7 °C) (07/03/24 1901)  Pulse: 72 (07/03/24 1901)  Resp: (!) 27 (07/03/24 1901)  BP: (!) 145/65 (07/03/24 1901)  SpO2: 97 % (07/03/24 1901) Vital Signs (24h Range):  Temp:  [98 °F (36.7 °C)-98.5 °F (36.9 °C)] 98 °F (36.7 °C)  Pulse:  [58-76] 72  Resp:  [14-39] 27  SpO2:  [92 %-100 %] 97 %  BP: (140-196)/() 145/65     Weight: 48.7 kg (107 lb 5.8 oz)  Body mass index is 17.87 kg/m².    Intake/Output Summary (Last 24 hours) at 7/3/2024 2030  Last data filed at 7/3/2024 1724  Gross per 24 hour   Intake 1325 ml   Output 2000 ml   Net -675 ml         Physical Exam  Constitutional:       General: She is not in acute distress.     Appearance: She is not ill-appearing.   Eyes:      General:         Right eye: No discharge.         Left eye: No discharge.   Neck:      Vascular: No JVD.   Cardiovascular:      Rate and Rhythm: Normal rate and regular rhythm.   Pulmonary:      Effort: Pulmonary effort is normal.      Breath sounds: Normal breath sounds.   Abdominal:      General: Abdomen is flat. Bowel sounds are normal. There is no distension.      Palpations: Abdomen is soft.      Tenderness: There is no abdominal tenderness.   Musculoskeletal:      Right lower leg: No edema.      Left lower leg: No edema.   Skin:     General: Skin is warm and moist.      Findings: No rash.   Neurological:      Mental Status: She is alert.   Psychiatric:         Mood and Affect: Affect is flat.             Significant Labs: All pertinent labs within the past 24 hours have been reviewed.    Significant Imaging:  No new imaging

## 2024-07-04 NOTE — ASSESSMENT & PLAN NOTE
It's secondary to drops in blood pressure, particularly during dialysis.  In addition, she's had tachyarrhythmias while on the machine.  Cardiology consulting.  Continue to monitor vital signs closely.  Will she continue to have these problems during dialysis?  Should she consider stopping treatments and going with comfort-only measures?  Palliative care team following.  So far, patient wishes to continue getting routine dialysis.

## 2024-07-04 NOTE — ASSESSMENT & PLAN NOTE
BMP reviewed- noted Estimated Creatinine Clearance: 4 mL/min (A) (based on SCr of 8 mg/dL (H)). according to latest data. Based on current GFR, CKD stage is end stage.  Renally dose meds.     Nephrology follow up for HD, patient seems to be not tolerating HD with episodes of hypotension and tachyarrhythmia. Palliative care has been consulted.

## 2024-07-04 NOTE — ASSESSMENT & PLAN NOTE
Patient's anemia is currently controlled. Has not received any PRBCs to date. Etiology likely d/t chronic disease due to Chronic Kidney Disease  Current CBC reviewed-   Lab Results   Component Value Date    HGB 10.8 (L) 07/03/2024    HCT 36.1 (L) 07/03/2024     Monitor serial CBC and transfuse if patient becomes hemodynamically unstable, symptomatic or H/H drops below 7/21.

## 2024-07-04 NOTE — NURSING TRANSFER
Nursing Transfer Note      7/4/2024   12:58 PM    Nurse giving handoff:KASH Owen RN   Nurse receiving handoff:AMY Delgado    Reason patient is being transferred: Stable    Transfer To: 1118    Transfer via wheelchair    Transfer with cardiac monitoring    Transported by KASH Owen RN    Transfer Vital Signs:  Blood Pressure:155/70  Heart Rate:58  O2:97  Temperature:98.1  Respirations:21    Telemetry: Rhythm SB  Order for Tele Monitor? Yes    Additional Lines:     4eyes on Skin: yes    Medicines sent: Medihoney, eye drops x2    Any special needs or follow-up needed: fall precautions    Patient belongings transferred with patient: Yes    Chart send with patient: Yes    Notified: daughter Becky    Patient reassessed at: 7/4/2024 1200  1  Upon arrival to floor: cardiac monitor applied, patient oriented to room, call bell in reach, and bed in lowest position  AMY Delgado in room upon arrival.

## 2024-07-04 NOTE — ASSESSMENT & PLAN NOTE
Cardiac work up negative   Troponin 39.6 --> 61.9  Echo show normal EF   Stress test  negative for reversible ischemia  Lopressor  Cardiologist consulting.

## 2024-07-04 NOTE — PROGRESS NOTES
INPATIENT NEPHROLOGY [facility]  [facility] NEPHROLOGY    Tyra Isaac  07/04/2024    Reason for consultation:    esrd    Chief Complaint:   Chief Complaint   Patient presents with    Chest Pain     Pt says she has been having pain in the center of her chest for the last two hours.  Pt sad she threw up prior to the pain.  Pt has hx of afib          History of Present Illness:    Per H and P   Patient is a 84-year-old female with history of atrial fibrillation on Eliquis, ESRD on dialysis Monday Wednesday Friday, CVA presents to emergency room with complaints of chest discomfort.  Patient reports chest discomfort started earlier today lasted about 2 hours, describes it as tightness radiating from the right to left side.  Resolved prior to coming to the emergency room.  Patient reports similar symptoms in the past.  Did have episode of emesis.  Potassium 6.5 on admission, ED physician spoke with Nephrology who ordered stat dialysis, ED physician placed hyperkalemia protocol.  Troponin 39.6.  Patient being admitted for chest pain workup.     6/28  Patient seen on hemodialysis for uremic clearance and ultrafiltration.    Cp resolved.  No sob.    Addendum:  --about 30 minutes after I saw her she had a syncopal episode.  Her blood sugar was 90.   Her blood was rinsed back.   She never became pulseless.  She woke up after the blood was returned.  She briefly had hypotension.  Her bp came up to 150s/80s.   She woke up and was cognitive and appropriate.  No chest pain or sob.   She received about an hour and a half of dialysis  6/29  665cc off w/HD yesterday.  VSS, no chemistry results.  LADI for stress test, then will have dialysis--only got about an hour and a half of yesterday's tx.  6/30 nuclear stress test neg- went into SVT during last 30min of HD so treatment terminated with rapid response called- patient given metoprolol IV, diltiazem IV and 250cc NS bolus and moved to ICU- got off 800cc UF yest- HR < 100, BP  stable, on RA- started on oral beta blocker    7/1  AFebrile, intermittent hypotension.  C/o coughing more today  7/2  back in SR.  Dialysis stopped short yesterday due to Afib with RVR and hemodynamic instability.  She was placed on phenylephrine for vasopressor  support (now stopped) and an amiodarone drip for antiarrhythmic support.     7/3  Patient seen on hemodialysis for uremic clearance and ultrafiltration.    7/4 s/p 1500cc UF yesterday.   No sob or cp.  Intermittent nausea         Plan of Care:     ESRD on HD MWF  Syncope  Chest pain  AF/SVT  Elevated trop- demand ischemia  HTN  Hyperkalemia  SHPT  Anemia of CKD  Hypoglycemia  pCRX      - continue HD MWF.  If she continues to become unstable during her treatments dialysis will need to be held.    - syncope- hypoglycemia- management per   - chest pain- nuclear stress test neg  - cardiology started a low dose beta blocker- monitor for bradycardia  - trend troponin- patient may not be able to afford eliquis- nurse looking into it  - BP controlled- UF is very challenging due to hemodynamic instability- will have TBD goal on HD days right now- liberalize fluids to 1.5L since there may be a component of volume depletion  - rechecked phos--at goal  - no acute BRAYAN needs  - BG is low- defer management to     Patient target weight 46kg- usually only pulled down to 46.5.  Looks at [facility] flow sheets- barely take off 500-1L per treatment.  Will go ahead and change dry weight to 47kg for now and reassess.    Discharge pending medication acquisition and repeat renal panel to f/u high K. Also need to make sure she doesn't remain hypoglycemic.    Thank you for allowing us to participate in this patient's care. We will continue to follow.    Vital Signs:  Temp Readings from Last 3 Encounters:   07/04/24 98 °F (36.7 °C) (Oral)   06/03/24 98.5 °F (36.9 °C) (Oral)   03/19/24 97.4 °F (36.3 °C) (Tympanic)       Pulse Readings from Last 3 Encounters:   07/04/24 67   06/06/24  71   06/03/24 69       BP Readings from Last 3 Encounters:   07/04/24 (!) 177/76   06/06/24 (!) 122/56   06/03/24 130/60       Weight:  Wt Readings from Last 3 Encounters:   06/28/24 48.7 kg (107 lb 5.8 oz)   06/28/24 48.7 kg (107 lb 5.8 oz)   06/06/24 46.7 kg (103 lb)     Medications:  No current facility-administered medications on file prior to encounter.     Current Outpatient Medications on File Prior to Encounter   Medication Sig Dispense Refill    atorvastatin (LIPITOR) 40 MG tablet Take 40 mg by mouth once daily.      b complex vitamins tablet Take 1 tablet by mouth once daily.      calcium acetate,phosphat bind, (PHOSLO) 667 mg capsule Take 667 mg by mouth 3 (three) times daily with meals.      dorzolamide-timolol 2-0.5% (COSOPT) 22.3-6.8 mg/mL ophthalmic solution Place 1 drop into both eyes 2 (two) times daily.      dronedarone (MULTAQ) 400 mg Tab Take 1 tablet (400 mg total) by mouth 2 (two) times daily with meals. 180 tablet 0    ELIQUIS 2.5 mg Tab Take 2.5 mg by mouth 2 (two) times daily.      latanoprost 0.005 % ophthalmic solution Place 1 drop into both eyes every evening.      loperamide (IMODIUM A-D) 2 mg Tab Take 1 tablet (2 mg total) by mouth 4 (four) times daily as needed (diarrhea). 3 tablet 0    midodrine (PROAMATINE) 10 MG tablet Take 1 tablet (10 mg total) by mouth 3 (three) times daily with meals. 270 tablet 0    ondansetron (ZOFRAN-ODT) 4 MG TbDL Take 1 tablet (4 mg total) by mouth every 6 (six) hours as needed (nausea). 30 tablet 5     Scheduled Meds:   amiodarone  200 mg Oral TID    apixaban  2.5 mg Oral BID    dorzolamide-timolol 2-0.5%  1 drop Both Eyes BID    latanoprost  1 drop Both Eyes QHS    metoprolol tartrate  12.5 mg Oral BID     Continuous Infusions:   phenylephrine  0-5 mcg/kg/min Intravenous Continuous   Stopped at 07/01/24 1545     PRN Meds:.  Current Facility-Administered Medications:     0.9% NaCl, , Intravenous, PRN    0.9% NaCl, , Intravenous, PRN    acetaminophen, 650  "mg, Oral, Q8H PRN    dextromethorphan-guaiFENesin  mg/5 ml, 10 mL, Oral, Q4H PRN    dextrose 50%, 12.5 g, Intravenous, PRN    dextrose 50%, 25 g, Intravenous, PRN    glucagon (human recombinant), 1 mg, Intramuscular, PRN    glucose, 16 g, Oral, PRN    glucose, 24 g, Oral, PRN    heparin (porcine), 5,000 Units, Intravenous, PRN    heparin (porcine), 5,000 Units, Intravenous, PRN    hydrALAZINE, 10 mg, Intravenous, Q8H PRN    magnesium oxide, 800 mg, Oral, PRN    magnesium oxide, 800 mg, Oral, PRN    melatonin, 6 mg, Oral, Nightly PRN    ondansetron, 8 mg, Intravenous, Q8H PRN    potassium bicarbonate, 35 mEq, Oral, PRN    potassium bicarbonate, 50 mEq, Oral, PRN    potassium bicarbonate, 60 mEq, Oral, PRN    potassium, sodium phosphates, 2 packet, Oral, PRN    potassium, sodium phosphates, 2 packet, Oral, PRN    potassium, sodium phosphates, 2 packet, Oral, PRN    promethazine (PHENERGAN) 12.5 mg in 0.9% NaCl 50 mL IVPB, 12.5 mg, Intravenous, Q6H PRN    sodium chloride 0.9%, 250 mL, Intravenous, PRN    sodium chloride 0.9%, 250 mL, Intravenous, PRN    sodium chloride 0.9%, 10 mL, Intravenous, PRN    Review of Systems:  Neg    Physical Exam:    BP (!) 177/76   Pulse 67   Temp 98 °F (36.7 °C) (Oral)   Resp (!) 22   Ht 5' 5" (1.651 m)   Wt 48.7 kg (107 lb 5.8 oz)   SpO2 (!) 94%   BMI 17.87 kg/m²     General Appearance:    Alert, cooperative, no distress, appears stated age   Head:    Normocephalic, without obvious abnormality, atraumatic   Eyes:    PER, conjunctiva/corneas clear, EOM's intact in both eyes        Throat:   Lips, mucosa, and tongue normal; teeth and gums normal   Back:     Symmetric, no curvature, ROM normal, no CVA tenderness   Lungs:     Clear to auscultation bilaterally, respirations unlabored   Chest wall:    No tenderness or deformity   Heart:    Regular rate and rhythm, S1 and S2 normal, no murmur, rub   or gallop   Abdomen:     Soft, non-tender, bowel sounds active all four quadrants, "     no masses, no organomegaly   Extremities:   Extremities normal, atraumatic, no cyanosis or edema   Pulses:   2+ and symmetric all extremities   MSK:   No joint or muscle swelling, tenderness or deformity   Skin:   Skin color, texture, turgor normal, no rashes or lesions   Neurologic:   CNII-XII intact, normal strength and sensation       Throughout.  No flap     Results:  Lab Results   Component Value Date     07/04/2024    K 4.4 07/04/2024     07/04/2024    CO2 22 (L) 07/04/2024    BUN 34 (H) 07/04/2024    CREATININE 6.5 (H) 07/04/2024    CALCIUM 8.5 (L) 07/04/2024    ANIONGAP 13 07/04/2024    ESTGFRAFRICA 4.7 (A) 02/08/2022    EGFRNONAA 4.0 (A) 02/08/2022       Lab Results   Component Value Date    CALCIUM 8.5 (L) 07/04/2024    PHOS 4.6 (H) 06/30/2024       Recent Labs   Lab 07/04/24  0252   WBC 4.54   RBC 4.63   HGB 12.1   HCT 40.2      MCV 87   MCH 26.1*   MCHC 30.1*        Imaging Results              X-Ray Chest AP Portable (Final result)  Result time 06/28/24 01:10:09      Final result by Aldo Espinosa MD (06/28/24 01:10:09)                   Impression:      Radiographic findings as above.      Electronically signed by: Aldo Espinosa  Date:    06/28/2024  Time:    01:10               Narrative:    EXAMINATION:  XR CHEST AP PORTABLE    CLINICAL HISTORY:  Chest Pain;    TECHNIQUE:  Single frontal view of the chest was performed.    COMPARISON:  Prior examinations, most recent of which is March 19, 2024    FINDINGS:  Single portable chest view is submitted.  When accounting for position and technique and depth of inspiration the appearance of the cardiomediastinal silhouette is stable.  Aortic atherosclerotic change and mild tortuosity is noted.    Mild accentuation of interstitial markings may relate to a pattern of mild interstitial infiltrate/edema.  There is no focal consolidation, there is no pleural effusion or pneumothorax.  Skin folds overlie the patient.    The osseous  structures demonstrate chronic change.                        Wet Read by Jacquie Wang MD (06/28/24 00:32:39, [facility] - Emergency Dept, Emergency Medicine)    No pulmonary infiltrates no pulmonary edema no cardiomegaly no pleural effusion                                    Patient care was time spent personally by me on the following activities: > 35 min  Obtaining a history  Examination of patient.  Providing medical care at the patients bedside.  Developing a treatment plan with patient or surrogate and bedside caregivers  Ordering and reviewing laboratory studies, radiographic studies, pulse oximetry.  Ordering and performing treatments and interventions.  Evaluation of patient's response to treatment.  Discussions with consultants while on the unit and immediately available to the patient.  Re-evaluation of the patient's condition.  Documentation in the medical record.       I have personally reviewed pertinent radiological imaging and reports.    Jimenez Valero MD    [facility] [place]  [phone]

## 2024-07-04 NOTE — ASSESSMENT & PLAN NOTE
This patient has hyperkalemia which is controlled. We will monitor for arrhythmias with EKG or continuous telemetry.  The likely etiology of the hyperkalemia is ESRD.  The patients latest potassium has been reviewed and the results are listed below  Recent Labs   Lab 07/03/24  0241   K 4.2     Management per dialysis

## 2024-07-04 NOTE — ASSESSMENT & PLAN NOTE
Palliative care following, patient considering her options for code status.  Hasn't decided yet, either way.

## 2024-07-04 NOTE — ASSESSMENT & PLAN NOTE
Echocardiogram with evidence of mitral stenosis that is severe . The patient's most recent echocardiogram result is listed below. Cardiology consulting with us.  Calcification is quite pronounced, so she wouldn't be a candidate for balloon valvuloplasty, and her comorbidities make her a poor candidate for open heart surgery.    Echo    Result Date: 6/28/2024    Left Ventricle: The left ventricle is normal in size. Normal wall   thickness. There is concentric remodeling. There is normal systolic   function with a visually estimated ejection fraction of 60 - 65%. Biplane   (2D) method of discs ejection fraction is 64%.    Right Ventricle: Normal right ventricular cavity size. Wall thickness   is normal. Systolic function is normal.    Left Atrium: Left atrium is mildly dilated.    Aortic Valve: The aortic valve is a trileaflet valve. Mildly calcified   left, right and noncoronary cusps. Mildly restricted motion. There is mild   stenosis. Aortic valve area by VTI is 1.80 cm². Aortic valve peak velocity   is 1.22 m/s. Mean gradient is 3 mmHg. The dimensionless index is 0.79.    Mitral Valve: There is moderate mitral annular calcification present.   There is severe stenosis. The mean pressure gradient across the mitral   valve is 9 mmHg at a heart rate of  bpm. There is mild regurgitation with   a centrally directed jet.

## 2024-07-04 NOTE — PROGRESS NOTES
[facility]  Progress Note    Patient Name: Tyra Isaac  MRN: [id number]  Patient Class: IP- Inpatient   Admission Date: 6/27/2024  Length of Stay: 6 days  Attending Physician: Sharri Foley MD  Primary Care Provider: Kalpesh Goel MD        Subjective:     Principal Problem:Chest pain        HPI:  Patient is a 84-year-old female with history of atrial fibrillation on Eliquis, ESRD on dialysis Monday Wednesday Friday, CVA presents to emergency room with complaints of chest discomfort.  Patient reports chest discomfort started earlier today lasted about 2 hours, describes it as tightness radiating from the right to left side.  Resolved prior to coming to the emergency room.  Patient reports similar symptoms in the past.  Did have episode of emesis.  Potassium 6.5 on admission, ED physician spoke with Nephrology who ordered stat dialysis, ED physician placed hyperkalemia protocol.  Troponin 39.6.  Patient being admitted for chest pain workup.    Overview/Hospital Course:  Patient was admitted with chest pain and hyperkalemia.  Her EKG was nonischemic and her troponins were noted to be elevated, which is baseline for her 2/2 ESRD. She remained chest pain free during her stay.  Nephrology was consulted for routine dialysis.  She was maintained on telemetry and cardiology was consulted.  She received medications to shift her potassium and dialysis was ordered.  Her dialysis was cut short due to syncopal episode with hypotension.  She was monitored overnight.  She went for stress testing, which was negative for reversible ischemia.  Dialysis was performed the next day and had to be stopped secondary to SVT.   During hospital stay patient got syncopal episode during dialysis and it was stopped.  Cardiology reviewed and stress test ordered and came back negative.  She had another episode of severe tachycardia after dialysis and needed Lopressor and Cardizem IV and moved to ICU.   Later beta-blocker added(initially patient was on beta-blocker for unsure reason possible hypotension) rate and blood pressure better controlled and downgraded.  Code status discussed in detail with the patient and she agreed for consultation to palliative care, however patient wants to remain full code.     Patient continue to have episodes of hypotension during dialysis. On 7/1 during dialysis patient became tachycardic and hypotensive in afib RVR. She was cardioverted and started on amio drip and phenylephrine drip. Patient is off pressors this morning. She is considering to become DNR, states she wants to talk with family. Amio drip was changed to PO. Palliative is on board.     Tolerating dialysis better. Stable to be transferred.        Interval History:  no major changes in condition.  Heart rate controlled today.  On oral amiodarone. Transfer to the floor    Review of Systems   Cardiovascular:  Negative for chest pain.     Objective:     Vital Signs (Most Recent):  Temp: 98 °F (36.7 °C) (07/04/24 0730)  Pulse: (!) 58 (07/04/24 1130)  Resp: (!) 21 (07/04/24 1130)  BP: (!) 155/70 (07/04/24 1130)  SpO2: 97 % (07/04/24 1130) Vital Signs (24h Range):  Temp:  [98 °F (36.7 °C)-98.3 °F (36.8 °C)] 98 °F (36.7 °C)  Pulse:  [58-76] 58  Resp:  [17-35] 21  SpO2:  [93 %-98 %] 97 %  BP: (141-199)/(65-87) 155/70     Weight: 48.7 kg (107 lb 5.8 oz)  Body mass index is 17.87 kg/m².    Intake/Output Summary (Last 24 hours) at 7/4/2024 1532  Last data filed at 7/4/2024 1000  Gross per 24 hour   Intake 435 ml   Output 0 ml   Net 435 ml         Physical Exam  Constitutional:       General: She is not in acute distress.     Appearance: She is not ill-appearing.   Eyes:      General:         Right eye: No discharge.         Left eye: No discharge.   Neck:      Vascular: No JVD.   Cardiovascular:      Rate and Rhythm: Normal rate and regular rhythm.   Pulmonary:      Effort: Pulmonary effort is normal.      Breath sounds: Normal  breath sounds.   Abdominal:      General: Abdomen is flat. Bowel sounds are normal. There is no distension.      Palpations: Abdomen is soft.      Tenderness: There is no abdominal tenderness.   Musculoskeletal:      Right lower leg: No edema.      Left lower leg: No edema.   Skin:     General: Skin is warm and moist.      Findings: No rash.   Neurological:      Mental Status: She is alert.   Psychiatric:         Mood and Affect: Affect is flat.             Significant Labs: All pertinent labs within the past 24 hours have been reviewed.    Significant Imaging:  No new imaging    Assessment/Plan:      * Chest pain  Cardiac work up negative   Troponin 39.6 --> 61.9  Echo show normal EF   Stress test  negative for reversible ischemia  Lopressor  Cardiologist consulting.          Demand ischemia of myocardium  Noted.      Severe mitral valve stenosis  Echocardiogram with evidence of mitral stenosis that is severe . The patient's most recent echocardiogram result is listed below. Cardiology consulting with us.  Calcification is quite pronounced, so she wouldn't be a candidate for balloon valvuloplasty, and her comorbidities make her a poor candidate for open heart surgery.    Echo    Result Date: 6/28/2024    Left Ventricle: The left ventricle is normal in size. Normal wall   thickness. There is concentric remodeling. There is normal systolic   function with a visually estimated ejection fraction of 60 - 65%. Biplane   (2D) method of discs ejection fraction is 64%.    Right Ventricle: Normal right ventricular cavity size. Wall thickness   is normal. Systolic function is normal.    Left Atrium: Left atrium is mildly dilated.    Aortic Valve: The aortic valve is a trileaflet valve. Mildly calcified   left, right and noncoronary cusps. Mildly restricted motion. There is mild   stenosis. Aortic valve area by VTI is 1.80 cm². Aortic valve peak velocity   is 1.22 m/s. Mean gradient is 3 mmHg. The dimensionless index is  0.79.    Mitral Valve: There is moderate mitral annular calcification present.   There is severe stenosis. The mean pressure gradient across the mitral   valve is 9 mmHg at a heart rate of  bpm. There is mild regurgitation with   a centrally directed jet.    Goals of care, counseling/discussion  Palliative care following, patient considering her options for code status.  Hasn't decided yet, either way.    Hyperkalemia  This patient has hyperkalemia which is controlled. We will monitor for arrhythmias with EKG or continuous telemetry.  The likely etiology of the hyperkalemia is ESRD.  The patients latest potassium has been reviewed and the results are listed below  Recent Labs   Lab 07/03/24  0241   K 4.2     Management per dialysis     ESRD (end stage renal disease)  BMP reviewed- noted Estimated Creatinine Clearance: 4 mL/min (A) (based on SCr of 8 mg/dL (H)). according to latest data. Based on current GFR, CKD stage is end stage.  Renally dose meds.     Nephrology follow up for HD, patient seems to be not tolerating HD with episodes of hypotension and tachyarrhythmia. Palliative care has been consulted.     Paroxysmal atrial fibrillation  Cardiology following.   Cont lopressor  and amiodarone.  Monitor rhythm on monitor.      Anemia associated with stage 5 chronic renal failure  Patient's anemia is currently controlled. Has not received any PRBCs to date. Etiology likely d/t chronic disease due to Chronic Kidney Disease  Current CBC reviewed-   Lab Results   Component Value Date    HGB 10.8 (L) 07/03/2024    HCT 36.1 (L) 07/03/2024     Monitor serial CBC and transfuse if patient becomes hemodynamically unstable, symptomatic or H/H drops below 7/21.    Syncope and collapse  It's secondary to drops in blood pressure, particularly during dialysis.  In addition, she's had tachyarrhythmias while on the machine.  Cardiology consulting.  Continue to monitor vital signs closely.  Will she continue to have these problems  during dialysis?  Should she consider stopping treatments and going with comfort-only measures?  Palliative care team following.  So far, patient wishes to continue getting routine dialysis.      VTE Risk Mitigation (From admission, onward)           Ordered     heparin (porcine) injection 5,000 Units  As needed (PRN)         06/29/24 1908     heparin (porcine) injection 5,000 Units  As needed (PRN)         06/29/24 1908     apixaban tablet 2.5 mg  2 times daily         06/28/24 0207     IP VTE HIGH RISK PATIENT  Once         06/28/24 0207     Place sequential compression device  Until discontinued         06/28/24 0207                    Discharge Planning   ILAN: 7/5/2024     Code Status: Full Code   Is the patient medically ready for discharge?:     Reason for patient still in hospital (select all that apply): Treatment  Discharge Plan A: Home Health   Discharge Delays: None known at this time              Sharri Foley MD  Department of Hospital Medicine   [facility]

## 2024-07-04 NOTE — SUBJECTIVE & OBJECTIVE
Interval History:  no major changes in condition.  Heart rate controlled today.  On oral amiodarone. Transfer to the floor    Review of Systems   Cardiovascular:  Negative for chest pain.     Objective:     Vital Signs (Most Recent):  Temp: 98 °F (36.7 °C) (07/04/24 0730)  Pulse: (!) 58 (07/04/24 1130)  Resp: (!) 21 (07/04/24 1130)  BP: (!) 155/70 (07/04/24 1130)  SpO2: 97 % (07/04/24 1130) Vital Signs (24h Range):  Temp:  [98 °F (36.7 °C)-98.3 °F (36.8 °C)] 98 °F (36.7 °C)  Pulse:  [58-76] 58  Resp:  [17-35] 21  SpO2:  [93 %-98 %] 97 %  BP: (141-199)/(65-87) 155/70     Weight: 48.7 kg (107 lb 5.8 oz)  Body mass index is 17.87 kg/m².    Intake/Output Summary (Last 24 hours) at 7/4/2024 1532  Last data filed at 7/4/2024 1000  Gross per 24 hour   Intake 435 ml   Output 0 ml   Net 435 ml         Physical Exam  Constitutional:       General: She is not in acute distress.     Appearance: She is not ill-appearing.   Eyes:      General:         Right eye: No discharge.         Left eye: No discharge.   Neck:      Vascular: No JVD.   Cardiovascular:      Rate and Rhythm: Normal rate and regular rhythm.   Pulmonary:      Effort: Pulmonary effort is normal.      Breath sounds: Normal breath sounds.   Abdominal:      General: Abdomen is flat. Bowel sounds are normal. There is no distension.      Palpations: Abdomen is soft.      Tenderness: There is no abdominal tenderness.   Musculoskeletal:      Right lower leg: No edema.      Left lower leg: No edema.   Skin:     General: Skin is warm and moist.      Findings: No rash.   Neurological:      Mental Status: She is alert.   Psychiatric:         Mood and Affect: Affect is flat.             Significant Labs: All pertinent labs within the past 24 hours have been reviewed.    Significant Imaging:  No new imaging

## 2024-07-04 NOTE — CARE UPDATE
07/04/24 0747   PRE-TX-O2   Device (Oxygen Therapy) room air   SpO2 97 %   Pulse Oximetry Type Continuous   $ Pulse Oximetry - Multiple Charge Pulse Oximetry - Multiple   Pulse 66   Resp (!) 21   Respiratory Evaluation   $ Care Plan Tech Time 15 min

## 2024-07-05 LAB
ALBUMIN SERPL BCP-MCNC: 3.3 G/DL (ref 3.5–5.2)
ALP SERPL-CCNC: 57 U/L (ref 55–135)
ALT SERPL W/O P-5'-P-CCNC: 14 U/L (ref 10–44)
ANION GAP SERPL CALC-SCNC: 10 MMOL/L (ref 8–16)
AST SERPL-CCNC: 15 U/L (ref 10–40)
BASOPHILS # BLD AUTO: 0.03 K/UL (ref 0–0.2)
BASOPHILS NFR BLD: 0.6 % (ref 0–1.9)
BILIRUB SERPL-MCNC: 0.4 MG/DL (ref 0.1–1)
BUN SERPL-MCNC: 58 MG/DL (ref 8–23)
CALCIUM SERPL-MCNC: 8.5 MG/DL (ref 8.7–10.5)
CHLORIDE SERPL-SCNC: 100 MMOL/L (ref 95–110)
CO2 SERPL-SCNC: 24 MMOL/L (ref 23–29)
CREAT SERPL-MCNC: 8.4 MG/DL (ref 0.5–1.4)
DIFFERENTIAL METHOD BLD: ABNORMAL
EOSINOPHIL # BLD AUTO: 0.7 K/UL (ref 0–0.5)
EOSINOPHIL NFR BLD: 13.3 % (ref 0–8)
ERYTHROCYTE [DISTWIDTH] IN BLOOD BY AUTOMATED COUNT: 16.4 % (ref 11.5–14.5)
EST. GFR  (NO RACE VARIABLE): 4.3 ML/MIN/1.73 M^2
GLUCOSE SERPL-MCNC: 96 MG/DL (ref 70–110)
HCT VFR BLD AUTO: 39.3 % (ref 37–48.5)
HGB BLD-MCNC: 11.9 G/DL (ref 12–16)
IMM GRANULOCYTES # BLD AUTO: 0.01 K/UL (ref 0–0.04)
IMM GRANULOCYTES NFR BLD AUTO: 0.2 % (ref 0–0.5)
LYMPHOCYTES # BLD AUTO: 1 K/UL (ref 1–4.8)
LYMPHOCYTES NFR BLD: 19 % (ref 18–48)
MCH RBC QN AUTO: 26.4 PG (ref 27–31)
MCHC RBC AUTO-ENTMCNC: 30.3 G/DL (ref 32–36)
MCV RBC AUTO: 87 FL (ref 82–98)
MONOCYTES # BLD AUTO: 0.9 K/UL (ref 0.3–1)
MONOCYTES NFR BLD: 16.2 % (ref 4–15)
NEUTROPHILS # BLD AUTO: 2.7 K/UL (ref 1.8–7.7)
NEUTROPHILS NFR BLD: 50.7 % (ref 38–73)
NRBC BLD-RTO: 0 /100 WBC
PLATELET # BLD AUTO: 183 K/UL (ref 150–450)
PMV BLD AUTO: 10.5 FL (ref 9.2–12.9)
POTASSIUM SERPL-SCNC: 4.9 MMOL/L (ref 3.5–5.1)
PROT SERPL-MCNC: 6.2 G/DL (ref 6–8.4)
RBC # BLD AUTO: 4.51 M/UL (ref 4–5.4)
SODIUM SERPL-SCNC: 134 MMOL/L (ref 136–145)
WBC # BLD AUTO: 5.32 K/UL (ref 3.9–12.7)

## 2024-07-05 PROCEDURE — 25000003 PHARM REV CODE 250: Performed by: INTERNAL MEDICINE

## 2024-07-05 PROCEDURE — 63600175 PHARM REV CODE 636 W HCPCS: Performed by: NURSE PRACTITIONER

## 2024-07-05 PROCEDURE — 90935 HEMODIALYSIS ONE EVALUATION: CPT

## 2024-07-05 PROCEDURE — 85025 COMPLETE CBC W/AUTO DIFF WBC: CPT | Performed by: STUDENT IN AN ORGANIZED HEALTH CARE EDUCATION/TRAINING PROGRAM

## 2024-07-05 PROCEDURE — 97530 THERAPEUTIC ACTIVITIES: CPT | Mod: CQ

## 2024-07-05 PROCEDURE — 25000003 PHARM REV CODE 250: Performed by: STUDENT IN AN ORGANIZED HEALTH CARE EDUCATION/TRAINING PROGRAM

## 2024-07-05 PROCEDURE — 36415 COLL VENOUS BLD VENIPUNCTURE: CPT | Performed by: STUDENT IN AN ORGANIZED HEALTH CARE EDUCATION/TRAINING PROGRAM

## 2024-07-05 PROCEDURE — 97535 SELF CARE MNGMENT TRAINING: CPT

## 2024-07-05 PROCEDURE — 12000002 HC ACUTE/MED SURGE SEMI-PRIVATE ROOM

## 2024-07-05 PROCEDURE — 80053 COMPREHEN METABOLIC PANEL: CPT | Performed by: STUDENT IN AN ORGANIZED HEALTH CARE EDUCATION/TRAINING PROGRAM

## 2024-07-05 RX ADMIN — DORZOLAMIDE HYDROCHLORIDE AND TIMOLOL MALEATE 1 DROP: 22.3; 6.8 SOLUTION/ DROPS OPHTHALMIC at 08:07

## 2024-07-05 RX ADMIN — APIXABAN 2.5 MG: 2.5 TABLET, FILM COATED ORAL at 08:07

## 2024-07-05 RX ADMIN — AMIODARONE HYDROCHLORIDE 200 MG: 200 TABLET ORAL at 08:07

## 2024-07-05 RX ADMIN — GUAIFENESIN AND DEXTROMETHORPHAN 10 ML: 100; 10 SYRUP ORAL at 08:07

## 2024-07-05 RX ADMIN — METOPROLOL TARTRATE 12.5 MG: 25 TABLET, FILM COATED ORAL at 08:07

## 2024-07-05 RX ADMIN — AMIODARONE HYDROCHLORIDE 200 MG: 200 TABLET ORAL at 03:07

## 2024-07-05 RX ADMIN — HYDRALAZINE HYDROCHLORIDE 10 MG: 20 INJECTION INTRAMUSCULAR; INTRAVENOUS at 08:07

## 2024-07-05 RX ADMIN — GUAIFENESIN AND DEXTROMETHORPHAN 10 ML: 100; 10 SYRUP ORAL at 02:07

## 2024-07-05 RX ADMIN — LATANOPROST 1 DROP: 50 SOLUTION/ DROPS OPHTHALMIC at 08:07

## 2024-07-05 RX ADMIN — ACETAMINOPHEN 650 MG: 325 TABLET ORAL at 02:07

## 2024-07-05 NOTE — PROGRESS NOTES
"Pending sale to [facility]  Adult Nutrition   Progress Note (Follow-Up)    SUMMARY     Recommendations  Recommendation/Intervention: 1) Continue current Renal Cardiac (Low Na/Chol); Fluid - 1500mL diet as tolerated. 2) RD recommends and has added Nepro TID to assist in meeting pts nutritional needs.  Goals: 1) Pt to meet 75% or more of her EEN/EPN by follow up.  Nutrition Goal Status: progressing towards goal  Communication of RD Recs: discussed on rounds    Nutrition Diagnosis PES Statement:  Increased nutrition needs related to ESRD as evidenced by intake < 50% EEN / EPN    Dietitian Rounds Brief  Patient followed by Paliative care; status pending pt discussion with son. Patient off unit for dialysis today. Last BM 7/3/24. RD to follow.    Nutrition Related Social Determinants of Health: SDOH: None Identified    Malnutrition Assessment      N/A           Diet order:   Current Diet Order: Renal Cardiac (Low Na/Chol); Fluid - 1500mL                 Evaluation of Received Nutrient/Fluid Intake        % Intake of Estimated Energy Needs: 25 - 50 %  % Meal Intake: 25 - 50 %      Intake/Output Summary (Last 24 hours) at 7/5/2024 1153  Last data filed at 7/5/2024 1123  Gross per 24 hour   Intake 120 ml   Output 300 ml   Net -180 ml        Anthropometrics  Temp: 98.6 °F (37 °C)  Height Method: Stated  Height: 5' 5" (165.1 cm)  Height (inches): 65 in  Weight Method: Bed Scale  Weight: 48.7 kg (107 lb 5.8 oz)  Weight (lb): 107.37 lb  Ideal Body Weight (IBW), Female: 125 lb  % Ideal Body Weight, Female (lb): 85.9 %  BMI (Calculated): 17.9  BMI Grade: 17 - 18.4 protein-energy malnutrition grade I       Estimated/Assessed Needs  Weight Used For Calorie Calculations: 48.7 kg (107 lb 5.8 oz)  Energy Calorie Requirements (kcal): 6177-4750 kcals/day (30-35 kcals/kg ABW)  Energy Need Method: Kcal/kg  Protein Requirements: 59-73 g/day (1.2-1.5 g/kg ABW) CKD on HD  Weight Used For Protein Calculations: 48.7 kg (107 lb 5.8 oz)   "   Estimated Fluid Requirement Method: RDA Method  RDA Method (mL): 1461  CHO Requirement: 183-213 g CHO/day    Reason for Assessment  Reason For Assessment: RD follow-up  Diagnosis: other (see comments), cardiac disease (Chest pain)  Relevant Medical History: Hypertension, Hyperlipidemia, Disorder of kidney and ureter, Anxiety, Depression, Gout, Encephalopathy acute, End stage kidney disease, Nephropathy hypertensive, stage 5 chronic kidney disease or end stage renal disease, Moderate episode of recurrent major depressive disorder, Osteopenia of multiple sites, Patient also has chronic kidney disease, Stroke, Obstructive pattern present on pulmonary function testing  Interdisciplinary Rounds: did not attend  Nutrition Discharge Planning: Pending    Nutrition/Diet History  Spiritual, Cultural Beliefs, Taoist Practices, Values that Affect Care: no  Food Allergies: fish, peanut  Factors Affecting Nutritional Intake: None identified at this time    Nutrition Risk Screen  Nutrition Risk Screen: no indicators present       Wound 07/03/24 0430 Medical adhesive related skin injury Left Back #1-Wound Image: Images linked  MST Score: 0  Have you recently lost weight without trying?: No  Weight loss score: 0  Have you been eating poorly because of a decreased appetite?: No  Appetite score: 0       Weight History:  Wt Readings from Last 10 Encounters:   06/28/24 48.7 kg (107 lb 5.8 oz)   06/28/24 48.7 kg (107 lb 5.8 oz)   06/06/24 46.7 kg (103 lb)   06/03/24 45.1 kg (99 lb 6.8 oz)   04/03/24 45.4 kg (100 lb)   03/21/24 45.8 kg (101 lb)   03/15/24 54.5 kg (120 lb 2.4 oz)   01/11/24 44 kg (97 lb)   01/04/24 49.9 kg (110 lb)   01/05/24 49.9 kg (110 lb 0.2 oz)        Lab/Procedures/Meds: Pertinent Labs/Meds Reviewed    Medications:Pertinent Medications Reviewed  Scheduled Meds:   amiodarone  200 mg Oral TID    apixaban  2.5 mg Oral BID    dorzolamide-timolol 2-0.5%  1 drop Both Eyes BID    latanoprost  1 drop Both Eyes QHS     metoprolol tartrate  12.5 mg Oral BID     Continuous Infusions:  PRN Meds:.  Current Facility-Administered Medications:     0.9% NaCl, , Intravenous, PRN    0.9% NaCl, , Intravenous, PRN    acetaminophen, 650 mg, Oral, Q8H PRN    dextromethorphan-guaiFENesin  mg/5 ml, 10 mL, Oral, Q4H PRN    dextrose 50%, 12.5 g, Intravenous, PRN    dextrose 50%, 25 g, Intravenous, PRN    glucagon (human recombinant), 1 mg, Intramuscular, PRN    glucose, 16 g, Oral, PRN    glucose, 24 g, Oral, PRN    heparin (porcine), 5,000 Units, Intravenous, PRN    heparin (porcine), 5,000 Units, Intravenous, PRN    hydrALAZINE, 10 mg, Intravenous, Q8H PRN    magnesium oxide, 800 mg, Oral, PRN    magnesium oxide, 800 mg, Oral, PRN    melatonin, 6 mg, Oral, Nightly PRN    ondansetron, 8 mg, Intravenous, Q8H PRN    potassium bicarbonate, 35 mEq, Oral, PRN    potassium bicarbonate, 50 mEq, Oral, PRN    potassium bicarbonate, 60 mEq, Oral, PRN    potassium, sodium phosphates, 2 packet, Oral, PRN    potassium, sodium phosphates, 2 packet, Oral, PRN    potassium, sodium phosphates, 2 packet, Oral, PRN    promethazine (PHENERGAN) 12.5 mg in 0.9% NaCl 50 mL IVPB, 12.5 mg, Intravenous, Q6H PRN    sodium chloride 0.9%, 250 mL, Intravenous, PRN    sodium chloride 0.9%, 250 mL, Intravenous, PRN    sodium chloride 0.9%, 10 mL, Intravenous, PRN    Labs: Pertinent Labs Reviewed  Clinical Chemistry:  Recent Labs   Lab 06/30/24  0328 06/30/24  1321 07/01/24  0235 07/03/24  0241 07/04/24  0252 07/05/24  0522    136   < > 134* 136 134*   K 5.9* 5.2*   < > 4.2 4.4 4.9    100   < > 97 101 100   CO2 25 24   < > 23 22* 24   GLU 54* 106   < > 139* 72 96   BUN 38* 42*   < > 52* 34* 58*   CREATININE 7.5* 8.1*   < > 8.0* 6.5* 8.4*   CALCIUM 8.9 8.9   < > 8.0* 8.5* 8.5*   PROT 6.9  --    < > 5.9* 6.4 6.2   ALBUMIN 3.7 3.6   < > 3.2* 3.4* 3.3*   BILITOT 0.5  --    < > 0.4 0.5 0.4   ALKPHOS 59  --    < > 53* 56 57   AST 16  --    < > 17 15 15   ALT 12  --     "< > 15 13 14   ANIONGAP 10 12   < > 14 13 10   PHOS 4.6* 4.6*  --   --   --   --     < > = values in this interval not displayed.     CBC:   Recent Labs   Lab 07/05/24  0522   WBC 5.32   RBC 4.51   HGB 11.9*   HCT 39.3      MCV 87   MCH 26.4*   MCHC 30.3*     Lipid Panel:  No results for input(s): "CHOL", "HDL", "LDLCALC", "TRIG", "CHOLHDL" in the last 168 hours.  Cardiac Profile:  No results for input(s): "BNP", "CPK", "CPKMB", "TROPONINI", "CKTOTAL" in the last 168 hours.  Inflammatory Labs:  No results for input(s): "CRP" in the last 168 hours.  Diabetes:  No results for input(s): "HGBA1C", "POCTGLUCOSE" in the last 168 hours.  Thyroid & Parathyroid:  No results for input(s): "TSH", "FREET4", "U6ULETG", "Q4SKTFP", "THYROIDAB" in the last 168 hours.    Monitor and Evaluation  Food and Nutrient Intake: food and beverage intake, energy intake  Food and Nutrient Adminstration: diet order  Knowledge/Beliefs/Attitudes: food and nutrition knowledge/skill, beliefs and attitudes  Physical Activity and Function: nutrition-related ADLs and IADLs, factors affecting access to physical activity  Anthropometric Measurements: weight, weight change, body mass index  Biochemical Data, Medical Tests and Procedures: lipid profile, inflammatory profile, glucose/endocrine profile, gastrointestinal profile, electrolyte and renal panel  Nutrition-Focused Physical Findings: overall appearance     Nutrition Risk  Level of Risk/Frequency of Follow-up: moderate     Nutrition Follow-Up  RD Follow-up?: Yes      Stella Winters, JAIME 07/05/2024 11:53 AM       "

## 2024-07-05 NOTE — PROGRESS NOTES
[facility] Medicine  Progress Note    Patient Name: Tyra Isaac  MRN: [id number]  Patient Class: IP- Inpatient   Admission Date: 6/27/2024  Length of Stay: 7 days  Attending Physician: Miah Dukes MD  Primary Care Provider: Kalpesh Goel MD        Subjective:     Principal Problem:Chest pain        HPI:  Patient is a 84-year-old female with history of atrial fibrillation on Eliquis, ESRD on dialysis Monday Wednesday Friday, CVA presents to emergency room with complaints of chest discomfort.  Patient reports chest discomfort started earlier today lasted about 2 hours, describes it as tightness radiating from the right to left side.  Resolved prior to coming to the emergency room.  Patient reports similar symptoms in the past.  Did have episode of emesis.  Potassium 6.5 on admission, ED physician spoke with Nephrology who ordered stat dialysis, ED physician placed hyperkalemia protocol.  Troponin 39.6.  Patient being admitted for chest pain workup.    Overview/Hospital Course:  Patient was admitted with chest pain and hyperkalemia.  Her EKG was nonischemic and her troponins were noted to be elevated, which is baseline for her 2/2 ESRD. She remained chest pain free during her stay.  Nephrology was consulted for routine dialysis.  She was maintained on telemetry and cardiology was consulted.  She received medications to shift her potassium and dialysis was ordered.  Her dialysis was cut short due to syncopal episode with hypotension.  She was monitored overnight.  She went for stress testing, which was negative for reversible ischemia.  She had another episode of severe tachycardia after dialysis and needed Lopressor and Cardizem IV and moved to ICU.  On 7/1 during dialysis patient became tachycardic and hypotensive in afib RVR. She was cardioverted and started on amio drip and phenylephrine drip.  Palliative Care was consulted, patient wishes to remain full code at this time until  she discusses it further with her family.  Patient's heart rate and BP remained stable.  The amiodarone drip was transitioned to PO and patient was transferred out of ICU on 7/4/24.  Patient had hemodialysis 7/5/24 and tolerated this well.  PT/OT was consulted who recommended home health.       Interval History: Patient seen in bed following hemodialysis.  NAD.  Denies complaints.  She tolerated her dialysis session well.     Review of Systems   Respiratory:  Negative for shortness of breath.    Cardiovascular:  Negative for chest pain and palpitations.   Gastrointestinal:  Negative for abdominal pain and nausea.     Objective:     Vital Signs (Most Recent):  Temp: 98.6 °F (37 °C) (07/05/24 1220)  Pulse: (!) 54 (07/05/24 1220)  Resp: 16 (07/05/24 1220)  BP: (!) 138/57 (07/05/24 1220)  SpO2: 96 % (07/05/24 1220) Vital Signs (24h Range):  Temp:  [98 °F (36.7 °C)-98.8 °F (37.1 °C)] 98.6 °F (37 °C)  Pulse:  [54-66] 54  Resp:  [16-19] 16  SpO2:  [95 %-99 %] 96 %  BP: (112-181)/(48-76) 138/57     Weight: 48.7 kg (107 lb 5.8 oz)  Body mass index is 17.87 kg/m².    Intake/Output Summary (Last 24 hours) at 7/5/2024 1436  Last data filed at 7/5/2024 1220  Gross per 24 hour   Intake 120 ml   Output 800 ml   Net -680 ml         Physical Exam  Vitals and nursing note reviewed.   Constitutional:       General: She is not in acute distress.  Cardiovascular:      Rate and Rhythm: Normal rate and regular rhythm.   Pulmonary:      Effort: Pulmonary effort is normal. No respiratory distress.      Breath sounds: Normal breath sounds.   Abdominal:      Palpations: Abdomen is soft.      Tenderness: There is no abdominal tenderness.   Musculoskeletal:      Right lower leg: No edema.      Left lower leg: No edema.   Skin:     General: Skin is warm and dry.   Neurological:      Mental Status: She is alert and oriented to person, place, and time.             Significant Labs: All pertinent labs within the past 24 hours have been  "reviewed.  CBC:   Recent Labs   Lab 07/04/24 0252 07/05/24 0522   WBC 4.54 5.32   HGB 12.1 11.9*   HCT 40.2 39.3    183     CMP:   Recent Labs   Lab 07/04/24 0252 07/05/24 0522    134*   K 4.4 4.9    100   CO2 22* 24   GLU 72 96   BUN 34* 58*   CREATININE 6.5* 8.4*   CALCIUM 8.5* 8.5*   PROT 6.4 6.2   ALBUMIN 3.4* 3.3*   BILITOT 0.5 0.4   ALKPHOS 56 57   AST 15 15   ALT 13 14   ANIONGAP 13 10     Magnesium: No results for input(s): "MG" in the last 48 hours.  Troponin: No results for input(s): "TROPONINI", "TROPONINIHS" in the last 48 hours.    Significant Imaging: I have reviewed all pertinent imaging results/findings within the past 24 hours.    Assessment/Plan:      * Chest pain  Cardiac work up negative   Troponin 39.6 --> 61.9  Echo show normal EF   Stress test negative for reversible ischemia  Cardiology consulted           Demand ischemia of myocardium  See chest pain A/P      Severe mitral valve stenosis  Echocardiogram with evidence of mitral stenosis that is severe . The patient's most recent echocardiogram result is listed below. Cardiology consulting with us.    Per Cardiology, calcification is quite pronounced, so she wouldn't be a candidate for balloon valvuloplasty, and her comorbidities make her a poor candidate for open heart surgery.    Echo    Result Date: 6/28/2024    Left Ventricle: The left ventricle is normal in size. Normal wall   thickness. There is concentric remodeling. There is normal systolic   function with a visually estimated ejection fraction of 60 - 65%. Biplane   (2D) method of discs ejection fraction is 64%.    Right Ventricle: Normal right ventricular cavity size. Wall thickness   is normal. Systolic function is normal.    Left Atrium: Left atrium is mildly dilated.    Aortic Valve: The aortic valve is a trileaflet valve. Mildly calcified   left, right and noncoronary cusps. Mildly restricted motion. There is mild   stenosis. Aortic valve area by VTI is " 1.80 cm². Aortic valve peak velocity   is 1.22 m/s. Mean gradient is 3 mmHg. The dimensionless index is 0.79.    Mitral Valve: There is moderate mitral annular calcification present.   There is severe stenosis. The mean pressure gradient across the mitral   valve is 9 mmHg at a heart rate of  bpm. There is mild regurgitation with   a centrally directed jet.    Goals of care, counseling/discussion  Palliative care following, patient considering her options for code status.  Would like to remain full code at this time until further discussions had with her family     Hyperkalemia  This patient has hyperkalemia which is controlled. We will monitor for arrhythmias with EKG or continuous telemetry.  The likely etiology of the hyperkalemia is ESRD.  The patients latest potassium has been reviewed and the results are listed below  Recent Labs   Lab 07/05/24  0522   K 4.9     Management per dialysis     ESRD (end stage renal disease)  BMP reviewed- noted Estimated Creatinine Clearance: 3.8 mL/min (A) (based on SCr of 8.4 mg/dL (H)). according to latest data. Based on current GFR, CKD stage is end stage.  Renally dose meds.     Nephrology following for HD, patient seems to be not tolerating HD with episodes of hypotension and tachyarrhythmia.   Palliative care has been consulted, patient wishes to continue routine dialysis at this time.     Paroxysmal atrial fibrillation  Cardiology following.   Cont lopressor and amiodarone.  Continuous tele monitoring   Continue renally dosed Eliquis       Anemia associated with stage 5 chronic renal failure  Patient's anemia is currently controlled. Has not received any PRBCs to date. Etiology likely d/t chronic disease due to Chronic Kidney Disease  Current CBC reviewed-   Lab Results   Component Value Date    HGB 11.9 (L) 07/05/2024    HCT 39.3 07/05/2024     Monitor serial CBC and transfuse if patient becomes hemodynamically unstable, symptomatic or H/H drops below 7/21.    Syncope and  collapse  Secondary to drops in blood pressure, particularly during dialysis.  In addition, she's had tachyarrhythmias while on the HD machine.  Cardiology consulted  Continue to monitor vital signs closely.  Echocardiogram with EF 60-65%, severe mitral valve stenosis       VTE Risk Mitigation (From admission, onward)           Ordered     heparin (porcine) injection 5,000 Units  As needed (PRN)         06/29/24 1908     heparin (porcine) injection 5,000 Units  As needed (PRN)         06/29/24 1908     apixaban tablet 2.5 mg  2 times daily         06/28/24 0207     IP VTE HIGH RISK PATIENT  Once         06/28/24 0207     Place sequential compression device  Until discontinued         06/28/24 0207                    Discharge Planning   ILAN: 7/7/2024     Code Status: Full Code   Is the patient medically ready for discharge?:     Reason for patient still in hospital (select all that apply): Patient trending condition  Discharge Plan A: Home Health   Discharge Delays: None known at this time              Jacquie Ascencio NP  Department of Hospital Medicine   [facility]

## 2024-07-05 NOTE — ASSESSMENT & PLAN NOTE
Palliative care following, patient considering her options for code status.  Would like to remain full code at this time until further discussions had with her family

## 2024-07-05 NOTE — ASSESSMENT & PLAN NOTE
Patient's anemia is currently controlled. Has not received any PRBCs to date. Etiology likely d/t chronic disease due to Chronic Kidney Disease  Current CBC reviewed-   Lab Results   Component Value Date    HGB 11.9 (L) 07/05/2024    HCT 39.3 07/05/2024     Monitor serial CBC and transfuse if patient becomes hemodynamically unstable, symptomatic or H/H drops below 7/21.

## 2024-07-05 NOTE — PLAN OF CARE
Problem: Skin Injury Risk Increased  Goal: Skin Health and Integrity  Intervention: Promote and Optimize Oral Intake  Flowsheets (Taken 7/5/2024 1156)  Nutrition Interventions: supplemental drinks provided     Problem: Oral Intake Inadequate  Goal: Improved Oral Intake  Outcome: Progressing  Intervention: Promote and Optimize Oral Intake  Flowsheets (Taken 7/5/2024 1156)  Nutrition Interventions: supplemental drinks provided
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
## 2024-07-05 NOTE — ASSESSMENT & PLAN NOTE
This patient has hyperkalemia which is controlled. We will monitor for arrhythmias with EKG or continuous telemetry.  The likely etiology of the hyperkalemia is ESRD.  The patients latest potassium has been reviewed and the results are listed below  Recent Labs   Lab 07/05/24  0522   K 4.9     Management per dialysis

## 2024-07-05 NOTE — ASSESSMENT & PLAN NOTE
Echocardiogram with evidence of mitral stenosis that is severe . The patient's most recent echocardiogram result is listed below. Cardiology consulting with us.    Per Cardiology, calcification is quite pronounced, so she wouldn't be a candidate for balloon valvuloplasty, and her comorbidities make her a poor candidate for open heart surgery.    Echo    Result Date: 6/28/2024    Left Ventricle: The left ventricle is normal in size. Normal wall   thickness. There is concentric remodeling. There is normal systolic   function with a visually estimated ejection fraction of 60 - 65%. Biplane   (2D) method of discs ejection fraction is 64%.    Right Ventricle: Normal right ventricular cavity size. Wall thickness   is normal. Systolic function is normal.    Left Atrium: Left atrium is mildly dilated.    Aortic Valve: The aortic valve is a trileaflet valve. Mildly calcified   left, right and noncoronary cusps. Mildly restricted motion. There is mild   stenosis. Aortic valve area by VTI is 1.80 cm². Aortic valve peak velocity   is 1.22 m/s. Mean gradient is 3 mmHg. The dimensionless index is 0.79.    Mitral Valve: There is moderate mitral annular calcification present.   There is severe stenosis. The mean pressure gradient across the mitral   valve is 9 mmHg at a heart rate of  bpm. There is mild regurgitation with   a centrally directed jet.

## 2024-07-05 NOTE — PLAN OF CARE
Problem: Adult Inpatient Plan of Care  Goal: Absence of Hospital-Acquired Illness or Injury  Outcome: Progressing  Goal: Optimal Comfort and Wellbeing  Outcome: Progressing     Problem: Hemodialysis  Goal: Absence of Infection Signs and Symptoms  Outcome: Progressing     Problem: Fall Injury Risk  Goal: Absence of Fall and Fall-Related Injury  Outcome: Progressing     Problem: Dysrhythmia  Goal: Normalized Cardiac Rhythm  Outcome: Progressing     Problem: Oral Intake Inadequate  Goal: Improved Oral Intake  Outcome: Progressing

## 2024-07-05 NOTE — NURSING
Consent and hep b status verified, removed 0uf net, due to BP, patient stable throughout treatment, no issues with access, post thrill and bruit noted, educated patient on infection control, report given to Daija, floor nurse      07/05/24 1220   Handoff Report   Received From Mary Carmen   Given To Daija   Vital Signs   Temp 98.6 °F (37 °C)   Temp Source Oral   Pulse (!) 54   Heart Rate Source Monitor   Resp 16   SpO2 96 %   Pulse Oximetry Type Intermittent   Oximetry Probe Status Applied   Device (Oxygen Therapy) room air   BP (!) 138/57   BP Location Left arm   BP Method Automatic   Patient Position Lying   Assessments (Pre/Post)   Consent Obtained yes   Safety vein preservation armband present   Date Hepatitis Profile Obtained 11/01/23   Blood Liters Processed (BLP) 61.1   Transport Modality bed   Level of Consciousness (AVPU) alert   Dialyzer Clearance mildly streaked   Pain   Preferred Pain Scale rFLACC (Revised Face Legs Arms Cry Consolability Scale)   Comfort/Acceptable Pain Level 0   Pain Rating (0-10): Rest 0   Pain Rating (0-10): Activity 0   rFLACC Pain Rating: - Face 0-->no particular expression or smile   rFLACC Pain Rating: - Legs 0-->normal position or relaxed   rFLACC Pain Rating: - Activity 0-->lying quietly, normal position, moves easily   rFLACC Pain Rating: - Cry 0-->no cry (awake or asleep)   rFLACC Pain Rating: - Consolability 0-->content, relaxed   rFLACC Score: 0   Pain Management Interventions quiet environment facilitated;position adjusted;pillow support provided   Pain/Comfort Interventions   Fever Reduction/Comfort Measures lightweight clothing;lightweight bedding   Pre-Hemodialysis Assessment   Additional Dialysis Information Needed Yes   Patient Status Departed   Treatment Consent Verified Yes   Treatment Status Completed        Hemodialysis AV Fistula Right upper arm   No Placement Date or Time found.   Present Prior to Hospital Arrival?: Yes  Location: Right upper arm   Site  Assessment Clean;Dry;Intact   Patency Present;Thrill;Bruit   Status Deaccessed   Dressing Intervention First dressing   Dressing Status Clean;Dry;Intact   Site Condition No complications   Dressing Gauze   Post-Hemodialysis Assessment   Rinseback Volume (mL) 250 mL   Blood Volume Processed (Liters) 61.1 L   Dialyzer Clearance Clear   Duration of Treatment 180 minutes   Additional Fluid Intake (mL) 0 mL   Total UF (mL) 500 mL   Net Fluid Removal 0   Patient Response to Treatment daniela well   Post-Treatment Weight 48.7 kg (107 lb 5.8 oz)   Treatment Weight Change 0   Arterial bleeding stop time (min) 5 min   Venous bleeding stop time (min) 5 min   Post-Hemodialysis Comments stable

## 2024-07-05 NOTE — PHYSICIAN QUERY
Please clarify the nutritional diagnosis associated with the clinical findings (include all that apply):  Mild Protein Calorie Malnutrition

## 2024-07-05 NOTE — ASSESSMENT & PLAN NOTE
BMP reviewed- noted Estimated Creatinine Clearance: 3.8 mL/min (A) (based on SCr of 8.4 mg/dL (H)). according to latest data. Based on current GFR, CKD stage is end stage.  Renally dose meds.     Nephrology following for HD, patient seems to be not tolerating HD with episodes of hypotension and tachyarrhythmia.   Palliative care has been consulted, patient wishes to continue routine dialysis at this time.

## 2024-07-05 NOTE — ASSESSMENT & PLAN NOTE
Cardiac work up negative   Troponin 39.6 --> 61.9  Echo show normal EF   Stress test negative for reversible ischemia  Cardiology consulted

## 2024-07-05 NOTE — PT/OT/SLP PROGRESS
Physical Therapy Treatment    Patient Name:  Tyra Isaac   MRN:  [id number]    Recommendations:     Discharge Recommendations: Low Intensity Therapy  Discharge Equipment Recommendations: none  Barriers to discharge:  decreased mobility from baseline    Assessment:     Tyra Isaac is a 84 y.o. female admitted with a medical diagnosis of Chest pain.  She presents with the following impairments/functional limitations: weakness, impaired endurance, impaired self care skills, impaired functional mobility, gait instability, impaired cardiopulmonary response to activity.    Reports fatigue after dialysis and OT but ambulated myers with RW and CGA 50'x2 with slow gait speed and no complaints. Returned to supine with CGA. Good participation.     Rehab Prognosis: Good; patient would benefit from acute skilled PT services to address these deficits and reach maximum level of function.    Recent Surgery: * No surgery found *      Plan:     During this hospitalization, patient to be seen 5 x/week to address the identified rehab impairments via gait training, therapeutic activities, therapeutic exercises, neuromuscular re-education and progress toward the following goals:    Plan of Care Expires:  08/02/24    Subjective     Chief Complaint: tired  Patient/Family Comments/goals: return to bed after walk  Pain/Comfort:  Pain Rating 1: 0/10  Pain Rating Post-Intervention 1: 0/10      Objective:     Communicated with nurse prior to session.  Patient found up in chair with telemetry, peripheral IV, chair check upon PT entry to room.     General Precautions: Standard, fall  Orthopedic Precautions: N/A  Braces: N/A  Respiratory Status: Room air     Functional Mobility:  Transfers:     Sit to Stand:  contact guard assistance with rolling walker  Gait: 50'x2, RW, CGA, slow gait speed      AM-PAC 6 CLICK MOBILITY          Treatment & Education:  -Pt educ: benefits of participation with therapy, OOB to chair during day as  tolerated, fall prevention, call light     Patient left HOB elevated with all lines intact, call button in reach, bed alarm on, nurse notified, and friend present..    GOALS:   Multidisciplinary Problems       Physical Therapy Goals          Problem: Physical Therapy    Goal Priority Disciplines Outcome Goal Variances Interventions   Physical Therapy Goal     PT, PT/OT      Description: Goals to be met by: 2024     Patient will increase functional independence with mobility by performin. Supine to sit with Supervision   2. Sit to supine with Supervision   3. Sit to stand transfer with Supervision   4. Gait  x 200 feet with Supervision using Rolling Walker.                         Time Tracking:     PT Received On: 24  PT Start Time: 1406     PT Stop Time: 1419  PT Total Time (min): 13 min     Billable Minutes: Therapeutic Activity 13    Treatment Type: Treatment  PT/PTA: PTA     Number of PTA visits since last PT visit: 2024

## 2024-07-05 NOTE — SUBJECTIVE & OBJECTIVE
Interval History: Patient seen in bed following hemodialysis.  NAD.  Denies complaints.  She tolerated her dialysis session well.     Review of Systems   Respiratory:  Negative for shortness of breath.    Cardiovascular:  Negative for chest pain and palpitations.   Gastrointestinal:  Negative for abdominal pain and nausea.     Objective:     Vital Signs (Most Recent):  Temp: 98.6 °F (37 °C) (07/05/24 1220)  Pulse: (!) 54 (07/05/24 1220)  Resp: 16 (07/05/24 1220)  BP: (!) 138/57 (07/05/24 1220)  SpO2: 96 % (07/05/24 1220) Vital Signs (24h Range):  Temp:  [98 °F (36.7 °C)-98.8 °F (37.1 °C)] 98.6 °F (37 °C)  Pulse:  [54-66] 54  Resp:  [16-19] 16  SpO2:  [95 %-99 %] 96 %  BP: (112-181)/(48-76) 138/57     Weight: 48.7 kg (107 lb 5.8 oz)  Body mass index is 17.87 kg/m².    Intake/Output Summary (Last 24 hours) at 7/5/2024 1436  Last data filed at 7/5/2024 1220  Gross per 24 hour   Intake 120 ml   Output 800 ml   Net -680 ml         Physical Exam  Vitals and nursing note reviewed.   Constitutional:       General: She is not in acute distress.  Cardiovascular:      Rate and Rhythm: Normal rate and regular rhythm.   Pulmonary:      Effort: Pulmonary effort is normal. No respiratory distress.      Breath sounds: Normal breath sounds.   Abdominal:      Palpations: Abdomen is soft.      Tenderness: There is no abdominal tenderness.   Musculoskeletal:      Right lower leg: No edema.      Left lower leg: No edema.   Skin:     General: Skin is warm and dry.   Neurological:      Mental Status: She is alert and oriented to person, place, and time.             Significant Labs: All pertinent labs within the past 24 hours have been reviewed.  CBC:   Recent Labs   Lab 07/04/24 0252 07/05/24 0522   WBC 4.54 5.32   HGB 12.1 11.9*   HCT 40.2 39.3    183     CMP:   Recent Labs   Lab 07/04/24 0252 07/05/24 0522    134*   K 4.4 4.9    100   CO2 22* 24   GLU 72 96   BUN 34* 58*   CREATININE 6.5* 8.4*   CALCIUM 8.5* 8.5*  "  PROT 6.4 6.2   ALBUMIN 3.4* 3.3*   BILITOT 0.5 0.4   ALKPHOS 56 57   AST 15 15   ALT 13 14   ANIONGAP 13 10     Magnesium: No results for input(s): "MG" in the last 48 hours.  Troponin: No results for input(s): "TROPONINI", "TROPONINIHS" in the last 48 hours.    Significant Imaging: I have reviewed all pertinent imaging results/findings within the past 24 hours.  "

## 2024-07-05 NOTE — PROGRESS NOTES
INPATIENT NEPHROLOGY [facility]  [facility] NEPHROLOGY    Tyra Isaac  07/05/2024    Reason for consultation:    esrd    Chief Complaint:   Chief Complaint   Patient presents with    Chest Pain     Pt says she has been having pain in the center of her chest for the last two hours.  Pt sad she threw up prior to the pain.  Pt has hx of afib          History of Present Illness:    Per H and P   Patient is a 84-year-old female with history of atrial fibrillation on Eliquis, ESRD on dialysis Monday Wednesday Friday, CVA presents to emergency room with complaints of chest discomfort.  Patient reports chest discomfort started earlier today lasted about 2 hours, describes it as tightness radiating from the right to left side.  Resolved prior to coming to the emergency room.  Patient reports similar symptoms in the past.  Did have episode of emesis.  Potassium 6.5 on admission, ED physician spoke with Nephrology who ordered stat dialysis, ED physician placed hyperkalemia protocol.  Troponin 39.6.  Patient being admitted for chest pain workup.     6/28  Patient seen on hemodialysis for uremic clearance and ultrafiltration.    Cp resolved.  No sob.    Addendum:  --about 30 minutes after I saw her she had a syncopal episode.  Her blood sugar was 90.   Her blood was rinsed back.   She never became pulseless.  She woke up after the blood was returned.  She briefly had hypotension.  Her bp came up to 150s/80s.   She woke up and was cognitive and appropriate.  No chest pain or sob.   She received about an hour and a half of dialysis  6/29  665cc off w/HD yesterday.  VSS, no chemistry results.  LADI for stress test, then will have dialysis--only got about an hour and a half of yesterday's tx.  6/30 nuclear stress test neg- went into SVT during last 30min of HD so treatment terminated with rapid response called- patient given metoprolol IV, diltiazem IV and 250cc NS bolus and moved to ICU- got off 800cc UF yest- HR < 100, BP  stable, on RA- started on oral beta blocker    7/1  AFebrile, intermittent hypotension.  C/o coughing more today  7/2  back in SR.  Dialysis stopped short yesterday due to Afib with RVR and hemodynamic instability.  She was placed on phenylephrine for vasopressor  support (now stopped) and an amiodarone drip for antiarrhythmic support.     7/3  Patient seen on hemodialysis for uremic clearance and ultrafiltration.    7/4 s/p 1500cc UF yesterday.   No sob or cp.  Intermittent nausea  7/5  Patient seen on hemodialysis for uremic clearance and ultrafiltration.             Plan of Care:     ESRD on HD MWF  Syncope  Chest pain  AF/SVT  Elevated trop- demand ischemia  HTN  Hyperkalemia  SHPT  Anemia of CKD  Hypoglycemia  pCRX      - continue HD MWF.  If she continues to become unstable during her treatments dialysis will need to be held.    - syncope- hypoglycemia- management per   - chest pain- nuclear stress test neg  - cardiology started a low dose beta blocker- monitor for bradycardia  - trend troponin- patient may not be able to afford eliquis- nurse looking into it  - BP controlled- UF is very challenging due to hemodynamic instability- will have TBD goal on HD days right now- liberalize fluids to 1.5L since there may be a component of volume depletion  - rechecked phos--at goal  - no acute BRAYAN needs  - BG is low- defer management to     Patient target weight 46kg- usually only pulled down to 46.5.  Looks at [facility] flow sheets- barely take off 500-1L per treatment.  Will go ahead and change dry weight to 47kg for now and reassess.    Discharge pending medication acquisition and repeat renal panel to f/u high K. Also need to make sure she doesn't remain hypoglycemic.    Thank you for allowing us to participate in this patient's care. We will continue to follow.    Vital Signs:  Temp Readings from Last 3 Encounters:   07/05/24 98.6 °F (37 °C) (Oral)   06/03/24 98.5 °F (36.9 °C) (Oral)   03/19/24 97.4 °F (36.3 °C)  (Tympanic)       Pulse Readings from Last 3 Encounters:   07/05/24 (!) 58   06/06/24 71   06/03/24 69       BP Readings from Last 3 Encounters:   07/05/24 (!) 112/48   06/06/24 (!) 122/56   06/03/24 130/60       Weight:  Wt Readings from Last 3 Encounters:   06/28/24 48.7 kg (107 lb 5.8 oz)   06/28/24 48.7 kg (107 lb 5.8 oz)   06/06/24 46.7 kg (103 lb)     Medications:  No current facility-administered medications on file prior to encounter.     Current Outpatient Medications on File Prior to Encounter   Medication Sig Dispense Refill    atorvastatin (LIPITOR) 40 MG tablet Take 40 mg by mouth once daily.      b complex vitamins tablet Take 1 tablet by mouth once daily.      calcium acetate,phosphat bind, (PHOSLO) 667 mg capsule Take 667 mg by mouth 3 (three) times daily with meals.      dorzolamide-timolol 2-0.5% (COSOPT) 22.3-6.8 mg/mL ophthalmic solution Place 1 drop into both eyes 2 (two) times daily.      dronedarone (MULTAQ) 400 mg Tab Take 1 tablet (400 mg total) by mouth 2 (two) times daily with meals. 180 tablet 0    ELIQUIS 2.5 mg Tab Take 2.5 mg by mouth 2 (two) times daily.      latanoprost 0.005 % ophthalmic solution Place 1 drop into both eyes every evening.      loperamide (IMODIUM A-D) 2 mg Tab Take 1 tablet (2 mg total) by mouth 4 (four) times daily as needed (diarrhea). 3 tablet 0    midodrine (PROAMATINE) 10 MG tablet Take 1 tablet (10 mg total) by mouth 3 (three) times daily with meals. 270 tablet 0    ondansetron (ZOFRAN-ODT) 4 MG TbDL Take 1 tablet (4 mg total) by mouth every 6 (six) hours as needed (nausea). 30 tablet 5     Scheduled Meds:   amiodarone  200 mg Oral TID    apixaban  2.5 mg Oral BID    dorzolamide-timolol 2-0.5%  1 drop Both Eyes BID    latanoprost  1 drop Both Eyes QHS    metoprolol tartrate  12.5 mg Oral BID     Continuous Infusions:      PRN Meds:.  Current Facility-Administered Medications:     0.9% NaCl, , Intravenous, PRN    0.9% NaCl, , Intravenous, PRN    acetaminophen,  "650 mg, Oral, Q8H PRN    dextromethorphan-guaiFENesin  mg/5 ml, 10 mL, Oral, Q4H PRN    dextrose 50%, 12.5 g, Intravenous, PRN    dextrose 50%, 25 g, Intravenous, PRN    glucagon (human recombinant), 1 mg, Intramuscular, PRN    glucose, 16 g, Oral, PRN    glucose, 24 g, Oral, PRN    heparin (porcine), 5,000 Units, Intravenous, PRN    heparin (porcine), 5,000 Units, Intravenous, PRN    hydrALAZINE, 10 mg, Intravenous, Q8H PRN    magnesium oxide, 800 mg, Oral, PRN    magnesium oxide, 800 mg, Oral, PRN    melatonin, 6 mg, Oral, Nightly PRN    ondansetron, 8 mg, Intravenous, Q8H PRN    potassium bicarbonate, 35 mEq, Oral, PRN    potassium bicarbonate, 50 mEq, Oral, PRN    potassium bicarbonate, 60 mEq, Oral, PRN    potassium, sodium phosphates, 2 packet, Oral, PRN    potassium, sodium phosphates, 2 packet, Oral, PRN    potassium, sodium phosphates, 2 packet, Oral, PRN    promethazine (PHENERGAN) 12.5 mg in 0.9% NaCl 50 mL IVPB, 12.5 mg, Intravenous, Q6H PRN    sodium chloride 0.9%, 250 mL, Intravenous, PRN    sodium chloride 0.9%, 250 mL, Intravenous, PRN    sodium chloride 0.9%, 10 mL, Intravenous, PRN    Review of Systems:  Neg    Physical Exam:    BP (!) 112/48   Pulse (!) 58   Temp 98.6 °F (37 °C) (Oral)   Resp 16   Ht 5' 5" (1.651 m)   Wt 48.7 kg (107 lb 5.8 oz)   SpO2 96%   BMI 17.87 kg/m²     General Appearance:    Alert, cooperative, no distress, appears stated age   Head:    Normocephalic, without obvious abnormality, atraumatic   Eyes:    PER, conjunctiva/corneas clear, EOM's intact in both eyes        Throat:   Lips, mucosa, and tongue normal; teeth and gums normal   Back:     Symmetric, no curvature, ROM normal, no CVA tenderness   Lungs:     Clear to auscultation bilaterally, respirations unlabored   Chest wall:    No tenderness or deformity   Heart:    Regular rate and rhythm, S1 and S2 normal, no murmur, rub   or gallop   Abdomen:     Soft, non-tender, bowel sounds active all four quadrants, "     no masses, no organomegaly   Extremities:   Extremities normal, atraumatic, no cyanosis or edema   Pulses:   2+ and symmetric all extremities   MSK:   No joint or muscle swelling, tenderness or deformity   Skin:   Skin color, texture, turgor normal, no rashes or lesions   Neurologic:   CNII-XII intact, normal strength and sensation       Throughout.  No flap     Results:  Lab Results   Component Value Date     (L) 07/05/2024    K 4.9 07/05/2024     07/05/2024    CO2 24 07/05/2024    BUN 58 (H) 07/05/2024    CREATININE 8.4 (H) 07/05/2024    CALCIUM 8.5 (L) 07/05/2024    ANIONGAP 10 07/05/2024    ESTGFRAFRICA 4.7 (A) 02/08/2022    EGFRNONAA 4.0 (A) 02/08/2022       Lab Results   Component Value Date    CALCIUM 8.5 (L) 07/05/2024    PHOS 4.6 (H) 06/30/2024       Recent Labs   Lab 07/05/24  0522   WBC 5.32   RBC 4.51   HGB 11.9*   HCT 39.3      MCV 87   MCH 26.4*   MCHC 30.3*        Imaging Results              X-Ray Chest AP Portable (Final result)  Result time 06/28/24 01:10:09      Final result by Aldo Espinosa MD (06/28/24 01:10:09)                   Impression:      Radiographic findings as above.      Electronically signed by: Aldo Espinosa  Date:    06/28/2024  Time:    01:10               Narrative:    EXAMINATION:  XR CHEST AP PORTABLE    CLINICAL HISTORY:  Chest Pain;    TECHNIQUE:  Single frontal view of the chest was performed.    COMPARISON:  Prior examinations, most recent of which is March 19, 2024    FINDINGS:  Single portable chest view is submitted.  When accounting for position and technique and depth of inspiration the appearance of the cardiomediastinal silhouette is stable.  Aortic atherosclerotic change and mild tortuosity is noted.    Mild accentuation of interstitial markings may relate to a pattern of mild interstitial infiltrate/edema.  There is no focal consolidation, there is no pleural effusion or pneumothorax.  Skin folds overlie the patient.    The osseous  structures demonstrate chronic change.                        Wet Read by Jacquie Wang MD (06/28/24 00:32:39, [facility] - Emergency Dept, Emergency Medicine)    No pulmonary infiltrates no pulmonary edema no cardiomegaly no pleural effusion                                    Patient care was time spent personally by me on the following activities: > 35 min  Obtaining a history  Examination of patient.  Providing medical care at the patients bedside.  Developing a treatment plan with patient or surrogate and bedside caregivers  Ordering and reviewing laboratory studies, radiographic studies, pulse oximetry.  Ordering and performing treatments and interventions.  Evaluation of patient's response to treatment.  Discussions with consultants while on the unit and immediately available to the patient.  Re-evaluation of the patient's condition.  Documentation in the medical record.       I have personally reviewed pertinent radiological imaging and reports.    Jimenez Valero MD    [facility] [place]  [phone]

## 2024-07-05 NOTE — ASSESSMENT & PLAN NOTE
Cardiology following.   Cont lopressor and amiodarone.  Continuous tele monitoring   Continue renally dosed Eliquis

## 2024-07-06 VITALS
SYSTOLIC BLOOD PRESSURE: 190 MMHG | DIASTOLIC BLOOD PRESSURE: 77 MMHG | HEART RATE: 58 BPM | OXYGEN SATURATION: 95 % | HEIGHT: 65 IN | RESPIRATION RATE: 18 BRPM | BODY MASS INDEX: 17.89 KG/M2 | WEIGHT: 107.38 LBS | TEMPERATURE: 99 F

## 2024-07-06 LAB
ALBUMIN SERPL BCP-MCNC: 3.3 G/DL (ref 3.5–5.2)
ALP SERPL-CCNC: 59 U/L (ref 55–135)
ALT SERPL W/O P-5'-P-CCNC: 15 U/L (ref 10–44)
ANION GAP SERPL CALC-SCNC: 9 MMOL/L (ref 8–16)
AST SERPL-CCNC: 17 U/L (ref 10–40)
BASOPHILS # BLD AUTO: 0.02 K/UL (ref 0–0.2)
BASOPHILS NFR BLD: 0.4 % (ref 0–1.9)
BILIRUB SERPL-MCNC: 0.4 MG/DL (ref 0.1–1)
BUN SERPL-MCNC: 47 MG/DL (ref 8–23)
CALCIUM SERPL-MCNC: 8.7 MG/DL (ref 8.7–10.5)
CHLORIDE SERPL-SCNC: 103 MMOL/L (ref 95–110)
CO2 SERPL-SCNC: 24 MMOL/L (ref 23–29)
CREAT SERPL-MCNC: 5.7 MG/DL (ref 0.5–1.4)
DIFFERENTIAL METHOD BLD: ABNORMAL
EOSINOPHIL # BLD AUTO: 0.5 K/UL (ref 0–0.5)
EOSINOPHIL NFR BLD: 10.1 % (ref 0–8)
ERYTHROCYTE [DISTWIDTH] IN BLOOD BY AUTOMATED COUNT: 16.5 % (ref 11.5–14.5)
EST. GFR  (NO RACE VARIABLE): 6.9 ML/MIN/1.73 M^2
GLUCOSE SERPL-MCNC: 123 MG/DL (ref 70–110)
HCT VFR BLD AUTO: 39.7 % (ref 37–48.5)
HGB BLD-MCNC: 11.6 G/DL (ref 12–16)
IMM GRANULOCYTES # BLD AUTO: 0.03 K/UL (ref 0–0.04)
IMM GRANULOCYTES NFR BLD AUTO: 0.6 % (ref 0–0.5)
LYMPHOCYTES # BLD AUTO: 1 K/UL (ref 1–4.8)
LYMPHOCYTES NFR BLD: 18.5 % (ref 18–48)
MAGNESIUM SERPL-MCNC: 2 MG/DL (ref 1.6–2.6)
MCH RBC QN AUTO: 25.8 PG (ref 27–31)
MCHC RBC AUTO-ENTMCNC: 29.2 G/DL (ref 32–36)
MCV RBC AUTO: 88 FL (ref 82–98)
MONOCYTES # BLD AUTO: 0.8 K/UL (ref 0.3–1)
MONOCYTES NFR BLD: 15.6 % (ref 4–15)
NEUTROPHILS # BLD AUTO: 2.9 K/UL (ref 1.8–7.7)
NEUTROPHILS NFR BLD: 54.8 % (ref 38–73)
NRBC BLD-RTO: 0 /100 WBC
PLATELET # BLD AUTO: 192 K/UL (ref 150–450)
PMV BLD AUTO: 9.8 FL (ref 9.2–12.9)
POTASSIUM SERPL-SCNC: 4.6 MMOL/L (ref 3.5–5.1)
PROT SERPL-MCNC: 6.5 G/DL (ref 6–8.4)
RBC # BLD AUTO: 4.5 M/UL (ref 4–5.4)
SODIUM SERPL-SCNC: 136 MMOL/L (ref 136–145)
WBC # BLD AUTO: 5.24 K/UL (ref 3.9–12.7)

## 2024-07-06 PROCEDURE — 36415 COLL VENOUS BLD VENIPUNCTURE: CPT | Performed by: STUDENT IN AN ORGANIZED HEALTH CARE EDUCATION/TRAINING PROGRAM

## 2024-07-06 PROCEDURE — 25000003 PHARM REV CODE 250: Performed by: INTERNAL MEDICINE

## 2024-07-06 PROCEDURE — 25000003 PHARM REV CODE 250: Performed by: STUDENT IN AN ORGANIZED HEALTH CARE EDUCATION/TRAINING PROGRAM

## 2024-07-06 PROCEDURE — 85025 COMPLETE CBC W/AUTO DIFF WBC: CPT | Performed by: STUDENT IN AN ORGANIZED HEALTH CARE EDUCATION/TRAINING PROGRAM

## 2024-07-06 PROCEDURE — 83735 ASSAY OF MAGNESIUM: CPT | Performed by: NURSE PRACTITIONER

## 2024-07-06 PROCEDURE — 80053 COMPREHEN METABOLIC PANEL: CPT | Performed by: STUDENT IN AN ORGANIZED HEALTH CARE EDUCATION/TRAINING PROGRAM

## 2024-07-06 RX ORDER — METOPROLOL TARTRATE 25 MG/1
12.5 TABLET, FILM COATED ORAL 2 TIMES DAILY
Qty: 30 TABLET | Refills: 0 | Status: SHIPPED | OUTPATIENT
Start: 2024-07-06 | End: 2024-08-05

## 2024-07-06 RX ORDER — APIXABAN 2.5 MG/1
2.5 TABLET, FILM COATED ORAL 2 TIMES DAILY
Qty: 60 TABLET | Refills: 0 | Status: SHIPPED | OUTPATIENT
Start: 2024-07-06 | End: 2024-08-05

## 2024-07-06 RX ORDER — AMIODARONE HYDROCHLORIDE 200 MG/1
TABLET ORAL
Qty: 56 TABLET | Refills: 0 | Status: SHIPPED | OUTPATIENT
Start: 2024-07-06 | End: 2024-07-27

## 2024-07-06 RX ADMIN — DORZOLAMIDE HYDROCHLORIDE AND TIMOLOL MALEATE 1 DROP: 22.3; 6.8 SOLUTION/ DROPS OPHTHALMIC at 09:07

## 2024-07-06 RX ADMIN — AMIODARONE HYDROCHLORIDE 200 MG: 200 TABLET ORAL at 09:07

## 2024-07-06 RX ADMIN — APIXABAN 2.5 MG: 2.5 TABLET, FILM COATED ORAL at 09:07

## 2024-07-06 RX ADMIN — METOPROLOL TARTRATE 12.5 MG: 25 TABLET, FILM COATED ORAL at 09:07

## 2024-07-06 NOTE — PLAN OF CARE
VICENTE called and verified COREY with [facility]. VICENTE called son and resuming home health, he agreed and added he would transport Pt home at discharge.

## 2024-07-06 NOTE — PLAN OF CARE
Discharge orders and chart reviewed with no further post-acute discharge needs identified at this time.     Pt will discharge home with [facility]. Son will transport home. COREY will begin on 7/8.    At this time, patient is cleared for discharge from Case Management standpoint.       07/06/24 1251   Final Note   Assessment Type Final Discharge Note   Anticipated Discharge Disposition Home-Health   What phone number can be called within the next 1-3 days to see how you are doing after discharge? [phone]   Post-Acute Status   Post-Acute Authorization Home Health   Home Health Status Set-up Complete/Auth obtained   Discharge Delays None known at this time

## 2024-07-06 NOTE — PLAN OF CARE
Problem: Adult Inpatient Plan of Care  Goal: Patient-Specific Goal (Individualized)  Outcome: Met  Goal: Absence of Hospital-Acquired Illness or Injury  Outcome: Met  Goal: Optimal Comfort and Wellbeing  Outcome: Met  Goal: Readiness for Transition of Care  Outcome: Met     Problem: Hemodialysis  Goal: Safe, Effective Therapy Delivery  Outcome: Met  Goal: Effective Tissue Perfusion  Outcome: Met  Goal: Absence of Infection Signs and Symptoms  Outcome: Met     Problem: Skin Injury Risk Increased  Goal: Skin Health and Integrity  Outcome: Met     Problem: Fall Injury Risk  Goal: Absence of Fall and Fall-Related Injury  Outcome: Met     Problem: Dysrhythmia  Goal: Normalized Cardiac Rhythm  Outcome: Met     Problem: Coping Ineffective  Goal: Effective Coping  Outcome: Met     Problem: Wound  Goal: Optimal Coping  Outcome: Met  Goal: Optimal Functional Ability  Outcome: Met  Goal: Absence of Infection Signs and Symptoms  Outcome: Met  Goal: Improved Oral Intake  Outcome: Met  Goal: Optimal Pain Control and Function  Outcome: Met  Goal: Skin Health and Integrity  Outcome: Met  Goal: Optimal Wound Healing  Outcome: Met     Problem: Oral Intake Inadequate  Goal: Improved Oral Intake  Outcome: Met

## 2024-07-06 NOTE — PROGRESS NOTES
INPATIENT NEPHROLOGY [place]  [facility] NEPHROLOGY    Tyra Isaac  07/06/2024    Reason for consultation:    esrd    Chief Complaint:   Chief Complaint   Patient presents with    Chest Pain     Pt says she has been having pain in the center of her chest for the last two hours.  Pt sad she threw up prior to the pain.  Pt has hx of afib          History of Present Illness:    Per H and P   Patient is a 84-year-old female with history of atrial fibrillation on Eliquis, ESRD on dialysis Monday Wednesday Friday, CVA presents to emergency room with complaints of chest discomfort.  Patient reports chest discomfort started earlier today lasted about 2 hours, describes it as tightness radiating from the right to left side.  Resolved prior to coming to the emergency room.  Patient reports similar symptoms in the past.  Did have episode of emesis.  Potassium 6.5 on admission, ED physician spoke with Nephrology who ordered stat dialysis, ED physician placed hyperkalemia protocol.  Troponin 39.6.  Patient being admitted for chest pain workup.     6/28  Patient seen on hemodialysis for uremic clearance and ultrafiltration.    Cp resolved.  No sob.    Addendum:  --about 30 minutes after I saw her she had a syncopal episode.  Her blood sugar was 90.   Her blood was rinsed back.   She never became pulseless.  She woke up after the blood was returned.  She briefly had hypotension.  Her bp came up to 150s/80s.   She woke up and was cognitive and appropriate.  No chest pain or sob.   She received about an hour and a half of dialysis  6/29  665cc off w/HD yesterday.  VSS, no chemistry results.  LADI for stress test, then will have dialysis--only got about an hour and a half of yesterday's tx.  6/30 nuclear stress test neg- went into SVT during last 30min of HD so treatment terminated with rapid response called- patient given metoprolol IV, diltiazem IV and 250cc NS bolus and moved to ICU- got off 800cc UF yest- HR < 100, BP  stable, on RA- started on oral beta blocker    7/1  AFebrile, intermittent hypotension.  C/o coughing more today  7/2  back in SR.  Dialysis stopped short yesterday due to Afib with RVR and hemodynamic instability.  She was placed on phenylephrine for vasopressor  support (now stopped) and an amiodarone drip for antiarrhythmic support.     7/3  Patient seen on hemodialysis for uremic clearance and ultrafiltration.    7/4 s/p 1500cc UF yesterday.   No sob or cp.  Intermittent nausea  7/5  Patient seen on hemodialysis for uremic clearance and ultrafiltration.   7/6   AFVSS.  Sleeping.  No acute events.             Plan of Care:     ESRD on HD MWF  Syncope  Chest pain  AF/SVT  Elevated trop- demand ischemia  HTN  Hyperkalemia  SHPT  Anemia of CKD  Hypoglycemia         - continue HD MWF.  If she continues to become unstable during her treatments dialysis will need to be held.    - syncope- hypoglycemia- management per HM  - chest pain- nuclear stress test neg  - cardiology started a low dose beta blocker- monitor for bradycardia  - trend troponin- patient may not be able to afford eliquis- nurse looking into it  - BP controlled- UF is very challenging due to hemodynamic instability- will have TBD goal on HD days right now- liberalize fluids to 1.5L since there may be a component of volume depletion  - rechecked phos--at goal  - no acute BRAYAN needs  - BG is low- defer management to   --tolerating hd better after adjusting dry weight    Patient target weight 46kg- usually only pulled down to 46.5.  Looks at [facility] flow sheets- barely take off 500-1L per treatment.  Will go ahead and change dry weight to 47kg for now and reassess.       Thank you for allowing us to participate in this patient's care. We will continue to follow.    Vital Signs:  Temp Readings from Last 3 Encounters:   07/06/24 98.6 °F (37 °C) (Oral)   06/03/24 98.5 °F (36.9 °C) (Oral)   03/19/24 97.4 °F (36.3 °C) (Tympanic)       Pulse Readings from Last 3  Encounters:   07/06/24 (!) 58   06/06/24 71   06/03/24 69       BP Readings from Last 3 Encounters:   07/06/24 (!) 190/77   06/06/24 (!) 122/56   06/03/24 130/60       Weight:  Wt Readings from Last 3 Encounters:   06/28/24 48.7 kg (107 lb 5.8 oz)   06/28/24 48.7 kg (107 lb 5.8 oz)   06/06/24 46.7 kg (103 lb)     Medications:  No current facility-administered medications on file prior to encounter.     Current Outpatient Medications on File Prior to Encounter   Medication Sig Dispense Refill    atorvastatin (LIPITOR) 40 MG tablet Take 40 mg by mouth once daily.      b complex vitamins tablet Take 1 tablet by mouth once daily.      calcium acetate,phosphat bind, (PHOSLO) 667 mg capsule Take 667 mg by mouth 3 (three) times daily with meals.      dorzolamide-timolol 2-0.5% (COSOPT) 22.3-6.8 mg/mL ophthalmic solution Place 1 drop into both eyes 2 (two) times daily.      latanoprost 0.005 % ophthalmic solution Place 1 drop into both eyes every evening.      loperamide (IMODIUM A-D) 2 mg Tab Take 1 tablet (2 mg total) by mouth 4 (four) times daily as needed (diarrhea). 3 tablet 0    midodrine (PROAMATINE) 10 MG tablet Take 1 tablet (10 mg total) by mouth 3 (three) times daily with meals. 270 tablet 0    ondansetron (ZOFRAN-ODT) 4 MG TbDL Take 1 tablet (4 mg total) by mouth every 6 (six) hours as needed (nausea). 30 tablet 5    [DISCONTINUED] dronedarone (MULTAQ) 400 mg Tab Take 1 tablet (400 mg total) by mouth 2 (two) times daily with meals. 180 tablet 0    [DISCONTINUED] ELIQUIS 2.5 mg Tab Take 2.5 mg by mouth 2 (two) times daily.       Scheduled Meds:   amiodarone  200 mg Oral TID    apixaban  2.5 mg Oral BID    dorzolamide-timolol 2-0.5%  1 drop Both Eyes BID    latanoprost  1 drop Both Eyes QHS    metoprolol tartrate  12.5 mg Oral BID     Continuous Infusions:      PRN Meds:.  Current Facility-Administered Medications:     0.9% NaCl, , Intravenous, PRN    0.9% NaCl, , Intravenous, PRN    acetaminophen, 650 mg, Oral,  "Q8H PRN    dextromethorphan-guaiFENesin  mg/5 ml, 10 mL, Oral, Q4H PRN    dextrose 50%, 12.5 g, Intravenous, PRN    dextrose 50%, 25 g, Intravenous, PRN    glucagon (human recombinant), 1 mg, Intramuscular, PRN    glucose, 16 g, Oral, PRN    glucose, 24 g, Oral, PRN    heparin (porcine), 5,000 Units, Intravenous, PRN    heparin (porcine), 5,000 Units, Intravenous, PRN    hydrALAZINE, 10 mg, Intravenous, Q8H PRN    magnesium oxide, 800 mg, Oral, PRN    magnesium oxide, 800 mg, Oral, PRN    melatonin, 6 mg, Oral, Nightly PRN    ondansetron, 8 mg, Intravenous, Q8H PRN    potassium bicarbonate, 35 mEq, Oral, PRN    potassium bicarbonate, 50 mEq, Oral, PRN    potassium bicarbonate, 60 mEq, Oral, PRN    potassium, sodium phosphates, 2 packet, Oral, PRN    potassium, sodium phosphates, 2 packet, Oral, PRN    potassium, sodium phosphates, 2 packet, Oral, PRN    promethazine (PHENERGAN) 12.5 mg in 0.9% NaCl 50 mL IVPB, 12.5 mg, Intravenous, Q6H PRN    sodium chloride 0.9%, 250 mL, Intravenous, PRN    sodium chloride 0.9%, 250 mL, Intravenous, PRN    sodium chloride 0.9%, 10 mL, Intravenous, PRN    Review of Systems:  Neg    Physical Exam:    BP (!) 190/77   Pulse (!) 58   Temp 98.6 °F (37 °C) (Oral)   Resp 18   Ht 5' 5" (1.651 m)   Wt 48.7 kg (107 lb 5.8 oz)   SpO2 95%   BMI 17.87 kg/m²     General Appearance:    Alert, cooperative, no distress, appears stated age   Head:    Normocephalic, without obvious abnormality, atraumatic   Eyes:    PER, conjunctiva/corneas clear, EOM's intact in both eyes        Throat:   Lips, mucosa, and tongue normal; teeth and gums normal   Back:     Symmetric, no curvature, ROM normal, no CVA tenderness   Lungs:     Clear to auscultation bilaterally, respirations unlabored   Chest wall:    No tenderness or deformity   Heart:    Regular rate and rhythm, S1 and S2 normal, no murmur, rub   or gallop   Abdomen:     Soft, non-tender, bowel sounds active all four quadrants,     no masses, " no organomegaly   Extremities:   Extremities normal, atraumatic, no cyanosis or edema   Pulses:   2+ and symmetric all extremities   MSK:   No joint or muscle swelling, tenderness or deformity   Skin:   Skin color, texture, turgor normal, no rashes or lesions   Neurologic:   CNII-XII intact, normal strength and sensation       Throughout.  No flap     Results:  Lab Results   Component Value Date     07/06/2024    K 4.6 07/06/2024     07/06/2024    CO2 24 07/06/2024    BUN 47 (H) 07/06/2024    CREATININE 5.7 (H) 07/06/2024    CALCIUM 8.7 07/06/2024    ANIONGAP 9 07/06/2024    ESTGFRAFRICA 4.7 (A) 02/08/2022    EGFRNONAA 4.0 (A) 02/08/2022       Lab Results   Component Value Date    CALCIUM 8.7 07/06/2024    PHOS 4.6 (H) 06/30/2024       Recent Labs   Lab 07/06/24  0431   WBC 5.24   RBC 4.50   HGB 11.6*   HCT 39.7      MCV 88   MCH 25.8*   MCHC 29.2*        Imaging Results              X-Ray Chest AP Portable (Final result)  Result time 06/28/24 01:10:09      Final result by Aldo Espinosa MD (06/28/24 01:10:09)                   Impression:      Radiographic findings as above.      Electronically signed by: Aldo Espinosa  Date:    06/28/2024  Time:    01:10               Narrative:    EXAMINATION:  XR CHEST AP PORTABLE    CLINICAL HISTORY:  Chest Pain;    TECHNIQUE:  Single frontal view of the chest was performed.    COMPARISON:  Prior examinations, most recent of which is March 19, 2024    FINDINGS:  Single portable chest view is submitted.  When accounting for position and technique and depth of inspiration the appearance of the cardiomediastinal silhouette is stable.  Aortic atherosclerotic change and mild tortuosity is noted.    Mild accentuation of interstitial markings may relate to a pattern of mild interstitial infiltrate/edema.  There is no focal consolidation, there is no pleural effusion or pneumothorax.  Skin folds overlie the patient.    The osseous structures demonstrate chronic  change.                        Wet Read by Jacquie Wang MD (06/28/24 00:32:39, [facility] - Emergency Dept, Emergency Medicine)    No pulmonary infiltrates no pulmonary edema no cardiomegaly no pleural effusion                                    Patient care was time spent personally by me on the following activities: > 35 min  Obtaining a history  Examination of patient.  Providing medical care at the patients bedside.  Developing a treatment plan with patient or surrogate and bedside caregivers  Ordering and reviewing laboratory studies, radiographic studies, pulse oximetry.  Ordering and performing treatments and interventions.  Evaluation of patient's response to treatment.  Discussions with consultants while on the unit and immediately available to the patient.  Re-evaluation of the patient's condition.  Documentation in the medical record.       I have personally reviewed pertinent radiological imaging and reports.    Jimenez Valero MD    [facility] [place]  [phone]

## 2024-07-06 NOTE — DISCHARGE SUMMARY
[facility]  Discharge Summary      Patient Name: Tyra Isaac  MRN: [id number]  [facility]: [id number]  Patient Class: IP- Inpatient  Admission Date: 6/27/2024  Hospital Length of Stay: 8 days  Discharge Date and Time:  07/06/2024 2:47 PM  Attending Physician: Newton Quiles MD   Discharging Provider: Jacquie Ascencio NP  Primary Care Provider: Kalpesh Goel MD    Primary Care Team: Networked reference to record PCT     HPI:   Patient is a 84-year-old female with history of atrial fibrillation on Eliquis, ESRD on dialysis Monday Wednesday Friday, CVA presents to emergency room with complaints of chest discomfort.  Patient reports chest discomfort started earlier today lasted about 2 hours, describes it as tightness radiating from the right to left side.  Resolved prior to coming to the emergency room.  Patient reports similar symptoms in the past.  Did have episode of emesis.  Potassium 6.5 on admission, ED physician spoke with Nephrology who ordered stat dialysis, ED physician placed hyperkalemia protocol.  Troponin 39.6.  Patient being admitted for chest pain workup.    * No surgery found *      Hospital Course:   Patient was admitted with chest pain and hyperkalemia.  Her EKG was nonischemic and her troponins were noted to be elevated, which is baseline for her 2/2 ESRD. She remained chest pain free during her stay.  Nephrology was consulted for routine dialysis.  She was maintained on telemetry and cardiology was consulted.  She received medications to shift her potassium and dialysis was ordered.  Her dialysis was cut short due to syncopal episode with hypotension.  She was monitored overnight.  She went for stress testing, which was negative for reversible ischemia.  She had another episode of severe tachycardia after dialysis and needed Lopressor and Cardizem IV and moved to ICU.  On 7/1 during dialysis patient became tachycardic and hypotensive in afib RVR. She was  cardioverted and started on amio drip and phenylephrine drip.  Palliative Care was consulted, patient wishes to remain full code at this time until she discusses it further with her family.  Patient's heart rate and BP remained stable.  The amiodarone drip was transitioned to PO and patient was transferred out of ICU on 7/4/24.  Patient had hemodialysis 7/5/24 and tolerated this well.  PT/OT was consulted who recommended home health which was ordered.  Patient seen and examined on day of discharge and deemed stable for discharge home with home health.  She will need to follow up with her PCP, Cardiologist, and Nephrologist in 1 week.  Patient agreeable with plan and eager for discharge home.           Goals of Care Treatment Preferences:  Code Status: Full Code          What is most important right now is to focus on quality of life, even if it means sacrificing a little time, curative/life-prolongation (regardless of treatment burdens).  Accordingly, we have decided that the best plan to meet the patient's goals includes continuing with treatment.      Consults:   Consults (From admission, onward)          Status Ordering Provider     Inpatient consult to Palliative Care  Once        Provider:  Jaziel Conroy MD    Completed BENI PAEZ     Inpatient consult to Cardiology  Once        Provider:  (Not yet assigned)    Completed BETINA MEDELLIN     Inpatient consult to Nephrology  Once        Provider:  Jimenez Valero MD    Completed CELESTE DE LA ROSA            No new Assessment & Plan notes have been filed under this hospital service since the last note was generated.  Service: Hospital Medicine    Final Active Diagnoses:    Diagnosis Date Noted POA    PRINCIPAL PROBLEM:  Chest pain [R07.9] 01/26/2022 Yes    Severe mitral valve stenosis [I05.0] 07/03/2024 Yes    Demand ischemia of myocardium [I24.89] 07/03/2024 Yes    Goals of care, counseling/discussion [Z71.89] 07/01/2024 Not Applicable     Hyperkalemia [E87.5] 06/28/2024 Yes    ESRD (end stage renal disease) [N18.6] 12/15/2023 Yes    Paroxysmal atrial fibrillation [I48.0] 12/09/2022 Yes    Anemia associated with stage 5 chronic renal failure [N18.5, D63.1] 01/27/2022 Yes    Syncope and collapse [R55] 12/23/2019 Yes      Problems Resolved During this Admission:       Discharged Condition: stable    Disposition: Home-Health Care [facility]    Follow Up:   Follow-up Information       Kalpesh Goel MD. Go in 3 day(s).    Specialty: Internal Medicine  Contact information:  [address]  [address]  [place] [address]  [phone]               Harsh Thompson Jr., MD Follow up in 1 week(s).    Specialty: Cardiology  Contact information:  [address] [address]  [phone]               Jimenez Valero MD Follow up in 1 week(s).    Specialty: Nephrology  Contact information:  [address] [facility] [place]  [place] [address]  [phone]                           Patient Instructions:      Ambulatory referral/consult to Home Health   Standing Status: Future   Referral Priority: Routine Referral Type: Home Health   Referral Reason: Specialty Services Required   Requested Specialty: Home Health Services   Number of Visits Requested: 1     Diet renal     Diet Cardiac     Diet renal     Notify your health care provider if you experience any of the following:  temperature >100.4     Notify your health care provider if you experience any of the following:  severe uncontrolled pain     Notify your health care provider if you experience any of the following:  persistent nausea and vomiting or diarrhea     Notify your health care provider if you experience any of the following:  difficulty breathing or increased cough     Notify your health care provider if you experience any of the following:  severe persistent headache     Notify your health care provider if you experience any of the following:  persistent dizziness, light-headedness, or  visual disturbances     Notify your health care provider if you experience any of the following:  increased confusion or weakness     SUBSEQUENT HOME HEALTH ORDERS     Order Specific Question Answer Comments   What Home Health Agency is the patient currently using? Other/External      Notify your health care provider if you experience any of the following:  temperature >100.4     Notify your health care provider if you experience any of the following:  severe uncontrolled pain     Notify your health care provider if you experience any of the following:  difficulty breathing or increased cough     Notify your health care provider if you experience any of the following:  persistent dizziness, light-headedness, or visual disturbances     Notify your health care provider if you experience any of the following:  increased confusion or weakness     Activity as tolerated     Activity as tolerated       Significant Diagnostic Studies: Labs: CMP   Recent Labs   Lab 07/05/24 0522 07/06/24 0431   * 136   K 4.9 4.6    103   CO2 24 24   GLU 96 123*   BUN 58* 47*   CREATININE 8.4* 5.7*   CALCIUM 8.5* 8.7   PROT 6.2 6.5   ALBUMIN 3.3* 3.3*   BILITOT 0.4 0.4   ALKPHOS 57 59   AST 15 17   ALT 14 15   ANIONGAP 10 9   , CBC   Recent Labs   Lab 07/05/24 0522 07/06/24 0431   WBC 5.32 5.24   HGB 11.9* 11.6*   HCT 39.3 39.7    192   , and All labs within the past 24 hours have been reviewed    Pending Diagnostic Studies:       Procedure Component Value Units Date/Time    Comprehensive metabolic panel [[id number]] Collected: 06/29/24 0526    Order Status: Sent Lab Status: In process Updated: 06/29/24 0532    Specimen: Blood            Medications:  Reconciled Home Medications:      Medication List        START taking these medications      amiodarone 200 MG Tab  Commonly known as: PACERONE  Take 1 tablet (200 mg total) by mouth 3 (three) times daily for 14 days, THEN 1 tablet (200 mg total) 2 (two) times daily for 7  days.  Start taking on: July 6, 2024     metoprolol tartrate 25 MG tablet  Commonly known as: LOPRESSOR  Take 0.5 tablets (12.5 mg total) by mouth 2 (two) times daily.            CONTINUE taking these medications      atorvastatin 40 MG tablet  Commonly known as: LIPITOR  Take 40 mg by mouth once daily.     b complex vitamins tablet  Take 1 tablet by mouth once daily.     calcium acetate(phosphat bind) 667 mg capsule  Commonly known as: PHOSLO  Take 667 mg by mouth 3 (three) times daily with meals.     dorzolamide-timolol 2-0.5% 22.3-6.8 mg/mL ophthalmic solution  Commonly known as: COSOPT  Place 1 drop into both eyes 2 (two) times daily.     ELIQUIS 2.5 mg Tab  Generic drug: apixaban  Take 1 tablet (2.5 mg total) by mouth 2 (two) times daily.     latanoprost 0.005 % ophthalmic solution  Place 1 drop into both eyes every evening.     loperamide 2 mg Tab  Commonly known as: IMODIUM A-D  Take 1 tablet (2 mg total) by mouth 4 (four) times daily as needed (diarrhea).     midodrine 10 MG tablet  Commonly known as: PROAMATINE  Take 1 tablet (10 mg total) by mouth 3 (three) times daily with meals.     ondansetron 4 MG Tbdl  Commonly known as: ZOFRAN-ODT  Take 1 tablet (4 mg total) by mouth every 6 (six) hours as needed (nausea).            STOP taking these medications      dronedarone 400 mg Tab  Commonly known as: MULTAQ              Indwelling Lines/Drains at time of discharge:   Lines/Drains/Airways       Drain  Duration                  Hemodialysis AV Fistula Right upper arm -- days                    Time spent on the discharge of patient: 35 minutes         Jacquie Ascencio NP  Department of Hospital Medicine  [facility]

## 2024-07-07 PROCEDURE — G0180 MD CERTIFICATION HHA PATIENT: HCPCS | Mod: ,,, | Performed by: INTERNAL MEDICINE

## 2024-07-09 ENCOUNTER — PATIENT OUTREACH (OUTPATIENT)
Dept: ADMINISTRATIVE | Facility: CLINIC | Age: 84
End: 2024-07-09
Payer: MEDICARE

## 2024-07-09 NOTE — PROGRESS NOTES
C3 nurse spoke with Tyra Isaac's son, Raffi for a TCC post hospital discharge follow up call. The patient has a scheduled [facility] appointment with Kalpesh Goel MD on 07/11/24 @ 1020.

## 2024-07-10 LAB
OHS QRS DURATION: 76 MS
OHS QRS DURATION: 80 MS
OHS QTC CALCULATION: 442 MS
OHS QTC CALCULATION: 500 MS

## 2024-07-11 ENCOUNTER — OFFICE VISIT (OUTPATIENT)
Dept: FAMILY MEDICINE | Facility: CLINIC | Age: 84
End: 2024-07-11
Payer: MEDICARE

## 2024-07-11 VITALS
SYSTOLIC BLOOD PRESSURE: 138 MMHG | BODY MASS INDEX: 17 KG/M2 | HEART RATE: 50 BPM | WEIGHT: 102 LBS | HEIGHT: 65 IN | DIASTOLIC BLOOD PRESSURE: 54 MMHG

## 2024-07-11 DIAGNOSIS — N18.6 STAGE 5 CHRONIC KIDNEY DISEASE ON CHRONIC DIALYSIS: ICD-10-CM

## 2024-07-11 DIAGNOSIS — I48.0 PAROXYSMAL ATRIAL FIBRILLATION: ICD-10-CM

## 2024-07-11 DIAGNOSIS — G31.84 MILD COGNITIVE IMPAIRMENT: ICD-10-CM

## 2024-07-11 DIAGNOSIS — Z09 HOSPITAL DISCHARGE FOLLOW-UP: ICD-10-CM

## 2024-07-11 DIAGNOSIS — E44.0 MODERATE PROTEIN-CALORIE MALNUTRITION: ICD-10-CM

## 2024-07-11 DIAGNOSIS — Z59.9 FINANCIAL DIFFICULTIES: ICD-10-CM

## 2024-07-11 DIAGNOSIS — R11.2 NAUSEA AND VOMITING, UNSPECIFIED VOMITING TYPE: Primary | Chronic | ICD-10-CM

## 2024-07-11 DIAGNOSIS — I25.118 CORONARY ARTERY DISEASE OF NATIVE HEART WITH STABLE ANGINA PECTORIS, UNSPECIFIED VESSEL OR LESION TYPE: ICD-10-CM

## 2024-07-11 DIAGNOSIS — Z99.2 STAGE 5 CHRONIC KIDNEY DISEASE ON CHRONIC DIALYSIS: ICD-10-CM

## 2024-07-11 DIAGNOSIS — I95.3 HEMODIALYSIS-ASSOCIATED HYPOTENSION: ICD-10-CM

## 2024-07-11 PROCEDURE — 99999 PR PBB SHADOW E&M-EST. PATIENT-LVL III: CPT | Mod: PBBFAC,HCNC,, | Performed by: INTERNAL MEDICINE

## 2024-07-11 RX ORDER — ONDANSETRON 4 MG/1
4 TABLET, ORALLY DISINTEGRATING ORAL EVERY 6 HOURS PRN
Qty: 30 TABLET | Refills: 5 | Status: SHIPPED | OUTPATIENT
Start: 2024-07-11

## 2024-07-11 RX ORDER — METOCLOPRAMIDE 5 MG/1
5 TABLET ORAL
Qty: 30 TABLET | Refills: 1 | Status: SHIPPED | OUTPATIENT
Start: 2024-07-11

## 2024-07-11 SDOH — SOCIAL DETERMINANTS OF HEALTH (SDOH): PROBLEM RELATED TO HOUSING AND ECONOMIC CIRCUMSTANCES, UNSPECIFIED: Z59.9

## 2024-07-11 NOTE — PROGRESS NOTES
I left a voicemail message for the patient to return my call.  Her follow-up urine culture returned showing Enterobacter.  The patient is allergic to sulfa and it has only intermediate sensitivity to nitrofurantoin.  I will send a prescription for Cipro 500 mg p.o. twice daily for 5 days to her listed pharmacy ([facility] in [place])I have asked the patient to call back.  I like to do a follow-up urine culture 2 weeks after completing antibiotics.  Can you please try to reach this patient on Monday, 4/5/2021 as well  Thank you  Xavier Austin M.D.     Subjective:       Patient ID: Tyra Isaac is a 84 y.o. female.    Chief Complaint: Hospital Follow Up, Shortness of Breath, Chronic Kidney Disease, Hyperlipidemia, Atrial Fibrillation, and Nausea and vomiting    Patient is a 84-year-old chronically ill and declining female who comes for follow-up after recent hospitalization.  This is a transitional care visit.  She was as usual accompanied with the son Mr. Marcelo Isaac junior.  He tends to supervise her medical as well as executive affairs.    Her  underlying medical issues are as below:  -.    1. Paroxysmal atrial fibrillation -rapid ventricular response and recent hospitalization  2. Benign hypertension with ESRD (end-stage renal disease)   3. Multiple-type hyperlipidemia   4. Stage 5 chronic kidney disease on chronic dialysis   5. Chronic anticoagulation   6. 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 Other gastritis without hemorrhage, unspecified chronicity   8. History of syncope   9.         Atrial fibrillation with rapid ventricular response  10.        General debility and poor performance       Summary of hospital stay  Primary Care Team: Networked reference to record PCT      HPI:   Patient is a 84-year-old female with history of atrial fibrillation on Eliquis, ESRD on dialysis Monday Wednesday Friday, CVA presents to emergency room with complaints of chest discomfort.  Patient reports chest discomfort started earlier today lasted about 2 hours, describes it as tightness radiating from the right to left side.  Resolved prior to coming to the emergency room.  Patient reports similar symptoms in the past.  Did have episode of emesis.  Potassium 6.5 on admission, ED physician spoke with Nephrology who ordered stat dialysis, ED physician placed hyperkalemia protocol.  Troponin 39.6.  Patient being admitted for chest pain workup.     * No surgery found *       Hospital Course:    Patient was admitted with chest pain and hyperkalemia.  Her EKG was nonischemic and her troponins were noted to be elevated, which is baseline for her 2/2 ESRD. She remained chest pain free during her stay.  Nephrology was consulted for routine dialysis.  She was maintained on telemetry and cardiology was consulted.  She received medications to shift her potassium and dialysis was ordered.  Her dialysis was cut short due to syncopal episode with hypotension.  She was monitored overnight.  She went for stress testing, which was negative for reversible ischemia.  She had another episode of severe tachycardia after dialysis and needed Lopressor and Cardizem IV and moved to ICU.  On 7/1 during dialysis patient became tachycardic and hypotensive in afib RVR. She was cardioverted and started on amio drip and phenylephrine drip.  Palliative Care was consulted, patient wishes to remain full code at this time until she discusses it further with her family.  Patient's heart rate and BP remained stable.  The amiodarone drip was transitioned to PO and patient was transferred out of ICU on 7/4/24.  Patient had hemodialysis 7/5/24 and tolerated this well.  PT/OT was consulted who recommended home health which was ordered.  Patient seen and examined on day of discharge and deemed stable for discharge home with home health.  She will need to follow up with her PCP, Cardiologist, and Nephrologist in 1 week.  Patient agreeable with plan and eager for discharge home.      1.-Educate the family, guardian or caregiver.  Family and or caretaker present at visit.    Reviewed recent hospitalization and medical care        2.-Review the patient's need for, or follow-up on, diagnostic tests and treatments.  Diagnostic tests reviewed/disposition    Follow up with Nephrology cardiology.  May need GI evaluation for potential gastroparesis.        3.-review discharge information.  (for example, discharge summary or continuity of care  documents). Disease/illness education    Discussed medications which may have no overall bearing on prognosis like statins given over appetite and barely eating anything           4.-Help schedule required community providers and services follow-up.  Home health/community services discussion/referrals.    Consider palliation and hospice but at this point patient would like to sustain her dialysis        5.-establish or reestablish referrals and arranged needed community resources.  Establishment or reestablishment of referral orders for community resources.      Continue with the providers     6..-interact with other healthcare professionals who may assume or reassume care of the patient's system-specific problems.  Discuss with other healthcare providers.    Consider GI evaluation if nausea no better with Reglan.              Shortness of Breath  This is a recurrent problem. The current episode started 1 to 4 weeks ago. The problem has been waxing and waning. The average episode lasts 5 hours. Associated symptoms include chest pain and vomiting. Pertinent negatives include no headaches, neck pain or wheezing. The treatment provided mild relief.   Nausea  This is a chronic problem. The current episode started more than 1 year ago. The problem occurs constantly. The problem has been waxing and waning. Associated symptoms include chest pain, fatigue, nausea and vomiting. Pertinent negatives include no congestion, headaches or neck pain.       Past Medical History:   Diagnosis Date    A-fib     Anxiety     Depression     Disorder of kidney and ureter     Encephalopathy acute 1/1/2018    End stage kidney disease 6/17/2017    Gout     Hyperlipidemia     Hypertension     Moderate episode of recurrent major depressive disorder 1/17/2018    Nephropathy hypertensive, stage 5 chronic kidney disease or end stage renal disease 6/17/2017    Obstructive pattern present on pulmonary function testing 7/28/2021    Shows moderate  obstruction.    Osteopenia of multiple sites 3/9/2018    Based upon bone density measurements. Patient also has chronic kidney disease.    Stroke 11/2016     Social History     Socioeconomic History    Marital status:      Spouse name: Aria Isaac    Number of children: 3   Occupational History    Occupation: Not working   Tobacco Use    Smoking status: Never    Smokeless tobacco: Never   Substance and Sexual Activity    Alcohol use: No    Drug use: No    Sexual activity: Not Currently     Social Determinants of Health     Financial Resource Strain: Medium Risk (7/11/2024)    Overall Financial Resource Strain (CARDIA)     Difficulty of Paying Living Expenses: Somewhat hard   Food Insecurity: No Food Insecurity (7/11/2024)    Hunger Vital Sign     Worried About Running Out of Food in the Last Year: Never true     Ran Out of Food in the Last Year: Never true   Transportation Needs: No Transportation Needs (7/11/2024)    TRANSPORTATION NEEDS     Transportation : No   Physical Activity: Inactive (6/15/2022)    Exercise Vital Sign     Days of Exercise per Week: 0 days     Minutes of Exercise per Session: 0 min   Stress: Stress Concern Present (7/11/2024)    [employer] - Occupational Stress Questionnaire     Feeling of Stress : To some extent   Housing Stability: Patient Declined (6/30/2024)    Housing Stability Vital Sign     Unable to Pay for Housing in the Last Year: Patient declined     Homeless in the Last Year: Patient declined     Past Surgical History:   Procedure Laterality Date    ANGIOGRAM, CORONARY, WITH LEFT HEART CATHETERIZATION N/A 2/7/2022    Procedure: Angiogram, Coronary, with Left Heart Cath;  Surgeon: Gino Leal MD;  Location: [facility] CATH/EP LAB;  Service: Cardiology;  Laterality: N/A;    CARDIAC SURGERY      stents    EYE SURGERY      WRIST SURGERY       Family History   Problem Relation Name Age of Onset    Heart disease Mother      Cancer Father    "      Review of Systems   Constitutional:  Positive for activity change, appetite change and fatigue.   HENT:  Negative for congestion, sinus pain and sneezing.    Eyes:  Negative for discharge and redness.   Respiratory:  Positive for shortness of breath. Negative for apnea and wheezing.    Cardiovascular:  Positive for chest pain. Negative for palpitations.   Gastrointestinal:  Positive for abdominal distention, nausea and vomiting. Negative for blood in stool.   Endocrine: Negative for cold intolerance, polydipsia and polyuria.   Genitourinary:  Negative for difficulty urinating, flank pain and pelvic pain.   Musculoskeletal:  Positive for gait problem. Negative for neck pain.   Neurological:  Negative for seizures, syncope, speech difficulty and headaches.   Psychiatric/Behavioral:  Positive for decreased concentration and sleep disturbance. The patient is nervous/anxious.          Objective:      Blood pressure (!) 138/54, pulse (!) 50, height 5' 5" (1.651 m), weight 46.3 kg (102 lb). Body mass index is 16.97 kg/m².  Physical Exam  Vitals and nursing note reviewed.   Constitutional:       General: She is not in acute distress.     Appearance: She is well-developed. She is ill-appearing. She is not toxic-appearing or diaphoretic.      Comments: BMI 16.97 somewhat thin built and chronically ill appearing.    HENT:      Head: Normocephalic and atraumatic.   Eyes:      General: No scleral icterus.  Neck:      Thyroid: No thyromegaly.      Vascular: No JVD.      Trachea: Trachea normal. No tracheal deviation.   Cardiovascular:      Rate and Rhythm: Normal rate. Rhythm irregular.      Heart sounds: S1 normal and S2 normal. Murmur heard.      Systolic murmur is present with a grade of 2/6.      No friction rub.      Comments: Dr Thompson Cardiologist  Pulmonary:      Effort: No respiratory distress.      Comments: Diminished air entry and some harsh conducted sounds.  No definite Creps or rhonchi.  Abdominal:      " General: There is distension.      Palpations: Abdomen is soft. Abdomen is not rigid.      Tenderness: There is no abdominal tenderness. There is no guarding.   Musculoskeletal:         General: No tenderness or deformity.        Arms:       Cervical back: Neck supple.      Right lower leg: No edema.      Left lower leg: No edema.   Lymphadenopathy:      Cervical: No cervical adenopathy.   Skin:     General: Skin is warm.      Coloration: Skin is not pale.      Findings: No bruising or rash.      Comments: Rt arm AV shunt with thrill   Neurological:      Mental Status: She is alert. Mental status is at baseline.      Coordination: Coordination normal.   Psychiatric:         Mood and Affect: Affect is not labile.         Speech: Speech normal.         Behavior: Behavior normal. Behavior is cooperative.         Cognition and Memory: Cognition is impaired (Difficulty with details of her medications and issues).         Judgment: Judgment is not inappropriate.           Assessment:       No results displayed because visit has over 200 results.      Admission on 06/03/2024, Discharged on 06/03/2024   Component Date Value Ref Range Status    WBC 06/03/2024 7.00  3.90 - 12.70 K/uL Final    RBC 06/03/2024 4.43  4.00 - 5.40 M/uL Final    Hemoglobin 06/03/2024 11.8 (L)  12.0 - 16.0 g/dL Final    Hematocrit 06/03/2024 40.2  37.0 - 48.5 % Final    MCV 06/03/2024 91  82 - 98 fL Final    MCH 06/03/2024 26.6 (L)  27.0 - 31.0 pg Final    MCHC 06/03/2024 29.4 (L)  32.0 - 36.0 g/dL Final    RDW 06/03/2024 18.2 (H)  11.5 - 14.5 % Final    Platelets 06/03/2024 226  150 - 450 K/uL Final    MPV 06/03/2024 9.7  9.2 - 12.9 fL Final    Immature Granulocytes 06/03/2024 0.3  0.0 - 0.5 % Final    Gran # (ANC) 06/03/2024 3.9  1.8 - 7.7 K/uL Final    Immature Grans (Abs) 06/03/2024 0.02  0.00 - 0.04 K/uL Final    Lymph # 06/03/2024 1.4  1.0 - 4.8 K/uL Final    Mono # 06/03/2024 1.1 (H)  0.3 - 1.0 K/uL Final    Eos # 06/03/2024 0.6 (H)  0.0 - 0.5  K/uL Final    Baso # 06/03/2024 0.02  0.00 - 0.20 K/uL Final    nRBC 06/03/2024 0  0 /100 WBC Final    Gran % 06/03/2024 55.4  38.0 - 73.0 % Final    Lymph % 06/03/2024 19.3  18.0 - 48.0 % Final    Mono % 06/03/2024 16.1 (H)  4.0 - 15.0 % Final    Eosinophil % 06/03/2024 8.6 (H)  0.0 - 8.0 % Final    Basophil % 06/03/2024 0.3  0.0 - 1.9 % Final    Differential Method 06/03/2024 Automated   Final    Sodium 06/03/2024 142  136 - 145 mmol/L Final    Potassium 06/03/2024 3.7  3.5 - 5.1 mmol/L Final    Chloride 06/03/2024 98  95 - 110 mmol/L Final    CO2 06/03/2024 32 (H)  23 - 29 mmol/L Final    Glucose 06/03/2024 62 (L)  70 - 110 mg/dL Final    BUN 06/03/2024 34 (H)  8 - 23 mg/dL Final    Creatinine 06/03/2024 6.0 (H)  0.5 - 1.4 mg/dL Final    Calcium 06/03/2024 9.0  8.7 - 10.5 mg/dL Final    Total Protein 06/03/2024 7.7  6.0 - 8.4 g/dL Final    Albumin 06/03/2024 4.4  3.5 - 5.2 g/dL Final    Total Bilirubin 06/03/2024 0.7  0.1 - 1.0 mg/dL Final    Alkaline Phosphatase 06/03/2024 63  55 - 135 U/L Final    AST 06/03/2024 28  10 - 40 U/L Final    ALT 06/03/2024 13  10 - 44 U/L Final    eGFR 06/03/2024 6.5 (A)  >60 mL/min/1.73 m^2 Final    Anion Gap 06/03/2024 12  8 - 16 mmol/L Final    Magnesium 06/03/2024 2.0  1.6 - 2.6 mg/dL Final    QRS Duration 06/03/2024 76  ms Final    [facility] QTC Calculation 06/03/2024 524  ms Final    Specimen UA 06/03/2024 Urine, Clean Catch   Final    Color, UA 06/03/2024 Yellow  Yellow, Straw, Mckayla Final    Appearance, UA 06/03/2024 Clear  Clear Final    pH, UA 06/03/2024 8.0  5.0 - 8.0 Final    Specific Gravity, UA 06/03/2024 1.010  1.005 - 1.030 Final    Protein, UA 06/03/2024 Negative  Negative Final    Glucose, UA 06/03/2024 Negative  Negative Final    Ketones, UA 06/03/2024 Negative  Negative Final    Bilirubin (UA) 06/03/2024 Negative  Negative Final    Occult Blood UA 06/03/2024 Negative  Negative Final    Nitrite, UA 06/03/2024 Negative  Negative Final    Urobilinogen, UA 06/03/2024  Negative  Negative EU/dL Final    Leukocytes, UA 06/03/2024 Negative  Negative Final    POC Glucose 06/03/2024 71  70 - 110 Final    POC Glucose 06/03/2024 65 (L)  70 - 110 Final    POC Glucose 06/03/2024 107  70 - 110 Final       1. Nausea and vomiting, unspecified vomiting type  Comments:  Cause of nausea vomiting uncertain.  I suspect gastroparesis.  Will need further investigations.  Trial of Reglan.  Orders:  -     metoclopramide HCl (REGLAN) 5 MG tablet; Take 1 tablet (5 mg total) by mouth 3 (three) times daily before meals.  Dispense: 30 tablet; Refill: 1  -     ondansetron (ZOFRAN-ODT) 4 MG TbDL; Take 1 tablet (4 mg total) by mouth every 6 (six) hours as needed (nausea).  Dispense: 30 tablet; Refill: 5    2. Stage 5 chronic kidney disease on chronic dialysis  Comments:  Rather doing poorly overall with steady decline.  There has been discussion about palliative and potentially hospice in both declined.    3. Hemodialysis-associated hypotension    4. Paroxysmal atrial fibrillation    5. Coronary artery disease of native heart with stable angina pectoris, unspecified vessel or lesion type    6. Moderate protein-calorie malnutrition    7. Mild cognitive impairment    8. Financial difficulties    9. Hospital discharge follow-up             Plan:   Nausea and vomiting, unspecified vomiting type  Comments:  Cause of nausea vomiting uncertain.  I suspect gastroparesis.  Will need further investigations.  Trial of Reglan.  Orders:  -     metoclopramide HCl (REGLAN) 5 MG tablet; Take 1 tablet (5 mg total) by mouth 3 (three) times daily before meals.  Dispense: 30 tablet; Refill: 1  -     ondansetron (ZOFRAN-ODT) 4 MG TbDL; Take 1 tablet (4 mg total) by mouth every 6 (six) hours as needed (nausea).  Dispense: 30 tablet; Refill: 5    Stage 5 chronic kidney disease on chronic dialysis  Comments:  Rather doing poorly overall with steady decline.  There has been discussion about palliative and potentially hospice in both  declined.    Hemodialysis-associated hypotension    Paroxysmal atrial fibrillation    Coronary artery disease of native heart with stable angina pectoris, unspecified vessel or lesion type    Moderate protein-calorie malnutrition    Mild cognitive impairment    Financial difficulties    Hospital discharge follow-up    Recent hospitalization has been noted.  She had complications during the dialysis, before the dialysis and after the dialysis.  Generally her condition and overall situation is not improving.      There has been a talk about palliative/hospice and at this patient point the patient and family are declining.  She will go through multiple hospitalizations he even if she was to be taken daily there.  She is still hopeful of cure and improvement.  I have given her no such insurances.      Overall poor prognosis has been noted as before with no change in expectation.      I will send Reglan 5 mg.  I have eased her diet we certainly will increase risk for conditions like hyperkalemia.    I do not feel that there is any philosophical or practical need for taking medications like atorvastatin.  She eats like a bird.    She was already bones and skin with hardly any appetite and there was no point in prescribing her 40 mg of Lipitor at this point.    Comfort and symptom control medications will be important.        Follow up in about 8 weeks (around 9/4/2024) for End-stage kidney disease.      Current Outpatient Medications:     amiodarone (PACERONE) 200 MG Tab, Take 1 tablet (200 mg total) by mouth 3 (three) times daily for 14 days, THEN 1 tablet (200 mg total) 2 (two) times daily for 7 days., Disp: 56 tablet, Rfl: 0    atorvastatin (LIPITOR) 40 MG tablet, Take 40 mg by mouth once daily., Disp: , Rfl:     b complex vitamins tablet, Take 1 tablet by mouth once daily., Disp: , Rfl:     calcium acetate,phosphat bind, (PHOSLO) 667 mg capsule, Take 667 mg by mouth 3 (three) times daily with meals., Disp: , Rfl:      dorzolamide-timolol 2-0.5% (COSOPT) 22.3-6.8 mg/mL ophthalmic solution, Place 1 drop into both eyes 2 (two) times daily., Disp: , Rfl:     ELIQUIS 2.5 mg Tab, Take 1 tablet (2.5 mg total) by mouth 2 (two) times daily., Disp: 60 tablet, Rfl: 0    latanoprost 0.005 % ophthalmic solution, Place 1 drop into both eyes every evening., Disp: , Rfl:     loperamide (IMODIUM A-D) 2 mg Tab, Take 1 tablet (2 mg total) by mouth 4 (four) times daily as needed (diarrhea)., Disp: 3 tablet, Rfl: 0    metoprolol tartrate (LOPRESSOR) 25 MG tablet, Take 0.5 tablets (12.5 mg total) by mouth 2 (two) times daily., Disp: 30 tablet, Rfl: 0    midodrine (PROAMATINE) 10 MG tablet, Take 1 tablet (10 mg total) by mouth 3 (three) times daily with meals., Disp: 270 tablet, Rfl: 0    metoclopramide HCl (REGLAN) 5 MG tablet, Take 1 tablet (5 mg total) by mouth 3 (three) times daily before meals., Disp: 30 tablet, Rfl: 1    ondansetron (ZOFRAN-ODT) 4 MG TbDL, Take 1 tablet (4 mg total) by mouth every 6 (six) hours as needed (nausea)., Disp: 30 tablet, Rfl: 5    Kalpesh Manasa

## 2024-07-13 LAB
OHS QRS DURATION: 76 MS
OHS QTC CALCULATION: 482 MS

## 2024-07-15 ENCOUNTER — HOSPITAL ENCOUNTER (INPATIENT)
Facility: HOSPITAL | Age: 84
LOS: 4 days | Discharge: HOME HEALTH | DRG: 308 | End: 2024-07-20
Attending: EMERGENCY MEDICINE | Admitting: INTERNAL MEDICINE
Payer: MEDICARE

## 2024-07-15 DIAGNOSIS — R06.00 DYSPNEA, UNSPECIFIED TYPE: ICD-10-CM

## 2024-07-15 DIAGNOSIS — R07.9 CHEST PAIN: ICD-10-CM

## 2024-07-15 DIAGNOSIS — N18.6 ESRD (END STAGE RENAL DISEASE): ICD-10-CM

## 2024-07-15 DIAGNOSIS — R00.1 BRADYCARDIA: Primary | ICD-10-CM

## 2024-07-15 DIAGNOSIS — R53.81 DEBILITY: ICD-10-CM

## 2024-07-15 LAB
ALBUMIN SERPL BCP-MCNC: 4.2 G/DL (ref 3.5–5.2)
ALLENS TEST: ABNORMAL
ALP SERPL-CCNC: 81 U/L (ref 55–135)
ALT SERPL W/O P-5'-P-CCNC: 31 U/L (ref 10–44)
ANION GAP SERPL CALC-SCNC: 12 MMOL/L (ref 8–16)
APTT PPP: 34.2 SEC (ref 21–32)
AST SERPL-CCNC: 20 U/L (ref 10–40)
BASOPHILS # BLD AUTO: 0.03 K/UL (ref 0–0.2)
BASOPHILS NFR BLD: 0.5 % (ref 0–1.9)
BILIRUB SERPL-MCNC: 0.5 MG/DL (ref 0.1–1)
BNP SERPL-MCNC: 3875 PG/ML (ref 0–99)
BUN SERPL-MCNC: 64 MG/DL (ref 8–23)
CALCIUM SERPL-MCNC: 9.8 MG/DL (ref 8.7–10.5)
CHLORIDE SERPL-SCNC: 96 MMOL/L (ref 95–110)
CO2 SERPL-SCNC: 32 MMOL/L (ref 23–29)
CREAT SERPL-MCNC: 9.4 MG/DL (ref 0.5–1.4)
DELSYS: ABNORMAL
DIFFERENTIAL METHOD BLD: ABNORMAL
EOSINOPHIL # BLD AUTO: 0.6 K/UL (ref 0–0.5)
EOSINOPHIL NFR BLD: 8.9 % (ref 0–8)
ERYTHROCYTE [DISTWIDTH] IN BLOOD BY AUTOMATED COUNT: 17.3 % (ref 11.5–14.5)
ERYTHROCYTE [SEDIMENTATION RATE] IN BLOOD BY WESTERGREN METHOD: 16 MM/H
EST. GFR  (NO RACE VARIABLE): 3.8 ML/MIN/1.73 M^2
FIO2: 21
FLOW: 0
GLUCOSE SERPL-MCNC: 108 MG/DL (ref 70–110)
GLUCOSE SERPL-MCNC: 109 MG/DL (ref 70–110)
HCO3 UR-SCNC: 31.8 MMOL/L (ref 24–28)
HCT VFR BLD AUTO: 39.4 % (ref 37–48.5)
HCT VFR BLD CALC: 36 %PCV (ref 36–54)
HGB BLD-MCNC: 11.4 G/DL (ref 12–16)
IMM GRANULOCYTES # BLD AUTO: 0.02 K/UL (ref 0–0.04)
IMM GRANULOCYTES NFR BLD AUTO: 0.3 % (ref 0–0.5)
INR PPP: 1.2 (ref 0.8–1.2)
LYMPHOCYTES # BLD AUTO: 1.2 K/UL (ref 1–4.8)
LYMPHOCYTES NFR BLD: 18.6 % (ref 18–48)
MAGNESIUM SERPL-MCNC: 2.3 MG/DL (ref 1.6–2.6)
MCH RBC QN AUTO: 25.6 PG (ref 27–31)
MCHC RBC AUTO-ENTMCNC: 28.9 G/DL (ref 32–36)
MCV RBC AUTO: 89 FL (ref 82–98)
MODE: ABNORMAL
MONOCYTES # BLD AUTO: 0.7 K/UL (ref 0.3–1)
MONOCYTES NFR BLD: 11.8 % (ref 4–15)
NEUTROPHILS # BLD AUTO: 3.8 K/UL (ref 1.8–7.7)
NEUTROPHILS NFR BLD: 59.9 % (ref 38–73)
NRBC BLD-RTO: 0 /100 WBC
PCO2 BLDA: 58.3 MMHG (ref 35–45)
PH SMN: 7.34 [PH] (ref 7.35–7.45)
PLATELET # BLD AUTO: 304 K/UL (ref 150–450)
PMV BLD AUTO: 9.9 FL (ref 9.2–12.9)
PO2 BLDA: 25 MMHG (ref 40–60)
POC BE: 6 MMOL/L
POC IONIZED CALCIUM: 1.14 MMOL/L (ref 1.06–1.42)
POC SATURATED O2: 39 % (ref 95–100)
POC TCO2: 34 MMOL/L (ref 24–29)
POTASSIUM BLD-SCNC: 4.9 MMOL/L (ref 3.5–5.1)
POTASSIUM SERPL-SCNC: 4.9 MMOL/L (ref 3.5–5.1)
PROT SERPL-MCNC: 7.7 G/DL (ref 6–8.4)
PROTHROMBIN TIME: 13.5 SEC (ref 9–12.5)
RBC # BLD AUTO: 4.45 M/UL (ref 4–5.4)
SAMPLE: ABNORMAL
SITE: ABNORMAL
SODIUM BLD-SCNC: 139 MMOL/L (ref 136–145)
SODIUM SERPL-SCNC: 140 MMOL/L (ref 136–145)
SP02: 100
TROPONIN I SERPL HS-MCNC: 31.8 PG/ML (ref 0–14.9)
TROPONIN I SERPL HS-MCNC: 32.7 PG/ML (ref 0–14.9)
WBC # BLD AUTO: 6.28 K/UL (ref 3.9–12.7)

## 2024-07-15 PROCEDURE — 5A1D70Z PERFORMANCE OF URINARY FILTRATION, INTERMITTENT, LESS THAN 6 HOURS PER DAY: ICD-10-PCS | Performed by: INTERNAL MEDICINE

## 2024-07-15 PROCEDURE — 82330 ASSAY OF CALCIUM: CPT

## 2024-07-15 PROCEDURE — 83735 ASSAY OF MAGNESIUM: CPT | Performed by: EMERGENCY MEDICINE

## 2024-07-15 PROCEDURE — G0378 HOSPITAL OBSERVATION PER HR: HCPCS

## 2024-07-15 PROCEDURE — 93010 ELECTROCARDIOGRAM REPORT: CPT | Mod: ,,, | Performed by: GENERAL PRACTICE

## 2024-07-15 PROCEDURE — 99900035 HC TECH TIME PER 15 MIN (STAT)

## 2024-07-15 PROCEDURE — 96374 THER/PROPH/DIAG INJ IV PUSH: CPT

## 2024-07-15 PROCEDURE — 83880 ASSAY OF NATRIURETIC PEPTIDE: CPT | Performed by: EMERGENCY MEDICINE

## 2024-07-15 PROCEDURE — 99285 EMERGENCY DEPT VISIT HI MDM: CPT | Mod: 25

## 2024-07-15 PROCEDURE — 80053 COMPREHEN METABOLIC PANEL: CPT | Performed by: EMERGENCY MEDICINE

## 2024-07-15 PROCEDURE — 84295 ASSAY OF SERUM SODIUM: CPT

## 2024-07-15 PROCEDURE — 84132 ASSAY OF SERUM POTASSIUM: CPT

## 2024-07-15 PROCEDURE — 93005 ELECTROCARDIOGRAM TRACING: CPT | Performed by: GENERAL PRACTICE

## 2024-07-15 PROCEDURE — 90935 HEMODIALYSIS ONE EVALUATION: CPT

## 2024-07-15 PROCEDURE — 85025 COMPLETE CBC W/AUTO DIFF WBC: CPT | Performed by: EMERGENCY MEDICINE

## 2024-07-15 PROCEDURE — 25000003 PHARM REV CODE 250: Performed by: NURSE PRACTITIONER

## 2024-07-15 PROCEDURE — 85014 HEMATOCRIT: CPT

## 2024-07-15 PROCEDURE — 96375 TX/PRO/DX INJ NEW DRUG ADDON: CPT

## 2024-07-15 PROCEDURE — 85730 THROMBOPLASTIN TIME PARTIAL: CPT | Performed by: EMERGENCY MEDICINE

## 2024-07-15 PROCEDURE — 85610 PROTHROMBIN TIME: CPT | Performed by: EMERGENCY MEDICINE

## 2024-07-15 PROCEDURE — 99223 1ST HOSP IP/OBS HIGH 75: CPT | Mod: ,,, | Performed by: GENERAL PRACTICE

## 2024-07-15 PROCEDURE — 82803 BLOOD GASES ANY COMBINATION: CPT

## 2024-07-15 PROCEDURE — 63600175 PHARM REV CODE 636 W HCPCS: Performed by: NURSE PRACTITIONER

## 2024-07-15 PROCEDURE — 84484 ASSAY OF TROPONIN QUANT: CPT | Mod: 91 | Performed by: EMERGENCY MEDICINE

## 2024-07-15 PROCEDURE — 96376 TX/PRO/DX INJ SAME DRUG ADON: CPT

## 2024-07-15 RX ORDER — IBUPROFEN 200 MG
24 TABLET ORAL
Status: DISCONTINUED | OUTPATIENT
Start: 2024-07-15 | End: 2024-07-20 | Stop reason: HOSPADM

## 2024-07-15 RX ORDER — LANOLIN ALCOHOL/MO/W.PET/CERES
800 CREAM (GRAM) TOPICAL
Status: DISCONTINUED | OUTPATIENT
Start: 2024-07-15 | End: 2024-07-20 | Stop reason: HOSPADM

## 2024-07-15 RX ORDER — ACETAMINOPHEN 325 MG/1
650 TABLET ORAL EVERY 4 HOURS PRN
Status: DISCONTINUED | OUTPATIENT
Start: 2024-07-15 | End: 2024-07-20 | Stop reason: HOSPADM

## 2024-07-15 RX ORDER — GLUCAGON 1 MG
1 KIT INJECTION
Status: DISCONTINUED | OUTPATIENT
Start: 2024-07-15 | End: 2024-07-20 | Stop reason: HOSPADM

## 2024-07-15 RX ORDER — NALOXONE HCL 0.4 MG/ML
0.02 VIAL (ML) INJECTION
Status: DISCONTINUED | OUTPATIENT
Start: 2024-07-15 | End: 2024-07-20 | Stop reason: HOSPADM

## 2024-07-15 RX ORDER — ATROPINE SULFATE 0.1 MG/ML
INJECTION INTRAVENOUS
Status: DISCONTINUED
Start: 2024-07-15 | End: 2024-07-15 | Stop reason: WASHOUT

## 2024-07-15 RX ORDER — SODIUM,POTASSIUM PHOSPHATES 280-250MG
2 POWDER IN PACKET (EA) ORAL
Status: DISCONTINUED | OUTPATIENT
Start: 2024-07-15 | End: 2024-07-20 | Stop reason: HOSPADM

## 2024-07-15 RX ORDER — ALUMINUM HYDROXIDE, MAGNESIUM HYDROXIDE, AND SIMETHICONE 1200; 120; 1200 MG/30ML; MG/30ML; MG/30ML
30 SUSPENSION ORAL 4 TIMES DAILY PRN
Status: DISCONTINUED | OUTPATIENT
Start: 2024-07-15 | End: 2024-07-20 | Stop reason: HOSPADM

## 2024-07-15 RX ORDER — IBUPROFEN 200 MG
16 TABLET ORAL
Status: DISCONTINUED | OUTPATIENT
Start: 2024-07-15 | End: 2024-07-20 | Stop reason: HOSPADM

## 2024-07-15 RX ORDER — TALC
6 POWDER (GRAM) TOPICAL NIGHTLY PRN
Status: DISCONTINUED | OUTPATIENT
Start: 2024-07-15 | End: 2024-07-20 | Stop reason: HOSPADM

## 2024-07-15 RX ORDER — SODIUM CHLORIDE 0.9 % (FLUSH) 0.9 %
3 SYRINGE (ML) INJECTION EVERY 12 HOURS PRN
Status: DISCONTINUED | OUTPATIENT
Start: 2024-07-15 | End: 2024-07-20 | Stop reason: HOSPADM

## 2024-07-15 RX ORDER — ACETAMINOPHEN 325 MG/1
650 TABLET ORAL EVERY 8 HOURS PRN
Status: DISCONTINUED | OUTPATIENT
Start: 2024-07-15 | End: 2024-07-20 | Stop reason: HOSPADM

## 2024-07-15 RX ORDER — AMOXICILLIN 250 MG
1 CAPSULE ORAL DAILY PRN
Status: DISCONTINUED | OUTPATIENT
Start: 2024-07-15 | End: 2024-07-20 | Stop reason: HOSPADM

## 2024-07-15 RX ORDER — ONDANSETRON HYDROCHLORIDE 2 MG/ML
4 INJECTION, SOLUTION INTRAVENOUS EVERY 6 HOURS PRN
Status: DISCONTINUED | OUTPATIENT
Start: 2024-07-15 | End: 2024-07-20 | Stop reason: HOSPADM

## 2024-07-15 RX ADMIN — ALUMINUM HYDROXIDE, MAGNESIUM HYDROXIDE, AND SIMETHICONE 30 ML: 200; 200; 20 SUSPENSION ORAL at 11:07

## 2024-07-15 RX ADMIN — ONDANSETRON 4 MG: 2 INJECTION INTRAMUSCULAR; INTRAVENOUS at 02:07

## 2024-07-15 NOTE — ASSESSMENT & PLAN NOTE
Patient with atrial fibrillation which is controlled currently with Beta Blocker and Amiodarone. Patient is currently in sinus bradycardia.[id number] Score: 4. Anticoagulation indicated. Anticoagulation done with renally dosed Eliquis .  Holding home amiodarone/beta blocker at this time due to bradycardia   Cardiology consulted

## 2024-07-15 NOTE — ED PROVIDER NOTES
Saturnino Figueroa MD   Physician  Emergency Medicine     ED Provider Notes     Incomplete     Creation Time: 7/15/2024  6:08 AM     Incomplete       Expand All Collapse All  Encounter Date: 7/15/2024        History          Chief Complaint   Patient presents with    Chest Pain    Shortness of Breath      Chief complaint is shortness of breath.  This nice 84-year-old female denies any chest pain.  She is a dialysis patient Monday Wednesday Friday patient of Dr. Valero she states she became short of breath about a day or so ago.  She is postop dialysis this morning at 6:15 a.m..  She denies any fever chills.  As I mentioned no chest pain.  No diarrhea no nausea vomiting.             Review of patient's allergies indicates:   Allergen Reactions    Cyclobenzaprine      Fish containing products Hives    Peanut Other (See Comments)    Tramadol Itching           Past Medical History:   Diagnosis Date    A-fib      Anxiety      Depression      Disorder of kidney and ureter      Encephalopathy acute 1/1/2018    End stage kidney disease 6/17/2017    Gout      Hyperlipidemia      Hypertension      Moderate episode of recurrent major depressive disorder 1/17/2018    Nephropathy hypertensive, stage 5 chronic kidney disease or end stage renal disease 6/17/2017    Obstructive pattern present on pulmonary function testing 7/28/2021     Shows moderate obstruction.    Osteopenia of multiple sites 3/9/2018     Based upon bone density measurements. Patient also has chronic kidney disease.    Stroke 11/2016            Past Surgical History:   Procedure Laterality Date    ANGIOGRAM, CORONARY, WITH LEFT HEART CATHETERIZATION N/A 2/7/2022     Procedure: Angiogram, Coronary, with Left Heart Cath;  Surgeon: Gino Leal MD;  Location: [facility] CATH/EP LAB;  Service: Cardiology;  Laterality: N/A;    CARDIAC SURGERY         stents    EYE SURGERY        WRIST SURGERY                 Family History   Problem Relation Name Age of Onset     Heart disease Mother        Cancer Father          Social History   Social History           Tobacco Use    Smoking status: Never    Smokeless tobacco: Never   Substance Use Topics    Alcohol use: No    Drug use: No         Review of Systems   Constitutional:  Negative for chills and fever.   HENT:  Negative for ear pain, rhinorrhea and sore throat.    Eyes:  Negative for pain and visual disturbance.   Respiratory:  Positive for shortness of breath. Negative for cough.    Cardiovascular:  Negative for chest pain and palpitations.   Gastrointestinal:  Negative for abdominal pain, constipation, diarrhea, nausea and vomiting.   Genitourinary:  Negative for dysuria, frequency, hematuria and urgency.   Musculoskeletal:  Negative for back pain, joint swelling and myalgias.   Skin:  Negative for rash.   Neurological:  Negative for dizziness, seizures, weakness and headaches.   Psychiatric/Behavioral:  Negative for dysphoric mood. The patient is not nervous/anxious.          Physical Exam             Initial Vitals   BP Pulse Resp Temp SpO2   07/15/24 0542 07/15/24 0542 07/15/24 0542 07/15/24 0546 07/15/24 0542   (!) 180/75 (!) 42 18 97.8 °F (36.6 °C) 97 %       MAP           --                          Physical Exam     Nursing note and vitals reviewed.  Constitutional: She appears well-developed and well-nourished.   HENT:   Head: Normocephalic and atraumatic.   Eyes: Conjunctivae, EOM and lids are normal. Pupils are equal, round, and reactive to light.   Neck: Trachea normal. Neck supple. No thyroid mass and no thyromegaly present.   Normal range of motion.  Cardiovascular:  Normal rate, regular rhythm and normal heart sounds.           Pulmonary/Chest: Effort normal.   Patient with minimal rales both bases.   Abdominal: Abdomen is soft. There is no abdominal tenderness.   Musculoskeletal:         General: Normal range of motion.      Cervical back: Normal range of motion and neck supple.      Neurological: She is  alert and oriented to person, place, and time. She has normal strength and normal reflexes. No cranial nerve deficit or sensory deficit.   Skin: Skin is warm and dry.   Psychiatric: She has a normal mood and affect. Her speech is normal and behavior is normal. Judgment and thought content normal.         ED Course   Procedures  Labs Reviewed   MAGNESIUM   CBC W/ AUTO DIFFERENTIAL   COMPREHENSIVE METABOLIC PANEL   TROPONIN I HIGH SENSITIVITY   B-TYPE NATRIURETIC PEPTIDE   APTT   PROTIME-INR            Imaging Results                  X-Ray Chest AP Portable (In process)                           Medications - No data to display  Medical Decision Making  Amount and/or Complexity of Data Reviewed  Labs: ordered.  Radiology: ordered.               Plan            ED Course as of 07/15/24 0728   Mon Jul 15, 2024   0657 Sodium: 140 [AP]   0657 Potassium: 4.9 [AP]   0657 Chloride: 96 [AP]   0657 BUN(!): 64 [AP]   0657 Creatinine(!): 9.4 [AP]   0657 Calcium: 9.8 [AP]   0657 PROTEIN TOTAL: 7.7 [AP]   0657 BILIRUBIN TOTAL: 0.5 [AP]   0657 ALP: 81 [AP]   0657 AST: 20 [AP]   0657 ALT: 31 [AP]   0657 eGFR(!): 3.8 [AP]   0657 PT(!): 13.5 [AP]   0657 INR: 1.2 [AP]   0657 WBC: 6.28 [AP]   0657 Hemoglobin(!): 11.4 [AP]   0657 Hematocrit: 39.4 [AP]   0657 Platelet Count: 304 [AP]   0657 Magnesium : 2.3 [AP]   0657 BNP(!): 3,875 [AP]       ED Course User Index  [AP] Ramon Boes MD                      Clinical Impression:  Final diagnoses:  [R07.9] Chest pain                           Encounter Date: 7/15/2024       History     Chief Complaint   Patient presents with    Chest Pain    Shortness of Breath     HPI  Review of patient's allergies indicates:   Allergen Reactions    Cyclobenzaprine     Fish containing products Hives    Peanut Other (See Comments)    Tramadol Itching     Past Medical History:   Diagnosis Date    A-fib     Anxiety     Depression     Disorder of kidney and ureter     Encephalopathy acute 1/1/2018    End  stage kidney disease 6/17/2017    Gout     Hyperlipidemia     Hypertension     Moderate episode of recurrent major depressive disorder 1/17/2018    Nephropathy hypertensive, 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    Osteopenia of multiple sites 3/9/2018    Based upon bone density measurements. Patient also has chronic kidney disease.    Stroke 11/2016     Past Surgical History:   Procedure Laterality Date    ANGIOGRAM, CORONARY, WITH LEFT HEART CATHETERIZATION N/A 2/7/2022    Procedure: Angiogram, Coronary, with Left Heart Cath;  Surgeon: Gino Leal MD;  Location: [facility] CATH/EP LAB;  Service: Cardiology;  Laterality: N/A;    CARDIAC SURGERY      stents    EYE SURGERY      WRIST SURGERY       Family History   Problem Relation Name Age of Onset    Heart disease Mother      Cancer Father       Social History     Tobacco Use    Smoking status: Never    Smokeless tobacco: Never   Substance Use Topics    Alcohol use: No    Drug use: No     Review of Systems    Physical Exam     Initial Vitals   BP Pulse Resp Temp SpO2   07/15/24 0542 07/15/24 0542 07/15/24 0542 07/15/24 0546 07/15/24 0542   (!) 180/75 (!) 42 18 97.8 °F (36.6 °C) 97 %      MAP       --                Physical Exam    ED Course   Procedures  Labs Reviewed   CBC W/ AUTO DIFFERENTIAL - Abnormal; Notable for the following components:       Result Value    Hemoglobin 11.4 (*)     MCH 25.6 (*)     MCHC 28.9 (*)     RDW 17.3 (*)     Eos # 0.6 (*)     Eosinophil % 8.9 (*)     All other components within normal limits   COMPREHENSIVE METABOLIC PANEL - Abnormal; Notable for the following components:    CO2 32 (*)     BUN 64 (*)     Creatinine 9.4 (*)     eGFR 3.8 (*)     All other components within normal limits   TROPONIN I HIGH SENSITIVITY - Abnormal; Notable for the following components:    Troponin I High Sensitivity 32.7 (*)      All other components within normal limits   B-TYPE NATRIURETIC PEPTIDE - Abnormal; Notable for the following components:    BNP 3,875 (*)     All other components within normal limits   APTT - Abnormal; Notable for the following components:    aPTT 34.2 (*)     All other components within normal limits   PROTIME-INR - Abnormal; Notable for the following components:    Prothrombin Time 13.5 (*)     All other components within normal limits   ISTAT PROCEDURE - Abnormal; Notable for the following components:    POC PH 7.345 (*)     POC PCO2 58.3 (*)     POC PO2 25 (*)     POC HCO3 31.8 (*)     POC BE 6 (*)     POC TCO2 34 (*)     All other components within normal limits   MAGNESIUM   TROPONIN I HIGH SENSITIVITY        ECG Results              EKG 12-lead (In process)        Collection Time Result Time QRS Duration OHS QTC Calculation    07/15/24 05:48:28 07/15/24 06:08:44 106 500                     In process by Interface, Lab In [facility] (07/15/24 06:08:46)                   Narrative:    Test Reason : R07.9,    Vent. Rate : 046 BPM     Atrial Rate : 083 BPM     P-R Int : 000 ms          QRS Dur : 106 ms      QT Int : 572 ms       P-R-T Axes : 000 -38 016 degrees     QTc Int : 500 ms    Undetermined rhythm  Left axis deviation  Incomplete left bundle branch block  Minimal voltage criteria for LVH, may be normal variant ( [place] product )  Nonspecific T wave abnormality  Prolonged QT  Abnormal ECG  When compared with ECG of 29-JUN-2024 14:38,  Current undetermined rhythm precludes rhythm comparison, needs review  Incomplete left bundle branch block is now Present    Referred By: [email or web address]   SELF           Confirmed By:                                   Imaging Results              X-Ray Chest AP Portable (Final result)  Result time 07/15/24 06:54:18      Final result by Phillip Magaña MD (07/15/24 06:54:18)                   Impression:      Ill-defined bibasilar opacities may relate to atelectasis,  aspiration, or pneumonia.      Electronically signed by: Phillip Gillana  Date:    07/15/2024  Time:    06:54               Narrative:    EXAMINATION:  XR CHEST AP PORTABLE    CLINICAL HISTORY:  Chest Pain;    TECHNIQUE:  Single frontal view of the chest was performed.    COMPARISON:  Chest radiograph performed 07/01/2024, 13:12 hours.    FINDINGS:  Monitoring leads overlie the chest.  Grossly unchanged cardiomediastinal contours.    Chronic interstitial coarsening is noted.    Ill-defined bibasilar opacities.    No definite pneumothorax or large volume pleural effusion.  No acute findings in the visualized abdomen.    Osseous and soft tissue structures appear without definite acute change.                                       Medications   atropine 0.1 mg/mL injection (has no administration in time range)     Medical Decision Making  Patient was turned over to me from Dr. Fritz awaiting labs and disposition.  Patient presenting complaining of feeling short of breath.  Patient due for dialysis today she was not able to go to dialysis because of the shortness of breath.  Patient recently was in the hospital and had episode of supraventricular tachycardia receiving Multaq and metoprolol.  Here in the emergency department patient found to be markedly bradycardic with rates in the upper 30s and low 40s.  EKGs shows sinus Jaya with prolonged QT.  Patient has remained hemodynamically stable not requiring pacing has blood pressures has been within normal limits and she has been awake and alert.  Patient previously cared for in the hospital by  who is on-call today and I will put in a consult for him.  I do think there is a strong association with the recent Multaq and metoprolol and her bradycardia and this certainly could be the cause.  Patient will require further observation in the hospital and ultimately we will also require dialysis therapy.  Patient remains clinically stable.  Discussed the case with  Jacquie Ascencio nurse practitioner [facility] who will place orders.    Amount and/or Complexity of Data Reviewed  Labs: ordered. Decision-making details documented in ED Course.  Radiology: ordered.    Risk  Decision regarding hospitalization.               ED Course as of 07/15/24 0755   Mon Jul 15, 2024   0657 Sodium: 140 [AP]   0657 Potassium: 4.9 [AP]   0657 Chloride: 96 [AP]   0657 BUN(!): 64 [AP]   0657 Creatinine(!): 9.4 [AP]   0657 Calcium: 9.8 [AP]   0657 PROTEIN TOTAL: 7.7 [AP]   0657 BILIRUBIN TOTAL: 0.5 [AP]   0657 ALP: 81 [AP]   0657 AST: 20 [AP]   0657 ALT: 31 [AP]   0657 eGFR(!): 3.8 [AP]   0657 PT(!): 13.5 [AP]   0657 INR: 1.2 [AP]   0657 WBC: 6.28 [AP]   0657 Hemoglobin(!): 11.4 [AP]   0657 Hematocrit: 39.4 [AP]   0657 Platelet Count: 304 [AP]   0657 Magnesium : 2.3 [AP]   0657 BNP(!): 3,875 [AP]      ED Course User Index  [AP] Ramon Bose MD                           Clinical Impression:  Final diagnoses:  [R00.1] Bradycardia (Primary)  [R06.00] Dyspnea, unspecified type          ED Disposition Condition    Admit Stable                Ramon Bose MD  07/15/24 0754       Ramon Bose MD  07/15/24 0755

## 2024-07-15 NOTE — Clinical Note
Diagnosis: Bradycardia [191850]   Future Attending Provider: CASSANDRA TRACY [[id number]]   Place in Observation: [facility] [[id number]]

## 2024-07-15 NOTE — ASSESSMENT & PLAN NOTE
Hemodynamically stable, alert   Hold home amiodarone and metoprolol   Continuous cardiac monitoring   Consult Cardiology

## 2024-07-15 NOTE — PROGRESS NOTES
07/15/24 1815   Handoff Report   Received From Hector CONROY RN   Treatment Type   Treatment Type Maintenance   Vital Signs   Temp 98.3 °F (36.8 °C)   Temp Source Oral   Pulse (!) 45   Heart Rate Source Monitor   Resp (!) 24   SpO2 99 %   Flow (L/min) (Oxygen Therapy) 2   Device (Oxygen Therapy) nasal cannula   BP (!) 144/50   BP Method Automatic   Patient Position Lying   Assessments (Pre/Post)   Blood Liters Processed (BLP) 45.8   Transport Modality stretcher   Level of Consciousness (AVPU) alert   Dialyzer Clearance moderately streaked   Pain   Pain Rating (0-10): Rest 0   Post-Hemodialysis Assessment   Rinseback Volume (mL) 250 mL   Blood Volume Processed (Liters) 45.8 L   Dialyzer Clearance Moderately streaked   Duration of Treatment 180 minutes   Additional Fluid Intake (mL) 500 mL   Total UF (mL) 2500 mL   Net Fluid Removal 2000   Patient Response to Treatment pt daniela tx   Post-Treatment Weight   (POLA)   Arterial bleeding stop time (min) 6 min   Venous bleeding stop time (min) 6 min   Post-Hemodialysis Comments pt stable     Pt bradycardia throughout tx, 2000 ml net UF removed

## 2024-07-15 NOTE — CONSULTS
INPATIENT NEPHROLOGY CONSULT   [facility]    Patient Name: Tyra Isaac  Date: 07/15/2024    Reason for consultation: ESRD    Chief Complaint:   Chief Complaint   Patient presents with    Chest Pain    Shortness of Breath       History of Present Illness:  85 y/o F with hx of ESRD on HD MWF and pAF who p/w dyspnea, no chest pain, no exac/reliev factors, due for HD today, consulted for dialysis.    Interval History:  7/15- HR 40s, -200, on RA, BNP 3800 (900 2 weeks ago), trop leak- seen on HD, attempting 2L UF; CXR  bibasilar opacities may relate to atelectasis, aspiration, or pneumonia.     Plan of Care:    Assessment:  ESRD on HD MWF  Hx of pAF/AF with RVR; Bradycardia   HTN emergency  SHPT  Anemia of CKD    Plan:    - ordered HD MWF  - cards eval pending- supposed to be on metop, amio, eliquis  - UF 2-3L- renal diet, 1.5L fluid restriction  - no acute binder needs  - no acute BRAYAN needs    Thank you for allowing us to participate in this patient's care. We will continue to follow.    Vital Signs:  Temp Readings from Last 3 Encounters:   07/15/24 97.8 °F (36.6 °C) (Oral)   07/06/24 98.6 °F (37 °C) (Oral)   06/03/24 98.5 °F (36.9 °C) (Oral)       Pulse Readings from Last 3 Encounters:   07/15/24 (!) 48   07/11/24 (!) 50   07/06/24 (!) 58       BP Readings from Last 3 Encounters:   07/15/24 (!) 204/82   07/11/24 (!) 138/54   07/06/24 (!) 190/77       Weight:  Wt Readings from Last 3 Encounters:   07/15/24 46.3 kg (102 lb)   07/11/24 46.3 kg (102 lb)   06/28/24 48.7 kg (107 lb 5.8 oz)       Past Medical & Surgical History:  Past Medical History:   Diagnosis Date    A-fib     Anxiety     Depression     Disorder of kidney and ureter     Encephalopathy acute 1/1/2018    End stage kidney disease 6/17/2017    Gout     Hyperlipidemia     Hypertension     Moderate episode of recurrent major depressive disorder 1/17/2018    Nephropathy hypertensive, stage 5 chronic kidney disease or end stage  renal disease 6/17/2017    Obstructive pattern present on pulmonary function testing 7/28/2021    Shows moderate obstruction.    Osteopenia of multiple sites 3/9/2018    Based upon bone density measurements. Patient also has chronic kidney disease.    Stroke 11/2016       Past Surgical History:   Procedure Laterality Date    ANGIOGRAM, CORONARY, WITH LEFT HEART CATHETERIZATION N/A 2/7/2022    Procedure: Angiogram, Coronary, with Left Heart Cath;  Surgeon: Gino Leal MD;  Location: [facility] CATH/EP LAB;  Service: Cardiology;  Laterality: N/A;    CARDIAC SURGERY      stents    EYE SURGERY      WRIST SURGERY         Past Social History:  Social History     Socioeconomic History    Marital status:      Spouse name: Aria Isaac    Number of children: 3   Occupational History    Occupation: Not working   Tobacco Use    Smoking status: Never    Smokeless tobacco: Never   Substance and Sexual Activity    Alcohol use: No    Drug use: No    Sexual activity: Not Currently     Social Determinants of Health     Financial Resource Strain: Medium Risk (7/11/2024)    Overall Financial Resource Strain (CARDIA)     Difficulty of Paying Living Expenses: Somewhat hard   Food Insecurity: No Food Insecurity (7/11/2024)    Hunger Vital Sign     Worried About Running Out of Food in the Last Year: Never true     Ran Out of Food in the Last Year: Never true   Transportation Needs: No Transportation Needs (7/11/2024)    TRANSPORTATION NEEDS     Transportation : No   Physical Activity: Inactive (6/15/2022)    Exercise Vital Sign     Days of Exercise per Week: 0 days     Minutes of Exercise per Session: 0 min   Stress: Stress Concern Present (7/11/2024)    Citizen of [place] [employer] - Occupational Stress Questionnaire     Feeling of Stress : To some extent   Housing Stability: Patient Declined (6/30/2024)    Housing Stability Vital Sign     Unable to Pay for Housing in the Last Year: Patient declined      Homeless in the Last Year: Patient declined       Medications:  Scheduled Meds:  Continuous Infusions:  PRN Meds:.  Current Facility-Administered Medications:     acetaminophen, 650 mg, Oral, Q8H PRN    acetaminophen, 650 mg, Oral, Q4H PRN    aluminum-magnesium hydroxide-simethicone, 30 mL, Oral, QID PRN    dextrose 50%, 12.5 g, Intravenous, PRN    dextrose 50%, 25 g, Intravenous, PRN    glucagon (human recombinant), 1 mg, Intramuscular, PRN    glucose, 16 g, Oral, PRN    glucose, 24 g, Oral, PRN    magnesium oxide, 800 mg, Oral, PRN    magnesium oxide, 800 mg, Oral, PRN    melatonin, 6 mg, Oral, Nightly PRN    naloxone, 0.02 mg, Intravenous, PRN    ondansetron, 4 mg, Intravenous, Q6H PRN    potassium bicarbonate, 35 mEq, Oral, PRN    potassium bicarbonate, 50 mEq, Oral, PRN    potassium bicarbonate, 60 mEq, Oral, PRN    potassium, sodium phosphates, 2 packet, Oral, PRN    potassium, sodium phosphates, 2 packet, Oral, PRN    potassium, sodium phosphates, 2 packet, Oral, PRN    senna-docusate 8.6-50 mg, 1 tablet, Oral, Daily PRN    sodium chloride 0.9%, 3 mL, Intravenous, Q12H PRN  No current facility-administered medications on file prior to encounter.     Current Outpatient Medications on File Prior to Encounter   Medication Sig Dispense Refill    amiodarone (PACERONE) 200 MG Tab Take 1 tablet (200 mg total) by mouth 3 (three) times daily for 14 days, THEN 1 tablet (200 mg total) 2 (two) times daily for 7 days. 56 tablet 0    atorvastatin (LIPITOR) 40 MG tablet Take 40 mg by mouth once daily.      b complex vitamins tablet Take 1 tablet by mouth once daily.      dorzolamide-timolol 2-0.5% (COSOPT) 22.3-6.8 mg/mL ophthalmic solution Place 1 drop into both eyes 2 (two) times daily.      ELIQUIS 2.5 mg Tab Take 1 tablet (2.5 mg total) by mouth 2 (two) times daily. 60 tablet 0    latanoprost 0.005 % ophthalmic solution Place 1 drop into both eyes every evening.      metoclopramide HCl (REGLAN) 5 MG tablet Take 1 tablet  (5 mg total) by mouth 3 (three) times daily before meals. 30 tablet 1    metoprolol tartrate (LOPRESSOR) 25 MG tablet Take 0.5 tablets (12.5 mg total) by mouth 2 (two) times daily. 30 tablet 0    midodrine (PROAMATINE) 10 MG tablet Take 1 tablet (10 mg total) by mouth 3 (three) times daily with meals. 270 tablet 0    ondansetron (ZOFRAN-ODT) 4 MG TbDL Take 1 tablet (4 mg total) by mouth every 6 (six) hours as needed (nausea). 30 tablet 5    loperamide (IMODIUM A-D) 2 mg Tab Take 1 tablet (2 mg total) by mouth 4 (four) times daily as needed (diarrhea). (Patient not taking: Reported on 7/15/2024) 3 tablet 0    [DISCONTINUED] calcium acetate,phosphat bind, (PHOSLO) 667 mg capsule Take 667 mg by mouth 3 (three) times daily with meals.         Allergies:  Cyclobenzaprine, Fish containing products, Peanut, and Tramadol    Past Family History:  Reviewed; refer to Hospitalist Admission Note    Review of Systems:  Review of Systems - All 14 systems reviewed and negative, except as noted in HPI    Physical Exam:  General Appearance:    Lethargic, AAO x 3, cooperative, appears stated age   Head:    Normocephalic, atraumatic   Eyes:    PER, EOMI, and conjunctiva/sclera clear bilaterally       Mouth:   Moist mucus membranes, no thrush or oral lesions,       normal dentition   Back:     No CVA tenderness   Lungs:     Coarse   Chest wall:    No tenderness or deformity   Heart:    Regular rate and rhythm, S1 and S2 normal, no murmur, rub   or gallop   Abdomen:     Soft, non-tender, non-distended, bowel sounds active all four   quadrants, no RT or guarding, no masses, no organomegaly   Extremities:   Warm and well perfused, distal pulses are intact, no             cyanosis or peripheral edema   MSK:   No joint or muscle swelling, tenderness or deformity   Skin:   Skin color, texture, turgor normal, no rashes or lesions   Neurologic/Psychiatric:   CNII-XII intact     Results:  Lab Results   Component Value Date     07/15/2024     K 4.9 07/15/2024    CL 96 07/15/2024    CO2 32 (H) 07/15/2024    BUN 64 (H) 07/15/2024    CREATININE 9.4 (H) 07/15/2024    CALCIUM 9.8 07/15/2024    ANIONGAP 12 07/15/2024    ESTGFRAFRICA 4.7 (A) 02/08/2022    EGFRNONAA 4.0 (A) 02/08/2022       Lab Results   Component Value Date    CALCIUM 9.8 07/15/2024    PHOS 4.6 (H) 06/30/2024       Recent Labs   Lab 07/15/24  0559 07/15/24  0621   WBC 6.28  --    RBC 4.45  --    HGB 11.4*  --    HCT 39.4 36     --    MCV 89  --    MCH 25.6*  --    MCHC 28.9*  --        I have personally reviewed pertinent radiological imaging and reports from this admission.    I have spent > 70 minutes providing care for this patient for the above diagnoses. These services have included chart/data/imaging review, evaluation, exam, formulation of plan, , note preparation, and discussions with staff involved in this patient's care.    Rose Maynard MD MPH  [facility] [address] [address]  [phone] (p)  [phone] (f)   PE: CONSTITUTIONAL: Well appearing, well nourished, in no apparent distress. ENMT: Airway patent, nasal mucosa clear, mouth with normal mucosa. HEAD: NCAT EYES: PERRL, EOMI CARDIAC: RRR, no m/r/g, no pedal edema RESPIRATORY: CTA b/l, no adventitious sounds GI: Abdomen non-distended, non-tender MSK: Spine appears normal, range of motion is not limited, no muscle/joint tenderness NEURO: CNII-XII grossly intact, 5/5 strength, full sensation all extremities, gait intact SKIN: No rash

## 2024-07-15 NOTE — ASSESSMENT & PLAN NOTE
Creatine stable for now. BMP reviewed- noted Estimated Creatinine Clearance: 3.3 mL/min (A) (based on SCr of 9.4 mg/dL (H)). according to latest data. Based on current GFR, CKD stage is end stage.  Monitor UOP and serial BMP and adjust therapy as needed. Renally dose meds. Avoid nephrotoxic medications and procedures.  Nephrology consulted for hemodialysis management

## 2024-07-15 NOTE — PLAN OF CARE
[facility] - Emergency Dept  Initial Discharge Assessment       Primary Care Provider: Kalpesh Goel MD    Admission Diagnosis: Bradycardia [R00.1]    Admission Date: 7/15/2024  Expected Discharge Date:      completed discharge assessment via chart review Pt was admitted to hospital 2 weeks ago. Demographics, PCP, and insurance verified. Pt has home health with [facility]. Pt has dialysis on MWF at [facility] . Pt reports ability to complete ADLs with assistance. Pt verbalized plan to discharge home via family transport. Pt has no other needs to be addressed at this time.    Transition of Care Barriers: None    Payor: HU[employer] MANAGED MEDICARE / Plan: [employer] MEDICARE HMO / Product Type: Capitation /     Extended Emergency Contact Information  Primary Emergency Contact: Raffi Isaac  Address: [address] [place] [address] [facility]  Home Phone: [phone]  Mobile Phone: [phone]  Relation: Son   needed? No  Secondary Emergency Contact: Marcelo Isaac  Mobile Phone: [phone]  Relation: Son    Discharge Plan A: Home Health  Discharge Plan B: Home Health      [facility] [address] [place] [address] - [address]  [address]  [facility] [address]  Phone: [phone] Fax: [phone]    [facility] - [place] - [address]  [address] [facility] [address]  Phone: [phone] Fax: [phone]      Initial Assessment (most recent)       Adult Discharge Assessment - 07/15/24 0928          Discharge Assessment    Assessment Type Discharge Planning Assessment     Confirmed/corrected address, phone number and insurance Yes     Confirmed Demographics Correct on Facesheet     Source of Information health record     Does patient/caregiver understand observation status Yes     Communicated ILAN with patient/caregiver Date not available/Unable to determine     People in Home child(aron), adult     Do you expect to  return to your current living situation? Yes     Do you have help at home or someone to help you manage your care at home? Yes     Who are your caregiver(s) and their phone number(s)? Raffi Isaac (Son)  [phone]     Walking or Climbing Stairs Difficulty yes     Walking or Climbing Stairs ambulation difficulty, requires equipment     Dressing/Bathing Difficulty yes     Dressing/Bathing bathing difficulty, assistance 1 person;bathing difficulty, requires equipment     Home Accessibility wheelchair accessible     Home Layout Able to live on 1st floor     Equipment Currently Used at Home power chair     Readmission within 30 days? Yes     Patient currently being followed by outpatient case management? No     Do you currently have service(s) that help you manage your care at home? Yes     Name and Contact number of agency [facility]     Is the pt/caregiver preference to resume services with current agency Yes     Do you take prescription medications? Yes     Do you have prescription coverage? Yes     Coverage Payor: HUMANA MANAGED MEDICARE - HU[employer]NA MEDICARE HMO - CAPITATED     Do you have any problems affording any of your prescribed medications? No     Is the patient taking medications as prescribed? yes     Who is going to help you get home at discharge? IsaacPanteraRaffi (Son)  [phone]     How do you get to doctors appointments? family or friend will provide     Are you on dialysis? Yes     Dialysis Name and Scheduled days MWF [facility] of Dr. Valero     Do you take coumadin? No     Discharge Plan A Home Health     Discharge Plan B Home Health     Transition of Care Barriers None

## 2024-07-15 NOTE — HPI
84 year old female with PMH significant for ESRD on HD MWF, A-fib on Eliquis, HTN, CVA presented to the ER with chief complaint of shortness of breath and weakness.  Patient reports that her last HD session was this past Friday 7/12/24.  Over the weekend, she began to feel progressively more short of breath with associated generalized weakness and fatigue.  She woke up short of breath this morning which prompted ER evaluation rather than going to her HD appointment.  Patient denies chest pain, palpitations, abdominal pain, vomiting.  Does endorse mild dizziness and nausea.  Denies syncope.  In the ER, patient found to be markedly bradycardic with HR 30-40s.  BP stable.  Mentation at baseline.  Troponin mildly elevated at 32.7 with repeat 31.8.  BNP elevated.  Electrolytes stable.  Patient was recently admitted to the hospital and went into SVT/afib RVR during dialysis; cardiology was consulted and she had a negative stress test and was discharged on amiodarone and metoprolol.  Will admit patient for further evaluation and treatment.

## 2024-07-15 NOTE — H&P
[facility] - Emergency Dept  Hospital Medicine  History & Physical    Patient Name: Tyra Isaac  MRN: [id number]  Patient Class: OP- Observation  Admission Date: 7/15/2024  Attending Physician: Lizz Yap MD   Primary Care Provider: Kalpesh Goel MD         Patient information was obtained from patient and ER records.     Subjective:     Principal Problem:<principal problem not specified>    Chief Complaint:   Chief Complaint   Patient presents with    Chest Pain    Shortness of Breath        HPI: 84 year old female with PMH significant for ESRD on HD MWF, A-fib on Eliquis, HTN, CVA presented to the ER with chief complaint of shortness of breath and weakness.  Patient reports that her last HD session was this past Friday 7/12/24.  Over the weekend, she began to feel progressively more short of breath with associated generalized weakness and fatigue.  She woke up short of breath this morning which prompted ER evaluation rather than going to her HD appointment.  Patient denies chest pain, palpitations, abdominal pain, vomiting.  Does endorse mild dizziness and nausea.  Denies syncope.  In the ER, patient found to be markedly bradycardic with HR 30-40s.  BP stable.  Mentation at baseline.  Troponin mildly elevated at 32.7 with repeat 31.8.  BNP elevated.  Electrolytes stable.  Patient was recently admitted to the hospital and went into SVT/afib RVR during dialysis; cardiology was consulted and she had a negative stress test and was discharged on amiodarone and metoprolol.  Will admit patient for further evaluation and treatment.     Past Medical History:   Diagnosis Date    A-fib     Anxiety     Depression     Disorder of kidney and ureter     Encephalopathy acute 1/1/2018    End stage kidney disease 6/17/2017    Gout     Hyperlipidemia     Hypertension     Moderate episode of recurrent major depressive disorder 1/17/2018    Nephropathy hypertensive, stage 5 chronic kidney disease or end  stage renal disease 6/17/2017    Obstructive pattern present on pulmonary function testing 7/28/2021    Shows moderate obstruction.    Osteopenia of multiple sites 3/9/2018    Based upon bone density measurements. Patient also has chronic kidney disease.    Stroke 11/2016       Past Surgical History:   Procedure Laterality Date    ANGIOGRAM, CORONARY, WITH LEFT HEART CATHETERIZATION N/A 2/7/2022    Procedure: Angiogram, Coronary, with Left Heart Cath;  Surgeon: Gino Leal MD;  Location: [facility] CATH[facility]EP LAB;  Service: Cardiology;  Laterality: N/A;    CARDIAC SURGERY      stents    EYE SURGERY      WRIST SURGERY         Review of patient's allergies indicates:   Allergen Reactions    Cyclobenzaprine     Fish containing products Hives    Peanut Other (See Comments)    Tramadol Itching       No current facility-administered medications on file prior to encounter.     Current Outpatient Medications on File Prior to Encounter   Medication Sig    amiodarone (PACERONE) 200 MG Tab Take 1 tablet (200 mg total) by mouth 3 (three) times daily for 14 days, THEN 1 tablet (200 mg total) 2 (two) times daily for 7 days.    atorvastatin (LIPITOR) 40 MG tablet Take 40 mg by mouth once daily.    b complex vitamins tablet Take 1 tablet by mouth once daily.    dorzolamide-timolol 2-0.5% (COSOPT) 22.3-6.8 mg/mL ophthalmic solution Place 1 drop into both eyes 2 (two) times daily.    ELIQUIS 2.5 mg Tab Take 1 tablet (2.5 mg total) by mouth 2 (two) times daily.    latanoprost 0.005 % ophthalmic solution Place 1 drop into both eyes every evening.    metoclopramide HCl (REGLAN) 5 MG tablet Take 1 tablet (5 mg total) by mouth 3 (three) times daily before meals.    metoprolol tartrate (LOPRESSOR) 25 MG tablet Take 0.5 tablets (12.5 mg total) by mouth 2 (two) times daily.    midodrine (PROAMATINE) 10 MG tablet Take 1 tablet (10 mg total) by mouth 3 (three) times daily with meals.    ondansetron (ZOFRAN-ODT) 4 MG TbDL Take 1 tablet (4 mg  total) by mouth every 6 (six) hours as needed (nausea).    loperamide (IMODIUM A-D) 2 mg Tab Take 1 tablet (2 mg total) by mouth 4 (four) times daily as needed (diarrhea). (Patient not taking: Reported on 7/15/2024)    [DISCONTINUED] calcium acetate,phosphat bind, (PHOSLO) 667 mg capsule Take 667 mg by mouth 3 (three) times daily with meals.     Family History       Problem Relation (Age of Onset)    Cancer Father    Heart disease Mother          Tobacco Use    Smoking status: Never    Smokeless tobacco: Never   Substance and Sexual Activity    Alcohol use: No    Drug use: No    Sexual activity: Not Currently     Review of Systems   Constitutional:  Positive for fatigue. Negative for chills and fever.   Respiratory:  Positive for cough and shortness of breath.    Cardiovascular:  Negative for chest pain and palpitations.   Gastrointestinal:  Positive for nausea. Negative for abdominal pain and vomiting.   Neurological:  Positive for dizziness. Negative for syncope and headaches.   Psychiatric/Behavioral:  Negative for confusion.      Objective:     Vital Signs (Most Recent):  Temp: 97.8 °F (36.6 °C) (07/15/24 0546)  Pulse: (!) 41 (07/15/24 0621)  Resp: (!) 33 (07/15/24 0621)  BP: (!) 196/81 (07/15/24 0600)  SpO2: 96 % (07/15/24 0621) Vital Signs (24h Range):  Temp:  [97.8 °F (36.6 °C)] 97.8 °F (36.6 °C)  Pulse:  [41-42] 41  Resp:  [18-33] 33  SpO2:  [96 %-98 %] 96 %  BP: (180-196)/(75-81) 196/81     Weight: 46.3 kg (102 lb)  Body mass index is 16.97 kg/m².     Physical Exam  Vitals and nursing note reviewed.   Constitutional:       General: She is not in acute distress.     Comments: frail   HENT:      Mouth/Throat:      Mouth: Mucous membranes are moist.   Cardiovascular:      Rate and Rhythm: Bradycardia present.   Pulmonary:      Effort: Pulmonary effort is normal. No respiratory distress.      Breath sounds: Normal breath sounds.      Comments: Diminished throughout  Abdominal:      Palpations: Abdomen is soft.       Tenderness: There is no abdominal tenderness.   Musculoskeletal:      Right lower leg: No edema.      Left lower leg: No edema.   Skin:     General: Skin is warm and dry.   Neurological:      Mental Status: She is alert and oriented to person, place, and time. Mental status is at baseline.                Significant Labs: All pertinent labs within the past 24 hours have been reviewed.  CBC:   Recent Labs   Lab 07/15/24  0559 07/15/24  0621   WBC 6.28  --    HGB 11.4*  --    HCT 39.4 36     --      CMP:   Recent Labs   Lab 07/15/24  0559      K 4.9   CL 96   CO2 32*      BUN 64*   CREATININE 9.4*   CALCIUM 9.8   PROT 7.7   ALBUMIN 4.2   BILITOT 0.5   ALKPHOS 81   AST 20   ALT 31   ANIONGAP 12     Magnesium:   Recent Labs   Lab 07/15/24  0559   MG 2.3     Troponin:   Recent Labs   Lab 07/15/24  0559 07/15/24  0828   TROPONINIHS 32.7* 31.8*       Significant Imaging: I have reviewed all pertinent imaging results/findings within the past 24 hours.  Assessment/Plan:     Bradycardia  Hemodynamically stable, alert   Hold home amiodarone and metoprolol   Continuous cardiac monitoring   Consult Cardiology       CAD s/p PCI  Patient with known CAD s/p stent placement, which is controlled Will continue Statin and monitor for S/Sx of angina/ACS. Continue to monitor on telemetry.     Paroxysmal atrial fibrillation  Patient with atrial fibrillation which is controlled currently with Beta Blocker and Amiodarone. Patient is currently in sinus bradycardia.[id number] Score: 4. Anticoagulation indicated. Anticoagulation done with renally dosed Eliquis .  Holding home amiodarone/beta blocker at this time due to bradycardia   Cardiology consulted     End stage renal disease on dialysis  Creatine stable for now. BMP reviewed- noted Estimated Creatinine Clearance: 3.3 mL/min (A) (based on SCr of 9.4 mg/dL (H)). according to latest data. Based on current GFR, CKD stage is end stage.  Monitor UOP and serial BMP and  adjust therapy as needed. Renally dose meds. Avoid nephrotoxic medications and procedures.  Nephrology consulted for hemodialysis management       VTE Risk Mitigation (From admission, onward)           Ordered     IP VTE HIGH RISK PATIENT  Once         07/15/24 0813     Place sequential compression device  Until discontinued         07/15/24 0813                         On 07/15/2024, patient should be placed in hospital observation services under my care in collaboration with Dr Yap.           Jacquie Ascencio NP  Department of Hospital Medicine  [facility] - Emergency Dept

## 2024-07-15 NOTE — ED NOTES
Pt taken to HD via stretcher by nurse and tech. Pt c/o nausea on the transit to dialysis room, prn zofran given by nurse. NAD. Report given to AYESHA Kebede RN.

## 2024-07-15 NOTE — CONSULTS
"[facility]  Department of Cardiology  Consult Note      PATIENT NAME: Tyra Isaac  MRN: [id number]  TODAY'S DATE: 07/15/2024  ADMIT DATE: 7/15/2024                          CONSULT REQUESTED BY: Lizz Yap MD    SUBJECTIVE     PRINCIPAL PROBLEM: <principal problem not specified>      REASON FOR CONSULT:  Bradycardia      HPI:  Patient is an 84-year-old  female who presented to the ER with complaints of chest pain and shortness of breath.  She states I could not breathe and had tightness in my chest."She has also been coughing and recently medicated with cough syrup.  She was anxious to get to dialysis today as she has not had it since Friday.  She has end-stage renal disease on hemodialysis Monday Wednesday Friday and sees Dr. Valero.  Patient's daughter is present and states a new medication that she was started on at last hospitalization did not agree well with her.  Hospital discharge as below notes discharging her on amiodarone taper but there are also notes that she was on Multaq at home as well.  She was also on Lopressor 12.5 mg b.i.d. at home.  Patient's heart rate on arrival to the emergency room was in the 30s-40s with prolonged QT.    Patient recently discharged from hospital  Hospital Course from 06/27/2024-07/06/2024:   "Patient was admitted with chest pain and hyperkalemia.  Her EKG was nonischemic and her troponins were noted to be elevated, which is baseline for her 2/2 ESRD. She remained chest pain free during her stay.  Nephrology was consulted for routine dialysis.  She was maintained on telemetry and cardiology was consulted.  She received medications to shift her potassium and dialysis was ordered.  Her dialysis was cut short due to syncopal episode with hypotension.  She was monitored overnight.  She went for stress testing, which was negative for reversible ischemia.  She had another episode of severe tachycardia after dialysis and needed Lopressor " "and Cardizem IV and moved to ICU.  On 7/1 during dialysis patient became tachycardic and hypotensive in afib RVR. She was cardioverted and started on amio drip and phenylephrine drip.  Palliative Care was consulted, patient wishes to remain full code at this time until she discusses it further with her family.  Patient's heart rate and BP remained stable.  The amiodarone drip was transitioned to PO and patient was transferred out of ICU on 7/4/24.  Patient had hemodialysis 7/5/24 and tolerated this well.  PT/OT was consulted who recommended home health which was ordered.  Patient seen and examined on day of discharge and deemed stable for discharge home with home health.  She will need to follow up with her PCP, Cardiologist, and Nephrologist in 1 week.  Patient agreeable with plan and eager for discharge home. "        Review of patient's allergies indicates:   Allergen Reactions    Cyclobenzaprine     Fish containing products Hives    Peanut Other (See Comments)    Tramadol Itching       Past Medical History:   Diagnosis Date    A-fib     Anxiety     Depression     Disorder of kidney and ureter     Encephalopathy acute 1/1/2018    End stage kidney disease 6/17/2017    Gout     Hyperlipidemia     Hypertension     Moderate episode of recurrent major depressive disorder 1/17/2018    Nephropathy hypertensive, 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    Osteopenia of multiple sites 3/9/2018    Based upon bone density measurements. Patient also has chronic kidney disease.    Stroke 11/2016     Past Surgical History:   Procedure Laterality Date    ANGIOGRAM, CORONARY, WITH LEFT HEART CATHETERIZATION N/A 2/7/2022    Procedure: Angiogram, Coronary, with Left Heart Cath;  Surgeon: Gino Leal MD;  Location: [facility] CATH/EP LAB;  Service: Cardiology;  Laterality: N/A;    CARDIAC SURGERY      " stents    EYE SURGERY      WRIST SURGERY       Social History     Tobacco Use    Smoking status: Never    Smokeless tobacco: Never   Substance Use Topics    Alcohol use: No    Drug use: No        REVIEW OF SYSTEMS  Negative except as mentioned in HPI    OBJECTIVE     VITAL SIGNS (Most Recent)  Temp: 97.8 °F (36.6 °C) (07/15/24 0546)  Pulse: (!) 41 (07/15/24 0621)  Resp: (!) 33 (07/15/24 0621)  BP: (!) 196/81 (07/15/24 0600)  SpO2: 96 % (07/15/24 0621)    VENTILATION STATUS  Resp: (!) 33 (07/15/24 0621)  SpO2: 96 % (07/15/24 0621)           I & O (Last 24H):No intake or output data in the 24 hours ending 07/15/24 1000    WEIGHTS  Wt Readings from Last 3 Encounters:   07/15/24 0542 46.3 kg (102 lb)   07/11/24 1030 46.3 kg (102 lb)   06/28/24 0300 48.7 kg (107 lb 5.8 oz)   06/27/24 2343 47.6 kg (105 lb)       PHYSICAL EXAM    GENERAL:  Elderly thin female resting in bed in no apparent distress.  HEENT: Normocephalic.    NECK: No JVD.   CARDIAC: Regular bradycardic rate and rhythm.  Sinus bradycardia noted on telemetry; + murmur  CHEST ANATOMY: normal.   LUNGS:  Diminished at bases   ABDOMEN: Soft .  Normal bowel sounds.    EXTREMITIES:  Right arm fistula for dialysis in place.  Mild generalized edema  CENTRAL NERVOUS SYSTEM: AAO x 3  SKIN: No rash     HOME MEDICATIONS:  No current facility-administered medications on file prior to encounter.     Current Outpatient Medications on File Prior to Encounter   Medication Sig Dispense Refill    amiodarone (PACERONE) 200 MG Tab Take 1 tablet (200 mg total) by mouth 3 (three) times daily for 14 days, THEN 1 tablet (200 mg total) 2 (two) times daily for 7 days. 56 tablet 0    atorvastatin (LIPITOR) 40 MG tablet Take 40 mg by mouth once daily.      b complex vitamins tablet Take 1 tablet by mouth once daily.      dorzolamide-timolol 2-0.5% (COSOPT) 22.3-6.8 mg/mL ophthalmic solution Place 1 drop into both eyes 2 (two) times daily.      ELIQUIS 2.5 mg Tab Take 1 tablet (2.5 mg  total) by mouth 2 (two) times daily. 60 tablet 0    latanoprost 0.005 % ophthalmic solution Place 1 drop into both eyes every evening.      metoclopramide HCl (REGLAN) 5 MG tablet Take 1 tablet (5 mg total) by mouth 3 (three) times daily before meals. 30 tablet 1    metoprolol tartrate (LOPRESSOR) 25 MG tablet Take 0.5 tablets (12.5 mg total) by mouth 2 (two) times daily. 30 tablet 0    midodrine (PROAMATINE) 10 MG tablet Take 1 tablet (10 mg total) by mouth 3 (three) times daily with meals. 270 tablet 0    ondansetron (ZOFRAN-ODT) 4 MG TbDL Take 1 tablet (4 mg total) by mouth every 6 (six) hours as needed (nausea). 30 tablet 5    loperamide (IMODIUM A-D) 2 mg Tab Take 1 tablet (2 mg total) by mouth 4 (four) times daily as needed (diarrhea). (Patient not taking: Reported on 7/15/2024) 3 tablet 0    [DISCONTINUED] calcium acetate,phosphat bind, (PHOSLO) 667 mg capsule Take 667 mg by mouth 3 (three) times daily with meals.         SCHEDULED MEDS:   atropine           CONTINUOUS INFUSIONS:    PRN MEDS:  Current Facility-Administered Medications:     acetaminophen, 650 mg, Oral, Q8H PRN    acetaminophen, 650 mg, Oral, Q4H PRN    aluminum-magnesium hydroxide-simethicone, 30 mL, Oral, QID PRN    atropine, , ,     dextrose 50%, 12.5 g, Intravenous, PRN    dextrose 50%, 25 g, Intravenous, PRN    glucagon (human recombinant), 1 mg, Intramuscular, PRN    glucose, 16 g, Oral, PRN    glucose, 24 g, Oral, PRN    magnesium oxide, 800 mg, Oral, PRN    magnesium oxide, 800 mg, Oral, PRN    melatonin, 6 mg, Oral, Nightly PRN    naloxone, 0.02 mg, Intravenous, PRN    ondansetron, 4 mg, Intravenous, Q6H PRN    potassium bicarbonate, 35 mEq, Oral, PRN    potassium bicarbonate, 50 mEq, Oral, PRN    potassium bicarbonate, 60 mEq, Oral, PRN    potassium, sodium phosphates, 2 packet, Oral, PRN    potassium, sodium phosphates, 2 packet, Oral, PRN    potassium, sodium phosphates, 2 packet, Oral, PRN    senna-docusate 8.6-50 mg, 1 tablet,  "Oral, Daily PRN    sodium chloride 0.9%, 3 mL, Intravenous, Q12H PRN    LABS AND DIAGNOSTICS     CBC LAST 3 DAYS  Recent Labs   Lab 07/15/24  0559 07/15/24  0621   WBC 6.28  --    RBC 4.45  --    HGB 11.4*  --    HCT 39.4 36   MCV 89  --    MCH 25.6*  --    MCHC 28.9*  --    RDW 17.3*  --      --    MPV 9.9  --    GRAN 59.9  3.8  --    LYMPH 18.6  1.2  --    MONO 11.8  0.7  --    BASO 0.03  --    NRBC 0  --        COAGULATION LAST 3 DAYS  Recent Labs   Lab 07/15/24  0559   INR 1.2   APTT 34.2*       CHEMISTRY LAST 3 DAYS  Recent Labs   Lab 07/15/24  0559 07/15/24  0621     --    K 4.9  --    CL 96  --    CO2 32*  --    ANIONGAP 12  --    BUN 64*  --    CREATININE 9.4*  --      --    CALCIUM 9.8  --    PH  --  7.345*   MG 2.3  --    ALBUMIN 4.2  --    PROT 7.7  --    ALKPHOS 81  --    ALT 31  --    AST 20  --    BILITOT 0.5  --        CARDIAC PROFILE LAST 3 DAYS  Recent Labs   Lab 07/15/24  0559   BNP 3,875*       ENDOCRINE LAST 3 DAYS  No results for input(s): "TSH", "PROCAL" in the last 168 hours.    LAST ARTERIAL BLOOD GAS  ABG  Recent Labs   Lab 07/15/24  0621   PH 7.345*   PO2 25*   PCO2 58.3*   HCO3 31.8*   BE 6*       LAST 7 DAYS MICROBIOLOGY   Microbiology Results (last 7 days)       ** No results found for the last 168 hours. **            MOST RECENT IMAGING  X-Ray Chest AP Portable  Narrative: EXAMINATION:  XR CHEST AP PORTABLE    CLINICAL HISTORY:  Chest Pain;    TECHNIQUE:  Single frontal view of the chest was performed.    COMPARISON:  Chest radiograph performed 07/01/2024, 13:12 hours.    FINDINGS:  Monitoring leads overlie the chest.  Grossly unchanged cardiomediastinal contours.    Chronic interstitial coarsening is noted.    Ill-defined bibasilar opacities.    No definite pneumothorax or large volume pleural effusion.  No acute findings in the visualized abdomen.    Osseous and soft tissue structures appear without definite acute change.  Impression: Ill-defined bibasilar " opacities may relate to atelectasis, aspiration, or pneumonia.    Electronically signed by: Phillip Magaña  Date:    07/15/2024  Time:    06:54      ECHOCARDIOGRAM RESULTS (last 5)  Results for orders placed during the hospital encounter of 06/27/24    Echo    Interpretation Summary    Left Ventricle: The left ventricle is normal in size. Normal wall thickness. There is concentric remodeling. There is normal systolic function with a visually estimated ejection fraction of 60 - 65%. Biplane (2D) method of discs ejection fraction is 64%.    Right Ventricle: Normal right ventricular cavity size. Wall thickness is normal. Systolic function is normal.    Left Atrium: Left atrium is mildly dilated.    Aortic Valve: The aortic valve is a trileaflet valve. Mildly calcified left, right and noncoronary cusps. Mildly restricted motion. There is mild stenosis. Aortic valve area by VTI is 1.80 cm². Aortic valve peak velocity is 1.22 m/s. Mean gradient is 3 mmHg. The dimensionless index is 0.79.    Mitral Valve: There is moderate mitral annular calcification present. There is severe stenosis. The mean pressure gradient across the mitral valve is 9 mmHg at a heart rate of  bpm. There is mild regurgitation with a centrally directed jet.      Results for orders placed during the hospital encounter of 01/04/24    Echo    Interpretation Summary    Left Ventricle: The left ventricle is normal in size. Normal wall thickness. Normal wall motion. There is normal systolic function with a visually estimated ejection fraction of 60 - 65%. There is diastolic dysfunction.    Right Ventricle: Normal right ventricular cavity size. Wall thickness is normal. Right ventricle wall motion  is normal. Systolic function is normal.    Left Atrium: Left atrium is severely dilated.    Mitral Valve: There is moderate bileaflet sclerosis. There is moderate mitral annular calcification present. There is mild to moderate regurgitation with a centrally  directed jet.    Tricuspid Valve: There is mild to moderate regurgitation.    Pulmonary Artery: There is mild pulmonary hypertension. The estimated pulmonary artery systolic pressure is 41 mmHg.    IVC/SVC: Normal venous pressure at 3 mmHg.      Results for orders placed during the hospital encounter of 03/20/23    Echo    Interpretation Summary  · The left ventricle is normal in size with moderate concentric hypertrophy and normal systolic function.  · The estimated ejection fraction is 57%.  · Normal left ventricular diastolic function.  · Normal right ventricular size with normal right ventricular systolic function.  · Moderate to severe tricuspid regurgitation.  · Mild-to-moderate mitral regurgitation.  · Moderate left atrial enlargement.  · Mild right atrial enlargement.  · There is mild pulmonary hypertension.      Results for orders placed during the hospital encounter of 03/06/23    Echo    Interpretation Summary  · The left ventricle is normal in size with normal systolic function.  · The estimated ejection fraction is 60%.  · Grade II left ventricular diastolic dysfunction.  · Normal right ventricular size with normal right ventricular systolic function.  · Moderate left atrial enlargement.  · Mild-to-moderate mitral regurgitation.  · Moderate to severe tricuspid regurgitation.  · There is mild pulmonary hypertension.  · There is mild aortic valve stenosis.  · Aortic valve area is 1.78 cm2; peak velocity is 2.01 m/s; mean gradient is 9 mmHg.      Results for orders placed during the hospital encounter of 01/26/22    Echo    Interpretation Summary  · The left ventricle is normal in size with mild concentric hypertrophy and normal systolic function.  · The estimated ejection fraction is 65%.  · Grade II left ventricular diastolic dysfunction.  · Atrial fibrillation not observed.  · Normal right ventricular size with normal right ventricular systolic function.  · Moderate to severe left atrial enlargement.  ·  Mild right atrial enlargement.  · Mild mitral regurgitation.  · Mild to moderate tricuspid regurgitation.  · Normal central venous pressure (3 mmHg).  · The estimated PA systolic pressure is 36 mmHg.  · Mildly elevated gradient accross mitral valve of around 6 mmHg at HR of 64 bpm. MVA by pressure half time is normal, however this can be inaccurate.      CURRENT/PREVIOUS VISIT EKG  Results for orders placed or performed during the hospital encounter of 07/15/24   EKG 12-lead    Collection Time: 07/15/24  5:48 AM   Result Value Ref Range    QRS Duration 106 ms    [facility] QTC Calculation 500 ms    Narrative    Test Reason : R07.9,    Vent. Rate : 046 BPM     Atrial Rate : 083 BPM     P-R Int : 000 ms          QRS Dur : 106 ms      QT Int : 572 ms       P-R-T Axes : 000 -38 016 degrees     QTc Int : 500 ms    Undetermined rhythm  Left axis deviation  Incomplete left bundle branch block  Minimal voltage criteria for LVH, may be normal variant ( [place] product )  Nonspecific T wave abnormality  Prolonged QT  Abnormal ECG  When compared with ECG of 29-JUN-2024 14:38,  Current undetermined rhythm precludes rhythm comparison, needs review  Incomplete left bundle branch block is now Present    Referred By: [email or web address]   SELF           Confirmed By:            ASSESSMENT/PLAN:     Active Hospital Problems    Diagnosis    Bradycardia    Paroxysmal atrial fibrillation    CAD s/p PCI    End stage renal disease on dialysis       ASSESSMENT & PLAN:   Bradycardia  Paroxysmal atrial fibrillation  CAD,   History of PCI  Severe mitral valve stenosis  ESRD on HD  Hypertension    RECOMMENDATIONS:  Mild troponin elevation chronic, stable from baseline, likely 2/2 ESRD  Echo in June 20, 2024 showed EF 60-65% and severe mitral stenosis  Continue to hold amiodarone and metoprolol for now  Continue to observe closely on continuous telemetry- heart rate is in the 40s, sinus bradycardia, blood pressure is high  BNP greater than 3800 today.   Creatinine 9.4/BUN 64  Patient needs hemodialysis today; fluid management per Nephrology  Continue Eliquis for anticoagulation for PAF  Nuclear stress test in June 2024- negative for reversible ischemia  TSH was normal in June    Tsering Bird NP  [facility]  Department of Cardiology  Date of Service: 07/15/2024        I have personally interviewed and examined the patient, I have reviewed the Nurse Practitioner's history and physical, assessment, and plan. I agree with the findings and plan.  HOLD METOPROLORL AMIODARONE  DIALYSIS  READDRESS MEDICATIONS BEFORE DISCHARGE    Dr. Meche GUTHRIE  [facility]  Department of Cardiology  Date of Service: 07/15/2024  10:00 AM

## 2024-07-15 NOTE — SUBJECTIVE & OBJECTIVE
Past Medical History:   Diagnosis Date    A-fib     Anxiety     Depression     Disorder of kidney and ureter     Encephalopathy acute 1/1/2018    End stage kidney disease 6/17/2017    Gout     Hyperlipidemia     Hypertension     Moderate episode of recurrent major depressive disorder 1/17/2018    Nephropathy hypertensive, 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    Osteopenia of multiple sites 3/9/2018    Based upon bone density measurements. Patient also has chronic kidney disease.    Stroke 11/2016       Past Surgical History:   Procedure Laterality Date    ANGIOGRAM, CORONARY, WITH LEFT HEART CATHETERIZATION N/A 2/7/2022    Procedure: Angiogram, Coronary, with Left Heart Cath;  Surgeon: Gino Leal MD;  Location: [facility] CATH/EP LAB;  Service: Cardiology;  Laterality: N/A;    CARDIAC SURGERY      stents    EYE SURGERY      WRIST SURGERY         Review of patient's allergies indicates:   Allergen Reactions    Cyclobenzaprine     Fish containing products Hives    Peanut Other (See Comments)    Tramadol Itching       No current facility-administered medications on file prior to encounter.     Current Outpatient Medications on File Prior to Encounter   Medication Sig    amiodarone (PACERONE) 200 MG Tab Take 1 tablet (200 mg total) by mouth 3 (three) times daily for 14 days, THEN 1 tablet (200 mg total) 2 (two) times daily for 7 days.    atorvastatin (LIPITOR) 40 MG tablet Take 40 mg by mouth once daily.    b complex vitamins tablet Take 1 tablet by mouth once daily.    dorzolamide-timolol 2-0.5% (COSOPT) 22.3-6.8 mg/mL ophthalmic solution Place 1 drop into both eyes 2 (two) times daily.    ELIQUIS 2.5 mg Tab Take 1 tablet (2.5 mg total) by mouth 2 (two) times daily.    latanoprost 0.005 % ophthalmic solution Place 1 drop into both eyes every evening.    metoclopramide HCl (REGLAN) 5 MG  tablet Take 1 tablet (5 mg total) by mouth 3 (three) times daily before meals.    metoprolol tartrate (LOPRESSOR) 25 MG tablet Take 0.5 tablets (12.5 mg total) by mouth 2 (two) times daily.    midodrine (PROAMATINE) 10 MG tablet Take 1 tablet (10 mg total) by mouth 3 (three) times daily with meals.    ondansetron (ZOFRAN-ODT) 4 MG TbDL Take 1 tablet (4 mg total) by mouth every 6 (six) hours as needed (nausea).    loperamide (IMODIUM A-D) 2 mg Tab Take 1 tablet (2 mg total) by mouth 4 (four) times daily as needed (diarrhea). (Patient not taking: Reported on 7/15/2024)    [DISCONTINUED] calcium acetate,phosphat bind, (PHOSLO) 667 mg capsule Take 667 mg by mouth 3 (three) times daily with meals.     Family History       Problem Relation (Age of Onset)    Cancer Father    Heart disease Mother          Tobacco Use    Smoking status: Never    Smokeless tobacco: Never   Substance and Sexual Activity    Alcohol use: No    Drug use: No    Sexual activity: Not Currently     Review of Systems   Constitutional:  Positive for fatigue. Negative for chills and fever.   Respiratory:  Positive for cough and shortness of breath.    Cardiovascular:  Negative for chest pain and palpitations.   Gastrointestinal:  Positive for nausea. Negative for abdominal pain and vomiting.   Neurological:  Positive for dizziness. Negative for syncope and headaches.   Psychiatric/Behavioral:  Negative for confusion.      Objective:     Vital Signs (Most Recent):  Temp: 97.8 °F (36.6 °C) (07/15/24 0546)  Pulse: (!) 41 (07/15/24 0621)  Resp: (!) 33 (07/15/24 0621)  BP: (!) 196/81 (07/15/24 0600)  SpO2: 96 % (07/15/24 0621) Vital Signs (24h Range):  Temp:  [97.8 °F (36.6 °C)] 97.8 °F (36.6 °C)  Pulse:  [41-42] 41  Resp:  [18-33] 33  SpO2:  [96 %-98 %] 96 %  BP: (180-196)/(75-81) 196/81     Weight: 46.3 kg (102 lb)  Body mass index is 16.97 kg/m².     Physical Exam  Vitals and nursing note reviewed.   Constitutional:       General: She is not in acute  distress.     Comments: frail   HENT:      Mouth/Throat:      Mouth: Mucous membranes are moist.   Cardiovascular:      Rate and Rhythm: Bradycardia present.   Pulmonary:      Effort: Pulmonary effort is normal. No respiratory distress.      Breath sounds: Normal breath sounds.      Comments: Diminished throughout  Abdominal:      Palpations: Abdomen is soft.      Tenderness: There is no abdominal tenderness.   Musculoskeletal:      Right lower leg: No edema.      Left lower leg: No edema.   Skin:     General: Skin is warm and dry.   Neurological:      Mental Status: She is alert and oriented to person, place, and time. Mental status is at baseline.                Significant Labs: All pertinent labs within the past 24 hours have been reviewed.  CBC:   Recent Labs   Lab 07/15/24  0559 07/15/24  0621   WBC 6.28  --    HGB 11.4*  --    HCT 39.4 36     --      CMP:   Recent Labs   Lab 07/15/24  0559      K 4.9   CL 96   CO2 32*      BUN 64*   CREATININE 9.4*   CALCIUM 9.8   PROT 7.7   ALBUMIN 4.2   BILITOT 0.5   ALKPHOS 81   AST 20   ALT 31   ANIONGAP 12     Magnesium:   Recent Labs   Lab 07/15/24  0559   MG 2.3     Troponin:   Recent Labs   Lab 07/15/24  0559 07/15/24  0828   TROPONINIHS 32.7* 31.8*       Significant Imaging: I have reviewed all pertinent imaging results/findings within the past 24 hours.

## 2024-07-15 NOTE — ED PROVIDER NOTES
Encounter Date: 7/15/2024       History     Chief Complaint   Patient presents with    Chest Pain    Shortness of Breath     Chief complaint is shortness of breath.  This nice 84-year-old female denies any chest pain.  She is a dialysis patient Monday Wednesday Friday patient of Dr. Valero she states she became short of breath about a day or so ago.  She is postop dialysis this morning at 6:15 a.m..  She denies any fever chills.  As I mentioned no chest pain.  No diarrhea no nausea vomiting.        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    Osteopenia of multiple sites 3/9/2018    Based upon bone density measurements. Patient also has chronic kidney disease.    Stroke 11/2016     Past Surgical History:   Procedure Laterality Date    ANGIOGRAM, CORONARY, WITH LEFT HEART CATHETERIZATION N/A 2/7/2022    Procedure: Angiogram, Coronary, with Left Heart Cath;  Surgeon: Gino Leal MD;  Location: [facility] CATH/EP LAB;  Service: Cardiology;  Laterality: N/A;    CARDIAC SURGERY      stents    EYE SURGERY      WRIST SURGERY       Family History   Problem Relation Name Age of Onset    Heart disease Mother      Cancer Father       Social History     Tobacco Use    Smoking status: Never    Smokeless tobacco: Never   Substance Use Topics    Alcohol use: No    Drug use: No     Review of Systems   Constitutional:   Negative for chills and fever.   HENT:  Negative for ear pain, rhinorrhea and sore throat.    Eyes:  Negative for pain and visual disturbance.   Respiratory:  Positive for shortness of breath. Negative for cough.    Cardiovascular:  Negative for chest pain and palpitations.   Gastrointestinal:  Negative for abdominal pain, constipation, diarrhea, nausea and vomiting.   Genitourinary:  Negative for dysuria, frequency, hematuria and urgency.   Musculoskeletal:  Negative for back pain, joint swelling and myalgias.   Skin:  Negative for rash.   Neurological:  Negative for dizziness, seizures, weakness and headaches.   Psychiatric/Behavioral:  Negative for dysphoric mood. The patient is not nervous/anxious.        Physical Exam     Initial Vitals   BP Pulse Resp Temp SpO2   07/15/24 0542 07/15/24 0542 07/15/24 0542 07/15/24 0546 07/15/24 0542   (!) 180/75 (!) 42 18 97.8 °F (36.6 °C) 97 %      MAP       --                Physical Exam    Nursing note and vitals reviewed.  Constitutional: She appears well-developed and well-nourished.   HENT:   Head: Normocephalic and atraumatic.   Eyes: Conjunctivae, EOM and lids are normal. Pupils are equal, round, and reactive to light.   Neck: Trachea normal. Neck supple. No thyroid mass and no thyromegaly present.   Normal range of motion.  Cardiovascular:  Normal rate, regular rhythm and normal heart sounds.           Pulmonary/Chest: Effort normal.   Patient with minimal rales both bases.   Abdominal: Abdomen is soft. There is no abdominal tenderness.   Musculoskeletal:         General: Normal range of motion.      Cervical back: Normal range of motion and neck supple.     Neurological: She is alert and oriented to person, place, and time. She has normal strength and normal reflexes. No cranial nerve deficit or sensory deficit.   Skin: Skin is warm and dry.   Psychiatric: She has a normal mood and affect. Her speech is normal and behavior is normal. Judgment and thought content  normal.         ED Course   Procedures  Labs Reviewed   CBC W/ AUTO DIFFERENTIAL - Abnormal; Notable for the following components:       Result Value    Hemoglobin 11.4 (*)     MCH 25.6 (*)     MCHC 28.9 (*)     RDW 17.3 (*)     Eos # 0.6 (*)     Eosinophil % 8.9 (*)     All other components within normal limits   COMPREHENSIVE METABOLIC PANEL - Abnormal; Notable for the following components:    CO2 32 (*)     BUN 64 (*)     Creatinine 9.4 (*)     eGFR 3.8 (*)     All other components within normal limits   TROPONIN I HIGH SENSITIVITY - Abnormal; Notable for the following components:    Troponin I High Sensitivity 32.7 (*)     All other components within normal limits   B-TYPE NATRIURETIC PEPTIDE - Abnormal; Notable for the following components:    BNP 3,875 (*)     All other components within normal limits   APTT - Abnormal; Notable for the following components:    aPTT 34.2 (*)     All other components within normal limits   PROTIME-INR - Abnormal; Notable for the following components:    Prothrombin Time 13.5 (*)     All other components within normal limits   TROPONIN I HIGH SENSITIVITY - Abnormal; Notable for the following components:    Troponin I High Sensitivity 31.8 (*)     All other components within normal limits   ISTAT PROCEDURE - Abnormal; Notable for the following components:    POC PH 7.345 (*)     POC PCO2 58.3 (*)     POC PO2 25 (*)     POC HCO3 31.8 (*)     POC BE 6 (*)     POC TCO2 34 (*)     All other components within normal limits   MAGNESIUM   COMPREHENSIVE METABOLIC PANEL   MAGNESIUM   CBC W/ AUTO DIFFERENTIAL        ECG Results              EKG 12-lead (In process)        Collection Time Result Time QRS Duration OHS QTC Calculation    07/15/24 18:32:00 07/15/24 15:19:41 110 610                     In process by Interface, Lab In [facility] (07/15/24 15:19:47)                   Narrative:    Test Reason : R07.9,    Vent. Rate : 044 BPM     Atrial Rate : 044 BPM     P-R Int : 184 ms          QRS  Dur : 110 ms      QT Int : 714 ms       P-R-T Axes : 091 -06 -02 degrees     QTc Int : 610 ms    Marked sinus bradycardia  Nonspecific T wave abnormality  Prolonged QT  Abnormal ECG  When compared with ECG of 15-JUL-2024 06:17,  Sinus rhythm has replaced Ectopic atrial rhythm  T wave inversion less evident in Anterior leads    Referred By: [email or web address]   SELF           Confirmed By:                                      EKG 12-lead (In process)        Collection Time Result Time QRS Duration OHS QTC Calculation    07/15/24 06:17:36 07/15/24 12:09:47 110 594                     In process by Interface, Lab In [facility] (07/15/24 12:09:52)                   Narrative:    Test Reason : R07.9,    Vent. Rate : 042 BPM     Atrial Rate : 042 BPM     P-R Int : 192 ms          QRS Dur : 110 ms      QT Int : 712 ms       P-R-T Axes : -34 -25 006 degrees     QTc Int : 594 ms    Unusual P axis, possible ectopic atrial bradycardia  T wave abnormality, consider anterior ischemia  Prolonged QT  Abnormal ECG  When compared with ECG of 15-JUL-2024 05:48,  Previous ECG has undetermined rhythm, needs review  T wave inversion now evident in Anterior leads  QT has lengthened    Referred By: [email or web address]   SELF           Confirmed By:                                      EKG 12-lead (In process)        Collection Time Result Time QRS Duration OHS QTC Calculation    07/15/24 05:48:28 07/15/24 06:08:44 106 500                     In process by Interface, Lab In [facility] (07/15/24 06:08:46)                   Narrative:    Test Reason : R07.9,    Vent. Rate : 046 BPM     Atrial Rate : 083 BPM     P-R Int : 000 ms          QRS Dur : 106 ms      QT Int : 572 ms       P-R-T Axes : 000 -38 016 degrees     QTc Int : 500 ms    Undetermined rhythm  Left axis deviation  Incomplete left bundle branch block  Minimal voltage criteria for LVH, may be normal variant ( [place] product )  Nonspecific T wave abnormality  Prolonged QT  Abnormal ECG  When compared with  ECG of 29-JUN-2024 14:38,  Current undetermined rhythm precludes rhythm comparison, needs review  Incomplete left bundle branch block is now Present    Referred By: [email or web address]   SELF           Confirmed By:                                   Imaging Results              X-Ray Chest AP Portable (Final result)  Result time 07/15/24 06:54:18      Final result by Phillip Magaña MD (07/15/24 06:54:18)                   Impression:      Ill-defined bibasilar opacities may relate to atelectasis, aspiration, or pneumonia.      Electronically signed by: Phillip Magaña  Date:    07/15/2024  Time:    06:54               Narrative:    EXAMINATION:  XR CHEST AP PORTABLE    CLINICAL HISTORY:  Chest Pain;    TECHNIQUE:  Single frontal view of the chest was performed.    COMPARISON:  Chest radiograph performed 07/01/2024, 13:12 hours.    FINDINGS:  Monitoring leads overlie the chest.  Grossly unchanged cardiomediastinal contours.    Chronic interstitial coarsening is noted.    Ill-defined bibasilar opacities.    No definite pneumothorax or large volume pleural effusion.  No acute findings in the visualized abdomen.    Osseous and soft tissue structures appear without definite acute change.                                       Medications   sodium chloride 0.9% flush 3 mL (has no administration in time range)   melatonin tablet 6 mg (has no administration in time range)   ondansetron injection 4 mg (4 mg Intravenous Not Given 7/15/24 1500)   senna-docusate 8.6-50 mg per tablet 1 tablet (has no administration in time range)   acetaminophen tablet 650 mg (has no administration in time range)   aluminum-magnesium hydroxide-simethicone 200-200-20 mg/5 mL suspension 30 mL (30 mLs Oral Given 7/15/24 1154)   acetaminophen tablet 650 mg (has no administration in time range)   naloxone 0.4 mg/mL injection 0.02 mg (has no administration in time range)   potassium bicarbonate disintegrating tablet 50 mEq (has no administration in time  range)   potassium bicarbonate disintegrating tablet 35 mEq (has no administration in time range)   potassium bicarbonate disintegrating tablet 60 mEq (has no administration in time range)   magnesium oxide tablet 800 mg (has no administration in time range)   magnesium oxide tablet 800 mg (has no administration in time range)   potassium, sodium phosphates 280-160-250 mg packet 2 packet (has no administration in time range)   potassium, sodium phosphates 280-160-250 mg packet 2 packet (has no administration in time range)   potassium, sodium phosphates 280-160-250 mg packet 2 packet (has no administration in time range)   glucose chewable tablet 16 g (has no administration in time range)   glucose chewable tablet 24 g (has no administration in time range)   dextrose 50% injection 12.5 g (has no administration in time range)   dextrose 50% injection 25 g (has no administration in time range)   glucagon (human recombinant) injection 1 mg (has no administration in time range)     Medical Decision Making  Patient here for shortness of breath dialysis patient.  She is due for dialysis today.  Workup began by me care the patient turned over to Dr. Bose.    Amount and/or Complexity of Data Reviewed  Labs: ordered. Decision-making details documented in ED Course.  Radiology: ordered.               ED Course as of 07/16/24 0158   Mon Jul 15, 2024   0657 Sodium: 140 [AP]   0657 Potassium: 4.9 [AP]   0657 Chloride: 96 [AP]   0657 BUN(!): 64 [AP]   0657 Creatinine(!): 9.4 [AP]   0657 Calcium: 9.8 [AP]   0657 PROTEIN TOTAL: 7.7 [AP]   0657 BILIRUBIN TOTAL: 0.5 [AP]   0657 ALP: 81 [AP]   0657 AST: 20 [AP]   0657 ALT: 31 [AP]   0657 eGFR(!): 3.8 [AP]   0657 PT(!): 13.5 [AP]   0657 INR: 1.2 [AP]   0657 WBC: 6.28 [AP]   0657 Hemoglobin(!): 11.4 [AP]   0657 Hematocrit: 39.4 [AP]   0657 Platelet Count: 304 [AP]   0657 Magnesium : 2.3 [AP]   0657 BNP(!): 3,875 [AP]      ED Course User Index  [AP] Ramon Bose MD                            Clinical Impression:  Final diagnoses:  [R00.1] Bradycardia (Primary)  [R06.00] Dyspnea, unspecified type          ED Disposition Condition    Observation                 Saturnnio Fritz MD  07/16/24 0154

## 2024-07-16 LAB
ALBUMIN SERPL BCP-MCNC: 3.6 G/DL (ref 3.5–5.2)
ALP SERPL-CCNC: 75 U/L (ref 55–135)
ALT SERPL W/O P-5'-P-CCNC: 25 U/L (ref 10–44)
ANION GAP SERPL CALC-SCNC: 11 MMOL/L (ref 8–16)
AST SERPL-CCNC: 17 U/L (ref 10–40)
BASOPHILS # BLD AUTO: 0.04 K/UL (ref 0–0.2)
BASOPHILS NFR BLD: 0.6 % (ref 0–1.9)
BILIRUB SERPL-MCNC: 0.5 MG/DL (ref 0.1–1)
BUN SERPL-MCNC: 34 MG/DL (ref 8–23)
CALCIUM SERPL-MCNC: 9 MG/DL (ref 8.7–10.5)
CHLORIDE SERPL-SCNC: 104 MMOL/L (ref 95–110)
CO2 SERPL-SCNC: 23 MMOL/L (ref 23–29)
CREAT SERPL-MCNC: 6.8 MG/DL (ref 0.5–1.4)
DIFFERENTIAL METHOD BLD: ABNORMAL
EOSINOPHIL # BLD AUTO: 0.6 K/UL (ref 0–0.5)
EOSINOPHIL NFR BLD: 9.1 % (ref 0–8)
ERYTHROCYTE [DISTWIDTH] IN BLOOD BY AUTOMATED COUNT: 17.2 % (ref 11.5–14.5)
EST. GFR  (NO RACE VARIABLE): 5.6 ML/MIN/1.73 M^2
GLUCOSE SERPL-MCNC: 75 MG/DL (ref 70–110)
HCT VFR BLD AUTO: 36.3 % (ref 37–48.5)
HGB BLD-MCNC: 10.6 G/DL (ref 12–16)
IMM GRANULOCYTES # BLD AUTO: 0.01 K/UL (ref 0–0.04)
IMM GRANULOCYTES NFR BLD AUTO: 0.2 % (ref 0–0.5)
LYMPHOCYTES # BLD AUTO: 1.1 K/UL (ref 1–4.8)
LYMPHOCYTES NFR BLD: 16.8 % (ref 18–48)
MAGNESIUM SERPL-MCNC: 2.2 MG/DL (ref 1.6–2.6)
MCH RBC QN AUTO: 26 PG (ref 27–31)
MCHC RBC AUTO-ENTMCNC: 29.2 G/DL (ref 32–36)
MCV RBC AUTO: 89 FL (ref 82–98)
MONOCYTES # BLD AUTO: 1 K/UL (ref 0.3–1)
MONOCYTES NFR BLD: 15.9 % (ref 4–15)
NEUTROPHILS # BLD AUTO: 3.6 K/UL (ref 1.8–7.7)
NEUTROPHILS NFR BLD: 57.4 % (ref 38–73)
NRBC BLD-RTO: 0 /100 WBC
PLATELET # BLD AUTO: 262 K/UL (ref 150–450)
PMV BLD AUTO: 10.1 FL (ref 9.2–12.9)
POTASSIUM SERPL-SCNC: 5 MMOL/L (ref 3.5–5.1)
PROT SERPL-MCNC: 6.9 G/DL (ref 6–8.4)
RBC # BLD AUTO: 4.07 M/UL (ref 4–5.4)
SODIUM SERPL-SCNC: 138 MMOL/L (ref 136–145)
WBC # BLD AUTO: 6.24 K/UL (ref 3.9–12.7)

## 2024-07-16 PROCEDURE — 99223 1ST HOSP IP/OBS HIGH 75: CPT | Mod: ,,, | Performed by: GENERAL PRACTICE

## 2024-07-16 PROCEDURE — 85025 COMPLETE CBC W/AUTO DIFF WBC: CPT | Performed by: NURSE PRACTITIONER

## 2024-07-16 PROCEDURE — 93010 ELECTROCARDIOGRAM REPORT: CPT | Mod: ,,, | Performed by: GENERAL PRACTICE

## 2024-07-16 PROCEDURE — 63600175 PHARM REV CODE 636 W HCPCS: Performed by: NURSE PRACTITIONER

## 2024-07-16 PROCEDURE — 97161 PT EVAL LOW COMPLEX 20 MIN: CPT

## 2024-07-16 PROCEDURE — 36415 COLL VENOUS BLD VENIPUNCTURE: CPT | Performed by: NURSE PRACTITIONER

## 2024-07-16 PROCEDURE — 93005 ELECTROCARDIOGRAM TRACING: CPT | Performed by: GENERAL PRACTICE

## 2024-07-16 PROCEDURE — 25000003 PHARM REV CODE 250: Performed by: NURSE PRACTITIONER

## 2024-07-16 PROCEDURE — 83735 ASSAY OF MAGNESIUM: CPT | Performed by: NURSE PRACTITIONER

## 2024-07-16 PROCEDURE — 80053 COMPREHEN METABOLIC PANEL: CPT | Performed by: NURSE PRACTITIONER

## 2024-07-16 PROCEDURE — 21400001 HC TELEMETRY ROOM

## 2024-07-16 PROCEDURE — 97165 OT EVAL LOW COMPLEX 30 MIN: CPT

## 2024-07-16 RX ORDER — GUAIFENESIN 100 MG/5ML
200 SOLUTION ORAL EVERY 6 HOURS PRN
Status: DISCONTINUED | OUTPATIENT
Start: 2024-07-16 | End: 2024-07-20 | Stop reason: HOSPADM

## 2024-07-16 RX ORDER — HYDRALAZINE HYDROCHLORIDE 20 MG/ML
10 INJECTION INTRAMUSCULAR; INTRAVENOUS EVERY 6 HOURS PRN
Status: DISCONTINUED | OUTPATIENT
Start: 2024-07-16 | End: 2024-07-20 | Stop reason: HOSPADM

## 2024-07-16 RX ORDER — CALCIUM ACETATE 667 MG/1
667 CAPSULE ORAL
Status: DISCONTINUED | OUTPATIENT
Start: 2024-07-16 | End: 2024-07-20 | Stop reason: HOSPADM

## 2024-07-16 RX ADMIN — HYDRALAZINE HYDROCHLORIDE 10 MG: 20 INJECTION INTRAMUSCULAR; INTRAVENOUS at 11:07

## 2024-07-16 RX ADMIN — APIXABAN 2.5 MG: 2.5 TABLET, FILM COATED ORAL at 09:07

## 2024-07-16 RX ADMIN — HYDRALAZINE HYDROCHLORIDE 10 MG: 20 INJECTION INTRAMUSCULAR; INTRAVENOUS at 10:07

## 2024-07-16 RX ADMIN — APIXABAN 2.5 MG: 2.5 TABLET, FILM COATED ORAL at 08:07

## 2024-07-16 RX ADMIN — ONDANSETRON 4 MG: 2 INJECTION INTRAMUSCULAR; INTRAVENOUS at 09:07

## 2024-07-16 RX ADMIN — CALCIUM ACETATE 667 MG: 667 CAPSULE ORAL at 01:07

## 2024-07-16 RX ADMIN — CALCIUM ACETATE 667 MG: 667 CAPSULE ORAL at 09:07

## 2024-07-16 NOTE — PT/OT/SLP EVAL
Physical Therapy Evaluation    Patient Name:  Tyra Isaac   MRN:  [id number]    Recommendations:     Discharge Recommendations: Moderate Intensity Therapy (however, patient refusing placement and requests HHPT)   Discharge Equipment Recommendations: none   Barriers to discharge:  weakness    Assessment:     Tyra Isaac is a 84 y.o. female admitted with a medical diagnosis of <principal problem not specified>.  She presents with the following impairments/functional limitations: weakness, impaired endurance, impaired self care skills, impaired functional mobility, impaired balance, decreased lower extremity function, decreased safety awareness, impaired cardiopulmonary response to activity. Patient declining PT eval due to nausea, but RN present states patient recently received medication to help with nausea and encouraged patient to participate with therapy. Her son and JOSE live with her and she uses a rollator for ambulation at baseline. She requires ModA for supine to sit with patient tremulous sitting EOB. She declines to attempt standing. She returned to bed with all needs met.     Rehab Prognosis: Fair; patient would benefit from acute skilled PT services to address these deficits and reach maximum level of function.    Recent Surgery: * No surgery found *      Plan:     During this hospitalization, patient to be seen 5 x/week to address the identified rehab impairments via gait training, therapeutic activities, therapeutic exercises and progress toward the following goals:    Plan of Care Expires:  08/16/24    Subjective     Chief Complaint: nausea   Patient/Family Comments/goals: home upon D/C  Pain/Comfort:  Pain Rating 1:  (no pain verbalized)    Patients cultural, spiritual, Denominational conflicts given the current situation:      Living Environment:  Patient's son and DIL live with her is a [facility] with 2-3 KEY   Prior to admission, patients level of function was Kady with rollator. She reports 2  falls in the past year. She was receiving HHPT. She reports her L wrist is broken in 5 places?  Equipment used at home: rollator, wheelchair, bedside commode.  DME owned (not currently used): none.  Upon discharge, patient will have assistance from family.    Objective:     Communicated with AMY Overton prior to session.  Patient found HOB elevated with blood pressure cuff, pulse ox (continuous), telemetry  upon PT entry to room.    General Precautions: Standard, fall  Orthopedic Precautions:N/A   Braces: N/A  Respiratory Status: Room air    Exams:  RLE Strength: WFL  LLE Strength: WFL    Functional Mobility:  Bed Mobility:     Supine to Sit: moderate assistance      AM-PAC 6 CLICK MOBILITY  Total Score:8       Treatment & Education:  Patient was educated on the importance of OOB activity and functional mobility to negate negative effects of prolonged bed rest during hospitalization, safe transfers and ambulation, and D/C planning     Patient left HOB elevated with all lines intact.    GOALS:   Multidisciplinary Problems       Physical Therapy Goals          Problem: Physical Therapy    Goal Priority Disciplines Outcome Goal Variances Interventions   Physical Therapy Goal     PT, PT/OT      Description: Goals to be met by: 24    Patient will increase functional independence with mobility by performin. Supine to sit with Supervision  2. Sit to stand transfer with Supervision  3. Bed to chair transfer with Supervision using Rolling Walker  4. Gait  x 100 feet with Supervision using Rolling Walker.   5. 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    Osteopenia of multiple sites 3/9/2018    Based upon bone density measurements. Patient also has chronic kidney disease.    Stroke 11/2016       Past Surgical History:   Procedure Laterality Date    ANGIOGRAM, CORONARY, WITH LEFT HEART CATHETERIZATION N/A 2/7/2022    Procedure: Angiogram, Coronary, with Left Heart Cath;  Surgeon: Gino Leal MD;  Location: [facility] CATH/EP LAB;  Service: Cardiology;  Laterality: N/A;    CARDIAC SURGERY      stents    EYE SURGERY      WRIST SURGERY         Time Tracking:     PT Received On: 07/16/24  PT Start Time: 1041     PT Stop Time: 1054  PT Total Time (min): 13 min     Billable Minutes: Evaluation 13      07/16/2024

## 2024-07-16 NOTE — PLAN OF CARE
Inpatient Upgrade Note    Tyra Isaac has warranted treatment spanning two or more midnights of hospital level care for the management of chest pain and Bradycardia . She continues to require daily labs, further testing/imaging, monitoring of vital signs, and medication adjustments. Her condition is also complicated by the following comorbidities: Coronary Artery Disease, Chronic kidney disease, and ESRD HD  .

## 2024-07-16 NOTE — SUBJECTIVE & OBJECTIVE
Interval History: Patient seen in ER.  NAD.  Reports continued generalized weakness.  Denies chest pain.  Mild shortness of breath and cough reported.  Tolerated HD yesterday with 2L removed.   Remains bradycardic.  Continue to monitor and hold amio/betablocker.  Cardiology following.   PT/OT consulted     Review of Systems   Constitutional:  Positive for fatigue.   Respiratory:  Positive for shortness of breath.    Cardiovascular:  Negative for chest pain and palpitations.   Gastrointestinal:  Negative for abdominal pain and nausea.     Objective:     Vital Signs (Most Recent):  Temp: 98.5 °F (36.9 °C) (07/16/24 1215)  Pulse: (!) 58 (07/16/24 1356)  Resp: 16 (07/16/24 1215)  BP: (!) 124/50 (07/16/24 1215)  SpO2: 97 % (07/16/24 1356) Vital Signs (24h Range):  Temp:  [97.8 °F (36.6 °C)-98.5 °F (36.9 °C)] 98.5 °F (36.9 °C)  Pulse:  [40-61] 58  Resp:  [16-24] 16  SpO2:  [95 %-99 %] 97 %  BP: (106-190)/(42-66) 124/50     Weight: 46.3 kg (102 lb)  Body mass index is 16.97 kg/m².    Intake/Output Summary (Last 24 hours) at 7/16/2024 1420  Last data filed at 7/16/2024 0957  Gross per 24 hour   Intake 680 ml   Output 2500 ml   Net -1820 ml         Physical Exam  Vitals and nursing note reviewed.   Constitutional:       General: She is not in acute distress.  Cardiovascular:      Rate and Rhythm: Regular rhythm. Bradycardia present.   Pulmonary:      Effort: Pulmonary effort is normal. No respiratory distress.      Breath sounds: Normal breath sounds.   Abdominal:      Palpations: Abdomen is soft.      Tenderness: There is no abdominal tenderness.   Musculoskeletal:      Right lower leg: No edema.      Left lower leg: No edema.   Skin:     General: Skin is warm and dry.   Neurological:      Mental Status: She is alert and oriented to person, place, and time.             Significant Labs: All pertinent labs within the past 24 hours have been reviewed.  CBC:   Recent Labs   Lab 07/15/24  0559 07/15/24  0621 07/16/24  0420    WBC 6.28  --  6.24   HGB 11.4*  --  10.6*   HCT 39.4 36 36.3*     --  262     CMP:   Recent Labs   Lab 07/15/24  0559 07/16/24  0420    138   K 4.9 5.0   CL 96 104   CO2 32* 23    75   BUN 64* 34*   CREATININE 9.4* 6.8*   CALCIUM 9.8 9.0   PROT 7.7 6.9   ALBUMIN 4.2 3.6   BILITOT 0.5 0.5   ALKPHOS 81 75   AST 20 17   ALT 31 25   ANIONGAP 12 11     Magnesium:   Recent Labs   Lab 07/15/24  0559 07/16/24  0420   MG 2.3 2.2       Significant Imaging: I have reviewed all pertinent imaging results/findings within the past 24 hours.

## 2024-07-16 NOTE — ASSESSMENT & PLAN NOTE
Patient with atrial fibrillation which is controlled currently with Beta Blocker and Amiodarone. Patient is currently in sinus bradycardia.[id number] Score: 4. Anticoagulation indicated. Anticoagulation done with renally dosed Eliquis .  Holding home amiodarone/beta blocker at this time due to bradycardia   Cardiology consulted

## 2024-07-16 NOTE — PROGRESS NOTES
INPATIENT NEPHROLOGY progress  [facility]    Patient Name: Tyra Isaac  Date: 07/16/2024    Reason for consultation: ESRD    Chief Complaint:   Chief Complaint   Patient presents with    Chest Pain    Shortness of Breath       History of Present Illness:  83 y/o F with hx of ESRD on HD MWF and pAF who p/w dyspnea, no chest pain, no exac/reliev factors, due for HD today, consulted for dialysis.    Interval History:  7/15- HR 40s, -200, on RA, BNP 3800 (900 2 weeks ago), trop leak- seen on HD, attempting 2L UF; CXR  bibasilar opacities may relate to atelectasis, aspiration, or pneumonia.   7/16  still housed and seen in ER.  No complaints, just wants to be moved to a room.  2L off w/HD yesterday, no acute dialysis needs today.  VSS.    Plan of Care:    Assessment:  ESRD on HD MWF  Hx of pAF/AF with RVR; Bradycardia   HTN emergency  SHPT  Anemia of CKD    Plan:    - ordered HD MWF  - cards eval pending- supposed to be on metop, amio, eliquis  - UF 2-3L- renal diet, 1.5L fluid restriction  - no acute binder needs  - no acute BRAYAN needs    Thank you for allowing us to participate in this patient's care. We will continue to follow.    Vital Signs:  Temp Readings from Last 3 Encounters:   07/16/24 98.5 °F (36.9 °C) (Oral)   07/06/24 98.6 °F (37 °C) (Oral)   06/03/24 98.5 °F (36.9 °C) (Oral)       Pulse Readings from Last 3 Encounters:   07/16/24 (!) 57   07/11/24 (!) 50   07/06/24 (!) 58       BP Readings from Last 3 Encounters:   07/16/24 (!) 119/43   07/11/24 (!) 138/54   07/06/24 (!) 190/77       Weight:  Wt Readings from Last 3 Encounters:   07/15/24 46.3 kg (102 lb)   07/11/24 46.3 kg (102 lb)   06/28/24 48.7 kg (107 lb 5.8 oz)       Past Medical & Surgical History:  Past Medical History:   Diagnosis Date    A-fib     Anxiety     Depression     Disorder of kidney and ureter     Encephalopathy acute 1/1/2018    End stage kidney disease 6/17/2017    Gout     Hyperlipidemia      Hypertension     Moderate episode of recurrent major depressive disorder 1/17/2018    Nephropathy hypertensive, 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    Osteopenia of multiple sites 3/9/2018    Based upon bone density measurements. Patient also has chronic kidney disease.    Stroke 11/2016       Past Surgical History:   Procedure Laterality Date    ANGIOGRAM, CORONARY, WITH LEFT HEART CATHETERIZATION N/A 2/7/2022    Procedure: Angiogram, Coronary, with Left Heart Cath;  Surgeon: Gino Leal MD;  Location: [facility] CATH/EP LAB;  Service: Cardiology;  Laterality: N/A;    CARDIAC SURGERY      stents    EYE SURGERY      WRIST SURGERY         Past Social History:  Social History     Socioeconomic History    Marital status:      Spouse name: Aria Isaac    Number of children: 3   Occupational History    Occupation: Not working   Tobacco Use    Smoking status: Never    Smokeless tobacco: Never   Substance and Sexual Activity    Alcohol use: No    Drug use: No    Sexual activity: Not Currently     Social Determinants of Health     Financial Resource Strain: Medium Risk (7/11/2024)    Overall Financial Resource Strain (CARDIA)     Difficulty of Paying Living Expenses: Somewhat hard   Food Insecurity: No Food Insecurity (7/11/2024)    Hunger Vital Sign     Worried About Running Out of Food in the Last Year: Never true     Ran Out of Food in the Last Year: Never true   Transportation Needs: No Transportation Needs (7/11/2024)    TRANSPORTATION NEEDS     Transportation : No   Physical Activity: Inactive (6/15/2022)    Exercise Vital Sign     Days of Exercise per Week: 0 days     Minutes of Exercise per Session: 0 min   Stress: Stress Concern Present (7/11/2024)    [employer] - Occupational Stress Questionnaire     Feeling of Stress : To some extent    Housing Stability: Patient Declined (6/30/2024)    Housing Stability Vital Sign     Unable to Pay for Housing in the Last Year: Patient declined     Homeless in the Last Year: Patient declined       Medications:  Scheduled Meds:   apixaban  2.5 mg Oral BID    calcium acetate(phosphat bind)  667 mg Oral TID WM     Continuous Infusions:  PRN Meds:.  Current Facility-Administered Medications:     acetaminophen, 650 mg, Oral, Q8H PRN    acetaminophen, 650 mg, Oral, Q4H PRN    aluminum-magnesium hydroxide-simethicone, 30 mL, Oral, QID PRN    dextrose 50%, 12.5 g, Intravenous, PRN    dextrose 50%, 25 g, Intravenous, PRN    glucagon (human recombinant), 1 mg, Intramuscular, PRN    glucose, 16 g, Oral, PRN    glucose, 24 g, Oral, PRN    hydrALAZINE, 10 mg, Intravenous, Q6H PRN    magnesium oxide, 800 mg, Oral, PRN    magnesium oxide, 800 mg, Oral, PRN    melatonin, 6 mg, Oral, Nightly PRN    naloxone, 0.02 mg, Intravenous, PRN    ondansetron, 4 mg, Intravenous, Q6H PRN    potassium bicarbonate, 35 mEq, Oral, PRN    potassium bicarbonate, 50 mEq, Oral, PRN    potassium bicarbonate, 60 mEq, Oral, PRN    potassium, sodium phosphates, 2 packet, Oral, PRN    potassium, sodium phosphates, 2 packet, Oral, PRN    potassium, sodium phosphates, 2 packet, Oral, PRN    senna-docusate 8.6-50 mg, 1 tablet, Oral, Daily PRN    sodium chloride 0.9%, 3 mL, Intravenous, Q12H PRN  No current facility-administered medications on file prior to encounter.     Current Outpatient Medications on File Prior to Encounter   Medication Sig Dispense Refill    amiodarone (PACERONE) 200 MG Tab Take 1 tablet (200 mg total) by mouth 3 (three) times daily for 14 days, THEN 1 tablet (200 mg total) 2 (two) times daily for 7 days. 56 tablet 0    atorvastatin (LIPITOR) 40 MG tablet Take 40 mg by mouth once daily.      b complex vitamins tablet Take 1 tablet by mouth once daily.      dorzolamide-timolol 2-0.5% (COSOPT) 22.3-6.8 mg/mL ophthalmic solution Place 1  drop into both eyes 2 (two) times daily.      ELIQUIS 2.5 mg Tab Take 1 tablet (2.5 mg total) by mouth 2 (two) times daily. 60 tablet 0    latanoprost 0.005 % ophthalmic solution Place 1 drop into both eyes every evening.      metoclopramide HCl (REGLAN) 5 MG tablet Take 1 tablet (5 mg total) by mouth 3 (three) times daily before meals. 30 tablet 1    metoprolol tartrate (LOPRESSOR) 25 MG tablet Take 0.5 tablets (12.5 mg total) by mouth 2 (two) times daily. 30 tablet 0    midodrine (PROAMATINE) 10 MG tablet Take 1 tablet (10 mg total) by mouth 3 (three) times daily with meals. 270 tablet 0    ondansetron (ZOFRAN-ODT) 4 MG TbDL Take 1 tablet (4 mg total) by mouth every 6 (six) hours as needed (nausea). 30 tablet 5    loperamide (IMODIUM A-D) 2 mg Tab Take 1 tablet (2 mg total) by mouth 4 (four) times daily as needed (diarrhea). (Patient not taking: Reported on 7/15/2024) 3 tablet 0       Allergies:  Cyclobenzaprine, Fish containing products, Peanut, and Tramadol    Past Family History:  Reviewed; refer to Hospitalist Admission Note    Review of Systems:  Review of Systems - All 14 systems reviewed and negative, except as noted in HPI    Physical Exam:  General Appearance:    Lethargic, AAO x 3, cooperative, appears stated age   Head:    Normocephalic, atraumatic   Eyes:    PER, EOMI, and conjunctiva/sclera clear bilaterally       Mouth:   Moist mucus membranes, no thrush or oral lesions,       normal dentition   Back:     No CVA tenderness   Lungs:     Coarse   Chest wall:    No tenderness or deformity   Heart:    Regular rate and rhythm, S1 and S2 normal, no murmur, rub   or gallop   Abdomen:     Soft, non-tender, non-distended, bowel sounds active all four   quadrants, no RT or guarding, no masses, no organomegaly   Extremities:   Warm and well perfused, distal pulses are intact, no             cyanosis or peripheral edema   MSK:   No joint or muscle swelling, tenderness or deformity   Skin:   Skin color, texture,  turgor normal, no rashes or lesions   Neurologic/Psychiatric:   CNII-XII intact     Results:  Lab Results   Component Value Date     07/16/2024    K 5.0 07/16/2024     07/16/2024    CO2 23 07/16/2024    BUN 34 (H) 07/16/2024    CREATININE 6.8 (H) 07/16/2024    CALCIUM 9.0 07/16/2024    ANIONGAP 11 07/16/2024    ESTGFRAFRICA 4.7 (A) 02/08/2022    EGFRNONAA 4.0 (A) 02/08/2022       Lab Results   Component Value Date    CALCIUM 9.0 07/16/2024    PHOS 4.6 (H) 06/30/2024       Recent Labs   Lab 07/16/24  0420   WBC 6.24   RBC 4.07   HGB 10.6*   HCT 36.3*      MCV 89   MCH 26.0*   MCHC 29.2*       I have personally reviewed pertinent radiological imaging and reports from this admission.    I have spent > 70 minutes providing care for this patient for the above diagnoses. These services have included chart/data/imaging review, evaluation, exam, formulation of plan, , note preparation, and discussions with staff involved in this patient's care.    Arpita Salas NP    [facility]  [address] [place] [address]  JORGE Alanis [id number]  [phone] (p)  [phone] (f)

## 2024-07-16 NOTE — PROGRESS NOTES
"[facility]  Department of Cardiology  Progress Note      PATIENT NAME: Tyra Isaac  MRN: [id number]  TODAY'S DATE: 07/16/2024  ADMIT DATE: 7/15/2024                          CONSULT REQUESTED BY: Lizz Yap MD    SUBJECTIVE     PRINCIPAL PROBLEM: <principal problem not specified>      REASON FOR CONSULT:  Bradycardia    Interval history:  7/16/24  Resting hallway bed, fatigued, heart rate 58 beats per minute.  BP stable    HPI:  Patient is an 84-year-old  female who presented to the ER with complaints of chest pain and shortness of breath.  She states I could not breathe and had tightness in my chest."She has also been coughing and recently medicated with cough syrup.  She was anxious to get to dialysis today as she has not had it since Friday.  She has end-stage renal disease on hemodialysis Monday Wednesday Friday and sees Dr. Valero.  Patient's daughter is present and states a new medication that she was started on at last hospitalization did not agree well with her.  Hospital discharge as below notes discharging her on amiodarone taper but there are also notes that she was on Multaq at home as well.  She was also on Lopressor 12.5 mg b.i.d. at home.  Patient's heart rate on arrival to the emergency room was in the 30s-40s with prolonged QT.    Patient recently discharged from hospital  Hospital Course from 06/27/2024-07/06/2024:   "Patient was admitted with chest pain and hyperkalemia.  Her EKG was nonischemic and her troponins were noted to be elevated, which is baseline for her 2/2 ESRD. She remained chest pain free during her stay.  Nephrology was consulted for routine dialysis.  She was maintained on telemetry and cardiology was consulted.  She received medications to shift her potassium and dialysis was ordered.  Her dialysis was cut short due to syncopal episode with hypotension.  She was monitored overnight.  She went for stress testing, which was negative for " "reversible ischemia.  She had another episode of severe tachycardia after dialysis and needed Lopressor and Cardizem IV and moved to ICU.  On 7/1 during dialysis patient became tachycardic and hypotensive in afib RVR. She was cardioverted and started on amio drip and phenylephrine drip.  Palliative Care was consulted, patient wishes to remain full code at this time until she discusses it further with her family.  Patient's heart rate and BP remained stable.  The amiodarone drip was transitioned to PO and patient was transferred out of ICU on 7/4/24.  Patient had hemodialysis 7/5/24 and tolerated this well.  PT/OT was consulted who recommended home health which was ordered.  Patient seen and examined on day of discharge and deemed stable for discharge home with home health.  She will need to follow up with her PCP, Cardiologist, and Nephrologist in 1 week.  Patient agreeable with plan and eager for discharge home. "        Review of patient's allergies indicates:   Allergen Reactions    Cyclobenzaprine     Fish containing products Hives    Peanut Other (See Comments)    Tramadol Itching       Past Medical History:   Diagnosis Date    A-fib     Anxiety     Depression     Disorder of kidney and ureter     Encephalopathy acute 1/1/2018    End stage kidney disease 6/17/2017    Gout     Hyperlipidemia     Hypertension     Moderate episode of recurrent major depressive disorder 1/17/2018    Nephropathy hypertensive, 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    Osteopenia of multiple sites 3/9/2018    Based upon bone density measurements. Patient also has chronic kidney disease.    Stroke 11/2016     Past Surgical History:   Procedure Laterality Date    ANGIOGRAM, CORONARY, WITH LEFT HEART CATHETERIZATION N/A 2/7/2022    Procedure: Angiogram, Coronary, with Left Heart Cath;  Surgeon: Gino Feliz " MD Mel;  Location: [facility] CATH/EP LAB;  Service: Cardiology;  Laterality: N/A;    CARDIAC SURGERY      stents    EYE SURGERY      WRIST SURGERY       Social History     Tobacco Use    Smoking status: Never    Smokeless tobacco: Never   Substance Use Topics    Alcohol use: No    Drug use: No        REVIEW OF SYSTEMS  Negative except as mentioned in HPI    OBJECTIVE     VITAL SIGNS (Most Recent)  Temp: 98.5 °F (36.9 °C) (07/16/24 0730)  Pulse: (!) 52 (07/16/24 0730)  Resp: 18 (07/16/24 0730)  BP: (!) 172/59 (07/16/24 0730)  SpO2: 98 % (07/16/24 0730)    VENTILATION STATUS  Resp: 18 (07/16/24 0730)  SpO2: 98 % (07/16/24 0730)           I & O (Last 24H):  Intake/Output Summary (Last 24 hours) at 7/16/2024 1006  Last data filed at 7/16/2024 0957  Gross per 24 hour   Intake 680 ml   Output 2500 ml   Net -1820 ml       WEIGHTS  Wt Readings from Last 3 Encounters:   07/15/24 0542 46.3 kg (102 lb)   07/11/24 1030 46.3 kg (102 lb)   06/28/24 0300 48.7 kg (107 lb 5.8 oz)   06/27/24 2343 47.6 kg (105 lb)       PHYSICAL EXAM    GENERAL:  Elderly fatigued thin female resting in bed in no apparent distress.  HEENT: Normocephalic.    NECK: No JVD.   CARDIAC: Regular bradycardic rate and rhythm.  + murmur  CHEST ANATOMY: normal.   LUNGS:  Diminished at bases   ABDOMEN: Soft .  Normal bowel sounds.    EXTREMITIES:  Right arm fistula for dialysis in place.    CENTRAL NERVOUS SYSTEM: AAO x 3  SKIN: No rash     HOME MEDICATIONS:  No current facility-administered medications on file prior to encounter.     Current Outpatient Medications on File Prior to Encounter   Medication Sig Dispense Refill    amiodarone (PACERONE) 200 MG Tab Take 1 tablet (200 mg total) by mouth 3 (three) times daily for 14 days, THEN 1 tablet (200 mg total) 2 (two) times daily for 7 days. 56 tablet 0    atorvastatin (LIPITOR) 40 MG tablet Take 40 mg by mouth once daily.      b complex vitamins tablet Take 1 tablet by mouth once daily.      dorzolamide-timolol  2-0.5% (COSOPT) 22.3-6.8 mg/mL ophthalmic solution Place 1 drop into both eyes 2 (two) times daily.      ELIQUIS 2.5 mg Tab Take 1 tablet (2.5 mg total) by mouth 2 (two) times daily. 60 tablet 0    latanoprost 0.005 % ophthalmic solution Place 1 drop into both eyes every evening.      metoclopramide HCl (REGLAN) 5 MG tablet Take 1 tablet (5 mg total) by mouth 3 (three) times daily before meals. 30 tablet 1    metoprolol tartrate (LOPRESSOR) 25 MG tablet Take 0.5 tablets (12.5 mg total) by mouth 2 (two) times daily. 30 tablet 0    midodrine (PROAMATINE) 10 MG tablet Take 1 tablet (10 mg total) by mouth 3 (three) times daily with meals. 270 tablet 0    ondansetron (ZOFRAN-ODT) 4 MG TbDL Take 1 tablet (4 mg total) by mouth every 6 (six) hours as needed (nausea). 30 tablet 5    loperamide (IMODIUM A-D) 2 mg Tab Take 1 tablet (2 mg total) by mouth 4 (four) times daily as needed (diarrhea). (Patient not taking: Reported on 7/15/2024) 3 tablet 0       SCHEDULED MEDS:   apixaban  2.5 mg Oral BID    calcium acetate(phosphat bind)  667 mg Oral TID WM       CONTINUOUS INFUSIONS:    PRN MEDS:  Current Facility-Administered Medications:     acetaminophen, 650 mg, Oral, Q8H PRN    acetaminophen, 650 mg, Oral, Q4H PRN    aluminum-magnesium hydroxide-simethicone, 30 mL, Oral, QID PRN    dextrose 50%, 12.5 g, Intravenous, PRN    dextrose 50%, 25 g, Intravenous, PRN    glucagon (human recombinant), 1 mg, Intramuscular, PRN    glucose, 16 g, Oral, PRN    glucose, 24 g, Oral, PRN    hydrALAZINE, 10 mg, Intravenous, Q6H PRN    magnesium oxide, 800 mg, Oral, PRN    magnesium oxide, 800 mg, Oral, PRN    melatonin, 6 mg, Oral, Nightly PRN    naloxone, 0.02 mg, Intravenous, PRN    ondansetron, 4 mg, Intravenous, Q6H PRN    potassium bicarbonate, 35 mEq, Oral, PRN    potassium bicarbonate, 50 mEq, Oral, PRN    potassium bicarbonate, 60 mEq, Oral, PRN    potassium, sodium phosphates, 2 packet, Oral, PRN    potassium, sodium phosphates, 2  "packet, Oral, PRN    potassium, sodium phosphates, 2 packet, Oral, PRN    senna-docusate 8.6-50 mg, 1 tablet, Oral, Daily PRN    sodium chloride 0.9%, 3 mL, Intravenous, Q12H PRN    LABS AND DIAGNOSTICS     CBC LAST 3 DAYS  Recent Labs   Lab 07/15/24  0559 07/15/24  0621 07/16/24  0420   WBC 6.28  --  6.24   RBC 4.45  --  4.07   HGB 11.4*  --  10.6*   HCT 39.4 36 36.3*   MCV 89  --  89   MCH 25.6*  --  26.0*   MCHC 28.9*  --  29.2*   RDW 17.3*  --  17.2*     --  262   MPV 9.9  --  10.1   GRAN 59.9  3.8  --  57.4  3.6   LYMPH 18.6  1.2  --  16.8*  1.1   MONO 11.8  0.7  --  15.9*  1.0   BASO 0.03  --  0.04   NRBC 0  --  0       COAGULATION LAST 3 DAYS  Recent Labs   Lab 07/15/24  0559   INR 1.2   APTT 34.2*       CHEMISTRY LAST 3 DAYS  Recent Labs   Lab 07/15/24  0559 07/15/24  0621 07/16/24  0420     --  138   K 4.9  --  5.0   CL 96  --  104   CO2 32*  --  23   ANIONGAP 12  --  11   BUN 64*  --  34*   CREATININE 9.4*  --  6.8*     --  75   CALCIUM 9.8  --  9.0   PH  --  7.345*  --    MG 2.3  --  2.2   ALBUMIN 4.2  --  3.6   PROT 7.7  --  6.9   ALKPHOS 81  --  75   ALT 31  --  25   AST 20  --  17   BILITOT 0.5  --  0.5       CARDIAC PROFILE LAST 3 DAYS  Recent Labs   Lab 07/15/24  0559   BNP 3,875*       ENDOCRINE LAST 3 DAYS  No results for input(s): "TSH", "PROCAL" in the last 168 hours.    LAST ARTERIAL BLOOD GAS  ABG  Recent Labs   Lab 07/15/24  0621   PH 7.345*   PO2 25*   PCO2 58.3*   HCO3 31.8*   BE 6*       LAST 7 DAYS MICROBIOLOGY   Microbiology Results (last 7 days)       ** No results found for the last 168 hours. **            MOST RECENT IMAGING  X-Ray Chest AP Portable  Narrative: EXAMINATION:  XR CHEST AP PORTABLE    CLINICAL HISTORY:  Chest Pain;    TECHNIQUE:  Single frontal view of the chest was performed.    COMPARISON:  Chest radiograph performed 07/01/2024, 13:12 hours.    FINDINGS:  Monitoring leads overlie the chest.  Grossly unchanged cardiomediastinal " contours.    Chronic interstitial coarsening is noted.    Ill-defined bibasilar opacities.    No definite pneumothorax or large volume pleural effusion.  No acute findings in the visualized abdomen.    Osseous and soft tissue structures appear without definite acute change.  Impression: Ill-defined bibasilar opacities may relate to atelectasis, aspiration, or pneumonia.    Electronically signed by: Phillip Magaña  Date:    07/15/2024  Time:    06:54      ECHOCARDIOGRAM RESULTS (last 5)  Results for orders placed during the hospital encounter of 06/27/24    Echo    Interpretation Summary    Left Ventricle: The left ventricle is normal in size. Normal wall thickness. There is concentric remodeling. There is normal systolic function with a visually estimated ejection fraction of 60 - 65%. Biplane (2D) method of discs ejection fraction is 64%.    Right Ventricle: Normal right ventricular cavity size. Wall thickness is normal. Systolic function is normal.    Left Atrium: Left atrium is mildly dilated.    Aortic Valve: The aortic valve is a trileaflet valve. Mildly calcified left, right and noncoronary cusps. Mildly restricted motion. There is mild stenosis. Aortic valve area by VTI is 1.80 cm². Aortic valve peak velocity is 1.22 m/s. Mean gradient is 3 mmHg. The dimensionless index is 0.79.    Mitral Valve: There is moderate mitral annular calcification present. There is severe stenosis. The mean pressure gradient across the mitral valve is 9 mmHg at a heart rate of  bpm. There is mild regurgitation with a centrally directed jet.      Results for orders placed during the hospital encounter of 01/04/24    Echo    Interpretation Summary    Left Ventricle: The left ventricle is normal in size. Normal wall thickness. Normal wall motion. There is normal systolic function with a visually estimated ejection fraction of 60 - 65%. There is diastolic dysfunction.    Right Ventricle: Normal right ventricular cavity size. Wall  thickness is normal. Right ventricle wall motion  is normal. Systolic function is normal.    Left Atrium: Left atrium is severely dilated.    Mitral Valve: There is moderate bileaflet sclerosis. There is moderate mitral annular calcification present. There is mild to moderate regurgitation with a centrally directed jet.    Tricuspid Valve: There is mild to moderate regurgitation.    Pulmonary Artery: There is mild pulmonary hypertension. The estimated pulmonary artery systolic pressure is 41 mmHg.    IVC/SVC: Normal venous pressure at 3 mmHg.      Results for orders placed during the hospital encounter of 03/20/23    Echo    Interpretation Summary  · The left ventricle is normal in size with moderate concentric hypertrophy and normal systolic function.  · The estimated ejection fraction is 57%.  · Normal left ventricular diastolic function.  · Normal right ventricular size with normal right ventricular systolic function.  · Moderate to severe tricuspid regurgitation.  · Mild-to-moderate mitral regurgitation.  · Moderate left atrial enlargement.  · Mild right atrial enlargement.  · There is mild pulmonary hypertension.      Results for orders placed during the hospital encounter of 03/06/23    Echo    Interpretation Summary  · The left ventricle is normal in size with normal systolic function.  · The estimated ejection fraction is 60%.  · Grade II left ventricular diastolic dysfunction.  · Normal right ventricular size with normal right ventricular systolic function.  · Moderate left atrial enlargement.  · Mild-to-moderate mitral regurgitation.  · Moderate to severe tricuspid regurgitation.  · There is mild pulmonary hypertension.  · There is mild aortic valve stenosis.  · Aortic valve area is 1.78 cm2; peak velocity is 2.01 m/s; mean gradient is 9 mmHg.      Results for orders placed during the hospital encounter of 01/26/22    Echo    Interpretation Summary  · The left ventricle is normal in size with mild  concentric hypertrophy and normal systolic function.  · The estimated ejection fraction is 65%.  · Grade II left ventricular diastolic dysfunction.  · Atrial fibrillation not observed.  · Normal right ventricular size with normal right ventricular systolic function.  · Moderate to severe left atrial enlargement.  · Mild right atrial enlargement.  · Mild mitral regurgitation.  · Mild to moderate tricuspid regurgitation.  · Normal central venous pressure (3 mmHg).  · The estimated PA systolic pressure is 36 mmHg.  · Mildly elevated gradient accross mitral valve of around 6 mmHg at HR of 64 bpm. MVA by pressure half time is normal, however this can be inaccurate.      CURRENT/PREVIOUS VISIT EKG  Results for orders placed or performed during the hospital encounter of 07/15/24   EKG 12-lead    Collection Time: 07/15/24  6:32 PM   Result Value Ref Range    QRS Duration 110 ms    [facility] QTC Calculation 610 ms    Narrative    Test Reason : R07.9,    Vent. Rate : 044 BPM     Atrial Rate : 044 BPM     P-R Int : 184 ms          QRS Dur : 110 ms      QT Int : 714 ms       P-R-T Axes : 091 -06 -02 degrees     QTc Int : 610 ms    Marked sinus bradycardia  Nonspecific T wave abnormality  Prolonged QT  Abnormal ECG  When compared with ECG of 15-JUL-2024 06:17,  Sinus rhythm has replaced Ectopic atrial rhythm  T wave inversion less evident in Anterior leads    Referred By: [email or web address]   SELF           Confirmed By:            ASSESSMENT/PLAN:     Active Hospital Problems    Diagnosis    Bradycardia    Paroxysmal atrial fibrillation    CAD s/p PCI    End stage renal disease on dialysis       ASSESSMENT & PLAN:   Bradycardia  Paroxysmal atrial fibrillation  CAD,   History of PCI  Severe mitral valve stenosis  ESRD on HD  Hypertension    RECOMMENDATIONS:  Mild troponin elevation chronic, stable from baseline, likely 2/2 ESRD  Echo in June 20, 2024 showed EF 60-65% and severe mitral stenosis    Junctional rhythm on EKG today  Continue to  hold amiodarone and metoprolol for now  Recommend restarting amiodarone once heart rate normalizes  Recommend stop eyedrops with timolol and switch to non beta-blocker eyedrop.  Continue to observe closely on continuous telemetry  Continue Eliquis for anticoagulation for PAF  Nuclear stress test in June 2024- negative for reversible ischemia  Recommend close follow-up with Dr. Thompson upon discharge    Tsering Bird NP  [facility]  Department of Cardiology  Date of Service: 07/16/2024        I have personally interviewed and examined the patient, I have reviewed the Nurse Practitioner's history and physical, assessment, and plan. I agree with the findings and plan.      Dr. Meche GUTHRIE  [facility]  Department of Cardiology  Date of Service: 07/16/2024  10:00 AM

## 2024-07-16 NOTE — PROGRESS NOTES
[facility] - Emergency Dept  Hospital Medicine  Progress Note    Patient Name: Tyra Isaac  MRN: [id number]  Patient Class: IP- Inpatient   Admission Date: 7/15/2024  Length of Stay: 0 days  Attending Physician: Lizz Yap MD  Primary Care Provider: Kalpesh Goel MD        Subjective:     Principal Problem:Bradycardia        HPI:  84 year old female with PMH significant for ESRD on HD MWF, A-fib on Eliquis, HTN, CVA presented to the ER with chief complaint of shortness of breath and weakness.  Patient reports that her last HD session was this past Friday 7/12/24.  Over the weekend, she began to feel progressively more short of breath with associated generalized weakness and fatigue.  She woke up short of breath this morning which prompted ER evaluation rather than going to her HD appointment.  Patient denies chest pain, palpitations, abdominal pain, vomiting.  Does endorse mild dizziness and nausea.  Denies syncope.  In the ER, patient found to be markedly bradycardic with HR 30-40s.  BP stable.  Mentation at baseline.  Troponin mildly elevated at 32.7 with repeat 31.8.  BNP elevated.  Electrolytes stable.  Patient was recently admitted to the hospital and went into SVT/afib RVR during dialysis; cardiology was consulted and she had a negative stress test and was discharged on amiodarone and metoprolol.  Will admit patient for further evaluation and treatment.     Overview/Hospital Course:  Patient monitored closely during hospitalization.  She was admitted for weakness and bradycardia.  Cardiology was consulted.  Her home amiodarone and metoprolol were held.  She was monitored on continuous telemetry.  Nephrology was consulted for her dialysis management.  PT/OT was consulted.       Interval History: Patient seen in ER.  NAD.  Reports continued generalized weakness.  Denies chest pain.  Mild shortness of breath and cough reported.  Tolerated HD yesterday with 2L removed.   Remains  bradycardic.  Continue to monitor and hold amio/betablocker.  Cardiology following.   PT/OT consulted     Review of Systems   Constitutional:  Positive for fatigue.   Respiratory:  Positive for shortness of breath.    Cardiovascular:  Negative for chest pain and palpitations.   Gastrointestinal:  Negative for abdominal pain and nausea.     Objective:     Vital Signs (Most Recent):  Temp: 98.5 °F (36.9 °C) (07/16/24 1215)  Pulse: (!) 58 (07/16/24 1356)  Resp: 16 (07/16/24 1215)  BP: (!) 124/50 (07/16/24 1215)  SpO2: 97 % (07/16/24 1356) Vital Signs (24h Range):  Temp:  [97.8 °F (36.6 °C)-98.5 °F (36.9 °C)] 98.5 °F (36.9 °C)  Pulse:  [40-61] 58  Resp:  [16-24] 16  SpO2:  [95 %-99 %] 97 %  BP: (106-190)/(42-66) 124/50     Weight: 46.3 kg (102 lb)  Body mass index is 16.97 kg/m².    Intake/Output Summary (Last 24 hours) at 7/16/2024 1420  Last data filed at 7/16/2024 0957  Gross per 24 hour   Intake 680 ml   Output 2500 ml   Net -1820 ml         Physical Exam  Vitals and nursing note reviewed.   Constitutional:       General: She is not in acute distress.  Cardiovascular:      Rate and Rhythm: Regular rhythm. Bradycardia present.   Pulmonary:      Effort: Pulmonary effort is normal. No respiratory distress.      Breath sounds: Normal breath sounds.   Abdominal:      Palpations: Abdomen is soft.      Tenderness: There is no abdominal tenderness.   Musculoskeletal:      Right lower leg: No edema.      Left lower leg: No edema.   Skin:     General: Skin is warm and dry.   Neurological:      Mental Status: She is alert and oriented to person, place, and time.             Significant Labs: All pertinent labs within the past 24 hours have been reviewed.  CBC:   Recent Labs   Lab 07/15/24  0559 07/15/24  0621 07/16/24  0420   WBC 6.28  --  6.24   HGB 11.4*  --  10.6*   HCT 39.4 36 36.3*     --  262     CMP:   Recent Labs   Lab 07/15/24  0559 07/16/24  0420    138   K 4.9 5.0   CL 96 104   CO2 32* 23    75    BUN 64* 34*   CREATININE 9.4* 6.8*   CALCIUM 9.8 9.0   PROT 7.7 6.9   ALBUMIN 4.2 3.6   BILITOT 0.5 0.5   ALKPHOS 81 75   AST 20 17   ALT 31 25   ANIONGAP 12 11     Magnesium:   Recent Labs   Lab 07/15/24  0559 07/16/24  0420   MG 2.3 2.2       Significant Imaging: I have reviewed all pertinent imaging results/findings within the past 24 hours.    Assessment/Plan:      * Bradycardia  Hemodynamically stable, alert   Hold home amiodarone and metoprolol   Continuous cardiac monitoring   Consult Cardiology       CAD s/p PCI  Patient with known CAD s/p stent placement, which is controlled Will continue Statin and monitor for S/Sx of angina/ACS. Continue to monitor on telemetry.     Paroxysmal atrial fibrillation  Patient with atrial fibrillation which is controlled currently with Beta Blocker and Amiodarone. Patient is currently in sinus bradycardia.[id number] Score: 4. Anticoagulation indicated. Anticoagulation done with renally dosed Eliquis .  Holding home amiodarone/beta blocker at this time due to bradycardia   Cardiology consulted     End stage renal disease on dialysis  Creatine stable for now. BMP reviewed- noted Estimated Creatinine Clearance: 4.5 mL/min (A) (based on SCr of 6.8 mg/dL (H)). according to latest data. Based on current GFR, CKD stage is end stage.  Monitor UOP and serial BMP and adjust therapy as needed. Renally dose meds. Avoid nephrotoxic medications and procedures.  Nephrology consulted for hemodialysis management       VTE Risk Mitigation (From admission, onward)           Ordered     apixaban tablet 2.5 mg  2 times daily         07/16/24 0723     IP VTE HIGH RISK PATIENT  Once         07/15/24 0813     Place sequential compression device  Until discontinued         07/15/24 0813                    Discharge Planning   ILAN: 7/18/2024     Code Status: Full Code   Is the patient medically ready for discharge?:     Reason for patient still in hospital (select all that apply): Patient trending  condition and Consult recommendations  Discharge Plan A: Home Health                  Jacquie Ascencio NP  Department of Hospital Medicine   [facility] - Emergency Dept

## 2024-07-16 NOTE — NURSING
EKG 1028 am 7/16/24, Junctional rhythm minimal voltage criteria for LVH, may be normal variant, nonspecific ST and T wave abnormality, prolonged QT, abnormal ECG , notified Tsering MELENDEZ/NIRMAL

## 2024-07-16 NOTE — ASSESSMENT & PLAN NOTE
Creatine stable for now. BMP reviewed- noted Estimated Creatinine Clearance: 4.5 mL/min (A) (based on SCr of 6.8 mg/dL (H)). according to latest data. Based on current GFR, CKD stage is end stage.  Monitor UOP and serial BMP and adjust therapy as needed. Renally dose meds. Avoid nephrotoxic medications and procedures.  Nephrology consulted for hemodialysis management

## 2024-07-16 NOTE — PT/OT/SLP EVAL
Occupational Therapy   Evaluation    Name: Tyra Isaac  MRN: [id number]  Admitting Diagnosis: <principal problem not specified>  Recent Surgery: * No surgery found *      Recommendations:     Discharge Recommendations: Low Intensity Therapy  Discharge Equipment Recommendations:  none  Barriers to discharge:  None    Assessment:     Tyra Isaac is a 84 y.o. female with a medical diagnosis of <principal problem not specified>.  She presents with general weakness. Performance deficits affecting function: weakness, impaired endurance, impaired self care skills, impaired functional mobility, gait instability, impaired balance, decreased safety awareness, decreased lower extremity function, decreased upper extremity function.      Rehab Prognosis: Fair; patient would benefit from acute skilled OT services to address these deficits and reach maximum level of function.       Plan:     Patient to be seen 5 x/week to address the above listed problems via self-care/home management, therapeutic activities, therapeutic exercises  Plan of Care Expires: 08/16/24  Plan of Care Reviewed with: patient    Subjective     Chief Complaint: General weakness  Patient/Family Comments/goals: Improved functional mobility and ADL independence.    Occupational Profile:  Living Environment: lives with son and ROCIO in 1 story home, no steps to enter.  Previous level of function: able to perform transfers using rolling walker, Uses wheelchair for mobility. DIL assists with bathing. Relies on DIL for household management and transportation.  Roles and Routines: Limited homemaker  Equipment Used at Home: rollator, wheelchair, bedside commode  Assistance upon Discharge: Son and ROCIO    Pain/Comfort:  Pain Rating 1: 0/10  Pain Rating Post-Intervention 1: 0/10    Patients cultural, spiritual, Mandaen conflicts given the current situation: no    Objective:     Communicated with: nurse prior to session.  Patient found HOB elevated with  telemetry, peripheral IV upon OT entry to room.    General Precautions: Standard, fall  Orthopedic Precautions: N/A  Braces: N/A  Respiratory Status: Room air    Occupational Performance:    Bed Mobility:    Patient completed Scooting/Bridging with moderate assistance  Patient completed Supine to Sit with moderate assistance  Patient completed Sit to Supine with moderate assistance  Performed unsupported sitting EOB with contact guard assistance.     Cognitive/Visual Perceptual:  Cognitive/Psychosocial Skills:     -       Oriented to: Person and Place   -       Follows Commands/attention:Follows one-step commands  -       Communication: clear/fluent  -       Memory: No Deficits noted  -       Safety awareness/insight to disability: impaired   -       Mood/Affect/Coping skills/emotional control: Cooperative and Pleasant  Visual/Perceptual:      -Intact Acuity    Physical Exam:  Balance:    -       Sitting: Contact Guard  Upper Extremity Range of Motion:     -       Right Upper Extremity: WFL  -       Left Upper Extremity: WFL  Upper Extremity Strength:    -       Right Upper Extremity: 3/5  -       Left Upper Extremity: 3/5   Strength:    -       Right Upper Extremity: fair  -       Left Upper Extremity: fair  Fine Motor Coordination:    -       Intact    AMPAC 6 Click ADL:  AMPAC Total Score: 16    Treatment & Education:  Patient educated on the purpose of Occupational Therapy and the importance of getting OOB.    Patient left HOB elevated with all lines intact    GOALS:   Multidisciplinary Problems       Occupational Therapy Goals          Problem: Occupational Therapy    Goal Priority Disciplines Outcome Interventions   Occupational Therapy Goal     OT, PT/OT     Description: Goals to be met by: 8/16/2024     Patient will increase functional independence with ADLs by performing:    UE Dressing with Stand-by Assistance.  LE Dressing with Stand-by Assistance.  Grooming while seated with Stand-by  Assistance.  Toileting from toilet with Stand-by Assistance for hygiene and clothing management.   Toilet transfer to toilet with Stand-by Assistance.                         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    Osteopenia of multiple sites 3/9/2018    Based upon bone density measurements. Patient also has chronic kidney disease.    Stroke 11/2016         Past Surgical History:   Procedure Laterality Date    ANGIOGRAM, CORONARY, WITH LEFT HEART CATHETERIZATION N/A 2/7/2022    Procedure: Angiogram, Coronary, with Left Heart Cath;  Surgeon: Gino Leal MD;  Location: [facility] CATH[facility]EP LAB;  Service: Cardiology;  Laterality: N/A;    CARDIAC SURGERY      stents    EYE SURGERY      WRIST SURGERY         Time Tracking:     OT Date of Treatment: 07/16/24  OT Start Time: 1141  OT Stop Time: 1151  OT Total Time (min): 10 min    Billable Minutes:Evaluation 10    7/16/2024

## 2024-07-16 NOTE — HOSPITAL COURSE
Patient monitored closely during hospitalization.  She was admitted for weakness and bradycardia.  Cardiology was consulted.  Her home amiodarone and metoprolol were held.  She was monitored on continuous telemetry.  Nephrology was consulted for her dialysis management.    Cardiology consulted: Continue to hold metoprolol for now, restart low-dose amiodarone 100 mg daily - monitor heart rate closely.  Continuous telemetry, Continue Eliquis 2.5 mg BID for anticoagulation for PAF  Recommend stop eyedrops with timolol and switch to non beta-blocker eyedrop. PT/OT was consulted and recommended SNF but patient declined, son agreeable to take her home with home health and we will likely process 7/20 as she finished dialysis late 7/19.

## 2024-07-17 LAB
ALBUMIN SERPL BCP-MCNC: 3.7 G/DL (ref 3.5–5.2)
ALP SERPL-CCNC: 75 U/L (ref 55–135)
ALT SERPL W/O P-5'-P-CCNC: 19 U/L (ref 10–44)
ANION GAP SERPL CALC-SCNC: 13 MMOL/L (ref 8–16)
AST SERPL-CCNC: 16 U/L (ref 10–40)
BASOPHILS # BLD AUTO: 0.02 K/UL (ref 0–0.2)
BASOPHILS NFR BLD: 0.3 % (ref 0–1.9)
BILIRUB SERPL-MCNC: 0.2 MG/DL (ref 0.1–1)
BUN SERPL-MCNC: 49 MG/DL (ref 8–23)
CALCIUM SERPL-MCNC: 9.3 MG/DL (ref 8.7–10.5)
CHLORIDE SERPL-SCNC: 104 MMOL/L (ref 95–110)
CO2 SERPL-SCNC: 20 MMOL/L (ref 23–29)
CREAT SERPL-MCNC: 8.6 MG/DL (ref 0.5–1.4)
DIFFERENTIAL METHOD BLD: ABNORMAL
EOSINOPHIL # BLD AUTO: 0.5 K/UL (ref 0–0.5)
EOSINOPHIL NFR BLD: 9 % (ref 0–8)
ERYTHROCYTE [DISTWIDTH] IN BLOOD BY AUTOMATED COUNT: 18.1 % (ref 11.5–14.5)
EST. GFR  (NO RACE VARIABLE): 4.2 ML/MIN/1.73 M^2
GLUCOSE SERPL-MCNC: 93 MG/DL (ref 70–110)
HCT VFR BLD AUTO: 40.2 % (ref 37–48.5)
HGB BLD-MCNC: 11.4 G/DL (ref 12–16)
IMM GRANULOCYTES # BLD AUTO: 0.01 K/UL (ref 0–0.04)
IMM GRANULOCYTES NFR BLD AUTO: 0.2 % (ref 0–0.5)
LYMPHOCYTES # BLD AUTO: 1.3 K/UL (ref 1–4.8)
LYMPHOCYTES NFR BLD: 22.3 % (ref 18–48)
MAGNESIUM SERPL-MCNC: 2.5 MG/DL (ref 1.6–2.6)
MCH RBC QN AUTO: 25.9 PG (ref 27–31)
MCHC RBC AUTO-ENTMCNC: 28.4 G/DL (ref 32–36)
MCV RBC AUTO: 91 FL (ref 82–98)
MONOCYTES # BLD AUTO: 1.2 K/UL (ref 0.3–1)
MONOCYTES NFR BLD: 20.6 % (ref 4–15)
NEUTROPHILS # BLD AUTO: 2.8 K/UL (ref 1.8–7.7)
NEUTROPHILS NFR BLD: 47.6 % (ref 38–73)
NRBC BLD-RTO: 0 /100 WBC
PLATELET # BLD AUTO: 253 K/UL (ref 150–450)
PMV BLD AUTO: 9.8 FL (ref 9.2–12.9)
POTASSIUM SERPL-SCNC: 5.8 MMOL/L (ref 3.5–5.1)
PROT SERPL-MCNC: 6.9 G/DL (ref 6–8.4)
RBC # BLD AUTO: 4.4 M/UL (ref 4–5.4)
SODIUM SERPL-SCNC: 137 MMOL/L (ref 136–145)
WBC # BLD AUTO: 5.92 K/UL (ref 3.9–12.7)

## 2024-07-17 PROCEDURE — 85025 COMPLETE CBC W/AUTO DIFF WBC: CPT | Performed by: NURSE PRACTITIONER

## 2024-07-17 PROCEDURE — 36415 COLL VENOUS BLD VENIPUNCTURE: CPT | Performed by: NURSE PRACTITIONER

## 2024-07-17 PROCEDURE — 94761 N-INVAS EAR/PLS OXIMETRY MLT: CPT

## 2024-07-17 PROCEDURE — 21400001 HC TELEMETRY ROOM

## 2024-07-17 PROCEDURE — 25000003 PHARM REV CODE 250: Performed by: NURSE PRACTITIONER

## 2024-07-17 PROCEDURE — 99233 SBSQ HOSP IP/OBS HIGH 50: CPT | Mod: ,,, | Performed by: GENERAL PRACTICE

## 2024-07-17 PROCEDURE — 90935 HEMODIALYSIS ONE EVALUATION: CPT

## 2024-07-17 PROCEDURE — 63600175 PHARM REV CODE 636 W HCPCS: Mod: JZ,JG | Performed by: INTERNAL MEDICINE

## 2024-07-17 PROCEDURE — 80053 COMPREHEN METABOLIC PANEL: CPT | Performed by: NURSE PRACTITIONER

## 2024-07-17 PROCEDURE — 63600175 PHARM REV CODE 636 W HCPCS: Performed by: NURSE PRACTITIONER

## 2024-07-17 PROCEDURE — 97530 THERAPEUTIC ACTIVITIES: CPT | Mod: CQ

## 2024-07-17 PROCEDURE — 83735 ASSAY OF MAGNESIUM: CPT | Performed by: NURSE PRACTITIONER

## 2024-07-17 RX ORDER — CARBOXYMETHYLCELLULOSE SODIUM 5 MG/ML
1 SOLUTION/ DROPS OPHTHALMIC 3 TIMES DAILY PRN
Status: DISCONTINUED | OUTPATIENT
Start: 2024-07-17 | End: 2024-07-20 | Stop reason: HOSPADM

## 2024-07-17 RX ADMIN — HYDRALAZINE HYDROCHLORIDE 10 MG: 20 INJECTION INTRAMUSCULAR; INTRAVENOUS at 12:07

## 2024-07-17 RX ADMIN — APIXABAN 2.5 MG: 2.5 TABLET, FILM COATED ORAL at 08:07

## 2024-07-17 RX ADMIN — CALCIUM ACETATE 667 MG: 667 CAPSULE ORAL at 12:07

## 2024-07-17 RX ADMIN — CALCIUM ACETATE 667 MG: 667 CAPSULE ORAL at 05:07

## 2024-07-17 RX ADMIN — CALCIUM ACETATE 667 MG: 667 CAPSULE ORAL at 09:07

## 2024-07-17 RX ADMIN — APIXABAN 2.5 MG: 2.5 TABLET, FILM COATED ORAL at 09:07

## 2024-07-17 RX ADMIN — EPOETIN ALFA-EPBX 4600 UNITS: 10000 INJECTION, SOLUTION INTRAVENOUS; SUBCUTANEOUS at 04:07

## 2024-07-17 NOTE — SUBJECTIVE & OBJECTIVE
Interval History: Patient seen in ER.  NAD.  Reports continued generalized weakness.  Denies chest pain.  Mild shortness of breath and cough reported.  Tolerated HD yesterday with 2L removed.   Remains bradycardic.  Continue to monitor and hold amio/betablocker.  Cardiology following.   PT/OT consulted     Review of Systems   Constitutional:  Positive for fatigue.   Respiratory:  Negative for shortness of breath.    Cardiovascular:  Negative for chest pain and palpitations.   Gastrointestinal:  Negative for abdominal pain and nausea.     Objective:     Vital Signs (Most Recent):  Temp: 98.2 °F (36.8 °C) (07/18/24 1523)  Pulse: 75 (07/18/24 1523)  Resp: 20 (07/18/24 1523)  BP: 129/61 (07/18/24 1523)  SpO2: 97 % (07/18/24 1523) Vital Signs (24h Range):  Temp:  [97.4 °F (36.3 °C)-98.8 °F (37.1 °C)] 98.2 °F (36.8 °C)  Pulse:  [60-75] 75  Resp:  [16-20] 20  SpO2:  [95 %-99 %] 97 %  BP: ()/(40-70) 129/61     Weight: 48.4 kg (106 lb 11.2 oz)  Body mass index is 17.76 kg/m².    Intake/Output Summary (Last 24 hours) at 7/18/2024 1540  Last data filed at 7/18/2024 1523  Gross per 24 hour   Intake 1200 ml   Output 1719 ml   Net -519 ml         Physical Exam  Vitals and nursing note reviewed.   Constitutional:       General: She is not in acute distress.  Cardiovascular:      Rate and Rhythm: Normal rate and regular rhythm.   Pulmonary:      Effort: Pulmonary effort is normal. No respiratory distress.      Breath sounds: Normal breath sounds.   Abdominal:      Palpations: Abdomen is soft.      Tenderness: There is no abdominal tenderness.   Musculoskeletal:      Right lower leg: No edema.      Left lower leg: No edema.   Skin:     General: Skin is warm and dry.   Neurological:      Mental Status: She is alert and oriented to person, place, and time.             Significant Labs: All pertinent labs within the past 24 hours have been reviewed.  CBC:   Recent Labs   Lab 07/17/24  0350 07/18/24  0351   WBC 5.92 5.42   HGB  11.4* 12.3   HCT 40.2 41.1    286     CMP:   Recent Labs   Lab 07/17/24  0350 07/18/24  0351    137   K 5.8* 5.1    100   CO2 20* 27   GLU 93 78   BUN 49* 34*   CREATININE 8.6* 7.1*   CALCIUM 9.3 9.6   PROT 6.9 7.7   ALBUMIN 3.7 4.0   BILITOT 0.2 0.5   ALKPHOS 75 80   AST 16 12   ALT 19 16   ANIONGAP 13 10     Magnesium:   Recent Labs   Lab 07/17/24  0350 07/18/24  0351   MG 2.5 2.2       Significant Imaging: I have reviewed all pertinent imaging results/findings within the past 24 hours.

## 2024-07-17 NOTE — PT/OT/SLP PROGRESS
Occupational Therapy      Patient Name:  Tyra Isaac   MRN:  [id number]    Patient not seen today; unavailable (in nursing care) when attempted in AM; plans for dialysis this PM.  Will follow up 7/18.    7/17/2024

## 2024-07-17 NOTE — PROGRESS NOTES
"[facility]  Department of Cardiology  Progress Note      PATIENT NAME: Tyra Isaac  MRN: [id number]  TODAY'S DATE: 07/17/2024  ADMIT DATE: 7/15/2024                          CONSULT REQUESTED BY: German Michael DO    SUBJECTIVE     PRINCIPAL PROBLEM: Bradycardia      REASON FOR CONSULT:  Bradycardia    Interval history:  7/17/24  Resting in bed, complaints of weakness, feeling shaky  Awaiting dialysis today  Denies chest pain or dyspnea  Sinus rhythm, heart rate 60 beats minute on telemetry    7/16/24  Resting hallway bed, fatigued, heart rate 58 beats per minute.  BP stable    HPI:  Patient is an 84-year-old  female who presented to the ER with complaints of chest pain and shortness of breath.  She states I could not breathe and had tightness in my chest."She has also been coughing and recently medicated with cough syrup.  She was anxious to get to dialysis today as she has not had it since Friday.  She has end-stage renal disease on hemodialysis Monday Wednesday Friday and sees Dr. Valero.  Patient's daughter is present and states a new medication that she was started on at last hospitalization did not agree well with her.  Hospital discharge as below notes discharging her on amiodarone taper but there are also notes that she was on Multaq at home as well.  She was also on Lopressor 12.5 mg b.i.d. at home.  Patient's heart rate on arrival to the emergency room was in the 30s-40s with prolonged QT.    Patient recently discharged from hospital  Hospital Course from 06/27/2024-07/06/2024:   "Patient was admitted with chest pain and hyperkalemia.  Her EKG was nonischemic and her troponins were noted to be elevated, which is baseline for her 2/2 ESRD. She remained chest pain free during her stay.  Nephrology was consulted for routine dialysis.  She was maintained on telemetry and cardiology was consulted.  She received medications to shift her potassium and dialysis was ordered.  " "Her dialysis was cut short due to syncopal episode with hypotension.  She was monitored overnight.  She went for stress testing, which was negative for reversible ischemia.  She had another episode of severe tachycardia after dialysis and needed Lopressor and Cardizem IV and moved to ICU.  On 7/1 during dialysis patient became tachycardic and hypotensive in afib RVR. She was cardioverted and started on amio drip and phenylephrine drip.  Palliative Care was consulted, patient wishes to remain full code at this time until she discusses it further with her family.  Patient's heart rate and BP remained stable.  The amiodarone drip was transitioned to PO and patient was transferred out of ICU on 7/4/24.  Patient had hemodialysis 7/5/24 and tolerated this well.  PT/OT was consulted who recommended home health which was ordered.  Patient seen and examined on day of discharge and deemed stable for discharge home with home health.  She will need to follow up with her PCP, Cardiologist, and Nephrologist in 1 week.  Patient agreeable with plan and eager for discharge home. "        Review of patient's allergies indicates:   Allergen Reactions    Cyclobenzaprine     Fish containing products Hives    Peanut Other (See Comments)    Tramadol Itching       Past Medical History:   Diagnosis Date    A-fib     Anxiety     Depression     Disorder of kidney and ureter     Encephalopathy acute 1/1/2018    End stage kidney disease 6/17/2017    Gout     Hyperlipidemia     Hypertension     Moderate episode of recurrent major depressive disorder 1/17/2018    Nephropathy hypertensive, 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    Osteopenia of multiple sites 3/9/2018    Based upon bone density measurements. Patient also has chronic kidney disease.    Stroke 11/2016     Past Surgical History:   Procedure Laterality " Date    ANGIOGRAM, CORONARY, WITH LEFT HEART CATHETERIZATION N/A 2/7/2022    Procedure: Angiogram, Coronary, with Left Heart Cath;  Surgeon: Gino Leal MD;  Location: [facility] CATH/EP LAB;  Service: Cardiology;  Laterality: N/A;    CARDIAC SURGERY      stents    EYE SURGERY      WRIST SURGERY       Social History     Tobacco Use    Smoking status: Never    Smokeless tobacco: Never   Substance Use Topics    Alcohol use: No    Drug use: No        REVIEW OF SYSTEMS  Negative except as mentioned in HPI    OBJECTIVE     VITAL SIGNS (Most Recent)  Temp: 97.8 °F (36.6 °C) (07/17/24 0745)  Pulse: (!) 55 (07/17/24 0842)  Resp: 15 (07/17/24 0842)  BP: (!) 185/76 (07/17/24 0745)  SpO2: 97 % (07/17/24 0842)    VENTILATION STATUS  Resp: 15 (07/17/24 0842)  SpO2: 97 % (07/17/24 0842)           I & O (Last 24H):  Intake/Output Summary (Last 24 hours) at 7/17/2024 1107  Last data filed at 7/17/2024 0800  Gross per 24 hour   Intake 240 ml   Output --   Net 240 ml       WEIGHTS  Wt Readings from Last 3 Encounters:   07/16/24 1951 46.7 kg (102 lb 15.3 oz)   07/15/24 0542 46.3 kg (102 lb)   07/11/24 1030 46.3 kg (102 lb)   06/28/24 0300 48.7 kg (107 lb 5.8 oz)   06/27/24 2343 47.6 kg (105 lb)       PHYSICAL EXAM    GENERAL:  Elderly fatigued thin female resting in bed in no apparent distress.  HEENT: Normocephalic.    NECK: No JVD.   CARDIAC: Regular  rate and rhythm.  + murmur  CHEST ANATOMY: normal.   LUNGS:  Diminished at bases   ABDOMEN: Soft .  Normal bowel sounds.    EXTREMITIES:  Right arm fistula for dialysis in place.    CENTRAL NERVOUS SYSTEM: AAO x 3  SKIN: No rash     HOME MEDICATIONS:  No current facility-administered medications on file prior to encounter.     Current Outpatient Medications on File Prior to Encounter   Medication Sig Dispense Refill    amiodarone (PACERONE) 200 MG Tab Take 1 tablet (200 mg total) by mouth 3 (three) times daily for 14 days, THEN 1 tablet (200 mg total) 2 (two) times daily for 7 days. 56  tablet 0    atorvastatin (LIPITOR) 40 MG tablet Take 40 mg by mouth once daily.      b complex vitamins tablet Take 1 tablet by mouth once daily.      dorzolamide-timolol 2-0.5% (COSOPT) 22.3-6.8 mg/mL ophthalmic solution Place 1 drop into both eyes 2 (two) times daily.      ELIQUIS 2.5 mg Tab Take 1 tablet (2.5 mg total) by mouth 2 (two) times daily. 60 tablet 0    latanoprost 0.005 % ophthalmic solution Place 1 drop into both eyes every evening.      metoclopramide HCl (REGLAN) 5 MG tablet Take 1 tablet (5 mg total) by mouth 3 (three) times daily before meals. 30 tablet 1    metoprolol tartrate (LOPRESSOR) 25 MG tablet Take 0.5 tablets (12.5 mg total) by mouth 2 (two) times daily. 30 tablet 0    midodrine (PROAMATINE) 10 MG tablet Take 1 tablet (10 mg total) by mouth 3 (three) times daily with meals. 270 tablet 0    ondansetron (ZOFRAN-ODT) 4 MG TbDL Take 1 tablet (4 mg total) by mouth every 6 (six) hours as needed (nausea). 30 tablet 5    loperamide (IMODIUM A-D) 2 mg Tab Take 1 tablet (2 mg total) by mouth 4 (four) times daily as needed (diarrhea). (Patient not taking: Reported on 7/15/2024) 3 tablet 0       SCHEDULED MEDS:   apixaban  2.5 mg Oral BID    calcium acetate(phosphat bind)  667 mg Oral TID WM    epoetin rosy-ebpx (RETACRIT) injection  100 Units/kg Intravenous Every Mon, Wed, Fri       CONTINUOUS INFUSIONS:    PRN MEDS:  Current Facility-Administered Medications:     acetaminophen, 650 mg, Oral, Q8H PRN    acetaminophen, 650 mg, Oral, Q4H PRN    aluminum-magnesium hydroxide-simethicone, 30 mL, Oral, QID PRN    dextrose 50%, 12.5 g, Intravenous, PRN    dextrose 50%, 25 g, Intravenous, PRN    glucagon (human recombinant), 1 mg, Intramuscular, PRN    glucose, 16 g, Oral, PRN    glucose, 24 g, Oral, PRN    guaiFENesin 100 mg/5 ml, 200 mg, Oral, Q6H PRN    hydrALAZINE, 10 mg, Intravenous, Q6H PRN    magnesium oxide, 800 mg, Oral, PRN    magnesium oxide, 800 mg, Oral, PRN    melatonin, 6 mg, Oral, Nightly  "PRN    naloxone, 0.02 mg, Intravenous, PRN    ondansetron, 4 mg, Intravenous, Q6H PRN    potassium bicarbonate, 35 mEq, Oral, PRN    potassium bicarbonate, 50 mEq, Oral, PRN    potassium bicarbonate, 60 mEq, Oral, PRN    potassium, sodium phosphates, 2 packet, Oral, PRN    potassium, sodium phosphates, 2 packet, Oral, PRN    potassium, sodium phosphates, 2 packet, Oral, PRN    senna-docusate 8.6-50 mg, 1 tablet, Oral, Daily PRN    sodium chloride 0.9%, 3 mL, Intravenous, Q12H PRN    LABS AND DIAGNOSTICS     CBC LAST 3 DAYS  Recent Labs   Lab 07/15/24  0559 07/15/24  0621 07/16/24 0420 07/17/24  0350   WBC 6.28  --  6.24 5.92   RBC 4.45  --  4.07 4.40   HGB 11.4*  --  10.6* 11.4*   HCT 39.4 36 36.3* 40.2   MCV 89  --  89 91   MCH 25.6*  --  26.0* 25.9*   MCHC 28.9*  --  29.2* 28.4*   RDW 17.3*  --  17.2* 18.1*     --  262 253   MPV 9.9  --  10.1 9.8   GRAN 59.9  3.8  --  57.4  3.6 47.6  2.8   LYMPH 18.6  1.2  --  16.8*  1.1 22.3  1.3   MONO 11.8  0.7  --  15.9*  1.0 20.6*  1.2*   BASO 0.03  --  0.04 0.02   NRBC 0  --  0 0       COAGULATION LAST 3 DAYS  Recent Labs   Lab 07/15/24  0559   INR 1.2   APTT 34.2*       CHEMISTRY LAST 3 DAYS  Recent Labs   Lab 07/15/24  0559 07/15/24  0621 07/16/24  0420 07/17/24  0350     --  138 137   K 4.9  --  5.0 5.8*   CL 96  --  104 104   CO2 32*  --  23 20*   ANIONGAP 12  --  11 13   BUN 64*  --  34* 49*   CREATININE 9.4*  --  6.8* 8.6*     --  75 93   CALCIUM 9.8  --  9.0 9.3   PH  --  7.345*  --   --    MG 2.3  --  2.2 2.5   ALBUMIN 4.2  --  3.6 3.7   PROT 7.7  --  6.9 6.9   ALKPHOS 81  --  75 75   ALT 31  --  25 19   AST 20  --  17 16   BILITOT 0.5  --  0.5 0.2       CARDIAC PROFILE LAST 3 DAYS  Recent Labs   Lab 07/15/24  0559   BNP 3,875*       ENDOCRINE LAST 3 DAYS  No results for input(s): "TSH", "PROCAL" in the last 168 hours.    LAST ARTERIAL BLOOD GAS  ABG  Recent Labs   Lab 07/15/24  0621   PH 7.345*   PO2 25*   PCO2 58.3*   HCO3 31.8*   BE " 6*       LAST 7 DAYS MICROBIOLOGY   Microbiology Results (last 7 days)       ** No results found for the last 168 hours. **            MOST RECENT IMAGING  X-Ray Chest AP Portable  Narrative: EXAMINATION:  XR CHEST AP PORTABLE    CLINICAL HISTORY:  Chest Pain;    TECHNIQUE:  Single frontal view of the chest was performed.    COMPARISON:  Chest radiograph performed 07/01/2024, 13:12 hours.    FINDINGS:  Monitoring leads overlie the chest.  Grossly unchanged cardiomediastinal contours.    Chronic interstitial coarsening is noted.    Ill-defined bibasilar opacities.    No definite pneumothorax or large volume pleural effusion.  No acute findings in the visualized abdomen.    Osseous and soft tissue structures appear without definite acute change.  Impression: Ill-defined bibasilar opacities may relate to atelectasis, aspiration, or pneumonia.    Electronically signed by: Phillip Magaña  Date:    07/15/2024  Time:    06:54      ECHOCARDIOGRAM RESULTS (last 5)  Results for orders placed during the hospital encounter of 06/27/24    Echo    Interpretation Summary    Left Ventricle: The left ventricle is normal in size. Normal wall thickness. There is concentric remodeling. There is normal systolic function with a visually estimated ejection fraction of 60 - 65%. Biplane (2D) method of discs ejection fraction is 64%.    Right Ventricle: Normal right ventricular cavity size. Wall thickness is normal. Systolic function is normal.    Left Atrium: Left atrium is mildly dilated.    Aortic Valve: The aortic valve is a trileaflet valve. Mildly calcified left, right and noncoronary cusps. Mildly restricted motion. There is mild stenosis. Aortic valve area by VTI is 1.80 cm². Aortic valve peak velocity is 1.22 m/s. Mean gradient is 3 mmHg. The dimensionless index is 0.79.    Mitral Valve: There is moderate mitral annular calcification present. There is severe stenosis. The mean pressure gradient across the mitral valve is 9 mmHg at  a heart rate of  bpm. There is mild regurgitation with a centrally directed jet.      Results for orders placed during the hospital encounter of 01/04/24    Echo    Interpretation Summary    Left Ventricle: The left ventricle is normal in size. Normal wall thickness. Normal wall motion. There is normal systolic function with a visually estimated ejection fraction of 60 - 65%. There is diastolic dysfunction.    Right Ventricle: Normal right ventricular cavity size. Wall thickness is normal. Right ventricle wall motion  is normal. Systolic function is normal.    Left Atrium: Left atrium is severely dilated.    Mitral Valve: There is moderate bileaflet sclerosis. There is moderate mitral annular calcification present. There is mild to moderate regurgitation with a centrally directed jet.    Tricuspid Valve: There is mild to moderate regurgitation.    Pulmonary Artery: There is mild pulmonary hypertension. The estimated pulmonary artery systolic pressure is 41 mmHg.    IVC/SVC: Normal venous pressure at 3 mmHg.      Results for orders placed during the hospital encounter of 03/20/23    Echo    Interpretation Summary  · The left ventricle is normal in size with moderate concentric hypertrophy and normal systolic function.  · The estimated ejection fraction is 57%.  · Normal left ventricular diastolic function.  · Normal right ventricular size with normal right ventricular systolic function.  · Moderate to severe tricuspid regurgitation.  · Mild-to-moderate mitral regurgitation.  · Moderate left atrial enlargement.  · Mild right atrial enlargement.  · There is mild pulmonary hypertension.      Results for orders placed during the hospital encounter of 03/06/23    Echo    Interpretation Summary  · The left ventricle is normal in size with normal systolic function.  · The estimated ejection fraction is 60%.  · Grade II left ventricular diastolic dysfunction.  · Normal right ventricular size with normal right ventricular  systolic function.  · Moderate left atrial enlargement.  · Mild-to-moderate mitral regurgitation.  · Moderate to severe tricuspid regurgitation.  · There is mild pulmonary hypertension.  · There is mild aortic valve stenosis.  · Aortic valve area is 1.78 cm2; peak velocity is 2.01 m/s; mean gradient is 9 mmHg.      Results for orders placed during the hospital encounter of 01/26/22    Echo    Interpretation Summary  · The left ventricle is normal in size with mild concentric hypertrophy and normal systolic function.  · The estimated ejection fraction is 65%.  · Grade II left ventricular diastolic dysfunction.  · Atrial fibrillation not observed.  · Normal right ventricular size with normal right ventricular systolic function.  · Moderate to severe left atrial enlargement.  · Mild right atrial enlargement.  · Mild mitral regurgitation.  · Mild to moderate tricuspid regurgitation.  · Normal central venous pressure (3 mmHg).  · The estimated PA systolic pressure is 36 mmHg.  · Mildly elevated gradient accross mitral valve of around 6 mmHg at HR of 64 bpm. MVA by pressure half time is normal, however this can be inaccurate.      CURRENT/PREVIOUS VISIT EKG  Results for orders placed or performed during the hospital encounter of 07/15/24   EKG 12-lead    Collection Time: 07/16/24 10:28 AM   Result Value Ref Range    QRS Duration 100 ms    [facility] QTC Calculation 571 ms    Narrative    Test Reason : R00.1,    Vent. Rate : 058 BPM     Atrial Rate : 000 BPM     P-R Int : 000 ms          QRS Dur : 100 ms      QT Int : 582 ms       P-R-T Axes : 000 -27 052 degrees     QTc Int : 571 ms    Junctional rhythm  Minimal voltage criteria for LVH, may be normal variant ( [place] product )  Nonspecific ST and T wave abnormality  Prolonged QT  Abnormal ECG  When compared with ECG of 15-JUL-2024 13:40,  Junctional rhythm has replaced Sinus rhythm  Nonspecific T wave abnormality no longer evident in Inferior leads    Referred By: [email or web address]    SELF           Confirmed By:            ASSESSMENT/PLAN:     Active Hospital Problems    Diagnosis    *Bradycardia    Paroxysmal atrial fibrillation    CAD s/p PCI    End stage renal disease on dialysis       ASSESSMENT & PLAN:   Bradycardia  Paroxysmal atrial fibrillation  CAD,   History of PCI  Severe mitral valve stenosis  ESRD on HD  Hypertension    RECOMMENDATIONS:  Mild troponin elevation chronic, stable from baseline, likely 2/2 ESRD  Echo in June 20, 2024 showed EF 60-65% and severe mitral stenosis    Sinus rhythm on telemetry today, heart rate 60 bpm  Continue to hold amiodarone and metoprolol for now  Recommend restarting amiodarone once heart rate normalizes  Recommend stop eyedrops with timolol and switch to non beta-blocker eyedrop.  Continue to observe closely on continuous telemetry  Continue Eliquis for anticoagulation for PAF  Nuclear stress test in June 2024- negative for reversible ischemia  Recommend close follow-up with Dr. Gabriel upon discharge  Ordered PT for strengthening      Tsering Bird NP  [facility]  Department of Cardiology  Date of Service: 07/17/2024        I have personally interviewed and examined the patient, I have reviewed the Nurse Practitioner's history and physical, assessment, and plan. I agree with the findings and plan.  EVALUATED SHE REMAINS IN SINUS BRADYCARDIA.  OKAY TO DISCHARGE FOLLOW-UP WITH DR. GABRIEL REGARDING STARTING BACK AMIODARONE.  DID   EYE DOCTOR REGARDING A POSSIBLE CHANGE OF HER TIMOPTIC    Dr. Meche GUTHRIE  [facility]  Department of Cardiology  Date of Service: 07/17/2024  10:00 AM

## 2024-07-17 NOTE — PT/OT/SLP PROGRESS
Physical Therapy Treatment    Patient Name:  Tyra Isaac   MRN:  [id number]    Recommendations:     Discharge Recommendations: Moderate Intensity Therapy (however, patient refusing placement and requests HHPT)  Discharge Equipment Recommendations: none  Barriers to discharge:  increased assist with mobility, decreased activity tolerance, tremulous, balance deficits    Assessment:     Tyra Isaac is a 84 y.o. female admitted with a medical diagnosis of Bradycardia.  She presents with the following impairments/functional limitations: weakness, impaired endurance, impaired self care skills, impaired functional mobility, gait instability, impaired balance, decreased lower extremity function, decreased safety awareness, impaired cardiopulmonary response to activity.    Pt agreeable to visit. Pt reports that her food tray fell on the floor and that she was sticky and her blankets were sticky. Therapist noted that food tray was on pt's bedside table, fully intact with no evidence of it falling on the floor.    Pt required min to mod assist for supine to sit transfer with most assist for upper body and weight shift into sitting. Pt required min to mod assist to scoot hips towards EOB with pt becoming tremulous and leaning anteriorly.    Pt performed sit to stand transfer with min assist and RW. Pt extremely tremulous upon standing but stable strength wise. Pt able to take a couple side step with RW and min assist after tremors decreased.    Pt returned to bed with min assist.    Rehab Prognosis: Fair; patient would benefit from acute skilled PT services to address these deficits and reach maximum level of function.    Recent Surgery: * No surgery found *      Plan:     During this hospitalization, patient to be seen 5 x/week to address the identified rehab impairments via gait training, therapeutic activities, therapeutic exercises and progress toward the following goals:    Plan of Care Expires:   24    Subjective     Chief Complaint: pt reports that her tray fell on the ground and she and the bed were sticky  Patient/Family Comments/goals: to get better  Pain/Comfort:  Pain Rating 1: 0/10      Objective:     Communicated with nurse prior to session.  Patient found HOB elevated with telemetry, bed alarm upon PT entry to room.     General Precautions: Standard, fall  Orthopedic Precautions: N/A  Braces: N/A  Respiratory Status: Room air     Functional Mobility:  Bed Mobility:     Supine to Sit: minimum assistance and moderate assistance  Sit to Supine: minimum assistance  Transfers:     Sit to Stand:  minimum assistance with rolling walker  Gait: a couple side steps with RW and Diego      AM-PAC 6 CLICK MOBILITY          Treatment & Education:  Pt educated on importance of time OOB, importance of intermittent mobility, safe techniques for transfers/ambulation, discharge recommendations/options, and use of call light for assistance and fall prevention.      Patient left HOB elevated with all lines intact, call button in reach, bed alarm on, and nurse notified..    GOALS:   Multidisciplinary Problems       Physical Therapy Goals          Problem: Physical Therapy    Goal Priority Disciplines Outcome Goal Variances Interventions   Physical Therapy Goal     PT, PT/OT      Description: Goals to be met by: 24    Patient will increase functional independence with mobility by performin. Supine to sit with Supervision  2. Sit to stand transfer with Supervision  3. Bed to chair transfer with Supervision using Rolling Walker  4. Gait  x 100 feet with Supervision using Rolling Walker.   5. Lower extremity exercise program x20 reps per handout, with supervision                       Time Tracking:     PT Received On: 24  PT Start Time: 921     PT Stop Time: 931  PT Total Time (min): 10 min     Billable Minutes: Therapeutic Activity 10    Treatment Type: Treatment  PT/PTA: PTA     Number of PTA  visits since last PT visit: 1 07/17/2024

## 2024-07-17 NOTE — PROGRESS NOTES
1117 ml net uf removed   07/17/24 1715   Required for all Hemodialysis Patients   Hepatitis Status negative   Handoff Report   Received From Uma   Given To Brooklynn   Treatment Type   Treatment Type Maintenance   Vital Signs   Temp 97.4 °F (36.3 °C)   Temp Source Oral   Pulse 71   Resp 18   SpO2 95 %   Device (Oxygen Therapy) room air   BP (!) 102/46   BP Location Left arm   BP Method Automatic   Patient Position Lying   Assessments (Pre/Post)   Consent Obtained yes   Safety vein preservation armband present   Date Hepatitis Profile Obtained 11/10/23   Blood Liters Processed (BLP) 45   Transport Modality not applicable   Level of Consciousness (AVPU) alert   Dialyzer Clearance mildly streaked   Pain   Preferred Pain Scale number (Numeric Rating Pain Scale)   Pain Rating (0-10): Rest 0   Pre-Hemodialysis Assessment   Patient Status Departed        Hemodialysis AV Fistula Right upper arm   No Placement Date or Time found.   Present Prior to Hospital Arrival?: Yes  Location: Right upper arm   Site Assessment Clean;Dry;Intact   Patency Present;Bruit;Thrill   Status Deaccessed   Dressing Status Clean;Dry;Intact   Site Condition No complications   Dressing Gauze   Post-Hemodialysis Assessment   Rinseback Volume (mL) 250 mL   Blood Volume Processed (Liters) 45 L   Dialyzer Clearance Lightly streaked   Duration of Treatment 180 minutes   Additional Fluid Intake (mL) 600 mL   Total UF (mL) 1719 mL   Net Fluid Removal 1119   Patient Response to Treatment tolerated well   Post-Treatment Weight 45.6 kg (100 lb 8.5 oz)   Treatment Weight Change -1.1   Arterial bleeding stop time (min) 5 min   Venous bleeding stop time (min) 5 min   Post-Hemodialysis Comments tx completed     Educated on renal diet

## 2024-07-17 NOTE — NURSING
Nurses Note -- 4 Eyes      7/16/2024   10:02 PM      Skin assessed during: Admit      [x] No Altered Skin Integrity Present    []Prevention Measures Documented      [] Yes- Altered Skin Integrity Present or Discovered   [] LDA Added if Not in Epic (Describe Wound)   [] New Altered Skin Integrity was Present on Admit and Documented in LDA   [] Wound Image Taken    Wound Care Consulted? No    Attending Nurse:  Arley Henao RN/Staff Member:   [id number]

## 2024-07-17 NOTE — PROGRESS NOTES
INPATIENT NEPHROLOGY progress  [facility]    Patient Name: Tyra Isaac  Date: 07/17/2024    Reason for consultation: ESRD    Chief Complaint:   Chief Complaint   Patient presents with    Chest Pain    Shortness of Breath       History of Present Illness:  83 y/o F with hx of ESRD on HD MWF and pAF who p/w dyspnea, no chest pain, no exac/reliev factors, due for HD today, consulted for dialysis.    Interval History:  7/15- HR 40s, -200, on RA, BNP 3800 (900 2 weeks ago), trop leak- seen on HD, attempting 2L UF; CXR  bibasilar opacities may relate to atelectasis, aspiration, or pneumonia.   7/16  still housed and seen in ER.  No complaints, just wants to be moved to a room.  2L off w/HD yesterday, no acute dialysis needs today.  VSS.  7/17  K+ high, to have HD today per pt's schedule.  BP elevated, will UF as tolerated.  Eating breakfast, no complaints.    Plan of Care:    Assessment:  ESRD on HD MWF  Hx of pAF/AF with RVR; Bradycardia   HTN emergency  SHPT  Anemia of CKD    Plan:    - ordered HD MWF  - cards eval pending- supposed to be on metop, amio, eliquis  - UF 2-3L- renal diet, 1.5L fluid restriction  - no acute binder needs  - no acute BRAYAN needs    Thank you for allowing us to participate in this patient's care. We will continue to follow.    Vital Signs:  Temp Readings from Last 3 Encounters:   07/17/24 97.8 °F (36.6 °C) (Oral)   07/06/24 98.6 °F (37 °C) (Oral)   06/03/24 98.5 °F (36.9 °C) (Oral)       Pulse Readings from Last 3 Encounters:   07/17/24 (!) 53   07/11/24 (!) 50   07/06/24 (!) 58       BP Readings from Last 3 Encounters:   07/17/24 (!) 185/76   07/11/24 (!) 138/54   07/06/24 (!) 190/77       Weight:  Wt Readings from Last 3 Encounters:   07/16/24 46.7 kg (102 lb 15.3 oz)   07/11/24 46.3 kg (102 lb)   06/28/24 48.7 kg (107 lb 5.8 oz)       Past Medical & Surgical History:  Past Medical History:   Diagnosis Date    A-fib     Anxiety     Depression     Disorder of  kidney and ureter     Encephalopathy acute 1/1/2018    End stage kidney disease 6/17/2017    Gout     Hyperlipidemia     Hypertension     Moderate episode of recurrent major depressive disorder 1/17/2018    Nephropathy hypertensive, 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    Osteopenia of multiple sites 3/9/2018    Based upon bone density measurements. Patient also has chronic kidney disease.    Stroke 11/2016       Past Surgical History:   Procedure Laterality Date    ANGIOGRAM, CORONARY, WITH LEFT HEART CATHETERIZATION N/A 2/7/2022    Procedure: Angiogram, Coronary, with Left Heart Cath;  Surgeon: Gino Leal MD;  Location: [facility] CATH/EP LAB;  Service: Cardiology;  Laterality: N/A;    CARDIAC SURGERY      stents    EYE SURGERY      WRIST SURGERY         Past Social History:  Social History     Socioeconomic History    Marital status:      Spouse name: Aria Isaac    Number of children: 3   Occupational History    Occupation: Not working   Tobacco Use    Smoking status: Never    Smokeless tobacco: Never   Substance and Sexual Activity    Alcohol use: No    Drug use: No    Sexual activity: Not Currently     Social Determinants of Health     Financial Resource Strain: Medium Risk (7/11/2024)    Overall Financial Resource Strain (CARDIA)     Difficulty of Paying Living Expenses: Somewhat hard   Food Insecurity: No Food Insecurity (7/11/2024)    Hunger Vital Sign     Worried About Running Out of Food in the Last Year: Never true     Ran Out of Food in the Last Year: Never true   Transportation Needs: No Transportation Needs (7/11/2024)    TRANSPORTATION NEEDS     Transportation : No   Physical Activity: Inactive (6/15/2022)    Exercise Vital Sign     Days of Exercise per Week: 0 days     Minutes of Exercise per Session: 0 min   Stress: Stress Concern Present (7/11/2024)     [facility] [place] of Occupational Health - Occupational Stress Questionnaire     Feeling of Stress : To some extent   Housing Stability: Patient Declined (6/30/2024)    Housing Stability Vital Sign     Unable to Pay for Housing in the Last Year: Patient declined     Homeless in the Last Year: Patient declined       Medications:  Scheduled Meds:   apixaban  2.5 mg Oral BID    calcium acetate(phosphat bind)  667 mg Oral TID WM    epoetin rosy-ebpx (RETACRIT) injection  100 Units/kg Intravenous Every Mon, Wed, Fri     Continuous Infusions:  PRN Meds:.  Current Facility-Administered Medications:     acetaminophen, 650 mg, Oral, Q8H PRN    acetaminophen, 650 mg, Oral, Q4H PRN    aluminum-magnesium hydroxide-simethicone, 30 mL, Oral, QID PRN    dextrose 50%, 12.5 g, Intravenous, PRN    dextrose 50%, 25 g, Intravenous, PRN    glucagon (human recombinant), 1 mg, Intramuscular, PRN    glucose, 16 g, Oral, PRN    glucose, 24 g, Oral, PRN    guaiFENesin 100 mg/5 ml, 200 mg, Oral, Q6H PRN    hydrALAZINE, 10 mg, Intravenous, Q6H PRN    magnesium oxide, 800 mg, Oral, PRN    magnesium oxide, 800 mg, Oral, PRN    melatonin, 6 mg, Oral, Nightly PRN    naloxone, 0.02 mg, Intravenous, PRN    ondansetron, 4 mg, Intravenous, Q6H PRN    potassium bicarbonate, 35 mEq, Oral, PRN    potassium bicarbonate, 50 mEq, Oral, PRN    potassium bicarbonate, 60 mEq, Oral, PRN    potassium, sodium phosphates, 2 packet, Oral, PRN    potassium, sodium phosphates, 2 packet, Oral, PRN    potassium, sodium phosphates, 2 packet, Oral, PRN    senna-docusate 8.6-50 mg, 1 tablet, Oral, Daily PRN    sodium chloride 0.9%, 3 mL, Intravenous, Q12H PRN  No current facility-administered medications on file prior to encounter.     Current Outpatient Medications on File Prior to Encounter   Medication Sig Dispense Refill    amiodarone (PACERONE) 200 MG Tab Take 1 tablet (200 mg total) by mouth 3 (three) times daily for 14 days, THEN 1 tablet (200 mg total) 2 (two)  times daily for 7 days. 56 tablet 0    atorvastatin (LIPITOR) 40 MG tablet Take 40 mg by mouth once daily.      b complex vitamins tablet Take 1 tablet by mouth once daily.      dorzolamide-timolol 2-0.5% (COSOPT) 22.3-6.8 mg/mL ophthalmic solution Place 1 drop into both eyes 2 (two) times daily.      ELIQUIS 2.5 mg Tab Take 1 tablet (2.5 mg total) by mouth 2 (two) times daily. 60 tablet 0    latanoprost 0.005 % ophthalmic solution Place 1 drop into both eyes every evening.      metoclopramide HCl (REGLAN) 5 MG tablet Take 1 tablet (5 mg total) by mouth 3 (three) times daily before meals. 30 tablet 1    metoprolol tartrate (LOPRESSOR) 25 MG tablet Take 0.5 tablets (12.5 mg total) by mouth 2 (two) times daily. 30 tablet 0    midodrine (PROAMATINE) 10 MG tablet Take 1 tablet (10 mg total) by mouth 3 (three) times daily with meals. 270 tablet 0    ondansetron (ZOFRAN-ODT) 4 MG TbDL Take 1 tablet (4 mg total) by mouth every 6 (six) hours as needed (nausea). 30 tablet 5    loperamide (IMODIUM A-D) 2 mg Tab Take 1 tablet (2 mg total) by mouth 4 (four) times daily as needed (diarrhea). (Patient not taking: Reported on 7/15/2024) 3 tablet 0       Allergies:  Cyclobenzaprine, Fish containing products, Peanut, and Tramadol    Past Family History:  Reviewed; refer to Hospitalist Admission Note    Review of Systems:  Review of Systems - All 14 systems reviewed and negative, except as noted in HPI    Physical Exam:  General Appearance:    Lethargic, AAO x 3, cooperative, appears stated age   Head:    Normocephalic, atraumatic   Eyes:    PER, EOMI, and conjunctiva/sclera clear bilaterally       Mouth:   Moist mucus membranes, no thrush or oral lesions,       normal dentition   Back:     No CVA tenderness   Lungs:     Coarse   Chest wall:    No tenderness or deformity   Heart:    Regular rate and rhythm, S1 and S2 normal, no murmur, rub   or gallop   Abdomen:     Soft, non-tender, non-distended, bowel sounds active all four    quadrants, no RT or guarding, no masses, no organomegaly   Extremities:   Warm and well perfused, distal pulses are intact, no             cyanosis or peripheral edema   MSK:   No joint or muscle swelling, tenderness or deformity   Skin:   Skin color, texture, turgor normal, no rashes or lesions   Neurologic/Psychiatric:   CNII-XII intact     Results:  Lab Results   Component Value Date     07/17/2024    K 5.8 (H) 07/17/2024     07/17/2024    CO2 20 (L) 07/17/2024    BUN 49 (H) 07/17/2024    CREATININE 8.6 (H) 07/17/2024    CALCIUM 9.3 07/17/2024    ANIONGAP 13 07/17/2024    ESTGFRAFRICA 4.7 (A) 02/08/2022    EGFRNONAA 4.0 (A) 02/08/2022       Lab Results   Component Value Date    CALCIUM 9.3 07/17/2024    PHOS 4.6 (H) 06/30/2024       Recent Labs   Lab 07/17/24  0350   WBC 5.92   RBC 4.40   HGB 11.4*   HCT 40.2      MCV 91   MCH 25.9*   MCHC 28.4*       I have personally reviewed pertinent radiological imaging and reports from this admission.    I have spent > 70 minutes providing care for this patient for the above diagnoses. These services have included chart/data/imaging review, evaluation, exam, formulation of plan, , note preparation, and discussions with staff involved in this patient's care.    Arpita Salas NP    [facility] [address] [place] [address]  [phone] (p)  [phone] (f)

## 2024-07-18 LAB
ALBUMIN SERPL BCP-MCNC: 4 G/DL (ref 3.5–5.2)
ALP SERPL-CCNC: 80 U/L (ref 55–135)
ALT SERPL W/O P-5'-P-CCNC: 16 U/L (ref 10–44)
ANION GAP SERPL CALC-SCNC: 10 MMOL/L (ref 8–16)
AST SERPL-CCNC: 12 U/L (ref 10–40)
BASOPHILS # BLD AUTO: 0.03 K/UL (ref 0–0.2)
BASOPHILS NFR BLD: 0.6 % (ref 0–1.9)
BILIRUB SERPL-MCNC: 0.5 MG/DL (ref 0.1–1)
BUN SERPL-MCNC: 34 MG/DL (ref 8–23)
CALCIUM SERPL-MCNC: 9.6 MG/DL (ref 8.7–10.5)
CHLORIDE SERPL-SCNC: 100 MMOL/L (ref 95–110)
CO2 SERPL-SCNC: 27 MMOL/L (ref 23–29)
CREAT SERPL-MCNC: 7.1 MG/DL (ref 0.5–1.4)
DIFFERENTIAL METHOD BLD: ABNORMAL
EOSINOPHIL # BLD AUTO: 0.6 K/UL (ref 0–0.5)
EOSINOPHIL NFR BLD: 11.3 % (ref 0–8)
ERYTHROCYTE [DISTWIDTH] IN BLOOD BY AUTOMATED COUNT: 17.9 % (ref 11.5–14.5)
EST. GFR  (NO RACE VARIABLE): 5.3 ML/MIN/1.73 M^2
GLUCOSE SERPL-MCNC: 78 MG/DL (ref 70–110)
HCT VFR BLD AUTO: 41.1 % (ref 37–48.5)
HGB BLD-MCNC: 12.3 G/DL (ref 12–16)
IMM GRANULOCYTES # BLD AUTO: 0.02 K/UL (ref 0–0.04)
IMM GRANULOCYTES NFR BLD AUTO: 0.4 % (ref 0–0.5)
LYMPHOCYTES # BLD AUTO: 1 K/UL (ref 1–4.8)
LYMPHOCYTES NFR BLD: 19.2 % (ref 18–48)
MAGNESIUM SERPL-MCNC: 2.2 MG/DL (ref 1.6–2.6)
MCH RBC QN AUTO: 25.9 PG (ref 27–31)
MCHC RBC AUTO-ENTMCNC: 29.9 G/DL (ref 32–36)
MCV RBC AUTO: 87 FL (ref 82–98)
MONOCYTES # BLD AUTO: 0.9 K/UL (ref 0.3–1)
MONOCYTES NFR BLD: 17 % (ref 4–15)
NEUTROPHILS # BLD AUTO: 2.8 K/UL (ref 1.8–7.7)
NEUTROPHILS NFR BLD: 51.5 % (ref 38–73)
NRBC BLD-RTO: 0 /100 WBC
PLATELET # BLD AUTO: 286 K/UL (ref 150–450)
PMV BLD AUTO: 10.1 FL (ref 9.2–12.9)
POTASSIUM SERPL-SCNC: 5.1 MMOL/L (ref 3.5–5.1)
PROT SERPL-MCNC: 7.7 G/DL (ref 6–8.4)
RBC # BLD AUTO: 4.74 M/UL (ref 4–5.4)
SODIUM SERPL-SCNC: 137 MMOL/L (ref 136–145)
WBC # BLD AUTO: 5.42 K/UL (ref 3.9–12.7)

## 2024-07-18 PROCEDURE — 83735 ASSAY OF MAGNESIUM: CPT | Performed by: NURSE PRACTITIONER

## 2024-07-18 PROCEDURE — 25000003 PHARM REV CODE 250

## 2024-07-18 PROCEDURE — 99233 SBSQ HOSP IP/OBS HIGH 50: CPT | Mod: ,,, | Performed by: GENERAL PRACTICE

## 2024-07-18 PROCEDURE — 80053 COMPREHEN METABOLIC PANEL: CPT | Performed by: NURSE PRACTITIONER

## 2024-07-18 PROCEDURE — 21400001 HC TELEMETRY ROOM

## 2024-07-18 PROCEDURE — 97535 SELF CARE MNGMENT TRAINING: CPT

## 2024-07-18 PROCEDURE — 97116 GAIT TRAINING THERAPY: CPT | Mod: CQ

## 2024-07-18 PROCEDURE — 97530 THERAPEUTIC ACTIVITIES: CPT

## 2024-07-18 PROCEDURE — 25000003 PHARM REV CODE 250: Performed by: NURSE PRACTITIONER

## 2024-07-18 PROCEDURE — 36415 COLL VENOUS BLD VENIPUNCTURE: CPT | Performed by: NURSE PRACTITIONER

## 2024-07-18 PROCEDURE — 85025 COMPLETE CBC W/AUTO DIFF WBC: CPT | Performed by: NURSE PRACTITIONER

## 2024-07-18 RX ORDER — MUPIROCIN 20 MG/G
OINTMENT TOPICAL 2 TIMES DAILY
Status: DISCONTINUED | OUTPATIENT
Start: 2024-07-18 | End: 2024-07-20 | Stop reason: HOSPADM

## 2024-07-18 RX ORDER — AMIODARONE HYDROCHLORIDE 100 MG/1
100 TABLET ORAL DAILY
Status: DISCONTINUED | OUTPATIENT
Start: 2024-07-19 | End: 2024-07-20 | Stop reason: HOSPADM

## 2024-07-18 RX ADMIN — APIXABAN 2.5 MG: 2.5 TABLET, FILM COATED ORAL at 09:07

## 2024-07-18 RX ADMIN — MUPIROCIN 1 G: 20 OINTMENT TOPICAL at 08:07

## 2024-07-18 RX ADMIN — APIXABAN 2.5 MG: 2.5 TABLET, FILM COATED ORAL at 08:07

## 2024-07-18 RX ADMIN — CALCIUM ACETATE 667 MG: 667 CAPSULE ORAL at 09:07

## 2024-07-18 RX ADMIN — CALCIUM ACETATE 667 MG: 667 CAPSULE ORAL at 11:07

## 2024-07-18 RX ADMIN — CALCIUM ACETATE 667 MG: 667 CAPSULE ORAL at 05:07

## 2024-07-18 NOTE — PLAN OF CARE
Problem: Adult Inpatient Plan of Care  Goal: Plan of Care Review  Outcome: Progressing  Goal: Patient-Specific Goal (Individualized)  Outcome: Progressing  Goal: Absence of Hospital-Acquired Illness or Injury  Outcome: Progressing  Goal: Optimal Comfort and Wellbeing  Outcome: Progressing  Goal: Readiness for Transition of Care  Outcome: Progressing     Problem: Hemodialysis  Goal: Safe, Effective Therapy Delivery  Outcome: Progressing  Goal: Effective Tissue Perfusion  Outcome: Progressing  Goal: Absence of Infection Signs and Symptoms  Outcome: Progressing     Problem: Wound  Goal: Optimal Coping  Outcome: Progressing  Goal: Optimal Functional Ability  Outcome: Progressing  Goal: Absence of Infection Signs and Symptoms  Outcome: Progressing  Goal: Improved Oral Intake  Outcome: Progressing  Goal: Optimal Pain Control and Function  Outcome: Progressing  Goal: Skin Health and Integrity  Outcome: Progressing  Goal: Optimal Wound Healing  Outcome: Progressing     Problem: Skin Injury Risk Increased  Goal: Skin Health and Integrity  Outcome: Progressing     Problem: Fall Injury Risk  Goal: Absence of Fall and Fall-Related Injury  Outcome: Progressing

## 2024-07-18 NOTE — ASSESSMENT & PLAN NOTE
Hemodynamically stable, alert   Cardiology consulted: Continue to hold metoprolol for now, restart low-dose amiodarone 100 mg daily - monitor heart rate closely.  Continuous telemetry, Continue Eliquis 2.5 mg BID for anticoagulation for PAF

## 2024-07-18 NOTE — PLAN OF CARE
Problem: Occupational Therapy  Goal: Occupational Therapy Goal  Description: Goals to be met by: 8/16/2024     Patient will increase functional independence with ADLs by performing:    UE Dressing with Stand-by Assistance.  LE Dressing with Stand-by Assistance.  Grooming while seated with Stand-by Assistance.  Toileting from toilet with Stand-by Assistance for hygiene and clothing management.   Toilet transfer to toilet with Stand-by Assistance.    Outcome: Progressing

## 2024-07-18 NOTE — PT/OT/SLP PROGRESS
Physical Therapy Treatment    Patient Name:  yTra Isaac   MRN:  [id number]    Recommendations:     Discharge Recommendations: Moderate Intensity Therapy (however, patient refusing placement and requests HHPT)  Discharge Equipment Recommendations: none  Barriers to discharge:   increased assist with mobility, decreased activity tolerance, tremulous, balance deficits    Assessment:     Tyra Isaac is a 84 y.o. female admitted with a medical diagnosis of Bradycardia.  She presents with the following impairments/functional limitations: weakness, impaired endurance, impaired self care skills, impaired functional mobility, gait instability, impaired balance, decreased lower extremity function, decreased safety awareness, impaired cardiopulmonary response to activity.    Pt up in chair with daughter present. Pt agreeable to visit. Pt required mod assist for sit to stand transfer with difficulty with force production through BLE requiring extra time to achieve a full stand.    Pt ambulated 40' with RW and min to mod assist for safety and RW management. Pt with extremely slow pacing requiring therapist to assist with advancing RW for pt's pacing to increase.     Pt returned to sitting up in chair. Alarm on and daughter present. Nurse informed.    Rehab Prognosis: Fair; patient would benefit from acute skilled PT services to address these deficits and reach maximum level of function.    Recent Surgery: * No surgery found *      Plan:     During this hospitalization, patient to be seen 5 x/week to address the identified rehab impairments via gait training, therapeutic activities, therapeutic exercises and progress toward the following goals:    Plan of Care Expires:  08/16/24    Subjective     Chief Complaint: her legs are weak  Patient/Family Comments/goals: to get better  Pain/Comfort:  Pain Rating 1: 0/10      Objective:     Communicated with nurse prior to session.  Patient found up in chair with chair check,  telemetry upon PT entry to room.     General Precautions: Standard, fall  Orthopedic Precautions: N/A  Braces: N/A  Respiratory Status: Room air     Functional Mobility:  Transfers:     Sit to Stand:  moderate assistance with rolling walker  Gait: x 40' with RW and min-modA       AM-PAC 6 CLICK MOBILITY          Treatment & Education:  Pt educated on importance of time OOB, importance of intermittent mobility, safe techniques for transfers/ambulation, discharge recommendations/options, and use of call light for assistance and fall prevention.        Patient left up in chair with all lines intact, call button in reach, chair alarm on, RN notified, and daughter present..    GOALS:   Multidisciplinary Problems       Physical Therapy Goals          Problem: Physical Therapy    Goal Priority Disciplines Outcome Goal Variances Interventions   Physical Therapy Goal     PT, PT/OT      Description: Goals to be met by: 24    Patient will increase functional independence with mobility by performin. Supine to sit with Supervision  2. Sit to stand transfer with Supervision  3. Bed to chair transfer with Supervision using Rolling Walker  4. Gait  x 100 feet with Supervision using Rolling Walker.   5. Lower extremity exercise program x20 reps per handout, with supervision                       Time Tracking:     PT Received On: 24  PT Start Time: 1127     PT Stop Time: 1137  PT Total Time (min): 10 min     Billable Minutes: Gait Training 10    Treatment Type: Treatment  PT/PTA: PTA     Number of PTA visits since last PT visit: 2     2024

## 2024-07-18 NOTE — PROGRESS NOTES
INPATIENT NEPHROLOGY progress  [facility]    Patient Name: Tyra Isaac  Date: 07/18/2024    Reason for consultation: ESRD    Chief Complaint:   Chief Complaint   Patient presents with    Chest Pain    Shortness of Breath       History of Present Illness:  83 y/o F with hx of ESRD on HD MWF and pAF who p/w dyspnea, no chest pain, no exac/reliev factors, due for HD today, consulted for dialysis.    Interval History:  7/15- HR 40s, -200, on RA, BNP 3800 (900 2 weeks ago), trop leak- seen on HD, attempting 2L UF; CXR  bibasilar opacities may relate to atelectasis, aspiration, or pneumonia.   7/16  still housed and seen in ER.  No complaints, just wants to be moved to a room.  2L off w/HD yesterday, no acute dialysis needs today.  VSS.  7/17  K+ high, to have HD today per pt's schedule.  BP elevated, will UF as tolerated.  Eating breakfast, no complaints.    7/18  s/p net 1119 cc UF yesterday.  Bp labile.  Bradycardia a little better    Plan of Care:    Assessment:  ESRD on HD MWF via right arm AVF  Hx of pAF/AF with RVR; Bradycardia   HTN emergency  SHPT  Anemia of CKD    Plan:    - ordered HD MWF  - cards eval pending- supposed to be on metop, amio, eliquis  - UF 2-3L- renal diet, 1.5L fluid restriction  - no acute binder needs  - no acute BRAYAN needs.  Add BRAYAN with hd if hg goes below 10  --renal diet  --renal dose medication for ESRD      Thank you for allowing us to participate in this patient's care. We will continue to follow.    Vital Signs:  Temp Readings from Last 3 Encounters:   07/18/24 98.2 °F (36.8 °C) (Oral)   07/06/24 98.6 °F (37 °C) (Oral)   06/03/24 98.5 °F (36.9 °C) (Oral)       Pulse Readings from Last 3 Encounters:   07/18/24 62   07/11/24 (!) 50   07/06/24 (!) 58       BP Readings from Last 3 Encounters:   07/18/24 104/64   07/11/24 (!) 138/54   07/06/24 (!) 190/77       Weight:  Wt Readings from Last 3 Encounters:   07/18/24 48.4 kg (106 lb 11.2 oz)   07/11/24 46.3  kg (102 lb)   06/28/24 48.7 kg (107 lb 5.8 oz)       Past Medical & Surgical History:  Past Medical History:   Diagnosis Date    A-fib     Anxiety     Depression     Disorder of kidney and ureter     Encephalopathy acute 1/1/2018    End stage kidney disease 6/17/2017    Gout     Hyperlipidemia     Hypertension     Moderate episode of recurrent major depressive disorder 1/17/2018    Nephropathy hypertensive, 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    Osteopenia of multiple sites 3/9/2018    Based upon bone density measurements. Patient also has chronic kidney disease.    Stroke 11/2016       Past Surgical History:   Procedure Laterality Date    ANGIOGRAM, CORONARY, WITH LEFT HEART CATHETERIZATION N/A 2/7/2022    Procedure: Angiogram, Coronary, with Left Heart Cath;  Surgeon: Gino Leal MD;  Location: [facility] CATH/EP LAB;  Service: Cardiology;  Laterality: N/A;    CARDIAC SURGERY      stents    EYE SURGERY      WRIST SURGERY         Past Social History:  Social History     Socioeconomic History    Marital status:      Spouse name: Aria Isaac    Number of children: 3   Occupational History    Occupation: Not working   Tobacco Use    Smoking status: Never    Smokeless tobacco: Never   Substance and Sexual Activity    Alcohol use: No    Drug use: No    Sexual activity: Not Currently     Social Determinants of Health     Financial Resource Strain: Medium Risk (7/11/2024)    Overall Financial Resource Strain (CARDIA)     Difficulty of Paying Living Expenses: Somewhat hard   Food Insecurity: No Food Insecurity (7/11/2024)    Hunger Vital Sign     Worried About Running Out of Food in the Last Year: Never true     Ran Out of Food in the Last Year: Never true   Transportation Needs: No Transportation Needs (7/11/2024)    TRANSPORTATION NEEDS     Transportation : No   Physical  Activity: Inactive (6/15/2022)    Exercise Vital Sign     Days of Exercise per Week: 0 days     Minutes of Exercise per Session: 0 min   Stress: Stress Concern Present (7/11/2024)    [employer] [place] [employer] - Occupational Stress Questionnaire     Feeling of Stress : To some extent   Housing Stability: Patient Declined (6/30/2024)    Housing Stability Vital Sign     Unable to Pay for Housing in the Last Year: Patient declined     Homeless in the Last Year: Patient declined       Medications:  Scheduled Meds:   apixaban  2.5 mg Oral BID    calcium acetate(phosphat bind)  667 mg Oral TID WM    epoetin rosy-ebpx (RETACRIT) injection  100 Units/kg Intravenous Every Mon, Wed, Fri     Continuous Infusions:  PRN Meds:.  Current Facility-Administered Medications:     acetaminophen, 650 mg, Oral, Q8H PRN    acetaminophen, 650 mg, Oral, Q4H PRN    aluminum-magnesium hydroxide-simethicone, 30 mL, Oral, QID PRN    carboxymethylcellulose, 1 drop, Both Eyes, TID PRN    dextrose 50%, 12.5 g, Intravenous, PRN    dextrose 50%, 25 g, Intravenous, PRN    glucagon (human recombinant), 1 mg, Intramuscular, PRN    glucose, 16 g, Oral, PRN    glucose, 24 g, Oral, PRN    guaiFENesin 100 mg/5 ml, 200 mg, Oral, Q6H PRN    hydrALAZINE, 10 mg, Intravenous, Q6H PRN    magnesium oxide, 800 mg, Oral, PRN    magnesium oxide, 800 mg, Oral, PRN    melatonin, 6 mg, Oral, Nightly PRN    naloxone, 0.02 mg, Intravenous, PRN    ondansetron, 4 mg, Intravenous, Q6H PRN    potassium bicarbonate, 35 mEq, Oral, PRN    potassium bicarbonate, 50 mEq, Oral, PRN    potassium bicarbonate, 60 mEq, Oral, PRN    potassium, sodium phosphates, 2 packet, Oral, PRN    potassium, sodium phosphates, 2 packet, Oral, PRN    potassium, sodium phosphates, 2 packet, Oral, PRN    senna-docusate 8.6-50 mg, 1 tablet, Oral, Daily PRN    sodium chloride 0.9%, 3 mL, Intravenous, Q12H PRN  No current facility-administered medications on file prior to encounter.      Current Outpatient Medications on File Prior to Encounter   Medication Sig Dispense Refill    amiodarone (PACERONE) 200 MG Tab Take 1 tablet (200 mg total) by mouth 3 (three) times daily for 14 days, THEN 1 tablet (200 mg total) 2 (two) times daily for 7 days. 56 tablet 0    atorvastatin (LIPITOR) 40 MG tablet Take 40 mg by mouth once daily.      b complex vitamins tablet Take 1 tablet by mouth once daily.      dorzolamide-timolol 2-0.5% (COSOPT) 22.3-6.8 mg/mL ophthalmic solution Place 1 drop into both eyes 2 (two) times daily.      ELIQUIS 2.5 mg Tab Take 1 tablet (2.5 mg total) by mouth 2 (two) times daily. 60 tablet 0    latanoprost 0.005 % ophthalmic solution Place 1 drop into both eyes every evening.      metoclopramide HCl (REGLAN) 5 MG tablet Take 1 tablet (5 mg total) by mouth 3 (three) times daily before meals. 30 tablet 1    metoprolol tartrate (LOPRESSOR) 25 MG tablet Take 0.5 tablets (12.5 mg total) by mouth 2 (two) times daily. 30 tablet 0    midodrine (PROAMATINE) 10 MG tablet Take 1 tablet (10 mg total) by mouth 3 (three) times daily with meals. 270 tablet 0    ondansetron (ZOFRAN-ODT) 4 MG TbDL Take 1 tablet (4 mg total) by mouth every 6 (six) hours as needed (nausea). 30 tablet 5    loperamide (IMODIUM A-D) 2 mg Tab Take 1 tablet (2 mg total) by mouth 4 (four) times daily as needed (diarrhea). (Patient not taking: Reported on 7/15/2024) 3 tablet 0       Allergies:  Cyclobenzaprine, Fish containing products, Peanut, and Tramadol    Past Family History:  Reviewed; refer to Hospitalist Admission Note    Review of Systems:  Review of Systems - All 14 systems reviewed and negative, except as noted in HPI    Physical Exam:  General Appearance:    Lethargic, AAO x 3, cooperative, appears stated age   Head:    Normocephalic, atraumatic   Eyes:    PER, EOMI, and conjunctiva/sclera clear bilaterally       Mouth:   Moist mucus membranes, no thrush or oral lesions,       normal dentition   Back:     No CVA  tenderness   Lungs:     Coarse   Chest wall:    No tenderness or deformity   Heart:    Regular rate and rhythm, S1 and S2 normal, no murmur, rub   or gallop   Abdomen:     Soft, non-tender, non-distended, bowel sounds active all four   quadrants, no RT or guarding, no masses, no organomegaly   Extremities:   Warm and well perfused, distal pulses are intact, no             cyanosis or peripheral edema   MSK:   No joint or muscle swelling, tenderness or deformity   Skin:   Skin color, texture, turgor normal, no rashes or lesions   Neurologic/Psychiatric:   CNII-XII intact     Results:  Lab Results   Component Value Date     07/18/2024    K 5.1 07/18/2024     07/18/2024    CO2 27 07/18/2024    BUN 34 (H) 07/18/2024    CREATININE 7.1 (H) 07/18/2024    CALCIUM 9.6 07/18/2024    ANIONGAP 10 07/18/2024    ESTGFRAFRICA 4.7 (A) 02/08/2022    EGFRNONAA 4.0 (A) 02/08/2022       Lab Results   Component Value Date    CALCIUM 9.6 07/18/2024    PHOS 4.6 (H) 06/30/2024       Recent Labs   Lab 07/18/24  0351   WBC 5.42   RBC 4.74   HGB 12.3   HCT 41.1      MCV 87   MCH 25.9*   MCHC 29.9*     Imaging Results              X-Ray Chest AP Portable (Final result)  Result time 07/15/24 06:54:18      Final result by Phillip Magaña MD (07/15/24 06:54:18)                   Impression:      Ill-defined bibasilar opacities may relate to atelectasis, aspiration, or pneumonia.      Electronically signed by: Phillip Magaña  Date:    07/15/2024  Time:    06:54               Narrative:    EXAMINATION:  XR CHEST AP PORTABLE    CLINICAL HISTORY:  Chest Pain;    TECHNIQUE:  Single frontal view of the chest was performed.    COMPARISON:  Chest radiograph performed 07/01/2024, 13:12 hours.    FINDINGS:  Monitoring leads overlie the chest.  Grossly unchanged cardiomediastinal contours.    Chronic interstitial coarsening is noted.    Ill-defined bibasilar opacities.    No definite pneumothorax or large volume pleural effusion.  No  acute findings in the visualized abdomen.    Osseous and soft tissue structures appear without definite acute change.                                    Patient care was time spent personally by me on the following activities:   Obtaining a history  Examination of patient.  Providing medical care at the patients bedside.  Developing a treatment plan with patient or surrogate and bedside caregivers  Ordering and reviewing laboratory studies, radiographic studies, pulse oximetry.  Ordering and performing treatments and interventions.  Evaluation of patient's response to treatment.  Discussions with consultants while on the unit and immediately available to the patient.  Re-evaluation of the patient's condition.  Documentation in the medical record.       Jimenez Valero MD    [facility]  [address] [place] [id number]  [phone] (p)  [phone] (f)

## 2024-07-18 NOTE — PROGRESS NOTES
[facility]  Progress Note    Patient Name: Tyra Isaac  MRN: [id number]  Patient Class: IP- Inpatient   Admission Date: 7/15/2024  Length of Stay: 2 days  Attending Physician: German Michael DO  Primary Care Provider: Kalpesh Goel MD        Subjective:     Principal Problem:Bradycardia        HPI:  84 year old female with PMH significant for ESRD on HD MWF, A-fib on Eliquis, HTN, CVA presented to the ER with chief complaint of shortness of breath and weakness.  Patient reports that her last HD session was this past Friday 7/12/24.  Over the weekend, she began to feel progressively more short of breath with associated generalized weakness and fatigue.  She woke up short of breath this morning which prompted ER evaluation rather than going to her HD appointment.  Patient denies chest pain, palpitations, abdominal pain, vomiting.  Does endorse mild dizziness and nausea.  Denies syncope.  In the ER, patient found to be markedly bradycardic with HR 30-40s.  BP stable.  Mentation at baseline.  Troponin mildly elevated at 32.7 with repeat 31.8.  BNP elevated.  Electrolytes stable.  Patient was recently admitted to the hospital and went into SVT/afib RVR during dialysis; cardiology was consulted and she had a negative stress test and was discharged on amiodarone and metoprolol.  Will admit patient for further evaluation and treatment.     Overview/Hospital Course:  Patient monitored closely during hospitalization.  She was admitted for weakness and bradycardia.  Cardiology was consulted.  Her home amiodarone and metoprolol were held.  She was monitored on continuous telemetry.  Nephrology was consulted for her dialysis management.    Cardiology consulted: Continue to hold metoprolol for now, restart low-dose amiodarone 100 mg daily - monitor heart rate closely.  Continuous telemetry, Continue Eliquis 2.5 mg BID for anticoagulation for PAF  Recommend stop eyedrops with timolol and  switch to non beta-blocker eyedrop. PT/OT was consulted and recommended SNF.      Interval History: Patient seen in ER.  NAD.  Reports continued generalized weakness.  Denies chest pain.  Mild shortness of breath and cough reported.  Tolerated HD yesterday with 2L removed.   Remains bradycardic.  Continue to monitor and hold amio/betablocker.  Cardiology following.   PT/OT consulted     Review of Systems   Constitutional:  Positive for fatigue.   Respiratory:  Negative for shortness of breath.    Cardiovascular:  Negative for chest pain and palpitations.   Gastrointestinal:  Negative for abdominal pain and nausea.     Objective:     Vital Signs (Most Recent):  Temp: 98.2 °F (36.8 °C) (07/18/24 1523)  Pulse: 75 (07/18/24 1523)  Resp: 20 (07/18/24 1523)  BP: 129/61 (07/18/24 1523)  SpO2: 97 % (07/18/24 1523) Vital Signs (24h Range):  Temp:  [97.4 °F (36.3 °C)-98.8 °F (37.1 °C)] 98.2 °F (36.8 °C)  Pulse:  [60-75] 75  Resp:  [16-20] 20  SpO2:  [95 %-99 %] 97 %  BP: ()/(40-70) 129/61     Weight: 48.4 kg (106 lb 11.2 oz)  Body mass index is 17.76 kg/m².    Intake/Output Summary (Last 24 hours) at 7/18/2024 1540  Last data filed at 7/18/2024 1523  Gross per 24 hour   Intake 1200 ml   Output 1719 ml   Net -519 ml         Physical Exam  Vitals and nursing note reviewed.   Constitutional:       General: She is not in acute distress.  Cardiovascular:      Rate and Rhythm: Normal rate and regular rhythm.   Pulmonary:      Effort: Pulmonary effort is normal. No respiratory distress.      Breath sounds: Normal breath sounds.   Abdominal:      Palpations: Abdomen is soft.      Tenderness: There is no abdominal tenderness.   Musculoskeletal:      Right lower leg: No edema.      Left lower leg: No edema.   Skin:     General: Skin is warm and dry.   Neurological:      Mental Status: She is alert and oriented to person, place, and time.             Significant Labs: All pertinent labs within the past 24 hours have been  reviewed.  CBC:   Recent Labs   Lab 07/17/24  0350 07/18/24  0351   WBC 5.92 5.42   HGB 11.4* 12.3   HCT 40.2 41.1    286     CMP:   Recent Labs   Lab 07/17/24  0350 07/18/24  0351    137   K 5.8* 5.1    100   CO2 20* 27   GLU 93 78   BUN 49* 34*   CREATININE 8.6* 7.1*   CALCIUM 9.3 9.6   PROT 6.9 7.7   ALBUMIN 3.7 4.0   BILITOT 0.2 0.5   ALKPHOS 75 80   AST 16 12   ALT 19 16   ANIONGAP 13 10     Magnesium:   Recent Labs   Lab 07/17/24  0350 07/18/24  0351   MG 2.5 2.2       Significant Imaging: I have reviewed all pertinent imaging results/findings within the past 24 hours.    Assessment/Plan:      * Bradycardia  Hemodynamically stable, alert   Cardiology consulted: Continue to hold metoprolol for now, restart low-dose amiodarone 100 mg daily - monitor heart rate closely.  Continuous telemetry, Continue Eliquis 2.5 mg BID for anticoagulation for PAF      CAD s/p PCI  Patient with known CAD s/p stent placement, which is controlled Will continue Statin and monitor for S/Sx of angina/ACS. Continue to monitor on telemetry.     Paroxysmal atrial fibrillation  Patient with atrial fibrillation which is controlled currently with Beta Blocker and Amiodarone. Patient is currently in sinus bradycardia.[id number] Score: 4. Anticoagulation indicated. Anticoagulation done with renally dosed Eliquis .  Holding home amiodarone/beta blocker at this time due to bradycardia   Cardiology consulted     End stage renal disease on dialysis  Creatine stable for now. BMP reviewed- noted Estimated Creatinine Clearance: 4.5 mL/min (A) (based on SCr of 6.8 mg/dL (H)). according to latest data. Based on current GFR, CKD stage is end stage.  Monitor UOP and serial BMP and adjust therapy as needed. Renally dose meds. Avoid nephrotoxic medications and procedures.  Nephrology consulted for hemodialysis management       VTE Risk Mitigation (From admission, onward)           Ordered     apixaban tablet 2.5 mg  2 times daily          07/16/24 0723     IP VTE HIGH RISK PATIENT  Once         07/15/24 0813     Place sequential compression device  Until discontinued         07/15/24 0813                    Discharge Planning   ILAN: 7/19/2024     Code Status: DNR   Is the patient medically ready for discharge?:     Reason for patient still in hospital (select all that apply): Treatment, Consult recommendations, PT / OT recommendations, and Pending disposition  Discharge Plan A: Skilled Nursing Facility                  German Michael DO  Department of Hospital Medicine   [facility]

## 2024-07-18 NOTE — PLAN OF CARE
SW sent referrals for SNF placement (not to [facility]) to all other facilities in a 30 mile radius.       07/18/24 1309   Post-Acute Status   Post-Acute Authorization Placement   Post-Acute Placement Status Referrals Sent   Discharge Plan   Discharge Plan A Skilled Nursing Facility

## 2024-07-18 NOTE — NURSING
Nurses Note -- 4 Eyes      7/18/2024   2:01 AM      Skin assessed during: Q Shift Change      [x] No Altered Skin Integrity Present    []Prevention Measures Documented      [] Yes- Altered Skin Integrity Present or Discovered   [] LDA Added if Not in Epic (Describe Wound)   [] New Altered Skin Integrity was Present on Admit and Documented in LDA   [] Wound Image Taken    Wound Care Consulted? No    Attending Nurse:  Kemi Henao RN/Staff Member:   [id number]

## 2024-07-18 NOTE — PLAN OF CARE
Problem: Adult Inpatient Plan of Care  Goal: Plan of Care Review  Outcome: Progressing  Goal: Patient-Specific Goal (Individualized)  Outcome: Progressing  Goal: Absence of Hospital-Acquired Illness or Injury  Outcome: Progressing  Goal: Optimal Comfort and Wellbeing  Outcome: Progressing  Goal: Readiness for Transition of Care  Outcome: Progressing     Problem: Hemodialysis  Goal: Safe, Effective Therapy Delivery  Outcome: Progressing  Goal: Effective Tissue Perfusion  Outcome: Progressing  Goal: Absence of Infection Signs and Symptoms  Outcome: Progressing     Problem: Wound  Goal: Optimal Coping  Outcome: Progressing  Goal: Optimal Functional Ability  Outcome: Progressing  Goal: Absence of Infection Signs and Symptoms  Outcome: Progressing  Goal: Improved Oral Intake  Outcome: Progressing  Goal: Optimal Pain Control and Function  Outcome: Progressing  Goal: Skin Health and Integrity  Outcome: Progressing  Goal: Optimal Wound Healing  Outcome: Progressing     Problem: Skin Injury Risk Increased  Goal: Skin Health and Integrity  Outcome: Progressing

## 2024-07-18 NOTE — PT/OT/SLP PROGRESS
Occupational Therapy   Treatment    Name: Tyra Isaac  MRN: [id number]  Admitting Diagnosis:  Bradycardia       Recommendations:     Discharge Recommendations: Low Intensity Therapy  Discharge Equipment Recommendations:  none  Barriers to discharge:  None    Assessment:     Tyra Isaac is a 84 y.o. female with a medical diagnosis of Bradycardia.  She presents with general weakness. Patient participated in bed mobility, unsupported sitting EOB, LB dressing sitting EOB and bed/chair transfer using rolling walker. Performance deficits affecting function are weakness, impaired endurance, impaired self care skills, impaired functional mobility, gait instability, impaired balance, decreased safety awareness, decreased lower extremity function, decreased upper extremity function.     Rehab Prognosis:  Fair; patient would benefit from acute skilled OT services to address these deficits and reach maximum level of function.       Plan:     Patient to be seen 5 x/week to address the above listed problems via self-care/home management, therapeutic activities, therapeutic exercises  Plan of Care Expires: 08/16/24  Plan of Care Reviewed with: patient    Subjective     Chief Complaint: General weakness  Patient/Family Comments/goals: Improved functional mobility and ADL independence.  Pain/Comfort:  Pain Rating 1: 0/10  Pain Rating Post-Intervention 1: 0/10    Objective:     Communicated with: nurse prior to session.  Patient found HOB elevated with telemetry, peripheral IV upon OT entry to room.    General Precautions: Standard, fall    Orthopedic Precautions:N/A  Braces: N/A  Respiratory Status: Room air     Occupational Performance:     Bed Mobility:    Patient completed Scooting/Bridging with moderate assistance  Patient completed Supine to Sit with moderate assistance   Performed unsupported sitting EOB with contact guard assistance.    Functional Mobility/Transfers:  Patient completed Bed <> Chair Transfer using  Step Transfer technique with moderate assistance with rolling walker  Functional Mobility: ambulated 5 feet using rolling walker with moderate assistance.    Activities of Daily Living:  Lower Body Dressing: maximal assistance to don/doff socks sitting EOB.      [facility] 6 Click ADL: 15    Treatment & Education:  Patient instructed to sit up in chair for at least 2 hours.    Patient left up in chair with all lines intact, call button in reach, and chair alarm on    GOALS:   Multidisciplinary Problems       Occupational Therapy Goals          Problem: Occupational Therapy    Goal Priority Disciplines Outcome Interventions   Occupational Therapy Goal     OT, PT/OT Progressing    Description: Goals to be met by: 8/16/2024     Patient will increase functional independence with ADLs by performing:    UE Dressing with Stand-by Assistance.  LE Dressing with Stand-by Assistance.  Grooming while seated with Stand-by Assistance.  Toileting from toilet with Stand-by Assistance for hygiene and clothing management.   Toilet transfer to toilet with Stand-by Assistance.                         Time Tracking:     OT Date of Treatment: 07/18/24  OT Start Time: 1042  OT Stop Time: 1106  OT Total Time (min): 24 min    Billable Minutes:Self Care/Home Management 12  Therapeutic Activity 12    OT/ANSHUL: OT          7/18/2024

## 2024-07-18 NOTE — PLAN OF CARE
Problem: Physical Therapy  Goal: Physical Therapy Goal  Description: Goals to be met by: 24    Patient will increase functional independence with mobility by performin. Supine to sit with Supervision  2. Sit to stand transfer with Supervision  3. Bed to chair transfer with Supervision using Rolling Walker  4. Gait  x 100 feet with Supervision using Rolling Walker.   5. Lower extremity exercise program x20 reps per handout, with supervision  Outcome: Progressing

## 2024-07-18 NOTE — PROGRESS NOTES
"[facility]  Department of Cardiology  Progress Note      PATIENT NAME: Tyra Isaac  MRN: [id number]  TODAY'S DATE: 07/18/2024  ADMIT DATE: 7/15/2024                          CONSULT REQUESTED BY: German Michael DO    SUBJECTIVE     PRINCIPAL PROBLEM: Bradycardia      REASON FOR CONSULT:  Bradycardia    Interval history:  07/18/2024  Pt seen resting in bed this morning. She states that she would like to go home if possible. Telemetry reviewed - remains in NSR overnight rates 50s-60s.     7/17/24  Resting in bed, complaints of weakness, feeling shaky  Awaiting dialysis today  Denies chest pain or dyspnea  Sinus rhythm, heart rate 60 beats minute on telemetry    7/16/24  Resting hallway bed, fatigued, heart rate 58 beats per minute.  BP stable    HPI:  Patient is an 84-year-old  female who presented to the ER with complaints of chest pain and shortness of breath.  She states I could not breathe and had tightness in my chest."She has also been coughing and recently medicated with cough syrup.  She was anxious to get to dialysis today as she has not had it since Friday.  She has end-stage renal disease on hemodialysis Monday Wednesday Friday and sees Dr. Valero.  Patient's daughter is present and states a new medication that she was started on at last hospitalization did not agree well with her.  Hospital discharge as below notes discharging her on amiodarone taper but there are also notes that she was on Multaq at home as well.  She was also on Lopressor 12.5 mg b.i.d. at home.  Patient's heart rate on arrival to the emergency room was in the 30s-40s with prolonged QT.    Patient recently discharged from hospital  Hospital Course from 06/27/2024-07/06/2024:   "Patient was admitted with chest pain and hyperkalemia.  Her EKG was nonischemic and her troponins were noted to be elevated, which is baseline for her 2/2 ESRD. She remained chest pain free during her stay.  Nephrology was " "consulted for routine dialysis.  She was maintained on telemetry and cardiology was consulted.  She received medications to shift her potassium and dialysis was ordered.  Her dialysis was cut short due to syncopal episode with hypotension.  She was monitored overnight.  She went for stress testing, which was negative for reversible ischemia.  She had another episode of severe tachycardia after dialysis and needed Lopressor and Cardizem IV and moved to ICU.  On 7/1 during dialysis patient became tachycardic and hypotensive in afib RVR. She was cardioverted and started on amio drip and phenylephrine drip.  Palliative Care was consulted, patient wishes to remain full code at this time until she discusses it further with her family.  Patient's heart rate and BP remained stable.  The amiodarone drip was transitioned to PO and patient was transferred out of ICU on 7/4/24.  Patient had hemodialysis 7/5/24 and tolerated this well.  PT/OT was consulted who recommended home health which was ordered.  Patient seen and examined on day of discharge and deemed stable for discharge home with home health.  She will need to follow up with her PCP, Cardiologist, and Nephrologist in 1 week.  Patient agreeable with plan and eager for discharge home. "        Review of patient's allergies indicates:   Allergen Reactions    Cyclobenzaprine     Fish containing products Hives    Peanut Other (See Comments)    Tramadol Itching       Past Medical History:   Diagnosis Date    A-fib     Anxiety     Depression     Disorder of kidney and ureter     Encephalopathy acute 1/1/2018    End stage kidney disease 6/17/2017    Gout     Hyperlipidemia     Hypertension     Moderate episode of recurrent major depressive disorder 1/17/2018    Nephropathy hypertensive, 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    Osteopenia of " multiple sites 3/9/2018    Based upon bone density measurements. Patient also has chronic kidney disease.    Stroke 11/2016     Past Surgical History:   Procedure Laterality Date    ANGIOGRAM, CORONARY, WITH LEFT HEART CATHETERIZATION N/A 2/7/2022    Procedure: Angiogram, Coronary, with Left Heart Cath;  Surgeon: Gino Leal MD;  Location: [facility] CATH/EP LAB;  Service: Cardiology;  Laterality: N/A;    CARDIAC SURGERY      stents    EYE SURGERY      WRIST SURGERY       Social History     Tobacco Use    Smoking status: Never    Smokeless tobacco: Never   Substance Use Topics    Alcohol use: No    Drug use: No        REVIEW OF SYSTEMS  Negative except as mentioned in HPI    OBJECTIVE     VITAL SIGNS (Most Recent)  Temp: 98.2 °F (36.8 °C) (07/18/24 1106)  Pulse: 64 (07/18/24 1106)  Resp: 20 (07/18/24 1106)  BP: (!) 157/70 (07/18/24 1106)  SpO2: 95 % (07/18/24 1106)    VENTILATION STATUS  Resp: 20 (07/18/24 1106)  SpO2: 95 % (07/18/24 1106)           I & O (Last 24H):  Intake/Output Summary (Last 24 hours) at 7/18/2024 1134  Last data filed at 7/18/2024 0929  Gross per 24 hour   Intake 840 ml   Output 1719 ml   Net -879 ml       WEIGHTS  Wt Readings from Last 3 Encounters:   07/18/24 0343 48.4 kg (106 lb 11.2 oz)   07/16/24 1951 46.7 kg (102 lb 15.3 oz)   07/15/24 0542 46.3 kg (102 lb)   07/11/24 1030 46.3 kg (102 lb)   06/28/24 0300 48.7 kg (107 lb 5.8 oz)   06/27/24 2343 47.6 kg (105 lb)       PHYSICAL EXAM    GENERAL:  Elderly fatigued thin female resting in bed in no apparent distress.  HEENT: Normocephalic.    NECK: No JVD.   CARDIAC: Regular  rate and rhythm.  + murmur  CHEST ANATOMY: normal.   LUNGS:  Diminished at bases   ABDOMEN: Soft .  Normal bowel sounds.    EXTREMITIES:  Right arm fistula for dialysis in place.    CENTRAL NERVOUS SYSTEM: AAO x 3  SKIN: No rash     HOME MEDICATIONS:  No current facility-administered medications on file prior to encounter.     Current Outpatient Medications on File Prior  to Encounter   Medication Sig Dispense Refill    amiodarone (PACERONE) 200 MG Tab Take 1 tablet (200 mg total) by mouth 3 (three) times daily for 14 days, THEN 1 tablet (200 mg total) 2 (two) times daily for 7 days. 56 tablet 0    atorvastatin (LIPITOR) 40 MG tablet Take 40 mg by mouth once daily.      b complex vitamins tablet Take 1 tablet by mouth once daily.      dorzolamide-timolol 2-0.5% (COSOPT) 22.3-6.8 mg/mL ophthalmic solution Place 1 drop into both eyes 2 (two) times daily.      ELIQUIS 2.5 mg Tab Take 1 tablet (2.5 mg total) by mouth 2 (two) times daily. 60 tablet 0    latanoprost 0.005 % ophthalmic solution Place 1 drop into both eyes every evening.      metoclopramide HCl (REGLAN) 5 MG tablet Take 1 tablet (5 mg total) by mouth 3 (three) times daily before meals. 30 tablet 1    metoprolol tartrate (LOPRESSOR) 25 MG tablet Take 0.5 tablets (12.5 mg total) by mouth 2 (two) times daily. 30 tablet 0    midodrine (PROAMATINE) 10 MG tablet Take 1 tablet (10 mg total) by mouth 3 (three) times daily with meals. 270 tablet 0    ondansetron (ZOFRAN-ODT) 4 MG TbDL Take 1 tablet (4 mg total) by mouth every 6 (six) hours as needed (nausea). 30 tablet 5    loperamide (IMODIUM A-D) 2 mg Tab Take 1 tablet (2 mg total) by mouth 4 (four) times daily as needed (diarrhea). (Patient not taking: Reported on 7/15/2024) 3 tablet 0       SCHEDULED MEDS:   apixaban  2.5 mg Oral BID    calcium acetate(phosphat bind)  667 mg Oral TID WM    epoetin rosy-ebpx (RETACRIT) injection  100 Units/kg Intravenous Every Mon, Wed, Fri       CONTINUOUS INFUSIONS:    PRN MEDS:  Current Facility-Administered Medications:     acetaminophen, 650 mg, Oral, Q8H PRN    acetaminophen, 650 mg, Oral, Q4H PRN    aluminum-magnesium hydroxide-simethicone, 30 mL, Oral, QID PRN    carboxymethylcellulose, 1 drop, Both Eyes, TID PRN    dextrose 50%, 12.5 g, Intravenous, PRN    dextrose 50%, 25 g, Intravenous, PRN    glucagon (human recombinant), 1 mg,  Intramuscular, PRN    glucose, 16 g, Oral, PRN    glucose, 24 g, Oral, PRN    guaiFENesin 100 mg/5 ml, 200 mg, Oral, Q6H PRN    hydrALAZINE, 10 mg, Intravenous, Q6H PRN    magnesium oxide, 800 mg, Oral, PRN    magnesium oxide, 800 mg, Oral, PRN    melatonin, 6 mg, Oral, Nightly PRN    naloxone, 0.02 mg, Intravenous, PRN    ondansetron, 4 mg, Intravenous, Q6H PRN    potassium bicarbonate, 35 mEq, Oral, PRN    potassium bicarbonate, 50 mEq, Oral, PRN    potassium bicarbonate, 60 mEq, Oral, PRN    potassium, sodium phosphates, 2 packet, Oral, PRN    potassium, sodium phosphates, 2 packet, Oral, PRN    potassium, sodium phosphates, 2 packet, Oral, PRN    senna-docusate 8.6-50 mg, 1 tablet, Oral, Daily PRN    sodium chloride 0.9%, 3 mL, Intravenous, Q12H PRN    LABS AND DIAGNOSTICS     CBC LAST 3 DAYS  Recent Labs   Lab 07/16/24 0420 07/17/24  0350 07/18/24  0351   WBC 6.24 5.92 5.42   RBC 4.07 4.40 4.74   HGB 10.6* 11.4* 12.3   HCT 36.3* 40.2 41.1   MCV 89 91 87   MCH 26.0* 25.9* 25.9*   MCHC 29.2* 28.4* 29.9*   RDW 17.2* 18.1* 17.9*    253 286   MPV 10.1 9.8 10.1   GRAN 57.4  3.6 47.6  2.8 51.5  2.8   LYMPH 16.8*  1.1 22.3  1.3 19.2  1.0   MONO 15.9*  1.0 20.6*  1.2* 17.0*  0.9   BASO 0.04 0.02 0.03   NRBC 0 0 0       COAGULATION LAST 3 DAYS  Recent Labs   Lab 07/15/24  0559   INR 1.2   APTT 34.2*       CHEMISTRY LAST 3 DAYS  Recent Labs   Lab 07/15/24  0621 07/16/24  0420 07/17/24  0350 07/18/24  0351   NA  --  138 137 137   K  --  5.0 5.8* 5.1   CL  --  104 104 100   CO2  --  23 20* 27   ANIONGAP  --  11 13 10   BUN  --  34* 49* 34*   CREATININE  --  6.8* 8.6* 7.1*   GLU  --  75 93 78   CALCIUM  --  9.0 9.3 9.6   PH 7.345*  --   --   --    MG  --  2.2 2.5 2.2   ALBUMIN  --  3.6 3.7 4.0   PROT  --  6.9 6.9 7.7   ALKPHOS  --  75 75 80   ALT  --  25 19 16   AST  --  17 16 12   BILITOT  --  0.5 0.2 0.5       CARDIAC PROFILE LAST 3 DAYS  Recent Labs   Lab 07/15/24  0559   BNP 3,875*       ENDOCRINE LAST 3  "DAYS  No results for input(s): "TSH", "PROCAL" in the last 168 hours.    LAST ARTERIAL BLOOD GAS  ABG  Recent Labs   Lab 07/15/24  0621   PH 7.345*   PO2 25*   PCO2 58.3*   HCO3 31.8*   BE 6*       LAST 7 DAYS MICROBIOLOGY   Microbiology Results (last 7 days)       ** No results found for the last 168 hours. **            MOST RECENT IMAGING  X-Ray Chest AP Portable  Narrative: EXAMINATION:  XR CHEST AP PORTABLE    CLINICAL HISTORY:  Chest Pain;    TECHNIQUE:  Single frontal view of the chest was performed.    COMPARISON:  Chest radiograph performed 07/01/2024, 13:12 hours.    FINDINGS:  Monitoring leads overlie the chest.  Grossly unchanged cardiomediastinal contours.    Chronic interstitial coarsening is noted.    Ill-defined bibasilar opacities.    No definite pneumothorax or large volume pleural effusion.  No acute findings in the visualized abdomen.    Osseous and soft tissue structures appear without definite acute change.  Impression: Ill-defined bibasilar opacities may relate to atelectasis, aspiration, or pneumonia.    Electronically signed by: Phillip Magaña  Date:    07/15/2024  Time:    06:54      ECHOCARDIOGRAM RESULTS (last 5)  Results for orders placed during the hospital encounter of 06/27/24    Echo    Interpretation Summary    Left Ventricle: The left ventricle is normal in size. Normal wall thickness. There is concentric remodeling. There is normal systolic function with a visually estimated ejection fraction of 60 - 65%. Biplane (2D) method of discs ejection fraction is 64%.    Right Ventricle: Normal right ventricular cavity size. Wall thickness is normal. Systolic function is normal.    Left Atrium: Left atrium is mildly dilated.    Aortic Valve: The aortic valve is a trileaflet valve. Mildly calcified left, right and noncoronary cusps. Mildly restricted motion. There is mild stenosis. Aortic valve area by VTI is 1.80 cm². Aortic valve peak velocity is 1.22 m/s. Mean gradient is 3 mmHg. The " dimensionless index is 0.79.    Mitral Valve: There is moderate mitral annular calcification present. There is severe stenosis. The mean pressure gradient across the mitral valve is 9 mmHg at a heart rate of  bpm. There is mild regurgitation with a centrally directed jet.      Results for orders placed during the hospital encounter of 01/04/24    Echo    Interpretation Summary    Left Ventricle: The left ventricle is normal in size. Normal wall thickness. Normal wall motion. There is normal systolic function with a visually estimated ejection fraction of 60 - 65%. There is diastolic dysfunction.    Right Ventricle: Normal right ventricular cavity size. Wall thickness is normal. Right ventricle wall motion  is normal. Systolic function is normal.    Left Atrium: Left atrium is severely dilated.    Mitral Valve: There is moderate bileaflet sclerosis. There is moderate mitral annular calcification present. There is mild to moderate regurgitation with a centrally directed jet.    Tricuspid Valve: There is mild to moderate regurgitation.    Pulmonary Artery: There is mild pulmonary hypertension. The estimated pulmonary artery systolic pressure is 41 mmHg.    IVC/SVC: Normal venous pressure at 3 mmHg.      Results for orders placed during the hospital encounter of 03/20/23    Echo    Interpretation Summary  · The left ventricle is normal in size with moderate concentric hypertrophy and normal systolic function.  · The estimated ejection fraction is 57%.  · Normal left ventricular diastolic function.  · Normal right ventricular size with normal right ventricular systolic function.  · Moderate to severe tricuspid regurgitation.  · Mild-to-moderate mitral regurgitation.  · Moderate left atrial enlargement.  · Mild right atrial enlargement.  · There is mild pulmonary hypertension.      Results for orders placed during the hospital encounter of 03/06/23    Echo    Interpretation Summary  · The left ventricle is normal in size  with normal systolic function.  · The estimated ejection fraction is 60%.  · Grade II left ventricular diastolic dysfunction.  · Normal right ventricular size with normal right ventricular systolic function.  · Moderate left atrial enlargement.  · Mild-to-moderate mitral regurgitation.  · Moderate to severe tricuspid regurgitation.  · There is mild pulmonary hypertension.  · There is mild aortic valve stenosis.  · Aortic valve area is 1.78 cm2; peak velocity is 2.01 m/s; mean gradient is 9 mmHg.      Results for orders placed during the hospital encounter of 01/26/22    Echo    Interpretation Summary  · The left ventricle is normal in size with mild concentric hypertrophy and normal systolic function.  · The estimated ejection fraction is 65%.  · Grade II left ventricular diastolic dysfunction.  · Atrial fibrillation not observed.  · Normal right ventricular size with normal right ventricular systolic function.  · Moderate to severe left atrial enlargement.  · Mild right atrial enlargement.  · Mild mitral regurgitation.  · Mild to moderate tricuspid regurgitation.  · Normal central venous pressure (3 mmHg).  · The estimated PA systolic pressure is 36 mmHg.  · Mildly elevated gradient accross mitral valve of around 6 mmHg at HR of 64 bpm. MVA by pressure half time is normal, however this can be inaccurate.      CURRENT/PREVIOUS VISIT EKG  Results for orders placed or performed during the hospital encounter of 07/15/24   EKG 12-lead    Collection Time: 07/16/24 10:28 AM   Result Value Ref Range    QRS Duration 100 ms    [facility] QTC Calculation 571 ms    Narrative    Test Reason : R00.1,    Vent. Rate : 058 BPM     Atrial Rate : 000 BPM     P-R Int : 000 ms          QRS Dur : 100 ms      QT Int : 582 ms       P-R-T Axes : 000 -27 052 degrees     QTc Int : 571 ms    Junctional rhythm  Minimal voltage criteria for LVH, may be normal variant ( [facility] [place] product )  Nonspecific ST and T wave abnormality  Prolonged QT  Abnormal  ECG  When compared with ECG of 15-JUL-2024 13:40,  Junctional rhythm has replaced Sinus rhythm  Nonspecific T wave abnormality no longer evident in Inferior leads    Referred By: [email or web address]   SELF           Confirmed By:            ASSESSMENT/PLAN:     Active Hospital Problems    Diagnosis    *Bradycardia    Paroxysmal atrial fibrillation    CAD s/p PCI    End stage renal disease on dialysis       ASSESSMENT & PLAN:   Bradycardia  Paroxysmal atrial fibrillation  CAD,   History of PCI  Severe mitral valve stenosis  ESRD on HD  Hypertension    RECOMMENDATIONS:  Mild troponin elevation chronic, stable from baseline, likely 2/2 ESRD  Echo in June 20, 2024 showed EF 60-65% and severe mitral stenosis    Sinus rhythm on telemetry today, heart rate high 50s-60s  Continue to hold metoprolol for now  We will restart low-dose amiodarone 100 mg daily - monitor heart rate closely.  Continuous telemetry  Continue Eliquis 2.5 mg BID for anticoagulation for PAF  Nuclear stress test in June 2024- negative for reversible ischemia  Continue with PT/OT as tolerated  Dialysis per nephrology - Hemodialysis M,W,F  Cardiology will sign off at this time will be available p.r.n. please not hesitate to reach out with any questions or concerns.  Would recommend to assist her in scheduling a follow-up visit with Dr. Thompson her cardiologist in 1-2 weeks post discharge.       Lizette Villegas NP  [facility]  Department of Cardiology  Date of Service: 07/18/2024        I have personally interviewed and examined the patient, I have reviewed the Nurse Practitioner's history and physical, assessment, and plan. I agree with the findings and plan.    Heart rate this afternoon is sinus rhythm in the 70s.  Will start very low-dose amiodarone 100 mg.  Follow-up with Dr. Jay Mcgregor M.D.  [facility]  Department of Cardiology  Date of Service: 07/18/2024  10:00 AM

## 2024-07-19 LAB
ALBUMIN SERPL BCP-MCNC: 3.8 G/DL (ref 3.5–5.2)
ALP SERPL-CCNC: 72 U/L (ref 55–135)
ALT SERPL W/O P-5'-P-CCNC: 12 U/L (ref 10–44)
ANION GAP SERPL CALC-SCNC: 12 MMOL/L (ref 8–16)
AST SERPL-CCNC: 11 U/L (ref 10–40)
BASOPHILS # BLD AUTO: 0.03 K/UL (ref 0–0.2)
BASOPHILS NFR BLD: 0.6 % (ref 0–1.9)
BILIRUB SERPL-MCNC: 0.4 MG/DL (ref 0.1–1)
BUN SERPL-MCNC: 49 MG/DL (ref 8–23)
CALCIUM SERPL-MCNC: 9.6 MG/DL (ref 8.7–10.5)
CHLORIDE SERPL-SCNC: 100 MMOL/L (ref 95–110)
CO2 SERPL-SCNC: 25 MMOL/L (ref 23–29)
CREAT SERPL-MCNC: 9.3 MG/DL (ref 0.5–1.4)
DIFFERENTIAL METHOD BLD: ABNORMAL
EOSINOPHIL # BLD AUTO: 0.7 K/UL (ref 0–0.5)
EOSINOPHIL NFR BLD: 12.7 % (ref 0–8)
ERYTHROCYTE [DISTWIDTH] IN BLOOD BY AUTOMATED COUNT: 17.8 % (ref 11.5–14.5)
EST. GFR  (NO RACE VARIABLE): 3.8 ML/MIN/1.73 M^2
GLUCOSE SERPL-MCNC: 79 MG/DL (ref 70–110)
HCT VFR BLD AUTO: 39.2 % (ref 37–48.5)
HGB BLD-MCNC: 11.6 G/DL (ref 12–16)
IMM GRANULOCYTES # BLD AUTO: 0.01 K/UL (ref 0–0.04)
IMM GRANULOCYTES NFR BLD AUTO: 0.2 % (ref 0–0.5)
LYMPHOCYTES # BLD AUTO: 1 K/UL (ref 1–4.8)
LYMPHOCYTES NFR BLD: 19 % (ref 18–48)
MAGNESIUM SERPL-MCNC: 2.4 MG/DL (ref 1.6–2.6)
MCH RBC QN AUTO: 26.2 PG (ref 27–31)
MCHC RBC AUTO-ENTMCNC: 29.6 G/DL (ref 32–36)
MCV RBC AUTO: 89 FL (ref 82–98)
MONOCYTES # BLD AUTO: 1 K/UL (ref 0.3–1)
MONOCYTES NFR BLD: 19 % (ref 4–15)
NEUTROPHILS # BLD AUTO: 2.5 K/UL (ref 1.8–7.7)
NEUTROPHILS NFR BLD: 48.5 % (ref 38–73)
NRBC BLD-RTO: 0 /100 WBC
PLATELET # BLD AUTO: 274 K/UL (ref 150–450)
PMV BLD AUTO: 9.8 FL (ref 9.2–12.9)
POTASSIUM SERPL-SCNC: 5.5 MMOL/L (ref 3.5–5.1)
PROT SERPL-MCNC: 7.1 G/DL (ref 6–8.4)
RBC # BLD AUTO: 4.42 M/UL (ref 4–5.4)
SODIUM SERPL-SCNC: 137 MMOL/L (ref 136–145)
WBC # BLD AUTO: 5.2 K/UL (ref 3.9–12.7)

## 2024-07-19 PROCEDURE — 25000003 PHARM REV CODE 250

## 2024-07-19 PROCEDURE — 90935 HEMODIALYSIS ONE EVALUATION: CPT

## 2024-07-19 PROCEDURE — 36415 COLL VENOUS BLD VENIPUNCTURE: CPT | Performed by: NURSE PRACTITIONER

## 2024-07-19 PROCEDURE — 80053 COMPREHEN METABOLIC PANEL: CPT | Performed by: NURSE PRACTITIONER

## 2024-07-19 PROCEDURE — 63600175 PHARM REV CODE 636 W HCPCS: Performed by: NURSE PRACTITIONER

## 2024-07-19 PROCEDURE — 63600175 PHARM REV CODE 636 W HCPCS: Mod: JZ,JG | Performed by: INTERNAL MEDICINE

## 2024-07-19 PROCEDURE — 83735 ASSAY OF MAGNESIUM: CPT | Performed by: NURSE PRACTITIONER

## 2024-07-19 PROCEDURE — 25000003 PHARM REV CODE 250: Performed by: NURSE PRACTITIONER

## 2024-07-19 PROCEDURE — 21400001 HC TELEMETRY ROOM

## 2024-07-19 PROCEDURE — 85025 COMPLETE CBC W/AUTO DIFF WBC: CPT | Performed by: NURSE PRACTITIONER

## 2024-07-19 RX ADMIN — AMIODARONE HYDROCHLORIDE 100 MG: 100 TABLET ORAL at 08:07

## 2024-07-19 RX ADMIN — CALCIUM ACETATE 667 MG: 667 CAPSULE ORAL at 08:07

## 2024-07-19 RX ADMIN — EPOETIN ALFA-EPBX 4600 UNITS: 10000 INJECTION, SOLUTION INTRAVENOUS; SUBCUTANEOUS at 12:07

## 2024-07-19 RX ADMIN — APIXABAN 2.5 MG: 2.5 TABLET, FILM COATED ORAL at 08:07

## 2024-07-19 RX ADMIN — MUPIROCIN 1 G: 20 OINTMENT TOPICAL at 08:07

## 2024-07-19 RX ADMIN — ONDANSETRON 4 MG: 2 INJECTION INTRAMUSCULAR; INTRAVENOUS at 04:07

## 2024-07-19 RX ADMIN — ACETAMINOPHEN 650 MG: 325 TABLET ORAL at 08:07

## 2024-07-19 RX ADMIN — GUAIFENESIN 200 MG: 200 SOLUTION ORAL at 04:07

## 2024-07-19 RX ADMIN — CALCIUM ACETATE 667 MG: 667 CAPSULE ORAL at 05:07

## 2024-07-19 NOTE — PLAN OF CARE
Problem: Adult Inpatient Plan of Care  Goal: Plan of Care Review  Outcome: Progressing     Problem: Adult Inpatient Plan of Care  Goal: Patient-Specific Goal (Individualized)  Outcome: Progressing     Problem: Adult Inpatient Plan of Care  Goal: Absence of Hospital-Acquired Illness or Injury  Outcome: Progressing     Problem: Adult Inpatient Plan of Care  Goal: Optimal Comfort and Wellbeing  Outcome: Progressing     Problem: Adult Inpatient Plan of Care  Goal: Readiness for Transition of Care  Outcome: Progressing

## 2024-07-19 NOTE — PROGRESS NOTES
[facility] Medicine  Progress Note    Patient Name: Tyra Isaac  MRN: [id number]  Patient Class: IP- Inpatient   Admission Date: 7/15/2024  Length of Stay: 3 days  Attending Physician: German Michael DO  Primary Care Provider: Kalpesh Goel MD        Subjective:     Principal Problem:Bradycardia        HPI:  84 year old female with PMH significant for ESRD on HD MWF, A-fib on Eliquis, HTN, CVA presented to the ER with chief complaint of shortness of breath and weakness.  Patient reports that her last HD session was this past Friday 7/12/24.  Over the weekend, she began to feel progressively more short of breath with associated generalized weakness and fatigue.  She woke up short of breath this morning which prompted ER evaluation rather than going to her HD appointment.  Patient denies chest pain, palpitations, abdominal pain, vomiting.  Does endorse mild dizziness and nausea.  Denies syncope.  In the ER, patient found to be markedly bradycardic with HR 30-40s.  BP stable.  Mentation at baseline.  Troponin mildly elevated at 32.7 with repeat 31.8.  BNP elevated.  Electrolytes stable.  Patient was recently admitted to the hospital and went into SVT/afib RVR during dialysis; cardiology was consulted and she had a negative stress test and was discharged on amiodarone and metoprolol.  Will admit patient for further evaluation and treatment.     Overview/Hospital Course:  Patient monitored closely during hospitalization.  She was admitted for weakness and bradycardia.  Cardiology was consulted.  Her home amiodarone and metoprolol were held.  She was monitored on continuous telemetry.  Nephrology was consulted for her dialysis management.    Cardiology consulted: Continue to hold metoprolol for now, restart low-dose amiodarone 100 mg daily - monitor heart rate closely.  Continuous telemetry, Continue Eliquis 2.5 mg BID for anticoagulation for PAF  Recommend stop eyedrops with timolol and  Onset: Sunday 4/9     Location / description: Patient tested positive for covid yesterday. Has cough- non productive, sore throat, some SOB of breath last night when laying down. No n/v/d. No fever. No ear pain. No chest pain. Slight body aches. Poor appetite but fluid intake is good. No urinary issues. No weakness/dizzines. Slight HA.  Precipitating Factors: covid  Pain Scale (1-10), 10 highest: 5/10  What improves/worsens symptoms: OTC meds  Symptom specific medications: Delsym 12 hour; tylenol;hot tea; OJ; water  LMP : No LMP recorded.   Are you pregnant or breast feeding: No  Recent visits (last 3-4 weeks) for same reason or recent surgery:  [facility]    PLAN:    Call Provider office when reopens  Home Care Advice provided   Routing paxlovid request to PCP    Patient/Caller agrees to follow recommendations.    Reason for Disposition  • [1] HIGH RISK for severe COVID complications (e.g., weak immune system, age > 64 years, obesity with BMI > 25, pregnant, chronic lung disease or other chronic medical condition) AND [2] COVID symptoms (e.g., cough, fever)  (Exceptions: Already seen by PCP and no new or worsening symptoms.)    Protocols used: CORONAVIRUS (COVID-19) DIAGNOSED OR [id number]       switch to non beta-blocker eyedrop. PT/OT was consulted and recommended SNF but patient declined, son agreeable to take her home with home health and we will likely process 7/20 as she finished dialysis late 7/19.      Interval History: Patient seen and examined, feeling weak after dialysis.    Review of Systems   Constitutional:  Positive for fatigue.   Respiratory:  Negative for shortness of breath.    Cardiovascular:  Negative for chest pain and palpitations.   Gastrointestinal:  Negative for abdominal pain and nausea.     Objective:     Vital Signs (Most Recent):  Temp: 98.2 °F (36.8 °C) (07/19/24 1547)  Pulse: 82 (07/19/24 1547)  Resp: 20 (07/19/24 1547)  BP: (!) 136/56 (07/19/24 1547)  SpO2: 96 % (07/19/24 1547) Vital Signs (24h Range):  Temp:  [97.9 °F (36.6 °C)-99.7 °F (37.6 °C)] 98.2 °F (36.8 °C)  Pulse:  [64-82] 82  Resp:  [16-20] 20  SpO2:  [94 %-97 %] 96 %  BP: ()/(42-77) 136/56     Weight: 47.8 kg (105 lb 6.1 oz)  Body mass index is 17.54 kg/m².    Intake/Output Summary (Last 24 hours) at 7/19/2024 1655  Last data filed at 7/19/2024 0927  Gross per 24 hour   Intake 290 ml   Output --   Net 290 ml         Physical Exam  Vitals and nursing note reviewed.   Constitutional:       General: She is not in acute distress.  Cardiovascular:      Rate and Rhythm: Normal rate and regular rhythm.   Pulmonary:      Effort: Pulmonary effort is normal. No respiratory distress.      Breath sounds: Normal breath sounds.   Abdominal:      Palpations: Abdomen is soft.      Tenderness: There is no abdominal tenderness.   Musculoskeletal:      Right lower leg: No edema.      Left lower leg: No edema.   Skin:     General: Skin is warm and dry.   Neurological:      Mental Status: She is alert and oriented to person, place, and time.             Significant Labs: All pertinent labs within the past 24 hours have been reviewed.  CBC:   Recent Labs   Lab 07/18/24  0351 07/19/24  0442   WBC 5.42 5.20   HGB 12.3 11.6*   HCT  41.1 39.2    274     CMP:   Recent Labs   Lab 07/18/24  0351 07/19/24  0442    137   K 5.1 5.5*    100   CO2 27 25   GLU 78 79   BUN 34* 49*   CREATININE 7.1* 9.3*   CALCIUM 9.6 9.6   PROT 7.7 7.1   ALBUMIN 4.0 3.8   BILITOT 0.5 0.4   ALKPHOS 80 72   AST 12 11   ALT 16 12   ANIONGAP 10 12     Magnesium:   Recent Labs   Lab 07/18/24  0351 07/19/24  0442   MG 2.2 2.4       Significant Imaging: I have reviewed all pertinent imaging results/findings within the past 24 hours.    Assessment/Plan:      * Bradycardia  Hemodynamically stable, alert   Cardiology consulted: Continue to hold metoprolol for now, restart low-dose amiodarone 100 mg daily - monitor heart rate closely.  Continuous telemetry, Continue Eliquis 2.5 mg BID for anticoagulation for PAF      CAD s/p PCI  Patient with known CAD s/p stent placement, which is controlled Will continue Statin and monitor for S/Sx of angina/ACS. Continue to monitor on telemetry.     Paroxysmal atrial fibrillation  Patient with atrial fibrillation which is controlled currently with Beta Blocker and Amiodarone. Patient is currently in sinus bradycardia.[id number] Score: 4. Anticoagulation indicated. Anticoagulation done with renally dosed Eliquis .  Heart rate is now within normal range, Cardiology has restarted amiodarone, continuing to hold beta-blocker due to soft pressures.  Cardiology consulted     End stage renal disease on dialysis  Creatine stable for now. BMP reviewed- noted Estimated Creatinine Clearance: 4.5 mL/min (A) (based on SCr of 6.8 mg/dL (H)). according to latest data. Based on current GFR, CKD stage is end stage.  Monitor UOP and serial BMP and adjust therapy as needed. Renally dose meds. Avoid nephrotoxic medications and procedures.  Nephrology consulted for hemodialysis management       VTE Risk Mitigation (From admission, onward)           Ordered     apixaban tablet 2.5 mg  2 times daily         07/16/24 0723     IP VTE HIGH RISK PATIENT   Once         07/15/24 0813     Place sequential compression device  Until discontinued         07/15/24 0813                    Discharge Planning   ILAN: 7/22/2024     Code Status: DNR   Is the patient medically ready for discharge?:     Reason for patient still in hospital (select all that apply): Treatment, Consult recommendations, PT / OT recommendations, and Pending disposition  Discharge Plan A: Home Health                  German Michael DO  Department of Hospital Medicine   [facility]

## 2024-07-19 NOTE — NURSING
Nurses Note -- 4 Eyes      7/18/2024   10:32 PM      Skin assessed during: Daily Assessment      [x] No Altered Skin Integrity Present    []Prevention Measures Documented      [] Yes- Altered Skin Integrity Present or Discovered   [] LDA Added if Not in Epic (Describe Wound)   [] New Altered Skin Integrity was Present on Admit and Documented in LDA   [] Wound Image Taken    Wound Care Consulted? No    Attending Nurse:  Kellee Henao RN/Staff Member:   Poly REYES

## 2024-07-19 NOTE — PT/OT/SLP PROGRESS
Physical Therapy      Patient Name:  Tyra Isaac   MRN:  [id number]    Patient not seen today secondary to Dialysis (x 2 attempts, one in am and one in pm). Will follow-up next service date.

## 2024-07-19 NOTE — NURSING
Consent and Hep b status verified, removed 1000 uf net, no issues with access. Post thrill and bruit noted, patient stable at departure, educated patient on infection control. Report given to Gayathri, floor nurse      07/19/24 4201   Handoff Report   Received From Mary Carmen   Given To Gayathri   Vital Signs   Temp 98.2 °F (36.8 °C)   Temp Source Oral   Pulse 66   Heart Rate Source Monitor   Resp 18   SpO2 98 %   Pulse Oximetry Type Intermittent   Probe Placed On (Pulse Ox) Right:;finger   Device (Oxygen Therapy) room air   BP (!) 104/49   BP Location Left arm   BP Method Automatic   Patient Position Lying   Assessments (Pre/Post)   Consent Obtained yes   Safety vein preservation armband present   Date Hepatitis Profile Obtained 11/23/23   Blood Liters Processed (BLP) 67.6   Transport Modality bed   Level of Consciousness (AVPU) alert   Dialyzer Clearance mildly streaked   Pain   Preferred Pain Scale number (Numeric Rating Pain Scale)   Comfort/Acceptable Pain Level 0   Pain Rating (0-10): Rest 0   Pain Rating (0-10): Activity 0   Pain Management Interventions quiet environment facilitated;position adjusted;pillow support provided   Pain/Comfort Interventions   Fever Reduction/Comfort Measures lightweight clothing;lightweight bedding   Pre-Hemodialysis Assessment   Additional Dialysis Information Needed Yes   Patient Status Departed   Treatment Consent Verified Yes   Treatment Status Completed        Hemodialysis AV Fistula Right upper arm   No Placement Date or Time found.   Present Prior to Hospital Arrival?: Yes  Location: Right upper arm   Site Assessment Clean;Dry;Intact   Patency Present;Thrill;Bruit   Status Deaccessed   Dressing Intervention First dressing   Dressing Status Clean;Dry;Intact   Site Condition No complications   Dressing Gauze   Post-Hemodialysis Assessment   Rinseback Volume (mL) 250 mL   Blood Volume Processed (Liters) 67.6 L   Dialyzer Clearance Lightly streaked   Duration of Treatment 180 minutes    Additional Fluid Intake (mL) 500 mL   Total UF (mL) 1500 mL   Net Fluid Removal 1000   Patient Response to Treatment [email or web address]   Post-Treatment Weight 46.8 kg (103 lb 2.8 oz)   Treatment Weight Change -1.9   Arterial bleeding stop time (min) 5 min   Venous bleeding stop time (min) 5 min   Post-Hemodialysis Comments stable

## 2024-07-19 NOTE — PLAN OF CARE
SW met with pt at bedside while at dialysis. Pt did not answer if she wanted to go to a SNF or home with HH.        07/19/24 5647   Discharge Plan   Discharge Plan A Skilled Nursing Facility   Discharge Plan B Home with family;Home Health

## 2024-07-19 NOTE — SUBJECTIVE & OBJECTIVE
Interval History: Patient seen and examined, feeling weak after dialysis.    Review of Systems   Constitutional:  Positive for fatigue.   Respiratory:  Negative for shortness of breath.    Cardiovascular:  Negative for chest pain and palpitations.   Gastrointestinal:  Negative for abdominal pain and nausea.     Objective:     Vital Signs (Most Recent):  Temp: 98.2 °F (36.8 °C) (07/19/24 1547)  Pulse: 82 (07/19/24 1547)  Resp: 20 (07/19/24 1547)  BP: (!) 136/56 (07/19/24 1547)  SpO2: 96 % (07/19/24 1547) Vital Signs (24h Range):  Temp:  [97.9 °F (36.6 °C)-99.7 °F (37.6 °C)] 98.2 °F (36.8 °C)  Pulse:  [64-82] 82  Resp:  [16-20] 20  SpO2:  [94 %-97 %] 96 %  BP: ()/(42-77) 136/56     Weight: 47.8 kg (105 lb 6.1 oz)  Body mass index is 17.54 kg/m².    Intake/Output Summary (Last 24 hours) at 7/19/2024 1655  Last data filed at 7/19/2024 0927  Gross per 24 hour   Intake 290 ml   Output --   Net 290 ml         Physical Exam  Vitals and nursing note reviewed.   Constitutional:       General: She is not in acute distress.  Cardiovascular:      Rate and Rhythm: Normal rate and regular rhythm.   Pulmonary:      Effort: Pulmonary effort is normal. No respiratory distress.      Breath sounds: Normal breath sounds.   Abdominal:      Palpations: Abdomen is soft.      Tenderness: There is no abdominal tenderness.   Musculoskeletal:      Right lower leg: No edema.      Left lower leg: No edema.   Skin:     General: Skin is warm and dry.   Neurological:      Mental Status: She is alert and oriented to person, place, and time.             Significant Labs: All pertinent labs within the past 24 hours have been reviewed.  CBC:   Recent Labs   Lab 07/18/24  0351 07/19/24  0442   WBC 5.42 5.20   HGB 12.3 11.6*   HCT 41.1 39.2    274     CMP:   Recent Labs   Lab 07/18/24  0351 07/19/24  0442    137   K 5.1 5.5*    100   CO2 27 25   GLU 78 79   BUN 34* 49*   CREATININE 7.1* 9.3*   CALCIUM 9.6 9.6   PROT 7.7 7.1    ALBUMIN 4.0 3.8   BILITOT 0.5 0.4   ALKPHOS 80 72   AST 12 11   ALT 16 12   ANIONGAP 10 12     Magnesium:   Recent Labs   Lab 07/18/24  0351 07/19/24  0442   MG 2.2 2.4       Significant Imaging: I have reviewed all pertinent imaging results/findings within the past 24 hours.

## 2024-07-19 NOTE — PROGRESS NOTES
INPATIENT NEPHROLOGY progress  [facility]    Patient Name: Tyra Isaac  Date: 07/19/2024    Reason for consultation: ESRD    Chief Complaint:   Chief Complaint   Patient presents with    Chest Pain    Shortness of Breath       History of Present Illness:  83 y/o F with hx of ESRD on HD MWF and pAF who p/w dyspnea, no chest pain, no exac/reliev factors, due for HD today, consulted for dialysis.    Interval History:  7/15- HR 40s, -200, on RA, BNP 3800 (900 2 weeks ago), trop leak- seen on HD, attempting 2L UF; CXR  bibasilar opacities may relate to atelectasis, aspiration, or pneumonia.   7/16  still housed and seen in ER.  No complaints, just wants to be moved to a room.  2L off w/HD yesterday, no acute dialysis needs today.  VSS.  7/17  K+ high, to have HD today per pt's schedule.  BP elevated, will UF as tolerated.  Eating breakfast, no complaints.    7/18  s/p net 1119 cc UF yesterday.  Bp labile.  Bradycardia a little better  7/19  Patient seen on hemodialysis for uremic clearance and ultrafiltration.        Plan of Care:    Assessment:  ESRD on HD MWF via right arm AVF  Hx of pAF/AF with RVR; Bradycardia   HTN emergency  SHPT  Anemia of CKD    Plan:    - ordered HD MWF  - cards eval pending- supposed to be on metop, amio, eliquis  - UF 2-3L- renal diet, 1.5L fluid restriction  - no acute binder needs  - no acute BRAYAN needs.  Add BRAYAN with hd if hg goes below 10  --renal diet  --renal dose medication for ESRD      Thank you for allowing us to participate in this patient's care. We will continue to follow.    Vital Signs:  Temp Readings from Last 3 Encounters:   07/19/24 99.7 °F (37.6 °C) (Oral)   07/06/24 98.6 °F (37 °C) (Oral)   06/03/24 98.5 °F (36.9 °C) (Oral)       Pulse Readings from Last 3 Encounters:   07/19/24 72   07/11/24 (!) 50   07/06/24 (!) 58       BP Readings from Last 3 Encounters:   07/19/24 (!) 133/53   07/11/24 (!) 138/54   07/06/24 (!) 190/77       Weight:  Wt  Readings from Last 3 Encounters:   07/19/24 47.8 kg (105 lb 6.1 oz)   07/11/24 46.3 kg (102 lb)   06/28/24 48.7 kg (107 lb 5.8 oz)       Past Medical & Surgical History:  Past Medical History:   Diagnosis Date    A-fib     Anxiety     Depression     Disorder of kidney and ureter     Encephalopathy acute 1/1/2018    End stage kidney disease 6/17/2017    Gout     Hyperlipidemia     Hypertension     Moderate episode of recurrent major depressive disorder 1/17/2018    Nephropathy hypertensive, 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    Osteopenia of multiple sites 3/9/2018    Based upon bone density measurements. Patient also has chronic kidney disease.    Stroke 11/2016       Past Surgical History:   Procedure Laterality Date    ANGIOGRAM, CORONARY, WITH LEFT HEART CATHETERIZATION N/A 2/7/2022    Procedure: Angiogram, Coronary, with Left Heart Cath;  Surgeon: Gino Leal MD;  Location: [facility] CATH/EP LAB;  Service: Cardiology;  Laterality: N/A;    CARDIAC SURGERY      stents    EYE SURGERY      WRIST SURGERY         Past Social History:  Social History     Socioeconomic History    Marital status:      Spouse name: Aria Isaac    Number of children: 3   Occupational History    Occupation: Not working   Tobacco Use    Smoking status: Never    Smokeless tobacco: Never   Substance and Sexual Activity    Alcohol use: No    Drug use: No    Sexual activity: Not Currently     Social Determinants of Health     Financial Resource Strain: Medium Risk (7/11/2024)    Overall Financial Resource Strain (CARDIA)     Difficulty of Paying Living Expenses: Somewhat hard   Food Insecurity: No Food Insecurity (7/11/2024)    Hunger Vital Sign     Worried About Running Out of Food in the Last Year: Never true     Ran Out of Food in the Last Year: Never true   Transportation Needs: No  Transportation Needs (7/11/2024)    TRANSPORTATION NEEDS     Transportation : No   Physical Activity: Inactive (6/15/2022)    Exercise Vital Sign     Days of Exercise per Week: 0 days     Minutes of Exercise per Session: 0 min   Stress: Stress Concern Present (7/11/2024)    [facility] Occupational [facility] - Occupational Stress Questionnaire     Feeling of Stress : To some extent   Housing Stability: Patient Declined (6/30/2024)    Housing Stability Vital Sign     Unable to Pay for Housing in the Last Year: Patient declined     Homeless in the Last Year: Patient declined       Medications:  Scheduled Meds:   amiodarone  100 mg Oral Daily    apixaban  2.5 mg Oral BID    calcium acetate(phosphat bind)  667 mg Oral TID WM    epoetin rosy-ebpx (RETACRIT) injection  100 Units/kg Intravenous Every Mon, Wed, Fri    mupirocin   Nasal BID     Continuous Infusions:  PRN Meds:.  Current Facility-Administered Medications:     acetaminophen, 650 mg, Oral, Q8H PRN    acetaminophen, 650 mg, Oral, Q4H PRN    aluminum-magnesium hydroxide-simethicone, 30 mL, Oral, QID PRN    carboxymethylcellulose, 1 drop, Both Eyes, TID PRN    dextrose 50%, 12.5 g, Intravenous, PRN    dextrose 50%, 25 g, Intravenous, PRN    glucagon (human recombinant), 1 mg, Intramuscular, PRN    glucose, 16 g, Oral, PRN    glucose, 24 g, Oral, PRN    guaiFENesin 100 mg/5 ml, 200 mg, Oral, Q6H PRN    hydrALAZINE, 10 mg, Intravenous, Q6H PRN    magnesium oxide, 800 mg, Oral, PRN    magnesium oxide, 800 mg, Oral, PRN    melatonin, 6 mg, Oral, Nightly PRN    naloxone, 0.02 mg, Intravenous, PRN    ondansetron, 4 mg, Intravenous, Q6H PRN    potassium bicarbonate, 35 mEq, Oral, PRN    potassium bicarbonate, 50 mEq, Oral, PRN    potassium bicarbonate, 60 mEq, Oral, PRN    potassium, sodium phosphates, 2 packet, Oral, PRN    potassium, sodium phosphates, 2 packet, Oral, PRN    potassium, sodium phosphates, 2 packet, Oral, PRN    senna-docusate 8.6-50 mg, 1 tablet,  Oral, Daily PRN    sodium chloride 0.9%, 3 mL, Intravenous, Q12H PRN  No current facility-administered medications on file prior to encounter.     Current Outpatient Medications on File Prior to Encounter   Medication Sig Dispense Refill    amiodarone (PACERONE) 200 MG Tab Take 1 tablet (200 mg total) by mouth 3 (three) times daily for 14 days, THEN 1 tablet (200 mg total) 2 (two) times daily for 7 days. 56 tablet 0    atorvastatin (LIPITOR) 40 MG tablet Take 40 mg by mouth once daily.      b complex vitamins tablet Take 1 tablet by mouth once daily.      dorzolamide-timolol 2-0.5% (COSOPT) 22.3-6.8 mg/mL ophthalmic solution Place 1 drop into both eyes 2 (two) times daily.      ELIQUIS 2.5 mg Tab Take 1 tablet (2.5 mg total) by mouth 2 (two) times daily. 60 tablet 0    latanoprost 0.005 % ophthalmic solution Place 1 drop into both eyes every evening.      metoclopramide HCl (REGLAN) 5 MG tablet Take 1 tablet (5 mg total) by mouth 3 (three) times daily before meals. 30 tablet 1    metoprolol tartrate (LOPRESSOR) 25 MG tablet Take 0.5 tablets (12.5 mg total) by mouth 2 (two) times daily. 30 tablet 0    midodrine (PROAMATINE) 10 MG tablet Take 1 tablet (10 mg total) by mouth 3 (three) times daily with meals. 270 tablet 0    ondansetron (ZOFRAN-ODT) 4 MG TbDL Take 1 tablet (4 mg total) by mouth every 6 (six) hours as needed (nausea). 30 tablet 5    loperamide (IMODIUM A-D) 2 mg Tab Take 1 tablet (2 mg total) by mouth 4 (four) times daily as needed (diarrhea). (Patient not taking: Reported on 7/15/2024) 3 tablet 0       Allergies:  Cyclobenzaprine, Fish containing products, Peanut, and Tramadol    Past Family History:  Reviewed; refer to Hospitalist Admission Note    Review of Systems:  Review of Systems - All 14 systems reviewed and negative, except as noted in HPI    Physical Exam:  General Appearance:    Lethargic, AAO x 3, cooperative, appears stated age   Head:    Normocephalic, atraumatic   Eyes:    PER, EOMI, and  conjunctiva/sclera clear bilaterally       Mouth:   Moist mucus membranes, no thrush or oral lesions,       normal dentition   Back:     No CVA tenderness   Lungs:     Coarse   Chest wall:    No tenderness or deformity   Heart:    Regular rate and rhythm, S1 and S2 normal, no murmur, rub   or gallop   Abdomen:     Soft, non-tender, non-distended, bowel sounds active all four   quadrants, no RT or guarding, no masses, no organomegaly   Extremities:   Warm and well perfused, distal pulses are intact, no             cyanosis or peripheral edema   MSK:   No joint or muscle swelling, tenderness or deformity   Skin:   Skin color, texture, turgor normal, no rashes or lesions   Neurologic/Psychiatric:   CNII-XII intact     Results:  Lab Results   Component Value Date     07/19/2024    K 5.5 (H) 07/19/2024     07/19/2024    CO2 25 07/19/2024    BUN 49 (H) 07/19/2024    CREATININE 9.3 (H) 07/19/2024    CALCIUM 9.6 07/19/2024    ANIONGAP 12 07/19/2024    ESTGFRAFRICA 4.7 (A) 02/08/2022    EGFRNONAA 4.0 (A) 02/08/2022       Lab Results   Component Value Date    CALCIUM 9.6 07/19/2024    PHOS 4.6 (H) 06/30/2024       Recent Labs   Lab 07/19/24  0442   WBC 5.20   RBC 4.42   HGB 11.6*   HCT 39.2      MCV 89   MCH 26.2*   MCHC 29.6*     Imaging Results              X-Ray Chest AP Portable (Final result)  Result time 07/15/24 06:54:18      Final result by Phillip Magaña MD (07/15/24 06:54:18)                   Impression:      Ill-defined bibasilar opacities may relate to atelectasis, aspiration, or pneumonia.      Electronically signed by: Phillip Magaña  Date:    07/15/2024  Time:    06:54               Narrative:    EXAMINATION:  XR CHEST AP PORTABLE    CLINICAL HISTORY:  Chest Pain;    TECHNIQUE:  Single frontal view of the chest was performed.    COMPARISON:  Chest radiograph performed 07/01/2024, 13:12 hours.    FINDINGS:  Monitoring leads overlie the chest.  Grossly unchanged cardiomediastinal  contours.    Chronic interstitial coarsening is noted.    Ill-defined bibasilar opacities.    No definite pneumothorax or large volume pleural effusion.  No acute findings in the visualized abdomen.    Osseous and soft tissue structures appear without definite acute change.                                    Patient care was time spent personally by me on the following activities:   Obtaining a history  Examination of patient.  Providing medical care at the patients bedside.  Developing a treatment plan with patient or surrogate and bedside caregivers  Ordering and reviewing laboratory studies, radiographic studies, pulse oximetry.  Ordering and performing treatments and interventions.  Evaluation of patient's response to treatment.  Discussions with consultants while on the unit and immediately available to the patient.  Re-evaluation of the patient's condition.  Documentation in the medical record.       Jimeenz Valero MD    [facility] [address] [address]  [phone] (p)  [phone] (f)

## 2024-07-19 NOTE — ASSESSMENT & PLAN NOTE
Patient with atrial fibrillation which is controlled currently with Beta Blocker and Amiodarone. Patient is currently in sinus bradycardia.[id number] Score: 4. Anticoagulation indicated. Anticoagulation done with renally dosed Eliquis .  Heart rate is now within normal range, Cardiology has restarted amiodarone, continuing to hold beta-blocker due to soft pressures.  Cardiology consulted

## 2024-07-19 NOTE — PLAN OF CARE
CM spoke to patient's son, Raffi, via telephone regarding discharge plan. Per Raffi, he is on board with whatever the patient wants to do and is ok with patient discharging home to resume home health with [facility].  Resumption orders to be sent to [facility] due to patient last stating to therapy this is what she would rather do.  CM following.    1410- Resumption orders sent to [facility] via [facility] Fax.  Son stated he will be transporting the patient home at discharge.        07/19/24 1401   Post-Acute Status   Post-Acute Authorization Home Health;Placement   Discharge Plan   Discharge Plan A Home Health   Discharge Plan B Skilled Nursing Facility

## 2024-07-19 NOTE — PT/OT/SLP PROGRESS
Occupational Therapy      Patient Name:  Tyra Isaac   MRN:  [id number]    Patient not seen today secondary to Dialysis.     7/19/2024

## 2024-07-20 VITALS
RESPIRATION RATE: 17 BRPM | WEIGHT: 105.38 LBS | HEIGHT: 65 IN | TEMPERATURE: 97 F | BODY MASS INDEX: 17.56 KG/M2 | DIASTOLIC BLOOD PRESSURE: 64 MMHG | OXYGEN SATURATION: 95 % | HEART RATE: 70 BPM | SYSTOLIC BLOOD PRESSURE: 163 MMHG

## 2024-07-20 LAB
ALBUMIN SERPL BCP-MCNC: 4.1 G/DL (ref 3.5–5.2)
ALP SERPL-CCNC: 79 U/L (ref 55–135)
ALT SERPL W/O P-5'-P-CCNC: 10 U/L (ref 10–44)
ANION GAP SERPL CALC-SCNC: 10 MMOL/L (ref 8–16)
AST SERPL-CCNC: 11 U/L (ref 10–40)
BASOPHILS # BLD AUTO: 0.02 K/UL (ref 0–0.2)
BASOPHILS NFR BLD: 0.4 % (ref 0–1.9)
BILIRUB SERPL-MCNC: 0.4 MG/DL (ref 0.1–1)
BUN SERPL-MCNC: 33 MG/DL (ref 8–23)
CALCIUM SERPL-MCNC: 9.6 MG/DL (ref 8.7–10.5)
CHLORIDE SERPL-SCNC: 101 MMOL/L (ref 95–110)
CO2 SERPL-SCNC: 24 MMOL/L (ref 23–29)
CREAT SERPL-MCNC: 7.1 MG/DL (ref 0.5–1.4)
DIFFERENTIAL METHOD BLD: ABNORMAL
EOSINOPHIL # BLD AUTO: 0.5 K/UL (ref 0–0.5)
EOSINOPHIL NFR BLD: 8.7 % (ref 0–8)
ERYTHROCYTE [DISTWIDTH] IN BLOOD BY AUTOMATED COUNT: 18.5 % (ref 11.5–14.5)
EST. GFR  (NO RACE VARIABLE): 5.3 ML/MIN/1.73 M^2
GLUCOSE SERPL-MCNC: 86 MG/DL (ref 70–110)
HCT VFR BLD AUTO: 41.3 % (ref 37–48.5)
HGB BLD-MCNC: 12.1 G/DL (ref 12–16)
IMM GRANULOCYTES # BLD AUTO: 0.03 K/UL (ref 0–0.04)
IMM GRANULOCYTES NFR BLD AUTO: 0.5 % (ref 0–0.5)
LYMPHOCYTES # BLD AUTO: 0.7 K/UL (ref 1–4.8)
LYMPHOCYTES NFR BLD: 12.1 % (ref 18–48)
MAGNESIUM SERPL-MCNC: 2.3 MG/DL (ref 1.6–2.6)
MCH RBC QN AUTO: 25.9 PG (ref 27–31)
MCHC RBC AUTO-ENTMCNC: 29.3 G/DL (ref 32–36)
MCV RBC AUTO: 88 FL (ref 82–98)
MONOCYTES # BLD AUTO: 1 K/UL (ref 0.3–1)
MONOCYTES NFR BLD: 18.4 % (ref 4–15)
NEUTROPHILS # BLD AUTO: 3.3 K/UL (ref 1.8–7.7)
NEUTROPHILS NFR BLD: 59.9 % (ref 38–73)
NRBC BLD-RTO: 0 /100 WBC
PLATELET # BLD AUTO: 269 K/UL (ref 150–450)
PMV BLD AUTO: 9.9 FL (ref 9.2–12.9)
POTASSIUM SERPL-SCNC: 5 MMOL/L (ref 3.5–5.1)
PROT SERPL-MCNC: 7.6 G/DL (ref 6–8.4)
RBC # BLD AUTO: 4.68 M/UL (ref 4–5.4)
SODIUM SERPL-SCNC: 135 MMOL/L (ref 136–145)
WBC # BLD AUTO: 5.54 K/UL (ref 3.9–12.7)

## 2024-07-20 PROCEDURE — 36415 COLL VENOUS BLD VENIPUNCTURE: CPT | Performed by: NURSE PRACTITIONER

## 2024-07-20 PROCEDURE — 83735 ASSAY OF MAGNESIUM: CPT | Performed by: NURSE PRACTITIONER

## 2024-07-20 PROCEDURE — 25000003 PHARM REV CODE 250

## 2024-07-20 PROCEDURE — 80053 COMPREHEN METABOLIC PANEL: CPT | Performed by: NURSE PRACTITIONER

## 2024-07-20 PROCEDURE — 85025 COMPLETE CBC W/AUTO DIFF WBC: CPT | Performed by: NURSE PRACTITIONER

## 2024-07-20 PROCEDURE — 25000003 PHARM REV CODE 250: Performed by: NURSE PRACTITIONER

## 2024-07-20 RX ORDER — AMIODARONE HYDROCHLORIDE 100 MG/1
100 TABLET ORAL DAILY
Qty: 30 TABLET | Refills: 0 | Status: SHIPPED | OUTPATIENT
Start: 2024-07-20 | End: 2025-07-20

## 2024-07-20 RX ADMIN — CALCIUM ACETATE 667 MG: 667 CAPSULE ORAL at 08:07

## 2024-07-20 RX ADMIN — GUAIFENESIN 200 MG: 200 SOLUTION ORAL at 08:07

## 2024-07-20 RX ADMIN — CALCIUM ACETATE 667 MG: 667 CAPSULE ORAL at 12:07

## 2024-07-20 RX ADMIN — MUPIROCIN 1 G: 20 OINTMENT TOPICAL at 08:07

## 2024-07-20 RX ADMIN — APIXABAN 2.5 MG: 2.5 TABLET, FILM COATED ORAL at 08:07

## 2024-07-20 RX ADMIN — AMIODARONE HYDROCHLORIDE 100 MG: 100 TABLET ORAL at 08:07

## 2024-07-20 NOTE — PLAN OF CARE
2:26  SW met with Pt at bedside to discuss discharge options. Pt and daughter were expressing concern of Pt being discharged to a facility because Pt was at [place] prior to this admission and she said she had to pay $285 a day, and sent her a $7000 bill after she transitioned from skilled to [facility].   SW explained the differences between a skilled nursing facility and [facility] care. Pt and daughter were not understanding so SW explained another way. SW stated that the recommendations are for skilled nursing facility where the insurance will cover her and the stay is temporary unlike [facility] where she will have to pay and stay indefinitely. Pt stated she does not want to go to another facility for fear she will get charged enven though SW explained the differences and that she was not being recommended for [facility]. SW stated that the family can gather and help Pt make her decision. SW left the room confident that both Pt and her daughter understood the differences after having explained it another time.     2:42  SW called Raffi, Pt's son and POA to inform him Pt was being discharged. He said he is deciding on his mother's words that she wants to come home and he will come and pick her up when she is ready to leave.

## 2024-07-20 NOTE — PLAN OF CARE
[facility]  Initial Discharge Assessment       Primary Care Provider: Kalpesh Goel MD    Admission Diagnosis: Bradycardia [R00.1]    Admission Date: 7/15/2024  Expected Discharge Date: 7/20/2024    CM met with Pt and her Daughter and according to the patient and her daughter she was not interested in SNF she would rather go home with home health, but she wants to wait until her sone with POA comes and they will discuss it and they will make a decision. CM told her once they make a decision to let the Dr. & Nurse know their plans and they will notify CM.      Transition of Care Barriers: None    Payor: HU[employer] MANAGED MEDICARE / Plan: HU[employer]NA MEDICARE HMO / Product Type: Capitation /     Extended Emergency Contact Information  Primary Emergency Contact: Raffi Isaac  Address: [address] [place] [address] [place] [address] [facility]  Home Phone: [phone]  Mobile Phone: [phone]  Relation: Son   needed? No  Secondary Emergency Contact: Marcelo Isaac  Mobile Phone: [phone]  Relation: Son    Discharge Plan A: Home Health  Discharge Plan B: Skilled Nursing Facility      [facility] [address] [facility], [place] - [address]  [address]  [facility] [address]  Phone: [phone] Fax: [phone]    [facility] - [place], [place] - [address]  [address]  [place] [address]  Phone: [phone] Fax: [phone]      Initial Assessment (most recent)       Adult Discharge Assessment - 07/15/24 0928          Discharge Assessment    Assessment Type Discharge Planning Assessment     Confirmed/corrected address, phone number and insurance Yes     Confirmed Demographics Correct on Facesheet     Source of Information health record     Does patient/caregiver understand observation status Yes     Communicated ILAN with patient/caregiver Date not available/Unable to determine     People in Home child(aron), adult     Do you expect to return to your  current living situation? Yes     Do you have help at home or someone to help you manage your care at home? Yes     Who are your caregiver(s) and their phone number(s)? PonchoPanteraRaffi (Son)  [phone]     Walking or Climbing Stairs Difficulty yes     Walking or Climbing Stairs ambulation difficulty, requires equipment     Dressing/Bathing Difficulty yes     Dressing/Bathing bathing difficulty, assistance 1 person;bathing difficulty, requires equipment     Home Accessibility wheelchair accessible     Home Layout Able to live on 1st floor     Equipment Currently Used at Home power chair     Readmission within 30 days? Yes     Patient currently being followed by outpatient case management? No     Do you currently have service(s) that help you manage your care at home? Yes     Name and Contact number of agency [facility]     Is the pt/caregiver preference to resume services with current agency Yes     Do you take prescription medications? Yes     Do you have prescription coverage? Yes     Coverage Payor: HUMANA MANAGED MEDICARE - HUMANA MEDICARE HMO - CAPITATED     Do you have any problems affording any of your prescribed medications? No     Is the patient taking medications as prescribed? yes     Who is going to help you get home at discharge? Raffi Isaac (Son)  [phone]     How do you get to doctors appointments? family or friend will provide     Are you on dialysis? Yes     Dialysis Name and Scheduled days MWF [facility] of Dr. Valero     Do you take coumadin? No     Discharge Plan A Home Health     Discharge Plan B Home Health     Transition of Care Barriers None

## 2024-07-20 NOTE — NURSING
Nurses Note -- 4 Eyes      7/19/2024   11:16 PM      Skin assessed during: Daily Assessment      [x] No Altered Skin Integrity Present    []Prevention Measures Documented      [] Yes- Altered Skin Integrity Present or Discovered   [] LDA Added if Not in Epic (Describe Wound)   [] New Altered Skin Integrity was Present on Admit and Documented in LDA   [] Wound Image Taken    Wound Care Consulted? No    Attending Nurse:  Kellee Henao RN/Staff Member:  Daija REYES

## 2024-07-20 NOTE — PROGRESS NOTES
Pt states wishes to go home, family at bedside. MD and case management has cleared pt for discharge, discharge instructions provided to pt and family, IV/tele monitoring discontinued. Pt discharged via wheelchair with belongings per family.

## 2024-07-20 NOTE — PROGRESS NOTES
INPATIENT NEPHROLOGY Progress Note  [facility]    Patient Name: Tyra Isaac  Date: 07/20/2024    Reason for consultation: ESRD    Chief Complaint:   Chief Complaint   Patient presents with    Chest Pain    Shortness of Breath       History of Present Illness:  83 y/o F with hx of ESRD on HD MWF and pAF who p/w dyspnea, no chest pain, no exac/reliev factors, due for HD today, consulted for dialysis.    Interval History:  7/15- HR 40s, -200, on RA, BNP 3800 (900 2 weeks ago), trop leak- seen on HD, attempting 2L UF; CXR  bibasilar opacities may relate to atelectasis, aspiration, or pneumonia.   7/16  still housed and seen in ER.  No complaints, just wants to be moved to a room.  2L off w/HD yesterday, no acute dialysis needs today.  VSS.  7/17  K+ high, to have HD today per pt's schedule.  BP elevated, will UF as tolerated.  Eating breakfast, no complaints.    7/18 s/p net 1119 cc UF yesterday.  Bp labile.  Bradycardia a little better  7/19 Patient seen on hemodialysis for uremic clearance and ultrafiltration.    7/20 HR 60-70s, low BP yest afternoon, better now, on RA,     Plan of Care:    Assessment:  ESRD on HD MWF via right arm AVF  Hx of pAF/AF with RVR; Bradycardia   HTN emergency  SHPT  Anemia of CKD  Hyperkalemia    Plan:    - HD MWF  - on amio, eliquis   - UF 2-3L- renal diet, 1.5L fluid restriction  - no acute binder needs  - no acute BRAYAN needs  - start lokelma 10g on non HD days    Doesn't feel like she can go home today- she needs to discuss her goals of care and home safety with her 3 kids- considering living in a facility- not ready for hospice however- she realizes the end of her life may be coming and she wants to be prepared.    Thank you for allowing us to participate in this patient's care. We will continue to follow.    Vital Signs:  Temp Readings from Last 3 Encounters:   07/20/24 96.1 °F (35.6 °C) (Temporal)   07/06/24 98.6 °F (37 °C) (Oral)   06/03/24 98.5 °F  (36.9 °C) (Oral)       Pulse Readings from Last 3 Encounters:   07/20/24 68   07/11/24 (!) 50   07/06/24 (!) 58       BP Readings from Last 3 Encounters:   07/20/24 (!) 151/50   07/11/24 (!) 138/54   07/06/24 (!) 190/77       Weight:  Wt Readings from Last 3 Encounters:   07/19/24 47.8 kg (105 lb 6.1 oz)   07/11/24 46.3 kg (102 lb)   06/28/24 48.7 kg (107 lb 5.8 oz)       Medications:  Scheduled Meds:   amiodarone  100 mg Oral Daily    apixaban  2.5 mg Oral BID    calcium acetate(phosphat bind)  667 mg Oral TID WM    epoetin rosy-ebpx (RETACRIT) injection  100 Units/kg Intravenous Every Mon, Wed, Fri    mupirocin   Nasal BID     Continuous Infusions:  PRN Meds:.  Current Facility-Administered Medications:     acetaminophen, 650 mg, Oral, Q8H PRN    acetaminophen, 650 mg, Oral, Q4H PRN    aluminum-magnesium hydroxide-simethicone, 30 mL, Oral, QID PRN    carboxymethylcellulose, 1 drop, Both Eyes, TID PRN    dextrose 50%, 12.5 g, Intravenous, PRN    dextrose 50%, 25 g, Intravenous, PRN    glucagon (human recombinant), 1 mg, Intramuscular, PRN    glucose, 16 g, Oral, PRN    glucose, 24 g, Oral, PRN    guaiFENesin 100 mg/5 ml, 200 mg, Oral, Q6H PRN    hydrALAZINE, 10 mg, Intravenous, Q6H PRN    magnesium oxide, 800 mg, Oral, PRN    magnesium oxide, 800 mg, Oral, PRN    melatonin, 6 mg, Oral, Nightly PRN    naloxone, 0.02 mg, Intravenous, PRN    ondansetron, 4 mg, Intravenous, Q6H PRN    potassium bicarbonate, 35 mEq, Oral, PRN    potassium bicarbonate, 50 mEq, Oral, PRN    potassium bicarbonate, 60 mEq, Oral, PRN    potassium, sodium phosphates, 2 packet, Oral, PRN    potassium, sodium phosphates, 2 packet, Oral, PRN    potassium, sodium phosphates, 2 packet, Oral, PRN    senna-docusate 8.6-50 mg, 1 tablet, Oral, Daily PRN    sodium chloride 0.9%, 3 mL, Intravenous, Q12H PRN  No current facility-administered medications on file prior to encounter.     Current Outpatient Medications on File Prior to Encounter   Medication  Sig Dispense Refill    amiodarone (PACERONE) 200 MG Tab Take 1 tablet (200 mg total) by mouth 3 (three) times daily for 14 days, THEN 1 tablet (200 mg total) 2 (two) times daily for 7 days. 56 tablet 0    atorvastatin (LIPITOR) 40 MG tablet Take 40 mg by mouth once daily.      b complex vitamins tablet Take 1 tablet by mouth once daily.      dorzolamide-timolol 2-0.5% (COSOPT) 22.3-6.8 mg/mL ophthalmic solution Place 1 drop into both eyes 2 (two) times daily.      ELIQUIS 2.5 mg Tab Take 1 tablet (2.5 mg total) by mouth 2 (two) times daily. 60 tablet 0    latanoprost 0.005 % ophthalmic solution Place 1 drop into both eyes every evening.      metoclopramide HCl (REGLAN) 5 MG tablet Take 1 tablet (5 mg total) by mouth 3 (three) times daily before meals. 30 tablet 1    metoprolol tartrate (LOPRESSOR) 25 MG tablet Take 0.5 tablets (12.5 mg total) by mouth 2 (two) times daily. 30 tablet 0    midodrine (PROAMATINE) 10 MG tablet Take 1 tablet (10 mg total) by mouth 3 (three) times daily with meals. 270 tablet 0    ondansetron (ZOFRAN-ODT) 4 MG TbDL Take 1 tablet (4 mg total) by mouth every 6 (six) hours as needed (nausea). 30 tablet 5    loperamide (IMODIUM A-D) 2 mg Tab Take 1 tablet (2 mg total) by mouth 4 (four) times daily as needed (diarrhea). (Patient not taking: Reported on 7/15/2024) 3 tablet 0       Review of Systems:  Neg    Physical Exam:  General Appearance:    Lethargic, AAO x 3, cooperative, appears stated age   Head:    Normocephalic, atraumatic   Eyes:    PER, EOMI, and conjunctiva/sclera clear bilaterally       Mouth:   Moist mucus membranes, no thrush or oral lesions,       normal dentition   Back:     No CVA tenderness   Lungs:     Coarse   Chest wall:    No tenderness or deformity   Heart:    Regular rate and rhythm, S1 and S2 normal, no murmur, rub   or gallop   Abdomen:     Soft, non-tender, non-distended, bowel sounds active all four   quadrants, no RT or guarding, no masses, no organomegaly    Extremities:   Warm and well perfused, distal pulses are intact, no             cyanosis or peripheral edema   MSK:   No joint or muscle swelling, tenderness or deformity   Skin:   Skin color, texture, turgor normal, no rashes or lesions   Neurologic/Psychiatric:   CNII-XII intact     Results:  Lab Results   Component Value Date     (L) 07/20/2024    K 5.0 07/20/2024     07/20/2024    CO2 24 07/20/2024    BUN 33 (H) 07/20/2024    CREATININE 7.1 (H) 07/20/2024    CALCIUM 9.6 07/20/2024    ANIONGAP 10 07/20/2024    ESTGFRAFRICA 4.7 (A) 02/08/2022    EGFRNONAA 4.0 (A) 02/08/2022       Lab Results   Component Value Date    CALCIUM 9.6 07/20/2024    PHOS 4.6 (H) 06/30/2024       Recent Labs   Lab 07/20/24  0416   WBC 5.54   RBC 4.68   HGB 12.1   HCT 41.3      MCV 88   MCH 25.9*   MCHC 29.3*     Imaging Results              X-Ray Chest AP Portable (Final result)  Result time 07/15/24 06:54:18      Final result by Phillip Magaña MD (07/15/24 06:54:18)                   Impression:      Ill-defined bibasilar opacities may relate to atelectasis, aspiration, or pneumonia.      Electronically signed by: Phillip Magaña  Date:    07/15/2024  Time:    06:54               Narrative:    EXAMINATION:  XR CHEST AP PORTABLE    CLINICAL HISTORY:  Chest Pain;    TECHNIQUE:  Single frontal view of the chest was performed.    COMPARISON:  Chest radiograph performed 07/01/2024, 13:12 hours.    FINDINGS:  Monitoring leads overlie the chest.  Grossly unchanged cardiomediastinal contours.    Chronic interstitial coarsening is noted.    Ill-defined bibasilar opacities.    No definite pneumothorax or large volume pleural effusion.  No acute findings in the visualized abdomen.    Osseous and soft tissue structures appear without definite acute change.                                    Patient care was time spent personally by me on the following activities: > 35 min  Obtaining a history  Examination of  patient.  Providing medical care at the patients bedside.  Developing a treatment plan with patient or surrogate and bedside caregivers  Ordering and reviewing laboratory studies, radiographic studies, pulse oximetry.  Ordering and performing treatments and interventions.  Evaluation of patient's response to treatment.  Discussions with consultants while on the unit and immediately available to the patient.  Re-evaluation of the patient's condition.  Documentation in the medical record.       Rose Maynard MD    [facility] [address] [address]  [phone] (p)  [phone] (f)

## 2024-07-20 NOTE — NURSING
During rounds with MD, pt states would like to go home today.     1210- pt daughter at bedside, pt now states would like to talk to sons before deciding if she feels ready to go home today. Daughter states family is coming this afternoon. MD and case management notified.

## 2024-07-20 NOTE — SUBJECTIVE & OBJECTIVE
Interval History: Patient seen and examined, feeling weak after dialysis.    Review of Systems   Constitutional:  Positive for fatigue.   Respiratory:  Negative for shortness of breath.    Cardiovascular:  Negative for chest pain and palpitations.   Gastrointestinal:  Negative for abdominal pain and nausea.     Objective:     Vital Signs (Most Recent):  Temp: 96.1 °F (35.6 °C) (07/20/24 0719)  Pulse: 68 (07/20/24 0719)  Resp: 17 (07/20/24 0719)  BP: (!) 151/50 (07/20/24 0719)  SpO2: (!) 92 % (07/20/24 0719) Vital Signs (24h Range):  Temp:  [96.1 °F (35.6 °C)-99.7 °F (37.6 °C)] 96.1 °F (35.6 °C)  Pulse:  [64-82] 68  Resp:  [16-20] 17  SpO2:  [92 %-98 %] 92 %  BP: ()/(42-64) 151/50     Weight: 47.8 kg (105 lb 6.1 oz)  Body mass index is 17.54 kg/m².    Intake/Output Summary (Last 24 hours) at 7/20/2024 0909  Last data filed at 7/20/2024 0822  Gross per 24 hour   Intake 790 ml   Output 1500 ml   Net -710 ml         Physical Exam  Vitals and nursing note reviewed.   Constitutional:       General: She is not in acute distress.  Cardiovascular:      Rate and Rhythm: Normal rate and regular rhythm.   Pulmonary:      Effort: Pulmonary effort is normal. No respiratory distress.      Breath sounds: Normal breath sounds.   Abdominal:      Palpations: Abdomen is soft.      Tenderness: There is no abdominal tenderness.   Musculoskeletal:      Right lower leg: No edema.      Left lower leg: No edema.   Skin:     General: Skin is warm and dry.   Neurological:      Mental Status: She is alert and oriented to person, place, and time.             Significant Labs: All pertinent labs within the past 24 hours have been reviewed.  CBC:   Recent Labs   Lab 07/19/24 0442 07/20/24 0416   WBC 5.20 5.54   HGB 11.6* 12.1   HCT 39.2 41.3    269     CMP:   Recent Labs   Lab 07/19/24 0442 07/20/24 0416    135*   K 5.5* 5.0    101   CO2 25 24   GLU 79 86   BUN 49* 33*   CREATININE 9.3* 7.1*   CALCIUM 9.6 9.6   PROT  7.1 7.6   ALBUMIN 3.8 4.1   BILITOT 0.4 0.4   ALKPHOS 72 79   AST 11 11   ALT 12 10   ANIONGAP 12 10     Magnesium:   Recent Labs   Lab 07/19/24  0442 07/20/24  0416   MG 2.4 2.3       Significant Imaging: I have reviewed all pertinent imaging results/findings within the past 24 hours.

## 2024-07-20 NOTE — PLAN OF CARE
Patient cleared for discharge from case management standpoint.    Chart and discharge orders reviewed.  Patient discharged home with no further case management needs.    Pt to resume HH with [facility] and son to transport pt home.      07/20/24 1447   Final Note   Assessment Type Final Discharge Note   Anticipated Discharge Disposition Home-Health   What phone number can be called within the next 1-3 days to see how you are doing after discharge? [phone]   Post-Acute Status   Discharge Delays None known at this time

## 2024-07-20 NOTE — DISCHARGE INSTRUCTIONS
Continue routine HD as before.  We stopped eye drops (Cosopt) for now and please follow up with your eye doctor for alternative medication as it was slowing down your heart beat.  Also do not take Metoprolol now. Dose of Amiodarone has been reduced by cardiologist.

## 2024-07-20 NOTE — DISCHARGE SUMMARY
[facility] Medicine  Discharge Summary      Patient Name: Tyra Isaac  MRN: [id number]  ROMMEL: [id number]  Patient Class: IP- Inpatient  Admission Date: 7/15/2024  Hospital Length of Stay: 4 days  Discharge Date and Time:  07/20/2024 11:19 AM  Attending Physician: Maurilio Rowe MD   Discharging Provider: Maurilio Rowe MD  Primary Care Provider: Kalpesh Goel MD    Primary Care Team: Networked reference to record PCT     HPI:   84 year old female with PMH significant for ESRD on HD MWF, A-fib on Eliquis, HTN, CVA presented to the ER with chief complaint of shortness of breath and weakness.  Patient reports that her last HD session was this past Friday 7/12/24.  Over the weekend, she began to feel progressively more short of breath with associated generalized weakness and fatigue.  She woke up short of breath this morning which prompted ER evaluation rather than going to her HD appointment.  Patient denies chest pain, palpitations, abdominal pain, vomiting.  Does endorse mild dizziness and nausea.  Denies syncope.  In the ER, patient found to be markedly bradycardic with HR 30-40s.  BP stable.  Mentation at baseline.  Troponin mildly elevated at 32.7 with repeat 31.8.  BNP elevated.  Electrolytes stable.  Patient was recently admitted to the hospital and went into SVT/afib RVR during dialysis; cardiology was consulted and she had a negative stress test and was discharged on amiodarone and metoprolol.  Will admit patient for further evaluation and treatment.     * No surgery found *      Hospital Course:   Patient monitored closely during hospitalization.  She was admitted for weakness and bradycardia.  Cardiology was consulted.  Initially oral amiodarone and metoprolol therapy were held. She was monitored on continuous telemetry.  Nephrology was consulted for her dialysis management. Cardiology consulted:  Cardiology team reduce amiodarone dose to 100 mg daily.  Metoprolol therapy was  discontinued.  With oral Cardizem patient's heart rate improved.  Anticoagulation therapy with Eliquis for paroxysmal atrial fibrillation has been continued.  Fall precautions again addressed with the patient.  Patient also recommended to hold use of timolol eyedrops follow-up with of the 4 alternate drop.  Patient was evaluated by PT and OT who recommended skilled nursing facility which patient has declined.  Patient agreeable to go home with home health and home physical therapy.  Patient's next hemodialysis treatment is on Monday.  Patient to maintain daily blood pressure and heart rate log.  Discharge plan of care reviewed with patient in detail who voiced understanding.  Patient has been cleared for discharge by Cardiology and Nephrology teams.  Home health services including registered nurse and home physical therapy has been arranged.    Goals of Care Treatment Preferences:  Code Status: DNR          What is most important right now is to focus on quality of life, even if it means sacrificing a little time, curative/life-prolongation (regardless of treatment burdens).  Accordingly, we have decided that the best plan to meet the patient's goals includes continuing with treatment.      Consults:   Consults (From admission, onward)          Status Ordering Provider     Inpatient consult to Nephrology  Once        Provider:  Rose Maynard MD    Completed CELESTE SIDHU     Inpatient consult to Cardiology  Once        Provider:  Phillip Mcgregor MD    Completed CELESTE SIDHU     Inpatient consult to Cardiology  Once        Provider:  Phillip Mcgregor MD    Completed EDDIE WOLF          Microbiology Results (last 7 days)       ** No results found for the last 168 hours. **          Imaging Results              X-Ray Chest AP Portable (Final result)  Result time 07/15/24 06:54:18      Final result by Phillip Magaña MD (07/15/24 06:54:18)                   Impression:      Ill-defined  bibasilar opacities may relate to atelectasis, aspiration, or pneumonia.      Electronically signed by: Phillip Magaña  Date:    07/15/2024  Time:    06:54               Narrative:    EXAMINATION:  XR CHEST AP PORTABLE    CLINICAL HISTORY:  Chest Pain;    TECHNIQUE:  Single frontal view of the chest was performed.    COMPARISON:  Chest radiograph performed 07/01/2024, 13:12 hours.    FINDINGS:  Monitoring leads overlie the chest.  Grossly unchanged cardiomediastinal contours.    Chronic interstitial coarsening is noted.    Ill-defined bibasilar opacities.    No definite pneumothorax or large volume pleural effusion.  No acute findings in the visualized abdomen.    Osseous and soft tissue structures appear without definite acute change.                                    Final Active Diagnoses:    Diagnosis Date Noted POA    PRINCIPAL PROBLEM:  Bradycardia [R00.1] 03/21/2023 Yes    Paroxysmal atrial fibrillation [I48.0] 12/09/2022 Yes    CAD s/p PCI [I25.10] 12/09/2022 Yes     Chronic    End stage renal disease on dialysis [N18.6, Z99.2] 06/25/2021 Not Applicable      Problems Resolved During this Admission:       Discharged Condition: good    Disposition: Home or Self Care    Follow Up:   Follow-up Information       Kalpesh Goel MD Follow up in 1 week(s).    Specialty: Internal Medicine  Contact information:  [address]  [address]  [place] [address]  [phone]               Phillip Mcgregor MD Follow up in 2 week(s).    Specialties: Cardiovascular Disease, Cardiology  Contact information:  [address]  [address]  [place] [address]  [phone]                           Patient Instructions:      Ambulatory referral/consult to Home Health   Standing Status: Future   Referral Priority: Routine Referral Type: Home Health Care   Referral Reason: Specialty Services Required   Requested Specialty: Home Health Services   Number of Visits Requested: 1     Diet Cardiac     Diet renal   Order Comments: Daily  fluid restriction 1.5 L daily     Notify your health care provider if you experience any of the following:  temperature >100.4     Notify your health care provider if you experience any of the following:  persistent nausea and vomiting or diarrhea     Notify your health care provider if you experience any of the following:  severe uncontrolled pain     Notify your health care provider if you experience any of the following:  redness, tenderness, or signs of infection (pain, swelling, redness, odor or green/yellow discharge around incision site)     Notify your health care provider if you experience any of the following:  difficulty breathing or increased cough     Notify your health care provider if you experience any of the following:  severe persistent headache     Notify your health care provider if you experience any of the following:  worsening rash     Notify your health care provider if you experience any of the following:  persistent dizziness, light-headedness, or visual disturbances     Notify your health care provider if you experience any of the following:  increased confusion or weakness     Activity as tolerated   Order Comments: Fall precautions       Significant Diagnostic Studies: Labs: CMP   Recent Labs   Lab 07/19/24  0442 07/20/24  0416    135*   K 5.5* 5.0    101   CO2 25 24   GLU 79 86   BUN 49* 33*   CREATININE 9.3* 7.1*   CALCIUM 9.6 9.6   PROT 7.1 7.6   ALBUMIN 3.8 4.1   BILITOT 0.4 0.4   ALKPHOS 72 79   AST 11 11   ALT 12 10   ANIONGAP 12 10   , CBC   Recent Labs   Lab 07/19/24  0442 07/20/24  0416   WBC 5.20 5.54   HGB 11.6* 12.1   HCT 39.2 41.3    269   , and INR   Lab Results   Component Value Date    INR 1.2 07/15/2024    INR 1.1 11/09/2023    INR 1.5 (H) 07/21/2023       Pending Diagnostic Studies:       None           Medications:  Reconciled Home Medications:      Medication List        START taking these medications      sodium zirconium cyclosilicate 10 gram  packet  Commonly known as: Lokelma  Take 1 packet (10 g total) by mouth every Tuesday, Thursday, Saturday, Sunday. Mix entire contents of packet(s) into drinking glass containing 3 tablespoons of water; stir well and drink immediately. Add water and repeat until no powder remains to receive entire dose.            CHANGE how you take these medications      amiodarone 100 MG Tab  Commonly known as: PACERONE  Take 1 tablet (100 mg total) by mouth once daily.  What changed:   medication strength  See the new instructions.            CONTINUE taking these medications      atorvastatin 40 MG tablet  Commonly known as: LIPITOR  Take 40 mg by mouth once daily.     b complex vitamins tablet  Take 1 tablet by mouth once daily.     ELIQUIS 2.5 mg Tab  Generic drug: apixaban  Take 1 tablet (2.5 mg total) by mouth 2 (two) times daily.     latanoprost 0.005 % ophthalmic solution  Place 1 drop into both eyes every evening.     metoclopramide HCl 5 MG tablet  Commonly known as: REGLAN  Take 1 tablet (5 mg total) by mouth 3 (three) times daily before meals.     midodrine 10 MG tablet  Commonly known as: PROAMATINE  Take 1 tablet (10 mg total) by mouth 3 (three) times daily with meals.     ondansetron 4 MG Tbdl  Commonly known as: ZOFRAN-ODT  Take 1 tablet (4 mg total) by mouth every 6 (six) hours as needed (nausea).            STOP taking these medications      dorzolamide-timolol 2-0.5% 22.3-6.8 mg/mL ophthalmic solution  Commonly known as: COSOPT     loperamide 2 mg Tab  Commonly known as: IMODIUM A-D     metoprolol tartrate 25 MG tablet  Commonly known as: LOPRESSOR              Indwelling Lines/Drains at time of discharge:   Lines/Drains/Airways       Drain  Duration                  Hemodialysis AV Fistula Right upper arm -- days                    Time spent on the discharge of patient: 33 minutes         Maurilio Rowe MD  Department of Hospital Medicine  [facility]

## 2024-07-22 ENCOUNTER — TELEPHONE (OUTPATIENT)
Dept: FAMILY MEDICINE | Facility: CLINIC | Age: 84
End: 2024-07-22
Payer: MEDICARE

## 2024-07-23 ENCOUNTER — TELEPHONE (OUTPATIENT)
Dept: FAMILY MEDICINE | Facility: CLINIC | Age: 84
End: 2024-07-23
Payer: MEDICARE

## 2024-07-23 ENCOUNTER — PATIENT OUTREACH (OUTPATIENT)
Dept: FAMILY MEDICINE | Facility: CLINIC | Age: 84
End: 2024-07-23
Payer: MEDICARE

## 2024-07-23 NOTE — TELEPHONE ENCOUNTER
Discharge Information     Discharge Date:   7/20/2024    Primary Discharge Diagnosis:  sob      Discharge Summary:  Reviewed      Medication & Order Review     Were medication changes made or new medications added?   Yes    If so, has the patient filled the prescriptions?  No     Was Home Health ordered? Yes    If so, has Home Health contacted patient and/or initiated services?  Yes    Name of Home Health Agency? N/A    Durable Medical Equipment ordered?  No     If so, has the DME provider contacted patient and delivered equipment?  N/A    Follow Up               Any problems since discharge? Yes    How is the patient feeling since returning home?  Pt is sob      Have you set up recommended follow up appointments?  Has appt made (cardiology, surgery, etc.)    Schedule Hospital Follow-up appointment within 7-14 days (preferably 7).      Notes:  sob but was moved and feels better per son              Kaci Celeste

## 2024-07-23 NOTE — TELEPHONE ENCOUNTER
----- Message from Renetta Owen LPN sent at 7/22/2024  1:55 PM CDT -----  Regarding: HFU  Call patient - needs post-hospital phone call within 2 business days and hospital follow up visit scheduled within 7-14 days.

## 2024-07-23 NOTE — TELEPHONE ENCOUNTER
----- Message from Kim Ortiz sent at 7/22/2024 11:59 AM CDT -----  Regarding: HFU  Please call patient - needs post-hospital phone call within 2 business days of discharge and hospital follow up visit scheduled within 7-14 days if not already scheduled. Patient scheduled for 7/30/24.    Thank You

## 2024-07-25 LAB
OHS QRS DURATION: 106 MS
OHS QRS DURATION: 110 MS
OHS QTC CALCULATION: 500 MS
OHS QTC CALCULATION: 594 MS

## 2024-07-27 NOTE — PROGRESS NOTES
[facility] Medicine  Progress Note    Patient Name: Tyra Isaac  MRN: [id number]  Patient Class: IP- Inpatient   Admission Date: 7/15/2024  Length of Stay: 4 days  Attending Physician: No att. providers found  Primary Care Provider: Kalpesh Goel MD        Subjective:     Principal Problem:Bradycardia        HPI:  84 year old female with PMH significant for ESRD on HD MWF, A-fib on Eliquis, HTN, CVA presented to the ER with chief complaint of shortness of breath and weakness.  Patient reports that her last HD session was this past Friday 7/12/24.  Over the weekend, she began to feel progressively more short of breath with associated generalized weakness and fatigue.  She woke up short of breath this morning which prompted ER evaluation rather than going to her HD appointment.  Patient denies chest pain, palpitations, abdominal pain, vomiting.  Does endorse mild dizziness and nausea.  Denies syncope.  In the ER, patient found to be markedly bradycardic with HR 30-40s.  BP stable.  Mentation at baseline.  Troponin mildly elevated at 32.7 with repeat 31.8.  BNP elevated.  Electrolytes stable.  Patient was recently admitted to the hospital and went into SVT/afib RVR during dialysis; cardiology was consulted and she had a negative stress test and was discharged on amiodarone and metoprolol.  Will admit patient for further evaluation and treatment.     Overview/Hospital Course:  Patient monitored closely during hospitalization.  She was admitted for weakness and bradycardia.  Cardiology was consulted.  Her home amiodarone and metoprolol were held.  She was monitored on continuous telemetry.  Nephrology was consulted for her dialysis management.    Cardiology consulted: Continue to hold metoprolol for now, restart low-dose amiodarone 100 mg daily - monitor heart rate closely.  Continuous telemetry, Continue Eliquis 2.5 mg BID for anticoagulation for PAF  Recommend stop eyedrops with timolol  and switch to non beta-blocker eyedrop. PT/OT was consulted and recommended SNF but patient declined, son agreeable to take her home with home health and we will likely process 7/20 as she finished dialysis late 7/19.      Review of Systems   Constitutional:  Positive for fatigue.   Respiratory:  Positive for shortness of breath.    Cardiovascular:  Negative for chest pain and palpitations.   Gastrointestinal:  Negative for abdominal pain and nausea.      Objective:      Vital Signs (Most Recent):  Temp: 98.5 °F (36.9 °C) (07/16/24 1215)  Pulse: (!) 58 (07/16/24 1356)  Resp: 16 (07/16/24 1215)  BP: (!) 124/50 (07/16/24 1215)  SpO2: 97 % (07/16/24 1356) Vital Signs (24h Range):  Temp:  [97.8 °F (36.6 °C)-98.5 °F (36.9 °C)] 98.5 °F (36.9 °C)  Pulse:  [40-61] 58  Resp:  [16-24] 16  SpO2:  [95 %-99 %] 97 %  BP: (106-190)/(42-66) 124/50      Weight: 46.3 kg (102 lb)  Body mass index is 16.97 kg/m².     Intake/Output Summary (Last 24 hours) at 7/16/2024 1420  Last data filed at 7/16/2024 0957      Gross per 24 hour   Intake 680 ml   Output 2500 ml   Net -1820 ml         Physical Exam  Vitals and nursing note reviewed.   Constitutional:       General: She is not in acute distress.  Cardiovascular:      Rate and Rhythm: Regular rhythm. Bradycardia present.   Pulmonary:      Effort: Pulmonary effort is normal. No respiratory distress.      Breath sounds: Normal breath sounds.   Abdominal:      Palpations: Abdomen is soft.      Tenderness: There is no abdominal tenderness.   Musculoskeletal:      Right lower leg: No edema.      Left lower leg: No edema.   Skin:     General: Skin is warm and dry.   Neurological:      Mental Status: She is alert and oriented to person, place, and time.                Significant Labs: All pertinent labs within the past 24 hours have been reviewed.  CBC:         Recent Labs   Lab 07/15/24  0559 07/15/24  0621 07/16/24  0420   WBC 6.28  --  6.24   HGB 11.4*  --  10.6*   HCT 39.4 36 36.3*   PLT  304  --  262      CMP:        Recent Labs   Lab 07/15/24  0559 07/16/24  0420    138   K 4.9 5.0   CL 96 104   CO2 32* 23    75   BUN 64* 34*   CREATININE 9.4* 6.8*   CALCIUM 9.8 9.0   PROT 7.7 6.9   ALBUMIN 4.2 3.6   BILITOT 0.5 0.5   ALKPHOS 81 75   AST 20 17   ALT 31 25   ANIONGAP 12 11      Magnesium:        Recent Labs   Lab 07/15/24  0559 07/16/24  0420   MG 2.3 2.2         Significant Imaging: I have reviewed all pertinent imaging results/findings within the past 24 hours.      Assessment/Plan:      * Bradycardia  Hemodynamically stable, alert   Cardiology consulted: Continue to hold metoprolol for now, restart low-dose amiodarone 100 mg daily - monitor heart rate closely.  Continuous telemetry, Continue Eliquis 2.5 mg BID for anticoagulation for PAF      CAD s/p PCI  Patient with known CAD s/p stent placement, which is controlled Will continue Statin and monitor for S/Sx of angina/ACS. Continue to monitor on telemetry.     Paroxysmal atrial fibrillation  Patient with atrial fibrillation which is controlled currently with Beta Blocker and Amiodarone. Patient is currently in sinus bradycardia.[id number] Score: 4. Anticoagulation indicated. Anticoagulation done with renally dosed Eliquis .  Heart rate is now within normal range, Cardiology has restarted amiodarone, continuing to hold beta-blocker due to soft pressures.  Cardiology consulted     End stage renal disease on dialysis  Creatine stable for now. BMP reviewed- noted Estimated Creatinine Clearance: 4.5 mL/min (A) (based on SCr of 6.8 mg/dL (H)). according to latest data. Based on current GFR, CKD stage is end stage.  Monitor UOP and serial BMP and adjust therapy as needed. Renally dose meds. Avoid nephrotoxic medications and procedures.  Nephrology consulted for hemodialysis management       VTE Risk Mitigation (From admission, onward)           Ordered     IP VTE HIGH RISK PATIENT  Once         07/15/24 0813                    Discharge  Planning   ILAN: 7/20/2024     Code Status: Prior   Is the patient medically ready for discharge?:     Reason for patient still in hospital (select all that apply): Treatment, Consult recommendations, PT / OT recommendations, and Pending disposition  Discharge Plan A: Home Health   Discharge Delays: None known at this time              German Michael DO  Department of Hospital Medicine   [facility]

## 2024-07-28 LAB
OHS QRS DURATION: 100 MS
OHS QRS DURATION: 110 MS
OHS QTC CALCULATION: 571 MS
OHS QTC CALCULATION: 610 MS

## 2024-07-30 ENCOUNTER — OFFICE VISIT (OUTPATIENT)
Dept: FAMILY MEDICINE | Facility: CLINIC | Age: 84
End: 2024-07-30
Payer: MEDICARE

## 2024-07-30 VITALS
BODY MASS INDEX: 16.5 KG/M2 | TEMPERATURE: 99 F | DIASTOLIC BLOOD PRESSURE: 52 MMHG | HEIGHT: 65 IN | SYSTOLIC BLOOD PRESSURE: 118 MMHG | RESPIRATION RATE: 20 BRPM | WEIGHT: 99 LBS

## 2024-07-30 DIAGNOSIS — E44.0 MODERATE PROTEIN-CALORIE MALNUTRITION: ICD-10-CM

## 2024-07-30 DIAGNOSIS — N18.6 STAGE 5 CHRONIC KIDNEY DISEASE ON CHRONIC DIALYSIS: ICD-10-CM

## 2024-07-30 DIAGNOSIS — R11.2 NAUSEA AND VOMITING, UNSPECIFIED VOMITING TYPE: ICD-10-CM

## 2024-07-30 DIAGNOSIS — Z59.9 FINANCIAL DIFFICULTIES: ICD-10-CM

## 2024-07-30 DIAGNOSIS — Z99.2 STAGE 5 CHRONIC KIDNEY DISEASE ON CHRONIC DIALYSIS: ICD-10-CM

## 2024-07-30 DIAGNOSIS — I48.0 PAROXYSMAL ATRIAL FIBRILLATION: ICD-10-CM

## 2024-07-30 DIAGNOSIS — I25.118 CORONARY ARTERY DISEASE OF NATIVE HEART WITH STABLE ANGINA PECTORIS, UNSPECIFIED VESSEL OR LESION TYPE: ICD-10-CM

## 2024-07-30 DIAGNOSIS — G31.84 MILD COGNITIVE IMPAIRMENT: ICD-10-CM

## 2024-07-30 DIAGNOSIS — R54 FRAIL ELDERLY: ICD-10-CM

## 2024-07-30 DIAGNOSIS — Z78.9 POOR PROGNOSIS: ICD-10-CM

## 2024-07-30 DIAGNOSIS — Z09 HOSPITAL DISCHARGE FOLLOW-UP: ICD-10-CM

## 2024-07-30 DIAGNOSIS — I49.8 BRADYARRHYTHMIA: Primary | ICD-10-CM

## 2024-07-30 PROCEDURE — 99999 PR PBB SHADOW E&M-EST. PATIENT-LVL III: CPT | Mod: PBBFAC,HCNC,, | Performed by: INTERNAL MEDICINE

## 2024-07-30 SDOH — SOCIAL DETERMINANTS OF HEALTH (SDOH): PROBLEM RELATED TO HOUSING AND ECONOMIC CIRCUMSTANCES, UNSPECIFIED: Z59.9

## 2024-07-30 NOTE — PROGRESS NOTES
Subjective:       Patient ID: Tyra Isaac is a 84 y.o. female.    Chief Complaint: Hospital Follow Up, Cardiac Arrhythmias, and Chronic Kidney Disease      Patient is a chronically ill  female who has end-stage kidney disease and is on chronic hemodialysis.  She was gradually and steadily declining over months in years with increasingly more hospitalizations.    She had   Recent hospitalization initially with Jaya arrhythmias. Prior to that she was put on amiodarone and beta-blockers and not sure if she had replicated or Multaq.  The train of events are not clear at this point.    Summary of recent hospital stay has been copied and pasted here for easy reference.          Primary Care Team: Networked reference to record PCT      HPI:   84 year old female with PMH significant for ESRD on HD MWF, A-fib on Eliquis, HTN, CVA presented to the ER with chief complaint of shortness of breath and weakness.  Patient reports that her last HD session was this past Friday 7/12/24.  Over the weekend, she began to feel progressively more short of breath with associated generalized weakness and fatigue.  She woke up short of breath this morning which prompted ER evaluation rather than going to her HD appointment.  Patient denies chest pain, palpitations, abdominal pain, vomiting.  Does endorse mild dizziness and nausea.  Denies syncope.  In the ER, patient found to be markedly bradycardic with HR 30-40s.  BP stable.  Mentation at baseline.  Troponin mildly elevated at 32.7 with repeat 31.8.  BNP elevated.  Electrolytes stable.  Patient was recently admitted to the hospital and went into SVT/afib RVR during dialysis; cardiology was consulted and she had a negative stress test and was discharged on amiodarone and metoprolol.  Will admit patient for further evaluation and treatment.      * No surgery found *       Hospital Course:   Patient monitored closely during hospitalization.  She was admitted for  weakness and bradycardia.  Cardiology was consulted.  Initially oral amiodarone and metoprolol therapy were held. She was monitored on continuous telemetry.  Nephrology was consulted for her dialysis management. Cardiology consulted:  Cardiology team reduce amiodarone dose to 100 mg daily.  Metoprolol therapy was discontinued.  With oral Cardizem patient's heart rate improved.  Anticoagulation therapy with Eliquis for paroxysmal atrial fibrillation has been continued.  Fall precautions again addressed with the patient.  Patient also recommended to hold use of timolol eyedrops follow-up with of the 4 alternate drop.  Patient was evaluated by PT and OT who recommended skilled nursing facility which patient has declined.  Patient agreeable to go home with home health and home physical therapy.  Patient's next hemodialysis treatment is on Monday.  Patient to maintain daily blood pressure and heart rate log.  Discharge plan of care reviewed with patient in detail who voiced understanding.  Patient has been cleared for discharge by Cardiology and Nephrology teams.  Home health services including registered nurse and home physical therapy has been arranged.     Goals of Care Treatment Preferences:  Code Status: DNR     What is most important right now is to focus on quality of life, even if it means sacrificing a little time, curative/life-prolongation (regardless of treatment burdens).  Accordingly, we have decided that the best plan to meet the patient's goals includes continuing with treatment.      Her  underlying medical issues are as below:  -.    1. Paroxysmal atrial fibrillation -rapid ventricular response and recent hospitalization subsequently Jaya arrhythmias  2. Benign hypertension with ESRD (end-stage renal disease)   3. Multiple-type hyperlipidemia   4. Stage 5 chronic kidney disease on chronic dialysis   5. Chronic anticoagulation   6. Coronary artery disease of native heart with stable angina pectoris,  unspecified vessel or lesion type   7. Other gastritis without hemorrhage, unspecified chronicity   8. History of syncope   9.          Cognitive changes  10.        General debility and poor performance      11.        Chronic nausea and vomiting suspected gastroparesis              Transitional care management        1.-Educate the family, guardian or caregiver.  Family and or caretaker present at visit.      Reviewed medications and advancing state of her medical illnesses.      2.-Review the patient's need for, or follow-up on, diagnostic tests and treatments.  Diagnostic tests reviewed/disposition      Continue to follow up with the dialysis.      3.-review discharge information.  (for example, discharge summary or continuity of care documents). Disease/illness education      Continue to follow Cardiology.        4.-Help schedule required community providers and services follow-up.  Home health/community services discussion/referrals.      Already has follow up with the dialysis and Cardiology.  New appointment with GI service.      5.-establish or reestablish referrals and arranged needed community resources.  Establishment or reestablishment of referral orders for community resources.    Referral to GI Services.        6..-interact with other healthcare professionals who may assume or reassume care of the patient's system-specific problems.  Discuss with other healthcare providers.     Will need to check with GI concerning potential gastroparesis and persistent nausea and vomiting which has been unanswered.            Hypertension  This is a chronic problem. The current episode started more than 1 year ago. The problem is controlled. Associated symptoms include chest pain, malaise/fatigue and shortness of breath. Pertinent negatives include no headaches, neck pain, palpitations or peripheral edema. Risk factors for coronary artery disease include sedentary lifestyle, dyslipidemia and post-menopausal state.  Past treatments include calcium channel blockers. The current treatment provides moderate improvement. Compliance problems include psychosocial issues.  Hypertensive end-organ damage includes kidney disease and CAD/MI. Identifiable causes of hypertension include chronic renal disease.   Hyperlipidemia  This is a chronic problem. The current episode started more than 1 year ago. The problem is controlled. Exacerbating diseases include chronic renal disease. She has no history of hypothyroidism or obesity. Associated symptoms include chest pain, myalgias and shortness of breath. Current antihyperlipidemic treatment includes statins. The current treatment provides moderate improvement of lipids. Risk factors for coronary artery disease include a sedentary lifestyle and dyslipidemia.   Atrial Fibrillation  Presents for follow-up (recent hospitalization for bradyarrhythmia) visit. Symptoms include bradycardia, chest pain, hemodynamic instability, hypertension, shortness of breath and weakness. Symptoms are negative for dizziness, pacemaker problem, palpitations, syncope and tachycardia. The symptoms have been stable. Medication compliance problems include psychosocial issues.   Coronary Artery Disease  Presents for follow-up visit. Symptoms include chest pain and shortness of breath. Pertinent negatives include no chest tightness, dizziness, leg swelling or palpitations. Risk factors include hypertension. Risk factors do not include obesity. The symptoms have been stable. Compliance with diet is variable. Compliance with exercise is poor. Compliance with medications is good.       Past Medical History:   Diagnosis Date    A-fib     Anxiety     Depression     Disorder of kidney and ureter     Encephalopathy acute 1/1/2018    End stage kidney disease 6/17/2017    Gout     Hyperlipidemia     Hypertension     Moderate episode of recurrent major depressive disorder 1/17/2018    Nephropathy hypertensive, stage 5 chronic kidney  disease or end stage renal disease 6/17/2017    Obstructive pattern present on pulmonary function testing 7/28/2021    Shows moderate obstruction.    Osteopenia of multiple sites 3/9/2018    Based upon bone density measurements. Patient also has chronic kidney disease.    Stroke 11/2016     Social History     Socioeconomic History    Marital status:      Spouse name: Aria Isaac    Number of children: 3   Occupational History    Occupation: Not working   Tobacco Use    Smoking status: Never    Smokeless tobacco: Never   Substance and Sexual Activity    Alcohol use: No    Drug use: No    Sexual activity: Not Currently     Social Determinants of Health     Financial Resource Strain: Medium Risk (7/11/2024)    Overall Financial Resource Strain (CARDIA)     Difficulty of Paying Living Expenses: Somewhat hard   Food Insecurity: No Food Insecurity (7/11/2024)    Hunger Vital Sign     Worried About Running Out of Food in the Last Year: Never true     Ran Out of Food in the Last Year: Never true   Transportation Needs: No Transportation Needs (7/11/2024)    TRANSPORTATION NEEDS     Transportation : No   Physical Activity: Inactive (6/15/2022)    Exercise Vital Sign     Days of Exercise per Week: 0 days     Minutes of Exercise per Session: 0 min   Stress: Stress Concern Present (7/11/2024)    [employer] [place] [employer] - Occupational Stress Questionnaire     Feeling of Stress : To some extent   Housing Stability: Patient Declined (6/30/2024)    Housing Stability Vital Sign     Unable to Pay for Housing in the Last Year: Patient declined     Homeless in the Last Year: Patient declined     Past Surgical History:   Procedure Laterality Date    ANGIOGRAM, CORONARY, WITH LEFT HEART CATHETERIZATION N/A 2/7/2022    Procedure: Angiogram, Coronary, with Left Heart Cath;  Surgeon: Gino Leal MD;  Location: [facility] CATH/EP LAB;  Service: Cardiology;  Laterality: N/A;    CARDIAC SURGERY      stents     "EYE SURGERY      WRIST SURGERY       Family History   Problem Relation Name Age of Onset    Heart disease Mother      Cancer Father         Review of Systems   Constitutional:  Positive for activity change, appetite change, fatigue and malaise/fatigue.   HENT:  Negative for congestion, sinus pain and sneezing.    Eyes:  Negative for discharge and redness.   Respiratory:  Positive for shortness of breath. Negative for apnea, chest tightness and wheezing.    Cardiovascular:  Positive for chest pain. Negative for palpitations, leg swelling and syncope.   Gastrointestinal:  Positive for abdominal distention, nausea and vomiting. Negative for blood in stool.   Endocrine: Negative for cold intolerance, polydipsia and polyuria.   Genitourinary:  Negative for difficulty urinating, flank pain and pelvic pain.   Musculoskeletal:  Positive for gait problem and myalgias. Negative for neck pain.   Neurological:  Positive for weakness. Negative for dizziness, seizures, syncope, speech difficulty and headaches.   Psychiatric/Behavioral:  Positive for decreased concentration and sleep disturbance. The patient is nervous/anxious.          Objective:      Blood pressure (!) 118/52, pulse (P) 65, temperature 98.6 °F (37 °C), resp. rate 20, height 5' 5" (1.651 m), weight 44.9 kg (99 lb). Body mass index is 16.47 kg/m².  Physical Exam  Vitals and nursing note reviewed.   Constitutional:       General: She is not in acute distress.     Appearance: She is well-developed. She is ill-appearing. She is not toxic-appearing or diaphoretic.      Comments: BMI 16.47 somewhat thin built and chronically ill appearing.    HENT:      Head: Normocephalic and atraumatic.   Eyes:      General: No scleral icterus.  Neck:      Thyroid: No thyromegaly.      Vascular: No JVD.      Trachea: Trachea normal. No tracheal deviation.   Cardiovascular:      Rate and Rhythm: Normal rate. Rhythm irregular.      Heart sounds: S1 normal and S2 normal. Murmur heard. "      Systolic murmur is present with a grade of 2/6.      No friction rub.      Comments:  Previous cardiologist Dr. Thompson.  Current cardiologist to be confirmed  Pulmonary:      Effort: No respiratory distress.      Comments: Diminished air entry and some harsh conducted sounds.  No definite Creps or rhonchi.  Abdominal:      General: There is no distension.      Palpations: Abdomen is soft. Abdomen is not rigid.      Tenderness: There is no abdominal tenderness. There is no guarding.   Musculoskeletal:         General: No tenderness or deformity.        Arms:       Cervical back: Neck supple.      Right lower leg: No edema.      Left lower leg: No edema.   Lymphadenopathy:      Cervical: No cervical adenopathy.   Skin:     General: Skin is warm.      Coloration: Skin is not pale.      Findings: No bruising or rash.      Comments: Rt arm AV shunt with thrill   Neurological:      Mental Status: She is alert. Mental status is at baseline.      Coordination: Coordination normal.   Psychiatric:         Mood and Affect: Affect is not labile.         Speech: Speech normal.         Behavior: Behavior normal. Behavior is cooperative.         Cognition and Memory: Cognition is impaired (Difficulty with details of her medications and issues).         Judgment: Judgment is not inappropriate.           Assessment:       No results displayed because visit has over 200 results.      No results displayed because visit has over 200 results.      Admission on 06/03/2024, Discharged on 06/03/2024   Component Date Value Ref Range Status    WBC 06/03/2024 7.00  3.90 - 12.70 K/uL Final    RBC 06/03/2024 4.43  4.00 - 5.40 M/uL Final    Hemoglobin 06/03/2024 11.8 (L)  12.0 - 16.0 g/dL Final    Hematocrit 06/03/2024 40.2  37.0 - 48.5 % Final    MCV 06/03/2024 91  82 - 98 fL Final    MCH 06/03/2024 26.6 (L)  27.0 - 31.0 pg Final    MCHC 06/03/2024 29.4 (L)  32.0 - 36.0 g/dL Final    RDW 06/03/2024 18.2 (H)  11.5 - 14.5 % Final     Platelets 06/03/2024 226  150 - 450 K/uL Final    MPV 06/03/2024 9.7  9.2 - 12.9 fL Final    Immature Granulocytes 06/03/2024 0.3  0.0 - 0.5 % Final    Gran # (ANC) 06/03/2024 3.9  1.8 - 7.7 K/uL Final    Immature Grans (Abs) 06/03/2024 0.02  0.00 - 0.04 K/uL Final    Lymph # 06/03/2024 1.4  1.0 - 4.8 K/uL Final    Mono # 06/03/2024 1.1 (H)  0.3 - 1.0 K/uL Final    Eos # 06/03/2024 0.6 (H)  0.0 - 0.5 K/uL Final    Baso # 06/03/2024 0.02  0.00 - 0.20 K/uL Final    nRBC 06/03/2024 0  0 /100 WBC Final    Gran % 06/03/2024 55.4  38.0 - 73.0 % Final    Lymph % 06/03/2024 19.3  18.0 - 48.0 % Final    Mono % 06/03/2024 16.1 (H)  4.0 - 15.0 % Final    Eosinophil % 06/03/2024 8.6 (H)  0.0 - 8.0 % Final    Basophil % 06/03/2024 0.3  0.0 - 1.9 % Final    Differential Method 06/03/2024 Automated   Final    Sodium 06/03/2024 142  136 - 145 mmol/L Final    Potassium 06/03/2024 3.7  3.5 - 5.1 mmol/L Final    Chloride 06/03/2024 98  95 - 110 mmol/L Final    CO2 06/03/2024 32 (H)  23 - 29 mmol/L Final    Glucose 06/03/2024 62 (L)  70 - 110 mg/dL Final    BUN 06/03/2024 34 (H)  8 - 23 mg/dL Final    Creatinine 06/03/2024 6.0 (H)  0.5 - 1.4 mg/dL Final    Calcium 06/03/2024 9.0  8.7 - 10.5 mg/dL Final    Total Protein 06/03/2024 7.7  6.0 - 8.4 g/dL Final    Albumin 06/03/2024 4.4  3.5 - 5.2 g/dL Final    Total Bilirubin 06/03/2024 0.7  0.1 - 1.0 mg/dL Final    Alkaline Phosphatase 06/03/2024 63  55 - 135 U/L Final    AST 06/03/2024 28  10 - 40 U/L Final    ALT 06/03/2024 13  10 - 44 U/L Final    eGFR 06/03/2024 6.5 (A)  >60 mL/min/1.73 m^2 Final    Anion Gap 06/03/2024 12  8 - 16 mmol/L Final    Magnesium 06/03/2024 2.0  1.6 - 2.6 mg/dL Final    QRS Duration 06/03/2024 76  ms Final    [facility] QTC Calculation 06/03/2024 524  ms Final    Specimen UA 06/03/2024 Urine, Clean Catch   Final    Color, UA 06/03/2024 Yellow  Yellow, Straw, Mckayla Final    Appearance, UA 06/03/2024 Clear  Clear Final    pH, UA 06/03/2024 8.0  5.0 - 8.0 Final     Specific Gravity, UA 06/03/2024 1.010  1.005 - 1.030 Final    Protein, UA 06/03/2024 Negative  Negative Final    Glucose, UA 06/03/2024 Negative  Negative Final    Ketones, UA 06/03/2024 Negative  Negative Final    Bilirubin (UA) 06/03/2024 Negative  Negative Final    Occult Blood UA 06/03/2024 Negative  Negative Final    Nitrite, UA 06/03/2024 Negative  Negative Final    Urobilinogen, UA 06/03/2024 Negative  Negative EU/dL Final    Leukocytes, UA 06/03/2024 Negative  Negative Final    POC Glucose 06/03/2024 71  70 - 110 Final    POC Glucose 06/03/2024 65 (L)  70 - 110 Final    POC Glucose 06/03/2024 107  70 - 110 Final       1. Bradyarrhythmia  Comments:  Initially admitted with tachyarrhythmia and subsequently Jaya arrhythmia due to amiodarone    2. Stage 5 chronic kidney disease on chronic dialysis  Comments:  continues on dialysis on Monday, Wednesday and Friday. not a candidate for transplant.    3. Paroxysmal atrial fibrillation    4. Coronary artery disease of native heart with stable angina pectoris, unspecified vessel or lesion type    5. Moderate protein-calorie malnutrition    6. Mild cognitive impairment    7. Financial difficulties    8. Nausea and vomiting, unspecified vomiting type  Comments:  Likely gastroparesis.  Not sure if it is uremic  Orders:  -     Ambulatory referral/consult to Gastroenterology; Future; Expected date: 08/06/2024    9. Hospital discharge follow-up    10. Frail elderly    11. Poor prognosis             Plan:   Bradyarrhythmia  Comments:  Initially admitted with tachyarrhythmia and subsequently Jaya arrhythmia due to amiodarone    Stage 5 chronic kidney disease on chronic dialysis  Comments:  continues on dialysis on Monday, Wednesday and Friday. not a candidate for transplant.    Paroxysmal atrial fibrillation    Coronary artery disease of native heart with stable angina pectoris, unspecified vessel or lesion type    Moderate protein-calorie malnutrition    Mild cognitive  impairment    Financial difficulties    Nausea and vomiting, unspecified vomiting type  Comments:  Likely gastroparesis.  Not sure if it is uremic  Orders:  -     Ambulatory referral/consult to Gastroenterology; Future; Expected date: 08/06/2024    Hospital discharge follow-up    Frail elderly    Poor prognosis     Patient's recent hospitalization for bradyarrhythmia following a prior hospitalization for tachyarrhythmia where she was aggressively treated with amiodarone at a higher dose was noted.  Subsequently amiodarone dosage has been cut down especially given her chronic kidney disease.    She continues to feel poorly overall though not as bad as when she went to the hospital 2nd time.    Overall quality of life is marginal.  I did have a talk with the patient's son and generally he is very astute of the fact that his mother's condition with frequent hospitalization is not that great.      While hospice in palliative care is certainly an option, the fact that dialysis may not be continue with that option is at this point not acceptable to them.      Patient's insight and understanding might be somewhat more constricted as to what is going on but will try to keep engaged and hopefully during 1 of those lucid movements, we may get a little more insight as to what she thinks about her frequent hospitalization and if she has a feeling that she was getting better or not.  As far has a persistent nausea is concerned, I feel that she might have gastroparesis.  She does not recall any GI that she was seen.  I do not have any GI workup records in the epic system currently and it is unclear if she had seen a GI in past or had a colonoscopy.  She states that she never had a colonoscopy.  Again her memory and recall might not be reliable.      Nothing else significant to be offered at this point except for required preventive care measures, fall precautions and perhaps any other social support as needed.  It was likely that  she will be visiting the hospital frequently in future.    I will try to review the other records and probably give a consultation to GI Services.  Follow up in about 5 weeks (around 9/4/2024), or if symptoms worsen or fail to improve, for Hypertension/lipids.      Current Outpatient Medications:     amiodarone (PACERONE) 100 MG Tab, Take 1 tablet (100 mg total) by mouth once daily., Disp: 30 tablet, Rfl: 0    atorvastatin (LIPITOR) 40 MG tablet, Take 40 mg by mouth once daily., Disp: , Rfl:     b complex vitamins tablet, Take 1 tablet by mouth once daily., Disp: , Rfl:     ELIQUIS 2.5 mg Tab, Take 1 tablet (2.5 mg total) by mouth 2 (two) times daily., Disp: 60 tablet, Rfl: 0    latanoprost 0.005 % ophthalmic solution, Place 1 drop into both eyes every evening., Disp: , Rfl:     metoclopramide HCl (REGLAN) 5 MG tablet, Take 1 tablet (5 mg total) by mouth 3 (three) times daily before meals., Disp: 30 tablet, Rfl: 1    midodrine (PROAMATINE) 10 MG tablet, Take 1 tablet (10 mg total) by mouth 3 (three) times daily with meals., Disp: 270 tablet, Rfl: 0    ondansetron (ZOFRAN-ODT) 4 MG TbDL, Take 1 tablet (4 mg total) by mouth every 6 (six) hours as needed (nausea)., Disp: 30 tablet, Rfl: 5    sodium zirconium cyclosilicate (LOKELMA) 10 gram packet, Take 1 packet (10 g total) by mouth every Tuesday, Thursday, Saturday, Sunday. Mix entire contents of packet(s) into drinking glass containing 3 tablespoons of water; stir well and drink immediately. Add water and repeat until no powder remains to receive entire dose. (Patient not taking: Reported on 7/30/2024), Disp: 16 packet, Rfl: 0    Kalpesh Goel

## 2024-08-08 ENCOUNTER — DOCUMENT SCAN (OUTPATIENT)
Dept: HOME HEALTH SERVICES | Facility: HOSPITAL | Age: 84
End: 2024-08-08
Payer: MEDICARE

## 2024-08-08 ENCOUNTER — EXTERNAL HOME HEALTH (OUTPATIENT)
Dept: HOME HEALTH SERVICES | Facility: HOSPITAL | Age: 84
End: 2024-08-08
Payer: MEDICARE

## 2024-08-09 ENCOUNTER — HOSPITAL ENCOUNTER (EMERGENCY)
Facility: HOSPITAL | Age: 84
Discharge: HOME | End: 2024-08-09
Attending: STUDENT IN AN ORGANIZED HEALTH CARE EDUCATION/TRAINING PROGRAM
Payer: MEDICARE

## 2024-08-09 VITALS
WEIGHT: 102 LBS | SYSTOLIC BLOOD PRESSURE: 144 MMHG | OXYGEN SATURATION: 98 % | DIASTOLIC BLOOD PRESSURE: 68 MMHG | HEART RATE: 68 BPM | RESPIRATION RATE: 17 BRPM | HEIGHT: 65 IN | TEMPERATURE: 98 F | BODY MASS INDEX: 17 KG/M2

## 2024-08-09 DIAGNOSIS — R03.0 ELEVATED BLOOD PRESSURE READING: ICD-10-CM

## 2024-08-09 DIAGNOSIS — J18.9 PNEUMONIA DUE TO INFECTIOUS ORGANISM, UNSPECIFIED LATERALITY, UNSPECIFIED PART OF LUNG: Primary | ICD-10-CM

## 2024-08-09 LAB
ALBUMIN SERPL BCP-MCNC: 4.4 G/DL (ref 3.5–5.2)
ALP SERPL-CCNC: 74 U/L (ref 55–135)
ALT SERPL W/O P-5'-P-CCNC: 12 U/L (ref 10–44)
ANION GAP SERPL CALC-SCNC: 9 MMOL/L (ref 8–16)
AST SERPL-CCNC: 15 U/L (ref 10–40)
BASOPHILS # BLD AUTO: 0.03 K/UL (ref 0–0.2)
BASOPHILS NFR BLD: 0.6 % (ref 0–1.9)
BILIRUB SERPL-MCNC: 0.6 MG/DL (ref 0.1–1)
BNP SERPL-MCNC: 1130 PG/ML (ref 0–99)
BUN SERPL-MCNC: 14 MG/DL (ref 8–23)
CALCIUM SERPL-MCNC: 9.1 MG/DL (ref 8.7–10.5)
CHLORIDE SERPL-SCNC: 94 MMOL/L (ref 95–110)
CO2 SERPL-SCNC: 36 MMOL/L (ref 23–29)
CREAT SERPL-MCNC: 4.2 MG/DL (ref 0.5–1.4)
DIFFERENTIAL METHOD BLD: ABNORMAL
EOSINOPHIL # BLD AUTO: 0.5 K/UL (ref 0–0.5)
EOSINOPHIL NFR BLD: 11 % (ref 0–8)
ERYTHROCYTE [DISTWIDTH] IN BLOOD BY AUTOMATED COUNT: 21.3 % (ref 11.5–14.5)
EST. GFR  (NO RACE VARIABLE): 9.9 ML/MIN/1.73 M^2
GLUCOSE SERPL-MCNC: 83 MG/DL (ref 70–110)
HCT VFR BLD AUTO: 41.6 % (ref 37–48.5)
HGB BLD-MCNC: 11.9 G/DL (ref 12–16)
IMM GRANULOCYTES # BLD AUTO: 0.01 K/UL (ref 0–0.04)
IMM GRANULOCYTES NFR BLD AUTO: 0.2 % (ref 0–0.5)
INFLUENZA A, MOLECULAR: NEGATIVE
INFLUENZA B, MOLECULAR: NEGATIVE
LYMPHOCYTES # BLD AUTO: 1.2 K/UL (ref 1–4.8)
LYMPHOCYTES NFR BLD: 24.4 % (ref 18–48)
MAGNESIUM SERPL-MCNC: 1.8 MG/DL (ref 1.6–2.6)
MCH RBC QN AUTO: 25.4 PG (ref 27–31)
MCHC RBC AUTO-ENTMCNC: 28.6 G/DL (ref 32–36)
MCV RBC AUTO: 89 FL (ref 82–98)
MONOCYTES # BLD AUTO: 0.8 K/UL (ref 0.3–1)
MONOCYTES NFR BLD: 16.3 % (ref 4–15)
NEUTROPHILS # BLD AUTO: 2.2 K/UL (ref 1.8–7.7)
NEUTROPHILS NFR BLD: 47.5 % (ref 38–73)
NRBC BLD-RTO: 0 /100 WBC
PHOSPHATE SERPL-MCNC: 1.7 MG/DL (ref 2.7–4.5)
PLATELET # BLD AUTO: 261 K/UL (ref 150–450)
PMV BLD AUTO: 9.8 FL (ref 9.2–12.9)
POTASSIUM SERPL-SCNC: 3.4 MMOL/L (ref 3.5–5.1)
PROT SERPL-MCNC: 8.4 G/DL (ref 6–8.4)
RBC # BLD AUTO: 4.68 M/UL (ref 4–5.4)
SARS-COV-2 RDRP RESP QL NAA+PROBE: NEGATIVE
SODIUM SERPL-SCNC: 139 MMOL/L (ref 136–145)
SPECIMEN SOURCE: NORMAL
TROPONIN I SERPL HS-MCNC: 34.9 PG/ML (ref 0–14.9)
WBC # BLD AUTO: 4.72 K/UL (ref 3.9–12.7)

## 2024-08-09 PROCEDURE — 83735 ASSAY OF MAGNESIUM: CPT | Performed by: NURSE PRACTITIONER

## 2024-08-09 PROCEDURE — 96367 TX/PROPH/DG ADDL SEQ IV INF: CPT

## 2024-08-09 PROCEDURE — 83880 ASSAY OF NATRIURETIC PEPTIDE: CPT | Performed by: NURSE PRACTITIONER

## 2024-08-09 PROCEDURE — 96375 TX/PRO/DX INJ NEW DRUG ADDON: CPT

## 2024-08-09 PROCEDURE — U0002 COVID-19 LAB TEST NON-CDC: HCPCS | Performed by: STUDENT IN AN ORGANIZED HEALTH CARE EDUCATION/TRAINING PROGRAM

## 2024-08-09 PROCEDURE — 36415 COLL VENOUS BLD VENIPUNCTURE: CPT | Performed by: NURSE PRACTITIONER

## 2024-08-09 PROCEDURE — 96365 THER/PROPH/DIAG IV INF INIT: CPT

## 2024-08-09 PROCEDURE — 85025 COMPLETE CBC W/AUTO DIFF WBC: CPT | Performed by: NURSE PRACTITIONER

## 2024-08-09 PROCEDURE — 84100 ASSAY OF PHOSPHORUS: CPT | Performed by: NURSE PRACTITIONER

## 2024-08-09 PROCEDURE — 25000003 PHARM REV CODE 250: Performed by: STUDENT IN AN ORGANIZED HEALTH CARE EDUCATION/TRAINING PROGRAM

## 2024-08-09 PROCEDURE — 80053 COMPREHEN METABOLIC PANEL: CPT | Performed by: NURSE PRACTITIONER

## 2024-08-09 PROCEDURE — 63600175 PHARM REV CODE 636 W HCPCS: Performed by: STUDENT IN AN ORGANIZED HEALTH CARE EDUCATION/TRAINING PROGRAM

## 2024-08-09 PROCEDURE — 96366 THER/PROPH/DIAG IV INF ADDON: CPT

## 2024-08-09 PROCEDURE — 84484 ASSAY OF TROPONIN QUANT: CPT | Performed by: NURSE PRACTITIONER

## 2024-08-09 PROCEDURE — 99285 EMERGENCY DEPT VISIT HI MDM: CPT | Mod: 25

## 2024-08-09 PROCEDURE — 87502 INFLUENZA DNA AMP PROBE: CPT | Performed by: STUDENT IN AN ORGANIZED HEALTH CARE EDUCATION/TRAINING PROGRAM

## 2024-08-09 RX ORDER — DOXYCYCLINE 100 MG/1
100 CAPSULE ORAL 2 TIMES DAILY
Qty: 20 CAPSULE | Refills: 0 | Status: SHIPPED | OUTPATIENT
Start: 2024-08-09 | End: 2024-08-09

## 2024-08-09 RX ORDER — HYDRALAZINE HYDROCHLORIDE 20 MG/ML
10 INJECTION INTRAMUSCULAR; INTRAVENOUS
Status: COMPLETED | OUTPATIENT
Start: 2024-08-09 | End: 2024-08-09

## 2024-08-09 RX ORDER — DOXYCYCLINE 100 MG/1
100 CAPSULE ORAL 2 TIMES DAILY
Qty: 20 CAPSULE | Refills: 0 | Status: SHIPPED | OUTPATIENT
Start: 2024-08-09 | End: 2024-08-19

## 2024-08-09 RX ADMIN — HYDRALAZINE HYDROCHLORIDE 10 MG: 20 INJECTION INTRAMUSCULAR; INTRAVENOUS at 02:08

## 2024-08-09 RX ADMIN — AZITHROMYCIN DIHYDRATE 500 MG: 500 INJECTION, POWDER, LYOPHILIZED, FOR SOLUTION INTRAVENOUS at 04:08

## 2024-08-09 RX ADMIN — CEFTRIAXONE SODIUM 1 G: 1 INJECTION, POWDER, FOR SOLUTION INTRAMUSCULAR; INTRAVENOUS at 03:08

## 2024-08-09 NOTE — ED PROVIDER NOTES
Encounter Date: 8/9/2024       History     Chief Complaint   Patient presents with    Cough     Pt coughing up phlegm for a couple weeks    Hypertension     Pt sent after dialysis for hypertension     HPI    Tyra Isaac is a 84 y.o. female with a past medical history of atrial fibrillation currently on Eliquis, ESRD on HD Monday Wednesday Friday, hypertension, and previous CVA that presents emergency department for shortness of breath, coughing, and elevated blood pressure.  Patient had a recent hospitalization for an episode of bradycardia.  She was thought to have marked bradycardia potentially secondary to metoprolol.  Blood pressure medications were discontinued.  Ultimately discharged.  Was having a cough with shortness of breath times approximately 1 week.  Cough is nonproductive.  She was at dialysis today and reportedly had a full session.  Following this, she went to the emergency department due to elevated blood pressure.  She also has been feeling unwell for several days..     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    Osteopenia of multiple sites 3/9/2018    Based upon bone density measurements. Patient also has chronic kidney disease.    Stroke 11/2016     Past Surgical History:   Procedure Laterality Date    ANGIOGRAM, CORONARY, WITH LEFT HEART CATHETERIZATION N/A 2/7/2022     Procedure: Angiogram, Coronary, with Left Heart Cath;  Surgeon: Gino Leal MD;  Location: [facility] CATH/EP LAB;  Service: Cardiology;  Laterality: N/A;    CARDIAC SURGERY      stents    EYE SURGERY      WRIST SURGERY       Family History   Problem Relation Name Age of Onset    Heart disease Mother      Cancer Father       Social History     Tobacco Use    Smoking status: Never    Smokeless tobacco: Never   Substance Use Topics    Alcohol use: No    Drug use: No     Review of Systems   Constitutional:  Positive for fatigue. Negative for fever.   HENT:  Negative for sore throat.    Respiratory:  Positive for cough and shortness of breath.    Cardiovascular:  Negative for chest pain.   Gastrointestinal:  Negative for abdominal pain, diarrhea, nausea and vomiting.   Genitourinary:  Negative for dysuria.   Musculoskeletal:  Negative for back pain.   Skin:  Negative for rash.   Neurological:  Negative for weakness.   Hematological:  Does not bruise/bleed easily.       Physical Exam     Initial Vitals [08/09/24 1154]   BP Pulse Resp Temp SpO2   (!) 195/86 60 18 98.1 °F (36.7 °C) 100 %      MAP       --         Physical Exam    Nursing note and vitals reviewed.  Constitutional: She appears well-developed and well-nourished.   Fatigue appearing.    HENT:   Head: Normocephalic and atraumatic.   Eyes: EOM are normal. Pupils are equal, round, and reactive to light.   Neck:   Normal range of motion.  Cardiovascular:  Normal rate, regular rhythm and normal heart sounds.           Pulmonary/Chest: Breath sounds normal. No respiratory distress. She has no wheezes. She has no rhonchi. She has no rales.   Speaking in complete sentences. Minimal coughing during exam. No accessory muscle usage. Overall normal work of breathing.    Abdominal: Abdomen is soft. She exhibits no distension. There is no abdominal tenderness. There is no rebound.   Musculoskeletal:         General: Normal range of motion.      Cervical back: Normal range  of motion.     Neurological: She is alert and oriented to person, place, and time. She has normal strength. No cranial nerve deficit or sensory deficit. GCS score is 15. GCS eye subscore is 4. GCS verbal subscore is 5. GCS motor subscore is 6.   Skin: Capillary refill takes less than 2 seconds. No rash noted.   Psychiatric: She has a normal mood and affect. Thought content normal.         ED Course   Procedures  Labs Reviewed   CBC W/ AUTO DIFFERENTIAL - Abnormal       Result Value    WBC 4.72      RBC 4.68      Hemoglobin 11.9 (*)     Hematocrit 41.6      MCV 89      MCH 25.4 (*)     MCHC 28.6 (*)     RDW 21.3 (*)     Platelets 261      MPV 9.8      Immature Granulocytes 0.2      Gran # (ANC) 2.2      Immature Grans (Abs) 0.01      Lymph # 1.2      Mono # 0.8      Eos # 0.5      Baso # 0.03      nRBC 0      Gran % 47.5      Lymph % 24.4      Mono % 16.3 (*)     Eosinophil % 11.0 (*)     Basophil % 0.6      Differential Method Automated     COMPREHENSIVE METABOLIC PANEL - Abnormal    Sodium 139      Potassium 3.4 (*)     Chloride 94 (*)     CO2 36 (*)     Glucose 83      BUN 14      Creatinine 4.2 (*)     Calcium 9.1      Total Protein 8.4      Albumin 4.4      Total Bilirubin 0.6      Alkaline Phosphatase 74      AST 15      ALT 12      eGFR 9.9 (*)     Anion Gap 9     PHOSPHORUS - Abnormal    Phosphorus 1.7 (*)    TROPONIN I HIGH SENSITIVITY - Abnormal    Troponin I High Sensitivity 34.9 (*)    B-TYPE NATRIURETIC PEPTIDE - Abnormal    BNP 1,130 (*)    MAGNESIUM    Magnesium 1.8     SARS-COV-2 RNA AMPLIFICATION, QUAL    SARS-CoV-2 RNA, Amplification, Qual Negative     INFLUENZA A AND B ANTIGEN    Influenza A, Molecular Negative      Influenza B, Molecular Negative      Flu A & B Source Nasal swab      Narrative:     Specimen Source->Nasopharyngeal Swab   B-TYPE NATRIURETIC PEPTIDE          Imaging Results              X-Ray Chest AP Portable (Final result)  Result time 08/09/24 13:36:19      Final result by Jonathan  Aleksandar BIRD MD (08/09/24 13:36:19)                   Impression:      Right basilar ground-glass opacities, suspicious for viral/atypical pneumonia in the appropriate clinical setting.      Electronically signed by: Aleksandar Castillo  Date:    08/09/2024  Time:    13:36               Narrative:    EXAMINATION:  XR CHEST AP PORTABLE    CLINICAL HISTORY:  cough;    COMPARISON:  07/15/2024    FINDINGS:  Ill-defined ground-glass opacities at the right lung base.  Right upper lung is clear.  Left lung is clear.  No large pleural effusion.  No pneumothorax.  No acute osseous abnormality.    Stable cardiomediastinal silhouette, noting atherosclerotic calcification of the aorta.                                       Medications   hydrALAZINE injection 10 mg (10 mg Intravenous Given 8/9/24 1451)   cefTRIAXone (ROCEPHIN) 1 g in dextrose 5 % 100 mL IVPB (ready to mix) (0 g Intravenous Stopped 8/9/24 1549)   azithromycin 500 mg in dextrose 5 % 250 mL IVPB (ready to mix) (0 mg Intravenous Stopped 8/9/24 1806)     Medical Decision Making  Tyra Isaac is a 84 y.o. female ith a past medical history of atrial fibrillation currently on Eliquis, ESRD on HD Monday Wednesday Friday, hypertension, and previous CVA that presents emergency department for shortness of breath, coughing, and elevated blood pressure. Initial vitals with BP of 195/86. BP continued to increased throughout ED visit. Exam with nontoxic female. Normal work of breathing. Minimal changes to lung sounds. No evidence of volume overload. Differential included but was not limited to PNA, ACS, CHF, and Hypertensive emergency.     Amount and/or Complexity of Data Reviewed  Labs: ordered.     Details: Troponin mildly elevated but at baseline. BNP elevated but at baseline. CR elevated consistent with known history of ESRD. CBC without leukocytosis. HGB at baseline.  Radiology: ordered and independent interpretation performed.     Details: Ground glass opacitiy in right  lung base concerning for possible pneumonia.   Discussion of management or test interpretation with external provider(s): Given hydralazine with significant improvement of blood pressure.Rocephin and azithromycin for possible pneumonia. Discharged on doxycycline for PNA. Working with SW to arrange closer outpatient appointment. Pulse ox is adequate and WOB is acceptable. Stable for discharge. Return precautions given. Instructed to follow-up with PCP.     Risk  Prescription drug management.                                      Clinical Impression:  Final diagnoses:  [R03.0] Elevated blood pressure reading  [J18.9] Pneumonia due to infectious organism, unspecified laterality, unspecified part of lung (Primary)          ED Disposition Condition    Discharge Stable          ED Prescriptions       Medication Sig Dispense Start Date End Date Auth. Provider    doxycycline (VIBRAMYCIN) 100 MG Cap  (Status: Discontinued) Take 1 capsule (100 mg total) by mouth 2 (two) times daily. for 10 days 20 capsule 8/9/2024 8/9/2024 Elijah Parson MD    doxycycline (VIBRAMYCIN) 100 MG Cap Take 1 capsule (100 mg total) by mouth 2 (two) times daily. for 10 days 20 capsule 8/9/2024 8/19/2024 Elijah Parson MD          Follow-up Information       Follow up With Specialties Details Why Contact Info Additional Information    Counts include 234 beds at the [facility] - Emergency Dept Emergency Medicine  As needed, If symptoms worsen [address] [facility] [id number] [phone] 1st floor             Elijah Parson MD  08/10/24 0769

## 2024-08-09 NOTE — FIRST PROVIDER EVALUATION
Emergency Department TeleTriage Encounter Note      CHIEF COMPLAINT    Chief Complaint   Patient presents with    Cough     Pt coughing up phlegm for a couple weeks    Hypertension     Pt sent after dialysis for hypertension       VITAL SIGNS   Initial Vitals [08/09/24 1154]   BP Pulse Resp Temp SpO2   (!) 195/86 60 18 98.1 °F (36.7 °C) 100 %      MAP       --            ALLERGIES    Review of patient's allergies indicates:   Allergen Reactions    Cyclobenzaprine     Fish containing products Hives    Peanut Other (See Comments)    Tramadol Itching       PROVIDER TRIAGE NOTE  This is a teletriage evaluation of a 84 y.o. female presenting to the ED complaining of cough for a few weeks. Pt was referred to ED after dialysis today due to elevated BP. Pt reports feeling light-headed. Denies CP, SOB, and headache.     Alert, no distress    Initial orders will be placed and care will be transferred to an alternate provider when patient is roomed for a full evaluation. Any additional orders and the final disposition will be determined by that provider.         ORDERS  Labs Reviewed   CBC W/ AUTO DIFFERENTIAL   COMPREHENSIVE METABOLIC PANEL   MAGNESIUM   PHOSPHORUS       ED Orders (720h ago, onward)      Start Ordered     Status Ordering Provider    08/09/24 1254 08/09/24 1253  X-Ray Chest AP Portable  1 time imaging         Ordered MARIE HASTINGS N.    08/09/24 1253 08/09/24 1253  CBC auto differential  STAT         Ordered MARIE HASTINGS N.    08/09/24 1253 08/09/24 1253  Comprehensive metabolic panel  STAT         Ordered MARIE HASTINGS N.    08/09/24 1253 08/09/24 1253  Magnesium  STAT         Ordered MARIE HASTINGS N.    08/09/24 1253 08/09/24 1253  EKG 12-lead  Once         Ordered MARIE HSATINGS N.    08/09/24 1253 08/09/24 1253  Phosphorus  STAT         Ordered MARIE HASTINGS N.    08/09/24 1253 08/09/24 1253  Insert Saline lock IV  Once         Ordered VENKATA  MARIE COTTON              Virtual Visit Note: The provider triage portion of this emergency department evaluation and documentation was performed via [employer], a HIPAA-compliant telemedicine application, in concert with a tele-presenter in the room. A face to face patient evaluation with one of my colleagues will occur once the patient is placed in an emergency department room.      DISCLAIMER: This note was prepared with [employer] voice recognition transcription software. Garbled syntax, mangled pronouns, and other bizarre constructions may be attributed to that software system.

## 2024-08-09 NOTE — DISCHARGE INSTRUCTIONS
Please return to the emergency department if you have new or worsening symptoms.  Follow up with the primary care doctor in 3-5 days.  Began taking oral antibiotics twice daily.

## 2024-08-10 NOTE — PLAN OF CARE
08/10/24 1035   Discharge Reassessment   Assessment Type Discharge Planning Reassessment   Did the patient's condition or plan change since previous assessment? Yes   Post-Acute Status   Hospital Resources/Appts/Education Provided Appointments scheduled and added to AVS   Discharge Delays None known at this time     Pt has ED follow-up with PCP on 8/20/2024 @ 10:00 am

## 2024-08-12 ENCOUNTER — HOSPITAL ENCOUNTER (INPATIENT)
Facility: HOSPITAL | Age: 84
LOS: 4 days | Discharge: HOME HEALTH | DRG: 070 | End: 2024-08-17
Attending: EMERGENCY MEDICINE | Admitting: FAMILY MEDICINE
Payer: MEDICARE

## 2024-08-12 DIAGNOSIS — Z99.2 END STAGE RENAL DISEASE ON DIALYSIS: ICD-10-CM

## 2024-08-12 DIAGNOSIS — R07.9 CHEST PAIN: ICD-10-CM

## 2024-08-12 DIAGNOSIS — N18.6 END STAGE RENAL DISEASE ON DIALYSIS: ICD-10-CM

## 2024-08-12 DIAGNOSIS — G93.41 ACUTE METABOLIC ENCEPHALOPATHY: ICD-10-CM

## 2024-08-12 DIAGNOSIS — R79.89 ELEVATED TROPONIN: ICD-10-CM

## 2024-08-12 DIAGNOSIS — I49.9 IRREGULARLY IRREGULAR PULSE RHYTHM: ICD-10-CM

## 2024-08-12 DIAGNOSIS — R53.83 FATIGUE, UNSPECIFIED TYPE: Primary | ICD-10-CM

## 2024-08-12 PROBLEM — I24.89 DEMAND ISCHEMIA: Status: ACTIVE | Noted: 2022-01-27

## 2024-08-12 PROBLEM — N18.5 BENIGN HYPERTENSION WITH CKD (CHRONIC KIDNEY DISEASE) STAGE V: Status: ACTIVE | Noted: 2018-03-28

## 2024-08-12 LAB
ALBUMIN SERPL BCP-MCNC: 3.8 G/DL (ref 3.5–5.2)
ALP SERPL-CCNC: 62 U/L (ref 55–135)
ALT SERPL W/O P-5'-P-CCNC: 15 U/L (ref 10–44)
ANION GAP SERPL CALC-SCNC: 8 MMOL/L (ref 8–16)
AST SERPL-CCNC: 25 U/L (ref 10–40)
BACTERIA #/AREA URNS HPF: NORMAL /HPF
BASOPHILS # BLD AUTO: 0.02 K/UL (ref 0–0.2)
BASOPHILS NFR BLD: 0.4 % (ref 0–1.9)
BILIRUB SERPL-MCNC: 0.7 MG/DL (ref 0.1–1)
BILIRUB UR QL STRIP: NEGATIVE
BNP SERPL-MCNC: 3476 PG/ML (ref 0–99)
BUN SERPL-MCNC: 19 MG/DL (ref 8–23)
CALCIUM SERPL-MCNC: 8.8 MG/DL (ref 8.7–10.5)
CHLORIDE SERPL-SCNC: 97 MMOL/L (ref 95–110)
CLARITY UR: CLEAR
CO2 SERPL-SCNC: 33 MMOL/L (ref 23–29)
COLOR UR: YELLOW
CREAT SERPL-MCNC: 5 MG/DL (ref 0.5–1.4)
DIFFERENTIAL METHOD BLD: ABNORMAL
EOSINOPHIL # BLD AUTO: 0.4 K/UL (ref 0–0.5)
EOSINOPHIL NFR BLD: 7.7 % (ref 0–8)
ERYTHROCYTE [DISTWIDTH] IN BLOOD BY AUTOMATED COUNT: 21.2 % (ref 11.5–14.5)
EST. GFR  (NO RACE VARIABLE): 8.1 ML/MIN/1.73 M^2
GLUCOSE SERPL-MCNC: 126 MG/DL (ref 70–110)
GLUCOSE SERPL-MCNC: 79 MG/DL (ref 70–110)
GLUCOSE UR QL STRIP: NEGATIVE
GROUP A STREP, MOLECULAR: NEGATIVE
HCT VFR BLD AUTO: 36.9 % (ref 37–48.5)
HGB BLD-MCNC: 10.9 G/DL (ref 12–16)
HGB UR QL STRIP: ABNORMAL
HYALINE CASTS #/AREA URNS LPF: 0 /LPF
IMM GRANULOCYTES # BLD AUTO: 0.01 K/UL (ref 0–0.04)
IMM GRANULOCYTES NFR BLD AUTO: 0.2 % (ref 0–0.5)
INFLUENZA A, MOLECULAR: NEGATIVE
INFLUENZA B, MOLECULAR: NEGATIVE
KETONES UR QL STRIP: NEGATIVE
LEUKOCYTE ESTERASE UR QL STRIP: ABNORMAL
LYMPHOCYTES # BLD AUTO: 0.8 K/UL (ref 1–4.8)
LYMPHOCYTES NFR BLD: 14.9 % (ref 18–48)
MAGNESIUM SERPL-MCNC: 1.8 MG/DL (ref 1.6–2.6)
MCH RBC QN AUTO: 26 PG (ref 27–31)
MCHC RBC AUTO-ENTMCNC: 29.5 G/DL (ref 32–36)
MCV RBC AUTO: 88 FL (ref 82–98)
MICROSCOPIC COMMENT: NORMAL
MONOCYTES # BLD AUTO: 0.9 K/UL (ref 0.3–1)
MONOCYTES NFR BLD: 16 % (ref 4–15)
NEUTROPHILS # BLD AUTO: 3.2 K/UL (ref 1.8–7.7)
NEUTROPHILS NFR BLD: 60.8 % (ref 38–73)
NITRITE UR QL STRIP: NEGATIVE
NRBC BLD-RTO: 0 /100 WBC
OHS QRS DURATION: 92 MS
OHS QTC CALCULATION: 458 MS
PH UR STRIP: 8 [PH] (ref 5–8)
PHOSPHATE SERPL-MCNC: 2 MG/DL (ref 2.7–4.5)
PLATELET # BLD AUTO: 237 K/UL (ref 150–450)
PMV BLD AUTO: 9.6 FL (ref 9.2–12.9)
POTASSIUM SERPL-SCNC: 3.8 MMOL/L (ref 3.5–5.1)
PROT SERPL-MCNC: 7.3 G/DL (ref 6–8.4)
PROT UR QL STRIP: ABNORMAL
RBC # BLD AUTO: 4.19 M/UL (ref 4–5.4)
RBC #/AREA URNS HPF: 0 /HPF (ref 0–4)
SARS-COV-2 RDRP RESP QL NAA+PROBE: NEGATIVE
SODIUM SERPL-SCNC: 138 MMOL/L (ref 136–145)
SP GR UR STRIP: 1.01 (ref 1–1.03)
SPECIMEN SOURCE: NORMAL
SQUAMOUS #/AREA URNS HPF: 0 /HPF
TROPONIN I SERPL HS-MCNC: 177 PG/ML (ref 0–14.9)
TROPONIN I SERPL HS-MCNC: 226.3 PG/ML (ref 0–14.9)
TSH SERPL DL<=0.005 MIU/L-ACNC: 3.75 UIU/ML (ref 0.34–5.6)
URN SPEC COLLECT METH UR: ABNORMAL
UROBILINOGEN UR STRIP-ACNC: NEGATIVE EU/DL
WBC # BLD AUTO: 5.31 K/UL (ref 3.9–12.7)
WBC #/AREA URNS HPF: 4 /HPF (ref 0–5)

## 2024-08-12 PROCEDURE — 84443 ASSAY THYROID STIM HORMONE: CPT | Performed by: EMERGENCY MEDICINE

## 2024-08-12 PROCEDURE — 83735 ASSAY OF MAGNESIUM: CPT | Performed by: EMERGENCY MEDICINE

## 2024-08-12 PROCEDURE — 84100 ASSAY OF PHOSPHORUS: CPT | Performed by: EMERGENCY MEDICINE

## 2024-08-12 PROCEDURE — 80053 COMPREHEN METABOLIC PANEL: CPT | Performed by: EMERGENCY MEDICINE

## 2024-08-12 PROCEDURE — 85025 COMPLETE CBC W/AUTO DIFF WBC: CPT | Performed by: EMERGENCY MEDICINE

## 2024-08-12 PROCEDURE — 87502 INFLUENZA DNA AMP PROBE: CPT | Performed by: EMERGENCY MEDICINE

## 2024-08-12 PROCEDURE — G0378 HOSPITAL OBSERVATION PER HR: HCPCS

## 2024-08-12 PROCEDURE — 83880 ASSAY OF NATRIURETIC PEPTIDE: CPT | Performed by: EMERGENCY MEDICINE

## 2024-08-12 PROCEDURE — 84484 ASSAY OF TROPONIN QUANT: CPT | Mod: 91 | Performed by: NURSE PRACTITIONER

## 2024-08-12 PROCEDURE — 25000003 PHARM REV CODE 250: Performed by: NURSE PRACTITIONER

## 2024-08-12 PROCEDURE — U0002 COVID-19 LAB TEST NON-CDC: HCPCS | Performed by: EMERGENCY MEDICINE

## 2024-08-12 PROCEDURE — 93005 ELECTROCARDIOGRAM TRACING: CPT | Performed by: INTERNAL MEDICINE

## 2024-08-12 PROCEDURE — 81001 URINALYSIS AUTO W/SCOPE: CPT | Performed by: EMERGENCY MEDICINE

## 2024-08-12 PROCEDURE — 87651 STREP A DNA AMP PROBE: CPT | Performed by: EMERGENCY MEDICINE

## 2024-08-12 PROCEDURE — 99285 EMERGENCY DEPT VISIT HI MDM: CPT | Mod: 25

## 2024-08-12 PROCEDURE — 84484 ASSAY OF TROPONIN QUANT: CPT | Performed by: EMERGENCY MEDICINE

## 2024-08-12 RX ORDER — NALOXONE HCL 0.4 MG/ML
0.02 VIAL (ML) INJECTION
Status: DISCONTINUED | OUTPATIENT
Start: 2024-08-12 | End: 2024-08-17 | Stop reason: HOSPADM

## 2024-08-12 RX ORDER — HYDRALAZINE HYDROCHLORIDE 20 MG/ML
10 INJECTION INTRAMUSCULAR; INTRAVENOUS EVERY 6 HOURS PRN
Status: DISCONTINUED | OUTPATIENT
Start: 2024-08-12 | End: 2024-08-17 | Stop reason: HOSPADM

## 2024-08-12 RX ORDER — ATORVASTATIN CALCIUM 40 MG/1
40 TABLET, FILM COATED ORAL DAILY
Status: DISCONTINUED | OUTPATIENT
Start: 2024-08-12 | End: 2024-08-17 | Stop reason: HOSPADM

## 2024-08-12 RX ORDER — IBUPROFEN 200 MG
24 TABLET ORAL
Status: DISCONTINUED | OUTPATIENT
Start: 2024-08-12 | End: 2024-08-17 | Stop reason: HOSPADM

## 2024-08-12 RX ORDER — ACETAMINOPHEN 325 MG/1
650 TABLET ORAL EVERY 8 HOURS PRN
Status: DISCONTINUED | OUTPATIENT
Start: 2024-08-12 | End: 2024-08-17 | Stop reason: HOSPADM

## 2024-08-12 RX ORDER — PATIROMER 8.4 G/1
8.4 POWDER, FOR SUSPENSION ORAL DAILY
Status: ON HOLD | COMMUNITY
Start: 2024-08-09 | End: 2024-08-17 | Stop reason: HOSPADM

## 2024-08-12 RX ORDER — GLUCAGON 1 MG
1 KIT INJECTION
Status: DISCONTINUED | OUTPATIENT
Start: 2024-08-12 | End: 2024-08-17 | Stop reason: HOSPADM

## 2024-08-12 RX ORDER — AMIODARONE HYDROCHLORIDE 100 MG/1
100 TABLET ORAL DAILY
Status: DISCONTINUED | OUTPATIENT
Start: 2024-08-13 | End: 2024-08-17 | Stop reason: HOSPADM

## 2024-08-12 RX ORDER — ACETAMINOPHEN 325 MG/1
650 TABLET ORAL EVERY 4 HOURS PRN
Status: DISCONTINUED | OUTPATIENT
Start: 2024-08-12 | End: 2024-08-17 | Stop reason: HOSPADM

## 2024-08-12 RX ORDER — METOCLOPRAMIDE 5 MG/1
5 TABLET ORAL
Status: DISCONTINUED | OUTPATIENT
Start: 2024-08-12 | End: 2024-08-13

## 2024-08-12 RX ORDER — ALUMINUM HYDROXIDE, MAGNESIUM HYDROXIDE, AND SIMETHICONE 1200; 120; 1200 MG/30ML; MG/30ML; MG/30ML
30 SUSPENSION ORAL 4 TIMES DAILY PRN
Status: DISCONTINUED | OUTPATIENT
Start: 2024-08-12 | End: 2024-08-17 | Stop reason: HOSPADM

## 2024-08-12 RX ORDER — LATANOPROST 50 UG/ML
1 SOLUTION/ DROPS OPHTHALMIC NIGHTLY
Status: DISCONTINUED | OUTPATIENT
Start: 2024-08-12 | End: 2024-08-17 | Stop reason: HOSPADM

## 2024-08-12 RX ORDER — ONDANSETRON HYDROCHLORIDE 2 MG/ML
4 INJECTION, SOLUTION INTRAVENOUS EVERY 6 HOURS PRN
Status: DISCONTINUED | OUTPATIENT
Start: 2024-08-12 | End: 2024-08-17 | Stop reason: HOSPADM

## 2024-08-12 RX ORDER — DOXYCYCLINE 100 MG/1
100 CAPSULE ORAL 2 TIMES DAILY
Status: DISCONTINUED | OUTPATIENT
Start: 2024-08-12 | End: 2024-08-17 | Stop reason: HOSPADM

## 2024-08-12 RX ORDER — APIXABAN 2.5 MG/1
2.5 TABLET, FILM COATED ORAL 2 TIMES DAILY
COMMUNITY
Start: 2024-08-06

## 2024-08-12 RX ORDER — TALC
6 POWDER (GRAM) TOPICAL NIGHTLY PRN
Status: DISCONTINUED | OUTPATIENT
Start: 2024-08-12 | End: 2024-08-17 | Stop reason: HOSPADM

## 2024-08-12 RX ORDER — IBUPROFEN 200 MG
16 TABLET ORAL
Status: DISCONTINUED | OUTPATIENT
Start: 2024-08-12 | End: 2024-08-17 | Stop reason: HOSPADM

## 2024-08-12 RX ADMIN — DOXYCYCLINE HYCLATE 100 MG: 100 CAPSULE ORAL at 09:08

## 2024-08-12 RX ADMIN — LATANOPROST 1 DROP: 50 SOLUTION OPHTHALMIC at 10:08

## 2024-08-12 RX ADMIN — ATORVASTATIN CALCIUM 40 MG: 40 TABLET, FILM COATED ORAL at 09:08

## 2024-08-12 RX ADMIN — APIXABAN 2.5 MG: 2.5 TABLET, FILM COATED ORAL at 09:08

## 2024-08-12 NOTE — HPI
The patient is a 84 year old female with a PMHx of ESRD on HD (F - Dr. Mott), Atrial fibrillation (on Eliquis), HTN, CAD, COPD, previous CVA who presnted to the emergency department for the evaluation of generalized weakness x 3 weeks. Patient was recently evaluated in the emergency department here at [facility] 8/9/24 for the evaluation of shortness of breath with cough and generalized weakness. Chest xray at that time revealed right basilar ground-glass opacities, suspicious for viral/atypical pneumonia vs pulmonary edema. She was discharged home with doxycycline. Prior to that she was admitted here and discharged on 7/19/24 after a admission for bradycardia for which her amiodarone and metoprolol were discontinued per cardiology and she was placed on cardizem. However, upon follow up she was taken off her cardizem and placed back on amiodarone at 100 mg daily by her cardiologist Dr. Thompson. Patient went to HD this am with 1.5L off and patient reported generalized weakness following treatment. EMS was activated and spoke to patient's son who reports that she was been experiencing weakness and confusion over the past three weeks. She has also been having difficulty controlling her blood pressure and has had some medication changes as of recent.     Patient evaluated in the emergency department - COVID and flu negative. Electrolytes stable. BNP 3400, high sensitivity troponin 226, EKG with normal sinus rhythm, 71 beats per minute with incomplete right bundle-branch block. Septal infarct age undetermined with T-wave abnormality in the inferior and anterior leads. Patient admitted to hospital medicine service for further evaluation.

## 2024-08-12 NOTE — ED PROVIDER NOTES
Encounter Date: 8/12/2024       History     Chief Complaint   Patient presents with    Generalized Weakness     Patient arrived to dialysis with generalized weakness - dialysis completed with 1.5L removed then EMS called for weakness - EMS talked to patient's son and stated that patient has been having generalized weakness and confusion for the past 3 weeks - hx of bradycardia and afib - pt's son states that BP has been difficult to control and has been trying different cardiac medications - currently on metoprolol.     HPI patient is an 84-year-old woman who presents emergency department from dialysis for evaluation of generalized weakness for proximally 3 weeks.  Patient did receive dialysis this morning.  Patient has a history of atrial fibrillation on Eliquis, hypertension, CAD, end-stage renal disease on hemodialysis, COPD, previous stroke.  She endorses some discomfort with urinating, cough.  She was recently evaluated 2 days ago in the ED for pneumonia suspicious for atypical etiology and treated with Rocephin and azithromycin in the ED and discharged home on doxycycline.  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    Osteopenia of multiple sites 3/9/2018    Based upon bone density measurements. Patient also has chronic kidney disease.    Stroke 11/2016     Past Surgical History:   Procedure  Laterality Date    ANGIOGRAM, CORONARY, WITH LEFT HEART CATHETERIZATION N/A 2/7/2022    Procedure: Angiogram, Coronary, with Left Heart Cath;  Surgeon: Gino Leal MD;  Location: [facility] CATH/EP LAB;  Service: Cardiology;  Laterality: N/A;    CARDIAC SURGERY      stents    EYE SURGERY      WRIST SURGERY       Family History   Problem Relation Name Age of Onset    Heart disease Mother      Cancer Father       Social History     Tobacco Use    Smoking status: Never    Smokeless tobacco: Never   Substance Use Topics    Alcohol use: No    Drug use: No     Review of Systems   Constitutional:  Positive for chills. Negative for fever.   HENT:  Negative for sore throat.    Respiratory:  Positive for cough and shortness of breath.    Cardiovascular:  Negative for chest pain.   Gastrointestinal:  Negative for nausea.   Genitourinary:  Positive for dysuria.   Musculoskeletal:  Positive for myalgias. Negative for back pain.   Skin:  Negative for rash.   Neurological:  Negative for weakness.   Hematological:  Does not bruise/bleed easily.   Psychiatric/Behavioral:  Positive for confusion.        Physical Exam     Initial Vitals   BP Pulse Resp Temp SpO2   08/12/24 1054 08/12/24 1054 08/12/24 1054 08/12/24 1103 08/12/24 1054   (!) 162/80 75 18 98.5 °F (36.9 °C) 98 %      MAP       --                Physical Exam    Constitutional: Vital signs are normal. She appears well-developed and well-nourished.  Non-toxic appearance. No distress.   HENT:   Head: Normocephalic and atraumatic.   Eyes: EOM are normal. Pupils are equal, round, and reactive to light.   Neck: Neck supple. No JVD present.   Normal range of motion.  Cardiovascular:  Normal rate, regular rhythm, normal heart sounds and intact distal pulses.           Pulmonary/Chest: Tachypnea noted. She has no wheezes. She has no rhonchi. She has rales (Faint basilar).   Abdominal: Abdomen is soft. Bowel sounds are normal. There is no abdominal tenderness. There is no rebound  and no guarding.   Musculoskeletal:         General: Normal range of motion.      Cervical back: Normal range of motion and neck supple. No rigidity.     Neurological: She is alert. She has normal reflexes. No cranial nerve deficit or sensory deficit. She exhibits normal muscle tone. Coordination normal. GCS eye subscore is 4. GCS verbal subscore is 5. GCS motor subscore is 6.   Alert to self and place not year   Skin: Skin is warm and dry.   Psychiatric: She has a normal mood and affect. Her speech is normal and behavior is normal. She is not actively hallucinating.         ED Course   Procedures  Labs Reviewed   B-TYPE NATRIURETIC PEPTIDE - Abnormal       Result Value    BNP 3,476 (*)    CBC W/ AUTO DIFFERENTIAL - Abnormal    WBC 5.31      RBC 4.19      Hemoglobin 10.9 (*)     Hematocrit 36.9 (*)     MCV 88      MCH 26.0 (*)     MCHC 29.5 (*)     RDW 21.2 (*)     Platelets 237      MPV 9.6      Immature Granulocytes 0.2      Gran # (ANC) 3.2      Immature Grans (Abs) 0.01      Lymph # 0.8 (*)     Mono # 0.9      Eos # 0.4      Baso # 0.02      nRBC 0      Gran % 60.8      Lymph % 14.9 (*)     Mono % 16.0 (*)     Eosinophil % 7.7      Basophil % 0.4      Differential Method Automated     COMPREHENSIVE METABOLIC PANEL - Abnormal    Sodium 138      Potassium 3.8      Chloride 97      CO2 33 (*)     Glucose 79      BUN 19      Creatinine 5.0 (*)     Calcium 8.8      Total Protein 7.3      Albumin 3.8      Total Bilirubin 0.7      Alkaline Phosphatase 62      AST 25      ALT 15      eGFR 8.1 (*)     Anion Gap 8     PHOSPHORUS - Abnormal    Phosphorus 2.0 (*)    URINALYSIS, REFLEX TO URINE CULTURE - Abnormal    Specimen UA Urine, Catheterized      Color, UA Yellow      Appearance, UA Clear      pH, UA 8.0      Specific Gravity, UA 1.010      Protein, UA 1+ (*)     Glucose, UA Negative      Ketones, UA Negative      Bilirubin (UA) Negative      Occult Blood UA 1+ (*)     Nitrite, UA Negative      Urobilinogen, UA  Negative      Leukocytes, UA 2+ (*)     Narrative:     Specimen Source->Urine   TROPONIN I HIGH SENSITIVITY - Abnormal    Troponin I High Sensitivity 226.3 (*)    TROPONIN I HIGH SENSITIVITY - Abnormal    Troponin I High Sensitivity 177.0 (*)    GROUP A STREP, MOLECULAR    Group A Strep, Molecular Negative     SARS-COV-2 RNA AMPLIFICATION, QUAL    SARS-CoV-2 RNA, Amplification, Qual Negative     INFLUENZA A AND B ANTIGEN    Influenza A, Molecular Negative      Influenza B, Molecular Negative      Flu A & B Source Nasal swab      Narrative:     Specimen Source->Nasopharyngeal Swab   MAGNESIUM    Magnesium 1.8     TSH    TSH 3.748     URINALYSIS MICROSCOPIC    RBC, UA 0      WBC, UA 4      Bacteria None      Squam Epithel, UA 0      Hyaline Casts, UA 0      Microscopic Comment SEE COMMENT      Narrative:     Specimen Source->Urine   POCT GLUCOSE MONITORING CONTINUOUS     EKG Readings: (Independently Interpreted)   Normal sinus rhythm, 71 beats per minute with incomplete right bundle-branch block.  Septal infarct age undetermined with T-wave abnormality in the inferior and anterior leads.     ECG Results              EKG 12-lead (Final result)  Result time 08/13/24 16:01:03      Final result by Unknown User (08/13/24 16:01:03)                                      Imaging Results              CT Head Without Contrast (Final result)  Result time 08/12/24 17:32:48      Final result by Vince Kimball MD (08/12/24 17:32:48)                   Impression:      1. No acute intracranial abnormality appreciated.  No interval detrimental change in the imaging appearance of the brain when compared with the prior MRI of the brain performed 11/11/2022.  2. Minimal right maxillary sinus disease.  3. Age-appropriate supratentorial cerebral volume loss and chronic microvascular ischemic changes within the periventricular white matter of the supratentorial brain.  4. Small foci of remote lacunar infarction within the lateral  aspect of the left caudate head and left basal ganglia, unchanged when compared to the prior study..      Electronically signed by: Eyad Kimball MD  Date:    08/12/2024  Time:    17:32               Narrative:    EXAMINATION:  CT HEAD WITHOUT CONTRAST    CLINICAL HISTORY:  Mental status change, unknown cause;    TECHNIQUE:  5 mm low dose non-contrast contiguous axial images were acquired through the head.  Subsequently, 2 dimensional coronal and sagittal reconstructed images were generated from the source data.    COMPARISON:  MRI brain-11/11/2022    FINDINGS:  The brain is normally formed with preserved gray-white matter junction differentiation. No evidence of acute/recent major vascular territory cerebral infarction, parenchymal hemorrhage, or intra-axial mass.  There are confluent areas of periventricular white matter hypoattenuation compatible with age-appropriate chronic microvascular ischemic changes.  There is age-appropriate cerebral volume loss with widening of the CSF spaces over both cerebral convexities.  There is a small focus of remote lacunar infarction present within the lateral aspect of the left caudate head, similar to the prior examination.  There are small foci of remote lacunar infarction within the left basal ganglia, unchanged.    No hydrocephalus.  No effacement of the skull-base cisterns.  No extra-axial fluid collections or blood products.    There is minimal mucoperiosteal thickening noted within the dependent right maxillary sinus.  The visualized orbits are unremarkable.  The bony calvarium and visualized facial bones show no acute abnormality.                                       X-Ray Chest AP Portable (Final result)  Result time 08/12/24 12:14:44      Final result by Rahul Palacio MD (08/12/24 12:14:44)                   Impression:      Unchanged radiograph of the chest when accounting for differences in imaging technique.      Electronically signed by: Rahul  Hakeem  Date:    08/12/2024  Time:    12:14               Narrative:    CLINICAL HISTORY:  ([id number] y/o  (1940) F    sob;    TECHNIQUE:  (A#[id number], exam time 8/12/2024 12:11)    XR CHEST AP PORTABLE [id number]    COMPARISON:  Radiograph from 08/09/2024.    FINDINGS:  There are mild faint linear opacities at the lung bases suggestive of atelectasis/scarring  aspiration/pneumonia or trace edema in the appropriate clinical setting.  No pneumothorax is identified. The heart is mildly enlarged. Atheromatous calcifications are seen at the aortic arch. Osseous structures show degenerative changes in the spine. The visualized upper abdomen is unremarkable.                                       Medications   melatonin tablet 6 mg (has no administration in time range)   ondansetron injection 4 mg (has no administration in time range)   acetaminophen tablet 650 mg (has no administration in time range)   aluminum-magnesium hydroxide-simethicone 200-200-20 mg/5 mL suspension 30 mL (has no administration in time range)   acetaminophen tablet 650 mg (has no administration in time range)   naloxone 0.4 mg/mL injection 0.02 mg (has no administration in time range)   glucose chewable tablet 16 g (has no administration in time range)   glucose chewable tablet 24 g (has no administration in time range)   dextrose 50% injection 12.5 g (has no administration in time range)   dextrose 50% injection 25 g (has no administration in time range)   glucagon (human recombinant) injection 1 mg (has no administration in time range)   amiodarone tablet 100 mg (100 mg Oral Given 8/13/24 0940)   atorvastatin tablet 40 mg (40 mg Oral Given 8/12/24 2103)   apixaban tablet 2.5 mg (2.5 mg Oral Given 8/13/24 0940)   latanoprost 0.005 % ophthalmic solution 1 drop (1 drop Both Eyes Given 8/12/24 2232)   sodium zirconium cyclosilicate packet 10 g (10 g Oral Given 8/13/24 1742)   doxycycline capsule 100 mg (100 mg Oral Given 8/13/24 0940)    hydrALAZINE injection 10 mg (10 mg Intravenous Given 8/13/24 0806)   epoetin rosy-epbx injection 2,400 Units (has no administration in time range)     Medical Decision Making  4-year-old woman who presents emergency department from dialysis for evaluation of generalized weakness for approximately 3 weeks.  Patient did receive dialysis this morning.  Patient has a history of atrial fibrillation on Eliquis, hypertension, CAD, end-stage renal disease on hemodialysis, COPD, previous stroke.  She endorses some discomfort with urinating, cough.  She was recently evaluated 2 days ago in the ED for pneumonia suspicious for atypical etiology and treated with Rocephin and azithromycin in the ED and discharged home on doxycycline.    Diagnosis includes urinary tract infection, uremia, hyperkalemia, ACS, anemia  BNP 3476, hemoglobin 10.9 with normal white blood cell count of 5.31, potassium 3.8, troponin 226.3, TSH normal at 3.74, chest x-ray unchanged from previous, CT head without any acute abnormalities discussed Hospital Medicine who will admit the patient is a trend troponins and further evaluate her acute metabolic encephalopathy.    Amount and/or Complexity of Data Reviewed  Labs: ordered.  Radiology: ordered.               ED Course as of 08/13/24 1823   Mon Aug 12, 2024   1410 Spoke with Dr. Smith.  Okay to admit with trending troponins.  Unlikely to need any emergent intervention as she had a negative stress recently and no chest pain. [AS]      ED Course User Index  [AS] Luis Enrique Amin MD                           Clinical Impression:  Final diagnoses:  [I49.9] Irregularly irregular pulse rhythm  [R53.83] Fatigue, unspecified type (Primary)  [R79.89] Elevated troponin  [G93.41] Acute metabolic encephalopathy          ED Disposition Condition    Observation Stable                Luis Enrique Amin MD  08/13/24 [id number]

## 2024-08-12 NOTE — ASSESSMENT & PLAN NOTE
Patient presents with generalized confusion with three week history of confusion  Recently treated for possible viral pneumonia continue doxy   Recurrent episodes of confusion, frequent hospitalization, chronic renal disease and debility - will consult palliative care   UA without bacteria  CT brain without contrast without acute findings - previous CVA noted stable from prior exam, age related changes  DC Reglan as this could be affecting patient's cognition and causing tremors   PT/OT ordered for evaluation   Supportive care

## 2024-08-12 NOTE — ASSESSMENT & PLAN NOTE
Patient currently in NSR  Continue amiodarone 100 mg daily  Continue Eliquis 2.5 mg bid   Continue telemetry monitoring   Cardiology consulted

## 2024-08-12 NOTE — ASSESSMENT & PLAN NOTE
Chronic, controlled.  Latest blood pressure and vitals reviewed-     Temp:  [98.5 °F (36.9 °C)]   Pulse:  [68-75]   Resp:  [18]   BP: (160-190)/(70-81)   SpO2:  [95 %-98 %] .   Home meds for hypertension were reviewed and noted below-    While in the hospital, will manage blood pressure as follows; Continue home antihypertensive regimen    Will utilize p.r.n. blood pressure medication only if patient's blood pressure greater than 180/110 and she develops symptoms such as worsening chest pain or shortness of breath.

## 2024-08-12 NOTE — SUBJECTIVE & OBJECTIVE
Past Medical History:   Diagnosis Date    A-fib     Anxiety     Depression     Disorder of kidney and ureter     Encephalopathy acute 1/1/2018    End stage kidney disease 6/17/2017    Gout     Hyperlipidemia     Hypertension     Moderate episode of recurrent major depressive disorder 1/17/2018    Nephropathy hypertensive, 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    Osteopenia of multiple sites 3/9/2018    Based upon bone density measurements. Patient also has chronic kidney disease.    Stroke 11/2016       Past Surgical History:   Procedure Laterality Date    ANGIOGRAM, CORONARY, WITH LEFT HEART CATHETERIZATION N/A 2/7/2022    Procedure: Angiogram, Coronary, with Left Heart Cath;  Surgeon: Gino Leal MD;  Location: [facility] CATH/EP LAB;  Service: Cardiology;  Laterality: N/A;    CARDIAC SURGERY      stents    EYE SURGERY      WRIST SURGERY         Review of patient's allergies indicates:   Allergen Reactions    Cyclobenzaprine     Fish containing products Hives    Peanut Other (See Comments)    Tramadol Itching       No current facility-administered medications on file prior to encounter.     Current Outpatient Medications on File Prior to Encounter   Medication Sig    amiodarone (PACERONE) 100 MG Tab Take 1 tablet (100 mg total) by mouth once daily.    atorvastatin (LIPITOR) 40 MG tablet Take 40 mg by mouth once daily.    b complex vitamins tablet Take 1 tablet by mouth once daily.    doxycycline (VIBRAMYCIN) 100 MG Cap Take 1 capsule (100 mg total) by mouth 2 (two) times daily. for 10 days    ELIQUIS 2.5 mg Tab Take 2.5 mg by mouth 2 (two) times daily.    latanoprost 0.005 % ophthalmic solution Place 1 drop into both eyes every evening.    metoclopramide HCl (REGLAN) 5 MG tablet Take 1 tablet (5 mg total) by mouth 3 (three) times daily before meals.    midodrine (PROAMATINE) 10 MG  tablet Take 1 tablet (10 mg total) by mouth 3 (three) times daily with meals.    ondansetron (ZOFRAN-ODT) 4 MG TbDL Take 1 tablet (4 mg total) by mouth every 6 (six) hours as needed (nausea).    sodium zirconium cyclosilicate (LOKELMA) 10 gram packet Take 1 packet (10 g total) by mouth every Tuesday, Thursday, Saturday, Sunday. Mix entire contents of packet(s) into drinking glass containing 3 tablespoons of water; stir well and drink immediately. Add water and repeat until no powder remains to receive entire dose. (Patient not taking: Reported on 8/12/2024)    VELTASSA 8.4 gram PwPk Take 8.4 g by mouth once daily. (Patient not taking: Reported on 8/12/2024)     Family History       Problem Relation (Age of Onset)    Cancer Father    Heart disease Mother          Tobacco Use    Smoking status: Never    Smokeless tobacco: Never   Substance and Sexual Activity    Alcohol use: No    Drug use: No    Sexual activity: Not Currently     Review of Systems   Constitutional:  Negative for chills and fever.   Respiratory:  Positive for cough and shortness of breath.    Cardiovascular:  Negative for chest pain.   Gastrointestinal:  Negative for abdominal pain, constipation, diarrhea, nausea and vomiting.   Neurological:  Positive for weakness. Negative for dizziness and light-headedness.     Objective:     Vital Signs (Most Recent):  Temp: 98.5 °F (36.9 °C) (08/12/24 1103)  Pulse: 69 (08/12/24 1331)  Resp: 18 (08/12/24 1054)  BP: (!) 160/74 (08/12/24 1331)  SpO2: 96 % (08/12/24 1342) Vital Signs (24h Range):  Temp:  [98.5 °F (36.9 °C)] 98.5 °F (36.9 °C)  Pulse:  [68-75] 69  Resp:  [18] 18  SpO2:  [95 %-98 %] 96 %  BP: (160-190)/(70-81) 160/74     Weight: 46.3 kg (102 lb)  Body mass index is 16.97 kg/m².     Physical Exam  Constitutional:       General: She is not in acute distress.     Appearance: She is not toxic-appearing.   HENT:      Head: Normocephalic.      Nose: Nose normal.      Mouth/Throat:      Mouth: Mucous membranes  are moist.      Pharynx: Oropharynx is clear.   Eyes:      Conjunctiva/sclera: Conjunctivae normal.      Pupils: Pupils are equal, round, and reactive to light.   Neck:      Comments: No JVD  Cardiovascular:      Rate and Rhythm: Normal rate and regular rhythm.      Pulses: Normal pulses.      Heart sounds: Normal heart sounds.   Pulmonary:      Effort: Pulmonary effort is normal.      Breath sounds: Normal breath sounds.   Abdominal:      General: Abdomen is flat. Bowel sounds are normal.      Palpations: Abdomen is soft.   Skin:     General: Skin is warm and dry.      Capillary Refill: Capillary refill takes less than 2 seconds.   Neurological:      General: No focal deficit present.      Mental Status: She is alert. She is disoriented.              CRANIAL NERVES     CN III, IV, VI   Pupils are equal, round, and reactive to light.       Significant Labs: All pertinent labs within the past 24 hours have been reviewed.  Recent Lab Results         08/12/24  1233   08/12/24  1213   08/12/24  1212   08/12/24  1206        Influenza A, Molecular   Negative           Influenza B, Molecular   Negative           Group A Strep, Molecular     Negative  Comment: Arcanobacterium haemolyticum and Beta Streptococcus group C   and G will not be detected by this test method.  Please order   Throat Culture ([id number]) if suspected.           Albumin       3.8       ALP       62       ALT       15       Anion Gap       8       Appearance, UA Clear             AST       25       Bacteria, UA None             Baso #       0.02       Basophil %       0.4       Bilirubin (UA) Negative             BILIRUBIN TOTAL       0.7  Comment: For infants and newborns, interpretation of results should be based  on gestational age, weight and in agreement with clinical  observations.    Premature Infant recommended reference ranges:  Up to 24 hours.............<8.0 mg/dL  Up to 48 hours............<12.0 mg/dL  3-5 days..................<15.0  mg/dL  6-29 days.................<15.0 mg/dL         BNP       3,476  Comment: Values of less than 100 pg/ml are consistent with non-CHF populations.       BUN       19       Calcium       8.8       Chloride       97       CO2       33       Color, UA Yellow             Creatinine       5.0       Differential Method       Automated       eGFR       8.1       Eos #       0.4       Eos %       7.7       Flu A & B Source   Nasal swab           Glucose       79       Glucose, UA Negative             Gran # (ANC)       3.2       Gran %       60.8       Hematocrit       36.9       Hemoglobin       10.9       Hyaline Casts, UA 0             Immature Grans (Abs)       0.01  Comment: Mild elevation in immature granulocytes is non specific and   can be seen in a variety of conditions including stress response,   acute inflammation, trauma and pregnancy. Correlation with other   laboratory and clinical findings is essential.         Immature Granulocytes       0.2       Ketones, UA Negative             Leukocyte Esterase, UA 2+             Lymph #       0.8       Lymph %       14.9       Magnesium        1.8       MCH       26.0       MCHC       29.5       MCV       88       Microscopic Comment SEE COMMENT  Comment: Other formed elements not mentioned in the report are not   present in the microscopic examination.                Mono #       0.9       Mono %       16.0       MPV       9.6       NITRITE UA Negative             nRBC       0       Blood, UA 1+             pH, UA 8.0             Phosphorus Level       2.0       Platelet Count       237       Potassium       3.8       PROTEIN TOTAL       7.3       Protein, UA 1+  Comment: Recommend a 24 hour urine protein or a urine   protein/creatinine ratio if globulin induced proteinuria is  clinically suspected.               RBC       4.19       RBC, UA 0             RDW       21.2       SARS-CoV-2 RNA, Amplification, Qual   Negative  Comment: This test utilizes isothermal  nucleic acid amplification technology   to   detect the SARS-CoV-2 RdRp nucleic acid segment. The analytical   sensitivity   (limit of detection) is 500 copies/swab.     A POSITIVE result is indicative of the presence of SARS-CoV-2 RNA;   clinical   correlation with patient history and other diagnostic information is   necessary to determine patient infection status.    A NEGATIVE result means that SARS-CoV-2 nucleic acids are not present   above   the limit of detection. A NEGATIVE result should be treated as   presumptive.   It does not rule out the possibility of COVID-19 and should not be   the sole   basis for treatment decisions. If COVID-19 is strongly suspected   based on   clinical and exposure history, re-testing using an alternate   molecular assay   should be considered.     This test is FDA approved.Performance characteristics of this test   has been   independently verified by [facility] in conjunction   with   [facility] Department of Pathology and Laboratory   Medicine.             Sodium       138       Spec Grav UA 1.010             Specimen UA Urine, Catheterized             Squam Epithel, UA 0             Troponin I High Sensitivity       226.3  Comment: Troponin results differ between methods. Do not use   results between Troponin methods interchangeably.    Alkaline Phospatase levels above 400 U/L may   cause false positive results.    Access hsTnI should not be used for patients taking   Asfotase rosy (Strensiq).  Critical result TNI[facility] 226.3 pg/mL called to and read back by Elle Cardenas  RN/ed at 12-Aug-2024 13:25 by Altaf.  Result Rechecked         TSH       3.748       UROBILINOGEN UA Negative             WBC, UA 4             WBC       5.31               Significant Imaging: I have reviewed all pertinent imaging results/findings within the past 24 hours.  I have reviewed and interpreted all pertinent imaging results/findings within the past 24  hours.

## 2024-08-12 NOTE — ASSESSMENT & PLAN NOTE
Initial troponin 226  EKG normal sinus rhythm, 71 beats per minute with incomplete right bundle-branch block. Septal infarct age undetermined with T-wave abnormality in the inferior and anterior leads.   Patient with history of ESRD - last troponin from 8/9/24 34   Last echo from 6/28/24 with EF 64%, mild AS, mod MR, no WMA   Nuclear stress 6/28/24 without reversible ischemia   Cardiology consulted   Trend troponin   Continue telemetry monitoring

## 2024-08-12 NOTE — H&P
[facility] - Emergency Dept  Hospital Medicine  History & Physical    Patient Name: Tyra Isaac  MRN: [id number]  Patient Class: OP- Observation  Admission Date: 8/12/2024  Attending Physician: Maurilio Rowe MD   Primary Care Provider: Kalpesh Goel MD         Patient information was obtained from past medical records and ER records.     Subjective:     Principal Problem:Acute metabolic encephalopathy    Chief Complaint:   Chief Complaint   Patient presents with    Generalized Weakness     Patient arrived to dialysis with generalized weakness - dialysis completed with 1.5L removed then EMS called for weakness - EMS talked to patient's son and stated that patient has been having generalized weakness and confusion for the past 3 weeks - hx of bradycardia and afib - pt's son states that BP has been difficult to control and has been trying different cardiac medications - currently on metoprolol.        HPI: The patient is a 84 year old female with a PMHx of ESRD on HD (F - Dr. Mott), Atrial fibrillation (on Eliquis), HTN, CAD, COPD, previous CVA who presnted to the emergency department for the evaluation of generalized weakness x 3 weeks. Patient was recently evaluated in the emergency department here at [facility] 8/9/24 for the evaluation of shortness of breath with cough and generalized weakness. Chest xray at that time revealed right basilar ground-glass opacities, suspicious for viral/atypical pneumonia vs pulmonary edema. She was discharged home with doxycycline. Prior to that she was admitted here and discharged on 7/19/24 after a admission for bradycardia for which her amiodarone and metoprolol were discontinued per cardiology and she was placed on cardizem. However, upon follow up she was taken off her cardizem and placed back on amiodarone at 100 mg daily by her cardiologist Dr. Thompson. Patient went to HD this am with 1.5L off and patient reported generalized weakness following treatment.  EMS was activated and spoke to patient's son who reports that she was been experiencing weakness and confusion over the past three weeks. She has also been having difficulty controlling her blood pressure and has had some medication changes as of recent.     Patient evaluated in the emergency department - COVID and flu negative. Electrolytes stable. BNP 3400, high sensitivity troponin 226, EKG with normal sinus rhythm, 71 beats per minute with incomplete right bundle-branch block. Septal infarct age undetermined with T-wave abnormality in the inferior and anterior leads. Patient admitted to hospital medicine service for further evaluation.     Past Medical History:   Diagnosis Date    A-fib     Anxiety     Depression     Disorder of kidney and ureter     Encephalopathy acute 1/1/2018    End stage kidney disease 6/17/2017    Gout     Hyperlipidemia     Hypertension     Moderate episode of recurrent major depressive disorder 1/17/2018    Nephropathy hypertensive, stage 5 chronic kidney disease or end stage renal disease 6/17/2017    Obstructive pattern present on pulmonary function testing 7/28/2021    Shows moderate obstruction.    Osteopenia of multiple sites 3/9/2018    Based upon bone density measurements. Patient also has chronic kidney disease.    Stroke 11/2016       Past Surgical History:   Procedure Laterality Date    ANGIOGRAM, CORONARY, WITH LEFT HEART CATHETERIZATION N/A 2/7/2022    Procedure: Angiogram, Coronary, with Left Heart Cath;  Surgeon: Gino Leal MD;  [facility]: [facility] CATH/EP LAB;  Service: Cardiology;  Laterality: N/A;    CARDIAC SURGERY      stents    EYE SURGERY      WRIST SURGERY         Review of patient's allergies indicates:   Allergen Reactions    Cyclobenzaprine     Fish containing products Hives    Peanut Other (See Comments)    Tramadol Itching       No current facility-administered medications on file prior to encounter.     Current Outpatient Medications on  File Prior to Encounter   Medication Sig    amiodarone (PACERONE) 100 MG Tab Take 1 tablet (100 mg total) by mouth once daily.    atorvastatin (LIPITOR) 40 MG tablet Take 40 mg by mouth once daily.    b complex vitamins tablet Take 1 tablet by mouth once daily.    doxycycline (VIBRAMYCIN) 100 MG Cap Take 1 capsule (100 mg total) by mouth 2 (two) times daily. for 10 days    ELIQUIS 2.5 mg Tab Take 2.5 mg by mouth 2 (two) times daily.    latanoprost 0.005 % ophthalmic solution Place 1 drop into both eyes every evening.    metoclopramide HCl (REGLAN) 5 MG tablet Take 1 tablet (5 mg total) by mouth 3 (three) times daily before meals.    midodrine (PROAMATINE) 10 MG tablet Take 1 tablet (10 mg total) by mouth 3 (three) times daily with meals.    ondansetron (ZOFRAN-ODT) 4 MG TbDL Take 1 tablet (4 mg total) by mouth every 6 (six) hours as needed (nausea).    sodium zirconium cyclosilicate (LOKELMA) 10 gram packet Take 1 packet (10 g total) by mouth every Tuesday, Thursday, Saturday, Sunday. Mix entire contents of packet(s) into drinking glass containing 3 tablespoons of water; stir well and drink immediately. Add water and repeat until no powder remains to receive entire dose. (Patient not taking: Reported on 8/12/2024)    VELTASSA 8.4 gram PwPk Take 8.4 g by mouth once daily. (Patient not taking: Reported on 8/12/2024)     Family History       Problem Relation (Age of Onset)    Cancer Father    Heart disease Mother          Tobacco Use    Smoking status: Never    Smokeless tobacco: Never   Substance and Sexual Activity    Alcohol use: No    Drug use: No    Sexual activity: Not Currently     Review of Systems   Constitutional:  Negative for chills and fever.   Respiratory:  Positive for cough and shortness of breath.    Cardiovascular:  Negative for chest pain.   Gastrointestinal:  Negative for abdominal pain, constipation, diarrhea, nausea and vomiting.   Neurological:  Positive for weakness. Negative for  dizziness and light-headedness.     Objective:     Vital Signs (Most Recent):  Temp: 98.5 °F (36.9 °C) (08/12/24 1103)  Pulse: 69 (08/12/24 1331)  Resp: 18 (08/12/24 1054)  BP: (!) 160/74 (08/12/24 1331)  SpO2: 96 % (08/12/24 1342) Vital Signs (24h Range):  Temp:  [98.5 °F (36.9 °C)] 98.5 °F (36.9 °C)  Pulse:  [68-75] 69  Resp:  [18] 18  SpO2:  [95 %-98 %] 96 %  BP: (160-190)/(70-81) 160/74     Weight: 46.3 kg (102 lb)  Body mass index is 16.97 kg/m².     Physical Exam  Constitutional:       General: She is not in acute distress.     Appearance: She is not toxic-appearing.   HENT:      Head: Normocephalic.      Nose: Nose normal.      Mouth/Throat:      Mouth: Mucous membranes are moist.      Pharynx: Oropharynx is clear.   Eyes:      Conjunctiva/sclera: Conjunctivae normal.      Pupils: Pupils are equal, round, and reactive to light.   Neck:      Comments: No JVD  Cardiovascular:      Rate and Rhythm: Normal rate and regular rhythm.      Pulses: Normal pulses.      Heart sounds: Normal heart sounds.   Pulmonary:      Effort: Pulmonary effort is normal.      Breath sounds: Normal breath sounds.   Abdominal:      General: Abdomen is flat. Bowel sounds are normal.      Palpations: Abdomen is soft.   Skin:     General: Skin is warm and dry.      Capillary Refill: Capillary refill takes less than 2 seconds.   Neurological:      General: No focal deficit present.      Mental Status: She is alert. She is disoriented.              CRANIAL NERVES     CN III, IV, VI   Pupils are equal, round, and reactive to light.       Significant Labs: All pertinent labs within the past 24 hours have been reviewed.  Recent Lab Results         08/12/24  1233   08/12/24  1213   08/12/24  1212   08/12/24  1206        Influenza A, Molecular   Negative           Influenza B, Molecular   Negative           Group A Strep, Molecular     Negative  Comment: Arcanobacterium haemolyticum and Beta Streptococcus group C   and G will not be detected by  this test method.  Please order   Throat Culture ([id number]) if suspected.           Albumin       3.8       ALP       62       ALT       15       Anion Gap       8       Appearance, UA Clear             AST       25       Bacteria, UA None             Baso #       0.02       Basophil %       0.4       Bilirubin (UA) Negative             BILIRUBIN TOTAL       0.7  Comment: For infants and newborns, interpretation of results should be based  on gestational age, weight and in agreement with clinical  observations.    Premature Infant recommended reference ranges:  Up to 24 hours.............<8.0 mg/dL  Up to 48 hours............<12.0 mg/dL  3-5 days..................<15.0 mg/dL  6-29 days.................<15.0 mg/dL         BNP       3,476  Comment: Values of less than 100 pg/ml are consistent with non-CHF populations.       BUN       19       Calcium       8.8       Chloride       97       CO2       33       Color, UA Yellow             Creatinine       5.0       Differential Method       Automated       eGFR       8.1       Eos #       0.4       Eos %       7.7       Flu A & B Source   Nasal swab           Glucose       79       Glucose, UA Negative             Gran # (ANC)       3.2       Gran %       60.8       Hematocrit       36.9       Hemoglobin       10.9       Hyaline Casts, UA 0             Immature Grans (Abs)       0.01  Comment: Mild elevation in immature granulocytes is non specific and   can be seen in a variety of conditions including stress response,   acute inflammation, trauma and pregnancy. Correlation with other   laboratory and clinical findings is essential.         Immature Granulocytes       0.2       Ketones, UA Negative             Leukocyte Esterase, UA 2+             Lymph #       0.8       Lymph %       14.9       Magnesium        1.8       MCH       26.0       MCHC       29.5       MCV       88       Microscopic Comment SEE COMMENT  Comment: Other formed elements not mentioned in the  report are not   present in the microscopic examination.                Mono #       0.9       Mono %       16.0       MPV       9.6       NITRITE UA Negative             nRBC       0       Blood, UA 1+             pH, UA 8.0             Phosphorus Level       2.0       Platelet Count       237       Potassium       3.8       PROTEIN TOTAL       7.3       Protein, UA 1+  Comment: Recommend a 24 hour urine protein or a urine   protein/creatinine ratio if globulin induced proteinuria is  clinically suspected.               RBC       4.19       RBC, UA 0             RDW       21.2       SARS-CoV-2 RNA, Amplification, Qual   Negative  Comment: This test utilizes isothermal nucleic acid amplification technology   to   detect the SARS-CoV-2 RdRp nucleic acid segment. The analytical   sensitivity   (limit of detection) is 500 copies/swab.     A POSITIVE result is indicative of the presence of SARS-CoV-2 RNA;   clinical   correlation with patient history and other diagnostic information is   necessary to determine patient infection status.    A NEGATIVE result means that SARS-CoV-2 nucleic acids are not present   above   the limit of detection. A NEGATIVE result should be treated as   presumptive.   It does not rule out the possibility of COVID-19 and should not be   the sole   basis for treatment decisions. If COVID-19 is strongly suspected   based on   clinical and exposure history, re-testing using an alternate   molecular assay   should be considered.     This test is FDA approved.Performance characteristics of this test   has been   independently verified by [facility] in conjunction   with   [facility] Department of Pathology and Laboratory   Medicine.             Sodium       138       Spec Grav UA 1.010             Specimen UA Urine, Catheterized             Squam Epithel, UA 0             Troponin I High Sensitivity       226.3  Comment: Troponin results differ between methods. Do not  use   results between Troponin methods interchangeably.    Alkaline Phospatase levels above 400 U/L may   cause false positive results.    Access hsTnI should not be used for patients taking   Asfotase rosy (Strensiq).  Critical result TNIHS 226.3 pg/mL called to and read back by Elle Cardenas  RN/ed at 12-Aug-2024 13:25 by [facility].  Result Rechecked         TSH       3.748       UROBILINOGEN UA Negative             WBC, UA 4             WBC       5.31               Significant Imaging: I have reviewed all pertinent imaging results/findings within the past 24 hours.  I have reviewed and interpreted all pertinent imaging results/findings within the past 24 hours.  Assessment/Plan:     * Acute metabolic encephalopathy  Patient presents with generalized confusion with three week history of confusion  Recently treated for possible viral pneumonia continue doxy   Recurrent episodes of confusion, frequent hospitalization, chronic renal disease and debility - will consult palliative care   UA without bacteria  Check head CT without contrast   Supportive care     Demand ischemia  Initial troponin 226  EKG normal sinus rhythm, 71 beats per minute with incomplete right bundle-branch block. Septal infarct age undetermined with T-wave abnormality in the inferior and anterior leads.   Patient with history of ESRD - last troponin from 8/9/24 34   Last echo from 6/28/24 with EF 64%, mild AS, mod MR, no WMA   Nuclear stress 6/28/24 without reversible ischemia   Cardiology consulted   Trend troponin   Continue telemetry monitoring     ESRD (end stage renal disease)  Patient ESRD HD MWF  Last HD Monday 8/12/24 with 1.5L off  Lytes stable  Daily BMP  Renal diet  Renal dose medication  Consult nephrology Dr. Mott     Atrial fibrillation  Patient currently in NSR  Continue amiodarone 100 mg daily  Continue Eliquis 2.5 mg bid   Continue telemetry monitoring   Cardiology consulted     Benign hypertension with CKD (chronic kidney  disease) stage V  Chronic, controlled.  Latest blood pressure and vitals reviewed-     Temp:  [98.5 °F (36.9 °C)]   Pulse:  [68-75]   Resp:  [18]   BP: (160-190)/(70-81)   SpO2:  [95 %-98 %] .   Home meds for hypertension were reviewed and noted below-    While in the hospital, will manage blood pressure as follows; Continue home antihypertensive regimen    Will utilize p.r.n. blood pressure medication only if patient's blood pressure greater than 180/110 and she develops symptoms such as worsening chest pain or shortness of breath.        VTE Risk Mitigation (From admission, onward)           Ordered     apixaban tablet 2.5 mg  2 times daily         08/12/24 1613     IP VTE HIGH RISK PATIENT  Once         08/12/24 1536     Place sequential compression device  Until discontinued         08/12/24 1536                         On 08/12/2024, patient should be placed in hospital observation services under my care in collaboration with Dr. Rowe.           KELBY Montoya  Department of Hospital Medicine  [facility] - Emergency Dept

## 2024-08-12 NOTE — PHARMACY MED REC
"Admission Medication History     The home medication history was taken by Maryanne Smyth.    You may go to "Admission" then "Reconcile Home Medications" tabs to review and/or act upon these items.     The home medication list has been updated by the Pharmacy department.   Please read ALL comments highlighted in yellow.   Please address this information as you see fit.    Feel free to contact us if you have any questions or require assistance.          Medications listed below were obtained from: Patient/family and Analytic software- [employer]  No current facility-administered medications on file prior to encounter.     Current Outpatient Medications on File Prior to Encounter   Medication Sig Dispense Refill    amiodarone (PACERONE) 100 MG Tab Take 1 tablet (100 mg total) by mouth once daily. 30 tablet 0    atorvastatin (LIPITOR) 40 MG tablet Take 40 mg by mouth once daily.      b complex vitamins tablet Take 1 tablet by mouth once daily.      doxycycline (VIBRAMYCIN) 100 MG Cap Take 1 capsule (100 mg total) by mouth 2 (two) times daily. for 10 days 20 capsule 0    ELIQUIS 2.5 mg Tab Take 2.5 mg by mouth 2 (two) times daily.      latanoprost 0.005 % ophthalmic solution Place 1 drop into both eyes every evening.      metoclopramide HCl (REGLAN) 5 MG tablet Take 1 tablet (5 mg total) by mouth 3 (three) times daily before meals. 30 tablet 1    midodrine (PROAMATINE) 10 MG tablet Take 1 tablet (10 mg total) by mouth 3 (three) times daily with meals. 270 tablet 0    ondansetron (ZOFRAN-ODT) 4 MG TbDL Take 1 tablet (4 mg total) by mouth every 6 (six) hours as needed (nausea). 30 tablet 5    sodium zirconium cyclosilicate (LOKELMA) 10 gram packet Take 1 packet (10 g total) by mouth every Tuesday, Thursday, Saturday, Sunday. Mix entire contents of packet(s) into drinking glass containing 3 tablespoons of water; stir well and drink immediately. Add water and repeat until no powder remains to receive entire dose. (Patient not " taking: Reported on 8/12/2024) 16 packet 0    VELTASSA 8.4 gram PwPk Take 8.4 g by mouth once daily. (Patient not taking: Reported on 8/12/2024)         Potential issues to be addressed PRIOR TO DISCHARGE  Patient reported not taking the following medications: (Lokelma& Veltassa). These medications remain on the home medication list. Please address accordingly.     Maryanne Smyth  EXT [id number]                  .

## 2024-08-12 NOTE — ASSESSMENT & PLAN NOTE
Patient ESRD HD MWF  Last HD Monday 8/12/24 with 1.5L off  Lytes stable  Daily BMP  Renal diet  Renal dose medication  Consult nephrology Dr. Mott

## 2024-08-13 PROBLEM — Z71.89 ADVANCE CARE PLANNING: Status: ACTIVE | Noted: 2024-08-13

## 2024-08-13 LAB
ALBUMIN SERPL BCP-MCNC: 3.6 G/DL (ref 3.5–5.2)
ALP SERPL-CCNC: 59 U/L (ref 55–135)
ALT SERPL W/O P-5'-P-CCNC: 16 U/L (ref 10–44)
ANION GAP SERPL CALC-SCNC: 12 MMOL/L (ref 8–16)
AST SERPL-CCNC: 24 U/L (ref 10–40)
BASOPHILS # BLD AUTO: 0.03 K/UL (ref 0–0.2)
BASOPHILS NFR BLD: 0.7 % (ref 0–1.9)
BILIRUB SERPL-MCNC: 0.7 MG/DL (ref 0.1–1)
BUN SERPL-MCNC: 30 MG/DL (ref 8–23)
CALCIUM SERPL-MCNC: 9 MG/DL (ref 8.7–10.5)
CHLORIDE SERPL-SCNC: 97 MMOL/L (ref 95–110)
CO2 SERPL-SCNC: 31 MMOL/L (ref 23–29)
CREAT SERPL-MCNC: 6.7 MG/DL (ref 0.5–1.4)
DIFFERENTIAL METHOD BLD: ABNORMAL
EOSINOPHIL # BLD AUTO: 0.5 K/UL (ref 0–0.5)
EOSINOPHIL NFR BLD: 10.4 % (ref 0–8)
ERYTHROCYTE [DISTWIDTH] IN BLOOD BY AUTOMATED COUNT: 21.2 % (ref 11.5–14.5)
EST. GFR  (NO RACE VARIABLE): 5.7 ML/MIN/1.73 M^2
GLUCOSE SERPL-MCNC: 60 MG/DL (ref 70–110)
GLUCOSE SERPL-MCNC: 74 MG/DL (ref 70–110)
HCT VFR BLD AUTO: 36.7 % (ref 37–48.5)
HGB BLD-MCNC: 10.6 G/DL (ref 12–16)
IMM GRANULOCYTES # BLD AUTO: 0 K/UL (ref 0–0.04)
IMM GRANULOCYTES NFR BLD AUTO: 0 % (ref 0–0.5)
LYMPHOCYTES # BLD AUTO: 0.8 K/UL (ref 1–4.8)
LYMPHOCYTES NFR BLD: 16.6 % (ref 18–48)
MAGNESIUM SERPL-MCNC: 1.9 MG/DL (ref 1.6–2.6)
MCH RBC QN AUTO: 25.8 PG (ref 27–31)
MCHC RBC AUTO-ENTMCNC: 28.9 G/DL (ref 32–36)
MCV RBC AUTO: 89 FL (ref 82–98)
MONOCYTES # BLD AUTO: 0.8 K/UL (ref 0.3–1)
MONOCYTES NFR BLD: 18.4 % (ref 4–15)
NEUTROPHILS # BLD AUTO: 2.4 K/UL (ref 1.8–7.7)
NEUTROPHILS NFR BLD: 53.9 % (ref 38–73)
NRBC BLD-RTO: 0 /100 WBC
PHOSPHATE SERPL-MCNC: 3 MG/DL (ref 2.7–4.5)
PLATELET # BLD AUTO: 242 K/UL (ref 150–450)
PMV BLD AUTO: 10.1 FL (ref 9.2–12.9)
POTASSIUM SERPL-SCNC: 5 MMOL/L (ref 3.5–5.1)
PROT SERPL-MCNC: 6.7 G/DL (ref 6–8.4)
RBC # BLD AUTO: 4.11 M/UL (ref 4–5.4)
SODIUM SERPL-SCNC: 140 MMOL/L (ref 136–145)
WBC # BLD AUTO: 4.51 K/UL (ref 3.9–12.7)

## 2024-08-13 PROCEDURE — 99223 1ST HOSP IP/OBS HIGH 75: CPT | Mod: ,,, | Performed by: INTERNAL MEDICINE

## 2024-08-13 PROCEDURE — 84100 ASSAY OF PHOSPHORUS: CPT | Performed by: NURSE PRACTITIONER

## 2024-08-13 PROCEDURE — 63600175 PHARM REV CODE 636 W HCPCS: Performed by: NURSE PRACTITIONER

## 2024-08-13 PROCEDURE — 25000003 PHARM REV CODE 250: Performed by: NURSE PRACTITIONER

## 2024-08-13 PROCEDURE — 80053 COMPREHEN METABOLIC PANEL: CPT | Performed by: NURSE PRACTITIONER

## 2024-08-13 PROCEDURE — 97161 PT EVAL LOW COMPLEX 20 MIN: CPT

## 2024-08-13 PROCEDURE — 85025 COMPLETE CBC W/AUTO DIFF WBC: CPT | Performed by: NURSE PRACTITIONER

## 2024-08-13 PROCEDURE — 36415 COLL VENOUS BLD VENIPUNCTURE: CPT | Performed by: NURSE PRACTITIONER

## 2024-08-13 PROCEDURE — 21400001 HC TELEMETRY ROOM

## 2024-08-13 PROCEDURE — 83735 ASSAY OF MAGNESIUM: CPT | Performed by: NURSE PRACTITIONER

## 2024-08-13 RX ADMIN — DOXYCYCLINE HYCLATE 100 MG: 100 CAPSULE ORAL at 08:08

## 2024-08-13 RX ADMIN — APIXABAN 2.5 MG: 2.5 TABLET, FILM COATED ORAL at 08:08

## 2024-08-13 RX ADMIN — LATANOPROST 1 DROP: 50 SOLUTION OPHTHALMIC at 08:08

## 2024-08-13 RX ADMIN — DOXYCYCLINE HYCLATE 100 MG: 100 CAPSULE ORAL at 09:08

## 2024-08-13 RX ADMIN — SODIUM ZIRCONIUM CYCLOSILICATE 10 G: 10 POWDER, FOR SUSPENSION ORAL at 05:08

## 2024-08-13 RX ADMIN — METOCLOPRAMIDE 5 MG: 5 TABLET ORAL at 05:08

## 2024-08-13 RX ADMIN — AMIODARONE HYDROCHLORIDE 100 MG: 100 TABLET ORAL at 09:08

## 2024-08-13 RX ADMIN — APIXABAN 2.5 MG: 2.5 TABLET, FILM COATED ORAL at 09:08

## 2024-08-13 RX ADMIN — HYDRALAZINE HYDROCHLORIDE 10 MG: 20 INJECTION INTRAMUSCULAR; INTRAVENOUS at 08:08

## 2024-08-13 RX ADMIN — ATORVASTATIN CALCIUM 40 MG: 40 TABLET, FILM COATED ORAL at 08:08

## 2024-08-13 NOTE — NURSING
Nurses Note -- 4 Eyes      8/13/2024   1:14 AM      Skin assessed during: Admit      [x] No Altered Skin Integrity Present    [x]Prevention Measures Documented      [] Yes- Altered Skin Integrity Present or Discovered   [] LDA Added if Not in Epic (Describe Wound)   [] New Altered Skin Integrity was Present on Admit and Documented in LDA   [] Wound Image Taken    Wound Care Consulted? No    Attending Nurse:  Marco MELENDEZ RN    Second RN/Staff Member:   Mery OVIEDO Lpn

## 2024-08-13 NOTE — CONSULTS
Critical access hospital  Department of Cardiology  Consult Note      PATIENT NAME: Tyra Isaac  MRN: [id number]  TODAY'S DATE: 08/13/2024  ADMIT DATE: 8/12/2024                          CONSULT REQUESTED BY: German Michael DO    SUBJECTIVE     PRINCIPAL PROBLEM: Acute metabolic encephalopathy      REASON FOR CONSULT:  Troponin 226, inferior T-wave inversion, generalized weakness, HD patient      HPI:    The patient is a 84 year old female with a PMHx of ESRD on HD ([facility] - Dr. Mott), Atrial fibrillation (on Eliquis), HTN, CAD, COPD, previous CVA who presnted to the emergency department for the evaluation of generalized weakness x 3 weeks.  Patient reports that she can no longer hold a beverage or feed herself well due weakness.  Elevated troponin this admission 226.  Chronic T-wave inversion in inferior, anterior lateral leads.  Chronically elevated BNP, increased more than usual.  Troponin HS also chronically elevated but higher than baseline.  Denies chest pain.  Troponin HS now downtrending.  Chest x-ray this admission suggestive of atelectasis versus aspiration/pneumonia or edema in bilateral lung bases.  Patient recently had a nuclear stress test performed that was negative for reversible ischemia.  Last echocardiogram 06/28/2024 showed EF of 60-65% with severe mitral stenosis.      FROM H&P  HPI:  The patient is a 84 year old female with a PMHx of ESRD on HD (MARGOT Mott), Atrial fibrillation (on Eliquis), HTN, CAD, COPD, previous CVA who presnted to the emergency department for the evaluation of generalized weakness x 3 weeks. Patient was recently evaluated in the emergency department here at [facility] 8/9/24 for the evaluation of shortness of breath with cough and generalized weakness. Chest xray at that time revealed right basilar ground-glass opacities, suspicious for viral/atypical pneumonia vs pulmonary edema. She was discharged home with doxycycline. Prior to that she was admitted here and  discharged on 7/19/24 after a admission for bradycardia for which her amiodarone and metoprolol were discontinued per cardiology and she was placed on cardizem. However, upon follow up she was taken off her cardizem and placed back on amiodarone at 100 mg daily by her cardiologist Dr. Thompson. Patient went to HD this am with 1.5L off and patient reported generalized weakness following treatment. EMS was activated and spoke to patient's son who reports that she was been experiencing weakness and confusion over the past three weeks. She has also been having difficulty controlling her blood pressure and has had some medication changes as of recent.      Patient evaluated in the emergency department - COVID and flu negative. Electrolytes stable. BNP 3400, high sensitivity troponin 226, EKG with normal sinus rhythm, 71 beats per minute with incomplete right bundle-branch block. Septal infarct age undetermined with T-wave abnormality in the inferior and anterior leads. Patient admitted to hospital medicine service for further evaluation.               Overview/Hospital Course:  Patient and admitted to hospital medicine service on telemetry and evaluated. She continued to have tremors and facial jerking. No further generalized weakness. Patient has remained hemodynamically stable. CBC with mild anemia secondary for ESRD, CMP with stable electrolytes and consistent with ESRD. Tropnin trended down from 226 on admit to 170. CT head without contrast with no interval changes from previous MRI in 11/2022, chronic age-related changes, small foci of lacunar infarction that is stable from previous imaging. with Per further chart review, patient was prescribed Reglan for possible gastroparesis in July 2024. Will discontinue Reglan at this time as it may be contributing to her tremors as well as facial movements and confusion. Maintained on HD schedule per nephrology. PT/OT consulted.      Interval History:  Patient evaluated at  bedside. No complaints. No family at bedside. NADN.         Review of patient's allergies indicates:   Allergen Reactions    Cyclobenzaprine     Fish containing products Hives    Peanut Other (See Comments)    Tramadol Itching       Past Medical History:   Diagnosis Date    A-fib     Anxiety     Depression     Disorder of kidney and ureter     Encephalopathy acute 1/1/2018    End stage kidney disease 6/17/2017    Gout     Hyperlipidemia     Hypertension     Moderate episode of recurrent major depressive disorder 1/17/2018    Nephropathy hypertensive, 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    Osteopenia of multiple sites 3/9/2018    Based upon bone density measurements. Patient also has chronic kidney disease.    Stroke 11/2016     Past Surgical History:   Procedure Laterality Date    ANGIOGRAM, CORONARY, WITH LEFT HEART CATHETERIZATION N/A 2/7/2022    Procedure: Angiogram, Coronary, with Left Heart Cath;  Surgeon: Gino Leal MD;  Location: [facility] CATH/EP LAB;  Service: Cardiology;  Laterality: N/A;    CARDIAC SURGERY      stents    EYE SURGERY      WRIST SURGERY       Social History     Tobacco Use    Smoking status: Never    Smokeless tobacco: Never   Substance Use Topics    Alcohol use: No    Drug use: No        REVIEW OF SYSTEMS    As mentioned in HPI    OBJECTIVE     VITAL SIGNS (Most Recent)  Temp: 98.4 °F (36.9 °C) (08/13/24 1157)  Pulse: 69 (08/13/24 1157)  Resp: 20 (08/13/24 1157)  BP: (!) 158/73 (08/13/24 1157)  SpO2: 98 % (08/13/24 1157)    VENTILATION STATUS  Resp: 20 (08/13/24 1157)  SpO2: 98 % (08/13/24 1157)           I & O (Last 24H):  Intake/Output Summary (Last 24 hours) at 8/13/2024 1317  Last data filed at 8/13/2024 1301  Gross per 24 hour   Intake 410 ml   Output --   Net 410 ml       WEIGHTS  Wt Readings from Last 3 Encounters:   08/13/24 0115 47.6 kg (104 lb 15  oz)   08/12/24 1054 46.3 kg (102 lb)   08/09/24 1154 46.3 kg (102 lb)   07/30/24 1020 44.9 kg (99 lb)       PHYSICAL EXAM    CONSTITUTIONAL: NAD, chronically ill-appearing elderly female, thin frail  HEENT: Normocephalic. No pallor  NECK: no JVD  LUNGS:  Diminished lower lobes  HEART: regular rate and rhythm, S1, S2 normal, + murmur   ABDOMEN: soft, non-tender, bowel sounds normal  EXTREMITIES: No edema  SKIN: No rash  NEURO: AAO X 3  PSYCH:  Flat sad affect    HOME MEDICATIONS:  No current facility-administered medications on file prior to encounter.     Current Outpatient Medications on File Prior to Encounter   Medication Sig Dispense Refill    amiodarone (PACERONE) 100 MG Tab Take 1 tablet (100 mg total) by mouth once daily. 30 tablet 0    atorvastatin (LIPITOR) 40 MG tablet Take 40 mg by mouth once daily.      b complex vitamins tablet Take 1 tablet by mouth once daily.      doxycycline (VIBRAMYCIN) 100 MG Cap Take 1 capsule (100 mg total) by mouth 2 (two) times daily. for 10 days 20 capsule 0    ELIQUIS 2.5 mg Tab Take 2.5 mg by mouth 2 (two) times daily.      latanoprost 0.005 % ophthalmic solution Place 1 drop into both eyes every evening.      metoclopramide HCl (REGLAN) 5 MG tablet Take 1 tablet (5 mg total) by mouth 3 (three) times daily before meals. 30 tablet 1    midodrine (PROAMATINE) 10 MG tablet Take 1 tablet (10 mg total) by mouth 3 (three) times daily with meals. 270 tablet 0    ondansetron (ZOFRAN-ODT) 4 MG TbDL Take 1 tablet (4 mg total) by mouth every 6 (six) hours as needed (nausea). 30 tablet 5    sodium zirconium cyclosilicate (LOKELMA) 10 gram packet Take 1 packet (10 g total) by mouth every Tuesday, Thursday, Saturday, Sunday. Mix entire contents of packet(s) into drinking glass containing 3 tablespoons of water; stir well and drink immediately. Add water and repeat until no powder remains to receive entire dose. (Patient not taking: Reported on 8/12/2024) 16 packet 0    VELTASSA 8.4 gram  "PwPk Take 8.4 g by mouth once daily. (Patient not taking: Reported on 8/12/2024)         SCHEDULED MEDS:   amiodarone  100 mg Oral Daily    apixaban  2.5 mg Oral BID    atorvastatin  40 mg Oral Daily    doxycycline  100 mg Oral BID    [START ON 8/14/2024] epoetin rosy-epbx  50 Units/kg Subcutaneous Every Mon, Wed, Fri    latanoprost  1 drop Both Eyes QHS    sodium zirconium cyclosilicate  10 g Oral Every Tues, Thurs, Sat, Sun       CONTINUOUS INFUSIONS:    PRN MEDS:  Current Facility-Administered Medications:     acetaminophen, 650 mg, Oral, Q8H PRN    acetaminophen, 650 mg, Oral, Q4H PRN    aluminum-magnesium hydroxide-simethicone, 30 mL, Oral, QID PRN    dextrose 50%, 12.5 g, Intravenous, PRN    dextrose 50%, 25 g, Intravenous, PRN    glucagon (human recombinant), 1 mg, Intramuscular, PRN    glucose, 16 g, Oral, PRN    glucose, 24 g, Oral, PRN    hydrALAZINE, 10 mg, Intravenous, Q6H PRN    melatonin, 6 mg, Oral, Nightly PRN    naloxone, 0.02 mg, Intravenous, PRN    ondansetron, 4 mg, Intravenous, Q6H PRN    LABS AND DIAGNOSTICS     CBC LAST 3 DAYS  Recent Labs   Lab 08/09/24  1318 08/12/24  1206 08/13/24  0404   WBC 4.72 5.31 4.51   RBC 4.68 4.19 4.11   HGB 11.9* 10.9* 10.6*   HCT 41.6 36.9* 36.7*   MCV 89 88 89   MCH 25.4* 26.0* 25.8*   MCHC 28.6* 29.5* 28.9*   RDW 21.3* 21.2* 21.2*    237 242   MPV 9.8 9.6 10.1   GRAN 47.5  2.2 60.8  3.2 53.9  2.4   LYMPH 24.4  1.2 14.9*  0.8* 16.6*  0.8*   MONO 16.3*  0.8 16.0*  0.9 18.4*  0.8   BASO 0.03 0.02 0.03   NRBC 0 0 0       COAGULATION LAST 3 DAYS  No results for input(s): "LABPT", "INR", "APTT" in the last 168 hours.    CHEMISTRY LAST 3 DAYS  Recent Labs   Lab 08/09/24  1318 08/12/24  1206 08/13/24  0404    138 140   K 3.4* 3.8 5.0   CL 94* 97 97   CO2 36* 33* 31*   ANIONGAP 9 8 12   BUN 14 19 30*   CREATININE 4.2* 5.0* 6.7*   GLU 83 79 60*   CALCIUM 9.1 8.8 9.0   MG 1.8 1.8 1.9   ALBUMIN 4.4 3.8 3.6   PROT 8.4 7.3 6.7   ALKPHOS 74 62 59   ALT " "12 15 16   AST 15 25 24   BILITOT 0.6 0.7 0.7       CARDIAC PROFILE LAST 3 DAYS  Recent Labs   Lab 08/09/24  1318 08/12/24  1206 08/12/24  1640   BNP 1,130* 3,476*  --    TROPONINIHS 34.9* 226.3* 177.0*       ENDOCRINE LAST 3 DAYS  Recent Labs   Lab 08/12/24  1206   TSH 3.748       LAST ARTERIAL BLOOD GAS  ABG  No results for input(s): "PH", "PO2", "PCO2", "HCO3", "BE" in the last 168 hours.    LAST 7 DAYS MICROBIOLOGY   Microbiology Results (last 7 days)       Procedure Component Value Units Date/Time    Group A Strep, Molecular [[id number]] Collected: 08/12/24 1212    Order Status: Completed Specimen: Throat Updated: 08/12/24 1229     Group A Strep, Molecular Negative     Comment: Arcanobacterium haemolyticum and Beta Streptococcus group C   and G will not be detected by this test method.  Please order   Throat Culture ([id number]) if suspected.                 MOST RECENT IMAGING  CT Head Without Contrast  Narrative: EXAMINATION:  CT HEAD WITHOUT CONTRAST    CLINICAL HISTORY:  Mental status change, unknown cause;    TECHNIQUE:  5 mm low dose non-contrast contiguous axial images were acquired through the head.  Subsequently, 2 dimensional coronal and sagittal reconstructed images were generated from the source data.    COMPARISON:  MRI brain-11/11/2022    FINDINGS:  The brain is normally formed with preserved gray-white matter junction differentiation. No evidence of acute/recent major vascular territory cerebral infarction, parenchymal hemorrhage, or intra-axial mass.  There are confluent areas of periventricular white matter hypoattenuation compatible with age-appropriate chronic microvascular ischemic changes.  There is age-appropriate cerebral volume loss with widening of the CSF spaces over both cerebral convexities.  There is a small focus of remote lacunar infarction present within the lateral aspect of the left caudate head, similar to the prior examination.  There are small foci of remote lacunar infarction " within the left basal ganglia, unchanged.    No hydrocephalus.  No effacement of the skull-base cisterns.  No extra-axial fluid collections or blood products.    There is minimal mucoperiosteal thickening noted within the dependent right maxillary sinus.  The visualized orbits are unremarkable.  The bony calvarium and visualized facial bones show no acute abnormality.  Impression: 1. No acute intracranial abnormality appreciated.  No interval detrimental change in the imaging appearance of the brain when compared with the prior MRI of the brain performed 11/11/2022.  2. Minimal right maxillary sinus disease.  3. Age-appropriate supratentorial cerebral volume loss and chronic microvascular ischemic changes within the periventricular white matter of the supratentorial brain.  4. Small foci of remote lacunar infarction within the lateral aspect of the left caudate head and left basal ganglia, unchanged when compared to the prior study..    Electronically signed by: Eyad Kimball MD  Date:    08/12/2024  Time:    17:32  X-Ray Chest AP Portable  Narrative: CLINICAL HISTORY:  ([id number] y/o  (1940) F    sob;    TECHNIQUE:  (A#[id number], exam time 8/12/2024 12:11)    XR CHEST AP PORTABLE [id number]    COMPARISON:  Radiograph from 08/09/2024.    FINDINGS:  There are mild faint linear opacities at the lung bases suggestive of atelectasis/scarring  aspiration/pneumonia or trace edema in the appropriate clinical setting.  No pneumothorax is identified. The heart is mildly enlarged. Atheromatous calcifications are seen at the aortic arch. Osseous structures show degenerative changes in the spine. The visualized upper abdomen is unremarkable.  Impression: Unchanged radiograph of the chest when accounting for differences in imaging technique.    Electronically signed by: Rahul Palacio  Date:    08/12/2024  Time:    12:14      ECHOCARDIOGRAM RESULTS (last 5)  Results for orders placed during the hospital encounter of  06/27/24    Echo    Interpretation Summary    Left Ventricle: The left ventricle is normal in size. Normal wall thickness. There is concentric remodeling. There is normal systolic function with a visually estimated ejection fraction of 60 - 65%. Biplane (2D) method of discs ejection fraction is 64%.    Right Ventricle: Normal right ventricular cavity size. Wall thickness is normal. Systolic function is normal.    Left Atrium: Left atrium is mildly dilated.    Aortic Valve: The aortic valve is a trileaflet valve. Mildly calcified left, right and noncoronary cusps. Mildly restricted motion. There is mild stenosis. Aortic valve area by VTI is 1.80 cm². Aortic valve peak velocity is 1.22 m/s. Mean gradient is 3 mmHg. The dimensionless index is 0.79.    Mitral Valve: There is moderate mitral annular calcification present. There is severe stenosis. The mean pressure gradient across the mitral valve is 9 mmHg at a heart rate of  bpm. There is mild regurgitation with a centrally directed jet.      Results for orders placed during the hospital encounter of 01/04/24    Echo    Interpretation Summary    Left Ventricle: The left ventricle is normal in size. Normal wall thickness. Normal wall motion. There is normal systolic function with a visually estimated ejection fraction of 60 - 65%. There is diastolic dysfunction.    Right Ventricle: Normal right ventricular cavity size. Wall thickness is normal. Right ventricle wall motion  is normal. Systolic function is normal.    Left Atrium: Left atrium is severely dilated.    Mitral Valve: There is moderate bileaflet sclerosis. There is moderate mitral annular calcification present. There is mild to moderate regurgitation with a centrally directed jet.    Tricuspid Valve: There is mild to moderate regurgitation.    Pulmonary Artery: There is mild pulmonary hypertension. The estimated pulmonary artery systolic pressure is 41 mmHg.    IVC/SVC: Normal venous pressure at 3  mmHg.      Results for orders placed during the hospital encounter of 03/20/23    Echo    Interpretation Summary  · The left ventricle is normal in size with moderate concentric hypertrophy and normal systolic function.  · The estimated ejection fraction is 57%.  · Normal left ventricular diastolic function.  · Normal right ventricular size with normal right ventricular systolic function.  · Moderate to severe tricuspid regurgitation.  · Mild-to-moderate mitral regurgitation.  · Moderate left atrial enlargement.  · Mild right atrial enlargement.  · There is mild pulmonary hypertension.      Results for orders placed during the hospital encounter of 03/06/23    Echo    Interpretation Summary  · The left ventricle is normal in size with normal systolic function.  · The estimated ejection fraction is 60%.  · Grade II left ventricular diastolic dysfunction.  · Normal right ventricular size with normal right ventricular systolic function.  · Moderate left atrial enlargement.  · Mild-to-moderate mitral regurgitation.  · Moderate to severe tricuspid regurgitation.  · There is mild pulmonary hypertension.  · There is mild aortic valve stenosis.  · Aortic valve area is 1.78 cm2; peak velocity is 2.01 m/s; mean gradient is 9 mmHg.      Results for orders placed during the hospital encounter of 01/26/22    Echo    Interpretation Summary  · The left ventricle is normal in size with mild concentric hypertrophy and normal systolic function.  · The estimated ejection fraction is 65%.  · Grade II left ventricular diastolic dysfunction.  · Atrial fibrillation not observed.  · Normal right ventricular size with normal right ventricular systolic function.  · Moderate to severe left atrial enlargement.  · Mild right atrial enlargement.  · Mild mitral regurgitation.  · Mild to moderate tricuspid regurgitation.  · Normal central venous pressure (3 mmHg).  · The estimated PA systolic pressure is 36 mmHg.  · Mildly elevated gradient  accross mitral valve of around 6 mmHg at HR of 64 bpm. MVA by pressure half time is normal, however this can be inaccurate.      CURRENT/PREVIOUS VISIT EKG  Results for orders placed or performed during the hospital encounter of 08/09/24   EKG 12-lead    Collection Time: 08/12/24 10:57 AM   Result Value Ref Range    QRS Duration 92 ms    [facility] QTC Calculation 458 ms    Narrative    Test Reason : R03.0,    Vent. Rate : 071 BPM     Atrial Rate : 071 BPM     P-R Int : 166 ms          QRS Dur : 092 ms      QT Int : 422 ms       P-R-T Axes : 080 041 -27 degrees     QTc Int : 458 ms    Normal sinus rhythm  Incomplete right bundle branch block  Minimal voltage criteria for LVH, may be normal variant ( [place] product )  Septal infarct ,age undetermined  T wave abnormality, consider inferior ischemia  T wave abnormality, consider anterolateral ischemia  Abnormal ECG  When compared with ECG of 16-JUL-2024 10:28,  Significant changes have occurred    Referred By: [email or web address]   SELF           Confirmed By:            ASSESSMENT/PLAN:     Active Hospital Problems    Diagnosis    *Acute metabolic encephalopathy    ESRD (end stage renal disease)    Demand ischemia    Atrial fibrillation    Benign hypertension with CKD (chronic kidney disease) stage V       ASSESSMENT & PLAN:     Elevated troponin HS  Acute on chronic HFpEF  PAF  ESRD on HD  Hypertension  Chronic anemia  Severe mitral stenosis    RECOMMENDATIONS:    Chronically elevated troponin HS, increased from baseline but downtrending.  Denies chest pain.  No acute STT changes on EKG.  Chronic T-wave inversion in anterior and inferior lateral leads.  Recent echocardiogram showed normal systolic function.  She has known severe mitral stenosis.  Elevated troponin could be secondary to volume overload as BNP is also chronically elevated but higher than usual.  Elevated troponin could also be secondary due to uncontrolled hypertension this admission.  Recent nuclear stress test  negative for reversible ischemia.  Would not recommend further ischemic evaluation at this time. We will defer to Nephrology for management of fluid removal with dialysis.  Patient was a poor candidate for intervention for mitral stenosis.  Recommend medical management.  Currently in sinus rhythm.  Recommend maintaining the same.  Continue amiodarone 100 mg daily.  Continue low-dose Eliquis 2.5 mg b.i.d..  Recommend optimizing antihypertensive regimen this admission. Would recommend low dose beta blocker metoprolol tartrate 12.5 mg BID.  Patient may benefit from palliative care consultation as well.   Thank you for the consult.  We will follow.    Debbie Barney NP  Department of Cardiology  Date of Service: 08/13/2024        I have personally interviewed and examined the patient, I have reviewed the Nurse Practitioner's history and physical, assessment, and plan. I agree with the findings and plan.    1. Patient had cardiac catheterization and coronary angiography in 2022 essentially she had patent tortuous coronary arteries.    2. Fluid and volume overload underlying renal failure with a BUN of 30 and creatinine of 6.7.    The elevation of troponin is most likely secondary due to underlying renal insufficiency.  And also due to acute decompensation of the heart.  3. Patient is currently on hemodialysis and further recommendations as per Nephrology for fluid management.  4. No further ischemic workup necessary at this time.  5. Patient has significant mitral annular calcification with severe mitral stenosis, given the age and underlying comorbid condition not a candidate for mitral valve surgery.  Continue conservative treatment at the present time.  Agree with consultation with palliative care.  6. Thank you for the consultation will sign off for now and will be available as needed.        Gino Leal M.D.  Department of Cardiology  Date of Service: 08/13/2024

## 2024-08-13 NOTE — CONSULTS
INPATIENT NEPHROLOGY CONSULT   [facility] NEPHROLOGY    Tyra Isaac  08/13/2024    Reason for consultation:  esrd    Chief Complaint:   Chief Complaint   Patient presents with    Generalized Weakness     Patient arrived to dialysis with generalized weakness - dialysis completed with 1.5L removed then EMS called for weakness - EMS talked to patient's son and stated that patient has been having generalized weakness and confusion for the past 3 weeks - hx of bradycardia and afib - pt's son states that BP has been difficult to control and has been trying different cardiac medications - currently on metoprolol.          History of Present Illness:     Per H and P  The patient is a 84 year old female with a PMHx of ESRD on HD ([facility] - Dr. Mott), Atrial fibrillation (on Eliquis), HTN, CAD, COPD, previous CVA who presnted to the emergency department for the evaluation of generalized weakness x 3 weeks. Patient was recently evaluated in the emergency department here at [facility] 8/9/24 for the evaluation of shortness of breath with cough and generalized weakness. Chest xray at that time revealed right basilar ground-glass opacities, suspicious for viral/atypical pneumonia vs pulmonary edema. She was discharged home with doxycycline. Prior to that she was admitted here and discharged on 7/19/24 after a admission for bradycardia for which her amiodarone and metoprolol were discontinued per cardiology and she was placed on cardizem. However, upon follow up she was taken off her cardizem and placed back on amiodarone at 100 mg daily by her cardiologist Dr. Thompson. Patient went to HD this am with 1.5L off and patient reported generalized weakness following treatment. EMS was activated and spoke to patient's son who reports that she was been experiencing weakness and confusion over the past three weeks. She has also been having difficulty controlling her blood pressure and has had some medication changes as of recent.       Patient evaluated in the emergency department - COVID and flu negative. Electrolytes stable. BNP 3400, high sensitivity troponin 226, EKG with normal sinus rhythm, 71 beats per minute with incomplete right bundle-branch block. Septal infarct age undetermined with T-wave abnormality in the inferior and anterior leads. Patient admitted to hospital medicine service for further evaluation        Plan of Care:       Assessment:    esrd  --continue dialysis per routine  --fluid restrict  --renal dose medication per routine  --continue outpt medication  --continue binders with meals    Anemia  --erythropoiesis stimulating agent with renal replacement therapy    Hyperphosphatemia  --renal diet  --continue binders    Hypertension  --uf with hd  --fluid restrict  --low salt diet  --continue home medication           Thank you for allowing us to participate in this patient's care. We will continue to follow.    Vital Signs:  Temp Readings from Last 3 Encounters:   08/13/24 98 °F (36.7 °C) (Oral)   08/09/24 98.2 °F (36.8 °C) (Oral)   07/30/24 98.6 °F (37 °C)       Pulse Readings from Last 3 Encounters:   08/13/24 69   08/09/24 68   07/30/24 (P) 65       BP Readings from Last 3 Encounters:   08/13/24 (!) 149/70   08/09/24 (!) 144/68   07/30/24 (!) 118/52       Weight:  Wt Readings from Last 3 Encounters:   08/13/24 47.6 kg (104 lb 15 oz)   08/09/24 46.3 kg (102 lb)   07/30/24 44.9 kg (99 lb)       Past Medical & Surgical History:  Past Medical History:   Diagnosis Date    A-fib     Anxiety     Depression     Disorder of kidney and ureter     Encephalopathy acute 1/1/2018    End stage kidney disease 6/17/2017    Gout     Hyperlipidemia     Hypertension     Moderate episode of recurrent major depressive disorder 1/17/2018    Nephropathy hypertensive, 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     Osteopenia of multiple sites 3/9/2018    Based upon bone density measurements. Patient also has chronic kidney disease.    Stroke 11/2016       Past Surgical History:   Procedure Laterality Date    ANGIOGRAM, CORONARY, WITH LEFT HEART CATHETERIZATION N/A 2/7/2022    Procedure: Angiogram, Coronary, with Left Heart Cath;  Surgeon: Gino Leal MD;  Location: [facility] CATH/EP LAB;  Service: Cardiology;  Laterality: N/A;    CARDIAC SURGERY      stents    EYE SURGERY      WRIST SURGERY         Past Social History:  Social History     Socioeconomic History    Marital status:      Spouse name: Aria Isaac    Number of children: 3   Occupational History    Occupation: Not working   Tobacco Use    Smoking status: Never    Smokeless tobacco: Never   Substance and Sexual Activity    Alcohol use: No    Drug use: No    Sexual activity: Not Currently     Social Determinants of Health     Financial Resource Strain: Medium Risk (7/11/2024)    Overall Financial Resource Strain (CARDIA)     Difficulty of Paying Living Expenses: Somewhat hard   Food Insecurity: No Food Insecurity (7/11/2024)    Hunger Vital Sign     Worried About Running Out of Food in the Last Year: Never true     Ran Out of Food in the Last Year: Never true   Transportation Needs: No Transportation Needs (7/11/2024)    TRANSPORTATION NEEDS     Transportation : No   Physical Activity: Inactive (6/15/2022)    Exercise Vital Sign     Days of Exercise per Week: 0 days     Minutes of Exercise per Session: 0 min   Stress: Stress Concern Present (7/11/2024)    [employer] - Occupational Stress Questionnaire     Feeling of Stress : To some extent   Housing Stability: Patient Declined (6/30/2024)    Housing Stability Vital Sign     Unable to Pay for Housing in the Last Year: Patient declined     Homeless in the Last Year: Patient declined       Medications:  No current facility-administered medications on file prior to encounter.      Current Outpatient Medications on File Prior to Encounter   Medication Sig Dispense Refill    amiodarone (PACERONE) 100 MG Tab Take 1 tablet (100 mg total) by mouth once daily. 30 tablet 0    atorvastatin (LIPITOR) 40 MG tablet Take 40 mg by mouth once daily.      b complex vitamins tablet Take 1 tablet by mouth once daily.      doxycycline (VIBRAMYCIN) 100 MG Cap Take 1 capsule (100 mg total) by mouth 2 (two) times daily. for 10 days 20 capsule 0    ELIQUIS 2.5 mg Tab Take 2.5 mg by mouth 2 (two) times daily.      latanoprost 0.005 % ophthalmic solution Place 1 drop into both eyes every evening.      metoclopramide HCl (REGLAN) 5 MG tablet Take 1 tablet (5 mg total) by mouth 3 (three) times daily before meals. 30 tablet 1    midodrine (PROAMATINE) 10 MG tablet Take 1 tablet (10 mg total) by mouth 3 (three) times daily with meals. 270 tablet 0    ondansetron (ZOFRAN-ODT) 4 MG TbDL Take 1 tablet (4 mg total) by mouth every 6 (six) hours as needed (nausea). 30 tablet 5    sodium zirconium cyclosilicate (LOKELMA) 10 gram packet Take 1 packet (10 g total) by mouth every Tuesday, Thursday, Saturday, Sunday. Mix entire contents of packet(s) into drinking glass containing 3 tablespoons of water; stir well and drink immediately. Add water and repeat until no powder remains to receive entire dose. (Patient not taking: Reported on 8/12/2024) 16 packet 0    VELTASSA 8.4 gram PwPk Take 8.4 g by mouth once daily. (Patient not taking: Reported on 8/12/2024)       Scheduled Meds:   amiodarone  100 mg Oral Daily    apixaban  2.5 mg Oral BID    atorvastatin  40 mg Oral Daily    doxycycline  100 mg Oral BID    latanoprost  1 drop Both Eyes QHS    sodium zirconium cyclosilicate  10 g Oral Every Tues, Thurs, Sat, Sun     Continuous Infusions:  PRN Meds:.  Current Facility-Administered Medications:     acetaminophen, 650 mg, Oral, Q8H PRN    acetaminophen, 650 mg, Oral, Q4H PRN    aluminum-magnesium hydroxide-simethicone, 30 mL,  "Oral, QID PRN    dextrose 50%, 12.5 g, Intravenous, PRN    dextrose 50%, 25 g, Intravenous, PRN    glucagon (human recombinant), 1 mg, Intramuscular, PRN    glucose, 16 g, Oral, PRN    glucose, 24 g, Oral, PRN    hydrALAZINE, 10 mg, Intravenous, Q6H PRN    melatonin, 6 mg, Oral, Nightly PRN    naloxone, 0.02 mg, Intravenous, PRN    ondansetron, 4 mg, Intravenous, Q6H PRN    Allergies:  Cyclobenzaprine, Fish containing products, Peanut, and Tramadol    Past Family History:  Reviewed; refer to Hospitalist Admission Note    Review of Systems:  Review of Systems - All 14 systems reviewed and negative, except as noted in HPI    Physical Exam:    BP (!) 149/70 (BP Location: Left arm, Patient Position: Lying)   Pulse 69   Temp 98 °F (36.7 °C) (Oral)   Resp 20   Ht 5' 5" (1.651 m)   Wt 47.6 kg (104 lb 15 oz)   SpO2 96%   BMI 17.46 kg/m²     General Appearance:    Alert, cooperative, no distress, appears stated age   Head:    Normocephalic, without obvious abnormality, atraumatic   Eyes:    PER, conjunctiva/corneas clear, EOM's intact in both eyes        Throat:   Lips, mucosa, and tongue normal; teeth and gums normal   Back:     Symmetric, no curvature, ROM normal, no CVA tenderness   Lungs:     Clear to auscultation bilaterally, respirations unlabored   Chest wall:    No tenderness or deformity   Heart:    Regular rate and rhythm, S1 and S2 normal, no murmur, rub   or gallop   Abdomen:     Soft, non-tender, bowel sounds active all four quadrants,     no masses, no organomegaly   Extremities:   Extremities normal, atraumatic, no cyanosis or edema   Pulses:   2+ and symmetric all extremities   MSK:   No joint or muscle swelling, tenderness or deformity   Skin:   Skin color, texture, turgor normal, no rashes or lesions   Neurologic:   CNII-XII intact, normal strength and sensation       Throughout.  No flap     Results:  Lab Results   Component Value Date     08/13/2024    K 5.0 08/13/2024    CL 97 08/13/2024    " CO2 31 (H) 08/13/2024    BUN 30 (H) 08/13/2024    CREATININE 6.7 (H) 08/13/2024    CALCIUM 9.0 08/13/2024    ANIONGAP 12 08/13/2024    ESTGFRAFRICA 4.7 (A) 02/08/2022    EGFRNONAA 4.0 (A) 02/08/2022       Lab Results   Component Value Date    CALCIUM 9.0 08/13/2024    PHOS 3.0 08/13/2024       Recent Labs   Lab 08/13/24  0404   WBC 4.51   RBC 4.11   HGB 10.6*   HCT 36.7*      MCV 89   MCH 25.8*   MCHC 28.9*        Imaging Results              CT Head Without Contrast (Final result)  Result time 08/12/24 17:32:48      Final result by Vince Kimball MD (08/12/24 17:32:48)                   Impression:      1. No acute intracranial abnormality appreciated.  No interval detrimental change in the imaging appearance of the brain when compared with the prior MRI of the brain performed 11/11/2022.  2. Minimal right maxillary sinus disease.  3. Age-appropriate supratentorial cerebral volume loss and chronic microvascular ischemic changes within the periventricular white matter of the supratentorial brain.  4. Small foci of remote lacunar infarction within the lateral aspect of the left caudate head and left basal ganglia, unchanged when compared to the prior study..      Electronically signed by: Eyad Kimball MD  Date:    08/12/2024  Time:    17:32               Narrative:    EXAMINATION:  CT HEAD WITHOUT CONTRAST    CLINICAL HISTORY:  Mental status change, unknown cause;    TECHNIQUE:  5 mm low dose non-contrast contiguous axial images were acquired through the head.  Subsequently, 2 dimensional coronal and sagittal reconstructed images were generated from the source data.    COMPARISON:  MRI brain-11/11/2022    FINDINGS:  The brain is normally formed with preserved gray-white matter junction differentiation. No evidence of acute/recent major vascular territory cerebral infarction, parenchymal hemorrhage, or intra-axial mass.  There are confluent areas of periventricular white matter hypoattenuation compatible  with age-appropriate chronic microvascular ischemic changes.  There is age-appropriate cerebral volume loss with widening of the CSF spaces over both cerebral convexities.  There is a small focus of remote lacunar infarction present within the lateral aspect of the left caudate head, similar to the prior examination.  There are small foci of remote lacunar infarction within the left basal ganglia, unchanged.    No hydrocephalus.  No effacement of the skull-base cisterns.  No extra-axial fluid collections or blood products.    There is minimal mucoperiosteal thickening noted within the dependent right maxillary sinus.  The visualized orbits are unremarkable.  The bony calvarium and visualized facial bones show no acute abnormality.                                       X-Ray Chest AP Portable (Final result)  Result time 08/12/24 12:14:44      Final result by Rahul Palacio MD (08/12/24 12:14:44)                   Impression:      Unchanged radiograph of the chest when accounting for differences in imaging technique.      Electronically signed by: Rahul Palacio  Date:    08/12/2024  Time:    12:14               Narrative:    CLINICAL HISTORY:  ([id number])83 y/o  (1940) F    sob;    TECHNIQUE:  (A#[id number], exam time 8/12/2024 12:11)    XR CHEST AP PORTABLE [id number]    COMPARISON:  Radiograph from 08/09/2024.    FINDINGS:  There are mild faint linear opacities at the lung bases suggestive of atelectasis/scarring  aspiration/pneumonia or trace edema in the appropriate clinical setting.  No pneumothorax is identified. The heart is mildly enlarged. Atheromatous calcifications are seen at the aortic arch. Osseous structures show degenerative changes in the spine. The visualized upper abdomen is unremarkable.                                    Patient care was time spent personally by me on the following activities:   Obtaining a history  Examination of patient.  Providing medical care at the patients  bedside.  Developing a treatment plan with patient or surrogate and bedside caregivers  Ordering and reviewing laboratory studies, radiographic studies, pulse oximetry.  Ordering and performing treatments and interventions.  Evaluation of patient's response to treatment.  Discussions with consultants while on the unit and immediately available to the patient.  Re-evaluation of the patient's condition.  Documentation in the medical record.       I have personally reviewed pertinent radiological imaging and reports.    Jimenez Valero MD  [facility] [place]  [phone]

## 2024-08-13 NOTE — PROGRESS NOTES
[facility] Medicine  Progress Note    Patient Name: Tyar Isaac  MRN: [id number]  Patient Class: OP- Observation   Admission Date: 8/12/2024  Length of Stay: 0 days  Attending Physician: German Michael DO  Primary Care Provider: Kalpesh Goel MD        Subjective:     Principal Problem:Acute metabolic encephalopathy        HPI:  The patient is a 84 year old female with a PMHx of ESRD on HD ([facility] - Dr. Mott), Atrial fibrillation (on Eliquis), HTN, CAD, COPD, previous CVA who presnted to the emergency department for the evaluation of generalized weakness x 3 weeks. Patient was recently evaluated in the emergency department here at [facility] 8/9/24 for the evaluation of shortness of breath with cough and generalized weakness. Chest xray at that time revealed right basilar ground-glass opacities, suspicious for viral/atypical pneumonia vs pulmonary edema. She was discharged home with doxycycline. Prior to that she was admitted here and discharged on 7/19/24 after a admission for bradycardia for which her amiodarone and metoprolol were discontinued per cardiology and she was placed on cardizem. However, upon follow up she was taken off her cardizem and placed back on amiodarone at 100 mg daily by her cardiologist Dr. Thompson. Patient went to HD this am with 1.5L off and patient reported generalized weakness following treatment. EMS was activated and spoke to patient's son who reports that she was been experiencing weakness and confusion over the past three weeks. She has also been having difficulty controlling her blood pressure and has had some medication changes as of recent.     Patient evaluated in the emergency department - COVID and flu negative. Electrolytes stable. BNP 3400, high sensitivity troponin 226, EKG with normal sinus rhythm, 71 beats per minute with incomplete right bundle-branch block. Septal infarct age undetermined with T-wave abnormality in the inferior and anterior  leads. Patient admitted to hospital medicine service for further evaluation.           Overview/Hospital Course:  Patient and admitted to hospital medicine service on telemetry and evaluated. She continued to have tremors and facial jerking. No further generalized weakness. Patient has remained hemodynamically stable. CBC with mild anemia secondary for ESRD, CMP with stable electrolytes and consistent with ESRD. Tropnin trended down from 226 on admit to 170. CT head without contrast with no interval changes from previous MRI in 11/2022, chronic age-related changes, small foci of lacunar infarction that is stable from previous imaging. with Per further chart review, patient was prescribed Reglan for possible gastroparesis in July 2024. Will discontinue Reglan at this time as it may be contributing to her tremors as well as facial movements and confusion. Maintained on HD schedule per nephrology. PT/OT consulted.     Interval History:  Patient evaluated at bedside. No complaints. No family at bedside. NADN.     Review of Systems   Constitutional:  Negative for activity change, appetite change, fatigue and fever.   Respiratory:  Negative for chest tightness.    Cardiovascular:  Negative for chest pain.   Gastrointestinal:  Negative for abdominal distention and abdominal pain.   Neurological:  Positive for tremors. Negative for dizziness, weakness and numbness.     Objective:     Vital Signs (Most Recent):  Temp: 98 °F (36.7 °C) (08/13/24 0751)  Pulse: 69 (08/13/24 0800)  Resp: 20 (08/13/24 0751)  BP: (!) 149/70 (08/13/24 0900)  SpO2: 96 % (08/13/24 0751) Vital Signs (24h Range):  Temp:  [97.7 °F (36.5 °C)-99 °F (37.2 °C)] 98 °F (36.7 °C)  Pulse:  [62-79] 69  Resp:  [18-20] 20  SpO2:  [93 %-98 %] 96 %  BP: (149-191)/(60-84) 149/70     Weight: 47.6 kg (104 lb 15 oz)  Body mass index is 17.46 kg/m².  No intake or output data in the 24 hours ending 08/13/24 1155      Physical Exam  Constitutional:       General: She is not  in acute distress.  HENT:      Head: Normocephalic.      Mouth/Throat:      Mouth: Mucous membranes are moist.      Pharynx: Oropharynx is clear.   Eyes:      Extraocular Movements: Extraocular movements intact.      Pupils: Pupils are equal, round, and reactive to light.   Cardiovascular:      Rate and Rhythm: Normal rate and regular rhythm.      Pulses: Normal pulses.      Heart sounds: Normal heart sounds.   Pulmonary:      Effort: Pulmonary effort is normal.      Breath sounds: Normal breath sounds.   Abdominal:      General: Abdomen is flat. Bowel sounds are normal. There is no distension.      Palpations: Abdomen is soft.      Tenderness: There is no abdominal tenderness. There is no guarding.   Musculoskeletal:         General: Normal range of motion.   Skin:     General: Skin is warm and dry.      Capillary Refill: Capillary refill takes less than 2 seconds.   Neurological:      General: No focal deficit present.      Mental Status: She is alert.      Comments: Tremors and facial twitching noted   Psychiatric:         Mood and Affect: Mood normal.             Significant Labs: All pertinent labs within the past 24 hours have been reviewed.  CBC:   Recent Labs   Lab 08/12/24  1206 08/13/24  0404   WBC 5.31 4.51   HGB 10.9* 10.6*   HCT 36.9* 36.7*    242     CMP:   Recent Labs   Lab 08/12/24  1206 08/13/24  0404    140   K 3.8 5.0   CL 97 97   CO2 33* 31*   GLU 79 60*   BUN 19 30*   CREATININE 5.0* 6.7*   CALCIUM 8.8 9.0   PROT 7.3 6.7   ALBUMIN 3.8 3.6   BILITOT 0.7 0.7   ALKPHOS 62 59   AST 25 24   ALT 15 16   ANIONGAP 8 12     Magnesium:   Recent Labs   Lab 08/12/24  1206 08/13/24  0404   MG 1.8 1.9     Troponin:   Recent Labs   Lab 08/12/24  1206 08/12/24  1640   TROPONINIHS 226.3* 177.0*       Significant Imaging: I have reviewed all pertinent imaging results/findings within the past 24 hours.  CT: I have reviewed all pertinent results/findings within the past 24 hours and my personal  findings are:  stable findings from previous MRI 11/11/22. Stable L sided infarct when compared to previous exams. Age related changes.   CXR: I have reviewed all pertinent results/findings within the past 24 hours and my personal findings are:  pulm edema vs viral pna   Echo: I have reviewed all pertinent results/findings within the past 24 hours and my personal findings are:     EKG: I have reviewed all pertinent results/findings within the past 24 hours and my personal findings are: nsr with nonspecific ischemic changes and rbbb     Assessment/Plan:      * Acute metabolic encephalopathy  Patient presents with generalized confusion with three week history of confusion  Recently treated for possible viral pneumonia continue doxy   Recurrent episodes of confusion, frequent hospitalization, chronic renal disease and debility - will consult palliative care   UA without bacteria  CT brain without contrast without acute findings - previous CVA noted stable from prior exam, age related changes  DC Reglan as this could be affecting patient's cognition and causing tremors   PT/OT ordered for evaluation   Supportive care     Demand ischemia  Initial troponin 226  EKG normal sinus rhythm, 71 beats per minute with incomplete right bundle-branch block. Septal infarct age undetermined with T-wave abnormality in the inferior and anterior leads.   Patient with history of ESRD - last troponin from 8/9/24 34   Last echo from 6/28/24 with EF 64%, mild AS, mod MR, no WMA   Nuclear stress 6/28/24 without reversible ischemia   Cardiology consulted   Troponin trending down from 226 to 177   Continue telemetry monitoring     ESRD (end stage renal disease)  Patient ESRD HD MWF  Last HD Monday 8/12/24 with 1.5L off  Lytes stable  Daily BMP  Renal diet  Renal dose medication  Consult nephrology Dr. Mott     Atrial fibrillation  Patient currently in NSR  Continue amiodarone 100 mg daily  Continue Eliquis 2.5 mg bid   Continue telemetry  monitoring   Cardiology consulted     Benign hypertension with CKD (chronic kidney disease) stage V  Chronic, controlled.  Latest blood pressure and vitals reviewed-     Temp:  [98.5 °F (36.9 °C)]   Pulse:  [68-75]   Resp:  [18]   BP: (160-190)/(70-81)   SpO2:  [95 %-98 %] .   Home meds for hypertension were reviewed and noted below-    While in the hospital, will manage blood pressure as follows; Continue home antihypertensive regimen    Will utilize p.r.n. blood pressure medication only if patient's blood pressure greater than 180/110 and she develops symptoms such as worsening chest pain or shortness of breath.        VTE Risk Mitigation (From admission, onward)           Ordered     apixaban tablet 2.5 mg  2 times daily         08/12/24 1613     IP VTE HIGH RISK PATIENT  Once         08/12/24 1536     Place sequential compression device  Until discontinued         08/12/24 1536                    Discharge Planning   ILAN: 8/14/2024     Code Status: Full Code   Is the patient medically ready for discharge?:     Reason for patient still in hospital (select all that apply): Patient trending condition, Consult recommendations, and PT / OT recommendations                     KELBY Montoya  Department of Hospital Medicine   [facility]

## 2024-08-13 NOTE — SUBJECTIVE & OBJECTIVE
Interval History:  Patient evaluated at bedside. No complaints. No family at bedside. NADN.     Review of Systems   Constitutional:  Negative for activity change, appetite change, fatigue and fever.   Respiratory:  Negative for chest tightness.    Cardiovascular:  Negative for chest pain.   Gastrointestinal:  Negative for abdominal distention and abdominal pain.   Neurological:  Positive for tremors. Negative for dizziness, weakness and numbness.     Objective:     Vital Signs (Most Recent):  Temp: 98 °F (36.7 °C) (08/13/24 0751)  Pulse: 69 (08/13/24 0800)  Resp: 20 (08/13/24 0751)  BP: (!) 149/70 (08/13/24 0900)  SpO2: 96 % (08/13/24 0751) Vital Signs (24h Range):  Temp:  [97.7 °F (36.5 °C)-99 °F (37.2 °C)] 98 °F (36.7 °C)  Pulse:  [62-79] 69  Resp:  [18-20] 20  SpO2:  [93 %-98 %] 96 %  BP: (149-191)/(60-84) 149/70     Weight: 47.6 kg (104 lb 15 oz)  Body mass index is 17.46 kg/m².  No intake or output data in the 24 hours ending 08/13/24 1155      Physical Exam  Constitutional:       General: She is not in acute distress.  HENT:      Head: Normocephalic.      Mouth/Throat:      Mouth: Mucous membranes are moist.      Pharynx: Oropharynx is clear.   Eyes:      Extraocular Movements: Extraocular movements intact.      Pupils: Pupils are equal, round, and reactive to light.   Cardiovascular:      Rate and Rhythm: Normal rate and regular rhythm.      Pulses: Normal pulses.      Heart sounds: Normal heart sounds.   Pulmonary:      Effort: Pulmonary effort is normal.      Breath sounds: Normal breath sounds.   Abdominal:      General: Abdomen is flat. Bowel sounds are normal. There is no distension.      Palpations: Abdomen is soft.      Tenderness: There is no abdominal tenderness. There is no guarding.   Musculoskeletal:         General: Normal range of motion.   Skin:     General: Skin is warm and dry.      Capillary Refill: Capillary refill takes less than 2 seconds.   Neurological:      General: No focal deficit  present.      Mental Status: She is alert.      Comments: Tremors and facial twitching noted   Psychiatric:         Mood and Affect: Mood normal.             Significant Labs: All pertinent labs within the past 24 hours have been reviewed.  CBC:   Recent Labs   Lab 08/12/24  1206 08/13/24  0404   WBC 5.31 4.51   HGB 10.9* 10.6*   HCT 36.9* 36.7*    242     CMP:   Recent Labs   Lab 08/12/24  1206 08/13/24  0404    140   K 3.8 5.0   CL 97 97   CO2 33* 31*   GLU 79 60*   BUN 19 30*   CREATININE 5.0* 6.7*   CALCIUM 8.8 9.0   PROT 7.3 6.7   ALBUMIN 3.8 3.6   BILITOT 0.7 0.7   ALKPHOS 62 59   AST 25 24   ALT 15 16   ANIONGAP 8 12     Magnesium:   Recent Labs   Lab 08/12/24  1206 08/13/24  0404   MG 1.8 1.9     Troponin:   Recent Labs   Lab 08/12/24  1206 08/12/24  1640   TROPONINIHS 226.3* 177.0*       Significant Imaging: I have reviewed all pertinent imaging results/findings within the past 24 hours.  CT: I have reviewed all pertinent results/findings within the past 24 hours and my personal findings are:  stable findings from previous MRI 11/11/22. Stable L sided infarct when compared to previous exams. Age related changes.   CXR: I have reviewed all pertinent results/findings within the past 24 hours and my personal findings are:  pulm edema vs viral pna   Echo: I have reviewed all pertinent results/findings within the past 24 hours and my personal findings are:     EKG: I have reviewed all pertinent results/findings within the past 24 hours and my personal findings are: nsr with nonspecific ischemic changes and rbbb

## 2024-08-13 NOTE — HOSPITAL COURSE
84-year-old female admitted with confusion, weakness, tremors.  Patient has remained hemodynamically stable.  CBC with mild anemia secondary for ESRD, CMP with stable electrolytes and consistent with ESRD.  Tropnin trended down from 226 on admit to 170 likely 2/2 demand ischemia from fluid overload/ESRD.  CT head without contrast with no interval changes from previous MRI in 11/2022, chronic age-related changes, small foci of lacunar infarction that is stable from previous imaging.  Per further chart review, patient was prescribed Reglan for possible gastroparesis in July 2024.  Reglan was discontinued as it may be contributing to her tremors as well as facial movements and confusion.  Maintained on HD schedule per nephrology.  PT/OT consulted.  Palliative care team consulted to discuss possible comfort/hospice care.  Family opted to proceed with full code and care as of now.  Patient continued dialysis while inpatient, only 850 mL able to be removed due to hypotension during most recent session.  Patient remained hypotensive after dialysis, but BP stabilized overnight.  She was seen and examined on date of discharge and was appropriate for discharge to home health.  Pt's son agreed with discharge plan.

## 2024-08-13 NOTE — PLAN OF CARE
[facility]  Initial Discharge Assessment       Primary Care Provider: Kalpesh Goel MD    Admission Diagnosis: Acute metabolic encephalopathy [G93.41]  Fatigue, unspecified type [R53.83]    Admission Date: 8/12/2024  Expected Discharge Date: 8/14/2024     completed discharge assessment with pt's son over the phone. Pt AAOx4s. Pt lives at home with her adult son and daughter in law. Demographics, PCP, and insurance verified. Home health with [facility]. No dialysis. Pt completes ADLs with assistance of DME listed below. Pt to discharge home via family transport. Pt has no other needs to be addressed at this time.     Transition of Care Barriers: None    Payor: [email or web address] MANAGED MEDICARE / Plan: [employer] MEDICARE HMO / Product Type: Capitation /     Extended Emergency Contact Information  Primary Emergency Contact: Raffi Isaac  Address: [address] [place] [address] [facility]  Home Phone: [phone]  Mobile Phone: [phone]  Relation: Son   needed? No  Secondary Emergency Contact: Marcelo Isaac  Mobile Phone: [phone]  Relation: Son    Discharge Plan A: Home Health  Discharge Plan B: Home Health      [facility] [place], [place] - [address]  [address] [address]  Phone: [phone] Fax: [phone]    [facility] - [place], [place] - [address]  [address]  [place] [address]  Phone: [phone] Fax: [phone]      Initial Assessment (most recent)       Adult Discharge Assessment - 08/13/24 1526          Discharge Assessment    Assessment Type Discharge Planning Assessment     Confirmed/corrected address, phone number and insurance Yes     Confirmed Demographics Correct on Facesheet     Source of Information family     When was your last doctors appointment? --   08/03/2024    Reason For Admission Acute metabolic encephalopathy     People in Home significant other     Do you expect  to return to your current living situation? Yes     Do you have help at home or someone to help you manage your care at home? Yes     Who are your caregiver(s) and their phone number(s)? Raffi Isaac (son) [phone]     Prior to hospitilization cognitive status: Unable to Assess     Current cognitive status: Unable to Assess     Walking or Climbing Stairs Difficulty yes     Walking or Climbing Stairs ambulation difficulty, requires equipment     Dressing/Bathing Difficulty yes     Dressing/Bathing bathing difficulty, requires equipment     Equipment Currently Used at Home rollator;wheelchair   Portable toilet    Readmission within 30 days? Yes     Do you currently have service(s) that help you manage your care at home? Yes     Name and Contact number of agency [facility]     Is the pt/caregiver preference to resume services with current agency Yes     Do you take prescription medications? Yes     Do you have prescription coverage? Yes     Coverage [employer] MANAGED MEDICARE - [employer] MEDICARE HMO - CAPITATED     Do you have any problems affording any of your prescribed medications? No     Is the patient taking medications as prescribed? yes     Who is going to help you get home at discharge? Raffi Isaac (son) [phone]     How do you get to doctors appointments? family or friend will provide     Are you on dialysis? Yes     Dialysis Name and Scheduled days [facility] MWF @ 6:00AM     Do you take coumadin? No   Elequis    Discharge Plan A Home Health     Discharge Plan B Home Health     DME Needed Upon Discharge  none     Discharge Plan discussed with: Adult children     Transition of Care Barriers None        Physical Activity    On average, how many days per week do you engage in moderate to strenuous exercise (like a brisk walk)? 0 days     On average, how many minutes do you engage in exercise at this level? 0 min        Financial Resource Strain    How hard is it for you to pay for the very basics  like food, housing, medical care, and heating? Not hard at all        Housing Stability    In the last 12 months, was there a time when you were not able to pay the mortgage or rent on time? No     At any time in the past 12 months, were you homeless or living in a shelter (including now)? No        Transportation Needs    Has the lack of transportation kept you from medical appointments, meetings, work or from getting things needed for daily living? No        Food Insecurity    Within the past 12 months, you worried that your food would run out before you got the money to buy more. Never true     Within the past 12 months, the food you bought just didn't last and you didn't have money to get more. Never true        Stress    Do you feel stress - tense, restless, nervous, or anxious, or unable to sleep at night because your mind is troubled all the time - these days? Patient unable to answer        Social Isolation    How often do you feel lonely or isolated from those around you?  Patient unable to answer        Alcohol Use    Q1: How often do you have a drink containing alcohol? Patient unable to answer     Q2: How many drinks containing alcohol do you have on a typical day when you are drinking? Patient unable to answer     Q3: How often do you have six or more drinks on one occasion? Patient unable to answer        Utilities    In the past 12 months has the electric, gas, oil, or water company threatened to shut off services in your home? Patient unable to answer        Health Literacy    How often do you need to have someone help you when you read instructions, pamphlets, or other written material from your doctor or pharmacy? Patient unable to respond

## 2024-08-13 NOTE — PLAN OF CARE
Problem: Physical Therapy  Goal: Physical Therapy Goal  Description: Goals to be met by: 24     Patient will increase functional independence with mobility by performin. Supine to sit with Stand-by Assistance  2. Sit to supine with MInimal Assistance  3. Sit to stand transfer with Moderate Assistance  4. Bed to chair transfer with Moderate Assistance using Rolling Walker  5. Gait  x 40 feet with Moderate Assistance using Rolling Walker.     Outcome: Progressing

## 2024-08-13 NOTE — SUBJECTIVE & OBJECTIVE
Interval History: Pt seen today for palliative medicine consult to discuss goals of care.     Past Medical History:   Diagnosis Date    A-fib     Anxiety     Depression     Disorder of kidney and ureter     Encephalopathy acute 1/1/2018    End stage kidney disease 6/17/2017    Gout     Hyperlipidemia     Hypertension     Moderate episode of recurrent major depressive disorder 1/17/2018    Nephropathy hypertensive, 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    Osteopenia of multiple sites 3/9/2018    Based upon bone density measurements. Patient also has chronic kidney disease.    Stroke 11/2016       Past Surgical History:   Procedure Laterality Date    ANGIOGRAM, CORONARY, WITH LEFT HEART CATHETERIZATION N/A 2/7/2022    Procedure: Angiogram, Coronary, with Left Heart Cath;  Surgeon: Gino Leal MD;  [facility] CATH[facility]EP LAB;  Service: Cardiology;  Laterality: N/A;    CARDIAC SURGERY      stents    EYE SURGERY      WRIST SURGERY         Review of patient's allergies indicates:   Allergen Reactions    Cyclobenzaprine     Fish containing products Hives    Peanut Other (See Comments)    Tramadol Itching       Medications:  Continuous Infusions:  Scheduled Meds:   amiodarone  100 mg Oral Daily    apixaban  2.5 mg Oral BID    atorvastatin  40 mg Oral Daily    doxycycline  100 mg Oral BID    [START ON 8/14/2024] epoetin rosy-epbx  50 Units/kg Subcutaneous Every Mon, Wed, Fri    latanoprost  1 drop Both Eyes QHS    sodium zirconium cyclosilicate  10 g Oral Every Tues, Thurs, Sat, Sun     PRN Meds:  Current Facility-Administered Medications:     acetaminophen, 650 mg, Oral, Q8H PRN    acetaminophen, 650 mg, Oral, Q4H PRN    aluminum-magnesium hydroxide-simethicone, 30 mL, Oral, QID PRN    dextrose 50%, 12.5 g, Intravenous, PRN    dextrose 50%, 25 g, Intravenous, PRN    glucagon (human  recombinant), 1 mg, Intramuscular, PRN    glucose, 16 g, Oral, PRN    glucose, 24 g, Oral, PRN    hydrALAZINE, 10 mg, Intravenous, Q6H PRN    melatonin, 6 mg, Oral, Nightly PRN    naloxone, 0.02 mg, Intravenous, PRN    ondansetron, 4 mg, Intravenous, Q6H PRN    Family History       Problem Relation (Age of Onset)    Cancer Father    Heart disease Mother          Tobacco Use    Smoking status: Never    Smokeless tobacco: Never   Substance and Sexual Activity    Alcohol use: No    Drug use: No    Sexual activity: Not Currently       Review of Systems   Unable to perform ROS: Mental status change   Objective:     Vital Signs (Most Recent):  Temp: 98.7 °F (37.1 °C) (08/13/24 1534)  Pulse: 68 (08/13/24 1534)  Resp: 20 (08/13/24 1534)  BP: (!) 183/67 (08/13/24 1534)  SpO2: 96 % (08/13/24 1534) Vital Signs (24h Range):  Temp:  [97.7 °F (36.5 °C)-99 °F (37.2 °C)] 98.7 °F (37.1 °C)  Pulse:  [67-79] 68  Resp:  [18-20] 20  SpO2:  [93 %-98 %] 96 %  BP: (149-190)/(60-84) 183/67     Weight: 47.6 kg (104 lb 15 oz)  Body mass index is 17.46 kg/m².       Physical Exam  Vitals and nursing note reviewed.   Constitutional:       General: She is not in acute distress.     Appearance: She is ill-appearing.   Eyes:      General: No scleral icterus.     Pupils: Pupils are equal, round, and reactive to light.   Cardiovascular:      Rate and Rhythm: Normal rate and regular rhythm.      Pulses: Normal pulses.   Pulmonary:      Effort: Pulmonary effort is normal. No respiratory distress.   Skin:     General: Skin is warm and dry.   Neurological:      Mental Status: She is alert. She is disoriented.      Motor: Weakness present.        Review of Symptoms      Symptom Assessment (ESAS 0-10 Scale)  Unable to complete assessment due to Mental status change         Pain Assessment in Advanced Demential Scale (PAINAD)   Breathing - Independent of vocalization:  0  Negative vocalization:  0  Facial expression:  0  Body language:  0  Consolability:   0  Total:  0    Performance Status:  40    Living Arrangements:  Lives with family    Psychosocial/Cultural:   See Palliative Psychosocial Note: No  **Primary  to Follow**  Palliative Care  Consult: No    Advance Care Planning   Advance Directives:   Do Not Resuscitate Status: No      Decision Making:  Family answered questions  Goals of Care: The family endorses that what is most important right now is to focus on symptom/pain control, quality of life, even if it means sacrificing a little time, and improvement in condition but with limits to invasive therapies    Accordingly, we have decided that the best plan to meet the patient's goals includes continuing with treatment         Significant Labs: All pertinent labs within the past 24 hours have been reviewed.  CBC:   Recent Labs   Lab 08/13/24 0404   WBC 4.51   HGB 10.6*   HCT 36.7*   MCV 89        BMP:  Recent Labs   Lab 08/13/24 0404   GLU 60*      K 5.0   CL 97   CO2 31*   BUN 30*   CREATININE 6.7*   CALCIUM 9.0   MG 1.9     LFT:  Lab Results   Component Value Date    AST 24 08/13/2024    ALKPHOS 59 08/13/2024    BILITOT 0.7 08/13/2024     Albumin:   Albumin   Date Value Ref Range Status   08/13/2024 3.6 3.5 - 5.2 g/dL Final     Protein:   Total Protein   Date Value Ref Range Status   08/13/2024 6.7 6.0 - 8.4 g/dL Final     Lactic acid:   Lab Results   Component Value Date    LACTATE 0.7 01/04/2024    LACTATE 2.2 (HH) 01/04/2024       Significant Imaging: I have reviewed all pertinent imaging results/findings within the past 24 hours.

## 2024-08-13 NOTE — ASSESSMENT & PLAN NOTE
Initial troponin 226  EKG normal sinus rhythm, 71 beats per minute with incomplete right bundle-branch block. Septal infarct age undetermined with T-wave abnormality in the inferior and anterior leads.   Patient with history of ESRD - last troponin from 8/9/24 34   Last echo from 6/28/24 with EF 64%, mild AS, mod MR, no WMA   Nuclear stress 6/28/24 without reversible ischemia   Cardiology consulted   Troponin trending down from 226 to 177   Continue telemetry monitoring

## 2024-08-13 NOTE — PT/OT/SLP EVAL
Creation Time: 8/13/2024  2:53 PM       Physical Therapy Evaluation     Patient Name:  Tyra Isaac   MRN:  [id number]     Recommendations:      Discharge Recommendations: Low Intensity Therapy   Discharge Equipment Recommendations: wheelchair has a mobility limitation that significantly impairs her ability to participate in one or more mobility related activities in the community. The mobility limitation cannot be sufficiently resolved by the use of a cane or walker. The use of a transport wheelchair will significantly improve the patients ability to participate in the community and the patient will use it on regular basis outside the home. Tyra has expressed her willingness to use a transport wheelchair outside the home. she also has a caregiver who is available, willing, and able to provide assistance with the wheelchair when needed.   Barriers to discharge: None     Assessment:      Tyra Isaac is a 84 y.o. female admitted with a medical diagnosis of Bradycardia.  She presents with the following impairments/functional limitations: weakness, impaired endurance, impaired self care skills, impaired functional mobility, gait instability, impaired balance, decreased coordination, decreased upper extremity function, decreased lower extremity function, abnormal tone, impaired cardiopulmonary response to activity Pt found awake in bed. Agreeable to PT. Reports spitting up  with coughing and requests change of linen.Pt reports declining mobility over the last few months. Son is placing her in the care for HD. Today she is extremely tremulous with supine<> sit. Once seated at EOB and with deep pressure to Thighs, pt quiets down. Attempted sit<> stand x 2 trials with RW max A x 2. Again, pt extremely tremulous and has zero postural stability at this time,  unable to quiet. It is unclear how long she has been experiencing this. In July when PT was following her, she was tremulous but anita to ambulate with  mod A x 40 ft. We will continue to follow her and assess ability to progress with functional mobiility.     Rehab Prognosis: Fair and Poor; patient would benefit from acute skilled PT services to address these deficits and reach maximum level of function.    Recent Surgery: * No surgery found *       Plan:      During this hospitalization, patient to be seen 5 x/week to address the identified rehab impairments via gait training, therapeutic activities, therapeutic exercises and progress toward the following goals:     Plan of Care Expires:  09/13/24     Subjective      Chief Complaint: tremors, difficulty with feeding and mobility  Patient/Family Comments/goals: some independence  Pain/Comfort:  Pain Rating 1: 0/10  Pain Rating Post-Intervention 1: 0/10     Patients cultural, spiritual, Yarsanism conflicts given the current situation: no     Living Environment:Patient's son and DIL live with her is a [facility] with 2-3 KEY   Prior to admission, patients level of function was mod A transfers. She reports 2 falls in the past year.  Equipment used at home: rollator, wheelchair, bedside commode.  DME owned (not currently used): none.  Upon discharge, patient will have assistance from family.  .  Objective:      Communicated with nurseGayathri, prior to session.  Patient found HOB elevated with bed alarm, telemetry, peripheral IV  upon PT entry to room.     General Precautions: Standard, fall  Orthopedic Precautions:N/A   Braces: N/A  Respiratory Status: Room air     Exams:  Unable to MMT secondary to tremulous body  Cognitive Exam:  Patient is oriented to Person, Place, and Situation  Gross Motor Coordination:  poor     Functional Mobility:  Bed Mobility:     Supine to Sit: moderate assistance  Sit to Supine: moderate assistance  Transfers:     Sit to Stand:  maximal assistance and of 2 persons with rolling walker and unable to achieve any kind of postural stability as tremulous tone does not quiet down upon prolonged  WB  Balance: fair static sitting balance      AM-PAC 6 CLICK MOBILITY  Total Score:10                   Treatment & Education:  Pt  educated in PT roles and goals, Fall prevention, safety     Patient left HOB elevated with all lines intact, call button in reach, bed alarm on, and NP present.             Problem: Physical Therapy  Goal: Physical Therapy Goal  Description: Goals to be met by: 24     Patient will increase functional independence with mobility by performin. Supine to sit with Stand-by Assistance  2. Sit to supine with MInimal Assistance  3. Sit to stand transfer with Moderate Assistance  4. Bed to chair transfer with Moderate Assistance using Rolling Walker  5. Gait  x 40 feet with Moderate Assistance using Rolling Walker.                      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    Osteopenia of multiple sites 3/9/2018     Based upon bone density measurements. Patient also has chronic kidney disease.    Stroke 2016               Past Surgical History:   Procedure Laterality Date    ANGIOGRAM, CORONARY, WITH LEFT HEART CATHETERIZATION N/A 2022     Procedure: Angiogram, Coronary, with Left Heart Cath;  Surgeon: Gino Leal MD;  Location: [facility] CATH/EP LAB;  Service: Cardiology;  Laterality: N/A;    CARDIAC SURGERY         stents    EYE SURGERY        WRIST SURGERY             Time Tracking:      PT Received On: 24  PT Start Time: 1406     PT Stop Time: 1418  PT Total Time (min): 12 min     Physical  Therapy Evaluation     Patient Name:  Tyra Isaac   MRN:  [id number]     Recommendations:      Discharge Recommendations: Low Intensity Therapy   Discharge Equipment Recommendations: wheelchair has a mobility limitation that significantly impairs her ability to participate in one or more mobility related activities in the community. The mobility limitation cannot be sufficiently resolved by the use of a cane or walker. The use of a transport wheelchair will significantly improve the patients ability to participate in the community and the patient will use it on regular basis outside the home. Tyra has expressed her willingness to use a transport wheelchair outside the home. she also has a caregiver who is available, willing, and able to provide assistance with the wheelchair when needed.   Barriers to discharge: None     Assessment:      Tyra Isaac is a 84 y.o. female admitted with a medical diagnosis of Bradycardia.  She presents with the following impairments/functional limitations: weakness, impaired endurance, impaired self care skills, impaired functional mobility, gait instability, impaired balance, decreased coordination, decreased upper extremity function, decreased lower extremity function, abnormal tone, impaired cardiopulmonary response to activity Pt found awake in bed. Agreeable to PT. Reports spitting up  with coughing and requests change of linen.Pt reports declining mobility over the last few months. Son is placing her in the care for HD. Today she is extremely tremulous with supine<> sit. Once seated at EOB and with deep pressure to Thighs, pt quiets down. Attempted sit<> stand x 2 trials with RW max A x 2. Again, pt extremely tremulous and has zero postural stability at this time,  unable to quiet. It is unclear how long she has been experiencing this. In July when PT was following her, she was tremulous but anita to ambulate with mod A x 40 ft. We will continue to follow her and  assess ability to progress with functional mobiility.     Rehab Prognosis: Fair and Poor; patient would benefit from acute skilled PT services to address these deficits and reach maximum level of function.    Recent Surgery: * No surgery found *       Plan:      During this hospitalization, patient to be seen 5 x/week to address the identified rehab impairments via gait training, therapeutic activities, therapeutic exercises and progress toward the following goals:     Plan of Care Expires:  09/13/24     Subjective      Chief Complaint: tremors, difficulty with feeding and mobility  Patient/Family Comments/goals: some independence  Pain/Comfort:  Pain Rating 1: 0/10  Pain Rating Post-Intervention 1: 0/10     Patients cultural, spiritual, Hindu conflicts given the current situation: no     Living Environment:Patient's son and DIL live with her is a [facility] with 2-3 KEY   Prior to admission, patients level of function was mod A transfers. She reports 2 falls in the past year.  Equipment used at home: rollator, wheelchair, bedside commode.  DME owned (not currently used): none.  Upon discharge, patient will have assistance from family.  .  Objective:      Communicated with nurseGayathri, prior to session.  Patient found HOB elevated with bed alarm, telemetry, peripheral IV  upon PT entry to room.     General Precautions: Standard, fall  Orthopedic Precautions:N/A   Braces: N/A  Respiratory Status: Room air     Exams:  Unable to MMT secondary to tremulous body  Cognitive Exam:  Patient is oriented to Person, Place, and Situation  Gross Motor Coordination:  poor     Functional Mobility:  Bed Mobility:     Supine to Sit: moderate assistance  Sit to Supine: moderate assistance  Transfers:     Sit to Stand:  maximal assistance and of 2 persons with rolling walker and unable to achieve any kind of postural stability as tremulous tone does not quiet down upon prolonged WB  Balance: fair static sitting balance        AM-PAC  6 CLICK MOBILITY  Total Score:10                   Treatment & Education:  Pt  educated in PT roles and goals, Fall prevention, safety     Patient left HOB elevated with all lines intact, call button in reach, bed alarm on, and NP present.     GOALS:   Multidisciplinary Problems         Physical Therapy Goals                  Problem: Physical Therapy     Goal Priority Disciplines Outcome Goal Variances Interventions   Physical Therapy Goal      PT, PT/OT Progressing       Description: Goals to be met by: 24     Patient will increase functional independence with mobility by performin. Supine to sit with Supervision  2. Sit to stand transfer with Supervision  3. Bed to chair transfer with Supervision using Rolling Walker  4. Gait  x 100 feet with Supervision using Rolling Walker.   5. 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    Osteopenia of multiple sites 3/9/2018     Based upon bone density measurements. Patient also has chronic kidney disease.    Stroke 2016               Past Surgical History:   Procedure Laterality Date    ANGIOGRAM, CORONARY, WITH LEFT HEART CATHETERIZATION N/A 2022     Procedure: Angiogram, Coronary, with Left Heart Cath;  Surgeon: Gino Leal MD;  Location: [facility] CATH/EP LAB;  Service: Cardiology;  Laterality: N/A;    CARDIAC SURGERY         stents    EYE SURGERY        WRIST SURGERY              Time Tracking:      PT Received On: 08/13/24  PT Start Time: 1406     PT Stop Time: 1418  PT Total Time (min): 12 min

## 2024-08-13 NOTE — PLAN OF CARE
Problem: Adult Inpatient Plan of Care  Goal: Plan of Care Review  Outcome: Progressing  Flowsheets (Taken 8/13/2024 1859)  Plan of Care Reviewed With: patient  Goal: Optimal Comfort and Wellbeing  Outcome: Progressing  Intervention: Provide Person-Centered Care  Flowsheets (Taken 8/13/2024 1859)  Trust Relationship/Rapport:   care explained   choices provided   reassurance provided   thoughts/feelings acknowledged   questions encouraged     Problem: Fall Injury Risk  Goal: Absence of Fall and Fall-Related Injury  Outcome: Progressing     Problem: Wound  Goal: Improved Oral Intake  Outcome: Progressing

## 2024-08-13 NOTE — PLAN OF CARE
Inpatient Upgrade Note    Tyra Isaac has warranted treatment spanning two or more midnights of hospital level care for the management of  Acute metabolic encephalopathy . She continues to require daily labs, further testing/imaging, monitoring of vital signs, medication adjustments, and further evaluation by consultants/Cardio- Her condition is also complicated by the following comorbidities: Hypertension, Chronic kidney disease, and Demand ischemia .

## 2024-08-13 NOTE — HPI
From admission HPI:  The patient is a 84 year old female with a PMHx of ESRD on HD (F - Dr. Mott), Atrial fibrillation (on Eliquis), HTN, CAD, COPD, previous CVA who presnted to the emergency department for the evaluation of generalized weakness x 3 weeks. Patient was recently evaluated in the emergency department here at [facility] 8/9/24 for the evaluation of shortness of breath with cough and generalized weakness. Chest xray at that time revealed right basilar ground-glass opacities, suspicious for viral/atypical pneumonia vs pulmonary edema. She was discharged home with doxycycline. Prior to that she was admitted here and discharged on 7/19/24 after a admission for bradycardia for which her amiodarone and metoprolol were discontinued per cardiology and she was placed on cardizem. However, upon follow up she was taken off her cardizem and placed back on amiodarone at 100 mg daily by her cardiologist Dr. Thompson. Patient went to HD this am with 1.5L off and patient reported generalized weakness following treatment. EMS was activated and spoke to patient's son who reports that she was been experiencing weakness and confusion over the past three weeks. She has also been having difficulty controlling her blood pressure and has had some medication changes as of recent.      Patient evaluated in the emergency department - COVID and flu negative. Electrolytes stable. BNP 3400, high sensitivity troponin 226, EKG with normal sinus rhythm, 71 beats per minute with incomplete right bundle-branch block. Septal infarct age undetermined with T-w    Palliative medicine consult:  Pt admitted for AMS, generalized weakness and pneumonia. Palliative medicine consulted for goals of care discussion.

## 2024-08-14 LAB
ALBUMIN SERPL BCP-MCNC: 3.6 G/DL (ref 3.5–5.2)
ALP SERPL-CCNC: 59 U/L (ref 55–135)
ALT SERPL W/O P-5'-P-CCNC: 17 U/L (ref 10–44)
ANION GAP SERPL CALC-SCNC: 15 MMOL/L (ref 8–16)
AST SERPL-CCNC: 23 U/L (ref 10–40)
BASOPHILS # BLD AUTO: 0.02 K/UL (ref 0–0.2)
BASOPHILS NFR BLD: 0.4 % (ref 0–1.9)
BILIRUB SERPL-MCNC: 0.7 MG/DL (ref 0.1–1)
BUN SERPL-MCNC: 46 MG/DL (ref 8–23)
CALCIUM SERPL-MCNC: 9.1 MG/DL (ref 8.7–10.5)
CHLORIDE SERPL-SCNC: 96 MMOL/L (ref 95–110)
CO2 SERPL-SCNC: 28 MMOL/L (ref 23–29)
CREAT SERPL-MCNC: 8.5 MG/DL (ref 0.5–1.4)
DIFFERENTIAL METHOD BLD: ABNORMAL
EOSINOPHIL # BLD AUTO: 0.4 K/UL (ref 0–0.5)
EOSINOPHIL NFR BLD: 7.7 % (ref 0–8)
ERYTHROCYTE [DISTWIDTH] IN BLOOD BY AUTOMATED COUNT: 21.1 % (ref 11.5–14.5)
EST. GFR  (NO RACE VARIABLE): 4.3 ML/MIN/1.73 M^2
GLUCOSE SERPL-MCNC: 77 MG/DL (ref 70–110)
GLUCOSE SERPL-MCNC: 94 MG/DL (ref 70–110)
HCT VFR BLD AUTO: 38.6 % (ref 37–48.5)
HGB BLD-MCNC: 11.5 G/DL (ref 12–16)
IMM GRANULOCYTES # BLD AUTO: 0.02 K/UL (ref 0–0.04)
IMM GRANULOCYTES NFR BLD AUTO: 0.4 % (ref 0–0.5)
LYMPHOCYTES # BLD AUTO: 0.7 K/UL (ref 1–4.8)
LYMPHOCYTES NFR BLD: 13 % (ref 18–48)
MAGNESIUM SERPL-MCNC: 1.9 MG/DL (ref 1.6–2.6)
MCH RBC QN AUTO: 26 PG (ref 27–31)
MCHC RBC AUTO-ENTMCNC: 29.8 G/DL (ref 32–36)
MCV RBC AUTO: 87 FL (ref 82–98)
MONOCYTES # BLD AUTO: 0.9 K/UL (ref 0.3–1)
MONOCYTES NFR BLD: 16.3 % (ref 4–15)
NEUTROPHILS # BLD AUTO: 3.5 K/UL (ref 1.8–7.7)
NEUTROPHILS NFR BLD: 62.2 % (ref 38–73)
NRBC BLD-RTO: 0 /100 WBC
PLATELET # BLD AUTO: 271 K/UL (ref 150–450)
PMV BLD AUTO: 10.1 FL (ref 9.2–12.9)
POTASSIUM SERPL-SCNC: 4.8 MMOL/L (ref 3.5–5.1)
PROT SERPL-MCNC: 6.8 G/DL (ref 6–8.4)
RBC # BLD AUTO: 4.42 M/UL (ref 4–5.4)
SODIUM SERPL-SCNC: 139 MMOL/L (ref 136–145)
WBC # BLD AUTO: 5.6 K/UL (ref 3.9–12.7)

## 2024-08-14 PROCEDURE — 63600175 PHARM REV CODE 636 W HCPCS: Performed by: NURSE PRACTITIONER

## 2024-08-14 PROCEDURE — 63600175 PHARM REV CODE 636 W HCPCS: Mod: JZ,JG | Performed by: INTERNAL MEDICINE

## 2024-08-14 PROCEDURE — 25000003 PHARM REV CODE 250: Performed by: INTERNAL MEDICINE

## 2024-08-14 PROCEDURE — 25000003 PHARM REV CODE 250: Performed by: NURSE PRACTITIONER

## 2024-08-14 PROCEDURE — 36415 COLL VENOUS BLD VENIPUNCTURE: CPT | Performed by: NURSE PRACTITIONER

## 2024-08-14 PROCEDURE — 90935 HEMODIALYSIS ONE EVALUATION: CPT

## 2024-08-14 PROCEDURE — 5A1D70Z PERFORMANCE OF URINARY FILTRATION, INTERMITTENT, LESS THAN 6 HOURS PER DAY: ICD-10-PCS | Performed by: INTERNAL MEDICINE

## 2024-08-14 PROCEDURE — 21400001 HC TELEMETRY ROOM

## 2024-08-14 PROCEDURE — 83735 ASSAY OF MAGNESIUM: CPT | Performed by: NURSE PRACTITIONER

## 2024-08-14 PROCEDURE — 80053 COMPREHEN METABOLIC PANEL: CPT | Performed by: NURSE PRACTITIONER

## 2024-08-14 PROCEDURE — 85025 COMPLETE CBC W/AUTO DIFF WBC: CPT | Performed by: NURSE PRACTITIONER

## 2024-08-14 RX ORDER — SODIUM CHLORIDE 9 MG/ML
INJECTION, SOLUTION INTRAVENOUS ONCE
Status: DISCONTINUED | OUTPATIENT
Start: 2024-08-14 | End: 2024-08-17 | Stop reason: HOSPADM

## 2024-08-14 RX ORDER — MUPIROCIN 20 MG/G
OINTMENT TOPICAL 2 TIMES DAILY
Status: DISCONTINUED | OUTPATIENT
Start: 2024-08-14 | End: 2024-08-17 | Stop reason: HOSPADM

## 2024-08-14 RX ORDER — HEPARIN SODIUM 5000 [USP'U]/ML
5000 INJECTION, SOLUTION INTRAVENOUS; SUBCUTANEOUS
Status: DISCONTINUED | OUTPATIENT
Start: 2024-08-14 | End: 2024-08-17 | Stop reason: HOSPADM

## 2024-08-14 RX ORDER — SODIUM CHLORIDE 9 MG/ML
INJECTION, SOLUTION INTRAVENOUS
Status: DISCONTINUED | OUTPATIENT
Start: 2024-08-14 | End: 2024-08-17 | Stop reason: HOSPADM

## 2024-08-14 RX ADMIN — EPOETIN ALFA-EPBX 2400 UNITS: 10000 INJECTION, SOLUTION INTRAVENOUS; SUBCUTANEOUS at 10:08

## 2024-08-14 RX ADMIN — HYDRALAZINE HYDROCHLORIDE 10 MG: 20 INJECTION INTRAMUSCULAR; INTRAVENOUS at 08:08

## 2024-08-14 RX ADMIN — LATANOPROST 1 DROP: 50 SOLUTION OPHTHALMIC at 08:08

## 2024-08-14 RX ADMIN — Medication 6 MG: at 08:08

## 2024-08-14 RX ADMIN — MUPIROCIN 1 G: 20 OINTMENT TOPICAL at 08:08

## 2024-08-14 RX ADMIN — ATORVASTATIN CALCIUM 40 MG: 40 TABLET, FILM COATED ORAL at 08:08

## 2024-08-14 RX ADMIN — APIXABAN 2.5 MG: 2.5 TABLET, FILM COATED ORAL at 08:08

## 2024-08-14 RX ADMIN — DOXYCYCLINE HYCLATE 100 MG: 100 CAPSULE ORAL at 08:08

## 2024-08-14 RX ADMIN — HYDRALAZINE HYDROCHLORIDE 10 MG: 20 INJECTION INTRAMUSCULAR; INTRAVENOUS at 12:08

## 2024-08-14 NOTE — CONSULTS
[facility]  Palliative Medicine  Consult Note    Patient Name: Tyra Isaac  MRN: [id number]  Admission Date: 8/12/2024  Hospital Length of Stay: 0 days  Code Status: Full Code   Attending Provider: German Michael DO  Consulting Provider: Tamara Barker NP  Primary Care Physician: Kalpesh Goel MD  Principal Problem:Acute metabolic encephalopathy    Patient information was obtained from relative(s), past medical records, ER records, and primary team.      Inpatient consult to Palliative Care  Consult performed by: Tamara Barker, NP  Consult ordered by: Portia White FNP        Assessment/Plan:     Palliative Care  Advance care planning  Advance Care Planning    Date: 08/13/2024    Code Status  In light of the patients advanced and life limiting illness,I engaged the the family in a voluntary conversation about the patient's preferences for care  at the very end of life. They understand she is high risk for cardiac or pulmonary arrest. They would like to keep pt full code for now and will discuss with siblings transitioning to DNR.       Goals of care, counseling/discussion  I reviewed the patient's chart and discussed the case with the patient's team.      I examined Tyra Isaac at bedside.  The patient lacks capacity for complex medical decision-making.  I spoke with pts son and daughter in law at bedside.    I introduced myself and my role as palliative care NP. They were agreeable to speaking.    We discussed the patient's medical illness, prognosis, and values in detail.  Below is a brief summary of our discussion.     Pt is known to me from previous hospitalization. Son reports since pts discharge she has been experiencing increasing confusion and generalized weakness. They understand pt has a weak heart and this is limiting her ability to tolerate dialysis and treatments. They feel pt is continuing to decline and understand that she is very debilitated.     Son and  daughter in laws main concern is that pt is comfortable and does not suffer. She has told them over the past 3 weeks that she is tired and just wants to go to sleep. I let them know that I am worried pt is at the end stage of her life and will cont to decline over the coming weeks.     We discussed prognosis. I told them given her advanced heart and kidney disease she is high risk for a complication to occur in the coming days to weeks that could result in her death. They were not surprised by this news.     I did use this opportunity to discuss hospice and the philosophy of hospice care. They understand this will mean pt will stop dialysis. I answered several questions.      Son would like to discuss with his siblings our conversation today before making a final decision about transitioning pt to hospice care.     I appreciate being involved in pts care. I will cont to follow along. Please reach out if I can be of any assistance.           Thank you for your consult. I will follow-up with patient. Please contact us if you have any additional questions.    Subjective:     HPI:   From admission HPI:  The patient is a 84 year old female with a PMHx of ESRD on HD (F - Dr. Mott), Atrial fibrillation (on Eliquis), HTN, CAD, COPD, previous CVA who presnted to the emergency department for the evaluation of generalized weakness x 3 weeks. Patient was recently evaluated in the emergency department here at [facility] 8/9/24 for the evaluation of shortness of breath with cough and generalized weakness. Chest xray at that time revealed right basilar ground-glass opacities, suspicious for viral/atypical pneumonia vs pulmonary edema. She was discharged home with doxycycline. Prior to that she was admitted here and discharged on 7/19/24 after a admission for bradycardia for which her amiodarone and metoprolol were discontinued per cardiology and she was placed on cardizem. However, upon follow up she was taken off her cardizem and  placed back on amiodarone at 100 mg daily by her cardiologist Dr. Thompson. Patient went to HD this am with 1.5L off and patient reported generalized weakness following treatment. EMS was activated and spoke to patient's son who reports that she was been experiencing weakness and confusion over the past three weeks. She has also been having difficulty controlling her blood pressure and has had some medication changes as of recent.      Patient evaluated in the emergency department - COVID and flu negative. Electrolytes stable. BNP 3400, high sensitivity troponin 226, EKG with normal sinus rhythm, 71 beats per minute with incomplete right bundle-branch block. Septal infarct age undetermined with T-w    Palliative medicine consult:  Pt admitted for AMS, generalized weakness and pneumonia. Palliative medicine consulted for goals of care discussion.        Hospital Course:  No notes on file    Interval History: Pt seen today for palliative medicine consult to discuss goals of care.     Past Medical History:   Diagnosis Date    A-fib     Anxiety     Depression     Disorder of kidney and ureter     Encephalopathy acute 1/1/2018    End stage kidney disease 6/17/2017    Gout     Hyperlipidemia     Hypertension     Moderate episode of recurrent major depressive disorder 1/17/2018    Nephropathy hypertensive, 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    Osteopenia of multiple sites 3/9/2018    Based upon bone density measurements. Patient also has chronic kidney disease.    Stroke 11/2016       Past Surgical History:   Procedure Laterality Date    ANGIOGRAM, CORONARY, WITH LEFT HEART CATHETERIZATION N/A 2/7/2022    Procedure: Angiogram, Coronary, with Left Heart Cath;  Surgeon: Gino Leal MD;  Location: [facility] CATH/EP LAB;  Service: Cardiology;  Laterality: N/A;    CARDIAC SURGERY      stents     EYE SURGERY      WRIST SURGERY         Review of patient's allergies indicates:   Allergen Reactions    Cyclobenzaprine     Fish containing products Hives    Peanut Other (See Comments)    Tramadol Itching       Medications:  Continuous Infusions:  Scheduled Meds:   amiodarone  100 mg Oral Daily    apixaban  2.5 mg Oral BID    atorvastatin  40 mg Oral Daily    doxycycline  100 mg Oral BID    [START ON 8/14/2024] epoetin rosy-epbx  50 Units/kg Subcutaneous Every Mon, Wed, Fri    latanoprost  1 drop Both Eyes QHS    sodium zirconium cyclosilicate  10 g Oral Every Tues, Thurs, Sat, Sun     PRN Meds:  Current Facility-Administered Medications:     acetaminophen, 650 mg, Oral, Q8H PRN    acetaminophen, 650 mg, Oral, Q4H PRN    aluminum-magnesium hydroxide-simethicone, 30 mL, Oral, QID PRN    dextrose 50%, 12.5 g, Intravenous, PRN    dextrose 50%, 25 g, Intravenous, PRN    glucagon (human recombinant), 1 mg, Intramuscular, PRN    glucose, 16 g, Oral, PRN    glucose, 24 g, Oral, PRN    hydrALAZINE, 10 mg, Intravenous, Q6H PRN    melatonin, 6 mg, Oral, Nightly PRN    naloxone, 0.02 mg, Intravenous, PRN    ondansetron, 4 mg, Intravenous, Q6H PRN    Family History       Problem Relation (Age of Onset)    Cancer Father    Heart disease Mother          Tobacco Use    Smoking status: Never    Smokeless tobacco: Never   Substance and Sexual Activity    Alcohol use: No    Drug use: No    Sexual activity: Not Currently       Review of Systems   Unable to perform ROS: Mental status change   Objective:     Vital Signs (Most Recent):  Temp: 98.7 °F (37.1 °C) (08/13/24 1534)  Pulse: 68 (08/13/24 1534)  Resp: 20 (08/13/24 1534)  BP: (!) 183/67 (08/13/24 1534)  SpO2: 96 % (08/13/24 1534) Vital Signs (24h Range):  Temp:  [97.7 °F (36.5 °C)-99 °F (37.2 °C)] 98.7 °F (37.1 °C)  Pulse:  [67-79] 68  Resp:  [18-20] 20  SpO2:  [93 %-98 %] 96 %  BP: (149-190)/(60-84) 183/67     Weight: 47.6 kg (104 lb 15 oz)  Body mass index is 17.46 kg/m².        Physical Exam  Vitals and nursing note reviewed.   Constitutional:       General: She is not in acute distress.     Appearance: She is ill-appearing.   Eyes:      General: No scleral icterus.     Pupils: Pupils are equal, round, and reactive to light.   Cardiovascular:      Rate and Rhythm: Normal rate and regular rhythm.      Pulses: Normal pulses.   Pulmonary:      Effort: Pulmonary effort is normal. No respiratory distress.   Skin:     General: Skin is warm and dry.   Neurological:      Mental Status: She is alert. She is disoriented.      Motor: Weakness present.        Review of Symptoms      Symptom Assessment (ESAS 0-10 Scale)  Unable to complete assessment due to Mental status change         Pain Assessment in Advanced Demential Scale (PAINAD)   Breathing - Independent of vocalization:  0  Negative vocalization:  0  Facial expression:  0  Body language:  0  Consolability:  0  Total:  0    Performance Status:  40    Living Arrangements:  Lives with family    Psychosocial/Cultural:   See Palliative Psychosocial Note: No  **Primary  to Follow**  Palliative Care  Consult: No    Advance Care Planning   Advance Directives:   Do Not Resuscitate Status: No      Decision Making:  Family answered questions  Goals of Care: The family endorses that what is most important right now is to focus on symptom/pain control, quality of life, even if it means sacrificing a little time, and improvement in condition but with limits to invasive therapies    Accordingly, we have decided that the best plan to meet the patient's goals includes continuing with treatment         Significant Labs: All pertinent labs within the past 24 hours have been reviewed.  CBC:   Recent Labs   Lab 08/13/24  0404   WBC 4.51   HGB 10.6*   HCT 36.7*   MCV 89        BMP:  Recent Labs   Lab 08/13/24  0404   GLU 60*      K 5.0   CL 97   CO2 31*   BUN 30*   CREATININE 6.7*   CALCIUM 9.0   MG 1.9     LFT:  Lab  Results   Component Value Date    AST 24 08/13/2024    ALKPHOS 59 08/13/2024    BILITOT 0.7 08/13/2024     Albumin:   Albumin   Date Value Ref Range Status   08/13/2024 3.6 3.5 - 5.2 g/dL Final     Protein:   Total Protein   Date Value Ref Range Status   08/13/2024 6.7 6.0 - 8.4 g/dL Final     Lactic acid:   Lab Results   Component Value Date    LACTATE 0.7 01/04/2024    LACTATE 2.2 (HH) 01/04/2024       Significant Imaging: I have reviewed all pertinent imaging results/findings within the past 24 hours.      I spent a total of 80 minutes on the day of the visit. This includes face to face time in discussion of goals of care, symptom assessment, coordination of care and emotional support.  This also includes non-face to face time preparing to see the patient (eg, review of tests/imaging), obtaining and/or reviewing separately obtained history, documenting clinical information in the electronic or other health record, independently interpreting results and communicating results to the patient/family/caregiver, or care coordinator.    Tamara Barker, NP  Palliative Medicine  [facility]

## 2024-08-14 NOTE — PT/OT/SLP PROGRESS
Occupational Therapy      Patient Name:  Tyra Isaac   MRN:  [id number]    Patient not seen today secondary to Dialysis. Will follow-up when appropriate.    8/14/2024

## 2024-08-14 NOTE — ASSESSMENT & PLAN NOTE
I reviewed the patient's chart and discussed the case with the patient's team.      I examined Tyra Isaac at bedside.  The patient lacks capacity for complex medical decision-making.  I spoke with pts son and daughter in law at bedside.    I introduced myself and my role as palliative care NP. They were agreeable to speaking.    We discussed the patient's medical illness, prognosis, and values in detail.  Below is a brief summary of our discussion.     Pt is known to me from previous hospitalization. Son reports since pts discharge she has been experiencing increasing confusion and generalized weakness. They understand pt has a weak heart and this is limiting her ability to tolerate dialysis and treatments. They feel pt is continuing to decline and understand that she is very debilitated.     Son and daughter in laws main concern is that pt is comfortable and does not suffer. She has told them over the past 3 weeks that she is tired and just wants to go to sleep. I let them know that I am worried pt is at the end stage of her life and will cont to decline over the coming weeks.     We discussed prognosis. I told them given her advanced heart and kidney disease she is high risk for a complication to occur in the coming days to weeks that could result in her death. They were not surprised by this news.     I did use this opportunity to discuss hospice and the philosophy of hospice care. They understand this will mean pt will stop dialysis. I answered several questions.      Son would like to discuss with his siblings our conversation today before making a final decision about transitioning pt to hospice care.     I appreciate being involved in pts care. I will cont to follow along. Please reach out if I can be of any assistance.

## 2024-08-14 NOTE — SUBJECTIVE & OBJECTIVE
Interval History: Pt seen on HD. She is tolerating it well so far. She remains confused and weak. I spoke with pts son Raffi via telephone.     Medications:  Continuous Infusions:  Scheduled Meds:   0.9% NaCl   Intravenous Once    amiodarone  100 mg Oral Daily    apixaban  2.5 mg Oral BID    atorvastatin  40 mg Oral Daily    doxycycline  100 mg Oral BID    epoetin rosy-epbx  50 Units/kg Subcutaneous Every Mon, Wed, Fri    latanoprost  1 drop Both Eyes QHS    mupirocin   Nasal BID    sodium zirconium cyclosilicate  10 g Oral Every Tues, Thurs, Sat, Sun     PRN Meds:  Current Facility-Administered Medications:     0.9% NaCl, , Intravenous, PRN    acetaminophen, 650 mg, Oral, Q8H PRN    acetaminophen, 650 mg, Oral, Q4H PRN    aluminum-magnesium hydroxide-simethicone, 30 mL, Oral, QID PRN    dextrose 50%, 12.5 g, Intravenous, PRN    dextrose 50%, 25 g, Intravenous, PRN    glucagon (human recombinant), 1 mg, Intramuscular, PRN    glucose, 16 g, Oral, PRN    glucose, 24 g, Oral, PRN    heparin (porcine), 5,000 Units, Intravenous, PRN    hydrALAZINE, 10 mg, Intravenous, Q6H PRN    melatonin, 6 mg, Oral, Nightly PRN    naloxone, 0.02 mg, Intravenous, PRN    ondansetron, 4 mg, Intravenous, Q6H PRN    sodium chloride 0.9%, 250 mL, Intravenous, PRN    Objective:     Vital Signs (Most Recent):  Temp: 97.9 °F (36.6 °C) (08/14/24 0905)  Pulse: 101 (08/14/24 0930)  Resp: 18 (08/14/24 0905)  BP: (!) 165/82 (08/14/24 0930)  SpO2: 97 % (08/14/24 0905) Vital Signs (24h Range):  Temp:  [97.8 °F (36.6 °C)-98.7 °F (37.1 °C)] 97.9 °F (36.6 °C)  Pulse:  [] 101  Resp:  [17-20] 18  SpO2:  [96 %-98 %] 97 %  BP: (140-201)/(67-86) 165/82     Weight: 47.6 kg (104 lb 15 oz)  Body mass index is 17.46 kg/m².       Physical Exam  Vitals and nursing note reviewed.   Constitutional:       General: She is not in acute distress.     Appearance: She is ill-appearing.      Comments: Drowsy, arouses to name called   Cardiovascular:      Rate and  Rhythm: Normal rate and regular rhythm.      Pulses: Normal pulses.   Pulmonary:      Effort: Pulmonary effort is normal. No respiratory distress.   Skin:     General: Skin is warm and dry.   Neurological:      Mental Status: She is disoriented.      Motor: Weakness present.            Review of Symptoms      Symptom Assessment (ESAS 0-10 Scale)  Unable to complete assessment due to Mental status change         Pain Assessment in Advanced Demential Scale (PAINAD)   Breathing - Independent of vocalization:  0  Negative vocalization:  0  Facial expression:  0  Body language:  0  Consolability:  0  Total:  0    Performance Status:  30    Living Arrangements:  Lives with family    Psychosocial/Cultural:   See Palliative Psychosocial Note: No  **Primary  to Follow**  Palliative Care  Consult: No        Advance Care Planning   Advance Directives:   Do Not Resuscitate Status: No      Decision Making:  Family answered questions  Goals of Care: What is most important right now is to focus on symptom/pain control, quality of life, even if it means sacrificing a little time, improvement in condition but with limits to invasive therapies. Accordingly, we have decided that the best plan to meet the patient's goals includes continuing with treatment.         Significant Labs: All pertinent labs within the past 24 hours have been reviewed.  CBC:   Recent Labs   Lab 08/14/24  0351   WBC 5.60   HGB 11.5*   HCT 38.6   MCV 87        BMP:  Recent Labs   Lab 08/14/24  0351   GLU 77      K 4.8   CL 96   CO2 28   BUN 46*   CREATININE 8.5*   CALCIUM 9.1   MG 1.9     LFT:  Lab Results   Component Value Date    AST 23 08/14/2024    ALKPHOS 59 08/14/2024    BILITOT 0.7 08/14/2024     Albumin:   Albumin   Date Value Ref Range Status   08/14/2024 3.6 3.5 - 5.2 g/dL Final     Protein:   Total Protein   Date Value Ref Range Status   08/14/2024 6.8 6.0 - 8.4 g/dL Final     Lactic acid:   Lab Results    Component Value Date    LACTATE 0.7 01/04/2024    LACTATE 2.2 (HH) 01/04/2024       Significant Imaging: I have reviewed all pertinent imaging results/findings within the past 24 hours.

## 2024-08-14 NOTE — NURSING
Nurses Note -- 4 Eyes      8/14/2024   2:42 PM      Skin assessed during: Q Shift Change      [x] No Altered Skin Integrity Present    [x]Prevention Measures Documented      [] Yes- Altered Skin Integrity Present or Discovered   [] LDA Added if Not in Epic (Describe Wound)   [] New Altered Skin Integrity was Present on Admit and Documented in LDA   [] Wound Image Taken    Wound Care Consulted? No    Attending Nurse:  Kasey Henao RN/Staff Member:   Gayathri REYES

## 2024-08-14 NOTE — ASSESSMENT & PLAN NOTE
I reviewed the patient's chart and discussed the case with the patient's team.      I examined Tyra Isaac at bedside.  The patient lacks capacity for complex medical decision-making.  I spoke with pts son via telephone.    I introduced myself and my role as palliative care NP. He was agreeable to speaking.    Pt seen on HD. Per nurse she is tolerating well so far and bp is good. Pt remains weak and speech is mumbled and difficult to understand.     I spoke with son Raffi via telephone. He recalls our conversation from yesterday. He spoke with each of his siblings briefly yesterday. He is arranging a time when they can all speak together to discuss everything and make a decision together on transitioning their mother to comfort care.     He wants to cont with treatment for now and full code status.     I appreciate being involved in pts care. I will cont to follow along. Please reach out if I can be of any assistance.

## 2024-08-14 NOTE — PT/OT/SLP PROGRESS
Physical Therapy      Patient Name:  Tyra Isaac   MRN:  [id number]    Patient not seen today secondary to AM attempt: dialysis.   PM attempt: nursing care, and NP entering room.   Will follow-up 8/15/24.

## 2024-08-14 NOTE — PROGRESS NOTES
[facility] Medicine  Progress Note    Patient Name: Tyra Isaac  MRN: [id number]  Patient Class: IP- Inpatient   Admission Date: 8/12/2024  Length of Stay: 1 days  Attending Physician: German Michael DO  Primary Care Provider: Kalpesh Goel MD        Subjective:     Principal Problem:Acute metabolic encephalopathy        HPI:  The patient is a 84 year old female with a PMHx of ESRD on HD ([facility] - Dr. Mott), Atrial fibrillation (on Eliquis), HTN, CAD, COPD, previous CVA who presnted to the emergency department for the evaluation of generalized weakness x 3 weeks. Patient was recently evaluated in the emergency department here at [facility] 8/9/24 for the evaluation of shortness of breath with cough and generalized weakness. Chest xray at that time revealed right basilar ground-glass opacities, suspicious for viral/atypical pneumonia vs pulmonary edema. She was discharged home with doxycycline. Prior to that she was admitted here and discharged on 7/19/24 after a admission for bradycardia for which her amiodarone and metoprolol were discontinued per cardiology and she was placed on cardizem. However, upon follow up she was taken off her cardizem and placed back on amiodarone at 100 mg daily by her cardiologist Dr. Thompson. Patient went to HD this am with 1.5L off and patient reported generalized weakness following treatment. EMS was activated and spoke to patient's son who reports that she was been experiencing weakness and confusion over the past three weeks. She has also been having difficulty controlling her blood pressure and has had some medication changes as of recent.     Patient evaluated in the emergency department - COVID and flu negative. Electrolytes stable. BNP 3400, high sensitivity troponin 226, EKG with normal sinus rhythm, 71 beats per minute with incomplete right bundle-branch block. Septal infarct age undetermined with T-wave abnormality in the inferior and anterior leads.  Potassium has normalized and very reassuring, continue current regimen and follow up as planned Patient admitted to hospital medicine service for further evaluation.           Overview/Hospital Course:  84-year-old female admitted with confusion, weakness, tremors. She continued to have tremors and facial jerking.  No further generalized weakness.  Patient has remained hemodynamically stable.  CBC with mild anemia secondary for ESRD, CMP with stable electrolytes and consistent with ESRD.  Tropnin trended down from 226 on admit to 170.  CT head without contrast with no interval changes from previous MRI in 11/2022, chronic age-related changes, small foci of lacunar infarction that is stable from previous imaging.  Per further chart review, patient was prescribed Reglan for possible gastroparesis in July 2024.  Will discontinue Reglan at this time as it may be contributing to her tremors as well as facial movements and confusion.  Maintained on HD schedule per nephrology.  PT/OT consulted.  Palliative care team has been called consulted and is following patient.    Interval History:  Patient seen and examined.  NAD.  Had dialysis this morning.  Palliative care team to discuss further goals with family tomorrow.      Review of Systems   Reason unable to perform ROS: Confusion.     Objective:     Vital Signs (Most Recent):  Temp: 98.2 °F (36.8 °C) (08/14/24 1345)  Pulse: 91 (08/14/24 1345)  Resp: 18 (08/14/24 1345)  BP: (!) 151/70 (08/14/24 1345)  SpO2: 95 % (08/14/24 1345) Vital Signs (24h Range):  Temp:  [97.8 °F (36.6 °C)-98.7 °F (37.1 °C)] 98.2 °F (36.8 °C)  Pulse:  [] 91  Resp:  [17-20] 18  SpO2:  [95 %-97 %] 95 %  BP: ()/(49-86) 151/70     Weight: 47.6 kg (104 lb 15 oz)  Body mass index is 17.46 kg/m².    Intake/Output Summary (Last 24 hours) at 8/14/2024 1444  Last data filed at 8/14/2024 1220  Gross per 24 hour   Intake 0 ml   Output 2000 ml   Net -2000 ml         Physical Exam  Vitals and nursing note reviewed.   Constitutional:       General: She is not in acute distress.     Appearance: She  is ill-appearing (Chronically).   HENT:      Head: Normocephalic.      Mouth/Throat:      Mouth: Mucous membranes are moist.      Pharynx: Oropharynx is clear.   Cardiovascular:      Rate and Rhythm: Normal rate and regular rhythm.      Pulses: Normal pulses.      Heart sounds: Normal heart sounds.   Pulmonary:      Effort: Pulmonary effort is normal.      Breath sounds: Normal breath sounds.   Abdominal:      General: Abdomen is flat. Bowel sounds are normal. There is no distension.      Palpations: Abdomen is soft.      Tenderness: There is no abdominal tenderness. There is no guarding.   Musculoskeletal:         General: Normal range of motion.   Skin:     General: Skin is warm and dry.      Capillary Refill: Capillary refill takes less than 2 seconds.   Neurological:      General: No focal deficit present.      Mental Status: She is alert.      Comments: Tremors and facial twitching noted             Significant Labs: All pertinent labs within the past 24 hours have been reviewed.  CBC:   Recent Labs   Lab 08/13/24  0404 08/14/24  0351   WBC 4.51 5.60   HGB 10.6* 11.5*   HCT 36.7* 38.6    271     CMP:   Recent Labs   Lab 08/13/24  0404 08/14/24  0351    139   K 5.0 4.8   CL 97 96   CO2 31* 28   GLU 60* 77   BUN 30* 46*   CREATININE 6.7* 8.5*   CALCIUM 9.0 9.1   PROT 6.7 6.8   ALBUMIN 3.6 3.6   BILITOT 0.7 0.7   ALKPHOS 59 59   AST 24 23   ALT 16 17   ANIONGAP 12 15     Magnesium:   Recent Labs   Lab 08/13/24  0404 08/14/24  0351   MG 1.9 1.9     Troponin:   Recent Labs   Lab 08/12/24  1640   TROPONINIHS 177.0*       Significant Imaging:  I have reviewed all pertinent imaging results/findings within the past 24 hours.     Assessment/Plan:      * Acute metabolic encephalopathy  Patient presents with generalized confusion with three week history of confusion  Recently treated for possible viral pneumonia continue doxy   Recurrent episodes of confusion, frequent hospitalization, chronic renal disease  and debility - will consult palliative care   UA without bacteria  CT brain without contrast without acute findings - previous CVA noted stable from prior exam, age related changes  DC Reglan as this could be affecting patient's cognition and causing tremors   PT/OT ordered for evaluation   Supportive care     Goals of care, counseling/discussion  Palliative care team following         ESRD (end stage renal disease)  Patient ESRD HD MWF  Last HD Monday 8/12/24 with 1.5L off  Lytes stable  Daily BMP  Renal diet  Renal dose medication  Consult nephrology Dr. Mott     Demand ischemia  Initial troponin 226  EKG normal sinus rhythm, 71 beats per minute with incomplete right bundle-branch block. Septal infarct age undetermined with T-wave abnormality in the inferior and anterior leads.   Patient with history of ESRD - last troponin from 8/9/24 34   Last echo from 6/28/24 with EF 64%, mild AS, mod MR, no WMA   Nuclear stress 6/28/24 without reversible ischemia   Cardiology consulted   Troponin trending down from 226 to 177   Continue telemetry monitoring     Atrial fibrillation  Patient currently in NSR  Continue amiodarone 100 mg daily  Continue Eliquis 2.5 mg bid   Continue telemetry monitoring   Cardiology consulted     Benign hypertension with CKD (chronic kidney disease) stage V  Chronic, controlled.  Latest blood pressure and vitals reviewed-     Temp:  [97.8 °F (36.6 °C)-98.7 °F (37.1 °C)]   Pulse:  []   Resp:  [17-20]   BP: ()/(49-86)   SpO2:  [95 %-97 %] .   Home meds for hypertension were reviewed and noted below-    While in the hospital, will manage blood pressure as follows; Continue home antihypertensive regimen    Will utilize p.r.n. blood pressure medication only if patient's blood pressure greater than 180/110 and she develops symptoms such as worsening chest pain or shortness of breath.        VTE Risk Mitigation (From admission, onward)           Ordered     heparin (porcine) injection 5,000  Units  As needed (PRN)         08/14/24 0810     apixaban tablet 2.5 mg  2 times daily         08/12/24 1613     IP VTE HIGH RISK PATIENT  Once         08/12/24 1536     Place sequential compression device  Until discontinued         08/12/24 1536                    Discharge Planning   ILAN: 8/15/2024     Code Status: Full Code   Is the patient medically ready for discharge?:     Reason for patient still in hospital (select all that apply): Patient trending condition and Pending disposition  Discharge Plan A: Home Health                  KELBY Wheatley  Department of Hospital Medicine   [facility]

## 2024-08-14 NOTE — PROGRESS NOTES
[facility]  Palliative Medicine  Progress Note    Patient Name: Tyra Isaac  MRN: [id number]  Admission Date: 8/12/2024  Hospital Length of Stay: 1 days  Code Status: Full Code   Attending Provider: German Michael DO  Consulting Provider: Tamara Barker NP  Primary Care Physician: Kalpesh oGel MD  Principal Problem:Acute metabolic encephalopathy    Patient information was obtained from patient, relative(s), past medical records, ER records, and primary team.      Assessment/Plan:     Palliative Care  Goals of care, counseling/discussion  I reviewed the patient's chart and discussed the case with the patient's team.      I examined Tyra Isaac at bedside.  The patient lacks capacity for complex medical decision-making.  I spoke with pts son via telephone.    I introduced myself and my role as palliative care NP. He was agreeable to speaking.    Pt seen on HD. Per nurse she is tolerating well so far and bp is good. Pt remains weak and speech is mumbled and difficult to understand.     I spoke with son Raffi via telephone. He recalls our conversation from yesterday. He spoke with each of his siblings briefly yesterday. He is arranging a time when they can all speak together to discuss everything and make a decision together on transitioning their mother to comfort care.     He wants to cont with treatment for now and full code status.     I appreciate being involved in pts care. I will cont to follow along. Please reach out if I can be of any assistance.           I will follow-up with patient. Please contact us if you have any additional questions.    Subjective:     Chief Complaint:   Chief Complaint   Patient presents with    Generalized Weakness     Patient arrived to dialysis with generalized weakness - dialysis completed with 1.5L removed then EMS called for weakness - EMS talked to patient's son and stated that patient has been having generalized weakness and confusion for  the past 3 weeks - hx of bradycardia and afib - pt's son states that BP has been difficult to control and has been trying different cardiac medications - currently on metoprolol.       HPI:   From admission HPI:  The patient is a 84 year old female with a PMHx of ESRD on HD ([facility] - Dr. Mott), Atrial fibrillation (on Eliquis), HTN, CAD, COPD, previous CVA who presnted to the emergency department for the evaluation of generalized weakness x 3 weeks. Patient was recently evaluated in the emergency department here at [facility] 8/9/24 for the evaluation of shortness of breath with cough and generalized weakness. Chest xray at that time revealed right basilar ground-glass opacities, suspicious for viral/atypical pneumonia vs pulmonary edema. She was discharged home with doxycycline. Prior to that she was admitted here and discharged on 7/19/24 after a admission for bradycardia for which her amiodarone and metoprolol were discontinued per cardiology and she was placed on cardizem. However, upon follow up she was taken off her cardizem and placed back on amiodarone at 100 mg daily by her cardiologist Dr. Thompson. Patient went to HD this am with 1.5L off and patient reported generalized weakness following treatment. EMS was activated and spoke to patient's son who reports that she was been experiencing weakness and confusion over the past three weeks. She has also been having difficulty controlling her blood pressure and has had some medication changes as of recent.      Patient evaluated in the emergency department - COVID and flu negative. Electrolytes stable. BNP 3400, high sensitivity troponin 226, EKG with normal sinus rhythm, 71 beats per minute with incomplete right bundle-branch block. Septal infarct age undetermined with T-w    Palliative medicine consult:  Pt admitted for AMS, generalized weakness and pneumonia. Palliative medicine consulted for goals of care discussion.        Hospital Course:  No notes on  file    Interval History: Pt seen on HD. She is tolerating it well so far. She remains confused and weak. I spoke with pts son Raffi via telephone.     Medications:  Continuous Infusions:  Scheduled Meds:   0.9% NaCl   Intravenous Once    amiodarone  100 mg Oral Daily    apixaban  2.5 mg Oral BID    atorvastatin  40 mg Oral Daily    doxycycline  100 mg Oral BID    epoetin rosy-epbx  50 Units/kg Subcutaneous Every Mon, Wed, Fri    latanoprost  1 drop Both Eyes QHS    mupirocin   Nasal BID    sodium zirconium cyclosilicate  10 g Oral Every Tues, Thurs, Sat, Sun     PRN Meds:  Current Facility-Administered Medications:     0.9% NaCl, , Intravenous, PRN    acetaminophen, 650 mg, Oral, Q8H PRN    acetaminophen, 650 mg, Oral, Q4H PRN    aluminum-magnesium hydroxide-simethicone, 30 mL, Oral, QID PRN    dextrose 50%, 12.5 g, Intravenous, PRN    dextrose 50%, 25 g, Intravenous, PRN    glucagon (human recombinant), 1 mg, Intramuscular, PRN    glucose, 16 g, Oral, PRN    glucose, 24 g, Oral, PRN    heparin (porcine), 5,000 Units, Intravenous, PRN    hydrALAZINE, 10 mg, Intravenous, Q6H PRN    melatonin, 6 mg, Oral, Nightly PRN    naloxone, 0.02 mg, Intravenous, PRN    ondansetron, 4 mg, Intravenous, Q6H PRN    sodium chloride 0.9%, 250 mL, Intravenous, PRN    Objective:     Vital Signs (Most Recent):  Temp: 97.9 °F (36.6 °C) (08/14/24 0905)  Pulse: 101 (08/14/24 0930)  Resp: 18 (08/14/24 0905)  BP: (!) 165/82 (08/14/24 0930)  SpO2: 97 % (08/14/24 0905) Vital Signs (24h Range):  Temp:  [97.8 °F (36.6 °C)-98.7 °F (37.1 °C)] 97.9 °F (36.6 °C)  Pulse:  [] 101  Resp:  [17-20] 18  SpO2:  [96 %-98 %] 97 %  BP: (140-201)/(67-86) 165/82     Weight: 47.6 kg (104 lb 15 oz)  Body mass index is 17.46 kg/m².       Physical Exam  Vitals and nursing note reviewed.   Constitutional:       General: She is not in acute distress.     Appearance: She is ill-appearing.      Comments: Drowsy, arouses to name called   Cardiovascular:      Rate  and Rhythm: Normal rate and regular rhythm.      Pulses: Normal pulses.   Pulmonary:      Effort: Pulmonary effort is normal. No respiratory distress.   Skin:     General: Skin is warm and dry.   Neurological:      Mental Status: She is disoriented.      Motor: Weakness present.            Review of Symptoms      Symptom Assessment (ESAS 0-10 Scale)  Unable to complete assessment due to Mental status change         Pain Assessment in Advanced Demential Scale (PAINAD)   Breathing - Independent of vocalization:  0  Negative vocalization:  0  Facial expression:  0  Body language:  0  Consolability:  0  Total:  0    Performance Status:  30    Living Arrangements:  Lives with family    Psychosocial/Cultural:   See Palliative Psychosocial Note: No  **Primary  to Follow**  Palliative Care  Consult: No        Advance Care Planning  Advance Directives:   Do Not Resuscitate Status: No      Decision Making:  Family answered questions  Goals of Care: What is most important right now is to focus on symptom/pain control, quality of life, even if it means sacrificing a little time, improvement in condition but with limits to invasive therapies. Accordingly, we have decided that the best plan to meet the patient's goals includes continuing with treatment.         Significant Labs: All pertinent labs within the past 24 hours have been reviewed.  CBC:   Recent Labs   Lab 08/14/24  0351   WBC 5.60   HGB 11.5*   HCT 38.6   MCV 87        BMP:  Recent Labs   Lab 08/14/24  0351   GLU 77      K 4.8   CL 96   CO2 28   BUN 46*   CREATININE 8.5*   CALCIUM 9.1   MG 1.9     LFT:  Lab Results   Component Value Date    AST 23 08/14/2024    ALKPHOS 59 08/14/2024    BILITOT 0.7 08/14/2024     Albumin:   Albumin   Date Value Ref Range Status   08/14/2024 3.6 3.5 - 5.2 g/dL Final     Protein:   Total Protein   Date Value Ref Range Status   08/14/2024 6.8 6.0 - 8.4 g/dL Final     Lactic acid:   Lab Results    Component Value Date    LACTATE 0.7 01/04/2024    LACTATE 2.2 (HH) 01/04/2024       Significant Imaging: I have reviewed all pertinent imaging results/findings within the past 24 hours.    60 min visit spent in chart review, face to face discussion of goals of care,  symptom assessment, coordination of care and emotional support.     Tamara Barker NP  Palliative Medicine  [facility]

## 2024-08-14 NOTE — PROGRESS NOTES
08/14/24 1220   Vital Signs   Temp 98 °F (36.7 °C)   Pulse 79   Resp 18   SpO2 96 %   BP (!) 141/67   Post-Hemodialysis Assessment   Rinseback Volume (mL) 250 mL   Blood Volume Processed (Liters) 57 L   Dialyzer Clearance Lightly streaked   Duration of Treatment 180 minutes   Additional Fluid Intake (mL) 0 mL   Total UF (mL) 2000 mL   Net Fluid Removal 1500   Patient Response to Treatment daniela fair   Post-Treatment Weight 46.4 kg (102 lb 4.7 oz)   Treatment Weight Change -1.5   Arterial bleeding stop time (min) 10 min   Venous bleeding stop time (min) 10 min   Post-Hemodialysis Comments vss, hemostasis achieved     Pt educated on fluid intake. Report called to Kasey PAGAN

## 2024-08-14 NOTE — ASSESSMENT & PLAN NOTE
Chronic, controlled.  Latest blood pressure and vitals reviewed-     Temp:  [97.8 °F (36.6 °C)-98.7 °F (37.1 °C)]   Pulse:  []   Resp:  [17-20]   BP: ()/(49-86)   SpO2:  [95 %-97 %] .   Home meds for hypertension were reviewed and noted below-    While in the hospital, will manage blood pressure as follows; Continue home antihypertensive regimen    Will utilize p.r.n. blood pressure medication only if patient's blood pressure greater than 180/110 and she develops symptoms such as worsening chest pain or shortness of breath.

## 2024-08-14 NOTE — ASSESSMENT & PLAN NOTE
Advance Care Planning     Date: 08/13/2024    Code Status  In light of the patients advanced and life limiting illness,I engaged the the family in a voluntary conversation about the patient's preferences for care  at the very end of life. They understand she is high risk for cardiac or pulmonary arrest. They would like to keep pt full code for now and will discuss with siblings transitioning to DNR.

## 2024-08-14 NOTE — PROGRESS NOTES
INPATIENT NEPHROLOGY CONSULT   [facility] NEPHROLOGY    Tyra Isaac  08/14/2024    Reason for consultation:  esrd    Chief Complaint:   Chief Complaint   Patient presents with    Generalized Weakness     Patient arrived to dialysis with generalized weakness - dialysis completed with 1.5L removed then EMS called for weakness - EMS talked to patient's son and stated that patient has been having generalized weakness and confusion for the past 3 weeks - hx of bradycardia and afib - pt's son states that BP has been difficult to control and has been trying different cardiac medications - currently on metoprolol.     History of Present Illness:     Per H and P  The patient is a 84 year old female with a PMHx of ESRD on HD ([facility] - Dr. Mott), Atrial fibrillation (on Eliquis), HTN, CAD, COPD, previous CVA who presnted to the emergency department for the evaluation of generalized weakness x 3 weeks. Patient was recently evaluated in the emergency department here at [facility] 8/9/24 for the evaluation of shortness of breath with cough and generalized weakness. Chest xray at that time revealed right basilar ground-glass opacities, suspicious for viral/atypical pneumonia vs pulmonary edema. She was discharged home with doxycycline. Prior to that she was admitted here and discharged on 7/19/24 after a admission for bradycardia for which her amiodarone and metoprolol were discontinued per cardiology and she was placed on cardizem. However, upon follow up she was taken off her cardizem and placed back on amiodarone at 100 mg daily by her cardiologist Dr. Thompson. Patient went to HD this am with 1.5L off and patient reported generalized weakness following treatment. EMS was activated and spoke to patient's son who reports that she was been experiencing weakness and confusion over the past three weeks. She has also been having difficulty controlling her blood pressure and has had some medication changes as of recent.      Patient  evaluated in the emergency department - COVID and flu negative. Electrolytes stable. BNP 3400, high sensitivity troponin 226, EKG with normal sinus rhythm, 71 beats per minute with incomplete right bundle-branch block. Septal infarct age undetermined with T-wave abnormality in the inferior and anterior leads. Patient admitted to hospital medicine service for further evaluation    8/14 VSS. Seen on HD today.    Plan of Care:    esrd  --continue dialysis per routine  --fluid restrict  --renal dose medication per routine  --continue outpt medication  --continue binders with meals    Anemia of CKD  --erythropoiesis stimulating agent with renal replacement therapy    Secondary HPT  --renal diet  --continue binders    Hypertension  --uf with hd  --fluid restrict  --low salt diet  --continue home medication    Thank you for allowing us to participate in this patient's care. We will continue to follow.    Vital Signs:  Temp Readings from Last 3 Encounters:   08/14/24 97.8 °F (36.6 °C) (Oral)   08/09/24 98.2 °F (36.8 °C) (Oral)   07/30/24 98.6 °F (37 °C)       Pulse Readings from Last 3 Encounters:   08/14/24 79   08/09/24 68   07/30/24 (P) 65       BP Readings from Last 3 Encounters:   08/14/24 (!) 182/82   08/09/24 (!) 144/68   07/30/24 (!) 118/52       Weight:  Wt Readings from Last 3 Encounters:   08/13/24 47.6 kg (104 lb 15 oz)   08/09/24 46.3 kg (102 lb)   07/30/24 44.9 kg (99 lb)       Past Medical & Surgical History:  Past Medical History:   Diagnosis Date    A-fib     Anxiety     Depression     Disorder of kidney and ureter     Encephalopathy acute 1/1/2018    End stage kidney disease 6/17/2017    Gout     Hyperlipidemia     Hypertension     Moderate episode of recurrent major depressive disorder 1/17/2018    Nephropathy hypertensive, 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    Osteopenia  of multiple sites 3/9/2018    Based upon bone density measurements. Patient also has chronic kidney disease.    Stroke 11/2016       Past Surgical History:   Procedure Laterality Date    ANGIOGRAM, CORONARY, WITH LEFT HEART CATHETERIZATION N/A 2/7/2022    Procedure: Angiogram, Coronary, with Left Heart Cath;  Surgeon: Gino Leal MD;  Location: [facility] CATH/EP LAB;  Service: Cardiology;  Laterality: N/A;    CARDIAC SURGERY      stents    EYE SURGERY      WRIST SURGERY         Past Social History:  Social History     Socioeconomic History    Marital status:      Spouse name: Aria Isaac    Number of children: 3   Occupational History    Occupation: Not working   Tobacco Use    Smoking status: Never    Smokeless tobacco: Never   Substance and Sexual Activity    Alcohol use: No    Drug use: No    Sexual activity: Not Currently     Social Determinants of Health     Financial Resource Strain: Low Risk  (8/13/2024)    Overall Financial Resource Strain (CARDIA)     Difficulty of Paying Living Expenses: Not hard at all   Recent Concern: Financial Resource Strain - Medium Risk (7/11/2024)    Overall Financial Resource Strain (CARDIA)     Difficulty of Paying Living Expenses: Somewhat hard   Food Insecurity: No Food Insecurity (8/13/2024)    Hunger Vital Sign     Worried About Running Out of Food in the Last Year: Never true     Ran Out of Food in the Last Year: Never true   Transportation Needs: No Transportation Needs (8/13/2024)    TRANSPORTATION NEEDS     Transportation : No   Physical Activity: Inactive (8/13/2024)    Exercise Vital Sign     Days of Exercise per Week: 0 days     Minutes of Exercise per Session: 0 min   Stress: Patient Unable To Answer (8/13/2024)    [employer] - Occupational Stress Questionnaire     Feeling of Stress : Patient unable to answer   Recent Concern: Stress - Stress Concern Present (7/11/2024)    [employer] -  Occupational Stress Questionnaire     Feeling of Stress : To some extent   Housing Stability: Low Risk  (8/13/2024)    Housing Stability Vital Sign     Unable to Pay for Housing in the Last Year: No     Homeless in the Last Year: No       Medications:  No current facility-administered medications on file prior to encounter.     Current Outpatient Medications on File Prior to Encounter   Medication Sig Dispense Refill    amiodarone (PACERONE) 100 MG Tab Take 1 tablet (100 mg total) by mouth once daily. 30 tablet 0    atorvastatin (LIPITOR) 40 MG tablet Take 40 mg by mouth once daily.      b complex vitamins tablet Take 1 tablet by mouth once daily.      doxycycline (VIBRAMYCIN) 100 MG Cap Take 1 capsule (100 mg total) by mouth 2 (two) times daily. for 10 days 20 capsule 0    ELIQUIS 2.5 mg Tab Take 2.5 mg by mouth 2 (two) times daily.      latanoprost 0.005 % ophthalmic solution Place 1 drop into both eyes every evening.      metoclopramide HCl (REGLAN) 5 MG tablet Take 1 tablet (5 mg total) by mouth 3 (three) times daily before meals. 30 tablet 1    midodrine (PROAMATINE) 10 MG tablet Take 1 tablet (10 mg total) by mouth 3 (three) times daily with meals. 270 tablet 0    ondansetron (ZOFRAN-ODT) 4 MG TbDL Take 1 tablet (4 mg total) by mouth every 6 (six) hours as needed (nausea). 30 tablet 5    sodium zirconium cyclosilicate (LOKELMA) 10 gram packet Take 1 packet (10 g total) by mouth every Tuesday, Thursday, Saturday, Sunday. Mix entire contents of packet(s) into drinking glass containing 3 tablespoons of water; stir well and drink immediately. Add water and repeat until no powder remains to receive entire dose. (Patient not taking: Reported on 8/12/2024) 16 packet 0    VELTASSA 8.4 gram PwPk Take 8.4 g by mouth once daily. (Patient not taking: Reported on 8/12/2024)       Scheduled Meds:   0.9% NaCl   Intravenous Once    amiodarone  100 mg Oral Daily    apixaban  2.5 mg Oral BID    atorvastatin  40 mg Oral Daily     "doxycycline  100 mg Oral BID    epoetin rosy-epbx  50 Units/kg Subcutaneous Every Mon, Wed, Fri    latanoprost  1 drop Both Eyes QHS    mupirocin   Nasal BID    sodium zirconium cyclosilicate  10 g Oral Every Tues, Thurs, Sat, Sun     Continuous Infusions:  PRN Meds:.  Current Facility-Administered Medications:     0.9% NaCl, , Intravenous, PRN    acetaminophen, 650 mg, Oral, Q8H PRN    acetaminophen, 650 mg, Oral, Q4H PRN    aluminum-magnesium hydroxide-simethicone, 30 mL, Oral, QID PRN    dextrose 50%, 12.5 g, Intravenous, PRN    dextrose 50%, 25 g, Intravenous, PRN    glucagon (human recombinant), 1 mg, Intramuscular, PRN    glucose, 16 g, Oral, PRN    glucose, 24 g, Oral, PRN    heparin (porcine), 5,000 Units, Intravenous, PRN    hydrALAZINE, 10 mg, Intravenous, Q6H PRN    melatonin, 6 mg, Oral, Nightly PRN    naloxone, 0.02 mg, Intravenous, PRN    ondansetron, 4 mg, Intravenous, Q6H PRN    sodium chloride 0.9%, 250 mL, Intravenous, PRN    Allergies:  Cyclobenzaprine, Fish containing products, Peanut, and Tramadol    Past Family History:  Reviewed; refer to Hospitalist Admission Note    Review of Systems:  Review of Systems - All 14 systems reviewed and negative, except as noted in HPI    Physical Exam:    BP (!) 182/82 Comment: after hydralazine  Pulse 79   Temp 97.8 °F (36.6 °C) (Oral)   Resp 17   Ht 5' 5" (1.651 m)   Wt 47.6 kg (104 lb 15 oz)   SpO2 96%   BMI 17.46 kg/m²     General Appearance:    Alert, cooperative, no distress, appears stated age   Head:    Normocephalic, without obvious abnormality, atraumatic   Eyes:    PER, conjunctiva/corneas clear, EOM's intact in both eyes        Throat:   Lips, mucosa, and tongue normal; teeth and gums normal   Back:     Symmetric, no curvature, ROM normal, no CVA tenderness   Lungs:     Clear to auscultation bilaterally, respirations unlabored   Chest wall:    No tenderness or deformity   Heart:    Regular rate and rhythm, S1 and S2 normal, no murmur, rub   " or gallop   Abdomen:     Soft, non-tender, bowel sounds active all four quadrants,     no masses, no organomegaly   Extremities:   Extremities normal, atraumatic, no cyanosis or edema   Pulses:   2+ and symmetric all extremities   MSK:   No joint or muscle swelling, tenderness or deformity   Skin:   Skin color, texture, turgor normal, no rashes or lesions   Neurologic:   CNII-XII intact, normal strength and sensation       Throughout.  No flap     Results:  Lab Results   Component Value Date     08/14/2024    K 4.8 08/14/2024    CL 96 08/14/2024    CO2 28 08/14/2024    BUN 46 (H) 08/14/2024    CREATININE 8.5 (H) 08/14/2024    CALCIUM 9.1 08/14/2024    ANIONGAP 15 08/14/2024    ESTGFRAFRICA 4.7 (A) 02/08/2022    EGFRNONAA 4.0 (A) 02/08/2022       Lab Results   Component Value Date    CALCIUM 9.1 08/14/2024    PHOS 3.0 08/13/2024       Recent Labs   Lab 08/14/24  0351   WBC 5.60   RBC 4.42   HGB 11.5*   HCT 38.6      MCV 87   MCH 26.0*   MCHC 29.8*        Patient care was time spent personally by me on the following activities:   Obtaining a history  Examination of patient.  Providing medical care at the patients bedside.  Developing a treatment plan with patient or surrogate and bedside caregivers  Ordering and reviewing laboratory studies, radiographic studies, pulse oximetry.  Ordering and performing treatments and interventions.  Evaluation of patient's response to treatment.  Discussions with consultants while on the unit and immediately available to the patient.  Re-evaluation of the patient's condition.  Documentation in the medical record.       Raleigh Yee MD  [facility]  [phone]

## 2024-08-14 NOTE — SUBJECTIVE & OBJECTIVE
Interval History:  Patient seen and examined.  NAD.  Had dialysis this morning.  Palliative care team to discuss further goals with family tomorrow.      Review of Systems   Reason unable to perform ROS: Confusion.     Objective:     Vital Signs (Most Recent):  Temp: 98.2 °F (36.8 °C) (08/14/24 1345)  Pulse: 91 (08/14/24 1345)  Resp: 18 (08/14/24 1345)  BP: (!) 151/70 (08/14/24 1345)  SpO2: 95 % (08/14/24 1345) Vital Signs (24h Range):  Temp:  [97.8 °F (36.6 °C)-98.7 °F (37.1 °C)] 98.2 °F (36.8 °C)  Pulse:  [] 91  Resp:  [17-20] 18  SpO2:  [95 %-97 %] 95 %  BP: ()/(49-86) 151/70     Weight: 47.6 kg (104 lb 15 oz)  Body mass index is 17.46 kg/m².    Intake/Output Summary (Last 24 hours) at 8/14/2024 1444  Last data filed at 8/14/2024 1220  Gross per 24 hour   Intake 0 ml   Output 2000 ml   Net -2000 ml         Physical Exam  Vitals and nursing note reviewed.   Constitutional:       General: She is not in acute distress.     Appearance: She is ill-appearing (Chronically).   HENT:      Head: Normocephalic.      Mouth/Throat:      Mouth: Mucous membranes are moist.      Pharynx: Oropharynx is clear.   Cardiovascular:      Rate and Rhythm: Normal rate and regular rhythm.      Pulses: Normal pulses.      Heart sounds: Normal heart sounds.   Pulmonary:      Effort: Pulmonary effort is normal.      Breath sounds: Normal breath sounds.   Abdominal:      General: Abdomen is flat. Bowel sounds are normal. There is no distension.      Palpations: Abdomen is soft.      Tenderness: There is no abdominal tenderness. There is no guarding.   Musculoskeletal:         General: Normal range of motion.   Skin:     General: Skin is warm and dry.      Capillary Refill: Capillary refill takes less than 2 seconds.   Neurological:      General: No focal deficit present.      Mental Status: She is alert.      Comments: Tremors and facial twitching noted             Significant Labs: All pertinent labs within the past 24 hours have  been reviewed.  CBC:   Recent Labs   Lab 08/13/24  0404 08/14/24  0351   WBC 4.51 5.60   HGB 10.6* 11.5*   HCT 36.7* 38.6    271     CMP:   Recent Labs   Lab 08/13/24  0404 08/14/24  0351    139   K 5.0 4.8   CL 97 96   CO2 31* 28   GLU 60* 77   BUN 30* 46*   CREATININE 6.7* 8.5*   CALCIUM 9.0 9.1   PROT 6.7 6.8   ALBUMIN 3.6 3.6   BILITOT 0.7 0.7   ALKPHOS 59 59   AST 24 23   ALT 16 17   ANIONGAP 12 15     Magnesium:   Recent Labs   Lab 08/13/24  0404 08/14/24  0351   MG 1.9 1.9     Troponin:   Recent Labs   Lab 08/12/24  1640   TROPONINIHS 177.0*       Significant Imaging:  I have reviewed all pertinent imaging results/findings within the past 24 hours.

## 2024-08-15 LAB
ALBUMIN SERPL BCP-MCNC: 3.8 G/DL (ref 3.5–5.2)
ALP SERPL-CCNC: 69 U/L (ref 55–135)
ALT SERPL W/O P-5'-P-CCNC: 17 U/L (ref 10–44)
ANION GAP SERPL CALC-SCNC: 13 MMOL/L (ref 8–16)
AST SERPL-CCNC: 23 U/L (ref 10–40)
BASOPHILS # BLD AUTO: 0.03 K/UL (ref 0–0.2)
BASOPHILS NFR BLD: 0.7 % (ref 0–1.9)
BILIRUB SERPL-MCNC: 0.8 MG/DL (ref 0.1–1)
BUN SERPL-MCNC: 36 MG/DL (ref 8–23)
CALCIUM SERPL-MCNC: 9.3 MG/DL (ref 8.7–10.5)
CHLORIDE SERPL-SCNC: 102 MMOL/L (ref 95–110)
CO2 SERPL-SCNC: 22 MMOL/L (ref 23–29)
CREAT SERPL-MCNC: 7 MG/DL (ref 0.5–1.4)
DIFFERENTIAL METHOD BLD: ABNORMAL
EOSINOPHIL # BLD AUTO: 0.4 K/UL (ref 0–0.5)
EOSINOPHIL NFR BLD: 10.2 % (ref 0–8)
ERYTHROCYTE [DISTWIDTH] IN BLOOD BY AUTOMATED COUNT: 21.2 % (ref 11.5–14.5)
EST. GFR  (NO RACE VARIABLE): 5.4 ML/MIN/1.73 M^2
GLUCOSE SERPL-MCNC: 114 MG/DL (ref 70–110)
GLUCOSE SERPL-MCNC: 50 MG/DL (ref 70–110)
GLUCOSE SERPL-MCNC: 98 MG/DL (ref 70–110)
HCT VFR BLD AUTO: 41.3 % (ref 37–48.5)
HGB BLD-MCNC: 11.8 G/DL (ref 12–16)
IMM GRANULOCYTES # BLD AUTO: 0.01 K/UL (ref 0–0.04)
IMM GRANULOCYTES NFR BLD AUTO: 0.2 % (ref 0–0.5)
LYMPHOCYTES # BLD AUTO: 0.6 K/UL (ref 1–4.8)
LYMPHOCYTES NFR BLD: 14.8 % (ref 18–48)
MAGNESIUM SERPL-MCNC: 2 MG/DL (ref 1.6–2.6)
MCH RBC QN AUTO: 25.3 PG (ref 27–31)
MCHC RBC AUTO-ENTMCNC: 28.6 G/DL (ref 32–36)
MCV RBC AUTO: 88 FL (ref 82–98)
MONOCYTES # BLD AUTO: 0.7 K/UL (ref 0.3–1)
MONOCYTES NFR BLD: 17.2 % (ref 4–15)
NEUTROPHILS # BLD AUTO: 2.4 K/UL (ref 1.8–7.7)
NEUTROPHILS NFR BLD: 56.9 % (ref 38–73)
NRBC BLD-RTO: 0 /100 WBC
PLATELET # BLD AUTO: 283 K/UL (ref 150–450)
PMV BLD AUTO: 10 FL (ref 9.2–12.9)
POTASSIUM SERPL-SCNC: 5.3 MMOL/L (ref 3.5–5.1)
PROT SERPL-MCNC: 7.7 G/DL (ref 6–8.4)
RBC # BLD AUTO: 4.67 M/UL (ref 4–5.4)
SODIUM SERPL-SCNC: 137 MMOL/L (ref 136–145)
WBC # BLD AUTO: 4.13 K/UL (ref 3.9–12.7)

## 2024-08-15 PROCEDURE — 36415 COLL VENOUS BLD VENIPUNCTURE: CPT | Performed by: NURSE PRACTITIONER

## 2024-08-15 PROCEDURE — 97535 SELF CARE MNGMENT TRAINING: CPT

## 2024-08-15 PROCEDURE — 63600175 PHARM REV CODE 636 W HCPCS: Performed by: NURSE PRACTITIONER

## 2024-08-15 PROCEDURE — 85025 COMPLETE CBC W/AUTO DIFF WBC: CPT | Performed by: NURSE PRACTITIONER

## 2024-08-15 PROCEDURE — 25000003 PHARM REV CODE 250: Performed by: NURSE PRACTITIONER

## 2024-08-15 PROCEDURE — 25000003 PHARM REV CODE 250: Performed by: INTERNAL MEDICINE

## 2024-08-15 PROCEDURE — 97530 THERAPEUTIC ACTIVITIES: CPT | Mod: CQ

## 2024-08-15 PROCEDURE — 21400001 HC TELEMETRY ROOM

## 2024-08-15 PROCEDURE — 97167 OT EVAL HIGH COMPLEX 60 MIN: CPT

## 2024-08-15 PROCEDURE — 80053 COMPREHEN METABOLIC PANEL: CPT | Performed by: NURSE PRACTITIONER

## 2024-08-15 PROCEDURE — 83735 ASSAY OF MAGNESIUM: CPT | Performed by: NURSE PRACTITIONER

## 2024-08-15 RX ADMIN — AMIODARONE HYDROCHLORIDE 100 MG: 100 TABLET ORAL at 08:08

## 2024-08-15 RX ADMIN — Medication 6 MG: at 09:08

## 2024-08-15 RX ADMIN — LATANOPROST 1 DROP: 50 SOLUTION OPHTHALMIC at 09:08

## 2024-08-15 RX ADMIN — APIXABAN 2.5 MG: 2.5 TABLET, FILM COATED ORAL at 09:08

## 2024-08-15 RX ADMIN — ATORVASTATIN CALCIUM 40 MG: 40 TABLET, FILM COATED ORAL at 09:08

## 2024-08-15 RX ADMIN — HYDRALAZINE HYDROCHLORIDE 10 MG: 20 INJECTION INTRAMUSCULAR; INTRAVENOUS at 12:08

## 2024-08-15 RX ADMIN — DOXYCYCLINE HYCLATE 100 MG: 100 CAPSULE ORAL at 08:08

## 2024-08-15 RX ADMIN — MUPIROCIN 1 G: 20 OINTMENT TOPICAL at 10:08

## 2024-08-15 RX ADMIN — MUPIROCIN 1 G: 20 OINTMENT TOPICAL at 09:08

## 2024-08-15 RX ADMIN — APIXABAN 2.5 MG: 2.5 TABLET, FILM COATED ORAL at 08:08

## 2024-08-15 RX ADMIN — DOXYCYCLINE HYCLATE 100 MG: 100 CAPSULE ORAL at 09:08

## 2024-08-15 RX ADMIN — SODIUM ZIRCONIUM CYCLOSILICATE 10 G: 10 POWDER, FOR SUSPENSION ORAL at 06:08

## 2024-08-15 NOTE — ASSESSMENT & PLAN NOTE
Chronic, controlled.  Latest blood pressure and vitals reviewed-     Temp:  [97.8 °F (36.6 °C)-99.8 °F (37.7 °C)]   Pulse:  [67-91]   Resp:  [17-18]   BP: (121-190)/(54-80)   SpO2:  [94 %-98 %] .   Home meds for hypertension were reviewed    While in the hospital, will manage blood pressure as follows; Continue home antihypertensive regimen    Will utilize p.r.n. blood pressure medication only if patient's blood pressure greater than 180/110 and she develops symptoms such as worsening chest pain or shortness of breath.

## 2024-08-15 NOTE — PT/OT/SLP EVAL
Occupational Therapy   Evaluation    Name: Tyra Isaac  MRN: [id number]  Admitting Diagnosis: Acute metabolic encephalopathy  Recent Surgery: * No surgery found *      Recommendations:     Discharge Recommendations: Low Intensity Therapy  Discharge Equipment Recommendations:  none  Barriers to discharge:  None    Assessment:     Tyra Isaac is a 84 y.o. female with a medical diagnosis of Acute metabolic encephalopathy.  She presents with significant impairment to mobility and ADL participation due to severe dyskinesia especially with voluntary movement. Patient sat EOB requiring Mod A. Patient exhibited fair sitting balance at EOB. Patient tremulous at rest and exacerbated with voluntary movement. Noted tremors in BUE when combing hair at EOB requiring SBA. Patient was unable to stand from EOB as patient's dyskinesia intensified with every attempt to stand. Patient stated her tremors worsens with anxiety as well. Patient limited with further participation due to fatigue from intense, involuntary muscular spasms.      Performance deficits affecting function: weakness, impaired endurance, impaired self care skills, impaired functional mobility, gait instability, impaired balance, decreased upper extremity function, decreased lower extremity function, decreased coordination, decreased safety awareness, abnormal tone.      Rehab Prognosis: Poor; patient would benefit from acute skilled OT services to address these deficits and reach maximum level of function.       Plan:     Patient to be seen 3 x/week to address the above listed problems via self-care/home management, therapeutic activities, therapeutic exercises  Plan of Care Expires: 09/05/24  Plan of Care Reviewed with: patient    Subjective     Chief Complaint: none  Patient/Family Comments/goals: none    Occupational Profile:  Living Environment: Patient lives with son and DIL.   Previous level of function: Patient required assistance with dressing,  bathing, and toileting. Patient stated she was ambulatory with rollator.   Equipment Used at Home: rollator, wheelchair, cane, straight  Assistance upon Discharge: Patient will receive assistance from family.     Pain/Comfort:  Pain Rating 1: 0/10  Pain Rating Post-Intervention 1: 0/10    Patients cultural, spiritual, Uatsdin conflicts given the current situation: no    Objective:     Communicated with: nurse Trinh prior to session.  Patient found HOB elevated with bed alarm, telemetry upon OT entry to room.    General Precautions: Standard, fall  Orthopedic Precautions: N/A  Braces: N/A  Respiratory Status: Room air    Occupational Performance:    Bed Mobility:    Patient completed Scooting/Bridging with maximal assistance  Patient completed Supine to Sit with moderate assistance  Patient completed Sit to Supine with maximal assistance    Functional Mobility/Transfers:  Attempted sit<>stand x 3. Patient was unable stand due to severe dyskinesia      Activities of Daily Living:  Grooming: stand by assistance with hair grooming while seated EOB  Lower Body Dressing: maximal assistance to don/doff socks seated EOB  Toileting: dependence with adult brief donned    Cognitive/Visual Perceptual:  Cognitive/Psychosocial Skills:     -       Oriented to: Person and Place   -       Follows Commands/attention:Follows two-step commands  -       Communication: difficult to understand at times; global tremors affects quality of speech  -       Safety awareness/insight to disability: impaired   -       Mood/Affect/Coping skills/emotional control: Cooperative  Visual/Perceptual:      -Functional     Physical Exam:  Postural examination/scapula alignment:    -       Rounded shoulders  -       Forward head  Upper Extremity Range of Motion:     -       Right Upper Extremity: WFL  -       Left Upper Extremity: WFL  Upper Extremity Strength:    -       Right Upper Extremity: WFL  -       Left Upper Extremity: WFL   Strength:     -       Right Upper Extremity: WFL  -       Left Upper Extremity: WFL  Fine Motor Coordination:    -       Impaired  Minimally impaired due to tremors  Gross motor coordination:   severe dyskinesia profoundly limits gross coordination    AMPAC 6 Click ADL:  AMPAC Total Score: 14    Treatment & Education:  Pt educated on role of OT/POC, importance of time EOB/OOB, safety with ADLs, importance of intermittent mobility, safe techniques for transfers/ambulation, discharge recommendations/options, and use of call light for assistance and fall prevention.       Patient left HOB elevated with all lines intact, call button in reach, and bed alarm on    GOALS:   Multidisciplinary Problems       Occupational Therapy Goals          Problem: Occupational Therapy    Goal Priority Disciplines Outcome Interventions   Occupational Therapy Goal     OT, PT/OT Progressing    Description: Goals to be met by: 9/5/2024     Patient will increase functional independence with ADLs by performing:    Feeding with Supervision.  Grooming while EOB with Supervision.  Sitting at edge of bed x20 minutes with Supervision.  Supine to sit with Stand-by Assistance.  Toilet transfer to bedside commode with Minimal Assistance.                         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    Osteopenia of multiple sites 3/9/2018    Based upon bone density measurements. Patient also has chronic kidney disease.    Stroke 11/2016         Past Surgical History:   Procedure Laterality Date     ANGIOGRAM, CORONARY, WITH LEFT HEART CATHETERIZATION N/A 2/7/2022    Procedure: Angiogram, Coronary, with Left Heart Cath;  Surgeon: Gino Leal MD;  Location: [facility] CATH/EP LAB;  Service: Cardiology;  Laterality: N/A;    CARDIAC SURGERY      stents    EYE SURGERY      WRIST SURGERY         Time Tracking:     OT Date of Treatment: 08/15/24  OT Start Time: 0906  OT Stop Time: 0934  OT Total Time (min): 28 min    Billable Minutes:Evaluation 5  Self Care/Home Management 23    8/15/2024

## 2024-08-15 NOTE — SUBJECTIVE & OBJECTIVE
Interval History: Pts mentation slightly improved today.     Medications:  Continuous Infusions:  Scheduled Meds:   0.9% NaCl   Intravenous Once    amiodarone  100 mg Oral Daily    apixaban  2.5 mg Oral BID    atorvastatin  40 mg Oral Daily    doxycycline  100 mg Oral BID    epoetin rosy-epbx  50 Units/kg Subcutaneous Every Mon, Wed, Fri    latanoprost  1 drop Both Eyes QHS    mupirocin   Nasal BID    sodium zirconium cyclosilicate  10 g Oral Every Tues, Thurs, Sat, Sun     PRN Meds:  Current Facility-Administered Medications:     0.9% NaCl, , Intravenous, PRN    acetaminophen, 650 mg, Oral, Q8H PRN    acetaminophen, 650 mg, Oral, Q4H PRN    aluminum-magnesium hydroxide-simethicone, 30 mL, Oral, QID PRN    dextrose 50%, 12.5 g, Intravenous, PRN    dextrose 50%, 25 g, Intravenous, PRN    glucagon (human recombinant), 1 mg, Intramuscular, PRN    glucose, 16 g, Oral, PRN    glucose, 24 g, Oral, PRN    heparin (porcine), 5,000 Units, Intravenous, PRN    hydrALAZINE, 10 mg, Intravenous, Q6H PRN    melatonin, 6 mg, Oral, Nightly PRN    naloxone, 0.02 mg, Intravenous, PRN    ondansetron, 4 mg, Intravenous, Q6H PRN    sodium chloride 0.9%, 250 mL, Intravenous, PRN    Objective:     Vital Signs (Most Recent):  Temp: 97.8 °F (36.6 °C) (08/15/24 0744)  Pulse: 75 (08/15/24 0744)  Resp: 17 (08/15/24 0744)  BP: (!) 190/79 (08/15/24 0744)  SpO2: (!) 94 % (08/15/24 0744) Vital Signs (24h Range):  Temp:  [97.8 °F (36.6 °C)-99.8 °F (37.7 °C)] 97.8 °F (36.6 °C)  Pulse:  [67-91] 75  Resp:  [17-18] 17  SpO2:  [94 %-98 %] 94 %  BP: ()/(49-79) 190/79     Weight: 47.6 kg (104 lb 15 oz)  Body mass index is 17.46 kg/m².       Physical Exam  Vitals and nursing note reviewed.   Constitutional:       General: She is not in acute distress.     Appearance: She is ill-appearing.      Comments: Drowsy, arouses to name called   Cardiovascular:      Rate and Rhythm: Normal rate and regular rhythm.      Pulses: Normal pulses.   Pulmonary:       Effort: Pulmonary effort is normal. No respiratory distress.   Skin:     General: Skin is warm and dry.   Neurological:      Mental Status: She is disoriented.            Review of Symptoms      Symptom Assessment (ESAS 0-10 Scale)  Unable to complete assessment due to Mental status change         Pain Assessment in Advanced Demential Scale (PAINAD)   Breathing - Independent of vocalization:  0  Negative vocalization:  0  Facial expression:  0  Body language:  0  Consolability:  0  Total:  0    Performance Status:  30    Living Arrangements:  Lives with family    Psychosocial/Cultural:   See Palliative Psychosocial Note: No  **Primary  to Follow**  Palliative Care  Consult: No        Advance Care Planning   Advance Directives:   Do Not Resuscitate Status: No      Decision Making:  Family answered questions  Goals of Care: What is most important right now is to focus on symptom/pain control, quality of life, even if it means sacrificing a little time, improvement in condition but with limits to invasive therapies. Accordingly, we have decided that the best plan to meet the patient's goals includes continuing with treatment.         Significant Labs: All pertinent labs within the past 24 hours have been reviewed.  CBC:   Recent Labs   Lab 08/15/24  0453   WBC 4.13   HGB 11.8*   HCT 41.3   MCV 88        BMP:  Recent Labs   Lab 08/15/24  0453   GLU 50*      K 5.3*      CO2 22*   BUN 36*   CREATININE 7.0*   CALCIUM 9.3   MG 2.0     LFT:  Lab Results   Component Value Date    AST 23 08/15/2024    ALKPHOS 69 08/15/2024    BILITOT 0.8 08/15/2024     Albumin:   Albumin   Date Value Ref Range Status   08/15/2024 3.8 3.5 - 5.2 g/dL Final     Protein:   Total Protein   Date Value Ref Range Status   08/15/2024 7.7 6.0 - 8.4 g/dL Final     Lactic acid:   Lab Results   Component Value Date    LACTATE 0.7 01/04/2024    LACTATE 2.2 (HH) 01/04/2024       Significant Imaging: I have  reviewed all pertinent imaging results/findings within the past 24 hours.

## 2024-08-15 NOTE — PLAN OF CARE
Problem: Adult Inpatient Plan of Care  Goal: Plan of Care Review  Outcome: Progressing  Goal: Patient-Specific Goal (Individualized)  Outcome: Progressing  Goal: Absence of Hospital-Acquired Illness or Injury  Outcome: Progressing  Goal: Optimal Comfort and Wellbeing  Outcome: Progressing  Goal: Readiness for Transition of Care  Outcome: Progressing     Problem: Coping Ineffective  Goal: Effective Coping  Outcome: Progressing     Problem: Fall Injury Risk  Goal: Absence of Fall and Fall-Related Injury  Outcome: Progressing     Problem: Skin Injury Risk Increased  Goal: Skin Health and Integrity  Outcome: Progressing     Problem: Wound  Goal: Optimal Coping  Outcome: Progressing  Goal: Optimal Functional Ability  Outcome: Progressing  Goal: Absence of Infection Signs and Symptoms  Outcome: Progressing  Goal: Improved Oral Intake  Outcome: Progressing  Goal: Optimal Pain Control and Function  Outcome: Progressing  Goal: Skin Health and Integrity  Outcome: Progressing  Goal: Optimal Wound Healing  Outcome: Progressing     Problem: Hemodialysis  Goal: Safe, Effective Therapy Delivery  Outcome: Progressing  Goal: Effective Tissue Perfusion  Outcome: Progressing  Goal: Absence of Infection Signs and Symptoms  Outcome: Progressing

## 2024-08-15 NOTE — PLAN OF CARE
Problem: Occupational Therapy  Goal: Occupational Therapy Goal  Description: Goals to be met by: 9/5/2024     Patient will increase functional independence with ADLs by performing:    Feeding with Supervision.  Grooming while EOB with Supervision.  Sitting at edge of bed x20 minutes with Supervision.  Supine to sit with Stand-by Assistance.  Toilet transfer to bedside commode with Minimal Assistance.    Outcome: Progressing

## 2024-08-15 NOTE — PROGRESS NOTES
[facility]  Palliative Medicine  Progress Note    Patient Name: Tyra Isaac  MRN: [id number]  Admission Date: 8/12/2024  Hospital Length of Stay: 2 days  Code Status: Full Code   Attending Provider: German Michael DO  Consulting Provider: Tamara Barker NP  Primary Care Physician: Kalpesh Goel MD  Principal Problem:Acute metabolic encephalopathy    Patient information was obtained from relative(s), past medical records, ER records, and primary team.      Assessment/Plan:     Palliative Care  Goals of care, counseling/discussion  I reviewed the patient's chart and discussed the case with the patient's team.      I examined Tyra Isaac at bedside.  The patient's mentation is slighty improved today but she conts to lacks capacity for complex medical decision-making.  I spoke with pts son Raffi via telephone.    Pt is able to converse more today but speech remains intermittently garbled. She states she is feeling better today. She remains weak and deconditioned.    I updated Raffi on my visit with his mother this morning. He was happy to hear her mentation was improving. He has spoken with his siblings and they want to give pt a few more days of medical treatment before deciding on hospice. He also states they are struggling with the fact that once they start hospice she can no longer get dialysis and this will ultimately shorten her life tremendeously. I affirmed his concern.     We spoke back and forth and I provided emotional support.     I recommended he cont to have conversations with his siblings and also his mother since her mentation is improving. If at any time they feel pt no longer wants to seek tx at hospital and is agreeable to stopping HD then hospice will be appropriate.     I rec they consider a hospice informational visit. Raffi agreed with this idea and will consider it.     I also rec that pt be made DNR. I let him know that given pts advanced disease process  and frail state I worry that CPR would cause more suffering. He understood and will discuss further with his siblings and consider my recommendation.     Pt and family goals are not currently aligned with hospice. At this time I do not have much more to offer. Son has my contact number and I let him know he can contact me anytime for further goals of care discussion.       I appreciate being involved in pts care. I will sign off. Please reach out if I can be of any assistance.                Subjective:     Chief Complaint:   Chief Complaint   Patient presents with    Generalized Weakness     Patient arrived to dialysis with generalized weakness - dialysis completed with 1.5L removed then EMS called for weakness - EMS talked to patient's son and stated that patient has been having generalized weakness and confusion for the past 3 weeks - hx of bradycardia and afib - pt's son states that BP has been difficult to control and has been trying different cardiac medications - currently on metoprolol.       HPI:   From admission HPI:  The patient is a 84 year old female with a PMHx of ESRD on HD (F - Dr. Mott), Atrial fibrillation (on Eliquis), HTN, CAD, COPD, previous CVA who presnted to the emergency department for the evaluation of generalized weakness x 3 weeks. Patient was recently evaluated in the emergency department here at [facility] 8/9/24 for the evaluation of shortness of breath with cough and generalized weakness. Chest xray at that time revealed right basilar ground-glass opacities, suspicious for viral/atypical pneumonia vs pulmonary edema. She was discharged home with doxycycline. Prior to that she was admitted here and discharged on 7/19/24 after a admission for bradycardia for which her amiodarone and metoprolol were discontinued per cardiology and she was placed on cardizem. However, upon follow up she was taken off her cardizem and placed back on amiodarone at 100 mg daily by her cardiologist Dr. Thompson.  Patient went to HD this am with 1.5L off and patient reported generalized weakness following treatment. EMS was activated and spoke to patient's son who reports that she was been experiencing weakness and confusion over the past three weeks. She has also been having difficulty controlling her blood pressure and has had some medication changes as of recent.      Patient evaluated in the emergency department - COVID and flu negative. Electrolytes stable. BNP 3400, high sensitivity troponin 226, EKG with normal sinus rhythm, 71 beats per minute with incomplete right bundle-branch block. Septal infarct age undetermined with T-w    Palliative medicine consult:  Pt admitted for AMS, generalized weakness and pneumonia. Palliative medicine consulted for goals of care discussion.        Hospital Course:  No notes on file    Interval History: Pts mentation slightly improved today.     Medications:  Continuous Infusions:  Scheduled Meds:   0.9% NaCl   Intravenous Once    amiodarone  100 mg Oral Daily    apixaban  2.5 mg Oral BID    atorvastatin  40 mg Oral Daily    doxycycline  100 mg Oral BID    epoetin rosy-epbx  50 Units/kg Subcutaneous Every Mon, Wed, Fri    latanoprost  1 drop Both Eyes QHS    mupirocin   Nasal BID    sodium zirconium cyclosilicate  10 g Oral Every Tues, Thurs, Sat, Sun     PRN Meds:  Current Facility-Administered Medications:     0.9% NaCl, , Intravenous, PRN    acetaminophen, 650 mg, Oral, Q8H PRN    acetaminophen, 650 mg, Oral, Q4H PRN    aluminum-magnesium hydroxide-simethicone, 30 mL, Oral, QID PRN    dextrose 50%, 12.5 g, Intravenous, PRN    dextrose 50%, 25 g, Intravenous, PRN    glucagon (human recombinant), 1 mg, Intramuscular, PRN    glucose, 16 g, Oral, PRN    glucose, 24 g, Oral, PRN    heparin (porcine), 5,000 Units, Intravenous, PRN    hydrALAZINE, 10 mg, Intravenous, Q6H PRN    melatonin, 6 mg, Oral, Nightly PRN    naloxone, 0.02 mg, Intravenous, PRN    ondansetron, 4 mg, Intravenous, Q6H  PRN    sodium chloride 0.9%, 250 mL, Intravenous, PRN    Objective:     Vital Signs (Most Recent):  Temp: 97.8 °F (36.6 °C) (08/15/24 0744)  Pulse: 75 (08/15/24 0744)  Resp: 17 (08/15/24 0744)  BP: (!) 190/79 (08/15/24 0744)  SpO2: (!) 94 % (08/15/24 0744) Vital Signs (24h Range):  Temp:  [97.8 °F (36.6 °C)-99.8 °F (37.7 °C)] 97.8 °F (36.6 °C)  Pulse:  [67-91] 75  Resp:  [17-18] 17  SpO2:  [94 %-98 %] 94 %  BP: ()/(49-79) 190/79     Weight: 47.6 kg (104 lb 15 oz)  Body mass index is 17.46 kg/m².       Physical Exam  Vitals and nursing note reviewed.   Constitutional:       General: She is not in acute distress.     Appearance: She is ill-appearing.      Comments: Drowsy, arouses to name called   Cardiovascular:      Rate and Rhythm: Normal rate and regular rhythm.      Pulses: Normal pulses.   Pulmonary:      Effort: Pulmonary effort is normal. No respiratory distress.   Skin:     General: Skin is warm and dry.   Neurological:      Mental Status: She is disoriented.            Review of Symptoms      Symptom Assessment (ESAS 0-10 Scale)  Unable to complete assessment due to Mental status change         Pain Assessment in Advanced Demential Scale (PAINAD)   Breathing - Independent of vocalization:  0  Negative vocalization:  0  Facial expression:  0  Body language:  0  Consolability:  0  Total:  0    Performance Status:  30    Living Arrangements:  Lives with family    Psychosocial/Cultural:   See Palliative Psychosocial Note: No  **Primary  to Follow**  Palliative Care  Consult: No        Advance Care Planning  Advance Directives:   Do Not Resuscitate Status: No      Decision Making:  Family answered questions  Goals of Care: What is most important right now is to focus on symptom/pain control, quality of life, even if it means sacrificing a little time, improvement in condition but with limits to invasive therapies. Accordingly, we have decided that the best plan to meet the  patient's goals includes continuing with treatment.         Significant Labs: All pertinent labs within the past 24 hours have been reviewed.  CBC:   Recent Labs   Lab 08/15/24  0453   WBC 4.13   HGB 11.8*   HCT 41.3   MCV 88        BMP:  Recent Labs   Lab 08/15/24  0453   GLU 50*      K 5.3*      CO2 22*   BUN 36*   CREATININE 7.0*   CALCIUM 9.3   MG 2.0     LFT:  Lab Results   Component Value Date    AST 23 08/15/2024    ALKPHOS 69 08/15/2024    BILITOT 0.8 08/15/2024     Albumin:   Albumin   Date Value Ref Range Status   08/15/2024 3.8 3.5 - 5.2 g/dL Final     Protein:   Total Protein   Date Value Ref Range Status   08/15/2024 7.7 6.0 - 8.4 g/dL Final     Lactic acid:   Lab Results   Component Value Date    LACTATE 0.7 01/04/2024    LACTATE 2.2 (HH) 01/04/2024       Significant Imaging: I have reviewed all pertinent imaging results/findings within the past 24 hours.    65 min visit spent in chart review, face to face discussion of goals of care,  symptom assessment, coordination of care and emotional support.     Tamara Barker NP  Palliative Medicine  [facility]

## 2024-08-15 NOTE — ASSESSMENT & PLAN NOTE
Patient presents with generalized confusion with three week history of confusion  Recently treated for possible pneumonia, continue doxy   Recurrent episodes of confusion, frequent hospitalization, chronic renal disease and debility - palliative care team following  UA negative for infection  CT brain without contrast without acute findings - previous CVA noted stable from prior exam, age related changes  D/C Reglan as this could be affecting patient's cognition and causing tremors   PT/OT ordered for evaluation   Supportive care

## 2024-08-15 NOTE — ASSESSMENT & PLAN NOTE
Palliative care team following  Son would like to discuss with other family members regarding comfort care  Patient to remain full code and full treatment at this time

## 2024-08-15 NOTE — ASSESSMENT & PLAN NOTE
I reviewed the patient's chart and discussed the case with the patient's team.      I examined Tyra Isaac at bedside.  The patient's mentation is slighty improved today but she conts to lacks capacity for complex medical decision-making.  I spoke with pts son Raffi via telephone.    Pt is able to converse more today but speech remains intermittently garbled. She states she is feeling better today. She remains weak and deconditioned.    I updated Raffi on my visit with his mother this morning. He was happy to hear her mentation was improving. He has spoken with his siblings and they want to give pt a few more days of medical treatment before deciding on hospice. He also states they are struggling with the fact that once they start hospice she can no longer get dialysis and this will ultimately shorten her life tremendeously. I affirmed his concern.     We spoke back and forth and I provided emotional support.     I recommended he cont to have conversations with his siblings and also his mother since her mentation is improving. If at any time they feel pt no longer wants to seek tx at hospital and is agreeable to stopping HD then hospice will be appropriate.     I rec they consider a hospice informational visit. Raffi agreed with this idea and will consider it.     I also rec that pt be made DNR. I let him know that given pts advanced disease process and frail state I worry that CPR would cause more suffering. He understood and will discuss further with his siblings and consider my recommendation.     Pt and family goals are not currently aligned with hospice. At this time I do not have much more to offer. Son has my contact number and I let him know he can contact me anytime for further goals of care discussion.       I appreciate being involved in pts care. I will sign off. Please reach out if I can be of any assistance.

## 2024-08-15 NOTE — PROGRESS NOTES
INPATIENT NEPHROLOGY CONSULT   [facility] NEPHROLOGY    Tyra Isaac  08/15/2024    Reason for consultation:  esrd    Chief Complaint:   Chief Complaint   Patient presents with    Generalized Weakness     Patient arrived to dialysis with generalized weakness - dialysis completed with 1.5L removed then EMS called for weakness - EMS talked to patient's son and stated that patient has been having generalized weakness and confusion for the past 3 weeks - hx of bradycardia and afib - pt's son states that BP has been difficult to control and has been trying different cardiac medications - currently on metoprolol.     History of Present Illness:     Per H and P  The patient is a 84 year old female with a PMHx of ESRD on HD ([facility] - Dr. Mott), Atrial fibrillation (on Eliquis), HTN, CAD, COPD, previous CVA who presnted to the emergency department for the evaluation of generalized weakness x 3 weeks. Patient was recently evaluated in the emergency department here at [facility] 8/9/24 for the evaluation of shortness of breath with cough and generalized weakness. Chest xray at that time revealed right basilar ground-glass opacities, suspicious for viral/atypical pneumonia vs pulmonary edema. She was discharged home with doxycycline. Prior to that she was admitted here and discharged on 7/19/24 after a admission for bradycardia for which her amiodarone and metoprolol were discontinued per cardiology and she was placed on cardizem. However, upon follow up she was taken off her cardizem and placed back on amiodarone at 100 mg daily by her cardiologist Dr. Thompson. Patient went to HD this am with 1.5L off and patient reported generalized weakness following treatment. EMS was activated and spoke to patient's son who reports that she was been experiencing weakness and confusion over the past three weeks. She has also been having difficulty controlling her blood pressure and has had some medication changes as of recent.      Patient  evaluated in the emergency department - COVID and flu negative. Electrolytes stable. BNP 3400, high sensitivity troponin 226, EKG with normal sinus rhythm, 71 beats per minute with incomplete right bundle-branch block. Septal infarct age undetermined with T-wave abnormality in the inferior and anterior leads. Patient admitted to hospital medicine service for further evaluation    8/14 VSS. Seen on HD today.  8/15 VSS. HD tomorrow. Low BG, needs better DM control.    Plan of Care:    esrd  --continue dialysis per routine  --fluid restrict  --renal dose medication per routine  --continue outpt medication  --continue binders with meals    Anemia of CKD  --erythropoiesis stimulating agent with renal replacement therapy    Secondary HPT  --renal diet  --continue binders    Hypertension  --uf with hd  --fluid restrict  --low salt diet  --continue home medication    Thank you for allowing us to participate in this patient's care. We will continue to follow.    Vital Signs:  Temp Readings from Last 3 Encounters:   08/15/24 97.8 °F (36.6 °C) (Oral)   08/09/24 98.2 °F (36.8 °C) (Oral)   07/30/24 98.6 °F (37 °C)       Pulse Readings from Last 3 Encounters:   08/15/24 75   08/09/24 68   07/30/24 (P) 65       BP Readings from Last 3 Encounters:   08/15/24 (!) 190/79   08/09/24 (!) 144/68   07/30/24 (!) 118/52       Weight:  Wt Readings from Last 3 Encounters:   08/13/24 47.6 kg (104 lb 15 oz)   08/09/24 46.3 kg (102 lb)   07/30/24 44.9 kg (99 lb)       Past Medical & Surgical History:  Past Medical History:   Diagnosis Date    A-fib     Anxiety     Depression     Disorder of kidney and ureter     Encephalopathy acute 1/1/2018    End stage kidney disease 6/17/2017    Gout     Hyperlipidemia     Hypertension     Moderate episode of recurrent major depressive disorder 1/17/2018    Nephropathy hypertensive, stage 5 chronic kidney disease or end stage renal disease 6/17/2017    Obstructive pattern present on pulmonary function  testing 7/28/2021    Shows moderate obstruction.    Osteopenia of multiple sites 3/9/2018    Based upon bone density measurements. Patient also has chronic kidney disease.    Stroke 11/2016       Past Surgical History:   Procedure Laterality Date    ANGIOGRAM, CORONARY, WITH LEFT HEART CATHETERIZATION N/A 2/7/2022    Procedure: Angiogram, Coronary, with Left Heart Cath;  Surgeon: Gino Leal MD;  Location: [facility] CATH/EP LAB;  Service: Cardiology;  Laterality: N/A;    CARDIAC SURGERY      stents    EYE SURGERY      WRIST SURGERY         Past Social History:  Social History     Socioeconomic History    Marital status:      Spouse name: Aria Isaac    Number of children: 3   Occupational History    Occupation: Not working   Tobacco Use    Smoking status: Never    Smokeless tobacco: Never   Substance and Sexual Activity    Alcohol use: No    Drug use: No    Sexual activity: Not Currently     Social Determinants of Health     Financial Resource Strain: Low Risk  (8/13/2024)    Overall Financial Resource Strain (CARDIA)     Difficulty of Paying Living Expenses: Not hard at all   Recent Concern: Financial Resource Strain - Medium Risk (7/11/2024)    Overall Financial Resource Strain (CARDIA)     Difficulty of Paying Living Expenses: Somewhat hard   Food Insecurity: No Food Insecurity (8/13/2024)    Hunger Vital Sign     Worried About Running Out of Food in the Last Year: Never true     Ran Out of Food in the Last Year: Never true   Transportation Needs: No Transportation Needs (8/13/2024)    TRANSPORTATION NEEDS     Transportation : No   Physical Activity: Inactive (8/13/2024)    Exercise Vital Sign     Days of Exercise per Week: 0 days     Minutes of Exercise per Session: 0 min   Stress: Patient Unable To Answer (8/13/2024)    [place] [employer] - Occupational Stress Questionnaire     Feeling of Stress : Patient unable to answer   Recent Concern: Stress - Stress Concern Present  (7/11/2024)    [place] [employer] - Occupational Stress Questionnaire     Feeling of Stress : To some extent   Housing Stability: Low Risk  (8/13/2024)    Housing Stability Vital Sign     Unable to Pay for Housing in the Last Year: No     Homeless in the Last Year: No       Medications:  No current facility-administered medications on file prior to encounter.     Current Outpatient Medications on File Prior to Encounter   Medication Sig Dispense Refill    amiodarone (PACERONE) 100 MG Tab Take 1 tablet (100 mg total) by mouth once daily. 30 tablet 0    atorvastatin (LIPITOR) 40 MG tablet Take 40 mg by mouth once daily.      b complex vitamins tablet Take 1 tablet by mouth once daily.      doxycycline (VIBRAMYCIN) 100 MG Cap Take 1 capsule (100 mg total) by mouth 2 (two) times daily. for 10 days 20 capsule 0    ELIQUIS 2.5 mg Tab Take 2.5 mg by mouth 2 (two) times daily.      latanoprost 0.005 % ophthalmic solution Place 1 drop into both eyes every evening.      metoclopramide HCl (REGLAN) 5 MG tablet Take 1 tablet (5 mg total) by mouth 3 (three) times daily before meals. 30 tablet 1    midodrine (PROAMATINE) 10 MG tablet Take 1 tablet (10 mg total) by mouth 3 (three) times daily with meals. 270 tablet 0    ondansetron (ZOFRAN-ODT) 4 MG TbDL Take 1 tablet (4 mg total) by mouth every 6 (six) hours as needed (nausea). 30 tablet 5    sodium zirconium cyclosilicate (LOKELMA) 10 gram packet Take 1 packet (10 g total) by mouth every Tuesday, Thursday, Saturday, Sunday. Mix entire contents of packet(s) into drinking glass containing 3 tablespoons of water; stir well and drink immediately. Add water and repeat until no powder remains to receive entire dose. (Patient not taking: Reported on 8/12/2024) 16 packet 0    VELTASSA 8.4 gram PwPk Take 8.4 g by mouth once daily. (Patient not taking: Reported on 8/12/2024)       Scheduled Meds:   0.9% NaCl   Intravenous Once    amiodarone  100 mg Oral Daily     "apixaban  2.5 mg Oral BID    atorvastatin  40 mg Oral Daily    doxycycline  100 mg Oral BID    epoetin rosy-epbx  50 Units/kg Subcutaneous Every Mon, Wed, Fri    latanoprost  1 drop Both Eyes QHS    mupirocin   Nasal BID    sodium zirconium cyclosilicate  10 g Oral Every Tues, Thurs, Sat, Sun     Continuous Infusions:  PRN Meds:.  Current Facility-Administered Medications:     0.9% NaCl, , Intravenous, PRN    acetaminophen, 650 mg, Oral, Q8H PRN    acetaminophen, 650 mg, Oral, Q4H PRN    aluminum-magnesium hydroxide-simethicone, 30 mL, Oral, QID PRN    dextrose 50%, 12.5 g, Intravenous, PRN    dextrose 50%, 25 g, Intravenous, PRN    glucagon (human recombinant), 1 mg, Intramuscular, PRN    glucose, 16 g, Oral, PRN    glucose, 24 g, Oral, PRN    heparin (porcine), 5,000 Units, Intravenous, PRN    hydrALAZINE, 10 mg, Intravenous, Q6H PRN    melatonin, 6 mg, Oral, Nightly PRN    naloxone, 0.02 mg, Intravenous, PRN    ondansetron, 4 mg, Intravenous, Q6H PRN    sodium chloride 0.9%, 250 mL, Intravenous, PRN    Allergies:  Cyclobenzaprine, Fish containing products, Peanut, and Tramadol    Past Family History:  Reviewed; refer to Hospitalist Admission Note    Review of Systems:  Review of Systems - All 14 systems reviewed and negative, except as noted in HPI    Physical Exam:    BP (!) 190/79 (BP Location: Left arm, Patient Position: Lying)   Pulse 75   Temp 97.8 °F (36.6 °C) (Oral)   Resp 17   Ht 5' 5" (1.651 m)   Wt 47.6 kg (104 lb 15 oz)   SpO2 (!) 94%   BMI 17.46 kg/m²     General Appearance:    Alert, cooperative, no distress, appears stated age   Head:    Normocephalic, without obvious abnormality, atraumatic   Eyes:    PER, conjunctiva/corneas clear, EOM's intact in both eyes        Throat:   Lips, mucosa, and tongue normal; teeth and gums normal   Back:     Symmetric, no curvature, ROM normal, no CVA tenderness   Lungs:     Clear to auscultation bilaterally, respirations unlabored   Chest wall:    No " tenderness or deformity   Heart:    Regular rate and rhythm, S1 and S2 normal, no murmur, rub   or gallop   Abdomen:     Soft, non-tender, bowel sounds active all four quadrants,     no masses, no organomegaly   Extremities:   Extremities normal, atraumatic, no cyanosis or edema   Pulses:   2+ and symmetric all extremities   MSK:   No joint or muscle swelling, tenderness or deformity   Skin:   Skin color, texture, turgor normal, no rashes or lesions   Neurologic:   CNII-XII intact, normal strength and sensation       Throughout.  No flap     Results:  Lab Results   Component Value Date     08/15/2024    K 5.3 (H) 08/15/2024     08/15/2024    CO2 22 (L) 08/15/2024    BUN 36 (H) 08/15/2024    CREATININE 7.0 (H) 08/15/2024    CALCIUM 9.3 08/15/2024    ANIONGAP 13 08/15/2024    ESTGFRAFRICA 4.7 (A) 02/08/2022    EGFRNONAA 4.0 (A) 02/08/2022       Lab Results   Component Value Date    CALCIUM 9.3 08/15/2024    PHOS 3.0 08/13/2024       Recent Labs   Lab 08/15/24  0453   WBC 4.13   RBC 4.67   HGB 11.8*   HCT 41.3      MCV 88   MCH 25.3*   MCHC 28.6*        Patient care was time spent personally by me on the following activities:   Obtaining a history  Examination of patient.  Providing medical care at the patients bedside.  Developing a treatment plan with patient or surrogate and bedside caregivers  Ordering and reviewing laboratory studies, radiographic studies, pulse oximetry.  Ordering and performing treatments and interventions.  Evaluation of patient's response to treatment.  Discussions with consultants while on the unit and immediately available to the patient.  Re-evaluation of the patient's condition.  Documentation in the medical record.       Raleigh Yee MD  [facility] [place]  [phone]

## 2024-08-15 NOTE — PROGRESS NOTES
[facility] Medicine  Progress Note    Patient Name: Tyra Isaac  MRN: [id number]  Patient Class: IP- Inpatient   Admission Date: 8/12/2024  Length of Stay: 2 days  Attending Physician: German Michael DO  Primary Care Provider: Kalpesh Goel MD        Subjective:     Principal Problem:Acute metabolic encephalopathy        HPI:  The patient is a 84 year old female with a PMHx of ESRD on HD ([facility] - Dr. Mott), Atrial fibrillation (on Eliquis), HTN, CAD, COPD, previous CVA who presnted to the emergency department for the evaluation of generalized weakness x 3 weeks. Patient was recently evaluated in the emergency department here at [facility] 8/9/24 for the evaluation of shortness of breath with cough and generalized weakness. Chest xray at that time revealed right basilar ground-glass opacities, suspicious for viral/atypical pneumonia vs pulmonary edema. She was discharged home with doxycycline. Prior to that she was admitted here and discharged on 7/19/24 after a admission for bradycardia for which her amiodarone and metoprolol were discontinued per cardiology and she was placed on cardizem. However, upon follow up she was taken off her cardizem and placed back on amiodarone at 100 mg daily by her cardiologist Dr. Thompson. Patient went to HD this am with 1.5L off and patient reported generalized weakness following treatment. EMS was activated and spoke to patient's son who reports that she was been experiencing weakness and confusion over the past three weeks. She has also been having difficulty controlling her blood pressure and has had some medication changes as of recent.     Patient evaluated in the emergency department - COVID and flu negative. Electrolytes stable. BNP 3400, high sensitivity troponin 226, EKG with normal sinus rhythm, 71 beats per minute with incomplete right bundle-branch block. Septal infarct age undetermined with T-wave abnormality in the inferior and anterior leads.  Patient admitted to hospital medicine service for further evaluation.           Overview/Hospital Course:  84-year-old female admitted with confusion, weakness, tremors.  Patient has remained hemodynamically stable.  CBC with mild anemia secondary for ESRD, CMP with stable electrolytes and consistent with ESRD.  Tropnin trended down from 226 on admit to 170 likely 2/2 demand ischemia from fluid overload/ESRD.  CT head without contrast with no interval changes from previous MRI in 11/2022, chronic age-related changes, small foci of lacunar infarction that is stable from previous imaging.  Per further chart review, patient was prescribed Reglan for possible gastroparesis in July 2024.  Reglan was discontinued as it may be contributing to her tremors as well as facial movements and confusion.  Maintained on HD schedule per nephrology.  PT/OT consulted.  Palliative care team has been consulted and is following patient, son is to have further discussion with more family members regarding comfort care.  He would like to proceed with full code and care as of now.  Blood glucose 50 this morning, improved to the 90s after apple juice given.  Will monitor accuchecks Q 6hrs to ensure stability.    Interval History:  Patient seen and examined.  NAD.  Patient more awake and alert today, continues with some confusion and tremors.  Discussed with palliative care team who discussed with son.  Plan is for son to discuss comfort care further with the other family members.  He would like to continue with full code and full treatment as of now.  Blood glucose in the 50s on a.m. labs this morning, improved to the 90s with apple juice.  Continue to monitor blood glucose every 6 hours to ensure stability.      Review of Systems   Reason unable to perform ROS: Confusion.     Objective:     Vital Signs (Most Recent):  Temp: 97.8 °F (36.6 °C) (08/15/24 1107)  Pulse: 70 (08/15/24 1107)  Resp: 17 (08/15/24 1107)  BP: (!) 180/80 (08/15/24  1107)  SpO2: 98 % (08/15/24 1107) Vital Signs (24h Range):  Temp:  [97.8 °F (36.6 °C)-99.8 °F (37.7 °C)] 97.8 °F (36.6 °C)  Pulse:  [67-91] 70  Resp:  [17-18] 17  SpO2:  [94 %-98 %] 98 %  BP: (121-190)/(54-80) 180/80     Weight: 47.6 kg (104 lb 15 oz)  Body mass index is 17.46 kg/m².    Intake/Output Summary (Last 24 hours) at 8/15/2024 1139  Last data filed at 8/14/2024 1220  Gross per 24 hour   Intake 0 ml   Output 2000 ml   Net -2000 ml         Physical Exam  Vitals and nursing note reviewed.   Constitutional:       General: She is not in acute distress.     Appearance: She is ill-appearing (Chronically).   HENT:      Head: Normocephalic.      Mouth/Throat:      Mouth: Mucous membranes are moist.      Pharynx: Oropharynx is clear.   Cardiovascular:      Rate and Rhythm: Normal rate and regular rhythm.      Pulses: Normal pulses.      Heart sounds: Normal heart sounds.   Pulmonary:      Effort: Pulmonary effort is normal.      Breath sounds: Normal breath sounds.   Abdominal:      General: Abdomen is flat. Bowel sounds are normal. There is no distension.      Palpations: Abdomen is soft.      Tenderness: There is no abdominal tenderness. There is no guarding.   Musculoskeletal:         General: Normal range of motion.   Skin:     General: Skin is warm and dry.      Capillary Refill: Capillary refill takes less than 2 seconds.   Neurological:      General: No focal deficit present.      Mental Status: She is alert.      Comments: Tremors and facial twitching noted             Significant Labs: All pertinent labs within the past 24 hours have been reviewed.  CBC:   Recent Labs   Lab 08/14/24  0351 08/15/24  0453   WBC 5.60 4.13   HGB 11.5* 11.8*   HCT 38.6 41.3    283     CMP:   Recent Labs   Lab 08/14/24  0351 08/15/24  0453    137   K 4.8 5.3*   CL 96 102   CO2 28 22*   GLU 77 50*   BUN 46* 36*   CREATININE 8.5* 7.0*   CALCIUM 9.1 9.3   PROT 6.8 7.7   ALBUMIN 3.6 3.8   BILITOT 0.7 0.8   ALKPHOS 59 69  "  AST 23 23   ALT 17 17   ANIONGAP 15 13     Magnesium:   Recent Labs   Lab 08/14/24  0351 08/15/24  0453   MG 1.9 2.0     Troponin:   No results for input(s): "TROPONINI", "TROPONINIHS" in the last 48 hours.      Significant Imaging:  I have reviewed all pertinent imaging results/findings within the past 24 hours.     Assessment/Plan:      * Acute metabolic encephalopathy  Patient presents with generalized confusion with three week history of confusion  Recently treated for possible pneumonia, continue doxy   Recurrent episodes of confusion, frequent hospitalization, chronic renal disease and debility - palliative care team following  UA negative for infection  CT brain without contrast without acute findings - previous CVA noted stable from prior exam, age related changes  D/C Reglan as this could be affecting patient's cognition and causing tremors   PT/OT ordered for evaluation   Supportive care     Goals of care, counseling/discussion  Palliative care team following  Son would like to discuss with other family members regarding comfort care  Patient to remain full code and full treatment at this time         ESRD (end stage renal disease)  Patient ESRD HD MWF  Last HD Wednesday 2L off  Daily BMP  Renal diet  Renal dose medication  Nephrology consulted    Demand ischemia  Initial troponin 226  EKG normal sinus rhythm, 71 beats per minute with incomplete right bundle-branch block. Septal infarct age undetermined with T-wave abnormality in the inferior and anterior leads.   Patient with history of ESRD - last troponin from 8/9/24 34   Last echo from 6/28/24 with EF 64%, mild AS, mod MR, no WMA   Nuclear stress 6/28/24 without reversible ischemia   Cardiology consulted   Troponin trending down from 226 to 177   Continue telemetry monitoring     Atrial fibrillation  Patient currently in NSR  Continue amiodarone 100 mg daily  Continue Eliquis 2.5 mg bid   Continue telemetry monitoring   Cardiology consulted "     Benign hypertension with CKD (chronic kidney disease) stage V  Chronic, controlled.  Latest blood pressure and vitals reviewed-     Temp:  [97.8 °F (36.6 °C)-99.8 °F (37.7 °C)]   Pulse:  [67-91]   Resp:  [17-18]   BP: (121-190)/(54-80)   SpO2:  [94 %-98 %] .   Home meds for hypertension were reviewed    While in the hospital, will manage blood pressure as follows; Continue home antihypertensive regimen    Will utilize p.r.n. blood pressure medication only if patient's blood pressure greater than 180/110 and she develops symptoms such as worsening chest pain or shortness of breath.        VTE Risk Mitigation (From admission, onward)           Ordered     heparin (porcine) injection 5,000 Units  As needed (PRN)         08/14/24 0810     apixaban tablet 2.5 mg  2 times daily         08/12/24 1613     IP VTE HIGH RISK PATIENT  Once         08/12/24 1536     Place sequential compression device  Until discontinued         08/12/24 1536                    Discharge Planning   ILAN: 8/15/2024     Code Status: Full Code   Is the patient medically ready for discharge?:     Reason for patient still in hospital (select all that apply): Patient trending condition, Treatment, Consult recommendations, and Pending disposition  Discharge Plan A: Home Health                  KELBY Wheatley  Department of Hospital Medicine   [facility]

## 2024-08-15 NOTE — PT/OT/SLP PROGRESS
Physical Therapy Treatment    Patient Name:  Tyra Isaac   MRN:  [id number]    Recommendations:     Discharge Recommendations: Low Intensity Therapy  Discharge Equipment Recommendations:    Barriers to discharge:  tremulous, high fall risk, increased burden of care, balance deficits, increased assist with mobility, decreased activity tolerance    Assessment:     Tyra Isaac is a 84 y.o. female admitted with a medical diagnosis of Acute metabolic encephalopathy.  She presents with the following impairments/functional limitations: weakness, impaired balance, impaired cardiopulmonary response to activity, impaired coordination, gait instability, impaired endurance, decreased lower extremity function, decreased upper extremity function, abnormal tone.    Pt sleeping but easily roused by calling name. Pt agreeable to visit. Pt performed supine to sit transfer with min assist. Pt tremulous during transfer. Tremors subsided once pt was sitting EOB.    Pt mod assist x 1-2 to scoot hips forward for feet to touch the ground, pt became anxious about falling and began trembling and flailing arms. Pt required extra time sitting EOB for therapist to reassure pt and calm her down.    Pt performed sit to stand transfer with mod assist x 2 and no AD. Step transfer from bed to chair with mod to max assist x 2 and no AD. Pt extremely tremulous during transfer secondary to anxiety.    Pt left up in chair with daughter present. Tremors subsided once pt was up in chair.      Rehab Prognosis: Fair; patient would benefit from acute skilled PT services to address these deficits and reach maximum level of function.    Recent Surgery: * No surgery found *      Plan:     During this hospitalization, patient to be seen 5 x/week to address the identified rehab impairments via gait training, therapeutic activities, therapeutic exercises and progress toward the following goals:    Plan of Care Expires:       Subjective     Chief  Complaint: fearful of falling  Patient/Family Comments/goals: to get better  Pain/Comfort:  Pain Rating Post-Intervention 1: 0/10      Objective:     Communicated with nurse prior to session.  Patient found HOB elevated with telemetry, bed alarm upon PT entry to room.     General Precautions: Standard,    Orthopedic Precautions: N/A  Braces: N/A  Respiratory Status: Room air     Functional Mobility:  Bed Mobility:     Supine to Sit: minimum assistance  Transfers:     Sit to Stand:  moderate assistance and of 2 persons with no AD  Bed to Chair: moderate assistance, maximal assistance, and of 2 persons with  no AD  using  Step Transfer      AM-PAC 6 CLICK MOBILITY          Treatment & Education:  Pt educated on importance of time OOB, importance of intermittent mobility, safe techniques for transfers/ambulation, discharge recommendations/options, and use of call light for assistance and fall prevention.      Patient left up in chair with all lines intact, call button in reach, chair alarm on, nurse notified, and daughter present..    GOALS:   Multidisciplinary Problems       Physical Therapy Goals          Problem: Physical Therapy    Goal Priority Disciplines Outcome Goal Variances Interventions   Physical Therapy Goal     PT, PT/OT Progressing     Description: Goals to be met by: 24     Patient will increase functional independence with mobility by performin. Supine to sit with Stand-by Assistance  2. Sit to supine with MInimal Assistance  3. Sit to stand transfer with Moderate Assistance  4. Bed to chair transfer with Moderate Assistance using Rolling Walker  5. Gait  x 40 feet with Moderate Assistance using Rolling Walker.                          Time Tracking:     PT Received On: 08/15/24  PT Start Time: 1036     PT Stop Time: 1049  PT Total Time (min): 13 min     Billable Minutes: Therapeutic Activity 13    Treatment Type: Treatment  PT/PTA: PTA     Number of PTA visits since last PT visit: 1      08/15/2024

## 2024-08-15 NOTE — ASSESSMENT & PLAN NOTE
Patient ESRD HD MWF  Last HD Wednesday 2L off  Daily BMP  Renal diet  Renal dose medication  Nephrology consulted

## 2024-08-15 NOTE — SUBJECTIVE & OBJECTIVE
Interval History:  Patient seen and examined.  NAD.  Patient more awake and alert today, continues with some confusion and tremors.  Discussed with palliative care team who discussed with son.  Plan is for son to discuss comfort care further with the other family members.  He would like to continue with full code and full treatment as of now.  Blood glucose in the 50s on a.m. labs this morning, improved to the 90s with apple juice.  Continue to monitor blood glucose every 6 hours to ensure stability.      Review of Systems   Reason unable to perform ROS: Confusion.     Objective:     Vital Signs (Most Recent):  Temp: 97.8 °F (36.6 °C) (08/15/24 1107)  Pulse: 70 (08/15/24 1107)  Resp: 17 (08/15/24 1107)  BP: (!) 180/80 (08/15/24 1107)  SpO2: 98 % (08/15/24 1107) Vital Signs (24h Range):  Temp:  [97.8 °F (36.6 °C)-99.8 °F (37.7 °C)] 97.8 °F (36.6 °C)  Pulse:  [67-91] 70  Resp:  [17-18] 17  SpO2:  [94 %-98 %] 98 %  BP: (121-190)/(54-80) 180/80     Weight: 47.6 kg (104 lb 15 oz)  Body mass index is 17.46 kg/m².    Intake/Output Summary (Last 24 hours) at 8/15/2024 1139  Last data filed at 8/14/2024 1220  Gross per 24 hour   Intake 0 ml   Output 2000 ml   Net -2000 ml         Physical Exam  Vitals and nursing note reviewed.   Constitutional:       General: She is not in acute distress.     Appearance: She is ill-appearing (Chronically).   HENT:      Head: Normocephalic.      Mouth/Throat:      Mouth: Mucous membranes are moist.      Pharynx: Oropharynx is clear.   Cardiovascular:      Rate and Rhythm: Normal rate and regular rhythm.      Pulses: Normal pulses.      Heart sounds: Normal heart sounds.   Pulmonary:      Effort: Pulmonary effort is normal.      Breath sounds: Normal breath sounds.   Abdominal:      General: Abdomen is flat. Bowel sounds are normal. There is no distension.      Palpations: Abdomen is soft.      Tenderness: There is no abdominal tenderness. There is no guarding.   Musculoskeletal:          "General: Normal range of motion.   Skin:     General: Skin is warm and dry.      Capillary Refill: Capillary refill takes less than 2 seconds.   Neurological:      General: No focal deficit present.      Mental Status: She is alert.      Comments: Tremors and facial twitching noted             Significant Labs: All pertinent labs within the past 24 hours have been reviewed.  CBC:   Recent Labs   Lab 08/14/24  0351 08/15/24  0453   WBC 5.60 4.13   HGB 11.5* 11.8*   HCT 38.6 41.3    283     CMP:   Recent Labs   Lab 08/14/24  0351 08/15/24  0453    137   K 4.8 5.3*   CL 96 102   CO2 28 22*   GLU 77 50*   BUN 46* 36*   CREATININE 8.5* 7.0*   CALCIUM 9.1 9.3   PROT 6.8 7.7   ALBUMIN 3.6 3.8   BILITOT 0.7 0.8   ALKPHOS 59 69   AST 23 23   ALT 17 17   ANIONGAP 15 13     Magnesium:   Recent Labs   Lab 08/14/24  0351 08/15/24  0453   MG 1.9 2.0     Troponin:   No results for input(s): "TROPONINI", "TROPONINIHS" in the last 48 hours.      Significant Imaging:  I have reviewed all pertinent imaging results/findings within the past 24 hours.   "

## 2024-08-16 ENCOUNTER — DOCUMENT SCAN (OUTPATIENT)
Dept: HOME HEALTH SERVICES | Facility: HOSPITAL | Age: 84
End: 2024-08-16
Payer: MEDICARE

## 2024-08-16 LAB
ALBUMIN SERPL BCP-MCNC: 3.3 G/DL (ref 3.5–5.2)
ALP SERPL-CCNC: 59 U/L (ref 55–135)
ALT SERPL W/O P-5'-P-CCNC: 14 U/L (ref 10–44)
ANION GAP SERPL CALC-SCNC: 14 MMOL/L (ref 8–16)
AST SERPL-CCNC: 28 U/L (ref 10–40)
BASOPHILS # BLD AUTO: 0.04 K/UL (ref 0–0.2)
BASOPHILS NFR BLD: 1 % (ref 0–1.9)
BILIRUB SERPL-MCNC: 0.6 MG/DL (ref 0.1–1)
BUN SERPL-MCNC: 54 MG/DL (ref 8–23)
CALCIUM SERPL-MCNC: 8.4 MG/DL (ref 8.7–10.5)
CHLORIDE SERPL-SCNC: 104 MMOL/L (ref 95–110)
CO2 SERPL-SCNC: 15 MMOL/L (ref 23–29)
CREAT SERPL-MCNC: 9.1 MG/DL (ref 0.5–1.4)
DIFFERENTIAL METHOD BLD: ABNORMAL
EOSINOPHIL # BLD AUTO: 0.6 K/UL (ref 0–0.5)
EOSINOPHIL NFR BLD: 14.1 % (ref 0–8)
ERYTHROCYTE [DISTWIDTH] IN BLOOD BY AUTOMATED COUNT: 21.1 % (ref 11.5–14.5)
EST. GFR  (NO RACE VARIABLE): 3.9 ML/MIN/1.73 M^2
GLUCOSE SERPL-MCNC: 102 MG/DL (ref 70–110)
GLUCOSE SERPL-MCNC: 75 MG/DL (ref 70–110)
GLUCOSE SERPL-MCNC: 76 MG/DL (ref 70–110)
GLUCOSE SERPL-MCNC: 90 MG/DL (ref 70–110)
GLUCOSE SERPL-MCNC: 93 MG/DL (ref 70–110)
HCT VFR BLD AUTO: 39.6 % (ref 37–48.5)
HGB BLD-MCNC: 11.3 G/DL (ref 12–16)
IMM GRANULOCYTES # BLD AUTO: 0 K/UL (ref 0–0.04)
IMM GRANULOCYTES NFR BLD AUTO: 0 % (ref 0–0.5)
LYMPHOCYTES # BLD AUTO: 1 K/UL (ref 1–4.8)
LYMPHOCYTES NFR BLD: 26 % (ref 18–48)
MAGNESIUM SERPL-MCNC: 2.1 MG/DL (ref 1.6–2.6)
MCH RBC QN AUTO: 25.9 PG (ref 27–31)
MCHC RBC AUTO-ENTMCNC: 28.5 G/DL (ref 32–36)
MCV RBC AUTO: 91 FL (ref 82–98)
MONOCYTES # BLD AUTO: 0.8 K/UL (ref 0.3–1)
MONOCYTES NFR BLD: 20.3 % (ref 4–15)
NEUTROPHILS # BLD AUTO: 1.5 K/UL (ref 1.8–7.7)
NEUTROPHILS NFR BLD: 38.6 % (ref 38–73)
NRBC BLD-RTO: 0 /100 WBC
PLATELET # BLD AUTO: 238 K/UL (ref 150–450)
PMV BLD AUTO: 9.5 FL (ref 9.2–12.9)
POTASSIUM SERPL-SCNC: 5.4 MMOL/L (ref 3.5–5.1)
PROT SERPL-MCNC: 6.8 G/DL (ref 6–8.4)
RBC # BLD AUTO: 4.37 M/UL (ref 4–5.4)
SODIUM SERPL-SCNC: 133 MMOL/L (ref 136–145)
WBC # BLD AUTO: 3.89 K/UL (ref 3.9–12.7)

## 2024-08-16 PROCEDURE — 25000003 PHARM REV CODE 250: Performed by: NURSE PRACTITIONER

## 2024-08-16 PROCEDURE — 85025 COMPLETE CBC W/AUTO DIFF WBC: CPT | Performed by: NURSE PRACTITIONER

## 2024-08-16 PROCEDURE — 63600175 PHARM REV CODE 636 W HCPCS: Performed by: NURSE PRACTITIONER

## 2024-08-16 PROCEDURE — 97530 THERAPEUTIC ACTIVITIES: CPT | Mod: CQ

## 2024-08-16 PROCEDURE — 21400001 HC TELEMETRY ROOM

## 2024-08-16 PROCEDURE — 36415 COLL VENOUS BLD VENIPUNCTURE: CPT | Performed by: NURSE PRACTITIONER

## 2024-08-16 PROCEDURE — 97535 SELF CARE MNGMENT TRAINING: CPT

## 2024-08-16 PROCEDURE — 90935 HEMODIALYSIS ONE EVALUATION: CPT

## 2024-08-16 PROCEDURE — 25000003 PHARM REV CODE 250: Performed by: INTERNAL MEDICINE

## 2024-08-16 PROCEDURE — 83735 ASSAY OF MAGNESIUM: CPT | Performed by: NURSE PRACTITIONER

## 2024-08-16 PROCEDURE — 63600175 PHARM REV CODE 636 W HCPCS: Mod: JZ,JG | Performed by: INTERNAL MEDICINE

## 2024-08-16 PROCEDURE — 80053 COMPREHEN METABOLIC PANEL: CPT | Performed by: NURSE PRACTITIONER

## 2024-08-16 RX ORDER — DOXYCYCLINE 100 MG/1
100 CAPSULE ORAL 2 TIMES DAILY
Start: 2024-08-16 | End: 2024-08-19

## 2024-08-16 RX ORDER — MIDODRINE HYDROCHLORIDE 10 MG/1
10 TABLET ORAL 3 TIMES DAILY PRN
Start: 2024-08-16 | End: 2024-11-14

## 2024-08-16 RX ADMIN — MUPIROCIN 1 G: 20 OINTMENT TOPICAL at 09:08

## 2024-08-16 RX ADMIN — LATANOPROST 1 DROP: 50 SOLUTION OPHTHALMIC at 08:08

## 2024-08-16 RX ADMIN — ONDANSETRON 4 MG: 2 INJECTION INTRAMUSCULAR; INTRAVENOUS at 10:08

## 2024-08-16 RX ADMIN — EPOETIN ALFA-EPBX 2400 UNITS: 10000 INJECTION, SOLUTION INTRAVENOUS; SUBCUTANEOUS at 12:08

## 2024-08-16 RX ADMIN — AMIODARONE HYDROCHLORIDE 100 MG: 100 TABLET ORAL at 09:08

## 2024-08-16 RX ADMIN — ATORVASTATIN CALCIUM 40 MG: 40 TABLET, FILM COATED ORAL at 08:08

## 2024-08-16 RX ADMIN — Medication 6 MG: at 08:08

## 2024-08-16 RX ADMIN — APIXABAN 2.5 MG: 2.5 TABLET, FILM COATED ORAL at 08:08

## 2024-08-16 RX ADMIN — DOXYCYCLINE HYCLATE 100 MG: 100 CAPSULE ORAL at 09:08

## 2024-08-16 RX ADMIN — MUPIROCIN 1 G: 20 OINTMENT TOPICAL at 08:08

## 2024-08-16 RX ADMIN — APIXABAN 2.5 MG: 2.5 TABLET, FILM COATED ORAL at 09:08

## 2024-08-16 RX ADMIN — DOXYCYCLINE HYCLATE 100 MG: 100 CAPSULE ORAL at 08:08

## 2024-08-16 NOTE — PT/OT/SLP PROGRESS
Physical Therapy Treatment    Patient Name:  Tyra Isaac   MRN:  [id number]    Recommendations:     Discharge Recommendations: Low Intensity Therapy  Discharge Equipment Recommendations:    Barriers to discharge: None    Assessment:     Tyra Isaac is a 84 y.o. female admitted with a medical diagnosis of Acute metabolic encephalopathy.  She presents with the following impairments/functional limitations: weakness, impaired balance, impaired cardiopulmonary response to activity, impaired coordination, gait instability, impaired endurance, decreased lower extremity function, decreased upper extremity function, abnormal tone.    Pt agreeable to visit. Pt required min assist for supine to sit transfer. Pt extremely tremulous when initiating mobility and through transfers. Once seated EOB, pt's tremors subsided for the most part.    Pt performed sit to stand transfer with RW and max assist x 2. Pt far to tremulous to safely maintain stand.    Squat pivot transfer from bed to chair with max assist, extremely tremulous with upper extremities flailing. Pt left up in chair with alarm on.       Assisted nursing to return pt back to bed later as pt was much more tremulous than earlier. Pt required max assist for squat pivot back to bed. Pt required mod assist x 1-2 persons for sit to supine transfer.    Rehab Prognosis: Fair; patient would benefit from acute skilled PT services to address these deficits and reach maximum level of function.    Recent Surgery: * No surgery found *      Plan:     During this hospitalization, patient to be seen 5 x/week to address the identified rehab impairments via gait training, therapeutic activities, therapeutic exercises and progress toward the following goals:    Plan of Care Expires:       Subjective     Chief Complaint: fearful with mobility  Patient/Family Comments/goals: to get better  Pain/Comfort:  Pain Rating 1: 0/10      Objective:     Communicated with nurse prior to  session.  Patient found HOB elevated with bed alarm, telemetry upon PT entry to room.     General Precautions: Standard,    Orthopedic Precautions: N/A  Braces: N/A  Respiratory Status: Room air     Functional Mobility:  Bed Mobility:     Supine to Sit: minimum assistance  Sit to Supine: moderate assistance and of 1-2 persons  Transfers:     Sit to Stand:  maximal assistance and of 2 persons with rolling walker  Bed to Chair: maximal assistance with  no AD  using  Squat Pivot      AM-PAC 6 CLICK MOBILITY          Treatment & Education:  Pt educated on importance of time OOB, importance of intermittent mobility, safe techniques for transfers/ambulation, discharge recommendations/options, and use of call light for assistance and fall prevention.      Patient left up in chair with all lines intact, call button in reach, chair alarm on, and nurse notified..    GOALS:   Multidisciplinary Problems       Physical Therapy Goals          Problem: Physical Therapy    Goal Priority Disciplines Outcome Goal Variances Interventions   Physical Therapy Goal     PT, PT/OT Progressing     Description: Goals to be met by: 24     Patient will increase functional independence with mobility by performin. Supine to sit with Stand-by Assistance  2. Sit to supine with MInimal Assistance  3. Sit to stand transfer with Moderate Assistance  4. Bed to chair transfer with Moderate Assistance using Rolling Walker  5. Gait  x 40 feet with Moderate Assistance using Rolling Walker.                          Time Tracking:     PT Received On: 24  PT Start Time: 914     PT Stop Time: 926  PT Total Time (min): 12 min     Billable Minutes: Therapeutic Activity 12    Treatment Type: Treatment  PT/PTA: PTA     Number of PTA visits since last PT visit: 2     2024

## 2024-08-16 NOTE — PT/OT/SLP PROGRESS
"Occupational Therapy   Treatment    Name: Tyra Isaac  MRN: [id number]  Admitting Diagnosis:  Acute metabolic encephalopathy       Recommendations:     Discharge Recommendations: Low Intensity Therapy  Discharge Equipment Recommendations:  none  Barriers to discharge:  Inaccessible home environment, Decreased caregiver support    Assessment:     Tyra Iasac is a 84 y.o. female with a medical diagnosis of Acute metabolic encephalopathy.  She presents with a severe tremors that hinder her ability to participate in ADLs and functional transfers. When OT arrived, patient was sitting in a chair covered in food and drink from her breakfast due to her tremors. OT gathered things to help clean her up and change her clothes. In the middle of cleaning up patient, patient threw up. OT notified nurse and kept cleaning. Pt was left with nursing staff. Performance deficits affecting function are weakness, impaired endurance, impaired self care skills, impaired functional mobility, impaired balance, decreased coordination, decreased upper extremity function, decreased lower extremity function, decreased safety awareness, pain.     Rehab Prognosis:  Fair; patient would benefit from acute skilled OT services to address these deficits and reach maximum level of function.       Plan:     Patient to be seen 3 x/week to address the above listed problems via self-care/home management, therapeutic activities, therapeutic exercises  Plan of Care Expires: 09/05/24  Plan of Care Reviewed with: patient    Subjective     Chief Complaint: "I have a stomach ache"  Patient/Family Comments/goals: Increase independence with ADLs and functional mobility   Pain/Comfort:  Pain Rating 1: 4/10  Location 1: abdomen  Pain Addressed 1: Nurse notified  Pain Rating Post-Intervention 1: 4/10    Objective:     Communicated with: Nurse prior to session.  Patient found up in chair with peripheral IV, telemetry, bed alarm upon OT entry to " room.    General Precautions: Standard, fall    Orthopedic Precautions:N/A  Braces: N/A  Respiratory Status: Room air     Occupational Performance:     Activities of Daily Living:  Feeding:  moderate assistance sitting in chair due to tremors. Pt was unable to get food and drink into her mouth without assistance   Grooming: stand by assistance to wash her face while sitting down   Upper Body Dressing: moderate assistance to doff dirty gown and don clean gown while sitting down  Lower Body Dressing: maximal assistance to doff dirty socks and don clean socks while sitting down    Treatment & Education:  Pt was educated on the role of occupational therapy and the importance of completing ADLs and functional mobility.     Patient left up in chair with all lines intact, call button in reach, bed alarm on, and nursing staff present    GOALS:   Multidisciplinary Problems       Occupational Therapy Goals          Problem: Occupational Therapy    Goal Priority Disciplines Outcome Interventions   Occupational Therapy Goal     OT, PT/OT Progressing    Description: Goals to be met by: 9/5/2024     Patient will increase functional independence with ADLs by performing:    Feeding with Supervision.  Grooming while EOB with Supervision.  Sitting at edge of bed x20 minutes with Supervision.  Supine to sit with Stand-by Assistance.  Toilet transfer to bedside commode with Minimal Assistance.                         Time Tracking:     OT Date of Treatment: 08/16/24  OT Start Time: 1020  OT Stop Time: 1043  OT Total Time (min): 23 min    Billable Minutes:Self Care/Home Management 23    OT/ANSHUL: OT          8/16/2024

## 2024-08-16 NOTE — NURSING
Nurses Note -- 4 Eyes      8/16/2024   6:33 PM      Skin assessed during: Daily Assessment      [] No Altered Skin Integrity Present    []Prevention Measures Documented      [] Yes- Altered Skin Integrity Present or Discovered   [] LDA Added if Not in Epic (Describe Wound)   [] New Altered Skin Integrity was Present on Admit and Documented in LDA   [] Wound Image Taken    Wound Care Consulted? No    Attending Nurse:  Bridgette Henao RN/Staff Member:   AMY Yepez

## 2024-08-16 NOTE — ASSESSMENT & PLAN NOTE
Patient ESRD HD MWF  Last HD today, only 850cc removed due to hypotension  Daily BMP  Renal diet  Renal dose medication  Nephrology consulted

## 2024-08-16 NOTE — NURSING
Nurses Note -- 4 Eyes      8/16/2024   5:22 AM      Skin assessed during: Q Shift Change      [x] No Altered Skin Integrity Present    [x]Prevention Measures Documented      [] Yes- Altered Skin Integrity Present or Discovered   [] LDA Added if Not in Epic (Describe Wound)   [] New Altered Skin Integrity was Present on Admit and Documented in LDA   [] Wound Image Taken    Wound Care Consulted? No    Attending Nurse:  Kemi Henao RN/Staff Member: AMY Pang

## 2024-08-16 NOTE — PROGRESS NOTES
INPATIENT NEPHROLOGY CONSULT   [facility] NEPHROLOGY    Tyra Isaac  08/16/2024    Reason for consultation:  esrd    Chief Complaint:   Chief Complaint   Patient presents with    Generalized Weakness     Patient arrived to dialysis with generalized weakness - dialysis completed with 1.5L removed then EMS called for weakness - EMS talked to patient's son and stated that patient has been having generalized weakness and confusion for the past 3 weeks - hx of bradycardia and afib - pt's son states that BP has been difficult to control and has been trying different cardiac medications - currently on metoprolol.     History of Present Illness:     Per H and P  The patient is a 84 year old female with a PMHx of ESRD on HD ([facility] - Dr. Mott), Atrial fibrillation (on Eliquis), HTN, CAD, COPD, previous CVA who presnted to the emergency department for the evaluation of generalized weakness x 3 weeks. Patient was recently evaluated in the emergency department here at [facility] 8/9/24 for the evaluation of shortness of breath with cough and generalized weakness. Chest xray at that time revealed right basilar ground-glass opacities, suspicious for viral/atypical pneumonia vs pulmonary edema. She was discharged home with doxycycline. Prior to that she was admitted here and discharged on 7/19/24 after a admission for bradycardia for which her amiodarone and metoprolol were discontinued per cardiology and she was placed on cardizem. However, upon follow up she was taken off her cardizem and placed back on amiodarone at 100 mg daily by her cardiologist Dr. Thompson. Patient went to HD this am with 1.5L off and patient reported generalized weakness following treatment. EMS was activated and spoke to patient's son who reports that she was been experiencing weakness and confusion over the past three weeks. She has also been having difficulty controlling her blood pressure and has had some medication changes as of recent.      Patient  evaluated in the emergency department - COVID and flu negative. Electrolytes stable. BNP 3400, high sensitivity troponin 226, EKG with normal sinus rhythm, 71 beats per minute with incomplete right bundle-branch block. Septal infarct age undetermined with T-wave abnormality in the inferior and anterior leads. Patient admitted to hospital medicine service for further evaluation    8/14 VSS. Seen on HD today.  8/15 VSS. HD tomorrow. Low BG, needs better DM control.  8/16 VSS. HD today.    Plan of Care:    esrd  --continue dialysis per routine  --fluid restrict  --renal dose medication per routine  --continue outpt medication  --continue binders with meals    Anemia of CKD  --erythropoiesis stimulating agent with renal replacement therapy    Secondary HPT  --renal diet  --continue binders    Hypertension  --uf with hd  --fluid restrict  --low salt diet  --continue home medication    Thank you for allowing us to participate in this patient's care. We will continue to follow.    Vital Signs:  Temp Readings from Last 3 Encounters:   08/16/24 98.6 °F (37 °C) (Temporal)   08/09/24 98.2 °F (36.8 °C) (Oral)   07/30/24 98.6 °F (37 °C)       Pulse Readings from Last 3 Encounters:   08/16/24 75   08/09/24 68   07/30/24 (P) 65       BP Readings from Last 3 Encounters:   08/16/24 (!) 176/76   08/09/24 (!) 144/68   07/30/24 (!) 118/52       Weight:  Wt Readings from Last 3 Encounters:   08/13/24 47.6 kg (104 lb 15 oz)   08/09/24 46.3 kg (102 lb)   07/30/24 44.9 kg (99 lb)       Past Medical & Surgical History:  Past Medical History:   Diagnosis Date    A-fib     Anxiety     Depression     Disorder of kidney and ureter     Encephalopathy acute 1/1/2018    End stage kidney disease 6/17/2017    Gout     Hyperlipidemia     Hypertension     Moderate episode of recurrent major depressive disorder 1/17/2018    Nephropathy hypertensive, stage 5 chronic kidney disease or end stage renal disease 6/17/2017    Obstructive pattern present on  pulmonary function testing 7/28/2021    Shows moderate obstruction.    Osteopenia of multiple sites 3/9/2018    Based upon bone density measurements. Patient also has chronic kidney disease.    Stroke 11/2016       Past Surgical History:   Procedure Laterality Date    ANGIOGRAM, CORONARY, WITH LEFT HEART CATHETERIZATION N/A 2/7/2022    Procedure: Angiogram, Coronary, with Left Heart Cath;  Surgeon: Gino Leal MD;  Location: [facility] CATH/EP LAB;  Service: Cardiology;  Laterality: N/A;    CARDIAC SURGERY      stents    EYE SURGERY      WRIST SURGERY         Past Social History:  Social History     Socioeconomic History    Marital status:      Spouse name: Aria Isaac    Number of children: 3   Occupational History    Occupation: Not working   Tobacco Use    Smoking status: Never    Smokeless tobacco: Never   Substance and Sexual Activity    Alcohol use: No    Drug use: No    Sexual activity: Not Currently     Social Determinants of Health     Financial Resource Strain: Low Risk  (8/13/2024)    Overall Financial Resource Strain (CARDIA)     Difficulty of Paying Living Expenses: Not hard at all   Recent Concern: Financial Resource Strain - Medium Risk (7/11/2024)    Overall Financial Resource Strain (CARDIA)     Difficulty of Paying Living Expenses: Somewhat hard   Food Insecurity: No Food Insecurity (8/13/2024)    Hunger Vital Sign     Worried About Running Out of Food in the Last Year: Never true     Ran Out of Food in the Last Year: Never true   Transportation Needs: No Transportation Needs (8/13/2024)    TRANSPORTATION NEEDS     Transportation : No   Physical Activity: Inactive (8/13/2024)    Exercise Vital Sign     Days of Exercise per Week: 0 days     Minutes of Exercise per Session: 0 min   Stress: Patient Unable To Answer (8/13/2024)    [employer] [employer] - Occupational Stress Questionnaire     Feeling of Stress : Patient unable to answer   Recent Concern: Stress -  Stress Concern Present (7/11/2024)    [employer] - Occupational Stress Questionnaire     Feeling of Stress : To some extent   Housing Stability: Low Risk  (8/13/2024)    Housing Stability Vital Sign     Unable to Pay for Housing in the Last Year: No     Homeless in the Last Year: No       Medications:  No current facility-administered medications on file prior to encounter.     Current Outpatient Medications on File Prior to Encounter   Medication Sig Dispense Refill    amiodarone (PACERONE) 100 MG Tab Take 1 tablet (100 mg total) by mouth once daily. 30 tablet 0    atorvastatin (LIPITOR) 40 MG tablet Take 40 mg by mouth once daily.      b complex vitamins tablet Take 1 tablet by mouth once daily.      doxycycline (VIBRAMYCIN) 100 MG Cap Take 1 capsule (100 mg total) by mouth 2 (two) times daily. for 10 days 20 capsule 0    ELIQUIS 2.5 mg Tab Take 2.5 mg by mouth 2 (two) times daily.      latanoprost 0.005 % ophthalmic solution Place 1 drop into both eyes every evening.      metoclopramide HCl (REGLAN) 5 MG tablet Take 1 tablet (5 mg total) by mouth 3 (three) times daily before meals. 30 tablet 1    midodrine (PROAMATINE) 10 MG tablet Take 1 tablet (10 mg total) by mouth 3 (three) times daily with meals. 270 tablet 0    ondansetron (ZOFRAN-ODT) 4 MG TbDL Take 1 tablet (4 mg total) by mouth every 6 (six) hours as needed (nausea). 30 tablet 5    sodium zirconium cyclosilicate (LOKELMA) 10 gram packet Take 1 packet (10 g total) by mouth every Tuesday, Thursday, Saturday, Sunday. Mix entire contents of packet(s) into drinking glass containing 3 tablespoons of water; stir well and drink immediately. Add water and repeat until no powder remains to receive entire dose. (Patient not taking: Reported on 8/12/2024) 16 packet 0    VELTASSA 8.4 gram PwPk Take 8.4 g by mouth once daily. (Patient not taking: Reported on 8/12/2024)       Scheduled Meds:   0.9% NaCl   Intravenous Once    amiodarone   "100 mg Oral Daily    apixaban  2.5 mg Oral BID    atorvastatin  40 mg Oral Daily    doxycycline  100 mg Oral BID    epoetin rosy-epbx  50 Units/kg Subcutaneous Every Mon, Wed, Fri    latanoprost  1 drop Both Eyes QHS    mupirocin   Nasal BID    sodium zirconium cyclosilicate  10 g Oral Every Tues, Thurs, Sat, Sun     Continuous Infusions:  PRN Meds:.  Current Facility-Administered Medications:     0.9% NaCl, , Intravenous, PRN    acetaminophen, 650 mg, Oral, Q8H PRN    acetaminophen, 650 mg, Oral, Q4H PRN    aluminum-magnesium hydroxide-simethicone, 30 mL, Oral, QID PRN    dextrose 50%, 12.5 g, Intravenous, PRN    dextrose 50%, 25 g, Intravenous, PRN    glucagon (human recombinant), 1 mg, Intramuscular, PRN    glucose, 16 g, Oral, PRN    glucose, 24 g, Oral, PRN    heparin (porcine), 5,000 Units, Intravenous, PRN    hydrALAZINE, 10 mg, Intravenous, Q6H PRN    melatonin, 6 mg, Oral, Nightly PRN    naloxone, 0.02 mg, Intravenous, PRN    ondansetron, 4 mg, Intravenous, Q6H PRN    sodium chloride 0.9%, 250 mL, Intravenous, PRN    Allergies:  Cyclobenzaprine, Fish containing products, Peanut, and Tramadol    Past Family History:  Reviewed; refer to Hospitalist Admission Note    Review of Systems:  Review of Systems - All 14 systems reviewed and negative, except as noted in HPI    Physical Exam:    BP (!) 176/76   Pulse 75   Temp 98.6 °F (37 °C) (Temporal)   Resp 17   Ht 5' 5" (1.651 m)   Wt 47.6 kg (104 lb 15 oz)   SpO2 98%   BMI 17.46 kg/m²     General Appearance:    Alert, cooperative, no distress, appears stated age   Head:    Normocephalic, without obvious abnormality, atraumatic   Eyes:    PER, conjunctiva/corneas clear, EOM's intact in both eyes        Throat:   Lips, mucosa, and tongue normal; teeth and gums normal   Back:     Symmetric, no curvature, ROM normal, no CVA tenderness   Lungs:     Clear to auscultation bilaterally, respirations unlabored   Chest wall:    No tenderness or deformity   Heart:    " Regular rate and rhythm, S1 and S2 normal, no murmur, rub   or gallop   Abdomen:     Soft, non-tender, bowel sounds active all four quadrants,     no masses, no organomegaly   Extremities:   Extremities normal, atraumatic, no cyanosis or edema   Pulses:   2+ and symmetric all extremities   MSK:   No joint or muscle swelling, tenderness or deformity   Skin:   Skin color, texture, turgor normal, no rashes or lesions   Neurologic:   CNII-XII intact, normal strength and sensation       Throughout.  No flap     Results:  Lab Results   Component Value Date     (L) 08/16/2024    K 5.4 (H) 08/16/2024     08/16/2024    CO2 15 (L) 08/16/2024    BUN 54 (H) 08/16/2024    CREATININE 9.1 (H) 08/16/2024    CALCIUM 8.4 (L) 08/16/2024    ANIONGAP 14 08/16/2024    ESTGFRAFRICA 4.7 (A) 02/08/2022    EGFRNONAA 4.0 (A) 02/08/2022       Lab Results   Component Value Date    CALCIUM 8.4 (L) 08/16/2024    PHOS 3.0 08/13/2024       Recent Labs   Lab 08/16/24  0415   WBC 3.89*   RBC 4.37   HGB 11.3*   HCT 39.6      MCV 91   MCH 25.9*   MCHC 28.5*        Patient care was time spent personally by me on the following activities:   Obtaining a history  Examination of patient.  Providing medical care at the patients bedside.  Developing a treatment plan with patient or surrogate and bedside caregivers  Ordering and reviewing laboratory studies, radiographic studies, pulse oximetry.  Ordering and performing treatments and interventions.  Evaluation of patient's response to treatment.  Discussions with consultants while on the unit and immediately available to the patient.  Re-evaluation of the patient's condition.  Documentation in the medical record.       Raleigh Yee MD  [facility]  [phone]

## 2024-08-16 NOTE — ASSESSMENT & PLAN NOTE
Chronic, controlled.  Latest blood pressure and vitals reviewed-     Temp:  [97.4 °F (36.3 °C)-98.6 °F (37 °C)]   Pulse:  [73-91]   Resp:  [17-18]   BP: ()/(32-76)   SpO2:  [97 %-99 %] .   Home meds for hypertension were reviewed    While in the hospital, will manage blood pressure as follows; Continue home antihypertensive regimen    Will utilize p.r.n. blood pressure medication only if patient's blood pressure greater than 180/110 and she develops symptoms such as worsening chest pain or shortness of breath.

## 2024-08-16 NOTE — SUBJECTIVE & OBJECTIVE
Interval History:  Patient seen and examined.  NAD.  Blood glucose better controlled today.  Only 850 cc able to be removed during dialysis today due to hypotension.  Patient hypotensive after dialysis.      Review of Systems   Reason unable to perform ROS: Confusion.     Objective:     Vital Signs (Most Recent):  Temp: 98.2 °F (36.8 °C) (08/16/24 1430)  Pulse: 90 (08/16/24 1430)  Resp: 18 (08/16/24 1430)  BP: (!) 102/50 (08/16/24 1430)  SpO2: 97 % (08/16/24 1430) Vital Signs (24h Range):  Temp:  [97.4 °F (36.3 °C)-98.6 °F (37 °C)] 98.2 °F (36.8 °C)  Pulse:  [73-91] 90  Resp:  [17-18] 18  SpO2:  [97 %-99 %] 97 %  BP: ()/(32-76) 102/50     Weight: 47.6 kg (104 lb 15 oz)  Body mass index is 17.46 kg/m².    Intake/Output Summary (Last 24 hours) at 8/16/2024 1510  Last data filed at 8/16/2024 1430  Gross per 24 hour   Intake 500 ml   Output 1350 ml   Net -850 ml         Physical Exam  Vitals and nursing note reviewed.   Constitutional:       General: She is not in acute distress.     Appearance: She is ill-appearing (Chronically).   HENT:      Head: Normocephalic.      Mouth/Throat:      Mouth: Mucous membranes are moist.      Pharynx: Oropharynx is clear.   Cardiovascular:      Rate and Rhythm: Normal rate and regular rhythm.      Pulses: Normal pulses.      Heart sounds: Normal heart sounds.   Pulmonary:      Effort: Pulmonary effort is normal.      Breath sounds: Normal breath sounds.   Abdominal:      General: Abdomen is flat. Bowel sounds are normal. There is no distension.      Palpations: Abdomen is soft.      Tenderness: There is no abdominal tenderness. There is no guarding.   Musculoskeletal:         General: Normal range of motion.   Skin:     General: Skin is warm and dry.      Capillary Refill: Capillary refill takes less than 2 seconds.   Neurological:      General: No focal deficit present.      Mental Status: She is alert.      Comments: Tremors and facial twitching noted             Significant  "Labs: All pertinent labs within the past 24 hours have been reviewed.  CBC:   Recent Labs   Lab 08/15/24  0453 08/16/24  0415   WBC 4.13 3.89*   HGB 11.8* 11.3*   HCT 41.3 39.6    238     CMP:   Recent Labs   Lab 08/15/24  0453 08/16/24  0415    133*   K 5.3* 5.4*    104   CO2 22* 15*   GLU 50* 75   BUN 36* 54*   CREATININE 7.0* 9.1*   CALCIUM 9.3 8.4*   PROT 7.7 6.8   ALBUMIN 3.8 3.3*   BILITOT 0.8 0.6   ALKPHOS 69 59   AST 23 28   ALT 17 14   ANIONGAP 13 14     Magnesium:   Recent Labs   Lab 08/15/24  0453 08/16/24  0415   MG 2.0 2.1     Troponin:   No results for input(s): "TROPONINI", "TROPONINIHS" in the last 48 hours.      Significant Imaging:  I have reviewed all pertinent imaging results/findings within the past 24 hours.   "

## 2024-08-16 NOTE — PROGRESS NOTES
850 ml net uf, unable to obtain further uf do to hypotension   08/16/24 1430   Required for all Hemodialysis Patients   Hepatitis Status negative   Handoff Report   Received From [facility]   Given To Bridgette   Treatment Type   Treatment Type Maintenance   Vital Signs   Temp 98.2 °F (36.8 °C)   Temp Source Oral   Pulse 90   Resp 18   SpO2 97 %   BP (!) 102/50   BP Location Left arm   BP Method Automatic   Patient Position Lying   Assessments (Pre/Post)   Safety vein preservation armband present   Date Hepatitis Profile Obtained 11/10/23   Blood Liters Processed (BLP) 51.9   Transport Modality stretcher   Level of Consciousness (AVPU) alert   Dialyzer Clearance mildly streaked   Pain   Preferred Pain Scale number (Numeric Rating Pain Scale)   Pain Rating (0-10): Rest 0   Pre-Hemodialysis Assessment   Patient Status Departed        Hemodialysis AV Fistula Right upper arm   No Placement Date or Time found.   Present Prior to Hospital Arrival?: Yes  Location: Right upper arm   Site Assessment Clean;Dry;Intact   Patency Present;Thrill;Bruit   Status Deaccessed   Flows Good   Dressing Status Clean;Dry;Intact   Site Condition No complications   Post-Hemodialysis Assessment   Rinseback Volume (mL) 250 mL   Blood Volume Processed (Liters) 51.9 L   Dialyzer Clearance Lightly streaked   Duration of Treatment 180 minutes   Additional Fluid Intake (mL) 500 mL   Total UF (mL) 1350 mL   Net Fluid Removal 850   Patient Response to Treatment hypotensive   Post-Treatment Weight 46.8 kg (103 lb 2.8 oz)   Treatment Weight Change -0.8   Arterial bleeding stop time (min) 10 min   Venous bleeding stop time (min) 5 min   Post-Hemodialysis Comments tx completed     Educated on hd tx

## 2024-08-16 NOTE — PROGRESS NOTES
[facility] Medicine  Progress Note    Patient Name: Tyra Isaac  MRN: [id number]  Patient Class: IP- Inpatient   Admission Date: 8/12/2024  Length of Stay: 3 days  Attending Physician: German Michael DO  Primary Care Provider: Kalpesh Goel MD        Subjective:     Principal Problem:Acute metabolic encephalopathy        HPI:  The patient is a 84 year old female with a PMHx of ESRD on HD ([facility] - Dr. Mott), Atrial fibrillation (on Eliquis), HTN, CAD, COPD, previous CVA who presnted to the emergency department for the evaluation of generalized weakness x 3 weeks. Patient was recently evaluated in the emergency department here at [facility] 8/9/24 for the evaluation of shortness of breath with cough and generalized weakness. Chest xray at that time revealed right basilar ground-glass opacities, suspicious for viral/atypical pneumonia vs pulmonary edema. She was discharged home with doxycycline. Prior to that she was admitted here and discharged on 7/19/24 after a admission for bradycardia for which her amiodarone and metoprolol were discontinued per cardiology and she was placed on cardizem. However, upon follow up she was taken off her cardizem and placed back on amiodarone at 100 mg daily by her cardiologist Dr. Thompson. Patient went to HD this am with 1.5L off and patient reported generalized weakness following treatment. EMS was activated and spoke to patient's son who reports that she was been experiencing weakness and confusion over the past three weeks. She has also been having difficulty controlling her blood pressure and has had some medication changes as of recent.     Patient evaluated in the emergency department - COVID and flu negative. Electrolytes stable. BNP 3400, high sensitivity troponin 226, EKG with normal sinus rhythm, 71 beats per minute with incomplete right bundle-branch block. Septal infarct age undetermined with T-wave abnormality in the inferior and anterior leads.  Patient admitted to hospital medicine service for further evaluation.           Overview/Hospital Course:  84-year-old female admitted with confusion, weakness, tremors.  Patient has remained hemodynamically stable.  CBC with mild anemia secondary for ESRD, CMP with stable electrolytes and consistent with ESRD.  Tropnin trended down from 226 on admit to 170 likely 2/2 demand ischemia from fluid overload/ESRD.  CT head without contrast with no interval changes from previous MRI in 11/2022, chronic age-related changes, small foci of lacunar infarction that is stable from previous imaging.  Per further chart review, patient was prescribed Reglan for possible gastroparesis in July 2024.  Reglan was discontinued as it may be contributing to her tremors as well as facial movements and confusion.  Maintained on HD schedule per nephrology.  PT/OT consulted.  Palliative care team has been consulted and is following patient, son is to have further discussion with more family members regarding comfort care.  He would like to proceed with full code and care as of now.  Patient had dialysis today, only 850 mL able to be removed due to hypotension.  Patient remained hypotensive after dialysis.  I discussed with son over the phone, we will keep patient overnight and monitor.  We will discuss with Nephrology and if patient stable overnight maybe DC home with home health tomorrow.    Interval History:  Patient seen and examined.  NAD.  Blood glucose better controlled today.  Only 850 cc able to be removed during dialysis today due to hypotension.  Patient hypotensive after dialysis.      Review of Systems   Reason unable to perform ROS: Confusion.     Objective:     Vital Signs (Most Recent):  Temp: 98.2 °F (36.8 °C) (08/16/24 1430)  Pulse: 90 (08/16/24 1430)  Resp: 18 (08/16/24 1430)  BP: (!) 102/50 (08/16/24 1430)  SpO2: 97 % (08/16/24 1430) Vital Signs (24h Range):  Temp:  [97.4 °F (36.3 °C)-98.6 °F (37 °C)] 98.2 °F (36.8  °C)  Pulse:  [73-91] 90  Resp:  [17-18] 18  SpO2:  [97 %-99 %] 97 %  BP: ()/(32-76) 102/50     Weight: 47.6 kg (104 lb 15 oz)  Body mass index is 17.46 kg/m².    Intake/Output Summary (Last 24 hours) at 8/16/2024 1510  Last data filed at 8/16/2024 1430  Gross per 24 hour   Intake 500 ml   Output 1350 ml   Net -850 ml         Physical Exam  Vitals and nursing note reviewed.   Constitutional:       General: She is not in acute distress.     Appearance: She is ill-appearing (Chronically).   HENT:      Head: Normocephalic.      Mouth/Throat:      Mouth: Mucous membranes are moist.      Pharynx: Oropharynx is clear.   Cardiovascular:      Rate and Rhythm: Normal rate and regular rhythm.      Pulses: Normal pulses.      Heart sounds: Normal heart sounds.   Pulmonary:      Effort: Pulmonary effort is normal.      Breath sounds: Normal breath sounds.   Abdominal:      General: Abdomen is flat. Bowel sounds are normal. There is no distension.      Palpations: Abdomen is soft.      Tenderness: There is no abdominal tenderness. There is no guarding.   Musculoskeletal:         General: Normal range of motion.   Skin:     General: Skin is warm and dry.      Capillary Refill: Capillary refill takes less than 2 seconds.   Neurological:      General: No focal deficit present.      Mental Status: She is alert.      Comments: Tremors and facial twitching noted             Significant Labs: All pertinent labs within the past 24 hours have been reviewed.  CBC:   Recent Labs   Lab 08/15/24  0453 08/16/24  0415   WBC 4.13 3.89*   HGB 11.8* 11.3*   HCT 41.3 39.6    238     CMP:   Recent Labs   Lab 08/15/24  0453 08/16/24  0415    133*   K 5.3* 5.4*    104   CO2 22* 15*   GLU 50* 75   BUN 36* 54*   CREATININE 7.0* 9.1*   CALCIUM 9.3 8.4*   PROT 7.7 6.8   ALBUMIN 3.8 3.3*   BILITOT 0.8 0.6   ALKPHOS 69 59   AST 23 28   ALT 17 14   ANIONGAP 13 14     Magnesium:   Recent Labs   Lab 08/15/24  0453 08/16/24  0415   MG  "2.0 2.1     Troponin:   No results for input(s): "TROPONINI", "TROPONINIHS" in the last 48 hours.      Significant Imaging:  I have reviewed all pertinent imaging results/findings within the past 24 hours.     Assessment/Plan:      * Acute metabolic encephalopathy  Patient presents with generalized confusion with three week history of confusion  Recently treated for possible pneumonia, continue doxy   Recurrent episodes of confusion, frequent hospitalization, chronic renal disease and debility - palliative care team following  UA negative for infection  CT brain without contrast without acute findings - previous CVA noted stable from prior exam, age related changes  D/C Reglan as this could be affecting patient's cognition and causing tremors   PT/OT ordered for evaluation   Supportive care     Goals of care, counseling/discussion  Palliative care team following  Son would like to discuss with other family members regarding comfort care  Patient to remain full code and full treatment at this time  Plan is for patient to DC home with home health and to have palliative care follow outpatient as well         ESRD (end stage renal disease)  Patient ESRD HD MWF  Last HD today, only 850cc removed due to hypotension  Daily BMP  Renal diet  Renal dose medication  Nephrology consulted    Demand ischemia  Initial troponin 226  EKG normal sinus rhythm, 71 beats per minute with incomplete right bundle-branch block. Septal infarct age undetermined with T-wave abnormality in the inferior and anterior leads.   Patient with history of ESRD - last troponin from 8/9/24 34   Last echo from 6/28/24 with EF 64%, mild AS, mod MR, no WMA   Nuclear stress 6/28/24 without reversible ischemia   Cardiology consulted   Troponin trending down from 226 to 177   Continue telemetry monitoring     Atrial fibrillation  Patient currently in NSR  Continue amiodarone 100 mg daily  Continue Eliquis 2.5 mg bid   Continue telemetry monitoring   Cardiology " consulted     Benign hypertension with CKD (chronic kidney disease) stage V  Chronic, controlled.  Latest blood pressure and vitals reviewed-     Temp:  [97.4 °F (36.3 °C)-98.6 °F (37 °C)]   Pulse:  [73-91]   Resp:  [17-18]   BP: ()/(32-76)   SpO2:  [97 %-99 %] .   Home meds for hypertension were reviewed    While in the hospital, will manage blood pressure as follows; Continue home antihypertensive regimen    Will utilize p.r.n. blood pressure medication only if patient's blood pressure greater than 180/110 and she develops symptoms such as worsening chest pain or shortness of breath.        VTE Risk Mitigation (From admission, onward)           Ordered     heparin (porcine) injection 5,000 Units  As needed (PRN)         08/14/24 0810     apixaban tablet 2.5 mg  2 times daily         08/12/24 1613     IP VTE HIGH RISK PATIENT  Once         08/12/24 1536     Place sequential compression device  Until discontinued         08/12/24 1536                    Discharge Planning   ILAN: 8/16/2024     Code Status: Full Code   Is the patient medically ready for discharge?:     Reason for patient still in hospital (select all that apply): Patient trending condition, Treatment, and Consult recommendations  Discharge Plan A: Home Health                  KELBY Wheatley  Department of Hospital Medicine   [facility]

## 2024-08-16 NOTE — ASSESSMENT & PLAN NOTE
Palliative care team following  Son would like to discuss with other family members regarding comfort care  Patient to remain full code and full treatment at this time  Plan is for patient to DC home with home health and to have palliative care follow outpatient as well

## 2024-08-17 VITALS
WEIGHT: 104.94 LBS | SYSTOLIC BLOOD PRESSURE: 145 MMHG | RESPIRATION RATE: 20 BRPM | OXYGEN SATURATION: 97 % | HEART RATE: 64 BPM | BODY MASS INDEX: 17.48 KG/M2 | HEIGHT: 65 IN | DIASTOLIC BLOOD PRESSURE: 62 MMHG | TEMPERATURE: 99 F

## 2024-08-17 LAB
ALBUMIN SERPL BCP-MCNC: 3.4 G/DL (ref 3.5–5.2)
ALP SERPL-CCNC: 59 U/L (ref 55–135)
ALT SERPL W/O P-5'-P-CCNC: 13 U/L (ref 10–44)
ANION GAP SERPL CALC-SCNC: 10 MMOL/L (ref 8–16)
AST SERPL-CCNC: 21 U/L (ref 10–40)
BASOPHILS # BLD AUTO: 0.03 K/UL (ref 0–0.2)
BASOPHILS NFR BLD: 0.8 % (ref 0–1.9)
BILIRUB SERPL-MCNC: 0.7 MG/DL (ref 0.1–1)
BUN SERPL-MCNC: 34 MG/DL (ref 8–23)
CALCIUM SERPL-MCNC: 8.7 MG/DL (ref 8.7–10.5)
CHLORIDE SERPL-SCNC: 98 MMOL/L (ref 95–110)
CO2 SERPL-SCNC: 30 MMOL/L (ref 23–29)
CREAT SERPL-MCNC: 7.3 MG/DL (ref 0.5–1.4)
DIFFERENTIAL METHOD BLD: ABNORMAL
EOSINOPHIL # BLD AUTO: 0.5 K/UL (ref 0–0.5)
EOSINOPHIL NFR BLD: 13.8 % (ref 0–8)
ERYTHROCYTE [DISTWIDTH] IN BLOOD BY AUTOMATED COUNT: 20.5 % (ref 11.5–14.5)
EST. GFR  (NO RACE VARIABLE): 5.1 ML/MIN/1.73 M^2
GLUCOSE SERPL-MCNC: 121 MG/DL (ref 70–110)
GLUCOSE SERPL-MCNC: 61 MG/DL (ref 70–110)
HCT VFR BLD AUTO: 36.9 % (ref 37–48.5)
HGB BLD-MCNC: 11 G/DL (ref 12–16)
IMM GRANULOCYTES # BLD AUTO: 0.01 K/UL (ref 0–0.04)
IMM GRANULOCYTES NFR BLD AUTO: 0.3 % (ref 0–0.5)
LYMPHOCYTES # BLD AUTO: 1 K/UL (ref 1–4.8)
LYMPHOCYTES NFR BLD: 25.8 % (ref 18–48)
MAGNESIUM SERPL-MCNC: 1.9 MG/DL (ref 1.6–2.6)
MCH RBC QN AUTO: 25.9 PG (ref 27–31)
MCHC RBC AUTO-ENTMCNC: 29.8 G/DL (ref 32–36)
MCV RBC AUTO: 87 FL (ref 82–98)
MONOCYTES # BLD AUTO: 0.8 K/UL (ref 0.3–1)
MONOCYTES NFR BLD: 19.6 % (ref 4–15)
NEUTROPHILS # BLD AUTO: 1.6 K/UL (ref 1.8–7.7)
NEUTROPHILS NFR BLD: 39.7 % (ref 38–73)
NRBC BLD-RTO: 0 /100 WBC
PLATELET # BLD AUTO: 259 K/UL (ref 150–450)
PMV BLD AUTO: 9.6 FL (ref 9.2–12.9)
POTASSIUM SERPL-SCNC: 4.3 MMOL/L (ref 3.5–5.1)
PROT SERPL-MCNC: 6.6 G/DL (ref 6–8.4)
RBC # BLD AUTO: 4.25 M/UL (ref 4–5.4)
SODIUM SERPL-SCNC: 138 MMOL/L (ref 136–145)
WBC # BLD AUTO: 3.92 K/UL (ref 3.9–12.7)

## 2024-08-17 PROCEDURE — 25000003 PHARM REV CODE 250: Performed by: NURSE PRACTITIONER

## 2024-08-17 PROCEDURE — 83735 ASSAY OF MAGNESIUM: CPT | Performed by: NURSE PRACTITIONER

## 2024-08-17 PROCEDURE — 25000003 PHARM REV CODE 250: Performed by: INTERNAL MEDICINE

## 2024-08-17 PROCEDURE — 80053 COMPREHEN METABOLIC PANEL: CPT | Performed by: NURSE PRACTITIONER

## 2024-08-17 PROCEDURE — 36415 COLL VENOUS BLD VENIPUNCTURE: CPT | Performed by: NURSE PRACTITIONER

## 2024-08-17 PROCEDURE — 85025 COMPLETE CBC W/AUTO DIFF WBC: CPT | Performed by: NURSE PRACTITIONER

## 2024-08-17 RX ADMIN — DOXYCYCLINE HYCLATE 100 MG: 100 CAPSULE ORAL at 09:08

## 2024-08-17 RX ADMIN — MUPIROCIN 1 G: 20 OINTMENT TOPICAL at 09:08

## 2024-08-17 RX ADMIN — APIXABAN 2.5 MG: 2.5 TABLET, FILM COATED ORAL at 09:08

## 2024-08-17 RX ADMIN — AMIODARONE HYDROCHLORIDE 100 MG: 100 TABLET ORAL at 09:08

## 2024-08-17 NOTE — NURSING
Nurses Note -- 4 Eyes      8/16/2024   9:15 PM      Skin assessed during: Q Shift Change      [x] No Altered Skin Integrity Present    []Prevention Measures Documented      [] Yes- Altered Skin Integrity Present or Discovered   [] LDA Added if Not in Epic (Describe Wound)   [] New Altered Skin Integrity was Present on Admit and Documented in LDA   [] Wound Image Taken    Wound Care Consulted? No    Attending Nurse:  Marleni Henao RN/Staff Member:  Daija Zelaya RN

## 2024-08-17 NOTE — PROGRESS NOTES
INPATIENT NEPHROLOGY CONSULT   [facility] NEPHROLOGY    Tyra Isaac  08/17/2024    Reason for consultation:  esrd    Chief Complaint:   Chief Complaint   Patient presents with    Generalized Weakness     Patient arrived to dialysis with generalized weakness - dialysis completed with 1.5L removed then EMS called for weakness - EMS talked to patient's son and stated that patient has been having generalized weakness and confusion for the past 3 weeks - hx of bradycardia and afib - pt's son states that BP has been difficult to control and has been trying different cardiac medications - currently on metoprolol.     History of Present Illness:     Per H and P  The patient is a 84 year old female with a PMHx of ESRD on HD ([facility] - Dr. Mott), Atrial fibrillation (on Eliquis), HTN, CAD, COPD, previous CVA who presnted to the emergency department for the evaluation of generalized weakness x 3 weeks. Patient was recently evaluated in the emergency department here at [facility] 8/9/24 for the evaluation of shortness of breath with cough and generalized weakness. Chest xray at that time revealed right basilar ground-glass opacities, suspicious for viral/atypical pneumonia vs pulmonary edema. She was discharged home with doxycycline. Prior to that she was admitted here and discharged on 7/19/24 after a admission for bradycardia for which her amiodarone and metoprolol were discontinued per cardiology and she was placed on cardizem. However, upon follow up she was taken off her cardizem and placed back on amiodarone at 100 mg daily by her cardiologist Dr. Thompson. Patient went to HD this am with 1.5L off and patient reported generalized weakness following treatment. EMS was activated and spoke to patient's son who reports that she was been experiencing weakness and confusion over the past three weeks. She has also been having difficulty controlling her blood pressure and has had some medication changes as of recent.      Patient  evaluated in the emergency department - COVID and flu negative. Electrolytes stable. BNP 3400, high sensitivity troponin 226, EKG with normal sinus rhythm, 71 beats per minute with incomplete right bundle-branch block. Septal infarct age undetermined with T-wave abnormality in the inferior and anterior leads. Patient admitted to hospital medicine service for further evaluation    8/14 VSS. Seen on HD today.  8/15 VSS. HD tomorrow. Low BG, needs better DM control.  8/16 VSS. HD today.  8/17  AFVSS, 850 cc net UF yesterday    Plan of Care:    esrd  --continue dialysis per routine  --fluid restrict  --renal dose medication per routine  --continue outpt medication  --continue binders with meals    Anemia of CKD  --erythropoiesis stimulating agent with renal replacement therapy    Secondary HPT  --renal diet  --continue binders    Hypertension  --uf with hd  --fluid restrict  --low salt diet  --continue home medication    Thank you for allowing us to participate in this patient's care. We will continue to follow.    Vital Signs:  Temp Readings from Last 3 Encounters:   08/17/24 98.8 °F (37.1 °C) (Oral)   08/09/24 98.2 °F (36.8 °C) (Oral)   07/30/24 98.6 °F (37 °C)       Pulse Readings from Last 3 Encounters:   08/17/24 64   08/09/24 68   07/30/24 (P) 65       BP Readings from Last 3 Encounters:   08/17/24 (!) 145/62   08/09/24 (!) 144/68   07/30/24 (!) 118/52       Weight:  Wt Readings from Last 3 Encounters:   08/13/24 47.6 kg (104 lb 15 oz)   08/09/24 46.3 kg (102 lb)   07/30/24 44.9 kg (99 lb)       Past Medical & Surgical History:  Past Medical History:   Diagnosis Date    A-fib     Anxiety     Depression     Disorder of kidney and ureter     Encephalopathy acute 1/1/2018    End stage kidney disease 6/17/2017    Gout     Hyperlipidemia     Hypertension     Moderate episode of recurrent major depressive disorder 1/17/2018    Nephropathy hypertensive, stage 5 chronic kidney disease or end stage renal disease 6/17/2017     Obstructive pattern present on pulmonary function testing 7/28/2021    Shows moderate obstruction.    Osteopenia of multiple sites 3/9/2018    Based upon bone density measurements. Patient also has chronic kidney disease.    Stroke 11/2016       Past Surgical History:   Procedure Laterality Date    ANGIOGRAM, CORONARY, WITH LEFT HEART CATHETERIZATION N/A 2/7/2022    Procedure: Angiogram, Coronary, with Left Heart Cath;  Surgeon: Gino Leal MD;  Location: [facility] CATH/EP LAB;  Service: Cardiology;  Laterality: N/A;    CARDIAC SURGERY      stents    EYE SURGERY      WRIST SURGERY         Past Social History:  Social History     Socioeconomic History    Marital status:      Spouse name: Aria Isaac    Number of children: 3   Occupational History    Occupation: Not working   Tobacco Use    Smoking status: Never    Smokeless tobacco: Never   Substance and Sexual Activity    Alcohol use: No    Drug use: No    Sexual activity: Not Currently     Social Determinants of Health     Financial Resource Strain: Low Risk  (8/13/2024)    Overall Financial Resource Strain (CARDIA)     Difficulty of Paying Living Expenses: Not hard at all   Recent Concern: Financial Resource Strain - Medium Risk (7/11/2024)    Overall Financial Resource Strain (CARDIA)     Difficulty of Paying Living Expenses: Somewhat hard   Food Insecurity: No Food Insecurity (8/13/2024)    Hunger Vital Sign     Worried About Running Out of Food in the Last Year: Never true     Ran Out of Food in the Last Year: Never true   Transportation Needs: No Transportation Needs (8/13/2024)    TRANSPORTATION NEEDS     Transportation : No   Physical Activity: Inactive (8/13/2024)    Exercise Vital Sign     Days of Exercise per Week: 0 days     Minutes of Exercise per Session: 0 min   Stress: Patient Unable To Answer (8/13/2024)    [employer] - Occupational Stress Questionnaire     Feeling of Stress : Patient unable to  answer   Recent Concern: Stress - Stress Concern Present (7/11/2024)    [employer] - Occupational Stress Questionnaire     Feeling of Stress : To some extent   Housing Stability: Low Risk  (8/13/2024)    Housing Stability Vital Sign     Unable to Pay for Housing in the Last Year: No     Homeless in the Last Year: No       Medications:  No current facility-administered medications on file prior to encounter.     Current Outpatient Medications on File Prior to Encounter   Medication Sig Dispense Refill    amiodarone (PACERONE) 100 MG Tab Take 1 tablet (100 mg total) by mouth once daily. 30 tablet 0    atorvastatin (LIPITOR) 40 MG tablet Take 40 mg by mouth once daily.      b complex vitamins tablet Take 1 tablet by mouth once daily.      doxycycline (VIBRAMYCIN) 100 MG Cap Take 1 capsule (100 mg total) by mouth 2 (two) times daily. for 10 days 20 capsule 0    ELIQUIS 2.5 mg Tab Take 2.5 mg by mouth 2 (two) times daily.      latanoprost 0.005 % ophthalmic solution Place 1 drop into both eyes every evening.      midodrine (PROAMATINE) 10 MG tablet Take 1 tablet (10 mg total) by mouth 3 (three) times daily with meals. 270 tablet 0    ondansetron (ZOFRAN-ODT) 4 MG TbDL Take 1 tablet (4 mg total) by mouth every 6 (six) hours as needed (nausea). 30 tablet 5    [DISCONTINUED] metoclopramide HCl (REGLAN) 5 MG tablet Take 1 tablet (5 mg total) by mouth 3 (three) times daily before meals. 30 tablet 1    [DISCONTINUED] sodium zirconium cyclosilicate (LOKELMA) 10 gram packet Take 1 packet (10 g total) by mouth every Tuesday, Thursday, Saturday, Sunday. Mix entire contents of packet(s) into drinking glass containing 3 tablespoons of water; stir well and drink immediately. Add water and repeat until no powder remains to receive entire dose. (Patient not taking: Reported on 8/12/2024) 16 packet 0    [DISCONTINUED] VELTASSA 8.4 gram PwPk Take 8.4 g by mouth once daily. (Patient not taking: Reported on  "8/12/2024)       Scheduled Meds:   0.9% NaCl   Intravenous Once    amiodarone  100 mg Oral Daily    apixaban  2.5 mg Oral BID    atorvastatin  40 mg Oral Daily    doxycycline  100 mg Oral BID    epoetin rosy-epbx  50 Units/kg Subcutaneous Every Mon, Wed, Fri    latanoprost  1 drop Both Eyes QHS    mupirocin   Nasal BID    sodium zirconium cyclosilicate  10 g Oral Every Tues, Thurs, Sat, Sun     Continuous Infusions:  PRN Meds:.  Current Facility-Administered Medications:     0.9% NaCl, , Intravenous, PRN    acetaminophen, 650 mg, Oral, Q8H PRN    acetaminophen, 650 mg, Oral, Q4H PRN    aluminum-magnesium hydroxide-simethicone, 30 mL, Oral, QID PRN    dextrose 50%, 12.5 g, Intravenous, PRN    dextrose 50%, 25 g, Intravenous, PRN    glucagon (human recombinant), 1 mg, Intramuscular, PRN    glucose, 16 g, Oral, PRN    glucose, 24 g, Oral, PRN    heparin (porcine), 5,000 Units, Intravenous, PRN    hydrALAZINE, 10 mg, Intravenous, Q6H PRN    melatonin, 6 mg, Oral, Nightly PRN    naloxone, 0.02 mg, Intravenous, PRN    ondansetron, 4 mg, Intravenous, Q6H PRN    sodium chloride 0.9%, 250 mL, Intravenous, PRN    Allergies:  Cyclobenzaprine, Fish containing products, Peanut, and Tramadol    Past Family History:  Reviewed; refer to Hospitalist Admission Note    Review of Systems:  Review of Systems - All 14 systems reviewed and negative, except as noted in HPI    Physical Exam:    BP (!) 145/62 (BP Location: Left arm, Patient Position: Lying)   Pulse 64   Temp 98.8 °F (37.1 °C) (Oral)   Resp 20   Ht 5' 5" (1.651 m)   Wt 47.6 kg (104 lb 15 oz)   SpO2 97%   BMI 17.46 kg/m²     General Appearance:    Alert, cooperative, no distress, appears stated age   Head:    Normocephalic, without obvious abnormality, atraumatic   Eyes:    PER, conjunctiva/corneas clear, EOM's intact in both eyes        Throat:   Lips, mucosa, and tongue normal; teeth and gums normal   Back:     Symmetric, no curvature, ROM normal, no CVA tenderness "   Lungs:     Clear to auscultation bilaterally, respirations unlabored   Chest wall:    No tenderness or deformity   Heart:    Regular rate and rhythm, S1 and S2 normal, no murmur, rub   or gallop   Abdomen:     Soft, non-tender, bowel sounds active all four quadrants,     no masses, no organomegaly   Extremities:   Extremities normal, atraumatic, no cyanosis or edema   Pulses:   2+ and symmetric all extremities   MSK:   No joint or muscle swelling, tenderness or deformity   Skin:   Skin color, texture, turgor normal, no rashes or lesions   Neurologic:   CNII-XII intact, normal strength and sensation       Throughout.  No flap     Results:  Lab Results   Component Value Date     08/17/2024    K 4.3 08/17/2024    CL 98 08/17/2024    CO2 30 (H) 08/17/2024    BUN 34 (H) 08/17/2024    CREATININE 7.3 (H) 08/17/2024    CALCIUM 8.7 08/17/2024    ANIONGAP 10 08/17/2024    ESTGFRAFRICA 4.7 (A) 02/08/2022    EGFRNONAA 4.0 (A) 02/08/2022       Lab Results   Component Value Date    CALCIUM 8.7 08/17/2024    PHOS 3.0 08/13/2024       Recent Labs   Lab 08/17/24  0525   WBC 3.92   RBC 4.25   HGB 11.0*   HCT 36.9*      MCV 87   MCH 25.9*   MCHC 29.8*        Patient care was time spent personally by me on the following activities:   Obtaining a history  Examination of patient.  Providing medical care at the patients bedside.  Developing a treatment plan with patient or surrogate and bedside caregivers  Ordering and reviewing laboratory studies, radiographic studies, pulse oximetry.  Ordering and performing treatments and interventions.  Evaluation of patient's response to treatment.  Discussions with consultants while on the unit and immediately available to the patient.  Re-evaluation of the patient's condition.  Documentation in the medical record.       Jimenez Valero MD  [facility] [place]  [phone]

## 2024-08-17 NOTE — PLAN OF CARE
Problem: Adult Inpatient Plan of Care  Goal: Plan of Care Review  Outcome: Progressing     Problem: Adult Inpatient Plan of Care  Goal: Absence of Hospital-Acquired Illness or Injury  Outcome: Progressing     Problem: Coping Ineffective  Goal: Effective Coping  Outcome: Progressing     Problem: Fall Injury Risk  Goal: Absence of Fall and Fall-Related Injury  Outcome: Progressing     Problem: Skin Injury Risk Increased  Goal: Skin Health and Integrity  Outcome: Progressing     Problem: Hemodialysis  Goal: Absence of Infection Signs and Symptoms  Outcome: Progressing

## 2024-08-17 NOTE — PLAN OF CARE
Problem: Adult Inpatient Plan of Care  Goal: Plan of Care Review  Outcome: Met  Goal: Patient-Specific Goal (Individualized)  Outcome: Met  Goal: Absence of Hospital-Acquired Illness or Injury  Outcome: Met  Goal: Optimal Comfort and Wellbeing  Outcome: Met  Goal: Readiness for Transition of Care  Outcome: Met     Problem: Coping Ineffective  Goal: Effective Coping  Outcome: Met     Problem: Fall Injury Risk  Goal: Absence of Fall and Fall-Related Injury  Outcome: Met     Problem: Skin Injury Risk Increased  Goal: Skin Health and Integrity  Outcome: Met     Problem: Wound  Goal: Optimal Coping  Outcome: Met  Goal: Optimal Functional Ability  Outcome: Met  Goal: Absence of Infection Signs and Symptoms  Outcome: Met  Goal: Improved Oral Intake  Outcome: Met  Goal: Optimal Pain Control and Function  Outcome: Met  Goal: Skin Health and Integrity  Outcome: Met  Goal: Optimal Wound Healing  Outcome: Met     Problem: Hemodialysis  Goal: Safe, Effective Therapy Delivery  Outcome: Met  Goal: Effective Tissue Perfusion  Outcome: Met  Goal: Absence of Infection Signs and Symptoms  Outcome: Met

## 2024-08-17 NOTE — PLAN OF CARE
VICENTE sent COREY order to [facility] and hospice consult to [facility]. Per Sherri at [facility] she spoke with pt's son, and will meet with family once they are d/c.       08/17/24 0855   Post-Acute Status   Post-Acute Authorization Home Health;Hospice   Home Health Status Set-up Complete/Auth obtained   Hospice Status Referrals Sent   Discharge Plan   Discharge Plan A Home Health   Discharge Plan B Hospice/home

## 2024-08-17 NOTE — PLAN OF CARE
Patient cleared for discharge from case management standpoint.    Follow up appointments scheduled and added to AVS ([facility]). Start of  Care date will be 8/18/24.    Chart and discharge orders reviewed.  Patient discharged home with no further case management needs.       08/17/24 0941   Final Note   Assessment Type Final Discharge Note   Anticipated Discharge Disposition Home-Health   What phone number can be called within the next 1-3 days to see how you are doing after discharge? [phone]   Hospital Resources/Appts/Education Provided Appointments scheduled and added to AVS   Post-Acute Status   Post-Acute Authorization Home Health   Home Health Status Set-up Complete/Auth obtained   Coverage HU[employer] MANAGED MEDICARE - HU[employer] MEDICARE HMO - CAPITATED   Patient choice form signed by patient/caregiver List with quality metrics by geographic area provided   Discharge Delays None known at this time

## 2024-08-18 NOTE — PROGRESS NOTES
Subjective:       Patient ID: Tyra Isaac is a 84 y.o. female.    Chief Complaint: Hospital Follow Up, Chronic Kidney Disease, and Atrial Fibrillation      Patient is a chronically ill  female who has end-stage kidney disease and is on chronic hemodialysis.    She was recently hospitalized again and this is probably her 6th hospitalization in the last 9 months.      Patient and family are unable to keep track on the details of each hospitalization as to what is precipitating the issues.      Her last discharge summary is not in the epic system and it is difficult to track the turn of events which led to hospitalization amidst significant bloat notes. It was becoming increasingly difficult to differentiate between each hospitalization as the reasons become more similar and blurred     Patient's son talks about some issue with medications and I am not sure as to which medications.      She was on doxycycline for potential pneumonia previously and the hospital notes indicate that she should be continued with doxycycline.  Patient is afebrile and has no symptoms.  She did not get any doxycycline in the hospital though.  I have advised the family not to give any more doxycycline.      There seems to be some issue with fluid overload and elevated BNP also.       Sometimes it is difficult for me as a primary care physician to differentiate if her hospitalization indicates a aggressive diuresis or she was still in fluid overload.      Patient's son also asks for a Biju lift at this point.  Given her weakness it is difficult to transfer.  I did show a video clip of how a Biju lift works and son would like to defer for the next few days and see if miss Isaac regains her independent strength      Her  underlying medical issues are as below:  -.    1. Paroxysmal atrial fibrillation -rapid ventricular response and recent hospitalization subsequently Jaya arrhythmias  2. Benign hypertension with ESRD  (end-stage renal disease)   3. Multiple-type hyperlipidemia   4. Stage 5 chronic kidney disease on chronic dialysis   5. Chronic anticoagulation   6. 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 Other gastritis without hemorrhage, unspecified chronicity   8. History of syncope   9.          Cognitive changes consistent with the age, station of life and medical issues.  10.        General debility and poor performance     11.        Chronic nausea and vomiting suspected gastroparesis -getting better              Transitional care management        1.-Educate the family, guardian or caregiver.  Family and or caretaker present at visit.      Reviewed medications and advancing state of her medical illnesses.      2.-Review the patient's need for, or follow-up on, diagnostic tests and treatments.  Diagnostic tests reviewed/disposition      Continue to follow up with the dialysis.      3.-review discharge information.  (for example, discharge summary or continuity of care documents). Disease/illness education      Continue to follow Cardiology.        4.-Help schedule required community providers and services follow-up.  Home health/community services discussion/referrals.      Already has follow up with the dialysis '    5.-establish or reestablish referrals and arranged needed community resources.  Establishment or reestablishment of referral orders for community resources.      Follow up with Nephrology. Keep prior appointments with Cardiology and GI.        6..-interact with other healthcare professionals who may assume or reassume care of the patient's system-specific problems.  Discuss with other healthcare providers.      Will check with home health about her situation and strength and if higher as needed.            Hypertension  This is a chronic problem. The current episode started more than 1 year ago. The problem is controlled. Associated symptoms  include chest pain, malaise/fatigue and shortness of breath. Pertinent negatives include no headaches, neck pain, palpitations or peripheral edema. Risk factors for coronary artery disease include sedentary lifestyle, dyslipidemia and post-menopausal state. Past treatments include calcium channel blockers. The current treatment provides moderate improvement. Compliance problems include psychosocial issues.  Hypertensive end-organ damage includes kidney disease and CAD/MI. Identifiable causes of hypertension include chronic renal disease.   Hyperlipidemia  This is a chronic problem. The current episode started more than 1 year ago. The problem is controlled. Exacerbating diseases include chronic renal disease. She has no history of hypothyroidism or obesity. Associated symptoms include chest pain, myalgias and shortness of breath. Current antihyperlipidemic treatment includes statins. The current treatment provides moderate improvement of lipids. Risk factors for coronary artery disease include a sedentary lifestyle and dyslipidemia.   Atrial Fibrillation  Presents for follow-up (recent hospitalization for bradyarrhythmia) visit. Symptoms include bradycardia, chest pain, hemodynamic instability, hypertension, shortness of breath and weakness. Symptoms are negative for dizziness, pacemaker problem, palpitations, syncope and tachycardia. The symptoms have been stable. Medication compliance problems include psychosocial issues.   Coronary Artery Disease  Presents for follow-up visit. Symptoms include chest pain and shortness of breath. Pertinent negatives include no chest tightness, dizziness, leg swelling or palpitations. Risk factors include hypertension. Risk factors do not include obesity. The symptoms have been stable. Compliance with diet is variable. Compliance with exercise is poor. Compliance with medications is good.       Past Medical History:   Diagnosis Date    A-fib     Anxiety     Depression     Disorder  of kidney and ureter     Encephalopathy acute 1/1/2018    End stage kidney disease 6/17/2017    Gout     Hyperlipidemia     Hypertension     Moderate episode of recurrent major depressive disorder 1/17/2018    Nephropathy hypertensive, 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    Osteopenia of multiple sites 3/9/2018    Based upon bone density measurements. Patient also has chronic kidney disease.    Stroke 11/2016     Social History     Socioeconomic History    Marital status:      Spouse name: Aria Isaac    Number of children: 3   Occupational History    Occupation: Not working   Tobacco Use    Smoking status: Never    Smokeless tobacco: Never   Substance and Sexual Activity    Alcohol use: No    Drug use: No    Sexual activity: Not Currently     Social Determinants of Health     Financial Resource Strain: Low Risk  (8/13/2024)    Overall Financial Resource Strain (CARDIA)     Difficulty of Paying Living Expenses: Not hard at all   Recent Concern: Financial Resource Strain - Medium Risk (7/11/2024)    Overall Financial Resource Strain (CARDIA)     Difficulty of Paying Living Expenses: Somewhat hard   Food Insecurity: No Food Insecurity (8/13/2024)    Hunger Vital Sign     Worried About Running Out of Food in the Last Year: Never true     Ran Out of Food in the Last Year: Never true   Transportation Needs: No Transportation Needs (8/13/2024)    TRANSPORTATION NEEDS     Transportation : No   Physical Activity: Inactive (8/13/2024)    Exercise Vital Sign     Days of Exercise per Week: 0 days     Minutes of Exercise per Session: 0 min   Stress: Stress Concern Present (8/20/2024)    [employer] [place] [employer] - Occupational Stress Questionnaire     Feeling of Stress : Rather much   Housing Stability: Low Risk  (8/13/2024)    Housing Stability Vital Sign      "Unable to Pay for Housing in the Last Year: No     Homeless in the Last Year: No     Past Surgical History:   Procedure Laterality Date    ANGIOGRAM, CORONARY, WITH LEFT HEART CATHETERIZATION N/A 2/7/2022    Procedure: Angiogram, Coronary, with Left Heart Cath;  Surgeon: Gino Leal MD;  Location: [facility] CATH/EP LAB;  Service: Cardiology;  Laterality: N/A;    CARDIAC SURGERY      stents    EYE SURGERY      WRIST SURGERY       Family History   Problem Relation Name Age of Onset    Heart disease Mother      Cancer Father         Review of Systems   Constitutional:  Positive for activity change, appetite change, fatigue and malaise/fatigue.   HENT:  Negative for congestion, sinus pain and sneezing.    Eyes:  Negative for discharge and redness.   Respiratory:  Positive for shortness of breath. Negative for apnea, chest tightness and wheezing.    Cardiovascular:  Positive for chest pain. Negative for palpitations, leg swelling and syncope.   Gastrointestinal:  Positive for abdominal distention, nausea and vomiting. Negative for blood in stool.   Endocrine: Negative for cold intolerance, polydipsia and polyuria.   Genitourinary:  Negative for difficulty urinating, flank pain and pelvic pain.   Musculoskeletal:  Positive for gait problem and myalgias. Negative for neck pain.   Neurological:  Positive for weakness. Negative for dizziness, seizures, syncope, speech difficulty and headaches.   Psychiatric/Behavioral:  Positive for decreased concentration and sleep disturbance. The patient is nervous/anxious.          Objective:      Blood pressure (!) 131/53, pulse 63, height 5' 5" (1.651 m). Body mass index is 17.46 kg/m².  Physical Exam  Vitals and nursing note reviewed.   Constitutional:       General: She is not in acute distress.     Appearance: She is well-developed. She is ill-appearing. She is not toxic-appearing or diaphoretic.      Comments: BMI 16.47 somewhat thin built and chronically ill appearing.    HENT: "      Head: Normocephalic and atraumatic.   Eyes:      General: No scleral icterus.  Neck:      Thyroid: No thyromegaly.      Vascular: No JVD.      Trachea: Trachea normal. No tracheal deviation.   Cardiovascular:      Rate and Rhythm: Normal rate. Rhythm irregular.      Heart sounds: S1 normal and S2 normal. Murmur heard.      Systolic murmur is present with a grade of 2/6.      No friction rub.      Comments:  Previous cardiologist Dr. Thompson.  Current cardiologist to be confirmed  Pulmonary:      Effort: No respiratory distress.      Comments: Diminished air entry and some harsh conducted sounds.  No definite Creps or rhonchi.  Abdominal:      General: There is no distension.      Palpations: Abdomen is soft. Abdomen is not rigid.      Tenderness: There is no abdominal tenderness. There is no guarding.   Musculoskeletal:         General: No tenderness or deformity.        Arms:       Cervical back: Neck supple.      Right lower leg: No edema.      Left lower leg: No edema.   Lymphadenopathy:      Cervical: No cervical adenopathy.   Skin:     General: Skin is warm.      Coloration: Skin is not pale.      Findings: No bruising or rash.      Comments: Rt arm AV shunt with thrill   Neurological:      Mental Status: She is alert. Mental status is at baseline.      Coordination: Coordination normal.   Psychiatric:         Mood and Affect: Affect is not labile.         Speech: Speech normal.         Behavior: Behavior normal. Behavior is cooperative.         Cognition and Memory: Cognition is impaired (Difficulty with details of her medications and issues).         Judgment: Judgment is not inappropriate.           Assessment:       No results displayed because visit has over 200 results.      Admission on 08/09/2024, Discharged on 08/09/2024   Component Date Value Ref Range Status    WBC 08/09/2024 4.72  3.90 - 12.70 K/uL Final    RBC 08/09/2024 4.68  4.00 - 5.40 M/uL Final    Hemoglobin 08/09/2024 11.9 (L)  12.0 -  16.0 g/dL Final    Hematocrit 08/09/2024 41.6  37.0 - 48.5 % Final    MCV 08/09/2024 89  82 - 98 fL Final    MCH 08/09/2024 25.4 (L)  27.0 - 31.0 pg Final    MCHC 08/09/2024 28.6 (L)  32.0 - 36.0 g/dL Final    RDW 08/09/2024 21.3 (H)  11.5 - 14.5 % Final    Platelets 08/09/2024 261  150 - 450 K/uL Final    MPV 08/09/2024 9.8  9.2 - 12.9 fL Final    Immature Granulocytes 08/09/2024 0.2  0.0 - 0.5 % Final    Gran # (ANC) 08/09/2024 2.2  1.8 - 7.7 K/uL Final    Immature Grans (Abs) 08/09/2024 0.01  0.00 - 0.04 K/uL Final    Lymph # 08/09/2024 1.2  1.0 - 4.8 K/uL Final    Mono # 08/09/2024 0.8  0.3 - 1.0 K/uL Final    Eos # 08/09/2024 0.5  0.0 - 0.5 K/uL Final    Baso # 08/09/2024 0.03  0.00 - 0.20 K/uL Final    nRBC 08/09/2024 0  0 /100 WBC Final    Gran % 08/09/2024 47.5  38.0 - 73.0 % Final    Lymph % 08/09/2024 24.4  18.0 - 48.0 % Final    Mono % 08/09/2024 16.3 (H)  4.0 - 15.0 % Final    Eosinophil % 08/09/2024 11.0 (H)  0.0 - 8.0 % Final    Basophil % 08/09/2024 0.6  0.0 - 1.9 % Final    Differential Method 08/09/2024 Automated   Final    Sodium 08/09/2024 139  136 - 145 mmol/L Final    Potassium 08/09/2024 3.4 (L)  3.5 - 5.1 mmol/L Final    Chloride 08/09/2024 94 (L)  95 - 110 mmol/L Final    CO2 08/09/2024 36 (H)  23 - 29 mmol/L Final    Glucose 08/09/2024 83  70 - 110 mg/dL Final    BUN 08/09/2024 14  8 - 23 mg/dL Final    Creatinine 08/09/2024 4.2 (H)  0.5 - 1.4 mg/dL Final    Calcium 08/09/2024 9.1  8.7 - 10.5 mg/dL Final    Total Protein 08/09/2024 8.4  6.0 - 8.4 g/dL Final    Albumin 08/09/2024 4.4  3.5 - 5.2 g/dL Final    Total Bilirubin 08/09/2024 0.6  0.1 - 1.0 mg/dL Final    Alkaline Phosphatase 08/09/2024 74  55 - 135 U/L Final    AST 08/09/2024 15  10 - 40 U/L Final    ALT 08/09/2024 12  10 - 44 U/L Final    eGFR 08/09/2024 9.9 (A)  >60 mL/min/1.73 m^2 Final    Anion Gap 08/09/2024 9  8 - 16 mmol/L Final    Magnesium 08/09/2024 1.8  1.6 - 2.6 mg/dL Final    QRS Duration 08/12/2024 92  ms Final    [facility]  QTC Calculation 08/12/2024 458  ms Final    Phosphorus 08/09/2024 1.7 (L)  2.7 - 4.5 mg/dL Final    Troponin I High Sensitivity 08/09/2024 34.9 (H)  0.0 - 14.9 pg/mL Final    SARS-CoV-2 RNA, Amplification, Qual 08/09/2024 Negative  Negative Final    Influenza A, Molecular 08/09/2024 Negative  Negative Final    Influenza B, Molecular 08/09/2024 Negative  Negative Final    Flu A & B Source 08/09/2024 Nasal swab   Final    BNP 08/09/2024 1,130 (H)  0 - 99 pg/mL Final   No results displayed because visit has over 200 results.      No results displayed because visit has over 200 results.      Admission on 06/03/2024, Discharged on 06/03/2024   Component Date Value Ref Range Status    WBC 06/03/2024 7.00  3.90 - 12.70 K/uL Final    RBC 06/03/2024 4.43  4.00 - 5.40 M/uL Final    Hemoglobin 06/03/2024 11.8 (L)  12.0 - 16.0 g/dL Final    Hematocrit 06/03/2024 40.2  37.0 - 48.5 % Final    MCV 06/03/2024 91  82 - 98 fL Final    MCH 06/03/2024 26.6 (L)  27.0 - 31.0 pg Final    MCHC 06/03/2024 29.4 (L)  32.0 - 36.0 g/dL Final    RDW 06/03/2024 18.2 (H)  11.5 - 14.5 % Final    Platelets 06/03/2024 226  150 - 450 K/uL Final    MPV 06/03/2024 9.7  9.2 - 12.9 fL Final    Immature Granulocytes 06/03/2024 0.3  0.0 - 0.5 % Final    Gran # (ANC) 06/03/2024 3.9  1.8 - 7.7 K/uL Final    Immature Grans (Abs) 06/03/2024 0.02  0.00 - 0.04 K/uL Final    Lymph # 06/03/2024 1.4  1.0 - 4.8 K/uL Final    Mono # 06/03/2024 1.1 (H)  0.3 - 1.0 K/uL Final    Eos # 06/03/2024 0.6 (H)  0.0 - 0.5 K/uL Final    Baso # 06/03/2024 0.02  0.00 - 0.20 K/uL Final    nRBC 06/03/2024 0  0 /100 WBC Final    Gran % 06/03/2024 55.4  38.0 - 73.0 % Final    Lymph % 06/03/2024 19.3  18.0 - 48.0 % Final    Mono % 06/03/2024 16.1 (H)  4.0 - 15.0 % Final    Eosinophil % 06/03/2024 8.6 (H)  0.0 - 8.0 % Final    Basophil % 06/03/2024 0.3  0.0 - 1.9 % Final    Differential Method 06/03/2024 Automated   Final    Sodium 06/03/2024 142  136 - 145 mmol/L Final    Potassium  06/03/2024 3.7  3.5 - 5.1 mmol/L Final    Chloride 06/03/2024 98  95 - 110 mmol/L Final    CO2 06/03/2024 32 (H)  23 - 29 mmol/L Final    Glucose 06/03/2024 62 (L)  70 - 110 mg/dL Final    BUN 06/03/2024 34 (H)  8 - 23 mg/dL Final    Creatinine 06/03/2024 6.0 (H)  0.5 - 1.4 mg/dL Final    Calcium 06/03/2024 9.0  8.7 - 10.5 mg/dL Final    Total Protein 06/03/2024 7.7  6.0 - 8.4 g/dL Final    Albumin 06/03/2024 4.4  3.5 - 5.2 g/dL Final    Total Bilirubin 06/03/2024 0.7  0.1 - 1.0 mg/dL Final    Alkaline Phosphatase 06/03/2024 63  55 - 135 U/L Final    AST 06/03/2024 28  10 - 40 U/L Final    ALT 06/03/2024 13  10 - 44 U/L Final    eGFR 06/03/2024 6.5 (A)  >60 mL/min/1.73 m^2 Final    Anion Gap 06/03/2024 12  8 - 16 mmol/L Final    Magnesium 06/03/2024 2.0  1.6 - 2.6 mg/dL Final    QRS Duration 06/03/2024 76  ms Final    [facility]HS QTC Calculation 06/03/2024 524  ms Final    Specimen UA 06/03/2024 Urine, Clean Catch   Final    Color, UA 06/03/2024 Yellow  Yellow, Straw, Mckayla Final    Appearance, UA 06/03/2024 Clear  Clear Final    pH, UA 06/03/2024 8.0  5.0 - 8.0 Final    Specific Gravity, UA 06/03/2024 1.010  1.005 - 1.030 Final    Protein, UA 06/03/2024 Negative  Negative Final    Glucose, UA 06/03/2024 Negative  Negative Final    Ketones, UA 06/03/2024 Negative  Negative Final    Bilirubin (UA) 06/03/2024 Negative  Negative Final    Occult Blood UA 06/03/2024 Negative  Negative Final    Nitrite, UA 06/03/2024 Negative  Negative Final    Urobilinogen, UA 06/03/2024 Negative  Negative EU/dL Final    Leukocytes, UA 06/03/2024 Negative  Negative Final    POC Glucose 06/03/2024 71  70 - 110 Final    POC Glucose 06/03/2024 65 (L)  70 - 110 Final    POC Glucose 06/03/2024 107  70 - 110 Final     Narrative & Impression  EXAMINATION:  CT HEAD WITHOUT CONTRAST     CLINICAL HISTORY:  Mental status change, unknown cause;     TECHNIQUE:  5 mm low dose non-contrast contiguous axial images were acquired through the head.  Subsequently,  2 dimensional coronal and sagittal reconstructed images were generated from the source data.     COMPARISON:  MRI brain-11/11/2022     FINDINGS:  The brain is normally formed with preserved gray-white matter junction differentiation. No evidence of acute/recent major vascular territory cerebral infarction, parenchymal hemorrhage, or intra-axial mass.  There are confluent areas of periventricular white matter hypoattenuation compatible with age-appropriate chronic microvascular ischemic changes.  There is age-appropriate cerebral volume loss with widening of the CSF spaces over both cerebral convexities.  There is a small focus of remote lacunar infarction present within the lateral aspect of the left caudate head, similar to the prior examination.  There are small foci of remote lacunar infarction within the left basal ganglia, unchanged.     No hydrocephalus.  No effacement of the skull-base cisterns.  No extra-axial fluid collections or blood products.     There is minimal mucoperiosteal thickening noted within the dependent right maxillary sinus.  The visualized orbits are unremarkable.  The bony calvarium and visualized facial bones show no acute abnormality.     Impression:     1. No acute intracranial abnormality appreciated.  No interval detrimental change in the imaging appearance of the brain when compared with the prior MRI of the brain performed 11/11/2022.  2. Minimal right maxillary sinus disease.  3. Age-appropriate supratentorial cerebral volume loss and chronic microvascular ischemic changes within the periventricular white matter of the supratentorial brain.  4. Small foci of remote lacunar infarction within the lateral aspect of the left caudate head and left basal ganglia, unchanged when compared to the prior study..        Electronically signed by:Eyad Kimball MD  Date:                                            08/12/2024  Time:                                           17:32           Exam  Ended: 08/12/24 17:13 CDT Last Resulted: 08/12/24 17:32 CDT             1. 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 Paroxysmal atrial fibrillation    3. Coronary artery disease of native heart with stable angina pectoris, unspecified vessel or lesion type    4. Mild cognitive impairment    5. Nausea and vomiting, unspecified vomiting type    6. Frail elderly    7. General weakness             Plan:   Stage 5 chronic kidney disease on chronic dialysis    Paroxysmal atrial fibrillation    Coronary artery disease of native heart with stable angina pectoris, unspecified vessel or lesion type    Mild cognitive impairment    Nausea and vomiting, unspecified vomiting type    Frail elderly    General weakness    Overall it seems miss Mary is doing the best thus far after recent hospitalization.    The reason for her frequent hospitalization has been as variable as probably dehydration, probably over dialysis, weakness, suspected pneumonia.      With the each hospitalization the distinction between the previous hospitalization becomes more blurred and difficult to differentiate as to if she was fluid overloaded, having infection or pneumonia or altered mental status or encephalopathy.    She has not had antibiotics which was prescribed after her last discharge but probably were discontinued when she was rehospitalized.  I do not feel any necessity.  She does not have any cough.  She does not have any chest pain or pleuritic chest pain.  She does not have any expectoration.    Son states that it he is finding it difficult to lift her out of the bed and we would like to have a higher lift.  Home health has ordered that.    We did discuss about the higher lift and I showed them a video and how it works.  Patient's son Mr. Isaac will look into her strength and ability to do things on her own in the next 3-4 days and he will notify me accordingly.  I will be certainly happy to sign off the are lift at that  point.      While in past we had discussion about shifting more towards palliative or hospice side, at this point family feels that she was doing better and will continue with the full and conventional care.    Her overall condition and prognosis is extremely modest and tipping point is not too far for rehospitalization which I expect will occur more in future.      Overall prognosis guarded to poor.    Follow up in about 15 days (around 9/4/2024), or if symptoms worsen or fail to improve, for cKD.      Current Outpatient Medications:     amiodarone (PACERONE) 100 MG Tab, Take 1 tablet (100 mg total) by mouth once daily., Disp: 30 tablet, Rfl: 0    atorvastatin (LIPITOR) 40 MG tablet, Take 40 mg by mouth once daily., Disp: , Rfl:     b complex vitamins tablet, Take 1 tablet by mouth once daily., Disp: , Rfl:     ELIQUIS 2.5 mg Tab, Take 2.5 mg by mouth 2 (two) times daily., Disp: , Rfl:     latanoprost 0.005 % ophthalmic solution, Place 1 drop into both eyes every evening., Disp: , Rfl:     midodrine (PROAMATINE) 10 MG tablet, Take 1 tablet (10 mg total) by mouth 3 (three) times daily with meals., Disp: 270 tablet, Rfl: 0    ondansetron (ZOFRAN-ODT) 4 MG TbDL, Take 1 tablet (4 mg total) by mouth every 6 (six) hours as needed (nausea)., Disp: 30 tablet, Rfl: 5    Kalpesh Manasa

## 2024-08-19 ENCOUNTER — TELEPHONE (OUTPATIENT)
Dept: FAMILY MEDICINE | Facility: CLINIC | Age: 84
End: 2024-08-19
Payer: MEDICARE

## 2024-08-19 ENCOUNTER — PATIENT OUTREACH (OUTPATIENT)
Dept: ADMINISTRATIVE | Facility: CLINIC | Age: 84
End: 2024-08-19
Payer: MEDICARE

## 2024-08-19 ENCOUNTER — DOCUMENT SCAN (OUTPATIENT)
Dept: HOME HEALTH SERVICES | Facility: HOSPITAL | Age: 84
End: 2024-08-19
Payer: MEDICARE

## 2024-08-19 ENCOUNTER — PATIENT OUTREACH (OUTPATIENT)
Dept: FAMILY MEDICINE | Facility: CLINIC | Age: 84
End: 2024-08-19
Payer: MEDICARE

## 2024-08-19 NOTE — TELEPHONE ENCOUNTER
Discharge Information     Discharge Date:  8/18/2024     Primary Discharge Diagnosis:  weakness      Discharge Summary:  Reviewed      Medication & Order Review     Were medication changes made or new medications added?   Yes    If so, has the patient filled the prescriptions?  Yes     Was Home Health ordered? Yes    If so, has Home Health contacted patient and/or initiated services?  Yes    Name of Home Health Agency? N/A    Durable Medical Equipment ordered?  No     If so, has the DME provider contacted patient and delivered equipment?  N/A    Follow Up               Any problems since discharge? No    How is the patient feeling since returning home?      Have you set up recommended follow up appointments?  (cardiology, surgery, etc.)    Schedule Hospital Follow-up appointment within 7-14 days (preferably 7).  Appt made     Notes:  feels some what better             Kaci Celeste

## 2024-08-19 NOTE — TELEPHONE ENCOUNTER
----- Message from Renetta Owen LPN sent at 8/19/2024 12:17 PM CDT -----  Regarding: HFU  Call patient - needs post-hospital phone call within 2 business days and hospital follow up visit scheduled within 7-14 days.

## 2024-08-19 NOTE — PROGRESS NOTES
C3 nurse spoke with Tyra Isaac's son Raffi for a TCC post hospital discharge follow up call. The patient has a scheduled HOSFU appointment with Kalpesh Goel MD  on 8/20/24 @ 1020.

## 2024-08-20 ENCOUNTER — PATIENT MESSAGE (OUTPATIENT)
Dept: FAMILY MEDICINE | Facility: CLINIC | Age: 84
End: 2024-08-20

## 2024-08-20 ENCOUNTER — OFFICE VISIT (OUTPATIENT)
Dept: FAMILY MEDICINE | Facility: CLINIC | Age: 84
End: 2024-08-20
Payer: MEDICARE

## 2024-08-20 VITALS
BODY MASS INDEX: 17.46 KG/M2 | DIASTOLIC BLOOD PRESSURE: 53 MMHG | HEART RATE: 63 BPM | HEIGHT: 65 IN | SYSTOLIC BLOOD PRESSURE: 131 MMHG

## 2024-08-20 DIAGNOSIS — Z99.2 STAGE 5 CHRONIC KIDNEY DISEASE ON CHRONIC DIALYSIS: Primary | ICD-10-CM

## 2024-08-20 DIAGNOSIS — G31.84 MILD COGNITIVE IMPAIRMENT: ICD-10-CM

## 2024-08-20 DIAGNOSIS — R54 FRAIL ELDERLY: ICD-10-CM

## 2024-08-20 DIAGNOSIS — I48.0 PAROXYSMAL ATRIAL FIBRILLATION: ICD-10-CM

## 2024-08-20 DIAGNOSIS — R11.2 NAUSEA AND VOMITING, UNSPECIFIED VOMITING TYPE: ICD-10-CM

## 2024-08-20 DIAGNOSIS — N18.6 STAGE 5 CHRONIC KIDNEY DISEASE ON CHRONIC DIALYSIS: Primary | ICD-10-CM

## 2024-08-20 DIAGNOSIS — R53.1 GENERAL WEAKNESS: ICD-10-CM

## 2024-08-20 DIAGNOSIS — I25.118 CORONARY ARTERY DISEASE OF NATIVE HEART WITH STABLE ANGINA PECTORIS, UNSPECIFIED VESSEL OR LESION TYPE: ICD-10-CM

## 2024-08-20 PROCEDURE — 1100F PTFALLS ASSESS-DOCD GE2>/YR: CPT | Mod: HCNC,CPTII,S$GLB, | Performed by: INTERNAL MEDICINE

## 2024-08-20 PROCEDURE — 3078F DIAST BP <80 MM HG: CPT | Mod: HCNC,CPTII,S$GLB, | Performed by: INTERNAL MEDICINE

## 2024-08-20 PROCEDURE — 1126F AMNT PAIN NOTED NONE PRSNT: CPT | Mod: HCNC,CPTII,S$GLB, | Performed by: INTERNAL MEDICINE

## 2024-08-20 PROCEDURE — 1111F DSCHRG MED/CURRENT MED MERGE: CPT | Mod: HCNC,CPTII,S$GLB, | Performed by: INTERNAL MEDICINE

## 2024-08-20 PROCEDURE — 99496 TRANSJ CARE MGMT HIGH F2F 7D: CPT | Mod: HCNC,S$GLB,, | Performed by: INTERNAL MEDICINE

## 2024-08-20 PROCEDURE — 99999 PR PBB SHADOW E&M-EST. PATIENT-LVL III: CPT | Mod: PBBFAC,HCNC,, | Performed by: INTERNAL MEDICINE

## 2024-08-20 PROCEDURE — 3075F SYST BP GE 130 - 139MM HG: CPT | Mod: HCNC,CPTII,S$GLB, | Performed by: INTERNAL MEDICINE

## 2024-08-20 PROCEDURE — 1159F MED LIST DOCD IN RCRD: CPT | Mod: HCNC,CPTII,S$GLB, | Performed by: INTERNAL MEDICINE

## 2024-08-20 PROCEDURE — 3288F FALL RISK ASSESSMENT DOCD: CPT | Mod: HCNC,CPTII,S$GLB, | Performed by: INTERNAL MEDICINE

## 2024-08-20 PROCEDURE — 1160F RVW MEDS BY RX/DR IN RCRD: CPT | Mod: HCNC,CPTII,S$GLB, | Performed by: INTERNAL MEDICINE

## 2024-08-23 NOTE — DISCHARGE SUMMARY
[facility], [facility]  Discharge Summary      Patient Name: Tyra Isaac  MRN: [id number]  [facility]: [id number]  Patient Class: IP- Inpatient  Admission Date: 8/12/2024  Hospital Length of Stay: 4 days  Discharge Date and Time: 8/17/2024  2:30 PM  Attending Physician: No att. providers found   Discharging Provider: KELBY Le  Primary Care Provider: Kalpesh Goel MD    Primary Care Team: Networked reference to record PCT     HPI:   The patient is a 84 year old female with a PMHx of ESRD on HD ([facility] - Dr. Mott), Atrial fibrillation (on Eliquis), HTN, CAD, COPD, previous CVA who presnted to the emergency department for the evaluation of generalized weakness x 3 weeks. Patient was recently evaluated in the emergency department here at [facility] 8/9/24 for the evaluation of shortness of breath with cough and generalized weakness. Chest xray at that time revealed right basilar ground-glass opacities, suspicious for viral/atypical pneumonia vs pulmonary edema. She was discharged home with doxycycline. Prior to that she was admitted here and discharged on 7/19/24 after a admission for bradycardia for which her amiodarone and metoprolol were discontinued per cardiology and she was placed on cardizem. However, upon follow up she was taken off her cardizem and placed back on amiodarone at 100 mg daily by her cardiologist Dr. Thompson. Patient went to HD this am with 1.5L off and patient reported generalized weakness following treatment. EMS was activated and spoke to patient's son who reports that she was been experiencing weakness and confusion over the past three weeks. She has also been having difficulty controlling her blood pressure and has had some medication changes as of recent.     Patient evaluated in the emergency department - COVID and flu negative. Electrolytes stable. BNP 3400, high sensitivity troponin 226, EKG with normal sinus rhythm, 71 beats per minute with incomplete right  bundle-branch block. Septal infarct age undetermined with T-wave abnormality in the inferior and anterior leads. Patient admitted to hospital medicine service for further evaluation.           * No surgery found *      Hospital Course:   84-year-old female admitted with confusion, weakness, tremors.  Patient has remained hemodynamically stable.  CBC with mild anemia secondary for ESRD, CMP with stable electrolytes and consistent with ESRD.  Tropnin trended down from 226 on admit to 170 likely 2/2 demand ischemia from fluid overload/ESRD.  CT head without contrast with no interval changes from previous MRI in 11/2022, chronic age-related changes, small foci of lacunar infarction that is stable from previous imaging.  Per further chart review, patient was prescribed Reglan for possible gastroparesis in July 2024.  Reglan was discontinued as it may be contributing to her tremors as well as facial movements and confusion.  Maintained on HD schedule per nephrology.  PT/OT consulted.  Palliative care team consulted to discuss possible comfort/hospice care.  Family opted to proceed with full code and care as of now.  Patient continued dialysis while inpatient, only 850 mL able to be removed due to hypotension during most recent session.  Patient remained hypotensive after dialysis, but BP stabilized overnight.  She was seen and examined on date of discharge and was appropriate for discharge to home health.  Pt's son agreed with discharge plan.     Goals of Care Treatment Preferences:  Code Status: Full Code          What is most important right now is to focus on symptom/pain control, quality of life, even if it means sacrificing a little time, improvement in condition but with limits to invasive therapies.  Accordingly, we have decided that the best plan to meet the patient's goals includes continuing with treatment.         Consults:   Consults (From admission, onward)          Status Ordering Provider     Inpatient  consult to Palliative Care  Once        Provider:  Jaziel Conroy MD    Completed JACKI MCCORMICK     Inpatient consult to Nephrology  Once        Provider:  Jimenez Valero MD    Completed JACKI MCCORMICK     Inpatient consult to Cardiology  Once        Provider:  Heather Smith MD    Completed AKHIL SHULTZ            No new Assessment & Plan notes have been filed under this hospital service since the last note was generated.  Service: Hospital Medicine    Final Active Diagnoses:    Diagnosis Date Noted POA    PRINCIPAL PROBLEM:  Acute metabolic encephalopathy [G93.41] 08/12/2024 Yes    Goals of care, counseling/discussion [Z71.89] 07/01/2024 Not Applicable    ESRD (end stage renal disease) [N18.6] 12/15/2023 Yes    Demand ischemia [I24.89] 01/27/2022 Yes    Atrial fibrillation [I48.91] 12/13/2019 Yes    Benign hypertension with CKD (chronic kidney disease) stage V [I12.0, N18.5] 03/28/2018 Yes      Problems Resolved During this Admission:       Discharged Condition: stable    [place] [facility]    Follow Up:   Follow-up Information       Kalpesh Goel MD. Go on 8/20/2024.    Specialty: Internal Medicine  Why: @10:20am  Contact information:  [address]  [address]  [place] [address]  [phone]               Kalpesh Goel MD. Go on 9/4/2024.    Specialty: Internal Medicine  Why: @11:20am  Contact information:  [address]  [address]  [place] [address]  [phone]               Lissy Silva MD. Go on 10/30/2024.    Specialties: Gastroenterology, Internal Medicine  Why: @1:30pm  Contact information:  [address]  [address]  [place] [address]  [phone]               [facility]. Go on 8/18/2024.    Specialty: Home Health Services  Contact information:  [address] [address] [address]  [phone]                           Patient Instructions:      Ambulatory referral/consult to Home Health   Standing Status: Future   Referral Priority: Routine  Referral Type: Home Health   Referral Reason: Specialty Services Required   Requested Specialty: Home Health Services   Number of Visits Requested: 1     Diet renal     Notify your health care provider if you experience any of the following:  temperature >100.4     Notify your health care provider if you experience any of the following:  persistent nausea and vomiting or diarrhea     Notify your health care provider if you experience any of the following:  severe uncontrolled pain     Notify your health care provider if you experience any of the following:  difficulty breathing or increased cough     Notify your health care provider if you experience any of the following:  severe persistent headache     Notify your health care provider if you experience any of the following:  persistent dizziness, light-headedness, or visual disturbances     Notify your health care provider if you experience any of the following:  increased confusion or weakness     Activity as tolerated       Significant Diagnostic Studies: Labs: All labs within the past 24 hours have been reviewed    Pending Diagnostic Studies:       None           Medications:  Reconciled Home Medications:      Medication List        CONTINUE taking these medications      amiodarone 100 MG Tab  Commonly known as: PACERONE  Take 1 tablet (100 mg total) by mouth once daily.     atorvastatin 40 MG tablet  Commonly known as: LIPITOR  Take 40 mg by mouth once daily.     b complex vitamins tablet  Take 1 tablet by mouth once daily.     ELIQUIS 2.5 mg Tab  Generic drug: apixaban  Take 2.5 mg by mouth 2 (two) times daily.     latanoprost 0.005 % ophthalmic solution  Place 1 drop into both eyes every evening.     midodrine 10 MG tablet  Commonly known as: PROAMATINE  Take 1 tablet (10 mg total) by mouth 3 (three) times daily with meals.     ondansetron 4 MG Tbdl  Commonly known as: ZOFRAN-ODT  Take 1 tablet (4 mg total) by mouth every 6 (six) hours as needed (nausea).             STOP taking these medications      metoclopramide HCl 5 MG tablet  Commonly known as: REGLAN     sodium zirconium cyclosilicate 10 gram packet  Commonly known as: Lokelma     VELTASSA 8.4 gram Pwpk  Generic drug: patiromer calcium sorbitex              Indwelling Lines/Drains at time of discharge:   Lines/Drains/Airways       Drain  Duration                  Hemodialysis AV Fistula Right upper arm -- days                    Time spent on the discharge of patient: 33 minutes         KELBY Le  Department of Hospital Medicine  [facility]

## 2024-08-26 ENCOUNTER — DOCUMENT SCAN (OUTPATIENT)
Dept: HOME HEALTH SERVICES | Facility: HOSPITAL | Age: 84
End: 2024-08-26
Payer: MEDICARE

## 2024-09-02 ENCOUNTER — HOSPITAL ENCOUNTER (INPATIENT)
Facility: HOSPITAL | Age: 84
LOS: 2 days | Discharge: HOSPICE | DRG: 070 | End: 2024-09-06
Attending: EMERGENCY MEDICINE | Admitting: FAMILY MEDICINE
Payer: MEDICARE

## 2024-09-02 DIAGNOSIS — N18.6 ESRD (END STAGE RENAL DISEASE): ICD-10-CM

## 2024-09-02 DIAGNOSIS — N18.6 END STAGE RENAL DISEASE: ICD-10-CM

## 2024-09-02 DIAGNOSIS — R53.1 GENERALIZED WEAKNESS: ICD-10-CM

## 2024-09-02 DIAGNOSIS — F03.90 DEMENTIA, UNSPECIFIED DEMENTIA SEVERITY, UNSPECIFIED DEMENTIA TYPE, UNSPECIFIED WHETHER BEHAVIORAL, PSYCHOTIC, OR MOOD DISTURBANCE OR ANXIETY: ICD-10-CM

## 2024-09-02 DIAGNOSIS — G93.41 ENCEPHALOPATHY, METABOLIC: ICD-10-CM

## 2024-09-02 DIAGNOSIS — G93.40 ENCEPHALOPATHY: Primary | ICD-10-CM

## 2024-09-02 DIAGNOSIS — R07.9 CHEST PAIN: ICD-10-CM

## 2024-09-02 DIAGNOSIS — I16.1 HYPERTENSIVE EMERGENCY: ICD-10-CM

## 2024-09-02 PROBLEM — R29.818 FOCAL NEUROLOGICAL DEFICIT: Status: RESOLVED | Noted: 2024-09-02 | Resolved: 2024-09-02

## 2024-09-02 PROBLEM — Z99.2 END STAGE RENAL DISEASE ON DIALYSIS: Status: RESOLVED | Noted: 2021-06-25 | Resolved: 2024-01-01

## 2024-09-02 PROBLEM — R29.818 FOCAL NEUROLOGICAL DEFICIT: Status: ACTIVE | Noted: 2024-01-01

## 2024-09-02 PROBLEM — I13.2 HYPERTENSIVE CARDIOVASCULAR-RENAL DISEASE, STAGE 5 CHRONIC KIDNEY DISEASE OR END STAGE RENAL DISEASE, WITH HEART FAILURE: Status: ACTIVE | Noted: 2024-01-01

## 2024-09-02 LAB
ALBUMIN SERPL BCP-MCNC: 4.3 G/DL (ref 3.5–5.2)
ALP SERPL-CCNC: 65 U/L (ref 55–135)
ALT SERPL W/O P-5'-P-CCNC: 11 U/L (ref 10–44)
AMMONIA PLAS-SCNC: 14 UMOL/L (ref 10–50)
ANION GAP SERPL CALC-SCNC: 15 MMOL/L (ref 8–16)
APTT PPP: 30.3 SEC (ref 21–32)
AST SERPL-CCNC: 26 U/L (ref 10–40)
BASOPHILS # BLD AUTO: 0.03 K/UL (ref 0–0.2)
BASOPHILS NFR BLD: 0.6 % (ref 0–1.9)
BILIRUB SERPL-MCNC: 0.7 MG/DL (ref 0.1–1)
BNP SERPL-MCNC: 1126 PG/ML (ref 0–99)
BNP SERPL-MCNC: 1126 PG/ML (ref 0–99)
BUN SERPL-MCNC: 58 MG/DL (ref 8–23)
CALCIUM SERPL-MCNC: 10 MG/DL (ref 8.7–10.5)
CHLORIDE SERPL-SCNC: 91 MMOL/L (ref 95–110)
CHOLEST SERPL-MCNC: 189 MG/DL (ref 120–199)
CHOLEST/HDLC SERPL: 2.3 {RATIO} (ref 2–5)
CO2 SERPL-SCNC: 29 MMOL/L (ref 23–29)
CREAT SERPL-MCNC: 11.5 MG/DL (ref 0.5–1.4)
CREAT SERPL-MCNC: 12.8 MG/DL (ref 0.5–1.4)
DIFFERENTIAL METHOD BLD: ABNORMAL
EOSINOPHIL # BLD AUTO: 0.7 K/UL (ref 0–0.5)
EOSINOPHIL NFR BLD: 14.4 % (ref 0–8)
ERYTHROCYTE [DISTWIDTH] IN BLOOD BY AUTOMATED COUNT: 21.2 % (ref 11.5–14.5)
EST. GFR  (NO RACE VARIABLE): 3 ML/MIN/1.73 M^2
GLUCOSE SERPL-MCNC: 104 MG/DL (ref 70–110)
GLUCOSE SERPL-MCNC: 98 MG/DL (ref 70–110)
HCT VFR BLD AUTO: 42 % (ref 37–48.5)
HDLC SERPL-MCNC: 82 MG/DL (ref 40–75)
HDLC SERPL: 43.4 % (ref 20–50)
HGB BLD-MCNC: 12.4 G/DL (ref 12–16)
IMM GRANULOCYTES # BLD AUTO: 0.01 K/UL (ref 0–0.04)
IMM GRANULOCYTES NFR BLD AUTO: 0.2 % (ref 0–0.5)
INFLUENZA A, MOLECULAR: NEGATIVE
INFLUENZA B, MOLECULAR: NEGATIVE
INR PPP: 1 (ref 0.8–1.2)
LDLC SERPL CALC-MCNC: 84.6 MG/DL (ref 63–159)
LYMPHOCYTES # BLD AUTO: 1.7 K/UL (ref 1–4.8)
LYMPHOCYTES NFR BLD: 33.2 % (ref 18–48)
MCH RBC QN AUTO: 26.2 PG (ref 27–31)
MCHC RBC AUTO-ENTMCNC: 29.5 G/DL (ref 32–36)
MCV RBC AUTO: 89 FL (ref 82–98)
MONOCYTES # BLD AUTO: 0.6 K/UL (ref 0.3–1)
MONOCYTES NFR BLD: 12.6 % (ref 4–15)
NEUTROPHILS # BLD AUTO: 2 K/UL (ref 1.8–7.7)
NEUTROPHILS NFR BLD: 39 % (ref 38–73)
NONHDLC SERPL-MCNC: 107 MG/DL
NRBC BLD-RTO: 0 /100 WBC
OHS QRS DURATION: 88 MS
OHS QTC CALCULATION: 468 MS
PLATELET # BLD AUTO: 215 K/UL (ref 150–450)
PMV BLD AUTO: 9.9 FL (ref 9.2–12.9)
POTASSIUM SERPL-SCNC: 5 MMOL/L (ref 3.5–5.1)
PROCALCITONIN SERPL IA-MCNC: 1.03 NG/ML (ref 0–0.5)
PROT SERPL-MCNC: 7.6 G/DL (ref 6–8.4)
PROTHROMBIN TIME: 11.4 SEC (ref 9–12.5)
RBC # BLD AUTO: 4.73 M/UL (ref 4–5.4)
SAMPLE: ABNORMAL
SARS-COV-2 RDRP RESP QL NAA+PROBE: NEGATIVE
SODIUM SERPL-SCNC: 135 MMOL/L (ref 136–145)
SPECIMEN SOURCE: NORMAL
T4 FREE SERPL-MCNC: 0.98 NG/DL (ref 0.71–1.51)
TRIGL SERPL-MCNC: 112 MG/DL (ref 30–150)
TROPONIN I SERPL HS-MCNC: 31.3 PG/ML (ref 0–14.9)
TROPONIN I SERPL HS-MCNC: 31.3 PG/ML (ref 0–14.9)
TSH SERPL DL<=0.005 MIU/L-ACNC: 5.97 UIU/ML (ref 0.34–5.6)
WBC # BLD AUTO: 5.06 K/UL (ref 3.9–12.7)

## 2024-09-02 PROCEDURE — 83880 ASSAY OF NATRIURETIC PEPTIDE: CPT | Performed by: EMERGENCY MEDICINE

## 2024-09-02 PROCEDURE — 80061 LIPID PANEL: CPT | Performed by: EMERGENCY MEDICINE

## 2024-09-02 PROCEDURE — 84484 ASSAY OF TROPONIN QUANT: CPT | Performed by: EMERGENCY MEDICINE

## 2024-09-02 PROCEDURE — 90935 HEMODIALYSIS ONE EVALUATION: CPT

## 2024-09-02 PROCEDURE — 96374 THER/PROPH/DIAG INJ IV PUSH: CPT

## 2024-09-02 PROCEDURE — 97162 PT EVAL MOD COMPLEX 30 MIN: CPT

## 2024-09-02 PROCEDURE — 85610 PROTHROMBIN TIME: CPT | Performed by: EMERGENCY MEDICINE

## 2024-09-02 PROCEDURE — G0378 HOSPITAL OBSERVATION PER HR: HCPCS

## 2024-09-02 PROCEDURE — 87040 BLOOD CULTURE FOR BACTERIA: CPT | Performed by: EMERGENCY MEDICINE

## 2024-09-02 PROCEDURE — 97530 THERAPEUTIC ACTIVITIES: CPT

## 2024-09-02 PROCEDURE — 36415 COLL VENOUS BLD VENIPUNCTURE: CPT | Performed by: EMERGENCY MEDICINE

## 2024-09-02 PROCEDURE — 87502 INFLUENZA DNA AMP PROBE: CPT | Performed by: EMERGENCY MEDICINE

## 2024-09-02 PROCEDURE — 84145 PROCALCITONIN (PCT): CPT | Performed by: EMERGENCY MEDICINE

## 2024-09-02 PROCEDURE — 84443 ASSAY THYROID STIM HORMONE: CPT | Performed by: EMERGENCY MEDICINE

## 2024-09-02 PROCEDURE — 82565 ASSAY OF CREATININE: CPT

## 2024-09-02 PROCEDURE — U0002 COVID-19 LAB TEST NON-CDC: HCPCS | Performed by: EMERGENCY MEDICINE

## 2024-09-02 PROCEDURE — 99291 CRITICAL CARE FIRST HOUR: CPT

## 2024-09-02 PROCEDURE — 25000003 PHARM REV CODE 250: Performed by: NURSE PRACTITIONER

## 2024-09-02 PROCEDURE — G0427 INPT/ED TELECONSULT70: HCPCS | Mod: 95,,, | Performed by: STUDENT IN AN ORGANIZED HEALTH CARE EDUCATION/TRAINING PROGRAM

## 2024-09-02 PROCEDURE — 82962 GLUCOSE BLOOD TEST: CPT

## 2024-09-02 PROCEDURE — 94761 N-INVAS EAR/PLS OXIMETRY MLT: CPT

## 2024-09-02 PROCEDURE — 80053 COMPREHEN METABOLIC PANEL: CPT | Performed by: EMERGENCY MEDICINE

## 2024-09-02 PROCEDURE — 63600175 PHARM REV CODE 636 W HCPCS: Performed by: EMERGENCY MEDICINE

## 2024-09-02 PROCEDURE — 84439 ASSAY OF FREE THYROXINE: CPT | Performed by: EMERGENCY MEDICINE

## 2024-09-02 PROCEDURE — 25000003 PHARM REV CODE 250: Performed by: EMERGENCY MEDICINE

## 2024-09-02 PROCEDURE — 85025 COMPLETE CBC W/AUTO DIFF WBC: CPT | Performed by: EMERGENCY MEDICINE

## 2024-09-02 PROCEDURE — 82140 ASSAY OF AMMONIA: CPT | Performed by: EMERGENCY MEDICINE

## 2024-09-02 PROCEDURE — 85730 THROMBOPLASTIN TIME PARTIAL: CPT | Performed by: EMERGENCY MEDICINE

## 2024-09-02 RX ORDER — TALC
6 POWDER (GRAM) TOPICAL NIGHTLY PRN
Status: DISCONTINUED | OUTPATIENT
Start: 2024-09-02 | End: 2024-09-07 | Stop reason: HOSPADM

## 2024-09-02 RX ORDER — SODIUM,POTASSIUM PHOSPHATES 280-250MG
2 POWDER IN PACKET (EA) ORAL
Status: DISCONTINUED | OUTPATIENT
Start: 2024-09-02 | End: 2024-09-02

## 2024-09-02 RX ORDER — AMOXICILLIN 250 MG
1 CAPSULE ORAL 2 TIMES DAILY PRN
Status: DISCONTINUED | OUTPATIENT
Start: 2024-09-02 | End: 2024-09-07 | Stop reason: HOSPADM

## 2024-09-02 RX ORDER — SODIUM CHLORIDE 0.9 % (FLUSH) 0.9 %
10 SYRINGE (ML) INJECTION EVERY 12 HOURS PRN
Status: DISCONTINUED | OUTPATIENT
Start: 2024-09-02 | End: 2024-09-07 | Stop reason: HOSPADM

## 2024-09-02 RX ORDER — HYDROCODONE BITARTRATE AND ACETAMINOPHEN 5; 325 MG/1; MG/1
1 TABLET ORAL EVERY 6 HOURS PRN
Status: DISCONTINUED | OUTPATIENT
Start: 2024-09-02 | End: 2024-09-07 | Stop reason: HOSPADM

## 2024-09-02 RX ORDER — LANOLIN ALCOHOL/MO/W.PET/CERES
800 CREAM (GRAM) TOPICAL
Status: DISCONTINUED | OUTPATIENT
Start: 2024-09-02 | End: 2024-09-02

## 2024-09-02 RX ORDER — IBUPROFEN 200 MG
24 TABLET ORAL
Status: DISCONTINUED | OUTPATIENT
Start: 2024-09-02 | End: 2024-09-07 | Stop reason: HOSPADM

## 2024-09-02 RX ORDER — HYDRALAZINE HYDROCHLORIDE 20 MG/ML
10 INJECTION INTRAMUSCULAR; INTRAVENOUS
Status: COMPLETED | OUTPATIENT
Start: 2024-09-02 | End: 2024-09-02

## 2024-09-02 RX ORDER — HYDRALAZINE HYDROCHLORIDE 20 MG/ML
10 INJECTION INTRAMUSCULAR; INTRAVENOUS EVERY 6 HOURS PRN
Status: DISCONTINUED | OUTPATIENT
Start: 2024-09-02 | End: 2024-09-07 | Stop reason: HOSPADM

## 2024-09-02 RX ORDER — CLONIDINE HYDROCHLORIDE 0.1 MG/1
0.1 TABLET ORAL
Status: COMPLETED | OUTPATIENT
Start: 2024-09-02 | End: 2024-09-02

## 2024-09-02 RX ORDER — LATANOPROST 50 UG/ML
1 SOLUTION/ DROPS OPHTHALMIC NIGHTLY
Status: DISCONTINUED | OUTPATIENT
Start: 2024-09-02 | End: 2024-09-07 | Stop reason: HOSPADM

## 2024-09-02 RX ORDER — ACETAMINOPHEN 325 MG/1
650 TABLET ORAL EVERY 4 HOURS PRN
Status: DISCONTINUED | OUTPATIENT
Start: 2024-09-02 | End: 2024-09-07 | Stop reason: HOSPADM

## 2024-09-02 RX ORDER — IBUPROFEN 200 MG
16 TABLET ORAL
Status: DISCONTINUED | OUTPATIENT
Start: 2024-09-02 | End: 2024-09-07 | Stop reason: HOSPADM

## 2024-09-02 RX ORDER — ACETAMINOPHEN 325 MG/1
650 TABLET ORAL EVERY 8 HOURS PRN
Status: DISCONTINUED | OUTPATIENT
Start: 2024-09-02 | End: 2024-09-07 | Stop reason: HOSPADM

## 2024-09-02 RX ORDER — ALUMINUM HYDROXIDE, MAGNESIUM HYDROXIDE, AND SIMETHICONE 1200; 120; 1200 MG/30ML; MG/30ML; MG/30ML
30 SUSPENSION ORAL 4 TIMES DAILY PRN
Status: DISCONTINUED | OUTPATIENT
Start: 2024-09-02 | End: 2024-09-07 | Stop reason: HOSPADM

## 2024-09-02 RX ORDER — MIDODRINE HYDROCHLORIDE 10 MG/1
10 TABLET ORAL
Status: DISCONTINUED | OUTPATIENT
Start: 2024-09-02 | End: 2024-09-03

## 2024-09-02 RX ORDER — ATORVASTATIN CALCIUM 40 MG/1
40 TABLET, FILM COATED ORAL NIGHTLY
Status: DISCONTINUED | OUTPATIENT
Start: 2024-09-02 | End: 2024-09-07 | Stop reason: HOSPADM

## 2024-09-02 RX ORDER — AMIODARONE HYDROCHLORIDE 100 MG/1
100 TABLET ORAL DAILY
Status: DISCONTINUED | OUTPATIENT
Start: 2024-09-02 | End: 2024-09-07 | Stop reason: HOSPADM

## 2024-09-02 RX ORDER — CALCIUM ACETATE 667 MG/1
1334 CAPSULE ORAL
COMMUNITY
Start: 2024-08-20

## 2024-09-02 RX ORDER — HEPARIN SODIUM 5000 [USP'U]/ML
5000 INJECTION, SOLUTION INTRAVENOUS; SUBCUTANEOUS EVERY 8 HOURS
Status: DISCONTINUED | OUTPATIENT
Start: 2024-09-02 | End: 2024-09-02

## 2024-09-02 RX ORDER — GLUCAGON 1 MG
1 KIT INJECTION
Status: DISCONTINUED | OUTPATIENT
Start: 2024-09-02 | End: 2024-09-07 | Stop reason: HOSPADM

## 2024-09-02 RX ORDER — NICARDIPINE HYDROCHLORIDE 0.2 MG/ML
0-15 INJECTION INTRAVENOUS CONTINUOUS
Status: DISCONTINUED | OUTPATIENT
Start: 2024-09-02 | End: 2024-09-02

## 2024-09-02 RX ORDER — ONDANSETRON HYDROCHLORIDE 2 MG/ML
4 INJECTION, SOLUTION INTRAVENOUS EVERY 6 HOURS PRN
Status: DISCONTINUED | OUTPATIENT
Start: 2024-09-02 | End: 2024-09-07 | Stop reason: HOSPADM

## 2024-09-02 RX ORDER — CALCIUM ACETATE 667 MG/1
1334 CAPSULE ORAL
Status: DISCONTINUED | OUTPATIENT
Start: 2024-09-02 | End: 2024-09-07 | Stop reason: HOSPADM

## 2024-09-02 RX ORDER — NALOXONE HCL 0.4 MG/ML
0.02 VIAL (ML) INJECTION
Status: DISCONTINUED | OUTPATIENT
Start: 2024-09-02 | End: 2024-09-07 | Stop reason: HOSPADM

## 2024-09-02 RX ORDER — LEVOTHYROXINE SODIUM 25 UG/1
75 TABLET ORAL
Status: DISCONTINUED | OUTPATIENT
Start: 2024-09-03 | End: 2024-09-07 | Stop reason: HOSPADM

## 2024-09-02 RX ADMIN — APIXABAN 2.5 MG: 2.5 TABLET, FILM COATED ORAL at 01:09

## 2024-09-02 RX ADMIN — APIXABAN 2.5 MG: 2.5 TABLET, FILM COATED ORAL at 08:09

## 2024-09-02 RX ADMIN — AMIODARONE HYDROCHLORIDE 100 MG: 100 TABLET ORAL at 01:09

## 2024-09-02 RX ADMIN — CALCIUM ACETATE 1334 MG: 667 CAPSULE ORAL at 06:09

## 2024-09-02 RX ADMIN — LATANOPROST 1 DROP: 50 SOLUTION/ DROPS OPHTHALMIC at 08:09

## 2024-09-02 RX ADMIN — HYDRALAZINE HYDROCHLORIDE 10 MG: 20 INJECTION INTRAMUSCULAR; INTRAVENOUS at 08:09

## 2024-09-02 RX ADMIN — Medication 6 MG: at 08:09

## 2024-09-02 RX ADMIN — CALCIUM ACETATE 1334 MG: 667 CAPSULE ORAL at 01:09

## 2024-09-02 RX ADMIN — ATORVASTATIN CALCIUM 40 MG: 40 TABLET, FILM COATED ORAL at 08:09

## 2024-09-02 RX ADMIN — CLONIDINE HYDROCHLORIDE 0.1 MG: 0.1 TABLET ORAL at 08:09

## 2024-09-02 NOTE — TELEMEDICINE CONSULT
[facility]  Vascular Neurology  Comprehensive Stroke Center  TeleVascular Neurology Acute Consultation Note        Consult Information  Consult to Telemedicine - Acute Stroke  Consult performed by: Maribel Mccloud MD  Consult ordered by: Tiny Hendrix MD          Consulting Provider: TINY HENDRIX   Current Providers  No providers found    Patient Location:  [facility] EMERGENCY DEPARTMENT Emergency Department    Spoke hospital nurse at bedside with patient assisting consultant.  Patient information was obtained from primary team.       Stroke Documentation  Acute Stroke Times   Last Known Normal Date: 09/01/24  Last Known Normal Time: 2100  Symptom Onset Date: 09/01/24  Unknown Symptom Onset Time: Unknown Time  Stroke Team Called Date: 09/02/24  Stroke Team Called Time: 0658  Stroke Team Arrival Date: 09/02/24  Stroke Team Arrival Time: 0705  CT Interpretation Time: 0705  Thrombolytic Therapy Recommended: No  Thrombectomy Recommended: No    NIH Scale:  1a. Level of Consciousness: 1-->Not alert, but arousable by minor stimulation to obey, answer, or respond  1b. LOC Questions: 2-->Answers neither question correctly  1c. LOC Commands: 0-->Performs both tasks correctly  2. Best Gaze: 0-->Normal  3. Visual: 0-->No visual loss  4. Facial Palsy: 0-->Normal symmetrical movements  5a. Motor Arm, Left: 1-->Drift, limb holds 90 (or 45) degrees, but drifts down before full 10 seconds, does not hit bed or other support  5b. Motor Arm, Right: 1-->Drift, limb holds 90 (or 45) degrees, but drifts down before full 10 secs, does not hit bed or other support  6a. Motor Leg, Left: 2-->Some effort against gravity, leg falls to bed by 5 secs, but has some effort against gravity  7. Limb Ataxia: 0-->Absent  8. Sensory: 0-->Normal, no sensory loss  9. Best Language: 2-->Severe aphasia, all communication is through fragmentary expression, great need for inference, questioning, and guessing by the listener. Range of  "information that can be exchanged is limited, listener carries burden of. . . (see row details)  10. Dysarthria: 0-->Normal  11. Extinction and Inattention (formerly Neglect): 0-->No abnormality      Modified Joseph:    Kian Coma Scale: 14   ABCD2 Score:    [id number] Score:    HAS -BLED Score:    ICH Score:    Hunt & Horton Classification:      Blood pressure (!) 197/83, pulse 65, temperature 98.1 °F (36.7 °C), resp. rate 16, height 5' 5" (1.651 m), weight 44.5 kg (98 lb), SpO2 99%.      In my opinion, this was a: Tier 1; VAN Stroke Assessment: Negative     Medical Decision Making  HPI:  84 y.o. female with medical history of ESRD on HD, Atrial fibrillation (on Eliquis), HTN, CAD, COPD, previous CVA  - with admission concerns of focal neurological deficits. And Stroke code / neurology called in for the same.      History from primary team and medical records review. Symptoms of confusion, slurred speech, bilateral leg weakness and associated gait instability >4.5 hrs prior to ED arrival. No other focal motor or sensory or cranial nerve deficits.     No compressive neuropathy pattern of presentation. No associated complex headaches or clinical seizures. No similar events in the past. No history of strokes, seizures or migraines.     Exam: lethargic, opens eyes to verbal stimuli, following simple commands, minimal expression, oriented to person, not to month or place, no obvious neglect or visual field deficits, able to anti gravitate all extremities - however no focal motor or cranial nerve deficits + negative myoclonus     Images personally reviewed and interpreted:  Study: Head CT  Study Interpretation: No acute findings      Assessment and plan:  Recs:    Suspect mimic / generalized encephalopathy - metabolic vs any added systemic infectious or toxic triggers rather focal cerebral ischemic event. No TNK as per telestroke eval.     Low suspicion for focal cerebrovascular ischemic event or epileptic or neuro " inflammatory etiology.      Need no further specific interventions or investigations needed from stroke standpoint. If persisting encephalopathy, can consider     - MRI brain WO contrast  - MRA brain and neck WO contrast      Continue home AC  Euglycemic, euthermic, eunatremic goals     Encephalopathy evaluation and management  -- Correct lytes as needed / investigate and control infection if any / avoid hypoxia or hypercapnia / avoid hypoglycemia / control uremia - avoid rapid correction / avoid-correct metabolic-respiratory acidosis or alkalosis   -- Correct any nutritional deficiencies / correct anemia / provide nutritional support as appropriate / ambulate when appropriate   -- PT/OT evaluation and management   -- delirium precautions      Lytics recommendation: Thrombolytic therapy not recommended due to Suspected stroke mimic   Thrombectomy recommendation: No; at this time symptoms not suggestive of large vessel occlusion  Placement recommendation: do not transfer    Past Medical History:   Diagnosis Date    A-fib     Anxiety     Depression     Disorder of kidney and ureter     Encephalopathy acute 1/1/2018    End stage kidney disease 6/17/2017    Gout     Hyperlipidemia     Hypertension     Moderate episode of recurrent major depressive disorder 1/17/2018    Nephropathy hypertensive, 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    Osteopenia of multiple sites 3/9/2018    Based upon bone density measurements. Patient also has chronic kidney disease.    Stroke 11/2016     Past Surgical History:   Procedure Laterality Date    ANGIOGRAM, CORONARY, WITH LEFT HEART CATHETERIZATION N/A 2/7/2022    Procedure: Angiogram, Coronary, with Left Heart Cath;  Surgeon: Gino Leal MD;  Location: [facility] CATH/EP LAB;  Service: Cardiology;  Laterality: N/A;    CARDIAC SURGERY      stents    EYE  SURGERY      WRIST SURGERY       Family History   Problem Relation Name Age of Onset    Heart disease Mother      Cancer Father         Diagnoses  Problem Noted   Focal Neurological Deficit 9/2/2024       Maribel Mccloud MD      Emergent/Acute neurological consultation requested by spoke provider due to critical concerns for possible cerebrovascular event that could result in permanent loss of neurologic/bodily function, severe disability or death of this patient.  Immediate/timely evaluation by a highly prepared expert is paramount for optimal outcomes  High risk for neurological deterioration if not properly diagnosed  High risk for neurological deterioration if not treated promplty/as soon as possible  Complex diagnostic evaluation may be required (advanced imaging)  High risk treatment options (thrombolytics and/or thrombectomy)    Patient care was coordinated with spoke provider, including but not limted to    Discussing likely diagnosis/etiology of symptoms  Making recommendations for further diagnostic studies  Making recommendations for intravenous thrombolytics or other advanced therapies  Making recommendations for disposition (admission/transfer for higher level of care)

## 2024-09-02 NOTE — H&P
[facility]  History & Physical    Patient Name: Tyra Isaac  MRN: [id number]  Patient Class: OP- Observation  Admission Date: 9/2/2024  Attending Physician: Sharri Foley MD   Primary Care Provider: Kalpesh Goel MD         Patient information was obtained from relative(s), past medical records, and ER records.     Subjective:     Principal Problem:Encephalopathy, metabolic    Chief Complaint:   Chief Complaint   Patient presents with    Weakness    Slurred speech     Son states she was talking fine last night and this morning she woke up with slurred speech and even weaker than she was yesterday. She was suppose to be at dialysis today but they told her she was too weak to come        HPI: The patient is an 85-year-old female with a past medical history of end-stage renal disease, hypertension, gastroparesis, atrial fibrillation, previous CVA, hyperlipidemia, who presented to emergency department for evaluation of gradually worsening generalized weakness.  Patient was recently admitted to the hospital in August and was discharged on August 23, 2024 after an admission for confusion weakness and tremors.  Patient was evaluated at that time and she had recently started on Reglan may which was attributed to causing her facial movements and tremors.  Patient continued to improve with rehab efforts and was discharged home.  Patient's son reports that the patient has been doing very well at home recently.  She was participating in PT and OT and eating well up until the past few days.  Patient's son reports that on Friday (today is Monday) that the patient became progressively more weak with a decreased appetite and garbled speech.  Patient was to have dialysis as she is HD Monday Wednesday Friday however she was too sick to go to dialysis today.  Dialysis clinic recommended patient son to bring patient to the ER for evaluation.  Patient is son denies any fever chills  nausea vomiting diarrhea shortness of breath cough or any other complaints.    Patient was evaluated in the emergency department.  Patient was initially hypertensive upon arrival with systolic blood pressure in the 200s.  Patient was given clonidine and hydralazine in the emergency department which improved her blood pressure to systolic 129 on assessment.  Patient's labs consistent with end-stage renal disease.  CT of brain without acute findings, chest x-ray without acute findings.  COVID and influenza screening negative.  TSH mildly elevated at 5.9 which is increased from 3 weeks ago when it was 3.748.  Ammonia negative, troponin mildly elevated at 31.3, this has also improved from 3 weeks ago when it was 177.  Patient will be admitted to the hospital for further evaluation with consultation to neurology and nephrology.    Past Medical History:   Diagnosis Date    A-fib     Anxiety     Depression     Disorder of kidney and ureter     Encephalopathy acute 1/1/2018    End stage kidney disease 6/17/2017    Gout     Hyperlipidemia     Hypertension     Moderate episode of recurrent major depressive disorder 1/17/2018    Nephropathy hypertensive, 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    Osteopenia of multiple sites 3/9/2018    Based upon bone density measurements. Patient also has chronic kidney disease.    Stroke 11/2016       Past Surgical History:   Procedure Laterality Date    ANGIOGRAM, CORONARY, WITH LEFT HEART CATHETERIZATION N/A 2/7/2022    Procedure: Angiogram, Coronary, with Left Heart Cath;  Surgeon: Gino Leal MD;  Location: [facility] CATH/EP LAB;  Service: Cardiology;  Laterality: N/A;    CARDIAC SURGERY      stents    EYE SURGERY      WRIST SURGERY         Review of patient's allergies indicates:   Allergen Reactions    Cyclobenzaprine     Fish containing products Hives     Peanut Other (See Comments)    Tramadol Itching       No current facility-administered medications on file prior to encounter.     Current Outpatient Medications on File Prior to Encounter   Medication Sig    amiodarone (PACERONE) 100 MG Tab Take 1 tablet (100 mg total) by mouth once daily. (Patient taking differently: Take 100 mg by mouth every evening.)    atorvastatin (LIPITOR) 40 MG tablet Take 40 mg by mouth every evening.    b complex vitamins tablet Take 1 tablet by mouth every evening.    calcium acetate,phosphat bind, (PHOSLO) 667 mg capsule Take 1,334 mg by mouth 3 (three) times daily with meals. TAKE 2 CAPSULES BY MOUTH WITH MEALS AND 1 CAPSULE WITH EACH SNACK    ELIQUIS 2.5 mg Tab Take 2.5 mg by mouth 2 (two) times daily.    latanoprost 0.005 % ophthalmic solution Place 1 drop into both eyes every evening.    midodrine (PROAMATINE) 10 MG tablet Take 1 tablet (10 mg total) by mouth 3 (three) times daily with meals.    ondansetron (ZOFRAN-ODT) 4 MG TbDL Take 1 tablet (4 mg total) by mouth every 6 (six) hours as needed (nausea).     Family History       Problem Relation (Age of Onset)    Cancer Father    Heart disease Mother          Tobacco Use    Smoking status: Never    Smokeless tobacco: Never   Substance and Sexual Activity    Alcohol use: No    Drug use: No    Sexual activity: Not Currently     Review of Systems   Constitutional:  Positive for activity change and appetite change. Negative for chills and fever.   Respiratory:  Negative for cough and shortness of breath.    Cardiovascular:  Negative for chest pain and leg swelling.   Gastrointestinal:  Negative for abdominal distention, abdominal pain, anal bleeding, blood in stool, constipation, diarrhea, nausea, rectal pain and vomiting.   Musculoskeletal:  Negative for arthralgias and joint swelling.   Neurological:  Positive for tremors (Chronic) and weakness (Generalized). Negative for dizziness, facial asymmetry, light-headedness and numbness.      Objective:     Vital Signs (Most Recent):  Temp: 98.1 °F (36.7 °C) (09/02/24 0652)  Pulse: 63 (09/02/24 0900)  Resp: (!) 26 (09/02/24 0900)  BP: (!) 119/56 (09/02/24 1132)  SpO2: 98 % (09/02/24 0900) Vital Signs (24h Range):  Temp:  [98.1 °F (36.7 °C)] 98.1 °F (36.7 °C)  Pulse:  [59-77] 63  Resp:  [16-26] 26  SpO2:  [98 %-99 %] 98 %  BP: (119-220)/(56-90) 119/56     Weight: 44.5 kg (98 lb)  Body mass index is 16.31 kg/m².     Physical Exam  Constitutional:       General: She is not in acute distress.     Appearance: She is not toxic-appearing.      Comments: Chronically ill-appearing   HENT:      Head: Normocephalic.      Nose: Nose normal.      Mouth/Throat:      Mouth: Mucous membranes are dry.   Eyes:      Extraocular Movements: Extraocular movements intact.      Conjunctiva/sclera: Conjunctivae normal.   Cardiovascular:      Rate and Rhythm: Normal rate and regular rhythm.      Pulses: Normal pulses.      Heart sounds: Normal heart sounds.   Pulmonary:      Effort: Pulmonary effort is normal.      Breath sounds: Normal breath sounds.   Abdominal:      General: Bowel sounds are normal. There is no distension.      Palpations: Abdomen is soft.      Tenderness: There is no abdominal tenderness. There is no guarding.   Musculoskeletal:         General: No swelling.   Skin:     General: Skin is warm.      Capillary Refill: Capillary refill takes less than 2 seconds.   Neurological:      General: No focal deficit present.      Mental Status: She is alert. Mental status is at baseline.      Comments: Generally weak                Significant Labs: All pertinent labs within the past 24 hours have been reviewed.  Recent Lab Results         09/02/24  0758   09/02/24  0745   09/02/24  0705   09/02/24  0704        Influenza A, Molecular Negative             Influenza B, Molecular Negative             Procalcitonin   1.031  Comment: The Procalcitonin test is intended to aid in the risk assessment of   critically ill  patients on their first day of ICU admission for   progression   to severe sepsis and septic shock.    A result of <0.50 ng/mL is associated with a low risk of severe   sepsis   and/or septic shock.  This does not exclude localized infection.    A result between 0.50 ng/mL and 2.0 ng/mL should be interpreted with   consideration of the patient's history and is recommended to retest   within 6   to 24 hours.        A result of >2.0 ng/mL is associated with a high risk of severe   sepsis   and/or septic shock.    Procalcitonin may not be accurate among patients with   localized  infection, recent trauma or major surgery, immunosuppressed   state,  invasive fungal infection, renal dysfunction. Decisions   regarding  initiation or continuation of antibiotic therapy should not be   based  solely on procalcitonin levels.             Albumin   4.3           ALP   65           ALT   11           Ammonia   14           Anion Gap   15           PTT   30.3  Comment: Refer to local heparin nomogram for intensity/dose specific   therapeutic   range.             AST   26           Baso #   0.03           Basophil %   0.6           BILIRUBIN TOTAL   0.7  Comment: For infants and newborns, interpretation of results should be based  on gestational age, weight and in agreement with clinical  observations.    Premature Infant recommended reference ranges:  Up to 24 hours.............<8.0 mg/dL  Up to 48 hours............<12.0 mg/dL  3-5 days..................<15.0 mg/dL  6-29 days.................<15.0 mg/dL             BNP   1,126  Comment: Values of less than 100 pg/ml are consistent with non-CHF populations.              1,126  Comment: Values of less than 100 pg/ml are consistent with non-CHF populations.           BUN   58           Calcium   10.0           Chloride   91           Cholesterol Total   189  Comment: The National Cholesterol Education Program (NCEP) has set the  following guidelines (reference ranges) for  Cholesterol:  Optimal.....................<200 mg/dL  Borderline High.............200-239 mg/dL  High........................> or = 240 mg/dL             CO2   29           Creatinine   11.5           Differential Method   Automated           eGFR   3.0           Eos #   0.7           Eos %   14.4           Flu A & B Source NP             Free T4   0.98           Glucose   98           Gran # (ANC)   2.0           Gran %   39.0           HDL   82  Comment: The National Cholesterol Education Program (NCEP) has set the  following guidelines (reference values) for HDL Cholesterol:  Low...............<40 mg/dL  Optimal...........>60 mg/dL             HDL/Cholesterol Ratio   43.4           Hematocrit   42.0           Hemoglobin   12.4           Immature Grans (Abs)   0.01  Comment: Mild elevation in immature granulocytes is non specific and   can be seen in a variety of conditions including stress response,   acute inflammation, trauma and pregnancy. Correlation with other   laboratory and clinical findings is essential.             Immature Granulocytes   0.2           INR   1.0  Comment: Coumadin Therapy:  2.0 - 3.0 for INR for all indicators except mechanical heart valves  and antiphospholipid syndromes which should use 2.5 - 3.5.             LDL Cholesterol   84.6  Comment: The National Cholesterol Education Program (NCEP) has set the  following guidelines (reference values) for LDL Cholesterol:  Optimal.......................<130 mg/dL  Borderline High...............130-159 mg/dL  High..........................160-189 mg/dL  Very High.....................>190 mg/dL             Lymph #   1.7           Lymph %   33.2           MCH   26.2           MCHC   29.5           MCV   89           Mono #   0.6           Mono %   12.6           MPV   9.9           Non-HDL Cholesterol   107  Comment: Risk category and Non-HDL cholesterol goals:  Coronary heart disease (CHD)or equivalent (10-year risk of CHD >20%):  Non-HDL  cholesterol goal     <130 mg/dL  Two or more CHD risk factors and 10-year risk of CHD <= 20%:  Non-HDL cholesterol goal     <160 mg/dL  0 to 1 CHD risk factor:  Non-HDL cholesterol goal     <190 mg/dL             nRBC   0           Platelet Count   215           POC Creatinine       12.8       POC Glucose     104         Potassium   5.0           PROTEIN TOTAL   7.6           PT   11.4           RBC   4.73           RDW   21.2           Sample       VENOUS       SARS-CoV-2 RNA, Amplification, Qual Negative  Comment: This test utilizes isothermal nucleic acid amplification technology   to   detect the SARS-CoV-2 RdRp nucleic acid segment. The analytical   sensitivity   (limit of detection) is 500 copies/swab.     A POSITIVE result is indicative of the presence of SARS-CoV-2 RNA;   clinical   correlation with patient history and other diagnostic information is   necessary to determine patient infection status.    A NEGATIVE result means that SARS-CoV-2 nucleic acids are not present   above   the limit of detection. A NEGATIVE result should be treated as   presumptive.   It does not rule out the possibility of COVID-19 and should not be   the sole   basis for treatment decisions. If COVID-19 is strongly suspected   based on   clinical and exposure history, re-testing using an alternate   molecular assay   should be considered.     This test is FDA approved.Performance characteristics of this test   has been   independently verified by [facility] in conjunction   with   [facility] Department of Pathology and Laboratory   Medicine.               Sodium   135           Total Cholesterol/HDL Ratio   2.3           Triglycerides   112  Comment: The National Cholesterol Education Program (NCEP) has set the  following guidelines (reference values) for triglycerides:  Normal......................<150 mg/dL  Borderline High.............150-199 mg/dL  High........................200-499 mg/dL              Troponin I High Sensitivity   31.3  Comment: Troponin results differ between methods. Do not use   results between Troponin methods interchangeably.    Alkaline Phospatase levels above 400 U/L may   cause false positive results.    Access hsTnI should not be used for patients taking   Asfotase rosy (Strensiq).                31.3  Comment: Troponin results differ between methods. Do not use   results between Troponin methods interchangeably.    Alkaline Phospatase levels above 400 U/L may   cause false positive results.    Access hsTnI should not be used for patients taking   Asfotase rosy (Strensiq).             TSH   5.975           WBC   5.06                   Significant Imaging: I have reviewed all pertinent imaging results/findings within the past 24 hours.    CT brain without acute intracranial hemorrhage, mass effect  Chest x-ray no evidence of acute cardiopulmonary disease  EKG normal sinus rhythm, rate 60, no STEMI  Assessment/Plan:     * Encephalopathy, metabolic  Patient with HTN and ESRD history   CT brain negative for acute findings  CBC and CMP consistent with end-stage renal disease   TSH mildly elevated  Ammonia 14  UA pending  Neurology consulted  Fall precautions  Consult PT/OT   Supportive care       Atrial fibrillation  Patient with Persistent (7 days or more) atrial fibrillation which is controlled currently with Amiodarone. Patient is currently in sinus rhythm.[id number] Score: 4. HASBLED Score: 3. Anticoagulation indicated. Anticoagulation done with Eliquis .    Hypertensive cardiovascular-renal disease, stage 5 chronic kidney disease or end stage renal disease, with heart failure  Creatine stable for now. BMP reviewed- noted Estimated Creatinine Clearance: 2.6 mL/min (A) (based on SCr of 11.5 mg/dL (H)). according to latest data. Based on current GFR, CKD stage is end stage.  Monitor UOP and serial BMP and adjust therapy as needed. Renally dose meds. Avoid nephrotoxic medications and  procedures. Consult to nephrology for HD MWF     Elevated TSH    TSH 5, was 3 on prior admission a few weeks ago  Start Synthroid 75 mcg daily      VTE Risk Mitigation (From admission, onward)           Ordered     apixaban tablet 2.5 mg  2 times daily         09/02/24 1146     IP VTE HIGH RISK PATIENT  Once         09/02/24 1005     Place sequential compression device  Until discontinued         09/02/24 1005                         On 09/02/2024, patient should be placed in hospital observation services under my care in collaboration with Dr. Foley.           KELBY Montoya  Department of Hospital Medicine  [facility] [place]

## 2024-09-02 NOTE — SUBJECTIVE & OBJECTIVE
HPI:  84 y.o. female with medical history of ESRD on HD, Atrial fibrillation (on Eliquis), HTN, CAD, COPD, previous CVA  - with admission concerns of focal neurological deficits. And Stroke code / neurology called in for the same.      History from primary team and medical records review. Symptoms of confusion, slurred speech, bilateral leg weakness and associated gait instability >4.5 hrs prior to ED arrival. No other focal motor or sensory or cranial nerve deficits.     No compressive neuropathy pattern of presentation. No associated complex headaches or clinical seizures. No similar events in the past. No history of strokes, seizures or migraines.     Exam: lethargic, opens eyes to verbal stimuli, following simple commands, minimal expression, oriented to person, not to month or place, no obvious neglect or visual field deficits, able to anti gravitate all extremities - however no focal motor or cranial nerve deficits + negative myoclonus     Images personally reviewed and interpreted:  Study: Head CT  Study Interpretation: No acute findings      Assessment and plan:  Recs:    Suspect mimic / generalized encephalopathy - metabolic vs any added systemic infectious or toxic triggers rather focal cerebral ischemic event. No TNK as per telestroke eval.     Low suspicion for focal cerebrovascular ischemic event or epileptic or neuro inflammatory etiology.      Need no further specific interventions or investigations needed from stroke standpoint. If persisting encephalopathy, can consider     - MRI brain WO contrast  - MRA brain and neck WO contrast      Encephalopathy evaluation and management  -- Correct lytes as needed / investigate and control infection if any / avoid hypoxia or hypercapnia / avoid hypoglycemia / control uremia - avoid rapid correction / avoid-correct metabolic-respiratory acidosis or alkalosis   -- Correct any nutritional deficiencies / correct anemia / provide nutritional support as appropriate  / ambulate when appropriate   -- PT/OT evaluation and management   -- delirium precautions      Lytics recommendation: Thrombolytic therapy not recommended due to Suspected stroke mimic   Thrombectomy recommendation: No; at this time symptoms not suggestive of large vessel occlusion  Placement recommendation: do not transfer

## 2024-09-02 NOTE — ASSESSMENT & PLAN NOTE
Creatine stable for now. BMP reviewed- noted Estimated Creatinine Clearance: 2.6 mL/min (A) (based on SCr of 11.5 mg/dL (H)). according to latest data. Based on current GFR, CKD stage is end stage.  Monitor UOP and serial BMP and adjust therapy as needed. Renally dose meds. Avoid nephrotoxic medications and procedures.  Consulted nephrology for dialysis Monday Wednesday Friday.

## 2024-09-02 NOTE — CONSULTS
INPATIENT NEPHROLOGY CONSULT   [facility]    Patient Name: Tyra Isaac  Date: 09/02/2024    Reason for consultation: ESRD    Chief Complaint:   Chief Complaint   Patient presents with    Weakness    Slurred speech     Son states she was talking fine last night and this morning she woke up with slurred speech and even weaker than she was yesterday. She was suppose to be at dialysis today but they told her she was too weak to come       History of Present Illness:  84-year-old female presented emergency department with gradually worsening generalized weakness. Patient was feeling weak yesterday and had slurred speech and has difficulty walking over the past 2-3 days per son. Patient on Eliquis and has history of arrhythmias. Patient has difficulty answering questions and patient does have generalized weakness and has some difficulty following commands. Patient however is understanding some questions and following some commands. Patient had previous history of being encephalopathic. Son describes this as more gradual onset of symptoms and patient was gradually getting worse. Patient has history of end-stage renal disease and is scheduled to get dialysis this morning. As patient was extremely weak and they called dialysis and was told to come here. Patient has difficulty walking on her own and needed assistance. Patient is able to move all extremities however has weakness in all extremities and has difficulty walking secondary to generalized weakness. Patient per son was gradually deteriorating. Patient was doing physical therapy and responding well last week however yesterday all day yesterday patient was feeling shaky and had slurring of speech and was having weakness. Consulted for dialysis.     Interval History:  9/2- admit /90, on RA, CT head, neg, CXR clear, BNP 1226 (3400 in Aug), ordered routine HD/UF, she was given clonidine and IV hydralazine and BP improved- seen in ER-  reports not being able to stand/walk- continues with same twitching noted during prior admissions along with same speech changes noted during prior admissions    Plan of Care:    Assessment:  ESRD on HD MWF  Hypertensive emergency with acute neurological changes  SHPT  Anemia of CKD    Plan:    - ordered HD MWF  - monitor BP post BP meds given-- on midodrine for orthostatic hypotension- ordered 1-3L UF  - resume binder with meals  - no acute BRAYAN needs    Thank you for allowing us to participate in this patient's care. We will continue to follow.    Vital Signs:  Temp Readings from Last 3 Encounters:   09/02/24 98.1 °F (36.7 °C)   08/17/24 98.8 °F (37.1 °C) (Oral)   08/09/24 98.2 °F (36.8 °C) (Oral)       Pulse Readings from Last 3 Encounters:   09/02/24 (!) 59   08/20/24 63   08/17/24 64       BP Readings from Last 3 Encounters:   09/02/24 (!) 218/84   08/20/24 (!) 131/53   08/17/24 (!) 145/62       Weight:  Wt Readings from Last 3 Encounters:   09/02/24 44.5 kg (98 lb)   08/13/24 47.6 kg (104 lb 15 oz)   08/09/24 46.3 kg (102 lb)       Past Medical & Surgical History:  Past Medical History:   Diagnosis Date    A-fib     Anxiety     Depression     Disorder of kidney and ureter     Encephalopathy acute 1/1/2018    End stage kidney disease 6/17/2017    Gout     Hyperlipidemia     Hypertension     Moderate episode of recurrent major depressive disorder 1/17/2018    Nephropathy hypertensive, 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    Osteopenia of multiple sites 3/9/2018    Based upon bone density measurements. Patient also has chronic kidney disease.    Stroke 11/2016       Past Surgical History:   Procedure Laterality Date    ANGIOGRAM, CORONARY, WITH LEFT HEART CATHETERIZATION N/A 2/7/2022    Procedure: Angiogram, Coronary, with Left Heart Cath;  Surgeon: Gino Leal MD;  Location:  [facility] CATH/EP LAB;  Service: Cardiology;  Laterality: N/A;    CARDIAC SURGERY      stents    EYE SURGERY      WRIST SURGERY         Past Social History:  Social History     Socioeconomic History    Marital status:      Spouse name: Aria Isaac    Number of children: 3   Occupational History    Occupation: Not working   Tobacco Use    Smoking status: Never    Smokeless tobacco: Never   Substance and Sexual Activity    Alcohol use: No    Drug use: No    Sexual activity: Not Currently     Social Determinants of Health     Financial Resource Strain: Low Risk  (8/13/2024)    Overall Financial Resource Strain (CARDIA)     Difficulty of Paying Living Expenses: Not hard at all   Recent Concern: Financial Resource Strain - Medium Risk (7/11/2024)    Overall Financial Resource Strain (CARDIA)     Difficulty of Paying Living Expenses: Somewhat hard   Food Insecurity: No Food Insecurity (8/13/2024)    Hunger Vital Sign     Worried About Running Out of Food in the Last Year: Never true     Ran Out of Food in the Last Year: Never true   Transportation Needs: No Transportation Needs (8/13/2024)    TRANSPORTATION NEEDS     Transportation : No   Physical Activity: Inactive (8/13/2024)    Exercise Vital Sign     Days of Exercise per Week: 0 days     Minutes of Exercise per Session: 0 min   Stress: Stress Concern Present (8/20/2024)    [employer] - Occupational Stress Questionnaire     Feeling of Stress : Rather much   Housing Stability: Low Risk  (8/13/2024)    Housing Stability Vital Sign     Unable to Pay for Housing in the Last Year: No     Homeless in the Last Year: No       Medications:  Scheduled Meds:   heparin (porcine)  5,000 Units Subcutaneous Q8H    nitroGLYCERIN 2% TD oint  1 inch Topical (Top) ED 1 Time     Continuous Infusions:   nicardipine  0-15 mg/hr Intravenous Continuous         PRN Meds:.  Current Facility-Administered Medications:     acetaminophen, 650 mg, Oral, Q8H  PRN    acetaminophen, 650 mg, Oral, Q4H PRN    aluminum-magnesium hydroxide-simethicone, 30 mL, Oral, QID PRN    dextrose 50%, 12.5 g, Intravenous, PRN    dextrose 50%, 25 g, Intravenous, PRN    glucagon (human recombinant), 1 mg, Intramuscular, PRN    glucose, 16 g, Oral, PRN    glucose, 24 g, Oral, PRN    HYDROcodone-acetaminophen, 1 tablet, Oral, Q6H PRN    magnesium oxide, 800 mg, Oral, PRN    magnesium oxide, 800 mg, Oral, PRN    melatonin, 6 mg, Oral, Nightly PRN    naloxone, 0.02 mg, Intravenous, PRN    ondansetron, 4 mg, Intravenous, Q6H PRN    potassium bicarbonate, 35 mEq, Oral, PRN    potassium bicarbonate, 50 mEq, Oral, PRN    potassium bicarbonate, 60 mEq, Oral, PRN    potassium, sodium phosphates, 2 packet, Oral, PRN    potassium, sodium phosphates, 2 packet, Oral, PRN    potassium, sodium phosphates, 2 packet, Oral, PRN    senna-docusate 8.6-50 mg, 1 tablet, Oral, BID PRN    sodium chloride 0.9%, 10 mL, Intravenous, Q12H PRN  No current facility-administered medications on file prior to encounter.     Current Outpatient Medications on File Prior to Encounter   Medication Sig Dispense Refill    amiodarone (PACERONE) 100 MG Tab Take 1 tablet (100 mg total) by mouth once daily. (Patient taking differently: Take 100 mg by mouth every evening.) 30 tablet 0    atorvastatin (LIPITOR) 40 MG tablet Take 40 mg by mouth every evening.      b complex vitamins tablet Take 1 tablet by mouth every evening.      calcium acetate,phosphat bind, (PHOSLO) 667 mg capsule Take 1,334 mg by mouth 3 (three) times daily with meals. TAKE 2 CAPSULES BY MOUTH WITH MEALS AND 1 CAPSULE WITH EACH SNACK      ELIQUIS 2.5 mg Tab Take 2.5 mg by mouth 2 (two) times daily.      latanoprost 0.005 % ophthalmic solution Place 1 drop into both eyes every evening.      midodrine (PROAMATINE) 10 MG tablet Take 1 tablet (10 mg total) by mouth 3 (three) times daily with meals. 270 tablet 0    ondansetron (ZOFRAN-ODT) 4 MG TbDL Take 1 tablet (4 mg  total) by mouth every 6 (six) hours as needed (nausea). 30 tablet 5       Allergies:  Cyclobenzaprine, Fish containing products, Peanut, and Tramadol    Past Family History:  Reviewed; refer to Hospitalist Admission Note    Review of Systems:  Review of Systems - All 14 systems reviewed and negative, except as noted in HPI    Physical Exam:  General Appearance:    NAD, AAO x 3, cooperative, appears stated age   Head:    Normocephalic, atraumatic   Eyes:    PER, EOMI, and conjunctiva/sclera clear bilaterally       Mouth:   Moist mucus membranes, no thrush or oral lesions,       normal dentition   Back:     No CVA tenderness   Lungs:     Clear to auscultation bilaterally, no wheezes, crackles,           rales or rhonchi, symmetric air movement, respirations unlabored   Chest wall:    No tenderness or deformity   Heart:    Regular rate and rhythm, S1 and S2 normal, no murmur, rub   or gallop   Abdomen:     Soft, non-tender, non-distended, bowel sounds active all four   quadrants, no RT or guarding, no masses, no organomegaly   Extremities:   Warm and well perfused, distal pulses are intact, no             cyanosis or peripheral edema   MSK:   No joint or muscle swelling, tenderness or deformity   Skin:   Skin color, texture, turgor normal, no rashes or lesions   Neurologic/Psychiatric:   CNII-XII intact, normal strength and sensation       throughout, no asterixis; normal affect, memory, judgement     and insight      Results:  Lab Results   Component Value Date     (L) 09/02/2024    K 5.0 09/02/2024    CL 91 (L) 09/02/2024    CO2 29 09/02/2024    BUN 58 (H) 09/02/2024    CREATININE 11.5 (H) 09/02/2024    CALCIUM 10.0 09/02/2024    ANIONGAP 15 09/02/2024    ESTGFRAFRICA 4.7 (A) 02/08/2022    EGFRNONAA 4.0 (A) 02/08/2022       Lab Results   Component Value Date    CALCIUM 10.0 09/02/2024    PHOS 3.0 08/13/2024       Recent Labs   Lab 09/02/24  0745   WBC 5.06   RBC 4.73   HGB 12.4   HCT 42.0      MCV 89    [facility] 26.2*   [facility] 29.5*       I have personally reviewed pertinent radiological imaging and reports from this admission.    I have spent > 70 minutes providing care for this patient for the above diagnoses. These services have included chart/data/imaging review, evaluation, exam, formulation of plan, , note preparation, and discussions with staff involved in this patient's care.    Rose Maynard MD MPH  [facility] [address] [address]  [phone] (p)  [phone] (f)

## 2024-09-02 NOTE — PROGRESS NOTES
HD tx tolerated fair    Net UF 1.9L net  Pt stable, denies complaints     09/02/24 1730   Handoff Report   Received From Uyen Schneider   Given To Lorrie   Vital Signs   Temp 97.4 °F (36.3 °C)   Temp Source Oral   Pulse 64   Resp 18   SpO2 97 %   Pulse Oximetry Type Intermittent   Probe Placed On (Pulse Ox) finger   /60   BP Location Left arm   BP Method Automatic   Patient Position Lying   Assessments (Pre/Post)   Blood Liters Processed (BLP) 51.9   Transport Modality bed   Level of Consciousness (AVPU) alert   Dialyzer Clearance mildly streaked   Post-Hemodialysis Assessment   Rinseback Volume (mL) 250 mL   Blood Volume Processed (Liters) 51.9 L   Dialyzer Clearance Lightly streaked   Duration of Treatment 180 minutes   Additional Fluid Intake (mL) 500 mL   Total UF (mL) 2400 mL   Net Fluid Removal 1900   Patient Response to Treatment Tolerated fair   Arterial bleeding stop time (min) 10 min   Venous bleeding stop time (min) 10 min   Post-Hemodialysis Comments Tx complete, pt stable

## 2024-09-02 NOTE — ASSESSMENT & PLAN NOTE
Creatine stable for now. BMP reviewed- noted Estimated Creatinine Clearance: 2.6 mL/min (A) (based on SCr of 11.5 mg/dL (H)). according to latest data. Based on current GFR, CKD stage is end stage.  Monitor UOP and serial BMP and adjust therapy as needed. Renally dose meds. Avoid nephrotoxic medications and procedures. Consult to nephrology for HD MWF

## 2024-09-02 NOTE — NURSING
"Spoke with pt son Raffi Isaac to help answer assessment questions. Pt stated "I can't really talk that well" when asked her birthday. Pt son is aware of situation at hospital, expresses understanding of his mother's condition presently in the hospital. Conversation lasted 15 minutes. In closing conversation pt son stated "I hope I haven't started drinking when y'all call me. I'm going to be drinking and hopefully I haven't started when y'all call to come get her." Son was given opportunity to ask questions. Verbalized understanding of care plan presently.  "

## 2024-09-02 NOTE — SUBJECTIVE & OBJECTIVE
Past Medical History:   Diagnosis Date    A-fib     Anxiety     Depression     Disorder of kidney and ureter     Encephalopathy acute 1/1/2018    End stage kidney disease 6/17/2017    Gout     Hyperlipidemia     Hypertension     Moderate episode of recurrent major depressive disorder 1/17/2018    Nephropathy hypertensive, 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    Osteopenia of multiple sites 3/9/2018    Based upon bone density measurements. Patient also has chronic kidney disease.    Stroke 11/2016       Past Surgical History:   Procedure Laterality Date    ANGIOGRAM, CORONARY, WITH LEFT HEART CATHETERIZATION N/A 2/7/2022    Procedure: Angiogram, Coronary, with Left Heart Cath;  Surgeon: Gino Leal MD;  Location: [facility] CATH/EP LAB;  Service: Cardiology;  Laterality: N/A;    CARDIAC SURGERY      stents    EYE SURGERY      WRIST SURGERY         Review of patient's allergies indicates:   Allergen Reactions    Cyclobenzaprine     Fish containing products Hives    Peanut Other (See Comments)    Tramadol Itching       No current facility-administered medications on file prior to encounter.     Current Outpatient Medications on File Prior to Encounter   Medication Sig    amiodarone (PACERONE) 100 MG Tab Take 1 tablet (100 mg total) by mouth once daily. (Patient taking differently: Take 100 mg by mouth every evening.)    atorvastatin (LIPITOR) 40 MG tablet Take 40 mg by mouth every evening.    b complex vitamins tablet Take 1 tablet by mouth every evening.    calcium acetate,phosphat bind, (PHOSLO) 667 mg capsule Take 1,334 mg by mouth 3 (three) times daily with meals. TAKE 2 CAPSULES BY MOUTH WITH MEALS AND 1 CAPSULE WITH EACH SNACK    ELIQUIS 2.5 mg Tab Take 2.5 mg by mouth 2 (two) times daily.    latanoprost 0.005 % ophthalmic solution Place 1 drop into both eyes every evening.     midodrine (PROAMATINE) 10 MG tablet Take 1 tablet (10 mg total) by mouth 3 (three) times daily with meals.    ondansetron (ZOFRAN-ODT) 4 MG TbDL Take 1 tablet (4 mg total) by mouth every 6 (six) hours as needed (nausea).     Family History       Problem Relation (Age of Onset)    Cancer Father    Heart disease Mother          Tobacco Use    Smoking status: Never    Smokeless tobacco: Never   Substance and Sexual Activity    Alcohol use: No    Drug use: No    Sexual activity: Not Currently     Review of Systems   Constitutional:  Positive for activity change and appetite change. Negative for chills and fever.   Respiratory:  Negative for cough and shortness of breath.    Cardiovascular:  Negative for chest pain and leg swelling.   Gastrointestinal:  Negative for abdominal distention, abdominal pain, anal bleeding, blood in stool, constipation, diarrhea, nausea, rectal pain and vomiting.   Musculoskeletal:  Negative for arthralgias and joint swelling.   Neurological:  Positive for tremors (Chronic) and weakness (Generalized). Negative for dizziness, facial asymmetry, light-headedness and numbness.     Objective:     Vital Signs (Most Recent):  Temp: 98.1 °F (36.7 °C) (09/02/24 0652)  Pulse: 63 (09/02/24 0900)  Resp: (!) 26 (09/02/24 0900)  BP: (!) 119/56 (09/02/24 1132)  SpO2: 98 % (09/02/24 0900) Vital Signs (24h Range):  Temp:  [98.1 °F (36.7 °C)] 98.1 °F (36.7 °C)  Pulse:  [59-77] 63  Resp:  [16-26] 26  SpO2:  [98 %-99 %] 98 %  BP: (119-220)/(56-90) 119/56     Weight: 44.5 kg (98 lb)  Body mass index is 16.31 kg/m².     Physical Exam  Constitutional:       General: She is not in acute distress.     Appearance: She is not toxic-appearing.      Comments: Chronically ill-appearing   HENT:      Head: Normocephalic.      Nose: Nose normal.      Mouth/Throat:      Mouth: Mucous membranes are dry.   Eyes:      Extraocular Movements: Extraocular movements intact.      Conjunctiva/sclera: Conjunctivae normal.    Cardiovascular:      Rate and Rhythm: Normal rate and regular rhythm.      Pulses: Normal pulses.      Heart sounds: Normal heart sounds.   Pulmonary:      Effort: Pulmonary effort is normal.      Breath sounds: Normal breath sounds.   Abdominal:      General: Bowel sounds are normal. There is no distension.      Palpations: Abdomen is soft.      Tenderness: There is no abdominal tenderness. There is no guarding.   Musculoskeletal:         General: No swelling.   Skin:     General: Skin is warm.      Capillary Refill: Capillary refill takes less than 2 seconds.   Neurological:      General: No focal deficit present.      Mental Status: She is alert. Mental status is at baseline.      Comments: Generally weak                Significant Labs: All pertinent labs within the past 24 hours have been reviewed.  Recent Lab Results         09/02/24  0758   09/02/24  0745   09/02/24  0705   09/02/24  0704        Influenza A, Molecular Negative             Influenza B, Molecular Negative             Procalcitonin   1.031  Comment: The Procalcitonin test is intended to aid in the risk assessment of   critically ill patients on their first day of ICU admission for   progression   to severe sepsis and septic shock.    A result of <0.50 ng/mL is associated with a low risk of severe   sepsis   and/or septic shock.  This does not exclude localized infection.    A result between 0.50 ng/mL and 2.0 ng/mL should be interpreted with   consideration of the patient's history and is recommended to retest   within 6   to 24 hours.        A result of >2.0 ng/mL is associated with a high risk of severe   sepsis   and/or septic shock.    Procalcitonin may not be accurate among patients with   localized  infection, recent trauma or major surgery, immunosuppressed   state,  invasive fungal infection, renal dysfunction. Decisions   regarding  initiation or continuation of antibiotic therapy should not be   based  solely on procalcitonin  levels.             Albumin   4.3           ALP   65           ALT   11           Ammonia   14           Anion Gap   15           PTT   30.3  Comment: Refer to local heparin nomogram for intensity/dose specific   therapeutic   range.             AST   26           Baso #   0.03           Basophil %   0.6           BILIRUBIN TOTAL   0.7  Comment: For infants and newborns, interpretation of results should be based  on gestational age, weight and in agreement with clinical  observations.    Premature Infant recommended reference ranges:  Up to 24 hours.............<8.0 mg/dL  Up to 48 hours............<12.0 mg/dL  3-5 days..................<15.0 mg/dL  6-29 days.................<15.0 mg/dL             BNP   1,126  Comment: Values of less than 100 pg/ml are consistent with non-CHF populations.              1,126  Comment: Values of less than 100 pg/ml are consistent with non-CHF populations.           BUN   58           Calcium   10.0           Chloride   91           Cholesterol Total   189  Comment: The National Cholesterol Education Program (NCEP) has set the  following guidelines (reference ranges) for Cholesterol:  Optimal.....................<200 mg/dL  Borderline High.............200-239 mg/dL  High........................> or = 240 mg/dL             CO2   29           Creatinine   11.5           Differential Method   Automated           eGFR   3.0           Eos #   0.7           Eos %   14.4           Flu A & B Source NP             Free T4   0.98           Glucose   98           Gran # (ANC)   2.0           Gran %   39.0           HDL   82  Comment: The National Cholesterol Education Program (NCEP) has set the  following guidelines (reference values) for HDL Cholesterol:  Low...............<40 mg/dL  Optimal...........>60 mg/dL             HDL/Cholesterol Ratio   43.4           Hematocrit   42.0           Hemoglobin   12.4           Immature Grans (Abs)   0.01  Comment: Mild elevation in immature granulocytes  is non specific and   can be seen in a variety of conditions including stress response,   acute inflammation, trauma and pregnancy. Correlation with other   laboratory and clinical findings is essential.             Immature Granulocytes   0.2           INR   1.0  Comment: Coumadin Therapy:  2.0 - 3.0 for INR for all indicators except mechanical heart valves  and antiphospholipid syndromes which should use 2.5 - 3.5.             LDL Cholesterol   84.6  Comment: The National Cholesterol Education Program (NCEP) has set the  following guidelines (reference values) for LDL Cholesterol:  Optimal.......................<130 mg/dL  Borderline High...............130-159 mg/dL  High..........................160-189 mg/dL  Very High.....................>190 mg/dL             Lymph #   1.7           Lymph %   33.2           MCH   26.2           MCHC   29.5           MCV   89           Mono #   0.6           Mono %   12.6           MPV   9.9           Non-HDL Cholesterol   107  Comment: Risk category and Non-HDL cholesterol goals:  Coronary heart disease (CHD)or equivalent (10-year risk of CHD >20%):  Non-HDL cholesterol goal     <130 mg/dL  Two or more CHD risk factors and 10-year risk of CHD <= 20%:  Non-HDL cholesterol goal     <160 mg/dL  0 to 1 CHD risk factor:  Non-HDL cholesterol goal     <190 mg/dL             nRBC   0           Platelet Count   215           POC Creatinine       12.8       POC Glucose     104         Potassium   5.0           PROTEIN TOTAL   7.6           PT   11.4           RBC   4.73           RDW   21.2           Sample       VENOUS       SARS-CoV-2 RNA, Amplification, Qual Negative  Comment: This test utilizes isothermal nucleic acid amplification technology   to   detect the SARS-CoV-2 RdRp nucleic acid segment. The analytical   sensitivity   (limit of detection) is 500 copies/swab.     A POSITIVE result is indicative of the presence of SARS-CoV-2 RNA;   clinical   correlation with patient history  and other diagnostic information is   necessary to determine patient infection status.    A NEGATIVE result means that SARS-CoV-2 nucleic acids are not present   above   the limit of detection. A NEGATIVE result should be treated as   presumptive.   It does not rule out the possibility of COVID-19 and should not be   the sole   basis for treatment decisions. If COVID-19 is strongly suspected   based on   clinical and exposure history, re-testing using an alternate   molecular assay   should be considered.     This test is FDA approved.Performance characteristics of this test   has been   independently verified by [facility] in conjunction   with   [facility] Department of Pathology and Laboratory   Medicine.               Sodium   135           Total Cholesterol/HDL Ratio   2.3           Triglycerides   112  Comment: The National Cholesterol Education Program (NCEP) has set the  following guidelines (reference values) for triglycerides:  Normal......................<150 mg/dL  Borderline High.............150-199 mg/dL  High........................200-499 mg/dL             Troponin I High Sensitivity   31.3  Comment: Troponin results differ between methods. Do not use   results between Troponin methods interchangeably.    Alkaline Phospatase levels above 400 U/L may   cause false positive results.    Access hsTnI should not be used for patients taking   Asfotase rosy (Strensiq).                31.3  Comment: Troponin results differ between methods. Do not use   results between Troponin methods interchangeably.    Alkaline Phospatase levels above 400 U/L may   cause false positive results.    Access hsTnI should not be used for patients taking   Asfotase rosy (Strensiq).             TSH   5.975           WBC   5.06                   Significant Imaging: I have reviewed all pertinent imaging results/findings within the past 24 hours.    CT brain without acute intracranial hemorrhage, mass  effect  Chest x-ray no evidence of acute cardiopulmonary disease  EKG normal sinus rhythm, rate 60, no STEMI

## 2024-09-02 NOTE — HPI
The patient is an 85-year-old female with a past medical history of end-stage renal disease, hypertension, gastroparesis, atrial fibrillation, previous CVA, hyperlipidemia, who presented to emergency department for evaluation of gradually worsening generalized weakness.  Patient was recently admitted to the hospital in August and was discharged on August 23, 2024 after an admission for confusion weakness and tremors.  Patient was evaluated at that time and she had recently started on Reglan may which was attributed to causing her facial movements and tremors.  Patient continued to improve with rehab efforts and was discharged home.  Patient's son reports that the patient has been doing very well at home recently.  She was participating in PT and OT and eating well up until the past few days.  Patient's son reports that on Friday (today is Monday) that the patient became progressively more weak with a decreased appetite and garbled speech.  Patient was to have dialysis as she is HD Monday Wednesday Friday however she was too sick to go to dialysis today.  Dialysis clinic recommended patient son to bring patient to the ER for evaluation.  Patient is son denies any fever chills nausea vomiting diarrhea shortness of breath cough or any other complaints.    Patient was evaluated in the emergency department.  Patient was initially hypertensive upon arrival with systolic blood pressure in the 200s.  Patient was given clonidine and hydralazine in the emergency department which improved her blood pressure to systolic 129 on assessment.  Patient's labs consistent with end-stage renal disease.  CT of brain without acute findings, chest x-ray without acute findings.  COVID and influenza screening negative.  TSH mildly elevated at 5.9 which is increased from 3 weeks ago when it was 3.748.  Ammonia negative, troponin mildly elevated at 31.3, this has also improved from 3 weeks ago when it was 177.  Patient will be admitted to the  [facility] for further evaluation with consultation to neurology and nephrology.

## 2024-09-02 NOTE — ASSESSMENT & PLAN NOTE
Patient with Persistent (7 days or more) atrial fibrillation which is controlled currently with Amiodarone. Patient is currently in sinus rhythm.[id number] Score: 4. HASBLED Score: 3. Anticoagulation indicated. Anticoagulation done with Eliquis .

## 2024-09-02 NOTE — PT/OT/SLP EVAL
Physical Therapy Evaluation    Patient Name:  Tyra Isaac   MRN:  [id number]    Recommendations:     Discharge Recommendations: Moderate Intensity Therapy   Discharge Equipment Recommendations: none   Barriers to discharge:  significant tremors with movement, increase assist with mobility, high risk for falls    Assessment:     Tyra Isaac is a 84 y.o. female admitted with a medical diagnosis of <principal problem not specified>.  She presents with the following impairments/functional limitations: weakness, impaired endurance, impaired functional mobility, gait instability, impaired balance, decreased lower extremity function, decreased safety awareness, pain, impaired cardiopulmonary response to activity.    Pt found in bed with Hob elevated. No tremors noted at rest. Tremors started with initiation of movement to sit EOB. Pt required mod A with bed mobility. Pt initially required min/mod A for sitting balance due to tremors but with increase time in sitting tremors decrease but returned when attempting to assess LE strength and when trying to bring a cup to her mouth.     Rehab Prognosis: Fair; patient would benefit from acute skilled PT services to address these deficits and reach maximum level of function.    Recent Surgery: * No surgery found *      Plan:     During this hospitalization, patient to be seen 5 x/week to address the identified rehab impairments via therapeutic activities, gait training, therapeutic exercises, neuromuscular re-education and progress toward the following goals:    Plan of Care Expires:  10/02/24    Subjective     Chief Complaint: tremors, fear of falling  Patient/Family Comments/goals: get better  Pain/Comfort:  Pain Rating 1: 0/10    Patients cultural, spiritual, Rastafari conflicts given the current situation: no    Living Environment:  Pt lives with son and DIL in a [facility] with 2-3 KEY. (-) .   Prior to admission, patients level of function was Kady with  rollator.  Equipment used at home: rollator, wheelchair.  DME owned (not currently used): none.  Upon discharge, patient will have assistance from family.    Objective:     Communicated with RN prior to session.  Patient found HOB elevated with peripheral IV, telemetry  upon PT entry to room.    General Precautions: Standard, fall  Orthopedic Precautions:N/A   Braces: N/A  Respiratory Status: Room air    Exams:  RLE ROM: WFL  RLE Strength: 3/5 assessment limited due to tremors  LLE ROM: WFL  LLE Strength: 3/5 assessment limited due to tremors    Functional Mobility:  Bed Mobility:     Supine to Sit: moderate assistance  Sit to Supine: moderate assistance      AM-PAC 6 CLICK MOBILITY  Total Score:10       Treatment & Education:  Pt educated on POC, discharge recommendation, need for assist with mobility, importance of time Oob, midline seated positioning/balance, use of call bell to seek assistance as needed and fall prevention      Patient left HOB elevated with all lines intact, call button in reach, bed alarm on, and nurse present.    GOALS:   Multidisciplinary Problems       Physical Therapy Goals          Problem: Physical Therapy    Goal Priority Disciplines Outcome Goal Variances Interventions   Physical Therapy Goal     PT, PT/OT Progressing     Description: Goals to be met by: 10/2/24     Patient will increase functional independence with mobility by performin. Supine to sit with MInimal Assistance  2. Sit to stand transfer with Minimal Assistance  3. Bed to chair transfer with Minimal Assistance using Rolling Walker  4. Gait  x 25 feet with Minimal Assistance using Rolling Walker.                                 History:     Past Medical History:   Diagnosis Date    A-fib     Anxiety     Depression     Disorder of kidney and ureter     Encephalopathy acute 2018    End stage kidney disease 2017    Gout     Hyperlipidemia     Hypertension     Moderate episode of recurrent major depressive  disorder 1/17/2018    Nephropathy hypertensive, 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    Osteopenia of multiple sites 3/9/2018    Based upon bone density measurements. Patient also has chronic kidney disease.    Stroke 11/2016       Past Surgical History:   Procedure Laterality Date    ANGIOGRAM, CORONARY, WITH LEFT HEART CATHETERIZATION N/A 2/7/2022    Procedure: Angiogram, Coronary, with Left Heart Cath;  Surgeon: Gino Leal MD;  Location: [facility] CATH/EP LAB;  Service: Cardiology;  Laterality: N/A;    CARDIAC SURGERY      stents    EYE SURGERY      WRIST SURGERY         Time Tracking:     PT Received On: 09/02/24  PT Start Time: 1321     PT Stop Time: 1335  PT Total Time (min): 14 min     Billable Minutes: Evaluation 6 and Therapeutic Activity 8      09/02/2024

## 2024-09-02 NOTE — ASSESSMENT & PLAN NOTE
Patient with HTN and ESRD history   CT brain negative for acute findings  CBC and CMP consistent with end-stage renal disease   TSH mildly elevated  Ammonia 14  UA pending  Neurology consulted  Fall precautions  Consult PT/OT   Supportive care

## 2024-09-02 NOTE — NURSING
Patient transported back on floor from dialysis; dialysis nurse report 1.9L off. Patient ate a small portion of dinner tray.

## 2024-09-02 NOTE — NURSING
Nurses Note -- 4 Eyes      9/2/2024   6:22 PM      Skin assessed during: Admit      [x] No Altered Skin Integrity Present    [x]Prevention Measures Documented      [] Yes- Altered Skin Integrity Present or Discovered   [] LDA Added if Not in Epic (Describe Wound)   [] New Altered Skin Integrity was Present on Admit and Documented in LDA   [] Wound Image Taken    Wound Care Consulted? No    Attending Nurse:  Lorrie Henao RN/Staff Member:   Sherrill REYES

## 2024-09-02 NOTE — ED PROVIDER NOTES
Encounter Date: 9/2/2024       History     Chief Complaint   Patient presents with    Weakness    Slurred speech     Son states she was talking fine last night and this morning she woke up with slurred speech and even weaker than she was yesterday. She was suppose to be at dialysis today but they told her she was too weak to come     84-year-old female presented emergency department with gradually worsening generalized weakness.  Patient was feeling weak yesterday and had slurred speech and has difficulty walking over the past 2-3 days per son.  Patient on Eliquis and has history of arrhythmias.  Patient has difficulty answering questions and patient does have generalized weakness and has some difficulty following commands.  Patient however is understanding some questions and following some commands.  Patient had previous history of being encephalopathic.  Son describes this as more gradual onset of symptoms and patient was gradually getting worse.  Patient has history of end-stage renal disease and is scheduled to get dialysis this morning.  As patient was extremely weak and they called dialysis and was told to come here.  Patient has difficulty walking on her own and needed assistance.  Patient is able to move all extremities however has weakness in all extremities and has difficulty walking secondary to generalized weakness.  Patient per son was gradually deteriorating.  Patient was doing physical therapy and responding well last week however yesterday all day yesterday patient was feeling shaky and had slurring of speech and was having weakness.      Review of patient's allergies indicates:   Allergen Reactions    Cyclobenzaprine     Fish containing products Hives    Peanut Other (See Comments)    Tramadol Itching     Past Medical History:   Diagnosis Date    A-fib     Anxiety     Depression     Disorder of kidney and ureter     Encephalopathy acute 1/1/2018    End stage kidney disease 6/17/2017    Gout      Hyperlipidemia     Hypertension     Moderate episode of recurrent major depressive disorder 1/17/2018    Nephropathy hypertensive, 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    Osteopenia of multiple sites 3/9/2018    Based upon bone density measurements. Patient also has chronic kidney disease.    Stroke 11/2016     Past Surgical History:   Procedure Laterality Date    ANGIOGRAM, CORONARY, WITH LEFT HEART CATHETERIZATION N/A 2/7/2022    Procedure: Angiogram, Coronary, with Left Heart Cath;  Surgeon: Gino Leal MD;  Location: [facility] CATH/EP LAB;  Service: Cardiology;  Laterality: N/A;    CARDIAC SURGERY      stents    EYE SURGERY      WRIST SURGERY       Family History   Problem Relation Name Age of Onset    Heart disease Mother      Cancer Father       Social History     Tobacco Use    Smoking status: Never    Smokeless tobacco: Never   Substance Use Topics    Alcohol use: No    Drug use: No     Review of Systems   Constitutional:  Positive for fatigue.   Neurological:  Positive for speech difficulty and weakness.        Difficulty walking with diffuse generalized weakness but able to move all extremities and able to follow commands but slow to respond.   All other systems reviewed and are negative.      Physical Exam     Initial Vitals [09/02/24 0652]   BP Pulse Resp Temp SpO2   (!) 197/83 65 16 98.1 °F (36.7 °C) 99 %      MAP       --         Physical Exam    Nursing note and vitals reviewed.  Constitutional: She appears well-developed and well-nourished.   Diffuse generalized weakness and slow to respond   HENT:   Head: Normocephalic and atraumatic.   Nose: Nose normal.   Mouth/Throat: Oropharynx is clear and moist.   Eyes: Conjunctivae and EOM are normal. Pupils are equal, round, and reactive to light.   Neck: Neck supple. No thyromegaly present. No tracheal deviation present. No  JVD present.   Normal range of motion.  Cardiovascular:  Normal rate, regular rhythm, normal heart sounds and intact distal pulses.           No murmur heard.  Pulmonary/Chest: Breath sounds normal. No stridor. No respiratory distress. She has no wheezes. She has no rales.   Abdominal: Abdomen is soft. Bowel sounds are normal. She exhibits no distension. There is no abdominal tenderness.   Musculoskeletal:         General: No edema. Normal range of motion.      Cervical back: Normal range of motion and neck supple.     Neurological: No cranial nerve deficit or sensory deficit.   Oriented to place and person and very slow to respond.  Patient able to move all extremities however has slurring of speech and generalized malaise and slow response time and patient however is moving all extremities but weak in all extremities.  This is more consistent with generalized weakness and encephalopathy.   Skin: Skin is warm. Capillary refill takes less than 2 seconds.   Psychiatric: She has a normal mood and affect. Thought content normal.         ED Course   Critical Care    Date/Time: 9/2/2024 9:06 AM    Performed by: Tiny Zavala MD  Authorized by: Tiny Zavala MD  Direct patient critical care time: 20 minutes  Ordering / reviewing critical care time: 5 minutes  Documentation critical care time: 5 minutes  Consulting other physicians critical care time: 10 minutes  Total critical care time (exclusive of procedural time) : 40 minutes        Labs Reviewed   CBC W/ AUTO DIFFERENTIAL - Abnormal       Result Value    WBC 5.06      RBC 4.73      Hemoglobin 12.4      Hematocrit 42.0      MCV 89      MCH 26.2 (*)     MCHC 29.5 (*)     RDW 21.2 (*)     Platelets 215      MPV 9.9      Immature Granulocytes 0.2      Gran # (ANC) 2.0      Immature Grans (Abs) 0.01      Lymph # 1.7      Mono # 0.6      Eos # 0.7 (*)     Baso # 0.03      nRBC 0      Gran % 39.0      Lymph % 33.2      Mono % 12.6      Eosinophil % 14.4 (*)     Basophil % 0.6       Differential Method Automated     COMPREHENSIVE METABOLIC PANEL - Abnormal    Sodium 135 (*)     Potassium 5.0      Chloride 91 (*)     CO2 29      Glucose 98      BUN 58 (*)     Creatinine 11.5 (*)     Calcium 10.0      Total Protein 7.6      Albumin 4.3      Total Bilirubin 0.7      Alkaline Phosphatase 65      AST 26      ALT 11      eGFR 3.0 (*)     Anion Gap 15     TSH - Abnormal    TSH 5.975 (*)    LIPID PANEL - Abnormal    Cholesterol 189      Triglycerides 112      HDL 82 (*)     LDL Cholesterol 84.6      HDL/Cholesterol Ratio 43.4      Total Cholesterol/HDL Ratio 2.3      Non-HDL Cholesterol 107     TROPONIN I HIGH SENSITIVITY - Abnormal    Troponin I High Sensitivity 31.3 (*)    B-TYPE NATRIURETIC PEPTIDE - Abnormal    BNP 1,126 (*)    PROCALCITONIN - Abnormal    Procalcitonin 1.031 (*)    TROPONIN I HIGH SENSITIVITY - Abnormal    Troponin I High Sensitivity 31.3 (*)    B-TYPE NATRIURETIC PEPTIDE - Abnormal    BNP 1,126 (*)    ISTAT CREATININE - Abnormal    POC Creatinine 12.8 (*)     Sample VENOUS     CULTURE, BLOOD    Narrative:     Collection has been rescheduled by [facility] at 09/02/2024 08:01 Reason:   Unable to collect  Collection has been rescheduled by [facility] at 09/02/2024 08:01 Reason:   Unable to collect   CULTURE, BLOOD    Narrative:     Collection has been rescheduled by [facility] at 09/02/2024 08:01 Reason:   Unable to collect  Collection has been rescheduled by [facility] at 09/02/2024 08:01 Reason:   Unable to collect   PROTIME-INR    Prothrombin Time 11.4      INR 1.0     APTT    aPTT 30.3     AMMONIA    Ammonia 14     INFLUENZA A AND B ANTIGEN    Influenza A, Molecular Negative      Influenza B, Molecular Negative      Flu A & B Source NP      Narrative:     Specimen Source->Nasopharyngeal Swab   SARS-COV-2 RNA AMPLIFICATION, QUAL    SARS-CoV-2 RNA, Amplification, Qual Negative     T4, FREE    Free T4 0.98     URINALYSIS, REFLEX TO URINE CULTURE   POCT GLUCOSE    POC Glucose 104     POCT GLUCOSE  MONITORING CONTINUOUS   POCT LACTATE     EKG Readings: (Independently Interpreted)   Initial Reading: No STEMI. Rhythm: Normal Sinus Rhythm. Ectopy: No Ectopy. Conduction: Normal.     ECG Results              ECG 12 lead (In process)        Collection Time Result Time QRS Duration OHS QTC Calculation    09/02/24 07:47:35 09/02/24 09:45:14 88 468                     In process by Interface, Lab In [facility] (09/02/24 09:45:21)                   Narrative:    Test Reason : R29.818,    Vent. Rate : 060 BPM     Atrial Rate : 060 BPM     P-R Int : 206 ms          QRS Dur : 088 ms      QT Int : 468 ms       P-R-T Axes : 091 000 023 degrees     QTc Int : 468 ms    Normal sinus rhythm  Septal infarct (cited on or before 12-AUG-2024)  Abnormal ECG  When compared with ECG of 12-AUG-2024 10:57,  T wave inversion no longer evident in Lateral leads    Referred By: [email or web address]   SELF           Confirmed By:                                   Imaging Results              X-Ray Chest AP Portable (Final result)  Result time 09/02/24 07:45:28      Final result by Aleksey Atkins MD (09/02/24 07:45:28)                   Impression:      No evidence of acute cardiopulmonary disease.      Electronically signed by: Aleksey Atkins  Date:    09/02/2024  Time:    07:45               Narrative:    EXAMINATION:  XR CHEST AP PORTABLE    CLINICAL HISTORY:  Sepsis; weakness and slurred speech    FINDINGS:  Portable chest radiograph at 07:11 hours compared to prior exams shows the cardiomediastinal silhouette and pulmonary vasculature are within normal limits.  There are aortic vascular calcifications.    The lungs are normally expanded, with no consolidation, large pleural effusion, or evidence of pulmonary edema. No pneumothorax.  The bones are diffusely osteopenic.                                       CT HEAD FOR STROKE (Final result)  Result time 09/02/24 07:14:33      Final result by Aleksey Atkins MD (09/02/24 07:14:33)                    Impression:      No acute intracranial hemorrhage, mass effect, or loss of gray-white differentiation.      Electronically signed by: Aleksey Parmarvianney  Date:    09/02/2024  Time:    07:14               Narrative:    EXAMINATION:  CT HEAD FOR STROKE    CLINICAL HISTORY:  Neuro deficit, acute, stroke suspected;    TECHNIQUE:  Thin axial noncontrast imaging through the brain was performed.    FINDINGS:  Comparison to multiple prior exams.  There is no acute intracranial hemorrhage, with no mass effect or abnormal extra-axial fluid.  Extensive nonspecific hypoattenuation involves the white matter, with gray-white differentiation maintained.    There is stable generalized prominence of the cortical sulci and ventricles. The cerebellum and brainstem are unremarkable.  There are dense carotid siphon vascular calcifications.  The visualized paranasal sinuses and mastoid air cells are clear. No acute fracture or destructive osseous lesion.                                    X-Rays:   Independently Interpreted Readings:   Other Readings:  CT brain does not show evidence of acute hemorrhage    Medications   nitroGLYCERIN 2% TD oint ointment 1 inch (has no administration in time range)   niCARdipine 40 mg/200 mL (0.2 mg/mL) infusion (has no administration in time range)   hydrALAZINE injection 10 mg (10 mg Intravenous Given 9/2/24 0826)   cloNIDine tablet 0.1 mg (0.1 mg Oral Given 9/2/24 0825)     Medical Decision Making  84-year-old female presented emergency department with generalized malaise and weakness and had been weak all day with slurring of speech and generalized weakness and slow response time.  Patient has end-stage renal disease and due to get dialysis this morning and given the weakness was brought in here.  Patient noted to be hypotensive.  Patient evaluated by neurologist and determine that patient has metabolic condition and encephalopathy.  No evidence of acute stroke findings.  Screening labs and workup reviewed  and blood pressure managed.  Patient would need dialysis and further evaluation for generalized malaise and encephalopathy.  This might be gradual worsening of patient condition and I do not believe this is an acute change and is more gradual deconditioning.  Hospital Medicine consulted for evaluation for admission.  Nephrology consulted for dialysis and patient's blood pressure managed in the emergency department.    Amount and/or Complexity of Data Reviewed  Labs: ordered. Decision-making details documented in ED Course.  Radiology: ordered. Decision-making details documented in ED Course.  ECG/medicine tests: ordered and independent interpretation performed. Decision-making details documented in ED Course.  Discussion of management or test interpretation with external provider(s): TPA not given as patient not a candidate for tPA as patient does not have any stroke symptoms and symptoms are ongoing for about 24 hours at this point.    Risk  Prescription drug management.  Decision regarding hospitalization.               ED Course as of 09/02/24 0958   Mon Sep 02, 2024   0955 Hospital Medicine physician recommended not giving antibiotic until she sees the patient so antibiotics not given. [UM]      ED Course User Index  [UM] Tiny Zavala MD                           Clinical Impression:  Final diagnoses:  [G93.40] Encephalopathy (Primary)  [R53.1] Generalized weakness  [I16.1] Hypertensive emergency  [N18.6] End stage renal disease          ED Disposition Condition    Admit Critical                Tiny Zavala MD  09/02/24 0915       Tiny Zavala MD  09/02/24 0916       Tiny Zavala MD  09/02/24 0958

## 2024-09-02 NOTE — CONSULTS
[facility]  Department of Neurology  Neurology Consultation Note        PATIENT NAME: Tyra Isaac  MRN: [id number]  CSN: [id number]      TODAY'S DATE: 09/02/2024  ADMIT DATE: 9/2/2024                            CONSULTING PROVIDER: Rafael Wagner MD  CONSULT REQUESTED BY: Sharri Foley MD      Reason for consult:  Encephalopathy and generalized weakness      History obtained from chart review.    HPI per EMR: Tyra Isaac is a 84 y.o. female presented to the emergency department with gradually worsening generalized weakness. Patient was feeling weak yesterday and had slurred speech and has difficulty walking over the past 2-3 days per son. Patient on Eliquis and has history of arrhythmias. Patient has difficulty answering questions and patient does have generalized weakness and has some difficulty following commands. Patient however is understanding some questions and following some commands. Patient had previous history of being encephalopathic. Son describes this as more gradual onset of symptoms and patient was gradually getting worse. Patient has history of end-stage renal disease and is scheduled to get dialysis this morning. As patient was extremely weak and they called dialysis and was told to come here. Patient has difficulty walking on her own and needed assistance. Patient is able to move all extremities however has weakness in all extremities and has difficulty walking secondary to generalized weakness. Patient per son was gradually deteriorating. Patient was doing physical therapy and responding well last week however yesterday all day yesterday patient was feeling shaky and had slurring of speech and was having weakness.     Neurology consult:  Patient was seen examined by me.  She is alert, oriented to self and place but not to time.  He is able to follow commands however not able to provide history.  She apparently presented to the hospital with generalized  weakness, slurred speech and difficulty walking for the past 2-3 days.  Patient has a history of end-stage renal disease and was scheduled for dialysis this morning however patient was extremely weak and when the family called dialysis unit, the recommended to go to the hospital.    Tele stroke evaluated the patient in the ER and there was concern for stroke mimic.  Neurology consulted for encephalopathy and generalized weakness.    PREVIOUS MEDICAL HISTORY:  Past Medical History:   Diagnosis Date    A-fib     Anxiety     Depression     Disorder of kidney and ureter     Encephalopathy acute 1/1/2018    End stage kidney disease 6/17/2017    Gout     Hyperlipidemia     Hypertension     Moderate episode of recurrent major depressive disorder 1/17/2018    Nephropathy hypertensive, 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    Osteopenia of multiple sites 3/9/2018    Based upon bone density measurements. Patient also has chronic kidney disease.    Stroke 11/2016     PREVIOUS SURGICAL HISTORY:  Past Surgical History:   Procedure Laterality Date    ANGIOGRAM, CORONARY, WITH LEFT HEART CATHETERIZATION N/A 2/7/2022    Procedure: Angiogram, Coronary, with Left Heart Cath;  Surgeon: Gino Leal MD;  Location: [facility] CATH/EP LAB;  Service: Cardiology;  Laterality: N/A;    CARDIAC SURGERY      stents    EYE SURGERY      WRIST SURGERY       FAMILY MEDICAL HISTORY:  Family History   Problem Relation Name Age of Onset    Heart disease Mother      Cancer Father       ALLERGIES:  Review of patient's allergies indicates:   Allergen Reactions    Cyclobenzaprine     Fish containing products Hives    Peanut Other (See Comments)    Tramadol Itching     HOME MEDICATIONS:  Prior to Admission medications    Medication Sig Start Date End Date Taking? Authorizing Provider   amiodarone (PACERONE) 100 MG Tab Take 1  tablet (100 mg total) by mouth once daily.  Patient taking differently: Take 100 mg by mouth every evening. 7/20/24 7/20/25 Yes Maurilio Rowe MD   atorvastatin (LIPITOR) 40 MG tablet Take 40 mg by mouth every evening.   Yes Provider, Historical   b complex vitamins tablet Take 1 tablet by mouth every evening.   Yes Provider, Historical   calcium acetate,phosphat bind, (PHOSLO) 667 mg capsule Take 1,334 mg by mouth 3 (three) times daily with meals. TAKE 2 CAPSULES BY MOUTH WITH MEALS AND 1 CAPSULE WITH EACH SNACK 8/20/24  Yes Provider, Historical   ELIQUIS 2.5 mg Tab Take 2.5 mg by mouth 2 (two) times daily. 8/6/24  Yes Provider, Historical   latanoprost 0.005 % ophthalmic solution Place 1 drop into both eyes every evening. 6/30/23  Yes Provider, Historical   midodrine (PROAMATINE) 10 MG tablet Take 1 tablet (10 mg total) by mouth 3 (three) times daily with meals. 12/21/23 9/2/24 Yes Tiffany Quarles MD   ondansetron (ZOFRAN-ODT) 4 MG TbDL Take 1 tablet (4 mg total) by mouth every 6 (six) hours as needed (nausea). 7/11/24  Yes Kalpesh Goel MD     CURRENT SCHEDULED MEDICATIONS:   amiodarone  100 mg Oral Daily    apixaban  2.5 mg Oral BID    atorvastatin  40 mg Oral QHS    calcium acetate(phosphat bind)  1,334 mg Oral TID WM    latanoprost  1 drop Both Eyes QHS    [START ON 9/3/2024] levothyroxine  75 mcg Oral Before breakfast    midodrine  10 mg Oral TID WM    nitroGLYCERIN 2% TD oint  1 inch Topical (Top) ED 1 Time     CURRENT INFUSIONS:    CURRENT PRN MEDICATIONS:    Current Facility-Administered Medications:     acetaminophen, 650 mg, Oral, Q8H PRN    acetaminophen, 650 mg, Oral, Q4H PRN    aluminum-magnesium hydroxide-simethicone, 30 mL, Oral, QID PRN    dextrose 50%, 12.5 g, Intravenous, PRN    dextrose 50%, 25 g, Intravenous, PRN    glucagon (human recombinant), 1 mg, Intramuscular, PRN    glucose, 16 g, Oral, PRN    glucose, 24 g, Oral, PRN    hydrALAZINE, 10 mg, Intravenous, Q6H PRN     "HYDROcodone-acetaminophen, 1 tablet, Oral, Q6H PRN    magnesium oxide, 800 mg, Oral, PRN    magnesium oxide, 800 mg, Oral, PRN    melatonin, 6 mg, Oral, Nightly PRN    naloxone, 0.02 mg, Intravenous, PRN    ondansetron, 4 mg, Intravenous, Q6H PRN    potassium bicarbonate, 35 mEq, Oral, PRN    potassium bicarbonate, 50 mEq, Oral, PRN    potassium bicarbonate, 60 mEq, Oral, PRN    potassium, sodium phosphates, 2 packet, Oral, PRN    potassium, sodium phosphates, 2 packet, Oral, PRN    potassium, sodium phosphates, 2 packet, Oral, PRN    senna-docusate 8.6-50 mg, 1 tablet, Oral, BID PRN    sodium chloride 0.9%, 10 mL, Intravenous, Q12H PRN    REVIEW OF SYSTEMS:  Please refer to the HPI for all pertinent positive and negative findings. A comprehensive review of all other systems was negative.    PHYSICAL EXAM:  Patient Vitals for the past 24 hrs:   BP Temp Temp src Pulse Resp SpO2 Height Weight   09/02/24 1300 (!) 160/71 97.1 °F (36.2 °C) Oral 63 19 98 % -- --   09/02/24 1132 (!) 119/56 -- -- -- -- -- -- --   09/02/24 0900 (!) 129/59 -- -- 63 (!) 26 98 % -- --   09/02/24 0826 (!) 218/84 -- -- (!) 59 20 99 % -- --   09/02/24 0825 (!) 218/84 -- -- -- -- -- -- --   09/02/24 0759 (!) 220/90 -- -- (!) 59 20 99 % -- --   09/02/24 0723 (!) 197/83 -- -- 77 18 99 % -- --   09/02/24 0652 (!) 197/83 98.1 °F (36.7 °C) -- 65 16 99 % 5' 5" (1.651 m) 44.5 kg (98 lb)       GENERAL APPEARANCE: Alert, well-developed, well-nourished female in no acute distress.  HEENT: Normocephalic and atraumatic. PERRL. Oropharynx unremarkable.  PULM: Normal respiratory effort. No accessory muscle use.  CV: RRR.  ABDOMEN: Soft, nontender.  EXTREMITIES: No obvious signs of vascular compromise. Pulses present. No cyanosis, clubbing or edema.  SKIN: Clear; no rashes, lesions or skin breaks in exposed areas.    NEURO:  MENTAL STATUS:  Alert, oriented to self and place but not to time.  Not able to provide much history.    CRANIAL NERVES:  Pupils equal round " "and reactive to light, extraocular movements are intact, face is grossly symmetric.    MOTOR:  Bulk normal. Tone normal and symmetric throughout.  Abnormal movements  asterixis in bilateral upper extremities .  Tremor: none present.  Strength  3+/5 throughout .    REFLEXES:  DTRs 1+ throughout.  Plantar response equivocal bilaterally.  SENSATION: grossly intact throughout.  COORDINATION:  Did not assess .  STATION: not tested.  GAIT: not tested.      Labs:  Recent Labs   Lab 09/02/24 0745   *   K 5.0   CL 91*   CO2 29   BUN 58*   CREATININE 11.5*   GLU 98   CALCIUM 10.0     Recent Labs   Lab 09/02/24  0745   WBC 5.06   HGB 12.4   HCT 42.0        Recent Labs   Lab 09/02/24 0745   ALBUMIN 4.3   PROT 7.6   BILITOT 0.7   ALKPHOS 65   ALT 11   AST 26     Lab Results   Component Value Date    INR 1.0 09/02/2024     Lab Results   Component Value Date    TRIG 112 09/02/2024    HDL 82 (H) 09/02/2024    CHOLHDL 43.4 09/02/2024     Lab Results   Component Value Date    HGBA1C 5.1 06/28/2024     No results found for: "PROTEINCSF", "GLUCCSF", "WBCCSF"    Imaging:  I have reviewed and interpreted the pertinent imaging and lab results.      X-Ray Chest AP Portable  Narrative: EXAMINATION:  XR CHEST AP PORTABLE    CLINICAL HISTORY:  Sepsis; weakness and slurred speech    FINDINGS:  Portable chest radiograph at 07:11 hours compared to prior exams shows the cardiomediastinal silhouette and pulmonary vasculature are within normal limits.  There are aortic vascular calcifications.    The lungs are normally expanded, with no consolidation, large pleural effusion, or evidence of pulmonary edema. No pneumothorax.  The bones are diffusely osteopenic.  Impression: No evidence of acute cardiopulmonary disease.    Electronically signed by: Aleksey Atkins  Date:    09/02/2024  Time:    07:45  CT HEAD FOR STROKE  Narrative: EXAMINATION:  CT HEAD FOR STROKE    CLINICAL HISTORY:  Neuro deficit, acute, stroke " suspected;    TECHNIQUE:  Thin axial noncontrast imaging through the brain was performed.    FINDINGS:  Comparison to multiple prior exams.  There is no acute intracranial hemorrhage, with no mass effect or abnormal extra-axial fluid.  Extensive nonspecific hypoattenuation involves the white matter, with gray-white differentiation maintained.    There is stable generalized prominence of the cortical sulci and ventricles. The cerebellum and brainstem are unremarkable.  There are dense carotid siphon vascular calcifications.  The visualized paranasal sinuses and mastoid air cells are clear. No acute fracture or destructive osseous lesion.  Impression: No acute intracranial hemorrhage, mass effect, or loss of gray-white differentiation.    Electronically signed by: Aleksey Atkins  Date:    09/02/2024  Time:    07:14         ASSESSMENT & PLAN:      Encephalopathy   MICHELLE on CKD  Hypertensive emergency  \paroxysmal AFib    Plan:   Encephalopathy likely secondary to hypertensive emergency and metabolic derangements with MICHELLE on CKD.  Less likely to be an acute intracranial pathology however can not completely rule out.    MRI brain ordered and pending.    Slowly lower blood pressure to normotensive range.  Avoid rapid lowering  Continue with Eliquis, Lipitor for stroke prevention.    PT OT and speech therapy evaluate and treat.    Avoid sedating medications anticholinergics.    Will follow           Thank you kindly for including us in the care of this patient. Please do not hesitate to contact us with any questions.             Rafael Wagner MD  Neurology/vascular Neurology  Date of Service: 09/02/2024  1:15 PM    --------------------------------------------------------------------------------------------------------------------------------------------------------------------------------------------------------------------------------------------------------------  Please note: This note was transcribed using voice  recognition software. Because of this technology there are often uinintended grammatical, spelling, and other transcription errors. Please disregard these errors.

## 2024-09-03 LAB
ALBUMIN SERPL BCP-MCNC: 4.6 G/DL (ref 3.5–5.2)
ALP SERPL-CCNC: 70 U/L (ref 55–135)
ALT SERPL W/O P-5'-P-CCNC: 9 U/L (ref 10–44)
ANION GAP SERPL CALC-SCNC: 12 MMOL/L (ref 8–16)
AST SERPL-CCNC: 25 U/L (ref 10–40)
BASOPHILS # BLD AUTO: 0.03 K/UL (ref 0–0.2)
BASOPHILS NFR BLD: 0.5 % (ref 0–1.9)
BILIRUB SERPL-MCNC: 1 MG/DL (ref 0.1–1)
BUN SERPL-MCNC: 35 MG/DL (ref 8–23)
CALCIUM SERPL-MCNC: 10.6 MG/DL (ref 8.7–10.5)
CHLORIDE SERPL-SCNC: 100 MMOL/L (ref 95–110)
CO2 SERPL-SCNC: 22 MMOL/L (ref 23–29)
CREAT SERPL-MCNC: 8.3 MG/DL (ref 0.5–1.4)
DIFFERENTIAL METHOD BLD: ABNORMAL
EOSINOPHIL # BLD AUTO: 0.9 K/UL (ref 0–0.5)
EOSINOPHIL NFR BLD: 16.3 % (ref 0–8)
ERYTHROCYTE [DISTWIDTH] IN BLOOD BY AUTOMATED COUNT: 22.4 % (ref 11.5–14.5)
EST. GFR  (NO RACE VARIABLE): 4.4 ML/MIN/1.73 M^2
GLUCOSE SERPL-MCNC: 74 MG/DL (ref 70–110)
HCT VFR BLD AUTO: 51.3 % (ref 37–48.5)
HGB BLD-MCNC: 14.7 G/DL (ref 12–16)
IMM GRANULOCYTES # BLD AUTO: 0.01 K/UL (ref 0–0.04)
IMM GRANULOCYTES NFR BLD AUTO: 0.2 % (ref 0–0.5)
LACTATE SERPL-SCNC: 2.1 MMOL/L (ref 0.5–1.9)
LYMPHOCYTES # BLD AUTO: 1.9 K/UL (ref 1–4.8)
LYMPHOCYTES NFR BLD: 35.3 % (ref 18–48)
MAGNESIUM SERPL-MCNC: 2.3 MG/DL (ref 1.6–2.6)
MCH RBC QN AUTO: 25.7 PG (ref 27–31)
MCHC RBC AUTO-ENTMCNC: 28.7 G/DL (ref 32–36)
MCV RBC AUTO: 90 FL (ref 82–98)
MONOCYTES # BLD AUTO: 0.5 K/UL (ref 0.3–1)
MONOCYTES NFR BLD: 9.5 % (ref 4–15)
NEUTROPHILS # BLD AUTO: 2.1 K/UL (ref 1.8–7.7)
NEUTROPHILS NFR BLD: 38.2 % (ref 38–73)
NRBC BLD-RTO: 0 /100 WBC
PLATELET # BLD AUTO: 167 K/UL (ref 150–450)
PLATELET BLD QL SMEAR: ABNORMAL
PMV BLD AUTO: 10.2 FL (ref 9.2–12.9)
POTASSIUM SERPL-SCNC: 4.7 MMOL/L (ref 3.5–5.1)
PROT SERPL-MCNC: 8.4 G/DL (ref 6–8.4)
RBC # BLD AUTO: 5.71 M/UL (ref 4–5.4)
SODIUM SERPL-SCNC: 134 MMOL/L (ref 136–145)
WBC # BLD AUTO: 5.46 K/UL (ref 3.9–12.7)

## 2024-09-03 PROCEDURE — 25000003 PHARM REV CODE 250: Performed by: NURSE PRACTITIONER

## 2024-09-03 PROCEDURE — 63600175 PHARM REV CODE 636 W HCPCS: Performed by: NURSE PRACTITIONER

## 2024-09-03 PROCEDURE — 80053 COMPREHEN METABOLIC PANEL: CPT | Performed by: NURSE PRACTITIONER

## 2024-09-03 PROCEDURE — G0378 HOSPITAL OBSERVATION PER HR: HCPCS

## 2024-09-03 PROCEDURE — 63600175 PHARM REV CODE 636 W HCPCS: Performed by: INTERNAL MEDICINE

## 2024-09-03 PROCEDURE — 97530 THERAPEUTIC ACTIVITIES: CPT | Mod: CQ

## 2024-09-03 PROCEDURE — 96375 TX/PRO/DX INJ NEW DRUG ADDON: CPT

## 2024-09-03 PROCEDURE — 83605 ASSAY OF LACTIC ACID: CPT | Performed by: EMERGENCY MEDICINE

## 2024-09-03 PROCEDURE — 85025 COMPLETE CBC W/AUTO DIFF WBC: CPT | Performed by: NURSE PRACTITIONER

## 2024-09-03 PROCEDURE — 83735 ASSAY OF MAGNESIUM: CPT | Performed by: NURSE PRACTITIONER

## 2024-09-03 PROCEDURE — 36415 COLL VENOUS BLD VENIPUNCTURE: CPT | Performed by: EMERGENCY MEDICINE

## 2024-09-03 PROCEDURE — 96376 TX/PRO/DX INJ SAME DRUG ADON: CPT

## 2024-09-03 RX ORDER — LORAZEPAM 2 MG/ML
2 INJECTION INTRAMUSCULAR ONCE AS NEEDED
Status: COMPLETED | OUTPATIENT
Start: 2024-09-03 | End: 2024-09-03

## 2024-09-03 RX ADMIN — CALCIUM ACETATE 1334 MG: 667 CAPSULE ORAL at 07:09

## 2024-09-03 RX ADMIN — APIXABAN 2.5 MG: 2.5 TABLET, FILM COATED ORAL at 09:09

## 2024-09-03 RX ADMIN — HYDRALAZINE HYDROCHLORIDE 10 MG: 20 INJECTION INTRAMUSCULAR; INTRAVENOUS at 07:09

## 2024-09-03 RX ADMIN — LEVOTHYROXINE SODIUM 75 MCG: 0.03 TABLET ORAL at 05:09

## 2024-09-03 RX ADMIN — AMIODARONE HYDROCHLORIDE 100 MG: 100 TABLET ORAL at 09:09

## 2024-09-03 RX ADMIN — LATANOPROST 1 DROP: 50 SOLUTION/ DROPS OPHTHALMIC at 07:09

## 2024-09-03 RX ADMIN — LORAZEPAM 2 MG: 2 INJECTION INTRAMUSCULAR; INTRAVENOUS at 10:09

## 2024-09-03 NOTE — PROGRESS NOTES
[facility]  Adult Nutrition   Progress Note (Initial Assessment)     SUMMARY     Recommendations  Recommendation/Intervention: 1. Continue Renal diet as tolerated. 2. Recommend Nepro with meals. 3.  to obtain daily menu choices.  Goals: 1. Intake to be >/= 75% EEN / EPN by follow upl.  Nutrition Goal Status: new    Nutrition Diagnosis PES Statement: Moderate Protein Calorie Malnutrition in context of acute illness or injury related to metabolic encephalopathy on chronic disease ESRD as evidenced by < 75% EEN for >/= 7 days; weight loss > 7.5% in 3 months; mild fat loss and mild muscle wasting.    Dietitian Rounds Brief  RD screen for MST 3. Pt unsure of weight loss; reported poor intake due to decreased appetite. Patient sleeping soundly at visit. Per Epic, patient with 9%, 9 lb weight loss in 3 months.and RD visual as unable to arose patient for NFPE. Will add Nepro and follow for interview.     Nutrition Related Social Determinants of Health: SDOH: Unable to assess at this time.     Malnutrition Assessment  Malnutrition Context: acute illness or injury  Malnutrition Level: moderate          Weight Loss (Malnutrition): greater than 7.5% in 3 months  Energy Intake (Malnutrition): less than 75% for greater than 7 days  Subcutaneous Fat (Malnutrition): mild depletion  Muscle Mass (Malnutrition): mild depletion  Fluid Accumulation (Malnutrition): mild   Orbital Region (Subcutaneous Fat Loss): mild depletion  Upper Arm Region (Subcutaneous Fat Loss): mild depletion   Pentecostalism Region (Muscle Loss): mild depletion  Clavicle Bone Region (Muscle Loss): mild depletion                 Diet order:   Current Diet Order: Renal diet                 Evaluation of Received Nutrient/Fluid Intake  Energy Calories Required: not meeting needs  Protein Required: not meeting needs  Fluid Required: meeting needs  Tolerance: tolerating     % Intake of Estimated Energy Needs: 0 - 25 %  % Meal Intake: 0 - 25  "%      Intake/Output Summary (Last 24 hours) at 9/3/2024 1514  Last data filed at 9/3/2024 0730  Gross per 24 hour   Intake 750 ml   Output 2400 ml   Net -1650 ml        Anthropometrics  Temp: 97.6 °F (36.4 °C)  Height Method: Stated  Height: 5' 5" (165.1 cm)  Height (inches): 65 in  Weight Method: Bed Scale  Weight: 44.5 kg (98 lb)  Weight (lb): 98 lb  Ideal Body Weight (IBW), Female: 125 lb  % Ideal Body Weight, Female (lb): 78.4 %  BMI (Calculated): 16.3  BMI Grade: 16 - 16.9 protein-energy malnutrition grade II       Estimated/Assessed Needs  Weight Used For Calorie Calculations: 44.5 kg (98 lb 1.7 oz)  Energy Calorie Requirements (kcal): 4914-3218 kcal/day (30-35 kcal/kg)  Energy Need Method: Kcal/kg  Protein Requirements: 53-67 gm/day (1.2-1.5 gm/kg)  Weight Used For Protein Calculations: 44.5 kg (98 lb 1.7 oz)  Fluid Requirements (mL): 24 hour UOP + 1000 mL or per MD  Estimated Fluid Requirement Method: other (see comments)  RDA Method (mL): 1335       Reason for Assessment  Reason For Assessment: identified at risk by screening criteria (MST 3)  Diagnosis: renal disease (metabolic encephalopathy in patient with ESRD)  Relevant Medical History: ESRD on HD, hypertension, gastroparesis, atrial fibrillation, previous CVA, hyperlipidemia  Interdisciplinary Rounds: did not attend  General Information Comments: Patietn admitted for evaluation of gradually worsening generalized weakness.(MD H&P)  Nutrition Discharge Planning: Renal diet    Nutrition/Diet History  Spiritual, Cultural Beliefs, Cheondoism Practices, Values that Affect Care: no  Food Allergies: fish, peanut  Factors Affecting Nutritional Intake: decreased appetite    Nutrition Risk Screen  Nutrition Risk Screen: reduced oral intake over the last month     MST Score: 3  Have you recently lost weight without trying?: Unsure  Weight loss score: 2  Have you been eating poorly because of a decreased appetite?: Yes  Appetite score: 1       Weight History:  Wt " Readings from Last 10 Encounters:   09/02/24 44.5 kg (98 lb)   08/13/24 47.6 kg (104 lb 15 oz)   08/09/24 46.3 kg (102 lb)   07/30/24 44.9 kg (99 lb)   07/19/24 47.8 kg (105 lb 6.1 oz)   07/11/24 46.3 kg (102 lb)   06/28/24 48.7 kg (107 lb 5.8 oz)   06/28/24 48.7 kg (107 lb 5.8 oz)   06/06/24 46.7 kg (103 lb)   06/03/24 45.1 kg (99 lb 6.8 oz)        Lab/Procedures/Meds: Pertinent Labs/Meds Reviewed    Medications:Pertinent Medications Reviewed  Scheduled Meds:   amiodarone  100 mg Oral Daily    apixaban  2.5 mg Oral BID    atorvastatin  40 mg Oral QHS    calcium acetate(phosphat bind)  1,334 mg Oral TID WM    latanoprost  1 drop Both Eyes QHS    levothyroxine  75 mcg Oral Before breakfast     Continuous Infusions:  PRN Meds:.  Current Facility-Administered Medications:     acetaminophen, 650 mg, Oral, Q8H PRN    acetaminophen, 650 mg, Oral, Q4H PRN    aluminum-magnesium hydroxide-simethicone, 30 mL, Oral, QID PRN    dextrose 50%, 12.5 g, Intravenous, PRN    dextrose 50%, 25 g, Intravenous, PRN    glucagon (human recombinant), 1 mg, Intramuscular, PRN    glucose, 16 g, Oral, PRN    glucose, 24 g, Oral, PRN    hydrALAZINE, 10 mg, Intravenous, Q6H PRN    HYDROcodone-acetaminophen, 1 tablet, Oral, Q6H PRN    melatonin, 6 mg, Oral, Nightly PRN    naloxone, 0.02 mg, Intravenous, PRN    ondansetron, 4 mg, Intravenous, Q6H PRN    senna-docusate 8.6-50 mg, 1 tablet, Oral, BID PRN    sodium chloride 0.9%, 10 mL, Intravenous, Q12H PRN    Labs: Pertinent Labs Reviewed  Clinical Chemistry:  Recent Labs   Lab 09/02/24  0745 09/03/24  1027   * 134*   K 5.0 4.7   CL 91* 100   CO2 29 22*   GLU 98 74   BUN 58* 35*   CREATININE 11.5* 8.3*   CALCIUM 10.0 10.6*   PROT 7.6 8.4   ALBUMIN 4.3 4.6   BILITOT 0.7 1.0   ALKPHOS 65 70   AST 26 25   ALT 11 9*   ANIONGAP 15 12   MG  --  2.3     CBC:   Recent Labs   Lab 09/03/24  1027   WBC 5.46   RBC 5.71*   HGB 14.7   HCT 51.3*      MCV 90   MCH 25.7*   MCHC 28.7*     Lipid  "Panel:  Recent Labs   Lab 09/02/24  0745   CHOL 189   HDL 82*   LDLCALC 84.6   TRIG 112   CHOLHDL 43.4     Cardiac Profile:  Recent Labs   Lab 09/02/24  0745   BNP 1,126*  1,126*     Inflammatory Labs:  No results for input(s): "CRP" in the last 168 hours.  Diabetes:  No results for input(s): "HGBA1C", "POCTGLUCOSE" in the last 168 hours.  Thyroid & Parathyroid:  Recent Labs   Lab 09/02/24  0745   TSH 5.975*   FREET4 0.98       Monitor and Evaluation  Food and Nutrient Intake: energy intake, food and beverage intake  Food and Nutrient Adminstration: diet order  Knowledge/Beliefs/Attitudes: food and nutrition knowledge/skill  Physical Activity and Function: nutrition-related ADLs and IADLs  Anthropometric Measurements: weight, weight change, body mass index  Biochemical Data, Medical Tests and Procedures: gastrointestinal profile, electrolyte and renal panel, glucose/endocrine profile, inflammatory profile, lipid profile  Nutrition-Focused Physical Findings: overall appearance     Nutrition Risk  Level of Risk/Frequency of Follow-up:  (1 x / week)     Nutrition Follow-Up  RD Follow-up?: Yes            "

## 2024-09-03 NOTE — SUBJECTIVE & OBJECTIVE
Interval History:  Patient is sitting up in bed, no complaints on exam.  She is more awake and alert this morning and speech is clearer.  No family is at bedside.  Tolerated HD yesterday awaiting MRI today.    Review of Systems   Constitutional:  Negative for activity change, appetite change and fever.   Cardiovascular:  Negative for chest pain and leg swelling.   Gastrointestinal:  Negative for abdominal distention, abdominal pain, anal bleeding, blood in stool, constipation, diarrhea, nausea and vomiting.   Neurological:  Negative for dizziness, weakness, light-headedness and numbness.     Objective:     Vital Signs (Most Recent):  Temp: 97.6 °F (36.4 °C) (09/03/24 0729)  Pulse: (!) 56 (09/03/24 0729)  Resp: 17 (09/03/24 0729)  BP: (!) 176/73 (09/03/24 0729)  SpO2: 98 % (09/03/24 0729) Vital Signs (24h Range):  Temp:  [97.1 °F (36.2 °C)-97.7 °F (36.5 °C)] 97.6 °F (36.4 °C)  Pulse:  [51-65] 56  Resp:  [17-19] 17  SpO2:  [97 %-100 %] 98 %  BP: (109-176)/(53-74) 176/73     Weight: 44.5 kg (98 lb)  Body mass index is 16.31 kg/m².    Intake/Output Summary (Last 24 hours) at 9/3/2024 1102  Last data filed at 9/3/2024 0730  Gross per 24 hour   Intake 750 ml   Output 2400 ml   Net -1650 ml         Physical Exam  Constitutional:       General: She is not in acute distress.     Appearance: She is not toxic-appearing.   HENT:      Head: Normocephalic.      Mouth/Throat:      Mouth: Mucous membranes are moist.      Pharynx: Oropharynx is clear.   Eyes:      Extraocular Movements: Extraocular movements intact.      Conjunctiva/sclera: Conjunctivae normal.   Cardiovascular:      Rate and Rhythm: Normal rate and regular rhythm.      Pulses: Normal pulses.      Heart sounds: Normal heart sounds.   Pulmonary:      Effort: Pulmonary effort is normal.      Breath sounds: Normal breath sounds.   Abdominal:      General: Bowel sounds are normal. There is no distension.      Palpations: Abdomen is soft.      Tenderness: There is no  abdominal tenderness. There is no guarding.   Skin:     General: Skin is warm.      Capillary Refill: Capillary refill takes less than 2 seconds.   Neurological:      Mental Status: She is alert. Mental status is at baseline.   Psychiatric:         Mood and Affect: Mood normal.             Significant Labs: All pertinent labs within the past 24 hours have been reviewed.  Recent Lab Results         09/03/24  1027        Magnesium  2.3               Significant Imaging: I have reviewed all pertinent imaging results/findings within the past 24 hours.

## 2024-09-03 NOTE — PT/OT/SLP PROGRESS
Physical Therapy Treatment    Patient Name:  Tyra Isaac   MRN:  [id number]    Recommendations:     Discharge Recommendations: Moderate Intensity Therapy  Discharge Equipment Recommendations: none  Barriers to discharge:  increased assist with mobility, decreased activity tolerance, balance deficits, tremors    Assessment:     Tyra Isaac is a 84 y.o. female admitted with a medical diagnosis of Encephalopathy, metabolic.  She presents with the following impairments/functional limitations: weakness, impaired endurance, impaired functional mobility, gait instability, impaired balance, decreased lower extremity function, decreased safety awareness, pain, impaired cardiopulmonary response to activity.    Pt agreeable to visit. No tremors at rest. Tremors with initiation of mobility.    Pt required mod assist x 1-2 persons for rolling side to side for hygiene. Pt required mod assist x 2 for supine to sit transfer with tremors.    Pt required min-mod assist for sitting balance due to tremors progressing to contact guard assist as tremors subsided. Tremors reoccurred with scooting hips forward and having pt weight shift anteriorly.    Attempted sit to stand transfer with max assist x 2 and RW. Therapist and tech blocking pt' knees and holding RW in place. Pt unable to achieve full stand due to severity of tremors.    Pt returned mod assist x 2 to return to supine and to scoot towards HOB.    Tremors subside once pt was in bed and lying still.    Rehab Prognosis: Fair; patient would benefit from acute skilled PT services to address these deficits and reach maximum level of function.    Recent Surgery: * No surgery found *      Plan:     During this hospitalization, patient to be seen 5 x/week to address the identified rehab impairments via therapeutic activities, gait training, therapeutic exercises, neuromuscular re-education and progress toward the following goals:    Plan of Care Expires:   10/02/24    Subjective     Chief Complaint: none verbalized, however pt seemed fearful with all mobility  Patient/Family Comments/goals: to get better  Pain/Comfort:  Pain Rating 1: 0/10      Objective:     Communicated with nurse prior to session.  Patient found HOB elevated with peripheral IV, telemetry, PureWick, bed alarm upon PT entry to room.     General Precautions: Standard, fall  Orthopedic Precautions: N/A  Braces: N/A  Respiratory Status: Room air     Functional Mobility:  Bed Mobility:     Rolling Left:  moderate assistance and of 1-2 persons  Rolling Right: moderate assistance and of 1-2 persons  Scooting: moderate assistance and of 2 persons  Supine to Sit: moderate assistance and of 2 persons  Sit to Supine: moderate assistance and of 2 persons  Transfers:     Sit to Stand:  maximal assistance and of 2 persons with rolling walker and unable to achieve full stand      AM-PAC 6 CLICK MOBILITY          Treatment & Education:  Pt educated on importance of time OOB, importance of intermittent mobility, safe techniques for transfers/ambulation, discharge recommendations/options, and use of call light for assistance and fall prevention.      Patient left HOB elevated with all lines intact, call button in reach, and bed alarm on..    GOALS:   Multidisciplinary Problems       Physical Therapy Goals          Problem: Physical Therapy    Goal Priority Disciplines Outcome Goal Variances Interventions   Physical Therapy Goal     PT, PT/OT Progressing     Description: Goals to be met by: 10/2/24     Patient will increase functional independence with mobility by performin. Supine to sit with MInimal Assistance  2. Sit to stand transfer with Minimal Assistance  3. Bed to chair transfer with Minimal Assistance using Rolling Walker  4. Gait  x 25 feet with Minimal Assistance using Rolling Walker.                                 Time Tracking:     PT Received On: 24  PT Start Time: 951     PT Stop Time:  1018  PT Total Time (min): 27 min     Billable Minutes: Therapeutic Activity 27    Treatment Type: Treatment  PT/PTA: PTA     Number of PTA visits since last PT visit: 1 09/03/2024

## 2024-09-03 NOTE — CARE UPDATE
09/02/24 1942   Patient Assessment/Suction   Level of Consciousness (AVPU) alert   Respiratory Effort Normal;Unlabored   Expansion/Accessory Muscles/Retractions no use of accessory muscles;no retractions   Rhythm/Pattern, Respiratory unlabored;pattern regular;no shortness of breath reported   PRE-TX-O2   Device (Oxygen Therapy) room air   SpO2 100 %   Pulse Oximetry Type Intermittent   $ Pulse Oximetry - Multiple Charge Pulse Oximetry - Multiple

## 2024-09-03 NOTE — PROGRESS NOTES
[facility] Medicine  Progress Note    Patient Name: Tyra Isaac  MRN: [id number]  Patient Class: OP- Observation   Admission Date: 9/2/2024  Length of Stay: 0 days  Attending Physician: Sharri Foley MD  Primary Care Provider: Kalpesh Goel MD        Subjective:     Principal Problem:Encephalopathy, metabolic        HPI:  The patient is an 85-year-old female with a past medical history of end-stage renal disease, hypertension, gastroparesis, atrial fibrillation, previous CVA, hyperlipidemia, who presented to emergency department for evaluation of gradually worsening generalized weakness.  Patient was recently admitted to the hospital in August and was discharged on August 23, 2024 after an admission for confusion weakness and tremors.  Patient was evaluated at that time and she had recently started on Reglan may which was attributed to causing her facial movements and tremors.  Patient continued to improve with rehab efforts and was discharged home.  Patient's son reports that the patient has been doing very well at home recently.  She was participating in PT and OT and eating well up until the past few days.  Patient's son reports that on Friday (today is Monday) that the patient became progressively more weak with a decreased appetite and garbled speech.  Patient was to have dialysis as she is HD Monday Wednesday Friday however she was too sick to go to dialysis today.  Dialysis clinic recommended patient son to bring patient to the ER for evaluation.  Patient is son denies any fever chills nausea vomiting diarrhea shortness of breath cough or any other complaints.    Patient was evaluated in the emergency department.  Patient was initially hypertensive upon arrival with systolic blood pressure in the 200s.  Patient was given clonidine and hydralazine in the emergency department which improved her blood pressure to systolic 129 on assessment.  Patient's labs consistent with  end-stage renal disease.  CT of brain without acute findings, chest x-ray without acute findings.  COVID and influenza screening negative.  TSH mildly elevated at 5.9 which is increased from 3 weeks ago when it was 3.748.  Ammonia negative, troponin mildly elevated at 31.3, this has also improved from 3 weeks ago when it was 177.  Patient will be admitted to the hospital for further evaluation with consultation to neurology and nephrology.    Overview/Hospital Course:  85-year-old female with a past medical history of ESRD on HD Monday Wednesday Friday, hypertension, gastroparesis, atrial fibrillation, previous CVA, hyperlipidemia who presented to the ER for evaluation of generalized weakness and slurred speech.  Patient was evaluated in the emergency department, where she was initially found to be hypertensive with a systolic blood pressure of 200.  CT without acute findings.  Labs consistent with end-stage renal disease.  Patient was admitted for further observation and evaluation by nephrology and neurology.  She was monitored on telemetry monitoring and remained hemodynamically stable.  Patient tolerated hemodialysis without issue.  Patient's mental status continues to slowly improve and patient's daughters reported to nursing staff that she is near her baseline.  Neurology recommended MRI of brain which will be obtained.    Interval History:  Patient is sitting up in bed, no complaints on exam.  She is more awake and alert this morning and speech is clearer.  No family is at bedside.  Tolerated HD yesterday awaiting MRI today.    Review of Systems   Constitutional:  Negative for activity change, appetite change and fever.   Cardiovascular:  Negative for chest pain and leg swelling.   Gastrointestinal:  Negative for abdominal distention, abdominal pain, anal bleeding, blood in stool, constipation, diarrhea, nausea and vomiting.   Neurological:  Negative for dizziness, weakness, light-headedness and numbness.      Objective:     Vital Signs (Most Recent):  Temp: 97.6 °F (36.4 °C) (09/03/24 0729)  Pulse: (!) 56 (09/03/24 0729)  Resp: 17 (09/03/24 0729)  BP: (!) 176/73 (09/03/24 0729)  SpO2: 98 % (09/03/24 0729) Vital Signs (24h Range):  Temp:  [97.1 °F (36.2 °C)-97.7 °F (36.5 °C)] 97.6 °F (36.4 °C)  Pulse:  [51-65] 56  Resp:  [17-19] 17  SpO2:  [97 %-100 %] 98 %  BP: (109-176)/(53-74) 176/73     Weight: 44.5 kg (98 lb)  Body mass index is 16.31 kg/m².    Intake/Output Summary (Last 24 hours) at 9/3/2024 1102  Last data filed at 9/3/2024 0730  Gross per 24 hour   Intake 750 ml   Output 2400 ml   Net -1650 ml         Physical Exam  Constitutional:       General: She is not in acute distress.     Appearance: She is not toxic-appearing.   HENT:      Head: Normocephalic.      Mouth/Throat:      Mouth: Mucous membranes are moist.      Pharynx: Oropharynx is clear.   Eyes:      Extraocular Movements: Extraocular movements intact.      Conjunctiva/sclera: Conjunctivae normal.   Cardiovascular:      Rate and Rhythm: Normal rate and regular rhythm.      Pulses: Normal pulses.      Heart sounds: Normal heart sounds.   Pulmonary:      Effort: Pulmonary effort is normal.      Breath sounds: Normal breath sounds.   Abdominal:      General: Bowel sounds are normal. There is no distension.      Palpations: Abdomen is soft.      Tenderness: There is no abdominal tenderness. There is no guarding.   Skin:     General: Skin is warm.      Capillary Refill: Capillary refill takes less than 2 seconds.   Neurological:      Mental Status: She is alert. Mental status is at baseline.   Psychiatric:         Mood and Affect: Mood normal.             Significant Labs: All pertinent labs within the past 24 hours have been reviewed.  Recent Lab Results         09/03/24  1027        Magnesium  2.3               Significant Imaging: I have reviewed all pertinent imaging results/findings within the past 24 hours.    Assessment/Plan:      * Encephalopathy,  metabolic  Patient with HTN and ESRD history   CT brain negative for acute findings  CBC and CMP consistent with end-stage renal disease   TSH mildly elevated  Ammonia 14  UA pending  Neurology consulted - recommends MRI of brain today  Fall precautions  Consult PT/OT   Supportive care       Atrial fibrillation  Patient with Persistent (7 days or more) atrial fibrillation which is controlled currently with Amiodarone. Patient is currently in sinus rhythm.[id number] Score: 4. HASBLED Score: 3. Anticoagulation indicated. Anticoagulation done with Eliquis .    Hypertensive cardiovascular-renal disease, stage 5 chronic kidney disease or end stage renal disease, with heart failure  Creatine stable for now. BMP reviewed- noted Estimated Creatinine Clearance: 2.6 mL/min (A) (based on SCr of 11.5 mg/dL (H)). according to latest data. Based on current GFR, CKD stage is end stage.  Monitor UOP and serial BMP and adjust therapy as needed. Renally dose meds. Avoid nephrotoxic medications and procedures. Consult to nephrology for HD MWF     Elevated TSH    TSH 5, was 3 on prior admission a few weeks ago  Start Synthroid 75 mcg daily      VTE Risk Mitigation (From admission, onward)           Ordered     apixaban tablet 2.5 mg  2 times daily         09/02/24 1146     IP VTE HIGH RISK PATIENT  Once         09/02/24 1005     Place sequential compression device  Until discontinued         09/02/24 1005                    Discharge Planning   ILAN: 9/4/2024     Code Status: Full Code   Is the patient medically ready for discharge?:     Reason for patient still in hospital (select all that apply): Laboratory test, Treatment, Consult recommendations, and PT / OT recommendations  Await MRI brain -  Discharge Plan A: Home Health                  KELBY Montoya  Department of Hospital Medicine   [facility]

## 2024-09-03 NOTE — PLAN OF CARE
Problem: Malnutrition, Moderate malnutrition in the setting of acute illness, metabolic encephalopathy on chronic disease ESRD  Goal: Improved Nutritional Intake  Intervention: Promote and Optimize Oral Intake  Flowsheets (Taken 9/3/2024 1512)  Nutrition Interventions: supplemental drinks provided Nepro with meals.

## 2024-09-03 NOTE — PLAN OF CARE
09/03/24 1110   WARD Message   Medicare Outpatient and Observation Notification regarding financial responsibility Given to patient/caregiver;Explained to patient/caregiver;Signed/date by patient/caregiver   Date WARD was signed 09/03/24   Time WARD was signed 1111

## 2024-09-03 NOTE — PROGRESS NOTES
Critical access hospital  Department of Neurology  Progress Note  Date: 2024 10:44 AM          Patient Name: Tyra Isaac   MRN: [id number]   : 1940    AGE: 84 y.o.    LOS: 0 days Hospital Day: 2  Admit date: 2024  6:54 AM         2024: No acute events overnight. Patient was seen and examined by me this morning.  Patient received a dose of Ativan for MRI and has been drowsy since then.  Opens eyes to stimulus however does not answer to questions or follow commands.  MRI brain showed no acute abnormalities however was degraded by motion.    Vitals:  Patient Vitals for the past 24 hrs:   BP Temp Temp src Pulse Resp SpO2   24 0729 (!) 176/73 97.6 °F (36.4 °C) Oral (!) 56 17 98 %   24 0322 (!) 134/56 97.6 °F (36.4 °C) Axillary (!) 58 17 98 %   24 2310 134/71 97.6 °F (36.4 °C) Oral (!) 53 18 99 %   24 (!) 145/69 97.7 °F (36.5 °C) Oral (!) 57 18 99 %   24 -- -- -- -- -- 100 %   24 1730 109/60 97.4 °F (36.3 °C) Oral 64 18 97 %   24 1715 111/68 -- -- 60 -- --   24 1700 136/69 -- -- 61 -- --   24 1630 138/62 -- -- 60 -- --   24 1600 (!) 139/58 -- -- (!) 57 -- --   24 1530 121/69 -- -- (!) 57 -- --   24 1500 (!) 123/53 -- -- 65 -- --   24 1430 (!) 148/55 -- -- (!) 54 -- --   24 1415 (!) 170/59 -- -- (!) 51 -- --   24 1400 (!) 160/74 97.2 °F (36.2 °C) Axillary (!) 52 18 98 %   24 1300 (!) 160/71 97.1 °F (36.2 °C) Oral 63 19 98 %   24 1132 (!) 119/56 -- -- -- -- --     PHYSICAL EXAM:     GENERAL APPEARANCE:  Drowsy, opens eyes to stimulus however does not answer to questions or follow commands.  HEENT: Normocephalic and atraumatic. PERRL. Oropharynx unremarkable.  PULM: Comfortable on room air.  CV: RRR.  ABDOMEN: Soft, nontender.  EXTREMITIES: No signs of vascular compromise. Pulses present. No cyanosis, clubbing or edema.  SKIN: Clear; no rashes, lesions or skin breaks in exposed areas.  "     NEURO:   MENTAL STATUS: Patient is drowsy, opens eyes to stimulus.  Does not answer questions or follow commands.  CRANIAL NERVES II-XII: Pupils equal, round and reactive to light.  Face is grossly symmetric cranial nerves can not be tested  MOTOR: Normal bulk. Tone normal and symmetrical throughout.  No abnormal movements. No tremor.   Strength could not be tested  REFLEXES: DTRs 1+throughout.   SENSATION:  Could not be tested  COORDINATION:  Could not be tested  STATION: [place] deferred.  GAIT: Deferred.    CURRENT SCHEDULED MEDICATIONS:   amiodarone  100 mg Oral Daily    apixaban  2.5 mg Oral BID    atorvastatin  40 mg Oral QHS    calcium acetate(phosphat bind)  1,334 mg Oral TID WM    latanoprost  1 drop Both Eyes QHS    levothyroxine  75 mcg Oral Before breakfast     CURRENT INFUSIONS:    DATA:  Recent Labs   Lab 09/02/24  0745   *   K 5.0   CL 91*   CO2 29   BUN 58*   CREATININE 11.5*   GLU 98   CALCIUM 10.0   AST 26   ALT 11   AMMONIA 14     Recent Labs   Lab 09/02/24  0745   WBC 5.06   HGB 12.4   HCT 42.0        No results found for: "PROTEINCSF", "GLUCCSF", "WBCCSF", "RBCCSF", "LYMPHCSF", "PMNCSF"  Hemoglobin A1C   Date Value Ref Range Status   06/28/2024 5.1 4.5 - 6.2 % Final     Comment:     According to ADA guidelines, hemoglobin A1C <7.0% represents  optimal control in non-pregnant diabetic patients.  Different  metrics may apply to specific populations.   Standards of Medical Care in Diabetes - 2016.    For the purpose of screening for the presence of diabetes:  <5.7%     Consistent with the absence of diabetes  5.7-6.4%  Consistent with increasing risk for diabetes   (prediabetes)  >or=6.5%  Consistent with diabetes    Currently no consensus exists for use of hemoglobin A1C  for diagnosis of diabetes for children.     01/26/2022 5.3 4.5 - 6.2 % Final     Comment:     According to ADA guidelines, hemoglobin A1C <7.0% represents  optimal control in non-pregnant diabetic patients.  " Different  metrics may apply to specific populations.   Standards of Medical Care in Diabetes - 2016.    For the purpose of screening for the presence of diabetes:  <5.7%     Consistent with the absence of diabetes  5.7-6.4%  Consistent with increasing risk for diabetes   (prediabetes)  >or=6.5%  Consistent with diabetes    Currently no consensus exists for use of hemoglobin A1C  for diagnosis of diabetes for children.              I have personally reviewed and interpreted the pertinent imaging and lab results.  Imaging Results              X-Ray Chest AP Portable (Final result)  Result time 09/02/24 07:45:28      Final result by Aleksey Atkins MD (09/02/24 07:45:28)                   Impression:      No evidence of acute cardiopulmonary disease.      Electronically signed by: Aleksey Atkins  Date:    09/02/2024  Time:    07:45               Narrative:    EXAMINATION:  XR CHEST AP PORTABLE    CLINICAL HISTORY:  Sepsis; weakness and slurred speech    FINDINGS:  Portable chest radiograph at 07:11 hours compared to prior exams shows the cardiomediastinal silhouette and pulmonary vasculature are within normal limits.  There are aortic vascular calcifications.    The lungs are normally expanded, with no consolidation, large pleural effusion, or evidence of pulmonary edema. No pneumothorax.  The bones are diffusely osteopenic.                                       CT HEAD FOR STROKE (Final result)  Result time 09/02/24 07:14:33      Final result by Aleksey Atkins MD (09/02/24 07:14:33)                   Impression:      No acute intracranial hemorrhage, mass effect, or loss of gray-white differentiation.      Electronically signed by: Aleksey Atkins  Date:    09/02/2024  Time:    07:14               Narrative:    EXAMINATION:  CT HEAD FOR STROKE    CLINICAL HISTORY:  Neuro deficit, acute, stroke suspected;    TECHNIQUE:  Thin axial noncontrast imaging through the brain was performed.    FINDINGS:  Comparison to multiple  prior exams.  There is no acute intracranial hemorrhage, with no mass effect or abnormal extra-axial fluid.  Extensive nonspecific hypoattenuation involves the white matter, with gray-white differentiation maintained.    There is stable generalized prominence of the cortical sulci and ventricles. The cerebellum and brainstem are unremarkable.  There are dense carotid siphon vascular calcifications.  The visualized paranasal sinuses and mastoid air cells are clear. No acute fracture or destructive osseous lesion.                                           ASSESSMENT AND PLAN:          Encephalopathy   MICHELLE on CKD  Hypertensive emergency  Paroxysmal AFib     Plan:   Encephalopathy likely secondary to hypertensive emergency and metabolic derangements with MICHELLE on CKD.  Less likely to be an acute intracranial pathology however can not completely rule out.    MRI brain showed no acute intracranial abnormality.    Slowly lower blood pressure to normotensive range.  Avoid rapid lowering  Continue with Eliquis, Lipitor for stroke prevention.    PT OT and speech therapy evaluate and treat.    Avoid sedating medications anticholinergics.    Will follow            Rafael Wagner MD  Neurology/vascular Neurology  Date of Service: 09/03/2024  10:44 AM    Please note: This note was transcribed using voice recognition software. Because of this technology there are often uinintended grammatical, spelling, and other transcription errors. Please disregard these errors.

## 2024-09-03 NOTE — PLAN OF CARE
[facility]  Initial Discharge Assessment       Primary Care Provider: Kalpesh Goel MD    Admission Diagnosis: Encephalopathy [G93.40]    Admission Date: 9/2/2024  Expected Discharge Date: 9/4/2024    Assessment and facesheet verification done at bedside. All demographic information correct in chart. Patient dependent on equipment for ADL's. Resides with adult children . Patient receives dialysis at [facility] on [address] MWF at 6 am and was receiving HH with [facility].   Chao Isaac will transport pt home from facility when dc'd . Needs for time of DC TBD.   Transition of Care Barriers: None    Payor: HU[employer]NA MANAGED MEDICARE / Plan: HU[employer]NA MEDICARE HMO / Product Type: Capitation /     Extended Emergency Contact Information  Primary Emergency Contact: Raffi Isaac  Address: [address] [place] [address] [facility]  Home Phone: [phone]  Mobile Phone: [phone]  Relation: Son   needed? No  Secondary Emergency Contact: Marcelo Isaac  Mobile Phone: [phone]  Relation: Son    Discharge Plan A: Home Health  Discharge Plan B: Home Health      [facility] [place], [place] - [address]  [address]  [facility] [address]  Phone: [phone] Fax: [phone]    [facility] [place] - [address]  [address] [place] [address]  Phone: [phone] Fax: [phone]      Initial Assessment (most recent)       Adult Discharge Assessment - 09/03/24 1059          Discharge Assessment    Assessment Type Discharge Planning Assessment     Confirmed/corrected address, phone number and insurance Yes     Confirmed Demographics Correct on Facesheet     Source of Information family     When was your last doctors appointment? 08/03/24     Does patient/caregiver understand observation status Yes     Communicated ILAN with patient/caregiver Yes     Reason For Admission metabolic encephalopathy     People in Home  [Doing Well] : is doing well [Excellent Pain Control] : has excellent pain control child(aron), adult     Do you expect to return to your current living situation? Yes     Do you have help at home or someone to help you manage your care at home? Yes     Who are your caregiver(s) and their phone number(s)? Raffi Isaac ( son ) [phone]     Prior to hospitilization cognitive status: Unable to Assess     Current cognitive status: Unable to Assess     Walking or Climbing Stairs Difficulty yes     Walking or Climbing Stairs ambulation difficulty, requires equipment     Dressing/Bathing Difficulty yes     Dressing/Bathing bathing difficulty, requires equipment     Home Accessibility wheelchair accessible     Equipment Currently Used at Home wheelchair;rollator;bedside commode     Readmission within 30 days? Yes     Do you currently have service(s) that help you manage your care at home? Yes     Name and Contact number of agency [facility]     Is the pt/caregiver preference to resume services with current agency Yes     Do you take prescription medications? Yes     Do you have prescription coverage? Yes     Do you have any problems affording any of your prescribed medications? No     Is the patient taking medications as prescribed? yes     Who is going to help you get home at discharge? Raffi Isaac ( son ) [phone]     How do you get to doctors appointments? family or friend will provide     Are you on dialysis? Yes     Dialysis Name and Scheduled days [facility] MWF @ 6 am     Do you take coumadin? No     Discharge Plan A Home Health     Discharge Plan B Home Health     Discharge Plan discussed with: Adult children     Transition of Care Barriers None        Physical Activity    On average, how many days per week do you engage in moderate to strenuous exercise (like a brisk walk)? 0 days     On average, how many minutes do you engage in exercise at this level? 0 min        Financial Resource Strain    How hard is it for you to pay for the very basics like food, housing, medical care,  [No Sign of Infection] : is showing no signs of infection and heating? Patient unable to answer        Housing Stability    In the last 12 months, was there a time when you were not able to pay the mortgage or rent on time? Patient unable to answer     At any time in the past 12 months, were you homeless or living in a shelter (including now)? Patient unable to answer        Transportation Needs    Has the lack of transportation kept you from medical appointments, meetings, work or from getting things needed for daily living? Patient unable to answer        Food Insecurity    Within the past 12 months, you worried that your food would run out before you got the money to buy more. Patient unable to answer     Within the past 12 months, the food you bought just didn't last and you didn't have money to get more. Patient unable to answer        Stress    Do you feel stress - tense, restless, nervous, or anxious, or unable to sleep at night because your mind is troubled all the time - these days? Patient unable to answer        Social Isolation    How often do you feel lonely or isolated from those around you?  Patient unable to answer        Alcohol Use    Q1: How often do you have a drink containing alcohol? Patient unable to answer     Q2: How many drinks containing alcohol do you have on a typical day when you are drinking? Patient unable to answer     Q3: How often do you have six or more drinks on one occasion? Patient unable to answer        Utilities    In the past 12 months has the electric, gas, oil, or water company threatened to shut off services in your home? Patient unable to answer        Health Literacy    How often do you need to have someone help you when you read instructions, pamphlets, or other written material from your doctor or pharmacy? Patient unable to respond                                 [Chills] : no chills [Fever] : no fever [Nausea] : no nausea [Vomiting] : no vomiting [Clean/Dry/Intact] : clean, dry and intact [Healed] : healed [Erythema] : not erythematous [Neuro Intact] : an unremarkable neurological exam [Vascular Intact] : ~T peripheral vascular exam normal [Negative Davi's] : maneuvers demonstrated a negative Davi's sign [None] : None [de-identified] : S/P ORIF right distal radius fracture 1/10/19  [de-identified] : 52 yo F ritika buckley presents S/P ORIF right distal radius fracture 1/10/19.  She continues to have significant stiffness of her wrist and fingers. She has not started therapy for ROM or strengthening.  [de-identified] : Physical Exam of the  RT wrist:  There is no erythema or drainage there are no signs of skin breakdown or infection. ROM Patient  cannot yet actively make a fist . The ROM of the RT wrist is 5 degrees wrist extension and 10 degrees wrist flexion. there is 60 degrees of pronation and supination. There is a normal neurovascular exam and normal capillary refill.   [de-identified] : 3 views of the right wrist taken today and reviewed by me show well fixed right distal radius fracture with hardware in good position no signs of mechanical failure  [de-identified] : S/P ORIF right distal radius fracture 1/10/19 \par - it is expressed to the patient in both english and German, the importance of initiating therapy.  She is instructed on home exercises\par - hand therapy \par - F/U in 4 weeks with XR\par

## 2024-09-03 NOTE — PLAN OF CARE
Referral sent via [facility] for COREY with [facility]. Son , Raffi , gave verbal consent over telephone for PT choice form and WARD.        09/03/24 1127   Post-Acute Status   Post-Acute Authorization Home Health   Home Health Status Referrals Sent

## 2024-09-03 NOTE — ASSESSMENT & PLAN NOTE
Patient with HTN and ESRD history   CT brain negative for acute findings  CBC and CMP consistent with end-stage renal disease   TSH mildly elevated  Ammonia 14  UA pending  Neurology consulted - recommends MRI of brain today  Fall precautions  Consult PT/OT   Supportive care

## 2024-09-03 NOTE — PHYSICIAN QUERY
Please clarify the nature/etiology of the patient's altered mental status/encephalopathy:  Metabolic Encephalopathy

## 2024-09-03 NOTE — PLAN OF CARE
Problem: Physical Therapy  Goal: Physical Therapy Goal  Description: Goals to be met by: 10/2/24     Patient will increase functional independence with mobility by performin. Supine to sit with MInimal Assistance  2. Sit to stand transfer with Minimal Assistance  3. Bed to chair transfer with Minimal Assistance using Rolling Walker  4. Gait  x 25 feet with Minimal Assistance using Rolling Walker.            Outcome: Progressing

## 2024-09-03 NOTE — HOSPITAL COURSE
85-year-old female with a past medical history of ESRD on HD Monday Wednesday Friday, hypertension, gastroparesis, atrial fibrillation, previous CVA, hyperlipidemia who presented to the ER for evaluation of generalized weakness and slurred speech.  Patient was evaluated in the emergency department, where she was initially found to be hypertensive with a systolic blood pressure of 200.  CT without acute findings.  Labs consistent with end-stage renal disease.  Patient was admitted for further observation and evaluation by nephrology and neurology.  She was monitored on telemetry and remained hemodynamically stable.  Patient continued routine dialysis which was not well tolerated at times due to hypotension.  Patient's mental status continues to slowly improve and patient's daughters reported to nursing staff that she is near her baseline.  Neurology recommended MRI of brain.  MRI brain with no acute findings, but diffuse white matter changes, likely reflection of chronic microvascular ischemia. Her mental status continued to wax and wane.  PT/OT recommended moderate intensity therapy.  Attempted to place patient in SNF; however, she was deemed inappropriate for placement.  Plan of care discussed with patient's son, Raffi, who opted to take the patient home with home hospice.  A referral was made to [facility] who will admit patient today once at home.  Discharge instructions reviewed and questions answered.  She was discharged to home hospice.

## 2024-09-03 NOTE — PROGRESS NOTES
INPATIENT NEPHROLOGY CONSULT   [facility]    Patient Name: Tyra Isaac  Date: 09/03/2024    Reason for consultation: ESRD    Chief Complaint:   Chief Complaint   Patient presents with    Weakness    Slurred speech     Son states she was talking fine last night and this morning she woke up with slurred speech and even weaker than she was yesterday. She was suppose to be at dialysis today but they told her she was too weak to come     History of Present Illness:  84-year-old female presented emergency department with gradually worsening generalized weakness. Patient was feeling weak yesterday and had slurred speech and has difficulty walking over the past 2-3 days per son. Patient on Eliquis and has history of arrhythmias. Patient has difficulty answering questions and patient does have generalized weakness and has some difficulty following commands. Patient however is understanding some questions and following some commands. Patient had previous history of being encephalopathic. Son describes this as more gradual onset of symptoms and patient was gradually getting worse. Patient has history of end-stage renal disease and is scheduled to get dialysis this morning. As patient was extremely weak and they called dialysis and was told to come here. Patient has difficulty walking on her own and needed assistance. Patient is able to move all extremities however has weakness in all extremities and has difficulty walking secondary to generalized weakness. Patient per son was gradually deteriorating. Patient was doing physical therapy and responding well last week however yesterday all day yesterday patient was feeling shaky and had slurring of speech and was having weakness. Consulted for dialysis.     Interval History:  9/2- admit /90, on RA, CT head, neg, CXR clear, BNP 1226 (3400 in Aug), ordered routine HD/UF, she was given clonidine and IV hydralazine and BP improved- seen in ER- reports  not being able to stand/walk- continues with same twitching noted during prior admissions along with same speech changes noted during prior admissions  9/3 VSS. HD today.    Plan of Care:    Hyponatremia  Hyperkalemia  ESRD on HD MWF  Hypertensive urgency  Secondary HPT  Anemia of CKD    Plan:    - ordered HD MWF  - monitor BP post BP meds given-- on midodrine for orthostatic hypotension- ordered 1-3L UF  - resume binder with meals  - no acute BRAYAN needs  - consider K binders  - limit free water intake    Thank you for allowing us to participate in this patient's care. We will continue to follow.    Vital Signs:  Temp Readings from Last 3 Encounters:   09/03/24 97.6 °F (36.4 °C) (Oral)   08/17/24 98.8 °F (37.1 °C) (Oral)   08/09/24 98.2 °F (36.8 °C) (Oral)       Pulse Readings from Last 3 Encounters:   09/03/24 (!) 56   08/20/24 63   08/17/24 64       BP Readings from Last 3 Encounters:   09/03/24 (!) 176/73   08/20/24 (!) 131/53   08/17/24 (!) 145/62       Weight:  Wt Readings from Last 3 Encounters:   09/02/24 44.5 kg (98 lb)   08/13/24 47.6 kg (104 lb 15 oz)   08/09/24 46.3 kg (102 lb)       Past Medical & Surgical History:  Past Medical History:   Diagnosis Date    A-fib     Anxiety     Depression     Disorder of kidney and ureter     Encephalopathy acute 1/1/2018    End stage kidney disease 6/17/2017    Gout     Hyperlipidemia     Hypertension     Moderate episode of recurrent major depressive disorder 1/17/2018    Nephropathy hypertensive, 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    Osteopenia of multiple sites 3/9/2018    Based upon bone density measurements. Patient also has chronic kidney disease.    Stroke 11/2016       Past Surgical History:   Procedure Laterality Date    ANGIOGRAM, CORONARY, WITH LEFT HEART CATHETERIZATION N/A 2/7/2022    Procedure: Angiogram, Coronary, with  Left Heart Cath;  Surgeon: Gino Leal MD;  Location: [facility] CATH/EP LAB;  Service: Cardiology;  Laterality: N/A;    CARDIAC SURGERY      stents    EYE SURGERY      WRIST SURGERY         Past Social History:  Social History     Socioeconomic History    Marital status:      Spouse name: Aria Isaac    Number of children: 3   Occupational History    Occupation: Not working   Tobacco Use    Smoking status: Never    Smokeless tobacco: Never   Substance and Sexual Activity    Alcohol use: No    Drug use: No    Sexual activity: Not Currently     Social Determinants of Health     Financial Resource Strain: Low Risk  (8/13/2024)    Overall Financial Resource Strain (CARDIA)     Difficulty of Paying Living Expenses: Not hard at all   Recent Concern: Financial Resource Strain - Medium Risk (7/11/2024)    Overall Financial Resource Strain (CARDIA)     Difficulty of Paying Living Expenses: Somewhat hard   Food Insecurity: No Food Insecurity (8/13/2024)    Hunger Vital Sign     Worried About Running Out of Food in the Last Year: Never true     Ran Out of Food in the Last Year: Never true   Transportation Needs: No Transportation Needs (8/13/2024)    TRANSPORTATION NEEDS     Transportation : No   Physical Activity: Inactive (8/13/2024)    Exercise Vital Sign     Days of Exercise per Week: 0 days     Minutes of Exercise per Session: 0 min   Stress: Stress Concern Present (8/20/2024)    [employer] - Occupational Stress Questionnaire     Feeling of Stress : Rather much   Housing Stability: Low Risk  (8/13/2024)    Housing Stability Vital Sign     Unable to Pay for Housing in the Last Year: No     Homeless in the Last Year: No       Medications:  Scheduled Meds:   amiodarone  100 mg Oral Daily    apixaban  2.5 mg Oral BID    atorvastatin  40 mg Oral QHS    calcium acetate(phosphat bind)  1,334 mg Oral TID WM    latanoprost  1 drop Both Eyes QHS    levothyroxine  75 mcg Oral Before  breakfast    midodrine  10 mg Oral TID WM     Continuous Infusions:      PRN Meds:.  Current Facility-Administered Medications:     acetaminophen, 650 mg, Oral, Q8H PRN    acetaminophen, 650 mg, Oral, Q4H PRN    aluminum-magnesium hydroxide-simethicone, 30 mL, Oral, QID PRN    dextrose 50%, 12.5 g, Intravenous, PRN    dextrose 50%, 25 g, Intravenous, PRN    glucagon (human recombinant), 1 mg, Intramuscular, PRN    glucose, 16 g, Oral, PRN    glucose, 24 g, Oral, PRN    hydrALAZINE, 10 mg, Intravenous, Q6H PRN    HYDROcodone-acetaminophen, 1 tablet, Oral, Q6H PRN    lorazepam, 2 mg, Intravenous, Once PRN    melatonin, 6 mg, Oral, Nightly PRN    naloxone, 0.02 mg, Intravenous, PRN    ondansetron, 4 mg, Intravenous, Q6H PRN    senna-docusate 8.6-50 mg, 1 tablet, Oral, BID PRN    sodium chloride 0.9%, 10 mL, Intravenous, Q12H PRN  No current facility-administered medications on file prior to encounter.     Current Outpatient Medications on File Prior to Encounter   Medication Sig Dispense Refill    amiodarone (PACERONE) 100 MG Tab Take 1 tablet (100 mg total) by mouth once daily. (Patient taking differently: Take 100 mg by mouth every evening.) 30 tablet 0    atorvastatin (LIPITOR) 40 MG tablet Take 40 mg by mouth every evening.      b complex vitamins tablet Take 1 tablet by mouth every evening.      calcium acetate,phosphat bind, (PHOSLO) 667 mg capsule Take 1,334 mg by mouth 3 (three) times daily with meals. TAKE 2 CAPSULES BY MOUTH WITH MEALS AND 1 CAPSULE WITH EACH SNACK      ELIQUIS 2.5 mg Tab Take 2.5 mg by mouth 2 (two) times daily.      latanoprost 0.005 % ophthalmic solution Place 1 drop into both eyes every evening.      midodrine (PROAMATINE) 10 MG tablet Take 1 tablet (10 mg total) by mouth 3 (three) times daily with meals. 270 tablet 0    ondansetron (ZOFRAN-ODT) 4 MG TbDL Take 1 tablet (4 mg total) by mouth every 6 (six) hours as needed (nausea). 30 tablet 5       Allergies:  Cyclobenzaprine, Fish  "containing products, Peanut, and Tramadol    Past Family History:  Reviewed; refer to Hospitalist Admission Note    Review of Systems:  Review of Systems - All 14 systems reviewed and negative, except as noted in HPI    Physical Exam:  General Appearance:    NAD, AAO x 3, cooperative, appears stated age   Head:    Normocephalic, atraumatic   Eyes:    PER, EOMI, and conjunctiva/sclera clear bilaterally       Mouth:   Moist mucus membranes, no thrush or oral lesions,       normal dentition   Back:     No CVA tenderness   Lungs:     Clear to auscultation bilaterally, no wheezes, crackles,           rales or rhonchi, symmetric air movement, respirations unlabored   Chest wall:    No tenderness or deformity   Heart:    Regular rate and rhythm, S1 and S2 normal, no murmur, rub   or gallop   Abdomen:     Soft, non-tender, non-distended, bowel sounds active all four   quadrants, no RT or guarding, no masses, no organomegaly   Extremities:   Warm and well perfused, distal pulses are intact, no             cyanosis or peripheral edema   MSK:   No joint or muscle swelling, tenderness or deformity   Skin:   Skin color, texture, turgor normal, no rashes or lesions   Neurologic/Psychiatric:   CNII-XII intact, normal strength and sensation       throughout, no asterixis; normal affect, memory, judgement     and insight      Results:  Lab Results   Component Value Date     (L) 09/02/2024    K 5.0 09/02/2024    CL 91 (L) 09/02/2024    CO2 29 09/02/2024    BUN 58 (H) 09/02/2024    CREATININE 11.5 (H) 09/02/2024    CALCIUM 10.0 09/02/2024    ANIONGAP 15 09/02/2024    ESTGFRAFRICA 4.7 (A) 02/08/2022    EGFRNONAA 4.0 (A) 02/08/2022       Lab Results   Component Value Date    CALCIUM 10.0 09/02/2024    PHOS 3.0 08/13/2024       No results for input(s): "WBC", "RBC", "HGB", "HCT", "PLT", "MCV", "MCH", "MCHC" in the last 24 hours.      I have spent > minutes providing care for this patient for the above diagnoses. These services have " included chart/data/imaging review, evaluation, exam, formulation of plan, , note preparation, and discussions with staff involved in this patient's care.    Raleigh Yee MD  [facility] [address] [address]  [phone] (p)  [phone] (f)

## 2024-09-03 NOTE — PT/OT/SLP PROGRESS
Occupational Therapy      Patient Name:  Tyra Isaac   MRN:  [id number]    Patient not seen today secondary to  (2 attempts: First, pt in PT session. Second, pt LADI in procedure.). Will follow-up 9/4/2024.    9/3/2024

## 2024-09-03 NOTE — PROGRESS NOTES
Pt is shaking in her bed. Unable to scan with pt shaking. Pt must stay still for 20 minutes for diagnostic scans

## 2024-09-04 LAB
ALBUMIN SERPL BCP-MCNC: 4.6 G/DL (ref 3.5–5.2)
ALP SERPL-CCNC: 70 U/L (ref 55–135)
ALT SERPL W/O P-5'-P-CCNC: 12 U/L (ref 10–44)
ANION GAP SERPL CALC-SCNC: 14 MMOL/L (ref 8–16)
AST SERPL-CCNC: 24 U/L (ref 10–40)
BASOPHILS # BLD AUTO: 0.02 K/UL (ref 0–0.2)
BASOPHILS NFR BLD: 0.4 % (ref 0–1.9)
BILIRUB SERPL-MCNC: 0.9 MG/DL (ref 0.1–1)
BUN SERPL-MCNC: 49 MG/DL (ref 8–23)
CALCIUM SERPL-MCNC: 10.4 MG/DL (ref 8.7–10.5)
CHLORIDE SERPL-SCNC: 99 MMOL/L (ref 95–110)
CO2 SERPL-SCNC: 21 MMOL/L (ref 23–29)
CREAT SERPL-MCNC: 10.1 MG/DL (ref 0.5–1.4)
DIFFERENTIAL METHOD BLD: ABNORMAL
EOSINOPHIL # BLD AUTO: 0.6 K/UL (ref 0–0.5)
EOSINOPHIL NFR BLD: 12 % (ref 0–8)
ERYTHROCYTE [DISTWIDTH] IN BLOOD BY AUTOMATED COUNT: 22.2 % (ref 11.5–14.5)
EST. GFR  (NO RACE VARIABLE): 3.5 ML/MIN/1.73 M^2
GLUCOSE SERPL-MCNC: 75 MG/DL (ref 70–110)
HCT VFR BLD AUTO: 47.9 % (ref 37–48.5)
HGB BLD-MCNC: 13.9 G/DL (ref 12–16)
IMM GRANULOCYTES # BLD AUTO: 0.01 K/UL (ref 0–0.04)
IMM GRANULOCYTES NFR BLD AUTO: 0.2 % (ref 0–0.5)
LYMPHOCYTES # BLD AUTO: 0.8 K/UL (ref 1–4.8)
LYMPHOCYTES NFR BLD: 14.7 % (ref 18–48)
MAGNESIUM SERPL-MCNC: 2.3 MG/DL (ref 1.6–2.6)
MCH RBC QN AUTO: 25.6 PG (ref 27–31)
MCHC RBC AUTO-ENTMCNC: 29 G/DL (ref 32–36)
MCV RBC AUTO: 88 FL (ref 82–98)
MONOCYTES # BLD AUTO: 0.7 K/UL (ref 0.3–1)
MONOCYTES NFR BLD: 14.5 % (ref 4–15)
NEUTROPHILS # BLD AUTO: 3 K/UL (ref 1.8–7.7)
NEUTROPHILS NFR BLD: 58.2 % (ref 38–73)
NRBC BLD-RTO: 0 /100 WBC
PLATELET # BLD AUTO: 191 K/UL (ref 150–450)
PMV BLD AUTO: 9.7 FL (ref 9.2–12.9)
POTASSIUM SERPL-SCNC: 6.2 MMOL/L (ref 3.5–5.1)
PROT SERPL-MCNC: 8.3 G/DL (ref 6–8.4)
RBC # BLD AUTO: 5.42 M/UL (ref 4–5.4)
SODIUM SERPL-SCNC: 134 MMOL/L (ref 136–145)
WBC # BLD AUTO: 5.09 K/UL (ref 3.9–12.7)

## 2024-09-04 PROCEDURE — 90935 HEMODIALYSIS ONE EVALUATION: CPT

## 2024-09-04 PROCEDURE — 80053 COMPREHEN METABOLIC PANEL: CPT | Performed by: NURSE PRACTITIONER

## 2024-09-04 PROCEDURE — 36415 COLL VENOUS BLD VENIPUNCTURE: CPT | Performed by: NURSE PRACTITIONER

## 2024-09-04 PROCEDURE — 25000003 PHARM REV CODE 250: Performed by: INTERNAL MEDICINE

## 2024-09-04 PROCEDURE — 85025 COMPLETE CBC W/AUTO DIFF WBC: CPT | Performed by: NURSE PRACTITIONER

## 2024-09-04 PROCEDURE — 83735 ASSAY OF MAGNESIUM: CPT | Performed by: NURSE PRACTITIONER

## 2024-09-04 PROCEDURE — 25000003 PHARM REV CODE 250: Performed by: NURSE PRACTITIONER

## 2024-09-04 PROCEDURE — 12000002 HC ACUTE/MED SURGE SEMI-PRIVATE ROOM

## 2024-09-04 PROCEDURE — 63600175 PHARM REV CODE 636 W HCPCS: Performed by: NURSE PRACTITIONER

## 2024-09-04 PROCEDURE — 97530 THERAPEUTIC ACTIVITIES: CPT | Mod: CQ

## 2024-09-04 PROCEDURE — 96376 TX/PRO/DX INJ SAME DRUG ADON: CPT

## 2024-09-04 RX ADMIN — APIXABAN 2.5 MG: 2.5 TABLET, FILM COATED ORAL at 08:09

## 2024-09-04 RX ADMIN — ATORVASTATIN CALCIUM 40 MG: 40 TABLET, FILM COATED ORAL at 08:09

## 2024-09-04 RX ADMIN — LATANOPROST 1 DROP: 50 SOLUTION/ DROPS OPHTHALMIC at 08:09

## 2024-09-04 RX ADMIN — SODIUM ZIRCONIUM CYCLOSILICATE 10 G: 10 POWDER, FOR SUSPENSION ORAL at 04:09

## 2024-09-04 RX ADMIN — CALCIUM ACETATE 1334 MG: 667 CAPSULE ORAL at 07:09

## 2024-09-04 RX ADMIN — CALCIUM ACETATE 1334 MG: 667 CAPSULE ORAL at 04:09

## 2024-09-04 RX ADMIN — LEVOTHYROXINE SODIUM 75 MCG: 0.03 TABLET ORAL at 07:09

## 2024-09-04 RX ADMIN — HYDRALAZINE HYDROCHLORIDE 10 MG: 20 INJECTION INTRAMUSCULAR; INTRAVENOUS at 03:09

## 2024-09-04 RX ADMIN — AMIODARONE HYDROCHLORIDE 100 MG: 100 TABLET ORAL at 08:09

## 2024-09-04 NOTE — PT/OT/SLP PROGRESS
Physical Therapy Treatment    Patient Name:  Tyra Isaac   MRN:  [id number]    Recommendations:     Discharge Recommendations: Moderate Intensity Therapy  Discharge Equipment Recommendations: none  Barriers to discharge:  decreased safety awareness; unable to progress mobility     Assessment:     Tyra Isaac is a 84 y.o. female admitted with a medical diagnosis of Encephalopathy, metabolic.  She presents with the following impairments/functional limitations: weakness, impaired endurance, impaired functional mobility, gait instability, impaired balance, decreased safety awareness, pain, impaired cardiopulmonary response to activity, impaired coordination, decreased upper extremity function, impaired cognition .    First attempt: off unit for dialysis.  Second attempt: pt found pulling off telemetry leads and grasping at gown, panicked-appearing, and attempting to sit up. Unable to verbally express needs. Therapist was able to redirect pt's hands from  pulling at equipment. Unsuccessful attempt to transfer to sit EOB despite maxA, due to pt panicking, thrashing, and grasping at therapist.    Pt was assisted back to supine and scooted up in bed with assist x2. Was calm in affect at therapist's departure. Pt unable to meaningfully participate in therapy this date.     Rehab Prognosis: Good and Fair; patient would benefit from acute skilled PT services to address these deficits and reach maximum level of function.    Recent Surgery: * No surgery found *      Plan:     During this hospitalization, patient to be seen 5 x/week to address the identified rehab impairments via therapeutic activities, therapeutic exercises, gait training, neuromuscular re-education and progress toward the following goals:    Plan of Care Expires:  10/02/24    Subjective     Chief Complaint: unable to express needs  Patient/Family Comments/goals: unable to express  Pain/Comfort:  Pain Rating 1: other (see comments) (unable to  verbalize or express needs)      Objective:     Communicated with nurse prior to session.  Patient found HOB elevated with peripheral IV, telemetry, PureWick, bed alarm upon PT entry to room.     General Precautions: Standard, fall  Orthopedic Precautions: N/A  Braces: N/A  Respiratory Status: Room air     Functional Mobility:  Bed Mobility:     Supine to Sit: unsuccessful attempt to transfer to sit EOB despite maxA due to pt panicking, thrashing, and grasping at therapist       AM-PAC 6 CLICK MOBILITY          Treatment & Education:  -Pt educ: benefits of participation with therapy, fall prevention, call light     Patient left HOB elevated with all lines intact, call button in reach, bed alarm on, and nurse notified..    GOALS:   Multidisciplinary Problems       Physical Therapy Goals          Problem: Physical Therapy    Goal Priority Disciplines Outcome Goal Variances Interventions   Physical Therapy Goal     PT, PT/OT Progressing     Description: Goals to be met by: 10/2/24     Patient will increase functional independence with mobility by performin. Supine to sit with MInimal Assistance  2. Sit to stand transfer with Minimal Assistance  3. Bed to chair transfer with Minimal Assistance using Rolling Walker  4. Gait  x 25 feet with Minimal Assistance using Rolling Walker.                                 Time Tracking:     PT Received On: 24  PT Start Time: 1417     PT Stop Time: 1425  PT Total Time (min): 8 min     Billable Minutes: Therapeutic Activity 8    Treatment Type: Treatment  PT/PTA: PTA     Number of PTA visits since last PT visit: 2024

## 2024-09-04 NOTE — PROGRESS NOTES
09/04/24 1230   Handoff Report   Received From Hector CONROY RN   Given To Duglas KNAPP RN   Treatment Type   Treatment Type Maintenance   Vital Signs   Temp 98.1 °F (36.7 °C)   Temp Source Axillary   Pulse 79   Heart Rate Source Monitor   Resp 16   SpO2 97 %   Device (Oxygen Therapy) room air   BP (!) 123/53   BP Method Automatic   Patient Position Lying   Assessments (Pre/Post)   Blood Liters Processed (BLP) 59.5   Transport Modality bed   Level of Consciousness (AVPU) responds to voice   Dialyzer Clearance moderately streaked   Pain   Pain Rating (0-10): Rest 0   Post-Hemodialysis Assessment   Rinseback Volume (mL) 250 mL   Blood Volume Processed (Liters) 59.5 L   Dialyzer Clearance Moderately streaked   Duration of Treatment 180 minutes   Additional Fluid Intake (mL) 600 mL   Total UF (mL) 900 mL   Net Fluid Removal 300   Patient Response to Treatment pt daniela tx   Post-Treatment Weight 43.4 kg (95 lb 10.9 oz)   Treatment Weight Change -0.3   Arterial bleeding stop time (min) 6 min   Venous bleeding stop time (min) 6 min   Post-Hemodialysis Comments pt stable     Unable to remove much fluid to low b/p during the tx, 300 ml net uf removed

## 2024-09-04 NOTE — ASSESSMENT & PLAN NOTE
Creatine stable for now. BMP reviewed- noted Estimated Creatinine Clearance: 2.9 mL/min (A) (based on SCr of 10.1 mg/dL (H)). according to latest data. Based on current GFR, CKD stage is end stage.  Monitor UOP and serial BMP and adjust therapy as needed. Renally dose meds. Avoid nephrotoxic medications and procedures. Consult to nephrology for HD MWF

## 2024-09-04 NOTE — ASSESSMENT & PLAN NOTE
Patient with HTN and ESRD history   CT brain negative for acute findings  CBC and CMP consistent with end-stage renal disease   TSH mildly elevated  Ammonia 14  UA pending-pt is anuric  Neurology consulted - recommends MRI of brain today-Diffuse white matter changes, likely reflection of chronic microvascular ischemia with no acute findings.  Fall precautions  Consult PT/OT   Supportive care

## 2024-09-04 NOTE — PROGRESS NOTES
INPATIENT NEPHROLOGY CONSULT   [facility]    Patient Name: Tyra Isaac  Date: 09/04/2024    Reason for consultation:   ESRD    Chief Complaint:   Chief Complaint   Patient presents with    Weakness    Slurred speech     Son states she was talking fine last night and this morning she woke up with slurred speech and even weaker than she was yesterday. She was suppose to be at dialysis today but they told her she was too weak to come     History of Present Illness:  84-year-old female presented emergency department with gradually worsening generalized weakness. Patient was feeling weak yesterday and had slurred speech and has difficulty walking over the past 2-3 days per son. Patient on Eliquis and has history of arrhythmias. Patient has difficulty answering questions and patient does have generalized weakness and has some difficulty following commands. Patient however is understanding some questions and following some commands. Patient had previous history of being encephalopathic. Son describes this as more gradual onset of symptoms and patient was gradually getting worse. Patient has history of end-stage renal disease and is scheduled to get dialysis this morning. As patient was extremely weak and they called dialysis and was told to come here. Patient has difficulty walking on her own and needed assistance. Patient is able to move all extremities however has weakness in all extremities and has difficulty walking secondary to generalized weakness. Patient per son was gradually deteriorating. Patient was doing physical therapy and responding well last week however yesterday all day yesterday patient was feeling shaky and had slurring of speech and was having weakness. Consulted for dialysis.     Interval History:  9/2- admit /90, on RA, CT head, neg, CXR clear, BNP 1226 (3400 in Aug), ordered routine HD/UF, she was given clonidine and IV hydralazine and BP improved- seen in ER-  reports not being able to stand/walk- continues with same twitching noted during prior admissions along with same speech changes noted during prior admissions  9/3 VSS. HD today.  9/4 VSS. Hyperkalemia noted, suspect spurious. HD today. Give Lokelma.    Plan of Care:    Hyponatremia  Hyperkalemia  ESRD on HD MWF  Hypertensive urgency  Secondary HPT  Anemia of CKD    Plan:    - start Lokelma, adjust dialysate as needed  - continue HD MWF or PRN  - on midodrine PRN for orthostatic hypotension- 1-3L UF as tolerated  - resumed Ca containing binder with meals  - no acute BRAYAN needs, goal Hgb 8-10  - limit free water intake as tolerated    Thank you for allowing us to participate in this patient's care. We will continue to follow.    Vital Signs:  Temp Readings from Last 3 Encounters:   09/04/24 97.7 °F (36.5 °C) (Oral)   08/17/24 98.8 °F (37.1 °C) (Oral)   08/09/24 98.2 °F (36.8 °C) (Oral)       Pulse Readings from Last 3 Encounters:   09/04/24 73   08/20/24 63   08/17/24 64       BP Readings from Last 3 Encounters:   09/04/24 (!) 152/53   08/20/24 (!) 131/53   08/17/24 (!) 145/62       Weight:  Wt Readings from Last 3 Encounters:   09/02/24 44.5 kg (98 lb)   08/13/24 47.6 kg (104 lb 15 oz)   08/09/24 46.3 kg (102 lb)       Past Medical & Surgical History:  Past Medical History:   Diagnosis Date    A-fib     Anxiety     Depression     Disorder of kidney and ureter     Encephalopathy acute 1/1/2018    End stage kidney disease 6/17/2017    Gout     Hyperlipidemia     Hypertension     Moderate episode of recurrent major depressive disorder 1/17/2018    Nephropathy hypertensive, 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    Osteopenia of multiple sites 3/9/2018    Based upon bone density measurements. Patient also has chronic kidney disease.    Stroke 11/2016       Past Surgical History:   Procedure  Laterality Date    ANGIOGRAM, CORONARY, WITH LEFT HEART CATHETERIZATION N/A 2/7/2022    Procedure: Angiogram, Coronary, with Left Heart Cath;  Surgeon: Gino Leal MD;  Location: [facility] CATH/EP LAB;  Service: Cardiology;  Laterality: N/A;    CARDIAC SURGERY      stents    EYE SURGERY      WRIST SURGERY         Past Social History:  Social History     Socioeconomic History    Marital status:      Spouse name: Aria Isaac    Number of children: 3   Occupational History    Occupation: Not working   Tobacco Use    Smoking status: Never    Smokeless tobacco: Never   Substance and Sexual Activity    Alcohol use: No    Drug use: No    Sexual activity: Not Currently     Social Determinants of Health     Financial Resource Strain: Patient Unable To Answer (9/3/2024)    Overall Financial Resource Strain (CARDIA)     Difficulty of Paying Living Expenses: Patient unable to answer   Recent Concern: Financial Resource Strain - Medium Risk (7/11/2024)    Overall Financial Resource Strain (CARDIA)     Difficulty of Paying Living Expenses: Somewhat hard   Food Insecurity: Patient Unable To Answer (9/3/2024)    Hunger Vital Sign     Worried About Running Out of Food in the Last Year: Patient unable to answer     Ran Out of Food in the Last Year: Patient unable to answer   Transportation Needs: Patient Unable To Answer (9/3/2024)    TRANSPORTATION NEEDS     Transportation : Patient unable to answer   Physical Activity: Inactive (9/3/2024)    Exercise Vital Sign     Days of Exercise per Week: 0 days     Minutes of Exercise per Session: 0 min   Stress: Patient Unable To Answer (9/3/2024)    [employer] - Occupational Stress Questionnaire     Feeling of Stress : Patient unable to answer   Recent Concern: Stress - Stress Concern Present (8/20/2024)    [employer] - Occupational Stress Questionnaire     Feeling of Stress : Rather much   Housing Stability: Patient  Unable To Answer (9/3/2024)    Housing Stability Vital Sign     Unable to Pay for Housing in the Last Year: Patient unable to answer     Homeless in the Last Year: Patient unable to answer       Medications:  Scheduled Meds:   amiodarone  100 mg Oral Daily    apixaban  2.5 mg Oral BID    atorvastatin  40 mg Oral QHS    calcium acetate(phosphat bind)  1,334 mg Oral TID WM    latanoprost  1 drop Both Eyes QHS    levothyroxine  75 mcg Oral Before breakfast     Continuous Infusions:      PRN Meds:.  Current Facility-Administered Medications:     acetaminophen, 650 mg, Oral, Q8H PRN    acetaminophen, 650 mg, Oral, Q4H PRN    aluminum-magnesium hydroxide-simethicone, 30 mL, Oral, QID PRN    dextrose 50%, 12.5 g, Intravenous, PRN    dextrose 50%, 25 g, Intravenous, PRN    glucagon (human recombinant), 1 mg, Intramuscular, PRN    glucose, 16 g, Oral, PRN    glucose, 24 g, Oral, PRN    hydrALAZINE, 10 mg, Intravenous, Q6H PRN    HYDROcodone-acetaminophen, 1 tablet, Oral, Q6H PRN    melatonin, 6 mg, Oral, Nightly PRN    naloxone, 0.02 mg, Intravenous, PRN    ondansetron, 4 mg, Intravenous, Q6H PRN    senna-docusate 8.6-50 mg, 1 tablet, Oral, BID PRN    sodium chloride 0.9%, 10 mL, Intravenous, Q12H PRN  No current facility-administered medications on file prior to encounter.     Current Outpatient Medications on File Prior to Encounter   Medication Sig Dispense Refill    amiodarone (PACERONE) 100 MG Tab Take 1 tablet (100 mg total) by mouth once daily. (Patient taking differently: Take 100 mg by mouth every evening.) 30 tablet 0    atorvastatin (LIPITOR) 40 MG tablet Take 40 mg by mouth every evening.      b complex vitamins tablet Take 1 tablet by mouth every evening.      calcium acetate,phosphat bind, (PHOSLO) 667 mg capsule Take 1,334 mg by mouth 3 (three) times daily with meals. TAKE 2 CAPSULES BY MOUTH WITH MEALS AND 1 CAPSULE WITH EACH SNACK      ELIQUIS 2.5 mg Tab Take 2.5 mg by mouth 2 (two) times daily.       latanoprost 0.005 % ophthalmic solution Place 1 drop into both eyes every evening.      midodrine (PROAMATINE) 10 MG tablet Take 1 tablet (10 mg total) by mouth 3 (three) times daily with meals. 270 tablet 0    ondansetron (ZOFRAN-ODT) 4 MG TbDL Take 1 tablet (4 mg total) by mouth every 6 (six) hours as needed (nausea). 30 tablet 5       Allergies:  Cyclobenzaprine, Fish containing products, Peanut, and Tramadol    Past Family History:  Reviewed; refer to Hospitalist Admission Note    Review of Systems:  Review of Systems - All 14 systems reviewed and negative, except as noted in HPI    Physical Exam:  General Appearance:    NAD, AAO x 3, cooperative, appears stated age   Head:    Normocephalic, atraumatic   Eyes:    PER, EOMI, and conjunctiva/sclera clear bilaterally       Mouth:   Moist mucus membranes, no thrush or oral lesions,       normal dentition   Back:     No CVA tenderness   Lungs:     Clear to auscultation bilaterally, no wheezes, crackles,           rales or rhonchi, symmetric air movement, respirations unlabored   Chest wall:    No tenderness or deformity   Heart:    Regular rate and rhythm, S1 and S2 normal, no murmur, rub   or gallop   Abdomen:     Soft, non-tender, non-distended, bowel sounds active all four   quadrants, no RT or guarding, no masses, no organomegaly   Extremities:   Warm and well perfused, distal pulses are intact, no             cyanosis or peripheral edema   MSK:   No joint or muscle swelling, tenderness or deformity   Skin:   Skin color, texture, turgor normal, no rashes or lesions   Neurologic/Psychiatric:   CNII-XII intact, normal strength and sensation       throughout, no asterixis; normal affect, memory, judgement     and insight      Results:  Recent Labs   Lab 09/02/24  0745 09/03/24  1027 09/04/24  0735   * 134* 134*   K 5.0 4.7 6.2*   CL 91* 100 99   CO2 29 22* 21*   BUN 58* 35* 49*   CREATININE 11.5* 8.3* 10.1*   GLU 98 74 75       Recent Labs   Lab 09/02/24  0745  "09/03/24  1027 09/04/24  0735   CALCIUM 10.0 10.6* 10.4   ALBUMIN 4.3 4.6 4.6   MG  --  2.3 2.3             No results for input(s): "POCTGLUCOSE" in the last 168 hours.    Recent Labs   Lab 01/26/22  1525 06/28/24  0553   Hemoglobin A1C 5.3 5.1       Recent Labs   Lab 09/02/24  0745 09/03/24  1027 09/04/24  0735   WBC 5.06 5.46 5.09   HGB 12.4 14.7 13.9   HCT 42.0 51.3* 47.9    167 191   MCV 89 90 88   MCHC 29.5* 28.7* 29.0*   MONO 12.6  0.6 9.5  0.5 14.5  0.7   EOSINOPHIL 14.4* 16.3* 12.0*       Recent Labs   Lab 09/02/24  0745 09/03/24  1027 09/04/24  0735   BILITOT 0.7 1.0 0.9   PROT 7.6 8.4 8.3   ALBUMIN 4.3 4.6 4.6   ALKPHOS 65 70 70   ALT 11 9* 12   AST 26 25 24       Recent Labs   Lab 12/04/23  0959 12/20/23  1247 06/03/24  1541 08/12/24  1233   Color, UA Yellow Yellow Yellow Yellow   Appearance, UA Clear Clear Clear Clear   pH, UA 8.0 6.0 8.0 8.0   Specific [place], UA 1.015 1.015 1.010 1.010   Protein, UA 2+ A 2+ A Negative 1+ A   Glucose, UA 1+ A Negative Negative Negative   Ketones, UA Trace A Negative Negative Negative   Urobilinogen, UA Negative Negative Negative Negative   Bilirubin (UA) Negative Negative Negative Negative   Occult Blood UA 2+ A Negative Negative 1+ A   Nitrite, UA Negative Negative Negative Negative   RBC, UA 41 H 0  --  0   WBC, UA 2 3  --  4   Bacteria Negative Rare  --  None   Hyaline Casts, UA 6 A 8 A  --  0       Recent Labs   Lab 11/10/23  1529 12/20/23  1207 07/15/24  0621 09/02/24  0704   POC PH 7.391 7.390 7.345 L  --    POC PCO2 37.2 37.3 58.3 H  --    POC HCO3 22.6 L 22.6 L 31.8 H  --    POC PO2 53 LL 99 25 LL  --    POC SATURATED O2 87 98 39  --    POC BE -2 -2 6 H  --    Sample ARTERIAL ARTERIAL VENOUS VENOUS       Microbiology Results (last 7 days)       Procedure Component Value Units Date/Time    Blood culture x two cultures. Draw prior to antibiotics. [[id number]] Collected: 09/02/24 0816    Order Status: Completed Specimen: Blood from Peripheral, Hand, " Left Updated: 09/03/24 1032     Blood Culture, Routine No Growth to date      No Growth to date    Narrative:      Aerobic and anaerobic  Collection has been rescheduled by THB1 at 09/02/2024 08:01 Reason:   Unable to collect  Collection has been rescheduled by THB1 at 09/02/2024 08:01 Reason:   Unable to collect    Blood culture x two cultures. Draw prior to antibiotics. [[id number]] Collected: 09/02/24 0817    Order Status: Completed Specimen: Blood from Peripheral, Antecubital, Left Updated: 09/03/24 1032     Blood Culture, Routine No Growth to date      No Growth to date    Narrative:      Aerobic and anaerobic  Collection has been rescheduled by B1 at 09/02/2024 08:01 Reason:   Unable to collect  Collection has been rescheduled by B1 at 09/02/2024 08:01 Reason:   Unable to collect          I have spent > minutes providing care for this patient for the above diagnoses. These services have included chart/data/imaging review, evaluation, exam, formulation of plan, , note preparation, and discussions with staff involved in this patient's care.    Raleigh Yee MD  [facility]  [address] [place] [id number]  [phone] (p)  [phone] (f)

## 2024-09-04 NOTE — PLAN OF CARE
Problem: Skin Injury Risk Increased  Goal: Skin Health and Integrity  Outcome: Progressing     Problem: Fall Injury Risk  Goal: Absence of Fall and Fall-Related Injury  Outcome: Progressing     Problem: Adult Inpatient Plan of Care  Goal: Readiness for Transition of Care  Outcome: Progressing     Problem: Adult Inpatient Plan of Care  Goal: Absence of Hospital-Acquired Illness or Injury  Outcome: Progressing

## 2024-09-04 NOTE — ASSESSMENT & PLAN NOTE
Hyperkalemia is likely due to ESRD.The patients most recent potassium results are listed below.  Recent Labs     09/02/24  0745 09/03/24  1027 09/04/24  0735   K 5.0 4.7 6.2*     Plan  - Monitor for arrhythmias with EKG and/or continuous telemetry.   - Treat the hyperkalemia with Potassium Binders and HD .   - Monitor potassium: Daily

## 2024-09-04 NOTE — PROGRESS NOTES
[facility]  Progress Note    Patient Name: Tyra Isaac  MRN: [id number]  Patient Class: OP- Observation   Admission Date: 9/2/2024  Length of Stay: 0 days  Attending Physician: Anai Angela MD  Primary Care Provider: Kalpesh Goel MD        Subjective:     Principal Problem:Encephalopathy, metabolic        HPI:  The patient is an 85-year-old female with a past medical history of end-stage renal disease, hypertension, gastroparesis, atrial fibrillation, previous CVA, hyperlipidemia, who presented to emergency department for evaluation of gradually worsening generalized weakness.  Patient was recently admitted to the hospital in August and was discharged on August 23, 2024 after an admission for confusion weakness and tremors.  Patient was evaluated at that time and she had recently started on Reglan may which was attributed to causing her facial movements and tremors.  Patient continued to improve with rehab efforts and was discharged home.  Patient's son reports that the patient has been doing very well at home recently.  She was participating in PT and OT and eating well up until the past few days.  Patient's son reports that on Friday (today is Monday) that the patient became progressively more weak with a decreased appetite and garbled speech.  Patient was to have dialysis as she is HD Monday Wednesday Friday however she was too sick to go to dialysis today.  Dialysis clinic recommended patient son to bring patient to the ER for evaluation.  Patient is son denies any fever chills nausea vomiting diarrhea shortness of breath cough or any other complaints.    Patient was evaluated in the emergency department.  Patient was initially hypertensive upon arrival with systolic blood pressure in the 200s.  Patient was given clonidine and hydralazine in the emergency department which improved her blood pressure to systolic 129 on assessment.  Patient's labs consistent with  end-stage renal disease.  CT of brain without acute findings, chest x-ray without acute findings.  COVID and influenza screening negative.  TSH mildly elevated at 5.9 which is increased from 3 weeks ago when it was 3.748.  Ammonia negative, troponin mildly elevated at 31.3, this has also improved from 3 weeks ago when it was 177.  Patient will be admitted to the hospital for further evaluation with consultation to neurology and nephrology.    Overview/Hospital Course:  85-year-old female with a past medical history of ESRD on HD Monday Wednesday Friday, hypertension, gastroparesis, atrial fibrillation, previous CVA, hyperlipidemia who presented to the ER for evaluation of generalized weakness and slurred speech.  Patient was evaluated in the emergency department, where she was initially found to be hypertensive with a systolic blood pressure of 200.  CT without acute findings.  Labs consistent with end-stage renal disease.  Patient was admitted for further observation and evaluation by nephrology and neurology.  She was monitored on telemetry monitoring and remained hemodynamically stable.  Patient tolerated hemodialysis without issue.  Patient's mental status continues to slowly improve and patient's daughters reported to nursing staff that she is near her baseline.  Neurology recommended MRI of brain which will be obtained.    Interval History: pt seen today in dialysis.  Mumbling speech, word salad.  Course tremors noted.    Review of Systems   Unable to perform ROS: Other     Objective:     Vital Signs (Most Recent):  Temp: 98.1 °F (36.7 °C) (09/04/24 1230)  Pulse: 79 (09/04/24 1230)  Resp: 16 (09/04/24 1230)  BP: (!) 123/53 (09/04/24 1230)  SpO2: 97 % (09/04/24 1230) Vital Signs (24h Range):  Temp:  [97.5 °F (36.4 °C)-98.3 °F (36.8 °C)] 98.1 °F (36.7 °C)  Pulse:  [60-97] 79  Resp:  [16-19] 16  SpO2:  [96 %-99 %] 97 %  BP: ()/() 123/53     Weight: 44.5 kg (98 lb)  Body mass index is 16.31  kg/m².    Intake/Output Summary (Last 24 hours) at 9/4/2024 1358  Last data filed at 9/4/2024 1230  Gross per 24 hour   Intake 740 ml   Output 900 ml   Net -160 ml         Physical Exam  Constitutional:       General: She is not in acute distress.     Appearance: She is not toxic-appearing.   HENT:      Head: Normocephalic.      Mouth/Throat:      Mouth: Mucous membranes are moist.      Pharynx: Oropharynx is clear.   Eyes:      Extraocular Movements: Extraocular movements intact.      Conjunctiva/sclera: Conjunctivae normal.   Cardiovascular:      Rate and Rhythm: Normal rate and regular rhythm.      Pulses: Normal pulses.      Heart sounds: Normal heart sounds.   Pulmonary:      Effort: Pulmonary effort is normal.      Breath sounds: Normal breath sounds.   Abdominal:      General: Bowel sounds are normal. There is no distension.      Palpations: Abdomen is soft.      Tenderness: There is no abdominal tenderness.   Skin:     General: Skin is warm.      Capillary Refill: Capillary refill takes less than 2 seconds.   Neurological:      Mental Status: She is alert. Mental status is at baseline.      Comments: Unable to communicate effectively   Psychiatric:         Mood and Affect: Mood normal.             Significant Labs: All pertinent labs within the past 24 hours have been reviewed.  CBC:   Recent Labs   Lab 09/03/24  1027 09/04/24  0735   WBC 5.46 5.09   HGB 14.7 13.9   HCT 51.3* 47.9    191     CMP:   Recent Labs   Lab 09/03/24  1027 09/04/24  0735   * 134*   K 4.7 6.2*    99   CO2 22* 21*   GLU 74 75   BUN 35* 49*   CREATININE 8.3* 10.1*   CALCIUM 10.6* 10.4   PROT 8.4 8.3   ALBUMIN 4.6 4.6   BILITOT 1.0 0.9   ALKPHOS 70 70   AST 25 24   ALT 9* 12   ANIONGAP 12 14       Significant Imaging: I have reviewed all pertinent imaging results/findings within the past 24 hours.    Assessment/Plan:      * Encephalopathy, metabolic  Patient with HTN and ESRD history   CT brain negative for acute  findings  CBC and CMP consistent with end-stage renal disease   TSH mildly elevated  Ammonia 14  UA pending-pt is anuric  Neurology consulted - recommends MRI of brain today-Diffuse white matter changes, likely reflection of chronic microvascular ischemia with no acute findings.  Fall precautions  Consult PT/OT   Supportive care       Hypertensive cardiovascular-renal disease, stage 5 chronic kidney disease or end stage renal disease, with heart failure  Creatine stable for now. BMP reviewed- noted Estimated Creatinine Clearance: 2.9 mL/min (A) (based on SCr of 10.1 mg/dL (H)). according to latest data. Based on current GFR, CKD stage is end stage.  Monitor UOP and serial BMP and adjust therapy as needed. Renally dose meds. Avoid nephrotoxic medications and procedures. Consult to nephrology for HD MWF     Hyperkalemia  Hyperkalemia is likely due to ESRD.The patients most recent potassium results are listed below.  Recent Labs     09/02/24  0745 09/03/24  1027 09/04/24  0735   K 5.0 4.7 6.2*     Plan  - Monitor for arrhythmias with EKG and/or continuous telemetry.   - Treat the hyperkalemia with Potassium Binders and HD .   - Monitor potassium: Daily          Moderate malnutrition  Nutrition consulted. Most recent weight and BMI monitored-     Measurements:  Wt Readings from Last 1 Encounters:   09/02/24 44.5 kg (98 lb)   Body mass index is 16.31 kg/m².    Patient has been screened and assessed by RD.    Malnutrition Type:  Context: acute illness or injury  Level: moderate    Malnutrition Characteristic Summary:  Weight Loss (Malnutrition): greater than 7.5% in 3 months  Energy Intake (Malnutrition): less than 75% for greater than 7 days  Subcutaneous Fat (Malnutrition): mild depletion  Muscle Mass (Malnutrition): mild depletion  Fluid Accumulation (Malnutrition): mild    Interventions/Recommendations (treatment strategy):  1. Continue Renal diet as tolerated. 2. Recommend Nepro with meals. 3.  to obtain daily  menu choices.      Elevated TSH    TSH 5, was 3 on prior admission a few weeks ago  Start Synthroid 75 mcg daily    Atrial fibrillation  Patient with Persistent (7 days or more) atrial fibrillation which is controlled currently with Amiodarone. Patient is currently in sinus rhythm.[id number] Score: 4. HASBLED Score: 3. Anticoagulation indicated. Anticoagulation done with Eliquis .      VTE Risk Mitigation (From admission, onward)           Ordered     apixaban tablet 2.5 mg  2 times daily         09/02/24 1146     IP VTE HIGH RISK PATIENT  Once         09/02/24 1005     Place sequential compression device  Until discontinued         09/02/24 1005                    Discharge Planning   ILAN: 9/5/2024     Code Status: Full Code   Is the patient medically ready for discharge?:     Reason for patient still in hospital (select all that apply): Patient trending condition, Laboratory test, Treatment, and Consult recommendations  Discharge Plan A: Home Health                  KELBY Le  Department of Hospital Medicine   [facility]

## 2024-09-04 NOTE — PLAN OF CARE
VICENTE contacted Pt son in regards to PT/OT recommendation of  moderate therapy; Per Pt so Raffi Isaac (Son) [phone] he would need to speak to hos other siblings and Pt before making a decision. VICENTE explained to  Pt son that SNF is about 20days and facility can range depending on availability. Pt son to come to hospital and give SW a call back within 2 hours. Pt back up plan will be home and continue with HH. VICENTE to contiue to follow.      3:29pm- VICENTE called Pt son Raffi again to follow up if that would like SNF placement.  Mario Alberto Guidry has not reach his other siblings yet and will come to speak with Pt within the next 30min and should have a answer by then or tomorrow morning.    09/04/24 1118   Post-Acute Status   Post-Acute Authorization Placement

## 2024-09-04 NOTE — PT/OT/SLP PROGRESS
Occupational Therapy      Patient Name:  Tyra Isaac   MRN:  [id number]    Patient not seen today secondary to Dialysis. Will follow-up tomorrow.    9/4/2024

## 2024-09-04 NOTE — NURSING
Patient off unit to dialysis via bed in stable condition without any complications. VSS. NAD noted. AAOX1-2 (baseline). Transfer of care to AMY [facility].

## 2024-09-04 NOTE — SUBJECTIVE & OBJECTIVE
Interval History: pt seen today in dialysis.  Mumbling speech, word salad.  Course tremors noted.    Review of Systems   Unable to perform ROS: Other     Objective:     Vital Signs (Most Recent):  Temp: 98.1 °F (36.7 °C) (09/04/24 1230)  Pulse: 79 (09/04/24 1230)  Resp: 16 (09/04/24 1230)  BP: (!) 123/53 (09/04/24 1230)  SpO2: 97 % (09/04/24 1230) Vital Signs (24h Range):  Temp:  [97.5 °F (36.4 °C)-98.3 °F (36.8 °C)] 98.1 °F (36.7 °C)  Pulse:  [60-97] 79  Resp:  [16-19] 16  SpO2:  [96 %-99 %] 97 %  BP: ()/() 123/53     Weight: 44.5 kg (98 lb)  Body mass index is 16.31 kg/m².    Intake/Output Summary (Last 24 hours) at 9/4/2024 1358  Last data filed at 9/4/2024 1230  Gross per 24 hour   Intake 740 ml   Output 900 ml   Net -160 ml         Physical Exam  Constitutional:       General: She is not in acute distress.     Appearance: She is not toxic-appearing.   HENT:      Head: Normocephalic.      Mouth/Throat:      Mouth: Mucous membranes are moist.      Pharynx: Oropharynx is clear.   Eyes:      Extraocular Movements: Extraocular movements intact.      Conjunctiva/sclera: Conjunctivae normal.   Cardiovascular:      Rate and Rhythm: Normal rate and regular rhythm.      Pulses: Normal pulses.      Heart sounds: Normal heart sounds.   Pulmonary:      Effort: Pulmonary effort is normal.      Breath sounds: Normal breath sounds.   Abdominal:      General: Bowel sounds are normal. There is no distension.      Palpations: Abdomen is soft.      Tenderness: There is no abdominal tenderness.   Skin:     General: Skin is warm.      Capillary Refill: Capillary refill takes less than 2 seconds.   Neurological:      Mental Status: She is alert. Mental status is at baseline.      Comments: Unable to communicate effectively   Psychiatric:         Mood and Affect: Mood normal.             Significant Labs: All pertinent labs within the past 24 hours have been reviewed.  CBC:   Recent Labs   Lab 09/03/24  1027 09/04/24  0735    WBC 5.46 5.09   HGB 14.7 13.9   HCT 51.3* 47.9    191     CMP:   Recent Labs   Lab 09/03/24  1027 09/04/24  0735   * 134*   K 4.7 6.2*    99   CO2 22* 21*   GLU 74 75   BUN 35* 49*   CREATININE 8.3* 10.1*   CALCIUM 10.6* 10.4   PROT 8.4 8.3   ALBUMIN 4.6 4.6   BILITOT 1.0 0.9   ALKPHOS 70 70   AST 25 24   ALT 9* 12   ANIONGAP 12 14       Significant Imaging: I have reviewed all pertinent imaging results/findings within the past 24 hours.

## 2024-09-04 NOTE — ASSESSMENT & PLAN NOTE
Nutrition consulted. Most recent weight and BMI monitored-     Measurements:  Wt Readings from Last 1 Encounters:   09/02/24 44.5 kg (98 lb)   Body mass index is 16.31 kg/m².    Patient has been screened and assessed by RD.    Malnutrition Type:  Context: acute illness or injury  Level: moderate    Malnutrition Characteristic Summary:  Weight Loss (Malnutrition): greater than 7.5% in 3 months  Energy Intake (Malnutrition): less than 75% for greater than 7 days  Subcutaneous Fat (Malnutrition): mild depletion  Muscle Mass (Malnutrition): mild depletion  Fluid Accumulation (Malnutrition): mild    Interventions/Recommendations (treatment strategy):  1. Continue Renal diet as tolerated. 2. Recommend Nepro with meals. 3.  to obtain daily menu choices.

## 2024-09-05 LAB
ALBUMIN SERPL BCP-MCNC: 4.1 G/DL (ref 3.5–5.2)
ALP SERPL-CCNC: 67 U/L (ref 55–135)
ALT SERPL W/O P-5'-P-CCNC: 14 U/L (ref 10–44)
ANION GAP SERPL CALC-SCNC: 8 MMOL/L (ref 8–16)
AST SERPL-CCNC: 28 U/L (ref 10–40)
BASOPHILS # BLD AUTO: 0.02 K/UL (ref 0–0.2)
BASOPHILS NFR BLD: 0.4 % (ref 0–1.9)
BILIRUB SERPL-MCNC: 0.7 MG/DL (ref 0.1–1)
BUN SERPL-MCNC: 42 MG/DL (ref 8–23)
CALCIUM SERPL-MCNC: 10.1 MG/DL (ref 8.7–10.5)
CHLORIDE SERPL-SCNC: 101 MMOL/L (ref 95–110)
CO2 SERPL-SCNC: 28 MMOL/L (ref 23–29)
CREAT SERPL-MCNC: 8.3 MG/DL (ref 0.5–1.4)
DIFFERENTIAL METHOD BLD: ABNORMAL
EOSINOPHIL # BLD AUTO: 0.8 K/UL (ref 0–0.5)
EOSINOPHIL NFR BLD: 15.3 % (ref 0–8)
ERYTHROCYTE [DISTWIDTH] IN BLOOD BY AUTOMATED COUNT: 21.4 % (ref 11.5–14.5)
EST. GFR  (NO RACE VARIABLE): 4.4 ML/MIN/1.73 M^2
GLUCOSE SERPL-MCNC: 70 MG/DL (ref 70–110)
HCT VFR BLD AUTO: 40.2 % (ref 37–48.5)
HGB BLD-MCNC: 12.1 G/DL (ref 12–16)
IMM GRANULOCYTES # BLD AUTO: 0.01 K/UL (ref 0–0.04)
IMM GRANULOCYTES NFR BLD AUTO: 0.2 % (ref 0–0.5)
LYMPHOCYTES # BLD AUTO: 0.7 K/UL (ref 1–4.8)
LYMPHOCYTES NFR BLD: 13.5 % (ref 18–48)
MAGNESIUM SERPL-MCNC: 2.3 MG/DL (ref 1.6–2.6)
MCH RBC QN AUTO: 26.5 PG (ref 27–31)
MCHC RBC AUTO-ENTMCNC: 30.1 G/DL (ref 32–36)
MCV RBC AUTO: 88 FL (ref 82–98)
MONOCYTES # BLD AUTO: 0.9 K/UL (ref 0.3–1)
MONOCYTES NFR BLD: 17.9 % (ref 4–15)
NEUTROPHILS # BLD AUTO: 2.6 K/UL (ref 1.8–7.7)
NEUTROPHILS NFR BLD: 52.7 % (ref 38–73)
NRBC BLD-RTO: 0 /100 WBC
PLATELET # BLD AUTO: 171 K/UL (ref 150–450)
PMV BLD AUTO: 9.7 FL (ref 9.2–12.9)
POTASSIUM SERPL-SCNC: 4.8 MMOL/L (ref 3.5–5.1)
PROT SERPL-MCNC: 7.4 G/DL (ref 6–8.4)
RBC # BLD AUTO: 4.56 M/UL (ref 4–5.4)
SODIUM SERPL-SCNC: 137 MMOL/L (ref 136–145)
WBC # BLD AUTO: 4.97 K/UL (ref 3.9–12.7)

## 2024-09-05 PROCEDURE — 86580 TB INTRADERMAL TEST: CPT | Performed by: NURSE PRACTITIONER

## 2024-09-05 PROCEDURE — 80053 COMPREHEN METABOLIC PANEL: CPT | Performed by: NURSE PRACTITIONER

## 2024-09-05 PROCEDURE — 30200315 PPD INTRADERMAL TEST REV CODE 302: Performed by: NURSE PRACTITIONER

## 2024-09-05 PROCEDURE — 97530 THERAPEUTIC ACTIVITIES: CPT | Mod: CQ

## 2024-09-05 PROCEDURE — 97166 OT EVAL MOD COMPLEX 45 MIN: CPT

## 2024-09-05 PROCEDURE — 12000002 HC ACUTE/MED SURGE SEMI-PRIVATE ROOM

## 2024-09-05 PROCEDURE — 25000003 PHARM REV CODE 250: Performed by: NURSE PRACTITIONER

## 2024-09-05 PROCEDURE — 97535 SELF CARE MNGMENT TRAINING: CPT

## 2024-09-05 PROCEDURE — 85025 COMPLETE CBC W/AUTO DIFF WBC: CPT | Performed by: NURSE PRACTITIONER

## 2024-09-05 PROCEDURE — 83735 ASSAY OF MAGNESIUM: CPT | Performed by: NURSE PRACTITIONER

## 2024-09-05 PROCEDURE — 25000003 PHARM REV CODE 250

## 2024-09-05 PROCEDURE — 25000003 PHARM REV CODE 250: Performed by: INTERNAL MEDICINE

## 2024-09-05 RX ORDER — MUPIROCIN 20 MG/G
OINTMENT TOPICAL 2 TIMES DAILY
Status: DISCONTINUED | OUTPATIENT
Start: 2024-09-05 | End: 2024-09-07 | Stop reason: HOSPADM

## 2024-09-05 RX ADMIN — CALCIUM ACETATE 1334 MG: 667 CAPSULE ORAL at 12:09

## 2024-09-05 RX ADMIN — APIXABAN 2.5 MG: 2.5 TABLET, FILM COATED ORAL at 09:09

## 2024-09-05 RX ADMIN — LATANOPROST 1 DROP: 50 SOLUTION/ DROPS OPHTHALMIC at 09:09

## 2024-09-05 RX ADMIN — SODIUM ZIRCONIUM CYCLOSILICATE 10 G: 10 POWDER, FOR SUSPENSION ORAL at 07:09

## 2024-09-05 RX ADMIN — TUBERCULIN PURIFIED PROTEIN DERIVATIVE 5 UNITS: 5 INJECTION, SOLUTION INTRADERMAL at 05:09

## 2024-09-05 RX ADMIN — LEVOTHYROXINE SODIUM 75 MCG: 0.03 TABLET ORAL at 05:09

## 2024-09-05 RX ADMIN — AMIODARONE HYDROCHLORIDE 100 MG: 100 TABLET ORAL at 07:09

## 2024-09-05 RX ADMIN — CALCIUM ACETATE 1334 MG: 667 CAPSULE ORAL at 07:09

## 2024-09-05 RX ADMIN — ATORVASTATIN CALCIUM 40 MG: 40 TABLET, FILM COATED ORAL at 09:09

## 2024-09-05 RX ADMIN — MUPIROCIN 1 G: 20 OINTMENT TOPICAL at 09:09

## 2024-09-05 RX ADMIN — CALCIUM ACETATE 1334 MG: 667 CAPSULE ORAL at 04:09

## 2024-09-05 RX ADMIN — MUPIROCIN 1 G: 20 OINTMENT TOPICAL at 02:09

## 2024-09-05 NOTE — PROGRESS NOTES
Counts include 234 beds at the [facility] Medicine  Progress Note    Patient Name: Tyra Isaac  MRN: [id number]  Patient Class: IP- Inpatient   Admission Date: 9/2/2024  Length of Stay: 1 days  Attending Physician: Anai Angela MD  Primary Care Provider: Kalpesh Goel MD        Subjective:     Principal Problem:Encephalopathy, metabolic        HPI:  The patient is an 85-year-old female with a past medical history of end-stage renal disease, hypertension, gastroparesis, atrial fibrillation, previous CVA, hyperlipidemia, who presented to emergency department for evaluation of gradually worsening generalized weakness.  Patient was recently admitted to the hospital in August and was discharged on August 23, 2024 after an admission for confusion weakness and tremors.  Patient was evaluated at that time and she had recently started on Reglan may which was attributed to causing her facial movements and tremors.  Patient continued to improve with rehab efforts and was discharged home.  Patient's son reports that the patient has been doing very well at home recently.  She was participating in PT and OT and eating well up until the past few days.  Patient's son reports that on Friday (today is Monday) that the patient became progressively more weak with a decreased appetite and garbled speech.  Patient was to have dialysis as she is HD Monday Wednesday Friday however she was too sick to go to dialysis today.  Dialysis clinic recommended patient son to bring patient to the ER for evaluation.  Patient is son denies any fever chills nausea vomiting diarrhea shortness of breath cough or any other complaints.    Patient was evaluated in the emergency department.  Patient was initially hypertensive upon arrival with systolic blood pressure in the 200s.  Patient was given clonidine and hydralazine in the emergency department which improved her blood pressure to systolic 129 on assessment.  Patient's labs consistent with  end-stage renal disease.  CT of brain without acute findings, chest x-ray without acute findings.  COVID and influenza screening negative.  TSH mildly elevated at 5.9 which is increased from 3 weeks ago when it was 3.748.  Ammonia negative, troponin mildly elevated at 31.3, this has also improved from 3 weeks ago when it was 177.  Patient will be admitted to the hospital for further evaluation with consultation to neurology and nephrology.    Overview/Hospital Course:  85-year-old female with a past medical history of ESRD on HD Monday Wednesday Friday, hypertension, gastroparesis, atrial fibrillation, previous CVA, hyperlipidemia who presented to the ER for evaluation of generalized weakness and slurred speech.  Patient was evaluated in the emergency department, where she was initially found to be hypertensive with a systolic blood pressure of 200.  CT without acute findings.  Labs consistent with end-stage renal disease.  Patient was admitted for further observation and evaluation by nephrology and neurology.  She was monitored on telemetry monitoring and remained hemodynamically stable.  Patient tolerated hemodialysis without issue.  Patient's mental status continues to slowly improve and patient's daughters reported to nursing staff that she is near her baseline.  Neurology recommended MRI of brain which will be obtained.    Interval History: mental status somewhat improved today.  Speaking more clearly.  Family would like placement.   to assist.    Review of Systems   Unable to perform ROS: Other     Objective:     Vital Signs (Most Recent):  Temp: 98 °F (36.7 °C) (09/05/24 1101)  Pulse: 80 (09/05/24 1101)  Resp: 18 (09/05/24 1101)  BP: (!) 163/71 (09/05/24 1101)  SpO2: 96 % (09/05/24 1101) Vital Signs (24h Range):  Temp:  [97.5 °F (36.4 °C)-98.5 °F (36.9 °C)] 98 °F (36.7 °C)  Pulse:  [74-83] 80  Resp:  [16-18] 18  SpO2:  [95 %-100 %] 96 %  BP: (132-177)/(60-87) 163/71     Weight: 44.5 kg (98  lb)  Body mass index is 16.31 kg/m².    Intake/Output Summary (Last 24 hours) at 9/5/2024 1236  Last data filed at 9/5/2024 0122  Gross per 24 hour   Intake 240 ml   Output 130 ml   Net 110 ml         Physical Exam  Constitutional:       General: She is not in acute distress.     Appearance: She is not toxic-appearing.   HENT:      Head: Normocephalic.      Mouth/Throat:      Mouth: Mucous membranes are moist.      Pharynx: Oropharynx is clear.   Eyes:      Extraocular Movements: Extraocular movements intact.      Conjunctiva/sclera: Conjunctivae normal.   Cardiovascular:      Rate and Rhythm: Normal rate and regular rhythm.      Pulses: Normal pulses.      Heart sounds: Normal heart sounds.   Pulmonary:      Effort: Pulmonary effort is normal.      Breath sounds: Normal breath sounds.   Abdominal:      General: Bowel sounds are normal. There is no distension.      Palpations: Abdomen is soft.      Tenderness: There is no abdominal tenderness.   Skin:     General: Skin is warm.      Capillary Refill: Capillary refill takes less than 2 seconds.   Neurological:      Mental Status: She is alert. Mental status is at baseline.      Comments: Speech clearer today, answers some questions appropriately.   Psychiatric:         Mood and Affect: Mood normal.             Significant Labs: All pertinent labs within the past 24 hours have been reviewed.  CBC:   Recent Labs   Lab 09/04/24  0735   WBC 5.09   HGB 13.9   HCT 47.9        CMP:   Recent Labs   Lab 09/04/24  0735   *   K 6.2*   CL 99   CO2 21*   GLU 75   BUN 49*   CREATININE 10.1*   CALCIUM 10.4   PROT 8.3   ALBUMIN 4.6   BILITOT 0.9   ALKPHOS 70   AST 24   ALT 12   ANIONGAP 14       Significant Imaging: I have reviewed all pertinent imaging results/findings within the past 24 hours.    Assessment/Plan:      * Encephalopathy, metabolic  Patient with HTN and ESRD history   CT brain negative for acute findings  CBC and CMP consistent with end-stage renal  disease   TSH mildly elevated  Ammonia 14  UA pending-pt is anuric  Neurology consulted - recommends MRI of brain today-Diffuse white matter changes, likely reflection of chronic microvascular ischemia with no acute findings.  Fall precautions  Consult PT/OT   Supportive care       Hypertensive cardiovascular-renal disease, stage 5 chronic kidney disease or end stage renal disease, with heart failure  Creatine stable for now. BMP reviewed- noted Estimated Creatinine Clearance: 2.9 mL/min (A) (based on SCr of 10.1 mg/dL (H)). according to latest data. Based on current GFR, CKD stage is end stage.  Monitor UOP and serial BMP and adjust therapy as needed. Renally dose meds. Avoid nephrotoxic medications and procedures. Consult to nephrology for HD MWF     Hyperkalemia  Hyperkalemia is likely due to ESRD.The patients most recent potassium results are listed below.  Recent Labs     09/03/24  1027 09/04/24  0735   K 4.7 6.2*       Plan  - Monitor for arrhythmias with EKG and/or continuous telemetry.   - Treat the hyperkalemia with Potassium Binders and HD .   - Monitor potassium: Daily-labs ordered for this morning are still pending.          Moderate malnutrition  Nutrition consulted. Most recent weight and BMI monitored-     Measurements:  Wt Readings from Last 1 Encounters:   09/02/24 44.5 kg (98 lb)   Body mass index is 16.31 kg/m².    Patient has been screened and assessed by RD.    Malnutrition Type:  Context: acute illness or injury  Level: moderate    Malnutrition Characteristic Summary:  Weight Loss (Malnutrition): greater than 7.5% in 3 months  Energy Intake (Malnutrition): less than 75% for greater than 7 days  Subcutaneous Fat (Malnutrition): mild depletion  Muscle Mass (Malnutrition): mild depletion  Fluid Accumulation (Malnutrition): mild    Interventions/Recommendations (treatment strategy):  1. Continue Renal diet as tolerated. 2. Recommend Nepro with meals. 3.  to obtain daily menu  choices.      Elevated TSH    TSH 5, was 3 on prior admission a few weeks ago  Start Synthroid 75 mcg daily    Atrial fibrillation  Patient with Persistent (7 days or more) atrial fibrillation which is controlled currently with Amiodarone. Patient is currently in sinus rhythm.[id number] Score: 4. HASBLED Score: 3. Anticoagulation indicated. Anticoagulation done with Eliquis .      VTE Risk Mitigation (From admission, onward)           Ordered     apixaban tablet 2.5 mg  2 times daily         09/02/24 1146     IP VTE HIGH RISK PATIENT  Once         09/02/24 1005     Place sequential compression device  Until discontinued         09/02/24 1005                    Discharge Planning   ILAN: 9/6/2024     Code Status: Full Code   Is the patient medically ready for discharge?:     Reason for patient still in hospital (select all that apply): Patient trending condition and Pending disposition  Discharge Plan A: Skilled Nursing Facility                  KELBY Le  Department of Hospital Medicine   [facility]

## 2024-09-05 NOTE — PLAN OF CARE
Problem: Fall Injury Risk  Goal: Absence of Fall and Fall-Related Injury  Outcome: Progressing     Problem: Malnutrition  Goal: Improved Nutritional Intake  Outcome: Progressing     Problem: Skin Injury Risk Increased  Goal: Skin Health and Integrity  Outcome: Progressing     Problem: Wound  Goal: Skin Health and Integrity  Outcome: Progressing

## 2024-09-05 NOTE — PT/OT/SLP EVAL
Occupational Therapy   Evaluation    Name: Tyra Isaac  MRN: [id number]  Admitting Diagnosis: Encephalopathy, metabolic  Recent Surgery: * No surgery found *      Recommendations:     Discharge Recommendations: Moderate Intensity Therapy  Discharge Equipment Recommendations:   (TBD)  Barriers to discharge:   (increased physical assistance with ADLs.)    Assessment:     Tyra Isaac is a 84 y.o. female with a medical diagnosis of Encephalopathy, metabolic.  She presents with general weakness and significant dyskinesia. Performance deficits affecting function: weakness, impaired endurance, impaired self care skills, impaired functional mobility, gait instability, impaired balance, decreased safety awareness, decreased lower extremity function, decreased upper extremity function.      Rehab Prognosis: Fair; patient would benefit from acute skilled OT services to address these deficits and reach maximum level of function.       Plan:     Patient to be seen 5 x/week to address the above listed problems via self-care/home management, therapeutic activities, therapeutic exercises  Plan of Care Expires: 10/05/24  Plan of Care Reviewed with: patient    Subjective     Chief Complaint: General weakness and significant dyskinesia  Patient/Family Comments/goals: Reduced dyskinesia, improved cognition, functional mobility and ADL independence.    Occupational Profile:  Living Environment: lives with son and DIL in 1 story home, 3 steps to enter.  Previous level of function: Requires assistance with bathing, dressing and toileting. Able to ambulate without assistance using a rollator.  Roles and Routines: Limited homemaker  Equipment Used at Home: wheelchair, rollator, bedside commode, cane, straight  Assistance upon Discharge: Family     Pain/Comfort:  Pain Rating 1: 0/10  Pain Rating Post-Intervention 1: 0/10    Patients cultural, spiritual, Hinduism conflicts given the current situation: no    Objective:      Communicated with: nurse prior to session.  Patient found HOB elevated with telemetry, peripheral IV upon OT entry to room.    General Precautions: Standard, fall  Orthopedic Precautions: N/A  Braces: N/A  Respiratory Status: Room air    Occupational Performance:    Bed Mobility:    Patient completed Scooting/Bridging with maximal assistance  Patient completed Supine to Sit with maximal assistance  Patient completed Sit to Supine with maximal assistance  Performed unsupported sitting EOB with moderate assistance.    Functional Mobility/Transfers:  Patient completed Sit <> Stand Transfer with total assistance  with  hand-held assist and rolling walker     Activities of Daily Living:  Lower Body Dressing: maximal assistance to don socks sitting EOB.    Cognitive/Visual Perceptual:  Cognitive/Psychosocial Skills:     -       Oriented to: Person   -       Follows Commands/attention:Follows one-step commands  -       Communication: mumbled speech, difficult to understand  -       Memory: Impaired STM and Poor immediate recall  -       Safety awareness/insight to disability: impaired   -       Mood/Affect/Coping skills/emotional control: Flat affect  Visual/Perceptual:      -Intact Acuity    Physical Exam:  Balance:    -       Sitting: Maximal Assist  Motor Planning:    -       Dyskinesia during movement  Upper Extremity Range of Motion:     -       Right Upper Extremity: WFL  -       Left Upper Extremity: WFL  Upper Extremity Strength:    -       Right Upper Extremity: 3/5  -       Left Upper Extremity: 3/5   Strength:    -       Right Upper Extremity: fair  -       Left Upper Extremity: fair  Fine Motor Coordination:    -       Intact    AMPAC 6 Click ADL:  AMPAC Total Score: 12    Treatment & Education:  Patient educated on the purpose of Occupational Therapy and the importance of getting OOB.    Patient left HOB elevated with all lines intact, call button in reach, and bed alarm on    GOALS:    Multidisciplinary Problems       Occupational Therapy Goals          Problem: Occupational Therapy    Goal Priority Disciplines Outcome Interventions   Occupational Therapy Goal     OT, PT/OT     Description: Goals to be met by: 10/5/2024     Patient will increase functional independence with ADLs by performing:    UE Dressing with Stand-by Assistance.  LE Dressing with Stand-by Assistance.  Grooming while seated with Stand-by Assistance.  Toileting from toilet with Stand-by Assistance for hygiene and clothing management.   Toilet transfer to bedside commode with Stand-by Assistance.  Perform 30 minutes sitting/standing Adl activity with no LOB.                         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    Osteopenia of multiple sites 3/9/2018    Based upon bone density measurements. Patient also has chronic kidney disease.    Stroke 11/2016         Past Surgical History:   Procedure Laterality Date    ANGIOGRAM, CORONARY, WITH LEFT HEART CATHETERIZATION N/A 2/7/2022    Procedure: Angiogram, Coronary, with Left Heart Cath;  Surgeon: Gino Leal MD;  Location: [facility] CATH/EP LAB;  Service: Cardiology;  Laterality: N/A;    CARDIAC SURGERY      stents    EYE SURGERY      WRIST SURGERY         Time Tracking:     OT Date of Treatment: 09/05/24  OT Start Time: 0912  OT Stop Time: 0928  OT Total Time (min): 16 min    Billable Minutes:Evaluation 6  Self Care/Home Management 10    9/5/2024

## 2024-09-05 NOTE — ASSESSMENT & PLAN NOTE
Hyperkalemia is likely due to ESRD.The patients most recent potassium results are listed below.  Recent Labs     09/03/24  1027 09/04/24  0735   K 4.7 6.2*       Plan  - Monitor for arrhythmias with EKG and/or continuous telemetry.   - Treat the hyperkalemia with Potassium Binders and HD .   - Monitor potassium: Daily-labs ordered for this morning are still pending.

## 2024-09-05 NOTE — PLAN OF CARE
[facility] sent SNF in a 30 mile radius. [facility] put a note on referral that [facility] is pt's family first choice       09/05/24 3000   Post-Acute Status   Post-Acute Authorization Placement   Post-Acute Placement Status Referrals Sent   Discharge Plan   Discharge Plan A Skilled Nursing Facility

## 2024-09-05 NOTE — PT/OT/SLP PROGRESS
Physical Therapy Treatment    Patient Name:  Tyra Isaac   MRN:  [id number]    Recommendations:     Discharge Recommendations: Moderate Intensity Therapy  Discharge Equipment Recommendations: none  Barriers to discharge:  increased assist with mobility, decreased activity tolerance, balance deficits, tremors    Assessment:     Tyra Isaac is a 84 y.o. female admitted with a medical diagnosis of Encephalopathy, metabolic.  She presents with the following impairments/functional limitations: weakness, impaired endurance, impaired functional mobility, gait instability, impaired balance, decreased safety awareness, pain, impaired cardiopulmonary response to activity, impaired coordination, decreased upper extremity function, impaired cognition.    Pt agreeable to visit. Pt reports B hip pain. Pt instructed to transfer to EOB by advancing BLE off the bed and using rail for upper body and weight shifting to sitting. Pt required stand by assist and verbal cuing to initiate progressing to min assist x 2 to complete transfer. Pt less tremulous when performing mobility on her own and becomes more tremulous when therapist was assisting to scoot her hips forward.    Standing attempt deferred as pt attempted standing with OT not too long ago and did not want to attempt again.    Pt returned to bed with min to mod assist. Verbal cuing for sequencing. Pt required max assist x 2 to scoot up in bed.    Rehab Prognosis: Fair; patient would benefit from acute skilled PT services to address these deficits and reach maximum level of function.    Recent Surgery: * No surgery found *      Plan:     During this hospitalization, patient to be seen 5 x/week to address the identified rehab impairments via therapeutic activities, therapeutic exercises, gait training, neuromuscular re-education and progress toward the following goals:    Plan of Care Expires:  10/02/24    Subjective     Chief Complaint: B hip pain  Patient/Family  Comments/goals: to get better  Pain/Comfort:  Pain Rating 1: other (see comments) (not rated)  Location - Side 1: Bilateral  Location - Orientation 1: generalized  Location 1: hip  Pain Addressed 1: Reposition, Distraction, Cessation of Activity  Pain Rating Post-Intervention 1: other (see comments) (not rated)      Objective:     Communicated with nurse prior to session.  Patient found HOB elevated with peripheral IV, telemetry, PureWick, bed alarm upon PT entry to room.     General Precautions: Standard, fall  Orthopedic Precautions: N/A  Braces: N/A  Respiratory Status: Room air     Functional Mobility:  Bed Mobility:     Scooting: maximal assistance and of 2 persons  Supine to Sit: stand by assistance and min assistance x 2 to complete transfer  Sit to Supine: minimum assistance, moderate assistance, and of 2 persons      AM-PAC 6 CLICK MOBILITY          Treatment & Education:  Pt educated on importance of time OOB, importance of intermittent mobility, safe techniques for transfers/ambulation, discharge recommendations/options, and use of call light for assistance and fall prevention.      Patient left HOB elevated with all lines intact, call button in reach, and bed alarm on..    GOALS:   Multidisciplinary Problems       Physical Therapy Goals          Problem: Physical Therapy    Goal Priority Disciplines Outcome Goal Variances Interventions   Physical Therapy Goal     PT, PT/OT Progressing     Description: Goals to be met by: 10/2/24     Patient will increase functional independence with mobility by performin. Supine to sit with MInimal Assistance  2. Sit to stand transfer with Minimal Assistance  3. Bed to chair transfer with Minimal Assistance using Rolling Walker  4. Gait  x 25 feet with Minimal Assistance using Rolling Walker.                                 Time Tracking:     PT Received On: 24  PT Start Time: 929     PT Stop Time: 941  PT Total Time (min): 12 min     Billable Minutes:  Gait Training 12    Treatment Type: Treatment  PT/PTA: PTA     Number of PTA visits since last PT visit: 3     09/05/2024

## 2024-09-05 NOTE — PROGRESS NOTES
INPATIENT NEPHROLOGY CONSULT   [facility]    Patient Name: Tyra Isaac  Date: 09/05/2024    Reason for consultation:   ESRD    Chief Complaint:   Chief Complaint   Patient presents with    Weakness    Slurred speech     Son states she was talking fine last night and this morning she woke up with slurred speech and even weaker than she was yesterday. She was suppose to be at dialysis today but they told her she was too weak to come     History of Present Illness:  84-year-old female presented emergency department with gradually worsening generalized weakness. Patient was feeling weak yesterday and had slurred speech and has difficulty walking over the past 2-3 days per son. Patient on Eliquis and has history of arrhythmias. Patient has difficulty answering questions and patient does have generalized weakness and has some difficulty following commands. Patient however is understanding some questions and following some commands. Patient had previous history of being encephalopathic. Son describes this as more gradual onset of symptoms and patient was gradually getting worse. Patient has history of end-stage renal disease and is scheduled to get dialysis this morning. As patient was extremely weak and they called dialysis and was told to come here. Patient has difficulty walking on her own and needed assistance. Patient is able to move all extremities however has weakness in all extremities and has difficulty walking secondary to generalized weakness. Patient per son was gradually deteriorating. Patient was doing physical therapy and responding well last week however yesterday all day yesterday patient was feeling shaky and had slurring of speech and was having weakness. Consulted for dialysis.     Interval History:  9/2- admit /90, on RA, CT head, neg, CXR clear, BNP 1226 (3400 in Aug), ordered routine HD/UF, she was given clonidine and IV hydralazine and BP improved- seen in ER-  reports not being able to stand/walk- continues with same twitching noted during prior admissions along with same speech changes noted during prior admissions  9/3 VSS. HD today.  9/4 VSS. Hyperkalemia noted, suspect spurious. HD today. Give Lokelma.  9/5 VSS. Labs pending. HD tomorrow or PRN.    Plan of Care:    Hyponatremia  Hyperkalemia  ESRD on HD MWF  Hypertensive urgency  Secondary HPT  Anemia of CKD    Plan:    - started Lokelma, adjust dialysate as needed  - continue HD MWF or PRN  - on midodrine PRN for orthostatic hypotension- 1-3L UF as tolerated  - resumed Ca containing binder with meals  - no acute BRAYAN needs, goal Hgb 8-10  - limit free water intake as tolerated    Thank you for allowing us to participate in this patient's care. We will continue to follow.    Vital Signs:  Temp Readings from Last 3 Encounters:   09/05/24 98.2 °F (36.8 °C) (Oral)   08/17/24 98.8 °F (37.1 °C) (Oral)   08/09/24 98.2 °F (36.8 °C) (Oral)       Pulse Readings from Last 3 Encounters:   09/05/24 81   08/20/24 63   08/17/24 64       BP Readings from Last 3 Encounters:   09/05/24 (!) 162/67   08/20/24 (!) 131/53   08/17/24 (!) 145/62       Weight:  Wt Readings from Last 3 Encounters:   09/02/24 44.5 kg (98 lb)   08/13/24 47.6 kg (104 lb 15 oz)   08/09/24 46.3 kg (102 lb)       Past Medical & Surgical History:  Past Medical History:   Diagnosis Date    A-fib     Anxiety     Depression     Disorder of kidney and ureter     Encephalopathy acute 1/1/2018    End stage kidney disease 6/17/2017    Gout     Hyperlipidemia     Hypertension     Moderate episode of recurrent major depressive disorder 1/17/2018    Nephropathy hypertensive, 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    Osteopenia of multiple sites 3/9/2018    Based upon bone density measurements. Patient also has chronic kidney disease.    Stroke  11/2016       Past Surgical History:   Procedure Laterality Date    ANGIOGRAM, CORONARY, WITH LEFT HEART CATHETERIZATION N/A 2/7/2022    Procedure: Angiogram, Coronary, with Left Heart Cath;  Surgeon: Gino Leal MD;  Location: [facility] CATH/EP LAB;  Service: Cardiology;  Laterality: N/A;    CARDIAC SURGERY      stents    EYE SURGERY      WRIST SURGERY         Past Social History:  Social History     Socioeconomic History    Marital status:      Spouse name: Aria Isaac    Number of children: 3   Occupational History    Occupation: Not working   Tobacco Use    Smoking status: Never    Smokeless tobacco: Never   Substance and Sexual Activity    Alcohol use: No    Drug use: No    Sexual activity: Not Currently     Social Determinants of Health     Financial Resource Strain: Patient Unable To Answer (9/3/2024)    Overall Financial Resource Strain (CARDIA)     Difficulty of Paying Living Expenses: Patient unable to answer   Recent Concern: Financial Resource Strain - Medium Risk (7/11/2024)    Overall Financial Resource Strain (CARDIA)     Difficulty of Paying Living Expenses: Somewhat hard   Food Insecurity: Patient Unable To Answer (9/3/2024)    Hunger Vital Sign     Worried About Running Out of Food in the Last Year: Patient unable to answer     Ran Out of Food in the Last Year: Patient unable to answer   Transportation Needs: Patient Unable To Answer (9/3/2024)    TRANSPORTATION NEEDS     Transportation : Patient unable to answer   Physical Activity: Inactive (9/3/2024)    Exercise Vital Sign     Days of Exercise per Week: 0 days     Minutes of Exercise per Session: 0 min   Stress: Patient Unable To Answer (9/3/2024)    [employer] [place] [employer] - Occupational Stress Questionnaire     Feeling of Stress : Patient unable to answer   Recent Concern: Stress - Stress Concern Present (8/20/2024)    [employer] [place] [employer] - Occupational Stress Questionnaire     Feeling of  Stress : Rather much   Housing Stability: Patient Unable To Answer (9/3/2024)    Housing Stability Vital Sign     Unable to Pay for Housing in the Last Year: Patient unable to answer     Homeless in the Last Year: Patient unable to answer       Medications:  Scheduled Meds:   amiodarone  100 mg Oral Daily    apixaban  2.5 mg Oral BID    atorvastatin  40 mg Oral QHS    calcium acetate(phosphat bind)  1,334 mg Oral TID WM    latanoprost  1 drop Both Eyes QHS    levothyroxine  75 mcg Oral Before breakfast    sodium zirconium cyclosilicate  10 g Oral Daily     Continuous Infusions:      PRN Meds:.  Current Facility-Administered Medications:     acetaminophen, 650 mg, Oral, Q8H PRN    acetaminophen, 650 mg, Oral, Q4H PRN    aluminum-magnesium hydroxide-simethicone, 30 mL, Oral, QID PRN    dextrose 50%, 12.5 g, Intravenous, PRN    dextrose 50%, 25 g, Intravenous, PRN    glucagon (human recombinant), 1 mg, Intramuscular, PRN    glucose, 16 g, Oral, PRN    glucose, 24 g, Oral, PRN    hydrALAZINE, 10 mg, Intravenous, Q6H PRN    HYDROcodone-acetaminophen, 1 tablet, Oral, Q6H PRN    melatonin, 6 mg, Oral, Nightly PRN    naloxone, 0.02 mg, Intravenous, PRN    ondansetron, 4 mg, Intravenous, Q6H PRN    senna-docusate 8.6-50 mg, 1 tablet, Oral, BID PRN    sodium chloride 0.9%, 10 mL, Intravenous, Q12H PRN  No current facility-administered medications on file prior to encounter.     Current Outpatient Medications on File Prior to Encounter   Medication Sig Dispense Refill    amiodarone (PACERONE) 100 MG Tab Take 1 tablet (100 mg total) by mouth once daily. (Patient taking differently: Take 100 mg by mouth every evening.) 30 tablet 0    atorvastatin (LIPITOR) 40 MG tablet Take 40 mg by mouth every evening.      b complex vitamins tablet Take 1 tablet by mouth every evening.      calcium acetate,phosphat bind, (PHOSLO) 667 mg capsule Take 1,334 mg by mouth 3 (three) times daily with meals. TAKE 2 CAPSULES BY MOUTH WITH MEALS AND 1  CAPSULE WITH EACH SNACK      ELIQUIS 2.5 mg Tab Take 2.5 mg by mouth 2 (two) times daily.      latanoprost 0.005 % ophthalmic solution Place 1 drop into both eyes every evening.      midodrine (PROAMATINE) 10 MG tablet Take 1 tablet (10 mg total) by mouth 3 (three) times daily with meals. 270 tablet 0    ondansetron (ZOFRAN-ODT) 4 MG TbDL Take 1 tablet (4 mg total) by mouth every 6 (six) hours as needed (nausea). 30 tablet 5       Allergies:  Cyclobenzaprine, Fish containing products, Peanut, and Tramadol    Past Family History:  Reviewed; refer to Hospitalist Admission Note    Review of Systems:  Review of Systems - All 14 systems reviewed and negative, except as noted in HPI    Physical Exam:  General Appearance:    NAD, AAO x 3, cooperative, appears stated age   Head:    Normocephalic, atraumatic   Eyes:    PER, EOMI, and conjunctiva/sclera clear bilaterally       Mouth:   Moist mucus membranes, no thrush or oral lesions,       normal dentition   Back:     No CVA tenderness   Lungs:     Clear to auscultation bilaterally, no wheezes, crackles,           rales or rhonchi, symmetric air movement, respirations unlabored   Chest wall:    No tenderness or deformity   Heart:    Regular rate and rhythm, S1 and S2 normal, no murmur, rub   or gallop   Abdomen:     Soft, non-tender, non-distended, bowel sounds active all four   quadrants, no RT or guarding, no masses, no organomegaly   Extremities:   Warm and well perfused, distal pulses are intact, no             cyanosis or peripheral edema   MSK:   No joint or muscle swelling, tenderness or deformity   Skin:   Skin color, texture, turgor normal, no rashes or lesions   Neurologic/Psychiatric:   CNII-XII intact, normal strength and sensation       throughout, no asterixis; normal affect, memory, judgement     and insight      Results:  Recent Labs   Lab 09/02/24  0745 09/03/24  1027 09/04/24  0735   * 134* 134*   K 5.0 4.7 6.2*   CL 91* 100 99   CO2 29 22* 21*   BUN  "58* 35* 49*   CREATININE 11.5* 8.3* 10.1*   GLU 98 74 75       Recent Labs   Lab 09/02/24  0745 09/03/24  1027 09/04/24  0735   CALCIUM 10.0 10.6* 10.4   ALBUMIN 4.3 4.6 4.6   MG  --  2.3 2.3             No results for input(s): "POCTGLUCOSE" in the last 168 hours.    Recent Labs   Lab 01/26/22  1525 06/28/24  0553   Hemoglobin A1C 5.3 5.1       Recent Labs   Lab 09/02/24  0745 09/03/24  1027 09/04/24  0735   WBC 5.06 5.46 5.09   HGB 12.4 14.7 13.9   HCT 42.0 51.3* 47.9    167 191   MCV 89 90 88   MCHC 29.5* 28.7* 29.0*   MONO 12.6  0.6 9.5  0.5 14.5  0.7   EOSINOPHIL 14.4* 16.3* 12.0*       Recent Labs   Lab 09/02/24  0745 09/03/24  1027 09/04/24  0735   BILITOT 0.7 1.0 0.9   PROT 7.6 8.4 8.3   ALBUMIN 4.3 4.6 4.6   ALKPHOS 65 70 70   ALT 11 9* 12   AST 26 25 24       Recent Labs   Lab 12/04/23  0959 12/20/23  1247 06/03/24  1541 08/12/24  1233   Color, UA Yellow Yellow Yellow Yellow   Appearance, UA Clear Clear Clear Clear   pH, UA 8.0 6.0 8.0 8.0   Specific [place], UA 1.015 1.015 1.010 1.010   Protein, UA 2+ A 2+ A Negative 1+ A   Glucose, UA 1+ A Negative Negative Negative   Ketones, UA Trace A Negative Negative Negative   Urobilinogen, UA Negative Negative Negative Negative   Bilirubin (UA) Negative Negative Negative Negative   Occult Blood UA 2+ A Negative Negative 1+ A   Nitrite, UA Negative Negative Negative Negative   RBC, UA 41 H 0  --  0   WBC, UA 2 3  --  4   Bacteria Negative Rare  --  None   Hyaline Casts, UA 6 A 8 A  --  0       Recent Labs   Lab 11/10/23  1529 12/20/23  1207 07/15/24  0621 09/02/24  0704   POC PH 7.391 7.390 7.345 L  --    POC PCO2 37.2 37.3 58.3 H  --    POC HCO3 22.6 L 22.6 L 31.8 H  --    POC PO2 53 LL 99 25 LL  --    POC SATURATED O2 87 98 39  --    POC BE -2 -2 6 H  --    Sample ARTERIAL ARTERIAL VENOUS VENOUS       Microbiology Results (last 7 days)       Procedure Component Value Units Date/Time    Blood culture x two cultures. Draw prior to antibiotics. " [[id number]] Collected: 09/02/24 0816    Order Status: Completed Specimen: Blood from Peripheral, Hand, Left Updated: 09/04/24 1032     Blood Culture, Routine No Growth to date      No Growth to date      No Growth to date    Narrative:      Aerobic and anaerobic  Collection has been rescheduled by [facility] at 09/02/2024 08:01 Reason:   Unable to collect  Collection has been rescheduled by [facility] at 09/02/2024 08:01 Reason:   Unable to collect    Blood culture x two cultures. Draw prior to antibiotics. [[id number]] Collected: 09/02/24 0817    Order Status: Completed Specimen: Blood from Peripheral, Antecubital, Left Updated: 09/04/24 1032     Blood Culture, Routine No Growth to date      No Growth to date      No Growth to date    Narrative:      Aerobic and anaerobic  Collection has been rescheduled by [facility] at 09/02/2024 08:01 Reason:   Unable to collect  Collection has been rescheduled by [facility] at 09/02/2024 08:01 Reason:   Unable to collect          I have spent > minutes providing care for this patient for the above diagnoses. These services have included chart/data/imaging review, evaluation, exam, formulation of plan, , note preparation, and discussions with staff involved in this patient's care.    Raleigh Yee MD  [facility] [address] [address]  [phone] (p)  [phone] (f)

## 2024-09-05 NOTE — SUBJECTIVE & OBJECTIVE
Interval History: mental status somewhat improved today.  Speaking more clearly.  Family would like placement.   to assist.    Review of Systems   Unable to perform ROS: Other     Objective:     Vital Signs (Most Recent):  Temp: 98 °F (36.7 °C) (09/05/24 1101)  Pulse: 80 (09/05/24 1101)  Resp: 18 (09/05/24 1101)  BP: (!) 163/71 (09/05/24 1101)  SpO2: 96 % (09/05/24 1101) Vital Signs (24h Range):  Temp:  [97.5 °F (36.4 °C)-98.5 °F (36.9 °C)] 98 °F (36.7 °C)  Pulse:  [74-83] 80  Resp:  [16-18] 18  SpO2:  [95 %-100 %] 96 %  BP: (132-177)/(60-87) 163/71     Weight: 44.5 kg (98 lb)  Body mass index is 16.31 kg/m².    Intake/Output Summary (Last 24 hours) at 9/5/2024 1236  Last data filed at 9/5/2024 0122  Gross per 24 hour   Intake 240 ml   Output 130 ml   Net 110 ml         Physical Exam  Constitutional:       General: She is not in acute distress.     Appearance: She is not toxic-appearing.   HENT:      Head: Normocephalic.      Mouth/Throat:      Mouth: Mucous membranes are moist.      Pharynx: Oropharynx is clear.   Eyes:      Extraocular Movements: Extraocular movements intact.      Conjunctiva/sclera: Conjunctivae normal.   Cardiovascular:      Rate and Rhythm: Normal rate and regular rhythm.      Pulses: Normal pulses.      Heart sounds: Normal heart sounds.   Pulmonary:      Effort: Pulmonary effort is normal.      Breath sounds: Normal breath sounds.   Abdominal:      General: Bowel sounds are normal. There is no distension.      Palpations: Abdomen is soft.      Tenderness: There is no abdominal tenderness.   Skin:     General: Skin is warm.      Capillary Refill: Capillary refill takes less than 2 seconds.   Neurological:      Mental Status: She is alert. Mental status is at baseline.      Comments: Speech clearer today, answers some questions appropriately.   Psychiatric:         Mood and Affect: Mood normal.             Significant Labs: All pertinent labs within the past 24 hours have been  reviewed.  CBC:   Recent Labs   Lab 09/04/24  0735   WBC 5.09   HGB 13.9   HCT 47.9        CMP:   Recent Labs   Lab 09/04/24  0735   *   K 6.2*   CL 99   CO2 21*   GLU 75   BUN 49*   CREATININE 10.1*   CALCIUM 10.4   PROT 8.3   ALBUMIN 4.6   BILITOT 0.9   ALKPHOS 70   AST 24   ALT 12   ANIONGAP 14       Significant Imaging: I have reviewed all pertinent imaging results/findings within the past 24 hours.

## 2024-09-06 VITALS
RESPIRATION RATE: 16 BRPM | SYSTOLIC BLOOD PRESSURE: 132 MMHG | HEIGHT: 65 IN | DIASTOLIC BLOOD PRESSURE: 59 MMHG | HEART RATE: 70 BPM | WEIGHT: 98 LBS | BODY MASS INDEX: 16.33 KG/M2 | OXYGEN SATURATION: 98 % | TEMPERATURE: 98 F

## 2024-09-06 PROBLEM — F03.90 DEMENTIA: Status: ACTIVE | Noted: 2024-01-01

## 2024-09-06 LAB
ALBUMIN SERPL BCP-MCNC: 4.1 G/DL (ref 3.5–5.2)
ALP SERPL-CCNC: 68 U/L (ref 55–135)
ALT SERPL W/O P-5'-P-CCNC: 13 U/L (ref 10–44)
ANION GAP SERPL CALC-SCNC: 13 MMOL/L (ref 8–16)
AST SERPL-CCNC: 28 U/L (ref 10–40)
BASOPHILS # BLD AUTO: 0.02 K/UL (ref 0–0.2)
BASOPHILS NFR BLD: 0.4 % (ref 0–1.9)
BILIRUB SERPL-MCNC: 0.8 MG/DL (ref 0.1–1)
BUN SERPL-MCNC: 49 MG/DL (ref 8–23)
CALCIUM SERPL-MCNC: 10.1 MG/DL (ref 8.7–10.5)
CHLORIDE SERPL-SCNC: 102 MMOL/L (ref 95–110)
CO2 SERPL-SCNC: 23 MMOL/L (ref 23–29)
CREAT SERPL-MCNC: 9.3 MG/DL (ref 0.5–1.4)
DIFFERENTIAL METHOD BLD: ABNORMAL
EOSINOPHIL # BLD AUTO: 0.9 K/UL (ref 0–0.5)
EOSINOPHIL NFR BLD: 15.7 % (ref 0–8)
ERYTHROCYTE [DISTWIDTH] IN BLOOD BY AUTOMATED COUNT: 21.4 % (ref 11.5–14.5)
EST. GFR  (NO RACE VARIABLE): 3.8 ML/MIN/1.73 M^2
GLUCOSE SERPL-MCNC: 82 MG/DL (ref 70–110)
HCT VFR BLD AUTO: 42.8 % (ref 37–48.5)
HGB BLD-MCNC: 12.6 G/DL (ref 12–16)
IMM GRANULOCYTES # BLD AUTO: 0.01 K/UL (ref 0–0.04)
IMM GRANULOCYTES NFR BLD AUTO: 0.2 % (ref 0–0.5)
LYMPHOCYTES # BLD AUTO: 0.7 K/UL (ref 1–4.8)
LYMPHOCYTES NFR BLD: 12.6 % (ref 18–48)
MAGNESIUM SERPL-MCNC: 2.3 MG/DL (ref 1.6–2.6)
MCH RBC QN AUTO: 26.3 PG (ref 27–31)
MCHC RBC AUTO-ENTMCNC: 29.4 G/DL (ref 32–36)
MCV RBC AUTO: 89 FL (ref 82–98)
MONOCYTES # BLD AUTO: 0.9 K/UL (ref 0.3–1)
MONOCYTES NFR BLD: 16.4 % (ref 4–15)
NEUTROPHILS # BLD AUTO: 3 K/UL (ref 1.8–7.7)
NEUTROPHILS NFR BLD: 54.7 % (ref 38–73)
NRBC BLD-RTO: 0 /100 WBC
PLATELET # BLD AUTO: 144 K/UL (ref 150–450)
PMV BLD AUTO: 9.9 FL (ref 9.2–12.9)
POTASSIUM SERPL-SCNC: 5 MMOL/L (ref 3.5–5.1)
PROT SERPL-MCNC: 7.4 G/DL (ref 6–8.4)
RBC # BLD AUTO: 4.79 M/UL (ref 4–5.4)
SODIUM SERPL-SCNC: 138 MMOL/L (ref 136–145)
WBC # BLD AUTO: 5.49 K/UL (ref 3.9–12.7)

## 2024-09-06 PROCEDURE — 80053 COMPREHEN METABOLIC PANEL: CPT | Performed by: NURSE PRACTITIONER

## 2024-09-06 PROCEDURE — 25000003 PHARM REV CODE 250: Performed by: NURSE PRACTITIONER

## 2024-09-06 PROCEDURE — 36415 COLL VENOUS BLD VENIPUNCTURE: CPT | Performed by: NURSE PRACTITIONER

## 2024-09-06 PROCEDURE — 25000003 PHARM REV CODE 250

## 2024-09-06 PROCEDURE — 83735 ASSAY OF MAGNESIUM: CPT | Performed by: NURSE PRACTITIONER

## 2024-09-06 PROCEDURE — 25000003 PHARM REV CODE 250: Performed by: INTERNAL MEDICINE

## 2024-09-06 PROCEDURE — 90935 HEMODIALYSIS ONE EVALUATION: CPT

## 2024-09-06 PROCEDURE — 12000002 HC ACUTE/MED SURGE SEMI-PRIVATE ROOM

## 2024-09-06 PROCEDURE — 85025 COMPLETE CBC W/AUTO DIFF WBC: CPT | Performed by: NURSE PRACTITIONER

## 2024-09-06 RX ADMIN — AMIODARONE HYDROCHLORIDE 100 MG: 100 TABLET ORAL at 08:09

## 2024-09-06 RX ADMIN — LEVOTHYROXINE SODIUM 75 MCG: 0.03 TABLET ORAL at 06:09

## 2024-09-06 RX ADMIN — SODIUM ZIRCONIUM CYCLOSILICATE 10 G: 10 POWDER, FOR SUSPENSION ORAL at 08:09

## 2024-09-06 RX ADMIN — APIXABAN 2.5 MG: 2.5 TABLET, FILM COATED ORAL at 08:09

## 2024-09-06 RX ADMIN — MUPIROCIN 1 G: 20 OINTMENT TOPICAL at 08:09

## 2024-09-06 RX ADMIN — CALCIUM ACETATE 1334 MG: 667 CAPSULE ORAL at 08:09

## 2024-09-06 NOTE — PT/OT/SLP PROGRESS
Physical Therapy      Patient Name:  Tyra Isaac   MRN:  [id number]    Patient not seen today secondary to Dialysis. Will follow-up 9/7/24.

## 2024-09-06 NOTE — DISCHARGE SUMMARY
[facility] Medicine  Discharge Summary      Patient Name: Tyra Isaac  MRN: [id number]  [facility]: [id number]  Patient Class: IP- Inpatient  Admission Date: 9/2/2024  Hospital Length of Stay: 2 days  Discharge Date and Time:  09/06/2024 3:00 PM  Attending Physician: Anai Angela MD   Discharging Provider: KELBY Le  Primary Care Provider: Kalpesh Goel MD    Primary Care Team: Networked reference to record PCT     HPI:   The patient is an 85-year-old female with a past medical history of end-stage renal disease, hypertension, gastroparesis, atrial fibrillation, previous CVA, hyperlipidemia, who presented to emergency department for evaluation of gradually worsening generalized weakness.  Patient was recently admitted to the hospital in August and was discharged on August 23, 2024 after an admission for confusion weakness and tremors.  Patient was evaluated at that time and she had recently started on Reglan may which was attributed to causing her facial movements and tremors.  Patient continued to improve with rehab efforts and was discharged home.  Patient's son reports that the patient has been doing very well at home recently.  She was participating in PT and OT and eating well up until the past few days.  Patient's son reports that on Friday (today is Monday) that the patient became progressively more weak with a decreased appetite and garbled speech.  Patient was to have dialysis as she is HD Monday Wednesday Friday however she was too sick to go to dialysis today.  Dialysis clinic recommended patient son to bring patient to the ER for evaluation.  Patient is son denies any fever chills nausea vomiting diarrhea shortness of breath cough or any other complaints.    Patient was evaluated in the emergency department.  Patient was initially hypertensive upon arrival with systolic blood pressure in the 200s.  Patient was given clonidine and hydralazine in the emergency  department which improved her blood pressure to systolic 129 on assessment.  Patient's labs consistent with end-stage renal disease.  CT of brain without acute findings, chest x-ray without acute findings.  COVID and influenza screening negative.  TSH mildly elevated at 5.9 which is increased from 3 weeks ago when it was 3.748.  Ammonia negative, troponin mildly elevated at 31.3, this has also improved from 3 weeks ago when it was 177.  Patient will be admitted to the hospital for further evaluation with consultation to neurology and nephrology.    * No surgery found *      Hospital Course:   85-year-old female with a past medical history of ESRD on HD Monday Wednesday Friday, hypertension, gastroparesis, atrial fibrillation, previous CVA, hyperlipidemia who presented to the ER for evaluation of generalized weakness and slurred speech.  Patient was evaluated in the emergency department, where she was initially found to be hypertensive with a systolic blood pressure of 200.  CT without acute findings.  Labs consistent with end-stage renal disease.  Patient was admitted for further observation and evaluation by nephrology and neurology.  She was monitored on telemetry and remained hemodynamically stable.  Patient continued routine dialysis which was not well tolerated at times due to hypotension.  Patient's mental status continues to slowly improve and patient's daughters reported to nursing staff that she is near her baseline.  Neurology recommended MRI of brain.  MRI brain with no acute findings, but diffuse white matter changes, likely reflection of chronic microvascular ischemia. Her mental status continued to wax and wane.  PT/OT recommended moderate intensity therapy.  Attempted to place patient in SNF; however, she was deemed inappropriate for placement.  Plan of care discussed with patient's son, Raffi, who opted to take the patient home with home hospice.  A referral was made to [facility] who will admit  patient today once at home.  Discharge instructions reviewed and questions answered.  She was discharged to home hospice.     Goals of Care Treatment Preferences:  Code Status: Full Code          What is most important right now is to focus on symptom/pain control, quality of life, even if it means sacrificing a little time, improvement in condition but with limits to invasive therapies.  Accordingly, we have decided that the best plan to meet the patient's goals includes continuing with treatment.      SDOH Screening:  The patient was unable to be screened for utility difficulties, food insecurity, transport difficulties, housing insecurity, and interpersonal safety, so no concerns could be identified this admission.     Consults:   Consults (From admission, onward)          Status Ordering Provider     Inpatient consult to Neurology  Once        Provider:  Rafael Wagner MD    Completed JACKI MCCORMICK     Inpatient consult to Nephrology  Once        Provider:  Rose Maynard MD    Completed DILLAN HENDRIX     Consult to Telemedicine - Acute Stroke  Once        Provider:  (Not yet assigned)    Completed DILLAN HENDRIX            No new Assessment & Plan notes have been filed under this hospital service since the last note was generated.  Service: Hospital Medicine    Final Active Diagnoses:    Diagnosis Date Noted POA    PRINCIPAL PROBLEM:  Encephalopathy, metabolic [G93.41] 09/02/2024 Yes    Dementia [F03.90] 09/06/2024 Yes    Hypertensive cardiovascular-renal disease, stage 5 chronic kidney disease or end stage renal disease, with heart failure [I13.2] 09/02/2024 Yes    Hyperkalemia [E87.5] 06/28/2024 Yes    Moderate malnutrition [E44.0] 03/21/2024 Yes    Elevated TSH [R79.89] 01/04/2024 Yes    Atrial fibrillation [I48.91] 12/13/2019 Yes      Problems Resolved During this Admission:    Diagnosis Date Noted Date Resolved POA    Focal neurological deficit [R29.818] 09/02/2024 09/02/2024 Yes    End stage renal  disease on dialysis [N18.6, Z99.2] 06/25/2021 09/02/2024 Not Applicable       Discharged Condition: poor    Disposition: Hospice/Home    Follow Up:   Follow-up Information       [facility].    Specialties: Hospice Services, Palliative Medicine, Hospice and Palliative Medicine, Hospice Services  Why: Staff will meet with patient at home this afternoon.  Contact information:  [address] [place] [address] 2  [facility] [address]  [phone]                           Patient Instructions:      Ambulatory referral/consult to Outpatient Case Management   Referral Priority: Routine Referral Type: Consultation   Referral Reason: Specialty Services Required   Number of Visits Requested: 1     Ambulatory referral/consult to Hospice   Standing Status: Future   Referral Priority: Routine Referral Type: Consultation   Referral Reason: Specialty Services Required   Requested Specialty: Hospice and Palliative Medicine   Number of Visits Requested: 1     Diet renal     Notify your health care provider if you experience any of the following:  temperature >100.4     Notify your health care provider if you experience any of the following:  persistent nausea and vomiting or diarrhea     Notify your health care provider if you experience any of the following:  severe uncontrolled pain     Notify your health care provider if you experience any of the following:  difficulty breathing or increased cough     Notify your health care provider if you experience any of the following:  severe persistent headache     Notify your health care provider if you experience any of the following:  persistent dizziness, light-headedness, or visual disturbances     Notify your health care provider if you experience any of the following:  increased confusion or weakness     Activity as tolerated       Significant Diagnostic Studies: Labs: All labs within the past 24 hours have been reviewed    Pending Diagnostic Studies:       Procedure Component  Value Units Date/Time    Hepatitis B surface antibody [[id number]]     Order Status: Sent Lab Status: No result     Specimen: Blood     Hepatitis B surface antigen [[id number]]     Order Status: Sent Lab Status: No result     Specimen: Blood            Medications:  Reconciled Home Medications:      Medication List        CHANGE how you take these medications      amiodarone 100 MG Tab  Commonly known as: PACERONE  Take 1 tablet (100 mg total) by mouth once daily.  What changed: when to take this            CONTINUE taking these medications      atorvastatin 40 MG tablet  Commonly known as: LIPITOR  Take 40 mg by mouth every evening.     b complex vitamins tablet  Take 1 tablet by mouth every evening.     calcium acetate(phosphat bind) 667 mg capsule  Commonly known as: PHOSLO  Take 1,334 mg by mouth 3 (three) times daily with meals. TAKE 2 CAPSULES BY MOUTH WITH MEALS AND 1 CAPSULE WITH EACH SNACK     ELIQUIS 2.5 mg Tab  Generic drug: apixaban  Take 2.5 mg by mouth 2 (two) times daily.     latanoprost 0.005 % ophthalmic solution  Place 1 drop into both eyes every evening.     midodrine 10 MG tablet  Commonly known as: PROAMATINE  Take 1 tablet (10 mg total) by mouth 3 (three) times daily with meals.     ondansetron 4 MG Tbdl  Commonly known as: ZOFRAN-ODT  Take 1 tablet (4 mg total) by mouth every 6 (six) hours as needed (nausea).              Indwelling Lines/Drains at time of discharge:   Lines/Drains/Airways       Drain  Duration                  Hemodialysis AV Fistula Right upper arm -- days                    Time spent on the discharge of patient: 33 minutes         KELBY Le  Department of Hospital Medicine  [facility]

## 2024-09-06 NOTE — NURSING
Consent and hep b status verified, removed 500 uf net, no issues with access, post thrill and bruit noted, patient stable throughout treatment, goal not met due to low BP, educated patient on infection control. Report given to Ric, floor nurse     09/06/24 1305   Handoff Report   Received From Mary Carmen   Given To Ric   Vital Signs   Temp 98 °F (36.7 °C)   Temp Source Oral   Pulse 70   Heart Rate Source Monitor   Resp 16   SpO2 98 %   Pulse Oximetry Type Intermittent   Probe Placed On (Pulse Ox) Right:;finger   Device (Oxygen Therapy) room air   BP (!) 132/59   BP Location Left arm   BP Method Automatic   Patient Position Lying   Assessments (Pre/Post)   Consent Obtained yes   Safety vein preservation armband present   Date Hepatitis Profile Obtained 11/10/24   Blood Liters Processed (BLP) 61.5   Transport Modality bed   Level of Consciousness (AVPU) responds to voice   Dialyzer Clearance mildly streaked   Pain   Preferred Pain Scale number (Numeric Rating Pain Scale)   Pain Rating (0-10): Rest 0   Pain Rating (0-10): Activity 0   Pain Management Interventions quiet environment facilitated;position adjusted;pillow support provided   Pain/Comfort Interventions   Fever Reduction/Comfort Measures lightweight clothing;lightweight bedding   Pre-Hemodialysis Assessment   Additional Dialysis Information Needed Yes   Patient Status Departed   Treatment Consent Verified Yes   Treatment Status Completed        Hemodialysis AV Fistula Right upper arm   No Placement Date or Time found.   Present Prior to Hospital Arrival?: Yes  Location: Right upper arm   Site Assessment Clean;Dry;Intact   Patency Present;Thrill;Bruit   Status Deaccessed   Dressing Intervention First dressing   Dressing Status Clean;Dry;Intact   Site Condition No complications   Dressing Gauze   Post-Hemodialysis Assessment   Rinseback Volume (mL) 250 mL   Blood Volume Processed (Liters) 61.5 L   Dialyzer Clearance Lightly streaked   Duration of Treatment 180  minutes   Additional Fluid Intake (mL) 500 mL   Total UF (mL) 1000 mL   Net Fluid Removal 500   Patient Response to Treatment [email or web address]   Post-Treatment Weight 44 kg (97 lb)   Treatment Weight Change -0.5   Arterial bleeding stop time (min) 6 min   Venous bleeding stop time (min) 6 min   Post-Hemodialysis Comments stable

## 2024-09-06 NOTE — PROGRESS NOTES
INPATIENT NEPHROLOGY Progress  [facility]    Patient Name: Tyra Isaac  Date: 09/06/2024    Reason for consultation:   ESRD    Chief Complaint:   Chief Complaint   Patient presents with    Weakness    Slurred speech     Son states she was talking fine last night and this morning she woke up with slurred speech and even weaker than she was yesterday. She was suppose to be at dialysis today but they told her she was too weak to come     History of Present Illness:  84-year-old female presented emergency department with gradually worsening generalized weakness. Patient was feeling weak yesterday and had slurred speech and has difficulty walking over the past 2-3 days per son. Patient on Eliquis and has history of arrhythmias. Patient has difficulty answering questions and patient does have generalized weakness and has some difficulty following commands. Patient however is understanding some questions and following some commands. Patient had previous history of being encephalopathic. Son describes this as more gradual onset of symptoms and patient was gradually getting worse. Patient has history of end-stage renal disease and is scheduled to get dialysis this morning. As patient was extremely weak and they called dialysis and was told to come here. Patient has difficulty walking on her own and needed assistance. Patient is able to move all extremities however has weakness in all extremities and has difficulty walking secondary to generalized weakness. Patient per son was gradually deteriorating. Patient was doing physical therapy and responding well last week however yesterday all day yesterday patient was feeling shaky and had slurring of speech and was having weakness. Consulted for dialysis.     Interval History:  9/2- admit /90, on RA, CT head, neg, CXR clear, BNP 1226 (3400 in Aug), ordered routine HD/UF, she was given clonidine and IV hydralazine and BP improved- seen in ER-  reports not being able to stand/walk- continues with same twitching noted during prior admissions along with same speech changes noted during prior admissions  9/3 VSS. HD today.  9/4 VSS. Hyperkalemia noted, suspect spurious. HD today. Give Lokelma.  9/5 VSS. Labs pending. HD tomorrow or PRN.  9/6  VSS, lab results reviewed.  Sleeping, NAD noted.  HD due today    Plan of Care:    Hyponatremia  Hyperkalemia  ESRD on HD MWF  Hypertensive urgency  Secondary HPT  Anemia of CKD    Plan:    - started Lokelma, adjust dialysate as needed  - continue HD MWF or PRN  - on midodrine PRN for orthostatic hypotension- 1-3L UF as tolerated  - resumed Ca containing binder with meals  - no acute BRAYAN needs, goal Hgb 8-10  - limit free water intake as tolerated    Thank you for allowing us to participate in this patient's care. We will continue to follow.    Vital Signs:  Temp Readings from Last 3 Encounters:   09/06/24 97.4 °F (36.3 °C) (Oral)   08/17/24 98.8 °F (37.1 °C) (Oral)   08/09/24 98.2 °F (36.8 °C) (Oral)       Pulse Readings from Last 3 Encounters:   09/06/24 76   08/20/24 63   08/17/24 64       BP Readings from Last 3 Encounters:   09/06/24 (!) 166/74   08/20/24 (!) 131/53   08/17/24 (!) 145/62       Weight:  Wt Readings from Last 3 Encounters:   09/02/24 44.5 kg (98 lb)   08/13/24 47.6 kg (104 lb 15 oz)   08/09/24 46.3 kg (102 lb)       Past Medical & Surgical History:  Past Medical History:   Diagnosis Date    A-fib     Anxiety     Depression     Disorder of kidney and ureter     Encephalopathy acute 1/1/2018    End stage kidney disease 6/17/2017    Gout     Hyperlipidemia     Hypertension     Moderate episode of recurrent major depressive disorder 1/17/2018    Nephropathy hypertensive, 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    Osteopenia of multiple sites 3/9/2018    Based upon bone density  measurements. Patient also has chronic kidney disease.    Stroke 11/2016       Past Surgical History:   Procedure Laterality Date    ANGIOGRAM, CORONARY, WITH LEFT HEART CATHETERIZATION N/A 2/7/2022    Procedure: Angiogram, Coronary, with Left Heart Cath;  Surgeon: Gino Leal MD;  Location: [facility] CATH/EP LAB;  Service: Cardiology;  Laterality: N/A;    CARDIAC SURGERY      stents    EYE SURGERY      WRIST SURGERY         Past Social History:  Social History     Socioeconomic History    Marital status:      Spouse name: Aria Isaac    Number of children: 3   Occupational History    Occupation: Not working   Tobacco Use    Smoking status: Never    Smokeless tobacco: Never   Substance and Sexual Activity    Alcohol use: No    Drug use: No    Sexual activity: Not Currently     Social Determinants of Health     Financial Resource Strain: Patient Unable To Answer (9/3/2024)    Overall Financial Resource Strain (CARDIA)     Difficulty of Paying Living Expenses: Patient unable to answer   Recent Concern: Financial Resource Strain - Medium Risk (7/11/2024)    Overall Financial Resource Strain (CARDIA)     Difficulty of Paying Living Expenses: Somewhat hard   Food Insecurity: Patient Unable To Answer (9/3/2024)    Hunger Vital Sign     Worried About Running Out of Food in the Last Year: Patient unable to answer     Ran Out of Food in the Last Year: Patient unable to answer   Transportation Needs: Patient Unable To Answer (9/3/2024)    TRANSPORTATION NEEDS     Transportation : Patient unable to answer   Physical Activity: Inactive (9/3/2024)    Exercise Vital Sign     Days of Exercise per Week: 0 days     Minutes of Exercise per Session: 0 min   Stress: Patient Unable To Answer (9/3/2024)    [employer] [place] [employer] - Occupational Stress Questionnaire     Feeling of Stress : Patient unable to answer   Recent Concern: Stress - Stress Concern Present (8/20/2024)    [employer] [place] [employer] - Occupational Stress Questionnaire     Feeling of Stress : Rather much   Housing Stability: Patient Unable To Answer (9/3/2024)    Housing Stability Vital Sign     Unable to Pay for Housing in the Last Year: Patient unable to answer     Homeless in the Last Year: Patient unable to answer       Medications:  Scheduled Meds:   amiodarone  100 mg Oral Daily    apixaban  2.5 mg Oral BID    atorvastatin  40 mg Oral QHS    calcium acetate(phosphat bind)  1,334 mg Oral TID WM    latanoprost  1 drop Both Eyes QHS    levothyroxine  75 mcg Oral Before breakfast    mupirocin   Nasal BID    sodium zirconium cyclosilicate  10 g Oral Daily     Continuous Infusions:      PRN Meds:.  Current Facility-Administered Medications:     acetaminophen, 650 mg, Oral, Q8H PRN    acetaminophen, 650 mg, Oral, Q4H PRN    aluminum-magnesium hydroxide-simethicone, 30 mL, Oral, QID PRN    dextrose 50%, 12.5 g, Intravenous, PRN    dextrose 50%, 25 g, Intravenous, PRN    glucagon (human recombinant), 1 mg, Intramuscular, PRN    glucose, 16 g, Oral, PRN    glucose, 24 g, Oral, PRN    hydrALAZINE, 10 mg, Intravenous, Q6H PRN    HYDROcodone-acetaminophen, 1 tablet, Oral, Q6H PRN    melatonin, 6 mg, Oral, Nightly PRN    naloxone, 0.02 mg, Intravenous, PRN    ondansetron, 4 mg, Intravenous, Q6H PRN    senna-docusate 8.6-50 mg, 1 tablet, Oral, BID PRN    sodium chloride 0.9%, 10 mL, Intravenous, Q12H PRN  No current facility-administered medications on file prior to encounter.     Current Outpatient Medications on File Prior to Encounter   Medication Sig Dispense Refill    amiodarone (PACERONE) 100 MG Tab Take 1 tablet (100 mg total) by mouth once daily. (Patient taking differently: Take 100 mg by mouth every evening.) 30 tablet 0    atorvastatin (LIPITOR) 40 MG tablet Take 40 mg by mouth every evening.      b complex vitamins tablet Take 1 tablet by mouth every evening.      calcium acetate,phosphat bind, (PHOSLO) 667 mg capsule Take  1,334 mg by mouth 3 (three) times daily with meals. TAKE 2 CAPSULES BY MOUTH WITH MEALS AND 1 CAPSULE WITH EACH SNACK      ELIQUIS 2.5 mg Tab Take 2.5 mg by mouth 2 (two) times daily.      latanoprost 0.005 % ophthalmic solution Place 1 drop into both eyes every evening.      midodrine (PROAMATINE) 10 MG tablet Take 1 tablet (10 mg total) by mouth 3 (three) times daily with meals. 270 tablet 0    ondansetron (ZOFRAN-ODT) 4 MG TbDL Take 1 tablet (4 mg total) by mouth every 6 (six) hours as needed (nausea). 30 tablet 5       Allergies:  Cyclobenzaprine, Fish containing products, Peanut, and Tramadol    Past Family History:  Reviewed; refer to Hospitalist Admission Note    Review of Systems:  Review of Systems - All 14 systems reviewed and negative, except as noted in HPI    Physical Exam:  General Appearance:    NAD, AAO x 3, cooperative, appears stated age   Head:    Normocephalic, atraumatic   Eyes:    PER, EOMI, and conjunctiva/sclera clear bilaterally       Mouth:   Moist mucus membranes, no thrush or oral lesions,       normal dentition   Back:     No CVA tenderness   Lungs:     Clear to auscultation bilaterally, no wheezes, crackles,           rales or rhonchi, symmetric air movement, respirations unlabored   Chest wall:    No tenderness or deformity   Heart:    Regular rate and rhythm, S1 and S2 normal, no murmur, rub   or gallop   Abdomen:     Soft, non-tender, non-distended, bowel sounds active all four   quadrants, no RT or guarding, no masses, no organomegaly   Extremities:   Warm and well perfused, distal pulses are intact, no             cyanosis or peripheral edema   MSK:   No joint or muscle swelling, tenderness or deformity   Skin:   Skin color, texture, turgor normal, no rashes or lesions   Neurologic/Psychiatric:   CNII-XII intact, normal strength and sensation       throughout, no asterixis; normal affect, memory, judgement     and insight      Results:  Recent Labs   Lab 09/04/24  0750  "09/05/24  1714 09/06/24  0545   * 137 138   K 6.2* 4.8 5.0   CL 99 101 102   CO2 21* 28 23   BUN 49* 42* 49*   CREATININE 10.1* 8.3* 9.3*   GLU 75 70 82       Recent Labs   Lab 09/04/24  0735 09/05/24  1714 09/06/24  0545   CALCIUM 10.4 10.1 10.1   ALBUMIN 4.6 4.1 4.1   MG 2.3 2.3 2.3             No results for input(s): "POCTGLUCOSE" in the last 168 hours.    Recent Labs   Lab 01/26/22  1525 06/28/24  0553   Hemoglobin A1C 5.3 5.1       Recent Labs   Lab 09/04/24  0735 09/05/24  1714 09/06/24  0545   WBC 5.09 4.97 5.49   HGB 13.9 12.1 12.6   HCT 47.9 40.2 42.8    171 144*   MCV 88 88 89   MCHC 29.0* 30.1* 29.4*   MONO 14.5  0.7 17.9*  0.9 16.4*  0.9   EOSINOPHIL 12.0* 15.3* 15.7*       Recent Labs   Lab 09/04/24  0735 09/05/24  1714 09/06/24  0545   BILITOT 0.9 0.7 0.8   PROT 8.3 7.4 7.4   ALBUMIN 4.6 4.1 4.1   ALKPHOS 70 67 68   ALT 12 14 13   AST 24 28 28       Recent Labs   Lab 12/04/23  0959 12/20/23  1247 06/03/24  1541 08/12/24  1233   Color, UA Yellow Yellow Yellow Yellow   Appearance, UA Clear Clear Clear Clear   pH, UA 8.0 6.0 8.0 8.0   Specific [place], UA 1.015 1.015 1.010 1.010   Protein, UA 2+ A 2+ A Negative 1+ A   Glucose, UA 1+ A Negative Negative Negative   Ketones, UA Trace A Negative Negative Negative   Urobilinogen, UA Negative Negative Negative Negative   Bilirubin (UA) Negative Negative Negative Negative   Occult Blood UA 2+ A Negative Negative 1+ A   Nitrite, UA Negative Negative Negative Negative   RBC, UA 41 H 0  --  0   WBC, UA 2 3  --  4   Bacteria Negative Rare  --  None   Hyaline Casts, UA 6 A 8 A  --  0       Recent Labs   Lab 11/10/23  1529 12/20/23  1207 07/15/24  0621 09/02/24  0704   POC PH 7.391 7.390 7.345 L  --    POC PCO2 37.2 37.3 58.3 H  --    POC HCO3 22.6 L 22.6 L 31.8 H  --    POC PO2 53 LL 99 25 LL  --    POC SATURATED O2 87 98 39  --    POC BE -2 -2 6 H  --    Sample ARTERIAL ARTERIAL VENOUS VENOUS       Microbiology Results (last 7 days)       Procedure " Component Value Units Date/Time    Blood culture x two cultures. Draw prior to antibiotics. [[id number]] Collected: 09/02/24 0817    Order Status: Completed Specimen: Blood from Peripheral, Antecubital, Left Updated: 09/05/24 1032     Blood Culture, Routine No Growth to date      No Growth to date      No Growth to date      No Growth to date    Narrative:      Aerobic and anaerobic  Collection has been rescheduled by [facility] at 09/02/2024 08:01 Reason:   Unable to collect  Collection has been rescheduled by [facility] at 09/02/2024 08:01 Reason:   Unable to collect    Blood culture x two cultures. Draw prior to antibiotics. [[id number]] Collected: 09/02/24 0816    Order Status: Completed Specimen: Blood from Peripheral, Hand, Left Updated: 09/05/24 1032     Blood Culture, Routine No Growth to date      No Growth to date      No Growth to date      No Growth to date    Narrative:      Aerobic and anaerobic  Collection has been rescheduled by [facility] at 09/02/2024 08:01 Reason:   Unable to collect  Collection has been rescheduled by [facility] at 09/02/2024 08:01 Reason:   Unable to collect          I have spent > minutes providing care for this patient for the above diagnoses. These services have included chart/data/imaging review, evaluation, exam, formulation of plan, , note preparation, and discussions with staff involved in this patient's care.    Arpita Salas NP    [facility]  [address] [place] [address]  JORGE Alanis [id number]  [phone] (p)  [phone] (f)

## 2024-09-06 NOTE — PT/OT/SLP PROGRESS
Occupational Therapy      Patient Name:  Tyra Isaac   MRN:  [id number]    Patient not seen today secondary to Dialysis.     9/6/2024

## 2024-09-06 NOTE — PLAN OF CARE
Problem: Adult Inpatient Plan of Care  Goal: Plan of Care Review  Outcome: Progressing     Problem: Hemodialysis  Goal: Safe, Effective Therapy Delivery  Outcome: Progressing     Problem: Infection  Goal: Absence of Infection Signs and Symptoms  Outcome: Progressing

## 2024-09-06 NOTE — PLAN OF CARE
Pt clear for DC from case management standpoint. Discharging to Home with [facility]. Staff will meet patient at their home later this evening . Patient's son , Raffi Isaac will transport patient home from facility .        09/06/24 1245   Final Note   Assessment Type Final Discharge Note   Anticipated Discharge Disposition [facility]

## 2024-09-06 NOTE — PLAN OF CARE
Gayathri ([facility]) reports speaking with son Raffi & he agrees to care from their agency - they will plan home visit later to today to complete admission process

## 2024-09-06 NOTE — PLAN OF CARE
"Lengthy conversation with son Raffi regarding patient's condition - updated patient too weak to attend HD today - discussed possibility of changing dispo from SNF to hospice care - son has previous experience with hospice care & replied "I know this is the end for mom - I was hoping for a few days of SNF to allow family to be able to visit but think it's time for hospice care" - son voiced preference for home hospice - Team updated - awaiting orders    I certify I provided patient choice and a list to the family of CMS rated Hospice agencies.     Preference: First available     Referral forwarded to [facility]       "

## 2024-09-06 NOTE — PLAN OF CARE
Problem: Malnutrition  Goal: Improved Nutritional Intake  Outcome: Progressing  Intervention: Promote and Optimize Oral Intake  Flowsheets (Taken 9/6/2024 1514)  Nutrition Interventions: supplemental drinks provided; patient in HD at visit. Follow up on non-dialysis day

## 2024-09-07 ENCOUNTER — DOCUMENT SCAN (OUTPATIENT)
Dept: HOME HEALTH SERVICES | Facility: HOSPITAL | Age: 84
End: 2024-09-07
Payer: MEDICARE

## 2024-09-07 LAB
BACTERIA BLD CULT: NORMAL
BACTERIA BLD CULT: NORMAL

## 2024-09-26 ENCOUNTER — DOCUMENT SCAN (OUTPATIENT)
Dept: HOME HEALTH SERVICES | Facility: HOSPITAL | Age: 84
End: 2024-09-26
Payer: MEDICARE

## 2024-09-27 ENCOUNTER — DOCUMENT SCAN (OUTPATIENT)
Dept: HOME HEALTH SERVICES | Facility: HOSPITAL | Age: 84
End: 2024-09-27
Payer: MEDICARE

## 2024-09-27 ENCOUNTER — TELEPHONE (OUTPATIENT)
Dept: FAMILY MEDICINE | Facility: CLINIC | Age: 84
End: 2024-09-27
Payer: MEDICARE

## 2024-09-27 NOTE — NURSING
Nurses Note -- 4 Eyes      7/4/2024   6:04 PM      Skin assessed during: Transfer      [] No Altered Skin Integrity Present    []Prevention Measures Documented      [x] Yes- Altered Skin Integrity Present or Discovered   [] LDA Added if Not in Epic (Describe Wound)   [] New Altered Skin Integrity was Present on Admit and Documented in LDA   [x] Wound Image Taken    Wound Care Consulted? No    Attending Nurse:  Danny Henao RN/Staff Member:  AMY Dewitt            Please notify the patient of normal results.  Follow-up as planned.

## 2024-09-27 NOTE — TELEPHONE ENCOUNTER
September 27, 2024    Raffi Isaac      &  Family of Ms.Irverene AL Isaac  [address]  [place] [address]             [facility] [address]  [address]    [place] [address]  Phone: [phone]  Fax: [phone] To the family of Ms. Isaac:-    Please accept our deepest condolences in regards to the recent death of your family member, Ms. Isaac. She was a most remarkable person, and it was always a pleasure to see her. I hope that we were able to provide her with some relief during the time that she was under our care.     As I had known her for several years, she showed a great poise and  resilience as she dealt with her health issues and adversities.    I will keep her in my prayers.    On behalf of myself and my staff, please also extend our condolences to all of your family. If there is anything else that we can do for you, please do not hesitate to contact us.    Sincerely,        Kalpesh Goel MD

## 2024-09-27 NOTE — LETTER
September 27, 2024    Raffi Isaac      &  Family of Ms.Irverene AL Isaac  [address]  [place] [address]             [facility] [address]  [address]    [place] [address]  Phone: [phone]  Fax: [phone] To the family of Ms. Isaac:-    Please accept our deepest condolences in regards to the recent death of your family member, Ms. Isaac. She was a most remarkable person, and it was always a pleasure to see her. I hope that we were able to provide her with some relief during the time that she was under our care.     As I had known her for several years, she showed a great poise and  resilience as she dealt with her health issues and adversities.    I will keep her in my prayers.    On behalf of myself and my staff, please also extend our condolences to all of your family. If there is anything else that we can do for you, please do not hesitate to contact us.    Sincerely,        Kalpesh Goel MD

## 2024-10-01 ENCOUNTER — DOCUMENT SCAN (OUTPATIENT)
Dept: HOME HEALTH SERVICES | Facility: HOSPITAL | Age: 84
End: 2024-10-01
Payer: MEDICARE
